# Patient Record
Sex: FEMALE | Race: WHITE | NOT HISPANIC OR LATINO | Employment: OTHER | URBAN - METROPOLITAN AREA
[De-identification: names, ages, dates, MRNs, and addresses within clinical notes are randomized per-mention and may not be internally consistent; named-entity substitution may affect disease eponyms.]

---

## 2017-03-27 ENCOUNTER — OFFICE VISIT (OUTPATIENT)
Dept: FAMILY MEDICINE CLINIC | Facility: CLINIC | Age: 57
End: 2017-03-27

## 2017-03-27 VITALS
SYSTOLIC BLOOD PRESSURE: 130 MMHG | OXYGEN SATURATION: 97 % | DIASTOLIC BLOOD PRESSURE: 70 MMHG | WEIGHT: 293 LBS | BODY MASS INDEX: 45.99 KG/M2 | HEART RATE: 78 BPM | HEIGHT: 67 IN | RESPIRATION RATE: 20 BRPM | TEMPERATURE: 98 F

## 2017-03-27 DIAGNOSIS — I10 ESSENTIAL HYPERTENSION: ICD-10-CM

## 2017-03-27 DIAGNOSIS — E03.9 ACQUIRED HYPOTHYROIDISM: ICD-10-CM

## 2017-03-27 DIAGNOSIS — Z79.4 CONTROLLED TYPE 2 DIABETES MELLITUS WITHOUT COMPLICATION, WITH LONG-TERM CURRENT USE OF INSULIN (HCC): ICD-10-CM

## 2017-03-27 DIAGNOSIS — F32.A DEPRESSION, UNSPECIFIED DEPRESSION TYPE: ICD-10-CM

## 2017-03-27 DIAGNOSIS — M15.3 POST-TRAUMATIC OSTEOARTHRITIS OF MULTIPLE JOINTS: ICD-10-CM

## 2017-03-27 DIAGNOSIS — E11.9 CONTROLLED TYPE 2 DIABETES MELLITUS WITHOUT COMPLICATION, WITH LONG-TERM CURRENT USE OF INSULIN (HCC): ICD-10-CM

## 2017-03-27 DIAGNOSIS — E78.2 MIXED HYPERLIPIDEMIA: ICD-10-CM

## 2017-03-27 PROCEDURE — 99214 OFFICE O/P EST MOD 30 MIN: CPT | Performed by: NURSE PRACTITIONER

## 2017-03-27 RX ORDER — ATORVASTATIN CALCIUM 40 MG/1
40 TABLET, FILM COATED ORAL DAILY
Qty: 30 TABLET | Refills: 5 | Status: SHIPPED | OUTPATIENT
Start: 2017-03-27 | End: 2017-06-19 | Stop reason: SDUPTHER

## 2017-03-27 RX ORDER — INSULIN DETEMIR 100 [IU]/ML
INJECTION, SOLUTION SUBCUTANEOUS
COMMUNITY
Start: 2017-02-18 | End: 2017-03-27 | Stop reason: SDUPTHER

## 2017-03-27 RX ORDER — DULOXETIN HYDROCHLORIDE 60 MG/1
60 CAPSULE, DELAYED RELEASE ORAL DAILY
Qty: 30 CAPSULE | Refills: 5 | Status: SHIPPED | OUTPATIENT
Start: 2017-03-27 | End: 2017-06-19 | Stop reason: SDUPTHER

## 2017-03-27 RX ORDER — REPAGLINIDE 2 MG/1
2 TABLET ORAL
Qty: 90 TABLET | Refills: 5 | Status: SHIPPED | OUTPATIENT
Start: 2017-03-27 | End: 2017-06-19 | Stop reason: SDUPTHER

## 2017-03-27 RX ORDER — INSULIN DETEMIR 100 [IU]/ML
60 INJECTION, SOLUTION SUBCUTANEOUS 2 TIMES DAILY
Qty: 12 PEN | Refills: 5 | Status: SHIPPED | OUTPATIENT
Start: 2017-03-27 | End: 2017-10-16 | Stop reason: SDUPTHER

## 2017-03-27 RX ORDER — POTASSIUM CITRATE 15 MEQ/1
1 TABLET, EXTENDED RELEASE ORAL DAILY
Qty: 30 TABLET | Refills: 5 | Status: SHIPPED | OUTPATIENT
Start: 2017-03-27 | End: 2017-03-28

## 2017-03-27 RX ORDER — NABUMETONE 750 MG/1
750 TABLET, FILM COATED ORAL 2 TIMES DAILY
Qty: 60 TABLET | Refills: 5 | Status: SHIPPED | OUTPATIENT
Start: 2017-03-27 | End: 2017-05-19

## 2017-03-27 RX ORDER — VALSARTAN 320 MG/1
320 TABLET ORAL DAILY
Qty: 30 TABLET | Refills: 5 | Status: SHIPPED | OUTPATIENT
Start: 2017-03-27 | End: 2017-05-19

## 2017-03-27 RX ORDER — LEVOTHYROXINE SODIUM 0.05 MG/1
50 TABLET ORAL DAILY
Qty: 30 TABLET | Refills: 5 | Status: SHIPPED | OUTPATIENT
Start: 2017-03-27 | End: 2017-06-19 | Stop reason: SDUPTHER

## 2017-03-27 NOTE — PROGRESS NOTES
Subjective   Juana Hall is a 56 y.o. female.     History of Present Illness   Chief Complaint:   Chief Complaint   Patient presents with   • Hypertension     MED REFILL   • Hyperlipidemia   • Depression   • Anxiety       Juana Hall 56 y.o. female who presents today for Medical Management of the below listed issues and medication refills.  She was previously seeing Dr. Gan and is in need of new PCP.  she has a history of   Patient Active Problem List   Diagnosis   • Anxiety   • Depression   • Diabetes type 2, controlled   • Hyperlipidemia   • Heart murmur   • Hypertension   • Post-traumatic osteoarthritis of multiple joints   • Psoriasis   • Radiculopathy   • Acquired hypothyroidism   .  Since the last visit, she has overall felt well.  she has been compliant with   Current Outpatient Prescriptions:   •  atorvastatin (LIPITOR) 40 MG tablet, Take 1 tablet by mouth Daily., Disp: 30 tablet, Rfl: 5  •  Dulaglutide 1.5 MG/0.5ML solution pen-injector, Inject 1.5 mg under the skin 1 (One) Time Per Week., Disp: 4 pen, Rfl: 5  •  DULoxetine (CYMBALTA) 60 MG capsule, Take 1 capsule by mouth Daily., Disp: 30 capsule, Rfl: 5  •  glucose blood test strip, 1 each by Other route 3 (Three) Times a Day. Use as instructed, Disp: 100 each, Rfl: 5  •  LEVEMIR FLEXTOUCH 100 UNIT/ML injection, Inject 60 Units under the skin 2 (Two) Times a Day., Disp: 12 pen, Rfl: 5  •  levothyroxine (SYNTHROID, LEVOTHROID) 50 MCG tablet, Take 1 tablet by mouth Daily., Disp: 30 tablet, Rfl: 5  •  nabumetone (RELAFEN) 750 MG tablet, Take 1 tablet by mouth 2 (Two) Times a Day., Disp: 60 tablet, Rfl: 5  •  Potassium Citrate ER 15 MEQ (1620 MG) tablet controlled-release, Take 1 tablet by mouth Daily., Disp: 30 tablet, Rfl: 5  •  repaglinide (PRANDIN) 2 MG tablet, Take 1 tablet by mouth 3 (Three) Times a Day Before Meals., Disp: 90 tablet, Rfl: 5  •  valsartan (DIOVAN) 320 MG tablet, Take 1 tablet by mouth Daily., Disp: 30 tablet, Rfl: 5.  she  "denies medication side effects.    All of the chronic condition(s) listed above are stable w/o issues.  Patient previously saw endo for management of diabetes but wanted PCP to manage as she was stable and specialist copay was expensive.   Patient was also started on oral potassium per urologist to help with kidney stones. Last eye exam was a few months ago and was normal per patient.  She sees podiatry regularly to have her feet checked.     /70  Pulse 78  Temp 98 °F (36.7 °C)  Resp 20  Ht 67\" (170.2 cm)  Wt (!) 392 lb (178 kg)  LMP  (LMP Unknown)  SpO2 97%  BMI 61.4 kg/m2    No results found for this or any previous visit.    The following portions of the patient's history were reviewed and updated as appropriate: allergies, current medications, past family history, past medical history, past social history, past surgical history and problem list.    Review of Systems   Constitutional: Positive for fatigue.   Cardiovascular: Negative for chest pain.   Endocrine: Positive for polyuria. Negative for polydipsia and polyphagia.   Skin: Negative for pallor.   Neurological: Positive for weakness. Negative for dizziness, tremors, seizures, speech difficulty and headaches.   Psychiatric/Behavioral: Negative for confusion. The patient is not nervous/anxious.        Objective   Physical Exam   Constitutional: She is oriented to person, place, and time. She appears well-developed and well-nourished.   Cardiovascular: Normal rate and regular rhythm.    Pulmonary/Chest: Effort normal and breath sounds normal.   Neurological: She is alert and oriented to person, place, and time.   Psychiatric: She has a normal mood and affect. Judgment normal.   Vitals reviewed.      Assessment/Plan   Juana was seen today for hypertension, hyperlipidemia, depression and anxiety.    Diagnoses and all orders for this visit:    Essential hypertension  -     valsartan (DIOVAN) 320 MG tablet; Take 1 tablet by mouth Daily.  -     " Comprehensive metabolic panel  -     Lipid panel  -     CBC and Differential  -     TSH  -     Hemoglobin A1c  -     MicroAlbumin, Urine, Random    Post-traumatic osteoarthritis of multiple joints  -     nabumetone (RELAFEN) 750 MG tablet; Take 1 tablet by mouth 2 (Two) Times a Day.    Acquired hypothyroidism  -     levothyroxine (SYNTHROID, LEVOTHROID) 50 MCG tablet; Take 1 tablet by mouth Daily.  -     Comprehensive metabolic panel  -     Lipid panel  -     CBC and Differential  -     TSH  -     Hemoglobin A1c  -     MicroAlbumin, Urine, Random    Depression, unspecified depression type  -     DULoxetine (CYMBALTA) 60 MG capsule; Take 1 capsule by mouth Daily.    Mixed hyperlipidemia  -     atorvastatin (LIPITOR) 40 MG tablet; Take 1 tablet by mouth Daily.  -     Comprehensive metabolic panel  -     Lipid panel  -     CBC and Differential  -     TSH  -     Hemoglobin A1c  -     MicroAlbumin, Urine, Random    Controlled type 2 diabetes mellitus without complication, with long-term current use of insulin  -     LEVEMIR FLEXTOUCH 100 UNIT/ML injection; Inject 60 Units under the skin 2 (Two) Times a Day.  -     Dulaglutide 1.5 MG/0.5ML solution pen-injector; Inject 1.5 mg under the skin 1 (One) Time Per Week.  -     glucose blood test strip; 1 each by Other route 3 (Three) Times a Day. Use as instructed  -     repaglinide (PRANDIN) 2 MG tablet; Take 1 tablet by mouth 3 (Three) Times a Day Before Meals.  -     Comprehensive metabolic panel  -     Lipid panel  -     CBC and Differential  -     TSH  -     Hemoglobin A1c  -     MicroAlbumin, Urine, Random    Other orders  -     Potassium Citrate ER 15 MEQ (1620 MG) tablet controlled-release; Take 1 tablet by mouth Daily.        Patient understands that she will be referred back to endo if diabetes not well controlled based on lab results.

## 2017-03-28 ENCOUNTER — TELEPHONE (OUTPATIENT)
Dept: FAMILY MEDICINE CLINIC | Facility: CLINIC | Age: 57
End: 2017-03-28

## 2017-03-28 DIAGNOSIS — R79.9 ABNORMAL BLOOD CHEMISTRY: Primary | ICD-10-CM

## 2017-03-28 DIAGNOSIS — N28.9 ABNORMAL KIDNEY FUNCTION: Primary | ICD-10-CM

## 2017-03-28 LAB
ALBUMIN SERPL-MCNC: 3.9 G/DL (ref 3.5–5.2)
ALBUMIN/GLOB SERPL: 1.2 G/DL
ALP SERPL-CCNC: 82 U/L (ref 39–117)
ALT SERPL-CCNC: 21 U/L (ref 1–33)
AST SERPL-CCNC: 18 U/L (ref 1–32)
BASOPHILS # BLD AUTO: 0.03 10*3/MM3 (ref 0–0.2)
BASOPHILS NFR BLD AUTO: 0.4 % (ref 0–1.5)
BILIRUB SERPL-MCNC: 0.4 MG/DL (ref 0.1–1.2)
BUN SERPL-MCNC: 34 MG/DL (ref 6–20)
BUN/CREAT SERPL: 15.9 (ref 7–25)
CALCIUM SERPL-MCNC: 9 MG/DL (ref 8.6–10.5)
CHLORIDE SERPL-SCNC: 106 MMOL/L (ref 98–107)
CHOLEST SERPL-MCNC: 139 MG/DL (ref 0–200)
CO2 SERPL-SCNC: 24.7 MMOL/L (ref 22–29)
CREAT SERPL-MCNC: 2.14 MG/DL (ref 0.57–1)
EOSINOPHIL # BLD AUTO: 0.3 10*3/MM3 (ref 0–0.7)
EOSINOPHIL NFR BLD AUTO: 3.6 % (ref 0.3–6.2)
ERYTHROCYTE [DISTWIDTH] IN BLOOD BY AUTOMATED COUNT: 15.3 % (ref 11.7–13)
GLOBULIN SER CALC-MCNC: 3.3 GM/DL
GLUCOSE SERPL-MCNC: 80 MG/DL (ref 65–99)
HBA1C MFR BLD: 6.4 % (ref 4.8–5.6)
HCT VFR BLD AUTO: 36.5 % (ref 35.6–45.5)
HDLC SERPL-MCNC: 41 MG/DL (ref 40–60)
HGB BLD-MCNC: 11 G/DL (ref 11.9–15.5)
IMM GRANULOCYTES # BLD: 0.03 10*3/MM3 (ref 0–0.03)
IMM GRANULOCYTES NFR BLD: 0.4 % (ref 0–0.5)
LDLC SERPL CALC-MCNC: 77 MG/DL (ref 0–100)
LYMPHOCYTES # BLD AUTO: 1.41 10*3/MM3 (ref 0.9–4.8)
LYMPHOCYTES NFR BLD AUTO: 16.7 % (ref 19.6–45.3)
MCH RBC QN AUTO: 28.5 PG (ref 26.9–32)
MCHC RBC AUTO-ENTMCNC: 30.1 G/DL (ref 32.4–36.3)
MCV RBC AUTO: 94.6 FL (ref 80.5–98.2)
MICROALBUMIN UR-MCNC: 160.3 UG/ML
MONOCYTES # BLD AUTO: 0.58 10*3/MM3 (ref 0.2–1.2)
MONOCYTES NFR BLD AUTO: 6.9 % (ref 5–12)
NEUTROPHILS # BLD AUTO: 6.07 10*3/MM3 (ref 1.9–8.1)
NEUTROPHILS NFR BLD AUTO: 72 % (ref 42.7–76)
PLATELET # BLD AUTO: 269 10*3/MM3 (ref 140–500)
POTASSIUM SERPL-SCNC: 6.4 MMOL/L (ref 3.5–5.2)
PROT SERPL-MCNC: 7.2 G/DL (ref 6–8.5)
RBC # BLD AUTO: 3.86 10*6/MM3 (ref 3.9–5.2)
SODIUM SERPL-SCNC: 144 MMOL/L (ref 136–145)
TRIGL SERPL-MCNC: 106 MG/DL (ref 0–150)
TSH SERPL DL<=0.005 MIU/L-ACNC: 4.39 MIU/ML (ref 0.27–4.2)
VLDLC SERPL CALC-MCNC: 21.2 MG/DL (ref 5–40)
WBC # BLD AUTO: 8.42 10*3/MM3 (ref 4.5–10.7)

## 2017-03-28 NOTE — TELEPHONE ENCOUNTER
Reported from gauri Gupta. They report patient has a potassium of 6.4. Reported to Kiana. N.o received to d/c potassium and repeat bmp in 1 week.

## 2017-04-02 ENCOUNTER — RESULTS ENCOUNTER (OUTPATIENT)
Dept: FAMILY MEDICINE CLINIC | Facility: CLINIC | Age: 57
End: 2017-04-02

## 2017-04-02 DIAGNOSIS — R79.9 ABNORMAL BLOOD CHEMISTRY: ICD-10-CM

## 2017-04-05 LAB
BUN SERPL-MCNC: 29 MG/DL (ref 6–20)
BUN/CREAT SERPL: 14.2 (ref 7–25)
CALCIUM SERPL-MCNC: 9.1 MG/DL (ref 8.6–10.5)
CHLORIDE SERPL-SCNC: 106 MMOL/L (ref 98–107)
CO2 SERPL-SCNC: 25.1 MMOL/L (ref 22–29)
CREAT SERPL-MCNC: 2.04 MG/DL (ref 0.57–1)
GLUCOSE SERPL-MCNC: 229 MG/DL (ref 65–99)
POTASSIUM SERPL-SCNC: 6 MMOL/L (ref 3.5–5.2)
SODIUM SERPL-SCNC: 145 MMOL/L (ref 136–145)

## 2017-04-06 DIAGNOSIS — E87.5 SERUM POTASSIUM ELEVATED: Primary | ICD-10-CM

## 2017-04-11 ENCOUNTER — OFFICE VISIT (OUTPATIENT)
Dept: FAMILY MEDICINE CLINIC | Facility: CLINIC | Age: 57
End: 2017-04-11

## 2017-04-11 ENCOUNTER — RESULTS ENCOUNTER (OUTPATIENT)
Dept: FAMILY MEDICINE CLINIC | Facility: CLINIC | Age: 57
End: 2017-04-11

## 2017-04-11 VITALS
HEIGHT: 67 IN | BODY MASS INDEX: 45.99 KG/M2 | SYSTOLIC BLOOD PRESSURE: 132 MMHG | RESPIRATION RATE: 20 BRPM | WEIGHT: 293 LBS | OXYGEN SATURATION: 97 % | HEART RATE: 86 BPM | DIASTOLIC BLOOD PRESSURE: 78 MMHG | TEMPERATURE: 99 F

## 2017-04-11 DIAGNOSIS — J40 BRONCHITIS: Primary | ICD-10-CM

## 2017-04-11 DIAGNOSIS — E87.5 SERUM POTASSIUM ELEVATED: ICD-10-CM

## 2017-04-11 PROCEDURE — 99213 OFFICE O/P EST LOW 20 MIN: CPT | Performed by: NURSE PRACTITIONER

## 2017-04-11 RX ORDER — PREDNISONE 20 MG/1
20 TABLET ORAL 2 TIMES DAILY
Qty: 10 TABLET | Refills: 0 | Status: SHIPPED | OUTPATIENT
Start: 2017-04-11 | End: 2017-05-19

## 2017-04-11 RX ORDER — CEFUROXIME AXETIL 500 MG/1
500 TABLET ORAL 2 TIMES DAILY
Qty: 14 TABLET | Refills: 0 | Status: SHIPPED | OUTPATIENT
Start: 2017-04-11 | End: 2017-04-18

## 2017-04-11 RX ORDER — BENZONATATE 100 MG/1
200 CAPSULE ORAL 3 TIMES DAILY PRN
Qty: 30 CAPSULE | Refills: 0 | Status: SHIPPED | OUTPATIENT
Start: 2017-04-11 | End: 2017-05-19

## 2017-04-11 NOTE — PROGRESS NOTES
Subjective   Juana Hall is a 56 y.o. female.     History of Present Illness   Juana Hall 56 y.o. female who presents for evaluation of acute bronchitis. Symptoms include dry cough and exertional SOA.  Onset of symptoms was 1 week ago, gradually worsening since that time. Patient denies fever.   Evaluation to date: none Treatment to date:  OTC cough suppressant.     The following portions of the patient's history were reviewed and updated as appropriate: allergies, current medications, past family history, past medical history, past social history, past surgical history and problem list.    Review of Systems   Constitutional: Negative for chills and fever.   HENT: Positive for postnasal drip, rhinorrhea and sore throat. Negative for ear pain.    Respiratory: Positive for cough. Negative for shortness of breath and wheezing.    Cardiovascular: Negative for chest pain.   Musculoskeletal: Negative for myalgias.   Skin: Negative for rash.   Neurological: Negative for headaches.       Objective   Physical Exam   Constitutional: She is oriented to person, place, and time. She appears well-developed and well-nourished.   Cardiovascular: Normal rate and regular rhythm.    Pulmonary/Chest: Effort normal and breath sounds normal.   Neurological: She is alert and oriented to person, place, and time.   Psychiatric: She has a normal mood and affect. Judgment normal.   Vitals reviewed.      Assessment/Plan   Juana was seen today for cough.    Diagnoses and all orders for this visit:    Bronchitis  -     cefuroxime (CEFTIN) 500 MG tablet; Take 1 tablet by mouth 2 (Two) Times a Day for 7 days.  -     predniSONE (DELTASONE) 20 MG tablet; Take 1 tablet by mouth 2 (Two) Times a Day.  -     benzonatate (TESSALON PERLES) 100 MG capsule; Take 2 capsules by mouth 3 (Three) Times a Day As Needed for Cough.

## 2017-04-12 ENCOUNTER — TELEPHONE (OUTPATIENT)
Dept: FAMILY MEDICINE CLINIC | Facility: CLINIC | Age: 57
End: 2017-04-12

## 2017-04-12 LAB
BUN SERPL-MCNC: 41 MG/DL (ref 6–20)
BUN/CREAT SERPL: 24.7 (ref 7–25)
CALCIUM SERPL-MCNC: 9.1 MG/DL (ref 8.6–10.5)
CHLORIDE SERPL-SCNC: 105 MMOL/L (ref 98–107)
CO2 SERPL-SCNC: 20.4 MMOL/L (ref 22–29)
CREAT SERPL-MCNC: 1.66 MG/DL (ref 0.57–1)
GLUCOSE SERPL-MCNC: 102 MG/DL (ref 65–99)
POTASSIUM SERPL-SCNC: 5.6 MMOL/L (ref 3.5–5.2)
SODIUM SERPL-SCNC: 142 MMOL/L (ref 136–145)

## 2017-04-12 NOTE — TELEPHONE ENCOUNTER
----- Message from BETY Washington sent at 4/12/2017 12:07 PM EDT -----  Potassium level and kidney function continuing to improve

## 2017-04-17 ENCOUNTER — TRANSCRIBE ORDERS (OUTPATIENT)
Dept: ADMINISTRATIVE | Facility: HOSPITAL | Age: 57
End: 2017-04-17

## 2017-04-17 DIAGNOSIS — N18.30 CHRONIC KIDNEY DISEASE, STAGE III (MODERATE) (HCC): Primary | ICD-10-CM

## 2017-04-24 ENCOUNTER — APPOINTMENT (OUTPATIENT)
Dept: ULTRASOUND IMAGING | Facility: HOSPITAL | Age: 57
End: 2017-04-24
Attending: INTERNAL MEDICINE

## 2017-05-05 ENCOUNTER — HOSPITAL ENCOUNTER (OUTPATIENT)
Dept: ULTRASOUND IMAGING | Facility: HOSPITAL | Age: 57
Discharge: HOME | End: 2017-05-05
Attending: INTERNAL MEDICINE | Admitting: INTERNAL MEDICINE

## 2017-05-05 DIAGNOSIS — N18.30 CHRONIC KIDNEY DISEASE, STAGE III (MODERATE) (HCC): ICD-10-CM

## 2017-05-05 PROCEDURE — 76775 US EXAM ABDO BACK WALL LIM: CPT

## 2017-05-19 ENCOUNTER — APPOINTMENT (OUTPATIENT)
Dept: PREADMISSION TESTING | Facility: HOSPITAL | Age: 57
End: 2017-05-19

## 2017-05-19 VITALS
DIASTOLIC BLOOD PRESSURE: 52 MMHG | OXYGEN SATURATION: 93 % | WEIGHT: 293 LBS | HEIGHT: 68 IN | RESPIRATION RATE: 18 BRPM | BODY MASS INDEX: 44.41 KG/M2 | TEMPERATURE: 99.1 F | SYSTOLIC BLOOD PRESSURE: 123 MMHG | HEART RATE: 83 BPM

## 2017-05-19 LAB
ANION GAP SERPL CALCULATED.3IONS-SCNC: 14.5 MMOL/L
BASOPHILS # BLD AUTO: 0.03 10*3/MM3 (ref 0–0.2)
BASOPHILS NFR BLD AUTO: 0.3 % (ref 0–1.5)
BUN BLD-MCNC: 27 MG/DL (ref 6–20)
BUN/CREAT SERPL: 13.9 (ref 7–25)
CALCIUM SPEC-SCNC: 8.4 MG/DL (ref 8.6–10.5)
CHLORIDE SERPL-SCNC: 101 MMOL/L (ref 98–107)
CO2 SERPL-SCNC: 25.5 MMOL/L (ref 22–29)
CREAT BLD-MCNC: 1.94 MG/DL (ref 0.57–1)
DEPRECATED RDW RBC AUTO: 45.7 FL (ref 37–54)
EOSINOPHIL # BLD AUTO: 0.28 10*3/MM3 (ref 0–0.7)
EOSINOPHIL NFR BLD AUTO: 2.5 % (ref 0.3–6.2)
ERYTHROCYTE [DISTWIDTH] IN BLOOD BY AUTOMATED COUNT: 14.5 % (ref 11.7–13)
GFR SERPL CREATININE-BSD FRML MDRD: 27 ML/MIN/1.73
GLUCOSE BLD-MCNC: 162 MG/DL (ref 65–99)
HCT VFR BLD AUTO: 34 % (ref 35.6–45.5)
HGB BLD-MCNC: 10.9 G/DL (ref 11.9–15.5)
IMM GRANULOCYTES # BLD: 0.08 10*3/MM3 (ref 0–0.03)
IMM GRANULOCYTES NFR BLD: 0.7 % (ref 0–0.5)
LYMPHOCYTES # BLD AUTO: 2.16 10*3/MM3 (ref 0.9–4.8)
LYMPHOCYTES NFR BLD AUTO: 19 % (ref 19.6–45.3)
MCH RBC QN AUTO: 27.9 PG (ref 26.9–32)
MCHC RBC AUTO-ENTMCNC: 32.1 G/DL (ref 32.4–36.3)
MCV RBC AUTO: 87.2 FL (ref 80.5–98.2)
MONOCYTES # BLD AUTO: 0.7 10*3/MM3 (ref 0.2–1.2)
MONOCYTES NFR BLD AUTO: 6.2 % (ref 5–12)
NEUTROPHILS # BLD AUTO: 8.11 10*3/MM3 (ref 1.9–8.1)
NEUTROPHILS NFR BLD AUTO: 71.3 % (ref 42.7–76)
PLATELET # BLD AUTO: 298 10*3/MM3 (ref 140–500)
PMV BLD AUTO: 9.8 FL (ref 6–12)
POTASSIUM BLD-SCNC: 4.4 MMOL/L (ref 3.5–5.2)
RBC # BLD AUTO: 3.9 10*6/MM3 (ref 3.9–5.2)
SODIUM BLD-SCNC: 141 MMOL/L (ref 136–145)
WBC NRBC COR # BLD: 11.36 10*3/MM3 (ref 4.5–10.7)

## 2017-05-19 PROCEDURE — 93010 ELECTROCARDIOGRAM REPORT: CPT | Performed by: INTERNAL MEDICINE

## 2017-05-19 RX ORDER — AMLODIPINE BESYLATE 10 MG/1
10 TABLET ORAL EVERY MORNING
COMMUNITY
End: 2021-04-01

## 2017-05-22 ENCOUNTER — ANESTHESIA EVENT (OUTPATIENT)
Dept: PERIOP | Facility: HOSPITAL | Age: 57
End: 2017-05-22

## 2017-05-22 ENCOUNTER — HOSPITAL ENCOUNTER (OUTPATIENT)
Facility: HOSPITAL | Age: 57
Setting detail: HOSPITAL OUTPATIENT SURGERY
Discharge: HOME | End: 2017-05-22
Attending: OBSTETRICS & GYNECOLOGY | Admitting: OBSTETRICS & GYNECOLOGY

## 2017-05-22 ENCOUNTER — ANESTHESIA (OUTPATIENT)
Dept: PERIOP | Facility: HOSPITAL | Age: 57
End: 2017-05-22

## 2017-05-22 VITALS
RESPIRATION RATE: 16 BRPM | SYSTOLIC BLOOD PRESSURE: 130 MMHG | TEMPERATURE: 97.6 F | HEART RATE: 65 BPM | OXYGEN SATURATION: 92 % | DIASTOLIC BLOOD PRESSURE: 61 MMHG

## 2017-05-22 DIAGNOSIS — R93.89 THICKENED ENDOMETRIUM: ICD-10-CM

## 2017-05-22 DIAGNOSIS — N95.0 POST-MENOPAUSAL BLEEDING: ICD-10-CM

## 2017-05-22 LAB — GLUCOSE BLDC GLUCOMTR-MCNC: 145 MG/DL (ref 70–130)

## 2017-05-22 PROCEDURE — 25010000002 PROPOFOL 10 MG/ML EMULSION: Performed by: ANESTHESIOLOGY

## 2017-05-22 PROCEDURE — 25010000002 DEXAMETHASONE PER 1 MG: Performed by: ANESTHESIOLOGY

## 2017-05-22 PROCEDURE — 82962 GLUCOSE BLOOD TEST: CPT

## 2017-05-22 PROCEDURE — 25010000002 FENTANYL CITRATE (PF) 100 MCG/2ML SOLUTION: Performed by: ANESTHESIOLOGY

## 2017-05-22 PROCEDURE — 25010000002 ONDANSETRON PER 1 MG: Performed by: ANESTHESIOLOGY

## 2017-05-22 PROCEDURE — 88305 TISSUE EXAM BY PATHOLOGIST: CPT | Performed by: OBSTETRICS & GYNECOLOGY

## 2017-05-22 PROCEDURE — 25010000002 ROPIVACAINE PER 1 MG: Performed by: OBSTETRICS & GYNECOLOGY

## 2017-05-22 PROCEDURE — 25010000002 NEOSTIGMINE 10 MG/10ML SOLUTION: Performed by: ANESTHESIOLOGY

## 2017-05-22 PROCEDURE — 25010000002 MIDAZOLAM PER 1 MG: Performed by: ANESTHESIOLOGY

## 2017-05-22 RX ORDER — SODIUM CHLORIDE, SODIUM LACTATE, POTASSIUM CHLORIDE, CALCIUM CHLORIDE 600; 310; 30; 20 MG/100ML; MG/100ML; MG/100ML; MG/100ML
9 INJECTION, SOLUTION INTRAVENOUS CONTINUOUS
Status: DISCONTINUED | OUTPATIENT
Start: 2017-05-22 | End: 2017-05-22 | Stop reason: HOSPADM

## 2017-05-22 RX ORDER — PROMETHAZINE HYDROCHLORIDE 25 MG/1
25 TABLET ORAL ONCE AS NEEDED
Status: DISCONTINUED | OUTPATIENT
Start: 2017-05-22 | End: 2017-05-22 | Stop reason: HOSPADM

## 2017-05-22 RX ORDER — FENTANYL CITRATE 50 UG/ML
INJECTION, SOLUTION INTRAMUSCULAR; INTRAVENOUS AS NEEDED
Status: DISCONTINUED | OUTPATIENT
Start: 2017-05-22 | End: 2017-05-22 | Stop reason: SURG

## 2017-05-22 RX ORDER — ROPIVACAINE HYDROCHLORIDE 5 MG/ML
INJECTION, SOLUTION EPIDURAL; INFILTRATION; PERINEURAL AS NEEDED
Status: DISCONTINUED | OUTPATIENT
Start: 2017-05-22 | End: 2017-05-22 | Stop reason: HOSPADM

## 2017-05-22 RX ORDER — FAMOTIDINE 10 MG/ML
20 INJECTION, SOLUTION INTRAVENOUS ONCE
Status: COMPLETED | OUTPATIENT
Start: 2017-05-22 | End: 2017-05-22

## 2017-05-22 RX ORDER — CEFAZOLIN SODIUM 2 G/100ML
INJECTION, SOLUTION INTRAVENOUS
Status: DISCONTINUED
Start: 2017-05-22 | End: 2017-05-22 | Stop reason: WASHOUT

## 2017-05-22 RX ORDER — ONDANSETRON 2 MG/ML
4 INJECTION INTRAMUSCULAR; INTRAVENOUS ONCE AS NEEDED
Status: DISCONTINUED | OUTPATIENT
Start: 2017-05-22 | End: 2017-05-22 | Stop reason: HOSPADM

## 2017-05-22 RX ORDER — HYDROCODONE BITARTRATE AND ACETAMINOPHEN 5; 325 MG/1; MG/1
2 TABLET ORAL EVERY 6 HOURS PRN
Status: COMPLETED | OUTPATIENT
Start: 2017-05-22 | End: 2017-05-22

## 2017-05-22 RX ORDER — MORPHINE SULFATE 2 MG/ML
2 INJECTION, SOLUTION INTRAMUSCULAR; INTRAVENOUS
Status: DISCONTINUED | OUTPATIENT
Start: 2017-05-22 | End: 2017-05-22 | Stop reason: HOSPADM

## 2017-05-22 RX ORDER — FENTANYL CITRATE 50 UG/ML
25 INJECTION, SOLUTION INTRAMUSCULAR; INTRAVENOUS
Status: DISCONTINUED | OUTPATIENT
Start: 2017-05-22 | End: 2017-05-22 | Stop reason: HOSPADM

## 2017-05-22 RX ORDER — PROMETHAZINE HYDROCHLORIDE 25 MG/1
12.5 TABLET ORAL ONCE AS NEEDED
Status: DISCONTINUED | OUTPATIENT
Start: 2017-05-22 | End: 2017-05-22 | Stop reason: HOSPADM

## 2017-05-22 RX ORDER — DIPHENHYDRAMINE HYDROCHLORIDE 50 MG/ML
6.25 INJECTION INTRAMUSCULAR; INTRAVENOUS
Status: DISCONTINUED | OUTPATIENT
Start: 2017-05-22 | End: 2017-05-22 | Stop reason: HOSPADM

## 2017-05-22 RX ORDER — PROPOFOL 10 MG/ML
VIAL (ML) INTRAVENOUS AS NEEDED
Status: DISCONTINUED | OUTPATIENT
Start: 2017-05-22 | End: 2017-05-22 | Stop reason: SURG

## 2017-05-22 RX ORDER — MIDAZOLAM HYDROCHLORIDE 1 MG/ML
1 INJECTION INTRAMUSCULAR; INTRAVENOUS
Status: DISCONTINUED | OUTPATIENT
Start: 2017-05-22 | End: 2017-05-22 | Stop reason: HOSPADM

## 2017-05-22 RX ORDER — HYDRALAZINE HYDROCHLORIDE 20 MG/ML
5 INJECTION INTRAMUSCULAR; INTRAVENOUS
Status: DISCONTINUED | OUTPATIENT
Start: 2017-05-22 | End: 2017-05-22 | Stop reason: HOSPADM

## 2017-05-22 RX ORDER — GLYCOPYRROLATE 0.2 MG/ML
INJECTION INTRAMUSCULAR; INTRAVENOUS AS NEEDED
Status: DISCONTINUED | OUTPATIENT
Start: 2017-05-22 | End: 2017-05-22 | Stop reason: SURG

## 2017-05-22 RX ORDER — LABETALOL HYDROCHLORIDE 5 MG/ML
5 INJECTION, SOLUTION INTRAVENOUS
Status: DISCONTINUED | OUTPATIENT
Start: 2017-05-22 | End: 2017-05-22 | Stop reason: HOSPADM

## 2017-05-22 RX ORDER — DEXAMETHASONE SODIUM PHOSPHATE 10 MG/ML
INJECTION INTRAMUSCULAR; INTRAVENOUS AS NEEDED
Status: DISCONTINUED | OUTPATIENT
Start: 2017-05-22 | End: 2017-05-22 | Stop reason: SURG

## 2017-05-22 RX ORDER — NEOSTIGMINE METHYLSULFATE 1 MG/ML
INJECTION, SOLUTION INTRAVENOUS AS NEEDED
Status: DISCONTINUED | OUTPATIENT
Start: 2017-05-22 | End: 2017-05-22 | Stop reason: SURG

## 2017-05-22 RX ORDER — SODIUM CHLORIDE 9 MG/ML
INJECTION, SOLUTION INTRAVENOUS AS NEEDED
Status: DISCONTINUED | OUTPATIENT
Start: 2017-05-22 | End: 2017-05-22 | Stop reason: HOSPADM

## 2017-05-22 RX ORDER — MIDAZOLAM HYDROCHLORIDE 1 MG/ML
2 INJECTION INTRAMUSCULAR; INTRAVENOUS
Status: DISCONTINUED | OUTPATIENT
Start: 2017-05-22 | End: 2017-05-22 | Stop reason: HOSPADM

## 2017-05-22 RX ORDER — SODIUM CHLORIDE 0.9 % (FLUSH) 0.9 %
1-10 SYRINGE (ML) INJECTION AS NEEDED
Status: DISCONTINUED | OUTPATIENT
Start: 2017-05-22 | End: 2017-05-22 | Stop reason: HOSPADM

## 2017-05-22 RX ORDER — MORPHINE SULFATE 2 MG/ML
4 INJECTION, SOLUTION INTRAMUSCULAR; INTRAVENOUS
Status: DISCONTINUED | OUTPATIENT
Start: 2017-05-22 | End: 2017-05-22 | Stop reason: HOSPADM

## 2017-05-22 RX ORDER — HYDROCODONE BITARTRATE AND ACETAMINOPHEN 5; 325 MG/1; MG/1
1-2 TABLET ORAL EVERY 4 HOURS PRN
Qty: 12 TABLET | Refills: 0 | OUTPATIENT
Start: 2017-05-22 | End: 2018-04-27

## 2017-05-22 RX ORDER — PROMETHAZINE HYDROCHLORIDE 25 MG/ML
6.25 INJECTION, SOLUTION INTRAMUSCULAR; INTRAVENOUS ONCE AS NEEDED
Status: DISCONTINUED | OUTPATIENT
Start: 2017-05-22 | End: 2017-05-22 | Stop reason: HOSPADM

## 2017-05-22 RX ORDER — OXYCODONE HYDROCHLORIDE AND ACETAMINOPHEN 5; 325 MG/1; MG/1
1 TABLET ORAL ONCE AS NEEDED
Status: DISCONTINUED | OUTPATIENT
Start: 2017-05-22 | End: 2017-05-22 | Stop reason: HOSPADM

## 2017-05-22 RX ORDER — FLUMAZENIL 0.1 MG/ML
0.2 INJECTION INTRAVENOUS AS NEEDED
Status: DISCONTINUED | OUTPATIENT
Start: 2017-05-22 | End: 2017-05-22 | Stop reason: HOSPADM

## 2017-05-22 RX ORDER — PROMETHAZINE HYDROCHLORIDE 25 MG/ML
12.5 INJECTION, SOLUTION INTRAMUSCULAR; INTRAVENOUS ONCE AS NEEDED
Status: DISCONTINUED | OUTPATIENT
Start: 2017-05-22 | End: 2017-05-22 | Stop reason: HOSPADM

## 2017-05-22 RX ORDER — ONDANSETRON 2 MG/ML
INJECTION INTRAMUSCULAR; INTRAVENOUS AS NEEDED
Status: DISCONTINUED | OUTPATIENT
Start: 2017-05-22 | End: 2017-05-22 | Stop reason: SURG

## 2017-05-22 RX ORDER — ROCURONIUM BROMIDE 10 MG/ML
INJECTION, SOLUTION INTRAVENOUS AS NEEDED
Status: DISCONTINUED | OUTPATIENT
Start: 2017-05-22 | End: 2017-05-22 | Stop reason: SURG

## 2017-05-22 RX ORDER — FENTANYL CITRATE 50 UG/ML
50 INJECTION, SOLUTION INTRAMUSCULAR; INTRAVENOUS
Status: DISCONTINUED | OUTPATIENT
Start: 2017-05-22 | End: 2017-05-22 | Stop reason: HOSPADM

## 2017-05-22 RX ORDER — NALOXONE HCL 0.4 MG/ML
0.4 VIAL (ML) INJECTION AS NEEDED
Status: DISCONTINUED | OUTPATIENT
Start: 2017-05-22 | End: 2017-05-22 | Stop reason: HOSPADM

## 2017-05-22 RX ORDER — PROMETHAZINE HYDROCHLORIDE 25 MG/1
25 SUPPOSITORY RECTAL ONCE AS NEEDED
Status: DISCONTINUED | OUTPATIENT
Start: 2017-05-22 | End: 2017-05-22 | Stop reason: HOSPADM

## 2017-05-22 RX ADMIN — FENTANYL CITRATE 50 MCG: 50 INJECTION INTRAMUSCULAR; INTRAVENOUS at 15:20

## 2017-05-22 RX ADMIN — PROPOFOL 180 MG: 10 INJECTION, EMULSION INTRAVENOUS at 15:10

## 2017-05-22 RX ADMIN — ROCURONIUM BROMIDE 30 MG: 10 INJECTION INTRAVENOUS at 15:10

## 2017-05-22 RX ADMIN — DEXAMETHASONE SODIUM PHOSPHATE 4 MG: 10 INJECTION INTRAMUSCULAR; INTRAVENOUS at 15:11

## 2017-05-22 RX ADMIN — FAMOTIDINE 20 MG: 10 INJECTION, SOLUTION INTRAVENOUS at 13:40

## 2017-05-22 RX ADMIN — NEOSTIGMINE METHYLSULFATE 3 MG: 1 INJECTION INTRAVENOUS at 15:22

## 2017-05-22 RX ADMIN — HYDROCODONE BITARTRATE AND ACETAMINOPHEN 1 TABLET: 5; 325 TABLET ORAL at 16:05

## 2017-05-22 RX ADMIN — MIDAZOLAM 2 MG: 1 INJECTION INTRAMUSCULAR; INTRAVENOUS at 13:40

## 2017-05-22 RX ADMIN — ONDANSETRON 4 MG: 2 INJECTION INTRAMUSCULAR; INTRAVENOUS at 15:22

## 2017-05-22 RX ADMIN — GLYCOPYRROLATE 0.6 MG: 0.2 INJECTION INTRAMUSCULAR; INTRAVENOUS at 15:22

## 2017-05-22 RX ADMIN — SODIUM CHLORIDE, POTASSIUM CHLORIDE, SODIUM LACTATE AND CALCIUM CHLORIDE 9 ML/HR: 600; 310; 30; 20 INJECTION, SOLUTION INTRAVENOUS at 13:39

## 2017-05-22 RX ADMIN — FENTANYL CITRATE 100 MCG: 50 INJECTION INTRAMUSCULAR; INTRAVENOUS at 15:10

## 2017-05-24 LAB
CYTO UR: NORMAL
LAB AP CASE REPORT: NORMAL
Lab: NORMAL
PATH REPORT.FINAL DX SPEC: NORMAL
PATH REPORT.GROSS SPEC: NORMAL

## 2017-06-01 NOTE — OP NOTE
Date of Procedure:  05/22/2017     PREOPERATIVE DIAGNOSES:   1. Postmenopausal bleeding.   2. Thickened endometrial lining.     POSTOPERATIVE DIAGNOSES:  1. Postmenopausal bleeding.   2. Thickened endometrial lining.   3. Atrophic-appearing endometrium.     PROCEDURE: D and C with hysteroscopy    SURGEON: Cherie Oliveros MD     ANESTHESIA: General.     INDICATIONS FOR PROCEDURE: The patient is a 56-year-old postmenopausal patient who had some postmenopausal bleeding. Ultrasound did show a thickened lining that was more than 4 mm and the decision was made to do a dilatation and curettage hysteroscopy.     DESCRIPTION OF PROCEDURE: Under adequate anesthesia the patient was placed in dorsal lithotomy position and prepped and draped in usual sterile fashion. Examination revealed a normal-size uterus. No masses could be palpated. The weighted speculum was placed in the vagina. The anterior lip of the cervix was grasped with a single-tooth tenaculum. The cervix was progressively dilated. The hysteroscope was then inserted and she had a very small cavity with atrophic-appearing endometrium. The sharp curette was then inserted and all areas of the uterine cavity were curetted showing a scant amount of tissue. Hysteroscopy at the end of the procedure again showed no remaining tissue and no lesions were seen. The instruments were removed. Naropin was placed for a paracervical block, 5 mL, at approximately 4 and 8-o’clock positions of the cervix. The patient tolerated the procedure well. Estimated blood loss was minimal.       LOGAN Lopez:ab  D:   05/30/2017 15:32:04  T:   05/30/2017 17:28:06  Job ID:   [id number]  Document ID:   [id number]  cc:

## 2017-06-19 DIAGNOSIS — Z79.4 CONTROLLED TYPE 2 DIABETES MELLITUS WITHOUT COMPLICATION, WITH LONG-TERM CURRENT USE OF INSULIN (HCC): ICD-10-CM

## 2017-06-19 DIAGNOSIS — F32.A DEPRESSION, UNSPECIFIED DEPRESSION TYPE: ICD-10-CM

## 2017-06-19 DIAGNOSIS — E03.9 ACQUIRED HYPOTHYROIDISM: ICD-10-CM

## 2017-06-19 DIAGNOSIS — E11.9 CONTROLLED TYPE 2 DIABETES MELLITUS WITHOUT COMPLICATION, WITH LONG-TERM CURRENT USE OF INSULIN (HCC): ICD-10-CM

## 2017-06-19 DIAGNOSIS — E78.2 MIXED HYPERLIPIDEMIA: ICD-10-CM

## 2017-06-19 RX ORDER — REPAGLINIDE 2 MG/1
2 TABLET ORAL
Qty: 270 TABLET | Refills: 1 | Status: SHIPPED | OUTPATIENT
Start: 2017-06-19 | End: 2017-10-16 | Stop reason: SDUPTHER

## 2017-06-19 RX ORDER — LEVOTHYROXINE SODIUM 0.05 MG/1
50 TABLET ORAL DAILY
Qty: 90 TABLET | Refills: 1 | Status: SHIPPED | OUTPATIENT
Start: 2017-06-19 | End: 2017-10-16 | Stop reason: SDUPTHER

## 2017-06-19 RX ORDER — DULOXETIN HYDROCHLORIDE 60 MG/1
60 CAPSULE, DELAYED RELEASE ORAL DAILY
Qty: 90 CAPSULE | Refills: 1 | Status: SHIPPED | OUTPATIENT
Start: 2017-06-19 | End: 2017-10-16 | Stop reason: SDUPTHER

## 2017-06-19 RX ORDER — ATORVASTATIN CALCIUM 40 MG/1
40 TABLET, FILM COATED ORAL DAILY
Qty: 90 TABLET | Refills: 1 | Status: SHIPPED | OUTPATIENT
Start: 2017-06-19 | End: 2017-10-16 | Stop reason: SDUPTHER

## 2017-10-16 ENCOUNTER — OFFICE VISIT (OUTPATIENT)
Dept: FAMILY MEDICINE CLINIC | Facility: CLINIC | Age: 57
End: 2017-10-16

## 2017-10-16 VITALS
WEIGHT: 293 LBS | SYSTOLIC BLOOD PRESSURE: 136 MMHG | RESPIRATION RATE: 18 BRPM | TEMPERATURE: 98.2 F | OXYGEN SATURATION: 98 % | HEART RATE: 74 BPM | DIASTOLIC BLOOD PRESSURE: 60 MMHG | BODY MASS INDEX: 44.41 KG/M2 | HEIGHT: 68 IN

## 2017-10-16 DIAGNOSIS — Z79.4 CONTROLLED TYPE 2 DIABETES MELLITUS WITHOUT COMPLICATION, WITH LONG-TERM CURRENT USE OF INSULIN (HCC): ICD-10-CM

## 2017-10-16 DIAGNOSIS — E11.9 CONTROLLED TYPE 2 DIABETES MELLITUS WITHOUT COMPLICATION, WITH LONG-TERM CURRENT USE OF INSULIN (HCC): ICD-10-CM

## 2017-10-16 DIAGNOSIS — F32.A DEPRESSION, UNSPECIFIED DEPRESSION TYPE: ICD-10-CM

## 2017-10-16 DIAGNOSIS — E78.2 MIXED HYPERLIPIDEMIA: ICD-10-CM

## 2017-10-16 DIAGNOSIS — R01.1 SYSTOLIC MURMUR: ICD-10-CM

## 2017-10-16 DIAGNOSIS — E03.9 ACQUIRED HYPOTHYROIDISM: ICD-10-CM

## 2017-10-16 DIAGNOSIS — E11.9 TYPE 2 DIABETES MELLITUS WITHOUT COMPLICATION, UNSPECIFIED LONG TERM INSULIN USE STATUS: Primary | ICD-10-CM

## 2017-10-16 PROCEDURE — 99214 OFFICE O/P EST MOD 30 MIN: CPT | Performed by: NURSE PRACTITIONER

## 2017-10-16 PROCEDURE — 90471 IMMUNIZATION ADMIN: CPT | Performed by: NURSE PRACTITIONER

## 2017-10-16 PROCEDURE — 90686 IIV4 VACC NO PRSV 0.5 ML IM: CPT | Performed by: NURSE PRACTITIONER

## 2017-10-16 RX ORDER — INSULIN DETEMIR 100 [IU]/ML
60 INJECTION, SOLUTION SUBCUTANEOUS 2 TIMES DAILY
Qty: 12 PEN | Refills: 5 | Status: SHIPPED | OUTPATIENT
Start: 2017-10-16 | End: 2018-01-20 | Stop reason: SDUPTHER

## 2017-10-16 RX ORDER — DULOXETIN HYDROCHLORIDE 60 MG/1
60 CAPSULE, DELAYED RELEASE ORAL DAILY
Qty: 90 CAPSULE | Refills: 1 | Status: SHIPPED | OUTPATIENT
Start: 2017-10-16 | End: 2018-05-18 | Stop reason: SDUPTHER

## 2017-10-16 RX ORDER — REPAGLINIDE 2 MG/1
2 TABLET ORAL
Qty: 270 TABLET | Refills: 1 | Status: SHIPPED | OUTPATIENT
Start: 2017-10-16 | End: 2018-03-19 | Stop reason: SDUPTHER

## 2017-10-16 RX ORDER — ATORVASTATIN CALCIUM 40 MG/1
40 TABLET, FILM COATED ORAL DAILY
Qty: 90 TABLET | Refills: 1 | Status: SHIPPED | OUTPATIENT
Start: 2017-10-16 | End: 2018-03-19 | Stop reason: SDUPTHER

## 2017-10-16 RX ORDER — LEVOTHYROXINE SODIUM 0.05 MG/1
50 TABLET ORAL DAILY
Qty: 90 TABLET | Refills: 1 | Status: SHIPPED | OUTPATIENT
Start: 2017-10-16 | End: 2018-03-19 | Stop reason: SDUPTHER

## 2017-10-16 NOTE — PROGRESS NOTES
Subjective   Juana Hall is a 57 y.o. female.     History of Present Illness   Chief Complaint:   Chief Complaint   Patient presents with   • Diabetes     med refill   • Hyperlipidemia   • Hypothyroidism       Juana Hall 57 y.o. female who presents today for Medical Management of the below listed issues and medication refills.  she has a problem list of   Patient Active Problem List   Diagnosis   • Anxiety   • Depression   • Diabetes type 2, controlled   • Hyperlipidemia   • Heart murmur   • Hypertension   • Post-traumatic osteoarthritis of multiple joints   • Psoriasis   • Radiculopathy   • Acquired hypothyroidism   .  Since the last visit, she has overall felt well.  she has been compliant with   Current Outpatient Prescriptions:   •  atorvastatin (LIPITOR) 40 MG tablet, Take 1 tablet by mouth Daily., Disp: 90 tablet, Rfl: 1  •  Dulaglutide 1.5 MG/0.5ML solution pen-injector, Inject 1.5 mg under the skin 1 (One) Time Per Week. MONDAYS, Disp: 12 pen, Rfl: 1  •  DULoxetine (CYMBALTA) 60 MG capsule, Take 1 capsule by mouth Daily., Disp: 90 capsule, Rfl: 1  •  glucose blood test strip, 1 each by Other route 3 (Three) Times a Day. Use as instructed, Disp: 100 each, Rfl: 5  •  LEVEMIR FLEXTOUCH 100 UNIT/ML injection, Inject 60 Units under the skin 2 (Two) Times a Day., Disp: 12 pen, Rfl: 5  •  levothyroxine (SYNTHROID, LEVOTHROID) 50 MCG tablet, Take 1 tablet by mouth Daily., Disp: 90 tablet, Rfl: 1  •  repaglinide (PRANDIN) 2 MG tablet, Take 1 tablet by mouth 3 (Three) Times a Day Before Meals., Disp: 270 tablet, Rfl: 1  •  amLODIPine (NORVASC) 10 MG tablet, Take 10 mg by mouth Every Morning., Disp: , Rfl:   •  HYDROcodone-acetaminophen (NORCO) 5-325 MG per tablet, Take 1-2 tablets by mouth Every 4 (Four) Hours As Needed (Pain)., Disp: 12 tablet, Rfl: 0.  she denies medication side effects.    All of the chronic condition(s) listed above are stable w/o issues.    /60  Pulse 74  Temp 98.2 °F (36.8 °C)   "Resp 18  Ht 68\" (172.7 cm)  Wt (!) 398 lb (181 kg)  LMP  (LMP Unknown)  SpO2 98%  BMI 60.52 kg/m2    Results for orders placed or performed during the hospital encounter of 05/22/17   POC Glucose Fingerstick   Result Value Ref Range    Glucose 145 (H) 70 - 130 mg/dL   Tissue Exam   Result Value Ref Range    Case Report       Surgical Pathology Report                         Case: [id number]                                  Authorizing Provider:  Cherie Oliveros MD          Collected:           05/22/2017 03:20 PM          Ordering Location:     [facility]  Received:            05/22/2017 10:10 PM                                 [facility] OR                                                                       Pathologist:           Onofre Ndiaye MD                                                       Specimen:    Uterus, ENDOMETRIAL CURRETTINGS                                                            Final Diagnosis       \"ENDOMETRIAL CURETTINGS\":   PREDOMINANTLY BLOOD WITH FEW TINY FRAGMENTS OF BENIGN INACTIVE ENDOMETRIUM AND     FRAGMENTS OF BENIGN ENDOCERVIX.    THM/darrone     CPT CODES:  1: 40987      Gross Description       1. Received in formalin labeled \"uterus endometrial curettings\" is a 1 x 1 x 0.5 cm aggregate of hemorrhagic tissue fragments.  Specimens are submitted all in block 1A.  CC/USO/CMK/pkm       Microscopic Description       Performed, incorporated in diagnosis.       Embedded Images       Patient has appt with nephrologist Dr. Montes in December. Will get labs prior to appt.    The following portions of the patient's history were reviewed and updated as appropriate: allergies, current medications, past family history, past medical history, past social history, past surgical history and problem list.    Review of Systems    Objective   Physical Exam   Constitutional: She is oriented to person, place, and time. She appears well-developed and well-nourished.   Cardiovascular: " Normal rate and regular rhythm.    Murmur heard.   Systolic murmur is present   Pulmonary/Chest: Effort normal.   Neurological: She is alert and oriented to person, place, and time.   Psychiatric: She has a normal mood and affect. Judgment normal.   Nursing note and vitals reviewed.      Assessment/Plan   Juana was seen today for diabetes, hyperlipidemia and hypothyroidism.    Diagnoses and all orders for this visit:    Type 2 diabetes mellitus without complication, unspecified long term insulin use status  -     Comprehensive metabolic panel  -     Lipid panel  -     CBC and Differential  -     TSH  -     Hemoglobin A1c    Mixed hyperlipidemia  -     atorvastatin (LIPITOR) 40 MG tablet; Take 1 tablet by mouth Daily.    Controlled type 2 diabetes mellitus without complication, with long-term current use of insulin  -     Dulaglutide 1.5 MG/0.5ML solution pen-injector; Inject 1.5 mg under the skin 1 (One) Time Per Week. MONDAYS  -     glucose blood test strip; 1 each by Other route 3 (Three) Times a Day. Use as instructed  -     LEVEMIR FLEXTOUCH 100 UNIT/ML injection; Inject 60 Units under the skin 2 (Two) Times a Day.  -     repaglinide (PRANDIN) 2 MG tablet; Take 1 tablet by mouth 3 (Three) Times a Day Before Meals.    Depression, unspecified depression type  -     DULoxetine (CYMBALTA) 60 MG capsule; Take 1 capsule by mouth Daily.    Acquired hypothyroidism  -     levothyroxine (SYNTHROID, LEVOTHROID) 50 MCG tablet; Take 1 tablet by mouth Daily.    Systolic murmur  -     Adult Transthoracic Echo Complete W/ Cont if Necessary Per Protocol    Other orders  -     Flu Vaccine Quad PF 3YR+ (FLUARIX/FLUZONE [id number])

## 2017-10-23 ENCOUNTER — HOSPITAL ENCOUNTER (OUTPATIENT)
Dept: CARDIOLOGY | Facility: HOSPITAL | Age: 57
Discharge: HOME | End: 2017-10-23
Admitting: NURSE PRACTITIONER

## 2017-10-23 VITALS
HEART RATE: 83 BPM | SYSTOLIC BLOOD PRESSURE: 130 MMHG | HEIGHT: 68 IN | DIASTOLIC BLOOD PRESSURE: 80 MMHG | BODY MASS INDEX: 44.41 KG/M2 | WEIGHT: 293 LBS | OXYGEN SATURATION: 94 %

## 2017-10-23 DIAGNOSIS — R93.1 ABNORMAL ECHOCARDIOGRAM: Primary | ICD-10-CM

## 2017-10-23 LAB
AORTIC DIMENSIONLESS INDEX: 0.6 (DI)
ASCENDING AORTA: 3.2 CM
BH CV ECHO MEAS - ACS: 1.9 CM
BH CV ECHO MEAS - AO MAX PG (FULL): 12.7 MMHG
BH CV ECHO MEAS - AO MAX PG: 18.7 MMHG
BH CV ECHO MEAS - AO MEAN PG (FULL): 6.7 MMHG
BH CV ECHO MEAS - AO MEAN PG: 10.9 MMHG
BH CV ECHO MEAS - AO ROOT AREA (BSA CORRECTED): 1.2
BH CV ECHO MEAS - AO ROOT AREA: 8.1 CM^2
BH CV ECHO MEAS - AO ROOT DIAM: 3.2 CM
BH CV ECHO MEAS - AO V2 MAX: 216.1 CM/SEC
BH CV ECHO MEAS - AO V2 MEAN: 156.9 CM/SEC
BH CV ECHO MEAS - AO V2 VTI: 45.3 CM
BH CV ECHO MEAS - AVA(I,A): 1.9 CM^2
BH CV ECHO MEAS - AVA(I,D): 1.9 CM^2
BH CV ECHO MEAS - AVA(V,A): 1.8 CM^2
BH CV ECHO MEAS - AVA(V,D): 1.8 CM^2
BH CV ECHO MEAS - BSA(HAYCOCK): 3.1 M^2
BH CV ECHO MEAS - BSA: 2.7 M^2
BH CV ECHO MEAS - BZI_BMI: 60.5 KILOGRAMS/M^2
BH CV ECHO MEAS - BZI_METRIC_HEIGHT: 172.7 CM
BH CV ECHO MEAS - BZI_METRIC_WEIGHT: 180.5 KG
BH CV ECHO MEAS - CONTRAST EF (2CH): 52 ML/M^2
BH CV ECHO MEAS - CONTRAST EF 4CH: 46.5 ML/M^2
BH CV ECHO MEAS - EDV(MOD-SP2): 125 ML
BH CV ECHO MEAS - EDV(MOD-SP4): 144 ML
BH CV ECHO MEAS - EDV(TEICH): 118 ML
BH CV ECHO MEAS - EF(CUBED): 52.1 %
BH CV ECHO MEAS - EF(MOD-SP2): 52 %
BH CV ECHO MEAS - EF(MOD-SP4): 49 %
BH CV ECHO MEAS - EF(TEICH): 43.8 %
BH CV ECHO MEAS - ESV(MOD-SP2): 60 ML
BH CV ECHO MEAS - ESV(MOD-SP4): 77 ML
BH CV ECHO MEAS - ESV(TEICH): 66.3 ML
BH CV ECHO MEAS - FS: 21.7 %
BH CV ECHO MEAS - IVS/LVPW: 1
BH CV ECHO MEAS - IVSD: 1.5 CM
BH CV ECHO MEAS - LAT PEAK E' VEL: 12 CM/SEC
BH CV ECHO MEAS - LV DIASTOLIC VOL/BSA (35-75): 52.6 ML/M^2
BH CV ECHO MEAS - LV MASS(C)D: 314.7 GRAMS
BH CV ECHO MEAS - LV MASS(C)DI: 114.9 GRAMS/M^2
BH CV ECHO MEAS - LV MAX PG: 6 MMHG
BH CV ECHO MEAS - LV MEAN PG: 4.2 MMHG
BH CV ECHO MEAS - LV SYSTOLIC VOL/BSA (12-30): 28.1 ML/M^2
BH CV ECHO MEAS - LV V1 MAX: 122.2 CM/SEC
BH CV ECHO MEAS - LV V1 MEAN: 99.4 CM/SEC
BH CV ECHO MEAS - LV V1 VTI: 27.3 CM
BH CV ECHO MEAS - LVIDD: 5 CM
BH CV ECHO MEAS - LVIDS: 3.9 CM
BH CV ECHO MEAS - LVLD AP2: 8.3 CM
BH CV ECHO MEAS - LVLD AP4: 8.4 CM
BH CV ECHO MEAS - LVLS AP2: 7 CM
BH CV ECHO MEAS - LVLS AP4: 7.2 CM
BH CV ECHO MEAS - LVOT AREA (M): 3.1 CM^2
BH CV ECHO MEAS - LVOT AREA: 3.1 CM^2
BH CV ECHO MEAS - LVOT DIAM: 2 CM
BH CV ECHO MEAS - LVPWD: 1.5 CM
BH CV ECHO MEAS - MED PEAK E' VEL: 9 CM/SEC
BH CV ECHO MEAS - MV A DUR: 0.1 SEC
BH CV ECHO MEAS - MV A MAX VEL: 108.7 CM/SEC
BH CV ECHO MEAS - MV DEC SLOPE: 436.1 CM/SEC^2
BH CV ECHO MEAS - MV DEC TIME: 0.2 SEC
BH CV ECHO MEAS - MV E MAX VEL: 87.3 CM/SEC
BH CV ECHO MEAS - MV E/A: 0.8
BH CV ECHO MEAS - MV MAX PG: 5.6 MMHG
BH CV ECHO MEAS - MV MEAN PG: 2.8 MMHG
BH CV ECHO MEAS - MV P1/2T MAX VEL: 100.9 CM/SEC
BH CV ECHO MEAS - MV P1/2T: 67.8 MSEC
BH CV ECHO MEAS - MV V2 MAX: 118.4 CM/SEC
BH CV ECHO MEAS - MV V2 MEAN: 78.1 CM/SEC
BH CV ECHO MEAS - MV V2 VTI: 39.6 CM
BH CV ECHO MEAS - MVA P1/2T LCG: 2.2 CM^2
BH CV ECHO MEAS - MVA(P1/2T): 3.2 CM^2
BH CV ECHO MEAS - MVA(VTI): 2.1 CM^2
BH CV ECHO MEAS - PA ACC TIME: 0.11 SEC
BH CV ECHO MEAS - PA MAX PG (FULL): 4.8 MMHG
BH CV ECHO MEAS - PA MAX PG: 6.8 MMHG
BH CV ECHO MEAS - PA PR(ACCEL): 29.9 MMHG
BH CV ECHO MEAS - PA V2 MAX: 130.3 CM/SEC
BH CV ECHO MEAS - PVA(V,A): 2 CM^2
BH CV ECHO MEAS - PVA(V,D): 2 CM^2
BH CV ECHO MEAS - QP/QS: 0.62
BH CV ECHO MEAS - RAP SYSTOLE: 3 MMHG
BH CV ECHO MEAS - RV MAX PG: 2 MMHG
BH CV ECHO MEAS - RV MEAN PG: 1.1 MMHG
BH CV ECHO MEAS - RV V1 MAX: 71.3 CM/SEC
BH CV ECHO MEAS - RV V1 MEAN: 49.8 CM/SEC
BH CV ECHO MEAS - RV V1 VTI: 14.4 CM
BH CV ECHO MEAS - RVOT AREA: 3.6 CM^2
BH CV ECHO MEAS - RVOT DIAM: 2.1 CM
BH CV ECHO MEAS - SI(AO): 133.7 ML/M^2
BH CV ECHO MEAS - SI(CUBED): 23.7 ML/M^2
BH CV ECHO MEAS - SI(LVOT): 30.9 ML/M^2
BH CV ECHO MEAS - SI(MOD-SP2): 23.7 ML/M^2
BH CV ECHO MEAS - SI(MOD-SP4): 24.5 ML/M^2
BH CV ECHO MEAS - SI(TEICH): 18.9 ML/M^2
BH CV ECHO MEAS - SUP REN AO DIAM: 2 CM
BH CV ECHO MEAS - SV(AO): 366.1 ML
BH CV ECHO MEAS - SV(CUBED): 64.9 ML
BH CV ECHO MEAS - SV(LVOT): 84.5 ML
BH CV ECHO MEAS - SV(MOD-SP2): 65 ML
BH CV ECHO MEAS - SV(MOD-SP4): 67 ML
BH CV ECHO MEAS - SV(RVOT): 52.1 ML
BH CV ECHO MEAS - SV(TEICH): 51.7 ML
BH CV ECHO MEAS - TAPSE (>1.6): 2.5 CM2
BH CV XLRA - RV BASE: 3.2 CM
BH CV XLRA - TDI S': 20 CM/SEC
E/E' RATIO: 8
LEFT ATRIUM VOLUME INDEX: 20 ML/M2
SINUS: 3.1 CM
STJ: 2.7 CM
Z-SCORE OF LEFT VENTRICULAR DIMENSION IN END SYSTOLE: 9

## 2017-10-23 PROCEDURE — 25010000002 PERFLUTREN (DEFINITY) 8.476 MG IN SODIUM CHLORIDE 0.9 % 10 ML INJECTION: Performed by: NURSE PRACTITIONER

## 2017-10-23 PROCEDURE — 93306 TTE W/DOPPLER COMPLETE: CPT

## 2017-10-23 PROCEDURE — 93306 TTE W/DOPPLER COMPLETE: CPT | Performed by: INTERNAL MEDICINE

## 2017-10-23 RX ADMIN — PERFLUTREN 1.5 ML: 6.52 INJECTION, SUSPENSION INTRAVENOUS at 07:48

## 2017-11-18 LAB
ALBUMIN SERPL-MCNC: 3.9 G/DL (ref 3.5–5.5)
ALBUMIN/GLOB SERPL: 1.1 {RATIO} (ref 1.2–2.2)
ALP SERPL-CCNC: 91 IU/L (ref 39–117)
ALT SERPL-CCNC: 13 IU/L (ref 0–32)
AST SERPL-CCNC: 12 IU/L (ref 0–40)
BASOPHILS # BLD AUTO: 0 X10E3/UL (ref 0–0.2)
BASOPHILS NFR BLD AUTO: 0 %
BILIRUB SERPL-MCNC: 0.6 MG/DL (ref 0–1.2)
BUN SERPL-MCNC: 28 MG/DL (ref 6–24)
BUN/CREAT SERPL: 16 (ref 9–23)
CALCIUM SERPL-MCNC: 8.4 MG/DL (ref 8.7–10.2)
CHLORIDE SERPL-SCNC: 100 MMOL/L (ref 96–106)
CHOLEST SERPL-MCNC: 149 MG/DL (ref 100–199)
CO2 SERPL-SCNC: 24 MMOL/L (ref 18–29)
CREAT SERPL-MCNC: 1.75 MG/DL (ref 0.57–1)
EOSINOPHIL # BLD AUTO: 0.4 X10E3/UL (ref 0–0.4)
EOSINOPHIL NFR BLD AUTO: 4 %
ERYTHROCYTE [DISTWIDTH] IN BLOOD BY AUTOMATED COUNT: 15.6 % (ref 12.3–15.4)
GFR SERPLBLD CREATININE-BSD FMLA CKD-EPI: 31 ML/MIN/1.73
GFR SERPLBLD CREATININE-BSD FMLA CKD-EPI: 36 ML/MIN/1.73
GLOBULIN SER CALC-MCNC: 3.4 G/DL (ref 1.5–4.5)
GLUCOSE SERPL-MCNC: 165 MG/DL (ref 65–99)
HBA1C MFR BLD: 6.7 % (ref 4.8–5.6)
HCT VFR BLD AUTO: 36.1 % (ref 34–46.6)
HDLC SERPL-MCNC: 46 MG/DL
HGB BLD-MCNC: 11.7 G/DL (ref 11.1–15.9)
IMM GRANULOCYTES # BLD: 0 X10E3/UL (ref 0–0.1)
IMM GRANULOCYTES NFR BLD: 0 %
LDLC SERPL CALC-MCNC: 78 MG/DL (ref 0–99)
LYMPHOCYTES # BLD AUTO: 1.4 X10E3/UL (ref 0.7–3.1)
LYMPHOCYTES NFR BLD AUTO: 14 %
MCH RBC QN AUTO: 27.3 PG (ref 26.6–33)
MCHC RBC AUTO-ENTMCNC: 32.4 G/DL (ref 31.5–35.7)
MCV RBC AUTO: 84 FL (ref 79–97)
MONOCYTES # BLD AUTO: 0.6 X10E3/UL (ref 0.1–0.9)
MONOCYTES NFR BLD AUTO: 5 %
NEUTROPHILS # BLD AUTO: 7.7 X10E3/UL (ref 1.4–7)
NEUTROPHILS NFR BLD AUTO: 77 %
PLATELET # BLD AUTO: 309 X10E3/UL (ref 150–379)
POTASSIUM SERPL-SCNC: 4.6 MMOL/L (ref 3.5–5.2)
PROT SERPL-MCNC: 7.3 G/DL (ref 6–8.5)
RBC # BLD AUTO: 4.29 X10E6/UL (ref 3.77–5.28)
SODIUM SERPL-SCNC: 141 MMOL/L (ref 134–144)
TRIGL SERPL-MCNC: 126 MG/DL (ref 0–149)
TSH SERPL DL<=0.005 MIU/L-ACNC: 3.32 UIU/ML (ref 0.45–4.5)
VLDLC SERPL CALC-MCNC: 25 MG/DL (ref 5–40)
WBC # BLD AUTO: 10.1 X10E3/UL (ref 3.4–10.8)

## 2017-12-05 ENCOUNTER — OFFICE VISIT (OUTPATIENT)
Dept: CARDIOLOGY | Facility: CLINIC | Age: 57
End: 2017-12-05

## 2017-12-05 VITALS
OXYGEN SATURATION: 98 % | HEART RATE: 78 BPM | WEIGHT: 293 LBS | SYSTOLIC BLOOD PRESSURE: 118 MMHG | DIASTOLIC BLOOD PRESSURE: 78 MMHG | HEIGHT: 55 IN | BODY MASS INDEX: 67.81 KG/M2

## 2017-12-05 DIAGNOSIS — Q23.1 CONGENITAL BICUSPID AORTIC VALVE: Primary | ICD-10-CM

## 2017-12-05 DIAGNOSIS — Z82.49 FAMILY HISTORY OF THORACIC AORTIC ANEURYSM: ICD-10-CM

## 2017-12-05 PROCEDURE — 99204 OFFICE O/P NEW MOD 45 MIN: CPT | Performed by: INTERNAL MEDICINE

## 2017-12-05 RX ORDER — ASPIRIN 81 MG/1
81 TABLET ORAL DAILY
COMMUNITY

## 2017-12-05 RX ORDER — AMOXICILLIN 500 MG/1
2000 CAPSULE ORAL SEE ADMIN INSTRUCTIONS
Qty: 4 CAPSULE | Refills: 6 | Status: SHIPPED | OUTPATIENT
Start: 2017-12-05 | End: 2018-08-28

## 2017-12-10 NOTE — PROGRESS NOTES
Date of Office Visit: 2017  Encounter Provider: Pasha Harkins MD  Place of Service: [facility] CARDIOLOGY  Patient Name: Juana Hall  :1960    Chief complaint: Heart murmur, bicuspid aortic valve    History of Present Illness:    I had the pleasure of seeing the patient in cardiology office on 2017.  She is a   very pleasant 57 year-old white female with a past medical history significant for   prior breast cancer, diabetes, chronic kidney disease, and hypertension who   presents for evaluation.  The patient states that she has had a murmur for quite   some time.  She recently was noted to have a murmur on examination, and   underwent an echocardiogram on 10/23/2017.  This showed a likely bicuspid   aortic valve which was heavily calcified.  She did have mild aortic stenosis with a   peak gradient of 19 mmHg, and a mean gradient of 11 mmHg.  Her ejection fraction   was low normal at 50-55%.  On direct questioning, she has had mild shortness of   breath with exertion for quite some time, although this has not changed significantly.    She has not had any chest pain.  Her father did have a ruptured thoracic aortic   aneurysm.  She also has a significant family history of coronary artery disease with   her mother dying from an MI at 72, and her father dying from an MI at 74.  Of note,   she does have advanced chronic kidney disease with a creatinine of 1.75 and GFR   of 31 on 10/16/2017.    Past Medical History:   Diagnosis Date   • Anxiety and depression    • Bicuspid aortic valve    • Breast cancer     RIGHT, HAD NEELA. MASTECTOMY, CHEMO AND RADIATION   • CKD (chronic kidney disease)    • Diabetes mellitus, type 2    • Foot fracture, right     METATARSAL 3-5, WEARING POST OP SHOE   • Frequent UTI    • Heart murmur    • Hyperlipidemia    • Hypertension    • Hypothyroidism    • Kidney stones    • Lymphedema of leg    • MRSA infection     WITH BREAST  IMPLANT SURGERY   • Osteoarthrosis    • Pap smear, as part of routine gynecological examination     normal   • Post-menopausal bleeding    • Radiculopathy    • Sepsis     FROM KIDNEY STONE   • Thickened endometrium        Past Surgical History:   Procedure Laterality Date   • BREAST RECONSTRUCTION      WITH IMPLANTS, AND REVISION, NOW REMOVED   •  SECTION  1987   • D&C HYSTEROSCOPY N/A 2017    Procedure: DILATATION AND CURETTAGE, HYSTEROSCOPY ;  Surgeon: Cherie Oliveros MD;  Location: [facility] OR [facility] [place];  Service:    • MASTECTOMY Bilateral 2003   • MASTECTOMY Bilateral        Current Outpatient Prescriptions on File Prior to Visit   Medication Sig Dispense Refill   • amLODIPine (NORVASC) 10 MG tablet Take 10 mg by mouth Every Morning.     • atorvastatin (LIPITOR) 40 MG tablet Take 1 tablet by mouth Daily. 90 tablet 1   • Dulaglutide 1.5 MG/0.5ML solution pen-injector Inject 1.5 mg under the skin 1 (One) Time Per Week.  12 pen 1   • DULoxetine (CYMBALTA) 60 MG capsule Take 1 capsule by mouth Daily. 90 capsule 1   • glucose blood test strip 1 each by Other route 3 (Three) Times a Day. Use as instructed 100 each 5   • HYDROcodone-acetaminophen (NORCO) 5-325 MG per tablet Take 1-2 tablets by mouth Every 4 (Four) Hours As Needed (Pain). 12 tablet 0   • LEVEMIR FLEXTOUCH 100 UNIT/ML injection Inject 60 Units under the skin 2 (Two) Times a Day. 12 pen 5   • levothyroxine (SYNTHROID, LEVOTHROID) 50 MCG tablet Take 1 tablet by mouth Daily. 90 tablet 1   • repaglinide (PRANDIN) 2 MG tablet Take 1 tablet by mouth 3 (Three) Times a Day Before Meals. 270 tablet 1     No current facility-administered medications on file prior to visit.      Allergies as of 2017   • (No Known Allergies)     Social History     Social History   • Marital status:      Spouse name: N/A   • Number of children: N/A   • Years of education: N/A     Occupational History   • Not on file.     Social  "History Main Topics   • Smoking status: Never Smoker   • Smokeless tobacco: Never Used   • Alcohol use No   • Drug use: No   • Sexual activity: Defer     Other Topics Concern   • Not on file     Social History Narrative     Family History   Problem Relation Age of Onset   • Coronary artery disease Mother      Mother  from MI at 72   • Diabetes Mother    • Breast cancer Mother    • Aortic aneurysm Father      Father with ruptured thoracic aortic aneurysm   • Coronary artery disease Father      Father  from MI at 74   • Coronary artery disease Maternal Grandmother      MGM deceeased from MI at age 76       Review of Systems   Cardiovascular: Positive for dyspnea on exertion.   Respiratory: Positive for cough.    Musculoskeletal: Positive for joint pain.   Genitourinary: Positive for frequency.   Neurological: Positive for excessive daytime sleepiness and numbness.   All other systems reviewed and are negative.    Objective:     Vitals:    17 1153   BP: 118/78   Pulse: 78   SpO2: 98%   Weight: (!) 180 kg (397 lb)   Height: 68 cm (26.77\")     Body mass index is 389.43 kg/(m^2).    Physical Exam   Constitutional: She is oriented to person, place, and time. She appears well-developed.   Obese   HENT:   Head: Normocephalic and atraumatic.   Eyes: Conjunctivae are normal.   Neck: Neck supple.   Cardiovascular: Normal rate and regular rhythm.  Exam reveals no gallop and no friction rub.    Murmur heard.   Systolic murmur is present with a grade of 3/6  at the upper right sternal border, upper left sternal border  Pulmonary/Chest: Effort normal and breath sounds normal.   Abdominal: Soft. There is no tenderness.   Musculoskeletal: She exhibits no edema.   Neurological: She is alert and oriented to person, place, and time.   Skin: Skin is warm.   Psychiatric: She has a normal mood and affect. Her behavior is normal.     Lab Review:   Procedures    Cardiac Procedures:  1.  Echocardiogram on 10/23/2017: The " ejection fraction was low normal at 50-55%.    There was mild LVH.  There was grade 1 diastolic dysfunction.  The left atrium was   mildly dilated.  A bicuspid aortic valve could not be excluded.  The aortic valve leaflets   were heavily calcified.  There was mild aortic stenosis with a peak gradient of 19   mmHg, and a mean gradient of 11 mmHg.    Assessment:       Diagnosis Plan   1. Congenital bicuspid aortic valve  CT chest wo contrast   2. Family history of thoracic aortic aneurysm  CT chest wo contrast     Plan:       After reviewing the patient's echocardiogram, I strongly suspect that this is a bicuspid   aortic valve.  Based on her young age of 57, this would be the most likely diagnosis.    She does have mild aortic stenosis, although no significant aortic insufficiency.    Interestingly, her father had a ruptured thoracic aortic aneurysm, and aneurysms are   associated with bicuspid valves.  I am going to check a noncontrast CT scan of her   chest to evaluate her aorta in better detail and to assess for any potential aneurysm.    I will not use contrast for this given her pain is chronic kidney disease with a recent   GFR of 31 and creatinine of 1.75.  I would treat her as if she has a bicuspid aortic   valve until proven otherwise.  I have recommended antibiotics prior to any dental   appointments given that this is a congenital abnormality.  I did prescribe amoxicillin   as needed for this.  She will need serial echocardiography in the future, although   she is asymptomatic with regards to this for now.  Further plans will be made   pending the results of the CT scan of the chest.  Otherwise, I will see her back in 6   months unless other issues arise.

## 2017-12-13 ENCOUNTER — HOSPITAL ENCOUNTER (OUTPATIENT)
Dept: CT IMAGING | Facility: HOSPITAL | Age: 57
Discharge: HOME | End: 2017-12-13
Attending: INTERNAL MEDICINE | Admitting: INTERNAL MEDICINE

## 2017-12-13 DIAGNOSIS — Q23.1 CONGENITAL BICUSPID AORTIC VALVE: ICD-10-CM

## 2017-12-13 DIAGNOSIS — Z82.49 FAMILY HISTORY OF THORACIC AORTIC ANEURYSM: ICD-10-CM

## 2017-12-13 PROCEDURE — 71250 CT THORAX DX C-: CPT

## 2018-01-20 DIAGNOSIS — E11.9 CONTROLLED TYPE 2 DIABETES MELLITUS WITHOUT COMPLICATION, WITH LONG-TERM CURRENT USE OF INSULIN (HCC): ICD-10-CM

## 2018-01-20 DIAGNOSIS — Z79.4 CONTROLLED TYPE 2 DIABETES MELLITUS WITHOUT COMPLICATION, WITH LONG-TERM CURRENT USE OF INSULIN (HCC): ICD-10-CM

## 2018-01-22 RX ORDER — INSULIN DETEMIR 100 [IU]/ML
INJECTION, SOLUTION SUBCUTANEOUS
Qty: 9 ML | Refills: 0 | Status: SHIPPED | OUTPATIENT
Start: 2018-01-22 | End: 2018-02-11 | Stop reason: SDUPTHER

## 2018-02-11 DIAGNOSIS — Z79.4 CONTROLLED TYPE 2 DIABETES MELLITUS WITHOUT COMPLICATION, WITH LONG-TERM CURRENT USE OF INSULIN (HCC): ICD-10-CM

## 2018-02-11 DIAGNOSIS — E11.9 CONTROLLED TYPE 2 DIABETES MELLITUS WITHOUT COMPLICATION, WITH LONG-TERM CURRENT USE OF INSULIN (HCC): ICD-10-CM

## 2018-02-12 ENCOUNTER — TELEPHONE (OUTPATIENT)
Dept: FAMILY MEDICINE CLINIC | Facility: CLINIC | Age: 58
End: 2018-02-12

## 2018-02-12 DIAGNOSIS — Z79.4 CONTROLLED TYPE 2 DIABETES MELLITUS WITHOUT COMPLICATION, WITH LONG-TERM CURRENT USE OF INSULIN (HCC): ICD-10-CM

## 2018-02-12 DIAGNOSIS — E11.9 CONTROLLED TYPE 2 DIABETES MELLITUS WITHOUT COMPLICATION, WITH LONG-TERM CURRENT USE OF INSULIN (HCC): ICD-10-CM

## 2018-02-12 RX ORDER — INSULIN DETEMIR 100 [IU]/ML
60 INJECTION, SOLUTION SUBCUTANEOUS 2 TIMES DAILY
Qty: 10 PEN | Refills: 0 | Status: SHIPPED | OUTPATIENT
Start: 2018-02-12 | End: 2018-03-19 | Stop reason: SDUPTHER

## 2018-02-12 RX ORDER — INSULIN DETEMIR 100 [IU]/ML
INJECTION, SOLUTION SUBCUTANEOUS
Qty: 9 PEN | Refills: 0 | Status: SHIPPED | OUTPATIENT
Start: 2018-02-12 | End: 2018-02-12 | Stop reason: SDUPTHER

## 2018-02-21 ENCOUNTER — TELEPHONE (OUTPATIENT)
Dept: CARDIOLOGY | Facility: CLINIC | Age: 58
End: 2018-02-21

## 2018-02-21 RX ORDER — CARVEDILOL 3.12 MG/1
3.12 TABLET ORAL 2 TIMES DAILY
Qty: 60 TABLET | Refills: 11 | Status: SHIPPED | OUTPATIENT
Start: 2018-02-21 | End: 2019-02-10 | Stop reason: SDUPTHER

## 2018-03-07 ENCOUNTER — CLINICAL SUPPORT (OUTPATIENT)
Dept: CARDIOLOGY | Facility: CLINIC | Age: 58
End: 2018-03-07

## 2018-03-07 NOTE — PROGRESS NOTES
Pt came in today with her home blood pressure monitor to get her blood pressure checked. She was started on coreg 3.125mg bid on 2/21/18. Medications were reconciled.  Her vitals are as follows:    Manual Cuff  BP  142/80 R  HR  62    Home Wrist BP Cuff  BP  131/78 R  HR  75    Per Dr. Harkins, he will review and call the patient.

## 2018-03-16 DIAGNOSIS — E11.9 CONTROLLED TYPE 2 DIABETES MELLITUS WITHOUT COMPLICATION, WITH LONG-TERM CURRENT USE OF INSULIN (HCC): ICD-10-CM

## 2018-03-16 DIAGNOSIS — Z79.4 CONTROLLED TYPE 2 DIABETES MELLITUS WITHOUT COMPLICATION, WITH LONG-TERM CURRENT USE OF INSULIN (HCC): ICD-10-CM

## 2018-03-16 RX ORDER — DULAGLUTIDE 1.5 MG/.5ML
INJECTION, SOLUTION SUBCUTANEOUS
Refills: 0 | Status: CANCELLED | OUTPATIENT
Start: 2018-03-16

## 2018-03-16 RX ORDER — INSULIN DETEMIR 100 [IU]/ML
INJECTION, SOLUTION SUBCUTANEOUS
Refills: 0 | Status: CANCELLED | OUTPATIENT
Start: 2018-03-16

## 2018-03-19 ENCOUNTER — OFFICE VISIT (OUTPATIENT)
Dept: FAMILY MEDICINE CLINIC | Facility: CLINIC | Age: 58
End: 2018-03-19

## 2018-03-19 VITALS
OXYGEN SATURATION: 96 % | HEART RATE: 77 BPM | BODY MASS INDEX: 44.41 KG/M2 | TEMPERATURE: 97.8 F | RESPIRATION RATE: 18 BRPM | DIASTOLIC BLOOD PRESSURE: 70 MMHG | HEIGHT: 68 IN | WEIGHT: 293 LBS | SYSTOLIC BLOOD PRESSURE: 132 MMHG

## 2018-03-19 DIAGNOSIS — E78.2 MIXED HYPERLIPIDEMIA: ICD-10-CM

## 2018-03-19 DIAGNOSIS — E11.9 CONTROLLED TYPE 2 DIABETES MELLITUS WITHOUT COMPLICATION, WITH LONG-TERM CURRENT USE OF INSULIN (HCC): ICD-10-CM

## 2018-03-19 DIAGNOSIS — E03.9 ACQUIRED HYPOTHYROIDISM: ICD-10-CM

## 2018-03-19 DIAGNOSIS — M15.3 POST-TRAUMATIC OSTEOARTHRITIS OF MULTIPLE JOINTS: Primary | ICD-10-CM

## 2018-03-19 DIAGNOSIS — Z79.4 CONTROLLED TYPE 2 DIABETES MELLITUS WITHOUT COMPLICATION, WITH LONG-TERM CURRENT USE OF INSULIN (HCC): ICD-10-CM

## 2018-03-19 PROCEDURE — 99214 OFFICE O/P EST MOD 30 MIN: CPT | Performed by: NURSE PRACTITIONER

## 2018-03-19 RX ORDER — ATORVASTATIN CALCIUM 40 MG/1
40 TABLET, FILM COATED ORAL DAILY
Qty: 90 TABLET | Refills: 1 | Status: SHIPPED | OUTPATIENT
Start: 2018-03-19 | End: 2018-08-28 | Stop reason: SDUPTHER

## 2018-03-19 RX ORDER — LEVOTHYROXINE SODIUM 0.05 MG/1
50 TABLET ORAL DAILY
Qty: 90 TABLET | Refills: 1 | Status: SHIPPED | OUTPATIENT
Start: 2018-03-19 | End: 2018-08-28 | Stop reason: SDUPTHER

## 2018-03-19 RX ORDER — INSULIN DETEMIR 100 [IU]/ML
60 INJECTION, SOLUTION SUBCUTANEOUS 2 TIMES DAILY
Qty: 10 PEN | Refills: 1 | Status: SHIPPED | OUTPATIENT
Start: 2018-03-19 | End: 2018-05-09 | Stop reason: SDUPTHER

## 2018-03-19 RX ORDER — REPAGLINIDE 2 MG/1
2 TABLET ORAL
Qty: 270 TABLET | Refills: 1 | Status: SHIPPED | OUTPATIENT
Start: 2018-03-19 | End: 2018-08-28 | Stop reason: SDUPTHER

## 2018-03-19 NOTE — PROGRESS NOTES
Subjective   Juana Hall is a 57 y.o. female.     History of Present Illness   Juana Hall 57 y.o. female who presents today for routine follow up check and medication refills.  she has a history of   Patient Active Problem List   Diagnosis   • Anxiety   • Depression   • Diabetes type 2, controlled   • Hyperlipidemia   • Heart murmur   • Hypertension   • Post-traumatic osteoarthritis of multiple joints   • Psoriasis   • Radiculopathy   • Acquired hypothyroidism   .  Since the last visit, she has overall felt well.  She has hypertension, hyperlipidemia, DM2 and anxiety which is well controlled on medications. She is due for labs.   she has been compliant with current medications have reviewed them.  The patient denies medication side effects.    Results for orders placed or performed during the hospital encounter of 01/09/18   POCT Influenza A/B   Result Value Ref Range    Rapid Influenza A Ag NEGATIVE     Rapid Influenza B Ag POSTIVE     Internal Control Passed Passed    Lot Number [id number]     Expiration Date 4,015,619      She has seen cardiology and is taking amlodipine and carvedilol for HTN and management of bicuspid aortic valve.  Has f/u appt in June. Sees nephrologist in June also.   The following portions of the patient's history were reviewed and updated as appropriate: allergies, current medications, past family history, past medical history, past social history, past surgical history and problem list.    Review of Systems   Constitutional: Negative for unexpected weight change.   Respiratory: Negative for shortness of breath.    Cardiovascular: Positive for leg swelling. Negative for chest pain and palpitations.   Psychiatric/Behavioral: Negative for behavioral problems.       Objective   Physical Exam   Constitutional: She is oriented to person, place, and time. She appears well-developed and well-nourished.   Cardiovascular: Normal rate and regular rhythm.    Murmur heard.   Systolic murmur is  present   Pulmonary/Chest: Effort normal and breath sounds normal.   Neurological: She is alert and oriented to person, place, and time.   Psychiatric: She has a normal mood and affect. Judgment normal.   Vitals reviewed.      Assessment/Plan   Juana was seen today for hyperlipidemia, diabetes, hypothyroidism, anxiety and depression.    Diagnoses and all orders for this visit:    Post-traumatic osteoarthritis of multiple joints  -     Ambulatory Referral to Pain Management    Mixed hyperlipidemia  -     atorvastatin (LIPITOR) 40 MG tablet; Take 1 tablet by mouth Daily.  -     Comprehensive metabolic panel  -     Lipid panel  -     CBC and Differential  -     TSH  -     Hemoglobin A1c  -     MicroAlbumin, Urine, Random - Urine, Clean Catch    Controlled type 2 diabetes mellitus without complication, with long-term current use of insulin  -     Dulaglutide 1.5 MG/0.5ML solution pen-injector; Inject 1.5 mg under the skin 1 (One) Time Per Week. MONDAYS  -     glucose blood test strip; 1 each by Other route 3 (Three) Times a Day. Use as instructed  -     LEVEMIR FLEXTOUCH 100 UNIT/ML injection; Inject 60 Units under the skin 2 (Two) Times a Day.  -     repaglinide (PRANDIN) 2 MG tablet; Take 1 tablet by mouth 3 (Three) Times a Day Before Meals.  -     Comprehensive metabolic panel  -     Lipid panel  -     CBC and Differential  -     TSH  -     Hemoglobin A1c  -     MicroAlbumin, Urine, Random - Urine, Clean Catch    Acquired hypothyroidism  -     levothyroxine (SYNTHROID, LEVOTHROID) 50 MCG tablet; Take 1 tablet by mouth Daily.  -     Comprehensive metabolic panel  -     Lipid panel  -     CBC and Differential  -     TSH  -     Hemoglobin A1c  -     MicroAlbumin, Urine, Random - Urine, Clean Catch          Patient would like to see pain management for chronic lower extremity pain from osteoarthritis and lymphedema. She has seen lymphedema clinic which she states did not help.  She does not want referral back as it was  expensive.

## 2018-03-20 LAB
ALBUMIN SERPL-MCNC: 4.3 G/DL (ref 3.5–5.2)
ALBUMIN/GLOB SERPL: 1.4 G/DL
ALP SERPL-CCNC: 88 U/L (ref 39–117)
ALT SERPL-CCNC: 18 U/L (ref 1–33)
AST SERPL-CCNC: 14 U/L (ref 1–32)
BASOPHILS # BLD AUTO: 0.04 10*3/MM3 (ref 0–0.2)
BASOPHILS NFR BLD AUTO: 0.4 % (ref 0–1.5)
BILIRUB SERPL-MCNC: 0.6 MG/DL (ref 0.1–1.2)
BUN SERPL-MCNC: 33 MG/DL (ref 6–20)
BUN/CREAT SERPL: 21 (ref 7–25)
CALCIUM SERPL-MCNC: 9.7 MG/DL (ref 8.6–10.5)
CHLORIDE SERPL-SCNC: 102 MMOL/L (ref 98–107)
CHOLEST SERPL-MCNC: 149 MG/DL (ref 0–200)
CO2 SERPL-SCNC: 29.2 MMOL/L (ref 22–29)
CREAT SERPL-MCNC: 1.57 MG/DL (ref 0.57–1)
EOSINOPHIL # BLD AUTO: 0.36 10*3/MM3 (ref 0–0.7)
EOSINOPHIL NFR BLD AUTO: 4 % (ref 0.3–6.2)
ERYTHROCYTE [DISTWIDTH] IN BLOOD BY AUTOMATED COUNT: 15.4 % (ref 11.7–13)
GFR SERPLBLD CREATININE-BSD FMLA CKD-EPI: 34 ML/MIN/1.73
GFR SERPLBLD CREATININE-BSD FMLA CKD-EPI: 41 ML/MIN/1.73
GLOBULIN SER CALC-MCNC: 3.1 GM/DL
GLUCOSE SERPL-MCNC: 139 MG/DL (ref 65–99)
HBA1C MFR BLD: 6.1 % (ref 4.8–5.6)
HCT VFR BLD AUTO: 38.9 % (ref 35.6–45.5)
HDLC SERPL-MCNC: 46 MG/DL (ref 40–60)
HGB BLD-MCNC: 11.5 G/DL (ref 11.9–15.5)
IMM GRANULOCYTES # BLD: 0.02 10*3/MM3 (ref 0–0.03)
IMM GRANULOCYTES NFR BLD: 0.2 % (ref 0–0.5)
LDLC SERPL CALC-MCNC: 83 MG/DL (ref 0–100)
LYMPHOCYTES # BLD AUTO: 1.59 10*3/MM3 (ref 0.9–4.8)
LYMPHOCYTES NFR BLD AUTO: 17.6 % (ref 19.6–45.3)
MCH RBC QN AUTO: 27.5 PG (ref 26.9–32)
MCHC RBC AUTO-ENTMCNC: 29.6 G/DL (ref 32.4–36.3)
MCV RBC AUTO: 93.1 FL (ref 80.5–98.2)
MICROALBUMIN UR-MCNC: 217.3 UG/ML
MONOCYTES # BLD AUTO: 0.52 10*3/MM3 (ref 0.2–1.2)
MONOCYTES NFR BLD AUTO: 5.8 % (ref 5–12)
NEUTROPHILS # BLD AUTO: 6.49 10*3/MM3 (ref 1.9–8.1)
NEUTROPHILS NFR BLD AUTO: 72 % (ref 42.7–76)
PLATELET # BLD AUTO: 274 10*3/MM3 (ref 140–500)
POTASSIUM SERPL-SCNC: 5.6 MMOL/L (ref 3.5–5.2)
PROT SERPL-MCNC: 7.4 G/DL (ref 6–8.5)
RBC # BLD AUTO: 4.18 10*6/MM3 (ref 3.9–5.2)
SODIUM SERPL-SCNC: 144 MMOL/L (ref 136–145)
TRIGL SERPL-MCNC: 100 MG/DL (ref 0–150)
TSH SERPL DL<=0.005 MIU/L-ACNC: 3.2 MIU/ML (ref 0.27–4.2)
VLDLC SERPL CALC-MCNC: 20 MG/DL (ref 5–40)
WBC # BLD AUTO: 9.02 10*3/MM3 (ref 4.5–10.7)

## 2018-04-05 ENCOUNTER — TELEPHONE (OUTPATIENT)
Dept: FAMILY MEDICINE CLINIC | Facility: CLINIC | Age: 58
End: 2018-04-05

## 2018-04-05 DIAGNOSIS — M15.3 POST-TRAUMATIC OSTEOARTHRITIS OF MULTIPLE JOINTS: Primary | ICD-10-CM

## 2018-04-06 ENCOUNTER — PATIENT MESSAGE (OUTPATIENT)
Dept: CARDIOLOGY | Facility: CLINIC | Age: 58
End: 2018-04-06

## 2018-04-06 NOTE — TELEPHONE ENCOUNTER
From: Juana Hall  To: Pasha Harkins MD  Sent: 4/6/2018 9:07 AM EDT  Subject: Visit Follow-Up Question    Dr harkins asked me to send my bp readings for this week     3/30 120/78  3/31. 119/76  4/1 120/79  4/2. 122/80  4/3. 146/90  4/5. 125/81  4/6. 131/79    Have a great day

## 2018-04-16 ENCOUNTER — TRANSCRIBE ORDERS (OUTPATIENT)
Dept: PHYSICAL THERAPY | Facility: HOSPITAL | Age: 58
End: 2018-04-16

## 2018-04-16 DIAGNOSIS — M79.605 PAIN IN BOTH LOWER EXTREMITIES: Primary | ICD-10-CM

## 2018-04-16 DIAGNOSIS — M79.604 PAIN IN BOTH LOWER EXTREMITIES: Primary | ICD-10-CM

## 2018-04-26 ENCOUNTER — HOSPITAL ENCOUNTER (OUTPATIENT)
Dept: PHYSICAL THERAPY | Facility: HOSPITAL | Age: 58
Setting detail: THERAPIES SERIES
Discharge: HOME | End: 2018-04-26

## 2018-04-26 DIAGNOSIS — M79.604 RIGHT LEG PAIN: Primary | ICD-10-CM

## 2018-04-26 DIAGNOSIS — R26.2 DIFFICULTY WALKING: ICD-10-CM

## 2018-04-26 DIAGNOSIS — M79.605 LEFT LEG PAIN: ICD-10-CM

## 2018-04-26 PROCEDURE — 97162 PT EVAL MOD COMPLEX 30 MIN: CPT | Performed by: PHYSICAL THERAPIST

## 2018-04-26 NOTE — THERAPY EVALUATION
Outpatient Physical Therapy Ortho Initial Evaluation  [facility]     Patient Name: Juana Hall  : 1960  MRN: [id number]  Today's Date: 2018      Visit Date: 2018    Patient Active Problem List   Diagnosis   • Anxiety   • Depression   • Diabetes type 2, controlled   • Hyperlipidemia   • Heart murmur   • Hypertension   • Post-traumatic osteoarthritis of multiple joints   • Psoriasis   • Radiculopathy   • Acquired hypothyroidism        Past Medical History:   Diagnosis Date   • Anemia    • Anxiety    • Anxiety and depression    • Arthritis    • Bicuspid aortic valve    • Breast cancer     RIGHT, HAD NEELA. MASTECTOMY, CHEMO AND RADIATION   • Cancer    • CKD (chronic kidney disease)    • Depression    • Diabetes mellitus    • Diabetes mellitus, type 2    • Foot fracture, right     METATARSAL 3-5, WEARING POST OP SHOE   • Frequent UTI    • Heart murmur    • Hyperlipidemia    • Hypertension    • Hyperthyroidism    • Hypothyroidism    • Kidney stone    • Kidney stones    • Lymphedema of leg    • MRSA infection 2005    WITH BREAST IMPLANT SURGERY   • Neuromuscular disorder    • Obesity    • Osteoarthrosis    • Pap smear, as part of routine gynecological examination 2013    normal   • Post-menopausal bleeding    • Radiculopathy    • Sepsis     FROM KIDNEY STONE   • Thickened endometrium    • Tremor         Past Surgical History:   Procedure Laterality Date   • BREAST RECONSTRUCTION      WITH IMPLANTS, AND REVISION, NOW REMOVED   • BREAST SURGERY     •  SECTION  1987   •  SECTION  1987   • D&C HYSTEROSCOPY N/A 2017    Procedure: DILATATION AND CURETTAGE, HYSTEROSCOPY ;  Surgeon: Cherie Oliveros MD;  Location: [facility] [place] [facility] [place];  Service:    • LYMPH NODE BIOPSY     • MASTECTOMY Bilateral 2003   • MASTECTOMY Bilateral        Visit Dx:     ICD-10-CM ICD-9-CM   1. Right leg pain M79.604 729.5   2. Left leg pain M79.605 729.5   3. Difficulty walking  R26.2 719.7             Patient History     Row Name 04/26/18 1135             History    Chief Complaint Difficulty Walking;Pain  -JS      Type of Pain Lower Extremity / Leg  -JS      Date Current Problem(s) Began 04/26/17  -JS      Brief Description of Current Complaint Pt reports lymphedema L LE occurred after complications of sepsis 8 years ago.  Reports as a result of L LE lymphedema, pt has adopted compensatory way of walking resulting in R LE pain as well.  Pt feels that symptoms have worsened in past year.  Pt has tried lymphedema treatments including wrapping L LE in past, though has not found benefit.  Pt started with pain management clinic last week & pain management physician recommended increasing activity, therefore now referred to PT for aquatic therapy, though pt concerned about doing exercise in this environment due to decreased mobility.  Chief complaints are bilateral lower leg pain & difficulty walking.  -JS      Previous treatment for THIS PROBLEM Pain Management;Rehabilitation;Medication   Started pain management last week  -JS      Patient/Caregiver Goals Relieve pain;Improve mobility  -JS      Hand Dominance right-handed  -JS      Occupation/sports/leisure activities Retired. Playing cards, reading, crocheting  -JS         Pain     Pain Location Leg  -JS      Pain at Present 4  -JS      Pain at Best 2  -JS      Pain at Worst 4  -JS      What Performance Factors Make the Current Problem(s) WORSE? Prolonged sitting & standing  -JS      What Performance Factors Make the Current Problem(s) BETTER? Massage  -JS      Tolerance Time- Standing 5 min  -JS      Tolerance Time- Walking 3 min  -JS      Is your sleep disturbed? Yes  -JS      Difficulties with ADL's? Performs housework in seated position (ie uses chair to unload ), walking & standing limited  -JS         Fall Risk Assessment    Any falls in the past year: Yes  -JS      Number of falls reported in the last 12 months 1  -JS       Factors that contributed to the fall: Slippery surface   slipped when  attempted to help pt stand  -JS         Daily Activities    Primary Language English  -JS      Are you able to read Yes  -JS      Are you able to write Yes  -JS      How does patient learn best? Reading  -JS      Pt Participated in POC and Goals Yes  -JS         Safety    Are you being hurt, hit, or frightened by anyone at home or in your life? No  -JS      Are you being neglected by a caregiver No  -JS        User Key  (r) = Recorded By, (t) = Taken By, (c) = Cosigned By    Initials Name Provider Type    WINSOME Daugherty PT Physical Therapist                PT Ortho     Row Name 04/26/18 5701       Subjective Comments    Subjective Comments Pt reports starting with pain management clinic 1 week ago to address bilateral LE pain.  Reports one of MD's goals is to improve activity, therefore referral to aquatic therapy.  Pt reports lymphedema in L LE & when it worsens it causes difficulty walking resulting in R LE pain.  Concerned about getting in/out of pool  -JS       Subjective Pain    Able to rate subjective pain? yes  -JS    Pre-Treatment Pain Level 4  -JS    Post-Treatment Pain Level 4  -JS       MMT (Manual Muscle Testing)    Additional Documentation General Assessment (Manual Muscle Testing) (Group)  -JS       General Assessment (Manual Muscle Testing)    General Manual Muscle Testing (MMT) Assessment lower extremity strength deficits identified  -JS       Lower Extremity (Manual Muscle Testing)    Lower Extremity: Manual Muscle Testing (MMT) left hip strength deficit;right hip strength deficit;left knee strength deficit;right knee strength deficit;left ankle strength deficit;right ankle strength deficit  -JS       Left Hip (Manual Muscle Testing)    Left Hip Manual Muscle Testing (MMT) flexion;abduction  -JS    MMT: Flexion, Left Hip flexion  -JS    MMT, Gross Movement: Left Hip Flexion (4/5) good  -JS    MMT: ABduction, Left Hip  abduction  -JS    MMT, Gross Movement: Left Hip ABduction (3/5) fair  -JS    MMT, Left Hip: Manual Muscle Testing (MMT) fair core stabilization during hip movements  -JS       Right Hip (Manual Muscle Testing)    Right Hip Manual Muscle Testing (MMT) flexion;abduction  -JS    MMT: Flexion, Right Hip flexion  -JS    MMT, Gross Movement: Right Hip Flexion (4/5) good  -JS    MMT: ABduction, Right Hip abduction  -JS    MMT, Gross Movement: Right Hip ABduction (3/5) fair  -JS    MMT, Right Hip: Manual Muscle Testing (MMT) fair core stabilization during hip movements  -JS       Left Knee (Manual Muscle Testing)    Left Knee Manual Muscle Testing (MMT) extension;flexion  -JS    MMT: Extension, Left Knee extension  -JS    MMT, Gross Movement: Left Knee Extension (4/5) good  -JS    MMT: Flexion, Left Knee flexion  -JS    MMT, Gross Movement: Left Knee Flexion (4+/5) good plus  -JS       Right Knee (Manual Muscle Testing)    Right Knee Manual Muscle Testing (MMT) extension;flexion  -JS    MMT: Extension, Right Knee extension  -JS    MMT, Gross Movement: Right Knee Extension (4+/5) good plus  -JS    MMT: Flexion, Right Knee flexion  -JS    MMT, Gross Movement: Right Knee Flexion (4/5) good  -JS       Left Ankle/Foot (Manual Muscle Testing)    Left Ankle Manual Muscle Testing (MMT) plantarflexion;dorsiflexion  -JS    MMT: Plantarflexion, Left Ankle plantarflexion  -JS    MMT, Gross Movement: Left Ankle Plantarflexion (5/5) normal  -JS    MMT: Dorsiflexion Left Ankle Muscles dorsiflexion  -JS    MMT, Gross Movement: Left Ankle Dorsiflexion (5/5) normal  -JS       Right Ankle/Foot (Manual Muscle Testing)    Right Ankle Manual Muscle Testing (MMT) plantarflexion;dorsiflexion  -JS    MMT: Plantarflexion, Right Ankle plantarflexion  -JS    MMT, Gross Movement: Right Ankle Plantarflexion (5/5) normal  -JS    MMT: Dorsiflexion, Right Ankle Muscles dorsiflexion  -JS    MMT, Gross Movement: Right Ankle Dorsiflexion (5/5) normal  -JS        Sensation    Sensation WNL? WNL  -JS    Light Touch No apparent deficits  -JS       Flexibility    Flexibility Tested? Lower Extremity  -JS       Lower Extremity Flexibility    Hamstrings Bilateral:;Mildly limited  -JS    Hip Flexors Bilateral:;Moderately limited   assessed in sidelying  -JS    Quadriceps Right:;Bilateral:;Moderately limited   assessed in sidelying  -JS       Balance Skills Training    SLS Unable to lift foot to perform SLS balance  -JS       Gait/Stairs Assessment/Training    [place] Level (Gait) independent  -JS    Assistive Device (Gait) cane, straight  -JS    Bilateral Gait Deviations lateral trunk flexion;weight shift ability decreased  -JS    [place] Level (Stairs) independent  -JS    Assistive Device (Stairs) cane, straight  -JS    Handrail Location (Stairs) left side (ascending)  -JS    Number of Steps (Stairs) 4  -JS    Ascending Technique (Stairs) step-to-step  -JS    Descending Technique (Stairs) step-to-step  -JS    Comment (Gait/Stairs) Pt uses cane or rolling walker in community, [place] with distances.    -JS      User Key  (r) = Recorded By, (t) = Taken By, (c) = Cosigned By    Initials Name Provider Type    WINSOME Daugherty PT Physical Therapist                      Therapy Education  Education Details: Initial education in benefits of aquatic exercises to improve mobility, symptom management in the warm water & improve tolerance to exercise.   Given: Symptoms/condition management  Program: New  How Provided: Verbal  Provided to: Patient  Level of Understanding: Verbalized           PT OP Goals     Row Name 04/26/18 1135          PT Short Term Goals    STG Date to Achieve 05/25/18  -     STG 1 Pt will tolerate 45 min aquatic therapy session without increased LE pain  -     STG 1 Progress New  -     STG 2 Pt will report improvement in LE pain from 9/10 to 6/10 or less following aquatic therapy session.  -     STG 2 Progress New  -        Long Term Goals    LTG Date  to Achieve 06/08/18  -JS     LTG 1 Patient will be demonstrate independence with an advanced aquatic program and community resources to facilitate self-management of symptoms  -JS     LTG 1 Progress New  -JS     LTG 2 Patient will increase core and lower extremity strength and balance by improvement in the 5 Times Sit to Stand Test from 22 seconds to 15 seconds or less.  -JS     LTG 2 Progress New  -JS     LTG 3 Patient will improve functional strength & mobility as indicated by improved score on TUG test from 18 seconds to 13 seconds or less with use of single tip cane as needed.  -JS     LTG 3 Progress New  -JS     LTG 4 Pt will report improvement in perceived disability as indicated by improved score on LEFS from 41/80 to 50/80 or better.  -JS     LTG 4 Progress New  -JS     LTG 5 Pt will improve strength & endurance to allow ambulation x 10 min in community/at home (compared to reported tolerance of 3 min ambulation at eval) for improved mobility & tolerance to exercise & functional activities.  -JS     LTG 5 Progress New  -JS        Time Calculation    PT Goal Re-Cert Due Date 07/26/18  -       User Key  (r) = Recorded By, (t) = Taken By, (c) = Cosigned By    Initials Name Provider Type    WINSOME Daugherty PT Physical Therapist                PT Assessment/Plan     Row Name 04/26/18 4246          PT Assessment    Functional Limitations Impaired gait;Decreased safety during functional activities;Impaired locomotion;Limitations in community activities;Limitations in functional capacity and performance  -     Impairments Balance;Locomotion;Pain;Gait;Impaired flexibility;Muscle strength  -     Assessment Comments Pt is a 58 y/o female with c/o bilateral LE pain x past several years.  Pt with history of breast CA s/p chemo treatments, with resultant L LE lymphedema that worsened with complications of sepsis 8 years ago.  Pt reports difficulty walking when lymphedema increases which has resulted in increased R  lower leg pain as well.  Pt seen last week in pain management with recommendations of increasing activity & subsequent referral to aquatic therapy.  Pt presents with decreased core strength & stabilization, decreased functional LE strength & balance as indicated by 5x sit to stand & TUG tests & decreased flexibility which contribute to abnormal gait pattern.  Pt demonstrates decreased endurance during treatment session, with SOA noted following ambulation, stair climbing & sit to stand testing.  Pt expresses concern of getting in/out of pool, though demonstrates ability to climb 4 stairs independently with use of rail & cane, therefore willing to try aquatic therapy.  Pt will benefit from skilled PT in the aquatic environment to address impairments & progress towards goals, including pt's personal goal of improving mobility.  -JS     Please refer to paper survey for additional self-reported information Yes  -JS     Rehab Potential Good  -JS     Patient/caregiver participated in establishment of treatment plan and goals Yes  -JS     Patient would benefit from skilled therapy intervention Yes  -JS        PT Plan    PT Frequency 2x/week  -JS     Predicted Duration of Therapy Intervention (OT Eval) 6 weeks  -JS     Planned CPT's? PT EVAL AQUA: 69327;PT RE-EVAL: 45668;PT AQUATIC THERAPY EA 15 MIN: 21609;PT THER PROC EA 15 MIN: 17674  -JS     Physical Therapy Interventions (Optional Details) aquatics exercise;home exercise program;patient/family education  -JS     PT Plan Comments Pt to call to schedule aquatic therapy appointments, with recommendations of aquatic therapy 2x/week x 6 weeks to address core strength/stabilization, LE strength, gait & balance, as well as to improve pt's endurance & tolerance to exercise.   -JS       User Key  (r) = Recorded By, (t) = Taken By, (c) = Cosigned By    Initials Name Provider Type    WINSOME Daugherty PT Physical Therapist                  Exercises     Row Name 04/26/18 8395              Subjective Comments    Subjective Comments Pt reports starting with pain management clinic 1 week ago to address bilateral LE pain.  Reports one of MD's goals is to improve activity, therefore referral to aquatic therapy.  Pt reports lymphedema in L LE & when it worsens it causes difficulty walking resulting in R LE pain.  Concerned about getting in/out of pool  -JS         Subjective Pain    Able to rate subjective pain? yes  -JS      Pre-Treatment Pain Level 4  -JS      Post-Treatment Pain Level 4  -JS        User Key  (r) = Recorded By, (t) = Taken By, (c) = Cosigned By    Initials Name Provider Type    WINSOME Daugherty PT Physical Therapist                        Outcome Measure Options: 5x Sit to Stand, Lower Extremity Functional Scale (LEFS), Timed Up and Go (TUG)  5 Times Sit to Stand  5 Times Sit to Stand (seconds): 22 seconds  5 Times Sit to Stand Comments: With use of bilateral UEs from mat treatment table in [place] [facility] to accomodate pt's size  Lower Extremity Functional Index  Any of your usual work, housework or school activities: No difficulty  Your usual hobbies, recreational or sporting activities: No difficulty  Getting into or out of the bath: A little bit of difficulty  Walking between rooms: No difficulty  Putting on your shoes or socks: No difficulty  Squatting: Quite a bit of difficulty  Lifting an object, like a bag of groceries from the floor: No difficulty  Performing light activities around your home: No difficulty  Performing heavy activities around your home: Quite a bit of difficulty  Getting into or out of a car: No difficulty  Walking 2 blocks: Quite a bit of difficulty  Walking a mile: Extreme difficulty or unable to perform activity  Going up or down 10 stairs (about 1 flight of stairs): Quite a bit of difficulty  Standing for 1 hour: Extreme difficulty or unable to perform activity  Sitting for 1 hour: No difficulty  Running on even ground: Extreme difficulty or unable to  perform activity  Running on uneven ground: Extreme difficulty or unable to perform activity  Making sharp turns while running fast: Extreme difficulty or unable to perform activity  Hopping: Extreme difficulty or unable to perform activity  Rolling over in bed: Moderate difficulty  Total: 41  Timed Up and Go (TUG)  TUG Test 1: 18 seconds  Timed Up and Go Comments: Use of single tip cane from chair surface      Time Calculation:   Start Time: 1135  Stop Time: 1220  Time Calculation (min): 45 min     Therapy Charges for Today     Code Description Service Date Service Provider Modifiers Qty    83780664638 HC PT EVAL MOD COMPLEXITY 3 4/26/2018 Bridget Daugherty, PT GP 1          PT G-Codes  Outcome Measure Options: 5x Sit to Stand, Lower Extremity Functional Scale (LEFS), Timed Up and Go (TUG)         Bridget Daugherty PT  4/26/2018

## 2018-05-09 DIAGNOSIS — E11.9 CONTROLLED TYPE 2 DIABETES MELLITUS WITHOUT COMPLICATION, WITH LONG-TERM CURRENT USE OF INSULIN (HCC): ICD-10-CM

## 2018-05-09 DIAGNOSIS — Z79.4 CONTROLLED TYPE 2 DIABETES MELLITUS WITHOUT COMPLICATION, WITH LONG-TERM CURRENT USE OF INSULIN (HCC): ICD-10-CM

## 2018-05-11 RX ORDER — INSULIN DETEMIR 100 [IU]/ML
60 INJECTION, SOLUTION SUBCUTANEOUS 2 TIMES DAILY
Qty: 3 ML | Refills: 1 | Status: SHIPPED | OUTPATIENT
Start: 2018-05-11 | End: 2018-06-21 | Stop reason: SDUPTHER

## 2018-05-18 DIAGNOSIS — F32.A DEPRESSION, UNSPECIFIED DEPRESSION TYPE: ICD-10-CM

## 2018-05-18 RX ORDER — DULOXETIN HYDROCHLORIDE 60 MG/1
CAPSULE, DELAYED RELEASE ORAL
Qty: 90 CAPSULE | Refills: 0 | Status: SHIPPED | OUTPATIENT
Start: 2018-05-18 | End: 2018-08-07 | Stop reason: SDUPTHER

## 2018-06-12 ENCOUNTER — OFFICE VISIT (OUTPATIENT)
Dept: CARDIOLOGY | Facility: CLINIC | Age: 58
End: 2018-06-12

## 2018-06-12 VITALS
DIASTOLIC BLOOD PRESSURE: 86 MMHG | HEIGHT: 68 IN | HEART RATE: 70 BPM | WEIGHT: 293 LBS | BODY MASS INDEX: 44.41 KG/M2 | SYSTOLIC BLOOD PRESSURE: 110 MMHG

## 2018-06-12 DIAGNOSIS — Q23.1 BICUSPID AORTIC VALVE: Primary | ICD-10-CM

## 2018-06-12 DIAGNOSIS — R01.1 SYSTOLIC MURMUR: ICD-10-CM

## 2018-06-12 DIAGNOSIS — I35.0 NONRHEUMATIC AORTIC VALVE STENOSIS: ICD-10-CM

## 2018-06-12 DIAGNOSIS — I77.810 ASCENDING AORTA DILATATION (HCC): ICD-10-CM

## 2018-06-12 PROCEDURE — 99213 OFFICE O/P EST LOW 20 MIN: CPT | Performed by: INTERNAL MEDICINE

## 2018-06-12 RX ORDER — GABAPENTIN 300 MG/1
300 CAPSULE ORAL 3 TIMES DAILY
COMMUNITY
End: 2019-02-25 | Stop reason: SDUPTHER

## 2018-06-13 ENCOUNTER — DOCUMENTATION (OUTPATIENT)
Dept: PHYSICAL THERAPY | Facility: HOSPITAL | Age: 58
End: 2018-06-13

## 2018-06-13 DIAGNOSIS — R26.2 DIFFICULTY WALKING: ICD-10-CM

## 2018-06-13 DIAGNOSIS — M79.604 RIGHT LEG PAIN: Primary | ICD-10-CM

## 2018-06-13 DIAGNOSIS — M79.605 LEFT LEG PAIN: ICD-10-CM

## 2018-06-13 NOTE — THERAPY DISCHARGE NOTE
Outpatient Physical Therapy Discharge Summary         Patient Name: Juana Hall  : 1960  MRN: [id number]    Today's Date: 2018    Visit Dx:    ICD-10-CM ICD-9-CM   1. Right leg pain M79.604 729.5   2. Left leg pain M79.605 729.5   3. Difficulty walking R26.2 719.7             PT OP Goals     Row Name 18 1000          PT Short Term Goals    STG Date to Achieve 18  -     STG 1 Pt will tolerate 45 min aquatic therapy session without increased LE pain  -     STG 1 Progress Not Met  -     STG 2 Pt will report improvement in LE pain from 9/10 to 6/10 or less following aquatic therapy session.  -     STG 2 Progress Not Met  -        Long Term Goals    LTG Date to Achieve 18  -     LTG 1 Patient will be demonstrate independence with an advanced aquatic program and community resources to facilitate self-management of symptoms  -     LTG 1 Progress Not Met  -     LTG 2 Patient will increase core and lower extremity strength and balance by improvement in the 5 Times Sit to Stand Test from 22 seconds to 15 seconds or less.  -     LTG 2 Progress Not Met  -     LTG 3 Patient will improve functional strength & mobility as indicated by improved score on TUG test from 18 seconds to 13 seconds or less with use of single tip cane as needed.  -     LTG 3 Progress Not Met  -     LTG 4 Pt will report improvement in perceived disability as indicated by improved score on LEFS from 41/80 to 50/80 or better.  -     LTG 4 Progress Not Met  -     LTG 5 Pt will improve strength & endurance to allow ambulation x 10 min in community/at home (compared to reported tolerance of 3 min ambulation at [facility]) for improved mobility & tolerance to exercise & functional activities.  -     LTG 5 Progress Not Met  -       User Key  (r) = Recorded By, (t) = Taken By, (c) = Cosigned By    Initials Name Provider Type    MONICA Grover PT Physical Therapist          OP PT Discharge  Summary  Date of Discharge: 06/13/18  Reason for Discharge: other (comment) (Patient never scheduled any follow up appts. after her initial evaluation. Discharged after >30 days without treatment)  Outcomes Achieved: Unable to make functional progress toward goals at this time  Discharge Destination: Home without follow-up      Time Calculation:        Therapy Suggested Charges     Code   Minutes Charges    None                       Juana Grover, PT  6/13/2018

## 2018-06-15 DIAGNOSIS — Z79.4 CONTROLLED TYPE 2 DIABETES MELLITUS WITHOUT COMPLICATION, WITH LONG-TERM CURRENT USE OF INSULIN (HCC): ICD-10-CM

## 2018-06-15 DIAGNOSIS — E11.9 CONTROLLED TYPE 2 DIABETES MELLITUS WITHOUT COMPLICATION, WITH LONG-TERM CURRENT USE OF INSULIN (HCC): ICD-10-CM

## 2018-06-18 RX ORDER — INSULIN DETEMIR 100 [IU]/ML
INJECTION, SOLUTION SUBCUTANEOUS
Refills: 0 | OUTPATIENT
Start: 2018-06-18

## 2018-06-21 DIAGNOSIS — Z79.4 CONTROLLED TYPE 2 DIABETES MELLITUS WITHOUT COMPLICATION, WITH LONG-TERM CURRENT USE OF INSULIN (HCC): ICD-10-CM

## 2018-06-21 DIAGNOSIS — E11.9 CONTROLLED TYPE 2 DIABETES MELLITUS WITHOUT COMPLICATION, WITH LONG-TERM CURRENT USE OF INSULIN (HCC): ICD-10-CM

## 2018-06-22 RX ORDER — INSULIN DETEMIR 100 [IU]/ML
INJECTION, SOLUTION SUBCUTANEOUS
Qty: 3 ML | Refills: 2 | Status: SHIPPED | OUTPATIENT
Start: 2018-06-22 | End: 2018-08-28 | Stop reason: SDUPTHER

## 2018-06-25 NOTE — PROGRESS NOTES
Date of Office Visit: 2018  Encounter Provider: Pasha Harkins MD  Place of Service: [facility] CARDIOLOGY  Patient Name: Juana Hall  :1960    Chief complaint: Follow-up for bicuspid aortic valve, mild aortic stenosis, mildly   dilated ascending aorta.    History of Present Illness:    I again had the pleasure of seeing the patient in cardiology office on 2018.  She is   a very pleasant 57 year-old white female with a past medical history significant for a   bicuspid aortic valve, aortic stenosis, prior breast cancer, diabetes, and chronic kidney   disease who presents for follow-up.  I initially saw her on 2017.  The patient   underwent an echocardiogram on 10/23/2017 for a systolic murmur.  This showed a   likely bicuspid aortic valve which was heavily calcified.  She had mild aortic stenosis   with a peak gradient of 19 mmHg, and a mean gradient of 11 mmHg.  Her father also   had a ruptured thoracic aortic aneurysm.  Both of her parents had coronary artery   disease, with her mother having a fatal MI at age 72, and her father having a fatal MI at   age 74.  She did undergo a CT scan of the chest without contrast on 2017   (contrast was not used secondary to her advanced kidney disease).  This did not show   any evidence of aneurysm, although her ascending aorta was mildly dilated at 3.8 cm.    The patient presents today for follow-up.  She has been doing fairly well, and has no   significant complaints today.  She still has baseline shortness of breath, although this   has not increased since her last visit.  She denied any chest discomfort, syncope, or   presyncope.    Past Medical History:   Diagnosis Date   • Anemia    • Anxiety    • Anxiety and depression    • Arthritis    • Bicuspid aortic valve    • Breast cancer     RIGHT, HAD NEELA. MASTECTOMY, CHEMO AND RADIATION   • Cancer    • CKD (chronic kidney disease)    • Depression    •  Diabetes mellitus    • Diabetes mellitus, type 2    • Foot fracture, right     METATARSAL 3-5, WEARING POST OP SHOE   • Frequent UTI    • Heart murmur    • Hyperlipidemia    • Hypertension    • Hyperthyroidism    • Hypothyroidism    • Kidney stone    • Kidney stones    • Lymphedema of leg    • MRSA infection     WITH BREAST IMPLANT SURGERY   • Neuromuscular disorder    • Obesity    • Osteoarthrosis    • Pap smear, as part of routine gynecological examination     normal   • Post-menopausal bleeding    • Radiculopathy    • Sepsis     FROM KIDNEY STONE   • Thickened endometrium    • Tremor        Past Surgical History:   Procedure Laterality Date   • BREAST RECONSTRUCTION      WITH IMPLANTS, AND REVISION, NOW REMOVED   • BREAST SURGERY     •  SECTION  1987   •  SECTION  1987   • D&C HYSTEROSCOPY N/A 2017    Procedure: DILATATION AND CURETTAGE, HYSTEROSCOPY ;  Surgeon: Cherie Oliveros MD;  Location: [facility] [place] [facility] [place];  Service:    • LYMPH NODE BIOPSY     • MASTECTOMY Bilateral 2003   • MASTECTOMY Bilateral        Current Outpatient Prescriptions on File Prior to Visit   Medication Sig Dispense Refill   • amLODIPine (NORVASC) 10 MG tablet Take 10 mg by mouth Every Morning.     • amoxicillin (AMOXIL) 500 MG capsule Take 4 capsules (2,000 mg total) by mouth See Admin Instructions 4 capsules 1 hour prior to procedure 4 capsule 6   • aspirin 81 MG EC tablet Take 81 mg by mouth Daily     • atorvastatin (LIPITOR) 40 MG tablet Take 1 tablet by mouth Daily. 90 tablet 1   • carvedilol (COREG) 3.125 MG tablet Take 1 tablet by mouth 2 (Two) Times a Day. 60 tablet 11   • Dulaglutide 1.5 MG/0.5ML solution pen-injector Inject 1.5 mg under the skin 1 (One) Time Per Week.  pen 1   • DULoxetine (CYMBALTA) 60 MG capsule TAKE ONE CAPSULE BY MOUTH DAILY 90 capsule 0   • glucose blood test strip 1 each by Other route 3 (Three) Times a Day. Use as instructed 100 each 5   •  "levothyroxine (SYNTHROID, LEVOTHROID) 50 MCG tablet Take 1 tablet by mouth Daily. 90 tablet 1   • repaglinide (PRANDIN) 2 MG tablet Take 1 tablet by mouth 3 (Three) Times a Day Before Meals. 270 tablet 1     No current facility-administered medications on file prior to visit.      Allergies as of 2018   • (No Known Allergies)     Social History     Social History   • Marital status:      Spouse name: N/A   • Number of children: N/A   • Years of education: N/A     Occupational History   • Not on file.     Social History Main Topics   • Smoking status: Never Smoker   • Smokeless tobacco: Never Used   • Alcohol use No   • Drug use: No   • Sexual activity: Yes     Partners: Male     Birth control/ protection: Post-menopausal     Other Topics Concern   • Not on file     Social History Narrative   • No narrative on file     Family History   Problem Relation Age of Onset   • Coronary artery disease Mother         Mother  from MI at 72   • Diabetes Mother    • Breast cancer Mother    • Hyperlipidemia Mother    • Arthritis Mother    • Cancer Mother    • Aortic aneurysm Father         Father with ruptured thoracic aortic aneurysm   • Coronary artery disease Father         Father  from MI at 74   • Heart disease Father    • Hyperlipidemia Father    • Arthritis Father    • Coronary artery disease Maternal Grandmother         MGM deceeased from MI at age 76       Review of Systems   Constitution: Positive for malaise/fatigue.   Cardiovascular: Positive for dyspnea on exertion.   Genitourinary: Positive for frequency.   All other systems reviewed and are negative.     Objective:     Vitals:    18 1026   BP: 110/86   BP Location: Other (Comment)   Cuff Size: Adult   Pulse: 70   Weight: (!) 187 kg (413 lb 3.2 oz)   Height: 172.7 cm (68\")     Body mass index is 62.83 kg/m².    Physical Exam   Constitutional: She is oriented to person, place, and time. She appears well-developed and well-nourished. "   HENT:   Head: Normocephalic and atraumatic.   Eyes: Conjunctivae are normal.   Neck: Neck supple.   Cardiovascular: Normal rate and regular rhythm.  Exam reveals no gallop and no friction rub.    Murmur heard.   Systolic murmur is present with a grade of 3/6  at the upper right sternal border, upper left sternal border  Pulmonary/Chest: Effort normal and breath sounds normal.   Abdominal: Soft. There is no tenderness.   Musculoskeletal: She exhibits no edema.   Neurological: She is alert and oriented to person, place, and time.   Skin: Skin is warm.   Psychiatric: She has a normal mood and affect. Her behavior is normal.     Lab Review:   Procedures    Cardiac Procedures:  1. Echocardiogram on 10/23/2017: The ejection fraction was low normal at 50-55%. There   was mild LVH.  There was grade 1 diastolic dysfunction.  The left atrium was mildly dilated.    A bicuspid aortic valve could not be excluded.  The aortic valve leaflets were heavily   calcified.  There was mild aortic stenosis with a peak gradient of 19 mmHg, and a mean   gradient of 11 mmHg.  2. CT scan of the chest without contrast on 12/13/2017: The ascending aorta was mildly   dilated at 3.8 cm.  There was no aortic aneurysm.    Assessment:       Diagnosis Plan   1. Bicuspid aortic valve     2. Systolic murmur     3. Nonrheumatic aortic valve stenosis       Plan:       Again, the patient seems to be stable at this point.  She does have a very mildly dilated aorta   on her CT scan at 3.8 cm.  However, there was no evidence of aneurysm.  Her father did   have a ruptured thoracic aneurysm, and it will be important to follow this with serial CT scans   in the future.  She likely does not need another CT scan at least 2 years out from the initial   study.  I will likely recheck an echocardiogram next year to reevaluate the bicuspid aortic   valve as well as.  She did not have significant aortic insufficiency on the previous echo, and   the aortic stenosis  was mild.  She is not having any symptoms from aortic stenosis.  I did   advise her to take antibiotics prior to dental appointments and dental cleanings.  Her blood   pressure has been running better since making adjustments as an outpatient.  She is   currently on amlodipine at 10 mg per day, and carvedilol 3.125 mg twice a day.  She does   have advanced chronic kidney disease, and this must be taken into consideration for any   antihypertensive medications in the future.  She is taking aspirin and Lipitor for preventative   reasons given her diabetes, chronic kidney disease, and family history of coronary artery   disease.  For now, I will plan on seeing her back in the office within 6 months unless other   issues arise.

## 2018-08-06 DIAGNOSIS — E11.9 CONTROLLED TYPE 2 DIABETES MELLITUS WITHOUT COMPLICATION, WITH LONG-TERM CURRENT USE OF INSULIN (HCC): ICD-10-CM

## 2018-08-06 DIAGNOSIS — Z79.4 CONTROLLED TYPE 2 DIABETES MELLITUS WITHOUT COMPLICATION, WITH LONG-TERM CURRENT USE OF INSULIN (HCC): ICD-10-CM

## 2018-08-06 RX ORDER — DULAGLUTIDE 1.5 MG/.5ML
INJECTION, SOLUTION SUBCUTANEOUS
Refills: 0 | OUTPATIENT
Start: 2018-08-06

## 2018-08-07 DIAGNOSIS — F32.A DEPRESSION, UNSPECIFIED DEPRESSION TYPE: ICD-10-CM

## 2018-08-07 RX ORDER — DULOXETIN HYDROCHLORIDE 60 MG/1
CAPSULE, DELAYED RELEASE ORAL
Qty: 30 CAPSULE | Refills: 0 | Status: SHIPPED | OUTPATIENT
Start: 2018-08-07 | End: 2018-08-28 | Stop reason: SDUPTHER

## 2018-08-28 ENCOUNTER — OFFICE VISIT (OUTPATIENT)
Dept: FAMILY MEDICINE CLINIC | Facility: CLINIC | Age: 58
End: 2018-08-28

## 2018-08-28 VITALS
DIASTOLIC BLOOD PRESSURE: 73 MMHG | HEIGHT: 68 IN | BODY MASS INDEX: 44.41 KG/M2 | WEIGHT: 293 LBS | TEMPERATURE: 98.3 F | RESPIRATION RATE: 20 BRPM | SYSTOLIC BLOOD PRESSURE: 144 MMHG | OXYGEN SATURATION: 94 % | HEART RATE: 81 BPM

## 2018-08-28 DIAGNOSIS — Z79.4 CONTROLLED TYPE 2 DIABETES MELLITUS WITHOUT COMPLICATION, WITH LONG-TERM CURRENT USE OF INSULIN (HCC): ICD-10-CM

## 2018-08-28 DIAGNOSIS — E78.2 MIXED HYPERLIPIDEMIA: ICD-10-CM

## 2018-08-28 DIAGNOSIS — F32.A DEPRESSION, UNSPECIFIED DEPRESSION TYPE: ICD-10-CM

## 2018-08-28 DIAGNOSIS — E11.9 CONTROLLED TYPE 2 DIABETES MELLITUS WITHOUT COMPLICATION, WITH LONG-TERM CURRENT USE OF INSULIN (HCC): ICD-10-CM

## 2018-08-28 DIAGNOSIS — E03.9 ACQUIRED HYPOTHYROIDISM: ICD-10-CM

## 2018-08-28 PROCEDURE — 99214 OFFICE O/P EST MOD 30 MIN: CPT | Performed by: NURSE PRACTITIONER

## 2018-08-28 RX ORDER — ATORVASTATIN CALCIUM 40 MG/1
40 TABLET, FILM COATED ORAL DAILY
Qty: 90 TABLET | Refills: 1 | Status: SHIPPED | OUTPATIENT
Start: 2018-08-28 | End: 2019-02-25 | Stop reason: SDUPTHER

## 2018-08-28 RX ORDER — INSULIN DETEMIR 100 [IU]/ML
50 INJECTION, SOLUTION SUBCUTANEOUS 2 TIMES DAILY
Qty: 3 ML | Refills: 2 | Status: SHIPPED | OUTPATIENT
Start: 2018-08-28 | End: 2018-10-21 | Stop reason: SDUPTHER

## 2018-08-28 RX ORDER — LEVOTHYROXINE SODIUM 0.05 MG/1
50 TABLET ORAL DAILY
Qty: 90 TABLET | Refills: 1 | Status: SHIPPED | OUTPATIENT
Start: 2018-08-28 | End: 2019-02-25 | Stop reason: SDUPTHER

## 2018-08-28 RX ORDER — DULOXETIN HYDROCHLORIDE 60 MG/1
60 CAPSULE, DELAYED RELEASE ORAL DAILY
Qty: 90 CAPSULE | Refills: 1 | Status: SHIPPED | OUTPATIENT
Start: 2018-08-28 | End: 2019-02-25 | Stop reason: SDUPTHER

## 2018-08-28 RX ORDER — REPAGLINIDE 2 MG/1
2 TABLET ORAL
Qty: 270 TABLET | Refills: 1 | Status: SHIPPED | OUTPATIENT
Start: 2018-08-28 | End: 2019-02-25 | Stop reason: SDUPTHER

## 2018-08-28 NOTE — PROGRESS NOTES
Subjective   Juana Hall is a 57 y.o. female.     History of Present Illness   Juana Hall 57 y.o. female who presents today for routine follow up check and medication refills.  she has a history of   Patient Active Problem List   Diagnosis   • Anxiety   • Depression   • Diabetes type 2, controlled (CMS/HCC)   • Hyperlipidemia   • Heart murmur   • Hypertension   • Post-traumatic osteoarthritis of multiple joints   • Psoriasis   • Radiculopathy   • Acquired hypothyroidism   .  Since the last visit, she has overall felt well.  She has Hypertenision and is well controlled on medication and hyperlipidemia and DM2.  She will needs labs to assess control.  she has been compliant with current medications have reviewed them.  The patient denies medication side effects.    Results for orders placed or performed in visit on 03/19/18   Comprehensive metabolic panel   Result Value Ref Range    Glucose 139 (H) 65 - 99 mg/dL    BUN 33 (H) 6 - 20 mg/dL    Creatinine 1.57 (H) 0.57 - 1.00 mg/dL    eGFR Non African Am 34 (L) >60 mL/min/1.73    eGFR  Am 41 (L) >60 mL/min/1.73    BUN/Creatinine Ratio 21.0 7.0 - 25.0    Sodium 144 136 - 145 mmol/L    Potassium 5.6 (H) 3.5 - 5.2 mmol/L    Chloride 102 98 - 107 mmol/L    Total CO2 29.2 (H) 22.0 - 29.0 mmol/L    Calcium 9.7 8.6 - 10.5 mg/dL    Total Protein 7.4 6.0 - 8.5 g/dL    Albumin 4.30 3.50 - 5.20 g/dL    Globulin 3.1 gm/dL    A/G Ratio 1.4 g/dL    Total Bilirubin 0.6 0.1 - 1.2 mg/dL    Alkaline Phosphatase 88 39 - 117 U/L    AST (SGOT) 14 1 - 32 U/L    ALT (SGPT) 18 1 - 33 U/L   Lipid panel   Result Value Ref Range    Total Cholesterol 149 0 - 200 mg/dL    Triglycerides 100 0 - 150 mg/dL    HDL Cholesterol 46 40 - 60 mg/dL    VLDL Cholesterol 20 5 - 40 mg/dL    LDL Cholesterol  83 0 - 100 mg/dL   TSH   Result Value Ref Range    TSH 3.200 0.270 - 4.200 mIU/mL   Hemoglobin A1c   Result Value Ref Range    Hemoglobin A1C 6.10 (H) 4.80 - 5.60 %   MicroAlbumin, Urine,  Random - Urine, Clean Catch   Result Value Ref Range    Microalbumin, Urine 217.3 Not Estab. ug/mL   CBC and Differential   Result Value Ref Range    WBC 9.02 4.50 - 10.70 10*3/mm3    RBC 4.18 3.90 - 5.20 10*6/mm3    Hemoglobin 11.5 (L) 11.9 - 15.5 g/dL    Hematocrit 38.9 35.6 - 45.5 %    MCV 93.1 80.5 - 98.2 fL    MCH 27.5 26.9 - 32.0 pg    MCHC 29.6 (L) 32.4 - 36.3 g/dL    RDW 15.4 (H) 11.7 - 13.0 %    Platelets 274 140 - 500 10*3/mm3    Neutrophil Rel % 72.0 42.7 - 76.0 %    Lymphocyte Rel % 17.6 (L) 19.6 - 45.3 %    Monocyte Rel % 5.8 5.0 - 12.0 %    Eosinophil Rel % 4.0 0.3 - 6.2 %    Basophil Rel % 0.4 0.0 - 1.5 %    Neutrophils Absolute 6.49 1.90 - 8.10 10*3/mm3    Lymphocytes Absolute 1.59 0.90 - 4.80 10*3/mm3    Monocytes Absolute 0.52 0.20 - 1.20 10*3/mm3    Eosinophils Absolute 0.36 0.00 - 0.70 10*3/mm3    Basophils Absolute 0.04 0.00 - 0.20 10*3/mm3    Immature Granulocyte Rel % 0.2 0.0 - 0.5 %    Immature Grans Absolute 0.02 0.00 - 0.03 10*3/mm3     Has decreased levemir to 50 units BID as she is not eating carbs.  She will only do once daily on some days if her glucose has been running low.  Reports it was 104 before lunch today and has been in the 140s in the morning.  She was having a few low readings in the mornings from 49-60s so held the second dose of Levemir.   The following portions of the patient's history were reviewed and updated as appropriate: allergies, current medications, past family history, past medical history, past social history, past surgical history and problem list.    Review of Systems   Constitutional: Negative for unexpected weight change.   Respiratory: Negative for shortness of breath.    Cardiovascular: Negative for chest pain and palpitations.   Endocrine: Negative for cold intolerance, heat intolerance, polydipsia, polyphagia and polyuria.   Psychiatric/Behavioral: Negative for behavioral problems.       Objective   Physical Exam   Constitutional: She is oriented to  person, place, and time. She appears well-developed and well-nourished.   Neck: Carotid bruit is not present.   Cardiovascular: Normal rate and regular rhythm.    Pulmonary/Chest: Effort normal and breath sounds normal.   Neurological: She is alert and oriented to person, place, and time.   Psychiatric: She has a normal mood and affect. Judgment normal.   Nursing note and vitals reviewed.      Assessment/Plan   Juana was seen today for hyperlipidemia, hypertension and diabetes.    Diagnoses and all orders for this visit:    Mixed hyperlipidemia  -     atorvastatin (LIPITOR) 40 MG tablet; Take 1 tablet by mouth Daily.  -     Comprehensive metabolic panel  -     Lipid panel  -     CBC and Differential  -     TSH  -     Hemoglobin A1c    Controlled type 2 diabetes mellitus without complication, with long-term current use of insulin (CMS/[facility])  -     Dulaglutide 1.5 MG/0.5ML solution pen-injector; Inject 1.5 mg under the skin into the appropriate area as directed 1 (One) Time Per Week. MONDAYS  -     LEVEMIR FLEXTOUCH 100 UNIT/ML injection; Inject 50 Units under the skin into the appropriate area as directed 2 (Two) Times a Day.  -     repaglinide (PRANDIN) 2 MG tablet; Take 1 tablet by mouth 3 (Three) Times a Day Before Meals.  -     Comprehensive metabolic panel  -     Lipid panel  -     CBC and Differential  -     TSH  -     Hemoglobin A1c    Depression, unspecified depression type  -     DULoxetine (CYMBALTA) 60 MG capsule; Take 1 capsule by mouth Daily.    Acquired hypothyroidism  -     levothyroxine (SYNTHROID, LEVOTHROID) 50 MCG tablet; Take 1 tablet by mouth Daily.  -     Comprehensive metabolic panel  -     Lipid panel  -     CBC and Differential  -     TSH  -     Hemoglobin A1c    Other orders  -     Cancel: glucose blood test strip; 1 each by Other route 3 (Three) Times a Day. Use as instructed        Patient is seeing Dr. Aragon with pain management.  She is taking tramadol and gabapentin.

## 2018-10-19 LAB
ALBUMIN SERPL-MCNC: 3.8 G/DL (ref 3.5–5.2)
ALBUMIN/GLOB SERPL: 1 G/DL
ALP SERPL-CCNC: 93 U/L (ref 39–117)
ALT SERPL-CCNC: 9 U/L (ref 1–33)
AST SERPL-CCNC: 9 U/L (ref 1–32)
BASOPHILS # BLD AUTO: 0.02 10*3/MM3 (ref 0–0.2)
BASOPHILS NFR BLD AUTO: 0.3 % (ref 0–1.5)
BILIRUB SERPL-MCNC: 0.7 MG/DL (ref 0.1–1.2)
BUN SERPL-MCNC: 32 MG/DL (ref 8–23)
BUN/CREAT SERPL: 17.7 (ref 7–25)
CALCIUM SERPL-MCNC: 9.1 MG/DL (ref 8.6–10.5)
CHLORIDE SERPL-SCNC: 103 MMOL/L (ref 98–107)
CHOLEST SERPL-MCNC: 118 MG/DL (ref 0–200)
CO2 SERPL-SCNC: 28.3 MMOL/L (ref 22–29)
CREAT SERPL-MCNC: 1.81 MG/DL (ref 0.57–1)
EOSINOPHIL # BLD AUTO: 0.33 10*3/MM3 (ref 0–0.7)
EOSINOPHIL NFR BLD AUTO: 4.2 % (ref 0.3–6.2)
ERYTHROCYTE [DISTWIDTH] IN BLOOD BY AUTOMATED COUNT: 15.5 % (ref 11.7–13)
GLOBULIN SER CALC-MCNC: 3.7 GM/DL
GLUCOSE SERPL-MCNC: 101 MG/DL (ref 65–99)
HBA1C MFR BLD: 6.18 % (ref 4.8–5.6)
HCT VFR BLD AUTO: 35.7 % (ref 35.6–45.5)
HDLC SERPL-MCNC: 38 MG/DL (ref 40–60)
HGB BLD-MCNC: 10.7 G/DL (ref 11.9–15.5)
IMM GRANULOCYTES # BLD: 0.03 10*3/MM3 (ref 0–0.03)
IMM GRANULOCYTES NFR BLD: 0.4 % (ref 0–0.5)
LDLC SERPL CALC-MCNC: 57 MG/DL (ref 0–100)
LYMPHOCYTES # BLD AUTO: 1.56 10*3/MM3 (ref 0.9–4.8)
LYMPHOCYTES NFR BLD AUTO: 19.7 % (ref 19.6–45.3)
MCH RBC QN AUTO: 26.8 PG (ref 26.9–32)
MCHC RBC AUTO-ENTMCNC: 30 G/DL (ref 32.4–36.3)
MCV RBC AUTO: 89.5 FL (ref 80.5–98.2)
MONOCYTES # BLD AUTO: 0.78 10*3/MM3 (ref 0.2–1.2)
MONOCYTES NFR BLD AUTO: 9.9 % (ref 5–12)
NEUTROPHILS # BLD AUTO: 5.22 10*3/MM3 (ref 1.9–8.1)
NEUTROPHILS NFR BLD AUTO: 65.9 % (ref 42.7–76)
PLATELET # BLD AUTO: 300 10*3/MM3 (ref 140–500)
POTASSIUM SERPL-SCNC: 5 MMOL/L (ref 3.5–5.2)
PROT SERPL-MCNC: 7.5 G/DL (ref 6–8.5)
RBC # BLD AUTO: 3.99 10*6/MM3 (ref 3.9–5.2)
SODIUM SERPL-SCNC: 144 MMOL/L (ref 136–145)
TRIGL SERPL-MCNC: 117 MG/DL (ref 0–150)
TSH SERPL DL<=0.005 MIU/L-ACNC: 3.52 MIU/ML (ref 0.27–4.2)
VLDLC SERPL CALC-MCNC: 23.4 MG/DL (ref 5–40)
WBC # BLD AUTO: 7.91 10*3/MM3 (ref 4.5–10.7)

## 2018-10-21 DIAGNOSIS — Z79.4 CONTROLLED TYPE 2 DIABETES MELLITUS WITHOUT COMPLICATION, WITH LONG-TERM CURRENT USE OF INSULIN (HCC): ICD-10-CM

## 2018-10-21 DIAGNOSIS — E11.9 CONTROLLED TYPE 2 DIABETES MELLITUS WITHOUT COMPLICATION, WITH LONG-TERM CURRENT USE OF INSULIN (HCC): ICD-10-CM

## 2018-10-22 RX ORDER — INSULIN DETEMIR 100 [IU]/ML
INJECTION, SOLUTION SUBCUTANEOUS
Qty: 1 PEN | Refills: 1 | Status: SHIPPED | OUTPATIENT
Start: 2018-10-22 | End: 2019-02-25 | Stop reason: SDUPTHER

## 2018-12-12 ENCOUNTER — OFFICE VISIT (OUTPATIENT)
Dept: CARDIOLOGY | Facility: CLINIC | Age: 58
End: 2018-12-12

## 2018-12-12 VITALS
HEART RATE: 74 BPM | HEIGHT: 68 IN | DIASTOLIC BLOOD PRESSURE: 74 MMHG | SYSTOLIC BLOOD PRESSURE: 130 MMHG | OXYGEN SATURATION: 96 % | BODY MASS INDEX: 44.41 KG/M2 | WEIGHT: 293 LBS

## 2018-12-12 DIAGNOSIS — I35.0 NONRHEUMATIC AORTIC VALVE STENOSIS: ICD-10-CM

## 2018-12-12 DIAGNOSIS — Q23.1 BICUSPID AORTIC VALVE: Primary | ICD-10-CM

## 2018-12-12 DIAGNOSIS — I77.810 ASCENDING AORTA DILATATION (HCC): ICD-10-CM

## 2018-12-12 PROCEDURE — 99213 OFFICE O/P EST LOW 20 MIN: CPT | Performed by: INTERNAL MEDICINE

## 2018-12-12 RX ORDER — AMOXICILLIN 500 MG/1
CAPSULE ORAL
Qty: 4 CAPSULE | Refills: 2 | Status: SHIPPED | OUTPATIENT
Start: 2018-12-12 | End: 2019-05-31 | Stop reason: SDUPTHER

## 2019-01-05 NOTE — PROGRESS NOTES
Date of Office Visit: 2018  Encounter Provider: Pasha Harkins MD  Place of Service: [facility] CARDIOLOGY  Patient Name: Juana Hall  :1960    Chief complaint: Follow-up for bicuspid aortic valve, mild aortic stenosis, mildly   dilated ascending aorta.    History of Present Illness:    I again had the pleasure of seeing the patient in cardiology office on 2018.  She is   a very pleasant 58 year-old white female with a past medical history significant for a   bicuspid aortic valve, aortic stenosis, prior breast cancer, diabetes, and chronic kidney   disease who presents for follow-up.  I initially saw her on 2017.  The patient   underwent an echocardiogram on 10/23/2017 for a systolic murmur.  This showed a   likely bicuspid aortic valve which was heavily calcified.  She had mild aortic stenosis   with a peak gradient of 19 mmHg, and a mean gradient of 11 mmHg.  Her father also   had a ruptured thoracic aortic aneurysm.  Both of her parents had coronary artery   disease, with her mother having a fatal MI at age 72, and her father having a fatal MI at   age 74.  She did undergo a CT scan of the chest without contrast on 2017   (contrast was not used secondary to her advanced kidney disease).  This did not show   any evidence of aneurysm, although her ascending aorta was mildly dilated at 3.8 cm.     The patient presents today for follow-up.  She has been using her CPAP machine   faithfully recently, and states that this has improved her overall feeling.  She still has   lower extremity edema, although it has not changed since last time.  Her renal function   is still abnormal, but is stable.    Past Medical History:   Diagnosis Date   • Anemia    • Anxiety and depression    • Bicuspid aortic valve    • Breast cancer (CMS/HCC)     RIGHT, HAD NEELA. MASTECTOMY, CHEMO AND RADIATION   • CKD (chronic kidney disease)    • Depression    • Diabetes  mellitus (CMS/HCC)    • Foot fracture, right     METATARSAL 3-5, WEARING POST OP SHOE   • Frequent UTI    • Heart murmur    • Hyperlipidemia    • Hypertension    • Hyperthyroidism    • Hypothyroidism    • Kidney stones    • Lymphedema of leg    • MRSA infection     WITH BREAST IMPLANT SURGERY   • Neuromuscular disorder (CMS/HCC)    • Obesity    • ANDREW on CPAP    • Osteoarthrosis    • Pap smear, as part of routine gynecological examination     normal   • Post-menopausal bleeding    • Radiculopathy    • Sepsis (CMS/HCC)     FROM KIDNEY STONE   • Thickened endometrium    • Tremor        Past Surgical History:   Procedure Laterality Date   • BREAST RECONSTRUCTION      WITH IMPLANTS, AND REVISION, NOW REMOVED   • BREAST SURGERY     •  SECTION  1987   •  SECTION  1987   • D&C HYSTEROSCOPY N/A 2017    Procedure: DILATATION AND CURETTAGE, HYSTEROSCOPY ;  Surgeon: Cherie Oliveros MD;  Location: [facility] OR [facility] [place];  Service:    • LYMPH NODE BIOPSY     • MASTECTOMY Bilateral 2003   • MASTECTOMY Bilateral        Current Outpatient Medications on File Prior to Visit   Medication Sig Dispense Refill   • amLODIPine (NORVASC) 10 MG tablet Take 10 mg by mouth Every Morning.     • aspirin 81 MG EC tablet Take 81 mg by mouth Daily     • atorvastatin (LIPITOR) 40 MG tablet Take 1 tablet by mouth Daily. 90 tablet 1   • carvedilol (COREG) 3.125 MG tablet Take 1 tablet by mouth 2 (Two) Times a Day. 60 tablet 11   • Dulaglutide 1.5 MG/0.5ML solution pen-injector Inject 1.5 mg under the skin into the appropriate area as directed 1 (One) Time Per Week.  pen 1   • DULoxetine (CYMBALTA) 60 MG capsule Take 1 capsule by mouth Daily. 90 capsule 1   • gabapentin (NEURONTIN) 300 MG capsule Take 300 mg by mouth 3 (Three) Times a Day.     • glucose blood test strip 1 each by Other route 3 (Three) Times a Day. Use as instructed 100 each 5   • LEVEMIR FLEXTOUCH 100 UNIT/ML injection INJECT 50  UNITS UNDER THE SKIN TWO TIMES A DAY 1 pen 1   • levothyroxine (SYNTHROID, LEVOTHROID) 50 MCG tablet Take 1 tablet by mouth Daily. 90 tablet 1   • repaglinide (PRANDIN) 2 MG tablet Take 1 tablet by mouth 3 (Three) Times a Day Before Meals. 270 tablet 1     No current facility-administered medications on file prior to visit.      Allergies as of 2018   • (No Known Allergies)     Social History     Socioeconomic History   • Marital status:      Spouse name: Not on file   • Number of children: Not on file   • Years of education: Not on file   • Highest education level: Not on file   Social Needs   • Financial resource strain: Not on file   • Food insecurity - worry: Not on file   • Food insecurity - inability: Not on file   • Transportation needs - medical: Not on file   • Transportation needs - non-medical: Not on file   Occupational History   • Not on file   Tobacco Use   • Smoking status: Never Smoker   • Smokeless tobacco: Never Used   Substance and Sexual Activity   • Alcohol use: No   • Drug use: No   • Sexual activity: Yes     Partners: Male     Birth control/protection: Post-menopausal   Other Topics Concern   • Not on file   Social History Narrative   • Not on file     Family History   Problem Relation Age of Onset   • Coronary artery disease Mother         Mother  from MI at 72   • Diabetes Mother    • Breast cancer Mother    • Hyperlipidemia Mother    • Arthritis Mother    • Cancer Mother    • Aortic aneurysm Father         Father with ruptured thoracic aortic aneurysm   • Coronary artery disease Father         Father  from MI at 74   • Heart disease Father    • Hyperlipidemia Father    • Arthritis Father    • Coronary artery disease Maternal Grandmother         MGM deceeased from MI at age 76       Review of Systems   Constitution: Positive for malaise/fatigue.   Cardiovascular: Positive for leg swelling.   Neurological: Positive for light-headedness.   All other systems  "reviewed and are negative.     Objective:     Vitals:    12/12/18 1119   BP: 130/74   Pulse: 74   SpO2: 96%   Weight: (!) 180 kg (397 lb 6.4 oz)   Height: 172.7 cm (67.99\")     Body mass index is 60.44 kg/m².    Physical Exam   Constitutional: She is oriented to person, place, and time. She appears well-developed.   Morbidly obese    HENT:   Head: Normocephalic and atraumatic.   Eyes: Conjunctivae are normal.   Neck: Neck supple.   Cardiovascular: Normal rate and regular rhythm. Exam reveals no gallop and no friction rub.   Murmur heard.   Harsh systolic murmur is present with a grade of 3/6 at the upper right sternal border and upper left sternal border.  Pulmonary/Chest: Effort normal and breath sounds normal.   Abdominal: Soft. There is no tenderness.   Musculoskeletal:   1+ edema of the lower extremities bilaterally   Neurological: She is alert and oriented to person, place, and time.   Skin: Skin is warm.   Psychiatric: She has a normal mood and affect. Her behavior is normal.     Lab Review:   Procedures    Cardiac Procedures:  1. Echocardiogram on 10/23/2017: The ejection fraction was low normal at 50-55%. There   was mild LVH.  There was grade 1 diastolic dysfunction.  The left atrium was mildly dilated.    A bicuspid aortic valve could not be excluded.  The aortic valve leaflets were heavily   calcified.  There was mild aortic stenosis with a peak gradient of 19 mmHg, and a mean   gradient of 11 mmHg.  2. CT scan of the chest without contrast on 12/13/2017: The ascending aorta was mildly   dilated at 3.8 cm.  There was no aortic aneurysm.    Assessment:       Diagnosis Plan   1. Bicuspid aortic valve     2. Nonrheumatic aortic valve stenosis     3. Ascending aorta dilatation (CMS/HCC)       Plan:       The patient seems to be asymptomatic at this point for the most part.  Her weight is a major   issue, and weight loss would help most of her issues in general.  However, this is difficult as   she does have " multiple other comorbidities.  She will need another CT scan next year to   reevaluate the ascending aorta dilatation.  This was only mild and December 2017.  She will   also need another echocardiogram within the next 6 months to a year to reevaluate the   bicuspid aortic valve and aortic stenosis.  She does take antibiotics prior to dental   appointments and dental cleanings.  Her blood pressure has been stable on the amlodipine   and carvedilol.  She has advanced chronic kidney disease, and this must be taken into   consideration for any antihypertensive medications in the future.  She is also taking aspirin   and Lipitor for preventative reasons given her diabetes, chronic kidney disease, and family   history of coronary artery disease.  I will see her back in the office in 6 months.  Again, she   will need an echocardiogram in 6 months to a year, and a CT scan of the chest in one year.

## 2019-01-13 DIAGNOSIS — Z79.4 CONTROLLED TYPE 2 DIABETES MELLITUS WITHOUT COMPLICATION, WITH LONG-TERM CURRENT USE OF INSULIN (HCC): ICD-10-CM

## 2019-01-13 DIAGNOSIS — E11.9 CONTROLLED TYPE 2 DIABETES MELLITUS WITHOUT COMPLICATION, WITH LONG-TERM CURRENT USE OF INSULIN (HCC): ICD-10-CM

## 2019-01-14 RX ORDER — INSULIN DETEMIR 100 [IU]/ML
INJECTION, SOLUTION SUBCUTANEOUS
Qty: 3 PEN | Refills: 0 | Status: SHIPPED | OUTPATIENT
Start: 2019-01-14 | End: 2019-08-27 | Stop reason: SDUPTHER

## 2019-02-01 DIAGNOSIS — Z79.4 CONTROLLED TYPE 2 DIABETES MELLITUS WITHOUT COMPLICATION, WITH LONG-TERM CURRENT USE OF INSULIN (HCC): ICD-10-CM

## 2019-02-01 DIAGNOSIS — E11.9 CONTROLLED TYPE 2 DIABETES MELLITUS WITHOUT COMPLICATION, WITH LONG-TERM CURRENT USE OF INSULIN (HCC): ICD-10-CM

## 2019-02-11 RX ORDER — CARVEDILOL 3.12 MG/1
TABLET ORAL
Qty: 60 TABLET | Refills: 3 | Status: SHIPPED | OUTPATIENT
Start: 2019-02-11 | End: 2019-05-31 | Stop reason: SDUPTHER

## 2019-02-21 DIAGNOSIS — F32.A DEPRESSION, UNSPECIFIED DEPRESSION TYPE: ICD-10-CM

## 2019-02-22 RX ORDER — DULOXETIN HYDROCHLORIDE 60 MG/1
CAPSULE, DELAYED RELEASE ORAL
Qty: 90 CAPSULE | Refills: 0 | OUTPATIENT
Start: 2019-02-22

## 2019-02-25 ENCOUNTER — OFFICE VISIT (OUTPATIENT)
Dept: FAMILY MEDICINE CLINIC | Facility: CLINIC | Age: 59
End: 2019-02-25

## 2019-02-25 VITALS
HEIGHT: 68 IN | SYSTOLIC BLOOD PRESSURE: 110 MMHG | WEIGHT: 293 LBS | OXYGEN SATURATION: 99 % | DIASTOLIC BLOOD PRESSURE: 60 MMHG | HEART RATE: 74 BPM | RESPIRATION RATE: 20 BRPM | BODY MASS INDEX: 44.41 KG/M2 | TEMPERATURE: 98.4 F

## 2019-02-25 DIAGNOSIS — E78.2 MIXED HYPERLIPIDEMIA: Primary | ICD-10-CM

## 2019-02-25 DIAGNOSIS — E11.22 CONTROLLED TYPE 2 DIABETES MELLITUS WITH CHRONIC KIDNEY DISEASE, WITH LONG-TERM CURRENT USE OF INSULIN, UNSPECIFIED CKD STAGE (HCC): ICD-10-CM

## 2019-02-25 DIAGNOSIS — G62.89 OTHER POLYNEUROPATHY: ICD-10-CM

## 2019-02-25 DIAGNOSIS — E11.9 CONTROLLED TYPE 2 DIABETES MELLITUS WITHOUT COMPLICATION, WITH LONG-TERM CURRENT USE OF INSULIN (HCC): ICD-10-CM

## 2019-02-25 DIAGNOSIS — F32.A DEPRESSION, UNSPECIFIED DEPRESSION TYPE: ICD-10-CM

## 2019-02-25 DIAGNOSIS — E03.9 ACQUIRED HYPOTHYROIDISM: ICD-10-CM

## 2019-02-25 DIAGNOSIS — Z79.4 CONTROLLED TYPE 2 DIABETES MELLITUS WITH CHRONIC KIDNEY DISEASE, WITH LONG-TERM CURRENT USE OF INSULIN, UNSPECIFIED CKD STAGE (HCC): ICD-10-CM

## 2019-02-25 DIAGNOSIS — Z79.4 CONTROLLED TYPE 2 DIABETES MELLITUS WITHOUT COMPLICATION, WITH LONG-TERM CURRENT USE OF INSULIN (HCC): ICD-10-CM

## 2019-02-25 PROBLEM — G62.9 PERIPHERAL NEUROPATHY: Status: ACTIVE | Noted: 2019-02-25

## 2019-02-25 PROCEDURE — 99214 OFFICE O/P EST MOD 30 MIN: CPT | Performed by: NURSE PRACTITIONER

## 2019-02-25 RX ORDER — GABAPENTIN 300 MG/1
300 CAPSULE ORAL 4 TIMES DAILY
Qty: 360 CAPSULE | Refills: 1 | Status: SHIPPED | OUTPATIENT
Start: 2019-02-25 | End: 2019-08-27 | Stop reason: SDUPTHER

## 2019-02-25 RX ORDER — DULOXETIN HYDROCHLORIDE 60 MG/1
60 CAPSULE, DELAYED RELEASE ORAL DAILY
Qty: 90 CAPSULE | Refills: 1 | Status: SHIPPED | OUTPATIENT
Start: 2019-02-25 | End: 2019-08-27 | Stop reason: SDUPTHER

## 2019-02-25 RX ORDER — ATORVASTATIN CALCIUM 40 MG/1
40 TABLET, FILM COATED ORAL DAILY
Qty: 90 TABLET | Refills: 1 | Status: SHIPPED | OUTPATIENT
Start: 2019-02-25 | End: 2019-07-18 | Stop reason: SDUPTHER

## 2019-02-25 RX ORDER — LEVOTHYROXINE SODIUM 0.05 MG/1
50 TABLET ORAL DAILY
Qty: 90 TABLET | Refills: 1 | Status: SHIPPED | OUTPATIENT
Start: 2019-02-25 | End: 2019-08-27 | Stop reason: SDUPTHER

## 2019-02-25 RX ORDER — INSULIN DETEMIR 100 [IU]/ML
50 INJECTION, SOLUTION SUBCUTANEOUS 2 TIMES DAILY
Qty: 15 PEN | Refills: 1 | Status: SHIPPED | OUTPATIENT
Start: 2019-02-25 | End: 2019-07-22 | Stop reason: SDUPTHER

## 2019-02-25 RX ORDER — REPAGLINIDE 2 MG/1
2 TABLET ORAL
Qty: 270 TABLET | Refills: 1 | Status: SHIPPED | OUTPATIENT
Start: 2019-02-25 | End: 2019-08-27 | Stop reason: SDUPTHER

## 2019-02-25 NOTE — PROGRESS NOTES
Subjective   Juana Hall is a 58 y.o. female.     History of Present Illness   Juana Hall 58 y.o. female who presents today for routine follow up check and medication refills.  she has a history of   Patient Active Problem List   Diagnosis   • Anxiety   • Depression   • Diabetes type 2, controlled (CMS/HCC)   • Hyperlipidemia   • Heart murmur   • Hypertension   • Post-traumatic osteoarthritis of multiple joints   • Psoriasis   • Radiculopathy   • Acquired hypothyroidism   • Peripheral neuropathy   .  Since the last visit, she has overall felt well.  She has Hypertenision and is well controlled on medication, DMII and is well controlled on medication, Hyperlipidemia and is well controlled on medication, Hypothyroidism and is well controlled on Rx.  Labs are in desired treatment range and peripheral neuropathy that is well controlled on gabapentin.  she has been compliant with current medications have reviewed them.  The patient denies medication side effects.    Results for orders placed or performed in visit on 08/28/18   Comprehensive metabolic panel   Result Value Ref Range    Glucose 101 (H) 65 - 99 mg/dL    BUN 32 (H) 8 - 23 mg/dL    Creatinine 1.81 (H) 0.57 - 1.00 mg/dL    eGFR Non African Am 29 (L) >60 mL/min/1.73    eGFR African Am 35 (L) >60 mL/min/1.73    BUN/Creatinine Ratio 17.7 7.0 - 25.0    Sodium 144 136 - 145 mmol/L    Potassium 5.0 3.5 - 5.2 mmol/L    Chloride 103 98 - 107 mmol/L    Total CO2 28.3 22.0 - 29.0 mmol/L    Calcium 9.1 8.6 - 10.5 mg/dL    Total Protein 7.5 6.0 - 8.5 g/dL    Albumin 3.80 3.50 - 5.20 g/dL    Globulin 3.7 gm/dL    A/G Ratio 1.0 g/dL    Total Bilirubin 0.7 0.1 - 1.2 mg/dL    Alkaline Phosphatase 93 39 - 117 U/L    AST (SGOT) 9 1 - 32 U/L    ALT (SGPT) 9 1 - 33 U/L   Lipid panel   Result Value Ref Range    Total Cholesterol 118 0 - 200 mg/dL    Triglycerides 117 0 - 150 mg/dL    HDL Cholesterol 38 (L) 40 - 60 mg/dL    VLDL Cholesterol 23.4 5 - 40 mg/dL    LDL  Cholesterol  57 0 - 100 mg/dL   TSH   Result Value Ref Range    TSH 3.520 0.270 - 4.200 mIU/mL   Hemoglobin A1c   Result Value Ref Range    Hemoglobin A1C 6.18 (H) 4.80 - 5.60 %   CBC and Differential   Result Value Ref Range    WBC 7.91 4.50 - 10.70 10*3/mm3    RBC 3.99 3.90 - 5.20 10*6/mm3    Hemoglobin 10.7 (L) 11.9 - 15.5 g/dL    Hematocrit 35.7 35.6 - 45.5 %    MCV 89.5 80.5 - 98.2 fL    MCH 26.8 (L) 26.9 - 32.0 pg    MCHC 30.0 (L) 32.4 - 36.3 g/dL    RDW 15.5 (H) 11.7 - 13.0 %    Platelets 300 140 - 500 10*3/mm3    Neutrophil Rel % 65.9 42.7 - 76.0 %    Lymphocyte Rel % 19.7 19.6 - 45.3 %    Monocyte Rel % 9.9 5.0 - 12.0 %    Eosinophil Rel % 4.2 0.3 - 6.2 %    Basophil Rel % 0.3 0.0 - 1.5 %    Neutrophils Absolute 5.22 1.90 - 8.10 10*3/mm3    Lymphocytes Absolute 1.56 0.90 - 4.80 10*3/mm3    Monocytes Absolute 0.78 0.20 - 1.20 10*3/mm3    Eosinophils Absolute 0.33 0.00 - 0.70 10*3/mm3    Basophils Absolute 0.02 0.00 - 0.20 10*3/mm3    Immature Granulocyte Rel % 0.4 0.0 - 0.5 %    Immature Grans Absolute 0.03 0.00 - 0.03 10*3/mm3     Patient is UTD with nephrology.  She is due for f/u in June.     Reports fasting glucose at home has been around 160.     Patient is no longer seeing Dr. Aragon with pain management as she is well controlled on just gabapentin.  She was referred to Sleep Medicine and is currently on CPAP. She reports feeling much improved with this.   The following portions of the patient's history were reviewed and updated as appropriate: allergies, current medications, past family history, past medical history, past social history, past surgical history and problem list.    Review of Systems   Constitutional: Negative for unexpected weight change.   Eyes: Negative for visual disturbance.   Respiratory: Negative for shortness of breath.    Cardiovascular: Negative for chest pain and palpitations.   Endocrine: Negative for cold intolerance, heat intolerance, polydipsia, polyphagia and polyuria.    Neurological: Negative for dizziness and headaches.   Psychiatric/Behavioral: Negative for behavioral problems.       Objective   Physical Exam   Constitutional: She is oriented to person, place, and time. She appears well-developed and well-nourished.   Neck: Carotid bruit is not present.   Cardiovascular: Normal rate and regular rhythm.   Pulmonary/Chest: Effort normal and breath sounds normal.   Neurological: She is alert and oriented to person, place, and time.   Psychiatric: She has a normal mood and affect. Judgment normal.   Nursing note and vitals reviewed.      Assessment/Plan   Juana was seen today for med refill, hyperlipidemia, hypertension and diabetes.    Diagnoses and all orders for this visit:    Mixed hyperlipidemia  -     Comprehensive metabolic panel  -     Lipid panel  -     CBC and Differential  -     TSH  -     Hemoglobin A1c  -     MicroAlbumin, Urine, Random - Urine, Clean Catch  -     T4, free  -     atorvastatin (LIPITOR) 40 MG tablet; Take 1 tablet by mouth Daily.    Controlled type 2 diabetes mellitus with chronic kidney disease, with long-term current use of insulin, unspecified CKD stage (CMS/[facility])  -     Comprehensive metabolic panel  -     Lipid panel  -     CBC and Differential  -     TSH  -     Hemoglobin A1c  -     MicroAlbumin, Urine, Random - Urine, Clean Catch  -     T4, free    Acquired hypothyroidism  -     Comprehensive metabolic panel  -     Lipid panel  -     CBC and Differential  -     TSH  -     Hemoglobin A1c  -     MicroAlbumin, Urine, Random - Urine, Clean Catch  -     T4, free  -     levothyroxine (SYNTHROID, LEVOTHROID) 50 MCG tablet; Take 1 tablet by mouth Daily.    Controlled type 2 diabetes mellitus without complication, with long-term current use of insulin (CMS/[facility])  -     repaglinide (PRANDIN) 2 MG tablet; Take 1 tablet by mouth 3 (Three) Times a Day Before Meals.  -     LEVEMIR FLEXTOUCH 100 UNIT/ML injection; Inject 50 Units under the skin into the  appropriate area as directed 2 (Two) Times a Day.  -     glucose blood test strip; 1 each by Other route 3 (Three) Times a Day. Use as instructed  -     Dulaglutide 1.5 MG/0.5ML solution pen-injector; Inject 1.5 mg under the skin into the appropriate area as directed 1 (One) Time Per Week. MONDAYS    Depression, unspecified depression type  -     DULoxetine (CYMBALTA) 60 MG capsule; Take 1 capsule by mouth Daily.    Other polyneuropathy  -     gabapentin (NEURONTIN) 300 MG capsule; Take 1 capsule by mouth 4 (Four) Times a Day.

## 2019-03-17 LAB
ALBUMIN SERPL-MCNC: 3.8 G/DL (ref 3.6–4.8)
ALBUMIN/GLOB SERPL: 1.2 {RATIO} (ref 1.2–2.2)
ALP SERPL-CCNC: 85 IU/L (ref 39–117)
ALT SERPL-CCNC: 12 IU/L (ref 0–32)
AST SERPL-CCNC: 10 IU/L (ref 0–40)
BASOPHILS # BLD AUTO: 0 X10E3/UL (ref 0–0.2)
BASOPHILS NFR BLD AUTO: 0 %
BILIRUB SERPL-MCNC: 0.6 MG/DL (ref 0–1.2)
BUN SERPL-MCNC: 29 MG/DL (ref 8–27)
BUN/CREAT SERPL: 16 (ref 12–28)
CALCIUM SERPL-MCNC: 9.4 MG/DL (ref 8.7–10.3)
CHLORIDE SERPL-SCNC: 105 MMOL/L (ref 96–106)
CHOLEST SERPL-MCNC: 123 MG/DL (ref 100–199)
CO2 SERPL-SCNC: 23 MMOL/L (ref 20–29)
CREAT SERPL-MCNC: 1.8 MG/DL (ref 0.57–1)
EOSINOPHIL # BLD AUTO: 0.3 X10E3/UL (ref 0–0.4)
EOSINOPHIL NFR BLD AUTO: 4 %
ERYTHROCYTE [DISTWIDTH] IN BLOOD BY AUTOMATED COUNT: 15.1 % (ref 12.3–15.4)
GLOBULIN SER CALC-MCNC: 3.2 G/DL (ref 1.5–4.5)
GLUCOSE SERPL-MCNC: 157 MG/DL (ref 65–99)
HBA1C MFR BLD: 5.9 % (ref 4.8–5.6)
HCT VFR BLD AUTO: 34.1 % (ref 34–46.6)
HDLC SERPL-MCNC: 34 MG/DL
HGB BLD-MCNC: 10.9 G/DL (ref 11.1–15.9)
IMM GRANULOCYTES # BLD AUTO: 0 X10E3/UL (ref 0–0.1)
IMM GRANULOCYTES NFR BLD AUTO: 0 %
LDLC SERPL CALC-MCNC: 61 MG/DL (ref 0–99)
LYMPHOCYTES # BLD AUTO: 1.5 X10E3/UL (ref 0.7–3.1)
LYMPHOCYTES NFR BLD AUTO: 20 %
MCH RBC QN AUTO: 28.7 PG (ref 26.6–33)
MCHC RBC AUTO-ENTMCNC: 32 G/DL (ref 31.5–35.7)
MCV RBC AUTO: 90 FL (ref 79–97)
MICROALBUMIN UR-MCNC: 284.2 UG/ML
MONOCYTES # BLD AUTO: 0.5 X10E3/UL (ref 0.1–0.9)
MONOCYTES NFR BLD AUTO: 6 %
NEUTROPHILS # BLD AUTO: 5.3 X10E3/UL (ref 1.4–7)
NEUTROPHILS NFR BLD AUTO: 70 %
PLATELET # BLD AUTO: 288 X10E3/UL (ref 150–379)
POTASSIUM SERPL-SCNC: 4.9 MMOL/L (ref 3.5–5.2)
PROT SERPL-MCNC: 7 G/DL (ref 6–8.5)
RBC # BLD AUTO: 3.8 X10E6/UL (ref 3.77–5.28)
SODIUM SERPL-SCNC: 142 MMOL/L (ref 134–144)
T4 FREE SERPL-MCNC: 1.12 NG/DL (ref 0.82–1.77)
TRIGL SERPL-MCNC: 142 MG/DL (ref 0–149)
TSH SERPL DL<=0.005 MIU/L-ACNC: 3.31 UIU/ML (ref 0.45–4.5)
VLDLC SERPL CALC-MCNC: 28 MG/DL (ref 5–40)
WBC # BLD AUTO: 7.6 X10E3/UL (ref 3.4–10.8)

## 2019-03-21 DIAGNOSIS — D64.9 ANEMIA, UNSPECIFIED TYPE: Primary | ICD-10-CM

## 2019-05-31 RX ORDER — AMOXICILLIN 500 MG/1
CAPSULE ORAL
Qty: 4 CAPSULE | Refills: 1 | Status: SHIPPED | OUTPATIENT
Start: 2019-05-31 | End: 2019-08-27

## 2019-05-31 RX ORDER — CARVEDILOL 3.12 MG/1
TABLET ORAL
Qty: 60 TABLET | Refills: 2 | Status: SHIPPED | OUTPATIENT
Start: 2019-05-31 | End: 2019-09-03 | Stop reason: SDUPTHER

## 2019-06-27 ENCOUNTER — OFFICE VISIT (OUTPATIENT)
Dept: CARDIOLOGY | Facility: CLINIC | Age: 59
End: 2019-06-27

## 2019-06-27 VITALS
DIASTOLIC BLOOD PRESSURE: 74 MMHG | BODY MASS INDEX: 44.41 KG/M2 | HEIGHT: 68 IN | WEIGHT: 293 LBS | OXYGEN SATURATION: 98 % | HEART RATE: 61 BPM | SYSTOLIC BLOOD PRESSURE: 126 MMHG

## 2019-06-27 DIAGNOSIS — Q23.1 CONGENITAL BICUSPID AORTIC VALVE: Primary | ICD-10-CM

## 2019-06-27 DIAGNOSIS — I35.0 NONRHEUMATIC AORTIC VALVE STENOSIS: ICD-10-CM

## 2019-06-27 DIAGNOSIS — I77.810 ASCENDING AORTA DILATATION (HCC): ICD-10-CM

## 2019-06-27 PROCEDURE — 93000 ELECTROCARDIOGRAM COMPLETE: CPT | Performed by: INTERNAL MEDICINE

## 2019-06-27 PROCEDURE — 99214 OFFICE O/P EST MOD 30 MIN: CPT | Performed by: INTERNAL MEDICINE

## 2019-06-27 RX ORDER — ERGOCALCIFEROL 1.25 MG/1
50000 CAPSULE ORAL
COMMUNITY
Start: 2019-06-05 | End: 2022-12-08 | Stop reason: SDDI

## 2019-06-29 NOTE — PROGRESS NOTES
Date of Office Visit: 2019  Encounter Provider: Pasha Harkins MD  Place of Service: [facility] CARDIOLOGY  Patient Name: Juana Hall  :1960    Chief complaint: Follow-up for bicuspid aortic valve, mild aortic stenosis, mildly   dilated ascending aorta.    History of Present Illness:    I again had the pleasure of seeing the patient in cardiology office on 2019.  She is   a very pleasant 58 year-old white female with a past medical history significant for a   bicuspid aortic valve, aortic stenosis, prior breast cancer, diabetes, and chronic kidney   disease who presents for follow-up.  I initially saw her on 2017.  The patient   underwent an echocardiogram on 10/23/2017 for a systolic murmur.  This showed a   likely bicuspid aortic valve which was heavily calcified.  She had mild aortic stenosis   with a peak gradient of 19 mmHg, and a mean gradient of 11 mmHg.  Her father also   had a ruptured thoracic aortic aneurysm.  Both of her parents had coronary artery   disease, with her mother having a fatal MI at age 72, and her father having a fatal MI at   age 74.  She did undergo a CT scan of the chest without contrast on 2017   (contrast was not used secondary to her advanced kidney disease).  This did not show   any evidence of aneurysm, although her ascending aorta was mildly dilated at 3.8 cm.     The patient presents today for follow-up.  She is actually doing well.  She has had no   chest pain or shortness of breath.  She really has no significant complaints today other   than mild intermittent lower extremity edema.  She did tell me that her recent GFR was  approximately 33.    Past Medical History:   Diagnosis Date   • Anemia    • Anxiety and depression    • Bicuspid aortic valve    • Breast cancer (CMS/HCC)     RIGHT, HAD NEELA. MASTECTOMY, CHEMO AND RADIATION   • CKD (chronic kidney disease)    • Depression    • Diabetes mellitus (CMS/HCC)     • Foot fracture, right     METATARSAL 3-5, WEARING POST OP SHOE   • Frequent UTI    • Heart murmur    • Hyperlipidemia    • Hypertension    • Hyperthyroidism    • Hypothyroidism    • Kidney stones    • Lymphedema of leg    • MRSA infection     WITH BREAST IMPLANT SURGERY   • Neuromuscular disorder (CMS/HCC)    • Obesity    • ANDREW on CPAP    • Osteoarthrosis    • Pap smear, as part of routine gynecological examination     normal   • Post-menopausal bleeding    • Radiculopathy    • Sepsis (CMS/HCC)     FROM KIDNEY STONE   • Thickened endometrium    • Tremor        Past Surgical History:   Procedure Laterality Date   • BREAST RECONSTRUCTION      WITH IMPLANTS, AND REVISION, NOW REMOVED   • BREAST SURGERY     •  SECTION  1987   •  SECTION  1987   • D&C HYSTEROSCOPY N/A 2017    Procedure: DILATATION AND CURETTAGE, HYSTEROSCOPY ;  Surgeon: Cherie Oliveros MD;  Location: [facility] OR [facility];  Service:    • LYMPH NODE BIOPSY     • MASTECTOMY Bilateral 2003   • MASTECTOMY Bilateral        Current Outpatient Medications on File Prior to Visit   Medication Sig Dispense Refill   • amLODIPine (NORVASC) 10 MG tablet Take 10 mg by mouth Every Morning.     • aspirin 81 MG EC tablet Take 81 mg by mouth Daily     • atorvastatin (LIPITOR) 40 MG tablet Take 1 tablet by mouth Daily. 90 tablet 1   • Calcium Carbonate-Vitamin D 500-50 MG-UNIT capsule Take  by mouth Daily.     • carvedilol (COREG) 3.125 MG tablet TAKE ONE TABLET BY MOUTH TWICE A DAY 60 tablet 2   • Dulaglutide 1.5 MG/0.5ML solution pen-injector Inject 1.5 mg under the skin into the appropriate area as directed 1 (One) Time Per Week.  pen 1   • DULoxetine (CYMBALTA) 60 MG capsule Take 1 capsule by mouth Daily. 90 capsule 1   • gabapentin (NEURONTIN) 300 MG capsule Take 1 capsule by mouth 4 (Four) Times a Day. 360 capsule 1   • glucose blood test strip 1 each by Other route 3 (Three) Times a Day. Use as  instructed 100 each 5   • LEVEMIR FLEXTOUCH 100 UNIT/ML injection INJECT 50 UNITS UNDER THE SKIN TWO TIMES A DAY 3 pen 0   • LEVEMIR FLEXTOUCH 100 UNIT/ML injection Inject 50 Units under the skin into the appropriate area as directed 2 (Two) Times a Day. 15 pen 1   • levothyroxine (SYNTHROID, LEVOTHROID) 50 MCG tablet Take 1 tablet by mouth Daily. 90 tablet 1   • repaglinide (PRANDIN) 2 MG tablet Take 1 tablet by mouth 3 (Three) Times a Day Before Meals. 270 tablet 1   • vitamin D (ERGOCALCIFEROL) 39448 units capsule capsule      • amoxicillin (AMOXIL) 500 MG capsule TAKE 4 CAPSULES BY MOUTH 1 HOUR PRIOR TO DENTAL APPOINTMENT 4 capsule 1     No current facility-administered medications on file prior to visit.      Allergies as of 2019   • (No Known Allergies)     Social History     Socioeconomic History   • Marital status:      Spouse name: Not on file   • Number of children: Not on file   • Years of education: Not on file   • Highest education level: Not on file   Tobacco Use   • Smoking status: Never Smoker   • Smokeless tobacco: Never Used   Substance and Sexual Activity   • Alcohol use: No   • Drug use: No   • Sexual activity: Yes     Partners: Male     Birth control/protection: Post-menopausal     Family History   Problem Relation Age of Onset   • Coronary artery disease Mother         Mother  from MI at 72   • Diabetes Mother    • Breast cancer Mother    • Hyperlipidemia Mother    • Arthritis Mother    • Cancer Mother    • Aortic aneurysm Father         Father with ruptured thoracic aortic aneurysm   • Coronary artery disease Father         Father  from MI at 74   • Heart disease Father    • Hyperlipidemia Father    • Arthritis Father    • Coronary artery disease Maternal Grandmother         MGM deceeased from MI at age 76       Review of Systems   All other systems reviewed and are negative.     Objective:     Vitals:    19 1118   BP: 126/74   BP Location: Right arm  "  Patient Position: Sitting   Cuff Size: Adult   Pulse: 61   SpO2: 98%   Weight: (!) 171 kg (376 lb 6.4 oz)   Height: 172.7 cm (68\")     Body mass index is 57.23 kg/m².    Physical Exam   Constitutional: She is oriented to person, place, and time. She appears well-developed.   Obese   HENT:   Head: Normocephalic and atraumatic.   Eyes: Conjunctivae are normal.   Neck: Neck supple.   Cardiovascular: Normal rate and regular rhythm. Exam reveals no gallop and no friction rub.   Murmur heard.   Systolic murmur is present with a grade of 3/6 at the upper right sternal border and upper left sternal border.  Pulmonary/Chest: Effort normal and breath sounds normal.   Abdominal: Soft. There is no tenderness.   Musculoskeletal:   Trace to 1+ edema of the lower extremities bilaterally    Neurological: She is alert and oriented to person, place, and time.   Skin: Skin is warm.   Psychiatric: She has a normal mood and affect. Her behavior is normal.     Lab Review:     ECG 12 Lead  Date/Time: 6/27/2019 11:16 AM  Performed by: Pasha Harkins MD  Authorized by: Pasha Harkins MD   Comparison: compared with previous ECG from 5/19/2017  Comparison to previous ECG: Compared to the prior EKG, first-degree AV block is now present  Rhythm: sinus rhythm  Rate: normal  BPM: 63  Conduction: 1st degree AV block  Other findings: poor R wave progression    Clinical impression: abnormal EKG          Cardiac Procedures:  1. Echocardiogram on 10/23/2017: The ejection fraction was low normal at 50-55%. There   was mild LVH.  There was grade 1 diastolic dysfunction.  The left atrium was mildly dilated.    A bicuspid aortic valve could not be excluded.  The aortic valve leaflets were heavily   calcified.  There was mild aortic stenosis with a peak gradient of 19 mmHg, and a mean   gradient of 11 mmHg.  2. CT scan of the chest without contrast on 12/13/2017: The ascending aorta was mildly   dilated at 3.8 cm.  There was no aortic " aneurysm.       Assessment:       Diagnosis Plan   1. Congenital bicuspid aortic valve  CT Chest Without Contrast    Adult Transthoracic Echo Complete W/ Cont if Necessary Per Protocol   2. Nonrheumatic aortic valve stenosis  Adult Transthoracic Echo Complete W/ Cont if Necessary Per Protocol   3. Ascending aorta dilatation (CMS/HCC)  CT Chest Without Contrast     Plan:       She seems to be from stable other than intermittent lower extremity edema.  She has had no chest pain or shortness of breath.  Her murmur does sound slightly more prominent today.  She is due for an echocardiogram at this point to reevaluate the bicuspid aortic valve and aortic stenosis.  She also needs a reassessment of her thoracic aorta at this point, especially given the association of bicuspid aortic valves with aortic aneurysms (plus her dilated ascending aorta in the past).  She has advanced kidney disease, and cannot take contrast dye.  Therefore, I am ordering the CT scan without contrast.  This will have to make do for now as I do not want to jeopardize her kidneys.  Her recent GFR was 33.      She does take 10 mg of amlodipine every day, and this very likely contributes to the edema.  However, her blood pressure has been controlled with this, and I am not going to change it.  She will continue on the amlodipine plus the carvedilol.  She does take antibiotics prior to dental appointments.  I will have her follow-up in 1 year, and further plans will be made pending the results the above testing.

## 2019-07-12 ENCOUNTER — HOSPITAL ENCOUNTER (OUTPATIENT)
Dept: CT IMAGING | Facility: HOSPITAL | Age: 59
Discharge: HOME | End: 2019-07-12

## 2019-07-12 ENCOUNTER — HOSPITAL ENCOUNTER (OUTPATIENT)
Dept: CARDIOLOGY | Facility: HOSPITAL | Age: 59
Discharge: HOME | End: 2019-07-12
Admitting: INTERNAL MEDICINE

## 2019-07-12 VITALS
HEART RATE: 81 BPM | WEIGHT: 293 LBS | BODY MASS INDEX: 44.41 KG/M2 | DIASTOLIC BLOOD PRESSURE: 70 MMHG | HEIGHT: 68 IN | SYSTOLIC BLOOD PRESSURE: 130 MMHG | OXYGEN SATURATION: 100 %

## 2019-07-12 DIAGNOSIS — I35.0 NONRHEUMATIC AORTIC VALVE STENOSIS: ICD-10-CM

## 2019-07-12 DIAGNOSIS — Q23.1 CONGENITAL BICUSPID AORTIC VALVE: ICD-10-CM

## 2019-07-12 DIAGNOSIS — I77.810 ASCENDING AORTA DILATATION (HCC): ICD-10-CM

## 2019-07-12 PROCEDURE — 93306 TTE W/DOPPLER COMPLETE: CPT | Performed by: INTERNAL MEDICINE

## 2019-07-12 PROCEDURE — 93306 TTE W/DOPPLER COMPLETE: CPT

## 2019-07-12 PROCEDURE — 71250 CT THORAX DX C-: CPT

## 2019-07-12 PROCEDURE — 25010000002 PERFLUTREN (DEFINITY) 8.476 MG IN SODIUM CHLORIDE 0.9 % 10 ML INJECTION: Performed by: INTERNAL MEDICINE

## 2019-07-12 RX ADMIN — PERFLUTREN 1.5 ML: 6.52 INJECTION, SUSPENSION INTRAVENOUS at 10:33

## 2019-07-15 DIAGNOSIS — C76.2 ABDOMINAL CARCINOMATOSIS (HCC): ICD-10-CM

## 2019-07-15 DIAGNOSIS — R93.89 ABNORMAL CT OF THE CHEST: Primary | ICD-10-CM

## 2019-07-15 LAB
AORTIC ARCH: 2.99 CM
AORTIC ROOT ANNULUS: 1.9 CM
ASCENDING AORTA: 3.2 CM
BH CV ECHO MEAS - ACS: 1.7 CM
BH CV ECHO MEAS - AO MAX PG (FULL): 22.8 MMHG
BH CV ECHO MEAS - AO MAX PG: 28 MMHG
BH CV ECHO MEAS - AO MEAN PG (FULL): 13.7 MMHG
BH CV ECHO MEAS - AO MEAN PG: 17 MMHG
BH CV ECHO MEAS - AO V2 MAX: 262.2 CM/SEC
BH CV ECHO MEAS - AO V2 MEAN: 194.8 CM/SEC
BH CV ECHO MEAS - AO V2 VTI: 72 CM
BH CV ECHO MEAS - ASC AORTA: 3.2 CM
BH CV ECHO MEAS - AVA(I,A): 1.1 CM^2
BH CV ECHO MEAS - AVA(I,D): 1.1 CM^2
BH CV ECHO MEAS - AVA(V,A): 1.1 CM^2
BH CV ECHO MEAS - AVA(V,D): 1.1 CM^2
BH CV ECHO MEAS - BSA(HAYCOCK): 3 M^2
BH CV ECHO MEAS - BSA: 2.7 M^2
BH CV ECHO MEAS - BZI_BMI: 57.2 KILOGRAMS/M^2
BH CV ECHO MEAS - BZI_METRIC_HEIGHT: 172.7 CM
BH CV ECHO MEAS - BZI_METRIC_WEIGHT: 170.6 KG
BH CV ECHO MEAS - EDV(MOD-SP2): 213 ML
BH CV ECHO MEAS - EDV(MOD-SP4): 193 ML
BH CV ECHO MEAS - EDV(TEICH): 197.5 ML
BH CV ECHO MEAS - EF(CUBED): 72.7 %
BH CV ECHO MEAS - EF(MOD-BP): 48 %
BH CV ECHO MEAS - EF(MOD-SP2): 42.7 %
BH CV ECHO MEAS - EF(MOD-SP4): 50.3 %
BH CV ECHO MEAS - EF(TEICH): 63.4 %
BH CV ECHO MEAS - ESV(MOD-SP2): 122 ML
BH CV ECHO MEAS - ESV(MOD-SP4): 96 ML
BH CV ECHO MEAS - ESV(TEICH): 72.2 ML
BH CV ECHO MEAS - FS: 35.1 %
BH CV ECHO MEAS - IVS/LVPW: 1
BH CV ECHO MEAS - IVSD: 1.2 CM
BH CV ECHO MEAS - LAT PEAK E' VEL: 5 CM/SEC
BH CV ECHO MEAS - LV DIASTOLIC VOL/BSA (35-75): 72.2 ML/M^2
BH CV ECHO MEAS - LV MASS(C)D: 350.6 GRAMS
BH CV ECHO MEAS - LV MASS(C)DI: 131.2 GRAMS/M^2
BH CV ECHO MEAS - LV MAX PG: 4.7 MMHG
BH CV ECHO MEAS - LV MEAN PG: 3.2 MMHG
BH CV ECHO MEAS - LV SYSTOLIC VOL/BSA (12-30): 35.9 ML/M^2
BH CV ECHO MEAS - LV V1 MAX: 108.5 CM/SEC
BH CV ECHO MEAS - LV V1 MEAN: 86 CM/SEC
BH CV ECHO MEAS - LV V1 VTI: 27.9 CM
BH CV ECHO MEAS - LVIDD: 6.2 CM
BH CV ECHO MEAS - LVIDS: 4.1 CM
BH CV ECHO MEAS - LVLD AP2: 8.7 CM
BH CV ECHO MEAS - LVLD AP4: 9.5 CM
BH CV ECHO MEAS - LVLS AP2: 8.3 CM
BH CV ECHO MEAS - LVLS AP4: 7.7 CM
BH CV ECHO MEAS - LVOT AREA (M): 2.8 CM^2
BH CV ECHO MEAS - LVOT AREA: 2.8 CM^2
BH CV ECHO MEAS - LVOT DIAM: 1.9 CM
BH CV ECHO MEAS - LVPWD: 1.2 CM
BH CV ECHO MEAS - MED PEAK E' VEL: 5 CM/SEC
BH CV ECHO MEAS - MV A DUR: 0.11 SEC
BH CV ECHO MEAS - MV A MAX VEL: 114.3 CM/SEC
BH CV ECHO MEAS - MV DEC SLOPE: 634.2 CM/SEC^2
BH CV ECHO MEAS - MV DEC TIME: 0.19 SEC
BH CV ECHO MEAS - MV E MAX VEL: 131 CM/SEC
BH CV ECHO MEAS - MV E/A: 1.1
BH CV ECHO MEAS - MV MAX PG: 7.8 MMHG
BH CV ECHO MEAS - MV MEAN PG: 3.9 MMHG
BH CV ECHO MEAS - MV P1/2T MAX VEL: 141.2 CM/SEC
BH CV ECHO MEAS - MV P1/2T: 65.2 MSEC
BH CV ECHO MEAS - MV V2 MAX: 139.8 CM/SEC
BH CV ECHO MEAS - MV V2 MEAN: 94.4 CM/SEC
BH CV ECHO MEAS - MV V2 VTI: 51.2 CM
BH CV ECHO MEAS - MVA P1/2T LCG: 1.6 CM^2
BH CV ECHO MEAS - MVA(P1/2T): 3.4 CM^2
BH CV ECHO MEAS - MVA(VTI): 1.5 CM^2
BH CV ECHO MEAS - PA ACC TIME: 0.08 SEC
BH CV ECHO MEAS - PA MAX PG (FULL): 5.3 MMHG
BH CV ECHO MEAS - PA MAX PG: 8.6 MMHG
BH CV ECHO MEAS - PA PR(ACCEL): 42.6 MMHG
BH CV ECHO MEAS - PA V2 MAX: 146.6 CM/SEC
BH CV ECHO MEAS - PULM A REVS DUR: 0.09 SEC
BH CV ECHO MEAS - PULM A REVS VEL: 25.7 CM/SEC
BH CV ECHO MEAS - PULM DIAS VEL: 47.1 CM/SEC
BH CV ECHO MEAS - PULM S/D: 1.2
BH CV ECHO MEAS - PULM SYS VEL: 55.8 CM/SEC
BH CV ECHO MEAS - PVA(V,A): 2.8 CM^2
BH CV ECHO MEAS - PVA(V,D): 2.8 CM^2
BH CV ECHO MEAS - QP/QS: 1.4
BH CV ECHO MEAS - RAP SYSTOLE: 3 MMHG
BH CV ECHO MEAS - RV MAX PG: 3.3 MMHG
BH CV ECHO MEAS - RV MEAN PG: 2.2 MMHG
BH CV ECHO MEAS - RV V1 MAX: 90.3 CM/SEC
BH CV ECHO MEAS - RV V1 MEAN: 70.1 CM/SEC
BH CV ECHO MEAS - RV V1 VTI: 23.7 CM
BH CV ECHO MEAS - RVOT AREA: 4.6 CM^2
BH CV ECHO MEAS - RVOT DIAM: 2.4 CM
BH CV ECHO MEAS - RVSP: 20 MMHG
BH CV ECHO MEAS - SI(CUBED): 66.4 ML/M^2
BH CV ECHO MEAS - SI(LVOT): 28.7 ML/M^2
BH CV ECHO MEAS - SI(MOD-SP2): 34 ML/M^2
BH CV ECHO MEAS - SI(MOD-SP4): 36.3 ML/M^2
BH CV ECHO MEAS - SI(TEICH): 46.9 ML/M^2
BH CV ECHO MEAS - SUP REN AO DIAM: 2 CM
BH CV ECHO MEAS - SV(CUBED): 177.4 ML
BH CV ECHO MEAS - SV(LVOT): 76.6 ML
BH CV ECHO MEAS - SV(MOD-SP2): 91 ML
BH CV ECHO MEAS - SV(MOD-SP4): 97 ML
BH CV ECHO MEAS - SV(RVOT): 109 ML
BH CV ECHO MEAS - SV(TEICH): 125.3 ML
BH CV ECHO MEAS - TAPSE (>1.6): 2.9 CM2
BH CV ECHO MEAS - TR MAX VEL: 208.1 CM/SEC
BH CV ECHO MEASUREMENTS AVERAGE E/E' RATIO: 26.2
BH CV XLRA - RV BASE: 3.5 CM
BH CV XLRA - TDI S': 15 CM/SEC
LEFT ATRIUM VOLUME INDEX: 34 ML/M2
MAXIMAL PREDICTED HEART RATE: 160 BPM
SINUS: 2.3 CM
STJ: 2.8 CM
STRESS TARGET HR: 136 BPM

## 2019-07-15 NOTE — PROGRESS NOTES
I called and gave the patient the results.  The aortic valve is slightly worse with the aortic stenosis, although not severe.  She did have grade 2 diastolic dysfunction noted, although she has not had a change in her symptoms.

## 2019-07-15 NOTE — PROGRESS NOTES
I called and spoke to the patient on the phone.  Her ascending aorta is very mildly enlarged.  However, she is very concerned about the findings of potential abdominal carcinomatosis with the fluid in her abdomen.  I explained to her that this is not a definitive diagnosis, but does need further work-up.  With her history of breast cancer, she was very anxious.  I have ordered a stat CT scan of the abdomen and pelvis at this point to assess this further.  I will call her soon as his results are available.    GILLIAN

## 2019-07-16 ENCOUNTER — HOSPITAL ENCOUNTER (OUTPATIENT)
Dept: CT IMAGING | Facility: HOSPITAL | Age: 59
Discharge: HOME | End: 2019-07-16
Admitting: INTERNAL MEDICINE

## 2019-07-16 DIAGNOSIS — R93.89 ABNORMAL CT OF THE CHEST: ICD-10-CM

## 2019-07-16 DIAGNOSIS — C76.2 ABDOMINAL CARCINOMATOSIS (HCC): ICD-10-CM

## 2019-07-16 PROCEDURE — 74176 CT ABD & PELVIS W/O CONTRAST: CPT

## 2019-07-17 DIAGNOSIS — C80.0 CARCINOMATOSIS (HCC): Primary | ICD-10-CM

## 2019-07-17 NOTE — PROGRESS NOTES
Called and spoke to the patient on the phone about the results.  I advised her that while not definitive, this needs further evaluation.  I spoke with Dr. Irvin from the [facility].  He is graciously agreed to take over the work-up of this, and he is going to arrange an appointment soon.  I called the patient back and left a message notifying her of this.    Krish Harkins

## 2019-07-18 DIAGNOSIS — E78.2 MIXED HYPERLIPIDEMIA: ICD-10-CM

## 2019-07-18 RX ORDER — ATORVASTATIN CALCIUM 40 MG/1
40 TABLET, FILM COATED ORAL DAILY
Qty: 90 TABLET | Refills: 0 | Status: SHIPPED | OUTPATIENT
Start: 2019-07-18 | End: 2019-08-27 | Stop reason: SDUPTHER

## 2019-07-22 ENCOUNTER — CONSULT (OUTPATIENT)
Dept: ONCOLOGY | Facility: CLINIC | Age: 59
End: 2019-07-22

## 2019-07-22 ENCOUNTER — LAB (OUTPATIENT)
Dept: OTHER | Facility: HOSPITAL | Age: 59
End: 2019-07-22

## 2019-07-22 VITALS
BODY MASS INDEX: 44.41 KG/M2 | OXYGEN SATURATION: 98 % | WEIGHT: 293 LBS | RESPIRATION RATE: 18 BRPM | DIASTOLIC BLOOD PRESSURE: 65 MMHG | SYSTOLIC BLOOD PRESSURE: 132 MMHG | HEIGHT: 68 IN | TEMPERATURE: 97.9 F | HEART RATE: 68 BPM

## 2019-07-22 DIAGNOSIS — K66.8 OMENTAL MASS: ICD-10-CM

## 2019-07-22 DIAGNOSIS — D64.9 NORMOCYTIC ANEMIA: Primary | ICD-10-CM

## 2019-07-22 DIAGNOSIS — R89.9 ABNORMAL LABORATORY TEST: Primary | ICD-10-CM

## 2019-07-22 DIAGNOSIS — Z85.3 HISTORY OF RIGHT BREAST CANCER: ICD-10-CM

## 2019-07-22 LAB
ALBUMIN SERPL-MCNC: 3.6 G/DL (ref 3.5–5.2)
ALBUMIN/GLOB SERPL: 0.9 G/DL
ALP SERPL-CCNC: 89 U/L (ref 39–117)
ALT SERPL W P-5'-P-CCNC: 20 U/L (ref 1–33)
ANION GAP SERPL CALCULATED.3IONS-SCNC: 13.9 MMOL/L (ref 5–15)
AST SERPL-CCNC: 15 U/L (ref 1–32)
BASOPHILS # BLD AUTO: 0.03 10*3/MM3 (ref 0–0.2)
BASOPHILS NFR BLD AUTO: 0.5 % (ref 0–1.5)
BILIRUB SERPL-MCNC: 0.5 MG/DL (ref 0.2–1.2)
BUN BLD-MCNC: 37 MG/DL (ref 8–23)
BUN/CREAT SERPL: 18.5 (ref 7–25)
CALCIUM SPEC-SCNC: 8.9 MG/DL (ref 8.6–10.5)
CANCER AG15-3 SERPL-ACNC: 32.6 U/ML
CHLORIDE SERPL-SCNC: 102 MMOL/L (ref 98–107)
CO2 SERPL-SCNC: 24.1 MMOL/L (ref 22–29)
CREAT BLD-MCNC: 2 MG/DL (ref 0.57–1)
DEPRECATED RDW RBC AUTO: 45.1 FL (ref 37–54)
EOSINOPHIL # BLD AUTO: 0.29 10*3/MM3 (ref 0–0.4)
EOSINOPHIL NFR BLD AUTO: 4.9 % (ref 0.3–6.2)
ERYTHROCYTE [DISTWIDTH] IN BLOOD BY AUTOMATED COUNT: 14.1 % (ref 12.3–15.4)
FERRITIN SERPL-MCNC: 85.2 NG/ML (ref 13–150)
GFR SERPL CREATININE-BSD FRML MDRD: 25 ML/MIN/1.73
GLOBULIN UR ELPH-MCNC: 4.1 GM/DL
GLUCOSE BLD-MCNC: 148 MG/DL (ref 65–99)
HCT VFR BLD AUTO: 31.6 % (ref 34–46.6)
HGB BLD-MCNC: 9.9 G/DL (ref 12–15.9)
IMM GRANULOCYTES # BLD AUTO: 0.03 10*3/MM3 (ref 0–0.05)
IMM GRANULOCYTES NFR BLD AUTO: 0.5 % (ref 0–0.5)
IRON 24H UR-MRATE: 30 MCG/DL (ref 37–145)
IRON SATN MFR SERPL: 10 % (ref 20–50)
LDH SERPL-CCNC: 192 U/L (ref 135–214)
LYMPHOCYTES # BLD AUTO: 1.69 10*3/MM3 (ref 0.7–3.1)
LYMPHOCYTES NFR BLD AUTO: 28.5 % (ref 19.6–45.3)
MCH RBC QN AUTO: 27.6 PG (ref 26.6–33)
MCHC RBC AUTO-ENTMCNC: 31.3 G/DL (ref 31.5–35.7)
MCV RBC AUTO: 88 FL (ref 79–97)
MONOCYTES # BLD AUTO: 0.7 10*3/MM3 (ref 0.1–0.9)
MONOCYTES NFR BLD AUTO: 11.8 % (ref 5–12)
NEUTROPHILS # BLD AUTO: 3.18 10*3/MM3 (ref 1.7–7)
NEUTROPHILS NFR BLD AUTO: 53.8 % (ref 42.7–76)
NRBC BLD AUTO-RTO: 0 /100 WBC (ref 0–0.2)
PLATELET # BLD AUTO: 219 10*3/MM3 (ref 140–450)
PMV BLD AUTO: 10.9 FL (ref 6–12)
POTASSIUM BLD-SCNC: 4.8 MMOL/L (ref 3.5–5.2)
PROT SERPL-MCNC: 7.7 G/DL (ref 6–8.5)
RBC # BLD AUTO: 3.59 10*6/MM3 (ref 3.77–5.28)
SODIUM BLD-SCNC: 140 MMOL/L (ref 136–145)
TIBC SERPL-MCNC: 311 MCG/DL (ref 298–536)
TRANSFERRIN SERPL-MCNC: 209 MG/DL (ref 200–360)
VIT B12 BLD-MCNC: 370 PG/ML (ref 211–946)
WBC NRBC COR # BLD: 5.92 10*3/MM3 (ref 3.4–10.8)

## 2019-07-22 PROCEDURE — 82728 ASSAY OF FERRITIN: CPT | Performed by: INTERNAL MEDICINE

## 2019-07-22 PROCEDURE — 99245 OFF/OP CONSLTJ NEW/EST HI 55: CPT | Performed by: INTERNAL MEDICINE

## 2019-07-22 PROCEDURE — 83540 ASSAY OF IRON: CPT | Performed by: INTERNAL MEDICINE

## 2019-07-22 PROCEDURE — 86300 IMMUNOASSAY TUMOR CA 15-3: CPT | Performed by: INTERNAL MEDICINE

## 2019-07-22 PROCEDURE — 82607 VITAMIN B-12: CPT | Performed by: INTERNAL MEDICINE

## 2019-07-22 PROCEDURE — 83615 LACTATE (LD) (LDH) ENZYME: CPT | Performed by: INTERNAL MEDICINE

## 2019-07-22 PROCEDURE — 85025 COMPLETE CBC W/AUTO DIFF WBC: CPT | Performed by: INTERNAL MEDICINE

## 2019-07-22 PROCEDURE — 84466 ASSAY OF TRANSFERRIN: CPT | Performed by: INTERNAL MEDICINE

## 2019-07-22 PROCEDURE — 82668 ASSAY OF ERYTHROPOIETIN: CPT | Performed by: INTERNAL MEDICINE

## 2019-07-22 PROCEDURE — 36415 COLL VENOUS BLD VENIPUNCTURE: CPT

## 2019-07-22 PROCEDURE — 80053 COMPREHEN METABOLIC PANEL: CPT | Performed by: INTERNAL MEDICINE

## 2019-07-22 NOTE — PROGRESS NOTES
.     REASON FOR CONSULTATION: Possible carcinomatosis seen on recent CT scans with history of lobular breast cancer.  Provide an opinion on any further workup or treatment                             REQUESTING PHYSICIAN: Pasha Harkins MD    RECORDS OBTAINED:  Records of the patients history including those obtained from the referring provider were reviewed and summarized in detail.    HISTORY OF PRESENT ILLNESS:  The patient is a 58 y.o. year old female who is here for an initial visit with the above-mentioned history.  She has a prior history of breast cancer on the right as follows:  History of stage IIIC right breast cancer ([id number]):  · Patient treated previously in the [facility]  · Diagnosis via excisional biopsy 8/23/2003 with lobular carcinoma, 4.5 cm, positive margins.  ER/ND positive, HER-2/tj negative.  · Staging evaluation in September 2003 with negative bone scan and negative CT scans.  Ejection fraction 65%.  · Received neoadjuvant chemotherapy (? GET regimen) which apparently included Taxotere and possibly epirubicin.  Received 6 cycles.  · 1/22/2004 bilateral mastectomy with right axillary dissection.  Per available records, 10-12 cm residual invasive lobular carcinoma in the breast with 14/16 lymph nodes positive with extranodal extension.  Immediate reconstruction.  · Received adjuvant chemotherapy with carboplatin and navelbine x4 cycles  · Initiated adjuvant tamoxifen 6/22/2004  · Adjuvant radiation therapy initiated but interrupted due to chest wall cellulitis requiring removal of implants in August 2004  · Implant reconstruction again attempted with MRSA infection, implant removed 12/30/2005 with no further attempts made and reconstruction.  · Completed 5 years tamoxifen in June 2009 and subsequently transitioned to Femara.  Received Femara until June 2014.  · Patient experienced postmenopausal vaginal bleeding in 2013, negative endometrial biopsy and gynecologic  evaluation.  · Patient last seen in [facility] 6/18/2014  · CT chest performed to evaluate bicuspid aortic valve and dilated asending aorta 7/12/2019.  CT showed no change in aorta however showed new free intraperitoneal fluid in the upper abdomen with mesenteric edema, concerning for carcinomatosis.  · CT abdomen and pelvis (without IV contrast) on 7/16/2019 with mesenteric thickening, small volume of complex fluid in the mesentery which was suspicious and possibly representative of carcinomatosis, small amount of perihepatic fluid, small bowel loops tethered to omentum without obstruction, omental thickening, shotty retroperitoneal and pelvic lymph nodes (non-pathologically enlarged).     Due to the findings on CT scan, the patient was referred to our office for additional evaluation.  She is asymptomatic currently, denies any abdominal pain or bloating, denies any nausea or vomiting.  She reports a normal appetite.  She has lost 35 pounds in the past 3-1/2 months however this has been intentional weight loss on weight watchers.  She reports normal bowel function.  She does have some minor dyspnea on exertion related to her obesity however this is stable.  It does appear that she also has some degree of chronic anemia with a hemoglobin in the 10-11 range and normal MCV.  She also has chronic kidney disease stage III with a baseline creatinine in the 1.5-1.8 range more recently.  She has underlying diabetes with most recent hemoglobin A1c 5.9 on 3/16/2019.  She does report some chronic neuropathy symptoms in her left upper and lower extremities that occurred during her hospitalization for MRSA sepsis at the time of her implant removal.  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  Patient also has underlying hypothyroidism continuing on Synthroid 50 mcg daily as well as a history of depression continuing on Cymbalta 60 mg daily.      Past Medical History:   Diagnosis Date   • Anemia     • Anxiety and depression    • Bicuspid aortic valve    • Breast cancer (CMS/HCC)     RIGHT, HAD NEELA. MASTECTOMY, CHEMO AND RADIATION   • CKD (chronic kidney disease)    • Depression    • Diabetes mellitus (CMS/HCC)    • Foot fracture, right     METATARSAL 3-5, WEARING POST OP SHOE   • Frequent UTI    • Heart murmur    • Hyperlipidemia    • Hypertension    • Hyperthyroidism    • Hypothyroidism    • Kidney stones    • Lymphedema of leg    • MRSA infection     WITH BREAST IMPLANT SURGERY   • Neuromuscular disorder (CMS/HCC)    • Obesity    • ANDREW on CPAP    • Osteoarthrosis    • Pap smear, as part of routine gynecological examination     normal   • Post-menopausal bleeding    • Radiculopathy    • Sepsis (CMS/HCC)     FROM KIDNEY STONE   • Thickened endometrium    • Tremor      Past Surgical History:   Procedure Laterality Date   • BREAST RECONSTRUCTION      WITH IMPLANTS, AND REVISION, NOW REMOVED   • BREAST SURGERY     •  SECTION  1987   •  SECTION  1987   • D&C HYSTEROSCOPY N/A 2017    Procedure: DILATATION AND CURETTAGE, HYSTEROSCOPY ;  Surgeon: Cherie Oliveros MD;  Location: [facility] OR [facility] [place];  Service:    • LYMPH NODE BIOPSY     • MASTECTOMY Bilateral 2003   • MASTECTOMY Bilateral          HEMATOLOGIC/ONCOLOGIC HISTORY:  (History from previous dates can be found in the separate document.)    MEDICATIONS    Current Outpatient Medications:   •  amLODIPine (NORVASC) 10 MG tablet, Take 10 mg by mouth Every Morning., Disp: , Rfl:   •  aspirin 81 MG EC tablet, Take 81 mg by mouth Daily, Disp: , Rfl:   •  atorvastatin (LIPITOR) 40 MG tablet, Take 1 tablet by mouth Daily., Disp: 90 tablet, Rfl: 0  •  Calcium Carbonate-Vitamin D 500-50 MG-UNIT capsule, Take  by mouth Daily., Disp: , Rfl:   •  carvedilol (COREG) 3.125 MG tablet, TAKE ONE TABLET BY MOUTH TWICE A DAY, Disp: 60 tablet, Rfl: 2  •  Dulaglutide 1.5 MG/0.5ML solution pen-injector, Inject 1.5 mg  under the skin into the appropriate area as directed 1 (One) Time Per Week. , Disp: 12 pen, Rfl: 1  •  DULoxetine (CYMBALTA) 60 MG capsule, Take 1 capsule by mouth Daily., Disp: 90 capsule, Rfl: 1  •  gabapentin (NEURONTIN) 300 MG capsule, Take 1 capsule by mouth 4 (Four) Times a Day., Disp: 360 capsule, Rfl: 1  •  glucose blood test strip, 1 each by Other route 3 (Three) Times a Day. Use as instructed, Disp: 100 each, Rfl: 5  •  LEVEMIR FLEXTOUCH 100 UNIT/ML injection, INJECT 50 UNITS UNDER THE SKIN TWO TIMES A DAY, Disp: 3 pen, Rfl: 0  •  levothyroxine (SYNTHROID, LEVOTHROID) 50 MCG tablet, Take 1 tablet by mouth Daily., Disp: 90 tablet, Rfl: 1  •  repaglinide (PRANDIN) 2 MG tablet, Take 1 tablet by mouth 3 (Three) Times a Day Before Meals., Disp: 270 tablet, Rfl: 1  •  vitamin D (ERGOCALCIFEROL) 32048 units capsule capsule, , Disp: , Rfl:   •  amoxicillin (AMOXIL) 500 MG capsule, TAKE 4 CAPSULES BY MOUTH 1 HOUR PRIOR TO DENTAL APPOINTMENT, Disp: 4 capsule, Rfl: 1    ALLERGIES:   No Known Allergies    SOCIAL HISTORY:       Social History     Socioeconomic History   • Marital status:      Spouse name: Not on file   • Number of children: Not on file   • Years of education: Not on file   • Highest education level: Not on file   Tobacco Use   • Smoking status: Never Smoker   • Smokeless tobacco: Never Used   Substance and Sexual Activity   • Alcohol use: No   • Drug use: No   • Sexual activity: Yes     Partners: Male     Birth control/protection: Post-menopausal         FAMILY HISTORY:  Family History   Problem Relation Age of Onset   • Coronary artery disease Mother         Mother  from MI at 72   • Diabetes Mother    • Breast cancer Mother    • Hyperlipidemia Mother    • Arthritis Mother    • Cancer Mother    • Aortic aneurysm Father         Father with ruptured thoracic aortic aneurysm   • Coronary artery disease Father         Father  from MI at 74   • Heart disease Father    •  "Hyperlipidemia Father    • Arthritis Father    • Coronary artery disease Maternal Grandmother         STEPHANI deceeased from MI at age 76       REVIEW OF SYSTEMS:  A comprehensive review of systems was performed and was negative except as mentioned above in the HPI.         Vitals:    07/22/19 1547   BP: 132/65   Pulse: 68   Resp: 18   Temp: 97.9 °F (36.6 °C)   TempSrc: Oral   SpO2: 98%   Weight: (!) 168 kg (370 lb 11.2 oz)   Height: 172.7 cm (67.99\")   PainSc: 0-No pain     Current Status 7/22/2019   ECOG score 0       Physical Exam   Constitutional: She is oriented to person, place, and time. She appears well-developed and well-nourished.   HENT:   Mouth/Throat: Oropharynx is clear and moist.   Eyes: Conjunctivae are normal.   Neck: No thyromegaly present.   Cardiovascular: Normal rate and regular rhythm. Exam reveals no gallop and no friction rub.   No murmur heard.  Pulmonary/Chest: Breath sounds normal. No respiratory distress.   Status post bilateral mastectomies.  Radiation telangiectasias noted on the right.  Postoperative and posttreatment change without evidence of mass or abnormality.   Abdominal: Soft. Bowel sounds are normal. She exhibits no distension. There is no tenderness.   Musculoskeletal: She exhibits edema.   1+ bilateral lower extremity edema.  There are some stasis changes noted on the left   Lymphadenopathy:        Head (right side): No submandibular adenopathy present.     She has no cervical adenopathy.     She has no axillary adenopathy.        Right: No inguinal and no supraclavicular adenopathy present.        Left: No inguinal and no supraclavicular adenopathy present.   Neurological: She is alert and oriented to person, place, and time. She displays normal reflexes. No cranial nerve deficit. She exhibits normal muscle tone.   Skin: Skin is warm and dry. No rash noted.   Psychiatric: She has a normal mood and affect. Her behavior is normal.       RECENT LABS:        WBC   Date Value Ref " Range Status   07/22/2019 5.92 3.40 - 10.80 10*3/mm3 Final   03/16/2019 7.6 3.4 - 10.8 x10E3/uL Final     Hemoglobin   Date Value Ref Range Status   07/22/2019 9.9 (L) 12.0 - 15.9 g/dL Final     Platelets   Date Value Ref Range Status   07/22/2019 219 140 - 450 10*3/mm3 Final     I did review multiple studies as outlined in the history present illness above in the assessment below.  I did personally review CT images today from 7/16/2019.      Assessment/Plan      1. History of stage IIIC right breast cancer ([id number]):  · Patient treated previously in the [facility]  · Diagnosis via excisional biopsy 8/23/2003 with lobular carcinoma, 4.5 cm, positive margins.  ER/HI positive, HER-2/tj negative.  · Staging evaluation in September 2003 with negative bone scan and negative CT scans.  Ejection fraction 65%.  · Received neoadjuvant chemotherapy (? GET regimen) which apparently included Taxotere and possibly epirubicin.  Received 6 cycles.  · 1/22/2004 bilateral mastectomy with right axillary dissection.  Per available records, 10-12 cm residual invasive lobular carcinoma in the breast with 14/16 lymph nodes positive with extranodal extension.  Immediate reconstruction.  · Received adjuvant chemotherapy with carboplatin and navelbine x4 cycles  · Initiated adjuvant tamoxifen 6/22/2004  · Adjuvant radiation therapy initiated but interrupted due to chest wall cellulitis requiring removal of implants in August 2004  · Implant reconstruction again attempted with MRSA infection, implant removed 12/30/2005 with no further attempts made and reconstruction.  · Completed 5 years tamoxifen in June 2009 and subsequently transitioned to Femara.  Received Femara until June 2014.  · Patient experienced postmenopausal vaginal bleeding in 2013, negative endometrial biopsy and gynecologic evaluation.  · Patient last seen in [facility] 6/18/2014  · CT chest performed to evaluate bicuspid aortic valve and dilated asending aorta  7/12/2019.  CT showed no change in aorta however showed new free intraperitoneal fluid in the upper abdomen with mesenteric edema, concerning for carcinomatosis.  · CT abdomen and pelvis (without IV contrast) on 7/16/2019 with mesenteric thickening, small volume of complex fluid in the mesentery which was suspicious and possibly representative of carcinomatosis, small amount of perihepatic fluid, small bowel loops tethered to omentum without obstruction, omental thickening, shotty retroperitoneal and pelvic lymph nodes (non-pathologically enlarged).  · The patient is here today for initial consultation regarding her recent CT findings.  She is transferring care from the [facility] where she was seen previously.  The changes on the CT are subtle although very concerning for possible carcinomatosis.  We discussed that lobular breast cancer certainly can have a tendency to recur late and along serosal surfaces.  We discussed evaluating the situation further with PET scan although this may not be revealing given the potential indolent nature of her disease.  We are going to also schedule a CT-guided omental biopsy as there was notation of omental thickening on the CT scan.  If we are able to obtain tissue, we will request ER/GA and HER-2/tj testing.  We will obtain a CA 15-3 with her labs today.  We did discuss the possible diagnosis of metastatic breast cancer and the potential need to pursue palliative treatment.  We will discuss that further when she returns if indeed we prove recurrent disease on biopsy.  Initial treatment would likely involve aromatase inhibitor with Ibrance.  I will see her back in 2 weeks to review the above results.  2. Normocytic anemia in the setting of CKD3:  · The patient appears to have a chronic anemia with hemoglobin in the 10-11 range with normal MCV.  · Patient has underlying CKD3 with baseline creatinine recently in the 1.5-1.8 range.  · Labs today with hemoglobin 9.9, MCV 88  · We  will send off additional evaluation with iron panel, ferritin, B12, folate.  3. CKD3:  · As above, baseline creatinine recently and 1.5-1.8 range  · Creatinine pending today  4. CHF with mild to moderate aortic stenosis:  · Recent cardiology evaluation with echocardiogram 7/12/2019 showing ejection fraction 48% with moderate dilation of the LV cavity, global hypokinesis, grade 2 diastolic dysfunction, mild to moderate aortic stenosis, trivial pericardial effusion.  · CT chest 7/12/2019 with no change in the aorta compared to prior study 12/13/2017.  5. Left upper and lower extremity peripheral neuropathy:  · Patient reports that this was not present from previous chemotherapy but occurred after hospitalization that required intubation and critical care stay.  Apparently felt to represent critical care neuropathy.  Improved over time.  · May have future implications regarding treatment for breast cancer.    Plan:  1. Additional labs today with CMP, LDH, CA 15-3, iron panel, ferritin, B12, folate, erythropoietin level  2. PET scan this week  3. Following PET scan we will schedule patient for CT-guided omental biopsy.  Specimen to be sent for ER, MT, and HER-2/tj.  4. MD visit in 2 weeks with CBC, CMP to review the above results.  If recurrent breast cancer identified, we will discuss treatment options at that time.

## 2019-07-24 LAB — ETHNIC BACKGROUND STATED: 20.4 MIU/ML (ref 2.6–18.5)

## 2019-07-26 ENCOUNTER — HOSPITAL ENCOUNTER (OUTPATIENT)
Dept: PET IMAGING | Facility: HOSPITAL | Age: 59
Discharge: HOME | End: 2019-07-26
Admitting: INTERNAL MEDICINE

## 2019-07-26 ENCOUNTER — HOSPITAL ENCOUNTER (OUTPATIENT)
Dept: PET IMAGING | Facility: HOSPITAL | Age: 59
Discharge: HOME | End: 2019-07-26

## 2019-07-26 DIAGNOSIS — D64.9 NORMOCYTIC ANEMIA: ICD-10-CM

## 2019-07-26 DIAGNOSIS — Z85.3 HISTORY OF RIGHT BREAST CANCER: ICD-10-CM

## 2019-07-26 DIAGNOSIS — K66.8 OMENTAL MASS: ICD-10-CM

## 2019-07-26 LAB — GLUCOSE BLDC GLUCOMTR-MCNC: 136 MG/DL (ref 70–130)

## 2019-07-26 PROCEDURE — 82962 GLUCOSE BLOOD TEST: CPT

## 2019-07-26 PROCEDURE — 78815 PET IMAGE W/CT SKULL-THIGH: CPT

## 2019-07-26 PROCEDURE — 0 FLUDEOXYGLUCOSE F18 SOLUTION: Performed by: INTERNAL MEDICINE

## 2019-07-26 PROCEDURE — A9552 F18 FDG: HCPCS | Performed by: INTERNAL MEDICINE

## 2019-07-26 RX ADMIN — FLUDEOXYGLUCOSE F18 1 DOSE: 300 INJECTION INTRAVENOUS at 09:22

## 2019-07-30 ENCOUNTER — HOSPITAL ENCOUNTER (OUTPATIENT)
Dept: CT IMAGING | Facility: HOSPITAL | Age: 59
Discharge: HOME | End: 2019-07-30
Admitting: RADIOLOGY

## 2019-07-30 VITALS
WEIGHT: 293 LBS | HEART RATE: 65 BPM | DIASTOLIC BLOOD PRESSURE: 65 MMHG | HEIGHT: 67 IN | OXYGEN SATURATION: 94 % | SYSTOLIC BLOOD PRESSURE: 151 MMHG | BODY MASS INDEX: 45.99 KG/M2 | TEMPERATURE: 97 F | RESPIRATION RATE: 16 BRPM

## 2019-07-30 DIAGNOSIS — K66.8 OMENTAL MASS: Primary | ICD-10-CM

## 2019-07-30 DIAGNOSIS — Z85.3 HISTORY OF RIGHT BREAST CANCER: ICD-10-CM

## 2019-07-30 LAB
INR PPP: 1 (ref 0.8–1.2)
PROTHROMBIN TIME: 12.3 SECONDS (ref 12.8–15.2)

## 2019-07-30 PROCEDURE — 88305 TISSUE EXAM BY PATHOLOGIST: CPT | Performed by: INTERNAL MEDICINE

## 2019-07-30 PROCEDURE — 25010000003 LIDOCAINE 1 % SOLUTION: Performed by: RADIOLOGY

## 2019-07-30 PROCEDURE — 77012 CT SCAN FOR NEEDLE BIOPSY: CPT

## 2019-07-30 PROCEDURE — 85610 PROTHROMBIN TIME: CPT

## 2019-07-30 PROCEDURE — 88360 TUMOR IMMUNOHISTOCHEM/MANUAL: CPT | Performed by: INTERNAL MEDICINE

## 2019-07-30 RX ORDER — SODIUM CHLORIDE 0.9 % (FLUSH) 0.9 %
1-10 SYRINGE (ML) INJECTION AS NEEDED
Status: DISCONTINUED | OUTPATIENT
Start: 2019-07-30 | End: 2019-07-31 | Stop reason: HOSPADM

## 2019-07-30 RX ORDER — SODIUM CHLORIDE 0.9 % (FLUSH) 0.9 %
3 SYRINGE (ML) INJECTION EVERY 12 HOURS SCHEDULED
Status: DISCONTINUED | OUTPATIENT
Start: 2019-07-30 | End: 2019-07-31 | Stop reason: HOSPADM

## 2019-07-30 RX ORDER — LIDOCAINE HYDROCHLORIDE 10 MG/ML
20 INJECTION, SOLUTION INFILTRATION; PERINEURAL ONCE
Status: COMPLETED | OUTPATIENT
Start: 2019-07-30 | End: 2019-07-30

## 2019-07-30 RX ADMIN — LIDOCAINE HYDROCHLORIDE 20 ML: 10 INJECTION, SOLUTION INFILTRATION; PERINEURAL at 13:06

## 2019-07-30 RX ADMIN — Medication 3 ML: at 11:55

## 2019-07-31 ENCOUNTER — TELEPHONE (OUTPATIENT)
Dept: INTERVENTIONAL RADIOLOGY/VASCULAR | Facility: HOSPITAL | Age: 59
End: 2019-07-31

## 2019-07-31 ENCOUNTER — TELEPHONE (OUTPATIENT)
Dept: ONCOLOGY | Facility: HOSPITAL | Age: 59
End: 2019-07-31

## 2019-07-31 NOTE — TELEPHONE ENCOUNTER
Dr. Irvin requested I call this patient to discuss possible bowel perforation symptoms.  Called pt.  States she is doing great.  No symptoms at all.

## 2019-08-07 DIAGNOSIS — G62.89 OTHER POLYNEUROPATHY: ICD-10-CM

## 2019-08-07 RX ORDER — GABAPENTIN 300 MG/1
CAPSULE ORAL
Qty: 360 CAPSULE | Refills: 0 | OUTPATIENT
Start: 2019-08-07

## 2019-08-12 ENCOUNTER — OFFICE VISIT (OUTPATIENT)
Dept: ONCOLOGY | Facility: CLINIC | Age: 59
End: 2019-08-12

## 2019-08-12 ENCOUNTER — LAB (OUTPATIENT)
Dept: LAB | Facility: HOSPITAL | Age: 59
End: 2019-08-12

## 2019-08-12 VITALS
WEIGHT: 293 LBS | DIASTOLIC BLOOD PRESSURE: 70 MMHG | HEART RATE: 100 BPM | HEIGHT: 67 IN | TEMPERATURE: 98.3 F | SYSTOLIC BLOOD PRESSURE: 166 MMHG | RESPIRATION RATE: 16 BRPM | OXYGEN SATURATION: 97 % | BODY MASS INDEX: 45.99 KG/M2

## 2019-08-12 DIAGNOSIS — D64.9 NORMOCYTIC ANEMIA: ICD-10-CM

## 2019-08-12 DIAGNOSIS — C50.919 METASTATIC BREAST CANCER: Primary | ICD-10-CM

## 2019-08-12 DIAGNOSIS — Z85.3 HISTORY OF RIGHT BREAST CANCER: ICD-10-CM

## 2019-08-12 DIAGNOSIS — K66.8 OMENTAL MASS: ICD-10-CM

## 2019-08-12 LAB
ALBUMIN SERPL-MCNC: 3.6 G/DL (ref 3.5–5.2)
ALBUMIN/GLOB SERPL: 0.9 G/DL (ref 1.1–2.4)
ALP SERPL-CCNC: 90 U/L (ref 38–116)
ALT SERPL W P-5'-P-CCNC: 34 U/L (ref 0–33)
ANION GAP SERPL CALCULATED.3IONS-SCNC: 11.7 MMOL/L (ref 5–15)
AST SERPL-CCNC: 28 U/L (ref 0–32)
BASOPHILS # BLD AUTO: 0.04 10*3/MM3 (ref 0–0.2)
BASOPHILS NFR BLD AUTO: 0.5 % (ref 0–1.5)
BILIRUB SERPL-MCNC: 0.6 MG/DL (ref 0.2–1.2)
BUN BLD-MCNC: 32 MG/DL (ref 6–20)
BUN/CREAT SERPL: 17.8 (ref 7.3–30)
CALCIUM SPEC-SCNC: 8.7 MG/DL (ref 8.5–10.2)
CHLORIDE SERPL-SCNC: 103 MMOL/L (ref 98–107)
CO2 SERPL-SCNC: 26.3 MMOL/L (ref 22–29)
CREAT BLD-MCNC: 1.8 MG/DL (ref 0.6–1.1)
DEPRECATED RDW RBC AUTO: 48.6 FL (ref 37–54)
EOSINOPHIL # BLD AUTO: 0.46 10*3/MM3 (ref 0–0.4)
EOSINOPHIL NFR BLD AUTO: 6.1 % (ref 0.3–6.2)
ERYTHROCYTE [DISTWIDTH] IN BLOOD BY AUTOMATED COUNT: 14.3 % (ref 12.3–15.4)
FOLATE SERPL-MCNC: 3.84 NG/ML (ref 4.78–24.2)
GFR SERPL CREATININE-BSD FRML MDRD: 29 ML/MIN/1.73
GLOBULIN UR ELPH-MCNC: 3.9 GM/DL (ref 1.8–3.5)
GLUCOSE BLD-MCNC: 163 MG/DL (ref 74–124)
HCT VFR BLD AUTO: 34.9 % (ref 34–46.6)
HGB BLD-MCNC: 10.2 G/DL (ref 12–15.9)
IMM GRANULOCYTES # BLD AUTO: 0.07 10*3/MM3 (ref 0–0.05)
IMM GRANULOCYTES NFR BLD AUTO: 0.9 % (ref 0–0.5)
LYMPHOCYTES # BLD AUTO: 1.7 10*3/MM3 (ref 0.7–3.1)
LYMPHOCYTES NFR BLD AUTO: 22.4 % (ref 19.6–45.3)
MCH RBC QN AUTO: 27.5 PG (ref 26.6–33)
MCHC RBC AUTO-ENTMCNC: 29.2 G/DL (ref 31.5–35.7)
MCV RBC AUTO: 94.1 FL (ref 79–97)
MONOCYTES # BLD AUTO: 0.62 10*3/MM3 (ref 0.1–0.9)
MONOCYTES NFR BLD AUTO: 8.2 % (ref 5–12)
NEUTROPHILS # BLD AUTO: 4.69 10*3/MM3 (ref 1.7–7)
NEUTROPHILS NFR BLD AUTO: 61.9 % (ref 42.7–76)
NRBC BLD AUTO-RTO: 0 /100 WBC (ref 0–0.2)
PLATELET # BLD AUTO: 269 10*3/MM3 (ref 140–450)
PMV BLD AUTO: 9.5 FL (ref 6–12)
POTASSIUM BLD-SCNC: 5.3 MMOL/L (ref 3.5–4.7)
PROT SERPL-MCNC: 7.5 G/DL (ref 6.3–8)
RBC # BLD AUTO: 3.71 10*6/MM3 (ref 3.77–5.28)
SODIUM BLD-SCNC: 141 MMOL/L (ref 134–145)
WBC NRBC COR # BLD: 7.58 10*3/MM3 (ref 3.4–10.8)

## 2019-08-12 PROCEDURE — 99215 OFFICE O/P EST HI 40 MIN: CPT | Performed by: INTERNAL MEDICINE

## 2019-08-12 PROCEDURE — 36415 COLL VENOUS BLD VENIPUNCTURE: CPT

## 2019-08-12 PROCEDURE — 80053 COMPREHEN METABOLIC PANEL: CPT

## 2019-08-12 PROCEDURE — 85025 COMPLETE CBC W/AUTO DIFF WBC: CPT

## 2019-08-12 PROCEDURE — 82746 ASSAY OF FOLIC ACID SERUM: CPT | Performed by: INTERNAL MEDICINE

## 2019-08-12 RX ORDER — EXEMESTANE 25 MG/1
25 TABLET ORAL DAILY
Qty: 30 TABLET | Refills: 4 | Status: SHIPPED | OUTPATIENT
Start: 2019-08-12 | End: 2020-01-13

## 2019-08-12 RX ORDER — AMLODIPINE BESYLATE 5 MG/1
TABLET ORAL
COMMUNITY
Start: 2019-08-08 | End: 2019-08-27 | Stop reason: ALTCHOICE

## 2019-08-13 ENCOUNTER — TELEPHONE (OUTPATIENT)
Dept: ONCOLOGY | Facility: CLINIC | Age: 59
End: 2019-08-13

## 2019-08-13 ENCOUNTER — DOCUMENTATION (OUTPATIENT)
Dept: ONCOLOGY | Facility: CLINIC | Age: 59
End: 2019-08-13

## 2019-08-13 ENCOUNTER — TELEPHONE (OUTPATIENT)
Dept: ONCOLOGY | Facility: HOSPITAL | Age: 59
End: 2019-08-13

## 2019-08-13 ENCOUNTER — APPOINTMENT (OUTPATIENT)
Dept: LAB | Facility: HOSPITAL | Age: 59
End: 2019-08-13

## 2019-08-13 ENCOUNTER — OFFICE VISIT (OUTPATIENT)
Dept: ONCOLOGY | Facility: CLINIC | Age: 59
End: 2019-08-13

## 2019-08-13 DIAGNOSIS — C50.919 METASTATIC BREAST CANCER: Primary | ICD-10-CM

## 2019-08-13 PROCEDURE — G0463 HOSPITAL OUTPT CLINIC VISIT: HCPCS | Performed by: NURSE PRACTITIONER

## 2019-08-13 PROCEDURE — 99215 OFFICE O/P EST HI 40 MIN: CPT | Performed by: NURSE PRACTITIONER

## 2019-08-13 RX ORDER — ONDANSETRON HYDROCHLORIDE 8 MG/1
8 TABLET, FILM COATED ORAL EVERY 8 HOURS PRN
Qty: 30 TABLET | Refills: 2 | Status: SHIPPED | OUTPATIENT
Start: 2019-08-13 | End: 2020-05-15 | Stop reason: SDUPTHER

## 2019-08-13 RX ORDER — FOLIC ACID 1 MG/1
1 TABLET ORAL DAILY
Qty: 30 TABLET | Refills: 3 | Status: SHIPPED | OUTPATIENT
Start: 2019-08-13 | End: 2019-12-08 | Stop reason: SDUPTHER

## 2019-08-13 NOTE — TELEPHONE ENCOUNTER
I advised the pt that her folate was a little low and Dr. Irvin wants her to take 1mg of oral folic acid daily, the prescription has already been called into her pharmacy. The [facility] on [address]. Pt v/u.

## 2019-08-13 NOTE — TELEPHONE ENCOUNTER
----- Message from Leodan Irvin Jr., MD sent at 8/12/2019 11:52 PM EDT -----  Please contact patient and asked to begin oral folic acid 1 mg daily and please send in prescription as well. Thanks!  ----- Message -----  From: Lab, Background User  Sent: 8/12/2019   5:08 PM  To: Leodan Irvin Jr., MD

## 2019-08-13 NOTE — PROGRESS NOTES
Subjective .     REASONS FOR FOLLOWUP:    1. Metastatic lobular breast cancer (ER/SD positive, HER-2/tj negative):  · History of stage IIIC right breast cancer ([id number])  · Patient treated previously in the [facility]  · Diagnosis via excisional biopsy 8/23/2003 with lobular carcinoma, 4.5 cm, positive margins.  ER/SD positive, HER-2/tj negative.  · Staging evaluation in September 2003 with negative bone scan and negative CT scans.  Ejection fraction 65%.  · Received neoadjuvant chemotherapy (? GET regimen) which apparently included Taxotere and possibly epirubicin.  Received 6 cycles.  · 1/22/2004 bilateral mastectomy with right axillary dissection.  Per available records, 10-12 cm residual invasive lobular carcinoma in the breast with 14/16 lymph nodes positive with extranodal extension.  Immediate reconstruction.  · Received adjuvant chemotherapy with carboplatin and navelbine x4 cycles  · Initiated adjuvant tamoxifen 6/22/2004  · Adjuvant radiation therapy initiated but interrupted due to chest wall cellulitis requiring removal of implants in August 2004  · Implant reconstruction again attempted with MRSA infection, implant removed 12/30/2005 with no further attempts made and reconstruction.  · Completed 5 years tamoxifen in June 2009 and subsequently transitioned to Femara.  Received Femara until June 2014.  · Patient experienced postmenopausal vaginal bleeding in 2013, negative endometrial biopsy and gynecologic evaluation.  · Patient last seen in [facility] 6/18/2014  · CT chest performed to evaluate bicuspid aortic valve and dilated asending aorta 7/12/2019.  CT showed no change in aorta however showed new free intraperitoneal fluid in the upper abdomen with mesenteric edema, concerning for carcinomatosis.  · CT abdomen and pelvis (without IV contrast) on 7/16/2019 with mesenteric thickening, small volume of complex fluid in the mesentery which was suspicious and possibly representative of  carcinomatosis, small amount of perihepatic fluid, small bowel loops tethered to omentum without obstruction, omental thickening, shotty retroperitoneal and pelvic lymph nodes (non-pathologically enlarged).  · PET scan 7/26/2019 with hypermetabolic omental activity, hypermetabolic small retroperitoneal lymph nodes, hypermetabolic activity throughout uterus with increase in size.  · CT-guided omental biopsy 7/30/2019 with metastatic lobular carcinoma of breast primary, ER positive (greater than 95%), MS positive (90%), HER-2/tj negative (1+ IHC)  · Baseline CA 15-3 on 7/22/19 was 32.6  · Initiation of Aromasin 25 mg daily 8/12/2019.  Plans to begin Ibrance at 100 mg 21/28 days on 8/26/2019.  2. Anemia:  · Normocytic anemia, hemoglobin in 10-11 range  · Anemia evaluation 7/22/2019 with iron 30, ferritin 85.2, iron saturation 10%, TIBC 311, B12 370, erythropoietin level 20.4  · Anemia felt in large part to be related to CKD3 as well as to underlying malignancy  · Evidence of folate deficiency 8/12/2019 with level 3.84, initiated folic acid 1 mg daily  · If hemoglobin consistently below 10 consider use of Procrit      HISTORY OF PRESENT ILLNESS:  The patient is a 58 y.o. year old female who is here for follow-up with the above-mentioned history.    History of Present Illness   patient returns today in follow-up from her initial consultation with multiple laboratory studies, PET scan, and CT-guided omental biopsy to review.  In the interval she has had no significant complaints.  She tolerated the biopsy without difficulty.  She denies any abdominal pain.  She reports normal bowel function.  She reports a stable appetite, denies any weight loss.  She has some minimal fatigue.  She has chronic peripheral neuropathy symptoms involving her lower extremities related to diabetes and some chronic left upper extremity neuropathy related to previous critical care illness.    Past Medical History:   Diagnosis Date   • Anemia     • Anxiety and depression    • Bicuspid aortic valve    • Breast cancer (CMS/HCC)     RIGHT, HAD NEELA. MASTECTOMY, CHEMO AND RADIATION   • CKD (chronic kidney disease)    • Depression    • Diabetes mellitus (CMS/HCC)    • Foot fracture, right     METATARSAL 3-5, WEARING POST OP SHOE   • Frequent UTI    • Heart murmur    • Hyperlipidemia    • Hypertension    • Hyperthyroidism    • Hypothyroidism    • Kidney stones    • Lymphedema of leg    • MRSA infection     WITH BREAST IMPLANT SURGERY   • Neuromuscular disorder (CMS/HCC)    • Obesity    • ANDREW on CPAP    • Osteoarthrosis    • Pap smear, as part of routine gynecological examination     normal   • Post-menopausal bleeding    • Radiculopathy    • Sepsis (CMS/HCC)     FROM KIDNEY STONE   • Thickened endometrium    • Tremor      Past Surgical History:   Procedure Laterality Date   • BREAST RECONSTRUCTION      WITH IMPLANTS, AND REVISION, NOW REMOVED   • BREAST SURGERY     •  SECTION  1987   •  SECTION  1987   • D&C HYSTEROSCOPY N/A 2017    Procedure: DILATATION AND CURETTAGE, HYSTEROSCOPY ;  Surgeon: Cherie Oliveros MD;  Location: [facility] OR [facility] [place];  Service:    • LYMPH NODE BIOPSY     • MASTECTOMY Bilateral 2003   • MASTECTOMY Bilateral          ONCOLOGIC HISTORY:  (History from previous dates can be found in the separate document.)    Current Outpatient Medications on File Prior to Visit   Medication Sig Dispense Refill   • amLODIPine (NORVASC) 10 MG tablet Take 10 mg by mouth Every Morning.     • amLODIPine (NORVASC) 5 MG tablet      • amoxicillin (AMOXIL) 500 MG capsule TAKE 4 CAPSULES BY MOUTH 1 HOUR PRIOR TO DENTAL APPOINTMENT 4 capsule 1   • aspirin 81 MG EC tablet Take 81 mg by mouth Daily     • atorvastatin (LIPITOR) 40 MG tablet Take 1 tablet by mouth Daily. 90 tablet 0   • carvedilol (COREG) 3.125 MG tablet TAKE ONE TABLET BY MOUTH TWICE A DAY 60 tablet 2   • Dulaglutide 1.5 MG/0.5ML solution  pen-injector Inject 1.5 mg under the skin into the appropriate area as directed 1 (One) Time Per Week.  12 pen 1   • DULoxetine (CYMBALTA) 60 MG capsule Take 1 capsule by mouth Daily. 90 capsule 1   • gabapentin (NEURONTIN) 300 MG capsule Take 1 capsule by mouth 4 (Four) Times a Day. 360 capsule 1   • glucose blood test strip 1 each by Other route 3 (Three) Times a Day. Use as instructed 100 each 5   • LEVEMIR FLEXTOUCH 100 UNIT/ML injection INJECT 50 UNITS UNDER THE SKIN TWO TIMES A DAY 3 pen 0   • levothyroxine (SYNTHROID, LEVOTHROID) 50 MCG tablet Take 1 tablet by mouth Daily. 90 tablet 1   • repaglinide (PRANDIN) 2 MG tablet Take 1 tablet by mouth 3 (Three) Times a Day Before Meals. 270 tablet 1   • vitamin D (ERGOCALCIFEROL) 18038 units capsule capsule        No current facility-administered medications on file prior to visit.        ALLERGIES:   No Known Allergies    Social History     Socioeconomic History   • Marital status:      Spouse name: Not on file   • Number of children: Not on file   • Years of education: Not on file   • Highest education level: Not on file   Tobacco Use   • Smoking status: Never Smoker   • Smokeless tobacco: Never Used   Substance and Sexual Activity   • Alcohol use: No   • Drug use: No   • Sexual activity: Yes     Partners: Male     Birth control/protection: Post-menopausal     Family History   Problem Relation Age of Onset   • Coronary artery disease Mother         Mother  from MI at 72   • Diabetes Mother    • Breast cancer Mother    • Hyperlipidemia Mother    • Arthritis Mother    • Cancer Mother    • Aortic aneurysm Father         Father with ruptured thoracic aortic aneurysm   • Coronary artery disease Father         Father  from MI at 74   • Heart disease Father    • Hyperlipidemia Father    • Arthritis Father    • Coronary artery disease Maternal Grandmother         MGM deceeased from MI at age 76              Review of Systems   Constitutional:  Positive for fatigue. Negative for activity change, appetite change, fever and unexpected weight change.   HENT: Negative for congestion, mouth sores, nosebleeds, sore throat and voice change.    Respiratory: Negative for cough, shortness of breath and wheezing.    Cardiovascular: Negative for chest pain, palpitations and leg swelling.   Gastrointestinal: Negative for abdominal distention, abdominal pain, blood in stool, constipation, diarrhea, nausea and vomiting.   Endocrine: Negative for cold intolerance and heat intolerance.   Genitourinary: Negative for difficulty urinating, dysuria, frequency and hematuria.   Musculoskeletal: Negative for arthralgias, back pain, joint swelling and myalgias.   Skin: Negative for rash.   Neurological: Positive for numbness. Negative for dizziness, syncope, weakness, light-headedness and headaches.   Hematological: Negative for adenopathy. Does not bruise/bleed easily.   Psychiatric/Behavioral: Negative for confusion and sleep disturbance. The patient is not nervous/anxious.          Objective      Vitals:    08/12/19 0851   BP: 166/70   Pulse: 100   Resp: 16   Temp: 98.3 °F (36.8 °C)   SpO2: 97%      Current Status 8/12/2019   ECOG score 0   Pain 0/10    Physical Exam   Constitutional: She is oriented to person, place, and time. She appears well-developed and well-nourished.   HENT:   Mouth/Throat: Oropharynx is clear and moist.   Eyes: Conjunctivae are normal.   Neck: No thyromegaly present.   Cardiovascular: Normal rate and regular rhythm. Exam reveals no gallop and no friction rub.   No murmur heard.  Pulmonary/Chest: Breath sounds normal. No respiratory distress.   Abdominal: Soft. Bowel sounds are normal. She exhibits no distension. There is no tenderness.   Musculoskeletal: She exhibits edema.   1+ chronic bilateral lower extremity edema with stasis changes noted   Lymphadenopathy:        Head (right side): No submandibular adenopathy present.     She has no cervical  adenopathy.     She has no axillary adenopathy.        Right: No inguinal and no supraclavicular adenopathy present.        Left: No inguinal and no supraclavicular adenopathy present.   Neurological: She is alert and oriented to person, place, and time. She displays normal reflexes. No cranial nerve deficit. She exhibits normal muscle tone.   Skin: Skin is warm and dry. No rash noted.   Psychiatric: She has a normal mood and affect. Her behavior is normal.       RECENT LABS:  Hematology WBC   Date Value Ref Range Status   08/12/2019 7.58 3.40 - 10.80 10*3/mm3 Final   03/16/2019 7.6 3.4 - 10.8 x10E3/uL Final     RBC   Date Value Ref Range Status   08/12/2019 3.71 (L) 3.77 - 5.28 10*6/mm3 Final   03/16/2019 3.80 3.77 - 5.28 x10E6/uL Final     Hemoglobin   Date Value Ref Range Status   08/12/2019 10.2 (L) 12.0 - 15.9 g/dL Final     Hematocrit   Date Value Ref Range Status   08/12/2019 34.9 34.0 - 46.6 % Final     Platelets   Date Value Ref Range Status   08/12/2019 269 140 - 450 10*3/mm3 Final        Lab Results   Component Value Date    GLUCOSE 163 (H) 08/12/2019    BUN 32 (H) 08/12/2019    CREATININE 1.80 (H) 08/12/2019    EGFRIFNONA 29 (L) 08/12/2019    EGFRIFAFRI 35 (L) 03/16/2019    BCR 17.8 08/12/2019    K 5.3 (H) 08/12/2019    CO2 26.3 08/12/2019    CALCIUM 8.7 08/12/2019    PROTENTOTREF 7.0 03/16/2019    ALBUMIN 3.60 08/12/2019    LABIL2 1.2 03/16/2019    AST 28 08/12/2019    ALT 34 (H) 08/12/2019     Multiple studies reviewed including laboratory studies, PET scan (I personally reviewed PET scan images) and pathology results from CT-guided omental biopsy as outlined below.    Assessment/Plan    1. Metastatic lobular breast cancer (ER/MO positive, HER-2/tj negative):  · History of stage IIIC right breast cancer ([id number])  · Patient treated previously in the [facility]  · Diagnosis via excisional biopsy 8/23/2003 with lobular carcinoma, 4.5 cm, positive margins.  ER/MO positive, HER-2/tj  negative.  · Staging evaluation in September 2003 with negative bone scan and negative CT scans.  Ejection fraction 65%.  · Received neoadjuvant chemotherapy (? GET regimen) which apparently included Taxotere and possibly epirubicin.  Received 6 cycles.  · 1/22/2004 bilateral mastectomy with right axillary dissection.  Per available records, 10-12 cm residual invasive lobular carcinoma in the breast with 14/16 lymph nodes positive with extranodal extension.  Immediate reconstruction.  · Received adjuvant chemotherapy with carboplatin and navelbine x4 cycles  · Initiated adjuvant tamoxifen 6/22/2004  · Adjuvant radiation therapy initiated but interrupted due to chest wall cellulitis requiring removal of implants in August 2004  · Implant reconstruction again attempted with MRSA infection, implant removed 12/30/2005 with no further attempts made and reconstruction.  · Completed 5 years tamoxifen in June 2009 and subsequently transitioned to Femara.  Received Femara until June 2014.  · Patient experienced postmenopausal vaginal bleeding in 2013, negative endometrial biopsy and gynecologic evaluation.  · Patient last seen in [facility] 6/18/2014  · CT chest performed to evaluate bicuspid aortic valve and dilated asending aorta 7/12/2019.  CT showed no change in aorta however showed new free intraperitoneal fluid in the upper abdomen with mesenteric edema, concerning for carcinomatosis.  · CT abdomen and pelvis (without IV contrast) on 7/16/2019 with mesenteric thickening, small volume of complex fluid in the mesentery which was suspicious and possibly representative of carcinomatosis, small amount of perihepatic fluid, small bowel loops tethered to omentum without obstruction, omental thickening, shotty retroperitoneal and pelvic lymph nodes (non-pathologically enlarged).  · PET scan 7/26/2019 with hypermetabolic omental activity, hypermetabolic small retroperitoneal lymph nodes, hypermetabolic activity throughout  uterus with increase in size.  · CT-guided omental biopsy 7/30/2019 with metastatic lobular carcinoma of breast primary, ER positive (greater than 95%), VA positive (90%), HER-2/tj negative (1+ IHC)  · Baseline CA 15-3 on 7/22/19 was 32.6  · Initiation of Aromasin 25 mg daily 8/12/2019.  Plans to begin Ibrance at 100 mg 21/28 days on 8/26/2019.  · I had a lengthy conversation with the patient and her  today.  We reviewed results from her PET scan indicating evidence of metastatic disease involving the omentum as well as retroperitoneal lymph nodes.  We reviewed results from her CT-guided omental biopsy on 7/30/2019 which unfortunately confirms evidence of metastatic lobular breast cancer.  Her disease remains ER positive (greater than 95%), VA +2090%), and HER-2/tj negative (1+ IHC) as it was before.  Fortunately she is relatively asymptomatic from her disease at this point.  We discussed the palliative nature of further treatment.  The patient and her  expressed understanding.  We discussed the intent to use endocrine therapy initially.  She did have some difficulty with arthralgias on Femara.  We will attempt treatment with Aromasin 25 mg daily.  The patient is preparing to go on a cruise this weekend and will be gone for a week.  Therefore we will hold on initiation of Ibrance.  I do have concerns regarding her ability to tolerate full dose Ibrance and will start at 100 mg per 21/28 days when she returns on 8/26/2019.  She will undergo education this week for Ibrance.  We will go ahead and schedule CBC in 3 weeks with RN review and I will see her in 4 weeks when she is midway through her first cycle.  In 5 weeks she will have a CBC and RN review and I will see her again in 6 weeks when she will be due to begin cycle 2 and we will recheck CA 15-3 again at that time.  We discussed repeat CT scan at the end of cycle 2.  2. Normocytic anemia in the setting of CKD3:  · The patient appears to have a  chronic anemia with hemoglobin in the 10-11 range with normal MCV.  · Patient has underlying CKD3 with baseline creatinine recently in the 1.5-1.8 range.  · Anemia evaluation 7/22/2019 with iron 30, ferritin 85.2, iron saturation 10%, TIBC 311, B12 370, erythropoietin level 20.4  · Anemia felt in large part to be related to CKD3 as well as to underlying malignancy  · Evidence of folate deficiency 8/12/2019 with level 3.84, initiated folic acid 1 mg daily  · If hemoglobin consistently below 10 consider use of Procrit  3. CKD3:  · As above, baseline creatinine recently in 1.5-1.8 range  4. CHF with mild to moderate aortic stenosis:  · Recent cardiology evaluation with echocardiogram 7/12/2019 showing ejection fraction 48% with moderate dilation of the LV cavity, global hypokinesis, grade 2 diastolic dysfunction, mild to moderate aortic stenosis, trivial pericardial effusion.  · CT chest 7/12/2019 with no change in the aorta compared to prior study 12/13/2017.  5. Left upper and bilateral lower extremity peripheral neuropathy:  · Patient reports that left upper extremity neuropathy was not present from previous chemotherapy but occurred after hospitalization that required intubation and critical care stay.  Apparently felt to represent critical care neuropathy.  Improved over time.  · Bilateral lower extremity neuropathy felt to be related to diabetes  · May have future implications regarding treatment for breast cancer.  6. Uterine/endometrial activity on PET scan:  · Patient experienced postmenopausal vaginal bleeding in 2013, negative endometrial biopsy and gynecologic evaluation.  · With current findings on PET scan with enlarging uterus and some hypermetabolic activity, will refer back to gynecology at [facility] for potential further evaluation.     Plan:  1. Begin Aromasin 25 mg daily.  Prescription sent today.  2. Begin oral folic acid 1 mg daily  3. Refer back to [facility] for evaluation of PET scan  findings with enlarging uterus and hypermetabolic endometrial activity  4. Education this week for Ibrance  5. In 2 weeks nurse practitioner visit with CBC, CMP, CA 15-3.  Patient will begin Ibrance at that time at dose of 100 mg for 21/28 days.  6. In 3 weeks CBC and RN review  7. In 4 weeks MD visit with CBC, CMP  8. In 5 weeks CBC and RN review  9. In 6 weeks MD visit with CBC, CMP, CA 15-3.  Patient will be ready to begin cycle 2 Aromasin and Ibrance.  We will plan repeat CT scan at the end of that cycle.

## 2019-08-13 NOTE — PROGRESS NOTES
Era quezada with copay card faxed to [facility] FREITAS [phone]  Phone: [phone]

## 2019-08-13 NOTE — PROGRESS NOTES
Staff message rec from Dr Irvin about pt starting Ibrance. See below.    Leodan Irvin Jr., MD sent to [facility]             To clarify patient's Ibrance dose, she will receive 100 mg daily for 21/28 days.  We will plan to begin in 2 weeks when she returns on 8/26/2019. Thanks!      PA submitted to [facility] through covermymeds.    Request approved. Office will be faxed approval letter.

## 2019-08-13 NOTE — TELEPHONE ENCOUNTER
----- Message from Kymberly Morales RN sent at 8/13/2019  8:05 AM EDT -----  Regarding: Dr. Irvin  Per Dr. Irvin:    Please contact patient and asked to begin oral folic acid 1 mg daily. Script already sent in

## 2019-08-13 NOTE — PROGRESS NOTES
Subjective     No primary care provider on file.    PATIENT NAME:  Juana Hall  YOB: 1960  PATIENTS AGE:  58 y.o.  PATIENTS SEX:  female  DATE OF SERVICE:  08/13/2019  PROVIDER:  BETY Sellers      __________ORAL CHEMOTHERAPY PATIENT EDUCATION__________    PATIENT EDUCATION:  Today I met with the patient to discuss ORAL chemotherapy/biotherapy recommended for treatment of her disease.  Also discussed were medication administration, adherence, and proper handling/disposal.  The patient received the Oral Chemotherapy/Biotherapy Plan Summary including diagnosis and specific treatment plan.    SIDE EFFECTS:  Common side effects were discussed with the patient.   Discussion included hair loss/discoloration, anemia/fatigue, infection/chills/fever, appetite, bleeding risk/precautions, constipation, diarrhea, mouth sores, taste alteration, loss of appetite,nausea/vomiting, peripheral neuropathy, skin/nail changes, rash, muscle aches/weakness, photosensitivity, weight gain/loss, hearing loss, dizziness,   heart damage, liver damage, lung damage, kidney damage, DVT/PE risk, fluid retention, pleural/pericardial effusion, somnolence, electrolyte/LFT imbalance.    Discussion also included side effects specific to drugs in the treatment plan, Ibrance specifically.      A total of 45  minutes were spent with the patient, with 100% of time spent in education and counseling.      BETY Sellers

## 2019-08-16 ENCOUNTER — DOCUMENTATION (OUTPATIENT)
Dept: ONCOLOGY | Facility: CLINIC | Age: 59
End: 2019-08-16

## 2019-08-26 ENCOUNTER — OFFICE VISIT (OUTPATIENT)
Dept: ONCOLOGY | Facility: CLINIC | Age: 59
End: 2019-08-26

## 2019-08-26 ENCOUNTER — LAB (OUTPATIENT)
Dept: LAB | Facility: HOSPITAL | Age: 59
End: 2019-08-26

## 2019-08-26 VITALS
SYSTOLIC BLOOD PRESSURE: 149 MMHG | WEIGHT: 293 LBS | DIASTOLIC BLOOD PRESSURE: 65 MMHG | TEMPERATURE: 98.5 F | RESPIRATION RATE: 16 BRPM | HEART RATE: 91 BPM | OXYGEN SATURATION: 95 % | BODY MASS INDEX: 44.41 KG/M2 | HEIGHT: 68 IN

## 2019-08-26 DIAGNOSIS — C50.919 METASTATIC BREAST CANCER: ICD-10-CM

## 2019-08-26 DIAGNOSIS — C50.919 METASTATIC BREAST CANCER: Primary | ICD-10-CM

## 2019-08-26 LAB
ALBUMIN SERPL-MCNC: 3.7 G/DL (ref 3.5–5.2)
ALBUMIN/GLOB SERPL: 0.9 G/DL (ref 1.1–2.4)
ALP SERPL-CCNC: 104 U/L (ref 38–116)
ALT SERPL W P-5'-P-CCNC: 20 U/L (ref 0–33)
ANION GAP SERPL CALCULATED.3IONS-SCNC: 13.9 MMOL/L (ref 5–15)
AST SERPL-CCNC: 16 U/L (ref 0–32)
BASOPHILS # BLD AUTO: 0.04 10*3/MM3 (ref 0–0.2)
BASOPHILS NFR BLD AUTO: 0.5 % (ref 0–1.5)
BILIRUB SERPL-MCNC: 0.7 MG/DL (ref 0.2–1.2)
BUN BLD-MCNC: 36 MG/DL (ref 6–20)
BUN/CREAT SERPL: 18.8 (ref 7.3–30)
CALCIUM SPEC-SCNC: 8.6 MG/DL (ref 8.5–10.2)
CANCER AG15-3 SERPL-ACNC: 31.2 U/ML
CHLORIDE SERPL-SCNC: 102 MMOL/L (ref 98–107)
CO2 SERPL-SCNC: 24.1 MMOL/L (ref 22–29)
CREAT BLD-MCNC: 1.91 MG/DL (ref 0.6–1.1)
DEPRECATED RDW RBC AUTO: 48.8 FL (ref 37–54)
EOSINOPHIL # BLD AUTO: 0.4 10*3/MM3 (ref 0–0.4)
EOSINOPHIL NFR BLD AUTO: 5.4 % (ref 0.3–6.2)
ERYTHROCYTE [DISTWIDTH] IN BLOOD BY AUTOMATED COUNT: 14.6 % (ref 12.3–15.4)
GFR SERPL CREATININE-BSD FRML MDRD: 27 ML/MIN/1.73
GLOBULIN UR ELPH-MCNC: 4 GM/DL (ref 1.8–3.5)
GLUCOSE BLD-MCNC: 253 MG/DL (ref 74–124)
HCT VFR BLD AUTO: 32.1 % (ref 34–46.6)
HGB BLD-MCNC: 9.9 G/DL (ref 12–15.9)
IMM GRANULOCYTES # BLD AUTO: 0.05 10*3/MM3 (ref 0–0.05)
IMM GRANULOCYTES NFR BLD AUTO: 0.7 % (ref 0–0.5)
LYMPHOCYTES # BLD AUTO: 1.46 10*3/MM3 (ref 0.7–3.1)
LYMPHOCYTES NFR BLD AUTO: 19.9 % (ref 19.6–45.3)
MCH RBC QN AUTO: 28.5 PG (ref 26.6–33)
MCHC RBC AUTO-ENTMCNC: 30.8 G/DL (ref 31.5–35.7)
MCV RBC AUTO: 92.5 FL (ref 79–97)
MONOCYTES # BLD AUTO: 0.72 10*3/MM3 (ref 0.1–0.9)
MONOCYTES NFR BLD AUTO: 9.8 % (ref 5–12)
NEUTROPHILS # BLD AUTO: 4.67 10*3/MM3 (ref 1.7–7)
NEUTROPHILS NFR BLD AUTO: 63.7 % (ref 42.7–76)
NRBC BLD AUTO-RTO: 0 /100 WBC (ref 0–0.2)
PLATELET # BLD AUTO: 232 10*3/MM3 (ref 140–450)
PMV BLD AUTO: 9.3 FL (ref 6–12)
POTASSIUM BLD-SCNC: 4.7 MMOL/L (ref 3.5–4.7)
PROT SERPL-MCNC: 7.7 G/DL (ref 6.3–8)
RBC # BLD AUTO: 3.47 10*6/MM3 (ref 3.77–5.28)
SODIUM BLD-SCNC: 140 MMOL/L (ref 134–145)
WBC NRBC COR # BLD: 7.34 10*3/MM3 (ref 3.4–10.8)

## 2019-08-26 PROCEDURE — 99213 OFFICE O/P EST LOW 20 MIN: CPT | Performed by: NURSE PRACTITIONER

## 2019-08-26 PROCEDURE — G0463 HOSPITAL OUTPT CLINIC VISIT: HCPCS | Performed by: NURSE PRACTITIONER

## 2019-08-26 PROCEDURE — 85025 COMPLETE CBC W/AUTO DIFF WBC: CPT | Performed by: NURSE PRACTITIONER

## 2019-08-26 PROCEDURE — 36415 COLL VENOUS BLD VENIPUNCTURE: CPT | Performed by: NURSE PRACTITIONER

## 2019-08-26 PROCEDURE — 80053 COMPREHEN METABOLIC PANEL: CPT | Performed by: NURSE PRACTITIONER

## 2019-08-26 PROCEDURE — 86300 IMMUNOASSAY TUMOR CA 15-3: CPT | Performed by: INTERNAL MEDICINE

## 2019-08-26 NOTE — PROGRESS NOTES
Subjective .     REASONS FOR FOLLOWUP:    1. Metastatic lobular breast cancer (ER/ND positive, HER-2/tj negative):  · History of stage IIIC right breast cancer ([id number])  · Patient treated previously in the [facility] system  · Diagnosis via excisional biopsy 8/23/2003 with lobular carcinoma, 4.5 cm, positive margins.  ER/ND positive, HER-2/tj negative.  · Staging evaluation in September 2003 with negative bone scan and negative CT scans.  Ejection fraction 65%.  · Received neoadjuvant chemotherapy (? GET regimen) which apparently included Taxotere and possibly epirubicin.  Received 6 cycles.  · 1/22/2004 bilateral mastectomy with right axillary dissection.  Per available records, 10-12 cm residual invasive lobular carcinoma in the breast with 14/16 lymph nodes positive with extranodal extension.  Immediate reconstruction.  · Received adjuvant chemotherapy with carboplatin and navelbine x4 cycles  · Initiated adjuvant tamoxifen 6/22/2004  · Adjuvant radiation therapy initiated but interrupted due to chest wall cellulitis requiring removal of implants in August 2004  · Implant reconstruction again attempted with MRSA infection, implant removed 12/30/2005 with no further attempts made and reconstruction.  · Completed 5 years tamoxifen in June 2009 and subsequently transitioned to Femara.  Received Femara until June 2014.  · Patient experienced postmenopausal vaginal bleeding in 2013, negative endometrial biopsy and gynecologic evaluation.  · Patient last seen in [facility] 6/18/2014  · CT chest performed to evaluate bicuspid aortic valve and dilated asending aorta 7/12/2019.  CT showed no change in aorta however showed new free intraperitoneal fluid in the upper abdomen with mesenteric edema, concerning for carcinomatosis.  · CT abdomen and pelvis (without IV contrast) on 7/16/2019 with mesenteric thickening, small volume of complex fluid in the mesentery which was suspicious and possibly representative of  carcinomatosis, small amount of perihepatic fluid, small bowel loops tethered to omentum without obstruction, omental thickening, shotty retroperitoneal and pelvic lymph nodes (non-pathologically enlarged).  · PET scan 7/26/2019 with hypermetabolic omental activity, hypermetabolic small retroperitoneal lymph nodes, hypermetabolic activity throughout uterus with increase in size.  · CT-guided omental biopsy 7/30/2019 with metastatic lobular carcinoma of breast primary, ER positive (greater than 95%), VT positive (90%), HER-2/tj negative (1+ IHC)  · Baseline CA 15-3 on 7/22/19 was 32.6  · Initiation of Aromasin 25 mg daily 8/12/2019.  Plans to begin Ibrance at 100 mg 21/28 days on 8/26/2019.  2. Anemia:  · Normocytic anemia, hemoglobin in 10-11 range  · Anemia evaluation 7/22/2019 with iron 30, ferritin 85.2, iron saturation 10%, TIBC 311, B12 370, erythropoietin level 20.4  · Anemia felt in large part to be related to CKD3 as well as to underlying malignancy  · Evidence of folate deficiency 8/12/2019 with level 3.84, initiated folic acid 1 mg daily  · If hemoglobin consistently below 10 consider use of Procrit      HISTORY OF PRESENT ILLNESS:  The patient is a 58 y.o. year old female who is here for follow-up with the above-mentioned history.    History of Present Illness   patient returns today to begin Ibrance.  She did have education on the medication.  She has already started Aromasin.  She reports night sweats but no increased arthralgias.    [facility] did [facility] to try to schedule appointment however was told she could be seen October 23.  We will have our office call to try to get her in sooner.    Patient denies any new concerns.  No new pain, fevers, nausea, or increased shortness of breath.    Past Medical History:   Diagnosis Date   • Anemia    • Anxiety and depression    • Bicuspid aortic valve    • Breast cancer (CMS/HCC) 2004    RIGHT, HAD NEELA. MASTECTOMY, CHEMO AND RADIATION   • CKD (chronic  kidney disease)    • Depression    • Diabetes mellitus (CMS/HCC)    • Foot fracture, right     METATARSAL 3-5, WEARING POST OP SHOE   • Frequent UTI    • Heart murmur    • Hyperlipidemia    • Hypertension    • Hyperthyroidism    • Hypothyroidism    • Kidney stones    • Lymphedema of leg    • MRSA infection     WITH BREAST IMPLANT SURGERY   • Neuromuscular disorder (CMS/HCC)    • Obesity    • ANDREW on CPAP    • Osteoarthrosis    • Pap smear, as part of routine gynecological examination     normal   • Post-menopausal bleeding    • Radiculopathy    • Sepsis (CMS/HCC)     FROM KIDNEY STONE   • Thickened endometrium    • Tremor      Past Surgical History:   Procedure Laterality Date   • BREAST RECONSTRUCTION      WITH IMPLANTS, AND REVISION, NOW REMOVED   • BREAST SURGERY     •  SECTION  1987   •  SECTION  1987   • D&C HYSTEROSCOPY N/A 2017    Procedure: DILATATION AND CURETTAGE, HYSTEROSCOPY ;  Surgeon: Cherie Oliveros MD;  Location: [facility] OR [facility] [place];  Service:    • LYMPH NODE BIOPSY     • MASTECTOMY Bilateral 2003   • MASTECTOMY Bilateral          ONCOLOGIC HISTORY:  (History from previous dates can be found in the separate document.)    Current Outpatient Medications on File Prior to Visit   Medication Sig Dispense Refill   • amLODIPine (NORVASC) 10 MG tablet Take 10 mg by mouth Every Morning.     • amLODIPine (NORVASC) 5 MG tablet      • amoxicillin (AMOXIL) 500 MG capsule TAKE 4 CAPSULES BY MOUTH 1 HOUR PRIOR TO DENTAL APPOINTMENT 4 capsule 1   • aspirin 81 MG EC tablet Take 81 mg by mouth Daily     • atorvastatin (LIPITOR) 40 MG tablet Take 1 tablet by mouth Daily. 90 tablet 0   • carvedilol (COREG) 3.125 MG tablet TAKE ONE TABLET BY MOUTH TWICE A DAY 60 tablet 2   • Dulaglutide 1.5 MG/0.5ML solution pen-injector Inject 1.5 mg under the skin into the appropriate area as directed 1 (One) Time Per Week.  12 pen 1   • DULoxetine (CYMBALTA) 60 MG  capsule Take 1 capsule by mouth Daily. 90 capsule 1   • exemestane (AROMASIN) 25 MG chemo tablet Take 1 tablet by mouth Daily. 30 tablet 4   • folic acid (FOLVITE) 1 MG tablet Take 1 tablet by mouth Daily. 30 tablet 3   • gabapentin (NEURONTIN) 300 MG capsule Take 1 capsule by mouth 4 (Four) Times a Day. 360 capsule 1   • glucose blood test strip 1 each by Other route 3 (Three) Times a Day. Use as instructed 100 each 5   • LEVEMIR FLEXTOUCH 100 UNIT/ML injection INJECT 50 UNITS UNDER THE SKIN TWO TIMES A DAY 3 pen 0   • levothyroxine (SYNTHROID, LEVOTHROID) 50 MCG tablet Take 1 tablet by mouth Daily. 90 tablet 1   • ondansetron (ZOFRAN) 8 MG tablet Take 1 tablet by mouth Every 8 (Eight) Hours As Needed for Nausea or Vomiting. 30 tablet 2   • Palbociclib (IBRANCE) 100 MG capsule capsule Take 1 capsule by mouth Daily. For 21 days on then 7 days off. 21 capsule 6   • repaglinide (PRANDIN) 2 MG tablet Take 1 tablet by mouth 3 (Three) Times a Day Before Meals. 270 tablet 1   • vitamin D (ERGOCALCIFEROL) 26576 units capsule capsule        No current facility-administered medications on file prior to visit.        ALLERGIES:   No Known Allergies    Social History     Socioeconomic History   • Marital status:      Spouse name: Not on file   • Number of children: Not on file   • Years of education: Not on file   • Highest education level: Not on file   Tobacco Use   • Smoking status: Never Smoker   • Smokeless tobacco: Never Used   Substance and Sexual Activity   • Alcohol use: No   • Drug use: No   • Sexual activity: Yes     Partners: Male     Birth control/protection: Post-menopausal     Family History   Problem Relation Age of Onset   • Coronary artery disease Mother         Mother  from MI at 72   • Diabetes Mother    • Breast cancer Mother    • Hyperlipidemia Mother    • Arthritis Mother    • Cancer Mother    • Aortic aneurysm Father         Father with ruptured thoracic aortic aneurysm   • Coronary  artery disease Father         Father  from MI at 74   • Heart disease Father    • Hyperlipidemia Father    • Arthritis Father    • Coronary artery disease Maternal Grandmother         MGM deceeased from MI at age 76              Review of Systems   Constitutional: Positive for fatigue. Negative for activity change, appetite change, fever and unexpected weight change.   HENT: Negative for congestion, mouth sores, nosebleeds, sore throat and voice change.    Respiratory: Negative for cough, shortness of breath and wheezing.    Cardiovascular: Negative for chest pain, palpitations and leg swelling.   Gastrointestinal: Negative for abdominal distention, abdominal pain, blood in stool, constipation, diarrhea, nausea and vomiting.   Endocrine: Negative for cold intolerance and heat intolerance.   Genitourinary: Negative for difficulty urinating, dysuria, frequency and hematuria.   Musculoskeletal: Negative for arthralgias, back pain, joint swelling and myalgias.   Skin: Negative for rash.   Neurological: Positive for numbness. Negative for dizziness, syncope, weakness, light-headedness and headaches.   Hematological: Negative for adenopathy. Does not bruise/bleed easily.   Psychiatric/Behavioral: Negative for confusion and sleep disturbance. The patient is not nervous/anxious.          Objective      Vitals:    19 0839   BP: 149/65   Pulse: 91   Resp: 16   Temp: 98.5 °F (36.9 °C)   SpO2: 95%      Current Status 2019   ECOG score 1     Pain Score    19 0839   PainSc: 0-No pain         Physical Exam   Constitutional: She is oriented to person, place, and time. She appears well-developed and well-nourished.   HENT:   Mouth/Throat: Oropharynx is clear and moist.   Eyes: Conjunctivae are normal.   Neck: No thyromegaly present.   Cardiovascular: Normal rate and regular rhythm. Exam reveals no gallop and no friction rub.   No murmur heard.  Pulmonary/Chest: Breath sounds normal. No respiratory distress.    Abdominal: Soft. Bowel sounds are normal. She exhibits no distension. There is no tenderness.   Musculoskeletal: She exhibits edema.   1+ chronic bilateral lower extremity edema with stasis changes noted   Lymphadenopathy:     She has no cervical adenopathy.        Right: No supraclavicular adenopathy present.        Left: No supraclavicular adenopathy present.   Neurological: She is alert and oriented to person, place, and time. She displays normal reflexes. No cranial nerve deficit. She exhibits normal muscle tone.   Skin: Skin is warm and dry. No rash noted.   Psychiatric: She has a normal mood and affect. Her behavior is normal.       RECENT LABS:  Hematology WBC   Date Value Ref Range Status   08/26/2019 7.34 3.40 - 10.80 10*3/mm3 Final   03/16/2019 7.6 3.4 - 10.8 x10E3/uL Final     RBC   Date Value Ref Range Status   08/26/2019 3.47 (L) 3.77 - 5.28 10*6/mm3 Final   03/16/2019 3.80 3.77 - 5.28 x10E6/uL Final     Hemoglobin   Date Value Ref Range Status   08/26/2019 9.9 (L) 12.0 - 15.9 g/dL Final     Hematocrit   Date Value Ref Range Status   08/26/2019 32.1 (L) 34.0 - 46.6 % Final     Platelets   Date Value Ref Range Status   08/26/2019 232 140 - 450 10*3/mm3 Final        Lab Results   Component Value Date    GLUCOSE 163 (H) 08/12/2019    BUN 32 (H) 08/12/2019    CREATININE 1.80 (H) 08/12/2019    EGFRIFNONA 29 (L) 08/12/2019    EGFRIFAFRI 35 (L) 03/16/2019    BCR 17.8 08/12/2019    K 5.3 (H) 08/12/2019    CO2 26.3 08/12/2019    CALCIUM 8.7 08/12/2019    PROTENTOTREF 7.0 03/16/2019    ALBUMIN 3.60 08/12/2019    LABIL2 1.2 03/16/2019    AST 28 08/12/2019    ALT 34 (H) 08/12/2019       Assessment/Plan    1. Metastatic lobular breast cancer (ER/ND positive, HER-2/tj negative):  · History of stage IIIC right breast cancer ([id number])  · Patient treated previously in the [facility] system  · Diagnosis via excisional biopsy 8/23/2003 with lobular carcinoma, 4.5 cm, positive margins.  ER/ND positive, HER-2/tj  negative.  · Staging evaluation in September 2003 with negative bone scan and negative CT scans.  Ejection fraction 65%.  · Received neoadjuvant chemotherapy (? GET regimen) which apparently included Taxotere and possibly epirubicin.  Received 6 cycles.  · 1/22/2004 bilateral mastectomy with right axillary dissection.  Per available records, 10-12 cm residual invasive lobular carcinoma in the breast with 14/16 lymph nodes positive with extranodal extension.  Immediate reconstruction.  · Received adjuvant chemotherapy with carboplatin and navelbine x4 cycles  · Initiated adjuvant tamoxifen 6/22/2004  · Adjuvant radiation therapy initiated but interrupted due to chest wall cellulitis requiring removal of implants in August 2004  · Implant reconstruction again attempted with MRSA infection, implant removed 12/30/2005 with no further attempts made and reconstruction.  · Completed 5 years tamoxifen in June 2009 and subsequently transitioned to Femara.  Received Femara until June 2014.  · Patient experienced postmenopausal vaginal bleeding in 2013, negative endometrial biopsy and gynecologic evaluation.  · Patient last seen in [facility] 6/18/2014  · CT chest performed to evaluate bicuspid aortic valve and dilated asending aorta 7/12/2019.  CT showed no change in aorta however showed new free intraperitoneal fluid in the upper abdomen with mesenteric edema, concerning for carcinomatosis.  · CT abdomen and pelvis (without IV contrast) on 7/16/2019 with mesenteric thickening, small volume of complex fluid in the mesentery which was suspicious and possibly representative of carcinomatosis, small amount of perihepatic fluid, small bowel loops tethered to omentum without obstruction, omental thickening, shotty retroperitoneal and pelvic lymph nodes (non-pathologically enlarged).  · PET scan 7/26/2019 with hypermetabolic omental activity, hypermetabolic small retroperitoneal lymph nodes, hypermetabolic activity throughout  uterus with increase in size.  · CT-guided omental biopsy 7/30/2019 with metastatic lobular carcinoma of breast primary, ER positive (greater than 95%), OR positive (90%), HER-2/tj negative (1+ IHC)  · Baseline CA 15-3 on 7/22/19 was 32.6  · Initiation of Aromasin 25 mg daily 8/12/2019.  Plans to begin Ibrance at 100 mg 21/28 days on 8/26/2019.  · On 8/12/19, Dr. Irvin reviewed results from her CT-guided omental biopsy on 7/30/2019 which unfortunately confirms evidence of metastatic lobular breast cancer.  Her disease remains ER positive (greater than 95%), OR +2090%), and HER-2/tj negative (1+ IHC) as it was before.  Fortunately she is relatively asymptomatic from her disease at this point.  We discussed the palliative nature of further treatment.  We discussed the intent to use endocrine therapy initially.  She did have some difficulty with arthralgias on Femara.  We will attempt treatment with Aromasin 25 mg daily.   · Returns today to initiate Ibrance at a reduced dose of 100 mg per 21/28 days.  She is been taking the Aromasin 25 mg daily with no increased arthralgias but does report some hot flashes at night.  Otherwise she feels well today and is ready to proceed with Ibrance.  · She will return in 1 week with CBC with RN review and Dr. Irvin will see her in 2 weeks when she is midway through her first cycle.  In 3 weeks she will have a CBC and RN review and Dr. Irvin will see her again in 4 weeks when she will be due to begin cycle 2 and we will recheck CA 15-3 again at that time.  We will repeat CT scan at the end of cycle 2.  2. Normocytic anemia in the setting of CKD3:  · The patient appears to have a chronic anemia with hemoglobin in the 10-11 range with normal MCV.  · Patient has underlying CKD3 with baseline creatinine recently in the 1.5-1.8 range.  · Anemia evaluation 7/22/2019 with iron 30, ferritin 85.2, iron saturation 10%, TIBC 311, B12 370, erythropoietin level 20.4  · Anemia felt in large part  to be related to CKD3 as well as to underlying malignancy  · Evidence of folate deficiency 8/12/2019 with level 3.84, initiated folic acid 1 mg daily  · If hemoglobin consistently below 10 consider use of Procrit  3. CKD3:  · As above, baseline creatinine recently in 1.5-1.8 range  4. CHF with mild to moderate aortic stenosis:  · Recent cardiology evaluation with echocardiogram 7/12/2019 showing ejection fraction 48% with moderate dilation of the LV cavity, global hypokinesis, grade 2 diastolic dysfunction, mild to moderate aortic stenosis, trivial pericardial effusion.  · CT chest 7/12/2019 with no change in the aorta compared to prior study 12/13/2017.  5. Left upper and bilateral lower extremity peripheral neuropathy:  · Patient reports that left upper extremity neuropathy was not present from previous chemotherapy but occurred after hospitalization that required intubation and critical care stay.  Apparently felt to represent critical care neuropathy.  Improved over time.  · Bilateral lower extremity neuropathy felt to be related to diabetes  · May have future implications regarding treatment for breast cancer.  6. Uterine/endometrial activity on PET scan:  · Patient experienced postmenopausal vaginal bleeding in 2013, negative endometrial biopsy and gynecologic evaluation.  · With current findings on PET scan with enlarging uterus and some hypermetabolic activity, will refer back to gynecology at [facility] for potential further evaluation.     Plan:  1. Continue Aromasin 25 mg daily.    2. Begin Ibrance 100 mg daily 21 out of 28 days.  3. Continue oral folic acid 1 mg daily  4. Refer back to [facility] for evaluation of PET scan findings with enlarging uterus and hypermetabolic endometrial activity  5. In 1 weeks CBC and RN review  6. In 2 weeks MD visit with CBC, CMP  7. In 3 weeks CBC and RN review  8. In 4 weeks MD visit with CBC, CMP, CA 15-3.  Patient will be ready to begin cycle 2 Aromasin and  Ibrance.  We will plan repeat CT scan at the end of that cycle.

## 2019-08-27 ENCOUNTER — OFFICE VISIT (OUTPATIENT)
Dept: FAMILY MEDICINE CLINIC | Facility: CLINIC | Age: 59
End: 2019-08-27

## 2019-08-27 VITALS
BODY MASS INDEX: 44.41 KG/M2 | HEIGHT: 68 IN | SYSTOLIC BLOOD PRESSURE: 130 MMHG | OXYGEN SATURATION: 98 % | TEMPERATURE: 98.4 F | WEIGHT: 293 LBS | DIASTOLIC BLOOD PRESSURE: 60 MMHG | RESPIRATION RATE: 16 BRPM | HEART RATE: 69 BPM

## 2019-08-27 DIAGNOSIS — E03.9 ACQUIRED HYPOTHYROIDISM: ICD-10-CM

## 2019-08-27 DIAGNOSIS — G62.89 OTHER POLYNEUROPATHY: ICD-10-CM

## 2019-08-27 DIAGNOSIS — E11.9 CONTROLLED TYPE 2 DIABETES MELLITUS WITHOUT COMPLICATION, WITH LONG-TERM CURRENT USE OF INSULIN (HCC): ICD-10-CM

## 2019-08-27 DIAGNOSIS — E78.2 MIXED HYPERLIPIDEMIA: ICD-10-CM

## 2019-08-27 DIAGNOSIS — Z79.4 CONTROLLED TYPE 2 DIABETES MELLITUS WITHOUT COMPLICATION, WITH LONG-TERM CURRENT USE OF INSULIN (HCC): ICD-10-CM

## 2019-08-27 DIAGNOSIS — F32.A DEPRESSION, UNSPECIFIED DEPRESSION TYPE: ICD-10-CM

## 2019-08-27 PROCEDURE — 99214 OFFICE O/P EST MOD 30 MIN: CPT | Performed by: NURSE PRACTITIONER

## 2019-08-27 RX ORDER — LEVOTHYROXINE SODIUM 0.05 MG/1
50 TABLET ORAL DAILY
Qty: 90 TABLET | Refills: 1 | Status: SHIPPED | OUTPATIENT
Start: 2019-08-27 | End: 2020-03-05 | Stop reason: SDUPTHER

## 2019-08-27 RX ORDER — INSULIN DETEMIR 100 [IU]/ML
50 INJECTION, SOLUTION SUBCUTANEOUS 2 TIMES DAILY
Qty: 3 PEN | Refills: 5 | Status: SHIPPED | OUTPATIENT
Start: 2019-08-27 | End: 2020-03-05 | Stop reason: SDUPTHER

## 2019-08-27 RX ORDER — GABAPENTIN 300 MG/1
300 CAPSULE ORAL 4 TIMES DAILY
Qty: 360 CAPSULE | Refills: 1 | Status: SHIPPED | OUTPATIENT
Start: 2019-08-27 | End: 2020-03-05 | Stop reason: SDUPTHER

## 2019-08-27 RX ORDER — DULOXETIN HYDROCHLORIDE 60 MG/1
60 CAPSULE, DELAYED RELEASE ORAL DAILY
Qty: 90 CAPSULE | Refills: 1 | Status: SHIPPED | OUTPATIENT
Start: 2019-08-27 | End: 2019-09-03 | Stop reason: SDUPTHER

## 2019-08-27 RX ORDER — ATORVASTATIN CALCIUM 40 MG/1
40 TABLET, FILM COATED ORAL DAILY
Qty: 90 TABLET | Refills: 1 | Status: SHIPPED | OUTPATIENT
Start: 2019-08-27 | End: 2020-03-05 | Stop reason: SDUPTHER

## 2019-08-27 RX ORDER — REPAGLINIDE 2 MG/1
2 TABLET ORAL
Qty: 270 TABLET | Refills: 1 | Status: SHIPPED | OUTPATIENT
Start: 2019-08-27 | End: 2020-03-05 | Stop reason: SDUPTHER

## 2019-08-27 NOTE — PROGRESS NOTES
Subjective   Juana Hall is a 58 y.o. female.     History of Present Illness    Since the last visit, she has overall felt well even considering recent diagnosis of metastatic cancer .  She has Essential Hypertension and well controlled on current medication, Hyperlipidemia with goals met with current Rx and peripheral neuropathy which is well controlle don gabapentin. Has anxiety which is controlled on Cymbalta .  she has been compliant with current medications have reviewed them.  The patient denies medication side effects.  Will refill medications.    Results for orders placed or performed in visit on 08/26/19   Comprehensive Metabolic Panel   Result Value Ref Range    Glucose 253 (H) 74 - 124 mg/dL    BUN 36 (H) 6 - 20 mg/dL    Creatinine 1.91 (C) 0.60 - 1.10 mg/dL    Sodium 140 134 - 145 mmol/L    Potassium 4.7 3.5 - 4.7 mmol/L    Chloride 102 98 - 107 mmol/L    CO2 24.1 22.0 - 29.0 mmol/L    Calcium 8.6 8.5 - 10.2 mg/dL    Total Protein 7.7 6.3 - 8.0 g/dL    Albumin 3.70 3.50 - 5.20 g/dL    ALT (SGPT) 20 0 - 33 U/L    AST (SGOT) 16 0 - 32 U/L    Alkaline Phosphatase 104 38 - 116 U/L    Total Bilirubin 0.7 0.2 - 1.2 mg/dL    eGFR Non African Amer 27 (L) >60 mL/min/1.73    Globulin 4.0 (H) 1.8 - 3.5 gm/dL    A/G Ratio 0.9 (L) 1.1 - 2.4 g/dL    BUN/Creatinine Ratio 18.8 7.3 - 30.0    Anion Gap 13.9 5.0 - 15.0 mmol/L   Cancer Antigen 15-3   Result Value Ref Range    CA 15-3 31.2 (H) <=25.0 U/mL   CBC Auto Differential   Result Value Ref Range    WBC 7.34 3.40 - 10.80 10*3/mm3    RBC 3.47 (L) 3.77 - 5.28 10*6/mm3    Hemoglobin 9.9 (L) 12.0 - 15.9 g/dL    Hematocrit 32.1 (L) 34.0 - 46.6 %    MCV 92.5 79.0 - 97.0 fL    MCH 28.5 26.6 - 33.0 pg    MCHC 30.8 (L) 31.5 - 35.7 g/dL    RDW 14.6 12.3 - 15.4 %    RDW-SD 48.8 37.0 - 54.0 fl    MPV 9.3 6.0 - 12.0 fL    Platelets 232 140 - 450 10*3/mm3    Neutrophil % 63.7 42.7 - 76.0 %    Lymphocyte % 19.9 19.6 - 45.3 %    Monocyte % 9.8 5.0 - 12.0 %    Eosinophil % 5.4 0.3  - 6.2 %    Basophil % 0.5 0.0 - 1.5 %    Immature Grans % 0.7 (H) 0.0 - 0.5 %    Neutrophils, Absolute 4.67 1.70 - 7.00 10*3/mm3    Lymphocytes, Absolute 1.46 0.70 - 3.10 10*3/mm3    Monocytes, Absolute 0.72 0.10 - 0.90 10*3/mm3    Eosinophils, Absolute 0.40 0.00 - 0.40 10*3/mm3    Basophils, Absolute 0.04 0.00 - 0.20 10*3/mm3    Immature Grans, Absolute 0.05 0.00 - 0.05 10*3/mm3    nRBC 0.0 0.0 - 0.2 /100 WBC       Patient had CT scan per cardiology in July which incidentally noted intraperitoneal and mesenteric edema. She was referred back to hematology and had confirmation of metastatic breast cancer.  She has started chemotherapy and will f/u every 1-2 weeks at oncology office. She reports feeling better than she has in a long time and was very surprised by the diagnosis.  She reports she feels like she is still coming to terms with the diagnosis but has positive outlook and feels that she is being well taken care of.   The following portions of the patient's history were reviewed and updated as appropriate: allergies, current medications, past family history, past medical history, past social history, past surgical history and problem list.    Review of Systems   Constitutional: Negative for fatigue and unexpected weight change.   Respiratory: Negative for shortness of breath.    Cardiovascular: Negative for chest pain and palpitations.   Endocrine: Negative for cold intolerance, heat intolerance, polydipsia, polyphagia and polyuria.   Psychiatric/Behavioral: Negative for behavioral problems.       Objective   Physical Exam   Constitutional: She is oriented to person, place, and time. She appears well-developed and well-nourished.   Cardiovascular: Normal rate and regular rhythm.   Pulmonary/Chest: Effort normal and breath sounds normal.   Neurological: She is alert and oriented to person, place, and time.   Psychiatric: She has a normal mood and affect. Judgment normal.   Nursing note and vitals  reviewed.      Assessment/Plan   Juana was seen today for med refill, gabapentin, diabetes and hypothyroidism.    Diagnoses and all orders for this visit:    Mixed hyperlipidemia  -     atorvastatin (LIPITOR) 40 MG tablet; Take 1 tablet by mouth Daily.  -     Lipid panel    Depression, unspecified depression type  -     DULoxetine (CYMBALTA) 60 MG capsule; Take 1 capsule by mouth Daily.    Controlled type 2 diabetes mellitus without complication, with long-term current use of insulin (CMS/[facility])  -     Dulaglutide 1.5 MG/0.5ML solution pen-injector; Inject 1.5 mg under the skin into the appropriate area as directed 1 (One) Time Per Week. MONDAYS  -     LEVEMIR FLEXTOUCH 100 UNIT/ML injection; Inject 50 Units under the skin into the appropriate area as directed 2 (Two) Times a Day.  -     repaglinide (PRANDIN) 2 MG tablet; Take 1 tablet by mouth 3 (Three) Times a Day Before Meals.  -     Lipid panel  -     Hemoglobin A1c    Other polyneuropathy  -     gabapentin (NEURONTIN) 300 MG capsule; Take 1 capsule by mouth 4 (Four) Times a Day.    Acquired hypothyroidism  -     levothyroxine (SYNTHROID, LEVOTHROID) 50 MCG tablet; Take 1 tablet by mouth Daily.          NEELAM reviewed.    Due for labs. Will attempt to add A1c and LP onto specimen collected  Yesterday.

## 2019-08-28 LAB
CHOLEST SERPL-MCNC: 155 MG/DL (ref 100–199)
HBA1C MFR BLD: 7 % (ref 4.8–5.6)
HDLC SERPL-MCNC: 43 MG/DL
LDLC SERPL CALC-MCNC: 87 MG/DL (ref 0–99)
TRIGL SERPL-MCNC: 127 MG/DL (ref 0–149)
VLDLC SERPL-MCNC: 25 MG/DL (ref 5–40)

## 2019-09-03 ENCOUNTER — LAB (OUTPATIENT)
Dept: LAB | Facility: HOSPITAL | Age: 59
End: 2019-09-03

## 2019-09-03 ENCOUNTER — CLINICAL SUPPORT (OUTPATIENT)
Dept: ONCOLOGY | Facility: HOSPITAL | Age: 59
End: 2019-09-03

## 2019-09-03 VITALS
OXYGEN SATURATION: 94 % | HEART RATE: 72 BPM | TEMPERATURE: 98.5 F | DIASTOLIC BLOOD PRESSURE: 67 MMHG | SYSTOLIC BLOOD PRESSURE: 161 MMHG

## 2019-09-03 DIAGNOSIS — F32.A DEPRESSION, UNSPECIFIED DEPRESSION TYPE: ICD-10-CM

## 2019-09-03 DIAGNOSIS — C50.919 METASTATIC BREAST CANCER: ICD-10-CM

## 2019-09-03 LAB
BASOPHILS # BLD AUTO: 0.04 10*3/MM3 (ref 0–0.2)
BASOPHILS NFR BLD AUTO: 0.7 % (ref 0–1.5)
DEPRECATED RDW RBC AUTO: 46.5 FL (ref 37–54)
EOSINOPHIL # BLD AUTO: 0.31 10*3/MM3 (ref 0–0.4)
EOSINOPHIL NFR BLD AUTO: 5.4 % (ref 0.3–6.2)
ERYTHROCYTE [DISTWIDTH] IN BLOOD BY AUTOMATED COUNT: 14.4 % (ref 12.3–15.4)
HCT VFR BLD AUTO: 33.7 % (ref 34–46.6)
HGB BLD-MCNC: 10.4 G/DL (ref 12–15.9)
IMM GRANULOCYTES # BLD AUTO: 0.07 10*3/MM3 (ref 0–0.05)
IMM GRANULOCYTES NFR BLD AUTO: 1.2 % (ref 0–0.5)
LYMPHOCYTES # BLD AUTO: 1.16 10*3/MM3 (ref 0.7–3.1)
LYMPHOCYTES NFR BLD AUTO: 20.4 % (ref 19.6–45.3)
MCH RBC QN AUTO: 28 PG (ref 26.6–33)
MCHC RBC AUTO-ENTMCNC: 30.9 G/DL (ref 31.5–35.7)
MCV RBC AUTO: 90.8 FL (ref 79–97)
MONOCYTES # BLD AUTO: 0.17 10*3/MM3 (ref 0.1–0.9)
MONOCYTES NFR BLD AUTO: 3 % (ref 5–12)
NEUTROPHILS # BLD AUTO: 3.95 10*3/MM3 (ref 1.7–7)
NEUTROPHILS NFR BLD AUTO: 69.3 % (ref 42.7–76)
NRBC BLD AUTO-RTO: 0 /100 WBC (ref 0–0.2)
PLATELET # BLD AUTO: 322 10*3/MM3 (ref 140–450)
PMV BLD AUTO: 9.2 FL (ref 6–12)
RBC # BLD AUTO: 3.71 10*6/MM3 (ref 3.77–5.28)
WBC NRBC COR # BLD: 5.7 10*3/MM3 (ref 3.4–10.8)

## 2019-09-03 PROCEDURE — 85025 COMPLETE CBC W/AUTO DIFF WBC: CPT | Performed by: INTERNAL MEDICINE

## 2019-09-03 PROCEDURE — 36415 COLL VENOUS BLD VENIPUNCTURE: CPT | Performed by: INTERNAL MEDICINE

## 2019-09-03 RX ORDER — DULOXETIN HYDROCHLORIDE 30 MG/1
90 CAPSULE, DELAYED RELEASE ORAL DAILY
Qty: 270 CAPSULE | Refills: 1 | Status: SHIPPED | OUTPATIENT
Start: 2019-09-03 | End: 2020-02-19 | Stop reason: DRUGHIGH

## 2019-09-03 NOTE — PROGRESS NOTES
Pt is here for lab with RN review.  CBC reviewed with pt, counts are stable for this pt at this time. Pt C/O a little nausea. She has Zofran , but it hasn't been bad enough to take it. . BP elevated today. Pt has not taken her Amlodipine today. She will take it when she gets home and eats. She has been on Ibrance for 1 week. She takes it 21/28 days. She continues Aromasin 25 mg daily.  Copy of labs given to pt and f/u appt reviewed. Pt is instructed to call the office with any concerns or new symptoms prior to next visit. Pt vu.   Lab Results   Component Value Date    WBC 5.70 09/03/2019    HGB 10.4 (L) 09/03/2019    HCT 33.7 (L) 09/03/2019    MCV 90.8 09/03/2019     09/03/2019

## 2019-09-04 RX ORDER — CARVEDILOL 3.12 MG/1
TABLET ORAL
Qty: 60 TABLET | Refills: 1 | Status: SHIPPED | OUTPATIENT
Start: 2019-09-04 | End: 2019-09-16

## 2019-09-10 ENCOUNTER — LAB (OUTPATIENT)
Dept: LAB | Facility: HOSPITAL | Age: 59
End: 2019-09-10

## 2019-09-10 ENCOUNTER — OFFICE VISIT (OUTPATIENT)
Dept: ONCOLOGY | Facility: CLINIC | Age: 59
End: 2019-09-10

## 2019-09-10 ENCOUNTER — INFUSION (OUTPATIENT)
Dept: ONCOLOGY | Facility: HOSPITAL | Age: 59
End: 2019-09-10

## 2019-09-10 ENCOUNTER — TELEPHONE (OUTPATIENT)
Dept: ONCOLOGY | Facility: HOSPITAL | Age: 59
End: 2019-09-10

## 2019-09-10 VITALS
TEMPERATURE: 99 F | OXYGEN SATURATION: 94 % | HEIGHT: 68 IN | WEIGHT: 293 LBS | DIASTOLIC BLOOD PRESSURE: 70 MMHG | RESPIRATION RATE: 16 BRPM | HEART RATE: 83 BPM | SYSTOLIC BLOOD PRESSURE: 149 MMHG | BODY MASS INDEX: 44.41 KG/M2

## 2019-09-10 DIAGNOSIS — R79.89 ELEVATED SERUM CREATININE: Primary | ICD-10-CM

## 2019-09-10 DIAGNOSIS — R79.89 ELEVATED SERUM CREATININE: ICD-10-CM

## 2019-09-10 DIAGNOSIS — C50.919 METASTATIC BREAST CANCER: ICD-10-CM

## 2019-09-10 DIAGNOSIS — C50.919 METASTATIC BREAST CANCER: Primary | ICD-10-CM

## 2019-09-10 LAB
ALBUMIN SERPL-MCNC: 4 G/DL (ref 3.5–5.2)
ALBUMIN/GLOB SERPL: 1.1 G/DL (ref 1.1–2.4)
ALP SERPL-CCNC: 81 U/L (ref 38–116)
ALT SERPL W P-5'-P-CCNC: 6 U/L (ref 0–33)
ANION GAP SERPL CALCULATED.3IONS-SCNC: 14.2 MMOL/L (ref 5–15)
AST SERPL-CCNC: 9 U/L (ref 0–32)
BASOPHILS # BLD AUTO: 0.03 10*3/MM3 (ref 0–0.2)
BASOPHILS NFR BLD AUTO: 0.9 % (ref 0–1.5)
BILIRUB SERPL-MCNC: 0.6 MG/DL (ref 0.2–1.2)
BUN BLD-MCNC: 40 MG/DL (ref 6–20)
BUN/CREAT SERPL: 16.4 (ref 7.3–30)
CALCIUM SPEC-SCNC: 9 MG/DL (ref 8.5–10.2)
CHLORIDE SERPL-SCNC: 105 MMOL/L (ref 98–107)
CO2 SERPL-SCNC: 24.8 MMOL/L (ref 22–29)
CREAT BLD-MCNC: 2.44 MG/DL (ref 0.6–1.1)
DEPRECATED RDW RBC AUTO: 51.2 FL (ref 37–54)
EOSINOPHIL # BLD AUTO: 0.17 10*3/MM3 (ref 0–0.4)
EOSINOPHIL NFR BLD AUTO: 5 % (ref 0.3–6.2)
ERYTHROCYTE [DISTWIDTH] IN BLOOD BY AUTOMATED COUNT: 15.5 % (ref 12.3–15.4)
GFR SERPL CREATININE-BSD FRML MDRD: 20 ML/MIN/1.73
GLOBULIN UR ELPH-MCNC: 3.7 GM/DL (ref 1.8–3.5)
GLUCOSE BLD-MCNC: 154 MG/DL (ref 74–124)
HCT VFR BLD AUTO: 31.9 % (ref 34–46.6)
HGB BLD-MCNC: 9.6 G/DL (ref 12–15.9)
IMM GRANULOCYTES # BLD AUTO: 0.02 10*3/MM3 (ref 0–0.05)
IMM GRANULOCYTES NFR BLD AUTO: 0.6 % (ref 0–0.5)
LYMPHOCYTES # BLD AUTO: 1 10*3/MM3 (ref 0.7–3.1)
LYMPHOCYTES NFR BLD AUTO: 29.5 % (ref 19.6–45.3)
MCH RBC QN AUTO: 28.9 PG (ref 26.6–33)
MCHC RBC AUTO-ENTMCNC: 30.1 G/DL (ref 31.5–35.7)
MCV RBC AUTO: 96.1 FL (ref 79–97)
MONOCYTES # BLD AUTO: 0.2 10*3/MM3 (ref 0.1–0.9)
MONOCYTES NFR BLD AUTO: 5.9 % (ref 5–12)
NEUTROPHILS # BLD AUTO: 1.97 10*3/MM3 (ref 1.7–7)
NEUTROPHILS NFR BLD AUTO: 58.1 % (ref 42.7–76)
NRBC BLD AUTO-RTO: 0 /100 WBC (ref 0–0.2)
PLATELET # BLD AUTO: 231 10*3/MM3 (ref 140–450)
PMV BLD AUTO: 8.7 FL (ref 6–12)
POTASSIUM BLD-SCNC: 5 MMOL/L (ref 3.5–4.7)
PROT SERPL-MCNC: 7.7 G/DL (ref 6.3–8)
RBC # BLD AUTO: 3.32 10*6/MM3 (ref 3.77–5.28)
SODIUM BLD-SCNC: 144 MMOL/L (ref 134–145)
WBC NRBC COR # BLD: 3.39 10*3/MM3 (ref 3.4–10.8)

## 2019-09-10 PROCEDURE — 96360 HYDRATION IV INFUSION INIT: CPT | Performed by: INTERNAL MEDICINE

## 2019-09-10 PROCEDURE — 85025 COMPLETE CBC W/AUTO DIFF WBC: CPT | Performed by: INTERNAL MEDICINE

## 2019-09-10 PROCEDURE — 80053 COMPREHEN METABOLIC PANEL: CPT | Performed by: INTERNAL MEDICINE

## 2019-09-10 PROCEDURE — 99214 OFFICE O/P EST MOD 30 MIN: CPT | Performed by: INTERNAL MEDICINE

## 2019-09-10 PROCEDURE — 36415 COLL VENOUS BLD VENIPUNCTURE: CPT | Performed by: INTERNAL MEDICINE

## 2019-09-10 RX ORDER — SODIUM CHLORIDE 9 MG/ML
1000 INJECTION, SOLUTION INTRAVENOUS ONCE
Status: CANCELLED | OUTPATIENT
Start: 2019-09-10

## 2019-09-10 RX ADMIN — SODIUM CHLORIDE 1000 ML: 900 INJECTION, SOLUTION INTRAVENOUS at 13:53

## 2019-09-10 NOTE — PROGRESS NOTES
Last Weight:      Message   Received: Today   Message Contents   Leodan Irvin Jr., MD  P Mg[facility] Onc [facility] Clinical Pool   Cc: Tamara Rosario, RN             Please contact patient about elevated creatinine above baseline today. Would like for her to return today for 1L NS if possible and recheck labs on Thursday with cbc/cmp and rn review. Also need to add cmp to labs next week on 9/17 with rn review.      NS order and lab orders placed per Dr Irvin  Message to appt desk to arrange

## 2019-09-10 NOTE — TELEPHONE ENCOUNTER
Notified patient. Matthew is placing order and message sent to scheduling. She will be back in this afternoon.     ----- Message from Leodan Irvin Jr., MD sent at 9/10/2019 10:40 AM EDT -----  Please contact patient about elevated creatinine above baseline today. Would like for her to return today for 1L NS if possible and recheck labs on Thursday with cbc/cmp and rn review. Also need to add cmp to labs next week on 9/17 with rn review.  ----- Message -----  From: Lab, Background User  Sent: 9/10/2019   8:51 AM  To: Leodan Irvin Jr., MD

## 2019-09-10 NOTE — PROGRESS NOTES
Subjective .     REASONS FOR FOLLOWUP:    1. Metastatic lobular breast cancer (ER/SC positive, HER-2/tj negative):  · History of stage IIIC right breast cancer ([id number])  · Patient treated previously in the [facility] system  · Diagnosis via excisional biopsy 8/23/2003 with lobular carcinoma, 4.5 cm, positive margins.  ER/SC positive, HER-2/tj negative.  · Staging evaluation in September 2003 with negative bone scan and negative CT scans.  Ejection fraction 65%.  · Received neoadjuvant chemotherapy (? GET regimen) which apparently included Taxotere and possibly epirubicin.  Received 6 cycles.  · 1/22/2004 bilateral mastectomy with right axillary dissection.  Per available records, 10-12 cm residual invasive lobular carcinoma in the breast with 14/16 lymph nodes positive with extranodal extension.  Immediate reconstruction.  · Received adjuvant chemotherapy with carboplatin and navelbine x4 cycles  · Initiated adjuvant tamoxifen 6/22/2004  · Adjuvant radiation therapy initiated but interrupted due to chest wall cellulitis requiring removal of implants in August 2004  · Implant reconstruction again attempted with MRSA infection, implant removed 12/30/2005 with no further attempts made and reconstruction.  · Completed 5 years tamoxifen in June 2009 and subsequently transitioned to Femara.  Received Femara until June 2014.  · Patient experienced postmenopausal vaginal bleeding in 2013, negative endometrial biopsy and gynecologic evaluation.  · Patient last seen [facility]/18/[facility]  · CT chest performed to evaluate bicuspid aortic valve and dilated asending aorta 7/12/2019.  CT showed no change in aorta however showed new free intraperitoneal fluid in the upper abdomen with mesenteric edema, concerning for carcinomatosis.  · CT abdomen and pelvis (without IV contrast) on 7/16/2019 with mesenteric thickening, small volume of complex fluid in the mesentery which was suspicious and possibly representative of  carcinomatosis, small amount of perihepatic fluid, small bowel loops tethered to omentum without obstruction, omental thickening, shotty retroperitoneal and pelvic lymph nodes (non-pathologically enlarged).  · PET scan 7/26/2019 with hypermetabolic omental activity, hypermetabolic small retroperitoneal lymph nodes, hypermetabolic activity throughout uterus with increase in size.  · CT-guided omental biopsy 7/30/2019 with metastatic lobular carcinoma of breast primary, ER positive (greater than 95%), IL positive (90%), HER-2/tj negative (1+ IHC)  · Baseline CA 15-3 on 7/22/19 was 32.6  · Initiation of Aromasin 25 mg daily 8/12/2019.  Initiated Ibrance on 8/26/2019 at 100 mg 21/28 days on 8/26/2019.  Plan repeat scans at the end of 2 cycles Ibrance  2. Anemia:  · Normocytic anemia, hemoglobin in 10-11 range  · Anemia evaluation 7/22/2019 with iron 30, ferritin 85.2, iron saturation 10%, TIBC 311, B12 370, erythropoietin level 20.4  · Anemia felt in large part to be related to CKD3 as well as to underlying malignancy  · Evidence of folate deficiency 8/12/2019 with level 3.84, initiated folic acid 1 mg daily  · If hemoglobin consistently below 10 consider use of Procrit      HISTORY OF PRESENT ILLNESS:  The patient is a 58 y.o. year old female who is here for follow-up with the above-mentioned history.    History of Present Illness   patient returns today in follow-up continuing on Aromasin 25 mg daily and Ibrance 100 mg daily for 21/28 days there was initiated on 8/26/2019.  Therefore she has 1 week remaining on her current Ibrance cycle.  She has tolerated treatment reasonably well.  She has had some ongoing fatigue.  She reports some mild nausea but has been reluctant to take antiemetics for unclear reasons.  She has not experienced any emesis.  Her nausea occurs mainly after eating dinner.  She did see Dr. Oliveros and is scheduled for a D&C on 9/19/2019.    Past Medical History:   Diagnosis Date   • Anemia    •  Anxiety and depression    • Bicuspid aortic valve    • Breast cancer (CMS/HCC)     RIGHT, HAD NEELA. MASTECTOMY, CHEMO AND RADIATION   • CKD (chronic kidney disease)    • Depression    • Diabetes mellitus (CMS/HCC)    • Foot fracture, right     METATARSAL 3-5, WEARING POST OP SHOE   • Frequent UTI    • Heart murmur    • Hyperlipidemia    • Hypertension    • Hyperthyroidism    • Hypothyroidism    • Kidney stones    • Lymphedema of leg    • MRSA infection     WITH BREAST IMPLANT SURGERY   • Neuromuscular disorder (CMS/HCC)    • Obesity    • ANDREW on CPAP    • Osteoarthrosis    • Pap smear, as part of routine gynecological examination     normal   • Post-menopausal bleeding    • Radiculopathy    • Sepsis (CMS/HCC)     FROM KIDNEY STONE   • Thickened endometrium    • Tremor      Past Surgical History:   Procedure Laterality Date   • BREAST RECONSTRUCTION      WITH IMPLANTS, AND REVISION, NOW REMOVED   • BREAST SURGERY     •  SECTION  1987   •  SECTION  1987   • D&C HYSTEROSCOPY N/A 2017    Procedure: DILATATION AND CURETTAGE, HYSTEROSCOPY ;  Surgeon: Cherie Oliveros MD;  [place] [facility] [place] [facility] [place]  Service:    • LYMPH NODE BIOPSY     • MASTECTOMY Bilateral 2003   • MASTECTOMY Bilateral          ONCOLOGIC HISTORY:  (History from previous dates can be found in the separate document.)    Current Outpatient Medications on File Prior to Visit   Medication Sig Dispense Refill   • amLODIPine (NORVASC) 10 MG tablet Take 10 mg by mouth Every Morning.     • aspirin 81 MG EC tablet Take 81 mg by mouth Daily     • atorvastatin (LIPITOR) 40 MG tablet Take 1 tablet by mouth Daily. 90 tablet 1   • carvedilol (COREG) 3.125 MG tablet TAKE ONE TABLET BY MOUTH TWICE A DAY 60 tablet 1   • Dulaglutide 1.5 MG/0.5ML solution pen-injector Inject 1.5 mg under the skin into the appropriate area as directed 1 (One) Time Per Week.  12 pen 1   • DULoxetine (CYMBALTA) 30 MG  capsule Take 3 capsules by mouth Daily. 270 capsule 1   • exemestane (AROMASIN) 25 MG chemo tablet Take 1 tablet by mouth Daily. 30 tablet 4   • folic acid (FOLVITE) 1 MG tablet Take 1 tablet by mouth Daily. 30 tablet 3   • gabapentin (NEURONTIN) 300 MG capsule Take 1 capsule by mouth 4 (Four) Times a Day. 360 capsule 1   • glucose blood test strip 1 each by Other route 3 (Three) Times a Day. Use as instructed 100 each 5   • LEVEMIR FLEXTOUCH 100 UNIT/ML injection Inject 50 Units under the skin into the appropriate area as directed 2 (Two) Times a Day. 3 pen 5   • levothyroxine (SYNTHROID, LEVOTHROID) 50 MCG tablet Take 1 tablet by mouth Daily. 90 tablet 1   • ondansetron (ZOFRAN) 8 MG tablet Take 1 tablet by mouth Every 8 (Eight) Hours As Needed for Nausea or Vomiting. 30 tablet 2   • Palbociclib (IBRANCE) 100 MG capsule capsule Take 1 capsule by mouth Daily. For 21 days on then 7 days off. 21 capsule 6   • repaglinide (PRANDIN) 2 MG tablet Take 1 tablet by mouth 3 (Three) Times a Day Before Meals. 270 tablet 1   • vitamin D (ERGOCALCIFEROL) 37673 units capsule capsule        No current facility-administered medications on file prior to visit.        ALLERGIES:   No Known Allergies    Social History     Socioeconomic History   • Marital status:      Spouse name: Not on file   • Number of children: Not on file   • Years of education: Not on file   • Highest education level: Not on file   Tobacco Use   • Smoking status: Never Smoker   • Smokeless tobacco: Never Used   Substance and Sexual Activity   • Alcohol use: No   • Drug use: No   • Sexual activity: Yes     Partners: Male     Birth control/protection: Post-menopausal     Family History   Problem Relation Age of Onset   • Coronary artery disease Mother         Mother  from MI at 72   • Diabetes Mother    • Breast cancer Mother    • Hyperlipidemia Mother    • Arthritis Mother    • Cancer Mother    • Aortic aneurysm Father         Father with  ruptured thoracic aortic aneurysm   • Coronary artery disease Father         Father  from MI at 74   • Heart disease Father    • Hyperlipidemia Father    • Arthritis Father    • Coronary artery disease Maternal Grandmother         MGM deceeased from MI at age 76              Review of Systems   Constitutional: Positive for fatigue. Negative for activity change, appetite change, fever and unexpected weight change.   HENT: Negative for congestion, mouth sores, nosebleeds, sore throat and voice change.    Respiratory: Negative for cough, shortness of breath and wheezing.    Cardiovascular: Negative for chest pain, palpitations and leg swelling.   Gastrointestinal: Positive for nausea. Negative for abdominal distention, abdominal pain, blood in stool, constipation, diarrhea and vomiting.   Endocrine: Negative for cold intolerance and heat intolerance.   Genitourinary: Negative for difficulty urinating, dysuria, frequency and hematuria.   Musculoskeletal: Negative for arthralgias, back pain, joint swelling and myalgias.   Skin: Negative for rash.   Neurological: Positive for numbness. Negative for dizziness, syncope, weakness, light-headedness and headaches.   Hematological: Negative for adenopathy. Does not bruise/bleed easily.   Psychiatric/Behavioral: Negative for confusion and sleep disturbance. The patient is not nervous/anxious.          Objective      Vitals:    09/10/19 0920   BP: 149/70   Pulse: 83   Resp: 16   Temp: 99 °F (37.2 °C)   SpO2: 94%      Current Status 9/10/2019   ECOG score 0   Pain 0/10    Physical Exam   Constitutional: She is oriented to person, place, and time. She appears well-developed and well-nourished.   HENT:   Mouth/Throat: Oropharynx is clear and moist.   Eyes: Conjunctivae are normal.   Neck: No thyromegaly present.   Cardiovascular: Normal rate and regular rhythm. Exam reveals no gallop and no friction rub.   Murmur heard.  Pulmonary/Chest: Breath sounds normal. No respiratory  distress.   Abdominal: Soft. Bowel sounds are normal. She exhibits no distension. There is no tenderness.   Musculoskeletal: She exhibits edema.   1+ chronic bilateral lower extremity edema with stasis changes noted   Lymphadenopathy:        Head (right side): No submandibular adenopathy present.     She has no cervical adenopathy.     She has no axillary adenopathy.        Right: No inguinal and no supraclavicular adenopathy present.        Left: No inguinal and no supraclavicular adenopathy present.   Neurological: She is alert and oriented to person, place, and time. She displays normal reflexes. No cranial nerve deficit. She exhibits normal muscle tone.   Skin: Skin is warm and dry. No rash noted.   Psychiatric: She has a normal mood and affect. Her behavior is normal.       RECENT LABS:  Hematology WBC   Date Value Ref Range Status   09/10/2019 3.39 (L) 3.40 - 10.80 10*3/mm3 Final   03/16/2019 7.6 3.4 - 10.8 x10E3/uL Final     RBC   Date Value Ref Range Status   09/10/2019 3.32 (L) 3.77 - 5.28 10*6/mm3 Final   03/16/2019 3.80 3.77 - 5.28 x10E6/uL Final     Hemoglobin   Date Value Ref Range Status   09/10/2019 9.6 (L) 12.0 - 15.9 g/dL Final     Hematocrit   Date Value Ref Range Status   09/10/2019 31.9 (L) 34.0 - 46.6 % Final     Platelets   Date Value Ref Range Status   09/10/2019 231 140 - 450 10*3/mm3 Final        Lab Results   Component Value Date    GLUCOSE 154 (H) 09/10/2019    BUN 40 (H) 09/10/2019    CREATININE 2.44 (C) 09/10/2019    EGFRIFNONA 20 (L) 09/10/2019    EGFRIFAFRI 35 (L) 03/16/2019    BCR 16.4 09/10/2019    K 5.0 (H) 09/10/2019    CO2 24.8 09/10/2019    CALCIUM 9.0 09/10/2019    PROTENTOTREF 7.0 03/16/2019    ALBUMIN 4.00 09/10/2019    LABIL2 1.2 03/16/2019    AST 9 09/10/2019    ALT 6 09/10/2019         Assessment/Plan    1. Metastatic lobular breast cancer (ER/MA positive, HER-2/tj negative):  · History of stage IIIC right breast cancer ([id number])  · Patient treated previously in the  [facility] system  · Diagnosis via excisional biopsy 8/23/2003 with lobular carcinoma, 4.5 cm, positive margins.  ER/WA positive, HER-2/tj negative.  · Staging evaluation in September 2003 with negative bone scan and negative CT scans.  Ejection fraction 65%.  · Received neoadjuvant chemotherapy (? GET regimen) which apparently included Taxotere and possibly epirubicin.  Received 6 cycles.  · 1/22/2004 bilateral mastectomy with right axillary dissection.  Per available records, 10-12 cm residual invasive lobular carcinoma in the breast with 14/16 lymph nodes positive with extranodal extension.  Immediate reconstruction.  · Received adjuvant chemotherapy with carboplatin and navelbine x4 cycles  · Initiated adjuvant tamoxifen 6/22/2004  · Adjuvant radiation therapy initiated but interrupted due to chest wall cellulitis requiring removal of implants in August 2004  · Implant reconstruction again attempted with MRSA infection, implant removed 12/30/2005 with no further attempts made and reconstruction.  · Completed 5 years tamoxifen in June 2009 and subsequently transitioned to Femara.  Received Femara until June 2014.  · Patient experienced postmenopausal vaginal bleeding in 2013, negative endometrial biopsy and gynecologic evaluation.  · Patient last seen in [facility] system 6/18/2014  · CT chest performed to evaluate bicuspid aortic valve and dilated asending aorta 7/12/2019.  CT showed no change in aorta however showed new free intraperitoneal fluid in the upper abdomen with mesenteric edema, concerning for carcinomatosis.  · CT abdomen and pelvis (without IV contrast) on 7/16/2019 with mesenteric thickening, small volume of complex fluid in the mesentery which was suspicious and possibly representative of carcinomatosis, small amount of perihepatic fluid, small bowel loops tethered to omentum without obstruction, omental thickening, shotty retroperitoneal and pelvic lymph nodes (non-pathologically enlarged).  · PET  scan 7/26/2019 with hypermetabolic omental activity, hypermetabolic small retroperitoneal lymph nodes, hypermetabolic activity throughout uterus with increase in size.  · CT-guided omental biopsy 7/30/2019 with metastatic lobular carcinoma of breast primary, ER positive (greater than 95%), MS positive (90%), HER-2/tj negative (1+ IHC)  · Baseline CA 15-3 on 7/22/19 was 32.6  · Initiation of Aromasin 25 mg daily 8/12/2019.  Initiated Ibrance at 100 mg 21/28 days on 8/26/2019.  · The patient returns today in follow-up tolerating treatment extremely well.  She has had some mild fatigue and some mild nausea.  Apart from that she has done well.  Nausea will be discussed below and I have asked her to use Zofran as needed.  She has not been using this for unclear reasons.  On her labs today, her creatinine was increased above her baseline at 2.44, BUN 40.  We are asked to repeat to return to the office and administered 1 L normal saline today.  She will return in 2 days and we will recheck CBC and CMP with RN review.  She will return in 1 week for CBC, CMP and RN review and I will see her back in 2 weeks for MD visit, CBC, CMP, CA 15-3.  At the end of the second month of treatment we will plan to repeat her scans.  2. Normocytic anemia in the setting of CKD3:  · The patient appears to have a chronic anemia with hemoglobin in the 10-11 range with normal MCV.  · Patient has underlying CKD3 with baseline creatinine recently in the 1.5-1.8 range.  · Anemia evaluation 7/22/2019 with iron 30, ferritin 85.2, iron saturation 10%, TIBC 311, B12 370, erythropoietin level 20.4  · Anemia felt in large part to be related to CKD3 as well as to underlying malignancy  · Evidence of folate deficiency 8/12/2019 with level 3.84, initiated folic acid 1 mg daily  · If hemoglobin consistently below 10 consider use of Procrit.   · Hemoglobin today 9.6, will not pursue Procrit just yet.  3. CKD3:  · As above, baseline creatinine recently in  1.5-1.8 range  · Today, RYAN/CKD3 with creatinine up to 2.44, BUN of 40.  Hopefully this is related to volume issues and we are administering 1 L normal saline.  She will return in 2 days and we will recheck creatinine.  She will return in 1 week as well for labs and RN review.  4. CHF with mild to moderate aortic stenosis:  · Recent cardiology evaluation with echocardiogram 7/12/2019 showing ejection fraction 48% with moderate dilation of the LV cavity, global hypokinesis, grade 2 diastolic dysfunction, mild to moderate aortic stenosis, trivial pericardial effusion.  · CT chest 7/12/2019 with no change in the aorta compared to prior study 12/13/2017.  5. Left upper and bilateral lower extremity peripheral neuropathy:  · Patient reports that left upper extremity neuropathy was not present from previous chemotherapy but occurred after hospitalization that required intubation and critical care stay.  Apparently felt to represent critical care neuropathy.  Improved over time.  · Bilateral lower extremity neuropathy felt to be related to diabetes  · May have future implications regarding treatment for breast cancer.  6. Uterine/endometrial activity on PET scan:  · Patient experienced postmenopausal vaginal bleeding in 2013, negative endometrial biopsy and gynecologic evaluation.  · With current findings on PET scan with enlarging uterus and some hypermetabolic activity, referred back to Dr. Oliveros and gynecology.  The patient will be proceeding with D&C on 9/19/2019.  We will check her counts on 9/17/2019 just prior to the procedure..     Plan:  1. Continue Aromasin 25 mg daily.    2. Continue Ibrance 100 mg daily for 21/28 days (initiated 8/26/2019).  3. Continue oral folic acid 1 mg daily  4. Administer 1 L normal saline today  5. In 2 days CBC, CMP and RN review with potential additional IV fluids  6. In 1 week CBC, CMP and RN review  7. In 2 weeks MD visit with CBC, CMP, CA 15-3.  Patient will be ready to begin cycle  2 Aromasin and Ibrance.  We will plan repeat CT scan at the end of that cycle.

## 2019-09-12 ENCOUNTER — LAB (OUTPATIENT)
Dept: LAB | Facility: HOSPITAL | Age: 59
End: 2019-09-12

## 2019-09-12 ENCOUNTER — INFUSION (OUTPATIENT)
Dept: ONCOLOGY | Facility: HOSPITAL | Age: 59
End: 2019-09-12

## 2019-09-12 ENCOUNTER — CLINICAL SUPPORT (OUTPATIENT)
Dept: ONCOLOGY | Facility: HOSPITAL | Age: 59
End: 2019-09-12

## 2019-09-12 VITALS
OXYGEN SATURATION: 98 % | TEMPERATURE: 97.9 F | BODY MASS INDEX: 44.41 KG/M2 | RESPIRATION RATE: 16 BRPM | SYSTOLIC BLOOD PRESSURE: 167 MMHG | DIASTOLIC BLOOD PRESSURE: 70 MMHG | WEIGHT: 293 LBS | HEART RATE: 72 BPM | HEIGHT: 68 IN

## 2019-09-12 DIAGNOSIS — R79.89 ELEVATED SERUM CREATININE: ICD-10-CM

## 2019-09-12 DIAGNOSIS — C50.919 METASTATIC BREAST CANCER: ICD-10-CM

## 2019-09-12 DIAGNOSIS — R79.89 ELEVATED SERUM CREATININE: Primary | ICD-10-CM

## 2019-09-12 LAB
ALBUMIN SERPL-MCNC: 3.9 G/DL (ref 3.5–5.2)
ALBUMIN/GLOB SERPL: 1 G/DL (ref 1.1–2.4)
ALP SERPL-CCNC: 77 U/L (ref 38–116)
ALT SERPL W P-5'-P-CCNC: 5 U/L (ref 0–33)
ANION GAP SERPL CALCULATED.3IONS-SCNC: 12.6 MMOL/L (ref 5–15)
AST SERPL-CCNC: 9 U/L (ref 0–32)
BASOPHILS # BLD AUTO: 0.03 10*3/MM3 (ref 0–0.2)
BASOPHILS NFR BLD AUTO: 1 % (ref 0–1.5)
BILIRUB SERPL-MCNC: 0.6 MG/DL (ref 0.2–1.2)
BUN BLD-MCNC: 33 MG/DL (ref 6–20)
BUN/CREAT SERPL: 14.7 (ref 7.3–30)
CALCIUM SPEC-SCNC: 8.8 MG/DL (ref 8.5–10.2)
CHLORIDE SERPL-SCNC: 105 MMOL/L (ref 98–107)
CO2 SERPL-SCNC: 25.4 MMOL/L (ref 22–29)
CREAT BLD-MCNC: 2.25 MG/DL (ref 0.6–1.1)
DEPRECATED RDW RBC AUTO: 51 FL (ref 37–54)
EOSINOPHIL # BLD AUTO: 0.11 10*3/MM3 (ref 0–0.4)
EOSINOPHIL NFR BLD AUTO: 3.6 % (ref 0.3–6.2)
ERYTHROCYTE [DISTWIDTH] IN BLOOD BY AUTOMATED COUNT: 15.8 % (ref 12.3–15.4)
GFR SERPL CREATININE-BSD FRML MDRD: 22 ML/MIN/1.73
GLOBULIN UR ELPH-MCNC: 3.9 GM/DL (ref 1.8–3.5)
GLUCOSE BLD-MCNC: 117 MG/DL (ref 74–124)
HCT VFR BLD AUTO: 31.7 % (ref 34–46.6)
HGB BLD-MCNC: 9.7 G/DL (ref 12–15.9)
IMM GRANULOCYTES # BLD AUTO: 0.02 10*3/MM3 (ref 0–0.05)
IMM GRANULOCYTES NFR BLD AUTO: 0.6 % (ref 0–0.5)
LYMPHOCYTES # BLD AUTO: 0.91 10*3/MM3 (ref 0.7–3.1)
LYMPHOCYTES NFR BLD AUTO: 29.4 % (ref 19.6–45.3)
MCH RBC QN AUTO: 28.7 PG (ref 26.6–33)
MCHC RBC AUTO-ENTMCNC: 30.6 G/DL (ref 31.5–35.7)
MCV RBC AUTO: 93.8 FL (ref 79–97)
MONOCYTES # BLD AUTO: 0.14 10*3/MM3 (ref 0.1–0.9)
MONOCYTES NFR BLD AUTO: 4.5 % (ref 5–12)
NEUTROPHILS # BLD AUTO: 1.88 10*3/MM3 (ref 1.7–7)
NEUTROPHILS NFR BLD AUTO: 60.9 % (ref 42.7–76)
NRBC BLD AUTO-RTO: 0 /100 WBC (ref 0–0.2)
PLATELET # BLD AUTO: 179 10*3/MM3 (ref 140–450)
PMV BLD AUTO: 8.8 FL (ref 6–12)
POTASSIUM BLD-SCNC: 5 MMOL/L (ref 3.5–4.7)
PROT SERPL-MCNC: 7.8 G/DL (ref 6.3–8)
RBC # BLD AUTO: 3.38 10*6/MM3 (ref 3.77–5.28)
SODIUM BLD-SCNC: 143 MMOL/L (ref 134–145)
WBC NRBC COR # BLD: 3.09 10*3/MM3 (ref 3.4–10.8)

## 2019-09-12 PROCEDURE — 36415 COLL VENOUS BLD VENIPUNCTURE: CPT | Performed by: INTERNAL MEDICINE

## 2019-09-12 PROCEDURE — 85025 COMPLETE CBC W/AUTO DIFF WBC: CPT | Performed by: INTERNAL MEDICINE

## 2019-09-12 PROCEDURE — 96360 HYDRATION IV INFUSION INIT: CPT | Performed by: INTERNAL MEDICINE

## 2019-09-12 PROCEDURE — 80053 COMPREHEN METABOLIC PANEL: CPT | Performed by: INTERNAL MEDICINE

## 2019-09-12 RX ORDER — SODIUM CHLORIDE 9 MG/ML
1000 INJECTION, SOLUTION INTRAVENOUS ONCE
Status: CANCELLED | OUTPATIENT
Start: 2019-09-12

## 2019-09-12 RX ADMIN — SODIUM CHLORIDE 1000 ML: 900 INJECTION, SOLUTION INTRAVENOUS at 11:35

## 2019-09-12 NOTE — PROGRESS NOTES
Creat down to 2.25 this week.  Per Dr. Irvin, pt will need 1 L of NS.  Pt notified.  Has F/u appt next week to recheck levels.  Charge RN notified adding on for fluids.

## 2019-09-16 ENCOUNTER — APPOINTMENT (OUTPATIENT)
Dept: PREADMISSION TESTING | Facility: HOSPITAL | Age: 59
End: 2019-09-16

## 2019-09-16 VITALS
TEMPERATURE: 98.2 F | OXYGEN SATURATION: 94 % | HEART RATE: 75 BPM | HEIGHT: 67 IN | BODY MASS INDEX: 45.99 KG/M2 | DIASTOLIC BLOOD PRESSURE: 49 MMHG | WEIGHT: 293 LBS | RESPIRATION RATE: 18 BRPM | SYSTOLIC BLOOD PRESSURE: 136 MMHG

## 2019-09-16 RX ORDER — CARVEDILOL 3.12 MG/1
3.12 TABLET ORAL 2 TIMES DAILY WITH MEALS
COMMUNITY
End: 2020-03-13

## 2019-09-17 ENCOUNTER — LAB (OUTPATIENT)
Dept: LAB | Facility: HOSPITAL | Age: 59
End: 2019-09-17

## 2019-09-17 ENCOUNTER — CLINICAL SUPPORT (OUTPATIENT)
Dept: ONCOLOGY | Facility: HOSPITAL | Age: 59
End: 2019-09-17

## 2019-09-17 VITALS — TEMPERATURE: 97.5 F

## 2019-09-17 DIAGNOSIS — R79.89 ELEVATED SERUM CREATININE: Primary | ICD-10-CM

## 2019-09-17 DIAGNOSIS — R79.89 ELEVATED SERUM CREATININE: ICD-10-CM

## 2019-09-17 DIAGNOSIS — C50.919 METASTATIC BREAST CANCER: ICD-10-CM

## 2019-09-17 LAB
ALBUMIN SERPL-MCNC: 3.7 G/DL (ref 3.5–5.2)
ALBUMIN/GLOB SERPL: 1 G/DL (ref 1.1–2.4)
ALP SERPL-CCNC: 77 U/L (ref 38–116)
ALT SERPL W P-5'-P-CCNC: 6 U/L (ref 0–33)
ANION GAP SERPL CALCULATED.3IONS-SCNC: 11.7 MMOL/L (ref 5–15)
AST SERPL-CCNC: 7 U/L (ref 0–32)
BASOPHILS # BLD MANUAL: 0.02 10*3/MM3 (ref 0–0.2)
BASOPHILS NFR BLD AUTO: 1 % (ref 0–1.5)
BILIRUB SERPL-MCNC: 0.6 MG/DL (ref 0.2–1.2)
BUN BLD-MCNC: 34 MG/DL (ref 6–20)
BUN/CREAT SERPL: 14.5 (ref 7.3–30)
CALCIUM SPEC-SCNC: 8.6 MG/DL (ref 8.5–10.2)
CANCER AG15-3 SERPL-ACNC: 26.3 U/ML
CHLORIDE SERPL-SCNC: 104 MMOL/L (ref 98–107)
CO2 SERPL-SCNC: 25.3 MMOL/L (ref 22–29)
CREAT BLD-MCNC: 2.34 MG/DL (ref 0.6–1.1)
DEPRECATED RDW RBC AUTO: 55.2 FL (ref 37–54)
EOSINOPHIL # BLD MANUAL: 0.05 10*3/MM3 (ref 0–0.4)
EOSINOPHIL NFR BLD MANUAL: 2 % (ref 0.3–6.2)
ERYTHROCYTE [DISTWIDTH] IN BLOOD BY AUTOMATED COUNT: 16.6 % (ref 12.3–15.4)
GFR SERPL CREATININE-BSD FRML MDRD: 21 ML/MIN/1.73
GLOBULIN UR ELPH-MCNC: 3.8 GM/DL (ref 1.8–3.5)
GLUCOSE BLD-MCNC: 197 MG/DL (ref 74–124)
HCT VFR BLD AUTO: 30.6 % (ref 34–46.6)
HGB BLD-MCNC: 9.5 G/DL (ref 12–15.9)
LYMPHOCYTES # BLD MANUAL: 1.1 10*3/MM3 (ref 0.7–3.1)
LYMPHOCYTES NFR BLD MANUAL: 1 % (ref 5–12)
LYMPHOCYTES NFR BLD MANUAL: 44 % (ref 19.6–45.3)
MCH RBC QN AUTO: 29.2 PG (ref 26.6–33)
MCHC RBC AUTO-ENTMCNC: 31 G/DL (ref 31.5–35.7)
MCV RBC AUTO: 94.2 FL (ref 79–97)
MONOCYTES # BLD AUTO: 0.02 10*3/MM3 (ref 0.1–0.9)
NEUTROPHILS # BLD AUTO: 1.25 10*3/MM3 (ref 1.7–7)
NEUTROPHILS NFR BLD MANUAL: 50 % (ref 42.7–76)
PLAT MORPH BLD: NORMAL
PLATELET # BLD AUTO: 140 10*3/MM3 (ref 140–450)
PMV BLD AUTO: 9.3 FL (ref 6–12)
POTASSIUM BLD-SCNC: 5.4 MMOL/L (ref 3.5–4.7)
PROT SERPL-MCNC: 7.5 G/DL (ref 6.3–8)
RBC # BLD AUTO: 3.25 10*6/MM3 (ref 3.77–5.28)
RBC MORPH BLD: NORMAL
SODIUM BLD-SCNC: 141 MMOL/L (ref 134–145)
VARIANT LYMPHS NFR BLD MANUAL: 2 % (ref 0–5)
WBC MORPH BLD: NORMAL
WBC NRBC COR # BLD: 2.49 10*3/MM3 (ref 3.4–10.8)

## 2019-09-17 PROCEDURE — 85007 BL SMEAR W/DIFF WBC COUNT: CPT | Performed by: INTERNAL MEDICINE

## 2019-09-17 PROCEDURE — 36415 COLL VENOUS BLD VENIPUNCTURE: CPT | Performed by: INTERNAL MEDICINE

## 2019-09-17 PROCEDURE — 86300 IMMUNOASSAY TUMOR CA 15-3: CPT | Performed by: INTERNAL MEDICINE

## 2019-09-17 PROCEDURE — 80053 COMPREHEN METABOLIC PANEL: CPT | Performed by: INTERNAL MEDICINE

## 2019-09-17 PROCEDURE — 85025 COMPLETE CBC W/AUTO DIFF WBC: CPT | Performed by: INTERNAL MEDICINE

## 2019-09-17 NOTE — PROGRESS NOTES
CBC and CMP results reviewed with pt. Creat 2.34. ANC down today to 1.25. Pt voices no concerns. Denies any recent nausea, vomiting, or diarrhea. States she is eating and drinking well. Pt also denies any fever, chills, or other sign of infection. Pt states she is due for a D&C with biopsy on 9/19/19. Reviewed with Dr. Irvin. Per Dr. Irvin, informed pt she is ok to proceed with D&C and biopsy on 9/19/19. Renal ultrasound ordered and pt scheduled to f/u with nephrologist. No fluids today per Dr. Irvin. Pt v/u to all instructions. Pt sent to appt desk to schedule US. Pt will return as scheduled to see Dr. Irvin next week.

## 2019-09-19 ENCOUNTER — ANESTHESIA (OUTPATIENT)
Dept: PERIOP | Facility: HOSPITAL | Age: 59
End: 2019-09-19

## 2019-09-19 ENCOUNTER — ANESTHESIA EVENT (OUTPATIENT)
Dept: PERIOP | Facility: HOSPITAL | Age: 59
End: 2019-09-19

## 2019-09-19 ENCOUNTER — HOSPITAL ENCOUNTER (OUTPATIENT)
Facility: HOSPITAL | Age: 59
Setting detail: HOSPITAL OUTPATIENT SURGERY
Discharge: HOME | End: 2019-09-19
Attending: OBSTETRICS & GYNECOLOGY | Admitting: OBSTETRICS & GYNECOLOGY

## 2019-09-19 VITALS
DIASTOLIC BLOOD PRESSURE: 98 MMHG | BODY MASS INDEX: 58.66 KG/M2 | OXYGEN SATURATION: 94 % | SYSTOLIC BLOOD PRESSURE: 118 MMHG | HEART RATE: 66 BPM | WEIGHT: 293 LBS | RESPIRATION RATE: 16 BRPM | TEMPERATURE: 98.1 F

## 2019-09-19 DIAGNOSIS — N80.9 ENDOMETRIOSIS: ICD-10-CM

## 2019-09-19 LAB
GLUCOSE BLDC GLUCOMTR-MCNC: 117 MG/DL (ref 70–130)
GLUCOSE BLDC GLUCOMTR-MCNC: 136 MG/DL (ref 70–130)

## 2019-09-19 PROCEDURE — 25010000002 SUCCINYLCHOLINE PER 20 MG: Performed by: NURSE ANESTHETIST, CERTIFIED REGISTERED

## 2019-09-19 PROCEDURE — 88305 TISSUE EXAM BY PATHOLOGIST: CPT | Performed by: OBSTETRICS & GYNECOLOGY

## 2019-09-19 PROCEDURE — 82962 GLUCOSE BLOOD TEST: CPT

## 2019-09-19 PROCEDURE — 25010000002 FENTANYL CITRATE (PF) 100 MCG/2ML SOLUTION: Performed by: NURSE ANESTHETIST, CERTIFIED REGISTERED

## 2019-09-19 PROCEDURE — 25010000002 ROPIVACAINE PER 1 MG: Performed by: OBSTETRICS & GYNECOLOGY

## 2019-09-19 PROCEDURE — 25010000002 PROPOFOL 10 MG/ML EMULSION: Performed by: NURSE ANESTHETIST, CERTIFIED REGISTERED

## 2019-09-19 PROCEDURE — 25010000002 ONDANSETRON PER 1 MG: Performed by: NURSE ANESTHETIST, CERTIFIED REGISTERED

## 2019-09-19 PROCEDURE — 25010000002 MIDAZOLAM PER 1 MG: Performed by: ANESTHESIOLOGY

## 2019-09-19 RX ORDER — EPHEDRINE SULFATE 50 MG/ML
5 INJECTION, SOLUTION INTRAVENOUS ONCE AS NEEDED
Status: DISCONTINUED | OUTPATIENT
Start: 2019-09-19 | End: 2019-09-19 | Stop reason: HOSPADM

## 2019-09-19 RX ORDER — HYDROMORPHONE HYDROCHLORIDE 1 MG/ML
0.5 INJECTION, SOLUTION INTRAMUSCULAR; INTRAVENOUS; SUBCUTANEOUS
Status: DISCONTINUED | OUTPATIENT
Start: 2019-09-19 | End: 2019-09-19 | Stop reason: HOSPADM

## 2019-09-19 RX ORDER — HYDROCODONE BITARTRATE AND ACETAMINOPHEN 7.5; 325 MG/1; MG/1
1 TABLET ORAL ONCE AS NEEDED
Status: DISCONTINUED | OUTPATIENT
Start: 2019-09-19 | End: 2019-09-19 | Stop reason: HOSPADM

## 2019-09-19 RX ORDER — FENTANYL CITRATE 50 UG/ML
50 INJECTION, SOLUTION INTRAMUSCULAR; INTRAVENOUS
Status: DISCONTINUED | OUTPATIENT
Start: 2019-09-19 | End: 2019-09-19 | Stop reason: HOSPADM

## 2019-09-19 RX ORDER — ACETAMINOPHEN 325 MG/1
650 TABLET ORAL ONCE AS NEEDED
Status: DISCONTINUED | OUTPATIENT
Start: 2019-09-19 | End: 2019-09-19 | Stop reason: HOSPADM

## 2019-09-19 RX ORDER — MIDAZOLAM HYDROCHLORIDE 1 MG/ML
2 INJECTION INTRAMUSCULAR; INTRAVENOUS
Status: DISCONTINUED | OUTPATIENT
Start: 2019-09-19 | End: 2019-09-19 | Stop reason: HOSPADM

## 2019-09-19 RX ORDER — MIDAZOLAM HYDROCHLORIDE 1 MG/ML
1 INJECTION INTRAMUSCULAR; INTRAVENOUS
Status: DISCONTINUED | OUTPATIENT
Start: 2019-09-19 | End: 2019-09-19 | Stop reason: HOSPADM

## 2019-09-19 RX ORDER — SUCCINYLCHOLINE CHLORIDE 20 MG/ML
INJECTION INTRAMUSCULAR; INTRAVENOUS AS NEEDED
Status: DISCONTINUED | OUTPATIENT
Start: 2019-09-19 | End: 2019-09-19 | Stop reason: SURG

## 2019-09-19 RX ORDER — PROPOFOL 10 MG/ML
VIAL (ML) INTRAVENOUS AS NEEDED
Status: DISCONTINUED | OUTPATIENT
Start: 2019-09-19 | End: 2019-09-19 | Stop reason: SURG

## 2019-09-19 RX ORDER — SODIUM CHLORIDE, SODIUM LACTATE, POTASSIUM CHLORIDE, CALCIUM CHLORIDE 600; 310; 30; 20 MG/100ML; MG/100ML; MG/100ML; MG/100ML
9 INJECTION, SOLUTION INTRAVENOUS CONTINUOUS
Status: DISCONTINUED | OUTPATIENT
Start: 2019-09-19 | End: 2019-09-19 | Stop reason: HOSPADM

## 2019-09-19 RX ORDER — ONDANSETRON 2 MG/ML
INJECTION INTRAMUSCULAR; INTRAVENOUS AS NEEDED
Status: DISCONTINUED | OUTPATIENT
Start: 2019-09-19 | End: 2019-09-19 | Stop reason: SURG

## 2019-09-19 RX ORDER — SODIUM CHLORIDE 0.9 % (FLUSH) 0.9 %
3-10 SYRINGE (ML) INJECTION AS NEEDED
Status: DISCONTINUED | OUTPATIENT
Start: 2019-09-19 | End: 2019-09-19 | Stop reason: HOSPADM

## 2019-09-19 RX ORDER — PROMETHAZINE HYDROCHLORIDE 25 MG/ML
12.5 INJECTION, SOLUTION INTRAMUSCULAR; INTRAVENOUS ONCE AS NEEDED
Status: DISCONTINUED | OUTPATIENT
Start: 2019-09-19 | End: 2019-09-19 | Stop reason: HOSPADM

## 2019-09-19 RX ORDER — FENTANYL CITRATE 50 UG/ML
INJECTION, SOLUTION INTRAMUSCULAR; INTRAVENOUS AS NEEDED
Status: DISCONTINUED | OUTPATIENT
Start: 2019-09-19 | End: 2019-09-19 | Stop reason: SURG

## 2019-09-19 RX ORDER — MAGNESIUM HYDROXIDE 1200 MG/15ML
LIQUID ORAL AS NEEDED
Status: DISCONTINUED | OUTPATIENT
Start: 2019-09-19 | End: 2019-09-19 | Stop reason: HOSPADM

## 2019-09-19 RX ORDER — DIPHENHYDRAMINE HYDROCHLORIDE 50 MG/ML
12.5 INJECTION INTRAMUSCULAR; INTRAVENOUS
Status: DISCONTINUED | OUTPATIENT
Start: 2019-09-19 | End: 2019-09-19 | Stop reason: HOSPADM

## 2019-09-19 RX ORDER — HYDROCODONE BITARTRATE AND ACETAMINOPHEN 5; 325 MG/1; MG/1
1 TABLET ORAL ONCE AS NEEDED
Status: DISCONTINUED | OUTPATIENT
Start: 2019-09-19 | End: 2019-09-19 | Stop reason: HOSPADM

## 2019-09-19 RX ORDER — ACETAMINOPHEN 650 MG/1
650 SUPPOSITORY RECTAL ONCE AS NEEDED
Status: DISCONTINUED | OUTPATIENT
Start: 2019-09-19 | End: 2019-09-19 | Stop reason: HOSPADM

## 2019-09-19 RX ORDER — NALOXONE HCL 0.4 MG/ML
0.2 VIAL (ML) INJECTION AS NEEDED
Status: DISCONTINUED | OUTPATIENT
Start: 2019-09-19 | End: 2019-09-19 | Stop reason: HOSPADM

## 2019-09-19 RX ORDER — LIDOCAINE HYDROCHLORIDE 10 MG/ML
0.5 INJECTION, SOLUTION EPIDURAL; INFILTRATION; INTRACAUDAL; PERINEURAL ONCE AS NEEDED
Status: DISCONTINUED | OUTPATIENT
Start: 2019-09-19 | End: 2019-09-19 | Stop reason: HOSPADM

## 2019-09-19 RX ORDER — PROMETHAZINE HYDROCHLORIDE 25 MG/1
25 SUPPOSITORY RECTAL ONCE AS NEEDED
Status: DISCONTINUED | OUTPATIENT
Start: 2019-09-19 | End: 2019-09-19 | Stop reason: HOSPADM

## 2019-09-19 RX ORDER — FAMOTIDINE 10 MG/ML
20 INJECTION, SOLUTION INTRAVENOUS ONCE
Status: COMPLETED | OUTPATIENT
Start: 2019-09-19 | End: 2019-09-19

## 2019-09-19 RX ORDER — HYDRALAZINE HYDROCHLORIDE 20 MG/ML
5 INJECTION INTRAMUSCULAR; INTRAVENOUS
Status: DISCONTINUED | OUTPATIENT
Start: 2019-09-19 | End: 2019-09-19 | Stop reason: HOSPADM

## 2019-09-19 RX ORDER — SODIUM CHLORIDE 0.9 % (FLUSH) 0.9 %
3 SYRINGE (ML) INJECTION EVERY 12 HOURS SCHEDULED
Status: DISCONTINUED | OUTPATIENT
Start: 2019-09-19 | End: 2019-09-19 | Stop reason: HOSPADM

## 2019-09-19 RX ORDER — ROPIVACAINE HYDROCHLORIDE 5 MG/ML
INJECTION, SOLUTION EPIDURAL; INFILTRATION; PERINEURAL AS NEEDED
Status: DISCONTINUED | OUTPATIENT
Start: 2019-09-19 | End: 2019-09-19 | Stop reason: HOSPADM

## 2019-09-19 RX ORDER — PROMETHAZINE HYDROCHLORIDE 25 MG/1
25 TABLET ORAL ONCE AS NEEDED
Status: DISCONTINUED | OUTPATIENT
Start: 2019-09-19 | End: 2019-09-19 | Stop reason: HOSPADM

## 2019-09-19 RX ORDER — SCOLOPAMINE TRANSDERMAL SYSTEM 1 MG/1
1 PATCH, EXTENDED RELEASE TRANSDERMAL
Status: DISCONTINUED | OUTPATIENT
Start: 2019-09-19 | End: 2019-09-19 | Stop reason: HOSPADM

## 2019-09-19 RX ORDER — DIPHENHYDRAMINE HCL 25 MG
25 CAPSULE ORAL
Status: DISCONTINUED | OUTPATIENT
Start: 2019-09-19 | End: 2019-09-19 | Stop reason: HOSPADM

## 2019-09-19 RX ORDER — OXYCODONE AND ACETAMINOPHEN 7.5; 325 MG/1; MG/1
1 TABLET ORAL ONCE AS NEEDED
Status: DISCONTINUED | OUTPATIENT
Start: 2019-09-19 | End: 2019-09-19 | Stop reason: HOSPADM

## 2019-09-19 RX ORDER — LIDOCAINE HYDROCHLORIDE 20 MG/ML
INJECTION, SOLUTION INFILTRATION; PERINEURAL AS NEEDED
Status: DISCONTINUED | OUTPATIENT
Start: 2019-09-19 | End: 2019-09-19 | Stop reason: SURG

## 2019-09-19 RX ORDER — PROMETHAZINE HYDROCHLORIDE 25 MG/ML
6.25 INJECTION, SOLUTION INTRAMUSCULAR; INTRAVENOUS
Status: DISCONTINUED | OUTPATIENT
Start: 2019-09-19 | End: 2019-09-19 | Stop reason: HOSPADM

## 2019-09-19 RX ORDER — ONDANSETRON 2 MG/ML
4 INJECTION INTRAMUSCULAR; INTRAVENOUS ONCE AS NEEDED
Status: DISCONTINUED | OUTPATIENT
Start: 2019-09-19 | End: 2019-09-19 | Stop reason: HOSPADM

## 2019-09-19 RX ORDER — FLUMAZENIL 0.1 MG/ML
0.2 INJECTION INTRAVENOUS AS NEEDED
Status: DISCONTINUED | OUTPATIENT
Start: 2019-09-19 | End: 2019-09-19 | Stop reason: HOSPADM

## 2019-09-19 RX ORDER — LABETALOL HYDROCHLORIDE 5 MG/ML
5 INJECTION, SOLUTION INTRAVENOUS
Status: DISCONTINUED | OUTPATIENT
Start: 2019-09-19 | End: 2019-09-19 | Stop reason: HOSPADM

## 2019-09-19 RX ADMIN — MIDAZOLAM 1 MG: 1 INJECTION INTRAMUSCULAR; INTRAVENOUS at 12:25

## 2019-09-19 RX ADMIN — ONDANSETRON 4 MG: 2 INJECTION INTRAMUSCULAR; INTRAVENOUS at 13:33

## 2019-09-19 RX ADMIN — FENTANYL CITRATE 100 MCG: 50 INJECTION INTRAMUSCULAR; INTRAVENOUS at 13:21

## 2019-09-19 RX ADMIN — PROPOFOL 200 MG: 10 INJECTION, EMULSION INTRAVENOUS at 13:21

## 2019-09-19 RX ADMIN — FAMOTIDINE 20 MG: 10 INJECTION, SOLUTION INTRAVENOUS at 12:19

## 2019-09-19 RX ADMIN — LIDOCAINE HYDROCHLORIDE 60 MG: 20 INJECTION, SOLUTION INFILTRATION; PERINEURAL at 13:21

## 2019-09-19 RX ADMIN — SCOPALAMINE 1 PATCH: 1 PATCH, EXTENDED RELEASE TRANSDERMAL at 12:19

## 2019-09-19 RX ADMIN — FENTANYL CITRATE 50 MCG: 50 INJECTION INTRAMUSCULAR; INTRAVENOUS at 14:02

## 2019-09-19 RX ADMIN — SUCCINYLCHOLINE CHLORIDE 160 MG: 20 INJECTION, SOLUTION INTRAMUSCULAR; INTRAVENOUS at 13:21

## 2019-09-19 RX ADMIN — SODIUM CHLORIDE, POTASSIUM CHLORIDE, SODIUM LACTATE AND CALCIUM CHLORIDE 9 ML/HR: 600; 310; 30; 20 INJECTION, SOLUTION INTRAVENOUS at 12:18

## 2019-09-19 NOTE — OP NOTE
PREOPERATIVE DIAGNOSIS: Thickened endometrium.    POSTOPERATIVE DIAGNOSIS: Thickened endometrium.    PROCEDURES PERFORMED:  1.  Dilatation and curettage.  2.  Hysteroscopy.    SURGEON: Cherie Oliveros MD    ANESTHESIA: General.    DESCRIPTION OF PROCEDURE: Under adequate anesthesia, the patient was placed in the dorsal lithotomy position, prepped and draped in usual sterile fashion. Examination revealed no masses. The speculum was placed. The anterior lip of the cervix was grasped with a single-tooth tenaculum. The cervix was progressively dilated. The hysteroscope was then inserted and all areas of the uterine cavity could be seen with no evidence of lesions or polyps. The sharp curette was then used to curette all areas of the uterine cavity providing a scant amount of tissue. This tissue will be sent to Pathology. At the end of the procedure, approximately 10 mL of Naropin was placed, 5 mL of Naropin at the 4 o'clock position of the cervix and 5 mL at the 8 o'clock position of the cervix. The patient tolerated the procedure well. Estimated blood loss was minimal. She will be brought to recovery room in stable condition.

## 2019-09-19 NOTE — ANESTHESIA PREPROCEDURE EVALUATION
Anesthesia Evaluation     Patient summary reviewed and Nursing notes reviewed                Airway   Mallampati: III  Large neck circumference and Possible difficult intubation  Dental      Pulmonary    (+) sleep apnea on CPAP,   Cardiovascular     ECG reviewed  Rhythm: regular  Rate: normal    (+) hypertension, valvular problems/murmurs murmur, AS and MR, hyperlipidemia,       Neuro/Psych  (+) tremors, weakness, psychiatric history Anxiety and Depression,     GI/Hepatic/Renal/Endo    (+) morbid obesity,  renal disease CRI, diabetes mellitus type 2, hypothyroidism, hyperthyroidism    Musculoskeletal     Abdominal    Substance History - negative use     OB/GYN negative ob/gyn ROS         Other   (+) arthritis   history of cancer                    Anesthesia Plan    ASA 4     general     intravenous induction   Anesthetic plan, all risks, benefits, and alternatives have been provided, discussed and informed consent has been obtained with: patient.

## 2019-09-19 NOTE — ANESTHESIA POSTPROCEDURE EVALUATION
Patient: Juana Hall    Procedure Summary     Date:  09/19/19 Room / Location:   [facility]    Anesthesia Start:  1315 Anesthesia Stop:  1422    Procedure:  DILATATION AND CURETTAGE HYSTEROSCOPY/POLYPECTOMY (N/A Uterus) Diagnosis:      Surgeon:  Cherie Oliveros MD Provider:  Jerson, Sherif Tatum MD    Anesthesia Type:  general ASA Status:  4          Anesthesia Type: general  Last vitals  BP   118/98 (09/19/19 1448)   Temp   36.7 °C (98.1 °F) (09/19/19 1441)   Pulse   66 (09/19/19 1448)   Resp   16 (09/19/19 1448)     SpO2   94 % (09/19/19 1448)     Post Anesthesia Care and Evaluation    Patient location during evaluation: bedside  Patient participation: complete - patient participated  Level of consciousness: awake and alert  Pain management: adequate  Airway patency: patent  Anesthetic complications: No anesthetic complications    Cardiovascular status: acceptable  Respiratory status: acceptable  Hydration status: acceptable    Comments: /98 (BP Location: Right arm, Patient Position: Lying)   Pulse 66   Temp 36.7 °C (98.1 °F) (Oral)   Resp 16   Wt (!) 170 kg (374 lb 9 oz)   LMP  (LMP Unknown)   SpO2 94%   BMI 58.66 kg/m²

## 2019-09-19 NOTE — ANESTHESIA PROCEDURE NOTES
Airway  Urgency: elective    Date/Time: 9/19/2019 1:25 PM  Airway not difficult    General Information and Staff    Patient location during procedure: OR  Anesthesiologist: Sherif Arthur MD  CRNA: Kathrine Palafox CRNA    Indications and Patient Condition  Indications for airway management: airway protection    Preoxygenated: yes  Mask difficulty assessment: 1 - vent by mask    Final Airway Details  Final airway type: endotracheal airway      Successful airway: ETT  Cuffed: yes   Successful intubation technique: direct laryngoscopy  Facilitating devices/methods: intubating stylet  Endotracheal tube insertion site: oral  Blade: Parisi  Blade size: 2  ETT size (mm): 7.0  Cormack-Lehane Classification: grade I - full view of glottis  Placement verified by: chest auscultation and capnometry   Measured from: lips  Number of attempts at approach: 1  Assessment: lips, teeth, and gum same as pre-op and atraumatic intubation    Additional Comments  Atraumatic, MOP to cuff, BSBE, no change to dentition, secured with tape

## 2019-09-20 ENCOUNTER — HOSPITAL ENCOUNTER (OUTPATIENT)
Dept: ULTRASOUND IMAGING | Facility: HOSPITAL | Age: 59
Discharge: HOME | End: 2019-09-20
Admitting: INTERNAL MEDICINE

## 2019-09-20 DIAGNOSIS — R79.89 ELEVATED SERUM CREATININE: ICD-10-CM

## 2019-09-20 PROCEDURE — 76775 US EXAM ABDO BACK WALL LIM: CPT

## 2019-09-21 LAB
CYTO UR: NORMAL
LAB AP CASE REPORT: NORMAL
PATH REPORT.FINAL DX SPEC: NORMAL
PATH REPORT.GROSS SPEC: NORMAL

## 2019-09-24 ENCOUNTER — APPOINTMENT (OUTPATIENT)
Dept: LAB | Facility: HOSPITAL | Age: 59
End: 2019-09-24

## 2019-09-24 ENCOUNTER — OFFICE VISIT (OUTPATIENT)
Dept: ONCOLOGY | Facility: CLINIC | Age: 59
End: 2019-09-24

## 2019-09-24 ENCOUNTER — APPOINTMENT (OUTPATIENT)
Dept: ONCOLOGY | Facility: CLINIC | Age: 59
End: 2019-09-24

## 2019-09-24 ENCOUNTER — LAB (OUTPATIENT)
Dept: LAB | Facility: HOSPITAL | Age: 59
End: 2019-09-24

## 2019-09-24 VITALS
WEIGHT: 293 LBS | BODY MASS INDEX: 44.41 KG/M2 | HEIGHT: 68 IN | HEART RATE: 82 BPM | DIASTOLIC BLOOD PRESSURE: 76 MMHG | OXYGEN SATURATION: 92 % | TEMPERATURE: 98.5 F | RESPIRATION RATE: 16 BRPM | SYSTOLIC BLOOD PRESSURE: 128 MMHG

## 2019-09-24 DIAGNOSIS — C50.919 METASTATIC BREAST CANCER: Primary | ICD-10-CM

## 2019-09-24 DIAGNOSIS — C50.919 METASTATIC BREAST CANCER: ICD-10-CM

## 2019-09-24 LAB
ALBUMIN SERPL-MCNC: 4 G/DL (ref 3.5–5.2)
ALBUMIN/GLOB SERPL: 1.1 G/DL (ref 1.1–2.4)
ALP SERPL-CCNC: 78 U/L (ref 38–116)
ALT SERPL W P-5'-P-CCNC: 7 U/L (ref 0–33)
ANION GAP SERPL CALCULATED.3IONS-SCNC: 9.9 MMOL/L (ref 5–15)
AST SERPL-CCNC: 11 U/L (ref 0–32)
BASOPHILS # BLD AUTO: 0.05 10*3/MM3 (ref 0–0.2)
BASOPHILS NFR BLD AUTO: 1.3 % (ref 0–1.5)
BILIRUB SERPL-MCNC: 0.6 MG/DL (ref 0.2–1.2)
BUN BLD-MCNC: 29 MG/DL (ref 6–20)
BUN/CREAT SERPL: 14.4 (ref 7.3–30)
CALCIUM SPEC-SCNC: 9.1 MG/DL (ref 8.5–10.2)
CHLORIDE SERPL-SCNC: 103 MMOL/L (ref 98–107)
CO2 SERPL-SCNC: 28.1 MMOL/L (ref 22–29)
CREAT BLD-MCNC: 2.02 MG/DL (ref 0.6–1.1)
DEPRECATED RDW RBC AUTO: 60.1 FL (ref 37–54)
EOSINOPHIL # BLD AUTO: 0.09 10*3/MM3 (ref 0–0.4)
EOSINOPHIL NFR BLD AUTO: 2.3 % (ref 0.3–6.2)
ERYTHROCYTE [DISTWIDTH] IN BLOOD BY AUTOMATED COUNT: 17.4 % (ref 12.3–15.4)
GFR SERPL CREATININE-BSD FRML MDRD: 25 ML/MIN/1.73
GLOBULIN UR ELPH-MCNC: 3.7 GM/DL (ref 1.8–3.5)
GLUCOSE BLD-MCNC: 113 MG/DL (ref 74–124)
HCT VFR BLD AUTO: 32 % (ref 34–46.6)
HGB BLD-MCNC: 9.9 G/DL (ref 12–15.9)
IMM GRANULOCYTES # BLD AUTO: 0.02 10*3/MM3 (ref 0–0.05)
IMM GRANULOCYTES NFR BLD AUTO: 0.5 % (ref 0–0.5)
LYMPHOCYTES # BLD AUTO: 1.32 10*3/MM3 (ref 0.7–3.1)
LYMPHOCYTES NFR BLD AUTO: 33.8 % (ref 19.6–45.3)
MCH RBC QN AUTO: 29.4 PG (ref 26.6–33)
MCHC RBC AUTO-ENTMCNC: 30.9 G/DL (ref 31.5–35.7)
MCV RBC AUTO: 95 FL (ref 79–97)
MONOCYTES # BLD AUTO: 0.5 10*3/MM3 (ref 0.1–0.9)
MONOCYTES NFR BLD AUTO: 12.8 % (ref 5–12)
NEUTROPHILS # BLD AUTO: 1.93 10*3/MM3 (ref 1.7–7)
NEUTROPHILS NFR BLD AUTO: 49.3 % (ref 42.7–76)
NRBC BLD AUTO-RTO: 0 /100 WBC (ref 0–0.2)
PLATELET # BLD AUTO: 203 10*3/MM3 (ref 140–450)
PMV BLD AUTO: 8.7 FL (ref 6–12)
POTASSIUM BLD-SCNC: 5.4 MMOL/L (ref 3.5–4.7)
PROT SERPL-MCNC: 7.7 G/DL (ref 6.3–8)
RBC # BLD AUTO: 3.37 10*6/MM3 (ref 3.77–5.28)
SODIUM BLD-SCNC: 141 MMOL/L (ref 134–145)
WBC NRBC COR # BLD: 3.91 10*3/MM3 (ref 3.4–10.8)

## 2019-09-24 PROCEDURE — 80053 COMPREHEN METABOLIC PANEL: CPT | Performed by: INTERNAL MEDICINE

## 2019-09-24 PROCEDURE — 36415 COLL VENOUS BLD VENIPUNCTURE: CPT | Performed by: INTERNAL MEDICINE

## 2019-09-24 PROCEDURE — G0463 HOSPITAL OUTPT CLINIC VISIT: HCPCS | Performed by: INTERNAL MEDICINE

## 2019-09-24 PROCEDURE — 99215 OFFICE O/P EST HI 40 MIN: CPT | Performed by: INTERNAL MEDICINE

## 2019-09-24 PROCEDURE — 85025 COMPLETE CBC W/AUTO DIFF WBC: CPT | Performed by: INTERNAL MEDICINE

## 2019-09-25 ENCOUNTER — TELEPHONE (OUTPATIENT)
Dept: ONCOLOGY | Facility: HOSPITAL | Age: 59
End: 2019-09-25

## 2019-09-25 NOTE — PROGRESS NOTES
Subjective .     REASONS FOR FOLLOWUP:    1. Metastatic lobular breast cancer (ER/LA positive, HER-2/tj negative):  · History of stage IIIC right breast cancer ([id number])  · Patient treated previously in the [facility] system  · Diagnosis via excisional biopsy 8/23/2003 with lobular carcinoma, 4.5 cm, positive margins.  ER/LA positive, HER-2/tj negative.  · Staging evaluation in September 2003 with negative bone scan and negative CT scans.  Ejection fraction 65%.  · Received neoadjuvant chemotherapy (? GET regimen) which apparently included Taxotere and possibly epirubicin.  Received 6 cycles.  · 1/22/2004 bilateral mastectomy with right axillary dissection.  Per available records, 10-12 cm residual invasive lobular carcinoma in the breast with 14/16 lymph nodes positive with extranodal extension.  Immediate reconstruction.  · Received adjuvant chemotherapy with carboplatin and navelbine x4 cycles  · Initiated adjuvant tamoxifen 6/22/2004  · Adjuvant radiation therapy initiated but interrupted due to chest wall cellulitis requiring removal of implants in August 2004  · Implant reconstruction again attempted with MRSA infection, implant removed 12/30/2005 with no further attempts made and reconstruction.  · Completed 5 years tamoxifen in June 2009 and subsequently transitioned to Femara.  Received Femara until June 2014.  · Patient experienced postmenopausal vaginal bleeding in 2013, negative endometrial biopsy and gynecologic evaluation.  · Patient last seen in [facility] 6/18/2014  · CT chest performed to evaluate bicuspid aortic valve and dilated asending aorta 7/12/2019.  CT showed no change in aorta however showed new free intraperitoneal fluid in the upper abdomen with mesenteric edema, concerning for carcinomatosis.  · CT abdomen and pelvis (without IV contrast) on 7/16/2019 with mesenteric thickening, small volume of complex fluid in the mesentery which was suspicious and possibly representative of  carcinomatosis, small amount of perihepatic fluid, small bowel loops tethered to omentum without obstruction, omental thickening, shotty retroperitoneal and pelvic lymph nodes (non-pathologically enlarged).  · PET scan 7/26/2019 with hypermetabolic omental activity, hypermetabolic small retroperitoneal lymph nodes, hypermetabolic activity throughout uterus with increase in size.  · CT-guided omental biopsy 7/30/2019 with metastatic lobular carcinoma of breast primary, ER positive (greater than 95%), RI positive (90%), HER-2/tj negative (1+ IHC)  · Baseline CA 15-3 on 7/22/19 was 32.6  · Initiation of Aromasin 25 mg daily 8/12/2019.  Initiated Ibrance on 8/26/2019 at 100 mg 21/28 days on 8/26/2019.  Plan repeat scans at the end of 2 cycles Ibrance  2. Anemia:  · Normocytic anemia, hemoglobin in 10-11 range  · Anemia evaluation 7/22/2019 with iron 30, ferritin 85.2, iron saturation 10%, TIBC 311, B12 370, erythropoietin level 20.4  · Anemia felt in large part to be related to CKD3 as well as to underlying malignancy  · Evidence of folate deficiency 8/12/2019 with level 3.84, initiated folic acid 1 mg daily  · If hemoglobin consistently below 10 consider use of Procrit      HISTORY OF PRESENT ILLNESS:  The patient is a 58 y.o. year old female who is here for follow-up with the above-mentioned history.    History of Present Illness   patient returns today in follow-up continuing on Aromasin 25 mg daily and Ibrance 100 mg daily for 21/28 days.  She initiated cycle 2 Ibrance yesterday, 9/23/2019.  In the interval, she has continued with frequent lab monitoring in regards to her creatinine which had increased above her previous baseline (1.6-2.0) up to 2.44 on 9/10/2019.  We did administer some IV fluids at that time.  She underwent renal ultrasound on 9/20/2019 which fortunately showed no evidence of obstructive uropathy.  She has made a conscious effort to improve her oral fluid intake.  Over time her creatinine has  gradually improved, currently down to 2.02.  She has tolerated Ibrance extremely well.  She notes some mild nausea however has not required any Zofran and has not experienced any emesis.  She notes that she has to eat small more frequent meals.  She has not experienced any significant abdominal bloating or pain.  She notes normal bowel function.  The patient did undergo D&C and hysteroscopy with Dr. Oliveros on 2019 with no significant findings intraoperatively and negative pathologic results.    Past Medical History:   Diagnosis Date   • Anemia    • Anxiety and depression    • Bicuspid aortic valve    • Breast cancer (CMS/HCC)     RIGHT, HAD NEELA. MASTECTOMY, CHEMO AND RADIATION   • CKD (chronic kidney disease)    • Diabetes mellitus (CMS/HCC)    • Frequent UTI    • Heart murmur    • History of kidney stones    • History of MRSA infection     POST BREAST SURGERY-TREATED [facility]   • History of sepsis     KIDNEY STONE   • Hyperlipidemia    • Hypertension    • Hyperthyroidism    • Hypothyroidism    • Lymphedema of leg    • Metastasis from breast cancer (CMS/HCC)     STOMACH-OMENTUM   • Neuromuscular disorder (CMS/HCC)    • Obesity    • ANDREW on CPAP    • Osteoarthrosis    • Radiculopathy    • Thickened endometrium    • Tremor      Past Surgical History:   Procedure Laterality Date   • BREAST RECONSTRUCTION      WITH IMPLANTS, AND REVISION, NOW REMOVED   • BREAST SURGERY     •  SECTION  1987   •  SECTION  1987   • D&C HYSTEROSCOPY N/A 2017    Procedure: DILATATION AND CURETTAGE, HYSTEROSCOPY ;  Surgeon: Cherie Oliveros MD;  [place] [facility] [place] [facility] [place]:    • D&C HYSTEROSCOPY N/A 2019    Procedure: DILATATION AND CURETTAGE HYSTEROSCOPY/POLYPECTOMY;  Surgeon: Cherie Oliveros MD;  [place] [facility] [place] [facility] [place]  Service: [place]necology   • LYMPH NODE BIOPSY     • MASTECTOMY Bilateral 2003   • MASTECTOMY Bilateral          ONCOLOGIC HISTORY:  (History from  previous dates can be found in the separate document.)    Current Outpatient Medications on File Prior to Visit   Medication Sig Dispense Refill   • amLODIPine (NORVASC) 10 MG tablet Take 10 mg by mouth Every Morning.     • aspirin 81 MG EC tablet Take 81 mg by mouth Daily. HELD FOR SURGERY     • atorvastatin (LIPITOR) 40 MG tablet Take 1 tablet by mouth Daily. 90 tablet 1   • carvedilol (COREG) 3.125 MG tablet Take 3.125 mg by mouth 2 (Two) Times a Day With Meals.     • Dulaglutide 1.5 MG/0.5ML solution pen-injector Inject 1.5 mg under the skin into the appropriate area as directed 1 (One) Time Per Week. MONDAYS 12 pen 1   • DULoxetine (CYMBALTA) 30 MG capsule Take 3 capsules by mouth Daily. 270 capsule 1   • exemestane (AROMASIN) 25 MG chemo tablet Take 1 tablet by mouth Daily. 30 tablet 4   • folic acid (FOLVITE) 1 MG tablet Take 1 tablet by mouth Daily. 30 tablet 3   • gabapentin (NEURONTIN) 300 MG capsule Take 1 capsule by mouth 4 (Four) Times a Day. 360 capsule 1   • LEVEMIR FLEXTOUCH 100 UNIT/ML injection Inject 50 Units under the skin into the appropriate area as directed 2 (Two) Times a Day. 3 pen 5   • levothyroxine (SYNTHROID, LEVOTHROID) 50 MCG tablet Take 1 tablet by mouth Daily. 90 tablet 1   • ondansetron (ZOFRAN) 8 MG tablet Take 1 tablet by mouth Every 8 (Eight) Hours As Needed for Nausea or Vomiting. 30 tablet 2   • Palbociclib (IBRANCE) 100 MG capsule capsule Take 1 capsule by mouth Daily. For 21 days on then 7 days off. 21 capsule 6   • repaglinide (PRANDIN) 2 MG tablet Take 1 tablet by mouth 3 (Three) Times a Day Before Meals. 270 tablet 1   • vitamin D (ERGOCALCIFEROL) 38495 units capsule capsule 50,000 Units Every 7 (Seven) Days.     • [DISCONTINUED] glucose blood test strip 1 each by Other route 3 (Three) Times a Day. Use as instructed 100 each 5     No current facility-administered medications on file prior to visit.        ALLERGIES:   No Known Allergies    Social History     Socioeconomic  History   • Marital status:      Spouse name: Not on file   • Number of children: Not on file   • Years of education: Not on file   • Highest education level: Not on file   Tobacco Use   • Smoking status: Never Smoker   • Smokeless tobacco: Never Used   Substance and Sexual Activity   • Alcohol use: No   • Drug use: No   • Sexual activity: Yes     Partners: Male     Birth control/protection: Post-menopausal     Family History   Problem Relation Age of Onset   • Coronary artery disease Mother         Mother  from MI at 72   • Diabetes Mother    • Breast cancer Mother    • Hyperlipidemia Mother    • Arthritis Mother    • Cancer Mother    • Aortic aneurysm Father         Father with ruptured thoracic aortic aneurysm   • Coronary artery disease Father         Father  from MI at 74   • Heart disease Father    • Hyperlipidemia Father    • Arthritis Father    • Coronary artery disease Maternal Grandmother         MGM deceeased from MI at age 76   • Malig Hyperthermia Neg Hx               Review of Systems   Constitutional: Positive for fatigue. Negative for activity change, appetite change, fever and unexpected weight change.   HENT: Negative for congestion, mouth sores, nosebleeds, sore throat and voice change.    Respiratory: Negative for cough, shortness of breath and wheezing.    Cardiovascular: Negative for chest pain, palpitations and leg swelling.   Gastrointestinal: Positive for nausea. Negative for abdominal distention, abdominal pain, blood in stool, constipation, diarrhea and vomiting.   Endocrine: Negative for cold intolerance and heat intolerance.   Genitourinary: Negative for difficulty urinating, dysuria, frequency and hematuria.   Musculoskeletal: Negative for arthralgias, back pain, joint swelling and myalgias.   Skin: Negative for rash.   Neurological: Positive for numbness. Negative for dizziness, syncope, weakness, light-headedness and headaches.   Hematological: Negative for  adenopathy. Does not bruise/bleed easily.   Psychiatric/Behavioral: Negative for confusion and sleep disturbance. The patient is not nervous/anxious.          Objective      Vitals:    09/24/19 1314   BP: 128/76   Pulse: 82   Resp: 16   Temp: 98.5 °F (36.9 °C)   SpO2: 92%      Current Status 9/24/2019   ECOG score 1   Pain 0/10    Physical Exam   Constitutional: She is oriented to person, place, and time. She appears well-developed and well-nourished.   HENT:   Mouth/Throat: Oropharynx is clear and moist.   Eyes: Conjunctivae are normal.   Neck: No thyromegaly present.   Cardiovascular: Normal rate and regular rhythm. Exam reveals no gallop and no friction rub.   Murmur heard.  Pulmonary/Chest: Breath sounds normal. No respiratory distress.   Abdominal: Soft. Bowel sounds are normal. She exhibits no distension. There is no tenderness.   Musculoskeletal: She exhibits edema.   1+ chronic bilateral lower extremity edema with stasis changes noted   Lymphadenopathy:        Head (right side): No submandibular adenopathy present.     She has no cervical adenopathy.     She has no axillary adenopathy.        Right: No inguinal and no supraclavicular adenopathy present.        Left: No inguinal and no supraclavicular adenopathy present.   Neurological: She is alert and oriented to person, place, and time. She displays normal reflexes. No cranial nerve deficit. She exhibits normal muscle tone.   Skin: Skin is warm and dry. No rash noted.   Psychiatric: She has a normal mood and affect. Her behavior is normal.   Patient examined today, unchanged from above    RECENT LABS:  Hematology WBC   Date Value Ref Range Status   09/24/2019 3.91 3.40 - 10.80 10*3/mm3 Final   03/16/2019 7.6 3.4 - 10.8 x10E3/uL Final     RBC   Date Value Ref Range Status   09/24/2019 3.37 (L) 3.77 - 5.28 10*6/mm3 Final   03/16/2019 3.80 3.77 - 5.28 x10E6/uL Final     Hemoglobin   Date Value Ref Range Status   09/24/2019 9.9 (L) 12.0 - 15.9 g/dL Final      Hematocrit   Date Value Ref Range Status   09/24/2019 32.0 (L) 34.0 - 46.6 % Final     Platelets   Date Value Ref Range Status   09/24/2019 203 140 - 450 10*3/mm3 Final        Lab Results   Component Value Date    GLUCOSE 113 09/24/2019    BUN 29 (H) 09/24/2019    CREATININE 2.02 (C) 09/24/2019    EGFRIFNONA 25 (L) 09/24/2019    EGFRIFAFRI 35 (L) 03/16/2019    BCR 14.4 09/24/2019    K 5.4 (H) 09/24/2019    CO2 28.1 09/24/2019    CALCIUM 9.1 09/24/2019    PROTENTOTREF 7.0 03/16/2019    ALBUMIN 4.00 09/24/2019    LABIL2 1.2 03/16/2019    AST 11 09/24/2019    ALT 7 09/24/2019     CA 15-3 on 9/17/2019 was 26.3    Renal ultrasound 9/20/2019:  IMPRESSION:  There is evidence for moderate bilateral renal cortical  atrophy with sonographic features of medical renal disease. Right renal  cysts are noted.    Reviewed procedure note and pathology results from D&C/hysteroscopy with Dr. Oliveros on 9/19/2019    Assessment/Plan    1. Metastatic lobular breast cancer (ER/IA positive, HER-2/tj negative):  · History of stage IIIC right breast cancer ([id number])  · Patient treated previously in the [facility]  · Diagnosis via excisional biopsy 8/23/2003 with lobular carcinoma, 4.5 cm, positive margins.  ER/IA positive, HER-2/tj negative.  · Staging evaluation in September 2003 with negative bone scan and negative CT scans.  Ejection fraction 65%.  · Received neoadjuvant chemotherapy (? GET regimen) which apparently included Taxotere and possibly epirubicin.  Received 6 cycles.  · 1/22/2004 bilateral mastectomy with right axillary dissection.  Per available records, 10-12 cm residual invasive lobular carcinoma in the breast with 14/16 lymph nodes positive with extranodal extension.  Immediate reconstruction.  · Received adjuvant chemotherapy with carboplatin and navelbine x4 cycles  · Initiated adjuvant tamoxifen 6/22/2004  · Adjuvant radiation therapy initiated but interrupted due to chest wall cellulitis requiring removal of  implants in August 2004  · Implant reconstruction again attempted with MRSA infection, implant removed 12/30/2005 with no further attempts made and reconstruction.  · Completed 5 years tamoxifen in June 2009 and subsequently transitioned to Femara.  Received Femara until June 2014.  · Patient experienced postmenopausal vaginal bleeding in 2013, negative endometrial biopsy and gynecologic evaluation.  · Patient last seen in [facility] 6/18/2014  · CT chest performed to evaluate bicuspid aortic valve and dilated asending aorta 7/12/2019.  CT showed no change in aorta however showed new free intraperitoneal fluid in the upper abdomen with mesenteric edema, concerning for carcinomatosis.  · CT abdomen and pelvis (without IV contrast) on 7/16/2019 with mesenteric thickening, small volume of complex fluid in the mesentery which was suspicious and possibly representative of carcinomatosis, small amount of perihepatic fluid, small bowel loops tethered to omentum without obstruction, omental thickening, shotty retroperitoneal and pelvic lymph nodes (non-pathologically enlarged).  · PET scan 7/26/2019 with hypermetabolic omental activity, hypermetabolic small retroperitoneal lymph nodes, hypermetabolic activity throughout uterus with increase in size.  · CT-guided omental biopsy 7/30/2019 with metastatic lobular carcinoma of breast primary, ER positive (greater than 95%), TN positive (90%), HER-2/tj negative (1+ IHC)  · Baseline CA 15-3 on 7/22/19 was 32.6  · Initiation of Aromasin 25 mg daily 8/12/2019.  Initiated Ibrance at 100 mg 21/28 days on 8/26/2019.  · Improvement in CA 15-3 on 9/17/19-26.3  · Patient returns today in follow-up having initiated cycle 2 Ibrance on 9/23/2019.  She is tolerating this reasonably well with mild nausea.  Her counts remained acceptable throughout cycle 1.  We will continue on same dose with cycle 2.  We will monitor her counts in 2 weeks and again in 3 weeks with RN review.  In 3 weeks  she will undergo repeat CT scans and I will see her in 4 weeks for follow-up when she is due to begin cycle 3.  2. Normocytic anemia in the setting of CKD3:  · The patient appears to have a chronic anemia with hemoglobin in the 10-11 range with normal MCV.  · Patient has underlying CKD3 with baseline creatinine recently in the 1.5-1.8 range.  · Anemia evaluation 7/22/2019 with iron 30, ferritin 85.2, iron saturation 10%, TIBC 311, B12 370, erythropoietin level 20.4  · Anemia felt in large part to be related to CKD3 as well as to underlying malignancy  · Evidence of folate deficiency 8/12/2019 with level 3.84, initiated folic acid 1 mg daily  · If hemoglobin consistently below 10 consider use of Procrit.   · Hemoglobin today slightly improved at 9.9  3. CKD3 with RYAN:  · Baseline creatinine recently in 1.6-2.0 range  · On 9/10/2019, RYAN/CKD3 with creatinine up to 2.44, BUN of 40.  Received 1 L normal saline.  Patient with increase in oral fluid intake.  · Renal ultrasound 9/20/2019 with no evidence of obstructive uropathy.  · Today, patient returns with creatinine that has returned to her prior baseline 2.02, BUN 29.  She was encouraged to continue increased oral fluid intake at home.  Recheck in 3 weeks and again in 4 weeks.  4. CHF with mild to moderate aortic stenosis:  · Recent cardiology evaluation with echocardiogram 7/12/2019 showing ejection fraction 48% with moderate dilation of the LV cavity, global hypokinesis, grade 2 diastolic dysfunction, mild to moderate aortic stenosis, trivial pericardial effusion.  · CT chest 7/12/2019 with no change in the aorta compared to prior study 12/13/2017.  5. Left upper and bilateral lower extremity peripheral neuropathy:  · Patient reports that left upper extremity neuropathy was not present from previous chemotherapy but occurred after hospitalization that required intubation and critical care stay.  Apparently felt to represent critical care neuropathy.  Improved over  time.  · Bilateral lower extremity neuropathy felt to be related to diabetes  · May have future implications regarding treatment for breast cancer.  6. Uterine/endometrial activity on PET scan:  · Patient experienced postmenopausal vaginal bleeding in 2013, negative endometrial biopsy and gynecologic evaluation.  · With findings on PET scan with enlarging uterus and some hypermetabolic activity, referred back to Dr. Oliveros and gynecology.    · 9/19/2019 D&C with hysteroscopy by Dr. Oliveros, no significant intraoperative findings, pathologic results with benign findings, no evidence of malignancy.  7. Nausea:  · Mild, has not required Zofran.  No emesis.  8. Myelosuppression secondary to Ibrance:  · With cycle 1, jessica WBC 2.49 with ANC 1.25 and platelet count 140,000.  · Currently, counts acceptable to continue on with cycle 2  · Continue same dose with cycle 2, recheck counts in 2 and 3 weeks     Plan:  1. Continue Aromasin 25 mg daily.    2. Continue Ibrance 100 mg daily for 21/28 days (initiated cycle 2 on 9/23/2019).  3. Continue oral folic acid 1 mg daily  4. Continue with increased oral fluid intake  5. In 2 weeks CBC with RN review  6. In 3 weeks CBC, CMP, CA 15-3 and RN review  7. In 3 weeks CT chest abdomen pelvis (without IV contrast)  8. In 4 weeks MD visit with CBC, CMP and pending scan results continue Aromasin/Ibrance with cycle 3.    Patient continues on high risk medication requiring intensive monitoring.

## 2019-09-25 NOTE — TELEPHONE ENCOUNTER
Called pt and instructed her to adjust diet to avoid potassium rich foods due to elevated potassium. Pt v/u.     ----- Message from Leodan Irvin Jr., MD sent at 9/25/2019  4:40 PM EDT -----  Please notify patient of elevated potassium from yesterday and need to make dietary adjustments to avoid potassium containing foods.  ----- Message -----  From: Lab, Background User  Sent: 9/24/2019   1:01 PM  To: Leodan Irvin Jr., MD

## 2019-10-08 ENCOUNTER — CLINICAL SUPPORT (OUTPATIENT)
Dept: ONCOLOGY | Facility: HOSPITAL | Age: 59
End: 2019-10-08

## 2019-10-08 ENCOUNTER — LAB (OUTPATIENT)
Dept: LAB | Facility: HOSPITAL | Age: 59
End: 2019-10-08

## 2019-10-08 DIAGNOSIS — C50.919 METASTATIC BREAST CANCER: ICD-10-CM

## 2019-10-08 LAB
BASOPHILS # BLD AUTO: 0.05 10*3/MM3 (ref 0–0.2)
BASOPHILS NFR BLD AUTO: 1.2 % (ref 0–1.5)
DEPRECATED RDW RBC AUTO: 56.2 FL (ref 37–54)
EOSINOPHIL # BLD AUTO: 0.14 10*3/MM3 (ref 0–0.4)
EOSINOPHIL NFR BLD AUTO: 3.4 % (ref 0.3–6.2)
ERYTHROCYTE [DISTWIDTH] IN BLOOD BY AUTOMATED COUNT: 17 % (ref 12.3–15.4)
HCT VFR BLD AUTO: 31.1 % (ref 34–46.6)
HGB BLD-MCNC: 10 G/DL (ref 12–15.9)
IMM GRANULOCYTES # BLD AUTO: 0.03 10*3/MM3 (ref 0–0.05)
IMM GRANULOCYTES NFR BLD AUTO: 0.7 % (ref 0–0.5)
LYMPHOCYTES # BLD AUTO: 1.2 10*3/MM3 (ref 0.7–3.1)
LYMPHOCYTES NFR BLD AUTO: 29.5 % (ref 19.6–45.3)
MCH RBC QN AUTO: 29.9 PG (ref 26.6–33)
MCHC RBC AUTO-ENTMCNC: 32.2 G/DL (ref 31.5–35.7)
MCV RBC AUTO: 92.8 FL (ref 79–97)
MONOCYTES # BLD AUTO: 0.3 10*3/MM3 (ref 0.1–0.9)
MONOCYTES NFR BLD AUTO: 7.4 % (ref 5–12)
NEUTROPHILS # BLD AUTO: 2.35 10*3/MM3 (ref 1.7–7)
NEUTROPHILS NFR BLD AUTO: 57.8 % (ref 42.7–76)
NRBC BLD AUTO-RTO: 0 /100 WBC (ref 0–0.2)
PLATELET # BLD AUTO: 271 10*3/MM3 (ref 140–450)
PMV BLD AUTO: 9 FL (ref 6–12)
RBC # BLD AUTO: 3.35 10*6/MM3 (ref 3.77–5.28)
WBC NRBC COR # BLD: 4.07 10*3/MM3 (ref 3.4–10.8)

## 2019-10-08 PROCEDURE — 36415 COLL VENOUS BLD VENIPUNCTURE: CPT

## 2019-10-08 PROCEDURE — 85025 COMPLETE CBC W/AUTO DIFF WBC: CPT

## 2019-10-15 ENCOUNTER — INFUSION (OUTPATIENT)
Dept: ONCOLOGY | Facility: HOSPITAL | Age: 59
End: 2019-10-15

## 2019-10-15 ENCOUNTER — LAB (OUTPATIENT)
Dept: LAB | Facility: HOSPITAL | Age: 59
End: 2019-10-15

## 2019-10-15 VITALS
TEMPERATURE: 98.7 F | DIASTOLIC BLOOD PRESSURE: 80 MMHG | BODY MASS INDEX: 56.88 KG/M2 | SYSTOLIC BLOOD PRESSURE: 180 MMHG | HEART RATE: 74 BPM | WEIGHT: 293 LBS | OXYGEN SATURATION: 96 %

## 2019-10-15 DIAGNOSIS — C50.919 METASTATIC BREAST CANCER: ICD-10-CM

## 2019-10-15 DIAGNOSIS — R79.89 ELEVATED SERUM CREATININE: ICD-10-CM

## 2019-10-15 DIAGNOSIS — C50.919 METASTATIC BREAST CANCER: Primary | ICD-10-CM

## 2019-10-15 LAB
ALBUMIN SERPL-MCNC: 3.7 G/DL (ref 3.5–5.2)
ALBUMIN/GLOB SERPL: 0.9 G/DL (ref 1.1–2.4)
ALP SERPL-CCNC: 73 U/L (ref 38–116)
ALT SERPL W P-5'-P-CCNC: 9 U/L (ref 0–33)
ANION GAP SERPL CALCULATED.3IONS-SCNC: 13.1 MMOL/L (ref 5–15)
AST SERPL-CCNC: 13 U/L (ref 0–32)
BASOPHILS # BLD AUTO: 0.03 10*3/MM3 (ref 0–0.2)
BASOPHILS NFR BLD AUTO: 1.3 % (ref 0–1.5)
BILIRUB SERPL-MCNC: 0.4 MG/DL (ref 0.2–1.2)
BUN BLD-MCNC: 43 MG/DL (ref 6–20)
BUN/CREAT SERPL: 15.4 (ref 7.3–30)
CALCIUM SPEC-SCNC: 8.8 MG/DL (ref 8.5–10.2)
CANCER AG15-3 SERPL-ACNC: 25.9 U/ML
CHLORIDE SERPL-SCNC: 106 MMOL/L (ref 98–107)
CO2 SERPL-SCNC: 24.9 MMOL/L (ref 22–29)
CREAT BLD-MCNC: 2.79 MG/DL (ref 0.6–1.1)
DEPRECATED RDW RBC AUTO: 61.1 FL (ref 37–54)
EOSINOPHIL # BLD AUTO: 0.1 10*3/MM3 (ref 0–0.4)
EOSINOPHIL NFR BLD AUTO: 4.3 % (ref 0.3–6.2)
ERYTHROCYTE [DISTWIDTH] IN BLOOD BY AUTOMATED COUNT: 17.5 % (ref 12.3–15.4)
GFR SERPL CREATININE-BSD FRML MDRD: 17 ML/MIN/1.73
GLOBULIN UR ELPH-MCNC: 4 GM/DL (ref 1.8–3.5)
GLUCOSE BLD-MCNC: 184 MG/DL (ref 74–124)
HCT VFR BLD AUTO: 29.3 % (ref 34–46.6)
HGB BLD-MCNC: 9.4 G/DL (ref 12–15.9)
IMM GRANULOCYTES # BLD AUTO: 0.01 10*3/MM3 (ref 0–0.05)
IMM GRANULOCYTES NFR BLD AUTO: 0.4 % (ref 0–0.5)
LYMPHOCYTES # BLD AUTO: 0.95 10*3/MM3 (ref 0.7–3.1)
LYMPHOCYTES NFR BLD AUTO: 40.8 % (ref 19.6–45.3)
MCH RBC QN AUTO: 31 PG (ref 26.6–33)
MCHC RBC AUTO-ENTMCNC: 32.1 G/DL (ref 31.5–35.7)
MCV RBC AUTO: 96.7 FL (ref 79–97)
MONOCYTES # BLD AUTO: 0.18 10*3/MM3 (ref 0.1–0.9)
MONOCYTES NFR BLD AUTO: 7.7 % (ref 5–12)
NEUTROPHILS # BLD AUTO: 1.06 10*3/MM3 (ref 1.7–7)
NEUTROPHILS NFR BLD AUTO: 45.5 % (ref 42.7–76)
NRBC BLD AUTO-RTO: 0 /100 WBC (ref 0–0.2)
PLATELET # BLD AUTO: 170 10*3/MM3 (ref 140–450)
PMV BLD AUTO: 9 FL (ref 6–12)
POTASSIUM BLD-SCNC: 5.2 MMOL/L (ref 3.5–4.7)
PROT SERPL-MCNC: 7.7 G/DL (ref 6.3–8)
RBC # BLD AUTO: 3.03 10*6/MM3 (ref 3.77–5.28)
SODIUM BLD-SCNC: 144 MMOL/L (ref 134–145)
WBC NRBC COR # BLD: 2.33 10*3/MM3 (ref 3.4–10.8)

## 2019-10-15 PROCEDURE — 86300 IMMUNOASSAY TUMOR CA 15-3: CPT | Performed by: INTERNAL MEDICINE

## 2019-10-15 PROCEDURE — 80053 COMPREHEN METABOLIC PANEL: CPT

## 2019-10-15 PROCEDURE — 96360 HYDRATION IV INFUSION INIT: CPT | Performed by: INTERNAL MEDICINE

## 2019-10-15 PROCEDURE — 36415 COLL VENOUS BLD VENIPUNCTURE: CPT

## 2019-10-15 PROCEDURE — 85025 COMPLETE CBC W/AUTO DIFF WBC: CPT

## 2019-10-15 RX ADMIN — SODIUM CHLORIDE 1000 ML: 900 INJECTION, SOLUTION INTRAVENOUS at 08:46

## 2019-10-15 NOTE — PROGRESS NOTES
CBC and CMP reviewed with Dr Irvin. Creat 2.79. To get 1 liter of NS today and return on Friday for BMP and RN review. Patient encouraged to increase oral fluid intake. Verbalized understanding.

## 2019-10-16 ENCOUNTER — HOSPITAL ENCOUNTER (OUTPATIENT)
Dept: PET IMAGING | Facility: HOSPITAL | Age: 59
Discharge: HOME | End: 2019-10-16
Admitting: INTERNAL MEDICINE

## 2019-10-16 DIAGNOSIS — C50.919 METASTATIC BREAST CANCER: ICD-10-CM

## 2019-10-16 PROCEDURE — 74176 CT ABD & PELVIS W/O CONTRAST: CPT

## 2019-10-16 PROCEDURE — 71250 CT THORAX DX C-: CPT

## 2019-10-16 PROCEDURE — 0 DIATRIZOATE MEGLUMINE & SODIUM PER 1 ML: Performed by: INTERNAL MEDICINE

## 2019-10-16 RX ADMIN — DIATRIZOATE MEGLUMINE AND DIATRIZOATE SODIUM 30 ML: 660; 100 LIQUID ORAL; RECTAL at 09:46

## 2019-10-18 ENCOUNTER — LAB (OUTPATIENT)
Dept: LAB | Facility: HOSPITAL | Age: 59
End: 2019-10-18

## 2019-10-18 ENCOUNTER — CLINICAL SUPPORT (OUTPATIENT)
Dept: ONCOLOGY | Facility: HOSPITAL | Age: 59
End: 2019-10-18

## 2019-10-18 VITALS
OXYGEN SATURATION: 96 % | TEMPERATURE: 98.7 F | DIASTOLIC BLOOD PRESSURE: 74 MMHG | SYSTOLIC BLOOD PRESSURE: 121 MMHG | HEART RATE: 66 BPM

## 2019-10-18 DIAGNOSIS — C50.919 METASTATIC BREAST CANCER: ICD-10-CM

## 2019-10-18 LAB
ANION GAP SERPL CALCULATED.3IONS-SCNC: 11.3 MMOL/L (ref 5–15)
BUN BLD-MCNC: 31 MG/DL (ref 6–20)
BUN/CREAT SERPL: 15.5 (ref 7.3–30)
CALCIUM SPEC-SCNC: 9.1 MG/DL (ref 8.5–10.2)
CHLORIDE SERPL-SCNC: 104 MMOL/L (ref 98–107)
CO2 SERPL-SCNC: 26.7 MMOL/L (ref 22–29)
CREAT BLD-MCNC: 2 MG/DL (ref 0.6–1.1)
GFR SERPL CREATININE-BSD FRML MDRD: 25 ML/MIN/1.73
GLUCOSE BLD-MCNC: 190 MG/DL (ref 74–124)
POTASSIUM BLD-SCNC: 5.8 MMOL/L (ref 3.5–4.7)
SODIUM BLD-SCNC: 142 MMOL/L (ref 134–145)

## 2019-10-18 PROCEDURE — 80048 BASIC METABOLIC PNL TOTAL CA: CPT

## 2019-10-18 RX ORDER — SODIUM POLYSTYRENE SULFONATE 15 G/60ML
25 SUSPENSION ORAL; RECTAL ONCE
Status: COMPLETED | OUTPATIENT
Start: 2019-10-18 | End: 2019-10-18

## 2019-10-18 RX ADMIN — SODIUM POLYSTYRENE SULFONATE 25 G: 15 SUSPENSION ORAL; RECTAL at 11:49

## 2019-10-18 NOTE — PROGRESS NOTES
Pt is here for lab with RN review.  CBC and BMP reviewed with Dr. Irvin. Per Dr. Irvin pt to to take 25 g  Kayexalate . This was given in the office. Educational sheet given to pt on Kayexalate. Pt will call with any questions or concerns,   Copy of labs and AVS. VSS.  given to pt and f/u appt reviewed. Pt is instructed to call the office with any concerns or new symptoms prior to next visit. Pt vu.   Lab Results   Component Value Date    GLUCOSE 190 (H) 10/18/2019    CALCIUM 9.1 10/18/2019     10/18/2019    K 5.8 (C) 10/18/2019    CO2 26.7 10/18/2019     10/18/2019    BUN 31 (H) 10/18/2019    CREATININE 2.00 (C) 10/18/2019    EGFRIFAFRI 35 (L) 03/16/2019    EGFRIFNONA 25 (L) 10/18/2019    BCR 15.5 10/18/2019    ANIONGAP 11.3 10/18/2019

## 2019-10-23 ENCOUNTER — LAB (OUTPATIENT)
Dept: LAB | Facility: HOSPITAL | Age: 59
End: 2019-10-23

## 2019-10-23 ENCOUNTER — OFFICE VISIT (OUTPATIENT)
Dept: ONCOLOGY | Facility: CLINIC | Age: 59
End: 2019-10-23

## 2019-10-23 VITALS
HEIGHT: 68 IN | SYSTOLIC BLOOD PRESSURE: 128 MMHG | BODY MASS INDEX: 44.41 KG/M2 | TEMPERATURE: 98.2 F | DIASTOLIC BLOOD PRESSURE: 52 MMHG | WEIGHT: 293 LBS | OXYGEN SATURATION: 94 % | RESPIRATION RATE: 16 BRPM | HEART RATE: 87 BPM

## 2019-10-23 DIAGNOSIS — C50.919 METASTATIC BREAST CANCER: Primary | ICD-10-CM

## 2019-10-23 DIAGNOSIS — C50.919 METASTATIC BREAST CANCER: ICD-10-CM

## 2019-10-23 LAB
ALBUMIN SERPL-MCNC: 3.9 G/DL (ref 3.5–5.2)
ALBUMIN/GLOB SERPL: 1 G/DL (ref 1.1–2.4)
ALP SERPL-CCNC: 75 U/L (ref 38–116)
ALT SERPL W P-5'-P-CCNC: 10 U/L (ref 0–33)
ANION GAP SERPL CALCULATED.3IONS-SCNC: 11.9 MMOL/L (ref 5–15)
AST SERPL-CCNC: 11 U/L (ref 0–32)
BASOPHILS # BLD AUTO: 0.07 10*3/MM3 (ref 0–0.2)
BASOPHILS NFR BLD AUTO: 1.6 % (ref 0–1.5)
BILIRUB SERPL-MCNC: 0.5 MG/DL (ref 0.2–1.2)
BUN BLD-MCNC: 31 MG/DL (ref 6–20)
BUN/CREAT SERPL: 14.1 (ref 7.3–30)
CALCIUM SPEC-SCNC: 9.1 MG/DL (ref 8.5–10.2)
CHLORIDE SERPL-SCNC: 104 MMOL/L (ref 98–107)
CO2 SERPL-SCNC: 26.1 MMOL/L (ref 22–29)
CREAT BLD-MCNC: 2.2 MG/DL (ref 0.6–1.1)
DEPRECATED RDW RBC AUTO: 61 FL (ref 37–54)
EOSINOPHIL # BLD AUTO: 0.14 10*3/MM3 (ref 0–0.4)
EOSINOPHIL NFR BLD AUTO: 3.2 % (ref 0.3–6.2)
ERYTHROCYTE [DISTWIDTH] IN BLOOD BY AUTOMATED COUNT: 17.3 % (ref 12.3–15.4)
GFR SERPL CREATININE-BSD FRML MDRD: 23 ML/MIN/1.73
GLOBULIN UR ELPH-MCNC: 3.8 GM/DL (ref 1.8–3.5)
GLUCOSE BLD-MCNC: 188 MG/DL (ref 74–124)
HCT VFR BLD AUTO: 31.3 % (ref 34–46.6)
HGB BLD-MCNC: 10 G/DL (ref 12–15.9)
IMM GRANULOCYTES # BLD AUTO: 0.04 10*3/MM3 (ref 0–0.05)
IMM GRANULOCYTES NFR BLD AUTO: 0.9 % (ref 0–0.5)
LYMPHOCYTES # BLD AUTO: 1.4 10*3/MM3 (ref 0.7–3.1)
LYMPHOCYTES NFR BLD AUTO: 32.3 % (ref 19.6–45.3)
MCH RBC QN AUTO: 30.9 PG (ref 26.6–33)
MCHC RBC AUTO-ENTMCNC: 31.9 G/DL (ref 31.5–35.7)
MCV RBC AUTO: 96.6 FL (ref 79–97)
MONOCYTES # BLD AUTO: 0.57 10*3/MM3 (ref 0.1–0.9)
MONOCYTES NFR BLD AUTO: 13.1 % (ref 5–12)
NEUTROPHILS # BLD AUTO: 2.12 10*3/MM3 (ref 1.7–7)
NEUTROPHILS NFR BLD AUTO: 48.9 % (ref 42.7–76)
NRBC BLD AUTO-RTO: 0 /100 WBC (ref 0–0.2)
PLATELET # BLD AUTO: 230 10*3/MM3 (ref 140–450)
PMV BLD AUTO: 8.7 FL (ref 6–12)
POTASSIUM BLD-SCNC: 5.1 MMOL/L (ref 3.5–4.7)
PROT SERPL-MCNC: 7.7 G/DL (ref 6.3–8)
RBC # BLD AUTO: 3.24 10*6/MM3 (ref 3.77–5.28)
SODIUM BLD-SCNC: 142 MMOL/L (ref 134–145)
WBC NRBC COR # BLD: 4.34 10*3/MM3 (ref 3.4–10.8)

## 2019-10-23 PROCEDURE — 99215 OFFICE O/P EST HI 40 MIN: CPT | Performed by: INTERNAL MEDICINE

## 2019-10-23 PROCEDURE — G0463 HOSPITAL OUTPT CLINIC VISIT: HCPCS | Performed by: INTERNAL MEDICINE

## 2019-10-23 PROCEDURE — 36415 COLL VENOUS BLD VENIPUNCTURE: CPT

## 2019-10-23 PROCEDURE — 80053 COMPREHEN METABOLIC PANEL: CPT

## 2019-10-23 PROCEDURE — 85025 COMPLETE CBC W/AUTO DIFF WBC: CPT

## 2019-10-23 NOTE — PROGRESS NOTES
Subjective .     REASONS FOR FOLLOWUP:    1. Metastatic lobular breast cancer (ER/KS positive, HER-2/tj negative):  · History of stage IIIC right breast cancer ([id number])  · Patient treated previously in the [facility] system  · Diagnosis via excisional biopsy 8/23/2003 with lobular carcinoma, 4.5 cm, positive margins.  ER/KS positive, HER-2/tj negative.  · Staging evaluation in September 2003 with negative bone scan and negative CT scans.  Ejection fraction 65%.  · Received neoadjuvant chemotherapy (? GET regimen) which apparently included Taxotere and possibly epirubicin.  Received 6 cycles.  · 1/22/2004 bilateral mastectomy with right axillary dissection.  Per available records, 10-12 cm residual invasive lobular carcinoma in the breast with 14/16 lymph nodes positive with extranodal extension.  Immediate reconstruction.  · Received adjuvant chemotherapy with carboplatin and navelbine x4 cycles  · Initiated adjuvant tamoxifen 6/22/2004  · Adjuvant radiation therapy initiated but interrupted due to chest wall cellulitis requiring removal of implants in August 2004  · Implant reconstruction again attempted with MRSA infection, implant removed 12/30/2005 with no further attempts made and reconstruction.  · Completed 5 years tamoxifen in June 2009 and subsequently transitioned to Femara.  Received Femara until June 2014.  · Patient experienced postmenopausal vaginal bleeding in 2013, negative endometrial biopsy and gynecologic evaluation.  · Patient last seen in [facility] 6/18/2014  · CT chest performed to evaluate bicuspid aortic valve and dilated asending aorta 7/12/2019.  CT showed no change in aorta however showed new free intraperitoneal fluid in the upper abdomen with mesenteric edema, concerning for carcinomatosis.  · CT abdomen and pelvis (without IV contrast) on 7/16/2019 with mesenteric thickening, small volume of complex fluid in the mesentery which was suspicious and possibly representative of  carcinomatosis, small amount of perihepatic fluid, small bowel loops tethered to omentum without obstruction, omental thickening, shotty retroperitoneal and pelvic lymph nodes (non-pathologically enlarged).  · PET scan 7/26/2019 with hypermetabolic omental activity, hypermetabolic small retroperitoneal lymph nodes, hypermetabolic activity throughout uterus with increase in size.  · CT-guided omental biopsy 7/30/2019 with metastatic lobular carcinoma of breast primary, ER positive (greater than 95%), AL positive (90%), HER-2/tj negative (1+ IHC)  · Baseline CA 15-3 on 7/22/19 was 32.6  · Initiation of Aromasin 25 mg daily 8/12/2019.  Initiated Ibrance on 8/26/2019 at 100 mg 21/28 days on 8/26/2019.    · Response on CT scans 10/16/2019 following 2 cycles of Ibrance/Aromasin.  2. Anemia:  · Normocytic anemia, hemoglobin in 10-11 range  · Anemia evaluation 7/22/2019 with iron 30, ferritin 85.2, iron saturation 10%, TIBC 311, B12 370, erythropoietin level 20.4  · Anemia felt in large part to be related to CKD3 as well as to underlying malignancy  · Evidence of folate deficiency 8/12/2019 with level 3.84, initiated folic acid 1 mg daily  · If hemoglobin consistently below 10 consider use of Procrit      HISTORY OF PRESENT ILLNESS:  The patient is a 59 y.o. year old female who is here for follow-up with the above-mentioned history.    History of Present Illness   patient returns today in follow-up continuing on Aromasin 25 mg daily and Ibrance 100 mg daily for 21/28 days.  She initiated cycle 3 Ibrance on 10/21/2019.  She is here today with repeat scans to review after 2 cycles of therapy to assess response.  The patient did have some difficulty tolerating treatment.  Last weekend she developed fatigue and some nausea with little p.o. intake at the end of her 3 weeks of Ibrance.  She did not experience any vomiting.  Symptoms did resolve early the next week however her labs on 10/15/2019 again showed evidence of RYAN/CKD  with creatinine up to 2.79, BUN 43.  We did administer IV fluid support and on 10/18/2019, BUN was down to 31 with creatinine 2.0 and she was back to normal oral intake.  She has experienced no other issues.  She has otherwise been attempting to increase her oral fluid intake.  She denies any significant abdominal pain or distention.  Bowel function is normal.    Past Medical History:   Diagnosis Date   • Anemia    • Anxiety and depression    • Bicuspid aortic valve    • Breast cancer (CMS/HCC)     RIGHT, HAD NEELA. MASTECTOMY, CHEMO AND RADIATION   • CKD (chronic kidney disease)    • Diabetes mellitus (CMS/HCC)    • Frequent UTI    • Heart murmur    • History of kidney stones    • History of MRSA infection     POST BREAST SURGERY-TREATED [facility]   • History of sepsis     KIDNEY STONE   • Hyperlipidemia    • Hypertension    • Hyperthyroidism    • Hypothyroidism    • Lymphedema of leg    • Metastasis from breast cancer (CMS/HCC)     STOMACH-OMENTUM   • Neuromuscular disorder (CMS/HCC)    • Obesity    • ANDREW on CPAP    • Osteoarthrosis    • Radiculopathy    • Thickened endometrium    • Tremor      Past Surgical History:   Procedure Laterality Date   • BREAST RECONSTRUCTION      WITH IMPLANTS, AND REVISION, NOW REMOVED   • BREAST SURGERY     •  SECTION  1987   •  SECTION  1987   • D&C HYSTEROSCOPY N/A 2017    Procedure: DILATATION AND CURETTAGE, HYSTEROSCOPY ;  Surgeon: Cherie Oliveros MD;  Location: [facility] OR [facility] [place];  Service:    • D&C HYSTEROSCOPY N/A 2019    Procedure: DILATATION AND CURETTAGE HYSTEROSCOPY/POLYPECTOMY;  Surgeon: Cherie Oliveros MD;  Location: [facility] OR [facility] [place];  Service: Gynecology   • LYMPH NODE BIOPSY     • MASTECTOMY Bilateral 2003   • MASTECTOMY Bilateral          ONCOLOGIC HISTORY:  (History from previous dates can be found in the separate document.)    Current Outpatient Medications on File Prior to Visit   Medication Sig Dispense  Refill   • amLODIPine (NORVASC) 10 MG tablet Take 10 mg by mouth Every Morning.     • aspirin 81 MG EC tablet Take 81 mg by mouth Daily. HELD FOR SURGERY     • atorvastatin (LIPITOR) 40 MG tablet Take 1 tablet by mouth Daily. 90 tablet 1   • carvedilol (COREG) 3.125 MG tablet Take 3.125 mg by mouth 2 (Two) Times a Day With Meals.     • Dulaglutide 1.5 MG/0.5ML solution pen-injector Inject 1.5 mg under the skin into the appropriate area as directed 1 (One) Time Per Week. MONDAYS 12 pen 1   • DULoxetine (CYMBALTA) 30 MG capsule Take 3 capsules by mouth Daily. 270 capsule 1   • exemestane (AROMASIN) 25 MG chemo tablet Take 1 tablet by mouth Daily. 30 tablet 4   • folic acid (FOLVITE) 1 MG tablet Take 1 tablet by mouth Daily. 30 tablet 3   • gabapentin (NEURONTIN) 300 MG capsule Take 1 capsule by mouth 4 (Four) Times a Day. 360 capsule 1   • LEVEMIR FLEXTOUCH 100 UNIT/ML injection Inject 50 Units under the skin into the appropriate area as directed 2 (Two) Times a Day. 3 pen 5   • levothyroxine (SYNTHROID, LEVOTHROID) 50 MCG tablet Take 1 tablet by mouth Daily. 90 tablet 1   • ondansetron (ZOFRAN) 8 MG tablet Take 1 tablet by mouth Every 8 (Eight) Hours As Needed for Nausea or Vomiting. 30 tablet 2   • Palbociclib (IBRANCE) 100 MG capsule capsule Take 1 capsule by mouth Daily. For 21 days on then 7 days off. 21 capsule 6   • repaglinide (PRANDIN) 2 MG tablet Take 1 tablet by mouth 3 (Three) Times a Day Before Meals. 270 tablet 1   • vitamin D (ERGOCALCIFEROL) 79325 units capsule capsule 50,000 Units Every 7 (Seven) Days.       No current facility-administered medications on file prior to visit.        ALLERGIES:   No Known Allergies    Social History     Socioeconomic History   • Marital status:      Spouse name: Not on file   • Number of children: Not on file   • Years of education: Not on file   • Highest education level: Not on file   Tobacco Use   • Smoking status: Never Smoker   • Smokeless tobacco: Never  Used   Substance and Sexual Activity   • Alcohol use: No   • Drug use: No   • Sexual activity: Yes     Partners: Male     Birth control/protection: Post-menopausal     Family History   Problem Relation Age of Onset   • Coronary artery disease Mother         Mother  from MI at 72   • Diabetes Mother    • Breast cancer Mother    • Hyperlipidemia Mother    • Arthritis Mother    • Cancer Mother    • Aortic aneurysm Father         Father with ruptured thoracic aortic aneurysm   • Coronary artery disease Father         Father  from MI at 74   • Heart disease Father    • Hyperlipidemia Father    • Arthritis Father    • Coronary artery disease Maternal Grandmother         MGM deceeased from MI at age 76   • Malig Hyperthermia Neg Hx               Review of Systems   Constitutional: Positive for fatigue. Negative for activity change, appetite change, fever and unexpected weight change.   HENT: Negative for congestion, mouth sores, nosebleeds, sore throat and voice change.    Respiratory: Negative for cough, shortness of breath and wheezing.    Cardiovascular: Negative for chest pain, palpitations and leg swelling.   Gastrointestinal: Positive for nausea. Negative for abdominal distention, abdominal pain, blood in stool, constipation, diarrhea and vomiting.   Endocrine: Negative for cold intolerance and heat intolerance.   Genitourinary: Negative for difficulty urinating, dysuria, frequency and hematuria.   Musculoskeletal: Negative for arthralgias, back pain, joint swelling and myalgias.   Skin: Negative for rash.   Neurological: Positive for numbness. Negative for dizziness, syncope, weakness, light-headedness and headaches.   Hematological: Negative for adenopathy. Does not bruise/bleed easily.   Psychiatric/Behavioral: Negative for confusion and sleep disturbance. The patient is not nervous/anxious.          Objective      Vitals:    10/23/19 0823   BP: 128/52   Pulse: 87   Resp: 16   Temp: 98.2 °F (36.8  °C)   SpO2: 94%      Current Status 10/23/2019   ECOG score 0   Pain 0/10    Physical Exam   Constitutional: She is oriented to person, place, and time. She appears well-developed and well-nourished.   HENT:   Mouth/Throat: Oropharynx is clear and moist.   Eyes: Conjunctivae are normal.   Neck: No thyromegaly present.   Cardiovascular: Normal rate and regular rhythm. Exam reveals no gallop and no friction rub.   Murmur heard.  Pulmonary/Chest: Breath sounds normal. No respiratory distress.   Abdominal: Soft. Bowel sounds are normal. She exhibits no distension. There is no tenderness.   Musculoskeletal: She exhibits edema.   Trace to 1+ chronic bilateral lower extremity edema with stasis changes noted   Lymphadenopathy:        Head (right side): No submandibular adenopathy present.     She has no cervical adenopathy.     She has no axillary adenopathy.        Right: No inguinal and no supraclavicular adenopathy present.        Left: No inguinal and no supraclavicular adenopathy present.   Neurological: She is alert and oriented to person, place, and time. She displays normal reflexes. No cranial nerve deficit. She exhibits normal muscle tone.   Skin: Skin is warm and dry. No rash noted.   Psychiatric: She has a normal mood and affect. Her behavior is normal.       RECENT LABS:  Hematology WBC   Date Value Ref Range Status   10/23/2019 4.34 3.40 - 10.80 10*3/mm3 Final   03/16/2019 7.6 3.4 - 10.8 x10E3/uL Final     RBC   Date Value Ref Range Status   10/23/2019 3.24 (L) 3.77 - 5.28 10*6/mm3 Final   03/16/2019 3.80 3.77 - 5.28 x10E6/uL Final     Hemoglobin   Date Value Ref Range Status   10/23/2019 10.0 (L) 12.0 - 15.9 g/dL Final     Hematocrit   Date Value Ref Range Status   10/23/2019 31.3 (L) 34.0 - 46.6 % Final     Platelets   Date Value Ref Range Status   10/23/2019 230 140 - 450 10*3/mm3 Final        Lab Results   Component Value Date    GLUCOSE 188 (H) 10/23/2019    BUN 31 (H) 10/23/2019    CREATININE 2.20 (C)  10/23/2019    EGFRIFNONA 23 (L) 10/23/2019    EGFRIFAFRI 35 (L) 03/16/2019    BCR 14.1 10/23/2019    K 5.1 (H) 10/23/2019    CO2 26.1 10/23/2019    CALCIUM 9.1 10/23/2019    PROTENTOTREF 7.0 03/16/2019    ALBUMIN 3.90 10/23/2019    LABIL2 1.2 03/16/2019    AST 11 10/23/2019    ALT 10 10/23/2019     CA 15-3 on 10/15/2019 was 25.9    CT chest abdomen pelvis 10/16/2019: I did personally review CT images.  IMPRESSION:  1. Significant interval improvement with near complete resolution of the  small complex ascites within the abdomen and pelvis and there is  significantly less thickening of the mesentery and omentum. There is no  new metastatic disease.  2. There is no evidence for metastatic disease within the chest.      Assessment/Plan    1. Metastatic lobular breast cancer (ER/RI positive, HER-2/tj negative):  · History of stage IIIC right breast cancer ([id number])  · Patient treated previously in the [facility] system  · Diagnosis via excisional biopsy 8/23/2003 with lobular carcinoma, 4.5 cm, positive margins.  ER/RI positive, HER-2/tj negative.  · Staging evaluation in September 2003 with negative bone scan and negative CT scans.  Ejection fraction 65%.  · Received neoadjuvant chemotherapy (? GET regimen) which apparently included Taxotere and possibly epirubicin.  Received 6 cycles.  · 1/22/2004 bilateral mastectomy with right axillary dissection.  Per available records, 10-12 cm residual invasive lobular carcinoma in the breast with 14/16 lymph nodes positive with extranodal extension.  Immediate reconstruction.  · Received adjuvant chemotherapy with carboplatin and navelbine x4 cycles  · Initiated adjuvant tamoxifen 6/22/2004  · Adjuvant radiation therapy initiated but interrupted due to chest wall cellulitis requiring removal of implants in August 2004  · Implant reconstruction again attempted with MRSA infection, implant removed 12/30/2005 with no further attempts made and reconstruction.  · Completed 5 years  tamoxifen in June 2009 and subsequently transitioned to Femara.  Received Femara until June 2014.  · Patient experienced postmenopausal vaginal bleeding in 2013, negative endometrial biopsy and gynecologic evaluation.  · Patient last seen in [facility] 6/18/2014  · CT chest performed to evaluate bicuspid aortic valve and dilated asending aorta 7/12/2019.  CT showed no change in aorta however showed new free intraperitoneal fluid in the upper abdomen with mesenteric edema, concerning for carcinomatosis.  · CT abdomen and pelvis (without IV contrast) on 7/16/2019 with mesenteric thickening, small volume of complex fluid in the mesentery which was suspicious and possibly representative of carcinomatosis, small amount of perihepatic fluid, small bowel loops tethered to omentum without obstruction, omental thickening, shotty retroperitoneal and pelvic lymph nodes (non-pathologically enlarged).  · PET scan 7/26/2019 with hypermetabolic omental activity, hypermetabolic small retroperitoneal lymph nodes, hypermetabolic activity throughout uterus with increase in size.  · CT-guided omental biopsy 7/30/2019 with metastatic lobular carcinoma of breast primary, ER positive (greater than 95%), TX positive (90%), HER-2/tj negative (1+ IHC)  · Baseline CA 15-3 on 7/22/19 was 32.6  · Initiation of Aromasin 25 mg daily 8/12/2019.  Initiated Ibrance at 100 mg 21/28 days on 8/26/2019.  · Improvement in CA 15-3 on 9/17/19-26.3  · The patient returns today in follow-up with scans to review after 2 cycles of Aromasin and Ibrance.  Her CA 15-3 continues to decline, most recently checked on 10/15/2019 at 25.9.  CT scan chest abdomen pelvis 10/16/2019 showed improvement in the mesenteric and omental thickening and near resolution of complex ascites.  There is no other evidence of disease on the current scan.  The patient is having some recurrent issue with intermittent diminished fluid intake on treatment resulting in RYAN/CKD.  She does  respond to IV fluid administration when this occurs.  She last received fluids last week and her creatinine returned to baseline at 2.0 on 10/18/2019 having increased to 2.79 on 10/15/2019.  Today, creatinine is 2.0 with BUN of 31.  We will attempt to have her come in more frequently and to in 3 weeks for possible IV fluid support during this third cycle.  She did initiate the cycle of Ibrance on 10/21/2019.  If we are unable to avoid episodes of dehydration we may consider altering her Ibrance dosing to 100 mg daily 7 days on followed by 7 days off.  For now we will try to adhere to the current schedule of 100 mg for 21/28 days.  We will plan repeat scans at the end of cycle 4.  We will recheck CA 15-3 when we repeat scans.  2. Normocytic anemia in the setting of CKD3:  · The patient appears to have a chronic anemia with hemoglobin in the 10-11 range with normal MCV.  · Patient has underlying CKD3 with baseline creatinine recently in the 1.5-1.8 range.  · Anemia evaluation 7/22/2019 with iron 30, ferritin 85.2, iron saturation 10%, TIBC 311, B12 370, erythropoietin level 20.4  · Anemia felt in large part to be related to CKD3 as well as to underlying malignancy  · Evidence of folate deficiency 8/12/2019 with level 3.84, initiated folic acid 1 mg daily  · If hemoglobin consistently below 10 consider use of Procrit.   · Hemoglobin today stable at 10.0  3. CKD3 with RYAN:  · Baseline creatinine recently in 1.6-2.0 range  · On 9/10/2019, RYAN/CKD3 with creatinine up to 2.44, BUN of 40.  Received 1 L normal saline.  Patient with increase in oral fluid intake.  · Renal ultrasound 9/20/2019 with no evidence of obstructive uropathy.  · Diminished oral intake due to nausea from Ibrance, creatinine 2.79 with BUN 43 on 10/15/2019.  Received 1 L normal saline.  Repeat labs on 10/18/2019 with BUN 31, creatinine 2.0.  · Today, BUN 31, creatinine 2.2.  We will have her return in 2 weeks and again in 3 weeks for CBC with stat CMP  and possible IV fluid support.  4. CHF with mild to moderate aortic stenosis:  · Recent cardiology evaluation with echocardiogram 7/12/2019 showing ejection fraction 48% with moderate dilation of the LV cavity, global hypokinesis, grade 2 diastolic dysfunction, mild to moderate aortic stenosis, trivial pericardial effusion.  · CT chest 7/12/2019 with no change in the aorta compared to prior study 12/13/2017.  5. Left upper and bilateral lower extremity peripheral neuropathy:  · Patient reports that left upper extremity neuropathy was not present from previous chemotherapy but occurred after hospitalization that required intubation and critical care stay.  Apparently felt to represent critical care neuropathy.  Improved over time.  · Bilateral lower extremity neuropathy felt to be related to diabetes  · May have future implications regarding treatment for breast cancer.  6. Uterine/endometrial activity on PET scan:  · Patient experienced postmenopausal vaginal bleeding in 2013, negative endometrial biopsy and gynecologic evaluation.  · With findings on PET scan with enlarging uterus and some hypermetabolic activity, referred back to Dr. Oliveros and gynecology.    · 9/19/2019 D&C with hysteroscopy by Dr. Oliveros, no significant intraoperative findings, pathologic results with benign findings, no evidence of malignancy.  7. Nausea:  · More significant at the end of cycle 2 Ibrance, no emesis, relieved with Zofran..  8. Myelosuppression secondary to Ibrance:  · With cycle 1, jessica WBC 2.49 with ANC 1.25 and platelet count 140,000.  · With cycle 2, jessica WBC 2.33 with ANC 1.06, maintained normal platelet count  · Today, WBC 4.34 ANC 2.12 with platelet count 230,000.     Plan:  1. Continue Aromasin 25 mg daily.    2. Continue Ibrance 100 mg daily for 21/28 days (initiated cycle 3 on 10/21/2019).  3. Continue oral folic acid 1 mg daily  4. Continue with increased oral fluid intake  5. In 2 weeks CBC, CMP with RN review and  possible IV fluid support  6. In 3 weeks CBC, CMP, with RN review and possible IV fluid support  7. In 4 weeks MD visit with CBC, CMP and begin cycle 4 Aromasin/Ibrance.  Plan repeat scans at the end of cycle 4 as well as repeat CA 15-3.    Patient continues on high risk medication requiring intensive monitoring.

## 2019-10-31 RX ORDER — CARVEDILOL 3.12 MG/1
TABLET ORAL
Qty: 60 TABLET | Refills: 3 | Status: SHIPPED | OUTPATIENT
Start: 2019-10-31 | End: 2020-02-19 | Stop reason: SDUPTHER

## 2019-11-06 ENCOUNTER — INFUSION (OUTPATIENT)
Dept: ONCOLOGY | Facility: HOSPITAL | Age: 59
End: 2019-11-06

## 2019-11-06 VITALS
OXYGEN SATURATION: 92 % | SYSTOLIC BLOOD PRESSURE: 145 MMHG | TEMPERATURE: 98 F | DIASTOLIC BLOOD PRESSURE: 65 MMHG | HEART RATE: 80 BPM | WEIGHT: 293 LBS | BODY MASS INDEX: 57.34 KG/M2

## 2019-11-06 DIAGNOSIS — C50.919 METASTATIC BREAST CANCER: ICD-10-CM

## 2019-11-06 DIAGNOSIS — R79.89 ELEVATED SERUM CREATININE: Primary | ICD-10-CM

## 2019-11-06 LAB
ALBUMIN SERPL-MCNC: 4 G/DL (ref 3.5–5.2)
ALBUMIN/GLOB SERPL: 1 G/DL (ref 1.1–2.4)
ALP SERPL-CCNC: 77 U/L (ref 38–116)
ALT SERPL W P-5'-P-CCNC: 8 U/L (ref 0–33)
ANION GAP SERPL CALCULATED.3IONS-SCNC: 13.2 MMOL/L (ref 5–15)
AST SERPL-CCNC: 10 U/L (ref 0–32)
BILIRUB SERPL-MCNC: 0.7 MG/DL (ref 0.2–1.2)
BUN BLD-MCNC: 35 MG/DL (ref 6–20)
BUN/CREAT SERPL: 14.9 (ref 7.3–30)
CALCIUM SPEC-SCNC: 9.2 MG/DL (ref 8.5–10.2)
CHLORIDE SERPL-SCNC: 102 MMOL/L (ref 98–107)
CO2 SERPL-SCNC: 26.8 MMOL/L (ref 22–29)
CREAT BLD-MCNC: 2.35 MG/DL (ref 0.6–1.1)
DEPRECATED RDW RBC AUTO: 58.8 FL (ref 37–54)
EOSINOPHIL # BLD MANUAL: 0.11 10*3/MM3 (ref 0–0.4)
EOSINOPHIL NFR BLD MANUAL: 3 % (ref 0.3–6.2)
ERYTHROCYTE [DISTWIDTH] IN BLOOD BY AUTOMATED COUNT: 16.5 % (ref 12.3–15.4)
GFR SERPL CREATININE-BSD FRML MDRD: 21 ML/MIN/1.73
GLOBULIN UR ELPH-MCNC: 3.9 GM/DL (ref 1.8–3.5)
GLUCOSE BLD-MCNC: 163 MG/DL (ref 74–124)
HCT VFR BLD AUTO: 31.2 % (ref 34–46.6)
HGB BLD-MCNC: 9.9 G/DL (ref 12–15.9)
LYMPHOCYTES # BLD MANUAL: 1.01 10*3/MM3 (ref 0.7–3.1)
LYMPHOCYTES NFR BLD MANUAL: 10 % (ref 5–12)
LYMPHOCYTES NFR BLD MANUAL: 28 % (ref 19.6–45.3)
MCH RBC QN AUTO: 31.5 PG (ref 26.6–33)
MCHC RBC AUTO-ENTMCNC: 31.7 G/DL (ref 31.5–35.7)
MCV RBC AUTO: 99.4 FL (ref 79–97)
MONOCYTES # BLD AUTO: 0.36 10*3/MM3 (ref 0.1–0.9)
NEUTROPHILS # BLD AUTO: 2.13 10*3/MM3 (ref 1.7–7)
NEUTROPHILS NFR BLD MANUAL: 54 % (ref 42.7–76)
NEUTS BAND NFR BLD MANUAL: 5 % (ref 0–5)
PLAT MORPH BLD: NORMAL
PLATELET # BLD AUTO: 241 10*3/MM3 (ref 140–450)
PMV BLD AUTO: 8.9 FL (ref 6–12)
POTASSIUM BLD-SCNC: 5 MMOL/L (ref 3.5–4.7)
PROT SERPL-MCNC: 7.9 G/DL (ref 6.3–8)
RBC # BLD AUTO: 3.14 10*6/MM3 (ref 3.77–5.28)
RBC MORPH BLD: NORMAL
SODIUM BLD-SCNC: 142 MMOL/L (ref 134–145)
WBC MORPH BLD: NORMAL
WBC NRBC COR # BLD: 3.61 10*3/MM3 (ref 3.4–10.8)

## 2019-11-06 PROCEDURE — 80053 COMPREHEN METABOLIC PANEL: CPT

## 2019-11-06 PROCEDURE — 85025 COMPLETE CBC W/AUTO DIFF WBC: CPT

## 2019-11-06 PROCEDURE — 96360 HYDRATION IV INFUSION INIT: CPT | Performed by: INTERNAL MEDICINE

## 2019-11-06 PROCEDURE — 96361 HYDRATE IV INFUSION ADD-ON: CPT | Performed by: INTERNAL MEDICINE

## 2019-11-06 PROCEDURE — 85007 BL SMEAR W/DIFF WBC COUNT: CPT

## 2019-11-06 RX ORDER — SODIUM CHLORIDE 9 MG/ML
1000 INJECTION, SOLUTION INTRAVENOUS ONCE
Status: CANCELLED | OUTPATIENT
Start: 2019-11-06

## 2019-11-06 RX ADMIN — SODIUM CHLORIDE 1000 ML: 900 INJECTION, SOLUTION INTRAVENOUS at 14:23

## 2019-11-06 NOTE — PROGRESS NOTES
Pt labs resulted with increase in Creatinine.  Consulted Dr Irvin, pt receiving 1L of fluids and encouraged to drink more even when nauseated.  Pt did not wish to change nausea medication at this time.

## 2019-11-11 NOTE — TELEPHONE ENCOUNTER
Called and spoke with her.  Added Coreg 3.125 mg bid to the Amlodipine.  She has advanced kidney disease, which limits her choices.  I will schedule her for a 2 week BP check in the office, and she will bring her machine to compare.  Thanks    GILLIAN   n/a

## 2019-11-13 ENCOUNTER — APPOINTMENT (OUTPATIENT)
Dept: ONCOLOGY | Facility: HOSPITAL | Age: 59
End: 2019-11-13

## 2019-11-13 ENCOUNTER — INFUSION (OUTPATIENT)
Dept: ONCOLOGY | Facility: HOSPITAL | Age: 59
End: 2019-11-13

## 2019-11-13 DIAGNOSIS — C50.919 METASTATIC BREAST CANCER: ICD-10-CM

## 2019-11-13 LAB
ALBUMIN SERPL-MCNC: 3.9 G/DL (ref 3.5–5.2)
ALBUMIN/GLOB SERPL: 1 G/DL (ref 1.1–2.4)
ALP SERPL-CCNC: 76 U/L (ref 38–116)
ALT SERPL W P-5'-P-CCNC: 8 U/L (ref 0–33)
ANION GAP SERPL CALCULATED.3IONS-SCNC: 12 MMOL/L (ref 5–15)
AST SERPL-CCNC: 12 U/L (ref 0–32)
BASOPHILS # BLD AUTO: 0.09 10*3/MM3 (ref 0–0.2)
BASOPHILS NFR BLD AUTO: 2.8 % (ref 0–1.5)
BILIRUB SERPL-MCNC: 0.8 MG/DL (ref 0.2–1.2)
BUN BLD-MCNC: 31 MG/DL (ref 6–20)
BUN/CREAT SERPL: 14 (ref 7.3–30)
CALCIUM SPEC-SCNC: 8.9 MG/DL (ref 8.5–10.2)
CHLORIDE SERPL-SCNC: 100 MMOL/L (ref 98–107)
CO2 SERPL-SCNC: 27 MMOL/L (ref 22–29)
CREAT BLD-MCNC: 2.21 MG/DL (ref 0.6–1.1)
DEPRECATED RDW RBC AUTO: 61.1 FL (ref 37–54)
EOSINOPHIL # BLD AUTO: 0.12 10*3/MM3 (ref 0–0.4)
EOSINOPHIL NFR BLD AUTO: 3.8 % (ref 0.3–6.2)
ERYTHROCYTE [DISTWIDTH] IN BLOOD BY AUTOMATED COUNT: 17 % (ref 12.3–15.4)
GFR SERPL CREATININE-BSD FRML MDRD: 23 ML/MIN/1.73
GLOBULIN UR ELPH-MCNC: 3.9 GM/DL (ref 1.8–3.5)
GLUCOSE BLD-MCNC: 244 MG/DL (ref 74–124)
HCT VFR BLD AUTO: 30.8 % (ref 34–46.6)
HGB BLD-MCNC: 9.9 G/DL (ref 12–15.9)
IMM GRANULOCYTES # BLD AUTO: 0.02 10*3/MM3 (ref 0–0.05)
IMM GRANULOCYTES NFR BLD AUTO: 0.6 % (ref 0–0.5)
LYMPHOCYTES # BLD AUTO: 0.91 10*3/MM3 (ref 0.7–3.1)
LYMPHOCYTES NFR BLD AUTO: 28.8 % (ref 19.6–45.3)
MCH RBC QN AUTO: 32 PG (ref 26.6–33)
MCHC RBC AUTO-ENTMCNC: 32.1 G/DL (ref 31.5–35.7)
MCV RBC AUTO: 99.7 FL (ref 79–97)
MONOCYTES # BLD AUTO: 0.28 10*3/MM3 (ref 0.1–0.9)
MONOCYTES NFR BLD AUTO: 8.9 % (ref 5–12)
NEUTROPHILS # BLD AUTO: 1.74 10*3/MM3 (ref 1.7–7)
NEUTROPHILS NFR BLD AUTO: 55.1 % (ref 42.7–76)
NRBC BLD AUTO-RTO: 0 /100 WBC (ref 0–0.2)
PLATELET # BLD AUTO: 155 10*3/MM3 (ref 140–450)
PMV BLD AUTO: 9.3 FL (ref 6–12)
POTASSIUM BLD-SCNC: 5.3 MMOL/L (ref 3.5–4.7)
PROT SERPL-MCNC: 7.8 G/DL (ref 6.3–8)
RBC # BLD AUTO: 3.09 10*6/MM3 (ref 3.77–5.28)
SODIUM BLD-SCNC: 139 MMOL/L (ref 134–145)
WBC NRBC COR # BLD: 3.16 10*3/MM3 (ref 3.4–10.8)

## 2019-11-13 PROCEDURE — 80053 COMPREHEN METABOLIC PANEL: CPT

## 2019-11-13 PROCEDURE — 85025 COMPLETE CBC W/AUTO DIFF WBC: CPT

## 2019-11-20 ENCOUNTER — INFUSION (OUTPATIENT)
Dept: LAB | Facility: HOSPITAL | Age: 59
End: 2019-11-20

## 2019-11-20 ENCOUNTER — OFFICE VISIT (OUTPATIENT)
Dept: ONCOLOGY | Facility: CLINIC | Age: 59
End: 2019-11-20

## 2019-11-20 VITALS
BODY MASS INDEX: 44.41 KG/M2 | WEIGHT: 293 LBS | HEART RATE: 80 BPM | RESPIRATION RATE: 16 BRPM | TEMPERATURE: 98.6 F | DIASTOLIC BLOOD PRESSURE: 58 MMHG | HEIGHT: 68 IN | SYSTOLIC BLOOD PRESSURE: 132 MMHG | OXYGEN SATURATION: 96 %

## 2019-11-20 DIAGNOSIS — C50.919 METASTATIC BREAST CANCER: Primary | ICD-10-CM

## 2019-11-20 DIAGNOSIS — C50.919 METASTATIC BREAST CANCER: ICD-10-CM

## 2019-11-20 LAB
25(OH)D3 SERPL-MCNC: 27.1 NG/ML (ref 30–100)
ALBUMIN SERPL-MCNC: 4 G/DL (ref 3.5–5.2)
ALBUMIN/GLOB SERPL: 1.1 G/DL (ref 1.1–2.4)
ALP SERPL-CCNC: 76 U/L (ref 38–116)
ALT SERPL W P-5'-P-CCNC: 8 U/L (ref 0–33)
ANION GAP SERPL CALCULATED.3IONS-SCNC: 12.8 MMOL/L (ref 5–15)
AST SERPL-CCNC: 12 U/L (ref 0–32)
BASOPHILS # BLD AUTO: 0.08 10*3/MM3 (ref 0–0.2)
BASOPHILS NFR BLD AUTO: 1.8 % (ref 0–1.5)
BILIRUB SERPL-MCNC: 0.6 MG/DL (ref 0.2–1.2)
BUN BLD-MCNC: 36 MG/DL (ref 6–20)
BUN/CREAT SERPL: 17.2 (ref 7.3–30)
CALCIUM SPEC-SCNC: 8.9 MG/DL (ref 8.5–10.2)
CHLORIDE SERPL-SCNC: 104 MMOL/L (ref 98–107)
CO2 SERPL-SCNC: 24.2 MMOL/L (ref 22–29)
CREAT BLD-MCNC: 2.09 MG/DL (ref 0.6–1.1)
DEPRECATED RDW RBC AUTO: 62.2 FL (ref 37–54)
EOSINOPHIL # BLD AUTO: 0.11 10*3/MM3 (ref 0–0.4)
EOSINOPHIL NFR BLD AUTO: 2.5 % (ref 0.3–6.2)
ERYTHROCYTE [DISTWIDTH] IN BLOOD BY AUTOMATED COUNT: 16.6 % (ref 12.3–15.4)
GFR SERPL CREATININE-BSD FRML MDRD: 24 ML/MIN/1.73
GLOBULIN UR ELPH-MCNC: 3.7 GM/DL (ref 1.8–3.5)
GLUCOSE BLD-MCNC: 261 MG/DL (ref 74–124)
HCT VFR BLD AUTO: 31.9 % (ref 34–46.6)
HGB BLD-MCNC: 10.3 G/DL (ref 12–15.9)
IMM GRANULOCYTES # BLD AUTO: 0.04 10*3/MM3 (ref 0–0.05)
IMM GRANULOCYTES NFR BLD AUTO: 0.9 % (ref 0–0.5)
LYMPHOCYTES # BLD AUTO: 1.29 10*3/MM3 (ref 0.7–3.1)
LYMPHOCYTES NFR BLD AUTO: 29.7 % (ref 19.6–45.3)
MCH RBC QN AUTO: 32.5 PG (ref 26.6–33)
MCHC RBC AUTO-ENTMCNC: 32.3 G/DL (ref 31.5–35.7)
MCV RBC AUTO: 100.6 FL (ref 79–97)
MONOCYTES # BLD AUTO: 0.56 10*3/MM3 (ref 0.1–0.9)
MONOCYTES NFR BLD AUTO: 12.9 % (ref 5–12)
NEUTROPHILS # BLD AUTO: 2.26 10*3/MM3 (ref 1.7–7)
NEUTROPHILS NFR BLD AUTO: 52.2 % (ref 42.7–76)
NRBC BLD AUTO-RTO: 0 /100 WBC (ref 0–0.2)
PLATELET # BLD AUTO: 198 10*3/MM3 (ref 140–450)
PMV BLD AUTO: 9.3 FL (ref 6–12)
POTASSIUM BLD-SCNC: 4.8 MMOL/L (ref 3.5–4.7)
PROT SERPL-MCNC: 7.7 G/DL (ref 6.3–8)
PTH-INTACT SERPL-MCNC: 60.4 PG/ML (ref 15–65)
RBC # BLD AUTO: 3.17 10*6/MM3 (ref 3.77–5.28)
SODIUM BLD-SCNC: 141 MMOL/L (ref 134–145)
WBC NRBC COR # BLD: 4.34 10*3/MM3 (ref 3.4–10.8)

## 2019-11-20 PROCEDURE — 85025 COMPLETE CBC W/AUTO DIFF WBC: CPT

## 2019-11-20 PROCEDURE — 99214 OFFICE O/P EST MOD 30 MIN: CPT | Performed by: INTERNAL MEDICINE

## 2019-11-20 PROCEDURE — 36415 COLL VENOUS BLD VENIPUNCTURE: CPT

## 2019-11-20 PROCEDURE — 80053 COMPREHEN METABOLIC PANEL: CPT

## 2019-11-20 PROCEDURE — 83970 ASSAY OF PARATHORMONE: CPT | Performed by: INTERNAL MEDICINE

## 2019-11-20 PROCEDURE — 82306 VITAMIN D 25 HYDROXY: CPT | Performed by: INTERNAL MEDICINE

## 2019-11-20 NOTE — PROGRESS NOTES
Subjective .     REASONS FOR FOLLOWUP:    1. Metastatic lobular breast cancer (ER/HI positive, HER-2/tj negative):  · History of stage IIIC right breast cancer ([id number])  · Patient treated previously in the [facility] system  · Diagnosis via excisional biopsy 8/23/2003 with lobular carcinoma, 4.5 cm, positive margins.  ER/HI positive, HER-2/tj negative.  · Staging evaluation in September 2003 with negative bone scan and negative CT scans.  Ejection fraction 65%.  · Received neoadjuvant chemotherapy (? GET regimen) which apparently included Taxotere and possibly epirubicin.  Received 6 cycles.  · 1/22/2004 bilateral mastectomy with right axillary dissection.  Per available records, 10-12 cm residual invasive lobular carcinoma in the breast with 14/16 lymph nodes positive with extranodal extension.  Immediate reconstruction.  · Received adjuvant chemotherapy with carboplatin and navelbine x4 cycles  · Initiated adjuvant tamoxifen 6/22/2004  · Adjuvant radiation therapy initiated but interrupted due to chest wall cellulitis requiring removal of implants in August 2004  · Implant reconstruction again attempted with MRSA infection, implant removed 12/30/2005 with no further attempts made and reconstruction.  · Completed 5 years tamoxifen in June 2009 and subsequently transitioned to Femara.  Received Femara until June 2014.  · Patient experienced postmenopausal vaginal bleeding in 2013, negative endometrial biopsy and gynecologic evaluation.  · Patient last seen in [facility] 6/18/2014  · CT chest performed to evaluate bicuspid aortic valve and dilated asending aorta 7/12/2019.  CT showed no change in aorta however showed new free intraperitoneal fluid in the upper abdomen with mesenteric edema, concerning for carcinomatosis.  · CT abdomen and pelvis (without IV contrast) on 7/16/2019 with mesenteric thickening, small volume of complex fluid in the mesentery which was suspicious and possibly representative of  carcinomatosis, small amount of perihepatic fluid, small bowel loops tethered to omentum without obstruction, omental thickening, shotty retroperitoneal and pelvic lymph nodes (non-pathologically enlarged).  · PET scan 7/26/2019 with hypermetabolic omental activity, hypermetabolic small retroperitoneal lymph nodes, hypermetabolic activity throughout uterus with increase in size.  · CT-guided omental biopsy 7/30/2019 with metastatic lobular carcinoma of breast primary, ER positive (greater than 95%), NC positive (90%), HER-2/tj negative (1+ IHC)  · Baseline CA 15-3 on 7/22/19 was 32.6  · Initiation of Aromasin 25 mg daily 8/12/2019.  Initiated Ibrance on 8/26/2019 at 100 mg 21/28 days on 8/26/2019.    · Response on CT scans 10/16/2019 following 2 cycles of Ibrance/Aromasin.  2. Anemia:  · Normocytic anemia, hemoglobin in 10-11 range  · Anemia evaluation 7/22/2019 with iron 30, ferritin 85.2, iron saturation 10%, TIBC 311, B12 370, erythropoietin level 20.4  · Anemia felt in large part to be related to CKD3 as well as to underlying malignancy  · Evidence of folate deficiency 8/12/2019 with level 3.84, initiated folic acid 1 mg daily  · If hemoglobin consistently below 10 consider use of Procrit      HISTORY OF PRESENT ILLNESS:  The patient is a 59 y.o. year old female who is here for follow-up with the above-mentioned history.    History of Present Illness   patient returns today in follow-up continuing on Aromasin 25 mg daily and Ibrance 100 mg daily for 21/28 days.  She initiated cycle 4 Ibrance on 11/18/2019.  The patient continues to tolerate treatment extremely well.  During cycle 3, she did have one episode of increase in creatinine to 2.35 on 11/6/2019 and received 1 L normal saline.  She has been trying to keep up with adequate fluid intake.  She notes some mild nausea however Zofran usually relieves this.  She has had no emesis.  She denies any diarrhea.  She notes only minimal fatigue.  She has no other  significant complaints today.  She denies any abdominal pain.    Past Medical History:   Diagnosis Date   • Anemia    • Anxiety and depression    • Bicuspid aortic valve    • Breast cancer (CMS/HCC)     RIGHT, HAD NEELA. MASTECTOMY, CHEMO AND RADIATION   • CKD (chronic kidney disease)    • Diabetes mellitus (CMS/HCC)    • Frequent UTI    • Heart murmur    • History of kidney stones    • History of MRSA infection     POST BREAST SURGERY-TREATED [facility]   • History of sepsis     KIDNEY STONE   • Hyperlipidemia    • Hypertension    • Hyperthyroidism    • Hypothyroidism    • Lymphedema of leg    • Metastasis from breast cancer (CMS/HCC)     STOMACH-OMENTUM   • Neuromuscular disorder (CMS/HCC)    • Obesity    • ANDREW on CPAP    • Osteoarthrosis    • Radiculopathy    • Thickened endometrium    • Tremor      Past Surgical History:   Procedure Laterality Date   • BREAST RECONSTRUCTION      WITH IMPLANTS, AND REVISION, NOW REMOVED   • BREAST SURGERY     •  SECTION  1987   •  SECTION  1987   • D&C HYSTEROSCOPY N/A 2017    Procedure: DILATATION AND CURETTAGE, HYSTEROSCOPY ;  Surgeon: Cherie Oliveros MD;  [place] [facility] [place] [facility] [place]:    • D&C HYSTEROSCOPY N/A 2019    Procedure: DILATATION AND CURETTAGE HYSTEROSCOPY/POLYPECTOMY;  Surgeon: Cherie Oliveros MD;  [place] [facility] [place] [facility] [place]  Service: Gynecology   • LYMPH NODE BIOPSY     • MASTECTOMY Bilateral 2003   • MASTECTOMY Bilateral          ONCOLOGIC HISTORY:  (History from previous dates can be found in the separate document.)    Current Outpatient Medications on File Prior to Visit   Medication Sig Dispense Refill   • amLODIPine (NORVASC) 10 MG tablet Take 10 mg by mouth Every Morning.     • aspirin 81 MG EC tablet Take 81 mg by mouth Daily. HELD FOR SURGERY     • atorvastatin (LIPITOR) 40 MG tablet Take 1 tablet by mouth Daily. 90 tablet 1   • carvedilol (COREG) 3.125 MG tablet Take 3.125 mg by mouth 2  (Two) Times a Day With Meals.     • carvedilol (COREG) 3.125 MG tablet TAKE ONE TABLET BY MOUTH TWICE A DAY 60 tablet 3   • Dulaglutide 1.5 MG/0.5ML solution pen-injector Inject 1.5 mg under the skin into the appropriate area as directed 1 (One) Time Per Week. MONDAYS 12 pen 1   • DULoxetine (CYMBALTA) 30 MG capsule Take 3 capsules by mouth Daily. 270 capsule 1   • exemestane (AROMASIN) 25 MG chemo tablet Take 1 tablet by mouth Daily. 30 tablet 4   • folic acid (FOLVITE) 1 MG tablet Take 1 tablet by mouth Daily. 30 tablet 3   • gabapentin (NEURONTIN) 300 MG capsule Take 1 capsule by mouth 4 (Four) Times a Day. 360 capsule 1   • LEVEMIR FLEXTOUCH 100 UNIT/ML injection Inject 50 Units under the skin into the appropriate area as directed 2 (Two) Times a Day. 3 pen 5   • levothyroxine (SYNTHROID, LEVOTHROID) 50 MCG tablet Take 1 tablet by mouth Daily. 90 tablet 1   • ondansetron (ZOFRAN) 8 MG tablet Take 1 tablet by mouth Every 8 (Eight) Hours As Needed for Nausea or Vomiting. 30 tablet 2   • Palbociclib (IBRANCE) 100 MG capsule capsule Take 1 capsule by mouth Daily. For 21 days on then 7 days off. 21 capsule 6   • repaglinide (PRANDIN) 2 MG tablet Take 1 tablet by mouth 3 (Three) Times a Day Before Meals. 270 tablet 1   • vitamin D (ERGOCALCIFEROL) 21181 units capsule capsule 50,000 Units Every 7 (Seven) Days.       No current facility-administered medications on file prior to visit.        ALLERGIES:   No Known Allergies    Social History     Socioeconomic History   • Marital status:      Spouse name: Not on file   • Number of children: Not on file   • Years of education: Not on file   • Highest education level: Not on file   Tobacco Use   • Smoking status: Never Smoker   • Smokeless tobacco: Never Used   Substance and Sexual Activity   • Alcohol use: No   • Drug use: No   • Sexual activity: Yes     Partners: Male     Birth control/protection: Post-menopausal     Family History   Problem Relation Age of Onset    • Coronary artery disease Mother         Mother  from MI at 72   • Diabetes Mother    • Breast cancer Mother    • Hyperlipidemia Mother    • Arthritis Mother    • Cancer Mother    • Aortic aneurysm Father         Father with ruptured thoracic aortic aneurysm   • Coronary artery disease Father         Father  from MI at 74   • Heart disease Father    • Hyperlipidemia Father    • Arthritis Father    • Coronary artery disease Maternal Grandmother         MGM deceeased from MI at age 76   • Malig Hyperthermia Neg Hx               Review of Systems   Constitutional: Positive for fatigue. Negative for activity change, appetite change, fever and unexpected weight change.   HENT: Negative for congestion, mouth sores, nosebleeds, sore throat and voice change.    Respiratory: Negative for cough, shortness of breath and wheezing.    Cardiovascular: Negative for chest pain, palpitations and leg swelling.   Gastrointestinal: Positive for nausea. Negative for abdominal distention, abdominal pain, blood in stool, constipation, diarrhea and vomiting.   Endocrine: Negative for cold intolerance and heat intolerance.   Genitourinary: Negative for difficulty urinating, dysuria, frequency and hematuria.   Musculoskeletal: Negative for arthralgias, back pain, joint swelling and myalgias.   Skin: Negative for rash.   Neurological: Positive for numbness. Negative for dizziness, syncope, weakness, light-headedness and headaches.   Hematological: Negative for adenopathy. Does not bruise/bleed easily.   Psychiatric/Behavioral: Negative for confusion and sleep disturbance. The patient is not nervous/anxious.          Objective      Vitals:    19 0915   BP: 132/58   Pulse: 80   Resp: 16   Temp: 98.6 °F (37 °C)   SpO2: 96%      Current Status 2019   ECOG score 0   Pain 0/10    Physical Exam   Constitutional: She is oriented to person, place, and time. She appears well-developed and well-nourished.   HENT:    Mouth/Throat: Oropharynx is clear and moist.   Eyes: Conjunctivae are normal.   Neck: No thyromegaly present.   Cardiovascular: Normal rate and regular rhythm. Exam reveals no gallop and no friction rub.   Murmur heard.  Pulmonary/Chest: Breath sounds normal. No respiratory distress.   Abdominal: Soft. Bowel sounds are normal. She exhibits no distension. There is no tenderness.   Musculoskeletal: She exhibits edema.   Trace to 1+ chronic bilateral lower extremity edema with stasis changes noted   Lymphadenopathy:        Head (right side): No submandibular adenopathy present.     She has no cervical adenopathy.     She has no axillary adenopathy.        Right: No inguinal and no supraclavicular adenopathy present.        Left: No inguinal and no supraclavicular adenopathy present.   Neurological: She is alert and oriented to person, place, and time. She displays normal reflexes. No cranial nerve deficit. She exhibits normal muscle tone.   Skin: Skin is warm and dry. No rash noted.   Psychiatric: She has a normal mood and affect. Her behavior is normal.   Patient was examined today, unchanged from above.    RECENT LABS:  Hematology WBC   Date Value Ref Range Status   11/20/2019 4.34 3.40 - 10.80 10*3/mm3 Final   03/16/2019 7.6 3.4 - 10.8 x10E3/uL Final     RBC   Date Value Ref Range Status   11/20/2019 3.17 (L) 3.77 - 5.28 10*6/mm3 Final   03/16/2019 3.80 3.77 - 5.28 x10E6/uL Final     Hemoglobin   Date Value Ref Range Status   11/20/2019 10.3 (L) 12.0 - 15.9 g/dL Final     Hematocrit   Date Value Ref Range Status   11/20/2019 31.9 (L) 34.0 - 46.6 % Final     Platelets   Date Value Ref Range Status   11/20/2019 198 140 - 450 10*3/mm3 Final        Lab Results   Component Value Date    GLUCOSE 261 (H) 11/20/2019    BUN 36 (H) 11/20/2019    CREATININE 2.09 (C) 11/20/2019    EGFRIFNONA 24 (L) 11/20/2019    EGFRIFAFRI 35 (L) 03/16/2019    BCR 17.2 11/20/2019    K 4.8 (H) 11/20/2019    CO2 24.2 11/20/2019    CALCIUM 8.9  11/20/2019    PROTENTOTREF 7.0 03/16/2019    ALBUMIN 4.00 11/20/2019    LABIL2 1.2 03/16/2019    AST 12 11/20/2019    ALT 8 11/20/2019           Assessment/Plan    1. Metastatic lobular breast cancer (ER/MO positive, HER-2/tj negative):  · History of stage IIIC right breast cancer ([id number])  · Patient treated previously in the [facility]  · Diagnosis via excisional biopsy 8/23/2003 with lobular carcinoma, 4.5 cm, positive margins.  ER/MO positive, HER-2/tj negative.  · Staging evaluation in September 2003 with negative bone scan and negative CT scans.  Ejection fraction 65%.  · Received neoadjuvant chemotherapy (? GET regimen) which apparently included Taxotere and possibly epirubicin.  Received 6 cycles.  · 1/22/2004 bilateral mastectomy with right axillary dissection.  Per available records, 10-12 cm residual invasive lobular carcinoma in the breast with 14/16 lymph nodes positive with extranodal extension.  Immediate reconstruction.  · Received adjuvant chemotherapy with carboplatin and navelbine x4 cycles  · Initiated adjuvant tamoxifen 6/22/2004  · Adjuvant radiation therapy initiated but interrupted due to chest wall cellulitis requiring removal of implants in August 2004  · Implant reconstruction again attempted with MRSA infection, implant removed 12/30/2005 with no further attempts made and reconstruction.  · Completed 5 years tamoxifen in June 2009 and subsequently transitioned to Femara.  Received Femara until June 2014.  · Patient experienced postmenopausal vaginal bleeding in 2013, negative endometrial biopsy and gynecologic evaluation.  · Patient last seen in [facility] 6/18/2014  · CT chest performed to evaluate bicuspid aortic valve and dilated asending aorta 7/12/2019.  CT showed no change in aorta however showed new free intraperitoneal fluid in the upper abdomen with mesenteric edema, concerning for carcinomatosis.  · CT abdomen and pelvis (without IV contrast) on 7/16/2019 with  mesenteric thickening, small volume of complex fluid in the mesentery which was suspicious and possibly representative of carcinomatosis, small amount of perihepatic fluid, small bowel loops tethered to omentum without obstruction, omental thickening, shotty retroperitoneal and pelvic lymph nodes (non-pathologically enlarged).  · PET scan 7/26/2019 with hypermetabolic omental activity, hypermetabolic small retroperitoneal lymph nodes, hypermetabolic activity throughout uterus with increase in size.  · CT-guided omental biopsy 7/30/2019 with metastatic lobular carcinoma of breast primary, ER positive (greater than 95%), GA positive (90%), HER-2/tj negative (1+ IHC)  · Baseline CA 15-3 on 7/22/19 was 32.6  · Initiation of Aromasin 25 mg daily 8/12/2019.  Initiated Ibrance at 100 mg 21/28 days on 8/26/2019.  · Improvement in CA 15-3 on 9/17/19-26.3  · Following 2 cycles of Aromasin and Ibrance, CA 15-3 declined to 25.9 on 10/15/2019.  CT scan chest abdomen pelvis 10/16/2019 showed improvement in the mesenteric and omental thickening and near resolution of complex ascites.  Treatment continued.  · Patient returns today having initiated cycle 4 Ibrance (100 mg daily for 21/28 days) on 11/18/2019 and continuing on Aromasin 25 mg daily.  She is tolerating this well.  She has only mild nausea.  She has been keeping up fairly well with her fluid intake.  We discussed decreasing her frequency of laboratory monitoring with this cycle.  We will plan repeat CT in 3 weeks and I will see her in 4 weeks with CA 15-3 repeated at that time.  Assuming she is showing evidence of continued response we may elect to defer subsequent CT to a 3-month interval.  2. Normocytic anemia in the setting of CKD3:  · The patient appears to have a chronic anemia with hemoglobin in the 10-11 range with normal MCV.  · Patient has underlying CKD3 with baseline creatinine recently in the 1.5-1.8 range.  · Anemia evaluation 7/22/2019 with iron 30,  ferritin 85.2, iron saturation 10%, TIBC 311, B12 370, erythropoietin level 20.4  · Anemia felt in large part to be related to CKD3 as well as to underlying malignancy  · Evidence of folate deficiency 8/12/2019 with level 3.84, initiated folic acid 1 mg daily  · If hemoglobin consistently below 10 consider use of Procrit.   · Hemoglobin today stable at 10.3  3. CKD3 with RYAN:  · Baseline creatinine recently in 1.6-2.0 range  · On 9/10/2019, RYAN/CKD3 with creatinine up to 2.44, BUN of 40.  Received 1 L normal saline.  Patient with increase in oral fluid intake.  · Renal ultrasound 9/20/2019 with no evidence of obstructive uropathy.  · Diminished oral intake due to nausea from Ibrance, creatinine 2.79 with BUN 43 on 10/15/2019.  Received 1 L normal saline.  Repeat labs on 10/18/2019 with BUN 31, creatinine 2.0.  · During cycle 3 Ibrance, patient developed increase in creatinine on 11/6/2019 to 2.35 and received 1 L normal saline.  · Patient is maintaining adequate hydration orally at this point.  Creatinine today is at baseline 2.09, BUN 36.  We will decrease frequency of laboratory monitoring, checking labs in 2 weeks and again when she returns in 4 weeks.  4. CHF with mild to moderate aortic stenosis:  · Recent cardiology evaluation with echocardiogram 7/12/2019 showing ejection fraction 48% with moderate dilation of the LV cavity, global hypokinesis, grade 2 diastolic dysfunction, mild to moderate aortic stenosis, trivial pericardial effusion.  · CT chest 7/12/2019 with no change in the aorta compared to prior study 12/13/2017.  5. Left upper and bilateral lower extremity peripheral neuropathy:  · Patient reports that left upper extremity neuropathy was not present from previous chemotherapy but occurred after hospitalization that required intubation and critical care stay.  Apparently felt to represent critical care neuropathy.  Improved over time.  · Bilateral lower extremity neuropathy felt to be related to  diabetes  · May have future implications regarding treatment for breast cancer.  6. Uterine/endometrial activity on PET scan:  · Patient experienced postmenopausal vaginal bleeding in 2013, negative endometrial biopsy and gynecologic evaluation.  · With findings on PET scan with enlarging uterus and some hypermetabolic activity, referred back to Dr. Oliveros and gynecology.    · 9/19/2019 D&C with hysteroscopy by Dr. Oliveros, no significant intraoperative findings, pathologic results with benign findings, no evidence of malignancy.  7. Nausea:  · Relieved with Zofran..  8. Myelosuppression secondary to Ibrance:  · With cycle 1, jessica WBC 2.49 with ANC 1.25 and platelet count 140,000.  · With cycle 2, jessica WBC 2.33 with ANC 1.06, maintained normal platelet count  · Today, WBC 4.34, ANC 2.26 with platelet count 198,000     Plan:  1. Continue Aromasin 25 mg daily.    2. Continue Ibrance 100 mg daily for 21/28 days (initiated cycle 4 on 11/18/2019).  3. Continue oral folic acid 1 mg daily  4. Continue with increased oral fluid intake  5. In 2 weeks CBC, CMP with RN review and possible IV fluid support  6. In 3 weeks CT chest abdomen and pelvis  7. In 4 weeks MD visit with CBC, CMP, CA 15-3 and begin cycle 5 Aromasin/Ibrance pending scan results.    Patient continues on high risk medication requiring intensive monitoring.

## 2019-12-04 ENCOUNTER — LAB (OUTPATIENT)
Dept: LAB | Facility: HOSPITAL | Age: 59
End: 2019-12-04

## 2019-12-04 ENCOUNTER — INFUSION (OUTPATIENT)
Dept: ONCOLOGY | Facility: HOSPITAL | Age: 59
End: 2019-12-04

## 2019-12-04 VITALS
SYSTOLIC BLOOD PRESSURE: 169 MMHG | OXYGEN SATURATION: 95 % | BODY MASS INDEX: 56.27 KG/M2 | HEART RATE: 95 BPM | DIASTOLIC BLOOD PRESSURE: 68 MMHG | TEMPERATURE: 98 F | WEIGHT: 293 LBS

## 2019-12-04 DIAGNOSIS — C50.919 METASTATIC BREAST CANCER: ICD-10-CM

## 2019-12-04 DIAGNOSIS — E03.9 ACQUIRED HYPOTHYROIDISM: ICD-10-CM

## 2019-12-04 LAB
ALBUMIN SERPL-MCNC: 3.8 G/DL (ref 3.5–5.2)
ALBUMIN/GLOB SERPL: 1.1 G/DL (ref 1.1–2.4)
ALP SERPL-CCNC: 72 U/L (ref 38–116)
ALT SERPL W P-5'-P-CCNC: 7 U/L (ref 0–33)
ANION GAP SERPL CALCULATED.3IONS-SCNC: 13.2 MMOL/L (ref 5–15)
AST SERPL-CCNC: 10 U/L (ref 0–32)
BASOPHILS # BLD AUTO: 0.04 10*3/MM3 (ref 0–0.2)
BASOPHILS NFR BLD AUTO: 1.3 % (ref 0–1.5)
BILIRUB SERPL-MCNC: 0.7 MG/DL (ref 0.2–1.2)
BUN BLD-MCNC: 46 MG/DL (ref 6–20)
BUN/CREAT SERPL: 19.8 (ref 7.3–30)
CALCIUM SPEC-SCNC: 8.8 MG/DL (ref 8.5–10.2)
CHLORIDE SERPL-SCNC: 110 MMOL/L (ref 98–107)
CO2 SERPL-SCNC: 20.8 MMOL/L (ref 22–29)
CREAT BLD-MCNC: 2.32 MG/DL (ref 0.6–1.1)
DEPRECATED RDW RBC AUTO: 56.5 FL (ref 37–54)
EOSINOPHIL # BLD AUTO: 0.13 10*3/MM3 (ref 0–0.4)
EOSINOPHIL NFR BLD AUTO: 4.1 % (ref 0.3–6.2)
ERYTHROCYTE [DISTWIDTH] IN BLOOD BY AUTOMATED COUNT: 15.3 % (ref 12.3–15.4)
GFR SERPL CREATININE-BSD FRML MDRD: 21 ML/MIN/1.73
GLOBULIN UR ELPH-MCNC: 3.6 GM/DL (ref 1.8–3.5)
GLUCOSE BLD-MCNC: 192 MG/DL (ref 74–124)
HCT VFR BLD AUTO: 31 % (ref 34–46.6)
HGB BLD-MCNC: 9.9 G/DL (ref 12–15.9)
IMM GRANULOCYTES # BLD AUTO: 0.03 10*3/MM3 (ref 0–0.05)
IMM GRANULOCYTES NFR BLD AUTO: 0.9 % (ref 0–0.5)
LYMPHOCYTES # BLD AUTO: 0.94 10*3/MM3 (ref 0.7–3.1)
LYMPHOCYTES NFR BLD AUTO: 29.7 % (ref 19.6–45.3)
MCH RBC QN AUTO: 32.1 PG (ref 26.6–33)
MCHC RBC AUTO-ENTMCNC: 31.9 G/DL (ref 31.5–35.7)
MCV RBC AUTO: 100.6 FL (ref 79–97)
MONOCYTES # BLD AUTO: 0.22 10*3/MM3 (ref 0.1–0.9)
MONOCYTES NFR BLD AUTO: 6.9 % (ref 5–12)
NEUTROPHILS # BLD AUTO: 1.81 10*3/MM3 (ref 1.7–7)
NEUTROPHILS NFR BLD AUTO: 57.1 % (ref 42.7–76)
NRBC BLD AUTO-RTO: 0 /100 WBC (ref 0–0.2)
PLATELET # BLD AUTO: 213 10*3/MM3 (ref 140–450)
PMV BLD AUTO: 9.8 FL (ref 6–12)
POTASSIUM BLD-SCNC: 5.1 MMOL/L (ref 3.5–4.7)
PROT SERPL-MCNC: 7.4 G/DL (ref 6.3–8)
RBC # BLD AUTO: 3.08 10*6/MM3 (ref 3.77–5.28)
SODIUM BLD-SCNC: 144 MMOL/L (ref 134–145)
WBC NRBC COR # BLD: 3.17 10*3/MM3 (ref 3.4–10.8)

## 2019-12-04 PROCEDURE — 96360 HYDRATION IV INFUSION INIT: CPT | Performed by: NURSE PRACTITIONER

## 2019-12-04 PROCEDURE — 80053 COMPREHEN METABOLIC PANEL: CPT

## 2019-12-04 PROCEDURE — 36415 COLL VENOUS BLD VENIPUNCTURE: CPT

## 2019-12-04 PROCEDURE — 85025 COMPLETE CBC W/AUTO DIFF WBC: CPT

## 2019-12-04 RX ORDER — SODIUM CHLORIDE 9 MG/ML
1000 INJECTION, SOLUTION INTRAVENOUS ONCE
Status: COMPLETED | OUTPATIENT
Start: 2019-12-04 | End: 2019-12-04

## 2019-12-04 RX ADMIN — SODIUM CHLORIDE 1000 ML: 900 INJECTION, SOLUTION INTRAVENOUS at 14:35

## 2019-12-04 NOTE — PROGRESS NOTES
Patient here for possible IV fluids and states she has been drinking well and eating little due to having an upper respiratory infection and is on antibiotics.     Discussed CMP results with BETY Fowler. Per Sofi-give patient 1 liter NS today.   Lab Results   Component Value Date    GLUCOSE 192 (H) 12/04/2019    BUN 46 (H) 12/04/2019    CREATININE 2.32 (C) 12/04/2019    EGFRIFNONA 21 (L) 12/04/2019    EGFRIFAFRI 35 (L) 03/16/2019    BCR 19.8 12/04/2019    K 5.1 (H) 12/04/2019    CO2 20.8 (L) 12/04/2019    CALCIUM 8.8 12/04/2019    PROTENTOTREF 7.0 03/16/2019    ALBUMIN 3.80 12/04/2019    LABIL2 1.2 03/16/2019    AST 10 12/04/2019    ALT 7 12/04/2019

## 2019-12-09 RX ORDER — FOLIC ACID 1 MG/1
TABLET ORAL
Qty: 30 TABLET | Refills: 3 | Status: SHIPPED | OUTPATIENT
Start: 2019-12-09 | End: 2020-03-30

## 2019-12-11 ENCOUNTER — HOSPITAL ENCOUNTER (OUTPATIENT)
Dept: PET IMAGING | Facility: HOSPITAL | Age: 59
Discharge: HOME | End: 2019-12-11
Admitting: INTERNAL MEDICINE

## 2019-12-11 DIAGNOSIS — C50.919 METASTATIC BREAST CANCER: ICD-10-CM

## 2019-12-11 PROCEDURE — 71250 CT THORAX DX C-: CPT

## 2019-12-11 PROCEDURE — 0 DIATRIZOATE MEGLUMINE & SODIUM PER 1 ML: Performed by: INTERNAL MEDICINE

## 2019-12-11 PROCEDURE — 74176 CT ABD & PELVIS W/O CONTRAST: CPT

## 2019-12-11 RX ADMIN — DIATRIZOATE MEGLUMINE AND DIATRIZOATE SODIUM 30 ML: 660; 100 LIQUID ORAL; RECTAL at 12:45

## 2019-12-18 ENCOUNTER — OFFICE VISIT (OUTPATIENT)
Dept: ONCOLOGY | Facility: CLINIC | Age: 59
End: 2019-12-18

## 2019-12-18 ENCOUNTER — LAB (OUTPATIENT)
Dept: LAB | Facility: HOSPITAL | Age: 59
End: 2019-12-18

## 2019-12-18 VITALS
OXYGEN SATURATION: 92 % | WEIGHT: 293 LBS | RESPIRATION RATE: 16 BRPM | BODY MASS INDEX: 44.41 KG/M2 | DIASTOLIC BLOOD PRESSURE: 70 MMHG | HEART RATE: 88 BPM | HEIGHT: 68 IN | TEMPERATURE: 98.2 F | SYSTOLIC BLOOD PRESSURE: 137 MMHG

## 2019-12-18 DIAGNOSIS — C50.919 METASTATIC BREAST CANCER: Primary | ICD-10-CM

## 2019-12-18 DIAGNOSIS — D64.9 NORMOCYTIC ANEMIA: ICD-10-CM

## 2019-12-18 DIAGNOSIS — C50.919 METASTATIC BREAST CANCER: ICD-10-CM

## 2019-12-18 LAB
ALBUMIN SERPL-MCNC: 3.7 G/DL (ref 3.5–5.2)
ALBUMIN/GLOB SERPL: 1.1 G/DL (ref 1.1–2.4)
ALP SERPL-CCNC: 67 U/L (ref 38–116)
ALT SERPL W P-5'-P-CCNC: 9 U/L (ref 0–33)
ANION GAP SERPL CALCULATED.3IONS-SCNC: 11.4 MMOL/L (ref 5–15)
AST SERPL-CCNC: 12 U/L (ref 0–32)
BASOPHILS # BLD AUTO: 0.04 10*3/MM3 (ref 0–0.2)
BASOPHILS NFR BLD AUTO: 1.1 % (ref 0–1.5)
BILIRUB SERPL-MCNC: 0.6 MG/DL (ref 0.2–1.2)
BUN BLD-MCNC: 30 MG/DL (ref 6–20)
BUN/CREAT SERPL: 15.1 (ref 7.3–30)
CALCIUM SPEC-SCNC: 8.6 MG/DL (ref 8.5–10.2)
CANCER AG15-3 SERPL-ACNC: 23.9 U/ML
CHLORIDE SERPL-SCNC: 105 MMOL/L (ref 98–107)
CO2 SERPL-SCNC: 24.6 MMOL/L (ref 22–29)
CREAT BLD-MCNC: 1.99 MG/DL (ref 0.6–1.1)
DEPRECATED RDW RBC AUTO: 57.4 FL (ref 37–54)
EOSINOPHIL # BLD AUTO: 0.1 10*3/MM3 (ref 0–0.4)
EOSINOPHIL NFR BLD AUTO: 2.8 % (ref 0.3–6.2)
ERYTHROCYTE [DISTWIDTH] IN BLOOD BY AUTOMATED COUNT: 15.1 % (ref 12.3–15.4)
FERRITIN SERPL-MCNC: 82.2 NG/ML (ref 13–150)
GFR SERPL CREATININE-BSD FRML MDRD: 25 ML/MIN/1.73
GLOBULIN UR ELPH-MCNC: 3.5 GM/DL (ref 1.8–3.5)
GLUCOSE BLD-MCNC: 206 MG/DL (ref 74–124)
HCT VFR BLD AUTO: 30.1 % (ref 34–46.6)
HGB BLD-MCNC: 9.4 G/DL (ref 12–15.9)
IMM GRANULOCYTES # BLD AUTO: 0.05 10*3/MM3 (ref 0–0.05)
IMM GRANULOCYTES NFR BLD AUTO: 1.4 % (ref 0–0.5)
IRON 24H UR-MRATE: 73 MCG/DL (ref 37–145)
IRON SATN MFR SERPL: 25 % (ref 14–48)
LYMPHOCYTES # BLD AUTO: 1.04 10*3/MM3 (ref 0.7–3.1)
LYMPHOCYTES NFR BLD AUTO: 28.8 % (ref 19.6–45.3)
MCH RBC QN AUTO: 32.2 PG (ref 26.6–33)
MCHC RBC AUTO-ENTMCNC: 31.2 G/DL (ref 31.5–35.7)
MCV RBC AUTO: 103.1 FL (ref 79–97)
MONOCYTES # BLD AUTO: 0.44 10*3/MM3 (ref 0.1–0.9)
MONOCYTES NFR BLD AUTO: 12.2 % (ref 5–12)
NEUTROPHILS # BLD AUTO: 1.94 10*3/MM3 (ref 1.7–7)
NEUTROPHILS NFR BLD AUTO: 53.7 % (ref 42.7–76)
NRBC BLD AUTO-RTO: 0 /100 WBC (ref 0–0.2)
PLATELET # BLD AUTO: 165 10*3/MM3 (ref 140–450)
PMV BLD AUTO: 9.3 FL (ref 6–12)
POTASSIUM BLD-SCNC: 5.3 MMOL/L (ref 3.5–4.7)
PROT SERPL-MCNC: 7.2 G/DL (ref 6.3–8)
RBC # BLD AUTO: 2.92 10*6/MM3 (ref 3.77–5.28)
SODIUM BLD-SCNC: 141 MMOL/L (ref 134–145)
TIBC SERPL-MCNC: 290 MCG/DL (ref 249–505)
TRANSFERRIN SERPL-MCNC: 207 MG/DL (ref 200–360)
WBC NRBC COR # BLD: 3.61 10*3/MM3 (ref 3.4–10.8)

## 2019-12-18 PROCEDURE — 80053 COMPREHEN METABOLIC PANEL: CPT

## 2019-12-18 PROCEDURE — 82728 ASSAY OF FERRITIN: CPT | Performed by: INTERNAL MEDICINE

## 2019-12-18 PROCEDURE — 83540 ASSAY OF IRON: CPT | Performed by: INTERNAL MEDICINE

## 2019-12-18 PROCEDURE — 86300 IMMUNOASSAY TUMOR CA 15-3: CPT | Performed by: INTERNAL MEDICINE

## 2019-12-18 PROCEDURE — 99215 OFFICE O/P EST HI 40 MIN: CPT | Performed by: INTERNAL MEDICINE

## 2019-12-18 PROCEDURE — 84466 ASSAY OF TRANSFERRIN: CPT | Performed by: INTERNAL MEDICINE

## 2019-12-18 PROCEDURE — 85025 COMPLETE CBC W/AUTO DIFF WBC: CPT

## 2019-12-18 PROCEDURE — 36415 COLL VENOUS BLD VENIPUNCTURE: CPT

## 2019-12-18 NOTE — PROGRESS NOTES
Subjective .     REASONS FOR FOLLOWUP:    1. Metastatic lobular breast cancer (ER/CO positive, HER-2/tj negative):  · History of stage IIIC right breast cancer ([id number])  · Patient treated previously in the [facility]  · Diagnosis via excisional biopsy 8/23/2003 with lobular carcinoma, 4.5 cm, positive margins.  ER/CO positive, HER-2/tj negative.  · Staging evaluation in September 2003 with negative bone scan and negative CT scans.  Ejection fraction 65%.  · Received neoadjuvant chemotherapy (? GET regimen) which apparently included Taxotere and possibly epirubicin.  Received 6 cycles.  · 1/22/2004 bilateral mastectomy with right axillary dissection.  Per available records, 10-12 cm residual invasive lobular carcinoma in the breast with 14/16 lymph nodes positive with extranodal extension.  Immediate reconstruction.  · Received adjuvant chemotherapy with carboplatin and navelbine x4 cycles  · Initiated adjuvant tamoxifen 6/22/2004  · Adjuvant radiation therapy initiated but interrupted due to chest wall cellulitis requiring removal of implants in August 2004  · Implant reconstruction again attempted with MRSA infection, implant removed 12/30/2005 with no further attempts made and reconstruction.  · Completed 5 years tamoxifen in June 2009 and subsequently transitioned to Femara.  Received Femara until June 2014.  · Patient experienced postmenopausal vaginal bleeding in 2013, negative endometrial biopsy and gynecologic evaluation.  · Patient last seen in [facility] 6/18/2014  · CT chest performed to evaluate bicuspid aortic valve and dilated asending aorta 7/12/2019.  CT showed no change in aorta however showed new free intraperitoneal fluid in the upper abdomen with mesenteric edema, concerning for carcinomatosis.  · CT abdomen and pelvis (without IV contrast) on 7/16/2019 with mesenteric thickening, small volume of complex fluid in the mesentery which was suspicious and possibly representative of  carcinomatosis, small amount of perihepatic fluid, small bowel loops tethered to omentum without obstruction, omental thickening, shotty retroperitoneal and pelvic lymph nodes (non-pathologically enlarged).  · PET scan 7/26/2019 with hypermetabolic omental activity, hypermetabolic small retroperitoneal lymph nodes, hypermetabolic activity throughout uterus with increase in size.  · CT-guided omental biopsy 7/30/2019 with metastatic lobular carcinoma of breast primary, ER positive (greater than 95%), IA positive (90%), HER-2/tj negative (1+ IHC)  · Baseline CA 15-3 on 7/22/19 was 32.6  · Initiation of Aromasin 25 mg daily 8/12/2019.  Initiated Ibrance on 8/26/2019 at 100 mg 21/28 days on 8/26/2019.    · Response on CT scans 10/16/2019 following 2 cycles of Ibrance/Aromasin.  · Stable findings on subsequent CT chest abdomen pelvis 12/11/2019.  Further improvement in CA 15-3-23.9 on 12/18/2019.  Ibrance/Aromasin continued.  Plan 3-month interval CT scans.  2. Anemia:  · Normocytic anemia, hemoglobin in 10-11 range  · Anemia evaluation 7/22/2019 with iron 30, ferritin 85.2, iron saturation 10%, TIBC 311, B12 370, erythropoietin level 20.4  · Anemia felt in large part to be related to CKD3 as well as to underlying malignancy  · Evidence of folate deficiency 8/12/2019 with level 3.84, initiated folic acid 1 mg daily  · If hemoglobin consistently below 10 consider use of Procrit      HISTORY OF PRESENT ILLNESS:  The patient is a 59 y.o. year old female who is here for follow-up with the above-mentioned history.    History of Present Illness   patient returns today in follow-up continuing on Aromasin 25 mg daily and Ibrance 100 mg daily for 21/28 days.  The patient has laboratory studies and CT scans to review today.  She reports that her tolerance of treatment has improved over time.  Nausea has been minimal.  She notes that she has changed her eating habits to include small more frequent meals.  This has helped.  She has  increased her oral hydration significantly.  She reports only minimal fatigue.  She has no other complaints today.  She denies any abdominal pain.    Past Medical History:   Diagnosis Date   • Anemia    • Anxiety and depression    • Bicuspid aortic valve    • Breast cancer (CMS/HCC)     RIGHT, HAD NEELA. MASTECTOMY, CHEMO AND RADIATION   • CKD (chronic kidney disease)    • Diabetes mellitus (CMS/HCC)    • Frequent UTI    • Heart murmur    • History of kidney stones    • History of MRSA infection     POST BREAST SURGERY-TREATED [facility]   • History of sepsis     KIDNEY STONE   • Hyperlipidemia    • Hypertension    • Hyperthyroidism    • Hypothyroidism    • Lymphedema of leg    • Metastasis from breast cancer (CMS/HCC)     STOMACH-OMENTUM   • Neuromuscular disorder (CMS/HCC)    • Obesity    • ANDREW on CPAP    • Osteoarthrosis    • Radiculopathy    • Thickened endometrium    • Tremor      Past Surgical History:   Procedure Laterality Date   • BREAST RECONSTRUCTION      WITH IMPLANTS, AND REVISION, NOW REMOVED   • BREAST SURGERY     •  SECTION  1987   •  SECTION  1987   • D&C HYSTEROSCOPY N/A 2017    Procedure: DILATATION AND CURETTAGE, HYSTEROSCOPY ;  Surgeon: Cherie Oliveros MD;  Location: [facility] OR [facility] [place];  Service:    • D&C HYSTEROSCOPY N/A 2019    Procedure: DILATATION AND CURETTAGE HYSTEROSCOPY/POLYPECTOMY;  Surgeon: Cherie Oliveros MD;  Location: [facility] OR [facility] [place];  Service: Gynecology   • LYMPH NODE BIOPSY     • MASTECTOMY Bilateral 2003   • MASTECTOMY Bilateral          ONCOLOGIC HISTORY:  (History from previous dates can be found in the separate document.)    Current Outpatient Medications on File Prior to Visit   Medication Sig Dispense Refill   • amLODIPine (NORVASC) 10 MG tablet Take 10 mg by mouth Every Morning.     • aspirin 81 MG EC tablet Take 81 mg by mouth Daily. HELD FOR SURGERY     • atorvastatin (LIPITOR) 40 MG tablet Take 1 tablet by mouth  Daily. 90 tablet 1   • carvedilol (COREG) 3.125 MG tablet Take 3.125 mg by mouth 2 (Two) Times a Day With Meals.     • carvedilol (COREG) 3.125 MG tablet TAKE ONE TABLET BY MOUTH TWICE A DAY 60 tablet 3   • Dulaglutide 1.5 MG/0.5ML solution pen-injector Inject 1.5 mg under the skin into the appropriate area as directed 1 (One) Time Per Week. MONDAYS 12 pen 1   • DULoxetine (CYMBALTA) 30 MG capsule Take 3 capsules by mouth Daily. 270 capsule 1   • exemestane (AROMASIN) 25 MG chemo tablet Take 1 tablet by mouth Daily. 30 tablet 4   • folic acid (FOLVITE) 1 MG tablet TAKE ONE TABLET BY MOUTH DAILY 30 tablet 3   • gabapentin (NEURONTIN) 300 MG capsule Take 1 capsule by mouth 4 (Four) Times a Day. 360 capsule 1   • LEVEMIR FLEXTOUCH 100 UNIT/ML injection Inject 50 Units under the skin into the appropriate area as directed 2 (Two) Times a Day. 3 pen 5   • levothyroxine (SYNTHROID, LEVOTHROID) 50 MCG tablet Take 1 tablet by mouth Daily. 90 tablet 1   • ondansetron (ZOFRAN) 8 MG tablet Take 1 tablet by mouth Every 8 (Eight) Hours As Needed for Nausea or Vomiting. 30 tablet 2   • Palbociclib (IBRANCE) 100 MG capsule capsule Take 1 capsule by mouth Daily. For 21 days on then 7 days off. 21 capsule 6   • repaglinide (PRANDIN) 2 MG tablet Take 1 tablet by mouth 3 (Three) Times a Day Before Meals. 270 tablet 1   • vitamin D (ERGOCALCIFEROL) 29275 units capsule capsule 50,000 Units Every 7 (Seven) Days.       No current facility-administered medications on file prior to visit.        ALLERGIES:   No Known Allergies    Social History     Socioeconomic History   • Marital status:      Spouse name: Not on file   • Number of children: Not on file   • Years of education: Not on file   • Highest education level: Not on file   Tobacco Use   • Smoking status: Never Smoker   • Smokeless tobacco: Never Used   Substance and Sexual Activity   • Alcohol use: No   • Drug use: No   • Sexual activity: Yes     Partners: Male     Birth  control/protection: Post-menopausal     Family History   Problem Relation Age of Onset   • Coronary artery disease Mother         Mother  from MI at 72   • Diabetes Mother    • Breast cancer Mother    • Hyperlipidemia Mother    • Arthritis Mother    • Cancer Mother    • Aortic aneurysm Father         Father with ruptured thoracic aortic aneurysm   • Coronary artery disease Father         Father  from MI at 74   • Heart disease Father    • Hyperlipidemia Father    • Arthritis Father    • Coronary artery disease Maternal Grandmother         MGM deceeased from MI at age 76   • Malig Hyperthermia Neg Hx               Review of Systems   Constitutional: Positive for fatigue. Negative for activity change, appetite change, fever and unexpected weight change.   HENT: Negative for congestion, mouth sores, nosebleeds, sore throat and voice change.    Respiratory: Negative for cough, shortness of breath and wheezing.    Cardiovascular: Negative for chest pain, palpitations and leg swelling.   Gastrointestinal: Positive for nausea. Negative for abdominal distention, abdominal pain, blood in stool, constipation, diarrhea and vomiting.   Endocrine: Negative for cold intolerance and heat intolerance.   Genitourinary: Negative for difficulty urinating, dysuria, frequency and hematuria.   Musculoskeletal: Negative for arthralgias, back pain, joint swelling and myalgias.   Skin: Negative for rash.   Neurological: Positive for numbness. Negative for dizziness, syncope, weakness, light-headedness and headaches.   Hematological: Negative for adenopathy. Does not bruise/bleed easily.   Psychiatric/Behavioral: Negative for confusion and sleep disturbance. The patient is not nervous/anxious.          Objective      Vitals:    19 1012   BP: 137/70   Pulse: 88   Resp: 16   Temp: 98.2 °F (36.8 °C)   SpO2: 92%      Current Status 2019   ECOG score 1   Pain 0/10    Physical Exam   Constitutional: She is oriented to  person, place, and time. She appears well-developed and well-nourished.   HENT:   Mouth/Throat: Oropharynx is clear and moist.   Eyes: Conjunctivae are normal.   Neck: No thyromegaly present.   Cardiovascular: Normal rate and regular rhythm. Exam reveals no gallop and no friction rub.   Murmur heard.  Pulmonary/Chest: Breath sounds normal. No respiratory distress.   Abdominal: Soft. Bowel sounds are normal. She exhibits no distension. There is no tenderness.   Musculoskeletal: She exhibits edema.   Trace to 1+ chronic bilateral lower extremity edema with stasis changes noted   Lymphadenopathy:        Head (right side): No submandibular adenopathy present.     She has no cervical adenopathy.     She has no axillary adenopathy.        Right: No inguinal and no supraclavicular adenopathy present.        Left: No inguinal and no supraclavicular adenopathy present.   Neurological: She is alert and oriented to person, place, and time. She displays normal reflexes. No cranial nerve deficit. She exhibits normal muscle tone.   Skin: Skin is warm and dry. No rash noted.   Psychiatric: She has a normal mood and affect. Her behavior is normal.   The patient was examined today, unchanged from above.    RECENT LABS:  Hematology WBC   Date Value Ref Range Status   12/18/2019 3.61 3.40 - 10.80 10*3/mm3 Final   03/16/2019 7.6 3.4 - 10.8 x10E3/uL Final     RBC   Date Value Ref Range Status   12/18/2019 2.92 (L) 3.77 - 5.28 10*6/mm3 Final   03/16/2019 3.80 3.77 - 5.28 x10E6/uL Final     Hemoglobin   Date Value Ref Range Status   12/18/2019 9.4 (L) 12.0 - 15.9 g/dL Final     Hematocrit   Date Value Ref Range Status   12/18/2019 30.1 (L) 34.0 - 46.6 % Final     Platelets   Date Value Ref Range Status   12/18/2019 165 140 - 450 10*3/mm3 Final        Lab Results   Component Value Date    GLUCOSE 206 (H) 12/18/2019    BUN 30 (H) 12/18/2019    CREATININE 1.99 (C) 12/18/2019    EGFRIFNONA 25 (L) 12/18/2019    EGFRIFAFRI 35 (L) 03/16/2019     BCR 15.1 12/18/2019    K 5.3 (H) 12/18/2019    CO2 24.6 12/18/2019    CALCIUM 8.6 12/18/2019    PROTENTOTREF 7.0 03/16/2019    ALBUMIN 3.70 12/18/2019    LABIL2 1.2 03/16/2019    AST 12 12/18/2019    ALT 9 12/18/2019     I did review CT chest abdomen and pelvis 12/11/2019 as noted below.  I did personally review CT images.    CA 15-3 today 23.9.      Assessment/Plan    1. Metastatic lobular breast cancer (ER/ND positive, HER-2/tj negative):  · History of stage IIIC right breast cancer ([id number])  · Patient treated previously in the [facility]  · Diagnosis via excisional biopsy 8/23/2003 with lobular carcinoma, 4.5 cm, positive margins.  ER/ND positive, HER-2/tj negative.  · Staging evaluation in September 2003 with negative bone scan and negative CT scans.  Ejection fraction 65%.  · Received neoadjuvant chemotherapy (? GET regimen) which apparently included Taxotere and possibly epirubicin.  Received 6 cycles.  · 1/22/2004 bilateral mastectomy with right axillary dissection.  Per available records, 10-12 cm residual invasive lobular carcinoma in the breast with 14/16 lymph nodes positive with extranodal extension.  Immediate reconstruction.  · Received adjuvant chemotherapy with carboplatin and navelbine x4 cycles  · Initiated adjuvant tamoxifen 6/22/2004  · Adjuvant radiation therapy initiated but interrupted due to chest wall cellulitis requiring removal of implants in August 2004  · Implant reconstruction again attempted with MRSA infection, implant removed 12/30/2005 with no further attempts made and reconstruction.  · Completed 5 years tamoxifen in June 2009 and subsequently transitioned to Femara.  Received Femara until June 2014.  · Patient experienced postmenopausal vaginal bleeding in 2013, negative endometrial biopsy and gynecologic evaluation.  · Patient last seen in [facility] 6/18/2014  · CT chest performed to evaluate bicuspid aortic valve and dilated asending aorta 7/12/2019.  CT showed no  change in aorta however showed new free intraperitoneal fluid in the upper abdomen with mesenteric edema, concerning for carcinomatosis.  · CT abdomen and pelvis (without IV contrast) on 7/16/2019 with mesenteric thickening, small volume of complex fluid in the mesentery which was suspicious and possibly representative of carcinomatosis, small amount of perihepatic fluid, small bowel loops tethered to omentum without obstruction, omental thickening, shotty retroperitoneal and pelvic lymph nodes (non-pathologically enlarged).  · PET scan 7/26/2019 with hypermetabolic omental activity, hypermetabolic small retroperitoneal lymph nodes, hypermetabolic activity throughout uterus with increase in size.  · CT-guided omental biopsy 7/30/2019 with metastatic lobular carcinoma of breast primary, ER positive (greater than 95%), CT positive (90%), HER-2/tj negative (1+ IHC)  · Baseline CA 15-3 on 7/22/19 was 32.6  · Initiation of Aromasin 25 mg daily 8/12/2019.  Initiated Ibrance at 100 mg 21/28 days on 8/26/2019.  · Improvement in CA 15-3 on 9/17/19-26.3  · Following 2 cycles of Aromasin and Ibrance, CA 15-3 declined to 25.9 on 10/15/2019.  CT scan chest abdomen pelvis 10/16/2019 showed improvement in the mesenteric and omental thickening and near resolution of complex ascites.  Treatment continued.  · Stable findings on subsequent CT chest abdomen pelvis 12/11/2019.  Further improvement in CA 15-3-23.9 on 12/18/2019.  Ibrance/Aromasin continued.  Plan 3-month interval CT scans.  · Patient returns today having initiated cycle 5 Ibrance (100 mg daily for 21/28 days) and continuing on Aromasin 25 mg daily.  She returns today with CT scans to review from 12/11/2019 showing stable findings of mesenteric stranding, no recurrent ascites, no new sites of disease.  CA 15-3 has improved further at 23.9 today.  She is tolerating treatment with minimal fatigue and nausea.  We will proceed on with treatment as planned.  She will return  in 2 weeks for CBC, CMP and RN review and I will see her in 4 weeks for follow-up.  We will anticipate repeat CT scan and CA 15-3 at a 3-month interval.  She will notify us in the meantime if she develops any new symptoms.  2. Normocytic anemia in the setting of CKD3:  · The patient appears to have a chronic anemia with hemoglobin in the 10-11 range with normal MCV.  · Patient has underlying CKD3 with baseline creatinine recently in the 1.5-1.8 range.  · Anemia evaluation 7/22/2019 with iron 30, ferritin 85.2, iron saturation 10%, TIBC 311, B12 370, erythropoietin level 20.4  · Anemia felt in large part to be related to CKD3 as well as to underlying malignancy  · Evidence of folate deficiency 8/12/2019 with level 3.84, initiated folic acid 1 mg daily  · If hemoglobin consistently below 10 consider use of Procrit.   · Hemoglobin today has declined to 9.4.  We did obtain additional labs today with iron 73, ferritin 82.2, iron saturation 25%, TIBC 290.  We did discuss the potential use of Procrit if her hemoglobin declines into the low 9/upper 8 range.  3. CKD3 with RYAN:  · Baseline creatinine recently in 1.6-2.0 range  · On 9/10/2019, RYAN/CKD3 with creatinine up to 2.44, BUN of 40.  Received 1 L normal saline.  Patient with increase in oral fluid intake.  · Renal ultrasound 9/20/2019 with no evidence of obstructive uropathy.  · Diminished oral intake due to nausea from Ibrance, creatinine 2.79 with BUN 43 on 10/15/2019.  Received 1 L normal saline.  Repeat labs on 10/18/2019 with BUN 31, creatinine 2.0.  · During cycle 3 Ibrance, patient developed increase in creatinine on 11/6/2019 to 2.35 and received 1 L normal saline.  · Patient is trying to maintain adequate oral hydration.  Today, creatinine is improved at 1.99 with BUN of 30.  She is continuing 3-month follow-up with nephrology.  4. CHF with mild to moderate aortic stenosis:  · Recent cardiology evaluation with echocardiogram 7/12/2019 showing ejection fraction  48% with moderate dilation of the LV cavity, global hypokinesis, grade 2 diastolic dysfunction, mild to moderate aortic stenosis, trivial pericardial effusion.  · CT chest 7/12/2019 with no change in the aorta compared to prior study 12/13/2017.  5. Left upper and bilateral lower extremity peripheral neuropathy:  · Patient reports that left upper extremity neuropathy was not present from previous chemotherapy but occurred after hospitalization that required intubation and critical care stay.  Apparently felt to represent critical care neuropathy.  Improved over time.  · Bilateral lower extremity neuropathy felt to be related to diabetes  · May have future implications regarding treatment for breast cancer.  6. Uterine/endometrial activity on PET scan:  · Patient experienced postmenopausal vaginal bleeding in 2013, negative endometrial biopsy and gynecologic evaluation.  · With findings on PET scan with enlarging uterus and some hypermetabolic activity, referred back to Dr. Oliveros and gynecology.    · 9/19/2019 D&C with hysteroscopy by Dr. Oliveros, no significant intraoperative findings, pathologic results with benign findings, no evidence of malignancy.  7. Nausea:  · Relieved with Zofran..  8. Myelosuppression secondary to Ibrance:  · With cycle 1, jessica WBC 2.49 with ANC 1.25 and platelet count 140,000.  · With cycle 2, jessica WBC 2.33 with ANC 1.06, maintained normal platelet count  · Today, WBC 3.61, ANC 1.94, platelet count 165,000.     Plan:  1. Continue Aromasin 25 mg daily.    2. Continue Ibrance 100 mg daily for 21/28 days, currently in the midst of cycle 5.  3. Continue oral folic acid 1 mg daily  4. Continue with increased oral fluid intake  5. In 2 weeks CBC, CMP with RN review   6. In 4 weeks MD visit with CBC, CMP and begin cycle 6 Aromasin/Ibrance.    Patient continues on high risk medication requiring intensive monitoring.

## 2020-01-02 ENCOUNTER — LAB (OUTPATIENT)
Dept: LAB | Facility: HOSPITAL | Age: 60
End: 2020-01-02

## 2020-01-02 ENCOUNTER — CLINICAL SUPPORT (OUTPATIENT)
Dept: ONCOLOGY | Facility: HOSPITAL | Age: 60
End: 2020-01-02

## 2020-01-02 DIAGNOSIS — D64.9 NORMOCYTIC ANEMIA: ICD-10-CM

## 2020-01-02 DIAGNOSIS — C50.919 METASTATIC BREAST CANCER: ICD-10-CM

## 2020-01-02 LAB
ALBUMIN SERPL-MCNC: 3.8 G/DL (ref 3.5–5.2)
ALBUMIN/GLOB SERPL: 1.1 G/DL (ref 1.1–2.4)
ALP SERPL-CCNC: 73 U/L (ref 38–116)
ALT SERPL W P-5'-P-CCNC: 9 U/L (ref 0–33)
ANION GAP SERPL CALCULATED.3IONS-SCNC: 12.4 MMOL/L (ref 5–15)
AST SERPL-CCNC: 10 U/L (ref 0–32)
BASOPHILS # BLD AUTO: 0.06 10*3/MM3 (ref 0–0.2)
BASOPHILS NFR BLD AUTO: 1.5 % (ref 0–1.5)
BILIRUB SERPL-MCNC: 0.7 MG/DL (ref 0.2–1.2)
BUN BLD-MCNC: 34 MG/DL (ref 6–20)
BUN/CREAT SERPL: 15 (ref 7.3–30)
CALCIUM SPEC-SCNC: 8.5 MG/DL (ref 8.5–10.2)
CHLORIDE SERPL-SCNC: 103 MMOL/L (ref 98–107)
CO2 SERPL-SCNC: 26.6 MMOL/L (ref 22–29)
CREAT BLD-MCNC: 2.26 MG/DL (ref 0.6–1.1)
DEPRECATED RDW RBC AUTO: 56.8 FL (ref 37–54)
EOSINOPHIL # BLD AUTO: 0.09 10*3/MM3 (ref 0–0.4)
EOSINOPHIL NFR BLD AUTO: 2.3 % (ref 0.3–6.2)
ERYTHROCYTE [DISTWIDTH] IN BLOOD BY AUTOMATED COUNT: 15 % (ref 12.3–15.4)
GFR SERPL CREATININE-BSD FRML MDRD: 22 ML/MIN/1.73
GLOBULIN UR ELPH-MCNC: 3.6 GM/DL (ref 1.8–3.5)
GLUCOSE BLD-MCNC: 189 MG/DL (ref 74–124)
HCT VFR BLD AUTO: 29.3 % (ref 34–46.6)
HGB BLD-MCNC: 9.4 G/DL (ref 12–15.9)
IMM GRANULOCYTES # BLD AUTO: 0.03 10*3/MM3 (ref 0–0.05)
IMM GRANULOCYTES NFR BLD AUTO: 0.8 % (ref 0–0.5)
LYMPHOCYTES # BLD AUTO: 0.95 10*3/MM3 (ref 0.7–3.1)
LYMPHOCYTES NFR BLD AUTO: 24.3 % (ref 19.6–45.3)
MCH RBC QN AUTO: 33.5 PG (ref 26.6–33)
MCHC RBC AUTO-ENTMCNC: 32.1 G/DL (ref 31.5–35.7)
MCV RBC AUTO: 104.3 FL (ref 79–97)
MONOCYTES # BLD AUTO: 0.25 10*3/MM3 (ref 0.1–0.9)
MONOCYTES NFR BLD AUTO: 6.4 % (ref 5–12)
NEUTROPHILS # BLD AUTO: 2.53 10*3/MM3 (ref 1.7–7)
NEUTROPHILS NFR BLD AUTO: 64.7 % (ref 42.7–76)
NRBC BLD AUTO-RTO: 0 /100 WBC (ref 0–0.2)
PLATELET # BLD AUTO: 234 10*3/MM3 (ref 140–450)
PMV BLD AUTO: 9.1 FL (ref 6–12)
POTASSIUM BLD-SCNC: 5.4 MMOL/L (ref 3.5–4.7)
PROT SERPL-MCNC: 7.4 G/DL (ref 6.3–8)
RBC # BLD AUTO: 2.81 10*6/MM3 (ref 3.77–5.28)
SODIUM BLD-SCNC: 142 MMOL/L (ref 134–145)
WBC NRBC COR # BLD: 3.91 10*3/MM3 (ref 3.4–10.8)

## 2020-01-02 PROCEDURE — 85025 COMPLETE CBC W/AUTO DIFF WBC: CPT

## 2020-01-02 PROCEDURE — 80053 COMPREHEN METABOLIC PANEL: CPT

## 2020-01-02 PROCEDURE — 36415 COLL VENOUS BLD VENIPUNCTURE: CPT

## 2020-01-02 NOTE — PROGRESS NOTES
CBC reviewed with pt. Counts stable. Pt voices no complaints. She is on her last week of Ibrance. CMP still pending.  Pt may need IVF.  Advised pt we will wait for results and once reviewed with Dr. Irvin, we would let her know if they are needed. She v/u.      CMP reviewed with Dr. Irvin. Per Dr. Irvin, no need for IVF today. No new orders in regards to potassium either.  Informed pt and she v/u.     Lab Results   Component Value Date    WBC 3.91 01/02/2020    HGB 9.4 (L) 01/02/2020    HCT 29.3 (L) 01/02/2020    .3 (H) 01/02/2020     01/02/2020

## 2020-01-13 RX ORDER — EXEMESTANE 25 MG/1
TABLET ORAL
Qty: 30 TABLET | Refills: 3 | Status: SHIPPED | OUTPATIENT
Start: 2020-01-13 | End: 2020-04-10

## 2020-01-20 ENCOUNTER — LAB (OUTPATIENT)
Dept: OTHER | Facility: HOSPITAL | Age: 60
End: 2020-01-20

## 2020-01-20 ENCOUNTER — OFFICE VISIT (OUTPATIENT)
Dept: ONCOLOGY | Facility: CLINIC | Age: 60
End: 2020-01-20

## 2020-01-20 VITALS
HEIGHT: 68 IN | RESPIRATION RATE: 16 BRPM | SYSTOLIC BLOOD PRESSURE: 170 MMHG | OXYGEN SATURATION: 97 % | DIASTOLIC BLOOD PRESSURE: 74 MMHG | TEMPERATURE: 98.2 F | HEART RATE: 69 BPM | WEIGHT: 293 LBS | BODY MASS INDEX: 44.41 KG/M2

## 2020-01-20 DIAGNOSIS — C50.919 METASTATIC BREAST CANCER: Primary | ICD-10-CM

## 2020-01-20 DIAGNOSIS — D64.9 NORMOCYTIC ANEMIA: ICD-10-CM

## 2020-01-20 DIAGNOSIS — C50.919 METASTATIC BREAST CANCER: ICD-10-CM

## 2020-01-20 LAB
ALBUMIN SERPL-MCNC: 3.9 G/DL (ref 3.5–5.2)
ALBUMIN/GLOB SERPL: 0.9 G/DL
ALP SERPL-CCNC: 74 U/L (ref 39–117)
ALT SERPL W P-5'-P-CCNC: 11 U/L (ref 1–33)
ANION GAP SERPL CALCULATED.3IONS-SCNC: 13.4 MMOL/L (ref 5–15)
AST SERPL-CCNC: 14 U/L (ref 1–32)
BASOPHILS # BLD AUTO: 0.11 10*3/MM3 (ref 0–0.2)
BASOPHILS NFR BLD AUTO: 3 % (ref 0–1.5)
BILIRUB SERPL-MCNC: 0.8 MG/DL (ref 0.1–1.2)
BUN BLD-MCNC: 43 MG/DL (ref 8–23)
BUN/CREAT SERPL: 18.1 (ref 7–25)
CALCIUM SPEC-SCNC: 9.2 MG/DL (ref 8.6–10.5)
CHLORIDE SERPL-SCNC: 104 MMOL/L (ref 98–107)
CO2 SERPL-SCNC: 23.6 MMOL/L (ref 22–29)
CREAT BLD-MCNC: 2.38 MG/DL (ref 0.57–1)
DEPRECATED RDW RBC AUTO: 54.2 FL (ref 37–54)
EOSINOPHIL # BLD AUTO: 0.11 10*3/MM3 (ref 0–0.4)
EOSINOPHIL NFR BLD AUTO: 3 % (ref 0.3–6.2)
ERYTHROCYTE [DISTWIDTH] IN BLOOD BY AUTOMATED COUNT: 14.7 % (ref 12.3–15.4)
GFR SERPL CREATININE-BSD FRML MDRD: 21 ML/MIN/1.73
GLOBULIN UR ELPH-MCNC: 4.2 GM/DL
GLUCOSE BLD-MCNC: 160 MG/DL (ref 65–99)
HCT VFR BLD AUTO: 29.3 % (ref 34–46.6)
HGB BLD-MCNC: 9.7 G/DL (ref 12–15.9)
IMM GRANULOCYTES # BLD AUTO: 0.01 10*3/MM3 (ref 0–0.05)
IMM GRANULOCYTES NFR BLD AUTO: 0.3 % (ref 0–0.5)
LYMPHOCYTES # BLD AUTO: 1.15 10*3/MM3 (ref 0.7–3.1)
LYMPHOCYTES NFR BLD AUTO: 31.1 % (ref 19.6–45.3)
MCH RBC QN AUTO: 33.1 PG (ref 26.6–33)
MCHC RBC AUTO-ENTMCNC: 33.1 G/DL (ref 31.5–35.7)
MCV RBC AUTO: 100 FL (ref 79–97)
MONOCYTES # BLD AUTO: 0.25 10*3/MM3 (ref 0.1–0.9)
MONOCYTES NFR BLD AUTO: 6.8 % (ref 5–12)
NEUTROPHILS # BLD AUTO: 2.07 10*3/MM3 (ref 1.7–7)
NEUTROPHILS NFR BLD AUTO: 55.8 % (ref 42.7–76)
NRBC BLD AUTO-RTO: 0 /100 WBC (ref 0–0.2)
PLATELET # BLD AUTO: 285 10*3/MM3 (ref 140–450)
PMV BLD AUTO: 9.8 FL (ref 6–12)
POTASSIUM BLD-SCNC: 5 MMOL/L (ref 3.5–5.2)
PROT SERPL-MCNC: 8.1 G/DL (ref 6–8.5)
RBC # BLD AUTO: 2.93 10*6/MM3 (ref 3.77–5.28)
SODIUM BLD-SCNC: 141 MMOL/L (ref 136–145)
WBC NRBC COR # BLD: 3.7 10*3/MM3 (ref 3.4–10.8)

## 2020-01-20 PROCEDURE — 36415 COLL VENOUS BLD VENIPUNCTURE: CPT

## 2020-01-20 PROCEDURE — 99214 OFFICE O/P EST MOD 30 MIN: CPT | Performed by: INTERNAL MEDICINE

## 2020-01-20 PROCEDURE — 80053 COMPREHEN METABOLIC PANEL: CPT | Performed by: INTERNAL MEDICINE

## 2020-01-20 PROCEDURE — 85025 COMPLETE CBC W/AUTO DIFF WBC: CPT | Performed by: INTERNAL MEDICINE

## 2020-01-20 NOTE — PROGRESS NOTES
Subjective .     REASONS FOR FOLLOWUP:    1. Metastatic lobular breast cancer (ER/AZ positive, HER-2/tj negative):  · History of stage IIIC right breast cancer ([id number])  · Patient treated previously in the [facility] system  · Diagnosis via excisional biopsy 8/23/2003 with lobular carcinoma, 4.5 cm, positive margins.  ER/AZ positive, HER-2/tj negative.  · Staging evaluation in September 2003 with negative bone scan and negative CT scans.  Ejection fraction 65%.  · Received neoadjuvant chemotherapy (? GET regimen) which apparently included Taxotere and possibly epirubicin.  Received 6 cycles.  · 1/22/2004 bilateral mastectomy with right axillary dissection.  Per available records, 10-12 cm residual invasive lobular carcinoma in the breast with 14/16 lymph nodes positive with extranodal extension.  Immediate reconstruction.  · Received adjuvant chemotherapy with carboplatin and navelbine x4 cycles  · Initiated adjuvant tamoxifen 6/22/2004  · Adjuvant radiation therapy initiated but interrupted due to chest wall cellulitis requiring removal of implants in August 2004  · Implant reconstruction again attempted with MRSA infection, implant removed 12/30/2005 with no further attempts made and reconstruction.  · Completed 5 years tamoxifen in June 2009 and subsequently transitioned to Femara.  Received Femara until June 2014.  · Patient experienced postmenopausal vaginal bleeding in 2013, negative endometrial biopsy and gynecologic evaluation.  · Patient last seen in [facility] 6/18/2014  · CT chest performed to evaluate bicuspid aortic valve and dilated asending aorta 7/12/2019.  CT showed no change in aorta however showed new free intraperitoneal fluid in the upper abdomen with mesenteric edema, concerning for carcinomatosis.  · CT abdomen and pelvis (without IV contrast) on 7/16/2019 with mesenteric thickening, small volume of complex fluid in the mesentery which was suspicious and possibly representative of  carcinomatosis, small amount of perihepatic fluid, small bowel loops tethered to omentum without obstruction, omental thickening, shotty retroperitoneal and pelvic lymph nodes (non-pathologically enlarged).  · PET scan 7/26/2019 with hypermetabolic omental activity, hypermetabolic small retroperitoneal lymph nodes, hypermetabolic activity throughout uterus with increase in size.  · CT-guided omental biopsy 7/30/2019 with metastatic lobular carcinoma of breast primary, ER positive (greater than 95%), FL positive (90%), HER-2/tj negative (1+ IHC)  · Baseline CA 15-3 on 7/22/19 was 32.6  · Initiation of Aromasin 25 mg daily 8/12/2019.  Initiated Ibrance on 8/26/2019 at 100 mg 21/28 days on 8/26/2019.    · Response on CT scans 10/16/2019 following 2 cycles of Ibrance/Aromasin.  · Stable findings on subsequent CT chest abdomen pelvis 12/11/2019.  Further improvement in CA 15-3-23.9 on 12/18/2019.  Ibrance/Aromasin continued.  Plan 3-month interval CT scans.  2. Anemia:  · Normocytic anemia, hemoglobin in 10-11 range  · Anemia evaluation 7/22/2019 with iron 30, ferritin 85.2, iron saturation 10%, TIBC 311, B12 370, erythropoietin level 20.4  · Anemia felt in large part to be related to CKD3 as well as to underlying malignancy  · Evidence of folate deficiency 8/12/2019 with level 3.84, initiated folic acid 1 mg daily  · If hemoglobin consistently below 10 consider use of Procrit      HISTORY OF PRESENT ILLNESS:  The patient is a 59 y.o. year old female who is here for follow-up with the above-mentioned history.    History of Present Illness   patient returns today in follow-up continuing on Aromasin 25 mg daily and Ibrance 100 mg daily for 21/28 days.  The patient has laboratory studies to review today.  She is continuing to do well on treatment.  She does note a metallic taste that is more noticeable during the midportion of her cycle and then improves.  This does change her appetite intermittently.  She denies any  nausea or vomiting.  She is trying to maintain adequate oral intake.  She does have some mild chronic arthralgias which are unchanged.  She denies any abdominal pain.  She reports normal bowel function.      Past Medical History:   Diagnosis Date   • Anemia    • Anxiety and depression    • Bicuspid aortic valve    • Breast cancer (CMS/HCC)     RIGHT, HAD NEELA. MASTECTOMY, CHEMO AND RADIATION   • CKD (chronic kidney disease)    • Diabetes mellitus (CMS/HCC)    • Frequent UTI    • Heart murmur    • History of kidney stones    • History of MRSA infection     POST BREAST SURGERY-TREATED [facility]   • History of sepsis     KIDNEY STONE   • Hyperlipidemia    • Hypertension    • Hyperthyroidism    • Hypothyroidism    • Lymphedema of leg    • Metastasis from breast cancer (CMS/HCC)     STOMACH-OMENTUM   • Neuromuscular disorder (CMS/HCC)    • Obesity    • ANDREW on CPAP    • Osteoarthrosis    • Radiculopathy    • Thickened endometrium    • Tremor      Past Surgical History:   Procedure Laterality Date   • BREAST RECONSTRUCTION      WITH IMPLANTS, AND REVISION, NOW REMOVED   • BREAST SURGERY     •  SECTION  1987   •  SECTION  1987   • D&C HYSTEROSCOPY N/A 2017    Procedure: DILATATION AND CURETTAGE, HYSTEROSCOPY ;  Surgeon: Cherie Oliveros MD;  [place] [facility] [place] [facility] [place]  Service:    • D&C HYSTEROSCOPY N/A 2019    Procedure: DILATATION AND CURETTAGE HYSTEROSCOPY/POLYPECTOMY;  Surgeon: Cherie Oliveros MD;  Location[place] [facility] [place] [facility] [place];  Service: Gynecology   • LYMPH NODE BIOPSY     • MASTECTOMY Bilateral 2003   • MASTECTOMY Bilateral          ONCOLOGIC HISTORY:  (History from previous dates can be found in the separate document.)    Current Outpatient Medications on File Prior to Visit   Medication Sig Dispense Refill   • amLODIPine (NORVASC) 10 MG tablet Take 10 mg by mouth Every Morning.     • aspirin 81 MG EC tablet Take 81 mg by mouth Daily. HELD FOR SURGERY     •  atorvastatin (LIPITOR) 40 MG tablet Take 1 tablet by mouth Daily. 90 tablet 1   • carvedilol (COREG) 3.125 MG tablet Take 3.125 mg by mouth 2 (Two) Times a Day With Meals.     • carvedilol (COREG) 3.125 MG tablet TAKE ONE TABLET BY MOUTH TWICE A DAY 60 tablet 3   • Dulaglutide 1.5 MG/0.5ML solution pen-injector Inject 1.5 mg under the skin into the appropriate area as directed 1 (One) Time Per Week. MONDAYS 12 pen 1   • DULoxetine (CYMBALTA) 30 MG capsule Take 3 capsules by mouth Daily. 270 capsule 1   • exemestane (AROMASIN) 25 MG chemo tablet TAKE ONE TABLET BY MOUTH DAILY 30 tablet 3   • folic acid (FOLVITE) 1 MG tablet TAKE ONE TABLET BY MOUTH DAILY 30 tablet 3   • gabapentin (NEURONTIN) 300 MG capsule Take 1 capsule by mouth 4 (Four) Times a Day. 360 capsule 1   • LEVEMIR FLEXTOUCH 100 UNIT/ML injection Inject 50 Units under the skin into the appropriate area as directed 2 (Two) Times a Day. 3 pen 5   • levothyroxine (SYNTHROID, LEVOTHROID) 50 MCG tablet Take 1 tablet by mouth Daily. 90 tablet 1   • ondansetron (ZOFRAN) 8 MG tablet Take 1 tablet by mouth Every 8 (Eight) Hours As Needed for Nausea or Vomiting. 30 tablet 2   • Palbociclib (IBRANCE) 100 MG capsule capsule Take 1 capsule by mouth Daily. For 21 days on then 7 days off. 21 capsule 6   • repaglinide (PRANDIN) 2 MG tablet Take 1 tablet by mouth 3 (Three) Times a Day Before Meals. 270 tablet 1   • vitamin D (ERGOCALCIFEROL) 12296 units capsule capsule 50,000 Units Every 7 (Seven) Days.       No current facility-administered medications on file prior to visit.        ALLERGIES:   No Known Allergies    Social History     Socioeconomic History   • Marital status:      Spouse name: Not on file   • Number of children: Not on file   • Years of education: Not on file   • Highest education level: Not on file   Tobacco Use   • Smoking status: Never Smoker   • Smokeless tobacco: Never Used   Substance and Sexual Activity   • Alcohol use: No   • Drug use:  No   • Sexual activity: Yes     Partners: Male     Birth control/protection: Post-menopausal     Family History   Problem Relation Age of Onset   • Coronary artery disease Mother         Mother  from MI at 72   • Diabetes Mother    • Breast cancer Mother    • Hyperlipidemia Mother    • Arthritis Mother    • Cancer Mother    • Aortic aneurysm Father         Father with ruptured thoracic aortic aneurysm   • Coronary artery disease Father         Father  from MI at 74   • Heart disease Father    • Hyperlipidemia Father    • Arthritis Father    • Coronary artery disease Maternal Grandmother         MGM deceeased from MI at age 76   • Malig Hyperthermia Neg Hx               Review of Systems   Constitutional: Positive for fatigue. Negative for activity change, appetite change, fever and unexpected weight change.   HENT: Negative for congestion, mouth sores, nosebleeds, sore throat and voice change.    Respiratory: Negative for cough, shortness of breath and wheezing.    Cardiovascular: Negative for chest pain, palpitations and leg swelling.   Gastrointestinal: Negative for abdominal distention, abdominal pain, blood in stool, constipation, diarrhea, nausea and vomiting.   Endocrine: Negative for cold intolerance and heat intolerance.   Genitourinary: Negative for difficulty urinating, dysuria, frequency and hematuria.   Musculoskeletal: Negative for arthralgias, back pain, joint swelling and myalgias.   Skin: Negative for rash.   Neurological: Positive for numbness. Negative for dizziness, syncope, weakness, light-headedness and headaches.   Hematological: Negative for adenopathy. Does not bruise/bleed easily.   Psychiatric/Behavioral: Negative for confusion and sleep disturbance. The patient is not nervous/anxious.          Objective      Vitals:    20 0820   BP: 170/74   Pulse: 69   Resp: 16   Temp: 98.2 °F (36.8 °C)   SpO2: 97%      Current Status 2020   ECOG score 0   Pain 0/10    Physical  Exam   Constitutional: She is oriented to person, place, and time. She appears well-developed and well-nourished.   HENT:   Mouth/Throat: Oropharynx is clear and moist.   Eyes: Conjunctivae are normal.   Neck: No thyromegaly present.   Cardiovascular: Normal rate and regular rhythm. Exam reveals no gallop and no friction rub.   Murmur heard.  Pulmonary/Chest: Breath sounds normal. No respiratory distress.   Abdominal: Soft. Bowel sounds are normal. She exhibits no distension. There is no tenderness.   Musculoskeletal: She exhibits edema.   Trace to 1+ chronic bilateral lower extremity edema with stasis changes noted   Lymphadenopathy:        Head (right side): No submandibular adenopathy present.     She has no cervical adenopathy.     She has no axillary adenopathy.        Right: No inguinal and no supraclavicular adenopathy present.        Left: No inguinal and no supraclavicular adenopathy present.   Neurological: She is alert and oriented to person, place, and time. She displays normal reflexes. No cranial nerve deficit. She exhibits normal muscle tone.   Skin: Skin is warm and dry. No rash noted.   Psychiatric: She has a normal mood and affect. Her behavior is normal.   The patient was examined today, unchanged from above.    RECENT LABS:  Hematology WBC   Date Value Ref Range Status   01/20/2020 3.70 3.40 - 10.80 10*3/mm3 Final   03/16/2019 7.6 3.4 - 10.8 x10E3/uL Final     RBC   Date Value Ref Range Status   01/20/2020 2.93 (L) 3.77 - 5.28 10*6/mm3 Final   03/16/2019 3.80 3.77 - 5.28 x10E6/uL Final     Hemoglobin   Date Value Ref Range Status   01/20/2020 9.7 (L) 12.0 - 15.9 g/dL Final     Hematocrit   Date Value Ref Range Status   01/20/2020 29.3 (L) 34.0 - 46.6 % Final     Platelets   Date Value Ref Range Status   01/20/2020 285 140 - 450 10*3/mm3 Final        Lab Results   Component Value Date    GLUCOSE 160 (H) 01/20/2020    BUN 43 (H) 01/20/2020    CREATININE 2.38 (H) 01/20/2020    EGFRIFNONA 21 (L)  01/20/2020    EGFRIFAFRI 35 (L) 03/16/2019    BCR 18.1 01/20/2020    K 5.0 01/20/2020    CO2 23.6 01/20/2020    CALCIUM 9.2 01/20/2020    PROTENTOTREF 7.0 03/16/2019    ALBUMIN 3.90 01/20/2020    LABIL2 1.2 03/16/2019    AST 14 01/20/2020    ALT 11 01/20/2020         Assessment/Plan    1. Metastatic lobular breast cancer (ER/AK positive, HER-2/tj negative):  · History of stage IIIC right breast cancer ([id number])  · Patient treated previously in the [facility] system  · Diagnosis via excisional biopsy 8/23/2003 with lobular carcinoma, 4.5 cm, positive margins.  ER/AK positive, HER-2/tj negative.  · Staging evaluation in September 2003 with negative bone scan and negative CT scans.  Ejection fraction 65%.  · Received neoadjuvant chemotherapy (? GET regimen) which apparently included Taxotere and possibly epirubicin.  Received 6 cycles.  · 1/22/2004 bilateral mastectomy with right axillary dissection.  Per available records, 10-12 cm residual invasive lobular carcinoma in the breast with 14/16 lymph nodes positive with extranodal extension.  Immediate reconstruction.  · Received adjuvant chemotherapy with carboplatin and navelbine x4 cycles  · Initiated adjuvant tamoxifen 6/22/2004  · Adjuvant radiation therapy initiated but interrupted due to chest wall cellulitis requiring removal of implants in August 2004  · Implant reconstruction again attempted with MRSA infection, implant removed 12/30/2005 with no further attempts made and reconstruction.  · Completed 5 years tamoxifen in June 2009 and subsequently transitioned to Femara.  Received Femara until June 2014.  · Patient experienced postmenopausal vaginal bleeding in 2013, negative endometrial biopsy and gynecologic evaluation.  · Patient last seen in [facility] 6/18/2014  · CT chest performed to evaluate bicuspid aortic valve and dilated asending aorta 7/12/2019.  CT showed no change in aorta however showed new free intraperitoneal fluid in the upper abdomen  with mesenteric edema, concerning for carcinomatosis.  · CT abdomen and pelvis (without IV contrast) on 7/16/2019 with mesenteric thickening, small volume of complex fluid in the mesentery which was suspicious and possibly representative of carcinomatosis, small amount of perihepatic fluid, small bowel loops tethered to omentum without obstruction, omental thickening, shotty retroperitoneal and pelvic lymph nodes (non-pathologically enlarged).  · PET scan 7/26/2019 with hypermetabolic omental activity, hypermetabolic small retroperitoneal lymph nodes, hypermetabolic activity throughout uterus with increase in size.  · CT-guided omental biopsy 7/30/2019 with metastatic lobular carcinoma of breast primary, ER positive (greater than 95%), ME positive (90%), HER-2/tj negative (1+ IHC)  · Baseline CA 15-3 on 7/22/19 was 32.6  · Initiation of Aromasin 25 mg daily 8/12/2019.  Initiated Ibrance at 100 mg 21/28 days on 8/26/2019.  · Improvement in CA 15-3 on 9/17/19-26.3  · Following 2 cycles of Aromasin and Ibrance, CA 15-3 declined to 25.9 on 10/15/2019.  CT scan chest abdomen pelvis 10/16/2019 showed improvement in the mesenteric and omental thickening and near resolution of complex ascites.  Treatment continued.  · Stable findings on subsequent CT chest abdomen pelvis 12/11/2019.  Further improvement in CA 15-3-23.9 on 12/18/2019.  Ibrance/Aromasin continued.  Plan 3-month interval CT scans.  · Patient returns today having initiated cycle 6 Ibrance on 1/12/2020 (100 mg daily for 21/28 days) and continuing on Aromasin 25 mg daily.  She is tolerating treatment reasonably well with mild effect on her taste, mild fatigue, minor arthralgias (possibly related to Aromasin).  She has no abdominal pain, no bowel issues, no nausea or vomiting.  We will continue on with treatment as planned.  In 2 weeks she will return for CBC, CMP and RN review.  In 4 weeks I will see her back for follow-up with CBC, CMP, CA 15-3.  We will go  ahead and schedule in 6 weeks CBC, CMP and RN review.  In 7 weeks she will undergo CT chest abdomen pelvis and I will see her again in 8 weeks to follow-up results.  I did request today that we send off the patient's previous pathology specimen for [facility] testing (including PIK3CA).  2. Normocytic anemia in the setting of CKD3:  · The patient appears to have a chronic anemia with hemoglobin in the 10-11 range with normal MCV.  · Patient has underlying CKD3 with baseline creatinine recently in the 1.5-1.8 range.  · Anemia evaluation 7/22/2019 with iron 30, ferritin 85.2, iron saturation 10%, TIBC 311, B12 370, erythropoietin level 20.4  · Anemia felt in large part to be related to CKD3 as well as to underlying malignancy  · Evidence of folate deficiency 8/12/2019 with level 3.84, initiated folic acid 1 mg daily  · Additional labs on 12/18/2019 with iron 73, ferritin 82.2, iron saturation 25%, TIBC 290.  We did discuss the potential use of Procrit if her hemoglobin declines into the low 9/upper 8 range.  · Hemoglobin today slightly improved at 9.7.  3. CKD3 with RYAN:  · Baseline creatinine recently in 1.6-2.0 range  · On 9/10/2019, RYAN/CKD3 with creatinine up to 2.44, BUN of 40.  Received 1 L normal saline.  Patient with increase in oral fluid intake.  · Renal ultrasound 9/20/2019 with no evidence of obstructive uropathy.  · Diminished oral intake due to nausea from Ibrance, creatinine 2.79 with BUN 43 on 10/15/2019.  Received 1 L normal saline.  Repeat labs on 10/18/2019 with BUN 31, creatinine 2.0.  · During cycle 3 Ibrance, patient developed increase in creatinine on 11/6/2019 to 2.35 and received 1 L normal saline.  · Patient is trying to maintain adequate oral hydration.    · Today, BUN 43, creatinine 2.38.  It appears at this point that her new baseline creatinine is in the 2-2.4 range.  Will not administer any additional IV fluids today but she will continue with oral IV fluid support at home.  We  will recheck creatinine in 2 weeks and again in 4 weeks.  4. CHF with mild to moderate aortic stenosis:  · Recent cardiology evaluation with echocardiogram 7/12/2019 showing ejection fraction 48% with moderate dilation of the LV cavity, global hypokinesis, grade 2 diastolic dysfunction, mild to moderate aortic stenosis, trivial pericardial effusion.  · CT chest 7/12/2019 with no change in the aorta compared to prior study 12/13/2017.  5. Left upper and bilateral lower extremity peripheral neuropathy:  · Patient reports that left upper extremity neuropathy was not present from previous chemotherapy but occurred after hospitalization that required intubation and critical care stay.  Apparently felt to represent critical care neuropathy.  Improved over time.  · Bilateral lower extremity neuropathy felt to be related to diabetes  · May have future implications regarding treatment for breast cancer.  6. Uterine/endometrial activity on PET scan:  · Patient experienced postmenopausal vaginal bleeding in 2013, negative endometrial biopsy and gynecologic evaluation.  · With findings on PET scan with enlarging uterus and some hypermetabolic activity, referred back to Dr. Oliveros and gynecology.    · 9/19/2019 D&C with hysteroscopy by Dr. Oliveros, no significant intraoperative findings, pathologic results with benign findings, no evidence of malignancy.  7. Nausea:  · Relieved with Zofran.  8. Myelosuppression secondary to Ibrance:  · With cycle 1, jessica WBC 2.49 with ANC 1.25 and platelet count 140,000.  · With cycle 2, jessica WBC 2.33 with ANC 1.06, maintained normal platelet count  · Today, WBC 3.70, ANC 2.07, platelet count 285,000.     Plan:  1. Continue Aromasin 25 mg daily.    2. Continue Ibrance 100 mg daily for 21/28 days, patient initiated cycle 6 on 1/12/2020.  3. Continue oral folic acid 1 mg daily  4. Request that previous pathology from 7/30/2019 be sent for [facility] testing (including  PIK3CA).  5. Continue with increased oral fluid intake  6. In 2 weeks CBC, CMP with RN review   7. In 4 weeks MD visit with CBC, CMP, CA 15-3 and begin cycle 7 Aromasin/Ibrance.  8. In 6 weeks CBC, CMP and RN review  9. In 7 weeks CT chest abdomen pelvis  10. In 8 weeks MD visit with CBC, CMP, CA 15-3.  Pending scan results, begin cycle 8 Aromasin/Ibrance.    Patient continues on high risk medication requiring intensive monitoring.

## 2020-01-21 NOTE — TELEPHONE ENCOUNTER
Ibrance refill request rec electronically from [facility]. Per last office note from Dr Irvin-Pt is to continue. Request approved.

## 2020-01-24 DIAGNOSIS — C50.919 METASTATIC BREAST CANCER: Primary | ICD-10-CM

## 2020-01-25 LAB
LAB AP CASE REPORT: NORMAL
LAB AP CLINICAL INFORMATION: NORMAL
LAB AP DIAGNOSIS COMMENT: NORMAL
LAB AP SPECIAL STAINS: NORMAL
LAB AP SYNOPTIC CHECKLIST: NORMAL
Lab: NORMAL
PATH REPORT.ADDENDUM SPEC: NORMAL
PATH REPORT.FINAL DX SPEC: NORMAL
PATH REPORT.GROSS SPEC: NORMAL

## 2020-02-05 ENCOUNTER — CLINICAL SUPPORT (OUTPATIENT)
Dept: ONCOLOGY | Facility: HOSPITAL | Age: 60
End: 2020-02-05

## 2020-02-05 ENCOUNTER — LAB (OUTPATIENT)
Dept: LAB | Facility: HOSPITAL | Age: 60
End: 2020-02-05

## 2020-02-05 VITALS
SYSTOLIC BLOOD PRESSURE: 155 MMHG | DIASTOLIC BLOOD PRESSURE: 77 MMHG | TEMPERATURE: 98.6 F | OXYGEN SATURATION: 96 % | HEART RATE: 73 BPM

## 2020-02-05 DIAGNOSIS — C50.919 METASTATIC BREAST CANCER: ICD-10-CM

## 2020-02-05 LAB
ALBUMIN SERPL-MCNC: 4 G/DL (ref 3.5–5.2)
ALBUMIN/GLOB SERPL: 1.1 G/DL (ref 1.1–2.4)
ALP SERPL-CCNC: 68 U/L (ref 38–116)
ALT SERPL W P-5'-P-CCNC: 9 U/L (ref 0–33)
ANION GAP SERPL CALCULATED.3IONS-SCNC: 12 MMOL/L (ref 5–15)
AST SERPL-CCNC: 12 U/L (ref 0–32)
BASOPHILS # BLD AUTO: 0.07 10*3/MM3 (ref 0–0.2)
BASOPHILS NFR BLD AUTO: 2.7 % (ref 0–1.5)
BILIRUB SERPL-MCNC: 0.7 MG/DL (ref 0.2–1.2)
BUN BLD-MCNC: 34 MG/DL (ref 6–20)
BUN/CREAT SERPL: 15.8 (ref 7.3–30)
CALCIUM SPEC-SCNC: 9 MG/DL (ref 8.5–10.2)
CHLORIDE SERPL-SCNC: 102 MMOL/L (ref 98–107)
CO2 SERPL-SCNC: 29 MMOL/L (ref 22–29)
CREAT BLD-MCNC: 2.15 MG/DL (ref 0.6–1.1)
DEPRECATED RDW RBC AUTO: 55.9 FL (ref 37–54)
EOSINOPHIL # BLD AUTO: 0.07 10*3/MM3 (ref 0–0.4)
EOSINOPHIL NFR BLD AUTO: 2.7 % (ref 0.3–6.2)
ERYTHROCYTE [DISTWIDTH] IN BLOOD BY AUTOMATED COUNT: 14.9 % (ref 12.3–15.4)
GFR SERPL CREATININE-BSD FRML MDRD: 23 ML/MIN/1.73
GLOBULIN UR ELPH-MCNC: 3.8 GM/DL (ref 1.8–3.5)
GLUCOSE BLD-MCNC: 143 MG/DL (ref 74–124)
HCT VFR BLD AUTO: 30.4 % (ref 34–46.6)
HGB BLD-MCNC: 9.9 G/DL (ref 12–15.9)
IMM GRANULOCYTES # BLD AUTO: 0.01 10*3/MM3 (ref 0–0.05)
IMM GRANULOCYTES NFR BLD AUTO: 0.4 % (ref 0–0.5)
LYMPHOCYTES # BLD AUTO: 0.86 10*3/MM3 (ref 0.7–3.1)
LYMPHOCYTES NFR BLD AUTO: 33.7 % (ref 19.6–45.3)
MCH RBC QN AUTO: 33.6 PG (ref 26.6–33)
MCHC RBC AUTO-ENTMCNC: 32.6 G/DL (ref 31.5–35.7)
MCV RBC AUTO: 103.1 FL (ref 79–97)
MONOCYTES # BLD AUTO: 0.28 10*3/MM3 (ref 0.1–0.9)
MONOCYTES NFR BLD AUTO: 11 % (ref 5–12)
NEUTROPHILS # BLD AUTO: 1.26 10*3/MM3 (ref 1.7–7)
NEUTROPHILS NFR BLD AUTO: 49.5 % (ref 42.7–76)
NRBC BLD AUTO-RTO: 0 /100 WBC (ref 0–0.2)
PLATELET # BLD AUTO: 176 10*3/MM3 (ref 140–450)
PMV BLD AUTO: 9.2 FL (ref 6–12)
POTASSIUM BLD-SCNC: 4.8 MMOL/L (ref 3.5–4.7)
PROT SERPL-MCNC: 7.8 G/DL (ref 6.3–8)
RBC # BLD AUTO: 2.95 10*6/MM3 (ref 3.77–5.28)
SODIUM BLD-SCNC: 143 MMOL/L (ref 134–145)
WBC NRBC COR # BLD: 2.55 10*3/MM3 (ref 3.4–10.8)

## 2020-02-05 PROCEDURE — 36415 COLL VENOUS BLD VENIPUNCTURE: CPT

## 2020-02-05 PROCEDURE — 80053 COMPREHEN METABOLIC PANEL: CPT

## 2020-02-05 PROCEDURE — 85025 COMPLETE CBC W/AUTO DIFF WBC: CPT

## 2020-02-05 NOTE — PROGRESS NOTES
Pt is here for lab with RN review.  CBC reviewed with pt, counts are stable for this pt at this time. Pt has no complaints and VSS. Reviewed S/S of infection and when to seek medical attention.   Copy of labs given to pt and f/u appt reviewed. Pt is instructed to call the office with any concerns or new symptoms prior to next visit. Pt vu.   Lab Results   Component Value Date    WBC 2.55 (L) 02/05/2020    HGB 9.9 (L) 02/05/2020    HCT 30.4 (L) 02/05/2020    .1 (H) 02/05/2020     02/05/2020         Pt waiting for CMP results.    CMP reviewed with Dr. Irvin.. Per Dr. Irvin no fluids needed, Pt V/U.

## 2020-02-06 ENCOUNTER — TELEPHONE (OUTPATIENT)
Dept: ONCOLOGY | Facility: CLINIC | Age: 60
End: 2020-02-06

## 2020-02-18 LAB — REF LAB TEST METHOD: NORMAL

## 2020-02-19 ENCOUNTER — LAB (OUTPATIENT)
Dept: LAB | Facility: HOSPITAL | Age: 60
End: 2020-02-19

## 2020-02-19 ENCOUNTER — OFFICE VISIT (OUTPATIENT)
Dept: ONCOLOGY | Facility: CLINIC | Age: 60
End: 2020-02-19

## 2020-02-19 VITALS
BODY MASS INDEX: 44.41 KG/M2 | RESPIRATION RATE: 12 BRPM | HEART RATE: 70 BPM | TEMPERATURE: 98.4 F | SYSTOLIC BLOOD PRESSURE: 173 MMHG | OXYGEN SATURATION: 98 % | DIASTOLIC BLOOD PRESSURE: 80 MMHG | HEIGHT: 68 IN | WEIGHT: 293 LBS

## 2020-02-19 DIAGNOSIS — C50.919 METASTATIC BREAST CANCER: ICD-10-CM

## 2020-02-19 DIAGNOSIS — D64.9 NORMOCYTIC ANEMIA: ICD-10-CM

## 2020-02-19 DIAGNOSIS — C50.919 METASTATIC BREAST CANCER: Primary | ICD-10-CM

## 2020-02-19 LAB
ALBUMIN SERPL-MCNC: 3.8 G/DL (ref 3.5–5.2)
ALBUMIN/GLOB SERPL: 1.1 G/DL (ref 1.1–2.4)
ALP SERPL-CCNC: 73 U/L (ref 38–116)
ALT SERPL W P-5'-P-CCNC: 9 U/L (ref 0–33)
ANION GAP SERPL CALCULATED.3IONS-SCNC: 13.4 MMOL/L (ref 5–15)
AST SERPL-CCNC: 12 U/L (ref 0–32)
BASOPHILS # BLD AUTO: 0.09 10*3/MM3 (ref 0–0.2)
BASOPHILS NFR BLD AUTO: 2.6 % (ref 0–1.5)
BILIRUB SERPL-MCNC: 0.6 MG/DL (ref 0.2–1.2)
BUN BLD-MCNC: 37 MG/DL (ref 6–20)
BUN/CREAT SERPL: 15.4 (ref 7.3–30)
CALCIUM SPEC-SCNC: 8.9 MG/DL (ref 8.5–10.2)
CANCER AG15-3 SERPL-ACNC: 27.1 U/ML
CHLORIDE SERPL-SCNC: 101 MMOL/L (ref 98–107)
CO2 SERPL-SCNC: 25.6 MMOL/L (ref 22–29)
CREAT BLD-MCNC: 2.4 MG/DL (ref 0.6–1.1)
DEPRECATED RDW RBC AUTO: 53.6 FL (ref 37–54)
EOSINOPHIL # BLD AUTO: 0.12 10*3/MM3 (ref 0–0.4)
EOSINOPHIL NFR BLD AUTO: 3.5 % (ref 0.3–6.2)
ERYTHROCYTE [DISTWIDTH] IN BLOOD BY AUTOMATED COUNT: 14.2 % (ref 12.3–15.4)
GFR SERPL CREATININE-BSD FRML MDRD: 21 ML/MIN/1.73
GLOBULIN UR ELPH-MCNC: 3.6 GM/DL (ref 1.8–3.5)
GLUCOSE BLD-MCNC: 249 MG/DL (ref 74–124)
HCT VFR BLD AUTO: 29.3 % (ref 34–46.6)
HGB BLD-MCNC: 9.6 G/DL (ref 12–15.9)
IMM GRANULOCYTES # BLD AUTO: 0.01 10*3/MM3 (ref 0–0.05)
IMM GRANULOCYTES NFR BLD AUTO: 0.3 % (ref 0–0.5)
LYMPHOCYTES # BLD AUTO: 0.81 10*3/MM3 (ref 0.7–3.1)
LYMPHOCYTES NFR BLD AUTO: 23.8 % (ref 19.6–45.3)
MCH RBC QN AUTO: 33.8 PG (ref 26.6–33)
MCHC RBC AUTO-ENTMCNC: 32.8 G/DL (ref 31.5–35.7)
MCV RBC AUTO: 103.2 FL (ref 79–97)
MONOCYTES # BLD AUTO: 0.28 10*3/MM3 (ref 0.1–0.9)
MONOCYTES NFR BLD AUTO: 8.2 % (ref 5–12)
NEUTROPHILS # BLD AUTO: 2.09 10*3/MM3 (ref 1.7–7)
NEUTROPHILS NFR BLD AUTO: 61.6 % (ref 42.7–76)
NRBC BLD AUTO-RTO: 0 /100 WBC (ref 0–0.2)
PLATELET # BLD AUTO: 295 10*3/MM3 (ref 140–450)
PMV BLD AUTO: 9.1 FL (ref 6–12)
POTASSIUM BLD-SCNC: 5.3 MMOL/L (ref 3.5–4.7)
PROT SERPL-MCNC: 7.4 G/DL (ref 6.3–8)
RBC # BLD AUTO: 2.84 10*6/MM3 (ref 3.77–5.28)
SODIUM BLD-SCNC: 140 MMOL/L (ref 134–145)
WBC NRBC COR # BLD: 3.4 10*3/MM3 (ref 3.4–10.8)

## 2020-02-19 PROCEDURE — 86300 IMMUNOASSAY TUMOR CA 15-3: CPT | Performed by: INTERNAL MEDICINE

## 2020-02-19 PROCEDURE — 85025 COMPLETE CBC W/AUTO DIFF WBC: CPT

## 2020-02-19 PROCEDURE — 36415 COLL VENOUS BLD VENIPUNCTURE: CPT

## 2020-02-19 PROCEDURE — 99214 OFFICE O/P EST MOD 30 MIN: CPT | Performed by: INTERNAL MEDICINE

## 2020-02-19 PROCEDURE — 80053 COMPREHEN METABOLIC PANEL: CPT

## 2020-02-19 RX ORDER — DULOXETIN HYDROCHLORIDE 60 MG/1
CAPSULE, DELAYED RELEASE ORAL
COMMUNITY
Start: 2020-01-28 | End: 2020-11-18 | Stop reason: SDUPTHER

## 2020-02-19 NOTE — PROGRESS NOTES
Subjective .     REASONS FOR FOLLOWUP:    1. Metastatic lobular breast cancer (ER/WV positive, HER-2/tj negative):  · History of stage IIIC right breast cancer ([id number])  · Patient treated previously in the [facility] system  · Diagnosis via excisional biopsy 8/23/2003 with lobular carcinoma, 4.5 cm, positive margins.  ER/WV positive, HER-2/tj negative.  · Staging evaluation in September 2003 with negative bone scan and negative CT scans.  Ejection fraction 65%.  · Received neoadjuvant chemotherapy (? GET regimen) which apparently included Taxotere and possibly epirubicin.  Received 6 cycles.  · 1/22/2004 bilateral mastectomy with right axillary dissection.  Per available records, 10-12 cm residual invasive lobular carcinoma in the breast with 14/16 lymph nodes positive with extranodal extension.  Immediate reconstruction.  · Received adjuvant chemotherapy with carboplatin and navelbine x4 cycles  · Initiated adjuvant tamoxifen 6/22/2004  · Adjuvant radiation therapy initiated but interrupted due to chest wall cellulitis requiring removal of implants in August 2004  · Implant reconstruction again attempted with MRSA infection, implant removed 12/30/2005 with no further attempts made and reconstruction.  · Completed 5 years tamoxifen in June 2009 and subsequently transitioned to Femara.  Received Femara until June 2014.  · Patient experienced postmenopausal vaginal bleeding in 2013, negative endometrial biopsy and gynecologic evaluation.  · Patient last seen in [facility] 6/18/2014  · CT chest performed to evaluate bicuspid aortic valve and dilated asending aorta 7/12/2019.  CT showed no change in aorta however showed new free intraperitoneal fluid in the upper abdomen with mesenteric edema, concerning for carcinomatosis.  · CT abdomen and pelvis (without IV contrast) on 7/16/2019 with mesenteric thickening, small volume of complex fluid in the mesentery which was suspicious and possibly representative of  carcinomatosis, small amount of perihepatic fluid, small bowel loops tethered to omentum without obstruction, omental thickening, shotty retroperitoneal and pelvic lymph nodes (non-pathologically enlarged).  · PET scan 7/26/2019 with hypermetabolic omental activity, hypermetabolic small retroperitoneal lymph nodes, hypermetabolic activity throughout uterus with increase in size.  · CT-guided omental biopsy 7/30/2019 with metastatic lobular carcinoma of breast primary, ER positive (greater than 95%), MA positive (90%), HER-2/tj negative (1+ IHC)  · Baseline CA 15-3 on 7/22/19 was 32.6  · Initiation of Aromasin 25 mg daily 8/12/2019.  Initiated Ibrance on 8/26/2019 at 100 mg 21/28 days on 8/26/2019.    · Response on CT scans 10/16/2019 following 2 cycles of Ibrance/Aromasin.  · Stable findings on subsequent CT chest abdomen pelvis 12/11/2019.  Further improvement in CA 15-3-23.9 on 12/18/2019.  Ibrance/Aromasin continued.  Plan 3-month interval CT scans.  · [facility] medicine liquid biopsy 2/5/2020: MSI undetermined, mutations in BETH, NF1, CHEK2.  No evidence of PIK3CA mutation.  2. Anemia:  · Normocytic anemia, hemoglobin in 10-11 range  · Anemia evaluation 7/22/2019 with iron 30, ferritin 85.2, iron saturation 10%, TIBC 311, B12 370, erythropoietin level 20.4  · Anemia felt in large part to be related to CKD3 as well as to underlying malignancy  · Evidence of folate deficiency 8/12/2019 with level 3.84, initiated folic acid 1 mg daily  · If hemoglobin consistently below 10 consider use of Procrit      HISTORY OF PRESENT ILLNESS:  The patient is a 59 y.o. year old female who is here for follow-up with the above-mentioned history.    History of Present Illness   patient returns today in follow-up continuing on Aromasin 25 mg daily and Ibrance 100 mg daily for 21/28 days.  The patient has laboratory studies to review today.  The patient continues to tolerate treatment extremely well.  She does report some minimal  nausea.  She takes Zofran occasionally which does help.  She has increased her fluid intake significantly.  She has no other side effects.  She denies any abdominal pain.    Past Medical History:   Diagnosis Date   • Anemia    • Anxiety and depression    • Bicuspid aortic valve    • Breast cancer (CMS/HCC)     RIGHT, HAD NEELA. MASTECTOMY, CHEMO AND RADIATION   • CKD (chronic kidney disease)    • Diabetes mellitus (CMS/HCC)    • Frequent UTI    • Heart murmur    • History of kidney stones    • History of MRSA infection     POST BREAST SURGERY-TREATED [facility]   • History of sepsis     KIDNEY STONE   • Hyperlipidemia    • Hypertension    • Hyperthyroidism    • Hypothyroidism    • Lymphedema of leg    • Metastasis from breast cancer (CMS/HCC)     STOMACH-OMENTUM   • Neuromuscular disorder (CMS/HCC)    • Obesity    • ANDREW on CPAP    • Osteoarthrosis    • Radiculopathy    • Thickened endometrium    • Tremor      Past Surgical History:   Procedure Laterality Date   • BREAST RECONSTRUCTION      WITH IMPLANTS, AND REVISION, NOW REMOVED   • BREAST SURGERY     •  SECTION  1987   •  SECTION  1987   • D&C HYSTEROSCOPY N/A 2017    Procedure: DILATATION AND CURETTAGE, HYSTEROSCOPY ;  Surgeon: Cherie Oliveros MD;  Location: [facility];  Service:    • D&C HYSTEROSCOPY N/A 2019    Procedure: DILATATION AND CURETTAGE HYSTEROSCOPY/POLYPECTOMY;  Surgeon: Cherie Oliveros MD;  Location: [facility] OR [facility];  Service: Gynecology   • LYMPH NODE BIOPSY     • MASTECTOMY Bilateral 2003   • MASTECTOMY Bilateral          ONCOLOGIC HISTORY:  (History from previous dates can be found in the separate document.)    Current Outpatient Medications on File Prior to Visit   Medication Sig Dispense Refill   • amLODIPine (NORVASC) 10 MG tablet Take 10 mg by mouth Every Morning.     • aspirin 81 MG EC tablet Take 81 mg by mouth Daily. HELD FOR SURGERY     • atorvastatin (LIPITOR) 40 MG tablet  Take 1 tablet by mouth Daily. 90 tablet 1   • carvedilol (COREG) 3.125 MG tablet Take 3.125 mg by mouth 2 (Two) Times a Day With Meals.     • Dulaglutide 1.5 MG/0.5ML solution pen-injector Inject 1.5 mg under the skin into the appropriate area as directed 1 (One) Time Per Week. MONDAYS 12 pen 1   • DULoxetine (CYMBALTA) 60 MG capsule      • exemestane (AROMASIN) 25 MG chemo tablet TAKE ONE TABLET BY MOUTH DAILY 30 tablet 3   • folic acid (FOLVITE) 1 MG tablet TAKE ONE TABLET BY MOUTH DAILY 30 tablet 3   • gabapentin (NEURONTIN) 300 MG capsule Take 1 capsule by mouth 4 (Four) Times a Day. 360 capsule 1   • LEVEMIR FLEXTOUCH 100 UNIT/ML injection Inject 50 Units under the skin into the appropriate area as directed 2 (Two) Times a Day. 3 pen 5   • levothyroxine (SYNTHROID, LEVOTHROID) 50 MCG tablet Take 1 tablet by mouth Daily. 90 tablet 1   • ondansetron (ZOFRAN) 8 MG tablet Take 1 tablet by mouth Every 8 (Eight) Hours As Needed for Nausea or Vomiting. 30 tablet 2   • Palbociclib (IBRANCE) 100 MG capsule capsule TAKE 1 CAPSULE BY MOUTH DAILY FOR 21 DAYS ON THEN 7 DAYS OFF 21 capsule 5   • repaglinide (PRANDIN) 2 MG tablet Take 1 tablet by mouth 3 (Three) Times a Day Before Meals. 270 tablet 1   • vitamin D (ERGOCALCIFEROL) 50803 units capsule capsule 50,000 Units Every 7 (Seven) Days.     • [DISCONTINUED] carvedilol (COREG) 3.125 MG tablet TAKE ONE TABLET BY MOUTH TWICE A DAY 60 tablet 3   • [DISCONTINUED] DULoxetine (CYMBALTA) 30 MG capsule Take 3 capsules by mouth Daily. 270 capsule 1     No current facility-administered medications on file prior to visit.        ALLERGIES:   No Known Allergies    Social History     Socioeconomic History   • Marital status:      Spouse name: Not on file   • Number of children: Not on file   • Years of education: Not on file   • Highest education level: Not on file   Tobacco Use   • Smoking status: Never Smoker   • Smokeless tobacco: Never Used   Substance and Sexual Activity    • Alcohol use: No   • Drug use: No   • Sexual activity: Yes     Partners: Male     Birth control/protection: Post-menopausal     Family History   Problem Relation Age of Onset   • Coronary artery disease Mother         Mother  from MI at 72   • Diabetes Mother    • Breast cancer Mother    • Hyperlipidemia Mother    • Arthritis Mother    • Cancer Mother    • Aortic aneurysm Father         Father with ruptured thoracic aortic aneurysm   • Coronary artery disease Father         Father  from MI at 74   • Heart disease Father    • Hyperlipidemia Father    • Arthritis Father    • Coronary artery disease Maternal Grandmother         MGM deceeased from MI at age 76   • Malig Hyperthermia Neg Hx               Review of Systems   Constitutional: Positive for fatigue. Negative for activity change, appetite change, fever and unexpected weight change.   HENT: Negative for congestion, mouth sores, nosebleeds, sore throat and voice change.    Respiratory: Negative for cough, shortness of breath and wheezing.    Cardiovascular: Negative for chest pain, palpitations and leg swelling.   Gastrointestinal: Positive for nausea. Negative for abdominal distention, abdominal pain, blood in stool, constipation, diarrhea and vomiting.   Endocrine: Negative for cold intolerance and heat intolerance.   Genitourinary: Negative for difficulty urinating, dysuria, frequency and hematuria.   Musculoskeletal: Negative for arthralgias, back pain, joint swelling and myalgias.   Skin: Negative for rash.   Neurological: Positive for numbness. Negative for dizziness, syncope, weakness, light-headedness and headaches.   Hematological: Negative for adenopathy. Does not bruise/bleed easily.   Psychiatric/Behavioral: Negative for confusion and sleep disturbance. The patient is not nervous/anxious.          Objective      Vitals:    20 1104   BP: 173/80   Pulse: 70   Resp: 12   Temp: 98.4 °F (36.9 °C)   SpO2: 98%      Current Status  2/19/2020   ECOG score 1   Pain 0/10    Physical Exam   Constitutional: She is oriented to person, place, and time. She appears well-developed and well-nourished.   HENT:   Mouth/Throat: Oropharynx is clear and moist.   Eyes: Conjunctivae are normal.   Neck: No thyromegaly present.   Cardiovascular: Normal rate and regular rhythm. Exam reveals no gallop and no friction rub.   Murmur heard.  Pulmonary/Chest: Breath sounds normal. No respiratory distress.   Abdominal: Soft. Bowel sounds are normal. She exhibits no distension. There is no tenderness.   Musculoskeletal: She exhibits edema.   Trace to 1+ chronic bilateral lower extremity edema with stasis changes noted   Lymphadenopathy:        Head (right side): No submandibular adenopathy present.     She has no cervical adenopathy.     She has no axillary adenopathy.        Right: No inguinal and no supraclavicular adenopathy present.        Left: No inguinal and no supraclavicular adenopathy present.   Neurological: She is alert and oriented to person, place, and time. She displays normal reflexes. No cranial nerve deficit. She exhibits normal muscle tone.   Skin: Skin is warm and dry. No rash noted.   Psychiatric: She has a normal mood and affect. Her behavior is normal.   The patient was examined today, unchanged from above.    RECENT LABS:  Hematology WBC   Date Value Ref Range Status   02/19/2020 3.40 3.40 - 10.80 10*3/mm3 Final   03/16/2019 7.6 3.4 - 10.8 x10E3/uL Final     RBC   Date Value Ref Range Status   02/19/2020 2.84 (L) 3.77 - 5.28 10*6/mm3 Final   03/16/2019 3.80 3.77 - 5.28 x10E6/uL Final     Hemoglobin   Date Value Ref Range Status   02/19/2020 9.6 (L) 12.0 - 15.9 g/dL Final     Hematocrit   Date Value Ref Range Status   02/19/2020 29.3 (L) 34.0 - 46.6 % Final     Platelets   Date Value Ref Range Status   02/19/2020 295 140 - 450 10*3/mm3 Final        Lab Results   Component Value Date    GLUCOSE 249 (H) 02/19/2020    BUN 37 (H) 02/19/2020     CREATININE 2.40 (C) 02/19/2020    EGFRIFNONA 21 (L) 02/19/2020    EGFRIFAFRI 35 (L) 03/16/2019    BCR 15.4 02/19/2020    K 5.3 (H) 02/19/2020    CO2 25.6 02/19/2020    CALCIUM 8.9 02/19/2020    PROTENTOTREF 7.0 03/16/2019    ALBUMIN 3.80 02/19/2020    LABIL2 1.2 03/16/2019    AST 12 02/19/2020    ALT 9 02/19/2020         Assessment/Plan    1. Metastatic lobular breast cancer (ER/MO positive, HER-2/tj negative):  · History of stage IIIC right breast cancer ([id number])  · Patient treated previously in the [facility]  · Diagnosis via excisional biopsy 8/23/2003 with lobular carcinoma, 4.5 cm, positive margins.  ER/MO positive, HER-2/tj negative.  · Staging evaluation in September 2003 with negative bone scan and negative CT scans.  Ejection fraction 65%.  · Received neoadjuvant chemotherapy (? GET regimen) which apparently included Taxotere and possibly epirubicin.  Received 6 cycles.  · 1/22/2004 bilateral mastectomy with right axillary dissection.  Per available records, 10-12 cm residual invasive lobular carcinoma in the breast with 14/16 lymph nodes positive with extranodal extension.  Immediate reconstruction.  · Received adjuvant chemotherapy with carboplatin and navelbine x4 cycles  · Initiated adjuvant tamoxifen 6/22/2004  · Adjuvant radiation therapy initiated but interrupted due to chest wall cellulitis requiring removal of implants in August 2004  · Implant reconstruction again attempted with MRSA infection, implant removed 12/30/2005 with no further attempts made and reconstruction.  · Completed 5 years tamoxifen in June 2009 and subsequently transitioned to Femara.  Received Femara until June 2014.  · Patient experienced postmenopausal vaginal bleeding in 2013, negative endometrial biopsy and gynecologic evaluation.  · Patient last seen in [facility] 6/18/2014  · CT chest performed to evaluate bicuspid aortic valve and dilated asending aorta 7/12/2019.  CT showed no change in aorta however showed  new free intraperitoneal fluid in the upper abdomen with mesenteric edema, concerning for carcinomatosis.  · CT abdomen and pelvis (without IV contrast) on 7/16/2019 with mesenteric thickening, small volume of complex fluid in the mesentery which was suspicious and possibly representative of carcinomatosis, small amount of perihepatic fluid, small bowel loops tethered to omentum without obstruction, omental thickening, shotty retroperitoneal and pelvic lymph nodes (non-pathologically enlarged).  · PET scan 7/26/2019 with hypermetabolic omental activity, hypermetabolic small retroperitoneal lymph nodes, hypermetabolic activity throughout uterus with increase in size.  · CT-guided omental biopsy 7/30/2019 with metastatic lobular carcinoma of breast primary, ER positive (greater than 95%), VT positive (90%), HER-2/tj negative (1+ IHC)  · Baseline CA 15-3 on 7/22/19 was 32.6  · Initiation of Aromasin 25 mg daily 8/12/2019.  Initiated Ibrance at 100 mg 21/28 days on 8/26/2019.  · Improvement in CA 15-3 on 9/17/19-26.3  · Following 2 cycles of Aromasin and Ibrance, CA 15-3 declined to 25.9 on 10/15/2019.  CT scan chest abdomen pelvis 10/16/2019 showed improvement in the mesenteric and omental thickening and near resolution of complex ascites.  Treatment continued.  · Stable findings on subsequent CT chest abdomen pelvis 12/11/2019.  Further improvement in CA 15-3-23.9 on 12/18/2019.  Ibrance/Aromasin continued.  Plan 3-month interval CT scans.  · Foundation medicine liquid biopsy 2/5/2020: MSI undetermined, mutations in BETH, NF1, CHEK2.  No evidence of PIK3CA mutation.  · Patient returns today having initiated cycle 7 Ibrance on 2/9/2020 (100 mg daily for 21/28 days) and continuing on Aromasin 25 mg daily.  Patient has only some minimal nausea relieved with Zofran taken occasionally.  She is doing quite well on treatment.  She is already scheduled to undergo CT scans in  weeks and I will see her back in 4 weeks for  follow-up to review results.  2. Normocytic anemia in the setting of CKD3:  · The patient appears to have a chronic anemia with hemoglobin in the 10-11 range with normal MCV.  · Patient has underlying CKD3 with baseline creatinine recently in the 1.5-1.8 range.  · Anemia evaluation 7/22/2019 with iron 30, ferritin 85.2, iron saturation 10%, TIBC 311, B12 370, erythropoietin level 20.4  · Anemia felt in large part to be related to CKD3 as well as to underlying malignancy  · Evidence of folate deficiency 8/12/2019 with level 3.84, initiated folic acid 1 mg daily  · Additional labs on 12/18/2019 with iron 73, ferritin 82.2, iron saturation 25%, TIBC 290.  We did discuss the potential use of Procrit if her hemoglobin declines into the low 9/upper 8 range.  · Hemoglobin today stable at 9.6.  3. CKD3:  · Baseline creatinine recently in 1.6-2.0 range  · On 9/10/2019, RYAN/CKD3 with creatinine up to 2.44, BUN of 40.  Received 1 L normal saline.  Patient with increase in oral fluid intake.  · Renal ultrasound 9/20/2019 with no evidence of obstructive uropathy.  · Diminished oral intake due to nausea from Ibrance, creatinine 2.79 with BUN 43 on 10/15/2019.  Received 1 L normal saline.  Repeat labs on 10/18/2019 with BUN 31, creatinine 2.0.  · During cycle 3 Ibrance, patient developed increase in creatinine on 11/6/2019 to 2.35 and received 1 L normal saline.  · Patient is trying to maintain adequate oral hydration.    · It appears at this point that her new baseline creatinine is in the 2-2.4 range.    · Today, patient returns reporting adequate fluid intake orally.  Her creatinine today is in her baseline range at 2.4.  Potassium is slightly elevated again at 5.3 and we have discussed dietary issues.  She is not taking any supplemental potassium.  4. CHF with mild to moderate aortic stenosis:  · Recent cardiology evaluation with echocardiogram 7/12/2019 showing ejection fraction 48% with moderate dilation of the LV cavity,  global hypokinesis, grade 2 diastolic dysfunction, mild to moderate aortic stenosis, trivial pericardial effusion.  · CT chest 7/12/2019 with no change in the aorta compared to prior study 12/13/2017.  5. Left upper and bilateral lower extremity peripheral neuropathy:  · Patient reports that left upper extremity neuropathy was not present from previous chemotherapy but occurred after hospitalization that required intubation and critical care stay.  Apparently felt to represent critical care neuropathy.  Improved over time.  · Bilateral lower extremity neuropathy felt to be related to diabetes  · May have future implications regarding treatment for breast cancer.  6. Uterine/endometrial activity on PET scan:  · Patient experienced postmenopausal vaginal bleeding in 2013, negative endometrial biopsy and gynecologic evaluation.  · With findings on PET scan with enlarging uterus and some hypermetabolic activity, referred back to Dr. Oliveros and gynecology.    · 9/19/2019 D&C with hysteroscopy by Dr. Oliveros, no significant intraoperative findings, pathologic results with benign findings, no evidence of malignancy.  7. Nausea:  · Relieved with Zofran.  8. Myelosuppression secondary to Ibrance:  · With cycle 1, jessica WBC 2.49 with ANC 1.25 and platelet count 140,000.  · With cycle 2, jessica WBC 2.33 with ANC 1.06, maintained normal platelet count  · Today, WBC 3.4, ANC 2.09, platelet count 295,000.     Plan:  1. Continue Aromasin 25 mg daily.    2. Continue Ibrance 100 mg daily for 21/28 days, patient initiated cycle 6 on 2/9/2020.  3. Continue oral folic acid 1 mg daily  4. In 2 weeks CBC, CMP and RN review  5. In 3 weeks CT chest abdomen pelvis  6. In 4 weeks MD visit with CBC, CMP, CA 15-3.  Pending scan results, begin cycle 8 Aromasin/Ibrance.    Patient continues on high risk medication requiring intensive monitoring.

## 2020-03-04 ENCOUNTER — LAB (OUTPATIENT)
Dept: LAB | Facility: HOSPITAL | Age: 60
End: 2020-03-04

## 2020-03-04 ENCOUNTER — CLINICAL SUPPORT (OUTPATIENT)
Dept: ONCOLOGY | Facility: HOSPITAL | Age: 60
End: 2020-03-04

## 2020-03-04 DIAGNOSIS — C50.919 METASTATIC BREAST CANCER: ICD-10-CM

## 2020-03-04 LAB
ALBUMIN SERPL-MCNC: 4 G/DL (ref 3.5–5.2)
ALBUMIN/GLOB SERPL: 1 G/DL (ref 1.1–2.4)
ALP SERPL-CCNC: 75 U/L (ref 38–116)
ALT SERPL W P-5'-P-CCNC: 7 U/L (ref 0–33)
ANION GAP SERPL CALCULATED.3IONS-SCNC: 12 MMOL/L (ref 5–15)
AST SERPL-CCNC: 12 U/L (ref 0–32)
BASOPHILS # BLD AUTO: 0.07 10*3/MM3 (ref 0–0.2)
BASOPHILS NFR BLD AUTO: 2.3 % (ref 0–1.5)
BILIRUB SERPL-MCNC: 0.8 MG/DL (ref 0.2–1.2)
BUN BLD-MCNC: 36 MG/DL (ref 6–20)
BUN/CREAT SERPL: 15.3 (ref 7.3–30)
CALCIUM SPEC-SCNC: 9.2 MG/DL (ref 8.5–10.2)
CHLORIDE SERPL-SCNC: 104 MMOL/L (ref 98–107)
CO2 SERPL-SCNC: 27 MMOL/L (ref 22–29)
CREAT BLD-MCNC: 2.35 MG/DL (ref 0.6–1.1)
DEPRECATED RDW RBC AUTO: 53.9 FL (ref 37–54)
EOSINOPHIL # BLD AUTO: 0.1 10*3/MM3 (ref 0–0.4)
EOSINOPHIL NFR BLD AUTO: 3.3 % (ref 0.3–6.2)
ERYTHROCYTE [DISTWIDTH] IN BLOOD BY AUTOMATED COUNT: 14.8 % (ref 12.3–15.4)
GFR SERPL CREATININE-BSD FRML MDRD: 21 ML/MIN/1.73
GLOBULIN UR ELPH-MCNC: 3.9 GM/DL (ref 1.8–3.5)
GLUCOSE BLD-MCNC: 95 MG/DL (ref 74–124)
HCT VFR BLD AUTO: 30.5 % (ref 34–46.6)
HGB BLD-MCNC: 10 G/DL (ref 12–15.9)
IMM GRANULOCYTES # BLD AUTO: 0.01 10*3/MM3 (ref 0–0.05)
IMM GRANULOCYTES NFR BLD AUTO: 0.3 % (ref 0–0.5)
LYMPHOCYTES # BLD AUTO: 0.66 10*3/MM3 (ref 0.7–3.1)
LYMPHOCYTES NFR BLD AUTO: 22.1 % (ref 19.6–45.3)
MCH RBC QN AUTO: 33.6 PG (ref 26.6–33)
MCHC RBC AUTO-ENTMCNC: 32.8 G/DL (ref 31.5–35.7)
MCV RBC AUTO: 102.3 FL (ref 79–97)
MONOCYTES # BLD AUTO: 0.28 10*3/MM3 (ref 0.1–0.9)
MONOCYTES NFR BLD AUTO: 9.4 % (ref 5–12)
NEUTROPHILS # BLD AUTO: 1.87 10*3/MM3 (ref 1.7–7)
NEUTROPHILS NFR BLD AUTO: 62.6 % (ref 42.7–76)
NRBC BLD AUTO-RTO: 0 /100 WBC (ref 0–0.2)
PLATELET # BLD AUTO: 145 10*3/MM3 (ref 140–450)
PMV BLD AUTO: 8.6 FL (ref 6–12)
POTASSIUM BLD-SCNC: 5 MMOL/L (ref 3.5–4.7)
PROT SERPL-MCNC: 7.9 G/DL (ref 6.3–8)
RBC # BLD AUTO: 2.98 10*6/MM3 (ref 3.77–5.28)
SODIUM BLD-SCNC: 143 MMOL/L (ref 134–145)
WBC NRBC COR # BLD: 2.99 10*3/MM3 (ref 3.4–10.8)

## 2020-03-04 PROCEDURE — 36415 COLL VENOUS BLD VENIPUNCTURE: CPT

## 2020-03-04 PROCEDURE — 80053 COMPREHEN METABOLIC PANEL: CPT

## 2020-03-04 PROCEDURE — G0463 HOSPITAL OUTPT CLINIC VISIT: HCPCS

## 2020-03-04 PROCEDURE — 85025 COMPLETE CBC W/AUTO DIFF WBC: CPT

## 2020-03-04 NOTE — PROGRESS NOTES
CBC reviewed with pt. Pt is currently on her off week of Ibrance. She is due to restart on Sunday. She complains of neuropathy in her left arm that has worsened over the last 2 weeks. Pt currently takes gabapentin 300mg 4 tablets a day. D/W Dr. Irvin. Per Dr. Irvin, pt can increase her gabapentin to 600mg TID to see if this helps. Informed pt and she v/u. Since this is prescribed by her PCP, she will notify them of the increase.        Lab Results   Component Value Date    WBC 2.99 (L) 03/04/2020    HGB 10.0 (L) 03/04/2020    HCT 30.5 (L) 03/04/2020    .3 (H) 03/04/2020     03/04/2020

## 2020-03-05 ENCOUNTER — OFFICE VISIT (OUTPATIENT)
Dept: FAMILY MEDICINE CLINIC | Facility: CLINIC | Age: 60
End: 2020-03-05

## 2020-03-05 VITALS
BODY MASS INDEX: 44.41 KG/M2 | HEART RATE: 71 BPM | DIASTOLIC BLOOD PRESSURE: 78 MMHG | OXYGEN SATURATION: 97 % | SYSTOLIC BLOOD PRESSURE: 120 MMHG | RESPIRATION RATE: 16 BRPM | HEIGHT: 68 IN | WEIGHT: 293 LBS

## 2020-03-05 DIAGNOSIS — G62.89 OTHER POLYNEUROPATHY: ICD-10-CM

## 2020-03-05 DIAGNOSIS — E11.9 CONTROLLED TYPE 2 DIABETES MELLITUS WITHOUT COMPLICATION, WITH LONG-TERM CURRENT USE OF INSULIN (HCC): ICD-10-CM

## 2020-03-05 DIAGNOSIS — E11.22 CONTROLLED TYPE 2 DIABETES MELLITUS WITH CHRONIC KIDNEY DISEASE, WITH LONG-TERM CURRENT USE OF INSULIN, UNSPECIFIED CKD STAGE (HCC): ICD-10-CM

## 2020-03-05 DIAGNOSIS — E03.9 ACQUIRED HYPOTHYROIDISM: ICD-10-CM

## 2020-03-05 DIAGNOSIS — Z79.4 CONTROLLED TYPE 2 DIABETES MELLITUS WITHOUT COMPLICATION, WITH LONG-TERM CURRENT USE OF INSULIN (HCC): ICD-10-CM

## 2020-03-05 DIAGNOSIS — Z79.4 CONTROLLED TYPE 2 DIABETES MELLITUS WITH CHRONIC KIDNEY DISEASE, WITH LONG-TERM CURRENT USE OF INSULIN, UNSPECIFIED CKD STAGE (HCC): ICD-10-CM

## 2020-03-05 DIAGNOSIS — I10 ESSENTIAL HYPERTENSION: ICD-10-CM

## 2020-03-05 DIAGNOSIS — C50.919 METASTATIC BREAST CANCER: Primary | ICD-10-CM

## 2020-03-05 DIAGNOSIS — E78.2 MIXED HYPERLIPIDEMIA: ICD-10-CM

## 2020-03-05 PROCEDURE — 99214 OFFICE O/P EST MOD 30 MIN: CPT | Performed by: NURSE PRACTITIONER

## 2020-03-05 RX ORDER — ATORVASTATIN CALCIUM 40 MG/1
40 TABLET, FILM COATED ORAL DAILY
Qty: 90 TABLET | Refills: 1 | Status: SHIPPED | OUTPATIENT
Start: 2020-03-05 | End: 2020-09-18 | Stop reason: SDUPTHER

## 2020-03-05 RX ORDER — LEVOTHYROXINE SODIUM 0.05 MG/1
50 TABLET ORAL DAILY
Qty: 90 TABLET | Refills: 1 | Status: SHIPPED | OUTPATIENT
Start: 2020-03-05 | End: 2020-09-18 | Stop reason: SDUPTHER

## 2020-03-05 RX ORDER — INSULIN DETEMIR 100 [IU]/ML
50 INJECTION, SOLUTION SUBCUTANEOUS 2 TIMES DAILY
Qty: 3 PEN | Refills: 5 | Status: SHIPPED | OUTPATIENT
Start: 2020-03-05 | End: 2020-09-18 | Stop reason: SDUPTHER

## 2020-03-05 RX ORDER — GABAPENTIN 300 MG/1
600 CAPSULE ORAL 3 TIMES DAILY
Qty: 540 CAPSULE | Refills: 1 | Status: SHIPPED | OUTPATIENT
Start: 2020-03-05 | End: 2020-09-18 | Stop reason: SDUPTHER

## 2020-03-05 RX ORDER — REPAGLINIDE 2 MG/1
2 TABLET ORAL
Qty: 270 TABLET | Refills: 1 | Status: SHIPPED | OUTPATIENT
Start: 2020-03-05 | End: 2020-09-18 | Stop reason: SDUPTHER

## 2020-03-05 NOTE — PROGRESS NOTES
"Subjective   Juana Hall is a 59 y.o. female.     History of Present Illness    Since the last visit, she has overall felt well.  She has Essential Hypertension and well controlled on current medication and Hyperlipidemia with goals met with current Rx.  she has been compliant with current medications have reviewed them.  The patient denies medication side effects.  Will refill medications. /78   Pulse 71   Resp 16   Ht 172.7 cm (67.99\")   Wt (!) 176 kg (389 lb)   LMP  (LMP Unknown)   SpO2 97%   BMI 59.16 kg/m²     Results for orders placed or performed in visit on 03/04/20   Comprehensive Metabolic Panel   Result Value Ref Range    Glucose 95 74 - 124 mg/dL    BUN 36 (H) 6 - 20 mg/dL    Creatinine 2.35 (C) 0.60 - 1.10 mg/dL    Sodium 143 134 - 145 mmol/L    Potassium 5.0 (H) 3.5 - 4.7 mmol/L    Chloride 104 98 - 107 mmol/L    CO2 27.0 22.0 - 29.0 mmol/L    Calcium 9.2 8.5 - 10.2 mg/dL    Total Protein 7.9 6.3 - 8.0 g/dL    Albumin 4.00 3.50 - 5.20 g/dL    ALT (SGPT) 7 0 - 33 U/L    AST (SGOT) 12 0 - 32 U/L    Alkaline Phosphatase 75 38 - 116 U/L    Total Bilirubin 0.8 0.2 - 1.2 mg/dL    eGFR Non African Amer 21 (L) >60 mL/min/1.73    Globulin 3.9 (H) 1.8 - 3.5 gm/dL    A/G Ratio 1.0 (L) 1.1 - 2.4 g/dL    BUN/Creatinine Ratio 15.3 7.3 - 30.0    Anion Gap 12.0 5.0 - 15.0 mmol/L   CBC Auto Differential   Result Value Ref Range    WBC 2.99 (L) 3.40 - 10.80 10*3/mm3    RBC 2.98 (L) 3.77 - 5.28 10*6/mm3    Hemoglobin 10.0 (L) 12.0 - 15.9 g/dL    Hematocrit 30.5 (L) 34.0 - 46.6 %    .3 (H) 79.0 - 97.0 fL    MCH 33.6 (H) 26.6 - 33.0 pg    MCHC 32.8 31.5 - 35.7 g/dL    RDW 14.8 12.3 - 15.4 %    RDW-SD 53.9 37.0 - 54.0 fl    MPV 8.6 6.0 - 12.0 fL    Platelets 145 140 - 450 10*3/mm3    Neutrophil % 62.6 42.7 - 76.0 %    Lymphocyte % 22.1 19.6 - 45.3 %    Monocyte % 9.4 5.0 - 12.0 %    Eosinophil % 3.3 0.3 - 6.2 %    Basophil % 2.3 (H) 0.0 - 1.5 %    Immature Grans % 0.3 0.0 - 0.5 %    Neutrophils, " Absolute 1.87 1.70 - 7.00 10*3/mm3    Lymphocytes, Absolute 0.66 (L) 0.70 - 3.10 10*3/mm3    Monocytes, Absolute 0.28 0.10 - 0.90 10*3/mm3    Eosinophils, Absolute 0.10 0.00 - 0.40 10*3/mm3    Basophils, Absolute 0.07 0.00 - 0.20 10*3/mm3    Immature Grans, Absolute 0.01 0.00 - 0.05 10*3/mm3    nRBC 0.0 0.0 - 0.2 /100 WBC       Sees nephrologist Dr. Antonio for CKD.   She is currently under the care of her oncologist Dr. Irvin for metastatic breast cancer. She is on her 6th cycle of Ibrance. She saw Dr. Irvin on 2/19/20.  She was told she could increase gabapentin to 600 mg TID but this was not refilled for her.   The following portions of the patient's history were reviewed and updated as appropriate: allergies, current medications, past family history, past medical history, past social history, past surgical history and problem list.    Review of Systems   Constitutional: Negative for unexpected weight change.   Respiratory: Negative for shortness of breath.    Cardiovascular: Negative for chest pain and palpitations.   Endocrine: Negative for cold intolerance, heat intolerance, polydipsia, polyphagia and polyuria.   Psychiatric/Behavioral: Negative for behavioral problems.       Objective   Physical Exam   Constitutional: She is oriented to person, place, and time. She appears well-developed and well-nourished.   Neck: Carotid bruit is not present.   Cardiovascular: Normal rate and regular rhythm.   Pulmonary/Chest: Effort normal and breath sounds normal.   Neurological: She is alert and oriented to person, place, and time.   Psychiatric: She has a normal mood and affect. Judgment normal.   Nursing note and vitals reviewed.      Assessment/Plan   Juana was seen today for hyperlipidemia and hypertension.    Diagnoses and all orders for this visit:    Metastatic breast cancer (CMS/HCC)    Controlled type 2 diabetes mellitus with chronic kidney disease, with long-term current use of insulin, unspecified CKD stage  (CMS/[facility])  -     Lipid panel  -     Hemoglobin A1c    Mixed hyperlipidemia  -     atorvastatin (LIPITOR) 40 MG tablet; Take 1 tablet by mouth Daily.  -     Lipid panel    Essential hypertension    Other polyneuropathy  -     gabapentin (NEURONTIN) 300 MG capsule; Take 2 capsules by mouth 3 (Three) Times a Day.    Controlled type 2 diabetes mellitus without complication, with long-term current use of insulin (CMS/[facility])  -     Dulaglutide 1.5 MG/0.5ML solution pen-injector; Inject 1.5 mg under the skin into the appropriate area as directed 1 (One) Time Per Week. MONDAYS  -     LEVEMIR FLEXTOUCH 100 UNIT/ML injection; Inject 50 Units under the skin into the appropriate area as directed 2 (Two) Times a Day.  -     repaglinide (PRANDIN) 2 MG tablet; Take 1 tablet by mouth 3 (Three) Times a Day Before Meals.    Acquired hypothyroidism  -     levothyroxine (SYNTHROID, LEVOTHROID) 50 MCG tablet; Take 1 tablet by mouth Daily.  -     TSH  -     T4, free    Will have labs added to blood drawn yesterday per [facility].

## 2020-03-11 ENCOUNTER — HOSPITAL ENCOUNTER (OUTPATIENT)
Dept: PET IMAGING | Facility: HOSPITAL | Age: 60
Discharge: HOME | End: 2020-03-11

## 2020-03-13 ENCOUNTER — HOSPITAL ENCOUNTER (OUTPATIENT)
Dept: PET IMAGING | Facility: HOSPITAL | Age: 60
Discharge: HOME | End: 2020-03-13
Admitting: INTERNAL MEDICINE

## 2020-03-13 DIAGNOSIS — C50.919 METASTATIC BREAST CANCER: ICD-10-CM

## 2020-03-13 PROCEDURE — 74176 CT ABD & PELVIS W/O CONTRAST: CPT

## 2020-03-13 PROCEDURE — 0 DIATRIZOATE MEGLUMINE & SODIUM PER 1 ML: Performed by: INTERNAL MEDICINE

## 2020-03-13 PROCEDURE — 71250 CT THORAX DX C-: CPT

## 2020-03-13 RX ORDER — CARVEDILOL 3.12 MG/1
TABLET ORAL
Qty: 60 TABLET | Refills: 2 | Status: SHIPPED | OUTPATIENT
Start: 2020-03-13 | End: 2020-05-29

## 2020-03-13 RX ADMIN — DIATRIZOATE MEGLUMINE AND DIATRIZOATE SODIUM 30 ML: 660; 100 LIQUID ORAL; RECTAL at 13:10

## 2020-03-18 ENCOUNTER — OFFICE VISIT (OUTPATIENT)
Dept: ONCOLOGY | Facility: CLINIC | Age: 60
End: 2020-03-18

## 2020-03-18 ENCOUNTER — DOCUMENTATION (OUTPATIENT)
Dept: ONCOLOGY | Facility: CLINIC | Age: 60
End: 2020-03-18

## 2020-03-18 ENCOUNTER — LAB (OUTPATIENT)
Dept: LAB | Facility: HOSPITAL | Age: 60
End: 2020-03-18

## 2020-03-18 VITALS
HEART RATE: 72 BPM | TEMPERATURE: 98.6 F | RESPIRATION RATE: 16 BRPM | WEIGHT: 293 LBS | SYSTOLIC BLOOD PRESSURE: 157 MMHG | HEIGHT: 68 IN | OXYGEN SATURATION: 95 % | DIASTOLIC BLOOD PRESSURE: 61 MMHG | BODY MASS INDEX: 44.41 KG/M2

## 2020-03-18 DIAGNOSIS — C50.919 METASTATIC BREAST CANCER: ICD-10-CM

## 2020-03-18 DIAGNOSIS — C50.919 METASTATIC BREAST CANCER: Primary | ICD-10-CM

## 2020-03-18 LAB
ALBUMIN SERPL-MCNC: 4 G/DL (ref 3.5–5.2)
ALBUMIN/GLOB SERPL: 1 G/DL (ref 1.1–2.4)
ALP SERPL-CCNC: 68 U/L (ref 38–116)
ALT SERPL W P-5'-P-CCNC: 7 U/L (ref 0–33)
ANION GAP SERPL CALCULATED.3IONS-SCNC: 13.7 MMOL/L (ref 5–15)
AST SERPL-CCNC: 12 U/L (ref 0–32)
BASOPHILS # BLD AUTO: 0.1 10*3/MM3 (ref 0–0.2)
BASOPHILS NFR BLD AUTO: 2.5 % (ref 0–1.5)
BILIRUB SERPL-MCNC: 0.7 MG/DL (ref 0.2–1.2)
BUN BLD-MCNC: 36 MG/DL (ref 6–20)
BUN/CREAT SERPL: 14 (ref 7.3–30)
CALCIUM SPEC-SCNC: 9.2 MG/DL (ref 8.5–10.2)
CANCER AG15-3 SERPL-ACNC: 29.1 U/ML
CHLORIDE SERPL-SCNC: 102 MMOL/L (ref 98–107)
CO2 SERPL-SCNC: 26.3 MMOL/L (ref 22–29)
CREAT BLD-MCNC: 2.57 MG/DL (ref 0.6–1.1)
DEPRECATED RDW RBC AUTO: 53.3 FL (ref 37–54)
EOSINOPHIL # BLD AUTO: 0.15 10*3/MM3 (ref 0–0.4)
EOSINOPHIL NFR BLD AUTO: 3.8 % (ref 0.3–6.2)
ERYTHROCYTE [DISTWIDTH] IN BLOOD BY AUTOMATED COUNT: 14.2 % (ref 12.3–15.4)
GFR SERPL CREATININE-BSD FRML MDRD: 19 ML/MIN/1.73
GLOBULIN UR ELPH-MCNC: 4 GM/DL (ref 1.8–3.5)
GLUCOSE BLD-MCNC: 137 MG/DL (ref 74–124)
HCT VFR BLD AUTO: 30.1 % (ref 34–46.6)
HGB BLD-MCNC: 9.7 G/DL (ref 12–15.9)
IMM GRANULOCYTES # BLD AUTO: 0.04 10*3/MM3 (ref 0–0.05)
IMM GRANULOCYTES NFR BLD AUTO: 1 % (ref 0–0.5)
LYMPHOCYTES # BLD AUTO: 0.88 10*3/MM3 (ref 0.7–3.1)
LYMPHOCYTES NFR BLD AUTO: 22.2 % (ref 19.6–45.3)
MCH RBC QN AUTO: 33.3 PG (ref 26.6–33)
MCHC RBC AUTO-ENTMCNC: 32.2 G/DL (ref 31.5–35.7)
MCV RBC AUTO: 103.4 FL (ref 79–97)
MONOCYTES # BLD AUTO: 0.24 10*3/MM3 (ref 0.1–0.9)
MONOCYTES NFR BLD AUTO: 6.1 % (ref 5–12)
NEUTROPHILS # BLD AUTO: 2.55 10*3/MM3 (ref 1.7–7)
NEUTROPHILS NFR BLD AUTO: 64.4 % (ref 42.7–76)
NRBC BLD AUTO-RTO: 0 /100 WBC (ref 0–0.2)
PLATELET # BLD AUTO: 410 10*3/MM3 (ref 140–450)
PMV BLD AUTO: 9 FL (ref 6–12)
POTASSIUM BLD-SCNC: 5.6 MMOL/L (ref 3.5–4.7)
PROT SERPL-MCNC: 8 G/DL (ref 6.3–8)
RBC # BLD AUTO: 2.91 10*6/MM3 (ref 3.77–5.28)
SODIUM BLD-SCNC: 142 MMOL/L (ref 134–145)
WBC NRBC COR # BLD: 3.96 10*3/MM3 (ref 3.4–10.8)

## 2020-03-18 PROCEDURE — 86300 IMMUNOASSAY TUMOR CA 15-3: CPT | Performed by: INTERNAL MEDICINE

## 2020-03-18 PROCEDURE — 99215 OFFICE O/P EST HI 40 MIN: CPT | Performed by: INTERNAL MEDICINE

## 2020-03-18 PROCEDURE — 36415 COLL VENOUS BLD VENIPUNCTURE: CPT

## 2020-03-18 PROCEDURE — 80053 COMPREHEN METABOLIC PANEL: CPT

## 2020-03-18 PROCEDURE — 85025 COMPLETE CBC W/AUTO DIFF WBC: CPT

## 2020-03-18 NOTE — PROGRESS NOTES
Subjective .     REASONS FOR FOLLOWUP:    1. Metastatic lobular breast cancer (ER/DE positive, HER-2/tj negative):  · History of stage IIIC right breast cancer ([id number])  · Patient treated previously in the [facility] system  · Diagnosis via excisional biopsy 8/23/2003 with lobular carcinoma, 4.5 cm, positive margins.  ER/DE positive, HER-2/tj negative.  · Staging evaluation in September 2003 with negative bone scan and negative CT scans.  Ejection fraction 65%.  · Received neoadjuvant chemotherapy (? GET regimen) which apparently included Taxotere and possibly epirubicin.  Received 6 cycles.  · 1/22/2004 bilateral mastectomy with right axillary dissection.  Per available records, 10-12 cm residual invasive lobular carcinoma in the breast with 14/16 lymph nodes positive with extranodal extension.  Immediate reconstruction.  · Received adjuvant chemotherapy with carboplatin and navelbine x4 cycles  · Initiated adjuvant tamoxifen 6/22/2004  · Adjuvant radiation therapy initiated but interrupted due to chest wall cellulitis requiring removal of implants in August 2004  · Implant reconstruction again attempted with MRSA infection, implant removed 12/30/2005 with no further attempts made and reconstruction.  · Completed 5 years tamoxifen in June 2009 and subsequently transitioned to Femara.  Received Femara until June 2014.  · Patient experienced postmenopausal vaginal bleeding in 2013, negative endometrial biopsy and gynecologic evaluation.  · Patient last seen in [facility] 6/18/2014  · CT chest performed to evaluate bicuspid aortic valve and dilated asending aorta 7/12/2019.  CT showed no change in aorta however showed new free intraperitoneal fluid in the upper abdomen with mesenteric edema, concerning for carcinomatosis.  · CT abdomen and pelvis (without IV contrast) on 7/16/2019 with mesenteric thickening, small volume of complex fluid in the mesentery which was suspicious and possibly representative of  carcinomatosis, small amount of perihepatic fluid, small bowel loops tethered to omentum without obstruction, omental thickening, shotty retroperitoneal and pelvic lymph nodes (non-pathologically enlarged).  · PET scan 7/26/2019 with hypermetabolic omental activity, hypermetabolic small retroperitoneal lymph nodes, hypermetabolic activity throughout uterus with increase in size.  · CT-guided omental biopsy 7/30/2019 with metastatic lobular carcinoma of breast primary, ER positive (greater than 95%), [place] positive (90%), HER-2/tj negative (1+ IHC)  · Baseline CA 15-3 on 7/22/19 was 32.6  · Initiation of Aromasin 25 mg daily 8/12/2019.  Initiated Ibrance on 8/26/2019 at 100 mg 21/28 days on 8/26/2019.    · Response on CT scans 10/16/2019 following 2 cycles of Ibrance/Aromasin.  · Stable findings on subsequent CT chest abdomen pelvis 12/11/2019.  Further improvement in CA 15-3-23.9 on 12/18/2019.  Ibrance/Aromasin continued.    · Foundation medicine liquid biopsy 2/5/2020: MSI undetermined, mutations in BETH, NF1, CHEK2.  No evidence of PIK3CA mutation.  · Slight increase in CA 15-three 227.1 on 2/19/2020 and 29.1 on 3/18/2020.  CT however on 3/13/2020 with no new findings.  2. Anemia:  · Normocytic anemia, hemoglobin in 10-11 range  · Anemia evaluation 7/22/2019 with iron 30, ferritin 85.2, iron saturation 10%, TIBC 311, B12 370, erythropoietin level 20.4  · Anemia felt in large part to be related to CKD3 as well as to underlying malignancy  · Evidence of folate deficiency 8/12/2019 with level 3.84, initiated folic acid 1 mg daily  · If hemoglobin consistently below 10 consider use of Procrit      HISTORY OF PRESENT ILLNESS:  The patient is a 59 y.o. year old female who is here for follow-up with the above-mentioned history.    History of Present Illness   patient returns today in follow-up continuing on Aromasin 25 mg daily and Ibrance 100 mg daily for 21/28 days.  The patient has laboratory studies and CT scans to  review today.  In the interval, she reports that she has passed at least a few kidney stones.  This occurred a few weeks ago with associated significant pain and hematuria.  Symptoms were brief and she did not seek medical attention.  She has had multiple prior episodes of nephrolithiasis.  The patient had increased her gabapentin dose as well to 600 mg 3 times daily and developed blurred vision and lightheadedness.  She cut back on her dose and then again increased to 600 mg 3 times daily and is tolerating the dose currently and notes that the neuropathy symptoms in her left upper extremity have diminished.  She tries to keep up with adequate fluid intake.  She notes that she will be seeing her nephrologist today.  She notes a normal appetite and denies any abdominal pain currently.  The patient has some minimal intermittent nausea.    Past Medical History:   Diagnosis Date   • Anemia    • Anxiety and depression    • Bicuspid aortic valve    • Breast cancer (CMS/HCC)     RIGHT, HAD NEELA. MASTECTOMY, CHEMO AND RADIATION   • CKD (chronic kidney disease)    • Diabetes mellitus (CMS/HCC)    • Frequent UTI    • Heart murmur    • History of kidney stones    • History of MRSA infection     POST BREAST SURGERY-TREATED [facility]   • History of sepsis     KIDNEY STONE   • Hyperlipidemia    • Hypertension    • Hyperthyroidism    • Hypothyroidism    • Lymphedema of leg    • Metastasis from breast cancer (CMS/HCC)     STOMACH-OMENTUM   • Neuromuscular disorder (CMS/HCC)    • Obesity    • ANDREW on CPAP    • Osteoarthrosis    • Radiculopathy    • Thickened endometrium    • Tremor      Past Surgical History:   Procedure Laterality Date   • BREAST RECONSTRUCTION      WITH IMPLANTS, AND REVISION, NOW REMOVED   • BREAST SURGERY     •  SECTION  1987   •  SECTION  1987   • D&C HYSTEROSCOPY N/A 2017    Procedure: DILATATION AND CURETTAGE, HYSTEROSCOPY ;  Surgeon: Cherie Oliveros MD;   [place] [facility] [place] [facility] [place]  Service:    • D&C HYSTEROSCOPY N/A 9/19/2019    Procedure: DILATATION AND CURETTAGE HYSTEROSCOPY/POLYPECTOMY;  Surgeon: Cherie Oliveros MD;  [place] [facility] [place] [facility] [place]  Service: Gynecology   • LYMPH NODE BIOPSY     • MASTECTOMY Bilateral 01/2003   • MASTECTOMY Bilateral 2004         ONCOLOGIC HISTORY:  (History from previous dates can be found in the separate document.)    Current Outpatient Medications on File Prior to Visit   Medication Sig Dispense Refill   • amLODIPine (NORVASC) 10 MG tablet Take 10 mg by mouth Every Morning.     • aspirin 81 MG EC tablet Take 81 mg by mouth Daily. HELD FOR SURGERY     • atorvastatin (LIPITOR) 40 MG tablet Take 1 tablet by mouth Daily. 90 tablet 1   • carvedilol (COREG) 3.125 MG tablet TAKE ONE TABLET BY MOUTH TWICE A DAY 60 tablet 2   • Dulaglutide 1.5 MG/0.5ML solution pen-injector Inject 1.5 mg under the skin into the appropriate area as directed 1 (One) Time Per Week. MONDAYS 12 pen 1   • DULoxetine (CYMBALTA) 60 MG capsule      • exemestane (AROMASIN) 25 MG chemo tablet TAKE ONE TABLET BY MOUTH DAILY 30 tablet 3   • folic acid (FOLVITE) 1 MG tablet TAKE ONE TABLET BY MOUTH DAILY 30 tablet 3   • gabapentin (NEURONTIN) 300 MG capsule Take 2 capsules by mouth 3 (Three) Times a Day. 540 capsule 1   • LEVEMIR FLEXTOUCH 100 UNIT/ML injection Inject 50 Units under the skin into the appropriate area as directed 2 (Two) Times a Day. 3 pen 5   • levothyroxine (SYNTHROID, LEVOTHROID) 50 MCG tablet Take 1 tablet by mouth Daily. 90 tablet 1   • ondansetron (ZOFRAN) 8 MG tablet Take 1 tablet by mouth Every 8 (Eight) Hours As Needed for Nausea or Vomiting. 30 tablet 2   • Palbociclib (IBRANCE) 100 MG capsule capsule TAKE 1 CAPSULE BY MOUTH DAILY FOR 21 DAYS ON THEN 7 DAYS OFF 21 capsule 5   • repaglinide (PRANDIN) 2 MG tablet Take 1 tablet by mouth 3 (Three) Times a Day Before Meals. 270 tablet 1   • vitamin D (ERGOCALCIFEROL) 48289 units capsule capsule 50,000  Units Every 7 (Seven) Days.       No current facility-administered medications on file prior to visit.        ALLERGIES:   No Known Allergies    Social History     Socioeconomic History   • Marital status:      Spouse name: Not on file   • Number of children: Not on file   • Years of education: Not on file   • Highest education level: Not on file   Tobacco Use   • Smoking status: Never Smoker   • Smokeless tobacco: Never Used   Substance and Sexual Activity   • Alcohol use: No   • Drug use: No   • Sexual activity: Yes     Partners: Male     Birth control/protection: Post-menopausal     Family History   Problem Relation Age of Onset   • Coronary artery disease Mother         Mother  from MI at 72   • Diabetes Mother    • Breast cancer Mother    • Hyperlipidemia Mother    • Arthritis Mother    • Cancer Mother    • Aortic aneurysm Father         Father with ruptured thoracic aortic aneurysm   • Coronary artery disease Father         Father  from MI at 74   • Heart disease Father    • Hyperlipidemia Father    • Arthritis Father    • Coronary artery disease Maternal Grandmother         MGM deceeased from MI at age 76   • Malig Hyperthermia Neg Hx               Review of Systems   Constitutional: Positive for fatigue. Negative for activity change, appetite change, fever and unexpected weight change.   HENT: Negative for congestion, mouth sores, nosebleeds, sore throat and voice change.    Respiratory: Negative for cough, shortness of breath and wheezing.    Cardiovascular: Negative for chest pain, palpitations and leg swelling.   Gastrointestinal: Positive for nausea. Negative for abdominal distention, abdominal pain, blood in stool, constipation, diarrhea and vomiting.   Endocrine: Negative for cold intolerance and heat intolerance.   Genitourinary: Negative for difficulty urinating, dysuria, frequency and hematuria.   Musculoskeletal: Negative for arthralgias, back pain, joint swelling and  myalgias.   Skin: Negative for rash.   Neurological: Positive for numbness. Negative for dizziness, syncope, weakness, light-headedness and headaches.   Hematological: Negative for adenopathy. Does not bruise/bleed easily.   Psychiatric/Behavioral: Negative for confusion and sleep disturbance. The patient is not nervous/anxious.          Objective      Vitals:    03/18/20 1118   BP: 157/61   Pulse: 72   Resp: 16   Temp: 98.6 °F (37 °C)   SpO2: 95%      Current Status 3/18/2020   ECOG score 0   Pain 0/10    Physical Exam   Constitutional: She is oriented to person, place, and time. She appears well-developed and well-nourished.   HENT:   Mouth/Throat: Oropharynx is clear and moist.   Eyes: Conjunctivae are normal.   Neck: No thyromegaly present.   Cardiovascular: Normal rate and regular rhythm. Exam reveals no gallop and no friction rub.   Murmur heard.  Pulmonary/Chest: Breath sounds normal. No respiratory distress.   Abdominal: Soft. Bowel sounds are normal. She exhibits no distension. There is no tenderness.   Musculoskeletal: She exhibits edema.   Trace to 1+ chronic bilateral lower extremity edema with stasis changes noted   Lymphadenopathy:        Head (right side): No submandibular adenopathy present.     She has no cervical adenopathy.     She has no axillary adenopathy.        Right: No inguinal and no supraclavicular adenopathy present.        Left: No inguinal and no supraclavicular adenopathy present.   Neurological: She is alert and oriented to person, place, and time. She displays normal reflexes. No cranial nerve deficit. She exhibits normal muscle tone.   Skin: Skin is warm and dry. No rash noted.   Psychiatric: She has a normal mood and affect. Her behavior is normal.   The patient was examined today, unchanged from above.    RECENT LABS:  Hematology WBC   Date Value Ref Range Status   03/18/2020 3.96 3.40 - 10.80 10*3/mm3 Final   03/16/2019 7.6 3.4 - 10.8 x10E3/uL Final     RBC   Date Value Ref  Range Status   03/18/2020 2.91 (L) 3.77 - 5.28 10*6/mm3 Final   03/16/2019 3.80 3.77 - 5.28 x10E6/uL Final     Hemoglobin   Date Value Ref Range Status   03/18/2020 9.7 (L) 12.0 - 15.9 g/dL Final     Hematocrit   Date Value Ref Range Status   03/18/2020 30.1 (L) 34.0 - 46.6 % Final     Platelets   Date Value Ref Range Status   03/18/2020 410 140 - 450 10*3/mm3 Final        Lab Results   Component Value Date    GLUCOSE 137 (H) 03/18/2020    BUN 36 (H) 03/18/2020    CREATININE 2.57 (C) 03/18/2020    EGFRIFNONA 19 (L) 03/18/2020    EGFRIFAFRI 35 (L) 03/16/2019    BCR 14.0 03/18/2020    K 5.6 (C) 03/18/2020    CO2 26.3 03/18/2020    CALCIUM 9.2 03/18/2020    PROTENTOTREF 7.0 03/16/2019    ALBUMIN 4.00 03/18/2020    LABIL2 1.2 03/16/2019    AST 12 03/18/2020    ALT 7 03/18/2020     CT scans 3/13/2020 were reviewed today as outlined below.  I did personally review CT images.    Assessment/Plan    1. Metastatic lobular breast cancer (ER/VT positive, HER-2/tj negative):  · History of stage IIIC right breast cancer ([id number])  · Patient treated previously in the [facility]  · Diagnosis via excisional biopsy 8/23/2003 with lobular carcinoma, 4.5 cm, positive margins.  ER/VT positive, HER-2/tj negative.  · Staging evaluation in September 2003 with negative bone scan and negative CT scans.  Ejection fraction 65%.  · Received neoadjuvant chemotherapy (? GET regimen) which apparently included Taxotere and possibly epirubicin.  Received 6 cycles.  · 1/22/2004 bilateral mastectomy with right axillary dissection.  Per available records, 10-12 cm residual invasive lobular carcinoma in the breast with 14/16 lymph nodes positive with extranodal extension.  Immediate reconstruction.  · Received adjuvant chemotherapy with carboplatin and navelbine x4 cycles  · Initiated adjuvant tamoxifen 6/22/2004  · Adjuvant radiation therapy initiated but interrupted due to chest wall cellulitis requiring removal of implants in August  2004  · Implant reconstruction again attempted with MRSA infection, implant removed 12/30/2005 with no further attempts made and reconstruction.  · Completed 5 years tamoxifen in June 2009 and subsequently transitioned to Femara.  Received Femara until June 2014.  · Patient experienced postmenopausal vaginal bleeding in 2013, negative endometrial biopsy and gynecologic evaluation.  · Patient last seen in [facility] 6/18/2014  · CT chest performed to evaluate bicuspid aortic valve and dilated asending aorta 7/12/2019.  CT showed no change in aorta however showed new free intraperitoneal fluid in the upper abdomen with mesenteric edema, concerning for carcinomatosis.  · CT abdomen and pelvis (without IV contrast) on 7/16/2019 with mesenteric thickening, small volume of complex fluid in the mesentery which was suspicious and possibly representative of carcinomatosis, small amount of perihepatic fluid, small bowel loops tethered to omentum without obstruction, omental thickening, shotty retroperitoneal and pelvic lymph nodes (non-pathologically enlarged).  · PET scan 7/26/2019 with hypermetabolic omental activity, hypermetabolic small retroperitoneal lymph nodes, hypermetabolic activity throughout uterus with increase in size.  · CT-guided omental biopsy 7/30/2019 with metastatic lobular carcinoma of breast primary, ER positive (greater than 95%), RI positive (90%), HER-2/tj negative (1+ IHC)  · Baseline CA 15-3 on 7/22/19 was 32.6  · Initiation of Aromasin 25 mg daily 8/12/2019.  Initiated Ibrance at 100 mg 21/28 days on 8/26/2019.  · Improvement in CA 15-3 on 9/17/19-26.3  · Following 2 cycles of Aromasin and Ibrance, CA 15-3 declined to 25.9 on 10/15/2019.  CT scan chest abdomen pelvis 10/16/2019 showed improvement in the mesenteric and omental thickening and near resolution of complex ascites.  Treatment continued.  · Stable findings on subsequent CT chest abdomen pelvis 12/11/2019.  Further improvement in CA  15-3-23.9 on 12/18/2019.  Ibrance/Aromasin continued.   · Foundation medicine liquid biopsy 2/5/2020: MSI undetermined, mutations in BETH, NF1, CHEK2.  No evidence of PIK3CA mutation.  · Patient returns today having initiated cycle 7 Ibrance on 2/9/2020 (100 mg daily for 21/28 days) and continuing on Aromasin 25 mg daily.  Patient has only some minimal nausea relieved with Zofran taken occasionally.  Overall she is doing very well clinically and has no symptoms related to her disease.  She does note experiencing an episode of nephrolithiasis a few weeks ago with severe pain followed by hematuria and visible stones that were passed in her urine.  Her symptoms and hematuria resolved promptly and she did not seek medical attention.  She also notes some recent symptoms related to increasing dose of gabapentin with blurred vision and dizziness.  She cut back on her dose and then increased again and was able to tolerate the current dose.  She has experienced some improvement in the neuropathy symptoms in her left arm.  The patient has experienced a gradual increase in her CA 15-3 up to 27.1 on 2/19/2020 and 29.1 on 3/18/2020.  This is of unclear significance.  We reviewed her CT from 3/13/2020 which shows stable findings, no significant residual carcinomatosis is evident.  In fact some of the small retroperitoneal and iliac lymph nodes may have decreased compared to prior imaging from July 2019.  There again was a comment regarding the appearance of her bladder with wall thickening and perivesicular fat stranding questioning whether she has cystitis.  The patient notes some urinary frequency but has no significant urinary issues apart from her recent episode of nephrolithiasis as outlined above.  The bladder appearance is stable.  We did discuss potential referral to urology with cystoscopy however I am not sure that that is necessary at this point and she does not wish to pursue this.  There was also a comment regarding  diffuse gastric wall thickening suggesting gastritis.  She is asymptomatic.  We did discuss that this could represent changes from carcinomatosis however the patient is asymptomatic in the previous areas of disease involvement remain improved.  I have recommended that she continue on her current regime.  She agrees.  Given the stability of her counts recently we will try to avoid her coming into the office in 2 weeks for labs and I will plan to see her in 4 weeks for follow-up.  We will go ahead and schedule a repeat scan in 7 weeks and I will see her again in 8 weeks.  At that point we will also repeat her CA 15-3.  She was instructed to notify us in the interval with any new symptoms.  2. Normocytic anemia in the setting of CKD3:  · The patient appears to have a chronic anemia with hemoglobin in the 10-11 range with normal MCV.  · Patient has underlying CKD3 with baseline creatinine recently in the 1.5-1.8 range.  · Anemia evaluation 7/22/2019 with iron 30, ferritin 85.2, iron saturation 10%, TIBC 311, B12 370, erythropoietin level 20.4  · Anemia felt in large part to be related to CKD3 as well as to underlying malignancy  · Evidence of folate deficiency 8/12/2019 with level 3.84, initiated folic acid 1 mg daily  · Additional labs on 12/18/2019 with iron 73, ferritin 82.2, iron saturation 25%, TIBC 290.  We did discuss the potential use of Procrit if her hemoglobin declines into the low 9/upper 8 range.  · Hemoglobin today stable at 9.7.  3. CKD3:  · Baseline creatinine recently in 1.6-2.0 range  · On 9/10/2019, RYAN/CKD3 with creatinine up to 2.44, BUN of 40.  Received 1 L normal saline.  Patient with increase in oral fluid intake.  · Renal ultrasound 9/20/2019 with no evidence of obstructive uropathy.  · Diminished oral intake due to nausea from Ibrance, creatinine 2.79 with BUN 43 on 10/15/2019.  Received 1 L normal saline.  Repeat labs on 10/18/2019 with BUN 31, creatinine 2.0.  · During cycle 3 Ibrance,  patient developed increase in creatinine on 11/6/2019 to 2.35 and received 1 L normal saline.  · Patient is trying to maintain adequate oral hydration.    · It appears at this point that her new baseline creatinine is in the 2-2.4 range.    · Today, the patient has labs showing a slight increase in creatinine above her recent baseline at 2.57 with a BUN of 36.  She again reports adequate fluid intake orally.  She also has an elevated potassium again at 5.6, continues to deny any significant dietary intake.  She will be seeing her nephrologist later today and we will await their recommendations.  4. CHF with mild to moderate aortic stenosis:  · Recent cardiology evaluation with echocardiogram 7/12/2019 showing ejection fraction 48% with moderate dilation of the LV cavity, global hypokinesis, grade 2 diastolic dysfunction, mild to moderate aortic stenosis, trivial pericardial effusion.  · CT chest 7/12/2019 with no change in the aorta compared to prior study 12/13/2017.  ·   5. Left upper and bilateral lower extremity peripheral neuropathy:  · Patient reports that left upper extremity neuropathy was not present from previous chemotherapy but occurred after hospitalization that required intubation and critical care stay.  Apparently felt to represent critical care neuropathy.  Improved over time.  · Bilateral lower extremity neuropathy felt to be related to diabetes  · May have future implications regarding treatment for breast cancer.  · The patient is currently continuing on gabapentin 600 mg 3 times daily.  6. Uterine/endometrial activity on PET scan:  · Patient experienced postmenopausal vaginal bleeding in 2013, negative endometrial biopsy and gynecologic evaluation.  · With findings on PET scan with enlarging uterus and some hypermetabolic activity, referred back to Dr. Oliveros and gynecology.    · 9/19/2019 D&C with hysteroscopy by Dr. Oliveros, no significant intraoperative findings, pathologic results with benign  findings, no evidence of malignancy.  7. Nausea:  · Mild, relieved with Zofran.  8. Myelosuppression secondary to Ibrance:  · With cycle 1, jessica WBC 2.49 with ANC 1.25 and platelet count 140,000.  · With cycle 2, jessica WBC 2.33 with ANC 1.06, maintained normal platelet count  · Today, WBC 3.96 with ANC 2.55 and platelet count 410,000, acceptable to proceed on with treatment as planned.     Plan:  1. Continue Aromasin 25 mg daily.    2. Continue Ibrance 100 mg daily for 21/28 days, patient initiated current cycle on 3/8/2020.  3. Follow-up as scheduled today with nephrology  4. Continue oral folic acid 1 mg daily  5. In 4 weeks MD visit with CBC, CMP  6. In 7 weeks CT chest abdomen pelvis  7. In 8 weeks MD visit with CBC, CMP, CA 15-3.     Patient continues on high risk medication requiring intensive monitoring.

## 2020-03-30 RX ORDER — FOLIC ACID 1 MG/1
TABLET ORAL
Qty: 90 TABLET | Refills: 0 | Status: SHIPPED | OUTPATIENT
Start: 2020-03-30 | End: 2020-06-15

## 2020-04-10 RX ORDER — EXEMESTANE 25 MG/1
TABLET ORAL
Qty: 30 TABLET | Refills: 3 | Status: SHIPPED | OUTPATIENT
Start: 2020-04-10 | End: 2020-09-08

## 2020-04-14 ENCOUNTER — LAB (OUTPATIENT)
Dept: LAB | Facility: HOSPITAL | Age: 60
End: 2020-04-14

## 2020-04-14 ENCOUNTER — TELEMEDICINE (OUTPATIENT)
Dept: ONCOLOGY | Facility: CLINIC | Age: 60
End: 2020-04-14

## 2020-04-14 VITALS
HEIGHT: 68 IN | SYSTOLIC BLOOD PRESSURE: 153 MMHG | HEART RATE: 85 BPM | RESPIRATION RATE: 16 BRPM | DIASTOLIC BLOOD PRESSURE: 80 MMHG | BODY MASS INDEX: 44.41 KG/M2 | OXYGEN SATURATION: 91 % | TEMPERATURE: 98.1 F | WEIGHT: 293 LBS

## 2020-04-14 DIAGNOSIS — C50.919 METASTATIC BREAST CANCER: Primary | ICD-10-CM

## 2020-04-14 DIAGNOSIS — C50.919 METASTATIC BREAST CANCER: ICD-10-CM

## 2020-04-14 LAB
ALBUMIN SERPL-MCNC: 3.9 G/DL (ref 3.5–5.2)
ALBUMIN/GLOB SERPL: 1 G/DL (ref 1.1–2.4)
ALP SERPL-CCNC: 67 U/L (ref 38–116)
ALT SERPL W P-5'-P-CCNC: 9 U/L (ref 0–33)
ANION GAP SERPL CALCULATED.3IONS-SCNC: 13.5 MMOL/L (ref 5–15)
AST SERPL-CCNC: 12 U/L (ref 0–32)
BASOPHILS # BLD AUTO: 0.1 10*3/MM3 (ref 0–0.2)
BASOPHILS NFR BLD AUTO: 2.3 % (ref 0–1.5)
BILIRUB SERPL-MCNC: 0.7 MG/DL (ref 0.2–1.2)
BUN BLD-MCNC: 30 MG/DL (ref 6–20)
BUN/CREAT SERPL: 12.4 (ref 7.3–30)
CALCIUM SPEC-SCNC: 9.1 MG/DL (ref 8.5–10.2)
CHLORIDE SERPL-SCNC: 101 MMOL/L (ref 98–107)
CO2 SERPL-SCNC: 26.5 MMOL/L (ref 22–29)
CREAT BLD-MCNC: 2.41 MG/DL (ref 0.6–1.1)
DEPRECATED RDW RBC AUTO: 54.4 FL (ref 37–54)
EOSINOPHIL # BLD AUTO: 0.15 10*3/MM3 (ref 0–0.4)
EOSINOPHIL NFR BLD AUTO: 3.5 % (ref 0.3–6.2)
ERYTHROCYTE [DISTWIDTH] IN BLOOD BY AUTOMATED COUNT: 14.5 % (ref 12.3–15.4)
GFR SERPL CREATININE-BSD FRML MDRD: 20 ML/MIN/1.73
GLOBULIN UR ELPH-MCNC: 3.9 GM/DL (ref 1.8–3.5)
GLUCOSE BLD-MCNC: 167 MG/DL (ref 74–124)
HCT VFR BLD AUTO: 30.4 % (ref 34–46.6)
HGB BLD-MCNC: 9.9 G/DL (ref 12–15.9)
IMM GRANULOCYTES # BLD AUTO: 0.04 10*3/MM3 (ref 0–0.05)
IMM GRANULOCYTES NFR BLD AUTO: 0.9 % (ref 0–0.5)
LYMPHOCYTES # BLD AUTO: 0.88 10*3/MM3 (ref 0.7–3.1)
LYMPHOCYTES NFR BLD AUTO: 20.3 % (ref 19.6–45.3)
MCH RBC QN AUTO: 33.8 PG (ref 26.6–33)
MCHC RBC AUTO-ENTMCNC: 32.6 G/DL (ref 31.5–35.7)
MCV RBC AUTO: 103.8 FL (ref 79–97)
MONOCYTES # BLD AUTO: 0.2 10*3/MM3 (ref 0.1–0.9)
MONOCYTES NFR BLD AUTO: 4.6 % (ref 5–12)
NEUTROPHILS # BLD AUTO: 2.96 10*3/MM3 (ref 1.7–7)
NEUTROPHILS NFR BLD AUTO: 68.4 % (ref 42.7–76)
NRBC BLD AUTO-RTO: 0 /100 WBC (ref 0–0.2)
PLATELET # BLD AUTO: 252 10*3/MM3 (ref 140–450)
PMV BLD AUTO: 8.6 FL (ref 6–12)
POTASSIUM BLD-SCNC: 4.8 MMOL/L (ref 3.5–4.7)
PROT SERPL-MCNC: 7.8 G/DL (ref 6.3–8)
RBC # BLD AUTO: 2.93 10*6/MM3 (ref 3.77–5.28)
SODIUM BLD-SCNC: 141 MMOL/L (ref 134–145)
WBC NRBC COR # BLD: 4.33 10*3/MM3 (ref 3.4–10.8)

## 2020-04-14 PROCEDURE — 80053 COMPREHEN METABOLIC PANEL: CPT

## 2020-04-14 PROCEDURE — 85025 COMPLETE CBC W/AUTO DIFF WBC: CPT

## 2020-04-14 PROCEDURE — 36415 COLL VENOUS BLD VENIPUNCTURE: CPT

## 2020-04-14 PROCEDURE — 99214 OFFICE O/P EST MOD 30 MIN: CPT | Performed by: INTERNAL MEDICINE

## 2020-04-14 NOTE — PROGRESS NOTES
Subjective .     REASONS FOR FOLLOWUP:    1. Metastatic lobular breast cancer (ER/UT positive, HER-2/tj negative):  · History of stage IIIC right breast cancer ([id number])  · Patient treated previously in the [facility]  · Diagnosis via excisional biopsy 8/23/2003 with lobular carcinoma, 4.5 cm, positive margins.  ER/UT positive, HER-2/tj negative.  · Staging evaluation in September 2003 with negative bone scan and negative CT scans.  Ejection fraction 65%.  · Received neoadjuvant chemotherapy (? GET regimen) which apparently included Taxotere and possibly epirubicin.  Received 6 cycles.  · 1/22/2004 bilateral mastectomy with right axillary dissection.  Per available records, 10-12 cm residual invasive lobular carcinoma in the breast with 14/16 lymph nodes positive with extranodal extension.  Immediate reconstruction.  · Received adjuvant chemotherapy with carboplatin and navelbine x4 cycles  · Initiated adjuvant tamoxifen 6/22/2004  · Adjuvant radiation therapy initiated but interrupted due to chest wall cellulitis requiring removal of implants in August 2004  · Implant reconstruction again attempted with MRSA infection, implant removed 12/30/2005 with no further attempts made and reconstruction.  · Completed 5 years tamoxifen in June 2009 and subsequently transitioned to Femara.  Received Femara until June 2014.  · Patient experienced postmenopausal vaginal bleeding in 2013, negative endometrial biopsy and gynecologic evaluation.  · Patient last seen in [facility] 6/18/2014  · CT chest performed to evaluate bicuspid aortic valve and dilated asending aorta 7/12/2019.  CT showed no change in aorta however showed new free intraperitoneal fluid in the upper abdomen with mesenteric edema, concerning for carcinomatosis.  · CT abdomen and pelvis (without IV contrast) on 7/16/2019 with mesenteric thickening, small volume of complex fluid in the mesentery which was suspicious and possibly representative of  carcinomatosis, small amount of perihepatic fluid, small bowel loops tethered to omentum without obstruction, omental thickening, shotty retroperitoneal and pelvic lymph nodes (non-pathologically enlarged).  · PET scan 7/26/2019 with hypermetabolic omental activity, hypermetabolic small retroperitoneal lymph nodes, hypermetabolic activity throughout uterus with increase in size.  · CT-guided omental biopsy 7/30/2019 with metastatic lobular carcinoma of breast primary, ER positive (greater than 95%), AR positive (90%), HER-2/tj negative (1+ IHC)  · Baseline CA 15-3 on 7/22/19 was 32.6  · Initiation of Aromasin 25 mg daily 8/12/2019.  Initiated Ibrance on 8/26/2019 at 100 mg 21/28 days on 8/26/2019.    · Response on CT scans 10/16/2019 following 2 cycles of Ibrance/Aromasin.  · Stable findings on subsequent CT chest abdomen pelvis 12/11/2019.  Further improvement in CA 15-3-23.9 on 12/18/2019.  Ibrance/Aromasin continued.    · Foundation medicine liquid biopsy 2/5/2020: MSI undetermined, mutations in BETH, NF1, CHEK2.  No evidence of PIK3CA mutation.  · Slight increase in CA 15-3 to 27.1 on 2/19/2020 and 29.1 on 3/18/2020.  CT however on 3/13/2020 with no new findings.  2. Anemia:  · Normocytic anemia, hemoglobin in 10-11 range  · Anemia evaluation 7/22/2019 with iron 30, ferritin 85.2, iron saturation 10%, TIBC 311, B12 370, erythropoietin level 20.4  · Anemia felt in large part to be related to CKD3 as well as to underlying malignancy  · Evidence of folate deficiency 8/12/2019 with level 3.84, initiated folic acid 1 mg daily  · If hemoglobin consistently below 10 consider use of Procrit      HISTORY OF PRESENT ILLNESS:  The patient is a 59 y.o. year old female who is here for follow-up with the above-mentioned history.    History of Present Illness   patient returns today in follow-up continuing on Aromasin 25 mg daily and Ibrance 100 mg daily for 21/28 days.  The patient has laboratory studies to review today.  The  patient is being seen today intended for a video visit however was transitioned to a telephone visit due to failure of the technology.  The patient did consent to video visit today.  Patient reports that she has been doing quite well since her last visit.  She has no new complaints.  She is tolerating treatment without significant side effects.  She reports a normal appetite.  She reports normal bowel function.  She denies any abdominal pain.  She has been trying to maintain adequate fluid intake.  She does have some mild fatigue which is a chronic issue.  She has some mild intermittent nausea which is unchanged.  She did see her nephrologist since her last visit with no changes to her medical regimen.    Past Medical History:   Diagnosis Date   • Anemia    • Anxiety and depression    • Bicuspid aortic valve    • Breast cancer (CMS/HCC)     RIGHT, HAD NEELA. MASTECTOMY, CHEMO AND RADIATION   • CKD (chronic kidney disease)    • Diabetes mellitus (CMS/HCC)    • Frequent UTI    • Heart murmur    • History of kidney stones    • History of MRSA infection     POST BREAST SURGERY-TREATED [facility]   • History of sepsis     KIDNEY STONE   • Hyperlipidemia    • Hypertension    • Hyperthyroidism    • Hypothyroidism    • Lymphedema of leg    • Metastasis from breast cancer (CMS/HCC)     STOMACH-OMENTUM   • Neuromuscular disorder (CMS/HCC)    • Obesity    • ANDREW on CPAP    • Osteoarthrosis    • Radiculopathy    • Thickened endometrium    • Tremor      Past Surgical History:   Procedure Laterality Date   • BREAST RECONSTRUCTION      WITH IMPLANTS, AND REVISION, NOW REMOVED   • BREAST SURGERY     •  SECTION  1987   •  SECTION  1987   • D&C HYSTEROSCOPY N/A 2017    Procedure: DILATATION AND CURETTAGE, HYSTEROSCOPY ;  Surgeon: Cherie Oliveros MD;  [place] [facility] [place] [facility] [place]  Service:    • D&C HYSTEROSCOPY N/A 2019    Procedure: DILATATION AND CURETTAGE  HYSTEROSCOPY/POLYPECTOMY;  Surgeon: Cherie Oliveros MD;  [place] [facility] [place] [facility] [place];  Service: Gynecology   • LYMPH NODE BIOPSY     • MASTECTOMY Bilateral 01/2003   • MASTECTOMY Bilateral 2004         ONCOLOGIC HISTORY:  (History from previous dates can be found in the separate document.)    Current Outpatient Medications on File Prior to Visit   Medication Sig Dispense Refill   • amLODIPine (NORVASC) 10 MG tablet Take 10 mg by mouth Every Morning.     • aspirin 81 MG EC tablet Take 81 mg by mouth Daily. HELD FOR SURGERY     • atorvastatin (LIPITOR) 40 MG tablet Take 1 tablet by mouth Daily. 90 tablet 1   • carvedilol (COREG) 3.125 MG tablet TAKE ONE TABLET BY MOUTH TWICE A DAY 60 tablet 2   • Dulaglutide 1.5 MG/0.5ML solution pen-injector Inject 1.5 mg under the skin into the appropriate area as directed 1 (One) Time Per Week. MONDAYS 12 pen 1   • DULoxetine (CYMBALTA) 60 MG capsule      • exemestane (AROMASIN) 25 MG chemo tablet TAKE ONE TABLET BY MOUTH DAILY 30 tablet 3   • folic acid (FOLVITE) 1 MG tablet TAKE ONE TABLET BY MOUTH DAILY 90 tablet 0   • gabapentin (NEURONTIN) 300 MG capsule Take 2 capsules by mouth 3 (Three) Times a Day. 540 capsule 1   • LEVEMIR FLEXTOUCH 100 UNIT/ML injection Inject 50 Units under the skin into the appropriate area as directed 2 (Two) Times a Day. 3 pen 5   • levothyroxine (SYNTHROID, LEVOTHROID) 50 MCG tablet Take 1 tablet by mouth Daily. 90 tablet 1   • ondansetron (ZOFRAN) 8 MG tablet Take 1 tablet by mouth Every 8 (Eight) Hours As Needed for Nausea or Vomiting. 30 tablet 2   • Palbociclib (IBRANCE) 100 MG capsule capsule TAKE 1 CAPSULE BY MOUTH DAILY FOR 21 DAYS ON THEN 7 DAYS OFF 21 capsule 5   • repaglinide (PRANDIN) 2 MG tablet Take 1 tablet by mouth 3 (Three) Times a Day Before Meals. 270 tablet 1   • vitamin D (ERGOCALCIFEROL) 62771 units capsule capsule 50,000 Units Every 7 (Seven) Days.       No current facility-administered medications on file prior to visit.         ALLERGIES:   No Known Allergies    Social History     Socioeconomic History   • Marital status:      Spouse name: Not on file   • Number of children: Not on file   • Years of education: Not on file   • Highest education level: Not on file   Tobacco Use   • Smoking status: Never Smoker   • Smokeless tobacco: Never Used   Substance and Sexual Activity   • Alcohol use: No   • Drug use: No   • Sexual activity: Yes     Partners: Male     Birth control/protection: Post-menopausal     Family History   Problem Relation Age of Onset   • Coronary artery disease Mother         Mother  from MI at 72   • Diabetes Mother    • Breast cancer Mother    • Hyperlipidemia Mother    • Arthritis Mother    • Cancer Mother    • Aortic aneurysm Father         Father with ruptured thoracic aortic aneurysm   • Coronary artery disease Father         Father  from MI at 74   • Heart disease Father    • Hyperlipidemia Father    • Arthritis Father    • Coronary artery disease Maternal Grandmother         MGM deceeased from MI at age 76   • Malig Hyperthermia Neg Hx               Review of Systems   Constitutional: Positive for fatigue. Negative for activity change, appetite change, fever and unexpected weight change.   HENT: Negative for congestion, mouth sores, nosebleeds, sore throat and voice change.    Respiratory: Negative for cough, shortness of breath and wheezing.    Cardiovascular: Negative for chest pain, palpitations and leg swelling.   Gastrointestinal: Positive for nausea. Negative for abdominal distention, abdominal pain, blood in stool, constipation, diarrhea and vomiting.   Endocrine: Negative for cold intolerance and heat intolerance.   Genitourinary: Negative for difficulty urinating, dysuria, frequency and hematuria.   Musculoskeletal: Negative for arthralgias, back pain, joint swelling and myalgias.   Skin: Negative for rash.   Neurological: Positive for numbness. Negative for dizziness, syncope,  weakness, light-headedness and headaches.   Hematological: Negative for adenopathy. Does not bruise/bleed easily.   Psychiatric/Behavioral: Negative for confusion and sleep disturbance. The patient is not nervous/anxious.          Objective      Vitals:    04/14/20 1139   BP: 153/80   Pulse: 85   Resp: 16   Temp: 98.1 °F (36.7 °C)   SpO2: 91%      Current Status 3/18/2020   ECOG score 0   Pain 0/10    Physical Exam   Constitutional: She is oriented to person, place, and time.   Neurological: She is alert and oriented to person, place, and time.   Psychiatric: She has a normal mood and affect. Her behavior is normal. Judgment and thought content normal.   The patient was seen as a video/telephone visit today and therefore no physical exam was performed.    RECENT LABS:  Hematology WBC   Date Value Ref Range Status   04/14/2020 4.33 3.40 - 10.80 10*3/mm3 Final   03/16/2019 7.6 3.4 - 10.8 x10E3/uL Final     RBC   Date Value Ref Range Status   04/14/2020 2.93 (L) 3.77 - 5.28 10*6/mm3 Final   03/16/2019 3.80 3.77 - 5.28 x10E6/uL Final     Hemoglobin   Date Value Ref Range Status   04/14/2020 9.9 (L) 12.0 - 15.9 g/dL Final     Hematocrit   Date Value Ref Range Status   04/14/2020 30.4 (L) 34.0 - 46.6 % Final     Platelets   Date Value Ref Range Status   04/14/2020 252 140 - 450 10*3/mm3 Final        Lab Results   Component Value Date    GLUCOSE 167 (H) 04/14/2020    BUN 30 (H) 04/14/2020    CREATININE 2.41 (C) 04/14/2020    EGFRIFNONA 20 (L) 04/14/2020    EGFRIFAFRI 35 (L) 03/16/2019    BCR 12.4 04/14/2020    K 4.8 (H) 04/14/2020    CO2 26.5 04/14/2020    CALCIUM 9.1 04/14/2020    PROTENTOTREF 7.0 03/16/2019    ALBUMIN 3.90 04/14/2020    LABIL2 1.2 03/16/2019    AST 12 04/14/2020    ALT 9 04/14/2020       Assessment/Plan    1. Metastatic lobular breast cancer (ER/IL positive, HER-2/tj negative):  · History of stage IIIC right breast cancer ([id number])  · Patient treated previously in the [facility]  · Diagnosis via  excisional biopsy 8/23/2003 with lobular carcinoma, 4.5 cm, positive margins.  ER/GA positive, HER-2/tj negative.  · Staging evaluation in September 2003 with negative bone scan and negative CT scans.  Ejection fraction 65%.  · Received neoadjuvant chemotherapy (? GET regimen) which apparently included Taxotere and possibly epirubicin.  Received 6 cycles.  · 1/22/2004 bilateral mastectomy with right axillary dissection.  Per available records, 10-12 cm residual invasive lobular carcinoma in the breast with 14/16 lymph nodes positive with extranodal extension.  Immediate reconstruction.  · Received adjuvant chemotherapy with carboplatin and navelbine x4 cycles  · Initiated adjuvant tamoxifen 6/22/2004  · Adjuvant radiation therapy initiated but interrupted due to chest wall cellulitis requiring removal of implants in August 2004  · Implant reconstruction again attempted with MRSA infection, implant removed 12/30/2005 with no further attempts made and reconstruction.  · Completed 5 years tamoxifen in June 2009 and subsequently transitioned to Femara.  Received Femara until June 2014.  · Patient experienced postmenopausal vaginal bleeding in 2013, negative endometrial biopsy and gynecologic evaluation.  · Patient last seen in [facility] 6/18/2014  · CT chest performed to evaluate bicuspid aortic valve and dilated asending aorta 7/12/2019.  CT showed no change in aorta however showed new free intraperitoneal fluid in the upper abdomen with mesenteric edema, concerning for carcinomatosis.  · CT abdomen and pelvis (without IV contrast) on 7/16/2019 with mesenteric thickening, small volume of complex fluid in the mesentery which was suspicious and possibly representative of carcinomatosis, small amount of perihepatic fluid, small bowel loops tethered to omentum without obstruction, omental thickening, shotty retroperitoneal and pelvic lymph nodes (non-pathologically enlarged).  · PET scan 7/26/2019 with hypermetabolic  omental activity, hypermetabolic small retroperitoneal lymph nodes, hypermetabolic activity throughout uterus with increase in size.  · CT-guided omental biopsy 7/30/2019 with metastatic lobular carcinoma of breast primary, ER positive (greater than 95%), OH positive (90%), HER-2/tj negative (1+ IHC)  · Baseline CA 15-3 on 7/22/19 was 32.6  · Initiation of Aromasin 25 mg daily 8/12/2019.  Initiated Ibrance at 100 mg 21/28 days on 8/26/2019.  · Improvement in CA 15-3 on 9/17/19-26.3  · Following 2 cycles of Aromasin and Ibrance, CA 15-3 declined to 25.9 on 10/15/2019.  CT scan chest abdomen pelvis 10/16/2019 showed improvement in the mesenteric and omental thickening and near resolution of complex ascites.  Treatment continued.  · Stable findings on subsequent CT chest abdomen pelvis 12/11/2019.  Further improvement in CA 15-3-23.9 on 12/18/2019.  Ibrance/Aromasin continued.   · Foundation medicine liquid biopsy 2/5/2020: MSI undetermined, mutations in BETH, NF1, CHEK2.  No evidence of PIK3CA mutation.  · Slight increase in CA 15-3 to 27.1 on 2/19/2020 and 29.1 on 3/18/2020.  CT however on 3/13/2020 with no new findings.  · The patient returns today in follow-up continuing on Aromasin 25 mg daily along with Ibrance 100 mg daily for 21/28 days with current cycle initiated on 4/5/2020.  The patient is continuing to tolerate treatment extremely well.  She has some mild fatigue and minimal nausea.  She has no clinical evidence of progression and no new side effects currently.  We will continue on with treatment as planned.  Her counts are acceptable today to proceed and we will plan to forego any repeat labs over the next 4 weeks to reduce risk of exposure to COVID19.  She will be due for CT scans in 3 weeks as scheduled and I will see her in 4 weeks for follow-up at which time we will also repeat her CA 15-3.  2. Normocytic anemia in the setting of CKD3:  · The patient appears to have a chronic anemia with hemoglobin in  the 10-11 range with normal MCV.  · Patient has underlying CKD3 with baseline creatinine recently in the 1.5-1.8 range.  · Anemia evaluation 7/22/2019 with iron 30, ferritin 85.2, iron saturation 10%, TIBC 311, B12 370, erythropoietin level 20.4  · Anemia felt in large part to be related to CKD3 as well as to underlying malignancy  · Evidence of folate deficiency 8/12/2019 with level 3.84, initiated folic acid 1 mg daily  · Additional labs on 12/18/2019 with iron 73, ferritin 82.2, iron saturation 25%, TIBC 290.  We did discuss the potential use of Procrit if her hemoglobin declines into the low 9/upper 8 range.  · Hemoglobin today stable at 9.9.  3. CKD3:  · Baseline creatinine recently in 1.6-2.0 range  · On 9/10/2019, RYAN/CKD3 with creatinine up to 2.44, BUN of 40.  Received 1 L normal saline.  Patient with increase in oral fluid intake.  · Renal ultrasound 9/20/2019 with no evidence of obstructive uropathy.  · Diminished oral intake due to nausea from Ibrance, creatinine 2.79 with BUN 43 on 10/15/2019.  Received 1 L normal saline.  Repeat labs on 10/18/2019 with BUN 31, creatinine 2.0.  · During cycle 3 Ibrance, patient developed increase in creatinine on 11/6/2019 to 2.35 and received 1 L normal saline.  · Patient is trying to maintain adequate oral hydration.    · It appears at this point that her new baseline creatinine is in the 2-2.4 range.    · Today, creatinine slightly improved at 2.41 with BUN 30.  Patient does continue with routine follow-up with nephrology.  4. CHF with mild to moderate aortic stenosis:  · Recent cardiology evaluation with echocardiogram 7/12/2019 showing ejection fraction 48% with moderate dilation of the LV cavity, global hypokinesis, grade 2 diastolic dysfunction, mild to moderate aortic stenosis, trivial pericardial effusion.  · CT chest 7/12/2019 with no change in the aorta compared to prior study 12/13/2017.  5. Left upper and bilateral lower extremity peripheral  neuropathy:  · Patient reports that left upper extremity neuropathy was not present from previous chemotherapy but occurred after hospitalization that required intubation and critical care stay.  Apparently felt to represent critical care neuropathy.  Improved over time.  · Bilateral lower extremity neuropathy felt to be related to diabetes  · May have future implications regarding treatment for breast cancer.  · The patient is currently continuing on gabapentin 600 mg 3 times daily.  6. Uterine/endometrial activity on PET scan:  · Patient experienced postmenopausal vaginal bleeding in 2013, negative endometrial biopsy and gynecologic evaluation.  · With findings on PET scan with enlarging uterus and some hypermetabolic activity, referred back to Dr. Oliveros and gynecology.    · 9/19/2019 D&C with hysteroscopy by Dr. Oliveros, no significant intraoperative findings, pathologic results with benign findings, no evidence of malignancy.  7. Nausea:  · Mild, relieved with Zofran.  8. Myelosuppression secondary to Ibrance:  · With cycle 1, jessica WBC 2.49 with ANC 1.25 and platelet count 140,000.  · With cycle 2, jessica WBC 2.33 with ANC 1.06, maintained normal platelet count  · Today, WBC 4.33 with ANC 2.96 and platelet count 252,000.     Plan:  1. Continue Aromasin 25 mg daily.    2. Continue Ibrance 100 mg daily for 21/28 days, patient initiated current cycle on 4/5/2020.  3. Continue oral folic acid 1 mg daily  4. In 3 weeks CT chest abdomen pelvis  5. In 4 weeks MD visit with CBC, CMP, CA 15-3.     Patient continues on high risk medication requiring intensive monitoring.    The patient did consent to video visit today.  Attempted video visit however transition to telephone visit due to failure of technology.

## 2020-04-29 ENCOUNTER — DOCUMENTATION (OUTPATIENT)
Dept: ONCOLOGY | Facility: CLINIC | Age: 60
End: 2020-04-29

## 2020-05-11 ENCOUNTER — HOSPITAL ENCOUNTER (OUTPATIENT)
Dept: PET IMAGING | Facility: HOSPITAL | Age: 60
Discharge: HOME | End: 2020-05-11
Admitting: INTERNAL MEDICINE

## 2020-05-11 DIAGNOSIS — C50.919 METASTATIC BREAST CANCER: ICD-10-CM

## 2020-05-11 PROCEDURE — 71250 CT THORAX DX C-: CPT

## 2020-05-11 PROCEDURE — 0 DIATRIZOATE MEGLUMINE & SODIUM PER 1 ML: Performed by: INTERNAL MEDICINE

## 2020-05-11 PROCEDURE — 74176 CT ABD & PELVIS W/O CONTRAST: CPT

## 2020-05-11 RX ADMIN — DIATRIZOATE MEGLUMINE AND DIATRIZOATE SODIUM 30 ML: 660; 100 LIQUID ORAL; RECTAL at 08:08

## 2020-05-15 ENCOUNTER — TELEPHONE (OUTPATIENT)
Dept: ONCOLOGY | Facility: HOSPITAL | Age: 60
End: 2020-05-15

## 2020-05-15 ENCOUNTER — TELEPHONE (OUTPATIENT)
Dept: GENERAL RADIOLOGY | Facility: HOSPITAL | Age: 60
End: 2020-05-15

## 2020-05-15 ENCOUNTER — OFFICE VISIT (OUTPATIENT)
Dept: ONCOLOGY | Facility: CLINIC | Age: 60
End: 2020-05-15

## 2020-05-15 ENCOUNTER — LAB (OUTPATIENT)
Dept: LAB | Facility: HOSPITAL | Age: 60
End: 2020-05-15

## 2020-05-15 VITALS
OXYGEN SATURATION: 95 % | RESPIRATION RATE: 20 BRPM | TEMPERATURE: 98.5 F | WEIGHT: 293 LBS | DIASTOLIC BLOOD PRESSURE: 67 MMHG | BODY MASS INDEX: 44.41 KG/M2 | HEART RATE: 81 BPM | SYSTOLIC BLOOD PRESSURE: 130 MMHG | HEIGHT: 68 IN

## 2020-05-15 DIAGNOSIS — C50.919 METASTATIC BREAST CANCER: Primary | ICD-10-CM

## 2020-05-15 DIAGNOSIS — C50.919 METASTATIC BREAST CANCER: ICD-10-CM

## 2020-05-15 LAB
ALBUMIN SERPL-MCNC: 4.1 G/DL (ref 3.5–5.2)
ALBUMIN/GLOB SERPL: 1.1 G/DL (ref 1.1–2.4)
ALP SERPL-CCNC: 60 U/L (ref 38–116)
ALT SERPL W P-5'-P-CCNC: 10 U/L (ref 0–33)
ANION GAP SERPL CALCULATED.3IONS-SCNC: 11.4 MMOL/L (ref 5–15)
AST SERPL-CCNC: 13 U/L (ref 0–32)
BASOPHILS # BLD AUTO: 0.06 10*3/MM3 (ref 0–0.2)
BASOPHILS NFR BLD AUTO: 2.1 % (ref 0–1.5)
BILIRUB SERPL-MCNC: 0.6 MG/DL (ref 0.2–1.2)
BUN BLD-MCNC: 39 MG/DL (ref 6–20)
BUN/CREAT SERPL: 14.6 (ref 7.3–30)
CALCIUM SPEC-SCNC: 9.2 MG/DL (ref 8.5–10.2)
CANCER AG15-3 SERPL-ACNC: 26.5 U/ML
CHLORIDE SERPL-SCNC: 106 MMOL/L (ref 98–107)
CO2 SERPL-SCNC: 26.6 MMOL/L (ref 22–29)
CREAT BLD-MCNC: 2.68 MG/DL (ref 0.6–1.1)
DEPRECATED RDW RBC AUTO: 54.2 FL (ref 37–54)
EOSINOPHIL # BLD AUTO: 0.14 10*3/MM3 (ref 0–0.4)
EOSINOPHIL NFR BLD AUTO: 4.9 % (ref 0.3–6.2)
ERYTHROCYTE [DISTWIDTH] IN BLOOD BY AUTOMATED COUNT: 14.3 % (ref 12.3–15.4)
GFR SERPL CREATININE-BSD FRML MDRD: 18 ML/MIN/1.73
GLOBULIN UR ELPH-MCNC: 3.8 GM/DL (ref 1.8–3.5)
GLUCOSE BLD-MCNC: 101 MG/DL (ref 74–124)
HCT VFR BLD AUTO: 30.1 % (ref 34–46.6)
HGB BLD-MCNC: 9.7 G/DL (ref 12–15.9)
IMM GRANULOCYTES # BLD AUTO: 0.01 10*3/MM3 (ref 0–0.05)
IMM GRANULOCYTES NFR BLD AUTO: 0.3 % (ref 0–0.5)
LYMPHOCYTES # BLD AUTO: 0.76 10*3/MM3 (ref 0.7–3.1)
LYMPHOCYTES NFR BLD AUTO: 26.4 % (ref 19.6–45.3)
MCH RBC QN AUTO: 33.8 PG (ref 26.6–33)
MCHC RBC AUTO-ENTMCNC: 32.2 G/DL (ref 31.5–35.7)
MCV RBC AUTO: 104.9 FL (ref 79–97)
MONOCYTES # BLD AUTO: 0.18 10*3/MM3 (ref 0.1–0.9)
MONOCYTES NFR BLD AUTO: 6.3 % (ref 5–12)
NEUTROPHILS # BLD AUTO: 1.73 10*3/MM3 (ref 1.7–7)
NEUTROPHILS NFR BLD AUTO: 60 % (ref 42.7–76)
NRBC BLD AUTO-RTO: 0 /100 WBC (ref 0–0.2)
PLATELET # BLD AUTO: 261 10*3/MM3 (ref 140–450)
PMV BLD AUTO: 8.8 FL (ref 6–12)
POTASSIUM BLD-SCNC: 5.3 MMOL/L (ref 3.5–4.7)
PROT SERPL-MCNC: 7.9 G/DL (ref 6.3–8)
RBC # BLD AUTO: 2.87 10*6/MM3 (ref 3.77–5.28)
SODIUM BLD-SCNC: 144 MMOL/L (ref 134–145)
WBC NRBC COR # BLD: 2.88 10*3/MM3 (ref 3.4–10.8)

## 2020-05-15 PROCEDURE — 85025 COMPLETE CBC W/AUTO DIFF WBC: CPT

## 2020-05-15 PROCEDURE — 36415 COLL VENOUS BLD VENIPUNCTURE: CPT

## 2020-05-15 PROCEDURE — 80053 COMPREHEN METABOLIC PANEL: CPT

## 2020-05-15 PROCEDURE — 86300 IMMUNOASSAY TUMOR CA 15-3: CPT | Performed by: INTERNAL MEDICINE

## 2020-05-15 PROCEDURE — 99215 OFFICE O/P EST HI 40 MIN: CPT | Performed by: INTERNAL MEDICINE

## 2020-05-15 RX ORDER — ONDANSETRON HYDROCHLORIDE 8 MG/1
8 TABLET, FILM COATED ORAL EVERY 8 HOURS PRN
Qty: 30 TABLET | Refills: 2 | Status: SHIPPED | OUTPATIENT
Start: 2020-05-15 | End: 2022-12-08 | Stop reason: SDUPTHER

## 2020-05-15 NOTE — PROGRESS NOTES
Subjective .     REASONS FOR FOLLOWUP:    1. Metastatic lobular breast cancer (ER/MA positive, HER-2/tj negative):  · History of stage IIIC right breast cancer ([id number])  · Patient treated previously in the [facility]  · Diagnosis via excisional biopsy 8/23/2003 with lobular carcinoma, 4.5 cm, positive margins.  ER/MA positive, HER-2/tj negative.  · Staging evaluation in September 2003 with negative bone scan and negative CT scans.  Ejection fraction 65%.  · Received neoadjuvant chemotherapy (? GET regimen) which apparently included Taxotere and possibly epirubicin.  Received 6 cycles.  · 1/22/2004 bilateral mastectomy with right axillary dissection.  Per available records, 10-12 cm residual invasive lobular carcinoma in the breast with 14/16 lymph nodes positive with extranodal extension.  Immediate reconstruction.  · Received adjuvant chemotherapy with carboplatin and navelbine x4 cycles  · Initiated adjuvant tamoxifen 6/22/2004  · Adjuvant radiation therapy initiated but interrupted due to chest wall cellulitis requiring removal of implants in August 2004  · Implant reconstruction again attempted with MRSA infection, implant removed 12/30/2005 with no further attempts made and reconstruction.  · Completed 5 years tamoxifen in June 2009 and subsequently transitioned to Femara.  Received Femara until June 2014.  · Patient experienced postmenopausal vaginal bleeding in 2013, negative endometrial biopsy and gynecologic evaluation.  · Patient last seen in [facility] 6/18/2014  · CT chest performed to evaluate bicuspid aortic valve and dilated asending aorta 7/12/2019.  CT showed no change in aorta however showed new free intraperitoneal fluid in the upper abdomen with mesenteric edema, concerning for carcinomatosis.  · CT abdomen and pelvis (without IV contrast) on 7/16/2019 with mesenteric thickening, small volume of complex fluid in the mesentery which was suspicious and possibly representative of  carcinomatosis, small amount of perihepatic fluid, small bowel loops tethered to omentum without obstruction, omental thickening, shotty retroperitoneal and pelvic lymph nodes (non-pathologically enlarged).  · PET scan 7/26/2019 with hypermetabolic omental activity, hypermetabolic small retroperitoneal lymph nodes, hypermetabolic activity throughout uterus with increase in size.  · CT-guided omental biopsy 7/30/2019 with metastatic lobular carcinoma of breast primary, ER positive (greater than 95%), TX positive (90%), HER-2/tj negative (1+ IHC)  · Baseline CA 15-3 on 7/22/19 was 32.6  · Initiation of Aromasin 25 mg daily 8/12/2019.  Initiated Ibrance on 8/26/2019 at 100 mg 21/28 days on 8/26/2019.    · Response on CT scans 10/16/2019 following 2 cycles of Ibrance/Aromasin.  · Stable findings on subsequent CT chest abdomen pelvis 12/11/2019.  Further improvement in CA 15-3-23.9 on 12/18/2019.  Ibrance/Aromasin continued.    · Foundation medicine liquid biopsy 2/5/2020: MSI undetermined, mutations in BETH, NF1, CHEK2.  No evidence of PIK3CA mutation.  · Slight increase in CA 15-3 to 27.1 on 2/19/2020 and 29.1 on 3/18/2020.  CT however on 3/13/2020 with no new findings.  2. Anemia:  · Normocytic anemia, hemoglobin in 10-11 range  · Anemia evaluation 7/22/2019 with iron 30, ferritin 85.2, iron saturation 10%, TIBC 311, B12 370, erythropoietin level 20.4  · Anemia felt in large part to be related to CKD3 as well as to underlying malignancy  · Evidence of folate deficiency 8/12/2019 with level 3.84, initiated folic acid 1 mg daily  · If hemoglobin consistently below 10 consider use of Procrit      HISTORY OF PRESENT ILLNESS:  The patient is a 59 y.o. year old female who is here for follow-up with the above-mentioned history.    History of Present Illness   patient returns today in follow-up continuing on Aromasin 25 mg daily and Ibrance 100 mg daily for 21/28 days.  The patient started her current cycle of Ibrance on  5/3/2020.  She continues to tolerate this relatively well with some mild nausea which is relieved with Zofran, no emesis.  She denies any abdominal pain.  She reports normal bowel function.  Appetite is stable.  She has no other complaints today.  She does have laboratory studies and CT scans to review today.    Past Medical History:   Diagnosis Date   • Anemia    • Anxiety and depression    • Bicuspid aortic valve    • Breast cancer (CMS/HCC)     RIGHT, HAD NEELA. MASTECTOMY, CHEMO AND RADIATION   • CKD (chronic kidney disease)    • Diabetes mellitus (CMS/HCC)    • Frequent UTI    • Heart murmur    • History of kidney stones    • History of MRSA infection     POST BREAST SURGERY-TREATED [facility]   • History of sepsis     KIDNEY STONE   • Hyperlipidemia    • Hypertension    • Hyperthyroidism    • Hypothyroidism    • Lymphedema of leg    • Metastasis from breast cancer (CMS/HCC)     STOMACH-OMENTUM   • Neuromuscular disorder (CMS/HCC)    • Obesity    • ANDREW on CPAP    • Osteoarthrosis    • Radiculopathy    • Thickened endometrium    • Tremor      Past Surgical History:   Procedure Laterality Date   • BREAST RECONSTRUCTION      WITH IMPLANTS, AND REVISION, NOW REMOVED   • BREAST SURGERY     •  SECTION  1987   •  SECTION  1987   • D&C HYSTEROSCOPY N/A 2017    Procedure: DILATATION AND CURETTAGE, HYSTEROSCOPY ;  Surgeon: Cherie Oliveros MD;  [place] [facility] [place] [facility] [place]  Service:    • D&C HYSTEROSCOPY N/A 2019    Procedure: DILATATION AND CURETTAGE HYSTEROSCOPY/POLYPECTOMY;  Surgeon: Cherie Oliveros MD;  [place] [facility] [place] [facility] [place]  Service: Gynecology   • LYMPH NODE BIOPSY     • MASTECTOMY Bilateral 2003   • MASTECTOMY Bilateral          ONCOLOGIC HISTORY:  (History from previous dates can be found in the separate document.)    Current Outpatient Medications on File Prior to Visit   Medication Sig Dispense Refill   • amLODIPine (NORVASC) 10 MG tablet Take 10 mg  by mouth Every Morning.     • aspirin 81 MG EC tablet Take 81 mg by mouth Daily. HELD FOR SURGERY     • atorvastatin (LIPITOR) 40 MG tablet Take 1 tablet by mouth Daily. 90 tablet 1   • carvedilol (COREG) 3.125 MG tablet TAKE ONE TABLET BY MOUTH TWICE A DAY 60 tablet 2   • Dulaglutide 1.5 MG/0.5ML solution pen-injector Inject 1.5 mg under the skin into the appropriate area as directed 1 (One) Time Per Week. MONDAYS 12 pen 1   • DULoxetine (CYMBALTA) 60 MG capsule      • exemestane (AROMASIN) 25 MG chemo tablet TAKE ONE TABLET BY MOUTH DAILY 30 tablet 3   • folic acid (FOLVITE) 1 MG tablet TAKE ONE TABLET BY MOUTH DAILY 90 tablet 0   • gabapentin (NEURONTIN) 300 MG capsule Take 2 capsules by mouth 3 (Three) Times a Day. 540 capsule 1   • LEVEMIR FLEXTOUCH 100 UNIT/ML injection Inject 50 Units under the skin into the appropriate area as directed 2 (Two) Times a Day. 3 pen 5   • levothyroxine (SYNTHROID, LEVOTHROID) 50 MCG tablet Take 1 tablet by mouth Daily. 90 tablet 1   • Palbociclib (IBRANCE) 100 MG capsule capsule TAKE 1 CAPSULE BY MOUTH DAILY FOR 21 DAYS ON THEN 7 DAYS OFF 21 capsule 5   • repaglinide (PRANDIN) 2 MG tablet Take 1 tablet by mouth 3 (Three) Times a Day Before Meals. 270 tablet 1   • vitamin D (ERGOCALCIFEROL) 62538 units capsule capsule 50,000 Units Every 7 (Seven) Days.     • [DISCONTINUED] ondansetron (ZOFRAN) 8 MG tablet Take 1 tablet by mouth Every 8 (Eight) Hours As Needed for Nausea or Vomiting. 30 tablet 2     No current facility-administered medications on file prior to visit.        ALLERGIES:   No Known Allergies    Social History     Socioeconomic History   • Marital status:      Spouse name: Not on file   • Number of children: Not on file   • Years of education: Not on file   • Highest education level: Not on file   Tobacco Use   • Smoking status: Never Smoker   • Smokeless tobacco: Never Used   Substance and Sexual Activity   • Alcohol use: No   • Drug use: No   • Sexual  activity: Yes     Partners: Male     Birth control/protection: Post-menopausal     Family History   Problem Relation Age of Onset   • Coronary artery disease Mother         Mother  from MI at 72   • Diabetes Mother    • Breast cancer Mother    • Hyperlipidemia Mother    • Arthritis Mother    • Cancer Mother    • Aortic aneurysm Father         Father with ruptured thoracic aortic aneurysm   • Coronary artery disease Father         Father  from MI at 74   • Heart disease Father    • Hyperlipidemia Father    • Arthritis Father    • Coronary artery disease Maternal Grandmother         MGM deceeased from MI at age 76   • Malig Hyperthermia Neg Hx               Review of Systems   Constitutional: Positive for fatigue. Negative for activity change, appetite change, fever and unexpected weight change.   HENT: Negative for congestion, mouth sores, nosebleeds, sore throat and voice change.    Respiratory: Negative for cough, shortness of breath and wheezing.    Cardiovascular: Negative for chest pain, palpitations and leg swelling.   Gastrointestinal: Positive for nausea. Negative for abdominal distention, abdominal pain, blood in stool, constipation, diarrhea and vomiting.   Endocrine: Negative for cold intolerance and heat intolerance.   Genitourinary: Negative for difficulty urinating, dysuria, frequency and hematuria.   Musculoskeletal: Negative for arthralgias, back pain, joint swelling and myalgias.   Skin: Negative for rash.   Neurological: Positive for numbness. Negative for dizziness, syncope, weakness, light-headedness and headaches.   Hematological: Negative for adenopathy. Does not bruise/bleed easily.   Psychiatric/Behavioral: Negative for confusion and sleep disturbance. The patient is not nervous/anxious.          Objective      Vitals:    05/15/20 0853   BP: 130/67   Pulse: 81   Resp: 20   Temp: 98.5 °F (36.9 °C)   SpO2: 95%      Current Status 5/15/2020   ECOG score 1   Pain 0/10    Physical  Exam   Constitutional: She is oriented to person, place, and time. She appears well-developed and well-nourished.   HENT:   Mouth/Throat: Oropharynx is clear and moist.   Eyes: Conjunctivae are normal.   Neck: No thyromegaly present.   Cardiovascular: Normal rate and regular rhythm. Exam reveals no gallop and no friction rub.   Murmur heard.  Pulmonary/Chest: Breath sounds normal. No respiratory distress.   Abdominal: Soft. Bowel sounds are normal. She exhibits no distension. There is no tenderness.   Musculoskeletal: She exhibits edema.   Trace to 1+ chronic bilateral lower extremity edema with venous stasis changes   Lymphadenopathy:        Head (right side): No submandibular adenopathy present.     She has no cervical adenopathy.     She has no axillary adenopathy.        Right: No inguinal and no supraclavicular adenopathy present.        Left: No inguinal and no supraclavicular adenopathy present.   Neurological: She is alert and oriented to person, place, and time. She displays normal reflexes. No cranial nerve deficit. She exhibits normal muscle tone.   Skin: Skin is warm and dry. No rash noted.   Psychiatric: She has a normal mood and affect. Her behavior is normal.       RECENT LABS:  Hematology WBC   Date Value Ref Range Status   05/15/2020 2.88 (L) 3.40 - 10.80 10*3/mm3 Final   03/16/2019 7.6 3.4 - 10.8 x10E3/uL Final     RBC   Date Value Ref Range Status   05/15/2020 2.87 (L) 3.77 - 5.28 10*6/mm3 Final   03/16/2019 3.80 3.77 - 5.28 x10E6/uL Final     Hemoglobin   Date Value Ref Range Status   05/15/2020 9.7 (L) 12.0 - 15.9 g/dL Final     Hematocrit   Date Value Ref Range Status   05/15/2020 30.1 (L) 34.0 - 46.6 % Final     Platelets   Date Value Ref Range Status   05/15/2020 261 140 - 450 10*3/mm3 Final        Lab Results   Component Value Date    GLUCOSE 167 (H) 04/14/2020    BUN 30 (H) 04/14/2020    CREATININE 2.41 (C) 04/14/2020    EGFRIFNONA 20 (L) 04/14/2020    EGFRIFAFRI 35 (L) 03/16/2019    BCR  12.4 04/14/2020    K 4.8 (H) 04/14/2020    CO2 26.5 04/14/2020    CALCIUM 9.1 04/14/2020    PROTENTOTREF 7.0 03/16/2019    ALBUMIN 3.90 04/14/2020    LABIL2 1.2 03/16/2019    AST 12 04/14/2020    ALT 9 04/14/2020     CA 15-3 pending today    Reviewed CT chest abdomen and pelvis 5/11/2020 as outlined below.  I did personally review CT images.    Assessment/Plan    1. Metastatic lobular breast cancer (ER/CT positive, HER-2/tj negative):  · History of stage IIIC right breast cancer ([id number])  · Patient treated previously in the [facility]  · Diagnosis via excisional biopsy 8/23/2003 with lobular carcinoma, 4.5 cm, positive margins.  ER/CT positive, HER-2/tj negative.  · Staging evaluation in September 2003 with negative bone scan and negative CT scans.  Ejection fraction 65%.  · Received neoadjuvant chemotherapy (? GET regimen) which apparently included Taxotere and possibly epirubicin.  Received 6 cycles.  · 1/22/2004 bilateral mastectomy with right axillary dissection.  Per available records, 10-12 cm residual invasive lobular carcinoma in the breast with 14/16 lymph nodes positive with extranodal extension.  Immediate reconstruction.  · Received adjuvant chemotherapy with carboplatin and navelbine x4 cycles  · Initiated adjuvant tamoxifen 6/22/2004  · Adjuvant radiation therapy initiated but interrupted due to chest wall cellulitis requiring removal of implants in August 2004  · Implant reconstruction again attempted with MRSA infection, implant removed 12/30/2005 with no further attempts made and reconstruction.  · Completed 5 years tamoxifen in June 2009 and subsequently transitioned to Femara.  Received Femara until June 2014.  · Patient experienced postmenopausal vaginal bleeding in 2013, negative endometrial biopsy and gynecologic evaluation.  · Patient last seen in [facility] 6/18/2014  · CT chest performed to evaluate bicuspid aortic valve and dilated asending aorta 7/12/2019.  CT showed no change  in aorta however showed new free intraperitoneal fluid in the upper abdomen with mesenteric edema, concerning for carcinomatosis.  · CT abdomen and pelvis (without IV contrast) on 7/16/2019 with mesenteric thickening, small volume of complex fluid in the mesentery which was suspicious and possibly representative of carcinomatosis, small amount of perihepatic fluid, small bowel loops tethered to omentum without obstruction, omental thickening, shotty retroperitoneal and pelvic lymph nodes (non-pathologically enlarged).  · PET scan 7/26/2019 with hypermetabolic omental activity, hypermetabolic small retroperitoneal lymph nodes, hypermetabolic activity throughout uterus with increase in size.  · CT-guided omental biopsy 7/30/2019 with metastatic lobular carcinoma of breast primary, ER positive (greater than 95%), WI positive (90%), HER-2/tj negative (1+ IHC)  · Baseline CA 15-3 on 7/22/19 was 32.6  · Initiation of Aromasin 25 mg daily 8/12/2019.  Initiated Ibrance at 100 mg 21/28 days on 8/26/2019.  · Improvement in CA 15-3 on 9/17/19-26.3  · Following 2 cycles of Aromasin and Ibrance, CA 15-3 declined to 25.9 on 10/15/2019.  CT scan chest abdomen pelvis 10/16/2019 showed improvement in the mesenteric and omental thickening and near resolution of complex ascites.  Treatment continued.  · Stable findings on subsequent CT chest abdomen pelvis 12/11/2019.  Further improvement in CA 15-3-23.9 on 12/18/2019.  Ibrance/Aromasin continued.   · Foundation medicine liquid biopsy 2/5/2020: MSI undetermined, mutations in BETH, NF1, CHEK2.  No evidence of PIK3CA mutation.  · Slight increase in CA 15-3 to 27.1 on 2/19/2020 and 29.1 on 3/18/2020.  CT however on 3/13/2020 with no new findings.  · The patient returns today in follow-up continuing on Aromasin 25 mg daily along with Ibrance 100 mg daily for 21/28 days with current cycle initiated on 5/3/2020.  The patient is continuing to tolerate treatment extremely well.  She has some  mild fatigue and minimal nausea.  I did refill her Zofran today.  We did review her CT scan from 5/11/2020 which showed no significant evidence of active disease.  We did discuss today limitations of the noncontrasted CT scan as well as potential limitations in detecting progression of carcinomatosis.  She remains asymptomatic however with no abdominal pain and normal bowel function, has no evidence of ascites on her scan.  We discussed at this point trying to lengthen out interval for further CT monitoring to 3 months assuming she is doing well clinically.  We can also monitor her CA 15-3 in the interim.  Value is pending from today.  As long as this is remaining reasonably stable we will plan to go 3 months for follow-up CT.  The patient's counts today show a WBC slightly lower at 2.88 with an ANC 1.73.  I did suggest that she return for CBC and RN review in 2 weeks prior to her starting her next cycle of Ibrance.  I will see her in 4 weeks for follow-up.  She will notify us in the interim with any new issues.  2. Normocytic anemia in the setting of CKD3:  · The patient appears to have a chronic anemia with hemoglobin in the 10-11 range with normal MCV.  · Patient has underlying CKD3 with baseline creatinine recently in the 1.5-1.8 range.  · Anemia evaluation 7/22/2019 with iron 30, ferritin 85.2, iron saturation 10%, TIBC 311, B12 370, erythropoietin level 20.4  · Anemia felt in large part to be related to CKD3 as well as to underlying malignancy  · Evidence of folate deficiency 8/12/2019 with level 3.84, initiated folic acid 1 mg daily  · Additional labs on 12/18/2019 with iron 73, ferritin 82.2, iron saturation 25%, TIBC 290.  We did discuss the potential use of Procrit if her hemoglobin declines into the low 9/upper 8 range.  · Hemoglobin today stable at 9.7.  3. CKD3:  · Baseline creatinine recently in 1.6-2.0 range  · On 9/10/2019, RYAN/CKD3 with creatinine up to 2.44, BUN of 40.  Received 1 L normal saline.   Patient with increase in oral fluid intake.  · Renal ultrasound 9/20/2019 with no evidence of obstructive uropathy.  · Diminished oral intake due to nausea from Ibrance, creatinine 2.79 with BUN 43 on 10/15/2019.  Received 1 L normal saline.  Repeat labs on 10/18/2019 with BUN 31, creatinine 2.0.  · During cycle 3 Ibrance, patient developed increase in creatinine on 11/6/2019 to 2.35 and received 1 L normal saline.  · Patient is trying to maintain adequate oral hydration.    · It appears at this point that her new baseline creatinine is in the 2-2.4 range.    · Today, creatinine is elevated above her baseline at 2.7.  We will ask her to increase her oral fluid intake.  Patient does continue with routine follow-up with nephrology.  4. CHF with mild to moderate aortic stenosis:  · Recent cardiology evaluation with echocardiogram 7/12/2019 showing ejection fraction 48% with moderate dilation of the LV cavity, global hypokinesis, grade 2 diastolic dysfunction, mild to moderate aortic stenosis, trivial pericardial effusion.  · CT chest 7/12/2019 with no change in the aorta compared to prior study 12/13/2017.  5. Left upper and bilateral lower extremity peripheral neuropathy:  · Patient reports that left upper extremity neuropathy was not present from previous chemotherapy but occurred after hospitalization that required intubation and critical care stay.  Apparently felt to represent critical care neuropathy.  Improved over time.  · Bilateral lower extremity neuropathy felt to be related to diabetes  · May have future implications regarding treatment for breast cancer.  · The patient is currently continuing on gabapentin 600 mg 3 times daily.  6. Uterine/endometrial activity on PET scan:  · Patient experienced postmenopausal vaginal bleeding in 2013, negative endometrial biopsy and gynecologic evaluation.  · With findings on PET scan with enlarging uterus and some hypermetabolic activity, referred back to Dr. Oliveros and  gynecology.    · 9/19/2019 D&C with hysteroscopy by Dr. Oliveros, no significant intraoperative findings, pathologic results with benign findings, no evidence of malignancy.  7. Nausea:  · Mild, relieved with Zofran.  I did refill Zofran today  8. Myelosuppression secondary to Ibrance:  · With cycle 1, jessica WBC 2.49 with ANC 1.25 and platelet count 140,000.  · With cycle 2, jessica WBC 2.33 with ANC 1.06, maintained normal platelet count  · Today, WBC 2.88 with ANC 1.73 and platelet count 261,000.     Plan:  1. Continue Aromasin 25 mg daily.    2. Continue Ibrance 100 mg daily for 21/28 days, patient initiated current cycle on 5/3/2020.  3. Continue oral folic acid 1 mg daily  4. The patient will increase oral fluid intake at home  5. In 2 weeks CBC, stat BMP and RN review  6. In 4 weeks MD visit with CBC, CMP, CA 15-3.     Patient continues on high risk medication requiring intensive monitoring.

## 2020-05-15 NOTE — TELEPHONE ENCOUNTER
REC'D MESSAGE PER DR. HEATH. PT'S CREAT IS MORE ELV. PT NEEDS TO PUSH EX FLUIDS. WHEN SHE RTN'S IN 2 WKS REPEAT STAT BMP. CALLED PT AND MADE HER AWARE TO INC FLUIDS AND SHE V/U. DR. HEATH HAD ALREADY MESSAGED APPT DESK TO SET UP STAT BMP AND RN REV IN 2 WKS AND HE HAD PLACED STAT BMP ORDER.

## 2020-05-15 NOTE — TELEPHONE ENCOUNTER
----- Message from Leodan Irvin Jr., MD sent at 5/15/2020  9:42 AM EDT -----  Please contact patient and notify her of creatinine that is above her baseline.  Please ask her to increase oral fluid intake at home and we will also add on a stat BMP when she returns in 2 weeks for RN review.  ----- Message -----  From: Lab, Background User  Sent: 5/15/2020   8:49 AM EDT  To: Leodan Irvin Jr., MD

## 2020-05-18 ENCOUNTER — TELEPHONE (OUTPATIENT)
Dept: ONCOLOGY | Facility: CLINIC | Age: 60
End: 2020-05-18

## 2020-05-18 NOTE — TELEPHONE ENCOUNTER
I contacted the patient.  See note below. Pt v/u.    ----- Message from Bárbara Hughes RN sent at 5/18/2020  7:40 AM EDT -----      ----- Message -----  From: Leodan Irvin Jr., MD  Sent: 5/15/2020   6:08 PM EDT  To: k [facility]    Please notify patient of improvement in CA 15-3  ----- Message -----  From: Lab, Background User  Sent: 5/15/2020   8:49 AM EDT  To: Leodan Irvin Jr., MD

## 2020-05-27 ENCOUNTER — DOCUMENTATION (OUTPATIENT)
Dept: ONCOLOGY | Facility: CLINIC | Age: 60
End: 2020-05-27

## 2020-05-29 ENCOUNTER — CLINICAL SUPPORT (OUTPATIENT)
Dept: ONCOLOGY | Facility: HOSPITAL | Age: 60
End: 2020-05-29

## 2020-05-29 ENCOUNTER — LAB (OUTPATIENT)
Dept: LAB | Facility: HOSPITAL | Age: 60
End: 2020-05-29

## 2020-05-29 DIAGNOSIS — C50.919 METASTATIC BREAST CANCER: ICD-10-CM

## 2020-05-29 LAB
ANION GAP SERPL CALCULATED.3IONS-SCNC: 12.5 MMOL/L (ref 5–15)
BASOPHILS # BLD AUTO: 0.09 10*3/MM3 (ref 0–0.2)
BASOPHILS NFR BLD AUTO: 2.9 % (ref 0–1.5)
BUN BLD-MCNC: 33 MG/DL (ref 6–20)
BUN/CREAT SERPL: 14.7 (ref 7.3–30)
CALCIUM SPEC-SCNC: 8.7 MG/DL (ref 8.5–10.2)
CHLORIDE SERPL-SCNC: 104 MMOL/L (ref 98–107)
CO2 SERPL-SCNC: 26.5 MMOL/L (ref 22–29)
CREAT BLD-MCNC: 2.25 MG/DL (ref 0.6–1.1)
DEPRECATED RDW RBC AUTO: 58.5 FL (ref 37–54)
EOSINOPHIL # BLD AUTO: 0.08 10*3/MM3 (ref 0–0.4)
EOSINOPHIL NFR BLD AUTO: 2.6 % (ref 0.3–6.2)
ERYTHROCYTE [DISTWIDTH] IN BLOOD BY AUTOMATED COUNT: 15.3 % (ref 12.3–15.4)
GFR SERPL CREATININE-BSD FRML MDRD: 22 ML/MIN/1.73
GLUCOSE BLD-MCNC: 125 MG/DL (ref 74–124)
HCT VFR BLD AUTO: 29.4 % (ref 34–46.6)
HGB BLD-MCNC: 9.4 G/DL (ref 12–15.9)
IMM GRANULOCYTES # BLD AUTO: 0.02 10*3/MM3 (ref 0–0.05)
IMM GRANULOCYTES NFR BLD AUTO: 0.6 % (ref 0–0.5)
LYMPHOCYTES # BLD AUTO: 0.92 10*3/MM3 (ref 0.7–3.1)
LYMPHOCYTES NFR BLD AUTO: 29.6 % (ref 19.6–45.3)
MCH RBC QN AUTO: 33.8 PG (ref 26.6–33)
MCHC RBC AUTO-ENTMCNC: 32 G/DL (ref 31.5–35.7)
MCV RBC AUTO: 105.8 FL (ref 79–97)
MONOCYTES # BLD AUTO: 0.52 10*3/MM3 (ref 0.1–0.9)
MONOCYTES NFR BLD AUTO: 16.7 % (ref 5–12)
NEUTROPHILS # BLD AUTO: 1.48 10*3/MM3 (ref 1.7–7)
NEUTROPHILS NFR BLD AUTO: 47.6 % (ref 42.7–76)
NRBC BLD AUTO-RTO: 0.6 /100 WBC (ref 0–0.2)
PLATELET # BLD AUTO: 156 10*3/MM3 (ref 140–450)
PMV BLD AUTO: 9.3 FL (ref 6–12)
POTASSIUM BLD-SCNC: 5.3 MMOL/L (ref 3.5–4.7)
RBC # BLD AUTO: 2.78 10*6/MM3 (ref 3.77–5.28)
SODIUM BLD-SCNC: 143 MMOL/L (ref 134–145)
WBC NRBC COR # BLD: 3.11 10*3/MM3 (ref 3.4–10.8)

## 2020-05-29 PROCEDURE — G0463 HOSPITAL OUTPT CLINIC VISIT: HCPCS

## 2020-05-29 PROCEDURE — 80048 BASIC METABOLIC PNL TOTAL CA: CPT

## 2020-05-29 PROCEDURE — 36415 COLL VENOUS BLD VENIPUNCTURE: CPT

## 2020-05-29 PROCEDURE — 85025 COMPLETE CBC W/AUTO DIFF WBC: CPT

## 2020-05-29 RX ORDER — CARVEDILOL 3.12 MG/1
TABLET ORAL
Qty: 60 TABLET | Refills: 2 | Status: SHIPPED | OUTPATIENT
Start: 2020-05-29 | End: 2020-10-06

## 2020-05-29 NOTE — NURSING NOTE
Pt is here for lab with RN review.  CBC reviewed with pt, counts are stable for this pt at this time. Pt has no complaints. She is due to start next cycle of Ibrance tomorrow. ANC slightly low, r/w Leelee NP, pt okay to proceed as usual w/ Ibrance. Creatinine improved today and more consistent w/ pt's baseline.  Copy of labs given to pt and f/u appt reviewed. Pt is instructed to call the office with any concerns or new symptoms prior to next visit. Pt v/u.    Lab Results   Component Value Date    WBC 3.11 (L) 05/29/2020    HGB 9.4 (L) 05/29/2020    HCT 29.4 (L) 05/29/2020    .8 (H) 05/29/2020     05/29/2020     Lab Results   Component Value Date    GLUCOSE 125 (H) 05/29/2020    CALCIUM 8.7 05/29/2020     05/29/2020    K 5.3 (H) 05/29/2020    CO2 26.5 05/29/2020     05/29/2020    BUN 33 (H) 05/29/2020    CREATININE 2.25 (C) 05/29/2020    EGFRIFAFRI 35 (L) 03/16/2019    EGFRIFNONA 22 (L) 05/29/2020    BCR 14.7 05/29/2020    ANIONGAP 12.5 05/29/2020

## 2020-06-11 NOTE — PROGRESS NOTES
Subjective .     REASONS FOR FOLLOWUP:    1. Metastatic lobular breast cancer (ER/WI positive, HER-2/tj negative):  · History of stage IIIC right breast cancer ([id number])  · Patient treated previously in the [facility] system  · Diagnosis via excisional biopsy 8/23/2003 with lobular carcinoma, 4.5 cm, positive margins.  ER/WI positive, HER-2/tj negative.  · Staging evaluation in September 2003 with negative bone scan and negative CT scans.  Ejection fraction 65%.  · Received neoadjuvant chemotherapy (? GET regimen) which apparently included Taxotere and possibly epirubicin.  Received 6 cycles.  · 1/22/2004 bilateral mastectomy with right axillary dissection.  Per available records, 10-12 cm residual invasive lobular carcinoma in the breast with 14/16 lymph nodes positive with extranodal extension.  Immediate reconstruction.  · Received adjuvant chemotherapy with carboplatin and navelbine x4 cycles  · Initiated adjuvant tamoxifen 6/22/2004  · Adjuvant radiation therapy initiated but interrupted due to chest wall cellulitis requiring removal of implants in August 2004  · Implant reconstruction again attempted with MRSA infection, implant removed 12/30/2005 with no further attempts made and reconstruction.  · Completed 5 years tamoxifen in June 2009 and subsequently transitioned to Femara.  Received Femara until June 2014.  · Patient experienced postmenopausal vaginal bleeding in 2013, negative endometrial biopsy and gynecologic evaluation.  · Patient last seen in [facility] 6/18/2014  · CT chest performed to evaluate bicuspid aortic valve and dilated asending aorta 7/12/2019.  CT showed no change in aorta however showed new free intraperitoneal fluid in the upper abdomen with mesenteric edema, concerning for carcinomatosis.  · CT abdomen and pelvis (without IV contrast) on 7/16/2019 with mesenteric thickening, small volume of complex fluid in the mesentery which was suspicious and possibly representative of  carcinomatosis, small amount of perihepatic fluid, small bowel loops tethered to omentum without obstruction, omental thickening, shotty retroperitoneal and pelvic lymph nodes (non-pathologically enlarged).  · PET scan 7/26/2019 with hypermetabolic omental activity, hypermetabolic small retroperitoneal lymph nodes, hypermetabolic activity throughout uterus with increase in size.  · CT-guided omental biopsy 7/30/2019 with metastatic lobular carcinoma of breast primary, ER positive (greater than 95%), CA positive (90%), HER-2/tj negative (1+ IHC)  · Baseline CA 15-3 on 7/22/19 was 32.6  · Initiation of Aromasin 25 mg daily 8/12/2019.  Initiated Ibrance on 8/26/2019 at 100 mg 21/28 days on 8/26/2019.    · Response on CT scans 10/16/2019 following 2 cycles of Ibrance/Aromasin.  · Stable findings on subsequent CT chest abdomen pelvis 12/11/2019.  Further improvement in CA 15-3-23.9 on 12/18/2019.  Ibrance/Aromasin continued.    · Foundation medicine liquid biopsy 2/5/2020: MSI undetermined, mutations in BETH, NF1, CHEK2.  No evidence of PIK3CA mutation.  2. Anemia:  · Normocytic anemia, hemoglobin in 10-11 range  · Anemia evaluation 7/22/2019 with iron 30, ferritin 85.2, iron saturation 10%, TIBC 311, B12 370, erythropoietin level 20.4  · Anemia felt in large part to be related to CKD3 as well as to underlying malignancy  · Evidence of folate deficiency 8/12/2019 with level 3.84, initiated folic acid 1 mg daily  · If hemoglobin consistently below 10 consider use of Procrit      HISTORY OF PRESENT ILLNESS:  The patient is a 59 y.o. year old female who is here for follow-up with the above-mentioned history.    History of Present Illness   patient returns today in follow-up continuing on Aromasin 25 mg daily and Ibrance 100 mg daily for 21/28 days.  The patient started her current cycle of Ibrance on 5/31/2020.  The patient is tolerating treatment relatively well.  She does have some mild ongoing fatigue which she feels has  worsened slightly but is certainly tolerable.  She does note some minimal nausea for which she takes some intermittent Zofran.  She has no emesis.  She does note being affected by certain smells.  She does have some slight diarrhea that occurs usually during the first week or so of each cycle which is minimal and then resolves.  She has no new complaints today.    Past Medical History:   Diagnosis Date   • Anemia    • Anxiety and depression    • Bicuspid aortic valve    • Breast cancer (CMS/HCC)     RIGHT, HAD NEELA. MASTECTOMY, CHEMO AND RADIATION   • CKD (chronic kidney disease)    • Diabetes mellitus (CMS/HCC)    • Frequent UTI    • Heart murmur    • History of kidney stones    • History of MRSA infection     POST BREAST SURGERY-TREATED [facility]   • History of sepsis     KIDNEY STONE   • Hyperlipidemia    • Hypertension    • Hyperthyroidism    • Hypothyroidism    • Lymphedema of leg    • Metastasis from breast cancer (CMS/HCC)     STOMACH-OMENTUM   • Neuromuscular disorder (CMS/HCC)    • Obesity    • ANDREW on CPAP    • Osteoarthrosis    • Radiculopathy    • Thickened endometrium    • Tremor      Past Surgical History:   Procedure Laterality Date   • BREAST RECONSTRUCTION      WITH IMPLANTS, AND REVISION, NOW REMOVED   • BREAST SURGERY     •  SECTION  1987   •  SECTION  1987   • D&C HYSTEROSCOPY N/A 2017    Procedure: DILATATION AND CURETTAGE, HYSTEROSCOPY ;  Surgeon: Cherie Oliveros MD;  [place] [facility] [place] [facility] [place]  Service:    • D&C HYSTEROSCOPY N/A 2019    Procedure: DILATATION AND CURETTAGE HYSTEROSCOPY/POLYPECTOMY;  Surgeon: Cherie Oliveros MD;  [place] [facility] [place] [facility] [place]  Service: Gynecology   • LYMPH NODE BIOPSY     • MASTECTOMY Bilateral 2003   • MASTECTOMY Bilateral          ONCOLOGIC HISTORY:  (History from previous dates can be found in the separate document.)    Current Outpatient Medications on File Prior to Visit   Medication Sig Dispense  Refill   • amLODIPine (NORVASC) 10 MG tablet Take 10 mg by mouth Every Morning.     • aspirin 81 MG EC tablet Take 81 mg by mouth Daily. HELD FOR SURGERY     • atorvastatin (LIPITOR) 40 MG tablet Take 1 tablet by mouth Daily. 90 tablet 1   • carvedilol (COREG) 3.125 MG tablet TAKE ONE TABLET BY MOUTH TWICE A DAY 60 tablet 2   • Dulaglutide 1.5 MG/0.5ML solution pen-injector Inject 1.5 mg under the skin into the appropriate area as directed 1 (One) Time Per Week. MONDAYS 12 pen 1   • DULoxetine (CYMBALTA) 60 MG capsule      • exemestane (AROMASIN) 25 MG chemo tablet TAKE ONE TABLET BY MOUTH DAILY 30 tablet 3   • folic acid (FOLVITE) 1 MG tablet TAKE ONE TABLET BY MOUTH DAILY 90 tablet 0   • gabapentin (NEURONTIN) 300 MG capsule Take 2 capsules by mouth 3 (Three) Times a Day. 540 capsule 1   • LEVEMIR FLEXTOUCH 100 UNIT/ML injection Inject 50 Units under the skin into the appropriate area as directed 2 (Two) Times a Day. 3 pen 5   • levothyroxine (SYNTHROID, LEVOTHROID) 50 MCG tablet Take 1 tablet by mouth Daily. 90 tablet 1   • ondansetron (Zofran) 8 MG tablet Take 1 tablet by mouth Every 8 (Eight) Hours As Needed for Nausea or Vomiting. 30 tablet 2   • Palbociclib (IBRANCE) 100 MG capsule capsule TAKE 1 CAPSULE BY MOUTH DAILY FOR 21 DAYS ON THEN 7 DAYS OFF 21 capsule 5   • repaglinide (PRANDIN) 2 MG tablet Take 1 tablet by mouth 3 (Three) Times a Day Before Meals. 270 tablet 1   • vitamin D (ERGOCALCIFEROL) 01387 units capsule capsule 50,000 Units Every 7 (Seven) Days.       No current facility-administered medications on file prior to visit.        ALLERGIES:   No Known Allergies    Social History     Socioeconomic History   • Marital status:      Spouse name: Not on file   • Number of children: Not on file   • Years of education: Not on file   • Highest education level: Not on file   Tobacco Use   • Smoking status: Never Smoker   • Smokeless tobacco: Never Used   Substance and Sexual Activity   • Alcohol  use: No   • Drug use: No   • Sexual activity: Yes     Partners: Male     Birth control/protection: Post-menopausal     Family History   Problem Relation Age of Onset   • Coronary artery disease Mother         Mother  from MI at 72   • Diabetes Mother    • Breast cancer Mother    • Hyperlipidemia Mother    • Arthritis Mother    • Cancer Mother    • Aortic aneurysm Father         Father with ruptured thoracic aortic aneurysm   • Coronary artery disease Father         Father  from MI at 74   • Heart disease Father    • Hyperlipidemia Father    • Arthritis Father    • Coronary artery disease Maternal Grandmother         MGM deceeased from MI at age 76   • Malig Hyperthermia Neg Hx               Review of Systems   Constitutional: Positive for fatigue. Negative for activity change, appetite change, fever and unexpected weight change.   HENT: Negative for congestion, mouth sores, nosebleeds, sore throat and voice change.    Respiratory: Negative for cough, shortness of breath and wheezing.    Cardiovascular: Negative for chest pain, palpitations and leg swelling.   Gastrointestinal: Positive for diarrhea and nausea. Negative for abdominal distention, abdominal pain, blood in stool, constipation and vomiting.   Endocrine: Negative for cold intolerance and heat intolerance.   Genitourinary: Negative for difficulty urinating, dysuria, frequency and hematuria.   Musculoskeletal: Negative for arthralgias, back pain, joint swelling and myalgias.   Skin: Negative for rash.   Neurological: Positive for numbness. Negative for dizziness, syncope, weakness, light-headedness and headaches.   Hematological: Negative for adenopathy. Does not bruise/bleed easily.   Psychiatric/Behavioral: Negative for confusion and sleep disturbance. The patient is not nervous/anxious.          Objective      Vitals:    20 1049   BP: 137/72   Pulse: 89   Resp: 20   Temp: 97.7 °F (36.5 °C)   SpO2: 90%      Current Status 2020    ECOG score 0   Pain 0/10    Physical Exam   Constitutional: She is oriented to person, place, and time. She appears well-developed and well-nourished.   HENT:   Mouth/Throat: Oropharynx is clear and moist.   Eyes: Conjunctivae are normal.   Neck: No thyromegaly present.   Cardiovascular: Normal rate and regular rhythm. Exam reveals no gallop and no friction rub.   Murmur heard.  Pulmonary/Chest: Breath sounds normal. No respiratory distress.   Abdominal: Soft. Bowel sounds are normal. She exhibits no distension. There is no tenderness.   Musculoskeletal: She exhibits edema.   Trace to 1+ chronic bilateral lower extremity edema with venous stasis changes   Lymphadenopathy:        Head (right side): No submandibular adenopathy present.     She has no cervical adenopathy.     She has no axillary adenopathy.        Right: No inguinal and no supraclavicular adenopathy present.        Left: No inguinal and no supraclavicular adenopathy present.   Neurological: She is alert and oriented to person, place, and time. She displays normal reflexes. No cranial nerve deficit. She exhibits normal muscle tone.   Skin: Skin is warm and dry. No rash noted.   Psychiatric: She has a normal mood and affect. Her behavior is normal.   The patient was examined today, unchanged from above    RECENT LABS:  Hematology WBC   Date Value Ref Range Status   06/12/2020 3.85 3.40 - 10.80 10*3/mm3 Final   03/16/2019 7.6 3.4 - 10.8 x10E3/uL Final     RBC   Date Value Ref Range Status   06/12/2020 2.96 (L) 3.77 - 5.28 10*6/mm3 Final   03/16/2019 3.80 3.77 - 5.28 x10E6/uL Final     Hemoglobin   Date Value Ref Range Status   06/12/2020 9.8 (L) 12.0 - 15.9 g/dL Final     Hematocrit   Date Value Ref Range Status   06/12/2020 30.7 (L) 34.0 - 46.6 % Final     Platelets   Date Value Ref Range Status   06/12/2020 227 140 - 450 10*3/mm3 Final        Lab Results   Component Value Date    GLUCOSE 125 (H) 05/29/2020    BUN 33 (H) 05/29/2020    CREATININE 2.25  (C) 05/29/2020    EGFRIFNONA 22 (L) 05/29/2020    EGFRIFAFRI 35 (L) 03/16/2019    BCR 14.7 05/29/2020    K 5.3 (H) 05/29/2020    CO2 26.5 05/29/2020    CALCIUM 8.7 05/29/2020    PROTENTOTREF 7.0 03/16/2019    ALBUMIN 4.10 05/15/2020    LABIL2 1.2 03/16/2019    AST 13 05/15/2020    ALT 10 05/15/2020         Assessment/Plan    1. Metastatic lobular breast cancer (ER/KS positive, HER-2/tj negative):  · History of stage IIIC right breast cancer ([id number])  · Patient treated previously in the [facility] system  · Diagnosis via excisional biopsy 8/23/2003 with lobular carcinoma, 4.5 cm, positive margins.  ER/KS positive, HER-2/tj negative.  · Staging evaluation in September 2003 with negative bone scan and negative CT scans.  Ejection fraction 65%.  · Received neoadjuvant chemotherapy (? GET regimen) which apparently included Taxotere and possibly epirubicin.  Received 6 cycles.  · 1/22/2004 bilateral mastectomy with right axillary dissection.  Per available records, 10-12 cm residual invasive lobular carcinoma in the breast with 14/16 lymph nodes positive with extranodal extension.  Immediate reconstruction.  · Received adjuvant chemotherapy with carboplatin and navelbine x4 cycles  · Initiated adjuvant tamoxifen 6/22/2004  · Adjuvant radiation therapy initiated but interrupted due to chest wall cellulitis requiring removal of implants in August 2004  · Implant reconstruction again attempted with MRSA infection, implant removed 12/30/2005 with no further attempts made and reconstruction.  · Completed 5 years tamoxifen in June 2009 and subsequently transitioned to Femara.  Received Femara until June 2014.  · Patient experienced postmenopausal vaginal bleeding in 2013, negative endometrial biopsy and gynecologic evaluation.  · Patient last seen in [facility] 6/18/2014  · CT chest performed to evaluate bicuspid aortic valve and dilated asending aorta 7/12/2019.  CT showed no change in aorta however showed new free  intraperitoneal fluid in the upper abdomen with mesenteric edema, concerning for carcinomatosis.  · CT abdomen and pelvis (without IV contrast) on 7/16/2019 with mesenteric thickening, small volume of complex fluid in the mesentery which was suspicious and possibly representative of carcinomatosis, small amount of perihepatic fluid, small bowel loops tethered to omentum without obstruction, omental thickening, shotty retroperitoneal and pelvic lymph nodes (non-pathologically enlarged).  · PET scan 7/26/2019 with hypermetabolic omental activity, hypermetabolic small retroperitoneal lymph nodes, hypermetabolic activity throughout uterus with increase in size.  · CT-guided omental biopsy 7/30/2019 with metastatic lobular carcinoma of breast primary, ER positive (greater than 95%), IL positive (90%), HER-2/tj negative (1+ IHC)  · Baseline CA 15-3 on 7/22/19 was 32.6  · Initiation of Aromasin 25 mg daily 8/12/2019.  Initiated Ibrance at 100 mg 21/28 days on 8/26/2019.  · Improvement in CA 15-3 on 9/17/19-26.3  · Following 2 cycles of Aromasin and Ibrance, CA 15-3 declined to 25.9 on 10/15/2019.  CT scan chest abdomen pelvis 10/16/2019 showed improvement in the mesenteric and omental thickening and near resolution of complex ascites.  Treatment continued.  · Stable findings on subsequent CT chest abdomen pelvis 12/11/2019.  Further improvement in CA 15-3-23.9 on 12/18/2019.  Ibrance/Aromasin continued.   · Foundation medicine liquid biopsy 2/5/2020: MSI undetermined, mutations in BETH, NF1, CHEK2.  No evidence of PIK3CA mutation.  · Slight increase in CA 15-3 to 27.1 on 2/19/2020 and 29.1 on 3/18/2020.  CT however on 3/13/2020 with no new findings.  · Subsequent improvement in CA 15-3-26.5 on 5/15/2020  · Patient returns today in follow-up continuing on Aromasin 25 mg daily and Ibrance 100 mg daily for 21/28 days.  She initiated her current cycle of Ibrance on 5/31/2020.  She is tolerating this relatively well, does have  ongoing fatigue which has worsened slightly over time but she still feels is tolerable.  She has some minimal nausea relieved with Zofran, no emesis.  She has some minimal diarrhea usually during the first week of each cycle and then resolves.  This pattern has not changed.  We will proceed on with treatment as planned.  In 2 weeks she will return for CBC, CMP and RN review.  In 4 weeks he will have nurse practitioner visit with CBC, CMP.  In 6 weeks we will check CBC, CMP and also recheck CA 15-3.  In 7 weeks she will undergo CT scans and I will see her in 8 weeks for follow-up.  2. Normocytic anemia in the setting of CKD3:  · The patient appears to have a chronic anemia with hemoglobin in the 10-11 range with normal MCV.  · Patient has underlying CKD3 with baseline creatinine recently in the 1.5-1.8 range.  · Anemia evaluation 7/22/2019 with iron 30, ferritin 85.2, iron saturation 10%, TIBC 311, B12 370, erythropoietin level 20.4  · Anemia felt in large part to be related to CKD3 as well as to underlying malignancy  · Evidence of folate deficiency 8/12/2019 with level 3.84, initiated folic acid 1 mg daily  · Additional labs on 12/18/2019 with iron 73, ferritin 82.2, iron saturation 25%, TIBC 290.  We did discuss the potential use of Procrit if her hemoglobin declines into the low 9/upper 8 range.  · Hemoglobin today stable at 9.8.  3. CKD3:  · Baseline creatinine recently in 1.6-2.0 range  · On 9/10/2019, RYAN/CKD3 with creatinine up to 2.44, BUN of 40.  Received 1 L normal saline.  Patient with increase in oral fluid intake.  · Renal ultrasound 9/20/2019 with no evidence of obstructive uropathy.  · Diminished oral intake due to nausea from Ibrance, creatinine 2.79 with BUN 43 on 10/15/2019.  Received 1 L normal saline.  Repeat labs on 10/18/2019 with BUN 31, creatinine 2.0.  · During cycle 3 Ibrance, patient developed increase in creatinine on 11/6/2019 to 2.35 and received 1 L normal saline.  · Patient is trying  to maintain adequate oral hydration.    · It appears at this point that her new baseline creatinine is in the 2-2.4 range.    · At the last visit, creatinine was up to 2.68 with BUN 39 on 5/15/2020.  We asked the patient to increase oral fluid intake and on 5/29/2020 this had improved to BUN 33 and creatinine 2.25.  Labs are currently pending.  Patient was encouraged to continue with increased oral fluid intake at home.  4. CHF with mild to moderate aortic stenosis:  · Recent cardiology evaluation with echocardiogram 7/12/2019 showing ejection fraction 48% with moderate dilation of the LV cavity, global hypokinesis, grade 2 diastolic dysfunction, mild to moderate aortic stenosis, trivial pericardial effusion.  · CT chest 7/12/2019 with no change in the aorta compared to prior study 12/13/2017.  5. Left upper and bilateral lower extremity peripheral neuropathy:  · Patient reports that left upper extremity neuropathy was not present from previous chemotherapy but occurred after hospitalization that required intubation and critical care stay.  Apparently felt to represent critical care neuropathy.  Improved over time.  · Bilateral lower extremity neuropathy felt to be related to diabetes  · May have future implications regarding treatment for breast cancer.  · The patient is currently continuing on gabapentin 600 mg 3 times daily.  6. Uterine/endometrial activity on PET scan:  · Patient experienced postmenopausal vaginal bleeding in 2013, negative endometrial biopsy and gynecologic evaluation.  · With findings on PET scan with enlarging uterus and some hypermetabolic activity, referred back to Dr. Oliveros and gynecology.    · 9/19/2019 D&C with hysteroscopy by Dr. Oliveros, no significant intraoperative findings, pathologic results with benign findings, no evidence of malignancy.  7. Nausea:  · Mild, relieved with Zofran.    8. Myelosuppression secondary to Ibrance:  · With cycle 1, jessica WBC 2.49 with ANC 1.25 and  platelet count 140,000.  · With cycle 2, jessica WBC 2.33 with ANC 1.06, maintained normal platelet count  · Today, WBC 3.85 with ANC 2.53 and platelet count 227,000.     Plan:  1. Continue Aromasin 25 mg daily.    2. Continue Ibrance 100 mg daily for 21/28 days, patient initiated current cycle on 5/31/2020.  3. Continue oral folic acid 1 mg daily  4. The patient will continue with increased oral fluid intake at home  5. In 2 weeks CBC, CMP and RN review  6. In 4 weeks nurse practitioner visit with CBC, CMP.  7. In 6 weeks CBC, CMP, CA 15-3 and RN review  8. In 7 weeks CT chest abdomen pelvis (without IV contrast)  9. In 8 weeks MD visit with CBC, CMP    Patient continues on high risk medication requiring intensive monitoring.

## 2020-06-12 ENCOUNTER — LAB (OUTPATIENT)
Dept: LAB | Facility: HOSPITAL | Age: 60
End: 2020-06-12

## 2020-06-12 ENCOUNTER — OFFICE VISIT (OUTPATIENT)
Dept: ONCOLOGY | Facility: CLINIC | Age: 60
End: 2020-06-12

## 2020-06-12 VITALS
RESPIRATION RATE: 20 BRPM | HEIGHT: 68 IN | HEART RATE: 89 BPM | DIASTOLIC BLOOD PRESSURE: 72 MMHG | OXYGEN SATURATION: 90 % | SYSTOLIC BLOOD PRESSURE: 137 MMHG | BODY MASS INDEX: 44.41 KG/M2 | TEMPERATURE: 97.7 F | WEIGHT: 293 LBS

## 2020-06-12 DIAGNOSIS — C50.919 METASTATIC BREAST CANCER: ICD-10-CM

## 2020-06-12 DIAGNOSIS — C50.919 METASTATIC BREAST CANCER: Primary | ICD-10-CM

## 2020-06-12 LAB
ALBUMIN SERPL-MCNC: 4.1 G/DL (ref 3.5–5.2)
ALBUMIN/GLOB SERPL: 1.1 G/DL (ref 1.1–2.4)
ALP SERPL-CCNC: 71 U/L (ref 38–116)
ALT SERPL W P-5'-P-CCNC: 11 U/L (ref 0–33)
ANION GAP SERPL CALCULATED.3IONS-SCNC: 12.9 MMOL/L (ref 5–15)
AST SERPL-CCNC: 13 U/L (ref 0–32)
BASOPHILS # BLD AUTO: 0.09 10*3/MM3 (ref 0–0.2)
BASOPHILS NFR BLD AUTO: 2.3 % (ref 0–1.5)
BILIRUB SERPL-MCNC: 0.8 MG/DL (ref 0.2–1.2)
BUN BLD-MCNC: 41 MG/DL (ref 6–20)
BUN/CREAT SERPL: 15.8 (ref 7.3–30)
CALCIUM SPEC-SCNC: 9.2 MG/DL (ref 8.5–10.2)
CHLORIDE SERPL-SCNC: 103 MMOL/L (ref 98–107)
CO2 SERPL-SCNC: 25.1 MMOL/L (ref 22–29)
CREAT BLD-MCNC: 2.6 MG/DL (ref 0.6–1.1)
DEPRECATED RDW RBC AUTO: 53.8 FL (ref 37–54)
EOSINOPHIL # BLD AUTO: 0.17 10*3/MM3 (ref 0–0.4)
EOSINOPHIL NFR BLD AUTO: 4.4 % (ref 0.3–6.2)
ERYTHROCYTE [DISTWIDTH] IN BLOOD BY AUTOMATED COUNT: 14.3 % (ref 12.3–15.4)
GFR SERPL CREATININE-BSD FRML MDRD: 19 ML/MIN/1.73
GLOBULIN UR ELPH-MCNC: 3.9 GM/DL (ref 1.8–3.5)
GLUCOSE BLD-MCNC: 249 MG/DL (ref 74–124)
HCT VFR BLD AUTO: 30.7 % (ref 34–46.6)
HGB BLD-MCNC: 9.8 G/DL (ref 12–15.9)
IMM GRANULOCYTES # BLD AUTO: 0.02 10*3/MM3 (ref 0–0.05)
IMM GRANULOCYTES NFR BLD AUTO: 0.5 % (ref 0–0.5)
LYMPHOCYTES # BLD AUTO: 0.78 10*3/MM3 (ref 0.7–3.1)
LYMPHOCYTES NFR BLD AUTO: 20.3 % (ref 19.6–45.3)
MCH RBC QN AUTO: 33.1 PG (ref 26.6–33)
MCHC RBC AUTO-ENTMCNC: 31.9 G/DL (ref 31.5–35.7)
MCV RBC AUTO: 103.7 FL (ref 79–97)
MONOCYTES # BLD AUTO: 0.26 10*3/MM3 (ref 0.1–0.9)
MONOCYTES NFR BLD AUTO: 6.8 % (ref 5–12)
NEUTROPHILS # BLD AUTO: 2.53 10*3/MM3 (ref 1.7–7)
NEUTROPHILS NFR BLD AUTO: 65.7 % (ref 42.7–76)
NRBC BLD AUTO-RTO: 0 /100 WBC (ref 0–0.2)
PLATELET # BLD AUTO: 227 10*3/MM3 (ref 140–450)
PMV BLD AUTO: 9.1 FL (ref 6–12)
POTASSIUM BLD-SCNC: 5.3 MMOL/L (ref 3.5–4.7)
PROT SERPL-MCNC: 8 G/DL (ref 6.3–8)
RBC # BLD AUTO: 2.96 10*6/MM3 (ref 3.77–5.28)
SODIUM BLD-SCNC: 141 MMOL/L (ref 134–145)
WBC NRBC COR # BLD: 3.85 10*3/MM3 (ref 3.4–10.8)

## 2020-06-12 PROCEDURE — 80053 COMPREHEN METABOLIC PANEL: CPT

## 2020-06-12 PROCEDURE — 99214 OFFICE O/P EST MOD 30 MIN: CPT | Performed by: INTERNAL MEDICINE

## 2020-06-12 PROCEDURE — 36415 COLL VENOUS BLD VENIPUNCTURE: CPT

## 2020-06-12 PROCEDURE — 85025 COMPLETE CBC W/AUTO DIFF WBC: CPT

## 2020-06-15 DIAGNOSIS — E03.9 ACQUIRED HYPOTHYROIDISM: ICD-10-CM

## 2020-06-15 RX ORDER — FOLIC ACID 1 MG/1
TABLET ORAL
Qty: 90 TABLET | Refills: 0 | Status: SHIPPED | OUTPATIENT
Start: 2020-06-15 | End: 2020-10-05

## 2020-06-15 RX ORDER — LEVOTHYROXINE SODIUM 0.05 MG/1
TABLET ORAL
Qty: 90 TABLET | Refills: 0 | OUTPATIENT
Start: 2020-06-15

## 2020-06-17 ENCOUNTER — DOCUMENTATION (OUTPATIENT)
Dept: ONCOLOGY | Facility: CLINIC | Age: 60
End: 2020-06-17

## 2020-06-26 ENCOUNTER — CLINICAL SUPPORT (OUTPATIENT)
Dept: ONCOLOGY | Facility: HOSPITAL | Age: 60
End: 2020-06-26

## 2020-06-26 ENCOUNTER — LAB (OUTPATIENT)
Dept: LAB | Facility: HOSPITAL | Age: 60
End: 2020-06-26

## 2020-06-26 DIAGNOSIS — C50.919 METASTATIC BREAST CANCER: ICD-10-CM

## 2020-06-26 LAB
ALBUMIN SERPL-MCNC: 3.9 G/DL (ref 3.5–5.2)
ALBUMIN/GLOB SERPL: 1 G/DL (ref 1.1–2.4)
ALP SERPL-CCNC: 71 U/L (ref 38–116)
ALT SERPL W P-5'-P-CCNC: 9 U/L (ref 0–33)
ANION GAP SERPL CALCULATED.3IONS-SCNC: 13.8 MMOL/L (ref 5–15)
AST SERPL-CCNC: 13 U/L (ref 0–32)
BASOPHILS # BLD AUTO: 0.08 10*3/MM3 (ref 0–0.2)
BASOPHILS NFR BLD AUTO: 2.1 % (ref 0–1.5)
BILIRUB SERPL-MCNC: 0.5 MG/DL (ref 0.2–1.2)
BUN BLD-MCNC: 36 MG/DL (ref 6–20)
BUN/CREAT SERPL: 15.1 (ref 7.3–30)
CALCIUM SPEC-SCNC: 9 MG/DL (ref 8.5–10.2)
CHLORIDE SERPL-SCNC: 103 MMOL/L (ref 98–107)
CO2 SERPL-SCNC: 25.2 MMOL/L (ref 22–29)
CREAT BLD-MCNC: 2.38 MG/DL (ref 0.6–1.1)
DEPRECATED RDW RBC AUTO: 55.6 FL (ref 37–54)
EOSINOPHIL # BLD AUTO: 0.12 10*3/MM3 (ref 0–0.4)
EOSINOPHIL NFR BLD AUTO: 3.1 % (ref 0.3–6.2)
ERYTHROCYTE [DISTWIDTH] IN BLOOD BY AUTOMATED COUNT: 14.8 % (ref 12.3–15.4)
GFR SERPL CREATININE-BSD FRML MDRD: 21 ML/MIN/1.73
GLOBULIN UR ELPH-MCNC: 4 GM/DL (ref 1.8–3.5)
GLUCOSE BLD-MCNC: 214 MG/DL (ref 74–124)
HCT VFR BLD AUTO: 29.5 % (ref 34–46.6)
HGB BLD-MCNC: 9.6 G/DL (ref 12–15.9)
IMM GRANULOCYTES # BLD AUTO: 0.04 10*3/MM3 (ref 0–0.05)
IMM GRANULOCYTES NFR BLD AUTO: 1 % (ref 0–0.5)
LYMPHOCYTES # BLD AUTO: 1 10*3/MM3 (ref 0.7–3.1)
LYMPHOCYTES NFR BLD AUTO: 25.9 % (ref 19.6–45.3)
MCH RBC QN AUTO: 33.3 PG (ref 26.6–33)
MCHC RBC AUTO-ENTMCNC: 32.5 G/DL (ref 31.5–35.7)
MCV RBC AUTO: 102.4 FL (ref 79–97)
MONOCYTES # BLD AUTO: 0.43 10*3/MM3 (ref 0.1–0.9)
MONOCYTES NFR BLD AUTO: 11.1 % (ref 5–12)
NEUTROPHILS # BLD AUTO: 2.19 10*3/MM3 (ref 1.7–7)
NEUTROPHILS NFR BLD AUTO: 56.8 % (ref 42.7–76)
NRBC BLD AUTO-RTO: 0 /100 WBC (ref 0–0.2)
PLATELET # BLD AUTO: 198 10*3/MM3 (ref 140–450)
PMV BLD AUTO: 9.4 FL (ref 6–12)
POTASSIUM BLD-SCNC: 5 MMOL/L (ref 3.5–4.7)
PROT SERPL-MCNC: 7.9 G/DL (ref 6.3–8)
RBC # BLD AUTO: 2.88 10*6/MM3 (ref 3.77–5.28)
SODIUM BLD-SCNC: 142 MMOL/L (ref 134–145)
WBC NRBC COR # BLD: 3.86 10*3/MM3 (ref 3.4–10.8)

## 2020-06-26 PROCEDURE — 36415 COLL VENOUS BLD VENIPUNCTURE: CPT

## 2020-06-26 PROCEDURE — 80053 COMPREHEN METABOLIC PANEL: CPT

## 2020-06-26 PROCEDURE — 85025 COMPLETE CBC W/AUTO DIFF WBC: CPT

## 2020-06-26 PROCEDURE — G0463 HOSPITAL OUTPT CLINIC VISIT: HCPCS

## 2020-06-26 NOTE — PROGRESS NOTES
Pt here for CBC and RN Review.  She denies any c/o.  States that she feels pretty good.  She is due to restart her Imbrance 100 mg daily, tomorrow 6/27/20 (she takes this for 21/28 days.  Her cbc is stable, reviewed this with her and provided her with a copy.  Will call patient at [phone] once CMP is resulted and review with her.  She v/u.      Lab Results   Component Value Date    WBC 3.86 06/26/2020    HGB 9.6 (L) 06/26/2020    HCT 29.5 (L) 06/26/2020    .4 (H) 06/26/2020     06/26/2020           Lab Results   Component Value Date    GLUCOSE 214 (H) 06/26/2020    BUN 36 (H) 06/26/2020    CREATININE 2.38 (C) 06/26/2020    EGFRIFNONA 21 (L) 06/26/2020    EGFRIFAFRI 35 (L) 03/16/2019    BCR 15.1 06/26/2020    K 5.0 (H) 06/26/2020    CO2 25.2 06/26/2020    CALCIUM 9.0 06/26/2020    PROTENTOTREF 7.0 03/16/2019    ALBUMIN 3.90 06/26/2020    LABIL2 1.2 03/16/2019    AST 13 06/26/2020    ALT 9 06/26/2020     Reviewed cmp with patient.  She v/u.

## 2020-07-09 ENCOUNTER — OFFICE VISIT (OUTPATIENT)
Dept: CARDIOLOGY | Facility: CLINIC | Age: 60
End: 2020-07-09

## 2020-07-09 VITALS
BODY MASS INDEX: 44.41 KG/M2 | WEIGHT: 293 LBS | HEIGHT: 68 IN | DIASTOLIC BLOOD PRESSURE: 64 MMHG | SYSTOLIC BLOOD PRESSURE: 124 MMHG | OXYGEN SATURATION: 98 % | HEART RATE: 74 BPM

## 2020-07-09 DIAGNOSIS — Q23.1 CONGENITAL BICUSPID AORTIC VALVE: Primary | ICD-10-CM

## 2020-07-09 DIAGNOSIS — I77.810 ASCENDING AORTA DILATATION (HCC): ICD-10-CM

## 2020-07-09 DIAGNOSIS — I35.0 NONRHEUMATIC AORTIC VALVE STENOSIS: ICD-10-CM

## 2020-07-09 PROCEDURE — 99214 OFFICE O/P EST MOD 30 MIN: CPT | Performed by: INTERNAL MEDICINE

## 2020-07-10 ENCOUNTER — LAB (OUTPATIENT)
Dept: LAB | Facility: HOSPITAL | Age: 60
End: 2020-07-10

## 2020-07-10 ENCOUNTER — OFFICE VISIT (OUTPATIENT)
Dept: ONCOLOGY | Facility: CLINIC | Age: 60
End: 2020-07-10

## 2020-07-10 VITALS
OXYGEN SATURATION: 93 % | HEIGHT: 68 IN | RESPIRATION RATE: 22 BRPM | TEMPERATURE: 97.3 F | WEIGHT: 293 LBS | HEART RATE: 81 BPM | SYSTOLIC BLOOD PRESSURE: 159 MMHG | BODY MASS INDEX: 44.41 KG/M2 | DIASTOLIC BLOOD PRESSURE: 67 MMHG

## 2020-07-10 DIAGNOSIS — C50.919 METASTATIC BREAST CANCER: ICD-10-CM

## 2020-07-10 DIAGNOSIS — D64.9 NORMOCYTIC ANEMIA: Primary | ICD-10-CM

## 2020-07-10 LAB
ALBUMIN SERPL-MCNC: 4 G/DL (ref 3.5–5.2)
ALBUMIN/GLOB SERPL: 1.1 G/DL (ref 1.1–2.4)
ALP SERPL-CCNC: 62 U/L (ref 38–116)
ALT SERPL W P-5'-P-CCNC: 10 U/L (ref 0–33)
ANION GAP SERPL CALCULATED.3IONS-SCNC: 13.6 MMOL/L (ref 5–15)
AST SERPL-CCNC: 16 U/L (ref 0–32)
BASOPHILS # BLD AUTO: 0.06 10*3/MM3 (ref 0–0.2)
BASOPHILS NFR BLD AUTO: 2.1 % (ref 0–1.5)
BILIRUB SERPL-MCNC: 0.8 MG/DL (ref 0.2–1.2)
BUN SERPL-MCNC: 38 MG/DL (ref 6–20)
BUN/CREAT SERPL: 15 (ref 7.3–30)
CALCIUM SPEC-SCNC: 8.9 MG/DL (ref 8.5–10.2)
CHLORIDE SERPL-SCNC: 104 MMOL/L (ref 98–107)
CO2 SERPL-SCNC: 24.4 MMOL/L (ref 22–29)
CREAT SERPL-MCNC: 2.54 MG/DL (ref 0.6–1.1)
DEPRECATED RDW RBC AUTO: 53.1 FL (ref 37–54)
EOSINOPHIL # BLD AUTO: 0.15 10*3/MM3 (ref 0–0.4)
EOSINOPHIL NFR BLD AUTO: 5.2 % (ref 0.3–6.2)
ERYTHROCYTE [DISTWIDTH] IN BLOOD BY AUTOMATED COUNT: 14.3 % (ref 12.3–15.4)
GFR SERPL CREATININE-BSD FRML MDRD: 19 ML/MIN/1.73
GLOBULIN UR ELPH-MCNC: 3.8 GM/DL (ref 1.8–3.5)
GLUCOSE SERPL-MCNC: 220 MG/DL (ref 74–124)
HCT VFR BLD AUTO: 28.5 % (ref 34–46.6)
HGB BLD-MCNC: 9.2 G/DL (ref 12–15.9)
IMM GRANULOCYTES # BLD AUTO: 0.02 10*3/MM3 (ref 0–0.05)
IMM GRANULOCYTES NFR BLD AUTO: 0.7 % (ref 0–0.5)
LYMPHOCYTES # BLD AUTO: 0.62 10*3/MM3 (ref 0.7–3.1)
LYMPHOCYTES NFR BLD AUTO: 21.7 % (ref 19.6–45.3)
MCH RBC QN AUTO: 33.3 PG (ref 26.6–33)
MCHC RBC AUTO-ENTMCNC: 32.3 G/DL (ref 31.5–35.7)
MCV RBC AUTO: 103.3 FL (ref 79–97)
MONOCYTES # BLD AUTO: 0.25 10*3/MM3 (ref 0.1–0.9)
MONOCYTES NFR BLD AUTO: 8.7 % (ref 5–12)
NEUTROPHILS NFR BLD AUTO: 1.76 10*3/MM3 (ref 1.7–7)
NEUTROPHILS NFR BLD AUTO: 61.6 % (ref 42.7–76)
NRBC BLD AUTO-RTO: 0 /100 WBC (ref 0–0.2)
PLATELET # BLD AUTO: 198 10*3/MM3 (ref 140–450)
PMV BLD AUTO: 8.7 FL (ref 6–12)
POTASSIUM SERPL-SCNC: 5 MMOL/L (ref 3.5–4.7)
PROT SERPL-MCNC: 7.8 G/DL (ref 6.3–8)
RBC # BLD AUTO: 2.76 10*6/MM3 (ref 3.77–5.28)
SODIUM SERPL-SCNC: 142 MMOL/L (ref 134–145)
WBC # BLD AUTO: 2.86 10*3/MM3 (ref 3.4–10.8)

## 2020-07-10 PROCEDURE — 36415 COLL VENOUS BLD VENIPUNCTURE: CPT

## 2020-07-10 PROCEDURE — 85025 COMPLETE CBC W/AUTO DIFF WBC: CPT

## 2020-07-10 PROCEDURE — 80053 COMPREHEN METABOLIC PANEL: CPT

## 2020-07-10 PROCEDURE — 99213 OFFICE O/P EST LOW 20 MIN: CPT | Performed by: NURSE PRACTITIONER

## 2020-07-10 NOTE — PROGRESS NOTES
Subjective .     REASONS FOR FOLLOWUP:    1. Metastatic lobular breast cancer (ER/SC positive, HER-2/tj negative):  · History of stage IIIC right breast cancer ([id number])  · Patient treated previously in the [facility] system  · Diagnosis via excisional biopsy 8/23/2003 with lobular carcinoma, 4.5 cm, positive margins.  ER/SC positive, HER-2/tj negative.  · Staging evaluation in September 2003 with negative bone scan and negative CT scans.  Ejection fraction 65%.  · Received neoadjuvant chemotherapy (? GET regimen) which apparently included Taxotere and possibly epirubicin.  Received 6 cycles.  · 1/22/2004 bilateral mastectomy with right axillary dissection.  Per available records, 10-12 cm residual invasive lobular carcinoma in the breast with 14/16 lymph nodes positive with extranodal extension.  Immediate reconstruction.  · Received adjuvant chemotherapy with carboplatin and navelbine x4 cycles  · Initiated adjuvant tamoxifen 6/22/2004  · Adjuvant radiation therapy initiated but interrupted due to chest wall cellulitis requiring removal of implants in August 2004  · Implant reconstruction again attempted with MRSA infection, implant removed 12/30/2005 with no further attempts made and reconstruction.  · Completed 5 years tamoxifen in June 2009 and subsequently transitioned to Femara.  Received Femara until June 2014.  · Patient experienced postmenopausal vaginal bleeding in 2013, negative endometrial biopsy and gynecologic evaluation.  · Patient last seen in [facility] 6/18/2014  · CT chest performed to evaluate bicuspid aortic valve and dilated asending aorta 7/12/2019.  CT showed no change in aorta however showed new free intraperitoneal fluid in the upper abdomen with mesenteric edema, concerning for carcinomatosis.  · CT abdomen and pelvis (without IV contrast) on 7/16/2019 with mesenteric thickening, small volume of complex fluid in the mesentery which was suspicious and possibly representative of  carcinomatosis, small amount of perihepatic fluid, small bowel loops tethered to omentum without obstruction, omental thickening, shotty retroperitoneal and pelvic lymph nodes (non-pathologically enlarged).  · PET scan 7/26/2019 with hypermetabolic omental activity, hypermetabolic small retroperitoneal lymph nodes, hypermetabolic activity throughout uterus with increase in size.  · CT-guided omental biopsy 7/30/2019 with metastatic lobular carcinoma of breast primary, ER positive (greater than 95%), RI positive (90%), HER-2/tj negative (1+ IHC)  · Baseline CA 15-3 on 7/22/19 was 32.6  · Initiation of Aromasin 25 mg daily 8/12/2019.  Initiated Ibrance on 8/26/2019 at 100 mg 21/28 days on 8/26/2019.    · Response on CT scans 10/16/2019 following 2 cycles of Ibrance/Aromasin.  · Stable findings on subsequent CT chest abdomen pelvis 12/11/2019.  Further improvement in CA 15-3-23.9 on 12/18/2019.  Ibrance/Aromasin continued.    · Foundation medicine liquid biopsy 2/5/2020: MSI undetermined, mutations in BETH, NF1, CHEK2.  No evidence of PIK3CA mutation.  2. Anemia:  · Normocytic anemia, hemoglobin in 10-11 range  · Anemia evaluation 7/22/2019 with iron 30, ferritin 85.2, iron saturation 10%, TIBC 311, B12 370, erythropoietin level 20.4  · Anemia felt in large part to be related to CKD3 as well as to underlying malignancy  · Evidence of folate deficiency 8/12/2019 with level 3.84, initiated folic acid 1 mg daily  · If hemoglobin consistently below 10 consider use of Procrit      HISTORY OF PRESENT ILLNESS:  The patient is a 59 y.o. year old female who is here for follow-up with the above-mentioned history.    History of Present Illness   patient returns today in follow-up continuing on Aromasin 25 mg daily and Ibrance 100 mg daily for 21/28 days.  She reports stable fatigue.  She denies rash, fevers, trouble with her bowels, or worsening shortness of breath.  She does have occasional nauseousness that quickly  resolves.    Past Medical History:   Diagnosis Date   • Anemia    • Anxiety and depression    • Bicuspid aortic valve    • Breast cancer (CMS/HCC)     RIGHT, HAD NEELA. MASTECTOMY, CHEMO AND RADIATION   • CKD (chronic kidney disease)    • Diabetes mellitus (CMS/HCC)    • Frequent UTI    • Heart murmur    • History of kidney stones    • History of MRSA infection     POST BREAST SURGERY-TREATED BHL   • History of sepsis     KIDNEY STONE   • Hyperlipidemia    • Hypertension    • Hyperthyroidism    • Hypothyroidism    • Lymphedema of leg    • Metastasis from breast cancer (CMS/HCC)     STOMACH-OMENTUM   • Neuromuscular disorder (CMS/HCC)    • Obesity    • ANDREW on CPAP    • Osteoarthrosis    • Radiculopathy    • Thickened endometrium    • Tremor      Past Surgical History:   Procedure Laterality Date   • BREAST RECONSTRUCTION      WITH IMPLANTS, AND REVISION, NOW REMOVED   • BREAST SURGERY     •  SECTION  1987   •  SECTION  1987   • D&C HYSTEROSCOPY N/A 2017    Procedure: DILATATION AND CURETTAGE, HYSTEROSCOPY ;  Surgeon: Cherie Oliveros MD;  Location: [facility] OR [facility] [place];  Service:    • D&C HYSTEROSCOPY N/A 2019    Procedure: DILATATION AND CURETTAGE HYSTEROSCOPY/POLYPECTOMY;  Surgeon: Chreie Oliveros MD;  Location: [facility] OR [facility] [place];  Service: Gynecology   • LYMPH NODE BIOPSY     • MASTECTOMY Bilateral 2003   • MASTECTOMY Bilateral          ONCOLOGIC HISTORY:  (History from previous dates can be found in the separate document.)    Current Outpatient Medications on File Prior to Visit   Medication Sig Dispense Refill   • amLODIPine (NORVASC) 10 MG tablet Take 10 mg by mouth Every Morning.     • aspirin 81 MG EC tablet Take 81 mg by mouth Daily. HELD FOR SURGERY     • atorvastatin (LIPITOR) 40 MG tablet Take 1 tablet by mouth Daily. 90 tablet 1   • carvedilol (COREG) 3.125 MG tablet TAKE ONE TABLET BY MOUTH TWICE A DAY 60 tablet 2   • Dulaglutide 1.5 MG/0.5ML  solution pen-injector Inject 1.5 mg under the skin into the appropriate area as directed 1 (One) Time Per Week.  12 pen 1   • DULoxetine (CYMBALTA) 60 MG capsule      • exemestane (AROMASIN) 25 MG chemo tablet TAKE ONE TABLET BY MOUTH DAILY 30 tablet 3   • folic acid (FOLVITE) 1 MG tablet TAKE ONE TABLET BY MOUTH DAILY 90 tablet 0   • gabapentin (NEURONTIN) 300 MG capsule Take 2 capsules by mouth 3 (Three) Times a Day. 540 capsule 1   • LEVEMIR FLEXTOUCH 100 UNIT/ML injection Inject 50 Units under the skin into the appropriate area as directed 2 (Two) Times a Day. 3 pen 5   • levothyroxine (SYNTHROID, LEVOTHROID) 50 MCG tablet Take 1 tablet by mouth Daily. 90 tablet 1   • ondansetron (Zofran) 8 MG tablet Take 1 tablet by mouth Every 8 (Eight) Hours As Needed for Nausea or Vomiting. 30 tablet 2   • Palbociclib (IBRANCE) 100 MG capsule capsule TAKE 1 CAPSULE BY MOUTH DAILY FOR 21 DAYS ON THEN 7 DAYS OFF 21 capsule 5   • repaglinide (PRANDIN) 2 MG tablet Take 1 tablet by mouth 3 (Three) Times a Day Before Meals. 270 tablet 1   • vitamin D (ERGOCALCIFEROL) 88357 units capsule capsule 50,000 Units Every 7 (Seven) Days.       No current facility-administered medications on file prior to visit.        ALLERGIES:   No Known Allergies    Social History     Socioeconomic History   • Marital status:      Spouse name: Not on file   • Number of children: Not on file   • Years of education: Not on file   • Highest education level: Not on file   Tobacco Use   • Smoking status: Never Smoker   • Smokeless tobacco: Never Used   Substance and Sexual Activity   • Alcohol use: No   • Drug use: No   • Sexual activity: Yes     Partners: Male     Birth control/protection: Post-menopausal     Family History   Problem Relation Age of Onset   • Coronary artery disease Mother         Mother  from MI at 72   • Diabetes Mother    • Breast cancer Mother    • Hyperlipidemia Mother    • Arthritis Mother    • Cancer Mother    •  Aortic aneurysm Father         Father with ruptured thoracic aortic aneurysm   • Coronary artery disease Father         Father  from MI at 74   • Heart disease Father    • Hyperlipidemia Father    • Arthritis Father    • Coronary artery disease Maternal Grandmother         MGROCK deceeased from MI at age 76   • Malig Hyperthermia Neg Hx               Review of Systems   Constitutional: Positive for fatigue. Negative for activity change, appetite change, fever and unexpected weight change.   HENT: Negative for congestion, mouth sores, nosebleeds, sore throat and voice change.    Respiratory: Negative for cough, shortness of breath and wheezing.    Cardiovascular: Negative for chest pain, palpitations and leg swelling.   Gastrointestinal: Positive for nausea. Negative for abdominal distention, abdominal pain, blood in stool, constipation, diarrhea and vomiting.   Endocrine: Negative for cold intolerance and heat intolerance.   Genitourinary: Negative for difficulty urinating, dysuria, frequency and hematuria.   Musculoskeletal: Negative for arthralgias, back pain, joint swelling and myalgias.   Skin: Negative for rash.   Neurological: Positive for numbness. Negative for dizziness, syncope, weakness, light-headedness and headaches.   Hematological: Negative for adenopathy. Does not bruise/bleed easily.   Psychiatric/Behavioral: Negative for confusion and sleep disturbance. The patient is not nervous/anxious.          Objective      Vitals:    07/10/20 1340   BP: 159/67   Pulse: 81   Resp: 22   Temp: 97.3 °F (36.3 °C)   SpO2: 93%      Current Status 7/10/2020   ECOG score 1     Pain Score    07/10/20 1340   PainSc: 0-No pain         Physical Exam   Constitutional: She is oriented to person, place, and time. She appears well-developed and well-nourished.   HENT:   Mouth/Throat: Oropharynx is clear and moist.   Eyes: Conjunctivae are normal.   Neck: No thyromegaly present.   Cardiovascular: Normal rate and regular  rhythm. Exam reveals no gallop and no friction rub.   Murmur heard.  Pulmonary/Chest: Breath sounds normal. No respiratory distress.   Abdominal: Soft. Bowel sounds are normal. She exhibits no distension. There is no tenderness.   Musculoskeletal: She exhibits edema.   1+ chronic bilateral lower extremity edema with venous stasis changes   Lymphadenopathy:        Head (right side): No submandibular adenopathy present.     She has no cervical adenopathy.     She has no axillary adenopathy.        Right: No inguinal and no supraclavicular adenopathy present.        Left: No inguinal and no supraclavicular adenopathy present.   Neurological: She is alert and oriented to person, place, and time. She displays normal reflexes. No cranial nerve deficit. She exhibits normal muscle tone.   Skin: Skin is warm and dry. No rash noted.   Psychiatric: She has a normal mood and affect. Her behavior is normal.   The patient was examined today 7/10/2020, unchanged from above    RECENT LABS:  Hematology WBC   Date Value Ref Range Status   07/10/2020 2.86 (L) 3.40 - 10.80 10*3/mm3 Final   03/16/2019 7.6 3.4 - 10.8 x10E3/uL Final     RBC   Date Value Ref Range Status   07/10/2020 2.76 (L) 3.77 - 5.28 10*6/mm3 Final   03/16/2019 3.80 3.77 - 5.28 x10E6/uL Final     Hemoglobin   Date Value Ref Range Status   07/10/2020 9.2 (L) 12.0 - 15.9 g/dL Final     Hematocrit   Date Value Ref Range Status   07/10/2020 28.5 (L) 34.0 - 46.6 % Final     Platelets   Date Value Ref Range Status   07/10/2020 198 140 - 450 10*3/mm3 Final        Lab Results   Component Value Date    GLUCOSE 214 (H) 06/26/2020    BUN 36 (H) 06/26/2020    CREATININE 2.38 (C) 06/26/2020    EGFRIFNONA 21 (L) 06/26/2020    EGFRIFAFRI 35 (L) 03/16/2019    BCR 15.1 06/26/2020    K 5.0 (H) 06/26/2020    CO2 25.2 06/26/2020    CALCIUM 9.0 06/26/2020    PROTENTOTREF 7.0 03/16/2019    ALBUMIN 3.90 06/26/2020    LABIL2 1.2 03/16/2019    AST 13 06/26/2020    ALT 9 06/26/2020          Assessment/Plan    1. Metastatic lobular breast cancer (ER/TX positive, HER-2/tj negative):  · History of stage IIIC right breast cancer ([id number])  · Patient treated previously in the [facility] system  · Diagnosis via excisional biopsy 8/23/2003 with lobular carcinoma, 4.5 cm, positive margins.  ER/TX positive, HER-2/tj negative.  · Staging evaluation in September 2003 with negative bone scan and negative CT scans.  Ejection fraction 65%.  · Received neoadjuvant chemotherapy (? GET regimen) which apparently included Taxotere and possibly epirubicin.  Received 6 cycles.  · 1/22/2004 bilateral mastectomy with right axillary dissection.  Per available records, 10-12 cm residual invasive lobular carcinoma in the breast with 14/16 lymph nodes positive with extranodal extension.  Immediate reconstruction.  · Received adjuvant chemotherapy with carboplatin and navelbine x4 cycles  · Initiated adjuvant tamoxifen 6/22/2004  · Adjuvant radiation therapy initiated but interrupted due to chest wall cellulitis requiring removal of implants in August 2004  · Implant reconstruction again attempted with MRSA infection, implant removed 12/30/2005 with no further attempts made and reconstruction.  · Completed 5 years tamoxifen in June 2009 and subsequently transitioned to Femara.  Received Femara until June 2014.  · Patient experienced postmenopausal vaginal bleeding in 2013, negative endometrial biopsy and gynecologic evaluation.  · Patient last seen in [facility] 6/18/2014  · CT chest performed to evaluate bicuspid aortic valve and dilated asending aorta 7/12/2019.  CT showed no change in aorta however showed new free intraperitoneal fluid in the upper abdomen with mesenteric edema, concerning for carcinomatosis.  · CT abdomen and pelvis (without IV contrast) on 7/16/2019 with mesenteric thickening, small volume of complex fluid in the mesentery which was suspicious and possibly representative of carcinomatosis, small  amount of perihepatic fluid, small bowel loops tethered to omentum without obstruction, omental thickening, shotty retroperitoneal and pelvic lymph nodes (non-pathologically enlarged).  · PET scan 7/26/2019 with hypermetabolic omental activity, hypermetabolic small retroperitoneal lymph nodes, hypermetabolic activity throughout uterus with increase in size.  · CT-guided omental biopsy 7/30/2019 with metastatic lobular carcinoma of breast primary, ER positive (greater than 95%), VT positive (90%), HER-2/tj negative (1+ IHC)  · Baseline CA 15-3 on 7/22/19 was 32.6  · Initiation of Aromasin 25 mg daily 8/12/2019.  Initiated Ibrance at 100 mg 21/28 days on 8/26/2019.  · Improvement in CA 15-3 on 9/17/19-26.3  · Following 2 cycles of Aromasin and Ibrance, CA 15-3 declined to 25.9 on 10/15/2019.  CT scan chest abdomen pelvis 10/16/2019 showed improvement in the mesenteric and omental thickening and near resolution of complex ascites.  Treatment continued.  · Stable findings on subsequent CT chest abdomen pelvis 12/11/2019.  Further improvement in CA 15-3-23.9 on 12/18/2019.  Ibrance/Aromasin continued.   · Foundation medicine liquid biopsy 2/5/2020: MSI undetermined, mutations in BETH, NF1, CHEK2.  No evidence of PIK3CA mutation.  · Slight increase in CA 15-3 to 27.1 on 2/19/2020 and 29.1 on 3/18/2020.  CT however on 3/13/2020 with no new findings.  · Subsequent improvement in CA 15-3-26.5 on 5/15/2020  · Patient returns today in follow-up continuing on Aromasin 25 mg daily and Ibrance 100 mg daily for 21/28 days.  She continues with stable fatigue and occasional nauseousness that is improved with antiemetic.  She denies any new symptoms, recent fevers, or increased shortness of breath.  In 2 weeks we will check CBC, CMP and also recheck CA 15-3.  In 3 weeks she will undergo CT scans and Dr. Irvin will see her in 4 weeks for follow-up.  2. Normocytic anemia in the setting of CKD3:  · The patient appears to have a chronic  anemia with hemoglobin in the 10-11 range with normal MCV.  · Patient has underlying CKD3 with baseline creatinine recently in the 1.5-1.8 range.  · Anemia evaluation 7/22/2019 with iron 30, ferritin 85.2, iron saturation 10%, TIBC 311, B12 370, erythropoietin level 20.4  · Anemia felt in large part to be related to CKD3 as well as to underlying malignancy  · Evidence of folate deficiency 8/12/2019 with level 3.84, initiated folic acid 1 mg daily  · Additional labs on 12/18/2019 with iron 73, ferritin 82.2, iron saturation 25%, TIBC 290.  We did discuss the potential use of Procrit if her hemoglobin declines into the low 9/upper 8 range.  · Hemoglobin today slightly declined to 9.2.  We will add ferritin and iron studies next labs.  3. CKD3:  · Baseline creatinine recently in 1.6-2.0 range  · On 9/10/2019, RYAN/CKD3 with creatinine up to 2.44, BUN of 40.  Received 1 L normal saline.  Patient with increase in oral fluid intake.  · Renal ultrasound 9/20/2019 with no evidence of obstructive uropathy.  · Diminished oral intake due to nausea from Ibrance, creatinine 2.79 with BUN 43 on 10/15/2019.  Received 1 L normal saline.  Repeat labs on 10/18/2019 with BUN 31, creatinine 2.0.  · During cycle 3 Ibrance, patient developed increase in creatinine on 11/6/2019 to 2.35 and received 1 L normal saline.  · Patient is trying to maintain adequate oral hydration.    · It appears at this point that her new baseline creatinine is in the 2-2.4 range.    · Creatinine slightly increased from last visit at 2.54 today.  This however is not her high and recently of 2.68 on 5/15/2020.  Oral fluids encouraged.  4. CHF with mild to moderate aortic stenosis:  · Recent cardiology evaluation with echocardiogram 7/12/2019 showing ejection fraction 48% with moderate dilation of the LV cavity, global hypokinesis, grade 2 diastolic dysfunction, mild to moderate aortic stenosis, trivial pericardial effusion.  · CT chest 7/12/2019 with no change in  the aorta compared to prior study 12/13/2017.  5. Left upper and bilateral lower extremity peripheral neuropathy:  · Patient reports that left upper extremity neuropathy was not present from previous chemotherapy but occurred after hospitalization that required intubation and critical care stay.  Apparently felt to represent critical care neuropathy.  Improved over time.  · Bilateral lower extremity neuropathy felt to be related to diabetes  · May have future implications regarding treatment for breast cancer.  · The patient is currently continuing on gabapentin 600 mg 3 times daily.  6. Uterine/endometrial activity on PET scan:  · Patient experienced postmenopausal vaginal bleeding in 2013, negative endometrial biopsy and gynecologic evaluation.  · With findings on PET scan with enlarging uterus and some hypermetabolic activity, referred back to Dr. Oliveros and gynecology.    · 9/19/2019 D&C with hysteroscopy by Dr. Oliveros, no significant intraoperative findings, pathologic results with benign findings, no evidence of malignancy.  7. Nausea:  · Mild, relieved with Zofran.    8. Myelosuppression secondary to Ibrance:  · With cycle 1, jessica WBC 2.49 with ANC 1.25 and platelet count 140,000.  · With cycle 2, jessica WBC 2.33 with ANC 1.06, maintained normal platelet count  · 6/12/2020, WBC 3.85 with ANC 2.53 and platelet count 227,000.  · Labs today show WBC 2.86 with ANC 1.76 and platelet count 198,000     Plan:  1. Continue Aromasin 25 mg daily.    2. Continue Ibrance 100 mg daily for 21/28 days.  3. Continue oral folic acid 1 mg daily  4. The patient will continue with increased oral fluid intake at home  5. In 2 weeks CBC, CMP, CA 15-3, ferritin, iron panel and RN review  6. In 3 weeks CT chest abdomen pelvis (without IV contrast)  7. In 4 weeks MD visit with CBC, CMP    Patient continues on high risk medication requiring intensive monitoring.

## 2020-07-14 NOTE — TELEPHONE ENCOUNTER
Ibrance refill request rec electronically from [facility]. Per last office note-pt is to continue 100 mg daily for 21 days on then 7 days off. Her last delivery was on 7/8/2020. Request approved.

## 2020-07-18 NOTE — PROGRESS NOTES
Date of Office Visit:  2020  Encounter Provider: Pasha Harkins MD  Place of Service: [facility] CARDIOLOGY  Patient Name: Juana Hall  :1960    Chief complaint: Follow-up for bicuspid aortic valve, mild aortic stenosis, mildly   dilated ascending aorta.    History of Present Illness:    I again had the pleasure of seeing the patient in cardiology office on 2020.  She is   a very pleasant 59 year-old white female with a past medical history significant for a   bicuspid aortic valve, aortic stenosis, prior breast cancer, diabetes, and chronic kidney   disease who presents for follow-up.  I initially saw her on 2017.  The patient   underwent an echocardiogram on 10/23/2017 for a systolic murmur.  This showed a   likely bicuspid aortic valve which was heavily calcified.  She had mild aortic stenosis   with a peak gradient of 19 mmHg, and a mean gradient of 11 mmHg.  Her father also   had a ruptured thoracic aortic aneurysm.  Both of her parents had coronary artery   disease, with her mother having a fatal MI at age 72, and her father having a fatal MI at   age 74.  She did undergo a CT scan of the chest without contrast on 2017   (contrast was not used secondary to her advanced kidney disease).  This did not show   any evidence of aneurysm, although her ascending aorta was mildly dilated at 3.8 cm.     The patient presents today for follow-up.  Unfortunately, since her last visit, she has   been diagnosed with metastatic breast cancer and carcinomatosis related to this.  She   is seeing Dr. Irvin and [facility] for this.  The prognosis is evidently not good per the   patient.  She has completed some chemotherapy with Ibrance thus far.  She has gained   weight as she states that she was depressed after her diagnosis, and she was eating   whatever she wanted.  She denied any significant shortness of breath out of the ordinary.    She has had no chest  pain.    Past Medical History:   Diagnosis Date   • Anemia    • Anxiety and depression    • Bicuspid aortic valve    • Breast cancer (CMS/HCC)     RIGHT, HAD NEELA. MASTECTOMY, CHEMO AND RADIATION   • CKD (chronic kidney disease)    • Diabetes mellitus (CMS/HCC)    • Frequent UTI    • Heart murmur    • History of kidney stones    • History of MRSA infection     POST BREAST SURGERY-TREATED [facility]   • History of sepsis     KIDNEY STONE   • Hyperlipidemia    • Hypertension    • Hyperthyroidism    • Hypothyroidism    • Lymphedema of leg    • Metastasis from breast cancer (CMS/HCC)     STOMACH-OMENTUM   • Neuromuscular disorder (CMS/HCC)    • Obesity    • ANDREW on CPAP    • Osteoarthrosis    • Radiculopathy    • Thickened endometrium    • Tremor        Past Surgical History:   Procedure Laterality Date   • BREAST RECONSTRUCTION      WITH IMPLANTS, AND REVISION, NOW REMOVED   • BREAST SURGERY     •  SECTION  1987   •  SECTION  1987   • D&C HYSTEROSCOPY N/A 2017    Procedure: DILATATION AND CURETTAGE, HYSTEROSCOPY ;  Surgeon: Cherie Oliveros MD;  Location: [facility] OR [facility] [place];  Service:    • D&C HYSTEROSCOPY N/A 2019    Procedure: DILATATION AND CURETTAGE HYSTEROSCOPY/POLYPECTOMY;  Surgeon: Cherie Oliveros MD;  Location: [facility] OR [facility] [place];  Service: Gynecology   • LYMPH NODE BIOPSY     • MASTECTOMY Bilateral 2003   • MASTECTOMY Bilateral        Current Outpatient Medications on File Prior to Visit   Medication Sig Dispense Refill   • amLODIPine (NORVASC) 10 MG tablet Take 10 mg by mouth Every Morning.     • aspirin 81 MG EC tablet Take 81 mg by mouth Daily. HELD FOR SURGERY     • atorvastatin (LIPITOR) 40 MG tablet Take 1 tablet by mouth Daily. 90 tablet 1   • carvedilol (COREG) 3.125 MG tablet TAKE ONE TABLET BY MOUTH TWICE A DAY 60 tablet 2   • Dulaglutide 1.5 MG/0.5ML solution pen-injector Inject 1.5 mg under the skin into the appropriate area as directed 1 (One)  Time Per Week.  12 pen 1   • DULoxetine (CYMBALTA) 60 MG capsule      • exemestane (AROMASIN) 25 MG chemo tablet TAKE ONE TABLET BY MOUTH DAILY 30 tablet 3   • folic acid (FOLVITE) 1 MG tablet TAKE ONE TABLET BY MOUTH DAILY 90 tablet 0   • gabapentin (NEURONTIN) 300 MG capsule Take 2 capsules by mouth 3 (Three) Times a Day. 540 capsule 1   • LEVEMIR FLEXTOUCH 100 UNIT/ML injection Inject 50 Units under the skin into the appropriate area as directed 2 (Two) Times a Day. 3 pen 5   • levothyroxine (SYNTHROID, LEVOTHROID) 50 MCG tablet Take 1 tablet by mouth Daily. 90 tablet 1   • ondansetron (Zofran) 8 MG tablet Take 1 tablet by mouth Every 8 (Eight) Hours As Needed for Nausea or Vomiting. 30 tablet 2   • repaglinide (PRANDIN) 2 MG tablet Take 1 tablet by mouth 3 (Three) Times a Day Before Meals. 270 tablet 1   • vitamin D (ERGOCALCIFEROL) 46866 units capsule capsule 50,000 Units Every 7 (Seven) Days.       No current facility-administered medications on file prior to visit.      Allergies as of 2020   • (No Known Allergies)     Social History     Socioeconomic History   • Marital status:      Spouse name: Not on file   • Number of children: Not on file   • Years of education: Not on file   • Highest education level: Not on file   Tobacco Use   • Smoking status: Never Smoker   • Smokeless tobacco: Never Used   Substance and Sexual Activity   • Alcohol use: No   • Drug use: No   • Sexual activity: Yes     Partners: Male     Birth control/protection: Post-menopausal     Family History   Problem Relation Age of Onset   • Coronary artery disease Mother         Mother  from MI at 72   • Diabetes Mother    • Breast cancer Mother    • Hyperlipidemia Mother    • Arthritis Mother    • Cancer Mother    • Aortic aneurysm Father         Father with ruptured thoracic aortic aneurysm   • Coronary artery disease Father         Father  from MI at 74   • Heart disease Father    • Hyperlipidemia  "Father    • Arthritis Father    • Coronary artery disease Maternal Grandmother         STEPHANI deceeased from MI at age 76   • Malig Hyperthermia Neg Hx        Review of Systems   Constitution: Positive for malaise/fatigue and weight gain.   Cardiovascular: Positive for leg swelling.   All other systems reviewed and are negative.     Objective:     Vitals:    07/09/20 1351   BP: 124/64   Pulse: 74   SpO2: 98%   Weight: (!) 188 kg (414 lb)   Height: 172.7 cm (67.99\")     Body mass index is 62.96 kg/m².    Physical Exam   Constitutional: She is oriented to person, place, and time. She appears well-developed.   Obese   HENT:   Head: Normocephalic and atraumatic.   Eyes: Conjunctivae are normal.   Neck: Neck supple.   Cardiovascular: Normal rate and regular rhythm. Exam reveals no gallop and no friction rub.   Murmur heard.   Systolic murmur is present with a grade of 3/6 at the upper right sternal border and upper left sternal border.  Pulmonary/Chest: Effort normal and breath sounds normal.   Abdominal: Soft. There is no tenderness.   Musculoskeletal:   Trace to 1+ edema of the lower extremities bilaterally    Neurological: She is alert and oriented to person, place, and time.   Skin: Skin is warm.   Psychiatric: She has a normal mood and affect. Her behavior is normal.     Lab Review:   Procedures  Cardiac Procedures:  1. Echocardiogram on 10/23/2017: The ejection fraction was low normal at 50-55%. There   was mild LVH.  There was grade 1 diastolic dysfunction.  The left atrium was mildly dilated.    A bicuspid aortic valve could not be excluded.  The aortic valve leaflets were heavily   calcified.  There was mild aortic stenosis with a peak gradient of 19 mmHg, and a mean   gradient of 11 mmHg.  2. CT scan of the chest without contrast on 12/13/2017: The ascending aorta was mildly   dilated at 3.8 cm.  There was no aortic aneurysm.  3.  Echocardiogram on 7/12/2019: The left ventricle was mildly to moderately enlarged.  "   There was borderline global hypokinesis.  The calculated ejection fraction was 48%.    There was grade 2 diastolic dysfunction.  The right ventricle was normal.  The aortic   valve was bicuspid and heavily calcified.  There was mild to moderate aortic stenosis   with a peak pressure of 28 mmHg and a mean pressure of 17 mmHg.  There were   several calcified chordae.  There was mild mitral regurgitation.       Assessment:       Diagnosis Plan   1. Congenital bicuspid aortic valve     2. Nonrheumatic aortic valve stenosis     3. Ascending aorta dilatation (CMS/HCC)       Plan:       I had a long discussion with the patient today.  Unfortunately, she has been diagnosed with metastatic breast cancer, including with carcinomatosis.  She has a bicuspid aortic valve, and bicuspid aortic valves are not currently approved for TAVR.  She would not be a candidate for any type of open heart surgery unless the prognosis for her breast cancer was good.  Per the patient, her prognosis is not very good currently.  She is seen Dr. Irvin and [facility], and has completed some chemotherapy thus far.      Her murmur is still mid peaking, and I feel this is still moderate or less aortic stenosis.  This does not sound severe to me.  I do not feel that serial echocardiography is going to be useful until we can see how she responds to chemotherapy.  This would also apply to the mildly dilated ascending aorta.  She does have advanced kidney disease, and if the dilated aorta needs to be evaluated in the future, contrast dye should not be utilized unless absolutely needed.  She is going to continue on the amlodipine and carvedilol.  Her blood pressure is actually excellent today.  I will see her back in 6 months and reevaluate her murmur at that time.  She will call with any major cardiac issues prior to this.    I spent 30 minutes in the care of the patient.  Greater than 50% of this time was spent face-to-face counseling with the patient  regarding her aortic stenosis, bicuspid aortic valve, and treatment plans in the setting of recently diagnosed metastatic breast cancer and carcinomatosis.

## 2020-07-24 ENCOUNTER — LAB (OUTPATIENT)
Dept: LAB | Facility: HOSPITAL | Age: 60
End: 2020-07-24

## 2020-07-24 DIAGNOSIS — C50.919 METASTATIC BREAST CANCER: ICD-10-CM

## 2020-07-24 DIAGNOSIS — D64.9 NORMOCYTIC ANEMIA: ICD-10-CM

## 2020-07-24 LAB
ALBUMIN SERPL-MCNC: 3.7 G/DL (ref 3.5–5.2)
ALBUMIN/GLOB SERPL: 1.1 G/DL (ref 1.1–2.4)
ALP SERPL-CCNC: 59 U/L (ref 38–116)
ALT SERPL W P-5'-P-CCNC: 8 U/L (ref 0–33)
ANION GAP SERPL CALCULATED.3IONS-SCNC: 11.1 MMOL/L (ref 5–15)
AST SERPL-CCNC: 12 U/L (ref 0–32)
BASOPHILS # BLD AUTO: 0.06 10*3/MM3 (ref 0–0.2)
BASOPHILS NFR BLD AUTO: 1.9 % (ref 0–1.5)
BILIRUB SERPL-MCNC: 0.6 MG/DL (ref 0.2–1.2)
BUN SERPL-MCNC: 31 MG/DL (ref 6–20)
BUN/CREAT SERPL: 12.2 (ref 7.3–30)
CALCIUM SPEC-SCNC: 8.5 MG/DL (ref 8.5–10.2)
CANCER AG15-3 SERPL-ACNC: 23.1 U/ML
CHLORIDE SERPL-SCNC: 103 MMOL/L (ref 98–107)
CO2 SERPL-SCNC: 27.9 MMOL/L (ref 22–29)
CREAT SERPL-MCNC: 2.55 MG/DL (ref 0.6–1.1)
DEPRECATED RDW RBC AUTO: 57.4 FL (ref 37–54)
EOSINOPHIL # BLD AUTO: 0.09 10*3/MM3 (ref 0–0.4)
EOSINOPHIL NFR BLD AUTO: 2.9 % (ref 0.3–6.2)
ERYTHROCYTE [DISTWIDTH] IN BLOOD BY AUTOMATED COUNT: 15.1 % (ref 12.3–15.4)
FERRITIN SERPL-MCNC: 66.7 NG/ML (ref 13–150)
GFR SERPL CREATININE-BSD FRML MDRD: 19 ML/MIN/1.73
GLOBULIN UR ELPH-MCNC: 3.5 GM/DL (ref 1.8–3.5)
GLUCOSE SERPL-MCNC: 211 MG/DL (ref 74–124)
HCT VFR BLD AUTO: 27 % (ref 34–46.6)
HGB BLD-MCNC: 8.6 G/DL (ref 12–15.9)
IMM GRANULOCYTES # BLD AUTO: 0.02 10*3/MM3 (ref 0–0.05)
IMM GRANULOCYTES NFR BLD AUTO: 0.6 % (ref 0–0.5)
IRON 24H UR-MRATE: 54 MCG/DL (ref 37–145)
IRON SATN MFR SERPL: 16 % (ref 14–48)
LYMPHOCYTES # BLD AUTO: 0.76 10*3/MM3 (ref 0.7–3.1)
LYMPHOCYTES NFR BLD AUTO: 24.4 % (ref 19.6–45.3)
MCH RBC QN AUTO: 33.3 PG (ref 26.6–33)
MCHC RBC AUTO-ENTMCNC: 31.9 G/DL (ref 31.5–35.7)
MCV RBC AUTO: 104.7 FL (ref 79–97)
MONOCYTES # BLD AUTO: 0.47 10*3/MM3 (ref 0.1–0.9)
MONOCYTES NFR BLD AUTO: 15.1 % (ref 5–12)
NEUTROPHILS NFR BLD AUTO: 1.71 10*3/MM3 (ref 1.7–7)
NEUTROPHILS NFR BLD AUTO: 55.1 % (ref 42.7–76)
NRBC BLD AUTO-RTO: 0 /100 WBC (ref 0–0.2)
PLATELET # BLD AUTO: 172 10*3/MM3 (ref 140–450)
PMV BLD AUTO: 8.8 FL (ref 6–12)
POTASSIUM SERPL-SCNC: 5.1 MMOL/L (ref 3.5–4.7)
PROT SERPL-MCNC: 7.2 G/DL (ref 6.3–8)
RBC # BLD AUTO: 2.58 10*6/MM3 (ref 3.77–5.28)
SODIUM SERPL-SCNC: 142 MMOL/L (ref 134–145)
TIBC SERPL-MCNC: 328 MCG/DL (ref 249–505)
TRANSFERRIN SERPL-MCNC: 234 MG/DL (ref 200–360)
WBC # BLD AUTO: 3.11 10*3/MM3 (ref 3.4–10.8)

## 2020-07-24 PROCEDURE — 83540 ASSAY OF IRON: CPT

## 2020-07-24 PROCEDURE — 80053 COMPREHEN METABOLIC PANEL: CPT

## 2020-07-24 PROCEDURE — 86300 IMMUNOASSAY TUMOR CA 15-3: CPT | Performed by: INTERNAL MEDICINE

## 2020-07-24 PROCEDURE — 84466 ASSAY OF TRANSFERRIN: CPT

## 2020-07-24 PROCEDURE — 36415 COLL VENOUS BLD VENIPUNCTURE: CPT

## 2020-07-24 PROCEDURE — 82728 ASSAY OF FERRITIN: CPT

## 2020-07-24 PROCEDURE — 85025 COMPLETE CBC W/AUTO DIFF WBC: CPT

## 2020-07-28 ENCOUNTER — TELEPHONE (OUTPATIENT)
Dept: ONCOLOGY | Facility: HOSPITAL | Age: 60
End: 2020-07-28

## 2020-07-28 NOTE — TELEPHONE ENCOUNTER
----- Message from Juana Hall sent at 7/27/2020  6:39 PM EDT -----  Regarding: Non-Urgent Medical Question  Contact: [phone]  I had bloodwork done on. Friday, and got the results today. I am not a doctor but I feel like they are pretty good.  But for the last few days I have been having shivering and a slight headache. No. Temperature, just uncontrollable shivering. My home number is [phone]. Please  Advise what is going on

## 2020-07-31 ENCOUNTER — HOSPITAL ENCOUNTER (OUTPATIENT)
Dept: PET IMAGING | Facility: HOSPITAL | Age: 60
Discharge: HOME | End: 2020-07-31
Admitting: INTERNAL MEDICINE

## 2020-07-31 DIAGNOSIS — C50.919 METASTATIC BREAST CANCER: ICD-10-CM

## 2020-07-31 PROCEDURE — 0 DIATRIZOATE MEGLUMINE & SODIUM PER 1 ML: Performed by: INTERNAL MEDICINE

## 2020-07-31 PROCEDURE — 71250 CT THORAX DX C-: CPT

## 2020-07-31 PROCEDURE — 74176 CT ABD & PELVIS W/O CONTRAST: CPT

## 2020-07-31 RX ADMIN — DIATRIZOATE MEGLUMINE AND DIATRIZOATE SODIUM 30 ML: 660; 100 LIQUID ORAL; RECTAL at 07:55

## 2020-08-07 ENCOUNTER — INFUSION (OUTPATIENT)
Dept: ONCOLOGY | Facility: HOSPITAL | Age: 60
End: 2020-08-07

## 2020-08-07 ENCOUNTER — LAB (OUTPATIENT)
Dept: LAB | Facility: HOSPITAL | Age: 60
End: 2020-08-07

## 2020-08-07 ENCOUNTER — OFFICE VISIT (OUTPATIENT)
Dept: ONCOLOGY | Facility: CLINIC | Age: 60
End: 2020-08-07

## 2020-08-07 VITALS
SYSTOLIC BLOOD PRESSURE: 190 MMHG | HEART RATE: 77 BPM | OXYGEN SATURATION: 95 % | TEMPERATURE: 97.3 F | WEIGHT: 293 LBS | HEIGHT: 68 IN | RESPIRATION RATE: 22 BRPM | DIASTOLIC BLOOD PRESSURE: 80 MMHG | BODY MASS INDEX: 44.41 KG/M2

## 2020-08-07 DIAGNOSIS — C50.919 METASTATIC BREAST CANCER: ICD-10-CM

## 2020-08-07 DIAGNOSIS — D63.1 ANEMIA, CHRONIC RENAL FAILURE, STAGE 3 (MODERATE) (HCC): ICD-10-CM

## 2020-08-07 DIAGNOSIS — D50.8 OTHER IRON DEFICIENCY ANEMIA: ICD-10-CM

## 2020-08-07 DIAGNOSIS — C50.919 METASTATIC BREAST CANCER: Primary | ICD-10-CM

## 2020-08-07 DIAGNOSIS — D50.9 IRON DEFICIENCY ANEMIA, UNSPECIFIED IRON DEFICIENCY ANEMIA TYPE: Primary | ICD-10-CM

## 2020-08-07 DIAGNOSIS — N18.30 ANEMIA, CHRONIC RENAL FAILURE, STAGE 3 (MODERATE) (HCC): ICD-10-CM

## 2020-08-07 LAB
ALBUMIN SERPL-MCNC: 3.6 G/DL (ref 3.5–5.2)
ALBUMIN/GLOB SERPL: 0.9 G/DL (ref 1.1–2.4)
ALP SERPL-CCNC: 58 U/L (ref 38–116)
ALT SERPL W P-5'-P-CCNC: 13 U/L (ref 0–33)
ANION GAP SERPL CALCULATED.3IONS-SCNC: 12.4 MMOL/L (ref 5–15)
AST SERPL-CCNC: 13 U/L (ref 0–32)
BASOPHILS # BLD AUTO: 0.05 10*3/MM3 (ref 0–0.2)
BASOPHILS NFR BLD AUTO: 1.6 % (ref 0–1.5)
BILIRUB SERPL-MCNC: 0.3 MG/DL (ref 0.2–1.2)
BUN SERPL-MCNC: 39 MG/DL (ref 6–20)
BUN/CREAT SERPL: 14.3 (ref 7.3–30)
CALCIUM SPEC-SCNC: 8.8 MG/DL (ref 8.5–10.2)
CHLORIDE SERPL-SCNC: 107 MMOL/L (ref 98–107)
CO2 SERPL-SCNC: 23.6 MMOL/L (ref 22–29)
CREAT SERPL-MCNC: 2.72 MG/DL (ref 0.6–1.1)
DEPRECATED RDW RBC AUTO: 58 FL (ref 37–54)
EOSINOPHIL # BLD AUTO: 0.09 10*3/MM3 (ref 0–0.4)
EOSINOPHIL NFR BLD AUTO: 2.9 % (ref 0.3–6.2)
ERYTHROCYTE [DISTWIDTH] IN BLOOD BY AUTOMATED COUNT: 14.9 % (ref 12.3–15.4)
GFR SERPL CREATININE-BSD FRML MDRD: 18 ML/MIN/1.73
GLOBULIN UR ELPH-MCNC: 3.9 GM/DL (ref 1.8–3.5)
GLUCOSE SERPL-MCNC: 271 MG/DL (ref 74–124)
HCT VFR BLD AUTO: 27.5 % (ref 34–46.6)
HGB BLD-MCNC: 8.4 G/DL (ref 12–15.9)
IMM GRANULOCYTES # BLD AUTO: 0.03 10*3/MM3 (ref 0–0.05)
IMM GRANULOCYTES NFR BLD AUTO: 1 % (ref 0–0.5)
LYMPHOCYTES # BLD AUTO: 0.66 10*3/MM3 (ref 0.7–3.1)
LYMPHOCYTES NFR BLD AUTO: 21.5 % (ref 19.6–45.3)
MCH RBC QN AUTO: 32.6 PG (ref 26.6–33)
MCHC RBC AUTO-ENTMCNC: 30.5 G/DL (ref 31.5–35.7)
MCV RBC AUTO: 106.6 FL (ref 79–97)
MONOCYTES # BLD AUTO: 0.15 10*3/MM3 (ref 0.1–0.9)
MONOCYTES NFR BLD AUTO: 4.9 % (ref 5–12)
NEUTROPHILS NFR BLD AUTO: 2.09 10*3/MM3 (ref 1.7–7)
NEUTROPHILS NFR BLD AUTO: 68.1 % (ref 42.7–76)
NRBC BLD AUTO-RTO: 0 /100 WBC (ref 0–0.2)
PLATELET # BLD AUTO: 252 10*3/MM3 (ref 140–450)
PMV BLD AUTO: 9.2 FL (ref 6–12)
POTASSIUM SERPL-SCNC: 5.3 MMOL/L (ref 3.5–4.7)
PROT SERPL-MCNC: 7.5 G/DL (ref 6.3–8)
RBC # BLD AUTO: 2.58 10*6/MM3 (ref 3.77–5.28)
SODIUM SERPL-SCNC: 143 MMOL/L (ref 134–145)
WBC # BLD AUTO: 3.07 10*3/MM3 (ref 3.4–10.8)

## 2020-08-07 PROCEDURE — 80053 COMPREHEN METABOLIC PANEL: CPT

## 2020-08-07 PROCEDURE — 85025 COMPLETE CBC W/AUTO DIFF WBC: CPT

## 2020-08-07 PROCEDURE — 96374 THER/PROPH/DIAG INJ IV PUSH: CPT

## 2020-08-07 PROCEDURE — 36415 COLL VENOUS BLD VENIPUNCTURE: CPT

## 2020-08-07 PROCEDURE — 25010000002 FERRIC CARBOXYMALTOSE 750 MG/15ML SOLUTION 15 ML VIAL: Performed by: INTERNAL MEDICINE

## 2020-08-07 PROCEDURE — 99215 OFFICE O/P EST HI 40 MIN: CPT | Performed by: INTERNAL MEDICINE

## 2020-08-07 PROCEDURE — 63710000001 PROCHLORPERAZINE MALEATE PER 5 MG: Performed by: INTERNAL MEDICINE

## 2020-08-07 RX ORDER — PROCHLORPERAZINE MALEATE 10 MG
10 TABLET ORAL ONCE
Status: CANCELLED
Start: 2020-08-07

## 2020-08-07 RX ORDER — SODIUM CHLORIDE 9 MG/ML
250 INJECTION, SOLUTION INTRAVENOUS ONCE
Status: COMPLETED | OUTPATIENT
Start: 2020-08-07 | End: 2020-08-07

## 2020-08-07 RX ORDER — SODIUM CHLORIDE 9 MG/ML
250 INJECTION, SOLUTION INTRAVENOUS ONCE
Status: CANCELLED | OUTPATIENT
Start: 2020-08-14

## 2020-08-07 RX ORDER — SODIUM CHLORIDE 9 MG/ML
250 INJECTION, SOLUTION INTRAVENOUS ONCE
Status: CANCELLED | OUTPATIENT
Start: 2020-08-07

## 2020-08-07 RX ORDER — PROCHLORPERAZINE MALEATE 5 MG/1
10 TABLET ORAL ONCE
Status: COMPLETED | OUTPATIENT
Start: 2020-08-07 | End: 2020-08-07

## 2020-08-07 RX ORDER — PROCHLORPERAZINE MALEATE 10 MG
10 TABLET ORAL ONCE
Status: CANCELLED
Start: 2020-08-14

## 2020-08-07 RX ADMIN — PROCHLORPERAZINE MALEATE 10 MG: 5 TABLET ORAL at 15:35

## 2020-08-07 RX ADMIN — SODIUM CHLORIDE 750 MG: 900 INJECTION, SOLUTION INTRAVENOUS at 15:42

## 2020-08-07 RX ADMIN — SODIUM CHLORIDE 250 ML: 9 INJECTION, SOLUTION INTRAVENOUS at 15:42

## 2020-08-10 DIAGNOSIS — E03.9 ACQUIRED HYPOTHYROIDISM: ICD-10-CM

## 2020-08-10 RX ORDER — LEVOTHYROXINE SODIUM 0.05 MG/1
TABLET ORAL
Qty: 90 TABLET | Refills: 0 | OUTPATIENT
Start: 2020-08-10

## 2020-08-14 ENCOUNTER — INFUSION (OUTPATIENT)
Dept: ONCOLOGY | Facility: HOSPITAL | Age: 60
End: 2020-08-14

## 2020-08-14 DIAGNOSIS — D50.9 IRON DEFICIENCY ANEMIA, UNSPECIFIED IRON DEFICIENCY ANEMIA TYPE: Primary | ICD-10-CM

## 2020-08-14 PROCEDURE — G0463 HOSPITAL OUTPT CLINIC VISIT: HCPCS

## 2020-08-14 RX ORDER — SODIUM CHLORIDE 9 MG/ML
250 INJECTION, SOLUTION INTRAVENOUS ONCE
Status: DISCONTINUED | OUTPATIENT
Start: 2020-08-14 | End: 2020-08-14 | Stop reason: HOSPADM

## 2020-08-14 RX ORDER — PROCHLORPERAZINE MALEATE 5 MG/1
10 TABLET ORAL ONCE
Status: DISCONTINUED | OUTPATIENT
Start: 2020-08-14 | End: 2020-08-14 | Stop reason: HOSPADM

## 2020-08-21 ENCOUNTER — TELEPHONE (OUTPATIENT)
Dept: ONCOLOGY | Facility: HOSPITAL | Age: 60
End: 2020-08-21

## 2020-08-21 ENCOUNTER — LAB (OUTPATIENT)
Dept: LAB | Facility: HOSPITAL | Age: 60
End: 2020-08-21

## 2020-08-21 ENCOUNTER — INFUSION (OUTPATIENT)
Dept: ONCOLOGY | Facility: HOSPITAL | Age: 60
End: 2020-08-21

## 2020-08-21 VITALS
RESPIRATION RATE: 18 BRPM | SYSTOLIC BLOOD PRESSURE: 154 MMHG | HEART RATE: 87 BPM | DIASTOLIC BLOOD PRESSURE: 74 MMHG | WEIGHT: 293 LBS | BODY MASS INDEX: 63.9 KG/M2 | TEMPERATURE: 97.8 F | OXYGEN SATURATION: 92 %

## 2020-08-21 DIAGNOSIS — D63.1 ANEMIA, CHRONIC RENAL FAILURE, STAGE 3 (MODERATE) (HCC): ICD-10-CM

## 2020-08-21 DIAGNOSIS — N18.30 ANEMIA, CHRONIC RENAL FAILURE, STAGE 3 (MODERATE) (HCC): ICD-10-CM

## 2020-08-21 DIAGNOSIS — D50.8 OTHER IRON DEFICIENCY ANEMIA: ICD-10-CM

## 2020-08-21 DIAGNOSIS — C50.919 METASTATIC BREAST CANCER: ICD-10-CM

## 2020-08-21 LAB
ALBUMIN SERPL-MCNC: 3.7 G/DL (ref 3.5–5.2)
ALBUMIN/GLOB SERPL: 1 G/DL (ref 1.1–2.4)
ALP SERPL-CCNC: 68 U/L (ref 38–116)
ALT SERPL W P-5'-P-CCNC: 7 U/L (ref 0–33)
ANION GAP SERPL CALCULATED.3IONS-SCNC: 11.5 MMOL/L (ref 5–15)
AST SERPL-CCNC: 11 U/L (ref 0–32)
BASOPHILS # BLD AUTO: 0.06 10*3/MM3 (ref 0–0.2)
BASOPHILS NFR BLD AUTO: 1.8 % (ref 0–1.5)
BILIRUB SERPL-MCNC: 0.5 MG/DL (ref 0.2–1.2)
BUN SERPL-MCNC: 28 MG/DL (ref 6–20)
BUN/CREAT SERPL: 13.2 (ref 7.3–30)
CALCIUM SPEC-SCNC: 9 MG/DL (ref 8.5–10.2)
CHLORIDE SERPL-SCNC: 103 MMOL/L (ref 98–107)
CO2 SERPL-SCNC: 24.5 MMOL/L (ref 22–29)
CREAT SERPL-MCNC: 2.12 MG/DL (ref 0.6–1.1)
DEPRECATED RDW RBC AUTO: 62.7 FL (ref 37–54)
EOSINOPHIL # BLD AUTO: 0.05 10*3/MM3 (ref 0–0.4)
EOSINOPHIL NFR BLD AUTO: 1.5 % (ref 0.3–6.2)
ERYTHROCYTE [DISTWIDTH] IN BLOOD BY AUTOMATED COUNT: 16.8 % (ref 12.3–15.4)
FERRITIN SERPL-MCNC: 353.9 NG/ML (ref 13–150)
GFR SERPL CREATININE-BSD FRML MDRD: 24 ML/MIN/1.73
GLOBULIN UR ELPH-MCNC: 3.8 GM/DL (ref 1.8–3.5)
GLUCOSE SERPL-MCNC: 198 MG/DL (ref 74–124)
HCT VFR BLD AUTO: 28 % (ref 34–46.6)
HGB BLD-MCNC: 8.7 G/DL (ref 12–15.9)
IMM GRANULOCYTES # BLD AUTO: 0.16 10*3/MM3 (ref 0–0.05)
IMM GRANULOCYTES NFR BLD AUTO: 4.7 % (ref 0–0.5)
IRON 24H UR-MRATE: 57 MCG/DL (ref 37–145)
IRON SATN MFR SERPL: 19 % (ref 14–48)
LYMPHOCYTES # BLD AUTO: 0.89 10*3/MM3 (ref 0.7–3.1)
LYMPHOCYTES NFR BLD AUTO: 26 % (ref 19.6–45.3)
MCH RBC QN AUTO: 32.5 PG (ref 26.6–33)
MCHC RBC AUTO-ENTMCNC: 31.1 G/DL (ref 31.5–35.7)
MCV RBC AUTO: 104.5 FL (ref 79–97)
MONOCYTES # BLD AUTO: 0.56 10*3/MM3 (ref 0.1–0.9)
MONOCYTES NFR BLD AUTO: 16.4 % (ref 5–12)
NEUTROPHILS NFR BLD AUTO: 1.7 10*3/MM3 (ref 1.7–7)
NEUTROPHILS NFR BLD AUTO: 49.6 % (ref 42.7–76)
NRBC BLD AUTO-RTO: 0.9 /100 WBC (ref 0–0.2)
PLATELET # BLD AUTO: 185 10*3/MM3 (ref 140–450)
PMV BLD AUTO: 9.6 FL (ref 6–12)
POTASSIUM SERPL-SCNC: 5.2 MMOL/L (ref 3.5–4.7)
PROT SERPL-MCNC: 7.5 G/DL (ref 6.3–8)
RBC # BLD AUTO: 2.68 10*6/MM3 (ref 3.77–5.28)
SODIUM SERPL-SCNC: 139 MMOL/L (ref 134–145)
TIBC SERPL-MCNC: 297 MCG/DL (ref 249–505)
TRANSFERRIN SERPL-MCNC: 212 MG/DL (ref 200–360)
WBC # BLD AUTO: 3.42 10*3/MM3 (ref 3.4–10.8)

## 2020-08-21 PROCEDURE — 82728 ASSAY OF FERRITIN: CPT

## 2020-08-21 PROCEDURE — 83540 ASSAY OF IRON: CPT

## 2020-08-21 PROCEDURE — 84466 ASSAY OF TRANSFERRIN: CPT

## 2020-08-21 PROCEDURE — 85025 COMPLETE CBC W/AUTO DIFF WBC: CPT

## 2020-08-21 PROCEDURE — 36415 COLL VENOUS BLD VENIPUNCTURE: CPT

## 2020-08-21 PROCEDURE — 80053 COMPREHEN METABOLIC PANEL: CPT

## 2020-08-21 NOTE — NURSING NOTE
Pt had ferritin and iron profile redrawn today. Does not qualify for iron at this time. Per , start procrit day he sees her.

## 2020-08-21 NOTE — TELEPHONE ENCOUNTER
----- Message from Leodan Irvin Jr., MD sent at 8/21/2020  3:25 PM EDT -----  We can just do the procrit when I see her  ----- Message -----  From: Bárbara Hughes RN  Sent: 8/21/2020   3:20 PM EDT  To: Leodan Irvin Jr., MD    Patient missed her second dose of injectafer due to having a fever and being sent for covid testing. Ferritin and iron profile was redrawn today and she does not qualify for any more iron now. She should qualify for procrit though I believe if that's what you were planning on starting. It takes a few days for insurance approval. She is due back on 9/1 to see you, do you want her to come back next week for procrit?

## 2020-08-24 ENCOUNTER — MEDICATION THERAPY MANAGEMENT (OUTPATIENT)
Dept: PHARMACY | Facility: HOSPITAL | Age: 60
End: 2020-08-24

## 2020-08-24 NOTE — PROGRESS NOTES
[facility] Lab Review- Ibrance + Aromasin      8/21/2020   WBC 3.40 - 10.80 10*3/mm3 3.42   Neutrophils Absolute 1.70 - 7.00 10*3/mm3 1.70   Hemoglobin 12.0 - 15.9 g/dL 8.7 (A)   Hematocrit 34.0 - 46.6 % 28.0 (A)   Platelets 140 - 450 10*3/mm3 185   Creatinine 0.60 - 1.10 mg/dL 2.12 (A)   eGFR Non African Am >60 mL/min/1.73 24 (A)   BUN 6 - 20 mg/dL 28 (A)   Sodium 134 - 145 mmol/L 139   Potassium 3.5 - 4.7 mmol/L 5.2 (A)   Glucose 74 - 124 mg/dL 198 (A)   Calcium 8.5 - 10.2 mg/dL 9.0   Albumin 3.50 - 5.20 g/dL 3.70   Total Protein 6.3 - 8.0 g/dL 7.5   AST (SGOT) 0 - 32 U/L 11   ALT (SGPT) 0 - 33 U/L 7   Alkaline Phosphatase 38 - 116 U/L 68   Total Bilirubin 0.2 - 1.2 mg/dL 0.5   Iron 37 - 145 mcg/dL 57   TIBC 249 - 505 mcg/dL 297   Ferritin 13.00 - 150.00 ng/mL 353.90 (A)   Iron Saturation 14 - 48 % 19     Pharmacy will continue to follow. Per RN note, plans for Procrit at next visit with MD.     Thanks,  Myah Argueta, PharmD

## 2020-09-02 NOTE — PROGRESS NOTES
Subjective .     REASONS FOR FOLLOWUP:    1. Metastatic lobular breast cancer (ER/DE positive, HER-2/tj negative):  · History of stage IIIC right breast cancer ([id number])  · Patient treated previously in the [facility] system  · Diagnosis via excisional biopsy 8/23/2003 with lobular carcinoma, 4.5 cm, positive margins.  ER/DE positive, HER-2/tj negative.  · Staging evaluation in September 2003 with negative bone scan and negative CT scans.  Ejection fraction 65%.  · Received neoadjuvant chemotherapy (? GET regimen) which apparently included Taxotere and possibly epirubicin.  Received 6 cycles.  · 1/22/2004 bilateral mastectomy with right axillary dissection.  Per available records, 10-12 cm residual invasive lobular carcinoma in the breast with 14/16 lymph nodes positive with extranodal extension.  Immediate reconstruction.  · Received adjuvant chemotherapy with carboplatin and navelbine x4 cycles  · Initiated adjuvant tamoxifen 6/22/2004  · Adjuvant radiation therapy initiated but interrupted due to chest wall cellulitis requiring removal of implants in August 2004  · Implant reconstruction again attempted with MRSA infection, implant removed 12/30/2005 with no further attempts made and reconstruction.  · Completed 5 years tamoxifen in June 2009 and subsequently transitioned to Femara.  Received Femara until June 2014.  · Patient experienced postmenopausal vaginal bleeding in 2013, negative endometrial biopsy and gynecologic evaluation.  · Patient last seen in [facility] 6/18/2014  · CT chest performed to evaluate bicuspid aortic valve and dilated asending aorta 7/12/2019.  CT showed no change in aorta however showed new free intraperitoneal fluid in the upper abdomen with mesenteric edema, concerning for carcinomatosis.  · CT abdomen and pelvis (without IV contrast) on 7/16/2019 with mesenteric thickening, small volume of complex fluid in the mesentery which was suspicious and possibly representative of  carcinomatosis, small amount of perihepatic fluid, small bowel loops tethered to omentum without obstruction, omental thickening, shotty retroperitoneal and pelvic lymph nodes (non-pathologically enlarged).  · PET scan 7/26/2019 with hypermetabolic omental activity, hypermetabolic small retroperitoneal lymph nodes, hypermetabolic activity throughout uterus with increase in size.  · CT-guided omental biopsy 7/30/2019 with metastatic lobular carcinoma of breast primary, ER positive (greater than 95%), NM positive (90%), HER-2/tj negative (1+ IHC)  · Baseline CA 15-3 on 7/22/19 was 32.6  · Initiation of Aromasin 25 mg daily 8/12/2019.  Initiated Ibrance on 8/26/2019 at 100 mg 21/28 days on 8/26/2019.    · Response on CT scans 10/16/2019 following 2 cycles of Ibrance/Aromasin.  · Stable findings on subsequent CT chest abdomen pelvis 12/11/2019.  Further improvement in CA 15-3-23.9 on 12/18/2019.  Ibrance/Aromasin continued.    · Foundation medicine liquid biopsy 2/5/2020: MSI undetermined, mutations in BETH, NF1, CHEK2.  No evidence of PIK3CA mutation.  2. Anemia:  · Normocytic anemia, hemoglobin in 10-11 range  · Anemia evaluation 7/22/2019 with iron 30, ferritin 85.2, iron saturation 10%, TIBC 311, B12 370, erythropoietin level 20.4  · Anemia felt in large part to be related to CKD3 as well as to underlying malignancy  · Evidence of folate deficiency 8/12/2019 with level 3.84, initiated folic acid 1 mg daily  · If hemoglobin consistently below 10 consider use of Procrit  · Decline in hemoglobin into the 8 range, iron studies 7/20/2020 with iron 54, ferritin 66.7, iron saturation 16%, TIBC 328.  On 8/7/2020 proceeded with Injectafer 750 mg IV x1 dose.  Second dose not administered due to onset of fever, subsequent iron studies precluded further administration of IV iron.  · Initiated Procrit 20,000 units every 4 weeks on 9/4/2020.      HISTORY OF PRESENT ILLNESS:  The patient is a 59 y.o. year old female who is here  for follow-up with the above-mentioned history.    History of Present Illness   patient returns today in follow-up continuing on Aromasin 25 mg daily and Ibrance 100 mg daily for 21/28 days.  She reports that her last dose of Ibrance for this cycle will be tomorrow.  She will then have 1 week off treatment.  She notes no significant side effects from the most recent cycle of treatment.  The patient did receive 1 dose of Injectafer 750 mg IV on 8/8/2020 however when she came in for the second dose she had a fever of 102.3.  She did not receive treatment and underwent COVID-19 testing which was fortunately negative on 8/15/2020.  Patient reports that she had short duration symptoms of fatigue, chills, cough, shortness of breath.  By the time she went to urgent care for the COVID-19 testing on 8/15/2020 the symptoms had resolved spontaneously and she has had no further issues.  She did not receive the second dose of Injectafer and had labs drawn on 8/21/2020 showing iron 57, ferritin 353, iron saturation 19% which precluded further administration of Injectafer.  Patient reports that she has not experienced any abdominal pain.  She has not experienced any recent nausea and appetite has been normal.  Bowel function has been normal.  She has been maintaining adequate fluid intake.  She denies any evidence of GI blood loss.    Past Medical History:   Diagnosis Date   • Anemia    • Anxiety and depression    • Bicuspid aortic valve    • Breast cancer (CMS/HCC) 2004    RIGHT, HAD NEELA. MASTECTOMY, CHEMO AND RADIATION   • CKD (chronic kidney disease)    • Diabetes mellitus (CMS/HCC)    • Frequent UTI    • Heart murmur    • History of kidney stones    • History of MRSA infection 2005    POST BREAST SURGERY-TREATED [facility]   • History of sepsis 2010    KIDNEY STONE   • Hyperlipidemia    • Hypertension    • Hyperthyroidism    • Hypothyroidism    • Lymphedema of leg    • Metastasis from breast cancer (CMS/HCC)     STOMACH-OMENTUM   •  Neuromuscular disorder (CMS/HCC)    • Obesity    • ANDREW on CPAP    • Osteoarthrosis    • Radiculopathy    • Thickened endometrium    • Tremor      Past Surgical History:   Procedure Laterality Date   • BREAST RECONSTRUCTION      WITH IMPLANTS, AND REVISION, NOW REMOVED   • BREAST SURGERY     •  SECTION  1987   •  SECTION  1987   • D&C HYSTEROSCOPY N/A 2017    Procedure: DILATATION AND CURETTAGE, HYSTEROSCOPY ;  Surgeon: Cherie Oliveros MD;  Location: [facility] OR [facility] [place];  Service:    • D&C HYSTEROSCOPY N/A 2019    Procedure: DILATATION AND CURETTAGE HYSTEROSCOPY/POLYPECTOMY;  Surgeon: Cherie Oliveros MD;  Location: [facility] OR [facility] [place];  Service: Gynecology   • LYMPH NODE BIOPSY     • MASTECTOMY Bilateral 2003   • MASTECTOMY Bilateral          ONCOLOGIC HISTORY:  (History from previous dates can be found in the separate document.)    Current Outpatient Medications on File Prior to Visit   Medication Sig Dispense Refill   • amLODIPine (NORVASC) 10 MG tablet Take 10 mg by mouth Every Morning.     • aspirin 81 MG EC tablet Take 81 mg by mouth Daily. HELD FOR SURGERY     • atorvastatin (LIPITOR) 40 MG tablet Take 1 tablet by mouth Daily. 90 tablet 1   • carvedilol (COREG) 3.125 MG tablet TAKE ONE TABLET BY MOUTH TWICE A DAY 60 tablet 2   • Dulaglutide 1.5 MG/0.5ML solution pen-injector Inject 1.5 mg under the skin into the appropriate area as directed 1 (One) Time Per Week.  pen 1   • DULoxetine (CYMBALTA) 60 MG capsule      • exemestane (AROMASIN) 25 MG chemo tablet TAKE ONE TABLET BY MOUTH DAILY 30 tablet 3   • folic acid (FOLVITE) 1 MG tablet TAKE ONE TABLET BY MOUTH DAILY 90 tablet 0   • gabapentin (NEURONTIN) 300 MG capsule Take 2 capsules by mouth 3 (Three) Times a Day. 540 capsule 1   • LEVEMIR FLEXTOUCH 100 UNIT/ML injection Inject 50 Units under the skin into the appropriate area as directed 2 (Two) Times a Day. 3 pen 5   • levothyroxine (SYNTHROID,  LEVOTHROID) 50 MCG tablet Take 1 tablet by mouth Daily. 90 tablet 1   • ondansetron (Zofran) 8 MG tablet Take 1 tablet by mouth Every 8 (Eight) Hours As Needed for Nausea or Vomiting. 30 tablet 2   • Palbociclib (Ibrance) 100 MG capsule capsule TAKE 1 CAPSULE BY MOUTH DAILY FOR 21 DAYS ON THEN 7 DAYS OFF 21 capsule 5   • repaglinide (PRANDIN) 2 MG tablet Take 1 tablet by mouth 3 (Three) Times a Day Before Meals. 270 tablet 1   • vitamin D (ERGOCALCIFEROL) 83309 units capsule capsule 50,000 Units Every 7 (Seven) Days.       No current facility-administered medications on file prior to visit.        ALLERGIES:   No Known Allergies    Social History     Socioeconomic History   • Marital status:      Spouse name: Not on file   • Number of children: Not on file   • Years of education: Not on file   • Highest education level: Not on file   Tobacco Use   • Smoking status: Never Smoker   • Smokeless tobacco: Never Used   Substance and Sexual Activity   • Alcohol use: No   • Drug use: No   • Sexual activity: Yes     Partners: Male     Birth control/protection: Post-menopausal     Family History   Problem Relation Age of Onset   • Coronary artery disease Mother         Mother  from MI at 72   • Diabetes Mother    • Breast cancer Mother    • Hyperlipidemia Mother    • Arthritis Mother    • Cancer Mother    • Aortic aneurysm Father         Father with ruptured thoracic aortic aneurysm   • Coronary artery disease Father         Father  from MI at 74   • Heart disease Father    • Hyperlipidemia Father    • Arthritis Father    • Coronary artery disease Maternal Grandmother         MGM deceeased from MI at age 76   • Malig Hyperthermia Neg Hx               Review of Systems   Constitutional: Positive for fatigue. Negative for activity change, appetite change, fever and unexpected weight change.   HENT: Negative for congestion, mouth sores, nosebleeds, sore throat and voice change.    Respiratory: Positive for  shortness of breath. Negative for cough and wheezing.    Cardiovascular: Negative for chest pain, palpitations and leg swelling.   Gastrointestinal: Negative for abdominal distention, abdominal pain, blood in stool, constipation, diarrhea, nausea and vomiting.   Endocrine: Negative for cold intolerance and heat intolerance.   Genitourinary: Negative for difficulty urinating, dysuria, frequency and hematuria.   Musculoskeletal: Negative for arthralgias, back pain, joint swelling and myalgias.   Skin: Negative for rash.   Neurological: Positive for numbness. Negative for dizziness, syncope, weakness, light-headedness and headaches.   Hematological: Negative for adenopathy. Does not bruise/bleed easily.   Psychiatric/Behavioral: Negative for confusion and sleep disturbance. The patient is not nervous/anxious.          Objective      Vitals:    09/04/20 1438   BP: 123/59   Pulse: 80   Resp: 18   Temp: 98.2 °F (36.8 °C)   SpO2: (!) 89%      Current Status 9/4/2020   ECOG score 0   Pain 0/10    Physical Exam   Constitutional: She is oriented to person, place, and time. She appears well-developed and well-nourished.   Patient does ambulate with the use of a rolling walker.   HENT:   Mouth/Throat: Oropharynx is clear and moist.   Eyes: Conjunctivae are normal.   Neck: No thyromegaly present.   Cardiovascular: Normal rate and regular rhythm. Exam reveals no gallop and no friction rub.   Murmur heard.  Pulmonary/Chest: Breath sounds normal. No respiratory distress.   Abdominal: Soft. Bowel sounds are normal. She exhibits no distension. There is no tenderness.   Musculoskeletal: She exhibits edema.   Trace to 1+ chronic bilateral lower extremity edema with venous stasis changes   Lymphadenopathy:        Head (right side): No submandibular adenopathy present.     She has no cervical adenopathy.     She has no axillary adenopathy.        Right: No inguinal and no supraclavicular adenopathy present.        Left: No inguinal and  no supraclavicular adenopathy present.   Neurological: She is alert and oriented to person, place, and time. She displays normal reflexes. No cranial nerve deficit. She exhibits normal muscle tone.   Skin: Skin is warm and dry. No rash noted.   Psychiatric: She has a normal mood and affect. Her behavior is normal.   The patient was examined today, unchanged from above.    RECENT LABS:  Hematology WBC   Date Value Ref Range Status   09/04/2020 2.24 (L) 3.40 - 10.80 10*3/mm3 Final   03/16/2019 7.6 3.4 - 10.8 x10E3/uL Final     RBC   Date Value Ref Range Status   09/04/2020 2.79 (L) 3.77 - 5.28 10*6/mm3 Final   03/16/2019 3.80 3.77 - 5.28 x10E6/uL Final     Hemoglobin   Date Value Ref Range Status   09/04/2020 9.1 (L) 12.0 - 15.9 g/dL Final     Hematocrit   Date Value Ref Range Status   09/04/2020 29.0 (L) 34.0 - 46.6 % Final     Platelets   Date Value Ref Range Status   09/04/2020 164 140 - 450 10*3/mm3 Final        Lab Results   Component Value Date    GLUCOSE 244 (H) 09/04/2020    BUN 30 (H) 09/04/2020    CREATININE 2.50 (C) 09/04/2020    EGFRIFNONA 20 (L) 09/04/2020    EGFRIFAFRI 35 (L) 03/16/2019    BCR 12.0 09/04/2020    K 5.0 (H) 09/04/2020    CO2 25.4 09/04/2020    CALCIUM 9.0 09/04/2020    PROTENTOTREF 7.0 03/16/2019    ALBUMIN 3.90 09/04/2020    LABIL2 1.2 03/16/2019    AST 12 09/04/2020    ALT 6 09/04/2020     Additional labs reviewed as outlined below    Assessment/Plan    1. Metastatic lobular breast cancer (ER/ME positive, HER-2/tj negative):  · History of stage IIIC right breast cancer ([id number])  · Patient treated previously in the [facility]  · Diagnosis via excisional biopsy 8/23/2003 with lobular carcinoma, 4.5 cm, positive margins.  ER/ME positive, HER-2/tj negative.  · Staging evaluation in September 2003 with negative bone scan and negative CT scans.  Ejection fraction 65%.  · Received neoadjuvant chemotherapy (? GET regimen) which apparently included Taxotere and possibly epirubicin.   Received 6 cycles.  · 1/22/2004 bilateral mastectomy with right axillary dissection.  Per available records, 10-12 cm residual invasive lobular carcinoma in the breast with 14/16 lymph nodes positive with extranodal extension.  Immediate reconstruction.  · Received adjuvant chemotherapy with carboplatin and navelbine x4 cycles  · Initiated adjuvant tamoxifen 6/22/2004  · Adjuvant radiation therapy initiated but interrupted due to chest wall cellulitis requiring removal of implants in August 2004  · Implant reconstruction again attempted with MRSA infection, implant removed 12/30/2005 with no further attempts made and reconstruction.  · Completed 5 years tamoxifen in June 2009 and subsequently transitioned to Femara.  Received Femara until June 2014.  · Patient experienced postmenopausal vaginal bleeding in 2013, negative endometrial biopsy and gynecologic evaluation.  · Patient last seen in [facility] 6/18/2014  · CT chest performed to evaluate bicuspid aortic valve and dilated asending aorta 7/12/2019.  CT showed no change in aorta however showed new free intraperitoneal fluid in the upper abdomen with mesenteric edema, concerning for carcinomatosis.  · CT abdomen and pelvis (without IV contrast) on 7/16/2019 with mesenteric thickening, small volume of complex fluid in the mesentery which was suspicious and possibly representative of carcinomatosis, small amount of perihepatic fluid, small bowel loops tethered to omentum without obstruction, omental thickening, shotty retroperitoneal and pelvic lymph nodes (non-pathologically enlarged).  · PET scan 7/26/2019 with hypermetabolic omental activity, hypermetabolic small retroperitoneal lymph nodes, hypermetabolic activity throughout uterus with increase in size.  · CT-guided omental biopsy 7/30/2019 with metastatic lobular carcinoma of breast primary, ER positive (greater than 95%), ND positive (90%), HER-2/tj negative (1+ IHC)  · Baseline CA 15-3 on 7/22/19 was  32.6  · Initiation of Aromasin 25 mg daily 8/12/2019.  Initiated Ibrance at 100 mg 21/28 days on 8/26/2019.  · Improvement in CA 15-3 on 9/17/19-26.3  · Following 2 cycles of Aromasin and Ibrance, CA 15-3 declined to 25.9 on 10/15/2019.  CT scan chest abdomen pelvis 10/16/2019 showed improvement in the mesenteric and omental thickening and near resolution of complex ascites.  Treatment continued.  · Stable findings on subsequent CT chest abdomen pelvis 12/11/2019.  Further improvement in CA 15-3-23.9 on 12/18/2019.  Ibrance/Aromasin continued.   · Foundation medicine liquid biopsy 2/5/2020: MSI undetermined, mutations in BETH, NF1, CHEK2.  No evidence of PIK3CA mutation.  · Slight increase in CA 15-3 to 27.1 on 2/19/2020 and 29.1 on 3/18/2020.  CT however on 3/13/2020 with no new findings.  · Subsequent improvement in CA 15-3-26.5 on 5/15/2020  · CT 7/31/2020 showed evidence of stable disease.  CA 15-3 declined further to 23.1 on 7/24/2020.  · Patient returns today in follow-up continuing on Aromasin 25 mg daily and Ibrance 100 mg daily for 21/28 days (due to receive her last dose of Ibrance for this cycle tomorrow).  She is tolerating treatment extremely well.  Over the past 4 weeks she reports no significant side effects.  She will continue on with treatment as planned.  We discussed interval for follow-up scans and I believe at this point we could lengthen out to 3-month interval (2 months from now).  We will repeat CA 15-3 when she returns in 4 weeks for nurse practitioner visit.  In 7 weeks she will undergo CT and I will see her in 8 weeks again for follow-up.  2. Normocytic anemia in the setting of CKD3:  · The patient appears to have a chronic anemia with hemoglobin in the 10-11 range with normal MCV.  · Patient has underlying CKD3 with baseline creatinine recently in the 1.5-1.8 range.  · Anemia evaluation 7/22/2019 with iron 30, ferritin 85.2, iron saturation 10%, TIBC 311, B12 370, erythropoietin level  20.4  · Anemia felt in large part to be related to CKD3 as well as to underlying malignancy  · Evidence of folate deficiency 8/12/2019 with level 3.84, initiated folic acid 1 mg daily  · Additional labs on 12/18/2019 with iron 73, ferritin 82.2, iron saturation 25%, TIBC 290.  We did discuss the potential use of Procrit if her hemoglobin declines into the low 9/upper 8 range.  · Decline in hemoglobin into the 8 range, iron studies 7/20/2020 with iron 54, ferritin 66.7, iron saturation 16%, TIBC 328.  On 8/7/2020 proceeded with Injectafer 750 mg IV x1 dose.  Second dose not administered due to onset of fever, subsequent iron studies precluded further administration of IV iron.  · Initiated Procrit 20,000 units every 4 weeks on 9/4/2020.  · Patient returns today in follow-up having received only 1 dose of Injectafer on 8/8/2020, 750 mg IV.  She had a fever when she returned for her second dose, fortunately was COVID-19 negative and had no persistence of any infectious symptoms.  Nevertheless labs on 8/21/2020 showed iron 57, ferritin 353.9, iron saturation 19%, TIBC 297 and she did not qualify for further administration of IV iron.  Today, hemoglobin has improved minimally up to 9.1.  We did discuss proceeding on with Procrit today at 20,000 units and we will dose her every 4 weeks for now.  We are trying to maintain a hemoglobin in the 9-10 range.  Patient does agree to proceed today.  3. CKD3:  · Baseline creatinine recently in 1.6-2.0 range  · On 9/10/2019, RYAN/CKD3 with creatinine up to 2.44, BUN of 40.  Received 1 L normal saline.  Patient with increase in oral fluid intake.  · Renal ultrasound 9/20/2019 with no evidence of obstructive uropathy.  · Diminished oral intake due to nausea from Ibrance, creatinine 2.79 with BUN 43 on 10/15/2019.  Received 1 L normal saline.  Repeat labs on 10/18/2019 with BUN 31, creatinine 2.0.  · During cycle 3 Ibrance, patient developed increase in creatinine on 11/6/2019 to 2.35  and received 1 L normal saline.  · Patient is trying to maintain adequate oral hydration.    · It appears at this point that her new baseline creatinine is in the 2-2.4 range.    · Creatinine today 2.5  4. CHF with mild to moderate aortic stenosis:  · Recent cardiology evaluation with echocardiogram 7/12/2019 showing ejection fraction 48% with moderate dilation of the LV cavity, global hypokinesis, grade 2 diastolic dysfunction, mild to moderate aortic stenosis, trivial pericardial effusion.  · CT chest 7/12/2019 with no change in the aorta compared to prior study 12/13/2017.  5. Left upper and bilateral lower extremity peripheral neuropathy:  · Patient reports that left upper extremity neuropathy was not present from previous chemotherapy but occurred after hospitalization that required intubation and critical care stay.  Apparently felt to represent critical care neuropathy.  Improved over time.  · Bilateral lower extremity neuropathy felt to be related to diabetes  · May have future implications regarding treatment for breast cancer.  · The patient is currently continuing on gabapentin 600 mg 3 times daily.  6. Uterine/endometrial activity on PET scan:  · Patient experienced postmenopausal vaginal bleeding in 2013, negative endometrial biopsy and gynecologic evaluation.  · With findings on PET scan with enlarging uterus and some hypermetabolic activity, referred back to Dr. Oliveros and gynecology.    · 9/19/2019 D&C with hysteroscopy by Dr. Oliveros, no significant intraoperative findings, pathologic results with benign findings, no evidence of malignancy.  7. Nausea:  · Mild, relieved with Zofran.    8. Myelosuppression secondary to Ibrance:  · With cycle 1, jessica WBC 2.49 with ANC 1.25 and platelet count 140,000.  · With cycle 2, jessica WBC 2.33 with ANC 1.06, maintained normal platelet count  · Today, WBC 2.24 with ANC 1.27, platelet count 164,000.  Patient is currently at the end of her 3-week course of  Ibrance.     Plan:  1. Continue Aromasin 25 mg daily.    2. Continue Ibrance 100 mg daily for 21/28 days (due for her last dose Ibrance this cycle tomorrow)  3. Continue oral folic acid 1 mg daily  4. Proceed with Procrit 20,000 units today intending to be dosed every 4 weeks  5. Nurse practitioner visit in 4 weeks with CBC, CMP, CA 15-3.  Patient will be due for Procrit.  6. In 7 weeks CT chest abdomen pelvis  7. MD visit in 8 weeks with CBC, CMP, iron panel, ferritin and will be due for Procrit.    Patient continues on high risk medication requiring intensive monitoring.

## 2020-09-04 ENCOUNTER — OFFICE VISIT (OUTPATIENT)
Dept: ONCOLOGY | Facility: CLINIC | Age: 60
End: 2020-09-04

## 2020-09-04 ENCOUNTER — INFUSION (OUTPATIENT)
Dept: ONCOLOGY | Facility: HOSPITAL | Age: 60
End: 2020-09-04

## 2020-09-04 ENCOUNTER — LAB (OUTPATIENT)
Dept: LAB | Facility: HOSPITAL | Age: 60
End: 2020-09-04

## 2020-09-04 VITALS
HEART RATE: 80 BPM | DIASTOLIC BLOOD PRESSURE: 59 MMHG | OXYGEN SATURATION: 89 % | SYSTOLIC BLOOD PRESSURE: 123 MMHG | BODY MASS INDEX: 44.41 KG/M2 | RESPIRATION RATE: 18 BRPM | TEMPERATURE: 98.2 F | HEIGHT: 68 IN | WEIGHT: 293 LBS

## 2020-09-04 DIAGNOSIS — D64.9 NORMOCYTIC ANEMIA: ICD-10-CM

## 2020-09-04 DIAGNOSIS — D63.1 ANEMIA, CHRONIC RENAL FAILURE, STAGE 3 (MODERATE) (HCC): ICD-10-CM

## 2020-09-04 DIAGNOSIS — C50.919 METASTATIC BREAST CANCER: ICD-10-CM

## 2020-09-04 DIAGNOSIS — D50.8 OTHER IRON DEFICIENCY ANEMIA: ICD-10-CM

## 2020-09-04 DIAGNOSIS — D50.8 OTHER IRON DEFICIENCY ANEMIA: Primary | ICD-10-CM

## 2020-09-04 DIAGNOSIS — D63.1 ANEMIA IN STAGE 3 CHRONIC KIDNEY DISEASE (HCC): ICD-10-CM

## 2020-09-04 DIAGNOSIS — C50.919 METASTATIC BREAST CANCER: Primary | ICD-10-CM

## 2020-09-04 DIAGNOSIS — N18.30 ANEMIA IN STAGE 3 CHRONIC KIDNEY DISEASE (HCC): ICD-10-CM

## 2020-09-04 DIAGNOSIS — N18.30 ANEMIA, CHRONIC RENAL FAILURE, STAGE 3 (MODERATE) (HCC): ICD-10-CM

## 2020-09-04 LAB
ALBUMIN SERPL-MCNC: 3.9 G/DL (ref 3.5–5.2)
ALBUMIN/GLOB SERPL: 1.1 G/DL (ref 1.1–2.4)
ALP SERPL-CCNC: 72 U/L (ref 38–116)
ALT SERPL W P-5'-P-CCNC: 6 U/L (ref 0–33)
ANION GAP SERPL CALCULATED.3IONS-SCNC: 9.6 MMOL/L (ref 5–15)
AST SERPL-CCNC: 12 U/L (ref 0–32)
BASOPHILS # BLD AUTO: 0.06 10*3/MM3 (ref 0–0.2)
BASOPHILS NFR BLD AUTO: 2.7 % (ref 0–1.5)
BILIRUB SERPL-MCNC: 0.7 MG/DL (ref 0.2–1.2)
BUN SERPL-MCNC: 30 MG/DL (ref 6–20)
BUN/CREAT SERPL: 12 (ref 7.3–30)
CALCIUM SPEC-SCNC: 9 MG/DL (ref 8.5–10.2)
CHLORIDE SERPL-SCNC: 105 MMOL/L (ref 98–107)
CO2 SERPL-SCNC: 25.4 MMOL/L (ref 22–29)
CREAT SERPL-MCNC: 2.5 MG/DL (ref 0.6–1.1)
DEPRECATED RDW RBC AUTO: 63.2 FL (ref 37–54)
EOSINOPHIL # BLD AUTO: 0.07 10*3/MM3 (ref 0–0.4)
EOSINOPHIL NFR BLD AUTO: 3.1 % (ref 0.3–6.2)
ERYTHROCYTE [DISTWIDTH] IN BLOOD BY AUTOMATED COUNT: 16.5 % (ref 12.3–15.4)
GFR SERPL CREATININE-BSD FRML MDRD: 20 ML/MIN/1.73
GLOBULIN UR ELPH-MCNC: 3.6 GM/DL (ref 1.8–3.5)
GLUCOSE SERPL-MCNC: 244 MG/DL (ref 74–124)
HCT VFR BLD AUTO: 29 % (ref 34–46.6)
HGB BLD-MCNC: 9.1 G/DL (ref 12–15.9)
IMM GRANULOCYTES # BLD AUTO: 0.01 10*3/MM3 (ref 0–0.05)
IMM GRANULOCYTES NFR BLD AUTO: 0.4 % (ref 0–0.5)
LYMPHOCYTES # BLD AUTO: 0.66 10*3/MM3 (ref 0.7–3.1)
LYMPHOCYTES NFR BLD AUTO: 29.5 % (ref 19.6–45.3)
MCH RBC QN AUTO: 32.6 PG (ref 26.6–33)
MCHC RBC AUTO-ENTMCNC: 31.4 G/DL (ref 31.5–35.7)
MCV RBC AUTO: 103.9 FL (ref 79–97)
MONOCYTES # BLD AUTO: 0.17 10*3/MM3 (ref 0.1–0.9)
MONOCYTES NFR BLD AUTO: 7.6 % (ref 5–12)
NEUTROPHILS NFR BLD AUTO: 1.27 10*3/MM3 (ref 1.7–7)
NEUTROPHILS NFR BLD AUTO: 56.7 % (ref 42.7–76)
NRBC BLD AUTO-RTO: 0 /100 WBC (ref 0–0.2)
PLATELET # BLD AUTO: 164 10*3/MM3 (ref 140–450)
PMV BLD AUTO: 8.9 FL (ref 6–12)
POTASSIUM SERPL-SCNC: 5 MMOL/L (ref 3.5–4.7)
PROT SERPL-MCNC: 7.5 G/DL (ref 6.3–8)
RBC # BLD AUTO: 2.79 10*6/MM3 (ref 3.77–5.28)
SODIUM SERPL-SCNC: 140 MMOL/L (ref 134–145)
WBC # BLD AUTO: 2.24 10*3/MM3 (ref 3.4–10.8)

## 2020-09-04 PROCEDURE — 85025 COMPLETE CBC W/AUTO DIFF WBC: CPT

## 2020-09-04 PROCEDURE — 25010000002 EPOETIN ALFA PER 1000 UNITS: Performed by: INTERNAL MEDICINE

## 2020-09-04 PROCEDURE — 96372 THER/PROPH/DIAG INJ SC/IM: CPT

## 2020-09-04 PROCEDURE — 80053 COMPREHEN METABOLIC PANEL: CPT

## 2020-09-04 PROCEDURE — 99214 OFFICE O/P EST MOD 30 MIN: CPT | Performed by: INTERNAL MEDICINE

## 2020-09-04 PROCEDURE — 36415 COLL VENOUS BLD VENIPUNCTURE: CPT

## 2020-09-04 RX ADMIN — ERYTHROPOIETIN 20000 UNITS: 20000 INJECTION, SOLUTION INTRAVENOUS; SUBCUTANEOUS at 15:21

## 2020-09-07 DIAGNOSIS — Z79.4 CONTROLLED TYPE 2 DIABETES MELLITUS WITHOUT COMPLICATION, WITH LONG-TERM CURRENT USE OF INSULIN (HCC): ICD-10-CM

## 2020-09-07 DIAGNOSIS — G62.89 OTHER POLYNEUROPATHY: ICD-10-CM

## 2020-09-07 DIAGNOSIS — E11.9 CONTROLLED TYPE 2 DIABETES MELLITUS WITHOUT COMPLICATION, WITH LONG-TERM CURRENT USE OF INSULIN (HCC): ICD-10-CM

## 2020-09-08 ENCOUNTER — MEDICATION THERAPY MANAGEMENT (OUTPATIENT)
Dept: PHARMACY | Facility: HOSPITAL | Age: 60
End: 2020-09-08

## 2020-09-08 RX ORDER — GABAPENTIN 300 MG/1
CAPSULE ORAL
Qty: 540 CAPSULE | Refills: 0 | OUTPATIENT
Start: 2020-09-08

## 2020-09-08 RX ORDER — EXEMESTANE 25 MG/1
TABLET ORAL
Qty: 90 TABLET | Refills: 1 | Status: SHIPPED | OUTPATIENT
Start: 2020-09-08 | End: 2021-03-15

## 2020-09-08 RX ORDER — DULAGLUTIDE 1.5 MG/.5ML
INJECTION, SOLUTION SUBCUTANEOUS
Refills: 0 | OUTPATIENT
Start: 2020-09-08

## 2020-09-08 NOTE — PROGRESS NOTES
MTM telephone follow up re adherence and side effects- Ibrance + aromasin    Juana is doing well today. She has no new issues or side effects with Ibrance. Medication administration and adherence seem appropriate; she reports no missed doses this past month. She denies any recent medication changes beside the addition of Procrit. Juana has no additional questions or concerns for me today. Pharmacy will continue to follow.       9/4/2020   WBC 3.40 - 10.80 10*3/mm3 2.24 (A)   Neutrophils Absolute 1.70 - 7.00 10*3/mm3 1.27 (A)   Hemoglobin 12.0 - 15.9 g/dL 9.1 (A)   Hematocrit 34.0 - 46.6 % 29.0 (A)   Platelets 140 - 450 10*3/mm3 164   Creatinine 0.60 - 1.10 mg/dL 2.50 (A)   eGFR Non African Am >60 mL/min/1.73 20 (A)   BUN 6 - 20 mg/dL 30 (A)   Sodium 134 - 145 mmol/L 140   Potassium 3.5 - 4.7 mmol/L 5.0 (A)   Glucose 74 - 124 mg/dL 244 (A)   Calcium 8.5 - 10.2 mg/dL 9.0   Albumin 3.50 - 5.20 g/dL 3.90   Total Protein 6.3 - 8.0 g/dL 7.5   AST (SGOT) 0 - 32 U/L 12   ALT (SGPT) 0 - 33 U/L 6   Alkaline Phosphatase 38 - 116 U/L 72   Total Bilirubin 0.2 - 1.2 mg/dL 0.7     Thanks,   Myah Argueta, PharmD

## 2020-09-09 DIAGNOSIS — G62.89 OTHER POLYNEUROPATHY: ICD-10-CM

## 2020-09-09 RX ORDER — AMOXICILLIN 500 MG/1
CAPSULE ORAL
Qty: 4 CAPSULE | Refills: 0 | Status: SHIPPED | OUTPATIENT
Start: 2020-09-09 | End: 2021-01-11 | Stop reason: SDUPTHER

## 2020-09-10 RX ORDER — GABAPENTIN 300 MG/1
CAPSULE ORAL
Qty: 540 CAPSULE | Refills: 0 | OUTPATIENT
Start: 2020-09-10

## 2020-09-18 ENCOUNTER — OFFICE VISIT (OUTPATIENT)
Dept: FAMILY MEDICINE CLINIC | Facility: CLINIC | Age: 60
End: 2020-09-18

## 2020-09-18 VITALS
TEMPERATURE: 97.3 F | SYSTOLIC BLOOD PRESSURE: 150 MMHG | HEIGHT: 68 IN | RESPIRATION RATE: 20 BRPM | DIASTOLIC BLOOD PRESSURE: 90 MMHG | BODY MASS INDEX: 44.41 KG/M2 | WEIGHT: 293 LBS | OXYGEN SATURATION: 98 % | HEART RATE: 75 BPM

## 2020-09-18 DIAGNOSIS — E11.9 CONTROLLED TYPE 2 DIABETES MELLITUS WITHOUT COMPLICATION, WITH LONG-TERM CURRENT USE OF INSULIN (HCC): ICD-10-CM

## 2020-09-18 DIAGNOSIS — E11.43 TYPE 2 DIABETES MELLITUS WITH DIABETIC AUTONOMIC NEUROPATHY, WITHOUT LONG-TERM CURRENT USE OF INSULIN (HCC): ICD-10-CM

## 2020-09-18 DIAGNOSIS — E03.9 ACQUIRED HYPOTHYROIDISM: Primary | ICD-10-CM

## 2020-09-18 DIAGNOSIS — E78.2 MIXED HYPERLIPIDEMIA: ICD-10-CM

## 2020-09-18 DIAGNOSIS — Z79.4 CONTROLLED TYPE 2 DIABETES MELLITUS WITHOUT COMPLICATION, WITH LONG-TERM CURRENT USE OF INSULIN (HCC): ICD-10-CM

## 2020-09-18 DIAGNOSIS — G62.89 OTHER POLYNEUROPATHY: ICD-10-CM

## 2020-09-18 PROCEDURE — 90471 IMMUNIZATION ADMIN: CPT | Performed by: NURSE PRACTITIONER

## 2020-09-18 PROCEDURE — 90732 PPSV23 VACC 2 YRS+ SUBQ/IM: CPT | Performed by: NURSE PRACTITIONER

## 2020-09-18 PROCEDURE — 90472 IMMUNIZATION ADMIN EACH ADD: CPT | Performed by: NURSE PRACTITIONER

## 2020-09-18 PROCEDURE — 90686 IIV4 VACC NO PRSV 0.5 ML IM: CPT | Performed by: NURSE PRACTITIONER

## 2020-09-18 PROCEDURE — 99214 OFFICE O/P EST MOD 30 MIN: CPT | Performed by: NURSE PRACTITIONER

## 2020-09-18 RX ORDER — ATORVASTATIN CALCIUM 40 MG/1
40 TABLET, FILM COATED ORAL DAILY
Qty: 90 TABLET | Refills: 1 | Status: SHIPPED | OUTPATIENT
Start: 2020-09-18 | End: 2021-04-15 | Stop reason: SDUPTHER

## 2020-09-18 RX ORDER — GABAPENTIN 300 MG/1
600 CAPSULE ORAL 3 TIMES DAILY
Qty: 540 CAPSULE | Refills: 1 | Status: SHIPPED | OUTPATIENT
Start: 2020-09-18 | End: 2020-10-02

## 2020-09-18 RX ORDER — REPAGLINIDE 2 MG/1
2 TABLET ORAL
Qty: 270 TABLET | Refills: 1 | Status: SHIPPED | OUTPATIENT
Start: 2020-09-18 | End: 2021-07-07

## 2020-09-18 RX ORDER — INSULIN DETEMIR 100 [IU]/ML
50 INJECTION, SOLUTION SUBCUTANEOUS 2 TIMES DAILY
Qty: 3 PEN | Refills: 5 | Status: SHIPPED | OUTPATIENT
Start: 2020-09-18 | End: 2021-04-15 | Stop reason: SDUPTHER

## 2020-09-18 RX ORDER — LEVOTHYROXINE SODIUM 0.05 MG/1
50 TABLET ORAL DAILY
Qty: 90 TABLET | Refills: 1 | Status: SHIPPED | OUTPATIENT
Start: 2020-09-18 | End: 2021-03-01

## 2020-09-18 NOTE — PROGRESS NOTES
"Subjective   Juana Hall is a 59 y.o. female.     History of Present Illness    Since the last visit, she has overall felt well.  She has Essential Hypertension and well controlled on current medication, DMII well controlled on medication and will continue regimen, Hyperlipidemia with goals met with current Rx and Hypothyroidism well controlled on current medication and will continue Rx.  she has been compliant with current medications have reviewed them.  The patient denies medication side effects.  Will refill medications. /90   Pulse 75   Temp 97.3 °F (36.3 °C)   Resp 20   Ht 172.7 cm (67.99\")   Wt (!) 186 kg (409 lb)   LMP  (LMP Unknown)   SpO2 98%   BMI 62.21 kg/m²     Results for orders placed or performed in visit on 09/04/20   Comprehensive Metabolic Panel    Specimen: Blood   Result Value Ref Range    Glucose 244 (H) 74 - 124 mg/dL    BUN 30 (H) 6 - 20 mg/dL    Creatinine 2.50 (C) 0.60 - 1.10 mg/dL    Sodium 140 134 - 145 mmol/L    Potassium 5.0 (H) 3.5 - 4.7 mmol/L    Chloride 105 98 - 107 mmol/L    CO2 25.4 22.0 - 29.0 mmol/L    Calcium 9.0 8.5 - 10.2 mg/dL    Total Protein 7.5 6.3 - 8.0 g/dL    Albumin 3.90 3.50 - 5.20 g/dL    ALT (SGPT) 6 0 - 33 U/L    AST (SGOT) 12 0 - 32 U/L    Alkaline Phosphatase 72 38 - 116 U/L    Total Bilirubin 0.7 0.2 - 1.2 mg/dL    eGFR Non African Amer 20 (L) >60 mL/min/1.73    Globulin 3.6 (H) 1.8 - 3.5 gm/dL    A/G Ratio 1.1 1.1 - 2.4 g/dL    BUN/Creatinine Ratio 12.0 7.3 - 30.0    Anion Gap 9.6 5.0 - 15.0 mmol/L   CBC Auto Differential    Specimen: Blood   Result Value Ref Range    WBC 2.24 (L) 3.40 - 10.80 10*3/mm3    RBC 2.79 (L) 3.77 - 5.28 10*6/mm3    Hemoglobin 9.1 (L) 12.0 - 15.9 g/dL    Hematocrit 29.0 (L) 34.0 - 46.6 %    .9 (H) 79.0 - 97.0 fL    MCH 32.6 26.6 - 33.0 pg    MCHC 31.4 (L) 31.5 - 35.7 g/dL    RDW 16.5 (H) 12.3 - 15.4 %    RDW-SD 63.2 (H) 37.0 - 54.0 fl    MPV 8.9 6.0 - 12.0 fL    Platelets 164 140 - 450 10*3/mm3    Neutrophil % " 56.7 42.7 - 76.0 %    Lymphocyte % 29.5 19.6 - 45.3 %    Monocyte % 7.6 5.0 - 12.0 %    Eosinophil % 3.1 0.3 - 6.2 %    Basophil % 2.7 (H) 0.0 - 1.5 %    Immature Grans % 0.4 0.0 - 0.5 %    Neutrophils, Absolute 1.27 (L) 1.70 - 7.00 10*3/mm3    Lymphocytes, Absolute 0.66 (L) 0.70 - 3.10 10*3/mm3    Monocytes, Absolute 0.17 0.10 - 0.90 10*3/mm3    Eosinophils, Absolute 0.07 0.00 - 0.40 10*3/mm3    Basophils, Absolute 0.06 0.00 - 0.20 10*3/mm3    Immature Grans, Absolute 0.01 0.00 - 0.05 10*3/mm3    nRBC 0.0 0.0 - 0.2 /100 WBC     BP elevated today but has been running normal for patient.    She is UTD with Dr. Irvin for f/u of her metastatic breast cancer.  She has next f/u in 2 weeks.    She reports feeling fatigued and thinks the gabapentin is causing this.  She does not feel it has helped her neuropathy and would like to wean down.      She reports feeling well emotionally and denies any issues with depression or suicidal thoughts.        The following portions of the patient's history were reviewed and updated as appropriate: allergies, current medications, past family history, past medical history, past social history, past surgical history and problem list.    Review of Systems   Constitutional: Negative for unexpected weight change.   Respiratory: Negative for shortness of breath.    Cardiovascular: Negative for chest pain and palpitations.   Psychiatric/Behavioral: Negative for behavioral problems.       Objective   Physical Exam  Vitals signs and nursing note reviewed.   Constitutional:       Appearance: She is well-developed.   Cardiovascular:      Rate and Rhythm: Normal rate and regular rhythm.   Pulmonary:      Effort: Pulmonary effort is normal.      Breath sounds: Normal breath sounds.   Neurological:      Mental Status: She is alert and oriented to person, place, and time.   Psychiatric:         Mood and Affect: Mood normal.         Behavior: Behavior normal.         Thought Content: Thought content  normal.         Judgment: Judgment normal.         Assessment/Plan   Problems Addressed this Visit        Cardiovascular and Mediastinum    Hyperlipidemia    Relevant Medications    atorvastatin (LIPITOR) 40 MG tablet       Endocrine    Diabetes type 2, controlled (CMS/[facility]ly [facility])    Relevant Medications    repaglinide (PRANDIN) 2 MG tablet    Levemir FlexTouch 100 UNIT/ML injection    Dulaglutide 1.5 MG/0.5ML solution pen-injector    Acquired hypothyroidism - Primary    Relevant Medications    levothyroxine (SYNTHROID, LEVOTHROID) 50 MCG tablet    Other Relevant Orders    TSH    T4, free    Hemoglobin A1c    Lipid panel    MicroAlbumin, Urine, Random - Urine, Clean Catch       Nervous and Auditory    Peripheral neuropathy    Relevant Medications    gabapentin (NEURONTIN) 300 MG capsule      Other Visit Diagnoses     Type 2 diabetes mellitus with diabetic autonomic neuropathy, without long-term current use of insulin (CMS/[facility])        Relevant Medications    repaglinide (PRANDIN) 2 MG tablet    Levemir FlexTouch 100 UNIT/ML injection    Dulaglutide 1.5 MG/0.5ML solution pen-injector    Other Relevant Orders    TSH    T4, free    Hemoglobin A1c    Lipid panel    MicroAlbumin, Urine, Random - Urine, Clean Catch        Decrease gabapentin to 300 mg TID.        Patient denies ever having suicidal thoughts and is not sure why that was in her chart.

## 2020-09-25 LAB
CHOLEST SERPL-MCNC: 119 MG/DL (ref 100–199)
HBA1C MFR BLD: 5.9 % (ref 4.8–5.6)
HDLC SERPL-MCNC: 34 MG/DL
LDLC SERPL CALC-MCNC: 62 MG/DL (ref 0–99)
MICROALBUMIN UR-MCNC: 227.8 UG/ML
T4 FREE SERPL-MCNC: 1.33 NG/DL (ref 0.82–1.77)
TRIGL SERPL-MCNC: 127 MG/DL (ref 0–149)
TSH SERPL DL<=0.005 MIU/L-ACNC: 2.86 UIU/ML (ref 0.45–4.5)
VLDLC SERPL CALC-MCNC: 23 MG/DL (ref 5–40)

## 2020-10-02 ENCOUNTER — INFUSION (OUTPATIENT)
Dept: ONCOLOGY | Facility: HOSPITAL | Age: 60
End: 2020-10-02

## 2020-10-02 ENCOUNTER — OFFICE VISIT (OUTPATIENT)
Dept: ONCOLOGY | Facility: CLINIC | Age: 60
End: 2020-10-02

## 2020-10-02 ENCOUNTER — LAB (OUTPATIENT)
Dept: LAB | Facility: HOSPITAL | Age: 60
End: 2020-10-02

## 2020-10-02 VITALS
BODY MASS INDEX: 44.41 KG/M2 | RESPIRATION RATE: 18 BRPM | OXYGEN SATURATION: 94 % | WEIGHT: 293 LBS | SYSTOLIC BLOOD PRESSURE: 117 MMHG | HEIGHT: 68 IN | TEMPERATURE: 97.8 F | HEART RATE: 75 BPM | DIASTOLIC BLOOD PRESSURE: 60 MMHG

## 2020-10-02 DIAGNOSIS — N18.30 ANEMIA IN STAGE 3 CHRONIC KIDNEY DISEASE (HCC): ICD-10-CM

## 2020-10-02 DIAGNOSIS — D64.9 NORMOCYTIC ANEMIA: ICD-10-CM

## 2020-10-02 DIAGNOSIS — D50.8 OTHER IRON DEFICIENCY ANEMIA: Primary | ICD-10-CM

## 2020-10-02 DIAGNOSIS — C50.919 METASTATIC BREAST CANCER: ICD-10-CM

## 2020-10-02 DIAGNOSIS — C50.919 METASTATIC BREAST CANCER: Primary | ICD-10-CM

## 2020-10-02 DIAGNOSIS — D63.1 ANEMIA IN STAGE 3 CHRONIC KIDNEY DISEASE (HCC): ICD-10-CM

## 2020-10-02 DIAGNOSIS — D50.8 OTHER IRON DEFICIENCY ANEMIA: ICD-10-CM

## 2020-10-02 LAB
ALBUMIN SERPL-MCNC: 4.1 G/DL (ref 3.5–5.2)
ALBUMIN/GLOB SERPL: 1.1 G/DL (ref 1.1–2.4)
ALP SERPL-CCNC: 64 U/L (ref 38–116)
ALT SERPL W P-5'-P-CCNC: 8 U/L (ref 0–33)
ANION GAP SERPL CALCULATED.3IONS-SCNC: 12 MMOL/L (ref 5–15)
AST SERPL-CCNC: 12 U/L (ref 0–32)
BASOPHILS # BLD AUTO: 0.11 10*3/MM3 (ref 0–0.2)
BASOPHILS NFR BLD AUTO: 4.1 % (ref 0–1.5)
BILIRUB SERPL-MCNC: 0.8 MG/DL (ref 0.2–1.2)
BUN SERPL-MCNC: 35 MG/DL (ref 6–20)
BUN/CREAT SERPL: 13.8 (ref 7.3–30)
CALCIUM SPEC-SCNC: 9.3 MG/DL (ref 8.5–10.2)
CANCER AG15-3 SERPL-ACNC: 29.5 U/ML
CHLORIDE SERPL-SCNC: 106 MMOL/L (ref 98–107)
CO2 SERPL-SCNC: 26 MMOL/L (ref 22–29)
CREAT SERPL-MCNC: 2.54 MG/DL (ref 0.6–1.1)
DEPRECATED RDW RBC AUTO: 60.4 FL (ref 37–54)
EOSINOPHIL # BLD AUTO: 0.1 10*3/MM3 (ref 0–0.4)
EOSINOPHIL NFR BLD AUTO: 3.7 % (ref 0.3–6.2)
ERYTHROCYTE [DISTWIDTH] IN BLOOD BY AUTOMATED COUNT: 16.1 % (ref 12.3–15.4)
GFR SERPL CREATININE-BSD FRML MDRD: 19 ML/MIN/1.73
GLOBULIN UR ELPH-MCNC: 3.7 GM/DL (ref 1.8–3.5)
GLUCOSE SERPL-MCNC: 165 MG/DL (ref 74–124)
HCT VFR BLD AUTO: 30.5 % (ref 34–46.6)
HGB BLD-MCNC: 9.8 G/DL (ref 12–15.9)
IMM GRANULOCYTES # BLD AUTO: 0.01 10*3/MM3 (ref 0–0.05)
IMM GRANULOCYTES NFR BLD AUTO: 0.4 % (ref 0–0.5)
LYMPHOCYTES # BLD AUTO: 0.76 10*3/MM3 (ref 0.7–3.1)
LYMPHOCYTES NFR BLD AUTO: 28 % (ref 19.6–45.3)
MCH RBC QN AUTO: 33 PG (ref 26.6–33)
MCHC RBC AUTO-ENTMCNC: 32.1 G/DL (ref 31.5–35.7)
MCV RBC AUTO: 102.7 FL (ref 79–97)
MONOCYTES # BLD AUTO: 0.18 10*3/MM3 (ref 0.1–0.9)
MONOCYTES NFR BLD AUTO: 6.6 % (ref 5–12)
NEUTROPHILS NFR BLD AUTO: 1.55 10*3/MM3 (ref 1.7–7)
NEUTROPHILS NFR BLD AUTO: 57.2 % (ref 42.7–76)
NRBC BLD AUTO-RTO: 0 /100 WBC (ref 0–0.2)
PLATELET # BLD AUTO: 209 10*3/MM3 (ref 140–450)
PMV BLD AUTO: 9.1 FL (ref 6–12)
POTASSIUM SERPL-SCNC: 5.2 MMOL/L (ref 3.5–4.7)
PROT SERPL-MCNC: 7.8 G/DL (ref 6.3–8)
RBC # BLD AUTO: 2.97 10*6/MM3 (ref 3.77–5.28)
SODIUM SERPL-SCNC: 144 MMOL/L (ref 134–145)
WBC # BLD AUTO: 2.71 10*3/MM3 (ref 3.4–10.8)

## 2020-10-02 PROCEDURE — 25010000002 EPOETIN ALFA PER 1000 UNITS: Performed by: INTERNAL MEDICINE

## 2020-10-02 PROCEDURE — 80053 COMPREHEN METABOLIC PANEL: CPT

## 2020-10-02 PROCEDURE — 36415 COLL VENOUS BLD VENIPUNCTURE: CPT

## 2020-10-02 PROCEDURE — 99214 OFFICE O/P EST MOD 30 MIN: CPT | Performed by: NURSE PRACTITIONER

## 2020-10-02 PROCEDURE — 85025 COMPLETE CBC W/AUTO DIFF WBC: CPT

## 2020-10-02 PROCEDURE — 86300 IMMUNOASSAY TUMOR CA 15-3: CPT | Performed by: INTERNAL MEDICINE

## 2020-10-02 PROCEDURE — 96372 THER/PROPH/DIAG INJ SC/IM: CPT

## 2020-10-02 RX ADMIN — ERYTHROPOIETIN 20000 UNITS: 20000 INJECTION, SOLUTION INTRAVENOUS; SUBCUTANEOUS at 10:11

## 2020-10-05 ENCOUNTER — MEDICATION THERAPY MANAGEMENT (OUTPATIENT)
Dept: PHARMACY | Facility: HOSPITAL | Age: 60
End: 2020-10-05

## 2020-10-05 RX ORDER — FOLIC ACID 1 MG/1
TABLET ORAL
Qty: 90 TABLET | Refills: 0 | Status: SHIPPED | OUTPATIENT
Start: 2020-10-05 | End: 2021-01-05

## 2020-10-05 NOTE — PROGRESS NOTES
[facility] Lab Review- Ibrance + aromasin      10/2/2020   WBC 3.40 - 10.80 10*3/mm3 2.71 (A)   Neutrophils Absolute 1.70 - 7.00 10*3/mm3 1.55 (A)   Hemoglobin 12.0 - 15.9 g/dL 9.8 (A)   Hematocrit 34.0 - 46.6 % 30.5 (A)   Platelets 140 - 450 10*3/mm3 209   Creatinine 0.60 - 1.10 mg/dL 2.54 (A)   eGFR Non African Am >60 mL/min/1.73 19 (A)   BUN 6 - 20 mg/dL 35 (A)   Sodium 134 - 145 mmol/L 144   Potassium 3.5 - 4.7 mmol/L 5.2 (A)   Glucose 74 - 124 mg/dL 165 (A)   Calcium 8.5 - 10.2 mg/dL 9.3   Albumin 3.50 - 5.20 g/dL 4.10   Total Protein 6.3 - 8.0 g/dL 7.8   AST (SGOT) 0 - 32 U/L 12   ALT (SGPT) 0 - 33 U/L 8   Alkaline Phosphatase 38 - 116 U/L 64   Total Bilirubin 0.2 - 1.2 mg/dL 0.8   CA 15-3 <=25.0 U/mL 29.5 (A)     APRN dictation noted. Pharmacy will continue to follow.     Thanks,   Myah Argueta, PharmD

## 2020-10-06 RX ORDER — CARVEDILOL 3.12 MG/1
TABLET ORAL
Qty: 60 TABLET | Refills: 6 | Status: SHIPPED | OUTPATIENT
Start: 2020-10-06 | End: 2021-01-11 | Stop reason: SDUPTHER

## 2020-10-19 ENCOUNTER — HOSPITAL ENCOUNTER (OUTPATIENT)
Dept: PET IMAGING | Facility: HOSPITAL | Age: 60
Discharge: HOME | End: 2020-10-19
Admitting: INTERNAL MEDICINE

## 2020-10-19 DIAGNOSIS — D63.1 ANEMIA IN STAGE 3 CHRONIC KIDNEY DISEASE (HCC): ICD-10-CM

## 2020-10-19 DIAGNOSIS — C50.919 METASTATIC BREAST CANCER: ICD-10-CM

## 2020-10-19 DIAGNOSIS — N18.30 ANEMIA IN STAGE 3 CHRONIC KIDNEY DISEASE (HCC): ICD-10-CM

## 2020-10-19 PROCEDURE — 71250 CT THORAX DX C-: CPT

## 2020-10-19 PROCEDURE — 0 DIATRIZOATE MEGLUMINE & SODIUM PER 1 ML: Performed by: INTERNAL MEDICINE

## 2020-10-19 PROCEDURE — 74176 CT ABD & PELVIS W/O CONTRAST: CPT

## 2020-10-19 RX ADMIN — DIATRIZOATE MEGLUMINE AND DIATRIZOATE SODIUM 30 ML: 660; 100 LIQUID ORAL; RECTAL at 08:15

## 2020-10-21 NOTE — PROGRESS NOTES
Subjective .     REASONS FOR FOLLOWUP:    1. Metastatic lobular breast cancer (ER/MN positive, HER-2/tj negative):  · History of stage IIIC right breast cancer ([id number])  · Patient treated previously in the [facility]  · Diagnosis via excisional biopsy 8/23/2003 with lobular carcinoma, 4.5 cm, positive margins.  ER/MN positive, HER-2/tj negative.  · Staging evaluation in September 2003 with negative bone scan and negative CT scans.  Ejection fraction 65%.  · Received neoadjuvant chemotherapy (? GET regimen) which apparently included Taxotere and possibly epirubicin.  Received 6 cycles.  · 1/22/2004 bilateral mastectomy with right axillary dissection.  Per available records, 10-12 cm residual invasive lobular carcinoma in the breast with 14/16 lymph nodes positive with extranodal extension.  Immediate reconstruction.  · Received adjuvant chemotherapy with carboplatin and navelbine x4 cycles  · Initiated adjuvant tamoxifen 6/22/2004  · Adjuvant radiation therapy initiated but interrupted due to chest wall cellulitis requiring removal of implants in August 2004  · Implant reconstruction again attempted with MRSA infection, implant removed 12/30/2005 with no further attempts made and reconstruction.  · Completed 5 years tamoxifen in June 2009 and subsequently transitioned to Femara.  Received Femara until June 2014.  · Patient experienced postmenopausal vaginal bleeding in 2013, negative endometrial biopsy and gynecologic evaluation.  · Patient last seen in [facility] 6/18/2014  · CT chest performed to evaluate bicuspid aortic valve and dilated asending aorta 7/12/2019.  CT showed no change in aorta however showed new free intraperitoneal fluid in the upper abdomen with mesenteric edema, concerning for carcinomatosis.  · CT abdomen and pelvis (without IV contrast) on 7/16/2019 with mesenteric thickening, small volume of complex fluid in the mesentery which was suspicious and possibly representative of  carcinomatosis, small amount of perihepatic fluid, small bowel loops tethered to omentum without obstruction, omental thickening, shotty retroperitoneal and pelvic lymph nodes (non-pathologically enlarged).  · PET scan 7/26/2019 with hypermetabolic omental activity, hypermetabolic small retroperitoneal lymph nodes, hypermetabolic activity throughout uterus with increase in size.  · CT-guided omental biopsy 7/30/2019 with metastatic lobular carcinoma of breast primary, ER positive (greater than 95%), WA positive (90%), HER-2/tj negative (1+ IHC)  · Baseline CA 15-3 on 7/22/19 was 32.6  · Initiation of Aromasin 25 mg daily 8/12/2019.  Initiated Ibrance on 8/26/2019 at 100 mg 21/28 days on 8/26/2019.    · Response on CT scans 10/16/2019 following 2 cycles of Ibrance/Aromasin.  · Stable findings on subsequent CT chest abdomen pelvis 12/11/2019.  Further improvement in CA 15-3-23.9 on 12/18/2019.  Ibrance/Aromasin continued.    · Foundation medicine liquid biopsy 2/5/2020: MSI undetermined, mutations in BETH, NF1, CHEK2.  No evidence of PIK3CA mutation.  2. Anemia:  · Normocytic anemia, hemoglobin in 10-11 range  · Anemia evaluation 7/22/2019 with iron 30, ferritin 85.2, iron saturation 10%, TIBC 311, B12 370, erythropoietin level 20.4  · Anemia felt in large part to be related to CKD3 as well as to underlying malignancy  · Evidence of folate deficiency 8/12/2019 with level 3.84, initiated folic acid 1 mg daily  · If hemoglobin consistently below 10 consider use of Procrit  · Decline in hemoglobin into the 8 range, iron studies 7/20/2020 with iron 54, ferritin 66.7, iron saturation 16%, TIBC 328.  On 8/7/2020 proceeded with Injectafer 750 mg IV x1 dose.  Second dose not administered due to onset of fever, subsequent iron studies precluded further administration of IV iron.  · Initiated Procrit 20,000 units every 4 weeks on 9/4/2020.      HISTORY OF PRESENT ILLNESS:  The patient is a 60 y.o. year old female who is here  for follow-up with the above-mentioned history.    History of Present Illness   patient returns today in follow-up continuing on Aromasin 25 mg daily and Ibrance 100 mg daily for 21/28 days with CT scans and laboratory studies to review.  The patient is also continuing on every 4-week dosing of Procrit 20,000 units due to anemia secondary to CKD3.  The patient today notes that she is tolerating treatment extremely well.  She has minimal nausea requiring Zofran a few times per month.  She has some mild chronic dyspnea on exertion which is unchanged.  She does have some mild fatigue.  She has no new complaints today.    Past Medical History:   Diagnosis Date   • Anemia    • Anxiety and depression    • Bicuspid aortic valve    • Breast cancer (CMS/HCC)     RIGHT, HAD NEELA. MASTECTOMY, CHEMO AND RADIATION   • CKD (chronic kidney disease)    • Diabetes mellitus (CMS/HCC)    • Frequent UTI    • Heart murmur    • History of kidney stones    • History of MRSA infection     POST BREAST SURGERY-TREATED BHL   • History of sepsis     KIDNEY STONE   • Hyperlipidemia    • Hypertension    • Hyperthyroidism    • Hypothyroidism    • Lymphedema of leg    • Metastasis from breast cancer (CMS/HCC)     STOMACH-OMENTUM   • Neuromuscular disorder (CMS/HCC)    • Obesity    • ANDREW on CPAP    • Osteoarthrosis    • Radiculopathy    • Thickened endometrium    • Tremor      Past Surgical History:   Procedure Laterality Date   • BREAST RECONSTRUCTION      WITH IMPLANTS, AND REVISION, NOW REMOVED   • BREAST SURGERY     •  SECTION  1987   •  SECTION  1987   • D&C HYSTEROSCOPY N/A 2017    Procedure: DILATATION AND CURETTAGE, HYSTEROSCOPY ;  Surgeon: Cherie Oliveros MD;  Location: [facility] OR [facility] [place];  Service:    • D&C HYSTEROSCOPY N/A 2019    Procedure: DILATATION AND CURETTAGE HYSTEROSCOPY/POLYPECTOMY;  Surgeon: Cherie Oliveros MD;  Location: [facility] OR [facility] [place];  Service: Gynecology   • LYMPH  NODE BIOPSY     • MASTECTOMY Bilateral 01/2003   • MASTECTOMY Bilateral 2004         ONCOLOGIC HISTORY:  (History from previous dates can be found in the separate document.)    Current Outpatient Medications on File Prior to Visit   Medication Sig Dispense Refill   • amLODIPine (NORVASC) 10 MG tablet Take 10 mg by mouth Every Morning.     • amoxicillin (AMOXIL) 500 MG capsule TAKE 4 CAPSULES BY MOUTH 1 HOUR PRIOR TO DENTAL APPOINTMENT 4 capsule 0   • aspirin 81 MG EC tablet Take 81 mg by mouth Daily. HELD FOR SURGERY     • atorvastatin (LIPITOR) 40 MG tablet Take 1 tablet by mouth Daily. 90 tablet 1   • carvedilol (COREG) 3.125 MG tablet TAKE ONE TABLET BY MOUTH TWICE A DAY 60 tablet 6   • Dulaglutide 1.5 MG/0.5ML solution pen-injector Inject 1.5 mg under the skin into the appropriate area as directed 1 (One) Time Per Week. MONDAYS 12 pen 1   • DULoxetine (CYMBALTA) 60 MG capsule      • exemestane (AROMASIN) 25 MG chemo tablet TAKE ONE TABLET BY MOUTH DAILY 90 tablet 1   • folic acid (FOLVITE) 1 MG tablet TAKE ONE TABLET BY MOUTH DAILY 90 tablet 0   • Levemir FlexTouch 100 UNIT/ML injection Inject 50 Units under the skin into the appropriate area as directed 2 (Two) Times a Day. 3 pen 5   • levothyroxine (SYNTHROID, LEVOTHROID) 50 MCG tablet Take 1 tablet by mouth Daily. 90 tablet 1   • ondansetron (Zofran) 8 MG tablet Take 1 tablet by mouth Every 8 (Eight) Hours As Needed for Nausea or Vomiting. 30 tablet 2   • Palbociclib (Ibrance) 100 MG capsule capsule TAKE 1 CAPSULE BY MOUTH DAILY FOR 21 DAYS ON THEN 7 DAYS OFF 21 capsule 5   • repaglinide (PRANDIN) 2 MG tablet Take 1 tablet by mouth 3 (Three) Times a Day Before Meals. 270 tablet 1   • vitamin D (ERGOCALCIFEROL) 12304 units capsule capsule 50,000 Units Every 7 (Seven) Days.       No current facility-administered medications on file prior to visit.        ALLERGIES:   No Known Allergies    Social History     Socioeconomic History   • Marital status:       Spouse name: Not on file   • Number of children: Not on file   • Years of education: Not on file   • Highest education level: Not on file   Tobacco Use   • Smoking status: Never Smoker   • Smokeless tobacco: Never Used   Substance and Sexual Activity   • Alcohol use: No   • Drug use: No   • Sexual activity: Yes     Partners: Male     Birth control/protection: Post-menopausal     Family History   Problem Relation Age of Onset   • Coronary artery disease Mother         Mother  from MI at 72   • Diabetes Mother    • Breast cancer Mother    • Hyperlipidemia Mother    • Arthritis Mother    • Cancer Mother    • Aortic aneurysm Father         Father with ruptured thoracic aortic aneurysm   • Coronary artery disease Father         Father  from MI at 74   • Heart disease Father    • Hyperlipidemia Father    • Arthritis Father    • Coronary artery disease Maternal Grandmother         MGM deceeased from MI at age 76   • Malig Hyperthermia Neg Hx               Review of Systems   Constitutional: Positive for fatigue. Negative for activity change, appetite change, fever and unexpected weight change.   HENT: Negative for congestion, mouth sores, nosebleeds, sore throat and voice change.    Respiratory: Positive for shortness of breath. Negative for cough and wheezing.    Cardiovascular: Negative for chest pain, palpitations and leg swelling.   Gastrointestinal: Negative for abdominal distention, abdominal pain, blood in stool, constipation, diarrhea, nausea and vomiting.   Endocrine: Negative for cold intolerance and heat intolerance.   Genitourinary: Negative for difficulty urinating, dysuria, frequency and hematuria.   Musculoskeletal: Negative for arthralgias, back pain, joint swelling and myalgias.   Skin: Negative for rash.   Neurological: Positive for numbness. Negative for dizziness, syncope, weakness, light-headedness and headaches.   Hematological: Negative for adenopathy. Does not bruise/bleed easily.    Psychiatric/Behavioral: Negative for confusion and sleep disturbance. The patient is not nervous/anxious.          Objective      Vitals:    10/30/20 0847   BP: 161/78   Pulse: 72   Resp: 18   Temp: 97.1 °F (36.2 °C)   SpO2: 98%      Current Status 10/30/2020   ECOG score 2   Pain 0/10    Physical Exam   Constitutional: She is oriented to person, place, and time. She appears well-developed.   Patient does ambulate with the use of a rolling walker.   Eyes: Conjunctivae are normal.   Neck: No thyromegaly present.   Cardiovascular: Normal rate and regular rhythm. Exam reveals no gallop and no friction rub.   Murmur heard.  Pulmonary/Chest: Breath sounds normal. No respiratory distress.   Abdominal: Soft. Bowel sounds are normal. She exhibits no distension. There is no abdominal tenderness.   Musculoskeletal:      Comments: Trace to 1+ chronic bilateral lower extremity edema with venous stasis changes   Lymphadenopathy:        Head (right side): No submandibular adenopathy present.     She has no cervical adenopathy. No inguinal adenopathy noted on the right or left side.        Right: No supraclavicular adenopathy present.        Left: No supraclavicular adenopathy present.   Neurological: She is alert and oriented to person, place, and time. She displays normal reflexes. No cranial nerve deficit. She exhibits normal muscle tone.   Skin: Skin is warm and dry. No rash noted.   Psychiatric: Her behavior is normal.   The patient was examined today, unchanged from above    RECENT LABS:  Hematology WBC   Date Value Ref Range Status   10/30/2020 3.14 (L) 3.40 - 10.80 10*3/mm3 Final   03/16/2019 7.6 3.4 - 10.8 x10E3/uL Final     RBC   Date Value Ref Range Status   10/30/2020 2.80 (L) 3.77 - 5.28 10*6/mm3 Final   03/16/2019 3.80 3.77 - 5.28 x10E6/uL Final     Hemoglobin   Date Value Ref Range Status   10/30/2020 9.4 (L) 12.0 - 15.9 g/dL Final     Hematocrit   Date Value Ref Range Status   10/30/2020 29.7 (L) 34.0 - 46.6 %  Final     Platelets   Date Value Ref Range Status   10/30/2020 224 140 - 450 10*3/mm3 Final        Lab Results   Component Value Date    GLUCOSE 187 (H) 10/30/2020    BUN 31 (H) 10/30/2020    CREATININE 2.75 (C) 10/30/2020    EGFRIFNONA 17 (L) 10/30/2020    EGFRIFAFRI 35 (L) 03/16/2019    BCR 11.3 10/30/2020    K 5.4 (H) 10/30/2020    CO2 27.4 10/30/2020    CALCIUM 9.0 10/30/2020    PROTENTOTREF 7.0 03/16/2019    ALBUMIN 3.70 10/30/2020    LABIL2 1.2 03/16/2019    AST 10 10/30/2020    ALT 8 10/30/2020     Additional labs reviewed as outlined below.  I did review patient's CT scan from 10/19/2020 as outlined below.  I did personally review CT images.    Assessment/Plan    1. Metastatic lobular breast cancer (ER/ME positive, HER-2/tj negative):  · History of stage IIIC right breast cancer ([id number])  · Patient treated previously in the [facility] system  · Diagnosis via excisional biopsy 8/23/2003 with lobular carcinoma, 4.5 cm, positive margins.  ER/ME positive, HER-2/tj negative.  · Staging evaluation in September 2003 with negative bone scan and negative CT scans.  Ejection fraction 65%.  · Received neoadjuvant chemotherapy (? GET regimen) which apparently included Taxotere and possibly epirubicin.  Received 6 cycles.  · 1/22/2004 bilateral mastectomy with right axillary dissection.  Per available records, 10-12 cm residual invasive lobular carcinoma in the breast with 14/16 lymph nodes positive with extranodal extension.  Immediate reconstruction.  · Received adjuvant chemotherapy with carboplatin and navelbine x4 cycles  · Initiated adjuvant tamoxifen 6/22/2004  · Adjuvant radiation therapy initiated but interrupted due to chest wall cellulitis requiring removal of implants in August 2004  · Implant reconstruction again attempted with MRSA infection, implant removed 12/30/2005 with no further attempts made and reconstruction.  · Completed 5 years tamoxifen in June 2009 and subsequently transitioned to Femara.   Received Femara until June 2014.  · Patient experienced postmenopausal vaginal bleeding in 2013, negative endometrial biopsy and gynecologic evaluation.  · Patient last seen in [facility] 6/18/2014  · CT chest performed to evaluate bicuspid aortic valve and dilated asending aorta 7/12/2019.  CT showed no change in aorta however showed new free intraperitoneal fluid in the upper abdomen with mesenteric edema, concerning for carcinomatosis.  · CT abdomen and pelvis (without IV contrast) on 7/16/2019 with mesenteric thickening, small volume of complex fluid in the mesentery which was suspicious and possibly representative of carcinomatosis, small amount of perihepatic fluid, small bowel loops tethered to omentum without obstruction, omental thickening, shotty retroperitoneal and pelvic lymph nodes (non-pathologically enlarged).  · PET scan 7/26/2019 with hypermetabolic omental activity, hypermetabolic small retroperitoneal lymph nodes, hypermetabolic activity throughout uterus with increase in size.  · CT-guided omental biopsy 7/30/2019 with metastatic lobular carcinoma of breast primary, ER positive (greater than 95%), MI positive (90%), HER-2/tj negative (1+ IHC)  · Baseline CA 15-3 on 7/22/19 was 32.6  · Initiation of Aromasin 25 mg daily 8/12/2019.  Initiated Ibrance at 100 mg 21/28 days on 8/26/2019.  · Improvement in CA 15-3 on 9/17/19-26.3  · Following 2 cycles of Aromasin and Ibrance, CA 15-3 declined to 25.9 on 10/15/2019.  CT scan chest abdomen pelvis 10/16/2019 showed improvement in the mesenteric and omental thickening and near resolution of complex ascites.  Treatment continued.  · Stable findings on subsequent CT chest abdomen pelvis 12/11/2019.  Further improvement in CA 15-3-23.9 on 12/18/2019.  Ibrance/Aromasin continued.   · Foundation medicine liquid biopsy 2/5/2020: MSI undetermined, mutations in BETH, NF1, CHEK2.  No evidence of PIK3CA mutation.  · Slight increase in CA 15-3 to 27.1 on 2/19/2020  and 29.1 on 3/18/2020.  CT however on 3/13/2020 with no new findings.  · Subsequent improvement in CA 15-3-26.5 on 5/15/2020  · CT 7/31/2020 showed evidence of stable disease.  CA 15-3 declined further to 23.1 on 7/24/2020.  · CT 10/19/2020 with stable disease.  Fluctuations in CA 15-3 of unclear significance.  · Patient returns today in follow-up continuing on Aromasin 25 mg daily, Ibrance 100 mg daily for 21/28 days (currently due to complete current cycle of Ibrance on 11/3/2020).  The patient is tolerating treatment reasonably well with only some mild nausea and mild fatigue.  We reviewed her CT scans from 10/19/2020 showing stable findings.  We did discuss her recent fluctuations in CA 15-3, increased to 29.5 on 10/2/2019.  Given the minimal overall elevation in her level, these mild fluctuations are of unclear significance.  We will proceed on with treatment as planned.  Patient will return for nurse practitioner visit and 1 month.  In 2 months she will have MD visit and we will recheck CA 15-3.  She will have MD visit again in 3 months and just prior to this we will perform CT scans.  2. Normocytic anemia in the setting of CKD3:  · The patient appears to have a chronic anemia with hemoglobin in the 10-11 range with normal MCV.  · Patient has underlying CKD3 with baseline creatinine recently in the 1.5-1.8 range.  · Anemia evaluation 7/22/2019 with iron 30, ferritin 85.2, iron saturation 10%, TIBC 311, B12 370, erythropoietin level 20.4  · Anemia felt in large part to be related to CKD3 as well as to underlying malignancy  · Evidence of folate deficiency 8/12/2019 with level 3.84, initiated folic acid 1 mg daily  · Additional labs on 12/18/2019 with iron 73, ferritin 82.2, iron saturation 25%, TIBC 290.  We did discuss the potential use of Procrit if her hemoglobin declines into the low 9/upper 8 range.  · Decline in hemoglobin into the 8 range, iron studies 7/20/2020 with iron 54, ferritin 66.7, iron  saturation 16%, TIBC 328.  On 8/7/2020 proceeded with Injectafer 750 mg IV x1 dose.  Second dose not administered due to onset of fever, subsequent iron studies precluded further administration of IV iron.  · Initiated Procrit 20,000 units every 4 weeks on 9/4/2020.  · Patient returns today continuing on Procrit every 4 weeks 20,000 units for hemoglobin less than 10.  Current hemoglobin is 9.4 and we will administer Procrit today.  We did recheck iron studies today with iron 69, ferritin 201.6, iron saturation 22%, TIBC 318.  Recheck iron studies in 3 months.  3. CKD3:  · Baseline creatinine recently in 1.6-2.0 range  · On 9/10/2019, RYAN/CKD3 with creatinine up to 2.44, BUN of 40.  Received 1 L normal saline.  Patient with increase in oral fluid intake.  · Renal ultrasound 9/20/2019 with no evidence of obstructive uropathy.  · Diminished oral intake due to nausea from Ibrance, creatinine 2.79 with BUN 43 on 10/15/2019.  Received 1 L normal saline.  Repeat labs on 10/18/2019 with BUN 31, creatinine 2.0.  · During cycle 3 Ibrance, patient developed increase in creatinine on 11/6/2019 to 2.35 and received 1 L normal saline.  · Patient is trying to maintain adequate oral hydration.    · Gradual escalation of creatinine into the 2-2 0.4 range and subsequently into the 2.5-2.8 range  · Creatinine today 2.75.  Asked patient to maintain adequate oral fluid intake routinely.  4. CHF with mild to moderate aortic stenosis:  · Recent cardiology evaluation with echocardiogram 7/12/2019 showing ejection fraction 48% with moderate dilation of the LV cavity, global hypokinesis, grade 2 diastolic dysfunction, mild to moderate aortic stenosis, trivial pericardial effusion.  · CT chest 7/12/2019 with no change in the aorta compared to prior study 12/13/2017.  · No comment on current CT chest 10/19/2020 regarding aorta.  5. Left upper and bilateral lower extremity peripheral neuropathy:  · Patient reports that left upper extremity  neuropathy was not present from previous chemotherapy but occurred after hospitalization that required intubation and critical care stay.  Apparently felt to represent critical care neuropathy.  Improved over time.  · Bilateral lower extremity neuropathy felt to be related to diabetes  · May have future implications regarding treatment for breast cancer.  6. Uterine/endometrial activity on PET scan:  · Patient experienced postmenopausal vaginal bleeding in 2013, negative endometrial biopsy and gynecologic evaluation.  · With findings on PET scan with enlarging uterus and some hypermetabolic activity, referred back to Dr. Oliveros and gynecology.    · 9/19/2019 D&C with hysteroscopy by Dr. Oliveros, no significant intraoperative findings, pathologic results with benign findings, no evidence of malignancy.  7. Nausea:  · Mild, relieved with Zofran.    8. Myelosuppression secondary to Ibrance:  · With cycle 1, jessica WBC 2.49 with ANC 1.25 and platelet count 140,000.  · With cycle 2, jessica WBC 2.33 with ANC 1.06, maintained normal platelet count  · Today, WBC 3.14 with ANC 2.02.  9. Hyperkalemia  · Patient with borderline elevated potassium in the past  · Today, potassium 5.4.  Patient denies any potassium supplementation.  She has been eating potassium containing foods more frequently such as potatoes and I have asked her to alter her diet.     Plan:  1. Continue Aromasin 25 mg daily.    2. Continue Ibrance 100 mg daily for 21/28 days (patient will complete current dosing of Ibrance on 11/3/2020)  3. Continue oral folic acid 1 mg daily  4. Procrit 20,000 units today  5. Nurse practitioner visit in 4 weeks with CBC, CMP and Procrit.  6. In 8 weeks MD visit with CBC, CMP, CA 15-3 and Procrit  7. In 11 weeks CT chest abdomen pelvis  8. MD visit in 12 weeks with CBC, CMP, iron panel, ferritin and will be due for Procrit.    Patient continues on high risk medication requiring intensive monitoring.

## 2020-10-30 ENCOUNTER — TELEPHONE (OUTPATIENT)
Dept: ONCOLOGY | Facility: CLINIC | Age: 60
End: 2020-10-30

## 2020-10-30 ENCOUNTER — LAB (OUTPATIENT)
Dept: LAB | Facility: HOSPITAL | Age: 60
End: 2020-10-30

## 2020-10-30 ENCOUNTER — INFUSION (OUTPATIENT)
Dept: ONCOLOGY | Facility: HOSPITAL | Age: 60
End: 2020-10-30

## 2020-10-30 ENCOUNTER — TELEPHONE (OUTPATIENT)
Dept: ONCOLOGY | Facility: HOSPITAL | Age: 60
End: 2020-10-30

## 2020-10-30 ENCOUNTER — OFFICE VISIT (OUTPATIENT)
Dept: ONCOLOGY | Facility: CLINIC | Age: 60
End: 2020-10-30

## 2020-10-30 VITALS
BODY MASS INDEX: 61.49 KG/M2 | TEMPERATURE: 97.1 F | HEART RATE: 72 BPM | OXYGEN SATURATION: 98 % | WEIGHT: 293 LBS | DIASTOLIC BLOOD PRESSURE: 78 MMHG | RESPIRATION RATE: 18 BRPM | SYSTOLIC BLOOD PRESSURE: 161 MMHG

## 2020-10-30 DIAGNOSIS — N18.30 ANEMIA IN STAGE 3 CHRONIC KIDNEY DISEASE, UNSPECIFIED WHETHER STAGE 3A OR 3B CKD (HCC): ICD-10-CM

## 2020-10-30 DIAGNOSIS — C50.919 METASTATIC BREAST CANCER: ICD-10-CM

## 2020-10-30 DIAGNOSIS — D63.1 ANEMIA IN STAGE 3 CHRONIC KIDNEY DISEASE (HCC): ICD-10-CM

## 2020-10-30 DIAGNOSIS — F32.A DEPRESSION, UNSPECIFIED DEPRESSION TYPE: ICD-10-CM

## 2020-10-30 DIAGNOSIS — C50.919 METASTATIC BREAST CANCER: Primary | ICD-10-CM

## 2020-10-30 DIAGNOSIS — D50.8 OTHER IRON DEFICIENCY ANEMIA: Primary | ICD-10-CM

## 2020-10-30 DIAGNOSIS — N18.30 ANEMIA IN STAGE 3 CHRONIC KIDNEY DISEASE (HCC): ICD-10-CM

## 2020-10-30 DIAGNOSIS — D64.9 NORMOCYTIC ANEMIA: ICD-10-CM

## 2020-10-30 DIAGNOSIS — D63.1 ANEMIA IN STAGE 3 CHRONIC KIDNEY DISEASE, UNSPECIFIED WHETHER STAGE 3A OR 3B CKD (HCC): ICD-10-CM

## 2020-10-30 LAB
ALBUMIN SERPL-MCNC: 3.7 G/DL (ref 3.5–5.2)
ALBUMIN/GLOB SERPL: 1 G/DL (ref 1.1–2.4)
ALP SERPL-CCNC: 72 U/L (ref 38–116)
ALT SERPL W P-5'-P-CCNC: 8 U/L (ref 0–33)
ANION GAP SERPL CALCULATED.3IONS-SCNC: 9.6 MMOL/L (ref 5–15)
AST SERPL-CCNC: 10 U/L (ref 0–32)
BASOPHILS # BLD AUTO: 0.07 10*3/MM3 (ref 0–0.2)
BASOPHILS NFR BLD AUTO: 2.2 % (ref 0–1.5)
BILIRUB SERPL-MCNC: 0.5 MG/DL (ref 0.2–1.2)
BUN SERPL-MCNC: 31 MG/DL (ref 6–20)
BUN/CREAT SERPL: 11.3 (ref 7.3–30)
CALCIUM SPEC-SCNC: 9 MG/DL (ref 8.5–10.2)
CHLORIDE SERPL-SCNC: 103 MMOL/L (ref 98–107)
CO2 SERPL-SCNC: 27.4 MMOL/L (ref 22–29)
CREAT SERPL-MCNC: 2.75 MG/DL (ref 0.6–1.1)
DEPRECATED RDW RBC AUTO: 58.4 FL (ref 37–54)
EOSINOPHIL # BLD AUTO: 0.1 10*3/MM3 (ref 0–0.4)
EOSINOPHIL NFR BLD AUTO: 3.2 % (ref 0.3–6.2)
ERYTHROCYTE [DISTWIDTH] IN BLOOD BY AUTOMATED COUNT: 15.3 % (ref 12.3–15.4)
FERRITIN SERPL-MCNC: 201.6 NG/ML (ref 13–150)
GFR SERPL CREATININE-BSD FRML MDRD: 17 ML/MIN/1.73
GLOBULIN UR ELPH-MCNC: 3.8 GM/DL (ref 1.8–3.5)
GLUCOSE SERPL-MCNC: 187 MG/DL (ref 74–124)
HCT VFR BLD AUTO: 29.7 % (ref 34–46.6)
HGB BLD-MCNC: 9.4 G/DL (ref 12–15.9)
IMM GRANULOCYTES # BLD AUTO: 0.01 10*3/MM3 (ref 0–0.05)
IMM GRANULOCYTES NFR BLD AUTO: 0.3 % (ref 0–0.5)
IRON 24H UR-MRATE: 69 MCG/DL (ref 37–145)
IRON SATN MFR SERPL: 22 % (ref 14–48)
LYMPHOCYTES # BLD AUTO: 0.73 10*3/MM3 (ref 0.7–3.1)
LYMPHOCYTES NFR BLD AUTO: 23.2 % (ref 19.6–45.3)
MCH RBC QN AUTO: 33.6 PG (ref 26.6–33)
MCHC RBC AUTO-ENTMCNC: 31.6 G/DL (ref 31.5–35.7)
MCV RBC AUTO: 106.1 FL (ref 79–97)
MONOCYTES # BLD AUTO: 0.21 10*3/MM3 (ref 0.1–0.9)
MONOCYTES NFR BLD AUTO: 6.7 % (ref 5–12)
NEUTROPHILS NFR BLD AUTO: 2.02 10*3/MM3 (ref 1.7–7)
NEUTROPHILS NFR BLD AUTO: 64.4 % (ref 42.7–76)
NRBC BLD AUTO-RTO: 0 /100 WBC (ref 0–0.2)
PLATELET # BLD AUTO: 224 10*3/MM3 (ref 140–450)
PMV BLD AUTO: 8.9 FL (ref 6–12)
POTASSIUM SERPL-SCNC: 5.4 MMOL/L (ref 3.5–4.7)
PROT SERPL-MCNC: 7.5 G/DL (ref 6.3–8)
RBC # BLD AUTO: 2.8 10*6/MM3 (ref 3.77–5.28)
SODIUM SERPL-SCNC: 140 MMOL/L (ref 134–145)
TIBC SERPL-MCNC: 318 MCG/DL (ref 249–505)
TRANSFERRIN SERPL-MCNC: 227 MG/DL (ref 200–360)
WBC # BLD AUTO: 3.14 10*3/MM3 (ref 3.4–10.8)

## 2020-10-30 PROCEDURE — 85025 COMPLETE CBC W/AUTO DIFF WBC: CPT

## 2020-10-30 PROCEDURE — 84466 ASSAY OF TRANSFERRIN: CPT

## 2020-10-30 PROCEDURE — 99215 OFFICE O/P EST HI 40 MIN: CPT | Performed by: INTERNAL MEDICINE

## 2020-10-30 PROCEDURE — 83540 ASSAY OF IRON: CPT

## 2020-10-30 PROCEDURE — 25010000002 EPOETIN ALFA PER 1000 UNITS: Performed by: INTERNAL MEDICINE

## 2020-10-30 PROCEDURE — 82728 ASSAY OF FERRITIN: CPT

## 2020-10-30 PROCEDURE — 36415 COLL VENOUS BLD VENIPUNCTURE: CPT

## 2020-10-30 PROCEDURE — 80053 COMPREHEN METABOLIC PANEL: CPT

## 2020-10-30 PROCEDURE — 96372 THER/PROPH/DIAG INJ SC/IM: CPT

## 2020-10-30 RX ADMIN — ERYTHROPOIETIN 20000 UNITS: 20000 INJECTION, SOLUTION INTRAVENOUS; SUBCUTANEOUS at 09:45

## 2020-10-30 NOTE — TELEPHONE ENCOUNTER
----- Message from Leodan Irvin Jr., MD sent at 10/30/2020  9:46 AM EDT -----  Please make sure labs are forwarded to patient's nephrologist Dr Montes  ----- Message -----  From: Lab, Background User  Sent: 10/30/2020   8:46 AM EDT  To: Leodan Irvin Jr., MD

## 2020-10-30 NOTE — TELEPHONE ENCOUNTER
Juana's asking to either reschedule her lab and follow up 02/03 to after the CT 02/05 so that Dr. Irvin has CT results for that appt, or reschedule the CT to before the lab and follow up appts.     Phone# [phone]

## 2020-11-02 ENCOUNTER — TELEPHONE (OUTPATIENT)
Dept: ONCOLOGY | Facility: CLINIC | Age: 60
End: 2020-11-02

## 2020-11-02 ENCOUNTER — MEDICATION THERAPY MANAGEMENT (OUTPATIENT)
Dept: PHARMACY | Facility: HOSPITAL | Age: 60
End: 2020-11-02

## 2020-11-02 RX ORDER — DULOXETIN HYDROCHLORIDE 30 MG/1
CAPSULE, DELAYED RELEASE ORAL
Qty: 270 CAPSULE | Refills: 0 | OUTPATIENT
Start: 2020-11-02

## 2020-11-02 NOTE — TELEPHONE ENCOUNTER
Pt needs to reschedule her 11/6 lab and RN review appts.    She is available any day or time next week or the following week.    You do not need to give her a call. She will go to [facility] to verify appt.    Best call back # [phone]

## 2020-11-02 NOTE — PROGRESS NOTES
[facility] Lab Review- Ibrance + Aromasin      10/30/2020   WBC 3.40 - 10.80 10*3/mm3 3.14 (A)   Neutrophils Absolute 1.70 - 7.00 10*3/mm3 2.02   Hemoglobin 12.0 - 15.9 g/dL 9.4 (A)   Hematocrit 34.0 - 46.6 % 29.7 (A)   Platelets 140 - 450 10*3/mm3 224   Creatinine 0.60 - 1.10 mg/dL 2.75 (A)   eGFR Non African Am >60 mL/min/1.73 17 (A)   BUN 6 - 20 mg/dL 31 (A)   Sodium 134 - 145 mmol/L 140   Potassium 3.5 - 4.7 mmol/L 5.4 (A)   Glucose 74 - 124 mg/dL 187 (A)   Calcium 8.5 - 10.2 mg/dL 9.0   Albumin 3.50 - 5.20 g/dL 3.70   Total Protein 6.3 - 8.0 g/dL 7.5   AST (SGOT) 0 - 32 U/L 10   ALT (SGPT) 0 - 33 U/L 8   Alkaline Phosphatase 38 - 116 U/L 72   Total Bilirubin 0.2 - 1.2 mg/dL 0.5   Iron 37 - 145 mcg/dL 69   TIBC 249 - 505 mcg/dL 318   Ferritin 13.00 - 150.00 ng/mL 201.60 (A)   Iron Saturation 14 - 48 % 22     MD dictation noted. Pharmacy will continue to follow.     Thanks,   Myah Argueta, PharmD

## 2020-11-06 ENCOUNTER — APPOINTMENT (OUTPATIENT)
Dept: ONCOLOGY | Facility: HOSPITAL | Age: 60
End: 2020-11-06

## 2020-11-06 ENCOUNTER — APPOINTMENT (OUTPATIENT)
Dept: LAB | Facility: HOSPITAL | Age: 60
End: 2020-11-06

## 2020-11-13 ENCOUNTER — LAB (OUTPATIENT)
Dept: LAB | Facility: HOSPITAL | Age: 60
End: 2020-11-13

## 2020-11-13 ENCOUNTER — CLINICAL SUPPORT (OUTPATIENT)
Dept: ONCOLOGY | Facility: HOSPITAL | Age: 60
End: 2020-11-13

## 2020-11-13 DIAGNOSIS — F32.A DEPRESSION, UNSPECIFIED DEPRESSION TYPE: ICD-10-CM

## 2020-11-13 DIAGNOSIS — N18.30 ANEMIA IN STAGE 3 CHRONIC KIDNEY DISEASE, UNSPECIFIED WHETHER STAGE 3A OR 3B CKD (HCC): ICD-10-CM

## 2020-11-13 DIAGNOSIS — D63.1 ANEMIA IN STAGE 3 CHRONIC KIDNEY DISEASE, UNSPECIFIED WHETHER STAGE 3A OR 3B CKD (HCC): ICD-10-CM

## 2020-11-13 LAB
ANION GAP SERPL CALCULATED.3IONS-SCNC: 12.3 MMOL/L (ref 5–15)
BASOPHILS # BLD AUTO: 0.07 10*3/MM3 (ref 0–0.2)
BASOPHILS NFR BLD AUTO: 1.6 % (ref 0–1.5)
BUN SERPL-MCNC: 32 MG/DL (ref 6–20)
BUN/CREAT SERPL: 12 (ref 7.3–30)
CALCIUM SPEC-SCNC: 9 MG/DL (ref 8.5–10.2)
CHLORIDE SERPL-SCNC: 103 MMOL/L (ref 98–107)
CO2 SERPL-SCNC: 24.7 MMOL/L (ref 22–29)
CREAT SERPL-MCNC: 2.66 MG/DL (ref 0.6–1.1)
DEPRECATED RDW RBC AUTO: 61.8 FL (ref 37–54)
EOSINOPHIL # BLD AUTO: 0.08 10*3/MM3 (ref 0–0.4)
EOSINOPHIL NFR BLD AUTO: 1.8 % (ref 0.3–6.2)
ERYTHROCYTE [DISTWIDTH] IN BLOOD BY AUTOMATED COUNT: 15.9 % (ref 12.3–15.4)
GFR SERPL CREATININE-BSD FRML MDRD: 18 ML/MIN/1.73
GLUCOSE SERPL-MCNC: 238 MG/DL (ref 74–124)
HCT VFR BLD AUTO: 30.3 % (ref 34–46.6)
HGB BLD-MCNC: 9.7 G/DL (ref 12–15.9)
IMM GRANULOCYTES # BLD AUTO: 0.03 10*3/MM3 (ref 0–0.05)
IMM GRANULOCYTES NFR BLD AUTO: 0.7 % (ref 0–0.5)
LYMPHOCYTES # BLD AUTO: 0.7 10*3/MM3 (ref 0.7–3.1)
LYMPHOCYTES NFR BLD AUTO: 15.6 % (ref 19.6–45.3)
MCH RBC QN AUTO: 33.8 PG (ref 26.6–33)
MCHC RBC AUTO-ENTMCNC: 32 G/DL (ref 31.5–35.7)
MCV RBC AUTO: 105.6 FL (ref 79–97)
MONOCYTES # BLD AUTO: 0.51 10*3/MM3 (ref 0.1–0.9)
MONOCYTES NFR BLD AUTO: 11.4 % (ref 5–12)
NEUTROPHILS NFR BLD AUTO: 3.1 10*3/MM3 (ref 1.7–7)
NEUTROPHILS NFR BLD AUTO: 68.9 % (ref 42.7–76)
NRBC BLD AUTO-RTO: 0 /100 WBC (ref 0–0.2)
PLATELET # BLD AUTO: 200 10*3/MM3 (ref 140–450)
PMV BLD AUTO: 9.2 FL (ref 6–12)
POTASSIUM SERPL-SCNC: 4.9 MMOL/L (ref 3.5–4.7)
RBC # BLD AUTO: 2.87 10*6/MM3 (ref 3.77–5.28)
SODIUM SERPL-SCNC: 140 MMOL/L (ref 134–145)
WBC # BLD AUTO: 4.49 10*3/MM3 (ref 3.4–10.8)

## 2020-11-13 PROCEDURE — 85025 COMPLETE CBC W/AUTO DIFF WBC: CPT

## 2020-11-13 PROCEDURE — G0463 HOSPITAL OUTPT CLINIC VISIT: HCPCS

## 2020-11-13 PROCEDURE — 36415 COLL VENOUS BLD VENIPUNCTURE: CPT

## 2020-11-13 PROCEDURE — 80048 BASIC METABOLIC PNL TOTAL CA: CPT

## 2020-11-13 RX ORDER — DULOXETIN HYDROCHLORIDE 30 MG/1
CAPSULE, DELAYED RELEASE ORAL
Qty: 270 CAPSULE | Refills: 0 | OUTPATIENT
Start: 2020-11-13

## 2020-11-13 NOTE — PROGRESS NOTES
Pt here for RN view. Counts stable. Pt reports taking Aromasin 25 mg daily and Ibrance 100 mg daily for 21/28 days with no issues. Pt started Ibrance cycle on 11/10 and has no complaints. Pt denied copy of labs. Will call pt once BMP results. Pt v/u.    Lab Results   Component Value Date    WBC 4.49 11/13/2020    HGB 9.7 (L) 11/13/2020    HCT 30.3 (L) 11/13/2020    .6 (H) 11/13/2020     11/13/2020     Called pt with CMP results. Kidney function test are still elevated. Potassium has decreased since last visit. Reviewed with pt to continue decreasing intake of high potassium foods. Pt v/u.    Lab Results   Component Value Date    GLUCOSE 238 (H) 11/13/2020    CALCIUM 9.0 11/13/2020     11/13/2020    K 4.9 (H) 11/13/2020    CO2 24.7 11/13/2020     11/13/2020    BUN 32 (H) 11/13/2020    CREATININE 2.66 (C) 11/13/2020    EGFRIFAFRI 35 (L) 03/16/2019    EGFRIFNONA 18 (L) 11/13/2020    BCR 12.0 11/13/2020    ANIONGAP 12.3 11/13/2020

## 2020-11-18 ENCOUNTER — TELEPHONE (OUTPATIENT)
Dept: FAMILY MEDICINE CLINIC | Facility: CLINIC | Age: 60
End: 2020-11-18

## 2020-11-18 RX ORDER — DULOXETIN HYDROCHLORIDE 60 MG/1
60 CAPSULE, DELAYED RELEASE ORAL DAILY
Qty: 90 CAPSULE | Refills: 1 | Status: SHIPPED | OUTPATIENT
Start: 2020-11-18 | End: 2021-04-15 | Stop reason: SDUPTHER

## 2020-11-18 NOTE — TELEPHONE ENCOUNTER
----- Message from Marleny Soliz sent at 11/18/2020  8:33 AM EST -----  Regarding: FW: Prescription Question  Contact: [phone]    ----- Message -----  From: Juana Hall  Sent: 11/17/2020   5:00 PM EST  To: [facility] 2 Clinical Pool  Subject: RE: Prescription Question                        No, I have enough for 6 more days, I take 1 60 mg a day, we increased that last spring and I was taking 90 mg per day. But then we backed off to 60.   I did not have this prescription filled in September, I honestly don’t know what to do.  Please advise

## 2020-12-02 ENCOUNTER — OFFICE VISIT (OUTPATIENT)
Dept: ONCOLOGY | Facility: CLINIC | Age: 60
End: 2020-12-02

## 2020-12-02 ENCOUNTER — INFUSION (OUTPATIENT)
Dept: ONCOLOGY | Facility: HOSPITAL | Age: 60
End: 2020-12-02

## 2020-12-02 ENCOUNTER — LAB (OUTPATIENT)
Dept: LAB | Facility: HOSPITAL | Age: 60
End: 2020-12-02

## 2020-12-02 VITALS
HEART RATE: 93 BPM | TEMPERATURE: 98.7 F | RESPIRATION RATE: 18 BRPM | BODY MASS INDEX: 44.41 KG/M2 | WEIGHT: 293 LBS | OXYGEN SATURATION: 93 % | HEIGHT: 68 IN | SYSTOLIC BLOOD PRESSURE: 164 MMHG | DIASTOLIC BLOOD PRESSURE: 74 MMHG

## 2020-12-02 DIAGNOSIS — N18.30 ANEMIA IN STAGE 3 CHRONIC KIDNEY DISEASE, UNSPECIFIED WHETHER STAGE 3A OR 3B CKD (HCC): Primary | ICD-10-CM

## 2020-12-02 DIAGNOSIS — D50.8 OTHER IRON DEFICIENCY ANEMIA: ICD-10-CM

## 2020-12-02 DIAGNOSIS — N18.30 ANEMIA IN STAGE 3 CHRONIC KIDNEY DISEASE, UNSPECIFIED WHETHER STAGE 3A OR 3B CKD (HCC): ICD-10-CM

## 2020-12-02 DIAGNOSIS — D64.9 NORMOCYTIC ANEMIA: ICD-10-CM

## 2020-12-02 DIAGNOSIS — D63.1 ANEMIA IN STAGE 3 CHRONIC KIDNEY DISEASE, UNSPECIFIED WHETHER STAGE 3A OR 3B CKD (HCC): ICD-10-CM

## 2020-12-02 DIAGNOSIS — C50.919 METASTATIC BREAST CANCER: Primary | ICD-10-CM

## 2020-12-02 DIAGNOSIS — D63.1 ANEMIA IN STAGE 3 CHRONIC KIDNEY DISEASE, UNSPECIFIED WHETHER STAGE 3A OR 3B CKD (HCC): Primary | ICD-10-CM

## 2020-12-02 DIAGNOSIS — D50.8 OTHER IRON DEFICIENCY ANEMIA: Primary | ICD-10-CM

## 2020-12-02 LAB
ALBUMIN SERPL-MCNC: 3.8 G/DL (ref 3.5–5.2)
ALBUMIN/GLOB SERPL: 1.1 G/DL (ref 1.1–2.4)
ALP SERPL-CCNC: 73 U/L (ref 38–116)
ALT SERPL W P-5'-P-CCNC: <5 U/L (ref 0–33)
ANION GAP SERPL CALCULATED.3IONS-SCNC: 13.3 MMOL/L (ref 5–15)
AST SERPL-CCNC: 8 U/L (ref 0–32)
BASOPHILS # BLD AUTO: 0.07 10*3/MM3 (ref 0–0.2)
BASOPHILS NFR BLD AUTO: 2.4 % (ref 0–1.5)
BILIRUB SERPL-MCNC: 0.6 MG/DL (ref 0.2–1.2)
BUN SERPL-MCNC: 30 MG/DL (ref 6–20)
BUN/CREAT SERPL: 12.8 (ref 7.3–30)
CALCIUM SPEC-SCNC: 9.1 MG/DL (ref 8.5–10.2)
CHLORIDE SERPL-SCNC: 104 MMOL/L (ref 98–107)
CO2 SERPL-SCNC: 23.7 MMOL/L (ref 22–29)
CREAT SERPL-MCNC: 2.35 MG/DL (ref 0.6–1.1)
DEPRECATED RDW RBC AUTO: 55.9 FL (ref 37–54)
EOSINOPHIL # BLD AUTO: 0.11 10*3/MM3 (ref 0–0.4)
EOSINOPHIL NFR BLD AUTO: 3.8 % (ref 0.3–6.2)
ERYTHROCYTE [DISTWIDTH] IN BLOOD BY AUTOMATED COUNT: 15.4 % (ref 12.3–15.4)
FERRITIN SERPL-MCNC: 213.8 NG/ML (ref 13–150)
GFR SERPL CREATININE-BSD FRML MDRD: 21 ML/MIN/1.73
GLOBULIN UR ELPH-MCNC: 3.5 GM/DL (ref 1.8–3.5)
GLUCOSE SERPL-MCNC: 153 MG/DL (ref 74–124)
HCT VFR BLD AUTO: 27.5 % (ref 34–46.6)
HGB BLD-MCNC: 9 G/DL (ref 12–15.9)
IMM GRANULOCYTES # BLD AUTO: 0.01 10*3/MM3 (ref 0–0.05)
IMM GRANULOCYTES NFR BLD AUTO: 0.3 % (ref 0–0.5)
IRON 24H UR-MRATE: 83 MCG/DL (ref 37–145)
IRON SATN MFR SERPL: 28 % (ref 14–48)
LYMPHOCYTES # BLD AUTO: 0.71 10*3/MM3 (ref 0.7–3.1)
LYMPHOCYTES NFR BLD AUTO: 24.3 % (ref 19.6–45.3)
MCH RBC QN AUTO: 33.5 PG (ref 26.6–33)
MCHC RBC AUTO-ENTMCNC: 32.7 G/DL (ref 31.5–35.7)
MCV RBC AUTO: 102.2 FL (ref 79–97)
MONOCYTES # BLD AUTO: 0.27 10*3/MM3 (ref 0.1–0.9)
MONOCYTES NFR BLD AUTO: 9.2 % (ref 5–12)
NEUTROPHILS NFR BLD AUTO: 1.75 10*3/MM3 (ref 1.7–7)
NEUTROPHILS NFR BLD AUTO: 60 % (ref 42.7–76)
NRBC BLD AUTO-RTO: 0 /100 WBC (ref 0–0.2)
PLATELET # BLD AUTO: 245 10*3/MM3 (ref 140–450)
PMV BLD AUTO: 9.1 FL (ref 6–12)
POTASSIUM SERPL-SCNC: 4.9 MMOL/L (ref 3.5–4.7)
PROT SERPL-MCNC: 7.3 G/DL (ref 6.3–8)
RBC # BLD AUTO: 2.69 10*6/MM3 (ref 3.77–5.28)
SODIUM SERPL-SCNC: 141 MMOL/L (ref 134–145)
TIBC SERPL-MCNC: 301 MCG/DL (ref 249–505)
TRANSFERRIN SERPL-MCNC: 215 MG/DL (ref 200–360)
WBC # BLD AUTO: 2.92 10*3/MM3 (ref 3.4–10.8)

## 2020-12-02 PROCEDURE — 83540 ASSAY OF IRON: CPT

## 2020-12-02 PROCEDURE — 85025 COMPLETE CBC W/AUTO DIFF WBC: CPT

## 2020-12-02 PROCEDURE — 80053 COMPREHEN METABOLIC PANEL: CPT

## 2020-12-02 PROCEDURE — 96372 THER/PROPH/DIAG INJ SC/IM: CPT

## 2020-12-02 PROCEDURE — 36415 COLL VENOUS BLD VENIPUNCTURE: CPT

## 2020-12-02 PROCEDURE — 25010000002 EPOETIN ALFA PER 1000 UNITS: Performed by: INTERNAL MEDICINE

## 2020-12-02 PROCEDURE — 99213 OFFICE O/P EST LOW 20 MIN: CPT | Performed by: NURSE PRACTITIONER

## 2020-12-02 PROCEDURE — 84466 ASSAY OF TRANSFERRIN: CPT

## 2020-12-02 PROCEDURE — 82728 ASSAY OF FERRITIN: CPT

## 2020-12-02 RX ADMIN — ERYTHROPOIETIN 20000 UNITS: 20000 INJECTION, SOLUTION INTRAVENOUS; SUBCUTANEOUS at 09:01

## 2020-12-03 ENCOUNTER — MEDICATION THERAPY MANAGEMENT (OUTPATIENT)
Dept: PHARMACY | Facility: HOSPITAL | Age: 60
End: 2020-12-03

## 2020-12-03 NOTE — PROGRESS NOTES
[facility] Lab Review- Ibrance      12/2/2020   WBC 3.40 - 10.80 10*3/mm3 2.92 (A)   Neutrophils Absolute 1.70 - 7.00 10*3/mm3 1.75   Hemoglobin 12.0 - 15.9 g/dL 9.0 (A)   Hematocrit 34.0 - 46.6 % 27.5 (A)   Platelets 140 - 450 10*3/mm3 245   Creatinine 0.60 - 1.10 mg/dL 2.35 (A)   eGFR Non African Am >60 mL/min/1.73 21 (A)   BUN 6 - 20 mg/dL 30 (A)   Sodium 134 - 145 mmol/L 141   Potassium 3.5 - 4.7 mmol/L 4.9 (A)   Glucose 74 - 124 mg/dL 153 (A)   Calcium 8.5 - 10.2 mg/dL 9.1   Albumin 3.50 - 5.20 g/dL 3.80   Total Protein 6.3 - 8.0 g/dL 7.3   AST (SGOT) 0 - 32 U/L 8   ALT (SGPT) 0 - 33 U/L <5   Alkaline Phosphatase 38 - 116 U/L 73   Total Bilirubin 0.2 - 1.2 mg/dL 0.6   Iron 37 - 145 mcg/dL 83   TIBC 249 - 505 mcg/dL 301   Ferritin 13.00 - 150.00 ng/mL 213.80 (A)   Iron Saturation 14 - 48 % 28     APRN dictation noted. Pharmacy will continue to follow.     Thanks,   Myah Argueta, PharmD

## 2020-12-18 ENCOUNTER — MEDICATION THERAPY MANAGEMENT (OUTPATIENT)
Dept: PHARMACY | Facility: HOSPITAL | Age: 60
End: 2020-12-18

## 2020-12-18 NOTE — PROGRESS NOTES
[facility] telephone follow up- Era Arias is doing well today. She has no new issues or side effects with her Ibrance. Medication administration and adherence seem appropriate; she reports no missed doses this past month. Juana denies any recent medication or supplement changes. Pharmacy will continue to follow.     Thanks,   Myah Argueta, PharmD

## 2021-01-05 RX ORDER — FOLIC ACID 1 MG/1
TABLET ORAL
Qty: 90 TABLET | Refills: 1 | Status: SHIPPED | OUTPATIENT
Start: 2021-01-05 | End: 2021-07-19

## 2021-01-06 ENCOUNTER — OFFICE VISIT (OUTPATIENT)
Dept: ONCOLOGY | Facility: CLINIC | Age: 61
End: 2021-01-06

## 2021-01-06 ENCOUNTER — LAB (OUTPATIENT)
Dept: LAB | Facility: HOSPITAL | Age: 61
End: 2021-01-06

## 2021-01-06 ENCOUNTER — TELEPHONE (OUTPATIENT)
Dept: ONCOLOGY | Facility: CLINIC | Age: 61
End: 2021-01-06

## 2021-01-06 ENCOUNTER — HOSPITAL ENCOUNTER (OUTPATIENT)
Dept: INFUSION THERAPY | Facility: HOSPITAL | Age: 61
Discharge: HOME | End: 2021-01-06

## 2021-01-06 ENCOUNTER — APPOINTMENT (OUTPATIENT)
Dept: ONCOLOGY | Facility: HOSPITAL | Age: 61
End: 2021-01-06

## 2021-01-06 VITALS
OXYGEN SATURATION: 95 % | TEMPERATURE: 97.3 F | HEART RATE: 78 BPM | SYSTOLIC BLOOD PRESSURE: 141 MMHG | WEIGHT: 293 LBS | BODY MASS INDEX: 44.41 KG/M2 | DIASTOLIC BLOOD PRESSURE: 65 MMHG | RESPIRATION RATE: 16 BRPM | HEIGHT: 68 IN

## 2021-01-06 DIAGNOSIS — D64.9 NORMOCYTIC ANEMIA: Primary | ICD-10-CM

## 2021-01-06 DIAGNOSIS — D64.9 NORMOCYTIC ANEMIA: ICD-10-CM

## 2021-01-06 DIAGNOSIS — C50.919 METASTATIC BREAST CANCER: ICD-10-CM

## 2021-01-06 DIAGNOSIS — D63.1 ANEMIA IN STAGE 3 CHRONIC KIDNEY DISEASE, UNSPECIFIED WHETHER STAGE 3A OR 3B CKD (HCC): ICD-10-CM

## 2021-01-06 DIAGNOSIS — N18.30 ANEMIA IN STAGE 3 CHRONIC KIDNEY DISEASE, UNSPECIFIED WHETHER STAGE 3A OR 3B CKD (HCC): ICD-10-CM

## 2021-01-06 LAB
ABO GROUP BLD: NORMAL
ALBUMIN SERPL-MCNC: 3.1 G/DL (ref 3.5–5.2)
ALBUMIN/GLOB SERPL: 0.8 G/DL (ref 1.1–2.4)
ALP SERPL-CCNC: 74 U/L (ref 38–116)
ALT SERPL W P-5'-P-CCNC: 9 U/L (ref 0–33)
ANION GAP SERPL CALCULATED.3IONS-SCNC: 12 MMOL/L (ref 5–15)
AST SERPL-CCNC: 13 U/L (ref 0–32)
BASOPHILS # BLD AUTO: 0.05 10*3/MM3 (ref 0–0.2)
BASOPHILS # BLD AUTO: 0.07 10*3/MM3 (ref 0–0.2)
BASOPHILS NFR BLD AUTO: 1.5 % (ref 0–1.5)
BASOPHILS NFR BLD AUTO: 2 % (ref 0–1.5)
BILIRUB SERPL-MCNC: 0.4 MG/DL (ref 0.2–1.2)
BLD GP AB SCN SERPL QL: NEGATIVE
BUN SERPL-MCNC: 36 MG/DL (ref 6–20)
BUN/CREAT SERPL: 11.5 (ref 7.3–30)
CALCIUM SPEC-SCNC: 9 MG/DL (ref 8.5–10.2)
CANCER AG15-3 SERPL-ACNC: 23 U/ML
CHLORIDE SERPL-SCNC: 107 MMOL/L (ref 98–107)
CO2 SERPL-SCNC: 23 MMOL/L (ref 22–29)
CREAT SERPL-MCNC: 3.14 MG/DL (ref 0.6–1.1)
DEPRECATED RDW RBC AUTO: 62.6 FL (ref 37–54)
DEPRECATED RDW RBC AUTO: 63.3 FL (ref 37–54)
EOSINOPHIL # BLD AUTO: 0.05 10*3/MM3 (ref 0–0.4)
EOSINOPHIL # BLD AUTO: 0.05 10*3/MM3 (ref 0–0.4)
EOSINOPHIL NFR BLD AUTO: 1.4 % (ref 0.3–6.2)
EOSINOPHIL NFR BLD AUTO: 1.5 % (ref 0.3–6.2)
ERYTHROCYTE [DISTWIDTH] IN BLOOD BY AUTOMATED COUNT: 16.4 % (ref 12.3–15.4)
ERYTHROCYTE [DISTWIDTH] IN BLOOD BY AUTOMATED COUNT: 16.5 % (ref 12.3–15.4)
FERRITIN SERPL-MCNC: 430.3 NG/ML (ref 13–150)
FOLATE SERPL-MCNC: >20 NG/ML (ref 4.78–24.2)
GFR SERPL CREATININE-BSD FRML MDRD: 15 ML/MIN/1.73
GLOBULIN UR ELPH-MCNC: 4.1 GM/DL (ref 1.8–3.5)
GLUCOSE SERPL-MCNC: 93 MG/DL (ref 74–124)
HAPTOGLOB SERPL-MCNC: 525 MG/DL (ref 30–200)
HCT VFR BLD AUTO: 22.7 % (ref 34–46.6)
HCT VFR BLD AUTO: 23 % (ref 34–46.6)
HGB BLD-MCNC: 7.1 G/DL (ref 12–15.9)
HGB BLD-MCNC: 7.2 G/DL (ref 12–15.9)
IMM GRANULOCYTES # BLD AUTO: 0.06 10*3/MM3 (ref 0–0.05)
IMM GRANULOCYTES # BLD AUTO: 0.09 10*3/MM3 (ref 0–0.05)
IMM GRANULOCYTES NFR BLD AUTO: 1.7 % (ref 0–0.5)
IMM GRANULOCYTES NFR BLD AUTO: 2.7 % (ref 0–0.5)
IRON 24H UR-MRATE: 47 MCG/DL (ref 37–145)
IRON SATN MFR SERPL: 20 % (ref 14–48)
LDH SERPL-CCNC: 212 U/L (ref 99–259)
LYMPHOCYTES # BLD AUTO: 0.69 10*3/MM3 (ref 0.7–3.1)
LYMPHOCYTES # BLD AUTO: 0.82 10*3/MM3 (ref 0.7–3.1)
LYMPHOCYTES NFR BLD AUTO: 19.9 % (ref 19.6–45.3)
LYMPHOCYTES NFR BLD AUTO: 24.4 % (ref 19.6–45.3)
MCH RBC QN AUTO: 32.7 PG (ref 26.6–33)
MCH RBC QN AUTO: 32.9 PG (ref 26.6–33)
MCHC RBC AUTO-ENTMCNC: 31.3 G/DL (ref 31.5–35.7)
MCHC RBC AUTO-ENTMCNC: 31.3 G/DL (ref 31.5–35.7)
MCV RBC AUTO: 104.5 FL (ref 79–97)
MCV RBC AUTO: 105.1 FL (ref 79–97)
MONOCYTES # BLD AUTO: 0.47 10*3/MM3 (ref 0.1–0.9)
MONOCYTES # BLD AUTO: 0.52 10*3/MM3 (ref 0.1–0.9)
MONOCYTES NFR BLD AUTO: 14 % (ref 5–12)
MONOCYTES NFR BLD AUTO: 15 % (ref 5–12)
NEUTROPHILS NFR BLD AUTO: 1.88 10*3/MM3 (ref 1.7–7)
NEUTROPHILS NFR BLD AUTO: 2.08 10*3/MM3 (ref 1.7–7)
NEUTROPHILS NFR BLD AUTO: 55.9 % (ref 42.7–76)
NEUTROPHILS NFR BLD AUTO: 60 % (ref 42.7–76)
NRBC BLD AUTO-RTO: 0.6 /100 WBC (ref 0–0.2)
NRBC BLD AUTO-RTO: 0.6 /100 WBC (ref 0–0.2)
PLATELET # BLD AUTO: 304 10*3/MM3 (ref 140–450)
PLATELET # BLD AUTO: 306 10*3/MM3 (ref 140–450)
PMV BLD AUTO: 9 FL (ref 6–12)
PMV BLD AUTO: 9.2 FL (ref 6–12)
POTASSIUM SERPL-SCNC: 5.1 MMOL/L (ref 3.5–4.7)
PROT SERPL-MCNC: 7.2 G/DL (ref 6.3–8)
RBC # BLD AUTO: 2.16 10*6/MM3 (ref 3.77–5.28)
RBC # BLD AUTO: 2.2 10*6/MM3 (ref 3.77–5.28)
RH BLD: POSITIVE
SODIUM SERPL-SCNC: 142 MMOL/L (ref 134–145)
T&S EXPIRATION DATE: NORMAL
TIBC SERPL-MCNC: 235 MCG/DL (ref 249–505)
TRANSFERRIN SERPL-MCNC: 168 MG/DL (ref 200–360)
VIT B12 BLD-MCNC: 324 PG/ML (ref 211–946)
WBC # BLD AUTO: 3.36 10*3/MM3 (ref 3.4–10.8)
WBC # BLD AUTO: 3.47 10*3/MM3 (ref 3.4–10.8)

## 2021-01-06 PROCEDURE — 86920 COMPATIBILITY TEST SPIN: CPT

## 2021-01-06 PROCEDURE — 82728 ASSAY OF FERRITIN: CPT | Performed by: INTERNAL MEDICINE

## 2021-01-06 PROCEDURE — 82607 VITAMIN B-12: CPT | Performed by: INTERNAL MEDICINE

## 2021-01-06 PROCEDURE — 85025 COMPLETE CBC W/AUTO DIFF WBC: CPT

## 2021-01-06 PROCEDURE — 99215 OFFICE O/P EST HI 40 MIN: CPT | Performed by: INTERNAL MEDICINE

## 2021-01-06 PROCEDURE — 83615 LACTATE (LD) (LDH) ENZYME: CPT | Performed by: INTERNAL MEDICINE

## 2021-01-06 PROCEDURE — 83010 ASSAY OF HAPTOGLOBIN QUANT: CPT | Performed by: INTERNAL MEDICINE

## 2021-01-06 PROCEDURE — 85025 COMPLETE CBC W/AUTO DIFF WBC: CPT | Performed by: INTERNAL MEDICINE

## 2021-01-06 PROCEDURE — 84466 ASSAY OF TRANSFERRIN: CPT | Performed by: INTERNAL MEDICINE

## 2021-01-06 PROCEDURE — 86300 IMMUNOASSAY TUMOR CA 15-3: CPT | Performed by: INTERNAL MEDICINE

## 2021-01-06 PROCEDURE — 80053 COMPREHEN METABOLIC PANEL: CPT

## 2021-01-06 PROCEDURE — 86900 BLOOD TYPING SEROLOGIC ABO: CPT | Performed by: INTERNAL MEDICINE

## 2021-01-06 PROCEDURE — 36415 COLL VENOUS BLD VENIPUNCTURE: CPT

## 2021-01-06 PROCEDURE — 82746 ASSAY OF FOLIC ACID SERUM: CPT | Performed by: INTERNAL MEDICINE

## 2021-01-06 PROCEDURE — 86850 RBC ANTIBODY SCREEN: CPT | Performed by: INTERNAL MEDICINE

## 2021-01-06 PROCEDURE — 83540 ASSAY OF IRON: CPT | Performed by: INTERNAL MEDICINE

## 2021-01-06 PROCEDURE — 86901 BLOOD TYPING SEROLOGIC RH(D): CPT | Performed by: INTERNAL MEDICINE

## 2021-01-06 RX ORDER — ACETAMINOPHEN 325 MG/1
650 TABLET ORAL ONCE
Status: CANCELLED | OUTPATIENT
Start: 2021-01-06 | End: 2021-01-06

## 2021-01-06 RX ORDER — DIPHENHYDRAMINE HCL 25 MG
25 CAPSULE ORAL ONCE
Status: CANCELLED | OUTPATIENT
Start: 2021-01-06 | End: 2021-01-06

## 2021-01-06 RX ORDER — SODIUM CHLORIDE 9 MG/ML
250 INJECTION, SOLUTION INTRAVENOUS AS NEEDED
Status: CANCELLED | OUTPATIENT
Start: 2021-01-06

## 2021-01-06 RX ORDER — FUROSEMIDE 10 MG/ML
20 INJECTION INTRAMUSCULAR; INTRAVENOUS ONCE
Status: CANCELLED | OUTPATIENT
Start: 2021-01-06 | End: 2021-01-06

## 2021-01-06 NOTE — PROGRESS NOTES
"Chief Complaint  Metastatic lobular breast cancer (ER/SD positive, HER-2/tj negative), anemia secondary to CKD3, CHF, peripheral neuropathy, chemotherapy related nausea chemotherapy-induced myelosuppression    Subjective        History of Present Illness  The patient returns today in follow-up with laboratory studies to review, continuing on Aromasin 25 mg daily and Ibrance 100 mg daily for 21/28 days as well as weekly Procrit.  The patient started a new cycle of Ibrance on 1/4/2020.  Patient reports that she has had difficulties over the past few weeks with feeling very cold, significantly fatigued, and having mild dyspnea on exertion.  She denies any evidence of blood in her stool.  She has some mild chronic nausea related to her Ibrance.  She does have some episodes of emesis in the mornings related to sinus drainage.  She has chronic mild anorexia which is unchanged.      Objective   Vital Signs:   /65   Pulse 78   Temp 97.3 °F (36.3 °C) (Temporal)   Resp 16   Ht 172.7 cm (67.99\")   Wt (!) 174 kg (384 lb 1.6 oz)   SpO2 95%   BMI 58.42 kg/m²     Physical Exam  Constitutional:       Appearance: She is well-developed. She is obese.   Eyes:      Conjunctiva/sclera: Conjunctivae normal.   Neck:      Thyroid: No thyromegaly.   Cardiovascular:      Rate and Rhythm: Normal rate and regular rhythm.      Heart sounds: Murmur present. No friction rub. No gallop.    Pulmonary:      Effort: No respiratory distress.      Breath sounds: Normal breath sounds.   Abdominal:      General: Bowel sounds are normal. There is no distension.      Palpations: Abdomen is soft.      Tenderness: There is no abdominal tenderness.   Musculoskeletal:         General: Swelling present.      Comments: Trace to 1+ bilateral lower extremity edema   Lymphadenopathy:      Head:      Right side of head: No submandibular adenopathy.      Cervical: No cervical adenopathy.      Upper Body:      Right upper body: No supraclavicular " adenopathy.      Left upper body: No supraclavicular adenopathy.   Skin:     General: Skin is warm and dry.      Findings: No bruising or rash.   Neurological:      Mental Status: She is alert and oriented to person, place, and time.      Cranial Nerves: No cranial nerve deficit.      Motor: No abnormal muscle tone.      Deep Tendon Reflexes: Reflexes normal.   Psychiatric:         Behavior: Behavior normal.        Result Review : Reviewed CBC, CMP, B12, haptoglobin, LDH, iron panel, ferritin, CA 15-3 from today.        Assessment and Plan  1. Metastatic lobular breast cancer (ER/MA positive, HER-2/tj negative):  · History of stage IIIC right breast cancer ([id number])  · Patient treated previously in the [facility] system  · Diagnosis via excisional biopsy 8/23/2003 with lobular carcinoma, 4.5 cm, positive margins.  ER/MA positive, HER-2/tj negative.  · Staging evaluation in September 2003 with negative bone scan and negative CT scans.  Ejection fraction 65%.  · Received neoadjuvant chemotherapy (? GET regimen) which apparently included Taxotere and possibly epirubicin.  Received 6 cycles.  · 1/22/2004 bilateral mastectomy with right axillary dissection.  Per available records, 10-12 cm residual invasive lobular carcinoma in the breast with 14/16 lymph nodes positive with extranodal extension.  Immediate reconstruction.  · Received adjuvant chemotherapy with carboplatin and navelbine x4 cycles  · Initiated adjuvant tamoxifen 6/22/2004  · Adjuvant radiation therapy initiated but interrupted due to chest wall cellulitis requiring removal of implants in August 2004  · Implant reconstruction again attempted with MRSA infection, implant removed 12/30/2005 with no further attempts made and reconstruction.  · Completed 5 years tamoxifen in June 2009 and subsequently transitioned to Femara.  Received Femara until June 2014.  · Patient experienced postmenopausal vaginal bleeding in 2013, negative endometrial biopsy and  gynecologic evaluation.  · Patient last seen in [facility] 6/18/2014  · CT chest performed to evaluate bicuspid aortic valve and dilated asending aorta 7/12/2019.  CT showed no change in aorta however showed new free intraperitoneal fluid in the upper abdomen with mesenteric edema, concerning for carcinomatosis.  · CT abdomen and pelvis (without IV contrast) on 7/16/2019 with mesenteric thickening, small volume of complex fluid in the mesentery which was suspicious and possibly representative of carcinomatosis, small amount of perihepatic fluid, small bowel loops tethered to omentum without obstruction, omental thickening, shotty retroperitoneal and pelvic lymph nodes (non-pathologically enlarged).  · PET scan 7/26/2019 with hypermetabolic omental activity, hypermetabolic small retroperitoneal lymph nodes, hypermetabolic activity throughout uterus with increase in size.  · CT-guided omental biopsy 7/30/2019 with metastatic lobular carcinoma of breast primary, ER positive (greater than 95%), CA positive (90%), HER-2/tj negative (1+ IHC)  · Baseline CA 15-3 on 7/22/19 was 32.6  · Initiation of Aromasin 25 mg daily 8/12/2019.  Initiated Ibrance at 100 mg 21/28 days on 8/26/2019.  · Improvement in CA 15-3 on 9/17/19-26.3  · Following 2 cycles of Aromasin and Ibrance, CA 15-3 declined to 25.9 on 10/15/2019.  CT scan chest abdomen pelvis 10/16/2019 showed improvement in the mesenteric and omental thickening and near resolution of complex ascites.  Treatment continued.  · Stable findings on subsequent CT chest abdomen pelvis 12/11/2019.  Further improvement in CA 15-3-23.9 on 12/18/2019.  Ibrance/Aromasin continued.   · Foundation medicine liquid biopsy 2/5/2020: MSI undetermined, mutations in BETH, NF1, CHEK2.  No evidence of PIK3CA mutation.  · Slight increase in CA 15-3 to 27.1 on 2/19/2020 and 29.1 on 3/18/2020.  CT however on 3/13/2020 with no new findings.  · Subsequent improvement in CA 15-3-26.5 on  5/15/2020  · CT 7/31/2020 showed evidence of stable disease.  CA 15-3 declined further to 23.1 on 7/24/2020.  · CT 10/19/2020 with stable disease.  Fluctuations in CA 15-3 of unclear significance.  · Patient returns today in follow-up continuing on Aromasin 25 mg daily, Ibrance 100 mg daily for 21/28 days (started current cycle Ibrance on 1/4/2020).    The patient continues to tolerate treatment very well.  She has however developed over the past few weeks increasing fatigue, dyspnea on exertion, feeling cold.  This appears to be related to her acute worsening of anemia with a hemoglobin of 7.1 today.  Unclear whether this is related to myelosuppression from Ibrance and we will discuss her anemia further below.  The patient did have a CA 15-3 drawn today at 23 which is decreased from previous value of 29.5 on 10/2/2020.  No clinical evidence of recurrent disease.  Patient is scheduled for CT chest abdomen pelvis in 3 weeks prior to her return visit here in 4 weeks.  Continue on with treatment as planned  2. Normocytic anemia in the setting of CKD3:  · The patient appears to have a chronic anemia with hemoglobin in the 10-11 range with normal MCV.  · Patient has underlying CKD3 with baseline creatinine recently in the 1.5-1.8 range.  · Anemia evaluation 7/22/2019 with iron 30, ferritin 85.2, iron saturation 10%, TIBC 311, B12 370, erythropoietin level 20.4  · Anemia felt in large part to be related to CKD3 as well as to underlying malignancy  · Evidence of folate deficiency 8/12/2019 with level 3.84, initiated folic acid 1 mg daily  · Additional labs on 12/18/2019 with iron 73, ferritin 82.2, iron saturation 25%, TIBC 290.  We did discuss the potential use of Procrit if her hemoglobin declines into the low 9/upper 8 range.  · Decline in hemoglobin into the 8 range, iron studies 7/20/2020 with iron 54, ferritin 66.7, iron saturation 16%, TIBC 328.  On 8/7/2020 proceeded with Injectafer 750 mg IV x1 dose.  Second dose  not administered due to onset of fever, subsequent iron studies precluded further administration of IV iron.  · Initiated Procrit 20,000 units every 4 weeks on 9/4/2020.  · Patient returns today continuing on Procrit every 4 weeks 20,000 units for hemoglobin less than 10.  The patient's last dose of Procrit was on 12/2/2020 at 20,000 units for hemoglobin of 9.0.  As noted above, hemoglobin today has declined significantly to 7.1.  This was repeated and verified at 7.2.  There has been no clinical evidence of GI blood loss.  Multiple additional labs performed today with iron 47, ferritin 430, iron saturation 20%, TIBC 235, folate greater than 20, B12 324, haptoglobin 525, .  Renal function is somewhat worse today with creatinine up to 3.14.  Unclear whether this is related to the worsening anemia.  The patient does have a low normal B12 level and we will start oral B12 1000 mcg daily although this is not the etiology of her significant decline in hemoglobin.  I did asked the patient to collect stool cards for occult blood x3.  We will plan transfusion 2 units PRBC tomorrow with 20 mg IV Lasix between units (patient has diagnosis of CHF).  She will return on 1/8/2020 for CBC, stat CMP and RN review and will be seen in 1 week with nurse practitioner visit with the same labs.  We will potentially resume Procrit pending her hemoglobin at that time.  3. CKD3:  · Baseline creatinine recently in 1.6-2.0 range  · On 9/10/2019, RYAN/CKD3 with creatinine up to 2.44, BUN of 40.  Received 1 L normal saline.  Patient with increase in oral fluid intake.  · Renal ultrasound 9/20/2019 with no evidence of obstructive uropathy.  · Diminished oral intake due to nausea from Ibrance, creatinine 2.79 with BUN 43 on 10/15/2019.  Received 1 L normal saline.  Repeat labs on 10/18/2019 with BUN 31, creatinine 2.0.  · During cycle 3 Ibrance, patient developed increase in creatinine on 11/6/2019 to 2.35 and received 1 L normal  saline.  · Patient is trying to maintain adequate oral hydration.    · Gradual escalation of creatinine into the 2-2 0.4 range and subsequently into the 2.5-2.8 range  · Creatinine today is above her previous baseline at 3.14 with BUN at 36.  We will recheck creatinine on 1/8/2020 and again in 1 week.  4. CHF with mild to moderate aortic stenosis:  · Recent cardiology evaluation with echocardiogram 7/12/2019 showing ejection fraction 48% with moderate dilation of the LV cavity, global hypokinesis, grade 2 diastolic dysfunction, mild to moderate aortic stenosis, trivial pericardial effusion.  · CT chest 7/12/2019 with no change in the aorta compared to prior study 12/13/2017.  · No comment on current CT chest 10/19/2020 regarding aorta.  · With transfusion tomorrow, will plan to administer 20 mg IV Lasix between units  5. Left upper and bilateral lower extremity peripheral neuropathy:  · Patient reports that left upper extremity neuropathy was not present from previous chemotherapy but occurred after hospitalization that required intubation and critical care stay.  Apparently felt to represent critical care neuropathy.  Improved over time.  · Bilateral lower extremity neuropathy felt to be related to diabetes  · May have future implications regarding treatment for breast cancer.  6. Uterine/endometrial activity on PET scan:  · Patient experienced postmenopausal vaginal bleeding in 2013, negative endometrial biopsy and gynecologic evaluation.  · With findings on PET scan with enlarging uterus and some hypermetabolic activity, referred back to Dr. Oliveros and gynecology.    · 9/19/2019 D&C with hysteroscopy by Dr. Oliveros, no significant intraoperative findings, pathologic results with benign findings, no evidence of malignancy.  7. Nausea:  · Mild, relieved with Zofran.    8. Myelosuppression secondary to Ibrance:  · With cycle 1, jessica WBC 2.49 with ANC 1.25 and platelet count 140,000.  · With cycle 2, jessica WBC 2.33  with ANC 1.06, maintained normal platelet count  · Today, WBC  3.36 with ANC 1.88  9. Hyperkalemia  · Patient with borderline elevated potassium in the past  · Today, potassium 5.1.  We will recheck potassium on 1/8/2020 and again in 1 week.     Plan:  1. Continue Aromasin 25 mg daily.    2. Continue Ibrance 100 mg daily for 21/28 days (current cycle Ibrance initiated on 1/4/2020)  3. Continue oral folic acid 1 mg daily  4. Transfuse 2 unit PRBC tomorrow with 20 mg IV Lasix between units  5. The patient will collect and return stool cards for occult blood x3  6. On 1/8/2020, CBC with stat CMP and RN review  7. In 1 week nurse practitioner visit with CBC, stat CMP and possible Procrit  8. In 3 weeks CT chest abdomen pelvis as previously scheduled  9. In 4 weeks MD visit with CBC, CMP as previously scheduled.     Patient continues on high risk medication requiring intensive monitoring.

## 2021-01-06 NOTE — TELEPHONE ENCOUNTER
Caller: AZAEL ALEXANDER    Relationship to patient: SELF    Best call back number: [phone]    PT WANTS TO MOVE THE TIME OF HER APPT ON FRI 01/08 FROM 2:30PM TO EARLIER IN THE DAY   Left message for patient to return call.

## 2021-01-07 ENCOUNTER — HOSPITAL ENCOUNTER (OUTPATIENT)
Dept: INFUSION THERAPY | Facility: HOSPITAL | Age: 61
Setting detail: INFUSION SERIES
Discharge: HOME | End: 2021-01-07

## 2021-01-07 ENCOUNTER — TELEPHONE (OUTPATIENT)
Dept: ONCOLOGY | Facility: CLINIC | Age: 61
End: 2021-01-07

## 2021-01-07 VITALS
TEMPERATURE: 98 F | DIASTOLIC BLOOD PRESSURE: 79 MMHG | SYSTOLIC BLOOD PRESSURE: 140 MMHG | OXYGEN SATURATION: 97 % | HEART RATE: 65 BPM | RESPIRATION RATE: 16 BRPM

## 2021-01-07 DIAGNOSIS — D64.9 NORMOCYTIC ANEMIA: ICD-10-CM

## 2021-01-07 PROCEDURE — 96374 THER/PROPH/DIAG INJ IV PUSH: CPT

## 2021-01-07 PROCEDURE — 36430 TRANSFUSION BLD/BLD COMPNT: CPT

## 2021-01-07 PROCEDURE — 63710000001 DIPHENHYDRAMINE PER 50 MG: Performed by: INTERNAL MEDICINE

## 2021-01-07 PROCEDURE — 86900 BLOOD TYPING SEROLOGIC ABO: CPT

## 2021-01-07 PROCEDURE — P9016 RBC LEUKOCYTES REDUCED: HCPCS

## 2021-01-07 PROCEDURE — 25010000002 FUROSEMIDE PER 20 MG: Performed by: INTERNAL MEDICINE

## 2021-01-07 RX ORDER — FUROSEMIDE 10 MG/ML
20 INJECTION INTRAMUSCULAR; INTRAVENOUS ONCE
Status: COMPLETED | OUTPATIENT
Start: 2021-01-07 | End: 2021-01-07

## 2021-01-07 RX ORDER — ACETAMINOPHEN 325 MG/1
650 TABLET ORAL ONCE
Status: COMPLETED | OUTPATIENT
Start: 2021-01-07 | End: 2021-01-07

## 2021-01-07 RX ORDER — SODIUM CHLORIDE 9 MG/ML
250 INJECTION, SOLUTION INTRAVENOUS AS NEEDED
Status: DISCONTINUED | OUTPATIENT
Start: 2021-01-07 | End: 2021-01-09 | Stop reason: HOSPADM

## 2021-01-07 RX ORDER — DIPHENHYDRAMINE HCL 25 MG
25 CAPSULE ORAL ONCE
Status: COMPLETED | OUTPATIENT
Start: 2021-01-07 | End: 2021-01-07

## 2021-01-07 RX ADMIN — DIPHENHYDRAMINE HYDROCHLORIDE 25 MG: 25 CAPSULE ORAL at 09:32

## 2021-01-07 RX ADMIN — FUROSEMIDE 20 MG: 10 INJECTION, SOLUTION INTRAMUSCULAR; INTRAVENOUS at 13:00

## 2021-01-07 RX ADMIN — ACETAMINOPHEN 650 MG: 325 TABLET, FILM COATED ORAL at 09:32

## 2021-01-07 NOTE — TELEPHONE ENCOUNTER
Called pt. Informed her to start B12. She V/U.   ----- Message from Leodan Irvin Jr., MD sent at 1/7/2021  7:54 AM EST -----  Can you ask the patient to begin B12 1000mcg daily OTC? Thanks!  ----- Message -----  From: Lab, Background User  Sent: 1/6/2021   9:44 AM EST  To: Leodan Irvin Jr., MD

## 2021-01-07 NOTE — PROGRESS NOTES
Patient given hot lunch tray. Stand by assist to BR. Patient tolerated transfusions without s/s of reaction. Patient discharged via w/c per staff to main entrance to meet spouse at vehicle with AVS.

## 2021-01-07 NOTE — PATIENT INSTRUCTIONS
"https://www.[facility].org/donate-blood/blood-donation-process/what-happens-to-donated-blood/blood-transfusions/types-of-blood-transfusions.html\"> https://www.hematology.org/education/patients/blood-basics/blood-safety-and-matching\"> https://www.nhlbi.nih.gov/health-topics/blood-transfusion\">   Blood Transfusion, Adult  A blood transfusion is a procedure in which you receive blood or a type of blood cell (blood component) through an IV. You may need a blood transfusion when your blood level is low. This may result from a bleeding disorder, illness, injury, or surgery. The blood may come from a donor. You may also be able to donate blood for yourself (autologous blood donation) before a planned surgery.  The blood given in a transfusion is made up of different blood components. You may receive:  · Red blood cells. These carry oxygen to the cells in the body.  · Platelets. These help your blood to clot.  · Plasma. This is the liquid part of your blood. It carries proteins and other substances throughout the body.  · White blood cells. These help you fight infections.  If you have hemophilia or another clotting disorder, you may also receive other types of blood products.  Tell a health care provider about:  · Any blood disorders you have.  · Any previous reactions you have had during a blood transfusion.  · Any allergies you have.  · All medicines you are taking, including vitamins, herbs, eye drops, creams, and over-the-counter medicines.  · Any surgeries you have had.  · Any medical conditions you have, including any recent fever or cold symptoms.  · Whether you are pregnant or may be pregnant.  What are the risks?  Generally, this is a safe procedure. However, problems may occur.  · The most common problems include:  ? A mild allergic reaction, such as red, swollen areas of skin (hives) and itching.  ? Fever or chills. This may be the body's response to new blood cells received. This may occur during or up to 4 " hours after the transfusion.  · More serious problems may include:  ? Transfusion-associated circulatory overload (TACO), or too much fluid in the lungs. This may cause breathing problems.  ? A serious allergic reaction, such as difficulty breathing or swelling around the face and lips.  ? Transfusion-related acute lung injury (TRALI), which causes breathing difficulty and low oxygen in the blood. This can occur within hours of the transfusion or several days later.  ? Iron overload. This can happen after receiving many blood transfusions over a period of time.  ? Infection or virus being transmitted. This is rare because donated blood is carefully tested before it is given.  ? Hemolytic transfusion reaction. This is rare. It happens when your body's defense system (immune system)tries to attack the new blood cells. Symptoms may include fever, chills, nausea, low blood pressure, and low back or chest pain.  ? Transfusion-associated suptd-nrwvsy-bmza disease (TAGVHD). This is rare. It happens when donated cells attack your body's healthy tissues.  What happens before the procedure?  Medicines  Ask your health care provider about:  · Changing or stopping your regular medicines. This is especially important if you are taking diabetes medicines or blood thinners.  · Taking medicines such as aspirin and ibuprofen. These medicines can thin your blood. Do not take these medicines unless your health care provider tells you to take them.  · Taking over-the-counter medicines, vitamins, herbs, and supplements.  General instructions  · Follow instructions from your health care provider about eating and drinking restrictions.  · You will have a blood test to determine your blood type. This is necessary to know what kind of blood your body will accept and to match it to the donor blood.  · If you are going to have a planned surgery, you may be able to do an autologous blood donation. This may be done in case you need to have a  transfusion.  · You will have your temperature, blood pressure, and pulse monitored before the transfusion.  · If you have had an allergic reaction to a transfusion in the past, you may be given medicine to help prevent a reaction. This medicine may be given to you by mouth (orally) or through an IV.  · Set aside time for the blood transfusion. This procedure generally takes 1-4 hours to complete.  What happens during the procedure?    · An IV will be inserted into one of your veins.  · The bag of donated blood will be attached to your IV. The blood will then enter through your vein.  · Your temperature, blood pressure, and pulse will be monitored regularly during the transfusion. This monitoring is done to detect early signs of a transfusion reaction.  · Tell your nurse right away if you have any of these symptoms during the transfusion:  ? Shortness of breath or trouble breathing.  ? Chest or back pain.  ? Fever or chills.  ? Hives or itching.  · If you have any signs or symptoms of a reaction, your transfusion will be stopped and you may be given medicine.  · When the transfusion is complete, your IV will be removed.  · Pressure may be applied to the IV site for a few minutes.  · A bandage (dressing)will be applied.  The procedure may vary among health care providers and hospitals.  What happens after the procedure?  · Your temperature, blood pressure, pulse, breathing rate, and blood oxygen level will be monitored until you leave the hospital or clinic.  · Your blood may be tested to see how you are responding to the transfusion.  · You may be warmed with fluids or blankets to maintain a normal body temperature.  · If you receive your blood transfusion in an outpatient setting, you will be told whom to contact to report any reactions.  Where to find more information  For more information on blood transfusions, visit the [place]: [facility]org  Summary  · A blood transfusion is a procedure in which you  receive blood or a type of blood cell (blood component) through an IV.  · The blood you receive may come from a donor or be donated by yourself (autologous blood donation) before a planned surgery.  · The blood given in a transfusion is made up of different blood components. You may receive red blood cells, platelets, plasma, or white blood cells depending on the condition treated.  · Your temperature, blood pressure, and pulse will be monitored before, during, and after the transfusion.  · After the transfusion, your blood may be tested to see how your body has responded.  This information is not intended to replace advice given to you by your health care provider. Make sure you discuss any questions you have with your health care provider.  Document Revised: 06/11/2020 Document Reviewed: 06/11/2020  Elsevier Patient Education © 2020 [employer]

## 2021-01-08 ENCOUNTER — APPOINTMENT (OUTPATIENT)
Dept: ONCOLOGY | Facility: HOSPITAL | Age: 61
End: 2021-01-08

## 2021-01-08 ENCOUNTER — APPOINTMENT (OUTPATIENT)
Dept: LAB | Facility: HOSPITAL | Age: 61
End: 2021-01-08

## 2021-01-08 ENCOUNTER — LAB (OUTPATIENT)
Dept: LAB | Facility: HOSPITAL | Age: 61
End: 2021-01-08

## 2021-01-08 ENCOUNTER — INFUSION (OUTPATIENT)
Dept: ONCOLOGY | Facility: HOSPITAL | Age: 61
End: 2021-01-08

## 2021-01-08 DIAGNOSIS — D64.9 NORMOCYTIC ANEMIA: ICD-10-CM

## 2021-01-08 DIAGNOSIS — C50.919 METASTATIC BREAST CANCER: ICD-10-CM

## 2021-01-08 LAB
ALBUMIN SERPL-MCNC: 3.4 G/DL (ref 3.5–5.2)
ALBUMIN/GLOB SERPL: 0.7 G/DL (ref 1.1–2.4)
ALP SERPL-CCNC: 84 U/L (ref 38–116)
ALT SERPL W P-5'-P-CCNC: 9 U/L (ref 0–33)
ANION GAP SERPL CALCULATED.3IONS-SCNC: 14.4 MMOL/L (ref 5–15)
AST SERPL-CCNC: 13 U/L (ref 0–32)
BASOPHILS # BLD AUTO: 0.12 10*3/MM3 (ref 0–0.2)
BASOPHILS NFR BLD AUTO: 1.9 % (ref 0–1.5)
BH BB BLOOD EXPIRATION DATE: NORMAL
BH BB BLOOD EXPIRATION DATE: NORMAL
BH BB BLOOD TYPE BARCODE: 5100
BH BB BLOOD TYPE BARCODE: 5100
BH BB DISPENSE STATUS: NORMAL
BH BB DISPENSE STATUS: NORMAL
BH BB PRODUCT CODE: NORMAL
BH BB PRODUCT CODE: NORMAL
BH BB UNIT NUMBER: NORMAL
BH BB UNIT NUMBER: NORMAL
BILIRUB SERPL-MCNC: 0.7 MG/DL (ref 0.2–1.2)
BUN SERPL-MCNC: 31 MG/DL (ref 6–20)
BUN/CREAT SERPL: 10.2 (ref 7.3–30)
CALCIUM SPEC-SCNC: 9.2 MG/DL (ref 8.5–10.2)
CHLORIDE SERPL-SCNC: 103 MMOL/L (ref 98–107)
CO2 SERPL-SCNC: 23.6 MMOL/L (ref 22–29)
CREAT SERPL-MCNC: 3.04 MG/DL (ref 0.6–1.1)
CROSSMATCH INTERPRETATION: NORMAL
CROSSMATCH INTERPRETATION: NORMAL
DEPRECATED RDW RBC AUTO: 66.3 FL (ref 37–54)
EOSINOPHIL # BLD AUTO: 0.07 10*3/MM3 (ref 0–0.4)
EOSINOPHIL NFR BLD AUTO: 1.1 % (ref 0.3–6.2)
ERYTHROCYTE [DISTWIDTH] IN BLOOD BY AUTOMATED COUNT: 17.8 % (ref 12.3–15.4)
GFR SERPL CREATININE-BSD FRML MDRD: 16 ML/MIN/1.73
GLOBULIN UR ELPH-MCNC: 4.6 GM/DL (ref 1.8–3.5)
GLUCOSE SERPL-MCNC: 191 MG/DL (ref 74–124)
HCT VFR BLD AUTO: 29.6 % (ref 34–46.6)
HGB BLD-MCNC: 9.4 G/DL (ref 12–15.9)
IMM GRANULOCYTES # BLD AUTO: 0.07 10*3/MM3 (ref 0–0.05)
IMM GRANULOCYTES NFR BLD AUTO: 1.1 % (ref 0–0.5)
LYMPHOCYTES # BLD AUTO: 0.91 10*3/MM3 (ref 0.7–3.1)
LYMPHOCYTES NFR BLD AUTO: 14.5 % (ref 19.6–45.3)
MCH RBC QN AUTO: 32.3 PG (ref 26.6–33)
MCHC RBC AUTO-ENTMCNC: 31.8 G/DL (ref 31.5–35.7)
MCV RBC AUTO: 101.7 FL (ref 79–97)
MONOCYTES # BLD AUTO: 0.52 10*3/MM3 (ref 0.1–0.9)
MONOCYTES NFR BLD AUTO: 8.3 % (ref 5–12)
NEUTROPHILS NFR BLD AUTO: 4.59 10*3/MM3 (ref 1.7–7)
NEUTROPHILS NFR BLD AUTO: 73.1 % (ref 42.7–76)
NRBC BLD AUTO-RTO: 0.5 /100 WBC (ref 0–0.2)
PLATELET # BLD AUTO: 284 10*3/MM3 (ref 140–450)
PMV BLD AUTO: 9.1 FL (ref 6–12)
POTASSIUM SERPL-SCNC: 5.1 MMOL/L (ref 3.5–4.7)
PROT SERPL-MCNC: 8 G/DL (ref 6.3–8)
RBC # BLD AUTO: 2.91 10*6/MM3 (ref 3.77–5.28)
SODIUM SERPL-SCNC: 141 MMOL/L (ref 134–145)
UNIT  ABO: NORMAL
UNIT  ABO: NORMAL
UNIT  RH: NORMAL
UNIT  RH: NORMAL
WBC # BLD AUTO: 6.28 10*3/MM3 (ref 3.4–10.8)

## 2021-01-08 PROCEDURE — 36415 COLL VENOUS BLD VENIPUNCTURE: CPT

## 2021-01-08 PROCEDURE — 85025 COMPLETE CBC W/AUTO DIFF WBC: CPT

## 2021-01-08 PROCEDURE — 80053 COMPREHEN METABOLIC PANEL: CPT

## 2021-01-08 PROCEDURE — G0463 HOSPITAL OUTPT CLINIC VISIT: HCPCS

## 2021-01-08 NOTE — NURSING NOTE
Lab Results   Component Value Date    WBC 6.28 01/08/2021    HGB 9.4 (L) 01/08/2021    HCT 29.6 (L) 01/08/2021    .7 (H) 01/08/2021     01/08/2021     Lab Results   Component Value Date    GLUCOSE 191 (H) 01/08/2021    BUN 31 (H) 01/08/2021    CREATININE 3.04 (C) 01/08/2021    EGFRIFNONA 16 (L) 01/08/2021    EGFRIFAFRI 35 (L) 03/16/2019    BCR 10.2 01/08/2021    K 5.1 (H) 01/08/2021    CO2 23.6 01/08/2021    CALCIUM 9.2 01/08/2021    PROTENTOTREF 7.0 03/16/2019    ALBUMIN 3.40 (L) 01/08/2021    LABIL2 1.2 03/16/2019    AST 13 01/08/2021    ALT 9 01/08/2021     Reviewed CMP with  due to elevated creat and potassium. Does not want to give additional fluids today due to hx of CHF and getting blood product yesterday. Yolanda in lab to fax over CMP results to patients nephrologist Annabelle Hernandez MD. Went over all of this with pt who voiced no questions or concerns. Will return 1/12 for labs and NP visit.

## 2021-01-11 ENCOUNTER — OFFICE VISIT (OUTPATIENT)
Dept: CARDIOLOGY | Facility: CLINIC | Age: 61
End: 2021-01-11

## 2021-01-11 VITALS
WEIGHT: 293 LBS | SYSTOLIC BLOOD PRESSURE: 116 MMHG | HEART RATE: 71 BPM | HEIGHT: 67 IN | DIASTOLIC BLOOD PRESSURE: 68 MMHG | BODY MASS INDEX: 45.99 KG/M2 | OXYGEN SATURATION: 99 %

## 2021-01-11 DIAGNOSIS — Q23.1 CONGENITAL BICUSPID AORTIC VALVE: Primary | ICD-10-CM

## 2021-01-11 DIAGNOSIS — C80.0 CARCINOMATOSIS (HCC): ICD-10-CM

## 2021-01-11 DIAGNOSIS — I35.0 NONRHEUMATIC AORTIC VALVE STENOSIS: ICD-10-CM

## 2021-01-11 DIAGNOSIS — I77.810 ASCENDING AORTA DILATATION (HCC): ICD-10-CM

## 2021-01-11 PROCEDURE — 99214 OFFICE O/P EST MOD 30 MIN: CPT | Performed by: NURSE PRACTITIONER

## 2021-01-11 RX ORDER — AMOXICILLIN 500 MG/1
CAPSULE ORAL
Qty: 4 CAPSULE | Refills: 1 | Status: SHIPPED | OUTPATIENT
Start: 2021-01-11 | End: 2021-05-04 | Stop reason: HOSPADM

## 2021-01-11 RX ORDER — LANOLIN ALCOHOL/MO/W.PET/CERES
1000 CREAM (GRAM) TOPICAL DAILY
COMMUNITY

## 2021-01-11 RX ORDER — CARVEDILOL 3.12 MG/1
3.12 TABLET ORAL 2 TIMES DAILY
Qty: 180 TABLET | Refills: 3 | Status: SHIPPED | OUTPATIENT
Start: 2021-01-11 | End: 2021-07-19 | Stop reason: SDUPTHER

## 2021-01-11 NOTE — TELEPHONE ENCOUNTER
Ibrance refill request rec electronically from [facility]. Per last office note-pt is to continue 100 mg daily 21 out of 28 days.    I have routed the rx to Dr Irvin for signature. Once signed it will be escribed to [facility]

## 2021-01-11 NOTE — PROGRESS NOTES
"    CARDIOLOGY  BETY Jones  Primary Cardiologist: Krish Harkins MD      ENCOUNTER DATE:  01/11/2021    Juana Hall / 60 y.o. / female        CHIEF COMPLAINT / REASON FOR OFFICE VISIT     Follow-up (congenital bicuspid aortic valve / 6 month f/u)      HISTORY OF PRESENT ILLNESS       HPI  Juana Hall is a 60 y.o. female who presents today for semiannual follow-up.     Pt has a  history significant for bicuspid aortic valve, mild aortic valve stenosis, mildly dilated ascending aorta, metastatic breast cancer.    Patient reports today for routine evaluation.  States that she has done well over the past 6 months.  Patient is being treated by oncology for metastatic breast cancer.  Reports a prognosis is still poor.  Patient reports that she has been tolerating her chemotherapy well.  She did require a blood transfusion last week secondary to low hemoglobin level.  She was fatigued as a result but reports that this is since improved.  From cardiovascular standpoint patient is stable and denies any episodes of chest discomfort, palpitations, lightheadedness, lower extremity edema, increased fatigue.  Blood pressure has been controlled at home.      The following portions of the patient's history were reviewed and updated as appropriate: allergies, current medications, past family history, past medical history, past social history, past surgical history and problem list.      VITAL SIGNS     Visit Vitals  /68 (BP Location: Left arm, Patient Position: Sitting, Cuff Size: Large Adult)   Pulse 71   Ht 170.2 cm (67\")   Wt (!) 176 kg (388 lb)   LMP  (LMP Unknown)   SpO2 99%   BMI 60.77 kg/m²         Wt Readings from Last 3 Encounters:   01/11/21 (!) 176 kg (388 lb)   01/06/21 (!) 174 kg (384 lb 1.6 oz)   12/02/20 (!) 178 kg (392 lb 1.6 oz)     Body mass index is 60.77 kg/m².      REVIEW OF SYSTEMS   Review of Systems   Constitution: Negative for malaise/fatigue.   Cardiovascular: Positive for " dyspnea on exertion. Negative for chest pain and leg swelling.   Respiratory: Positive for shortness of breath.    Neurological: Negative for light-headedness.   All other systems reviewed and are negative.          PHYSICAL EXAMINATION     Vitals signs and nursing note reviewed.   Constitutional:       Appearance: Normal appearance. Well-developed. Morbidly obese.   Eyes:      Conjunctiva/sclera: Conjunctivae normal.   Neck:      Vascular: No carotid bruit.   Pulmonary:      Breath sounds: Normal breath sounds.   Cardiovascular:      Normal rate. Regular rhythm. Normal S1 with normal intensity. Normal S2 with normal intensity.      Murmurs: There is a grade 3/6 blowing systolic murmur at the URSB and ULSB.      No gallop. No click. No rub.   Musculoskeletal: Normal range of motion.   Skin:     General: Skin is warm and dry.   Neurological:      Mental Status: Alert and oriented to person, place, and time.      GCS: GCS eye subscore is 4. GCS verbal subscore is 5. GCS motor subscore is 6.   Psychiatric:         Speech: Speech normal.         Behavior: Behavior normal.         Thought Content: Thought content normal.         Judgment: Judgment normal.           REVIEWED DATA     Procedures    Cardiac Procedures:  1. Echocardiogram on 10/23/2017: The ejection fraction was low normal at 50-55%. There was mild LVH.  There was grade 1 diastolic dysfunction.  The left atrium was mildly dilated.  A bicuspid aortic valve could not be excluded.  The aortic valve leaflets were heavily calcified.  There was mild aortic stenosis with a peak gradient of 19 mmHg, and a mean gradient of 11 mmHg.  2. CT scan of the chest without contrast on 12/13/2017: The ascending aorta was mildly dilated at 3.8 cm.  There was no aortic aneurysm.  3. Echocardiogram on 7/12/2019: The left ventricle was mildly to moderately enlarged.  There was borderline global hypokinesis.  The calculated ejection fraction was 48%.  There was grade 2 diastolic  dysfunction.  The right ventricle was normal.  The aortic valve was bicuspid and heavily calcified.  There was mild to moderate aortic stenosis with a peak pressure of 28 mmHg and a mean pressure of 17 mmHg.  There were several calcified chordae.  There was mild mitral regurgitation.      ASSESSMENT & PLAN      Diagnosis Plan   1. Congenital bicuspid aortic valve  Adult Transthoracic Echo Complete W/ Cont if Necessary Per Protocol   2. Nonrheumatic aortic valve stenosis  Adult Transthoracic Echo Complete W/ Cont if Necessary Per Protocol   3. Ascending aorta dilatation (CMS/HCC)  Adult Transthoracic Echo Complete W/ Cont if Necessary Per Protocol   4. Carcinomatosis (CMS/HCC)           SUMMARY/DISCUSSION  1. Congenital bicuspid aortic valve with aortic valve stenosis.  Has not been assessed since July 2019.  Patient due for routine surveillance.  She is currently asymptomatic from aortic valve stenosis and denies any increased shortness of breath from baseline, near syncope, chest discomfort.  Patient will get repeat echocardiogram in 6 months at her routine appointment.  2. Ascending aorta dilation.-July 2019.  Will reassess on echocardiogram.  3. Metastatic breast cancer with carcinomatosis.  Following oncology.  Tolerating chemotherapy.  Prognosis poor.  4. Refills for medications provided.  Patient will get routine echocardiogram with follow-up with Dr. Harkins in 6 months.        MEDICATIONS         Discharge Medications          Accurate as of January 11, 2021 11:55 AM. If you have any questions, ask your nurse or doctor.            New Medications      Instructions Start Date   Palbociclib 100 MG tablet tablet  Replaces: Palbociclib 100 MG capsule capsule  Started by: Leodan Irvin MD   TAKE 1 TABLET BY MOUTH DAILY FOR 21 DAYS ON THEN 7 DAYS OFF         Continue These Medications      Instructions Start Date   amLODIPine 10 MG tablet  Commonly known as: NORVASC   10 mg, Oral, Every Morning       amoxicillin 500 MG capsule  Commonly known as: AMOXIL   Take 4 capsules by mouth 1 hour prior to dental appointment      aspirin 81 MG EC tablet   81 mg, Oral, Daily, HELD FOR SURGERY      atorvastatin 40 MG tablet  Commonly known as: LIPITOR   40 mg, Oral, Daily      carvedilol 3.125 MG tablet  Commonly known as: COREG   3.125 mg, Oral, 2 Times Daily      Dulaglutide 1.5 MG/0.5ML solution pen-injector   1.5 mg, Subcutaneous, Weekly, MONDAYS      DULoxetine 60 MG capsule  Commonly known as: CYMBALTA   60 mg, Oral, Daily      exemestane 25 MG chemo tablet  Commonly known as: AROMASIN   TAKE ONE TABLET BY MOUTH DAILY      folic acid 1 MG tablet  Commonly known as: FOLVITE   TAKE ONE TABLET BY MOUTH DAILY      Levemir FlexTouch 100 UNIT/ML injection  Generic drug: insulin detemir   50 Units, Subcutaneous, 2 Times Daily      levothyroxine 50 MCG tablet  Commonly known as: SYNTHROID, LEVOTHROID   50 mcg, Oral, Daily      ondansetron 8 MG tablet  Commonly known as: Zofran   8 mg, Oral, Every 8 Hours PRN      repaglinide 2 MG tablet  Commonly known as: PRANDIN   2 mg, Oral, 3 Times Daily Before Meals      vitamin B-12 1000 MCG tablet  Commonly known as: CYANOCOBALAMIN   1,000 mcg, Oral, Daily      vitamin D 1.25 MG (14962 UT) capsule capsule  Commonly known as: ERGOCALCIFEROL   50,000 Units, Every 7 Days         Stop These Medications    Palbociclib 100 MG capsule capsule  Commonly known as: IBRANCE  Replaced by: Palbociclib 100 MG tablet tablet  Stopped by: Leodan Irvin MD                **Dragon Disclaimer:   Much of this encounter note is an electronic transcription/translation of spoken language to printed text. The electronic translation of spoken language may permit erroneous, or at times, nonsensical words or phrases to be inadvertently transcribed. Although I have reviewed the note for such errors, some may still exist.

## 2021-01-12 ENCOUNTER — TELEPHONE (OUTPATIENT)
Dept: ONCOLOGY | Facility: CLINIC | Age: 61
End: 2021-01-12

## 2021-01-12 ENCOUNTER — LAB (OUTPATIENT)
Dept: LAB | Facility: HOSPITAL | Age: 61
End: 2021-01-12

## 2021-01-12 ENCOUNTER — OFFICE VISIT (OUTPATIENT)
Dept: ONCOLOGY | Facility: CLINIC | Age: 61
End: 2021-01-12

## 2021-01-12 VITALS
HEIGHT: 67 IN | HEART RATE: 84 BPM | BODY MASS INDEX: 45.99 KG/M2 | DIASTOLIC BLOOD PRESSURE: 75 MMHG | SYSTOLIC BLOOD PRESSURE: 149 MMHG | TEMPERATURE: 98.2 F | WEIGHT: 293 LBS | OXYGEN SATURATION: 93 %

## 2021-01-12 DIAGNOSIS — R79.89 ELEVATED SERUM CREATININE: ICD-10-CM

## 2021-01-12 DIAGNOSIS — N18.30 ANEMIA IN STAGE 3 CHRONIC KIDNEY DISEASE, UNSPECIFIED WHETHER STAGE 3A OR 3B CKD (HCC): ICD-10-CM

## 2021-01-12 DIAGNOSIS — E87.5 HYPERKALEMIA: ICD-10-CM

## 2021-01-12 DIAGNOSIS — D63.1 ANEMIA IN STAGE 3 CHRONIC KIDNEY DISEASE, UNSPECIFIED WHETHER STAGE 3A OR 3B CKD (HCC): ICD-10-CM

## 2021-01-12 DIAGNOSIS — C50.919 METASTATIC BREAST CANCER: Primary | ICD-10-CM

## 2021-01-12 DIAGNOSIS — C50.919 METASTATIC BREAST CANCER: ICD-10-CM

## 2021-01-12 DIAGNOSIS — D64.9 NORMOCYTIC ANEMIA: ICD-10-CM

## 2021-01-12 LAB
ALBUMIN SERPL-MCNC: 3.3 G/DL (ref 3.5–5.2)
ALBUMIN/GLOB SERPL: 0.8 G/DL (ref 1.1–2.4)
ALP SERPL-CCNC: 78 U/L (ref 38–116)
ALT SERPL W P-5'-P-CCNC: 7 U/L (ref 0–33)
ANION GAP SERPL CALCULATED.3IONS-SCNC: 12.5 MMOL/L (ref 5–15)
AST SERPL-CCNC: 10 U/L (ref 0–32)
BASOPHILS # BLD AUTO: 0.1 10*3/MM3 (ref 0–0.2)
BASOPHILS NFR BLD AUTO: 2.7 % (ref 0–1.5)
BILIRUB SERPL-MCNC: 0.7 MG/DL (ref 0.2–1.2)
BUN SERPL-MCNC: 33 MG/DL (ref 6–20)
BUN/CREAT SERPL: 10.5 (ref 7.3–30)
CALCIUM SPEC-SCNC: 9.1 MG/DL (ref 8.5–10.2)
CHLORIDE SERPL-SCNC: 105 MMOL/L (ref 98–107)
CO2 SERPL-SCNC: 23.5 MMOL/L (ref 22–29)
COLLECT DATE SP2 STL: NORMAL
COLLECT DATE SP3 STL: NORMAL
COLLECT DATE STL: NORMAL
CREAT SERPL-MCNC: 3.13 MG/DL (ref 0.6–1.1)
DEPRECATED RDW RBC AUTO: 62.6 FL (ref 37–54)
EOSINOPHIL # BLD AUTO: 0.07 10*3/MM3 (ref 0–0.4)
EOSINOPHIL NFR BLD AUTO: 1.9 % (ref 0.3–6.2)
ERYTHROCYTE [DISTWIDTH] IN BLOOD BY AUTOMATED COUNT: 16.6 % (ref 12.3–15.4)
GFR SERPL CREATININE-BSD FRML MDRD: 15 ML/MIN/1.73
GLOBULIN UR ELPH-MCNC: 4.3 GM/DL (ref 1.8–3.5)
GLUCOSE SERPL-MCNC: 212 MG/DL (ref 74–124)
HCT VFR BLD AUTO: 28.9 % (ref 34–46.6)
HEMOCCULT STL QL: NEGATIVE
HGB BLD-MCNC: 9 G/DL (ref 12–15.9)
IMM GRANULOCYTES # BLD AUTO: 0.02 10*3/MM3 (ref 0–0.05)
IMM GRANULOCYTES NFR BLD AUTO: 0.5 % (ref 0–0.5)
LYMPHOCYTES # BLD AUTO: 0.6 10*3/MM3 (ref 0.7–3.1)
LYMPHOCYTES NFR BLD AUTO: 16 % (ref 19.6–45.3)
Lab: 12
MCH RBC QN AUTO: 32 PG (ref 26.6–33)
MCHC RBC AUTO-ENTMCNC: 31.1 G/DL (ref 31.5–35.7)
MCV RBC AUTO: 102.8 FL (ref 79–97)
MONOCYTES # BLD AUTO: 0.16 10*3/MM3 (ref 0.1–0.9)
MONOCYTES NFR BLD AUTO: 4.3 % (ref 5–12)
NEUTROPHILS NFR BLD AUTO: 2.8 10*3/MM3 (ref 1.7–7)
NEUTROPHILS NFR BLD AUTO: 74.6 % (ref 42.7–76)
NRBC BLD AUTO-RTO: 0 /100 WBC (ref 0–0.2)
PLATELET # BLD AUTO: 273 10*3/MM3 (ref 140–450)
PMV BLD AUTO: 8.7 FL (ref 6–12)
POTASSIUM SERPL-SCNC: 5.3 MMOL/L (ref 3.5–4.7)
PROT SERPL-MCNC: 7.6 G/DL (ref 6.3–8)
RBC # BLD AUTO: 2.81 10*6/MM3 (ref 3.77–5.28)
SODIUM SERPL-SCNC: 141 MMOL/L (ref 134–145)
WBC # BLD AUTO: 3.75 10*3/MM3 (ref 3.4–10.8)

## 2021-01-12 PROCEDURE — 85025 COMPLETE CBC W/AUTO DIFF WBC: CPT

## 2021-01-12 PROCEDURE — 99214 OFFICE O/P EST MOD 30 MIN: CPT | Performed by: NURSE PRACTITIONER

## 2021-01-12 PROCEDURE — 36415 COLL VENOUS BLD VENIPUNCTURE: CPT

## 2021-01-12 PROCEDURE — 80053 COMPREHEN METABOLIC PANEL: CPT

## 2021-01-12 PROCEDURE — 82272 OCCULT BLD FECES 1-3 TESTS: CPT | Performed by: INTERNAL MEDICINE

## 2021-01-12 NOTE — PROGRESS NOTES
"Chief Complaint  Metastatic lobular breast cancer (ER/[place] positive, HER-2/tj negative), anemia secondary to CKD3, CHF, peripheral neuropathy, chemotherapy related nausea chemotherapy-induced myelosuppression    Subjective        History of Present Illness  The patient returns today in follow-up with laboratory studies to review, continuing on Aromasin 25 mg daily and Ibrance 100 mg daily for 21/28 days.  The patient started a new cycle of Ibrance on 1/4/2020.  She did have a 2 unit PRBC transfusion last week with improvement in her hemoglobin to 9.4 on 1/8/2021.  She did complete stool cards per our request to mail them this weekend, they have not yet resulted.  She reports daily nausea following her Ibrance.  This does not always seem to improve with her antiemetics.  We discussed premedicating prior to her Ibrance with her ondansetron to see if this helps her situation.  She reports drinking approximately 40 ounces of water daily.  She has been trying to watch potassium rich foods.  She denies any current bowel issues, new pain, or increased shortness of breath.  She states that her energy level and shortness of breath actually improved following her transfusion.      Objective   Vital Signs:   /75   Pulse 84   Temp 98.2 °F (36.8 °C)   Ht 170.2 cm (67.01\")   Wt (!) 175 kg (386 lb 12.8 oz)   SpO2 93%   BMI 60.57 kg/m²     Physical Exam  Constitutional:       Appearance: She is well-developed. She is obese.   Eyes:      Conjunctiva/sclera: Conjunctivae normal.   Neck:      Thyroid: No thyromegaly.   Cardiovascular:      Rate and Rhythm: Normal rate and regular rhythm.      Heart sounds: Murmur present. No friction rub. No gallop.    Pulmonary:      Effort: No respiratory distress.      Breath sounds: Normal breath sounds.   Abdominal:      General: Bowel sounds are normal. There is no distension.      Palpations: Abdomen is soft.      Tenderness: There is no abdominal tenderness.   Musculoskeletal:       "   General: Swelling present.      Comments: Trace to 1+ bilateral lower extremity edema   Lymphadenopathy:      Head:      Right side of head: No submandibular adenopathy.      Cervical: No cervical adenopathy.      Upper Body:      Right upper body: No supraclavicular adenopathy.      Left upper body: No supraclavicular adenopathy.   Skin:     General: Skin is warm and dry.      Findings: No bruising or rash.   Neurological:      Mental Status: She is alert and oriented to person, place, and time.      Cranial Nerves: No cranial nerve deficit.      Motor: No abnormal muscle tone.      Deep Tendon Reflexes: Reflexes normal.   Psychiatric:         Behavior: Behavior normal.        Result Review : Reviewed CBC, CMP, B12, haptoglobin, LDH, iron panel, ferritin, CA 15-3 from today.        Assessment and Plan  1. Metastatic lobular breast cancer (ER/NE positive, HER-2/tj negative):  · History of stage IIIC right breast cancer ([id number])  · Patient treated previously in the [facility]  · Diagnosis via excisional biopsy 8/23/2003 with lobular carcinoma, 4.5 cm, positive margins.  ER/NE positive, HER-2/tj negative.  · Staging evaluation in September 2003 with negative bone scan and negative CT scans.  Ejection fraction 65%.  · Received neoadjuvant chemotherapy (? GET regimen) which apparently included Taxotere and possibly epirubicin.  Received 6 cycles.  · 1/22/2004 bilateral mastectomy with right axillary dissection.  Per available records, 10-12 cm residual invasive lobular carcinoma in the breast with 14/16 lymph nodes positive with extranodal extension.  Immediate reconstruction.  · Received adjuvant chemotherapy with carboplatin and navelbine x4 cycles  · Initiated adjuvant tamoxifen 6/22/2004  · Adjuvant radiation therapy initiated but interrupted due to chest wall cellulitis requiring removal of implants in August 2004  · Implant reconstruction again attempted with MRSA infection, implant removed 12/30/2005  with no further attempts made and reconstruction.  · Completed 5 years tamoxifen in June 2009 and subsequently transitioned to Femara.  Received Femara until June 2014.  · Patient experienced postmenopausal vaginal bleeding in 2013, negative endometrial biopsy and gynecologic evaluation.  · Patient last seen in [place] 6/18/2014  · CT chest performed to evaluate bicuspid aortic valve and dilated asending aorta 7/12/2019.  CT showed no change in aorta however showed new free intraperitoneal fluid in the upper abdomen with mesenteric edema, concerning for carcinomatosis.  · CT abdomen and pelvis (without IV contrast) on 7/16/2019 with mesenteric thickening, small volume of complex fluid in the mesentery which was suspicious and possibly representative of carcinomatosis, small amount of perihepatic fluid, small bowel loops tethered to omentum without obstruction, omental thickening, shotty retroperitoneal and pelvic lymph nodes (non-pathologically enlarged).  · PET scan 7/26/2019 with hypermetabolic omental activity, hypermetabolic small retroperitoneal lymph nodes, hypermetabolic activity throughout uterus with increase in size.  · CT-guided omental biopsy 7/30/2019 with metastatic lobular carcinoma of breast primary, ER positive (greater than 95%), CA positive (90%), HER-2/tj negative (1+ IHC)  · Baseline CA 15-3 on 7/22/19 was 32.6  · Initiation of Aromasin 25 mg daily 8/12/2019.  Initiated Ibrance at 100 mg 21/28 days on 8/26/2019.  · Improvement in CA 15-3 on 9/17/19-26.3  · Following 2 cycles of Aromasin and Ibrance, CA 15-3 declined to 25.9 on 10/15/2019.  CT scan chest abdomen pelvis 10/16/2019 showed improvement in the mesenteric and omental thickening and near resolution of complex ascites.  Treatment continued.  · Stable findings on subsequent CT chest abdomen pelvis 12/11/2019.  Further improvement in CA 15-3-23.9 on 12/18/2019.  Ibrance/Aromasin continued.   · Foundation medicine liquid biopsy  2/5/2020: MSI undetermined, mutations in BETH, NF1, CHEK2.  No evidence of PIK3CA mutation.  · Slight increase in CA 15-3 to 27.1 on 2/19/2020 and 29.1 on 3/18/2020.  CT however on 3/13/2020 with no new findings.  · Subsequent improvement in CA 15-3-26.5 on 5/15/2020  · CT 7/31/2020 showed evidence of stable disease.  CA 15-3 declined further to 23.1 on 7/24/2020.  · CT 10/19/2020 with stable disease.  Fluctuations in CA 15-3 of unclear significance.  · Seen 1/8/2021 continuing on Aromasin 25 mg daily, Ibrance 100 mg daily for 21/28 days (started current cycle Ibrance on 1/4/2021).    The patient continues to tolerate treatment very well.  She has however developed over the past few weeks increasing fatigue, dyspnea on exertion, feeling cold.  This appears to be related to her acute worsening of anemia with a hemoglobin of 7.1 today.  Unclear whether this is related to myelosuppression from Ibrance and we will discuss her anemia further below.  CA 15-3 at 23 which is decreased from previous value of 29.5 on 10/2/2020.  No clinical evidence of recurrent disease.    · Patient is seen on 1/12/2021 continuing on Ibrance, starting week 2 today.  She continues her Aromasin 25 mg daily.  She is having some nausea prior to the Ibrance this is discussed below.  She has follow-up CTs of the chest, abdomen, pelvis in 2 weeks and review by Dr. Irvin in 3 weeks.  2. Normocytic anemia in the setting of CKD3:  · The patient appears to have a chronic anemia with hemoglobin in the 10-11 range with normal MCV.  · Patient has underlying CKD3 with baseline creatinine recently in the 1.5-1.8 range.  · Anemia evaluation 7/22/2019 with iron 30, ferritin 85.2, iron saturation 10%, TIBC 311, B12 370, erythropoietin level 20.4  · Anemia felt in large part to be related to CKD3 as well as to underlying malignancy  · Evidence of folate deficiency 8/12/2019 with level 3.84, initiated folic acid 1 mg daily  · Additional labs on 12/18/2019 with  iron 73, ferritin 82.2, iron saturation 25%, TIBC 290.  We did discuss the potential use of Procrit if her hemoglobin declines into the low 9/upper 8 range.  · Decline in hemoglobin into the 8 range, iron studies 7/20/2020 with iron 54, ferritin 66.7, iron saturation 16%, TIBC 328.  On 8/7/2020 proceeded with Injectafer 750 mg IV x1 dose.  Second dose not administered due to onset of fever, subsequent iron studies precluded further administration of IV iron.  · Initiated Procrit 20,000 units every 4 weeks on 9/4/2020.  · Patient returns today continuing on Procrit every 4 weeks 20,000 units for hemoglobin less than 10.  The patient's last dose of Procrit was on 12/2/2020 at 20,000 units for hemoglobin of 9.0.  As noted above, hemoglobin today has declined significantly to 7.1.  This was repeated and verified at 7.2.  There has been no clinical evidence of GI blood loss.  Multiple additional labs performed today with iron 47, ferritin 430, iron saturation 20%, TIBC 235, folate greater than 20, B12 324, haptoglobin 525, .  Renal function is somewhat worse today with creatinine up to 3.14.  Unclear whether this is related to the worsening anemia.  The patient does have a low normal B12 level and we will start oral B12 1000 mcg daily although this is not the etiology of her significant decline in hemoglobin.  I did asked the patient to collect stool cards for occult blood x3.  We will plan transfusion 2 units PRBC tomorrow with 20 mg IV Lasix between units (patient has diagnosis of CHF).    · 1/6/2021 B12 324.  OTC 1000 mcg B12 initiated.  · Patient returns on 1/12/2021 with hemoglobin at 9 today.  3. CKD3:  · Baseline creatinine recently in 1.6-2.0 range  · On 9/10/2019, RYAN/CKD3 with creatinine up to 2.44, BUN of 40.  Received 1 L normal saline.  Patient with increase in oral fluid intake.  · Renal ultrasound 9/20/2019 with no evidence of obstructive uropathy.  · Diminished oral intake due to nausea from  Ibrance, creatinine 2.79 with BUN 43 on 10/15/2019.  Received 1 L normal saline.  Repeat labs on 10/18/2019 with BUN 31, creatinine 2.0.  · During cycle 3 Ibrance, patient developed increase in creatinine on 11/6/2019 to 2.35 and received 1 L normal saline.  · Patient is trying to maintain adequate oral hydration.    · Gradual escalation of creatinine into the 2-2 0.4 range and subsequently into the 2.5-2.8 range  · Creatinine today is above her previous baseline at 3.14 with BUN at 36.    · Creatinine remains above baseline at 3.13 today, 1/12/2021 and 3.04 last week.  4. CHF with mild to moderate aortic stenosis:  · Recent cardiology evaluation with echocardiogram 7/12/2019 showing ejection fraction 48% with moderate dilation of the LV cavity, global hypokinesis, grade 2 diastolic dysfunction, mild to moderate aortic stenosis, trivial pericardial effusion.  · CT chest 7/12/2019 with no change in the aorta compared to prior study 12/13/2017.  · No comment on current CT chest 10/19/2020 regarding aorta.  · With transfusion tomorrow, will plan to administer 20 mg IV Lasix between units  · The last office visit, the patient did follow-up with cardiology stating she is currently asymptomatic from her aortic valve stenosis.  Into the office today for initiation of her weekly Taxol.  She was seen just a few days status post cycle #4 of Adriamycin and Cytoxan, neutropenic at the time though she did receive Neulasta.  She was also having vertigo that improved with IV fluids.  She had reported discomfort on her right foot, particularly the ball of her foot.  We had referred her to podiatry at the time and covered her with Levaquin due to the neutropenia and visible redness on her foot.  Echocardiogram scheduled for July and follow-up with Dr. Harkins at that time.  5. Left upper and bilateral lower extremity peripheral neuropathy:  · Patient reports that left upper extremity neuropathy was not present from previous  chemotherapy but occurred after hospitalization that required intubation and critical care stay.  Apparently felt to represent critical care neuropathy.  Improved over time.  · Bilateral lower extremity neuropathy felt to be related to diabetes  · May have future implications regarding treatment for breast cancer.  6. Uterine/endometrial activity on PET scan:  · Patient experienced postmenopausal vaginal bleeding in 2013, negative endometrial biopsy and gynecologic evaluation.  · With findings on PET scan with enlarging uterus and some hypermetabolic activity, referred back to Dr. Oliveros and gynecology.    · 9/19/2019 D&C with hysteroscopy by Dr. Oliveros, no significant intraoperative findings, pathologic results with benign findings, no evidence of malignancy.  7. Nausea:  · Patient reports vomiting most daily following her Ibrance.  I have asked her to premedicate with Zofran.  8. Myelosuppression secondary to Ibrance:  · With cycle 1, jessica WBC 2.49 with ANC 1.25 and platelet count 140,000.  · With cycle 2, jessica WBC 2.33 with ANC 1.06, maintained normal platelet count  · Today, WBC  3.75 with ANC 2.8  9. Hyperkalemia  · Patient with borderline elevated potassium in the past  · Today, potassium remains elevated today at 5.3.  Patient will increase her fluid intake and     Plan:  1. Continue Aromasin 25 mg daily.    2. Continue Ibrance 100 mg daily for 21/28 days (current cycle Ibrance initiated on 1/4/2020)  3. Continue oral folic acid 1 mg daily  4. Continue 1000 g B12 daily.  5. She will return tomorrow for Procrit.  We will thereafter do weekly Procrit.  6. Awaiting stool cards for occult blood x3, patient mailed over the weekend.  7. In 1 week BMP CBC with Procrit  8. In 2 weeks CBC and Procrit  9. In 2.5 weeks CT chest abdomen pelvis as previously scheduled  10. In 3 weeks MD visit with CBC, CMP and Procrit as previously scheduled.     Patient continues on high risk medication requiring intensive  monitoring.    ADDENDUM: We will add a renal ultrasound due to the patient's further elevated creatinine, now over 3 as discussed with Dr. Irvin today.  Message left with Dr. Ledesma's nurse regarding further elevated creatinine.  Labs sent to their office today as well.

## 2021-01-13 ENCOUNTER — INFUSION (OUTPATIENT)
Dept: ONCOLOGY | Facility: HOSPITAL | Age: 61
End: 2021-01-13

## 2021-01-13 DIAGNOSIS — D50.8 OTHER IRON DEFICIENCY ANEMIA: Primary | ICD-10-CM

## 2021-01-13 DIAGNOSIS — D64.9 NORMOCYTIC ANEMIA: ICD-10-CM

## 2021-01-13 PROCEDURE — 96372 THER/PROPH/DIAG INJ SC/IM: CPT

## 2021-01-13 PROCEDURE — 25010000002 EPOETIN ALFA PER 1000 UNITS: Performed by: INTERNAL MEDICINE

## 2021-01-13 RX ADMIN — ERYTHROPOIETIN 25000 UNITS: 20000 INJECTION, SOLUTION INTRAVENOUS; SUBCUTANEOUS at 10:05

## 2021-01-14 ENCOUNTER — TELEPHONE (OUTPATIENT)
Dept: ONCOLOGY | Facility: CLINIC | Age: 61
End: 2021-01-14

## 2021-01-14 NOTE — TELEPHONE ENCOUNTER
Called Dr. Antonio's office. They took message to give to Pedro. Dr. Antonio's MA/RN and she is supposed to call back.   ----- Message from BETY Anderson sent at 1/14/2021  1:01 PM EST -----  Regarding: nephrology  Please call Dr. Antonio's office, nephrology, and let them know that the patient's creatinine has increased to greater than 3 since the first of the year.  Her baseline had been mid 2 range.  We have set her up for a renal ultrasound to be done early next week.  I had tried to reach Dr. Antonio earlier in the week and unfortunately when the call was returned I was in the patient room and they did not get me out.  I tried to call them back and had to leave a voicemail.  Thank so much for your help.

## 2021-01-14 NOTE — TELEPHONE ENCOUNTER
Spoke with Dr. Annabelle Antonio and informed her of elevated creat also that we ordered renal US. She V/U and stated she would let her  know and they would get patient in to be seen. Informed BETY Poe of this.

## 2021-01-14 NOTE — TELEPHONE ENCOUNTER
Called Pedro back. She sent message to Dr. Antonio about increase in creat and order for renal US. Also, left our number if Dr. Antonio would like to speak with BETY Poe or myself.

## 2021-01-18 ENCOUNTER — HOSPITAL ENCOUNTER (OUTPATIENT)
Dept: ULTRASOUND IMAGING | Facility: HOSPITAL | Age: 61
Discharge: HOME | End: 2021-01-18
Admitting: NURSE PRACTITIONER

## 2021-01-18 DIAGNOSIS — N18.30 ANEMIA IN STAGE 3 CHRONIC KIDNEY DISEASE, UNSPECIFIED WHETHER STAGE 3A OR 3B CKD (HCC): ICD-10-CM

## 2021-01-18 DIAGNOSIS — C50.919 METASTATIC BREAST CANCER: ICD-10-CM

## 2021-01-18 DIAGNOSIS — D63.1 ANEMIA IN STAGE 3 CHRONIC KIDNEY DISEASE, UNSPECIFIED WHETHER STAGE 3A OR 3B CKD (HCC): ICD-10-CM

## 2021-01-18 DIAGNOSIS — R79.89 ELEVATED SERUM CREATININE: ICD-10-CM

## 2021-01-18 PROCEDURE — 76775 US EXAM ABDO BACK WALL LIM: CPT

## 2021-01-20 ENCOUNTER — LAB (OUTPATIENT)
Dept: LAB | Facility: HOSPITAL | Age: 61
End: 2021-01-20

## 2021-01-20 ENCOUNTER — INFUSION (OUTPATIENT)
Dept: ONCOLOGY | Facility: HOSPITAL | Age: 61
End: 2021-01-20

## 2021-01-20 DIAGNOSIS — E87.5 HYPERKALEMIA: ICD-10-CM

## 2021-01-20 DIAGNOSIS — C50.919 METASTATIC BREAST CANCER: ICD-10-CM

## 2021-01-20 DIAGNOSIS — D64.9 NORMOCYTIC ANEMIA: ICD-10-CM

## 2021-01-20 DIAGNOSIS — D50.8 OTHER IRON DEFICIENCY ANEMIA: Primary | ICD-10-CM

## 2021-01-20 LAB
ANION GAP SERPL CALCULATED.3IONS-SCNC: 12.4 MMOL/L (ref 5–15)
BASOPHILS # BLD AUTO: 0.04 10*3/MM3 (ref 0–0.2)
BASOPHILS NFR BLD AUTO: 1.3 % (ref 0–1.5)
BUN SERPL-MCNC: 27 MG/DL (ref 6–20)
BUN/CREAT SERPL: 10 (ref 7.3–30)
CALCIUM SPEC-SCNC: 9 MG/DL (ref 8.5–10.2)
CHLORIDE SERPL-SCNC: 103 MMOL/L (ref 98–107)
CO2 SERPL-SCNC: 22.6 MMOL/L (ref 22–29)
CREAT SERPL-MCNC: 2.69 MG/DL (ref 0.6–1.1)
DEPRECATED RDW RBC AUTO: 64.1 FL (ref 37–54)
EOSINOPHIL # BLD AUTO: 0.06 10*3/MM3 (ref 0–0.4)
EOSINOPHIL NFR BLD AUTO: 1.9 % (ref 0.3–6.2)
ERYTHROCYTE [DISTWIDTH] IN BLOOD BY AUTOMATED COUNT: 18 % (ref 12.3–15.4)
GFR SERPL CREATININE-BSD FRML MDRD: 18 ML/MIN/1.73
GLUCOSE SERPL-MCNC: 153 MG/DL (ref 74–124)
HCT VFR BLD AUTO: 27 % (ref 34–46.6)
HGB BLD-MCNC: 8.7 G/DL (ref 12–15.9)
IMM GRANULOCYTES # BLD AUTO: 0.01 10*3/MM3 (ref 0–0.05)
IMM GRANULOCYTES NFR BLD AUTO: 0.3 % (ref 0–0.5)
LYMPHOCYTES # BLD AUTO: 0.74 10*3/MM3 (ref 0.7–3.1)
LYMPHOCYTES NFR BLD AUTO: 23.6 % (ref 19.6–45.3)
MCH RBC QN AUTO: 33.3 PG (ref 26.6–33)
MCHC RBC AUTO-ENTMCNC: 32.2 G/DL (ref 31.5–35.7)
MCV RBC AUTO: 103.4 FL (ref 79–97)
MONOCYTES # BLD AUTO: 0.18 10*3/MM3 (ref 0.1–0.9)
MONOCYTES NFR BLD AUTO: 5.8 % (ref 5–12)
NEUTROPHILS NFR BLD AUTO: 2.1 10*3/MM3 (ref 1.7–7)
NEUTROPHILS NFR BLD AUTO: 67.1 % (ref 42.7–76)
NRBC BLD AUTO-RTO: 0 /100 WBC (ref 0–0.2)
PLATELET # BLD AUTO: 224 10*3/MM3 (ref 140–450)
PMV BLD AUTO: 8.9 FL (ref 6–12)
POTASSIUM SERPL-SCNC: 4.5 MMOL/L (ref 3.5–4.7)
RBC # BLD AUTO: 2.61 10*6/MM3 (ref 3.77–5.28)
SODIUM SERPL-SCNC: 138 MMOL/L (ref 134–145)
WBC # BLD AUTO: 3.13 10*3/MM3 (ref 3.4–10.8)

## 2021-01-20 PROCEDURE — 96372 THER/PROPH/DIAG INJ SC/IM: CPT

## 2021-01-20 PROCEDURE — 85025 COMPLETE CBC W/AUTO DIFF WBC: CPT

## 2021-01-20 PROCEDURE — 36415 COLL VENOUS BLD VENIPUNCTURE: CPT

## 2021-01-20 PROCEDURE — 80048 BASIC METABOLIC PNL TOTAL CA: CPT

## 2021-01-20 PROCEDURE — 25010000002 EPOETIN ALFA PER 1000 UNITS: Performed by: INTERNAL MEDICINE

## 2021-01-20 RX ADMIN — ERYTHROPOIETIN 25000 UNITS: 20000 INJECTION, SOLUTION INTRAVENOUS; SUBCUTANEOUS at 13:54

## 2021-01-20 NOTE — PROGRESS NOTES
Pt is here for lab with RN review.  CBC reviewed with pt, counts are stable for this pt at this time. Pt has no complaints.  Copy of labs given to pt and f/u appt reviewed. Pt is instructed to call the office with any concerns or new symptoms prior to next visit. Pt vu    Lab Results   Component Value Date    WBC 3.13 (L) 01/20/2021    HGB 8.7 (L) 01/20/2021    HCT 27.0 (L) 01/20/2021    .4 (H) 01/20/2021     01/20/2021

## 2021-01-27 ENCOUNTER — LAB (OUTPATIENT)
Dept: LAB | Facility: HOSPITAL | Age: 61
End: 2021-01-27

## 2021-01-27 ENCOUNTER — INFUSION (OUTPATIENT)
Dept: ONCOLOGY | Facility: HOSPITAL | Age: 61
End: 2021-01-27

## 2021-01-27 DIAGNOSIS — D64.9 NORMOCYTIC ANEMIA: ICD-10-CM

## 2021-01-27 DIAGNOSIS — D50.8 OTHER IRON DEFICIENCY ANEMIA: Primary | ICD-10-CM

## 2021-01-27 DIAGNOSIS — D50.8 OTHER IRON DEFICIENCY ANEMIA: ICD-10-CM

## 2021-01-27 LAB
BASOPHILS # BLD AUTO: 0.04 10*3/MM3 (ref 0–0.2)
BASOPHILS NFR BLD AUTO: 1.7 % (ref 0–1.5)
DEPRECATED RDW RBC AUTO: 66.1 FL (ref 37–54)
EOSINOPHIL # BLD AUTO: 0.05 10*3/MM3 (ref 0–0.4)
EOSINOPHIL NFR BLD AUTO: 2.2 % (ref 0.3–6.2)
ERYTHROCYTE [DISTWIDTH] IN BLOOD BY AUTOMATED COUNT: 18.5 % (ref 12.3–15.4)
HCT VFR BLD AUTO: 26.8 % (ref 34–46.6)
HGB BLD-MCNC: 9 G/DL (ref 12–15.9)
IMM GRANULOCYTES # BLD AUTO: 0.01 10*3/MM3 (ref 0–0.05)
IMM GRANULOCYTES NFR BLD AUTO: 0.4 % (ref 0–0.5)
LYMPHOCYTES # BLD AUTO: 0.64 10*3/MM3 (ref 0.7–3.1)
LYMPHOCYTES NFR BLD AUTO: 27.6 % (ref 19.6–45.3)
MCH RBC QN AUTO: 33.2 PG (ref 26.6–33)
MCHC RBC AUTO-ENTMCNC: 33.6 G/DL (ref 31.5–35.7)
MCV RBC AUTO: 98.9 FL (ref 79–97)
MONOCYTES # BLD AUTO: 0.28 10*3/MM3 (ref 0.1–0.9)
MONOCYTES NFR BLD AUTO: 12.1 % (ref 5–12)
NEUTROPHILS NFR BLD AUTO: 1.3 10*3/MM3 (ref 1.7–7)
NEUTROPHILS NFR BLD AUTO: 56 % (ref 42.7–76)
NRBC BLD AUTO-RTO: 0 /100 WBC (ref 0–0.2)
PLATELET # BLD AUTO: 169 10*3/MM3 (ref 140–450)
PMV BLD AUTO: 9.9 FL (ref 6–12)
RBC # BLD AUTO: 2.71 10*6/MM3 (ref 3.77–5.28)
WBC # BLD AUTO: 2.32 10*3/MM3 (ref 3.4–10.8)

## 2021-01-27 PROCEDURE — 96372 THER/PROPH/DIAG INJ SC/IM: CPT

## 2021-01-27 PROCEDURE — 85025 COMPLETE CBC W/AUTO DIFF WBC: CPT

## 2021-01-27 PROCEDURE — 36415 COLL VENOUS BLD VENIPUNCTURE: CPT

## 2021-01-27 PROCEDURE — 25010000002 EPOETIN ALFA PER 1000 UNITS: Performed by: INTERNAL MEDICINE

## 2021-01-27 RX ADMIN — ERYTHROPOIETIN 25000 UNITS: 20000 INJECTION, SOLUTION INTRAVENOUS; SUBCUTANEOUS at 10:21

## 2021-01-29 ENCOUNTER — HOSPITAL ENCOUNTER (OUTPATIENT)
Dept: PET IMAGING | Facility: HOSPITAL | Age: 61
Discharge: HOME | End: 2021-01-29
Admitting: INTERNAL MEDICINE

## 2021-01-29 DIAGNOSIS — C50.919 METASTATIC BREAST CANCER: ICD-10-CM

## 2021-01-29 PROCEDURE — 74176 CT ABD & PELVIS W/O CONTRAST: CPT

## 2021-01-29 PROCEDURE — 71250 CT THORAX DX C-: CPT

## 2021-01-29 PROCEDURE — 0 DIATRIZOATE MEGLUMINE & SODIUM PER 1 ML: Performed by: INTERNAL MEDICINE

## 2021-01-29 RX ADMIN — DIATRIZOATE MEGLUMINE AND DIATRIZOATE SODIUM 30 ML: 660; 100 LIQUID ORAL; RECTAL at 07:42

## 2021-02-02 NOTE — PROGRESS NOTES
"Chief Complaint  Metastatic lobular breast cancer (ER/WV positive, HER-2/tj negative), anemia secondary to CKD3, CHF, peripheral neuropathy, chemotherapy related nausea chemotherapy-induced myelosuppression    Subjective        History of Present Illness  The patient returns today in follow-up with laboratory studies to review, continuing on Aromasin 25 mg daily and Ibrance 100 mg daily for 21/28 days as well as weekly Procrit.  The patient started a new cycle of Ibrance on 2/1/2021.  The patient has had recent exacerbation of her anemia with hemoglobin that had declined down to 7.1 on 1/6/2021 and required transfusion 2 unit PRBC.  Patient has been continuing on Procrit every week since then with dose escalation up to 25,000 units.  We have been maintaining hemoglobin in the 8-9 range since then.  Patient notes significant improvement in fatigue after transfusion.  Her renal function had worsened somewhat as well with creatinine that increased into the low 3 range.  Patient was encouraged to increase her oral intake and since she has done so, creatinine has returned back into the mid 2 range.  She is due for follow-up with nephrology soon.  Patient has no new complaints today.  She has some mild chronic nausea related to Ibrance and has been using Zofran as premedication with improved tolerance.  She denies any clinical evidence of GI blood loss and stool is negative for occult blood x3 on 1/12/2021.  She does have some mild chronic fatigue, mild chronic dyspnea on exertion.  She does have CT scans to review today.      Objective   Vital Signs:   /76   Pulse 77   Temp 97.3 °F (36.3 °C) (Temporal)   Resp 18   Ht 170.2 cm (67.01\")   Wt (!) 176 kg (387 lb 3.2 oz)   SpO2 96%   BMI 60.63 kg/m²     Physical Exam  Constitutional:       Appearance: She is well-developed. She is obese.   Eyes:      Conjunctiva/sclera: Conjunctivae normal.   Neck:      Thyroid: No thyromegaly.   Cardiovascular:      Rate and " Rhythm: Normal rate and regular rhythm.      Heart sounds: Murmur present. No friction rub. No gallop.    Pulmonary:      Effort: No respiratory distress.      Breath sounds: Normal breath sounds.   Abdominal:      General: Bowel sounds are normal. There is no distension.      Palpations: Abdomen is soft.      Tenderness: There is no abdominal tenderness.   Musculoskeletal:         General: Swelling present.      Comments: Trace to 1+ bilateral lower extremity edema   Lymphadenopathy:      Head:      Right side of head: No submandibular adenopathy.      Cervical: No cervical adenopathy.      Upper Body:      Right upper body: No supraclavicular adenopathy.      Left upper body: No supraclavicular adenopathy.   Skin:     General: Skin is warm and dry.      Findings: No bruising or rash.   Neurological:      Mental Status: She is alert and oriented to person, place, and time.      Cranial Nerves: No cranial nerve deficit.      Motor: No abnormal muscle tone.      Deep Tendon Reflexes: Reflexes normal.   Psychiatric:         Behavior: Behavior normal.     Patient was examined today, unchanged from above      Result Review : Reviewed CBC, CMP, iron panel, ferritin from today.  Reviewed stool for occult blood x3.  Reviewed CT chest abdomen pelvis 1/29/2021.  I did personally review CT scans with interpretation as outlined below.       Assessment and Plan  1. Metastatic lobular breast cancer (ER/PA positive, HER-2/tj negative):  · History of stage IIIC right breast cancer ([id number])  · Patient treated previously in the [facility]  · Diagnosis via excisional biopsy 8/23/2003 with lobular carcinoma, 4.5 cm, positive margins.  ER/PA positive, HER-2/tj negative.  · Staging evaluation in September 2003 with negative bone scan and negative CT scans.  Ejection fraction 65%.  · Received neoadjuvant chemotherapy (? GET regimen) which apparently included Taxotere and possibly epirubicin.  Received 6 cycles.  · 1/22/2004  bilateral mastectomy with right axillary dissection.  Per available records, 10-12 cm residual invasive lobular carcinoma in the breast with 14/16 lymph nodes positive with extranodal extension.  Immediate reconstruction.  · Received adjuvant chemotherapy with carboplatin and navelbine x4 cycles  · Initiated adjuvant tamoxifen 6/22/2004  · Adjuvant radiation therapy initiated but interrupted due to chest wall cellulitis requiring removal of implants in August 2004  · Implant reconstruction again attempted with MRSA infection, implant removed 12/30/2005 with no further attempts made and reconstruction.  · Completed 5 years tamoxifen in June 2009 and subsequently transitioned to Femara.  Received Femara until June 2014.  · Patient experienced postmenopausal vaginal bleeding in 2013, negative endometrial biopsy and gynecologic evaluation.  · Patient last seen in [facility] 6/18/2014  · CT chest performed to evaluate bicuspid aortic valve and dilated asending aorta 7/12/2019.  CT showed no change in aorta however showed new free intraperitoneal fluid in the upper abdomen with mesenteric edema, concerning for carcinomatosis.  · CT abdomen and pelvis (without IV contrast) on 7/16/2019 with mesenteric thickening, small volume of complex fluid in the mesentery which was suspicious and possibly representative of carcinomatosis, small amount of perihepatic fluid, small bowel loops tethered to omentum without obstruction, omental thickening, shotty retroperitoneal and pelvic lymph nodes (non-pathologically enlarged).  · PET scan 7/26/2019 with hypermetabolic omental activity, hypermetabolic small retroperitoneal lymph nodes, hypermetabolic activity throughout uterus with increase in size.  · CT-guided omental biopsy 7/30/2019 with metastatic lobular carcinoma of breast primary, ER positive (greater than 95%), RI positive (90%), HER-2/tj negative (1+ IHC)  · Baseline CA 15-3 on 7/22/19 was 32.6  · Initiation of Aromasin 25  mg daily 8/12/2019.  Initiated Ibrance at 100 mg 21/28 days on 8/26/2019.  · Improvement in CA 15-3 on 9/17/19-26.3  · Following 2 cycles of Aromasin and Ibrance, CA 15-3 declined to 25.9 on 10/15/2019.  CT scan chest abdomen pelvis 10/16/2019 showed improvement in the mesenteric and omental thickening and near resolution of complex ascites.  Treatment continued.  · Stable findings on subsequent CT chest abdomen pelvis 12/11/2019.  Further improvement in CA 15-3-23.9 on 12/18/2019.  Ibrance/Aromasin continued.   · [facility] medicine liquid biopsy 2/5/2020: MSI undetermined, mutations in BETH, NF1, CHEK2.  No evidence of PIK3CA mutation.  · Slight increase in CA 15-3 to 27.1 on 2/19/2020 and 29.1 on 3/18/2020.  CT however on 3/13/2020 with no new findings.  · Subsequent improvement in CA 15-3-26.5 on 5/15/2020  · CT 7/31/2020 showed evidence of stable disease.  CA 15-3 declined further to 23.1 on 7/24/2020.  · CT 10/19/2020 with stable disease.  Fluctuations in CA 15-3 of unclear significance.  · CA 15-3 on 1/6/2021 decreased to 23.  CT chest abdomen pelvis 1/29/2021 with stable findings.  · Patient returns today in follow-up continuing on Aromasin 25 mg daily, Ibrance 100 mg daily for 21/28 days (started current cycle Ibrance on 2/1/2021).  Patient is tolerating treatment reasonably well.  She has some mild nausea relieved with Zofran taken prior to Ibrance daily.  She has some mild chronic fatigue.  We did review her CT scans from 1/29/2021 which fortunately show stable findings.  We will therefore continue on with treatment as planned.  I will plan to see her back in 1 month and we will consider repeating scans at the end of 3 months.  2. Normocytic anemia in the setting of CKD3:  · The patient appears to have a chronic anemia with hemoglobin in the 10-11 range with normal MCV.  · Patient has underlying CKD3 with baseline creatinine recently in the 1.5-1.8 range.  · Anemia evaluation 7/22/2019 with iron 30,  ferritin 85.2, iron saturation 10%, TIBC 311, B12 370, erythropoietin level 20.4  · Anemia felt in large part to be related to CKD3 as well as to underlying malignancy  · Evidence of folate deficiency 8/12/2019 with level 3.84, initiated folic acid 1 mg daily  · Additional labs on 12/18/2019 with iron 73, ferritin 82.2, iron saturation 25%, TIBC 290.  We did discuss the potential use of Procrit if her hemoglobin declines into the low 9/upper 8 range.  · Decline in hemoglobin into the 8 range, iron studies 7/20/2020 with iron 54, ferritin 66.7, iron saturation 16%, TIBC 328.  On 8/7/2020 proceeded with Injectafer 750 mg IV x1 dose.  Second dose not administered due to onset of fever, subsequent iron studies precluded further administration of IV iron.  · Initiated Procrit 20,000 units every 4 weeks on 9/4/2020.  · On 1/6/2021, hemoglobin declined significantly to 7.1.  This was repeated and verified at 7.2.  No evidence of GI blood loss, stool cards negative for occult blood x3 on 1/12/2021.  Additional labs on 1/6/2021 with iron 47, ferritin 430, iron saturation 20%, TIBC 235, folate greater than 20, B12 324, haptoglobin 525, .  · Transfused 2 unit PRBC on 1/7/2021  · Change in Procrit dosing to weekly  · Due to low normal B12 level initiated oral B12 1000 mcg daily.  · Hemoglobin today is 8.4, patient received Procrit 25,000 units and we will continue dosing her on a weekly basis for now.  I will see her back in 4 weeks for follow-up.  Repeat iron studies today are adequate.  3. CKD3:  · Baseline creatinine recently in 1.6-2.0 range  · On 9/10/2019, RYAN/CKD3 with creatinine up to 2.44, BUN of 40.  Received 1 L normal saline.  Patient with increase in oral fluid intake.  · Renal ultrasound 9/20/2019 with no evidence of obstructive uropathy.  · Diminished oral intake due to nausea from Ibrance, creatinine 2.79 with BUN 43 on 10/15/2019.  Received 1 L normal saline.  Repeat labs on 10/18/2019 with BUN 31,  creatinine 2.0.  · During cycle 3 Ibrance, patient developed increase in creatinine on 11/6/2019 to 2.35 and received 1 L normal saline.  · Patient is trying to maintain adequate oral hydration.    · Gradual escalation of creatinine into the 2-2.4 range and subsequently into the 2.5-2.8 range  · With worsening anemia, creatinine increased into the low 3 range in early January 2021.  Patient increased oral hydration with improvement back into the mid 2 range.  · Today, creatinine is 2.4.  Patient notes continuing with aggressive oral hydration.  She is due for follow-up with nephrology soon.  4. CHF with mild to moderate aortic stenosis:  · Recent cardiology evaluation with echocardiogram 7/12/2019 showing ejection fraction 48% with moderate dilation of the LV cavity, global hypokinesis, grade 2 diastolic dysfunction, mild to moderate aortic stenosis, trivial pericardial effusion.  · CT chest 7/12/2019 with no change in the aorta compared to prior study 12/13/2017.  5. Left upper and bilateral lower extremity peripheral neuropathy:  · Patient reports that left upper extremity neuropathy was not present from previous chemotherapy but occurred after hospitalization that required intubation and critical care stay.  Apparently felt to represent critical care neuropathy.  Improved over time.  · Bilateral lower extremity neuropathy felt to be related to diabetes  · May have future implications regarding treatment for breast cancer.  6. Uterine/endometrial activity on PET scan:  · Patient experienced postmenopausal vaginal bleeding in 2013, negative endometrial biopsy and gynecologic evaluation.  · With findings on PET scan with enlarging uterus and some hypermetabolic activity, referred back to Dr. Oliveros and gynecology.    · 9/19/2019 D&C with hysteroscopy by Dr. Oliveros, no significant intraoperative findings, pathologic results with benign findings, no evidence of malignancy.  7. Nausea:  · Mild, relieved with Zofran.     8. Myelosuppression secondary to Ibrance:  · With cycle 1, jessica WBC 2.49 with ANC 1.25 and platelet count 140,000.  · With cycle 2, jessica WBC 2.33 with ANC 1.06, maintained normal platelet count  · Today, WBC  4.21 with ANC 2.56  9. Hyperkalemia  · Patient with borderline elevated potassium in the past  · Today, potassium 5.0.       Plan:  1. Continue Aromasin 25 mg daily.    2. Continue Ibrance 100 mg daily for 21/28 days (current cycle Ibrance initiated on 2/1/2021)  3. Continue oral folic acid 1 mg daily  4. Continue oral B12 1000 mcg daily  5. Transfuse 2 unit PRBC tomorrow with 20 mg IV Lasix between units  6. Procrit 25,000 units today  7. Return for weekly CBC and Procrit  8. In 4 weeks MD visit with CBC, CMP.  We anticipate repeat CT scans at 3-month interval from current study.     Patient continues on high risk medication requiring intensive monitoring.

## 2021-02-03 ENCOUNTER — LAB (OUTPATIENT)
Dept: LAB | Facility: HOSPITAL | Age: 61
End: 2021-02-03

## 2021-02-03 ENCOUNTER — INFUSION (OUTPATIENT)
Dept: ONCOLOGY | Facility: HOSPITAL | Age: 61
End: 2021-02-03

## 2021-02-03 ENCOUNTER — TELEPHONE (OUTPATIENT)
Dept: ONCOLOGY | Facility: CLINIC | Age: 61
End: 2021-02-03

## 2021-02-03 ENCOUNTER — OFFICE VISIT (OUTPATIENT)
Dept: ONCOLOGY | Facility: CLINIC | Age: 61
End: 2021-02-03

## 2021-02-03 VITALS
OXYGEN SATURATION: 96 % | HEART RATE: 77 BPM | TEMPERATURE: 97.3 F | BODY MASS INDEX: 45.99 KG/M2 | WEIGHT: 293 LBS | RESPIRATION RATE: 18 BRPM | DIASTOLIC BLOOD PRESSURE: 76 MMHG | HEIGHT: 67 IN | SYSTOLIC BLOOD PRESSURE: 173 MMHG

## 2021-02-03 DIAGNOSIS — D50.8 OTHER IRON DEFICIENCY ANEMIA: Primary | ICD-10-CM

## 2021-02-03 DIAGNOSIS — C50.919 METASTATIC BREAST CANCER: ICD-10-CM

## 2021-02-03 DIAGNOSIS — D50.8 OTHER IRON DEFICIENCY ANEMIA: ICD-10-CM

## 2021-02-03 DIAGNOSIS — D64.9 NORMOCYTIC ANEMIA: ICD-10-CM

## 2021-02-03 DIAGNOSIS — C50.919 METASTATIC BREAST CANCER: Primary | ICD-10-CM

## 2021-02-03 LAB
ALBUMIN SERPL-MCNC: 3.4 G/DL (ref 3.5–5.2)
ALBUMIN/GLOB SERPL: 0.9 G/DL (ref 1.1–2.4)
ALP SERPL-CCNC: 87 U/L (ref 38–116)
ALT SERPL W P-5'-P-CCNC: 8 U/L (ref 0–33)
ANION GAP SERPL CALCULATED.3IONS-SCNC: 13.1 MMOL/L (ref 5–15)
AST SERPL-CCNC: 13 U/L (ref 0–32)
BASOPHILS # BLD AUTO: 0.08 10*3/MM3 (ref 0–0.2)
BASOPHILS NFR BLD AUTO: 1.9 % (ref 0–1.5)
BILIRUB SERPL-MCNC: 0.6 MG/DL (ref 0.2–1.2)
BUN SERPL-MCNC: 25 MG/DL (ref 6–20)
BUN/CREAT SERPL: 10.4 (ref 7.3–30)
CALCIUM SPEC-SCNC: 9 MG/DL (ref 8.5–10.2)
CHLORIDE SERPL-SCNC: 103 MMOL/L (ref 98–107)
CO2 SERPL-SCNC: 20.9 MMOL/L (ref 22–29)
CREAT SERPL-MCNC: 2.4 MG/DL (ref 0.6–1.1)
DEPRECATED RDW RBC AUTO: 69.2 FL (ref 37–54)
EOSINOPHIL # BLD AUTO: 0.06 10*3/MM3 (ref 0–0.4)
EOSINOPHIL NFR BLD AUTO: 1.4 % (ref 0.3–6.2)
ERYTHROCYTE [DISTWIDTH] IN BLOOD BY AUTOMATED COUNT: 18.5 % (ref 12.3–15.4)
FERRITIN SERPL-MCNC: 386.8 NG/ML (ref 13–150)
GFR SERPL CREATININE-BSD FRML MDRD: 20 ML/MIN/1.73
GLOBULIN UR ELPH-MCNC: 4 GM/DL (ref 1.8–3.5)
GLUCOSE SERPL-MCNC: 152 MG/DL (ref 74–124)
HCT VFR BLD AUTO: 26.2 % (ref 34–46.6)
HGB BLD-MCNC: 8.4 G/DL (ref 12–15.9)
IMM GRANULOCYTES # BLD AUTO: 0.07 10*3/MM3 (ref 0–0.05)
IMM GRANULOCYTES NFR BLD AUTO: 1.7 % (ref 0–0.5)
IRON 24H UR-MRATE: 61 MCG/DL (ref 37–145)
IRON SATN MFR SERPL: 24 % (ref 14–48)
LYMPHOCYTES # BLD AUTO: 0.9 10*3/MM3 (ref 0.7–3.1)
LYMPHOCYTES NFR BLD AUTO: 21.4 % (ref 19.6–45.3)
MCH RBC QN AUTO: 32.9 PG (ref 26.6–33)
MCHC RBC AUTO-ENTMCNC: 32.1 G/DL (ref 31.5–35.7)
MCV RBC AUTO: 102.7 FL (ref 79–97)
MONOCYTES # BLD AUTO: 0.54 10*3/MM3 (ref 0.1–0.9)
MONOCYTES NFR BLD AUTO: 12.8 % (ref 5–12)
NEUTROPHILS NFR BLD AUTO: 2.56 10*3/MM3 (ref 1.7–7)
NEUTROPHILS NFR BLD AUTO: 60.8 % (ref 42.7–76)
NRBC BLD AUTO-RTO: 0 /100 WBC (ref 0–0.2)
PLATELET # BLD AUTO: 216 10*3/MM3 (ref 140–450)
PMV BLD AUTO: 9.1 FL (ref 6–12)
POTASSIUM SERPL-SCNC: 5 MMOL/L (ref 3.5–4.7)
PROT SERPL-MCNC: 7.4 G/DL (ref 6.3–8)
RBC # BLD AUTO: 2.55 10*6/MM3 (ref 3.77–5.28)
SODIUM SERPL-SCNC: 137 MMOL/L (ref 134–145)
TIBC SERPL-MCNC: 256 MCG/DL (ref 249–505)
TRANSFERRIN SERPL-MCNC: 183 MG/DL (ref 200–360)
WBC # BLD AUTO: 4.21 10*3/MM3 (ref 3.4–10.8)

## 2021-02-03 PROCEDURE — 83540 ASSAY OF IRON: CPT

## 2021-02-03 PROCEDURE — 84466 ASSAY OF TRANSFERRIN: CPT

## 2021-02-03 PROCEDURE — 25010000002 EPOETIN ALFA PER 1000 UNITS: Performed by: INTERNAL MEDICINE

## 2021-02-03 PROCEDURE — 80053 COMPREHEN METABOLIC PANEL: CPT

## 2021-02-03 PROCEDURE — 99215 OFFICE O/P EST HI 40 MIN: CPT | Performed by: INTERNAL MEDICINE

## 2021-02-03 PROCEDURE — 82728 ASSAY OF FERRITIN: CPT

## 2021-02-03 PROCEDURE — 85025 COMPLETE CBC W/AUTO DIFF WBC: CPT

## 2021-02-03 PROCEDURE — 96372 THER/PROPH/DIAG INJ SC/IM: CPT

## 2021-02-03 PROCEDURE — 36415 COLL VENOUS BLD VENIPUNCTURE: CPT

## 2021-02-03 RX ADMIN — ERYTHROPOIETIN 25000 UNITS: 20000 INJECTION, SOLUTION INTRAVENOUS; SUBCUTANEOUS at 15:07

## 2021-02-03 NOTE — TELEPHONE ENCOUNTER
Caller: PT    Relationship to patient: PT    Best call back number: [phone]    PT TO RESCHED 2/17 APPTS FOR 2/18 LATE MORNING/ EARLY AFTERNOON IF POSSIBLE.    PLEASE RETURN CALL    THANK YOU

## 2021-02-05 ENCOUNTER — MEDICATION THERAPY MANAGEMENT (OUTPATIENT)
Dept: PHARMACY | Facility: HOSPITAL | Age: 61
End: 2021-02-05

## 2021-02-05 NOTE — PROGRESS NOTES
[facility] Lab Review- Ibrance      2/3/2021   WBC 3.40 - 10.80 10*3/mm3 4.21   Neutrophils Absolute 1.70 - 7.00 10*3/mm3 2.56   Hemoglobin 12.0 - 15.9 g/dL 8.4 (A)   Hematocrit 34.0 - 46.6 % 26.2 (A)   Platelets 140 - 450 10*3/mm3 216   Creatinine 0.60 - 1.10 mg/dL 2.40 (A)   eGFR Non African Am >60 mL/min/1.73 20 (A)   BUN 6 - 20 mg/dL 25 (A)   Sodium 134 - 145 mmol/L 137   Potassium 3.5 - 4.7 mmol/L 5.0 (A)   Glucose 74 - 124 mg/dL 152 (A)   Calcium 8.5 - 10.2 mg/dL 9.0   Albumin 3.50 - 5.20 g/dL 3.40 (A)   Total Protein 6.3 - 8.0 g/dL 7.4   AST (SGOT) 0 - 32 U/L 13   ALT (SGPT) 0 - 33 U/L 8   Alkaline Phosphatase 38 - 116 U/L 87   Total Bilirubin 0.2 - 1.2 mg/dL 0.6   Iron 37 - 145 mcg/dL 61   TIBC 249 - 505 mcg/dL 256   Ferritin 13.00 - 150.00 ng/mL 386.80 (A)   Iron Saturation 14 - 48 % 24     MD dictation noted. Pharmacy will continue to follow.     Thanks,   Myah Argueta, PharmD

## 2021-02-10 ENCOUNTER — INFUSION (OUTPATIENT)
Dept: ONCOLOGY | Facility: HOSPITAL | Age: 61
End: 2021-02-10

## 2021-02-10 ENCOUNTER — LAB (OUTPATIENT)
Dept: LAB | Facility: HOSPITAL | Age: 61
End: 2021-02-10

## 2021-02-10 DIAGNOSIS — D50.8 OTHER IRON DEFICIENCY ANEMIA: Primary | ICD-10-CM

## 2021-02-10 DIAGNOSIS — D64.9 NORMOCYTIC ANEMIA: ICD-10-CM

## 2021-02-10 DIAGNOSIS — C50.919 METASTATIC BREAST CANCER: ICD-10-CM

## 2021-02-10 LAB
BASOPHILS # BLD AUTO: 0.05 10*3/MM3 (ref 0–0.2)
BASOPHILS NFR BLD AUTO: 1.2 % (ref 0–1.5)
DEPRECATED RDW RBC AUTO: 65 FL (ref 37–54)
EOSINOPHIL # BLD AUTO: 0.06 10*3/MM3 (ref 0–0.4)
EOSINOPHIL NFR BLD AUTO: 1.4 % (ref 0.3–6.2)
ERYTHROCYTE [DISTWIDTH] IN BLOOD BY AUTOMATED COUNT: 17.6 % (ref 12.3–15.4)
HCT VFR BLD AUTO: 26.2 % (ref 34–46.6)
HGB BLD-MCNC: 8.8 G/DL (ref 12–15.9)
IMM GRANULOCYTES # BLD AUTO: 0.03 10*3/MM3 (ref 0–0.05)
IMM GRANULOCYTES NFR BLD AUTO: 0.7 % (ref 0–0.5)
LYMPHOCYTES # BLD AUTO: 0.78 10*3/MM3 (ref 0.7–3.1)
LYMPHOCYTES NFR BLD AUTO: 18.8 % (ref 19.6–45.3)
MCH RBC QN AUTO: 33.8 PG (ref 26.6–33)
MCHC RBC AUTO-ENTMCNC: 33.6 G/DL (ref 31.5–35.7)
MCV RBC AUTO: 100.8 FL (ref 79–97)
MONOCYTES # BLD AUTO: 0.32 10*3/MM3 (ref 0.1–0.9)
MONOCYTES NFR BLD AUTO: 7.7 % (ref 5–12)
NEUTROPHILS NFR BLD AUTO: 2.9 10*3/MM3 (ref 1.7–7)
NEUTROPHILS NFR BLD AUTO: 70.2 % (ref 42.7–76)
NRBC BLD AUTO-RTO: 0.5 /100 WBC (ref 0–0.2)
PLATELET # BLD AUTO: 261 10*3/MM3 (ref 140–450)
PMV BLD AUTO: 9.7 FL (ref 6–12)
RBC # BLD AUTO: 2.6 10*6/MM3 (ref 3.77–5.28)
WBC # BLD AUTO: 4.14 10*3/MM3 (ref 3.4–10.8)

## 2021-02-10 PROCEDURE — 96372 THER/PROPH/DIAG INJ SC/IM: CPT

## 2021-02-10 PROCEDURE — 25010000002 EPOETIN ALFA PER 1000 UNITS: Performed by: INTERNAL MEDICINE

## 2021-02-10 PROCEDURE — 85025 COMPLETE CBC W/AUTO DIFF WBC: CPT

## 2021-02-10 PROCEDURE — 36415 COLL VENOUS BLD VENIPUNCTURE: CPT

## 2021-02-10 RX ADMIN — ERYTHROPOIETIN 31000 UNITS: 20000 INJECTION, SOLUTION INTRAVENOUS; SUBCUTANEOUS at 10:47

## 2021-02-17 ENCOUNTER — APPOINTMENT (OUTPATIENT)
Dept: LAB | Facility: HOSPITAL | Age: 61
End: 2021-02-17

## 2021-02-17 ENCOUNTER — APPOINTMENT (OUTPATIENT)
Dept: ONCOLOGY | Facility: HOSPITAL | Age: 61
End: 2021-02-17

## 2021-02-18 ENCOUNTER — LAB (OUTPATIENT)
Dept: LAB | Facility: HOSPITAL | Age: 61
End: 2021-02-18

## 2021-02-18 ENCOUNTER — INFUSION (OUTPATIENT)
Dept: ONCOLOGY | Facility: HOSPITAL | Age: 61
End: 2021-02-18

## 2021-02-18 DIAGNOSIS — D64.9 NORMOCYTIC ANEMIA: ICD-10-CM

## 2021-02-18 DIAGNOSIS — D50.8 OTHER IRON DEFICIENCY ANEMIA: Primary | ICD-10-CM

## 2021-02-18 DIAGNOSIS — D50.8 OTHER IRON DEFICIENCY ANEMIA: ICD-10-CM

## 2021-02-18 LAB
BASOPHILS # BLD AUTO: 0.06 10*3/MM3 (ref 0–0.2)
BASOPHILS NFR BLD AUTO: 1.3 % (ref 0–1.5)
DEPRECATED RDW RBC AUTO: 63.6 FL (ref 37–54)
EOSINOPHIL # BLD AUTO: 0.08 10*3/MM3 (ref 0–0.4)
EOSINOPHIL NFR BLD AUTO: 1.7 % (ref 0.3–6.2)
ERYTHROCYTE [DISTWIDTH] IN BLOOD BY AUTOMATED COUNT: 17.3 % (ref 12.3–15.4)
HCT VFR BLD AUTO: 24.8 % (ref 34–46.6)
HGB BLD-MCNC: 8.1 G/DL (ref 12–15.9)
IMM GRANULOCYTES # BLD AUTO: 0.04 10*3/MM3 (ref 0–0.05)
IMM GRANULOCYTES NFR BLD AUTO: 0.9 % (ref 0–0.5)
LYMPHOCYTES # BLD AUTO: 0.67 10*3/MM3 (ref 0.7–3.1)
LYMPHOCYTES NFR BLD AUTO: 14.6 % (ref 19.6–45.3)
MCH RBC QN AUTO: 33.2 PG (ref 26.6–33)
MCHC RBC AUTO-ENTMCNC: 32.7 G/DL (ref 31.5–35.7)
MCV RBC AUTO: 101.6 FL (ref 79–97)
MONOCYTES # BLD AUTO: 0.32 10*3/MM3 (ref 0.1–0.9)
MONOCYTES NFR BLD AUTO: 7 % (ref 5–12)
NEUTROPHILS NFR BLD AUTO: 3.43 10*3/MM3 (ref 1.7–7)
NEUTROPHILS NFR BLD AUTO: 74.5 % (ref 42.7–76)
NRBC BLD AUTO-RTO: 0 /100 WBC (ref 0–0.2)
PLATELET # BLD AUTO: 254 10*3/MM3 (ref 140–450)
PMV BLD AUTO: 9.9 FL (ref 6–12)
RBC # BLD AUTO: 2.44 10*6/MM3 (ref 3.77–5.28)
WBC # BLD AUTO: 4.6 10*3/MM3 (ref 3.4–10.8)

## 2021-02-18 PROCEDURE — 25010000002 EPOETIN ALFA PER 1000 UNITS: Performed by: INTERNAL MEDICINE

## 2021-02-18 PROCEDURE — 85025 COMPLETE CBC W/AUTO DIFF WBC: CPT

## 2021-02-18 PROCEDURE — 36415 COLL VENOUS BLD VENIPUNCTURE: CPT

## 2021-02-18 PROCEDURE — 96372 THER/PROPH/DIAG INJ SC/IM: CPT

## 2021-02-18 RX ADMIN — ERYTHROPOIETIN 31000 UNITS: 20000 INJECTION, SOLUTION INTRAVENOUS; SUBCUTANEOUS at 11:33

## 2021-02-24 ENCOUNTER — INFUSION (OUTPATIENT)
Dept: ONCOLOGY | Facility: HOSPITAL | Age: 61
End: 2021-02-24

## 2021-02-24 ENCOUNTER — LAB (OUTPATIENT)
Dept: LAB | Facility: HOSPITAL | Age: 61
End: 2021-02-24

## 2021-02-24 DIAGNOSIS — N18.30 ANEMIA IN STAGE 3 CHRONIC KIDNEY DISEASE, UNSPECIFIED WHETHER STAGE 3A OR 3B CKD (HCC): ICD-10-CM

## 2021-02-24 DIAGNOSIS — C50.919 METASTATIC BREAST CANCER: ICD-10-CM

## 2021-02-24 DIAGNOSIS — D50.8 OTHER IRON DEFICIENCY ANEMIA: Primary | ICD-10-CM

## 2021-02-24 DIAGNOSIS — D63.1 ANEMIA IN STAGE 3 CHRONIC KIDNEY DISEASE, UNSPECIFIED WHETHER STAGE 3A OR 3B CKD (HCC): ICD-10-CM

## 2021-02-24 DIAGNOSIS — N18.32 STAGE 3B CHRONIC KIDNEY DISEASE (HCC): ICD-10-CM

## 2021-02-24 DIAGNOSIS — D64.9 NORMOCYTIC ANEMIA: ICD-10-CM

## 2021-02-24 LAB
BASOPHILS # BLD AUTO: 0.06 10*3/MM3 (ref 0–0.2)
BASOPHILS NFR BLD AUTO: 1.6 % (ref 0–1.5)
DEPRECATED RDW RBC AUTO: 64.6 FL (ref 37–54)
EOSINOPHIL # BLD AUTO: 0.07 10*3/MM3 (ref 0–0.4)
EOSINOPHIL NFR BLD AUTO: 1.8 % (ref 0.3–6.2)
ERYTHROCYTE [DISTWIDTH] IN BLOOD BY AUTOMATED COUNT: 17.5 % (ref 12.3–15.4)
HCT VFR BLD AUTO: 25.7 % (ref 34–46.6)
HGB BLD-MCNC: 8.4 G/DL (ref 12–15.9)
IMM GRANULOCYTES # BLD AUTO: 0.04 10*3/MM3 (ref 0–0.05)
IMM GRANULOCYTES NFR BLD AUTO: 1 % (ref 0–0.5)
LYMPHOCYTES # BLD AUTO: 0.67 10*3/MM3 (ref 0.7–3.1)
LYMPHOCYTES NFR BLD AUTO: 17.5 % (ref 19.6–45.3)
MCH RBC QN AUTO: 33.3 PG (ref 26.6–33)
MCHC RBC AUTO-ENTMCNC: 32.7 G/DL (ref 31.5–35.7)
MCV RBC AUTO: 102 FL (ref 79–97)
MONOCYTES # BLD AUTO: 0.34 10*3/MM3 (ref 0.1–0.9)
MONOCYTES NFR BLD AUTO: 8.9 % (ref 5–12)
NEUTROPHILS NFR BLD AUTO: 2.64 10*3/MM3 (ref 1.7–7)
NEUTROPHILS NFR BLD AUTO: 69.2 % (ref 42.7–76)
NRBC BLD AUTO-RTO: 0 /100 WBC (ref 0–0.2)
PLATELET # BLD AUTO: 251 10*3/MM3 (ref 140–450)
PMV BLD AUTO: 9.6 FL (ref 6–12)
RBC # BLD AUTO: 2.52 10*6/MM3 (ref 3.77–5.28)
WBC # BLD AUTO: 3.82 10*3/MM3 (ref 3.4–10.8)

## 2021-02-24 PROCEDURE — 25010000002 EPOETIN ALFA PER 1000 UNITS: Performed by: INTERNAL MEDICINE

## 2021-02-24 PROCEDURE — 85025 COMPLETE CBC W/AUTO DIFF WBC: CPT

## 2021-02-24 PROCEDURE — 96372 THER/PROPH/DIAG INJ SC/IM: CPT

## 2021-02-24 PROCEDURE — 36415 COLL VENOUS BLD VENIPUNCTURE: CPT

## 2021-02-24 RX ADMIN — ERYTHROPOIETIN 31000 UNITS: 20000 INJECTION, SOLUTION INTRAVENOUS; SUBCUTANEOUS at 10:16

## 2021-03-01 DIAGNOSIS — E03.9 ACQUIRED HYPOTHYROIDISM: ICD-10-CM

## 2021-03-02 RX ORDER — LEVOTHYROXINE SODIUM 0.05 MG/1
TABLET ORAL
Qty: 30 TABLET | Refills: 0 | Status: SHIPPED | OUTPATIENT
Start: 2021-03-02 | End: 2021-04-15 | Stop reason: SDUPTHER

## 2021-03-02 NOTE — PROGRESS NOTES
Chief Complaint  Metastatic lobular breast cancer (ER/NH positive, HER-2/tj negative), anemia secondary to CKD3, CHF, peripheral neuropathy, chemotherapy related nausea chemotherapy-induced myelosuppression    Subjective        History of Present Illness  The patient returns today in follow-up with laboratory studies to review, continuing on Aromasin 25 mg daily and Ibrance 100 mg daily for 21/28 days as well as weekly Procrit.  The patient started a new cycle of Ibrance on 3/1/2021.       Objective   Vital Signs:   There were no vitals taken for this visit.    Physical Exam  Constitutional:       Appearance: She is well-developed. She is obese.   Eyes:      Conjunctiva/sclera: Conjunctivae normal.   Neck:      Thyroid: No thyromegaly.   Cardiovascular:      Rate and Rhythm: Normal rate and regular rhythm.      Heart sounds: Murmur present. No friction rub. No gallop.    Pulmonary:      Effort: No respiratory distress.      Breath sounds: Normal breath sounds.   Abdominal:      General: Bowel sounds are normal. There is no distension.      Palpations: Abdomen is soft.      Tenderness: There is no abdominal tenderness.   Musculoskeletal:         General: Swelling present.      Comments: Trace to 1+ bilateral lower extremity edema   Lymphadenopathy:      Head:      Right side of head: No submandibular adenopathy.      Cervical: No cervical adenopathy.      Upper Body:      Right upper body: No supraclavicular adenopathy.      Left upper body: No supraclavicular adenopathy.   Skin:     General: Skin is warm and dry.      Findings: No bruising or rash.   Neurological:      Mental Status: She is alert and oriented to person, place, and time.      Cranial Nerves: No cranial nerve deficit.      Motor: No abnormal muscle tone.      Deep Tendon Reflexes: Reflexes normal.   Psychiatric:         Behavior: Behavior normal.     Patient was examined today, unchanged from above      Result Review : Reviewed CBC, CMP, iron panel,  ferritin from today.  Reviewed stool for occult blood x3.  Reviewed CT chest abdomen pelvis 1/29/2021.  I did personally review CT scans with interpretation as outlined below.       Assessment and Plan  1. Metastatic lobular breast cancer (ER/SD positive, HER-2/tj negative):  · History of stage IIIC right breast cancer ([id number])  · Patient treated previously in the [facility]  · Diagnosis via excisional biopsy 8/23/2003 with lobular carcinoma, 4.5 cm, positive margins.  ER/SD positive, HER-2/tj negative.  · Staging evaluation in September 2003 with negative bone scan and negative CT scans.  Ejection fraction 65%.  · Received neoadjuvant chemotherapy (? GET regimen) which apparently included Taxotere and possibly epirubicin.  Received 6 cycles.  · 1/22/2004 bilateral mastectomy with right axillary dissection.  Per available records, 10-12 cm residual invasive lobular carcinoma in the breast with 14/16 lymph nodes positive with extranodal extension.  Immediate reconstruction.  · Received adjuvant chemotherapy with carboplatin and navelbine x4 cycles  · Initiated adjuvant tamoxifen 6/22/2004  · Adjuvant radiation therapy initiated but interrupted due to chest wall cellulitis requiring removal of implants in August 2004  · Implant reconstruction again attempted with MRSA infection, implant removed 12/30/2005 with no further attempts made and reconstruction.  · Completed 5 years tamoxifen in June 2009 and subsequently transitioned to Femara.  Received Femara until June 2014.  · Patient experienced postmenopausal vaginal bleeding in 2013, negative endometrial biopsy and gynecologic evaluation.  · Patient last seen in [facility] 6/18/2014  · CT chest performed to evaluate bicuspid aortic valve and dilated asending aorta 7/12/2019.  CT showed no change in aorta however showed new free intraperitoneal fluid in the upper abdomen with mesenteric edema, concerning for carcinomatosis.  · CT abdomen and pelvis (without  IV contrast) on 7/16/2019 with mesenteric thickening, small volume of complex fluid in the mesentery which was suspicious and possibly representative of carcinomatosis, small amount of perihepatic fluid, small bowel loops tethered to omentum without obstruction, omental thickening, shotty retroperitoneal and pelvic lymph nodes (non-pathologically enlarged).  · PET scan 7/26/2019 with hypermetabolic omental activity, hypermetabolic small retroperitoneal lymph nodes, hypermetabolic activity throughout uterus with increase in size.  · CT-guided omental biopsy 7/30/2019 with metastatic lobular carcinoma of breast primary, ER positive (greater than 95%), AZ positive (90%), HER-2/tj negative (1+ IHC)  · Baseline CA 15-3 on 7/22/19 was 32.6  · Initiation of Aromasin 25 mg daily 8/12/2019.  Initiated Ibrance at 100 mg 21/28 days on 8/26/2019.  · Improvement in CA 15-3 on 9/17/19-26.3  · Following 2 cycles of Aromasin and Ibrance, CA 15-3 declined to 25.9 on 10/15/2019.  CT scan chest abdomen pelvis 10/16/2019 showed improvement in the mesenteric and omental thickening and near resolution of complex ascites.  Treatment continued.  · Stable findings on subsequent CT chest abdomen pelvis 12/11/2019.  Further improvement in CA 15-3-23.9 on 12/18/2019.  Ibrance/Aromasin continued.   · [facility] medicine liquid biopsy 2/5/2020: MSI undetermined, mutations in BETH, NF1, CHEK2.  No evidence of PIK3CA mutation.  · Slight increase in CA 15-3 to 27.1 on 2/19/2020 and 29.1 on 3/18/2020.  CT however on 3/13/2020 with no new findings.  · Subsequent improvement in CA 15-3-26.5 on 5/15/2020  · CT 7/31/2020 showed evidence of stable disease.  CA 15-3 declined further to 23.1 on 7/24/2020.  · CT 10/19/2020 with stable disease.  Fluctuations in CA 15-3 of unclear significance.  · CA 15-3 on 1/6/2021 decreased to 23.  CT chest abdomen pelvis 1/29/2021 with stable findings.  · Patient returns today in follow-up continuing on Aromasin 25 mg  daily, Ibrance 100 mg daily for 21/28 days (started current cycle Ibrance on 2/1/2021).  Patient is tolerating treatment reasonably well.  She has some mild nausea relieved with Zofran taken prior to Ibrance daily.  She has some mild chronic fatigue.  We did review her CT scans from 1/29/2021 which fortunately show stable findings.  We will therefore continue on with treatment as planned.  I will plan to see her back in 1 month and we will consider repeating scans at the end of 3 months.  2. Normocytic anemia in the setting of CKD3:  · The patient appears to have a chronic anemia with hemoglobin in the 10-11 range with normal MCV.  · Patient has underlying CKD3 with baseline creatinine recently in the 1.5-1.8 range.  · Anemia evaluation 7/22/2019 with iron 30, ferritin 85.2, iron saturation 10%, TIBC 311, B12 370, erythropoietin level 20.4  · Anemia felt in large part to be related to CKD3 as well as to underlying malignancy  · Evidence of folate deficiency 8/12/2019 with level 3.84, initiated folic acid 1 mg daily  · Additional labs on 12/18/2019 with iron 73, ferritin 82.2, iron saturation 25%, TIBC 290.  We did discuss the potential use of Procrit if her hemoglobin declines into the low 9/upper 8 range.  · Decline in hemoglobin into the 8 range, iron studies 7/20/2020 with iron 54, ferritin 66.7, iron saturation 16%, TIBC 328.  On 8/7/2020 proceeded with Injectafer 750 mg IV x1 dose.  Second dose not administered due to onset of fever, subsequent iron studies precluded further administration of IV iron.  · Initiated Procrit 20,000 units every 4 weeks on 9/4/2020.  · On 1/6/2021, hemoglobin declined significantly to 7.1.  This was repeated and verified at 7.2.  No evidence of GI blood loss, stool cards negative for occult blood x3 on 1/12/2021.  Additional labs on 1/6/2021 with iron 47, ferritin 430, iron saturation 20%, TIBC 235, folate greater than 20, B12 324, haptoglobin 525, .  · Transfused 2 unit PRBC  on 1/7/2021  · Change in Procrit dosing to weekly  · Due to low normal B12 level initiated oral B12 1000 mcg daily.  · Hemoglobin today is 8.4, patient received Procrit 25,000 units and we will continue dosing her on a weekly basis for now.  I will see her back in 4 weeks for follow-up.  Repeat iron studies today are adequate.  3. CKD3:  · Baseline creatinine recently in 1.6-2.0 range  · On 9/10/2019, RYAN/CKD3 with creatinine up to 2.44, BUN of 40.  Received 1 L normal saline.  Patient with increase in oral fluid intake.  · Renal ultrasound 9/20/2019 with no evidence of obstructive uropathy.  · Diminished oral intake due to nausea from Ibrance, creatinine 2.79 with BUN 43 on 10/15/2019.  Received 1 L normal saline.  Repeat labs on 10/18/2019 with BUN 31, creatinine 2.0.  · During cycle 3 Ibrance, patient developed increase in creatinine on 11/6/2019 to 2.35 and received 1 L normal saline.  · Patient is trying to maintain adequate oral hydration.    · Gradual escalation of creatinine into the 2-2.4 range and subsequently into the 2.5-2.8 range  · With worsening anemia, creatinine increased into the low 3 range in early January 2021.  Patient increased oral hydration with improvement back into the mid 2 range.  · Today, creatinine is 2.4.  Patient notes continuing with aggressive oral hydration.  She is due for follow-up with nephrology soon.  4. CHF with mild to moderate aortic stenosis:  · Recent cardiology evaluation with echocardiogram 7/12/2019 showing ejection fraction 48% with moderate dilation of the LV cavity, global hypokinesis, grade 2 diastolic dysfunction, mild to moderate aortic stenosis, trivial pericardial effusion.  · CT chest 7/12/2019 with no change in the aorta compared to prior study 12/13/2017.  5. Left upper and bilateral lower extremity peripheral neuropathy:  · Patient reports that left upper extremity neuropathy was not present from previous chemotherapy but occurred after hospitalization  that required intubation and critical care stay.  Apparently felt to represent critical care neuropathy.  Improved over time.  · Bilateral lower extremity neuropathy felt to be related to diabetes  · May have future implications regarding treatment for breast cancer.  6. Uterine/endometrial activity on PET scan:  · Patient experienced postmenopausal vaginal bleeding in 2013, negative endometrial biopsy and gynecologic evaluation.  · With findings on PET scan with enlarging uterus and some hypermetabolic activity, referred back to Dr. Oliveros and gynecology.    · 9/19/2019 D&C with hysteroscopy by Dr. Oliveros, no significant intraoperative findings, pathologic results with benign findings, no evidence of malignancy.  7. Nausea:  · Mild, relieved with Zofran.    8. Myelosuppression secondary to Ibrance:  · With cycle 1, jessica WBC 2.49 with ANC 1.25 and platelet count 140,000.  · With cycle 2, jessica WBC 2.33 with ANC 1.06, maintained normal platelet count  · Today, WBC  4.21 with ANC 2.56  9. Hyperkalemia  · Patient with borderline elevated potassium in the past  · Today, potassium 5.0.       Plan:  1. Continue Aromasin 25 mg daily.    2. Continue Ibrance 100 mg daily for 21/28 days (current cycle Ibrance initiated on 2/1/2021)  3. Continue oral folic acid 1 mg daily  4. Continue oral B12 1000 mcg daily  5. Transfuse 2 unit PRBC tomorrow with 20 mg IV Lasix between units  6. Procrit 25,000 units today  7. Return for weekly CBC and Procrit  8. In 4 weeks MD visit with CBC, CMP.  We anticipate repeat CT scans at 3-month interval from current study.     Patient continues on high risk medication requiring intensive monitoring.

## 2021-03-03 ENCOUNTER — OFFICE VISIT (OUTPATIENT)
Dept: ONCOLOGY | Facility: CLINIC | Age: 61
End: 2021-03-03

## 2021-03-03 ENCOUNTER — LAB (OUTPATIENT)
Dept: LAB | Facility: HOSPITAL | Age: 61
End: 2021-03-03

## 2021-03-03 ENCOUNTER — INFUSION (OUTPATIENT)
Dept: ONCOLOGY | Facility: HOSPITAL | Age: 61
End: 2021-03-03

## 2021-03-03 VITALS
OXYGEN SATURATION: 88 % | SYSTOLIC BLOOD PRESSURE: 151 MMHG | BODY MASS INDEX: 45.99 KG/M2 | HEART RATE: 99 BPM | DIASTOLIC BLOOD PRESSURE: 73 MMHG | WEIGHT: 293 LBS | HEIGHT: 67 IN | TEMPERATURE: 98.7 F | RESPIRATION RATE: 18 BRPM

## 2021-03-03 DIAGNOSIS — D64.9 NORMOCYTIC ANEMIA: ICD-10-CM

## 2021-03-03 DIAGNOSIS — D50.8 OTHER IRON DEFICIENCY ANEMIA: Primary | ICD-10-CM

## 2021-03-03 DIAGNOSIS — N18.30 ANEMIA IN STAGE 3 CHRONIC KIDNEY DISEASE, UNSPECIFIED WHETHER STAGE 3A OR 3B CKD (HCC): ICD-10-CM

## 2021-03-03 DIAGNOSIS — C50.919 METASTATIC BREAST CANCER: Primary | ICD-10-CM

## 2021-03-03 DIAGNOSIS — D63.1 ANEMIA IN STAGE 3 CHRONIC KIDNEY DISEASE, UNSPECIFIED WHETHER STAGE 3A OR 3B CKD (HCC): ICD-10-CM

## 2021-03-03 DIAGNOSIS — C50.919 METASTATIC BREAST CANCER: ICD-10-CM

## 2021-03-03 LAB
ALBUMIN SERPL-MCNC: 3.3 G/DL (ref 3.5–5.2)
ALBUMIN/GLOB SERPL: 0.8 G/DL (ref 1.1–2.4)
ALP SERPL-CCNC: 85 U/L (ref 38–116)
ALT SERPL W P-5'-P-CCNC: <5 U/L (ref 0–33)
ANION GAP SERPL CALCULATED.3IONS-SCNC: 14.3 MMOL/L (ref 5–15)
AST SERPL-CCNC: 10 U/L (ref 0–32)
BASOPHILS # BLD AUTO: 0.09 10*3/MM3 (ref 0–0.2)
BASOPHILS NFR BLD AUTO: 1.5 % (ref 0–1.5)
BILIRUB SERPL-MCNC: 0.6 MG/DL (ref 0.2–1.2)
BUN SERPL-MCNC: 35 MG/DL (ref 6–20)
BUN/CREAT SERPL: 11.9 (ref 7.3–30)
CALCIUM SPEC-SCNC: 8.7 MG/DL (ref 8.5–10.2)
CHLORIDE SERPL-SCNC: 102 MMOL/L (ref 98–107)
CO2 SERPL-SCNC: 18.7 MMOL/L (ref 22–29)
CREAT SERPL-MCNC: 2.94 MG/DL (ref 0.6–1.1)
DEPRECATED RDW RBC AUTO: 65 FL (ref 37–54)
EOSINOPHIL # BLD AUTO: 0.05 10*3/MM3 (ref 0–0.4)
EOSINOPHIL NFR BLD AUTO: 0.8 % (ref 0.3–6.2)
ERYTHROCYTE [DISTWIDTH] IN BLOOD BY AUTOMATED COUNT: 17.2 % (ref 12.3–15.4)
GFR SERPL CREATININE-BSD FRML MDRD: 16 ML/MIN/1.73
GLOBULIN UR ELPH-MCNC: 4.3 GM/DL (ref 1.8–3.5)
GLUCOSE SERPL-MCNC: 274 MG/DL (ref 74–124)
HCT VFR BLD AUTO: 24.9 % (ref 34–46.6)
HGB BLD-MCNC: 7.9 G/DL (ref 12–15.9)
IMM GRANULOCYTES # BLD AUTO: 0.05 10*3/MM3 (ref 0–0.05)
IMM GRANULOCYTES NFR BLD AUTO: 0.8 % (ref 0–0.5)
LYMPHOCYTES # BLD AUTO: 0.74 10*3/MM3 (ref 0.7–3.1)
LYMPHOCYTES NFR BLD AUTO: 12.1 % (ref 19.6–45.3)
MCH RBC QN AUTO: 33.1 PG (ref 26.6–33)
MCHC RBC AUTO-ENTMCNC: 31.7 G/DL (ref 31.5–35.7)
MCV RBC AUTO: 104.2 FL (ref 79–97)
MONOCYTES # BLD AUTO: 0.75 10*3/MM3 (ref 0.1–0.9)
MONOCYTES NFR BLD AUTO: 12.3 % (ref 5–12)
NEUTROPHILS NFR BLD AUTO: 4.42 10*3/MM3 (ref 1.7–7)
NEUTROPHILS NFR BLD AUTO: 72.5 % (ref 42.7–76)
NRBC BLD AUTO-RTO: 0.3 /100 WBC (ref 0–0.2)
PLATELET # BLD AUTO: 271 10*3/MM3 (ref 140–450)
PMV BLD AUTO: 9.6 FL (ref 6–12)
POTASSIUM SERPL-SCNC: 4.7 MMOL/L (ref 3.5–4.7)
PROT SERPL-MCNC: 7.6 G/DL (ref 6.3–8)
RBC # BLD AUTO: 2.39 10*6/MM3 (ref 3.77–5.28)
SODIUM SERPL-SCNC: 135 MMOL/L (ref 134–145)
WBC # BLD AUTO: 6.1 10*3/MM3 (ref 3.4–10.8)

## 2021-03-03 PROCEDURE — 96372 THER/PROPH/DIAG INJ SC/IM: CPT

## 2021-03-03 PROCEDURE — 85025 COMPLETE CBC W/AUTO DIFF WBC: CPT

## 2021-03-03 PROCEDURE — 99215 OFFICE O/P EST HI 40 MIN: CPT | Performed by: INTERNAL MEDICINE

## 2021-03-03 PROCEDURE — 80053 COMPREHEN METABOLIC PANEL: CPT

## 2021-03-03 PROCEDURE — 25010000002 EPOETIN ALFA PER 1000 UNITS: Performed by: INTERNAL MEDICINE

## 2021-03-03 PROCEDURE — 36415 COLL VENOUS BLD VENIPUNCTURE: CPT | Performed by: NURSE PRACTITIONER

## 2021-03-03 RX ADMIN — ERYTHROPOIETIN 40000 UNITS: 40000 INJECTION, SOLUTION INTRAVENOUS; SUBCUTANEOUS at 17:13

## 2021-03-04 ENCOUNTER — MEDICATION THERAPY MANAGEMENT (OUTPATIENT)
Dept: PHARMACY | Facility: HOSPITAL | Age: 61
End: 2021-03-04

## 2021-03-04 ENCOUNTER — E-VISIT (OUTPATIENT)
Dept: FAMILY MEDICINE CLINIC | Facility: CLINIC | Age: 61
End: 2021-03-04

## 2021-03-04 LAB
CHOLEST SERPL-MCNC: 91 MG/DL (ref 100–199)
HBA1C MFR BLD: 5.9 % (ref 4.8–5.6)
HDLC SERPL-MCNC: 25 MG/DL
LDLC SERPL CALC-MCNC: 37 MG/DL (ref 0–99)
T4 FREE SERPL-MCNC: 1.25 NG/DL (ref 0.82–1.77)
TRIGL SERPL-MCNC: 175 MG/DL (ref 0–149)
TSH SERPL DL<=0.005 MIU/L-ACNC: 1.34 UIU/ML (ref 0.45–4.5)
VLDLC SERPL CALC-MCNC: 29 MG/DL (ref 5–40)

## 2021-03-04 PROCEDURE — 99421 OL DIG E/M SVC 5-10 MIN: CPT | Performed by: NURSE PRACTITIONER

## 2021-03-04 NOTE — PROGRESS NOTES
"Chief Complaint  Metastatic lobular breast cancer (ER/IN positive, HER-2/tj negative), anemia secondary to CKD3, CHF, peripheral neuropathy, chemotherapy related nausea chemotherapy-induced myelosuppression    Subjective        History of Present Illness  The patient returns today in follow-up with laboratory studies to review, continuing on Aromasin 25 mg daily and Ibrance 100 mg daily for 21/28 days as well as weekly Procrit.  The patient started a new cycle of Ibrance on 3/1/2021.  The patient reports that she has been fairly stable since her last visit although does note that her fatigue has been somewhat worse over the past week.  Her appetite remains diminished.  She was in to see her nephrologist recently and she is due for follow-up there at a short interval in 6 weeks.  We have been continuing on weekly Procrit and maintaining her hemoglobin in the 8-9 range.  She is tolerating her Ibrance well.  She has no other side effects.  Of note, the patient did express that she was experiencing significant distress today to the medical assistant.  I did explore this with her and she reports that she has been experiencing continued grief after the death of her son in the fall.  She wishes to continue trying to avoid thinking about this or talking about it.  She did become tearful in the office today.  She is very resistant to pursuing any follow-up with psychiatry.  I did offer her services from the supportive oncology clinic and she reports that she will pursue this if she wishes in the future.      Objective   Vital Signs:   /73   Pulse 99   Temp 98.7 °F (37.1 °C) (Temporal)   Resp 18   Ht 170.2 cm (67.01\")   Wt (!) 172 kg (379 lb 14.4 oz)   SpO2 (!) 88%   BMI 59.49 kg/m²     Physical Exam  Constitutional:       Appearance: She is well-developed. She is obese.   Eyes:      Conjunctiva/sclera: Conjunctivae normal.   Neck:      Thyroid: No thyromegaly.   Cardiovascular:      Rate and Rhythm: Normal rate " and regular rhythm.      Heart sounds: Murmur present. No friction rub. No gallop.    Pulmonary:      Effort: No respiratory distress.      Breath sounds: Normal breath sounds.   Abdominal:      General: Bowel sounds are normal. There is no distension.      Palpations: Abdomen is soft.      Tenderness: There is no abdominal tenderness.   Musculoskeletal:         General: Swelling present.      Comments: Trace to 1+ bilateral lower extremity edema   Lymphadenopathy:      Head:      Right side of head: No submandibular adenopathy.      Cervical: No cervical adenopathy.      Upper Body:      Right upper body: No supraclavicular adenopathy.      Left upper body: No supraclavicular adenopathy.   Skin:     General: Skin is warm and dry.      Findings: No bruising or rash.   Neurological:      Mental Status: She is alert and oriented to person, place, and time.      Cranial Nerves: No cranial nerve deficit.      Motor: No abnormal muscle tone.      Deep Tendon Reflexes: Reflexes normal.   Psychiatric:         Behavior: Behavior normal.     Patient was examined today, unchanged from above      Result Review : Reviewed CBC, CMP from today       Assessment and Plan  1. Metastatic lobular breast cancer (ER/MD positive, HER-2/tj negative):  · History of stage IIIC right breast cancer ([id number])  · Patient treated previously in the [facility]  · Diagnosis via excisional biopsy 8/23/2003 with lobular carcinoma, 4.5 cm, positive margins.  ER/MD positive, HER-2/tj negative.  · Staging evaluation in September 2003 with negative bone scan and negative CT scans.  Ejection fraction 65%.  · Received neoadjuvant chemotherapy (? GET regimen) which apparently included Taxotere and possibly epirubicin.  Received 6 cycles.  · 1/22/2004 bilateral mastectomy with right axillary dissection.  Per available records, 10-12 cm residual invasive lobular carcinoma in the breast with 14/16 lymph nodes positive with extranodal extension.   Immediate reconstruction.  · Received adjuvant chemotherapy with carboplatin and navelbine x4 cycles  · Initiated adjuvant tamoxifen 6/22/2004  · Adjuvant radiation therapy initiated but interrupted due to chest wall cellulitis requiring removal of implants in August 2004  · Implant reconstruction again attempted with MRSA infection, implant removed 12/30/2005 with no further attempts made and reconstruction.  · Completed 5 years tamoxifen in June 2009 and subsequently transitioned to Femara.  Received Femara until June 2014.  · Patient experienced postmenopausal vaginal bleeding in 2013, negative endometrial biopsy and gynecologic evaluation.  · Patient last seen in [facility] 6/18/2014  · CT chest performed to evaluate bicuspid aortic valve and dilated asending aorta 7/12/2019.  CT showed no change in aorta however showed new free intraperitoneal fluid in the upper abdomen with mesenteric edema, concerning for carcinomatosis.  · CT abdomen and pelvis (without IV contrast) on 7/16/2019 with mesenteric thickening, small volume of complex fluid in the mesentery which was suspicious and possibly representative of carcinomatosis, small amount of perihepatic fluid, small bowel loops tethered to omentum without obstruction, omental thickening, shotty retroperitoneal and pelvic lymph nodes (non-pathologically enlarged).  · PET scan 7/26/2019 with hypermetabolic omental activity, hypermetabolic small retroperitoneal lymph nodes, hypermetabolic activity throughout uterus with increase in size.  · CT-guided omental biopsy 7/30/2019 with metastatic lobular carcinoma of breast primary, ER positive (greater than 95%), PA positive (90%), HER-2/tj negative (1+ IHC)  · Baseline CA 15-3 on 7/22/19 was 32.6  · Initiation of Aromasin 25 mg daily 8/12/2019.  Initiated Ibrance at 100 mg 21/28 days on 8/26/2019.  · Improvement in CA 15-3 on 9/17/19-26.3  · Following 2 cycles of Aromasin and Ibrance, CA 15-3 declined to 25.9 on  10/15/2019.  CT scan chest abdomen pelvis 10/16/2019 showed improvement in the mesenteric and omental thickening and near resolution of complex ascites.  Treatment continued.  · Stable findings on subsequent CT chest abdomen pelvis 12/11/2019.  Further improvement in CA 15-3-23.9 on 12/18/2019.  Ibrance/Aromasin continued.   · [facility] medicine liquid biopsy 2/5/2020: MSI undetermined, mutations in BETH, NF1, CHEK2.  No evidence of PIK3CA mutation.  · Slight increase in CA 15-3 to 27.1 on 2/19/2020 and 29.1 on 3/18/2020.  CT however on 3/13/2020 with no new findings.  · Subsequent improvement in CA 15-3-26.5 on 5/15/2020  · CT 7/31/2020 showed evidence of stable disease.  CA 15-3 declined further to 23.1 on 7/24/2020.  · CT 10/19/2020 with stable disease.  Fluctuations in CA 15-3 of unclear significance.  · CA 15-3 on 1/6/2021 decreased to 23.  CT chest abdomen pelvis 1/29/2021 with stable findings.  · Patient returns today in follow-up continuing on Aromasin 25 mg daily, Ibrance 100 mg daily for 21/28 days (started current cycle Ibrance on 3/1/2021).  Patient is tolerating treatment reasonably well.  She has some minimal nausea relieved with Zofran taken prior to Ibrance daily.  She has some mild chronic fatigue.  Her counts are acceptable to continue on treatment today.  It is unclear the extent to which Ibrance may be contributing to her anemia.  We will continue monitoring counts weekly with Procrit dosing and see see below).  In 4 weeks she will have nurse practitioner visit and we will check CA 15-3.  In 7 weeks she will undergo CT chest abdomen pelvis and I will see her back in 8 weeks for follow-up.  2. Anemia secondary to CKD3:  · The patient appears to have a chronic anemia with hemoglobin in the 10-11 range with normal MCV.  · Patient has underlying CKD3 with baseline creatinine recently in the 1.5-1.8 range.  · Anemia evaluation 7/22/2019 with iron 30, ferritin 85.2, iron saturation 10%, TIBC 311, B12  370, erythropoietin level 20.4  · Anemia felt in large part to be related to CKD3 as well as to underlying malignancy  · Evidence of folate deficiency 8/12/2019 with level 3.84, initiated folic acid 1 mg daily  · Additional labs on 12/18/2019 with iron 73, ferritin 82.2, iron saturation 25%, TIBC 290.  We did discuss the potential use of Procrit if her hemoglobin declines into the low 9/upper 8 range.  · Decline in hemoglobin into the 8 range, iron studies 7/20/2020 with iron 54, ferritin 66.7, iron saturation 16%, TIBC 328.  On 8/7/2020 proceeded with Injectafer 750 mg IV x1 dose.  Second dose not administered due to onset of fever, subsequent iron studies precluded further administration of IV iron.  · Initiated Procrit 20,000 units every 4 weeks on 9/4/2020.  · On 1/6/2021, hemoglobin declined significantly to 7.1.  This was repeated and verified at 7.2.  No evidence of GI blood loss, stool cards negative for occult blood x3 on 1/12/2021.  Additional labs on 1/6/2021 with iron 47, ferritin 430, iron saturation 20%, TIBC 235, folate greater than 20, B12 324, haptoglobin 525, .  · Transfused 2 unit PRBC on 1/7/2021  · Change in Procrit dosing to weekly  · Due to low normal B12 level initiated oral B12 1000 mcg daily.  · Hemoglobin today has declined slightly to 7.9 and we will increase Procrit dose up to 40,000 units and continue weekly dosing.  It is unclear to what extent Ibrance is contributing to the patient's anemia however she is maintaining adequate WBC and platelet count on treatment.  We will continue to try to maximize Procrit dosing.  We will recheck iron panel and ferritin in 4 weeks.  3. CKD3:  · Baseline creatinine recently in 1.6-2.0 range  · On 9/10/2019, RYAN/CKD3 with creatinine up to 2.44, BUN of 40.  Received 1 L normal saline.  Patient with increase in oral fluid intake.  · Renal ultrasound 9/20/2019 with no evidence of obstructive uropathy.  · Diminished oral intake due to nausea from  Ibrance, creatinine 2.79 with BUN 43 on 10/15/2019.  Received 1 L normal saline.  Repeat labs on 10/18/2019 with BUN 31, creatinine 2.0.  · During cycle 3 Ibrance, patient developed increase in creatinine on 11/6/2019 to 2.35 and received 1 L normal saline.  · Patient is trying to maintain adequate oral hydration.    · Gradual escalation of creatinine into the 2-2.4 range and subsequently into the 2.5-2.8 range  · With worsening anemia, creatinine increased into the low 3 range in early January 2021.  Patient increased oral hydration with improvement back into the mid 2 range.  · Today, creatinine is up to 2.94.  Patient continues close follow-up with nephrology, due back in 6 weeks.  Patient encouraged to continue with oral hydration.  4. CHF with mild to moderate aortic stenosis:  · Recent cardiology evaluation with echocardiogram 7/12/2019 showing ejection fraction 48% with moderate dilation of the LV cavity, global hypokinesis, grade 2 diastolic dysfunction, mild to moderate aortic stenosis, trivial pericardial effusion.  · CT chest 7/12/2019 with no change in the aorta compared to prior study 12/13/2017.  5. Left upper and bilateral lower extremity peripheral neuropathy:  · Patient reports that left upper extremity neuropathy was not present from previous chemotherapy but occurred after hospitalization that required intubation and critical care stay.  Apparently felt to represent critical care neuropathy.  Improved over time.  · Bilateral lower extremity neuropathy felt to be related to diabetes  · May have future implications regarding treatment for breast cancer.  6. Uterine/endometrial activity on PET scan:  · Patient experienced postmenopausal vaginal bleeding in 2013, negative endometrial biopsy and gynecologic evaluation.  · With findings on PET scan with enlarging uterus and some hypermetabolic activity, referred back to Dr. Oliveros and gynecology.    · 9/19/2019 D&C with hysteroscopy by Dr. Oliveros, no  significant intraoperative findings, pathologic results with benign findings, no evidence of malignancy.  7. Nausea:  · Mild, relieved with Zofran.    8. Myelosuppression secondary to Ibrance:  · With cycle 1, jessica WBC 2.49 with ANC 1.25 and platelet count 140,000.  · With cycle 2, jessica WBC 2.33 with ANC 1.06, maintained normal platelet count  · Today, WBC  6.1 with ANC 4.42  9. Hyperkalemia  · Patient with borderline elevated potassium in the past  · Today, potassium 4.7  10. Depression  · Patient indicated increased describes door to medical assistant today and we discussed issues of her depression.  She is reluctant to discuss the situation, wishes to avoid the situation and addressing her feelings.  I have offered her referral to the supportive oncology clinic and she does not wish to pursue that at the present time but will consider the option in the future.  · Patient screened positive for depression based on a PHQ-9 score of 18 on 3/3/2021. Follow-up recommendations include: Referral to Mental Health specialist.  ·      Plan:  1. Continue Aromasin 25 mg daily.    2. Continue Ibrance 100 mg daily for 21/28 days (current cycle Ibrance initiated on 3/1/2021)  3. Continue oral folic acid 1 mg daily  4. Continue oral B12 1000 mcg daily  5. As above, offered patient referral to the supportive oncology clinic.  She does not wish to pursue that at the present time but will notify us if she changes her mind in the future.  6. Proceed with weekly Procrit, dose increased to 40,000 units today  7. Return in 1, 2, 3 weeks for CBC and Procrit  8. In 4 weeks nurse practitioner visit with CBC, CMP, iron panel, ferritin, CA 15-3.    9. In 5, 6, 7 weeks CBC and Procrit  10. In 7 weeks CT chest abdomen pelvis  11. In 8 weeks MD visit with CBC, CMP     Patient continues on high risk medication requiring intensive monitoring.    I did spend 45 minutes with the patient today in record review, face-to-face consultation and,  completion of documentation, coordination of care.

## 2021-03-04 NOTE — PROGRESS NOTES
"Chief Complaint  Metastatic lobular breast cancer (ER/AZ positive, HER-2/tj negative), anemia secondary to CKD3, CHF, peripheral neuropathy, chemotherapy related nausea chemotherapy-induced myelosuppression    Subjective        History of Present Illness  The patient returns today in follow-up with laboratory studies to review, continuing on Aromasin 25 mg daily and Ibrance 100 mg daily for 21/28 days as well as weekly Procrit.  The patient started a new cycle of Ibrance on 3/1/2021.  The patient reports that she has been fairly stable since her last visit although does note that her fatigue has been somewhat worse over the past week.  Her appetite remains diminished.  She was in to see her nephrologist recently and she is due for follow-up there at a short interval in 6 weeks.  We have been continuing on weekly Procrit and maintaining her hemoglobin in the 8-9 range.  She is tolerating her Ibrance well.  She has no other side effects.  Of note, the patient did express that she was experiencing significant distress today to the medical assistant.  I did explore this with her and she reports that she has been experiencing continued grief after the death of her son in the fall.  She wishes to continue trying to avoid thinking about this or talking about it.  She did become tearful in the office today.  She is very resistant to pursuing any follow-up with psychiatry.  I did offer her services from the supportive oncology clinic and she reports that she will pursue this if she wishes in the future.      Objective   Vital Signs:   /73   Pulse 99   Temp 98.7 °F (37.1 °C) (Temporal)   Resp 18   Ht 170.2 cm (67.01\")   Wt (!) 172 kg (379 lb 14.4 oz)   SpO2 (!) 88%   BMI 59.49 kg/m²     Physical Exam  Constitutional:       Appearance: She is well-developed. She is obese.   Eyes:      Conjunctiva/sclera: Conjunctivae normal.   Neck:      Thyroid: No thyromegaly.   Cardiovascular:      Rate and Rhythm: Normal rate " and regular rhythm.      Heart sounds: Murmur present. No friction rub. No gallop.    Pulmonary:      Effort: No respiratory distress.      Breath sounds: Normal breath sounds.   Abdominal:      General: Bowel sounds are normal. There is no distension.      Palpations: Abdomen is soft.      Tenderness: There is no abdominal tenderness.   Musculoskeletal:         General: Swelling present.      Comments: Trace to 1+ bilateral lower extremity edema   Lymphadenopathy:      Head:      Right side of head: No submandibular adenopathy.      Cervical: No cervical adenopathy.      Upper Body:      Right upper body: No supraclavicular adenopathy.      Left upper body: No supraclavicular adenopathy.   Skin:     General: Skin is warm and dry.      Findings: No bruising or rash.   Neurological:      Mental Status: She is alert and oriented to person, place, and time.      Cranial Nerves: No cranial nerve deficit.      Motor: No abnormal muscle tone.      Deep Tendon Reflexes: Reflexes normal.   Psychiatric:         Behavior: Behavior normal.     Patient was examined today, unchanged from above      Result Review : Reviewed CBC, CMP from today       Assessment and Plan  1. Metastatic lobular breast cancer (ER/NM positive, HER-2/tj negative):  · History of stage IIIC right breast cancer ([id number])  · Patient treated previously in the [facility]  · Diagnosis via excisional biopsy 8/23/2003 with lobular carcinoma, 4.5 cm, positive margins.  ER/NM positive, HER-2/tj negative.  · Staging evaluation in September 2003 with negative bone scan and negative CT scans.  Ejection fraction 65%.  · Received neoadjuvant chemotherapy (? GET regimen) which apparently included Taxotere and possibly epirubicin.  Received 6 cycles.  · 1/22/2004 bilateral mastectomy with right axillary dissection.  Per available records, 10-12 cm residual invasive lobular carcinoma in the breast with 14/16 lymph nodes positive with extranodal extension.   Immediate reconstruction.  · Received adjuvant chemotherapy with carboplatin and navelbine x4 cycles  · Initiated adjuvant tamoxifen 6/22/2004  · Adjuvant radiation therapy initiated but interrupted due to chest wall cellulitis requiring removal of implants in August 2004  · Implant reconstruction again attempted with MRSA infection, implant removed 12/30/2005 with no further attempts made and reconstruction.  · Completed 5 years tamoxifen in June 2009 and subsequently transitioned to Femara.  Received Femara until June 2014.  · Patient experienced postmenopausal vaginal bleeding in 2013, negative endometrial biopsy and gynecologic evaluation.  · Patient last seen in [facility] 6/18/2014  · CT chest performed to evaluate bicuspid aortic valve and dilated asending aorta 7/12/2019.  CT showed no change in aorta however showed new free intraperitoneal fluid in the upper abdomen with mesenteric edema, concerning for carcinomatosis.  · CT abdomen and pelvis (without IV contrast) on 7/16/2019 with mesenteric thickening, small volume of complex fluid in the mesentery which was suspicious and possibly representative of carcinomatosis, small amount of perihepatic fluid, small bowel loops tethered to omentum without obstruction, omental thickening, shotty retroperitoneal and pelvic lymph nodes (non-pathologically enlarged).  · PET scan 7/26/2019 with hypermetabolic omental activity, hypermetabolic small retroperitoneal lymph nodes, hypermetabolic activity throughout uterus with increase in size.  · CT-guided omental biopsy 7/30/2019 with metastatic lobular carcinoma of breast primary, ER positive (greater than 95%), UT positive (90%), HER-2/tj negative (1+ IHC)  · Baseline CA 15-3 on 7/22/19 was 32.6  · Initiation of Aromasin 25 mg daily 8/12/2019.  Initiated Ibrance at 100 mg 21/28 days on 8/26/2019.  · Improvement in CA 15-3 on 9/17/19-26.3  · Following 2 cycles of Aromasin and Ibrance, CA 15-3 declined to 25.9 on  10/15/2019.  CT scan chest abdomen pelvis 10/16/2019 showed improvement in the mesenteric and omental thickening and near resolution of complex ascites.  Treatment continued.  · Stable findings on subsequent CT chest abdomen pelvis 12/11/2019.  Further improvement in CA 15-3-23.9 on 12/18/2019.  Ibrance/Aromasin continued.   · [facility] liquid biopsy 2/5/2020: MSI undetermined, mutations in BETH, NF1, CHEK2.  No evidence of PIK3CA mutation.  · Slight increase in CA 15-3 to 27.1 on 2/19/2020 and 29.1 on 3/18/2020.  CT however on 3/13/2020 with no new findings.  · Subsequent improvement in CA 15-3-26.5 on 5/15/2020  · CT 7/31/2020 showed evidence of stable disease.  CA 15-3 declined further to 23.1 on 7/24/2020.  · CT 10/19/2020 with stable disease.  Fluctuations in CA 15-3 of unclear significance.  · CA 15-3 on 1/6/2021 decreased to 23.  CT chest abdomen pelvis 1/29/2021 with stable findings.  · Patient returns today in follow-up continuing on Aromasin 25 mg daily, Ibrance 100 mg daily for 21/28 days (started current cycle Ibrance on 3/1/2021).  Patient is tolerating treatment reasonably well.  She has some minimal nausea relieved with Zofran taken prior to Ibrance daily.  She has some mild chronic fatigue.  Her counts are acceptable to continue on treatment today.  It is unclear the extent to which Ibrance may be contributing to her anemia.  We will continue monitoring counts weekly with Procrit dosing and see see below).  In 4 weeks she will have nurse practitioner visit and we will check CA 15-3.  In 7 weeks she will undergo CT chest abdomen pelvis and I will see her back in 8 weeks for follow-up.  2. Anemia secondary to CKD3:  · The patient appears to have a chronic anemia with hemoglobin in the 10-11 range with normal MCV.  · Patient has underlying CKD3 with baseline creatinine recently in the 1.5-1.8 range.  · Anemia evaluation 7/22/2019 with iron 30, ferritin 85.2, iron saturation 10%, TIBC 311, B12  370, erythropoietin level 20.4  · Anemia felt in large part to be related to CKD3 as well as to underlying malignancy  · Evidence of folate deficiency 8/12/2019 with level 3.84, initiated folic acid 1 mg daily  · Additional labs on 12/18/2019 with iron 73, ferritin 82.2, iron saturation 25%, TIBC 290.  We did discuss the potential use of Procrit if her hemoglobin declines into the low 9/upper 8 range.  · Decline in hemoglobin into the 8 range, iron studies 7/20/2020 with iron 54, ferritin 66.7, iron saturation 16%, TIBC 328.  On 8/7/2020 proceeded with Injectafer 750 mg IV x1 dose.  Second dose not administered due to onset of fever, subsequent iron studies precluded further administration of IV iron.  · Initiated Procrit 20,000 units every 4 weeks on 9/4/2020.  · On 1/6/2021, hemoglobin declined significantly to 7.1.  This was repeated and verified at 7.2.  No evidence of GI blood loss, stool cards negative for occult blood x3 on 1/12/2021.  Additional labs on 1/6/2021 with iron 47, ferritin 430, iron saturation 20%, TIBC 235, folate greater than 20, B12 324, haptoglobin 525, .  · Transfused 2 unit PRBC on 1/7/2021  · Change in Procrit dosing to weekly  · Due to low normal B12 level initiated oral B12 1000 mcg daily.  · Hemoglobin today has declined slightly to 7.9 and we will increase Procrit dose up to 40,000 units and continue weekly dosing.  It is unclear to what extent Ibrance is contributing to the patient's anemia however she is maintaining adequate WBC and platelet count on treatment.  We will continue to try to maximize Procrit dosing.  We will recheck iron panel and ferritin in 4 weeks.  3. CKD3:  · Baseline creatinine recently in 1.6-2.0 range  · On 9/10/2019, RYAN/CKD3 with creatinine up to 2.44, BUN of 40.  Received 1 L normal saline.  Patient with increase in oral fluid intake.  · Renal ultrasound 9/20/2019 with no evidence of obstructive uropathy.  · Diminished oral intake due to nausea from  Ibrance, creatinine 2.79 with BUN 43 on 10/15/2019.  Received 1 L normal saline.  Repeat labs on 10/18/2019 with BUN 31, creatinine 2.0.  · During cycle 3 Ibrance, patient developed increase in creatinine on 11/6/2019 to 2.35 and received 1 L normal saline.  · Patient is trying to maintain adequate oral hydration.    · Gradual escalation of creatinine into the 2-2.4 range and subsequently into the 2.5-2.8 range  · With worsening anemia, creatinine increased into the low 3 range in early January 2021.  Patient increased oral hydration with improvement back into the mid 2 range.  · Today, creatinine is up to 2.94.  Patient continues close follow-up with nephrology, due back in 6 weeks.  Patient encouraged to continue with oral hydration.  4. CHF with mild to moderate aortic stenosis:  · Recent cardiology evaluation with echocardiogram 7/12/2019 showing ejection fraction 48% with moderate dilation of the LV cavity, global hypokinesis, grade 2 diastolic dysfunction, mild to moderate aortic stenosis, trivial pericardial effusion.  · CT chest 7/12/2019 with no change in the aorta compared to prior study 12/13/2017.  5. Left upper and bilateral lower extremity peripheral neuropathy:  · Patient reports that left upper extremity neuropathy was not present from previous chemotherapy but occurred after hospitalization that required intubation and critical care stay.  Apparently felt to represent critical care neuropathy.  Improved over time.  · Bilateral lower extremity neuropathy felt to be related to diabetes  · May have future implications regarding treatment for breast cancer.  6. Uterine/endometrial activity on PET scan:  · Patient experienced postmenopausal vaginal bleeding in 2013, negative endometrial biopsy and gynecologic evaluation.  · With findings on PET scan with enlarging uterus and some hypermetabolic activity, referred back to Dr. Oliveros and gynecology.    · 9/19/2019 D&C with hysteroscopy by Dr. Oliveros, no  significant intraoperative findings, pathologic results with benign findings, no evidence of malignancy.  7. Nausea:  · Mild, relieved with Zofran.    8. Myelosuppression secondary to Ibrance:  · With cycle 1, jessica WBC 2.49 with ANC 1.25 and platelet count 140,000.  · With cycle 2, jessica WBC 2.33 with ANC 1.06, maintained normal platelet count  · Today, WBC  6.1 with ANC 4.42  9. Hyperkalemia  · Patient with borderline elevated potassium in the past  · Today, potassium 4.7  10. Depression  · Patient indicated increased describes door to medical assistant today and we discussed issues of her depression.  She is reluctant to discuss the situation, wishes to avoid the situation and addressing her feelings.  I have offered her referral to the supportive oncology clinic and she does not wish to pursue that at the present time but will consider the option in the future.  · Patient screened positive for depression based on a PHQ-9 score of 18 on 3/3/2021. Follow-up recommendations include: Referral to Mental Health specialist.  ·      Plan:  1. Continue Aromasin 25 mg daily.    2. Continue Ibrance 100 mg daily for 21/28 days (current cycle Ibrance initiated on 3/1/2021)  3. Continue oral folic acid 1 mg daily  4. Continue oral B12 1000 mcg daily  5. As above, offered patient referral to the supportive oncology clinic.  She does not wish to pursue that at the present time but will notify us if she changes her mind in the future.  6. Proceed with weekly Procrit, dose increased to 40,000 units today  7. Return in 1, 2, 3 weeks for CBC and Procrit  8. In 4 weeks nurse practitioner visit with CBC, CMP, iron panel, ferritin, CA 15-3.    9. In 5, 6, 7 weeks CBC and Procrit  10. In 7 weeks CT chest abdomen pelvis  11. In 8 weeks MD visit with CBC, CMP     Patient continues on high risk medication requiring intensive monitoring.    I did spend 45 minutes with the patient today in record review, face-to-face consultation and,  completion of documentation, coordination of care.

## 2021-03-04 NOTE — PROGRESS NOTES
[facility] Lab Review- [facility] ([place])      3/3/2021   WBC 3.40 - 10.80 10*3/mm3 6.10   Neutrophils Absolute 1.70 - 7.00 10*3/mm3 4.42   Hemoglobin 12.0 - 15.9 g/dL 7.9 (A)   Hematocrit 34.0 - 46.6 % 24.9 (A)   Platelets 140 - 450 10*3/mm3 271   Creatinine 0.60 - 1.10 mg/dL 2.94 (A)   eGFR Non African Am >60 mL/min/1.73 16 (A)   BUN 6 - 20 mg/dL 35 (A)   Sodium 134 - 145 mmol/L 135   Potassium 3.5 - 4.7 mmol/L 4.7   Glucose 74 - 124 mg/dL 274 (A)   Calcium 8.5 - 10.2 mg/dL 8.7   Albumin 3.50 - 5.20 g/dL 3.30 (A)   Total Protein 6.3 - 8.0 g/dL 7.6   AST (SGOT) 0 - 32 U/L 10   ALT (SGPT) 0 - 33 U/L <5   Alkaline Phosphatase 38 - 116 U/L 85   Total Bilirubin 0.2 - 1.2 mg/dL 0.6   TSH 0.450 - 4.500 uIU/mL 1.340   Free T4 0.82 - 1.77 ng/dL 1.25     MD dictation noted. Pharmacy will continue to follow.     Thanks,   Myah Argueta, PharmD

## 2021-03-05 RX ORDER — PREDNISONE 20 MG/1
20 TABLET ORAL 2 TIMES DAILY
Qty: 14 TABLET | Refills: 0 | Status: SHIPPED | OUTPATIENT
Start: 2021-03-05 | End: 2021-04-01

## 2021-03-05 NOTE — PROGRESS NOTES
Will treat with prednisone as symptoms started within 7 days, cough is non-productive and no shortness of breath.  F/u if worsening or no improvement.     Total time spent on encounter 8 minutes

## 2021-03-10 PROBLEM — N18.32 STAGE 3B CHRONIC KIDNEY DISEASE (HCC): Status: ACTIVE | Noted: 2021-03-10

## 2021-03-11 ENCOUNTER — INFUSION (OUTPATIENT)
Dept: ONCOLOGY | Facility: HOSPITAL | Age: 61
End: 2021-03-11

## 2021-03-11 ENCOUNTER — LAB (OUTPATIENT)
Dept: LAB | Facility: HOSPITAL | Age: 61
End: 2021-03-11

## 2021-03-11 DIAGNOSIS — D63.1 ANEMIA IN STAGE 3 CHRONIC KIDNEY DISEASE, UNSPECIFIED WHETHER STAGE 3A OR 3B CKD (HCC): ICD-10-CM

## 2021-03-11 DIAGNOSIS — N18.30 ANEMIA IN STAGE 3 CHRONIC KIDNEY DISEASE, UNSPECIFIED WHETHER STAGE 3A OR 3B CKD (HCC): ICD-10-CM

## 2021-03-11 DIAGNOSIS — N18.32 STAGE 3B CHRONIC KIDNEY DISEASE (HCC): Primary | ICD-10-CM

## 2021-03-11 DIAGNOSIS — C50.919 METASTATIC BREAST CANCER: ICD-10-CM

## 2021-03-11 DIAGNOSIS — N18.32 STAGE 3B CHRONIC KIDNEY DISEASE (HCC): ICD-10-CM

## 2021-03-11 LAB
BASOPHILS # BLD AUTO: 0.08 10*3/MM3 (ref 0–0.2)
BASOPHILS NFR BLD AUTO: 2.1 % (ref 0–1.5)
DEPRECATED RDW RBC AUTO: 58.5 FL (ref 37–54)
EOSINOPHIL # BLD AUTO: 0.12 10*3/MM3 (ref 0–0.4)
EOSINOPHIL NFR BLD AUTO: 3.2 % (ref 0.3–6.2)
ERYTHROCYTE [DISTWIDTH] IN BLOOD BY AUTOMATED COUNT: 16 % (ref 12.3–15.4)
HCT VFR BLD AUTO: 25 % (ref 34–46.6)
HGB BLD-MCNC: 8.1 G/DL (ref 12–15.9)
IMM GRANULOCYTES # BLD AUTO: 0.03 10*3/MM3 (ref 0–0.05)
IMM GRANULOCYTES NFR BLD AUTO: 0.8 % (ref 0–0.5)
LYMPHOCYTES # BLD AUTO: 0.8 10*3/MM3 (ref 0.7–3.1)
LYMPHOCYTES NFR BLD AUTO: 21.2 % (ref 19.6–45.3)
MCH RBC QN AUTO: 32.4 PG (ref 26.6–33)
MCHC RBC AUTO-ENTMCNC: 32.4 G/DL (ref 31.5–35.7)
MCV RBC AUTO: 100 FL (ref 79–97)
MONOCYTES # BLD AUTO: 0.26 10*3/MM3 (ref 0.1–0.9)
MONOCYTES NFR BLD AUTO: 6.9 % (ref 5–12)
NEUTROPHILS NFR BLD AUTO: 2.48 10*3/MM3 (ref 1.7–7)
NEUTROPHILS NFR BLD AUTO: 65.8 % (ref 42.7–76)
NRBC BLD AUTO-RTO: 0 /100 WBC (ref 0–0.2)
PLATELET # BLD AUTO: 348 10*3/MM3 (ref 140–450)
PMV BLD AUTO: 9.4 FL (ref 6–12)
RBC # BLD AUTO: 2.5 10*6/MM3 (ref 3.77–5.28)
WBC # BLD AUTO: 3.77 10*3/MM3 (ref 3.4–10.8)

## 2021-03-11 PROCEDURE — 25010000002 EPOETIN ALFA PER 1000 UNITS: Performed by: INTERNAL MEDICINE

## 2021-03-11 PROCEDURE — 85025 COMPLETE CBC W/AUTO DIFF WBC: CPT

## 2021-03-11 PROCEDURE — 36415 COLL VENOUS BLD VENIPUNCTURE: CPT

## 2021-03-11 PROCEDURE — 96372 THER/PROPH/DIAG INJ SC/IM: CPT

## 2021-03-11 RX ADMIN — ERYTHROPOIETIN 40000 UNITS: 40000 INJECTION, SOLUTION INTRAVENOUS; SUBCUTANEOUS at 09:29

## 2021-03-15 RX ORDER — EXEMESTANE 25 MG/1
TABLET ORAL
Qty: 90 TABLET | Refills: 1 | Status: SHIPPED | OUTPATIENT
Start: 2021-03-15 | End: 2021-09-10

## 2021-03-18 ENCOUNTER — MEDICATION THERAPY MANAGEMENT (OUTPATIENT)
Dept: PHARMACY | Facility: HOSPITAL | Age: 61
End: 2021-03-18

## 2021-03-18 ENCOUNTER — INFUSION (OUTPATIENT)
Dept: ONCOLOGY | Facility: HOSPITAL | Age: 61
End: 2021-03-18

## 2021-03-18 ENCOUNTER — LAB (OUTPATIENT)
Dept: LAB | Facility: HOSPITAL | Age: 61
End: 2021-03-18

## 2021-03-18 DIAGNOSIS — N18.30 ANEMIA IN STAGE 3 CHRONIC KIDNEY DISEASE, UNSPECIFIED WHETHER STAGE 3A OR 3B CKD (HCC): ICD-10-CM

## 2021-03-18 DIAGNOSIS — D63.1 ANEMIA IN STAGE 3 CHRONIC KIDNEY DISEASE, UNSPECIFIED WHETHER STAGE 3A OR 3B CKD (HCC): ICD-10-CM

## 2021-03-18 DIAGNOSIS — C50.919 METASTATIC BREAST CANCER: ICD-10-CM

## 2021-03-18 DIAGNOSIS — N18.32 STAGE 3B CHRONIC KIDNEY DISEASE (HCC): Primary | ICD-10-CM

## 2021-03-18 LAB
BASOPHILS # BLD AUTO: 0.03 10*3/MM3 (ref 0–0.2)
BASOPHILS NFR BLD AUTO: 0.8 % (ref 0–1.5)
DEPRECATED RDW RBC AUTO: 58.1 FL (ref 37–54)
EOSINOPHIL # BLD AUTO: 0.04 10*3/MM3 (ref 0–0.4)
EOSINOPHIL NFR BLD AUTO: 1.1 % (ref 0.3–6.2)
ERYTHROCYTE [DISTWIDTH] IN BLOOD BY AUTOMATED COUNT: 16.3 % (ref 12.3–15.4)
HCT VFR BLD AUTO: 23.8 % (ref 34–46.6)
HGB BLD-MCNC: 7.9 G/DL (ref 12–15.9)
IMM GRANULOCYTES # BLD AUTO: 0.04 10*3/MM3 (ref 0–0.05)
IMM GRANULOCYTES NFR BLD AUTO: 1.1 % (ref 0–0.5)
LYMPHOCYTES # BLD AUTO: 0.57 10*3/MM3 (ref 0.7–3.1)
LYMPHOCYTES NFR BLD AUTO: 15.3 % (ref 19.6–45.3)
MCH RBC QN AUTO: 33.3 PG (ref 26.6–33)
MCHC RBC AUTO-ENTMCNC: 33.2 G/DL (ref 31.5–35.7)
MCV RBC AUTO: 100.4 FL (ref 79–97)
MONOCYTES # BLD AUTO: 0.21 10*3/MM3 (ref 0.1–0.9)
MONOCYTES NFR BLD AUTO: 5.6 % (ref 5–12)
NEUTROPHILS NFR BLD AUTO: 2.83 10*3/MM3 (ref 1.7–7)
NEUTROPHILS NFR BLD AUTO: 76.1 % (ref 42.7–76)
NRBC BLD AUTO-RTO: 0 /100 WBC (ref 0–0.2)
PLATELET # BLD AUTO: 247 10*3/MM3 (ref 140–450)
PMV BLD AUTO: 9.9 FL (ref 6–12)
RBC # BLD AUTO: 2.37 10*6/MM3 (ref 3.77–5.28)
WBC # BLD AUTO: 3.72 10*3/MM3 (ref 3.4–10.8)

## 2021-03-18 PROCEDURE — 36415 COLL VENOUS BLD VENIPUNCTURE: CPT

## 2021-03-18 PROCEDURE — 96372 THER/PROPH/DIAG INJ SC/IM: CPT

## 2021-03-18 PROCEDURE — 85025 COMPLETE CBC W/AUTO DIFF WBC: CPT

## 2021-03-18 PROCEDURE — 25010000002 EPOETIN ALFA PER 1000 UNITS: Performed by: INTERNAL MEDICINE

## 2021-03-18 RX ADMIN — ERYTHROPOIETIN 40000 UNITS: 40000 INJECTION, SOLUTION INTRAVENOUS; SUBCUTANEOUS at 09:35

## 2021-03-18 NOTE — PROGRESS NOTES
[facility] telephone follow up- Ibrance    Ms. Hall is doing well today. She continues on Ibrance and exemestane with no new issues or side effects. Medication administration and adherence seem appropriate; she reports no missed doses of these medications. Ms. Hall states that her PCP gave her a prescription for prednisone recently; however, she only took one dose as she was concerned about blood sugar elevations. Ms. Hall denies any other medication or supplement changes. She has no additional questions or concerns for me today.     Thanks,   Myah Argueta, ReinaD

## 2021-03-19 ENCOUNTER — BULK ORDERING (OUTPATIENT)
Dept: CASE MANAGEMENT | Facility: OTHER | Age: 61
End: 2021-03-19

## 2021-03-19 DIAGNOSIS — Z23 IMMUNIZATION DUE: ICD-10-CM

## 2021-03-25 ENCOUNTER — INFUSION (OUTPATIENT)
Dept: ONCOLOGY | Facility: HOSPITAL | Age: 61
End: 2021-03-25

## 2021-03-25 ENCOUNTER — LAB (OUTPATIENT)
Dept: LAB | Facility: HOSPITAL | Age: 61
End: 2021-03-25

## 2021-03-25 DIAGNOSIS — N18.30 ANEMIA IN STAGE 3 CHRONIC KIDNEY DISEASE, UNSPECIFIED WHETHER STAGE 3A OR 3B CKD (HCC): ICD-10-CM

## 2021-03-25 DIAGNOSIS — N18.32 STAGE 3B CHRONIC KIDNEY DISEASE (HCC): Primary | ICD-10-CM

## 2021-03-25 DIAGNOSIS — D63.1 ANEMIA IN STAGE 3 CHRONIC KIDNEY DISEASE, UNSPECIFIED WHETHER STAGE 3A OR 3B CKD (HCC): ICD-10-CM

## 2021-03-25 DIAGNOSIS — C50.919 METASTATIC BREAST CANCER: ICD-10-CM

## 2021-03-25 LAB
ABO GROUP BLD: NORMAL
BASOPHILS # BLD AUTO: 0.04 10*3/MM3 (ref 0–0.2)
BASOPHILS NFR BLD AUTO: 1.6 % (ref 0–1.5)
BLD GP AB SCN SERPL QL: NEGATIVE
DEPRECATED RDW RBC AUTO: 62.7 FL (ref 37–54)
EOSINOPHIL # BLD AUTO: 0.06 10*3/MM3 (ref 0–0.4)
EOSINOPHIL NFR BLD AUTO: 2.4 % (ref 0.3–6.2)
ERYTHROCYTE [DISTWIDTH] IN BLOOD BY AUTOMATED COUNT: 17.3 % (ref 12.3–15.4)
HCT VFR BLD AUTO: 23.2 % (ref 34–46.6)
HGB BLD-MCNC: 7.4 G/DL (ref 12–15.9)
IMM GRANULOCYTES # BLD AUTO: 0.02 10*3/MM3 (ref 0–0.05)
IMM GRANULOCYTES NFR BLD AUTO: 0.8 % (ref 0–0.5)
LYMPHOCYTES # BLD AUTO: 0.62 10*3/MM3 (ref 0.7–3.1)
LYMPHOCYTES NFR BLD AUTO: 25 % (ref 19.6–45.3)
MCH RBC QN AUTO: 32.6 PG (ref 26.6–33)
MCHC RBC AUTO-ENTMCNC: 31.9 G/DL (ref 31.5–35.7)
MCV RBC AUTO: 102.2 FL (ref 79–97)
MONOCYTES # BLD AUTO: 0.28 10*3/MM3 (ref 0.1–0.9)
MONOCYTES NFR BLD AUTO: 11.3 % (ref 5–12)
NEUTROPHILS NFR BLD AUTO: 1.46 10*3/MM3 (ref 1.7–7)
NEUTROPHILS NFR BLD AUTO: 58.9 % (ref 42.7–76)
NRBC BLD AUTO-RTO: 0 /100 WBC (ref 0–0.2)
PLATELET # BLD AUTO: 190 10*3/MM3 (ref 140–450)
PMV BLD AUTO: 10.4 FL (ref 6–12)
RBC # BLD AUTO: 2.27 10*6/MM3 (ref 3.77–5.28)
RH BLD: POSITIVE
T&S EXPIRATION DATE: NORMAL
WBC # BLD AUTO: 2.48 10*3/MM3 (ref 3.4–10.8)

## 2021-03-25 PROCEDURE — 86923 COMPATIBILITY TEST ELECTRIC: CPT

## 2021-03-25 PROCEDURE — 25010000002 EPOETIN ALFA PER 1000 UNITS: Performed by: INTERNAL MEDICINE

## 2021-03-25 PROCEDURE — 86900 BLOOD TYPING SEROLOGIC ABO: CPT

## 2021-03-25 PROCEDURE — 86850 RBC ANTIBODY SCREEN: CPT

## 2021-03-25 PROCEDURE — 85025 COMPLETE CBC W/AUTO DIFF WBC: CPT

## 2021-03-25 PROCEDURE — 96372 THER/PROPH/DIAG INJ SC/IM: CPT

## 2021-03-25 PROCEDURE — 36415 COLL VENOUS BLD VENIPUNCTURE: CPT

## 2021-03-25 PROCEDURE — 86901 BLOOD TYPING SEROLOGIC RH(D): CPT

## 2021-03-25 RX ORDER — FUROSEMIDE 10 MG/ML
20 INJECTION INTRAMUSCULAR; INTRAVENOUS ONCE
Status: CANCELLED | OUTPATIENT
Start: 2021-03-25 | End: 2021-03-25

## 2021-03-25 RX ORDER — SODIUM CHLORIDE 9 MG/ML
250 INJECTION, SOLUTION INTRAVENOUS AS NEEDED
Status: CANCELLED | OUTPATIENT
Start: 2021-03-25

## 2021-03-25 RX ORDER — ACETAMINOPHEN 325 MG/1
325 TABLET ORAL ONCE
Status: CANCELLED | OUTPATIENT
Start: 2021-03-25 | End: 2021-03-25

## 2021-03-25 RX ORDER — DIPHENHYDRAMINE HCL 25 MG
25 CAPSULE ORAL ONCE
Status: CANCELLED | OUTPATIENT
Start: 2021-03-25 | End: 2021-03-25

## 2021-03-25 RX ADMIN — ERYTHROPOIETIN 40000 UNITS: 40000 INJECTION, SOLUTION INTRAVENOUS; SUBCUTANEOUS at 09:47

## 2021-03-25 NOTE — PROGRESS NOTES
Reviewed CBC with Dr Irvin and patient to receive 1uprbcs.  Orders placed to transfuse per Sofi Parisi RN.

## 2021-03-27 ENCOUNTER — INFUSION (OUTPATIENT)
Dept: ONCOLOGY | Facility: HOSPITAL | Age: 61
End: 2021-03-27

## 2021-03-27 VITALS
SYSTOLIC BLOOD PRESSURE: 123 MMHG | OXYGEN SATURATION: 100 % | RESPIRATION RATE: 18 BRPM | TEMPERATURE: 97.6 F | HEART RATE: 71 BPM | DIASTOLIC BLOOD PRESSURE: 70 MMHG

## 2021-03-27 DIAGNOSIS — N18.32 STAGE 3B CHRONIC KIDNEY DISEASE (HCC): ICD-10-CM

## 2021-03-27 DIAGNOSIS — N18.30 ANEMIA IN STAGE 3 CHRONIC KIDNEY DISEASE, UNSPECIFIED WHETHER STAGE 3A OR 3B CKD (HCC): ICD-10-CM

## 2021-03-27 DIAGNOSIS — D63.1 ANEMIA IN STAGE 3 CHRONIC KIDNEY DISEASE, UNSPECIFIED WHETHER STAGE 3A OR 3B CKD (HCC): ICD-10-CM

## 2021-03-27 PROCEDURE — 96374 THER/PROPH/DIAG INJ IV PUSH: CPT

## 2021-03-27 PROCEDURE — 63710000001 DIPHENHYDRAMINE PER 50 MG: Performed by: INTERNAL MEDICINE

## 2021-03-27 PROCEDURE — 25010000002 FUROSEMIDE PER 20 MG: Performed by: INTERNAL MEDICINE

## 2021-03-27 PROCEDURE — 86900 BLOOD TYPING SEROLOGIC ABO: CPT

## 2021-03-27 PROCEDURE — P9016 RBC LEUKOCYTES REDUCED: HCPCS

## 2021-03-27 PROCEDURE — 36430 TRANSFUSION BLD/BLD COMPNT: CPT

## 2021-03-27 RX ORDER — ACETAMINOPHEN 325 MG/1
325 TABLET ORAL ONCE
Status: COMPLETED | OUTPATIENT
Start: 2021-03-27 | End: 2021-03-27

## 2021-03-27 RX ORDER — SODIUM CHLORIDE 9 MG/ML
250 INJECTION, SOLUTION INTRAVENOUS AS NEEDED
Status: DISCONTINUED | OUTPATIENT
Start: 2021-03-27 | End: 2021-03-27 | Stop reason: HOSPADM

## 2021-03-27 RX ORDER — DIPHENHYDRAMINE HCL 25 MG
25 CAPSULE ORAL ONCE
Status: COMPLETED | OUTPATIENT
Start: 2021-03-27 | End: 2021-03-27

## 2021-03-27 RX ORDER — FUROSEMIDE 10 MG/ML
20 INJECTION INTRAMUSCULAR; INTRAVENOUS ONCE
Status: COMPLETED | OUTPATIENT
Start: 2021-03-27 | End: 2021-03-27

## 2021-03-27 RX ADMIN — ACETAMINOPHEN 325 MG: 325 TABLET, FILM COATED ORAL at 08:22

## 2021-03-27 RX ADMIN — FUROSEMIDE 20 MG: 10 INJECTION, SOLUTION INTRAMUSCULAR; INTRAVENOUS at 11:13

## 2021-03-27 RX ADMIN — DIPHENHYDRAMINE HYDROCHLORIDE 25 MG: 25 CAPSULE ORAL at 08:22

## 2021-03-28 LAB
BH BB BLOOD EXPIRATION DATE: NORMAL
BH BB BLOOD TYPE BARCODE: 6200
BH BB DISPENSE STATUS: NORMAL
BH BB PRODUCT CODE: NORMAL
BH BB UNIT NUMBER: NORMAL
CROSSMATCH INTERPRETATION: NORMAL
UNIT  ABO: NORMAL
UNIT  RH: NORMAL

## 2021-04-01 ENCOUNTER — HOSPITAL ENCOUNTER (OUTPATIENT)
Dept: INFUSION THERAPY | Facility: HOSPITAL | Age: 61
Setting detail: INFUSION SERIES
Discharge: HOME | End: 2021-04-01

## 2021-04-01 ENCOUNTER — LAB (OUTPATIENT)
Dept: LAB | Facility: HOSPITAL | Age: 61
End: 2021-04-01

## 2021-04-01 ENCOUNTER — OFFICE VISIT (OUTPATIENT)
Dept: ONCOLOGY | Facility: CLINIC | Age: 61
End: 2021-04-01

## 2021-04-01 ENCOUNTER — INFUSION (OUTPATIENT)
Dept: ONCOLOGY | Facility: HOSPITAL | Age: 61
End: 2021-04-01

## 2021-04-01 VITALS
DIASTOLIC BLOOD PRESSURE: 66 MMHG | HEART RATE: 67 BPM | SYSTOLIC BLOOD PRESSURE: 131 MMHG | RESPIRATION RATE: 20 BRPM | OXYGEN SATURATION: 100 % | TEMPERATURE: 97.5 F

## 2021-04-01 VITALS
RESPIRATION RATE: 16 BRPM | HEART RATE: 84 BPM | TEMPERATURE: 97.1 F | OXYGEN SATURATION: 96 % | SYSTOLIC BLOOD PRESSURE: 111 MMHG | HEIGHT: 67 IN | BODY MASS INDEX: 45.99 KG/M2 | DIASTOLIC BLOOD PRESSURE: 63 MMHG | WEIGHT: 293 LBS

## 2021-04-01 DIAGNOSIS — N18.30 ANEMIA IN STAGE 3 CHRONIC KIDNEY DISEASE, UNSPECIFIED WHETHER STAGE 3A OR 3B CKD (HCC): ICD-10-CM

## 2021-04-01 DIAGNOSIS — D63.1 ANEMIA IN STAGE 3 CHRONIC KIDNEY DISEASE, UNSPECIFIED WHETHER STAGE 3A OR 3B CKD (HCC): ICD-10-CM

## 2021-04-01 DIAGNOSIS — C50.919 METASTATIC BREAST CANCER: ICD-10-CM

## 2021-04-01 DIAGNOSIS — N18.32 STAGE 3B CHRONIC KIDNEY DISEASE (HCC): ICD-10-CM

## 2021-04-01 DIAGNOSIS — N18.32 STAGE 3B CHRONIC KIDNEY DISEASE (HCC): Primary | ICD-10-CM

## 2021-04-01 DIAGNOSIS — C50.919 METASTATIC BREAST CANCER: Primary | ICD-10-CM

## 2021-04-01 LAB
ABO GROUP BLD: NORMAL
ALBUMIN SERPL-MCNC: 3 G/DL (ref 3.5–5.2)
ALBUMIN/GLOB SERPL: 0.7 G/DL (ref 1.1–2.4)
ALP SERPL-CCNC: 89 U/L (ref 38–116)
ALT SERPL W P-5'-P-CCNC: 14 U/L (ref 0–33)
ANION GAP SERPL CALCULATED.3IONS-SCNC: 12.9 MMOL/L (ref 5–15)
AST SERPL-CCNC: 13 U/L (ref 0–32)
BASOPHILS # BLD AUTO: 0.07 10*3/MM3 (ref 0–0.2)
BASOPHILS NFR BLD AUTO: 1.8 % (ref 0–1.5)
BILIRUB SERPL-MCNC: 0.5 MG/DL (ref 0.2–1.2)
BLD GP AB SCN SERPL QL: NEGATIVE
BUN SERPL-MCNC: 37 MG/DL (ref 6–20)
BUN/CREAT SERPL: 11 (ref 7.3–30)
CALCIUM SPEC-SCNC: 8.6 MG/DL (ref 8.5–10.2)
CANCER AG15-3 SERPL-ACNC: 28.4 U/ML
CHLORIDE SERPL-SCNC: 105 MMOL/L (ref 98–107)
CO2 SERPL-SCNC: 21.1 MMOL/L (ref 22–29)
CREAT SERPL-MCNC: 3.35 MG/DL (ref 0.6–1.1)
DEPRECATED RDW RBC AUTO: 66.9 FL (ref 37–54)
EOSINOPHIL # BLD AUTO: 0.04 10*3/MM3 (ref 0–0.4)
EOSINOPHIL NFR BLD AUTO: 1 % (ref 0.3–6.2)
ERYTHROCYTE [DISTWIDTH] IN BLOOD BY AUTOMATED COUNT: 17.4 % (ref 12.3–15.4)
FERRITIN SERPL-MCNC: 445.7 NG/ML (ref 13–150)
GFR SERPL CREATININE-BSD FRML MDRD: 14 ML/MIN/1.73
GFR SERPL CREATININE-BSD FRML MDRD: ABNORMAL ML/MIN/{1.73_M2}
GLOBULIN UR ELPH-MCNC: 4.3 GM/DL (ref 1.8–3.5)
GLUCOSE SERPL-MCNC: 171 MG/DL (ref 74–124)
HCT VFR BLD AUTO: 25 % (ref 34–46.6)
HGB BLD-MCNC: 7.7 G/DL (ref 12–15.9)
IMM GRANULOCYTES # BLD AUTO: 0.06 10*3/MM3 (ref 0–0.05)
IMM GRANULOCYTES NFR BLD AUTO: 1.6 % (ref 0–0.5)
IRON 24H UR-MRATE: 34 MCG/DL (ref 37–145)
IRON SATN MFR SERPL: 17 % (ref 14–48)
LYMPHOCYTES # BLD AUTO: 0.62 10*3/MM3 (ref 0.7–3.1)
LYMPHOCYTES NFR BLD AUTO: 16.2 % (ref 19.6–45.3)
MCH RBC QN AUTO: 32 PG (ref 26.6–33)
MCHC RBC AUTO-ENTMCNC: 30.8 G/DL (ref 31.5–35.7)
MCV RBC AUTO: 103.7 FL (ref 79–97)
MONOCYTES # BLD AUTO: 0.45 10*3/MM3 (ref 0.1–0.9)
MONOCYTES NFR BLD AUTO: 11.8 % (ref 5–12)
NEUTROPHILS NFR BLD AUTO: 2.58 10*3/MM3 (ref 1.7–7)
NEUTROPHILS NFR BLD AUTO: 67.6 % (ref 42.7–76)
NRBC BLD AUTO-RTO: 0.5 /100 WBC (ref 0–0.2)
PLATELET # BLD AUTO: 273 10*3/MM3 (ref 140–450)
PMV BLD AUTO: 9.4 FL (ref 6–12)
POTASSIUM SERPL-SCNC: 5 MMOL/L (ref 3.5–4.7)
PROT SERPL-MCNC: 7.3 G/DL (ref 6.3–8)
RBC # BLD AUTO: 2.41 10*6/MM3 (ref 3.77–5.28)
RH BLD: POSITIVE
SODIUM SERPL-SCNC: 139 MMOL/L (ref 134–145)
T&S EXPIRATION DATE: NORMAL
TIBC SERPL-MCNC: 203 MCG/DL (ref 249–505)
TRANSFERRIN SERPL-MCNC: 145 MG/DL (ref 200–360)
WBC # BLD AUTO: 3.82 10*3/MM3 (ref 3.4–10.8)

## 2021-04-01 PROCEDURE — 96372 THER/PROPH/DIAG INJ SC/IM: CPT

## 2021-04-01 PROCEDURE — 80053 COMPREHEN METABOLIC PANEL: CPT

## 2021-04-01 PROCEDURE — 85025 COMPLETE CBC W/AUTO DIFF WBC: CPT

## 2021-04-01 PROCEDURE — 83540 ASSAY OF IRON: CPT

## 2021-04-01 PROCEDURE — 36415 COLL VENOUS BLD VENIPUNCTURE: CPT

## 2021-04-01 PROCEDURE — 82728 ASSAY OF FERRITIN: CPT

## 2021-04-01 PROCEDURE — 86900 BLOOD TYPING SEROLOGIC ABO: CPT

## 2021-04-01 PROCEDURE — 99214 OFFICE O/P EST MOD 30 MIN: CPT | Performed by: NURSE PRACTITIONER

## 2021-04-01 PROCEDURE — 84466 ASSAY OF TRANSFERRIN: CPT

## 2021-04-01 PROCEDURE — 86300 IMMUNOASSAY TUMOR CA 15-3: CPT | Performed by: INTERNAL MEDICINE

## 2021-04-01 PROCEDURE — 25010000002 EPOETIN ALFA PER 1000 UNITS: Performed by: INTERNAL MEDICINE

## 2021-04-01 PROCEDURE — P9016 RBC LEUKOCYTES REDUCED: HCPCS

## 2021-04-01 PROCEDURE — 86901 BLOOD TYPING SEROLOGIC RH(D): CPT | Performed by: NURSE PRACTITIONER

## 2021-04-01 PROCEDURE — 86923 COMPATIBILITY TEST ELECTRIC: CPT

## 2021-04-01 PROCEDURE — 63710000001 DIPHENHYDRAMINE PER 50 MG: Performed by: NURSE PRACTITIONER

## 2021-04-01 PROCEDURE — 86850 RBC ANTIBODY SCREEN: CPT | Performed by: NURSE PRACTITIONER

## 2021-04-01 PROCEDURE — 36430 TRANSFUSION BLD/BLD COMPNT: CPT

## 2021-04-01 PROCEDURE — 86900 BLOOD TYPING SEROLOGIC ABO: CPT | Performed by: NURSE PRACTITIONER

## 2021-04-01 RX ORDER — ACETAMINOPHEN 325 MG/1
650 TABLET ORAL ONCE
Status: COMPLETED | OUTPATIENT
Start: 2021-04-01 | End: 2021-04-01

## 2021-04-01 RX ORDER — SODIUM CHLORIDE 9 MG/ML
250 INJECTION, SOLUTION INTRAVENOUS AS NEEDED
Status: CANCELLED | OUTPATIENT
Start: 2021-04-01

## 2021-04-01 RX ORDER — DIPHENHYDRAMINE HCL 25 MG
25 CAPSULE ORAL ONCE
Status: COMPLETED | OUTPATIENT
Start: 2021-04-01 | End: 2021-04-01

## 2021-04-01 RX ORDER — AMLODIPINE BESYLATE 5 MG/1
1 TABLET ORAL DAILY
COMMUNITY
Start: 2021-03-27 | End: 2021-07-19 | Stop reason: SDUPTHER

## 2021-04-01 RX ORDER — ACETAMINOPHEN 325 MG/1
650 TABLET ORAL ONCE
Status: CANCELLED | OUTPATIENT
Start: 2021-04-01 | End: 2021-04-01

## 2021-04-01 RX ORDER — DIPHENHYDRAMINE HCL 25 MG
25 CAPSULE ORAL ONCE
Status: CANCELLED | OUTPATIENT
Start: 2021-04-01 | End: 2021-04-01

## 2021-04-01 RX ORDER — SODIUM CHLORIDE 9 MG/ML
250 INJECTION, SOLUTION INTRAVENOUS AS NEEDED
Status: DISCONTINUED | OUTPATIENT
Start: 2021-04-01 | End: 2021-04-03 | Stop reason: HOSPADM

## 2021-04-01 RX ADMIN — ACETAMINOPHEN 650 MG: 325 TABLET, FILM COATED ORAL at 12:22

## 2021-04-01 RX ADMIN — DIPHENHYDRAMINE HYDROCHLORIDE 25 MG: 25 CAPSULE ORAL at 12:22

## 2021-04-01 RX ADMIN — ERYTHROPOIETIN 50000 UNITS: 40000 INJECTION, SOLUTION INTRAVENOUS; SUBCUTANEOUS at 11:04

## 2021-04-01 NOTE — PROGRESS NOTES
"Chief Complaint  Metastatic lobular breast cancer (ER/ME positive, HER-2/tj negative), anemia secondary to CKD3, CHF, peripheral neuropathy, chemotherapy related nausea chemotherapy-induced myelosuppression    Subjective        History of Present Illness  The patient returns today in follow-up with laboratory studies to review, continuing on Aromasin 25 mg daily and Ibrance 100 mg daily for 21/28 days as well as weekly Procrit.  The patient started a new cycle of Ibrance on 3/28/2021.  She reports she tolerates her Ibrance well, without nausea, vomiting, diarrhea.  She denies any new pain.  She tolerates the Aromasin well.  Unfortunately, she does report she has become progressively more symptomatic of her anemia.  She received transfusion on Saturday, 3/27/2021 which she tolerated well and noted some improvement in her energy.  However, this morning when changing position she became exceptionally lightheaded and had a near syncopal episode.  Her symptoms improved with lying down and changing positions more slowly.  She does have shortness of breath which is chronic and unchanged from her baseline.    Objective   Vital Signs:   /63   Pulse 84   Temp 97.1 °F (36.2 °C) (Skin)   Resp 16   Ht 170.2 cm (67.01\")   Wt (!) 171 kg (376 lb 6.4 oz)   SpO2 96%   BMI 58.94 kg/m²         Physical Exam  Constitutional:       Appearance: She is well-developed. She is obese.   Eyes:      Conjunctiva/sclera: Conjunctivae normal.   Neck:      Thyroid: No thyromegaly.   Cardiovascular:      Rate and Rhythm: Normal rate and regular rhythm.      Heart sounds: Murmur present. No friction rub. No gallop.    Pulmonary:      Effort: No respiratory distress.      Breath sounds: Normal breath sounds.   Abdominal:      General: Bowel sounds are normal. There is no distension.      Palpations: Abdomen is soft.      Tenderness: There is no abdominal tenderness.   Musculoskeletal:         General: Swelling present.      Comments: " Trace to 1+ bilateral lower extremity edema   Skin:     General: Skin is warm and dry.      Findings: No bruising or rash.   Neurological:      Mental Status: She is alert and oriented to person, place, and time.      Cranial Nerves: No cranial nerve deficit.      Motor: No abnormal muscle tone.      Deep Tendon Reflexes: Reflexes normal.   Psychiatric:         Behavior: Behavior normal.     Patient was examined today, unchanged from above      Result Review : Reviewed CBC, CMP, ferritin, iron profile from today, 4/1/2021       Assessment and Plan  1. Metastatic lobular breast cancer (ER/CO positive, HER-2/tj negative):  · History of stage IIIC right breast cancer ([id number])  · Patient treated previously in the [facility]  · Diagnosis via excisional biopsy 8/23/2003 with lobular carcinoma, 4.5 cm, positive margins.  ER/CO positive, HER-2/tj negative.  · Staging evaluation in September 2003 with negative bone scan and negative CT scans.  Ejection fraction 65%.  · Received neoadjuvant chemotherapy (? GET regimen) which apparently included Taxotere and possibly epirubicin.  Received 6 cycles.  · 1/22/2004 bilateral mastectomy with right axillary dissection.  Per available records, 10-12 cm residual invasive lobular carcinoma in the breast with 14/16 lymph nodes positive with extranodal extension.  Immediate reconstruction.  · Received adjuvant chemotherapy with carboplatin and navelbine x4 cycles  · Initiated adjuvant tamoxifen 6/22/2004  · Adjuvant radiation therapy initiated but interrupted due to chest wall cellulitis requiring removal of implants in August 2004  · Implant reconstruction again attempted with MRSA infection, implant removed 12/30/2005 with no further attempts made and reconstruction.  · Completed 5 years tamoxifen in June 2009 and subsequently transitioned to Femara.  Received Femara until June 2014.  · Patient experienced postmenopausal vaginal bleeding in 2013, negative endometrial biopsy  and gynecologic evaluation.  · Patient last seen in [facility] 6/18/2014  · CT chest performed to evaluate bicuspid aortic valve and dilated asending aorta 7/12/2019.  CT showed no change in aorta however showed new free intraperitoneal fluid in the upper abdomen with mesenteric edema, concerning for carcinomatosis.  · CT abdomen and pelvis (without IV contrast) on 7/16/2019 with mesenteric thickening, small volume of complex fluid in the mesentery which was suspicious and possibly representative of carcinomatosis, small amount of perihepatic fluid, small bowel loops tethered to omentum without obstruction, omental thickening, shotty retroperitoneal and pelvic lymph nodes (non-pathologically enlarged).  · PET scan 7/26/2019 with hypermetabolic omental activity, hypermetabolic small retroperitoneal lymph nodes, hypermetabolic activity throughout uterus with increase in size.  · CT-guided omental biopsy 7/30/2019 with metastatic lobular carcinoma of breast primary, ER positive (greater than 95%), SD positive (90%), HER-2/tj negative (1+ IHC)  · Baseline CA 15-3 on 7/22/19 was 32.6  · Initiation of Aromasin 25 mg daily 8/12/2019.  Initiated Ibrance at 100 mg 21/28 days on 8/26/2019.  · Improvement in CA 15-3 on 9/17/19-26.3  · Following 2 cycles of Aromasin and Ibrance, CA 15-3 declined to 25.9 on 10/15/2019.  CT scan chest abdomen pelvis 10/16/2019 showed improvement in the mesenteric and omental thickening and near resolution of complex ascites.  Treatment continued.  · Stable findings on subsequent CT chest abdomen pelvis 12/11/2019.  Further improvement in CA 15-3-23.9 on 12/18/2019.  Ibrance/Aromasin continued.   · [facility] medicine liquid biopsy 2/5/2020: MSI undetermined, mutations in BETH, NF1, CHEK2.  No evidence of PIK3CA mutation.  · Slight increase in CA 15-3 to 27.1 on 2/19/2020 and 29.1 on 3/18/2020.  CT however on 3/13/2020 with no new findings.  · Subsequent improvement in CA 15-3-26.5 on  5/15/2020  · CT 7/31/2020 showed evidence of stable disease.  CA 15-3 declined further to 23.1 on 7/24/2020.  · CT 10/19/2020 with stable disease.  Fluctuations in CA 15-3 of unclear significance.  · CA 15-3 on 1/6/2021 decreased to 23.  CT chest abdomen pelvis 1/29/2021 with stable findings.  · Patient returns today in follow-up continuing on Aromasin 25 mg daily, Ibrance 100 mg daily for 21/28 days (started current cycle Ibrance on 3/29/2021).  Patient is tolerating treatment reasonably well.   It is unclear the extent to which Ibrance may be contributing to her anemia.  We will continue monitoring counts weekly with Procrit dosing and see see below).  CA 15-3 is pending today.  In 3 weeks she will undergo CT chest abdomen pelvis and Dr. Irvin will see her back in 4 weeks for follow-up.  2. Anemia secondary to CKD3:  · The patient appears to have a chronic anemia with hemoglobin in the 10-11 range with normal MCV.  · Patient has underlying CKD3 with baseline creatinine recently in the 1.5-1.8 range.  · Anemia evaluation 7/22/2019 with iron 30, ferritin 85.2, iron saturation 10%, TIBC 311, B12 370, erythropoietin level 20.4  · Anemia felt in large part to be related to CKD3 as well as to underlying malignancy  · Evidence of folate deficiency 8/12/2019 with level 3.84, initiated folic acid 1 mg daily  · Additional labs on 12/18/2019 with iron 73, ferritin 82.2, iron saturation 25%, TIBC 290.  We did discuss the potential use of Procrit if her hemoglobin declines into the low 9/upper 8 range.  · Decline in hemoglobin into the 8 range, iron studies 7/20/2020 with iron 54, ferritin 66.7, iron saturation 16%, TIBC 328.  On 8/7/2020 proceeded with Injectafer 750 mg IV x1 dose.  Second dose not administered due to onset of fever, subsequent iron studies precluded further administration of IV iron.  · Initiated Procrit 20,000 units every 4 weeks on 9/4/2020.  · On 1/6/2021, hemoglobin declined significantly to 7.1.  This  was repeated and verified at 7.2.  No evidence of GI blood loss, stool cards negative for occult blood x3 on 1/12/2021.  Additional labs on 1/6/2021 with iron 47, ferritin 430, iron saturation 20%, TIBC 235, folate greater than 20, B12 324, haptoglobin 525, .  · Transfused 2 unit PRBC on 1/7/2021  · Change in Procrit dosing to weekly  · Due to low normal B12 level initiated oral B12 1000 mcg daily.  · Hemoglobin declined to 7.4 3/25/2021, transfuse 1 unit packed red blood cells  · Today, 4/1/2021, hemoglobin of 7.7.  Unfortunately, the patient is symptomatic with lightheadedness and near syncopal episode this morning.  We will therefore transfuse 1 unit of packed red blood cells.  She will continue to receive 40,000 units of Procrit weekly.  Labs today without iron deficiency, ferritin 445, iron saturation 17%.  · With elevated creatinine, I am hesitant to proceed with Lasix today, will therefore transfuse 1 unit over 3 hours.  3. CKD3:  · Baseline creatinine recently in 1.6-2.0 range  · On 9/10/2019, RYAN/CKD3 with creatinine up to 2.44, BUN of 40.  Received 1 L normal saline.  Patient with increase in oral fluid intake.  · Renal ultrasound 9/20/2019 with no evidence of obstructive uropathy.  · Diminished oral intake due to nausea from Ibrance, creatinine 2.79 with BUN 43 on 10/15/2019.  Received 1 L normal saline.  Repeat labs on 10/18/2019 with BUN 31, creatinine 2.0.  · During cycle 3 Ibrance, patient developed increase in creatinine on 11/6/2019 to 2.35 and received 1 L normal saline.  · Patient is trying to maintain adequate oral hydration.    · Gradual escalation of creatinine into the 2-2.4 range and subsequently into the 2.5-2.8 range  · With worsening anemia, creatinine increased into the low 3 range in early January 2021.  Patient increased oral hydration with improvement back into the mid 2 range.  · Worsening of creatinine today at 3.35, BUN 37.  The patient reports drinking 75 ounces of liquid  daily.  Labs will be sent to nephrology, Dr. Annabelle Ledesma  · Follow-up with nephrology as currently scheduled in 3 weeks  · I reviewed Ibrance dosing today, no dose adjustment is required for worsening creatinine  4. CHF with mild to moderate aortic stenosis:  · Recent cardiology evaluation with echocardiogram 7/12/2019 showing ejection fraction 48% with moderate dilation of the LV cavity, global hypokinesis, grade 2 diastolic dysfunction, mild to moderate aortic stenosis, trivial pericardial effusion.  · CT chest 7/12/2019 with no change in the aorta compared to prior study 12/13/2017.  5. Left upper and bilateral lower extremity peripheral neuropathy:  · Patient reports that left upper extremity neuropathy was not present from previous chemotherapy but occurred after hospitalization that required intubation and critical care stay.  Apparently felt to represent critical care neuropathy.  Improved over time.  · Bilateral lower extremity neuropathy felt to be related to diabetes  · May have future implications regarding treatment for breast cancer.  6. Uterine/endometrial activity on PET scan:  · Patient experienced postmenopausal vaginal bleeding in 2013, negative endometrial biopsy and gynecologic evaluation.  · With findings on PET scan with enlarging uterus and some hypermetabolic activity, referred back to Dr. Oliveros and gynecology.    · 9/19/2019 D&C with hysteroscopy by Dr. Oliveros, no significant intraoperative findings, pathologic results with benign findings, no evidence of malignancy.  7. Nausea:  · Mild, relieved with Zofran.    8. Myelosuppression secondary to Ibrance:  · With cycle 1, jessica WBC 2.49 with ANC 1.25 and platelet count 140,000.  · With cycle 2, jessica WBC 2.33 with ANC 1.06, maintained normal platelet count  · Today, WBC  3.82, ANC 2.58  9. Hyperkalemia  · Patient with borderline elevated potassium in the past  · Today, potassium 5.0, likely secondary to elevated  creatinine  10. Depression  · Patient indicated increased describes door to medical assistant today and we discussed issues of her depression.  She is reluctant to discuss the situation, wishes to avoid the situation and addressing her feelings.  Dr. Irvin has offered her referral to the supportive oncology clinic and she does not wish to pursue that at the present time but will consider the option in the future.  · Patient screened positive for depression based on a PHQ-9 score of 18 on 3/3/2021. Follow-up recommendations include: Referral to Mental Health specialist.     Plan:  1. Continue Aromasin 25 mg daily.    2. Continue Ibrance 100 mg daily for 21/28 days (current cycle Ibrance initiated on 3/1/2021)  3. Continue oral folic acid 1 mg daily  4. Continue oral B12 1000 mcg daily  5. Proceed with weekly Procrit, dose increased to 40,000 units today  6. Proceed with transfusion of 1 unit packed red blood cells, the patient had a light headed episode this morning with position changes  7. Return in 1, 2, 3 weeks for CBC and Procrit  8. In 3 weeks CT chest abdomen pelvis  9. In 4 weeks MD visit with CBC, CMP  10. Labs will be sent to Dr. Ledesma, follow-up as currently scheduled in 3 weeks with nephrology.  The patient is maintaining adequate hydration drinking 75 ounces daily.     Patient continues on high risk medication requiring intensive monitoring.    Annabelle Atwood, APRN  04/01/2021

## 2021-04-01 NOTE — PROGRESS NOTES
Patient tolerated transfusion without difficulty.  VSS.  Outpatient lunch box served and ate well.  Warm blanket and recliner with feet elevated for comfort.  Lights out and patient napped at intervals throughout transfusion.  Patient called her  and he is to pick her up in the front entrance.  Patient refused AVS and was discharged via her personal motorized wheel chair after appointment completed.

## 2021-04-08 ENCOUNTER — LAB (OUTPATIENT)
Dept: LAB | Facility: HOSPITAL | Age: 61
End: 2021-04-08

## 2021-04-08 ENCOUNTER — INFUSION (OUTPATIENT)
Dept: ONCOLOGY | Facility: HOSPITAL | Age: 61
End: 2021-04-08

## 2021-04-08 DIAGNOSIS — N18.30 ANEMIA IN STAGE 3 CHRONIC KIDNEY DISEASE, UNSPECIFIED WHETHER STAGE 3A OR 3B CKD (HCC): ICD-10-CM

## 2021-04-08 DIAGNOSIS — N18.32 STAGE 3B CHRONIC KIDNEY DISEASE (HCC): Primary | ICD-10-CM

## 2021-04-08 DIAGNOSIS — D63.1 ANEMIA IN STAGE 3 CHRONIC KIDNEY DISEASE, UNSPECIFIED WHETHER STAGE 3A OR 3B CKD (HCC): ICD-10-CM

## 2021-04-08 DIAGNOSIS — C50.919 METASTATIC BREAST CANCER: ICD-10-CM

## 2021-04-08 LAB
BASOPHILS # BLD AUTO: 0.09 10*3/MM3 (ref 0–0.2)
BASOPHILS NFR BLD AUTO: 1.6 % (ref 0–1.5)
DEPRECATED RDW RBC AUTO: 60.2 FL (ref 37–54)
EOSINOPHIL # BLD AUTO: 0.08 10*3/MM3 (ref 0–0.4)
EOSINOPHIL NFR BLD AUTO: 1.5 % (ref 0.3–6.2)
ERYTHROCYTE [DISTWIDTH] IN BLOOD BY AUTOMATED COUNT: 17 % (ref 12.3–15.4)
HCT VFR BLD AUTO: 26.7 % (ref 34–46.6)
HGB BLD-MCNC: 8.7 G/DL (ref 12–15.9)
IMM GRANULOCYTES # BLD AUTO: 0.04 10*3/MM3 (ref 0–0.05)
IMM GRANULOCYTES NFR BLD AUTO: 0.7 % (ref 0–0.5)
LYMPHOCYTES # BLD AUTO: 0.73 10*3/MM3 (ref 0.7–3.1)
LYMPHOCYTES NFR BLD AUTO: 13.3 % (ref 19.6–45.3)
MCH RBC QN AUTO: 32.5 PG (ref 26.6–33)
MCHC RBC AUTO-ENTMCNC: 32.6 G/DL (ref 31.5–35.7)
MCV RBC AUTO: 99.6 FL (ref 79–97)
MONOCYTES # BLD AUTO: 0.34 10*3/MM3 (ref 0.1–0.9)
MONOCYTES NFR BLD AUTO: 6.2 % (ref 5–12)
NEUTROPHILS NFR BLD AUTO: 4.21 10*3/MM3 (ref 1.7–7)
NEUTROPHILS NFR BLD AUTO: 76.7 % (ref 42.7–76)
NRBC BLD AUTO-RTO: 0 /100 WBC (ref 0–0.2)
PLATELET # BLD AUTO: 324 10*3/MM3 (ref 140–450)
PMV BLD AUTO: 9.5 FL (ref 6–12)
RBC # BLD AUTO: 2.68 10*6/MM3 (ref 3.77–5.28)
WBC # BLD AUTO: 5.49 10*3/MM3 (ref 3.4–10.8)

## 2021-04-08 PROCEDURE — 96372 THER/PROPH/DIAG INJ SC/IM: CPT

## 2021-04-08 PROCEDURE — 25010000002 EPOETIN ALFA PER 1000 UNITS: Performed by: INTERNAL MEDICINE

## 2021-04-08 PROCEDURE — 36415 COLL VENOUS BLD VENIPUNCTURE: CPT

## 2021-04-08 PROCEDURE — 85025 COMPLETE CBC W/AUTO DIFF WBC: CPT

## 2021-04-08 RX ADMIN — ERYTHROPOIETIN 50000 UNITS: 40000 INJECTION, SOLUTION INTRAVENOUS; SUBCUTANEOUS at 10:03

## 2021-04-15 ENCOUNTER — INFUSION (OUTPATIENT)
Dept: ONCOLOGY | Facility: HOSPITAL | Age: 61
End: 2021-04-15

## 2021-04-15 ENCOUNTER — LAB (OUTPATIENT)
Dept: LAB | Facility: HOSPITAL | Age: 61
End: 2021-04-15

## 2021-04-15 ENCOUNTER — OFFICE VISIT (OUTPATIENT)
Dept: FAMILY MEDICINE CLINIC | Facility: CLINIC | Age: 61
End: 2021-04-15

## 2021-04-15 VITALS
RESPIRATION RATE: 16 BRPM | TEMPERATURE: 97.5 F | OXYGEN SATURATION: 96 % | HEART RATE: 75 BPM | HEIGHT: 67 IN | SYSTOLIC BLOOD PRESSURE: 110 MMHG | DIASTOLIC BLOOD PRESSURE: 60 MMHG | WEIGHT: 293 LBS | BODY MASS INDEX: 45.99 KG/M2

## 2021-04-15 DIAGNOSIS — N18.30 ANEMIA IN STAGE 3 CHRONIC KIDNEY DISEASE, UNSPECIFIED WHETHER STAGE 3A OR 3B CKD (HCC): ICD-10-CM

## 2021-04-15 DIAGNOSIS — E78.2 MIXED HYPERLIPIDEMIA: ICD-10-CM

## 2021-04-15 DIAGNOSIS — E11.9 CONTROLLED TYPE 2 DIABETES MELLITUS WITHOUT COMPLICATION, WITH LONG-TERM CURRENT USE OF INSULIN (HCC): ICD-10-CM

## 2021-04-15 DIAGNOSIS — F32.A ANXIETY AND DEPRESSION: Primary | ICD-10-CM

## 2021-04-15 DIAGNOSIS — F41.9 ANXIETY AND DEPRESSION: Primary | ICD-10-CM

## 2021-04-15 DIAGNOSIS — C50.919 METASTATIC BREAST CANCER: ICD-10-CM

## 2021-04-15 DIAGNOSIS — D63.1 ANEMIA IN STAGE 3 CHRONIC KIDNEY DISEASE, UNSPECIFIED WHETHER STAGE 3A OR 3B CKD (HCC): ICD-10-CM

## 2021-04-15 DIAGNOSIS — Z79.4 CONTROLLED TYPE 2 DIABETES MELLITUS WITHOUT COMPLICATION, WITH LONG-TERM CURRENT USE OF INSULIN (HCC): ICD-10-CM

## 2021-04-15 DIAGNOSIS — E03.9 ACQUIRED HYPOTHYROIDISM: ICD-10-CM

## 2021-04-15 DIAGNOSIS — N18.32 STAGE 3B CHRONIC KIDNEY DISEASE (HCC): Primary | ICD-10-CM

## 2021-04-15 LAB
BASOPHILS # BLD AUTO: 0.07 10*3/MM3 (ref 0–0.2)
BASOPHILS NFR BLD AUTO: 2.2 % (ref 0–1.5)
DEPRECATED RDW RBC AUTO: 62.5 FL (ref 37–54)
EOSINOPHIL # BLD AUTO: 0.07 10*3/MM3 (ref 0–0.4)
EOSINOPHIL NFR BLD AUTO: 2.2 % (ref 0.3–6.2)
ERYTHROCYTE [DISTWIDTH] IN BLOOD BY AUTOMATED COUNT: 17.9 % (ref 12.3–15.4)
HCT VFR BLD AUTO: 27.7 % (ref 34–46.6)
HGB BLD-MCNC: 8.8 G/DL (ref 12–15.9)
IMM GRANULOCYTES # BLD AUTO: 0.04 10*3/MM3 (ref 0–0.05)
IMM GRANULOCYTES NFR BLD AUTO: 1.3 % (ref 0–0.5)
LYMPHOCYTES # BLD AUTO: 0.6 10*3/MM3 (ref 0.7–3.1)
LYMPHOCYTES NFR BLD AUTO: 19.2 % (ref 19.6–45.3)
MCH RBC QN AUTO: 32.2 PG (ref 26.6–33)
MCHC RBC AUTO-ENTMCNC: 31.8 G/DL (ref 31.5–35.7)
MCV RBC AUTO: 101.5 FL (ref 79–97)
MONOCYTES # BLD AUTO: 0.22 10*3/MM3 (ref 0.1–0.9)
MONOCYTES NFR BLD AUTO: 7 % (ref 5–12)
NEUTROPHILS NFR BLD AUTO: 2.13 10*3/MM3 (ref 1.7–7)
NEUTROPHILS NFR BLD AUTO: 68.1 % (ref 42.7–76)
NRBC BLD AUTO-RTO: 0.6 /100 WBC (ref 0–0.2)
PLATELET # BLD AUTO: 256 10*3/MM3 (ref 140–450)
PMV BLD AUTO: 10 FL (ref 6–12)
RBC # BLD AUTO: 2.73 10*6/MM3 (ref 3.77–5.28)
WBC # BLD AUTO: 3.13 10*3/MM3 (ref 3.4–10.8)

## 2021-04-15 PROCEDURE — 99214 OFFICE O/P EST MOD 30 MIN: CPT | Performed by: NURSE PRACTITIONER

## 2021-04-15 PROCEDURE — 36415 COLL VENOUS BLD VENIPUNCTURE: CPT

## 2021-04-15 PROCEDURE — 25010000002 EPOETIN ALFA PER 1000 UNITS: Performed by: INTERNAL MEDICINE

## 2021-04-15 PROCEDURE — 96372 THER/PROPH/DIAG INJ SC/IM: CPT

## 2021-04-15 PROCEDURE — 85025 COMPLETE CBC W/AUTO DIFF WBC: CPT

## 2021-04-15 RX ORDER — DULOXETIN HYDROCHLORIDE 20 MG/1
CAPSULE, DELAYED RELEASE ORAL
Qty: 30 CAPSULE | Refills: 0 | Status: SHIPPED | OUTPATIENT
Start: 2021-04-15 | End: 2021-05-11

## 2021-04-15 RX ORDER — ATORVASTATIN CALCIUM 40 MG/1
40 TABLET, FILM COATED ORAL DAILY
Qty: 90 TABLET | Refills: 1 | Status: SHIPPED | OUTPATIENT
Start: 2021-04-15 | End: 2021-08-03

## 2021-04-15 RX ORDER — PEN NEEDLE, DIABETIC 32GX 5/32"
1 NEEDLE, DISPOSABLE MISCELLANEOUS DAILY
Qty: 100 EACH | Refills: 3 | Status: SHIPPED | OUTPATIENT
Start: 2021-04-15 | End: 2021-08-03

## 2021-04-15 RX ORDER — FLUOXETINE 20 MG/1
20 TABLET, FILM COATED ORAL DAILY
Qty: 30 TABLET | Refills: 2 | Status: SHIPPED | OUTPATIENT
Start: 2021-04-15 | End: 2021-05-11

## 2021-04-15 RX ORDER — REPAGLINIDE 2 MG/1
2 TABLET ORAL
Qty: 270 TABLET | Refills: 1 | Status: CANCELLED | OUTPATIENT
Start: 2021-04-15

## 2021-04-15 RX ORDER — LEVOTHYROXINE SODIUM 0.05 MG/1
50 TABLET ORAL DAILY
Qty: 90 TABLET | Refills: 1 | Status: SHIPPED | OUTPATIENT
Start: 2021-04-15 | End: 2021-08-03

## 2021-04-15 RX ORDER — INSULIN DETEMIR 100 [IU]/ML
40 INJECTION, SOLUTION SUBCUTANEOUS 2 TIMES DAILY
Qty: 3 PEN | Refills: 5 | Status: SHIPPED | OUTPATIENT
Start: 2021-04-15 | End: 2021-08-03

## 2021-04-15 RX ADMIN — ERYTHROPOIETIN 50000 UNITS: 40000 INJECTION, SOLUTION INTRAVENOUS; SUBCUTANEOUS at 10:25

## 2021-04-15 NOTE — PROGRESS NOTES
"Subjective   Juana Hall is a 60 y.o. female.     History of Present Illness    Since the last visit, she has overall felt anxious.  She has Essential Hypertension and well controlled on current medication, DMII well controlled on medication and will continue regimen, Hyperlipidemia with goals met with current Rx and Hypothyroidism well controlled on current medication and will continue Rx.  she has been compliant with current medications have reviewed them.  The patient denies medication side effects.  Will refill medications. /60   Pulse 75   Temp 97.5 °F (36.4 °C)   Resp 16   Ht 170.2 cm (67.01\")   Wt (!) 167 kg (369 lb)   LMP  (LMP Unknown)   SpO2 96%   BMI 57.78 kg/m²     Results for orders placed or performed in visit on 04/15/21   CBC Auto Differential    Specimen: Blood   Result Value Ref Range    WBC 3.13 (L) 3.40 - 10.80 10*3/mm3    RBC 2.73 (L) 3.77 - 5.28 10*6/mm3    Hemoglobin 8.8 (L) 12.0 - 15.9 g/dL    Hematocrit 27.7 (L) 34.0 - 46.6 %    .5 (H) 79.0 - 97.0 fL    MCH 32.2 26.6 - 33.0 pg    MCHC 31.8 31.5 - 35.7 g/dL    RDW 17.9 (H) 12.3 - 15.4 %    RDW-SD 62.5 (H) 37.0 - 54.0 fl    MPV 10.0 6.0 - 12.0 fL    Platelets 256 140 - 450 10*3/mm3    Neutrophil % 68.1 42.7 - 76.0 %    Lymphocyte % 19.2 (L) 19.6 - 45.3 %    Monocyte % 7.0 5.0 - 12.0 %    Eosinophil % 2.2 0.3 - 6.2 %    Basophil % 2.2 (H) 0.0 - 1.5 %    Immature Grans % 1.3 (H) 0.0 - 0.5 %    Neutrophils, Absolute 2.13 1.70 - 7.00 10*3/mm3    Lymphocytes, Absolute 0.60 (L) 0.70 - 3.10 10*3/mm3    Monocytes, Absolute 0.22 0.10 - 0.90 10*3/mm3    Eosinophils, Absolute 0.07 0.00 - 0.40 10*3/mm3    Basophils, Absolute 0.07 0.00 - 0.20 10*3/mm3    Immature Grans, Absolute 0.04 0.00 - 0.05 10*3/mm3    nRBC 0.6 (H) 0.0 - 0.2 /100 WBC     She is taking duloxetine for anxiety but does not feel this has been helping. Her son Paras  in November.  She has been having a hard time coping with this as she is unable to keep herself " busy due to her chronic health conditions. She has been very fatigued and has no energy or desire to do anything.      The following portions of the patient's history were reviewed and updated as appropriate: allergies, current medications, past family history, past medical history, past social history, past surgical history and problem list.    Review of Systems   Constitutional: Positive for fatigue. Negative for unexpected weight change.   Respiratory: Negative for shortness of breath.    Cardiovascular: Negative for chest pain and palpitations.   Endocrine: Negative for cold intolerance, heat intolerance, polydipsia, polyphagia and polyuria.   Psychiatric/Behavioral: Positive for dysphoric mood and sleep disturbance. Negative for behavioral problems, self-injury and suicidal ideas.       Objective   Physical Exam  Vitals and nursing note reviewed.   Constitutional:       Appearance: Normal appearance. She is well-developed.   Neck:      Thyroid: No thyromegaly.      Vascular: No carotid bruit.   Cardiovascular:      Rate and Rhythm: Normal rate and regular rhythm.   Pulmonary:      Effort: Pulmonary effort is normal.      Breath sounds: Normal breath sounds.   Neurological:      Mental Status: She is alert and oriented to person, place, and time.   Psychiatric:         Mood and Affect: Mood normal.         Behavior: Behavior normal.         Thought Content: Thought content normal.         Judgment: Judgment normal.         Assessment/Plan   Diagnoses and all orders for this visit:    1. Anxiety and depression (Primary)  -     DULoxetine (CYMBALTA) 20 MG capsule; Take 2 caps daily x 1 week along with 1/2 tablet of fluoxetine, then take 1 cap daily for 1 week along with 1 tablet fluoxetine, then stop  Dispense: 30 capsule; Refill: 0  -     FLUoxetine (PROzac) 20 MG tablet; Take 1 tablet by mouth Daily for 90 days.  Dispense: 30 tablet; Refill: 2    2. Controlled type 2 diabetes mellitus without complication, with  long-term current use of insulin (CMS/[facility])  -     Levemir FlexTouch 100 UNIT/ML injection; Inject 40 Units under the skin into the appropriate area as directed 2 (Two) Times a Day.  Dispense: 3 pen; Refill: 5  -     Dulaglutide 1.5 MG/0.5ML solution pen-injector; Inject 1.5 mg under the skin into the appropriate area as directed 1 (One) Time Per Week. MONDAYS  Dispense: 12 pen; Refill: 1  -     Hemoglobin A1c; Future  -     Insulin Pen Needle (BD Pen Needle Gini U/F) 32G X 4 MM misc; 1 each Daily.  Dispense: 100 each; Refill: 3    3. Acquired hypothyroidism  -     levothyroxine (SYNTHROID, LEVOTHROID) 50 MCG tablet; Take 1 tablet by mouth Daily.  Dispense: 90 tablet; Refill: 1    4. Mixed hyperlipidemia  -     atorvastatin (LIPITOR) 40 MG tablet; Take 1 tablet by mouth Daily.  Dispense: 90 tablet; Refill: 1    Other orders  -     Cancel: repaglinide (PRANDIN) 2 MG tablet; Take 1 tablet by mouth 3 (Three) Times a Day Before Meals.  Dispense: 270 tablet; Refill: 1        Patient has creatinine of 3.35.  She is seeing nephrology for this. Due to her impaired kidney function, will take her off her Prandin and her duloxetine and add fluoxetine.      Taper instructions for weaning off duloxetine and starting fluoxetine were given to patient.        Will f/u in 4 weeks.   Will repeat A1c in 3 months.

## 2021-04-22 ENCOUNTER — INFUSION (OUTPATIENT)
Dept: ONCOLOGY | Facility: HOSPITAL | Age: 61
End: 2021-04-22

## 2021-04-22 ENCOUNTER — LAB (OUTPATIENT)
Dept: LAB | Facility: HOSPITAL | Age: 61
End: 2021-04-22

## 2021-04-22 ENCOUNTER — HOSPITAL ENCOUNTER (OUTPATIENT)
Dept: PET IMAGING | Facility: HOSPITAL | Age: 61
Discharge: HOME | End: 2021-04-22
Admitting: INTERNAL MEDICINE

## 2021-04-22 DIAGNOSIS — D63.1 ANEMIA IN STAGE 3 CHRONIC KIDNEY DISEASE, UNSPECIFIED WHETHER STAGE 3A OR 3B CKD (HCC): ICD-10-CM

## 2021-04-22 DIAGNOSIS — N18.32 STAGE 3B CHRONIC KIDNEY DISEASE (HCC): Primary | ICD-10-CM

## 2021-04-22 DIAGNOSIS — C50.919 METASTATIC BREAST CANCER: ICD-10-CM

## 2021-04-22 DIAGNOSIS — N18.30 ANEMIA IN STAGE 3 CHRONIC KIDNEY DISEASE, UNSPECIFIED WHETHER STAGE 3A OR 3B CKD (HCC): ICD-10-CM

## 2021-04-22 LAB
BASOPHILS # BLD AUTO: 0.05 10*3/MM3 (ref 0–0.2)
BASOPHILS NFR BLD AUTO: 1.1 % (ref 0–1.5)
DEPRECATED RDW RBC AUTO: 67.4 FL (ref 37–54)
EOSINOPHIL # BLD AUTO: 0.04 10*3/MM3 (ref 0–0.4)
EOSINOPHIL NFR BLD AUTO: 0.9 % (ref 0.3–6.2)
ERYTHROCYTE [DISTWIDTH] IN BLOOD BY AUTOMATED COUNT: 18.6 % (ref 12.3–15.4)
HCT VFR BLD AUTO: 26.5 % (ref 34–46.6)
HGB BLD-MCNC: 8.6 G/DL (ref 12–15.9)
IMM GRANULOCYTES # BLD AUTO: 0.04 10*3/MM3 (ref 0–0.05)
IMM GRANULOCYTES NFR BLD AUTO: 0.9 % (ref 0–0.5)
LYMPHOCYTES # BLD AUTO: 0.7 10*3/MM3 (ref 0.7–3.1)
LYMPHOCYTES NFR BLD AUTO: 15.1 % (ref 19.6–45.3)
MCH RBC QN AUTO: 32.8 PG (ref 26.6–33)
MCHC RBC AUTO-ENTMCNC: 32.5 G/DL (ref 31.5–35.7)
MCV RBC AUTO: 101.1 FL (ref 79–97)
MONOCYTES # BLD AUTO: 0.55 10*3/MM3 (ref 0.1–0.9)
MONOCYTES NFR BLD AUTO: 11.9 % (ref 5–12)
NEUTROPHILS NFR BLD AUTO: 3.26 10*3/MM3 (ref 1.7–7)
NEUTROPHILS NFR BLD AUTO: 70.1 % (ref 42.7–76)
NRBC BLD AUTO-RTO: 0 /100 WBC (ref 0–0.2)
PLATELET # BLD AUTO: 201 10*3/MM3 (ref 140–450)
PMV BLD AUTO: 11.1 FL (ref 6–12)
RBC # BLD AUTO: 2.62 10*6/MM3 (ref 3.77–5.28)
WBC # BLD AUTO: 4.64 10*3/MM3 (ref 3.4–10.8)

## 2021-04-22 PROCEDURE — 96372 THER/PROPH/DIAG INJ SC/IM: CPT

## 2021-04-22 PROCEDURE — 25010000002 EPOETIN ALFA PER 1000 UNITS: Performed by: INTERNAL MEDICINE

## 2021-04-22 PROCEDURE — 85025 COMPLETE CBC W/AUTO DIFF WBC: CPT

## 2021-04-22 PROCEDURE — 0 DIATRIZOATE MEGLUMINE & SODIUM PER 1 ML: Performed by: INTERNAL MEDICINE

## 2021-04-22 PROCEDURE — 36415 COLL VENOUS BLD VENIPUNCTURE: CPT

## 2021-04-22 PROCEDURE — 71250 CT THORAX DX C-: CPT

## 2021-04-22 PROCEDURE — 74176 CT ABD & PELVIS W/O CONTRAST: CPT

## 2021-04-22 RX ADMIN — ERYTHROPOIETIN 50000 UNITS: 40000 INJECTION, SOLUTION INTRAVENOUS; SUBCUTANEOUS at 14:31

## 2021-04-22 RX ADMIN — DIATRIZOATE MEGLUMINE AND DIATRIZOATE SODIUM 30 ML: 660; 100 LIQUID ORAL; RECTAL at 13:00

## 2021-04-27 ENCOUNTER — TELEPHONE (OUTPATIENT)
Dept: FAMILY MEDICINE CLINIC | Facility: CLINIC | Age: 61
End: 2021-04-27

## 2021-04-27 ENCOUNTER — OFFICE VISIT (OUTPATIENT)
Dept: FAMILY MEDICINE CLINIC | Facility: CLINIC | Age: 61
End: 2021-04-27

## 2021-04-27 VITALS
HEIGHT: 67 IN | SYSTOLIC BLOOD PRESSURE: 110 MMHG | TEMPERATURE: 97.5 F | DIASTOLIC BLOOD PRESSURE: 60 MMHG | OXYGEN SATURATION: 97 % | WEIGHT: 293 LBS | RESPIRATION RATE: 16 BRPM | BODY MASS INDEX: 45.99 KG/M2 | HEART RATE: 68 BPM

## 2021-04-27 DIAGNOSIS — N39.0 ACUTE UTI: Primary | ICD-10-CM

## 2021-04-27 LAB
BILIRUB BLD-MCNC: ABNORMAL MG/DL
CLARITY, POC: ABNORMAL
COLOR UR: ABNORMAL
GLUCOSE UR STRIP-MCNC: NEGATIVE MG/DL
KETONES UR QL: NEGATIVE
LEUKOCYTE EST, POC: ABNORMAL
NITRITE UR-MCNC: POSITIVE MG/ML
PH UR: 8.5 [PH] (ref 5–8)
PROT UR STRIP-MCNC: ABNORMAL MG/DL
RBC # UR STRIP: ABNORMAL /UL
SP GR UR: 1.01 (ref 1–1.03)
UROBILINOGEN UR QL: NORMAL

## 2021-04-27 PROCEDURE — 99213 OFFICE O/P EST LOW 20 MIN: CPT | Performed by: NURSE PRACTITIONER

## 2021-04-27 PROCEDURE — 81003 URINALYSIS AUTO W/O SCOPE: CPT | Performed by: NURSE PRACTITIONER

## 2021-04-27 RX ORDER — DOXYCYCLINE 100 MG/1
100 TABLET ORAL 2 TIMES DAILY
Qty: 14 TABLET | Refills: 0 | Status: SHIPPED | OUTPATIENT
Start: 2021-04-27 | End: 2021-05-04 | Stop reason: HOSPADM

## 2021-04-27 NOTE — PROGRESS NOTES
Subjective   Juana Hall is a 60 y.o. female.     History of Present Illness   Juana Hall 60 y.o.female complains of urinary symptoms. she complains of dysuria, urgency and foul smelling urine. She has had symptoms for a few days. The symptoms are marked.  Patient denies fever and gross blood in urine.  Patient has tried  nothing for relief of symptoms.  Patient does not have a history of recurrent UTI. Patient does not have a history of pyelonephritis.           The following portions of the patient's history were reviewed and updated as appropriate: allergies, current medications, past family history, past medical history, past social history, past surgical history and problem list.    Review of Systems   Constitutional: Negative for chills, fever and unexpected weight change.   Respiratory: Negative for shortness of breath.    Cardiovascular: Negative for chest pain and palpitations.   Genitourinary: Positive for difficulty urinating, dysuria, frequency, pelvic pain and urgency.   Musculoskeletal: Positive for back pain.   Psychiatric/Behavioral: Negative for behavioral problems.       Objective   Physical Exam  Vitals reviewed.   Constitutional:       Appearance: She is well-developed.   Cardiovascular:      Rate and Rhythm: Normal rate.   Pulmonary:      Effort: Pulmonary effort is normal.   Abdominal:      Tenderness: There is no right CVA tenderness or left CVA tenderness.   Neurological:      Mental Status: She is alert and oriented to person, place, and time.   Psychiatric:         Mood and Affect: Mood normal.         Behavior: Behavior normal.         Thought Content: Thought content normal.         Judgment: Judgment normal.         Assessment/Plan   Diagnoses and all orders for this visit:    1. Acute UTI (Primary)  -     Urine Culture - Urine, Urine, Clean Catch  -     doxycycline (ADOXA) 100 MG tablet; Take 1 tablet by mouth 2 (Two) Times a Day.  Dispense: 14 tablet; Refill: 0  -     POCT  urinalysis dipstick, automated      Repeat UA at f/u appt in 2 weeks.

## 2021-04-27 NOTE — TELEPHONE ENCOUNTER
Caller: Juana Hall    Relationship: Self    Best call back number: [phone] (M)    What are your current symptoms: ODOR IN URINE, BURNING SENSATION WHEN URINATING    How long have you been experiencing symptoms:04/25/21     Have you had these symptoms before:    [] Yes  [x] No    Have you been treated for these symptoms before:   [] Yes  [x] No    If a prescription is needed, what is your preferred pharmacy and phone number: [facility] [address] [place] [address] [address] [facility] [phone] [facility] [phone] FX     Additional notes: PATIENT ALSO STATES WAS TAKEN OFF OF MEDICATION repaglinide (PRANDIN) 2 MG tablet PATIENT STATES BLOOD SUGAR HAS BEEN HIGH AROUND 200

## 2021-04-27 NOTE — TELEPHONE ENCOUNTER
Pt coming in for urinary symptoms today.  Pt also states her blood sugar has been running high.  Please advise if you need her to do anything before you see her.  Thanks

## 2021-04-28 NOTE — PROGRESS NOTES
"Chief Complaint  Metastatic lobular breast cancer (ER/[place] positive, HER-2/tj negative), anemia secondary to CKD3, CHF, peripheral neuropathy, chemotherapy related nausea chemotherapy-induced myelosuppression    Subjective        History of Present Illness  The patient returns today in follow-up with laboratory studies and CT scans to review, continuing on Aromasin 25 mg daily and Ibrance 100 mg daily for 21/28 days as well as weekly Procrit.  The patient started a new cycle of Ibrance on 4/26/2021.  The patient has required escalating dosing of Procrit weekly, currently up to 50,000 units.  She had a decline in hemoglobin down to 7.7 on 4/1/2021 and was symptomatic from her anemia and received 1 unit PRBC transfusion.  She notes that she had been doing reasonably well however on 4/25/2021 developed increasing fatigue as well as dysuria, urinary frequency.  She denies any fevers.  She was seen by her PCP on 4/27/2021 and had urinalysis that was suspicious for UTI and was started on empiric doxycycline.  Urine culture ultimately returned negative for any significant growth.    Today, the patient reports that her urinary symptoms have improved slightly since she has been on the antibiotic.  She notes that she has continued with significant fatigue.  She has been weak and fell earlier this week with no specific injury but has had a generalized \"achiness\" since then.  She has had trouble with mobility and is in a motorized scooter today.  She has had no change in her mild chronic dyspnea on exertion.  She denies any cough.  Overall she feels very fatigued.  She does note slightly diminished oral intake.      Objective   Vital Signs:   BP 90/57   Pulse 64   Temp 97.1 °F (36.2 °C) (Temporal)   Resp 18   Ht 170.2 cm (67.01\")   SpO2 99%   BMI 57.78 kg/m²     Physical Exam  Constitutional:       Appearance: She is well-developed. She is obese.      Comments: Patient appears pale, weak, is in a motorized scooter today. "   Eyes:      Conjunctiva/sclera: Conjunctivae normal.   Neck:      Thyroid: No thyromegaly.   Cardiovascular:      Rate and Rhythm: Normal rate and regular rhythm.      Heart sounds: Murmur heard.   No friction rub. No gallop.       Comments: 2/6 murmur  Pulmonary:      Effort: No respiratory distress.      Breath sounds: Normal breath sounds.   Abdominal:      General: Bowel sounds are normal. There is no distension.      Palpations: Abdomen is soft.      Tenderness: There is no abdominal tenderness.   Musculoskeletal:         General: Swelling present.      Comments: Trace to 1+ bilateral lower extremity edema   Lymphadenopathy:      Head:      Right side of head: No submandibular adenopathy.      Cervical: No cervical adenopathy.      Upper Body:      Right upper body: No supraclavicular adenopathy.      Left upper body: No supraclavicular adenopathy.   Skin:     General: Skin is warm and dry.      Findings: No bruising or rash.   Neurological:      Mental Status: She is alert and oriented to person, place, and time.      Cranial Nerves: No cranial nerve deficit.      Motor: No abnormal muscle tone.      Deep Tendon Reflexes: Reflexes normal.   Psychiatric:         Behavior: Behavior normal.        Result Review : Reviewed CBC, CMP from today.  Reviewed urinalysis and urine culture from 4/27/2021.  Reviewed CT scans 4/22/2021.  I did personally review CT images with interpretation as outlined below in the assessment.       Assessment and Plan    1. Metastatic lobular breast cancer (ER/DE positive, HER-2/tj negative):  · History of stage IIIC right breast cancer ([id number])  · Patient treated previously in the [facility] system  · Diagnosis via excisional biopsy 8/23/2003 with lobular carcinoma, 4.5 cm, positive margins.  ER/DE positive, HER-2/tj negative.  · Staging evaluation in September 2003 with negative bone scan and negative CT scans.  Ejection fraction 65%.  · Received neoadjuvant chemotherapy (? GET  regimen) which apparently included Taxotere and possibly epirubicin.  Received 6 cycles.  · 1/22/2004 bilateral mastectomy with right axillary dissection.  Per available records, 10-12 cm residual invasive lobular carcinoma in the breast with 14/16 lymph nodes positive with extranodal extension.  Immediate reconstruction.  · Received adjuvant chemotherapy with carboplatin and navelbine x4 cycles  · Initiated adjuvant tamoxifen 6/22/2004  · Adjuvant radiation therapy initiated but interrupted due to chest wall cellulitis requiring removal of implants in August 2004  · Implant reconstruction again attempted with MRSA infection, implant removed 12/30/2005 with no further attempts made and reconstruction.  · Completed 5 years tamoxifen in June 2009 and subsequently transitioned to Femara.  Received Femara until June 2014.  · Patient experienced postmenopausal vaginal bleeding in 2013, negative endometrial biopsy and gynecologic evaluation.  · Patient last seen in [facility] 6/18/2014  · CT chest performed to evaluate bicuspid aortic valve and dilated asending aorta 7/12/2019.  CT showed no change in aorta however showed new free intraperitoneal fluid in the upper abdomen with mesenteric edema, concerning for carcinomatosis.  · CT abdomen and pelvis (without IV contrast) on 7/16/2019 with mesenteric thickening, small volume of complex fluid in the mesentery which was suspicious and possibly representative of carcinomatosis, small amount of perihepatic fluid, small bowel loops tethered to omentum without obstruction, omental thickening, shotty retroperitoneal and pelvic lymph nodes (non-pathologically enlarged).  · PET scan 7/26/2019 with hypermetabolic omental activity, hypermetabolic small retroperitoneal lymph nodes, hypermetabolic activity throughout uterus with increase in size.  · CT-guided omental biopsy 7/30/2019 with metastatic lobular carcinoma of breast primary, ER positive (greater than 95%), [place] positive  (90%), HER-2/tj negative (1+ IHC)  · Baseline CA 15-3 on 7/22/19 was 32.6  · Initiation of Aromasin 25 mg daily 8/12/2019.  Initiated Ibrance at 100 mg 21/28 days on 8/26/2019.  · Improvement in CA 15-3 on 9/17/19-26.3  · Following 2 cycles of Aromasin and Ibrance, CA 15-3 declined to 25.9 on 10/15/2019.  CT scan chest abdomen pelvis 10/16/2019 showed improvement in the mesenteric and omental thickening and near resolution of complex ascites.  Treatment continued.  · Stable findings on subsequent CT chest abdomen pelvis 12/11/2019.  Further improvement in CA 15-3-23.9 on 12/18/2019.  Ibrance/Aromasin continued.   · Foundation medicine liquid biopsy 2/5/2020: MSI undetermined, mutations in BETH, NF1, CHEK2.  No evidence of PIK3CA mutation.  · Slight increase in CA 15-3 to 27.1 on 2/19/2020 and 29.1 on 3/18/2020.  CT however on 3/13/2020 with no new findings.  · Subsequent improvement in CA 15-3-26.5 on 5/15/2020  · CT 7/31/2020 showed evidence of stable disease.  CA 15-3 declined further to 23.1 on 7/24/2020.  · CT 10/19/2020 with stable disease.  Fluctuations in CA 15-3 of unclear significance.  · CA 15-3 on 1/6/2021 decreased to 23.  CT chest abdomen pelvis 1/29/2021 with stable findings.  · Patient returns today in follow-up continuing on Aromasin 25 mg daily, Ibrance 100 mg daily for 21/28 days (started current cycle Ibrance on 4/26/2021).  Patient has laboratory studies and CT scans to review today, scans being performed at 3-month interval.  The patient is currently being treated for UTI (see below) and has been experiencing increased fatigue that worsened fairly acutely on 4/25/2021.  We reviewed the patient's scan findings in regards to her breast cancer.  Fortunately, the scan shows no significant evidence of progressive disease.  The patient is more anemic related to her renal dysfunction today and has developed RYAN with creatinine acutely increased from 3.35 on 4/1/2021 up to 6.98 today with BUN 78.  I did  contact Dr. Melgoza of [facility] who has kindly agreed to admit the patient today.  In regards to her breast cancer, we would continue her Aromasin for now however we will place her Ibrance on hold until her acute issues have resolved during the hospitalization.  She did just recently begin her current cycle of Ibrance on 4/26/2021 (100 mg daily for 21/28 days).  I would anticipate a 3-month interval follow-up CT as she continues on her treatment following discharge from the hospital and we will need to make those arrangements depending on timeframe for discharge.  2. Anemia secondary to CKD3/4:  · The patient appears to have a chronic anemia with hemoglobin in the 10-11 range with normal MCV.  · Patient has underlying CKD3 with baseline creatinine recently in the 1.5-1.8 range.  · Anemia evaluation 7/22/2019 with iron 30, ferritin 85.2, iron saturation 10%, TIBC 311, B12 370, erythropoietin level 20.4  · Anemia felt in large part to be related to CKD3 as well as to underlying malignancy  · Evidence of folate deficiency 8/12/2019 with level 3.84, initiated folic acid 1 mg daily  · Additional labs on 12/18/2019 with iron 73, ferritin 82.2, iron saturation 25%, TIBC 290.  We did discuss the potential use of Procrit if her hemoglobin declines into the low 9/upper 8 range.  · Decline in hemoglobin into the 8 range, iron studies 7/20/2020 with iron 54, ferritin 66.7, iron saturation 16%, TIBC 328.  On 8/7/2020 proceeded with Injectafer 750 mg IV x1 dose.  Second dose not administered due to onset of fever, subsequent iron studies precluded further administration of IV iron.  · Initiated Procrit 20,000 units every 4 weeks on 9/4/2020.  · On 1/6/2021, hemoglobin declined significantly to 7.1.  This was repeated and verified at 7.2.  No evidence of GI blood loss, stool cards negative for occult blood x3 on 1/12/2021.  Additional labs on 1/6/2021 with iron 47, ferritin 430, iron saturation 20%, TIBC 235, folate greater than 20,  B12 324, haptoglobin 525, .  · Transfused 2 unit PRBC on 1/7/2021  · Change in Procrit dosing to weekly  · Due to low normal B12 level initiated oral B12 1000 mcg daily.  · Patient with symptomatic decline in hemoglobin to 7.7 on 4/1/2021, received 1 unit PRBC transfusion  · Patient returns today having required ongoing escalation of her Procrit dose, currently receiving 50,000 units weekly.  She has experienced dramatic rise in her creatinine up to 6.98 today with decline in her hemoglobin to 7.9.  We originally had discussed outpatient transfusion before her creatinine was available and before we knew that she would require hospital admission today.  She did receive Procrit today with an increase up to 60,000 units.  We will need to monitor her hemoglobin and consider transfusion during hospitalization.  We will need to arrange ongoing outpatient Procrit when she is discharged.  3. RYAN/CKD3/4:  · Baseline creatinine recently in 1.6-2.0 range  · On 9/10/2019, RYAN/CKD3 with creatinine up to 2.44, BUN of 40.  Received 1 L normal saline.  Patient with increase in oral fluid intake.  · Renal ultrasound 9/20/2019 with no evidence of obstructive uropathy.  · Diminished oral intake due to nausea from Ibrance, creatinine 2.79 with BUN 43 on 10/15/2019.  Received 1 L normal saline.  Repeat labs on 10/18/2019 with BUN 31, creatinine 2.0.  · During cycle 3 Ibrance, patient developed increase in creatinine on 11/6/2019 to 2.35 and received 1 L normal saline.  · Patient is trying to maintain adequate oral hydration.    · Gradual escalation of creatinine into the 2-2.4 range and subsequently into the 2.5-2.8 range  · With worsening anemia, creatinine increased into the low 3 range in early January 2021.  Patient increased oral hydration with improvement back into the mid 2 range.  · The patient returns today in follow-up with recent development of presumed UTI with reduction in her oral intake, increase in fatigue and  evidence of RYAN with significant increase in creatinine from 3.35 on 4/1/2021 up to 6.98 today with BUN of 78.  Fortunately, her potassium is normal at 4.3.  She has evidence of acidosis, CO2 15.2.  She is not volume overloaded currently and in fact may be somewhat volume depleted with a component of prerenal azotemia.  As above I did contact Dr. Melgoza who has kindly accepted her for admission at the hospital.  We will need to consult her nephrologist Dr. Antonio.  We did initiate IV fluids in the office today prior to the patient going to the hospital.  4. CHF with mild to moderate aortic stenosis:  · Recent cardiology evaluation with echocardiogram 7/12/2019 showing ejection fraction 48% with moderate dilation of the LV cavity, global hypokinesis, grade 2 diastolic dysfunction, mild to moderate aortic stenosis, trivial pericardial effusion.  · CT chest 7/12/2019 with no change in the aorta compared to prior study 12/13/2017.  5. Left upper and bilateral lower extremity peripheral neuropathy:  · Patient reports that left upper extremity neuropathy was not present from previous chemotherapy but occurred after hospitalization that required intubation and critical care stay.  Apparently felt to represent critical care neuropathy.  Improved over time.  · Bilateral lower extremity neuropathy felt to be related to diabetes  · May have future implications regarding treatment for breast cancer.  6. Uterine/endometrial activity on PET scan:  · Patient experienced postmenopausal vaginal bleeding in 2013, negative endometrial biopsy and gynecologic evaluation.  · With findings on PET scan with enlarging uterus and some hypermetabolic activity, referred back to Dr. Oliveros and gynecology.    · 9/19/2019 D&C with hysteroscopy by Dr. Oliveros, no significant intraoperative findings, pathologic results with benign findings, no evidence of malignancy.  7. Nausea:  · Mild, relieved with Zofran.    8. Myelosuppression secondary to  Ibrance:  · With cycle 1, jessica WBC 2.49 with ANC 1.25 and platelet count 140,000.  · With cycle 2, jessica WBC 2.33 with ANC 1.06, maintained normal platelet count  · Today, WBC   9.0  9. Hyperkalemia  · Patient with borderline elevated potassium in the past  · Today, potassium 4.3  10. Depression  · When seen on 3/4/2021, patient indicated increasing symptoms of depression.  She did not wish to discuss this further at the time.  She indicated that she was coping adequately.  She was offered referral to the supportive oncology clinic however declined this.  11. UTI  · Patient developed onset of fatigue, dysuria, urinary frequency around 4/25/2021  · Patient was seen by PCP on 4/27/2021 with urinalysis suggestive of UTI was started on doxycycline empirically.  Urine culture ultimately with no significant growth.  · Patient today does note improvement in her urinary symptoms since being on doxycycline.         Plan:  1. The patient did receive Procrit 60,000 units in the CBC office today prior to hospital admission  2. Patient is being admitted by Dr. Melgoza of [facility] today due to RYAN/CKD3/4  3. Would recommend to continue Aromasin 25 mg daily during admission however for now we will hold Ibrance until acute issues have improved  4. The patient will also need to continue oral folic acid 1 mg daily, B12 1000 mcg daily.  5. We will need to monitor hemoglobin during admission and consider need for possible transfusion support if hemoglobin declines further  6. We will need to schedule further outpatient follow-up once patient is ready for hospital discharge in terms of resuming weekly Procrit and ongoing management of metastatic breast cancer with Aromasin/Ibrance.     Patient continues on high risk medication requiring intensive monitoring.    I did discuss the patient with Dr. Melgoza on the telephone today.

## 2021-04-29 ENCOUNTER — INFUSION (OUTPATIENT)
Dept: ONCOLOGY | Facility: HOSPITAL | Age: 61
End: 2021-04-29

## 2021-04-29 ENCOUNTER — OFFICE VISIT (OUTPATIENT)
Dept: ONCOLOGY | Facility: CLINIC | Age: 61
End: 2021-04-29

## 2021-04-29 ENCOUNTER — HOSPITAL ENCOUNTER (INPATIENT)
Facility: HOSPITAL | Age: 61
LOS: 5 days | Discharge: HOME | End: 2021-05-04
Attending: HOSPITALIST | Admitting: HOSPITALIST

## 2021-04-29 ENCOUNTER — APPOINTMENT (OUTPATIENT)
Dept: ULTRASOUND IMAGING | Facility: HOSPITAL | Age: 61
End: 2021-04-29

## 2021-04-29 ENCOUNTER — HOSPITAL ENCOUNTER (INPATIENT)
Facility: HOSPITAL | Age: 61
End: 2021-04-29
Attending: HOSPITALIST | Admitting: HOSPITALIST

## 2021-04-29 ENCOUNTER — LAB (OUTPATIENT)
Dept: LAB | Facility: HOSPITAL | Age: 61
End: 2021-04-29

## 2021-04-29 VITALS
SYSTOLIC BLOOD PRESSURE: 90 MMHG | TEMPERATURE: 97.1 F | HEIGHT: 67 IN | DIASTOLIC BLOOD PRESSURE: 57 MMHG | HEART RATE: 64 BPM | RESPIRATION RATE: 18 BRPM | OXYGEN SATURATION: 99 % | BODY MASS INDEX: 57.78 KG/M2

## 2021-04-29 DIAGNOSIS — D63.1 ANEMIA IN STAGE 3 CHRONIC KIDNEY DISEASE, UNSPECIFIED WHETHER STAGE 3A OR 3B CKD (HCC): ICD-10-CM

## 2021-04-29 DIAGNOSIS — N18.30 ANEMIA IN STAGE 3 CHRONIC KIDNEY DISEASE, UNSPECIFIED WHETHER STAGE 3A OR 3B CKD (HCC): Primary | ICD-10-CM

## 2021-04-29 DIAGNOSIS — C50.919 METASTATIC BREAST CANCER: ICD-10-CM

## 2021-04-29 DIAGNOSIS — D63.1 ANEMIA IN STAGE 3 CHRONIC KIDNEY DISEASE, UNSPECIFIED WHETHER STAGE 3A OR 3B CKD (HCC): Primary | ICD-10-CM

## 2021-04-29 DIAGNOSIS — N18.30 ANEMIA IN STAGE 3 CHRONIC KIDNEY DISEASE, UNSPECIFIED WHETHER STAGE 3A OR 3B CKD (HCC): ICD-10-CM

## 2021-04-29 DIAGNOSIS — N18.32 STAGE 3B CHRONIC KIDNEY DISEASE (HCC): Primary | ICD-10-CM

## 2021-04-29 PROBLEM — I50.32 DIASTOLIC CHF, CHRONIC: Status: ACTIVE | Noted: 2021-04-29

## 2021-04-29 PROBLEM — E87.20 METABOLIC ACIDOSIS: Status: ACTIVE | Noted: 2021-04-29

## 2021-04-29 PROBLEM — D63.8 ANEMIA, CHRONIC DISEASE: Status: ACTIVE | Noted: 2021-04-29

## 2021-04-29 PROBLEM — N17.9 AKI (ACUTE KIDNEY INJURY): Status: ACTIVE | Noted: 2021-04-29

## 2021-04-29 LAB
ABO GROUP BLD: NORMAL
ALBUMIN SERPL-MCNC: 2.5 G/DL (ref 3.5–5.2)
ALBUMIN SERPL-MCNC: 2.6 G/DL (ref 3.5–5.2)
ALBUMIN/GLOB SERPL: 0.6 G/DL
ALBUMIN/GLOB SERPL: 0.6 G/DL (ref 1.1–2.4)
ALP SERPL-CCNC: 152 U/L (ref 39–117)
ALP SERPL-CCNC: 175 U/L (ref 38–116)
ALT SERPL W P-5'-P-CCNC: 15 U/L (ref 0–33)
ALT SERPL W P-5'-P-CCNC: 18 U/L (ref 1–33)
ANION GAP SERPL CALCULATED.3IONS-SCNC: 12.3 MMOL/L (ref 5–15)
ANION GAP SERPL CALCULATED.3IONS-SCNC: 16.8 MMOL/L (ref 5–15)
ANISOCYTOSIS BLD QL: ABNORMAL
AST SERPL-CCNC: 23 U/L (ref 1–32)
AST SERPL-CCNC: 24 U/L (ref 0–32)
BACTERIA UR CULT: NORMAL
BACTERIA UR CULT: NORMAL
BASOPHILS # BLD AUTO: 0.1 10*3/MM3 (ref 0–0.2)
BASOPHILS NFR BLD AUTO: 1.1 % (ref 0–1.5)
BILIRUB SERPL-MCNC: 0.4 MG/DL (ref 0–1.2)
BILIRUB SERPL-MCNC: 0.5 MG/DL (ref 0.2–1.2)
BLD GP AB SCN SERPL QL: NEGATIVE
BUN SERPL-MCNC: 76 MG/DL (ref 8–23)
BUN SERPL-MCNC: 78 MG/DL (ref 6–20)
BUN/CREAT SERPL: 11.1 (ref 7–25)
BUN/CREAT SERPL: 11.2 (ref 7.3–30)
BURR CELLS BLD QL SMEAR: ABNORMAL
CALCIUM SPEC-SCNC: 8 MG/DL (ref 8.6–10.5)
CALCIUM SPEC-SCNC: 8.4 MG/DL (ref 8.5–10.2)
CHLORIDE SERPL-SCNC: 100 MMOL/L (ref 98–107)
CHLORIDE SERPL-SCNC: 104 MMOL/L (ref 98–107)
CO2 SERPL-SCNC: 15.2 MMOL/L (ref 22–29)
CO2 SERPL-SCNC: 15.7 MMOL/L (ref 22–29)
CREAT SERPL-MCNC: 6.83 MG/DL (ref 0.57–1)
CREAT SERPL-MCNC: 6.98 MG/DL (ref 0.6–1.1)
CREAT UR-MCNC: 95.2 MG/DL
DEPRECATED RDW RBC AUTO: 61.9 FL (ref 37–54)
DEPRECATED RDW RBC AUTO: 69.1 FL (ref 37–54)
EOSINOPHIL # BLD AUTO: 0.08 10*3/MM3 (ref 0–0.4)
EOSINOPHIL NFR BLD AUTO: 0.9 % (ref 0.3–6.2)
EOSINOPHIL SPEC QL MICRO: 0 % EOS/100 CELLS (ref 0–0)
ERYTHROCYTE [DISTWIDTH] IN BLOOD BY AUTOMATED COUNT: 17.8 % (ref 12.3–15.4)
ERYTHROCYTE [DISTWIDTH] IN BLOOD BY AUTOMATED COUNT: 18.6 % (ref 12.3–15.4)
GFR SERPL CREATININE-BSD FRML MDRD: 6 ML/MIN/1.73
GFR SERPL CREATININE-BSD FRML MDRD: 6 ML/MIN/1.73
GFR SERPL CREATININE-BSD FRML MDRD: ABNORMAL ML/MIN/{1.73_M2}
GFR SERPL CREATININE-BSD FRML MDRD: ABNORMAL ML/MIN/{1.73_M2}
GLOBULIN UR ELPH-MCNC: 4.1 GM/DL
GLOBULIN UR ELPH-MCNC: 4.5 GM/DL (ref 1.8–3.5)
GLUCOSE BLDC GLUCOMTR-MCNC: 151 MG/DL (ref 70–130)
GLUCOSE BLDC GLUCOMTR-MCNC: 213 MG/DL (ref 70–130)
GLUCOSE SERPL-MCNC: 136 MG/DL (ref 74–124)
GLUCOSE SERPL-MCNC: 149 MG/DL (ref 65–99)
HBA1C MFR BLD: 6.43 % (ref 4.8–5.6)
HCT VFR BLD AUTO: 22.7 % (ref 34–46.6)
HCT VFR BLD AUTO: 25.4 % (ref 34–46.6)
HGB BLD-MCNC: 7.3 G/DL (ref 12–15.9)
HGB BLD-MCNC: 7.9 G/DL (ref 12–15.9)
IMM GRANULOCYTES # BLD AUTO: 0.11 10*3/MM3 (ref 0–0.05)
IMM GRANULOCYTES NFR BLD AUTO: 1.2 % (ref 0–0.5)
LYMPHOCYTES # BLD AUTO: 0.58 10*3/MM3 (ref 0.7–3.1)
LYMPHOCYTES # BLD MANUAL: 0.7 10*3/MM3 (ref 0.7–3.1)
LYMPHOCYTES NFR BLD AUTO: 6.4 % (ref 19.6–45.3)
LYMPHOCYTES NFR BLD MANUAL: 3 % (ref 5–12)
LYMPHOCYTES NFR BLD MANUAL: 8 % (ref 19.6–45.3)
MAGNESIUM SERPL-MCNC: 1.4 MG/DL (ref 1.6–2.4)
MCH RBC QN AUTO: 31.3 PG (ref 26.6–33)
MCH RBC QN AUTO: 31.6 PG (ref 26.6–33)
MCHC RBC AUTO-ENTMCNC: 31.1 G/DL (ref 31.5–35.7)
MCHC RBC AUTO-ENTMCNC: 32.2 G/DL (ref 31.5–35.7)
MCV RBC AUTO: 101.6 FL (ref 79–97)
MCV RBC AUTO: 97.4 FL (ref 79–97)
MONOCYTES # BLD AUTO: 0.26 10*3/MM3 (ref 0.1–0.9)
MONOCYTES # BLD AUTO: 0.44 10*3/MM3 (ref 0.1–0.9)
MONOCYTES NFR BLD AUTO: 4.9 % (ref 5–12)
NEUTROPHILS # BLD AUTO: 7.76 10*3/MM3 (ref 1.7–7)
NEUTROPHILS NFR BLD AUTO: 7.69 10*3/MM3 (ref 1.7–7)
NEUTROPHILS NFR BLD AUTO: 85.5 % (ref 42.7–76)
NEUTROPHILS NFR BLD MANUAL: 89 % (ref 42.7–76)
NRBC BLD AUTO-RTO: 0.2 /100 WBC (ref 0–0.2)
OSMOLALITY UR: 313 MOSM/KG
OVALOCYTES BLD QL SMEAR: ABNORMAL
PHOSPHATE SERPL-MCNC: 5.2 MG/DL (ref 2.5–4.5)
PLAT MORPH BLD: NORMAL
PLATELET # BLD AUTO: 237 10*3/MM3 (ref 140–450)
PLATELET # BLD AUTO: 318 10*3/MM3 (ref 140–450)
PMV BLD AUTO: 10.2 FL (ref 6–12)
PMV BLD AUTO: 10.5 FL (ref 6–12)
POIKILOCYTOSIS BLD QL SMEAR: ABNORMAL
POLYCHROMASIA BLD QL SMEAR: ABNORMAL
POTASSIUM SERPL-SCNC: 4.3 MMOL/L (ref 3.5–4.7)
POTASSIUM SERPL-SCNC: 4.4 MMOL/L (ref 3.5–5.2)
PROT SERPL-MCNC: 6.7 G/DL (ref 6–8.5)
PROT SERPL-MCNC: 7 G/DL (ref 6.3–8)
RBC # BLD AUTO: 2.33 10*6/MM3 (ref 3.77–5.28)
RBC # BLD AUTO: 2.5 10*6/MM3 (ref 3.77–5.28)
RH BLD: POSITIVE
SARS-COV-2 RNA RESP QL NAA+PROBE: NOT DETECTED
SODIUM SERPL-SCNC: 132 MMOL/L (ref 134–145)
SODIUM SERPL-SCNC: 132 MMOL/L (ref 136–145)
SODIUM UR-SCNC: 72 MMOL/L
T&S EXPIRATION DATE: NORMAL
T4 FREE SERPL-MCNC: 1.26 NG/DL (ref 0.93–1.7)
TSH SERPL DL<=0.05 MIU/L-ACNC: 5.07 UIU/ML (ref 0.27–4.2)
URATE SERPL-MCNC: 12.3 MG/DL (ref 2.4–5.7)
UUN 24H UR-MCNC: 234 MG/DL
WBC # BLD AUTO: 8.72 10*3/MM3 (ref 3.4–10.8)
WBC # BLD AUTO: 9 10*3/MM3 (ref 3.4–10.8)
WBC MORPH BLD: NORMAL

## 2021-04-29 PROCEDURE — 83735 ASSAY OF MAGNESIUM: CPT | Performed by: HOSPITALIST

## 2021-04-29 PROCEDURE — 87205 SMEAR GRAM STAIN: CPT | Performed by: HOSPITALIST

## 2021-04-29 PROCEDURE — P9016 RBC LEUKOCYTES REDUCED: HCPCS

## 2021-04-29 PROCEDURE — 80053 COMPREHEN METABOLIC PANEL: CPT | Performed by: HOSPITALIST

## 2021-04-29 PROCEDURE — U0003 INFECTIOUS AGENT DETECTION BY NUCLEIC ACID (DNA OR RNA); SEVERE ACUTE RESPIRATORY SYNDROME CORONAVIRUS 2 (SARS-COV-2) (CORONAVIRUS DISEASE [COVID-19]), AMPLIFIED PROBE TECHNIQUE, MAKING USE OF HIGH THROUGHPUT TECHNOLOGIES AS DESCRIBED BY CMS-2020-01-R: HCPCS | Performed by: HOSPITALIST

## 2021-04-29 PROCEDURE — 76775 US EXAM ABDO BACK WALL LIM: CPT

## 2021-04-29 PROCEDURE — 25010000002 EPOETIN ALFA PER 1000 UNITS: Performed by: INTERNAL MEDICINE

## 2021-04-29 PROCEDURE — 82962 GLUCOSE BLOOD TEST: CPT

## 2021-04-29 PROCEDURE — 86900 BLOOD TYPING SEROLOGIC ABO: CPT | Performed by: INTERNAL MEDICINE

## 2021-04-29 PROCEDURE — 36415 COLL VENOUS BLD VENIPUNCTURE: CPT

## 2021-04-29 PROCEDURE — 84439 ASSAY OF FREE THYROXINE: CPT | Performed by: HOSPITALIST

## 2021-04-29 PROCEDURE — 80053 COMPREHEN METABOLIC PANEL: CPT

## 2021-04-29 PROCEDURE — 84540 ASSAY OF URINE/UREA-N: CPT | Performed by: HOSPITALIST

## 2021-04-29 PROCEDURE — 86923 COMPATIBILITY TEST ELECTRIC: CPT

## 2021-04-29 PROCEDURE — 96360 HYDRATION IV INFUSION INIT: CPT

## 2021-04-29 PROCEDURE — 85025 COMPLETE CBC W/AUTO DIFF WBC: CPT | Performed by: HOSPITALIST

## 2021-04-29 PROCEDURE — 85025 COMPLETE CBC W/AUTO DIFF WBC: CPT

## 2021-04-29 PROCEDURE — 86901 BLOOD TYPING SEROLOGIC RH(D): CPT | Performed by: INTERNAL MEDICINE

## 2021-04-29 PROCEDURE — 86900 BLOOD TYPING SEROLOGIC ABO: CPT

## 2021-04-29 PROCEDURE — 99215 OFFICE O/P EST HI 40 MIN: CPT | Performed by: INTERNAL MEDICINE

## 2021-04-29 PROCEDURE — 36430 TRANSFUSION BLD/BLD COMPNT: CPT

## 2021-04-29 PROCEDURE — 84300 ASSAY OF URINE SODIUM: CPT | Performed by: HOSPITALIST

## 2021-04-29 PROCEDURE — 63710000001 INSULIN GLARGINE PER 5 UNITS: Performed by: HOSPITALIST

## 2021-04-29 PROCEDURE — 83935 ASSAY OF URINE OSMOLALITY: CPT | Performed by: HOSPITALIST

## 2021-04-29 PROCEDURE — 83036 HEMOGLOBIN GLYCOSYLATED A1C: CPT | Performed by: HOSPITALIST

## 2021-04-29 PROCEDURE — 84100 ASSAY OF PHOSPHORUS: CPT | Performed by: HOSPITALIST

## 2021-04-29 PROCEDURE — 85007 BL SMEAR W/DIFF WBC COUNT: CPT | Performed by: HOSPITALIST

## 2021-04-29 PROCEDURE — 86850 RBC ANTIBODY SCREEN: CPT | Performed by: INTERNAL MEDICINE

## 2021-04-29 PROCEDURE — 84550 ASSAY OF BLOOD/URIC ACID: CPT | Performed by: HOSPITALIST

## 2021-04-29 PROCEDURE — 84443 ASSAY THYROID STIM HORMONE: CPT | Performed by: HOSPITALIST

## 2021-04-29 PROCEDURE — 82570 ASSAY OF URINE CREATININE: CPT | Performed by: HOSPITALIST

## 2021-04-29 PROCEDURE — 25010000002 MAGNESIUM SULFATE IN D5W 1G/100ML (PREMIX) 1-5 GM/100ML-% SOLUTION: Performed by: HOSPITALIST

## 2021-04-29 PROCEDURE — 96372 THER/PROPH/DIAG INJ SC/IM: CPT

## 2021-04-29 RX ORDER — FUROSEMIDE 10 MG/ML
20 INJECTION INTRAMUSCULAR; INTRAVENOUS ONCE
Status: CANCELLED | OUTPATIENT
Start: 2021-04-29 | End: 2021-04-29

## 2021-04-29 RX ORDER — ACETAMINOPHEN 325 MG/1
650 TABLET ORAL EVERY 4 HOURS PRN
Status: DISCONTINUED | OUTPATIENT
Start: 2021-04-29 | End: 2021-05-04 | Stop reason: HOSPADM

## 2021-04-29 RX ORDER — SODIUM CHLORIDE 0.9 % (FLUSH) 0.9 %
10 SYRINGE (ML) INJECTION EVERY 12 HOURS SCHEDULED
Status: DISCONTINUED | OUTPATIENT
Start: 2021-04-29 | End: 2021-05-04 | Stop reason: HOSPADM

## 2021-04-29 RX ORDER — ACETAMINOPHEN 160 MG/5ML
650 SOLUTION ORAL EVERY 4 HOURS PRN
Status: DISCONTINUED | OUTPATIENT
Start: 2021-04-29 | End: 2021-05-04 | Stop reason: HOSPADM

## 2021-04-29 RX ORDER — ACETAMINOPHEN 325 MG/1
325 TABLET ORAL ONCE
Status: CANCELLED | OUTPATIENT
Start: 2021-04-29 | End: 2021-04-29

## 2021-04-29 RX ORDER — MAGNESIUM SULFATE 1 G/100ML
1 INJECTION INTRAVENOUS ONCE
Status: COMPLETED | OUTPATIENT
Start: 2021-04-29 | End: 2021-04-29

## 2021-04-29 RX ORDER — NITROGLYCERIN 0.4 MG/1
0.4 TABLET SUBLINGUAL
Status: DISCONTINUED | OUTPATIENT
Start: 2021-04-29 | End: 2021-05-04 | Stop reason: HOSPADM

## 2021-04-29 RX ORDER — NICOTINE POLACRILEX 4 MG
15 LOZENGE BUCCAL
Status: DISCONTINUED | OUTPATIENT
Start: 2021-04-29 | End: 2021-05-04 | Stop reason: HOSPADM

## 2021-04-29 RX ORDER — ACETAMINOPHEN 650 MG/1
650 SUPPOSITORY RECTAL EVERY 4 HOURS PRN
Status: DISCONTINUED | OUTPATIENT
Start: 2021-04-29 | End: 2021-05-04 | Stop reason: HOSPADM

## 2021-04-29 RX ORDER — ASPIRIN 81 MG/1
81 TABLET ORAL DAILY
Status: DISCONTINUED | OUTPATIENT
Start: 2021-04-29 | End: 2021-05-04 | Stop reason: HOSPADM

## 2021-04-29 RX ORDER — SODIUM CHLORIDE 9 MG/ML
250 INJECTION, SOLUTION INTRAVENOUS AS NEEDED
Status: CANCELLED | OUTPATIENT
Start: 2021-04-29

## 2021-04-29 RX ORDER — INSULIN GLARGINE 100 [IU]/ML
20 INJECTION, SOLUTION SUBCUTANEOUS EVERY 12 HOURS SCHEDULED
Status: DISCONTINUED | OUTPATIENT
Start: 2021-04-29 | End: 2021-04-30

## 2021-04-29 RX ORDER — ONDANSETRON 4 MG/1
4 TABLET, FILM COATED ORAL EVERY 6 HOURS PRN
Status: DISCONTINUED | OUTPATIENT
Start: 2021-04-29 | End: 2021-05-04 | Stop reason: HOSPADM

## 2021-04-29 RX ORDER — SODIUM CHLORIDE 0.9 % (FLUSH) 0.9 %
10 SYRINGE (ML) INJECTION AS NEEDED
Status: DISCONTINUED | OUTPATIENT
Start: 2021-04-29 | End: 2021-05-04 | Stop reason: HOSPADM

## 2021-04-29 RX ORDER — ONDANSETRON 2 MG/ML
4 INJECTION INTRAMUSCULAR; INTRAVENOUS EVERY 6 HOURS PRN
Status: DISCONTINUED | OUTPATIENT
Start: 2021-04-29 | End: 2021-05-04 | Stop reason: HOSPADM

## 2021-04-29 RX ORDER — DEXTROSE MONOHYDRATE 25 G/50ML
25 INJECTION, SOLUTION INTRAVENOUS
Status: DISCONTINUED | OUTPATIENT
Start: 2021-04-29 | End: 2021-05-04 | Stop reason: HOSPADM

## 2021-04-29 RX ORDER — CARVEDILOL 3.12 MG/1
3.12 TABLET ORAL 2 TIMES DAILY
Status: DISCONTINUED | OUTPATIENT
Start: 2021-04-29 | End: 2021-05-04 | Stop reason: HOSPADM

## 2021-04-29 RX ORDER — SODIUM CHLORIDE 9 MG/ML
100 INJECTION, SOLUTION INTRAVENOUS CONTINUOUS
Status: DISCONTINUED | OUTPATIENT
Start: 2021-04-29 | End: 2021-04-30

## 2021-04-29 RX ORDER — ATORVASTATIN CALCIUM 20 MG/1
40 TABLET, FILM COATED ORAL DAILY
Status: DISCONTINUED | OUTPATIENT
Start: 2021-04-29 | End: 2021-05-04 | Stop reason: HOSPADM

## 2021-04-29 RX ORDER — LEVOTHYROXINE SODIUM 0.05 MG/1
50 TABLET ORAL DAILY
Status: DISCONTINUED | OUTPATIENT
Start: 2021-04-29 | End: 2021-05-04 | Stop reason: HOSPADM

## 2021-04-29 RX ORDER — INSULIN LISPRO 100 [IU]/ML
0-9 INJECTION, SOLUTION INTRAVENOUS; SUBCUTANEOUS
Status: DISCONTINUED | OUTPATIENT
Start: 2021-04-29 | End: 2021-05-04 | Stop reason: HOSPADM

## 2021-04-29 RX ORDER — INSULIN GLARGINE 100 [IU]/ML
20 INJECTION, SOLUTION SUBCUTANEOUS EVERY 12 HOURS SCHEDULED
Status: DISCONTINUED | OUTPATIENT
Start: 2021-04-29 | End: 2021-04-29

## 2021-04-29 RX ADMIN — ATORVASTATIN CALCIUM 40 MG: 20 TABLET, FILM COATED ORAL at 18:03

## 2021-04-29 RX ADMIN — SODIUM CHLORIDE 1000 ML: 9 INJECTION, SOLUTION INTRAVENOUS at 11:47

## 2021-04-29 RX ADMIN — INSULIN GLARGINE 20 UNITS: 100 INJECTION, SOLUTION SUBCUTANEOUS at 21:15

## 2021-04-29 RX ADMIN — SODIUM CHLORIDE, PRESERVATIVE FREE 10 ML: 5 INJECTION INTRAVENOUS at 21:23

## 2021-04-29 RX ADMIN — ASPIRIN 81 MG: 81 TABLET, COATED ORAL at 18:03

## 2021-04-29 RX ADMIN — MAGNESIUM SULFATE HEPTAHYDRATE 1 G: 1 INJECTION, SOLUTION INTRAVENOUS at 18:55

## 2021-04-29 RX ADMIN — ERYTHROPOIETIN 60000 UNITS: 40000 INJECTION, SOLUTION INTRAVENOUS; SUBCUTANEOUS at 11:47

## 2021-04-29 RX ADMIN — SODIUM CHLORIDE 100 ML/HR: 9 INJECTION, SOLUTION INTRAVENOUS at 17:43

## 2021-04-30 ENCOUNTER — HOSPITAL ENCOUNTER (OUTPATIENT)
Dept: INFUSION THERAPY | Facility: HOSPITAL | Age: 61
Setting detail: INFUSION SERIES
Discharge: HOME | End: 2021-04-30

## 2021-04-30 ENCOUNTER — APPOINTMENT (OUTPATIENT)
Dept: GENERAL RADIOLOGY | Facility: HOSPITAL | Age: 61
End: 2021-04-30

## 2021-04-30 DIAGNOSIS — N18.30 ANEMIA IN STAGE 3 CHRONIC KIDNEY DISEASE, UNSPECIFIED WHETHER STAGE 3A OR 3B CKD (HCC): ICD-10-CM

## 2021-04-30 DIAGNOSIS — D63.1 ANEMIA IN STAGE 3 CHRONIC KIDNEY DISEASE, UNSPECIFIED WHETHER STAGE 3A OR 3B CKD (HCC): ICD-10-CM

## 2021-04-30 PROBLEM — E43 SEVERE MALNUTRITION: Status: ACTIVE | Noted: 2021-04-30

## 2021-04-30 LAB
ANION GAP SERPL CALCULATED.3IONS-SCNC: 14.4 MMOL/L (ref 5–15)
BACTERIA UR QL AUTO: ABNORMAL /HPF
BILIRUB UR QL STRIP: NEGATIVE
BUN SERPL-MCNC: 73 MG/DL (ref 8–23)
BUN/CREAT SERPL: 11.3 (ref 7–25)
CALCIUM SPEC-SCNC: 7.9 MG/DL (ref 8.6–10.5)
CHLORIDE SERPL-SCNC: 103 MMOL/L (ref 98–107)
CLARITY UR: ABNORMAL
CO2 SERPL-SCNC: 12.6 MMOL/L (ref 22–29)
COLOR UR: ABNORMAL
CREAT SERPL-MCNC: 6.48 MG/DL (ref 0.57–1)
DEPRECATED RDW RBC AUTO: 59.3 FL (ref 37–54)
ERYTHROCYTE [DISTWIDTH] IN BLOOD BY AUTOMATED COUNT: 17.3 % (ref 12.3–15.4)
GFR SERPL CREATININE-BSD FRML MDRD: 7 ML/MIN/1.73
GFR SERPL CREATININE-BSD FRML MDRD: ABNORMAL ML/MIN/{1.73_M2}
GLUCOSE BLDC GLUCOMTR-MCNC: 212 MG/DL (ref 70–130)
GLUCOSE BLDC GLUCOMTR-MCNC: 214 MG/DL (ref 70–130)
GLUCOSE BLDC GLUCOMTR-MCNC: 226 MG/DL (ref 70–130)
GLUCOSE BLDC GLUCOMTR-MCNC: 294 MG/DL (ref 70–130)
GLUCOSE SERPL-MCNC: 197 MG/DL (ref 65–99)
GLUCOSE UR STRIP-MCNC: NEGATIVE MG/DL
HCT VFR BLD AUTO: 24.7 % (ref 34–46.6)
HGB BLD-MCNC: 7.9 G/DL (ref 12–15.9)
HGB UR QL STRIP.AUTO: ABNORMAL
HYALINE CASTS UR QL AUTO: ABNORMAL /LPF
KETONES UR QL STRIP: NEGATIVE
LEUKOCYTE ESTERASE UR QL STRIP.AUTO: ABNORMAL
MAGNESIUM SERPL-MCNC: 1.5 MG/DL (ref 1.6–2.4)
MAGNESIUM SERPL-MCNC: 1.5 MG/DL (ref 1.6–2.4)
MCH RBC QN AUTO: 30.9 PG (ref 26.6–33)
MCHC RBC AUTO-ENTMCNC: 32 G/DL (ref 31.5–35.7)
MCV RBC AUTO: 96.5 FL (ref 79–97)
NITRITE UR QL STRIP: NEGATIVE
PH UR STRIP.AUTO: 7.5 [PH] (ref 5–8)
PHOSPHATE SERPL-MCNC: 5 MG/DL (ref 2.5–4.5)
PLATELET # BLD AUTO: 273 10*3/MM3 (ref 140–450)
PMV BLD AUTO: 10.5 FL (ref 6–12)
POTASSIUM SERPL-SCNC: 4.1 MMOL/L (ref 3.5–5.2)
PROT UR QL STRIP: ABNORMAL
RBC # BLD AUTO: 2.56 10*6/MM3 (ref 3.77–5.28)
RBC # UR: ABNORMAL /HPF
REF LAB TEST METHOD: ABNORMAL
SODIUM SERPL-SCNC: 130 MMOL/L (ref 136–145)
SP GR UR STRIP: 1.02 (ref 1–1.03)
SQUAMOUS #/AREA URNS HPF: ABNORMAL /HPF
UROBILINOGEN UR QL STRIP: ABNORMAL
WBC # BLD AUTO: 7.85 10*3/MM3 (ref 3.4–10.8)
WBC UR QL AUTO: ABNORMAL /HPF

## 2021-04-30 PROCEDURE — 83735 ASSAY OF MAGNESIUM: CPT | Performed by: INTERNAL MEDICINE

## 2021-04-30 PROCEDURE — 81001 URINALYSIS AUTO W/SCOPE: CPT | Performed by: HOSPITALIST

## 2021-04-30 PROCEDURE — P9016 RBC LEUKOCYTES REDUCED: HCPCS

## 2021-04-30 PROCEDURE — 25010000002 CEFTRIAXONE PER 250 MG: Performed by: HOSPITALIST

## 2021-04-30 PROCEDURE — 25010000002 MAGNESIUM SULFATE 2 GM/50ML SOLUTION: Performed by: INTERNAL MEDICINE

## 2021-04-30 PROCEDURE — 85027 COMPLETE CBC AUTOMATED: CPT | Performed by: HOSPITALIST

## 2021-04-30 PROCEDURE — 36430 TRANSFUSION BLD/BLD COMPNT: CPT

## 2021-04-30 PROCEDURE — 87077 CULTURE AEROBIC IDENTIFY: CPT | Performed by: HOSPITALIST

## 2021-04-30 PROCEDURE — 84100 ASSAY OF PHOSPHORUS: CPT | Performed by: HOSPITALIST

## 2021-04-30 PROCEDURE — 87186 SC STD MICRODIL/AGAR DIL: CPT | Performed by: HOSPITALIST

## 2021-04-30 PROCEDURE — 80048 BASIC METABOLIC PNL TOTAL CA: CPT | Performed by: HOSPITALIST

## 2021-04-30 PROCEDURE — 86900 BLOOD TYPING SEROLOGIC ABO: CPT

## 2021-04-30 PROCEDURE — 25010000002 ONDANSETRON PER 1 MG: Performed by: HOSPITALIST

## 2021-04-30 PROCEDURE — 63710000001 INSULIN LISPRO (HUMAN) PER 5 UNITS: Performed by: HOSPITALIST

## 2021-04-30 PROCEDURE — 83735 ASSAY OF MAGNESIUM: CPT | Performed by: HOSPITALIST

## 2021-04-30 PROCEDURE — 63710000001 INSULIN GLARGINE PER 5 UNITS: Performed by: HOSPITALIST

## 2021-04-30 PROCEDURE — 82962 GLUCOSE BLOOD TEST: CPT

## 2021-04-30 PROCEDURE — 87086 URINE CULTURE/COLONY COUNT: CPT | Performed by: HOSPITALIST

## 2021-04-30 PROCEDURE — 71045 X-RAY EXAM CHEST 1 VIEW: CPT

## 2021-04-30 RX ORDER — INSULIN GLARGINE 100 [IU]/ML
25 INJECTION, SOLUTION SUBCUTANEOUS EVERY 12 HOURS SCHEDULED
Status: DISCONTINUED | OUTPATIENT
Start: 2021-04-30 | End: 2021-05-04 | Stop reason: HOSPADM

## 2021-04-30 RX ORDER — FUROSEMIDE 10 MG/ML
40 INJECTION INTRAMUSCULAR; INTRAVENOUS ONCE
Status: DISCONTINUED | OUTPATIENT
Start: 2021-04-30 | End: 2021-05-04 | Stop reason: HOSPADM

## 2021-04-30 RX ORDER — SODIUM BICARBONATE 650 MG/1
650 TABLET ORAL 4 TIMES DAILY
Status: DISCONTINUED | OUTPATIENT
Start: 2021-04-30 | End: 2021-05-04 | Stop reason: HOSPADM

## 2021-04-30 RX ORDER — CEFTRIAXONE SODIUM 1 G/50ML
1 INJECTION, SOLUTION INTRAVENOUS EVERY 24 HOURS
Status: COMPLETED | OUTPATIENT
Start: 2021-04-30 | End: 2021-05-02

## 2021-04-30 RX ORDER — SODIUM BICARBONATE IN D5W 150/1000ML
150 PLASTIC BAG, INJECTION (ML) INTRAVENOUS CONTINUOUS
Status: DISCONTINUED | OUTPATIENT
Start: 2021-04-30 | End: 2021-04-30

## 2021-04-30 RX ORDER — EXEMESTANE 25 MG/1
25 TABLET ORAL DAILY
Status: DISCONTINUED | OUTPATIENT
Start: 2021-04-30 | End: 2021-05-04 | Stop reason: HOSPADM

## 2021-04-30 RX ORDER — MAGNESIUM SULFATE HEPTAHYDRATE 40 MG/ML
2 INJECTION, SOLUTION INTRAVENOUS ONCE
Status: COMPLETED | OUTPATIENT
Start: 2021-04-30 | End: 2021-04-30

## 2021-04-30 RX ADMIN — CARVEDILOL 3.12 MG: 3.12 TABLET, FILM COATED ORAL at 08:50

## 2021-04-30 RX ADMIN — SODIUM BICARBONATE 650 MG: 650 TABLET ORAL at 21:15

## 2021-04-30 RX ADMIN — CEFTRIAXONE SODIUM 1 G: 1 INJECTION, SOLUTION INTRAVENOUS at 12:40

## 2021-04-30 RX ADMIN — INSULIN GLARGINE 20 UNITS: 100 INJECTION, SOLUTION SUBCUTANEOUS at 08:51

## 2021-04-30 RX ADMIN — ATORVASTATIN CALCIUM 40 MG: 20 TABLET, FILM COATED ORAL at 08:50

## 2021-04-30 RX ADMIN — Medication 150 MEQ: at 15:04

## 2021-04-30 RX ADMIN — ASPIRIN 81 MG: 81 TABLET, COATED ORAL at 08:50

## 2021-04-30 RX ADMIN — SODIUM BICARBONATE 650 MG: 650 TABLET ORAL at 18:31

## 2021-04-30 RX ADMIN — INSULIN LISPRO 4 UNITS: 100 INJECTION, SOLUTION INTRAVENOUS; SUBCUTANEOUS at 08:50

## 2021-04-30 RX ADMIN — ONDANSETRON 4 MG: 2 INJECTION INTRAMUSCULAR; INTRAVENOUS at 15:08

## 2021-04-30 RX ADMIN — INSULIN LISPRO 6 UNITS: 100 INJECTION, SOLUTION INTRAVENOUS; SUBCUTANEOUS at 12:39

## 2021-04-30 RX ADMIN — SODIUM CHLORIDE, PRESERVATIVE FREE 10 ML: 5 INJECTION INTRAVENOUS at 21:15

## 2021-04-30 RX ADMIN — SODIUM CHLORIDE 100 ML/HR: 9 INJECTION, SOLUTION INTRAVENOUS at 07:25

## 2021-04-30 RX ADMIN — SODIUM CHLORIDE, PRESERVATIVE FREE 10 ML: 5 INJECTION INTRAVENOUS at 10:46

## 2021-04-30 RX ADMIN — LEVOTHYROXINE SODIUM 50 MCG: 0.05 TABLET ORAL at 08:50

## 2021-04-30 RX ADMIN — MAGNESIUM SULFATE HEPTAHYDRATE 2 G: 2 INJECTION, SOLUTION INTRAVENOUS at 12:43

## 2021-04-30 RX ADMIN — ACETAMINOPHEN ORAL SOLUTION 650 MG: 325 SOLUTION ORAL at 17:39

## 2021-05-01 ENCOUNTER — ANESTHESIA (OUTPATIENT)
Dept: PERIOP | Facility: HOSPITAL | Age: 61
End: 2021-05-01

## 2021-05-01 ENCOUNTER — APPOINTMENT (OUTPATIENT)
Dept: GENERAL RADIOLOGY | Facility: HOSPITAL | Age: 61
End: 2021-05-01

## 2021-05-01 ENCOUNTER — ANESTHESIA EVENT (OUTPATIENT)
Dept: PERIOP | Facility: HOSPITAL | Age: 61
End: 2021-05-01

## 2021-05-01 LAB
ALBUMIN SERPL-MCNC: 2 G/DL (ref 3.5–5.2)
ANION GAP SERPL CALCULATED.3IONS-SCNC: 18.1 MMOL/L (ref 5–15)
BH BB BLOOD EXPIRATION DATE: NORMAL
BH BB BLOOD EXPIRATION DATE: NORMAL
BH BB BLOOD TYPE BARCODE: 6200
BH BB BLOOD TYPE BARCODE: 6200
BH BB DISPENSE STATUS: NORMAL
BH BB DISPENSE STATUS: NORMAL
BH BB PRODUCT CODE: NORMAL
BH BB PRODUCT CODE: NORMAL
BH BB UNIT NUMBER: NORMAL
BH BB UNIT NUMBER: NORMAL
BUN SERPL-MCNC: 78 MG/DL (ref 8–23)
BUN/CREAT SERPL: 11.2 (ref 7–25)
CALCIUM SPEC-SCNC: 7.7 MG/DL (ref 8.6–10.5)
CHLORIDE SERPL-SCNC: 102 MMOL/L (ref 98–107)
CO2 SERPL-SCNC: 10.9 MMOL/L (ref 22–29)
CREAT SERPL-MCNC: 6.98 MG/DL (ref 0.57–1)
CROSSMATCH INTERPRETATION: NORMAL
CROSSMATCH INTERPRETATION: NORMAL
DEPRECATED RDW RBC AUTO: 57.5 FL (ref 37–54)
ERYTHROCYTE [DISTWIDTH] IN BLOOD BY AUTOMATED COUNT: 16.7 % (ref 12.3–15.4)
GFR SERPL CREATININE-BSD FRML MDRD: 6 ML/MIN/1.73
GFR SERPL CREATININE-BSD FRML MDRD: ABNORMAL ML/MIN/{1.73_M2}
GLUCOSE BLDC GLUCOMTR-MCNC: 178 MG/DL (ref 70–130)
GLUCOSE BLDC GLUCOMTR-MCNC: 191 MG/DL (ref 70–130)
GLUCOSE BLDC GLUCOMTR-MCNC: 215 MG/DL (ref 70–130)
GLUCOSE BLDC GLUCOMTR-MCNC: 249 MG/DL (ref 70–130)
GLUCOSE BLDC GLUCOMTR-MCNC: 281 MG/DL (ref 70–130)
GLUCOSE SERPL-MCNC: 239 MG/DL (ref 65–99)
HBV SURFACE AG SERPL QL IA: NORMAL
HCT VFR BLD AUTO: 27.1 % (ref 34–46.6)
HGB BLD-MCNC: 8.8 G/DL (ref 12–15.9)
MAGNESIUM SERPL-MCNC: 1.7 MG/DL (ref 1.6–2.4)
MCH RBC QN AUTO: 31.4 PG (ref 26.6–33)
MCHC RBC AUTO-ENTMCNC: 32.5 G/DL (ref 31.5–35.7)
MCV RBC AUTO: 96.8 FL (ref 79–97)
PHOSPHATE SERPL-MCNC: 5.7 MG/DL (ref 2.5–4.5)
PLATELET # BLD AUTO: 228 10*3/MM3 (ref 140–450)
PMV BLD AUTO: 10.6 FL (ref 6–12)
POTASSIUM SERPL-SCNC: 4.5 MMOL/L (ref 3.5–5.2)
RBC # BLD AUTO: 2.8 10*6/MM3 (ref 3.77–5.28)
SODIUM SERPL-SCNC: 131 MMOL/L (ref 136–145)
UNIT  ABO: NORMAL
UNIT  ABO: NORMAL
UNIT  RH: NORMAL
UNIT  RH: NORMAL
URATE SERPL-MCNC: 12.3 MG/DL (ref 2.4–5.7)
WBC # BLD AUTO: 9.07 10*3/MM3 (ref 3.4–10.8)

## 2021-05-01 PROCEDURE — 25010000002 ONDANSETRON PER 1 MG: Performed by: HOSPITALIST

## 2021-05-01 PROCEDURE — 76000 FLUOROSCOPY <1 HR PHYS/QHP: CPT

## 2021-05-01 PROCEDURE — 80069 RENAL FUNCTION PANEL: CPT | Performed by: INTERNAL MEDICINE

## 2021-05-01 PROCEDURE — 25010000002 MIDAZOLAM PER 1 MG: Performed by: ANESTHESIOLOGY

## 2021-05-01 PROCEDURE — 25010000003 CEFAZOLIN IN DEXTROSE 2-4 GM/100ML-% SOLUTION: Performed by: SURGERY

## 2021-05-01 PROCEDURE — 74018 RADEX ABDOMEN 1 VIEW: CPT

## 2021-05-01 PROCEDURE — C1750 CATH, HEMODIALYSIS,LONG-TERM: HCPCS | Performed by: SURGERY

## 2021-05-01 PROCEDURE — 0JH63XZ INSERTION OF TUNNELED VASCULAR ACCESS DEVICE INTO CHEST SUBCUTANEOUS TISSUE AND FASCIA, PERCUTANEOUS APPROACH: ICD-10-PCS | Performed by: SURGERY

## 2021-05-01 PROCEDURE — 25010000002 HEPARIN (PORCINE) PER 1000 UNITS: Performed by: SURGERY

## 2021-05-01 PROCEDURE — B548ZZA ULTRASONOGRAPHY OF SUPERIOR VENA CAVA, GUIDANCE: ICD-10-PCS | Performed by: SURGERY

## 2021-05-01 PROCEDURE — 85027 COMPLETE CBC AUTOMATED: CPT | Performed by: HOSPITALIST

## 2021-05-01 PROCEDURE — 02HV33Z INSERTION OF INFUSION DEVICE INTO SUPERIOR VENA CAVA, PERCUTANEOUS APPROACH: ICD-10-PCS | Performed by: SURGERY

## 2021-05-01 PROCEDURE — 82962 GLUCOSE BLOOD TEST: CPT

## 2021-05-01 PROCEDURE — 63710000001 INSULIN LISPRO (HUMAN) PER 5 UNITS: Performed by: HOSPITALIST

## 2021-05-01 PROCEDURE — 83735 ASSAY OF MAGNESIUM: CPT | Performed by: INTERNAL MEDICINE

## 2021-05-01 PROCEDURE — 5A1D70Z PERFORMANCE OF URINARY FILTRATION, INTERMITTENT, LESS THAN 6 HOURS PER DAY: ICD-10-PCS | Performed by: SURGERY

## 2021-05-01 PROCEDURE — 25010000002 PROPOFOL 10 MG/ML EMULSION: Performed by: ANESTHESIOLOGY

## 2021-05-01 PROCEDURE — 25010000002 CEFTRIAXONE PER 250 MG: Performed by: HOSPITALIST

## 2021-05-01 PROCEDURE — 25010000003 LIDOCAINE 1 % SOLUTION 20 ML VIAL: Performed by: SURGERY

## 2021-05-01 PROCEDURE — 87340 HEPATITIS B SURFACE AG IA: CPT | Performed by: INTERNAL MEDICINE

## 2021-05-01 PROCEDURE — 63710000001 INSULIN GLARGINE PER 5 UNITS: Performed by: HOSPITALIST

## 2021-05-01 PROCEDURE — C1894 INTRO/SHEATH, NON-LASER: HCPCS | Performed by: SURGERY

## 2021-05-01 PROCEDURE — 84550 ASSAY OF BLOOD/URIC ACID: CPT | Performed by: HOSPITALIST

## 2021-05-01 RX ORDER — PROPOFOL 10 MG/ML
VIAL (ML) INTRAVENOUS CONTINUOUS PRN
Status: DISCONTINUED | OUTPATIENT
Start: 2021-05-01 | End: 2021-05-01 | Stop reason: SURG

## 2021-05-01 RX ORDER — FAMOTIDINE 10 MG/ML
20 INJECTION, SOLUTION INTRAVENOUS ONCE
Status: COMPLETED | OUTPATIENT
Start: 2021-05-01 | End: 2021-05-01

## 2021-05-01 RX ORDER — PROCHLORPERAZINE 25 MG
25 SUPPOSITORY, RECTAL RECTAL EVERY 12 HOURS PRN
Status: DISCONTINUED | OUTPATIENT
Start: 2021-05-01 | End: 2021-05-04 | Stop reason: HOSPADM

## 2021-05-01 RX ORDER — MIDAZOLAM HYDROCHLORIDE 1 MG/ML
2 INJECTION INTRAMUSCULAR; INTRAVENOUS
Status: COMPLETED | OUTPATIENT
Start: 2021-05-01 | End: 2021-05-01

## 2021-05-01 RX ORDER — LABETALOL HYDROCHLORIDE 5 MG/ML
5 INJECTION, SOLUTION INTRAVENOUS
Status: DISCONTINUED | OUTPATIENT
Start: 2021-05-01 | End: 2021-05-01 | Stop reason: HOSPADM

## 2021-05-01 RX ORDER — LIDOCAINE HYDROCHLORIDE 10 MG/ML
0.5 INJECTION, SOLUTION EPIDURAL; INFILTRATION; INTRACAUDAL; PERINEURAL ONCE AS NEEDED
Status: DISCONTINUED | OUTPATIENT
Start: 2021-05-01 | End: 2021-05-01 | Stop reason: HOSPADM

## 2021-05-01 RX ORDER — PROCHLORPERAZINE MALEATE 5 MG/1
5 TABLET ORAL EVERY 6 HOURS PRN
Status: DISCONTINUED | OUTPATIENT
Start: 2021-05-01 | End: 2021-05-04 | Stop reason: HOSPADM

## 2021-05-01 RX ORDER — MIDAZOLAM HYDROCHLORIDE 1 MG/ML
INJECTION INTRAMUSCULAR; INTRAVENOUS AS NEEDED
Status: DISCONTINUED | OUTPATIENT
Start: 2021-05-01 | End: 2021-05-01 | Stop reason: SURG

## 2021-05-01 RX ORDER — FLUMAZENIL 0.1 MG/ML
0.2 INJECTION INTRAVENOUS AS NEEDED
Status: DISCONTINUED | OUTPATIENT
Start: 2021-05-01 | End: 2021-05-01 | Stop reason: HOSPADM

## 2021-05-01 RX ORDER — CEFAZOLIN SODIUM 2 G/100ML
2 INJECTION, SOLUTION INTRAVENOUS ONCE
Status: COMPLETED | OUTPATIENT
Start: 2021-05-01 | End: 2021-05-01

## 2021-05-01 RX ORDER — HYDROCODONE BITARTRATE AND ACETAMINOPHEN 7.5; 325 MG/1; MG/1
1 TABLET ORAL ONCE AS NEEDED
Status: DISCONTINUED | OUTPATIENT
Start: 2021-05-01 | End: 2021-05-01 | Stop reason: HOSPADM

## 2021-05-01 RX ORDER — PROCHLORPERAZINE EDISYLATE 5 MG/ML
5 INJECTION INTRAMUSCULAR; INTRAVENOUS EVERY 6 HOURS PRN
Status: DISCONTINUED | OUTPATIENT
Start: 2021-05-01 | End: 2021-05-04 | Stop reason: HOSPADM

## 2021-05-01 RX ORDER — MIDAZOLAM HYDROCHLORIDE 1 MG/ML
1 INJECTION INTRAMUSCULAR; INTRAVENOUS
Status: COMPLETED | OUTPATIENT
Start: 2021-05-01 | End: 2021-05-01

## 2021-05-01 RX ORDER — PROMETHAZINE HYDROCHLORIDE 25 MG/1
25 TABLET ORAL ONCE AS NEEDED
Status: DISCONTINUED | OUTPATIENT
Start: 2021-05-01 | End: 2021-05-01 | Stop reason: HOSPADM

## 2021-05-01 RX ORDER — HYDROCODONE BITARTRATE AND ACETAMINOPHEN 5; 325 MG/1; MG/1
1 TABLET ORAL EVERY 6 HOURS PRN
Status: DISCONTINUED | OUTPATIENT
Start: 2021-05-01 | End: 2021-05-04 | Stop reason: HOSPADM

## 2021-05-01 RX ORDER — KETAMINE HYDROCHLORIDE 10 MG/ML
INJECTION INTRAMUSCULAR; INTRAVENOUS AS NEEDED
Status: DISCONTINUED | OUTPATIENT
Start: 2021-05-01 | End: 2021-05-01 | Stop reason: SURG

## 2021-05-01 RX ORDER — ONDANSETRON 2 MG/ML
4 INJECTION INTRAMUSCULAR; INTRAVENOUS ONCE AS NEEDED
Status: DISCONTINUED | OUTPATIENT
Start: 2021-05-01 | End: 2021-05-01 | Stop reason: HOSPADM

## 2021-05-01 RX ORDER — PROPOFOL 10 MG/ML
VIAL (ML) INTRAVENOUS AS NEEDED
Status: DISCONTINUED | OUTPATIENT
Start: 2021-05-01 | End: 2021-05-01 | Stop reason: SURG

## 2021-05-01 RX ORDER — EPHEDRINE SULFATE 50 MG/ML
5 INJECTION, SOLUTION INTRAVENOUS ONCE AS NEEDED
Status: DISCONTINUED | OUTPATIENT
Start: 2021-05-01 | End: 2021-05-01 | Stop reason: HOSPADM

## 2021-05-01 RX ORDER — DIPHENHYDRAMINE HYDROCHLORIDE 50 MG/ML
12.5 INJECTION INTRAMUSCULAR; INTRAVENOUS
Status: DISCONTINUED | OUTPATIENT
Start: 2021-05-01 | End: 2021-05-01 | Stop reason: HOSPADM

## 2021-05-01 RX ORDER — HYDRALAZINE HYDROCHLORIDE 20 MG/ML
5 INJECTION INTRAMUSCULAR; INTRAVENOUS
Status: DISCONTINUED | OUTPATIENT
Start: 2021-05-01 | End: 2021-05-01 | Stop reason: HOSPADM

## 2021-05-01 RX ORDER — PROMETHAZINE HYDROCHLORIDE 25 MG/1
25 SUPPOSITORY RECTAL ONCE AS NEEDED
Status: DISCONTINUED | OUTPATIENT
Start: 2021-05-01 | End: 2021-05-01 | Stop reason: HOSPADM

## 2021-05-01 RX ORDER — FENTANYL CITRATE 50 UG/ML
50 INJECTION, SOLUTION INTRAMUSCULAR; INTRAVENOUS
Status: DISCONTINUED | OUTPATIENT
Start: 2021-05-01 | End: 2021-05-01 | Stop reason: HOSPADM

## 2021-05-01 RX ORDER — SODIUM CHLORIDE 0.9 % (FLUSH) 0.9 %
3 SYRINGE (ML) INJECTION EVERY 12 HOURS SCHEDULED
Status: DISCONTINUED | OUTPATIENT
Start: 2021-05-01 | End: 2021-05-01 | Stop reason: HOSPADM

## 2021-05-01 RX ORDER — HEPARIN SODIUM 1000 [USP'U]/ML
INJECTION, SOLUTION INTRAVENOUS; SUBCUTANEOUS AS NEEDED
Status: DISCONTINUED | OUTPATIENT
Start: 2021-05-01 | End: 2021-05-01 | Stop reason: HOSPADM

## 2021-05-01 RX ORDER — NALOXONE HCL 0.4 MG/ML
0.2 VIAL (ML) INJECTION AS NEEDED
Status: DISCONTINUED | OUTPATIENT
Start: 2021-05-01 | End: 2021-05-01 | Stop reason: HOSPADM

## 2021-05-01 RX ORDER — SODIUM CHLORIDE 0.9 % (FLUSH) 0.9 %
3-10 SYRINGE (ML) INJECTION AS NEEDED
Status: DISCONTINUED | OUTPATIENT
Start: 2021-05-01 | End: 2021-05-01 | Stop reason: HOSPADM

## 2021-05-01 RX ORDER — DIPHENHYDRAMINE HCL 25 MG
25 CAPSULE ORAL
Status: DISCONTINUED | OUTPATIENT
Start: 2021-05-01 | End: 2021-05-01 | Stop reason: HOSPADM

## 2021-05-01 RX ORDER — OXYCODONE AND ACETAMINOPHEN 7.5; 325 MG/1; MG/1
1 TABLET ORAL ONCE AS NEEDED
Status: DISCONTINUED | OUTPATIENT
Start: 2021-05-01 | End: 2021-05-01 | Stop reason: HOSPADM

## 2021-05-01 RX ORDER — SODIUM CHLORIDE 9 MG/ML
9 INJECTION, SOLUTION INTRAVENOUS CONTINUOUS
Status: DISCONTINUED | OUTPATIENT
Start: 2021-05-01 | End: 2021-05-01

## 2021-05-01 RX ORDER — HYDROMORPHONE HYDROCHLORIDE 1 MG/ML
0.5 INJECTION, SOLUTION INTRAMUSCULAR; INTRAVENOUS; SUBCUTANEOUS
Status: DISCONTINUED | OUTPATIENT
Start: 2021-05-01 | End: 2021-05-01 | Stop reason: HOSPADM

## 2021-05-01 RX ORDER — SODIUM CHLORIDE, SODIUM LACTATE, POTASSIUM CHLORIDE, CALCIUM CHLORIDE 600; 310; 30; 20 MG/100ML; MG/100ML; MG/100ML; MG/100ML
9 INJECTION, SOLUTION INTRAVENOUS CONTINUOUS
Status: DISCONTINUED | OUTPATIENT
Start: 2021-05-01 | End: 2021-05-01

## 2021-05-01 RX ADMIN — SODIUM CHLORIDE 9 ML: 9 INJECTION, SOLUTION INTRAVENOUS at 15:48

## 2021-05-01 RX ADMIN — PROPOFOL 50 MG: 10 INJECTION, EMULSION INTRAVENOUS at 19:18

## 2021-05-01 RX ADMIN — SODIUM BICARBONATE 650 MG: 650 TABLET ORAL at 08:24

## 2021-05-01 RX ADMIN — ONDANSETRON 4 MG: 2 INJECTION INTRAMUSCULAR; INTRAVENOUS at 03:58

## 2021-05-01 RX ADMIN — LEVOTHYROXINE SODIUM 50 MCG: 0.05 TABLET ORAL at 08:24

## 2021-05-01 RX ADMIN — INSULIN GLARGINE 25 UNITS: 100 INJECTION, SOLUTION SUBCUTANEOUS at 08:27

## 2021-05-01 RX ADMIN — MIDAZOLAM 2 MG: 1 INJECTION INTRAMUSCULAR; INTRAVENOUS at 19:12

## 2021-05-01 RX ADMIN — CEFTRIAXONE SODIUM 1 G: 1 INJECTION, SOLUTION INTRAVENOUS at 11:38

## 2021-05-01 RX ADMIN — ATORVASTATIN CALCIUM 40 MG: 20 TABLET, FILM COATED ORAL at 08:24

## 2021-05-01 RX ADMIN — CEFAZOLIN SODIUM 2 G: 2 INJECTION, SOLUTION INTRAVENOUS at 18:56

## 2021-05-01 RX ADMIN — INSULIN LISPRO 6 UNITS: 100 INJECTION, SOLUTION INTRAVENOUS; SUBCUTANEOUS at 11:38

## 2021-05-01 RX ADMIN — FAMOTIDINE 20 MG: 10 INJECTION INTRAVENOUS at 15:48

## 2021-05-01 RX ADMIN — KETAMINE HYDROCHLORIDE 10 MG: 10 INJECTION INTRAMUSCULAR; INTRAVENOUS at 19:26

## 2021-05-01 RX ADMIN — CARVEDILOL 3.12 MG: 3.12 TABLET, FILM COATED ORAL at 08:24

## 2021-05-01 RX ADMIN — KETAMINE HYDROCHLORIDE 10 MG: 10 INJECTION INTRAMUSCULAR; INTRAVENOUS at 19:16

## 2021-05-01 RX ADMIN — EXEMESTANE 25 MG: 25 TABLET ORAL at 11:45

## 2021-05-01 RX ADMIN — KETAMINE HYDROCHLORIDE 10 MG: 10 INJECTION INTRAMUSCULAR; INTRAVENOUS at 19:14

## 2021-05-01 RX ADMIN — KETAMINE HYDROCHLORIDE 10 MG: 10 INJECTION INTRAMUSCULAR; INTRAVENOUS at 19:22

## 2021-05-01 RX ADMIN — ASPIRIN 81 MG: 81 TABLET, COATED ORAL at 08:24

## 2021-05-01 RX ADMIN — MIDAZOLAM 1 MG: 1 INJECTION INTRAMUSCULAR; INTRAVENOUS at 18:09

## 2021-05-01 RX ADMIN — PROPOFOL 100 MCG/KG/MIN: 10 INJECTION, EMULSION INTRAVENOUS at 19:18

## 2021-05-01 RX ADMIN — MIDAZOLAM 1 MG: 1 INJECTION INTRAMUSCULAR; INTRAVENOUS at 18:32

## 2021-05-01 RX ADMIN — CEFAZOLIN SODIUM 3 G: 2 INJECTION, SOLUTION INTRAVENOUS at 19:07

## 2021-05-01 RX ADMIN — KETAMINE HYDROCHLORIDE 10 MG: 10 INJECTION INTRAMUSCULAR; INTRAVENOUS at 19:32

## 2021-05-01 RX ADMIN — SODIUM CHLORIDE, PRESERVATIVE FREE 10 ML: 5 INJECTION INTRAVENOUS at 08:25

## 2021-05-01 RX ADMIN — ONDANSETRON 4 MG: 2 INJECTION INTRAMUSCULAR; INTRAVENOUS at 11:38

## 2021-05-01 RX ADMIN — INSULIN LISPRO 4 UNITS: 100 INJECTION, SOLUTION INTRAVENOUS; SUBCUTANEOUS at 08:24

## 2021-05-01 NOTE — ANESTHESIA PREPROCEDURE EVALUATION
Anesthesia Evaluation     Patient summary reviewed and Nursing notes reviewed   no history of anesthetic complications:  NPO Solid Status: > 6 hours             Airway   Mallampati: III  TM distance: >3 FB  Neck ROM: full  Large neck circumference and Possible difficult intubation  Dental - normal exam     Pulmonary - normal exam   (+) sleep apnea on CPAP,   (-) not a smoker  Cardiovascular - normal exam    (+) hypertension, valvular problems/murmurs (BiCuspid AV), CHF ,   (-) angina      Neuro/Psych  (+) psychiatric history Anxiety,     GI/Hepatic/Renal/Endo    (+) morbid obesity (BMI 59),  renal disease ARF, diabetes mellitus poorly controlled,     Musculoskeletal     Abdominal    Substance History      OB/GYN          Other   blood dyscrasia (Hb 8.8 S/P 2 units PRBC's ) anemia,   history of cancer (Breast ) active                    Anesthesia Plan    ASA 4     MAC       Anesthetic plan, all risks, benefits, and alternatives have been provided, discussed and informed consent has been obtained with: patient.

## 2021-05-02 LAB
ALBUMIN SERPL-MCNC: 2.4 G/DL (ref 3.5–5.2)
ANION GAP SERPL CALCULATED.3IONS-SCNC: 14.3 MMOL/L (ref 5–15)
BUN SERPL-MCNC: 41 MG/DL (ref 8–23)
BUN/CREAT SERPL: 9.8 (ref 7–25)
CALCIUM SPEC-SCNC: 7.9 MG/DL (ref 8.6–10.5)
CHLORIDE SERPL-SCNC: 101 MMOL/L (ref 98–107)
CO2 SERPL-SCNC: 18.7 MMOL/L (ref 22–29)
CREAT SERPL-MCNC: 4.17 MG/DL (ref 0.57–1)
GFR SERPL CREATININE-BSD FRML MDRD: 11 ML/MIN/1.73
GFR SERPL CREATININE-BSD FRML MDRD: ABNORMAL ML/MIN/{1.73_M2}
GLUCOSE BLDC GLUCOMTR-MCNC: 105 MG/DL (ref 70–130)
GLUCOSE BLDC GLUCOMTR-MCNC: 177 MG/DL (ref 70–130)
GLUCOSE BLDC GLUCOMTR-MCNC: 212 MG/DL (ref 70–130)
GLUCOSE BLDC GLUCOMTR-MCNC: 213 MG/DL (ref 70–130)
GLUCOSE SERPL-MCNC: 104 MG/DL (ref 65–99)
MAGNESIUM SERPL-MCNC: 1.6 MG/DL (ref 1.6–2.4)
PHOSPHATE SERPL-MCNC: 3.4 MG/DL (ref 2.5–4.5)
POTASSIUM SERPL-SCNC: 3.7 MMOL/L (ref 3.5–5.2)
SODIUM SERPL-SCNC: 134 MMOL/L (ref 136–145)

## 2021-05-02 PROCEDURE — 82962 GLUCOSE BLOOD TEST: CPT

## 2021-05-02 PROCEDURE — 25010000002 CEFTRIAXONE PER 250 MG: Performed by: HOSPITALIST

## 2021-05-02 PROCEDURE — 63710000001 INSULIN GLARGINE PER 5 UNITS: Performed by: SURGERY

## 2021-05-02 PROCEDURE — 83735 ASSAY OF MAGNESIUM: CPT | Performed by: SURGERY

## 2021-05-02 PROCEDURE — 80069 RENAL FUNCTION PANEL: CPT | Performed by: SURGERY

## 2021-05-02 PROCEDURE — 63710000001 INSULIN LISPRO (HUMAN) PER 5 UNITS: Performed by: SURGERY

## 2021-05-02 RX ADMIN — INSULIN LISPRO 4 UNITS: 100 INJECTION, SOLUTION INTRAVENOUS; SUBCUTANEOUS at 17:12

## 2021-05-02 RX ADMIN — SODIUM BICARBONATE 650 MG: 650 TABLET ORAL at 09:17

## 2021-05-02 RX ADMIN — SODIUM BICARBONATE 650 MG: 650 TABLET ORAL at 17:12

## 2021-05-02 RX ADMIN — SODIUM BICARBONATE 650 MG: 650 TABLET ORAL at 11:45

## 2021-05-02 RX ADMIN — SODIUM BICARBONATE 650 MG: 650 TABLET ORAL at 21:17

## 2021-05-02 RX ADMIN — CARVEDILOL 3.12 MG: 3.12 TABLET, FILM COATED ORAL at 09:17

## 2021-05-02 RX ADMIN — LEVOTHYROXINE SODIUM 50 MCG: 0.05 TABLET ORAL at 09:17

## 2021-05-02 RX ADMIN — ASPIRIN 81 MG: 81 TABLET, COATED ORAL at 09:17

## 2021-05-02 RX ADMIN — INSULIN GLARGINE 25 UNITS: 100 INJECTION, SOLUTION SUBCUTANEOUS at 21:17

## 2021-05-02 RX ADMIN — INSULIN GLARGINE 25 UNITS: 100 INJECTION, SOLUTION SUBCUTANEOUS at 09:20

## 2021-05-02 RX ADMIN — SODIUM CHLORIDE, PRESERVATIVE FREE 10 ML: 5 INJECTION INTRAVENOUS at 09:17

## 2021-05-02 RX ADMIN — SODIUM CHLORIDE, PRESERVATIVE FREE 10 ML: 5 INJECTION INTRAVENOUS at 21:47

## 2021-05-02 RX ADMIN — ATORVASTATIN CALCIUM 40 MG: 20 TABLET, FILM COATED ORAL at 09:17

## 2021-05-02 RX ADMIN — EXEMESTANE 25 MG: 25 TABLET ORAL at 09:17

## 2021-05-02 RX ADMIN — CEFTRIAXONE SODIUM 1 G: 1 INJECTION, SOLUTION INTRAVENOUS at 11:45

## 2021-05-02 RX ADMIN — INSULIN LISPRO 2 UNITS: 100 INJECTION, SOLUTION INTRAVENOUS; SUBCUTANEOUS at 11:45

## 2021-05-02 NOTE — ANESTHESIA POSTPROCEDURE EVALUATION
"Patient: Juana Hall    Procedure Summary     Date: 05/01/21 Room / Location: [facility] OR  / [facility] MAIN OR    Anesthesia Start: 1907 Anesthesia Stop: 1954    Procedure: HEMODIALYSIS CATHETER INSERTION (Right ) Diagnosis:     Surgeons: Clint Hernández MD Provider: Eliud Belle MD    Anesthesia Type: MAC ASA Status: 4          Anesthesia Type: MAC    Vitals  Vitals Value Taken Time   /59 05/01/21 2000   Temp 36.6 °C (97.8 °F) 05/01/21 1950   Pulse 60 05/01/21 2009   Resp 18 05/01/21 2000   SpO2 98 % 05/01/21 2009   Vitals shown include unvalidated device data.        Post Anesthesia Care and Evaluation    Patient location during evaluation: bedside  Patient participation: complete - patient participated  Level of consciousness: sleepy but conscious  Pain management: adequate  Airway patency: patent  Anesthetic complications: No anesthetic complications    Cardiovascular status: acceptable  Respiratory status: acceptable  Hydration status: acceptable    Comments: /59   Pulse 60   Temp 36.6 °C (97.8 °F) (Oral)   Resp 18   Ht 170.2 cm (67.01\")   Wt (!) 171 kg (377 lb 13.9 oz)   LMP  (LMP Unknown)   SpO2 100%   BMI 59.17 kg/m²     Patient is stable postoperatively and has adequately recovered from anesthesia as described above unless otherwise noted      "

## 2021-05-03 ENCOUNTER — TELEPHONE (OUTPATIENT)
Dept: ONCOLOGY | Facility: CLINIC | Age: 61
End: 2021-05-03

## 2021-05-03 LAB
ALBUMIN SERPL-MCNC: 2.1 G/DL (ref 3.5–5.2)
ANION GAP SERPL CALCULATED.3IONS-SCNC: 13.9 MMOL/L (ref 5–15)
BACTERIA SPEC AEROBE CULT: ABNORMAL
BACTERIA SPEC AEROBE CULT: ABNORMAL
BASOPHILS # BLD MANUAL: 0.1 10*3/MM3 (ref 0–0.2)
BASOPHILS NFR BLD AUTO: 2.1 % (ref 0–1.5)
BUN SERPL-MCNC: 50 MG/DL (ref 8–23)
BUN/CREAT SERPL: 8.8 (ref 7–25)
BURR CELLS BLD QL SMEAR: ABNORMAL
CALCIUM SPEC-SCNC: 7.7 MG/DL (ref 8.6–10.5)
CHLORIDE SERPL-SCNC: 100 MMOL/L (ref 98–107)
CO2 SERPL-SCNC: 21.1 MMOL/L (ref 22–29)
CREAT SERPL-MCNC: 5.69 MG/DL (ref 0.57–1)
DEPRECATED RDW RBC AUTO: 58.9 FL (ref 37–54)
EOSINOPHIL # BLD MANUAL: 0.1 10*3/MM3 (ref 0–0.4)
EOSINOPHIL NFR BLD MANUAL: 2.1 % (ref 0.3–6.2)
ERYTHROCYTE [DISTWIDTH] IN BLOOD BY AUTOMATED COUNT: 17.2 % (ref 12.3–15.4)
GFR SERPL CREATININE-BSD FRML MDRD: 8 ML/MIN/1.73
GFR SERPL CREATININE-BSD FRML MDRD: ABNORMAL ML/MIN/{1.73_M2}
GIANT PLATELETS: ABNORMAL
GLUCOSE BLDC GLUCOMTR-MCNC: 120 MG/DL (ref 70–130)
GLUCOSE BLDC GLUCOMTR-MCNC: 17 MG/DL (ref 70–130)
GLUCOSE BLDC GLUCOMTR-MCNC: 177 MG/DL (ref 70–130)
GLUCOSE BLDC GLUCOMTR-MCNC: 189 MG/DL (ref 70–130)
GLUCOSE BLDC GLUCOMTR-MCNC: 259 MG/DL (ref 70–130)
GLUCOSE BLDC GLUCOMTR-MCNC: 99 MG/DL (ref 70–130)
GLUCOSE SERPL-MCNC: 95 MG/DL (ref 65–99)
HCT VFR BLD AUTO: 24.5 % (ref 34–46.6)
HGB BLD-MCNC: 7.9 G/DL (ref 12–15.9)
LYMPHOCYTES # BLD MANUAL: 0.41 10*3/MM3 (ref 0.7–3.1)
LYMPHOCYTES NFR BLD MANUAL: 3.1 % (ref 5–12)
LYMPHOCYTES NFR BLD MANUAL: 8.3 % (ref 19.6–45.3)
MAGNESIUM SERPL-MCNC: 1.7 MG/DL (ref 1.6–2.4)
MCH RBC QN AUTO: 30.6 PG (ref 26.6–33)
MCHC RBC AUTO-ENTMCNC: 32.2 G/DL (ref 31.5–35.7)
MCV RBC AUTO: 95 FL (ref 79–97)
MONOCYTES # BLD AUTO: 0.15 10*3/MM3 (ref 0.1–0.9)
NEUTROPHILS # BLD AUTO: 4.14 10*3/MM3 (ref 1.7–7)
NEUTROPHILS NFR BLD MANUAL: 84.4 % (ref 42.7–76)
PHOSPHATE SERPL-MCNC: 4.9 MG/DL (ref 2.5–4.5)
PLATELET # BLD AUTO: 205 10*3/MM3 (ref 140–450)
PMV BLD AUTO: 10.6 FL (ref 6–12)
POIKILOCYTOSIS BLD QL SMEAR: ABNORMAL
POLYCHROMASIA BLD QL SMEAR: ABNORMAL
POTASSIUM SERPL-SCNC: 4 MMOL/L (ref 3.5–5.2)
RBC # BLD AUTO: 2.58 10*6/MM3 (ref 3.77–5.28)
SODIUM SERPL-SCNC: 135 MMOL/L (ref 136–145)
WBC # BLD AUTO: 4.9 10*3/MM3 (ref 3.4–10.8)
WBC MORPH BLD: NORMAL

## 2021-05-03 PROCEDURE — 63710000001 INSULIN LISPRO (HUMAN) PER 5 UNITS: Performed by: SURGERY

## 2021-05-03 PROCEDURE — 80069 RENAL FUNCTION PANEL: CPT | Performed by: SURGERY

## 2021-05-03 PROCEDURE — 82962 GLUCOSE BLOOD TEST: CPT

## 2021-05-03 PROCEDURE — 85007 BL SMEAR W/DIFF WBC COUNT: CPT | Performed by: INTERNAL MEDICINE

## 2021-05-03 PROCEDURE — 63710000001 INSULIN GLARGINE PER 5 UNITS: Performed by: SURGERY

## 2021-05-03 PROCEDURE — 85025 COMPLETE CBC W/AUTO DIFF WBC: CPT | Performed by: INTERNAL MEDICINE

## 2021-05-03 PROCEDURE — 83735 ASSAY OF MAGNESIUM: CPT | Performed by: SURGERY

## 2021-05-03 RX ORDER — CEPHALEXIN 500 MG/1
500 CAPSULE ORAL EVERY 12 HOURS SCHEDULED
Status: DISCONTINUED | OUTPATIENT
Start: 2021-05-03 | End: 2021-05-04 | Stop reason: HOSPADM

## 2021-05-03 RX ADMIN — SODIUM CHLORIDE, PRESERVATIVE FREE 10 ML: 5 INJECTION INTRAVENOUS at 22:22

## 2021-05-03 RX ADMIN — ASPIRIN 81 MG: 81 TABLET, COATED ORAL at 08:37

## 2021-05-03 RX ADMIN — SODIUM CHLORIDE, PRESERVATIVE FREE 10 ML: 5 INJECTION INTRAVENOUS at 09:47

## 2021-05-03 RX ADMIN — SODIUM BICARBONATE 650 MG: 650 TABLET ORAL at 21:12

## 2021-05-03 RX ADMIN — INSULIN GLARGINE 25 UNITS: 100 INJECTION, SOLUTION SUBCUTANEOUS at 08:37

## 2021-05-03 RX ADMIN — CARVEDILOL 3.12 MG: 3.12 TABLET, FILM COATED ORAL at 08:37

## 2021-05-03 RX ADMIN — EXEMESTANE 25 MG: 25 TABLET ORAL at 08:37

## 2021-05-03 RX ADMIN — SODIUM BICARBONATE 650 MG: 650 TABLET ORAL at 11:41

## 2021-05-03 RX ADMIN — CEPHALEXIN 500 MG: 500 CAPSULE ORAL at 21:12

## 2021-05-03 RX ADMIN — ATORVASTATIN CALCIUM 40 MG: 20 TABLET, FILM COATED ORAL at 08:37

## 2021-05-03 RX ADMIN — INSULIN LISPRO 2 UNITS: 100 INJECTION, SOLUTION INTRAVENOUS; SUBCUTANEOUS at 11:41

## 2021-05-03 RX ADMIN — SODIUM BICARBONATE 650 MG: 650 TABLET ORAL at 08:37

## 2021-05-03 RX ADMIN — SODIUM BICARBONATE 650 MG: 650 TABLET ORAL at 18:10

## 2021-05-03 RX ADMIN — LEVOTHYROXINE SODIUM 50 MCG: 0.05 TABLET ORAL at 08:37

## 2021-05-03 RX ADMIN — INSULIN LISPRO 2 UNITS: 100 INJECTION, SOLUTION INTRAVENOUS; SUBCUTANEOUS at 18:10

## 2021-05-03 RX ADMIN — INSULIN GLARGINE 25 UNITS: 100 INJECTION, SOLUTION SUBCUTANEOUS at 21:12

## 2021-05-03 RX ADMIN — CEPHALEXIN 500 MG: 500 CAPSULE ORAL at 15:43

## 2021-05-03 NOTE — TELEPHONE ENCOUNTER
Caller: AZAEL ALEXANDER    Relationship to patient: SELF    Best call back number: [phone]    Chief complaint: PT IS CALLING TO RESCHEDULE HER APPT. STATES SHE IS STARTING DIALYSIS THE SAME DAY AND NEEDS TO MOVE THE TIME OF HER APPT TO LATER IN THE DAY    Type of visit: LAB, FOLLOW UP, INJECTION    Requested date: 05/06/21 LATER IN THE DAY OR 05/07/21 IF THERE ARE NO LATER APPTS ON 05/06/21    If rescheduling, when is the original appointment: 05/06/21

## 2021-05-04 ENCOUNTER — READMISSION MANAGEMENT (OUTPATIENT)
Dept: CALL CENTER | Facility: HOSPITAL | Age: 61
End: 2021-05-04

## 2021-05-04 VITALS
DIASTOLIC BLOOD PRESSURE: 67 MMHG | TEMPERATURE: 97.3 F | RESPIRATION RATE: 18 BRPM | HEART RATE: 68 BPM | HEIGHT: 67 IN | WEIGHT: 293 LBS | SYSTOLIC BLOOD PRESSURE: 142 MMHG | BODY MASS INDEX: 45.99 KG/M2 | OXYGEN SATURATION: 99 %

## 2021-05-04 LAB
ALBUMIN SERPL-MCNC: 2.4 G/DL (ref 3.5–5.2)
ANION GAP SERPL CALCULATED.3IONS-SCNC: 14.7 MMOL/L (ref 5–15)
BASOPHILS # BLD MANUAL: 0.08 10*3/MM3 (ref 0–0.2)
BASOPHILS NFR BLD AUTO: 2 % (ref 0–1.5)
BUN SERPL-MCNC: 53 MG/DL (ref 8–23)
BUN/CREAT SERPL: 8.8 (ref 7–25)
CALCIUM SPEC-SCNC: 7.9 MG/DL (ref 8.6–10.5)
CHLORIDE SERPL-SCNC: 100 MMOL/L (ref 98–107)
CO2 SERPL-SCNC: 19.3 MMOL/L (ref 22–29)
CREAT SERPL-MCNC: 6.01 MG/DL (ref 0.57–1)
DEPRECATED RDW RBC AUTO: 58.9 FL (ref 37–54)
ERYTHROCYTE [DISTWIDTH] IN BLOOD BY AUTOMATED COUNT: 16.7 % (ref 12.3–15.4)
GFR SERPL CREATININE-BSD FRML MDRD: 7 ML/MIN/1.73
GFR SERPL CREATININE-BSD FRML MDRD: ABNORMAL ML/MIN/{1.73_M2}
GLUCOSE BLDC GLUCOMTR-MCNC: 102 MG/DL (ref 70–130)
GLUCOSE BLDC GLUCOMTR-MCNC: 210 MG/DL (ref 70–130)
GLUCOSE SERPL-MCNC: 96 MG/DL (ref 65–99)
HCT VFR BLD AUTO: 27 % (ref 34–46.6)
HGB BLD-MCNC: 8.7 G/DL (ref 12–15.9)
LYMPHOCYTES # BLD MANUAL: 0.34 10*3/MM3 (ref 0.7–3.1)
LYMPHOCYTES NFR BLD MANUAL: 3.1 % (ref 5–12)
LYMPHOCYTES NFR BLD MANUAL: 8.2 % (ref 19.6–45.3)
MAGNESIUM SERPL-MCNC: 1.8 MG/DL (ref 1.6–2.4)
MCH RBC QN AUTO: 31 PG (ref 26.6–33)
MCHC RBC AUTO-ENTMCNC: 32.2 G/DL (ref 31.5–35.7)
MCV RBC AUTO: 96.1 FL (ref 79–97)
MONOCYTES # BLD AUTO: 0.13 10*3/MM3 (ref 0.1–0.9)
NEUTROPHILS # BLD AUTO: 3.55 10*3/MM3 (ref 1.7–7)
NEUTROPHILS NFR BLD MANUAL: 86.7 % (ref 42.7–76)
PHOSPHATE SERPL-MCNC: 5.1 MG/DL (ref 2.5–4.5)
PLAT MORPH BLD: NORMAL
PLATELET # BLD AUTO: 217 10*3/MM3 (ref 140–450)
PMV BLD AUTO: 10.6 FL (ref 6–12)
POTASSIUM SERPL-SCNC: 4.2 MMOL/L (ref 3.5–5.2)
RBC # BLD AUTO: 2.81 10*6/MM3 (ref 3.77–5.28)
RBC MORPH BLD: NORMAL
SODIUM SERPL-SCNC: 134 MMOL/L (ref 136–145)
WBC # BLD AUTO: 4.1 10*3/MM3 (ref 3.4–10.8)
WBC MORPH BLD: NORMAL

## 2021-05-04 PROCEDURE — 80069 RENAL FUNCTION PANEL: CPT | Performed by: SURGERY

## 2021-05-04 PROCEDURE — 25010000002 HEPARIN (PORCINE) PER 1000 UNITS: Performed by: INTERNAL MEDICINE

## 2021-05-04 PROCEDURE — 85025 COMPLETE CBC W/AUTO DIFF WBC: CPT | Performed by: INTERNAL MEDICINE

## 2021-05-04 PROCEDURE — 82962 GLUCOSE BLOOD TEST: CPT

## 2021-05-04 PROCEDURE — 85007 BL SMEAR W/DIFF WBC COUNT: CPT | Performed by: INTERNAL MEDICINE

## 2021-05-04 PROCEDURE — 83735 ASSAY OF MAGNESIUM: CPT | Performed by: SURGERY

## 2021-05-04 RX ORDER — HEPARIN SODIUM 1000 [USP'U]/ML
3800 INJECTION, SOLUTION INTRAVENOUS; SUBCUTANEOUS ONCE
Status: COMPLETED | OUTPATIENT
Start: 2021-05-04 | End: 2021-05-04

## 2021-05-04 RX ORDER — ALBUMIN (HUMAN) 12.5 G/50ML
12.5 SOLUTION INTRAVENOUS AS NEEDED
Status: DISCONTINUED | OUTPATIENT
Start: 2021-05-04 | End: 2021-05-04 | Stop reason: HOSPADM

## 2021-05-04 RX ORDER — CEPHALEXIN 500 MG/1
500 CAPSULE ORAL EVERY 12 HOURS SCHEDULED
Qty: 12 CAPSULE | Refills: 0 | Status: SHIPPED | OUTPATIENT
Start: 2021-05-04 | End: 2021-05-10

## 2021-05-04 RX ADMIN — ATORVASTATIN CALCIUM 40 MG: 20 TABLET, FILM COATED ORAL at 17:43

## 2021-05-04 RX ADMIN — EXEMESTANE 25 MG: 25 TABLET ORAL at 17:43

## 2021-05-04 RX ADMIN — HEPARIN SODIUM 3800 UNITS: 1000 INJECTION INTRAVENOUS; SUBCUTANEOUS at 12:59

## 2021-05-04 RX ADMIN — ASPIRIN 81 MG: 81 TABLET, COATED ORAL at 17:43

## 2021-05-04 NOTE — OUTREACH NOTE
Prep Survey      Responses   [facility] patient discharged from?  [place]   Is LACE score < 7 ?  No   Emergency Room discharge w/ pulse ox?  No   Eligibility  [facility]   Date of Admission  04/29/21   Date of Discharge  05/04/21   Discharge Disposition  Home or Self Care   Discharge diagnosis  HEMODIALYSIS CATHETER INSERTION,  severe malnutrition   Does the patient have one of the following disease processes/diagnoses(primary or secondary)?  Other   Does the patient have Home health ordered?  No   Is there a DME ordered?  No   Prep survey completed?  Yes          Delaney Adrian RN

## 2021-05-05 ENCOUNTER — TRANSITIONAL CARE MANAGEMENT TELEPHONE ENCOUNTER (OUTPATIENT)
Dept: CALL CENTER | Facility: HOSPITAL | Age: 61
End: 2021-05-05

## 2021-05-05 NOTE — OUTREACH NOTE
Call Center TCM Note      Responses   [facility] patient discharged from?  [place]   Does the patient have one of the following disease processes/diagnoses(primary or secondary)?  Other   TCM attempt successful?  Yes   Discharge diagnosis  HEMODIALYSIS CATHETER INSERTION,  severe malnutrition   Meds reviewed with patient/caregiver?  Yes   Is the patient having any side effects they believe may be caused by any medication additions or changes?  No   Does the patient have all medications ordered at discharge?  Yes   Is the patient taking all medications as directed (includes completed medication regime)?  Yes   Does the patient have a primary care provider?   Yes   Does the patient have an appointment with their PCP within 7 days of discharge?  Greater than 7 days   Comments regarding PCP  TCM FWP with PCP Kiana ALBERT is 05/13/2021   What is preventing the patient from scheduling follow up appointments within 7 days of discharge?  Earlier appointment not available   Has the patient kept scheduled appointments due by today?  N/A   Has home health visited the patient within 72 hours of discharge?  N/A   Psychosocial issues?  No   Did the patient receive a copy of their discharge instructions?  Yes   Nursing interventions  Reviewed instructions with patient   What is the patient's perception of their health status since discharge?  Improving   Is the patient/caregiver able to teach back signs and symptoms related to disease process for when to call PCP?  Yes   Is the patient/caregiver able to teach back signs and symptoms related to disease process for when to call 911?  Yes   Is the patient/caregiver able to teach back the hierarchy of who to call/visit for symptoms/problems? PCP, Specialist, Home health nurse, Urgent Care, ED, 911  Yes   If the patient is a current smoker, are they able to teach back resources for cessation?  Not a smoker   TCM call completed?  Yes   Wrap up additional comments  Pt  sounds well and states she is feeling good. Med in place. No questions. She is working on getting all needed appts scheduled. TCM FWP with PCP Kiana ALBERT is 05/13/2021          Mary Villeda MA    5/5/2021, 15:04 EDT

## 2021-05-06 ENCOUNTER — LAB (OUTPATIENT)
Dept: LAB | Facility: HOSPITAL | Age: 61
End: 2021-05-06

## 2021-05-06 ENCOUNTER — APPOINTMENT (OUTPATIENT)
Dept: ONCOLOGY | Facility: HOSPITAL | Age: 61
End: 2021-05-06

## 2021-05-06 ENCOUNTER — INFUSION (OUTPATIENT)
Dept: ONCOLOGY | Facility: HOSPITAL | Age: 61
End: 2021-05-06

## 2021-05-06 ENCOUNTER — OFFICE VISIT (OUTPATIENT)
Dept: ONCOLOGY | Facility: CLINIC | Age: 61
End: 2021-05-06

## 2021-05-06 ENCOUNTER — APPOINTMENT (OUTPATIENT)
Dept: LAB | Facility: HOSPITAL | Age: 61
End: 2021-05-06

## 2021-05-06 VITALS
TEMPERATURE: 96.9 F | DIASTOLIC BLOOD PRESSURE: 78 MMHG | OXYGEN SATURATION: 96 % | HEIGHT: 67 IN | SYSTOLIC BLOOD PRESSURE: 125 MMHG | RESPIRATION RATE: 16 BRPM | HEART RATE: 76 BPM | BODY MASS INDEX: 58.03 KG/M2

## 2021-05-06 DIAGNOSIS — N18.30 ANEMIA IN STAGE 3 CHRONIC KIDNEY DISEASE, UNSPECIFIED WHETHER STAGE 3A OR 3B CKD (HCC): ICD-10-CM

## 2021-05-06 DIAGNOSIS — C50.919 METASTATIC BREAST CANCER: Primary | ICD-10-CM

## 2021-05-06 DIAGNOSIS — D63.8 ANEMIA, CHRONIC DISEASE: Primary | ICD-10-CM

## 2021-05-06 DIAGNOSIS — D63.1 ANEMIA IN STAGE 3 CHRONIC KIDNEY DISEASE, UNSPECIFIED WHETHER STAGE 3A OR 3B CKD (HCC): ICD-10-CM

## 2021-05-06 DIAGNOSIS — D64.9 NORMOCYTIC ANEMIA: Primary | ICD-10-CM

## 2021-05-06 DIAGNOSIS — N18.32 STAGE 3B CHRONIC KIDNEY DISEASE (HCC): ICD-10-CM

## 2021-05-06 DIAGNOSIS — D63.8 ANEMIA, CHRONIC DISEASE: ICD-10-CM

## 2021-05-06 LAB
ALBUMIN SERPL-MCNC: 2.5 G/DL (ref 3.5–5.2)
ALBUMIN/GLOB SERPL: 0.5 G/DL (ref 1.1–2.4)
ALP SERPL-CCNC: 180 U/L (ref 38–116)
ALT SERPL W P-5'-P-CCNC: 9 U/L (ref 0–33)
ANION GAP SERPL CALCULATED.3IONS-SCNC: 13.5 MMOL/L (ref 5–15)
AST SERPL-CCNC: 41 U/L (ref 0–32)
BASOPHILS # BLD AUTO: 0.07 10*3/MM3 (ref 0–0.2)
BASOPHILS NFR BLD AUTO: 1.3 % (ref 0–1.5)
BILIRUB SERPL-MCNC: 0.6 MG/DL (ref 0.2–1.2)
BUN SERPL-MCNC: 13 MG/DL (ref 6–20)
BUN/CREAT SERPL: 4.4 (ref 7.3–30)
CALCIUM SPEC-SCNC: 8.3 MG/DL (ref 8.5–10.2)
CHLORIDE SERPL-SCNC: 99 MMOL/L (ref 98–107)
CO2 SERPL-SCNC: 23.5 MMOL/L (ref 22–29)
CREAT SERPL-MCNC: 2.97 MG/DL (ref 0.6–1.1)
DEPRECATED RDW RBC AUTO: 65 FL (ref 37–54)
EOSINOPHIL # BLD AUTO: 0.07 10*3/MM3 (ref 0–0.4)
EOSINOPHIL NFR BLD AUTO: 1.3 % (ref 0.3–6.2)
ERYTHROCYTE [DISTWIDTH] IN BLOOD BY AUTOMATED COUNT: 17.6 % (ref 12.3–15.4)
GFR SERPL CREATININE-BSD FRML MDRD: 16 ML/MIN/1.73
GLOBULIN UR ELPH-MCNC: 4.6 GM/DL (ref 1.8–3.5)
GLUCOSE SERPL-MCNC: 246 MG/DL (ref 74–124)
HCT VFR BLD AUTO: 30.6 % (ref 34–46.6)
HGB BLD-MCNC: 9.7 G/DL (ref 12–15.9)
IMM GRANULOCYTES # BLD AUTO: 0.05 10*3/MM3 (ref 0–0.05)
IMM GRANULOCYTES NFR BLD AUTO: 0.9 % (ref 0–0.5)
LYMPHOCYTES # BLD AUTO: 0.69 10*3/MM3 (ref 0.7–3.1)
LYMPHOCYTES NFR BLD AUTO: 12.8 % (ref 19.6–45.3)
MCH RBC QN AUTO: 31.9 PG (ref 26.6–33)
MCHC RBC AUTO-ENTMCNC: 31.7 G/DL (ref 31.5–35.7)
MCV RBC AUTO: 100.7 FL (ref 79–97)
MONOCYTES # BLD AUTO: 0.37 10*3/MM3 (ref 0.1–0.9)
MONOCYTES NFR BLD AUTO: 6.8 % (ref 5–12)
NEUTROPHILS NFR BLD AUTO: 4.16 10*3/MM3 (ref 1.7–7)
NEUTROPHILS NFR BLD AUTO: 76.9 % (ref 42.7–76)
NRBC BLD AUTO-RTO: 0 /100 WBC (ref 0–0.2)
PLATELET # BLD AUTO: 200 10*3/MM3 (ref 140–450)
PMV BLD AUTO: 10.1 FL (ref 6–12)
POTASSIUM SERPL-SCNC: 4 MMOL/L (ref 3.5–4.7)
PROT SERPL-MCNC: 7.1 G/DL (ref 6.3–8)
RBC # BLD AUTO: 3.04 10*6/MM3 (ref 3.77–5.28)
SODIUM SERPL-SCNC: 136 MMOL/L (ref 134–145)
WBC # BLD AUTO: 5.41 10*3/MM3 (ref 3.4–10.8)

## 2021-05-06 PROCEDURE — 85025 COMPLETE CBC W/AUTO DIFF WBC: CPT

## 2021-05-06 PROCEDURE — 99214 OFFICE O/P EST MOD 30 MIN: CPT | Performed by: NURSE PRACTITIONER

## 2021-05-06 PROCEDURE — 80053 COMPREHEN METABOLIC PANEL: CPT

## 2021-05-06 PROCEDURE — 25010000002 EPOETIN ALFA PER 1000 UNITS: Performed by: INTERNAL MEDICINE

## 2021-05-06 PROCEDURE — 96372 THER/PROPH/DIAG INJ SC/IM: CPT

## 2021-05-06 PROCEDURE — 36415 COLL VENOUS BLD VENIPUNCTURE: CPT

## 2021-05-06 RX ADMIN — ERYTHROPOIETIN 60000 UNITS: 40000 INJECTION, SOLUTION INTRAVENOUS; SUBCUTANEOUS at 12:57

## 2021-05-06 NOTE — PROGRESS NOTES
Chief Complaint  Metastatic lobular breast cancer (ER/AL positive, HER-2/tj negative), anemia secondary to CKD3, CHF, peripheral neuropathy, chemotherapy related nausea chemotherapy-induced myelosuppression    Subjective        History of Present Illness  The patient is seen today in follow-up after recent hospitalization 4/29 to 5/4/2021.  Patient was actually seen in our office on 4/29/2021 receiving Procrit that day for hemoglobin of 7.9.  However creatinine returned elevated at 7 with evidence of acute on chronic kidney disease requiring admission.  She received 2 units PRBCs.  Patient did require placement of tunneled catheter and initiation of dialysis.    It was our recommendation that the patient continue on Aromasin while hospitalized however we did hold Ibrance.    She is therefore seen back today, being discharged home yesterday.  She states she did restart her Ibrance yesterday.  Therefore her last cycle of Ibrance was actually 2 weeks on, 1 week off.  She is asking how she should take Ibrance going forward with this cycle.  We discussed it probably makes the most sense considering her stable blood work today to call yesterday the beginning of her next cycle and consider her last cycle is shortened period.    Patient just came today from dialysis and states she is tolerating this fine.  Repeat creatinine pending today but most recent creatinine prior to discharge was still elevated at 6.    Patient currently has an indwelling catheter after being found to have a staghorn renal stone in the left kidney.  This may require surgery.  She will follow up with urology 5/17/2021 with repeat imaging at that time to determine further plan of care.  Patient does also continue on a course of Keflex, previously taking a different antibiotic for UTI symptoms.  She has 5 days of Keflex left.    She denies other concerns this time.    Objective   Vital Signs:   /78   Pulse 76   Temp 96.9 °F (36.1 °C) (Skin)    "Resp 16   Ht 170.2 cm (67.01\")   SpO2 96%   BMI 58.03 kg/m²     Physical Exam  Constitutional:       Appearance: She is well-developed. She is obese.      Comments: Obese female, alert and oriented, in no acute distress. In motorized wheelchair.   Eyes:      Conjunctiva/sclera: Conjunctivae normal.   Neck:      Thyroid: No thyromegaly.   Cardiovascular:      Rate and Rhythm: Normal rate and regular rhythm.      Heart sounds: Murmur heard.   Systolic murmur is present with a grade of 3/6.   No friction rub. No gallop.    Pulmonary:      Effort: No respiratory distress.      Breath sounds: Normal breath sounds.   Abdominal:      General: Bowel sounds are normal. There is no distension.      Palpations: Abdomen is soft.      Tenderness: There is no abdominal tenderness.   Musculoskeletal:         General: Swelling present.      Comments: Trace to 1+ bilateral lower extremity edema   Lymphadenopathy:      Head:      Right side of head: No submandibular adenopathy.      Cervical: No cervical adenopathy.      Upper Body:      Right upper body: No supraclavicular adenopathy.      Left upper body: No supraclavicular adenopathy.   Skin:     General: Skin is warm and dry.      Findings: No bruising or rash.   Neurological:      Mental Status: She is alert and oriented to person, place, and time.      Cranial Nerves: No cranial nerve deficit.      Motor: No abnormal muscle tone.      Deep Tendon Reflexes: Reflexes normal.   Psychiatric:         Behavior: Behavior normal.        Result Review :   Results from last 7 days   Lab Units 05/06/21  1145 05/04/21  0639 05/03/21  0619 04/29/21  1510   WBC 10*3/mm3 5.41 4.10 4.90 8.72   NEUTROS ABS 10*3/mm3 4.16 3.55 4.14 7.76*   LYMPHS ABS 10*3/mm3  --  0.34* 0.41* 0.70   HEMOGLOBIN g/dL 9.7* 8.7* 7.9* 7.3*   HEMATOCRIT % 30.6* 27.0* 24.5* 22.7*   PLATELETS 10*3/mm3 200 217 205 237     Results from last 7 days   Lab Units 05/04/21  0639 05/03/21  0619 05/02/21  0410 04/30/21  0434 " 04/29/21  1510   SODIUM mmol/L 134* 135* 134*  --  132*   POTASSIUM mmol/L 4.2 4.0 3.7  --  4.4   CHLORIDE mmol/L 100 100 101  --  104   CO2 mmol/L 19.3* 21.1* 18.7*  --  15.7*   BUN mg/dL 53* 50* 41*  --  76*   CREATININE mg/dL 6.01* 5.69* 4.17*  --  6.83*   CALCIUM mg/dL 7.9* 7.7* 7.9*  --  8.0*   ALBUMIN g/dL 2.40* 2.10* 2.40*   < > 2.60*   BILIRUBIN mg/dL  --   --   --   --  0.4   ALK PHOS U/L  --   --   --   --  152*   ALT (SGPT) U/L  --   --   --   --  18   AST (SGOT) U/L  --   --   --   --  23   GLUCOSE mg/dL 96 95 104*  --  149*   MAGNESIUM mg/dL 1.8 1.7 1.6  --  1.4*    < > = values in this interval not displayed.                  Assessment and Plan    1. Metastatic lobular breast cancer (ER/TN positive, HER-2/tj negative):  · History of stage IIIC right breast cancer ([id number])  · Patient treated previously in the [facility]  · Diagnosis via excisional biopsy 8/23/2003 with lobular carcinoma, 4.5 cm, positive margins.  ER/TN positive, HER-2/tj negative.  · Staging evaluation in September 2003 with negative bone scan and negative CT scans.  Ejection fraction 65%.  · Received neoadjuvant chemotherapy (? GET regimen) which apparently included Taxotere and possibly epirubicin.  Received 6 cycles.  · 1/22/2004 bilateral mastectomy with right axillary dissection.  Per available records, 10-12 cm residual invasive lobular carcinoma in the breast with 14/16 lymph nodes positive with extranodal extension.  Immediate reconstruction.  · Received adjuvant chemotherapy with carboplatin and navelbine x4 cycles  · Initiated adjuvant tamoxifen 6/22/2004  · Adjuvant radiation therapy initiated but interrupted due to chest wall cellulitis requiring removal of implants in August 2004  · Implant reconstruction again attempted with MRSA infection, implant removed 12/30/2005 with no further attempts made and reconstruction.  · Completed 5 years tamoxifen in June 2009 and subsequently transitioned to Femara.  Received  Femara until June 2014.  · Patient experienced postmenopausal vaginal bleeding in 2013, negative endometrial biopsy and gynecologic evaluation.  · Patient last seen in [facility] 6/18/2014  · CT chest performed to evaluate bicuspid aortic valve and dilated asending aorta 7/12/2019.  CT showed no change in aorta however showed new free intraperitoneal fluid in the upper abdomen with mesenteric edema, concerning for carcinomatosis.  · CT abdomen and pelvis (without IV contrast) on 7/16/2019 with mesenteric thickening, small volume of complex fluid in the mesentery which was suspicious and possibly representative of carcinomatosis, small amount of perihepatic fluid, small bowel loops tethered to omentum without obstruction, omental thickening, shotty retroperitoneal and pelvic lymph nodes (non-pathologically enlarged).  · PET scan 7/26/2019 with hypermetabolic omental activity, hypermetabolic small retroperitoneal lymph nodes, hypermetabolic activity throughout uterus with increase in size.  · CT-guided omental biopsy 7/30/2019 with metastatic lobular carcinoma of breast primary, ER positive (greater than 95%), TX positive (90%), HER-2/tj negative (1+ IHC)  · Baseline CA 15-3 on 7/22/19 was 32.6  · Initiation of Aromasin 25 mg daily 8/12/2019.  Initiated Ibrance at 100 mg 21/28 days on 8/26/2019.  · Improvement in CA 15-3 on 9/17/19-26.3  · Following 2 cycles of Aromasin and Ibrance, CA 15-3 declined to 25.9 on 10/15/2019.  CT scan chest abdomen pelvis 10/16/2019 showed improvement in the mesenteric and omental thickening and near resolution of complex ascites.  Treatment continued.  · Stable findings on subsequent CT chest abdomen pelvis 12/11/2019.  Further improvement in CA 15-3-23.9 on 12/18/2019.  Ibrance/Aromasin continued.   · [facility] liquid biopsy 2/5/2020: MSI undetermined, mutations in BETH, NF1, CHEK2.  No evidence of PIK3CA mutation.  · Slight increase in CA 15-3 to 27.1 on 2/19/2020 and 29.1  on 3/18/2020.  CT however on 3/13/2020 with no new findings.  · Subsequent improvement in CA 15-3-26.5 on 5/15/2020  · CT 7/31/2020 showed evidence of stable disease.  CA 15-3 declined further to 23.1 on 7/24/2020.  · CT 10/19/2020 with stable disease.  Fluctuations in CA 15-3 of unclear significance.  · CA 15-3 on 1/6/2021 decreased to 23.  CT chest abdomen pelvis 1/29/2021 with stable findings.  · Patient admitted to the hospital 4/29/2021 due to acute on chronic kidney disease with creatinine up to 7.  Patient did continue Aromasin however Ibrance was held.  · 5/6/2021: Patient is reviewed back today after discharge from the hospital yesterday.  She reports restarting Ibrance yesterday, 5/5/2021.  Most recent cycle of Ibrance was a shortened cycle of 2 weeks on, 1 week off.  We discussed making 5/5/2021 the start of her next round of therapy and she will therefore take Ibrance for 21 days on, 7 days off and continue Aromasin.  Tolerating well.  2. Anemia secondary to CKD3/4:  · The patient appears to have a chronic anemia with hemoglobin in the 10-11 range with normal MCV.  · Patient has underlying CKD3 with baseline creatinine recently in the 1.5-1.8 range.  · Anemia evaluation 7/22/2019 with iron 30, ferritin 85.2, iron saturation 10%, TIBC 311, B12 370, erythropoietin level 20.4  · Anemia felt in large part to be related to CKD3 as well as to underlying malignancy  · Evidence of folate deficiency 8/12/2019 with level 3.84, initiated folic acid 1 mg daily  · Additional labs on 12/18/2019 with iron 73, ferritin 82.2, iron saturation 25%, TIBC 290.  We did discuss the potential use of Procrit if her hemoglobin declines into the low 9/upper 8 range.  · Decline in hemoglobin into the 8 range, iron studies 7/20/2020 with iron 54, ferritin 66.7, iron saturation 16%, TIBC 328.  On 8/7/2020 proceeded with Injectafer 750 mg IV x1 dose.  Second dose not administered due to onset of fever, subsequent iron studies  precluded further administration of IV iron.  · Initiated Procrit 20,000 units every 4 weeks on 9/4/2020.  · On 1/6/2021, hemoglobin declined significantly to 7.1.  This was repeated and verified at 7.2.  No evidence of GI blood loss, stool cards negative for occult blood x3 on 1/12/2021.  Additional labs on 1/6/2021 with iron 47, ferritin 430, iron saturation 20%, TIBC 235, folate greater than 20, B12 324, haptoglobin 525, .  · Transfused 2 unit PRBC on 1/7/2021  · Change in Procrit dosing to weekly  · Due to low normal B12 level initiated oral B12 1000 mcg daily.  · Patient with symptomatic decline in hemoglobin to 7.7 on 4/1/2021, received 1 unit PRBC transfusion  · Patient with hemoglobin declined to 7.9 along with RYAN/CKD requiring hospitalization 4/29/2021.  Prior to hospitalization we did give her Procrit 60,000 units in the office.  Repeat hemoglobin upon admission 7.3.  Patient receiving 2 units PRBCs.  · Patient returns today, 5/6/2021, hemoglobin up to 9.7.  She will receive Procrit per protocol.  Hope to see some improvement in her hemoglobin in connection with improving renal function on dialysis.  3. RYAN/CKD3/4:  · Baseline creatinine recently in 1.6-2.0 range  · On 9/10/2019, RYAN/CKD3 with creatinine up to 2.44, BUN of 40.  Received 1 L normal saline.  Patient with increase in oral fluid intake.  · Renal ultrasound 9/20/2019 with no evidence of obstructive uropathy.  · Diminished oral intake due to nausea from Ibrance, creatinine 2.79 with BUN 43 on 10/15/2019.  Received 1 L normal saline.  Repeat labs on 10/18/2019 with BUN 31, creatinine 2.0.  · During cycle 3 Ibrance, patient developed increase in creatinine on 11/6/2019 to 2.35 and received 1 L normal saline.  · Patient is trying to maintain adequate oral hydration.    · Gradual escalation of creatinine into the 2-2.4 range and subsequently into the 2.5-2.8 range  · With worsening anemia, creatinine increased into the low 3 range in early  January 2021.  Patient increased oral hydration with improvement back into the mid 2 range.  · Patient admitted for 2921 with acute worsening of renal disease with creatinine up to 7, BUN 78.  Patient did require placement of tunneled catheter and initiation of dialysis.  She is now continuing in the outpatient setting dialysis Tuesday, Thursday, Saturday.  Tolerating well.  4. CHF with mild to moderate aortic stenosis:  · Recent cardiology evaluation with echocardiogram 7/12/2019 showing ejection fraction 48% with moderate dilation of the LV cavity, global hypokinesis, grade 2 diastolic dysfunction, mild to moderate aortic stenosis, trivial pericardial effusion.  · CT chest 7/12/2019 with no change in the aorta compared to prior study 12/13/2017.  5. Left upper and bilateral lower extremity peripheral neuropathy:  · Patient reports that left upper extremity neuropathy was not present from previous chemotherapy but occurred after hospitalization that required intubation and critical care stay.  Apparently felt to represent critical care neuropathy.  Improved over time.  · Bilateral lower extremity neuropathy felt to be related to diabetes  · May have future implications regarding treatment for breast cancer.  6. Uterine/endometrial activity on PET scan:  · Patient experienced postmenopausal vaginal bleeding in 2013, negative endometrial biopsy and gynecologic evaluation.  · With findings on PET scan with enlarging uterus and some hypermetabolic activity, referred back to Dr. Oliveros and gynecology.    · 9/19/2019 D&C with hysteroscopy by Dr. Oliverso, no significant intraoperative findings, pathologic results with benign findings, no evidence of malignancy.  7. Nausea:  · Mild, relieved with Zofran.    8. Myelosuppression secondary to Ibrance:  · With cycle 1, jessica WBC 2.49 with ANC 1.25 and platelet count 140,000.  · With cycle 2, jessica WBC 2.33 with ANC 1.06, maintained normal platelet count  · Today, WBC 5.4.   Patient beginning new round of Ibrance yesterday, 5/5/2021.  9. Hyperkalemia  · Patient with borderline elevated potassium in the past  · 5/4/2021: Potassium 4.2.  10. Depression  · When seen on 3/4/2021, patient indicated increasing symptoms of depression.  She did not wish to discuss this further at the time.  She indicated that she was coping adequately.  She was offered referral to the supportive oncology clinic however declined this.  11. UTI  · Patient developed onset of fatigue, dysuria, urinary frequency around 4/25/2021  · Patient was seen by PCP on 4/27/2021 with urinalysis suggestive of UTI was started on doxycycline empirically.  Urine culture ultimately with no significant growth.  · Renal ultrasound 4/29/2021 while hospitalized showing bilateral renal calculi with right renal calculi creating some dilation.  Patient passing blood clots in the urine.  Medeiros catheter placed.  Patient started on Keflex which she continues in the outpatient setting now.  She will follow up with urology 5/17/2021 with repeat imaging at that time to determine plan of care regarding treatment of renal calculi.         Plan:  1. Continue Aromasin plus Ibrance.  Patient initiated new cycle of Ibrance as outlined above 5/5/2021.  Plan to continue on a 21 out of 28-day cycle.  2. Proceed with Procrit today per protocol.  3. Continue dialysis Tuesday, Thursday, Saturday under direction of nephrology.  4. Continue Keflex as prescribed per urology.  5. Patient will follow up with urology 5/17/2021.  6. Continue weekly CBC with Procrit per protocol.  7. Patient will be seen again formally in 3 weeks by nurse practitioner and in 6 weeks by Dr. Irvin.    I spent 30 minutes caring for Juana Hall on this date of service. This time includes time spent by me in the following activities: preparing for the visit including review of extensive hospital records, reviewing tests, obtaining and/or reviewing a separately obtained history,  performing a medically appropriate examination and/or evaluation , counseling the patient, ordering medications, documenting information in the medical record, independently interpreting results and communicating that information with the patient and care coordination.

## 2021-05-10 ENCOUNTER — TELEPHONE (OUTPATIENT)
Dept: FAMILY MEDICINE CLINIC | Facility: CLINIC | Age: 61
End: 2021-05-10

## 2021-05-10 NOTE — TELEPHONE ENCOUNTER
Caller: Juana Hall    Relationship to patient: Self    Best call back number:[phone]    Patient is needing: PATIENT DOES NOT LIKE THE PROZAC THAT JAZZ HEBERT SWITCHED HER TO. PATIENT STATES SHE FEELS ANXIOUS AND DIZZY. PLEASE CALL PATIENT TO DISCUSS OPTIONS.

## 2021-05-10 NOTE — TELEPHONE ENCOUNTER
Some of her symptoms may be more from weaning off the duloxetine than the  Prozac. Can change her to lexapro 10 mg daily if she does not want to wait until her appt next week

## 2021-05-11 RX ORDER — ESCITALOPRAM OXALATE 10 MG/1
10 TABLET ORAL DAILY
Qty: 30 TABLET | Refills: 0 | Status: SHIPPED | OUTPATIENT
Start: 2021-05-11 | End: 2021-07-08

## 2021-05-12 ENCOUNTER — READMISSION MANAGEMENT (OUTPATIENT)
Dept: CALL CENTER | Facility: HOSPITAL | Age: 61
End: 2021-05-12

## 2021-05-12 NOTE — OUTREACH NOTE
Medical Week 2 Survey      Responses   [facility] patient discharged from?  [place]   Does the patient have one of the following disease processes/diagnoses(primary or secondary)?  Other   Week 2 attempt successful?  Yes   Call start time  1120   Discharge diagnosis  HEMODIALYSIS CATHETER INSERTION,  severe malnutrition   Call end time  1123   Meds reviewed with patient/caregiver?  Yes   Is the patient taking all medications as directed (includes completed medication regime)?  Yes   Has the patient kept scheduled appointments due by today?  Yes   Comments  F/U appts are scheduled  Dialysis 3x week   What is the patient's perception of their health status since discharge?  Improving   Week 2 Call Completed?  Yes          Camilla Parker RN

## 2021-05-13 ENCOUNTER — APPOINTMENT (OUTPATIENT)
Dept: LAB | Facility: HOSPITAL | Age: 61
End: 2021-05-13

## 2021-05-13 ENCOUNTER — APPOINTMENT (OUTPATIENT)
Dept: ONCOLOGY | Facility: HOSPITAL | Age: 61
End: 2021-05-13

## 2021-05-13 ENCOUNTER — TELEPHONE (OUTPATIENT)
Dept: ONCOLOGY | Facility: CLINIC | Age: 61
End: 2021-05-13

## 2021-05-13 NOTE — TELEPHONE ENCOUNTER
Caller: AZAEL ALEXANDER    Relationship to patient: SELF    Best call back number: [phone]    Chief complaint: PT IS CALLING TO CANCEL HER APPTS ON 05/20/21 AND 06/03/21. PT ALSO WANTS TO RESCHEDULE HER APPTS ON 05/27/21 AND 06/10/21 FOR LATER IN THE DAY    Type of visit: LABS, FOLLOW UPS, AND INJECTIONS    Requested date: SAME DATES LATER IN THE DAY    If rescheduling, when is the original appointment: 05/27/21 AND 06/10/21

## 2021-05-20 ENCOUNTER — APPOINTMENT (OUTPATIENT)
Dept: LAB | Facility: HOSPITAL | Age: 61
End: 2021-05-20

## 2021-05-20 ENCOUNTER — APPOINTMENT (OUTPATIENT)
Dept: ONCOLOGY | Facility: HOSPITAL | Age: 61
End: 2021-05-20

## 2021-05-23 ENCOUNTER — READMISSION MANAGEMENT (OUTPATIENT)
Dept: CALL CENTER | Facility: HOSPITAL | Age: 61
End: 2021-05-23

## 2021-05-23 NOTE — OUTREACH NOTE
Medical Week 3 Survey      Responses   [facility] patient discharged from?  [place]   Does the patient have one of the following disease processes/diagnoses(primary or secondary)?  Other   Week 3 attempt successful?  Yes   Call start time  1613   Call end time  1616   Discharge diagnosis  HEMODIALYSIS CATHETER INSERTION,  severe malnutrition   Person spoke with today (if not patient) and relationship  Rk,    Meds reviewed with patient/caregiver?  Yes   Is the patient having any side effects they believe may be caused by any medication additions or changes?  No   Does the patient have all medications ordered at discharge?  Yes   Is the patient taking all medications as directed (includes completed medication regime)?  Yes   Does the patient have a primary care provider?   Yes   Has the patient kept scheduled appointments due by today?  Yes   Has home health visited the patient within 72 hours of discharge?  N/A   Psychosocial issues?  No   Did the patient receive a copy of their discharge instructions?  Yes   Nursing interventions  Reviewed instructions with patient   What is the patient's perception of their health status since discharge?  Improving   Is the patient/caregiver able to teach back signs and symptoms related to disease process for when to call PCP?  Yes   Is the patient/caregiver able to teach back signs and symptoms related to disease process for when to call 911?  Yes   Is the patient/caregiver able to teach back the hierarchy of who to call/visit for symptoms/problems? PCP, Specialist, Home health nurse, Urgent Care, ED, 911  Yes   Additional teach back comments   states pt having returning symptoms of UTI, including hematuria, plans to see MD on 5/24/21, has been drinking fluids min 50 oz/day and ordered by MD   Week 3 Call Completed?  Yes          Fariha Steen RN

## 2021-05-27 ENCOUNTER — APPOINTMENT (OUTPATIENT)
Dept: ONCOLOGY | Facility: HOSPITAL | Age: 61
End: 2021-05-27

## 2021-05-27 ENCOUNTER — OFFICE VISIT (OUTPATIENT)
Dept: ONCOLOGY | Facility: CLINIC | Age: 61
End: 2021-05-27

## 2021-05-27 ENCOUNTER — LAB (OUTPATIENT)
Dept: LAB | Facility: HOSPITAL | Age: 61
End: 2021-05-27

## 2021-05-27 VITALS
TEMPERATURE: 98.6 F | OXYGEN SATURATION: 94 % | HEIGHT: 67 IN | BODY MASS INDEX: 58.03 KG/M2 | RESPIRATION RATE: 16 BRPM | DIASTOLIC BLOOD PRESSURE: 60 MMHG | SYSTOLIC BLOOD PRESSURE: 114 MMHG | HEART RATE: 64 BPM

## 2021-05-27 DIAGNOSIS — N18.30 ANEMIA IN STAGE 3 CHRONIC KIDNEY DISEASE, UNSPECIFIED WHETHER STAGE 3A OR 3B CKD (HCC): ICD-10-CM

## 2021-05-27 DIAGNOSIS — Z79.899 HIGH RISK MEDICATION USE: ICD-10-CM

## 2021-05-27 DIAGNOSIS — C50.919 METASTATIC BREAST CANCER: ICD-10-CM

## 2021-05-27 DIAGNOSIS — N18.32 STAGE 3B CHRONIC KIDNEY DISEASE (HCC): Primary | ICD-10-CM

## 2021-05-27 DIAGNOSIS — D63.1 ANEMIA IN STAGE 3 CHRONIC KIDNEY DISEASE, UNSPECIFIED WHETHER STAGE 3A OR 3B CKD (HCC): ICD-10-CM

## 2021-05-27 LAB
ABO GROUP BLD: NORMAL
ALBUMIN SERPL-MCNC: 2.5 G/DL (ref 3.5–5.2)
ALBUMIN/GLOB SERPL: 0.6 G/DL (ref 1.1–2.4)
ALP SERPL-CCNC: 128 U/L (ref 38–116)
ALT SERPL W P-5'-P-CCNC: 17 U/L (ref 0–33)
ANION GAP SERPL CALCULATED.3IONS-SCNC: 10.7 MMOL/L (ref 5–15)
AST SERPL-CCNC: 16 U/L (ref 0–32)
BASOPHILS # BLD AUTO: 0.06 10*3/MM3 (ref 0–0.2)
BASOPHILS NFR BLD AUTO: 0.9 % (ref 0–1.5)
BILIRUB SERPL-MCNC: 0.5 MG/DL (ref 0.2–1.2)
BLD GP AB SCN SERPL QL: NEGATIVE
BUN SERPL-MCNC: 13 MG/DL (ref 6–20)
BUN/CREAT SERPL: 4.2 (ref 7.3–30)
CALCIUM SPEC-SCNC: 7.9 MG/DL (ref 8.5–10.2)
CANCER AG15-3 SERPL-ACNC: 27.8 U/ML
CHLORIDE SERPL-SCNC: 98 MMOL/L (ref 98–107)
CO2 SERPL-SCNC: 24.3 MMOL/L (ref 22–29)
CREAT SERPL-MCNC: 3.13 MG/DL (ref 0.6–1.1)
DEPRECATED RDW RBC AUTO: 71.3 FL (ref 37–54)
EOSINOPHIL # BLD AUTO: 0.06 10*3/MM3 (ref 0–0.4)
EOSINOPHIL NFR BLD AUTO: 0.9 % (ref 0.3–6.2)
ERYTHROCYTE [DISTWIDTH] IN BLOOD BY AUTOMATED COUNT: 19.2 % (ref 12.3–15.4)
GFR SERPL CREATININE-BSD FRML MDRD: 15 ML/MIN/1.73
GLOBULIN UR ELPH-MCNC: 4.4 GM/DL (ref 1.8–3.5)
GLUCOSE SERPL-MCNC: 158 MG/DL (ref 74–124)
HCT VFR BLD AUTO: 22.8 % (ref 34–46.6)
HGB BLD-MCNC: 7.2 G/DL (ref 12–15.9)
IMM GRANULOCYTES # BLD AUTO: 0.08 10*3/MM3 (ref 0–0.05)
IMM GRANULOCYTES NFR BLD AUTO: 1.1 % (ref 0–0.5)
LYMPHOCYTES # BLD AUTO: 0.59 10*3/MM3 (ref 0.7–3.1)
LYMPHOCYTES NFR BLD AUTO: 8.4 % (ref 19.6–45.3)
MCH RBC QN AUTO: 32.3 PG (ref 26.6–33)
MCHC RBC AUTO-ENTMCNC: 31.6 G/DL (ref 31.5–35.7)
MCV RBC AUTO: 102.2 FL (ref 79–97)
MONOCYTES # BLD AUTO: 0.4 10*3/MM3 (ref 0.1–0.9)
MONOCYTES NFR BLD AUTO: 5.7 % (ref 5–12)
NEUTROPHILS NFR BLD AUTO: 5.86 10*3/MM3 (ref 1.7–7)
NEUTROPHILS NFR BLD AUTO: 83 % (ref 42.7–76)
NRBC BLD AUTO-RTO: 0 /100 WBC (ref 0–0.2)
PLATELET # BLD AUTO: 264 10*3/MM3 (ref 140–450)
PMV BLD AUTO: 10.4 FL (ref 6–12)
POTASSIUM SERPL-SCNC: 3.3 MMOL/L (ref 3.5–4.7)
PROT SERPL-MCNC: 6.9 G/DL (ref 6.3–8)
RBC # BLD AUTO: 2.23 10*6/MM3 (ref 3.77–5.28)
RH BLD: POSITIVE
SODIUM SERPL-SCNC: 133 MMOL/L (ref 134–145)
T&S EXPIRATION DATE: NORMAL
WBC # BLD AUTO: 7.05 10*3/MM3 (ref 3.4–10.8)

## 2021-05-27 PROCEDURE — 99214 OFFICE O/P EST MOD 30 MIN: CPT | Performed by: NURSE PRACTITIONER

## 2021-05-27 PROCEDURE — 86900 BLOOD TYPING SEROLOGIC ABO: CPT | Performed by: NURSE PRACTITIONER

## 2021-05-27 PROCEDURE — 36415 COLL VENOUS BLD VENIPUNCTURE: CPT

## 2021-05-27 PROCEDURE — 86923 COMPATIBILITY TEST ELECTRIC: CPT

## 2021-05-27 PROCEDURE — 86901 BLOOD TYPING SEROLOGIC RH(D): CPT | Performed by: NURSE PRACTITIONER

## 2021-05-27 PROCEDURE — 85025 COMPLETE CBC W/AUTO DIFF WBC: CPT

## 2021-05-27 PROCEDURE — 80053 COMPREHEN METABOLIC PANEL: CPT

## 2021-05-27 PROCEDURE — 86850 RBC ANTIBODY SCREEN: CPT | Performed by: NURSE PRACTITIONER

## 2021-05-27 PROCEDURE — 86300 IMMUNOASSAY TUMOR CA 15-3: CPT | Performed by: INTERNAL MEDICINE

## 2021-05-27 RX ORDER — ACETAMINOPHEN 325 MG/1
650 TABLET ORAL ONCE
Status: CANCELLED | OUTPATIENT
Start: 2021-05-28 | End: 2021-05-27

## 2021-05-27 RX ORDER — SODIUM CHLORIDE 9 MG/ML
250 INJECTION, SOLUTION INTRAVENOUS AS NEEDED
Status: CANCELLED | OUTPATIENT
Start: 2021-05-28

## 2021-05-27 RX ORDER — DIPHENHYDRAMINE HCL 25 MG
25 CAPSULE ORAL ONCE
Status: CANCELLED | OUTPATIENT
Start: 2021-05-28 | End: 2021-05-27

## 2021-05-27 NOTE — PROGRESS NOTES
"Chief Complaint  Metastatic lobular breast cancer (ER/MN positive, HER-2/tj negative), anemia secondary to CKD3, CHF, peripheral neuropathy, chemotherapy related nausea chemotherapy-induced myelosuppression    Subjective        History of Present Illness  The patient returns today in 3-week follow-up. Patient was recently hospitalized 4/29 to 5/4/2021. Just prior to admission patient was seen in our office that day receiving Procrit for hemoglobin at that time of 7.9 but her creatinine was found to be elevated at 7, with acute on chronic kidney disease required admission.  She received 2 units PRBCs.  Patient did require placement of tunneled catheter and initiation of dialysis.    It was our recommendation that the patient continue on Aromasin while hospitalized however we did hold Ibrance.    I saw her back in follow-up 5/6/2021 at which time she resumed Ibrance. We also continued Procrit at a dose of 60,000 units.    Patient has continued with dialysis and tells me today that dialysis actually took over giving her Epogen and that is why she has not been back to our office for additional Procrit. Unfortunately her hemoglobin is 7.2 today. She is very pale and feeling quite fatigued. She does not want to be admitted.    On a positive note her creatinine has improved with dialysis, currently 3.1, BUN 13.    She denies other concerns at this time.    Objective   Vital Signs:   /60   Pulse 64   Temp 98.6 °F (37 °C) (Skin)   Resp 16   Ht 170.2 cm (67.01\")   SpO2 94%   BMI 58.03 kg/m²     Physical Exam  Constitutional:       Appearance: She is well-developed. She is obese.      Comments: Obese female, alert and oriented, in no acute distress. In motorized wheelchair.   Eyes:      Conjunctiva/sclera: Conjunctivae normal.   Neck:      Thyroid: No thyromegaly.   Cardiovascular:      Rate and Rhythm: Normal rate and regular rhythm.      Heart sounds: Murmur heard.   Systolic murmur is present with a grade of " 3/6.   No friction rub. No gallop.    Pulmonary:      Effort: No respiratory distress.      Breath sounds: Normal breath sounds.   Abdominal:      General: Bowel sounds are normal. There is no distension.      Palpations: Abdomen is soft.      Tenderness: There is no abdominal tenderness.   Musculoskeletal:         General: Swelling present.      Comments: Trace to 1+ bilateral lower extremity edema   Lymphadenopathy:      Head:      Right side of head: No submandibular adenopathy.      Cervical: No cervical adenopathy.      Upper Body:      Right upper body: No supraclavicular adenopathy.      Left upper body: No supraclavicular adenopathy.   Skin:     General: Skin is warm and dry.      Findings: No bruising or rash.   Neurological:      Mental Status: She is alert and oriented to person, place, and time.      Cranial Nerves: No cranial nerve deficit.      Motor: No abnormal muscle tone.      Deep Tendon Reflexes: Reflexes normal.   Psychiatric:         Behavior: Behavior normal.        Result Review :   Results from last 7 days   Lab Units 05/27/21  1524   WBC 10*3/mm3 7.05   NEUTROS ABS 10*3/mm3 5.86   HEMOGLOBIN g/dL 7.2*   HEMATOCRIT % 22.8*   PLATELETS 10*3/mm3 264     Results from last 7 days   Lab Units 05/27/21  1524   SODIUM mmol/L 133*   POTASSIUM mmol/L 3.3*   CHLORIDE mmol/L 98   CO2 mmol/L 24.3   BUN mg/dL 13   CREATININE mg/dL 3.13*   CALCIUM mg/dL 7.9*   ALBUMIN g/dL 2.50*   BILIRUBIN mg/dL 0.5   ALK PHOS U/L 128*   ALT (SGPT) U/L 17   AST (SGOT) U/L 16   GLUCOSE mg/dL 158*                  Assessment and Plan    1. Metastatic lobular breast cancer (ER/[place] positive, HER-2/tj negative):  · History of stage IIIC right breast cancer ([id number])  · Patient treated previously in the [facility]  · Diagnosis via excisional biopsy 8/23/2003 with lobular carcinoma, 4.5 cm, positive margins.  ER/WI positive, HER-2/tj negative.  · Staging evaluation in September 2003 with negative bone scan and negative  CT scans.  Ejection fraction 65%.  · Received neoadjuvant chemotherapy (? GET regimen) which apparently included Taxotere and possibly epirubicin.  Received 6 cycles.  · 1/22/2004 bilateral mastectomy with right axillary dissection.  Per available records, 10-12 cm residual invasive lobular carcinoma in the breast with 14/16 lymph nodes positive with extranodal extension.  Immediate reconstruction.  · Received adjuvant chemotherapy with carboplatin and navelbine x4 cycles  · Initiated adjuvant tamoxifen 6/22/2004  · Adjuvant radiation therapy initiated but interrupted due to chest wall cellulitis requiring removal of implants in August 2004  · Implant reconstruction again attempted with MRSA infection, implant removed 12/30/2005 with no further attempts made and reconstruction.  · Completed 5 years tamoxifen in June 2009 and subsequently transitioned to Femara.  Received Femara until June 2014.  · Patient experienced postmenopausal vaginal bleeding in 2013, negative endometrial biopsy and gynecologic evaluation.  · Patient last seen in [facility] 6/18/2014  · CT chest performed to evaluate bicuspid aortic valve and dilated asending aorta 7/12/2019.  CT showed no change in aorta however showed new free intraperitoneal fluid in the upper abdomen with mesenteric edema, concerning for carcinomatosis.  · CT abdomen and pelvis (without IV contrast) on 7/16/2019 with mesenteric thickening, small volume of complex fluid in the mesentery which was suspicious and possibly representative of carcinomatosis, small amount of perihepatic fluid, small bowel loops tethered to omentum without obstruction, omental thickening, shotty retroperitoneal and pelvic lymph nodes (non-pathologically enlarged).  · PET scan 7/26/2019 with hypermetabolic omental activity, hypermetabolic small retroperitoneal lymph nodes, hypermetabolic activity throughout uterus with increase in size.  · CT-guided omental biopsy 7/30/2019 with metastatic  lobular carcinoma of breast primary, ER positive (greater than 95%), NE positive (90%), HER-2/tj negative (1+ IHC)  · Baseline CA 15-3 on 7/22/19 was 32.6  · Initiation of Aromasin 25 mg daily 8/12/2019.  Initiated Ibrance at 100 mg 21/28 days on 8/26/2019.  · Improvement in CA 15-3 on 9/17/19-26.3  · Following 2 cycles of Aromasin and Ibrance, CA 15-3 declined to 25.9 on 10/15/2019.  CT scan chest abdomen pelvis 10/16/2019 showed improvement in the mesenteric and omental thickening and near resolution of complex ascites.  Treatment continued.  · Stable findings on subsequent CT chest abdomen pelvis 12/11/2019.  Further improvement in CA 15-3-23.9 on 12/18/2019.  Ibrance/Aromasin continued.   · [facility] medicine liquid biopsy 2/5/2020: MSI undetermined, mutations in BETH, NF1, CHEK2.  No evidence of PIK3CA mutation.  · Slight increase in CA 15-3 to 27.1 on 2/19/2020 and 29.1 on 3/18/2020.  CT however on 3/13/2020 with no new findings.  · Subsequent improvement in CA 15-3-26.5 on 5/15/2020  · CT 7/31/2020 showed evidence of stable disease.  CA 15-3 declined further to 23.1 on 7/24/2020.  · CT 10/19/2020 with stable disease.  Fluctuations in CA 15-3 of unclear significance.  · CA 15-3 on 1/6/2021 decreased to 23.  CT chest abdomen pelvis 1/29/2021 with stable findings.  · Patient admitted to the hospital 4/29/2021 due to acute on chronic kidney disease with creatinine up to 7.  Patient did continue Aromasin however Ibrance was held.  · 5/6/2021: Patient restarting Ibrance 5/5/2021.  Most recent cycle of Ibrance was a shortened cycle of 2 weeks on, 1 week off. Continuing Aromasin.  Tolerating well.  · Patient currently on her off week of Ibrance.  2. Anemia secondary to CKD3/4:  · The patient appears to have a chronic anemia with hemoglobin in the 10-11 range with normal MCV.  · Patient has underlying CKD3 with baseline creatinine recently in the 1.5-1.8 range.  · Anemia evaluation 7/22/2019 with iron 30, ferritin  85.2, iron saturation 10%, TIBC 311, B12 370, erythropoietin level 20.4  · Anemia felt in large part to be related to CKD3 as well as to underlying malignancy  · Evidence of folate deficiency 8/12/2019 with level 3.84, initiated folic acid 1 mg daily  · Additional labs on 12/18/2019 with iron 73, ferritin 82.2, iron saturation 25%, TIBC 290.  We did discuss the potential use of Procrit if her hemoglobin declines into the low 9/upper 8 range.  · Decline in hemoglobin into the 8 range, iron studies 7/20/2020 with iron 54, ferritin 66.7, iron saturation 16%, TIBC 328.  On 8/7/2020 proceeded with Injectafer 750 mg IV x1 dose.  Second dose not administered due to onset of fever, subsequent iron studies precluded further administration of IV iron.  · Initiated Procrit 20,000 units every 4 weeks on 9/4/2020.  · On 1/6/2021, hemoglobin declined significantly to 7.1.  This was repeated and verified at 7.2.  No evidence of GI blood loss, stool cards negative for occult blood x3 on 1/12/2021.  Additional labs on 1/6/2021 with iron 47, ferritin 430, iron saturation 20%, TIBC 235, folate greater than 20, B12 324, haptoglobin 525, .  · Transfused 2 unit PRBC on 1/7/2021  · Change in Procrit dosing to weekly  · Due to low normal B12 level initiated oral B12 1000 mcg daily.  · Patient with symptomatic decline in hemoglobin to 7.7 on 4/1/2021, received 1 unit PRBC transfusion  · Patient with hemoglobin declined to 7.9 along with RYAN/CKD requiring hospitalization 4/29/2021.  Prior to hospitalization we did give her Procrit 60,000 units in the office.  Repeat hemoglobin upon admission 7.3.  Patient receiving 2 units PRBCs.  · 5/6/2021: hemoglobin up to 9.7. Patient receiving Procrit 60,000 units.   · 5/27/2021: Patient reviewed back today, hemoglobin 7.2. She reports that she has been given Epogen through dialysis at [facility]. We will attempt to get these records. She has had no further Procrit in our office since  5/6/2021. She will require transfusion now for symptomatic anemia.   3. RYAN/CKD3/4:  · Baseline creatinine recently in 1.6-2.0 range  · On 9/10/2019, RYAN/CKD3 with creatinine up to 2.44, BUN of 40.  Received 1 L normal saline.  Patient with increase in oral fluid intake.  · Renal ultrasound 9/20/2019 with no evidence of obstructive uropathy.  · Diminished oral intake due to nausea from Ibrance, creatinine 2.79 with BUN 43 on 10/15/2019.  Received 1 L normal saline.  Repeat labs on 10/18/2019 with BUN 31, creatinine 2.0.  · During cycle 3 Ibrance, patient developed increase in creatinine on 11/6/2019 to 2.35 and received 1 L normal saline.  · Patient is trying to maintain adequate oral hydration.    · Gradual escalation of creatinine into the 2-2.4 range and subsequently into the 2.5-2.8 range  · With worsening anemia, creatinine increased into the low 3 range in early January 2021.  Patient increased oral hydration with improvement back into the mid 2 range.  · Patient admitted 4/29/2021 with acute worsening of renal disease with creatinine up to 7, BUN 78.  Patient did require placement of tunneled catheter and initiation of dialysis.  She is now continuing in the outpatient setting dialysis Tuesday, Thursday, Saturday.  Tolerating well.  · Creatinine overall improved at 3.1, BUN 13.  4. CHF with mild to moderate aortic stenosis:  · Recent cardiology evaluation with echocardiogram 7/12/2019 showing ejection fraction 48% with moderate dilation of the LV cavity, global hypokinesis, grade 2 diastolic dysfunction, mild to moderate aortic stenosis, trivial pericardial effusion.  · CT chest 7/12/2019 with no change in the aorta compared to prior study 12/13/2017.  5. Left upper and bilateral lower extremity peripheral neuropathy:  · Patient reports that left upper extremity neuropathy was not present from previous chemotherapy but occurred after hospitalization that required intubation and critical care stay.  Apparently  felt to represent critical care neuropathy.  Improved over time.  · Bilateral lower extremity neuropathy felt to be related to diabetes  · May have future implications regarding treatment for breast cancer.  6. Uterine/endometrial activity on PET scan:  · Patient experienced postmenopausal vaginal bleeding in 2013, negative endometrial biopsy and gynecologic evaluation.  · With findings on PET scan with enlarging uterus and some hypermetabolic activity, referred back to Dr. Oliveros and gynecology.    · 9/19/2019 D&C with hysteroscopy by Dr. Oliveros, no significant intraoperative findings, pathologic results with benign findings, no evidence of malignancy.  7. Nausea:  · Mild, relieved with Zofran.    · Not a concern today.  8. Myelosuppression secondary to Ibrance:  · With cycle 1, jessica WBC 2.49 with ANC 1.25 and platelet count 140,000.  · With cycle 2, jessica WBC 2.33 with ANC 1.06, maintained normal platelet count  · Today, WBC 7.05. Patient currently on her off week of Ibrance. Due to begin her next round 6/2/2021.   9. Hyperkalemia  · Patient with borderline elevated potassium in the past  · 5/27/2021: Potassium actually slightly low at 3.3. Monitor.  10. Depression  · When seen on 3/4/2021, patient indicated increasing symptoms of depression.  She did not wish to discuss this further at the time.  She indicated that she was coping adequately.  She was offered referral to the supportive oncology clinic however declined this.  11. Recent history of UTI  · Patient developed onset of fatigue, dysuria, urinary frequency around 4/25/2021  · Patient was seen by PCP on 4/27/2021 with urinalysis suggestive of UTI was started on doxycycline empirically.  Urine culture ultimately with no significant growth.  · Renal ultrasound 4/29/2021 while hospitalized showing bilateral renal calculi with right renal calculi creating some dilation.  Patient passing blood clots in the urine.  Medeiros catheter placed.  Patient started on  Keflex which she continues in the outpatient setting now. Patient following up with urology 5/17/2021 with repeat imaging to determine plan of care regarding treatment of renal calculi.       Plan:  1. Type and cross to be drawn today in preparation for 2 units PRBCs to be given tomorrow, 5/28/2021.  2. Patient reports that she is receiving Epogen through dialysis now and therefore we will not give Procrit. We will attempt to get records to confirm this however.  3. Continue Aromasin plus Ibrance. Patient will be due to begin next cycle of Ibrance 6/2/2021.   4. Continue dialysis Tuesday, Thursday, Saturday under direction of nephrology.  5. Patient will be seen again formally in 3 weeks by Dr. Irvin.     This patient is on drug therapy requiring intensive monitoring for toxicity.  We did more than just assess side effects of treatment today. We discussed worsening anemia in the setting of CKD and the need for transfusion. We are also attempting to get records from her dialysis clinic regarding the use of Epogen.    ADDENDUM:  Per staff at [facility], they are giving patient Epo 8000 units 3 times weekly with dialysis, initiated 5/13/2021.

## 2021-05-28 ENCOUNTER — TELEPHONE (OUTPATIENT)
Dept: ONCOLOGY | Facility: CLINIC | Age: 61
End: 2021-05-28

## 2021-05-28 ENCOUNTER — HOSPITAL ENCOUNTER (OUTPATIENT)
Dept: INFUSION THERAPY | Facility: HOSPITAL | Age: 61
Setting detail: INFUSION SERIES
Discharge: HOME | End: 2021-05-28

## 2021-05-28 VITALS
HEART RATE: 54 BPM | RESPIRATION RATE: 20 BRPM | OXYGEN SATURATION: 99 % | SYSTOLIC BLOOD PRESSURE: 116 MMHG | TEMPERATURE: 97.1 F | DIASTOLIC BLOOD PRESSURE: 49 MMHG

## 2021-05-28 DIAGNOSIS — C50.919 METASTATIC BREAST CANCER: ICD-10-CM

## 2021-05-28 DIAGNOSIS — N18.32 STAGE 3B CHRONIC KIDNEY DISEASE (HCC): ICD-10-CM

## 2021-05-28 DIAGNOSIS — D63.1 ANEMIA IN STAGE 3 CHRONIC KIDNEY DISEASE, UNSPECIFIED WHETHER STAGE 3A OR 3B CKD (HCC): ICD-10-CM

## 2021-05-28 DIAGNOSIS — N18.30 ANEMIA IN STAGE 3 CHRONIC KIDNEY DISEASE, UNSPECIFIED WHETHER STAGE 3A OR 3B CKD (HCC): ICD-10-CM

## 2021-05-28 PROCEDURE — 86900 BLOOD TYPING SEROLOGIC ABO: CPT

## 2021-05-28 PROCEDURE — 63710000001 DIPHENHYDRAMINE PER 50 MG: Performed by: NURSE PRACTITIONER

## 2021-05-28 PROCEDURE — P9016 RBC LEUKOCYTES REDUCED: HCPCS

## 2021-05-28 PROCEDURE — 36430 TRANSFUSION BLD/BLD COMPNT: CPT

## 2021-05-28 RX ORDER — SODIUM CHLORIDE 9 MG/ML
250 INJECTION, SOLUTION INTRAVENOUS AS NEEDED
Status: DISCONTINUED | OUTPATIENT
Start: 2021-05-28 | End: 2021-05-30 | Stop reason: HOSPADM

## 2021-05-28 RX ORDER — ACETAMINOPHEN 325 MG/1
650 TABLET ORAL ONCE
Status: COMPLETED | OUTPATIENT
Start: 2021-05-28 | End: 2021-05-28

## 2021-05-28 RX ORDER — DIPHENHYDRAMINE HCL 25 MG
25 CAPSULE ORAL ONCE
Status: COMPLETED | OUTPATIENT
Start: 2021-05-28 | End: 2021-05-28

## 2021-05-28 RX ADMIN — ACETAMINOPHEN 650 MG: 325 TABLET, FILM COATED ORAL at 08:22

## 2021-05-28 RX ADMIN — DIPHENHYDRAMINE HYDROCHLORIDE 25 MG: 25 CAPSULE ORAL at 08:23

## 2021-05-28 NOTE — TELEPHONE ENCOUNTER
Called [facility] ([address] [phone]) They confirmed pt stated receiving Procrit 8000 units 3 times weekly. Message sent back to inform BETY Farah of this.   ----- Message from BETY Kerr sent at 5/27/2021  4:01 PM EDT -----  Regarding: Epogen through [facility]?  Patient thinks she is now getting Epogen through [facility] dialysis services.  Could you look into this place just for documentation purposes?  Thank you.

## 2021-05-29 LAB
BH BB BLOOD EXPIRATION DATE: NORMAL
BH BB BLOOD EXPIRATION DATE: NORMAL
BH BB BLOOD TYPE BARCODE: 6200
BH BB BLOOD TYPE BARCODE: 6200
BH BB DISPENSE STATUS: NORMAL
BH BB DISPENSE STATUS: NORMAL
BH BB PRODUCT CODE: NORMAL
BH BB PRODUCT CODE: NORMAL
BH BB UNIT NUMBER: NORMAL
BH BB UNIT NUMBER: NORMAL
CROSSMATCH INTERPRETATION: NORMAL
CROSSMATCH INTERPRETATION: NORMAL
UNIT  ABO: NORMAL
UNIT  ABO: NORMAL
UNIT  RH: NORMAL
UNIT  RH: NORMAL

## 2021-06-09 ENCOUNTER — OFFICE VISIT (OUTPATIENT)
Dept: ONCOLOGY | Facility: CLINIC | Age: 61
End: 2021-06-09

## 2021-06-09 ENCOUNTER — LAB (OUTPATIENT)
Dept: LAB | Facility: HOSPITAL | Age: 61
End: 2021-06-09

## 2021-06-09 VITALS
OXYGEN SATURATION: 95 % | DIASTOLIC BLOOD PRESSURE: 61 MMHG | HEIGHT: 67 IN | BODY MASS INDEX: 45.99 KG/M2 | HEART RATE: 78 BPM | SYSTOLIC BLOOD PRESSURE: 139 MMHG | WEIGHT: 293 LBS | RESPIRATION RATE: 18 BRPM | TEMPERATURE: 97.3 F

## 2021-06-09 DIAGNOSIS — C50.919 METASTATIC BREAST CANCER: Primary | ICD-10-CM

## 2021-06-09 DIAGNOSIS — D63.1 ANEMIA IN STAGE 3 CHRONIC KIDNEY DISEASE, UNSPECIFIED WHETHER STAGE 3A OR 3B CKD (HCC): Primary | ICD-10-CM

## 2021-06-09 DIAGNOSIS — N18.30 ANEMIA IN STAGE 3 CHRONIC KIDNEY DISEASE, UNSPECIFIED WHETHER STAGE 3A OR 3B CKD (HCC): Primary | ICD-10-CM

## 2021-06-09 LAB
ALBUMIN SERPL-MCNC: 2.9 G/DL (ref 3.5–5.2)
ALBUMIN/GLOB SERPL: 0.7 G/DL (ref 1.1–2.4)
ALP SERPL-CCNC: 80 U/L (ref 38–116)
ALT SERPL W P-5'-P-CCNC: 5 U/L (ref 0–33)
ANION GAP SERPL CALCULATED.3IONS-SCNC: 10.7 MMOL/L (ref 5–15)
AST SERPL-CCNC: 11 U/L (ref 0–32)
BASOPHILS # BLD AUTO: 0.09 10*3/MM3 (ref 0–0.2)
BASOPHILS NFR BLD AUTO: 2.7 % (ref 0–1.5)
BILIRUB SERPL-MCNC: 0.4 MG/DL (ref 0.2–1.2)
BUN SERPL-MCNC: 24 MG/DL (ref 6–20)
BUN/CREAT SERPL: 5.5 (ref 7.3–30)
CALCIUM SPEC-SCNC: 8.3 MG/DL (ref 8.5–10.2)
CHLORIDE SERPL-SCNC: 98 MMOL/L (ref 98–107)
CO2 SERPL-SCNC: 23.3 MMOL/L (ref 22–29)
CREAT SERPL-MCNC: 4.34 MG/DL (ref 0.6–1.1)
DEPRECATED RDW RBC AUTO: 77.2 FL (ref 37–54)
EOSINOPHIL # BLD AUTO: 0.07 10*3/MM3 (ref 0–0.4)
EOSINOPHIL NFR BLD AUTO: 2.1 % (ref 0.3–6.2)
ERYTHROCYTE [DISTWIDTH] IN BLOOD BY AUTOMATED COUNT: 20.3 % (ref 12.3–15.4)
GFR SERPL CREATININE-BSD FRML MDRD: 10 ML/MIN/1.73
GFR SERPL CREATININE-BSD FRML MDRD: ABNORMAL ML/MIN/{1.73_M2}
GLOBULIN UR ELPH-MCNC: 4.2 GM/DL (ref 1.8–3.5)
GLUCOSE SERPL-MCNC: 191 MG/DL (ref 74–124)
HCT VFR BLD AUTO: 26.4 % (ref 34–46.6)
HGB BLD-MCNC: 7.9 G/DL (ref 12–15.9)
IMM GRANULOCYTES # BLD AUTO: 0.03 10*3/MM3 (ref 0–0.05)
IMM GRANULOCYTES NFR BLD AUTO: 0.9 % (ref 0–0.5)
LYMPHOCYTES # BLD AUTO: 0.7 10*3/MM3 (ref 0.7–3.1)
LYMPHOCYTES NFR BLD AUTO: 21.2 % (ref 19.6–45.3)
MCH RBC QN AUTO: 31.9 PG (ref 26.6–33)
MCHC RBC AUTO-ENTMCNC: 29.9 G/DL (ref 31.5–35.7)
MCV RBC AUTO: 106.5 FL (ref 79–97)
MONOCYTES # BLD AUTO: 0.16 10*3/MM3 (ref 0.1–0.9)
MONOCYTES NFR BLD AUTO: 4.8 % (ref 5–12)
NEUTROPHILS NFR BLD AUTO: 2.25 10*3/MM3 (ref 1.7–7)
NEUTROPHILS NFR BLD AUTO: 68.3 % (ref 42.7–76)
NRBC BLD AUTO-RTO: 0 /100 WBC (ref 0–0.2)
PLATELET # BLD AUTO: 356 10*3/MM3 (ref 140–450)
PMV BLD AUTO: 8.8 FL (ref 6–12)
POTASSIUM SERPL-SCNC: 4.9 MMOL/L (ref 3.5–4.7)
PROT SERPL-MCNC: 7.1 G/DL (ref 6.3–8)
RBC # BLD AUTO: 2.48 10*6/MM3 (ref 3.77–5.28)
SODIUM SERPL-SCNC: 132 MMOL/L (ref 134–145)
WBC # BLD AUTO: 3.3 10*3/MM3 (ref 3.4–10.8)

## 2021-06-09 PROCEDURE — 99214 OFFICE O/P EST MOD 30 MIN: CPT | Performed by: INTERNAL MEDICINE

## 2021-06-09 PROCEDURE — 85025 COMPLETE CBC W/AUTO DIFF WBC: CPT

## 2021-06-09 PROCEDURE — 80053 COMPREHEN METABOLIC PANEL: CPT

## 2021-06-09 PROCEDURE — 36415 COLL VENOUS BLD VENIPUNCTURE: CPT

## 2021-06-09 NOTE — PROGRESS NOTES
"Chief Complaint  Metastatic lobular breast cancer (ER/[place] positive, HER-2/tj negative), anemia secondary to CKD3, CHF, peripheral neuropathy, chemotherapy related nausea chemotherapy-induced myelosuppression    Subjective        History of Present Illness  The patient returns today in follow-up with laboratory studies to review, continuing on Aromasin 25 mg daily and Ibrance 100 mg daily for 21/28 days.      She was hospitalized with ARF/CKD 4/29-5/4/2021. She had E. Coli and Klebsiella UTI as well. She received 2 unit PRBC transfusion. Hemodialysis was initiated during the hospitalization.     The patient started a new cycle of Ibrance on 5/24/2021.  She did have a hemoglobin down to 7.2 on 5/27/2021 and received 2 unit PRBC transfusion.  The patient has been continuing on hemodialysis 3 days per week on Tuesday, Thursday, and Saturday. Nephrology is now managing her anemia and she is receiving epogen with each dialysis. She reports that she feels better now than she has in some time with improvement in appetite, nausea. She does have some ongoing fatigue. She required replacement of her dialysis catheter last week.       Objective   Vital Signs:   /61   Pulse 78   Temp 97.3 °F (36.3 °C) (Temporal)   Resp 18   Ht 170.2 cm (67.01\")   Wt (!) 171 kg (375 lb 14.4 oz)   SpO2 95%   BMI 58.86 kg/m²     Physical Exam  Constitutional:       Appearance: She is well-developed. She is obese.      Comments: Patient is ambulating with the use of a walker today   Eyes:      Conjunctiva/sclera: Conjunctivae normal.   Neck:      Thyroid: No thyromegaly.   Cardiovascular:      Rate and Rhythm: Normal rate and regular rhythm.      Heart sounds: Murmur heard.   No friction rub. No gallop.       Comments: 2/6 murmur  Pulmonary:      Effort: No respiratory distress.      Breath sounds: Normal breath sounds.   Abdominal:      General: Bowel sounds are normal. There is no distension.      Palpations: Abdomen is soft.      " Tenderness: There is no abdominal tenderness.   Musculoskeletal:         General: Swelling present.      Comments: Trace to 1+ bilateral lower extremity edema, improved from prior exam   Lymphadenopathy:      Head:      Right side of head: No submandibular adenopathy.      Cervical: No cervical adenopathy.      Upper Body:      Right upper body: No supraclavicular adenopathy.      Left upper body: No supraclavicular adenopathy.   Skin:     General: Skin is warm and dry.      Findings: No bruising or rash.   Neurological:      Mental Status: She is alert and oriented to person, place, and time.      Cranial Nerves: No cranial nerve deficit.      Motor: No abnormal muscle tone.      Deep Tendon Reflexes: Reflexes normal.   Psychiatric:         Behavior: Behavior normal.        Result Review : Reviewed CBC, CMP from today.  Reviewed hospital records including laboratory results, progress notes, discharge summary.       Assessment and Plan    1. Metastatic lobular breast cancer (ER/[place] positive, HER-2/tj negative):  · History of stage IIIC right breast cancer ([id number])  · Patient treated previously in the [facility] system  · Diagnosis via excisional biopsy 8/23/2003 with lobular carcinoma, 4.5 cm, positive margins.  ER/[place] positive, HER-2/tj negative.  · Staging evaluation in September 2003 with negative bone scan and negative CT scans.  Ejection fraction 65%.  · Received neoadjuvant chemotherapy (? GET regimen) which apparently included Taxotere and possibly epirubicin.  Received 6 cycles.  · 1/22/2004 bilateral mastectomy with right axillary dissection.  Per available records, 10-12 cm residual invasive lobular carcinoma in the breast with 14/16 lymph nodes positive with extranodal extension.  Immediate reconstruction.  · Received adjuvant chemotherapy with carboplatin and navelbine x4 cycles  · Initiated adjuvant tamoxifen 6/22/2004  · Adjuvant radiation therapy initiated but interrupted due to chest wall cellulitis  requiring removal of implants in August 2004  · Implant reconstruction again attempted with MRSA infection, implant removed 12/30/2005 with no further attempts made and reconstruction.  · Completed 5 years tamoxifen in June 2009 and subsequently transitioned to Femara.  Received Femara until June 2014.  · Patient experienced postmenopausal vaginal bleeding in 2013, negative endometrial biopsy and gynecologic evaluation.  · Patient last seen in [facility] 6/18/2014  · CT chest performed to evaluate bicuspid aortic valve and dilated asending aorta 7/12/2019.  CT showed no change in aorta however showed new free intraperitoneal fluid in the upper abdomen with mesenteric edema, concerning for carcinomatosis.  · CT abdomen and pelvis (without IV contrast) on 7/16/2019 with mesenteric thickening, small volume of complex fluid in the mesentery which was suspicious and possibly representative of carcinomatosis, small amount of perihepatic fluid, small bowel loops tethered to omentum without obstruction, omental thickening, shotty retroperitoneal and pelvic lymph nodes (non-pathologically enlarged).  · PET scan 7/26/2019 with hypermetabolic omental activity, hypermetabolic small retroperitoneal lymph nodes, hypermetabolic activity throughout uterus with increase in size.  · CT-guided omental biopsy 7/30/2019 with metastatic lobular carcinoma of breast primary, ER positive (greater than 95%), AK positive (90%), HER-2/tj negative (1+ IHC)  · Baseline CA 15-3 on 7/22/19 was 32.6  · Initiation of Aromasin 25 mg daily 8/12/2019.  Initiated Ibrance at 100 mg 21/28 days on 8/26/2019.  · Improvement in CA 15-3 on 9/17/19-26.3  · Following 2 cycles of Aromasin and Ibrance, CA 15-3 declined to 25.9 on 10/15/2019.  CT scan chest abdomen pelvis 10/16/2019 showed improvement in the mesenteric and omental thickening and near resolution of complex ascites.  Treatment continued.  · Stable findings on subsequent CT chest abdomen pelvis  12/11/2019.  Further improvement in CA 15-3-23.9 on 12/18/2019.  Ibrance/Aromasin continued.   · Foundation medicine liquid biopsy 2/5/2020: MSI undetermined, mutations in BETH, NF1, CHEK2.  No evidence of PIK3CA mutation.  · Slight increase in CA 15-3 to 27.1 on 2/19/2020 and 29.1 on 3/18/2020.  CT however on 3/13/2020 with no new findings.  · Subsequent improvement in CA 15-3-26.5 on 5/15/2020  · CT 7/31/2020 showed evidence of stable disease.  CA 15-3 declined further to 23.1 on 7/24/2020.  · CT 10/19/2020 with stable disease.  Fluctuations in CA 15-3 of unclear significance.  · CA 15-3 on 1/6/2021 decreased to 23.  CT chest abdomen pelvis 1/29/2021 with stable findings.  · CA 15-3 on 4/1/2021 was 28.4  · CT 4/22/2021 with no evidence of progressive malignancy, notation of new focal linear subpleural opacification right upper lobe felt to be infectious and/or inflammatory.  Enlargement of 2 staghorn calculi left kidney.  Persistent left perinephric stranding.    · Hospitalization 4/29-5/4/2021 with RYAN/CKD, UTI, anemia.  Required 2 unit PRBC transfusion and initiated hemodialysis Tuesday Thursday Saturday.  Brief interruption in Ibrance during hospitalization.  · CA 15-3 on 5/27/2021 was 27.8.  · Patient returns today in follow-up continuing on Aromasin 25 mg daily, Ibrance 100 mg daily for 21/28 days (started current cycle Ibrance on 5/24/2021).    The patient is continuing currently on hemodialysis Tuesday Thursday Saturday at this point.  She clinically has improved on dialysis and reports that she is tolerating Ibrance better with reduction in nausea and improvement in appetite and oral intake.  She will continue her current course of Ibrance.  Patient notes that she may require procedure for removal of left staghorn renal calculus in the interval with urology.  She will return in 2 weeks for nurse practitioner visit with CBC, CMP.  She will be due to start the next cycle of Ibrance shortly thereafter on  6/28/2021.  In 4 weeks she will return for labs and RN review with CBC, CMP, CA 15-3.  In 5 weeks she will undergo repeat CT chest abdomen pelvis and I will see her in 6 weeks to review scan results with CBC, CMP and she will be due to begin next cycle of Ibrance shortly thereafter on 7/26/2021.  2. Anemia secondary to CKD3/4:  · The patient appears to have a chronic anemia with hemoglobin in the 10-11 range with normal MCV.  · Patient has underlying CKD3 with baseline creatinine recently in the 1.5-1.8 range.  · Anemia evaluation 7/22/2019 with iron 30, ferritin 85.2, iron saturation 10%, TIBC 311, B12 370, erythropoietin level 20.4  · Anemia felt in large part to be related to CKD3 as well as to underlying malignancy  · Evidence of folate deficiency 8/12/2019 with level 3.84, initiated folic acid 1 mg daily  · Additional labs on 12/18/2019 with iron 73, ferritin 82.2, iron saturation 25%, TIBC 290.  We did discuss the potential use of Procrit if her hemoglobin declines into the low 9/upper 8 range.  · Decline in hemoglobin into the 8 range, iron studies 7/20/2020 with iron 54, ferritin 66.7, iron saturation 16%, TIBC 328.  On 8/7/2020 proceeded with Injectafer 750 mg IV x1 dose.  Second dose not administered due to onset of fever, subsequent iron studies precluded further administration of IV iron.  · Initiated Procrit 20,000 units every 4 weeks on 9/4/2020.  · On 1/6/2021, hemoglobin declined significantly to 7.1.  This was repeated and verified at 7.2.  No evidence of GI blood loss, stool cards negative for occult blood x3 on 1/12/2021.  Additional labs on 1/6/2021 with iron 47, ferritin 430, iron saturation 20%, TIBC 235, folate greater than 20, B12 324, haptoglobin 525, .  · Transfused 2 unit PRBC on 1/7/2021  · Change in Procrit dosing to weekly  · Due to low normal B12 level initiated oral B12 1000 mcg daily.  · Patient with symptomatic decline in hemoglobin to 7.7 on 4/1/2021, received 1 unit PRBC  transfusion  · Hospitalization 4/29-5/4/2021 with RYAN/CKD, UTI, anemia.  Required 2 unit PRBC transfusion and initiated hemodialysis Tuesday Thursday Saturday.  · Transfused 2 units PRBC for hemoglobin 7.2 on 5/27/2021  · Patient returns today in follow-up with hemoglobin 7.9.  At this point she is continuing on hemodialysis Tuesday Thursday Saturday and her anemia is being managed by nephrology, receiving Epogen with each dialysis treatment.  Patient is asymptomatic, will not pursue transfusion for current hemoglobin 7.9.  3. RYAN/CKD3/4:  · Baseline creatinine recently in 1.6-2.0 range  · On 9/10/2019, RYAN/CKD3 with creatinine up to 2.44, BUN of 40.  Received 1 L normal saline.  Patient with increase in oral fluid intake.  · Renal ultrasound 9/20/2019 with no evidence of obstructive uropathy.  · Diminished oral intake due to nausea from Ibrance, creatinine 2.79 with BUN 43 on 10/15/2019.  Received 1 L normal saline.  Repeat labs on 10/18/2019 with BUN 31, creatinine 2.0.  · During cycle 3 Ibrance, patient developed increase in creatinine on 11/6/2019 to 2.35 and received 1 L normal saline.  · Patient is trying to maintain adequate oral hydration.    · Gradual escalation of creatinine into the 2-2.4 range and subsequently into the 2.5-2.8 range  · Creatinine increased into the low 3 range in early January 2021.  Patient increased oral hydration with improvement back into the mid 2 range.  · Hospitalization 4/29-5/4/2021 with RYAN/CKD, UTI, anemia.  Required 2 unit PRBC transfusion and initiated hemodialysis Tuesday Thursday Saturday.   · Patient is currently continuing on hemodialysis Tuesday Thursday Saturday.  4. CHF with mild to moderate aortic stenosis:  · Recent cardiology evaluation with echocardiogram 7/12/2019 showing ejection fraction 48% with moderate dilation of the LV cavity, global hypokinesis, grade 2 diastolic dysfunction, mild to moderate aortic stenosis, trivial pericardial effusion.  · CT chest  7/12/2019 with no change in the aorta compared to prior study 12/13/2017.  5. Left upper and bilateral lower extremity peripheral neuropathy:  · Patient reports that left upper extremity neuropathy was not present from previous chemotherapy but occurred after hospitalization that required intubation and critical care stay.  Apparently felt to represent critical care neuropathy.  Improved over time.  · Bilateral lower extremity neuropathy felt to be related to diabetes  · May have future implications regarding treatment for breast cancer.  6. Uterine/endometrial activity on PET scan:  · Patient experienced postmenopausal vaginal bleeding in 2013, negative endometrial biopsy and gynecologic evaluation.  · With findings on PET scan with enlarging uterus and some hypermetabolic activity, referred back to Dr. Oliveros and gynecology.    · 9/19/2019 D&C with hysteroscopy by Dr. Oliveros, no significant intraoperative findings, pathologic results with benign findings, no evidence of malignancy.  7. Nausea:  · Mild, relieved with Zofran.   · Patient reports improvement in nausea since initiating hemodialysis  8. Myelosuppression secondary to Ibrance:  · With cycle 1, jessica WBC 2.49 with ANC 1.25 and platelet count 140,000.  · With cycle 2, jessica WBC 2.33 with ANC 1.06, maintained normal platelet count  · Today, WBC 3.3 with ANC 2.25  9. Depression  · When seen on 3/4/2021, patient indicated increasing symptoms of depression.  She did not wish to discuss this further at the time.  She indicated that she was coping adequately.  She was offered referral to the supportive oncology clinic however declined this.  10. Recurrent UTI with enlarging staghorn calculi in the left kidney  · CT 4/22/2021 showed interval enlargement of 2 staghorn calculi in the left kidney with persistent left perinephric stranding  · Hospitalization 4/29-5/4/2021 with RYAN/CKD, UTI, anemia.  Required 2 unit PRBC transfusion and initiated hemodialysis Tuesday  Thursday Saturday.    · Patient was seen by urology during hospitalization and is felt to require procedure to remove staghorn calculus which will need to be performed percutaneously in the near future.    Plan:  1. Continue Ibrance 100 mg daily for 21/28 days (started current cycle Ibrance on 5/24/2021).  2. Continue Aromasin 25 mg daily   3. The patient continues on hemodialysis Tuesday Thursday Saturday  4. Nephrology is currently managing the patient's anemia, patient receiving Epogen with dialysis  5. The patient will also need to continue oral folic acid 1 mg daily, B12 1000 mcg daily.  6. The patient will likely require upcoming procedure with urology for percutaneous retrieval of left renal staghorn calculi  7. In 2 weeks nurse practitioner visit with CBC, CMP.  Patient will be due to begin next cycle of Ibrance on 6/28/2021.  8. In 4 weeks CBC, CMP, CA 15-3 and RN review  9. In 5 weeks CT chest abdomen pelvis  10. In 6 weeks MD visit with CBC CMP.  Patient will be due to begin next cycle of Ibrance on 7/26/2021.     Patient continues on high risk medication requiring intensive monitoring.

## 2021-06-10 ENCOUNTER — TRANSCRIBE ORDERS (OUTPATIENT)
Dept: ADMINISTRATIVE | Facility: HOSPITAL | Age: 61
End: 2021-06-10

## 2021-06-10 DIAGNOSIS — N20.0 RENAL STONES: Primary | ICD-10-CM

## 2021-06-23 ENCOUNTER — OFFICE VISIT (OUTPATIENT)
Dept: ONCOLOGY | Facility: CLINIC | Age: 61
End: 2021-06-23

## 2021-06-23 ENCOUNTER — DOCUMENTATION (OUTPATIENT)
Dept: PHARMACY | Facility: HOSPITAL | Age: 61
End: 2021-06-23

## 2021-06-23 ENCOUNTER — LAB (OUTPATIENT)
Dept: LAB | Facility: HOSPITAL | Age: 61
End: 2021-06-23

## 2021-06-23 VITALS
WEIGHT: 293 LBS | HEART RATE: 78 BPM | BODY MASS INDEX: 45.99 KG/M2 | SYSTOLIC BLOOD PRESSURE: 163 MMHG | HEIGHT: 67 IN | TEMPERATURE: 98.6 F | DIASTOLIC BLOOD PRESSURE: 74 MMHG | OXYGEN SATURATION: 94 % | RESPIRATION RATE: 17 BRPM

## 2021-06-23 DIAGNOSIS — D63.1 ANEMIA IN STAGE 3 CHRONIC KIDNEY DISEASE, UNSPECIFIED WHETHER STAGE 3A OR 3B CKD (HCC): ICD-10-CM

## 2021-06-23 DIAGNOSIS — Z79.899 HIGH RISK MEDICATION USE: ICD-10-CM

## 2021-06-23 DIAGNOSIS — C50.919 METASTATIC BREAST CANCER: Primary | ICD-10-CM

## 2021-06-23 DIAGNOSIS — E87.5 HYPERKALEMIA: ICD-10-CM

## 2021-06-23 DIAGNOSIS — N18.32 STAGE 3B CHRONIC KIDNEY DISEASE (HCC): ICD-10-CM

## 2021-06-23 DIAGNOSIS — N18.30 ANEMIA IN STAGE 3 CHRONIC KIDNEY DISEASE, UNSPECIFIED WHETHER STAGE 3A OR 3B CKD (HCC): ICD-10-CM

## 2021-06-23 DIAGNOSIS — C50.919 METASTATIC BREAST CANCER: ICD-10-CM

## 2021-06-23 LAB
ALBUMIN SERPL-MCNC: 3.4 G/DL (ref 3.5–5.2)
ALBUMIN/GLOB SERPL: 0.8 G/DL (ref 1.1–2.4)
ALP SERPL-CCNC: 74 U/L (ref 38–116)
ALT SERPL W P-5'-P-CCNC: 7 U/L (ref 0–33)
ANION GAP SERPL CALCULATED.3IONS-SCNC: 8.5 MMOL/L (ref 5–15)
AST SERPL-CCNC: 12 U/L (ref 0–32)
BASOPHILS # BLD AUTO: 0.06 10*3/MM3 (ref 0–0.2)
BASOPHILS NFR BLD AUTO: 3 % (ref 0–1.5)
BILIRUB SERPL-MCNC: 0.7 MG/DL (ref 0.2–1.2)
BUN SERPL-MCNC: 17 MG/DL (ref 6–20)
BUN/CREAT SERPL: 5.5 (ref 7.3–30)
CALCIUM SPEC-SCNC: 9.1 MG/DL (ref 8.5–10.2)
CHLORIDE SERPL-SCNC: 104 MMOL/L (ref 98–107)
CO2 SERPL-SCNC: 25.5 MMOL/L (ref 22–29)
CREAT SERPL-MCNC: 3.11 MG/DL (ref 0.6–1.1)
DEPRECATED RDW RBC AUTO: 90 FL (ref 37–54)
EOSINOPHIL # BLD AUTO: 0.04 10*3/MM3 (ref 0–0.4)
EOSINOPHIL NFR BLD AUTO: 2 % (ref 0.3–6.2)
ERYTHROCYTE [DISTWIDTH] IN BLOOD BY AUTOMATED COUNT: 22.3 % (ref 12.3–15.4)
GFR SERPL CREATININE-BSD FRML MDRD: 15 ML/MIN/1.73
GLOBULIN UR ELPH-MCNC: 4.2 GM/DL (ref 1.8–3.5)
GLUCOSE SERPL-MCNC: 138 MG/DL (ref 74–124)
HCT VFR BLD AUTO: 25.8 % (ref 34–46.6)
HGB BLD-MCNC: 7.9 G/DL (ref 12–15.9)
IMM GRANULOCYTES # BLD AUTO: 0.02 10*3/MM3 (ref 0–0.05)
IMM GRANULOCYTES NFR BLD AUTO: 1 % (ref 0–0.5)
LYMPHOCYTES # BLD AUTO: 0.77 10*3/MM3 (ref 0.7–3.1)
LYMPHOCYTES NFR BLD AUTO: 39.1 % (ref 19.6–45.3)
MCH RBC QN AUTO: 33.9 PG (ref 26.6–33)
MCHC RBC AUTO-ENTMCNC: 30.6 G/DL (ref 31.5–35.7)
MCV RBC AUTO: 110.7 FL (ref 79–97)
MONOCYTES # BLD AUTO: 0.28 10*3/MM3 (ref 0.1–0.9)
MONOCYTES NFR BLD AUTO: 14.2 % (ref 5–12)
NEUTROPHILS NFR BLD AUTO: 0.8 10*3/MM3 (ref 1.7–7)
NEUTROPHILS NFR BLD AUTO: 40.7 % (ref 42.7–76)
NRBC BLD AUTO-RTO: 1 /100 WBC (ref 0–0.2)
PLATELET # BLD AUTO: 164 10*3/MM3 (ref 140–450)
PMV BLD AUTO: 8.9 FL (ref 6–12)
POTASSIUM SERPL-SCNC: 5.1 MMOL/L (ref 3.5–4.7)
PROT SERPL-MCNC: 7.6 G/DL (ref 6.3–8)
RBC # BLD AUTO: 2.33 10*6/MM3 (ref 3.77–5.28)
SODIUM SERPL-SCNC: 138 MMOL/L (ref 134–145)
WBC # BLD AUTO: 1.97 10*3/MM3 (ref 3.4–10.8)

## 2021-06-23 PROCEDURE — 80053 COMPREHEN METABOLIC PANEL: CPT

## 2021-06-23 PROCEDURE — 36415 COLL VENOUS BLD VENIPUNCTURE: CPT

## 2021-06-23 PROCEDURE — 85025 COMPLETE CBC W/AUTO DIFF WBC: CPT

## 2021-06-23 PROCEDURE — 99214 OFFICE O/P EST MOD 30 MIN: CPT | Performed by: NURSE PRACTITIONER

## 2021-06-23 RX ORDER — LEVOFLOXACIN 500 MG/1
TABLET, FILM COATED ORAL
COMMUNITY
Start: 2021-06-08 | End: 2021-07-07

## 2021-06-23 RX ORDER — NITROFURANTOIN 25; 75 MG/1; MG/1
CAPSULE ORAL
COMMUNITY
Start: 2021-06-08 | End: 2021-07-07

## 2021-06-23 NOTE — PROGRESS NOTES
Ibrance PA approved through [facility] from 6/22/2021 to 6/22/2022.

## 2021-06-23 NOTE — PROGRESS NOTES
"Chief Complaint  Metastatic lobular breast cancer (ER/W[place] positive, HER-2/tj negative), anemia secondary to CKD3, CHF, peripheral neuropathy, chemotherapy related nausea chemotherapy-induced myelosuppression    Subjective        History of Present Illness  The patient is a 60 y.o. female with the above mentioned history here today for follow up.  She continues on Aromasin 25 mg daily and Ibrance 100 mg daily 21/28 days.  She is currently off Ibrance, and due to start her next Monday 6/28/2021.  She is continuing to tolerate treatment well.  She is eating and drinking adequately.  Her bowels are moving regularly.  She denies nausea or vomiting.  She continues on hemodialysis 3 days/week on Tuesday, Thursday, and Saturday.  Nephrology continues to manage her anemia with Epogen with each dialysis.  She denies dizziness, lightheadedness, or increased fatigue.    The patient does report that over the last 2 weeks she developed a UTI and was treated with Keflex, for which she finished antibiotic therapy yesterday 6/22/2021.  She currently denies any urinary signs or symptoms.    Objective   Vital Signs:   /74   Pulse 78   Temp 98.6 °F (37 °C) (Temporal)   Resp 17   Ht 170.2 cm (67.01\")   Wt (!) 169 kg (371 lb 12.8 oz)   SpO2 94%   BMI 58.22 kg/m²     Physical Exam  Constitutional:       Appearance: She is well-developed. She is obese.      Comments: Patient is ambulating with the use of a walker today   Eyes:      Conjunctiva/sclera: Conjunctivae normal.   Neck:      Thyroid: No thyromegaly.   Cardiovascular:      Rate and Rhythm: Normal rate and regular rhythm.      Heart sounds: Murmur heard.   No friction rub. No gallop.       Comments: 2/6 murmur  Pulmonary:      Effort: No respiratory distress.      Breath sounds: Normal breath sounds.   Abdominal:      General: Bowel sounds are normal. There is no distension.      Palpations: Abdomen is soft.      Tenderness: There is no abdominal tenderness. "   Musculoskeletal:         General: Swelling present.      Comments: Trace to 1+ bilateral lower extremity edema, improved from prior exam   Lymphadenopathy:      Head:      Right side of head: No submandibular adenopathy.      Cervical: No cervical adenopathy.      Upper Body:      Right upper body: No supraclavicular adenopathy.      Left upper body: No supraclavicular adenopathy.   Skin:     General: Skin is warm and dry.      Findings: No bruising or rash.   Neurological:      Mental Status: She is alert and oriented to person, place, and time.      Cranial Nerves: No cranial nerve deficit.      Motor: No abnormal muscle tone.      Deep Tendon Reflexes: Reflexes normal.   Psychiatric:         Behavior: Behavior normal.        Result Review : Reviewed CBC, CMP from today.       Assessment and Plan    1. Metastatic lobular breast cancer (ER/[place] positive, HER-2/tj negative):  · History of stage IIIC right breast cancer ([id number])  · Patient treated previously in the [facility]  · Diagnosis via excisional biopsy 8/23/2003 with lobular carcinoma, 4.5 cm, positive margins.  ER/NV positive, HER-2/tj negative.  · Staging evaluation in September 2003 with negative bone scan and negative CT scans.  Ejection fraction 65%.  · Received neoadjuvant chemotherapy (? GET regimen) which apparently included Taxotere and possibly epirubicin.  Received 6 cycles.  · 1/22/2004 bilateral mastectomy with right axillary dissection.  Per available records, 10-12 cm residual invasive lobular carcinoma in the breast with 14/16 lymph nodes positive with extranodal extension.  Immediate reconstruction.  · Received adjuvant chemotherapy with carboplatin and navelbine x4 cycles  · Initiated adjuvant tamoxifen 6/22/2004  · Adjuvant radiation therapy initiated but interrupted due to chest wall cellulitis requiring removal of implants in August 2004  · Implant reconstruction again attempted with MRSA infection, implant removed 12/30/2005  with no further attempts made and reconstruction.  · Completed 5 years tamoxifen in June 2009 and subsequently transitioned to Femara.  Received Femara until June 2014.  · Patient experienced postmenopausal vaginal bleeding in 2013, negative endometrial biopsy and gynecologic evaluation.  · Patient last seen in [facility] 6/18/2014  · CT chest performed to evaluate bicuspid aortic valve and dilated asending aorta 7/12/2019.  CT showed no change in aorta however showed new free intraperitoneal fluid in the upper abdomen with mesenteric edema, concerning for carcinomatosis.  · CT abdomen and pelvis (without IV contrast) on 7/16/2019 with mesenteric thickening, small volume of complex fluid in the mesentery which was suspicious and possibly representative of carcinomatosis, small amount of perihepatic fluid, small bowel loops tethered to omentum without obstruction, omental thickening, shotty retroperitoneal and pelvic lymph nodes (non-pathologically enlarged).  · PET scan 7/26/2019 with hypermetabolic omental activity, hypermetabolic small retroperitoneal lymph nodes, hypermetabolic activity throughout uterus with increase in size.  · CT-guided omental biopsy 7/30/2019 with metastatic lobular carcinoma of breast primary, ER positive (greater than 95%), CA positive (90%), HER-2/tj negative (1+ IHC)  · Baseline CA 15-3 on 7/22/19 was 32.6  · Initiation of Aromasin 25 mg daily 8/12/2019.  Initiated Ibrance at 100 mg 21/28 days on 8/26/2019.  · Improvement in CA 15-3 on 9/17/19-26.3  · Following 2 cycles of Aromasin and Ibrance, CA 15-3 declined to 25.9 on 10/15/2019.  CT scan chest abdomen pelvis 10/16/2019 showed improvement in the mesenteric and omental thickening and near resolution of complex ascites.  Treatment continued.  · Stable findings on subsequent CT chest abdomen pelvis 12/11/2019.  Further improvement in CA 15-3-23.9 on 12/18/2019.  Ibrance/Aromasin continued.   · [facility] medicine liquid biopsy  2/5/2020: MSI undetermined, mutations in BETH, NF1, CHEK2.  No evidence of PIK3CA mutation.  · Slight increase in CA 15-3 to 27.1 on 2/19/2020 and 29.1 on 3/18/2020.  CT however on 3/13/2020 with no new findings.  · Subsequent improvement in CA 15-3-26.5 on 5/15/2020  · CT 7/31/2020 showed evidence of stable disease.  CA 15-3 declined further to 23.1 on 7/24/2020.  · CT 10/19/2020 with stable disease.  Fluctuations in CA 15-3 of unclear significance.  · CA 15-3 on 1/6/2021 decreased to 23.  CT chest abdomen pelvis 1/29/2021 with stable findings.  · CA 15-3 on 4/1/2021 was 28.4  · CT 4/22/2021 with no evidence of progressive malignancy, notation of new focal linear subpleural opacification right upper lobe felt to be infectious and/or inflammatory.  Enlargement of 2 staghorn calculi left kidney.  Persistent left perinephric stranding.    · Hospitalization 4/29-5/4/2021 with RYAN/CKD, UTI, anemia.  Required 2 unit PRBC transfusion and initiated hemodialysis Tuesday Thursday Saturday.  Brief interruption in Ibrance during hospitalization.  · CA 15-3 on 5/27/2021 was 27.8.  · Patient returns today in follow-up continuing on Aromasin 25 mg daily, Ibrance 100 mg daily for 21/28 days (started current cycle Ibrance on 5/24/2021).    The patient is continuing currently on hemodialysis Tuesday Thursday Saturday at this point.  She clinically has improved on dialysis and reports that she is tolerating Ibrance better with reduction in nausea and improvement in appetite and oral intake.  She will continue her current course of Ibrance.  Patient notes that she may require procedure for removal of left staghorn renal calculus in the interval with urology.   · 6/23/2021, the patient returns today for follow-up continuing on Aromasin 25 mg daily along with Ibrance 100 mg daily for 21 out of 28 days.  She is currently off Ibrance and is due to start her next cycle on Monday, 6/28/2021.  She reports tolerating her treatment very well.   She is no longer experiencing nausea and continues to notice improvement to her appetite and oral intake.  Her ANC today is 0.8, she does report recent infection with UTI for which she was treated with Keflex and finished antibiotic therapy yesterday.  She will return on Monday for CBC with RN review prior to initiating her next cycle of Ibrance.  If ANC is greater than 1.0 patient can proceed with next cycle of Ibrance.  2. Anemia secondary to CKD3/4:  · The patient appears to have a chronic anemia with hemoglobin in the 10-11 range with normal MCV.  · Patient has underlying CKD3 with baseline creatinine recently in the 1.5-1.8 range.  · Anemia evaluation 7/22/2019 with iron 30, ferritin 85.2, iron saturation 10%, TIBC 311, B12 370, erythropoietin level 20.4  · Anemia felt in large part to be related to CKD3 as well as to underlying malignancy  · Evidence of folate deficiency 8/12/2019 with level 3.84, initiated folic acid 1 mg daily  · Additional labs on 12/18/2019 with iron 73, ferritin 82.2, iron saturation 25%, TIBC 290.  We did discuss the potential use of Procrit if her hemoglobin declines into the low 9/upper 8 range.  · Decline in hemoglobin into the 8 range, iron studies 7/20/2020 with iron 54, ferritin 66.7, iron saturation 16%, TIBC 328.  On 8/7/2020 proceeded with Injectafer 750 mg IV x1 dose.  Second dose not administered due to onset of fever, subsequent iron studies precluded further administration of IV iron.  · Initiated Procrit 20,000 units every 4 weeks on 9/4/2020.  · On 1/6/2021, hemoglobin declined significantly to 7.1.  This was repeated and verified at 7.2.  No evidence of GI blood loss, stool cards negative for occult blood x3 on 1/12/2021.  Additional labs on 1/6/2021 with iron 47, ferritin 430, iron saturation 20%, TIBC 235, folate greater than 20, B12 324, haptoglobin 525, .  · Transfused 2 unit PRBC on 1/7/2021  · Change in Procrit dosing to weekly  · Due to low normal B12 level  initiated oral B12 1000 mcg daily.  · Patient with symptomatic decline in hemoglobin to 7.7 on 4/1/2021, received 1 unit PRBC transfusion  · Hospitalization 4/29-5/4/2021 with RYAN/CKD, UTI, anemia.  Required 2 unit PRBC transfusion and initiated hemodialysis Tuesday Thursday Saturday.  · Transfused 2 units PRBC for hemoglobin 7.2 on 5/27/2021  · 6/23/2021, hemoglobin today 7.9.  Patient continues on hemodialysis Tuesday, Thursday, and Saturday.  Her anemia is being managed by nephrology, receiving Epogen with each dialysis treatment.  Today she is asymptomatic so we will not transfuse for hemoglobin 7.9 today.  3. RYAN/CKD3/4:  · Baseline creatinine recently in 1.6-2.0 range  · On 9/10/2019, RYAN/CKD3 with creatinine up to 2.44, BUN of 40.  Received 1 L normal saline.  Patient with increase in oral fluid intake.  · Renal ultrasound 9/20/2019 with no evidence of obstructive uropathy.  · Diminished oral intake due to nausea from Ibrance, creatinine 2.79 with BUN 43 on 10/15/2019.  Received 1 L normal saline.  Repeat labs on 10/18/2019 with BUN 31, creatinine 2.0.  · During cycle 3 Ibrance, patient developed increase in creatinine on 11/6/2019 to 2.35 and received 1 L normal saline.  · Patient is trying to maintain adequate oral hydration.    · Gradual escalation of creatinine into the 2-2.4 range and subsequently into the 2.5-2.8 range  · Creatinine increased into the low 3 range in early January 2021.  Patient increased oral hydration with improvement back into the mid 2 range.  · Hospitalization 4/29-5/4/2021 with RYAN/CKD, UTI, anemia.  Required 2 unit PRBC transfusion and initiated hemodialysis Tuesday Thursday Saturday.   · Creatinine today 3.11.  The patient continues on hemodialysis Tuesday, Thursday, and Saturday.  4. CHF with mild to moderate aortic stenosis:  · Recent cardiology evaluation with echocardiogram 7/12/2019 showing ejection fraction 48% with moderate dilation of the LV cavity, global hypokinesis,  grade 2 diastolic dysfunction, mild to moderate aortic stenosis, trivial pericardial effusion.  · CT chest 7/12/2019 with no change in the aorta compared to prior study 12/13/2017.  5. Left upper and bilateral lower extremity peripheral neuropathy:  · Patient reports that left upper extremity neuropathy was not present from previous chemotherapy but occurred after hospitalization that required intubation and critical care stay.  Apparently felt to represent critical care neuropathy.  Improved over time.  · Bilateral lower extremity neuropathy felt to be related to diabetes  · May have future implications regarding treatment for breast cancer.  6. Uterine/endometrial activity on PET scan:  · Patient experienced postmenopausal vaginal bleeding in 2013, negative endometrial biopsy and gynecologic evaluation.  · With findings on PET scan with enlarging uterus and some hypermetabolic activity, referred back to Dr. Oliveros and gynecology.    · 9/19/2019 D&C with hysteroscopy by Dr. Oliveros, no significant intraoperative findings, pathologic results with benign findings, no evidence of malignancy.  7. Nausea:  · Mild, relieved with Zofran.   · Patient reports improvement in nausea since initiating hemodialysis  8. Myelosuppression secondary to Ibrance:  · With cycle 1, jessica WBC 2.49 with ANC 1.25 and platelet count 140,000.  · With cycle 2, jessica WBC 2.33 with ANC 1.06, maintained normal platelet count  · Today, WBC 1.97 with ANC 0.8.  Patient will return Monday for CBC prior to initiating next cycle of Ibrance.  If ANC is greater than 1.0 the patient may initiate her next cycle of Ibrance.  9. Depression  · When seen on 3/4/2021, patient indicated increasing symptoms of depression.  She did not wish to discuss this further at the time.  She indicated that she was coping adequately.  She was offered referral to the supportive oncology clinic however declined this.  10. Recurrent UTI with enlarging staghorn calculi in the  left kidney  · CT 4/22/2021 showed interval enlargement of 2 staghorn calculi in the left kidney with persistent left perinephric stranding  · Hospitalization 4/29-5/4/2021 with RYAN/CKD, UTI, anemia.  Required 2 unit PRBC transfusion and initiated hemodialysis Tuesday Thursday Saturday.    · Patient was seen by urology during hospitalization and is felt to require procedure to remove staghorn calculus which will need to be performed percutaneously in the near future.  · 6/23/2021, patient reports she recently received antibiotic treatment for recent UTI, having her third UTI in a couple of weeks.  She was treated with Keflex and finished treatment yesterday.  Her urinary symptoms have resolved for now.  She will have further follow up with urology.    Plan:  1. Return Monday for CBC with RN review.  If ANC greater than 1.0 may start next cycle of Ibrance.  If ANC less than 1.0 RN will discuss with APRN and we will consider delaying start of next cycle of Ibrance by 1 week.  2. Continue Ibrance 100 mg daily for 21/28 days (currently on her off week, due to start next cycle 6/28/2021).  3. Continue Aromasin 25 mg daily   4. The patient continues on hemodialysis Tuesday Thursday Saturday  5. Nephrology is currently managing the patient's anemia, patient receiving Epogen with dialysis  6. Continue oral folic acid 1 mg daily, B12 1000 mcg daily.  7. The patient will likely require upcoming procedure with urology for percutaneous retrieval of left renal staghorn calculi.  She is scheduled for CT scan 7/2/2021, this will help determine intervention by urology.  8. In 1 week CT chest abdomen pelvis  9. In 2 weeks CBC, CMP, CA 15-3 and RN review  10. In 4 weeks MD visit with CBC, CMP.  Patient will be due to begin next cycle of Ibrance on 7/26/2021.     Patient continues on high risk medication requiring intensive monitoring.    BEYT Young  06/23/2021

## 2021-06-28 ENCOUNTER — CLINICAL SUPPORT (OUTPATIENT)
Dept: ONCOLOGY | Facility: HOSPITAL | Age: 61
End: 2021-06-28

## 2021-06-28 ENCOUNTER — LAB (OUTPATIENT)
Dept: LAB | Facility: HOSPITAL | Age: 61
End: 2021-06-28

## 2021-06-28 DIAGNOSIS — N18.30 ANEMIA IN STAGE 3 CHRONIC KIDNEY DISEASE, UNSPECIFIED WHETHER STAGE 3A OR 3B CKD (HCC): Primary | ICD-10-CM

## 2021-06-28 DIAGNOSIS — D50.8 OTHER IRON DEFICIENCY ANEMIA: Primary | ICD-10-CM

## 2021-06-28 DIAGNOSIS — D63.1 ANEMIA IN STAGE 3 CHRONIC KIDNEY DISEASE, UNSPECIFIED WHETHER STAGE 3A OR 3B CKD (HCC): Primary | ICD-10-CM

## 2021-06-28 LAB
ABO GROUP BLD: NORMAL
BASOPHILS # BLD AUTO: 0.11 10*3/MM3 (ref 0–0.2)
BASOPHILS NFR BLD AUTO: 2.6 % (ref 0–1.5)
BLD GP AB SCN SERPL QL: NEGATIVE
DEPRECATED RDW RBC AUTO: 80.8 FL (ref 37–54)
EOSINOPHIL # BLD AUTO: 0.04 10*3/MM3 (ref 0–0.4)
EOSINOPHIL NFR BLD AUTO: 0.9 % (ref 0.3–6.2)
ERYTHROCYTE [DISTWIDTH] IN BLOOD BY AUTOMATED COUNT: 20.5 % (ref 12.3–15.4)
HCT VFR BLD AUTO: 22.9 % (ref 34–46.6)
HGB BLD-MCNC: 7.3 G/DL (ref 12–15.9)
IMM GRANULOCYTES # BLD AUTO: 0.04 10*3/MM3 (ref 0–0.05)
IMM GRANULOCYTES NFR BLD AUTO: 0.9 % (ref 0–0.5)
LYMPHOCYTES # BLD AUTO: 0.77 10*3/MM3 (ref 0.7–3.1)
LYMPHOCYTES NFR BLD AUTO: 17.9 % (ref 19.6–45.3)
MCH RBC QN AUTO: 34.1 PG (ref 26.6–33)
MCHC RBC AUTO-ENTMCNC: 31.9 G/DL (ref 31.5–35.7)
MCV RBC AUTO: 107 FL (ref 79–97)
MONOCYTES # BLD AUTO: 0.75 10*3/MM3 (ref 0.1–0.9)
MONOCYTES NFR BLD AUTO: 17.5 % (ref 5–12)
NEUTROPHILS NFR BLD AUTO: 2.58 10*3/MM3 (ref 1.7–7)
NEUTROPHILS NFR BLD AUTO: 60.2 % (ref 42.7–76)
NRBC BLD AUTO-RTO: 0.5 /100 WBC (ref 0–0.2)
PLATELET # BLD AUTO: 215 10*3/MM3 (ref 140–450)
PMV BLD AUTO: 10.2 FL (ref 6–12)
RBC # BLD AUTO: 2.14 10*6/MM3 (ref 3.77–5.28)
RH BLD: POSITIVE
T&S EXPIRATION DATE: NORMAL
WBC # BLD AUTO: 4.29 10*3/MM3 (ref 3.4–10.8)

## 2021-06-28 PROCEDURE — 85025 COMPLETE CBC W/AUTO DIFF WBC: CPT

## 2021-06-28 PROCEDURE — 86923 COMPATIBILITY TEST ELECTRIC: CPT

## 2021-06-28 PROCEDURE — 86850 RBC ANTIBODY SCREEN: CPT | Performed by: INTERNAL MEDICINE

## 2021-06-28 PROCEDURE — G0463 HOSPITAL OUTPT CLINIC VISIT: HCPCS

## 2021-06-28 PROCEDURE — 86901 BLOOD TYPING SEROLOGIC RH(D): CPT | Performed by: INTERNAL MEDICINE

## 2021-06-28 PROCEDURE — 86900 BLOOD TYPING SEROLOGIC ABO: CPT | Performed by: INTERNAL MEDICINE

## 2021-06-28 PROCEDURE — 36415 COLL VENOUS BLD VENIPUNCTURE: CPT

## 2021-06-28 RX ORDER — SODIUM CHLORIDE 9 MG/ML
250 INJECTION, SOLUTION INTRAVENOUS AS NEEDED
Status: CANCELLED | OUTPATIENT
Start: 2021-06-30

## 2021-06-28 RX ORDER — DIPHENHYDRAMINE HCL 25 MG
25 CAPSULE ORAL ONCE
Status: CANCELLED | OUTPATIENT
Start: 2021-06-30 | End: 2021-06-28

## 2021-06-28 RX ORDER — ACETAMINOPHEN 325 MG/1
650 TABLET ORAL ONCE
Status: CANCELLED | OUTPATIENT
Start: 2021-06-30 | End: 2021-06-28

## 2021-06-28 NOTE — NURSING NOTE
Lab Results   Component Value Date    WBC 4.29 06/28/2021    HGB 7.3 (C) 06/28/2021    HCT 22.9 (L) 06/28/2021    .0 (H) 06/28/2021     06/28/2021     ANC 2.58    Pt is here for lab with RN review.  CBC reviewed with pt.She reports having no energy and feeling extremely tired. Reviewed with BETY Young. Per Maritza, she is to resume Ibrance as scheduled. She will also need RBC transfusion. Patient escorted to lab for T&C to be drawn. Instructed to leave fenwall bracelet on. Patient V/U. Message sent to scheduling. Copy of labs given to pt and f/u appt reviewed. Pt is instructed to call the office with any concerns or new symptoms prior to next visit. Pete horner

## 2021-06-30 ENCOUNTER — HOSPITAL ENCOUNTER (OUTPATIENT)
Dept: INFUSION THERAPY | Facility: HOSPITAL | Age: 61
Discharge: HOME | End: 2021-06-30
Admitting: INTERNAL MEDICINE

## 2021-06-30 VITALS
SYSTOLIC BLOOD PRESSURE: 146 MMHG | DIASTOLIC BLOOD PRESSURE: 74 MMHG | RESPIRATION RATE: 20 BRPM | HEART RATE: 66 BPM | OXYGEN SATURATION: 100 % | TEMPERATURE: 96.9 F

## 2021-06-30 DIAGNOSIS — D50.8 OTHER IRON DEFICIENCY ANEMIA: ICD-10-CM

## 2021-06-30 PROCEDURE — 36430 TRANSFUSION BLD/BLD COMPNT: CPT

## 2021-06-30 PROCEDURE — 86900 BLOOD TYPING SEROLOGIC ABO: CPT

## 2021-06-30 PROCEDURE — P9016 RBC LEUKOCYTES REDUCED: HCPCS

## 2021-06-30 PROCEDURE — 63710000001 DIPHENHYDRAMINE PER 50 MG: Performed by: INTERNAL MEDICINE

## 2021-06-30 RX ORDER — DIPHENHYDRAMINE HCL 25 MG
25 CAPSULE ORAL ONCE
Status: COMPLETED | OUTPATIENT
Start: 2021-06-30 | End: 2021-06-30

## 2021-06-30 RX ORDER — SODIUM CHLORIDE 9 MG/ML
250 INJECTION, SOLUTION INTRAVENOUS AS NEEDED
Status: DISCONTINUED | OUTPATIENT
Start: 2021-06-30 | End: 2021-07-02 | Stop reason: HOSPADM

## 2021-06-30 RX ORDER — ACETAMINOPHEN 325 MG/1
650 TABLET ORAL ONCE
Status: COMPLETED | OUTPATIENT
Start: 2021-06-30 | End: 2021-06-30

## 2021-06-30 RX ADMIN — DIPHENHYDRAMINE HYDROCHLORIDE 25 MG: 25 CAPSULE ORAL at 07:34

## 2021-06-30 RX ADMIN — ACETAMINOPHEN 650 MG: 325 TABLET, FILM COATED ORAL at 07:34

## 2021-07-01 ENCOUNTER — MEDICATION THERAPY MANAGEMENT (OUTPATIENT)
Dept: PHARMACY | Facility: HOSPITAL | Age: 61
End: 2021-07-01

## 2021-07-01 NOTE — PROGRESS NOTES
MTM telephone encounter re:adherence and side effects (Ibrance)     Called patient, as she was supposed to restart Ibrance after last visit. Patient did not answer, left message.     Rosanna Serna, ReinaD  7/1/2021  13:42 EDT

## 2021-07-02 ENCOUNTER — HOSPITAL ENCOUNTER (OUTPATIENT)
Dept: CT IMAGING | Facility: HOSPITAL | Age: 61
Discharge: HOME | End: 2021-07-02
Admitting: INTERNAL MEDICINE

## 2021-07-02 DIAGNOSIS — C50.919 METASTATIC BREAST CANCER: ICD-10-CM

## 2021-07-02 DIAGNOSIS — N20.0 RENAL STONES: ICD-10-CM

## 2021-07-02 PROCEDURE — 74176 CT ABD & PELVIS W/O CONTRAST: CPT

## 2021-07-02 PROCEDURE — 71250 CT THORAX DX C-: CPT

## 2021-07-06 ENCOUNTER — TRANSCRIBE ORDERS (OUTPATIENT)
Dept: ADMINISTRATIVE | Facility: HOSPITAL | Age: 61
End: 2021-07-06

## 2021-07-06 DIAGNOSIS — N18.30 ANEMIA DUE TO STAGE 3 CHRONIC KIDNEY DISEASE TREATED WITH ERYTHROPOIETIN (HCC): ICD-10-CM

## 2021-07-06 DIAGNOSIS — N18.30 STAGE 3 CHRONIC RENAL IMPAIRMENT ASSOCIATED WITH TYPE 2 DIABETES MELLITUS (HCC): ICD-10-CM

## 2021-07-06 DIAGNOSIS — D63.1 ANEMIA DUE TO STAGE 3 CHRONIC KIDNEY DISEASE TREATED WITH ERYTHROPOIETIN (HCC): ICD-10-CM

## 2021-07-06 DIAGNOSIS — D50.9 IRON DEFICIENCY ANEMIA, UNSPECIFIED IRON DEFICIENCY ANEMIA TYPE: Primary | ICD-10-CM

## 2021-07-06 DIAGNOSIS — E11.22 STAGE 3 CHRONIC RENAL IMPAIRMENT ASSOCIATED WITH TYPE 2 DIABETES MELLITUS (HCC): ICD-10-CM

## 2021-07-07 ENCOUNTER — CLINICAL SUPPORT (OUTPATIENT)
Dept: ONCOLOGY | Facility: HOSPITAL | Age: 61
End: 2021-07-07

## 2021-07-07 ENCOUNTER — SPECIALTY PHARMACY (OUTPATIENT)
Dept: ONCOLOGY | Facility: HOSPITAL | Age: 61
End: 2021-07-07

## 2021-07-07 ENCOUNTER — LAB (OUTPATIENT)
Dept: LAB | Facility: HOSPITAL | Age: 61
End: 2021-07-07

## 2021-07-07 DIAGNOSIS — Z79.4 CONTROLLED TYPE 2 DIABETES MELLITUS WITHOUT COMPLICATION, WITH LONG-TERM CURRENT USE OF INSULIN (HCC): ICD-10-CM

## 2021-07-07 DIAGNOSIS — C50.919 METASTATIC BREAST CANCER: ICD-10-CM

## 2021-07-07 DIAGNOSIS — E11.9 CONTROLLED TYPE 2 DIABETES MELLITUS WITHOUT COMPLICATION, WITH LONG-TERM CURRENT USE OF INSULIN (HCC): ICD-10-CM

## 2021-07-07 LAB
ALBUMIN SERPL-MCNC: 3 G/DL (ref 3.5–5.2)
ALBUMIN/GLOB SERPL: 0.7 G/DL (ref 1.1–2.4)
ALP SERPL-CCNC: 86 U/L (ref 38–116)
ALT SERPL W P-5'-P-CCNC: 13 U/L (ref 0–33)
ANION GAP SERPL CALCULATED.3IONS-SCNC: 11.1 MMOL/L (ref 5–15)
AST SERPL-CCNC: 15 U/L (ref 0–32)
BASOPHILS # BLD AUTO: 0.07 10*3/MM3 (ref 0–0.2)
BASOPHILS NFR BLD AUTO: 1.3 % (ref 0–1.5)
BILIRUB SERPL-MCNC: 0.5 MG/DL (ref 0.2–1.2)
BUN SERPL-MCNC: 21 MG/DL (ref 6–20)
BUN/CREAT SERPL: 6.1 (ref 7.3–30)
CALCIUM SPEC-SCNC: 8.6 MG/DL (ref 8.5–10.2)
CANCER AG15-3 SERPL-ACNC: 25.3 U/ML
CHLORIDE SERPL-SCNC: 103 MMOL/L (ref 98–107)
CO2 SERPL-SCNC: 23.9 MMOL/L (ref 22–29)
CREAT SERPL-MCNC: 3.42 MG/DL (ref 0.6–1.1)
DEPRECATED RDW RBC AUTO: 72.7 FL (ref 37–54)
EOSINOPHIL # BLD AUTO: 0.1 10*3/MM3 (ref 0–0.4)
EOSINOPHIL NFR BLD AUTO: 1.9 % (ref 0.3–6.2)
ERYTHROCYTE [DISTWIDTH] IN BLOOD BY AUTOMATED COUNT: 19.2 % (ref 12.3–15.4)
GFR SERPL CREATININE-BSD FRML MDRD: 14 ML/MIN/1.73
GFR SERPL CREATININE-BSD FRML MDRD: ABNORMAL ML/MIN/{1.73_M2}
GLOBULIN UR ELPH-MCNC: 4.3 GM/DL (ref 1.8–3.5)
GLUCOSE SERPL-MCNC: 155 MG/DL (ref 74–124)
HCT VFR BLD AUTO: 26 % (ref 34–46.6)
HGB BLD-MCNC: 8.1 G/DL (ref 12–15.9)
IMM GRANULOCYTES # BLD AUTO: 0.06 10*3/MM3 (ref 0–0.05)
IMM GRANULOCYTES NFR BLD AUTO: 1.1 % (ref 0–0.5)
LYMPHOCYTES # BLD AUTO: 0.91 10*3/MM3 (ref 0.7–3.1)
LYMPHOCYTES NFR BLD AUTO: 16.9 % (ref 19.6–45.3)
MCH RBC QN AUTO: 32.7 PG (ref 26.6–33)
MCHC RBC AUTO-ENTMCNC: 31.2 G/DL (ref 31.5–35.7)
MCV RBC AUTO: 104.8 FL (ref 79–97)
MONOCYTES # BLD AUTO: 0.22 10*3/MM3 (ref 0.1–0.9)
MONOCYTES NFR BLD AUTO: 4.1 % (ref 5–12)
NEUTROPHILS NFR BLD AUTO: 4.03 10*3/MM3 (ref 1.7–7)
NEUTROPHILS NFR BLD AUTO: 74.7 % (ref 42.7–76)
NRBC BLD AUTO-RTO: 0 /100 WBC (ref 0–0.2)
PLATELET # BLD AUTO: 338 10*3/MM3 (ref 140–450)
PMV BLD AUTO: 8.9 FL (ref 6–12)
POTASSIUM SERPL-SCNC: 4.4 MMOL/L (ref 3.5–4.7)
PROT SERPL-MCNC: 7.3 G/DL (ref 6.3–8)
RBC # BLD AUTO: 2.48 10*6/MM3 (ref 3.77–5.28)
SODIUM SERPL-SCNC: 138 MMOL/L (ref 134–145)
WBC # BLD AUTO: 5.39 10*3/MM3 (ref 3.4–10.8)

## 2021-07-07 PROCEDURE — 85025 COMPLETE CBC W/AUTO DIFF WBC: CPT

## 2021-07-07 PROCEDURE — 80053 COMPREHEN METABOLIC PANEL: CPT

## 2021-07-07 PROCEDURE — 36415 COLL VENOUS BLD VENIPUNCTURE: CPT

## 2021-07-07 PROCEDURE — G0463 HOSPITAL OUTPT CLINIC VISIT: HCPCS

## 2021-07-07 PROCEDURE — 86300 IMMUNOASSAY TUMOR CA 15-3: CPT | Performed by: INTERNAL MEDICINE

## 2021-07-07 RX ORDER — ACETAMINOPHEN 500 MG
500 TABLET ORAL AS NEEDED
COMMUNITY

## 2021-07-07 RX ORDER — LORATADINE 10 MG/1
10 TABLET ORAL AS NEEDED
COMMUNITY
End: 2021-11-26

## 2021-07-07 NOTE — NURSING NOTE
Lab Results   Component Value Date    WBC 5.39 07/07/2021    HGB 8.1 (L) 07/07/2021    HCT 26.0 (L) 07/07/2021    .8 (H) 07/07/2021     07/07/2021     Pt is here for lab with RN review.  CBC reviewed with pt, counts are stable for this pt at this time. Pt has no complaints.  Copy of labs given to pt and f/u appt reviewed. Pt is instructed to call the office with any concerns or new symptoms prior to next visit. Pt vu

## 2021-07-07 NOTE — PROGRESS NOTES
MTM Encounter-Re: Adherence and side effects (Ibrance)    Today's encounter was conducted in person, face-to-face.     Specialty Pharmacy Assessment    Palbociclib (Ibrance)  Dose: 100 mg  Frequency: Once a day for 21 days, followed by 7 days off  Indication: Breast cancer  Follow-up: Yes  Side effect assessment: No/Minimal effects  Compliance: Patient reports taking capsules whole once daily for 21 days followed by 7 days off, Patient reports taking around the same time every day  Additional notes: Juana reports 100% adherence to Ibrance 100 mg po daily, on for 21 days, then off x 7 days. She is currently in her 3rd week on. Patient denies side effects.  Patient denies starting any new medications. Assessed medication list for interactions, no significant drug interactions noted. Patient has had no issues obtaining medication from pharmacy.          Juana had no questions or concerns for the MTM office.     Rosanna Serna, PharmD  7/7/2021  14:20 EDT

## 2021-07-08 RX ORDER — ESCITALOPRAM OXALATE 10 MG/1
TABLET ORAL
Qty: 30 TABLET | Refills: 0 | Status: SHIPPED | OUTPATIENT
Start: 2021-07-08 | End: 2021-08-03

## 2021-07-19 ENCOUNTER — OFFICE VISIT (OUTPATIENT)
Dept: CARDIOLOGY | Facility: CLINIC | Age: 61
End: 2021-07-19

## 2021-07-19 ENCOUNTER — HOSPITAL ENCOUNTER (OUTPATIENT)
Dept: CARDIOLOGY | Facility: HOSPITAL | Age: 61
Discharge: HOME | End: 2021-07-19
Admitting: NURSE PRACTITIONER

## 2021-07-19 VITALS
HEIGHT: 67 IN | WEIGHT: 293 LBS | BODY MASS INDEX: 45.99 KG/M2 | HEART RATE: 81 BPM | DIASTOLIC BLOOD PRESSURE: 80 MMHG | SYSTOLIC BLOOD PRESSURE: 132 MMHG | OXYGEN SATURATION: 93 %

## 2021-07-19 VITALS
BODY MASS INDEX: 45.99 KG/M2 | DIASTOLIC BLOOD PRESSURE: 76 MMHG | OXYGEN SATURATION: 100 % | HEART RATE: 66 BPM | SYSTOLIC BLOOD PRESSURE: 140 MMHG | WEIGHT: 293 LBS | HEIGHT: 67 IN

## 2021-07-19 DIAGNOSIS — I77.810 ASCENDING AORTA DILATATION (HCC): ICD-10-CM

## 2021-07-19 DIAGNOSIS — C80.0 CARCINOMATOSIS (HCC): ICD-10-CM

## 2021-07-19 DIAGNOSIS — I35.0 NONRHEUMATIC AORTIC VALVE STENOSIS: ICD-10-CM

## 2021-07-19 DIAGNOSIS — Q23.1 CONGENITAL BICUSPID AORTIC VALVE: ICD-10-CM

## 2021-07-19 DIAGNOSIS — Q23.1 CONGENITAL BICUSPID AORTIC VALVE: Primary | ICD-10-CM

## 2021-07-19 PROCEDURE — 93306 TTE W/DOPPLER COMPLETE: CPT | Performed by: INTERNAL MEDICINE

## 2021-07-19 PROCEDURE — 99214 OFFICE O/P EST MOD 30 MIN: CPT | Performed by: NURSE PRACTITIONER

## 2021-07-19 PROCEDURE — 93306 TTE W/DOPPLER COMPLETE: CPT

## 2021-07-19 PROCEDURE — 25010000002 PERFLUTREN (DEFINITY) 8.476 MG IN SODIUM CHLORIDE (PF) 0.9 % 10 ML INJECTION: Performed by: NURSE PRACTITIONER

## 2021-07-19 PROCEDURE — 93356 MYOCRD STRAIN IMG SPCKL TRCK: CPT | Performed by: INTERNAL MEDICINE

## 2021-07-19 PROCEDURE — 93356 MYOCRD STRAIN IMG SPCKL TRCK: CPT

## 2021-07-19 RX ORDER — CARVEDILOL 3.12 MG/1
3.12 TABLET ORAL 2 TIMES DAILY
Qty: 180 TABLET | Refills: 3 | Status: SHIPPED | OUTPATIENT
Start: 2021-07-19 | End: 2022-09-22

## 2021-07-19 RX ORDER — AMLODIPINE BESYLATE 5 MG/1
5 TABLET ORAL DAILY
Qty: 90 TABLET | Refills: 3 | Status: SHIPPED | OUTPATIENT
Start: 2021-07-19 | End: 2022-08-29 | Stop reason: SDUPTHER

## 2021-07-19 RX ORDER — FOLIC ACID 1 MG/1
TABLET ORAL
Qty: 90 TABLET | Refills: 1 | Status: SHIPPED | OUTPATIENT
Start: 2021-07-19 | End: 2022-01-25

## 2021-07-19 RX ADMIN — PERFLUTREN 1.5 ML: 6.52 INJECTION, SUSPENSION INTRAVENOUS at 09:15

## 2021-07-19 NOTE — PROGRESS NOTES
"    CARDIOLOGY        Patient Name: Juana Hall  :1960  Age: 60 y.o.  Primary Cardiologist: Krish Harkins MD  Encounter Provider:  BETY Jones    Date of Service: 21          CHIEF COMPLAINT / REASON FOR OFFICE VISIT     Follow-up (6 month f/u / echo also)      HISTORY OF PRESENT ILLNESS       HPI  Juana Hall is a 60 y.o. female who presents today for semiannual evaluation.     Pt has a  history significant for bicuspid aortic valve, mild aortic valve stenosis, mildly dilated ascending aorta, metastatic breast cancer.    Patient reports that she is done very well over the past 6 months.  She is still on oral chemotherapy in the form of Ibrance and exemestance.  She states that she is asymptomatic from a cardiovascular standpoint.  She does have baseline shortness of breath and dyspnea with exertion secondary to her obesity but states that the symptoms are not any worse than they normally are for her.  She denies any episodes of chest discomfort, heart palpitations, lightheadedness, lower extremity edema, worsening fatigue.    The following portions of the patient's history were reviewed and updated as appropriate: allergies, current medications, past family history, past medical history, past social history, past surgical history and problem list.      VITAL SIGNS     Visit Vitals  /76 (BP Location: Left arm, Patient Position: Sitting, Cuff Size: Adult) Comment (BP Location): forearm   Pulse 66   Ht 170.2 cm (67\")   Wt (!) 174 kg (384 lb)   LMP  (LMP Unknown)   SpO2 100%   BMI 60.14 kg/m²         Wt Readings from Last 3 Encounters:   21 (!) 174 kg (384 lb)   21 (!) 168 kg (371 lb)   21 (!) 169 kg (371 lb 12.8 oz)     Body mass index is 60.14 kg/m².      REVIEW OF SYSTEMS   Review of Systems   Constitutional: Negative for chills, fever, weight gain and weight loss.   Cardiovascular: Positive for dyspnea on exertion. Negative for leg swelling.   Respiratory: " Positive for shortness of breath. Negative for cough, snoring and wheezing.    Hematologic/Lymphatic: Negative for bleeding problem. Does not bruise/bleed easily.   Skin: Negative for color change.   Musculoskeletal: Negative for falls, joint pain and myalgias.   Gastrointestinal: Negative for melena.   Genitourinary: Negative for hematuria.   Neurological: Negative for excessive daytime sleepiness.   Psychiatric/Behavioral: Negative for depression. The patient is not nervous/anxious.            PHYSICAL EXAMINATION     Vitals and nursing note reviewed.   Constitutional:       Appearance: Normal appearance. Well-developed.   Eyes:      Conjunctiva/sclera: Conjunctivae normal.   Neck:      Comments: Unable to assess for carotid bruit secondary to harsh radiating murmur.  Pulmonary:      Breath sounds: Normal breath sounds.   Cardiovascular:      Normal rate. Regular rhythm. Normal S1 with normal intensity. Normal S2 with normal intensity.      Murmurs: There is a grade 4/6 harsh midsystolic murmur at the URSB and ULSB, radiating to the neck.      No gallop. No click. No rub.   Musculoskeletal: Normal range of motion. Skin:     General: Skin is warm and dry.   Neurological:      Mental Status: Alert and oriented to person, place, and time.      GCS: GCS eye subscore is 4. GCS verbal subscore is 5. GCS motor subscore is 6.   Psychiatric:         Speech: Speech normal.         Behavior: Behavior normal.         Thought Content: Thought content normal.         Judgment: Judgment normal.           REVIEWED DATA     Procedures    Cardiac Procedures:  1. Echocardiogram on 10/23/2017: The ejection fraction was low normal at 50-55%. There was mild LVH.  There was grade 1 diastolic dysfunction.  The left atrium was mildly dilated.  A bicuspid aortic valve could not be excluded.  The aortic valve leaflets were heavily calcified.  There was mild aortic stenosis with a peak gradient of 19 mmHg, and a mean gradient of 11  mmHg.  2. CT scan of the chest without contrast on 12/13/2017: The ascending aorta was mildly dilated at 3.8 cm.  There was no aortic aneurysm.  3. Echocardiogram on 7/12/2019: The left ventricle was mildly to moderately enlarged.  There was borderline global hypokinesis.  The calculated ejection fraction was 48%.  There was grade 2 diastolic dysfunction.  The right ventricle was normal.  The aortic valve was bicuspid and heavily calcified.  There was mild to moderate aortic stenosis with a peak pressure of 28 mmHg and a mean pressure of 17 mmHg.  There were several calcified chordae.  There was mild mitral regurgitation.    Lipid Panel    Lipid Panel 9/24/20 3/3/21   Total Cholesterol 119 91 (A)   Triglycerides 127 175 (A)   HDL Cholesterol 34 (A) 25 (A)   VLDL Cholesterol 23 29   LDL Cholesterol  62 37   (A) Abnormal value                ASSESSMENT & PLAN      Diagnosis Plan   1. Congenital bicuspid aortic valve     2. Nonrheumatic aortic valve stenosis     3. Ascending aorta dilatation (CMS/HCC)     4. Carcinomatosis (CMS/HCC)           SUMMARY/DISCUSSION  1. Congenital bicuspid aortic valve with aortic valve stenosis.  Patient had echocardiogram prior to appointment today.  Full interpretation not yet available however both peak and mean pressure gradients have elevated since last assessment in 2019.  Patient states that she is asymptomatic and denies any chest discomfort, lightheadedness.  She does have baseline shortness of breath as well as dyspnea with exertion that has not changed for her.  We will call patient when full echocardiogram results are available.  For now she will require yearly surveillance echocardiograms.  2. Ascending aorta dilation.  Echocardiogram pending.  3. Carcinomatosis.  Currently on oral chemotherapy with Ibrance and exemesatance.  4. Follow-up with Dr. Harkins in 6 months.  Sooner for any problems or complications.        MEDICATIONS         Discharge Medications           Accurate as of July 19, 2021  9:48 AM. If you have any questions, ask your nurse or doctor.            Continue These Medications      Instructions Start Date   acetaminophen 500 MG tablet  Commonly known as: TYLENOL   500 mg, Oral, As Needed      amLODIPine 5 MG tablet  Commonly known as: NORVASC   1 tablet, Oral, Daily      aspirin 81 MG EC tablet   81 mg, Oral, Daily, HELD FOR SURGERY      atorvastatin 40 MG tablet  Commonly known as: LIPITOR   40 mg, Oral, Daily      BD Pen Needle Gini U/F 32G X 4 MM misc  Generic drug: Insulin Pen Needle   1 each, Does not apply, Daily      carvedilol 3.125 MG tablet  Commonly known as: COREG   3.125 mg, Oral, 2 Times Daily      Dulaglutide 1.5 MG/0.5ML solution pen-injector   1.5 mg, Subcutaneous, Weekly, MONDAYS      escitalopram 10 MG tablet  Commonly known as: LEXAPRO   TAKE ONE TABLET BY MOUTH DAILY      exemestane 25 MG chemo tablet  Commonly known as: AROMASIN   TAKE ONE TABLET BY MOUTH DAILY      folic acid 1 MG tablet  Commonly known as: FOLVITE   TAKE ONE TABLET BY MOUTH DAILY      glucose blood test strip   1 each, Other, 2 times daily, One touch ultra 2 test strips Check blood sugar 2 times daily       Levemir FlexTouch 100 UNIT/ML injection  Generic drug: insulin detemir   40 Units, Subcutaneous, 2 Times Daily      levothyroxine 50 MCG tablet  Commonly known as: SYNTHROID, LEVOTHROID   50 mcg, Oral, Daily      loratadine 10 MG tablet  Commonly known as: CLARITIN   10 mg, Oral, As Needed      ondansetron 8 MG tablet  Commonly known as: Zofran   8 mg, Oral, Every 8 Hours PRN      Palbociclib 100 MG tablet tablet   TAKE 1 TABLET BY MOUTH DAILY FOR 21 DAYS ON THEN 7 DAYS OFF      vitamin B-12 1000 MCG tablet  Commonly known as: CYANOCOBALAMIN   1,000 mcg, Oral, Daily      vitamin D 1.25 MG (70183 UT) capsule capsule  Commonly known as: ERGOCALCIFEROL   50,000 Units, Every 7 Days                 **Dragon Disclaimer:   Much of this encounter note is an electronic  transcription/translation of spoken language to printed text. The electronic translation of spoken language may permit erroneous, or at times, nonsensical words or phrases to be inadvertently transcribed. Although I have reviewed the note for such errors, some may still exist.

## 2021-07-20 LAB
AORTIC ARCH: 2.4 CM
AORTIC DIMENSIONLESS INDEX: 0.4 (DI)
ASCENDING AORTA: 3 CM
BH CV ECHO MEAS - ACS: 1 CM
BH CV ECHO MEAS - AO MAX PG (FULL): 44.2 MMHG
BH CV ECHO MEAS - AO MAX PG: 50.1 MMHG
BH CV ECHO MEAS - AO MEAN PG (FULL): 29 MMHG
BH CV ECHO MEAS - AO MEAN PG: 32 MMHG
BH CV ECHO MEAS - AO ROOT AREA (BSA CORRECTED): 1.2
BH CV ECHO MEAS - AO ROOT AREA: 8 CM^2
BH CV ECHO MEAS - AO ROOT DIAM: 3.2 CM
BH CV ECHO MEAS - AO V2 MAX: 354 CM/SEC
BH CV ECHO MEAS - AO V2 MEAN: 273 CM/SEC
BH CV ECHO MEAS - AO V2 VTI: 84.8 CM
BH CV ECHO MEAS - ASC AORTA: 3 CM
BH CV ECHO MEAS - AVA(I,A): 1.1 CM^2
BH CV ECHO MEAS - AVA(I,D): 1.1 CM^2
BH CV ECHO MEAS - AVA(V,A): 0.98 CM^2
BH CV ECHO MEAS - AVA(V,D): 0.98 CM^2
BH CV ECHO MEAS - BSA(HAYCOCK): 2.9 M^2
BH CV ECHO MEAS - BSA: 2.6 M^2
BH CV ECHO MEAS - BZI_BMI: 58.1 KILOGRAMS/M^2
BH CV ECHO MEAS - BZI_METRIC_HEIGHT: 170.2 CM
BH CV ECHO MEAS - BZI_METRIC_WEIGHT: 168.3 KG
BH CV ECHO MEAS - EDV(MOD-SP2): 243 ML
BH CV ECHO MEAS - EDV(MOD-SP4): 195 ML
BH CV ECHO MEAS - EDV(TEICH): 141.3 ML
BH CV ECHO MEAS - EF(CUBED): 70.4 %
BH CV ECHO MEAS - EF(MOD-BP): 55.8 %
BH CV ECHO MEAS - EF(MOD-SP2): 56.4 %
BH CV ECHO MEAS - EF(MOD-SP4): 53.8 %
BH CV ECHO MEAS - EF(TEICH): 61.5 %
BH CV ECHO MEAS - ESV(MOD-SP2): 106 ML
BH CV ECHO MEAS - ESV(MOD-SP4): 90 ML
BH CV ECHO MEAS - ESV(TEICH): 54.4 ML
BH CV ECHO MEAS - FS: 33.3 %
BH CV ECHO MEAS - IVS/LVPW: 1
BH CV ECHO MEAS - IVSD: 1 CM
BH CV ECHO MEAS - LAT PEAK E' VEL: 9.6 CM/SEC
BH CV ECHO MEAS - LV DIASTOLIC VOL/BSA (35-75): 74.2 ML/M^2
BH CV ECHO MEAS - LV MASS(C)D: 206.7 GRAMS
BH CV ECHO MEAS - LV MASS(C)DI: 78.6 GRAMS/M^2
BH CV ECHO MEAS - LV MAX PG: 6 MMHG
BH CV ECHO MEAS - LV MEAN PG: 3 MMHG
BH CV ECHO MEAS - LV SYSTOLIC VOL/BSA (12-30): 34.2 ML/M^2
BH CV ECHO MEAS - LV V1 MAX: 122 CM/SEC
BH CV ECHO MEAS - LV V1 MEAN: 88.3 CM/SEC
BH CV ECHO MEAS - LV V1 VTI: 32.7 CM
BH CV ECHO MEAS - LVIDD: 5.4 CM
BH CV ECHO MEAS - LVIDS: 3.6 CM
BH CV ECHO MEAS - LVLD AP2: 9.2 CM
BH CV ECHO MEAS - LVLD AP4: 9.5 CM
BH CV ECHO MEAS - LVLS AP2: 8.2 CM
BH CV ECHO MEAS - LVLS AP4: 8.3 CM
BH CV ECHO MEAS - LVOT AREA (M): 2.8 CM^2
BH CV ECHO MEAS - LVOT AREA: 2.8 CM^2
BH CV ECHO MEAS - LVOT DIAM: 1.9 CM
BH CV ECHO MEAS - LVPWD: 1 CM
BH CV ECHO MEAS - MED PEAK E' VEL: 6.7 CM/SEC
BH CV ECHO MEAS - MR MAX PG: 71.6 MMHG
BH CV ECHO MEAS - MR MAX VEL: 423 CM/SEC
BH CV ECHO MEAS - MV A DUR: 0.16 SEC
BH CV ECHO MEAS - MV A MAX VEL: 119 CM/SEC
BH CV ECHO MEAS - MV DEC SLOPE: 528 CM/SEC^2
BH CV ECHO MEAS - MV DEC TIME: 0.28 SEC
BH CV ECHO MEAS - MV E MAX VEL: 157 CM/SEC
BH CV ECHO MEAS - MV E/A: 1.3
BH CV ECHO MEAS - MV MAX PG: 8.1 MMHG
BH CV ECHO MEAS - MV MEAN PG: 4 MMHG
BH CV ECHO MEAS - MV P1/2T MAX VEL: 156 CM/SEC
BH CV ECHO MEAS - MV P1/2T: 86.5 MSEC
BH CV ECHO MEAS - MV V2 MAX: 142 CM/SEC
BH CV ECHO MEAS - MV V2 MEAN: 91.3 CM/SEC
BH CV ECHO MEAS - MV V2 VTI: 51.1 CM
BH CV ECHO MEAS - MVA P1/2T LCG: 1.4 CM^2
BH CV ECHO MEAS - MVA(P1/2T): 2.5 CM^2
BH CV ECHO MEAS - MVA(VTI): 1.8 CM^2
BH CV ECHO MEAS - PA ACC TIME: 0.19 SEC
BH CV ECHO MEAS - PA MAX PG (FULL): 3 MMHG
BH CV ECHO MEAS - PA MAX PG: 4.5 MMHG
BH CV ECHO MEAS - PA PR(ACCEL): -5.2 MMHG
BH CV ECHO MEAS - PA V2 MAX: 106 CM/SEC
BH CV ECHO MEAS - PULM A REVS DUR: 0.11 SEC
BH CV ECHO MEAS - PULM A REVS VEL: 45 CM/SEC
BH CV ECHO MEAS - PULM DIAS VEL: 42.4 CM/SEC
BH CV ECHO MEAS - PULM S/D: 1.3
BH CV ECHO MEAS - PULM SYS VEL: 53.1 CM/SEC
BH CV ECHO MEAS - PVA(V,A): 2 CM^2
BH CV ECHO MEAS - PVA(V,D): 2 CM^2
BH CV ECHO MEAS - QP/QS: 0.55
BH CV ECHO MEAS - RAP SYSTOLE: 15 MMHG
BH CV ECHO MEAS - RV MAX PG: 1.5 MMHG
BH CV ECHO MEAS - RV MEAN PG: 1 MMHG
BH CV ECHO MEAS - RV V1 MAX: 61.4 CM/SEC
BH CV ECHO MEAS - RV V1 MEAN: 48.2 CM/SEC
BH CV ECHO MEAS - RV V1 VTI: 14.8 CM
BH CV ECHO MEAS - RVOT AREA: 3.5 CM^2
BH CV ECHO MEAS - RVOT DIAM: 2.1 CM
BH CV ECHO MEAS - RVSP: 58 MMHG
BH CV ECHO MEAS - SI(AO): 259.4 ML/M^2
BH CV ECHO MEAS - SI(CUBED): 42.1 ML/M^2
BH CV ECHO MEAS - SI(LVOT): 35.3 ML/M^2
BH CV ECHO MEAS - SI(MOD-SP2): 52.1 ML/M^2
BH CV ECHO MEAS - SI(MOD-SP4): 39.9 ML/M^2
BH CV ECHO MEAS - SI(TEICH): 33 ML/M^2
BH CV ECHO MEAS - SUP REN AO DIAM: 2.3 CM
BH CV ECHO MEAS - SV(AO): 682 ML
BH CV ECHO MEAS - SV(CUBED): 110.8 ML
BH CV ECHO MEAS - SV(LVOT): 92.7 ML
BH CV ECHO MEAS - SV(MOD-SP2): 137 ML
BH CV ECHO MEAS - SV(MOD-SP4): 105 ML
BH CV ECHO MEAS - SV(RVOT): 51.3 ML
BH CV ECHO MEAS - SV(TEICH): 86.9 ML
BH CV ECHO MEAS - TAPSE (>1.6): 3 CM
BH CV ECHO MEAS - TR MAX VEL: 327 CM/SEC
BH CV ECHO MEAS - TV MAX PG: 25.2 MMHG
BH CV ECHO MEAS - TV V2 MAX: 251 CM/SEC
BH CV ECHO MEASUREMENTS AVERAGE E/E' RATIO: 19.26
BH CV XLRA - RV BASE: 3.2 CM
BH CV XLRA - RV LENGTH: 9.4 CM
BH CV XLRA - RV MID: 2.9 CM
BH CV XLRA - TDI S': 15 CM/SEC
LEFT ATRIUM VOLUME INDEX: 35 ML/M2
LV EF 2D ECHO EST: 56 %
MAXIMAL PREDICTED HEART RATE: 158 BPM
SINUS: 2.7 CM
STJ: 2.7 CM
STRESS TARGET HR: 134 BPM

## 2021-07-20 NOTE — PROGRESS NOTES
"Chief Complaint  Metastatic lobular breast cancer (ER/TX positive, HER-2/tj negative), anemia secondary to CKD3, CHF, peripheral neuropathy, chemotherapy related nausea chemotherapy-induced myelosuppression    Subjective        History of Present Illness  The patient returns today in follow-up with laboratory studies to review, continuing on Aromasin 25 mg daily and Ibrance 100 mg daily for 21/28 days.  The patient is currently on her off week of Ibrance due to begin treatment again on 7/26/2021.  The patient is continuing as well on oral B12 1000 mcg daily and oral folic acid 1 mg daily.  She is continuing on hemodialysis under the direction of Dr. Tai Antonio on Tuesdays Thursdays and Saturdays.  She notes that she has ongoing fatigue particularly after dialysis.  Her volume status is being maintained in a acceptable range.  She ultimately did not require procedure in relation to her left-sided staghorn calculus as it appears that she has passed one of the stones.  Urology is going to follow her in terms of the need possibly for lithotripsy regarding the remaining stone.  She reports over the past few days developing more significant general fatigue and dyspnea on exertion similar to symptoms she had when she was more significantly anemic in the past.  She has been receiving Neupogen with each dialysis.  She has not noted any evidence of GI blood loss.  She has no other specific complaints today.         Objective   Vital Signs:   /58   Pulse 67   Temp 97.6 °F (36.4 °C) (Temporal)   Resp 17   Ht 170.2 cm (67.01\")   Wt (!) 174 kg (384 lb)   SpO2 92%   BMI 60.13 kg/m²     Physical Exam  Constitutional:       Appearance: She is well-developed. She is obese.      Comments: Patient is in a motorized scooter today   Eyes:      Conjunctiva/sclera: Conjunctivae normal.   Neck:      Thyroid: No thyromegaly.   Cardiovascular:      Rate and Rhythm: Normal rate and regular rhythm.      Heart sounds: Murmur " heard.   No friction rub. No gallop.       Comments: 2/6 murmur  Pulmonary:      Effort: No respiratory distress.      Breath sounds: Normal breath sounds.      Comments: Dialysis access in place in the right subclavian position  Abdominal:      General: Bowel sounds are normal. There is no distension.      Palpations: Abdomen is soft.      Tenderness: There is no abdominal tenderness.   Musculoskeletal:         General: Swelling present.      Comments: Trace bilateral lower extremity edema, improved from prior exam   Lymphadenopathy:      Head:      Right side of head: No submandibular adenopathy.      Cervical: No cervical adenopathy.      Upper Body:      Right upper body: No supraclavicular adenopathy.      Left upper body: No supraclavicular adenopathy.   Skin:     General: Skin is warm and dry.      Findings: No bruising or rash.   Neurological:      Mental Status: She is alert and oriented to person, place, and time.      Cranial Nerves: No cranial nerve deficit.      Motor: No abnormal muscle tone.      Deep Tendon Reflexes: Reflexes normal.   Psychiatric:         Behavior: Behavior normal.        Result Review : Reviewed CBC, CMP from today.  Reviewed CT chest abdomen pelvis 7/2/2021.  I did personally review CT images with interpretation as noted in the assessment below.  Reviewed echocardiogram result 7/19/2021.  Reviewed CA 15-3 from 7/7/2021.       Assessment and Plan    1. Metastatic lobular breast cancer (ER/IL positive, HER-2/tj negative):  · History of stage IIIC right breast cancer ([id number])  · Patient treated previously in the [facility] system  · Diagnosis via excisional biopsy 8/23/2003 with lobular carcinoma, 4.5 cm, positive margins.  ER/IL positive, HER-2/tj negative.  · Staging evaluation in September 2003 with negative bone scan and negative CT scans.  Ejection fraction 65%.  · Received neoadjuvant chemotherapy (? GET regimen) which apparently included Taxotere and possibly epirubicin.   Received 6 cycles.  · 1/22/2004 bilateral mastectomy with right axillary dissection.  Per available records, 10-12 cm residual invasive lobular carcinoma in the breast with 14/16 lymph nodes positive with extranodal extension.  Immediate reconstruction.  · Received adjuvant chemotherapy with carboplatin and navelbine x4 cycles  · Initiated adjuvant tamoxifen 6/22/2004  · Adjuvant radiation therapy initiated but interrupted due to chest wall cellulitis requiring removal of implants in August 2004  · Implant reconstruction again attempted with MRSA infection, implant removed 12/30/2005 with no further attempts made and reconstruction.  · Completed 5 years tamoxifen in June 2009 and subsequently transitioned to Femara.  Received Femara until June 2014.  · Patient experienced postmenopausal vaginal bleeding in 2013, negative endometrial biopsy and gynecologic evaluation.  · Patient last seen in [facility] 6/18/2014  · CT chest performed to evaluate bicuspid aortic valve and dilated asending aorta 7/12/2019.  CT showed no change in aorta however showed new free intraperitoneal fluid in the upper abdomen with mesenteric edema, concerning for carcinomatosis.  · CT abdomen and pelvis (without IV contrast) on 7/16/2019 with mesenteric thickening, small volume of complex fluid in the mesentery which was suspicious and possibly representative of carcinomatosis, small amount of perihepatic fluid, small bowel loops tethered to omentum without obstruction, omental thickening, shotty retroperitoneal and pelvic lymph nodes (non-pathologically enlarged).  · PET scan 7/26/2019 with hypermetabolic omental activity, hypermetabolic small retroperitoneal lymph nodes, hypermetabolic activity throughout uterus with increase in size.  · CT-guided omental biopsy 7/30/2019 with metastatic lobular carcinoma of breast primary, ER positive (greater than 95%), [place] positive (90%), HER-2/tj negative (1+ IHC)  · Baseline CA 15-3 on 7/22/19 was  32.6  · Initiation of Aromasin 25 mg daily 8/12/2019.  Initiated Ibrance at 100 mg 21/28 days on 8/26/2019.  · Improvement in CA 15-3 on 9/17/19-26.3  · Following 2 cycles of Aromasin and Ibrance, CA 15-3 declined to 25.9 on 10/15/2019.  CT scan chest abdomen pelvis 10/16/2019 showed improvement in the mesenteric and omental thickening and near resolution of complex ascites.  Treatment continued.  · Stable findings on subsequent CT chest abdomen pelvis 12/11/2019.  Further improvement in CA 15-3-23.9 on 12/18/2019.  Ibrance/Aromasin continued.   · [facility] medicine liquid biopsy 2/5/2020: MSI undetermined, mutations in BETH, NF1, CHEK2.  No evidence of PIK3CA mutation.  · Slight increase in CA 15-3 to 27.1 on 2/19/2020 and 29.1 on 3/18/2020.  CT however on 3/13/2020 with no new findings.  · Subsequent improvement in CA 15-3-26.5 on 5/15/2020  · CT 7/31/2020 showed evidence of stable disease.  CA 15-3 declined further to 23.1 on 7/24/2020.  · CT 10/19/2020 with stable disease.  Fluctuations in CA 15-3 of unclear significance.  · CA 15-3 on 1/6/2021 decreased to 23.  CT chest abdomen pelvis 1/29/2021 with stable findings.  · CA 15-3 on 4/1/2021 was 28.4  · CT 4/22/2021 with no evidence of progressive malignancy, notation of new focal linear subpleural opacification right upper lobe felt to be infectious and/or inflammatory.  Enlargement of 2 staghorn calculi left kidney.  Persistent left perinephric stranding.    · Hospitalization 4/29-5/4/2021 with RYAN/CKD, UTI, anemia.  Required 2 unit PRBC transfusion and initiated hemodialysis Tuesday Thursday Saturday.  Brief interruption in Ibrance during hospitalization.  · CA 15-3 on 5/27/2021 was 27.8.  · CT chest abdomen pelvis 7/2/2021 showed no evidence of progression or new metastatic disease.  There was only one remaining left renal stone which was smaller than previous exam.  Stranding surrounding both kidneys left greater than right with left hydronephrosis and  hydroureter.  · CA 15-3 7/7/2021 was 25.3  · Patient returns today in follow-up continuing on Aromasin 25 mg daily, Ibrance 100 mg daily for 21/28 days (due to begin next cycle Ibrance on 7/26/2021).  The patient is tolerating Ibrance and Aromasin without any significant side effects.  I do however feel that her significant anemia is exacerbated by Ibrance.  Anemia is primarily related to her underlying renal failure.  Her WBC and platelet count remain normal however.  We reviewed her CT scan from 7/2/2021 which shows stable findings in relation to her underlying malignancy.  I therefore recommended that we continue on with treatment as planned and consider a repeat CT at approximately a 3-month interval from the current study.  Patient reports that urology may require a follow-up CT in regards to her nephrolithiasis and we will try to coordinate this with them.  We will need to continue monitoring her counts with CBC/CMP in 2 weeks with RN review.  In 4 weeks he will have nurse practitioner visit when she is preparing to begin her next cycle of Ibrance.  In 6 weeks she will again have CBC/CMP and RN review and I will see her in 8 weeks when she is again preparing to begin the next cycle of Ibrance.  We will discuss timing of follow-up scans at that time.  We will recheck CA 15-3 in 6 weeks prior to her return visit in 8 weeks.  As will be discussed below, patient will require transfusion support tomorrow with hemoglobin down to 6.5 today.  2. Anemia secondary to ESRD and possibly exacerbated by Ibrance:  · The patient appears to have a chronic anemia with hemoglobin in the 10-11 range with normal MCV.  · Patient has underlying CKD3 with baseline creatinine recently in the 1.5-1.8 range.  · Anemia evaluation 7/22/2019 with iron 30, ferritin 85.2, iron saturation 10%, TIBC 311, B12 370, erythropoietin level 20.4  · Anemia felt in large part to be related to CKD3 as well as to underlying malignancy  · Evidence of  folate deficiency 8/12/2019 with level 3.84, initiated folic acid 1 mg daily  · Additional labs on 12/18/2019 with iron 73, ferritin 82.2, iron saturation 25%, TIBC 290.  We did discuss the potential use of Procrit if her hemoglobin declines into the low 9/upper 8 range.  · Decline in hemoglobin into the 8 range, iron studies 7/20/2020 with iron 54, ferritin 66.7, iron saturation 16%, TIBC 328.  On 8/7/2020 proceeded with Injectafer 750 mg IV x1 dose.  Second dose not administered due to onset of fever, subsequent iron studies precluded further administration of IV iron.  · Initiated Procrit 20,000 units every 4 weeks on 9/4/2020.  · On 1/6/2021, hemoglobin declined significantly to 7.1.  This was repeated and verified at 7.2.  No evidence of GI blood loss, stool cards negative for occult blood x3 on 1/12/2021.  Additional labs on 1/6/2021 with iron 47, ferritin 430, iron saturation 20%, TIBC 235, folate greater than 20, B12 324, haptoglobin 525, .  · Transfused 2 unit PRBC on 1/7/2021  · Change in Procrit dosing to weekly  · Due to low normal B12 level initiated oral B12 1000 mcg daily.  · Patient with symptomatic decline in hemoglobin to 7.7 on 4/1/2021, received 1 unit PRBC transfusion  · Hospitalization 4/29-5/4/2021 with RYAN/CKD, UTI, anemia.  Required 2 unit PRBC transfusion and initiated hemodialysis Tuesday Thursday Saturday.  · Transfused 2 units PRBC for hemoglobin 7.2 on 5/27/2021  · Following initiation of hemodialysis, management of BEAU transitioned to nephrology, receiving Epogen with dialysis.  · Today, hemoglobin is significantly lower at 6.5.  The patient is on her off week of Ibrance and I suspect that the low hemoglobin is related to cumulative myelosuppression from treatment from the cycle of Ibrance in addition to her underlying anemia secondary to ESRD.  There has been no clinical evidence of GI blood loss and previous stool for occult blood was negative x3 (1/12/2021).  We did perform  additional anemia evaluation today with iron 17, ferritin 646, iron saturation 9%, TIBC 193, folate greater than 20, B12 690.  Therefore there are no indications for deficiencies to explain her anemia.  I did contact Dr. Antonio in nephrology.  Patient is due for dialysis in the morning and afterwards she will undergo 2 unit PRBC transfusion at [facility].  He will verify that she is on maximum dose Epogen with dialysis.  Patient is symptomatic today from her anemia reporting an increase in fatigue and dyspnea on exertion over the past few days similar to symptoms she has experienced with severe anemia in the past.  3. ESRD on HD:  · Baseline creatinine recently in 1.6-2.0 range  · On 9/10/2019, RYAN/CKD3 with creatinine up to 2.44, BUN of 40.  Received 1 L normal saline.  Patient with increase in oral fluid intake.  · Renal ultrasound 9/20/2019 with no evidence of obstructive uropathy.  · Diminished oral intake due to nausea from Ibrance, creatinine 2.79 with BUN 43 on 10/15/2019.  Received 1 L normal saline.  Repeat labs on 10/18/2019 with BUN 31, creatinine 2.0.  · During cycle 3 Ibrance, patient developed increase in creatinine on 11/6/2019 to 2.35 and received 1 L normal saline.  · Patient is trying to maintain adequate oral hydration.    · Gradual escalation of creatinine into the 2-2.4 range and subsequently into the 2.5-2.8 range  · Creatinine increased into the low 3 range in early January 2021.  Patient increased oral hydration with improvement back into the mid 2 range.  · Hospitalization 4/29-5/4/2021 with RYAN/CKD, UTI, anemia.  Required 2 unit PRBC transfusion and initiated hemodialysis Tuesday Thursday Saturday.   · Patient is currently continuing on hemodialysis Tuesday Thursday Saturday.  4. CHF with mild to moderate aortic stenosis:  · Recent cardiology evaluation with echocardiogram 7/12/2019 showing ejection fraction 48% with moderate dilation of the LV cavity, global hypokinesis, grade 2 diastolic  dysfunction, mild to moderate aortic stenosis, trivial pericardial effusion.  · CT chest 7/12/2019 with no change in the aorta compared to prior study 12/13/2017.  · Echocardiogram 7/19/2021 with ejection fraction 56%, left atrial volume moderately increased, grade 2 diastolic dysfunction, severe calcification aortic valve, moderate aortic stenosis, severe mitral calcification, mild mitral stenosis, marked elevation in RVSP from tricuspid regurgitation.  · Patient notes that she discussed echo results with cardiology and valvular status was felt to be stable in regards to aortic stenosis.  5. Left upper and bilateral lower extremity peripheral neuropathy:  · Patient reports that left upper extremity neuropathy was not present from previous chemotherapy but occurred after hospitalization that required intubation and critical care stay.  Apparently felt to represent critical care neuropathy.  Improved over time.  · Bilateral lower extremity neuropathy felt to be related to diabetes  · May have future implications regarding treatment for breast cancer.  6. Uterine/endometrial activity on PET scan:  · Patient experienced postmenopausal vaginal bleeding in 2013, negative endometrial biopsy and gynecologic evaluation.  · With findings on PET scan with enlarging uterus and some hypermetabolic activity, referred back to Dr. Oliveros and gynecology.    · 9/19/2019 D&C with hysteroscopy by Dr. Oliveros, no significant intraoperative findings, pathologic results with benign findings, no evidence of malignancy.  7. Nausea:  · Mild, relieved with Zofran.   · Patient reports improvement in nausea since initiating hemodialysis  8. Myelosuppression secondary to Ibrance:  · With cycle 1, jessica WBC 2.49 with ANC 1.25 and platelet count 140,000.  · With cycle 2, jessica WBC 2.33 with ANC 1.06, maintained normal platelet count  · Today, WBC 4.4 with ANC 3.15  9. Depression  · When seen on 3/4/2021, patient indicated increasing symptoms of  depression.  She did not wish to discuss this further at the time.  She indicated that she was coping adequately.  She was offered referral to the supportive oncology clinic however declined this.  10. Recurrent UTI with enlarging staghorn calculi in the left kidney  · CT 4/22/2021 showed interval enlargement of 2 staghorn calculi in the left kidney with persistent left perinephric stranding  · Hospitalization 4/29-5/4/2021 with RYAN/CKD, UTI, anemia.  Required 2 unit PRBC transfusion and initiated hemodialysis Tuesday Thursday Saturday.    · Discussion from urology regarding potential need for surgical procedure to address staghorn calculus left kidney  · CT scan 7/2/2021 with only 1 remaining left renal stone identified which had decreased in size.  Patient appears to have passed the other stone.  · Patient reports that urology does not plan a procedure at this point however may wish to pursue follow-up CT in the future.  We will try to coordinate scans with CT needed for monitoring of her breast cancer status.    Plan:  1. Continue Ibrance 100 mg daily for 21/28 days (patient is due to start next cycle of Ibrance on 7/26/2021).  2. Continue Aromasin 25 mg daily   3. The patient continues on hemodialysis Tuesday Thursday Saturday  4. Nephrology is currently managing the patient's anemia, patient receiving Epogen with dialysis.  Discussed with Dr. Antonio today and he will ensure patient is receiving maximum dose Epogen  5. The patient will need transfusion to unit PRBC tomorrow and we will schedule this to follow her dialysis appointment in the morning.  Dr. Antonio is aware.  6. Additional labs pending today with iron panel, ferritin, B12, folate  7. Continue oral folic acid 1 mg daily, B12 1000 mcg daily.  8. In 2 weeks CBC, CMP and RN review  9. In 4 weeks nurse practitioner visit with CBC, CMP.  Patient will be due to begin next cycle of Ibrance on 8/23/2021.  10. In 6 weeks CBC, CMP, CA 15-3 and RN  review  11. In 8 weeks MD visit with CBC, CMP.  Patient will be due to begin next cycle Ibrance on 9/20/2021.     Patient continues on high risk medication requiring intensive monitoring.    I did spend 40 minutes in total care of the patient today, time spent in review of records, face-to-face consultation, coordination of care, discussion of patient on telephone with additional provider, placement of orders, completion of documentation.

## 2021-07-21 ENCOUNTER — OFFICE VISIT (OUTPATIENT)
Dept: ONCOLOGY | Facility: CLINIC | Age: 61
End: 2021-07-21

## 2021-07-21 ENCOUNTER — LAB (OUTPATIENT)
Dept: LAB | Facility: HOSPITAL | Age: 61
End: 2021-07-21

## 2021-07-21 VITALS
SYSTOLIC BLOOD PRESSURE: 106 MMHG | DIASTOLIC BLOOD PRESSURE: 58 MMHG | OXYGEN SATURATION: 92 % | BODY MASS INDEX: 45.99 KG/M2 | HEIGHT: 67 IN | WEIGHT: 293 LBS | RESPIRATION RATE: 17 BRPM | TEMPERATURE: 97.6 F | HEART RATE: 67 BPM

## 2021-07-21 DIAGNOSIS — C50.919 METASTATIC BREAST CANCER: ICD-10-CM

## 2021-07-21 DIAGNOSIS — D50.8 OTHER IRON DEFICIENCY ANEMIA: Primary | ICD-10-CM

## 2021-07-21 LAB
ABO GROUP BLD: NORMAL
ALBUMIN SERPL-MCNC: 2.8 G/DL (ref 3.5–5.2)
ALBUMIN/GLOB SERPL: 0.7 G/DL (ref 1.1–2.4)
ALP SERPL-CCNC: 73 U/L (ref 38–116)
ALT SERPL W P-5'-P-CCNC: 6 U/L (ref 0–33)
ANION GAP SERPL CALCULATED.3IONS-SCNC: 11.6 MMOL/L (ref 5–15)
AST SERPL-CCNC: 10 U/L (ref 0–32)
BASOPHILS # BLD AUTO: 0.03 10*3/MM3 (ref 0–0.2)
BASOPHILS NFR BLD AUTO: 0.7 % (ref 0–1.5)
BILIRUB SERPL-MCNC: 1.1 MG/DL (ref 0.2–1.2)
BLD GP AB SCN SERPL QL: NEGATIVE
BUN SERPL-MCNC: 26 MG/DL (ref 6–20)
BUN/CREAT SERPL: 7.4 (ref 7.3–30)
CALCIUM SPEC-SCNC: 8.1 MG/DL (ref 8.5–10.2)
CHLORIDE SERPL-SCNC: 98 MMOL/L (ref 98–107)
CO2 SERPL-SCNC: 23.4 MMOL/L (ref 22–29)
CREAT SERPL-MCNC: 3.49 MG/DL (ref 0.6–1.1)
DEPRECATED RDW RBC AUTO: 83.8 FL (ref 37–54)
EOSINOPHIL # BLD AUTO: 0.05 10*3/MM3 (ref 0–0.4)
EOSINOPHIL NFR BLD AUTO: 1.2 % (ref 0.3–6.2)
ERYTHROCYTE [DISTWIDTH] IN BLOOD BY AUTOMATED COUNT: 21.4 % (ref 12.3–15.4)
FERRITIN SERPL-MCNC: 646.5 NG/ML (ref 13–150)
FOLATE SERPL-MCNC: >20 NG/ML (ref 4.78–24.2)
GFR SERPL CREATININE-BSD FRML MDRD: 13 ML/MIN/1.73
GFR SERPL CREATININE-BSD FRML MDRD: ABNORMAL ML/MIN/{1.73_M2}
GLOBULIN UR ELPH-MCNC: 4 GM/DL (ref 1.8–3.5)
GLUCOSE SERPL-MCNC: 209 MG/DL (ref 74–124)
HCT VFR BLD AUTO: 20.8 % (ref 34–46.6)
HGB BLD-MCNC: 6.5 G/DL (ref 12–15.9)
IMM GRANULOCYTES # BLD AUTO: 0.05 10*3/MM3 (ref 0–0.05)
IMM GRANULOCYTES NFR BLD AUTO: 1.2 % (ref 0–0.5)
IRON 24H UR-MRATE: 17 MCG/DL (ref 37–145)
IRON SATN MFR SERPL: 9 % (ref 14–48)
LYMPHOCYTES # BLD AUTO: 0.56 10*3/MM3 (ref 0.7–3.1)
LYMPHOCYTES NFR BLD AUTO: 13.9 % (ref 19.6–45.3)
MCH RBC QN AUTO: 33.9 PG (ref 26.6–33)
MCHC RBC AUTO-ENTMCNC: 31.3 G/DL (ref 31.5–35.7)
MCV RBC AUTO: 108.3 FL (ref 79–97)
MONOCYTES # BLD AUTO: 0.2 10*3/MM3 (ref 0.1–0.9)
MONOCYTES NFR BLD AUTO: 5 % (ref 5–12)
NEUTROPHILS NFR BLD AUTO: 3.15 10*3/MM3 (ref 1.7–7)
NEUTROPHILS NFR BLD AUTO: 78 % (ref 42.7–76)
NRBC BLD AUTO-RTO: 0 /100 WBC (ref 0–0.2)
PLATELET # BLD AUTO: 147 10*3/MM3 (ref 140–450)
PMV BLD AUTO: 9.7 FL (ref 6–12)
POTASSIUM SERPL-SCNC: 4.4 MMOL/L (ref 3.5–4.7)
PROT SERPL-MCNC: 6.8 G/DL (ref 6.3–8)
RBC # BLD AUTO: 1.92 10*6/MM3 (ref 3.77–5.28)
RH BLD: POSITIVE
SODIUM SERPL-SCNC: 133 MMOL/L (ref 134–145)
T&S EXPIRATION DATE: NORMAL
TIBC SERPL-MCNC: 193 MCG/DL (ref 249–505)
TRANSFERRIN SERPL-MCNC: 138 MG/DL (ref 200–360)
VIT B12 BLD-MCNC: 690 PG/ML (ref 211–946)
WBC # BLD AUTO: 4.04 10*3/MM3 (ref 3.4–10.8)

## 2021-07-21 PROCEDURE — 86923 COMPATIBILITY TEST ELECTRIC: CPT

## 2021-07-21 PROCEDURE — 80053 COMPREHEN METABOLIC PANEL: CPT

## 2021-07-21 PROCEDURE — 85025 COMPLETE CBC W/AUTO DIFF WBC: CPT

## 2021-07-21 PROCEDURE — 82728 ASSAY OF FERRITIN: CPT | Performed by: INTERNAL MEDICINE

## 2021-07-21 PROCEDURE — 84466 ASSAY OF TRANSFERRIN: CPT | Performed by: INTERNAL MEDICINE

## 2021-07-21 PROCEDURE — 86850 RBC ANTIBODY SCREEN: CPT | Performed by: INTERNAL MEDICINE

## 2021-07-21 PROCEDURE — 82746 ASSAY OF FOLIC ACID SERUM: CPT | Performed by: INTERNAL MEDICINE

## 2021-07-21 PROCEDURE — 99215 OFFICE O/P EST HI 40 MIN: CPT | Performed by: INTERNAL MEDICINE

## 2021-07-21 PROCEDURE — 86901 BLOOD TYPING SEROLOGIC RH(D): CPT | Performed by: INTERNAL MEDICINE

## 2021-07-21 PROCEDURE — 82607 VITAMIN B-12: CPT | Performed by: INTERNAL MEDICINE

## 2021-07-21 PROCEDURE — 83540 ASSAY OF IRON: CPT | Performed by: INTERNAL MEDICINE

## 2021-07-21 PROCEDURE — 86900 BLOOD TYPING SEROLOGIC ABO: CPT | Performed by: INTERNAL MEDICINE

## 2021-07-21 PROCEDURE — 36415 COLL VENOUS BLD VENIPUNCTURE: CPT

## 2021-07-21 RX ORDER — ACETAMINOPHEN 325 MG/1
325 TABLET ORAL ONCE
Status: CANCELLED | OUTPATIENT
Start: 2021-07-22 | End: 2021-07-21

## 2021-07-21 RX ORDER — SODIUM CHLORIDE 9 MG/ML
250 INJECTION, SOLUTION INTRAVENOUS AS NEEDED
Status: CANCELLED | OUTPATIENT
Start: 2021-07-22

## 2021-07-21 NOTE — PROGRESS NOTES
Preliminary results were reviewed with patient in office.  Left voicemail with patient confirming that those results had not changed from final results.  Encouraged to call for questions.

## 2021-07-22 ENCOUNTER — HOSPITAL ENCOUNTER (OUTPATIENT)
Dept: INFUSION THERAPY | Facility: HOSPITAL | Age: 61
Setting detail: INFUSION SERIES
Discharge: HOME | End: 2021-07-22

## 2021-07-22 VITALS
RESPIRATION RATE: 18 BRPM | OXYGEN SATURATION: 97 % | SYSTOLIC BLOOD PRESSURE: 132 MMHG | HEART RATE: 61 BPM | TEMPERATURE: 97.8 F | DIASTOLIC BLOOD PRESSURE: 62 MMHG

## 2021-07-22 DIAGNOSIS — D50.8 OTHER IRON DEFICIENCY ANEMIA: ICD-10-CM

## 2021-07-22 PROCEDURE — 63710000001 DIPHENHYDRAMINE PER 50 MG: Performed by: INTERNAL MEDICINE

## 2021-07-22 PROCEDURE — 86900 BLOOD TYPING SEROLOGIC ABO: CPT

## 2021-07-22 PROCEDURE — 36430 TRANSFUSION BLD/BLD COMPNT: CPT

## 2021-07-22 PROCEDURE — P9016 RBC LEUKOCYTES REDUCED: HCPCS

## 2021-07-22 RX ORDER — ACETAMINOPHEN 325 MG/1
325 TABLET ORAL ONCE
Status: COMPLETED | OUTPATIENT
Start: 2021-07-22 | End: 2021-07-22

## 2021-07-22 RX ORDER — SODIUM CHLORIDE 9 MG/ML
250 INJECTION, SOLUTION INTRAVENOUS AS NEEDED
Status: DISCONTINUED | OUTPATIENT
Start: 2021-07-22 | End: 2021-07-24 | Stop reason: HOSPADM

## 2021-07-22 RX ORDER — DIPHENHYDRAMINE HCL 25 MG
25 CAPSULE ORAL ONCE
Status: COMPLETED | OUTPATIENT
Start: 2021-07-22 | End: 2021-07-22

## 2021-07-22 RX ADMIN — DIPHENHYDRAMINE HYDROCHLORIDE 25 MG: 25 CAPSULE ORAL at 10:31

## 2021-07-22 RX ADMIN — ACETAMINOPHEN 325 MG: 325 TABLET, FILM COATED ORAL at 10:31

## 2021-07-22 NOTE — PROGRESS NOTES
Pt tolerated blood transfusion without any issues.  Up to bathroom as needed per self.  Pt able to walk short distances without motorized wheelchair.  AVS given & pt DC per motorized wheelchair after completion of visit.

## 2021-07-22 NOTE — PATIENT INSTRUCTIONS
Blood Transfusion, Adult, Care After  This sheet gives you information about how to care for yourself after your procedure. Your doctor may also give you more specific instructions. If you have problems or questions, contact your doctor.  What can I expect after the procedure?  After the procedure, it is common to have:  · Bruising and soreness at the IV site.  · A fever or chills on the day of the procedure. This may be your body's response to the new blood cells received.  · A headache.  Follow these instructions at home:  Insertion site care         · Follow instructions from your doctor about how to take care of your insertion site. This is where an IV tube was put into your vein. Make sure you:  ? Wash your hands with soap and water before and after you change your bandage (dressing). If you cannot use soap and water, use hand .  ? Change your bandage as told by your doctor.  · Check your insertion site every day for signs of infection. Check for:  ? Redness, swelling, or pain.  ? Bleeding from the site.  ? Warmth.  ? Pus or a bad smell.  General instructions  · Take over-the-counter and prescription medicines only as told by your doctor.  · Rest as told by your doctor.  · Go back to your normal activities as told by your doctor.  · Keep all follow-up visits as told by your doctor. This is important.  Contact a doctor if:  · You have itching or red, swollen areas of skin (hives).  · You feel worried or nervous (anxious).  · You feel weak after doing your normal activities.  · You have redness, swelling, warmth, or pain around the insertion site.  · You have blood coming from the insertion site, and the blood does not stop with pressure.  · You have pus or a bad smell coming from the insertion site.  Get help right away if:  · You have signs of a serious reaction. This may be coming from an allergy or the body's defense system (immune system). Signs include:  ? Trouble breathing or shortness of  "breath.  ? Swelling of the face or feeling warm (flushed).  ? Fever or chills.  ? Head, chest, or back pain.  ? Dark pee (urine) or blood in the pee.  ? Widespread rash.  ? Fast heartbeat.  ? Feeling dizzy or light-headed.  You may receive your blood transfusion in an outpatient setting. If so, you will be told whom to contact to report any reactions.  These symptoms may be an emergency. Do not wait to see if the symptoms will go away. Get medical help right away. Call your local emergency services (911 in the [place] Do not drive yourself to the hospital.  Summary  · Bruising and soreness at the IV site are common.  · Check your insertion site every day for signs of infection.  · Rest as told by your doctor. Go back to your normal activities as told by your doctor.  · Get help right away if you have signs of a serious reaction.  This information is not intended to replace advice given to you by your health care provider. Make sure you discuss any questions you have with your health care provider.  Document Revised: 06/11/2020 Document Reviewed: 06/11/2020  [employer] Patient Education © 2021 [employer]    https://www.[facility].org/donate-blood/blood-donation-process/what-happens-to-donated-blood/blood-transfusions/types-of-blood-transfusions.html\"> https://www.hematology.org/education/patients/blood-basics/blood-safety-and-matching\"> https://www.nhlbi.nih.gov/health-topics/blood-transfusion\">   Blood Transfusion, Adult  A blood transfusion is a procedure in which you receive blood or a type of blood cell (blood component) through an IV. You may need a blood transfusion when your blood level is low. This may result from a bleeding disorder, illness, injury, or surgery. The blood may come from a donor. You may also be able to donate blood for yourself (autologous blood donation) before a planned surgery.  The blood given in a transfusion is made up of different blood components. You may receive:  · Red blood " cells. These carry oxygen to the cells in the body.  · Platelets. These help your blood to clot.  · Plasma. This is the liquid part of your blood. It carries proteins and other substances throughout the body.  · White blood cells. These help you fight infections.  If you have hemophilia or another clotting disorder, you may also receive other types of blood products.  Tell a health care provider about:  · Any blood disorders you have.  · Any previous reactions you have had during a blood transfusion.  · Any allergies you have.  · All medicines you are taking, including vitamins, herbs, eye drops, creams, and over-the-counter medicines.  · Any surgeries you have had.  · Any medical conditions you have, including any recent fever or cold symptoms.  · Whether you are pregnant or may be pregnant.  What are the risks?  Generally, this is a safe procedure. However, problems may occur.  · The most common problems include:  ? A mild allergic reaction, such as red, swollen areas of skin (hives) and itching.  ? Fever or chills. This may be the body's response to new blood cells received. This may occur during or up to 4 hours after the transfusion.  · More serious problems may include:  ? Transfusion-associated circulatory overload (TACO), or too much fluid in the lungs. This may cause breathing problems.  ? A serious allergic reaction, such as difficulty breathing or swelling around the face and lips.  ? Transfusion-related acute lung injury (TRALI), which causes breathing difficulty and low oxygen in the blood. This can occur within hours of the transfusion or several days later.  ? Iron overload. This can happen after receiving many blood transfusions over a period of time.  ? Infection or virus being transmitted. This is rare because donated blood is carefully tested before it is given.  ? Hemolytic transfusion reaction. This is rare. It happens when your body's defense system (immune system)tries to attack the new blood  cells. Symptoms may include fever, chills, nausea, low blood pressure, and low back or chest pain.  ? Transfusion-associated wvhjb-wtijch-lnxp disease (TAGVHD). This is rare. It happens when donated cells attack your body's healthy tissues.  What happens before the procedure?  Medicines  Ask your health care provider about:  · Changing or stopping your regular medicines. This is especially important if you are taking diabetes medicines or blood thinners.  · Taking medicines such as aspirin and ibuprofen. These medicines can thin your blood. Do not take these medicines unless your health care provider tells you to take them.  · Taking over-the-counter medicines, vitamins, herbs, and supplements.  General instructions  · Follow instructions from your health care provider about eating and drinking restrictions.  · You will have a blood test to determine your blood type. This is necessary to know what kind of blood your body will accept and to match it to the donor blood.  · If you are going to have a planned surgery, you may be able to do an autologous blood donation. This may be done in case you need to have a transfusion.  · You will have your temperature, blood pressure, and pulse monitored before the transfusion.  · If you have had an allergic reaction to a transfusion in the past, you may be given medicine to help prevent a reaction. This medicine may be given to you by mouth (orally) or through an IV.  · Set aside time for the blood transfusion. This procedure generally takes 1-4 hours to complete.  What happens during the procedure?    · An IV will be inserted into one of your veins.  · The bag of donated blood will be attached to your IV. The blood will then enter through your vein.  · Your temperature, blood pressure, and pulse will be monitored regularly during the transfusion. This monitoring is done to detect early signs of a transfusion reaction.  · Tell your nurse right away if you have any of these  symptoms during the transfusion:  ? Shortness of breath or trouble breathing.  ? Chest or back pain.  ? Fever or chills.  ? Hives or itching.  · If you have any signs or symptoms of a reaction, your transfusion will be stopped and you may be given medicine.  · When the transfusion is complete, your IV will be removed.  · Pressure may be applied to the IV site for a few minutes.  · A bandage (dressing)will be applied.  The procedure may vary among health care providers and hospitals.  What happens after the procedure?  · Your temperature, blood pressure, pulse, breathing rate, and blood oxygen level will be monitored until you leave the hospital or clinic.  · Your blood may be tested to see how you are responding to the transfusion.  · You may be warmed with fluids or blankets to maintain a normal body temperature.  · If you receive your blood transfusion in an outpatient setting, you will be told whom to contact to report any reactions.  Where to find more information  For more information on blood transfusions, visit the [employer]: [facility]org  Summary  · A blood transfusion is a procedure in which you receive blood or a type of blood cell (blood component) through an IV.  · The blood you receive may come from a donor or be donated by yourself (autologous blood donation) before a planned surgery.  · The blood given in a transfusion is made up of different blood components. You may receive red blood cells, platelets, plasma, or white blood cells depending on the condition treated.  · Your temperature, blood pressure, and pulse will be monitored before, during, and after the transfusion.  · After the transfusion, your blood may be tested to see how your body has responded.  This information is not intended to replace advice given to you by your health care provider. Make sure you discuss any questions you have with your health care provider.  Document Revised: 10/22/2020 Document Reviewed:  06/11/2020  [employer] Patient Education © 2021 [employer]

## 2021-07-23 ENCOUNTER — APPOINTMENT (OUTPATIENT)
Dept: INFUSION THERAPY | Facility: HOSPITAL | Age: 61
End: 2021-07-23

## 2021-07-27 ENCOUNTER — TRANSCRIBE ORDERS (OUTPATIENT)
Dept: ADMINISTRATIVE | Facility: HOSPITAL | Age: 61
End: 2021-07-27

## 2021-07-27 DIAGNOSIS — N20.0 KIDNEY STONES: Primary | ICD-10-CM

## 2021-08-03 ENCOUNTER — TELEMEDICINE (OUTPATIENT)
Dept: FAMILY MEDICINE CLINIC | Facility: CLINIC | Age: 61
End: 2021-08-03

## 2021-08-03 DIAGNOSIS — E03.9 ACQUIRED HYPOTHYROIDISM: ICD-10-CM

## 2021-08-03 DIAGNOSIS — F41.9 ANXIETY AND DEPRESSION: Primary | ICD-10-CM

## 2021-08-03 DIAGNOSIS — E78.2 MIXED HYPERLIPIDEMIA: ICD-10-CM

## 2021-08-03 DIAGNOSIS — F32.A ANXIETY AND DEPRESSION: Primary | ICD-10-CM

## 2021-08-03 DIAGNOSIS — Z79.4 CONTROLLED TYPE 2 DIABETES MELLITUS WITHOUT COMPLICATION, WITH LONG-TERM CURRENT USE OF INSULIN (HCC): ICD-10-CM

## 2021-08-03 DIAGNOSIS — E11.9 CONTROLLED TYPE 2 DIABETES MELLITUS WITHOUT COMPLICATION, WITH LONG-TERM CURRENT USE OF INSULIN (HCC): ICD-10-CM

## 2021-08-03 PROCEDURE — 99213 OFFICE O/P EST LOW 20 MIN: CPT | Performed by: NURSE PRACTITIONER

## 2021-08-03 RX ORDER — PEN NEEDLE, DIABETIC 32GX 5/32"
1 NEEDLE, DISPOSABLE MISCELLANEOUS DAILY
Qty: 100 EACH | Refills: 3 | Status: SHIPPED | OUTPATIENT
Start: 2021-08-03 | End: 2021-12-13

## 2021-08-03 RX ORDER — ATORVASTATIN CALCIUM 40 MG/1
40 TABLET, FILM COATED ORAL DAILY
Qty: 90 TABLET | Refills: 1 | Status: SHIPPED | OUTPATIENT
Start: 2021-08-03 | End: 2022-02-15 | Stop reason: SDUPTHER

## 2021-08-03 RX ORDER — INSULIN DETEMIR 100 [IU]/ML
40 INJECTION, SOLUTION SUBCUTANEOUS 2 TIMES DAILY
Qty: 3 PEN | Refills: 5 | Status: SHIPPED | OUTPATIENT
Start: 2021-08-03 | End: 2022-02-15 | Stop reason: SDUPTHER

## 2021-08-03 RX ORDER — LEVOTHYROXINE SODIUM 0.05 MG/1
50 TABLET ORAL DAILY
Qty: 90 TABLET | Refills: 1 | Status: SHIPPED | OUTPATIENT
Start: 2021-08-03 | End: 2022-02-14

## 2021-08-03 NOTE — PROGRESS NOTES
Subjective   Juana Hall is a 60 y.o. female.   You have chosen to receive care through a telehealth visit.  Do you consent to use a video/audio connection for your medical care today? Yes  History of Present Illness    Since the last visit, she has overall felt anxious.  She has Essential Hypertension and well controlled on current medication, DMII well controlled on medication and will continue regimen, Hyperlipidemia with goals met with current Rx and Hypothyroidism well controlled on current medication and will continue Rx.  she has been compliant with current medications have reviewed them.  The patient denies medication side effects.  Will refill medications. LMP  (LMP Unknown)     Results for orders placed or performed during the hospital encounter of 07/22/21   Type and screen    Specimen: Blood   Result Value Ref Range    ABO Type A     RH type Positive     Antibody Screen Negative     T&S Expiration Date 7/24/2021 11:59:59 PM    Prepare RBC, 2 Units   Result Value Ref Range    Product Code J9257I34     Unit Number [id number]     UNIT  ABO A     UNIT  RH POS     Crossmatch Interpretation Compatible     Dispense Status PT     Blood Expiration Date 202108212359     Blood Type Barcode 6200     Product Code Y4410Y26     Unit Number [id number]     UNIT  ABO A     UNIT  RH POS     Crossmatch Interpretation Compatible     Dispense Status PT     Blood Expiration Date 202108212359     Blood Type Barcode 6200        She is UTD with her nephrologist Dr. Antonio and is having dialysis regularly 3 days per week with [facility].  Her last GFR was 13 and creatinine was 3.49.    She is UTD with Dr. Irvin for anemia of chronic disease and for metastatic breast cancer. She had 2 units PRBC on 7/22.      She stopped Lexapro as her blood glucose increased over 500 and she was unsure if it was from medication.   The following portions of the patient's history were reviewed and updated as appropriate: allergies, current  medications, past family history, past medical history, past social history, past surgical history and problem list.    Review of Systems   Constitutional: Positive for fatigue. Negative for unexpected weight change.   Respiratory: Negative for shortness of breath.    Cardiovascular: Negative for chest pain and palpitations.   Psychiatric/Behavioral: Positive for dysphoric mood. Negative for behavioral problems, self-injury and suicidal ideas. The patient is nervous/anxious.        Objective   Physical Exam  Vitals reviewed.   Constitutional:       Appearance: She is well-developed.   Pulmonary:      Effort: Pulmonary effort is normal.   Neurological:      Mental Status: She is alert and oriented to person, place, and time.   Psychiatric:         Mood and Affect: Mood normal.         Behavior: Behavior normal.         Thought Content: Thought content normal.         Judgment: Judgment normal.         Assessment/Plan   Diagnoses and all orders for this visit:    1. Anxiety and depression (Primary)  -     sertraline (Zoloft) 50 MG tablet; Take 1 tablet by mouth Daily.  Dispense: 30 tablet; Refill: 1    2. Acquired hypothyroidism  -     levothyroxine (SYNTHROID, LEVOTHROID) 50 MCG tablet; Take 1 tablet by mouth Daily.  Dispense: 90 tablet; Refill: 1    3. Controlled type 2 diabetes mellitus without complication, with long-term current use of insulin (CMS/[facility])  -     Levemir FlexTouch 100 UNIT/ML injection; Inject 40 Units under the skin into the appropriate area as directed 2 (Two) Times a Day.  Dispense: 3 pen; Refill: 5  -     Insulin Pen Needle (BD Pen Needle Gini U/F) 32G X 4 MM misc; 1 each Daily.  Dispense: 100 each; Refill: 3  -     glucose blood test strip; 1 each by Other route 2 (two) times a day. One touch ultra 2 test strips  Check blood sugar 2 times daily  Dispense: 100 each; Refill: 1  -     Dulaglutide 1.5 MG/0.5ML solution pen-injector; Inject 1.5 mg under the skin into the appropriate area as directed  1 (One) Time Per Week. MONDAYS  Dispense: 12 pen; Refill: 1    4. Mixed hyperlipidemia  -     atorvastatin (LIPITOR) 40 MG tablet; Take 1 tablet by mouth Daily.  Dispense: 90 tablet; Refill: 1      Started sertraline for anxiety.  F/u in 4 weeks for recheck or sooner if needed.          This visit has been rescheduled as a phone/video visit to comply with patient safety concerns in accordance with CDC recommendations. Total time of encounter was 13 minutes.

## 2021-08-04 ENCOUNTER — LAB (OUTPATIENT)
Dept: LAB | Facility: HOSPITAL | Age: 61
End: 2021-08-04

## 2021-08-04 ENCOUNTER — CLINICAL SUPPORT (OUTPATIENT)
Dept: ONCOLOGY | Facility: HOSPITAL | Age: 61
End: 2021-08-04

## 2021-08-04 DIAGNOSIS — N18.30 ANEMIA DUE TO STAGE 3 CHRONIC KIDNEY DISEASE TREATED WITH ERYTHROPOIETIN (HCC): ICD-10-CM

## 2021-08-04 DIAGNOSIS — C50.919 METASTATIC BREAST CANCER: ICD-10-CM

## 2021-08-04 DIAGNOSIS — D63.1 ANEMIA DUE TO STAGE 3 CHRONIC KIDNEY DISEASE TREATED WITH ERYTHROPOIETIN (HCC): ICD-10-CM

## 2021-08-04 DIAGNOSIS — D50.9 IRON DEFICIENCY ANEMIA, UNSPECIFIED IRON DEFICIENCY ANEMIA TYPE: ICD-10-CM

## 2021-08-04 DIAGNOSIS — E11.22 STAGE 3 CHRONIC RENAL IMPAIRMENT ASSOCIATED WITH TYPE 2 DIABETES MELLITUS (HCC): ICD-10-CM

## 2021-08-04 DIAGNOSIS — N18.30 STAGE 3 CHRONIC RENAL IMPAIRMENT ASSOCIATED WITH TYPE 2 DIABETES MELLITUS (HCC): ICD-10-CM

## 2021-08-04 LAB
ALBUMIN SERPL-MCNC: 3.1 G/DL (ref 3.5–5.2)
ALBUMIN/GLOB SERPL: 0.8 G/DL (ref 1.1–2.4)
ALP SERPL-CCNC: 91 U/L (ref 38–116)
ALT SERPL W P-5'-P-CCNC: 10 U/L (ref 0–33)
ANION GAP SERPL CALCULATED.3IONS-SCNC: 12.6 MMOL/L (ref 5–15)
AST SERPL-CCNC: 10 U/L (ref 0–32)
BASOPHILS # BLD AUTO: 0.08 10*3/MM3 (ref 0–0.2)
BASOPHILS NFR BLD AUTO: 2.3 % (ref 0–1.5)
BILIRUB SERPL-MCNC: 0.4 MG/DL (ref 0.2–1.2)
BUN SERPL-MCNC: 23 MG/DL (ref 6–20)
BUN/CREAT SERPL: 6.2 (ref 7.3–30)
CALCIUM SPEC-SCNC: 8.7 MG/DL (ref 8.5–10.2)
CHLORIDE SERPL-SCNC: 98 MMOL/L (ref 98–107)
CO2 SERPL-SCNC: 23.4 MMOL/L (ref 22–29)
CREAT SERPL-MCNC: 3.72 MG/DL (ref 0.6–1.1)
DEPRECATED RDW RBC AUTO: 71.5 FL (ref 37–54)
EOSINOPHIL # BLD AUTO: 0.09 10*3/MM3 (ref 0–0.4)
EOSINOPHIL NFR BLD AUTO: 2.6 % (ref 0.3–6.2)
ERYTHROCYTE [DISTWIDTH] IN BLOOD BY AUTOMATED COUNT: 19.5 % (ref 12.3–15.4)
GFR SERPL CREATININE-BSD FRML MDRD: 12 ML/MIN/1.73
GFR SERPL CREATININE-BSD FRML MDRD: ABNORMAL ML/MIN/{1.73_M2}
GLOBULIN UR ELPH-MCNC: 4.1 GM/DL (ref 1.8–3.5)
GLUCOSE SERPL-MCNC: 277 MG/DL (ref 74–124)
HCT VFR BLD AUTO: 26.2 % (ref 34–46.6)
HGB BLD-MCNC: 8.2 G/DL (ref 12–15.9)
IMM GRANULOCYTES # BLD AUTO: 0.03 10*3/MM3 (ref 0–0.05)
IMM GRANULOCYTES NFR BLD AUTO: 0.9 % (ref 0–0.5)
LYMPHOCYTES # BLD AUTO: 0.62 10*3/MM3 (ref 0.7–3.1)
LYMPHOCYTES NFR BLD AUTO: 17.9 % (ref 19.6–45.3)
MCH RBC QN AUTO: 32.4 PG (ref 26.6–33)
MCHC RBC AUTO-ENTMCNC: 31.3 G/DL (ref 31.5–35.7)
MCV RBC AUTO: 103.6 FL (ref 79–97)
MONOCYTES # BLD AUTO: 0.15 10*3/MM3 (ref 0.1–0.9)
MONOCYTES NFR BLD AUTO: 4.3 % (ref 5–12)
NEUTROPHILS NFR BLD AUTO: 2.5 10*3/MM3 (ref 1.7–7)
NEUTROPHILS NFR BLD AUTO: 72 % (ref 42.7–76)
NRBC BLD AUTO-RTO: 0 /100 WBC (ref 0–0.2)
PLATELET # BLD AUTO: 377 10*3/MM3 (ref 140–450)
PMV BLD AUTO: 8.7 FL (ref 6–12)
POTASSIUM SERPL-SCNC: 4.1 MMOL/L (ref 3.5–4.7)
PROT SERPL-MCNC: 7.2 G/DL (ref 6.3–8)
RBC # BLD AUTO: 2.53 10*6/MM3 (ref 3.77–5.28)
SODIUM SERPL-SCNC: 134 MMOL/L (ref 134–145)
WBC # BLD AUTO: 3.47 10*3/MM3 (ref 3.4–10.8)

## 2021-08-04 PROCEDURE — 80053 COMPREHEN METABOLIC PANEL: CPT

## 2021-08-04 PROCEDURE — G0463 HOSPITAL OUTPT CLINIC VISIT: HCPCS

## 2021-08-04 PROCEDURE — 85025 COMPLETE CBC W/AUTO DIFF WBC: CPT

## 2021-08-04 PROCEDURE — 36415 COLL VENOUS BLD VENIPUNCTURE: CPT

## 2021-08-04 NOTE — NURSING NOTE
Lab Results   Component Value Date    WBC 3.47 08/04/2021    HGB 8.2 (L) 08/04/2021    HCT 26.2 (L) 08/04/2021    .6 (H) 08/04/2021     08/04/2021     Pt is here for lab with RN review.  CBC reviewed with pt, counts are stable for this pt at this time. Pt has no complaints except for shortness of air which is normal for her. She is currently on her Ibrance with 1 week remaining on this cycle. She states that her Procrit dose has been increased by her nephrologist.   Copy of CBC results given to pt and f/u appt reviewed. [facility]. Pt is instructed to call the office with any concerns or new symptoms prior to next visit. Pete horner

## 2021-08-18 ENCOUNTER — TELEPHONE (OUTPATIENT)
Dept: GENERAL RADIOLOGY | Facility: HOSPITAL | Age: 61
End: 2021-08-18

## 2021-08-18 ENCOUNTER — OFFICE VISIT (OUTPATIENT)
Dept: ONCOLOGY | Facility: CLINIC | Age: 61
End: 2021-08-18

## 2021-08-18 ENCOUNTER — LAB (OUTPATIENT)
Dept: LAB | Facility: HOSPITAL | Age: 61
End: 2021-08-18

## 2021-08-18 VITALS
BODY MASS INDEX: 60.12 KG/M2 | SYSTOLIC BLOOD PRESSURE: 127 MMHG | OXYGEN SATURATION: 96 % | DIASTOLIC BLOOD PRESSURE: 65 MMHG | HEART RATE: 62 BPM | RESPIRATION RATE: 18 BRPM | HEIGHT: 67 IN

## 2021-08-18 DIAGNOSIS — D63.1 ANEMIA DUE TO CHRONIC KIDNEY DISEASE, ON CHRONIC DIALYSIS (HCC): ICD-10-CM

## 2021-08-18 DIAGNOSIS — Z99.2 ANEMIA DUE TO CHRONIC KIDNEY DISEASE, ON CHRONIC DIALYSIS (HCC): ICD-10-CM

## 2021-08-18 DIAGNOSIS — N18.30 ANEMIA IN STAGE 3 CHRONIC KIDNEY DISEASE, UNSPECIFIED WHETHER STAGE 3A OR 3B CKD (HCC): Primary | ICD-10-CM

## 2021-08-18 DIAGNOSIS — D63.1 ANEMIA IN STAGE 3 CHRONIC KIDNEY DISEASE, UNSPECIFIED WHETHER STAGE 3A OR 3B CKD (HCC): Primary | ICD-10-CM

## 2021-08-18 DIAGNOSIS — Z79.899 HIGH RISK MEDICATION USE: ICD-10-CM

## 2021-08-18 DIAGNOSIS — C50.919 METASTATIC BREAST CANCER: ICD-10-CM

## 2021-08-18 DIAGNOSIS — N18.6 ANEMIA DUE TO CHRONIC KIDNEY DISEASE, ON CHRONIC DIALYSIS (HCC): ICD-10-CM

## 2021-08-18 LAB
ABO GROUP BLD: NORMAL
ALBUMIN SERPL-MCNC: 3.1 G/DL (ref 3.5–5.2)
ALBUMIN/GLOB SERPL: 0.8 G/DL (ref 1.1–2.4)
ALP SERPL-CCNC: 84 U/L (ref 38–116)
ALT SERPL W P-5'-P-CCNC: 10 U/L (ref 0–33)
ANION GAP SERPL CALCULATED.3IONS-SCNC: 11.6 MMOL/L (ref 5–15)
AST SERPL-CCNC: 17 U/L (ref 0–32)
BASOPHILS # BLD AUTO: 0.05 10*3/MM3 (ref 0–0.2)
BASOPHILS NFR BLD AUTO: 2.4 % (ref 0–1.5)
BILIRUB SERPL-MCNC: 0.6 MG/DL (ref 0.2–1.2)
BLD GP AB SCN SERPL QL: NEGATIVE
BUN SERPL-MCNC: 23 MG/DL (ref 6–20)
BUN/CREAT SERPL: 6.5 (ref 7.3–30)
CALCIUM SPEC-SCNC: 8.5 MG/DL (ref 8.5–10.2)
CHLORIDE SERPL-SCNC: 97 MMOL/L (ref 98–107)
CO2 SERPL-SCNC: 25.4 MMOL/L (ref 22–29)
CREAT SERPL-MCNC: 3.52 MG/DL (ref 0.6–1.1)
DEPRECATED RDW RBC AUTO: 81.6 FL (ref 37–54)
EOSINOPHIL # BLD AUTO: 0.04 10*3/MM3 (ref 0–0.4)
EOSINOPHIL NFR BLD AUTO: 1.9 % (ref 0.3–6.2)
ERYTHROCYTE [DISTWIDTH] IN BLOOD BY AUTOMATED COUNT: 21 % (ref 12.3–15.4)
GFR SERPL CREATININE-BSD FRML MDRD: 13 ML/MIN/1.73
GFR SERPL CREATININE-BSD FRML MDRD: ABNORMAL ML/MIN/{1.73_M2}
GLOBULIN UR ELPH-MCNC: 4 GM/DL (ref 1.8–3.5)
GLUCOSE SERPL-MCNC: 158 MG/DL (ref 74–124)
HCT VFR BLD AUTO: 23.3 % (ref 34–46.6)
HGB BLD-MCNC: 7.4 G/DL (ref 12–15.9)
IMM GRANULOCYTES # BLD AUTO: 0.01 10*3/MM3 (ref 0–0.05)
IMM GRANULOCYTES NFR BLD AUTO: 0.5 % (ref 0–0.5)
LYMPHOCYTES # BLD AUTO: 0.57 10*3/MM3 (ref 0.7–3.1)
LYMPHOCYTES NFR BLD AUTO: 26.9 % (ref 19.6–45.3)
MCH RBC QN AUTO: 33.6 PG (ref 26.6–33)
MCHC RBC AUTO-ENTMCNC: 31.8 G/DL (ref 31.5–35.7)
MCV RBC AUTO: 105.9 FL (ref 79–97)
MONOCYTES # BLD AUTO: 0.28 10*3/MM3 (ref 0.1–0.9)
MONOCYTES NFR BLD AUTO: 13.2 % (ref 5–12)
NEUTROPHILS NFR BLD AUTO: 1.17 10*3/MM3 (ref 1.7–7)
NEUTROPHILS NFR BLD AUTO: 55.1 % (ref 42.7–76)
NRBC BLD AUTO-RTO: 0 /100 WBC (ref 0–0.2)
PLATELET # BLD AUTO: 174 10*3/MM3 (ref 140–450)
PMV BLD AUTO: 9 FL (ref 6–12)
POTASSIUM SERPL-SCNC: 4 MMOL/L (ref 3.5–4.7)
PROT SERPL-MCNC: 7.1 G/DL (ref 6.3–8)
RBC # BLD AUTO: 2.2 10*6/MM3 (ref 3.77–5.28)
RH BLD: POSITIVE
SODIUM SERPL-SCNC: 134 MMOL/L (ref 134–145)
T&S EXPIRATION DATE: NORMAL
WBC # BLD AUTO: 2.12 10*3/MM3 (ref 3.4–10.8)

## 2021-08-18 PROCEDURE — 85025 COMPLETE CBC W/AUTO DIFF WBC: CPT

## 2021-08-18 PROCEDURE — 86900 BLOOD TYPING SEROLOGIC ABO: CPT | Performed by: NURSE PRACTITIONER

## 2021-08-18 PROCEDURE — 99214 OFFICE O/P EST MOD 30 MIN: CPT | Performed by: NURSE PRACTITIONER

## 2021-08-18 PROCEDURE — 36415 COLL VENOUS BLD VENIPUNCTURE: CPT

## 2021-08-18 PROCEDURE — 80053 COMPREHEN METABOLIC PANEL: CPT

## 2021-08-18 PROCEDURE — 86850 RBC ANTIBODY SCREEN: CPT | Performed by: NURSE PRACTITIONER

## 2021-08-18 PROCEDURE — 86901 BLOOD TYPING SEROLOGIC RH(D): CPT | Performed by: NURSE PRACTITIONER

## 2021-08-18 PROCEDURE — 86923 COMPATIBILITY TEST ELECTRIC: CPT

## 2021-08-18 RX ORDER — DIPHENHYDRAMINE HCL 25 MG
25 CAPSULE ORAL ONCE
Status: CANCELLED | OUTPATIENT
Start: 2021-08-21 | End: 2021-08-18

## 2021-08-18 RX ORDER — ACETAMINOPHEN 325 MG/1
325 TABLET ORAL ONCE
Status: CANCELLED | OUTPATIENT
Start: 2021-08-21 | End: 2021-08-18

## 2021-08-18 NOTE — PROGRESS NOTES
"Chief Complaint  Metastatic lobular breast cancer (ER/IA positive, HER-2/tj negative), anemia secondary to CKD3, CHF, peripheral neuropathy, chemotherapy related nausea chemotherapy-induced myelosuppression    Subjective        History of Present Illness  The patient returns today in follow-up with laboratory studies to review, continuing on Aromasin 25 mg daily and Ibrance 100 mg daily for 21/28 days.  Patient is currently on her off week of Ibrance and will be due to restart on 8/23/2021.    When patient was seen last month she was more short of breath and hemoglobin notably much lower at 6.5.  Dr. Irvin spoke with patient's nephrologist, Dr. Tai Ledesma who stated he would check to make sure the patient was receiving maximum dose of Epogen with dialysis.  Patient did receive transfusion on 7/22/2021 following dialysis.  Repeat hemoglobin 2 weeks later increased to 8.2.    As she is reviewed back today hemoglobin has dropped to 7.4.  She is symptomatic again with worsening fatigue.  We previously discussed with her concerns that perhaps Ibrance is contributing to her worsening anemia.  Notably her ANC is also lower today at 1.1, WBC 2.12.  She is currently on her off week of Ibrance, due to begin treatment again 8/23/2021.    Patient also continues on dialysis on Tuesdays, Thursdays, and Saturdays.  She does experience worsening fatigue after dialysis.    She denies other concerns at this time.    Objective   Vital Signs:   /65   Pulse 62   Resp 18   Ht 170.2 cm (67.01\")   SpO2 96%   BMI 60.12 kg/m²     Physical Exam  Constitutional:       Appearance: She is well-developed. She is obese.      Comments: Patient is in a motorized scooter today   Eyes:      Conjunctiva/sclera: Conjunctivae normal.   Neck:      Thyroid: No thyromegaly.   Cardiovascular:      Rate and Rhythm: Normal rate and regular rhythm.      Heart sounds: Murmur heard.   No friction rub. No gallop.       Comments: 2/6 murmur  Pulmonary: "      Effort: No respiratory distress.      Breath sounds: Normal breath sounds.      Comments: Dialysis access in place in the right subclavian position  Abdominal:      General: Bowel sounds are normal. There is no distension.      Palpations: Abdomen is soft.      Tenderness: There is no abdominal tenderness.   Musculoskeletal:         General: Swelling present.      Comments: Trace bilateral lower extremity edema, improved from prior exam   Lymphadenopathy:      Head:      Right side of head: No submandibular adenopathy.      Cervical: No cervical adenopathy.      Upper Body:      Right upper body: No supraclavicular adenopathy.      Left upper body: No supraclavicular adenopathy.   Skin:     General: Skin is warm and dry.      Findings: No bruising or rash.   Neurological:      Mental Status: She is alert and oriented to person, place, and time.      Cranial Nerves: No cranial nerve deficit.      Motor: No abnormal muscle tone.      Deep Tendon Reflexes: Reflexes normal.   Psychiatric:         Behavior: Behavior normal.        I have reexamined the patient and the results are consistent with the previously documented exam. Ansley Flores, APRN     Result Review :   Results from last 7 days   Lab Units 08/18/21  1213   WBC 10*3/mm3 2.12*   NEUTROS ABS 10*3/mm3 1.17*   HEMOGLOBIN g/dL 7.4*   HEMATOCRIT % 23.3*   PLATELETS 10*3/mm3 174     Results from last 7 days   Lab Units 08/18/21  1213   SODIUM mmol/L 134   POTASSIUM mmol/L 4.0   CHLORIDE mmol/L 97*   CO2 mmol/L 25.4   BUN mg/dL 23*   CREATININE mg/dL 3.52*   CALCIUM mg/dL 8.5   ALBUMIN g/dL 3.10*   BILIRUBIN mg/dL 0.6   ALK PHOS U/L 84   ALT (SGPT) U/L 10   AST (SGOT) U/L 17   GLUCOSE mg/dL 158*              Assessment and Plan    1. Metastatic lobular breast cancer (ER/UT positive, HER-2/tj negative):  · History of stage IIIC right breast cancer ([id number])  · Patient treated previously in the [place]  · Diagnosis via excisional biopsy  8/23/2003 with lobular carcinoma, 4.5 cm, positive margins.  ER/NV positive, HER-2/tj negative.  · Staging evaluation in September 2003 with negative bone scan and negative CT scans.  Ejection fraction 65%.  · Received neoadjuvant chemotherapy (? GET regimen) which apparently included Taxotere and possibly epirubicin.  Received 6 cycles.  · 1/22/2004 bilateral mastectomy with right axillary dissection.  Per available records, 10-12 cm residual invasive lobular carcinoma in the breast with 14/16 lymph nodes positive with extranodal extension.  Immediate reconstruction.  · Received adjuvant chemotherapy with carboplatin and navelbine x4 cycles  · Initiated adjuvant tamoxifen 6/22/2004  · Adjuvant radiation therapy initiated but interrupted due to chest wall cellulitis requiring removal of implants in August 2004  · Implant reconstruction again attempted with MRSA infection, implant removed 12/30/2005 with no further attempts made and reconstruction.  · Completed 5 years tamoxifen in June 2009 and subsequently transitioned to Femara.  Received Femara until June 2014.  · Patient experienced postmenopausal vaginal bleeding in 2013, negative endometrial biopsy and gynecologic evaluation.  · Patient last seen in [facility] 6/18/2014  · CT chest performed to evaluate bicuspid aortic valve and dilated asending aorta 7/12/2019.  CT showed no change in aorta however showed new free intraperitoneal fluid in the upper abdomen with mesenteric edema, concerning for carcinomatosis.  · CT abdomen and pelvis (without IV contrast) on 7/16/2019 with mesenteric thickening, small volume of complex fluid in the mesentery which was suspicious and possibly representative of carcinomatosis, small amount of perihepatic fluid, small bowel loops tethered to omentum without obstruction, omental thickening, shotty retroperitoneal and pelvic lymph nodes (non-pathologically enlarged).  · PET scan 7/26/2019 with hypermetabolic omental activity,  hypermetabolic small retroperitoneal lymph nodes, hypermetabolic activity throughout uterus with increase in size.  · CT-guided omental biopsy 7/30/2019 with metastatic lobular carcinoma of breast primary, ER positive (greater than 95%), MT positive (90%), HER-2/tj negative (1+ IHC)  · Baseline CA 15-3 on 7/22/19 was 32.6  · Initiation of Aromasin 25 mg daily 8/12/2019.  Initiated Ibrance at 100 mg 21/28 days on 8/26/2019.  · Improvement in CA 15-3 on 9/17/19-26.3  · Following 2 cycles of Aromasin and Ibrance, CA 15-3 declined to 25.9 on 10/15/2019.  CT scan chest abdomen pelvis 10/16/2019 showed improvement in the mesenteric and omental thickening and near resolution of complex ascites.  Treatment continued.  · Stable findings on subsequent CT chest abdomen pelvis 12/11/2019.  Further improvement in CA 15-3-23.9 on 12/18/2019.  Ibrance/Aromasin continued.   · [facility] medicine liquid biopsy 2/5/2020: MSI undetermined, mutations in BETH, NF1, CHEK2.  No evidence of PIK3CA mutation.  · Slight increase in CA 15-3 to 27.1 on 2/19/2020 and 29.1 on 3/18/2020.  CT however on 3/13/2020 with no new findings.  · Subsequent improvement in CA 15-3-26.5 on 5/15/2020  · CT 7/31/2020 showed evidence of stable disease.  CA 15-3 declined further to 23.1 on 7/24/2020.  · CT 10/19/2020 with stable disease.  Fluctuations in CA 15-3 of unclear significance.  · CA 15-3 on 1/6/2021 decreased to 23.  CT chest abdomen pelvis 1/29/2021 with stable findings.  · CA 15-3 on 4/1/2021 was 28.4  · CT 4/22/2021 with no evidence of progressive malignancy, notation of new focal linear subpleural opacification right upper lobe felt to be infectious and/or inflammatory.  Enlargement of 2 staghorn calculi left kidney.  Persistent left perinephric stranding.    · Hospitalization 4/29-5/4/2021 with RYAN/CKD, UTI, anemia.  Required 2 unit PRBC transfusion and initiated hemodialysis Tuesday Thursday Saturday.  Brief interruption in Ibrance during  hospitalization.  · CA 15-3 on 5/27/2021 was 27.8.  · CT chest abdomen pelvis 7/2/2021 showed no evidence of progression or new metastatic disease.  There was only one remaining left renal stone which was smaller than previous exam.  Stranding surrounding both kidneys left greater than right with left hydronephrosis and hydroureter.  · CA 15-3 7/7/2021 was 25.3  · Patient returns today in follow-up continuing on Aromasin 25 mg daily, Ibrance 100 mg daily for 21/28 days (due to begin next cycle Ibrance on 8/23/2021).  The patient is tolerating Ibrance and Aromasin without any significant side effects.  She has had worsening anemia as of late, felt to be exacerbated by Ibrance.  She did require blood transfusion 7/22/2021 for hemoglobin of 6.5.  We are monitoring her counts every 2 weeks due to worsening anemia.  We do plan to recheck CA 15-3 in 2 more weeks for ongoing monitoring.  We are tentatively planning repeat imaging at 3 month interval.  2. Anemia secondary to ESRD and possibly exacerbated by Ibrance:  · The patient appears to have a chronic anemia with hemoglobin in the 10-11 range with normal MCV.  · Patient has underlying CKD3 with baseline creatinine recently in the 1.5-1.8 range.  · Anemia evaluation 7/22/2019 with iron 30, ferritin 85.2, iron saturation 10%, TIBC 311, B12 370, erythropoietin level 20.4  · Anemia felt in large part to be related to CKD3 as well as to underlying malignancy  · Evidence of folate deficiency 8/12/2019 with level 3.84, initiated folic acid 1 mg daily  · Additional labs on 12/18/2019 with iron 73, ferritin 82.2, iron saturation 25%, TIBC 290.  We did discuss the potential use of Procrit if her hemoglobin declines into the low 9/upper 8 range.  · Decline in hemoglobin into the 8 range, iron studies 7/20/2020 with iron 54, ferritin 66.7, iron saturation 16%, TIBC 328.  On 8/7/2020 proceeded with Injectafer 750 mg IV x1 dose.  Second dose not administered due to onset of fever,  subsequent iron studies precluded further administration of IV iron.  · Initiated Procrit 20,000 units every 4 weeks on 9/4/2020.  · On 1/6/2021, hemoglobin declined significantly to 7.1.  This was repeated and verified at 7.2.  No evidence of GI blood loss, stool cards negative for occult blood x3 on 1/12/2021.  Additional labs on 1/6/2021 with iron 47, ferritin 430, iron saturation 20%, TIBC 235, folate greater than 20, B12 324, haptoglobin 525, .  · Transfused 2 unit PRBC on 1/7/2021  · Change in Procrit dosing to weekly  · Due to low normal B12 level initiated oral B12 1000 mcg daily.  · Patient with symptomatic decline in hemoglobin to 7.7 on 4/1/2021, received 1 unit PRBC transfusion  · Hospitalization 4/29-5/4/2021 with RYAN/CKD, UTI, anemia.  Required 2 unit PRBC transfusion and initiated hemodialysis Tuesday Thursday Saturday.  · Transfused 2 units PRBC for hemoglobin 7.2 on 5/27/2021  · Following initiation of hemodialysis, management of BEAU transitioned to nephrology, receiving Epogen with dialysis.  · 7/21/2021 hemoglobin significantly lower at 6.5.  It is felt that Ibrance is likely exacerbating her anemia.  No clinical evidence of GI blood loss and previous stool for occult blood was negative x3 (1/12/2021).  We did check additional anemia labs with iron 17, ferritin 646, iron saturation 9%, TIBC 193, folate greater than 20, B12 690.  Therefore no indications for deficiencies to explain her anemia.  Dr. Irvin did contact patient's nephrologist, Dr. Antonio.  He was going to ensure that the patient was receiving maximum dose of Epogen with dialysis.  Patient did receive transfusion 2 units PRBCs on 7/22/2021.   · Today patient's hemoglobin is back down to 7.4.  She is feeling more fatigued.  We will once again pursue transfusion though this will be able to take place until this Saturday, 8/21/2021.  I will make Dr. Antonio once again aware and also discussed with Dr. Irvin any potential changes to  current Ibrance dosing.  3. ESRD on HD:  · Baseline creatinine recently in 1.6-2.0 range  · On 9/10/2019, RYAN/CKD3 with creatinine up to 2.44, BUN of 40.  Received 1 L normal saline.  Patient with increase in oral fluid intake.  · Renal ultrasound 9/20/2019 with no evidence of obstructive uropathy.  · Diminished oral intake due to nausea from Ibrance, creatinine 2.79 with BUN 43 on 10/15/2019.  Received 1 L normal saline.  Repeat labs on 10/18/2019 with BUN 31, creatinine 2.0.  · During cycle 3 Ibrance, patient developed increase in creatinine on 11/6/2019 to 2.35 and received 1 L normal saline.  · Patient is trying to maintain adequate oral hydration.    · Gradual escalation of creatinine into the 2-2.4 range and subsequently into the 2.5-2.8 range  · Creatinine increased into the low 3 range in early January 2021.  Patient increased oral hydration with improvement back into the mid 2 range.  · Hospitalization 4/29-5/4/2021 with RYAN/CKD, UTI, anemia.  Required 2 unit PRBC transfusion and initiated hemodialysis Tuesday Thursday Saturday.   · Patient is currently continuing on hemodialysis Tuesday Thursday Saturday.  4. CHF with mild to moderate aortic stenosis:  · Recent cardiology evaluation with echocardiogram 7/12/2019 showing ejection fraction 48% with moderate dilation of the LV cavity, global hypokinesis, grade 2 diastolic dysfunction, mild to moderate aortic stenosis, trivial pericardial effusion.  · CT chest 7/12/2019 with no change in the aorta compared to prior study 12/13/2017.  · Echocardiogram 7/19/2021 with ejection fraction 56%, left atrial volume moderately increased, grade 2 diastolic dysfunction, severe calcification aortic valve, moderate aortic stenosis, severe mitral calcification, mild mitral stenosis, marked elevation in RVSP from tricuspid regurgitation.  · Patient notes that she discussed echo results with cardiology and valvular status was felt to be stable in regards to aortic  stenosis.  5. Left upper and bilateral lower extremity peripheral neuropathy:  · Patient reports that left upper extremity neuropathy was not present from previous chemotherapy but occurred after hospitalization that required intubation and critical care stay.  Apparently felt to represent critical care neuropathy.  Improved over time.  · Bilateral lower extremity neuropathy felt to be related to diabetes  · May have future implications regarding treatment for breast cancer.  6. Uterine/endometrial activity on PET scan:  · Patient experienced postmenopausal vaginal bleeding in 2013, negative endometrial biopsy and gynecologic evaluation.  · With findings on PET scan with enlarging uterus and some hypermetabolic activity, referred back to Dr. Oliveros and gynecology.    · 9/19/2019 D&C with hysteroscopy by Dr. Oliveros, no significant intraoperative findings, pathologic results with benign findings, no evidence of malignancy.  7. Nausea:  · Mild, relieved with Zofran.   · Patient reports improvement in nausea since initiating hemodialysis  8. Myelosuppression secondary to Ibrance:  · With cycle 1, jessica WBC 2.49 with ANC 1.25 and platelet count 140,000.  · With cycle 2, jessica WBC 2.33 with ANC 1.06, maintained normal platelet count  · Today, WBC 2.1 with ANC 1.1 (currently in off week of Ibrance)  9. Depression  · When seen on 3/4/2021, patient indicated increasing symptoms of depression.  She did not wish to discuss this further at the time.  She indicated that she was coping adequately.  She was offered referral to the supportive oncology clinic however declined this.  10. Recurrent UTI with enlarging staghorn calculi in the left kidney  · CT 4/22/2021 showed interval enlargement of 2 staghorn calculi in the left kidney with persistent left perinephric stranding  · Hospitalization 4/29-5/4/2021 with RYAN/CKD, UTI, anemia.  Required 2 unit PRBC transfusion and initiated hemodialysis Tuesday Thursday Saturday.     · Discussion from urology regarding potential need for surgical procedure to address staghorn calculus left kidney  · CT scan 7/2/2021 with only 1 remaining left renal stone identified which had decreased in size.  Patient appears to have passed the other stone.  · Patient reports that urology does not plan a procedure at this point however may wish to pursue follow-up CT in the future.  We will try to coordinate scans with CT needed for monitoring of her breast cancer status.    Plan:  1. Proceed with 2 unit PRBC transfusion to take place this Saturday, 8/21/2021, following dialysis.  2. I will make Dr. Antonio aware of repeat transfusion plans.  3. I will discuss patient's case with Dr. Irvin once available, specifically any need for alteration of Ibrance dosing considering myelosuppression per above.  4. Continue Aromasin 25 mg daily   5. The patient continues on hemodialysis Tuesday Thursday Saturday  6. Nephrology is currently managing the patient's anemia, patient receiving Epogen with dialysis. We previously discussed case with Dr. Antonio to ensure patient is receiving maximum dose Epogen  7. Continue oral folic acid 1 mg daily, B12 1000 mcg daily.  8. Patient will be due to begin next cycle of Ibrance on 8/23/2021 - hold until I have discussed with Dr. Irvin.  9. In 2 weeks CBC, CMP, CA 15-3 and RN review  10. In 4 weeks MD visit with CBC, CMP.  Patient will be due to begin next cycle Ibrance on 9/20/2021.     Patient continues on high risk medication requiring intensive monitoring.    ADDENDUM: Per discussion with Dr. Irvin, bring patient back on Monday, 8/23/2021 for repeat CBC and NP review before patient restarts Ibrance.  Patient notified of plan.  Depending on her counts, may need to discuss Ibrance dose adjustment/delay with Dr. Irvin.

## 2021-08-18 NOTE — TELEPHONE ENCOUNTER
----- Message from BETY Kerr sent at 8/18/2021  5:02 PM EDT -----  Regarding: appt  Per Dr. Irvin, please make this patient an appointment for CBC and NP follow-up on Monday, 8/23/2021.  Preferably needs to be in the morning.  Thank you.

## 2021-08-21 ENCOUNTER — INFUSION (OUTPATIENT)
Dept: ONCOLOGY | Facility: HOSPITAL | Age: 61
End: 2021-08-21

## 2021-08-21 VITALS
OXYGEN SATURATION: 98 % | HEART RATE: 57 BPM | DIASTOLIC BLOOD PRESSURE: 59 MMHG | RESPIRATION RATE: 18 BRPM | SYSTOLIC BLOOD PRESSURE: 131 MMHG | TEMPERATURE: 98.4 F

## 2021-08-21 DIAGNOSIS — D63.1 ANEMIA DUE TO CHRONIC KIDNEY DISEASE, ON CHRONIC DIALYSIS (HCC): ICD-10-CM

## 2021-08-21 DIAGNOSIS — N18.6 ANEMIA DUE TO CHRONIC KIDNEY DISEASE, ON CHRONIC DIALYSIS (HCC): ICD-10-CM

## 2021-08-21 DIAGNOSIS — Z99.2 ANEMIA DUE TO CHRONIC KIDNEY DISEASE, ON CHRONIC DIALYSIS (HCC): ICD-10-CM

## 2021-08-21 PROCEDURE — 86900 BLOOD TYPING SEROLOGIC ABO: CPT

## 2021-08-21 PROCEDURE — P9016 RBC LEUKOCYTES REDUCED: HCPCS

## 2021-08-21 PROCEDURE — 36430 TRANSFUSION BLD/BLD COMPNT: CPT

## 2021-08-21 PROCEDURE — 63710000001 DIPHENHYDRAMINE PER 50 MG: Performed by: NURSE PRACTITIONER

## 2021-08-21 RX ORDER — ACETAMINOPHEN 325 MG/1
325 TABLET ORAL ONCE
Status: COMPLETED | OUTPATIENT
Start: 2021-08-21 | End: 2021-08-21

## 2021-08-21 RX ORDER — DIPHENHYDRAMINE HCL 25 MG
25 CAPSULE ORAL ONCE
Status: COMPLETED | OUTPATIENT
Start: 2021-08-21 | End: 2021-08-21

## 2021-08-21 RX ADMIN — ACETAMINOPHEN 325 MG: 325 TABLET, FILM COATED ORAL at 11:36

## 2021-08-21 RX ADMIN — DIPHENHYDRAMINE HYDROCHLORIDE 25 MG: 25 CAPSULE ORAL at 11:37

## 2021-08-23 ENCOUNTER — OFFICE VISIT (OUTPATIENT)
Dept: ONCOLOGY | Facility: CLINIC | Age: 61
End: 2021-08-23

## 2021-08-23 ENCOUNTER — LAB (OUTPATIENT)
Dept: LAB | Facility: HOSPITAL | Age: 61
End: 2021-08-23

## 2021-08-23 VITALS
DIASTOLIC BLOOD PRESSURE: 71 MMHG | TEMPERATURE: 97.3 F | OXYGEN SATURATION: 97 % | RESPIRATION RATE: 16 BRPM | HEIGHT: 67 IN | SYSTOLIC BLOOD PRESSURE: 138 MMHG | HEART RATE: 59 BPM | BODY MASS INDEX: 60.13 KG/M2

## 2021-08-23 DIAGNOSIS — C50.919 METASTATIC BREAST CANCER: ICD-10-CM

## 2021-08-23 DIAGNOSIS — Z99.2 ANEMIA DUE TO CHRONIC KIDNEY DISEASE, ON CHRONIC DIALYSIS (HCC): Primary | ICD-10-CM

## 2021-08-23 DIAGNOSIS — D63.1 ANEMIA DUE TO CHRONIC KIDNEY DISEASE, ON CHRONIC DIALYSIS (HCC): ICD-10-CM

## 2021-08-23 DIAGNOSIS — Z79.899 HIGH RISK MEDICATION USE: ICD-10-CM

## 2021-08-23 DIAGNOSIS — N18.6 ANEMIA DUE TO CHRONIC KIDNEY DISEASE, ON CHRONIC DIALYSIS (HCC): Primary | ICD-10-CM

## 2021-08-23 DIAGNOSIS — Z99.2 ANEMIA DUE TO CHRONIC KIDNEY DISEASE, ON CHRONIC DIALYSIS (HCC): ICD-10-CM

## 2021-08-23 DIAGNOSIS — N18.6 ANEMIA DUE TO CHRONIC KIDNEY DISEASE, ON CHRONIC DIALYSIS (HCC): ICD-10-CM

## 2021-08-23 DIAGNOSIS — D63.1 ANEMIA DUE TO CHRONIC KIDNEY DISEASE, ON CHRONIC DIALYSIS (HCC): Primary | ICD-10-CM

## 2021-08-23 LAB
BASOPHILS # BLD AUTO: 0.06 10*3/MM3 (ref 0–0.2)
BASOPHILS NFR BLD AUTO: 1.8 % (ref 0–1.5)
DEPRECATED RDW RBC AUTO: 75.7 FL (ref 37–54)
EOSINOPHIL # BLD AUTO: 0.05 10*3/MM3 (ref 0–0.4)
EOSINOPHIL NFR BLD AUTO: 1.5 % (ref 0.3–6.2)
ERYTHROCYTE [DISTWIDTH] IN BLOOD BY AUTOMATED COUNT: 19.9 % (ref 12.3–15.4)
HCT VFR BLD AUTO: 28.3 % (ref 34–46.6)
HGB BLD-MCNC: 9.2 G/DL (ref 12–15.9)
IMM GRANULOCYTES # BLD AUTO: 0.06 10*3/MM3 (ref 0–0.05)
IMM GRANULOCYTES NFR BLD AUTO: 1.8 % (ref 0–0.5)
LYMPHOCYTES # BLD AUTO: 0.74 10*3/MM3 (ref 0.7–3.1)
LYMPHOCYTES NFR BLD AUTO: 22.6 % (ref 19.6–45.3)
MCH RBC QN AUTO: 33.5 PG (ref 26.6–33)
MCHC RBC AUTO-ENTMCNC: 32.5 G/DL (ref 31.5–35.7)
MCV RBC AUTO: 102.9 FL (ref 79–97)
MONOCYTES # BLD AUTO: 0.46 10*3/MM3 (ref 0.1–0.9)
MONOCYTES NFR BLD AUTO: 14 % (ref 5–12)
NEUTROPHILS NFR BLD AUTO: 1.91 10*3/MM3 (ref 1.7–7)
NEUTROPHILS NFR BLD AUTO: 58.3 % (ref 42.7–76)
NRBC BLD AUTO-RTO: 0 /100 WBC (ref 0–0.2)
PLATELET # BLD AUTO: 179 10*3/MM3 (ref 140–450)
PMV BLD AUTO: 9.4 FL (ref 6–12)
RBC # BLD AUTO: 2.75 10*6/MM3 (ref 3.77–5.28)
WBC # BLD AUTO: 3.28 10*3/MM3 (ref 3.4–10.8)

## 2021-08-23 PROCEDURE — 85025 COMPLETE CBC W/AUTO DIFF WBC: CPT

## 2021-08-23 PROCEDURE — 99214 OFFICE O/P EST MOD 30 MIN: CPT | Performed by: NURSE PRACTITIONER

## 2021-08-23 RX ORDER — PREDNISOLONE ACETATE 10 MG/ML
SUSPENSION/ DROPS OPHTHALMIC 2 TIMES DAILY
COMMUNITY
Start: 2021-08-18 | End: 2021-11-02

## 2021-08-23 NOTE — PROGRESS NOTES
"Chief Complaint  Metastatic lobular breast cancer (ER/MA positive, HER-2/tj negative), anemia secondary to CKD3, CHF, peripheral neuropathy, chemotherapy related nausea chemotherapy-induced myelosuppression    Subjective        History of Present Illness  The patient returns today in follow-up with laboratory studies to review, continuing on Aromasin 25 mg daily and Ibrance 100 mg daily for 21/28 days.  She is due to restart her Ibrance today.    When patient was seen last month she was more short of breath and hemoglobin notably much lower at 6.5.  Dr. Irvin spoke with patient's nephrologist, Dr. Tai Ledesma who stated he would check to make sure the patient was receiving maximum dose of Epogen with dialysis.  Patient did receive transfusion on 7/22/2021 following dialysis.  Repeat hemoglobin 2 weeks later increased to 8.2.    When seen on 8/18/21 hemoglobin dropped to 7.4.  She was transfused on 8/21/21.    Patient also continues on dialysis on Tuesdays, Thursdays, and Saturdays.  She does experience worsening fatigue after dialysis.    She denies any new concerns today.    Objective   Vital Signs:   /71   Pulse 59   Temp 97.3 °F (36.3 °C) (Temporal)   Resp 16   Ht 170.2 cm (67.01\")   SpO2 97%   BMI 60.13 kg/m²     Physical Exam  Constitutional:       Appearance: She is well-developed. She is obese.      Comments: Patient is in a motorized scooter today   Eyes:      Conjunctiva/sclera: Conjunctivae normal.   Neck:      Thyroid: No thyromegaly.   Cardiovascular:      Rate and Rhythm: Normal rate and regular rhythm.      Heart sounds: Murmur heard.   No friction rub. No gallop.       Comments: 2/6 murmur  Pulmonary:      Effort: No respiratory distress.      Breath sounds: Normal breath sounds.      Comments: Dialysis access in place in the right subclavian position  Abdominal:      General: Bowel sounds are normal. There is no distension.      Palpations: Abdomen is soft.      Tenderness: There is no " abdominal tenderness.   Musculoskeletal:         General: Swelling present.      Comments: Trace bilateral lower extremity edema   Lymphadenopathy:      Head:      Right side of head: No submandibular adenopathy.      Cervical: No cervical adenopathy.      Upper Body:      Right upper body: No supraclavicular adenopathy.      Left upper body: No supraclavicular adenopathy.   Skin:     General: Skin is warm and dry.      Findings: No bruising or rash.   Neurological:      Mental Status: She is alert and oriented to person, place, and time.   Psychiatric:         Behavior: Behavior normal.        I have reexamined the patient and the results are consistent with the previously documented exam. Deepika Monzon, APRN     Result Review :   Results from last 7 days   Lab Units 08/23/21  0833 08/18/21  1213   WBC 10*3/mm3 3.28* 2.12*   NEUTROS ABS 10*3/mm3 1.91 1.17*   HEMOGLOBIN g/dL 9.2* 7.4*   HEMATOCRIT % 28.3* 23.3*   PLATELETS 10*3/mm3 179 174     Results from last 7 days   Lab Units 08/18/21  1213   SODIUM mmol/L 134   POTASSIUM mmol/L 4.0   CHLORIDE mmol/L 97*   CO2 mmol/L 25.4   BUN mg/dL 23*   CREATININE mg/dL 3.52*   CALCIUM mg/dL 8.5   ALBUMIN g/dL 3.10*   BILIRUBIN mg/dL 0.6   ALK PHOS U/L 84   ALT (SGPT) U/L 10   AST (SGOT) U/L 17   GLUCOSE mg/dL 158*              Assessment and Plan    1. Metastatic lobular breast cancer (ER/OH positive, HER-2/tj negative):  · History of stage IIIC right breast cancer ([id number])  · [facility]  · Diagnosis via excisional biopsy 8/23/2003 with lobular carcinoma, 4.5 cm, positive margins.  ER/OH positive, HER-2/tj negative.  · Staging evaluation in September 2003 with negative bone scan and negative CT scans.  Ejection fraction 65%.  · Received neoadjuvant chemotherapy (? GET regimen) which apparently included Taxotere and possibly epirubicin.  Received 6 cycles.  · 1/22/2004 bilateral mastectomy with right axillary dissection.  Per  available records, 10-12 cm residual invasive lobular carcinoma in the breast with 14/16 lymph nodes positive with extranodal extension.  Immediate reconstruction.  · Received adjuvant chemotherapy with carboplatin and navelbine x4 cycles  · Initiated adjuvant tamoxifen 6/22/2004  · Adjuvant radiation therapy initiated but interrupted due to chest wall cellulitis requiring removal of implants in August 2004  · Implant reconstruction again attempted with MRSA infection, implant removed 12/30/2005 with no further attempts made and reconstruction.  · Completed 5 years tamoxifen in June 2009 and subsequently transitioned to Femara.  Received Femara until June 2014.  · Patient experienced postmenopausal vaginal bleeding in 2013, negative endometrial biopsy and gynecologic evaluation.  · Patient last seen in [facility] 6/18/2014  · CT chest performed to evaluate bicuspid aortic valve and dilated asending aorta 7/12/2019.  CT showed no change in aorta however showed new free intraperitoneal fluid in the upper abdomen with mesenteric edema, concerning for carcinomatosis.  · CT abdomen and pelvis (without IV contrast) on 7/16/2019 with mesenteric thickening, small volume of complex fluid in the mesentery which was suspicious and possibly representative of carcinomatosis, small amount of perihepatic fluid, small bowel loops tethered to omentum without obstruction, omental thickening, shotty retroperitoneal and pelvic lymph nodes (non-pathologically enlarged).  · PET scan 7/26/2019 with hypermetabolic omental activity, hypermetabolic small retroperitoneal lymph nodes, hypermetabolic activity throughout uterus with increase in size.  · CT-guided omental biopsy 7/30/2019 with metastatic lobular carcinoma of breast primary, ER positive (greater than 95%), NH positive (90%), HER-2/tj negative (1+ IHC)  · Baseline CA 15-3 on 7/22/19 was 32.6  · Initiation of Aromasin 25 mg daily 8/12/2019.  Initiated Ibrance at 100 mg 21/28  days on 8/26/2019.  · Improvement in CA 15-3 on 9/17/19-26.3  · Following 2 cycles of Aromasin and Ibrance, CA 15-3 declined to 25.9 on 10/15/2019.  CT scan chest abdomen pelvis 10/16/2019 showed improvement in the mesenteric and omental thickening and near resolution of complex ascites.  Treatment continued.  · Stable findings on subsequent CT chest abdomen pelvis 12/11/2019.  Further improvement in CA 15-3-23.9 on 12/18/2019.  Ibrance/Aromasin continued.   · [facility] liquid biopsy 2/5/2020: MSI undetermined, mutations in BETH, NF1, CHEK2.  No evidence of PIK3CA mutation.  · Slight increase in CA 15-3 to 27.1 on 2/19/2020 and 29.1 on 3/18/2020.  CT however on 3/13/2020 with no new findings.  · Subsequent improvement in CA 15-3-26.5 on 5/15/2020  · CT 7/31/2020 showed evidence of stable disease.  CA 15-3 declined further to 23.1 on 7/24/2020.  · CT 10/19/2020 with stable disease.  Fluctuations in CA 15-3 of unclear significance.  · CA 15-3 on 1/6/2021 decreased to 23.  CT chest abdomen pelvis 1/29/2021 with stable findings.  · CA 15-3 on 4/1/2021 was 28.4  · CT 4/22/2021 with no evidence of progressive malignancy, notation of new focal linear subpleural opacification right upper lobe felt to be infectious and/or inflammatory.  Enlargement of 2 staghorn calculi left kidney.  Persistent left perinephric stranding.    · Hospitalization 4/29-5/4/2021 with RYAN/CKD, UTI, anemia.  Required 2 unit PRBC transfusion and initiated hemodialysis Tuesday Thursday Saturday.  Brief interruption in Ibrance during hospitalization.  · CA 15-3 on 5/27/2021 was 27.8.  · CT chest abdomen pelvis 7/2/2021 showed no evidence of progression or new metastatic disease.  There was only one remaining left renal stone which was smaller than previous exam.  Stranding surrounding both kidneys left greater than right with left hydronephrosis and hydroureter.  · CA 15-3 7/7/2021 was 25.3  · Patient returns today in follow-up continuing on  Aromasin 25 mg daily, Ibrance 100 mg daily for 21/28 days (due to begin next cycle Ibrance on 8/23/2021).  She has had worsening anemia as of late, felt to be exacerbated by Ibrance.  She did require blood transfusion 7/22/2021 for hemoglobin of 6.5.  Patient received another transfusion on 8/21/2021 secondary to a hemoglobin of 7.4.  · 8/23/21 patient returns for lab follow-up after needing transfusion over the weekend due to hemoglobin of 7.4.  She is due to restart her Ibrance today and labs are discussed with Dr. Irvin.  We will change the dosing of her Ibrance to 7 days on, 7 days off with maintaining the 100 mg dosing.  Patient returns on 9/1/2021 for labs with nurse review and on 9/14/2021 for review by Dr. Irvin.  She has hemoglobin checked at dialysis on the off weeks.  We will repeat check a CA 15-3 on 9/1/2021.  We anticipate imaging following 3 months of treatment.  2. Anemia secondary to ESRD and possibly exacerbated by Ibrance:  · The patient appears to have a chronic anemia with hemoglobin in the 10-11 range with normal MCV.  · Patient has underlying CKD3 with baseline creatinine recently in the 1.5-1.8 range.  · Anemia evaluation 7/22/2019 with iron 30, ferritin 85.2, iron saturation 10%, TIBC 311, B12 370, erythropoietin level 20.4  · Anemia felt in large part to be related to CKD3 as well as to underlying malignancy  · Evidence of folate deficiency 8/12/2019 with level 3.84, initiated folic acid 1 mg daily  · Additional labs on 12/18/2019 with iron 73, ferritin 82.2, iron saturation 25%, TIBC 290.  We did discuss the potential use of Procrit if her hemoglobin declines into the low 9/upper 8 range.  · Decline in hemoglobin into the 8 range, iron studies 7/20/2020 with iron 54, ferritin 66.7, iron saturation 16%, TIBC 328.  On 8/7/2020 proceeded with Injectafer 750 mg IV x1 dose.  Second dose not administered due to onset of fever, subsequent iron studies precluded further administration of IV  iron.  · Initiated Procrit 20,000 units every 4 weeks on 9/4/2020.  · On 1/6/2021, hemoglobin declined significantly to 7.1.  This was repeated and verified at 7.2.  No evidence of GI blood loss, stool cards negative for occult blood x3 on 1/12/2021.  Additional labs on 1/6/2021 with iron 47, ferritin 430, iron saturation 20%, TIBC 235, folate greater than 20, B12 324, haptoglobin 525, .  · Transfused 2 unit PRBC on 1/7/2021  · Change in Procrit dosing to weekly  · Due to low normal B12 level initiated oral B12 1000 mcg daily.  · Patient with symptomatic decline in hemoglobin to 7.7 on 4/1/2021, received 1 unit PRBC transfusion  · Hospitalization 4/29-5/4/2021 with RYAN/CKD, UTI, anemia.  Required 2 unit PRBC transfusion and initiated hemodialysis Tuesday Thursday Saturday.  · Transfused 2 units PRBC for hemoglobin 7.2 on 5/27/2021  · Following initiation of hemodialysis, management of BEAU transitioned to nephrology, receiving Epogen with dialysis.  · 7/21/2021 hemoglobin significantly lower at 6.5.  It is felt that Ibrance is likely exacerbating her anemia.  No clinical evidence of GI blood loss and previous stool for occult blood was negative x3 (1/12/2021).  We did check additional anemia labs with iron 17, ferritin 646, iron saturation 9%, TIBC 193, folate greater than 20, B12 690.  Therefore no indications for deficiencies to explain her anemia.  Dr. Irvin did contact patient's nephrologist, Dr. Antonio.  He was going to ensure that the patient was receiving maximum dose of Epogen with dialysis.  Patient did receive transfusion 2 units PRBCs on 7/22/2021.   · 8/18/21 patient's hemoglobin is back down to 7.4.  Proceeded with transfusion Saturday, 8/21/2021.    · Hemoglobin has improved to 9.2 today, that is post transfusion on Saturday.  3. ESRD on HD:  · Baseline creatinine recently in 1.6-2.0 range  · On 9/10/2019, RYAN/CKD3 with creatinine up to 2.44, BUN of 40.  Received 1 L normal saline.  Patient with  increase in oral fluid intake.  · Renal ultrasound 9/20/2019 with no evidence of obstructive uropathy.  · Diminished oral intake due to nausea from Ibrance, creatinine 2.79 with BUN 43 on 10/15/2019.  Received 1 L normal saline.  Repeat labs on 10/18/2019 with BUN 31, creatinine 2.0.  · During cycle 3 Ibrance, patient developed increase in creatinine on 11/6/2019 to 2.35 and received 1 L normal saline.  · Patient is trying to maintain adequate oral hydration.    · Gradual escalation of creatinine into the 2-2.4 range and subsequently into the 2.5-2.8 range  · Creatinine increased into the low 3 range in early January 2021.  Patient increased oral hydration with improvement back into the mid 2 range.  · Hospitalization 4/29-5/4/2021 with RYAN/CKD, UTI, anemia.  Required 2 unit PRBC transfusion and initiated hemodialysis Tuesday Thursday Saturday.   · Patient is currently continuing on hemodialysis Tuesday Thursday Saturday.  4. CHF with mild to moderate aortic stenosis:  · Recent cardiology evaluation with echocardiogram 7/12/2019 showing ejection fraction 48% with moderate dilation of the LV cavity, global hypokinesis, grade 2 diastolic dysfunction, mild to moderate aortic stenosis, trivial pericardial effusion.  · CT chest 7/12/2019 with no change in the aorta compared to prior study 12/13/2017.  · Echocardiogram 7/19/2021 with ejection fraction 56%, left atrial volume moderately increased, grade 2 diastolic dysfunction, severe calcification aortic valve, moderate aortic stenosis, severe mitral calcification, mild mitral stenosis, marked elevation in RVSP from tricuspid regurgitation.  · Patient notes that she discussed echo results with cardiology and valvular status was felt to be stable in regards to aortic stenosis.  5. Left upper and bilateral lower extremity peripheral neuropathy:  · Patient reports that left upper extremity neuropathy was not present from previous chemotherapy but occurred after  hospitalization that required intubation and critical care stay.  Apparently felt to represent critical care neuropathy.  Improved over time.  · Bilateral lower extremity neuropathy felt to be related to diabetes  · May have future implications regarding treatment for breast cancer.  6. Uterine/endometrial activity on PET scan:  · Patient experienced postmenopausal vaginal bleeding in 2013, negative endometrial biopsy and gynecologic evaluation.  · With findings on PET scan with enlarging uterus and some hypermetabolic activity, referred back to Dr. Oliveros and gynecology.    · 9/19/2019 D&C with hysteroscopy by Dr. Oliveros, no significant intraoperative findings, pathologic results with benign findings, no evidence of malignancy.  7. Nausea:  · Mild, relieved with Zofran.   · Patient reports improvement in nausea since initiating hemodialysis  8. Myelosuppression secondary to Ibrance:  · With cycle 1, jessica WBC 2.49 with ANC 1.25 and platelet count 140,000.  · With cycle 2, jessica WBC 2.33 with ANC 1.06, maintained normal platelet count  · Labs today with WBC 3.28, hemoglobin 9.2 (post transfusion over the weekend), platelet 179,000  9. Depression  · When seen on 3/4/2021, patient indicated increasing symptoms of depression.  She did not wish to discuss this further at the time.  She indicated that she was coping adequately.  She was offered referral to the supportive oncology clinic however declined this.  10. Recurrent UTI with enlarging staghorn calculi in the left kidney  · CT 4/22/2021 showed interval enlargement of 2 staghorn calculi in the left kidney with persistent left perinephric stranding  · Hospitalization 4/29-5/4/2021 with RYAN/CKD, UTI, anemia.  Required 2 unit PRBC transfusion and initiated hemodialysis Tuesday Thursday Saturday.    · Discussion from urology regarding potential need for surgical procedure to address staghorn calculus left kidney  · CT scan 7/2/2021 with only 1 remaining left renal  stone identified which had decreased in size.  Patient appears to have passed the other stone.  · Patient reports that urology does not plan a procedure at this point however may wish to pursue follow-up CT in the future.  We will try to coordinate scans with CT needed for monitoring of her breast cancer status.    Plan:  1. Resume Ibrance 100mg daily and a reduced schedule of 7 days on and 7 days off due to recent worsening anemia.  2. Continue Aromasin 25 mg daily   3. The patient continues on hemodialysis Tuesday Thursday Saturday  4. Nephrology is currently managing the patient's anemia, patient receiving Epogen with dialysis. We previously discussed case with Dr. Antonio to ensure patient is receiving maximum dose Epogen  5. Continue oral folic acid 1 mg daily, B12 1000 mcg daily.  6. 9/1/2021 CBC, CMP, CA 15-3 and nurse review.  7. 9/14/2021 labs and review by Dr. Irvin.     Patient continues on high risk medication requiring intensive monitoring.

## 2021-08-24 ENCOUNTER — TELEPHONE (OUTPATIENT)
Dept: PHARMACY | Facility: HOSPITAL | Age: 61
End: 2021-08-24

## 2021-08-24 NOTE — TELEPHONE ENCOUNTER
"Spoke with patient to ensure she has plenty of Ibrance on hand to start 100 mg po daily 7 on/7off. Pt states she \"has a ton\". She verbalizes correct schedule of 7 on/7 off. I advised her I would send new RX for MD amin to her pharmacy, just so the next time she gets it filled it has the correct directions, but that I would request for them to just put it on hold. Patient expressed understanding and had no additional questions at this time.    Rosanna Serna, PharmD  8/24/2021  13:29 EDT    "

## 2021-09-01 ENCOUNTER — LAB (OUTPATIENT)
Dept: LAB | Facility: HOSPITAL | Age: 61
End: 2021-09-01

## 2021-09-01 ENCOUNTER — CLINICAL SUPPORT (OUTPATIENT)
Dept: ONCOLOGY | Facility: HOSPITAL | Age: 61
End: 2021-09-01

## 2021-09-01 DIAGNOSIS — C50.919 METASTATIC BREAST CANCER: ICD-10-CM

## 2021-09-01 LAB
ALBUMIN SERPL-MCNC: 3.3 G/DL (ref 3.5–5.2)
ALBUMIN/GLOB SERPL: 0.9 G/DL (ref 1.1–2.4)
ALP SERPL-CCNC: 82 U/L (ref 38–116)
ALT SERPL W P-5'-P-CCNC: 7 U/L (ref 0–33)
ANION GAP SERPL CALCULATED.3IONS-SCNC: 9.6 MMOL/L (ref 5–15)
AST SERPL-CCNC: 11 U/L (ref 0–32)
BASOPHILS # BLD AUTO: 0.07 10*3/MM3 (ref 0–0.2)
BASOPHILS NFR BLD AUTO: 1.9 % (ref 0–1.5)
BILIRUB SERPL-MCNC: 0.7 MG/DL (ref 0.2–1.2)
BUN SERPL-MCNC: 19 MG/DL (ref 6–20)
BUN/CREAT SERPL: 6.4 (ref 7.3–30)
CALCIUM SPEC-SCNC: 8.5 MG/DL (ref 8.5–10.2)
CANCER AG15-3 SERPL-ACNC: 25.5 U/ML
CHLORIDE SERPL-SCNC: 101 MMOL/L (ref 98–107)
CO2 SERPL-SCNC: 26.4 MMOL/L (ref 22–29)
CREAT SERPL-MCNC: 2.97 MG/DL (ref 0.6–1.1)
DEPRECATED RDW RBC AUTO: 70.5 FL (ref 37–54)
EOSINOPHIL # BLD AUTO: 0.07 10*3/MM3 (ref 0–0.4)
EOSINOPHIL NFR BLD AUTO: 1.9 % (ref 0.3–6.2)
ERYTHROCYTE [DISTWIDTH] IN BLOOD BY AUTOMATED COUNT: 18.2 % (ref 12.3–15.4)
GFR SERPL CREATININE-BSD FRML MDRD: 16 ML/MIN/1.73
GLOBULIN UR ELPH-MCNC: 3.8 GM/DL (ref 1.8–3.5)
GLUCOSE SERPL-MCNC: 177 MG/DL (ref 74–124)
HCT VFR BLD AUTO: 30.1 % (ref 34–46.6)
HGB BLD-MCNC: 9.3 G/DL (ref 12–15.9)
IMM GRANULOCYTES # BLD AUTO: 0.04 10*3/MM3 (ref 0–0.05)
IMM GRANULOCYTES NFR BLD AUTO: 1.1 % (ref 0–0.5)
LYMPHOCYTES # BLD AUTO: 0.8 10*3/MM3 (ref 0.7–3.1)
LYMPHOCYTES NFR BLD AUTO: 21.8 % (ref 19.6–45.3)
MCH RBC QN AUTO: 33 PG (ref 26.6–33)
MCHC RBC AUTO-ENTMCNC: 30.9 G/DL (ref 31.5–35.7)
MCV RBC AUTO: 106.7 FL (ref 79–97)
MONOCYTES # BLD AUTO: 0.22 10*3/MM3 (ref 0.1–0.9)
MONOCYTES NFR BLD AUTO: 6 % (ref 5–12)
NEUTROPHILS NFR BLD AUTO: 2.47 10*3/MM3 (ref 1.7–7)
NEUTROPHILS NFR BLD AUTO: 67.3 % (ref 42.7–76)
NRBC BLD AUTO-RTO: 0 /100 WBC (ref 0–0.2)
PLATELET # BLD AUTO: 248 10*3/MM3 (ref 140–450)
PMV BLD AUTO: 8.6 FL (ref 6–12)
POTASSIUM SERPL-SCNC: 4.3 MMOL/L (ref 3.5–4.7)
PROT SERPL-MCNC: 7.1 G/DL (ref 6.3–8)
RBC # BLD AUTO: 2.82 10*6/MM3 (ref 3.77–5.28)
SODIUM SERPL-SCNC: 137 MMOL/L (ref 134–145)
WBC # BLD AUTO: 3.67 10*3/MM3 (ref 3.4–10.8)

## 2021-09-01 PROCEDURE — 85025 COMPLETE CBC W/AUTO DIFF WBC: CPT

## 2021-09-01 PROCEDURE — 36415 COLL VENOUS BLD VENIPUNCTURE: CPT

## 2021-09-01 PROCEDURE — G0463 HOSPITAL OUTPT CLINIC VISIT: HCPCS

## 2021-09-01 PROCEDURE — 80053 COMPREHEN METABOLIC PANEL: CPT

## 2021-09-01 PROCEDURE — 86300 IMMUNOASSAY TUMOR CA 15-3: CPT | Performed by: INTERNAL MEDICINE

## 2021-09-01 NOTE — NURSING NOTE
"Pt presents for RN review. CBC is stable for this patient at this time. She just started her week off from [facility] after changing her cycle schedule to 7 days on/7 days off and reports feeling \"better than she has in a long time\". Pt has no concerns at this time. Pt will watch [facility] for CMP results. Copy of labs given and pt knows to call the office w/ questions or concerns prior to her next visit. Pt v/u of all.    Lab Results   Component Value Date    WBC 3.67 09/01/2021    HGB 9.3 (L) 09/01/2021    HCT 30.1 (L) 09/01/2021    .7 (H) 09/01/2021     09/01/2021     "

## 2021-09-10 RX ORDER — EXEMESTANE 25 MG/1
TABLET ORAL
Qty: 90 TABLET | Refills: 1 | Status: SHIPPED | OUTPATIENT
Start: 2021-09-10 | End: 2021-11-12

## 2021-09-13 NOTE — PROGRESS NOTES
Chief Complaint  Metastatic lobular breast cancer (ER/[place] positive, HER-2/tj negative), anemia secondary to CKD3, CHF, peripheral neuropathy, chemotherapy related nausea chemotherapy-induced myelosuppression    Subjective        History of Present Illness  Patient returns today in follow-up continuing on Ibrance and Aromasin.  We did change her Ibrance dosing recently on 8/23/2021 from 100 mg daily for 21/28 days to 100 mg daily for 7 days on followed by 7 days off due to ongoing difficulty with severe anemia requiring intermittent transfusion support.  She reports that since she has been on the alter dose of Ibrance she feels better than she has in many months.  Her hemoglobin has been stable.  She does continue on dialysis every Tuesday Thursday Saturday and is receiving maximum dose Neupogen.  She also has been receiving intermittent IV iron.  She remains in a motorized scooter today as she does usually with limited mobility chronically related to her multiple medical issues and her obesity.  She has no new complaints today.  She is currently on her off week of Ibrance and is due to resume treatment on 9/20/2021.  She has no other side effects from treatment.  She is tolerating Aromasin well also at 25 mg daily.         Objective   Vital Signs:   /64   Pulse 73   Temp 98 °F (36.7 °C)   Resp 16   Wt (!) 161 kg (354 lb 15.1 oz)   SpO2 97%   BMI 55.58 kg/m²     Physical Exam  Constitutional:       Appearance: She is well-developed. She is obese.      Comments: Patient is in a motorized scooter today   Eyes:      Conjunctiva/sclera: Conjunctivae normal.   Neck:      Thyroid: No thyromegaly.   Cardiovascular:      Rate and Rhythm: Normal rate and regular rhythm.      Heart sounds: Murmur heard.   No friction rub. No gallop.       Comments: 2/6 murmur  Pulmonary:      Effort: No respiratory distress.      Breath sounds: Normal breath sounds.      Comments: Dialysis access in place in the right subclavian  position  Abdominal:      General: Bowel sounds are normal. There is no distension.      Palpations: Abdomen is soft.      Tenderness: There is no abdominal tenderness.   Musculoskeletal:         General: Swelling present.      Comments: Trace bilateral lower extremity edema, improved from prior exam   Lymphadenopathy:      Head:      Right side of head: No submandibular adenopathy.      Cervical: No cervical adenopathy.      Upper Body:      Right upper body: No supraclavicular adenopathy.      Left upper body: No supraclavicular adenopathy.   Skin:     General: Skin is warm and dry.      Findings: No bruising or rash.   Neurological:      Mental Status: She is alert and oriented to person, place, and time.      Cranial Nerves: No cranial nerve deficit.      Motor: No abnormal muscle tone.      Deep Tendon Reflexes: Reflexes normal.   Psychiatric:         Behavior: Behavior normal.        Patient was examined today, unchanged from above    Result Review : Reviewed CBC, CMP from today.  Reviewed CA 15-3 from 9/1/2021.       Assessment and Plan    1. Metastatic lobular breast cancer (ER/[place] positive, HER-2/tj negative):  · History of stage IIIC right breast cancer ([id number])  · Patient treated previously in the [facility]  · Diagnosis via excisional biopsy 8/23/2003 with lobular carcinoma, 4.5 cm, positive margins.  ER/[place] positive, HER-2/tj negative.  · Staging evaluation in September 2003 with negative bone scan and negative CT scans.  Ejection fraction 65%.  · Received neoadjuvant chemotherapy (? GET regimen) which apparently included Taxotere and possibly epirubicin.  Received 6 cycles.  · 1/22/2004 bilateral mastectomy with right axillary dissection.  Per available records, 10-12 cm residual invasive lobular carcinoma in the breast with 14/16 lymph nodes positive with extranodal extension.  Immediate reconstruction.  · Received adjuvant chemotherapy with carboplatin and navelbine x4 cycles  · Initiated  adjuvant tamoxifen 6/22/2004  · Adjuvant radiation therapy initiated but interrupted due to chest wall cellulitis requiring removal of implants in August 2004  · Implant reconstruction again attempted with MRSA infection, implant removed 12/30/2005 with no further attempts made and reconstruction.  · Completed 5 years tamoxifen in June 2009 and subsequently transitioned to Femara.  Received Femara until June 2014.  · Patient experienced postmenopausal vaginal bleeding in 2013, negative endometrial biopsy and gynecologic evaluation.  · Patient last seen in [facility] 6/18/2014  · CT chest performed to evaluate bicuspid aortic valve and dilated asending aorta 7/12/2019.  CT showed no change in aorta however showed new free intraperitoneal fluid in the upper abdomen with mesenteric edema, concerning for carcinomatosis.  · CT abdomen and pelvis (without IV contrast) on 7/16/2019 with mesenteric thickening, small volume of complex fluid in the mesentery which was suspicious and possibly representative of carcinomatosis, small amount of perihepatic fluid, small bowel loops tethered to omentum without obstruction, omental thickening, shotty retroperitoneal and pelvic lymph nodes (non-pathologically enlarged).  · PET scan 7/26/2019 with hypermetabolic omental activity, hypermetabolic small retroperitoneal lymph nodes, hypermetabolic activity throughout uterus with increase in size.  · CT-guided omental biopsy 7/30/2019 with metastatic lobular carcinoma of breast primary, ER positive (greater than 95%), AK positive (90%), HER-2/tj negative (1+ IHC)  · Baseline CA 15-3 on 7/22/19 was 32.6  · Initiation of Aromasin 25 mg daily 8/12/2019.  Initiated Ibrance at 100 mg 21/28 days on 8/26/2019.  · Improvement in CA 15-3 on 9/17/19-26.3  · Following 2 cycles of Aromasin and Ibrance, CA 15-3 declined to 25.9 on 10/15/2019.  CT scan chest abdomen pelvis 10/16/2019 showed improvement in the mesenteric and omental thickening and  near resolution of complex ascites.  Treatment continued.  · Stable findings on subsequent CT chest abdomen pelvis 12/11/2019.  Further improvement in CA 15-3-23.9 on 12/18/2019.  Ibrance/Aromasin continued.   · [facility] medicine liquid biopsy 2/5/2020: MSI undetermined, mutations in BETH, NF1, CHEK2.  No evidence of PIK3CA mutation.  · Slight increase in CA 15-3 to 27.1 on 2/19/2020 and 29.1 on 3/18/2020.  CT however on 3/13/2020 with no new findings.  · Subsequent improvement in CA 15-3-26.5 on 5/15/2020  · CT 7/31/2020 showed evidence of stable disease.  CA 15-3 declined further to 23.1 on 7/24/2020.  · CT 10/19/2020 with stable disease.  Fluctuations in CA 15-3 of unclear significance.  · CA 15-3 on 1/6/2021 decreased to 23.  CT chest abdomen pelvis 1/29/2021 with stable findings.  · CA 15-3 on 4/1/2021 was 28.4  · CT 4/22/2021 with no evidence of progressive malignancy, notation of new focal linear subpleural opacification right upper lobe felt to be infectious and/or inflammatory.  Enlargement of 2 staghorn calculi left kidney.  Persistent left perinephric stranding.    · Hospitalization 4/29-5/4/2021 with RYAN/CKD, UTI, anemia.  Required 2 unit PRBC transfusion and initiated hemodialysis Tuesday Thursday Saturday.  Brief interruption in Ibrance during hospitalization.  · CA 15-3 on 5/27/2021 was 27.8.  · CT chest abdomen pelvis 7/2/2021 showed no evidence of progression or new metastatic disease.  There was only one remaining left renal stone which was smaller than previous exam.  Stranding surrounding both kidneys left greater than right with left hydronephrosis and hydroureter.  · CA 15-3 7/7/2021 was 25.3  · Patient experienced severe ongoing anemia felt to be in part related to Ibrance requiring transfusion support intermittently.  Ibrance dose decreased on 8/23/2020 1 to 100 mg daily for 7 days on followed by 7 days off.  · Patient returns today in follow-up continuing on Ibrance at a reduced dose of 100  mg 7 days on followed by 7 days off in addition to Aromasin 25 mg daily.  The patient's Ibrance dosing was altered on 8/23/2021 to the current schedule due to her severe difficulties with anemia despite maximum Procrit and IV iron dosing on dialysis.  She was requiring ongoing intermittent transfusion support with hemoglobin down to 7.4 on 8/18/2021.  After alteration in her Ibrance dose, her hemoglobin today has remained stable at 9.3, white count is adequate at 2.82 with ANC 1.89.  Patient also reports that she feels much better in terms of chronic fatigue that she has in quite some time, presumably related to the change in her Ibrance dose as well.  We did review her CA 15-3 from 9/1/2021 remaining stable at 25.5.  We discussed timeframe for follow-up scans.  She will continue on her current dose and schedule of Ibrance in addition to Aromasin.  In 2 weeks she will have CBC, CMP and RN review.  In 4 weeks CBC, CMP, CA 15-3 and RN review and in 5 weeks undergo CT chest abdomen pelvis.  I will see her back in 6 weeks to review results.  Patient will be ready to resume a new week of Ibrance on 9/20/2021.  2. Anemia secondary to ESRD, exacerbated by Ibrance:  · The patient appears to have a chronic anemia with hemoglobin in the 10-11 range with normal MCV.  · Patient has underlying CKD3 with baseline creatinine recently in the 1.5-1.8 range.  · Anemia evaluation 7/22/2019 with iron 30, ferritin 85.2, iron saturation 10%, TIBC 311, B12 370, erythropoietin level 20.4  · Anemia felt in large part to be related to CKD3 as well as to underlying malignancy  · Evidence of folate deficiency 8/12/2019 with level 3.84, initiated folic acid 1 mg daily  · Additional labs on 12/18/2019 with iron 73, ferritin 82.2, iron saturation 25%, TIBC 290.  We did discuss the potential use of Procrit if her hemoglobin declines into the low 9/upper 8 range.  · Decline in hemoglobin into the 8 range, iron studies 7/20/2020 with iron 54,  ferritin 66.7, iron saturation 16%, TIBC 328.  On 8/7/2020 proceeded with Injectafer 750 mg IV x1 dose.  Second dose not administered due to onset of fever, subsequent iron studies precluded further administration of IV iron.  · Initiated Procrit 20,000 units every 4 weeks on 9/4/2020.  · On 1/6/2021, hemoglobin declined significantly to 7.1.  This was repeated and verified at 7.2.  No evidence of GI blood loss, stool cards negative for occult blood x3 on 1/12/2021.  Additional labs on 1/6/2021 with iron 47, ferritin 430, iron saturation 20%, TIBC 235, folate greater than 20, B12 324, haptoglobin 525, .  · Transfused 2 unit PRBC on 1/7/2021  · Change in Procrit dosing to weekly  · Due to low normal B12 level initiated oral B12 1000 mcg daily.  · Patient with symptomatic decline in hemoglobin to 7.7 on 4/1/2021, received 1 unit PRBC transfusion  · Hospitalization 4/29-5/4/2021 with RYNA/CKD, UTI, anemia.  Required 2 unit PRBC transfusion and initiated hemodialysis Tuesday Thursday Saturday.  · Transfused 2 units PRBC for hemoglobin 7.2 on 5/27/2021  · Following initiation of hemodialysis, management of BEAU transitioned to nephrology, receiving Epogen with dialysis.  · On 7/21/2021, decline in hemoglobin to 6.5 requiring additional transfusion support.  On 7/21/2021, additional anemia evaluation with iron 17, ferritin 646, iron saturation 9%, TIBC 193, folate greater than 20, B12 690.  Contacted Dr. Antonio in nephrology and confirmed that she was receiving maximum dose Epogen with dialysis.   · Patient received additional transfusion with hemoglobin down to 7.4 on 8/18/2021.  · Ongoing transfusion support prompted alteration in Ibrance dosing on 8/23/2021 as outlined above.  · After alteration in Ibrance dosing, hemoglobin has remained remarkably stable, currently 9.3.  3. ESRD on HD:  · Baseline creatinine recently in 1.6-2.0 range  · On 9/10/2019, RYAN/CKD3 with creatinine up to 2.44, BUN of 40.  Received 1 L  normal saline.  Patient with increase in oral fluid intake.  · Renal ultrasound 9/20/2019 with no evidence of obstructive uropathy.  · Diminished oral intake due to nausea from Ibrance, creatinine 2.79 with BUN 43 on 10/15/2019.  Received 1 L normal saline.  Repeat labs on 10/18/2019 with BUN 31, creatinine 2.0.  · During cycle 3 Ibrance, patient developed increase in creatinine on 11/6/2019 to 2.35 and received 1 L normal saline.  · Patient is trying to maintain adequate oral hydration.    · Gradual escalation of creatinine into the 2-2.4 range and subsequently into the 2.5-2.8 range  · Creatinine increased into the low 3 range in early January 2021.  Patient increased oral hydration with improvement back into the mid 2 range.  · Hospitalization 4/29-5/4/2021 with RAYN/CKD, UTI, anemia.  Required 2 unit PRBC transfusion and initiated hemodialysis Tuesday Thursday Saturday.   · Patient is currently continuing on hemodialysis Tuesday Thursday Saturday.  4. CHF with mild to moderate aortic stenosis:  · Recent cardiology evaluation with echocardiogram 7/12/2019 showing ejection fraction 48% with moderate dilation of the LV cavity, global hypokinesis, grade 2 diastolic dysfunction, mild to moderate aortic stenosis, trivial pericardial effusion.  · CT chest 7/12/2019 with no change in the aorta compared to prior study 12/13/2017.  · Echocardiogram 7/19/2021 with ejection fraction 56%, left atrial volume moderately increased, grade 2 diastolic dysfunction, severe calcification aortic valve, moderate aortic stenosis, severe mitral calcification, mild mitral stenosis, marked elevation in RVSP from tricuspid regurgitation.  · Patient notes that she discussed echo results with cardiology and valvular status was felt to be stable in regards to aortic stenosis.  5. Left upper and bilateral lower extremity peripheral neuropathy:  · Patient reports that left upper extremity neuropathy was not present from previous chemotherapy but  occurred after hospitalization that required intubation and critical care stay.  Apparently felt to represent critical care neuropathy.  Improved over time.  · Bilateral lower extremity neuropathy felt to be related to diabetes  · May have future implications regarding treatment for breast cancer.  6. Uterine/endometrial activity on PET scan:  · Patient experienced postmenopausal vaginal bleeding in 2013, negative endometrial biopsy and gynecologic evaluation.  · With findings on PET scan with enlarging uterus and some hypermetabolic activity, referred back to Dr. Oliveros and gynecology.    · 9/19/2019 D&C with hysteroscopy by Dr. Oliveros, no significant intraoperative findings, pathologic results with benign findings, no evidence of malignancy.  7. Nausea:  · Mild, relieved with Zofran.   · Patient reports improvement in nausea since initiating hemodialysis  8. Myelosuppression secondary to Ibrance:  · With cycle 1, jessica WBC 2.49 with ANC 1.25 and platelet count 140,000.  · With cycle 2, jessica WBC 2.33 with ANC 1.06, maintained normal platelet count  · Today, WBC 2.82 with ANC 1.89  9. Depression  · When seen on 3/4/2021, patient indicated increasing symptoms of depression.  She did not wish to discuss this further at the time.  She indicated that she was coping adequately.  She was offered referral to the supportive oncology clinic however declined this.  10. Recurrent UTI with enlarging staghorn calculi in the left kidney  · CT 4/22/2021 showed interval enlargement of 2 staghorn calculi in the left kidney with persistent left perinephric stranding  · Hospitalization 4/29-5/4/2021 with RYAN/CKD, UTI, anemia.  Required 2 unit PRBC transfusion and initiated hemodialysis Tuesday Thursday Saturday.    · Discussion from urology regarding potential need for surgical procedure to address staghorn calculus left kidney  · CT scan 7/2/2021 with only 1 remaining left renal stone identified which had decreased in size.  Patient  appears to have passed the other stone.  · Patient reports that urology does not plan a procedure at this point however may wish to pursue follow-up CT in the future.  We will try to coordinate scans with CT needed for monitoring of her breast cancer status.    Plan:  1. Continue Ibrance 100 mg daily for 7 days on followed by 7 days off (due to start next week of Ibrance on 9/20/2021).  2. Continue Aromasin 25 mg daily   3. The patient continues on hemodialysis Tuesday Thursday Saturday  4. Nephrology is currently managing the patient's anemia, patient receiving Epogen with dialysis as well as intermittent IV iron.  5. Continue oral folic acid 1 mg daily, B12 1000 mcg daily.  6. In 2 weeks CBC, CMP and RN review  7. In 4 weeks CBC, CMP, CA 15-3 and RN review  8. In 5 weeks CT chest abdomen and pelvis without IV contrast  9. In 6 weeks MD visit with CBC, CMP     Patient continues on high risk medication requiring intensive monitoring.

## 2021-09-14 ENCOUNTER — OFFICE VISIT (OUTPATIENT)
Dept: ONCOLOGY | Facility: CLINIC | Age: 61
End: 2021-09-14

## 2021-09-14 ENCOUNTER — LAB (OUTPATIENT)
Dept: LAB | Facility: HOSPITAL | Age: 61
End: 2021-09-14

## 2021-09-14 VITALS
WEIGHT: 293 LBS | HEART RATE: 73 BPM | TEMPERATURE: 98 F | SYSTOLIC BLOOD PRESSURE: 160 MMHG | BODY MASS INDEX: 55.58 KG/M2 | OXYGEN SATURATION: 97 % | RESPIRATION RATE: 16 BRPM | DIASTOLIC BLOOD PRESSURE: 64 MMHG

## 2021-09-14 DIAGNOSIS — C50.919 METASTATIC BREAST CANCER: ICD-10-CM

## 2021-09-14 DIAGNOSIS — C50.919 METASTATIC BREAST CANCER: Primary | ICD-10-CM

## 2021-09-14 LAB
ALBUMIN SERPL-MCNC: 3.3 G/DL (ref 3.5–5.2)
ALBUMIN/GLOB SERPL: 0.8 G/DL (ref 1.1–2.4)
ALP SERPL-CCNC: 95 U/L (ref 38–116)
ALT SERPL W P-5'-P-CCNC: 9 U/L (ref 0–33)
ANION GAP SERPL CALCULATED.3IONS-SCNC: 8.8 MMOL/L (ref 5–15)
AST SERPL-CCNC: 15 U/L (ref 0–32)
BASOPHILS # BLD AUTO: 0.04 10*3/MM3 (ref 0–0.2)
BASOPHILS NFR BLD AUTO: 1.4 % (ref 0–1.5)
BILIRUB SERPL-MCNC: 0.8 MG/DL (ref 0.2–1.2)
BUN SERPL-MCNC: 12 MG/DL (ref 6–20)
BUN/CREAT SERPL: 5.9 (ref 7.3–30)
CALCIUM SPEC-SCNC: 8.2 MG/DL (ref 8.5–10.2)
CHLORIDE SERPL-SCNC: 102 MMOL/L (ref 98–107)
CO2 SERPL-SCNC: 27.2 MMOL/L (ref 22–29)
CREAT SERPL-MCNC: 2.03 MG/DL (ref 0.6–1.1)
DEPRECATED RDW RBC AUTO: 66.3 FL (ref 37–54)
EOSINOPHIL # BLD AUTO: 0.08 10*3/MM3 (ref 0–0.4)
EOSINOPHIL NFR BLD AUTO: 2.8 % (ref 0.3–6.2)
ERYTHROCYTE [DISTWIDTH] IN BLOOD BY AUTOMATED COUNT: 16.9 % (ref 12.3–15.4)
GFR SERPL CREATININE-BSD FRML MDRD: 25 ML/MIN/1.73
GLOBULIN UR ELPH-MCNC: 4 GM/DL (ref 1.8–3.5)
GLUCOSE SERPL-MCNC: 277 MG/DL (ref 74–124)
HCT VFR BLD AUTO: 29.7 % (ref 34–46.6)
HGB BLD-MCNC: 9.3 G/DL (ref 12–15.9)
IMM GRANULOCYTES # BLD AUTO: 0.03 10*3/MM3 (ref 0–0.05)
IMM GRANULOCYTES NFR BLD AUTO: 1.1 % (ref 0–0.5)
LYMPHOCYTES # BLD AUTO: 0.55 10*3/MM3 (ref 0.7–3.1)
LYMPHOCYTES NFR BLD AUTO: 19.5 % (ref 19.6–45.3)
MCH RBC QN AUTO: 33.7 PG (ref 26.6–33)
MCHC RBC AUTO-ENTMCNC: 31.3 G/DL (ref 31.5–35.7)
MCV RBC AUTO: 107.6 FL (ref 79–97)
MONOCYTES # BLD AUTO: 0.23 10*3/MM3 (ref 0.1–0.9)
MONOCYTES NFR BLD AUTO: 8.2 % (ref 5–12)
NEUTROPHILS NFR BLD AUTO: 1.89 10*3/MM3 (ref 1.7–7)
NEUTROPHILS NFR BLD AUTO: 67 % (ref 42.7–76)
NRBC BLD AUTO-RTO: 0 /100 WBC (ref 0–0.2)
PLATELET # BLD AUTO: 244 10*3/MM3 (ref 140–450)
PMV BLD AUTO: 9 FL (ref 6–12)
POTASSIUM SERPL-SCNC: 3.7 MMOL/L (ref 3.5–4.7)
PROT SERPL-MCNC: 7.3 G/DL (ref 6.3–8)
RBC # BLD AUTO: 2.76 10*6/MM3 (ref 3.77–5.28)
SODIUM SERPL-SCNC: 138 MMOL/L (ref 134–145)
WBC # BLD AUTO: 2.82 10*3/MM3 (ref 3.4–10.8)

## 2021-09-14 PROCEDURE — 99214 OFFICE O/P EST MOD 30 MIN: CPT | Performed by: INTERNAL MEDICINE

## 2021-09-14 PROCEDURE — 85025 COMPLETE CBC W/AUTO DIFF WBC: CPT

## 2021-09-14 PROCEDURE — 80053 COMPREHEN METABOLIC PANEL: CPT

## 2021-09-14 PROCEDURE — 36415 COLL VENOUS BLD VENIPUNCTURE: CPT

## 2021-09-29 ENCOUNTER — LAB (OUTPATIENT)
Dept: LAB | Facility: HOSPITAL | Age: 61
End: 2021-09-29

## 2021-09-29 ENCOUNTER — CLINICAL SUPPORT (OUTPATIENT)
Dept: ONCOLOGY | Facility: HOSPITAL | Age: 61
End: 2021-09-29

## 2021-09-29 DIAGNOSIS — C50.919 METASTATIC BREAST CANCER: ICD-10-CM

## 2021-09-29 LAB
ALBUMIN SERPL-MCNC: 3.3 G/DL (ref 3.5–5.2)
ALBUMIN/GLOB SERPL: 0.8 G/DL (ref 1.1–2.4)
ALP SERPL-CCNC: 92 U/L (ref 38–116)
ALT SERPL W P-5'-P-CCNC: 7 U/L (ref 0–33)
ANION GAP SERPL CALCULATED.3IONS-SCNC: 11.8 MMOL/L (ref 5–15)
AST SERPL-CCNC: 12 U/L (ref 0–32)
BASOPHILS # BLD AUTO: 0.07 10*3/MM3 (ref 0–0.2)
BASOPHILS NFR BLD AUTO: 2.3 % (ref 0–1.5)
BILIRUB SERPL-MCNC: 0.5 MG/DL (ref 0.2–1.2)
BUN SERPL-MCNC: 19 MG/DL (ref 6–20)
BUN/CREAT SERPL: 5.7 (ref 7.3–30)
CALCIUM SPEC-SCNC: 8.6 MG/DL (ref 8.5–10.2)
CHLORIDE SERPL-SCNC: 100 MMOL/L (ref 98–107)
CO2 SERPL-SCNC: 25.2 MMOL/L (ref 22–29)
CREAT SERPL-MCNC: 3.31 MG/DL (ref 0.6–1.1)
DEPRECATED RDW RBC AUTO: 63.1 FL (ref 37–54)
EOSINOPHIL # BLD AUTO: 0.1 10*3/MM3 (ref 0–0.4)
EOSINOPHIL NFR BLD AUTO: 3.2 % (ref 0.3–6.2)
ERYTHROCYTE [DISTWIDTH] IN BLOOD BY AUTOMATED COUNT: 15.9 % (ref 12.3–15.4)
GFR SERPL CREATININE-BSD FRML MDRD: 14 ML/MIN/1.73
GFR SERPL CREATININE-BSD FRML MDRD: ABNORMAL ML/MIN/{1.73_M2}
GLOBULIN UR ELPH-MCNC: 4.4 GM/DL (ref 1.8–3.5)
GLUCOSE SERPL-MCNC: 242 MG/DL (ref 74–124)
HCT VFR BLD AUTO: 28.7 % (ref 34–46.6)
HGB BLD-MCNC: 9 G/DL (ref 12–15.9)
IMM GRANULOCYTES # BLD AUTO: 0.03 10*3/MM3 (ref 0–0.05)
IMM GRANULOCYTES NFR BLD AUTO: 1 % (ref 0–0.5)
LYMPHOCYTES # BLD AUTO: 0.71 10*3/MM3 (ref 0.7–3.1)
LYMPHOCYTES NFR BLD AUTO: 22.9 % (ref 19.6–45.3)
MCH RBC QN AUTO: 34 PG (ref 26.6–33)
MCHC RBC AUTO-ENTMCNC: 31.4 G/DL (ref 31.5–35.7)
MCV RBC AUTO: 108.3 FL (ref 79–97)
MONOCYTES # BLD AUTO: 0.19 10*3/MM3 (ref 0.1–0.9)
MONOCYTES NFR BLD AUTO: 6.1 % (ref 5–12)
NEUTROPHILS NFR BLD AUTO: 2 10*3/MM3 (ref 1.7–7)
NEUTROPHILS NFR BLD AUTO: 64.5 % (ref 42.7–76)
NRBC BLD AUTO-RTO: 0 /100 WBC (ref 0–0.2)
PLATELET # BLD AUTO: 273 10*3/MM3 (ref 140–450)
PMV BLD AUTO: 9.2 FL (ref 6–12)
POTASSIUM SERPL-SCNC: 4 MMOL/L (ref 3.5–4.7)
PROT SERPL-MCNC: 7.7 G/DL (ref 6.3–8)
RBC # BLD AUTO: 2.65 10*6/MM3 (ref 3.77–5.28)
SODIUM SERPL-SCNC: 137 MMOL/L (ref 134–145)
WBC # BLD AUTO: 3.1 10*3/MM3 (ref 3.4–10.8)

## 2021-09-29 PROCEDURE — 80053 COMPREHEN METABOLIC PANEL: CPT

## 2021-09-29 PROCEDURE — 85025 COMPLETE CBC W/AUTO DIFF WBC: CPT

## 2021-09-29 PROCEDURE — G0463 HOSPITAL OUTPT CLINIC VISIT: HCPCS

## 2021-09-29 PROCEDURE — 36415 COLL VENOUS BLD VENIPUNCTURE: CPT

## 2021-09-29 NOTE — NURSING NOTE
Creat 3.31    Called patient to notify here that creat was up from 2.03 on previous visit on 9/14/21. She states that she hadn't been drinking as much water. Encouraged increased fluid intake. Verbalized understanding. CBC and CMP results routed to Dr. Lujan Daily.

## 2021-09-29 NOTE — NURSING NOTE
Lab Results   Component Value Date    WBC 3.10 (L) 09/29/2021    HGB 9.0 (L) 09/29/2021    HCT 28.7 (L) 09/29/2021    .3 (H) 09/29/2021     09/29/2021     Pt is here for lab with RN review.  CBC reviewed with pt, counts are stable for this pt at this time. Pt has no complaints, and reports that she is feeling great. She states that the new dose of Ibrance has made a huge difference in how she feels. This her week off, and she will resume on 10/4/21. She continues to receive Epogen at dialysis. Her dialysis has been reduced to twice a week. Copy of CBC results given to pt and f/u appt reviewed. She will look at [facility] for CMP results. Pt is instructed to call the office with any concerns or new symptoms prior to next visit. Pete horner

## 2021-10-05 DIAGNOSIS — F32.A ANXIETY AND DEPRESSION: ICD-10-CM

## 2021-10-05 DIAGNOSIS — F41.9 ANXIETY AND DEPRESSION: ICD-10-CM

## 2021-10-13 ENCOUNTER — TELEPHONE (OUTPATIENT)
Dept: ONCOLOGY | Facility: CLINIC | Age: 61
End: 2021-10-13

## 2021-10-13 ENCOUNTER — CLINICAL SUPPORT (OUTPATIENT)
Dept: ONCOLOGY | Facility: HOSPITAL | Age: 61
End: 2021-10-13

## 2021-10-13 ENCOUNTER — LAB (OUTPATIENT)
Dept: LAB | Facility: HOSPITAL | Age: 61
End: 2021-10-13

## 2021-10-13 DIAGNOSIS — C50.919 METASTATIC BREAST CANCER: ICD-10-CM

## 2021-10-13 DIAGNOSIS — C50.919 METASTATIC BREAST CANCER: Primary | ICD-10-CM

## 2021-10-13 DIAGNOSIS — Z99.2 ANEMIA DUE TO CHRONIC KIDNEY DISEASE, ON CHRONIC DIALYSIS (HCC): ICD-10-CM

## 2021-10-13 DIAGNOSIS — N18.6 ANEMIA DUE TO CHRONIC KIDNEY DISEASE, ON CHRONIC DIALYSIS (HCC): ICD-10-CM

## 2021-10-13 DIAGNOSIS — D63.1 ANEMIA DUE TO CHRONIC KIDNEY DISEASE, ON CHRONIC DIALYSIS (HCC): ICD-10-CM

## 2021-10-13 LAB
ALBUMIN SERPL-MCNC: 3.5 G/DL (ref 3.5–5.2)
ALBUMIN/GLOB SERPL: 0.9 G/DL (ref 1.1–2.4)
ALP SERPL-CCNC: 90 U/L (ref 38–116)
ALT SERPL W P-5'-P-CCNC: 6 U/L (ref 0–33)
ANION GAP SERPL CALCULATED.3IONS-SCNC: 11.9 MMOL/L (ref 5–15)
AST SERPL-CCNC: 11 U/L (ref 0–32)
BASOPHILS # BLD AUTO: 0.07 10*3/MM3 (ref 0–0.2)
BASOPHILS NFR BLD AUTO: 1.7 % (ref 0–1.5)
BILIRUB SERPL-MCNC: 0.6 MG/DL (ref 0.2–1.2)
BUN SERPL-MCNC: 35 MG/DL (ref 6–20)
BUN/CREAT SERPL: 8.3 (ref 7.3–30)
CALCIUM SPEC-SCNC: 8.7 MG/DL (ref 8.5–10.2)
CANCER AG15-3 SERPL-ACNC: 25.6 U/ML
CHLORIDE SERPL-SCNC: 97 MMOL/L (ref 98–107)
CO2 SERPL-SCNC: 20.1 MMOL/L (ref 22–29)
CREAT SERPL-MCNC: 4.23 MG/DL (ref 0.6–1.1)
DEPRECATED RDW RBC AUTO: 61.1 FL (ref 37–54)
EOSINOPHIL # BLD AUTO: 0.06 10*3/MM3 (ref 0–0.4)
EOSINOPHIL NFR BLD AUTO: 1.4 % (ref 0.3–6.2)
ERYTHROCYTE [DISTWIDTH] IN BLOOD BY AUTOMATED COUNT: 15.9 % (ref 12.3–15.4)
GFR SERPL CREATININE-BSD FRML MDRD: 11 ML/MIN/1.73
GFR SERPL CREATININE-BSD FRML MDRD: ABNORMAL ML/MIN/{1.73_M2}
GLOBULIN UR ELPH-MCNC: 4.1 GM/DL (ref 1.8–3.5)
GLUCOSE SERPL-MCNC: 134 MG/DL (ref 74–124)
HCT VFR BLD AUTO: 22.5 % (ref 34–46.6)
HGB BLD-MCNC: 7.4 G/DL (ref 12–15.9)
IMM GRANULOCYTES # BLD AUTO: 0.03 10*3/MM3 (ref 0–0.05)
IMM GRANULOCYTES NFR BLD AUTO: 0.7 % (ref 0–0.5)
LYMPHOCYTES # BLD AUTO: 0.81 10*3/MM3 (ref 0.7–3.1)
LYMPHOCYTES NFR BLD AUTO: 19.5 % (ref 19.6–45.3)
MCH RBC QN AUTO: 34.9 PG (ref 26.6–33)
MCHC RBC AUTO-ENTMCNC: 32.9 G/DL (ref 31.5–35.7)
MCV RBC AUTO: 106.1 FL (ref 79–97)
MONOCYTES # BLD AUTO: 0.24 10*3/MM3 (ref 0.1–0.9)
MONOCYTES NFR BLD AUTO: 5.8 % (ref 5–12)
NEUTROPHILS NFR BLD AUTO: 2.95 10*3/MM3 (ref 1.7–7)
NEUTROPHILS NFR BLD AUTO: 70.9 % (ref 42.7–76)
NRBC BLD AUTO-RTO: 0 /100 WBC (ref 0–0.2)
PLATELET # BLD AUTO: 250 10*3/MM3 (ref 140–450)
PMV BLD AUTO: 8.6 FL (ref 6–12)
POTASSIUM SERPL-SCNC: 5 MMOL/L (ref 3.5–4.7)
PROT SERPL-MCNC: 7.6 G/DL (ref 6.3–8)
RBC # BLD AUTO: 2.12 10*6/MM3 (ref 3.77–5.28)
SODIUM SERPL-SCNC: 129 MMOL/L (ref 134–145)
WBC # BLD AUTO: 4.16 10*3/MM3 (ref 3.4–10.8)

## 2021-10-13 PROCEDURE — 80053 COMPREHEN METABOLIC PANEL: CPT

## 2021-10-13 PROCEDURE — 36415 COLL VENOUS BLD VENIPUNCTURE: CPT

## 2021-10-13 PROCEDURE — 85025 COMPLETE CBC W/AUTO DIFF WBC: CPT

## 2021-10-13 PROCEDURE — G0463 HOSPITAL OUTPT CLINIC VISIT: HCPCS

## 2021-10-13 PROCEDURE — 86300 IMMUNOASSAY TUMOR CA 15-3: CPT | Performed by: INTERNAL MEDICINE

## 2021-10-13 RX ORDER — ACETAMINOPHEN 325 MG/1
650 TABLET ORAL ONCE
Status: CANCELLED | OUTPATIENT
Start: 2021-10-17 | End: 2021-10-13

## 2021-10-13 RX ORDER — SODIUM CHLORIDE 9 MG/ML
250 INJECTION, SOLUTION INTRAVENOUS AS NEEDED
Status: CANCELLED | OUTPATIENT
Start: 2021-10-17

## 2021-10-13 RX ORDER — DIPHENHYDRAMINE HCL 25 MG
25 CAPSULE ORAL ONCE
Status: CANCELLED | OUTPATIENT
Start: 2021-10-17 | End: 2021-10-13

## 2021-10-13 NOTE — TELEPHONE ENCOUNTER
Caller: Azael Hall    Relationship: Self    Best call back number: [phone]    Who are you requesting to speak with (clinical staff, provider,  specific staff member): LEORA IN LAB REVIEW    What was the call regarding: AZAEL WAS RETURNING A CALL TO LEORA TO GO OVER HER LAB RESULTS.    Do you require a callback: YES

## 2021-10-13 NOTE — NURSING NOTE
Pt presents for CBC w/ RN review. Hgb 7.4 today and pt reports increased fatigue and SOA. R/w Maritza NP, order received for 2 units of blood. Pt to lab for T&C, message sent to scheduling for transfusion appt.    Lab Results   Component Value Date    WBC 4.16 10/13/2021    HGB 7.4 (C) 10/13/2021    HCT 22.5 (L) 10/13/2021    .1 (H) 10/13/2021     10/13/2021     CMP resulted, creatine further elevated today. K+ 5.0 and sodium 129. Called patient to review. She recently dropped from 3 days a week to 2 days a week of dialysis and asked if this might impact the creatinine. Advised pt this nurse wasn't sure and that she should contact her nephrologist's office in case they want to make any changes to treatment or start sodium tablets. Pt v/u. CMP routed to Dr. Tai Antonio.    Lab Results   Component Value Date    GLUCOSE 134 (H) 10/13/2021    BUN 35 (H) 10/13/2021    CREATININE 4.23 (C) 10/13/2021    EGFRIFNONA 11 (L) 10/13/2021    EGFRIFAFRI  10/13/2021      Comment:      <15 Indicative of kidney failure.    BCR 8.3 10/13/2021    K 5.0 (H) 10/13/2021    CO2 20.1 (L) 10/13/2021    CALCIUM 8.7 10/13/2021    PROTENTOTREF 7.0 03/16/2019    ALBUMIN 3.50 10/13/2021    LABIL2 1.2 03/16/2019    AST 11 10/13/2021    ALT 6 10/13/2021

## 2021-10-26 ENCOUNTER — HOSPITAL ENCOUNTER (OUTPATIENT)
Dept: PET IMAGING | Facility: HOSPITAL | Age: 61
Discharge: HOME | End: 2021-10-26
Admitting: INTERNAL MEDICINE

## 2021-10-26 DIAGNOSIS — C50.919 METASTATIC BREAST CANCER: ICD-10-CM

## 2021-10-26 PROCEDURE — 71250 CT THORAX DX C-: CPT

## 2021-10-26 PROCEDURE — 0 DIATRIZOATE MEGLUMINE & SODIUM PER 1 ML: Performed by: INTERNAL MEDICINE

## 2021-10-26 PROCEDURE — 74176 CT ABD & PELVIS W/O CONTRAST: CPT

## 2021-10-26 RX ADMIN — DIATRIZOATE MEGLUMINE AND DIATRIZOATE SODIUM 30 ML: 660; 100 LIQUID ORAL; RECTAL at 13:03

## 2021-10-26 NOTE — PROGRESS NOTES
"Chief Complaint  Metastatic lobular breast cancer (ER/IL positive, HER-2/tj negative), anemia secondary to CKD3, CHF, peripheral neuropathy, chemotherapy related nausea chemotherapy-induced myelosuppression    Subjective        History of Present Illness  Patient returns today in follow-up continuing on Ibrance and Aromasin.  She is currently taking Ibrance on a schedule of 100 mg daily for 7 days on followed by 7 days off due to ongoing difficulty with severe anemia requiring intermittent transfusion support.  She did experience improvement in her fatigue and and her anemia after change in dosing.  More recently however her anemia has again worsened with decline in hemoglobin on 10/13/2021 down to 7.4 requiring 2 unit PRBC transfusion.  She reports in the interval that she has experienced worsening fatigue overall.  She returns today with laboratory studies and CT scans to review.  She notes that her dialysis has been reduced to 2 days/week on Tuesdays and Saturdays.  She is scheduled for AV fistula placement by vascular surgery on 11/3/2021.  She is scheduled for further follow-up with urology on 11/8/2021.  She has not had any other significant complaints apart from fatigue recently.  She denies any abdominal or back pain.  She does produce a significant amount of urine but denies any dysuria or hematuria.  She notes normal bowel function and denies any evidence of blood in her stool.  She has not experienced any fevers or night sweats.  She reports that she is continuing to receive maximum dose of Epogen with dialysis and did receive IV iron at least 1 day last week with dialysis as well.  Patient does remain in a motorized scooter today..       Objective   Vital Signs:   /66   Pulse 66   Temp 98.8 °F (37.1 °C) (Oral)   Resp 18   Ht 170.2 cm (67.01\")   Wt (!) 165 kg (363 lb 8 oz) Comment: pt not able to stand and stated WT  BMI 56.92 kg/m²     Physical Exam  Constitutional:       Appearance: She is " well-developed. She is obese.      Comments: Patient is in a motorized scooter today   Eyes:      Conjunctiva/sclera: Conjunctivae normal.   Neck:      Thyroid: No thyromegaly.   Cardiovascular:      Rate and Rhythm: Normal rate and regular rhythm.      Heart sounds: Murmur heard.   No friction rub. No gallop.       Comments: 2/6 murmur  Pulmonary:      Effort: No respiratory distress.      Breath sounds: Normal breath sounds.      Comments: Dialysis access in place in the right subclavian position  Chest:   Breasts:      Right: No supraclavicular adenopathy.      Left: No supraclavicular adenopathy.       Abdominal:      General: Bowel sounds are normal. There is no distension.      Palpations: Abdomen is soft.      Tenderness: There is no abdominal tenderness.   Musculoskeletal:         General: Swelling present.      Comments: Trace bilateral lower extremity edema   Lymphadenopathy:      Head:      Right side of head: No submandibular adenopathy.      Cervical: No cervical adenopathy.      Upper Body:      Right upper body: No supraclavicular adenopathy.      Left upper body: No supraclavicular adenopathy.   Skin:     General: Skin is warm and dry.      Findings: No bruising or rash.   Neurological:      Mental Status: She is alert and oriented to person, place, and time.      Cranial Nerves: No cranial nerve deficit.      Motor: No abnormal muscle tone.      Deep Tendon Reflexes: Reflexes normal.   Psychiatric:         Behavior: Behavior normal.        Patient was examined today, unchanged from above.    Result Review : Reviewed CBC, CMP from today.  Reviewed CA 15-3 from 10/13/2021.  Reviewed CT chest abdomen pelvis from 10/26/2021.       Assessment and Plan    1. Metastatic lobular breast cancer (ER/KS positive, HER-2/tj negative):  · History of stage IIIC right breast cancer ([id number])  · Patient treated previously in the [facility] system  · Diagnosis via excisional biopsy 8/23/2003 with lobular carcinoma,  4.5 cm, positive margins.  ER/DC positive, HER-2/jt negative.  · Staging evaluation in September 2003 with negative bone scan and negative CT scans.  Ejection fraction 65%.  · Received neoadjuvant chemotherapy (? GET regimen) which apparently included Taxotere and possibly epirubicin.  Received 6 cycles.  · 1/22/2004 bilateral mastectomy with right axillary dissection.  Per available records, 10-12 cm residual invasive lobular carcinoma in the breast with 14/16 lymph nodes positive with extranodal extension.  Immediate reconstruction.  · Received adjuvant chemotherapy with carboplatin and navelbine x4 cycles  · Initiated adjuvant tamoxifen 6/22/2004  · Adjuvant radiation therapy initiated but interrupted due to chest wall cellulitis requiring removal of implants in August 2004  · Implant reconstruction again attempted with MRSA infection, implant removed 12/30/2005 with no further attempts made and reconstruction.  · Completed 5 years tamoxifen in June 2009 and subsequently transitioned to Femara.  Received Femara until June 2014.  · Patient experienced postmenopausal vaginal bleeding in 2013, negative endometrial biopsy and gynecologic evaluation.  · Patient last seen in [facility] 6/18/2014  · CT chest performed to evaluate bicuspid aortic valve and dilated asending aorta 7/12/2019.  CT showed no change in aorta however showed new free intraperitoneal fluid in the upper abdomen with mesenteric edema, concerning for carcinomatosis.  · CT abdomen and pelvis (without IV contrast) on 7/16/2019 with mesenteric thickening, small volume of complex fluid in the mesentery which was suspicious and possibly representative of carcinomatosis, small amount of perihepatic fluid, small bowel loops tethered to omentum without obstruction, omental thickening, shotty retroperitoneal and pelvic lymph nodes (non-pathologically enlarged).  · PET scan 7/26/2019 with hypermetabolic omental activity, hypermetabolic small  retroperitoneal lymph nodes, hypermetabolic activity throughout uterus with increase in size.  · CT-guided omental biopsy 7/30/2019 with metastatic lobular carcinoma of breast primary, ER positive (greater than 95%), MD positive (90%), HER-2/tj negative (1+ IHC)  · Baseline CA 15-3 on 7/22/19 was 32.6  · Initiation of Aromasin 25 mg daily 8/12/2019.  Initiated Ibrance at 100 mg 21/28 days on 8/26/2019.  · Improvement in CA 15-3 on 9/17/19-26.3  · Following 2 cycles of Aromasin and Ibrance, CA 15-3 declined to 25.9 on 10/15/2019.  CT scan chest abdomen pelvis 10/16/2019 showed improvement in the mesenteric and omental thickening and near resolution of complex ascites.  Treatment continued.  · Stable findings on subsequent CT chest abdomen pelvis 12/11/2019.  Further improvement in CA 15-3-23.9 on 12/18/2019.  Ibrance/Aromasin continued.   · [facility] liquid biopsy 2/5/2020: MSI undetermined, mutations in BETH, NF1, CHEK2.  No evidence of PIK3CA mutation.  · Slight increase in CA 15-3 to 27.1 on 2/19/2020 and 29.1 on 3/18/2020.  CT however on 3/13/2020 with no new findings.  · Subsequent improvement in CA 15-3-26.5 on 5/15/2020  · CT 7/31/2020 showed evidence of stable disease.  CA 15-3 declined further to 23.1 on 7/24/2020.  · CT 10/19/2020 with stable disease.  Fluctuations in CA 15-3 of unclear significance.  · CA 15-3 on 1/6/2021 decreased to 23.  CT chest abdomen pelvis 1/29/2021 with stable findings.  · CA 15-3 on 4/1/2021 was 28.4  · CT 4/22/2021 with no evidence of progressive malignancy, notation of new focal linear subpleural opacification right upper lobe felt to be infectious and/or inflammatory.  Enlargement of 2 staghorn calculi left kidney.  Persistent left perinephric stranding.    · Hospitalization 4/29-5/4/2021 with RYAN/CKD, UTI, anemia.  Required 2 unit PRBC transfusion and initiated hemodialysis Tuesday Thursday Saturday.  Brief interruption in Ibrance during hospitalization.  · CA 15-3  on 5/27/2021 was 27.8.  · CT chest abdomen pelvis 7/2/2021 showed no evidence of progression or new metastatic disease.  There was only one remaining left renal stone which was smaller than previous exam.  Stranding surrounding both kidneys left greater than right with left hydronephrosis and hydroureter.  · CA 15-3 7/7/2021 was 25.3  · Patient experienced severe ongoing anemia felt to be in part related to Ibrance requiring transfusion support intermittently.  Ibrance dose decreased on 8/23/2020 1 to 100 mg daily for 7 days on followed by 7 days off.  · Stable CA 15-3 on 9/1/2021 at 25.5 and again 10/13/2021 at 25.6.  · CT chest abdomen pelvis 10/26/2021 with increase in left hydronephrosis with increase in left perinephric and periureteral stranding.  Thought to be possibly related to recent passage of left ureteral stone, partial obstruction from debris, or thrombus in the left ureter, or pyelonephritis.  Decrease in stone in the left kidney lower pole (7 mm).  Stable small retroperitoneal lymph and left periaortic lymph nodes.  · Patient returns today in follow-up continuing on Ibrance at 100 mg daily for 7 days on followed by 7 days off (currently on week off due to be resume on 11/1/2021) along with Aromasin 25 mg daily.  She appears to be tolerating this relatively well.  She has experienced again significant worsening of her anemia requiring transfusion support (see below), unclear whether this could be related still to Ibrance.  She continues with a stable CA 15-3 at 25.6 on 10/13/2021.  We reviewed her CT scan as noted above from 10/26/2021.  There has been an increase in left hydronephrosis with increase in left perinephric and periureteral stranding.  There does not appear to be any clinical evidence of recent stone passage, patient denies any dysuria or hematuria, no flank pain or abdominal pain.  There is no clinical evidence of pyelonephritis.  I do have concern that the findings could be related to  her underlying malignancy.  We did discuss obtaining PET scan to further evaluate the area.  Patient is in agreement.  We have PET scan performed in 1 week and I will see her back in 2 weeks to review results.  She will also be seeing urology on 11/8/2021 by her report.  2. Anemia secondary to ESRD, exacerbated by Ibrance:  · The patient appears to have a chronic anemia with hemoglobin in the 10-11 range with normal MCV.  · Patient has underlying CKD3 with baseline creatinine recently in the 1.5-1.8 range.  · Anemia evaluation 7/22/2019 with iron 30, ferritin 85.2, iron saturation 10%, TIBC 311, B12 370, erythropoietin level 20.4  · Anemia felt in large part to be related to CKD3 as well as to underlying malignancy  · Evidence of folate deficiency 8/12/2019 with level 3.84, initiated folic acid 1 mg daily  · Additional labs on 12/18/2019 with iron 73, ferritin 82.2, iron saturation 25%, TIBC 290.  We did discuss the potential use of Procrit if her hemoglobin declines into the low 9/upper 8 range.  · Decline in hemoglobin into the 8 range, iron studies 7/20/2020 with iron 54, ferritin 66.7, iron saturation 16%, TIBC 328.  On 8/7/2020 proceeded with Injectafer 750 mg IV x1 dose.  Second dose not administered due to onset of fever, subsequent iron studies precluded further administration of IV iron.  · Initiated Procrit 20,000 units every 4 weeks on 9/4/2020.  · On 1/6/2021, hemoglobin declined significantly to 7.1.  This was repeated and verified at 7.2.  No evidence of GI blood loss, stool cards negative for occult blood x3 on 1/12/2021.  Additional labs on 1/6/2021 with iron 47, ferritin 430, iron saturation 20%, TIBC 235, folate greater than 20, B12 324, haptoglobin 525, .  · Transfused 2 unit PRBC on 1/7/2021  · Change in Procrit dosing to weekly  · Due to low normal B12 level initiated oral B12 1000 mcg daily.  · Patient with symptomatic decline in hemoglobin to 7.7 on 4/1/2021, received 1 unit PRBC  transfusion  · Hospitalization 4/29-5/4/2021 with RYAN/CKD, UTI, anemia.  Required 2 unit PRBC transfusion and initiated hemodialysis Tuesday Thursday Saturday.  · Transfused 2 units PRBC for hemoglobin 7.2 on 5/27/2021  · Following initiation of hemodialysis, management of BEAU transitioned to nephrology, receiving Epogen with dialysis.  · On 7/21/2021, decline in hemoglobin to 6.5 requiring additional transfusion support.  On 7/21/2021, additional anemia evaluation with iron 17, ferritin 646, iron saturation 9%, TIBC 193, folate greater than 20, B12 690.  Contacted Dr. Antonio in nephrology and confirmed that she was receiving maximum dose Epogen with dialysis.   · Patient received additional transfusion with hemoglobin down to 7.4 on 8/18/2021.  · Ongoing transfusion support prompted alteration in Ibrance dosing on 8/23/2021 as outlined above.  · After alteration in Ibrance dosing, hemoglobin improved and remained stable in the 9-10 range.  · Subsequent worsening of anemia with hemoglobin down to 7.4 on 10/13/2021 requiring transfusion 2 unit PRBC  · Patient returns today with hemoglobin of 7.0 despite transfusion tonight PRBC after value of 7.42 weeks ago.  She has had no clinical evidence of GI blood loss.  We will check stool cards for occult blood x3.  We will go ahead and recheck anemia evaluation with iron panel, ferritin, B12, folate.  Patient reports that she has been receiving intermittent IV iron with dialysis, last received a week ago.  Unclear whether this represents anemia secondary to chronic disease as well although she does not appear to have any current evidence of infection or any other significant issues beyond her malignancy.  Unclear of Ibrance is still playing a role.  She has decreased her dialysis frequency to twice per week and therefore is receiving less erythropoietin support and this may be a contributing factor as well.  We did notify nephrology of her upcoming transfusion in regards to  timing for dialysis.  3. ESRD on HD:  · Baseline creatinine recently in 1.6-2.0 range  · On 9/10/2019, RYAN/CKD3 with creatinine up to 2.44, BUN of 40.  Received 1 L normal saline.  Patient with increase in oral fluid intake.  · Renal ultrasound 9/20/2019 with no evidence of obstructive uropathy.  · Diminished oral intake due to nausea from Ibrance, creatinine 2.79 with BUN 43 on 10/15/2019.  Received 1 L normal saline.  Repeat labs on 10/18/2019 with BUN 31, creatinine 2.0.  · During cycle 3 Ibrance, patient developed increase in creatinine on 11/6/2019 to 2.35 and received 1 L normal saline.  · Patient is trying to maintain adequate oral hydration.    · Gradual escalation of creatinine into the 2-2.4 range and subsequently into the 2.5-2.8 range  · Creatinine increased into the low 3 range in early January 2021.  Patient increased oral hydration with improvement back into the mid 2 range.  · Hospitalization 4/29-5/4/2021 with RYAN/CKD, UTI, anemia.  Required 2 unit PRBC transfusion and initiated hemodialysis Tuesday Thursday Saturday.   · Patient reports recent decrease in frequency of dialysis to twice per week on Tuesdays and Saturdays.  She continues to produce a significant amount of urine.  There are plans for placement of AV fistula by vascular surgery on 11/3/2021.  4. CHF with mild to moderate aortic stenosis:  · Recent cardiology evaluation with echocardiogram 7/12/2019 showing ejection fraction 48% with moderate dilation of the LV cavity, global hypokinesis, grade 2 diastolic dysfunction, mild to moderate aortic stenosis, trivial pericardial effusion.  · CT chest 7/12/2019 with no change in the aorta compared to prior study 12/13/2017.  · Echocardiogram 7/19/2021 with ejection fraction 56%, left atrial volume moderately increased, grade 2 diastolic dysfunction, severe calcification aortic valve, moderate aortic stenosis, severe mitral calcification, mild mitral stenosis, marked elevation in RVSP from  tricuspid regurgitation.  · Patient notes that she discussed echo results with cardiology and valvular status was felt to be stable in regards to aortic stenosis.  5. Left upper and bilateral lower extremity peripheral neuropathy:  · Patient reports that left upper extremity neuropathy was not present from previous chemotherapy but occurred after hospitalization that required intubation and critical care stay.  Apparently felt to represent critical care neuropathy.  Improved over time.  · Bilateral lower extremity neuropathy felt to be related to diabetes  · May have future implications regarding treatment for breast cancer.  6. Uterine/endometrial activity on PET scan:  · Patient experienced postmenopausal vaginal bleeding in 2013, negative endometrial biopsy and gynecologic evaluation.  · With findings on PET scan with enlarging uterus and some hypermetabolic activity, referred back to Dr. Oliveros and gynecology.    · 9/19/2019 D&C with hysteroscopy by Dr. Oliveros, no significant intraoperative findings, pathologic results with benign findings, no evidence of malignancy.  7. Nausea:  · Mild, relieved with Zofran.   · Patient experienced improvement in nausea after initiating hemodialysis  8. Myelosuppression secondary to Ibrance:  · With cycle 1, jessica WBC 2.49 with ANC 1.25 and platelet count 140,000.  · With cycle 2, jessica WBC 2.33 with ANC 1.06, maintained normal platelet count  · Today, WBC 6.21 with ANC 5.02, platelet count 302,000  9. Depression  · When seen on 3/4/2021, patient indicated increasing symptoms of depression.  She did not wish to discuss this further at the time.  She indicated that she was coping adequately.  She was offered referral to the supportive oncology clinic however declined this.  10. Recurrent UTI with enlarging staghorn calculi in the left kidney  · CT 4/22/2021 showed interval enlargement of 2 staghorn calculi in the left kidney with persistent left perinephric  stranding  · Hospitalization 4/29-5/4/2021 with RYAN/CKD, UTI, anemia.  Required 2 unit PRBC transfusion and initiated hemodialysis Tuesday Thursday Saturday.    · Discussion from urology regarding potential need for surgical procedure to address staghorn calculus left kidney  · CT scan 7/2/2021 with only 1 remaining left renal stone identified which had decreased in size.  Patient appears to have passed the other stone.  · As above, CT 10/26/2021 with more prominent left hydronephrosis and increase in left perinephric and periureteral stranding.  Felt to possibly be related to passage of recent stone versus partial obstruction secondary to debris or thrombus in the left ureter versus pyelonephritis.  Patient however with no clinical evidence of recent stone passage nor pyelonephritis.  Concern for malignancy as cause of left hydronephrosis, evaluating further with PET scan as outlined above.  Patient scheduled to see urology on 11/8/2021 as well.    Plan:  1. Continue Ibrance 100 mg daily for 7 days on followed by 7 days off (due to start next week of Ibrance on 11/1/2021).  2. Continue Aromasin 25 mg daily   3. Schedule transfusion 2 unit PRBC in 1 to 2 days.  We R notify nephrology of the transfusion in regards to timeframe until next dialysis/volume status.  4. Additional labs today with iron panel, ferritin, B12, folate, CRP  5. The patient will collect and return stool cards for occult blood x3.  6. The patient continues on hemodialysis Tuesday and Saturday (2 days/week currently)  7. Nephrology is currently managing the patient's anemia, patient receiving Epogen with dialysis as well as intermittent IV iron.  8. Continue oral folic acid 1 mg daily, B12 1000 mcg daily.  9. PET scan in 1 week  10. Patient is scheduled for upcoming AV fistula placement by vascular surgery on 11/3/2021  11. Patient has follow-up scheduled with urology on 11/8/2021 by her report  12. In 1 week CBC with RN review  13. In 2 weeks MD  visit with CBC, CMP     Patient continues on high risk medication requiring intensive monitoring.

## 2021-10-27 ENCOUNTER — LAB (OUTPATIENT)
Dept: LAB | Facility: HOSPITAL | Age: 61
End: 2021-10-27

## 2021-10-27 ENCOUNTER — TELEPHONE (OUTPATIENT)
Dept: ONCOLOGY | Facility: CLINIC | Age: 61
End: 2021-10-27

## 2021-10-27 ENCOUNTER — OFFICE VISIT (OUTPATIENT)
Dept: ONCOLOGY | Facility: CLINIC | Age: 61
End: 2021-10-27

## 2021-10-27 VITALS
HEIGHT: 67 IN | DIASTOLIC BLOOD PRESSURE: 66 MMHG | TEMPERATURE: 98.8 F | HEART RATE: 66 BPM | WEIGHT: 293 LBS | BODY MASS INDEX: 45.99 KG/M2 | RESPIRATION RATE: 18 BRPM | SYSTOLIC BLOOD PRESSURE: 108 MMHG

## 2021-10-27 DIAGNOSIS — N18.6 ANEMIA DUE TO CHRONIC KIDNEY DISEASE, ON CHRONIC DIALYSIS (HCC): ICD-10-CM

## 2021-10-27 DIAGNOSIS — C50.919 METASTATIC BREAST CANCER: Primary | ICD-10-CM

## 2021-10-27 DIAGNOSIS — Z99.2 ANEMIA DUE TO CHRONIC KIDNEY DISEASE, ON CHRONIC DIALYSIS (HCC): ICD-10-CM

## 2021-10-27 DIAGNOSIS — D63.8 ANEMIA, CHRONIC DISEASE: ICD-10-CM

## 2021-10-27 DIAGNOSIS — N18.32 STAGE 3B CHRONIC KIDNEY DISEASE (HCC): Primary | ICD-10-CM

## 2021-10-27 DIAGNOSIS — C50.919 METASTATIC BREAST CANCER: ICD-10-CM

## 2021-10-27 DIAGNOSIS — D63.1 ANEMIA DUE TO CHRONIC KIDNEY DISEASE, ON CHRONIC DIALYSIS (HCC): ICD-10-CM

## 2021-10-27 DIAGNOSIS — N18.30 ANEMIA IN STAGE 3 CHRONIC KIDNEY DISEASE, UNSPECIFIED WHETHER STAGE 3A OR 3B CKD (HCC): ICD-10-CM

## 2021-10-27 DIAGNOSIS — D63.1 ANEMIA IN STAGE 3 CHRONIC KIDNEY DISEASE, UNSPECIFIED WHETHER STAGE 3A OR 3B CKD (HCC): ICD-10-CM

## 2021-10-27 LAB
ABO GROUP BLD: NORMAL
ALBUMIN SERPL-MCNC: 3.2 G/DL (ref 3.5–5.2)
ALBUMIN/GLOB SERPL: 0.8 G/DL (ref 1.1–2.4)
ALP SERPL-CCNC: 80 U/L (ref 38–116)
ALT SERPL W P-5'-P-CCNC: 7 U/L (ref 0–33)
ANION GAP SERPL CALCULATED.3IONS-SCNC: 11.7 MMOL/L (ref 5–15)
AST SERPL-CCNC: 8 U/L (ref 0–32)
BASOPHILS # BLD AUTO: 0.07 10*3/MM3 (ref 0–0.2)
BASOPHILS NFR BLD AUTO: 1.1 % (ref 0–1.5)
BILIRUB SERPL-MCNC: 0.8 MG/DL (ref 0.2–1.2)
BLD GP AB SCN SERPL QL: NEGATIVE
BUN SERPL-MCNC: 30 MG/DL (ref 6–20)
BUN/CREAT SERPL: 7.5 (ref 7.3–30)
CALCIUM SPEC-SCNC: 8.3 MG/DL (ref 8.5–10.2)
CHLORIDE SERPL-SCNC: 97 MMOL/L (ref 98–107)
CO2 SERPL-SCNC: 24.3 MMOL/L (ref 22–29)
CREAT SERPL-MCNC: 3.99 MG/DL (ref 0.6–1.1)
CRP SERPL-MCNC: 18.06 MG/DL (ref 0–0.5)
DEPRECATED RDW RBC AUTO: 63.9 FL (ref 37–54)
EOSINOPHIL # BLD AUTO: 0.05 10*3/MM3 (ref 0–0.4)
EOSINOPHIL NFR BLD AUTO: 0.8 % (ref 0.3–6.2)
ERYTHROCYTE [DISTWIDTH] IN BLOOD BY AUTOMATED COUNT: 16.6 % (ref 12.3–15.4)
FERRITIN SERPL-MCNC: 797.4 NG/ML (ref 13–150)
FOLATE SERPL-MCNC: >20 NG/ML (ref 4.78–24.2)
GFR SERPL CREATININE-BSD FRML MDRD: 11 ML/MIN/1.73
GFR SERPL CREATININE-BSD FRML MDRD: ABNORMAL ML/MIN/{1.73_M2}
GLOBULIN UR ELPH-MCNC: 4 GM/DL (ref 1.8–3.5)
GLUCOSE SERPL-MCNC: 273 MG/DL (ref 74–124)
HCT VFR BLD AUTO: 22.2 % (ref 34–46.6)
HGB BLD-MCNC: 7 G/DL (ref 12–15.9)
IMM GRANULOCYTES # BLD AUTO: 0.07 10*3/MM3 (ref 0–0.05)
IMM GRANULOCYTES NFR BLD AUTO: 1.1 % (ref 0–0.5)
IRON 24H UR-MRATE: 14 MCG/DL (ref 37–145)
IRON SATN MFR SERPL: 7 % (ref 14–48)
LYMPHOCYTES # BLD AUTO: 0.74 10*3/MM3 (ref 0.7–3.1)
LYMPHOCYTES NFR BLD AUTO: 11.9 % (ref 19.6–45.3)
MCH RBC QN AUTO: 33.2 PG (ref 26.6–33)
MCHC RBC AUTO-ENTMCNC: 31.5 G/DL (ref 31.5–35.7)
MCV RBC AUTO: 105.2 FL (ref 79–97)
MONOCYTES # BLD AUTO: 0.26 10*3/MM3 (ref 0.1–0.9)
MONOCYTES NFR BLD AUTO: 4.2 % (ref 5–12)
NEUTROPHILS NFR BLD AUTO: 5.02 10*3/MM3 (ref 1.7–7)
NEUTROPHILS NFR BLD AUTO: 80.9 % (ref 42.7–76)
NRBC BLD AUTO-RTO: 0 /100 WBC (ref 0–0.2)
PLATELET # BLD AUTO: 302 10*3/MM3 (ref 140–450)
PMV BLD AUTO: 9.1 FL (ref 6–12)
POTASSIUM SERPL-SCNC: 3.8 MMOL/L (ref 3.5–4.7)
PROT SERPL-MCNC: 7.2 G/DL (ref 6.3–8)
RBC # BLD AUTO: 2.11 10*6/MM3 (ref 3.77–5.28)
RH BLD: POSITIVE
SODIUM SERPL-SCNC: 133 MMOL/L (ref 134–145)
T&S EXPIRATION DATE: NORMAL
TIBC SERPL-MCNC: 213 MCG/DL (ref 249–505)
TRANSFERRIN SERPL-MCNC: 152 MG/DL (ref 200–360)
VIT B12 BLD-MCNC: 1452 PG/ML (ref 211–946)
WBC # BLD AUTO: 6.21 10*3/MM3 (ref 3.4–10.8)

## 2021-10-27 PROCEDURE — 82728 ASSAY OF FERRITIN: CPT | Performed by: INTERNAL MEDICINE

## 2021-10-27 PROCEDURE — 99215 OFFICE O/P EST HI 40 MIN: CPT | Performed by: INTERNAL MEDICINE

## 2021-10-27 PROCEDURE — 80053 COMPREHEN METABOLIC PANEL: CPT

## 2021-10-27 PROCEDURE — 83540 ASSAY OF IRON: CPT | Performed by: INTERNAL MEDICINE

## 2021-10-27 PROCEDURE — 84466 ASSAY OF TRANSFERRIN: CPT | Performed by: INTERNAL MEDICINE

## 2021-10-27 PROCEDURE — 86140 C-REACTIVE PROTEIN: CPT | Performed by: INTERNAL MEDICINE

## 2021-10-27 PROCEDURE — 86923 COMPATIBILITY TEST ELECTRIC: CPT

## 2021-10-27 PROCEDURE — 85025 COMPLETE CBC W/AUTO DIFF WBC: CPT

## 2021-10-27 PROCEDURE — 82746 ASSAY OF FOLIC ACID SERUM: CPT | Performed by: INTERNAL MEDICINE

## 2021-10-27 PROCEDURE — 82607 VITAMIN B-12: CPT | Performed by: INTERNAL MEDICINE

## 2021-10-27 PROCEDURE — 86901 BLOOD TYPING SEROLOGIC RH(D): CPT | Performed by: INTERNAL MEDICINE

## 2021-10-27 PROCEDURE — 86900 BLOOD TYPING SEROLOGIC ABO: CPT | Performed by: INTERNAL MEDICINE

## 2021-10-27 PROCEDURE — 36415 COLL VENOUS BLD VENIPUNCTURE: CPT

## 2021-10-27 PROCEDURE — 86850 RBC ANTIBODY SCREEN: CPT | Performed by: INTERNAL MEDICINE

## 2021-10-27 RX ORDER — ACETAMINOPHEN 325 MG/1
650 TABLET ORAL ONCE
Status: CANCELLED | OUTPATIENT
Start: 2021-10-29 | End: 2021-10-27

## 2021-10-27 RX ORDER — DIPHENHYDRAMINE HCL 25 MG
25 CAPSULE ORAL ONCE
Status: CANCELLED | OUTPATIENT
Start: 2021-10-29 | End: 2021-10-27

## 2021-10-27 RX ORDER — SODIUM CHLORIDE 9 MG/ML
250 INJECTION, SOLUTION INTRAVENOUS AS NEEDED
Status: CANCELLED | OUTPATIENT
Start: 2021-10-29

## 2021-10-27 NOTE — TELEPHONE ENCOUNTER
Phoned Dr. Tai Antonio with [facility] regarding patient's need for blood transfusion. Per Dr. Irvin, patient to receive 2 units PRBC. Confirmed with Dr. Antonio that he is agreeable with volume patient is to receive. Patient to get transfusion on Friday, 10/29/21, and will get dialysis as scheduled on Saturday, 10/30/21.

## 2021-10-29 ENCOUNTER — HOSPITAL ENCOUNTER (OUTPATIENT)
Dept: INFUSION THERAPY | Facility: HOSPITAL | Age: 61
Setting detail: INFUSION SERIES
Discharge: HOME | End: 2021-10-29

## 2021-10-29 VITALS
RESPIRATION RATE: 16 BRPM | TEMPERATURE: 97.3 F | OXYGEN SATURATION: 95 % | DIASTOLIC BLOOD PRESSURE: 55 MMHG | SYSTOLIC BLOOD PRESSURE: 118 MMHG | HEART RATE: 63 BPM

## 2021-10-29 DIAGNOSIS — Z99.2 ANEMIA DUE TO CHRONIC KIDNEY DISEASE, ON CHRONIC DIALYSIS (HCC): ICD-10-CM

## 2021-10-29 DIAGNOSIS — N18.6 ANEMIA DUE TO CHRONIC KIDNEY DISEASE, ON CHRONIC DIALYSIS (HCC): ICD-10-CM

## 2021-10-29 DIAGNOSIS — D63.1 ANEMIA DUE TO CHRONIC KIDNEY DISEASE, ON CHRONIC DIALYSIS (HCC): ICD-10-CM

## 2021-10-29 PROCEDURE — 86900 BLOOD TYPING SEROLOGIC ABO: CPT

## 2021-10-29 PROCEDURE — P9016 RBC LEUKOCYTES REDUCED: HCPCS

## 2021-10-29 PROCEDURE — 63710000001 DIPHENHYDRAMINE PER 50 MG: Performed by: INTERNAL MEDICINE

## 2021-10-29 PROCEDURE — 36430 TRANSFUSION BLD/BLD COMPNT: CPT

## 2021-10-29 RX ORDER — SODIUM CHLORIDE 9 MG/ML
250 INJECTION, SOLUTION INTRAVENOUS AS NEEDED
Status: DISCONTINUED | OUTPATIENT
Start: 2021-10-29 | End: 2021-10-31 | Stop reason: HOSPADM

## 2021-10-29 RX ORDER — DIPHENHYDRAMINE HCL 25 MG
25 CAPSULE ORAL ONCE
Status: COMPLETED | OUTPATIENT
Start: 2021-10-29 | End: 2021-10-29

## 2021-10-29 RX ORDER — ACETAMINOPHEN 325 MG/1
650 TABLET ORAL ONCE
Status: COMPLETED | OUTPATIENT
Start: 2021-10-29 | End: 2021-10-29

## 2021-10-29 RX ADMIN — DIPHENHYDRAMINE HYDROCHLORIDE 25 MG: 25 CAPSULE ORAL at 08:23

## 2021-10-29 RX ADMIN — ACETAMINOPHEN 650 MG: 325 TABLET, FILM COATED ORAL at 08:23

## 2021-11-01 DIAGNOSIS — F32.A ANXIETY AND DEPRESSION: ICD-10-CM

## 2021-11-01 DIAGNOSIS — F41.9 ANXIETY AND DEPRESSION: ICD-10-CM

## 2021-11-02 ENCOUNTER — PRE-ADMISSION TESTING (OUTPATIENT)
Dept: PREADMISSION TESTING | Facility: HOSPITAL | Age: 61
End: 2021-11-02

## 2021-11-02 ENCOUNTER — TELEPHONE (OUTPATIENT)
Dept: ONCOLOGY | Facility: CLINIC | Age: 61
End: 2021-11-02

## 2021-11-02 VITALS
SYSTOLIC BLOOD PRESSURE: 127 MMHG | DIASTOLIC BLOOD PRESSURE: 79 MMHG | OXYGEN SATURATION: 96 % | WEIGHT: 293 LBS | RESPIRATION RATE: 16 BRPM | BODY MASS INDEX: 45.99 KG/M2 | HEART RATE: 96 BPM | HEIGHT: 67 IN

## 2021-11-02 LAB
QT INTERVAL: 435 MS
SARS-COV-2 RNA PNL SPEC NAA+PROBE: NOT DETECTED

## 2021-11-02 PROCEDURE — 93005 ELECTROCARDIOGRAM TRACING: CPT

## 2021-11-02 PROCEDURE — 87635 SARS-COV-2 COVID-19 AMP PRB: CPT

## 2021-11-02 PROCEDURE — C9803 HOPD COVID-19 SPEC COLLECT: HCPCS

## 2021-11-02 PROCEDURE — 93010 ELECTROCARDIOGRAM REPORT: CPT | Performed by: INTERNAL MEDICINE

## 2021-11-02 NOTE — DISCHARGE INSTRUCTIONS
Take the following medications the morning of surgery with a small sip of water: NONE    ARRIVAL TIME:730AM         General Instructions:  • Do not eat or drink anything after midnight the night before surgery.  • Patients who avoid smoking, chewing tobacco and alcohol for 4 weeks prior to surgery have a reduced risk of post-operative complications.  Quit smoking as many days before surgery as you can.  • Do not smoke, use chewing tobacco or drink alcohol the day of surgery.   • If applicable bring your C-PAP/ BI-PAP machine.  • Bring any papers given to you in the doctor’s office.  • Wear clean comfortable clothes.  • Do not wear contact lenses, false eyelashes or make-up.  Bring a case for your glasses.   • Bring crutches or walker if applicable.  • Remove all piercings.  Leave jewelry and any other valuables at home.  • Hair extensions with metal clips must be removed prior to surgery.  • The Pre-Admission Testing nurse will instruct you to bring medications if unable to obtain an accurate list in Pre-Admission Testing.        Preventing a Surgical Site Infection:  • For 2 to 3 days before surgery, avoid shaving with a razor because the razor can irritate skin and make it easier to develop an infection.    • Any areas of open skin can increase the risk of a post-operative wound infection by allowing bacteria to enter and travel throughout the body.  Notify your surgeon if you have any skin wounds / rashes even if it is not near the expected surgical site.  The area will need assessed to determine if surgery should be delayed until it is healed.  • The night prior to surgery shower using a fresh bar of anti-bacterial soap (such as Dial) and clean washcloth.  Sleep in a clean bed with clean clothing.  Do not allow pets to sleep with you.  • Shower on the morning of surgery using a fresh bar of anti-bacterial soap (such as Dial) and clean washcloth.  Dry with a clean towel and dress in clean clothing.  • Ask your  surgeon if you will be receiving antibiotics prior to surgery.  • Make sure you, your family, and all healthcare providers clean their hands with soap and water or an alcohol based hand  before caring for you or your wound.    Day of surgery:  Your arrival time is approximately two hours before your scheduled surgery time.  Upon arrival, a Pre-op nurse and Anesthesiologist will review your health history, obtain vital signs, and answer questions you may have.  The only belongings needed at this time will be your home medications and if applicable your C-PAP/BI-PAP machine.  A Pre-op nurse will start an IV and you may receive medication in preparation for surgery, including something to help you relax.      Please be aware that surgery does come with discomfort.  We want to make every effort to control your discomfort so please discuss any uncontrolled symptoms with your nurse.   Your doctor will most likely have prescribed pain medications.      If you are going home after surgery you will receive individualized written care instructions before being discharged.  A responsible adult must drive you to and from the hospital on the day of your surgery and stay with you for 24 hours.  Discharge prescriptions can be filled by the hospital pharmacy during regular pharmacy hours.  If you are having surgery late in the day/evening your prescription may be e-prescribed to your pharmacy.  Please verify your pharmacy hours or chose a 24 hour pharmacy to avoid not having access to your prescription because your pharmacy has closed for the day.    If you are staying overnight following surgery, you will be transported to your hospital room following the recovery period.  [facility] has all private rooms.    If you have any questions please call Pre-Admission Testing at [phone].  Deductibles and co-payments are collected on the day of service. Please be prepared to pay the required co-pay, deductible  or deposit on the day of service as defined by your plan.    Patient Education for Self-Quarantine Process    • Following your COVID testing, we strongly recommend that you wear a mask when you are with other people and practice social distancing.   • Limit your activities to only required outings.  • Wash your hands with soap and water frequently for at least 20 seconds.   • Avoid touching your eyes, nose and mouth with unwashed hands.  • Do not share anything - utensils, drinking glasses, food from the same bowl.   • Sanitize household surfaces daily. Include all high touch areas (door handles, light switches, phones, countertops, etc.)    Call your surgeon immediately if you experience any of the following symptoms:  • Sore Throat  • Shortness of Breath or difficulty breathing  • Cough  • Chills  • Body soreness or muscle pain  • Headache  • Fever  • New loss of taste or smell  • Do not arrive for your surgery ill.  Your procedure will need to be rescheduled to another time.  You will need to call your physician before the day of surgery to avoid any unnecessary exposure to hospital staff as well as other patients.      CHLORHEXIDINE CLOTH INSTRUCTIONS  The morning of surgery follow these instructions using the Chlorhexidine cloths you've been given.  These steps reduce bacteria on the body.  Do not use the cloths near your eyes, ears mouth, genitalia or on open wounds.  Throw the cloths away after use but do not try to flush them down a toilet.      • Open and remove one cloth at a time from the package.    • Leave the cloth unfolded and begin the bathing.  • Massage the skin with the cloths using gentle pressure to remove bacteria.  Do not scrub harshly.   • Follow the steps below with one 2% CHG cloth per area (6 total cloths).  • One cloth for neck, shoulders and chest.  • One cloth for both arms, hands, fingers and underarms (do underarms last).  • One cloth for the abdomen followed by groin.  • One cloth  for right leg and foot including between the toes.  • One cloth for left leg and foot including between the toes.  • The last cloth is to be used for the back of the neck, back and buttocks.    Allow the CHG to air dry 3 minutes on the skin which will give it time to work and decrease the chance of irritation.  The skin may feel sticky until it is dry.  Do not rinse with water or any other liquid or you will lose the beneficial effects of the CHG.  If mild skin irritation occurs, do rinse the skin to remove the CHG.  Report this to the nurse at time of admission.  Do not apply lotions, creams, ointments, deodorants or perfumes after using the clothes. Dress in clean clothes before coming to the hospital.

## 2021-11-03 ENCOUNTER — ANESTHESIA (OUTPATIENT)
Dept: PERIOP | Facility: HOSPITAL | Age: 61
End: 2021-11-03

## 2021-11-03 ENCOUNTER — HOSPITAL ENCOUNTER (OUTPATIENT)
Facility: HOSPITAL | Age: 61
Setting detail: HOSPITAL OUTPATIENT SURGERY
Discharge: HOME | End: 2021-11-03
Attending: SURGERY | Admitting: SURGERY

## 2021-11-03 ENCOUNTER — APPOINTMENT (OUTPATIENT)
Dept: ONCOLOGY | Facility: HOSPITAL | Age: 61
End: 2021-11-03

## 2021-11-03 ENCOUNTER — ANESTHESIA EVENT (OUTPATIENT)
Dept: PERIOP | Facility: HOSPITAL | Age: 61
End: 2021-11-03

## 2021-11-03 ENCOUNTER — APPOINTMENT (OUTPATIENT)
Dept: LAB | Facility: HOSPITAL | Age: 61
End: 2021-11-03

## 2021-11-03 VITALS
TEMPERATURE: 97.6 F | DIASTOLIC BLOOD PRESSURE: 74 MMHG | OXYGEN SATURATION: 93 % | SYSTOLIC BLOOD PRESSURE: 144 MMHG | HEART RATE: 72 BPM | RESPIRATION RATE: 18 BRPM

## 2021-11-03 DIAGNOSIS — Z99.2 ESRD (END STAGE RENAL DISEASE) ON DIALYSIS (HCC): Primary | ICD-10-CM

## 2021-11-03 DIAGNOSIS — D50.9 IRON DEFICIENCY ANEMIA, UNSPECIFIED IRON DEFICIENCY ANEMIA TYPE: ICD-10-CM

## 2021-11-03 DIAGNOSIS — N18.30 STAGE 3 CHRONIC RENAL IMPAIRMENT ASSOCIATED WITH TYPE 2 DIABETES MELLITUS (HCC): ICD-10-CM

## 2021-11-03 DIAGNOSIS — E11.22 STAGE 3 CHRONIC RENAL IMPAIRMENT ASSOCIATED WITH TYPE 2 DIABETES MELLITUS (HCC): ICD-10-CM

## 2021-11-03 DIAGNOSIS — N18.30 ANEMIA DUE TO STAGE 3 CHRONIC KIDNEY DISEASE TREATED WITH ERYTHROPOIETIN (HCC): ICD-10-CM

## 2021-11-03 DIAGNOSIS — N18.6 ESRD (END STAGE RENAL DISEASE) ON DIALYSIS (HCC): Primary | ICD-10-CM

## 2021-11-03 DIAGNOSIS — D63.1 ANEMIA DUE TO STAGE 3 CHRONIC KIDNEY DISEASE TREATED WITH ERYTHROPOIETIN (HCC): ICD-10-CM

## 2021-11-03 LAB
ABO GROUP BLD: NORMAL
ALBUMIN SERPL-MCNC: 3.5 G/DL (ref 3.5–5.2)
ALBUMIN/GLOB SERPL: 0.8 G/DL
ALP SERPL-CCNC: 96 U/L (ref 39–117)
ALT SERPL W P-5'-P-CCNC: 13 U/L (ref 1–33)
ANION GAP SERPL CALCULATED.3IONS-SCNC: 10.2 MMOL/L (ref 5–15)
AST SERPL-CCNC: 14 U/L (ref 1–32)
BILIRUB SERPL-MCNC: 0.6 MG/DL (ref 0–1.2)
BLD GP AB SCN SERPL QL: NEGATIVE
BUN SERPL-MCNC: 17 MG/DL (ref 8–23)
BUN/CREAT SERPL: 5.2 (ref 7–25)
CALCIUM SPEC-SCNC: 8.4 MG/DL (ref 8.6–10.5)
CHLORIDE SERPL-SCNC: 100 MMOL/L (ref 98–107)
CO2 SERPL-SCNC: 26.8 MMOL/L (ref 22–29)
CREAT SERPL-MCNC: 3.27 MG/DL (ref 0.57–1)
DEPRECATED RDW RBC AUTO: 58.9 FL (ref 37–54)
ERYTHROCYTE [DISTWIDTH] IN BLOOD BY AUTOMATED COUNT: 16.4 % (ref 12.3–15.4)
GFR SERPL CREATININE-BSD FRML MDRD: 14 ML/MIN/1.73
GFR SERPL CREATININE-BSD FRML MDRD: ABNORMAL ML/MIN/{1.73_M2}
GLOBULIN UR ELPH-MCNC: 4.2 GM/DL
GLUCOSE BLDC GLUCOMTR-MCNC: 120 MG/DL (ref 70–130)
GLUCOSE BLDC GLUCOMTR-MCNC: 122 MG/DL (ref 70–130)
GLUCOSE SERPL-MCNC: 116 MG/DL (ref 65–99)
HCT VFR BLD AUTO: 26.9 % (ref 34–46.6)
HGB BLD-MCNC: 8.5 G/DL (ref 12–15.9)
INR PPP: 1.14 (ref 0.9–1.1)
MCH RBC QN AUTO: 31.3 PG (ref 26.6–33)
MCHC RBC AUTO-ENTMCNC: 31.6 G/DL (ref 31.5–35.7)
MCV RBC AUTO: 98.9 FL (ref 79–97)
PLATELET # BLD AUTO: 228 10*3/MM3 (ref 140–450)
PMV BLD AUTO: 9.4 FL (ref 6–12)
POTASSIUM SERPL-SCNC: 3.6 MMOL/L (ref 3.5–5.2)
PROT SERPL-MCNC: 7.7 G/DL (ref 6–8.5)
PROTHROMBIN TIME: 14.4 SECONDS (ref 11.7–14.2)
RBC # BLD AUTO: 2.72 10*6/MM3 (ref 3.77–5.28)
RH BLD: POSITIVE
SODIUM SERPL-SCNC: 137 MMOL/L (ref 136–145)
T&S EXPIRATION DATE: NORMAL
WBC # BLD AUTO: 3.61 10*3/MM3 (ref 3.4–10.8)

## 2021-11-03 PROCEDURE — 85027 COMPLETE CBC AUTOMATED: CPT | Performed by: SURGERY

## 2021-11-03 PROCEDURE — 86900 BLOOD TYPING SEROLOGIC ABO: CPT | Performed by: INTERNAL MEDICINE

## 2021-11-03 PROCEDURE — 25010000002 MIDAZOLAM PER 1 MG: Performed by: ANESTHESIOLOGY

## 2021-11-03 PROCEDURE — 25010000002 FENTANYL CITRATE (PF) 50 MCG/ML SOLUTION: Performed by: ANESTHESIOLOGY

## 2021-11-03 PROCEDURE — 25010000002 PROPOFOL 10 MG/ML EMULSION: Performed by: NURSE ANESTHETIST, CERTIFIED REGISTERED

## 2021-11-03 PROCEDURE — 80053 COMPREHEN METABOLIC PANEL: CPT | Performed by: SURGERY

## 2021-11-03 PROCEDURE — 25010000002 PROTAMINE SULFATE PER 10 MG: Performed by: NURSE ANESTHETIST, CERTIFIED REGISTERED

## 2021-11-03 PROCEDURE — 25010000002 HEPARIN (PORCINE) PER 1000 UNITS: Performed by: SURGERY

## 2021-11-03 PROCEDURE — C1889 IMPLANT/INSERT DEVICE, NOC: HCPCS | Performed by: SURGERY

## 2021-11-03 PROCEDURE — 25010000002 CEFAZOLIN PER 500 MG: Performed by: SURGERY

## 2021-11-03 PROCEDURE — 76942 ECHO GUIDE FOR BIOPSY: CPT | Performed by: SURGERY

## 2021-11-03 PROCEDURE — 85610 PROTHROMBIN TIME: CPT | Performed by: SURGERY

## 2021-11-03 PROCEDURE — 25010000002 MIDAZOLAM PER 1 MG: Performed by: NURSE ANESTHETIST, CERTIFIED REGISTERED

## 2021-11-03 PROCEDURE — 86901 BLOOD TYPING SEROLOGIC RH(D): CPT | Performed by: INTERNAL MEDICINE

## 2021-11-03 PROCEDURE — 25010000002 HEPARIN (PORCINE) PER 1000 UNITS: Performed by: NURSE ANESTHETIST, CERTIFIED REGISTERED

## 2021-11-03 PROCEDURE — 82962 GLUCOSE BLOOD TEST: CPT

## 2021-11-03 PROCEDURE — 86850 RBC ANTIBODY SCREEN: CPT | Performed by: INTERNAL MEDICINE

## 2021-11-03 PROCEDURE — 0 MEPIVACAINE HCL (PF) 1.5 % SOLUTION: Performed by: ANESTHESIOLOGY

## 2021-11-03 PROCEDURE — 0 CEFAZOLIN PER 500 MG: Performed by: SURGERY

## 2021-11-03 DEVICE — LIGACLIP MCA MULTIPLE CLIP APPLIERS, 20 MEDIUM CLIPS
Type: IMPLANTABLE DEVICE | Site: ARM | Status: FUNCTIONAL
Brand: LIGACLIP

## 2021-11-03 DEVICE — LIGACLIP MCA MULTIPLE CLIP APPLIERS, 20 SMALL CLIPS
Type: IMPLANTABLE DEVICE | Site: ARM | Status: FUNCTIONAL
Brand: LIGACLIP

## 2021-11-03 RX ORDER — FENTANYL CITRATE 50 UG/ML
INJECTION, SOLUTION INTRAMUSCULAR; INTRAVENOUS
Status: COMPLETED | OUTPATIENT
Start: 2021-11-03 | End: 2021-11-03

## 2021-11-03 RX ORDER — NALOXONE HCL 0.4 MG/ML
0.2 VIAL (ML) INJECTION AS NEEDED
Status: DISCONTINUED | OUTPATIENT
Start: 2021-11-03 | End: 2021-11-03 | Stop reason: HOSPADM

## 2021-11-03 RX ORDER — DIPHENHYDRAMINE HYDROCHLORIDE 50 MG/ML
12.5 INJECTION INTRAMUSCULAR; INTRAVENOUS
Status: DISCONTINUED | OUTPATIENT
Start: 2021-11-03 | End: 2021-11-03 | Stop reason: HOSPADM

## 2021-11-03 RX ORDER — MIDAZOLAM HYDROCHLORIDE 1 MG/ML
1 INJECTION INTRAMUSCULAR; INTRAVENOUS
Status: DISCONTINUED | OUTPATIENT
Start: 2021-11-03 | End: 2021-11-03 | Stop reason: HOSPADM

## 2021-11-03 RX ORDER — HYDROMORPHONE HYDROCHLORIDE 1 MG/ML
0.25 INJECTION, SOLUTION INTRAMUSCULAR; INTRAVENOUS; SUBCUTANEOUS
Status: DISCONTINUED | OUTPATIENT
Start: 2021-11-03 | End: 2021-11-03 | Stop reason: HOSPADM

## 2021-11-03 RX ORDER — MIDAZOLAM HYDROCHLORIDE 1 MG/ML
INJECTION INTRAMUSCULAR; INTRAVENOUS
Status: COMPLETED | OUTPATIENT
Start: 2021-11-03 | End: 2021-11-03

## 2021-11-03 RX ORDER — HYDROCODONE BITARTRATE AND ACETAMINOPHEN 5; 325 MG/1; MG/1
1 TABLET ORAL ONCE AS NEEDED
Status: DISCONTINUED | OUTPATIENT
Start: 2021-11-03 | End: 2021-11-03 | Stop reason: HOSPADM

## 2021-11-03 RX ORDER — PROTAMINE SULFATE 10 MG/ML
INJECTION, SOLUTION INTRAVENOUS AS NEEDED
Status: DISCONTINUED | OUTPATIENT
Start: 2021-11-03 | End: 2021-11-03 | Stop reason: SURG

## 2021-11-03 RX ORDER — FENTANYL CITRATE 50 UG/ML
25 INJECTION, SOLUTION INTRAMUSCULAR; INTRAVENOUS
Status: DISCONTINUED | OUTPATIENT
Start: 2021-11-03 | End: 2021-11-03 | Stop reason: HOSPADM

## 2021-11-03 RX ORDER — FENTANYL CITRATE 50 UG/ML
50 INJECTION, SOLUTION INTRAMUSCULAR; INTRAVENOUS
Status: DISCONTINUED | OUTPATIENT
Start: 2021-11-03 | End: 2021-11-03 | Stop reason: HOSPADM

## 2021-11-03 RX ORDER — SODIUM CHLORIDE 0.9 % (FLUSH) 0.9 %
3-10 SYRINGE (ML) INJECTION AS NEEDED
Status: DISCONTINUED | OUTPATIENT
Start: 2021-11-03 | End: 2021-11-03 | Stop reason: HOSPADM

## 2021-11-03 RX ORDER — HEPARIN SODIUM 1000 [USP'U]/ML
INJECTION, SOLUTION INTRAVENOUS; SUBCUTANEOUS AS NEEDED
Status: DISCONTINUED | OUTPATIENT
Start: 2021-11-03 | End: 2021-11-03 | Stop reason: SURG

## 2021-11-03 RX ORDER — PROMETHAZINE HYDROCHLORIDE 12.5 MG/1
25 TABLET ORAL ONCE AS NEEDED
Status: DISCONTINUED | OUTPATIENT
Start: 2021-11-03 | End: 2021-11-03 | Stop reason: HOSPADM

## 2021-11-03 RX ORDER — HYDROCODONE BITARTRATE AND ACETAMINOPHEN 5; 325 MG/1; MG/1
2 TABLET ORAL EVERY 6 HOURS PRN
Qty: 10 TABLET | Refills: 0 | Status: SHIPPED | OUTPATIENT
Start: 2021-11-03 | End: 2021-11-26

## 2021-11-03 RX ORDER — SODIUM CHLORIDE 0.9 % (FLUSH) 0.9 %
3 SYRINGE (ML) INJECTION EVERY 12 HOURS SCHEDULED
Status: DISCONTINUED | OUTPATIENT
Start: 2021-11-03 | End: 2021-11-03 | Stop reason: HOSPADM

## 2021-11-03 RX ORDER — EPHEDRINE SULFATE 50 MG/ML
5 INJECTION, SOLUTION INTRAVENOUS ONCE AS NEEDED
Status: DISCONTINUED | OUTPATIENT
Start: 2021-11-03 | End: 2021-11-03 | Stop reason: HOSPADM

## 2021-11-03 RX ORDER — LABETALOL HYDROCHLORIDE 5 MG/ML
5 INJECTION, SOLUTION INTRAVENOUS
Status: DISCONTINUED | OUTPATIENT
Start: 2021-11-03 | End: 2021-11-03 | Stop reason: HOSPADM

## 2021-11-03 RX ORDER — MIDAZOLAM HYDROCHLORIDE 1 MG/ML
INJECTION INTRAMUSCULAR; INTRAVENOUS AS NEEDED
Status: DISCONTINUED | OUTPATIENT
Start: 2021-11-03 | End: 2021-11-03 | Stop reason: SURG

## 2021-11-03 RX ORDER — PROPOFOL 10 MG/ML
VIAL (ML) INTRAVENOUS CONTINUOUS PRN
Status: DISCONTINUED | OUTPATIENT
Start: 2021-11-03 | End: 2021-11-03 | Stop reason: SURG

## 2021-11-03 RX ORDER — ONDANSETRON 2 MG/ML
4 INJECTION INTRAMUSCULAR; INTRAVENOUS ONCE AS NEEDED
Status: DISCONTINUED | OUTPATIENT
Start: 2021-11-03 | End: 2021-11-03 | Stop reason: HOSPADM

## 2021-11-03 RX ORDER — FLUMAZENIL 0.1 MG/ML
0.2 INJECTION INTRAVENOUS AS NEEDED
Status: DISCONTINUED | OUTPATIENT
Start: 2021-11-03 | End: 2021-11-03 | Stop reason: HOSPADM

## 2021-11-03 RX ORDER — HYDRALAZINE HYDROCHLORIDE 20 MG/ML
5 INJECTION INTRAMUSCULAR; INTRAVENOUS
Status: DISCONTINUED | OUTPATIENT
Start: 2021-11-03 | End: 2021-11-03 | Stop reason: HOSPADM

## 2021-11-03 RX ORDER — BUPIVACAINE HYDROCHLORIDE 2.5 MG/ML
INJECTION, SOLUTION EPIDURAL; INFILTRATION; INTRACAUDAL
Status: COMPLETED | OUTPATIENT
Start: 2021-11-03 | End: 2021-11-03

## 2021-11-03 RX ORDER — CEFAZOLIN SODIUM IN 0.9 % NACL 3 G/100 ML
3 INTRAVENOUS SOLUTION, PIGGYBACK (ML) INTRAVENOUS ONCE
Status: COMPLETED | OUTPATIENT
Start: 2021-11-03 | End: 2021-11-03

## 2021-11-03 RX ORDER — LIDOCAINE HYDROCHLORIDE 10 MG/ML
0.5 INJECTION, SOLUTION EPIDURAL; INFILTRATION; INTRACAUDAL; PERINEURAL ONCE AS NEEDED
Status: DISCONTINUED | OUTPATIENT
Start: 2021-11-03 | End: 2021-11-03 | Stop reason: HOSPADM

## 2021-11-03 RX ORDER — SODIUM CHLORIDE, SODIUM LACTATE, POTASSIUM CHLORIDE, CALCIUM CHLORIDE 600; 310; 30; 20 MG/100ML; MG/100ML; MG/100ML; MG/100ML
9 INJECTION, SOLUTION INTRAVENOUS CONTINUOUS
Status: DISCONTINUED | OUTPATIENT
Start: 2021-11-03 | End: 2021-11-03

## 2021-11-03 RX ORDER — PROMETHAZINE HYDROCHLORIDE 25 MG/1
25 SUPPOSITORY RECTAL ONCE AS NEEDED
Status: DISCONTINUED | OUTPATIENT
Start: 2021-11-03 | End: 2021-11-03 | Stop reason: HOSPADM

## 2021-11-03 RX ORDER — GLYCOPYRROLATE 0.2 MG/ML
INJECTION INTRAMUSCULAR; INTRAVENOUS AS NEEDED
Status: DISCONTINUED | OUTPATIENT
Start: 2021-11-03 | End: 2021-11-03 | Stop reason: SURG

## 2021-11-03 RX ORDER — SODIUM CHLORIDE 9 MG/ML
9 INJECTION, SOLUTION INTRAVENOUS CONTINUOUS
Status: DISCONTINUED | OUTPATIENT
Start: 2021-11-03 | End: 2021-11-03 | Stop reason: HOSPADM

## 2021-11-03 RX ORDER — DIPHENHYDRAMINE HCL 25 MG
25 CAPSULE ORAL
Status: DISCONTINUED | OUTPATIENT
Start: 2021-11-03 | End: 2021-11-03 | Stop reason: HOSPADM

## 2021-11-03 RX ORDER — FAMOTIDINE 10 MG/ML
20 INJECTION, SOLUTION INTRAVENOUS ONCE
Status: COMPLETED | OUTPATIENT
Start: 2021-11-03 | End: 2021-11-03

## 2021-11-03 RX ORDER — KETAMINE HYDROCHLORIDE 10 MG/ML
INJECTION INTRAMUSCULAR; INTRAVENOUS AS NEEDED
Status: DISCONTINUED | OUTPATIENT
Start: 2021-11-03 | End: 2021-11-03 | Stop reason: SURG

## 2021-11-03 RX ADMIN — KETAMINE HYDROCHLORIDE 10 MG: 10 INJECTION INTRAMUSCULAR; INTRAVENOUS at 11:27

## 2021-11-03 RX ADMIN — FENTANYL CITRATE 50 MCG: 0.05 INJECTION, SOLUTION INTRAMUSCULAR; INTRAVENOUS at 10:10

## 2021-11-03 RX ADMIN — MIDAZOLAM 2 MG: 1 INJECTION INTRAMUSCULAR; INTRAVENOUS at 10:12

## 2021-11-03 RX ADMIN — GLYCOPYRROLATE 0.2 MG: 0.2 INJECTION INTRAMUSCULAR; INTRAVENOUS at 11:23

## 2021-11-03 RX ADMIN — CEFAZOLIN SODIUM 3 G: 10 INJECTION, POWDER, FOR SOLUTION INTRAVENOUS at 10:51

## 2021-11-03 RX ADMIN — MIDAZOLAM 2 MG: 1 INJECTION INTRAMUSCULAR; INTRAVENOUS at 11:01

## 2021-11-03 RX ADMIN — KETAMINE HYDROCHLORIDE 10 MG: 10 INJECTION INTRAMUSCULAR; INTRAVENOUS at 11:09

## 2021-11-03 RX ADMIN — SODIUM CHLORIDE 9 ML/HR: 9 INJECTION, SOLUTION INTRAVENOUS at 10:48

## 2021-11-03 RX ADMIN — HEPARIN SODIUM 5000 UNITS: 1000 INJECTION INTRAVENOUS; SUBCUTANEOUS at 11:53

## 2021-11-03 RX ADMIN — PROTAMINE SULFATE 30 MG: 10 INJECTION, SOLUTION INTRAVENOUS at 12:30

## 2021-11-03 RX ADMIN — BUPIVACAINE HYDROCHLORIDE 30 ML: 2.5 INJECTION, SOLUTION EPIDURAL; INFILTRATION; INTRACAUDAL; PERINEURAL at 10:15

## 2021-11-03 RX ADMIN — KETAMINE HYDROCHLORIDE 10 MG: 10 INJECTION INTRAMUSCULAR; INTRAVENOUS at 11:41

## 2021-11-03 RX ADMIN — MEPIVACAINE HYDROCHLORIDE 10 ML: 15 INJECTION, SOLUTION EPIDURAL; INFILTRATION at 10:15

## 2021-11-03 RX ADMIN — FAMOTIDINE 20 MG: 10 INJECTION INTRAVENOUS at 10:10

## 2021-11-03 RX ADMIN — PROPOFOL 100 MCG/KG/MIN: 10 INJECTION, EMULSION INTRAVENOUS at 11:09

## 2021-11-03 NOTE — ANESTHESIA PROCEDURE NOTES
Peripheral Block    Pre-sedation assessment completed: 11/3/2021 10:15 AM    Patient reassessed immediately prior to procedure    Patient location during procedure: holding area  Start time: 11/3/2021 10:15 AM  Stop time: 11/3/2021 10:33 AM  Reason for block: at surgeon's request and post-op pain management  Performed by  Anesthesiologist: Luis Arguelles MD  Preanesthetic Checklist  Completed: patient identified, IV checked, site marked, risks and benefits discussed, surgical consent, monitors and equipment checked, pre-op evaluation and timeout performed  Prep:  Sterile barriers:cap, gloves, gown, mask and sterile barriers  Prep: ChloraPrep  Patient monitoring: blood pressure monitoring, continuous pulse oximetry and EKG  Procedure    Sedation: yes  Performed under: local infiltration  Guidance:ultrasound guided    ULTRASOUND INTERPRETATION.  Using ultrasound guidance a 21 G gauge needle was placed in close proximity to the brachial plexus nerve, at which point, under ultrasound guidance anesthetic was injected in the area of the nerve and spread of the anesthesia was seen on ultrasound in close proximity thereto.  There were no abnormalities seen on ultrasound; a digital image was taken; and the patient tolerated the procedure with no complications. Images:still images obtained    Laterality:left  Block Type:interscalene  Injection Technique:single-shot  Needle Type:echogenic  Needle Gauge:21 G      Medications Used: bupivacaine PF (MARCAINE) 0.25 % injection, 30 mL  Mepivacaine HCl (PF) (CARBOCAINE) 1.5 % injection, 10 mL      Medications  Comment:Ultrasound Interpretation: Using ultrasound guidance, the needle was placed in close proximity to the target nerve and anesthetic was injected in the area of the target nerve and/or bundles, and spread of the anesthetic was seen on ultrasound in close proximity thereto.  There were no abnormalities seen on ultrasound; a digital / physical image was taken; and  the patient tolerated the procedure with no complications.   Block placed for postoperative pain control per surgeon request.    Post Assessment  Injection Assessment: negative aspiration for heme, no paresthesia on injection and incremental injection  Patient Tolerance:comfortable throughout block  Complications:no

## 2021-11-03 NOTE — DISCHARGE INSTRUCTIONS
[facility]  Jd Boyd, Donna Knapp Scherrer ,Vitor, Nicole  [address], [address]  [phone]    Post-Operative Instructions for AV Fistula / Graft   Diet: Regular Diet    Medications: Take your regularly scheduled medications on the day of your surgery, unless your doctor has directed you otherwise. You may be sent home with a prescription for pain medication, follow the directions as prescribed.    Activity Restrictions / Driving: Avoid lifting more than 15 pounds or other activities that stress or compress the access area. No driving for the remainder of the day after surgery. You may drive when you no longer are taking narcotic pain medications. If a nerve block was done to numb your arm for surgery, you will be placed in an arm sling.  This numbness and inability to move the arm can last for as little as 6 hours but as many as 18.  The sling should be used during this time but can be removed when sensation and movement of your arm is normal and does not need to be used after that. Use of the arm is encouraged after the surgery.    Incision Care: Some bruising is normal. If you have drainage from the incision please notify the office. Dressing should be removed in 48 hours. After dressing is removed, it is OK to shower. Do not submerge incision until cleared by your surgeon (bath or swimming).    Bathing and Showering: You may shower after you remove your dressing.    Follow-up Appointments: You will need to return to the office for a follow-up visit within 1-3 weeks after your surgery. Please make sure you have your appointment scheduled, call [phone].    The patient (you) should:  1. Avoid wearing tight constrictive clothing over that arm.  2. Avoid wearing jewelry that is tight, such as a watch on the access arm.  3. Avoid carrying heavy objects.  4. Avoid purse straps over the fistula.  5. Avoid sleeping on the arm or keeping it bent for extended periods of time.  6.  "Each day, using your opposite hand, feel over the fistula for the \"thrill\" or vibration that is normally present.    Fistula Information / Care:  ·  It is normal to have swelling in the surgical area. To help control this swelling, you should elevate your arm on a pillow.  ·  Wiggle your fingers and clinch your fist 10 times every hour, while awake, for the first 5-7 days. Also, bend and straighten at the elbow to regain normal range of motion. These exercises are designed to promote circulation in the fingers and aid in draining away the excess fluid accumulation in the immediate area.  · No blood pressures or needle sticks in the arm with your access.    Call the office for the followin. Fever greater than 101.0  2. Uncontrolled pain. This is on a scale of 1-10 (10 being the worst pain imaginable) your pain is a level 7 or above.  3. It is important that you notify our office if you are having numbness and significant pain in the extremity in which you have just had surgery!  4. Decreased or absent thrill.  5. Nausea, diarrhea, and/or vomiting that continue for 12-24 hours.  6. Signs of an infection: redness, increased swelling, drainage, fever and/or chills.  7. Chest pain or difficulty breathing.    The fistula or graft CAN NOT be used until the MD has given written approval. Generally, a graft will be ready to use in 2 weeks, and a fistula will be ready to use in 6-8 weeks.     If you have further questions after reading this handout, the office is open from 8:30am to 5:00pm Monday through Friday. Call [phone].        What to expect after a Nerve Block    Nerve blocks administered to block pain affect many types of nerves, including those nerves that control movement, pain, and normal sensation. Following a nerve block, you may notice some bruising at the site where the block was given. You may experience sensations such as: numbness of the affected area or limb, tingling, heaviness (that is the " limb feels heavy to you), weakness or inability to move the affected arm or leg, or a feeling as if your arm or leg has “fallen asleep.”     A nerve block can last from 2 to 36 hours depending on the medications used.  Usually the weakness wears off first followed by the tingling and heaviness. As the block wears off, you may begin to notice pain; however, this sequence of events may occur in any order. Typically, you will be able to move your limb before you will feel it. Once a nerve block begins to wear off, the effects are usually completely gone within 60 minutes.  If you experience continued side effects that you believe are block related for longer than 48 hours, please call your healthcare provider. Please see block-specific instructions below.    Instructions for any block involving the shoulder or arm  • If you have had any kind of shoulder/arm block, you will go home with your arm in a sling. Wear the sling until the block has completely worn off. You may be required to wear it for a longer period of time per your surgeon’s recommendations.  • If you have had a shoulder/arm block, it is a good idea to sleep on a recliner with pillows under your arm.    You may experience symptoms such as:  Shortness of breath  Hoarseness   Blurry vision  Unequal pupils  Drooping of your face on the same side as the block was performed    These are side effects associated with this kind of block and should go away within 12 hours.    Note: If you have severe or prolonged shortness of breath, please seek medical assistance as soon as possible.     Protection of a “blocked” arm or leg (limb)  • After a nerve block, you cannot feel pain, pressure, or extremes of temperature in the affected limb. And because of this, your blocked limb is at more risk for injury. For example, it is possible to burn your limb on an extremely hot surface without feeling it.     • When resting, it is important to reposition your limb periodically  to avoid prolonged pressure on it. This may require the use of pillows and padding.    • While sleeping, you should avoid rolling onto the affected limb or putting too much pressure on it.     • If you have a cast or tight dressing, check the color of your fingers or toes of the affected limb. Call your surgeon if they look discolored (that is, dusky, dark colored).    • Use caution in cold weather. Cover your limb appropriately to protect it from the cold.      Pain Management:    Your surgeon will give you a prescription for pain medication. Begin taking this before the nerve block wears off. Bear in mind that sometimes the block can wear off in the middle of the night.

## 2021-11-03 NOTE — ANESTHESIA PREPROCEDURE EVALUATION
Anesthesia Evaluation     Patient summary reviewed and Nursing notes reviewed   no history of anesthetic complications:  NPO Solid Status: > 8 hours  NPO Liquid Status: > 2 hours           Airway   Mallampati: III  TM distance: >3 FB  Neck ROM: full  Possible difficult intubation and Large neck circumference  Dental - normal exam     Pulmonary - normal exam   (+) sleep apnea on CPAP,   (-) COPD, asthma, not a smoker, lung cancer  Cardiovascular - normal exam  Exercise tolerance: poor (<4 METS)    ECG reviewed  Rhythm: regular  Rate: normal    (+) hypertension well controlled 2 medications or greater, valvular problems/murmurs murmur, CHF Diastolic >=55%, hyperlipidemia,   (-) past MI, CAD, dysrhythmias, angina, cardiac stents    ROS comment: TTE 07/2021:  Calculated left ventricular EF = 55.8% Estimated left ventricular EF = 56% Estimated left ventricular EF was in agreement with the calculated left ventricular EF. Left ventricular systolic function is normal. Normal global longitudinal LV strain (GLS) = -19.5%. Left ventricle strain data was reviewed by the physician and found to be accurate. Normal left ventricular wall thickness noted. The left ventricular cavity is borderline dilated. All left ventricular wall segments contract normally. Left ventricular diastolic function is consistent with (grade II w/high LAP) pseudonormalization.    Neuro/Psych  (+) tremors, numbness, psychiatric history Anxiety and Depression,     (-) seizures, TIA, CVA  GI/Hepatic/Renal/Endo    (+) morbid obesity,  renal disease stones, ESRD and dialysis, diabetes mellitus type 2 well controlled using insulin, thyroid problem hypothyroidism  (-) hiatal hernia, GERD, PUD, hepatitis, liver disease, GI bleed    Musculoskeletal     Abdominal   (+) obese,     Abdomen: soft.   Substance History - negative use     OB/GYN negative ob/gyn ROS         Other   arthritis,    history of cancer      Other Comment: Hx/o metastatic breast CA                 Anesthesia Plan    ASA 4     MAC and regional   (ISB or SCB w/ sedation    I have reviewed the patient's history with the patient and the chart, including all pertinent laboratory results and imaging. I have explained the risks of anesthesia including but not limited to dental damage, corneal abrasion, nerve injury, MI, stroke, and death. Questions asked and answered. Anesthetic plan discussed with patient and team as indicated. Patient expressed understanding of the above.  )  intravenous induction     Anesthetic plan, all risks, benefits, and alternatives have been provided, discussed and informed consent has been obtained with: patient.    Plan discussed with CRNA and attending.

## 2021-11-03 NOTE — OP NOTE
Operative Note  Location: [facility]     Pre-op Diagnosis: End Stage Renal Disease    Post-op Diagnosis: Same    Procedure(s):  LEFT  BRACHIOCEPALIC ARTERIOVENOUS FISTULA    Surgeon(s):  Dejuan Adler MD    Assistant: Karla Vides CST    Anesthesia: Monitored Anesthesia Care with Regional    Estimated Blood Loss: minimal     Staff:   Circulator: Ayse Garg RN; Aurora Ruiz RN  Scrub Person: Arielle Garcia  Assistant: Karla Vides    Complications: None    Specimen: None    Findings:   This was a difficult fistula due to the patient's BMI of nearly 60.  Under MAC, she obstructed and was coughing a lot.  General anesthesia may need to be considered for future surgeries if required.  Her artery is relatively small.  I used a deep branch for the anastomosis and had to ligate the basilic at its origin as well as the forearm cephalic in order to mobilize enough to get down to the artery.  Nice radial signal    Indications:  The patient is an 61 y.o. female referred for evaluation for AV fistula placement.  The patient has End Stage Renal Disease .  After evaluation in the office and ultrasound vein mapping of the upper extremities the patient was determined to be a candidate for brachiocephalic arteriovenous fistula.  The risks, benefits, and alternatives were discussed with the patient who agreed to proceed.  This includes but is not limited to nerve injury, vascular compromise, infection, and failure to mature.    Procedure:  The patient was taken to Operating Room and identified as Juana Hall and the procedure verified as left brachial cephalic AVF. A Time Out was held and the above information confirmed.    In the operating room with the patient sedated the arm was mapped under ultrasound again to help determine the best location for incision.  A decision was made to make the incision at the antecubital fossa.  Skin and subcutaneous tissues were anesthetized.  The skin and subcutaneous  was divided sharply.  The cephalic vein was identified and dissected out circumferentially for a significant segment.  It was marked.  The bicipital aponeurosis was exposed.  Local anesthesia was administered below this and then the aponeurosis was divided sharply.  The brachial artery was exposed circumferentially.  Enough of the cephalic vein was exposed so that it could be ligated distally with a silk ligature and then transposed over to the brachial artery.  The vein is spatulated in order to fit the arterial anastomosis.  It was marked to reduce the risk of twisting.  The brachial artery was opened up with 11 blade scalpel and small Davis scissors.  A running anastomosis is done with a 6-0 Prolene suture.  It was flushed and de-aired prior to completion.  There was a good thrill to completion of the case.  There was a good Doppler signal at the wrist as well.  The wound was copious irrigated with an antibiotic irrigation and closed in 2 layers with Vicryl sutures.  Dermabond glue was used for the skin.  Patient tolerated it well and no intraoperative complications were immediately apparent.        There are no hospital problems to display for this patient.     Dejuan Adler MD     Date: 11/3/2021  Time: 12:27 EDT

## 2021-11-03 NOTE — ANESTHESIA POSTPROCEDURE EVALUATION
Patient: Juana Hall    Procedure Summary     Date: 11/03/21 Room / Location: [facility] OR  / [facility] MAIN OR    Anesthesia Start: 1055 Anesthesia Stop: 1305    Procedure: LEFT  BRACHIOCEPALIC ARTERIOVENOUS FISTULA (Left ) Diagnosis:     Surgeons: Dejuan Adler MD Provider: Noe Cowart MD    Anesthesia Type: MAC, regional ASA Status: 4          Anesthesia Type: MAC, regional    Vitals  Vitals Value Taken Time   /74 11/03/21 1350   Temp 36.4 °C (97.6 °F) 11/03/21 1333   Pulse 72 11/03/21 1350   Resp 18 11/03/21 1350   SpO2 93 % 11/03/21 1350           Post Anesthesia Care and Evaluation    Patient location during evaluation: bedside  Patient participation: complete - patient participated  Level of consciousness: awake and alert  Pain management: adequate  Airway patency: patent  Anesthetic complications: No anesthetic complications    Cardiovascular status: acceptable  Respiratory status: acceptable  Hydration status: acceptable    Comments: /74   Pulse 72   Temp 36.4 °C (97.6 °F) (Oral)   Resp 18   LMP  (LMP Unknown)   SpO2 93%

## 2021-11-04 ENCOUNTER — HOSPITAL ENCOUNTER (OUTPATIENT)
Dept: PET IMAGING | Facility: HOSPITAL | Age: 61
Discharge: HOME | End: 2021-11-04
Admitting: INTERNAL MEDICINE

## 2021-11-04 ENCOUNTER — HOSPITAL ENCOUNTER (OUTPATIENT)
Dept: PET IMAGING | Facility: HOSPITAL | Age: 61
End: 2021-11-04

## 2021-11-04 DIAGNOSIS — D63.8 ANEMIA, CHRONIC DISEASE: ICD-10-CM

## 2021-11-04 DIAGNOSIS — C50.919 METASTATIC BREAST CANCER: ICD-10-CM

## 2021-11-04 LAB — GLUCOSE BLDC GLUCOMTR-MCNC: 242 MG/DL (ref 70–130)

## 2021-11-04 PROCEDURE — 82962 GLUCOSE BLOOD TEST: CPT

## 2021-11-08 ENCOUNTER — APPOINTMENT (OUTPATIENT)
Dept: CT IMAGING | Facility: HOSPITAL | Age: 61
End: 2021-11-08

## 2021-11-10 ENCOUNTER — HOSPITAL ENCOUNTER (OUTPATIENT)
Dept: PET IMAGING | Facility: HOSPITAL | Age: 61
Discharge: HOME | End: 2021-11-10

## 2021-11-10 LAB — GLUCOSE BLDC GLUCOMTR-MCNC: 93 MG/DL (ref 70–130)

## 2021-11-10 PROCEDURE — 0 FLUDEOXYGLUCOSE F18 SOLUTION: Performed by: INTERNAL MEDICINE

## 2021-11-10 PROCEDURE — A9552 F18 FDG: HCPCS | Performed by: INTERNAL MEDICINE

## 2021-11-10 PROCEDURE — 78815 PET IMAGE W/CT SKULL-THIGH: CPT

## 2021-11-10 PROCEDURE — 82962 GLUCOSE BLOOD TEST: CPT

## 2021-11-10 RX ADMIN — FLUDEOXYGLUCOSE F18 1 DOSE: 300 INJECTION INTRAVENOUS at 08:14

## 2021-11-11 NOTE — PROGRESS NOTES
"Chief Complaint  Metastatic lobular breast cancer (ER/[place] positive, HER-2/tj negative), anemia secondary to CKD3, CHF, peripheral neuropathy, chemotherapy related nausea chemotherapy-induced myelosuppression    Subjective        History of Present Illness  Patient returns today in follow-up with laboratory studies and PET scan to review continuing on Ibrance and Aromasin.  She is currently taking Ibrance on a schedule of 100 mg daily for 7 days on followed by 7 days off due to ongoing difficulty with severe anemia requiring intermittent transfusion support.  The patient continues on hemodialysis now down to 2 days/week on Tuesdays and Saturdays.  She had an AV fistula placed in her left upper extremity by vascular surgery on 11/3/2021.  She did have some bruising afterwards but no significant other issues.  She continues with significant fatigue related to dialysis and finds it difficult to do anything other than dialysis on those days.  She remains in a motorized scooter today, chronic difficulty with her mobility.  She has some chronic peripheral neuropathy involving her toes which is unchanged.  She denies any abdominal pain.  She is tearful in the office today regarding news of progressive breast cancer.       Objective   Vital Signs:   /80   Pulse 67   Temp 98.4 °F (36.9 °C) (Oral)   Resp 16   Ht 170.2 cm (67.01\")   Wt (!) 165 kg (364 lb) Comment: states  SpO2 96%   BMI 57.00 kg/m²     Physical Exam  Constitutional:       Appearance: She is well-developed. She is obese.      Comments: Patient is in a motorized scooter today   Eyes:      Conjunctiva/sclera: Conjunctivae normal.   Neck:      Thyroid: No thyromegaly.   Cardiovascular:      Rate and Rhythm: Normal rate and regular rhythm.      Heart sounds: Murmur heard.   No friction rub. No gallop.       Comments: 2/6 murmur  Pulmonary:      Effort: No respiratory distress.      Breath sounds: Normal breath sounds.      Comments: Dialysis access in " place in the right subclavian position  Chest:   Breasts:      Right: No supraclavicular adenopathy.      Left: No supraclavicular adenopathy.       Abdominal:      General: Bowel sounds are normal. There is no distension.      Palpations: Abdomen is soft.      Tenderness: There is no abdominal tenderness.   Musculoskeletal:         General: Swelling present.      Comments: Trace bilateral lower extremity edema   Lymphadenopathy:      Head:      Right side of head: No submandibular adenopathy.      Cervical: No cervical adenopathy.      Upper Body:      Right upper body: No supraclavicular adenopathy.      Left upper body: No supraclavicular adenopathy.   Skin:     General: Skin is warm and dry.      Findings: Bruising present. No rash.      Comments: There is significant bruising noted in the left forearm, healing left antecubital fossa surgical incision.   Neurological:      Mental Status: She is alert and oriented to person, place, and time.      Cranial Nerves: No cranial nerve deficit.      Motor: No abnormal muscle tone.      Deep Tendon Reflexes: Reflexes normal.   Psychiatric:         Behavior: Behavior normal.          Result Review : Reviewed CBC, CMP from today.  Reviewed PET scan from 11/10/2021.  I did personally review PET scan images with interpretation as outlined in the assessment below.  Discussed PET scan findings on the telephone today with Dr. Conde in radiology.       Assessment and Plan    1. Metastatic lobular breast cancer (ER/GA positive, HER-2/tj negative):  · History of stage IIIC right breast cancer ([id number])  · Patient treated previously in the [facility] system  · Diagnosis via excisional biopsy 8/23/2003 with lobular carcinoma, 4.5 cm, positive margins.  ER/GA positive, HER-2/tj negative.  · Staging evaluation in September 2003 with negative bone scan and negative CT scans.  Ejection fraction 65%.  · Received neoadjuvant chemotherapy (? GET regimen) which apparently included  Taxotere and possibly epirubicin.  Received 6 cycles.  · 1/22/2004 bilateral mastectomy with right axillary dissection.  Per available records, 10-12 cm residual invasive lobular carcinoma in the breast with 14/16 lymph nodes positive with extranodal extension.  Immediate reconstruction.  · Received adjuvant chemotherapy with carboplatin and navelbine x4 cycles  · Initiated adjuvant tamoxifen 6/22/2004  · Adjuvant radiation therapy initiated but interrupted due to chest wall cellulitis requiring removal of implants in August 2004  · Implant reconstruction again attempted with MRSA infection, implant removed 12/30/2005 with no further attempts made and reconstruction.  · Completed 5 years tamoxifen in June 2009 and subsequently transitioned to Femara.  Received Femara until June 2014.  · Patient experienced postmenopausal vaginal bleeding in 2013, negative endometrial biopsy and gynecologic evaluation.  · Patient last seen in [facility] 6/18/2014  · CT chest performed to evaluate bicuspid aortic valve and dilated asending aorta 7/12/2019.  CT showed no change in aorta however showed new free intraperitoneal fluid in the upper abdomen with mesenteric edema, concerning for carcinomatosis.  · CT abdomen and pelvis (without IV contrast) on 7/16/2019 with mesenteric thickening, small volume of complex fluid in the mesentery which was suspicious and possibly representative of carcinomatosis, small amount of perihepatic fluid, small bowel loops tethered to omentum without obstruction, omental thickening, shotty retroperitoneal and pelvic lymph nodes (non-pathologically enlarged).  · PET scan 7/26/2019 with hypermetabolic omental activity, hypermetabolic small retroperitoneal lymph nodes, hypermetabolic activity throughout uterus with increase in size.  · CT-guided omental biopsy 7/30/2019 with metastatic lobular carcinoma of breast primary, ER positive (greater than 95%), OK positive (90%), HER-2/tj negative (1+  IHC)  · Baseline CA 15-3 on 7/22/19 was 32.6  · Initiation of Aromasin 25 mg daily 8/12/2019.  Initiated Ibrance at 100 mg 21/28 days on 8/26/2019.  · Improvement in CA 15-3 on 9/17/19-26.3  · Following 2 cycles of Aromasin and Ibrance, CA 15-3 declined to 25.9 on 10/15/2019.  CT scan chest abdomen pelvis 10/16/2019 showed improvement in the mesenteric and omental thickening and near resolution of complex ascites.  Treatment continued.  · Stable findings on subsequent CT chest abdomen pelvis 12/11/2019.  Further improvement in CA 15-3-23.9 on 12/18/2019.  Ibrance/Aromasin continued.   · [facility] medicine liquid biopsy 2/5/2020: MSI undetermined, mutations in BETH, NF1, CHEK2.  No evidence of PIK3CA mutation.  · Slight increase in CA 15-3 to 27.1 on 2/19/2020 and 29.1 on 3/18/2020.  CT however on 3/13/2020 with no new findings.  · Subsequent improvement in CA 15-3-26.5 on 5/15/2020  · CT 7/31/2020 showed evidence of stable disease.  CA 15-3 declined further to 23.1 on 7/24/2020.  · CT 10/19/2020 with stable disease.  Fluctuations in CA 15-3 of unclear significance.  · CA 15-3 on 1/6/2021 decreased to 23.  CT chest abdomen pelvis 1/29/2021 with stable findings.  · CA 15-3 on 4/1/2021 was 28.4  · CT 4/22/2021 with no evidence of progressive malignancy, notation of new focal linear subpleural opacification right upper lobe felt to be infectious and/or inflammatory.  Enlargement of 2 staghorn calculi left kidney.  Persistent left perinephric stranding.    · Hospitalization 4/29-5/4/2021 with RYAN/CKD, UTI, anemia.  Required 2 unit PRBC transfusion and initiated hemodialysis Tuesday Thursday Saturday.  Brief interruption in Ibrance during hospitalization.  · CA 15-3 on 5/27/2021 was 27.8.  · CT chest abdomen pelvis 7/2/2021 showed no evidence of progression or new metastatic disease.  There was only one remaining left renal stone which was smaller than previous exam.  Stranding surrounding both kidneys left greater than  right with left hydronephrosis and hydroureter.  · CA 15-3 7/7/2021 was 25.3  · Patient experienced severe ongoing anemia felt to be in part related to Ibrance requiring transfusion support intermittently.  Ibrance dose decreased on 8/23/2020 1 to 100 mg daily for 7 days on followed by 7 days off.  · Stable CA 15-3 on 9/1/2021 at 25.5 and again 10/13/2021 at 25.6.  · CT chest abdomen pelvis 10/26/2021 with increase in left hydronephrosis with increase in left perinephric and periureteral stranding. Decrease in stone in the left kidney lower pole (7 mm).  Stable small retroperitoneal lymph and left periaortic lymph nodes.  · PET scan 11/10/2021 with multiple bilateral hypermetabolic nodular pulmonary lesions, asymmetric moderate to severe left hydronephrosis with ill-defined fat stranding and soft tissue thickening in the renal sinus and surrounding the left ureter, hypermetabolic left retroperitoneal lymph node with slight increase in size, ill-defined hypermetabolic thickening in the inferior omentum with increase in size, uptake and C7 (indeterminate, recommended MRI).  Short segments of uptake in the mid and distal esophagus possibly related to gastritis.  · PET scan findings felt to be consistent with progressive malignancy.  Patient declined repeat omental biopsy.   · Options for further treatment discussed on 11/12/2021.  In light of renal failure and dialysis, options are more limited.  Recommendation to continue Ibrance and discontinue Aromasin, begin Faslodex.  Discussed possible future use of single agent Taxol.  Previous liquid biopsy with BETH mutation, unclear whether germline and will obtain [facility] panel test.  If germline BETH mutation consider use of Lynparza for possible future treatment.  · Aromasin discontinued 11/12/2021  · On 11/19/2021 initiation of Faslodex with continuation of Ibrance (100 mg daily for 7 days on followed by 7 days off).  · Patient returns today in follow-up continuing on  Ibrance at 100 mg daily for 7 days on followed by 7 days off (currently on week off due to be resume on 11/1/2021) along with Aromasin 25 mg daily. In light of the findings on the patient's CT scan from 10/26/2021, with persistent and increasing left hydronephrosis with increase in left perinephric and periureteral stranding there was significant concern regarding progressive malignancy in this area. Therefore patient returns with PET scan to review today. PET scan as noted above on 11/10/2021 shows new hypermetabolic lung lesions, hypermetabolic activity in the area of left hydronephrosis with fat stranding and soft tissue thickening around the left renal sinus and left ureter as well as increase in hypermetabolic nodule in the omentum and in hypermetabolic lymph node in the left retroperitoneum. There was a question regarding uptake at C7 however upon discussion with Dr. Conde, he feels that this is indeterminate and that we should proceed with MRI to further evaluate C7. I discussed with the patient today whether to proceed with repeat biopsy of her omentum to see if there has been any change in the breast cancer present. It does however appear to be continuing to behave in an indolent fashion with no risks of behavior. Patient does not wish to proceed with a biopsy. We discussed further treatment options and we do have limitations in regards mainly to her renal failure on dialysis. We discussed the option for additional endocrine therapy by discontinuing Aromasin and switching to Faslodex with continuation of Ibrance at her current dose. We discussed the possibility of transitioning to IV chemotherapy with Taxol however I am not sure whether her hormone receptor positive disease will respond well to chemotherapy and there would be significant increased risk for side effects and complications with chemotherapy. This does remain a future treatment option. We also reviewed her previous liquid biopsy indicating  BETH mutation and is unclear whether this is a germline mutation. If so this may indicate some potential to treat her with olaparib. Therefore today we are going to send off [facility] panel test. Olaparib however would be a future treatment option and we would have to discuss dosing in the setting of renal failure. Patient agreed that for now we should attempt Faslodex and Ibrance. She will go ahead and stop Aromasin now and return next week to begin Faslodex and continue on her Ibrance at the current dose (100 mg daily 7 days on followed by 7 days off). She will have nurse practitioner visit next week when she begins Faslodex. I will see her in 3 weeks when she is due for cycle 1 day 15 Faslodex. In the meantime we will check MRI cervical spine to evaluate C7.  2. Anemia secondary to ESRD, exacerbated by Ibrance:  · The patient appears to have a chronic anemia with hemoglobin in the 10-11 range with normal MCV.  · Patient has underlying CKD3 with baseline creatinine recently in the 1.5-1.8 range.  · Anemia evaluation 7/22/2019 with iron 30, ferritin 85.2, iron saturation 10%, TIBC 311, B12 370, erythropoietin level 20.4  · Anemia felt in large part to be related to CKD3 as well as to underlying malignancy  · Evidence of folate deficiency 8/12/2019 with level 3.84, initiated folic acid 1 mg daily  · Additional labs on 12/18/2019 with iron 73, ferritin 82.2, iron saturation 25%, TIBC 290.  We did discuss the potential use of Procrit if her hemoglobin declines into the low 9/upper 8 range.  · Decline in hemoglobin into the 8 range, iron studies 7/20/2020 with iron 54, ferritin 66.7, iron saturation 16%, TIBC 328.  On 8/7/2020 proceeded with Injectafer 750 mg IV x1 dose.  Second dose not administered due to onset of fever, subsequent iron studies precluded further administration of IV iron.  · Initiated Procrit 20,000 units every 4 weeks on 9/4/2020.  · On 1/6/2021, hemoglobin declined significantly to 7.1.  This was  repeated and verified at 7.2.  No evidence of GI blood loss, stool cards negative for occult blood x3 on 1/12/2021.  Additional labs on 1/6/2021 with iron 47, ferritin 430, iron saturation 20%, TIBC 235, folate greater than 20, B12 324, haptoglobin 525, .  · Transfused 2 unit PRBC on 1/7/2021  · Change in Procrit dosing to weekly  · Due to low normal B12 level initiated oral B12 1000 mcg daily.  · Patient with symptomatic decline in hemoglobin to 7.7 on 4/1/2021, received 1 unit PRBC transfusion  · Hospitalization 4/29-5/4/2021 with RYAN/CKD, UTI, anemia.  Required 2 unit PRBC transfusion and initiated hemodialysis Tuesday Thursday Saturday.  · Transfused 2 units PRBC for hemoglobin 7.2 on 5/27/2021  · Following initiation of hemodialysis, management of BEAU transitioned to nephrology, receiving Epogen with dialysis.  · On 7/21/2021, decline in hemoglobin to 6.5 requiring additional transfusion support.  On 7/21/2021, additional anemia evaluation with iron 17, ferritin 646, iron saturation 9%, TIBC 193, folate greater than 20, B12 690.  Contacted Dr. Antonio in nephrology and confirmed that she was receiving maximum dose Epogen with dialysis.   · Patient received additional transfusion with hemoglobin down to 7.4 on 8/18/2021.  · Ongoing transfusion support prompted alteration in Ibrance dosing on 8/23/2021 as outlined above.  · After alteration in Ibrance dosing, hemoglobin improved and remained stable in the 9-10 range.  · Subsequent worsening of anemia with hemoglobin down to 7.4 on 10/13/2021 requiring transfusion 2 unit PRBC  · Additional labs 10/27/2021 with iron 14, ferritin seven or 97.4, iron saturation 7%, TIBC 213, folate greater than 20, CRP 18.06, B12 1452. Consistent with anemia secondary to chronic disease/malignancy and ESRD  · Stool negative for occult blood x3 on 11/2/2021  · Patient returns today with hemoglobin of 8.1. This has gradually declined since her transfusion. She continues on  maximum dose Epogen with dialysis per Dr. Montaño. She also is receiving intermittent IV iron, reports receiving iron last week. We will continue to monitor her count with transfusion support as needed.  3. ESRD on HD:  · Baseline creatinine recently in 1.6-2.0 range  · On 9/10/2019, RYAN/CKD3 with creatinine up to 2.44, BUN of 40.  Received 1 L normal saline.  Patient with increase in oral fluid intake.  · Renal ultrasound 9/20/2019 with no evidence of obstructive uropathy.  · Diminished oral intake due to nausea from Ibrance, creatinine 2.79 with BUN 43 on 10/15/2019.  Received 1 L normal saline.  Repeat labs on 10/18/2019 with BUN 31, creatinine 2.0.  · During cycle 3 Ibrance, patient developed increase in creatinine on 11/6/2019 to 2.35 and received 1 L normal saline.  · Patient is trying to maintain adequate oral hydration.    · Gradual escalation of creatinine into the 2-2.4 range and subsequently into the 2.5-2.8 range  · Creatinine increased into the low 3 range in early January 2021.  Patient increased oral hydration with improvement back into the mid 2 range.  · Hospitalization 4/29-5/4/2021 with RYAN/CKD, UTI, anemia.  Required 2 unit PRBC transfusion and initiated hemodialysis Tuesday Thursday Saturday.   · Patient reports recent decrease in frequency of dialysis to twice per week on Tuesdays and Saturdays.  She continues to produce a significant amount of urine.    · Status post left upper extremity AV fistula placement on 11/3/2021 by vascular surgery  4. CHF with mild to moderate aortic stenosis:  · Recent cardiology evaluation with echocardiogram 7/12/2019 showing ejection fraction 48% with moderate dilation of the LV cavity, global hypokinesis, grade 2 diastolic dysfunction, mild to moderate aortic stenosis, trivial pericardial effusion.  · CT chest 7/12/2019 with no change in the aorta compared to prior study 12/13/2017.  · Echocardiogram 7/19/2021 with ejection fraction 56%, left atrial volume  moderately increased, grade 2 diastolic dysfunction, severe calcification aortic valve, moderate aortic stenosis, severe mitral calcification, mild mitral stenosis, marked elevation in RVSP from tricuspid regurgitation.  · Patient notes that she discussed echo results with cardiology and valvular status was felt to be stable in regards to aortic stenosis.  5. Left upper and bilateral lower extremity peripheral neuropathy:  · Patient reports that left upper extremity neuropathy was not present from previous chemotherapy but occurred after hospitalization that required intubation and critical care stay.  Apparently felt to represent critical care neuropathy.  Improved over time.  · Bilateral lower extremity neuropathy felt to be related to diabetes  · May have future implications regarding treatment for breast cancer.  · Patient reports that peripheral neuropathy symptoms continue in the bilateral lower extremities, unchanged  6. Uterine/endometrial activity on PET scan:  · Patient experienced postmenopausal vaginal bleeding in 2013, negative endometrial biopsy and gynecologic evaluation.  · With findings on PET scan with enlarging uterus and some hypermetabolic activity, referred back to Dr. Oliveros and gynecology.    · 9/19/2019 D&C with hysteroscopy by Dr. Oliveros, no significant intraoperative findings, pathologic results with benign findings, no evidence of malignancy.  7. Nausea:  · Mild, relieved with Zofran.   · Patient experienced improvement in nausea after initiating hemodialysis  8. Myelosuppression secondary to Ibrance:  · With cycle 1, jessica WBC 2.49 with ANC 1.25 and platelet count 140,000.  · With cycle 2, jessica WBC 2.33 with ANC 1.06, maintained normal platelet count  · Today, WBC 4.09 with ANC 2.82, platelet count 213,000.  9. Depression  · When seen on 3/4/2021, patient indicated increasing symptoms of depression.  She did not wish to discuss this further at the time.  She indicated that she was  coping adequately.  She was offered referral to the supportive oncology clinic however declined this.  · Patient is tearful today regarding news of progression of her malignancy. I again recommended referral to supportive oncology and she is agreeable.  10. Recurrent UTI with enlarging staghorn calculi in the left kidney  · CT 4/22/2021 showed interval enlargement of 2 staghorn calculi in the left kidney with persistent left perinephric stranding  · Hospitalization 4/29-5/4/2021 with RYAN/CKD, UTI, anemia.  Required 2 unit PRBC transfusion and initiated hemodialysis Tuesday Thursday Saturday.    · Discussion from urology regarding potential need for surgical procedure to address staghorn calculus left kidney  · CT scan 7/2/2021 with only 1 remaining left renal stone identified which had decreased in size.  Patient appears to have passed the other stone.  · As above, CT 10/26/2021 with more prominent left hydronephrosis and increase in left perinephric and periureteral stranding.  Felt to possibly be related to passage of recent stone versus partial obstruction secondary to debris or thrombus in the left ureter versus pyelonephritis.  Patient however with no clinical evidence of recent stone passage nor pyelonephritis. Malignancy felt to be the cause of CT changes around the left kidney at this point. Patient is scheduled to see urology on 11/22/2021 for further follow-up.    Plan:  1. Continue Ibrance 100 mg daily for 7 days on followed by 7 days off.  2. Discontinue Aromasin  3. Patient will return to lab today to draw peripheral blood [facility] germline mutation testing (primarily evaluating for presence germline BETH mutation based on previous liquid biopsy results)  4. Schedule MRI cervical spine (without IV contrast) within the next week.  Prescription sent for Ativan 1 mg prior to MRI, may repeat x1 if needed.  5. The patient continues on hemodialysis Tuesday and Saturday (2 days/week currently)  6. Nephrology is  currently managing the patient's anemia, patient receiving Epogen with dialysis as well as intermittent IV iron.  7. Continue oral folic acid 1 mg daily, B12 1000 mcg daily.  8. Referral to supportive oncology clinic  9. Patient is scheduled for follow-up visit with urology on 11/22/2021  10. Return in 1 week for nurse practitioner visit with CBC, CMP and begin Faslodex 500 mg IM injection days 1, 15 with cycle 1 and subsequently day 1 every 28 days beginning with cycle 2.  11. MD visit in 3 weeks with CBC, CMP and patient will be due for cycle 1 day 15 Faslodex.     Patient continues on high risk medication requiring intensive monitoring.    I did spend 55 minutes with the patient today, time spent in review of records, face-to-face consultation, coordination of care, placement of orders, completion of documentation.

## 2021-11-12 ENCOUNTER — OFFICE VISIT (OUTPATIENT)
Dept: ONCOLOGY | Facility: CLINIC | Age: 61
End: 2021-11-12

## 2021-11-12 ENCOUNTER — LAB (OUTPATIENT)
Dept: LAB | Facility: HOSPITAL | Age: 61
End: 2021-11-12

## 2021-11-12 VITALS
HEART RATE: 67 BPM | TEMPERATURE: 98.4 F | WEIGHT: 293 LBS | BODY MASS INDEX: 45.99 KG/M2 | RESPIRATION RATE: 16 BRPM | SYSTOLIC BLOOD PRESSURE: 134 MMHG | OXYGEN SATURATION: 96 % | DIASTOLIC BLOOD PRESSURE: 80 MMHG | HEIGHT: 67 IN

## 2021-11-12 DIAGNOSIS — C50.919 METASTATIC BREAST CANCER: Primary | ICD-10-CM

## 2021-11-12 DIAGNOSIS — C50.919 METASTATIC BREAST CANCER: ICD-10-CM

## 2021-11-12 DIAGNOSIS — D63.8 ANEMIA, CHRONIC DISEASE: ICD-10-CM

## 2021-11-12 LAB
ALBUMIN SERPL-MCNC: 3.2 G/DL (ref 3.5–5.2)
ALBUMIN/GLOB SERPL: 0.8 G/DL (ref 1.1–2.4)
ALP SERPL-CCNC: 89 U/L (ref 38–116)
ALT SERPL W P-5'-P-CCNC: <5 U/L (ref 0–33)
ANION GAP SERPL CALCULATED.3IONS-SCNC: 11.4 MMOL/L (ref 5–15)
AST SERPL-CCNC: 8 U/L (ref 0–32)
BASOPHILS # BLD AUTO: 0.06 10*3/MM3 (ref 0–0.2)
BASOPHILS NFR BLD AUTO: 1.5 % (ref 0–1.5)
BILIRUB SERPL-MCNC: 0.5 MG/DL (ref 0.2–1.2)
BUN SERPL-MCNC: 32 MG/DL (ref 6–20)
BUN/CREAT SERPL: 6.9 (ref 7.3–30)
CALCIUM SPEC-SCNC: 8.7 MG/DL (ref 8.5–10.2)
CHLORIDE SERPL-SCNC: 105 MMOL/L (ref 98–107)
CO2 SERPL-SCNC: 22.6 MMOL/L (ref 22–29)
CREAT SERPL-MCNC: 4.64 MG/DL (ref 0.6–1.1)
DEPRECATED RDW RBC AUTO: 66.4 FL (ref 37–54)
EOSINOPHIL # BLD AUTO: 0.07 10*3/MM3 (ref 0–0.4)
EOSINOPHIL NFR BLD AUTO: 1.7 % (ref 0.3–6.2)
ERYTHROCYTE [DISTWIDTH] IN BLOOD BY AUTOMATED COUNT: 17.5 % (ref 12.3–15.4)
GFR SERPL CREATININE-BSD FRML MDRD: 10 ML/MIN/1.73
GFR SERPL CREATININE-BSD FRML MDRD: ABNORMAL ML/MIN/{1.73_M2}
GLOBULIN UR ELPH-MCNC: 4.1 GM/DL (ref 1.8–3.5)
GLUCOSE SERPL-MCNC: 170 MG/DL (ref 74–124)
HCT VFR BLD AUTO: 27 % (ref 34–46.6)
HGB BLD-MCNC: 8.1 G/DL (ref 12–15.9)
IMM GRANULOCYTES # BLD AUTO: 0.03 10*3/MM3 (ref 0–0.05)
IMM GRANULOCYTES NFR BLD AUTO: 0.7 % (ref 0–0.5)
LYMPHOCYTES # BLD AUTO: 0.85 10*3/MM3 (ref 0.7–3.1)
LYMPHOCYTES NFR BLD AUTO: 20.8 % (ref 19.6–45.3)
MCH RBC QN AUTO: 31.9 PG (ref 26.6–33)
MCHC RBC AUTO-ENTMCNC: 30 G/DL (ref 31.5–35.7)
MCV RBC AUTO: 106.3 FL (ref 79–97)
MONOCYTES # BLD AUTO: 0.26 10*3/MM3 (ref 0.1–0.9)
MONOCYTES NFR BLD AUTO: 6.4 % (ref 5–12)
NEUTROPHILS NFR BLD AUTO: 2.82 10*3/MM3 (ref 1.7–7)
NEUTROPHILS NFR BLD AUTO: 68.9 % (ref 42.7–76)
NRBC BLD AUTO-RTO: 0 /100 WBC (ref 0–0.2)
PLATELET # BLD AUTO: 213 10*3/MM3 (ref 140–450)
PMV BLD AUTO: 8.7 FL (ref 6–12)
POTASSIUM SERPL-SCNC: 4.4 MMOL/L (ref 3.5–4.7)
PROT SERPL-MCNC: 7.3 G/DL (ref 6.3–8)
RBC # BLD AUTO: 2.54 10*6/MM3 (ref 3.77–5.28)
SODIUM SERPL-SCNC: 139 MMOL/L (ref 134–145)
WBC # BLD AUTO: 4.09 10*3/MM3 (ref 3.4–10.8)

## 2021-11-12 PROCEDURE — 99215 OFFICE O/P EST HI 40 MIN: CPT | Performed by: INTERNAL MEDICINE

## 2021-11-12 PROCEDURE — 36415 COLL VENOUS BLD VENIPUNCTURE: CPT

## 2021-11-12 PROCEDURE — 80053 COMPREHEN METABOLIC PANEL: CPT

## 2021-11-12 PROCEDURE — 85025 COMPLETE CBC W/AUTO DIFF WBC: CPT

## 2021-11-12 RX ORDER — LORAZEPAM 1 MG/1
1 TABLET ORAL
Qty: 2 TABLET | Refills: 0 | Status: SHIPPED | OUTPATIENT
Start: 2021-11-12 | End: 2021-11-12

## 2021-11-15 ENCOUNTER — SPECIALTY PHARMACY (OUTPATIENT)
Dept: PHARMACY | Facility: HOSPITAL | Age: 61
End: 2021-11-15

## 2021-11-16 ENCOUNTER — HOSPITAL ENCOUNTER (OUTPATIENT)
Dept: MRI IMAGING | Facility: HOSPITAL | Age: 61
Discharge: HOME | End: 2021-11-16

## 2021-11-16 DIAGNOSIS — C50.919 METASTATIC BREAST CANCER: ICD-10-CM

## 2021-11-17 ENCOUNTER — TELEPHONE (OUTPATIENT)
Dept: ONCOLOGY | Facility: CLINIC | Age: 61
End: 2021-11-17

## 2021-11-17 RX ORDER — LORAZEPAM 1 MG/1
TABLET ORAL
Qty: 2 TABLET | Refills: 0 | Status: SHIPPED | OUTPATIENT
Start: 2021-11-17 | End: 2021-11-26

## 2021-11-17 NOTE — TELEPHONE ENCOUNTER
Called patient regarding her canceled MRI yesterday. Patient stated that her arms would not fit in the machine, and she was told the scan would need to be done on a larger machine. Scheduling was able to get patient on the schedule for the open MRI at [facility] tomorrow, November 18. Patient requested additional ativan to be taken prior to her scan. Will request rx for ativan from Dr. Irvin. Confirmed time and location of MRI with patient. Patient v/u.

## 2021-11-18 ENCOUNTER — OFFICE VISIT (OUTPATIENT)
Dept: PSYCHIATRY | Facility: HOSPITAL | Age: 61
End: 2021-11-18

## 2021-11-18 ENCOUNTER — HOSPITAL ENCOUNTER (OUTPATIENT)
Dept: MRI IMAGING | Facility: HOSPITAL | Age: 61
Discharge: HOME | End: 2021-11-18

## 2021-11-18 DIAGNOSIS — F32.A ANXIETY AND DEPRESSION: ICD-10-CM

## 2021-11-18 DIAGNOSIS — F43.21 GRIEF: ICD-10-CM

## 2021-11-18 DIAGNOSIS — G47.33 OSA (OBSTRUCTIVE SLEEP APNEA): ICD-10-CM

## 2021-11-18 DIAGNOSIS — F41.9 ANXIETY AND DEPRESSION: ICD-10-CM

## 2021-11-18 DIAGNOSIS — F33.1 MODERATE EPISODE OF RECURRENT MAJOR DEPRESSIVE DISORDER (HCC): Primary | ICD-10-CM

## 2021-11-18 PROCEDURE — 90792 PSYCH DIAG EVAL W/MED SRVCS: CPT | Performed by: NURSE PRACTITIONER

## 2021-11-18 PROCEDURE — 72141 MRI NECK SPINE W/O DYE: CPT

## 2021-11-18 RX ORDER — ARMODAFINIL 50 MG/1
50 TABLET ORAL DAILY
Qty: 30 TABLET | Refills: 0 | Status: SHIPPED | OUTPATIENT
Start: 2021-11-18 | End: 2021-12-18

## 2021-11-18 RX ORDER — SERTRALINE HYDROCHLORIDE 100 MG/1
100 TABLET, FILM COATED ORAL DAILY
Qty: 90 TABLET | Refills: 0 | Status: SHIPPED | OUTPATIENT
Start: 2021-11-18 | End: 2022-03-03 | Stop reason: SDUPTHER

## 2021-11-22 ENCOUNTER — INFUSION (OUTPATIENT)
Dept: ONCOLOGY | Facility: HOSPITAL | Age: 61
End: 2021-11-22

## 2021-11-22 ENCOUNTER — LAB (OUTPATIENT)
Dept: LAB | Facility: HOSPITAL | Age: 61
End: 2021-11-22

## 2021-11-22 ENCOUNTER — OFFICE VISIT (OUTPATIENT)
Dept: ONCOLOGY | Facility: CLINIC | Age: 61
End: 2021-11-22

## 2021-11-22 VITALS
DIASTOLIC BLOOD PRESSURE: 62 MMHG | HEIGHT: 67 IN | OXYGEN SATURATION: 94 % | SYSTOLIC BLOOD PRESSURE: 122 MMHG | BODY MASS INDEX: 57 KG/M2 | TEMPERATURE: 97.8 F | RESPIRATION RATE: 16 BRPM | HEART RATE: 75 BPM

## 2021-11-22 DIAGNOSIS — N18.6 ANEMIA DUE TO CHRONIC KIDNEY DISEASE, ON CHRONIC DIALYSIS (HCC): ICD-10-CM

## 2021-11-22 DIAGNOSIS — C50.919 METASTATIC BREAST CANCER: Primary | ICD-10-CM

## 2021-11-22 DIAGNOSIS — Z79.899 HIGH RISK MEDICATION USE: ICD-10-CM

## 2021-11-22 DIAGNOSIS — Z99.2 ANEMIA DUE TO CHRONIC KIDNEY DISEASE, ON CHRONIC DIALYSIS (HCC): ICD-10-CM

## 2021-11-22 DIAGNOSIS — C50.919 METASTATIC BREAST CANCER: ICD-10-CM

## 2021-11-22 DIAGNOSIS — E04.1 THYROID NODULE: ICD-10-CM

## 2021-11-22 DIAGNOSIS — D63.1 ANEMIA DUE TO CHRONIC KIDNEY DISEASE, ON CHRONIC DIALYSIS (HCC): ICD-10-CM

## 2021-11-22 LAB
ALBUMIN SERPL-MCNC: 3.3 G/DL (ref 3.5–5.2)
ALBUMIN/GLOB SERPL: 0.8 G/DL
ALP SERPL-CCNC: 110 U/L (ref 39–117)
ALT SERPL W P-5'-P-CCNC: 15 U/L (ref 1–33)
ANION GAP SERPL CALCULATED.3IONS-SCNC: 13.5 MMOL/L (ref 5–15)
AST SERPL-CCNC: 19 U/L (ref 1–32)
BASOPHILS # BLD AUTO: 0.07 10*3/MM3 (ref 0–0.2)
BASOPHILS NFR BLD AUTO: 2.8 % (ref 0–1.5)
BILIRUB SERPL-MCNC: 0.4 MG/DL (ref 0–1.2)
BUN SERPL-MCNC: 33 MG/DL (ref 8–23)
BUN/CREAT SERPL: 7.1 (ref 7–25)
CALCIUM SPEC-SCNC: 8.5 MG/DL (ref 8.6–10.5)
CHLORIDE SERPL-SCNC: 99 MMOL/L (ref 98–107)
CO2 SERPL-SCNC: 22.5 MMOL/L (ref 22–29)
CREAT SERPL-MCNC: 4.65 MG/DL (ref 0.57–1)
DEPRECATED RDW RBC AUTO: 62.3 FL (ref 37–54)
EOSINOPHIL # BLD AUTO: 0.09 10*3/MM3 (ref 0–0.4)
EOSINOPHIL NFR BLD AUTO: 3.6 % (ref 0.3–6.2)
ERYTHROCYTE [DISTWIDTH] IN BLOOD BY AUTOMATED COUNT: 16.7 % (ref 12.3–15.4)
GFR SERPL CREATININE-BSD FRML MDRD: 10 ML/MIN/1.73
GFR SERPL CREATININE-BSD FRML MDRD: ABNORMAL ML/MIN/{1.73_M2}
GLOBULIN UR ELPH-MCNC: 4.3 GM/DL
GLUCOSE SERPL-MCNC: 142 MG/DL (ref 65–99)
HCT VFR BLD AUTO: 24 % (ref 34–46.6)
HGB BLD-MCNC: 7.4 G/DL (ref 12–15.9)
IMM GRANULOCYTES # BLD AUTO: 0.02 10*3/MM3 (ref 0–0.05)
IMM GRANULOCYTES NFR BLD AUTO: 0.8 % (ref 0–0.5)
LYMPHOCYTES # BLD AUTO: 0.58 10*3/MM3 (ref 0.7–3.1)
LYMPHOCYTES NFR BLD AUTO: 23.5 % (ref 19.6–45.3)
MCH RBC QN AUTO: 31.5 PG (ref 26.6–33)
MCHC RBC AUTO-ENTMCNC: 30.8 G/DL (ref 31.5–35.7)
MCV RBC AUTO: 102.1 FL (ref 79–97)
MONOCYTES # BLD AUTO: 0.12 10*3/MM3 (ref 0.1–0.9)
MONOCYTES NFR BLD AUTO: 4.9 % (ref 5–12)
NEUTROPHILS NFR BLD AUTO: 1.59 10*3/MM3 (ref 1.7–7)
NEUTROPHILS NFR BLD AUTO: 64.4 % (ref 42.7–76)
NRBC BLD AUTO-RTO: 0 /100 WBC (ref 0–0.2)
PLATELET # BLD AUTO: 270 10*3/MM3 (ref 140–450)
PMV BLD AUTO: 9.4 FL (ref 6–12)
POTASSIUM SERPL-SCNC: 4.1 MMOL/L (ref 3.5–5.2)
PROT SERPL-MCNC: 7.6 G/DL (ref 6–8.5)
RBC # BLD AUTO: 2.35 10*6/MM3 (ref 3.77–5.28)
SODIUM SERPL-SCNC: 135 MMOL/L (ref 136–145)
WBC NRBC COR # BLD: 2.47 10*3/MM3 (ref 3.4–10.8)

## 2021-11-22 PROCEDURE — 85025 COMPLETE CBC W/AUTO DIFF WBC: CPT

## 2021-11-22 PROCEDURE — 36415 COLL VENOUS BLD VENIPUNCTURE: CPT

## 2021-11-22 PROCEDURE — 25010000002 FULVESTRANT PER 25 MG: Performed by: NURSE PRACTITIONER

## 2021-11-22 PROCEDURE — 99215 OFFICE O/P EST HI 40 MIN: CPT | Performed by: NURSE PRACTITIONER

## 2021-11-22 PROCEDURE — 96402 CHEMO HORMON ANTINEOPL SQ/IM: CPT

## 2021-11-22 PROCEDURE — 80053 COMPREHEN METABOLIC PANEL: CPT | Performed by: INTERNAL MEDICINE

## 2021-11-22 RX ORDER — SODIUM CHLORIDE 9 MG/ML
250 INJECTION, SOLUTION INTRAVENOUS AS NEEDED
Status: CANCELLED | OUTPATIENT
Start: 2021-11-26

## 2021-11-22 RX ORDER — DIPHENHYDRAMINE HCL 25 MG
25 CAPSULE ORAL ONCE
Status: CANCELLED | OUTPATIENT
Start: 2021-11-26 | End: 2021-11-22

## 2021-11-22 RX ADMIN — FULVESTRANT 500 MG: 250 INJECTION INTRAMUSCULAR at 11:42

## 2021-11-22 NOTE — PROGRESS NOTES
"Chief Complaint  Metastatic lobular breast cancer (ER/MS positive, HER-2/tj negative), anemia secondary to CKD3, CHF, peripheral neuropathy, chemotherapy related nausea chemotherapy-induced myelosuppression    Subjective        History of Present Illness  Patient returns today in follow-up with laboratory studies and MRI review and initiation of Faslodex.  She currently continues on Ibrance 100 mg 7 days on, 7 days off.  She is currently in her week off therapy.  When evaluated last by Dr. Irvin 10 days ago, she was noted to have progressive disease on PET scan.  She was therefore instructed to discontinue Aromasin and proceed with Faslodex.  Additionally, she underwent MRI of the neck for questionable cervical spine lesion which we will review today.  She continues to undergo dialysis now 2 days a week on Tuesday and Saturday.  She has an AV fistula in her left upper extremity placed by vascular surgery 11/3/2021.  She receives BEAU through nephrology.  She does continue to require intermittent transfusion support for symptomatic anemia.  She reports she is symptomatic today with shortness of breath and significant fatigue.  She is not surprised she is in need of transfusion.  She continues to struggle with mobility and neuropathy utilizing her motorized scooter.  She denies any new pain.  She denies shortness of breath or difficulty breathing.      Objective   Vital Signs:   /62   Pulse 75   Temp 97.8 °F (36.6 °C) (Temporal)   Resp 16   Ht 170.2 cm (67.01\")   SpO2 94%   BMI 57.00 kg/m²       Physical Exam  Constitutional:       Appearance: She is well-developed. She is obese.      Comments: Patient is in a motorized scooter today   Eyes:      Conjunctiva/sclera: Conjunctivae normal.   Neck:      Thyroid: No thyromegaly.   Cardiovascular:      Rate and Rhythm: Normal rate and regular rhythm.      Heart sounds: Murmur heard.   No friction rub. No gallop.       Comments: 2/6 murmur  Pulmonary:      " Effort: No respiratory distress.      Breath sounds: Normal breath sounds.      Comments: Dialysis access in place in the right subclavian position  Chest:   Breasts:      Right: No supraclavicular adenopathy.      Left: No supraclavicular adenopathy.       Abdominal:      General: Bowel sounds are normal. There is no distension.      Palpations: Abdomen is soft.      Tenderness: There is no abdominal tenderness.   Musculoskeletal:         General: Swelling present.      Comments: Trace bilateral lower extremity edema   Lymphadenopathy:      Head:      Right side of head: No submandibular adenopathy.      Cervical: No cervical adenopathy.      Upper Body:      Right upper body: No supraclavicular adenopathy.      Left upper body: No supraclavicular adenopathy.   Skin:     General: Skin is warm and dry.      Findings: Bruising present. No rash.      Comments: There is significant bruising noted in the left forearm, healing left antecubital fossa surgical incision.   Neurological:      Mental Status: She is alert and oriented to person, place, and time.      Cranial Nerves: No cranial nerve deficit.      Motor: No abnormal muscle tone.      Deep Tendon Reflexes: Reflexes normal.   Psychiatric:         Behavior: Behavior normal.     I have reexamined the patient and the results are consistent with the previously documented exam. Annabelle Atwood, APRN       Result Review :  Comprehensive Metabolic Panel (11/22/2021 10:09)  CBC & Differential (11/22/2021 10:09)    IMAGING:  MRI Cervical Spine Without Contrast (11/18/2021 20:36)       Assessment and Plan    1. Metastatic lobular breast cancer (ER/IL positive, HER-2/tj negative):  · History of stage IIIC right breast cancer ([id number])  · Patient treated previously in the [facility]  · Diagnosis via excisional biopsy 8/23/2003 with lobular carcinoma, 4.5 cm, positive margins.  ER/IL positive, HER-2/tj negative.  · Staging evaluation in September 2003 with  negative bone scan and negative CT scans.  Ejection fraction 65%.  · Received neoadjuvant chemotherapy (? GET regimen) which apparently included Taxotere and possibly epirubicin.  Received 6 cycles.  · 1/22/2004 bilateral mastectomy with right axillary dissection.  Per available records, 10-12 cm residual invasive lobular carcinoma in the breast with 14/16 lymph nodes positive with extranodal extension.  Immediate reconstruction.  · Received adjuvant chemotherapy with carboplatin and navelbine x4 cycles  · Initiated adjuvant tamoxifen 6/22/2004  · Adjuvant radiation therapy initiated but interrupted due to chest wall cellulitis requiring removal of implants in August 2004  · Implant reconstruction again attempted with MRSA infection, implant removed 12/30/2005 with no further attempts made and reconstruction.  · Completed 5 years tamoxifen in June 2009 and subsequently transitioned to Femara.  Received Femara until June 2014.  · Patient experienced postmenopausal vaginal bleeding in 2013, negative endometrial biopsy and gynecologic evaluation.  · Patient last seen in [facility] 6/18/2014  · CT chest performed to evaluate bicuspid aortic valve and dilated asending aorta 7/12/2019.  CT showed no change in aorta however showed new free intraperitoneal fluid in the upper abdomen with mesenteric edema, concerning for carcinomatosis.  · CT abdomen and pelvis (without IV contrast) on 7/16/2019 with mesenteric thickening, small volume of complex fluid in the mesentery which was suspicious and possibly representative of carcinomatosis, small amount of perihepatic fluid, small bowel loops tethered to omentum without obstruction, omental thickening, shotty retroperitoneal and pelvic lymph nodes (non-pathologically enlarged).  · PET scan 7/26/2019 with hypermetabolic omental activity, hypermetabolic small retroperitoneal lymph nodes, hypermetabolic activity throughout uterus with increase in size.  · CT-guided omental biopsy  7/30/2019 with metastatic lobular carcinoma of breast primary, ER positive (greater than 95%), HI positive (90%), HER-2/tj negative (1+ IHC)  · Baseline CA 15-3 on 7/22/19 was 32.6  · Initiation of Aromasin 25 mg daily 8/12/2019.  Initiated Ibrance at 100 mg 21/28 days on 8/26/2019.  · Improvement in CA 15-3 on 9/17/19-26.3  · Following 2 cycles of Aromasin and Ibrance, CA 15-3 declined to 25.9 on 10/15/2019.  CT scan chest abdomen pelvis 10/16/2019 showed improvement in the mesenteric and omental thickening and near resolution of complex ascites.  Treatment continued.  · Stable findings on subsequent CT chest abdomen pelvis 12/11/2019.  Further improvement in CA 15-3-23.9 on 12/18/2019.  Ibrance/Aromasin continued.   · [facility] liquid biopsy 2/5/2020: MSI undetermined, mutations in BETH, NF1, CHEK2.  No evidence of PIK3CA mutation.  · Slight increase in CA 15-3 to 27.1 on 2/19/2020 and 29.1 on 3/18/2020.  CT however on 3/13/2020 with no new findings.  · Subsequent improvement in CA 15-3-26.5 on 5/15/2020  · CT 7/31/2020 showed evidence of stable disease.  CA 15-3 declined further to 23.1 on 7/24/2020.  · CT 10/19/2020 with stable disease.  Fluctuations in CA 15-3 of unclear significance.  · CA 15-3 on 1/6/2021 decreased to 23.  CT chest abdomen pelvis 1/29/2021 with stable findings.  · CA 15-3 on 4/1/2021 was 28.4  · CT 4/22/2021 with no evidence of progressive malignancy, notation of new focal linear subpleural opacification right upper lobe felt to be infectious and/or inflammatory.  Enlargement of 2 staghorn calculi left kidney.  Persistent left perinephric stranding.    · Hospitalization 4/29-5/4/2021 with RYAN/CKD, UTI, anemia.  Required 2 unit PRBC transfusion and initiated hemodialysis Tuesday Thursday Saturday.  Brief interruption in Ibrance during hospitalization.  · CA 15-3 on 5/27/2021 was 27.8.  · CT chest abdomen pelvis 7/2/2021 showed no evidence of progression or new metastatic disease.   There was only one remaining left renal stone which was smaller than previous exam.  Stranding surrounding both kidneys left greater than right with left hydronephrosis and hydroureter.  · CA 15-3 7/7/2021 was 25.3  · Patient experienced severe ongoing anemia felt to be in part related to Ibrance requiring transfusion support intermittently.  Ibrance dose decreased on 8/23/2020 1 to 100 mg daily for 7 days on followed by 7 days off.  · Stable CA 15-3 on 9/1/2021 at 25.5 and again 10/13/2021 at 25.6.  · CT chest abdomen pelvis 10/26/2021 with increase in left hydronephrosis with increase in left perinephric and periureteral stranding. Decrease in stone in the left kidney lower pole (7 mm).  Stable small retroperitoneal lymph and left periaortic lymph nodes.  · PET scan 11/10/2021 with multiple bilateral hypermetabolic nodular pulmonary lesions, asymmetric moderate to severe left hydronephrosis with ill-defined fat stranding and soft tissue thickening in the renal sinus and surrounding the left ureter, hypermetabolic left retroperitoneal lymph node with slight increase in size, ill-defined hypermetabolic thickening in the inferior omentum with increase in size, uptake and C7 (indeterminate, recommended MRI).  Short segments of uptake in the mid and distal esophagus possibly related to gastritis.  · PET scan findings felt to be consistent with progressive malignancy.  Patient declined repeat omental biopsy.   · Options for further treatment discussed on 11/12/2021.  In light of renal failure and dialysis, options are more limited.  Recommendation to continue Ibrance and discontinue Aromasin, begin Faslodex.  Discussed possible future use of single agent Taxol.  Previous liquid biopsy with BETH mutation, unclear whether germline and will obtain [facility] panel test.  If germline BETH mutation consider use of Lynparza for possible future treatment.  · Aromasin discontinued 11/12/2021  · On 11/19/2021 initiation of Faslodex  with continuation of Ibrance (100 mg daily for 7 days on followed by 7 days off).  · PET scan as noted above on 11/10/2021 shows new hypermetabolic lung lesions, hypermetabolic activity in the area of left hydronephrosis with fat stranding and soft tissue thickening around the left renal sinus and left ureter as well as increase in hypermetabolic nodule in the omentum and in hypermetabolic lymph node in the left retroperitoneum. There was a question regarding uptake at C7 however upon discussion with Dr. Conde, he feels that this is indeterminate and that we should proceed with MRI to further evaluate C7. I discussed with the patient today whether to proceed with repeat biopsy of her omentum to see if there has been any change in the breast cancer present. It does however appear to be continuing to behave in an indolent fashion with no risks of behavior. Patient does not wish to proceed with a biopsy. We discussed further treatment options and we do have limitations in regards mainly to her renal failure on dialysis. We discussed the option for additional endocrine therapy by discontinuing Aromasin and switching to Faslodex with continuation of Ibrance at her current dose. We discussed the possibility of transitioning to IV chemotherapy with Taxol however I am not sure whether her hormone receptor positive disease will respond well to chemotherapy and there would be significant increased risk for side effects and complications with chemotherapy. This does remain a future treatment option.  Aromasin was discontinued with plans to transition to Faslodex  · Liquid biopsy indicating BETH mutation and is unclear whether this is a germline mutation. If so this may indicate some potential to treat her with olaparib.  [facility] panel was sent 11/12/2021.   · Returns the office today, 11/22/2021 to initiate Faslodex.  She continues on Ibrance 100 mg 7 days on, 7 days off.  She did undergo an MRI of the cervical spine which  confirmed a metastatic lesion at C7.  She is asymptomatic of this.  Additionally, a thyroid nodule on the right was noted, 1.8 x 1.4 x 1.1 cm.  Patient will require thyroid ultrasound.  She will return as scheduled 12/3/2021 for MD follow-up with Dr. Irvin.  However, she will not be able to receive day 15 Faslodex until Monday, 12/6/2021.  She is currently symptomatic of her hemoglobin of 7.4 and will require transfusion support of 2 units packed red blood cells  2. Anemia secondary to ESRD, exacerbated by Ibrance:  · The patient appears to have a chronic anemia with hemoglobin in the 10-11 range with normal MCV.  · Patient has underlying CKD3 with baseline creatinine recently in the 1.5-1.8 range.  · Anemia evaluation 7/22/2019 with iron 30, ferritin 85.2, iron saturation 10%, TIBC 311, B12 370, erythropoietin level 20.4  · Anemia felt in large part to be related to CKD3 as well as to underlying malignancy  · Evidence of folate deficiency 8/12/2019 with level 3.84, initiated folic acid 1 mg daily  · Additional labs on 12/18/2019 with iron 73, ferritin 82.2, iron saturation 25%, TIBC 290.  We did discuss the potential use of Procrit if her hemoglobin declines into the low 9/upper 8 range.  · Decline in hemoglobin into the 8 range, iron studies 7/20/2020 with iron 54, ferritin 66.7, iron saturation 16%, TIBC 328.  On 8/7/2020 proceeded with Injectafer 750 mg IV x1 dose.  Second dose not administered due to onset of fever, subsequent iron studies precluded further administration of IV iron.  · Initiated Procrit 20,000 units every 4 weeks on 9/4/2020.  · On 1/6/2021, hemoglobin declined significantly to 7.1.  This was repeated and verified at 7.2.  No evidence of GI blood loss, stool cards negative for occult blood x3 on 1/12/2021.  Additional labs on 1/6/2021 with iron 47, ferritin 430, iron saturation 20%, TIBC 235, folate greater than 20, B12 324, haptoglobin 525, .  · Transfused 2 unit PRBC on  1/7/2021  · Change in Procrit dosing to weekly  · Due to low normal B12 level initiated oral B12 1000 mcg daily.  · Patient with symptomatic decline in hemoglobin to 7.7 on 4/1/2021, received 1 unit PRBC transfusion  · Hospitalization 4/29-5/4/2021 with RYAN/CKD, UTI, anemia.  Required 2 unit PRBC transfusion and initiated hemodialysis Tuesday Thursday Saturday.  · Transfused 2 units PRBC for hemoglobin 7.2 on 5/27/2021  · Following initiation of hemodialysis, management of BEAU transitioned to nephrology, receiving Epogen with dialysis.  · On 7/21/2021, decline in hemoglobin to 6.5 requiring additional transfusion support.  On 7/21/2021, additional anemia evaluation with iron 17, ferritin 646, iron saturation 9%, TIBC 193, folate greater than 20, B12 690.  Contacted Dr. Antonio in nephrology and confirmed that she was receiving maximum dose Epogen with dialysis.   · Patient received additional transfusion with hemoglobin down to 7.4 on 8/18/2021.  · Ongoing transfusion support prompted alteration in Ibrance dosing on 8/23/2021 as outlined above.  · After alteration in Ibrance dosing, hemoglobin improved and remained stable in the 9-10 range.  · Subsequent worsening of anemia with hemoglobin down to 7.4 on 10/13/2021 requiring transfusion 2 unit PRBC  · Additional labs 10/27/2021 with iron 14, ferritin seven or 97.4, iron saturation 7%, TIBC 213, folate greater than 20, CRP 18.06, B12 1452. Consistent with anemia secondary to chronic disease/malignancy and ESRD  · Stool negative for occult blood x3 on 11/2/2021  · She continues on maximum dose Epogen with dialysis per Dr. Montaño.   · Worsening hemoglobin today at 7.4.  The patient is symptomatic and will therefore proceed with transfusion of 2 units packed red blood cells  3. ESRD on HD:  · Baseline creatinine recently in 1.6-2.0 range  · On 9/10/2019, RYAN/CKD3 with creatinine up to 2.44, BUN of 40.  Received 1 L normal saline.  Patient with increase in oral fluid  intake.  · Renal ultrasound 9/20/2019 with no evidence of obstructive uropathy.  · Diminished oral intake due to nausea from Ibrance, creatinine 2.79 with BUN 43 on 10/15/2019.  Received 1 L normal saline.  Repeat labs on 10/18/2019 with BUN 31, creatinine 2.0.  · During cycle 3 Ibrance, patient developed increase in creatinine on 11/6/2019 to 2.35 and received 1 L normal saline.  · Patient is trying to maintain adequate oral hydration.    · Gradual escalation of creatinine into the 2-2.4 range and subsequently into the 2.5-2.8 range  · Creatinine increased into the low 3 range in early January 2021.  Patient increased oral hydration with improvement back into the mid 2 range.  · Hospitalization 4/29-5/4/2021 with RYAN/CKD, UTI, anemia.  Required 2 unit PRBC transfusion and initiated hemodialysis Tuesday Thursday Saturday.   · Patient reports recent decrease in frequency of dialysis to twice per week on Tuesdays and Saturdays.  She continues to produce a significant amount of urine.    · Status post left upper extremity AV fistula placement on 11/3/2021 by vascular surgery  4. CHF with mild to moderate aortic stenosis:  · Recent cardiology evaluation with echocardiogram 7/12/2019 showing ejection fraction 48% with moderate dilation of the LV cavity, global hypokinesis, grade 2 diastolic dysfunction, mild to moderate aortic stenosis, trivial pericardial effusion.  · CT chest 7/12/2019 with no change in the aorta compared to prior study 12/13/2017.  · Echocardiogram 7/19/2021 with ejection fraction 56%, left atrial volume moderately increased, grade 2 diastolic dysfunction, severe calcification aortic valve, moderate aortic stenosis, severe mitral calcification, mild mitral stenosis, marked elevation in RVSP from tricuspid regurgitation.  · Patient notes that she discussed echo results with cardiology and valvular status was felt to be stable in regards to aortic stenosis.  5. Left upper and bilateral lower extremity  peripheral neuropathy:  · Patient reports that left upper extremity neuropathy was not present from previous chemotherapy but occurred after hospitalization that required intubation and critical care stay.  Apparently felt to represent critical care neuropathy.  Improved over time.  · Bilateral lower extremity neuropathy felt to be related to diabetes  · May have future implications regarding treatment for breast cancer.  · Patient reports that peripheral neuropathy symptoms continue in the bilateral lower extremities, unchanged  6. Uterine/endometrial activity on PET scan:  · Patient experienced postmenopausal vaginal bleeding in 2013, negative endometrial biopsy and gynecologic evaluation.  · With findings on PET scan with enlarging uterus and some hypermetabolic activity, referred back to Dr. Oliveros and gynecology.    · 9/19/2019 D&C with hysteroscopy by Dr. Oliveros, no significant intraoperative findings, pathologic results with benign findings, no evidence of malignancy.  7. Nausea:  · Mild, relieved with Zofran.   · Patient experienced improvement in nausea after initiating hemodialysis  8. Myelosuppression secondary to Ibrance:  · With cycle 1, jessica WBC 2.49 with ANC 1.25 and platelet count 140,000.  · With cycle 2, jessica WBC 2.33 with ANC 1.06, maintained normal platelet count  · Today, WBC 2.47 with ANC 1.59, platelet count 270,000.  9. Depression  · When seen on 3/4/2021, patient indicated increasing symptoms of depression.  She did not wish to discuss this further at the time.  She indicated that she was coping adequately.  She was offered referral to the supportive oncology clinic however declined this.  · Patient is tearful 11/12/2021 regarding news of progression of her malignancy.Dr. Irvin recommended referral to supportive oncology and she is agreeable.  10. Recurrent UTI with enlarging staghorn calculi in the left kidney  · CT 4/22/2021 showed interval enlargement of 2 staghorn calculi in the left  kidney with persistent left perinephric stranding  · Hospitalization 4/29-5/4/2021 with RYAN/CKD, UTI, anemia.  Required 2 unit PRBC transfusion and initiated hemodialysis Tuesday Thursday Saturday.    · Discussion from urology regarding potential need for surgical procedure to address staghorn calculus left kidney  · CT scan 7/2/2021 with only 1 remaining left renal stone identified which had decreased in size.  Patient appears to have passed the other stone.  · As above, CT 10/26/2021 with more prominent left hydronephrosis and increase in left perinephric and periureteral stranding.  Felt to possibly be related to passage of recent stone versus partial obstruction secondary to debris or thrombus in the left ureter versus pyelonephritis.  Patient however with no clinical evidence of recent stone passage nor pyelonephritis. Malignancy felt to be the cause of CT changes around the left kidney at this point. Patient is scheduled to see urology on 11/22/2021 for further follow-up.  11. Thyroid nodule  · T2 hyperintense nodule within the right lobe of the thyroid measuring 1.8 x 1.4 x 1.0 cm noted on MRI 11/18/2021  · We will proceed with ultrasound of the thyroid    Plan:  1. Continue Ibrance 100 mg daily for 7 days on followed by 7 days off.  Is currently in her week off therapy  2. Proceed today with day 1 Faslodex  3. Proceed with transfusion of 2 units packed red blood cells  4. [facility] germline mutation testing pending  5. MRI cervical spine (without IV contrast) reviewed with the patient today which does document a C7 metastatic lesion.  She is asymptomatic, therefore we will defer radiation at this time  6. We will proceed with ultrasound of the thyroid  7. The patient continues on hemodialysis Tuesday and Saturday (2 days/week currently)  8. Nephrology is currently managing the patient's anemia, patient receiving Epogen with dialysis as well as intermittent IV iron.  9. Continue oral folic acid 1 mg daily, B12  1000 mcg daily.  10. Follow-up with supportive oncology clinic as scheduled  11. Patient is scheduled for follow-up visit with urology on 11/22/2021  12. MD follow-up 12/3/2021 with CBC, CMP.  She cannot receive day 15 Faslodex at that time as it is too soon  13. Return Monday, 12/6/2021 for CBC and Faslodex injection  14. The patient wishes to maintain appointments at the [facility], therefore we will need to transition her to either Wednesday or Friday with Dr. Irvin.  When she is due for cycle 2-day 1 Faslodex, we could delay this to Wednesday, 12/22/2021 and then be continued every 28 days.  This will be further discussed at follow-up with Dr. Irvin     Patient continues on high risk medication requiring intensive monitoring.  His plan of care was discussed and reviewed with Dr. Irvin who is in agreement    I spent 50 minutes caring for Juana on this date of service. This time includes time spent by me in the following activities: preparing for the visit, reviewing tests, obtaining and/or reviewing a separately obtained history, performing a medically appropriate examination and/or evaluation, counseling and educating the patient/family/caregiver, ordering medications, tests, or procedures, referring and communicating with other health care professionals, documenting information in the medical record and care coordination.     Annabelle Atwood, APRN  11/22/2021

## 2021-11-23 ENCOUNTER — TELEPHONE (OUTPATIENT)
Dept: ONCOLOGY | Facility: CLINIC | Age: 61
End: 2021-11-23

## 2021-11-26 ENCOUNTER — HOSPITAL ENCOUNTER (OUTPATIENT)
Dept: ULTRASOUND IMAGING | Facility: HOSPITAL | Age: 61
Discharge: HOME | End: 2021-11-26
Admitting: NURSE PRACTITIONER

## 2021-11-26 ENCOUNTER — HOSPITAL ENCOUNTER (OUTPATIENT)
Dept: INFUSION THERAPY | Facility: HOSPITAL | Age: 61
Setting detail: INFUSION SERIES
Discharge: HOME | End: 2021-11-26

## 2021-11-26 VITALS
RESPIRATION RATE: 18 BRPM | SYSTOLIC BLOOD PRESSURE: 142 MMHG | DIASTOLIC BLOOD PRESSURE: 68 MMHG | HEART RATE: 65 BPM | TEMPERATURE: 97.3 F | OXYGEN SATURATION: 100 %

## 2021-11-26 DIAGNOSIS — D63.1 ANEMIA DUE TO CHRONIC KIDNEY DISEASE, ON CHRONIC DIALYSIS (HCC): ICD-10-CM

## 2021-11-26 DIAGNOSIS — N18.6 ANEMIA DUE TO CHRONIC KIDNEY DISEASE, ON CHRONIC DIALYSIS (HCC): ICD-10-CM

## 2021-11-26 DIAGNOSIS — Z79.899 HIGH RISK MEDICATION USE: ICD-10-CM

## 2021-11-26 DIAGNOSIS — C50.919 METASTATIC BREAST CANCER: ICD-10-CM

## 2021-11-26 DIAGNOSIS — E04.1 THYROID NODULE: ICD-10-CM

## 2021-11-26 DIAGNOSIS — Z99.2 ANEMIA DUE TO CHRONIC KIDNEY DISEASE, ON CHRONIC DIALYSIS (HCC): ICD-10-CM

## 2021-11-26 LAB
ABO GROUP BLD: NORMAL
BLD GP AB SCN SERPL QL: NEGATIVE
RH BLD: POSITIVE
T&S EXPIRATION DATE: NORMAL

## 2021-11-26 PROCEDURE — 76536 US EXAM OF HEAD AND NECK: CPT

## 2021-11-26 PROCEDURE — 86901 BLOOD TYPING SEROLOGIC RH(D): CPT | Performed by: NURSE PRACTITIONER

## 2021-11-26 PROCEDURE — 86923 COMPATIBILITY TEST ELECTRIC: CPT

## 2021-11-26 PROCEDURE — 86900 BLOOD TYPING SEROLOGIC ABO: CPT

## 2021-11-26 PROCEDURE — 86850 RBC ANTIBODY SCREEN: CPT | Performed by: NURSE PRACTITIONER

## 2021-11-26 PROCEDURE — 36430 TRANSFUSION BLD/BLD COMPNT: CPT

## 2021-11-26 PROCEDURE — 86900 BLOOD TYPING SEROLOGIC ABO: CPT | Performed by: NURSE PRACTITIONER

## 2021-11-26 PROCEDURE — 36415 COLL VENOUS BLD VENIPUNCTURE: CPT

## 2021-11-26 PROCEDURE — P9016 RBC LEUKOCYTES REDUCED: HCPCS

## 2021-11-26 PROCEDURE — 63710000001 DIPHENHYDRAMINE PER 50 MG: Performed by: NURSE PRACTITIONER

## 2021-11-26 RX ORDER — DIPHENHYDRAMINE HCL 25 MG
25 CAPSULE ORAL ONCE
Status: COMPLETED | OUTPATIENT
Start: 2021-11-26 | End: 2021-11-26

## 2021-11-26 RX ORDER — SODIUM CHLORIDE 9 MG/ML
250 INJECTION, SOLUTION INTRAVENOUS AS NEEDED
Status: DISCONTINUED | OUTPATIENT
Start: 2021-11-26 | End: 2021-11-28 | Stop reason: HOSPADM

## 2021-11-26 RX ADMIN — DIPHENHYDRAMINE HYDROCHLORIDE 25 MG: 25 CAPSULE ORAL at 08:38

## 2021-11-30 ENCOUNTER — APPOINTMENT (OUTPATIENT)
Dept: ULTRASOUND IMAGING | Facility: HOSPITAL | Age: 61
End: 2021-11-30

## 2021-12-02 LAB — REF LAB TEST RESULTS: NORMAL

## 2021-12-02 NOTE — PROGRESS NOTES
Chief Complaint  Metastatic lobular breast cancer (ER/IA positive, HER-2/tj negative), anemia secondary to CKD3, CHF, peripheral neuropathy, chemotherapy related nausea chemotherapy-induced myelosuppression    Subjective        History of Present Illness  The patient returns today in follow-up continuing currently on Faslodex and Ibrance.  She will be due for cycle 1 day 15 Faslodex on 12/6/2021.  She is currently in the midst of a week of Ibrance, taking this at 100 mg daily for 7 days on followed by 7 days off.  She will begin a week off Ibrance on 12/6/2021.  She is tolerating treatment very well with no significant side effects.  She also continues on hemodialysis only 2 days/week currently on Tuesdays and Saturdays.  There are potential plans to further wean her off of dialysis in the future.  She is receiving Epogen with dialysis as well.  She did notice significant improvement in her fatigue after recent PRBC transfusion.  She has undergone a number of studies since I saw her last with MRI cervical spine 11/18/2021 and thyroid ultrasound 11/26/2021 which we are reviewing today and also had [facility] germline panel test sent which we are reviewing as well.  She was seen by urology on 11/22/2021 and notes that there are plans for left ureteral stent placement on 12/15/2021.  She was also seen by supportive oncology, Zoloft dose was increased and prescription sent for armodafinil.  Apparently there is difficulty with insurance coverage however of the armodafinil and she is going to be contacting supportive oncology to discuss this today.  She remains today in her motorized scooter as she has in the past.  She has no new complaints.  She denies any abdominal pain or bloating.  She notes normal bowel function.  As above fatigue has improved after her recent transfusion.  She is accompanied today by her .       Objective   Vital Signs:   /45   Pulse 63   Temp 97.9 °F (36.6 °C) (Oral)   Resp 18   Ht  "170.2 cm (67.01\")   Wt (!) 161 kg (354 lb) Comment: states  SpO2 97%   BMI 55.43 kg/m²     Physical Exam  Constitutional:       Appearance: She is well-developed. She is obese.      Comments: Patient is in a motorized scooter today   Eyes:      Conjunctiva/sclera: Conjunctivae normal.   Neck:      Thyroid: No thyromegaly.   Cardiovascular:      Rate and Rhythm: Normal rate and regular rhythm.      Heart sounds: Murmur heard.   No friction rub. No gallop.       Comments: 2/6 murmur  Pulmonary:      Effort: No respiratory distress.      Breath sounds: Normal breath sounds.      Comments: Dialysis access in place in the right subclavian position  Chest:   Breasts:      Right: No supraclavicular adenopathy.      Left: No supraclavicular adenopathy.       Abdominal:      General: Bowel sounds are normal. There is no distension.      Palpations: Abdomen is soft.      Tenderness: There is no abdominal tenderness.   Musculoskeletal:         General: Swelling present.      Comments: Trace bilateral lower extremity edema   Lymphadenopathy:      Head:      Right side of head: No submandibular adenopathy.      Cervical: No cervical adenopathy.      Upper Body:      Right upper body: No supraclavicular adenopathy.      Left upper body: No supraclavicular adenopathy.   Skin:     General: Skin is warm and dry.      Findings: No bruising or rash.   Neurological:      Mental Status: She is alert and oriented to person, place, and time.      Cranial Nerves: No cranial nerve deficit.      Motor: No abnormal muscle tone.      Deep Tendon Reflexes: Reflexes normal.   Psychiatric:         Behavior: Behavior normal.        Patient was examined today, unchanged from above    Result Review : Reviewed CBC, CMP from today.  Reviewed MRI cervical spine 11/18/2021, thyroid ultrasound 11/26/2021, supportive oncology progress note, [facility] genetic panel test from 11/12/2021.       Assessment and Plan    1. Metastatic lobular breast cancer " (ER/WV positive, HER-2/tj negative):  · History of stage IIIC right breast cancer ([id number])  · Patient treated previously in the [facility] system  · Diagnosis via excisional biopsy 8/23/2003 with lobular carcinoma, 4.5 cm, positive margins.  ER/WV positive, HER-2/tj negative.  · Staging evaluation in September 2003 with negative bone scan and negative CT scans.  Ejection fraction 65%.  · Received neoadjuvant chemotherapy (? GET regimen) which apparently included Taxotere and possibly epirubicin.  Received 6 cycles.  · 1/22/2004 bilateral mastectomy with right axillary dissection.  Per available records, 10-12 cm residual invasive lobular carcinoma in the breast with 14/16 lymph nodes positive with extranodal extension.  Immediate reconstruction.  · Received adjuvant chemotherapy with carboplatin and navelbine x4 cycles  · Initiated adjuvant tamoxifen 6/22/2004  · Adjuvant radiation therapy initiated but interrupted due to chest wall cellulitis requiring removal of implants in August 2004  · Implant reconstruction again attempted with MRSA infection, implant removed 12/30/2005 with no further attempts made and reconstruction.  · Completed 5 years tamoxifen in June 2009 and subsequently transitioned to Femara.  Received Femara until June 2014.  · Patient experienced postmenopausal vaginal bleeding in 2013, negative endometrial biopsy and gynecologic evaluation.  · Patient last seen in [facility] 6/18/2014  · CT chest performed to evaluate bicuspid aortic valve and dilated asending aorta 7/12/2019.  CT showed no change in aorta however showed new free intraperitoneal fluid in the upper abdomen with mesenteric edema, concerning for carcinomatosis.  · CT abdomen and pelvis (without IV contrast) on 7/16/2019 with mesenteric thickening, small volume of complex fluid in the mesentery which was suspicious and possibly representative of carcinomatosis, small amount of perihepatic fluid, small bowel loops tethered to  omentum without obstruction, omental thickening, shotty retroperitoneal and pelvic lymph nodes (non-pathologically enlarged).  · PET scan 7/26/2019 with hypermetabolic omental activity, hypermetabolic small retroperitoneal lymph nodes, hypermetabolic activity throughout uterus with increase in size.  · CT-guided omental biopsy 7/30/2019 with metastatic lobular carcinoma of breast primary, ER positive (greater than 95%), SC positive (90%), HER-2/tj negative (1+ IHC)  · Baseline CA 15-3 on 7/22/19 was 32.6  · Initiation of Aromasin 25 mg daily 8/12/2019.  Initiated Ibrance at 100 mg 21/28 days on 8/26/2019.  · Improvement in CA 15-3 on 9/17/19-26.3  · Following 2 cycles of Aromasin and Ibrance, CA 15-3 declined to 25.9 on 10/15/2019.  CT scan chest abdomen pelvis 10/16/2019 showed improvement in the mesenteric and omental thickening and near resolution of complex ascites.  Treatment continued.  · Stable findings on subsequent CT chest abdomen pelvis 12/11/2019.  Further improvement in CA 15-3-23.9 on 12/18/2019.  Ibrance/Aromasin continued.   · [facility] liquid biopsy 2/5/2020: MSI undetermined, mutations in BETH, NF1, CHEK2.  No evidence of PIK3CA mutation.  · Subsequent [facility] germline testing 11/12/2021 with BETH VUS (c.2864C>A) and POLE VUS (c.631A>T)  · Slight increase in CA 15-3 to 27.1 on 2/19/2020 and 29.1 on 3/18/2020.  CT however on 3/13/2020 with no new findings.  · Subsequent improvement in CA 15-3-26.5 on 5/15/2020  · CT 7/31/2020 showed evidence of stable disease.  CA 15-3 declined further to 23.1 on 7/24/2020.  · CT 10/19/2020 with stable disease.  Fluctuations in CA 15-3 of unclear significance.  · CA 15-3 on 1/6/2021 decreased to 23.  CT chest abdomen pelvis 1/29/2021 with stable findings.  · CA 15-3 on 4/1/2021 was 28.4  · CT 4/22/2021 with no evidence of progressive malignancy, notation of new focal linear subpleural opacification right upper lobe felt to be infectious and/or inflammatory.   Enlargement of 2 staghorn calculi left kidney.  Persistent left perinephric stranding.    · Hospitalization 4/29-5/4/2021 with RYAN/CKD, UTI, anemia.  Required 2 unit PRBC transfusion and initiated hemodialysis Tuesday Thursday Saturday.  Brief interruption in Ibrance during hospitalization.  · CA 15-3 on 5/27/2021 was 27.8.  · CT chest abdomen pelvis 7/2/2021 showed no evidence of progression or new metastatic disease.  There was only one remaining left renal stone which was smaller than previous exam.  Stranding surrounding both kidneys left greater than right with left hydronephrosis and hydroureter.  · CA 15-3 7/7/2021 was 25.3  · Patient experienced severe ongoing anemia felt to be in part related to Ibrance requiring transfusion support intermittently.  Ibrance dose decreased on 8/23/2020 1 to 100 mg daily for 7 days on followed by 7 days off.  · Stable CA 15-3 on 9/1/2021 at 25.5 and again 10/13/2021 at 25.6.  · CT chest abdomen pelvis 10/26/2021 with increase in left hydronephrosis with increase in left perinephric and periureteral stranding. Decrease in stone in the left kidney lower pole (7 mm).  Stable small retroperitoneal lymph and left periaortic lymph nodes.  · PET scan 11/10/2021 with multiple bilateral hypermetabolic nodular pulmonary lesions, asymmetric moderate to severe left hydronephrosis with ill-defined fat stranding and soft tissue thickening in the renal sinus and surrounding the left ureter, hypermetabolic left retroperitoneal lymph node with slight increase in size, ill-defined hypermetabolic thickening in the inferior omentum with increase in size, uptake and C7 (indeterminate, recommended MRI).  Short segments of uptake in the mid and distal esophagus possibly related to gastritis.  · PET scan findings felt to be consistent with progressive malignancy.  Patient declined repeat omental biopsy.   · Options for further treatment discussed on 11/12/2021.  In light of renal failure and  dialysis, options are more limited.  Recommendation to continue Ibrance and discontinue Aromasin, begin Faslodex.  Discussed possible future use of single agent Taxol.    · Aromasin discontinued 11/12/2021  · On 11/22/2021 initiation of Faslodex with continuation of Ibrance (100 mg daily for 7 days on followed by 7 days off).  · MRI cervical spine 11/18/2021 showed the right C7 normality to likely represent metastatic disease.  With solitary bone lesion, did not recommend pursuit of bone modifying agent.  · The patient returns today in follow-up continuing on Faslodex and Ibrance.  She is due for cycle 1 day 15 Faslodex 500 mg IM on 12/6/2021.  She is currently receiving a week of Ibrance 100 mg daily and will begin a week off 12/6/2021 (Ibrance dosed at 100 mg daily 7 days on followed by 7 days off).  She is doing well on current treatment.  She continues with significant anemia that is multifactorial, hemoglobin today 7.5 and we are pursuing additional transfusion 1 unit PRBC (see below).  She has seen urology and Dr. Mckee will be placing left ureteral stent on 12/15/2021.  She was seen by supportive oncology and increased her Zoloft dose from 15 up to 100 mg daily.  There was plan to begin armodafinil 50 mg daily however there is question regarding insurance coverage.  Currently trying to work through this.  She notes ongoing fatigue although reports fatigue did improve after her last transfusion.  She continues on dialysis every Tuesday and Saturday and there has been discussion regarding further tapering of her dialysis schedule.  She does receive Epogen with dialysis.  We reviewed a number of studies today including MRI C-spine 11/18/2021 which evaluated an abnormality at C7 seen on PET scan.  It does appear that this area is likely metastasis.  She is asymptomatic however.  We discussed that with a solitary site of bone involvement, would not necessarily pursue a bone modifying agent, particularly in  relation to her ongoing dialysis.  There was an additional area of activity at C5-6 however this appeared to be related to degenerative change.  There was also a right thyroid cyst which would be discussed further below.  We did pursue thyroid ultrasound to evaluate further.  We also reviewed her [facility] panel test, obtained due to previous liquid biopsy showing BETH mutation.  This showed VUS in BETH and POLE.  We will proceed on with treatment as planned with Faslodex on 12/6/2021.  She does wish to continue treatment at present facility and therefore we will switch to Tuesdays in the future and she will have nurse practitioner visit on 12/21/2021 when she will receive cycle 2-day 1 Faslodex 500 mg IM at which time we will also recheck CA 15-3. I will see her back on 1/18/2021 when she is due for cycle 3-day 1 Faslodex and just prior to this the week of 1/10/2021 she will undergo follow-up PET scan to assess response.  Plan to see her back  2. Anemia secondary to ESRD, exacerbated by Ibrance:  · The patient appears to have a chronic anemia with hemoglobin in the 10-11 range with normal MCV.  · Patient has underlying CKD3 with baseline creatinine recently in the 1.5-1.8 range.  · Anemia evaluation 7/22/2019 with iron 30, ferritin 85.2, iron saturation 10%, TIBC 311, B12 370, erythropoietin level 20.4  · Anemia felt in large part to be related to CKD3 as well as to underlying malignancy  · Evidence of folate deficiency 8/12/2019 with level 3.84, initiated folic acid 1 mg daily  · Additional labs on 12/18/2019 with iron 73, ferritin 82.2, iron saturation 25%, TIBC 290.  We did discuss the potential use of Procrit if her hemoglobin declines into the low 9/upper 8 range.  · Decline in hemoglobin into the 8 range, iron studies 7/20/2020 with iron 54, ferritin 66.7, iron saturation 16%, TIBC 328.  On 8/7/2020 proceeded with Injectafer 750 mg IV x1 dose.  Second dose not administered due to onset of fever, subsequent iron  studies precluded further administration of IV iron.  · Initiated Procrit 20,000 units every 4 weeks on 9/4/2020.  · On 1/6/2021, hemoglobin declined significantly to 7.1.  This was repeated and verified at 7.2.  No evidence of GI blood loss, stool cards negative for occult blood x3 on 1/12/2021.  Additional labs on 1/6/2021 with iron 47, ferritin 430, iron saturation 20%, TIBC 235, folate greater than 20, B12 324, haptoglobin 525, .  · Transfused 2 unit PRBC on 1/7/2021  · Change in Procrit dosing to weekly  · Due to low normal B12 level initiated oral B12 1000 mcg daily.  · Patient with symptomatic decline in hemoglobin to 7.7 on 4/1/2021, received 1 unit PRBC transfusion  · Hospitalization 4/29-5/4/2021 with RYAN/CKD, UTI, anemia.  Required 2 unit PRBC transfusion and initiated hemodialysis Tuesday Thursday Saturday.  · Transfused 2 units PRBC for hemoglobin 7.2 on 5/27/2021  · Following initiation of hemodialysis, management of BEAU transitioned to nephrology, receiving Epogen with dialysis.  · On 7/21/2021, decline in hemoglobin to 6.5 requiring additional transfusion support.  On 7/21/2021, additional anemia evaluation with iron 17, ferritin 646, iron saturation 9%, TIBC 193, folate greater than 20, B12 690.  Contacted Dr. Antonio in nephrology and confirmed that she was receiving maximum dose Epogen with dialysis.   · Patient received additional transfusion with hemoglobin down to 7.4 on 8/18/2021.  · Ongoing transfusion support prompted alteration in Ibrance dosing on 8/23/2021 as outlined above.  · After alteration in Ibrance dosing, hemoglobin improved and remained stable in the 9-10 range.  · Subsequent worsening of anemia with hemoglobin down to 7.4 on 10/13/2021 requiring transfusion 2 unit PRBC  · Additional labs 10/27/2021 with iron 14, ferritin seven or 97.4, iron saturation 7%, TIBC 213, folate greater than 20, CRP 18.06, B12 1452. Consistent with anemia secondary to chronic disease/malignancy  and ESRD  · Stool negative for occult blood x3 on 11/2/2021  · Hemoglobin 7.4 on 11/22/2021, transfuse 1 unit PRBC  · Hemoglobin today stable at 7.5 following recent transfusion.  Patient notes improvement in fatigue after transfusion.  She does continue receiving Epogen and intermittent IV iron from nephrology with dialysis.  With decline in dialysis frequency to 2 days/week, Epogen dosing is also decreased to 2 days/week possibly accounting for decline in her hemoglobin recently.  We will proceed with 1 unit PRBC transfusion again.  We will continue monitoring CBC every 2 weeks with additional transfusion support as needed.  3. ESRD on HD:  · Baseline creatinine recently in 1.6-2.0 range  · On 9/10/2019, RYAN/CKD3 with creatinine up to 2.44, BUN of 40.  Received 1 L normal saline.  Patient with increase in oral fluid intake.  · Renal ultrasound 9/20/2019 with no evidence of obstructive uropathy.  · Diminished oral intake due to nausea from Ibrance, creatinine 2.79 with BUN 43 on 10/15/2019.  Received 1 L normal saline.  Repeat labs on 10/18/2019 with BUN 31, creatinine 2.0.  · During cycle 3 Ibrance, patient developed increase in creatinine on 11/6/2019 to 2.35 and received 1 L normal saline.  · Patient is trying to maintain adequate oral hydration.    · Gradual escalation of creatinine into the 2-2.4 range and subsequently into the 2.5-2.8 range  · Creatinine increased into the low 3 range in early January 2021.  Patient increased oral hydration with improvement back into the mid 2 range.  · Hospitalization 4/29-5/4/2021 with RYAN/CKD, UTI, anemia.  Required 2 unit PRBC transfusion and initiated hemodialysis Tuesday Thursday Saturday.   · Patient reports recent decrease in frequency of dialysis to twice per week on Tuesdays and Saturdays.  She continues to produce a significant amount of urine.    · Status post left upper extremity AV fistula placement on 11/3/2021 by vascular surgery  · Patient reports there has  been consideration of further tapering of her dialysis schedule.  4. CHF with mild to moderate aortic stenosis:  · Recent cardiology evaluation with echocardiogram 7/12/2019 showing ejection fraction 48% with moderate dilation of the LV cavity, global hypokinesis, grade 2 diastolic dysfunction, mild to moderate aortic stenosis, trivial pericardial effusion.  · CT chest 7/12/2019 with no change in the aorta compared to prior study 12/13/2017.  · Echocardiogram 7/19/2021 with ejection fraction 56%, left atrial volume moderately increased, grade 2 diastolic dysfunction, severe calcification aortic valve, moderate aortic stenosis, severe mitral calcification, mild mitral stenosis, marked elevation in RVSP from tricuspid regurgitation.  · Patient notes that she discussed echo results with cardiology and valvular status was felt to be stable in regards to aortic stenosis.  5. Left upper and bilateral lower extremity peripheral neuropathy:  · Patient reports that left upper extremity neuropathy was not present from previous chemotherapy but occurred after hospitalization that required intubation and critical care stay.  Apparently felt to represent critical care neuropathy.  Improved over time.  · Bilateral lower extremity neuropathy felt to be related to diabetes  · May have future implications regarding treatment for breast cancer.  · Patient reports that peripheral neuropathy symptoms continue in the bilateral lower extremities, unchanged  6. Uterine/endometrial activity on PET scan:  · Patient experienced postmenopausal vaginal bleeding in 2013, negative endometrial biopsy and gynecologic evaluation.  · With findings on PET scan with enlarging uterus and some hypermetabolic activity, referred back to Dr. Oliveros and gynecology.    · 9/19/2019 D&C with hysteroscopy by Dr. Oliveros, no significant intraoperative findings, pathologic results with benign findings, no evidence of malignancy.  7. Nausea:  · Mild, relieved with  Zofran.   · Patient experienced improvement in nausea after initiating hemodialysis  8. Myelosuppression secondary to Ibrance:  · With cycle 1, jessica WBC 2.49 with ANC 1.25 and platelet count 140,000.  · With cycle 2, jessica WBC 2.33 with ANC 1.06, maintained normal platelet count  · Today, WBC 3.57 with ANC 2.49, platelet count 235,000  9. Depression  · Patient with history of depression, worsened after the death of her son in November 2021  · With evidence of progressive malignancy in November 2021, patient agreeable to referral to supportive oncology  · Patient was seen in supportive oncology 11/18/2021, Zoloft increased from 50 up to 100 mg daily.  Prescribed armodafinil 50 mg daily however there are issues with insurance coverage  · Patient reports that her symptoms have improved.  10. Left perinephric stranding secondary to malignancy with hydronephrosis:  · CT 4/22/2021 showed interval enlargement of 2 staghorn calculi in the left kidney with persistent left perinephric stranding  · Hospitalization 4/29-5/4/2021 with RYAN/CKD, UTI, anemia.  Required 2 unit PRBC transfusion and initiated hemodialysis Tuesday Thursday Saturday.    · Discussion from urology regarding potential need for surgical procedure to address staghorn calculus left kidney  · CT scan 7/2/2021 with only 1 remaining left renal stone identified which had decreased in size.  Patient appears to have passed the other stone.  · As above, CT 10/26/2021 with more prominent left hydronephrosis and increase in left perinephric and periureteral stranding.  Felt to possibly be related to passage of recent stone versus partial obstruction secondary to debris or thrombus in the left ureter versus pyelonephritis.  Patient however with no clinical evidence of recent stone passage nor pyelonephritis. Malignancy felt to be the cause of CT changes around the left kidney at this point.   · Patient was seen by urology, plans for left ureteral stent placement on  12/15/2021.  11. Right thyroid nodules  · Patient underwent prior thyroid biopsy by her report with benign findings many years ago, records not available  · On MRI cervical spine 11/18/2021, finding of 1.8 cm right thyroid nodule  · Thyroid ultrasound 11/26/2021 with right-sided thyroid nodules, 1 nodule was hypoechoic, solid measuring 2 cm with biopsy recommended.  · We reviewed the scan results today.  In light of her metastatic breast cancer, renal failure the significance of the thyroid nodule is felt to be much less.  We discussed possibility of thyroid biopsy however patient is inclined to forego this, especially since she has had a biopsy performed in the past with benign findings by her report.  We will monitor lesion on follow-up PET scan.    Plan:  1. Continue Ibrance 100 mg daily for 7 days on followed by 7 days off (currently on treatment, will start off week 12/6/2021).  2. Arrange transfusion 1 unit PRBC  3. Return on Monday, 12/6/2021 for cycle 1 day 15 Faslodex 500 mg IM  4. The patient continues on hemodialysis Tuesday and Saturday (2 days/week currently)  5. Nephrology is currently managing the patient's anemia, patient receiving Epogen with dialysis as well as intermittent IV iron.  6. Continue oral folic acid 1 mg daily, B12 1000 mcg daily.  7. The patient continues follow-up in the supportive oncology clinic.  Insurance has not approved use of armodafinil, patient to discuss this with supportive oncology.  8. Patient will be undergoing left ureteral stent placement on 12/15/2021  9. On 12/21/2021 nurse practitioner visit with CBC, CMP, CA 15-3 and cycle 2-day 1 Faslodex 500 mg IM  10. On 1/4/2021 CBC with RN review  11. Week of 1/10/2021 PET scan  12. MD visit on 1/18/2021 with CBC, CMP and Faslodex pending PET scan results.    Patient continues on high risk medication requiring intensive monitoring.    I did spend 40 minutes with the patient today, time spent in review of records, face-to-face  consultation, coordination of care, placement of orders, completion of documentation.

## 2021-12-03 ENCOUNTER — OFFICE VISIT (OUTPATIENT)
Dept: ONCOLOGY | Facility: CLINIC | Age: 61
End: 2021-12-03

## 2021-12-03 ENCOUNTER — APPOINTMENT (OUTPATIENT)
Dept: ONCOLOGY | Facility: HOSPITAL | Age: 61
End: 2021-12-03

## 2021-12-03 ENCOUNTER — LAB (OUTPATIENT)
Dept: LAB | Facility: HOSPITAL | Age: 61
End: 2021-12-03

## 2021-12-03 ENCOUNTER — TELEPHONE (OUTPATIENT)
Dept: PSYCHIATRY | Facility: HOSPITAL | Age: 61
End: 2021-12-03

## 2021-12-03 VITALS
BODY MASS INDEX: 45.99 KG/M2 | SYSTOLIC BLOOD PRESSURE: 108 MMHG | DIASTOLIC BLOOD PRESSURE: 45 MMHG | RESPIRATION RATE: 18 BRPM | TEMPERATURE: 97.9 F | WEIGHT: 293 LBS | HEART RATE: 63 BPM | HEIGHT: 67 IN | OXYGEN SATURATION: 97 %

## 2021-12-03 DIAGNOSIS — N18.6 ANEMIA DUE TO CHRONIC KIDNEY DISEASE, ON CHRONIC DIALYSIS (HCC): Primary | ICD-10-CM

## 2021-12-03 DIAGNOSIS — C50.919 METASTATIC BREAST CANCER: Primary | ICD-10-CM

## 2021-12-03 DIAGNOSIS — D63.1 ANEMIA DUE TO CHRONIC KIDNEY DISEASE, ON CHRONIC DIALYSIS (HCC): Primary | ICD-10-CM

## 2021-12-03 DIAGNOSIS — N18.6 ANEMIA DUE TO CHRONIC KIDNEY DISEASE, ON CHRONIC DIALYSIS (HCC): ICD-10-CM

## 2021-12-03 DIAGNOSIS — C50.919 METASTATIC BREAST CANCER: ICD-10-CM

## 2021-12-03 DIAGNOSIS — Z99.2 ANEMIA DUE TO CHRONIC KIDNEY DISEASE, ON CHRONIC DIALYSIS (HCC): ICD-10-CM

## 2021-12-03 DIAGNOSIS — D63.1 ANEMIA DUE TO CHRONIC KIDNEY DISEASE, ON CHRONIC DIALYSIS (HCC): ICD-10-CM

## 2021-12-03 DIAGNOSIS — Z99.2 ANEMIA DUE TO CHRONIC KIDNEY DISEASE, ON CHRONIC DIALYSIS (HCC): Primary | ICD-10-CM

## 2021-12-03 LAB
ABO GROUP BLD: NORMAL
ALBUMIN SERPL-MCNC: 3 G/DL (ref 3.5–5.2)
ALBUMIN/GLOB SERPL: 0.7 G/DL (ref 1.1–2.4)
ALP SERPL-CCNC: 119 U/L (ref 38–116)
ALT SERPL W P-5'-P-CCNC: 12 U/L (ref 0–33)
ANION GAP SERPL CALCULATED.3IONS-SCNC: 10.5 MMOL/L (ref 5–15)
AST SERPL-CCNC: 14 U/L (ref 0–32)
BASOPHILS # BLD AUTO: 0.09 10*3/MM3 (ref 0–0.2)
BASOPHILS NFR BLD AUTO: 2.5 % (ref 0–1.5)
BILIRUB SERPL-MCNC: 0.6 MG/DL (ref 0.2–1.2)
BLD GP AB SCN SERPL QL: NEGATIVE
BUN SERPL-MCNC: 43 MG/DL (ref 6–20)
BUN/CREAT SERPL: 10.2 (ref 7.3–30)
CALCIUM SPEC-SCNC: 8.4 MG/DL (ref 8.5–10.2)
CHLORIDE SERPL-SCNC: 103 MMOL/L (ref 98–107)
CO2 SERPL-SCNC: 23.5 MMOL/L (ref 22–29)
CREAT SERPL-MCNC: 4.21 MG/DL (ref 0.6–1.1)
DEPRECATED RDW RBC AUTO: 66.4 FL (ref 37–54)
EOSINOPHIL # BLD AUTO: 0.08 10*3/MM3 (ref 0–0.4)
EOSINOPHIL NFR BLD AUTO: 2.2 % (ref 0.3–6.2)
ERYTHROCYTE [DISTWIDTH] IN BLOOD BY AUTOMATED COUNT: 17.7 % (ref 12.3–15.4)
GFR SERPL CREATININE-BSD FRML MDRD: 11 ML/MIN/1.73
GFR SERPL CREATININE-BSD FRML MDRD: ABNORMAL ML/MIN/{1.73_M2}
GLOBULIN UR ELPH-MCNC: 4.4 GM/DL (ref 1.8–3.5)
GLUCOSE SERPL-MCNC: 176 MG/DL (ref 74–124)
HCT VFR BLD AUTO: 25 % (ref 34–46.6)
HGB BLD-MCNC: 7.5 G/DL (ref 12–15.9)
IMM GRANULOCYTES # BLD AUTO: 0.03 10*3/MM3 (ref 0–0.05)
IMM GRANULOCYTES NFR BLD AUTO: 0.8 % (ref 0–0.5)
LYMPHOCYTES # BLD AUTO: 0.64 10*3/MM3 (ref 0.7–3.1)
LYMPHOCYTES NFR BLD AUTO: 17.9 % (ref 19.6–45.3)
MCH RBC QN AUTO: 30.5 PG (ref 26.6–33)
MCHC RBC AUTO-ENTMCNC: 30 G/DL (ref 31.5–35.7)
MCV RBC AUTO: 101.6 FL (ref 79–97)
MONOCYTES # BLD AUTO: 0.24 10*3/MM3 (ref 0.1–0.9)
MONOCYTES NFR BLD AUTO: 6.7 % (ref 5–12)
NEUTROPHILS NFR BLD AUTO: 2.49 10*3/MM3 (ref 1.7–7)
NEUTROPHILS NFR BLD AUTO: 69.9 % (ref 42.7–76)
NRBC BLD AUTO-RTO: 0 /100 WBC (ref 0–0.2)
PLATELET # BLD AUTO: 235 10*3/MM3 (ref 140–450)
PMV BLD AUTO: 9.2 FL (ref 6–12)
POTASSIUM SERPL-SCNC: 4.8 MMOL/L (ref 3.5–4.7)
PROT SERPL-MCNC: 7.4 G/DL (ref 6.3–8)
RBC # BLD AUTO: 2.46 10*6/MM3 (ref 3.77–5.28)
RH BLD: POSITIVE
SODIUM SERPL-SCNC: 137 MMOL/L (ref 134–145)
T&S EXPIRATION DATE: NORMAL
WBC NRBC COR # BLD: 3.57 10*3/MM3 (ref 3.4–10.8)

## 2021-12-03 PROCEDURE — 80053 COMPREHEN METABOLIC PANEL: CPT

## 2021-12-03 PROCEDURE — 36415 COLL VENOUS BLD VENIPUNCTURE: CPT

## 2021-12-03 PROCEDURE — 86850 RBC ANTIBODY SCREEN: CPT | Performed by: INTERNAL MEDICINE

## 2021-12-03 PROCEDURE — 86901 BLOOD TYPING SEROLOGIC RH(D): CPT | Performed by: INTERNAL MEDICINE

## 2021-12-03 PROCEDURE — 85025 COMPLETE CBC W/AUTO DIFF WBC: CPT

## 2021-12-03 PROCEDURE — 86923 COMPATIBILITY TEST ELECTRIC: CPT

## 2021-12-03 PROCEDURE — 86900 BLOOD TYPING SEROLOGIC ABO: CPT | Performed by: INTERNAL MEDICINE

## 2021-12-03 PROCEDURE — 99215 OFFICE O/P EST HI 40 MIN: CPT | Performed by: INTERNAL MEDICINE

## 2021-12-03 RX ORDER — ACETAMINOPHEN 325 MG/1
325 TABLET ORAL ONCE
Status: CANCELLED | OUTPATIENT
Start: 2021-12-05 | End: 2021-12-03

## 2021-12-03 RX ORDER — SODIUM CHLORIDE 9 MG/ML
250 INJECTION, SOLUTION INTRAVENOUS AS NEEDED
Status: CANCELLED | OUTPATIENT
Start: 2021-12-05

## 2021-12-03 NOTE — TELEPHONE ENCOUNTER
Supportive Oncology Services    Call placed to patient regarding challenges of obtaining armodafinil; PA has been approved.  Patient aware.

## 2021-12-05 ENCOUNTER — INFUSION (OUTPATIENT)
Dept: ONCOLOGY | Facility: HOSPITAL | Age: 61
End: 2021-12-05

## 2021-12-05 VITALS
RESPIRATION RATE: 16 BRPM | HEART RATE: 61 BPM | DIASTOLIC BLOOD PRESSURE: 59 MMHG | SYSTOLIC BLOOD PRESSURE: 122 MMHG | OXYGEN SATURATION: 100 % | TEMPERATURE: 97.6 F

## 2021-12-05 DIAGNOSIS — Z99.2 ANEMIA DUE TO CHRONIC KIDNEY DISEASE, ON CHRONIC DIALYSIS (HCC): ICD-10-CM

## 2021-12-05 DIAGNOSIS — D63.1 ANEMIA DUE TO CHRONIC KIDNEY DISEASE, ON CHRONIC DIALYSIS (HCC): ICD-10-CM

## 2021-12-05 DIAGNOSIS — N18.6 ANEMIA DUE TO CHRONIC KIDNEY DISEASE, ON CHRONIC DIALYSIS (HCC): ICD-10-CM

## 2021-12-05 PROCEDURE — 36430 TRANSFUSION BLD/BLD COMPNT: CPT

## 2021-12-05 PROCEDURE — 86900 BLOOD TYPING SEROLOGIC ABO: CPT

## 2021-12-05 PROCEDURE — P9016 RBC LEUKOCYTES REDUCED: HCPCS

## 2021-12-05 PROCEDURE — 63710000001 DIPHENHYDRAMINE PER 50 MG: Performed by: INTERNAL MEDICINE

## 2021-12-05 RX ORDER — ACETAMINOPHEN 325 MG/1
325 TABLET ORAL ONCE
Status: DISCONTINUED | OUTPATIENT
Start: 2021-12-05 | End: 2021-12-05 | Stop reason: HOSPADM

## 2021-12-05 RX ORDER — ACETAMINOPHEN 325 MG/1
650 TABLET ORAL ONCE
Status: COMPLETED | OUTPATIENT
Start: 2021-12-05 | End: 2021-12-05

## 2021-12-05 RX ORDER — DIPHENHYDRAMINE HCL 25 MG
25 CAPSULE ORAL ONCE
Status: COMPLETED | OUTPATIENT
Start: 2021-12-05 | End: 2021-12-05

## 2021-12-05 RX ORDER — SODIUM CHLORIDE 9 MG/ML
250 INJECTION, SOLUTION INTRAVENOUS AS NEEDED
Status: DISCONTINUED | OUTPATIENT
Start: 2021-12-05 | End: 2021-12-05 | Stop reason: HOSPADM

## 2021-12-05 RX ADMIN — DIPHENHYDRAMINE HYDROCHLORIDE 25 MG: 25 CAPSULE ORAL at 08:06

## 2021-12-05 RX ADMIN — ACETAMINOPHEN 650 MG: 325 TABLET, FILM COATED ORAL at 08:06

## 2021-12-06 ENCOUNTER — INFUSION (OUTPATIENT)
Dept: ONCOLOGY | Facility: HOSPITAL | Age: 61
End: 2021-12-06

## 2021-12-06 DIAGNOSIS — C50.919 METASTATIC BREAST CANCER: Primary | ICD-10-CM

## 2021-12-06 PROCEDURE — 25010000002 FULVESTRANT PER 25 MG: Performed by: INTERNAL MEDICINE

## 2021-12-06 PROCEDURE — 96402 CHEMO HORMON ANTINEOPL SQ/IM: CPT

## 2021-12-06 RX ADMIN — FULVESTRANT 500 MG: 250 INJECTION INTRAMUSCULAR at 08:24

## 2021-12-09 ENCOUNTER — TRANSCRIBE ORDERS (OUTPATIENT)
Dept: LAB | Facility: HOSPITAL | Age: 61
End: 2021-12-09

## 2021-12-09 DIAGNOSIS — Z01.818 OTHER SPECIFIED PRE-OPERATIVE EXAMINATION: Primary | ICD-10-CM

## 2021-12-13 ENCOUNTER — PRE-ADMISSION TESTING (OUTPATIENT)
Dept: PREADMISSION TESTING | Facility: HOSPITAL | Age: 61
End: 2021-12-13

## 2021-12-13 VITALS
WEIGHT: 293 LBS | RESPIRATION RATE: 24 BRPM | OXYGEN SATURATION: 97 % | DIASTOLIC BLOOD PRESSURE: 65 MMHG | BODY MASS INDEX: 45.99 KG/M2 | TEMPERATURE: 98 F | SYSTOLIC BLOOD PRESSURE: 131 MMHG | HEIGHT: 67 IN | HEART RATE: 66 BPM

## 2021-12-13 LAB
ANION GAP SERPL CALCULATED.3IONS-SCNC: 8 MMOL/L (ref 5–15)
BUN SERPL-MCNC: 27 MG/DL (ref 8–23)
BUN/CREAT SERPL: 8.1 (ref 7–25)
CALCIUM SPEC-SCNC: 8.4 MG/DL (ref 8.6–10.5)
CHLORIDE SERPL-SCNC: 103 MMOL/L (ref 98–107)
CO2 SERPL-SCNC: 24 MMOL/L (ref 22–29)
CREAT SERPL-MCNC: 3.34 MG/DL (ref 0.57–1)
DEPRECATED RDW RBC AUTO: 58.1 FL (ref 37–54)
ERYTHROCYTE [DISTWIDTH] IN BLOOD BY AUTOMATED COUNT: 16.8 % (ref 12.3–15.4)
GFR SERPL CREATININE-BSD FRML MDRD: 14 ML/MIN/1.73
GFR SERPL CREATININE-BSD FRML MDRD: ABNORMAL ML/MIN/{1.73_M2}
GLUCOSE SERPL-MCNC: 115 MG/DL (ref 65–99)
HCT VFR BLD AUTO: 25.8 % (ref 34–46.6)
HGB BLD-MCNC: 8.4 G/DL (ref 12–15.9)
MCH RBC QN AUTO: 31.2 PG (ref 26.6–33)
MCHC RBC AUTO-ENTMCNC: 32.6 G/DL (ref 31.5–35.7)
MCV RBC AUTO: 95.9 FL (ref 79–97)
PLATELET # BLD AUTO: 177 10*3/MM3 (ref 140–450)
PMV BLD AUTO: 9.8 FL (ref 6–12)
POTASSIUM SERPL-SCNC: 4.4 MMOL/L (ref 3.5–5.2)
RBC # BLD AUTO: 2.69 10*6/MM3 (ref 3.77–5.28)
SARS-COV-2 ORF1AB RESP QL NAA+PROBE: NOT DETECTED
SODIUM SERPL-SCNC: 135 MMOL/L (ref 136–145)
WBC NRBC COR # BLD: 2.97 10*3/MM3 (ref 3.4–10.8)

## 2021-12-13 PROCEDURE — U0004 COV-19 TEST NON-CDC HGH THRU: HCPCS | Performed by: NURSE PRACTITIONER

## 2021-12-13 PROCEDURE — C9803 HOPD COVID-19 SPEC COLLECT: HCPCS | Performed by: NURSE PRACTITIONER

## 2021-12-13 PROCEDURE — 85027 COMPLETE CBC AUTOMATED: CPT

## 2021-12-13 PROCEDURE — 80048 BASIC METABOLIC PNL TOTAL CA: CPT

## 2021-12-13 PROCEDURE — 36415 COLL VENOUS BLD VENIPUNCTURE: CPT

## 2021-12-13 NOTE — DISCHARGE INSTRUCTIONS
Take the following medications the morning of surgery with a small sip of water:  AMLODPINE, CARVEDILOL, LEVOTHYROXINE    ARRIVE TO MAIN OR DESK 12-16-21 AT 8:30AM      If you are on prescription narcotic pain medication to control your pain you may also take that medication the morning of surgery.    General Instructions:  • Do not eat or drink anything after midnight the night before surgery.  • Infants may have breast milk up to four hours before surgery.  • Infants drinking formula may drink formula up to six hours before surgery.   • Patients who avoid smoking, chewing tobacco and alcohol for 4 weeks prior to surgery have a reduced risk of post-operative complications.  Quit smoking as many days before surgery as you can.  • Do not smoke, use chewing tobacco or drink alcohol the day of surgery.   • If applicable bring your C-PAP/ BI-PAP machine.  • Bring any papers given to you in the doctor’s office.  • Wear clean comfortable clothes.  • Do not wear contact lenses, false eyelashes or make-up.  Bring a case for your glasses.   • Bring crutches or walker if applicable.  • Remove all piercings.  Leave jewelry and any other valuables at home.  • Hair extensions with metal clips must be removed prior to surgery.  • The Pre-Admission Testing nurse will instruct you to bring medications if unable to obtain an accurate list in Pre-Admission Testing.      Preventing a Surgical Site Infection:  • For 2 to 3 days before surgery, avoid shaving with a razor because the razor can irritate skin and make it easier to develop an infection.    • Any areas of open skin can increase the risk of a post-operative wound infection by allowing bacteria to enter and travel throughout the body.  Notify your surgeon if you have any skin wounds / rashes even if it is not near the expected surgical site.  The area will need assessed to determine if surgery should be delayed until it is healed.  • The night prior to surgery shower using a  fresh bar of anti-bacterial soap (such as Dial) and clean washcloth.  Sleep in a clean bed with clean clothing.  Do not allow pets to sleep with you.  • Shower on the morning of surgery using a fresh bar of anti-bacterial soap (such as Dial) and clean washcloth.  Dry with a clean towel and dress in clean clothing.  • Ask your surgeon if you will be receiving antibiotics prior to surgery.  • Make sure you, your family, and all healthcare providers clean their hands with soap and water or an alcohol based hand  before caring for you or your wound.    Day of surgery:  Your arrival time is approximately two hours before your scheduled surgery time.  Upon arrival, a Pre-op nurse and Anesthesiologist will review your health history, obtain vital signs, and answer questions you may have.  The only belongings needed at this time will be your home medications and if applicable your C-PAP/BI-PAP machine.  A Pre-op nurse will start an IV and you may receive medication in preparation for surgery, including something to help you relax.      Please be aware that surgery does come with discomfort.  We want to make every effort to control your discomfort so please discuss any uncontrolled symptoms with your nurse.   Your doctor will most likely have prescribed pain medications.      If you are going home after surgery you will receive individualized written care instructions before being discharged.  A responsible adult must drive you to and from the hospital on the day of your surgery and stay with you for 24 hours.  Discharge prescriptions can be filled by the hospital pharmacy during regular pharmacy hours.  If you are having surgery late in the day/evening your prescription may be e-prescribed to your pharmacy.  Please verify your pharmacy hours or chose a 24 hour pharmacy to avoid not having access to your prescription because your pharmacy has closed for the day.    If you are staying overnight following surgery, you  will be transported to your hospital room following the recovery period.  [facility] has all private rooms.    If you have any questions please call Pre-Admission Testing at [phone].  Deductibles and co-payments are collected on the day of service. Please be prepared to pay the required co-pay, deductible or deposit on the day of service as defined by your plan.    Patient Education for Self-Quarantine Process    • Following your COVID testing, we strongly recommend that you wear a mask when you are with other people and practice social distancing.   • Limit your activities to only required outings.  • Wash your hands with soap and water frequently for at least 20 seconds.   • Avoid touching your eyes, nose and mouth with unwashed hands.  • Do not share anything - utensils, drinking glasses, food from the same bowl.   • Sanitize household surfaces daily. Include all high touch areas (door handles, light switches, phones, countertops, etc.)    Call your surgeon immediately if you experience any of the following symptoms:  • Sore Throat  • Shortness of Breath or difficulty breathing  • Cough  • Chills  • Body soreness or muscle pain  • Headache  • Fever  • New loss of taste or smell  • Do not arrive for your surgery ill.  Your procedure will need to be rescheduled to another time.  You will need to call your physician before the day of surgery to avoid any unnecessary exposure to hospital staff as well as other patients.

## 2021-12-14 ENCOUNTER — APPOINTMENT (OUTPATIENT)
Dept: LAB | Facility: HOSPITAL | Age: 61
End: 2021-12-14

## 2021-12-16 ENCOUNTER — SPECIALTY PHARMACY (OUTPATIENT)
Dept: PHARMACY | Facility: HOSPITAL | Age: 61
End: 2021-12-16

## 2021-12-16 ENCOUNTER — ANESTHESIA EVENT (OUTPATIENT)
Dept: PERIOP | Facility: HOSPITAL | Age: 61
End: 2021-12-16

## 2021-12-16 ENCOUNTER — APPOINTMENT (OUTPATIENT)
Dept: GENERAL RADIOLOGY | Facility: HOSPITAL | Age: 61
End: 2021-12-16

## 2021-12-16 ENCOUNTER — ANESTHESIA (OUTPATIENT)
Dept: PERIOP | Facility: HOSPITAL | Age: 61
End: 2021-12-16

## 2021-12-16 ENCOUNTER — HOSPITAL ENCOUNTER (OUTPATIENT)
Facility: HOSPITAL | Age: 61
Setting detail: HOSPITAL OUTPATIENT SURGERY
Discharge: HOME | End: 2021-12-16
Attending: UROLOGY | Admitting: UROLOGY

## 2021-12-16 VITALS
TEMPERATURE: 98.2 F | BODY MASS INDEX: 45.99 KG/M2 | HEIGHT: 67 IN | OXYGEN SATURATION: 94 % | SYSTOLIC BLOOD PRESSURE: 130 MMHG | DIASTOLIC BLOOD PRESSURE: 62 MMHG | RESPIRATION RATE: 16 BRPM | HEART RATE: 61 BPM | WEIGHT: 293 LBS

## 2021-12-16 DIAGNOSIS — N13.30 HYDRONEPHROSIS, UNSPECIFIED HYDRONEPHROSIS TYPE: Primary | ICD-10-CM

## 2021-12-16 LAB
ANION GAP SERPL CALCULATED.3IONS-SCNC: 10.7 MMOL/L (ref 5–15)
BUN SERPL-MCNC: 28 MG/DL (ref 8–23)
BUN/CREAT SERPL: 7.2 (ref 7–25)
CALCIUM SPEC-SCNC: 8.5 MG/DL (ref 8.6–10.5)
CHLORIDE SERPL-SCNC: 100 MMOL/L (ref 98–107)
CO2 SERPL-SCNC: 24.3 MMOL/L (ref 22–29)
CREAT SERPL-MCNC: 3.87 MG/DL (ref 0.57–1)
GFR SERPL CREATININE-BSD FRML MDRD: 12 ML/MIN/1.73
GFR SERPL CREATININE-BSD FRML MDRD: ABNORMAL ML/MIN/{1.73_M2}
GLUCOSE BLDC GLUCOMTR-MCNC: 106 MG/DL (ref 70–130)
GLUCOSE BLDC GLUCOMTR-MCNC: 92 MG/DL (ref 70–130)
GLUCOSE SERPL-MCNC: 97 MG/DL (ref 65–99)
POTASSIUM SERPL-SCNC: 4.3 MMOL/L (ref 3.5–5.2)
SODIUM SERPL-SCNC: 135 MMOL/L (ref 136–145)

## 2021-12-16 PROCEDURE — 0 IOTHALAMATE 60 % SOLUTION: Performed by: UROLOGY

## 2021-12-16 PROCEDURE — 25010000002 MIDAZOLAM PER 1 MG: Performed by: ANESTHESIOLOGY

## 2021-12-16 PROCEDURE — C2617 STENT, NON-COR, TEM W/O DEL: HCPCS | Performed by: UROLOGY

## 2021-12-16 PROCEDURE — 25010000002 PROPOFOL 10 MG/ML EMULSION: Performed by: ANESTHESIOLOGY

## 2021-12-16 PROCEDURE — 0 CEFAZOLIN IN DEXTROSE 2-4 GM/100ML-% SOLUTION: Performed by: UROLOGY

## 2021-12-16 PROCEDURE — C1769 GUIDE WIRE: HCPCS | Performed by: UROLOGY

## 2021-12-16 PROCEDURE — 25010000002 SUCCINYLCHOLINE PER 20 MG: Performed by: ANESTHESIOLOGY

## 2021-12-16 PROCEDURE — 80048 BASIC METABOLIC PNL TOTAL CA: CPT | Performed by: UROLOGY

## 2021-12-16 PROCEDURE — 82962 GLUCOSE BLOOD TEST: CPT

## 2021-12-16 PROCEDURE — 74420 UROGRAPHY RTRGR +-KUB: CPT

## 2021-12-16 PROCEDURE — C1758 CATHETER, URETERAL: HCPCS | Performed by: UROLOGY

## 2021-12-16 DEVICE — URETERAL STENT
Type: IMPLANTABLE DEVICE | Site: URETER | Status: FUNCTIONAL
Brand: CONTOUR™

## 2021-12-16 RX ORDER — ROCURONIUM BROMIDE 10 MG/ML
INJECTION, SOLUTION INTRAVENOUS AS NEEDED
Status: DISCONTINUED | OUTPATIENT
Start: 2021-12-16 | End: 2021-12-16 | Stop reason: SURG

## 2021-12-16 RX ORDER — OXYCODONE HYDROCHLORIDE AND ACETAMINOPHEN 5; 325 MG/1; MG/1
1 TABLET ORAL EVERY 6 HOURS PRN
Qty: 12 TABLET | Refills: 0 | Status: SHIPPED | OUTPATIENT
Start: 2021-12-16 | End: 2022-01-05

## 2021-12-16 RX ORDER — SODIUM CHLORIDE, SODIUM LACTATE, POTASSIUM CHLORIDE, CALCIUM CHLORIDE 600; 310; 30; 20 MG/100ML; MG/100ML; MG/100ML; MG/100ML
9 INJECTION, SOLUTION INTRAVENOUS CONTINUOUS
Status: DISCONTINUED | OUTPATIENT
Start: 2021-12-16 | End: 2021-12-16 | Stop reason: HOSPADM

## 2021-12-16 RX ORDER — OXYCODONE AND ACETAMINOPHEN 7.5; 325 MG/1; MG/1
1 TABLET ORAL EVERY 4 HOURS PRN
Status: DISCONTINUED | OUTPATIENT
Start: 2021-12-16 | End: 2021-12-16 | Stop reason: HOSPADM

## 2021-12-16 RX ORDER — PROMETHAZINE HYDROCHLORIDE 25 MG/1
25 SUPPOSITORY RECTAL ONCE AS NEEDED
Status: DISCONTINUED | OUTPATIENT
Start: 2021-12-16 | End: 2021-12-16 | Stop reason: HOSPADM

## 2021-12-16 RX ORDER — DIPHENHYDRAMINE HCL 25 MG
25 CAPSULE ORAL
Status: DISCONTINUED | OUTPATIENT
Start: 2021-12-16 | End: 2021-12-16 | Stop reason: HOSPADM

## 2021-12-16 RX ORDER — HYDRALAZINE HYDROCHLORIDE 20 MG/ML
5 INJECTION INTRAMUSCULAR; INTRAVENOUS
Status: DISCONTINUED | OUTPATIENT
Start: 2021-12-16 | End: 2021-12-16 | Stop reason: HOSPADM

## 2021-12-16 RX ORDER — EPHEDRINE SULFATE 50 MG/ML
5 INJECTION, SOLUTION INTRAVENOUS ONCE AS NEEDED
Status: DISCONTINUED | OUTPATIENT
Start: 2021-12-16 | End: 2021-12-16 | Stop reason: HOSPADM

## 2021-12-16 RX ORDER — FENTANYL CITRATE 50 UG/ML
50 INJECTION, SOLUTION INTRAMUSCULAR; INTRAVENOUS
Status: DISCONTINUED | OUTPATIENT
Start: 2021-12-16 | End: 2021-12-16 | Stop reason: HOSPADM

## 2021-12-16 RX ORDER — MAGNESIUM HYDROXIDE 1200 MG/15ML
LIQUID ORAL AS NEEDED
Status: DISCONTINUED | OUTPATIENT
Start: 2021-12-16 | End: 2021-12-16 | Stop reason: HOSPADM

## 2021-12-16 RX ORDER — HYDROCODONE BITARTRATE AND ACETAMINOPHEN 7.5; 325 MG/1; MG/1
1 TABLET ORAL ONCE AS NEEDED
Status: DISCONTINUED | OUTPATIENT
Start: 2021-12-16 | End: 2021-12-16 | Stop reason: HOSPADM

## 2021-12-16 RX ORDER — DIPHENHYDRAMINE HYDROCHLORIDE 50 MG/ML
12.5 INJECTION INTRAMUSCULAR; INTRAVENOUS
Status: DISCONTINUED | OUTPATIENT
Start: 2021-12-16 | End: 2021-12-16 | Stop reason: HOSPADM

## 2021-12-16 RX ORDER — LIDOCAINE HYDROCHLORIDE 10 MG/ML
0.5 INJECTION, SOLUTION EPIDURAL; INFILTRATION; INTRACAUDAL; PERINEURAL ONCE AS NEEDED
Status: DISCONTINUED | OUTPATIENT
Start: 2021-12-16 | End: 2021-12-16 | Stop reason: HOSPADM

## 2021-12-16 RX ORDER — PROPOFOL 10 MG/ML
VIAL (ML) INTRAVENOUS AS NEEDED
Status: DISCONTINUED | OUTPATIENT
Start: 2021-12-16 | End: 2021-12-16 | Stop reason: SURG

## 2021-12-16 RX ORDER — PROMETHAZINE HYDROCHLORIDE 25 MG/1
25 TABLET ORAL ONCE AS NEEDED
Status: DISCONTINUED | OUTPATIENT
Start: 2021-12-16 | End: 2021-12-16 | Stop reason: HOSPADM

## 2021-12-16 RX ORDER — SODIUM CHLORIDE 0.9 % (FLUSH) 0.9 %
3 SYRINGE (ML) INJECTION EVERY 12 HOURS SCHEDULED
Status: DISCONTINUED | OUTPATIENT
Start: 2021-12-16 | End: 2021-12-16 | Stop reason: HOSPADM

## 2021-12-16 RX ORDER — CLINDAMYCIN PHOSPHATE 600 MG/50ML
600 INJECTION INTRAVENOUS ONCE
Status: COMPLETED | OUTPATIENT
Start: 2021-12-16 | End: 2021-12-16

## 2021-12-16 RX ORDER — MIDAZOLAM HYDROCHLORIDE 1 MG/ML
1 INJECTION INTRAMUSCULAR; INTRAVENOUS
Status: COMPLETED | OUTPATIENT
Start: 2021-12-16 | End: 2021-12-16

## 2021-12-16 RX ORDER — FLUMAZENIL 0.1 MG/ML
0.2 INJECTION INTRAVENOUS AS NEEDED
Status: DISCONTINUED | OUTPATIENT
Start: 2021-12-16 | End: 2021-12-16 | Stop reason: HOSPADM

## 2021-12-16 RX ORDER — SUCCINYLCHOLINE CHLORIDE 20 MG/ML
INJECTION INTRAMUSCULAR; INTRAVENOUS AS NEEDED
Status: DISCONTINUED | OUTPATIENT
Start: 2021-12-16 | End: 2021-12-16 | Stop reason: SURG

## 2021-12-16 RX ORDER — FAMOTIDINE 10 MG/ML
20 INJECTION, SOLUTION INTRAVENOUS ONCE
Status: COMPLETED | OUTPATIENT
Start: 2021-12-16 | End: 2021-12-16

## 2021-12-16 RX ORDER — HYDROMORPHONE HYDROCHLORIDE 1 MG/ML
0.5 INJECTION, SOLUTION INTRAMUSCULAR; INTRAVENOUS; SUBCUTANEOUS
Status: DISCONTINUED | OUTPATIENT
Start: 2021-12-16 | End: 2021-12-16 | Stop reason: HOSPADM

## 2021-12-16 RX ORDER — SODIUM CHLORIDE 9 MG/ML
9 INJECTION, SOLUTION INTRAVENOUS CONTINUOUS
Status: DISCONTINUED | OUTPATIENT
Start: 2021-12-16 | End: 2021-12-16 | Stop reason: HOSPADM

## 2021-12-16 RX ORDER — HYDROCODONE BITARTRATE AND ACETAMINOPHEN 5; 325 MG/1; MG/1
1-2 TABLET ORAL EVERY 4 HOURS PRN
Qty: 12 TABLET | Refills: 0 | Status: SHIPPED | OUTPATIENT
Start: 2021-12-16 | End: 2022-01-05

## 2021-12-16 RX ORDER — IBUPROFEN 600 MG/1
600 TABLET ORAL ONCE AS NEEDED
Status: DISCONTINUED | OUTPATIENT
Start: 2021-12-16 | End: 2021-12-16 | Stop reason: HOSPADM

## 2021-12-16 RX ORDER — LIDOCAINE HYDROCHLORIDE 20 MG/ML
INJECTION, SOLUTION INFILTRATION; PERINEURAL AS NEEDED
Status: DISCONTINUED | OUTPATIENT
Start: 2021-12-16 | End: 2021-12-16 | Stop reason: SURG

## 2021-12-16 RX ORDER — ONDANSETRON 2 MG/ML
4 INJECTION INTRAMUSCULAR; INTRAVENOUS ONCE AS NEEDED
Status: DISCONTINUED | OUTPATIENT
Start: 2021-12-16 | End: 2021-12-16 | Stop reason: HOSPADM

## 2021-12-16 RX ORDER — NALOXONE HCL 0.4 MG/ML
0.2 VIAL (ML) INJECTION AS NEEDED
Status: DISCONTINUED | OUTPATIENT
Start: 2021-12-16 | End: 2021-12-16 | Stop reason: HOSPADM

## 2021-12-16 RX ORDER — LABETALOL HYDROCHLORIDE 5 MG/ML
5 INJECTION, SOLUTION INTRAVENOUS
Status: DISCONTINUED | OUTPATIENT
Start: 2021-12-16 | End: 2021-12-16 | Stop reason: HOSPADM

## 2021-12-16 RX ORDER — CEFAZOLIN SODIUM 2 G/100ML
2 INJECTION, SOLUTION INTRAVENOUS ONCE
Status: COMPLETED | OUTPATIENT
Start: 2021-12-16 | End: 2021-12-16

## 2021-12-16 RX ORDER — SODIUM CHLORIDE 0.9 % (FLUSH) 0.9 %
3-10 SYRINGE (ML) INJECTION AS NEEDED
Status: DISCONTINUED | OUTPATIENT
Start: 2021-12-16 | End: 2021-12-16 | Stop reason: HOSPADM

## 2021-12-16 RX ORDER — CEPHALEXIN 500 MG/1
500 CAPSULE ORAL 2 TIMES DAILY
Qty: 10 CAPSULE | Refills: 0 | Status: SHIPPED | OUTPATIENT
Start: 2021-12-16 | End: 2021-12-21

## 2021-12-16 RX ADMIN — PROPOFOL 200 MG: 10 INJECTION, EMULSION INTRAVENOUS at 11:43

## 2021-12-16 RX ADMIN — SUCCINYLCHOLINE CHLORIDE 200 MG: 20 INJECTION, SOLUTION INTRAMUSCULAR; INTRAVENOUS; PARENTERAL at 11:44

## 2021-12-16 RX ADMIN — SODIUM CHLORIDE 9 ML/HR: 9 INJECTION, SOLUTION INTRAVENOUS at 09:43

## 2021-12-16 RX ADMIN — LIDOCAINE HYDROCHLORIDE 100 MG: 20 INJECTION, SOLUTION INFILTRATION; PERINEURAL at 12:03

## 2021-12-16 RX ADMIN — CEFAZOLIN SODIUM 2 G: 2 INJECTION, SOLUTION INTRAVENOUS at 10:42

## 2021-12-16 RX ADMIN — CLINDAMYCIN PHOSPHATE 600 MG: 600 INJECTION, SOLUTION INTRAVENOUS at 11:28

## 2021-12-16 RX ADMIN — FAMOTIDINE 20 MG: 10 INJECTION INTRAVENOUS at 09:58

## 2021-12-16 RX ADMIN — ROCURONIUM BROMIDE 5 MG: 50 INJECTION INTRAVENOUS at 11:42

## 2021-12-16 RX ADMIN — MIDAZOLAM 1 MG: 1 INJECTION INTRAMUSCULAR; INTRAVENOUS at 09:58

## 2021-12-16 RX ADMIN — MIDAZOLAM 1 MG: 1 INJECTION INTRAMUSCULAR; INTRAVENOUS at 10:47

## 2021-12-16 NOTE — ANESTHESIA PROCEDURE NOTES
Airway  Urgency: elective    Date/Time: 12/16/2021 11:45 AM  End Time:12/16/2021 11:45 AM  Difficult airway    General Information and Staff    Patient location during procedure: OR  Anesthesiologist: Luis Hart MD    Indications and Patient Condition  Indications for airway management: airway protection    Preoxygenated: yes  Mask difficulty assessment: 0 - not attempted    Final Airway Details  Final airway type: endotracheal airway      Successful airway: ETT  Cuffed: yes   Successful intubation technique: video laryngoscopy  Facilitating devices/methods: intubating stylet  Endotracheal tube insertion site: oral  Blade: CMAC  Blade size: D  ETT size (mm): 7.0  Cormack-Lehane Classification: grade IIa - partial view of glottis  Placement verified by: chest auscultation and capnometry   Number of attempts at approach: 1  Assessment: lips, teeth, and gum same as pre-op and atraumatic intubation

## 2021-12-16 NOTE — ANESTHESIA PREPROCEDURE EVALUATION
Anesthesia Evaluation     Patient summary reviewed and Nursing notes reviewed                Airway   Mallampati: II  TM distance: <3 FB  Neck ROM: full  Possible difficult intubation  Dental - normal exam     Pulmonary - normal exam   (+) sleep apnea on CPAP,   Cardiovascular     ECG reviewed  Rhythm: regular  Rate: normal    (+) hypertension 2 medications or greater, valvular problems/murmurs murmur, AS, MR, MS and TI, CHF , murmur, hyperlipidemia,     ROS comment: Bicuspid aortic valve/EF 56%, mod AS, mild MS, trace MR, mild TR by ECHO 7/21  PE comment: ALEJANDRO at [facility]    Neuro/Psych  (+) weakness, numbness, psychiatric history Anxiety and Depression,       ROS Comment: Peripheral neuropathy bilateral feet  GI/Hepatic/Renal/Endo    (+) obesity, morbid obesity,  renal disease stones, ESRD and dialysis, diabetes mellitus type 2, thyroid problem hypothyroidism    Musculoskeletal     Abdominal   (+) obese,    Substance History      OB/GYN          Other   arthritis,    history of cancer      Other Comment: Hx breast CA      Phys Exam Other: AV fistula left arm                Anesthesia Plan    ASA 4     general     intravenous induction     Anesthetic plan, all risks, benefits, and alternatives have been provided, discussed and informed consent has been obtained with: patient.    Plan discussed with CRNA.

## 2021-12-16 NOTE — OP NOTE
Operative Report    [facility]    Patient: Juana Hall  Age:      61 y.o.  :     1960  Sex:      female    Medical Record:  [id number]    Date of Operation/Procedure:  2021    Pre-op Diagnosis:   Left hydronephrosis    Post-Op Diagnosis Codes:   Same    Pre-operative Diagnosis Free Text:  * No pre-op diagnosis entered *     Name of Operation/Procedure:  Procedure(s) and Anesthesia Type:     * CYSTOSCOPY KEFT RETROGRADE PYELOGRAM  LEFT  URETERAL STENT PLACEMENT - General    Findings/Complications:  No complications.    Left retrograde pyelogram interpretation: mild/moderate left hydronephrosis. Normal caliber ureter. Indestinct mucosal edges, likely related to debris within the left renal pelvis. No discrete filling defects.    White discharge was seen from the left ureteral orifice.    Description of procedure: The patient was taken to the OR and placed under GA in lithotomy position.  Prepped and draped in sterile fashion.  The 21 Fr cystoscope was introduced and pancystoscopy was performed.  No tumors or stones were seen.  A Sensor guidewire was passed through the left ureteral orifice into the kidney without difficulty. A [place] catheter was inserted, and a retrograde pyelogram was performed with findings as above. A 6 Fr x 28 cm JJ stent was passed into the kidney into the proper anatomic position.    Estimated Blood Loss: minimal    Specimens:   Order Name Source Comment Collection Info Order Time   BASIC METABOLIC PANEL   Collected By: Florence De La Garza RN 2021  9:13 AM     Release to patient   Immediate            Fluids/Drains: none    Leodan Mckee Jr., MD  2021  12:07 EST

## 2021-12-16 NOTE — PERIOPERATIVE NURSING NOTE
Notified Dr. Mckee via cell phone via OR nurse to let him know that he did not escribe the norco.  OR nurse told him and he stated he would do it once he was done with his surgery.    Patient and  stated they wanted to leave and have Dr. Mckee escribe the prescription to their home pharmacy.

## 2021-12-16 NOTE — ANESTHESIA POSTPROCEDURE EVALUATION
Patient: Juana Hall    Procedure Summary     Date: 12/16/21 Room / Location: [facility] OR  / [facility] MAIN OR    Anesthesia Start: 1137 Anesthesia Stop: 1222    Procedure: CYSTOSCOPY KEFT RETROGRADE PYELOGRAM  LEFT  URETERAL STENT PLACEMENT (Left ) Diagnosis:     Surgeons: Leodan Mckee Jr., MD Provider: Luis Hart MD    Anesthesia Type: general ASA Status: 4          Anesthesia Type: general    Vitals  Vitals Value Taken Time   /50 12/16/21 1316   Temp 36.8 °C (98.2 °F) 12/16/21 1221   Pulse 60 12/16/21 1317   Resp 16 12/16/21 1315   SpO2 96 % 12/16/21 1317   Vitals shown include unvalidated device data.        Post Anesthesia Care and Evaluation    Patient location during evaluation: PACU  Patient participation: complete - patient participated  Level of consciousness: awake and alert  Pain management: adequate  Airway patency: patent  Anesthetic complications: No anesthetic complications    Cardiovascular status: acceptable  Respiratory status: acceptable  Hydration status: acceptable    Comments: --------------------            12/16/21               1355     --------------------   BP:       130/62     Pulse:      61       Resp:       16       Temp:                SpO2:      94%      --------------------

## 2021-12-16 NOTE — PROGRESS NOTES
"Chief Complaint  Metastatic lobular breast cancer (ER/MA positive, HER-2/tj negative), anemia secondary to CKD3, CHF, peripheral neuropathy, chemotherapy related nausea chemotherapy-induced myelosuppression    Subjective        History of Present Illness  The patient returns today for follow-up and evaluation prior to cycle 2-day 1 Faslodex.  She also continues on Ibrance 100 mg 7 days on 7 days off.  She is currently off Ibrance, due to start her next cycle back on Ibrance Monday.  Continues dialysis twice weekly, and receives Epogen through them.  She is tolerating treatment well.  She is eating and drinking adequately.  Her bowels are moving regularly.  She denies fever or chills.  She denies nausea or vomiting.  She denies signs or symptoms of bleeding.    Objective   Vital Signs:   /68 Comment: manual by NP  Pulse 65   Temp 97.1 °F (36.2 °C) (Temporal)   Resp 18   Ht 170.2 cm (67.01\")   Wt (!) 164 kg (361 lb) Comment: states  SpO2 98%   BMI 56.53 kg/m²       Physical Exam  Constitutional:       Appearance: She is well-developed. She is obese.      Comments: Patient is in a motorized scooter today   Eyes:      Conjunctiva/sclera: Conjunctivae normal.   Neck:      Thyroid: No thyromegaly.   Cardiovascular:      Rate and Rhythm: Normal rate and regular rhythm.      Heart sounds: Murmur heard.   No friction rub. No gallop.       Comments: 2/6 murmur  Pulmonary:      Effort: No respiratory distress.      Breath sounds: Normal breath sounds.      Comments: Dialysis access in place in the right subclavian position  Chest:   Breasts:      Right: No supraclavicular adenopathy.      Left: No supraclavicular adenopathy.       Abdominal:      General: Bowel sounds are normal. There is no distension.      Palpations: Abdomen is soft.      Tenderness: There is no abdominal tenderness.   Musculoskeletal:         General: Swelling present.      Comments: Trace bilateral lower extremity edema   Lymphadenopathy:    "   Head:      Right side of head: No submandibular adenopathy.      Cervical: No cervical adenopathy.      Upper Body:      Right upper body: No supraclavicular adenopathy.      Left upper body: No supraclavicular adenopathy.   Skin:     General: Skin is warm and dry.      Findings: No bruising or rash.   Neurological:      Mental Status: She is alert and oriented to person, place, and time.      Cranial Nerves: No cranial nerve deficit.      Motor: No abnormal muscle tone.      Deep Tendon Reflexes: Reflexes normal.   Psychiatric:         Behavior: Behavior normal.        Result Review : Reviewed CBC, CMP from today.       Assessment and Plan    1. Metastatic lobular breast cancer (ER/MO positive, HER-2/tj negative):  · History of stage IIIC right breast cancer ([id number])  · Patient treated previously in the [facility]  · Diagnosis via excisional biopsy 8/23/2003 with lobular carcinoma, 4.5 cm, positive margins.  ER/MO positive, HER-2/tj negative.  · Staging evaluation in September 2003 with negative bone scan and negative CT scans.  Ejection fraction 65%.  · Received neoadjuvant chemotherapy (? GET regimen) which apparently included Taxotere and possibly epirubicin.  Received 6 cycles.  · 1/22/2004 bilateral mastectomy with right axillary dissection.  Per available records, 10-12 cm residual invasive lobular carcinoma in the breast with 14/16 lymph nodes positive with extranodal extension.  Immediate reconstruction.  · Received adjuvant chemotherapy with carboplatin and navelbine x4 cycles  · Initiated adjuvant tamoxifen 6/22/2004  · Adjuvant radiation therapy initiated but interrupted due to chest wall cellulitis requiring removal of implants in August 2004  · Implant reconstruction again attempted with MRSA infection, implant removed 12/30/2005 with no further attempts made and reconstruction.  · Completed 5 years tamoxifen in June 2009 and subsequently transitioned to Femara.  Received Femara until June  2014.  · Patient experienced postmenopausal vaginal bleeding in 2013, negative endometrial biopsy and gynecologic evaluation.  · Patient last seen in [facility] 6/18/2014  · CT chest performed to evaluate bicuspid aortic valve and dilated asending aorta 7/12/2019.  CT showed no change in aorta however showed new free intraperitoneal fluid in the upper abdomen with mesenteric edema, concerning for carcinomatosis.  · CT abdomen and pelvis (without IV contrast) on 7/16/2019 with mesenteric thickening, small volume of complex fluid in the mesentery which was suspicious and possibly representative of carcinomatosis, small amount of perihepatic fluid, small bowel loops tethered to omentum without obstruction, omental thickening, shotty retroperitoneal and pelvic lymph nodes (non-pathologically enlarged).  · PET scan 7/26/2019 with hypermetabolic omental activity, hypermetabolic small retroperitoneal lymph nodes, hypermetabolic activity throughout uterus with increase in size.  · CT-guided omental biopsy 7/30/2019 with metastatic lobular carcinoma of breast primary, ER positive (greater than 95%), ME positive (90%), HER-2/tj negative (1+ IHC)  · Baseline CA 15-3 on 7/22/19 was 32.6  · Initiation of Aromasin 25 mg daily 8/12/2019.  Initiated Ibrance at 100 mg 21/28 days on 8/26/2019.  · Improvement in CA 15-3 on 9/17/19-26.3  · Following 2 cycles of Aromasin and Ibrance, CA 15-3 declined to 25.9 on 10/15/2019.  CT scan chest abdomen pelvis 10/16/2019 showed improvement in the mesenteric and omental thickening and near resolution of complex ascites.  Treatment continued.  · Stable findings on subsequent CT chest abdomen pelvis 12/11/2019.  Further improvement in CA 15-3-23.9 on 12/18/2019.  Ibrance/Aromasin continued.   · [facility] liquid biopsy 2/5/2020: MSI undetermined, mutations in BETH, NF1, CHEK2.  No evidence of PIK3CA mutation.  · Subsequent [facility] germline testing 11/12/2021 with BETH VUS (c.2864C>A)  and POLE VUS (c.631A>T)  · Slight increase in CA 15-3 to 27.1 on 2/19/2020 and 29.1 on 3/18/2020.  CT however on 3/13/2020 with no new findings.  · Subsequent improvement in CA 15-3-26.5 on 5/15/2020  · CT 7/31/2020 showed evidence of stable disease.  CA 15-3 declined further to 23.1 on 7/24/2020.  · CT 10/19/2020 with stable disease.  Fluctuations in CA 15-3 of unclear significance.  · CA 15-3 on 1/6/2021 decreased to 23.  CT chest abdomen pelvis 1/29/2021 with stable findings.  · CA 15-3 on 4/1/2021 was 28.4  · CT 4/22/2021 with no evidence of progressive malignancy, notation of new focal linear subpleural opacification right upper lobe felt to be infectious and/or inflammatory.  Enlargement of 2 staghorn calculi left kidney.  Persistent left perinephric stranding.    · Hospitalization 4/29-5/4/2021 with RYAN/CKD, UTI, anemia.  Required 2 unit PRBC transfusion and initiated hemodialysis Tuesday Thursday Saturday.  Brief interruption in Ibrance during hospitalization.  · CA 15-3 on 5/27/2021 was 27.8.  · CT chest abdomen pelvis 7/2/2021 showed no evidence of progression or new metastatic disease.  There was only one remaining left renal stone which was smaller than previous exam.  Stranding surrounding both kidneys left greater than right with left hydronephrosis and hydroureter.  · CA 15-3 7/7/2021 was 25.3  · Patient experienced severe ongoing anemia felt to be in part related to Ibrance requiring transfusion support intermittently.  Ibrance dose decreased on 8/23/2020 1 to 100 mg daily for 7 days on followed by 7 days off.  · Stable CA 15-3 on 9/1/2021 at 25.5 and again 10/13/2021 at 25.6.  · CT chest abdomen pelvis 10/26/2021 with increase in left hydronephrosis with increase in left perinephric and periureteral stranding. Decrease in stone in the left kidney lower pole (7 mm).  Stable small retroperitoneal lymph and left periaortic lymph nodes.  · PET scan 11/10/2021 with multiple bilateral hypermetabolic  nodular pulmonary lesions, asymmetric moderate to severe left hydronephrosis with ill-defined fat stranding and soft tissue thickening in the renal sinus and surrounding the left ureter, hypermetabolic left retroperitoneal lymph node with slight increase in size, ill-defined hypermetabolic thickening in the inferior omentum with increase in size, uptake and C7 (indeterminate, recommended MRI).  Short segments of uptake in the mid and distal esophagus possibly related to gastritis.  · PET scan findings felt to be consistent with progressive malignancy.  Patient declined repeat omental biopsy.   · Options for further treatment discussed on 11/12/2021.  In light of renal failure and dialysis, options are more limited.  Recommendation to continue Ibrance and discontinue Aromasin, begin Faslodex.  Discussed possible future use of single agent Taxol.    · Aromasin discontinued 11/12/2021  · On 11/22/2021 initiation of Faslodex with continuation of Ibrance (100 mg daily for 7 days on followed by 7 days off).  · MRI cervical spine 11/18/2021 showed the right C7 normality to likely represent metastatic disease.  With solitary bone lesion, did not recommend pursuit of bone modifying agent.  · The patient returns today in follow-up continuing on Faslodex and Ibrance.  She is due for cycle 1 day 15 Faslodex 500 mg IM on 12/6/2021.  She is currently receiving a week of Ibrance 100 mg daily and will begin a week off 12/6/2021 (Ibrance dosed at 100 mg daily 7 days on followed by 7 days off).  She is doing well on current treatment.  She continues with significant anemia that is multifactorial, hemoglobin today 7.5 and we are pursuing additional transfusion 1 unit PRBC (see below).  She was seen by supportive oncology and increased her Zoloft dose from 15 up to 100 mg daily.  There was plan to begin armodafinil 50 mg daily however there is question regarding insurance coverage.  Currently trying to work through this. We reviewed a  number of studies today including MRI C-spine 11/18/2021 which evaluated an abnormality at C7 seen on PET scan.  It does appear that this area is likely metastasis.  She is asymptomatic however.  We discussed that with a solitary site of bone involvement, would not necessarily pursue a bone modifying agent, particularly in relation to her ongoing dialysis.  There was an additional area of activity at C5-6 however this appeared to be related to degenerative change.  There was also a right thyroid cyst which would be discussed further below.  We did pursue thyroid ultrasound to evaluate further.  We also reviewed her [facility] panel test, obtained due to previous liquid biopsy showing BETH mutation.  This showed VUS in BETH and POLE.  We will proceed on with treatment as planned with Faslodex on 12/6/2021.    · 12/21/2021, proceed with cycle 2-day 1 Faslodex today.  Patient continues on Ibrance 100 mg 7 days on 7 days off.  Currently off Ibrance, due to restart on Monday.  She will return in 4 weeks for MD follow-up and cycle 3-day 1 Faslodex, 1 week prior PET scan will be performed to assess response.  2. Anemia secondary to ESRD, exacerbated by Ibrance:  · The patient appears to have a chronic anemia with hemoglobin in the 10-11 range with normal MCV.  · Patient has underlying CKD3 with baseline creatinine recently in the 1.5-1.8 range.  · Anemia evaluation 7/22/2019 with iron 30, ferritin 85.2, iron saturation 10%, TIBC 311, B12 370, erythropoietin level 20.4  · Anemia felt in large part to be related to CKD3 as well as to underlying malignancy  · Evidence of folate deficiency 8/12/2019 with level 3.84, initiated folic acid 1 mg daily  · Additional labs on 12/18/2019 with iron 73, ferritin 82.2, iron saturation 25%, TIBC 290.  We did discuss the potential use of Procrit if her hemoglobin declines into the low 9/upper 8 range.  · Decline in hemoglobin into the 8 range, iron studies 7/20/2020 with iron 54, ferritin 66.7,  iron saturation 16%, TIBC 328.  On 8/7/2020 proceeded with Injectafer 750 mg IV x1 dose.  Second dose not administered due to onset of fever, subsequent iron studies precluded further administration of IV iron.  · Initiated Procrit 20,000 units every 4 weeks on 9/4/2020.  · On 1/6/2021, hemoglobin declined significantly to 7.1.  This was repeated and verified at 7.2.  No evidence of GI blood loss, stool cards negative for occult blood x3 on 1/12/2021.  Additional labs on 1/6/2021 with iron 47, ferritin 430, iron saturation 20%, TIBC 235, folate greater than 20, B12 324, haptoglobin 525, .  · Transfused 2 unit PRBC on 1/7/2021  · Change in Procrit dosing to weekly  · Due to low normal B12 level initiated oral B12 1000 mcg daily.  · Patient with symptomatic decline in hemoglobin to 7.7 on 4/1/2021, received 1 unit PRBC transfusion  · Hospitalization 4/29-5/4/2021 with RYAN/CKD, UTI, anemia.  Required 2 unit PRBC transfusion and initiated hemodialysis Tuesday Thursday Saturday.  · Transfused 2 units PRBC for hemoglobin 7.2 on 5/27/2021  · Following initiation of hemodialysis, management of BEAU transitioned to nephrology, receiving Epogen with dialysis.  · On 7/21/2021, decline in hemoglobin to 6.5 requiring additional transfusion support.  On 7/21/2021, additional anemia evaluation with iron 17, ferritin 646, iron saturation 9%, TIBC 193, folate greater than 20, B12 690.  Contacted Dr. Antonio in nephrology and confirmed that she was receiving maximum dose Epogen with dialysis.   · Patient received additional transfusion with hemoglobin down to 7.4 on 8/18/2021.  · Ongoing transfusion support prompted alteration in Ibrance dosing on 8/23/2021 as outlined above.  · After alteration in Ibrance dosing, hemoglobin improved and remained stable in the 9-10 range.  · Subsequent worsening of anemia with hemoglobin down to 7.4 on 10/13/2021 requiring transfusion 2 unit PRBC  · Additional labs 10/27/2021 with iron 14,  ferritin seven or 97.4, iron saturation 7%, TIBC 213, folate greater than 20, CRP 18.06, B12 1452. Consistent with anemia secondary to chronic disease/malignancy and ESRD  · Stool negative for occult blood x3 on 11/2/2021  · Hemoglobin 7.4 on 11/22/2021, transfuse 1 unit PRBC  · Hemoglobin 12/3/2021 stable at 7.5 following recent transfusion.  Patient notes improvement in fatigue after transfusion.  She does continue receiving Epogen and intermittent IV iron from nephrology with dialysis.  With decline in dialysis frequency to 2 days/week, Epogen dosing is also decreased to 2 days/week possibly accounting for decline in her hemoglobin recently.  We will proceed with 1 unit PRBC transfusion again.   · 12/21/2021, hemoglobin today 8.3.  Continue monitoring with CBC every 2 weeks and transfusional support as needed.  3. ESRD on HD:  · Baseline creatinine recently in 1.6-2.0 range  · On 9/10/2019, RYAN/CKD3 with creatinine up to 2.44, BUN of 40.  Received 1 L normal saline.  Patient with increase in oral fluid intake.  · Renal ultrasound 9/20/2019 with no evidence of obstructive uropathy.  · Diminished oral intake due to nausea from Ibrance, creatinine 2.79 with BUN 43 on 10/15/2019.  Received 1 L normal saline.  Repeat labs on 10/18/2019 with BUN 31, creatinine 2.0.  · During cycle 3 Ibrance, patient developed increase in creatinine on 11/6/2019 to 2.35 and received 1 L normal saline.  · Patient is trying to maintain adequate oral hydration.    · Gradual escalation of creatinine into the 2-2.4 range and subsequently into the 2.5-2.8 range  · Creatinine increased into the low 3 range in early January 2021.  Patient increased oral hydration with improvement back into the mid 2 range.  · Hospitalization 4/29-5/4/2021 with RYAN/CKD, UTI, anemia.  Required 2 unit PRBC transfusion and initiated hemodialysis Tuesday Thursday Saturday.   · Patient reports recent decrease in frequency of dialysis to twice per week on Tuesdays and  Saturdays.  She continues to produce a significant amount of urine.    · Status post left upper extremity AV fistula placement on 11/3/2021 by vascular surgery  · Patient reports there has been consideration of further tapering of her dialysis schedule.  4. CHF with mild to moderate aortic stenosis:  · Recent cardiology evaluation with echocardiogram 7/12/2019 showing ejection fraction 48% with moderate dilation of the LV cavity, global hypokinesis, grade 2 diastolic dysfunction, mild to moderate aortic stenosis, trivial pericardial effusion.  · CT chest 7/12/2019 with no change in the aorta compared to prior study 12/13/2017.  · Echocardiogram 7/19/2021 with ejection fraction 56%, left atrial volume moderately increased, grade 2 diastolic dysfunction, severe calcification aortic valve, moderate aortic stenosis, severe mitral calcification, mild mitral stenosis, marked elevation in RVSP from tricuspid regurgitation.  · Patient notes that she discussed echo results with cardiology and valvular status was felt to be stable in regards to aortic stenosis.  5. Left upper and bilateral lower extremity peripheral neuropathy:  · Patient reports that left upper extremity neuropathy was not present from previous chemotherapy but occurred after hospitalization that required intubation and critical care stay.  Apparently felt to represent critical care neuropathy.  Improved over time.  · Bilateral lower extremity neuropathy felt to be related to diabetes  · May have future implications regarding treatment for breast cancer.  · Patient reports that peripheral neuropathy symptoms continue in the bilateral lower extremities, unchanged  6. Uterine/endometrial activity on PET scan:  · Patient experienced postmenopausal vaginal bleeding in 2013, negative endometrial biopsy and gynecologic evaluation.  · With findings on PET scan with enlarging uterus and some hypermetabolic activity, referred back to Dr. Oliveros and gynecology.    · 9/19/2019 D&C with hysteroscopy by Dr. Oliveros, no significant intraoperative findings, pathologic results with benign findings, no evidence of malignancy.  7. Nausea:  · Mild, relieved with Zofran.   · Patient experienced improvement in nausea after initiating hemodialysis  8. Myelosuppression secondary to Ibrance:  · With cycle 1, jessica WBC 2.49 with ANC 1.25 and platelet count 140,000.  · With cycle 2, jessica WBC 2.33 with ANC 1.06, maintained normal platelet count  · Today, WBC 2.37, ANC 1.77, hemoglobin 8.3, platelets 241,000.  9. Depression  · Patient with history of depression, worsened after the death of her son in November 2021  · With evidence of progressive malignancy in November 2021, patient agreeable to referral to supportive oncology  · Patient was seen in supportive oncology 11/18/2021, Zoloft increased from 50 up to 100 mg daily.  Prescribed armodafinil 50 mg daily however there are issues with insurance coverage  · Patient reports that her symptoms have improved.  10. Left perinephric stranding secondary to malignancy with hydronephrosis:  · CT 4/22/2021 showed interval enlargement of 2 staghorn calculi in the left kidney with persistent left perinephric stranding  · Hospitalization 4/29-5/4/2021 with RYAN/CKD, UTI, anemia.  Required 2 unit PRBC transfusion and initiated hemodialysis Tuesday Thursday Saturday.    · Discussion from urology regarding potential need for surgical procedure to address staghorn calculus left kidney  · CT scan 7/2/2021 with only 1 remaining left renal stone identified which had decreased in size.  Patient appears to have passed the other stone.  · As above, CT 10/26/2021 with more prominent left hydronephrosis and increase in left perinephric and periureteral stranding.  Felt to possibly be related to passage of recent stone versus partial obstruction secondary to debris or thrombus in the left ureter versus pyelonephritis.  Patient however with no clinical evidence of  recent stone passage nor pyelonephritis. Malignancy felt to be the cause of CT changes around the left kidney at this point.   · Patient was seen by urology, left ureteral stent placement on 12/15/2021.  11. Right thyroid nodules  · Patient underwent prior thyroid biopsy by her report with benign findings many years ago, records not available  · On MRI cervical spine 11/18/2021, finding of 1.8 cm right thyroid nodule  · Thyroid ultrasound 11/26/2021 with right-sided thyroid nodules, 1 nodule was hypoechoic, solid measuring 2 cm with biopsy recommended.  · Dr. Irvin reviewed scan results on 12/3/2021.  In light of her metastatic breast cancer, renal failure the significance of the thyroid nodule is felt to be much less.  We discussed possibility of thyroid biopsy however patient is inclined to forego this, especially since she has had a biopsy performed in the past with benign findings by her report.  We will monitor lesion on follow-up PET scan.    Plan:   1. Continue Ibrance 100 mg daily for 7 days on followed by 7 days off (currently off treatment, will start on 12/27/2021.).   2. Proceed with cycle 2-day 1 Faslodex today.  3. The patient continues on hemodialysis Tuesday and Saturday (2 days/week currently)  4. Nephrology is currently managing the patient's anemia, patient receiving Epogen with dialysis as well as intermittent IV iron.  5. Continue oral folic acid 1 mg daily, B12 1000 mcg daily.  6. The patient continues follow-up in the supportive oncology clinic.  Insurance has not approved use of armodafinil, patient to discuss this with supportive oncology.  7. On 1/4/2021 CBC with RN review  8. Week of 1/10/2021 PET scan  9. MD visit on 1/18/2021 with CBC, CMP and Faslodex pending PET scan results.    Patient continues on high risk medication requiring intensive monitoring.    Maritza Snider, APRN  12/21/2021

## 2021-12-16 NOTE — H&P
[facility]       Patient Identification:  NAME:  Juana Hall  Age:  61 y.o.   Sex:  female   :  1960   MRN:  [id number]       Chief complaint: hydronephrosis    History of present illness:    61 yof with hx peritoneal carcinomatosis, breast cancer s/p chemo/radiation and neela mastectomy in . Has worsening mod-severe left hydronephrosis. Presents today for left ureteral stent insertion.       Past medical history:  Past Medical History:   Diagnosis Date   • Anemia    • Anxiety and depression    • Bicuspid aortic valve    • Breast cancer (HCC)     RIGHT, HAD NEELA. MASTECTOMY, CHEMO AND RADIATION   • CHF (congestive heart failure) (HCC)    • CKD (chronic kidney disease)    • Diabetes mellitus (HCC)    • Dialysis patient (HCC)     TUES AND SATURDAY [facility]   • Elevated cholesterol    • Frequent UTI    • Heart murmur    • History of kidney stones    • History of MRSA infection     POST BREAST SURGERY-TREATED [facility]   • History of sepsis 2010    KIDNEY STONE   • History of transfusion    • Hyperlipidemia    • Hypertension    • Hyperthyroidism    • Hypothyroidism    • Kidney stone     CURRENT LEFT-DEC 2021   • Lymphedema of leg     LEFT   • Metastasis from breast cancer (HCC)     STOMACH-OMENTUM   • Neuromuscular disorder (HCC)    • Obesity    • ANDREW on CPAP     CPAP   • Osteoarthrosis    • Peripheral neuropathy     FEET BILAT   • Radiculopathy    • Thickened endometrium    • Weakness     BILAT LEGS-WITH ANY AMT OF TIME       Past surgical history:  Past Surgical History:   Procedure Laterality Date   • ARTERIOVENOUS FISTULA/SHUNT SURGERY Left 11/3/2021    Procedure: LEFT  BRACHIOCEPALIC ARTERIOVENOUS FISTULA;  Surgeon: Dejuan Adler MD;  Location: [facility];  Service: Vascular;  Laterality: Left;   • BREAST RECONSTRUCTION      WITH IMPLANTS, AND REVISION, NOW REMOVED   • BREAST SURGERY     • CATARACT EXTRACTION WITH INTRAOCULAR LENS IMPLANT Bilateral    •   SECTION  1987   •  SECTION  1987   • CYSTOSCOPY W/ URETERAL STENT PLACEMENT     • D & C HYSTEROSCOPY N/A 2017    Procedure: DILATATION AND CURETTAGE, HYSTEROSCOPY ;  Surgeon: Cherie Oliveros MD;  Location: [facility] OR [facility];  Service:    • D & C HYSTEROSCOPY N/A 2019    Procedure: DILATATION AND CURETTAGE HYSTEROSCOPY/POLYPECTOMY;  Surgeon: Cherie Oliveros MD;  Location: [facility] OR [facility];  Service: Gynecology   • INSERTION HEMODIALYSIS CATHETER Right 2021    Procedure: HEMODIALYSIS CATHETER INSERTION;  Surgeon: Clint Hernández MD;  Location: [facility] OR;  Service: Vascular;  Laterality: Right;   • LYMPH NODE BIOPSY     • MASTECTOMY Bilateral        Allergies:  Patient has no known allergies.    Home medications:  Medications Prior to Admission   Medication Sig Dispense Refill Last Dose   • acetaminophen (TYLENOL) 500 MG tablet Take 500 mg by mouth As Needed.   Past Week at Unknown time   • amLODIPine (NORVASC) 5 MG tablet Take 1 tablet by mouth Daily. 90 tablet 3 2021 at 0700   • Armodafinil 50 MG tablet Take 50 mg by mouth Daily for 30 days. 30 tablet 0 12/15/2021 at 0800   • aspirin 81 MG EC tablet Take 81 mg by mouth Daily. HELD FOR SURGERY   Past Month at Unknown time   • atorvastatin (LIPITOR) 40 MG tablet Take 1 tablet by mouth Daily. (Patient taking differently: Take 40 mg by mouth Every Night.) 90 tablet 1 12/15/2021 at 2200   • carvedilol (COREG) 3.125 MG tablet Take 1 tablet by mouth 2 (Two) Times a Day. 180 tablet 3 2021 at 0800   • folic acid (FOLVITE) 1 MG tablet TAKE ONE TABLET BY MOUTH DAILY (Patient taking differently: Take 1 mg by mouth Daily.) 90 tablet 1 12/15/2021 at 0800   • Levemir FlexTouch 100 UNIT/ML injection Inject 40 Units under the skin into the appropriate area as directed 2 (Two) Times a Day. (Patient taking differently: Inject 40 Units under the skin into the appropriate area as directed Daily.) 3 pen 5 12/15/2021 at 0800    • levothyroxine (SYNTHROID, LEVOTHROID) 50 MCG tablet Take 1 tablet by mouth Daily. 90 tablet 1 2021 at 0800   • ondansetron (Zofran) 8 MG tablet Take 1 tablet by mouth Every 8 (Eight) Hours As Needed for Nausea or Vomiting. 30 tablet 2 Past Month at Unknown time   • Palbociclib 100 MG tablet tablet Take 1 tablet by mouth Daily. Take for 7 days on, then 7 days off. 14 tablet 6 12/15/2021 at 0800   • sertraline (ZOLOFT) 100 MG tablet Take 1 tablet by mouth Daily. (Patient taking differently: Take 200 mg by mouth Every Night.) 90 tablet 0 12/15/2021 at 2200   • vitamin B-12 (CYANOCOBALAMIN) 1000 MCG tablet Take 1,000 mcg by mouth Daily.   Past Month at Unknown time   • vitamin D (ERGOCALCIFEROL) 94911 units capsule capsule Take 50,000 Units by mouth Every 7 (Seven) Days. WEDNESDAY   Past Month at Unknown time   • Dulaglutide 1.5 MG/0.5ML solution pen-injector Inject 1.5 mg under the skin into the appropriate area as directed 1 (One) Time Per Week.  pen 1 2021        Hospital medications:  ceFAZolin, 2 g, Intravenous, Once  sodium chloride, 3 mL, Intravenous, Q12H      lactated ringers, 9 mL/hr  sodium chloride, 9 mL/hr, Last Rate: 9 mL/hr (21 0943)      fentanyl  •  lidocaine PF 1%  •  midazolam  •  sodium chloride    Family history:  Family History   Problem Relation Age of Onset   • Coronary artery disease Mother         Mother  from MI at 72   • Diabetes Mother    • Breast cancer Mother    • Hyperlipidemia Mother    • Arthritis Mother    • Cancer Mother    • Aortic aneurysm Father         Father with ruptured thoracic aortic aneurysm   • Coronary artery disease Father         Father  from MI at 74   • Heart disease Father    • Hyperlipidemia Father    • Arthritis Father    • Coronary artery disease Maternal Grandmother         MGM deceeased from MI at age 76   • Malig Hyperthermia Neg Hx        Social history:  Social History     Tobacco Use   • Smoking status: Never  Smoker   • Smokeless tobacco: Never Used   Vaping Use   • Vaping Use: Never used   Substance Use Topics   • Alcohol use: No   • Drug use: No       Review of systems:      10 point ROS negative unless stated above    Objective:  TMax 24 hours:   Temp (24hrs), Av.9 °F (37.2 °C), Min:98.9 °F (37.2 °C), Max:98.9 °F (37.2 °C)      Vitals Ranges:   Temp:  [98.9 °F (37.2 °C)] 98.9 °F (37.2 °C)  Heart Rate:  [60-61] 60  Resp:  [18] 18  BP: (127)/(51) 127/51    Intake/Output Last 3 shifts:  No intake/output data recorded.     Physical Exam:    General Appearance:    Alert, cooperative, NAD   HEENT:    No trauma, pupils reactive, hearing intact   Back:     No CVA tenderness   Lungs:     Respirations unlabored, no wheezing    Heart:    RRR, intact peripheral pulses   Abdomen:     Soft, NDNT, no masses, no guarding   :    Pelvic not performed, bladder nondistended and nontender   Extremities:   No edema, no deformity   Lymphatic:   No neck or groin LAD   Skin:   No bleeding, bruising or rashes   Neuro/Psych:   Orientation intact, mood/affect pleasant, no focal findings       Results review:   I reviewed the patient's new clinical results.    Data review:  Lab Results (last 24 hours)     Procedure Component Value Units Date/Time    Basic Metabolic Panel [[id number]] Collected: 21    Specimen: Blood Updated: 21    POC Glucose Once [[id number]]  (Normal) Collected: 21    Specimen: Blood Updated: 21     Glucose 92 mg/dL      Comment: Meter: [id number] : [id number] Josh GAMING              Imaging:  Imaging Results (Last 24 Hours)     ** No results found for the last 24 hours. **             Assessment:       * No active hospital problems. *    Left hydronephrosis  Peritoneal carcinomatosis  Hx breast cancer  Hx nephrolithiasis  Hx of UTI    Plan:     OR today for cystoscopy, left retrograde pyelogram, ureteral stent insertion  R/b/a discussed including pain, infection, bleeding,  injury to surrounding structures. Patient wishes to proceed    Sidney Spicer  12/16/21  10:29 EST

## 2021-12-20 ENCOUNTER — OFFICE VISIT (OUTPATIENT)
Dept: PSYCHIATRY | Facility: HOSPITAL | Age: 61
End: 2021-12-20

## 2021-12-20 DIAGNOSIS — F41.9 ANXIETY AND DEPRESSION: Primary | ICD-10-CM

## 2021-12-20 DIAGNOSIS — G47.33 OSA (OBSTRUCTIVE SLEEP APNEA): ICD-10-CM

## 2021-12-20 DIAGNOSIS — F32.A ANXIETY AND DEPRESSION: Primary | ICD-10-CM

## 2021-12-20 DIAGNOSIS — F33.1 MODERATE EPISODE OF RECURRENT MAJOR DEPRESSIVE DISORDER (HCC): ICD-10-CM

## 2021-12-20 DIAGNOSIS — F43.21 GRIEF: ICD-10-CM

## 2021-12-20 PROCEDURE — 99214 OFFICE O/P EST MOD 30 MIN: CPT | Performed by: NURSE PRACTITIONER

## 2021-12-20 RX ORDER — TRAZODONE HYDROCHLORIDE 50 MG/1
50 TABLET ORAL NIGHTLY
Qty: 30 TABLET | Refills: 2 | Status: SHIPPED | OUTPATIENT
Start: 2021-12-20 | End: 2022-03-03

## 2021-12-20 RX ORDER — ARMODAFINIL 50 MG/1
50 TABLET ORAL DAILY
Qty: 30 TABLET | Refills: 1 | Status: SHIPPED | OUTPATIENT
Start: 2021-12-20 | End: 2022-01-05

## 2021-12-20 NOTE — PROGRESS NOTES
[facility]  In Person Session    Subjective  Patient ID: Juana Hall is a 61 y.o. female is seen face to face in the [facility].    CC: Persistent grief    HPI:Pt noted to be alert, oriented, and engaged in conversation. Full range of affect noted; mood reportedly improved, despite persistent sadness, grief   Interval history reviewed; pt had recent ureteral stent placed last Thursday, continuing to go to the restroom all the time.  Unfortunately, does not notice any benefit to procedure and anticipates repeat 24 hour urine next week. Continues hemodialysis twice weekly.  Pt reports increasing zoloft per our last discussion, currently taking 100 mg daily. Reports reduced sense of tearfulness. Feels she has been better able to participate and engage since our discussion. Has enrolled with [facility] and enjoys going to play cards twice weekly, anticipating Saranas party. Has begun to go back to grocery with  and increase time together. Continues to identify reduced since of interest and robyn in many activities, although does enjoy playing ards. Continuse to see grandson on weekly basis, most every Sunday.  Pt continues with challenges sleeping at night. Continues to utilize CPAP, take melatonin, utilizes sound machine and eye mask, and working to reduce clock watching, specifically on dialysis days. Has used ambien 5 mg q hs in the past, although never with significant benefit.  Feels improving sleep would be especially helpful on mood and quality of life.  Pt continues to grieve the loss of son; acknowledges wearing a Perry shirt today with attempt to honor holidays, although generally does not care. States  and son are managing holiday plans, and did put up a Greg tree for grandson this year. Has purchased presents but would rather skip the holidays.  Reviews difficulty going into kitchen anymore, as this was what she and her son  used to do together.  Continues to identify various impacts of complicated grief.    Review of Systems   Psychiatric/Behavioral: Positive for dysphoric mood and sleep disturbance. The patient is not nervous/anxious.      Medications Reviewed:  Zoloft 100 mg daily, increased at last visit  Armodafinil - difficulty obtaining, minimally beneficial    Diagnoses and all orders for this visit:    1. Anxiety and depression (Primary)    2. Moderate episode of recurrent major depressive disorder (HCC)    3. Grief    4. ANDREW (obstructive sleep apnea)    Other orders  -     traZODone (DESYREL) 50 MG tablet; Take 1 tablet by mouth Every Night.  Dispense: 30 tablet; Refill: 2    Plan of Care  Patient with some benefit to increase dose of Zoloft, personal dedication to increased level of behavioral activation alongside complicated grief.  Reviewed with patient limitations of current clinic and provided her with supportive information surrounding individual and grief therapy in the community as well as discussion surrounding books to read and resources available on the Internet.  At this time, patient is somewhat hesitant to group therapy, although is agreeable to continue to meet with me.  Patient is interested in reading a book to assist with grief process, and we have reviewed suggestions.  Regarding sleep, will trial trazodone 50 mg q hs. Continue zoloft 100 mg daily.  Reviewed use of armodafinil, and encouraged patient to omit this on dialysis days, and consider taking as needed for days of higher energy need to so as to compare with days she does not take.  Follow up arranged in clinic in 4 weeks.    I spent 36 minutes caring for Juana on this date of service. This time includes time spent by me in the following activities: preparing for the visit, obtaining and/or reviewing a separately obtained history, performing a medically appropriate examination and/or evaluation, counseling and educating the patient/family/caregiver,  ordering medications, tests, or procedures and documenting information in the medical record.

## 2021-12-21 ENCOUNTER — INFUSION (OUTPATIENT)
Dept: ONCOLOGY | Facility: HOSPITAL | Age: 61
End: 2021-12-21

## 2021-12-21 ENCOUNTER — OFFICE VISIT (OUTPATIENT)
Dept: ONCOLOGY | Facility: CLINIC | Age: 61
End: 2021-12-21

## 2021-12-21 ENCOUNTER — LAB (OUTPATIENT)
Dept: LAB | Facility: HOSPITAL | Age: 61
End: 2021-12-21

## 2021-12-21 VITALS
DIASTOLIC BLOOD PRESSURE: 68 MMHG | WEIGHT: 293 LBS | BODY MASS INDEX: 45.99 KG/M2 | OXYGEN SATURATION: 98 % | HEIGHT: 67 IN | SYSTOLIC BLOOD PRESSURE: 118 MMHG | TEMPERATURE: 97.1 F | HEART RATE: 65 BPM | RESPIRATION RATE: 18 BRPM

## 2021-12-21 DIAGNOSIS — N18.6 ANEMIA DUE TO CHRONIC KIDNEY DISEASE, ON CHRONIC DIALYSIS (HCC): ICD-10-CM

## 2021-12-21 DIAGNOSIS — C50.919 METASTATIC BREAST CANCER: Primary | ICD-10-CM

## 2021-12-21 DIAGNOSIS — Z99.2 ANEMIA DUE TO CHRONIC KIDNEY DISEASE, ON CHRONIC DIALYSIS (HCC): ICD-10-CM

## 2021-12-21 DIAGNOSIS — Z79.899 HIGH RISK MEDICATION USE: ICD-10-CM

## 2021-12-21 DIAGNOSIS — D63.1 ANEMIA DUE TO CHRONIC KIDNEY DISEASE, ON CHRONIC DIALYSIS (HCC): ICD-10-CM

## 2021-12-21 DIAGNOSIS — C50.919 METASTATIC BREAST CANCER: ICD-10-CM

## 2021-12-21 DIAGNOSIS — N18.32 STAGE 3B CHRONIC KIDNEY DISEASE (HCC): ICD-10-CM

## 2021-12-21 LAB
ALBUMIN SERPL-MCNC: 3.2 G/DL (ref 3.5–5.2)
ALBUMIN/GLOB SERPL: 0.7 G/DL (ref 1.1–2.4)
ALP SERPL-CCNC: 112 U/L (ref 38–116)
ALT SERPL W P-5'-P-CCNC: <5 U/L (ref 0–33)
ANION GAP SERPL CALCULATED.3IONS-SCNC: 10.4 MMOL/L (ref 5–15)
AST SERPL-CCNC: 10 U/L (ref 0–32)
BASOPHILS # BLD AUTO: 0.04 10*3/MM3 (ref 0–0.2)
BASOPHILS NFR BLD AUTO: 1.7 % (ref 0–1.5)
BILIRUB SERPL-MCNC: 0.5 MG/DL (ref 0.2–1.2)
BUN SERPL-MCNC: 17 MG/DL (ref 6–20)
BUN/CREAT SERPL: 5.6 (ref 7.3–30)
CALCIUM SPEC-SCNC: 8.5 MG/DL (ref 8.5–10.2)
CANCER AG15-3 SERPL-ACNC: 23.7 U/ML
CHLORIDE SERPL-SCNC: 100 MMOL/L (ref 98–107)
CO2 SERPL-SCNC: 25.6 MMOL/L (ref 22–29)
CREAT SERPL-MCNC: 3.01 MG/DL (ref 0.6–1.1)
DEPRECATED RDW RBC AUTO: 60.1 FL (ref 37–54)
EOSINOPHIL # BLD AUTO: 0.06 10*3/MM3 (ref 0–0.4)
EOSINOPHIL NFR BLD AUTO: 2.5 % (ref 0.3–6.2)
ERYTHROCYTE [DISTWIDTH] IN BLOOD BY AUTOMATED COUNT: 17.1 % (ref 12.3–15.4)
GFR SERPL CREATININE-BSD FRML MDRD: 16 ML/MIN/1.73
GLOBULIN UR ELPH-MCNC: 4.4 GM/DL (ref 1.8–3.5)
GLUCOSE SERPL-MCNC: 135 MG/DL (ref 74–124)
HCT VFR BLD AUTO: 26 % (ref 34–46.6)
HGB BLD-MCNC: 8.3 G/DL (ref 12–15.9)
IMM GRANULOCYTES # BLD AUTO: 0.02 10*3/MM3 (ref 0–0.05)
IMM GRANULOCYTES NFR BLD AUTO: 0.8 % (ref 0–0.5)
LYMPHOCYTES # BLD AUTO: 0.34 10*3/MM3 (ref 0.7–3.1)
LYMPHOCYTES NFR BLD AUTO: 14.3 % (ref 19.6–45.3)
MCH RBC QN AUTO: 31.1 PG (ref 26.6–33)
MCHC RBC AUTO-ENTMCNC: 31.9 G/DL (ref 31.5–35.7)
MCV RBC AUTO: 97.4 FL (ref 79–97)
MONOCYTES # BLD AUTO: 0.14 10*3/MM3 (ref 0.1–0.9)
MONOCYTES NFR BLD AUTO: 5.9 % (ref 5–12)
NEUTROPHILS NFR BLD AUTO: 1.77 10*3/MM3 (ref 1.7–7)
NEUTROPHILS NFR BLD AUTO: 74.8 % (ref 42.7–76)
NRBC BLD AUTO-RTO: 0 /100 WBC (ref 0–0.2)
PLATELET # BLD AUTO: 241 10*3/MM3 (ref 140–450)
PMV BLD AUTO: 8.9 FL (ref 6–12)
POTASSIUM SERPL-SCNC: 4 MMOL/L (ref 3.5–4.7)
PROT SERPL-MCNC: 7.6 G/DL (ref 6.3–8)
RBC # BLD AUTO: 2.67 10*6/MM3 (ref 3.77–5.28)
SODIUM SERPL-SCNC: 136 MMOL/L (ref 134–145)
WBC NRBC COR # BLD: 2.37 10*3/MM3 (ref 3.4–10.8)

## 2021-12-21 PROCEDURE — 36415 COLL VENOUS BLD VENIPUNCTURE: CPT

## 2021-12-21 PROCEDURE — 86300 IMMUNOASSAY TUMOR CA 15-3: CPT | Performed by: INTERNAL MEDICINE

## 2021-12-21 PROCEDURE — 85025 COMPLETE CBC W/AUTO DIFF WBC: CPT

## 2021-12-21 PROCEDURE — 80053 COMPREHEN METABOLIC PANEL: CPT

## 2021-12-21 PROCEDURE — 96402 CHEMO HORMON ANTINEOPL SQ/IM: CPT

## 2021-12-21 PROCEDURE — 25010000002 FULVESTRANT PER 25 MG: Performed by: NURSE PRACTITIONER

## 2021-12-21 PROCEDURE — 99214 OFFICE O/P EST MOD 30 MIN: CPT | Performed by: NURSE PRACTITIONER

## 2021-12-21 RX ADMIN — FULVESTRANT 500 MG: 250 INJECTION INTRAMUSCULAR at 10:12

## 2021-12-28 RX ORDER — AMOXICILLIN 500 MG/1
CAPSULE ORAL
Qty: 4 CAPSULE | Refills: 1 | Status: SHIPPED | OUTPATIENT
Start: 2021-12-28 | End: 2022-08-16

## 2022-01-01 ENCOUNTER — TELEPHONE (OUTPATIENT)
Dept: ONCOLOGY | Facility: CLINIC | Age: 62
End: 2022-01-01

## 2022-01-04 ENCOUNTER — CLINICAL SUPPORT (OUTPATIENT)
Dept: ONCOLOGY | Facility: HOSPITAL | Age: 62
End: 2022-01-04

## 2022-01-04 ENCOUNTER — LAB (OUTPATIENT)
Dept: LAB | Facility: HOSPITAL | Age: 62
End: 2022-01-04

## 2022-01-04 DIAGNOSIS — C50.919 METASTATIC BREAST CANCER: ICD-10-CM

## 2022-01-04 DIAGNOSIS — C50.919 METASTATIC BREAST CANCER: Primary | ICD-10-CM

## 2022-01-04 LAB
ABO GROUP BLD: NORMAL
BASOPHILS # BLD AUTO: 0.07 10*3/MM3 (ref 0–0.2)
BASOPHILS NFR BLD AUTO: 2.2 % (ref 0–1.5)
BLD GP AB SCN SERPL QL: NEGATIVE
DEPRECATED RDW RBC AUTO: 64 FL (ref 37–54)
EOSINOPHIL # BLD AUTO: 0.17 10*3/MM3 (ref 0–0.4)
EOSINOPHIL NFR BLD AUTO: 5.3 % (ref 0.3–6.2)
ERYTHROCYTE [DISTWIDTH] IN BLOOD BY AUTOMATED COUNT: 17.7 % (ref 12.3–15.4)
HCT VFR BLD AUTO: 23.4 % (ref 34–46.6)
HGB BLD-MCNC: 7.3 G/DL (ref 12–15.9)
IMM GRANULOCYTES # BLD AUTO: 0.02 10*3/MM3 (ref 0–0.05)
IMM GRANULOCYTES NFR BLD AUTO: 0.6 % (ref 0–0.5)
LYMPHOCYTES # BLD AUTO: 0.52 10*3/MM3 (ref 0.7–3.1)
LYMPHOCYTES NFR BLD AUTO: 16.2 % (ref 19.6–45.3)
MCH RBC QN AUTO: 31.2 PG (ref 26.6–33)
MCHC RBC AUTO-ENTMCNC: 31.2 G/DL (ref 31.5–35.7)
MCV RBC AUTO: 100 FL (ref 79–97)
MONOCYTES # BLD AUTO: 0.15 10*3/MM3 (ref 0.1–0.9)
MONOCYTES NFR BLD AUTO: 4.7 % (ref 5–12)
NEUTROPHILS NFR BLD AUTO: 2.28 10*3/MM3 (ref 1.7–7)
NEUTROPHILS NFR BLD AUTO: 71 % (ref 42.7–76)
NRBC BLD AUTO-RTO: 0 /100 WBC (ref 0–0.2)
PLATELET # BLD AUTO: 287 10*3/MM3 (ref 140–450)
PMV BLD AUTO: 8.7 FL (ref 6–12)
RBC # BLD AUTO: 2.34 10*6/MM3 (ref 3.77–5.28)
RH BLD: POSITIVE
T&S EXPIRATION DATE: NORMAL
WBC NRBC COR # BLD: 3.21 10*3/MM3 (ref 3.4–10.8)

## 2022-01-04 PROCEDURE — 86900 BLOOD TYPING SEROLOGIC ABO: CPT | Performed by: NURSE PRACTITIONER

## 2022-01-04 PROCEDURE — 86901 BLOOD TYPING SEROLOGIC RH(D): CPT | Performed by: NURSE PRACTITIONER

## 2022-01-04 PROCEDURE — 85025 COMPLETE CBC W/AUTO DIFF WBC: CPT

## 2022-01-04 PROCEDURE — 36415 COLL VENOUS BLD VENIPUNCTURE: CPT

## 2022-01-04 PROCEDURE — G0463 HOSPITAL OUTPT CLINIC VISIT: HCPCS

## 2022-01-04 PROCEDURE — 86923 COMPATIBILITY TEST ELECTRIC: CPT

## 2022-01-04 PROCEDURE — 86850 RBC ANTIBODY SCREEN: CPT | Performed by: NURSE PRACTITIONER

## 2022-01-04 RX ORDER — SODIUM CHLORIDE 9 MG/ML
250 INJECTION, SOLUTION INTRAVENOUS AS NEEDED
Status: CANCELLED | OUTPATIENT
Start: 2022-01-04

## 2022-01-04 RX ORDER — DIPHENHYDRAMINE HCL 25 MG
25 CAPSULE ORAL ONCE
Status: CANCELLED | OUTPATIENT
Start: 2022-01-04 | End: 2022-01-04

## 2022-01-04 RX ORDER — ACETAMINOPHEN 325 MG/1
650 TABLET ORAL ONCE
Status: CANCELLED | OUTPATIENT
Start: 2022-01-04 | End: 2022-01-04

## 2022-01-04 NOTE — NURSING NOTE
Pt here for CBC and RN review. Hgb dropped to 7.3 from 8.3 previously. Pt reports feeling very weak and SOA and knew her hgb was low. I consulted with Maritza Snider NP and she ordered for the patient to receive 2 units of blood. Pt was already type and screened in lab today. I entered orders into Epic and walked patient to scheduling. Pt reported tolerating Ibrance and this is currently her off week.    Lab Results   Component Value Date    WBC 3.21 (L) 01/04/2022    HGB 7.3 (C) 01/04/2022    HCT 23.4 (L) 01/04/2022    .0 (H) 01/04/2022     01/04/2022

## 2022-01-05 ENCOUNTER — HOSPITAL ENCOUNTER (OUTPATIENT)
Dept: INFUSION THERAPY | Facility: HOSPITAL | Age: 62
Discharge: HOME | End: 2022-01-05
Admitting: NURSE PRACTITIONER

## 2022-01-05 VITALS
SYSTOLIC BLOOD PRESSURE: 140 MMHG | HEART RATE: 71 BPM | OXYGEN SATURATION: 96 % | DIASTOLIC BLOOD PRESSURE: 74 MMHG | RESPIRATION RATE: 20 BRPM | TEMPERATURE: 97.3 F

## 2022-01-05 DIAGNOSIS — C50.919 METASTATIC BREAST CANCER: ICD-10-CM

## 2022-01-05 PROCEDURE — 86900 BLOOD TYPING SEROLOGIC ABO: CPT

## 2022-01-05 PROCEDURE — P9016 RBC LEUKOCYTES REDUCED: HCPCS

## 2022-01-05 PROCEDURE — 63710000001 DIPHENHYDRAMINE PER 50 MG: Performed by: NURSE PRACTITIONER

## 2022-01-05 PROCEDURE — 36430 TRANSFUSION BLD/BLD COMPNT: CPT

## 2022-01-05 RX ORDER — DIPHENHYDRAMINE HCL 25 MG
25 CAPSULE ORAL ONCE
Status: COMPLETED | OUTPATIENT
Start: 2022-01-05 | End: 2022-01-05

## 2022-01-05 RX ORDER — ACETAMINOPHEN 325 MG/1
650 TABLET ORAL ONCE
Status: COMPLETED | OUTPATIENT
Start: 2022-01-05 | End: 2022-01-05

## 2022-01-05 RX ORDER — SODIUM CHLORIDE 9 MG/ML
250 INJECTION, SOLUTION INTRAVENOUS AS NEEDED
Status: DISCONTINUED | OUTPATIENT
Start: 2022-01-05 | End: 2022-01-07 | Stop reason: HOSPADM

## 2022-01-05 RX ADMIN — DIPHENHYDRAMINE HYDROCHLORIDE 25 MG: 25 CAPSULE ORAL at 08:36

## 2022-01-05 RX ADMIN — ACETAMINOPHEN 650 MG: 325 TABLET, FILM COATED ORAL at 08:36

## 2022-01-05 NOTE — PROGRESS NOTES
Pt tolerated blood transfusion well and dc'ed per motorized wheelchair to main entrance for transport home per spouse.

## 2022-01-10 ENCOUNTER — HOSPITAL ENCOUNTER (OUTPATIENT)
Dept: PET IMAGING | Facility: HOSPITAL | Age: 62
End: 2022-01-10

## 2022-01-10 ENCOUNTER — TELEPHONE (OUTPATIENT)
Dept: ONCOLOGY | Facility: CLINIC | Age: 62
End: 2022-01-10

## 2022-01-10 ENCOUNTER — APPOINTMENT (OUTPATIENT)
Dept: PET IMAGING | Facility: HOSPITAL | Age: 62
End: 2022-01-10

## 2022-01-10 NOTE — TELEPHONE ENCOUNTER
Caller: Juana Hall    Relationship to patient: Self    Best call back number: [phone]    Chief complaint: NEEDS DIFFERENT APPT TIMES    Type of visit: LABS, FOLLOW UP, INJECTION    Requested date: SAME DATE LATER TIMES OF DIFFERENT DAY     If rescheduling, when is the original appointment: 1-18    Additional notes:PLEASE ADVISE

## 2022-01-11 ENCOUNTER — HOSPITAL ENCOUNTER (OUTPATIENT)
Dept: PET IMAGING | Facility: HOSPITAL | Age: 62
Discharge: HOME | End: 2022-01-11

## 2022-01-11 DIAGNOSIS — C50.919 METASTATIC BREAST CANCER: ICD-10-CM

## 2022-01-11 LAB
GLUCOSE BLDC GLUCOMTR-MCNC: 88 MG/DL (ref 70–130)
GLUCOSE BLDC GLUCOMTR-MCNC: 92 MG/DL (ref 70–130)

## 2022-01-11 PROCEDURE — 78815 PET IMAGE W/CT SKULL-THIGH: CPT

## 2022-01-11 PROCEDURE — A9552 F18 FDG: HCPCS | Performed by: INTERNAL MEDICINE

## 2022-01-11 PROCEDURE — 82962 GLUCOSE BLOOD TEST: CPT

## 2022-01-11 PROCEDURE — 0 FLUDEOXYGLUCOSE F18 SOLUTION: Performed by: INTERNAL MEDICINE

## 2022-01-11 RX ADMIN — FLUDEOXYGLUCOSE F18 1 DOSE: 300 INJECTION INTRAVENOUS at 12:28

## 2022-01-17 NOTE — PROGRESS NOTES
"Chief Complaint  Metastatic lobular breast cancer (ER/[place] positive, HER-2/tj negative), anemia secondary to CKD3, CHF, peripheral neuropathy, chemotherapy related nausea chemotherapy-induced myelosuppression    Subjective        History of Present Illness  The patient returns today in follow-up continuing currently on Faslodex and Ibrance.  She he is due today for cycle 3 day 1 Faslodex.  She is currently in the midst of a week of Ibrance, taking this at 100 mg daily for 7 days on followed by 7 days off.  She is currently on and off week of Ibrance, will resume Ibrance again on 1/24/2022.  She has laboratory studies and PET scan to review today.    In the interval since her last visit, she required transfusion support due to hemoglobin 7.5 on 12/3/2021 and again required 2 units PRBC with hemoglobin 7.3 on 1/4/2022.  She has been on a reduced dialysis schedule 2 times per week and has been receiving Epogen therefore only twice a week rather than 3 times a week as she was previously.  Currently she is on a trial off of dialysis since her last dialysis was performed on 1/11/2022.  She is continuing close follow-up with nephrology this week to determine whether she needs to resume dialysis at some point.  Therefore she has not received any Epogen since 1/11/2022.  She reports that she feels quite well off of dialysis.  She is tolerating Ibrance without any significant difficulties apart from intermittent fatigue that is often correlated with her anemia worsening.  She does feel better after recent transfusion.  She denies any nausea.  She has a chronically reduced appetite which is unchanged.  She notes normal bowel function.  She remains in a motorized scooter today as she has been in the past.  She denies any musculoskeletal pain, specifically no pain in the cervical spine.       Objective   Vital Signs:   /89   Pulse 94   Temp 97.5 °F (36.4 °C) (Temporal)   Resp 16   Ht 170.2 cm (67.01\")   Wt (!) 159 kg " (350 lb) Comment: states  SpO2 98%   BMI 54.80 kg/m²     Physical Exam  Constitutional:       Appearance: She is well-developed. She is obese.      Comments: Patient is in a motorized scooter today   Eyes:      Conjunctiva/sclera: Conjunctivae normal.   Neck:      Thyroid: No thyromegaly.   Cardiovascular:      Rate and Rhythm: Normal rate and regular rhythm.      Heart sounds: Murmur heard.   No friction rub. No gallop.       Comments: 2/6 murmur  Pulmonary:      Effort: No respiratory distress.      Breath sounds: Normal breath sounds.      Comments: Dialysis access in place in the right subclavian position  Chest:   Breasts:      Right: No supraclavicular adenopathy.      Left: No supraclavicular adenopathy.       Abdominal:      General: Bowel sounds are normal. There is no distension.      Palpations: Abdomen is soft.      Tenderness: There is no abdominal tenderness.   Musculoskeletal:         General: Swelling present.      Comments: Trace bilateral lower extremity edema   Lymphadenopathy:      Head:      Right side of head: No submandibular adenopathy.      Cervical: No cervical adenopathy.      Upper Body:      Right upper body: No supraclavicular adenopathy.      Left upper body: No supraclavicular adenopathy.   Skin:     General: Skin is warm and dry.      Findings: No bruising or rash.   Neurological:      Mental Status: She is alert and oriented to person, place, and time.      Cranial Nerves: No cranial nerve deficit.      Motor: No abnormal muscle tone.      Deep Tendon Reflexes: Reflexes normal.   Psychiatric:         Behavior: Behavior normal.        Patient was examined today, unchanged from above    Result Review : Reviewed CBC, CMP from today.  Reviewed PET scan 1/11/2022.  I did personally review PET scan images today with interpretation as outlined below.       Assessment and Plan    1. Metastatic lobular breast cancer (ER/AR positive, HER-2/tj negative):  · History of stage IIIC right  breast cancer ([id number])  · Patient treated previously in the [facility] system  · Diagnosis via excisional biopsy 8/23/2003 with lobular carcinoma, 4.5 cm, positive margins.  ER/PA positive, HER-2/tj negative.  · Staging evaluation in September 2003 with negative bone scan and negative CT scans.  Ejection fraction 65%.  · Received neoadjuvant chemotherapy (? GET regimen) which apparently included Taxotere and possibly epirubicin.  Received 6 cycles.  · 1/22/2004 bilateral mastectomy with right axillary dissection.  Per available records, 10-12 cm residual invasive lobular carcinoma in the breast with 14/16 lymph nodes positive with extranodal extension.  Immediate reconstruction.  · Received adjuvant chemotherapy with carboplatin and navelbine x4 cycles  · Initiated adjuvant tamoxifen 6/22/2004  · Adjuvant radiation therapy initiated but interrupted due to chest wall cellulitis requiring removal of implants in August 2004  · Implant reconstruction again attempted with MRSA infection, implant removed 12/30/2005 with no further attempts made and reconstruction.  · Completed 5 years tamoxifen in June 2009 and subsequently transitioned to Femara.  Received Femara until June 2014.  · Patient experienced postmenopausal vaginal bleeding in 2013, negative endometrial biopsy and gynecologic evaluation.  · Patient last seen in [facility] 6/18/2014  · CT chest performed to evaluate bicuspid aortic valve and dilated asending aorta 7/12/2019.  CT showed no change in aorta however showed new free intraperitoneal fluid in the upper abdomen with mesenteric edema, concerning for carcinomatosis.  · CT abdomen and pelvis (without IV contrast) on 7/16/2019 with mesenteric thickening, small volume of complex fluid in the mesentery which was suspicious and possibly representative of carcinomatosis, small amount of perihepatic fluid, small bowel loops tethered to omentum without obstruction, omental thickening, shotty retroperitoneal  and pelvic lymph nodes (non-pathologically enlarged).  · PET scan 7/26/2019 with hypermetabolic omental activity, hypermetabolic small retroperitoneal lymph nodes, hypermetabolic activity throughout uterus with increase in size.  · CT-guided omental biopsy 7/30/2019 with metastatic lobular carcinoma of breast primary, ER positive (greater than 95%), SC positive (90%), HER-2/tj negative (1+ IHC)  · Baseline CA 15-3 on 7/22/19 was 32.6  · Initiation of Aromasin 25 mg daily 8/12/2019.  Initiated Ibrance at 100 mg 21/28 days on 8/26/2019.  · Improvement in CA 15-3 on 9/17/19-26.3  · Following 2 cycles of Aromasin and Ibrance, CA 15-3 declined to 25.9 on 10/15/2019.  CT scan chest abdomen pelvis 10/16/2019 showed improvement in the mesenteric and omental thickening and near resolution of complex ascites.  Treatment continued.  · Stable findings on subsequent CT chest abdomen pelvis 12/11/2019.  Further improvement in CA 15-3-23.9 on 12/18/2019.  Ibrance/Aromasin continued.   · [facility] liquid biopsy 2/5/2020: MSI undetermined, mutations in BETH, NF1, CHEK2.  No evidence of PIK3CA mutation.  · Subsequent [facility] germline testing 11/12/2021 with BETH VUS (c.2864C>A) and POLE VUS (c.631A>T)  · Slight increase in CA 15-3 to 27.1 on 2/19/2020 and 29.1 on 3/18/2020.  CT however on 3/13/2020 with no new findings.  · Subsequent improvement in CA 15-3-26.5 on 5/15/2020  · CT 7/31/2020 showed evidence of stable disease.  CA 15-3 declined further to 23.1 on 7/24/2020.  · CT 10/19/2020 with stable disease.  Fluctuations in CA 15-3 of unclear significance.  · CA 15-3 on 1/6/2021 decreased to 23.  CT chest abdomen pelvis 1/29/2021 with stable findings.  · CA 15-3 on 4/1/2021 was 28.4  · CT 4/22/2021 with no evidence of progressive malignancy, notation of new focal linear subpleural opacification right upper lobe felt to be infectious and/or inflammatory.  Enlargement of 2 staghorn calculi left kidney.  Persistent left  perinephric stranding.    · Hospitalization 4/29-5/4/2021 with RYAN/CKD, UTI, anemia.  Required 2 unit PRBC transfusion and initiated hemodialysis Tuesday Thursday Saturday.  Brief interruption in Ibrance during hospitalization.  · CA 15-3 on 5/27/2021 was 27.8.  · CT chest abdomen pelvis 7/2/2021 showed no evidence of progression or new metastatic disease.  There was only one remaining left renal stone which was smaller than previous exam.  Stranding surrounding both kidneys left greater than right with left hydronephrosis and hydroureter.  · CA 15-3 7/7/2021 was 25.3  · Patient experienced severe ongoing anemia felt to be in part related to Ibrance requiring transfusion support intermittently.  Ibrance dose decreased on 8/23/2020 1 to 100 mg daily for 7 days on followed by 7 days off.  · Stable CA 15-3 on 9/1/2021 at 25.5 and again 10/13/2021 at 25.6.  · CT chest abdomen pelvis 10/26/2021 with increase in left hydronephrosis with increase in left perinephric and periureteral stranding. Decrease in stone in the left kidney lower pole (7 mm).  Stable small retroperitoneal lymph and left periaortic lymph nodes.  · PET scan 11/10/2021 with multiple bilateral hypermetabolic nodular pulmonary lesions, asymmetric moderate to severe left hydronephrosis with ill-defined fat stranding and soft tissue thickening in the renal sinus and surrounding the left ureter, hypermetabolic left retroperitoneal lymph node with slight increase in size, ill-defined hypermetabolic thickening in the inferior omentum with increase in size, uptake and C7 (indeterminate, recommended MRI).  Short segments of uptake in the mid and distal esophagus possibly related to gastritis.  · PET scan findings felt to be consistent with progressive malignancy.  Patient declined repeat omental biopsy.   · Options for further treatment discussed on 11/12/2021.  In light of renal failure and dialysis, options are more limited.  Recommendation to continue Ibrance  and discontinue Aromasin, begin Faslodex.  Discussed possible future use of single agent Taxol.    · Aromasin discontinued 11/12/2021  · On 11/22/2021 initiation of Faslodex with continuation of Ibrance (100 mg daily for 7 days on followed by 7 days off).  · MRI cervical spine 11/18/2021 showed the right C7 normality to likely represent metastatic disease.  With solitary bone lesion, did not recommend pursuit of bone modifying agent.  · Following 2 cycles Faslodex/Ibrance, PET scan on 1/11/2022 showed no change in the soft tissue superior to the dome of the bladder with hypermetabolic activity (likely related to carcinomatosis).  Significant decrease in injection of fat around the left renal pelvis and stable retroperitoneal hypermetabolic lymph nodes, decrease in size and activity in small bilateral pulmonary nodules.  Findings felt to represent evidence of response to treatment.  Plan repeat PET scan after 2-3 additional cycles Faslodex/Ibrance.  · The patient returns today in follow-up continuing on Faslodex and Ibrance.  She is due for cycle 3-day 1 Faslodex 500 mg IM today.  She continues on Ibrance 100 mg daily 7 days on followed by 7 days off and will be due to begin a new week of Ibrance on Monday, 1/24/2022.  The patient has PET scan to review following 2 cycles Faslodex/Ibrance with results as noted above that are felt to represent evidence of response.  She is tolerating treatment very well.  CA 15-3 is not very informative, was 25.6 on 10/13/2021 and 23.7 on 12/21/2021.  We will proceed on with treatment today as planned and I will see her back in 4 weeks when she is due to begin cycle 4 Faslodex/Ibrance.  We will discuss at that point whether to proceed with PET scan at the end of cycle 4 or possibly delayed to the end of cycle 5 depending on her clinical status.  2. Anemia secondary to ESRD, exacerbated by Ibrance:  · The patient appears to have a chronic anemia with hemoglobin in the 10-11 range with  normal MCV.  · Patient has underlying CKD3 with baseline creatinine recently in the 1.5-1.8 range.  · Anemia evaluation 7/22/2019 with iron 30, ferritin 85.2, iron saturation 10%, TIBC 311, B12 370, erythropoietin level 20.4  · Anemia felt in large part to be related to CKD3 as well as to underlying malignancy  · Evidence of folate deficiency 8/12/2019 with level 3.84, initiated folic acid 1 mg daily  · Additional labs on 12/18/2019 with iron 73, ferritin 82.2, iron saturation 25%, TIBC 290.  We did discuss the potential use of Procrit if her hemoglobin declines into the low 9/upper 8 range.  · Decline in hemoglobin into the 8 range, iron studies 7/20/2020 with iron 54, ferritin 66.7, iron saturation 16%, TIBC 328.  On 8/7/2020 proceeded with Injectafer 750 mg IV x1 dose.  Second dose not administered due to onset of fever, subsequent iron studies precluded further administration of IV iron.  · Initiated Procrit 20,000 units every 4 weeks on 9/4/2020.  · On 1/6/2021, hemoglobin declined significantly to 7.1.  This was repeated and verified at 7.2.  No evidence of GI blood loss, stool cards negative for occult blood x3 on 1/12/2021.  Additional labs on 1/6/2021 with iron 47, ferritin 430, iron saturation 20%, TIBC 235, folate greater than 20, B12 324, haptoglobin 525, .  · Transfused 2 unit PRBC on 1/7/2021  · Change in Procrit dosing to weekly  · Due to low normal B12 level initiated oral B12 1000 mcg daily.  · Patient with symptomatic decline in hemoglobin to 7.7 on 4/1/2021, received 1 unit PRBC transfusion  · Hospitalization 4/29-5/4/2021 with RYAN/CKD, UTI, anemia.  Required 2 unit PRBC transfusion and initiated hemodialysis Tuesday Thursday Saturday.  · Transfused 2 units PRBC for hemoglobin 7.2 on 5/27/2021  · Following initiation of hemodialysis, management of BAEU transitioned to nephrology, receiving Epogen with dialysis.  · On 7/21/2021, decline in hemoglobin to 6.5 requiring additional transfusion  support.  On 7/21/2021, additional anemia evaluation with iron 17, ferritin 646, iron saturation 9%, TIBC 193, folate greater than 20, B12 690.  Contacted Dr. Antonio in nephrology and confirmed that she was receiving maximum dose Epogen with dialysis.   · Patient received additional transfusion with hemoglobin down to 7.4 on 8/18/2021.  · Ongoing transfusion support prompted alteration in Ibrance dosing on 8/23/2021 as outlined above.  · After alteration in Ibrance dosing, hemoglobin improved and remained stable in the 9-10 range.  · Subsequent worsening of anemia with hemoglobin down to 7.4 on 10/13/2021 requiring transfusion 2 unit PRBC  · Additional labs 10/27/2021 with iron 14, ferritin seven or 97.4, iron saturation 7%, TIBC 213, folate greater than 20, CRP 18.06, B12 1452. Consistent with anemia secondary to chronic disease/malignancy and ESRD  · Stool negative for occult blood x3 on 11/2/2021  · Hemoglobin 7.4 on 11/22/2021, transfuse 1 unit PRBC  · Hemoglobin 7.5 on 12/3/2021, transfuse 1 unit PRBC  · Hemoglobin 7.3 on 1/4/2022, transfused 2 units PRBC  · Patient with reduced dialysis frequency to 2 days/week with concurrent decrease in Epogen dosing corresponding to increased transfusion requirement.  · Patient returns today noting that she is on a trial off of hemodialysis since last dialysis performed on 1/11/2022.  She therefore will not be receiving Epogen with dialysis.  I did contact Dr. Antonio and we are obtaining approval to resume Procrit 20,000 units weekly in our office and we will have patient return in 2 days to begin weekly Procrit.  We are checking iron panel and ferritin today.  If she resumes dialysis she will of course revert back to receiving Epogen with hemodialysis.  3. ESRD on HD:  · Baseline creatinine recently in 1.6-2.0 range  · On 9/10/2019, RYAN/CKD3 with creatinine up to 2.44, BUN of 40.  Received 1 L normal saline.  Patient with increase in oral fluid intake.  · Renal ultrasound  9/20/2019 with no evidence of obstructive uropathy.  · Diminished oral intake due to nausea from Ibrance, creatinine 2.79 with BUN 43 on 10/15/2019.  Received 1 L normal saline.  Repeat labs on 10/18/2019 with BUN 31, creatinine 2.0.  · During cycle 3 Ibrance, patient developed increase in creatinine on 11/6/2019 to 2.35 and received 1 L normal saline.  · Patient is trying to maintain adequate oral hydration.    · Gradual escalation of creatinine into the 2-2.4 range and subsequently into the 2.5-2.8 range  · Creatinine increased into the low 3 range in early January 2021.  Patient increased oral hydration with improvement back into the mid 2 range.  · Hospitalization 4/29-5/4/2021 with RYAN/CKD, UTI, anemia.  Required 2 unit PRBC transfusion and initiated hemodialysis Tuesday Thursday Saturday.   · Patient reports recent decrease in frequency of dialysis to twice per week on Tuesdays and Saturdays.  She continues to produce a significant amount of urine.    · Status post left upper extremity AV fistula placement on 11/3/2021 by vascular surgery  · Patient today notes that dialysis has been held since last dialysis on 1/11/2022 with close monitoring of her laboratory and clinical parameters.  4. CHF with mild to moderate aortic stenosis:  · Recent cardiology evaluation with echocardiogram 7/12/2019 showing ejection fraction 48% with moderate dilation of the LV cavity, global hypokinesis, grade 2 diastolic dysfunction, mild to moderate aortic stenosis, trivial pericardial effusion.  · CT chest 7/12/2019 with no change in the aorta compared to prior study 12/13/2017.  · Echocardiogram 7/19/2021 with ejection fraction 56%, left atrial volume moderately increased, grade 2 diastolic dysfunction, severe calcification aortic valve, moderate aortic stenosis, severe mitral calcification, mild mitral stenosis, marked elevation in RVSP from tricuspid regurgitation.  · Patient notes that she discussed echo results with  cardiology and valvular status was felt to be stable in regards to aortic stenosis.  5. Left upper and bilateral lower extremity peripheral neuropathy:  · Patient reports that left upper extremity neuropathy was not present from previous chemotherapy but occurred after hospitalization that required intubation and critical care stay.  Apparently felt to represent critical care neuropathy.  Improved over time.  · Bilateral lower extremity neuropathy felt to be related to diabetes  · May have future implications regarding treatment for breast cancer.  · Patient reports that peripheral neuropathy symptoms continue in the bilateral lower extremities, unchanged  6. Uterine/endometrial activity on PET scan:  · Patient experienced postmenopausal vaginal bleeding in 2013, negative endometrial biopsy and gynecologic evaluation.  · With findings on PET scan with enlarging uterus and some hypermetabolic activity, referred back to Dr. Oliveros and gynecology.    · 9/19/2019 D&C with hysteroscopy by Dr. Oliveros, no significant intraoperative findings, pathologic results with benign findings, no evidence of malignancy.  7. Nausea:  · Mild, relieved with Zofran.   · Patient experienced improvement in nausea after initiating hemodialysis  · No recent nausea  8. Myelosuppression secondary to Ibrance:  · With cycle 1, jessica WBC 2.49 with ANC 1.25 and platelet count 140,000.  · With cycle 2, jessica WBC 2.33 with ANC 1.06, maintained normal platelet count  · Today, WBC 3.39 with ANC 2.29, platelet count 321,000  9. Depression  · Patient with history of depression, worsened after the death of her son in November 2021  · With evidence of progressive malignancy in November 2021, patient agreeable to referral to supportive oncology  · Patient was seen in supportive oncology 11/18/2021, Zoloft increased from 50 up to 100 mg daily.  Prescribed armodafinil 50 mg daily however there or issues with insurance coverage  · Patient reports that her  symptoms have improved.  10. Left perinephric stranding secondary to malignancy with hydronephrosis:  · CT 4/22/2021 showed interval enlargement of 2 staghorn calculi in the left kidney with persistent left perinephric stranding  · Hospitalization 4/29-5/4/2021 with RYAN/CKD, UTI, anemia.  Required 2 unit PRBC transfusion and initiated hemodialysis Tuesday Thursday Saturday.    · Discussion from urology regarding potential need for surgical procedure to address staghorn calculus left kidney  · CT scan 7/2/2021 with only 1 remaining left renal stone identified which had decreased in size.  Patient appears to have passed the other stone.  · As above, CT 10/26/2021 with more prominent left hydronephrosis and increase in left perinephric and periureteral stranding.  Felt to possibly be related to passage of recent stone versus partial obstruction secondary to debris or thrombus in the left ureter versus pyelonephritis.  Patient however with no clinical evidence of recent stone passage nor pyelonephritis. Malignancy felt to be the cause of CT changes around the left kidney at this point.   · Left ureteral stent replacement 12/16/2021  · PET scan 1/11/2022 with decrease in fat injection near left renal pelvis, stent in satisfactory position.  Mild residual hydronephrosis on the left.  11. Right thyroid nodules  · Patient underwent prior thyroid biopsy by her report with benign findings many years ago, records not available  · On MRI cervical spine 11/18/2021, finding of 1.8 cm right thyroid nodule  · Thyroid ultrasound 11/26/2021 with right-sided thyroid nodules, 1 nodule was hypoechoic, solid measuring 2 cm with biopsy recommended.  · Thyroid ultrasound results reviewed with patient.  In light of her metastatic breast cancer, renal failure the significance of the thyroid nodule is felt to be much less.  We discussed possibility of thyroid biopsy however patient is inclined to forego this, especially since she has had a  biopsy performed in the past with benign findings by her report.    · No hypermetabolic activity identified on PET scan 1/11/2022 in the neck    Plan:  1. Patient will receive cycle 3-day 1 Faslodex 500 mg IM injection today  2. Continue Ibrance 100 mg daily for 7 days on followed by 7 days off (currently off Ibrance will resume week of Ibrance on 1/24/2022).  3. Additional labs today with iron panel/ferritin  4. Return on 1/20/2022 for CBC and resume Procrit 20,000 units weekly (patient off dialysis and will be receiving BEAU in our office for now)  5. On 1/27, 2/3, 2/10/2022 CBC, CMP and Procrit  6. Continue oral folic acid 1 mg daily, B12 1000 mcg daily.  7. Dr. Irvin visit on 2/17/2022 with CBC, CMP and cycle 4-day 1 Faslodex along with Procrit.    Patient continues on high risk medication requiring intensive monitoring.    I did spend 50 minutes with the patient today, time spent in review of records, face-to-face consultation, coordination of care, placement of orders, completion of documentation.    Addendum: Labs from today with BUN 65, creatinine 4.73, potassium 6.2.  Potassium was repeated on the sample and verified.  There was no hemolysis.  I contacted Dr. Antonio and arrangements are being made for the patient to proceed to her dialysis unit today to resume dialysis.  Also plans to resume Epogen with dialysis.  Therefore we will not have plans for patient to return to begin Procrit later this week.  We will check CBC in 2 weeks with RN review and again in 4 weeks when she returns for Faslodex.

## 2022-01-18 ENCOUNTER — TELEPHONE (OUTPATIENT)
Dept: ONCOLOGY | Facility: CLINIC | Age: 62
End: 2022-01-18

## 2022-01-18 ENCOUNTER — LAB (OUTPATIENT)
Dept: LAB | Facility: HOSPITAL | Age: 62
End: 2022-01-18

## 2022-01-18 ENCOUNTER — OFFICE VISIT (OUTPATIENT)
Dept: ONCOLOGY | Facility: CLINIC | Age: 62
End: 2022-01-18

## 2022-01-18 ENCOUNTER — INFUSION (OUTPATIENT)
Dept: ONCOLOGY | Facility: HOSPITAL | Age: 62
End: 2022-01-18

## 2022-01-18 VITALS
HEART RATE: 94 BPM | DIASTOLIC BLOOD PRESSURE: 89 MMHG | HEIGHT: 67 IN | RESPIRATION RATE: 16 BRPM | BODY MASS INDEX: 45.99 KG/M2 | SYSTOLIC BLOOD PRESSURE: 131 MMHG | WEIGHT: 293 LBS | TEMPERATURE: 97.5 F | OXYGEN SATURATION: 98 %

## 2022-01-18 DIAGNOSIS — D63.1 ANEMIA IN STAGE 3B CHRONIC KIDNEY DISEASE: Primary | ICD-10-CM

## 2022-01-18 DIAGNOSIS — N18.32 ANEMIA IN STAGE 3B CHRONIC KIDNEY DISEASE: Primary | ICD-10-CM

## 2022-01-18 DIAGNOSIS — C50.919 METASTATIC BREAST CANCER: ICD-10-CM

## 2022-01-18 DIAGNOSIS — C50.919 METASTATIC BREAST CANCER: Primary | ICD-10-CM

## 2022-01-18 LAB
ALBUMIN SERPL-MCNC: 3.3 G/DL (ref 3.5–5.2)
ALBUMIN/GLOB SERPL: 0.7 G/DL (ref 1.1–2.4)
ALP SERPL-CCNC: 129 U/L (ref 38–116)
ALT SERPL W P-5'-P-CCNC: 13 U/L (ref 0–33)
ANION GAP SERPL CALCULATED.3IONS-SCNC: 12.2 MMOL/L (ref 5–15)
AST SERPL-CCNC: 17 U/L (ref 0–32)
BASOPHILS # BLD AUTO: 0.07 10*3/MM3 (ref 0–0.2)
BASOPHILS NFR BLD AUTO: 2.1 % (ref 0–1.5)
BILIRUB SERPL-MCNC: 0.4 MG/DL (ref 0.2–1.2)
BUN SERPL-MCNC: 65 MG/DL (ref 6–20)
BUN/CREAT SERPL: 13.7 (ref 7.3–30)
CALCIUM SPEC-SCNC: 8.9 MG/DL (ref 8.5–10.2)
CHLORIDE SERPL-SCNC: 105 MMOL/L (ref 98–107)
CO2 SERPL-SCNC: 21.8 MMOL/L (ref 22–29)
CREAT SERPL-MCNC: 4.73 MG/DL (ref 0.6–1.1)
DEPRECATED RDW RBC AUTO: 69.3 FL (ref 37–54)
EOSINOPHIL # BLD AUTO: 0.12 10*3/MM3 (ref 0–0.4)
EOSINOPHIL NFR BLD AUTO: 3.5 % (ref 0.3–6.2)
ERYTHROCYTE [DISTWIDTH] IN BLOOD BY AUTOMATED COUNT: 18.6 % (ref 12.3–15.4)
FERRITIN SERPL-MCNC: 1131.5 NG/ML (ref 13–150)
GFR SERPL CREATININE-BSD FRML MDRD: 9 ML/MIN/1.73
GFR SERPL CREATININE-BSD FRML MDRD: ABNORMAL ML/MIN/{1.73_M2}
GLOBULIN UR ELPH-MCNC: 4.9 GM/DL (ref 1.8–3.5)
GLUCOSE SERPL-MCNC: 107 MG/DL (ref 74–124)
HCT VFR BLD AUTO: 26.6 % (ref 34–46.6)
HGB BLD-MCNC: 8.1 G/DL (ref 12–15.9)
IMM GRANULOCYTES # BLD AUTO: 0.03 10*3/MM3 (ref 0–0.05)
IMM GRANULOCYTES NFR BLD AUTO: 0.9 % (ref 0–0.5)
IRON 24H UR-MRATE: 95 MCG/DL (ref 37–145)
IRON SATN MFR SERPL: 40 % (ref 14–48)
LYMPHOCYTES # BLD AUTO: 0.7 10*3/MM3 (ref 0.7–3.1)
LYMPHOCYTES NFR BLD AUTO: 20.6 % (ref 19.6–45.3)
MCH RBC QN AUTO: 30.9 PG (ref 26.6–33)
MCHC RBC AUTO-ENTMCNC: 30.5 G/DL (ref 31.5–35.7)
MCV RBC AUTO: 101.5 FL (ref 79–97)
MONOCYTES # BLD AUTO: 0.18 10*3/MM3 (ref 0.1–0.9)
MONOCYTES NFR BLD AUTO: 5.3 % (ref 5–12)
NEUTROPHILS NFR BLD AUTO: 2.29 10*3/MM3 (ref 1.7–7)
NEUTROPHILS NFR BLD AUTO: 67.6 % (ref 42.7–76)
NRBC BLD AUTO-RTO: 0 /100 WBC (ref 0–0.2)
PLATELET # BLD AUTO: 321 10*3/MM3 (ref 140–450)
PMV BLD AUTO: 8.8 FL (ref 6–12)
POTASSIUM SERPL-SCNC: 6.2 MMOL/L (ref 3.5–4.7)
PROT SERPL-MCNC: 8.2 G/DL (ref 6.3–8)
RBC # BLD AUTO: 2.62 10*6/MM3 (ref 3.77–5.28)
SODIUM SERPL-SCNC: 139 MMOL/L (ref 134–145)
TIBC SERPL-MCNC: 238 MCG/DL (ref 249–505)
TRANSFERRIN SERPL-MCNC: 170 MG/DL (ref 200–360)
WBC NRBC COR # BLD: 3.39 10*3/MM3 (ref 3.4–10.8)

## 2022-01-18 PROCEDURE — 80053 COMPREHEN METABOLIC PANEL: CPT

## 2022-01-18 PROCEDURE — 25010000002 FULVESTRANT PER 25 MG: Performed by: INTERNAL MEDICINE

## 2022-01-18 PROCEDURE — 82728 ASSAY OF FERRITIN: CPT | Performed by: INTERNAL MEDICINE

## 2022-01-18 PROCEDURE — 84466 ASSAY OF TRANSFERRIN: CPT | Performed by: INTERNAL MEDICINE

## 2022-01-18 PROCEDURE — 36415 COLL VENOUS BLD VENIPUNCTURE: CPT

## 2022-01-18 PROCEDURE — 83540 ASSAY OF IRON: CPT | Performed by: INTERNAL MEDICINE

## 2022-01-18 PROCEDURE — 96402 CHEMO HORMON ANTINEOPL SQ/IM: CPT

## 2022-01-18 PROCEDURE — 85025 COMPLETE CBC W/AUTO DIFF WBC: CPT

## 2022-01-18 PROCEDURE — 99215 OFFICE O/P EST HI 40 MIN: CPT | Performed by: INTERNAL MEDICINE

## 2022-01-18 RX ADMIN — FULVESTRANT 500 MG: 250 INJECTION INTRAMUSCULAR at 10:35

## 2022-01-18 NOTE — TELEPHONE ENCOUNTER
Provider: DR HEATH  Caller: AZAEL ALEXANDER  Relationship to Patient: SELF  Phone Number:[phone]  Reason for Call: PT WANTS TO CANCEL 01/20 APPT

## 2022-01-18 NOTE — TELEPHONE ENCOUNTER
Phoned patient to let her know her potassium was critically high at 6.2 at today's office visit. Explained to patient that her potassium level needs immediate evaluation, as a level that high could result in emergent cardiac issues. Informed her that Dr. Irvin was contacting her nephrologist, Dr. Rk Ledesma. Per Dr. Irvin, Dr. Ledesma asks patient to report to her dialysis center as soon as possible to be dialyzed today. Dialysis center to contact patient. Advised patient that if she was unable to receive dialysis, that she would need to report to the emergency department for urgent evaluation. Patient v/u.

## 2022-01-19 ENCOUNTER — TELEPHONE (OUTPATIENT)
Dept: ONCOLOGY | Facility: CLINIC | Age: 62
End: 2022-01-19

## 2022-01-19 NOTE — TELEPHONE ENCOUNTER
CALLED PT ABOUT HER APPT DATE AND TIME AND SHE STATED THAT SHE HAS DIALYSIS ON MONDAYS, THURSDAYS AND SATURDAYS - AND THAT HER  IS HAVING A COLONOSCOPY ON 2/17/22 AS WELL, SHE COULD NOT MAKE THIS APPT ON THIS DAY SENT TO [facility] TO MAKE SURE THAT THIS WAS OK TO MOVE AS I COULD NOT TELL THAT THERE WOULD BE NO REASON WE COULD NOT MOVE.

## 2022-01-19 NOTE — TELEPHONE ENCOUNTER
Caller: Juana Hall    Relationship to patient: Self    Best call back number: [phone]    Type of visit: LAB, FOLLOW UP, AND INJECTION    Requested date: 02/16 OR 02/18    If rescheduling, when is the original appointment: 02/17    Additional notes: PLEASE CALL ONCE R/S.

## 2022-01-20 ENCOUNTER — APPOINTMENT (OUTPATIENT)
Dept: LAB | Facility: HOSPITAL | Age: 62
End: 2022-01-20

## 2022-01-20 ENCOUNTER — APPOINTMENT (OUTPATIENT)
Dept: ONCOLOGY | Facility: HOSPITAL | Age: 62
End: 2022-01-20

## 2022-01-25 RX ORDER — FOLIC ACID 1 MG/1
TABLET ORAL
Qty: 30 TABLET | Refills: 2 | Status: SHIPPED | OUTPATIENT
Start: 2022-01-25 | End: 2022-08-26

## 2022-01-27 ENCOUNTER — TELEPHONE (OUTPATIENT)
Dept: ONCOLOGY | Facility: CLINIC | Age: 62
End: 2022-01-27

## 2022-01-27 DIAGNOSIS — D63.1 ANEMIA IN STAGE 3B CHRONIC KIDNEY DISEASE: ICD-10-CM

## 2022-01-27 DIAGNOSIS — C50.919 METASTATIC BREAST CANCER: Primary | ICD-10-CM

## 2022-01-27 DIAGNOSIS — N18.32 ANEMIA IN STAGE 3B CHRONIC KIDNEY DISEASE: ICD-10-CM

## 2022-01-27 NOTE — TELEPHONE ENCOUNTER
Returned call to patient. Patient's  answered, stating patient was asleep.  reported that patient has been feeling her usual symptoms of anemia, including exhaustion, shortness of air, chills, and poor appetite. Advised  that we could have patient come in for a CBC with RN review tomorrow, and any determination of additional orders, such as a blood transfusion, would be based on patient's lab results.  v/u. Reviewed with Dr. Irvin. Order for CBC placed and message sent to scheduling.

## 2022-01-28 ENCOUNTER — CLINICAL SUPPORT (OUTPATIENT)
Dept: ONCOLOGY | Facility: HOSPITAL | Age: 62
End: 2022-01-28

## 2022-01-28 ENCOUNTER — HOSPITAL ENCOUNTER (INPATIENT)
Facility: HOSPITAL | Age: 62
LOS: 2 days | Discharge: HOME | End: 2022-01-30
Attending: EMERGENCY MEDICINE | Admitting: HOSPITALIST

## 2022-01-28 ENCOUNTER — APPOINTMENT (OUTPATIENT)
Dept: GENERAL RADIOLOGY | Facility: HOSPITAL | Age: 62
End: 2022-01-28

## 2022-01-28 ENCOUNTER — LAB (OUTPATIENT)
Dept: LAB | Facility: HOSPITAL | Age: 62
End: 2022-01-28

## 2022-01-28 DIAGNOSIS — D61.818 PANCYTOPENIA: ICD-10-CM

## 2022-01-28 DIAGNOSIS — R53.1 GENERALIZED WEAKNESS: Primary | ICD-10-CM

## 2022-01-28 DIAGNOSIS — D63.1 ANEMIA IN STAGE 3B CHRONIC KIDNEY DISEASE: ICD-10-CM

## 2022-01-28 DIAGNOSIS — U07.1 COVID-19 VIRUS INFECTION: ICD-10-CM

## 2022-01-28 DIAGNOSIS — C50.919 METASTATIC BREAST CANCER: ICD-10-CM

## 2022-01-28 DIAGNOSIS — N18.32 ANEMIA IN STAGE 3B CHRONIC KIDNEY DISEASE: ICD-10-CM

## 2022-01-28 PROBLEM — E66.01 OBESITY, MORBID, BMI 50 OR HIGHER (HCC): Status: ACTIVE | Noted: 2022-01-28

## 2022-01-28 PROBLEM — D61.810 PANCYTOPENIA DUE TO CHEMOTHERAPY: Status: ACTIVE | Noted: 2022-01-28

## 2022-01-28 PROBLEM — N18.6 ESRD (END STAGE RENAL DISEASE): Status: ACTIVE | Noted: 2022-01-28

## 2022-01-28 PROBLEM — E83.51 HYPOCALCEMIA: Status: ACTIVE | Noted: 2022-01-28

## 2022-01-28 LAB
ABO GROUP BLD: NORMAL
ALBUMIN SERPL-MCNC: 3 G/DL (ref 3.5–5.2)
ALBUMIN SERPL-MCNC: 3.3 G/DL (ref 3.5–5.2)
ALBUMIN/GLOB SERPL: 0.9 G/DL
ALP SERPL-CCNC: 95 U/L (ref 39–117)
ALP SERPL-CCNC: 99 U/L (ref 39–117)
ALT SERPL W P-5'-P-CCNC: 11 U/L (ref 1–33)
ALT SERPL W P-5'-P-CCNC: 11 U/L (ref 1–33)
ANION GAP SERPL CALCULATED.3IONS-SCNC: 13 MMOL/L (ref 5–15)
AST SERPL-CCNC: 11 U/L (ref 1–32)
AST SERPL-CCNC: 13 U/L (ref 1–32)
B PARAPERT DNA SPEC QL NAA+PROBE: NOT DETECTED
B PERT DNA SPEC QL NAA+PROBE: NOT DETECTED
BASOPHILS # BLD AUTO: 0.02 10*3/MM3 (ref 0–0.2)
BASOPHILS NFR BLD AUTO: 1 % (ref 0–1.5)
BILIRUB CONJ SERPL-MCNC: <0.2 MG/DL (ref 0–0.3)
BILIRUB INDIRECT SERPL-MCNC: ABNORMAL MG/DL
BILIRUB SERPL-MCNC: 0.3 MG/DL (ref 0–1.2)
BILIRUB SERPL-MCNC: 0.4 MG/DL (ref 0–1.2)
BLD GP AB SCN SERPL QL: NEGATIVE
BUN SERPL-MCNC: 53 MG/DL (ref 8–23)
BUN/CREAT SERPL: 13.3 (ref 7–25)
C PNEUM DNA NPH QL NAA+NON-PROBE: NOT DETECTED
CA-I BLD-MCNC: 4.6 MG/DL (ref 4.6–5.4)
CA-I SERPL ISE-MCNC: 1.14 MMOL/L (ref 1.15–1.35)
CALCIUM SPEC-SCNC: 7.9 MG/DL (ref 8.6–10.5)
CHLORIDE SERPL-SCNC: 101 MMOL/L (ref 98–107)
CO2 SERPL-SCNC: 20 MMOL/L (ref 22–29)
CREAT SERPL-MCNC: 4 MG/DL (ref 0.57–1)
CREAT SERPL-MCNC: 4.02 MG/DL (ref 0.57–1)
DEPRECATED RDW RBC AUTO: 67.7 FL (ref 37–54)
DEPRECATED RDW RBC AUTO: 72.9 FL (ref 37–54)
EOSINOPHIL # BLD AUTO: 0.04 10*3/MM3 (ref 0–0.4)
EOSINOPHIL # BLD MANUAL: 0.02 10*3/MM3 (ref 0–0.4)
EOSINOPHIL NFR BLD AUTO: 2.1 % (ref 0.3–6.2)
EOSINOPHIL NFR BLD MANUAL: 1 % (ref 0.3–6.2)
ERYTHROCYTE [DISTWIDTH] IN BLOOD BY AUTOMATED COUNT: 18.3 % (ref 12.3–15.4)
ERYTHROCYTE [DISTWIDTH] IN BLOOD BY AUTOMATED COUNT: 19.5 % (ref 12.3–15.4)
FLUAV SUBTYP SPEC NAA+PROBE: NOT DETECTED
FLUBV RNA ISLT QL NAA+PROBE: NOT DETECTED
GFR SERPL CREATININE-BSD FRML MDRD: 11 ML/MIN/1.73
GFR SERPL CREATININE-BSD FRML MDRD: 11 ML/MIN/1.73
GFR SERPL CREATININE-BSD FRML MDRD: ABNORMAL ML/MIN/{1.73_M2}
GFR SERPL CREATININE-BSD FRML MDRD: ABNORMAL ML/MIN/{1.73_M2}
GLOBULIN UR ELPH-MCNC: 3.8 GM/DL
GLUCOSE BLDC GLUCOMTR-MCNC: 164 MG/DL (ref 70–130)
GLUCOSE BLDC GLUCOMTR-MCNC: 227 MG/DL (ref 70–130)
GLUCOSE SERPL-MCNC: 202 MG/DL (ref 65–99)
HADV DNA SPEC NAA+PROBE: NOT DETECTED
HCOV 229E RNA SPEC QL NAA+PROBE: NOT DETECTED
HCOV HKU1 RNA SPEC QL NAA+PROBE: NOT DETECTED
HCOV NL63 RNA SPEC QL NAA+PROBE: NOT DETECTED
HCOV OC43 RNA SPEC QL NAA+PROBE: NOT DETECTED
HCT VFR BLD AUTO: 18.2 % (ref 34–46.6)
HCT VFR BLD AUTO: 19.8 % (ref 34–46.6)
HCT VFR BLD AUTO: 20.4 % (ref 34–46.6)
HGB BLD-MCNC: 5.9 G/DL (ref 12–15.9)
HGB BLD-MCNC: 6.1 G/DL (ref 12–15.9)
HGB BLD-MCNC: 6.3 G/DL (ref 12–15.9)
HMPV RNA NPH QL NAA+NON-PROBE: NOT DETECTED
HPIV1 RNA ISLT QL NAA+PROBE: NOT DETECTED
HPIV2 RNA SPEC QL NAA+PROBE: NOT DETECTED
HPIV3 RNA NPH QL NAA+PROBE: NOT DETECTED
HPIV4 P GENE NPH QL NAA+PROBE: NOT DETECTED
IMM GRANULOCYTES # BLD AUTO: 0.02 10*3/MM3 (ref 0–0.05)
IMM GRANULOCYTES NFR BLD AUTO: 1 % (ref 0–0.5)
INR PPP: 1.27 (ref 0.9–1.1)
LYMPHOCYTES # BLD AUTO: 0.46 10*3/MM3 (ref 0.7–3.1)
LYMPHOCYTES # BLD MANUAL: 0.38 10*3/MM3 (ref 0.7–3.1)
LYMPHOCYTES NFR BLD AUTO: 23.6 % (ref 19.6–45.3)
LYMPHOCYTES NFR BLD MANUAL: 13 % (ref 5–12)
M PNEUMO IGG SER IA-ACNC: NOT DETECTED
MCH RBC QN AUTO: 31 PG (ref 26.6–33)
MCH RBC QN AUTO: 31.8 PG (ref 26.6–33)
MCHC RBC AUTO-ENTMCNC: 30.8 G/DL (ref 31.5–35.7)
MCHC RBC AUTO-ENTMCNC: 30.9 G/DL (ref 31.5–35.7)
MCV RBC AUTO: 100.5 FL (ref 79–97)
MCV RBC AUTO: 103 FL (ref 79–97)
MONOCYTES # BLD AUTO: 0.27 10*3/MM3 (ref 0.1–0.9)
MONOCYTES # BLD: 0.26 10*3/MM3 (ref 0.1–0.9)
MONOCYTES NFR BLD AUTO: 13.8 % (ref 5–12)
NEUTROPHILS # BLD AUTO: 1.34 10*3/MM3 (ref 1.7–7)
NEUTROPHILS NFR BLD AUTO: 1.14 10*3/MM3 (ref 1.7–7)
NEUTROPHILS NFR BLD AUTO: 58.5 % (ref 42.7–76)
NEUTROPHILS NFR BLD MANUAL: 67 % (ref 42.7–76)
NRBC BLD AUTO-RTO: 0 /100 WBC (ref 0–0.2)
NT-PROBNP SERPL-MCNC: ABNORMAL PG/ML (ref 0–900)
PLAT MORPH BLD: NORMAL
PLATELET # BLD AUTO: 120 10*3/MM3 (ref 140–450)
PLATELET # BLD AUTO: 132 10*3/MM3 (ref 140–450)
PMV BLD AUTO: 10.2 FL (ref 6–12)
PMV BLD AUTO: 9.5 FL (ref 6–12)
POTASSIUM SERPL-SCNC: 4.3 MMOL/L (ref 3.5–5.2)
PROT SERPL-MCNC: 6.8 G/DL (ref 6–8.5)
PROT SERPL-MCNC: 7.1 G/DL (ref 6–8.5)
PROTHROMBIN TIME: 15.7 SECONDS (ref 11.7–14.2)
QT INTERVAL: 440 MS
RBC # BLD AUTO: 1.97 10*6/MM3 (ref 3.77–5.28)
RBC # BLD AUTO: 1.98 10*6/MM3 (ref 3.77–5.28)
RBC MORPH BLD: NORMAL
RH BLD: POSITIVE
RHINOVIRUS RNA SPEC NAA+PROBE: NOT DETECTED
RSV RNA NPH QL NAA+NON-PROBE: NOT DETECTED
SARS-COV-2 RNA NPH QL NAA+NON-PROBE: DETECTED
SODIUM SERPL-SCNC: 134 MMOL/L (ref 136–145)
T&S EXPIRATION DATE: NORMAL
VARIANT LYMPHS NFR BLD MANUAL: 19 % (ref 19.6–45.3)
WBC MORPH BLD: NORMAL
WBC NRBC COR # BLD: 1.95 10*3/MM3 (ref 3.4–10.8)
WBC NRBC COR # BLD: 2 10*3/MM3 (ref 3.4–10.8)

## 2022-01-28 PROCEDURE — 36415 COLL VENOUS BLD VENIPUNCTURE: CPT

## 2022-01-28 PROCEDURE — P9016 RBC LEUKOCYTES REDUCED: HCPCS

## 2022-01-28 PROCEDURE — 82248 BILIRUBIN DIRECT: CPT | Performed by: EMERGENCY MEDICINE

## 2022-01-28 PROCEDURE — G0463 HOSPITAL OUTPT CLINIC VISIT: HCPCS

## 2022-01-28 PROCEDURE — 86850 RBC ANTIBODY SCREEN: CPT | Performed by: EMERGENCY MEDICINE

## 2022-01-28 PROCEDURE — 25010000002 REMDESIVIR 100 MG/20ML SOLUTION 1 EACH VIAL: Performed by: NURSE PRACTITIONER

## 2022-01-28 PROCEDURE — 82330 ASSAY OF CALCIUM: CPT | Performed by: NURSE PRACTITIONER

## 2022-01-28 PROCEDURE — G0378 HOSPITAL OBSERVATION PER HR: HCPCS

## 2022-01-28 PROCEDURE — 0202U NFCT DS 22 TRGT SARS-COV-2: CPT | Performed by: EMERGENCY MEDICINE

## 2022-01-28 PROCEDURE — 63710000001 INSULIN LISPRO (HUMAN) PER 5 UNITS: Performed by: NURSE PRACTITIONER

## 2022-01-28 PROCEDURE — 85025 COMPLETE CBC W/AUTO DIFF WBC: CPT | Performed by: EMERGENCY MEDICINE

## 2022-01-28 PROCEDURE — 80053 COMPREHEN METABOLIC PANEL: CPT | Performed by: EMERGENCY MEDICINE

## 2022-01-28 PROCEDURE — 82962 GLUCOSE BLOOD TEST: CPT

## 2022-01-28 PROCEDURE — 82565 ASSAY OF CREATININE: CPT | Performed by: NURSE PRACTITIONER

## 2022-01-28 PROCEDURE — 86901 BLOOD TYPING SEROLOGIC RH(D): CPT | Performed by: EMERGENCY MEDICINE

## 2022-01-28 PROCEDURE — 94799 UNLISTED PULMONARY SVC/PX: CPT

## 2022-01-28 PROCEDURE — 99215 OFFICE O/P EST HI 40 MIN: CPT | Performed by: INTERNAL MEDICINE

## 2022-01-28 PROCEDURE — 93010 ELECTROCARDIOGRAM REPORT: CPT | Performed by: INTERNAL MEDICINE

## 2022-01-28 PROCEDURE — 85014 HEMATOCRIT: CPT | Performed by: NURSE PRACTITIONER

## 2022-01-28 PROCEDURE — 85018 HEMOGLOBIN: CPT | Performed by: NURSE PRACTITIONER

## 2022-01-28 PROCEDURE — 86900 BLOOD TYPING SEROLOGIC ABO: CPT

## 2022-01-28 PROCEDURE — 85007 BL SMEAR W/DIFF WBC COUNT: CPT | Performed by: EMERGENCY MEDICINE

## 2022-01-28 PROCEDURE — 93005 ELECTROCARDIOGRAM TRACING: CPT | Performed by: EMERGENCY MEDICINE

## 2022-01-28 PROCEDURE — 85610 PROTHROMBIN TIME: CPT | Performed by: NURSE PRACTITIONER

## 2022-01-28 PROCEDURE — 71045 X-RAY EXAM CHEST 1 VIEW: CPT

## 2022-01-28 PROCEDURE — 83880 ASSAY OF NATRIURETIC PEPTIDE: CPT | Performed by: EMERGENCY MEDICINE

## 2022-01-28 PROCEDURE — 86923 COMPATIBILITY TEST ELECTRIC: CPT

## 2022-01-28 PROCEDURE — 85025 COMPLETE CBC W/AUTO DIFF WBC: CPT

## 2022-01-28 PROCEDURE — 86900 BLOOD TYPING SEROLOGIC ABO: CPT | Performed by: EMERGENCY MEDICINE

## 2022-01-28 PROCEDURE — 99284 EMERGENCY DEPT VISIT MOD MDM: CPT

## 2022-01-28 PROCEDURE — 36430 TRANSFUSION BLD/BLD COMPNT: CPT

## 2022-01-28 RX ORDER — DEXTROSE MONOHYDRATE 25 G/50ML
25 INJECTION, SOLUTION INTRAVENOUS
Status: DISCONTINUED | OUTPATIENT
Start: 2022-01-28 | End: 2022-01-30 | Stop reason: HOSPADM

## 2022-01-28 RX ORDER — SODIUM CHLORIDE 0.9 % (FLUSH) 0.9 %
10 SYRINGE (ML) INJECTION AS NEEDED
Status: DISCONTINUED | OUTPATIENT
Start: 2022-01-28 | End: 2022-01-30 | Stop reason: HOSPADM

## 2022-01-28 RX ORDER — CHOLECALCIFEROL (VITAMIN D3) 125 MCG
1000 CAPSULE ORAL DAILY
Status: DISCONTINUED | OUTPATIENT
Start: 2022-01-28 | End: 2022-01-30 | Stop reason: HOSPADM

## 2022-01-28 RX ORDER — BISACODYL 10 MG
10 SUPPOSITORY, RECTAL RECTAL DAILY PRN
Status: DISCONTINUED | OUTPATIENT
Start: 2022-01-28 | End: 2022-01-28

## 2022-01-28 RX ORDER — AMOXICILLIN 250 MG
2 CAPSULE ORAL 2 TIMES DAILY PRN
Status: DISCONTINUED | OUTPATIENT
Start: 2022-01-28 | End: 2022-01-30 | Stop reason: HOSPADM

## 2022-01-28 RX ORDER — NICOTINE POLACRILEX 4 MG
15 LOZENGE BUCCAL
Status: DISCONTINUED | OUTPATIENT
Start: 2022-01-28 | End: 2022-01-30 | Stop reason: HOSPADM

## 2022-01-28 RX ORDER — ERGOCALCIFEROL 1.25 MG/1
50000 CAPSULE ORAL
Status: DISCONTINUED | OUTPATIENT
Start: 2022-02-02 | End: 2022-01-30 | Stop reason: HOSPADM

## 2022-01-28 RX ORDER — AMLODIPINE BESYLATE 5 MG/1
5 TABLET ORAL DAILY
Status: DISCONTINUED | OUTPATIENT
Start: 2022-01-28 | End: 2022-01-30 | Stop reason: HOSPADM

## 2022-01-28 RX ORDER — ACETAMINOPHEN 650 MG/1
650 SUPPOSITORY RECTAL EVERY 4 HOURS PRN
Status: DISCONTINUED | OUTPATIENT
Start: 2022-01-28 | End: 2022-01-30 | Stop reason: HOSPADM

## 2022-01-28 RX ORDER — CARVEDILOL 3.12 MG/1
3.12 TABLET ORAL 2 TIMES DAILY
Status: DISCONTINUED | OUTPATIENT
Start: 2022-01-28 | End: 2022-01-30 | Stop reason: HOSPADM

## 2022-01-28 RX ORDER — POLYETHYLENE GLYCOL 3350 17 G/17G
17 POWDER, FOR SOLUTION ORAL DAILY PRN
Status: DISCONTINUED | OUTPATIENT
Start: 2022-01-28 | End: 2022-01-28

## 2022-01-28 RX ORDER — TRAZODONE HYDROCHLORIDE 50 MG/1
50 TABLET ORAL NIGHTLY
Status: DISCONTINUED | OUTPATIENT
Start: 2022-01-28 | End: 2022-01-30 | Stop reason: HOSPADM

## 2022-01-28 RX ORDER — SODIUM CHLORIDE 0.9 % (FLUSH) 0.9 %
10 SYRINGE (ML) INJECTION EVERY 12 HOURS SCHEDULED
Status: DISCONTINUED | OUTPATIENT
Start: 2022-01-28 | End: 2022-01-30 | Stop reason: HOSPADM

## 2022-01-28 RX ORDER — ATORVASTATIN CALCIUM 20 MG/1
40 TABLET, FILM COATED ORAL NIGHTLY
Status: DISCONTINUED | OUTPATIENT
Start: 2022-01-28 | End: 2022-01-30 | Stop reason: HOSPADM

## 2022-01-28 RX ORDER — LEVOTHYROXINE SODIUM 0.05 MG/1
50 TABLET ORAL DAILY
Status: DISCONTINUED | OUTPATIENT
Start: 2022-01-28 | End: 2022-01-30 | Stop reason: HOSPADM

## 2022-01-28 RX ORDER — ACETAMINOPHEN 325 MG/1
650 TABLET ORAL EVERY 4 HOURS PRN
Status: DISCONTINUED | OUTPATIENT
Start: 2022-01-28 | End: 2022-01-30 | Stop reason: HOSPADM

## 2022-01-28 RX ORDER — BISACODYL 5 MG/1
5 TABLET, DELAYED RELEASE ORAL DAILY PRN
Status: DISCONTINUED | OUTPATIENT
Start: 2022-01-28 | End: 2022-01-30 | Stop reason: HOSPADM

## 2022-01-28 RX ORDER — POLYETHYLENE GLYCOL 3350 17 G/17G
17 POWDER, FOR SOLUTION ORAL DAILY PRN
Status: DISCONTINUED | OUTPATIENT
Start: 2022-01-28 | End: 2022-01-30 | Stop reason: HOSPADM

## 2022-01-28 RX ORDER — ACETAMINOPHEN 160 MG/5ML
650 SOLUTION ORAL EVERY 4 HOURS PRN
Status: DISCONTINUED | OUTPATIENT
Start: 2022-01-28 | End: 2022-01-30 | Stop reason: HOSPADM

## 2022-01-28 RX ORDER — NITROGLYCERIN 0.4 MG/1
0.4 TABLET SUBLINGUAL
Status: DISCONTINUED | OUTPATIENT
Start: 2022-01-28 | End: 2022-01-30 | Stop reason: HOSPADM

## 2022-01-28 RX ORDER — FOLIC ACID 1 MG/1
1000 TABLET ORAL DAILY
Status: DISCONTINUED | OUTPATIENT
Start: 2022-01-28 | End: 2022-01-30 | Stop reason: HOSPADM

## 2022-01-28 RX ORDER — ONDANSETRON 2 MG/ML
4 INJECTION INTRAMUSCULAR; INTRAVENOUS EVERY 6 HOURS PRN
Status: DISCONTINUED | OUTPATIENT
Start: 2022-01-28 | End: 2022-01-30 | Stop reason: HOSPADM

## 2022-01-28 RX ORDER — BISACODYL 10 MG
10 SUPPOSITORY, RECTAL RECTAL DAILY PRN
Status: DISCONTINUED | OUTPATIENT
Start: 2022-01-28 | End: 2022-01-30 | Stop reason: HOSPADM

## 2022-01-28 RX ORDER — SERTRALINE HYDROCHLORIDE 100 MG/1
200 TABLET, FILM COATED ORAL NIGHTLY
Status: DISCONTINUED | OUTPATIENT
Start: 2022-01-28 | End: 2022-01-30 | Stop reason: HOSPADM

## 2022-01-28 RX ORDER — INSULIN LISPRO 100 [IU]/ML
0-9 INJECTION, SOLUTION INTRAVENOUS; SUBCUTANEOUS
Status: DISCONTINUED | OUTPATIENT
Start: 2022-01-28 | End: 2022-01-30 | Stop reason: HOSPADM

## 2022-01-28 RX ORDER — AMOXICILLIN 250 MG
2 CAPSULE ORAL 2 TIMES DAILY
Status: DISCONTINUED | OUTPATIENT
Start: 2022-01-28 | End: 2022-01-28

## 2022-01-28 RX ORDER — BISACODYL 5 MG/1
5 TABLET, DELAYED RELEASE ORAL DAILY PRN
Status: DISCONTINUED | OUTPATIENT
Start: 2022-01-28 | End: 2022-01-28

## 2022-01-28 RX ORDER — ONDANSETRON 4 MG/1
4 TABLET, FILM COATED ORAL EVERY 6 HOURS PRN
Status: DISCONTINUED | OUTPATIENT
Start: 2022-01-28 | End: 2022-01-30 | Stop reason: HOSPADM

## 2022-01-28 RX ADMIN — INSULIN GLARGINE-YFGN 30 UNITS: 100 INJECTION, SOLUTION SUBCUTANEOUS at 17:05

## 2022-01-28 RX ADMIN — INSULIN LISPRO 4 UNITS: 100 INJECTION, SOLUTION INTRAVENOUS; SUBCUTANEOUS at 21:31

## 2022-01-28 RX ADMIN — TRAZODONE HYDROCHLORIDE 50 MG: 50 TABLET ORAL at 21:03

## 2022-01-28 RX ADMIN — CARVEDILOL 3.12 MG: 3.12 TABLET, FILM COATED ORAL at 21:02

## 2022-01-28 RX ADMIN — SERTRALINE 200 MG: 100 TABLET, FILM COATED ORAL at 21:02

## 2022-01-28 RX ADMIN — SODIUM CHLORIDE, PRESERVATIVE FREE 10 ML: 5 INJECTION INTRAVENOUS at 14:45

## 2022-01-28 RX ADMIN — LEVOTHYROXINE SODIUM 50 MCG: 0.05 TABLET ORAL at 15:36

## 2022-01-28 RX ADMIN — REMDESIVIR 200 MG: 100 INJECTION, POWDER, LYOPHILIZED, FOR SOLUTION INTRAVENOUS at 14:42

## 2022-01-28 RX ADMIN — AMLODIPINE BESYLATE 5 MG: 5 TABLET ORAL at 15:36

## 2022-01-28 RX ADMIN — ATORVASTATIN CALCIUM 40 MG: 20 TABLET, FILM COATED ORAL at 21:02

## 2022-01-28 RX ADMIN — Medication 1000 MCG: at 15:36

## 2022-01-28 RX ADMIN — INSULIN LISPRO 2 UNITS: 100 INJECTION, SOLUTION INTRAVENOUS; SUBCUTANEOUS at 16:09

## 2022-01-28 RX ADMIN — SODIUM CHLORIDE, PRESERVATIVE FREE 10 ML: 5 INJECTION INTRAVENOUS at 21:03

## 2022-01-28 RX ADMIN — FOLIC ACID 1000 MCG: 1 TABLET ORAL at 15:36

## 2022-01-28 RX ADMIN — ACETAMINOPHEN 650 MG: 325 TABLET ORAL at 21:02

## 2022-01-28 NOTE — NURSING NOTE
Pt here for CBC and RN review. CBC reviewed with pt. Hgb 6.3. Pt c/o fatigue, weakness, SOA, chills, vomiting, diarrhea. Asked pt if she has tested for covid and pt denied. Reviewed with Nps and charge RN came to conclusion that pt needs to be seen in ER for covid swab and blood transfusion. Told pt and she v/u and is going to ER at this time.         Lab Results   Component Value Date    WBC 1.95 (L) 01/28/2022    HGB 6.3 (C) 01/28/2022    HCT 20.4 (L) 01/28/2022    .0 (H) 01/28/2022     (L) 01/28/2022

## 2022-01-29 ENCOUNTER — APPOINTMENT (OUTPATIENT)
Dept: CARDIOLOGY | Facility: HOSPITAL | Age: 62
End: 2022-01-29

## 2022-01-29 LAB
25(OH)D3 SERPL-MCNC: 43 NG/ML (ref 30–100)
ADV 40+41 DNA STL QL NAA+NON-PROBE: NOT DETECTED
ALBUMIN SERPL-MCNC: 2.7 G/DL (ref 3.5–5.2)
ALP SERPL-CCNC: 88 U/L (ref 39–117)
ALT SERPL W P-5'-P-CCNC: 10 U/L (ref 1–33)
ANION GAP SERPL CALCULATED.3IONS-SCNC: 12.9 MMOL/L (ref 5–15)
AST SERPL-CCNC: 11 U/L (ref 1–32)
ASTRO TYP 1-8 RNA STL QL NAA+NON-PROBE: NOT DETECTED
BH CV VAS DIALYSIS ARTERIAL ANASTOMOSIS DIAMETER: 0.64 CM
BH CV VAS DIALYSIS ARTERIAL ANASTOMOSIS EDV: 399 CM/SEC
BH CV VAS DIALYSIS ARTERIAL ANASTOMOSIS PSV: 708 CM/SEC
BH CV VAS DIALYSIS CONDUIT DIST DEPTH: 1.28 CM
BH CV VAS DIALYSIS CONDUIT DIST DIAMETER: 0.59 CM
BH CV VAS DIALYSIS CONDUIT DIST EDV: 98 CM/SEC
BH CV VAS DIALYSIS CONDUIT DIST FLOW VOL: 1066 ML/MIN
BH CV VAS DIALYSIS CONDUIT DIST PSV: 147 CM/SEC
BH CV VAS DIALYSIS CONDUIT MID DEPTH: 1.3 CM
BH CV VAS DIALYSIS CONDUIT MID DIAMETER: 0.47 CM
BH CV VAS DIALYSIS CONDUIT MID EDV: 78 CM/SEC
BH CV VAS DIALYSIS CONDUIT MID FLOW VOL: 1618 ML/MIN
BH CV VAS DIALYSIS CONDUIT MID PSV: 141 CM/SEC
BH CV VAS DIALYSIS CONDUIT MID/DIST DEPTH: 1.3 CM
BH CV VAS DIALYSIS CONDUIT MID/DIST DIAMETER: 0.6 CM
BH CV VAS DIALYSIS CONDUIT MID/DIST EDV: 49 CM/SEC
BH CV VAS DIALYSIS CONDUIT MID/DIST FLOW VOL: 1410 ML/MIN
BH CV VAS DIALYSIS CONDUIT MID/DIST PSV: 106 CM/SEC
BH CV VAS DIALYSIS CONDUIT PROX DEPTH: 0.16 CM
BH CV VAS DIALYSIS CONDUIT PROX DIAMETER: 0.84 CM
BH CV VAS DIALYSIS CONDUIT PROX EDV: 300 CM/SEC
BH CV VAS DIALYSIS CONDUIT PROX PSV: 626 CM/SEC
BH CV VAS DIALYSIS CONDUIT PROX/MID DEPTH: 0.16 CM
BH CV VAS DIALYSIS CONDUIT PROX/MID DIAMETER: 0.86 CM
BH CV VAS DIALYSIS CONDUIT PROX/MID EDV: 49 CM/SEC
BH CV VAS DIALYSIS CONDUIT PROX/MID FLOW VOL: 1341 ML/MIN
BH CV VAS DIALYSIS CONDUIT PROX/MID PSV: 103 CM/SEC
BH CV VAS DIALYSIS PRE-INFLOW BRACHIAL DIAMETER: 0.58 CM
BH CV VAS DIALYSIS PRE-INFLOW BRACHIAL EDV: 72 CM/SEC
BH CV VAS DIALYSIS PRE-INFLOW BRACHIAL FLOW VOL: 761 ML/MIN
BH CV VAS DIALYSIS PRE-INFLOW BRACHIAL PSV: 168 CM/SEC
BH CV VAS DIALYSIS PRE-INFLOW RADIAL EDV: 9 CM/SEC
BH CV VAS DIALYSIS PRE-INFLOW RADIAL PSV: 54 CM/SEC
BH CV VAS DIALYSIS PRE-INFLOW SUBCLAV PSV: 182 CM/SEC
BH CV VAS DIALYSIS PRE-INFLOW SUBCLAVIAN DIAMETER: 0.72 CM
BH CV VAS DIALYSIS PRE-INFLOW SUBCLAVIAN EDV: 81 CM/SEC
BH CV VAS DIALYSIS VENOUS OUTFLOW CEPHALIC ARCH DIAMETE: 0.64 CM
BH CV VAS DIALYSIS VENOUS OUTFLOW CEPHALIC ARCH EDV: 114 CM/SEC
BH CV VAS DIALYSIS VENOUS OUTFLOW CEPHALIC ARCH PSV: 172 CM/SEC
BH CV VAS DIALYSIS VENOUS OUTFLOW SUBCLAVIAN DIAMETER: 1 CM
BH CV VAS DIALYSIS VENOUS OUTFLOW SUBCLAVIAN EDV: 97 CM/SEC
BH CV VAS DIALYSIS VENOUS OUTFLOW SUBCLAVIAN PSV: 215 CM/SEC
BILIRUB CONJ SERPL-MCNC: <0.2 MG/DL (ref 0–0.3)
BILIRUB INDIRECT SERPL-MCNC: ABNORMAL MG/DL
BILIRUB SERPL-MCNC: 0.3 MG/DL (ref 0–1.2)
BUN SERPL-MCNC: 57 MG/DL (ref 8–23)
BUN/CREAT SERPL: 15.1 (ref 7–25)
C CAYETANENSIS DNA STL QL NAA+NON-PROBE: NOT DETECTED
C COLI+JEJ+UPSA DNA STL QL NAA+NON-PROBE: NOT DETECTED
C DIFF TOX GENS STL QL NAA+PROBE: POSITIVE
CALCIUM SPEC-SCNC: 7.9 MG/DL (ref 8.6–10.5)
CHLORIDE SERPL-SCNC: 105 MMOL/L (ref 98–107)
CO2 SERPL-SCNC: 19.1 MMOL/L (ref 22–29)
CREAT SERPL-MCNC: 3.78 MG/DL (ref 0.57–1)
CRYPTOSP DNA STL QL NAA+NON-PROBE: NOT DETECTED
DEPRECATED RDW RBC AUTO: 59 FL (ref 37–54)
E HISTOLYT DNA STL QL NAA+NON-PROBE: NOT DETECTED
EAEC PAA PLAS AGGR+AATA ST NAA+NON-PRB: NOT DETECTED
EC STX1+STX2 GENES STL QL NAA+NON-PROBE: NOT DETECTED
EPEC EAE GENE STL QL NAA+NON-PROBE: NOT DETECTED
ERYTHROCYTE [DISTWIDTH] IN BLOOD BY AUTOMATED COUNT: 17.9 % (ref 12.3–15.4)
ETEC LTA+ST1A+ST1B TOX ST NAA+NON-PROBE: NOT DETECTED
G LAMBLIA DNA STL QL NAA+NON-PROBE: NOT DETECTED
GFR SERPL CREATININE-BSD FRML MDRD: 12 ML/MIN/1.73
GFR SERPL CREATININE-BSD FRML MDRD: ABNORMAL ML/MIN/{1.73_M2}
GLUCOSE BLDC GLUCOMTR-MCNC: 123 MG/DL (ref 70–130)
GLUCOSE BLDC GLUCOMTR-MCNC: 142 MG/DL (ref 70–130)
GLUCOSE BLDC GLUCOMTR-MCNC: 200 MG/DL (ref 70–130)
GLUCOSE BLDC GLUCOMTR-MCNC: 200 MG/DL (ref 70–130)
GLUCOSE SERPL-MCNC: 120 MG/DL (ref 65–99)
HBA1C MFR BLD: 6 % (ref 4.8–5.6)
HBV SURFACE AG SERPL QL IA: NORMAL
HCT VFR BLD AUTO: 20.1 % (ref 34–46.6)
HCT VFR BLD AUTO: 20.2 % (ref 34–46.6)
HEMOCCULT STL QL: POSITIVE
HGB BLD-MCNC: 6.6 G/DL (ref 12–15.9)
HGB BLD-MCNC: 6.8 G/DL (ref 12–15.9)
MAXIMAL PREDICTED HEART RATE: 157 BPM
MCH RBC QN AUTO: 31.1 PG (ref 26.6–33)
MCHC RBC AUTO-ENTMCNC: 33.7 G/DL (ref 31.5–35.7)
MCV RBC AUTO: 92.2 FL (ref 79–97)
NOROVIRUS GI+II RNA STL QL NAA+NON-PROBE: NOT DETECTED
P SHIGELLOIDES DNA STL QL NAA+NON-PROBE: NOT DETECTED
PHOSPHATE SERPL-MCNC: 5.6 MG/DL (ref 2.5–4.5)
PLATELET # BLD AUTO: 122 10*3/MM3 (ref 140–450)
PMV BLD AUTO: 9.9 FL (ref 6–12)
POTASSIUM SERPL-SCNC: 4.6 MMOL/L (ref 3.5–5.2)
PROT SERPL-MCNC: 6.4 G/DL (ref 6–8.5)
RBC # BLD AUTO: 2.19 10*6/MM3 (ref 3.77–5.28)
RVA RNA STL QL NAA+NON-PROBE: NOT DETECTED
S ENT+BONG DNA STL QL NAA+NON-PROBE: NOT DETECTED
SAPO I+II+IV+V RNA STL QL NAA+NON-PROBE: NOT DETECTED
SHIGELLA SP+EIEC IPAH ST NAA+NON-PROBE: NOT DETECTED
SODIUM SERPL-SCNC: 137 MMOL/L (ref 136–145)
STRESS TARGET HR: 133 BPM
V CHOL+PARA+VUL DNA STL QL NAA+NON-PROBE: NOT DETECTED
V CHOLERAE DNA STL QL NAA+NON-PROBE: NOT DETECTED
WBC NRBC COR # BLD: 1.9 10*3/MM3 (ref 3.4–10.8)
Y ENTEROCOL DNA STL QL NAA+NON-PROBE: NOT DETECTED

## 2022-01-29 PROCEDURE — 83036 HEMOGLOBIN GLYCOSYLATED A1C: CPT | Performed by: NURSE PRACTITIONER

## 2022-01-29 PROCEDURE — 94799 UNLISTED PULMONARY SVC/PX: CPT

## 2022-01-29 PROCEDURE — 0097U HC BIOFIRE FILMARRAY GI PANEL: CPT | Performed by: HOSPITALIST

## 2022-01-29 PROCEDURE — 25010000002 REMDESIVIR 100 MG/20ML SOLUTION 1 EACH VIAL: Performed by: NURSE PRACTITIONER

## 2022-01-29 PROCEDURE — XW033E5 INTRODUCTION OF REMDESIVIR ANTI-INFECTIVE INTO PERIPHERAL VEIN, PERCUTANEOUS APPROACH, NEW TECHNOLOGY GROUP 5: ICD-10-PCS | Performed by: HOSPITALIST

## 2022-01-29 PROCEDURE — 93990 DOPPLER FLOW TESTING: CPT

## 2022-01-29 PROCEDURE — 80048 BASIC METABOLIC PNL TOTAL CA: CPT | Performed by: NURSE PRACTITIONER

## 2022-01-29 PROCEDURE — 99232 SBSQ HOSP IP/OBS MODERATE 35: CPT | Performed by: INTERNAL MEDICINE

## 2022-01-29 PROCEDURE — 80076 HEPATIC FUNCTION PANEL: CPT | Performed by: NURSE PRACTITIONER

## 2022-01-29 PROCEDURE — 25010000002 EPOETIN ALFA-EPBX 10000 UNIT/ML SOLUTION: Performed by: INTERNAL MEDICINE

## 2022-01-29 PROCEDURE — 82272 OCCULT BLD FECES 1-3 TESTS: CPT | Performed by: NURSE PRACTITIONER

## 2022-01-29 PROCEDURE — 86900 BLOOD TYPING SEROLOGIC ABO: CPT

## 2022-01-29 PROCEDURE — P9016 RBC LEUKOCYTES REDUCED: HCPCS

## 2022-01-29 PROCEDURE — 87493 C DIFF AMPLIFIED PROBE: CPT | Performed by: HOSPITALIST

## 2022-01-29 PROCEDURE — 63710000001 INSULIN LISPRO (HUMAN) PER 5 UNITS: Performed by: NURSE PRACTITIONER

## 2022-01-29 PROCEDURE — 87340 HEPATITIS B SURFACE AG IA: CPT | Performed by: INTERNAL MEDICINE

## 2022-01-29 PROCEDURE — 82962 GLUCOSE BLOOD TEST: CPT

## 2022-01-29 PROCEDURE — 84100 ASSAY OF PHOSPHORUS: CPT | Performed by: HOSPITALIST

## 2022-01-29 PROCEDURE — 85027 COMPLETE CBC AUTOMATED: CPT | Performed by: NURSE PRACTITIONER

## 2022-01-29 PROCEDURE — 82306 VITAMIN D 25 HYDROXY: CPT | Performed by: HOSPITALIST

## 2022-01-29 PROCEDURE — 5A1D70Z PERFORMANCE OF URINARY FILTRATION, INTERMITTENT, LESS THAN 6 HOURS PER DAY: ICD-10-PCS | Performed by: INTERNAL MEDICINE

## 2022-01-29 RX ORDER — HEPARIN SODIUM 1000 [USP'U]/ML
2000 INJECTION, SOLUTION INTRAVENOUS; SUBCUTANEOUS ONCE
Status: COMPLETED | OUTPATIENT
Start: 2022-01-29 | End: 2022-01-30

## 2022-01-29 RX ORDER — VANCOMYCIN HYDROCHLORIDE 125 MG/1
125 CAPSULE ORAL EVERY 6 HOURS SCHEDULED
Status: DISCONTINUED | OUTPATIENT
Start: 2022-01-29 | End: 2022-01-30 | Stop reason: HOSPADM

## 2022-01-29 RX ORDER — HEPARIN SODIUM 1000 [USP'U]/ML
4000 INJECTION, SOLUTION INTRAVENOUS; SUBCUTANEOUS AS NEEDED
Status: DISCONTINUED | OUTPATIENT
Start: 2022-01-29 | End: 2022-01-30 | Stop reason: HOSPADM

## 2022-01-29 RX ADMIN — SODIUM CHLORIDE, PRESERVATIVE FREE 10 ML: 5 INJECTION INTRAVENOUS at 08:59

## 2022-01-29 RX ADMIN — FOLIC ACID 1000 MCG: 1 TABLET ORAL at 15:32

## 2022-01-29 RX ADMIN — EPOETIN ALFA-EPBX 20000 UNITS: 10000 INJECTION, SOLUTION INTRAVENOUS; SUBCUTANEOUS at 23:20

## 2022-01-29 RX ADMIN — Medication 1000 MCG: at 15:32

## 2022-01-29 RX ADMIN — INSULIN LISPRO 4 UNITS: 100 INJECTION, SOLUTION INTRAVENOUS; SUBCUTANEOUS at 12:28

## 2022-01-29 RX ADMIN — VANCOMYCIN HYDROCHLORIDE 125 MG: 125 CAPSULE ORAL at 15:30

## 2022-01-29 RX ADMIN — LEVOTHYROXINE SODIUM 50 MCG: 0.05 TABLET ORAL at 15:32

## 2022-01-29 RX ADMIN — REMDESIVIR 100 MG: 100 INJECTION, POWDER, LYOPHILIZED, FOR SOLUTION INTRAVENOUS at 15:29

## 2022-01-30 ENCOUNTER — READMISSION MANAGEMENT (OUTPATIENT)
Dept: CALL CENTER | Facility: HOSPITAL | Age: 62
End: 2022-01-30

## 2022-01-30 VITALS
RESPIRATION RATE: 18 BRPM | WEIGHT: 293 LBS | HEIGHT: 67 IN | BODY MASS INDEX: 45.99 KG/M2 | OXYGEN SATURATION: 96 % | TEMPERATURE: 98.3 F | SYSTOLIC BLOOD PRESSURE: 129 MMHG | HEART RATE: 71 BPM | DIASTOLIC BLOOD PRESSURE: 63 MMHG

## 2022-01-30 LAB
ALBUMIN SERPL-MCNC: 2.8 G/DL (ref 3.5–5.2)
ALP SERPL-CCNC: 89 U/L (ref 39–117)
ALT SERPL W P-5'-P-CCNC: 12 U/L (ref 1–33)
ANION GAP SERPL CALCULATED.3IONS-SCNC: 14 MMOL/L (ref 5–15)
AST SERPL-CCNC: 18 U/L (ref 1–32)
BH BB BLOOD EXPIRATION DATE: NORMAL
BH BB BLOOD EXPIRATION DATE: NORMAL
BH BB BLOOD TYPE BARCODE: 6200
BH BB BLOOD TYPE BARCODE: 6200
BH BB DISPENSE STATUS: NORMAL
BH BB DISPENSE STATUS: NORMAL
BH BB PRODUCT CODE: NORMAL
BH BB PRODUCT CODE: NORMAL
BH BB UNIT NUMBER: NORMAL
BH BB UNIT NUMBER: NORMAL
BILIRUB CONJ SERPL-MCNC: 0.2 MG/DL (ref 0–0.3)
BILIRUB INDIRECT SERPL-MCNC: 0.3 MG/DL
BILIRUB SERPL-MCNC: 0.5 MG/DL (ref 0–1.2)
BUN SERPL-MCNC: 23 MG/DL (ref 8–23)
BUN/CREAT SERPL: 10 (ref 7–25)
CALCIUM SPEC-SCNC: 7.8 MG/DL (ref 8.6–10.5)
CHLORIDE SERPL-SCNC: 99 MMOL/L (ref 98–107)
CO2 SERPL-SCNC: 22 MMOL/L (ref 22–29)
CREAT SERPL-MCNC: 2.3 MG/DL (ref 0.57–1)
CREAT SERPL-MCNC: 2.3 MG/DL (ref 0.57–1)
CROSSMATCH INTERPRETATION: NORMAL
CROSSMATCH INTERPRETATION: NORMAL
DEPRECATED RDW RBC AUTO: 61.6 FL (ref 37–54)
ERYTHROCYTE [DISTWIDTH] IN BLOOD BY AUTOMATED COUNT: 18.9 % (ref 12.3–15.4)
GFR SERPL CREATININE-BSD FRML MDRD: 21 ML/MIN/1.73
GFR SERPL CREATININE-BSD FRML MDRD: 21 ML/MIN/1.73
GLUCOSE BLDC GLUCOMTR-MCNC: 100 MG/DL (ref 70–130)
GLUCOSE BLDC GLUCOMTR-MCNC: 125 MG/DL (ref 70–130)
GLUCOSE BLDC GLUCOMTR-MCNC: 165 MG/DL (ref 70–130)
GLUCOSE BLDC GLUCOMTR-MCNC: 98 MG/DL (ref 70–130)
GLUCOSE SERPL-MCNC: 105 MG/DL (ref 65–99)
HCT VFR BLD AUTO: 25.5 % (ref 34–46.6)
HGB BLD-MCNC: 8.4 G/DL (ref 12–15.9)
MCH RBC QN AUTO: 29.6 PG (ref 26.6–33)
MCHC RBC AUTO-ENTMCNC: 32.9 G/DL (ref 31.5–35.7)
MCV RBC AUTO: 89.8 FL (ref 79–97)
PLATELET # BLD AUTO: 152 10*3/MM3 (ref 140–450)
PMV BLD AUTO: 9.7 FL (ref 6–12)
POTASSIUM SERPL-SCNC: 3.6 MMOL/L (ref 3.5–5.2)
PROT SERPL-MCNC: 6.8 G/DL (ref 6–8.5)
RBC # BLD AUTO: 2.84 10*6/MM3 (ref 3.77–5.28)
SODIUM SERPL-SCNC: 135 MMOL/L (ref 136–145)
UNIT  ABO: NORMAL
UNIT  ABO: NORMAL
UNIT  RH: NORMAL
UNIT  RH: NORMAL
WBC NRBC COR # BLD: 1.9 10*3/MM3 (ref 3.4–10.8)

## 2022-01-30 PROCEDURE — 85027 COMPLETE CBC AUTOMATED: CPT | Performed by: INTERNAL MEDICINE

## 2022-01-30 PROCEDURE — 94799 UNLISTED PULMONARY SVC/PX: CPT

## 2022-01-30 PROCEDURE — 82962 GLUCOSE BLOOD TEST: CPT

## 2022-01-30 PROCEDURE — 82565 ASSAY OF CREATININE: CPT | Performed by: NURSE PRACTITIONER

## 2022-01-30 PROCEDURE — 99232 SBSQ HOSP IP/OBS MODERATE 35: CPT | Performed by: INTERNAL MEDICINE

## 2022-01-30 PROCEDURE — 80048 BASIC METABOLIC PNL TOTAL CA: CPT | Performed by: INTERNAL MEDICINE

## 2022-01-30 PROCEDURE — 25010000002 HEPARIN (PORCINE) PER 1000 UNITS: Performed by: INTERNAL MEDICINE

## 2022-01-30 PROCEDURE — 25010000002 REMDESIVIR 100 MG/20ML SOLUTION 1 EACH VIAL: Performed by: NURSE PRACTITIONER

## 2022-01-30 PROCEDURE — 80076 HEPATIC FUNCTION PANEL: CPT | Performed by: NURSE PRACTITIONER

## 2022-01-30 PROCEDURE — 63710000001 INSULIN LISPRO (HUMAN) PER 5 UNITS: Performed by: NURSE PRACTITIONER

## 2022-01-30 RX ORDER — VANCOMYCIN HYDROCHLORIDE 125 MG/1
125 CAPSULE ORAL EVERY 6 HOURS SCHEDULED
Qty: 36 CAPSULE | Refills: 0 | Status: SHIPPED | OUTPATIENT
Start: 2022-01-30 | End: 2022-02-08

## 2022-01-30 RX ADMIN — REMDESIVIR 100 MG: 100 INJECTION, POWDER, LYOPHILIZED, FOR SOLUTION INTRAVENOUS at 14:48

## 2022-01-30 RX ADMIN — VANCOMYCIN HYDROCHLORIDE 125 MG: 125 CAPSULE ORAL at 14:49

## 2022-01-30 RX ADMIN — VANCOMYCIN HYDROCHLORIDE 125 MG: 125 CAPSULE ORAL at 01:40

## 2022-01-30 RX ADMIN — CARVEDILOL 3.12 MG: 3.12 TABLET, FILM COATED ORAL at 08:39

## 2022-01-30 RX ADMIN — SODIUM CHLORIDE, PRESERVATIVE FREE 10 ML: 5 INJECTION INTRAVENOUS at 01:47

## 2022-01-30 RX ADMIN — ATORVASTATIN CALCIUM 40 MG: 20 TABLET, FILM COATED ORAL at 01:40

## 2022-01-30 RX ADMIN — FOLIC ACID 1000 MCG: 1 TABLET ORAL at 08:39

## 2022-01-30 RX ADMIN — SERTRALINE 200 MG: 100 TABLET, FILM COATED ORAL at 01:39

## 2022-01-30 RX ADMIN — SODIUM CHLORIDE, PRESERVATIVE FREE 10 ML: 5 INJECTION INTRAVENOUS at 08:40

## 2022-01-30 RX ADMIN — VANCOMYCIN HYDROCHLORIDE 125 MG: 125 CAPSULE ORAL at 08:39

## 2022-01-30 RX ADMIN — LEVOTHYROXINE SODIUM 50 MCG: 0.05 TABLET ORAL at 08:39

## 2022-01-30 RX ADMIN — AMLODIPINE BESYLATE 5 MG: 5 TABLET ORAL at 08:39

## 2022-01-30 RX ADMIN — HEPARIN SODIUM 4000 UNITS: 1000 INJECTION INTRAVENOUS; SUBCUTANEOUS at 01:08

## 2022-01-30 RX ADMIN — AMLODIPINE BESYLATE 5 MG: 5 TABLET ORAL at 01:40

## 2022-01-30 RX ADMIN — CARVEDILOL 3.12 MG: 3.12 TABLET, FILM COATED ORAL at 01:39

## 2022-01-30 RX ADMIN — Medication 1000 MCG: at 08:39

## 2022-01-30 RX ADMIN — INSULIN LISPRO 2 UNITS: 100 INJECTION, SOLUTION INTRAVENOUS; SUBCUTANEOUS at 11:55

## 2022-01-30 RX ADMIN — HEPARIN SODIUM 2000 UNITS: 1000 INJECTION, SOLUTION INTRAVENOUS; SUBCUTANEOUS at 02:18

## 2022-01-30 NOTE — OUTREACH NOTE
Prep Survey      Responses   [facility] patient discharged from? [place]   Is LACE score < 7 ? No   Emergency Room discharge w/ pulse ox? No   Eligibility [facility]   Date of Admission 01/28/22   Date of Discharge 01/30/22   Discharge Disposition Home or Self Care   Discharge diagnosis Generalized weakness  COVID-19 virus detected    Does the patient have one of the following disease processes/diagnoses(primary or secondary)? COVID-19   Does the patient have Home health ordered? No   Is there a DME ordered? No   Prep survey completed? Yes          Delaney Adrian RN

## 2022-01-31 ENCOUNTER — TRANSITIONAL CARE MANAGEMENT TELEPHONE ENCOUNTER (OUTPATIENT)
Dept: CALL CENTER | Facility: HOSPITAL | Age: 62
End: 2022-01-31

## 2022-01-31 ENCOUNTER — NURSE TRIAGE (OUTPATIENT)
Dept: CALL CENTER | Facility: HOSPITAL | Age: 62
End: 2022-01-31

## 2022-01-31 DIAGNOSIS — D63.1 ANEMIA DUE TO CHRONIC KIDNEY DISEASE, ON CHRONIC DIALYSIS: ICD-10-CM

## 2022-01-31 DIAGNOSIS — Z99.2 ANEMIA DUE TO CHRONIC KIDNEY DISEASE, ON CHRONIC DIALYSIS: ICD-10-CM

## 2022-01-31 DIAGNOSIS — N18.6 ANEMIA DUE TO CHRONIC KIDNEY DISEASE, ON CHRONIC DIALYSIS: ICD-10-CM

## 2022-01-31 DIAGNOSIS — C50.919 METASTATIC BREAST CANCER: Primary | ICD-10-CM

## 2022-01-31 NOTE — OUTREACH NOTE
Call Center TCM Note      Responses   [facility] patient discharged from? [place]   Does the patient have one of the following disease processes/diagnoses(primary or secondary)? COVID-19   COVID-19 underlying condition? CHF   TCM attempt successful? Yes   Discharge diagnosis Generalized weakness  COVID-19 virus detected    Meds reviewed with patient/caregiver? Yes   Is the patient having any side effects they believe may be caused by any medication additions or changes? No   Does the patient have all medications ordered at discharge? Yes   Is the patient taking all medications as directed (includes completed medication regime)? Yes   Does the patient have a primary care provider?  Yes   Comments regarding PCP TCM FWP ith PCP Kiana Gramajo is 02/14/2022.   Does the patient have an appointment with their PCP or specialist within 7 days of discharge? Greater than 7 days   What is preventing the patient from scheduling follow up appointments within 7 days of discharge? --  [Pt needed a Monday or Friday due to multiple other appts.]   Has the patient kept scheduled appointments due by today? Yes   Has home health visited the patient within 72 hours of discharge? N/A   Psychosocial issues? No   Did the patient receive a copy of their discharge instructions? Yes   Did the patient receive a copy of COVID-19 specific instructions? Yes   Nursing interventions Reviewed instructions with patient   What is the patient's perception of their health status since discharge? Improving   Does the patient have any of the following symptoms? Shortness of breath,  Cough   Nursing Interventions Nurse provided patient education   Is the patient/caregiver able to teach back steps to recovery at home? Set small, achievable goals for return to baseline health,  Practice good oral hygiene,  Rest and rebuild strength, gradually increase activity,  Weigh daily,  Make a list of questions for provider's appointment,  Eat a well-balance  diet   If the patient is a current smoker, are they able to teach back resources for cessation? Not a smoker   Is the patient/caregiver able to teach back the hierarchy of who to call/visit for symptoms/problems? PCP, Specialist, Home health nurse, Urgent Care, ED, 911 Yes   Does the patient have scales? Yes   Is the patient weighing daily? Yes   Daily weight interventions Education provided on importance of daily weight   Is the patient able to teach back Heart Failure diet management? Yes   Is the patient able to teach back signs and symptoms of worsening condition? (i.e. weight gain, shortness of air, etc.) Yes   TCM call completed? Yes   Wrap up additional comments Pt states she is feeling better. New rx Vancomycin is ready for p/u today. No quesitons at this time. TCM FWP with PCP Kiana Gramajo has been sched for 02/14/2022. Pt needed Mon or Fri due to other appts.           Mary Villeda MA    1/31/2022, 13:07 EST

## 2022-01-31 NOTE — TELEPHONE ENCOUNTER
vancomycin (VANCOCIN) 125 MG capsule [[id number]]     Order Details  Dose: 125 mg Route: Oral Frequency: Every 6 Hours Scheduled   Dispense Quantity: 36 capsule Refills: 0    Indications of Use: Clostridioides Difficile Colitis         Sig: Take 1 capsule by mouth Every 6 (Six) Hours for 36 doses. Indications: Colon Inflammation due to Clostridium Bacteria Overgrowth         Start Date: 01/30/22 End Date: 02/08/22 after 36 doses   Written Date: 01/30/22 Expiration Date: 01/30/23     Providers    Ordering Provider and Authorizing Provider:    Jose Mcnair MD   [address] [address]   Phone:  [phone]   Fax:  [phone]   NPI:  [id number]      E-Prescribing Status      Outpatient Medication Detail    vancomycin (VANCOCIN) 125 MG capsule        Sig: Take 1 capsule by mouth Every 6 (Six) Hours for 36 doses. Indications: Colon Inflammation due to Clostridium Bacteria Overgrowth        Sent to pharmacy as: Vancomycin HCl 125 MG Oral Capsule (VANCOCIN)        Class: Normal        Route: Oral        E-Prescribing Status: Receipt confirmed by pharmacy (1/30/2022  4:19 PM CST)          I called the pharmacy and they are closed. Left voice message with this information.     Reason for Disposition  • [1] Prescription not at pharmacy AND [2] was prescribed by PCP recently (Exception: triager has access to EMR and prescription is recorded there. Go to Home Care and confirm for pharmacy.)    Additional Information  • Negative: [1] Intentional drug overdose AND [2] suicidal thoughts or ideas  • Negative: Drug overdose and triager unable to answer question  • Negative: Caller requesting information unrelated to medicine  • Negative: Caller requesting information about COVID-19 Vaccine  • Negative: Caller requesting information about Emergency Contraception  • Negative: Caller requesting information about Combined Birth Control Pills  • Negative: Caller requesting information about Progestin Birth  "Control Pills  • Negative: Caller requesting information about Post-Op pain or medicines  • Negative: Caller requesting a prescription antibiotic (such as Penicillin) for Strep throat and has a positive culture result  • Negative: Caller requesting a prescription anti-viral med (such as Tamiflu) and has influenza (flu)  symptoms  • Negative: Immunization reaction suspected  • Negative: Rash while taking a medicine or within 3 days of stopping it  • Negative: [1] Asthma and [2] having symptoms of asthma (cough, wheezing, etc.)  • Negative: [1] Symptom of illness (e.g., headache, abdominal pain, earache, vomiting) AND [2] more than mild  • Negative: Breastfeeding questions about mother's medicines and diet  • Negative: MORE THAN A DOUBLE DOSE of a prescription or over-the-counter (OTC) drug  • Negative: [1] DOUBLE DOSE (an extra dose or lesser amount) of prescription drug AND [2] any symptoms (e.g., dizziness, nausea, pain, sleepiness)  • Negative: [1] DOUBLE DOSE (an extra dose or lesser amount) of over-the-counter (OTC) drug AND [2] any symptoms (e.g., dizziness, nausea, pain, sleepiness)  • Negative: Took another person's prescription drug  • Negative: [1] DOUBLE DOSE (an extra dose or lesser amount) of prescription drug AND [2] NO symptoms (Exception: a double dose of antibiotics)  • Negative: Diabetes drug error or overdose (e.g., took wrong type of insulin or took extra dose)  • Negative: [1] Prescription refill request for ESSENTIAL medicine (i.e., likelihood of harm to patient if not taken) AND [2] triager unable to refill per department policy    Answer Assessment - Initial Assessment Questions  1. NAME of MEDICATION: \"What medicine are you calling about?\"      Vancomycin  2. QUESTION: \"What is your question?\" (e.g., medication refill, side effect)      Not received order at pharmacy  3. PRESCRIBING HCP: \"Who prescribed it?\" Reason: if prescribed by specialist, call should be referred to that group.      " "Xu  4. SYMPTOMS: \"Do you have any symptoms?\"      Cdiff  5. SEVERITY: If symptoms are present, ask \"Are they mild, moderate or severe?\"      NA  6. PREGNANCY:  \"Is there any chance that you are pregnant?\" \"When was your last menstrual period?\"      NA    Protocols used: MEDICATION QUESTION CALL-ADULT-      "

## 2022-02-01 ENCOUNTER — READMISSION MANAGEMENT (OUTPATIENT)
Dept: CALL CENTER | Facility: HOSPITAL | Age: 62
End: 2022-02-01

## 2022-02-01 NOTE — OUTREACH NOTE
COVID-19 Week 1 Survey      Responses   [facility] patient discharged from? [place]   Does the patient have one of the following disease processes/diagnoses(primary or secondary)? COVID-19   COVID-19 underlying condition? CHF   Call Number Call 2   Week 1 Call successful? Yes   Call start time 1355   Call end time 1356   Discharge diagnosis Generalized weakness  COVID-19 virus detected    Is patient permission given to speak with other caregiver? Yes   List who call center can speak with Rk spouse    Person spoke with today (if not patient) and relationship Rk spouse    Meds reviewed with patient/caregiver? Yes   Psychosocial issues? No   What is the patient's perception of their health status since discharge? Improving  [diarrhea has stopped and appetite has returned ]   Does the patient have any of the following symptoms? None   Pulse Ox monitoring None   Is the patient/caregiver able to teach back steps to recovery at home? Set small, achievable goals for return to baseline health,  Rest and rebuild strength, gradually increase activity   COVID-19 call completed? Yes          Camelia Castillo RN

## 2022-02-02 ENCOUNTER — READMISSION MANAGEMENT (OUTPATIENT)
Dept: CALL CENTER | Facility: HOSPITAL | Age: 62
End: 2022-02-02

## 2022-02-02 NOTE — OUTREACH NOTE
COVID-19 Week 1 Survey      Responses   [facility] patient discharged from? [place]   Does the patient have one of the following disease processes/diagnoses(primary or secondary)? COVID-19   COVID-19 underlying condition? CHF   Call Number Call 3   Week 1 Call successful? Yes   Call start time 1420   Call end time 1423   Discharge diagnosis Generalized weakness  COVID-19 virus detected    Meds reviewed with patient/caregiver? Yes   Is the patient having any side effects they believe may be caused by any medication additions or changes? No   Does the patient have all medications ordered at discharge? Yes   Is the patient taking all medications as directed (includes completed medication regime)? Yes   Does the patient have a primary care provider?  Yes   Comments regarding PCP PCP APPOINTMENT IS 2/14/22   Does the patient have an appointment with their PCP or specialist within 7 days of discharge? Greater than 7 days   What is preventing the patient from scheduling follow up appointments within 7 days of discharge? Earlier appointment not available   Nursing Interventions Verified appointment date/time/provider   Has the patient kept scheduled appointments due by today? N/A   Has home health visited the patient within 72 hours of discharge? N/A   Psychosocial issues? No   Did the patient receive a copy of their discharge instructions? Yes   Did the patient receive a copy of COVID-19 specific instructions? Yes   Nursing interventions Reviewed instructions with patient   What is the patient's perception of their health status since discharge? Improving   Does the patient have any of the following symptoms? None   Nursing Interventions Nurse provided patient education   Pulse Ox monitoring None   Is the patient/caregiver able to teach back steps to recovery at home? Set small, achievable goals for return to baseline health,  Rest and rebuild strength, gradually increase activity,  Practice good oral hygiene,   Eat a well-balance diet,  Make a list of questions for provider's appointment   If the patient is a current smoker, are they able to teach back resources for cessation? Not a smoker   Is the patient/caregiver able to teach back the hierarchy of who to call/visit for symptoms/problems? PCP, Specialist, Home health nurse, Urgent Care, ED, 911 Yes   Does the patient have scales? Yes   Is the patient weighing daily? Yes   Daily weight interventions Education provided on importance of daily weight   Is the patient able to teach back Heart Failure diet management? Yes   Is the patient able to teach back Heart Failure Zones? Yes   Is the patient able to teach back signs and symptoms of worsening condition? (i.e. weight gain, shortness of air, etc.) Yes   COVID-19 call completed? Yes          Evelyn Larsen LPN

## 2022-02-03 ENCOUNTER — APPOINTMENT (OUTPATIENT)
Dept: ONCOLOGY | Facility: HOSPITAL | Age: 62
End: 2022-02-03

## 2022-02-03 ENCOUNTER — APPOINTMENT (OUTPATIENT)
Dept: LAB | Facility: HOSPITAL | Age: 62
End: 2022-02-03

## 2022-02-08 ENCOUNTER — READMISSION MANAGEMENT (OUTPATIENT)
Dept: CALL CENTER | Facility: HOSPITAL | Age: 62
End: 2022-02-08

## 2022-02-08 NOTE — OUTREACH NOTE
COVID-19 Week 2 Survey      Responses   [facility] patient discharged from? [place]   Does the patient have one of the following disease processes/diagnoses(primary or secondary)? COVID-19   COVID-19 underlying condition? CHF   Call Number Call 1   COVID-19 Week 2: Call 1 attempt successful? Yes   Call start time 1034   Call end time 1035   Discharge diagnosis Generalized weakness  COVID-19 virus detected    Meds reviewed with patient/caregiver? Yes   Comments regarding PCP PCP APPOINTMENT IS 2/14/22   What is the patient's perception of their health status since discharge? Improving   Does the patient have any of the following symptoms? None   Pulse Ox monitoring Intermittent   Pulse Ox device source Patient   O2 Sat comments 95% RA   O2 Sat: education provided Sat levels   COVID-19 call completed? Yes          Camelia Castillo RN

## 2022-02-09 ENCOUNTER — OFFICE VISIT (OUTPATIENT)
Dept: ONCOLOGY | Facility: CLINIC | Age: 62
End: 2022-02-09

## 2022-02-09 ENCOUNTER — LAB (OUTPATIENT)
Dept: LAB | Facility: HOSPITAL | Age: 62
End: 2022-02-09

## 2022-02-09 VITALS
DIASTOLIC BLOOD PRESSURE: 65 MMHG | WEIGHT: 293 LBS | HEIGHT: 67 IN | OXYGEN SATURATION: 99 % | RESPIRATION RATE: 16 BRPM | SYSTOLIC BLOOD PRESSURE: 145 MMHG | TEMPERATURE: 97.7 F | HEART RATE: 65 BPM | BODY MASS INDEX: 45.99 KG/M2

## 2022-02-09 DIAGNOSIS — C50.919 METASTATIC BREAST CANCER: ICD-10-CM

## 2022-02-09 DIAGNOSIS — Z99.2 ANEMIA DUE TO CHRONIC KIDNEY DISEASE, ON CHRONIC DIALYSIS: ICD-10-CM

## 2022-02-09 DIAGNOSIS — N18.6 ANEMIA DUE TO CHRONIC KIDNEY DISEASE, ON CHRONIC DIALYSIS: ICD-10-CM

## 2022-02-09 DIAGNOSIS — D61.810 PANCYTOPENIA DUE TO CHEMOTHERAPY: ICD-10-CM

## 2022-02-09 DIAGNOSIS — N18.6 ESRD (END STAGE RENAL DISEASE): ICD-10-CM

## 2022-02-09 DIAGNOSIS — C50.919 METASTATIC BREAST CANCER: Primary | ICD-10-CM

## 2022-02-09 DIAGNOSIS — D63.8 ANEMIA, CHRONIC DISEASE: ICD-10-CM

## 2022-02-09 DIAGNOSIS — D63.1 ANEMIA DUE TO CHRONIC KIDNEY DISEASE, ON CHRONIC DIALYSIS: ICD-10-CM

## 2022-02-09 DIAGNOSIS — Z79.899 HIGH RISK MEDICATION USE: ICD-10-CM

## 2022-02-09 LAB
ALBUMIN SERPL-MCNC: 3.3 G/DL (ref 3.5–5.2)
ALBUMIN/GLOB SERPL: 0.7 G/DL (ref 1.1–2.4)
ALP SERPL-CCNC: 114 U/L (ref 38–116)
ALT SERPL W P-5'-P-CCNC: 29 U/L (ref 0–33)
ANION GAP SERPL CALCULATED.3IONS-SCNC: 12.7 MMOL/L (ref 5–15)
AST SERPL-CCNC: 32 U/L (ref 0–32)
BASOPHILS # BLD AUTO: 0.06 10*3/MM3 (ref 0–0.2)
BASOPHILS NFR BLD AUTO: 1.3 % (ref 0–1.5)
BILIRUB SERPL-MCNC: 0.5 MG/DL (ref 0.2–1.2)
BUN SERPL-MCNC: 21 MG/DL (ref 6–20)
BUN/CREAT SERPL: 6.6 (ref 7.3–30)
CALCIUM SPEC-SCNC: 8.3 MG/DL (ref 8.5–10.2)
CHLORIDE SERPL-SCNC: 100 MMOL/L (ref 98–107)
CO2 SERPL-SCNC: 23.3 MMOL/L (ref 22–29)
CREAT SERPL-MCNC: 3.17 MG/DL (ref 0.6–1.1)
DEPRECATED RDW RBC AUTO: 68.6 FL (ref 37–54)
EOSINOPHIL # BLD AUTO: 0.15 10*3/MM3 (ref 0–0.4)
EOSINOPHIL NFR BLD AUTO: 3.3 % (ref 0.3–6.2)
ERYTHROCYTE [DISTWIDTH] IN BLOOD BY AUTOMATED COUNT: 19.1 % (ref 12.3–15.4)
GFR SERPL CREATININE-BSD FRML MDRD: 15 ML/MIN/1.73
GLOBULIN UR ELPH-MCNC: 4.6 GM/DL (ref 1.8–3.5)
GLUCOSE SERPL-MCNC: 182 MG/DL (ref 74–124)
HCT VFR BLD AUTO: 29.8 % (ref 34–46.6)
HGB BLD-MCNC: 9 G/DL (ref 12–15.9)
IMM GRANULOCYTES # BLD AUTO: 0.05 10*3/MM3 (ref 0–0.05)
IMM GRANULOCYTES NFR BLD AUTO: 1.1 % (ref 0–0.5)
LYMPHOCYTES # BLD AUTO: 0.66 10*3/MM3 (ref 0.7–3.1)
LYMPHOCYTES NFR BLD AUTO: 14.7 % (ref 19.6–45.3)
MCH RBC QN AUTO: 29.7 PG (ref 26.6–33)
MCHC RBC AUTO-ENTMCNC: 30.2 G/DL (ref 31.5–35.7)
MCV RBC AUTO: 98.3 FL (ref 79–97)
MONOCYTES # BLD AUTO: 0.5 10*3/MM3 (ref 0.1–0.9)
MONOCYTES NFR BLD AUTO: 11.1 % (ref 5–12)
NEUTROPHILS NFR BLD AUTO: 3.07 10*3/MM3 (ref 1.7–7)
NEUTROPHILS NFR BLD AUTO: 68.5 % (ref 42.7–76)
NRBC BLD AUTO-RTO: 0 /100 WBC (ref 0–0.2)
PLATELET # BLD AUTO: 206 10*3/MM3 (ref 140–450)
PMV BLD AUTO: 9.1 FL (ref 6–12)
POTASSIUM SERPL-SCNC: 4 MMOL/L (ref 3.5–4.7)
PROT SERPL-MCNC: 7.9 G/DL (ref 6.3–8)
RBC # BLD AUTO: 3.03 10*6/MM3 (ref 3.77–5.28)
SODIUM SERPL-SCNC: 136 MMOL/L (ref 134–145)
WBC NRBC COR # BLD: 4.49 10*3/MM3 (ref 3.4–10.8)

## 2022-02-09 PROCEDURE — 85025 COMPLETE CBC W/AUTO DIFF WBC: CPT

## 2022-02-09 PROCEDURE — 36415 COLL VENOUS BLD VENIPUNCTURE: CPT

## 2022-02-09 PROCEDURE — 80053 COMPREHEN METABOLIC PANEL: CPT

## 2022-02-09 PROCEDURE — 99214 OFFICE O/P EST MOD 30 MIN: CPT | Performed by: NURSE PRACTITIONER

## 2022-02-09 NOTE — PROGRESS NOTES
Chief Complaint  Metastatic lobular breast cancer (ER/IA positive, HER-2/tj negative), anemia secondary to CKD3, CHF, peripheral neuropathy, chemotherapy related nausea chemotherapy-induced myelosuppression    Subjective      History of Present Illness  The patient returns today in follow-up after recent hospitalization.    She was last seen in our office 1/18/2022 receiving cycle 3-day 1 Faslodex and also thereafter beginning new week of Ibrance 1/24/2022.  On the day of her visit she was found to have worsening hyperkalemia with potassium of 6.2, BUN 65 and creatinine 4.7.  She was immediately sent to the dialysis center to resume dialysis that day.  She was placed on a twice weekly schedule receiving dialysis on Tuesdays and Saturdays since then.  Epogen was also resumed with dialysis at 20,000 units.    Patient unfortunately developed sinus congestion and cough with worsening fatigue.  She also developed nausea and diarrhea.  She was brought in for labs in our office 1/28/2022 were WBC count was 1.95, hemoglobin 6.3, platelets 132,000.  She was sent to the ED.  Chest x-ray showed infiltrates in the left base.  Hemoglobin was then reported down to 5.9, receiving 1 unit PRBCs.  Patient also found to be Covid positive.  She was started on remdesivir.  Ibrance was held during hospitalization and even at discharge 1/30/2022 due to ongoing neutropenia.  She did ultimately receive 2 units of PRBCs while hospitalized, with discharge hemoglobin up to 8.4.    She is now reviewed back in the office for close follow-up.  Hemoglobin has slightly further improved up to 9.0.  She is feeling very well today.  Denies any residual Covid symptoms.  With being off Ibrance for several weeks now her WBC count is normal at 4.4, ANC 3.07.  We discussed therefore resuming Ibrance and previous dosing fashion of 1 week on, 1 week off.  Patient otherwise will be due for monthly Faslodex in 1 week.    She denies other new concerns at this  "time.    Objective   Vital Signs:   /65   Pulse 65   Temp 97.7 °F (36.5 °C) (Temporal)   Resp 16   Ht 170.2 cm (67.01\")   Wt (!) 156 kg (343 lb 14.7 oz)   SpO2 99%   BMI 53.85 kg/m²     Physical Exam  Constitutional:       Appearance: She is well-developed. She is obese.      Comments: Patient is in a motorized scooter today   Eyes:      Conjunctiva/sclera: Conjunctivae normal.   Neck:      Thyroid: No thyromegaly.   Cardiovascular:      Rate and Rhythm: Normal rate and regular rhythm.      Heart sounds: Murmur heard.   No friction rub. No gallop.       Comments: 2/6 murmur  Pulmonary:      Effort: No respiratory distress.      Breath sounds: Normal breath sounds.      Comments: Dialysis access in place in the right subclavian position  Chest:   Breasts:      Right: No supraclavicular adenopathy.      Left: No supraclavicular adenopathy.       Abdominal:      General: Bowel sounds are normal. There is no distension.      Palpations: Abdomen is soft.      Tenderness: There is no abdominal tenderness.   Musculoskeletal:         General: Swelling present.      Comments: Trace bilateral lower extremity edema   Lymphadenopathy:      Head:      Right side of head: No submandibular adenopathy.      Cervical: No cervical adenopathy.      Upper Body:      Right upper body: No supraclavicular adenopathy.      Left upper body: No supraclavicular adenopathy.   Skin:     General: Skin is warm and dry.      Findings: No bruising or rash.   Neurological:      Mental Status: She is alert and oriented to person, place, and time.      Cranial Nerves: No cranial nerve deficit.      Motor: No abnormal muscle tone.      Deep Tendon Reflexes: Reflexes normal.   Psychiatric:         Behavior: Behavior normal.        I have reexamined the patient and the results are consistent with the previously documented exam. Ansley Flores, APRN       Result Review :   Results from last 7 days   Lab Units 02/09/22  1323   WBC " 10*3/mm3 4.49   NEUTROS ABS 10*3/mm3 3.07   HEMOGLOBIN g/dL 9.0*   HEMATOCRIT % 29.8*   PLATELETS 10*3/mm3 206                    Assessment and Plan    1. Metastatic lobular breast cancer (ER/IA positive, HER-2/tj negative):  · History of stage IIIC right breast cancer ([id number])  · Patient treated previously in the [facility] system  · Diagnosis via excisional biopsy 8/23/2003 with lobular carcinoma, 4.5 cm, positive margins.  ER/IA positive, HER-2/tj negative.  · Staging evaluation in September 2003 with negative bone scan and negative CT scans.  Ejection fraction 65%.  · Received neoadjuvant chemotherapy (? GET regimen) which apparently included Taxotere and possibly epirubicin.  Received 6 cycles.  · 1/22/2004 bilateral mastectomy with right axillary dissection.  Per available records, 10-12 cm residual invasive lobular carcinoma in the breast with 14/16 lymph nodes positive with extranodal extension.  Immediate reconstruction.  · Received adjuvant chemotherapy with carboplatin and navelbine x4 cycles  · Initiated adjuvant tamoxifen 6/22/2004  · Adjuvant radiation therapy initiated but interrupted due to chest wall cellulitis requiring removal of implants in August 2004  · Implant reconstruction again attempted with MRSA infection, implant removed 12/30/2005 with no further attempts made and reconstruction.  · Completed 5 years tamoxifen in June 2009 and subsequently transitioned to Femara.  Received Femara until June 2014.  · Patient experienced postmenopausal vaginal bleeding in 2013, negative endometrial biopsy and gynecologic evaluation.  · Patient last seen in [facility] 6/18/2014  · CT chest performed to evaluate bicuspid aortic valve and dilated asending aorta 7/12/2019.  CT showed no change in aorta however showed new free intraperitoneal fluid in the upper abdomen with mesenteric edema, concerning for carcinomatosis.  · CT abdomen and pelvis (without IV contrast) on 7/16/2019 with mesenteric  thickening, small volume of complex fluid in the mesentery which was suspicious and possibly representative of carcinomatosis, small amount of perihepatic fluid, small bowel loops tethered to omentum without obstruction, omental thickening, shotty retroperitoneal and pelvic lymph nodes (non-pathologically enlarged).  · PET scan 7/26/2019 with hypermetabolic omental activity, hypermetabolic small retroperitoneal lymph nodes, hypermetabolic activity throughout uterus with increase in size.  · CT-guided omental biopsy 7/30/2019 with metastatic lobular carcinoma of breast primary, ER positive (greater than 95%), AZ positive (90%), HER-2/tj negative (1+ IHC)  · Baseline CA 15-3 on 7/22/19 was 32.6  · Initiation of Aromasin 25 mg daily 8/12/2019.  Initiated Ibrance at 100 mg 21/28 days on 8/26/2019.  · Improvement in CA 15-3 on 9/17/19-26.3  · Following 2 cycles of Aromasin and Ibrance, CA 15-3 declined to 25.9 on 10/15/2019.  CT scan chest abdomen pelvis 10/16/2019 showed improvement in the mesenteric and omental thickening and near resolution of complex ascites.  Treatment continued.  · Stable findings on subsequent CT chest abdomen pelvis 12/11/2019.  Further improvement in CA 15-3-23.9 on 12/18/2019.  Ibrance/Aromasin continued.   · [facility] liquid biopsy 2/5/2020: MSI undetermined, mutations in BETH, NF1, CHEK2.  No evidence of PIK3CA mutation.  · Subsequent [facility] germline testing 11/12/2021 with BETH VUS (c.2864C>A) and POLE VUS (c.631A>T)  · Slight increase in CA 15-3 to 27.1 on 2/19/2020 and 29.1 on 3/18/2020.  CT however on 3/13/2020 with no new findings.  · Subsequent improvement in CA 15-3-26.5 on 5/15/2020  · CT 7/31/2020 showed evidence of stable disease.  CA 15-3 declined further to 23.1 on 7/24/2020.  · CT 10/19/2020 with stable disease.  Fluctuations in CA 15-3 of unclear significance.  · CA 15-3 on 1/6/2021 decreased to 23.  CT chest abdomen pelvis 1/29/2021 with stable findings.  · CA 15-3 on  4/1/2021 was 28.4  · CT 4/22/2021 with no evidence of progressive malignancy, notation of new focal linear subpleural opacification right upper lobe felt to be infectious and/or inflammatory.  Enlargement of 2 staghorn calculi left kidney.  Persistent left perinephric stranding.    · Hospitalization 4/29-5/4/2021 with RYAN/CKD, UTI, anemia.  Required 2 unit PRBC transfusion and initiated hemodialysis Tuesday Thursday Saturday.  Brief interruption in Ibrance during hospitalization.  · CA 15-3 on 5/27/2021 was 27.8.  · CT chest abdomen pelvis 7/2/2021 showed no evidence of progression or new metastatic disease.  There was only one remaining left renal stone which was smaller than previous exam.  Stranding surrounding both kidneys left greater than right with left hydronephrosis and hydroureter.  · CA 15-3 7/7/2021 was 25.3  · Patient experienced severe ongoing anemia felt to be in part related to Ibrance requiring transfusion support intermittently.  Ibrance dose decreased on 8/23/2020 1 to 100 mg daily for 7 days on followed by 7 days off.  · Stable CA 15-3 on 9/1/2021 at 25.5 and again 10/13/2021 at 25.6.  · CT chest abdomen pelvis 10/26/2021 with increase in left hydronephrosis with increase in left perinephric and periureteral stranding. Decrease in stone in the left kidney lower pole (7 mm).  Stable small retroperitoneal lymph and left periaortic lymph nodes.  · PET scan 11/10/2021 with multiple bilateral hypermetabolic nodular pulmonary lesions, asymmetric moderate to severe left hydronephrosis with ill-defined fat stranding and soft tissue thickening in the renal sinus and surrounding the left ureter, hypermetabolic left retroperitoneal lymph node with slight increase in size, ill-defined hypermetabolic thickening in the inferior omentum with increase in size, uptake and C7 (indeterminate, recommended MRI).  Short segments of uptake in the mid and distal esophagus possibly related to gastritis.  · PET scan  findings felt to be consistent with progressive malignancy.  Patient declined repeat omental biopsy.   · Options for further treatment discussed on 11/12/2021.  In light of renal failure and dialysis, options are more limited.  Recommendation to continue Ibrance and discontinue Aromasin, begin Faslodex.  Discussed possible future use of single agent Taxol.    · Aromasin discontinued 11/12/2021  · On 11/22/2021 initiation of Faslodex with continuation of Ibrance (100 mg daily for 7 days on followed by 7 days off).  · MRI cervical spine 11/18/2021 showed the right C7 normality to likely represent metastatic disease.  With solitary bone lesion, did not recommend pursuit of bone modifying agent.  · Following 2 cycles Faslodex/Ibrance, PET scan on 1/11/2022 showed no change in the soft tissue superior to the dome of the bladder with hypermetabolic activity (likely related to carcinomatosis).  Significant decrease in injection of fat around the left renal pelvis and stable retroperitoneal hypermetabolic lymph nodes, decrease in size and activity in small bilateral pulmonary nodules.  Findings felt to represent evidence of response to treatment.  Plan repeat PET scan after 2-3 additional cycles Faslodex/Ibrance.  · 1/18/2022 patient seen for cycle 3-day 1 Faslodex 500 mg IM.  Patient also continuing on Ibrance 100 mg daily for 7 days on, 7 days off.  Patient found to have acute abnormal chemistries with BUN up to 65, creatinine 4.5, potassium 6.2.  We spoke with Dr. Antonio, nephrology with the patient transferred to dialysis for emergent dialyzing.  Patient placed on twice weekly schedule thereafter.    · Patient hospitalized 1/27 through 1/30/2022 with active Covid infection and neutropenia.  Ibrance held.  Patient also significantly anemic with hemoglobin as low as 5.9.  She did receive 2 units of PRBCs and is now receiving Epogen with dialysis.  · 2/9/2022 patient reviewed back today in hospital follow-up.  Having been  off Ibrance now for roughly 2 weeks her WBC is normal at 4.4, ANC 3.0.  We discussed therefore starting back on Ibrance at previous dosing of 100 mg for 7 days on, 7 days off.  Hemoglobin is also improved up to 9.0.  She will be due back in 1 week for follow-up with Dr. Irvin and to continue monthly Faslodex.  2. Anemia secondary to ESRD, exacerbated by Ibrance:  · The patient appears to have a chronic anemia with hemoglobin in the 10-11 range with normal MCV.  · Patient has underlying CKD3 with baseline creatinine recently in the 1.5-1.8 range.  · Anemia evaluation 7/22/2019 with iron 30, ferritin 85.2, iron saturation 10%, TIBC 311, B12 370, erythropoietin level 20.4  · Anemia felt in large part to be related to CKD3 as well as to underlying malignancy  · Evidence of folate deficiency 8/12/2019 with level 3.84, initiated folic acid 1 mg daily  · Additional labs on 12/18/2019 with iron 73, ferritin 82.2, iron saturation 25%, TIBC 290.  We did discuss the potential use of Procrit if her hemoglobin declines into the low 9/upper 8 range.  · Decline in hemoglobin into the 8 range, iron studies 7/20/2020 with iron 54, ferritin 66.7, iron saturation 16%, TIBC 328.  On 8/7/2020 proceeded with Injectafer 750 mg IV x1 dose.  Second dose not administered due to onset of fever, subsequent iron studies precluded further administration of IV iron.  · Initiated Procrit 20,000 units every 4 weeks on 9/4/2020.  · On 1/6/2021, hemoglobin declined significantly to 7.1.  This was repeated and verified at 7.2.  No evidence of GI blood loss, stool cards negative for occult blood x3 on 1/12/2021.  Additional labs on 1/6/2021 with iron 47, ferritin 430, iron saturation 20%, TIBC 235, folate greater than 20, B12 324, haptoglobin 525, .  · Transfused 2 unit PRBC on 1/7/2021  · Change in Procrit dosing to weekly  · Due to low normal B12 level initiated oral B12 1000 mcg daily.  · Patient with symptomatic decline in hemoglobin to 7.7  on 4/1/2021, received 1 unit PRBC transfusion  · Hospitalization 4/29-5/4/2021 with RYAN/CKD, UTI, anemia.  Required 2 unit PRBC transfusion and initiated hemodialysis Tuesday Thursday Saturday.  · Transfused 2 units PRBC for hemoglobin 7.2 on 5/27/2021  · Following initiation of hemodialysis, management of BEAU transitioned to nephrology, receiving Epogen with dialysis.  · On 7/21/2021, decline in hemoglobin to 6.5 requiring additional transfusion support.  On 7/21/2021, additional anemia evaluation with iron 17, ferritin 646, iron saturation 9%, TIBC 193, folate greater than 20, B12 690.  Contacted Dr. Antonio in nephrology and confirmed that she was receiving maximum dose Epogen with dialysis.   · Patient received additional transfusion with hemoglobin down to 7.4 on 8/18/2021.  · Ongoing transfusion support prompted alteration in Ibrance dosing on 8/23/2021 as outlined above.  · After alteration in Ibrance dosing, hemoglobin improved and remained stable in the 9-10 range.  · Subsequent worsening of anemia with hemoglobin down to 7.4 on 10/13/2021 requiring transfusion 2 unit PRBC  · Additional labs 10/27/2021 with iron 14, ferritin seven or 97.4, iron saturation 7%, TIBC 213, folate greater than 20, CRP 18.06, B12 1452. Consistent with anemia secondary to chronic disease/malignancy and ESRD  · Stool negative for occult blood x3 on 11/2/2021  · Hemoglobin 7.4 on 11/22/2021, transfuse 1 unit PRBC  · Hemoglobin 7.5 on 12/3/2021, transfuse 1 unit PRBC  · Hemoglobin 7.3 on 1/4/2022, transfused 2 units PRBC  · Patient with reduced dialysis frequency to 2 days/week with concurrent decrease in Epogen dosing corresponding to increased transfusion requirement.  · 1/27-1/30/2022 hospitalized with Covid infection, hemoglobin down to 5.9.  Patient receiving 2 units PRBCs.  Now receiving Epogen with dialysis.  · 2/9/2022 hemoglobin improved up to 9.0 in the outpatient setting today.  3. ESRD on HD:  · Baseline creatinine  recently in 1.6-2.0 range  · On 9/10/2019, RYAN/CKD3 with creatinine up to 2.44, BUN of 40.  Received 1 L normal saline.  Patient with increase in oral fluid intake.  · Renal ultrasound 9/20/2019 with no evidence of obstructive uropathy.  · Diminished oral intake due to nausea from Ibrance, creatinine 2.79 with BUN 43 on 10/15/2019.  Received 1 L normal saline.  Repeat labs on 10/18/2019 with BUN 31, creatinine 2.0.  · During cycle 3 Ibrance, patient developed increase in creatinine on 11/6/2019 to 2.35 and received 1 L normal saline.  · Patient is trying to maintain adequate oral hydration.    · Gradual escalation of creatinine into the 2-2.4 range and subsequently into the 2.5-2.8 range  · Creatinine increased into the low 3 range in early January 2021.  Patient increased oral hydration with improvement back into the mid 2 range.  · Hospitalization 4/29-5/4/2021 with RYAN/CKD, UTI, anemia.  Required 2 unit PRBC transfusion and initiated hemodialysis Tuesday Thursday Saturday.   · Patient reports recent decrease in frequency of dialysis to twice per week on Tuesdays and Saturdays.  She continues to produce a significant amount of urine.    · Status post left upper extremity AV fistula placement on 11/3/2021 by vascular surgery  · 1/18/2022 patient reviewed, reporting that she had been off dialysis since 1/11/2022 with close monitoring of her lab work.  However results of this today showing BUN up to 65, creatinine 4.7, potassium 6.2.  We spoke with Dr. Ledesma, nephrology, and the patient was emergently sent for dialysis and is now continuing biweekly on Tuesday and Saturdays.  She continues Epogen weekly with dialysis.    4. CHF with mild to moderate aortic stenosis:  · Recent cardiology evaluation with echocardiogram 7/12/2019 showing ejection fraction 48% with moderate dilation of the LV cavity, global hypokinesis, grade 2 diastolic dysfunction, mild to moderate aortic stenosis, trivial pericardial effusion.  · CT  chest 7/12/2019 with no change in the aorta compared to prior study 12/13/2017.  · Echocardiogram 7/19/2021 with ejection fraction 56%, left atrial volume moderately increased, grade 2 diastolic dysfunction, severe calcification aortic valve, moderate aortic stenosis, severe mitral calcification, mild mitral stenosis, marked elevation in RVSP from tricuspid regurgitation.  · Patient notes that she discussed echo results with cardiology and valvular status was felt to be stable in regards to aortic stenosis.  5. Left upper and bilateral lower extremity peripheral neuropathy:  · Patient reports that left upper extremity neuropathy was not present from previous chemotherapy but occurred after hospitalization that required intubation and critical care stay.  Apparently felt to represent critical care neuropathy.  Improved over time.  · Bilateral lower extremity neuropathy felt to be related to diabetes  · May have future implications regarding treatment for breast cancer.  · Patient reports that peripheral neuropathy symptoms continue in the bilateral lower extremities, unchanged  6. Uterine/endometrial activity on PET scan:  · Patient experienced postmenopausal vaginal bleeding in 2013, negative endometrial biopsy and gynecologic evaluation.  · With findings on PET scan with enlarging uterus and some hypermetabolic activity, referred back to Dr. Oliveros and gynecology.    · 9/19/2019 D&C with hysteroscopy by Dr. Oliveros, no significant intraoperative findings, pathologic results with benign findings, no evidence of malignancy.  7. Nausea:  · Mild, relieved with Zofran.   · Patient experienced improvement in nausea after initiating hemodialysis  · No recent nausea  8. Myelosuppression secondary to Ibrance:  · With cycle 1, jessica WBC 2.49 with ANC 1.25 and platelet count 140,000.  · With cycle 2, jessica WBC 2.33 with ANC 1.06, maintained normal platelet count  · 1/28/2022 WBC dropping to 1.9, ANC 1.1, hemoglobin 6.1,  platelets 120,000.  Patient seen in the ED, Covid positive and hospitalized.  Ibrance held.  · 2/9/2022 patient reviewed back today with counts that have improved off Ibrance, post transfusion in the hospital, and post Covid infection, WBC up to 4.4, platelets 206, hemoglobin 9.0.  Will resume Ibrance today as outlined above.  9. Depression  · Patient with history of depression, worsened after the death of her son in November 2021  · With evidence of progressive malignancy in November 2021, patient agreeable to referral to supportive oncology  · Patient was seen in supportive oncology 11/18/2021, Zoloft increased from 50 up to 100 mg daily.  Prescribed armodafinil 50 mg daily however there or issues with insurance coverage  · Patient reports that her symptoms have improved.  10. Left perinephric stranding secondary to malignancy with hydronephrosis:  · CT 4/22/2021 showed interval enlargement of 2 staghorn calculi in the left kidney with persistent left perinephric stranding  · Hospitalization 4/29-5/4/2021 with RYAN/CKD, UTI, anemia.  Required 2 unit PRBC transfusion and initiated hemodialysis Tuesday Thursday Saturday.    · Discussion from urology regarding potential need for surgical procedure to address staghorn calculus left kidney  · CT scan 7/2/2021 with only 1 remaining left renal stone identified which had decreased in size.  Patient appears to have passed the other stone.  · As above, CT 10/26/2021 with more prominent left hydronephrosis and increase in left perinephric and periureteral stranding.  Felt to possibly be related to passage of recent stone versus partial obstruction secondary to debris or thrombus in the left ureter versus pyelonephritis.  Patient however with no clinical evidence of recent stone passage nor pyelonephritis. Malignancy felt to be the cause of CT changes around the left kidney at this point.   · Left ureteral stent replacement 12/16/2021  · PET scan 1/11/2022 with decrease in fat  injection near left renal pelvis, stent in satisfactory position.  Mild residual hydronephrosis on the left.  11. Right thyroid nodules  · Patient underwent prior thyroid biopsy by her report with benign findings many years ago, records not available  · On MRI cervical spine 11/18/2021, finding of 1.8 cm right thyroid nodule  · Thyroid ultrasound 11/26/2021 with right-sided thyroid nodules, 1 nodule was hypoechoic, solid measuring 2 cm with biopsy recommended.  · Thyroid ultrasound results reviewed with patient.  In light of her metastatic breast cancer, renal failure the significance of the thyroid nodule is felt to be much less.  We discussed possibility of thyroid biopsy however patient is inclined to forego this, especially since she has had a biopsy performed in the past with benign findings by her report.    · No hypermetabolic activity identified on PET scan 1/11/2022 in the neck    Plan:  1. Patient will resume Ibrance today and continue at previous dosing of 100 mg daily for 7 days on followed by 7 days off.  2. She will continue twice weekly dialysis under the direction of Dr. Antonio, nephrology.  3. Patient will continue weekly Epogen per protocol given with dialysis.  4. Patient will return in 1 week for follow-up with Dr. Irvin and cycle 4-day 1 Faslodex.    This patient is on high risk drug therapy requiring intensive monitoring for toxicity.

## 2022-02-14 DIAGNOSIS — E03.9 ACQUIRED HYPOTHYROIDISM: ICD-10-CM

## 2022-02-14 RX ORDER — LEVOTHYROXINE SODIUM 0.05 MG/1
TABLET ORAL
Qty: 90 TABLET | Refills: 1 | Status: SHIPPED | OUTPATIENT
Start: 2022-02-14 | End: 2022-02-15 | Stop reason: SDUPTHER

## 2022-02-15 ENCOUNTER — OFFICE VISIT (OUTPATIENT)
Dept: FAMILY MEDICINE CLINIC | Facility: CLINIC | Age: 62
End: 2022-02-15

## 2022-02-15 ENCOUNTER — READMISSION MANAGEMENT (OUTPATIENT)
Dept: CALL CENTER | Facility: HOSPITAL | Age: 62
End: 2022-02-15

## 2022-02-15 VITALS
TEMPERATURE: 97.5 F | HEIGHT: 67 IN | SYSTOLIC BLOOD PRESSURE: 104 MMHG | BODY MASS INDEX: 45.99 KG/M2 | RESPIRATION RATE: 16 BRPM | WEIGHT: 293 LBS | OXYGEN SATURATION: 99 % | DIASTOLIC BLOOD PRESSURE: 58 MMHG | HEART RATE: 61 BPM

## 2022-02-15 DIAGNOSIS — E03.9 ACQUIRED HYPOTHYROIDISM: ICD-10-CM

## 2022-02-15 DIAGNOSIS — Z79.4 CONTROLLED TYPE 2 DIABETES MELLITUS WITHOUT COMPLICATION, WITH LONG-TERM CURRENT USE OF INSULIN: ICD-10-CM

## 2022-02-15 DIAGNOSIS — R06.02 SHORTNESS OF BREATH: Primary | ICD-10-CM

## 2022-02-15 DIAGNOSIS — E78.2 MIXED HYPERLIPIDEMIA: ICD-10-CM

## 2022-02-15 DIAGNOSIS — U07.1 COVID-19 VIRUS DETECTED: ICD-10-CM

## 2022-02-15 DIAGNOSIS — D50.8 OTHER IRON DEFICIENCY ANEMIA: ICD-10-CM

## 2022-02-15 DIAGNOSIS — E11.9 CONTROLLED TYPE 2 DIABETES MELLITUS WITHOUT COMPLICATION, WITH LONG-TERM CURRENT USE OF INSULIN: ICD-10-CM

## 2022-02-15 DIAGNOSIS — Z09 HOSPITAL DISCHARGE FOLLOW-UP: ICD-10-CM

## 2022-02-15 DIAGNOSIS — C50.919 METASTATIC BREAST CANCER: ICD-10-CM

## 2022-02-15 PROCEDURE — 99495 TRANSJ CARE MGMT MOD F2F 14D: CPT | Performed by: NURSE PRACTITIONER

## 2022-02-15 RX ORDER — INSULIN DETEMIR 100 [IU]/ML
40 INJECTION, SOLUTION SUBCUTANEOUS DAILY
Qty: 13 PEN | Refills: 1 | Status: SHIPPED | OUTPATIENT
Start: 2022-02-15 | End: 2022-12-01

## 2022-02-15 RX ORDER — ATORVASTATIN CALCIUM 40 MG/1
40 TABLET, FILM COATED ORAL NIGHTLY
Qty: 90 TABLET | Refills: 1 | Status: SHIPPED | OUTPATIENT
Start: 2022-02-15 | End: 2022-11-23

## 2022-02-15 RX ORDER — LEVOTHYROXINE SODIUM 0.05 MG/1
50 TABLET ORAL DAILY
Qty: 90 TABLET | Refills: 1 | Status: SHIPPED | OUTPATIENT
Start: 2022-02-15 | End: 2023-03-21 | Stop reason: SDUPTHER

## 2022-02-15 NOTE — PROGRESS NOTES
"Chief Complaint  Metastatic lobular breast cancer (ER/NV positive, HER-2/tj negative), anemia secondary to CKD3, CHF, peripheral neuropathy, chemotherapy related nausea chemotherapy-induced myelosuppression    Subjective        History of Present Illness  The patient returns today in follow-up continuing currently on Faslodex and Ibrance.  She he is due today for cycle 4 day 1 Faslodex.  She is receiving Ibrance at a dose of 100 mg daily for 7 days on followed by 7 days off.  She did briefly hold Ibrance during hospitalization 1/28-1/30 and thereafter until she return here on 2/9/2022 due to COVID-19 infection and C. difficile colitis and associated exacerbation of leukopenia/neutropenia and anemia.  She did receive transfusion during her hospitalization.  Her counts had recovered as of 2/9/2022 and she resumed Ibrance at that time, today completing a week of treatment.  She has tolerated treatment very well.  She has fully recovered from her previous infections, noting only a very minimal residual cough after COVID-19 infection and now with normal bowel function after treatment of her C. difficile infection.  She has continued on dialysis 2 days/week on Mondays and Fridays.  Her Epogen dosing has been maximized to 20,000 units on her days of dialysis.  Overall she reports that she is doing very well at this time.  She has no new complaints.  Fatigue is significantly improved.       Objective   Vital Signs:   /70   Pulse 62   Temp 97.3 °F (36.3 °C) (Temporal)   Ht 170.2 cm (67.01\")   Wt (!) 158 kg (348 lb)   SpO2 98%   BMI 54.49 kg/m²     Physical Exam  Constitutional:       Appearance: She is well-developed. She is obese.      Comments: Patient is in a motorized scooter today   Eyes:      Conjunctiva/sclera: Conjunctivae normal.   Neck:      Thyroid: No thyromegaly.   Cardiovascular:      Rate and Rhythm: Normal rate and regular rhythm.      Heart sounds: Murmur heard.   No friction rub. No gallop.     "   Comments: 2/6 murmur  Pulmonary:      Effort: No respiratory distress.      Breath sounds: Normal breath sounds.      Comments: Dialysis access in place in the right subclavian position  Chest:   Breasts:      Right: No supraclavicular adenopathy.      Left: No supraclavicular adenopathy.       Abdominal:      General: Bowel sounds are normal. There is no distension.      Palpations: Abdomen is soft.      Tenderness: There is no abdominal tenderness.   Musculoskeletal:         General: Swelling present.      Comments: 1+ bilateral lower extremity edema with venous stasis changes noted.   Lymphadenopathy:      Head:      Right side of head: No submandibular adenopathy.      Cervical: No cervical adenopathy.      Upper Body:      Right upper body: No supraclavicular adenopathy.      Left upper body: No supraclavicular adenopathy.   Skin:     General: Skin is warm and dry.      Findings: No bruising or rash.   Neurological:      Mental Status: She is alert and oriented to person, place, and time.      Cranial Nerves: No cranial nerve deficit.      Motor: No abnormal muscle tone.      Deep Tendon Reflexes: Reflexes normal.   Psychiatric:         Behavior: Behavior normal.            Result Review : Reviewed CBC, CMP from today.       Assessment and Plan    *Metastatic lobular breast cancer (ER/MI positive, HER-2/tj negative):  · Previous stage IIIC right breast cancer in 2003, received neoadjuvant chemotherapy (unknown regimen) with subsequent bilateral mastectomies 1/22/2004 with right axillary dissection.  Residual 10-12 cm invasive lobular carcinoma on the right breast with 14/16+ nodes with extranodal extension.  Received adjuvant carboplatin and Navelbine chemotherapy x4 cycles  · Attempted adjuvant radiation to the chest wall however interrupted due to cellulitis that required removal of implants in August 2004  · Received tamoxifen from 6/22/2004 until June 2009.  Transitioned to Femara and received until June  2014  · Identification of CT findings concerning for carcinomatosis on CT scans and June 2019.  · PET scan confirmed hypermetabolic activity in the omentum as well as small retroperitoneal lymph nodes.  · CT-guided omental biopsy 7/30/2019 with metastatic lobular carcinoma of breast primary, ER positive (greater than 95%), MD positive (90%), HER-2/tj negative (1+ IHC)  · [facility] liquid biopsy 2/5/2020: MSI undetermined, mutations in BETH, NF1, CHEK2.  No evidence of PIK3CA mutation.  · Subsequent [facility] germline testing 11/12/2021 with BETH VUS (c.2864C>A) and POLE VUS (c.631A>T)  · Initiation of Aromasin and Ibrance in August 2019  · Difficulty with myelosuppression related to Ibrance requiring dose alterations, eventually changed to 100 mg for 7 days on followed by 7 days off.  · CT chest abdomen pelvis 10/26/2021 with increase in left hydronephrosis with increase in left perinephric and periureteral stranding. Decrease in stone in the left kidney lower pole (7 mm).  Stable small retroperitoneal lymph and left periaortic lymph nodes.  · PET scan 11/10/2021 with multiple bilateral hypermetabolic nodular pulmonary lesions, asymmetric moderate to severe left hydronephrosis with ill-defined fat stranding and soft tissue thickening in the renal sinus and surrounding the left ureter, hypermetabolic left retroperitoneal lymph node with slight increase in size, ill-defined hypermetabolic thickening in the inferior omentum with increase in size, uptake and C7 (indeterminate, recommended MRI).  Short segments of uptake in the mid and distal esophagus possibly related to gastritis.  · PET scan findings felt to be consistent with progressive malignancy.  Patient declined repeat omental biopsy.   · Options for further treatment discussed on 11/12/2021.  In light of renal failure and dialysis, options are more limited.  Recommendation to continue Ibrance and discontinue Aromasin, begin Faslodex.  Discussed possible  future use of single agent Taxol.    · Aromasin discontinued 11/12/2021  · On 11/22/2021 initiation of Faslodex with continuation of Ibrance (100 mg daily for 7 days on followed by 7 days off).  · MRI cervical spine 11/18/2021 showed the right C7 normality to likely represent metastatic disease.  With solitary bone lesion, did not recommend pursuit of bone modifying agent.  · Following 2 cycles Faslodex/Ibrance, PET scan on 1/11/2022 showed no change in the soft tissue superior to the dome of the bladder with hypermetabolic activity (likely related to carcinomatosis).  Significant decrease in injection of fat around the left renal pelvis and stable retroperitoneal hypermetabolic lymph nodes, decrease in size and activity in small bilateral pulmonary nodules.  Findings felt to represent evidence of response to treatment.    · Ibrance held briefly beginning 1/28/2022 due to hospitalization with COVID-19 infection and C. difficile colitis.  Patient developed leukopenia/neutropenia.  Ibrance resumed at previous dosing (100 mg daily 7 days on followed by 7 days off) on 2/9/22.  · The patient returns today in follow-up continuing on Faslodex and Ibrance.  She is due for cycle 4-day 1 Faslodex 500 mg IM today.  She continues on Ibrance 100 mg daily 7 days on followed by 7 days off which was recently resumed on 2/9/2022 after briefly holding treatment since 1/28/2022 due to hospitalization with COVID-19 infection, C. difficile colitis with associated leukopenia/neutropenia.  Her counts recovered as of 2/9/2022 when she resumed Ibrance.  Today, WBC remains normal at 4.7 with ANC 3.54.  She is completing her week of Ibrance currently.  She is tolerating this relatively well.  We discussed timeframe for follow-up PET scan.  She has fully recovered from her recent COVID-19 infection and C. difficile colitis.  Overall she is doing relatively well.  Therefore we will attempt to delay repeat PET scan until the end of cycle 5.  She  will return here in 2 weeks for CBC and RN review and I will see her in 4 weeks when she is due for cycle 5-day 1 Faslodex.  We will order PET scan at that visit.    *Anemia secondary to ESRD, exacerbated by Ibrance:  · Anemia secondary to ESRD, malignancy with exacerbation by use of Ibrance  · Previous evidence of folate deficiency 8/12/2019 with level 3.84, initiated folic acid 1 mg daily  · Previous evidence of iron deficiency, received Injectafer on 8/7/2020 750 mg IV x1 dose.  Second dose not administered due to onset of fever, subsequent iron studies precluded further administration of IV iron.  · Previous low normal B12 level initiated oral B12 1000 mcg daily.  · Patient had previously received Procrit and required intermittent transfusion support  · Following initiation of hemodialysis, management of BEAU transitioned to nephrology, receiving Epogen with dialysis.  · Ongoing transfusion support prompted alteration in Ibrance dosing on 8/23/2021.  · After alteration in Ibrance dosing, hemoglobin improved and remained stable in the 9-10 range.  · Improved but ongoing intermittent need for transfusion support despite Ibrance dose alteration  · Stool negative for occult blood x3 on 11/2/2021  · Patient with reduced dialysis frequency to 2 days/week with concurrent decrease in Epogen dosing corresponding again to increased transfusion requirement.  · Epogen dose increased per nephrology to 20,000 units twice weekly with dialysis on Mondays and Fridays  · Patient returns today with hemoglobin of 7.9.  As above she is continuing on Epogen 20,000 units every Monday and Friday with dialysis.  Consider transfusion support for hemoglobin less than 7 or symptomatic hemoglobin in the low 7 range.  Patient will notify us if she needs repeat CBC prior to return visit in 2 weeks based on symptoms.  We did discuss that if there is a trial off dialysis again in the future we will need to administer Procrit on a weekly basis  either with nephrology or through our office.    *ESRD on HD:  · Patient with previous CKD3/4  · Hospitalization 4/29-5/4/2021 with RYAN/CKD, UTI, anemia.  Required initiation of hemodialysis Tuesday Thursday Saturday.   · Decrease in frequency of dialysis to twice per week.  She continues to produce a significant amount of urine.    · Status post left upper extremity AV fistula placement on 11/3/2021 by vascular surgery  · Attempt to hold further dialysis on 1/11/2022 with close monitoring of her laboratory and clinical parameters.  Dialysis quickly resumed due to development of hyperkalemia.  · Patient continues on dialysis 2 days/week on Mondays and Fridays.  Per patient report potential future consideration to hold dialysis again in the future.  Patient is followed closely by Dr. Antonio.    *CHF with mild to moderate aortic stenosis:  · Echocardiogram 7/12/2019 showing ejection fraction 48% with moderate dilation of the LV cavity, global hypokinesis, grade 2 diastolic dysfunction, mild to moderate aortic stenosis, trivial pericardial effusion.  · Echocardiogram 7/19/2021 with ejection fraction 56%, left atrial volume moderately increased, grade 2 diastolic dysfunction, severe calcification aortic valve, moderate aortic stenosis, severe mitral calcification, mild mitral stenosis, marked elevation in RVSP from tricuspid regurgitation.  · Patient notes that she discussed echo results with cardiology and valvular status was felt to be stable in regards to aortic stenosis.    *Left upper and bilateral lower extremity peripheral neuropathy:  · Patient reports that left upper extremity neuropathy was not present from previous chemotherapy but occurred after hospitalization that required intubation and critical care stay.  Apparently felt to represent critical care neuropathy.  Improved over time.  · Bilateral lower extremity neuropathy felt to be related to diabetes  · May have future implications regarding treatment  for breast cancer.  · Patient reports that peripheral neuropathy symptoms continue in the bilateral lower extremities, unchanged    *Uterine/endometrial activity on PET scan:  · Patient experienced postmenopausal vaginal bleeding in 2013, negative endometrial biopsy and gynecologic evaluation.  · With findings on PET scan with enlarging uterus and some hypermetabolic activity, referred back to Dr. Oliveros in gynecology.    · 9/19/2019 D&C with hysteroscopy by Dr. Oliveros, no significant intraoperative findings, pathologic results with benign findings, no evidence of malignancy.    *Nausea:  · Mild, relieved with Zofran.   · Patient experienced improvement in nausea after initiating hemodialysis  · No recent nausea    *Myelosuppression secondary to Ibrance:  · WBC during hospitalization 1/28-1/30/2022 declined into the 1.9-2.0 range due to concurrent COVID-19 infection.  Ibrance was held briefly.  · Ibrance resumed on 2/9/2022 with WBC up to 4.49, ANC 3.07  · Today, WBC 4.7 with ANC 3.54.    *Depression  · Patient with history of depression, worsened after the death of her son in November 2021  · With evidence of progressive malignancy in November 2021, patient agreeable to referral to supportive oncology  · Patient was seen in supportive oncology 11/18/2021, Zoloft increased from 50 up to 100 mg daily.  Prescribed armodafinil 50 mg daily however there or issues with insurance coverage  · Patient reports that her symptoms have improved.    *Left perinephric stranding secondary to malignancy with hydronephrosis:  · CT 4/22/2021 showed interval enlargement of 2 staghorn calculi in the left kidney with persistent left perinephric stranding  · Hospitalization 4/29-5/4/2021 with RYAN/CKD, UTI, anemia.  Required 2 unit PRBC transfusion and initiated hemodialysis Tuesday Thursday Saturday.    · Discussion from urology regarding potential need for surgical procedure to address staghorn calculus left kidney  · CT scan 7/2/2021  with only 1 remaining left renal stone identified which had decreased in size.  Patient appears to have passed the other stone.  · As above, CT 10/26/2021 with more prominent left hydronephrosis and increase in left perinephric and periureteral stranding.  Felt to possibly be related to passage of recent stone versus partial obstruction secondary to debris or thrombus in the left ureter versus pyelonephritis.  Patient however with no clinical evidence of recent stone passage nor pyelonephritis. Malignancy felt to be the cause of CT changes around the left kidney at this point.   · Left ureteral stent replacement 12/16/2021  · PET scan 1/11/2022 with decrease in fat injection near left renal pelvis, stent in satisfactory position.  Mild residual hydronephrosis on the left.    *Right thyroid nodules  · Patient underwent prior thyroid biopsy by her report with benign findings many years ago, records not available  · On MRI cervical spine 11/18/2021, finding of 1.8 cm right thyroid nodule  · Thyroid ultrasound 11/26/2021 with right-sided thyroid nodules, 1 nodule was hypoechoic, solid measuring 2 cm with biopsy recommended.  · Thyroid ultrasound results reviewed with patient.  In light of her metastatic breast cancer, renal failure the significance of the thyroid nodule is felt to be much less.  We discussed possibility of thyroid biopsy however patient is inclined to forego this, especially since she has had a biopsy performed in the past with benign findings by her report.    · No hypermetabolic activity identified on PET scan 1/11/2022 in the neck    *Hospitalization 1/28-1/30/2022 due to COVID-19 infection and C. difficile colitis  · Patient fully vaccinated with 3 doses Moderna COVID-19 vaccine  · Patient developed symptoms 1/26/2022 with abrupt onset sinus congestion, mild cough, subsequently developed nausea and diarrhea on 1/27/2022.  Significant fatigue.  · Patient tested positive in emergency department on  1/28/2022 and was admitted  · Patient maintained O2 saturation in mid 90% range on room air  · Patient received remdesivir  · Patient was also found to be positive for C. difficile colitis, received course of oral vancomycin.  · Today, patient returns reporting that symptoms from her COVID-19 infection and C. difficile colitis have largely resolved.  She reports minimal residual cough and congestion, no further diarrhea.    Plan:  1. Proceed with cycle 4 -day 1 Faslodex 500 mg IM injection today  2. Continue Ibrance 100 mg daily for 7 days on followed by 7 days off (currently completing a week of Ibrance that was initiated 2/9/2022).  3. Continue oral folic acid 1 mg daily, B12 1000 mcg daily.  4. The patient is continuing on hemodialysis every Monday and Friday and is receiving Epogen 20,000 units with each dialysis per nephrology.  5. Patient is aware that if she has an upcoming break from hemodialysis she would need to continue on BEAU at least weekly either with nephrology or through our office with Procrit.  6. In 2 weeks CBC and RN review  7. MD visit in 4 weeks with CBC, CMP.  Patient will be due for cycle 5 Faslodex.  We will plan repeat PET scan at the end of cycle 5.    Patient continues on high risk medication requiring intensive monitoring.

## 2022-02-15 NOTE — OUTREACH NOTE
COVID-19 Week 3 Survey      Responses   [facility] patient discharged from? [facility]   Does the patient have one of the following disease processes/diagnoses(primary or secondary)? COVID-19   COVID-19 underlying condition? CHF   Call Number Call 1   COVID-19 Week 3: Call 1 attempt successful? Yes   Call start time [phone]   Call end time [phone]   Discharge diagnosis Generalized weakness  COVID-19 virus detected    Has the patient kept scheduled appointments due by today? Yes   What is the patient's perception of their health status since discharge? Returned to baseline/stable   Does the patient have any of the following symptoms? None   COVID-19 call completed? Yes   Revoked No further contact(revokes)-requires comment   Is the patient interested in additional calls from an ambulatory ?  NOTE:  applies to high risk patients requiring additional follow-up. No   Graduated/Revoked comments pt reports she is fine.          Kaley Lopez RN

## 2022-02-15 NOTE — PROGRESS NOTES
Subjective   Juana Hall is a 61 y.o. female.     History of Present Illness   Juana Hall 61 y.o. female presents today for Emergency Room follow up.  she was treated [facility] for shortness of breath secondary to COVID and anemia.  I reviewed all of the labs and diagnostic testing.    Current outpatient and discharge medications have been reconciled for the patient.  Reviewed by: BETY Washington    she does have a follow up appointment with a specialist:     Hospital note as follows:  Ms. Hall is a 61 y.o. never smoker with a history of congestive heart failure, CKD, diabetes mellitus, ESRD on hemodialysis Tuesday and Saturday, hypertension, hyperlipidemia, hypothyroidism, metastatic breast cancer, morbid obesity who presents to [facility] ER chief complaint of abnormal lab, shortness of breath, and admitted for generalized weakness most likely secondary to COVID-19 viral infection and anemia.     Lives with spouse.  Denies recent exposure to illness.  Reports last COVID-19 test on 12/29/2021.  Started feeling ill on Wednesday on 1/26/2022 after eating at [employer] on 1/23.  HD only on Tuesday & Saturday with last treatment on Tuesday per Dr. Tai Antonio.       Routine follow-up with oncologist today and advised ER evaluation for serum hemoglobin 6.1.        Severe anemia: Symptomatic.  Secondary to end-stage renal disease and chemotherapy. Did receive PRBC transfusion and hemoglobin has improved from 6.1-8.4.  There is no evidence of any active GI bleeding.  Hematology also did evaluate and recommended that she be discharged home and follow-up as an outpatient basis.  Leukopenia: Secondary to chemotherapy.  Oncology input appreciated.  Monitor closely.  Culture if any recurrence of fever.   COVID-19 positive: Triple vaccinated but multiple risk factors.  No infiltrate and good room air O2 sats.  Received short course of remdesivir. Not requiring any a oxygen.  Diarrhea-C. difficile  positive:   Has been initiated on oral vancomycin and will complete a course as an outpatient basis.  Patient is now afebrile and diarrhea has been improving.  Tolerating p.o. diet reasonably well.  End-stage renal disease-hemodialysis dependent: Nephrology input appreciated.  Diabetes type 2:   Resume home medications upon discharge.  Metastatic breast cancer: Oncology input appreciated.  Chronic diastolic CHF: Clinically euvolemic.  Super morbid obesity:  Complicates all aspects of care.      She reports feeling improved since hospital discharge.   She was given oral vancomycin for C. Diff and reports diarrhea has resolved.  She has not had diarrhea for a week.  She has followed up with hematology on 2/6/22 and had hgb checked which was 9.  She has next lab set tomorrow. She states shortness of breath has resolved. She has f/u with cardiology in 2 weeks and surgery with urology to replace renal stent on 3/10.  She is UTD with Dr. Antonio.      The following portions of the patient's history were reviewed and updated as appropriate: allergies, current medications, past family history, past medical history, past social history, past surgical history and problem list.    Review of Systems   Constitutional: Positive for fatigue. Negative for unexpected weight change.   Respiratory: Negative for cough and shortness of breath.    Cardiovascular: Negative for chest pain and palpitations.   Endocrine: Negative for cold intolerance, heat intolerance, polydipsia, polyphagia and polyuria.   Psychiatric/Behavioral: Negative for behavioral problems.       Objective   Physical Exam  Vitals and nursing note reviewed.   Constitutional:       Appearance: She is well-developed.   Cardiovascular:      Rate and Rhythm: Normal rate and regular rhythm.      Heart sounds: Murmur heard.    Systolic murmur is present.      Pulmonary:      Effort: Pulmonary effort is normal.      Breath sounds: Normal breath sounds.   Neurological:       Mental Status: She is alert and oriented to person, place, and time.   Psychiatric:         Mood and Affect: Mood normal.         Behavior: Behavior normal.         Thought Content: Thought content normal.         Judgment: Judgment normal.         Assessment/Plan   Diagnoses and all orders for this visit:    1. Shortness of breath (Primary)  Comments:  resolved    2. Metastatic breast cancer (HCC)    3. Other iron deficiency anemia    4. COVID-19 virus detected    5. Hospital discharge follow-up    6. Acquired hypothyroidism  -     TSH  -     T4, free  -     levothyroxine (SYNTHROID, LEVOTHROID) 50 MCG tablet; Take 1 tablet by mouth Daily.  Dispense: 90 tablet; Refill: 1    7. Mixed hyperlipidemia  -     Lipid panel  -     atorvastatin (LIPITOR) 40 MG tablet; Take 1 tablet by mouth Every Night.  Dispense: 90 tablet; Refill: 1    8. Controlled type 2 diabetes mellitus without complication, with long-term current use of insulin ([facility])  -     Levemir FlexTouch 100 UNIT/ML injection; Inject 40 Units under the skin into the appropriate area as directed Daily.  Dispense: 13 pen; Refill: 1              Current outpatient and discharge medications have been reconciled for the patient.  Reviewed by: BETY Washington  Hospital records reviewed with pt confirming HPI.

## 2022-02-16 ENCOUNTER — LAB (OUTPATIENT)
Dept: LAB | Facility: HOSPITAL | Age: 62
End: 2022-02-16

## 2022-02-16 ENCOUNTER — OFFICE VISIT (OUTPATIENT)
Dept: ONCOLOGY | Facility: CLINIC | Age: 62
End: 2022-02-16

## 2022-02-16 ENCOUNTER — APPOINTMENT (OUTPATIENT)
Dept: LAB | Facility: HOSPITAL | Age: 62
End: 2022-02-16

## 2022-02-16 ENCOUNTER — INFUSION (OUTPATIENT)
Dept: ONCOLOGY | Facility: HOSPITAL | Age: 62
End: 2022-02-16

## 2022-02-16 VITALS
SYSTOLIC BLOOD PRESSURE: 114 MMHG | OXYGEN SATURATION: 98 % | HEIGHT: 67 IN | WEIGHT: 293 LBS | HEART RATE: 62 BPM | BODY MASS INDEX: 45.99 KG/M2 | DIASTOLIC BLOOD PRESSURE: 70 MMHG | TEMPERATURE: 97.3 F

## 2022-02-16 DIAGNOSIS — D63.1 ANEMIA IN STAGE 3B CHRONIC KIDNEY DISEASE: ICD-10-CM

## 2022-02-16 DIAGNOSIS — C50.919 METASTATIC BREAST CANCER: ICD-10-CM

## 2022-02-16 DIAGNOSIS — D63.8 ANEMIA, CHRONIC DISEASE: ICD-10-CM

## 2022-02-16 DIAGNOSIS — C50.919 METASTATIC BREAST CANCER: Primary | ICD-10-CM

## 2022-02-16 DIAGNOSIS — N18.32 ANEMIA IN STAGE 3B CHRONIC KIDNEY DISEASE: ICD-10-CM

## 2022-02-16 LAB
ALBUMIN SERPL-MCNC: 3.3 G/DL (ref 3.5–5.2)
ALBUMIN/GLOB SERPL: 0.8 G/DL (ref 1.1–2.4)
ALP SERPL-CCNC: 110 U/L (ref 38–116)
ALT SERPL W P-5'-P-CCNC: 14 U/L (ref 0–33)
ANION GAP SERPL CALCULATED.3IONS-SCNC: 12.3 MMOL/L (ref 5–15)
AST SERPL-CCNC: 12 U/L (ref 0–32)
BASOPHILS # BLD AUTO: 0.07 10*3/MM3 (ref 0–0.2)
BASOPHILS NFR BLD AUTO: 1.5 % (ref 0–1.5)
BILIRUB SERPL-MCNC: 0.5 MG/DL (ref 0.2–1.2)
BUN SERPL-MCNC: 30 MG/DL (ref 6–20)
BUN/CREAT SERPL: 7.6 (ref 7.3–30)
CALCIUM SPEC-SCNC: 8.5 MG/DL (ref 8.5–10.2)
CHLORIDE SERPL-SCNC: 100 MMOL/L (ref 98–107)
CO2 SERPL-SCNC: 23.7 MMOL/L (ref 22–29)
CREAT SERPL-MCNC: 3.95 MG/DL (ref 0.6–1.1)
DEPRECATED RDW RBC AUTO: 68.5 FL (ref 37–54)
EOSINOPHIL # BLD AUTO: 0.19 10*3/MM3 (ref 0–0.4)
EOSINOPHIL NFR BLD AUTO: 4 % (ref 0.3–6.2)
ERYTHROCYTE [DISTWIDTH] IN BLOOD BY AUTOMATED COUNT: 19.6 % (ref 12.3–15.4)
GFR SERPL CREATININE-BSD FRML MDRD: 11 ML/MIN/1.73
GLOBULIN UR ELPH-MCNC: 4.4 GM/DL (ref 1.8–3.5)
GLUCOSE SERPL-MCNC: 250 MG/DL (ref 74–124)
HCT VFR BLD AUTO: 25.8 % (ref 34–46.6)
HGB BLD-MCNC: 7.9 G/DL (ref 12–15.9)
IMM GRANULOCYTES # BLD AUTO: 0.03 10*3/MM3 (ref 0–0.05)
IMM GRANULOCYTES NFR BLD AUTO: 0.6 % (ref 0–0.5)
LYMPHOCYTES # BLD AUTO: 0.73 10*3/MM3 (ref 0.7–3.1)
LYMPHOCYTES NFR BLD AUTO: 15.5 % (ref 19.6–45.3)
MCH RBC QN AUTO: 30.6 PG (ref 26.6–33)
MCHC RBC AUTO-ENTMCNC: 30.6 G/DL (ref 31.5–35.7)
MCV RBC AUTO: 100 FL (ref 79–97)
MONOCYTES # BLD AUTO: 0.14 10*3/MM3 (ref 0.1–0.9)
MONOCYTES NFR BLD AUTO: 3 % (ref 5–12)
NEUTROPHILS NFR BLD AUTO: 3.54 10*3/MM3 (ref 1.7–7)
NEUTROPHILS NFR BLD AUTO: 75.4 % (ref 42.7–76)
NRBC BLD AUTO-RTO: 0 /100 WBC (ref 0–0.2)
PLATELET # BLD AUTO: 277 10*3/MM3 (ref 140–450)
PMV BLD AUTO: 8.9 FL (ref 6–12)
POTASSIUM SERPL-SCNC: 4.6 MMOL/L (ref 3.5–4.7)
PROT SERPL-MCNC: 7.7 G/DL (ref 6.3–8)
RBC # BLD AUTO: 2.58 10*6/MM3 (ref 3.77–5.28)
SODIUM SERPL-SCNC: 136 MMOL/L (ref 134–145)
WBC NRBC COR # BLD: 4.7 10*3/MM3 (ref 3.4–10.8)

## 2022-02-16 PROCEDURE — 85025 COMPLETE CBC W/AUTO DIFF WBC: CPT

## 2022-02-16 PROCEDURE — 80053 COMPREHEN METABOLIC PANEL: CPT

## 2022-02-16 PROCEDURE — 36415 COLL VENOUS BLD VENIPUNCTURE: CPT

## 2022-02-16 PROCEDURE — 25010000002 FULVESTRANT PER 25 MG: Performed by: INTERNAL MEDICINE

## 2022-02-16 PROCEDURE — 99214 OFFICE O/P EST MOD 30 MIN: CPT | Performed by: INTERNAL MEDICINE

## 2022-02-16 PROCEDURE — 96402 CHEMO HORMON ANTINEOPL SQ/IM: CPT

## 2022-02-16 RX ADMIN — FULVESTRANT 500 MG: 250 INJECTION INTRAMUSCULAR at 14:32

## 2022-02-17 LAB
CHOLEST SERPL-MCNC: 122 MG/DL (ref 100–199)
HDLC SERPL-MCNC: 37 MG/DL
LDLC SERPL CALC-MCNC: 54 MG/DL (ref 0–99)
T4 FREE SERPL-MCNC: 1 NG/DL (ref 0.82–1.77)
TRIGL SERPL-MCNC: 185 MG/DL (ref 0–149)
TSH SERPL DL<=0.005 MIU/L-ACNC: 2.65 UIU/ML (ref 0.45–4.5)
VLDLC SERPL CALC-MCNC: 31 MG/DL (ref 5–40)

## 2022-02-24 ENCOUNTER — OFFICE VISIT (OUTPATIENT)
Dept: CARDIOLOGY | Facility: CLINIC | Age: 62
End: 2022-02-24

## 2022-02-24 VITALS
WEIGHT: 293 LBS | SYSTOLIC BLOOD PRESSURE: 120 MMHG | BODY MASS INDEX: 45.99 KG/M2 | OXYGEN SATURATION: 95 % | RESPIRATION RATE: 16 BRPM | HEART RATE: 64 BPM | HEIGHT: 67 IN | DIASTOLIC BLOOD PRESSURE: 82 MMHG

## 2022-02-24 DIAGNOSIS — I35.0 NONRHEUMATIC AORTIC VALVE STENOSIS: ICD-10-CM

## 2022-02-24 DIAGNOSIS — I77.810 ASCENDING AORTA DILATATION: ICD-10-CM

## 2022-02-24 DIAGNOSIS — Q23.1 BICUSPID AORTIC VALVE: Primary | ICD-10-CM

## 2022-02-24 PROCEDURE — 99213 OFFICE O/P EST LOW 20 MIN: CPT | Performed by: INTERNAL MEDICINE

## 2022-02-28 DIAGNOSIS — F41.9 ANXIETY AND DEPRESSION: ICD-10-CM

## 2022-02-28 DIAGNOSIS — F32.A ANXIETY AND DEPRESSION: ICD-10-CM

## 2022-02-28 RX ORDER — SERTRALINE HYDROCHLORIDE 100 MG/1
TABLET, FILM COATED ORAL
Qty: 90 TABLET | Refills: 0 | OUTPATIENT
Start: 2022-02-28

## 2022-02-28 NOTE — TELEPHONE ENCOUNTER
Supportive Oncology [facility]    Refill request received for patient Zoloft.  Last 2 visits canceled.  Given report of medication on med rec, unsure what she is currently taking.  Left message for patient regarding denied request, ability to refill with understanding of use and rescheduling of appointment in clinic.

## 2022-03-02 ENCOUNTER — LAB (OUTPATIENT)
Dept: LAB | Facility: HOSPITAL | Age: 62
End: 2022-03-02

## 2022-03-02 ENCOUNTER — CLINICAL SUPPORT (OUTPATIENT)
Dept: ONCOLOGY | Facility: HOSPITAL | Age: 62
End: 2022-03-02

## 2022-03-02 DIAGNOSIS — C50.919 METASTATIC BREAST CANCER: ICD-10-CM

## 2022-03-02 DIAGNOSIS — C50.919 METASTATIC BREAST CANCER: Primary | ICD-10-CM

## 2022-03-02 LAB
ABO GROUP BLD: NORMAL
BASOPHILS # BLD AUTO: 0.06 10*3/MM3 (ref 0–0.2)
BASOPHILS NFR BLD AUTO: 2 % (ref 0–1.5)
BLD GP AB SCN SERPL QL: NEGATIVE
DEPRECATED RDW RBC AUTO: 75.7 FL (ref 37–54)
EOSINOPHIL # BLD AUTO: 0.08 10*3/MM3 (ref 0–0.4)
EOSINOPHIL NFR BLD AUTO: 2.7 % (ref 0.3–6.2)
ERYTHROCYTE [DISTWIDTH] IN BLOOD BY AUTOMATED COUNT: 20.2 % (ref 12.3–15.4)
HCT VFR BLD AUTO: 22.5 % (ref 34–46.6)
HGB BLD-MCNC: 6.9 G/DL (ref 12–15.9)
IMM GRANULOCYTES # BLD AUTO: 0.02 10*3/MM3 (ref 0–0.05)
IMM GRANULOCYTES NFR BLD AUTO: 0.7 % (ref 0–0.5)
LYMPHOCYTES # BLD AUTO: 0.59 10*3/MM3 (ref 0.7–3.1)
LYMPHOCYTES NFR BLD AUTO: 19.9 % (ref 19.6–45.3)
MCH RBC QN AUTO: 31.7 PG (ref 26.6–33)
MCHC RBC AUTO-ENTMCNC: 30.7 G/DL (ref 31.5–35.7)
MCV RBC AUTO: 103.2 FL (ref 79–97)
MONOCYTES # BLD AUTO: 0.17 10*3/MM3 (ref 0.1–0.9)
MONOCYTES NFR BLD AUTO: 5.7 % (ref 5–12)
NEUTROPHILS NFR BLD AUTO: 2.05 10*3/MM3 (ref 1.7–7)
NEUTROPHILS NFR BLD AUTO: 69 % (ref 42.7–76)
NRBC BLD AUTO-RTO: 0 /100 WBC (ref 0–0.2)
PLATELET # BLD AUTO: 212 10*3/MM3 (ref 140–450)
PMV BLD AUTO: 8.8 FL (ref 6–12)
RBC # BLD AUTO: 2.18 10*6/MM3 (ref 3.77–5.28)
RH BLD: POSITIVE
T&S EXPIRATION DATE: NORMAL
WBC NRBC COR # BLD: 2.97 10*3/MM3 (ref 3.4–10.8)

## 2022-03-02 PROCEDURE — 86900 BLOOD TYPING SEROLOGIC ABO: CPT | Performed by: INTERNAL MEDICINE

## 2022-03-02 PROCEDURE — 86923 COMPATIBILITY TEST ELECTRIC: CPT

## 2022-03-02 PROCEDURE — G0463 HOSPITAL OUTPT CLINIC VISIT: HCPCS

## 2022-03-02 PROCEDURE — 86901 BLOOD TYPING SEROLOGIC RH(D): CPT | Performed by: INTERNAL MEDICINE

## 2022-03-02 PROCEDURE — 85025 COMPLETE CBC W/AUTO DIFF WBC: CPT

## 2022-03-02 PROCEDURE — 36415 COLL VENOUS BLD VENIPUNCTURE: CPT

## 2022-03-02 PROCEDURE — 86850 RBC ANTIBODY SCREEN: CPT | Performed by: INTERNAL MEDICINE

## 2022-03-02 RX ORDER — SODIUM CHLORIDE 9 MG/ML
250 INJECTION, SOLUTION INTRAVENOUS AS NEEDED
Status: CANCELLED | OUTPATIENT
Start: 2022-03-02

## 2022-03-02 RX ORDER — SODIUM CHLORIDE 9 MG/ML
250 INJECTION, SOLUTION INTRAVENOUS AS NEEDED
Status: DISCONTINUED | OUTPATIENT
Start: 2022-03-02 | End: 2022-03-05 | Stop reason: HOSPADM

## 2022-03-02 NOTE — PROGRESS NOTES
Pt here for RN review. Pt c/o SOA and some fatigue. Pt completed 7 days of Ibrance and started her week off today. Hgb and WBC has declined since last visit. Pt denies active bleeding and fever. The pt is getting weekly Epogen through nephrology office. D/w Dr. Irvin. V/o for pt to receive 1 unit of blood tomorrow and have pt return in one week for CBC with RN review. Sent scheduling a message. Pt v/u and given copy of labs. Directed pt to lab for armband.    Lab Results   Component Value Date    WBC 2.97 (L) 03/02/2022    HGB 6.9 (C) 03/02/2022    HCT 22.5 (L) 03/02/2022    .2 (H) 03/02/2022     03/02/2022

## 2022-03-03 ENCOUNTER — OFFICE VISIT (OUTPATIENT)
Dept: PSYCHIATRY | Facility: HOSPITAL | Age: 62
End: 2022-03-03

## 2022-03-03 ENCOUNTER — HOSPITAL ENCOUNTER (OUTPATIENT)
Dept: INFUSION THERAPY | Facility: HOSPITAL | Age: 62
Setting detail: INFUSION SERIES
Discharge: HOME | End: 2022-03-03

## 2022-03-03 VITALS
HEART RATE: 59 BPM | OXYGEN SATURATION: 100 % | TEMPERATURE: 97 F | SYSTOLIC BLOOD PRESSURE: 121 MMHG | DIASTOLIC BLOOD PRESSURE: 57 MMHG | RESPIRATION RATE: 16 BRPM

## 2022-03-03 DIAGNOSIS — F41.9 ANXIETY AND DEPRESSION: ICD-10-CM

## 2022-03-03 DIAGNOSIS — F32.A ANXIETY AND DEPRESSION: ICD-10-CM

## 2022-03-03 DIAGNOSIS — C50.919 METASTATIC BREAST CANCER: ICD-10-CM

## 2022-03-03 PROCEDURE — P9016 RBC LEUKOCYTES REDUCED: HCPCS

## 2022-03-03 PROCEDURE — 99214 OFFICE O/P EST MOD 30 MIN: CPT | Performed by: NURSE PRACTITIONER

## 2022-03-03 PROCEDURE — 36430 TRANSFUSION BLD/BLD COMPNT: CPT

## 2022-03-03 PROCEDURE — 86900 BLOOD TYPING SEROLOGIC ABO: CPT

## 2022-03-03 RX ORDER — SERTRALINE HYDROCHLORIDE 100 MG/1
150 TABLET, FILM COATED ORAL DAILY
Qty: 135 TABLET | Refills: 0 | Status: SHIPPED | OUTPATIENT
Start: 2022-03-03 | End: 2022-06-01

## 2022-03-03 RX ORDER — QUETIAPINE FUMARATE 25 MG/1
25 TABLET, FILM COATED ORAL NIGHTLY
Qty: 30 TABLET | Refills: 1 | Status: SHIPPED | OUTPATIENT
Start: 2022-03-03 | End: 2022-04-25 | Stop reason: SDUPTHER

## 2022-03-03 NOTE — PROGRESS NOTES
[facility]  In Person Session    Subjective  Patient ID: Juana Hall is a 61 y.o. female is seen face to face in the [facility].    CC: Demoralization    HPI: Pt reviews continued depression, grief, and demoralization despite some perception of improvement since increasing zoloft to 100 mg daily. Interval history reviewed with recent COVID diagnosis with inpatient admission on January 28, remdesivir, and c dif. Reviews not feeling well with increased SOA, although was surprised with COVID dx. Unsure of complete recovery due to persistent co morbidities inclusive of need for frequent blood transfusions, regular dialysis. Sleep remains disrupted, unassisted by trazodone 100 mg q hs. Reviews stimulus control at night with dedication to sleep routine and schedule. Finds it takes her an hour to fall asleep. Continues to redirect self from historical trauma of son's death, although has been more effective at this. Continues with persistent grief, anniversary reaction with his recent birthday. Pt has been reading 365 days of grief through scripture which has been helpful. Goals of care reviewed; pt reports importance of being present and helpful to family as long as possible as giving her motivation to continue current treatment, and finds this is her primary goal of care. States she will do whatever it takes to do this.    Objective   Mental Status Exam  Appearance:  clean and casually dressed, appropriate  Attitude toward clinician:  cooperative and agreeable   Speech:    Rate:  regular rate and rhythm   Volume:  normal  Motor:  no abnormal movements present  Mood:  Grieving, demoralization  Affect:  euthymic and mood congruent  Thought Processes:  linear, logical, and goal directed  Thought Content:  normal  Suicidal Thoughts:  absent  Homicidal Thoughts:  absent  Perceptual Disturbance: no perceptual disturbance  Attention and Concentration:  good  Insight and  Judgement:  good  Memory:  memory appears to be intact    Review of Systems   Constitutional: Positive for activity change and fatigue.   Psychiatric/Behavioral: Positive for dysphoric mood and sleep disturbance. The patient is nervous/anxious.      Medications Reviewed:  Zoloft 100 mg q day    Diagnoses and all orders for this visit:    1. Anxiety and depression  -     sertraline (ZOLOFT) 100 MG tablet; Take 1.5 tablets by mouth Daily.  Dispense: 135 tablet; Refill: 0    Other orders  -     QUEtiapine (SEROquel) 25 MG tablet; Take 1 tablet by mouth Every Night.  Dispense: 30 tablet; Refill: 1    Plan of Care  Patient with continued impact of grief, intrusive upon residual symptoms of depression and anxiety.  Will further increase zoloft to 150 mg daily. Add seroquel 25 mg q hs to assist with sleep.  Continued counseling provided.  Follow-up arranged in clinic in 4 weeks.    I spent 31 minutes caring for Juana on this date of service. This time includes time spent by me in the following activities: preparing for the visit, obtaining and/or reviewing a separately obtained history, performing a medically appropriate examination and/or evaluation, counseling and educating the patient/family/caregiver, ordering medications, tests, or procedures and documenting information in the medical record.

## 2022-03-03 NOTE — PATIENT INSTRUCTIONS
"https://www.[facility].org/donate-blood/blood-donation-process/what-happens-to-donated-blood/blood-transfusions/types-of-blood-transfusions.html\"> https://www.hematology.org/education/patients/blood-basics/blood-safety-and-matching\"> https://www.nhlbi.nih.gov/health-topics/blood-transfusion\">   Blood Transfusion, Adult  A blood transfusion is a procedure in which you receive blood or a type of blood cell (blood component) through an IV. You may need a blood transfusion when your blood level is low. This may result from a bleeding disorder, illness, injury, or surgery. The blood may come from a donor. You may also be able to donate blood for yourself (autologous blood donation) before a planned surgery.  The blood given in a transfusion is made up of different blood components. You may receive:  · Red blood cells. These carry oxygen to the cells in the body.  · Platelets. These help your blood to clot.  · Plasma. This is the liquid part of your blood. It carries proteins and other substances throughout the body.  · White blood cells. These help you fight infections.  If you have hemophilia or another clotting disorder, you may also receive other types of blood products.  Tell a health care provider about:  · Any blood disorders you have.  · Any previous reactions you have had during a blood transfusion.  · Any allergies you have.  · All medicines you are taking, including vitamins, herbs, eye drops, creams, and over-the-counter medicines.  · Any surgeries you have had.  · Any medical conditions you have, including any recent fever or cold symptoms.  · Whether you are pregnant or may be pregnant.  What are the risks?  Generally, this is a safe procedure. However, problems may occur.  · The most common problems include:  ? A mild allergic reaction, such as red, swollen areas of skin (hives) and itching.  ? Fever or chills. This may be the body's response to new blood cells received. This may occur during or up to 4 " hours after the transfusion.  · More serious problems may include:  ? Transfusion-associated circulatory overload (TACO), or too much fluid in the lungs. This may cause breathing problems.  ? A serious allergic reaction, such as difficulty breathing or swelling around the face and lips.  ? Transfusion-related acute lung injury (TRALI), which causes breathing difficulty and low oxygen in the blood. This can occur within hours of the transfusion or several days later.  ? Iron overload. This can happen after receiving many blood transfusions over a period of time.  ? Infection or virus being transmitted. This is rare because donated blood is carefully tested before it is given.  ? Hemolytic transfusion reaction. This is rare. It happens when your body's defense system (immune system)tries to attack the new blood cells. Symptoms may include fever, chills, nausea, low blood pressure, and low back or chest pain.  ? Transfusion-associated mdlfi-iqhykx-wmsh disease (TAGVHD). This is rare. It happens when donated cells attack your body's healthy tissues.  What happens before the procedure?  Medicines  Ask your health care provider about:  · Changing or stopping your regular medicines. This is especially important if you are taking diabetes medicines or blood thinners.  · Taking medicines such as aspirin and ibuprofen. These medicines can thin your blood. Do not take these medicines unless your health care provider tells you to take them.  · Taking over-the-counter medicines, vitamins, herbs, and supplements.  General instructions  · Follow instructions from your health care provider about eating and drinking restrictions.  · You will have a blood test to determine your blood type. This is necessary to know what kind of blood your body will accept and to match it to the donor blood.  · If you are going to have a planned surgery, you may be able to do an autologous blood donation. This may be done in case you need to have a  transfusion.  · You will have your temperature, blood pressure, and pulse monitored before the transfusion.  · If you have had an allergic reaction to a transfusion in the past, you may be given medicine to help prevent a reaction. This medicine may be given to you by mouth (orally) or through an IV.  · Set aside time for the blood transfusion. This procedure generally takes 1-4 hours to complete.  What happens during the procedure?    · An IV will be inserted into one of your veins.  · The bag of donated blood will be attached to your IV. The blood will then enter through your vein.  · Your temperature, blood pressure, and pulse will be monitored regularly during the transfusion. This monitoring is done to detect early signs of a transfusion reaction.  · Tell your nurse right away if you have any of these symptoms during the transfusion:  ? Shortness of breath or trouble breathing.  ? Chest or back pain.  ? Fever or chills.  ? Hives or itching.  · If you have any signs or symptoms of a reaction, your transfusion will be stopped and you may be given medicine.  · When the transfusion is complete, your IV will be removed.  · Pressure may be applied to the IV site for a few minutes.  · A bandage (dressing)will be applied.  The procedure may vary among health care providers and hospitals.  What happens after the procedure?  · Your temperature, blood pressure, pulse, breathing rate, and blood oxygen level will be monitored until you leave the hospital or clinic.  · Your blood may be tested to see how you are responding to the transfusion.  · You may be warmed with fluids or blankets to maintain a normal body temperature.  · If you receive your blood transfusion in an outpatient setting, you will be told whom to contact to report any reactions.  Where to find more information  For more information on blood transfusions, visit the [employer] [place]: [facility].org  Summary  · A blood transfusion is a procedure in which you  receive blood or a type of blood cell (blood component) through an IV.  · The blood you receive may come from a donor or be donated by yourself (autologous blood donation) before a planned surgery.  · The blood given in a transfusion is made up of different blood components. You may receive red blood cells, platelets, plasma, or white blood cells depending on the condition treated.  · Your temperature, blood pressure, and pulse will be monitored before, during, and after the transfusion.  · After the transfusion, your blood may be tested to see how your body has responded.  This information is not intended to replace advice given to you by your health care provider. Make sure you discuss any questions you have with your health care provider.  Document Revised: 10/22/2020 Document Reviewed: 06/11/2020  Elsevier Patient Education © 2021 [employer]

## 2022-03-06 NOTE — PROGRESS NOTES
Date of Office Visit:  2022  Encounter Provider: Pasha Harkins MD  Place of Service: [facility] CARDIOLOGY  Patient Name: Juana Hall  :1960    Chief complaint: Follow-up for bicuspid aortic valve, aortic stenosis, calcific   mitral stenosis, and dilated ascending aorta.    History of Present Illness:    I again had the pleasure of seeing the patient in cardiology office on 2022.  She is   a very pleasant 61 year-old white female with a past medical history significant for a   bicuspid aortic valve, aortic stenosis, prior breast cancer, diabetes, and chronic kidney   disease who presents for follow-up.  I initially saw her on 2017.  The patient   underwent an echocardiogram on 10/23/2017 for a systolic murmur.  This showed a   likely bicuspid aortic valve which was heavily calcified.  She had mild aortic stenosis   with a peak gradient of 19 mmHg, and a mean gradient of 11 mmHg.  Her father also   had a ruptured thoracic aortic aneurysm.  Both of her parents had coronary artery   disease, with her mother having a fatal MI at age 72, and her father having a fatal MI at   age 74.  She did undergo a CT scan of the chest without contrast on 2017   (contrast was not used secondary to her advanced kidney disease).  This did not show   any evidence of aneurysm, although her ascending aorta was mildly dilated at 3.8 cm.    The patient was diagnosed with recurrent metastatic breast carcinoma and   carcinomatosis in .  She is followed by Dr. Irvin in the [facility].  She was   treated with chemotherapy, although Adriamycin and Herceptin were not used in her   regimen.  She later developed end-stage renal disease and went on hemodialysis in   2021.  She did have progression of her aortic stenosis with an echocardiogram   on 2021 showing a peak gradient of 50 mmHg, a mean gradient of 32 mmHg,   and a calculated aortic valve area of 1.1 cm².  She  also was noted to have severe   mitral annular calcification with mild mitral stenosis at that time as well.     The patient presents today for follow-up.  She is still being treated for the breast   cancer with Ibrance and Faslodex.  She has not had any significant chest pain or   shortness of breath.  She denied any new exertional symptoms since her last visit.    She is still getting dialysis.      Past Medical History:   Diagnosis Date   • Anemia    • Anxiety and depression    • Bicuspid aortic valve    • Breast cancer (HCC)     RIGHT, HAD NEELA. MASTECTOMY, CHEMO AND RADIATION   • CHF (congestive heart failure) (HCC)    • CKD (chronic kidney disease)    • Diabetes mellitus (HCC)    • Dialysis patient (HCC)     T[facility] SATURDAY [facility]   • Elevated cholesterol    • Frequent UTI    • Heart murmur    • History of kidney stones    • History of MRSA infection     POST BREAST SURGERY-TREATED [facility]   • History of sepsis     KIDNEY STONE   • History of transfusion    • Hyperlipidemia    • Hypertension    • Hyperthyroidism    • Hypothyroidism    • Kidney stone     CURRENT LEFT-DEC 2021   • Lymphedema of leg     LEFT   • Metastasis from breast cancer (HCC)     STOMACH-OMENTUM   • Neuromuscular disorder (HCC)    • Obesity    • ANDREW on CPAP     CPAP   • Osteoarthrosis    • Peripheral neuropathy     FEET BILAT   • Radiculopathy    • Thickened endometrium    • Weakness     BILAT LEGS-WITH ANY AMT OF TIME       Past Surgical History:   Procedure Laterality Date   • ARTERIOVENOUS FISTULA/SHUNT SURGERY Left 11/3/2021    Procedure: LEFT  BRACHIOCEPALIC ARTERIOVENOUS FISTULA;  Surgeon: Dejuan Adler MD;  Location[facility];  Service: Vascular;  Laterality: Left;   • BREAST RECONSTRUCTION      WITH IMPLANTS, AND REVISION, NOW REMOVED   • BREAST SURGERY     • CATARACT EXTRACTION WITH INTRAOCULAR LENS IMPLANT Bilateral    •  SECTION  1987   •  SECTION  1987   •  CYSTOSCOPY W/ URETERAL STENT PLACEMENT  2010   • CYSTOSCOPY W/ URETERAL STENT PLACEMENT Left 12/16/2021    Procedure: CYSTOSCOPY KEFT RETROGRADE PYELOGRAM  LEFT  URETERAL STENT PLACEMENT;  Surgeon: Leodan Mckee Jr., MD;  Location: [facility] MAIN OR;  Service: Urology;  Laterality: Left;   • D & C HYSTEROSCOPY N/A 5/22/2017    Procedure: DILATATION AND CURETTAGE, HYSTEROSCOPY ;  Surgeon: Cherie Oliveros MD;  Location:  [facility];  Service:    • D & C HYSTEROSCOPY N/A 9/19/2019    Procedure: DILATATION AND CURETTAGE HYSTEROSCOPY/POLYPECTOMY;  Surgeon: Cherie Oliveros MD;  Location:  [facility];  Service: Gynecology   • INSERTION HEMODIALYSIS CATHETER Right 5/1/2021    Procedure: HEMODIALYSIS CATHETER INSERTION;  Surgeon: Clint Hernández MD;  Location: [facility] MAIN OR;  Service: Vascular;  Laterality: Right;   • LYMPH NODE BIOPSY     • MASTECTOMY Bilateral 2004       Current Outpatient Medications on File Prior to Visit   Medication Sig Dispense Refill   • acetaminophen (TYLENOL) 500 MG tablet Take 500 mg by mouth As Needed.     • amLODIPine (NORVASC) 5 MG tablet Take 1 tablet by mouth Daily. 90 tablet 3   • aspirin 81 MG EC tablet Take 81 mg by mouth Daily. HELD FOR SURGERY     • atorvastatin (LIPITOR) 40 MG tablet Take 1 tablet by mouth Every Night. 90 tablet 1   • carvedilol (COREG) 3.125 MG tablet Take 1 tablet by mouth 2 (Two) Times a Day. 180 tablet 3   • folic acid (FOLVITE) 1 MG tablet TAKE ONE TABLET BY MOUTH DAILY 30 tablet 2   • Levemir FlexTouch 100 UNIT/ML injection Inject 40 Units under the skin into the appropriate area as directed Daily. 13 pen 1   • levothyroxine (SYNTHROID, LEVOTHROID) 50 MCG tablet Take 1 tablet by mouth Daily. 90 tablet 1   • ondansetron (Zofran) 8 MG tablet Take 1 tablet by mouth Every 8 (Eight) Hours As Needed for Nausea or Vomiting. 30 tablet 2   • Palbociclib 100 MG tablet tablet Take 1 tablet by mouth Daily. Take for 7 days on, then 7 days off. 14 tablet 6   • vitamin  "B-12 (CYANOCOBALAMIN) 1000 MCG tablet Take 1,000 mcg by mouth Daily.     • vitamin D (ERGOCALCIFEROL) 56173 units capsule capsule Take 50,000 Units by mouth Every 7 (Seven) Days. WEDNESDAY     • amoxicillin (AMOXIL) 500 MG capsule TAKE 4 CAPSULES BY MOUTH 1 HOUR PRIOR TO DENTAL APPOINTMENT 4 capsule 1     No current facility-administered medications on file prior to visit.     Allergies as of 2022   • (No Known Allergies)     Social History     Socioeconomic History   • Marital status:    Tobacco Use   • Smoking status: Never Smoker   • Smokeless tobacco: Never Used   Vaping Use   • Vaping Use: Never used   Substance and Sexual Activity   • Alcohol use: No   • Drug use: No   • Sexual activity: Yes     Partners: Male     Birth control/protection: Post-menopausal     Family History   Problem Relation Age of Onset   • Coronary artery disease Mother         Mother  from MI at 72   • Diabetes Mother    • Breast cancer Mother    • Hyperlipidemia Mother    • Arthritis Mother    • Cancer Mother    • Aortic aneurysm Father         Father with ruptured thoracic aortic aneurysm   • Coronary artery disease Father         Father  from MI at 74   • Heart disease Father    • Hyperlipidemia Father    • Arthritis Father    • Coronary artery disease Maternal Grandmother         MGM deceeased from MI at age 76   • Malig Hyperthermia Neg Hx        Review of Systems   Constitutional: Positive for malaise/fatigue.   Cardiovascular: Positive for leg swelling.   All other systems reviewed and are negative.     Objective:     Vitals:    22 1421   BP: 120/82   BP Location: Left arm   Patient Position: Sitting   Cuff Size: Large Adult   Pulse: 64   Resp: 16   SpO2: 95%   Weight: (!) 158 kg (348 lb)   Height: 170.2 cm (67.01\")     Body mass index is 54.49 kg/m².    Physical Exam  Constitutional:       Appearance: She is well-developed.      Comments: Obese   HENT:      Head: Normocephalic and atraumatic. "   Eyes:      Conjunctiva/sclera: Conjunctivae normal.   Cardiovascular:      Rate and Rhythm: Normal rate and regular rhythm.      Heart sounds: Murmur heard.    Systolic murmur is present with a grade of 3/6 at the upper right sternal border and upper left sternal border.    No friction rub. No gallop.   Pulmonary:      Effort: Pulmonary effort is normal.      Breath sounds: Normal breath sounds.   Abdominal:      Palpations: Abdomen is soft.      Tenderness: There is no abdominal tenderness.   Musculoskeletal:      Cervical back: Neck supple.      Comments: Trace edema of the lower extremities bilaterally    Skin:     General: Skin is warm.   Neurological:      Mental Status: She is alert and oriented to person, place, and time.   Psychiatric:         Behavior: Behavior normal.       Lab Review:   Procedures  Cardiac Procedures:  1. Echocardiogram on 10/23/2017: The ejection fraction was low normal at 50-55%. There   was mild LVH.  There was grade 1 diastolic dysfunction.  The left atrium was mildly dilated.    A bicuspid aortic valve could not be excluded.  The aortic valve leaflets were heavily   calcified.  There was mild aortic stenosis with a peak gradient of 19 mmHg, and a mean   gradient of 11 mmHg.  2. CT scan of the chest without contrast on 12/13/2017: The ascending aorta was mildly   dilated at 3.8 cm.  There was no aortic aneurysm.  3.  Echocardiogram on 7/12/2019: The left ventricle was mildly to moderately enlarged.    There was borderline global hypokinesis.  The calculated ejection fraction was 48%.    There was grade 2 diastolic dysfunction.  The right ventricle was normal.  The aortic   valve was bicuspid and heavily calcified.  There was mild to moderate aortic stenosis   with a peak pressure of 28 mmHg and a mean pressure of 17 mmHg.  There were   several calcified chordae.  There was mild mitral regurgitation.  4.  Echocardiogram on 7/19/2021: The ejection fraction was 56%.  There was grade 2    diastolic dysfunction.  The left atrium was moderately enlarged.  The aortic valve was   bicuspid with moderate aortic stenosis.  The peak gradient was 50 mmHg, and the   mean 32 mmHg.  There was severe mitral annular calcification with mild mitral stenosis.    There was mild tricuspid regurgitation.  The calculated RVSP was 58 mmHg.       Assessment:       Diagnosis Plan   1. Bicuspid aortic valve  Adult Transthoracic Echo Complete w/ Color, Spectral and Contrast if Necessary Per Protocol   2. Nonrheumatic aortic valve stenosis  Adult Transthoracic Echo Complete w/ Color, Spectral and Contrast if Necessary Per Protocol   3. Ascending aorta dilatation (HCC)       Plan:       She is completely asymptomatic from a cardiac standpoint.  Again, she has multiple noncardiac issues, including metastatic breast cancer with carcinomatosis, as well as end-stage renal disease on hemodialysis.  Her echocardiogram from July 2021 did show significant progression of the aortic stenosis with a peak gradient of 50 and a mean gradient of 32.  This is not uncommon in bicuspid aortic valves, and this needs to be monitored more closely.  She will need an echocardiogram once a year at a minimum.  Given her multiple other medical issues, if the mitral stenosis was not a major problem, and she did not have severe coronary artery disease, a TAVR would be a potential option at this point if needed in the future.  She will continue to see Dr. Irvin, and she is on Ibrance and Faslodex currently.  She will continue on the amlodipine and carvedilol.  Her blood pressure has been reasonably well controlled.    I will have her follow-up with BETY Tavera, in 4 months.  I have ordered a repeat echocardiogram for that same day.

## 2022-03-08 ENCOUNTER — PRE-ADMISSION TESTING (OUTPATIENT)
Dept: PREADMISSION TESTING | Facility: HOSPITAL | Age: 62
End: 2022-03-08

## 2022-03-08 ENCOUNTER — LAB (OUTPATIENT)
Dept: LAB | Facility: HOSPITAL | Age: 62
End: 2022-03-08

## 2022-03-08 ENCOUNTER — INFUSION (OUTPATIENT)
Dept: ONCOLOGY | Facility: HOSPITAL | Age: 62
End: 2022-03-08

## 2022-03-08 VITALS
HEART RATE: 67 BPM | TEMPERATURE: 98.4 F | WEIGHT: 293 LBS | DIASTOLIC BLOOD PRESSURE: 63 MMHG | RESPIRATION RATE: 18 BRPM | HEIGHT: 67 IN | BODY MASS INDEX: 45.99 KG/M2 | SYSTOLIC BLOOD PRESSURE: 132 MMHG | OXYGEN SATURATION: 94 %

## 2022-03-08 DIAGNOSIS — Z01.818 OTHER SPECIFIED PRE-OPERATIVE EXAMINATION: ICD-10-CM

## 2022-03-08 DIAGNOSIS — C50.919 METASTATIC BREAST CANCER: Primary | ICD-10-CM

## 2022-03-08 LAB
BASOPHILS # BLD AUTO: 0.07 10*3/MM3 (ref 0–0.2)
BASOPHILS NFR BLD AUTO: 1.9 % (ref 0–1.5)
DEPRECATED RDW RBC AUTO: 75.5 FL (ref 37–54)
EOSINOPHIL # BLD AUTO: 0.1 10*3/MM3 (ref 0–0.4)
EOSINOPHIL NFR BLD AUTO: 2.8 % (ref 0.3–6.2)
ERYTHROCYTE [DISTWIDTH] IN BLOOD BY AUTOMATED COUNT: 20.8 % (ref 12.3–15.4)
HCT VFR BLD AUTO: 25.4 % (ref 34–46.6)
HGB BLD-MCNC: 8.1 G/DL (ref 12–15.9)
IMM GRANULOCYTES # BLD AUTO: 0.02 10*3/MM3 (ref 0–0.05)
IMM GRANULOCYTES NFR BLD AUTO: 0.6 % (ref 0–0.5)
LYMPHOCYTES # BLD AUTO: 0.79 10*3/MM3 (ref 0.7–3.1)
LYMPHOCYTES NFR BLD AUTO: 21.9 % (ref 19.6–45.3)
MCH RBC QN AUTO: 32.3 PG (ref 26.6–33)
MCHC RBC AUTO-ENTMCNC: 31.9 G/DL (ref 31.5–35.7)
MCV RBC AUTO: 101.2 FL (ref 79–97)
MONOCYTES # BLD AUTO: 0.45 10*3/MM3 (ref 0.1–0.9)
MONOCYTES NFR BLD AUTO: 12.5 % (ref 5–12)
NEUTROPHILS NFR BLD AUTO: 2.18 10*3/MM3 (ref 1.7–7)
NEUTROPHILS NFR BLD AUTO: 60.3 % (ref 42.7–76)
NRBC BLD AUTO-RTO: 0 /100 WBC (ref 0–0.2)
PLATELET # BLD AUTO: 173 10*3/MM3 (ref 140–450)
PMV BLD AUTO: 9.6 FL (ref 6–12)
RBC # BLD AUTO: 2.51 10*6/MM3 (ref 3.77–5.28)
SARS-COV-2 ORF1AB RESP QL NAA+PROBE: NOT DETECTED
WBC NRBC COR # BLD: 3.61 10*3/MM3 (ref 3.4–10.8)

## 2022-03-08 PROCEDURE — G0463 HOSPITAL OUTPT CLINIC VISIT: HCPCS

## 2022-03-08 PROCEDURE — C9803 HOPD COVID-19 SPEC COLLECT: HCPCS

## 2022-03-08 PROCEDURE — U0004 COV-19 TEST NON-CDC HGH THRU: HCPCS

## 2022-03-08 PROCEDURE — 85025 COMPLETE CBC W/AUTO DIFF WBC: CPT

## 2022-03-08 PROCEDURE — 36415 COLL VENOUS BLD VENIPUNCTURE: CPT

## 2022-03-08 NOTE — NURSING NOTE
Pt here for CBC and RN review. Pt having cystoscopy with stent placement on 3/10/2022; Pt Hgb 6.9 on 3/2/2022. Pt received 1 unit of blood on 3/4/2022. Hgb increased to 8.1 today. RN reviewed labs with pt and gave printed copy of labs to pt. No intervention needed at this time. Pt v/u. Discharged home.

## 2022-03-08 NOTE — DISCHARGE INSTRUCTIONS
Take the following medications the morning of surgery with a small sip of water:    LEVOTHYROXINE  CARVEDILOL  AMLODIPINE    ARRIVE AT 0800.      If you are on prescription narcotic pain medication to control your pain you may also take that medication the morning of surgery.    General Instructions:  Do not eat or drink anything after midnight the night before surgery.  Infants may have breast milk up to four hours before surgery.  Infants drinking formula may drink formula up to six hours before surgery.   Patients who avoid smoking, chewing tobacco and alcohol for 4 weeks prior to surgery have a reduced risk of post-operative complications.  Quit smoking as many days before surgery as you can.  Do not smoke, use chewing tobacco or drink alcohol the day of surgery.   If applicable bring your C-PAP/ BI-PAP machine.  Bring any papers given to you in the doctor’s office.  Wear clean comfortable clothes.  Do not wear contact lenses, false eyelashes or make-up.  Bring a case for your glasses.   Bring crutches or walker if applicable.  Remove all piercings.  Leave jewelry and any other valuables at home.  Hair extensions with metal clips must be removed prior to surgery.  The Pre-Admission Testing nurse will instruct you to bring medications if unable to obtain an accurate list in Pre-Admission Testing.        If you were given a blood bank ID arm band remember to bring it with you the day of surgery.    Preventing a Surgical Site Infection:  For 2 to 3 days before surgery, avoid shaving with a razor because the razor can irritate skin and make it easier to develop an infection.    Any areas of open skin can increase the risk of a post-operative wound infection by allowing bacteria to enter and travel throughout the body.  Notify your surgeon if you have any skin wounds / rashes even if it is not near the expected surgical site.  The area will need assessed to determine if surgery should be delayed until it is  healed.  The night prior to surgery shower using a fresh bar of anti-bacterial soap (such as Dial) and clean washcloth.  Sleep in a clean bed with clean clothing.  Do not allow pets to sleep with you.  Shower on the morning of surgery using a fresh bar of anti-bacterial soap (such as Dial) and clean washcloth.  Dry with a clean towel and dress in clean clothing.  Ask your surgeon if you will be receiving antibiotics prior to surgery.  Make sure you, your family, and all healthcare providers clean their hands with soap and water or an alcohol based hand  before caring for you or your wound.    Day of surgery:  Your arrival time is approximately two hours before your scheduled surgery time.  Upon arrival, a Pre-op nurse and Anesthesiologist will review your health history, obtain vital signs, and answer questions you may have.  The only belongings needed at this time will be your home medications and if applicable your C-PAP/BI-PAP machine.  A Pre-op nurse will start an IV and you may receive medication in preparation for surgery, including something to help you relax.      Please be aware that surgery does come with discomfort.  We want to make every effort to control your discomfort so please discuss any uncontrolled symptoms with your nurse.   Your doctor will most likely have prescribed pain medications.      If you are going home after surgery you will receive individualized written care instructions before being discharged.  A responsible adult must drive you to and from the hospital on the day of your surgery and stay with you for 24 hours.  Discharge prescriptions can be filled by the hospital pharmacy during regular pharmacy hours.  If you are having surgery late in the day/evening your prescription may be e-prescribed to your pharmacy.  Please verify your pharmacy hours or chose a 24 hour pharmacy to avoid not having access to your prescription because your pharmacy has closed for the day.    If you  are staying overnight following surgery, you will be transported to your hospital room following the recovery period.  [facility] has all private rooms.    If you have any questions please call Pre-Admission Testing at [phone].  Deductibles and co-payments are collected on the day of service. Please be prepared to pay the required co-pay, deductible or deposit on the day of service as defined by your plan.    Patient Education for Self-Quarantine Process    Following your COVID testing, we strongly recommend that you wear a mask when you are with other people and practice social distancing.   Limit your activities to only required outings.  Wash your hands with soap and water frequently for at least 20 seconds.   Avoid touching your eyes, nose and mouth with unwashed hands.  Do not share anything - utensils, drinking glasses, food from the same bowl.   Sanitize household surfaces daily. Include all high touch areas (door handles, light switches, phones, countertops, etc.)    Call your surgeon immediately if you experience any of the following symptoms:  Sore Throat  Shortness of Breath or difficulty breathing  Cough  Chills  Body soreness or muscle pain  Headache  Fever  New loss of taste or smell  Do not arrive for your surgery ill.  Your procedure will need to be rescheduled to another time.  You will need to call your physician before the day of surgery to avoid any unnecessary exposure to hospital staff as well as other patients.

## 2022-03-09 ENCOUNTER — SPECIALTY PHARMACY (OUTPATIENT)
Dept: PHARMACY | Facility: HOSPITAL | Age: 62
End: 2022-03-09

## 2022-03-10 ENCOUNTER — APPOINTMENT (OUTPATIENT)
Dept: GENERAL RADIOLOGY | Facility: HOSPITAL | Age: 62
End: 2022-03-10

## 2022-03-10 ENCOUNTER — ANESTHESIA EVENT (OUTPATIENT)
Dept: PERIOP | Facility: HOSPITAL | Age: 62
End: 2022-03-10

## 2022-03-10 ENCOUNTER — HOSPITAL ENCOUNTER (OUTPATIENT)
Facility: HOSPITAL | Age: 62
Setting detail: HOSPITAL OUTPATIENT SURGERY
Discharge: HOME | End: 2022-03-10
Attending: UROLOGY | Admitting: UROLOGY

## 2022-03-10 ENCOUNTER — ANESTHESIA (OUTPATIENT)
Dept: PERIOP | Facility: HOSPITAL | Age: 62
End: 2022-03-10

## 2022-03-10 VITALS
RESPIRATION RATE: 16 BRPM | WEIGHT: 293 LBS | TEMPERATURE: 98.1 F | HEIGHT: 67 IN | HEART RATE: 66 BPM | OXYGEN SATURATION: 96 % | SYSTOLIC BLOOD PRESSURE: 143 MMHG | BODY MASS INDEX: 45.99 KG/M2 | DIASTOLIC BLOOD PRESSURE: 67 MMHG

## 2022-03-10 DIAGNOSIS — N13.30 HYDRONEPHROSIS, UNSPECIFIED HYDRONEPHROSIS TYPE: Primary | ICD-10-CM

## 2022-03-10 LAB
ANION GAP SERPL CALCULATED.3IONS-SCNC: 15.2 MMOL/L (ref 5–15)
BUN SERPL-MCNC: 51 MG/DL (ref 8–23)
BUN/CREAT SERPL: 12 (ref 7–25)
CALCIUM SPEC-SCNC: 8.7 MG/DL (ref 8.6–10.5)
CHLORIDE SERPL-SCNC: 105 MMOL/L (ref 98–107)
CO2 SERPL-SCNC: 20.8 MMOL/L (ref 22–29)
CREAT SERPL-MCNC: 4.25 MG/DL (ref 0.57–1)
EGFRCR SERPLBLD CKD-EPI 2021: 11.2 ML/MIN/1.73
GLUCOSE BLDC GLUCOMTR-MCNC: 147 MG/DL (ref 70–130)
GLUCOSE SERPL-MCNC: 156 MG/DL (ref 65–99)
POTASSIUM SERPL-SCNC: 4.7 MMOL/L (ref 3.5–5.2)
SODIUM SERPL-SCNC: 141 MMOL/L (ref 136–145)

## 2022-03-10 PROCEDURE — 25010000002 CEFAZOLIN 1-4 GM/50ML-% SOLUTION: Performed by: NURSE ANESTHETIST, CERTIFIED REGISTERED

## 2022-03-10 PROCEDURE — C1769 GUIDE WIRE: HCPCS | Performed by: UROLOGY

## 2022-03-10 PROCEDURE — 25010000002 PROPOFOL 10 MG/ML EMULSION: Performed by: NURSE ANESTHETIST, CERTIFIED REGISTERED

## 2022-03-10 PROCEDURE — 80048 BASIC METABOLIC PNL TOTAL CA: CPT | Performed by: UROLOGY

## 2022-03-10 PROCEDURE — 82962 GLUCOSE BLOOD TEST: CPT

## 2022-03-10 PROCEDURE — 74420 UROGRAPHY RTRGR +-KUB: CPT

## 2022-03-10 PROCEDURE — 25010000002 DEXAMETHASONE PER 1 MG: Performed by: NURSE ANESTHETIST, CERTIFIED REGISTERED

## 2022-03-10 PROCEDURE — C1758 CATHETER, URETERAL: HCPCS | Performed by: UROLOGY

## 2022-03-10 PROCEDURE — 25010000002 SUCCINYLCHOLINE PER 20 MG: Performed by: NURSE ANESTHETIST, CERTIFIED REGISTERED

## 2022-03-10 PROCEDURE — 25010000002 CEFAZOLIN IN DEXTROSE 2-4 GM/100ML-% SOLUTION: Performed by: UROLOGY

## 2022-03-10 PROCEDURE — 0 IOTHALAMATE 60 % SOLUTION: Performed by: UROLOGY

## 2022-03-10 PROCEDURE — 25010000002 MIDAZOLAM PER 1 MG: Performed by: ANESTHESIOLOGY

## 2022-03-10 PROCEDURE — C2617 STENT, NON-COR, TEM W/O DEL: HCPCS | Performed by: UROLOGY

## 2022-03-10 PROCEDURE — 25010000002 FENTANYL CITRATE (PF) 50 MCG/ML SOLUTION: Performed by: NURSE ANESTHETIST, CERTIFIED REGISTERED

## 2022-03-10 PROCEDURE — 25010000002 ONDANSETRON PER 1 MG: Performed by: NURSE ANESTHETIST, CERTIFIED REGISTERED

## 2022-03-10 DEVICE — IMPLANTABLE DEVICE: Type: IMPLANTABLE DEVICE | Site: URETER | Status: FUNCTIONAL

## 2022-03-10 RX ORDER — SULFAMETHOXAZOLE AND TRIMETHOPRIM 800; 160 MG/1; MG/1
0.5 TABLET ORAL 2 TIMES DAILY
Qty: 5 TABLET | Refills: 0 | Status: SHIPPED | OUTPATIENT
Start: 2022-03-10 | End: 2022-03-15

## 2022-03-10 RX ORDER — HYDROCODONE BITARTRATE AND ACETAMINOPHEN 7.5; 325 MG/1; MG/1
1 TABLET ORAL ONCE AS NEEDED
Status: DISCONTINUED | OUTPATIENT
Start: 2022-03-10 | End: 2022-03-10 | Stop reason: HOSPADM

## 2022-03-10 RX ORDER — HYDROMORPHONE HYDROCHLORIDE 1 MG/ML
0.5 INJECTION, SOLUTION INTRAMUSCULAR; INTRAVENOUS; SUBCUTANEOUS
Status: DISCONTINUED | OUTPATIENT
Start: 2022-03-10 | End: 2022-03-10 | Stop reason: HOSPADM

## 2022-03-10 RX ORDER — SODIUM CHLORIDE 9 MG/ML
9 INJECTION, SOLUTION INTRAVENOUS CONTINUOUS PRN
Status: DISCONTINUED | OUTPATIENT
Start: 2022-03-10 | End: 2022-03-10 | Stop reason: HOSPADM

## 2022-03-10 RX ORDER — NALOXONE HCL 0.4 MG/ML
0.2 VIAL (ML) INJECTION AS NEEDED
Status: DISCONTINUED | OUTPATIENT
Start: 2022-03-10 | End: 2022-03-10 | Stop reason: HOSPADM

## 2022-03-10 RX ORDER — SUCCINYLCHOLINE CHLORIDE 20 MG/ML
INJECTION INTRAMUSCULAR; INTRAVENOUS AS NEEDED
Status: DISCONTINUED | OUTPATIENT
Start: 2022-03-10 | End: 2022-03-10 | Stop reason: SURG

## 2022-03-10 RX ORDER — DIPHENHYDRAMINE HYDROCHLORIDE 50 MG/ML
12.5 INJECTION INTRAMUSCULAR; INTRAVENOUS
Status: DISCONTINUED | OUTPATIENT
Start: 2022-03-10 | End: 2022-03-10 | Stop reason: HOSPADM

## 2022-03-10 RX ORDER — PROPOFOL 10 MG/ML
VIAL (ML) INTRAVENOUS AS NEEDED
Status: DISCONTINUED | OUTPATIENT
Start: 2022-03-10 | End: 2022-03-10 | Stop reason: SURG

## 2022-03-10 RX ORDER — MAGNESIUM HYDROXIDE 1200 MG/15ML
LIQUID ORAL AS NEEDED
Status: DISCONTINUED | OUTPATIENT
Start: 2022-03-10 | End: 2022-03-10 | Stop reason: HOSPADM

## 2022-03-10 RX ORDER — PROMETHAZINE HYDROCHLORIDE 25 MG/1
25 TABLET ORAL ONCE AS NEEDED
Status: DISCONTINUED | OUTPATIENT
Start: 2022-03-10 | End: 2022-03-10 | Stop reason: HOSPADM

## 2022-03-10 RX ORDER — IBUPROFEN 600 MG/1
600 TABLET ORAL ONCE AS NEEDED
Status: DISCONTINUED | OUTPATIENT
Start: 2022-03-10 | End: 2022-03-10 | Stop reason: HOSPADM

## 2022-03-10 RX ORDER — EPHEDRINE SULFATE 50 MG/ML
5 INJECTION, SOLUTION INTRAVENOUS ONCE AS NEEDED
Status: DISCONTINUED | OUTPATIENT
Start: 2022-03-10 | End: 2022-03-10 | Stop reason: HOSPADM

## 2022-03-10 RX ORDER — ONDANSETRON 2 MG/ML
INJECTION INTRAMUSCULAR; INTRAVENOUS AS NEEDED
Status: DISCONTINUED | OUTPATIENT
Start: 2022-03-10 | End: 2022-03-10 | Stop reason: SURG

## 2022-03-10 RX ORDER — SODIUM CHLORIDE 0.9 % (FLUSH) 0.9 %
3-10 SYRINGE (ML) INJECTION AS NEEDED
Status: DISCONTINUED | OUTPATIENT
Start: 2022-03-10 | End: 2022-03-10 | Stop reason: HOSPADM

## 2022-03-10 RX ORDER — LIDOCAINE HYDROCHLORIDE 10 MG/ML
0.5 INJECTION, SOLUTION EPIDURAL; INFILTRATION; INTRACAUDAL; PERINEURAL ONCE AS NEEDED
Status: DISCONTINUED | OUTPATIENT
Start: 2022-03-10 | End: 2022-03-10 | Stop reason: HOSPADM

## 2022-03-10 RX ORDER — PROMETHAZINE HYDROCHLORIDE 25 MG/1
25 SUPPOSITORY RECTAL ONCE AS NEEDED
Status: DISCONTINUED | OUTPATIENT
Start: 2022-03-10 | End: 2022-03-10 | Stop reason: HOSPADM

## 2022-03-10 RX ORDER — LABETALOL HYDROCHLORIDE 5 MG/ML
5 INJECTION, SOLUTION INTRAVENOUS
Status: DISCONTINUED | OUTPATIENT
Start: 2022-03-10 | End: 2022-03-10 | Stop reason: HOSPADM

## 2022-03-10 RX ORDER — SODIUM CHLORIDE 0.9 % (FLUSH) 0.9 %
3 SYRINGE (ML) INJECTION EVERY 12 HOURS SCHEDULED
Status: DISCONTINUED | OUTPATIENT
Start: 2022-03-10 | End: 2022-03-10 | Stop reason: HOSPADM

## 2022-03-10 RX ORDER — FENTANYL CITRATE 50 UG/ML
50 INJECTION, SOLUTION INTRAMUSCULAR; INTRAVENOUS
Status: DISCONTINUED | OUTPATIENT
Start: 2022-03-10 | End: 2022-03-10 | Stop reason: HOSPADM

## 2022-03-10 RX ORDER — FLUMAZENIL 0.1 MG/ML
0.2 INJECTION INTRAVENOUS AS NEEDED
Status: DISCONTINUED | OUTPATIENT
Start: 2022-03-10 | End: 2022-03-10 | Stop reason: HOSPADM

## 2022-03-10 RX ORDER — HYDROCODONE BITARTRATE AND ACETAMINOPHEN 5; 325 MG/1; MG/1
1-2 TABLET ORAL EVERY 4 HOURS PRN
Qty: 12 TABLET | Refills: 0 | Status: SHIPPED | OUTPATIENT
Start: 2022-03-10 | End: 2022-05-11

## 2022-03-10 RX ORDER — IPRATROPIUM BROMIDE AND ALBUTEROL SULFATE 2.5; .5 MG/3ML; MG/3ML
3 SOLUTION RESPIRATORY (INHALATION) ONCE AS NEEDED
Status: DISCONTINUED | OUTPATIENT
Start: 2022-03-10 | End: 2022-03-10 | Stop reason: HOSPADM

## 2022-03-10 RX ORDER — HYDRALAZINE HYDROCHLORIDE 20 MG/ML
5 INJECTION INTRAMUSCULAR; INTRAVENOUS
Status: DISCONTINUED | OUTPATIENT
Start: 2022-03-10 | End: 2022-03-10 | Stop reason: HOSPADM

## 2022-03-10 RX ORDER — LIDOCAINE HYDROCHLORIDE 20 MG/ML
INJECTION, SOLUTION INFILTRATION; PERINEURAL AS NEEDED
Status: DISCONTINUED | OUTPATIENT
Start: 2022-03-10 | End: 2022-03-10 | Stop reason: SURG

## 2022-03-10 RX ORDER — DIPHENHYDRAMINE HCL 25 MG
25 CAPSULE ORAL
Status: DISCONTINUED | OUTPATIENT
Start: 2022-03-10 | End: 2022-03-10 | Stop reason: HOSPADM

## 2022-03-10 RX ORDER — CEFAZOLIN SODIUM 1 G/50ML
INJECTION, SOLUTION INTRAVENOUS AS NEEDED
Status: DISCONTINUED | OUTPATIENT
Start: 2022-03-10 | End: 2022-03-10 | Stop reason: SURG

## 2022-03-10 RX ORDER — FAMOTIDINE 10 MG/ML
20 INJECTION, SOLUTION INTRAVENOUS ONCE
Status: COMPLETED | OUTPATIENT
Start: 2022-03-10 | End: 2022-03-10

## 2022-03-10 RX ORDER — MIDAZOLAM HYDROCHLORIDE 1 MG/ML
1 INJECTION INTRAMUSCULAR; INTRAVENOUS
Status: DISCONTINUED | OUTPATIENT
Start: 2022-03-10 | End: 2022-03-10 | Stop reason: HOSPADM

## 2022-03-10 RX ORDER — ROCURONIUM BROMIDE 10 MG/ML
INJECTION, SOLUTION INTRAVENOUS AS NEEDED
Status: DISCONTINUED | OUTPATIENT
Start: 2022-03-10 | End: 2022-03-10 | Stop reason: SURG

## 2022-03-10 RX ORDER — CEFAZOLIN SODIUM 2 G/100ML
2 INJECTION, SOLUTION INTRAVENOUS ONCE
Status: COMPLETED | OUTPATIENT
Start: 2022-03-10 | End: 2022-03-10

## 2022-03-10 RX ORDER — FENTANYL CITRATE 50 UG/ML
INJECTION, SOLUTION INTRAMUSCULAR; INTRAVENOUS AS NEEDED
Status: DISCONTINUED | OUTPATIENT
Start: 2022-03-10 | End: 2022-03-10 | Stop reason: SURG

## 2022-03-10 RX ORDER — ONDANSETRON 2 MG/ML
4 INJECTION INTRAMUSCULAR; INTRAVENOUS ONCE AS NEEDED
Status: DISCONTINUED | OUTPATIENT
Start: 2022-03-10 | End: 2022-03-10 | Stop reason: HOSPADM

## 2022-03-10 RX ORDER — DEXAMETHASONE SODIUM PHOSPHATE 10 MG/ML
INJECTION INTRAMUSCULAR; INTRAVENOUS AS NEEDED
Status: DISCONTINUED | OUTPATIENT
Start: 2022-03-10 | End: 2022-03-10 | Stop reason: SURG

## 2022-03-10 RX ORDER — OXYCODONE AND ACETAMINOPHEN 7.5; 325 MG/1; MG/1
1 TABLET ORAL EVERY 4 HOURS PRN
Status: DISCONTINUED | OUTPATIENT
Start: 2022-03-10 | End: 2022-03-10 | Stop reason: HOSPADM

## 2022-03-10 RX ADMIN — FAMOTIDINE 20 MG: 10 INJECTION INTRAVENOUS at 09:25

## 2022-03-10 RX ADMIN — MIDAZOLAM 1 MG: 1 INJECTION INTRAMUSCULAR; INTRAVENOUS at 09:25

## 2022-03-10 RX ADMIN — CEFAZOLIN SODIUM 1 G: 1 INJECTION, SOLUTION INTRAVENOUS at 11:37

## 2022-03-10 RX ADMIN — SUCCINYLCHOLINE CHLORIDE 200 MG: 20 INJECTION, SOLUTION INTRAMUSCULAR; INTRAVENOUS; PARENTERAL at 11:32

## 2022-03-10 RX ADMIN — CEFAZOLIN SODIUM 2 G: 2 INJECTION, SOLUTION INTRAVENOUS at 11:18

## 2022-03-10 RX ADMIN — ONDANSETRON 4 MG: 2 INJECTION INTRAMUSCULAR; INTRAVENOUS at 11:55

## 2022-03-10 RX ADMIN — PROPOFOL 250 MG: 10 INJECTION, EMULSION INTRAVENOUS at 11:32

## 2022-03-10 RX ADMIN — DEXAMETHASONE SODIUM PHOSPHATE 8 MG: 10 INJECTION INTRAMUSCULAR; INTRAVENOUS at 11:37

## 2022-03-10 RX ADMIN — SODIUM CHLORIDE 9 ML/HR: 9 INJECTION, SOLUTION INTRAVENOUS at 09:21

## 2022-03-10 RX ADMIN — LIDOCAINE HYDROCHLORIDE 100 MG: 20 INJECTION, SOLUTION INFILTRATION; PERINEURAL at 11:32

## 2022-03-10 RX ADMIN — SUGAMMADEX 600 MG: 100 INJECTION, SOLUTION INTRAVENOUS at 12:03

## 2022-03-10 RX ADMIN — FENTANYL CITRATE 100 MCG: 0.05 INJECTION, SOLUTION INTRAMUSCULAR; INTRAVENOUS at 11:31

## 2022-03-10 RX ADMIN — ROCURONIUM BROMIDE 20 MG: 50 INJECTION INTRAVENOUS at 11:46

## 2022-03-10 NOTE — ANESTHESIA PREPROCEDURE EVALUATION
Anesthesia Evaluation     Patient summary reviewed and Nursing notes reviewed   NPO Solid Status: > 8 hours  NPO Liquid Status: > 2 hours           Airway   Mallampati: II  TM distance: <3 FB  Neck ROM: full  Possible difficult intubation  Dental - normal exam     Pulmonary - normal exam   (+) sleep apnea on CPAP,   Cardiovascular     ECG reviewed  Rhythm: regular  Rate: normal    (+) hypertension 2 medications or greater, valvular problems/murmurs murmur, AS, MR, MS and TI, CHF , murmur, hyperlipidemia,     ROS comment: Bicuspid aortic valve/EF 56%, mod AS, mild MS, trace MR, mild TR by ECHO 7/21  PE comment: ALEJANDRO at [facility]    Neuro/Psych  (+) weakness, numbness, psychiatric history Anxiety and Depression,      ROS Comment: Peripheral neuropathy bilateral feet  GI/Hepatic/Renal/Endo    (+) obesity, morbid obesity,  renal disease stones, ESRD and dialysis, diabetes mellitus type 2, thyroid problem hypothyroidism    Musculoskeletal     Abdominal   (+) obese,    Substance History      OB/GYN          Other   arthritis, blood dyscrasia anemia,   history of cancer      Other Comment: Hx breast CA  Hgb 8.1      Phys Exam Other: AV fistula left arm                  Anesthesia Plan    ASA 4     general   (CMAC used last intubation)  intravenous induction     Anesthetic plan, all risks, benefits, and alternatives have been provided, discussed and informed consent has been obtained with: patient.

## 2022-03-10 NOTE — ANESTHESIA PROCEDURE NOTES
Airway  Urgency: elective    Date/Time: 3/10/2022 11:35 AM  Difficult airway    General Information and Staff    Patient location during procedure: OR  Anesthesiologist: Leodan Samuel MD  CRNA: Fernanda Mendieta CRNA    Indications and Patient Condition  Indications for airway management: airway protection    Preoxygenated: yes  MILS maintained throughout  Mask difficulty assessment: 2 - vent by mask + OA or adjuvant +/- NMBA    Final Airway Details  Final airway type: endotracheal airway      Successful airway: ETT  Cuffed: yes   Successful intubation technique: video laryngoscopy  Facilitating devices/methods: intubating stylet and cricoid pressure  Endotracheal tube insertion site: oral  Blade: CMAC  Blade size: D  ETT size (mm): 7.0  Cormack-Lehane Classification: grade I - full view of glottis  Placement verified by: chest auscultation and capnometry   Cuff volume (mL): 7  Measured from: teeth  ETT/EBT  to teeth (cm): 20  Number of attempts at approach: 1  Assessment: lips, teeth, and gum same as pre-op and atraumatic intubation    Additional Comments  [facility] utilized first attempt R/T history of difficult AW.

## 2022-03-10 NOTE — H&P
FIRST UROLOGY CONSULT      Patient Identification:  NAME:  Juana Hall  Age:  61 y.o.   Sex:  female   :  1960   MRN:  [id number]       Chief complaint: Left hydronephrosis    History of present illness:  This is a 61 year old woman with metastatic breast cancer who presents for cystoscopy and left ureteral stent exchange. We discussed all risks benefits and alternatives. She wishes to proceed.      Past medical history:  Past Medical History:   Diagnosis Date   • Anemia    • Anxiety and depression    • Bicuspid aortic valve    • Breast cancer (HCC)     RIGHT, HAD NEELA. MASTECTOMY, CHEMO AND RADIATION   • CHF (congestive heart failure) (HCC)    • CKD (chronic kidney disease)    • Diabetes mellitus (HCC)    • Dialysis patient (HCC)     TUES AND SATURDAY [facility]   • Elevated cholesterol    • Frequent UTI    • Heart murmur    • History of kidney stones    • History of MRSA infection     POST BREAST SURGERY-TREATED [facility]   • History of sepsis     KIDNEY STONE   • History of transfusion    • Hyperlipidemia    • Hypertension    • Hyperthyroidism    • Hypothyroidism    • Kidney stone     CURRENT LEFT-DEC 2021   • Lymphedema of leg     LEFT   • Metastasis from breast cancer (HCC)     STOMACH-OMENTUM   • Neuromuscular disorder (HCC)    • Obesity    • ANDREW on CPAP     CPAP   • Osteoarthrosis    • Peripheral neuropathy     FEET BILAT   • Radiculopathy    • Thickened endometrium    • Weakness     BILAT LEGS-WITH ANY AMT OF TIME       Past surgical history:  Past Surgical History:   Procedure Laterality Date   • ARTERIOVENOUS FISTULA/SHUNT SURGERY Left 11/3/2021    Procedure: LEFT  BRACHIOCEPALIC ARTERIOVENOUS FISTULA;  Surgeon: Dejuan Adler MD;  Location: [facility];  Service: Vascular;  Laterality: Left;   • BREAST RECONSTRUCTION      WITH IMPLANTS, AND REVISION, NOW REMOVED   • BREAST SURGERY     • CATARACT EXTRACTION WITH INTRAOCULAR LENS IMPLANT Bilateral    •   SECTION  1987   •  SECTION  1987   • CYSTOSCOPY W/ URETERAL STENT PLACEMENT     • CYSTOSCOPY W/ URETERAL STENT PLACEMENT Left 2021    Procedure: CYSTOSCOPY KEFT RETROGRADE PYELOGRAM  LEFT  URETERAL STENT PLACEMENT;  Surgeon: Leodan Mckee Jr., MD;  Location: [facility] MAIN OR;  Service: Urology;  Laterality: Left;   • D & C HYSTEROSCOPY N/A 2017    Procedure: DILATATION AND CURETTAGE, HYSTEROSCOPY ;  Surgeon: Cherie Oliveros MD;  Location:  [facility];  Service:    • D & C HYSTEROSCOPY N/A 2019    Procedure: DILATATION AND CURETTAGE HYSTEROSCOPY/POLYPECTOMY;  Surgeon: Cherie Oliveros MD;  [facility];  Service: Gynecology   • INSERTION HEMODIALYSIS CATHETER Right 2021    Procedure: HEMODIALYSIS CATHETER INSERTION;  Surgeon: Clint Hernández MD;  Location: [facility] MAIN OR;  Service: Vascular;  Laterality: Right;   • LYMPH NODE BIOPSY     • MASTECTOMY Bilateral        Allergies:  Patient has no known allergies.    Home medications:  Medications Prior to Admission   Medication Sig Dispense Refill Last Dose   • acetaminophen (TYLENOL) 500 MG tablet Take 500 mg by mouth As Needed.   Past Week at Unknown time   • amLODIPine (NORVASC) 5 MG tablet Take 1 tablet by mouth Daily. 90 tablet 3 3/9/2022 at 0830   • aspirin 81 MG EC tablet Take 81 mg by mouth Daily. HELD FOR SURGERY   Past Week at Unknown time   • atorvastatin (LIPITOR) 40 MG tablet Take 1 tablet by mouth Every Night. 90 tablet 1 3/9/2022 at 1900   • carvedilol (COREG) 3.125 MG tablet Take 1 tablet by mouth 2 (Two) Times a Day. 180 tablet 3 3/9/2022 at 1900   • folic acid (FOLVITE) 1 MG tablet TAKE ONE TABLET BY MOUTH DAILY (Patient taking differently: Take 1 mg by mouth Daily.) 30 tablet 2 3/9/2022 at 0830   • Levemir FlexTouch 100 UNIT/ML injection Inject 40 Units under the skin into the appropriate area as directed Daily. 13 pen 1 3/9/2022 at 0830   • levothyroxine (SYNTHROID,  LEVOTHROID) 50 MCG tablet Take 1 tablet by mouth Daily. 90 tablet 1 3/9/2022 at 0830   • Palbociclib 100 MG tablet tablet Take 1 tablet by mouth Daily. Take for 7 days on, then 7 days off. (Patient taking differently: Take 100 mg by mouth Take As Directed. Take for 7 days on, then 7 days off.) 14 tablet 6 3/9/2022 at 0830   • QUEtiapine (SEROquel) 25 MG tablet Take 1 tablet by mouth Every Night. 30 tablet 1 3/10/2022 at 0000   • sertraline (ZOLOFT) 100 MG tablet Take 1.5 tablets by mouth Daily. (Patient taking differently: Take 150 mg by mouth Every Night.) 135 tablet 0 3/9/2022 at 1930   • vitamin B-12 (CYANOCOBALAMIN) 1000 MCG tablet Take 1,000 mcg by mouth Daily.   3/9/2022 at 0830   • vitamin D (ERGOCALCIFEROL) 36519 units capsule capsule Take 50,000 Units by mouth Every 7 (Seven) Days. WEDNESDAY   3/9/2022 at 0830   • amoxicillin (AMOXIL) 500 MG capsule TAKE 4 CAPSULES BY MOUTH 1 HOUR PRIOR TO DENTAL APPOINTMENT (Patient taking differently: Take 2,000 mg by mouth Take As Directed. Take 4 capsules by mouth 1 hour prior to dental appointment) 4 capsule 1 More than a month at Unknown time   • ondansetron (Zofran) 8 MG tablet Take 1 tablet by mouth Every 8 (Eight) Hours As Needed for Nausea or Vomiting. 30 tablet 2         Hospital medications:  ceFAZolin, 2 g, Intravenous, Once  sodium chloride, 3 mL, Intravenous, Q12H      sodium chloride, 9 mL/hr, Last Rate: 9 mL/hr (03/10/22 0921)      fentanyl  •  lidocaine PF 1%  •  midazolam  •  sodium chloride  •  sodium chloride    Family history:  Family History   Problem Relation Age of Onset   • Coronary artery disease Mother         Mother  from MI at 72   • Diabetes Mother    • Breast cancer Mother    • Hyperlipidemia Mother    • Arthritis Mother    • Cancer Mother    • Aortic aneurysm Father         Father with ruptured thoracic aortic aneurysm   • Coronary artery disease Father         Father  from MI at 74   • Heart disease Father    •  Hyperlipidemia Father    • Arthritis Father    • Coronary artery disease Maternal Grandmother         MGM deceeased from MI at age 76   • Malig Hyperthermia Neg Hx        Social history:  Social History     Tobacco Use   • Smoking status: Never Smoker   • Smokeless tobacco: Never Used   Vaping Use   • Vaping Use: Never used   Substance Use Topics   • Alcohol use: No   • Drug use: No       Review of systems:      Positive for:  As per HPI  Negative for:  As per HPI    Objective:  TMax 24 hours:   Temp (24hrs), Av.2 °F (36.8 °C), Min:98.2 °F (36.8 °C), Max:98.2 °F (36.8 °C)      Vitals Ranges:   Temp:  [98.2 °F (36.8 °C)] 98.2 °F (36.8 °C)  Heart Rate:  [64-66] 64  Resp:  [18] 18  BP: (141)/(56) 141/56    Intake/Output Last 3 shifts:  No intake/output data recorded.     Physical Exam:    General Appearance:    Alert, cooperative, NAD                                       Neuro/Psych:   Orientation intact, mood/affect pleasant, no focal findings       Results review:   I reviewed the patient's new clinical results.    Data review:  Lab Results (last 24 hours)     Procedure Component Value Units Date/Time    Basic Metabolic Panel [[id number]]  (Abnormal) Collected: 03/10/22 0905    Specimen: Blood Updated: 03/10/22 0937     Glucose 156 mg/dL      BUN 51 mg/dL      Creatinine 4.25 mg/dL      Sodium 141 mmol/L      Potassium 4.7 mmol/L      Chloride 105 mmol/L      CO2 20.8 mmol/L      Calcium 8.7 mg/dL      BUN/Creatinine Ratio 12.0     Anion Gap 15.2 mmol/L      eGFR 11.2 mL/min/1.73      Comment: <15 Indicative of kidney failure       Narrative:      GFR Normal >60  Chronic Kidney Disease <60  Kidney Failure <15      POC Glucose Once [[id number]]  (Abnormal) Collected: 03/10/22 0835    Specimen: Blood Updated: 03/10/22 0836     Glucose 147 mg/dL      Comment: Meter: [id number] : [id number] [place] GAMING              Imaging:  Imaging Results (Last 24 Hours)     ** No results found for the last 24 hours.  **             Assessment:       * No active hospital problems. *    Left hydronephrosis    Plan:     Cystoscopy, left ureteral stent exchange    Leoadn Mckee Jr., MD  03/10/22  11:14 EST

## 2022-03-10 NOTE — OP NOTE
Operative Report    [facility]    Patient: Juana Hall  Age:      61 y.o.  :     1960  Sex:      female    Medical Record:  [id number]    Date of Operation/Procedure:  3/10/2022    Pre-op Diagnosis:   Left hydronephrosis    Metastatic breast cancer    Post-Op Diagnosis Codes:   Same    Pre-operative Diagnosis Free Text:  * No pre-op diagnosis entered *     Name of Operation/Procedure:  Procedure(s) and Anesthesia Type:     * CYSTOSCOPY AND LEFT URETERAL STENT EXCHANGE, RETROGRADE PYELOGRAM - General    Findings/Complications:  No complications.    Left retrograde pyelogram: No hydronephrosis. No filling defects or extravasation. Narrowing of the left mid ureter.    Description of procedure: The patient was taken to the OR and placed under GA in lithotomy position.  Prepped and draped in sterile fashion.  The 21 Fr cystoscope was introduced and pancystoscopy was performed.  No tumors or stones were seen.  A Sensor guidewire was passed through the left ureteral orifice into the kidney without difficulty. A Denison catheter was inserted, and a retrograde pyelogram was performed with findings as above. A 6 Fr x 28 cm black beauty JJ stent was passed into the kidney into the proper anatomic position.    Estimated Blood Loss: minimal    Specimens: * No orders in the log *    Fluids/Drains: ureteral stent    Leodan Mckee Jr., MD  3/10/2022  12:59 EST

## 2022-03-10 NOTE — ANESTHESIA POSTPROCEDURE EVALUATION
"Patient: uJana Hall    Procedure Summary     Date: 03/10/22 Room / Location: [facility] 01 / [facility]    Anesthesia Start: 1123 Anesthesia Stop: 1222    Procedure: CYSTOSCOPY AND LEFT URETERAL STENT EXCHANGE, RETROGRADE PYELOGRAM (Left ) Diagnosis:     Surgeons: Leodan Mckee Jr., MD Provider: Leodan Samuel MD    Anesthesia Type: general ASA Status: 4          Anesthesia Type: general    Vitals  Vitals Value Taken Time   /54 03/10/22 1231   Temp 36.7 °C (98.1 °F) 03/10/22 1220   Pulse 64 03/10/22 1236   Resp 16 03/10/22 1220   SpO2 96 % 03/10/22 1236   Vitals shown include unvalidated device data.        Post Anesthesia Care and Evaluation    Patient location during evaluation: bedside  Patient participation: complete - patient participated  Level of consciousness: awake and alert  Pain management: adequate  Airway patency: patent  Anesthetic complications: No anesthetic complications    Cardiovascular status: acceptable  Respiratory status: acceptable  Hydration status: acceptable    Comments: /74 (BP Location: Right arm, Patient Position: Lying)   Pulse 104   Temp 36.7 °C (98.1 °F) (Oral)   Resp 16   Ht 170.2 cm (67\")   Wt (!) 158 kg (349 lb 3.3 oz)   LMP  (LMP Unknown)   SpO2 98%   BMI 54.69 kg/m²       "

## 2022-03-15 NOTE — PROGRESS NOTES
"Chief Complaint  Metastatic lobular breast cancer (ER/CT positive, HER-2/tj negative), anemia secondary to CKD3, CHF, peripheral neuropathy, chemotherapy related nausea chemotherapy-induced myelosuppression    Subjective        History of Present Illness  The patient returns today in follow-up continuing currently on Faslodex and Ibrance.  She he is due today for cycle 5 day 1 Faslodex.  She is receiving Ibrance at a dose of 100 mg daily for 7 days on followed by 7 days off.  Since her last visit here, the patient did require transfusion support for hemoglobin down to 6.9 on 3/2/2022.  Hemoglobin subsequently improved to 8.1 on 3/8/2022.  She also underwent replacement of left ureteral stent on 3/10/2022 with Dr. Mckee.  She does note some pain for a few days after the stent was placed, did not experience any hematuria.  She continues on hemodialysis every Monday and Friday.  She is receiving Procrit 20,000 units at each dialysis appointment and is receiving IV iron every Monday.  She reports tolerating Ibrance very well.  She is completing her week of Ibrance today.  She is not having any significant side effects.  She denies any nausea or vomiting.  She reports a normal appetite.  She reports that she feels better than she has in quite some time.  She denies any abdominal pain.  She does note that dialysis access was discussed with Dr. Adler.  He believes there is approximately 20% chance of her fistula functioning.  They are going to try her fistula later this week.  If it does not function they are going to return to using her tunneled catheter which remains in place.       Objective   Vital Signs:   /54   Pulse 62   Temp 98.6 °F (37 °C) (Temporal)   Resp 18   Ht 170.2 cm (67.01\")   Wt (!) 156 kg (345 lb)   SpO2 99%   BMI 54.02 kg/m²     Physical Exam  Constitutional:       Appearance: She is well-developed. She is obese.      Comments: Patient is in a motorized scooter today   Eyes:      " Conjunctiva/sclera: Conjunctivae normal.   Neck:      Thyroid: No thyromegaly.   Cardiovascular:      Rate and Rhythm: Normal rate and regular rhythm.      Heart sounds: Murmur heard.     No friction rub. No gallop.      Comments: 2/6 murmur  Pulmonary:      Effort: No respiratory distress.      Breath sounds: Normal breath sounds.      Comments: Dialysis access in place in the right subclavian position  Chest:   Breasts:      Right: No supraclavicular adenopathy.      Left: No supraclavicular adenopathy.       Abdominal:      General: Bowel sounds are normal. There is no distension.      Palpations: Abdomen is soft.      Tenderness: There is no abdominal tenderness.   Musculoskeletal:         General: Swelling present.      Comments: 1+ bilateral lower extremity edema with venous stasis changes noted.   Lymphadenopathy:      Head:      Right side of head: No submandibular adenopathy.      Cervical: No cervical adenopathy.      Upper Body:      Right upper body: No supraclavicular adenopathy.      Left upper body: No supraclavicular adenopathy.   Skin:     General: Skin is warm and dry.      Findings: No bruising or rash.   Neurological:      Mental Status: She is alert and oriented to person, place, and time.      Cranial Nerves: No cranial nerve deficit.      Motor: No abnormal muscle tone.      Deep Tendon Reflexes: Reflexes normal.   Psychiatric:         Behavior: Behavior normal.            Result Review : Reviewed CBC, CMP from today.  Reviewed operative note from stent placement.       Assessment and Plan    *Metastatic lobular breast cancer (ER/IN positive, HER-2/tj negative):  · Previous stage IIIC right breast cancer in 2003, received neoadjuvant chemotherapy (unknown regimen) with subsequent bilateral mastectomies 1/22/2004 with right axillary dissection.  Residual 10-12 cm invasive lobular carcinoma on the right breast with 14/16+ nodes with extranodal extension.  Received adjuvant carboplatin and  Navelbine chemotherapy x4 cycles  · Attempted adjuvant radiation to the chest wall however interrupted due to cellulitis that required removal of implants in August 2004  · Received tamoxifen from 6/22/2004 until June 2009.  Transitioned to Femara and received until June 2014  · Identification of CT findings concerning for carcinomatosis on CT scans and June 2019.  · PET scan confirmed hypermetabolic activity in the omentum as well as small retroperitoneal lymph nodes.  · CT-guided omental biopsy 7/30/2019 with metastatic lobular carcinoma of breast primary, ER positive (greater than 95%), DC positive (90%), HER-2/tj negative (1+ IHC)  · [facility] liquid biopsy 2/5/2020: MSI undetermined, mutations in BETH, NF1, CHEK2.  No evidence of PIK3CA mutation.  · Subsequent [facility] germline testing 11/12/2021 with BETH VUS (c.2864C>A) and POLE VUS (c.631A>T)  · Initiation of Aromasin and Ibrance in August 2019  · Difficulty with myelosuppression related to Ibrance requiring dose alterations, eventually changed to 100 mg for 7 days on followed by 7 days off.  · CT chest abdomen pelvis 10/26/2021 with increase in left hydronephrosis with increase in left perinephric and periureteral stranding. Decrease in stone in the left kidney lower pole (7 mm).  Stable small retroperitoneal lymph and left periaortic lymph nodes.  · PET scan 11/10/2021 with multiple bilateral hypermetabolic nodular pulmonary lesions, asymmetric moderate to severe left hydronephrosis with ill-defined fat stranding and soft tissue thickening in the renal sinus and surrounding the left ureter, hypermetabolic left retroperitoneal lymph node with slight increase in size, ill-defined hypermetabolic thickening in the inferior omentum with increase in size, uptake and C7 (indeterminate, recommended MRI).  Short segments of uptake in the mid and distal esophagus possibly related to gastritis.  · PET scan findings felt to be consistent with progressive  malignancy.  Patient declined repeat omental biopsy.   · Options for further treatment discussed on 11/12/2021.  In light of renal failure and dialysis, options are more limited.  Recommendation to continue Ibrance and discontinue Aromasin, begin Faslodex.  Discussed possible future use of single agent Taxol.    · Aromasin discontinued 11/12/2021  · On 11/22/2021 initiation of Faslodex with continuation of Ibrance (100 mg daily for 7 days on followed by 7 days off).  · MRI cervical spine 11/18/2021 showed the right C7 normality to likely represent metastatic disease.  With solitary bone lesion, did not recommend pursuit of bone modifying agent.  · Following 2 cycles Faslodex/Ibrance, PET scan on 1/11/2022 showed no change in the soft tissue superior to the dome of the bladder with hypermetabolic activity (likely related to carcinomatosis).  Significant decrease in injection of fat around the left renal pelvis and stable retroperitoneal hypermetabolic lymph nodes, decrease in size and activity in small bilateral pulmonary nodules.  Findings felt to represent evidence of response to treatment.    · Ibrance held briefly beginning 1/28/2022 due to hospitalization with COVID-19 infection and C. difficile colitis.  Patient developed leukopenia/neutropenia.  Ibrance resumed at previous dosing (100 mg daily 7 days on followed by 7 days off) on 2/9/22.  · The patient returns today in follow-up continuing on Faslodex and Ibrance.  She is due for cycle 5-day 1 Faslodex 500 mg IM today.  She continues on Ibrance 100 mg daily 7 days on followed by 7 days off.  She has continued to tolerate treatment relatively well.  She did require additional transfusions for due to hemoglobin down to 6.9 on 3/2/2022.  Hemoglobin did improve by 3/8/2022 up to 8.1.  She did undergo replacement of her left ureteral stent on 3/10/2022 with Dr. Mckee.  She reports a few days of mild pain after stent placement which resolved.  No hematuria.  She is  tolerating treatment extremely well with no significant side effects.  She is completing a week of Ibrance today.  She has leukopenia but no neutropenia with ANC 1.64.  Hemoglobin today is 8 and she does not require transfusion support.  We will proceed on with treatment as planned with Faslodex today.  She will continue her current course of Ibrance.  She will have CBC and RN review in 2 weeks.  In 3 weeks to undergo PET scan (3-month interval) and I will see her in 4 weeks for follow-up when she is due for cycle 6 Faslodex.    *Anemia secondary to ESRD, exacerbated by Ibrance:  · Anemia secondary to ESRD, malignancy with exacerbation by use of Ibrance  · Previous evidence of folate deficiency 8/12/2019 with level 3.84, initiated folic acid 1 mg daily  · Previous evidence of iron deficiency, received Injectafer on 8/7/2020 750 mg IV x1 dose.  Second dose not administered due to onset of fever, subsequent iron studies precluded further administration of IV iron.  · Previous low normal B12 level initiated oral B12 1000 mcg daily.  · Patient had previously received Procrit and required intermittent transfusion support  · Following initiation of hemodialysis, management of BEAU transitioned to nephrology, receiving Epogen with dialysis.  · Ongoing transfusion support prompted alteration in Ibrance dosing on 8/23/2021.  · After alteration in Ibrance dosing, hemoglobin improved and remained stable in the 9-10 range.  · Improved but ongoing intermittent need for transfusion support despite Ibrance dose alteration  · Stool negative for occult blood x3 on 11/2/2021  · Patient with reduced dialysis frequency to 2 days/week with concurrent decrease in Epogen dosing corresponding again to increased transfusion requirement.  · Epogen dose increased per nephrology to 20,000 units twice weekly with dialysis on Mondays and Fridays  · Patient returns today with hemoglobin of 8.0.  As above she is continuing on Epogen 20,000 units  every Monday and Friday with dialysis.  She also reports receiving IV iron every Monday.  She did require transfusion 1 unit PRBC due to hemoglobin of 6.9 on 3/2/2022.  H we will plan to continue transfusion support for hemoglobin less than 7.0.  We will recheck CBC in 2 weeks and again in 4 weeks when I see her back for follow-up.      *ESRD on HD:  · Patient with previous CKD3/4  · Hospitalization 4/29-5/4/2021 with RYAN/CKD, UTI, anemia.  Required initiation of hemodialysis Tuesday Thursday Saturday.   · Decrease in frequency of dialysis to twice per week.  She continues to produce a significant amount of urine.    · Status post left upper extremity AV fistula placement on 11/3/2021 by vascular surgery  · Attempt to hold further dialysis on 1/11/2022 with close monitoring of her laboratory and clinical parameters.  Dialysis quickly resumed due to development of hyperkalemia.  · Patient continues on dialysis 2 days/week on Mondays and Fridays.  Per patient report potential future consideration to hold dialysis again in the future.  Patient is followed closely by Dr. Antonio.  Patient did recently discuss her  dialysis access with Dr. Adler and I discussed with him as well.  There is an approximately 20% chance her fistula may function and they were going to attempt using it  Friday this week.  If it does not function Dr. Adler does not plan at this time to pursue any further surgical intervention for access placement but would return to using her tunneled catheter.    *CHF with mild to moderate aortic stenosis:  · Echocardiogram 7/12/2019 showing ejection fraction 48% with moderate dilation of the LV cavity, global hypokinesis, grade 2 diastolic dysfunction, mild to moderate aortic stenosis, trivial pericardial effusion.  · Echocardiogram 7/19/2021 with ejection fraction 56%, left atrial volume moderately increased, grade 2 diastolic dysfunction, severe calcification aortic valve, moderate aortic stenosis, severe  mitral calcification, mild mitral stenosis, marked elevation in RVSP from tricuspid regurgitation.  · Patient notes that she discussed echo results with cardiology and valvular status was felt to be stable in regards to aortic stenosis.    *Left upper and bilateral lower extremity peripheral neuropathy:  · Patient reports that left upper extremity neuropathy was not present from previous chemotherapy but occurred after hospitalization that required intubation and critical care stay.  Apparently felt to represent critical care neuropathy.  Improved over time.  · Bilateral lower extremity neuropathy felt to be related to diabetes  · May have future implications regarding treatment for breast cancer.  · Patient reports that peripheral neuropathy symptoms continue in the bilateral lower extremities, unchanged    *Uterine/endometrial activity on PET scan:  · Patient experienced postmenopausal vaginal bleeding in 2013, negative endometrial biopsy and gynecologic evaluation.  · With findings on PET scan with enlarging uterus and some hypermetabolic activity, referred back to Dr. Oliveros in gynecology.    · 9/19/2019 D&C with hysteroscopy by Dr. Oliveros, no significant intraoperative findings, pathologic results with benign findings, no evidence of malignancy.    *Nausea:  · Mild, relieved with Zofran.   · Patient experienced improvement in nausea after initiating hemodialysis  · No recent nausea    *Myelosuppression secondary to Ibrance:  · WBC during hospitalization 1/28-1/30/2022 declined into the 1.9-2.0 range due to concurrent COVID-19 infection.  Ibrance was held briefly.  · Ibrance resumed on 2/9/2022 with WBC up to 4.49, ANC 3.07  · Today, WBC 2.77, ANC 1.64.    *Depression  · Patient with history of depression, worsened after the death of her son in November 2021  · With evidence of progressive malignancy in November 2021, patient agreeable to referral to supportive oncology  · Patient was seen in supportive oncology  11/18/2021, Zoloft increased from 50 up to 100 mg daily.  Prescribed armodafinil 50 mg daily however there or issues with insurance coverage  · Patient reports that her symptoms have improved.    *Left perinephric stranding secondary to malignancy with hydronephrosis:  · CT 4/22/2021 showed interval enlargement of 2 staghorn calculi in the left kidney with persistent left perinephric stranding  · Hospitalization 4/29-5/4/2021 with RYAN/CKD, UTI, anemia.  Required 2 unit PRBC transfusion and initiated hemodialysis Tuesday Thursday Saturday.    · Discussion from urology regarding potential need for surgical procedure to address staghorn calculus left kidney  · CT scan 7/2/2021 with only 1 remaining left renal stone identified which had decreased in size.  Patient appears to have passed the other stone.  · As above, CT 10/26/2021 with more prominent left hydronephrosis and increase in left perinephric and periureteral stranding.  Felt to possibly be related to passage of recent stone versus partial obstruction secondary to debris or thrombus in the left ureter versus pyelonephritis.  Patient however with no clinical evidence of recent stone passage nor pyelonephritis. Malignancy felt to be the cause of CT changes around the left kidney at this point.   · Left ureteral stent replacement 12/16/2021  · PET scan 1/11/2022 with decrease in fat injection near left renal pelvis, stent in satisfactory position.  Mild residual hydronephrosis on the left.  · Ureteral stent replaced on 3/10/2022    *Right thyroid nodules  · Patient underwent prior thyroid biopsy by her report with benign findings many years ago, records not available  · On MRI cervical spine 11/18/2021, finding of 1.8 cm right thyroid nodule  · Thyroid ultrasound 11/26/2021 with right-sided thyroid nodules, 1 nodule was hypoechoic, solid measuring 2 cm with biopsy recommended.  · Thyroid ultrasound results reviewed with patient.  In light of her metastatic breast  cancer, renal failure the significance of the thyroid nodule is felt to be much less.  We discussed possibility of thyroid biopsy however patient is inclined to forego this, especially since she has had a biopsy performed in the past with benign findings by her report.    · No hypermetabolic activity identified on PET scan 1/11/2022 in the neck    *Hospitalization 1/28-1/30/2022 due to COVID-19 infection and C. difficile colitis  · Patient fully vaccinated with 3 doses Moderna COVID-19 vaccine  · Patient developed symptoms 1/26/2022 with abrupt onset sinus congestion, mild cough, subsequently developed nausea and diarrhea on 1/27/2022.  Significant fatigue.  · Patient tested positive in emergency department on 1/28/2022 and was admitted  · Patient maintained O2 saturation in mid 90% range on room air  · Patient received remdesivir  · Patient was also found to be positive for C. difficile colitis, received course of oral vancomycin.  · Today, patient returns reporting that symptoms from her COVID-19 infection and C. difficile colitis have largely resolved.  She reports minimal residual cough and congestion, no further diarrhea.    Plan:  1. Proceed with cycle 5 -day 1 Faslodex 500 mg IM injection today  2. Continue Ibrance 100 mg daily for 7 days on followed by 7 days off (currently completing a week of Ibrance that was initiated ).  3. Continue oral folic acid 1 mg daily, B12 1000 mcg daily.  4. The patient is continuing on hemodialysis every Monday and Friday and is receiving Epogen 20,000 units with each dialysis per nephrology.  Patient is also receiving IV iron every Monday with dialysis.  5. Patient is aware that if she has an upcoming break from hemodialysis she would need to continue on BEAU at least weekly either with nephrology or through our office with Procrit.  6. In 2 weeks CBC and RN review  7. In 3 weeks PET scan  8. MD visit in 4 weeks with CBC, CMP.  Patient will be due for cycle 6 Faslodex pending  scan results    Patient continues on high risk medication requiring intensive monitoring.

## 2022-03-16 ENCOUNTER — SPECIALTY PHARMACY (OUTPATIENT)
Dept: ONCOLOGY | Facility: HOSPITAL | Age: 62
End: 2022-03-16

## 2022-03-16 ENCOUNTER — LAB (OUTPATIENT)
Dept: LAB | Facility: HOSPITAL | Age: 62
End: 2022-03-16

## 2022-03-16 ENCOUNTER — INFUSION (OUTPATIENT)
Dept: ONCOLOGY | Facility: HOSPITAL | Age: 62
End: 2022-03-16

## 2022-03-16 ENCOUNTER — OFFICE VISIT (OUTPATIENT)
Dept: ONCOLOGY | Facility: CLINIC | Age: 62
End: 2022-03-16

## 2022-03-16 VITALS
BODY MASS INDEX: 45.99 KG/M2 | HEART RATE: 62 BPM | OXYGEN SATURATION: 99 % | SYSTOLIC BLOOD PRESSURE: 126 MMHG | RESPIRATION RATE: 18 BRPM | TEMPERATURE: 98.6 F | DIASTOLIC BLOOD PRESSURE: 54 MMHG | WEIGHT: 293 LBS | HEIGHT: 67 IN

## 2022-03-16 DIAGNOSIS — C50.919 METASTATIC BREAST CANCER: ICD-10-CM

## 2022-03-16 DIAGNOSIS — C50.919 METASTATIC BREAST CANCER: Primary | ICD-10-CM

## 2022-03-16 LAB
ALBUMIN SERPL-MCNC: 3.4 G/DL (ref 3.5–5.2)
ALBUMIN/GLOB SERPL: 0.9 G/DL (ref 1.1–2.4)
ALP SERPL-CCNC: 76 U/L (ref 38–116)
ALT SERPL W P-5'-P-CCNC: 6 U/L (ref 0–33)
ANION GAP SERPL CALCULATED.3IONS-SCNC: 10.5 MMOL/L (ref 5–15)
AST SERPL-CCNC: 13 U/L (ref 0–32)
BASOPHILS # BLD AUTO: 0.06 10*3/MM3 (ref 0–0.2)
BASOPHILS NFR BLD AUTO: 2.2 % (ref 0–1.5)
BILIRUB SERPL-MCNC: 0.3 MG/DL (ref 0.2–1.2)
BUN SERPL-MCNC: 42 MG/DL (ref 6–20)
BUN/CREAT SERPL: 9.1 (ref 7.3–30)
CALCIUM SPEC-SCNC: 8.6 MG/DL (ref 8.5–10.2)
CHLORIDE SERPL-SCNC: 103 MMOL/L (ref 98–107)
CO2 SERPL-SCNC: 24.5 MMOL/L (ref 22–29)
CREAT SERPL-MCNC: 4.63 MG/DL (ref 0.6–1.1)
DEPRECATED RDW RBC AUTO: 74.5 FL (ref 37–54)
EGFRCR SERPLBLD CKD-EPI 2021: 10.1 ML/MIN/1.73
EOSINOPHIL # BLD AUTO: 0.1 10*3/MM3 (ref 0–0.4)
EOSINOPHIL NFR BLD AUTO: 3.6 % (ref 0.3–6.2)
ERYTHROCYTE [DISTWIDTH] IN BLOOD BY AUTOMATED COUNT: 20.1 % (ref 12.3–15.4)
GLOBULIN UR ELPH-MCNC: 3.8 GM/DL (ref 1.8–3.5)
GLUCOSE SERPL-MCNC: 138 MG/DL (ref 74–124)
HCT VFR BLD AUTO: 26.2 % (ref 34–46.6)
HGB BLD-MCNC: 8 G/DL (ref 12–15.9)
IMM GRANULOCYTES # BLD AUTO: 0.02 10*3/MM3 (ref 0–0.05)
IMM GRANULOCYTES NFR BLD AUTO: 0.7 % (ref 0–0.5)
LYMPHOCYTES # BLD AUTO: 0.7 10*3/MM3 (ref 0.7–3.1)
LYMPHOCYTES NFR BLD AUTO: 25.3 % (ref 19.6–45.3)
MCH RBC QN AUTO: 32 PG (ref 26.6–33)
MCHC RBC AUTO-ENTMCNC: 30.5 G/DL (ref 31.5–35.7)
MCV RBC AUTO: 104.8 FL (ref 79–97)
MONOCYTES # BLD AUTO: 0.25 10*3/MM3 (ref 0.1–0.9)
MONOCYTES NFR BLD AUTO: 9 % (ref 5–12)
NEUTROPHILS NFR BLD AUTO: 1.64 10*3/MM3 (ref 1.7–7)
NEUTROPHILS NFR BLD AUTO: 59.2 % (ref 42.7–76)
NRBC BLD AUTO-RTO: 0 /100 WBC (ref 0–0.2)
PLATELET # BLD AUTO: 258 10*3/MM3 (ref 140–450)
PMV BLD AUTO: 8.5 FL (ref 6–12)
POTASSIUM SERPL-SCNC: 4.6 MMOL/L (ref 3.5–4.7)
PROT SERPL-MCNC: 7.2 G/DL (ref 6.3–8)
RBC # BLD AUTO: 2.5 10*6/MM3 (ref 3.77–5.28)
SODIUM SERPL-SCNC: 138 MMOL/L (ref 134–145)
WBC NRBC COR # BLD: 2.77 10*3/MM3 (ref 3.4–10.8)

## 2022-03-16 PROCEDURE — 85025 COMPLETE CBC W/AUTO DIFF WBC: CPT

## 2022-03-16 PROCEDURE — 96402 CHEMO HORMON ANTINEOPL SQ/IM: CPT

## 2022-03-16 PROCEDURE — 99214 OFFICE O/P EST MOD 30 MIN: CPT | Performed by: INTERNAL MEDICINE

## 2022-03-16 PROCEDURE — 80053 COMPREHEN METABOLIC PANEL: CPT

## 2022-03-16 PROCEDURE — 25010000002 FULVESTRANT PER 25 MG: Performed by: INTERNAL MEDICINE

## 2022-03-16 PROCEDURE — 36415 COLL VENOUS BLD VENIPUNCTURE: CPT

## 2022-03-16 RX ADMIN — FULVESTRANT 500 MG: 250 INJECTION INTRAMUSCULAR at 08:30

## 2022-03-16 NOTE — PROGRESS NOTES
MTM Encounter-Re: Adherence and side effects (Ibrance + Faslodex)    Today's encounter was conducted in person, face-to-face.     Medication:  Ibrance 100 mg by mouth daily for 7 days on, 7 days off + Faslodex   - Reason for outreach: Routine medication check-in .  - Administration: Patient is taking every day at the same time .  - Missed doses: Patient reports missing 0 doses in the last 30 days.  - Self-administration: Patient demonstrates ability to self-administer medication. No barriers to adherence identified.   - Diagnosis/Indication: breast cancer. Progress toward achieving therapeutic goals reviewed.   - Patient denies side effects.    - Medication availability/affordability: Patient has had no issues obtaining medication from pharmacy.   - Questions/concerns about medications: n/a       Completed medication reconciliation today to assess for drug interactions.   Reviewed allergies, medical history, labs, quality of life, and medication history with patient.   Patient denies starting or stopping any medications.  Assessed medication list for interactions, no significant drug interactions noted.   Advised pt to call the clinic if any new medications are started so we can assess for drug-drug interactions.     All questions addressed. Patient had no additional concerns for MTM office.     Rosanna Serna, Pharmacist  3/16/2022  09:32 EDT

## 2022-03-30 ENCOUNTER — TELEPHONE (OUTPATIENT)
Dept: ONCOLOGY | Facility: CLINIC | Age: 62
End: 2022-03-30

## 2022-03-30 DIAGNOSIS — D63.1 ANEMIA DUE TO CHRONIC KIDNEY DISEASE, ON CHRONIC DIALYSIS: Primary | ICD-10-CM

## 2022-03-30 DIAGNOSIS — N18.6 ANEMIA DUE TO CHRONIC KIDNEY DISEASE, ON CHRONIC DIALYSIS: Primary | ICD-10-CM

## 2022-03-30 DIAGNOSIS — Z99.2 ANEMIA DUE TO CHRONIC KIDNEY DISEASE, ON CHRONIC DIALYSIS: Primary | ICD-10-CM

## 2022-03-30 RX ORDER — FUROSEMIDE 10 MG/ML
40 INJECTION INTRAMUSCULAR; INTRAVENOUS ONCE
Status: CANCELLED | OUTPATIENT
Start: 2022-03-30

## 2022-03-30 RX ORDER — SODIUM CHLORIDE 9 MG/ML
250 INJECTION, SOLUTION INTRAVENOUS AS NEEDED
Status: CANCELLED | OUTPATIENT
Start: 2022-03-30

## 2022-03-30 NOTE — TELEPHONE ENCOUNTER
Phoned patient regarding low Hgb of 6.6 from 3/28/22. Informed patient that Dr. Irvin would like patient to get blood transfusion. Patient scheduled for CBC/RN review in our office tomorrow. Discussed moving lab/RN review to earlier in the day to facilitate scheduling of blood transfusion. Patient v/u. Orders entered and scheduling notified.    Also phoned Dr. Tai Antonio, patient's nephrologist, per Dr. Irvin's recommendation. Dr. Antonio would like patient to receive 40 mg IV Lasix after each unit of transfused blood. Orders placed for Lasix per Dr. Antonio.

## 2022-03-31 ENCOUNTER — TELEPHONE (OUTPATIENT)
Dept: PSYCHIATRY | Facility: HOSPITAL | Age: 62
End: 2022-03-31

## 2022-03-31 ENCOUNTER — APPOINTMENT (OUTPATIENT)
Dept: ONCOLOGY | Facility: HOSPITAL | Age: 62
End: 2022-03-31

## 2022-03-31 ENCOUNTER — HOSPITAL ENCOUNTER (OUTPATIENT)
Dept: INFUSION THERAPY | Facility: HOSPITAL | Age: 62
Setting detail: INFUSION SERIES
Discharge: HOME | End: 2022-03-31

## 2022-03-31 ENCOUNTER — TELEPHONE (OUTPATIENT)
Dept: ONCOLOGY | Facility: CLINIC | Age: 62
End: 2022-03-31

## 2022-03-31 ENCOUNTER — APPOINTMENT (OUTPATIENT)
Dept: LAB | Facility: HOSPITAL | Age: 62
End: 2022-03-31

## 2022-03-31 VITALS
SYSTOLIC BLOOD PRESSURE: 137 MMHG | DIASTOLIC BLOOD PRESSURE: 59 MMHG | HEART RATE: 61 BPM | OXYGEN SATURATION: 99 % | RESPIRATION RATE: 20 BRPM | TEMPERATURE: 98 F

## 2022-03-31 DIAGNOSIS — D63.1 ANEMIA DUE TO CHRONIC KIDNEY DISEASE, ON CHRONIC DIALYSIS: ICD-10-CM

## 2022-03-31 DIAGNOSIS — C50.919 METASTATIC BREAST CANCER: ICD-10-CM

## 2022-03-31 DIAGNOSIS — N18.6 ANEMIA DUE TO CHRONIC KIDNEY DISEASE, ON CHRONIC DIALYSIS: ICD-10-CM

## 2022-03-31 DIAGNOSIS — Z99.2 ANEMIA DUE TO CHRONIC KIDNEY DISEASE, ON CHRONIC DIALYSIS: ICD-10-CM

## 2022-03-31 LAB
ABO GROUP BLD: NORMAL
BASOPHILS # BLD MANUAL: 0.06 10*3/MM3 (ref 0–0.2)
BASOPHILS NFR BLD MANUAL: 2.2 % (ref 0–1.5)
BLD GP AB SCN SERPL QL: NEGATIVE
DACRYOCYTES BLD QL SMEAR: ABNORMAL
DEPRECATED RDW RBC AUTO: 64.7 FL (ref 37–54)
EOSINOPHIL # BLD MANUAL: 0.09 10*3/MM3 (ref 0–0.4)
EOSINOPHIL NFR BLD MANUAL: 3.3 % (ref 0.3–6.2)
ERYTHROCYTE [DISTWIDTH] IN BLOOD BY AUTOMATED COUNT: 17.8 % (ref 12.3–15.4)
HCT VFR BLD AUTO: 19.9 % (ref 34–46.6)
HGB BLD-MCNC: 6.6 G/DL (ref 12–15.9)
HYPOCHROMIA BLD QL: ABNORMAL
LYMPHOCYTES # BLD MANUAL: 0.23 10*3/MM3 (ref 0.7–3.1)
LYMPHOCYTES NFR BLD MANUAL: 5.5 % (ref 5–12)
MCH RBC QN AUTO: 33.2 PG (ref 26.6–33)
MCHC RBC AUTO-ENTMCNC: 33.2 G/DL (ref 31.5–35.7)
MCV RBC AUTO: 100 FL (ref 79–97)
MONOCYTES # BLD: 0.15 10*3/MM3 (ref 0.1–0.9)
NEUTROPHILS # BLD AUTO: 2.13 10*3/MM3 (ref 1.7–7)
NEUTROPHILS NFR BLD MANUAL: 80.2 % (ref 42.7–76)
NRBC SPEC MANUAL: 2.2 /100 WBC (ref 0–0.2)
PLAT MORPH BLD: NORMAL
PLATELET # BLD AUTO: 198 10*3/MM3 (ref 140–450)
PMV BLD AUTO: 9.7 FL (ref 6–12)
RBC # BLD AUTO: 1.99 10*6/MM3 (ref 3.77–5.28)
RH BLD: POSITIVE
T&S EXPIRATION DATE: NORMAL
VARIANT LYMPHS NFR BLD MANUAL: 8.8 % (ref 19.6–45.3)
WBC MORPH BLD: NORMAL
WBC NRBC COR # BLD: 2.65 10*3/MM3 (ref 3.4–10.8)

## 2022-03-31 PROCEDURE — 85025 COMPLETE CBC W/AUTO DIFF WBC: CPT | Performed by: INTERNAL MEDICINE

## 2022-03-31 PROCEDURE — 86850 RBC ANTIBODY SCREEN: CPT | Performed by: INTERNAL MEDICINE

## 2022-03-31 PROCEDURE — 86901 BLOOD TYPING SEROLOGIC RH(D): CPT | Performed by: INTERNAL MEDICINE

## 2022-03-31 PROCEDURE — 36415 COLL VENOUS BLD VENIPUNCTURE: CPT

## 2022-03-31 PROCEDURE — 96376 TX/PRO/DX INJ SAME DRUG ADON: CPT

## 2022-03-31 PROCEDURE — 96374 THER/PROPH/DIAG INJ IV PUSH: CPT

## 2022-03-31 PROCEDURE — 36430 TRANSFUSION BLD/BLD COMPNT: CPT

## 2022-03-31 PROCEDURE — 86900 BLOOD TYPING SEROLOGIC ABO: CPT

## 2022-03-31 PROCEDURE — P9016 RBC LEUKOCYTES REDUCED: HCPCS

## 2022-03-31 PROCEDURE — 25010000002 FUROSEMIDE PER 20 MG: Performed by: INTERNAL MEDICINE

## 2022-03-31 PROCEDURE — 86900 BLOOD TYPING SEROLOGIC ABO: CPT | Performed by: INTERNAL MEDICINE

## 2022-03-31 PROCEDURE — 86923 COMPATIBILITY TEST ELECTRIC: CPT

## 2022-03-31 PROCEDURE — 85007 BL SMEAR W/DIFF WBC COUNT: CPT | Performed by: INTERNAL MEDICINE

## 2022-03-31 RX ORDER — SODIUM CHLORIDE 9 MG/ML
250 INJECTION, SOLUTION INTRAVENOUS AS NEEDED
Status: DISCONTINUED | OUTPATIENT
Start: 2022-03-31 | End: 2022-04-02 | Stop reason: HOSPADM

## 2022-03-31 RX ORDER — FUROSEMIDE 10 MG/ML
40 INJECTION INTRAMUSCULAR; INTRAVENOUS ONCE
Status: COMPLETED | OUTPATIENT
Start: 2022-03-31 | End: 2022-03-31

## 2022-03-31 RX ADMIN — FUROSEMIDE 40 MG: 10 INJECTION, SOLUTION INTRAVENOUS at 12:05

## 2022-03-31 RX ADMIN — FUROSEMIDE 40 MG: 10 INJECTION, SOLUTION INTRAVENOUS at 15:01

## 2022-03-31 NOTE — TELEPHONE ENCOUNTER
[facility]    Supportive call placed to patient regarding need to reschedule apt in clinic; LM with number to clinic and request for return call.

## 2022-03-31 NOTE — TELEPHONE ENCOUNTER
----- Message from Vonnie Patrick RN sent at 3/30/2022  4:14 PM EDT -----  Patient is scheduled for CBC/RN review tomorrow afternoon. However her labs from nephrology on Monday indicated severe anemia, and Dr. Irvin wants to get her set up for a blood transfusion in [facility] tomorrow. Could someone move her labs/RN review up to tomorrow morning and then schedule her for 2 units in [facility] to follow? Orders placed. On the lab appointment line, please indicate we need to draw a type & screen in addition to CBC.    Thanks!

## 2022-04-14 ENCOUNTER — HOSPITAL ENCOUNTER (OUTPATIENT)
Dept: PET IMAGING | Facility: HOSPITAL | Age: 62
Discharge: HOME | End: 2022-04-14

## 2022-04-14 ENCOUNTER — APPOINTMENT (OUTPATIENT)
Dept: PET IMAGING | Facility: HOSPITAL | Age: 62
End: 2022-04-14

## 2022-04-14 ENCOUNTER — OFFICE VISIT (OUTPATIENT)
Dept: PSYCHIATRY | Facility: HOSPITAL | Age: 62
End: 2022-04-14

## 2022-04-14 DIAGNOSIS — R53.83 FATIGUE, UNSPECIFIED TYPE: ICD-10-CM

## 2022-04-14 DIAGNOSIS — G47.33 OSA (OBSTRUCTIVE SLEEP APNEA): ICD-10-CM

## 2022-04-14 DIAGNOSIS — F32.A ANXIETY AND DEPRESSION: Primary | ICD-10-CM

## 2022-04-14 DIAGNOSIS — F41.9 ANXIETY AND DEPRESSION: Primary | ICD-10-CM

## 2022-04-14 PROCEDURE — 99214 OFFICE O/P EST MOD 30 MIN: CPT | Performed by: NURSE PRACTITIONER

## 2022-04-14 RX ORDER — ARMODAFINIL 250 MG/1
250 TABLET ORAL DAILY
Qty: 30 TABLET | Refills: 0 | Status: SHIPPED | OUTPATIENT
Start: 2022-04-14 | End: 2022-06-01

## 2022-04-14 NOTE — PROGRESS NOTES
[facility]  In Person Session    Subjective  Patient ID: Juana Hall is a 61 y.o. female is seen face to face in the [facility].    CC: Complicated grief    HPI: Pt reports significant improvement in sleep with addition of low dose atypical, reporting improved continuity up to 5-6 hours. Does report sleep as being somewhat delayed, and feels tired in am. Perceives mood and anxiety sx as being stable to improved, although continues to endorse limited desire to engage, limited robyn while engaging in various activities. Has not yet returned to crafting, atlhough recognizes this as a goal. Energy remains low; historically partially responsive to armodafinil, although discontinued with limited benefit at 100 mg daily.    Reports continues grief surrounding loss of son, although identifies healing over time and feels better able to participate in life. Identifies gratitude and robyn in his remembrance. Physically, pt reports feeling well. Continues on dialysis two times weekly. Appreciaes adjusted time in terms of the tolerability of this. Continues to see this as burdensome and mentally tortuous, although her choice.   Appreciates trust in medical team. Does report some cramping associated with this, contributing to vomiting, responsive to phernergan. Anticipated PET scan today, delayed until next week due to insurance.    Objective   Mental Status Exam  Appearance:  clean and casually dressed, appropriate  Attitude toward clinician:  cooperative and agreeable   Speech:    Rate:  regular rate and rhythm   Volume:  normal  Motor:  no abnormal movements present  Mood:  Improving  Affect:  euthymic and mood congruent  Thought Processes:  linear, logical, and goal directed  Thought Content:  normal  Suicidal Thoughts:  absent  Homicidal Thoughts:  absent  Perceptual Disturbance: no perceptual disturbance  Attention and Concentration:  good  Insight and Judgement:   good  Memory:  memory appears to be intact    Review of Systems   Constitutional: Positive for fatigue.   Psychiatric/Behavioral: Positive for dysphoric mood and sleep disturbance.     Medications Reviewed:  seroquel 25 mg q hs  zoloft 150 mg daily    Diagnoses and all orders for this visit:    1. Anxiety and depression (Primary)    2. Fatigue, unspecified type  -     Armodafinil 250 MG tablet; Take 1 tablet by mouth Daily for 30 days.  Dispense: 30 tablet; Refill: 0    3. ANDREW (obstructive sleep apnea)  -     Armodafinil 250 MG tablet; Take 1 tablet by mouth Daily for 30 days.  Dispense: 30 tablet; Refill: 0    Plan of Care  Continue zoloft.  Move seroquel earlier in the evening to assist with delayed onset, am sedation.  Increase amodafinil to 125-250 mg daily.  Supported pt in scheduled activities, current level of activation.  Follow up in six weeks.    I spent 35 minutes caring for Juana on this date of service. This time includes time spent by me in the following activities: preparing for the visit, obtaining and/or reviewing a separately obtained history, performing a medically appropriate examination and/or evaluation, counseling and educating the patient/family/caregiver, ordering medications, tests, or procedures and documenting information in the medical record.

## 2022-04-19 ENCOUNTER — HOSPITAL ENCOUNTER (OUTPATIENT)
Dept: PET IMAGING | Facility: HOSPITAL | Age: 62
Discharge: HOME | End: 2022-04-19

## 2022-04-19 DIAGNOSIS — C50.919 METASTATIC BREAST CANCER: ICD-10-CM

## 2022-04-19 LAB — GLUCOSE BLDC GLUCOMTR-MCNC: 94 MG/DL (ref 70–130)

## 2022-04-19 PROCEDURE — 0 FLUDEOXYGLUCOSE F18 SOLUTION: Performed by: INTERNAL MEDICINE

## 2022-04-19 PROCEDURE — 78815 PET IMAGE W/CT SKULL-THIGH: CPT

## 2022-04-19 PROCEDURE — 82962 GLUCOSE BLOOD TEST: CPT

## 2022-04-19 PROCEDURE — A9552 F18 FDG: HCPCS | Performed by: INTERNAL MEDICINE

## 2022-04-19 RX ADMIN — FLUDEOXYGLUCOSE F18 1 DOSE: 300 INJECTION INTRAVENOUS at 09:57

## 2022-04-20 NOTE — PROGRESS NOTES
"Chief Complaint  Metastatic lobular breast cancer (ER/AR positive, HER-2/tj negative), anemia secondary to CKD3, CHF, peripheral neuropathy, chemotherapy related nausea chemotherapy-induced myelosuppression    Subjective        History of Present Illness  The patient returns today in follow-up continuing currently on Faslodex and Ibrance.  She he is due today for cycle 6 day 1 Faslodex with laboratory studies and PET scan to review today.  She is receiving Ibrance at a dose of 100 mg daily for 7 days on followed by 7 days off.  She just started a week of Ibrance on 4/20/2022.  Patient continues on hemodialysis on Mondays and Fridays and does receive Procrit 20,000 units each day.  She has significant limitation in her mobility, does typically use a motorized scooter which she is in again today.  She continues to tolerate treatment well.  She does have anemia which is felt to be exacerbated by Ibrance but ultimately related to her ESRD.  She required transfusion support 2 unit PRBC on 3/31/2022 with hemoglobin of 6.6.  She has had no evidence of GI blood loss.  She is mildly symptomatic from her anemia with fatigue.  Surprisingly she is not very fatigued today with hemoglobin of 6.4.  She is very tired of all of her medical appointments and does struggle with depression regarding these issues.  She does have some underlying chronic peripheral neuropathy related to previous chemotherapy involving her hands and feet however there is no effect on her dexterity nor her balance.       Objective   Vital Signs:   /63   Pulse 66   Temp 97.3 °F (36.3 °C) (Temporal)   Resp 18   Ht 170.2 cm (67.01\")   Wt (!) 158 kg (349 lb) Comment: states unable to stand for wt  SpO2 94%   BMI 54.65 kg/m²     Physical Exam  Constitutional:       Appearance: She is well-developed. She is obese.      Comments: Patient is in a motorized scooter today   Eyes:      Conjunctiva/sclera: Conjunctivae normal.   Neck:      Thyroid: No " thyromegaly.   Cardiovascular:      Rate and Rhythm: Normal rate and regular rhythm.      Heart sounds: Murmur heard.     No friction rub. No gallop.      Comments: 2/6 murmur  Pulmonary:      Effort: No respiratory distress.      Breath sounds: Normal breath sounds.      Comments: Dialysis access in place in the right subclavian position  Chest:   Breasts:      Right: No supraclavicular adenopathy.      Left: No supraclavicular adenopathy.       Abdominal:      General: Bowel sounds are normal. There is no distension.      Palpations: Abdomen is soft.      Tenderness: There is no abdominal tenderness.   Musculoskeletal:         General: Swelling present.      Comments: 1+ bilateral lower extremity edema with venous stasis changes noted.   Lymphadenopathy:      Head:      Right side of head: No submandibular adenopathy.      Cervical: No cervical adenopathy.      Upper Body:      Right upper body: No supraclavicular adenopathy.      Left upper body: No supraclavicular adenopathy.   Skin:     General: Skin is warm and dry.      Findings: No bruising or rash.   Neurological:      Mental Status: She is alert and oriented to person, place, and time.      Cranial Nerves: No cranial nerve deficit.      Motor: No abnormal muscle tone.      Deep Tendon Reflexes: Reflexes normal.   Psychiatric:         Behavior: Behavior normal.        Patient was examined today, unchanged from above    Result Review : Reviewed CBC, CMP from today.  Reviewed PET scan from 4/19/2022.  I did personally review PET scan images with interpretation as outlined in the assessment below.  I reviewed PET scan images in the exam room with the patient and her  and discussed the PET scan findings on the telephone with Dr. Conde in radiology.       Assessment and Plan    *Metastatic lobular breast cancer (ER/[place] positive, HER-2/tj negative):  · Previous stage IIIC right breast cancer in 2003, received neoadjuvant chemotherapy (unknown regimen)  with subsequent bilateral mastectomies 1/22/2004 with right axillary dissection.  Residual 10-12 cm invasive lobular carcinoma on the right breast with 14/16+ nodes with extranodal extension.  Received adjuvant carboplatin and Navelbine chemotherapy x4 cycles  · Attempted adjuvant radiation to the chest wall however interrupted due to cellulitis that required removal of implants in August 2004  · Received tamoxifen from 6/22/2004 until June 2009.  Transitioned to Femara and received until June 2014  · Identification of CT findings concerning for carcinomatosis on CT scans and June 2019.  · PET scan confirmed hypermetabolic activity in the omentum as well as small retroperitoneal lymph nodes.  · CT-guided omental biopsy 7/30/2019 with metastatic lobular carcinoma of breast primary, ER positive (greater than 95%), VT positive (90%), HER-2/tj negative (1+ IHC)  · [facility] liquid biopsy 2/5/2020: MSI undetermined, mutations in BETH, NF1, CHEK2.  No evidence of PIK3CA mutation.  · Subsequent [facility] germline testing 11/12/2021 with BETH VUS (c.2864C>A) and POLE VUS (c.631A>T)  · Initiation of Aromasin and Ibrance in August 2019  · Difficulty with myelosuppression related to Ibrance requiring dose alterations, eventually changed to 100 mg for 7 days on followed by 7 days off.  · CT chest abdomen pelvis 10/26/2021 with increase in left hydronephrosis with increase in left perinephric and periureteral stranding. Decrease in stone in the left kidney lower pole (7 mm).  Stable small retroperitoneal lymph and left periaortic lymph nodes.  · PET scan 11/10/2021 with multiple bilateral hypermetabolic nodular pulmonary lesions, asymmetric moderate to severe left hydronephrosis with ill-defined fat stranding and soft tissue thickening in the renal sinus and surrounding the left ureter, hypermetabolic left retroperitoneal lymph node with slight increase in size, ill-defined hypermetabolic thickening in the inferior  omentum with increase in size, uptake and C7 (indeterminate, recommended MRI).  Short segments of uptake in the mid and distal esophagus possibly related to gastritis.  · PET scan findings felt to be consistent with progressive malignancy.  Patient declined repeat omental biopsy.   · Options for further treatment discussed on 11/12/2021.  In light of renal failure and dialysis, options are more limited.  Recommendation to continue Ibrance and discontinue Aromasin, begin Faslodex.  Discussed possible future use of single agent Taxol.    · Aromasin discontinued 11/12/2021  · On 11/22/2021 initiation of Faslodex with continuation of Ibrance (100 mg daily for 7 days on followed by 7 days off).  · MRI cervical spine 11/18/2021 showed the right C7 normality to likely represent metastatic disease.  With solitary bone lesion, did not recommend pursuit of bone modifying agent.  · Following 2 cycles Faslodex/Ibrance, PET scan on 1/11/2022 showed no change in the soft tissue superior to the dome of the bladder with hypermetabolic activity (likely related to carcinomatosis).  Significant decrease in injection of fat around the left renal pelvis and stable retroperitoneal hypermetabolic lymph nodes, decrease in size and activity in small bilateral pulmonary nodules.  Findings felt to represent evidence of response to treatment.    · Ibrance held briefly beginning 1/28/2022 due to hospitalization with COVID-19 infection and C. difficile colitis.  Patient developed leukopenia/neutropenia.  Ibrance resumed at previous dosing (100 mg daily 7 days on followed by 7 days off) on 2/9/22.  · Following 5 cycles Faslodex/Ibrance PET scan 4/19/2022 with evidence of mixed response.  Due to hypermetabolic lesion spinous process L2 (SUV 5.1) and slight increase in activity left clavicular metastasis (SUV increased 4.6-8.1).  Decrease in uptake involving omental thickening.  Stable soft tissue thickening around the left renal pelvis and ureter.   Discussion again regarding potential pursuit of IV chemotherapy with Taxol versus continuation of Ibrance and Faslodex with radiation to L2 spinous process (solitary new site of bony metastasis).  Patient opted to defer initiation of systemic chemotherapy and continue Ibrance/Faslodex with consideration of radiation to L2 lesion.  · The patient returns today in follow-up continuing on Faslodex and Ibrance.  She is due for cycle 6-day 1 Faslodex 500 mg IM today.  She continues on Ibrance 100 mg daily 7 days on followed by 7 days off.  She does tolerate treatment well with no significant side effects.  She does however have severe anemia which is exacerbated by Ibrance (see below) with hemoglobin today down to 6.4.  She will require transfusion support which we will arrange on 4/23/2022 (tomorrow's dialysis today).  We had a lengthy discussion today regarding her PET scan findings and further management of her disease.  I reviewed the PET scan findings at length today in the computer and discussed the findings with Dr. Conde in radiology on the telephone today.  She has truly experienced a mixed response with the disease around the left kidney and ureter remaining stable, some slight decrease in activity in the omental involvement however there is at least 1 new site of bony metastasis in the spinous process of L2.  The significance of the increase in activity only in the left clavicle lesion is unclear.  She remains asymptomatic from her bony disease.  We discussed to transition to single agent IV Taxol (with requirement for Mediport placement) versus continuation of Faslodex and Ibrance and consideration of radiation to the solitary site of bony progression at L2.  I believe that the patient will require transition to Taxol at some point in the future however she would prefer to delay this is much as possible given the potential for increased side effects.  This does seem reasonable.  We will therefore proceed on  with cycle 6 Faslodex today.  She initiated her week of Ibrance yesterday.  We will refer her to radiation oncology to discuss potential radiation to the L2 metastasis (currently asymptomatic from this however).  I did suggest that we obtain a baseline bone scan and also MRI of the lumbar spine.  We will check repeat CA 15-3 today.  Consider PET scan at 2 to 3-month interval.  We did discuss as well potential use of bone modifying agent with progression of bony metastatic disease.  Given her underlying renal disease she would only be a candidate for Xgeva and I did contact Dr. Antonio today who did not feel that there would be a contraindication to using Xgeva in light of her ongoing dialysis.  We will potentially begin monthly Xgeva when she returns here in 4 weeks.    *Anemia secondary to ESRD, exacerbated by Ibrance:  · Anemia secondary to ESRD, malignancy with exacerbation by use of Ibrance  · Previous evidence of folate deficiency 8/12/2019 with level 3.84, initiated folic acid 1 mg daily  · Previous evidence of iron deficiency, received Injectafer on 8/7/2020 750 mg IV x1 dose.  Second dose not administered due to onset of fever, subsequent iron studies precluded further administration of IV iron.  · Previous low normal B12 level initiated oral B12 1000 mcg daily.  · Patient had previously received Procrit and required intermittent transfusion support  · Following initiation of hemodialysis, management of BEAU transitioned to nephrology, receiving Epogen with dialysis.  · Ongoing transfusion support prompted alteration in Ibrance dosing on 8/23/2021.  · After alteration in Ibrance dosing, hemoglobin improved and remained stable in the 9-10 range.  · Improved but ongoing intermittent need for transfusion support despite Ibrance dose alteration  · Stool negative for occult blood x3 on 11/2/2021  · Patient with reduced dialysis frequency to 2 days/week with concurrent decrease in Epogen dosing corresponding again  to increased transfusion requirement.  · Epogen dose increased per nephrology to 20,000 units twice weekly with dialysis on Mondays and Fridays  · Ibrance again exacerbating anemia with ongoing intermittent transfusion requirements  · Hemoglobin on 3/31/2022 6.6 with 2 unit PRBC transfusion performed on 4/1/2022  · Hemoglobin today 6.4.  Tomorrow is her dialysis day and we will arrange for 2 unit PRBC transfusion on 4/23/2022.  In 2 weeks recheck CBC with nurse practitioner visit.  I will see her in 4 weeks for follow-up with again repeat CBC.    *ESRD on HD:  · Patient with previous CKD3/4  · Hospitalization 4/29-5/4/2021 with RYAN/CKD, UTI, anemia.  Required initiation of hemodialysis Tuesday Thursday Saturday.   · Decrease in frequency of dialysis to twice per week.  She continues to produce a significant amount of urine.    · Status post left upper extremity AV fistula placement on 11/3/2021 by vascular surgery  · Attempt to hold further dialysis on 1/11/2022 with close monitoring of her laboratory and clinical parameters.  Dialysis quickly resumed due to development of hyperkalemia.  · Patient continues on dialysis 2 days/week on Mondays and Fridays.  Per patient report potential future consideration to hold dialysis again in the future.  Patient is followed closely by Dr. Antonio.      *CHF with mild to moderate aortic stenosis:  · Echocardiogram 7/12/2019 showing ejection fraction 48% with moderate dilation of the LV cavity, global hypokinesis, grade 2 diastolic dysfunction, mild to moderate aortic stenosis, trivial pericardial effusion.  · Echocardiogram 7/19/2021 with ejection fraction 56%, left atrial volume moderately increased, grade 2 diastolic dysfunction, severe calcification aortic valve, moderate aortic stenosis, severe mitral calcification, mild mitral stenosis, marked elevation in RVSP from tricuspid regurgitation.  · Patient notes that she discussed echo results with cardiology and valvular status  was felt to be stable in regards to aortic stenosis.    *Left upper and bilateral lower extremity peripheral neuropathy:  · Patient reports that left upper extremity neuropathy was not present from previous chemotherapy but occurred after hospitalization that required intubation and critical care stay.  Apparently felt to represent critical care neuropathy.  Improved over time.  · Bilateral lower extremity neuropathy felt to be related to diabetes  · May have future implications regarding treatment for breast cancer.  · Patient reports that peripheral neuropathy symptoms continue in the bilateral lower extremities, unchanged.  This will be a consideration with potential future use of Taxol    *Uterine/endometrial activity on PET scan:  · Patient experienced postmenopausal vaginal bleeding in 2013, negative endometrial biopsy and gynecologic evaluation.  · With findings on PET scan with enlarging uterus and some hypermetabolic activity, referred back to Dr. Oliveros in gynecology.    · 9/19/2019 D&C with hysteroscopy by Dr. Oliveros, no significant intraoperative findings, pathologic results with benign findings, no evidence of malignancy.    *Nausea:  · Mild, relieved with Zofran.   · Patient experienced improvement in nausea after initiating hemodialysis  · No recent nausea    *Myelosuppression secondary to Ibrance:  · WBC during hospitalization 1/28-1/30/2022 declined into the 1.9-2.0 range due to concurrent COVID-19 infection.  Ibrance was held briefly.  · Ibrance resumed on 2/9/2022 with WBC up to 4.49, ANC 3.07  · Today, WBC 3.73, ANC 2.18.    *Depression  · Patient with history of depression, worsened after the death of her son in November 2021  · With evidence of progressive malignancy in November 2021, patient agreeable to referral to supportive oncology  · Patient was seen in supportive oncology 11/18/2021, Zoloft increased from 50 up to 100 mg daily.  Prescribed armodafinil 50 mg daily however there or issues  with insurance coverage  · Patient does continue with difficulties regarding depression    *Left perinephric stranding secondary to malignancy with hydronephrosis:  · CT 4/22/2021 showed interval enlargement of 2 staghorn calculi in the left kidney with persistent left perinephric stranding  · Hospitalization 4/29-5/4/2021 with RYAN/CKD, UTI, anemia.  Required 2 unit PRBC transfusion and initiated hemodialysis Tuesday Thursday Saturday.    · Discussion from urology regarding potential need for surgical procedure to address staghorn calculus left kidney  · CT scan 7/2/2021 with only 1 remaining left renal stone identified which had decreased in size.  Patient appears to have passed the other stone.  · As above, CT 10/26/2021 with more prominent left hydronephrosis and increase in left perinephric and periureteral stranding.  Felt to possibly be related to passage of recent stone versus partial obstruction secondary to debris or thrombus in the left ureter versus pyelonephritis.  Patient however with no clinical evidence of recent stone passage nor pyelonephritis. Malignancy felt to be the cause of CT changes around the left kidney at this point.   · Left ureteral stent replacement 12/16/2021  · PET scan 1/11/2022 with decrease in fat injection near left renal pelvis, stent in satisfactory position.  Mild residual hydronephrosis on the left.  · Ureteral stent replaced on 3/10/2022    *Right thyroid nodules  · Patient underwent prior thyroid biopsy by her report with benign findings many years ago, records not available  · On MRI cervical spine 11/18/2021, finding of 1.8 cm right thyroid nodule  · Thyroid ultrasound 11/26/2021 with right-sided thyroid nodules, 1 nodule was hypoechoic, solid measuring 2 cm with biopsy recommended.  · Thyroid ultrasound results reviewed with patient.  In light of her metastatic breast cancer, renal failure the significance of the thyroid nodule is felt to be much less.  We discussed  possibility of thyroid biopsy however patient is inclined to forego this, especially since she has had a biopsy performed in the past with benign findings by her report.    · No hypermetabolic activity identified on PET scan 1/11/2022 in the neck    *Hospitalization 1/28-1/30/2022 due to COVID-19 infection and C. difficile colitis  · Patient fully vaccinated with 3 doses Moderna COVID-19 vaccine  · Patient developed symptoms 1/26/2022 with abrupt onset sinus congestion, mild cough, subsequently developed nausea and diarrhea on 1/27/2022.  Significant fatigue.  · Patient tested positive in emergency department on 1/28/2022 and was admitted  · Patient maintained O2 saturation in mid 90% range on room air  · Patient received remdesivir  · Patient was also found to be positive for C. difficile colitis, received course of oral vancomycin.  · Today, patient returns reporting that symptoms from her COVID-19 infection and C. difficile colitis have largely resolved.  She reports minimal residual cough and congestion, no further diarrhea.    Plan:  1. Proceed with cycle 6 -day 1 Faslodex 500 mg IM injection today  2. Continue Ibrance 100 mg daily for 7 days on followed by 7 days off (patient initiated a week of Ibrance on 4/20/2022).  3. Transfuse 2 unit PRBC within the next 1 to 2 days (patient will have dialysis tomorrow which will preclude transfusion most likely)  4. Continue oral folic acid 1 mg daily, B12 1000 mcg daily.  5. The patient is continuing on hemodialysis every Monday and Friday and is receiving Epogen 20,000 units with each dialysis per nephrology.  Patient is also receiving IV iron every Monday with dialysis.  6. Patient is aware that if she has an attempted break from hemodialysis she would need to continue on BEAU at least weekly either with nephrology or through our office with Procrit.  7. I will inquire with Dr. Antonio whether the patient may be considered for use of Xgeva in light of her ESRD on  HD  8. We will obtain a baseline bone scan and MRI lumbar spine  9. Referral to radiation oncology to consider radiation to site of progressive/new bony metastasis at L2 spinous process (see above discussion)  10. Add CA 15-3 to today's labs  11. In 2 weeks nurse practitioner visit with CBC, CMP  12. In 4 weeks MD visit with CBC, CMP.  Patient will be due for cycle 7-day 1 Faslodex.  We will discuss follow-up PET scan at a 2-month interval.  If there is further evidence of progression we will need to consider moving to palliative single agent IV Taxol    Patient continues on high risk medication requiring intensive monitoring.    I did spend 55 minutes in total time caring for the patient today, time spent in review of records, face-to-face consultation, coordination of care, placement of orders, completion of documentation.    Addendum: Dr. Antonio felt that administration of Xgeva would be acceptable in the setting of her ongoing dialysis.  We will schedule her to begin Xgeva when she returns in 4 weeks and is due again for Faslodex.

## 2022-04-21 ENCOUNTER — LAB (OUTPATIENT)
Dept: LAB | Facility: HOSPITAL | Age: 62
End: 2022-04-21

## 2022-04-21 ENCOUNTER — OFFICE VISIT (OUTPATIENT)
Dept: ONCOLOGY | Facility: CLINIC | Age: 62
End: 2022-04-21

## 2022-04-21 ENCOUNTER — DOCUMENTATION (OUTPATIENT)
Dept: ONCOLOGY | Facility: CLINIC | Age: 62
End: 2022-04-21

## 2022-04-21 ENCOUNTER — INFUSION (OUTPATIENT)
Dept: ONCOLOGY | Facility: HOSPITAL | Age: 62
End: 2022-04-21

## 2022-04-21 VITALS
OXYGEN SATURATION: 94 % | WEIGHT: 293 LBS | DIASTOLIC BLOOD PRESSURE: 63 MMHG | SYSTOLIC BLOOD PRESSURE: 103 MMHG | HEIGHT: 67 IN | RESPIRATION RATE: 18 BRPM | HEART RATE: 66 BPM | BODY MASS INDEX: 45.99 KG/M2 | TEMPERATURE: 97.3 F

## 2022-04-21 DIAGNOSIS — C50.919 METASTATIC BREAST CANCER: ICD-10-CM

## 2022-04-21 DIAGNOSIS — Z99.2 ANEMIA DUE TO CHRONIC KIDNEY DISEASE, ON CHRONIC DIALYSIS: ICD-10-CM

## 2022-04-21 DIAGNOSIS — N18.6 ANEMIA DUE TO CHRONIC KIDNEY DISEASE, ON CHRONIC DIALYSIS: ICD-10-CM

## 2022-04-21 DIAGNOSIS — C50.919 METASTATIC BREAST CANCER: Primary | ICD-10-CM

## 2022-04-21 DIAGNOSIS — D63.1 ANEMIA DUE TO CHRONIC KIDNEY DISEASE, ON CHRONIC DIALYSIS: ICD-10-CM

## 2022-04-21 LAB
ABO GROUP BLD: NORMAL
ALBUMIN SERPL-MCNC: 3.5 G/DL (ref 3.5–5.2)
ALBUMIN/GLOB SERPL: 1.1 G/DL (ref 1.1–2.4)
ALP SERPL-CCNC: 71 U/L (ref 38–116)
ALT SERPL W P-5'-P-CCNC: 13 U/L (ref 0–33)
ANION GAP SERPL CALCULATED.3IONS-SCNC: 10.9 MMOL/L (ref 5–15)
AST SERPL-CCNC: 15 U/L (ref 0–32)
BASOPHILS # BLD AUTO: 0.07 10*3/MM3 (ref 0–0.2)
BASOPHILS NFR BLD AUTO: 1.9 % (ref 0–1.5)
BILIRUB SERPL-MCNC: 0.3 MG/DL (ref 0.2–1.2)
BLD GP AB SCN SERPL QL: NEGATIVE
BUN SERPL-MCNC: 52 MG/DL (ref 6–20)
BUN/CREAT SERPL: 11 (ref 7.3–30)
CALCIUM SPEC-SCNC: 8.7 MG/DL (ref 8.5–10.2)
CHLORIDE SERPL-SCNC: 101 MMOL/L (ref 98–107)
CO2 SERPL-SCNC: 24.1 MMOL/L (ref 22–29)
CREAT SERPL-MCNC: 4.73 MG/DL (ref 0.6–1.1)
DEPRECATED RDW RBC AUTO: 74.4 FL (ref 37–54)
EGFRCR SERPLBLD CKD-EPI 2021: 9.8 ML/MIN/1.73
EOSINOPHIL # BLD AUTO: 0.17 10*3/MM3 (ref 0–0.4)
EOSINOPHIL NFR BLD AUTO: 4.6 % (ref 0.3–6.2)
ERYTHROCYTE [DISTWIDTH] IN BLOOD BY AUTOMATED COUNT: 19.8 % (ref 12.3–15.4)
GLOBULIN UR ELPH-MCNC: 3.3 GM/DL (ref 1.8–3.5)
GLUCOSE SERPL-MCNC: 90 MG/DL (ref 74–124)
HCT VFR BLD AUTO: 20.3 % (ref 34–46.6)
HGB BLD-MCNC: 6.4 G/DL (ref 12–15.9)
IMM GRANULOCYTES # BLD AUTO: 0.03 10*3/MM3 (ref 0–0.05)
IMM GRANULOCYTES NFR BLD AUTO: 0.8 % (ref 0–0.5)
LYMPHOCYTES # BLD AUTO: 0.89 10*3/MM3 (ref 0.7–3.1)
LYMPHOCYTES NFR BLD AUTO: 23.9 % (ref 19.6–45.3)
MCH RBC QN AUTO: 33 PG (ref 26.6–33)
MCHC RBC AUTO-ENTMCNC: 31.5 G/DL (ref 31.5–35.7)
MCV RBC AUTO: 104.6 FL (ref 79–97)
MONOCYTES # BLD AUTO: 0.39 10*3/MM3 (ref 0.1–0.9)
MONOCYTES NFR BLD AUTO: 10.5 % (ref 5–12)
NEUTROPHILS NFR BLD AUTO: 2.18 10*3/MM3 (ref 1.7–7)
NEUTROPHILS NFR BLD AUTO: 58.3 % (ref 42.7–76)
NRBC BLD AUTO-RTO: 0.5 /100 WBC (ref 0–0.2)
PLATELET # BLD AUTO: 254 10*3/MM3 (ref 140–450)
PMV BLD AUTO: 8.3 FL (ref 6–12)
POTASSIUM SERPL-SCNC: 4.1 MMOL/L (ref 3.5–4.7)
PROT SERPL-MCNC: 6.8 G/DL (ref 6.3–8)
RBC # BLD AUTO: 1.94 10*6/MM3 (ref 3.77–5.28)
RH BLD: POSITIVE
SODIUM SERPL-SCNC: 136 MMOL/L (ref 134–145)
T&S EXPIRATION DATE: NORMAL
WBC NRBC COR # BLD: 3.73 10*3/MM3 (ref 3.4–10.8)

## 2022-04-21 PROCEDURE — 86901 BLOOD TYPING SEROLOGIC RH(D): CPT

## 2022-04-21 PROCEDURE — 86900 BLOOD TYPING SEROLOGIC ABO: CPT

## 2022-04-21 PROCEDURE — 36415 COLL VENOUS BLD VENIPUNCTURE: CPT | Performed by: INTERNAL MEDICINE

## 2022-04-21 PROCEDURE — 86850 RBC ANTIBODY SCREEN: CPT

## 2022-04-21 PROCEDURE — 86923 COMPATIBILITY TEST ELECTRIC: CPT

## 2022-04-21 PROCEDURE — 96402 CHEMO HORMON ANTINEOPL SQ/IM: CPT

## 2022-04-21 PROCEDURE — 86300 IMMUNOASSAY TUMOR CA 15-3: CPT | Performed by: INTERNAL MEDICINE

## 2022-04-21 PROCEDURE — 99215 OFFICE O/P EST HI 40 MIN: CPT | Performed by: INTERNAL MEDICINE

## 2022-04-21 PROCEDURE — 85025 COMPLETE CBC W/AUTO DIFF WBC: CPT

## 2022-04-21 PROCEDURE — 25010000002 FULVESTRANT PER 25 MG: Performed by: INTERNAL MEDICINE

## 2022-04-21 PROCEDURE — 80053 COMPREHEN METABOLIC PANEL: CPT

## 2022-04-21 RX ORDER — SODIUM CHLORIDE 9 MG/ML
250 INJECTION, SOLUTION INTRAVENOUS AS NEEDED
Status: CANCELLED | OUTPATIENT
Start: 2022-04-21

## 2022-04-21 RX ORDER — FUROSEMIDE 10 MG/ML
40 INJECTION INTRAMUSCULAR; INTRAVENOUS ONCE
Status: CANCELLED | OUTPATIENT
Start: 2022-04-21

## 2022-04-21 RX ORDER — FUROSEMIDE 10 MG/ML
40 INJECTION INTRAMUSCULAR; INTRAVENOUS ONCE
Status: CANCELLED | OUTPATIENT
Start: 2022-04-21 | End: 2022-04-21

## 2022-04-21 RX ADMIN — FULVESTRANT 500 MG: 250 INJECTION INTRAMUSCULAR at 09:46

## 2022-04-21 NOTE — PROGRESS NOTES
Phoned patient's nephrologist, Dr. Tai Antonio regarding patient's upcoming blood transfusion. Per Dr. Antonio, patient should receive 40 mg IV Lasix following each unit of transfused blood, and that this can be made a standing order each time patient undergoes a blood transfusion. R/v, and orders entered.

## 2022-04-22 ENCOUNTER — APPOINTMENT (OUTPATIENT)
Dept: PET IMAGING | Facility: HOSPITAL | Age: 62
End: 2022-04-22

## 2022-04-22 LAB — CANCER AG15-3 SERPL-ACNC: 25.5 U/ML

## 2022-04-23 ENCOUNTER — INFUSION (OUTPATIENT)
Dept: ONCOLOGY | Facility: HOSPITAL | Age: 62
End: 2022-04-23

## 2022-04-23 VITALS
SYSTOLIC BLOOD PRESSURE: 110 MMHG | DIASTOLIC BLOOD PRESSURE: 56 MMHG | OXYGEN SATURATION: 97 % | HEART RATE: 63 BPM | TEMPERATURE: 97.4 F | RESPIRATION RATE: 18 BRPM

## 2022-04-23 DIAGNOSIS — C50.919 METASTATIC BREAST CANCER: ICD-10-CM

## 2022-04-23 DIAGNOSIS — D63.1 ANEMIA DUE TO CHRONIC KIDNEY DISEASE, ON CHRONIC DIALYSIS: ICD-10-CM

## 2022-04-23 DIAGNOSIS — N18.6 ANEMIA DUE TO CHRONIC KIDNEY DISEASE, ON CHRONIC DIALYSIS: ICD-10-CM

## 2022-04-23 DIAGNOSIS — Z99.2 ANEMIA DUE TO CHRONIC KIDNEY DISEASE, ON CHRONIC DIALYSIS: ICD-10-CM

## 2022-04-23 PROCEDURE — P9016 RBC LEUKOCYTES REDUCED: HCPCS

## 2022-04-23 PROCEDURE — 25010000002 FUROSEMIDE PER 20 MG: Performed by: INTERNAL MEDICINE

## 2022-04-23 PROCEDURE — 86900 BLOOD TYPING SEROLOGIC ABO: CPT

## 2022-04-23 PROCEDURE — 36430 TRANSFUSION BLD/BLD COMPNT: CPT

## 2022-04-23 PROCEDURE — 96374 THER/PROPH/DIAG INJ IV PUSH: CPT

## 2022-04-23 RX ORDER — SODIUM CHLORIDE 9 MG/ML
250 INJECTION, SOLUTION INTRAVENOUS AS NEEDED
Status: DISCONTINUED | OUTPATIENT
Start: 2022-04-23 | End: 2022-04-23 | Stop reason: HOSPADM

## 2022-04-23 RX ORDER — FUROSEMIDE 10 MG/ML
40 INJECTION INTRAMUSCULAR; INTRAVENOUS ONCE
Status: COMPLETED | OUTPATIENT
Start: 2022-04-23 | End: 2022-04-23

## 2022-04-23 RX ORDER — FUROSEMIDE 10 MG/ML
40 INJECTION INTRAMUSCULAR; INTRAVENOUS ONCE
Status: DISCONTINUED | OUTPATIENT
Start: 2022-04-23 | End: 2022-04-23 | Stop reason: HOSPADM

## 2022-04-23 RX ADMIN — FUROSEMIDE 40 MG: 10 INJECTION, SOLUTION INTRAMUSCULAR; INTRAVENOUS at 11:41

## 2022-04-25 ENCOUNTER — TELEPHONE (OUTPATIENT)
Dept: PSYCHIATRY | Facility: HOSPITAL | Age: 62
End: 2022-04-25

## 2022-04-25 DIAGNOSIS — R53.83 FATIGUE, UNSPECIFIED TYPE: ICD-10-CM

## 2022-04-25 DIAGNOSIS — G47.33 OSA (OBSTRUCTIVE SLEEP APNEA): ICD-10-CM

## 2022-04-25 RX ORDER — QUETIAPINE FUMARATE 50 MG/1
50 TABLET, FILM COATED ORAL NIGHTLY
Qty: 90 TABLET | Refills: 0 | Status: SHIPPED | OUTPATIENT
Start: 2022-04-25 | End: 2022-06-30 | Stop reason: SDUPTHER

## 2022-04-25 NOTE — TELEPHONE ENCOUNTER
[facility]    Call placed to patient regarding recent staff message; reiterated instruction to increase current dose of armodafinil to 100 mg daily, new sig 1/2 tab (225 mg) q am, prior authorization being processed.  Will increase Seroquel to 50 mg tabs, directions to take 1 tab PO nightly.  Patient voices understanding.  Agrees to call out for questions or concerns.

## 2022-05-03 ENCOUNTER — HOSPITAL ENCOUNTER (OUTPATIENT)
Dept: NUCLEAR MEDICINE | Facility: HOSPITAL | Age: 62
Discharge: HOME | End: 2022-05-03

## 2022-05-03 ENCOUNTER — HOSPITAL ENCOUNTER (OUTPATIENT)
Dept: MRI IMAGING | Facility: HOSPITAL | Age: 62
Discharge: HOME | End: 2022-05-03
Admitting: INTERNAL MEDICINE

## 2022-05-03 DIAGNOSIS — C50.919 METASTATIC BREAST CANCER: ICD-10-CM

## 2022-05-03 PROCEDURE — 0 GADOBUTROL 1 MMOL/ML SOLUTION: Performed by: INTERNAL MEDICINE

## 2022-05-03 PROCEDURE — 78306 BONE IMAGING WHOLE BODY: CPT

## 2022-05-03 PROCEDURE — A9503 TC99M MEDRONATE: HCPCS | Performed by: INTERNAL MEDICINE

## 2022-05-03 PROCEDURE — 0 TECHNETIUM MEDRONATE KIT: Performed by: INTERNAL MEDICINE

## 2022-05-03 PROCEDURE — 72158 MRI LUMBAR SPINE W/O & W/DYE: CPT

## 2022-05-03 PROCEDURE — A9585 GADOBUTROL INJECTION: HCPCS | Performed by: INTERNAL MEDICINE

## 2022-05-03 RX ORDER — TC 99M MEDRONATE 20 MG/10ML
22.3 INJECTION, POWDER, LYOPHILIZED, FOR SOLUTION INTRAVENOUS
Status: COMPLETED | OUTPATIENT
Start: 2022-05-03 | End: 2022-05-03

## 2022-05-03 RX ADMIN — Medication 22.3 MILLICURIE: at 10:40

## 2022-05-03 RX ADMIN — GADOBUTROL 10 ML: 604.72 INJECTION INTRAVENOUS at 11:23

## 2022-05-05 ENCOUNTER — LAB (OUTPATIENT)
Dept: LAB | Facility: HOSPITAL | Age: 62
End: 2022-05-05

## 2022-05-05 ENCOUNTER — OFFICE VISIT (OUTPATIENT)
Dept: ONCOLOGY | Facility: CLINIC | Age: 62
End: 2022-05-05

## 2022-05-05 VITALS
HEIGHT: 67 IN | OXYGEN SATURATION: 96 % | RESPIRATION RATE: 16 BRPM | SYSTOLIC BLOOD PRESSURE: 101 MMHG | TEMPERATURE: 97.6 F | HEART RATE: 68 BPM | DIASTOLIC BLOOD PRESSURE: 62 MMHG | BODY MASS INDEX: 54.65 KG/M2

## 2022-05-05 DIAGNOSIS — N18.6 ESRD (END STAGE RENAL DISEASE): ICD-10-CM

## 2022-05-05 DIAGNOSIS — D61.810 PANCYTOPENIA DUE TO CHEMOTHERAPY: ICD-10-CM

## 2022-05-05 DIAGNOSIS — Z99.2 ANEMIA DUE TO CHRONIC KIDNEY DISEASE, ON CHRONIC DIALYSIS: ICD-10-CM

## 2022-05-05 DIAGNOSIS — Z79.899 HIGH RISK MEDICATION USE: ICD-10-CM

## 2022-05-05 DIAGNOSIS — D63.1 ANEMIA DUE TO CHRONIC KIDNEY DISEASE, ON CHRONIC DIALYSIS: ICD-10-CM

## 2022-05-05 DIAGNOSIS — D63.1 ANEMIA IN STAGE 3B CHRONIC KIDNEY DISEASE: ICD-10-CM

## 2022-05-05 DIAGNOSIS — N18.6 ANEMIA DUE TO CHRONIC KIDNEY DISEASE, ON CHRONIC DIALYSIS: ICD-10-CM

## 2022-05-05 DIAGNOSIS — C50.919 METASTATIC BREAST CANCER: Primary | ICD-10-CM

## 2022-05-05 DIAGNOSIS — N18.32 ANEMIA IN STAGE 3B CHRONIC KIDNEY DISEASE: ICD-10-CM

## 2022-05-05 LAB
ABO GROUP BLD: NORMAL
ALBUMIN SERPL-MCNC: 3.4 G/DL (ref 3.5–5.2)
ALBUMIN/GLOB SERPL: 1.1 G/DL
ALP SERPL-CCNC: 71 U/L (ref 39–117)
ALT SERPL W P-5'-P-CCNC: 9 U/L (ref 1–33)
ANION GAP SERPL CALCULATED.3IONS-SCNC: 13.2 MMOL/L (ref 5–15)
AST SERPL-CCNC: 13 U/L (ref 1–32)
BASOPHILS # BLD AUTO: 0.07 10*3/MM3 (ref 0–0.2)
BASOPHILS NFR BLD AUTO: 2.2 % (ref 0–1.5)
BILIRUB SERPL-MCNC: 0.3 MG/DL (ref 0–1.2)
BLD GP AB SCN SERPL QL: NEGATIVE
BUN SERPL-MCNC: 52 MG/DL (ref 8–23)
BUN/CREAT SERPL: 11.1 (ref 7–25)
CALCIUM SPEC-SCNC: 8.5 MG/DL (ref 8.6–10.5)
CHLORIDE SERPL-SCNC: 106 MMOL/L (ref 98–107)
CO2 SERPL-SCNC: 22.8 MMOL/L (ref 22–29)
CREAT SERPL-MCNC: 4.69 MG/DL (ref 0.57–1)
DEPRECATED RDW RBC AUTO: 76.8 FL (ref 37–54)
EGFRCR SERPLBLD CKD-EPI 2021: 9.9 ML/MIN/1.73
EOSINOPHIL # BLD AUTO: 0.14 10*3/MM3 (ref 0–0.4)
EOSINOPHIL NFR BLD AUTO: 4.4 % (ref 0.3–6.2)
ERYTHROCYTE [DISTWIDTH] IN BLOOD BY AUTOMATED COUNT: 19.6 % (ref 12.3–15.4)
GLOBULIN UR ELPH-MCNC: 3 GM/DL
GLUCOSE SERPL-MCNC: 142 MG/DL (ref 65–99)
HCT VFR BLD AUTO: 22.6 % (ref 34–46.6)
HGB BLD-MCNC: 6.8 G/DL (ref 12–15.9)
IMM GRANULOCYTES # BLD AUTO: 0.04 10*3/MM3 (ref 0–0.05)
IMM GRANULOCYTES NFR BLD AUTO: 1.3 % (ref 0–0.5)
LYMPHOCYTES # BLD AUTO: 0.63 10*3/MM3 (ref 0.7–3.1)
LYMPHOCYTES NFR BLD AUTO: 19.7 % (ref 19.6–45.3)
MCH RBC QN AUTO: 33.2 PG (ref 26.6–33)
MCHC RBC AUTO-ENTMCNC: 30.1 G/DL (ref 31.5–35.7)
MCV RBC AUTO: 110.2 FL (ref 79–97)
MONOCYTES # BLD AUTO: 0.37 10*3/MM3 (ref 0.1–0.9)
MONOCYTES NFR BLD AUTO: 11.6 % (ref 5–12)
NEUTROPHILS NFR BLD AUTO: 1.95 10*3/MM3 (ref 1.7–7)
NEUTROPHILS NFR BLD AUTO: 60.8 % (ref 42.7–76)
NRBC BLD AUTO-RTO: 0 /100 WBC (ref 0–0.2)
PLATELET # BLD AUTO: 218 10*3/MM3 (ref 140–450)
PMV BLD AUTO: 8.4 FL (ref 6–12)
POTASSIUM SERPL-SCNC: 5.2 MMOL/L (ref 3.5–5.2)
PROT SERPL-MCNC: 6.4 G/DL (ref 6–8.5)
RBC # BLD AUTO: 2.05 10*6/MM3 (ref 3.77–5.28)
RH BLD: POSITIVE
SODIUM SERPL-SCNC: 142 MMOL/L (ref 136–145)
T&S EXPIRATION DATE: NORMAL
WBC NRBC COR # BLD: 3.2 10*3/MM3 (ref 3.4–10.8)

## 2022-05-05 PROCEDURE — 80053 COMPREHEN METABOLIC PANEL: CPT | Performed by: INTERNAL MEDICINE

## 2022-05-05 PROCEDURE — 86923 COMPATIBILITY TEST ELECTRIC: CPT

## 2022-05-05 PROCEDURE — 86900 BLOOD TYPING SEROLOGIC ABO: CPT

## 2022-05-05 PROCEDURE — 36415 COLL VENOUS BLD VENIPUNCTURE: CPT

## 2022-05-05 PROCEDURE — 99214 OFFICE O/P EST MOD 30 MIN: CPT | Performed by: NURSE PRACTITIONER

## 2022-05-05 PROCEDURE — 86850 RBC ANTIBODY SCREEN: CPT

## 2022-05-05 PROCEDURE — 85025 COMPLETE CBC W/AUTO DIFF WBC: CPT

## 2022-05-05 PROCEDURE — 86901 BLOOD TYPING SEROLOGIC RH(D): CPT

## 2022-05-05 RX ORDER — FUROSEMIDE 10 MG/ML
40 INJECTION INTRAMUSCULAR; INTRAVENOUS ONCE
Status: CANCELLED | OUTPATIENT
Start: 2022-05-05 | End: 2022-05-05

## 2022-05-05 RX ORDER — SODIUM CHLORIDE 9 MG/ML
250 INJECTION, SOLUTION INTRAVENOUS AS NEEDED
Status: CANCELLED | OUTPATIENT
Start: 2022-05-05

## 2022-05-05 NOTE — PROGRESS NOTES
"Chief Complaint  Metastatic lobular breast cancer (ER/WV positive, HER-2/tj negative), anemia secondary to CKD3, CHF, peripheral neuropathy, chemotherapy related nausea chemotherapy-induced myelosuppression    Subjective        History of Present Illness  Returns the office today week follow-up and lab review.  She received Faslodex 2 weeks ago.  She does continue on Ibrance 7 days on, 7 days off.  She is currently in her week of therapy.  She reports she is overall feeling very well.  She has not yet been seen by radiation oncology but will be seen next week with plans for radiation to her L2 lesion.  She reports very infrequent back stiffness though denies pain.  She reports her energy is overall good.  She is feeling better since receiving transfusion 2 weeks ago.  She denies signs or symptoms of bleeding.  She does receive erythropoietin through dialysis which is scheduled for Monday, Wednesday, Friday.  She is eating and drinking adequately.  She denies chest pain or shortness of breath.       Objective   Vital Signs:   /62   Pulse 68   Temp 97.6 °F (36.4 °C) (Temporal)   Resp 16   Ht 170.2 cm (67.01\")   SpO2 96%   BMI 54.65 kg/m²       Physical Exam  Constitutional:       Appearance: She is well-developed. She is obese.      Comments: Patient is in a motorized scooter today   Eyes:      Conjunctiva/sclera: Conjunctivae normal.   Neck:      Thyroid: No thyromegaly.   Cardiovascular:      Rate and Rhythm: Normal rate and regular rhythm.      Heart sounds: Murmur heard.     No friction rub. No gallop.      Comments: 2/6 murmur  Pulmonary:      Effort: No respiratory distress.      Breath sounds: Normal breath sounds.      Comments: Dialysis access in place in the right subclavian position  Abdominal:      General: Bowel sounds are normal. There is no distension.      Palpations: Abdomen is soft.      Tenderness: There is no abdominal tenderness.   Musculoskeletal:         General: Swelling present. "      Comments: 1+ bilateral lower extremity edema with venous stasis changes noted.   Skin:     General: Skin is warm and dry.      Findings: No bruising or rash.   Neurological:      Mental Status: She is alert and oriented to person, place, and time.      Cranial Nerves: No cranial nerve deficit.      Motor: No abnormal muscle tone.      Deep Tendon Reflexes: Reflexes normal.   Psychiatric:         Behavior: Behavior normal.        I have reexamined the patient and the results are consistent with the previously documented exam. BETY Rivera      Result Review :  Results from last 7 days   Lab Units 05/05/22  1013   WBC 10*3/mm3 3.20*   NEUTROS ABS 10*3/mm3 1.95   HEMOGLOBIN g/dL 6.8*   HEMATOCRIT % 22.6*   PLATELETS 10*3/mm3 218     Results from last 7 days   Lab Units 05/05/22  1013   SODIUM mmol/L 142   POTASSIUM mmol/L 5.2   CHLORIDE mmol/L 106   CO2 mmol/L 22.8   BUN mg/dL 52*   CREATININE mg/dL 4.69*   CALCIUM mg/dL 8.5*   ALBUMIN g/dL 3.40*   BILIRUBIN mg/dL 0.3   ALK PHOS U/L 71   ALT (SGPT) U/L 9   AST (SGOT) U/L 13   GLUCOSE mg/dL 142*             IMAGING:  MRI Lumbar Spine With & Without Contrast (05/03/2022 11:42)  NM Bone Scan Whole Body (05/03/2022 13:59)  Results were discussed with the patient today, 5/5/2022    Assessment and Plan    *Metastatic lobular breast cancer (ER/PA positive, HER-2/tj negative):  · Previous stage IIIC right breast cancer in 2003, received neoadjuvant chemotherapy (unknown regimen) with subsequent bilateral mastectomies 1/22/2004 with right axillary dissection.  Residual 10-12 cm invasive lobular carcinoma on the right breast with 14/16+ nodes with extranodal extension.  Received adjuvant carboplatin and Navelbine chemotherapy x4 cycles  · Attempted adjuvant radiation to the chest wall however interrupted due to cellulitis that required removal of implants in August 2004  · Received tamoxifen from 6/22/2004 until June 2009.  Transitioned to Femara and  received until June 2014  · Identification of CT findings concerning for carcinomatosis on CT scans and June 2019.  · PET scan confirmed hypermetabolic activity in the omentum as well as small retroperitoneal lymph nodes.  · CT-guided omental biopsy 7/30/2019 with metastatic lobular carcinoma of breast primary, ER positive (greater than 95%), SC positive (90%), HER-2/tj negative (1+ IHC)  · [facility] liquid biopsy 2/5/2020: MSI undetermined, mutations in BETH, NF1, CHEK2.  No evidence of PIK3CA mutation.  · Subsequent [facility] germline testing 11/12/2021 with BETH VUS (c.2864C>A) and POLE VUS (c.631A>T)  · Initiation of Aromasin and Ibrance in August 2019  · Difficulty with myelosuppression related to Ibrance requiring dose alterations, eventually changed to 100 mg for 7 days on followed by 7 days off.  · CT chest abdomen pelvis 10/26/2021 with increase in left hydronephrosis with increase in left perinephric and periureteral stranding. Decrease in stone in the left kidney lower pole (7 mm).  Stable small retroperitoneal lymph and left periaortic lymph nodes.  · PET scan 11/10/2021 with multiple bilateral hypermetabolic nodular pulmonary lesions, asymmetric moderate to severe left hydronephrosis with ill-defined fat stranding and soft tissue thickening in the renal sinus and surrounding the left ureter, hypermetabolic left retroperitoneal lymph node with slight increase in size, ill-defined hypermetabolic thickening in the inferior omentum with increase in size, uptake and C7 (indeterminate, recommended MRI).  Short segments of uptake in the mid and distal esophagus possibly related to gastritis.  · PET scan findings felt to be consistent with progressive malignancy.  Patient declined repeat omental biopsy.   · Options for further treatment discussed on 11/12/2021.  In light of renal failure and dialysis, options are more limited.  Recommendation to continue Ibrance and discontinue Aromasin, begin Faslodex.   Discussed possible future use of single agent Taxol.    · Aromasin discontinued 11/12/2021  · On 11/22/2021 initiation of Faslodex with continuation of Ibrance (100 mg daily for 7 days on followed by 7 days off).  · MRI cervical spine 11/18/2021 showed the right C7 normality to likely represent metastatic disease.  With solitary bone lesion, did not recommend pursuit of bone modifying agent.  · Following 2 cycles Faslodex/Ibrance, PET scan on 1/11/2022 showed no change in the soft tissue superior to the dome of the bladder with hypermetabolic activity (likely related to carcinomatosis).  Significant decrease in injection of fat around the left renal pelvis and stable retroperitoneal hypermetabolic lymph nodes, decrease in size and activity in small bilateral pulmonary nodules.  Findings felt to represent evidence of response to treatment.    · Ibrance held briefly beginning 1/28/2022 due to hospitalization with COVID-19 infection and C. difficile colitis.  Patient developed leukopenia/neutropenia.  Ibrance resumed at previous dosing (100 mg daily 7 days on followed by 7 days off) on 2/9/22.  · Following 5 cycles Faslodex/Ibrance PET scan 4/19/2022 with evidence of mixed response.  Due to hypermetabolic lesion spinous process L2 (SUV 5.1) and slight increase in activity left clavicular metastasis (SUV increased 4.6-8.1).  Decrease in uptake involving omental thickening.  Stable soft tissue thickening around the left renal pelvis and ureter.  Discussion again regarding potential pursuit of IV chemotherapy with Taxol versus continuation of Ibrance and Faslodex with radiation to L2 spinous process (solitary new site of bony metastasis).  Patient opted to defer initiation of systemic chemotherapy and continue Ibrance/Faslodex with consideration of radiation to L2 lesion.  · Reevaluation 5/5/2022, continuing on Ibrance 100 mg daily 7 days on, 7 days off.  She has not yet been seen by radiation oncology.  She did undergo  a bone scan which was negative for metastatic disease thankfully.  Additionally, she underwent MRI of the spine with area of enhancement at L2 S2.  Patient is scheduled to be seen by radiation oncology 5/11/2022.  She is thankfully asymptomatic of these areas.  She is noted to be anemic today with hemoglobin of 6.8 we will therefore proceed with transfusion of 2 units packed red blood cells.  She will return in 2 weeks for CBC, CMP, magnesium, phosphorus, MD follow-up with Dr. Irvin.  She will be due for Faslodex at that time.  Additionally, she will initiate Xgeva after conversation with Dr. Ledesma and Dr. Irvin.  Consider PET scan at 2 to 3-month interval.     *Anemia secondary to ESRD, exacerbated by Ibrance:  · Anemia secondary to ESRD, malignancy with exacerbation by use of Ibrance  · Previous evidence of folate deficiency 8/12/2019 with level 3.84, initiated folic acid 1 mg daily  · Previous evidence of iron deficiency, received Injectafer on 8/7/2020 750 mg IV x1 dose.  Second dose not administered due to onset of fever, subsequent iron studies precluded further administration of IV iron.  · Previous low normal B12 level initiated oral B12 1000 mcg daily.  · Patient had previously received Procrit and required intermittent transfusion support  · Following initiation of hemodialysis, management of BEAU transitioned to nephrology, receiving Epogen with dialysis.  · Ongoing transfusion support prompted alteration in Ibrance dosing on 8/23/2021.  · After alteration in Ibrance dosing, hemoglobin improved and remained stable in the 9-10 range.  · Improved but ongoing intermittent need for transfusion support despite Ibrance dose alteration  · Stool negative for occult blood x3 on 11/2/2021  · Patient with reduced dialysis frequency to 2 days/week with concurrent decrease in Epogen dosing corresponding again to increased transfusion requirement.  · Epogen dose increased per nephrology to 20,000 units twice weekly with  dialysis on Mondays and Fridays  · Ibrance again exacerbating anemia with ongoing intermittent transfusion requirements  · Hemoglobin on 3/31/2022 6.6 with 2 unit PRBC transfusion performed on 4/1/2022  · Hemoglobin remains declined today at 6.8 though this is slightly improved compared to 2 weeks ago.  We will again proceed with transfusion of 2 units packed red blood cells.  Under the direction of nephrology, will proceed with 40 mg Lasix following each unit.  The patient is remarkably asymptomatic of her anemia    *ESRD on HD:  · Patient with previous CKD3/4  · Hospitalization 4/29-5/4/2021 with RYAN/CKD, UTI, anemia.  Required initiation of hemodialysis Tuesday Thursday Saturday.   · Decrease in frequency of dialysis to twice per week.  She continues to produce a significant amount of urine.    · Status post left upper extremity AV fistula placement on 11/3/2021 by vascular surgery  · Attempt to hold further dialysis on 1/11/2022 with close monitoring of her laboratory and clinical parameters.  Dialysis quickly resumed due to development of hyperkalemia.  · Patient continues on dialysis 2 days/week on Mondays and Fridays.  Per patient report potential future consideration to hold dialysis again in the future.  Patient is followed closely by Dr. Antonio.      *CHF with mild to moderate aortic stenosis:  · Echocardiogram 7/12/2019 showing ejection fraction 48% with moderate dilation of the LV cavity, global hypokinesis, grade 2 diastolic dysfunction, mild to moderate aortic stenosis, trivial pericardial effusion.  · Echocardiogram 7/19/2021 with ejection fraction 56%, left atrial volume moderately increased, grade 2 diastolic dysfunction, severe calcification aortic valve, moderate aortic stenosis, severe mitral calcification, mild mitral stenosis, marked elevation in RVSP from tricuspid regurgitation.  · Patient notes that she discussed echo results with cardiology and valvular status was felt to be stable in  regards to aortic stenosis.    *Left upper and bilateral lower extremity peripheral neuropathy:  · Patient reports that left upper extremity neuropathy was not present from previous chemotherapy but occurred after hospitalization that required intubation and critical care stay.  Apparently felt to represent critical care neuropathy.  Improved over time.  · Bilateral lower extremity neuropathy felt to be related to diabetes  · May have future implications regarding treatment for breast cancer.  · Patient reports that peripheral neuropathy symptoms continue in the bilateral lower extremities, unchanged.  This will be a consideration with potential future use of Taxol    *Uterine/endometrial activity on PET scan:  · Patient experienced postmenopausal vaginal bleeding in 2013, negative endometrial biopsy and gynecologic evaluation.  · With findings on PET scan with enlarging uterus and some hypermetabolic activity, referred back to Dr. Oliveros in gynecology.    · 9/19/2019 D&C with hysteroscopy by Dr. Oliveros, no significant intraoperative findings, pathologic results with benign findings, no evidence of malignancy.    *Nausea:  · Mild, relieved with Zofran.   · Patient experienced improvement in nausea after initiating hemodialysis  · No recent nausea    *Myelosuppression secondary to Ibrance:  · WBC during hospitalization 1/28-1/30/2022 declined into the 1.9-2.0 range due to concurrent COVID-19 infection.  Ibrance was held briefly.  · Ibrance resumed on 2/9/2022 with WBC up to 4.49, ANC 3.07  · Today, WBC 3.20, ANC 1.95 she is currently on her week of taking Ibrance    *Depression  · Patient with history of depression, worsened after the death of her son in November 2021  · With evidence of progressive malignancy in November 2021, patient agreeable to referral to supportive oncology  · Patient was seen in supportive oncology 11/18/2021, Zoloft increased from 50 up to 100 mg daily.  Prescribed armodafinil 50 mg daily  however there or issues with insurance coverage  · Patient does continue with difficulties regarding depression    *Left perinephric stranding secondary to malignancy with hydronephrosis:  · CT 4/22/2021 showed interval enlargement of 2 staghorn calculi in the left kidney with persistent left perinephric stranding  · Hospitalization 4/29-5/4/2021 with RYAN/CKD, UTI, anemia.  Required 2 unit PRBC transfusion and initiated hemodialysis Tuesday Thursday Saturday.    · Discussion from urology regarding potential need for surgical procedure to address staghorn calculus left kidney  · CT scan 7/2/2021 with only 1 remaining left renal stone identified which had decreased in size.  Patient appears to have passed the other stone.  · As above, CT 10/26/2021 with more prominent left hydronephrosis and increase in left perinephric and periureteral stranding.  Felt to possibly be related to passage of recent stone versus partial obstruction secondary to debris or thrombus in the left ureter versus pyelonephritis.  Patient however with no clinical evidence of recent stone passage nor pyelonephritis. Malignancy felt to be the cause of CT changes around the left kidney at this point.   · Left ureteral stent replacement 12/16/2021  · PET scan 1/11/2022 with decrease in fat injection near left renal pelvis, stent in satisfactory position.  Mild residual hydronephrosis on the left.  · Ureteral stent replaced on 3/10/2022    *Right thyroid nodules  · Patient underwent prior thyroid biopsy by her report with benign findings many years ago, records not available  · On MRI cervical spine 11/18/2021, finding of 1.8 cm right thyroid nodule  · Thyroid ultrasound 11/26/2021 with right-sided thyroid nodules, 1 nodule was hypoechoic, solid measuring 2 cm with biopsy recommended.  · Thyroid ultrasound results reviewed with patient.  In light of her metastatic breast cancer, renal failure the significance of the thyroid nodule is felt to be much  less.  We discussed possibility of thyroid biopsy however patient is inclined to forego this, especially since she has had a biopsy performed in the past with benign findings by her report.    · No hypermetabolic activity identified on PET scan 1/11/2022 in the neck    *Hospitalization 1/28-1/30/2022 due to COVID-19 infection and C. difficile colitis  · Patient fully vaccinated with 3 doses Moderna COVID-19 vaccine  · Patient developed symptoms 1/26/2022 with abrupt onset sinus congestion, mild cough, subsequently developed nausea and diarrhea on 1/27/2022.  Significant fatigue.  · Patient tested positive in emergency department on 1/28/2022 and was admitted  · Patient maintained O2 saturation in mid 90% range on room air  · Patient received remdesivir  · Patient was also found to be positive for C. difficile colitis, received course of oral vancomycin.  · Today, patient returns reporting that symptoms from her COVID-19 infection and C. difficile colitis have largely resolved.  She reports minimal residual cough and congestion, no further diarrhea.    Plan:  1. Continue Ibrance 100 mg daily for 7 days on followed by 7 days off (patient initiated a week of Ibrance on 5/42022).  2. Transfuse 2 unit PRBC Sunday, 5/8/2022.  The patient received 40 mg Lasix following each unit of blood as instructed by nephrology, Dr. Antonio  3. Continue oral folic acid 1 mg daily, B12 1000 mcg daily.  4. The patient is continuing on hemodialysis every Monday and Friday and is receiving Epogen 20,000 units with each dialysis per nephrology.  Patient is also receiving IV iron every Monday with dialysis.  5. Patient is aware that if she has an attempted break from hemodialysis she would need to continue on BEAU at least weekly either with nephrology or through our office with Procrit.  6. Bone scan and MRI of the spine reviewed with the patient today  7. Follow-up with radiation oncology with plans for radiation to L2, the patient is being  seen in consult 5/11/2022  8. Dr. Irvin did speak with Dr. Antonio regarding the use of Xgeva, this was felt to be acceptable and will be initiated at follow-up in 2 weeks  9. In 2 weeks MD visit with CBC, CMP.  Patient will be due for cycle 7-day 1 Faslodex and initiation of Xgeva.  We will discuss follow-up PET scan at a 2-month interval.  If there is further evidence of progression we will need to consider moving to palliative single agent IV Taxol    Patient continues on high risk medication requiring intensive monitoring.    Annabelle Atwood, APRN  05/05/2022

## 2022-05-08 ENCOUNTER — INFUSION (OUTPATIENT)
Dept: ONCOLOGY | Facility: HOSPITAL | Age: 62
End: 2022-05-08

## 2022-05-08 VITALS
OXYGEN SATURATION: 98 % | DIASTOLIC BLOOD PRESSURE: 50 MMHG | RESPIRATION RATE: 18 BRPM | HEART RATE: 58 BPM | TEMPERATURE: 98.1 F | SYSTOLIC BLOOD PRESSURE: 123 MMHG

## 2022-05-08 DIAGNOSIS — Z99.2 ANEMIA DUE TO CHRONIC KIDNEY DISEASE, ON CHRONIC DIALYSIS: ICD-10-CM

## 2022-05-08 DIAGNOSIS — N18.6 ANEMIA DUE TO CHRONIC KIDNEY DISEASE, ON CHRONIC DIALYSIS: ICD-10-CM

## 2022-05-08 DIAGNOSIS — D63.1 ANEMIA DUE TO CHRONIC KIDNEY DISEASE, ON CHRONIC DIALYSIS: ICD-10-CM

## 2022-05-08 PROCEDURE — 86900 BLOOD TYPING SEROLOGIC ABO: CPT

## 2022-05-08 PROCEDURE — 96374 THER/PROPH/DIAG INJ IV PUSH: CPT

## 2022-05-08 PROCEDURE — 36430 TRANSFUSION BLD/BLD COMPNT: CPT

## 2022-05-08 PROCEDURE — 25010000002 FUROSEMIDE PER 20 MG: Performed by: NURSE PRACTITIONER

## 2022-05-08 PROCEDURE — P9016 RBC LEUKOCYTES REDUCED: HCPCS

## 2022-05-08 RX ORDER — FUROSEMIDE 10 MG/ML
40 INJECTION INTRAMUSCULAR; INTRAVENOUS ONCE
Status: DISCONTINUED | OUTPATIENT
Start: 2022-05-08 | End: 2022-05-08

## 2022-05-08 RX ORDER — FUROSEMIDE 10 MG/ML
40 INJECTION INTRAMUSCULAR; INTRAVENOUS ONCE
Status: COMPLETED | OUTPATIENT
Start: 2022-05-08 | End: 2022-05-08

## 2022-05-08 RX ORDER — SODIUM CHLORIDE 9 MG/ML
250 INJECTION, SOLUTION INTRAVENOUS AS NEEDED
Status: DISCONTINUED | OUTPATIENT
Start: 2022-05-08 | End: 2022-05-08 | Stop reason: HOSPADM

## 2022-05-08 RX ADMIN — FUROSEMIDE 40 MG: 10 INJECTION, SOLUTION INTRAMUSCULAR; INTRAVENOUS at 12:09

## 2022-05-11 ENCOUNTER — APPOINTMENT (OUTPATIENT)
Dept: RADIATION ONCOLOGY | Facility: HOSPITAL | Age: 62
End: 2022-05-11

## 2022-05-11 ENCOUNTER — CONSULT (OUTPATIENT)
Dept: RADIATION ONCOLOGY | Facility: HOSPITAL | Age: 62
End: 2022-05-11

## 2022-05-11 ENCOUNTER — PATIENT MESSAGE (OUTPATIENT)
Dept: ONCOLOGY | Facility: CLINIC | Age: 62
End: 2022-05-11

## 2022-05-11 VITALS
HEART RATE: 67 BPM | SYSTOLIC BLOOD PRESSURE: 134 MMHG | BODY MASS INDEX: 55.9 KG/M2 | OXYGEN SATURATION: 95 % | DIASTOLIC BLOOD PRESSURE: 63 MMHG | RESPIRATION RATE: 18 BRPM | WEIGHT: 293 LBS | TEMPERATURE: 98.3 F

## 2022-05-11 DIAGNOSIS — C50.919 METASTATIC BREAST CANCER: Primary | ICD-10-CM

## 2022-05-11 PROCEDURE — G0463 HOSPITAL OUTPT CLINIC VISIT: HCPCS

## 2022-05-11 PROCEDURE — 99204 OFFICE O/P NEW MOD 45 MIN: CPT | Performed by: RADIOLOGY

## 2022-05-11 RX ORDER — FOLIC ACID 1 MG/1
1 TABLET ORAL DAILY
Qty: 30 TABLET | Refills: 3 | Status: ON HOLD | OUTPATIENT
Start: 2022-05-11 | End: 2022-08-23

## 2022-05-11 NOTE — PROGRESS NOTES
Subjective     Leodan Irvin Jr., MD    Cancer Staging  No matching staging information was found for the patient.    CC: stage IV breast cancer with spine metastases                                Dear Leodan Irvin Jr., MD    I had the pleasure of seeing Juana Hall  today in the [facility]    The patient is a 61 y.o. female with metastatic breast cancer.  She was first diagnosed with stage III lobular breast cancer in 2003 treated with neoadjuvant chemo, bilateral mastectomy.  She attempted radiation to the right chest wall and regional nodes but had to stop early due to toxicity.      She developed metastatic carcinomatosis in July 2019.  Foundation liquid biopsy showed mutation in BETH, NF1 and CHEK2.   [facility] germline testing showed VUS in BETH and POLE.   PET scan July 2019 showed increased uptake in omentum, retroperitoneal nodes, thickening of endometrium but no bony metastatic disease. She started on ibrance and aromasin in August 2019.     She had a PET scan on 11/10/21 which showed multiple bilateral hypermetabolic pulmonary nodules, ill defined hypermetabolic thickening of inferior omentum, new uptake in C7 suv of 5.2 and focal uptake in distal left clavicle in area adjacent to degenerative changed favored to be reactive.  She had a mri of the cervical spine on 11/18/21 which showed right C7 abnormality, likely representing metastatic disease. PET scan on 1/11/22 showed no change in soft tissue superior to dome of bladder with hypermetabolic activity likely carcinomatosis, decrease in size and activity of bilateral pulmonary nodules, increase uptake in C7 slightly from 5.1 to 7.1ng/ml and no evidence of bony disease elsewhere.       She is on dialysis every M,W,F for ESRD.      She had a pet scan on 4/21/22 which showed multiple fdg avid lesions including a mixed lytic and blastic lesion in posterior spinous process of L2 with max suv of 5.1 increased from 3.7, mixed lesion in C7 with max suv  of 7.8 same as previous suv, left clavicle suv of 8 increased from 4.6 on 1/11/22, mutiple hypermetabolic pelvic nodes, and decreased uptake in bandlike area of omental thickening.         She had a bone scan on 5/3/22 which showed no evidence of osseous metastases.  She had a mri of the lumbar spine on 5/3/22 which showed abnormal enhancement involving the spinous process of L2 and S2 vertebral body consistent with metastatic lesions. The lesion at S2 was also felt to possibly represent a small hemangioma.     She is referred today for evaluation for radiation to the L2 spinous process.  She reports occasional discomfort in her low back.  She is now on Ibrance and Faslodex.  She receives ibrance 7 days on and 7 days off.  Last dose May 2.                     Review of Systems   Constitutional: Negative.    HENT: Negative.    Respiratory: Negative.    Cardiovascular: Negative.    Genitourinary: Positive for enuresis, frequency and urgency.   Musculoskeletal: Positive for back pain.   Neurological: Positive for numbness.   Hematological: Negative.    Psychiatric/Behavioral: Positive for decreased concentration, dysphoric mood and sleep disturbance. The patient is nervous/anxious.          Past Medical History:   Diagnosis Date   • Anemia    • Anxiety and depression    • Bicuspid aortic valve    • Breast cancer (HCC) 2004    RIGHT, HAD NEELA. MASTECTOMY, CHEMO AND RADIATION   • CHF (congestive heart failure) (HCC)    • CKD (chronic kidney disease)    • Diabetes mellitus (HCC)    • Dialysis patient (HCC)     TUES AND SATURDAY REBEKA GUIDO   • Elevated cholesterol    • Frequent UTI    • Heart murmur    • History of kidney stones    • History of MRSA infection 2005    POST BREAST SURGERY-TREATED [facility]   • History of sepsis 2010    KIDNEY STONE   • History of transfusion    • Hyperlipidemia    • Hypertension    • Hyperthyroidism    • Hypothyroidism    • Kidney stone     CURRENT LEFT-DEC 2021   • Lymphedema of leg     LEFT   •  Metastasis from breast cancer (HCC)     STOMACH-OMENTUM   • Neuromuscular disorder (HCC)    • Obesity    • ANDREW on CPAP     CPAP   • Osteoarthrosis    • Peripheral neuropathy     FEET BILAT   • Radiculopathy    • Thickened endometrium    • Weakness     BILAT LEGS-WITH ANY AMT OF TIME         Past Surgical History:   Procedure Laterality Date   • ARTERIOVENOUS FISTULA/SHUNT SURGERY Left 11/3/2021    Procedure: LEFT  BRACHIOCEPALIC ARTERIOVENOUS FISTULA;  Surgeon: Dejuan Adler MD;  Location: [facility] MAIN OR;  Service: Vascular;  Laterality: Left;   • BREAST RECONSTRUCTION      WITH IMPLANTS, AND REVISION, NOW REMOVED   • BREAST SURGERY     • CATARACT EXTRACTION WITH INTRAOCULAR LENS IMPLANT Bilateral    •  SECTION  1987   •  SECTION  1987   • CYSTOSCOPY W/ URETERAL STENT PLACEMENT     • CYSTOSCOPY W/ URETERAL STENT PLACEMENT Left 2021    Procedure: CYSTOSCOPY KEFT RETROGRADE PYELOGRAM  LEFT  URETERAL STENT PLACEMENT;  Surgeon: Leodan Mckee Jr., MD;  Location: [facility] OR;  Service: Urology;  Laterality: Left;   • CYSTOSCOPY W/ URETERAL STENT PLACEMENT Left 3/10/2022    Procedure: CYSTOSCOPY AND LEFT URETERAL STENT EXCHANGE, RETROGRADE PYELOGRAM;  Surgeon: Leodan Mckee Jr., MD;  Location: [facility] OR;  Service: Urology;  Laterality: Left;   • D & C HYSTEROSCOPY N/A 2017    Procedure: DILATATION AND CURETTAGE, HYSTEROSCOPY ;  Surgeon: Cherie Oliveros MD;  [place] [facility] [place] [facility]  Service:    • D & C HYSTEROSCOPY N/A 2019    Procedure: DILATATION AND CURETTAGE HYSTEROSCOPY/POLYPECTOMY;  Surgeon: Cherie Oliveros MD;  [place] [facility] [place] [facility]  Service: Gynecology   • INSERTION HEMODIALYSIS CATHETER Right 2021    Procedure: HEMODIALYSIS CATHETER INSERTION;  Surgeon: Clint Hernández MD;  Location: [facility] MAIN OR;  Service: Vascular;  Laterality: Right;   • LYMPH NODE BIOPSY     • MASTECTOMY Bilateral          Social  History     Socioeconomic History   • Marital status:    Tobacco Use   • Smoking status: Never Smoker   • Smokeless tobacco: Never Used   Vaping Use   • Vaping Use: Never used   Substance and Sexual Activity   • Alcohol use: No   • Drug use: No   • Sexual activity: Yes     Partners: Male     Birth control/protection: Post-menopausal         Family History   Problem Relation Age of Onset   • Coronary artery disease Mother         Mother  from MI at 72   • Diabetes Mother    • Breast cancer Mother    • Hyperlipidemia Mother    • Arthritis Mother    • Cancer Mother    • Aortic aneurysm Father         Father with ruptured thoracic aortic aneurysm   • Coronary artery disease Father         Father  from MI at 74   • Heart disease Father    • Hyperlipidemia Father    • Arthritis Father    • Coronary artery disease Maternal Grandmother         MGM deceeased from MI at age 76   • Malig Hyperthermia Neg Hx           Objective    Physical Exam  Constitutional:       Appearance: She is obese.   HENT:      Head: Atraumatic.   Pulmonary:      Effort: Pulmonary effort is normal.   Musculoskeletal:      Comments: Decreased rom of back and lower extremities   Neurological:      General: No focal deficit present.   Psychiatric:         Mood and Affect: Mood normal.         Behavior: Behavior normal.           Current Outpatient Medications on File Prior to Visit   Medication Sig Dispense Refill   • acetaminophen (TYLENOL) 500 MG tablet Take 500 mg by mouth As Needed.     • amLODIPine (NORVASC) 5 MG tablet Take 1 tablet by mouth Daily. 90 tablet 3   • amoxicillin (AMOXIL) 500 MG capsule TAKE 4 CAPSULES BY MOUTH 1 HOUR PRIOR TO DENTAL APPOINTMENT (Patient taking differently: Take 2,000 mg by mouth Take As Directed. Take 4 capsules by mouth 1 hour prior to dental appointment) 4 capsule 1   • Armodafinil 250 MG tablet Take 1 tablet by mouth Daily for 30 days. 30 tablet 0   • aspirin 81 MG EC tablet Take 81 mg by  mouth Daily. HELD FOR SURGERY     • atorvastatin (LIPITOR) 40 MG tablet Take 1 tablet by mouth Every Night. 90 tablet 1   • carvedilol (COREG) 3.125 MG tablet Take 1 tablet by mouth 2 (Two) Times a Day. 180 tablet 3   • folic acid (FOLVITE) 1 MG tablet TAKE ONE TABLET BY MOUTH DAILY (Patient taking differently: Take 1 mg by mouth Daily.) 30 tablet 2   • Levemir FlexTouch 100 UNIT/ML injection Inject 40 Units under the skin into the appropriate area as directed Daily. 13 pen 1   • levothyroxine (SYNTHROID, LEVOTHROID) 50 MCG tablet Take 1 tablet by mouth Daily. 90 tablet 1   • ondansetron (Zofran) 8 MG tablet Take 1 tablet by mouth Every 8 (Eight) Hours As Needed for Nausea or Vomiting. 30 tablet 2   • Palbociclib 100 MG tablet tablet Take 1 tablet by mouth Daily. Take for 7 days on, then 7 days off. (Patient taking differently: Take 100 mg by mouth Take As Directed. Take for 7 days on, then 7 days off.) 14 tablet 6   • QUEtiapine (SEROquel) 50 MG tablet Take 1 tablet by mouth Every Night. 90 tablet 0   • sertraline (ZOLOFT) 100 MG tablet Take 1.5 tablets by mouth Daily. (Patient taking differently: Take 150 mg by mouth Every Night.) 135 tablet 0   • vitamin B-12 (CYANOCOBALAMIN) 1000 MCG tablet Take 1,000 mcg by mouth Daily.     • vitamin D (ERGOCALCIFEROL) 89966 units capsule capsule Take 50,000 Units by mouth Every 7 (Seven) Days. WEDNESDAY     • [DISCONTINUED] HYDROcodone-acetaminophen (NORCO) 5-325 MG per tablet Take 1-2 tablets by mouth Every 4 (Four) Hours As Needed for Moderate Pain  (Pain). 12 tablet 0     No current facility-administered medications on file prior to visit.       ALLERGIES:  No Known Allergies    /63   Pulse 67   Temp 98.3 °F (36.8 °C)   Resp 18   Wt (!) 162 kg (357 lb)   LMP  (LMP Unknown)   SpO2 95%   BMI 55.90 kg/m²      Current Status 4/21/2022   ECOG score 1         Assessment & Plan     61 year old female with stage IV breast cancer first diagnosed in 2003 with omental  metastases in 2019.  Her disease has been fairly well controlled on ibrance and faslodex for the past year with decerase in omental disease, lymphadenopathy, but progressive disease in L2 spinous process and possible the left clavicle and S2.  The lesion at C7 appears stable with similar suv on the last 2 PET scans.       I discussed with her the role for palliative radiation therapy to L2, in either 5 or 10 fractions.  I discussed possible acute side effects of fatigue, skin erythema, abdominal cramping or diarrhea.  I discussed possible late effects of small bowel obstruction or fracture.      She voiced understanding and wishes to proceed.  We will proceed with simulation today and begin her treatments in the next 1-2 weeks.  I will discuss her case at our peer review conference to determine if she may be a candidate for SBRT.       I personally spent greater than 30 minutes today assessing, managing, discussing and documenting my visit with the patient. That time includes review of records, imaging and pathology reports, obtaining my own history, performing a medically appropriate evaluation, counseling and educating the patient, discussing goals, logistics, alternatives and risks of my recommendations, surveillance options and potential outcomes. It also includes the time documenting the clinical information in the EMR and communicating my recommendations to the other involved physicians.                 Thank you very much for allowing me to participate in the care of this very pleasant patient.    Sincerely,      Lou Sotomayor MD

## 2022-05-11 NOTE — TELEPHONE ENCOUNTER
From: Juana Hall  To: Leodan Irvin MD  Sent: 5/11/2022 11:29 AM EDT  Subject: Folic acid     I am trying to get a refill on my folic acid. [facility] said they reached out to you several times with no response. Please contact them so I can get a refill

## 2022-05-16 ENCOUNTER — OFFICE VISIT (OUTPATIENT)
Dept: RADIATION ONCOLOGY | Facility: HOSPITAL | Age: 62
End: 2022-05-16

## 2022-05-16 VITALS
TEMPERATURE: 97.9 F | RESPIRATION RATE: 18 BRPM | HEART RATE: 65 BPM | OXYGEN SATURATION: 98 % | DIASTOLIC BLOOD PRESSURE: 55 MMHG | SYSTOLIC BLOOD PRESSURE: 94 MMHG

## 2022-05-16 DIAGNOSIS — C50.919 METASTATIC BREAST CANCER: Primary | ICD-10-CM

## 2022-05-16 PROCEDURE — 99214 OFFICE O/P EST MOD 30 MIN: CPT | Performed by: RADIOLOGY

## 2022-05-16 PROCEDURE — 77334 RADIATION TREATMENT AID(S): CPT

## 2022-05-16 PROCEDURE — 77263 THER RADIOLOGY TX PLNG CPLX: CPT | Performed by: RADIOLOGY

## 2022-05-16 PROCEDURE — 77399 UNLISTED PX MED RADJ PHYSICS: CPT

## 2022-05-16 NOTE — PROGRESS NOTES
Subjective     No ref. provider found    Cancer Staging  No matching staging information was found for the patient.   No chief complaint on file.   CC: stage IV breast cancer with spine metastases                                Dear Leodan Irvin Jr., MD     I had the pleasure of seeing Juana Hall  today in the Radiation Center    The patient is a 61 y.o. female with metastatic breast cancer.  She was first diagnosed with stage III lobular breast cancer in 2003 treated with neoadjuvant chemo, bilateral mastectomy and radiation.    She developed metastatic carcinomatosis in July 2019.  Foundation liquid biopys showed mutation in BETH, NF1 and CHEK2.   [facility] germline testing showed VUS in BETH and POLE.   PET scan July 2019 showed increased uptake in omentum, retroperitoneal nodes, thickening of endometrium but no bony metastatic disease. She started on ibrance and aromasin in August 2019.    She had a PET scan on 11/10/21 which showed multiple bilateral hypermetabolic pulmonary nodules, ill defined hypermetabolic thickening of inferior omentum, new uptake in C7 suv of 5.2 and focal uptake in distal left clavicle in area adjacent to degenerative changed favored to be reactive.  She had a mri of the cervical spine on 11/18/21 which showed right C7 abnormality, likely representing metastatic disease. PET scan on 1/11/22 showed no change in soft tissue superior to dome of bladder with hypermetabolic activity likely carcinomatosis, decrease in size and activity of bilateral pulmonary nodules, increase uptake in C7 slightly from 5.1 to 7.1ng/ml and no evidence of bony disease elsewhere.       She is now on Ibrance and Faslodex.  She receives ibrance 7 days on and 7 days off.  Last dose May 2.      She is on dialysis every M,W,F for ESRD.     She had a pet scan on 4/21/22 which showed multiple fdg avid lesions including a mixed lytic and blastic lesion in posterior spinous process of L2 with max suv of 5.1 increased  "from 3.7, mixed lesion in C7 with max suv of 7.8 same as previous suv, left clavicle suv of 8 increased from 4.6 on 1/11/22, mutiple hypermetabolic pelvic nodes, and decreased uptake in bandlike area of omental thickening.       She had a bone scan on 5/3/22 which showed no evidence of osseous metastases.  She had a mri of the lumbar spine on 5/3/22 which showed abnormal enhancement involving the spinous process of L2 and S2 vertebral body consistent with metastatic lesions. The lesion at S2 was also felt to possibly represent a small hemangioma.    She is referred today for evaluation for radiation to the L2 spinous process.  She reports occasional discomfort in her low back.    Interval hx 5/16/22:  She returns today for re-evluation and treatment planning.  She reports increased discomfort in her low back at times.  She denies any new numbness or weakness of her legs.  She denies any pain in her left clavicle.  She does report some \"tightness\" of her neck, left greater than right at times.         Review of Systems   Constitutional: Positive for activity change.   HENT: Negative.    Genitourinary: Positive for enuresis and frequency.   Musculoskeletal: Positive for arthralgias, back pain, gait problem and neck stiffness.   Neurological: Positive for numbness.   Psychiatric/Behavioral: Positive for dysphoric mood and sleep disturbance. The patient is nervous/anxious.          Past Medical History:   Diagnosis Date   • Anemia    • Anxiety and depression    • Bicuspid aortic valve    • Breast cancer (HCC) 2004    RIGHT, HAD NEELA. MASTECTOMY, CHEMO AND RADIATION   • CHF (congestive heart failure) (HCC)    • CKD (chronic kidney disease)    • Diabetes mellitus (HCC)    • Dialysis patient (HCC)     TUES AND SATURDAY REBEKA GUIDO   • Elevated cholesterol    • Frequent UTI    • Heart murmur    • History of kidney stones    • History of MRSA infection 2005    POST BREAST SURGERY-TREATED [facility]   • History of sepsis 2010    " KIDNEY STONE   • History of transfusion    • Hyperlipidemia    • Hypertension    • Hyperthyroidism    • Hypothyroidism    • Kidney stone     CURRENT LEFT-DEC 2021   • Lymphedema of leg     LEFT   • Metastasis from breast cancer (HCC)     STOMACH-OMENTUM   • Neuromuscular disorder (HCC)    • Obesity    • ANDREW on CPAP     CPAP   • Osteoarthrosis    • Peripheral neuropathy     FEET BILAT   • Radiculopathy    • Thickened endometrium    • Weakness     BILAT LEGS-WITH ANY AMT OF TIME         Past Surgical History:   Procedure Laterality Date   • ARTERIOVENOUS FISTULA/SHUNT SURGERY Left 11/3/2021    Procedure: LEFT  BRACHIOCEPALIC ARTERIOVENOUS FISTULA;  Surgeon: Dejuan Adler MD;  Location: [facility];  Service: Vascular;  Laterality: Left;   • BREAST RECONSTRUCTION      WITH IMPLANTS, AND REVISION, NOW REMOVED   • BREAST SURGERY     • CATARACT EXTRACTION WITH INTRAOCULAR LENS IMPLANT Bilateral    •  SECTION  1987   •  SECTION  1987   • CYSTOSCOPY W/ URETERAL STENT PLACEMENT     • CYSTOSCOPY W/ URETERAL STENT PLACEMENT Left 2021    Procedure: CYSTOSCOPY KEFT RETROGRADE PYELOGRAM  LEFT  URETERAL STENT PLACEMENT;  Surgeon: Leodan Mckee Jr., MD;  Location: [facility] OR;  Service: Urology;  Laterality: Left;   • CYSTOSCOPY W/ URETERAL STENT PLACEMENT Left 3/10/2022    Procedure: CYSTOSCOPY AND LEFT URETERAL STENT EXCHANGE, RETROGRADE PYELOGRAM;  Surgeon: Leodan Mckee Jr., MD;  Location: [facility] OR;  Service: Urology;  Laterality: Left;   • D & C HYSTEROSCOPY N/A 2017    Procedure: DILATATION AND CURETTAGE, HYSTEROSCOPY ;  Surgeon: Cherie Oliveros MD;  Location: [facility];  Service:    • D & C HYSTEROSCOPY N/A 2019    Procedure: DILATATION AND CURETTAGE HYSTEROSCOPY/POLYPECTOMY;  Surgeon: Cherie Oliveros MD;  Location: [facility];  Service: Gynecology   • INSERTION HEMODIALYSIS CATHETER Right 2021    Procedure:  HEMODIALYSIS CATHETER INSERTION;  Surgeon: Clint Hernández MD;  Location: [facility];  Service: Vascular;  Laterality: Right;   • LYMPH NODE BIOPSY     • MASTECTOMY Bilateral          Social History     Socioeconomic History   • Marital status:    Tobacco Use   • Smoking status: Never Smoker   • Smokeless tobacco: Never Used   Vaping Use   • Vaping Use: Never used   Substance and Sexual Activity   • Alcohol use: No   • Drug use: No   • Sexual activity: Yes     Partners: Male     Birth control/protection: Post-menopausal         Family History   Problem Relation Age of Onset   • Coronary artery disease Mother         Mother  from MI at 72   • Diabetes Mother    • Breast cancer Mother    • Hyperlipidemia Mother    • Arthritis Mother    • Cancer Mother    • Aortic aneurysm Father         Father with ruptured thoracic aortic aneurysm   • Coronary artery disease Father         Father  from MI at 74   • Heart disease Father    • Hyperlipidemia Father    • Arthritis Father    • Coronary artery disease Maternal Grandmother         MGM deceeased from MI at age 76   • Malig Hyperthermia Neg Hx           Objective    Physical Exam  Constitutional:       Appearance: She is obese.   HENT:      Head: Atraumatic.   Pulmonary:      Effort: Pulmonary effort is normal.   Neurological:      General: No focal deficit present.      Mental Status: She is oriented to person, place, and time.      Cranial Nerves: No cranial nerve deficit.      Gait: Gait abnormal.   Psychiatric:         Mood and Affect: Mood normal.         Behavior: Behavior normal.           Current Outpatient Medications on File Prior to Visit   Medication Sig Dispense Refill   • acetaminophen (TYLENOL) 500 MG tablet Take 500 mg by mouth As Needed.     • amLODIPine (NORVASC) 5 MG tablet Take 1 tablet by mouth Daily. 90 tablet 3   • amoxicillin (AMOXIL) 500 MG capsule TAKE 4 CAPSULES BY MOUTH 1 HOUR PRIOR TO DENTAL APPOINTMENT (Patient  taking differently: Take 2,000 mg by mouth Take As Directed. Take 4 capsules by mouth 1 hour prior to dental appointment) 4 capsule 1   • aspirin 81 MG EC tablet Take 81 mg by mouth Daily. HELD FOR SURGERY     • atorvastatin (LIPITOR) 40 MG tablet Take 1 tablet by mouth Every Night. 90 tablet 1   • carvedilol (COREG) 3.125 MG tablet Take 1 tablet by mouth 2 (Two) Times a Day. 180 tablet 3   • folic acid (FOLVITE) 1 MG tablet TAKE ONE TABLET BY MOUTH DAILY (Patient taking differently: Take 1 mg by mouth Daily.) 30 tablet 2   • folic acid (FOLVITE) 1 MG tablet Take 1 tablet by mouth Daily. 30 tablet 3   • Levemir FlexTouch 100 UNIT/ML injection Inject 40 Units under the skin into the appropriate area as directed Daily. 13 pen 1   • levothyroxine (SYNTHROID, LEVOTHROID) 50 MCG tablet Take 1 tablet by mouth Daily. 90 tablet 1   • ondansetron (Zofran) 8 MG tablet Take 1 tablet by mouth Every 8 (Eight) Hours As Needed for Nausea or Vomiting. 30 tablet 2   • Palbociclib 100 MG tablet tablet Take 1 tablet by mouth Daily. Take for 7 days on, then 7 days off. (Patient taking differently: Take 100 mg by mouth Take As Directed. Take for 7 days on, then 7 days off.) 14 tablet 6   • QUEtiapine (SEROquel) 50 MG tablet Take 1 tablet by mouth Every Night. 90 tablet 0   • sertraline (ZOLOFT) 100 MG tablet Take 1.5 tablets by mouth Daily. (Patient taking differently: Take 150 mg by mouth Every Night.) 135 tablet 0   • vitamin B-12 (CYANOCOBALAMIN) 1000 MCG tablet Take 1,000 mcg by mouth Daily.     • vitamin D (ERGOCALCIFEROL) 39879 units capsule capsule Take 50,000 Units by mouth Every 7 (Seven) Days. WEDNESDAY       No current facility-administered medications on file prior to visit.       ALLERGIES:  No Known Allergies    LMP  (LMP Unknown)      Current Status 4/21/2022   ECOG score 1         Assessment & Plan     61 year old female with stage IV breast cancer first diagnosed in 2003 with omental metastases in 2019.  He disease has  been fairly well controlled on ibrance and faslodex for the past year with decerase in omental disease, lymphadenopathy, but progressive disease in L2 spinous process and possible the left clavicle and S2.  The lesion at C7 appears stable with similar suv on the last 2 PET scans.      I discussed with her the role for palliative radiation therapy to L2, in either 5 or 10 fractions.  I discussed possible acute side effects of fatigue, skin erythema, abdominal cramping or diarrhea.  I discussed possible late effects of small bowel obstruction or fracture.     She voiced understanding and wishes to proceed.  We will proceed with simulation today and begin her treatments in the next 1-2 weeks.  I will discuss her case at our peer review conference to determine if she may be a candidate for SBRT.      I personally spent greater than 30 minutes today assessing, managing, discussing and documenting my visit with the patient. That time includes review of records, imaging and pathology reports, obtaining my own history, performing a medically appropriate evaluation, counseling and educating the patient, discussing goals, logistics, alternatives and risks of my recommendations, surveillance options and potential outcomes. It also includes the time documenting the clinical information in the EMR and communicating my recommendations to the other involved physicians.                Thank you very much for allowing me to participate in the care of this very pleasant patient.    Sincerely,      Lou Sotomayor MD

## 2022-05-18 NOTE — PROGRESS NOTES
Chief Complaint  Metastatic lobular breast cancer (ER/AL positive, HER-2/tj negative), anemia secondary to CKD3, CHF, peripheral neuropathy, chemotherapy related nausea chemotherapy-induced myelosuppression    Subjective        History of Present Illness  The patient returns today in follow-up continuing currently on Faslodex and Ibrance.  She he is due today for cycle 7 day 1 Faslodex and is receiving Ibrance at a dose of 100 mg daily for 7 days on followed by 7 days off (just began a week on Ibrance 5/18/2022).  She is due today to also begin monthly Xgeva after receiving clearance from nephrology.  Patient continues on hemodialysis on Mondays and Fridays and does receive Procrit 20,000 units each day.  She has significant limitation in her mobility, does typically use a motorized scooter which she is in again today.  She has ongoing issues with anemia that is related to her ESRD but also related to myelosuppression from her chemotherapy and she has required intermittent transfusion support.  Most recently, she had a hemoglobin down to 6.4 on 4/21/2022 requiring transfusion and again down to 6.8 on 5/5/2022 requiring transfusion.  At the last visit, we reviewed PET scan from 4/19/2022 showing a mixed response with increase in activity and a left clavicular metastasis, new hypermetabolic lesion spinous process L2 however decrease in uptake involving the omental disease and stable disease around the left renal pelvis and ureter.  There was discussion regarding potential transition to IV chemotherapy with Taxol versus continuation of the current regimen and radiation to at least the L2 new bony metastasis.  Patient opted to continue Ibrance/Faslodex and pursue radiation and defer IV chemotherapy.  She did undergo MRI L-spine 5/3/2022 which confirmed metastatic lesion spinous process L2.  There is a questionable abnormality at S2 in regards to hemangioma versus metastasis.  Bone scan on 5/3/2022 was actually  "negative.  Patient was seen by radiation oncology and is awaiting word regarding recommendations for stereotactic treatment versus short course (5-10 fraction) palliative radiation.  Today she reports that she has had some mild pain in her low back.  She does not require any pain medication.  She notes that her fatigue is somewhat improved currently.  She has no other current complaints.       Objective   Vital Signs:   BP 95/44   Pulse 72   Temp 97.8 °F (36.6 °C) (Temporal)   Resp 16   Ht 170.2 cm (67.01\")   Wt (!) 160 kg (352 lb) Comment: [place]  SpO2 97%   BMI 55.12 kg/m²     Physical Exam  Constitutional:       Appearance: She is well-developed. She is obese.      Comments: Patient is in a motorized scooter today   Eyes:      Conjunctiva/sclera: Conjunctivae normal.   Neck:      Thyroid: No thyromegaly.   Cardiovascular:      Rate and Rhythm: Normal rate and regular rhythm.      Heart sounds: Murmur heard.     No friction rub. No gallop.      Comments: 2/6 murmur  Pulmonary:      Effort: No respiratory distress.      Breath sounds: Normal breath sounds.      Comments: Dialysis access in place in the right subclavian position  Chest:   Breasts:      Right: No supraclavicular adenopathy.      Left: No supraclavicular adenopathy.       Abdominal:      General: Bowel sounds are normal. There is no distension.      Palpations: Abdomen is soft.      Tenderness: There is no abdominal tenderness.   Musculoskeletal:         General: Swelling present.      Comments: 1+ bilateral lower extremity edema with venous stasis changes noted.   Lymphadenopathy:      Head:      Right side of head: No submandibular adenopathy.      Cervical: No cervical adenopathy.      Upper Body:      Right upper body: No supraclavicular adenopathy.      Left upper body: No supraclavicular adenopathy.   Skin:     General: Skin is warm and dry.      Findings: No bruising or rash.   Neurological:      Mental Status: She is alert and oriented " to person, place, and time.      Cranial Nerves: No cranial nerve deficit.      Motor: No abnormal muscle tone.      Deep Tendon Reflexes: Reflexes normal.   Psychiatric:         Behavior: Behavior normal.        Patient was examined today, unchanged from above    Result Review : Reviewed CBC, CMP, magnesium, phosphorus from today.  Reviewed MRI lumbar spine 5/3/2022 and bone scan 5/3/2022.  Reviewed radiation oncology records.       Assessment and Plan    *Metastatic lobular breast cancer (ER/NE positive, HER-2/tj negative):  · Previous stage IIIC right breast cancer in 2003, received neoadjuvant chemotherapy (unknown regimen) with subsequent bilateral mastectomies 1/22/2004 with right axillary dissection.  Residual 10-12 cm invasive lobular carcinoma on the right breast with 14/16+ nodes with extranodal extension.  Received adjuvant carboplatin and Navelbine chemotherapy x4 cycles  · Attempted adjuvant radiation to the chest wall however interrupted due to cellulitis that required removal of implants in August 2004  · Received tamoxifen from 6/22/2004 until June 2009.  Transitioned to Femara and received until June 2014  · Identification of CT findings concerning for carcinomatosis on CT scans and June 2019.  · PET scan confirmed hypermetabolic activity in the omentum as well as small retroperitoneal lymph nodes.  · CT-guided omental biopsy 7/30/2019 with metastatic lobular carcinoma of breast primary, ER positive (greater than 95%), NE positive (90%), HER-2/tj negative (1+ IHC)  · [facility] liquid biopsy 2/5/2020: MSI undetermined, mutations in BETH, NF1, CHEK2.  No evidence of PIK3CA mutation.  · Subsequent [facility] germline testing 11/12/2021 with BETH VUS (c.2864C>A) and POLE VUS (c.631A>T)  · Initiation of Aromasin and Ibrance in August 2019  · Difficulty with myelosuppression related to Ibrance requiring dose alterations, eventually changed to 100 mg for 7 days on followed by 7 days off.  · CT chest  abdomen pelvis 10/26/2021 with increase in left hydronephrosis with increase in left perinephric and periureteral stranding. Decrease in stone in the left kidney lower pole (7 mm).  Stable small retroperitoneal lymph and left periaortic lymph nodes.  · PET scan 11/10/2021 with multiple bilateral hypermetabolic nodular pulmonary lesions, asymmetric moderate to severe left hydronephrosis with ill-defined fat stranding and soft tissue thickening in the renal sinus and surrounding the left ureter, hypermetabolic left retroperitoneal lymph node with slight increase in size, ill-defined hypermetabolic thickening in the inferior omentum with increase in size, uptake and C7 (indeterminate, recommended MRI).  Short segments of uptake in the mid and distal esophagus possibly related to gastritis.  · PET scan findings felt to be consistent with progressive malignancy.  Patient declined repeat omental biopsy.   · Options for further treatment discussed on 11/12/2021.  In light of renal failure and dialysis, options are more limited.  Recommendation to continue Ibrance and discontinue Aromasin, begin Faslodex.  Discussed possible future use of single agent Taxol.    · Aromasin discontinued 11/12/2021  · On 11/22/2021 initiation of Faslodex with continuation of Ibrance (100 mg daily for 7 days on followed by 7 days off).  · MRI cervical spine 11/18/2021 showed the right C7 normality to likely represent metastatic disease.  With solitary bone lesion, did not recommend pursuit of bone modifying agent.  · Following 2 cycles Faslodex/Ibrance, PET scan on 1/11/2022 showed no change in the soft tissue superior to the dome of the bladder with hypermetabolic activity (likely related to carcinomatosis).  Significant decrease in injection of fat around the left renal pelvis and stable retroperitoneal hypermetabolic lymph nodes, decrease in size and activity in small bilateral pulmonary nodules.  Findings felt to represent evidence of  response to treatment.    · Ibrance held briefly beginning 1/28/2022 due to hospitalization with COVID-19 infection and C. difficile colitis.  Patient developed leukopenia/neutropenia.  Ibrance resumed at previous dosing (100 mg daily 7 days on followed by 7 days off) on 2/9/22.  · Following 5 cycles Faslodex/Ibrance PET scan 4/19/2022 with evidence of mixed response.  New hypermetabolic lesion spinous process L2 (SUV 5.1) and slight increase in activity left clavicular metastasis (SUV increased 4.6-8.1).  C7 hypermetabolic mixed lytic and blastic bone lesion was unchanged.  Decrease in uptake involving omental thickening.  Stable soft tissue thickening around the left renal pelvis and ureter.  Discussion again regarding potential pursuit of IV chemotherapy with Taxol versus continuation of Ibrance and Faslodex with radiation to sites of bony disease at L2, left clavicle, C7.  Patient opted to defer initiation of systemic chemotherapy and continue Ibrance/Faslodex with consideration of radiation.  · Initiated monthly Xgeva on 5/19/2022 (cleared with nephrology)  · Plans to initiate radiation to L2, left clavicle, C7 sites of bony metastatic disease beginning 5/23/2022.  · Ibrance will be held during radiation therapy beginning 5/22/2022.  · The patient returns today in follow-up continuing on Faslodex and Ibrance.  She is due for cycle 7-day 1 Faslodex 500 mg IM today.  She continues on Ibrance 100 mg daily 7 days on followed by 7 days off (started week of Ibrance on 5/18/2022).  She is scheduled to start monthly Xgeva today.  I did discuss this with Dr. Antonio in nephrology at the last visit and he felt this was an acceptable treatment on dialysis.  We did discuss potential side effects of Xgeva today including electrolyte disturbances and osteonecrosis of the jaw.  The patient did see Dr. Sotomayor in radiation oncology in the interval.  I did contact her today and she had plans to begin treatment of the L2 lesion  on 5/23/2022 for 10 fractions.  She plans to obtain MRI cervical spine and add treatment to the C7 lesion and left clavicle as well.  I did asked the patient to begin holding Ibrance on 5/22/2022 and we will discuss timeframe to resume Ibrance pending completion date of radiation therapy.  She will proceed today as planned with Faslodex and Xgeva.  She is having some low back pain and I believe is symptomatic from the L2 metastasis at this point.  She does not require any pain medication by her report.  She will return here in 2 weeks for nurse practitioner visit and CBC, CMP.  I will see her in 4 weeks when she is again due for Faslodex (cycle 8) and Xgeva.    *Anemia secondary to ESRD, exacerbated by Ibrance:  · Anemia secondary to ESRD, malignancy with exacerbation by use of Ibrance  · Previous evidence of folate deficiency 8/12/2019 with level 3.84, initiated folic acid 1 mg daily  · Previous evidence of iron deficiency, received Injectafer on 8/7/2020 750 mg IV x1 dose.  Second dose not administered due to onset of fever, subsequent iron studies precluded further administration of IV iron.  · Previous low normal B12 level initiated oral B12 1000 mcg daily.  · Patient had previously received Procrit and required intermittent transfusion support  · Following initiation of hemodialysis, management of BEAU transitioned to nephrology, receiving Epogen with dialysis.  · Ongoing transfusion support prompted alteration in Ibrance dosing on 8/23/2021.  · After alteration in Ibrance dosing, hemoglobin improved and remained stable in the 9-10 range.  · Improved but ongoing intermittent need for transfusion support despite Ibrance dose alteration  · Stool negative for occult blood x3 on 11/2/2021  · Patient with reduced dialysis frequency to 2 days/week with concurrent decrease in Epogen dosing corresponding again to increased transfusion requirement.  · Epogen dose increased per nephrology to 20,000 units twice weekly with  dialysis on Mondays and Fridays  · Ibrance again exacerbating anemia with ongoing intermittent transfusion requirements  · Additional transfusions required with hemoglobin on 3/31/2022 6.6, hemoglobin 4/21/2022 of 6.4, hemoglobin on 5/5/2022 of 6.8.  · Patient returns today with hemoglobin that is improved considerably up to 9.0.  We will recheck in 2 weeks and again in 4 weeks with additional transfusion support for hemoglobin less than 7.  Patient confirms that she is continuing on Epogen 20,000 units Mondays and Fridays with dialysis as well as IV iron on Mondays.    *ESRD on HD:  · Patient with previous CKD3/4  · Hospitalization 4/29-5/4/2021 with RYAN/CKD, UTI, anemia.  Required initiation of hemodialysis Tuesday Thursday Saturday.   · Decrease in frequency of dialysis to twice per week.  She continues to produce a significant amount of urine.    · Status post left upper extremity AV fistula placement on 11/3/2021 by vascular surgery  · Attempt to hold further dialysis on 1/11/2022 with close monitoring of her laboratory and clinical parameters.  Dialysis quickly resumed due to development of hyperkalemia.  · Patient continues on dialysis 2 days/week on Mondays and Fridays.  Per patient report potential future consideration to hold dialysis again in the future.  Patient is followed closely by Dr. Antonio.      *CHF with mild to moderate aortic stenosis:  · Echocardiogram 7/12/2019 showing ejection fraction 48% with moderate dilation of the LV cavity, global hypokinesis, grade 2 diastolic dysfunction, mild to moderate aortic stenosis, trivial pericardial effusion.  · Echocardiogram 7/19/2021 with ejection fraction 56%, left atrial volume moderately increased, grade 2 diastolic dysfunction, severe calcification aortic valve, moderate aortic stenosis, severe mitral calcification, mild mitral stenosis, marked elevation in RVSP from tricuspid regurgitation.  · Patient notes that she discussed echo results with  cardiology and valvular status was felt to be stable in regards to aortic stenosis.    *Left upper and bilateral lower extremity peripheral neuropathy:  · Patient reports that left upper extremity neuropathy was not present from previous chemotherapy but occurred after hospitalization that required intubation and critical care stay.  Apparently felt to represent critical care neuropathy.  Improved over time.  · Bilateral lower extremity neuropathy felt to be related to diabetes  · May have future implications regarding treatment for breast cancer.  · Patient reports that peripheral neuropathy symptoms continue in the bilateral lower extremities, unchanged.  This will be a consideration with potential future use of Taxol    *Uterine/endometrial activity on PET scan:  · Patient experienced postmenopausal vaginal bleeding in 2013, negative endometrial biopsy and gynecologic evaluation.  · With findings on PET scan with enlarging uterus and some hypermetabolic activity, referred back to Dr. Oliveros in gynecology.    · 9/19/2019 D&C with hysteroscopy by Dr. Oliveros, no significant intraoperative findings, pathologic results with benign findings, no evidence of malignancy.    *Nausea:  · Mild, relieved with Zofran.   · Patient experienced improvement in nausea after initiating hemodialysis  · No recent nausea    *Myelosuppression secondary to Ibrance:  · WBC during hospitalization 1/28-1/30/2022 declined into the 1.9-2.0 range due to concurrent COVID-19 infection.  Ibrance was held briefly.  · Ibrance resumed on 2/9/2022 with WBC up to 4.49, ANC 3.07  · Today, WBC 3.42, ANC 2.08    *Depression  · Patient with history of depression, worsened after the death of her son in November 2021  · With evidence of progressive malignancy in November 2021, patient agreeable to referral to supportive oncology  · Patient was seen in supportive oncology 11/18/2021, Zoloft increased from 50 up to 100 mg daily.  Prescribed armodafinil 50 mg  daily however there or issues with insurance coverage  · Patient does continue with difficulties regarding depression that wax and wane    *Left perinephric stranding secondary to malignancy with hydronephrosis:  · CT 4/22/2021 showed interval enlargement of 2 staghorn calculi in the left kidney with persistent left perinephric stranding  · Hospitalization 4/29-5/4/2021 with RYAN/CKD, UTI, anemia.  Required 2 unit PRBC transfusion and initiated hemodialysis Tuesday Thursday Saturday.    · Discussion from urology regarding potential need for surgical procedure to address staghorn calculus left kidney  · CT scan 7/2/2021 with only 1 remaining left renal stone identified which had decreased in size.  Patient appears to have passed the other stone.  · As above, CT 10/26/2021 with more prominent left hydronephrosis and increase in left perinephric and periureteral stranding.  Felt to possibly be related to passage of recent stone versus partial obstruction secondary to debris or thrombus in the left ureter versus pyelonephritis.  Patient however with no clinical evidence of recent stone passage nor pyelonephritis. Malignancy felt to be the cause of CT changes around the left kidney at this point.   · Left ureteral stent replacement 12/16/2021  · PET scan 1/11/2022 with decrease in fat injection near left renal pelvis, stent in satisfactory position.  Mild residual hydronephrosis on the left.  · Ureteral stent replaced on 3/10/2022  · PET scan 4/19/2022 with no hydronephrosis, left ureteral stent in place    *Right thyroid nodules  · Patient underwent prior thyroid biopsy by her report with benign findings many years ago, records not available  · On MRI cervical spine 11/18/2021, finding of 1.8 cm right thyroid nodule  · Thyroid ultrasound 11/26/2021 with right-sided thyroid nodules, 1 nodule was hypoechoic, solid measuring 2 cm with biopsy recommended.  · Thyroid ultrasound results reviewed with patient.  In light of her  metastatic breast cancer, renal failure the significance of the thyroid nodule is felt to be much less.  We discussed possibility of thyroid biopsy however patient is inclined to forego this, especially since she has had a biopsy performed in the past with benign findings by her report.    · No hypermetabolic activity identified on PET scan 1/11/2022 in the neck    *Hospitalization 1/28-1/30/2022 due to COVID-19 infection and C. difficile colitis  · Patient fully vaccinated with 3 doses Moderna COVID-19 vaccine  · Patient developed symptoms 1/26/2022 with abrupt onset sinus congestion, mild cough, subsequently developed nausea and diarrhea on 1/27/2022.  Significant fatigue.  · Patient tested positive in emergency department on 1/28/2022 and was admitted  · Patient maintained O2 saturation in mid 90% range on room air  · Patient received remdesivir  · Patient was also found to be positive for C. difficile colitis, received course of oral vancomycin.  · Symptoms from both COVID-19 and C. difficile colitis ultimately resolved.    Plan:  1. Proceed with cycle 7 -day 1 Faslodex 500 mg IM injection today  2. Continue Ibrance 100 mg daily for 7 days on followed by 7 days off (patient initiated current week of Ibrance on 5/18/2022).  3. Begin monthly Xgeva today  4. No current need for transfusion  5. Continue oral folic acid 1 mg daily, B12 1000 mcg daily.  6. The patient is continuing on hemodialysis every Monday and Friday and is receiving Epogen 20,000 units with each dialysis per nephrology.  Patient is also receiving IV iron every Monday with dialysis.  7. Patient is aware that if she has an attempted break from hemodialysis she would need to continue on BEAU at least weekly either with nephrology or through our office with Procrit.  8. Plans per radiation oncology to begin treatment to L2 metastasis on 5/23/2020 2x10 fractions.  Note plans also to undergo MRI cervical spine and add in treatment to C7 and possibly  left clavicle.  9. Patient is aware to hold Ibrance beginning 5/22/2022 prior to radiation therapy and we will discuss timeframe to resume Ibrance following completion of radiation.  10. In 2 weeks nurse practitioner visit with CBC, CMP  11. In 4 weeks MD visit with CBC, CMP, magnesium, phosphorus, CA 15-3.  Patient will be due for cycle 8-day 1 Faslodex and monthly Xgeva as well as continuation of Ibrance.  We will review timeframe for completion of radiation and discuss timing of follow-up scans.    Patient continues on high risk medication requiring intensive monitoring.    I did spend 40 minutes in total time caring for patient today, time spent in review of records, face-to-face consultation, coordination of care, placement of orders, completion of documentation.

## 2022-05-19 ENCOUNTER — OFFICE VISIT (OUTPATIENT)
Dept: ONCOLOGY | Facility: CLINIC | Age: 62
End: 2022-05-19

## 2022-05-19 ENCOUNTER — INFUSION (OUTPATIENT)
Dept: ONCOLOGY | Facility: HOSPITAL | Age: 62
End: 2022-05-19

## 2022-05-19 ENCOUNTER — LAB (OUTPATIENT)
Dept: LAB | Facility: HOSPITAL | Age: 62
End: 2022-05-19

## 2022-05-19 VITALS
BODY MASS INDEX: 45.99 KG/M2 | WEIGHT: 293 LBS | HEART RATE: 72 BPM | RESPIRATION RATE: 16 BRPM | DIASTOLIC BLOOD PRESSURE: 44 MMHG | OXYGEN SATURATION: 97 % | SYSTOLIC BLOOD PRESSURE: 95 MMHG | TEMPERATURE: 97.8 F | HEIGHT: 67 IN

## 2022-05-19 DIAGNOSIS — C50.919 METASTATIC BREAST CANCER: ICD-10-CM

## 2022-05-19 DIAGNOSIS — C50.919 METASTATIC BREAST CANCER: Primary | ICD-10-CM

## 2022-05-19 LAB
ALBUMIN SERPL-MCNC: 3.8 G/DL (ref 3.5–5.2)
ALBUMIN/GLOB SERPL: 1.1 G/DL (ref 1.1–2.4)
ALP SERPL-CCNC: 92 U/L (ref 38–116)
ALT SERPL W P-5'-P-CCNC: 18 U/L (ref 0–33)
ANION GAP SERPL CALCULATED.3IONS-SCNC: 7.6 MMOL/L (ref 5–15)
AST SERPL-CCNC: 27 U/L (ref 0–32)
BASOPHILS # BLD AUTO: 0.09 10*3/MM3 (ref 0–0.2)
BASOPHILS NFR BLD AUTO: 2.6 % (ref 0–1.5)
BILIRUB SERPL-MCNC: 0.3 MG/DL (ref 0.2–1.2)
BUN SERPL-MCNC: 44 MG/DL (ref 6–20)
BUN/CREAT SERPL: 9.3 (ref 7.3–30)
CALCIUM SPEC-SCNC: 9 MG/DL (ref 8.5–10.2)
CHLORIDE SERPL-SCNC: 105 MMOL/L (ref 98–107)
CO2 SERPL-SCNC: 26.4 MMOL/L (ref 22–29)
CREAT SERPL-MCNC: 4.73 MG/DL (ref 0.6–1.1)
DEPRECATED RDW RBC AUTO: 74.1 FL (ref 37–54)
EGFRCR SERPLBLD CKD-EPI 2021: 9.8 ML/MIN/1.73
EOSINOPHIL # BLD AUTO: 0.19 10*3/MM3 (ref 0–0.4)
EOSINOPHIL NFR BLD AUTO: 5.6 % (ref 0.3–6.2)
ERYTHROCYTE [DISTWIDTH] IN BLOOD BY AUTOMATED COUNT: 18.9 % (ref 12.3–15.4)
GLOBULIN UR ELPH-MCNC: 3.4 GM/DL (ref 1.8–3.5)
GLUCOSE SERPL-MCNC: 125 MG/DL (ref 74–124)
HCT VFR BLD AUTO: 29.6 % (ref 34–46.6)
HGB BLD-MCNC: 9 G/DL (ref 12–15.9)
IMM GRANULOCYTES # BLD AUTO: 0.05 10*3/MM3 (ref 0–0.05)
IMM GRANULOCYTES NFR BLD AUTO: 1.5 % (ref 0–0.5)
LYMPHOCYTES # BLD AUTO: 0.63 10*3/MM3 (ref 0.7–3.1)
LYMPHOCYTES NFR BLD AUTO: 18.4 % (ref 19.6–45.3)
MAGNESIUM SERPL-MCNC: 1.9 MG/DL (ref 1.8–2.5)
MCH RBC QN AUTO: 32.1 PG (ref 26.6–33)
MCHC RBC AUTO-ENTMCNC: 30.4 G/DL (ref 31.5–35.7)
MCV RBC AUTO: 105.7 FL (ref 79–97)
MONOCYTES # BLD AUTO: 0.38 10*3/MM3 (ref 0.1–0.9)
MONOCYTES NFR BLD AUTO: 11.1 % (ref 5–12)
NEUTROPHILS NFR BLD AUTO: 2.08 10*3/MM3 (ref 1.7–7)
NEUTROPHILS NFR BLD AUTO: 60.8 % (ref 42.7–76)
NRBC BLD AUTO-RTO: 0 /100 WBC (ref 0–0.2)
PHOSPHATE SERPL-MCNC: 4.5 MG/DL (ref 2.5–4.5)
PLATELET # BLD AUTO: 156 10*3/MM3 (ref 140–450)
PMV BLD AUTO: 8.5 FL (ref 6–12)
POTASSIUM SERPL-SCNC: 5.3 MMOL/L (ref 3.5–4.7)
PROT SERPL-MCNC: 7.2 G/DL (ref 6.3–8)
RBC # BLD AUTO: 2.8 10*6/MM3 (ref 3.77–5.28)
SODIUM SERPL-SCNC: 139 MMOL/L (ref 134–145)
WBC NRBC COR # BLD: 3.42 10*3/MM3 (ref 3.4–10.8)

## 2022-05-19 PROCEDURE — 84100 ASSAY OF PHOSPHORUS: CPT

## 2022-05-19 PROCEDURE — 99215 OFFICE O/P EST HI 40 MIN: CPT | Performed by: INTERNAL MEDICINE

## 2022-05-19 PROCEDURE — 25010000002 DENOSUMAB 120 MG/1.7ML SOLUTION: Performed by: INTERNAL MEDICINE

## 2022-05-19 PROCEDURE — 96372 THER/PROPH/DIAG INJ SC/IM: CPT

## 2022-05-19 PROCEDURE — 85025 COMPLETE CBC W/AUTO DIFF WBC: CPT

## 2022-05-19 PROCEDURE — 36415 COLL VENOUS BLD VENIPUNCTURE: CPT

## 2022-05-19 PROCEDURE — 96402 CHEMO HORMON ANTINEOPL SQ/IM: CPT

## 2022-05-19 PROCEDURE — 25010000002 FULVESTRANT PER 25 MG: Performed by: INTERNAL MEDICINE

## 2022-05-19 PROCEDURE — 83735 ASSAY OF MAGNESIUM: CPT

## 2022-05-19 PROCEDURE — 80053 COMPREHEN METABOLIC PANEL: CPT

## 2022-05-19 RX ADMIN — DENOSUMAB 120 MG: 120 INJECTION SUBCUTANEOUS at 13:22

## 2022-05-19 RX ADMIN — FULVESTRANT 500 MG: 250 INJECTION INTRAMUSCULAR at 13:22

## 2022-05-20 DIAGNOSIS — C50.919 METASTATIC BREAST CANCER: Primary | ICD-10-CM

## 2022-05-23 ENCOUNTER — HOSPITAL ENCOUNTER (OUTPATIENT)
Dept: MRI IMAGING | Facility: HOSPITAL | Age: 62
Discharge: HOME | End: 2022-05-23
Admitting: RADIOLOGY

## 2022-05-23 DIAGNOSIS — C50.919 METASTATIC BREAST CANCER: ICD-10-CM

## 2022-05-23 LAB — CREAT BLDA-MCNC: 5.4 MG/DL (ref 0.6–1.3)

## 2022-05-23 PROCEDURE — 77301 RADIOTHERAPY DOSE PLAN IMRT: CPT | Performed by: RADIOLOGY

## 2022-05-23 PROCEDURE — 77338 DESIGN MLC DEVICE FOR IMRT: CPT | Performed by: RADIOLOGY

## 2022-05-23 PROCEDURE — 82565 ASSAY OF CREATININE: CPT

## 2022-05-23 PROCEDURE — 0 GADOBUTROL 1 MMOL/ML SOLUTION: Performed by: RADIOLOGY

## 2022-05-23 PROCEDURE — 72156 MRI NECK SPINE W/O & W/DYE: CPT

## 2022-05-23 PROCEDURE — A9585 GADOBUTROL INJECTION: HCPCS | Performed by: RADIOLOGY

## 2022-05-23 RX ADMIN — GADOBUTROL 10 ML: 604.72 INJECTION INTRAVENOUS at 12:21

## 2022-05-24 ENCOUNTER — TELEPHONE (OUTPATIENT)
Dept: ONCOLOGY | Facility: CLINIC | Age: 62
End: 2022-05-24

## 2022-05-24 ENCOUNTER — DOCUMENTATION (OUTPATIENT)
Dept: PHARMACY | Facility: HOSPITAL | Age: 62
End: 2022-05-24

## 2022-05-24 LAB
RAD ONC ARIA COURSE ID: NORMAL
RAD ONC ARIA COURSE INTENT: NORMAL
RAD ONC ARIA COURSE LAST TREATMENT DATE: NORMAL
RAD ONC ARIA COURSE START DATE: NORMAL
RAD ONC ARIA COURSE TREATMENT ELAPSED DAYS: 0
RAD ONC ARIA FIRST TREATMENT DATE: NORMAL
RAD ONC ARIA PLAN FRACTIONS TREATED TO DATE: 1
RAD ONC ARIA PLAN ID: NORMAL
RAD ONC ARIA PLAN PRESCRIBED DOSE PER FRACTION: 3 GY
RAD ONC ARIA PLAN PRIMARY REFERENCE POINT: NORMAL
RAD ONC ARIA PLAN TOTAL FRACTIONS PRESCRIBED: 10
RAD ONC ARIA PLAN TOTAL PRESCRIBED DOSE: 3000 CGY
RAD ONC ARIA REFERENCE POINT DOSAGE GIVEN TO DATE: 3 GY
RAD ONC ARIA REFERENCE POINT DOSAGE GIVEN TO DATE: 3.06 GY
RAD ONC ARIA REFERENCE POINT ID: NORMAL
RAD ONC ARIA REFERENCE POINT ID: NORMAL
RAD ONC ARIA REFERENCE POINT SESSION DOSAGE GIVEN: 3 GY
RAD ONC ARIA REFERENCE POINT SESSION DOSAGE GIVEN: 3.06 GY

## 2022-05-24 PROCEDURE — 77386: CPT | Performed by: RADIOLOGY

## 2022-05-24 PROCEDURE — 77386 CHG INTENSITY MODULATED RADIATION TX DLVR COMPLEX: CPT | Performed by: RADIOLOGY

## 2022-05-24 PROCEDURE — 77427 RADIATION TX MANAGEMENT X5: CPT | Performed by: RADIOLOGY

## 2022-05-24 PROCEDURE — 77300 RADIATION THERAPY DOSE PLAN: CPT | Performed by: RADIOLOGY

## 2022-05-24 PROCEDURE — 77014 CHG CT GUIDANCE RADIATION THERAPY FLDS PLACEMENT: CPT | Performed by: RADIOLOGY

## 2022-05-24 NOTE — PROGRESS NOTES
I received a fax for a PA on Ibrance from [facility]. I filled out the form, had Dr Irvin sign it, and faxed it in. Waiting on response.

## 2022-05-24 NOTE — TELEPHONE ENCOUNTER
Caller: JESSIKA    Relationship: [facility]    Best call back number: [phone]    What was the call regarding: FACILITY CALLED TO DISCUSS THE PRIOR AUTH FOR PATIENTS IBRANCE MEDICATION     PA # [id number]    Do you require a callback: YES

## 2022-05-24 NOTE — PROGRESS NOTES
I called [facility] back and spoke with Christine BENZ. I had missed answering a question on the PA form. I gave her the answer and she submitted it to the clinical pharmacists. Waiting on response.

## 2022-05-25 LAB
RAD ONC ARIA COURSE ID: NORMAL
RAD ONC ARIA COURSE INTENT: NORMAL
RAD ONC ARIA COURSE LAST TREATMENT DATE: NORMAL
RAD ONC ARIA COURSE START DATE: NORMAL
RAD ONC ARIA COURSE TREATMENT ELAPSED DAYS: 1
RAD ONC ARIA FIRST TREATMENT DATE: NORMAL
RAD ONC ARIA PLAN FRACTIONS TREATED TO DATE: 2
RAD ONC ARIA PLAN ID: NORMAL
RAD ONC ARIA PLAN PRESCRIBED DOSE PER FRACTION: 3 GY
RAD ONC ARIA PLAN PRIMARY REFERENCE POINT: NORMAL
RAD ONC ARIA PLAN TOTAL FRACTIONS PRESCRIBED: 10
RAD ONC ARIA PLAN TOTAL PRESCRIBED DOSE: 3000 CGY
RAD ONC ARIA REFERENCE POINT DOSAGE GIVEN TO DATE: 6 GY
RAD ONC ARIA REFERENCE POINT DOSAGE GIVEN TO DATE: 6.12 GY
RAD ONC ARIA REFERENCE POINT ID: NORMAL
RAD ONC ARIA REFERENCE POINT ID: NORMAL
RAD ONC ARIA REFERENCE POINT SESSION DOSAGE GIVEN: 3 GY
RAD ONC ARIA REFERENCE POINT SESSION DOSAGE GIVEN: 3.06 GY

## 2022-05-25 PROCEDURE — 77014 CHG CT GUIDANCE RADIATION THERAPY FLDS PLACEMENT: CPT | Performed by: RADIOLOGY

## 2022-05-25 PROCEDURE — 77386: CPT | Performed by: RADIOLOGY

## 2022-05-25 PROCEDURE — 77386 CHG INTENSITY MODULATED RADIATION TX DLVR COMPLEX: CPT | Performed by: RADIOLOGY

## 2022-05-26 LAB
RAD ONC ARIA COURSE ID: NORMAL
RAD ONC ARIA COURSE INTENT: NORMAL
RAD ONC ARIA COURSE LAST TREATMENT DATE: NORMAL
RAD ONC ARIA COURSE START DATE: NORMAL
RAD ONC ARIA COURSE TREATMENT ELAPSED DAYS: 2
RAD ONC ARIA FIRST TREATMENT DATE: NORMAL
RAD ONC ARIA PLAN FRACTIONS TREATED TO DATE: 3
RAD ONC ARIA PLAN ID: NORMAL
RAD ONC ARIA PLAN PRESCRIBED DOSE PER FRACTION: 3 GY
RAD ONC ARIA PLAN PRIMARY REFERENCE POINT: NORMAL
RAD ONC ARIA PLAN TOTAL FRACTIONS PRESCRIBED: 10
RAD ONC ARIA PLAN TOTAL PRESCRIBED DOSE: 3000 CGY
RAD ONC ARIA REFERENCE POINT DOSAGE GIVEN TO DATE: 9 GY
RAD ONC ARIA REFERENCE POINT DOSAGE GIVEN TO DATE: 9.18 GY
RAD ONC ARIA REFERENCE POINT ID: NORMAL
RAD ONC ARIA REFERENCE POINT ID: NORMAL
RAD ONC ARIA REFERENCE POINT SESSION DOSAGE GIVEN: 3 GY
RAD ONC ARIA REFERENCE POINT SESSION DOSAGE GIVEN: 3.06 GY

## 2022-05-26 PROCEDURE — 77386: CPT | Performed by: RADIOLOGY

## 2022-05-26 PROCEDURE — 77386 CHG INTENSITY MODULATED RADIATION TX DLVR COMPLEX: CPT | Performed by: RADIOLOGY

## 2022-05-26 PROCEDURE — 77014 CHG CT GUIDANCE RADIATION THERAPY FLDS PLACEMENT: CPT | Performed by: RADIOLOGY

## 2022-05-27 DIAGNOSIS — R53.83 FATIGUE, UNSPECIFIED TYPE: ICD-10-CM

## 2022-05-27 DIAGNOSIS — F41.9 ANXIETY AND DEPRESSION: ICD-10-CM

## 2022-05-27 DIAGNOSIS — G47.33 OSA (OBSTRUCTIVE SLEEP APNEA): ICD-10-CM

## 2022-05-27 DIAGNOSIS — F32.A ANXIETY AND DEPRESSION: ICD-10-CM

## 2022-05-27 LAB
RAD ONC ARIA COURSE ID: NORMAL
RAD ONC ARIA COURSE INTENT: NORMAL
RAD ONC ARIA COURSE LAST TREATMENT DATE: NORMAL
RAD ONC ARIA COURSE START DATE: NORMAL
RAD ONC ARIA COURSE TREATMENT ELAPSED DAYS: 3
RAD ONC ARIA FIRST TREATMENT DATE: NORMAL
RAD ONC ARIA PLAN FRACTIONS TREATED TO DATE: 4
RAD ONC ARIA PLAN ID: NORMAL
RAD ONC ARIA PLAN PRESCRIBED DOSE PER FRACTION: 3 GY
RAD ONC ARIA PLAN PRIMARY REFERENCE POINT: NORMAL
RAD ONC ARIA PLAN TOTAL FRACTIONS PRESCRIBED: 10
RAD ONC ARIA PLAN TOTAL PRESCRIBED DOSE: 3000 CGY
RAD ONC ARIA REFERENCE POINT DOSAGE GIVEN TO DATE: 12 GY
RAD ONC ARIA REFERENCE POINT DOSAGE GIVEN TO DATE: 12.24 GY
RAD ONC ARIA REFERENCE POINT ID: NORMAL
RAD ONC ARIA REFERENCE POINT ID: NORMAL
RAD ONC ARIA REFERENCE POINT SESSION DOSAGE GIVEN: 3 GY
RAD ONC ARIA REFERENCE POINT SESSION DOSAGE GIVEN: 3.06 GY

## 2022-05-27 PROCEDURE — 77386 CHG INTENSITY MODULATED RADIATION TX DLVR COMPLEX: CPT | Performed by: RADIOLOGY

## 2022-05-27 PROCEDURE — 77386: CPT | Performed by: RADIOLOGY

## 2022-05-27 PROCEDURE — 77014 CHG CT GUIDANCE RADIATION THERAPY FLDS PLACEMENT: CPT | Performed by: RADIOLOGY

## 2022-05-31 ENCOUNTER — RADIATION ONCOLOGY WEEKLY ASSESSMENT (OUTPATIENT)
Dept: RADIATION ONCOLOGY | Facility: HOSPITAL | Age: 62
End: 2022-05-31
Payer: COMMERCIAL

## 2022-05-31 VITALS
SYSTOLIC BLOOD PRESSURE: 141 MMHG | BODY MASS INDEX: 56.61 KG/M2 | WEIGHT: 293 LBS | HEART RATE: 77 BPM | DIASTOLIC BLOOD PRESSURE: 82 MMHG | RESPIRATION RATE: 20 BRPM | OXYGEN SATURATION: 96 %

## 2022-05-31 DIAGNOSIS — C50.919 PRIMARY MALIGNANT NEOPLASM OF BREAST WITH METASTASIS: Primary | ICD-10-CM

## 2022-05-31 LAB
RAD ONC ARIA COURSE ID: NORMAL
RAD ONC ARIA COURSE INTENT: NORMAL
RAD ONC ARIA COURSE LAST TREATMENT DATE: NORMAL
RAD ONC ARIA COURSE START DATE: NORMAL
RAD ONC ARIA COURSE TREATMENT ELAPSED DAYS: 7
RAD ONC ARIA FIRST TREATMENT DATE: NORMAL
RAD ONC ARIA PLAN FRACTIONS TREATED TO DATE: 5
RAD ONC ARIA PLAN ID: NORMAL
RAD ONC ARIA PLAN PRESCRIBED DOSE PER FRACTION: 3 GY
RAD ONC ARIA PLAN PRIMARY REFERENCE POINT: NORMAL
RAD ONC ARIA PLAN TOTAL FRACTIONS PRESCRIBED: 10
RAD ONC ARIA PLAN TOTAL PRESCRIBED DOSE: 3000 CGY
RAD ONC ARIA REFERENCE POINT DOSAGE GIVEN TO DATE: 15 GY
RAD ONC ARIA REFERENCE POINT DOSAGE GIVEN TO DATE: 15.3 GY
RAD ONC ARIA REFERENCE POINT ID: NORMAL
RAD ONC ARIA REFERENCE POINT ID: NORMAL
RAD ONC ARIA REFERENCE POINT SESSION DOSAGE GIVEN: 3 GY
RAD ONC ARIA REFERENCE POINT SESSION DOSAGE GIVEN: 3.06 GY

## 2022-05-31 PROCEDURE — 77336 RADIATION PHYSICS CONSULT: CPT | Performed by: RADIOLOGY

## 2022-05-31 PROCEDURE — 77386: CPT | Performed by: RADIOLOGY

## 2022-05-31 PROCEDURE — 77386 CHG INTENSITY MODULATED RADIATION TX DLVR COMPLEX: CPT | Performed by: RADIOLOGY

## 2022-05-31 PROCEDURE — 77014 CHG CT GUIDANCE RADIATION THERAPY FLDS PLACEMENT: CPT | Performed by: RADIOLOGY

## 2022-06-01 ENCOUNTER — APPOINTMENT (OUTPATIENT)
Dept: RADIATION ONCOLOGY | Facility: HOSPITAL | Age: 62
End: 2022-06-01

## 2022-06-01 LAB
RAD ONC ARIA COURSE ID: NORMAL
RAD ONC ARIA COURSE INTENT: NORMAL
RAD ONC ARIA COURSE LAST TREATMENT DATE: NORMAL
RAD ONC ARIA COURSE START DATE: NORMAL
RAD ONC ARIA COURSE TREATMENT ELAPSED DAYS: 8
RAD ONC ARIA FIRST TREATMENT DATE: NORMAL
RAD ONC ARIA PLAN FRACTIONS TREATED TO DATE: 6
RAD ONC ARIA PLAN ID: NORMAL
RAD ONC ARIA PLAN PRESCRIBED DOSE PER FRACTION: 3 GY
RAD ONC ARIA PLAN PRIMARY REFERENCE POINT: NORMAL
RAD ONC ARIA PLAN TOTAL FRACTIONS PRESCRIBED: 10
RAD ONC ARIA PLAN TOTAL PRESCRIBED DOSE: 3000 CGY
RAD ONC ARIA REFERENCE POINT DOSAGE GIVEN TO DATE: 18 GY
RAD ONC ARIA REFERENCE POINT DOSAGE GIVEN TO DATE: 18.36 GY
RAD ONC ARIA REFERENCE POINT ID: NORMAL
RAD ONC ARIA REFERENCE POINT ID: NORMAL
RAD ONC ARIA REFERENCE POINT SESSION DOSAGE GIVEN: 3 GY
RAD ONC ARIA REFERENCE POINT SESSION DOSAGE GIVEN: 3.06 GY

## 2022-06-01 PROCEDURE — 77386: CPT | Performed by: RADIOLOGY

## 2022-06-01 PROCEDURE — 77427 RADIATION TX MANAGEMENT X5: CPT | Performed by: RADIOLOGY

## 2022-06-01 PROCEDURE — 77014 CHG CT GUIDANCE RADIATION THERAPY FLDS PLACEMENT: CPT | Performed by: RADIOLOGY

## 2022-06-01 PROCEDURE — 77386 CHG INTENSITY MODULATED RADIATION TX DLVR COMPLEX: CPT | Performed by: RADIOLOGY

## 2022-06-01 RX ORDER — SERTRALINE HYDROCHLORIDE 100 MG/1
TABLET, FILM COATED ORAL
Qty: 135 TABLET | Refills: 0 | Status: SHIPPED | OUTPATIENT
Start: 2022-06-01 | End: 2022-08-04 | Stop reason: SDUPTHER

## 2022-06-01 RX ORDER — ARMODAFINIL 250 MG/1
TABLET ORAL
Qty: 30 TABLET | Refills: 0 | Status: SHIPPED | OUTPATIENT
Start: 2022-06-01 | End: 2022-06-30 | Stop reason: SDUPTHER

## 2022-06-01 NOTE — PROGRESS NOTES
"Chief Complaint  Metastatic lobular breast cancer (ER/MA positive, HER-2/tj negative), anemia secondary to CKD3, CHF, peripheral neuropathy, chemotherapy related nausea chemotherapy-induced myelosuppression    Subjective        History of Present Illness  The patient returns today in short-term 2-week follow-up.  Most recent cycle of Ibrance (cycle 7) was initiated 518 beginning 5/22/2022 to hold therapy in preparation for radiation beginning 5/23/2022 to L2 metastasis per radiation oncology with plans to give 2 x 10 fractions.  She is on day 7 as of today.  She continues with low back pain not really changed since initiating radiation though manageable.     Patient continues on hemodialysis on Mondays and Fridays and does receive Procrit 20,000 units each day.  She has significant limitation in her mobility, using motorized scooter. She has ongoing issues with anemia that is related to her ESRD but also related to myelosuppression from her chemotherapy and she has required intermittent transfusion support.  Most recently, she had a hemoglobin down to 6.4 on 4/21/2022 requiring transfusion and again down to 6.8 on 5/5/2022 requiring transfusion.  Hemoglobin is more reasonable to 8.4.    She denies new concerns today.     Objective   Vital Signs:   /69   Pulse 66   Temp 97.1 °F (36.2 °C) (Temporal)   Resp 16   Ht 170.2 cm (67.01\")   Wt (!) 163 kg (359 lb 5.6 oz) Comment: states  SpO2 98%   BMI 56.27 kg/m²     Physical Exam  Constitutional:       Appearance: She is well-developed. She is obese.      Comments: Patient is in a motorized scooter today   Eyes:      Conjunctiva/sclera: Conjunctivae normal.   Neck:      Thyroid: No thyromegaly.   Cardiovascular:      Rate and Rhythm: Normal rate and regular rhythm.      Heart sounds: Murmur heard.     No friction rub. No gallop.      Comments: 2/6 murmur  Pulmonary:      Effort: No respiratory distress.      Breath sounds: Normal breath sounds.      Comments: " Dialysis access in place in the right subclavian position  Abdominal:      General: Bowel sounds are normal. There is no distension.      Palpations: Abdomen is soft.      Tenderness: There is no abdominal tenderness.   Musculoskeletal:         General: Swelling present.      Comments: 1+ bilateral lower extremity edema with venous stasis changes noted.   Skin:     General: Skin is warm and dry.      Findings: No bruising or rash.   Neurological:      Mental Status: She is alert and oriented to person, place, and time.      Cranial Nerves: No cranial nerve deficit.      Motor: No abnormal muscle tone.      Deep Tendon Reflexes: Reflexes normal.   Psychiatric:         Behavior: Behavior normal.        Patient was examined today, unchanged from above    Result Review :   Results from last 7 days   Lab Units 06/02/22  0929   WBC 10*3/mm3 3.22*   NEUTROS ABS 10*3/mm3 2.05   HEMOGLOBIN g/dL 8.4*   HEMATOCRIT % 28.4*   PLATELETS 10*3/mm3 167     Results from last 7 days   Lab Units 06/02/22  0929   SODIUM mmol/L 140   POTASSIUM mmol/L 4.4   CHLORIDE mmol/L 103   CO2 mmol/L 25.7   BUN mg/dL 42*   CREATININE mg/dL 4.33*   CALCIUM mg/dL 7.1*   ALBUMIN g/dL 3.60   BILIRUBIN mg/dL 0.3   ALK PHOS U/L 87   ALT (SGPT) U/L 21   AST (SGOT) U/L 24   GLUCOSE mg/dL 114                Assessment and Plan    *Metastatic lobular breast cancer (ER/NE positive, HER-2/tj negative):  · Previous stage IIIC right breast cancer in 2003, received neoadjuvant chemotherapy (unknown regimen) with subsequent bilateral mastectomies 1/22/2004 with right axillary dissection.  Residual 10-12 cm invasive lobular carcinoma on the right breast with 14/16+ nodes with extranodal extension.  Received adjuvant carboplatin and Navelbine chemotherapy x4 cycles  · Attempted adjuvant radiation to the chest wall however interrupted due to cellulitis that required removal of implants in August 2004  · Received tamoxifen from 6/22/2004 until June 2009.  Transitioned  to Femara and received until June 2014  · Identification of CT findings concerning for carcinomatosis on CT scans and June 2019.  · PET scan confirmed hypermetabolic activity in the omentum as well as small retroperitoneal lymph nodes.  · CT-guided omental biopsy 7/30/2019 with metastatic lobular carcinoma of breast primary, ER positive (greater than 95%), NM positive (90%), HER-2/tj negative (1+ IHC)  · [facility] liquid biopsy 2/5/2020: MSI undetermined, mutations in BETH, NF1, CHEK2.  No evidence of PIK3CA mutation.  · Subsequent [facility] germline testing 11/12/2021 with BETH VUS (c.2864C>A) and POLE VUS (c.631A>T)  · Initiation of Aromasin and Ibrance in August 2019  · Difficulty with myelosuppression related to Ibrance requiring dose alterations, eventually changed to 100 mg for 7 days on followed by 7 days off.  · CT chest abdomen pelvis 10/26/2021 with increase in left hydronephrosis with increase in left perinephric and periureteral stranding. Decrease in stone in the left kidney lower pole (7 mm).  Stable small retroperitoneal lymph and left periaortic lymph nodes.  · PET scan 11/10/2021 with multiple bilateral hypermetabolic nodular pulmonary lesions, asymmetric moderate to severe left hydronephrosis with ill-defined fat stranding and soft tissue thickening in the renal sinus and surrounding the left ureter, hypermetabolic left retroperitoneal lymph node with slight increase in size, ill-defined hypermetabolic thickening in the inferior omentum with increase in size, uptake and C7 (indeterminate, recommended MRI).  Short segments of uptake in the mid and distal esophagus possibly related to gastritis.  · PET scan findings felt to be consistent with progressive malignancy.  Patient declined repeat omental biopsy.   · Options for further treatment discussed on 11/12/2021.  In light of renal failure and dialysis, options are more limited.  Recommendation to continue Ibrance and discontinue Aromasin,  begin Faslodex.  Discussed possible future use of single agent Taxol.    · Aromasin discontinued 11/12/2021  · On 11/22/2021 initiation of Faslodex with continuation of Ibrance (100 mg daily for 7 days on followed by 7 days off).  · MRI cervical spine 11/18/2021 showed the right C7 normality to likely represent metastatic disease.  With solitary bone lesion, did not recommend pursuit of bone modifying agent.  · Following 2 cycles Faslodex/Ibrance, PET scan on 1/11/2022 showed no change in the soft tissue superior to the dome of the bladder with hypermetabolic activity (likely related to carcinomatosis).  Significant decrease in injection of fat around the left renal pelvis and stable retroperitoneal hypermetabolic lymph nodes, decrease in size and activity in small bilateral pulmonary nodules.  Findings felt to represent evidence of response to treatment.    · Ibrance held briefly beginning 1/28/2022 due to hospitalization with COVID-19 infection and C. difficile colitis.  Patient developed leukopenia/neutropenia.  Ibrance resumed at previous dosing (100 mg daily 7 days on followed by 7 days off) on 2/9/22.  · Following 5 cycles Faslodex/Ibrance PET scan 4/19/2022 with evidence of mixed response.  New hypermetabolic lesion spinous process L2 (SUV 5.1) and slight increase in activity left clavicular metastasis (SUV increased 4.6-8.1).  C7 hypermetabolic mixed lytic and blastic bone lesion was unchanged.  Decrease in uptake involving omental thickening.  Stable soft tissue thickening around the left renal pelvis and ureter.  Discussion again regarding potential pursuit of IV chemotherapy with Taxol versus continuation of Ibrance and Faslodex with radiation to sites of bony disease at L2, left clavicle, C7.  Patient opted to defer initiation of systemic chemotherapy and continue Ibrance/Faslodex with consideration of radiation.  · Initiated monthly Xgeva on 5/19/2022 (cleared with nephrology)  · Patient initiating  radiation to L2, left clavicle, C7 sites of bony metastatic disease 5/23/2022 (Ibrance held during radiation therapy beginning 5/22/2022).  · Per review today patient is currently on day 7 of 10 days of radiation scheduled.  She will complete radiation as of 6/7/2022.  She will see Dr. Irvin back in 2 weeks when she is due again for Faslodex (cycle 8) and Xgeva.  Pending counts, plan to resume Ibrance at that time.    *Anemia secondary to ESRD, exacerbated by Ibrance:  · Anemia secondary to ESRD, malignancy with exacerbation by use of Ibrance  · Previous evidence of folate deficiency 8/12/2019 with level 3.84, initiated folic acid 1 mg daily  · Previous evidence of iron deficiency, received Injectafer on 8/7/2020 750 mg IV x1 dose.  Second dose not administered due to onset of fever, subsequent iron studies precluded further administration of IV iron.  · Previous low normal B12 level initiated oral B12 1000 mcg daily.  · Patient had previously received Procrit and required intermittent transfusion support  · Following initiation of hemodialysis, management of BEAU transitioned to nephrology, receiving Epogen with dialysis.  · Ongoing transfusion support prompted alteration in Ibrance dosing on 8/23/2021.  · After alteration in Ibrance dosing, hemoglobin improved and remained stable in the 9-10 range.  · Improved but ongoing intermittent need for transfusion support despite Ibrance dose alteration  · Stool negative for occult blood x3 on 11/2/2021  · Patient with reduced dialysis frequency to 2 days/week with concurrent decrease in Epogen dosing corresponding again to increased transfusion requirement.  · Epogen dose increased per nephrology to 20,000 units twice weekly with dialysis on Mondays and Fridays  · Ibrance again exacerbating anemia with ongoing intermittent transfusion requirements  · Additional transfusions required with hemoglobin on 3/31/2022 6.6, hemoglobin 4/21/2022 of 6.4, hemoglobin on 5/5/2022 of  6.8.  · Patient returns today with hemoglobin that is 8.4.  We continue to monitor closely and transfuse if less than 7.  Patient confirms that she is continuing on Epogen 20,000 units Mondays and Fridays with dialysis as well as IV iron on Mondays.    *ESRD on HD:  · Patient with previous CKD3/4  · Hospitalization 4/29-5/4/2021 with RYAN/CKD, UTI, anemia.  Required initiation of hemodialysis Tuesday Thursday Saturday.   · Decrease in frequency of dialysis to twice per week.  She continues to produce a significant amount of urine.    · Status post left upper extremity AV fistula placement on 11/3/2021 by vascular surgery  · Attempt to hold further dialysis on 1/11/2022 with close monitoring of her laboratory and clinical parameters.  Dialysis quickly resumed due to development of hyperkalemia.  · Patient continues on dialysis 2 days/week on Mondays and Fridays.  Per patient report potential future consideration to hold dialysis again in the future.  Patient is followed closely by Dr. Antonio.      *CHF with mild to moderate aortic stenosis:  · Echocardiogram 7/12/2019 showing ejection fraction 48% with moderate dilation of the LV cavity, global hypokinesis, grade 2 diastolic dysfunction, mild to moderate aortic stenosis, trivial pericardial effusion.  · Echocardiogram 7/19/2021 with ejection fraction 56%, left atrial volume moderately increased, grade 2 diastolic dysfunction, severe calcification aortic valve, moderate aortic stenosis, severe mitral calcification, mild mitral stenosis, marked elevation in RVSP from tricuspid regurgitation.  · Patient notes that she discussed echo results with cardiology and valvular status was felt to be stable in regards to aortic stenosis.    *Left upper and bilateral lower extremity peripheral neuropathy:  · Patient reports that left upper extremity neuropathy was not present from previous chemotherapy but occurred after hospitalization that required intubation and critical care  stay.  Apparently felt to represent critical care neuropathy.  Improved over time.  · Bilateral lower extremity neuropathy felt to be related to diabetes  · May have future implications regarding treatment for breast cancer.  · Patient reports that peripheral neuropathy symptoms continue in the bilateral lower extremities, unchanged.  This will be a consideration with potential future use of Taxol    *Uterine/endometrial activity on PET scan:  · Patient experienced postmenopausal vaginal bleeding in 2013, negative endometrial biopsy and gynecologic evaluation.  · With findings on PET scan with enlarging uterus and some hypermetabolic activity, referred back to Dr. Oliveros in gynecology.    · 9/19/2019 D&C with hysteroscopy by Dr. Oliveros, no significant intraoperative findings, pathologic results with benign findings, no evidence of malignancy.    *Nausea:  · Mild, relieved with Zofran.   · Patient experienced improvement in nausea after initiating hemodialysis  · No recent nausea    *Myelosuppression secondary to Ibrance:  · WBC during hospitalization 1/28-1/30/2022 declined into the 1.9-2.0 range due to concurrent COVID-19 infection.  Ibrance was held briefly.  · Ibrance resumed on 2/9/2022 with WBC up to 4.49, ANC 3.07  · Today, WBC 3.2, ANC 2.05    *Depression  · Patient with history of depression, worsened after the death of her son in November 2021  · With evidence of progressive malignancy in November 2021, patient agreeable to referral to supportive oncology  · Patient was seen in supportive oncology 11/18/2021, Zoloft increased from 50 up to 100 mg daily.  Prescribed armodafinil 50 mg daily however there or issues with insurance coverage  · Patient does continue with difficulties regarding depression that wax and wane    *Left perinephric stranding secondary to malignancy with hydronephrosis:  · CT 4/22/2021 showed interval enlargement of 2 staghorn calculi in the left kidney with persistent left perinephric  stranding  · Hospitalization 4/29-5/4/2021 with RYAN/CKD, UTI, anemia.  Required 2 unit PRBC transfusion and initiated hemodialysis Tuesday Thursday Saturday.    · Discussion from urology regarding potential need for surgical procedure to address staghorn calculus left kidney  · CT scan 7/2/2021 with only 1 remaining left renal stone identified which had decreased in size.  Patient appears to have passed the other stone.  · As above, CT 10/26/2021 with more prominent left hydronephrosis and increase in left perinephric and periureteral stranding.  Felt to possibly be related to passage of recent stone versus partial obstruction secondary to debris or thrombus in the left ureter versus pyelonephritis.  Patient however with no clinical evidence of recent stone passage nor pyelonephritis. Malignancy felt to be the cause of CT changes around the left kidney at this point.   · Left ureteral stent replacement 12/16/2021  · PET scan 1/11/2022 with decrease in fat injection near left renal pelvis, stent in satisfactory position.  Mild residual hydronephrosis on the left.  · Ureteral stent replaced on 3/10/2022  · PET scan 4/19/2022 with no hydronephrosis, left ureteral stent in place    *Right thyroid nodules  · Patient underwent prior thyroid biopsy by her report with benign findings many years ago, records not available  · On MRI cervical spine 11/18/2021, finding of 1.8 cm right thyroid nodule  · Thyroid ultrasound 11/26/2021 with right-sided thyroid nodules, 1 nodule was hypoechoic, solid measuring 2 cm with biopsy recommended.  · Thyroid ultrasound results reviewed with patient.  In light of her metastatic breast cancer, renal failure the significance of the thyroid nodule is felt to be much less.  We discussed possibility of thyroid biopsy however patient is inclined to forego this, especially since she has had a biopsy performed in the past with benign findings by her report.    · No hypermetabolic activity identified  on PET scan 1/11/2022 in the neck    *Hospitalization 1/28-1/30/2022 due to COVID-19 infection and C. difficile colitis  · Patient fully vaccinated with 3 doses Moderna COVID-19 vaccine  · Patient developed symptoms 1/26/2022 with abrupt onset sinus congestion, mild cough, subsequently developed nausea and diarrhea on 1/27/2022.  Significant fatigue.  · Patient tested positive in emergency department on 1/28/2022 and was admitted  · Patient maintained O2 saturation in mid 90% range on room air  · Patient received remdesivir  · Patient was also found to be positive for C. difficile colitis, received course of oral vancomycin.  · Symptoms from both COVID-19 and C. difficile colitis ultimately resolved.    Plan:  1. Continue with radiation therapy under the direction of Dr. Sotomayor. Note plans also to undergo MRI cervical spine and add in treatment to C7 and possibly left clavicle.  Patient on schedule to complete radiation as of 6/7/2022.  2. Continue to hold Ibrance while undergoing radiation.  3. No current need for transfusion  4. Continue oral folic acid 1 mg daily, B12 1000 mcg daily.  5. The patient is continuing on hemodialysis every Monday and Friday and is receiving Epogen 20,000 units with each dialysis per nephrology.  Patient is also receiving IV iron every Monday with dialysis.  6. Patient is aware that if she has an attempted break from hemodialysis she would need to continue on BEAU at least weekly either with nephrology or through our office with Procrit.  7. In 2 weeks MD visit with CBC, CMP, magnesium, phosphorus, CA 15-3.  Patient will be due for cycle 8-day 1 Faslodex and monthly Xgeva as well as continuation of Ibrance.  Tentatively plan to restart Ibrance at that visit pending count review.

## 2022-06-02 ENCOUNTER — OFFICE VISIT (OUTPATIENT)
Dept: ONCOLOGY | Facility: CLINIC | Age: 62
End: 2022-06-02

## 2022-06-02 ENCOUNTER — LAB (OUTPATIENT)
Dept: LAB | Facility: HOSPITAL | Age: 62
End: 2022-06-02

## 2022-06-02 ENCOUNTER — OFFICE VISIT (OUTPATIENT)
Dept: PSYCHIATRY | Facility: HOSPITAL | Age: 62
End: 2022-06-02

## 2022-06-02 VITALS
HEIGHT: 67 IN | WEIGHT: 293 LBS | DIASTOLIC BLOOD PRESSURE: 69 MMHG | TEMPERATURE: 97.1 F | SYSTOLIC BLOOD PRESSURE: 112 MMHG | RESPIRATION RATE: 16 BRPM | OXYGEN SATURATION: 98 % | BODY MASS INDEX: 45.99 KG/M2 | HEART RATE: 66 BPM

## 2022-06-02 DIAGNOSIS — G47.33 OSA (OBSTRUCTIVE SLEEP APNEA): ICD-10-CM

## 2022-06-02 DIAGNOSIS — R53.83 FATIGUE, UNSPECIFIED TYPE: Primary | ICD-10-CM

## 2022-06-02 DIAGNOSIS — D63.1 ANEMIA IN STAGE 3B CHRONIC KIDNEY DISEASE: ICD-10-CM

## 2022-06-02 DIAGNOSIS — N18.32 ANEMIA IN STAGE 3B CHRONIC KIDNEY DISEASE: ICD-10-CM

## 2022-06-02 DIAGNOSIS — C50.919 METASTATIC BREAST CANCER: Primary | ICD-10-CM

## 2022-06-02 DIAGNOSIS — D61.810 PANCYTOPENIA DUE TO CHEMOTHERAPY: ICD-10-CM

## 2022-06-02 DIAGNOSIS — C50.919 METASTATIC BREAST CANCER: ICD-10-CM

## 2022-06-02 DIAGNOSIS — D63.8 ANEMIA, CHRONIC DISEASE: ICD-10-CM

## 2022-06-02 DIAGNOSIS — F41.1 GENERALIZED ANXIETY DISORDER: ICD-10-CM

## 2022-06-02 DIAGNOSIS — F33.41 RECURRENT MAJOR DEPRESSIVE DISORDER, IN PARTIAL REMISSION: ICD-10-CM

## 2022-06-02 LAB
ALBUMIN SERPL-MCNC: 3.6 G/DL (ref 3.5–5.2)
ALBUMIN/GLOB SERPL: 1 G/DL (ref 1.1–2.4)
ALP SERPL-CCNC: 87 U/L (ref 38–116)
ALT SERPL W P-5'-P-CCNC: 21 U/L (ref 0–33)
ANION GAP SERPL CALCULATED.3IONS-SCNC: 11.3 MMOL/L (ref 5–15)
AST SERPL-CCNC: 24 U/L (ref 0–32)
BASOPHILS # BLD AUTO: 0.06 10*3/MM3 (ref 0–0.2)
BASOPHILS NFR BLD AUTO: 1.9 % (ref 0–1.5)
BILIRUB SERPL-MCNC: 0.3 MG/DL (ref 0.2–1.2)
BUN SERPL-MCNC: 42 MG/DL (ref 6–20)
BUN/CREAT SERPL: 9.7 (ref 7.3–30)
CALCIUM SPEC-SCNC: 7.1 MG/DL (ref 8.5–10.2)
CHLORIDE SERPL-SCNC: 103 MMOL/L (ref 98–107)
CO2 SERPL-SCNC: 25.7 MMOL/L (ref 22–29)
CREAT SERPL-MCNC: 4.33 MG/DL (ref 0.6–1.1)
DEPRECATED RDW RBC AUTO: 66.9 FL (ref 37–54)
EGFRCR SERPLBLD CKD-EPI 2021: 10.9 ML/MIN/1.73
EOSINOPHIL # BLD AUTO: 0.21 10*3/MM3 (ref 0–0.4)
EOSINOPHIL NFR BLD AUTO: 6.5 % (ref 0.3–6.2)
ERYTHROCYTE [DISTWIDTH] IN BLOOD BY AUTOMATED COUNT: 17.2 % (ref 12.3–15.4)
GLOBULIN UR ELPH-MCNC: 3.5 GM/DL (ref 1.8–3.5)
GLUCOSE SERPL-MCNC: 114 MG/DL (ref 74–124)
HCT VFR BLD AUTO: 28.4 % (ref 34–46.6)
HGB BLD-MCNC: 8.4 G/DL (ref 12–15.9)
IMM GRANULOCYTES # BLD AUTO: 0.09 10*3/MM3 (ref 0–0.05)
IMM GRANULOCYTES NFR BLD AUTO: 2.8 % (ref 0–0.5)
LYMPHOCYTES # BLD AUTO: 0.44 10*3/MM3 (ref 0.7–3.1)
LYMPHOCYTES NFR BLD AUTO: 13.7 % (ref 19.6–45.3)
MCH RBC QN AUTO: 31.3 PG (ref 26.6–33)
MCHC RBC AUTO-ENTMCNC: 29.6 G/DL (ref 31.5–35.7)
MCV RBC AUTO: 106 FL (ref 79–97)
MONOCYTES # BLD AUTO: 0.37 10*3/MM3 (ref 0.1–0.9)
MONOCYTES NFR BLD AUTO: 11.5 % (ref 5–12)
NEUTROPHILS NFR BLD AUTO: 2.05 10*3/MM3 (ref 1.7–7)
NEUTROPHILS NFR BLD AUTO: 63.6 % (ref 42.7–76)
NRBC BLD AUTO-RTO: 0 /100 WBC (ref 0–0.2)
PLATELET # BLD AUTO: 167 10*3/MM3 (ref 140–450)
PMV BLD AUTO: 8.3 FL (ref 6–12)
POTASSIUM SERPL-SCNC: 4.4 MMOL/L (ref 3.5–4.7)
PROT SERPL-MCNC: 7.1 G/DL (ref 6.3–8)
RAD ONC ARIA COURSE ID: NORMAL
RAD ONC ARIA COURSE INTENT: NORMAL
RAD ONC ARIA COURSE LAST TREATMENT DATE: NORMAL
RAD ONC ARIA COURSE START DATE: NORMAL
RAD ONC ARIA COURSE TREATMENT ELAPSED DAYS: 9
RAD ONC ARIA FIRST TREATMENT DATE: NORMAL
RAD ONC ARIA PLAN FRACTIONS TREATED TO DATE: 7
RAD ONC ARIA PLAN ID: NORMAL
RAD ONC ARIA PLAN PRESCRIBED DOSE PER FRACTION: 3 GY
RAD ONC ARIA PLAN PRIMARY REFERENCE POINT: NORMAL
RAD ONC ARIA PLAN TOTAL FRACTIONS PRESCRIBED: 10
RAD ONC ARIA PLAN TOTAL PRESCRIBED DOSE: 3000 CGY
RAD ONC ARIA REFERENCE POINT DOSAGE GIVEN TO DATE: 21 GY
RAD ONC ARIA REFERENCE POINT DOSAGE GIVEN TO DATE: 21.42 GY
RAD ONC ARIA REFERENCE POINT ID: NORMAL
RAD ONC ARIA REFERENCE POINT ID: NORMAL
RAD ONC ARIA REFERENCE POINT SESSION DOSAGE GIVEN: 3 GY
RAD ONC ARIA REFERENCE POINT SESSION DOSAGE GIVEN: 3.06 GY
RBC # BLD AUTO: 2.68 10*6/MM3 (ref 3.77–5.28)
SODIUM SERPL-SCNC: 140 MMOL/L (ref 134–145)
WBC NRBC COR # BLD: 3.22 10*3/MM3 (ref 3.4–10.8)

## 2022-06-02 PROCEDURE — 85025 COMPLETE CBC W/AUTO DIFF WBC: CPT

## 2022-06-02 PROCEDURE — 36415 COLL VENOUS BLD VENIPUNCTURE: CPT

## 2022-06-02 PROCEDURE — 77386 CHG INTENSITY MODULATED RADIATION TX DLVR COMPLEX: CPT | Performed by: RADIOLOGY

## 2022-06-02 PROCEDURE — 77386: CPT | Performed by: RADIOLOGY

## 2022-06-02 PROCEDURE — 77014 CHG CT GUIDANCE RADIATION THERAPY FLDS PLACEMENT: CPT | Performed by: RADIOLOGY

## 2022-06-02 PROCEDURE — 99214 OFFICE O/P EST MOD 30 MIN: CPT | Performed by: NURSE PRACTITIONER

## 2022-06-02 PROCEDURE — 80053 COMPREHEN METABOLIC PANEL: CPT

## 2022-06-02 PROCEDURE — 99213 OFFICE O/P EST LOW 20 MIN: CPT | Performed by: NURSE PRACTITIONER

## 2022-06-02 NOTE — PROGRESS NOTES
[facility]  In Person Session    Subjective  Patient ID: Juana Hall is a 61 y.o. female is seen face to face in the [facility].    CC: Demoralization    HPI:   Pt acknolwedges continued demoralization regarding persistent dialysis, progressive disease, and continued dedication to treatment. Has completeed 7/10 days of radiation. Denies pain, although endorses frustration with feelings of helplessness surrounding progression of physical decline despite dedication to recommended interventions. Continues to allow herself space to do as much as she can, with personal permission to discontinue tx when needed. Currently confirms interest in pursing whatever treatment is available. Endorses desire to on vacation, noting barrier of frequent dialysis. Continues to questions what the end game is, not understanding when, what, or how this will be. Pt continues to report enjoyment of playing cards; has apreciated connection to loved ones, including visit with sister. Continues to acknowledge grief regarding loss of son, while also allowing self to live believing he has achieved healing. Pt continues to report improved sleep, appreciating rock music noting this is able to distract other thoughts. Has found benefit to gabapentin, taking these earlier and going to bed a little later. Energy has been somewhat responsive to higher dose of armodafinil, finding this assists in behavioral activation, continued engagement.     Objective   Mental Status Exam  Appearance:  clean and casually dressed, appropriate  Attitude toward clinician:  cooperative and agreeable   Speech:    Rate:  regular rate and rhythm   Volume:  normal  Motor:  no abnormal movements present  Mood:  Demoralized, stable  Affect:  euthymic and mood congruent  Thought Processes:  linear, logical, and goal directed  Thought Content:  normal  Suicidal Thoughts:  absent  Homicidal Thoughts:  absent  Perceptual  Disturbance: no perceptual disturbance  Attention and Concentration:  good  Insight and Judgement:  good  Memory:  memory appears to be intact    Review of Systems   Constitutional: Positive for activity change and fatigue.   Psychiatric/Behavioral: Positive for sleep disturbance. Negative for dysphoric mood. The patient is not nervous/anxious.      Medications Reviewed:  Zoloft 150 mg daily  Seroquel 50 mg q hs  Armodafinil 250 mg daily    Diagnoses and all orders for this visit:    1. Fatigue, unspecified type (Primary)    2. ANDREW (obstructive sleep apnea)    3. Recurrent major depressive disorder, in partial remission (HCC)    4. Generalized anxiety disorder    Plan of Care  Continue medications as written, inclusive of zoloft, seroquel, and armodafinil.  Continued counseling provided surrounding grief and loss, demoralization, and QOL goals/ medical decision making.  Discussed clinic limitations, potential transition to group.  Patient is agreeable.  Will discuss further next visit; follow-up arranged in clinic one-on-one in 4 weeks.    I spent 35 minutes caring for Juana on this date of service. This time includes time spent by me in the following activities: preparing for the visit, obtaining and/or reviewing a separately obtained history, performing a medically appropriate examination and/or evaluation, counseling and educating the patient/family/caregiver, ordering medications, tests, or procedures and documenting information in the medical record.

## 2022-06-03 LAB
RAD ONC ARIA COURSE ID: NORMAL
RAD ONC ARIA COURSE INTENT: NORMAL
RAD ONC ARIA COURSE LAST TREATMENT DATE: NORMAL
RAD ONC ARIA COURSE START DATE: NORMAL
RAD ONC ARIA COURSE TREATMENT ELAPSED DAYS: 10
RAD ONC ARIA FIRST TREATMENT DATE: NORMAL
RAD ONC ARIA PLAN FRACTIONS TREATED TO DATE: 8
RAD ONC ARIA PLAN ID: NORMAL
RAD ONC ARIA PLAN PRESCRIBED DOSE PER FRACTION: 3 GY
RAD ONC ARIA PLAN PRIMARY REFERENCE POINT: NORMAL
RAD ONC ARIA PLAN TOTAL FRACTIONS PRESCRIBED: 10
RAD ONC ARIA PLAN TOTAL PRESCRIBED DOSE: 3000 CGY
RAD ONC ARIA REFERENCE POINT DOSAGE GIVEN TO DATE: 24 GY
RAD ONC ARIA REFERENCE POINT DOSAGE GIVEN TO DATE: 24.48 GY
RAD ONC ARIA REFERENCE POINT ID: NORMAL
RAD ONC ARIA REFERENCE POINT ID: NORMAL
RAD ONC ARIA REFERENCE POINT SESSION DOSAGE GIVEN: 3 GY
RAD ONC ARIA REFERENCE POINT SESSION DOSAGE GIVEN: 3.06 GY

## 2022-06-03 PROCEDURE — 77386 CHG INTENSITY MODULATED RADIATION TX DLVR COMPLEX: CPT | Performed by: RADIOLOGY

## 2022-06-03 PROCEDURE — 77386: CPT | Performed by: RADIOLOGY

## 2022-06-03 PROCEDURE — 77014 CHG CT GUIDANCE RADIATION THERAPY FLDS PLACEMENT: CPT | Performed by: RADIOLOGY

## 2022-06-06 LAB
RAD ONC ARIA COURSE ID: NORMAL
RAD ONC ARIA COURSE INTENT: NORMAL
RAD ONC ARIA COURSE LAST TREATMENT DATE: NORMAL
RAD ONC ARIA COURSE START DATE: NORMAL
RAD ONC ARIA COURSE TREATMENT ELAPSED DAYS: 13
RAD ONC ARIA FIRST TREATMENT DATE: NORMAL
RAD ONC ARIA PLAN FRACTIONS TREATED TO DATE: 9
RAD ONC ARIA PLAN ID: NORMAL
RAD ONC ARIA PLAN PRESCRIBED DOSE PER FRACTION: 3 GY
RAD ONC ARIA PLAN PRIMARY REFERENCE POINT: NORMAL
RAD ONC ARIA PLAN TOTAL FRACTIONS PRESCRIBED: 10
RAD ONC ARIA PLAN TOTAL PRESCRIBED DOSE: 3000 CGY
RAD ONC ARIA REFERENCE POINT DOSAGE GIVEN TO DATE: 27 GY
RAD ONC ARIA REFERENCE POINT DOSAGE GIVEN TO DATE: 27.54 GY
RAD ONC ARIA REFERENCE POINT ID: NORMAL
RAD ONC ARIA REFERENCE POINT ID: NORMAL
RAD ONC ARIA REFERENCE POINT SESSION DOSAGE GIVEN: 3 GY
RAD ONC ARIA REFERENCE POINT SESSION DOSAGE GIVEN: 3.06 GY

## 2022-06-06 PROCEDURE — 77386 CHG INTENSITY MODULATED RADIATION TX DLVR COMPLEX: CPT | Performed by: RADIOLOGY

## 2022-06-06 PROCEDURE — 77386: CPT | Performed by: RADIOLOGY

## 2022-06-06 PROCEDURE — 77014 CHG CT GUIDANCE RADIATION THERAPY FLDS PLACEMENT: CPT | Performed by: RADIOLOGY

## 2022-06-06 NOTE — TELEPHONE ENCOUNTER
Refill requested from pharmacy. currently on hold while pt undergoes radiation . Will send when appropriate/due.

## 2022-06-07 ENCOUNTER — RADIATION ONCOLOGY WEEKLY ASSESSMENT (OUTPATIENT)
Dept: RADIATION ONCOLOGY | Facility: HOSPITAL | Age: 62
End: 2022-06-07

## 2022-06-07 ENCOUNTER — DOCUMENTATION (OUTPATIENT)
Dept: RADIATION ONCOLOGY | Facility: HOSPITAL | Age: 62
End: 2022-06-07

## 2022-06-07 VITALS
SYSTOLIC BLOOD PRESSURE: 114 MMHG | RESPIRATION RATE: 16 BRPM | OXYGEN SATURATION: 99 % | HEART RATE: 95 BPM | WEIGHT: 293 LBS | BODY MASS INDEX: 52.82 KG/M2 | DIASTOLIC BLOOD PRESSURE: 68 MMHG

## 2022-06-07 DIAGNOSIS — C50.919 METASTATIC BREAST CANCER: Primary | ICD-10-CM

## 2022-06-07 LAB
RAD ONC ARIA COURSE ID: NORMAL
RAD ONC ARIA COURSE INTENT: NORMAL
RAD ONC ARIA COURSE LAST TREATMENT DATE: NORMAL
RAD ONC ARIA COURSE START DATE: NORMAL
RAD ONC ARIA COURSE TREATMENT ELAPSED DAYS: 14
RAD ONC ARIA FIRST TREATMENT DATE: NORMAL
RAD ONC ARIA PLAN FRACTIONS TREATED TO DATE: 10
RAD ONC ARIA PLAN ID: NORMAL
RAD ONC ARIA PLAN PRESCRIBED DOSE PER FRACTION: 3 GY
RAD ONC ARIA PLAN PRIMARY REFERENCE POINT: NORMAL
RAD ONC ARIA PLAN TOTAL FRACTIONS PRESCRIBED: 10
RAD ONC ARIA PLAN TOTAL PRESCRIBED DOSE: 3000 CGY
RAD ONC ARIA REFERENCE POINT DOSAGE GIVEN TO DATE: 30 GY
RAD ONC ARIA REFERENCE POINT DOSAGE GIVEN TO DATE: 30.6 GY
RAD ONC ARIA REFERENCE POINT ID: NORMAL
RAD ONC ARIA REFERENCE POINT ID: NORMAL
RAD ONC ARIA REFERENCE POINT SESSION DOSAGE GIVEN: 3 GY
RAD ONC ARIA REFERENCE POINT SESSION DOSAGE GIVEN: 3.06 GY

## 2022-06-07 PROCEDURE — 77386: CPT | Performed by: RADIOLOGY

## 2022-06-07 PROCEDURE — 77386 CHG INTENSITY MODULATED RADIATION TX DLVR COMPLEX: CPT | Performed by: RADIOLOGY

## 2022-06-07 PROCEDURE — 77014 CHG CT GUIDANCE RADIATION THERAPY FLDS PLACEMENT: CPT | Performed by: RADIOLOGY

## 2022-06-08 ENCOUNTER — SPECIALTY PHARMACY (OUTPATIENT)
Dept: PHARMACY | Facility: HOSPITAL | Age: 62
End: 2022-06-08

## 2022-06-08 NOTE — PROGRESS NOTES
Refill requested from pharmacy. Per last chart note, patient was on hold of ibrance due to radiation, but likely will continue Ibrance 100 mg daily for 7 days on followed by 7 days off thereafter. Will route to MD for cosignature.

## 2022-06-14 PROCEDURE — 77336 RADIATION PHYSICS CONSULT: CPT | Performed by: RADIOLOGY

## 2022-06-15 NOTE — PROGRESS NOTES
"Chief Complaint  Metastatic lobular breast cancer (ER/MD positive, HER-2/tj negative), anemia secondary to CKD3, CHF, peripheral neuropathy, chemotherapy related nausea chemotherapy-induced myelosuppression    Subjective        History of Present Illness  The patient returns today in follow-up continuing currently on Faslodex and Ibrance as well as monthly Xgeva.  She he is due today for cycle 8 day 1 Faslodex and is receiving Ibrance at a dose of 100 mg daily for 7 days on followed by 7 days off (although Ibrance has been on hold since patient had been receiving radiation therapy to the lumbar spine).  She is due today for Xgeva as well, second monthly dose.    In the interim, patient did complete her course of radiation to the lumbar spine on 6/7/2022.  She tolerated this very well, had some mild fatigue.  She is not experiencing any pain in her back.  She overall feels fairly well currently.  Since she has been off of Ibrance, her anemia has improved, hemoglobin stable today at 8.4.  She does continue on hemodialysis every Monday and Friday with Epogen dosing 20,000 units.  She notes that her tunneled dialysis catheter has been removed and she is maintaining dialysis through her left upper extremity AV fistula which is now functioning normally.  Patient today reports normal appetite.  She has no new musculoskeletal pain.  No new complaints.       Objective   Vital Signs:   BP 95/56   Pulse 68   Temp 97.1 °F (36.2 °C) (Temporal)   Resp 18   Ht 170.2 cm (67.01\")   Wt (!) 153 kg (337 lb 4.9 oz) Comment: states  SpO2 97%   BMI 52.82 kg/m²     Physical Exam  Constitutional:       Appearance: She is well-developed. She is obese.      Comments: Patient is in a motorized scooter today   Eyes:      Conjunctiva/sclera: Conjunctivae normal.   Neck:      Thyroid: No thyromegaly.   Cardiovascular:      Rate and Rhythm: Normal rate and regular rhythm.      Heart sounds: Murmur heard.     No friction rub. No gallop.     "  Comments: 2/6 murmur  Pulmonary:      Effort: No respiratory distress.      Breath sounds: Normal breath sounds.      Comments: Dialysis access in place in the right subclavian position  Chest:   Breasts:      Right: No supraclavicular adenopathy.      Left: No supraclavicular adenopathy.       Abdominal:      General: Bowel sounds are normal. There is no distension.      Palpations: Abdomen is soft.      Tenderness: There is no abdominal tenderness.   Musculoskeletal:         General: Swelling present.      Comments: 1+ bilateral lower extremity edema with venous stasis changes noted.   Lymphadenopathy:      Head:      Right side of head: No submandibular adenopathy.      Cervical: No cervical adenopathy.      Upper Body:      Right upper body: No supraclavicular adenopathy.      Left upper body: No supraclavicular adenopathy.   Skin:     General: Skin is warm and dry.      Findings: No bruising or rash.   Neurological:      Mental Status: She is alert and oriented to person, place, and time.      Cranial Nerves: No cranial nerve deficit.      Motor: No abnormal muscle tone.      Deep Tendon Reflexes: Reflexes normal.   Psychiatric:         Behavior: Behavior normal.        Patient was examined today, unchanged from above    Result Review : Reviewed CBC, CMP, magnesium, phosphorus from today.  Reviewed MRI cervical spine 5/23/2022.       Assessment and Plan    *Metastatic lobular breast cancer (ER/WV positive, HER-2/tj negative):  · Previous stage IIIC right breast cancer in 2003, received neoadjuvant chemotherapy (unknown regimen) with subsequent bilateral mastectomies 1/22/2004 with right axillary dissection.  Residual 10-12 cm invasive lobular carcinoma on the right breast with 14/16+ nodes with extranodal extension.  Received adjuvant carboplatin and Navelbine chemotherapy x4 cycles  · Attempted adjuvant radiation to the chest wall however interrupted due to cellulitis that required removal of implants in  August 2004  · Received tamoxifen from 6/22/2004 until June 2009.  Transitioned to Femara and received until June 2014  · Identification of CT findings concerning for carcinomatosis on CT scans and June 2019.  · PET scan confirmed hypermetabolic activity in the omentum as well as small retroperitoneal lymph nodes.  · CT-guided omental biopsy 7/30/2019 with metastatic lobular carcinoma of breast primary, ER positive (greater than 95%), WI positive (90%), HER-2/tj negative (1+ IHC)  · [facility] liquid biopsy 2/5/2020: MSI undetermined, mutations in BETH, NF1, CHEK2.  No evidence of PIK3CA mutation.  · Subsequent [facility] germline testing 11/12/2021 with BETH VUS (c.2864C>A) and POLE VUS (c.631A>T)  · Initiation of Aromasin and Ibrance in August 2019  · Difficulty with myelosuppression related to Ibrance requiring dose alterations, eventually changed to 100 mg for 7 days on followed by 7 days off.  · CT chest abdomen pelvis 10/26/2021 with increase in left hydronephrosis with increase in left perinephric and periureteral stranding. Decrease in stone in the left kidney lower pole (7 mm).  Stable small retroperitoneal lymph and left periaortic lymph nodes.  · PET scan 11/10/2021 with multiple bilateral hypermetabolic nodular pulmonary lesions, asymmetric moderate to severe left hydronephrosis with ill-defined fat stranding and soft tissue thickening in the renal sinus and surrounding the left ureter, hypermetabolic left retroperitoneal lymph node with slight increase in size, ill-defined hypermetabolic thickening in the inferior omentum with increase in size, uptake and C7 (indeterminate, recommended MRI).  Short segments of uptake in the mid and distal esophagus possibly related to gastritis.  · PET scan findings felt to be consistent with progressive malignancy.  Patient declined repeat omental biopsy.   · Options for further treatment discussed on 11/12/2021.  In light of renal failure and dialysis, options  are more limited.  Recommendation to continue Ibrance and discontinue Aromasin, begin Faslodex.  Discussed possible future use of single agent Taxol.    · Aromasin discontinued 11/12/2021  · On 11/22/2021 initiation of Faslodex with continuation of Ibrance (100 mg daily for 7 days on followed by 7 days off).  · MRI cervical spine 11/18/2021 showed the right C7 normality to likely represent metastatic disease.  With solitary bone lesion, did not recommend pursuit of bone modifying agent.  · Following 2 cycles Faslodex/Ibrance, PET scan on 1/11/2022 showed no change in the soft tissue superior to the dome of the bladder with hypermetabolic activity (likely related to carcinomatosis).  Significant decrease in injection of fat around the left renal pelvis and stable retroperitoneal hypermetabolic lymph nodes, decrease in size and activity in small bilateral pulmonary nodules.  Findings felt to represent evidence of response to treatment.    · Ibrance held briefly beginning 1/28/2022 due to hospitalization with COVID-19 infection and C. difficile colitis.  Patient developed leukopenia/neutropenia.  Ibrance resumed at previous dosing (100 mg daily 7 days on followed by 7 days off) on 2/9/22.  · Following 5 cycles Faslodex/Ibrance PET scan 4/19/2022 with evidence of mixed response.  New hypermetabolic lesion spinous process L2 (SUV 5.1) and slight increase in activity left clavicular metastasis (SUV increased 4.6-8.1).  C7 hypermetabolic mixed lytic and blastic bone lesion was unchanged.  Decrease in uptake involving omental thickening.  Stable soft tissue thickening around the left renal pelvis and ureter.  Discussion again regarding potential pursuit of IV chemotherapy with Taxol versus continuation of Ibrance and Faslodex with radiation to sites of bony disease at L2, left clavicle, C7.  Patient opted to defer initiation of systemic chemotherapy and continue Ibrance/Faslodex with consideration of radiation.  · Initiated  monthly Xgeva on 5/19/2022 (cleared with nephrology)  · Palliative radiation received to lumbar spine regarding L2 metastasis 5/23- 6/7/2022  · Ibrance held during radiation therapy beginning 5/22/2022.  Ibrance resumed 6/16/2022.  · Second dose Xgeva held on 6/16/2022 due to severe hypocalcemia, calcium 6.4.  Nephrology notified and adjustment made in dialysate.  · The patient returns today in follow-up continuing on Faslodex and Ibrance.  She is due for cycle 8-day 1 Faslodex 500 mg IM today.  She continues on Ibrance 100 mg daily 7 days on followed by 7 days off however has been holding Ibrance due to recent radiation to the lumbar spine since 5/22/2022.  She completed radiation therapy to the lumbar spine on 6/7/2022.  Therefore we discussed resuming Ibrance today at her previous dose and schedule.  Clinically she is doing quite well today.  Since she has been off of Ibrance, her hemoglobin has improved and is stable at 8.4 today, no recent transfusions required.  We will assume that once we resume Ibrance she will have further exacerbation of her anemia.  She is well aware of this.  She did begin monthly Xgeva however today her calcium has declined to 6.4.  We will hold Xgeva today and I did notify Dr. Antonio who will make an adjustment in her dialysate.  We will possibly resume Xgeva when she returns in 4 weeks.  CA 15-3 was checked today and has declined slightly down to 23.6.  We did discuss pursuit of follow-up scans in 5 weeks and I will see her in 6 weeks to review results.    *Anemia secondary to ESRD, exacerbated by Ibrance:  · Anemia secondary to ESRD, malignancy with exacerbation by use of Ibrance  · Previous evidence of folate deficiency 8/12/2019 with level 3.84, initiated folic acid 1 mg daily  · Previous evidence of iron deficiency, received Injectafer on 8/7/2020 750 mg IV x1 dose.  Second dose not administered due to onset of fever, subsequent iron studies precluded further administration of IV  iron.  · Previous low normal B12 level initiated oral B12 1000 mcg daily.  · Patient had previously received Procrit and required intermittent transfusion support  · Following initiation of hemodialysis, management of BEAU transitioned to nephrology, receiving Epogen with dialysis.  · Ongoing transfusion support prompted alteration in Ibrance dosing on 8/23/2021.  · After alteration in Ibrance dosing, hemoglobin improved and remained stable in the 9-10 range.  · Improved but ongoing intermittent need for transfusion support despite Ibrance dose alteration  · Stool negative for occult blood x3 on 11/2/2021  · Patient with reduced dialysis frequency to 2 days/week with concurrent decrease in Epogen dosing corresponding again to increased transfusion requirement.  · Epogen dose increased per nephrology to 20,000 units twice weekly with dialysis on Mondays and Fridays  · Ibrance again exacerbating anemia with ongoing intermittent transfusion requirements  · Additional transfusions required with hemoglobin on 3/31/2022 6.6, hemoglobin 4/21/2022 of 6.4, hemoglobin on 5/5/2022 of 6.8.  · Patient today with hemoglobin stable at 8.4.  Recent stability of hemoglobin is related to holding Ibrance.  She is resuming Ibrance today and anticipate that this will exacerbate her anemia in the near future likely requiring additional transfusion support.  She is continuing on Epogen 20,000 units Mondays and Fridays with dialysis as well as IV iron on Mondays.    *ESRD on HD:  · Patient with previous CKD3/4  · Hospitalization 4/29-5/4/2021 with RYAN/CKD, UTI, anemia.  Required initiation of hemodialysis Tuesday Thursday Saturday.   · Decrease in frequency of dialysis to twice per week.  She continues to produce a significant amount of urine.   · Status post left upper extremity AV fistula placement on 11/3/2021 by vascular surgery  · Attempt to hold further dialysis on 1/11/2022 with close monitoring of her laboratory and clinical  parameters.  Dialysis quickly resumed due to development of hyperkalemia.  · Patient continues on dialysis 2 days/week on Mondays and Fridays.  Per patient report potential future consideration to hold dialysis again in the future.  Patient is followed closely by Dr. Antonio.      *CHF with mild to moderate aortic stenosis:  · Echocardiogram 7/12/2019 showing ejection fraction 48% with moderate dilation of the LV cavity, global hypokinesis, grade 2 diastolic dysfunction, mild to moderate aortic stenosis, trivial pericardial effusion.  · Echocardiogram 7/19/2021 with ejection fraction 56%, left atrial volume moderately increased, grade 2 diastolic dysfunction, severe calcification aortic valve, moderate aortic stenosis, severe mitral calcification, mild mitral stenosis, marked elevation in RVSP from tricuspid regurgitation.  · Patient notes that she discussed echo results with cardiology and valvular status was felt to be stable in regards to aortic stenosis.    *Left upper and bilateral lower extremity peripheral neuropathy:  · Patient reports that left upper extremity neuropathy was not present from previous chemotherapy but occurred after hospitalization that required intubation and critical care stay.  Apparently felt to represent critical care neuropathy.  Improved over time.  · Bilateral lower extremity neuropathy felt to be related to diabetes  · May have future implications regarding treatment for breast cancer.  · Patient reports that peripheral neuropathy symptoms continue in the bilateral lower extremities, unchanged.  This will be a consideration with potential future use of Taxol    *Uterine/endometrial activity on PET scan:  · Patient experienced postmenopausal vaginal bleeding in 2013, negative endometrial biopsy and gynecologic evaluation.  · With findings on PET scan with enlarging uterus and some hypermetabolic activity, referred back to Dr. Oliveros in gynecology.    · 9/19/2019 D&C with hysteroscopy  by Dr. Oliveros, no significant intraoperative findings, pathologic results with benign findings, no evidence of malignancy.    *Nausea:  · Mild, relieved with Zofran.   · Patient experienced improvement in nausea after initiating hemodialysis  · No recent nausea    *Myelosuppression secondary to Ibrance:  · WBC during hospitalization 1/28-1/30/2022 declined into the 1.9-2.0 range due to concurrent COVID-19 infection.  Ibrance was held briefly.  · Ibrance resumed on 2/9/2022 with WBC up to 4.49, ANC 3.07  · Today, WBC 4.89, ANC 3.54    *Depression  · Patient with history of depression, worsened after the death of her son in November 2021  · With evidence of progressive malignancy in November 2021, patient agreeable to referral to supportive oncology  · Patient was seen in supportive oncology 11/18/2021, Zoloft increased from 50 up to 100 mg daily.  Prescribed armodafinil 50 mg daily however there or issues with insurance coverage  · Patient does continue with difficulties regarding depression that wax and wane    *Left perinephric stranding secondary to malignancy with hydronephrosis:  · CT 4/22/2021 showed interval enlargement of 2 staghorn calculi in the left kidney with persistent left perinephric stranding  · Hospitalization 4/29-5/4/2021 with RYAN/CKD, UTI, anemia.  Required 2 unit PRBC transfusion and initiated hemodialysis Tuesday Thursday Saturday.    · Discussion from urology regarding potential need for surgical procedure to address staghorn calculus left kidney  · CT scan 7/2/2021 with only 1 remaining left renal stone identified which had decreased in size.  Patient appears to have passed the other stone.  · As above, CT 10/26/2021 with more prominent left hydronephrosis and increase in left perinephric and periureteral stranding.  Felt to possibly be related to passage of recent stone versus partial obstruction secondary to debris or thrombus in the left ureter versus pyelonephritis.  Patient however with  no clinical evidence of recent stone passage nor pyelonephritis. Malignancy felt to be the cause of CT changes around the left kidney at this point.   · Left ureteral stent replacement 12/16/2021  · PET scan 1/11/2022 with decrease in fat injection near left renal pelvis, stent in satisfactory position.  Mild residual hydronephrosis on the left.  · Ureteral stent replaced on 3/10/2022  · PET scan 4/19/2022 with no hydronephrosis, left ureteral stent in place    *Right thyroid nodules  · Patient underwent prior thyroid biopsy by her report with benign findings many years ago, records not available  · On MRI cervical spine 11/18/2021, finding of 1.8 cm right thyroid nodule  · Thyroid ultrasound 11/26/2021 with right-sided thyroid nodules, 1 nodule was hypoechoic, solid measuring 2 cm with biopsy recommended.  · Thyroid ultrasound results reviewed with patient.  In light of her metastatic breast cancer, renal failure the significance of the thyroid nodule is felt to be much less.  We discussed possibility of thyroid biopsy however patient is inclined to forego this, especially since she has had a biopsy performed in the past with benign findings by her report.    · No hypermetabolic activity identified on PET scan 1/11/2022 in the neck    *Hospitalization 1/28-1/30/2022 due to COVID-19 infection and C. difficile colitis  · Patient fully vaccinated with 3 doses Moderna COVID-19 vaccine  · Patient developed symptoms 1/26/2022 with abrupt onset sinus congestion, mild cough, subsequently developed nausea and diarrhea on 1/27/2022.  Significant fatigue.  · Patient tested positive in emergency department on 1/28/2022 and was admitted  · Patient maintained O2 saturation in mid 90% range on room air  · Patient received remdesivir  · Patient was also found to be positive for C. difficile colitis, received course of oral vancomycin.  · Symptoms from both COVID-19 and C. difficile colitis ultimately resolved.    Plan:  1. Proceed  with cycle 8 -day 1 Faslodex 500 mg IM injection today  2. Resume Ibrance Ibrance 100 mg daily for 7 days on followed by 7 days off (patient recently held Ibrance during course of lumbar spine radiation.).  3. Hold Xgeva today due to severe hypocalcemia.  Nephrology making adjustment in her dialysate  4. No current need for transfusion  5. Continue oral folic acid 1 mg daily, B12 1000 mcg daily.  6. The patient is continuing on hemodialysis every Monday and Friday and is receiving Epogen 20,000 units with each dialysis per nephrology.  Patient is also receiving IV iron every Monday with dialysis.  7. Patient is aware that if she has an attempted break from hemodialysis she would need to continue on BEAU at least weekly either with nephrology or through our office with Procrit.  8. In 2 weeks CBC and RN review  9. In 4 weeks nurse practitioner visit with CBC, CMP, magnesium, phosphorus and cycle 9 Faslodex.  Consider resuming Xgeva pending calcium level.  10. In 5 weeks PET scan  11. In 6 weeks MD visit with CBC, CMP    Patient continues on high risk medication requiring intensive monitoring.    I did spend 40 minutes in total time caring for patient today, time spent in review of records, face-to-face consultation, coordination of care, placement of orders, completion of documentation.

## 2022-06-16 ENCOUNTER — LAB (OUTPATIENT)
Dept: LAB | Facility: HOSPITAL | Age: 62
End: 2022-06-16

## 2022-06-16 ENCOUNTER — INFUSION (OUTPATIENT)
Dept: ONCOLOGY | Facility: HOSPITAL | Age: 62
End: 2022-06-16

## 2022-06-16 ENCOUNTER — OFFICE VISIT (OUTPATIENT)
Dept: ONCOLOGY | Facility: CLINIC | Age: 62
End: 2022-06-16

## 2022-06-16 VITALS
BODY MASS INDEX: 45.99 KG/M2 | HEIGHT: 67 IN | SYSTOLIC BLOOD PRESSURE: 95 MMHG | OXYGEN SATURATION: 97 % | HEART RATE: 68 BPM | DIASTOLIC BLOOD PRESSURE: 56 MMHG | TEMPERATURE: 97.1 F | RESPIRATION RATE: 18 BRPM | WEIGHT: 293 LBS

## 2022-06-16 DIAGNOSIS — C50.919 METASTATIC BREAST CANCER: ICD-10-CM

## 2022-06-16 DIAGNOSIS — C50.919 METASTATIC BREAST CANCER: Primary | ICD-10-CM

## 2022-06-16 LAB
ALBUMIN SERPL-MCNC: 3.7 G/DL (ref 3.5–5.2)
ALBUMIN/GLOB SERPL: 1 G/DL (ref 1.1–2.4)
ALP SERPL-CCNC: 85 U/L (ref 38–116)
ALT SERPL W P-5'-P-CCNC: 18 U/L (ref 0–33)
ANION GAP SERPL CALCULATED.3IONS-SCNC: 15.9 MMOL/L (ref 5–15)
AST SERPL-CCNC: 23 U/L (ref 0–32)
BASOPHILS # BLD AUTO: 0.05 10*3/MM3 (ref 0–0.2)
BASOPHILS NFR BLD AUTO: 1 % (ref 0–1.5)
BILIRUB SERPL-MCNC: 0.3 MG/DL (ref 0.2–1.2)
BUN SERPL-MCNC: 51 MG/DL (ref 6–20)
BUN/CREAT SERPL: 10.8 (ref 7.3–30)
CALCIUM SPEC-SCNC: 6.4 MG/DL (ref 8.5–10.2)
CANCER AG15-3 SERPL-ACNC: 23.6 U/ML
CHLORIDE SERPL-SCNC: 100 MMOL/L (ref 98–107)
CO2 SERPL-SCNC: 21.1 MMOL/L (ref 22–29)
CREAT SERPL-MCNC: 4.74 MG/DL (ref 0.6–1.1)
DEPRECATED RDW RBC AUTO: 64 FL (ref 37–54)
EGFRCR SERPLBLD CKD-EPI 2021: 9.8 ML/MIN/1.73
EOSINOPHIL # BLD AUTO: 0.3 10*3/MM3 (ref 0–0.4)
EOSINOPHIL NFR BLD AUTO: 6.1 % (ref 0.3–6.2)
ERYTHROCYTE [DISTWIDTH] IN BLOOD BY AUTOMATED COUNT: 16.4 % (ref 12.3–15.4)
GLOBULIN UR ELPH-MCNC: 3.6 GM/DL (ref 1.8–3.5)
GLUCOSE SERPL-MCNC: 195 MG/DL (ref 74–124)
HCT VFR BLD AUTO: 27.6 % (ref 34–46.6)
HGB BLD-MCNC: 8.4 G/DL (ref 12–15.9)
IMM GRANULOCYTES # BLD AUTO: 0.05 10*3/MM3 (ref 0–0.05)
IMM GRANULOCYTES NFR BLD AUTO: 1 % (ref 0–0.5)
LYMPHOCYTES # BLD AUTO: 0.45 10*3/MM3 (ref 0.7–3.1)
LYMPHOCYTES NFR BLD AUTO: 9.2 % (ref 19.6–45.3)
MAGNESIUM SERPL-MCNC: 1.8 MG/DL (ref 1.8–2.5)
MCH RBC QN AUTO: 32.4 PG (ref 26.6–33)
MCHC RBC AUTO-ENTMCNC: 30.4 G/DL (ref 31.5–35.7)
MCV RBC AUTO: 106.6 FL (ref 79–97)
MONOCYTES # BLD AUTO: 0.5 10*3/MM3 (ref 0.1–0.9)
MONOCYTES NFR BLD AUTO: 10.2 % (ref 5–12)
NEUTROPHILS NFR BLD AUTO: 3.54 10*3/MM3 (ref 1.7–7)
NEUTROPHILS NFR BLD AUTO: 72.5 % (ref 42.7–76)
NRBC BLD AUTO-RTO: 0 /100 WBC (ref 0–0.2)
PHOSPHATE SERPL-MCNC: 4.6 MG/DL (ref 2.5–4.5)
PLATELET # BLD AUTO: 172 10*3/MM3 (ref 140–450)
PMV BLD AUTO: 8.6 FL (ref 6–12)
POTASSIUM SERPL-SCNC: 4.8 MMOL/L (ref 3.5–4.7)
PROT SERPL-MCNC: 7.3 G/DL (ref 6.3–8)
RBC # BLD AUTO: 2.59 10*6/MM3 (ref 3.77–5.28)
SODIUM SERPL-SCNC: 137 MMOL/L (ref 134–145)
WBC NRBC COR # BLD: 4.89 10*3/MM3 (ref 3.4–10.8)

## 2022-06-16 PROCEDURE — 85025 COMPLETE CBC W/AUTO DIFF WBC: CPT

## 2022-06-16 PROCEDURE — 36415 COLL VENOUS BLD VENIPUNCTURE: CPT

## 2022-06-16 PROCEDURE — 96402 CHEMO HORMON ANTINEOPL SQ/IM: CPT

## 2022-06-16 PROCEDURE — 99215 OFFICE O/P EST HI 40 MIN: CPT | Performed by: INTERNAL MEDICINE

## 2022-06-16 PROCEDURE — 86300 IMMUNOASSAY TUMOR CA 15-3: CPT | Performed by: INTERNAL MEDICINE

## 2022-06-16 PROCEDURE — 80053 COMPREHEN METABOLIC PANEL: CPT

## 2022-06-16 PROCEDURE — 84100 ASSAY OF PHOSPHORUS: CPT

## 2022-06-16 PROCEDURE — 83735 ASSAY OF MAGNESIUM: CPT

## 2022-06-16 PROCEDURE — 25010000002 FULVESTRANT PER 25 MG: Performed by: INTERNAL MEDICINE

## 2022-06-16 RX ADMIN — FULVESTRANT 500 MG: 50 INJECTION, SOLUTION INTRAMUSCULAR at 11:06

## 2022-06-24 LAB
RAD ONC ARIA COURSE END DATE: NORMAL
RAD ONC ARIA COURSE ID: NORMAL
RAD ONC ARIA COURSE INTENT: NORMAL
RAD ONC ARIA COURSE LAST TREATMENT DATE: NORMAL
RAD ONC ARIA COURSE START DATE: NORMAL
RAD ONC ARIA COURSE TREATMENT ELAPSED DAYS: 14
RAD ONC ARIA FIRST TREATMENT DATE: NORMAL
RAD ONC ARIA PLAN FRACTIONS TREATED TO DATE: 10
RAD ONC ARIA PLAN ID: NORMAL
RAD ONC ARIA PLAN NAME: NORMAL
RAD ONC ARIA PLAN PRESCRIBED DOSE PER FRACTION: 3 GY
RAD ONC ARIA PLAN PRIMARY REFERENCE POINT: NORMAL
RAD ONC ARIA PLAN TOTAL FRACTIONS PRESCRIBED: 10
RAD ONC ARIA PLAN TOTAL PRESCRIBED DOSE: 3000 CGY
RAD ONC ARIA REFERENCE POINT DOSAGE GIVEN TO DATE: 30 GY
RAD ONC ARIA REFERENCE POINT DOSAGE GIVEN TO DATE: 30.6 GY
RAD ONC ARIA REFERENCE POINT ID: NORMAL
RAD ONC ARIA REFERENCE POINT ID: NORMAL

## 2022-06-30 ENCOUNTER — CLINICAL SUPPORT (OUTPATIENT)
Dept: ONCOLOGY | Facility: HOSPITAL | Age: 62
End: 2022-06-30

## 2022-06-30 ENCOUNTER — OFFICE VISIT (OUTPATIENT)
Dept: PSYCHIATRY | Facility: HOSPITAL | Age: 62
End: 2022-06-30

## 2022-06-30 ENCOUNTER — LAB (OUTPATIENT)
Dept: LAB | Facility: HOSPITAL | Age: 62
End: 2022-06-30

## 2022-06-30 DIAGNOSIS — F43.21 GRIEF: ICD-10-CM

## 2022-06-30 DIAGNOSIS — R53.83 FATIGUE, UNSPECIFIED TYPE: ICD-10-CM

## 2022-06-30 DIAGNOSIS — F33.41 RECURRENT MAJOR DEPRESSIVE DISORDER, IN PARTIAL REMISSION: Primary | ICD-10-CM

## 2022-06-30 DIAGNOSIS — F41.1 GENERALIZED ANXIETY DISORDER: ICD-10-CM

## 2022-06-30 DIAGNOSIS — G47.33 OSA (OBSTRUCTIVE SLEEP APNEA): ICD-10-CM

## 2022-06-30 DIAGNOSIS — C50.919 METASTATIC BREAST CANCER: ICD-10-CM

## 2022-06-30 LAB
BASOPHILS # BLD AUTO: 0.04 10*3/MM3 (ref 0–0.2)
BASOPHILS NFR BLD AUTO: 1.3 % (ref 0–1.5)
DEPRECATED RDW RBC AUTO: 64.4 FL (ref 37–54)
EOSINOPHIL # BLD AUTO: 0.24 10*3/MM3 (ref 0–0.4)
EOSINOPHIL NFR BLD AUTO: 7.7 % (ref 0.3–6.2)
ERYTHROCYTE [DISTWIDTH] IN BLOOD BY AUTOMATED COUNT: 16.8 % (ref 12.3–15.4)
HCT VFR BLD AUTO: 26.3 % (ref 34–46.6)
HGB BLD-MCNC: 8.1 G/DL (ref 12–15.9)
IMM GRANULOCYTES # BLD AUTO: 0.02 10*3/MM3 (ref 0–0.05)
IMM GRANULOCYTES NFR BLD AUTO: 0.6 % (ref 0–0.5)
LYMPHOCYTES # BLD AUTO: 0.41 10*3/MM3 (ref 0.7–3.1)
LYMPHOCYTES NFR BLD AUTO: 13.1 % (ref 19.6–45.3)
MCH RBC QN AUTO: 32.4 PG (ref 26.6–33)
MCHC RBC AUTO-ENTMCNC: 30.8 G/DL (ref 31.5–35.7)
MCV RBC AUTO: 105.2 FL (ref 79–97)
MONOCYTES # BLD AUTO: 0.33 10*3/MM3 (ref 0.1–0.9)
MONOCYTES NFR BLD AUTO: 10.6 % (ref 5–12)
NEUTROPHILS NFR BLD AUTO: 2.08 10*3/MM3 (ref 1.7–7)
NEUTROPHILS NFR BLD AUTO: 66.7 % (ref 42.7–76)
NRBC BLD AUTO-RTO: 0 /100 WBC (ref 0–0.2)
PLATELET # BLD AUTO: 124 10*3/MM3 (ref 140–450)
PMV BLD AUTO: 8.5 FL (ref 6–12)
RBC # BLD AUTO: 2.5 10*6/MM3 (ref 3.77–5.28)
WBC NRBC COR # BLD: 3.12 10*3/MM3 (ref 3.4–10.8)

## 2022-06-30 PROCEDURE — 99214 OFFICE O/P EST MOD 30 MIN: CPT | Performed by: NURSE PRACTITIONER

## 2022-06-30 PROCEDURE — G0463 HOSPITAL OUTPT CLINIC VISIT: HCPCS

## 2022-06-30 PROCEDURE — 85025 COMPLETE CBC W/AUTO DIFF WBC: CPT

## 2022-06-30 PROCEDURE — 36415 COLL VENOUS BLD VENIPUNCTURE: CPT

## 2022-06-30 RX ORDER — QUETIAPINE FUMARATE 100 MG/1
100 TABLET, FILM COATED ORAL NIGHTLY
Qty: 90 TABLET | Refills: 0 | Status: SHIPPED | OUTPATIENT
Start: 2022-06-30 | End: 2022-08-31 | Stop reason: SDUPTHER

## 2022-06-30 RX ORDER — ARMODAFINIL 250 MG/1
250 TABLET ORAL DAILY
Qty: 30 TABLET | Refills: 2 | Status: SHIPPED | OUTPATIENT
Start: 2022-06-30 | End: 2022-08-04 | Stop reason: SDUPTHER

## 2022-06-30 NOTE — PROGRESS NOTES
Pt present for visit. Voices no new concerns. Confirms restarting Ibrance and was off last week. Plts dropped compared to previous labs. Pt denies issues with bleeding. Offered copy of labs and schedule. Pt v/u.    Lab Results   Component Value Date    WBC 3.12 (L) 06/30/2022    HGB 8.1 (L) 06/30/2022    HCT 26.3 (L) 06/30/2022    .2 (H) 06/30/2022     (L) 06/30/2022

## 2022-07-13 ENCOUNTER — HOSPITAL ENCOUNTER (OUTPATIENT)
Dept: CARDIOLOGY | Facility: HOSPITAL | Age: 62
Discharge: HOME | End: 2022-07-13
Admitting: INTERNAL MEDICINE

## 2022-07-13 VITALS
HEIGHT: 67 IN | WEIGHT: 293 LBS | SYSTOLIC BLOOD PRESSURE: 117 MMHG | HEART RATE: 60 BPM | DIASTOLIC BLOOD PRESSURE: 40 MMHG | OXYGEN SATURATION: 100 % | BODY MASS INDEX: 45.99 KG/M2

## 2022-07-13 DIAGNOSIS — Q23.1 BICUSPID AORTIC VALVE: ICD-10-CM

## 2022-07-13 DIAGNOSIS — I35.0 NONRHEUMATIC AORTIC VALVE STENOSIS: ICD-10-CM

## 2022-07-13 LAB
AORTIC ARCH: 2.8 CM
AORTIC DIMENSIONLESS INDEX: 0.3 (DI)
ASCENDING AORTA: 2.7 CM
BH CV ECHO MEAS - ACS: 1.31 CM
BH CV ECHO MEAS - AO MAX PG: 80.8 MMHG
BH CV ECHO MEAS - AO MEAN PG: 45.9 MMHG
BH CV ECHO MEAS - AO ROOT DIAM: 3.1 CM
BH CV ECHO MEAS - AO V2 MAX: 449.3 CM/SEC
BH CV ECHO MEAS - AO V2 VTI: 118.3 CM
BH CV ECHO MEAS - AVA(I,D): 0.84 CM2
BH CV ECHO MEAS - EDV(CUBED): 152.2 ML
BH CV ECHO MEAS - EDV(MOD-SP2): 177 ML
BH CV ECHO MEAS - EDV(MOD-SP4): 240 ML
BH CV ECHO MEAS - EF(MOD-BP): 59 %
BH CV ECHO MEAS - EF(MOD-SP2): 57.6 %
BH CV ECHO MEAS - EF(MOD-SP4): 59.2 %
BH CV ECHO MEAS - ESV(CUBED): 45.1 ML
BH CV ECHO MEAS - ESV(MOD-SP2): 75 ML
BH CV ECHO MEAS - ESV(MOD-SP4): 98 ML
BH CV ECHO MEAS - FS: 33.3 %
BH CV ECHO MEAS - IVS/LVPW: 0.86 CM
BH CV ECHO MEAS - IVSD: 0.86 CM
BH CV ECHO MEAS - LAT PEAK E' VEL: 6.7 CM/SEC
BH CV ECHO MEAS - LV DIASTOLIC VOL/BSA (35-75): 95.1 CM2
BH CV ECHO MEAS - LV MASS(C)D: 185.2 GRAMS
BH CV ECHO MEAS - LV MAX PG: 6.1 MMHG
BH CV ECHO MEAS - LV MEAN PG: 3.3 MMHG
BH CV ECHO MEAS - LV SYSTOLIC VOL/BSA (12-30): 38.8 CM2
BH CV ECHO MEAS - LV V1 MAX: 123 CM/SEC
BH CV ECHO MEAS - LV V1 VTI: 32.4 CM
BH CV ECHO MEAS - LVIDD: 5.3 CM
BH CV ECHO MEAS - LVIDS: 3.6 CM
BH CV ECHO MEAS - LVOT AREA: 3.1 CM2
BH CV ECHO MEAS - LVOT DIAM: 1.98 CM
BH CV ECHO MEAS - LVPWD: 1 CM
BH CV ECHO MEAS - MED PEAK E' VEL: 7.8 CM/SEC
BH CV ECHO MEAS - MV A DUR: 0.15 SEC
BH CV ECHO MEAS - MV A MAX VEL: 145.7 CM/SEC
BH CV ECHO MEAS - MV DEC SLOPE: 670.7 CM/SEC2
BH CV ECHO MEAS - MV DEC TIME: 0.17 MSEC
BH CV ECHO MEAS - MV E MAX VEL: 164 CM/SEC
BH CV ECHO MEAS - MV E/A: 1.13
BH CV ECHO MEAS - MV MAX PG: 16.2 MMHG
BH CV ECHO MEAS - MV MEAN PG: 6.2 MMHG
BH CV ECHO MEAS - MV P1/2T: 84.3 MSEC
BH CV ECHO MEAS - MV V2 VTI: 61.9 CM
BH CV ECHO MEAS - MVA(P1/2T): 2.6 CM2
BH CV ECHO MEAS - MVA(VTI): 1.6 CM2
BH CV ECHO MEAS - PA ACC TIME: 0.15 SEC
BH CV ECHO MEAS - PA PR(ACCEL): 13.6 MMHG
BH CV ECHO MEAS - PA V2 MAX: 121.6 CM/SEC
BH CV ECHO MEAS - PULM A REVS DUR: 0.11 SEC
BH CV ECHO MEAS - PULM A REVS VEL: 16.2 CM/SEC
BH CV ECHO MEAS - PULM DIAS VEL: 44.8 CM/SEC
BH CV ECHO MEAS - PULM S/D: 1.49
BH CV ECHO MEAS - PULM SYS VEL: 66.8 CM/SEC
BH CV ECHO MEAS - RAP SYSTOLE: 3 MMHG
BH CV ECHO MEAS - RVSP: 38 MMHG
BH CV ECHO MEAS - SI(MOD-SP2): 40.4 ML/M2
BH CV ECHO MEAS - SI(MOD-SP4): 56.3 ML/M2
BH CV ECHO MEAS - SUP REN AO DIAM: 2.2 CM
BH CV ECHO MEAS - SV(LVOT): 99.4 ML
BH CV ECHO MEAS - SV(MOD-SP2): 102 ML
BH CV ECHO MEAS - SV(MOD-SP4): 142 ML
BH CV ECHO MEAS - TR MAX PG: 35.8 MMHG
BH CV ECHO MEAS - TR MAX VEL: 299.1 CM/SEC
BH CV ECHO MEASUREMENTS AVERAGE E/E' RATIO: 22.62
BH CV XLRA - RV BASE: 3.9 CM
BH CV XLRA - RV LENGTH: 7.5 CM
BH CV XLRA - RV MID: 3.4 CM
BH CV XLRA - TDI S': 18.5 CM/SEC
LEFT ATRIUM VOLUME INDEX: 40.1 ML/M2
MAXIMAL PREDICTED HEART RATE: 157 BPM
SINUS: 2.7 CM
STJ: 2.5 CM
STRESS TARGET HR: 133 BPM

## 2022-07-13 PROCEDURE — 25010000002 PERFLUTREN (DEFINITY) 8.476 MG IN SODIUM CHLORIDE (PF) 0.9 % 10 ML INJECTION: Performed by: INTERNAL MEDICINE

## 2022-07-13 PROCEDURE — 93306 TTE W/DOPPLER COMPLETE: CPT

## 2022-07-13 PROCEDURE — 93306 TTE W/DOPPLER COMPLETE: CPT | Performed by: INTERNAL MEDICINE

## 2022-07-13 RX ADMIN — PERFLUTREN 2 ML: 6.52 INJECTION, SUSPENSION INTRAVENOUS at 13:16

## 2022-07-14 ENCOUNTER — OFFICE VISIT (OUTPATIENT)
Dept: ONCOLOGY | Facility: CLINIC | Age: 62
End: 2022-07-14

## 2022-07-14 ENCOUNTER — OFFICE VISIT (OUTPATIENT)
Dept: CARDIOLOGY | Facility: CLINIC | Age: 62
End: 2022-07-14

## 2022-07-14 ENCOUNTER — INFUSION (OUTPATIENT)
Dept: ONCOLOGY | Facility: HOSPITAL | Age: 62
End: 2022-07-14

## 2022-07-14 ENCOUNTER — LAB (OUTPATIENT)
Dept: LAB | Facility: HOSPITAL | Age: 62
End: 2022-07-14

## 2022-07-14 VITALS
RESPIRATION RATE: 16 BRPM | WEIGHT: 293 LBS | OXYGEN SATURATION: 99 % | DIASTOLIC BLOOD PRESSURE: 54 MMHG | HEART RATE: 74 BPM | TEMPERATURE: 98.9 F | HEIGHT: 67 IN | BODY MASS INDEX: 45.99 KG/M2 | SYSTOLIC BLOOD PRESSURE: 97 MMHG

## 2022-07-14 VITALS
OXYGEN SATURATION: 99 % | SYSTOLIC BLOOD PRESSURE: 104 MMHG | DIASTOLIC BLOOD PRESSURE: 56 MMHG | HEIGHT: 67 IN | WEIGHT: 293 LBS | BODY MASS INDEX: 45.99 KG/M2 | HEART RATE: 62 BPM

## 2022-07-14 DIAGNOSIS — C50.919 METASTATIC BREAST CANCER: ICD-10-CM

## 2022-07-14 DIAGNOSIS — N18.9 ANEMIA DUE TO CHRONIC KIDNEY DISEASE, UNSPECIFIED CKD STAGE: ICD-10-CM

## 2022-07-14 DIAGNOSIS — E66.01 OBESITY, MORBID, BMI 50 OR HIGHER: ICD-10-CM

## 2022-07-14 DIAGNOSIS — Q23.1 BICUSPID AORTIC VALVE: Primary | ICD-10-CM

## 2022-07-14 DIAGNOSIS — I35.0 AORTIC STENOSIS, SEVERE: ICD-10-CM

## 2022-07-14 DIAGNOSIS — E83.51 HYPOCALCEMIA: ICD-10-CM

## 2022-07-14 DIAGNOSIS — C50.919 METASTATIC BREAST CANCER: Primary | ICD-10-CM

## 2022-07-14 DIAGNOSIS — D63.1 ANEMIA DUE TO CHRONIC KIDNEY DISEASE, UNSPECIFIED CKD STAGE: ICD-10-CM

## 2022-07-14 DIAGNOSIS — I50.32 DIASTOLIC CHF, CHRONIC: ICD-10-CM

## 2022-07-14 DIAGNOSIS — N18.6 ESRD (END STAGE RENAL DISEASE): ICD-10-CM

## 2022-07-14 DIAGNOSIS — D63.8 ANEMIA, CHRONIC DISEASE: ICD-10-CM

## 2022-07-14 LAB
ABO GROUP BLD: NORMAL
ALBUMIN SERPL-MCNC: 3.6 G/DL (ref 3.5–5.2)
ALBUMIN/GLOB SERPL: 1.1 G/DL (ref 1.1–2.4)
ALP SERPL-CCNC: 79 U/L (ref 38–116)
ALT SERPL W P-5'-P-CCNC: 8 U/L (ref 0–33)
ANION GAP SERPL CALCULATED.3IONS-SCNC: 13.9 MMOL/L (ref 5–15)
ANTI-E: NORMAL
AST SERPL-CCNC: 13 U/L (ref 0–32)
BASOPHILS # BLD AUTO: 0.03 10*3/MM3 (ref 0–0.2)
BASOPHILS NFR BLD AUTO: 1.1 % (ref 0–1.5)
BILIRUB SERPL-MCNC: 0.4 MG/DL (ref 0.2–1.2)
BLD GP AB SCN SERPL QL: POSITIVE
BUN SERPL-MCNC: 49 MG/DL (ref 6–20)
BUN/CREAT SERPL: 10.8 (ref 7.3–30)
CALCIUM SPEC-SCNC: 7.7 MG/DL (ref 8.5–10.2)
CHLORIDE SERPL-SCNC: 91 MMOL/L (ref 98–107)
CO2 SERPL-SCNC: 23.1 MMOL/L (ref 22–29)
CREAT SERPL-MCNC: 4.53 MG/DL (ref 0.6–1.1)
DEPRECATED RDW RBC AUTO: 65.3 FL (ref 37–54)
E AG RBC QL: NEGATIVE
EGFRCR SERPLBLD CKD-EPI 2021: 10.4 ML/MIN/1.73
EOSINOPHIL # BLD AUTO: 0.11 10*3/MM3 (ref 0–0.4)
EOSINOPHIL NFR BLD AUTO: 4 % (ref 0.3–6.2)
ERYTHROCYTE [DISTWIDTH] IN BLOOD BY AUTOMATED COUNT: 17.7 % (ref 12.3–15.4)
GLOBULIN UR ELPH-MCNC: 3.4 GM/DL (ref 1.8–3.5)
GLUCOSE SERPL-MCNC: 160 MG/DL (ref 74–124)
HCT VFR BLD AUTO: 19.8 % (ref 34–46.6)
HGB BLD-MCNC: 6.4 G/DL (ref 12–15.9)
IMM GRANULOCYTES # BLD AUTO: 0.02 10*3/MM3 (ref 0–0.05)
IMM GRANULOCYTES NFR BLD AUTO: 0.7 % (ref 0–0.5)
LITTLE C: POSITIVE
LYMPHOCYTES # BLD AUTO: 0.44 10*3/MM3 (ref 0.7–3.1)
LYMPHOCYTES NFR BLD AUTO: 15.8 % (ref 19.6–45.3)
MAGNESIUM SERPL-MCNC: 1.7 MG/DL (ref 1.8–2.5)
MCH RBC QN AUTO: 33.3 PG (ref 26.6–33)
MCHC RBC AUTO-ENTMCNC: 32.3 G/DL (ref 31.5–35.7)
MCV RBC AUTO: 103.1 FL (ref 79–97)
MONOCYTES # BLD AUTO: 0.34 10*3/MM3 (ref 0.1–0.9)
MONOCYTES NFR BLD AUTO: 12.2 % (ref 5–12)
NEUTROPHILS NFR BLD AUTO: 1.84 10*3/MM3 (ref 1.7–7)
NEUTROPHILS NFR BLD AUTO: 66.2 % (ref 42.7–76)
NONSPECIFIC GEL REACTION: NORMAL
NRBC BLD AUTO-RTO: 0 /100 WBC (ref 0–0.2)
PHOSPHATE SERPL-MCNC: 3.9 MG/DL (ref 2.5–4.5)
PLATELET # BLD AUTO: 218 10*3/MM3 (ref 140–450)
PMV BLD AUTO: 8.6 FL (ref 6–12)
POTASSIUM SERPL-SCNC: 4.2 MMOL/L (ref 3.5–4.7)
PROT SERPL-MCNC: 7 G/DL (ref 6.3–8)
RBC # BLD AUTO: 1.92 10*6/MM3 (ref 3.77–5.28)
RH BLD: POSITIVE
SODIUM SERPL-SCNC: 128 MMOL/L (ref 134–145)
T&S EXPIRATION DATE: NORMAL
WBC NRBC COR # BLD: 2.78 10*3/MM3 (ref 3.4–10.8)

## 2022-07-14 PROCEDURE — 86922 COMPATIBILITY TEST ANTIGLOB: CPT

## 2022-07-14 PROCEDURE — 99214 OFFICE O/P EST MOD 30 MIN: CPT | Performed by: NURSE PRACTITIONER

## 2022-07-14 PROCEDURE — 86902 BLOOD TYPE ANTIGEN DONOR EA: CPT

## 2022-07-14 PROCEDURE — 83735 ASSAY OF MAGNESIUM: CPT

## 2022-07-14 PROCEDURE — 86900 BLOOD TYPING SEROLOGIC ABO: CPT

## 2022-07-14 PROCEDURE — 86850 RBC ANTIBODY SCREEN: CPT

## 2022-07-14 PROCEDURE — 80053 COMPREHEN METABOLIC PANEL: CPT

## 2022-07-14 PROCEDURE — 86905 BLOOD TYPING RBC ANTIGENS: CPT

## 2022-07-14 PROCEDURE — 86901 BLOOD TYPING SEROLOGIC RH(D): CPT

## 2022-07-14 PROCEDURE — 86920 COMPATIBILITY TEST SPIN: CPT

## 2022-07-14 PROCEDURE — 84100 ASSAY OF PHOSPHORUS: CPT

## 2022-07-14 PROCEDURE — 85025 COMPLETE CBC W/AUTO DIFF WBC: CPT

## 2022-07-14 PROCEDURE — 25010000002 FULVESTRANT PER 25 MG: Performed by: NURSE PRACTITIONER

## 2022-07-14 PROCEDURE — 96402 CHEMO HORMON ANTINEOPL SQ/IM: CPT

## 2022-07-14 PROCEDURE — 86870 RBC ANTIBODY IDENTIFICATION: CPT

## 2022-07-14 PROCEDURE — 36415 COLL VENOUS BLD VENIPUNCTURE: CPT

## 2022-07-14 RX ORDER — DIPHENHYDRAMINE HCL 25 MG
25 CAPSULE ORAL ONCE
Status: CANCELLED | OUTPATIENT
Start: 2022-08-20 | End: 2022-07-14

## 2022-07-14 RX ORDER — SODIUM CHLORIDE 9 MG/ML
250 INJECTION, SOLUTION INTRAVENOUS AS NEEDED
Status: CANCELLED | OUTPATIENT
Start: 2022-08-20

## 2022-07-14 RX ORDER — ACETAMINOPHEN 325 MG/1
650 TABLET ORAL ONCE
Status: DISCONTINUED | OUTPATIENT
Start: 2022-07-14 | End: 2022-07-16 | Stop reason: HOSPADM

## 2022-07-14 RX ORDER — ACETAMINOPHEN 325 MG/1
650 TABLET ORAL ONCE
Status: CANCELLED | OUTPATIENT
Start: 2022-07-14 | End: 2022-07-14

## 2022-07-14 RX ADMIN — FULVESTRANT 500 MG: 250 INJECTION INTRAMUSCULAR at 10:35

## 2022-07-14 NOTE — PROGRESS NOTES
Reviewed Calcium of 7.7 with Ana PA and hold Xgeva today.  Patient given copy of labs to take to dialysis tomorrow with sodium level of 128.

## 2022-07-14 NOTE — PROGRESS NOTES
"Chief Complaint  Metastatic lobular breast cancer (ER/IL positive, HER-2/tj negative), anemia secondary to CKD3, CHF, peripheral neuropathy, chemotherapy related nausea chemotherapy-induced myelosuppression    Subjective        History of Present Illness   Patient is a 61-year-old female with the above-mentioned history here today for lab review and evaluation due for monthly Faslodex and possible Xgeva.  She also continues on Ibrance 100 mg daily for 7 days on, followed by 7 days off.  She just started a cycle of Ibrance today.  The past few days, she has felt more short of breath, tired, and achy.  She feels this way typically when she is anemic.  The patient does continue on hemodialysis on Mondays and Fridays.  She reports her appetite is good.  She denies any complaints of pain.  She reports her bowels are moving okay.       Objective   Vital Signs:   BP 97/54 Comment: right forearm  Pulse 74   Temp 98.9 °F (37.2 °C) (Temporal)   Resp 16   Ht 170.2 cm (67\")   Wt (!) 163 kg (359 lb 5.6 oz) Comment: per patient  SpO2 99%   BMI 56.28 kg/m²     Physical Exam  Vitals reviewed.   Constitutional:       General: She is not in acute distress.     Appearance: Normal appearance. She is well-developed.      Comments: Patient in a motorized scooter   HENT:      Head: Normocephalic and atraumatic.   Eyes:      Pupils: Pupils are equal, round, and reactive to light.   Cardiovascular:      Rate and Rhythm: Normal rate and regular rhythm.      Heart sounds: Murmur heard.    Systolic murmur is present with a grade of 2/6.  Pulmonary:      Effort: Pulmonary effort is normal. No respiratory distress.      Breath sounds: Normal breath sounds. No wheezing, rhonchi or rales.   Abdominal:      General: Bowel sounds are normal. There is no distension.      Palpations: Abdomen is soft.   Musculoskeletal:         General: Swelling (With chronic venous stasis changes) present. Normal range of motion.      Cervical back: Normal " range of motion.   Skin:     General: Skin is warm and dry.      Coloration: Skin is pale.      Findings: No rash.   Neurological:      Mental Status: She is alert and oriented to person, place, and time.        Patient was examined today, unchanged from above    Result Review :   CBC and Differential (07/14/2022 09:18)  Comprehensive metabolic panel (07/14/2022 09:18)  Magnesium (07/14/2022 09:18)  Phosphorus (07/14/2022 09:18)       Assessment and Plan    *Metastatic lobular breast cancer (ER/WA positive, HER-2/tj negative):  · Previous stage IIIC right breast cancer in 2003, received neoadjuvant chemotherapy (unknown regimen) with subsequent bilateral mastectomies 1/22/2004 with right axillary dissection.  Residual 10-12 cm invasive lobular carcinoma on the right breast with 14/16+ nodes with extranodal extension.  Received adjuvant carboplatin and Navelbine chemotherapy x4 cycles  · Attempted adjuvant radiation to the chest wall however interrupted due to cellulitis that required removal of implants in August 2004  · Received tamoxifen from 6/22/2004 until June 2009.  Transitioned to Femara and received until June 2014  · Identification of CT findings concerning for carcinomatosis on CT scans and June 2019.  · PET scan confirmed hypermetabolic activity in the omentum as well as small retroperitoneal lymph nodes.  · CT-guided omental biopsy 7/30/2019 with metastatic lobular carcinoma of breast primary, ER positive (greater than 95%), WA positive (90%), HER-2/tj negative (1+ IHC)  · [facility] liquid biopsy 2/5/2020: MSI undetermined, mutations in BETH, NF1, CHEK2.  No evidence of PIK3CA mutation.  · Subsequent [facility] germline testing 11/12/2021 with BETH VUS (c.2864C>A) and POLE VUS (c.631A>T)  · Initiation of Aromasin and Ibrance in August 2019  · Difficulty with myelosuppression related to Ibrance requiring dose alterations, eventually changed to 100 mg for 7 days on followed by 7 days off.  · CT  chest abdomen pelvis 10/26/2021 with increase in left hydronephrosis with increase in left perinephric and periureteral stranding. Decrease in stone in the left kidney lower pole (7 mm).  Stable small retroperitoneal lymph and left periaortic lymph nodes.  · PET scan 11/10/2021 with multiple bilateral hypermetabolic nodular pulmonary lesions, asymmetric moderate to severe left hydronephrosis with ill-defined fat stranding and soft tissue thickening in the renal sinus and surrounding the left ureter, hypermetabolic left retroperitoneal lymph node with slight increase in size, ill-defined hypermetabolic thickening in the inferior omentum with increase in size, uptake and C7 (indeterminate, recommended MRI).  Short segments of uptake in the mid and distal esophagus possibly related to gastritis.  · PET scan findings felt to be consistent with progressive malignancy.  Patient declined repeat omental biopsy.   · Options for further treatment discussed on 11/12/2021.  In light of renal failure and dialysis, options are more limited.  Recommendation to continue Ibrance and discontinue Aromasin, begin Faslodex.  Discussed possible future use of single agent Taxol.    · Aromasin discontinued 11/12/2021  · On 11/22/2021 initiation of Faslodex with continuation of Ibrance (100 mg daily for 7 days on followed by 7 days off).  · MRI cervical spine 11/18/2021 showed the right C7 normality to likely represent metastatic disease.  With solitary bone lesion, did not recommend pursuit of bone modifying agent.  · Following 2 cycles Faslodex/Ibrance, PET scan on 1/11/2022 showed no change in the soft tissue superior to the dome of the bladder with hypermetabolic activity (likely related to carcinomatosis).  Significant decrease in injection of fat around the left renal pelvis and stable retroperitoneal hypermetabolic lymph nodes, decrease in size and activity in small bilateral pulmonary nodules.  Findings felt to represent evidence of  response to treatment.    · Ibrance held briefly beginning 1/28/2022 due to hospitalization with COVID-19 infection and C. difficile colitis.  Patient developed leukopenia/neutropenia.  Ibrance resumed at previous dosing (100 mg daily 7 days on followed by 7 days off) on 2/9/22.  · Following 5 cycles Faslodex/Ibrance PET scan 4/19/2022 with evidence of mixed response.  New hypermetabolic lesion spinous process L2 (SUV 5.1) and slight increase in activity left clavicular metastasis (SUV increased 4.6-8.1).  C7 hypermetabolic mixed lytic and blastic bone lesion was unchanged.  Decrease in uptake involving omental thickening.  Stable soft tissue thickening around the left renal pelvis and ureter.  Discussion again regarding potential pursuit of IV chemotherapy with Taxol versus continuation of Ibrance and Faslodex with radiation to sites of bony disease at L2, left clavicle, C7.  Patient opted to defer initiation of systemic chemotherapy and continue Ibrance/Faslodex with consideration of radiation.  · Initiated monthly Xgeva on 5/19/2022 (cleared with nephrology)  · Palliative radiation received to lumbar spine regarding L2 metastasis 5/23- 6/7/2022  · Ibrance held during radiation therapy beginning 5/22/2022.  Ibrance resumed 6/16/2022.  · Second dose Xgeva held on 6/16/2022 due to severe hypocalcemia, calcium 6.4.  Nephrology notified and adjustment made in dialysate.  · Patient returns today 7/14/2022 for her monthly Faslodex injection.  She continues on Ibrance 100 mg daily for 7 days on, followed by 7 days off.  She just started another cycle of Ibrance today.  Her hemoglobin has declined today to 6.4 and we will schedule her for a transfusion.  Also, her calcium level remains low at 7.7.  We will again hold Xgeva today.  Sodium levels also declined to 128.  Patient will be given a copy of her labs to take with her to dialysis tomorrow.    · She is currently scheduled for follow-up scans next week.      *Anemia  secondary to ESRD, exacerbated by Ibrance:  · Anemia secondary to ESRD, malignancy with exacerbation by use of Ibrance  · Previous evidence of folate deficiency 8/12/2019 with level 3.84, initiated folic acid 1 mg daily  · Previous evidence of iron deficiency, received Injectafer on 8/7/2020 750 mg IV x1 dose.  Second dose not administered due to onset of fever, subsequent iron studies precluded further administration of IV iron.  · Previous low normal B12 level initiated oral B12 1000 mcg daily.  · Patient had previously received Procrit and required intermittent transfusion support  · Following initiation of hemodialysis, management of BEAU transitioned to nephrology, receiving Epogen with dialysis.  · Ongoing transfusion support prompted alteration in Ibrance dosing on 8/23/2021.  · After alteration in Ibrance dosing, hemoglobin improved and remained stable in the 9-10 range.  · Improved but ongoing intermittent need for transfusion support despite Ibrance dose alteration  · Stool negative for occult blood x3 on 11/2/2021  · Patient with reduced dialysis frequency to 2 days/week with concurrent decrease in Epogen dosing corresponding again to increased transfusion requirement.  · Epogen dose increased per nephrology to 20,000 units twice weekly with dialysis on Mondays and Fridays  · Ibrance again exacerbating anemia with ongoing intermittent transfusion requirements  · Additional transfusions required with hemoglobin on 3/31/2022 6.6, hemoglobin 4/21/2022 of 6.4, hemoglobin on 5/5/2022 of 6.8.  · 6/16/2022 stable at 8.4.  Recent stability of hemoglobin is related to holding Ibrance.  She is resuming Ibrance today and anticipate that this will exacerbate her anemia in the near future likely requiring additional transfusion support.  She is continuing on Epogen 20,000 units Mondays and Fridays with dialysis as well as IV iron on Mondays.  · 7/14/2022 hemoglobin is declined to 6.4.  We will schedule the patient for  transfusion of 2 units packed red blood cells.    *ESRD on HD:  · Patient with previous CKD3/4  · Hospitalization 4/29-5/4/2021 with RYAN/CKD, UTI, anemia.  Required initiation of hemodialysis Tuesday Thursday Saturday.   · Decrease in frequency of dialysis to twice per week.  She continues to produce a significant amount of urine.   · Status post left upper extremity AV fistula placement on 11/3/2021 by vascular surgery  · Attempt to hold further dialysis on 1/11/2022 with close monitoring of her laboratory and clinical parameters.  Dialysis quickly resumed due to development of hyperkalemia.  · Patient continues on dialysis 2 days/week on Mondays and Fridays.  Per patient report potential future consideration to hold dialysis again in the future.  Patient is followed closely by Dr. Antonio.      *CHF with mild to moderate aortic stenosis:  · Echocardiogram 7/12/2019 showing ejection fraction 48% with moderate dilation of the LV cavity, global hypokinesis, grade 2 diastolic dysfunction, mild to moderate aortic stenosis, trivial pericardial effusion.  · Echocardiogram 7/19/2021 with ejection fraction 56%, left atrial volume moderately increased, grade 2 diastolic dysfunction, severe calcification aortic valve, moderate aortic stenosis, severe mitral calcification, mild mitral stenosis, marked elevation in RVSP from tricuspid regurgitation.  · Patient notes that she discussed echo results with cardiology and valvular status was felt to be stable in regards to aortic stenosis.    *Left upper and bilateral lower extremity peripheral neuropathy:  · Patient reports that left upper extremity neuropathy was not present from previous chemotherapy but occurred after hospitalization that required intubation and critical care stay.  Apparently felt to represent critical care neuropathy.  Improved over time.  · Bilateral lower extremity neuropathy felt to be related to diabetes  · May have future implications regarding treatment  for breast cancer.  · Patient reports that peripheral neuropathy symptoms continue in the bilateral lower extremities, unchanged.  This will be a consideration with potential future use of Taxol    *Uterine/endometrial activity on PET scan:  · Patient experienced postmenopausal vaginal bleeding in 2013, negative endometrial biopsy and gynecologic evaluation.  · With findings on PET scan with enlarging uterus and some hypermetabolic activity, referred back to Dr. Oliveros in gynecology.    · 9/19/2019 D&C with hysteroscopy by Dr. Oliveros, no significant intraoperative findings, pathologic results with benign findings, no evidence of malignancy.    *Nausea:  · Mild, relieved with Zofran.   · Patient experienced improvement in nausea after initiating hemodialysis  · No recent nausea    *Myelosuppression secondary to Ibrance:  · WBC during hospitalization 1/28-1/30/2022 declined into the 1.9-2.0 range due to concurrent COVID-19 infection.  Ibrance was held briefly.  · Ibrance resumed on 2/9/2022 with WBC up to 4.49, ANC 3.07  · Today, WBC 4.89, ANC 3.54    *Depression  · Patient with history of depression, worsened after the death of her son in November 2021  · With evidence of progressive malignancy in November 2021, patient agreeable to referral to supportive oncology  · Patient was seen in supportive oncology 11/18/2021, Zoloft increased from 50 up to 100 mg daily.  Prescribed armodafinil 50 mg daily however there or issues with insurance coverage  · Patient does continue with difficulties regarding depression that wax and wane    *Left perinephric stranding secondary to malignancy with hydronephrosis:  · CT 4/22/2021 showed interval enlargement of 2 staghorn calculi in the left kidney with persistent left perinephric stranding  · Hospitalization 4/29-5/4/2021 with RYAN/CKD, UTI, anemia.  Required 2 unit PRBC transfusion and initiated hemodialysis Tuesday Thursday Saturday.    · Discussion from urology regarding  potential need for surgical procedure to address staghorn calculus left kidney  · CT scan 7/2/2021 with only 1 remaining left renal stone identified which had decreased in size.  Patient appears to have passed the other stone.  · As above, CT 10/26/2021 with more prominent left hydronephrosis and increase in left perinephric and periureteral stranding.  Felt to possibly be related to passage of recent stone versus partial obstruction secondary to debris or thrombus in the left ureter versus pyelonephritis.  Patient however with no clinical evidence of recent stone passage nor pyelonephritis. Malignancy felt to be the cause of CT changes around the left kidney at this point.   · Left ureteral stent replacement 12/16/2021  · PET scan 1/11/2022 with decrease in fat injection near left renal pelvis, stent in satisfactory position.  Mild residual hydronephrosis on the left.  · Ureteral stent replaced on 3/10/2022  · PET scan 4/19/2022 with no hydronephrosis, left ureteral stent in place    *Right thyroid nodules  · Patient underwent prior thyroid biopsy by her report with benign findings many years ago, records not available  · On MRI cervical spine 11/18/2021, finding of 1.8 cm right thyroid nodule  · Thyroid ultrasound 11/26/2021 with right-sided thyroid nodules, 1 nodule was hypoechoic, solid measuring 2 cm with biopsy recommended.  · Thyroid ultrasound results reviewed with patient.  In light of her metastatic breast cancer, renal failure the significance of the thyroid nodule is felt to be much less.  We discussed possibility of thyroid biopsy however patient is inclined to forego this, especially since she has had a biopsy performed in the past with benign findings by her report.    · No hypermetabolic activity identified on PET scan 1/11/2022 in the neck    *Hospitalization 1/28-1/30/2022 due to COVID-19 infection and C. difficile colitis  · Patient fully vaccinated with 3 doses Moderna COVID-19 vaccine  · Patient  developed symptoms 1/26/2022 with abrupt onset sinus congestion, mild cough, subsequently developed nausea and diarrhea on 1/27/2022.  Significant fatigue.  · Patient tested positive in emergency department on 1/28/2022 and was admitted  · Patient maintained O2 saturation in mid 90% range on room air  · Patient received remdesivir  · Patient was also found to be positive for C. difficile colitis, received course of oral vancomycin.  · Symptoms from both COVID-19 and C. difficile colitis ultimately resolved.    Plan:  1. Proceed with cycle 9-day 1 Faslodex 500 mg IM injection today.  2. Schedule patient for transfusion of 2 units of packed red blood cells.  3. Continue Ibrance 100 mg daily for 7 days on, followed by 7 days off.  4. Hold Xgeva today due to continued hypocalcemia.  5. Continue oral folic acid 1 mg daily, B12 1000 mcg daily.  6. Patient receives hemodialysis every Monday and  Friday, and gets Epogen 20,000 units with each dialysis.  Patient also receives IV iron every Monday with dialysis.    7. Patient scheduled for  PET scan next week.  8. Return in 2 weeks for follow-up with Dr. Irvin with CBC, CMP, and to review PET scan results and further discuss plan of care.    9. Call/return sooner should she develop any new concerns or problems.    Patient continues on high risk medication requiring close monitor for toxicity.

## 2022-07-14 NOTE — PROGRESS NOTES
"    CARDIOLOGY        Patient Name: Juana Hall  :1960  Age: 61 y.o.  Primary Cardiologist: Krish Harkins MD  Encounter Provider:  BETY Jones    Date of Service: 21          CHIEF COMPLAINT / REASON FOR OFFICE VISIT     Congenital bicuspid aortic valve (4 month f/u)      HISTORY OF PRESENT ILLNESS       HPI  Juana Hall is a 61 y.o. female who presents today for 3 month re-evaluation.      Pt has a  history significant for bicuspid aortic valve, mild aortic valve stenosis, mildly dilated ascending aorta, metastatic breast cancer.    Patient reports that from cardiovascular standpoint she has done fairly well over the past 3 months.  She does report that she is receiving blood transfusions as she has a low hemoglobin level of 6.3.  She also participates in hemodialysis every Monday and Friday, nephrologist Dr. Tai Ledesma.  She does report shortness of breath and fatigue that she attributes to her low hemoglobin level.  Denies any chest discomfort, dyspnea with exertion, palpitations, lightheadedness.  Patient uses a motorized wheelchair.  Patient has echocardiogram in clinic.    The following portions of the patient's history were reviewed and updated as appropriate: allergies, current medications, past family history, past medical history, past social history, past surgical history and problem list.      VITAL SIGNS     Visit Vitals  /56 (BP Location: Right arm, Patient Position: Sitting, Cuff Size: Adult) Comment (BP Location): forearm   Pulse 62   Ht 170.2 cm (67\")   Wt (!) 163 kg (359 lb)   LMP  (LMP Unknown)   SpO2 99%   BMI 56.23 kg/m²         Wt Readings from Last 3 Encounters:   22 (!) 163 kg (359 lb)   22 (!) 163 kg (359 lb 5.6 oz)   22 (!) 153 kg (337 lb)     Body mass index is 56.23 kg/m².      REVIEW OF SYSTEMS   Review of Systems   Constitutional: Positive for malaise/fatigue. Negative for chills, fever, weight gain and weight loss. "   Cardiovascular: Negative for dyspnea on exertion and leg swelling.   Respiratory: Positive for shortness of breath. Negative for cough, snoring and wheezing.    Hematologic/Lymphatic: Negative for bleeding problem. Does not bruise/bleed easily.   Skin: Negative for color change.   Musculoskeletal: Negative for falls, joint pain and myalgias.   Gastrointestinal: Negative for melena.   Genitourinary: Negative for hematuria.   Neurological: Negative for excessive daytime sleepiness.   Psychiatric/Behavioral: Negative for depression. The patient is not nervous/anxious.            PHYSICAL EXAMINATION     Vitals and nursing note reviewed.   Constitutional:       Appearance: Normal appearance. Well-developed.   Eyes:      Conjunctiva/sclera: Conjunctivae normal.   Neck:      Comments: Unable to assess for carotid bruit secondary to harsh radiating murmur.  Pulmonary:      Breath sounds: Normal breath sounds.   Cardiovascular:      Normal rate. Regular rhythm. Normal S1 with normal intensity. Normal S2 with normal intensity.      Murmurs: There is a grade 4/6 harsh midsystolic murmur at the URSB and ULSB, radiating to the neck.      No gallop. No click. No rub.   Musculoskeletal: Normal range of motion. Skin:     General: Skin is warm and dry.   Neurological:      Mental Status: Alert and oriented to person, place, and time.      GCS: GCS eye subscore is 4. GCS verbal subscore is 5. GCS motor subscore is 6.   Psychiatric:         Speech: Speech normal.         Behavior: Behavior normal.         Thought Content: Thought content normal.         Judgment: Judgment normal.           REVIEWED DATA     Procedures    Cardiac Procedures:  1. Echocardiogram on 10/23/2017: The ejection fraction was low normal at 50-55%. There was mild LVH.  There was grade 1 diastolic dysfunction.  The left atrium was mildly dilated.  A bicuspid aortic valve could not be excluded.  The aortic valve leaflets were heavily calcified.  There was mild  aortic stenosis with a peak gradient of 19 mmHg, and a mean gradient of 11 mmHg.  2. CT scan of the chest without contrast on 12/13/2017: The ascending aorta was mildly dilated at 3.8 cm.  There was no aortic aneurysm.  3. Echocardiogram on 7/12/2019: The left ventricle was mildly to moderately enlarged.  There was borderline global hypokinesis.  The calculated ejection fraction was 48%.  There was grade 2 diastolic dysfunction.  The right ventricle was normal.  The aortic valve was bicuspid and heavily calcified.  There was mild to moderate aortic stenosis with a peak pressure of 28 mmHg and a mean pressure of 17 mmHg.  There were several calcified chordae.  There was mild mitral regurgitation.  4. Echocardiogram 7/19/2021.  LVEF 55%.  Grade 2 diastolic dysfunction.  Severe calcification of aortic valve with no aortic valve regurgitation.  Moderate aortic valve stenosis with maximum pressure gradient 50.1 mmHg, mean pressure gradient 32 mmHg.  Severe MAC with trace regurgitation and mild stenosis.  Calculated RVSP 58 mmHg.  5. Echocardiogram 7/13/2022.  LVEF 56-60%.  Grade 2 diastolic dysfunction.  Normal RV cavity size and systolic function.  Severe aortic valve stenosis.  Maximum pressure gradient 80.8 mmHg, mean pressure gradient 45 mmHg.  Severe MAC of mitral valve.  Moderate mitral valve stenosis.  Peak gradient 16.2 mmHg.  Calculated RVSP 38 mmHg.    Lipid Panel    Lipid Panel 2/16/22   Total Cholesterol 122   Triglycerides 185 (A)   HDL Cholesterol 37 (A)   VLDL Cholesterol 31   LDL Cholesterol  54   (A) Abnormal value                ASSESSMENT & PLAN     Diagnoses and all orders for this visit:    1. Bicuspid aortic valve (Primary)  2. Aortic stenosis, severe  · Gradients across the bicuspid aortic valve are now in the severe stage.  Peak gradient 80.8 mmHg, mean gradient 45.9 mmHg.  · Patient with generalized fatigue and dyspnea.  This could be secondary to hemoglobin level of 6.3.  · Referral to  structural valve team.    3. Diastolic CHF, chronic ([facility])  · Appears euvolemic on exam.  Reports generalized fatigue and dyspnea, could be secondary to anemia.  · Continue carvedilol 3.125 mg twice per day  · Volume status controlled with dialysis    4. Obesity, morbid, BMI 50 or higher ([facility])  · Patient utilizes motorized wheelchair    5. ESRD (end stage renal disease) ([facility])  · Hemodialysis every Monday and Friday  · Nephrologist Dr. Tai Ledesma    6. Anemia due to chronic kidney disease, unspecified CKD stage  · Hemoglobin 6.3  · Plan for PRBC transfusion tomorrow    Return for Structural valve team.        MEDICATIONS         Discharge Medications          Accurate as of July 14, 2022 11:55 AM. If you have any questions, ask your nurse or doctor.            Changes to Medications      Instructions Start Date   amoxicillin 500 MG capsule  Commonly known as: AMOXIL  What changed: See the new instructions.   TAKE 4 CAPSULES BY MOUTH 1 HOUR PRIOR TO DENTAL APPOINTMENT         Continue These Medications      Instructions Start Date   acetaminophen 500 MG tablet  Commonly known as: TYLENOL   500 mg, Oral, As Needed      amLODIPine 5 MG tablet  Commonly known as: NORVASC   5 mg, Oral, Daily      Armodafinil 250 MG tablet   250 mg, Oral, Daily      aspirin 81 MG EC tablet   81 mg, Oral, Daily, HELD FOR SURGERY      atorvastatin 40 MG tablet  Commonly known as: LIPITOR   40 mg, Oral, Nightly      carvedilol 3.125 MG tablet  Commonly known as: COREG   3.125 mg, Oral, 2 Times Daily      folic acid 1 MG tablet  Commonly known as: FOLVITE   TAKE ONE TABLET BY MOUTH DAILY      folic acid 1 MG tablet  Commonly known as: FOLVITE   1 mg, Oral, Daily      Levemir FlexTouch 100 UNIT/ML injection  Generic drug: insulin detemir   40 Units, Subcutaneous, Daily      levothyroxine 50 MCG tablet  Commonly known as: SYNTHROID, LEVOTHROID   50 mcg, Oral, Daily      ondansetron 8 MG tablet  Commonly known as: Zofran   8 mg, Oral, Every 8  Hours PRN      Palbociclib 100 MG tablet tablet   100 mg, Oral, Daily, Take for 7 days on, then 7 days off.      QUEtiapine 100 MG tablet  Commonly known as: SEROquel   100 mg, Oral, Nightly      sertraline 100 MG tablet  Commonly known as: ZOLOFT   TAKE 1 AND 1/2 TABLET BY MOUTH DAILY      vitamin B-12 1000 MCG tablet  Commonly known as: CYANOCOBALAMIN   1,000 mcg, Oral, Daily      vitamin D 1.25 MG (05926 UT) capsule capsule  Commonly known as: ERGOCALCIFEROL   50,000 Units, Oral, Every 7 Days, WEDNESDAY                 **Dragon Disclaimer:   Much of this encounter note is an electronic transcription/translation of spoken language to printed text. The electronic translation of spoken language may permit erroneous, or at times, nonsensical words or phrases to be inadvertently transcribed. Although I have reviewed the note for such errors, some may still exist.

## 2022-07-15 ENCOUNTER — TELEPHONE (OUTPATIENT)
Dept: CARDIOLOGY | Facility: HOSPITAL | Age: 62
End: 2022-07-15

## 2022-07-15 NOTE — TELEPHONE ENCOUNTER
I called and left a message asking Mrs Hall to call me so that I can introduce the structural heart program and the evaluation process

## 2022-07-15 NOTE — TELEPHONE ENCOUNTER
Mrs Hall returned my call and I introduced the structural heart team I explained the evaluation process and let her know Dr Rivers's office will call her in the next day or two and set up an office visit with him We will then proceed after his recommendation I let her know we will help her navigate this process I provided our contact information and she will call with any further questions

## 2022-07-16 ENCOUNTER — INFUSION (OUTPATIENT)
Dept: ONCOLOGY | Facility: HOSPITAL | Age: 62
End: 2022-07-16

## 2022-07-16 VITALS
RESPIRATION RATE: 18 BRPM | SYSTOLIC BLOOD PRESSURE: 111 MMHG | DIASTOLIC BLOOD PRESSURE: 61 MMHG | OXYGEN SATURATION: 98 % | HEART RATE: 65 BPM | TEMPERATURE: 97.1 F

## 2022-07-16 DIAGNOSIS — D63.8 ANEMIA, CHRONIC DISEASE: ICD-10-CM

## 2022-07-16 PROCEDURE — 86900 BLOOD TYPING SEROLOGIC ABO: CPT

## 2022-07-16 PROCEDURE — 36430 TRANSFUSION BLD/BLD COMPNT: CPT

## 2022-07-16 PROCEDURE — P9016 RBC LEUKOCYTES REDUCED: HCPCS

## 2022-07-16 PROCEDURE — 63710000001 DIPHENHYDRAMINE PER 50 MG: Performed by: INTERNAL MEDICINE

## 2022-07-16 RX ORDER — DIPHENHYDRAMINE HYDROCHLORIDE 50 MG/ML
25 INJECTION INTRAMUSCULAR; INTRAVENOUS ONCE
Status: COMPLETED | OUTPATIENT
Start: 2022-07-16 | End: 2022-07-16

## 2022-07-16 RX ORDER — ACETAMINOPHEN 160 MG/5ML
650 SOLUTION ORAL ONCE
Status: COMPLETED | OUTPATIENT
Start: 2022-07-16 | End: 2022-07-16

## 2022-07-16 RX ORDER — ACETAMINOPHEN 650 MG/1
650 SUPPOSITORY RECTAL ONCE
Status: COMPLETED | OUTPATIENT
Start: 2022-07-16 | End: 2022-07-16

## 2022-07-16 RX ORDER — DIPHENHYDRAMINE HCL 25 MG
25 CAPSULE ORAL ONCE
Status: COMPLETED | OUTPATIENT
Start: 2022-07-16 | End: 2022-07-16

## 2022-07-16 RX ORDER — ACETAMINOPHEN 325 MG/1
650 TABLET ORAL ONCE
Status: COMPLETED | OUTPATIENT
Start: 2022-07-16 | End: 2022-07-16

## 2022-07-16 RX ADMIN — DIPHENHYDRAMINE HYDROCHLORIDE 25 MG: 25 CAPSULE ORAL at 09:24

## 2022-07-16 RX ADMIN — ACETAMINOPHEN 325MG 650 MG: 325 TABLET ORAL at 09:24

## 2022-07-21 ENCOUNTER — HOSPITAL ENCOUNTER (OUTPATIENT)
Dept: PET IMAGING | Facility: HOSPITAL | Age: 62
Discharge: HOME | End: 2022-07-21

## 2022-07-21 DIAGNOSIS — C50.919 METASTATIC BREAST CANCER: ICD-10-CM

## 2022-07-21 LAB
GLUCOSE BLDC GLUCOMTR-MCNC: 79 MG/DL (ref 70–130)
GLUCOSE BLDC GLUCOMTR-MCNC: 83 MG/DL (ref 70–130)
GLUCOSE BLDC GLUCOMTR-MCNC: 99 MG/DL (ref 70–130)

## 2022-07-21 PROCEDURE — A9552 F18 FDG: HCPCS | Performed by: INTERNAL MEDICINE

## 2022-07-21 PROCEDURE — 78815 PET IMAGE W/CT SKULL-THIGH: CPT

## 2022-07-21 PROCEDURE — 82962 GLUCOSE BLOOD TEST: CPT

## 2022-07-21 PROCEDURE — 0 FLUDEOXYGLUCOSE F18 SOLUTION: Performed by: INTERNAL MEDICINE

## 2022-07-21 RX ADMIN — FLUDEOXYGLUCOSE F18 1 DOSE: 300 INJECTION INTRAVENOUS at 08:24

## 2022-07-27 NOTE — PROGRESS NOTES
"Chief Complaint  Metastatic lobular breast cancer (ER/FL positive, HER-2/tj negative), anemia secondary to CKD3, CHF, peripheral neuropathy, chemotherapy related nausea chemotherapy-induced myelosuppression    Subjective        History of Present Illness  The patient returns today in follow-up continuing currently on Faslodex and Ibrance as well as monthly Xgeva.  She last received cycle 9 Faslodex on 7/14/2022.  She is currently receiving Ibrance at a dose of 100 mg daily for 7 days on followed by 7 days off (due to start new week of Ibrance today).  She has had only 1 dose Xgeva on 5/19/2022, subsequent dosing held due to significant hypocalcemia.    Patient returns today with labs and PET scan to review.  She had labs 2 weeks ago with hemoglobin down to 6.4 and received 2 unit PRBC transfusion.  She also had continued hypocalcemia with calcium of 7.7 and hyponatremia with sodium of 128.  Xgeva was held.  She did receive Faslodex.  Patient has been following up with cardiology in regards to her severe aortic stenosis.  She underwent echocardiogram on 7/13/2022 with ejection fraction 56-60%, grade 2 diastolic dysfunction, severe aortic stenosis.  She is awaiting subsequent visit in August to discuss recommendations for intervention in regards to her aortic valve whether that be with open heart surgery or catheter-based approach.  Overall she has had ongoing fatigue and chronic anorexia however symptoms are unchanged.  She continues in a motorized scooter with limited mobility.  She notes development of bright red blood per rectum that occurred for 2 days last week and then resolved.  She did not seek any medical attention at that time.  She notes that overall her bowels have been moving normally.  She denies any musculoskeletal pain.       Objective   Vital Signs:   /66   Pulse 67   Temp 97.1 °F (36.2 °C) (Temporal)   Resp 16   Ht 170.2 cm (67.01\")   Wt (!) 160 kg (352 lb 11.8 oz) Comment: states wt " from 7/25/2022  SpO2 99%   BMI 55.23 kg/m²     Physical Exam  Constitutional:       Appearance: She is well-developed. She is obese.      Comments: Patient is in a motorized scooter today   Eyes:      Conjunctiva/sclera: Conjunctivae normal.   Neck:      Thyroid: No thyromegaly.   Cardiovascular:      Rate and Rhythm: Normal rate and regular rhythm.      Heart sounds: Murmur heard.     No friction rub. No gallop.      Comments: 2/6 murmur  Pulmonary:      Effort: No respiratory distress.      Breath sounds: Normal breath sounds.      Comments: Dialysis access in place in the right subclavian position  Chest:   Breasts:      Right: No supraclavicular adenopathy.      Left: No supraclavicular adenopathy.       Abdominal:      General: Bowel sounds are normal. There is no distension.      Palpations: Abdomen is soft.      Tenderness: There is no abdominal tenderness.   Musculoskeletal:         General: Swelling present.      Comments: 1+ bilateral lower extremity edema with venous stasis changes noted.   Lymphadenopathy:      Head:      Right side of head: No submandibular adenopathy.      Cervical: No cervical adenopathy.      Upper Body:      Right upper body: No supraclavicular adenopathy.      Left upper body: No supraclavicular adenopathy.   Skin:     General: Skin is warm and dry.      Findings: No bruising or rash.   Neurological:      Mental Status: She is alert and oriented to person, place, and time.      Cranial Nerves: No cranial nerve deficit.      Motor: No abnormal muscle tone.      Deep Tendon Reflexes: Reflexes normal.   Psychiatric:         Behavior: Behavior normal.        Patient was examined today, unchanged from above    Result Review : Reviewed CBC, CMP from today.  Reviewed PET scan 7/21/2022.  Reviewed cardiology records including echocardiogram 7/13/2022.       Assessment and Plan    *Metastatic lobular breast cancer (ER/NE positive, HER-2/tj negative):  · Previous stage IIIC right breast  cancer in 2003, received neoadjuvant chemotherapy (unknown regimen) with subsequent bilateral mastectomies 1/22/2004 with right axillary dissection.  Residual 10-12 cm invasive lobular carcinoma on the right breast with 14/16+ nodes with extranodal extension.  Received adjuvant carboplatin and Navelbine chemotherapy x4 cycles  · Attempted adjuvant radiation to the chest wall however interrupted due to cellulitis that required removal of implants in August 2004  · Received tamoxifen from 6/22/2004 until June 2009.  Transitioned to Femara and received until June 2014  · Identification of CT findings concerning for carcinomatosis on CT scans and June 2019.  · PET scan confirmed hypermetabolic activity in the omentum as well as small retroperitoneal lymph nodes.  · CT-guided omental biopsy 7/30/2019 with metastatic lobular carcinoma of breast primary, ER positive (greater than 95%), GA positive (90%), HER-2/tj negative (1+ IHC)  · [facility] liquid biopsy 2/5/2020: MSI undetermined, mutations in BETH, NF1, CHEK2.  No evidence of PIK3CA mutation.  · Subsequent [facility] germline testing 11/12/2021 with BETH VUS (c.2864C>A) and POLE VUS (c.631A>T)  · Initiation of Aromasin and Ibrance in August 2019  · Difficulty with myelosuppression related to Ibrance requiring dose alterations, eventually changed to 100 mg for 7 days on followed by 7 days off.  · CT chest abdomen pelvis 10/26/2021 with increase in left hydronephrosis with increase in left perinephric and periureteral stranding. Decrease in stone in the left kidney lower pole (7 mm).  Stable small retroperitoneal lymph and left periaortic lymph nodes.  · PET scan 11/10/2021 with multiple bilateral hypermetabolic nodular pulmonary lesions, asymmetric moderate to severe left hydronephrosis with ill-defined fat stranding and soft tissue thickening in the renal sinus and surrounding the left ureter, hypermetabolic left retroperitoneal lymph node with slight increase  in size, ill-defined hypermetabolic thickening in the inferior omentum with increase in size, uptake and C7 (indeterminate, recommended MRI).  Short segments of uptake in the mid and distal esophagus possibly related to gastritis.  · PET scan findings felt to be consistent with progressive malignancy.  Patient declined repeat omental biopsy.   · Options for further treatment discussed on 11/12/2021.  In light of renal failure and dialysis, options are more limited.  Recommendation to continue Ibrance and discontinue Aromasin, begin Faslodex.  Discussed possible future use of single agent Taxol.    · Aromasin discontinued 11/12/2021  · On 11/22/2021 initiation of Faslodex with continuation of Ibrance (100 mg daily for 7 days on followed by 7 days off).  · MRI cervical spine 11/18/2021 showed the right C7 normality to likely represent metastatic disease.  With solitary bone lesion, did not recommend pursuit of bone modifying agent.  · Following 2 cycles Faslodex/Ibrance, PET scan on 1/11/2022 showed no change in the soft tissue superior to the dome of the bladder with hypermetabolic activity (likely related to carcinomatosis).  Significant decrease in injection of fat around the left renal pelvis and stable retroperitoneal hypermetabolic lymph nodes, decrease in size and activity in small bilateral pulmonary nodules.  Findings felt to represent evidence of response to treatment.    · Ibrance held briefly beginning 1/28/2022 due to hospitalization with COVID-19 infection and C. difficile colitis.  Patient developed leukopenia/neutropenia.  Ibrance resumed at previous dosing (100 mg daily 7 days on followed by 7 days off) on 2/9/22.  · Following 5 cycles Faslodex/Ibrance PET scan 4/19/2022 with evidence of mixed response.  New hypermetabolic lesion spinous process L2 (SUV 5.1) and slight increase in activity left clavicular metastasis (SUV increased 4.6-8.1).  C7 hypermetabolic mixed lytic and blastic bone lesion was  unchanged.  Decrease in uptake involving omental thickening.  Stable soft tissue thickening around the left renal pelvis and ureter.  Discussion again regarding potential pursuit of IV chemotherapy with Taxol versus continuation of Ibrance and Faslodex with radiation to sites of bony disease at L2, left clavicle, C7.  Patient opted to defer initiation of systemic chemotherapy and continue Ibrance/Faslodex with consideration of radiation.  · Initiated monthly Xgeva on 5/19/2022 (cleared with nephrology).  Xgeva subsequently held due to significant hypocalcemia.  Decision made to forego further Xgeva with persistent hypocalcemia.  · Palliative radiation received to lumbar spine regarding L2 metastasis 5/23- 6/7/2022  · Ibrance held during radiation therapy beginning 5/22/2022.  Ibrance resumed 6/16/2022.  · PET scan 7/21/2022 with stable hypermetabolic abdominal pelvic lymph nodes and subcarinal lymph node, stable bone lesions at C7, left clavicle.  Stable soft tissue thickening around the left renal pelvis with left hydronephrosis.  Uptake in adrenal glands felt to be likely reactive (no change in size).  No activity noted at L2 (previously radiated).  New hypermetabolic lesion right anterior sixth rib.  · With evidence of further slight progression in bone on PET scan (new right sixth rib lesion), on 7/28/2022 discontinued Ibrance and plan to transition to abemaciclib with continuation of Faslodex.  Plan to begin abemaciclib at reduced dose 50 mg twice daily on 8/4/2022.  Consider subsequent dose escalation if tolerated.  · Patient returns today in follow-up continuing on Ibrance 100 mg daily 7 days on followed by 7 days off (due to start a new week of Ibrance today) as well as monthly Faslodex (last administered with cycle 9 on 7/14/2022).  Patient had also initiated monthly Xgeva on 5/19/2022 however subsequent dosing held due to hypocalcemia.  Patient today has PET scan to review from 7/21/2022.  As noted above,  the scan showed fairly stable findings, no evidence of activity at L2 which had been treated previously with radiation therapy.  There was however evidence of new bony metastasis in the anterior portion of the right sixth rib.  Patient is asymptomatic from her bony metastatic disease.  She continues with slow progression.  We have discussed in the past option to transition to IV palliative chemotherapy with weekly Taxol however patient wishes to avoid this if at all possible.  We discussed potential strategy to continue Faslodex and switch from Ibrance to abemaciclib.  This is both related to progression on Ibrance as well as significant myelosuppression from the drug in terms of her anemia.  There is some evidence to suggest potential benefit from switching CDK 4/6 inhibitors.  Patient agrees and we will plan to pursue chemo education and begin treatment next week when patient returns with abemaciclib at a reduced dose initially of 50 mg twice daily.  We will consider in the future possible increase in dose pending her tolerance.  She will have nurse practitioner visit in 1 week when she begins abemaciclib.  In 2 weeks when she would be due for Faslodex, she will be out of town on a trip.  Therefore she will return in 2 weeks when she is due for Faslodex for NP visit.  I will see her in 3 weeks for follow-up.  We did discuss discontinuation of Xgeva due to persistent hypocalcemia ever since she received the first dose on 5/19/2022.    *Anemia secondary to ESRD, exacerbated by Ibrance:  · Anemia secondary to ESRD, malignancy with exacerbation by use of Ibrance  · Previous evidence of folate deficiency 8/12/2019 with level 3.84, initiated folic acid 1 mg daily  · Previous evidence of iron deficiency, received Injectafer on 8/7/2020 750 mg IV x1 dose.  Second dose not administered due to onset of fever, subsequent iron studies precluded further administration of IV iron.  · Previous low normal B12 level initiated oral  B12 1000 mcg daily.  · Patient had previously received Procrit and required intermittent transfusion support  · Following initiation of hemodialysis, management of BEAU transitioned to nephrology, receiving Epogen with dialysis.  · Ongoing transfusion support prompted alteration in Ibrance dosing on 8/23/2021.  · After alteration in Ibrance dosing, hemoglobin improved and remained stable in the 9-10 range.  · Improved but ongoing intermittent need for transfusion support despite Ibrance dose alteration  · Stool negative for occult blood x3 on 11/2/2021  · Patient with reduced dialysis frequency to 2 days/week with concurrent decrease in Epogen dosing corresponding again to increased transfusion requirement.  · Epogen dose increased per nephrology to 20,000 units twice weekly with dialysis on Mondays and Fridays  · Ibrance again exacerbating anemia with ongoing intermittent transfusion requirements  · Additional transfusions required with hemoglobin on 3/31/2022 6.6, hemoglobin 4/21/2022 of 6.4, hemoglobin on 5/5/2022 of 6.8, hemoglobin 6.4 on 7/14/2022, hemoglobin 6.4 on 7/28/2022.  · Patient today with hemoglobin that remains low at 6.4 despite receiving transfusion support due to hemoglobin of 6.4 on 7/14/2022. She is continuing on Epogen 20,000 units Mondays and Fridays with dialysis as well as IV iron on Mondays.  The patient however today notes that she did have bright red blood per rectum for 2 days recently which subsequently resolved.  We will check anemia evaluation with iron panel, ferritin, B12, folate.  Given her aortic stenosis we will also check for hemolysis with haptoglobin and LDH although her total bilirubin and AST are normal.  I have asked her to collect and return stool cards to check for occult blood x3.  She has never undergone endoscopy and I will refer her to GI to consider this possibility although she may be higher risk for procedure due to her aortic stenosis, renal failure.  We are  holding up Ibrance which is a contributing factor to her anemia.  Unclear whether future treatment with abemaciclib will provide less myelosuppression.    *ESRD on HD:  · Patient with previous CKD3/4  · Hospitalization 4/29-5/4/2021 with RYAN/CKD, UTI, anemia.  Required initiation of hemodialysis Tuesday Thursday Saturday.   · Decrease in frequency of dialysis to twice per week.  She continues to produce a significant amount of urine.   · Status post left upper extremity AV fistula placement on 11/3/2021 by vascular surgery  · Attempt to hold further dialysis on 1/11/2022 with close monitoring of her laboratory and clinical parameters.  Dialysis quickly resumed due to development of hyperkalemia.  · Patient continues on dialysis 2 days/week on Mondays and Fridays.  Per patient report potential future consideration to hold dialysis again in the future.  Patient is followed closely by Dr. Antonio.      *CHF with severe aortic stenosis:  · Echocardiogram 7/12/2019 showing ejection fraction 48% with moderate dilation of the LV cavity, global hypokinesis, grade 2 diastolic dysfunction, mild to moderate aortic stenosis, trivial pericardial effusion.  · Echocardiogram 7/19/2021 with ejection fraction 56%, left atrial volume moderately increased, grade 2 diastolic dysfunction, severe calcification aortic valve, moderate aortic stenosis, severe mitral calcification, mild mitral stenosis, marked elevation in RVSP from tricuspid regurgitation.  · Echocardiogram on 7/13/2022 with ejection fraction 56-60%, grade 2 diastolic dysfunction, severe aortic stenosis.    · She is awaiting subsequent visit in August to discuss recommendations for intervention in regards to her aortic valve whether that be with open heart surgery or catheter-based approach.  Open heart surgery would likely be high risk given her metastatic malignancy, renal failure, obesity.    *Left upper and bilateral lower extremity peripheral neuropathy:  · Patient  reports that left upper extremity neuropathy was not present from previous chemotherapy but occurred after hospitalization that required intubation and critical care stay.  Apparently felt to represent critical care neuropathy.  Improved over time.  · Bilateral lower extremity neuropathy felt to be related to diabetes  · May have future implications regarding treatment for breast cancer.  · Patient reports that peripheral neuropathy symptoms continue in the bilateral lower extremities, unchanged.  This will be a consideration with potential future use of Taxol    *Uterine/endometrial activity on PET scan:  · Patient experienced postmenopausal vaginal bleeding in 2013, negative endometrial biopsy and gynecologic evaluation.  · With findings on PET scan with enlarging uterus and some hypermetabolic activity, referred back to Dr. Oliveros in gynecology.    · 9/19/2019 D&C with hysteroscopy by Dr. Oliveros, no significant intraoperative findings, pathologic results with benign findings, no evidence of malignancy.    *Nausea:  · Mild, relieved with Zofran.   · Patient experienced improvement in nausea after initiating hemodialysis  · No recent nausea    *Myelosuppression secondary to Ibrance:  · WBC during hospitalization 1/28-1/30/2022 declined into the 1.9-2.0 range due to concurrent COVID-19 infection.  Ibrance was held briefly.  · Ibrance resumed on 2/9/2022 with WBC up to 4.49, ANC 3.07  · Today, WBC 3.14, ANC 2.09    *Depression  · Patient with history of depression, worsened after the death of her son in November 2021  · With evidence of progressive malignancy in November 2021, patient agreeable to referral to supportive oncology  · Patient was seen in supportive oncology 11/18/2021, Zoloft increased from 50 up to 100 mg daily.  Prescribed armodafinil 50 mg daily however there or issues with insurance coverage  · Patient does continue with difficulties regarding depression that wax and wane    *Left perinephric  stranding secondary to malignancy with hydronephrosis:  · CT 4/22/2021 showed interval enlargement of 2 staghorn calculi in the left kidney with persistent left perinephric stranding  · Hospitalization 4/29-5/4/2021 with RYAN/CKD, UTI, anemia.  Required 2 unit PRBC transfusion and initiated hemodialysis Tuesday Thursday Saturday.    · Discussion from urology regarding potential need for surgical procedure to address staghorn calculus left kidney  · CT scan 7/2/2021 with only 1 remaining left renal stone identified which had decreased in size.  Patient appears to have passed the other stone.  · As above, CT 10/26/2021 with more prominent left hydronephrosis and increase in left perinephric and periureteral stranding.  Felt to possibly be related to passage of recent stone versus partial obstruction secondary to debris or thrombus in the left ureter versus pyelonephritis.  Patient however with no clinical evidence of recent stone passage nor pyelonephritis. Malignancy felt to be the cause of CT changes around the left kidney at this point.   · Left ureteral stent replacement 12/16/2021  · PET scan 1/11/2022 with decrease in fat injection near left renal pelvis, stent in satisfactory position.  Mild residual hydronephrosis on the left.  · Ureteral stent replaced on 3/10/2022  · PET scan 4/19/2022 with no hydronephrosis, left ureteral stent in place  · PET scan 7/21/2022 with persistent left hydronephrosis, ureteral stent in place    *Right thyroid nodules  · Patient underwent prior thyroid biopsy by her report with benign findings many years ago, records not available  · On MRI cervical spine 11/18/2021, finding of 1.8 cm right thyroid nodule  · Thyroid ultrasound 11/26/2021 with right-sided thyroid nodules, 1 nodule was hypoechoic, solid measuring 2 cm with biopsy recommended.  · Thyroid ultrasound results reviewed with patient.  In light of her metastatic breast cancer, renal failure the significance of the thyroid  nodule is felt to be much less.  We discussed possibility of thyroid biopsy however patient is inclined to forego this, especially since she has had a biopsy performed in the past with benign findings by her report.    · No hypermetabolic activity identified on PET scan 1/11/2022 in the neck    *Hospitalization 1/28-1/30/2022 due to COVID-19 infection and C. difficile colitis  · Patient fully vaccinated with 3 doses Moderna COVID-19 vaccine  · Patient developed symptoms 1/26/2022 with abrupt onset sinus congestion, mild cough, subsequently developed nausea and diarrhea on 1/27/2022.  Significant fatigue.  · Patient tested positive in emergency department on 1/28/2022 and was admitted  · Patient maintained O2 saturation in mid 90% range on room air  · Patient received remdesivir  · Patient was also found to be positive for C. difficile colitis, received course of oral vancomycin.  · Symptoms from both COVID-19 and C. difficile colitis ultimately resolved.    *Hypocalcemia  · Patient developed significant hypocalcemia after receiving Xgeva on 5/19/2022  · Calcium management per nephrology  · No further Xgeva planned due to persistent significant hypocalcemia, calcium 7.9 today.    Plan:  1. Patient is continuing on Faslodex 500 mg IM injection every 28 days, received cycle 9 on 7/14/2022.  2. Discontinue Ibrance due to progressive disease on recent PET scan and ongoing myelosuppression with severe anemia  3. We will not plan to resume Xgeva due to continued difficulty with hypocalcemia following initial dosing.    4. Education for abemaciclib with pharmacy staff  5. Transfuse 2 unit PRBC  6. We will add labs today with iron panel, ferritin, B12, folate, LDH, haptoglobin  7. Patient will collect and return stool cards for occult blood x3  8. Referral to GI to consider pursuit of EGD and colonoscopy  9. Continue oral folic acid 1 mg daily, B12 1000 mcg daily.  10. The patient is continuing on hemodialysis every Monday  and Friday and is receiving Epogen 20,000 units with each dialysis per nephrology.  Patient is also receiving IV iron every Monday with dialysis.  11. Patient is following up on 8/18/2022 with cardiology to discuss options for management of her aortic stenosis.  12. In 1 week NP visit with CBC, CMP.  We will plan to begin abemaciclib at that time at reduced dose of 50 mg twice daily initially.  13. Patient will be on vacation in 2 weeks and will not be able to return for Faslodex  14. In 3 weeks NP visit with CBC, CMP and Faslodex.  She will be continuing on abemaciclib at 50 mg twice daily  15. MD visit in 4 weeks with CBC CMP.  We will assess tolerance of abemaciclib and consider whether dose increase could be performed.    Patient continues on high risk medication requiring intensive monitoring.    I did spend 70 minutes in total time caring for patient today, time spent in review of records, face-to-face consultation, coordination of care, placement of orders, completion of documentation.

## 2022-07-28 ENCOUNTER — OFFICE VISIT (OUTPATIENT)
Dept: ONCOLOGY | Facility: CLINIC | Age: 62
End: 2022-07-28

## 2022-07-28 ENCOUNTER — LAB (OUTPATIENT)
Dept: LAB | Facility: HOSPITAL | Age: 62
End: 2022-07-28

## 2022-07-28 VITALS
HEART RATE: 67 BPM | DIASTOLIC BLOOD PRESSURE: 66 MMHG | RESPIRATION RATE: 16 BRPM | TEMPERATURE: 97.1 F | BODY MASS INDEX: 45.99 KG/M2 | WEIGHT: 293 LBS | SYSTOLIC BLOOD PRESSURE: 107 MMHG | HEIGHT: 67 IN | OXYGEN SATURATION: 99 %

## 2022-07-28 DIAGNOSIS — D63.1 ANEMIA DUE TO CHRONIC KIDNEY DISEASE, ON CHRONIC DIALYSIS: Primary | ICD-10-CM

## 2022-07-28 DIAGNOSIS — C50.919 METASTATIC BREAST CANCER: ICD-10-CM

## 2022-07-28 DIAGNOSIS — Z99.2 ANEMIA DUE TO CHRONIC KIDNEY DISEASE, ON CHRONIC DIALYSIS: Primary | ICD-10-CM

## 2022-07-28 DIAGNOSIS — N18.6 ANEMIA DUE TO CHRONIC KIDNEY DISEASE, ON CHRONIC DIALYSIS: Primary | ICD-10-CM

## 2022-07-28 LAB
ABO GROUP BLD: NORMAL
ALBUMIN SERPL-MCNC: 3.2 G/DL (ref 3.5–5.2)
ALBUMIN/GLOB SERPL: 0.9 G/DL (ref 1.1–2.4)
ALP SERPL-CCNC: 71 U/L (ref 38–116)
ALT SERPL W P-5'-P-CCNC: 9 U/L (ref 0–33)
ANION GAP SERPL CALCULATED.3IONS-SCNC: 11.2 MMOL/L (ref 5–15)
ANTI-E: NORMAL
AST SERPL-CCNC: 13 U/L (ref 0–32)
BASOPHILS # BLD AUTO: 0.05 10*3/MM3 (ref 0–0.2)
BASOPHILS NFR BLD AUTO: 1.6 % (ref 0–1.5)
BILIRUB SERPL-MCNC: 0.3 MG/DL (ref 0.2–1.2)
BLD GP AB SCN SERPL QL: POSITIVE
BUN SERPL-MCNC: 54 MG/DL (ref 6–20)
BUN/CREAT SERPL: 11.3 (ref 7.3–30)
CALCIUM SPEC-SCNC: 7.9 MG/DL (ref 8.5–10.2)
CHLORIDE SERPL-SCNC: 104 MMOL/L (ref 98–107)
CO2 SERPL-SCNC: 22.8 MMOL/L (ref 22–29)
CREAT SERPL-MCNC: 4.77 MG/DL (ref 0.6–1.1)
DEPRECATED RDW RBC AUTO: 69.6 FL (ref 37–54)
EGFRCR SERPLBLD CKD-EPI 2021: 9.7 ML/MIN/1.73
EOSINOPHIL # BLD AUTO: 0.13 10*3/MM3 (ref 0–0.4)
EOSINOPHIL NFR BLD AUTO: 4.1 % (ref 0.3–6.2)
ERYTHROCYTE [DISTWIDTH] IN BLOOD BY AUTOMATED COUNT: 17.9 % (ref 12.3–15.4)
FERRITIN SERPL-MCNC: 412.4 NG/ML (ref 13–150)
FOLATE SERPL-MCNC: 19.9 NG/ML (ref 4.78–24.2)
GLOBULIN UR ELPH-MCNC: 3.7 GM/DL (ref 1.8–3.5)
GLUCOSE SERPL-MCNC: 106 MG/DL (ref 74–124)
HAPTOGLOB SERPL-MCNC: 300 MG/DL (ref 30–200)
HCT VFR BLD AUTO: 21.3 % (ref 34–46.6)
HGB BLD-MCNC: 6.4 G/DL (ref 12–15.9)
IMM GRANULOCYTES # BLD AUTO: 0.03 10*3/MM3 (ref 0–0.05)
IMM GRANULOCYTES NFR BLD AUTO: 1 % (ref 0–0.5)
IRON 24H UR-MRATE: 32 MCG/DL (ref 37–145)
IRON SATN MFR SERPL: 15 % (ref 14–48)
LDH SERPL-CCNC: 199 U/L (ref 99–259)
LYMPHOCYTES # BLD AUTO: 0.42 10*3/MM3 (ref 0.7–3.1)
LYMPHOCYTES NFR BLD AUTO: 13.4 % (ref 19.6–45.3)
MCH RBC QN AUTO: 31.8 PG (ref 26.6–33)
MCHC RBC AUTO-ENTMCNC: 30 G/DL (ref 31.5–35.7)
MCV RBC AUTO: 106 FL (ref 79–97)
MONOCYTES # BLD AUTO: 0.42 10*3/MM3 (ref 0.1–0.9)
MONOCYTES NFR BLD AUTO: 13.4 % (ref 5–12)
NEUTROPHILS NFR BLD AUTO: 2.09 10*3/MM3 (ref 1.7–7)
NEUTROPHILS NFR BLD AUTO: 66.5 % (ref 42.7–76)
NONSPECIFIC GEL REACTION: NORMAL
NRBC BLD AUTO-RTO: 0 /100 WBC (ref 0–0.2)
PLATELET # BLD AUTO: 206 10*3/MM3 (ref 140–450)
PMV BLD AUTO: 8.6 FL (ref 6–12)
POTASSIUM SERPL-SCNC: 5.1 MMOL/L (ref 3.5–4.7)
PROT SERPL-MCNC: 6.9 G/DL (ref 6.3–8)
RBC # BLD AUTO: 2.01 10*6/MM3 (ref 3.77–5.28)
RH BLD: POSITIVE
SODIUM SERPL-SCNC: 138 MMOL/L (ref 134–145)
T&S EXPIRATION DATE: NORMAL
TIBC SERPL-MCNC: 210 MCG/DL (ref 249–505)
TRANSFERRIN SERPL-MCNC: 150 MG/DL (ref 200–360)
VIT B12 BLD-MCNC: 925 PG/ML (ref 211–946)
WBC NRBC COR # BLD: 3.14 10*3/MM3 (ref 3.4–10.8)

## 2022-07-28 PROCEDURE — 83010 ASSAY OF HAPTOGLOBIN QUANT: CPT | Performed by: INTERNAL MEDICINE

## 2022-07-28 PROCEDURE — 83540 ASSAY OF IRON: CPT | Performed by: INTERNAL MEDICINE

## 2022-07-28 PROCEDURE — 86902 BLOOD TYPE ANTIGEN DONOR EA: CPT

## 2022-07-28 PROCEDURE — 84466 ASSAY OF TRANSFERRIN: CPT | Performed by: INTERNAL MEDICINE

## 2022-07-28 PROCEDURE — 80053 COMPREHEN METABOLIC PANEL: CPT

## 2022-07-28 PROCEDURE — 86920 COMPATIBILITY TEST SPIN: CPT

## 2022-07-28 PROCEDURE — 82746 ASSAY OF FOLIC ACID SERUM: CPT | Performed by: INTERNAL MEDICINE

## 2022-07-28 PROCEDURE — 36415 COLL VENOUS BLD VENIPUNCTURE: CPT

## 2022-07-28 PROCEDURE — 82607 VITAMIN B-12: CPT | Performed by: INTERNAL MEDICINE

## 2022-07-28 PROCEDURE — 86850 RBC ANTIBODY SCREEN: CPT | Performed by: INTERNAL MEDICINE

## 2022-07-28 PROCEDURE — 86900 BLOOD TYPING SEROLOGIC ABO: CPT | Performed by: INTERNAL MEDICINE

## 2022-07-28 PROCEDURE — 99417 PROLNG OP E/M EACH 15 MIN: CPT | Performed by: INTERNAL MEDICINE

## 2022-07-28 PROCEDURE — 82728 ASSAY OF FERRITIN: CPT | Performed by: INTERNAL MEDICINE

## 2022-07-28 PROCEDURE — 83615 LACTATE (LD) (LDH) ENZYME: CPT | Performed by: INTERNAL MEDICINE

## 2022-07-28 PROCEDURE — 86870 RBC ANTIBODY IDENTIFICATION: CPT | Performed by: INTERNAL MEDICINE

## 2022-07-28 PROCEDURE — 85025 COMPLETE CBC W/AUTO DIFF WBC: CPT

## 2022-07-28 PROCEDURE — 86901 BLOOD TYPING SEROLOGIC RH(D): CPT | Performed by: INTERNAL MEDICINE

## 2022-07-28 PROCEDURE — 99215 OFFICE O/P EST HI 40 MIN: CPT | Performed by: INTERNAL MEDICINE

## 2022-07-28 PROCEDURE — 86922 COMPATIBILITY TEST ANTIGLOB: CPT

## 2022-07-28 RX ORDER — FUROSEMIDE 10 MG/ML
40 INJECTION INTRAMUSCULAR; INTRAVENOUS ONCE
Status: CANCELLED | OUTPATIENT
Start: 2022-07-28 | End: 2022-07-28

## 2022-07-28 RX ORDER — SODIUM CHLORIDE 9 MG/ML
250 INJECTION, SOLUTION INTRAVENOUS AS NEEDED
Status: CANCELLED | OUTPATIENT
Start: 2022-07-30

## 2022-07-29 ENCOUNTER — SPECIALTY PHARMACY (OUTPATIENT)
Dept: PHARMACY | Facility: HOSPITAL | Age: 62
End: 2022-07-29

## 2022-07-29 ENCOUNTER — HOSPITAL ENCOUNTER (OUTPATIENT)
Dept: INFUSION THERAPY | Facility: HOSPITAL | Age: 62
Discharge: HOME | End: 2022-07-29

## 2022-07-29 DIAGNOSIS — Z99.2 ANEMIA DUE TO CHRONIC KIDNEY DISEASE, ON CHRONIC DIALYSIS: ICD-10-CM

## 2022-07-29 DIAGNOSIS — D63.1 ANEMIA DUE TO CHRONIC KIDNEY DISEASE, ON CHRONIC DIALYSIS: ICD-10-CM

## 2022-07-29 DIAGNOSIS — C50.919 METASTATIC BREAST CANCER: Primary | ICD-10-CM

## 2022-07-29 DIAGNOSIS — N18.6 ANEMIA DUE TO CHRONIC KIDNEY DISEASE, ON CHRONIC DIALYSIS: ICD-10-CM

## 2022-07-30 ENCOUNTER — INFUSION (OUTPATIENT)
Dept: ONCOLOGY | Facility: HOSPITAL | Age: 62
End: 2022-07-30

## 2022-07-30 VITALS
TEMPERATURE: 98.1 F | DIASTOLIC BLOOD PRESSURE: 69 MMHG | SYSTOLIC BLOOD PRESSURE: 112 MMHG | HEART RATE: 61 BPM | OXYGEN SATURATION: 98 % | RESPIRATION RATE: 18 BRPM

## 2022-07-30 DIAGNOSIS — N18.6 ANEMIA DUE TO CHRONIC KIDNEY DISEASE, ON CHRONIC DIALYSIS: ICD-10-CM

## 2022-07-30 DIAGNOSIS — D63.1 ANEMIA DUE TO CHRONIC KIDNEY DISEASE, ON CHRONIC DIALYSIS: ICD-10-CM

## 2022-07-30 DIAGNOSIS — Z99.2 ANEMIA DUE TO CHRONIC KIDNEY DISEASE, ON CHRONIC DIALYSIS: ICD-10-CM

## 2022-07-30 PROCEDURE — 36430 TRANSFUSION BLD/BLD COMPNT: CPT

## 2022-07-30 PROCEDURE — 86900 BLOOD TYPING SEROLOGIC ABO: CPT

## 2022-07-30 PROCEDURE — P9016 RBC LEUKOCYTES REDUCED: HCPCS

## 2022-07-30 RX ORDER — FUROSEMIDE 10 MG/ML
40 INJECTION INTRAMUSCULAR; INTRAVENOUS ONCE
Status: DISCONTINUED | OUTPATIENT
Start: 2022-07-30 | End: 2022-07-30 | Stop reason: HOSPADM

## 2022-07-30 RX ORDER — SODIUM CHLORIDE 9 MG/ML
250 INJECTION, SOLUTION INTRAVENOUS AS NEEDED
Status: DISCONTINUED | OUTPATIENT
Start: 2022-07-30 | End: 2022-07-30 | Stop reason: HOSPADM

## 2022-08-01 ENCOUNTER — APPOINTMENT (OUTPATIENT)
Dept: LAB | Facility: HOSPITAL | Age: 62
End: 2022-08-01

## 2022-08-01 ENCOUNTER — SPECIALTY PHARMACY (OUTPATIENT)
Dept: ONCOLOGY | Facility: HOSPITAL | Age: 62
End: 2022-08-01

## 2022-08-01 ENCOUNTER — OFFICE VISIT (OUTPATIENT)
Dept: ONCOLOGY | Facility: CLINIC | Age: 62
End: 2022-08-01

## 2022-08-01 ENCOUNTER — SPECIALTY PHARMACY (OUTPATIENT)
Dept: PHARMACY | Facility: HOSPITAL | Age: 62
End: 2022-08-01

## 2022-08-01 VITALS
HEIGHT: 67 IN | WEIGHT: 293 LBS | RESPIRATION RATE: 16 BRPM | DIASTOLIC BLOOD PRESSURE: 75 MMHG | TEMPERATURE: 97.1 F | SYSTOLIC BLOOD PRESSURE: 155 MMHG | OXYGEN SATURATION: 96 % | HEART RATE: 65 BPM | BODY MASS INDEX: 45.99 KG/M2

## 2022-08-01 DIAGNOSIS — C50.919 METASTATIC BREAST CANCER: Primary | ICD-10-CM

## 2022-08-01 LAB
COLLECT DATE SP2 STL: ABNORMAL
COLLECT DATE SP3 STL: ABNORMAL
COLLECT DATE STL: ABNORMAL
GASTROCULT GAST QL: POSITIVE
HEMOCCULT SP2 STL QL: POSITIVE
HEMOCCULT SP3 STL QL: POSITIVE
Lab: 12

## 2022-08-01 PROCEDURE — 99213 OFFICE O/P EST LOW 20 MIN: CPT | Performed by: NURSE PRACTITIONER

## 2022-08-01 PROCEDURE — 82272 OCCULT BLD FECES 1-3 TESTS: CPT | Performed by: INTERNAL MEDICINE

## 2022-08-01 NOTE — PROGRESS NOTES
TREATMENT  PREPARATION    Juana Hall  [id number]  1960    Chief Complaint: Treatment preparation and needs assessment    History of present illness:  Juana Hall is a 61 y.o. year old female who is here today for treatment preparation and needs assessment.  The patient has been diagnosed with   Encounter Diagnosis   Name Primary?   • Metastatic breast cancer (HCC) Yes    and is scheduled to begin treatment with:     Oncology History:    Oncology/Hematology History   Metastatic breast cancer (HCC)   8/12/2019 Initial Diagnosis    Metastatic breast cancer (CMS/HCC)     6/17/2020 - 6/17/2020 Chemotherapy    OP BREAST Exemestane / Palbociclib     11/22/2021 - 7/14/2022 Chemotherapy    OP BREAST Fulvestrant / Palbociclib     5/19/2022 -  Chemotherapy    OP SUPPORTIVE Denosumab (Xgeva) Q28D     8/18/2022 -  Chemotherapy    OP BREAST Abemaciclib / Fulvestrant         The current medication list and allergy list were reviewed and reconciled.     Past Medical History, Past Surgical History, Social History, Family History have been reviewed and are without significant changes except as mentioned.    Physical Exam:    Vitals:    08/01/22 0915   BP: 155/75   Pulse: 65   Resp: 16   Temp: 97.1 °F (36.2 °C)   SpO2: 96%     Vitals:    08/01/22 0915   PainSc: 0-No pain        ECOG score: 1             Physical Exam  HENT:      Head: Normocephalic and atraumatic.   Eyes:      Extraocular Movements: Extraocular movements intact.      Conjunctiva/sclera: Conjunctivae normal.   Pulmonary:      Effort: Pulmonary effort is normal. No respiratory distress.   Musculoskeletal:      Comments: Limites, seated on scooter   Neurological:      General: No focal deficit present.      Mental Status: She is alert and oriented to person, place, and time.   Psychiatric:         Mood and Affect: Mood normal.         Behavior: Behavior normal.           NEEDS ASSESSMENTS    Genetics  The patient's new diagnosis and family history have been  reviewed for genetic counseling needs. A genetic referral is not recommended.     Psychosocial and Barriers to care  The patient has completed a PHQ-9 Depression Screening and the Distress Thermometer (DT) today.  PHQ-9 results show PHQ-2 Total Score: 0 PHQ-9 Total Score: PHQ-9 Total Score: 0     The patient scored their distress today as Distress Level: 0-No distress on a scale of 0-10 with 0 being no distress and 10 being extreme distress. Problems marked by the patient as being an issue for them within the last week include   .      Results were reviewed along with psychosocial resources offered by our cancer center.  Our Supportive Oncology team will be flagged for a score of 4 or above, and a same day call will be made for a score of 9 or 10.  A mental health referral is offered at that time. Patients who score less than 4 have been educated on our support services and can be referred to our  upon request.  The patient will not be referred to our .       Nutrition  The patient has completed the malnutrition screening today. They scored Malnutrition Screening Tool  Have you recently lost weight without trying?  If yes, how much weight have you lost?: 0--> No  Have you been eating poorly because of a decreased appetite?: 0--> No  MST score: 0   with a score of 0-1 meaning not at risk in a score of 2 or greater meaning at risk.  Patients with a score of 3 or higher will be referred to our oncology dietitian for support. Patients beginning at risk treatment regimens or who have dietary concerns will also be referred to our oncology dietitian. The patient will not be referred.    Functional Assessment  Persons who are age 70 or greater will be screened for qualification of a comprehensive geriatric assessment by our survivorship nurse practitioner.  Older adults with cancer face unique challenges. These may include an increased risk of drug reactions, financial burdens, and caregiver  "stress. The patient scored   . Patients scoring 14 or lower will referred for an older adult functional assessment with the survivorship advanced practice registered nurse to ensure all needed support is provided as patients plan for their treatments. NOT APPLICABLE    Intravenous Access Assessment  The patient and I discussed planned intravenous chemo/biotherapy as well as other IV treatments that are often needed throughout the course of treatment. These may include, but are not limited to blood transfusions, antibiotics, and IV hydration. Discussed that depending on selected treatment and vein assessment, patient may require venous access device (VAD) which could include but not limited to a Mediport or PICC line. Risks and benefits of VADs reviewed. The patient will be treated with an oral medication.    Reproductive/Sexual Activity   People should avoid becoming pregnant and should not get a partner pregnant while undergoing chemo/biotherapy.  People of childbearing age should use effective contraception during active therapy. The best recommendation for all people is to use a barrier method for a minimum of 1 week after the last infusion of chemo/biotherapy to prevent your partner being exposed to byproducts from treatment medications in bodily fluids. Effective contraception should be discussed with your oncology team to make sure it is safe to take based on your diagnosis. Possible options include oral contraceptives, barrier methods. Chemo/biotherapy can change your ability to reproduce children in the future.  There are options for fertility preservation. NOT APPLICABLE    Advanced Care Planning  Advance Care Planning   The patient and I discussed advanced care planning, \"Conversations that Matter\".   This service is offered for development of advance directives with a certified ACP facilitator.  The patient does have an up-to-date advanced directive. This document is not on file with our office. The " patient is not interested in an appointment with one of our facilitators to create or update their advanced directives.     Smoking cessation  Tobacco Use: Low Risk    • Smoking Tobacco Use: Never Smoker   • Smokeless Tobacco Use: Never Used       Patient and I discussed their tobacco use history. Referral will not be made for smoking cessation.      Palliative Care  When appropriate, the patient and I discussed the availability palliative care services and when appropriate Hospice care. Palliative care is not the same as Hospice care which was explained to the patient.  The patient is not interested in additional information from our  on these services.     Survivorship   When appropriate, we discussed that we will refer the patient to survivorship clinic to discuss next steps following completion of planned treatment.  Reviewed this visit will include assessment of your physical, psychological, functional, and spiritual needs as a survivor and the need at attend this visit when scheduled.    TREATMENT EDUCATION    Today I met with the patient to discuss the chemo/biotherapy regimen recommended for treatment of Metastatic breast cancer (HCC)  .  The patient was given explanation of treatment premed side effects including office policy that prohibits patients to drive if sedating medications are administered, MD explanation given regarding benefits, side effects, toxicities and goals of treatment.  The patient received a Chemotherapy/Biotherapy Plan Summary including diagnosis and explanation of specific treatment plan.    Discussion also included side effects specific to drugs in the treatment plan by our MTM pharmacist, specifically:    Treatment Plans     Name Type Hold Status Plan Dates Plan Provider       Active    OP SUPPORTIVE Epoeitin Dread / Epoetin dread-epbx ONCOLOGY SUPPORTIVE CARE 1 On Manual Hold  9/4/2020 - Present Leodan Irvin Jr., MD    OP SUPPORTIVE Denosumab (Xgeva) Q28D ONCOLOGY  SUPPORTIVE CARE 2 Not on Hold  5/19/2022 - Present Leodan Irvin Jr., MD    OP BREAST Abemaciclib / Fulvestrant ONCOLOGY TREATMENT Not on Hold  7/31/2022 - Present Leodan Irvin Jr., MD                   Questions answered.      Assessment and Plan:    Diagnoses and all orders for this visit:    1. Metastatic breast cancer (HCC) (Primary)      No orders of the defined types were placed in this encounter.        1. The patient and I have reviewed their diagnosis and scheduled treatment plan. Needs assessment was completed where applicable including genetics, psychosocial needs, barriers to care, VAD evaluation, advanced care planning, survivorship, and palliative care services where indicated. Referrals have been ordered as appropriate based upon evaluation today and patient desires.   2. Chemo/biotherapy teaching was completed today and consent obtained. See separate documentation for further details.  3. Adequate time was given to answer questions.  Patient made aware of their care team members and contact information if they have questions or problems throughout the treatment course.  4. Discussion held and written information provided describing frequency of office visits and ongoing monitoring throughout the treatment plan.     5. Reviewed with patient any prescribed medication sent to pharmacy.  Education provided regarding proper storage, safe handling, and proper disposal of unused medication.  6. Proper handling of body fluids and waste discussed and written information provided.  7. If appropriate, patient had pretreatment labs drawn today.    Learning assessment completed at initial patient encounter. See separate flowsheet. Chemo/biotherapy education comprehension assessed at today's visit.    I spent 20 minutes caring for Juana on this date of service. This time includes time spent by me in the following activities: preparing for the visit, performing a medically appropriate examination and/or evaluation  and counseling and educating the patient/family/caregiver.     Deepika Monzon, BETY   08/01/22

## 2022-08-01 NOTE — PROGRESS NOTES
[facility] Hematology/Oncology Treatment Plan Summary    [facility]ana Hall  [id number]  MD: Dr. Irvin    Diagnosis: Breast cancer Stage: IV    Goal of chemotherapy: palliative and disease control    Treatment Medication(s) / Frequency and Dosing    1. Verzenio (abemaciclib) 50 mg po bid   2. Continue Faslodex 500 mg IM monthly    Number of cycles: until disease progression or unacceptable toxicity    Starting on: Receipt (8/2/2022)     Follow-up Testing to be determined after TBD cycles by MD.     Items for home use: Imodium AD (for diarrhea)    Rx written for: [] Nausea    [] Pre-Chemo   ondansetron 8 mg by mouth every 8 hours as needed for nausea  Patient has zofran on hand    Completing Pharmacist: Myah Argueta RPH             Date/time: 08/01/2022 09:23 EDT    Please note: You will be seen by a provider frequently with your treatment plan. This plan may change depending on many factors, if so, this will be discussed with you by your physician.  Last update 12/2020.       Counseling Provided:  - Side effects reviewed with patient including: decreased WBC/increased risk for infection , decreased platelets/increased risk for bleed, decreased hemoglobin, fatigue, nausea/vomiting, diarrhea, changes in kidney function, changes in liver function and blood clots and ILD. Additional side effects covered in written handout.   - Reviewed proper administration: Discussed if the patient is handling their own medications, then they need to wash their hands properly after touching the medication. If a caregiver is assisting with handling medication, need to wear disposal gloves and wash hands properly afterwards. Patient was counseled to take Verzenio with or without food, at the same time each day. Additional precautions: avoid grapefruit and grapefruit juice  - Provided information on prior storage of medication: Store medication safe away from children and pets, away from light, and at room temperature.  If you use a pill box, use a separate pill box from other medication.   - Provided information on safe handling of soiled linen and proper flush technique and reviewed proper disposal of medication.   - Reviewed what to do in the event of a missed dose: Do not take an extra dose or two doses at one time. Simply take your next dose at the regularly scheduled time.  - Reviewed expected goals/outcomes, contraindications and safety precautions, including when to seek medical care.  - Provided information on pregnancy considerations - women should not become pregnant and men should not get a partner pregnant while taking; men and women of childbearing age and potential should use effective contraception during and after therapy.    Provided patient with:   Education sheets about the medication, 24-hour clinic phone number and my contact information and instructions to call should additional questions arise.     Completed medication reconciliation today to assess for drug interactions.   Reviewed concomitant medications, allergies, labs, comorbidities/medical history, and immunization history.   Drug-drug interactions noted: no significant drug interactions noted.   Advised pt to call the clinic if any new medications are started so we can assess for drug-drug interactions     Wrap-up:  Discussed aforementioned material with patient in person, face-to-face, in clinic.   Chemo consents/CCA were signed at today's visit.   Medication availability: Patient will receive Verzenio from [facility] on 8/2/2022  Patient expressed understanding.   Patient demonstrates ability to self-administer medication. No barriers to adherence identified.  All questions and concerns addressed.     Myah Argueta RPH  8/1/2022  09:23 EDT

## 2022-08-01 NOTE — PROGRESS NOTES
[facility].

## 2022-08-02 ENCOUNTER — SPECIALTY PHARMACY (OUTPATIENT)
Dept: PHARMACY | Facility: HOSPITAL | Age: 62
End: 2022-08-02

## 2022-08-02 ENCOUNTER — TELEPHONE (OUTPATIENT)
Dept: ONCOLOGY | Facility: CLINIC | Age: 62
End: 2022-08-02

## 2022-08-02 NOTE — TELEPHONE ENCOUNTER
Phoned patient to inform her that her stool cards were positive for occult blood. Patient v/u. Patient has follow up appointment with labs and NP visit this week to check hemoglobin. Patient stated she will be out of town next week and unable to come in for labs, however she does have an appointment scheduled with GI on 8/16. Encouraged patient to call if she has any questions or concerns in the meantime.

## 2022-08-03 DIAGNOSIS — C50.919 METASTATIC BREAST CANCER: Primary | ICD-10-CM

## 2022-08-04 ENCOUNTER — OFFICE VISIT (OUTPATIENT)
Dept: ONCOLOGY | Facility: CLINIC | Age: 62
End: 2022-08-04

## 2022-08-04 ENCOUNTER — OFFICE VISIT (OUTPATIENT)
Dept: PSYCHIATRY | Facility: HOSPITAL | Age: 62
End: 2022-08-04

## 2022-08-04 ENCOUNTER — LAB (OUTPATIENT)
Dept: LAB | Facility: HOSPITAL | Age: 62
End: 2022-08-04

## 2022-08-04 VITALS
RESPIRATION RATE: 16 BRPM | TEMPERATURE: 98.4 F | HEIGHT: 67 IN | BODY MASS INDEX: 45.99 KG/M2 | OXYGEN SATURATION: 100 % | DIASTOLIC BLOOD PRESSURE: 65 MMHG | HEART RATE: 73 BPM | WEIGHT: 293 LBS | SYSTOLIC BLOOD PRESSURE: 106 MMHG

## 2022-08-04 DIAGNOSIS — F41.9 ANXIETY AND DEPRESSION: ICD-10-CM

## 2022-08-04 DIAGNOSIS — R53.83 FATIGUE, UNSPECIFIED TYPE: ICD-10-CM

## 2022-08-04 DIAGNOSIS — C50.919 METASTATIC BREAST CANCER: Primary | ICD-10-CM

## 2022-08-04 DIAGNOSIS — N18.32 ANEMIA IN STAGE 3B CHRONIC KIDNEY DISEASE: ICD-10-CM

## 2022-08-04 DIAGNOSIS — C50.919 METASTATIC BREAST CANCER: ICD-10-CM

## 2022-08-04 DIAGNOSIS — F32.A ANXIETY AND DEPRESSION: ICD-10-CM

## 2022-08-04 DIAGNOSIS — G47.33 OSA (OBSTRUCTIVE SLEEP APNEA): ICD-10-CM

## 2022-08-04 DIAGNOSIS — D63.1 ANEMIA IN STAGE 3B CHRONIC KIDNEY DISEASE: ICD-10-CM

## 2022-08-04 DIAGNOSIS — Z79.899 HIGH RISK MEDICATION USE: ICD-10-CM

## 2022-08-04 LAB
ALBUMIN SERPL-MCNC: 3.3 G/DL (ref 3.5–5.2)
ALBUMIN/GLOB SERPL: 0.9 G/DL (ref 1.1–2.4)
ALP SERPL-CCNC: 92 U/L (ref 38–116)
ALT SERPL W P-5'-P-CCNC: 25 U/L (ref 0–33)
ANION GAP SERPL CALCULATED.3IONS-SCNC: 13.3 MMOL/L (ref 5–15)
AST SERPL-CCNC: 34 U/L (ref 0–32)
BASOPHILS # BLD AUTO: 0.04 10*3/MM3 (ref 0–0.2)
BASOPHILS NFR BLD AUTO: 0.8 % (ref 0–1.5)
BILIRUB SERPL-MCNC: 0.3 MG/DL (ref 0.2–1.2)
BUN SERPL-MCNC: 48 MG/DL (ref 6–20)
BUN/CREAT SERPL: 10.9 (ref 7.3–30)
CALCIUM SPEC-SCNC: 7.5 MG/DL (ref 8.5–10.2)
CHLORIDE SERPL-SCNC: 105 MMOL/L (ref 98–107)
CO2 SERPL-SCNC: 22.7 MMOL/L (ref 22–29)
CREAT SERPL-MCNC: 4.42 MG/DL (ref 0.6–1.1)
DEPRECATED RDW RBC AUTO: 63.3 FL (ref 37–54)
EGFRCR SERPLBLD CKD-EPI 2021: 10.7 ML/MIN/1.73
EOSINOPHIL # BLD AUTO: 0.24 10*3/MM3 (ref 0–0.4)
EOSINOPHIL NFR BLD AUTO: 4.9 % (ref 0.3–6.2)
ERYTHROCYTE [DISTWIDTH] IN BLOOD BY AUTOMATED COUNT: 16.8 % (ref 12.3–15.4)
GLOBULIN UR ELPH-MCNC: 3.8 GM/DL (ref 1.8–3.5)
GLUCOSE SERPL-MCNC: 113 MG/DL (ref 74–124)
HCT VFR BLD AUTO: 27.7 % (ref 34–46.6)
HGB BLD-MCNC: 8.1 G/DL (ref 12–15.9)
IMM GRANULOCYTES # BLD AUTO: 0.1 10*3/MM3 (ref 0–0.05)
IMM GRANULOCYTES NFR BLD AUTO: 2 % (ref 0–0.5)
LYMPHOCYTES # BLD AUTO: 0.52 10*3/MM3 (ref 0.7–3.1)
LYMPHOCYTES NFR BLD AUTO: 10.6 % (ref 19.6–45.3)
MCH RBC QN AUTO: 30.7 PG (ref 26.6–33)
MCHC RBC AUTO-ENTMCNC: 29.2 G/DL (ref 31.5–35.7)
MCV RBC AUTO: 104.9 FL (ref 79–97)
MONOCYTES # BLD AUTO: 0.46 10*3/MM3 (ref 0.1–0.9)
MONOCYTES NFR BLD AUTO: 9.3 % (ref 5–12)
NEUTROPHILS NFR BLD AUTO: 3.56 10*3/MM3 (ref 1.7–7)
NEUTROPHILS NFR BLD AUTO: 72.4 % (ref 42.7–76)
NRBC BLD AUTO-RTO: 0 /100 WBC (ref 0–0.2)
PLATELET # BLD AUTO: 269 10*3/MM3 (ref 140–450)
PMV BLD AUTO: 8.7 FL (ref 6–12)
POTASSIUM SERPL-SCNC: 5.4 MMOL/L (ref 3.5–4.7)
PROT SERPL-MCNC: 7.1 G/DL (ref 6.3–8)
RBC # BLD AUTO: 2.64 10*6/MM3 (ref 3.77–5.28)
SODIUM SERPL-SCNC: 141 MMOL/L (ref 134–145)
WBC NRBC COR # BLD: 4.92 10*3/MM3 (ref 3.4–10.8)

## 2022-08-04 PROCEDURE — 99214 OFFICE O/P EST MOD 30 MIN: CPT | Performed by: NURSE PRACTITIONER

## 2022-08-04 PROCEDURE — 80053 COMPREHEN METABOLIC PANEL: CPT

## 2022-08-04 PROCEDURE — 90853 GROUP PSYCHOTHERAPY: CPT | Performed by: NURSE PRACTITIONER

## 2022-08-04 PROCEDURE — 36415 COLL VENOUS BLD VENIPUNCTURE: CPT

## 2022-08-04 PROCEDURE — 85025 COMPLETE CBC W/AUTO DIFF WBC: CPT

## 2022-08-04 PROCEDURE — 99212 OFFICE O/P EST SF 10 MIN: CPT | Performed by: NURSE PRACTITIONER

## 2022-08-04 RX ORDER — ARMODAFINIL 250 MG/1
250 TABLET ORAL DAILY
Qty: 30 TABLET | Refills: 2 | Status: SHIPPED | OUTPATIENT
Start: 2022-08-04 | End: 2022-08-31 | Stop reason: SDUPTHER

## 2022-08-04 RX ORDER — SERTRALINE HYDROCHLORIDE 100 MG/1
150 TABLET, FILM COATED ORAL DAILY
Qty: 135 TABLET | Refills: 0 | Status: SHIPPED | OUTPATIENT
Start: 2022-08-04 | End: 2022-08-31 | Stop reason: SDUPTHER

## 2022-08-04 RX ORDER — GABAPENTIN 300 MG/1
300 CAPSULE ORAL NIGHTLY
Qty: 30 CAPSULE | Refills: 1 | Status: SHIPPED | OUTPATIENT
Start: 2022-08-04 | End: 2022-08-31 | Stop reason: SDUPTHER

## 2022-08-04 NOTE — PROGRESS NOTES
Subjective  Patient ID: Juana Hall is a 61 y.o.. female seen in regularly scheduled group session.  Group participants have consented to group conducted via video through Zoom.  Telehealth provided in patient's home.  Location of provider: [facility]    Total Group Time, Face to Face via Zoom: 50 minutes  Total Group Participants: 6, plus facilitation per BETY Daly    Group Therapy  Group topics explored including development of new normal, short and long term effects of diagnosis and treatment, anticipitory anxiety, pain management, physical deconditioning, concern for loved ones, expectation of return to previous normal, physical limitations, anxiety surrounding adjusted treatment plan and new victories, sharing of good news. Members are welcoming of each other; many new participants get to known each other and identify common ground.     Counseling provided regarding reintroduction to activity through graded tasks, recognition and allowance of need for rest, importance of continued follow up , benefits of peer support including therapeutic factors of group, balancing avoidance with approach , discussion of need for restorative sleep , early and late treatment effects with potential strategies for managing persistent symptoms, benefits of exercise and strategies for managing barriers to engagement, behavioral activation, medication education, anticipatory anxiety, fears of recurrence, exploration of quality of life goals, effective communication strategies and allowance of self care. Considered impact of caregiver distress, grief and loss, management of physical sx, discussion of QOL issues with medical team, regular follow up, and normalization of feelings.    Discussed current programming available in Cancer Center and community.    Patient response to group: Pt shared appropriately and participated openly in group setting.    Medications Management  BETY Daly present  for medication discussion and management.  Pt continues in survivorship of recurrent, metastatic breast cancer.    Constitutional Exam: Pt is alert, oriented, and engaged in conversation; affect and mood euthymic.  CC: anxiety, depression  HPI: Pt continues to identify challenges associated with treatment for metastatic breast cancer alongside acute grief with recent loss of son. Appreciates benefit to armodafinil, despite residual symptomatology. Sleep remains disrupted, despite use of Seroquel, continuing to identify negative images of son in last days/ at  home. Meds reviewed; pt no longer taking gabapentin, historically beneficial to sleep. Pt does continue on dialysis. Pt acknowledges PTSD sx, some ambivalence/ hopelessness regarding ability to work toward resolution of this distress. Is currently looking forward to upcoming trip with , despite adjustment due to family members covid dx. Attempting to approach one day at a time.  Psychological ROS: positive for - anxiety, depression, sleep disturbances and fatigue  Current medication regimen, inclusive of Seroquel (not helping), armodafinil 250 in am, and zoloft 150 mg daily reviewed and renewed as appropriate. Instruction provided to wean and dc seroquel. Will readd gabapentin 300 mg q hs; considered excretion through kidneys and will monitor low dose. Follow up arranged in group in one month.    Diagnoses and all orders for this visit:    1. Anxiety and depression  -     gabapentin (Neurontin) 300 MG capsule; Take 1 capsule by mouth Every Night.  Dispense: 30 capsule; Refill: 1  -     sertraline (ZOLOFT) 100 MG tablet; Take 1.5 tablets by mouth Daily.  Dispense: 135 tablet; Refill: 0    2. Fatigue, unspecified type  -     Armodafinil 250 MG tablet; Take 1 tablet by mouth Daily.  Dispense: 30 tablet; Refill: 2    3. ANDREW (obstructive sleep apnea)  -     Armodafinil 250 MG tablet; Take 1 tablet by mouth Daily.  Dispense: 30 tablet; Refill:  2

## 2022-08-04 NOTE — PROGRESS NOTES
"Chief Complaint  Metastatic lobular breast cancer (ER/ID positive, HER-2/tj negative), anemia secondary to CKD3, CHF, peripheral neuropathy, chemotherapy related nausea chemotherapy-induced myelosuppression    Subjective        History of Present Illness  The patient returns today in follow-up.  She was seen last week by Dr. Irvin for PET scan review.  She had evidence of new bony metastasis in the anterior portion of the right sixth rib.  With continued slow progression of disease we discussed alternative options for treatment.  Patient wants to avoid IV chemotherapy if at all possible.  We therefore discussed maintaining Faslodex while switching from Ibrance to Verzenio.  Patient underwent formal education with our pharmacy team and did begin this medication just yesterday.    Last week when seen her hemoglobin had also dropped significantly to 6.4.  She was sent for PRBC transfusion and hemoglobin has improved 8.1 today.  We did asked her to collect stools for Hemoccult which did return positive.  We have referred her to GI and she will see Dr. Kinsey later this month.  She is feeling fine today with no new concerns.      We are pushing her next Faslodex treatment out 1 week so she can go on a trip with her family to [place].    She denies other concerns today.    Objective   Vital Signs:   /65 Comment: taken on forearm  Pulse 73   Temp 98.4 °F (36.9 °C) (Temporal)   Resp 16   Ht 170 cm (66.93\")   Wt (!) 167 kg (368 lb 2.7 oz)   SpO2 100%   BMI 57.79 kg/m²     Physical Exam  Constitutional:       Appearance: She is well-developed. She is obese.      Comments: Patient is in a motorized scooter today   Eyes:      Conjunctiva/sclera: Conjunctivae normal.   Neck:      Thyroid: No thyromegaly.   Cardiovascular:      Rate and Rhythm: Normal rate and regular rhythm.      Heart sounds: Murmur heard.     No friction rub. No gallop.      Comments: 2/6 murmur  Pulmonary:      Effort: No respiratory distress. "      Breath sounds: Normal breath sounds.      Comments: Dialysis access in place in the right subclavian position  Chest:   Breasts:      Right: No supraclavicular adenopathy.      Left: No supraclavicular adenopathy.       Abdominal:      General: Bowel sounds are normal. There is no distension.      Palpations: Abdomen is soft.      Tenderness: There is no abdominal tenderness.   Musculoskeletal:         General: Swelling present.      Comments: 1+ bilateral lower extremity edema with venous stasis changes noted.   Lymphadenopathy:      Head:      Right side of head: No submandibular adenopathy.      Cervical: No cervical adenopathy.      Upper Body:      Right upper body: No supraclavicular adenopathy.      Left upper body: No supraclavicular adenopathy.   Skin:     General: Skin is warm and dry.      Findings: No bruising or rash.   Neurological:      Mental Status: She is alert and oriented to person, place, and time.      Cranial Nerves: No cranial nerve deficit.      Motor: No abnormal muscle tone.      Deep Tendon Reflexes: Reflexes normal.   Psychiatric:         Behavior: Behavior normal.        I have reexamined the patient and the results are consistent with the previously documented exam. Ansley Flores, APRN     Result Review :   Results from last 7 days   Lab Units 08/04/22  0939   WBC 10*3/mm3 4.92   NEUTROS ABS 10*3/mm3 3.56   HEMOGLOBIN g/dL 8.1*   HEMATOCRIT % 27.7*   PLATELETS 10*3/mm3 269                    Assessment and Plan    *Metastatic lobular breast cancer (ER/IL positive, HER-2/tj negative):  · Previous stage IIIC right breast cancer in 2003, received neoadjuvant chemotherapy (unknown regimen) with subsequent bilateral mastectomies 1/22/2004 with right axillary dissection.  Residual 10-12 cm invasive lobular carcinoma on the right breast with 14/16+ nodes with extranodal extension.  Received adjuvant carboplatin and Navelbine chemotherapy x4 cycles  · Attempted adjuvant radiation  to the chest wall however interrupted due to cellulitis that required removal of implants in August 2004  · Received tamoxifen from 6/22/2004 until June 2009.  Transitioned to Femara and received until June 2014  · Identification of CT findings concerning for carcinomatosis on CT scans and June 2019.  · PET scan confirmed hypermetabolic activity in the omentum as well as small retroperitoneal lymph nodes.  · CT-guided omental biopsy 7/30/2019 with metastatic lobular carcinoma of breast primary, ER positive (greater than 95%), W[place] positive (90%), HER-2/tj negative (1+ IHC)  · [facility] liquid biopsy 2/5/2020: MSI undetermined, mutations in BETH, NF1, CHEK2.  No evidence of PIK3CA mutation.  · Subsequent [facility] germline testing 11/12/2021 with BETH VUS (c.2864C>A) and POLE VUS (c.631A>T)  · Initiation of Aromasin and Ibrance in August 2019  · Difficulty with myelosuppression related to Ibrance requiring dose alterations, eventually changed to 100 mg for 7 days on followed by 7 days off.  · CT chest abdomen pelvis 10/26/2021 with increase in left hydronephrosis with increase in left perinephric and periureteral stranding. Decrease in stone in the left kidney lower pole (7 mm).  Stable small retroperitoneal lymph and left periaortic lymph nodes.  · PET scan 11/10/2021 with multiple bilateral hypermetabolic nodular pulmonary lesions, asymmetric moderate to severe left hydronephrosis with ill-defined fat stranding and soft tissue thickening in the renal sinus and surrounding the left ureter, hypermetabolic left retroperitoneal lymph node with slight increase in size, ill-defined hypermetabolic thickening in the inferior omentum with increase in size, uptake and C7 (indeterminate, recommended MRI).  Short segments of uptake in the mid and distal esophagus possibly related to gastritis.  · PET scan findings felt to be consistent with progressive malignancy.  Patient declined repeat omental biopsy.   · Options for  further treatment discussed on 11/12/2021.  In light of renal failure and dialysis, options are more limited.  Recommendation to continue Ibrance and discontinue Aromasin, begin Faslodex.  Discussed possible future use of single agent Taxol.    · Aromasin discontinued 11/12/2021  · On 11/22/2021 initiation of Faslodex with continuation of Ibrance (100 mg daily for 7 days on followed by 7 days off).  · MRI cervical spine 11/18/2021 showed the right C7 normality to likely represent metastatic disease.  With solitary bone lesion, did not recommend pursuit of bone modifying agent.  · Following 2 cycles Faslodex/Ibrance, PET scan on 1/11/2022 showed no change in the soft tissue superior to the dome of the bladder with hypermetabolic activity (likely related to carcinomatosis).  Significant decrease in injection of fat around the left renal pelvis and stable retroperitoneal hypermetabolic lymph nodes, decrease in size and activity in small bilateral pulmonary nodules.  Findings felt to represent evidence of response to treatment.    · Ibrance held briefly beginning 1/28/2022 due to hospitalization with COVID-19 infection and C. difficile colitis.  Patient developed leukopenia/neutropenia.  Ibrance resumed at previous dosing (100 mg daily 7 days on followed by 7 days off) on 2/9/22.  · Following 5 cycles Faslodex/Ibrance PET scan 4/19/2022 with evidence of mixed response.  New hypermetabolic lesion spinous process L2 (SUV 5.1) and slight increase in activity left clavicular metastasis (SUV increased 4.6-8.1).  C7 hypermetabolic mixed lytic and blastic bone lesion was unchanged.  Decrease in uptake involving omental thickening.  Stable soft tissue thickening around the left renal pelvis and ureter.  Discussion again regarding potential pursuit of IV chemotherapy with Taxol versus continuation of Ibrance and Faslodex with radiation to sites of bony disease at L2, left clavicle, C7.  Patient opted to defer initiation of  systemic chemotherapy and continue Ibrance/Faslodex with consideration of radiation.  · Initiated monthly Xgeva on 5/19/2022 (cleared with nephrology).  Xgeva subsequently held due to significant hypocalcemia.  Decision made to forego further Xgeva with persistent hypocalcemia.  · Palliative radiation received to lumbar spine regarding L2 metastasis 5/23- 6/7/2022  · Ibrance held during radiation therapy beginning 5/22/2022.  Ibrance resumed 6/16/2022.  · PET scan 7/21/2022 with stable hypermetabolic abdominal pelvic lymph nodes and subcarinal lymph node, stable bone lesions at C7, left clavicle.  Stable soft tissue thickening around the left renal pelvis with left hydronephrosis.  Uptake in adrenal glands felt to be likely reactive (no change in size).  No activity noted at L2 (previously radiated).  New hypermetabolic lesion right anterior sixth rib.  · With evidence of further slight progression in bone on PET scan (new right sixth rib lesion), on 7/28/2022 discontinued Ibrance and plan to transition to abemaciclib with continuation of Faslodex.  Plan to begin abemaciclib at reduced dose 50 mg twice daily on 8/4/2022.  Consider subsequent dose escalation if tolerated.  · 8/4/2022 patient is reviewed back today actually having started Verzenio 50 mg twice daily as of yesterday.  Hemoglobin has improved up to 8.1 after transfusion last week.  We reviewed again side effects for which to monitor with Verzenio.  She denies other concerns at this time and will return as scheduled.    *Anemia secondary to ESRD, exacerbated by Ibrance:  · Anemia secondary to ESRD, malignancy with exacerbation by use of Ibrance  · Previous evidence of folate deficiency 8/12/2019 with level 3.84, initiated folic acid 1 mg daily  · Previous evidence of iron deficiency, received Injectafer on 8/7/2020 750 mg IV x1 dose.  Second dose not administered due to onset of fever, subsequent iron studies precluded further administration of IV  iron.  · Previous low normal B12 level initiated oral B12 1000 mcg daily.  · Patient had previously received Procrit and required intermittent transfusion support  · Following initiation of hemodialysis, management of BEAU transitioned to nephrology, receiving Epogen with dialysis.  · Ongoing transfusion support prompted alteration in Ibrance dosing on 8/23/2021.  · After alteration in Ibrance dosing, hemoglobin improved and remained stable in the 9-10 range.  · Improved but ongoing intermittent need for transfusion support despite Ibrance dose alteration  · Stool negative for occult blood x3 on 11/2/2021  · Patient with reduced dialysis frequency to 2 days/week with concurrent decrease in Epogen dosing corresponding again to increased transfusion requirement.  · Epogen dose increased per nephrology to 20,000 units twice weekly with dialysis on Mondays and Fridays  · Ibrance again exacerbating anemia with ongoing intermittent transfusion requirements  · Additional transfusions required with hemoglobin on 3/31/2022 6.6, hemoglobin 4/21/2022 of 6.4, hemoglobin on 5/5/2022 of 6.8, hemoglobin 6.4 on 7/14/2022, hemoglobin 6.4 on 7/28/2022.  · Hemoglobin improved to 8.1 today.  We did have her collect stool cards which returned positive for blood.  She has been referred to GI and is scheduled to see them in 2 weeks.  She is continuing on Epogen 20,000 units Mondays and Fridays with dialysis as well as IV iron on Mondays.      *ESRD on HD:  · Patient with previous CKD3/4  · Hospitalization 4/29-5/4/2021 with RYAN/CKD, UTI, anemia.  Required initiation of hemodialysis Tuesday Thursday Saturday.   · Decrease in frequency of dialysis to twice per week.  She continues to produce a significant amount of urine.   · Status post left upper extremity AV fistula placement on 11/3/2021 by vascular surgery  · Attempt to hold further dialysis on 1/11/2022 with close monitoring of her laboratory and clinical parameters.  Dialysis quickly  resumed due to development of hyperkalemia.  · Patient continues on dialysis 2 days/week on Mondays and Fridays.  Per patient report potential future consideration to hold dialysis again in the future.  Patient is followed closely by Dr. Antonio.      *CHF with severe aortic stenosis:  · Echocardiogram 7/12/2019 showing ejection fraction 48% with moderate dilation of the LV cavity, global hypokinesis, grade 2 diastolic dysfunction, mild to moderate aortic stenosis, trivial pericardial effusion.  · Echocardiogram 7/19/2021 with ejection fraction 56%, left atrial volume moderately increased, grade 2 diastolic dysfunction, severe calcification aortic valve, moderate aortic stenosis, severe mitral calcification, mild mitral stenosis, marked elevation in RVSP from tricuspid regurgitation.  · Echocardiogram on 7/13/2022 with ejection fraction 56-60%, grade 2 diastolic dysfunction, severe aortic stenosis.    · She is awaiting subsequent visit in August to discuss recommendations for intervention in regards to her aortic valve whether that be with open heart surgery or catheter-based approach.  Open heart surgery would likely be high risk given her metastatic malignancy, renal failure, obesity.    *Left upper and bilateral lower extremity peripheral neuropathy:  · Patient reports that left upper extremity neuropathy was not present from previous chemotherapy but occurred after hospitalization that required intubation and critical care stay.  Apparently felt to represent critical care neuropathy.  Improved over time.  · Bilateral lower extremity neuropathy felt to be related to diabetes  · May have future implications regarding treatment for breast cancer.  · Patient reports that peripheral neuropathy symptoms continue in the bilateral lower extremities, unchanged.  This will be a consideration with potential future use of Taxol    *Uterine/endometrial activity on PET scan:  · Patient experienced postmenopausal vaginal  bleeding in 2013, negative endometrial biopsy and gynecologic evaluation.  · With findings on PET scan with enlarging uterus and some hypermetabolic activity, referred back to Dr. Oliveros in gynecology.    · 9/19/2019 D&C with hysteroscopy by Dr. Oliveros, no significant intraoperative findings, pathologic results with benign findings, no evidence of malignancy.    *Nausea:  · Mild, relieved with Zofran.   · Patient experienced improvement in nausea after initiating hemodialysis  · No recent nausea    *Myelosuppression secondary to Ibrance:  · WBC during hospitalization 1/28-1/30/2022 declined into the 1.9-2.0 range due to concurrent COVID-19 infection.  Ibrance was held briefly.  · Ibrance resumed on 2/9/2022 with WBC up to 4.49, ANC 3.07  · Ibrance discontinued in July 2022.  Patient transitioned to Verzenio.  Continue to monitor counts.  · Today, WBC 4.9, ANC 3.5    *Depression  · Patient with history of depression, worsened after the death of her son in November 2021  · With evidence of progressive malignancy in November 2021, patient agreeable to referral to supportive oncology  · Patient was seen in supportive oncology 11/18/2021, Zoloft increased from 50 up to 100 mg daily.  Prescribed armodafinil 50 mg daily however there or issues with insurance coverage  · Patient does continue with difficulties regarding depression that wax and wane    *Left perinephric stranding secondary to malignancy with hydronephrosis:  · CT 4/22/2021 showed interval enlargement of 2 staghorn calculi in the left kidney with persistent left perinephric stranding  · Hospitalization 4/29-5/4/2021 with RYAN/CKD, UTI, anemia.  Required 2 unit PRBC transfusion and initiated hemodialysis Tuesday Thursday Saturday.    · Discussion from urology regarding potential need for surgical procedure to address staghorn calculus left kidney  · CT scan 7/2/2021 with only 1 remaining left renal stone identified which had decreased in size.  Patient appears  to have passed the other stone.  · As above, CT 10/26/2021 with more prominent left hydronephrosis and increase in left perinephric and periureteral stranding.  Felt to possibly be related to passage of recent stone versus partial obstruction secondary to debris or thrombus in the left ureter versus pyelonephritis.  Patient however with no clinical evidence of recent stone passage nor pyelonephritis. Malignancy felt to be the cause of CT changes around the left kidney at this point.   · Left ureteral stent replacement 12/16/2021  · PET scan 1/11/2022 with decrease in fat injection near left renal pelvis, stent in satisfactory position.  Mild residual hydronephrosis on the left.  · Ureteral stent replaced on 3/10/2022  · PET scan 4/19/2022 with no hydronephrosis, left ureteral stent in place  · PET scan 7/21/2022 with persistent left hydronephrosis, ureteral stent in place    *Right thyroid nodules  · Patient underwent prior thyroid biopsy by her report with benign findings many years ago, records not available  · On MRI cervical spine 11/18/2021, finding of 1.8 cm right thyroid nodule  · Thyroid ultrasound 11/26/2021 with right-sided thyroid nodules, 1 nodule was hypoechoic, solid measuring 2 cm with biopsy recommended.  · Thyroid ultrasound results reviewed with patient.  In light of her metastatic breast cancer, renal failure the significance of the thyroid nodule is felt to be much less.  We discussed possibility of thyroid biopsy however patient is inclined to forego this, especially since she has had a biopsy performed in the past with benign findings by her report.    · No hypermetabolic activity identified on PET scan 1/11/2022 in the neck    *Hospitalization 1/28-1/30/2022 due to COVID-19 infection and C. difficile colitis  · Patient fully vaccinated with 3 doses Moderna COVID-19 vaccine  · Patient developed symptoms 1/26/2022 with abrupt onset sinus congestion, mild cough, subsequently developed nausea and  diarrhea on 1/27/2022.  Significant fatigue.  · Patient tested positive in emergency department on 1/28/2022 and was admitted  · Patient maintained O2 saturation in mid 90% range on room air  · Patient received remdesivir  · Patient was also found to be positive for C. difficile colitis, received course of oral vancomycin.  · Symptoms from both COVID-19 and C. difficile colitis ultimately resolved.    *Hypocalcemia  · Patient developed significant hypocalcemia after receiving Xgeva on 5/19/2022  · Calcium management per nephrology  · No further Xgeva planned due to persistent significant hypocalcemia, calcium 7.9 today.    Plan:  1. Patient will continue Verzenio 50 mg twice daily, initiated just yesterday.  2. She is scheduled now to see GI for Hemoccult positive stools and ongoing issues with anemia per above, scheduled 8/16/2022 (after she returns from her vacation).  3. Continue oral folic acid 1 mg daily, B12 1000 mcg daily.  4. The patient is continuing on hemodialysis every Monday and Friday and is receiving Epogen 20,000 units with each dialysis per nephrology.  Patient is also receiving IV iron every Monday with dialysis.  5. Patient is following up on 8/18/2022 with cardiology to discuss options for management of her aortic stenosis.  6. Patient will be on vacation next week and will not be able to return for Faslodex  7. In 2 weeks NP visit with CBC, CMP and Faslodex.  She will be continuing on abemaciclib at 50 mg twice daily  8. MD visit in 3 weeks with CBC, CMP.  We will assess tolerance of abemaciclib and consider whether dose increase could be performed.    Patient continues on high risk medication requiring intensive monitoring.

## 2022-08-09 ENCOUNTER — SPECIALTY PHARMACY (OUTPATIENT)
Dept: PHARMACY | Facility: HOSPITAL | Age: 62
End: 2022-08-09

## 2022-08-09 NOTE — PROGRESS NOTES
MTM telephone encounter re:adherence and side effects (Verzenio)     Juana reports starting Verzenio on 8/3/22. Thus far, patient denies side effects. . Thus far, no missed doses and 1 medication change; started gabapentin. Advised pt to call office if any changes. Patient expressed understanding and had no additional questions at this time.       Wiley España, Ryley, BCOP  8/9/2022 15:47 EDT

## 2022-08-16 ENCOUNTER — OFFICE VISIT (OUTPATIENT)
Dept: GASTROENTEROLOGY | Facility: CLINIC | Age: 62
End: 2022-08-16

## 2022-08-16 VITALS — BODY MASS INDEX: 45.99 KG/M2 | WEIGHT: 293 LBS | TEMPERATURE: 97.5 F | HEIGHT: 67 IN

## 2022-08-16 DIAGNOSIS — K62.5 RECTAL BLEEDING: ICD-10-CM

## 2022-08-16 DIAGNOSIS — C50.919 METASTATIC BREAST CANCER: Chronic | ICD-10-CM

## 2022-08-16 DIAGNOSIS — E66.01 MORBID OBESITY WITH BMI OF 50.0-59.9, ADULT: Chronic | ICD-10-CM

## 2022-08-16 DIAGNOSIS — I35.0 NONRHEUMATIC AORTIC VALVE STENOSIS: Chronic | ICD-10-CM

## 2022-08-16 DIAGNOSIS — N18.6 ESRD (END STAGE RENAL DISEASE): ICD-10-CM

## 2022-08-16 DIAGNOSIS — D64.9 CHRONIC ANEMIA: Primary | Chronic | ICD-10-CM

## 2022-08-16 PROCEDURE — 99204 OFFICE O/P NEW MOD 45 MIN: CPT | Performed by: INTERNAL MEDICINE

## 2022-08-16 NOTE — PROGRESS NOTES
Chief Complaint   Patient presents with   • Anemia       Subjective     HPI    Juana Hall is a 61 y.o. female with a past medical history noted below who presents for anemia.  History is notable for metastatic breast cancer, severe aortic stenosis, ESRD on HD, and super-morbid obesity.      Her anemia has been attributed to anemia of chronic disease/chronic kidney disease with significant elevated ferritin and macrocytic indices.  It is also thought that her Ibrance chemotherapy was impacting her anemia as well.  She has been treated with Epogen.  She has required blood transfusions in the past when her hemoglobin is gone down into the 6 level, usually about 1 time per month.  She has not had any overt bleeding in the past and has had a number of heme-negative stool testing.    1 month ago however, she had an episode of about 3 days where she had bright red blood with bowel movements.  It was about 3 episodes in a row.  Was associated with a hard stool.  It has since resolved.  She is back to her baseline of brown stools.  Denies any nausea, vomiting, abdominal pain.  No melenic stools.  No diarrhea.    She has never had a colonoscopy.  She has not had any abdominal surgeries other than a .  She does not drink alcohol.  She does not smoke cigarettes.  She has no family history of any GI malignancy to her knowledge.    She is now on a new chemotherapy for the past 2 weeks that is supposed to not cause as significant anemia.  She is also going to see Dr. Rivers in 2 days regarding treating her aortic valve stenosis.    She did have cystoscopy with ureteral stent placement in March of this year.  This was done in the OR.  No significant adverse effects from that procedure and anesthesia.    Today's visit was in the office.  Both the patient and I were wearing face masks and proper hand hygiene was performed before and after the physical exam.           Current Outpatient Medications:   •  Abemaciclib 50  MG tablet, Take 1 tablet by mouth 2 (Two) Times a Day., Disp: 60 tablet, Rfl: 2  •  acetaminophen (TYLENOL) 500 MG tablet, Take 500 mg by mouth As Needed., Disp: , Rfl:   •  amLODIPine (NORVASC) 5 MG tablet, Take 1 tablet by mouth Daily., Disp: 90 tablet, Rfl: 3  •  Armodafinil 250 MG tablet, Take 1 tablet by mouth Daily., Disp: 30 tablet, Rfl: 2  •  aspirin 81 MG EC tablet, Take 81 mg by mouth Daily. HELD FOR SURGERY, Disp: , Rfl:   •  atorvastatin (LIPITOR) 40 MG tablet, Take 1 tablet by mouth Every Night., Disp: 90 tablet, Rfl: 1  •  carvedilol (COREG) 3.125 MG tablet, Take 1 tablet by mouth 2 (Two) Times a Day., Disp: 180 tablet, Rfl: 3  •  folic acid (FOLVITE) 1 MG tablet, TAKE ONE TABLET BY MOUTH DAILY (Patient taking differently: Take 1 mg by mouth Daily.), Disp: 30 tablet, Rfl: 2  •  folic acid (FOLVITE) 1 MG tablet, Take 1 tablet by mouth Daily., Disp: 30 tablet, Rfl: 3  •  gabapentin (Neurontin) 300 MG capsule, Take 1 capsule by mouth Every Night., Disp: 30 capsule, Rfl: 1  •  Levemir FlexTouch 100 UNIT/ML injection, Inject 40 Units under the skin into the appropriate area as directed Daily., Disp: 13 pen, Rfl: 1  •  levothyroxine (SYNTHROID, LEVOTHROID) 50 MCG tablet, Take 1 tablet by mouth Daily., Disp: 90 tablet, Rfl: 1  •  ondansetron (Zofran) 8 MG tablet, Take 1 tablet by mouth Every 8 (Eight) Hours As Needed for Nausea or Vomiting., Disp: 30 tablet, Rfl: 2  •  QUEtiapine (SEROquel) 100 MG tablet, Take 1 tablet by mouth Every Night., Disp: 90 tablet, Rfl: 0  •  sertraline (ZOLOFT) 100 MG tablet, Take 1.5 tablets by mouth Daily., Disp: 135 tablet, Rfl: 0  •  vitamin B-12 (CYANOCOBALAMIN) 1000 MCG tablet, Take 1,000 mcg by mouth Daily., Disp: , Rfl:   •  vitamin D (ERGOCALCIFEROL) 82954 units capsule capsule, Take 50,000 Units by mouth Every 7 (Seven) Days. WEDNESDAY, Disp: , Rfl:       Objective     Vitals:    08/16/22 1517   Temp: 97.5 °F (36.4 °C)         08/16/22 1517   Weight: (!) 163 kg (360 lb)      Body mass index is 56.38 kg/m².    Physical Exam  Constitutional:       General: She is not in acute distress.     Appearance: She is obese.   Pulmonary:      Effort: Pulmonary effort is normal.   Musculoskeletal:      Comments: Uses scooter for ambulation   Neurological:      Mental Status: She is alert and oriented to person, place, and time.   Psychiatric:         Mood and Affect: Mood normal.         Behavior: Behavior normal.         Thought Content: Thought content normal.         Judgment: Judgment normal.             WBC   Date Value Ref Range Status   08/04/2022 4.92 3.40 - 10.80 10*3/mm3 Final   03/16/2019 7.6 3.4 - 10.8 x10E3/uL Final     RBC   Date Value Ref Range Status   08/04/2022 2.64 (L) 3.77 - 5.28 10*6/mm3 Final   03/16/2019 3.80 3.77 - 5.28 x10E6/uL Final     Hemoglobin   Date Value Ref Range Status   08/04/2022 8.1 (L) 12.0 - 15.9 g/dL Final     Hematocrit   Date Value Ref Range Status   08/04/2022 27.7 (L) 34.0 - 46.6 % Final     MCV   Date Value Ref Range Status   08/04/2022 104.9 (H) 79.0 - 97.0 fL Final     MCH   Date Value Ref Range Status   08/04/2022 30.7 26.6 - 33.0 pg Final     MCHC   Date Value Ref Range Status   08/04/2022 29.2 (L) 31.5 - 35.7 g/dL Final     RDW   Date Value Ref Range Status   08/04/2022 16.8 (H) 12.3 - 15.4 % Final     RDW-SD   Date Value Ref Range Status   08/04/2022 63.3 (H) 37.0 - 54.0 fl Final     MPV   Date Value Ref Range Status   08/04/2022 8.7 6.0 - 12.0 fL Final     Platelets   Date Value Ref Range Status   08/04/2022 269 140 - 450 10*3/mm3 Final     Neutrophil %   Date Value Ref Range Status   08/04/2022 72.4 42.7 - 76.0 % Final     Lymphocyte %   Date Value Ref Range Status   08/04/2022 10.6 (L) 19.6 - 45.3 % Final     Monocyte %   Date Value Ref Range Status   08/04/2022 9.3 5.0 - 12.0 % Final     Eosinophil %   Date Value Ref Range Status   08/04/2022 4.9 0.3 - 6.2 % Final     Basophil %   Date Value Ref Range Status   08/04/2022 0.8 0.0 - 1.5 %  Final     Immature Grans %   Date Value Ref Range Status   08/04/2022 2.0 (H) 0.0 - 0.5 % Final     Neutrophils, Absolute   Date Value Ref Range Status   08/04/2022 3.56 1.70 - 7.00 10*3/mm3 Final     Lymphocytes, Absolute   Date Value Ref Range Status   08/04/2022 0.52 (L) 0.70 - 3.10 10*3/mm3 Final     Monocytes, Absolute   Date Value Ref Range Status   08/04/2022 0.46 0.10 - 0.90 10*3/mm3 Final     Eosinophils, Absolute   Date Value Ref Range Status   08/04/2022 0.24 0.00 - 0.40 10*3/mm3 Final     Basophils, Absolute   Date Value Ref Range Status   08/04/2022 0.04 0.00 - 0.20 10*3/mm3 Final     Immature Grans, Absolute   Date Value Ref Range Status   08/04/2022 0.10 (H) 0.00 - 0.05 10*3/mm3 Final     nRBC   Date Value Ref Range Status   08/04/2022 0.0 0.0 - 0.2 /100 WBC Final       Lab Results   Component Value Date    GLUCOSE 113 08/04/2022    BUN 48 (H) 08/04/2022    CREATININE 4.42 (C) 08/04/2022    EGFRIFNONA 11 (L) 02/16/2022    EGFRIFAFRI  01/29/2022      Comment:      <15 Indicative of kidney failure.    BCR 10.9 08/04/2022    CO2 22.7 08/04/2022    CALCIUM 7.5 (L) 08/04/2022    PROTENTOTREF 7.0 03/16/2019    ALBUMIN 3.30 (L) 08/04/2022    LABIL2 1.2 03/16/2019    AST 34 (H) 08/04/2022    ALT 25 08/04/2022       PET scan    IMPRESSION:  1.  Evaluation is significantly suboptimal for the above stated reasons.  Please note that PET imaging of the head and neck is essentially  nondiagnostic due to the degree of misregistration artifact.  2.  Scattered hypermetabolic osseous lesions, as before, and likely  representing metastatic disease. New index hypermetabolic lesion within  the right sixth rib. A few additional index lesions are grossly  unchanged mildly decreased in there degree of FDG uptake as detailed  above.  3.   Previously seen hypermetabolic abdominopelvic adenopathy has  decreased in FDG uptake and remains grossly unchanged in size over  multiple prior CTs. A subcarinal node is now mildly  hypermetabolic but  also grossly unchanged in size. While size stability is reassuring,  continued close attention on follow-up is recommended to exclude  metastatic disease.  4.  New relatively symmetric hypermetabolism is present within the  bilateral adrenal glands which have a grossly unchanged appearance on  noncontrast CT over multiple prior CTs, favored to be reactive; however  continued attention on follow-up is recommended.  5.  Extensive soft tissue thickening surrounding the left renal pelvis,  as before, with nephroureteral stent in place and mild apparent  hydronephrosis.  6.  Other findings as above.     This report was finalized on 7/22/2022 7:26 AM by Dr. Ward Conde M.D.         I personally reviewed data as detailed below:     The labs listed above.    The radiology studies listed above.    Office notes from: 7/28/22 heme onc note    Assessment and Plan         Diagnoses and all orders for this visit:    1. Chronic anemia (Primary)    2. Rectal bleeding    3. Nonrheumatic aortic valve stenosis    4. Metastatic breast cancer (HCC)    5. ESRD (end stage renal disease) (HCC)    6. Morbid obesity with BMI of 50.0-59.9, adult (HCC)    Plan  This is a complicated case.  This is a very sick woman with significant comorbidities who has had a longstanding chronic anemia requiring transfusions for a number of years.  She is now sent here for the anemia and with an episode of rectal bleeding that occurred about a month ago and has since resolved.  She has never had overt bleeding before.    I discussed with her and her  today that if she is wanting to have everything done and both cardiology and Dr. Irvin are in agreement, could consider pursuing bidirectional endoscopy for further evaluation of her anemia and rectal bleeding.  This would have to be done in the OR with general anesthesia given her comorbidities and does carry significant risk.    The alternative is to continue what she has been  doing for the past number of years which is continue to monitor her hemoglobin and getting transfusions as needed.    She is going to follow-up with cardiology and Dr. Irvin later this week and discuss with them.  Both she and her  are wanting to get an idea of the plan for her aortic valve treatment before making a decision about pursuing endoscopy.      I have discussed the above plan with the patient.  They verbalize understanding and are in agreement with the plan.  They have been advised to contact the office for any questions, concerns, or changes related to their health.    Dictated utilizing Dragon dictation

## 2022-08-18 ENCOUNTER — LAB (OUTPATIENT)
Dept: LAB | Facility: HOSPITAL | Age: 62
End: 2022-08-18

## 2022-08-18 ENCOUNTER — INFUSION (OUTPATIENT)
Dept: ONCOLOGY | Facility: HOSPITAL | Age: 62
End: 2022-08-18

## 2022-08-18 ENCOUNTER — DOCUMENTATION (OUTPATIENT)
Dept: ONCOLOGY | Facility: CLINIC | Age: 62
End: 2022-08-18

## 2022-08-18 ENCOUNTER — TRANSCRIBE ORDERS (OUTPATIENT)
Dept: CARDIOLOGY | Facility: CLINIC | Age: 62
End: 2022-08-18

## 2022-08-18 ENCOUNTER — OFFICE VISIT (OUTPATIENT)
Dept: ONCOLOGY | Facility: CLINIC | Age: 62
End: 2022-08-18

## 2022-08-18 ENCOUNTER — OFFICE VISIT (OUTPATIENT)
Dept: CARDIOLOGY | Facility: CLINIC | Age: 62
End: 2022-08-18

## 2022-08-18 VITALS
RESPIRATION RATE: 18 BRPM | DIASTOLIC BLOOD PRESSURE: 71 MMHG | TEMPERATURE: 97.5 F | OXYGEN SATURATION: 95 % | HEART RATE: 81 BPM | SYSTOLIC BLOOD PRESSURE: 125 MMHG

## 2022-08-18 VITALS
DIASTOLIC BLOOD PRESSURE: 71 MMHG | HEART RATE: 72 BPM | HEIGHT: 67 IN | SYSTOLIC BLOOD PRESSURE: 125 MMHG | WEIGHT: 293 LBS | BODY MASS INDEX: 45.99 KG/M2

## 2022-08-18 DIAGNOSIS — C50.919 METASTATIC BREAST CANCER: ICD-10-CM

## 2022-08-18 DIAGNOSIS — Z01.818 OTHER SPECIFIED PRE-OPERATIVE EXAMINATION: Primary | ICD-10-CM

## 2022-08-18 DIAGNOSIS — N18.6 ESRD (END STAGE RENAL DISEASE): ICD-10-CM

## 2022-08-18 DIAGNOSIS — N18.6 ANEMIA DUE TO CHRONIC KIDNEY DISEASE, ON CHRONIC DIALYSIS: Primary | ICD-10-CM

## 2022-08-18 DIAGNOSIS — Z79.899 HIGH RISK MEDICATION USE: ICD-10-CM

## 2022-08-18 DIAGNOSIS — E66.01 OBESITY, MORBID, BMI 50 OR HIGHER: ICD-10-CM

## 2022-08-18 DIAGNOSIS — I35.0 AORTIC STENOSIS, SEVERE: ICD-10-CM

## 2022-08-18 DIAGNOSIS — C50.919 METASTATIC BREAST CANCER: Primary | ICD-10-CM

## 2022-08-18 DIAGNOSIS — I50.32 DIASTOLIC CHF, CHRONIC: ICD-10-CM

## 2022-08-18 DIAGNOSIS — Q23.1 BICUSPID AORTIC VALVE: Primary | ICD-10-CM

## 2022-08-18 DIAGNOSIS — Z99.2 ANEMIA DUE TO CHRONIC KIDNEY DISEASE, ON CHRONIC DIALYSIS: Primary | ICD-10-CM

## 2022-08-18 DIAGNOSIS — D63.1 ANEMIA DUE TO CHRONIC KIDNEY DISEASE, ON CHRONIC DIALYSIS: Primary | ICD-10-CM

## 2022-08-18 PROBLEM — Q23.81 BICUSPID AORTIC VALVE: Status: ACTIVE | Noted: 2022-08-18

## 2022-08-18 LAB
ABO GROUP BLD: NORMAL
ALBUMIN SERPL-MCNC: 3.3 G/DL (ref 3.5–5.2)
ALBUMIN/GLOB SERPL: 0.8 G/DL (ref 1.1–2.4)
ALP SERPL-CCNC: 92 U/L (ref 38–116)
ALT SERPL W P-5'-P-CCNC: 9 U/L (ref 0–33)
ANION GAP SERPL CALCULATED.3IONS-SCNC: 15.8 MMOL/L (ref 5–15)
ANTI-E: NORMAL
AST SERPL-CCNC: 8 U/L (ref 0–32)
BASOPHILS # BLD AUTO: 0.04 10*3/MM3 (ref 0–0.2)
BASOPHILS NFR BLD AUTO: 0.8 % (ref 0–1.5)
BILIRUB SERPL-MCNC: 0.2 MG/DL (ref 0.2–1.2)
BLD GP AB SCN SERPL QL: POSITIVE
BUN SERPL-MCNC: 71 MG/DL (ref 6–20)
BUN/CREAT SERPL: 9.7 (ref 7.3–30)
CALCIUM SPEC-SCNC: 8.7 MG/DL (ref 8.5–10.2)
CHLORIDE SERPL-SCNC: 101 MMOL/L (ref 98–107)
CO2 SERPL-SCNC: 20.2 MMOL/L (ref 22–29)
CREAT SERPL-MCNC: 7.35 MG/DL (ref 0.6–1.1)
DEPRECATED RDW RBC AUTO: 62 FL (ref 37–54)
EGFRCR SERPLBLD CKD-EPI 2021: 5.8 ML/MIN/1.73
EOSINOPHIL # BLD AUTO: 0.56 10*3/MM3 (ref 0–0.4)
EOSINOPHIL NFR BLD AUTO: 11.7 % (ref 0.3–6.2)
ERYTHROCYTE [DISTWIDTH] IN BLOOD BY AUTOMATED COUNT: 16.6 % (ref 12.3–15.4)
GLOBULIN UR ELPH-MCNC: 3.9 GM/DL (ref 1.8–3.5)
GLUCOSE SERPL-MCNC: 161 MG/DL (ref 74–124)
HCT VFR BLD AUTO: 22.7 % (ref 34–46.6)
HGB BLD-MCNC: 6.8 G/DL (ref 12–15.9)
IMM GRANULOCYTES # BLD AUTO: 0.06 10*3/MM3 (ref 0–0.05)
IMM GRANULOCYTES NFR BLD AUTO: 1.3 % (ref 0–0.5)
LYMPHOCYTES # BLD AUTO: 0.62 10*3/MM3 (ref 0.7–3.1)
LYMPHOCYTES NFR BLD AUTO: 13 % (ref 19.6–45.3)
MCH RBC QN AUTO: 30.9 PG (ref 26.6–33)
MCHC RBC AUTO-ENTMCNC: 30 G/DL (ref 31.5–35.7)
MCV RBC AUTO: 103.2 FL (ref 79–97)
MONOCYTES # BLD AUTO: 0.34 10*3/MM3 (ref 0.1–0.9)
MONOCYTES NFR BLD AUTO: 7.1 % (ref 5–12)
NEUTROPHILS NFR BLD AUTO: 3.15 10*3/MM3 (ref 1.7–7)
NEUTROPHILS NFR BLD AUTO: 66.1 % (ref 42.7–76)
NRBC BLD AUTO-RTO: 0 /100 WBC (ref 0–0.2)
PLATELET # BLD AUTO: 352 10*3/MM3 (ref 140–450)
PMV BLD AUTO: 8.6 FL (ref 6–12)
POTASSIUM SERPL-SCNC: 5.1 MMOL/L (ref 3.5–4.7)
PROT SERPL-MCNC: 7.2 G/DL (ref 6.3–8)
RBC # BLD AUTO: 2.2 10*6/MM3 (ref 3.77–5.28)
RH BLD: POSITIVE
SODIUM SERPL-SCNC: 137 MMOL/L (ref 134–145)
T&S EXPIRATION DATE: NORMAL
WBC NRBC COR # BLD: 4.77 10*3/MM3 (ref 3.4–10.8)

## 2022-08-18 PROCEDURE — 80053 COMPREHEN METABOLIC PANEL: CPT

## 2022-08-18 PROCEDURE — 86901 BLOOD TYPING SEROLOGIC RH(D): CPT

## 2022-08-18 PROCEDURE — 99214 OFFICE O/P EST MOD 30 MIN: CPT | Performed by: INTERNAL MEDICINE

## 2022-08-18 PROCEDURE — 86922 COMPATIBILITY TEST ANTIGLOB: CPT

## 2022-08-18 PROCEDURE — 93000 ELECTROCARDIOGRAM COMPLETE: CPT | Performed by: INTERNAL MEDICINE

## 2022-08-18 PROCEDURE — 86900 BLOOD TYPING SEROLOGIC ABO: CPT

## 2022-08-18 PROCEDURE — 25010000002 FULVESTRANT PER 25 MG: Performed by: NURSE PRACTITIONER

## 2022-08-18 PROCEDURE — 96402 CHEMO HORMON ANTINEOPL SQ/IM: CPT

## 2022-08-18 PROCEDURE — 86870 RBC ANTIBODY IDENTIFICATION: CPT

## 2022-08-18 PROCEDURE — 85025 COMPLETE CBC W/AUTO DIFF WBC: CPT

## 2022-08-18 PROCEDURE — 86850 RBC ANTIBODY SCREEN: CPT

## 2022-08-18 PROCEDURE — 86920 COMPATIBILITY TEST SPIN: CPT

## 2022-08-18 PROCEDURE — 99214 OFFICE O/P EST MOD 30 MIN: CPT | Performed by: NURSE PRACTITIONER

## 2022-08-18 PROCEDURE — 36415 COLL VENOUS BLD VENIPUNCTURE: CPT

## 2022-08-18 RX ORDER — SODIUM CHLORIDE 9 MG/ML
250 INJECTION, SOLUTION INTRAVENOUS AS NEEDED
Status: CANCELLED | OUTPATIENT
Start: 2022-08-20

## 2022-08-18 RX ORDER — FUROSEMIDE 10 MG/ML
40 INJECTION INTRAMUSCULAR; INTRAVENOUS ONCE
Status: CANCELLED | OUTPATIENT
Start: 2022-08-20 | End: 2022-08-18

## 2022-08-18 RX ORDER — FUROSEMIDE 10 MG/ML
40 INJECTION INTRAMUSCULAR; INTRAVENOUS ONCE
Status: DISCONTINUED | OUTPATIENT
Start: 2022-08-18 | End: 2022-08-21 | Stop reason: HOSPADM

## 2022-08-18 RX ORDER — FUROSEMIDE 10 MG/ML
40 INJECTION INTRAMUSCULAR; INTRAVENOUS ONCE
Status: CANCELLED | OUTPATIENT
Start: 2022-08-18

## 2022-08-18 RX ADMIN — FULVESTRANT 500 MG: 250 INJECTION INTRAMUSCULAR at 09:50

## 2022-08-18 NOTE — PROGRESS NOTES
Juana Hall  1960  Date of Office Visit: 08/18/22  Encounter Provider: Luis Rivers MD  Place of Service: [facility] CARDIOLOGY      CHIEF COMPLAINT:  Bicuspid aortic valve  Aortic valve stenosis  Breast cancer: Metastatic  Obesity  End-stage renal disease on hemodialysis  Chronic heart failure with preserved ejection fraction  Chronic normocytic anemia      HISTORY OF PRESENT ILLNESS:   61-year-old female with a medical history of metastatic breast cancer, end-stage renal disease on hemodialysis, normocytic anemia, obesity, immobility, chronic normocytic anemia, and bicuspid aortic valve with severe stenosis who presents to me for evaluation of the severe aortic valve stenosis.  She states that she does not do much walking but when she does she has dyspnea on exertion.  She denies any chest pain or syncope.  She does not appear to be dyspneic at rest.  She wears a CPAP at night and does not feel like she has orthopnea symptoms while wearing that.  Her blood pressure and heart rate are well controlled.  She continues to follow with hematology and oncology team and has intermittent blood transfusions.  She did undergo a transthoracic echocardiogram in July 2022 with severe degenerative aortic valve stenosis and a mean gradient of 45 mmHg across her bicuspid aortic valve.    Review of Systems   Constitutional: Negative for fever and malaise/fatigue.   HENT: Negative for nosebleeds and sore throat.    Eyes: Negative for blurred vision and double vision.   Cardiovascular: Negative for chest pain, claudication, palpitations and syncope.   Respiratory: Negative for cough, shortness of breath and snoring.    Endocrine: Negative for cold intolerance, heat intolerance and polydipsia.   Skin: Negative for itching, poor wound healing and rash.   Musculoskeletal: Negative for joint pain, joint swelling, muscle weakness and myalgias.   Gastrointestinal: Negative for abdominal  pain, melena, nausea and vomiting.   Neurological: Negative for light-headedness, loss of balance, seizures, vertigo and weakness.   Psychiatric/Behavioral: Negative for altered mental status and depression.          Past Medical History:   Diagnosis Date   • Anemia    • Anxiety and depression    • Bicuspid aortic valve    • Breast cancer (HCC) 2004    RIGHT, HAD NEELA. MASTECTOMY, CHEMO AND RADIATION   • CHF (congestive heart failure) (HCC)    • CKD (chronic kidney disease)    • Diabetes mellitus (HCC)    • Dialysis patient (HCC)     TUES AND SATURDAY DAVITA HAY   • Elevated cholesterol    • Frequent UTI    • Heart murmur    • History of kidney stones    • History of MRSA infection 2005    POST BREAST SURGERY-TREATED [facility]   • History of sepsis 2010    KIDNEY STONE   • History of transfusion    • Hyperlipidemia    • Hypertension    • Hyperthyroidism    • Hypothyroidism    • Kidney stone     CURRENT LEFT-DEC 2021   • Lymphedema of leg     LEFT   • Metastasis from breast cancer (HCC)     STOMACH-OMENTUM   • Neuromuscular disorder (HCC)    • Obesity    • ANDREW on CPAP     CPAP   • Osteoarthrosis    • Peripheral neuropathy     FEET BILAT   • Radiculopathy    • Rectal bleeding 8/16/2022   • Thickened endometrium    • Weakness     BILAT LEGS-WITH ANY AMT OF TIME       The following portions of the patient's history were reviewed and updated as appropriate: Social history , Family history and Surgical history     Current Outpatient Medications on File Prior to Visit   Medication Sig Dispense Refill   • Abemaciclib 50 MG tablet Take 1 tablet by mouth 2 (Two) Times a Day. 60 tablet 2   • acetaminophen (TYLENOL) 500 MG tablet Take 500 mg by mouth As Needed.     • amLODIPine (NORVASC) 5 MG tablet Take 1 tablet by mouth Daily. 90 tablet 3   • Armodafinil 250 MG tablet Take 1 tablet by mouth Daily. 30 tablet 2   • aspirin 81 MG EC tablet Take 81 mg by mouth Daily. HELD FOR SURGERY     • atorvastatin (LIPITOR) 40 MG tablet Take 1  "tablet by mouth Every Night. 90 tablet 1   • carvedilol (COREG) 3.125 MG tablet Take 1 tablet by mouth 2 (Two) Times a Day. 180 tablet 3   • folic acid (FOLVITE) 1 MG tablet TAKE ONE TABLET BY MOUTH DAILY (Patient taking differently: Take 1 mg by mouth Daily.) 30 tablet 2   • folic acid (FOLVITE) 1 MG tablet Take 1 tablet by mouth Daily. 30 tablet 3   • gabapentin (Neurontin) 300 MG capsule Take 1 capsule by mouth Every Night. 30 capsule 1   • Levemir FlexTouch 100 UNIT/ML injection Inject 40 Units under the skin into the appropriate area as directed Daily. 13 pen 1   • levothyroxine (SYNTHROID, LEVOTHROID) 50 MCG tablet Take 1 tablet by mouth Daily. 90 tablet 1   • ondansetron (Zofran) 8 MG tablet Take 1 tablet by mouth Every 8 (Eight) Hours As Needed for Nausea or Vomiting. 30 tablet 2   • QUEtiapine (SEROquel) 100 MG tablet Take 1 tablet by mouth Every Night. 90 tablet 0   • sertraline (ZOLOFT) 100 MG tablet Take 1.5 tablets by mouth Daily. 135 tablet 0   • vitamin B-12 (CYANOCOBALAMIN) 1000 MCG tablet Take 1,000 mcg by mouth Daily.     • vitamin D (ERGOCALCIFEROL) 32842 units capsule capsule Take 50,000 Units by mouth Every 7 (Seven) Days. WEDNESDAY       No current facility-administered medications on file prior to visit.       No Known Allergies    Vitals:    08/18/22 1306   Weight: (!) 163 kg (360 lb)   Height: 170.2 cm (67\")     Body mass index is 56.38 kg/m².   Constitutional:       Appearance: Well-developed.      Comments: Obese.  In wheelchair.   Eyes:      General: No scleral icterus.     Conjunctiva/sclera: Conjunctivae normal.   HENT:      Head: Normocephalic and atraumatic.   Neck:      Thyroid: No thyromegaly.      Vascular: Normal carotid pulses. No carotid bruit, hepatojugular reflux or JVD.      Trachea: No tracheal deviation.   Pulmonary:      Effort: No respiratory distress.      Breath sounds: Normal breath sounds. No decreased breath sounds. No wheezing. No rhonchi. No rales.   Chest:      " Chest wall: Not tender to palpatation.   Cardiovascular:      Normal rate. Regular rhythm.      No gallop.   Pulses:     Carotid: 2+ bilaterally.     Radial: 2+ bilaterally.     Femoral: 2+ bilaterally.     Dorsalis pedis: 2+ bilaterally.     Posterior tibial: 2+ bilaterally.  Edema:     Peripheral edema absent.   Abdominal:      General: Bowel sounds are normal. There is no distension.      Palpations: Abdomen is soft.      Tenderness: There is no abdominal tenderness.   Musculoskeletal:         General: No deformity.      Cervical back: Normal range of motion and neck supple. Skin:     Findings: No erythema or rash.   Neurological:      Mental Status: Alert and oriented to person, place, and time.      Sensory: No sensory deficit.   Psychiatric:         Behavior: Behavior normal.            Lab Results   Component Value Date    WBC 4.77 08/18/2022    HGB 6.8 (C) 08/18/2022    HCT 22.7 (L) 08/18/2022    .2 (H) 08/18/2022     08/18/2022       Lab Results   Component Value Date    GLUCOSE 161 (H) 08/18/2022    BUN 71 (H) 08/18/2022    CREATININE 7.35 (C) 08/18/2022    EGFRIFNONA 11 (L) 02/16/2022    EGFRIFAFRI  01/29/2022      Comment:      <15 Indicative of kidney failure.    BCR 9.7 08/18/2022    K 5.1 (H) 08/18/2022    CO2 20.2 (L) 08/18/2022    CALCIUM 8.7 08/18/2022    PROTENTOTREF 7.0 03/16/2019    ALBUMIN 3.30 (L) 08/18/2022    LABIL2 1.2 03/16/2019    AST 8 08/18/2022    ALT 9 08/18/2022       Lab Results   Component Value Date    GLUCOSE 161 (H) 08/18/2022    CALCIUM 8.7 08/18/2022     08/18/2022    K 5.1 (H) 08/18/2022    CO2 20.2 (L) 08/18/2022     08/18/2022    BUN 71 (H) 08/18/2022    CREATININE 7.35 (C) 08/18/2022    EGFRIFAFRI  01/29/2022      Comment:      <15 Indicative of kidney failure.    EGFRIFNONA 11 (L) 02/16/2022    BCR 9.7 08/18/2022    ANIONGAP 15.8 (H) 08/18/2022       Lab Results   Component Value Date    CHLPL 122 02/16/2022    TRIG 185 (H) 02/16/2022    HDL 37  (L) 02/16/2022    LDL 54 02/16/2022         ECG 12 Lead    Date/Time: 8/18/2022 1:55 PM  Performed by: Luis Rivers MD  Authorized by: Luis Rivers MD   Comparison: compared with previous ECG from 1/28/2022  Similar to previous ECG  Rhythm: sinus rhythm  Rate: normal  Conduction: 1st degree AV block  QRS axis: normal    Clinical impression: non-specific ECG  Comments: Nonspec. t abnormalities lateral leads             Results for orders placed during the hospital encounter of 07/13/22    Adult Transthoracic Echo Complete w/ Color, Spectral and Contrast if Necessary Per Protocol    Interpretation Summary  · Left ventricular ejection fraction appears to be 56 - 60%. Left ventricular systolic function is normal.  · Left ventricular wall thickness is consistent with mild concentric hypertrophy  · Left ventricular diastolic function is consistent with (grade II w/high LAP) pseudonormalization.  · Normal right ventricular cavity size and systolic function noted.  · The left atrial cavity is mildly dilated.  · Severe aortic valve stenosis is present.  · Aortic valve area is 0.84 cm2. Peak velocity of the flow distal to the aortic valve is 449.3 cm/s. Aortic valve maximum pressure gradient is 80.8 mmHg. Aortic valve mean pressure gradient is 45.9 mmHg  · There is severe mitral annular calcification the mitral valve leaflets are thickened and appear to be restricted in motion  · Moderate mitral valve stenosis is present. The mitral valve peak gradient is 16.2 mmHg. The mitral valve mean gradient is 6.2 mmHg.  · Mild tricuspid valve regurgitation is present.  · Calculated right ventricular systolic pressure from tricuspid regurgitation is 38 mmHg.  · There is no evidence of pericardial effusion      DISCUSSION/SUMMARY  61-year-old female with a medical history of morbid obesity, end-stage renal disease on hemodialysis, immobility, metastatic breast cancer, who presents to me for evaluation of severe  bicuspid aortic valve stenosis.  She does have dyspnea on exertion, however it is tough to differentiate the cause of her dyspnea from her immobility and deconditioning along with obesity or her aortic valve stenosis.  That being said she clearly has severe aortic valve stenosis and I do think that assuming that her outlook from a metastatic breast cancer standpoint is acceptable she should move forward with valve replaced    Her morbid obesity is a limitation here and I think may significantly complicate transcatheter aortic valve replacement as far as access and hemostasis.  She does need a heart catheterization and I will prep her wrist and groin at that time and try to assess the feasibility of femoral     1.  Bicuspid aortic valve stenosis: Severe  - Echocardiogram has been reviewed.  He has severe aortic valve stenosis in the setting of normal ejection fraction  - She does have dyspnea in the setting of her severe aortic valve stenosis and morbid obesity  -I think it is very unlikely she would be a surgical candidate but I will refer her to the cardiac surgery team and set her up for a heart catheterization.    2.  End-stage renal disease on hemodialysis: Continue to defer to nephrology    3.  Chronic heart failure with preserved ejection fraction: The report association class II symptoms.  Volume management with hemodialysis

## 2022-08-18 NOTE — PROGRESS NOTES
Chief Complaint  Metastatic lobular breast cancer (ER/MD positive, HER-2/tj negative), anemia secondary to CKD3, CHF, peripheral neuropathy, chemotherapy related nausea chemotherapy-induced myelosuppression    Subjective        History of Present Illness  The patient returns today in follow-up due for Faslodex today and continuing on Verzenio.  She was on vacation last week in [place].  She states that she did have a fall 1 day in a restaurant where she was trying to get out of her scooter and could tell that her legs were quite weak and she fell.  She previously had low calcium and was taking Tums daily and states she has been doing so this past week and feels that her legs are stronger now.  She does feel like her hemoglobin has dropped and is in need of a transfusion.  She denies any known bleeding or dark stools.  She did see Dr. Casey 2 days ago.  They discussed the need for EGD and colonoscopy would be based on how aggressive she would like to be.  The plan was to first see the plan with Dr. Coyle  and she does see him later today.  She also states she will start doing home dialysis will begin training for this soon.    Objective   Vital Signs:   There were no vitals taken for this visit.    Physical Exam  Constitutional:       Appearance: She is well-developed. She is obese.      Comments: Patient is in a motorized scooter today   Eyes:      Extraocular Movements: Extraocular movements intact.      Conjunctiva/sclera: Conjunctivae normal.   Neck:      Thyroid: No thyromegaly.   Cardiovascular:      Rate and Rhythm: Normal rate and regular rhythm.      Heart sounds: Murmur heard.     No friction rub. No gallop.      Comments: 2/6 murmur  Pulmonary:      Effort: No respiratory distress.      Breath sounds: Normal breath sounds.      Comments: Dialysis access in place in the right subclavian position  Abdominal:      General: Bowel sounds are normal. There is no distension.      Palpations: Abdomen is soft.       Tenderness: There is no abdominal tenderness.   Musculoskeletal:         General: Swelling present.      Comments: 1+ bilateral lower extremity edema with venous stasis changes noted. Seated in scooter today.   Skin:     General: Skin is warm and dry.      Findings: No bruising or rash.   Neurological:      Mental Status: She is alert and oriented to person, place, and time.   Psychiatric:         Behavior: Behavior normal.        I have reexamined the patient and the results are consistent with the previously documented exam. BETY Anderson     Result Review : CBC and CMP today                     Assessment and Plan    *Metastatic lobular breast cancer (ER/SD positive, HER-2/tj negative):  · Previous stage IIIC right breast cancer in 2003, received neoadjuvant chemotherapy (unknown regimen) with subsequent bilateral mastectomies 1/22/2004 with right axillary dissection.  Residual 10-12 cm invasive lobular carcinoma on the right breast with 14/16+ nodes with extranodal extension.  Received adjuvant carboplatin and Navelbine chemotherapy x4 cycles  · Attempted adjuvant radiation to the chest wall however interrupted due to cellulitis that required removal of implants in August 2004  · Received tamoxifen from 6/22/2004 until June 2009.  Transitioned to Femara and received until June 2014  · Identification of CT findings concerning for carcinomatosis on CT scans and June 2019.  · PET scan confirmed hypermetabolic activity in the omentum as well as small retroperitoneal lymph nodes.  · CT-guided omental biopsy 7/30/2019 with metastatic lobular carcinoma of breast primary, ER positive (greater than 95%), SD positive (90%), HER-2/tj negative (1+ IHC)  · [facility] liquid biopsy 2/5/2020: MSI undetermined, mutations in BETH, NF1, CHEK2.  No evidence of PIK3CA mutation.  · Subsequent [facility] germline testing 11/12/2021 with BETH VUS (c.2864C>A) and POLE VUS (c.631A>T)  · Initiation of Aromasin and  Ibrance in August 2019  · Difficulty with myelosuppression related to Ibrance requiring dose alterations, eventually changed to 100 mg for 7 days on followed by 7 days off.  · CT chest abdomen pelvis 10/26/2021 with increase in left hydronephrosis with increase in left perinephric and periureteral stranding. Decrease in stone in the left kidney lower pole (7 mm).  Stable small retroperitoneal lymph and left periaortic lymph nodes.  · PET scan 11/10/2021 with multiple bilateral hypermetabolic nodular pulmonary lesions, asymmetric moderate to severe left hydronephrosis with ill-defined fat stranding and soft tissue thickening in the renal sinus and surrounding the left ureter, hypermetabolic left retroperitoneal lymph node with slight increase in size, ill-defined hypermetabolic thickening in the inferior omentum with increase in size, uptake and C7 (indeterminate, recommended MRI).  Short segments of uptake in the mid and distal esophagus possibly related to gastritis.  · PET scan findings felt to be consistent with progressive malignancy.  Patient declined repeat omental biopsy.   · Options for further treatment discussed on 11/12/2021.  In light of renal failure and dialysis, options are more limited.  Recommendation to continue Ibrance and discontinue Aromasin, begin Faslodex.  Discussed possible future use of single agent Taxol.    · Aromasin discontinued 11/12/2021  · On 11/22/2021 initiation of Faslodex with continuation of Ibrance (100 mg daily for 7 days on followed by 7 days off).  · MRI cervical spine 11/18/2021 showed the right C7 normality to likely represent metastatic disease.  With solitary bone lesion, did not recommend pursuit of bone modifying agent.  · Following 2 cycles Faslodex/Ibrance, PET scan on 1/11/2022 showed no change in the soft tissue superior to the dome of the bladder with hypermetabolic activity (likely related to carcinomatosis).  Significant decrease in injection of fat around the  left renal pelvis and stable retroperitoneal hypermetabolic lymph nodes, decrease in size and activity in small bilateral pulmonary nodules.  Findings felt to represent evidence of response to treatment.    · Ibrance held briefly beginning 1/28/2022 due to hospitalization with COVID-19 infection and C. difficile colitis.  Patient developed leukopenia/neutropenia.  Ibrance resumed at previous dosing (100 mg daily 7 days on followed by 7 days off) on 2/9/22.  · Following 5 cycles Faslodex/Ibrance PET scan 4/19/2022 with evidence of mixed response.  New hypermetabolic lesion spinous process L2 (SUV 5.1) and slight increase in activity left clavicular metastasis (SUV increased 4.6-8.1).  C7 hypermetabolic mixed lytic and blastic bone lesion was unchanged.  Decrease in uptake involving omental thickening.  Stable soft tissue thickening around the left renal pelvis and ureter.  Discussion again regarding potential pursuit of IV chemotherapy with Taxol versus continuation of Ibrance and Faslodex with radiation to sites of bony disease at L2, left clavicle, C7.  Patient opted to defer initiation of systemic chemotherapy and continue Ibrance/Faslodex with consideration of radiation.  · Initiated monthly Xgeva on 5/19/2022 (cleared with nephrology).  Xgeva subsequently held due to significant hypocalcemia.  Decision made to forego further Xgeva with persistent hypocalcemia.  · Palliative radiation received to lumbar spine regarding L2 metastasis 5/23- 6/7/2022  · Ibrance held during radiation therapy beginning 5/22/2022.  Ibrance resumed 6/16/2022.  · PET scan 7/21/2022 with stable hypermetabolic abdominal pelvic lymph nodes and subcarinal lymph node, stable bone lesions at C7, left clavicle.  Stable soft tissue thickening around the left renal pelvis with left hydronephrosis.  Uptake in adrenal glands felt to be likely reactive (no change in size).  No activity noted at L2 (previously radiated).  New hypermetabolic lesion  right anterior sixth rib.  · With evidence of further slight progression in bone on PET scan (new right sixth rib lesion), on 7/28/2022 discontinued Ibrance and plan to transition to abemaciclib with continuation of Faslodex.  Plan to begin abemaciclib at reduced dose 50 mg twice daily on 8/4/2022.  Consider subsequent dose escalation if tolerated.  · 8/4/2022 patient is reviewed back today actually having started Verzenio 50 mg twice daily as of yesterday.  Hemoglobin has improved up to 8.1 after transfusion last week.  We reviewed again side effects for which to monitor with Verzenio.  She denies other concerns at this time and will return as scheduled.  · 8/18/2022 patient returns today for follow-up continuing on Verzenio 50 mg twice daily and due for Faslodex today.  She did have a delay in the dosing of Faslodex secondary to vacation.  She has had some increased weakness and had a fall while on vacation though went down to her knees and did not injure herself she reports.  She does feel that she needs a transfusion and her hemoglobin is down today to 6.8.  She does see cardiology later today.  Otherwise she denies any new concerns.    *Anemia secondary to ESRD, exacerbated by Ibrance:  · Anemia secondary to ESRD, malignancy with exacerbation by use of Ibrance  · Previous evidence of folate deficiency 8/12/2019 with level 3.84, initiated folic acid 1 mg daily  · Previous evidence of iron deficiency, received Injectafer on 8/7/2020 750 mg IV x1 dose.  Second dose not administered due to onset of fever, subsequent iron studies precluded further administration of IV iron.  · Previous low normal B12 level initiated oral B12 1000 mcg daily.  · Patient had previously received Procrit and required intermittent transfusion support  · Following initiation of hemodialysis, management of BEAU transitioned to nephrology, receiving Epogen with dialysis.  · Ongoing transfusion support prompted alteration in Ibrance dosing on  8/23/2021.  · After alteration in Ibrance dosing, hemoglobin improved and remained stable in the 9-10 range.  · Improved but ongoing intermittent need for transfusion support despite Ibrance dose alteration  · Stool negative for occult blood x3 on 11/2/2021  · Patient with reduced dialysis frequency to 2 days/week with concurrent decrease in Epogen dosing corresponding again to increased transfusion requirement.  · Epogen dose increased per nephrology to 20,000 units twice weekly with dialysis on Mondays and Fridays  · Ibrance again exacerbating anemia with ongoing intermittent transfusion requirements  · Additional transfusions required with hemoglobin on 3/31/2022 6.6, hemoglobin 4/21/2022 of 6.4, hemoglobin on 5/5/2022 of 6.8, hemoglobin 6.4 on 7/14/2022, hemoglobin 6.4 on 7/28/2022.  · Hemoglobin improved to 8.1 today.  We did have her collect stool cards which returned positive for blood.  She has been referred to GI and is scheduled to see them in 2 weeks.  She is continuing on Epogen 20,000 units Mondays and Fridays with dialysis as well as IV iron on Mondays.    · 8/18/2022 hemoglobin today 6.8 we will proceed with a 2 unit PRBC transfusion.  She denies any known bleeding.    *ESRD on HD:  · Patient with previous CKD3/4  · Hospitalization 4/29-5/4/2021 with RYAN/CKD, UTI, anemia.  Required initiation of hemodialysis Tuesday Thursday Saturday.   · Decrease in frequency of dialysis to twice per week.  She continues to produce a significant amount of urine.   · Status post left upper extremity AV fistula placement on 11/3/2021 by vascular surgery  · Attempt to hold further dialysis on 1/11/2022 with close monitoring of her laboratory and clinical parameters.  Dialysis quickly resumed due to development of hyperkalemia.  · Patient continues on dialysis 2 days/week on Mondays and Fridays.  Per patient report potential future consideration to hold dialysis again in the future.  Patient is followed closely by   [facility].    · Creatinine from today resulted after the patient left the office at 7.35 which is higher than her mid 4 range typically.  Mirella Salazar RN to contact nephrology with the lab results.  Copy of labs sent to nephrology and dialysis center.  Voicemail left for patient notifying her of the elevated lab.  Potassium today 5.1.    *CHF with severe aortic stenosis:  · Echocardiogram 7/12/2019 showing ejection fraction 48% with moderate dilation of the LV cavity, global hypokinesis, grade 2 diastolic dysfunction, mild to moderate aortic stenosis, trivial pericardial effusion.  · Echocardiogram 7/19/2021 with ejection fraction 56%, left atrial volume moderately increased, grade 2 diastolic dysfunction, severe calcification aortic valve, moderate aortic stenosis, severe mitral calcification, mild mitral stenosis, marked elevation in RVSP from tricuspid regurgitation.  · Echocardiogram on 7/13/2022 with ejection fraction 56-60%, grade 2 diastolic dysfunction, severe aortic stenosis.    · She will see Dr. Coyle this afternoon to discuss recommendations for intervention in regards to her aortic valve whether that be with open heart surgery or catheter-based approach.  Open heart surgery would likely be high risk given her metastatic malignancy, renal failure, obesity.    *Left upper and bilateral lower extremity peripheral neuropathy:  · Patient reports that left upper extremity neuropathy was not present from previous chemotherapy but occurred after hospitalization that required intubation and critical care stay.  Apparently felt to represent critical care neuropathy.  Improved over time.  · Bilateral lower extremity neuropathy felt to be related to diabetes  · May have future implications regarding treatment for breast cancer.  · Patient reports that peripheral neuropathy symptoms continue in the bilateral lower extremities, unchanged.  This will be a consideration with potential future use of  Taxol    *Uterine/endometrial activity on PET scan:  · Patient experienced postmenopausal vaginal bleeding in 2013, negative endometrial biopsy and gynecologic evaluation.  · With findings on PET scan with enlarging uterus and some hypermetabolic activity, referred back to Dr. Oliveros in gynecology.    · 9/19/2019 D&C with hysteroscopy by Dr. Oliveros, no significant intraoperative findings, pathologic results with benign findings, no evidence of malignancy.    *Nausea:  · Mild, relieved with Zofran.   · Patient experienced improvement in nausea after initiating hemodialysis  · No recent nausea    *Myelosuppression secondary to Ibrance:  · WBC during hospitalization 1/28-1/30/2022 declined into the 1.9-2.0 range due to concurrent COVID-19 infection.  Ibrance was held briefly.  · Ibrance resumed on 2/9/2022 with WBC up to 4.49, ANC 3.07  · Ibrance discontinued in July 2022.  Patient transitioned to Verzenio.  Continue to monitor counts.  · 8/18/2022 WBC 4.77 and ANC 3.15    *Depression  · Patient with history of depression, worsened after the death of her son in November 2021  · With evidence of progressive malignancy in November 2021, patient agreeable to referral to supportive oncology  · Patient was seen in supportive oncology 11/18/2021, Zoloft increased from 50 up to 100 mg daily.  Prescribed armodafinil 50 mg daily however there or issues with insurance coverage  · Patient does continue with difficulties regarding depression that wax and wane    *Left perinephric stranding secondary to malignancy with hydronephrosis:  · CT 4/22/2021 showed interval enlargement of 2 staghorn calculi in the left kidney with persistent left perinephric stranding  · Hospitalization 4/29-5/4/2021 with RYAN/CKD, UTI, anemia.  Required 2 unit PRBC transfusion and initiated hemodialysis Tuesday Thursday Saturday.    · Discussion from urology regarding potential need for surgical procedure to address staghorn calculus left kidney  · CT scan  7/2/2021 with only 1 remaining left renal stone identified which had decreased in size.  Patient appears to have passed the other stone.  · As above, CT 10/26/2021 with more prominent left hydronephrosis and increase in left perinephric and periureteral stranding.  Felt to possibly be related to passage of recent stone versus partial obstruction secondary to debris or thrombus in the left ureter versus pyelonephritis.  Patient however with no clinical evidence of recent stone passage nor pyelonephritis. Malignancy felt to be the cause of CT changes around the left kidney at this point.   · Left ureteral stent replacement 12/16/2021  · PET scan 1/11/2022 with decrease in fat injection near left renal pelvis, stent in satisfactory position.  Mild residual hydronephrosis on the left.  · Ureteral stent replaced on 3/10/2022  · PET scan 4/19/2022 with no hydronephrosis, left ureteral stent in place  · PET scan 7/21/2022 with persistent left hydronephrosis, ureteral stent in place    *Right thyroid nodules  · Patient underwent prior thyroid biopsy by her report with benign findings many years ago, records not available  · On MRI cervical spine 11/18/2021, finding of 1.8 cm right thyroid nodule  · Thyroid ultrasound 11/26/2021 with right-sided thyroid nodules, 1 nodule was hypoechoic, solid measuring 2 cm with biopsy recommended.  · Thyroid ultrasound results reviewed with patient.  In light of her metastatic breast cancer, renal failure the significance of the thyroid nodule is felt to be much less.  We discussed possibility of thyroid biopsy however patient is inclined to forego this, especially since she has had a biopsy performed in the past with benign findings by her report.    · No hypermetabolic activity identified on PET scan 1/11/2022 in the neck    *Hospitalization 1/28-1/30/2022 due to COVID-19 infection and C. difficile colitis  · Patient fully vaccinated with 3 doses Moderna COVID-19 vaccine  · Patient  developed symptoms 1/26/2022 with abrupt onset sinus congestion, mild cough, subsequently developed nausea and diarrhea on 1/27/2022.  Significant fatigue.  · Patient tested positive in emergency department on 1/28/2022 and was admitted  · Patient maintained O2 saturation in mid 90% range on room air  · Patient received remdesivir  · Patient was also found to be positive for C. difficile colitis, received course of oral vancomycin.  · Symptoms from both COVID-19 and C. difficile colitis ultimately resolved.    *Hypocalcemia  · Patient developed significant hypocalcemia after receiving Xgeva on 5/19/2022  · Calcium management per nephrology  · No further Xgeva planned due to persistent significant hypocalcemia, calcium 8.7 today.      Plan:  1. Faslodex today   2. continue Verzenio 50 mg twice daily  3. Nephrology contacted regarding elevated creatinine, voicemail left for patient regarding lab level.  Copy of labs sent to nephrology and dialysis.  4. Continue oral folic acid 1 mg daily, B12 1000 mcg daily.  5. The patient is continuing on hemodialysis every Monday and Friday and is receiving Epogen 20,000 units with each dialysis per nephrology.  Patient is also receiving IV iron every Monday with dialysis.  6. Patient is following up later today with cardiology to discuss options for management of her aortic stenosis.  7. MD visit in 1 week with CBC, CMP.  We will assess tolerance of abemaciclib and consider whether dose increase could be performed.    Patient continues on high risk medication requiring intensive monitoring.         signed out to kris

## 2022-08-18 NOTE — H&P (VIEW-ONLY)
Juana Hall  1960  Date of Office Visit: 08/18/22  Encounter Provider: Luis Rivers MD  Place of Service: [facility] CARDIOLOGY      CHIEF COMPLAINT:  Bicuspid aortic valve  Aortic valve stenosis  Breast cancer: Metastatic  Obesity  End-stage renal disease on hemodialysis  Chronic heart failure with preserved ejection fraction  Chronic normocytic anemia      HISTORY OF PRESENT ILLNESS:   61-year-old female with a medical history of metastatic breast cancer, end-stage renal disease on hemodialysis, normocytic anemia, obesity, immobility, chronic normocytic anemia, and bicuspid aortic valve with severe stenosis who presents to me for evaluation of the severe aortic valve stenosis.  She states that she does not do much walking but when she does she has dyspnea on exertion.  She denies any chest pain or syncope.  She does not appear to be dyspneic at rest.  She wears a CPAP at night and does not feel like she has orthopnea symptoms while wearing that.  Her blood pressure and heart rate are well controlled.  She continues to follow with hematology and oncology team and has intermittent blood transfusions.  She did undergo a transthoracic echocardiogram in July 2022 with severe degenerative aortic valve stenosis and a mean gradient of 45 mmHg across her bicuspid aortic valve.    Review of Systems   Constitutional: Negative for fever and malaise/fatigue.   HENT: Negative for nosebleeds and sore throat.    Eyes: Negative for blurred vision and double vision.   Cardiovascular: Negative for chest pain, claudication, palpitations and syncope.   Respiratory: Negative for cough, shortness of breath and snoring.    Endocrine: Negative for cold intolerance, heat intolerance and polydipsia.   Skin: Negative for itching, poor wound healing and rash.   Musculoskeletal: Negative for joint pain, joint swelling, muscle weakness and myalgias.   Gastrointestinal: Negative for abdominal  pain, melena, nausea and vomiting.   Neurological: Negative for light-headedness, loss of balance, seizures, vertigo and weakness.   Psychiatric/Behavioral: Negative for altered mental status and depression.          Past Medical History:   Diagnosis Date   • Anemia    • Anxiety and depression    • Bicuspid aortic valve    • Breast cancer (HCC) 2004    RIGHT, HAD NEELA. MASTECTOMY, CHEMO AND RADIATION   • CHF (congestive heart failure) (HCC)    • CKD (chronic kidney disease)    • Diabetes mellitus (HCC)    • Dialysis patient (HCC)     TUES AND SATURDAY DAVITA HAY   • Elevated cholesterol    • Frequent UTI    • Heart murmur    • History of kidney stones    • History of MRSA infection 2005    POST BREAST SURGERY-TREATED [facility]   • History of sepsis 2010    KIDNEY STONE   • History of transfusion    • Hyperlipidemia    • Hypertension    • Hyperthyroidism    • Hypothyroidism    • Kidney stone     CURRENT LEFT-DEC 2021   • Lymphedema of leg     LEFT   • Metastasis from breast cancer (HCC)     STOMACH-OMENTUM   • Neuromuscular disorder (HCC)    • Obesity    • ANDREW on CPAP     CPAP   • Osteoarthrosis    • Peripheral neuropathy     FEET BILAT   • Radiculopathy    • Rectal bleeding 8/16/2022   • Thickened endometrium    • Weakness     BILAT LEGS-WITH ANY AMT OF TIME       The following portions of the patient's history were reviewed and updated as appropriate: Social history , Family history and Surgical history     Current Outpatient Medications on File Prior to Visit   Medication Sig Dispense Refill   • Abemaciclib 50 MG tablet Take 1 tablet by mouth 2 (Two) Times a Day. 60 tablet 2   • acetaminophen (TYLENOL) 500 MG tablet Take 500 mg by mouth As Needed.     • amLODIPine (NORVASC) 5 MG tablet Take 1 tablet by mouth Daily. 90 tablet 3   • Armodafinil 250 MG tablet Take 1 tablet by mouth Daily. 30 tablet 2   • aspirin 81 MG EC tablet Take 81 mg by mouth Daily. HELD FOR SURGERY     • atorvastatin (LIPITOR) 40 MG tablet Take 1  "tablet by mouth Every Night. 90 tablet 1   • carvedilol (COREG) 3.125 MG tablet Take 1 tablet by mouth 2 (Two) Times a Day. 180 tablet 3   • folic acid (FOLVITE) 1 MG tablet TAKE ONE TABLET BY MOUTH DAILY (Patient taking differently: Take 1 mg by mouth Daily.) 30 tablet 2   • folic acid (FOLVITE) 1 MG tablet Take 1 tablet by mouth Daily. 30 tablet 3   • gabapentin (Neurontin) 300 MG capsule Take 1 capsule by mouth Every Night. 30 capsule 1   • Levemir FlexTouch 100 UNIT/ML injection Inject 40 Units under the skin into the appropriate area as directed Daily. 13 pen 1   • levothyroxine (SYNTHROID, LEVOTHROID) 50 MCG tablet Take 1 tablet by mouth Daily. 90 tablet 1   • ondansetron (Zofran) 8 MG tablet Take 1 tablet by mouth Every 8 (Eight) Hours As Needed for Nausea or Vomiting. 30 tablet 2   • QUEtiapine (SEROquel) 100 MG tablet Take 1 tablet by mouth Every Night. 90 tablet 0   • sertraline (ZOLOFT) 100 MG tablet Take 1.5 tablets by mouth Daily. 135 tablet 0   • vitamin B-12 (CYANOCOBALAMIN) 1000 MCG tablet Take 1,000 mcg by mouth Daily.     • vitamin D (ERGOCALCIFEROL) 18734 units capsule capsule Take 50,000 Units by mouth Every 7 (Seven) Days. WEDNESDAY       No current facility-administered medications on file prior to visit.       No Known Allergies    Vitals:    08/18/22 1306   Weight: (!) 163 kg (360 lb)   Height: 170.2 cm (67\")     Body mass index is 56.38 kg/m².   Constitutional:       Appearance: Well-developed.      Comments: Obese.  In wheelchair.   Eyes:      General: No scleral icterus.     Conjunctiva/sclera: Conjunctivae normal.   HENT:      Head: Normocephalic and atraumatic.   Neck:      Thyroid: No thyromegaly.      Vascular: Normal carotid pulses. No carotid bruit, hepatojugular reflux or JVD.      Trachea: No tracheal deviation.   Pulmonary:      Effort: No respiratory distress.      Breath sounds: Normal breath sounds. No decreased breath sounds. No wheezing. No rhonchi. No rales.   Chest:      " Chest wall: Not tender to palpatation.   Cardiovascular:      Normal rate. Regular rhythm.      No gallop.   Pulses:     Carotid: 2+ bilaterally.     Radial: 2+ bilaterally.     Femoral: 2+ bilaterally.     Dorsalis pedis: 2+ bilaterally.     Posterior tibial: 2+ bilaterally.  Edema:     Peripheral edema absent.   Abdominal:      General: Bowel sounds are normal. There is no distension.      Palpations: Abdomen is soft.      Tenderness: There is no abdominal tenderness.   Musculoskeletal:         General: No deformity.      Cervical back: Normal range of motion and neck supple. Skin:     Findings: No erythema or rash.   Neurological:      Mental Status: Alert and oriented to person, place, and time.      Sensory: No sensory deficit.   Psychiatric:         Behavior: Behavior normal.            Lab Results   Component Value Date    WBC 4.77 08/18/2022    HGB 6.8 (C) 08/18/2022    HCT 22.7 (L) 08/18/2022    .2 (H) 08/18/2022     08/18/2022       Lab Results   Component Value Date    GLUCOSE 161 (H) 08/18/2022    BUN 71 (H) 08/18/2022    CREATININE 7.35 (C) 08/18/2022    EGFRIFNONA 11 (L) 02/16/2022    EGFRIFAFRI  01/29/2022      Comment:      <15 Indicative of kidney failure.    BCR 9.7 08/18/2022    K 5.1 (H) 08/18/2022    CO2 20.2 (L) 08/18/2022    CALCIUM 8.7 08/18/2022    PROTENTOTREF 7.0 03/16/2019    ALBUMIN 3.30 (L) 08/18/2022    LABIL2 1.2 03/16/2019    AST 8 08/18/2022    ALT 9 08/18/2022       Lab Results   Component Value Date    GLUCOSE 161 (H) 08/18/2022    CALCIUM 8.7 08/18/2022     08/18/2022    K 5.1 (H) 08/18/2022    CO2 20.2 (L) 08/18/2022     08/18/2022    BUN 71 (H) 08/18/2022    CREATININE 7.35 (C) 08/18/2022    EGFRIFAFRI  01/29/2022      Comment:      <15 Indicative of kidney failure.    EGFRIFNONA 11 (L) 02/16/2022    BCR 9.7 08/18/2022    ANIONGAP 15.8 (H) 08/18/2022       Lab Results   Component Value Date    CHLPL 122 02/16/2022    TRIG 185 (H) 02/16/2022    HDL 37  (L) 02/16/2022    LDL 54 02/16/2022         ECG 12 Lead    Date/Time: 8/18/2022 1:55 PM  Performed by: uLis Rivers MD  Authorized by: Luis Rivers MD   Comparison: compared with previous ECG from 1/28/2022  Similar to previous ECG  Rhythm: sinus rhythm  Rate: normal  Conduction: 1st degree AV block  QRS axis: normal    Clinical impression: non-specific ECG  Comments: Nonspec. t abnormalities lateral leads             Results for orders placed during the hospital encounter of 07/13/22    Adult Transthoracic Echo Complete w/ Color, Spectral and Contrast if Necessary Per Protocol    Interpretation Summary  · Left ventricular ejection fraction appears to be 56 - 60%. Left ventricular systolic function is normal.  · Left ventricular wall thickness is consistent with mild concentric hypertrophy  · Left ventricular diastolic function is consistent with (grade II w/high LAP) pseudonormalization.  · Normal right ventricular cavity size and systolic function noted.  · The left atrial cavity is mildly dilated.  · Severe aortic valve stenosis is present.  · Aortic valve area is 0.84 cm2. Peak velocity of the flow distal to the aortic valve is 449.3 cm/s. Aortic valve maximum pressure gradient is 80.8 mmHg. Aortic valve mean pressure gradient is 45.9 mmHg  · There is severe mitral annular calcification the mitral valve leaflets are thickened and appear to be restricted in motion  · Moderate mitral valve stenosis is present. The mitral valve peak gradient is 16.2 mmHg. The mitral valve mean gradient is 6.2 mmHg.  · Mild tricuspid valve regurgitation is present.  · Calculated right ventricular systolic pressure from tricuspid regurgitation is 38 mmHg.  · There is no evidence of pericardial effusion      DISCUSSION/SUMMARY  61-year-old female with a medical history of morbid obesity, end-stage renal disease on hemodialysis, immobility, metastatic breast cancer, who presents to me for evaluation of severe  bicuspid aortic valve stenosis.  She does have dyspnea on exertion, however it is tough to differentiate the cause of her dyspnea from her immobility and deconditioning along with obesity or her aortic valve stenosis.  That being said she clearly has severe aortic valve stenosis and I do think that assuming that her outlook from a metastatic breast cancer standpoint is acceptable she should move forward with valve replaced    Her morbid obesity is a limitation here and I think may significantly complicate transcatheter aortic valve replacement as far as access and hemostasis.  She does need a heart catheterization and I will prep her wrist and groin at that time and try to assess the feasibility of femoral     1.  Bicuspid aortic valve stenosis: Severe  - Echocardiogram has been reviewed.  He has severe aortic valve stenosis in the setting of normal ejection fraction  - She does have dyspnea in the setting of her severe aortic valve stenosis and morbid obesity  -I think it is very unlikely she would be a surgical candidate but I will refer her to the cardiac surgery team and set her up for a heart catheterization.    2.  End-stage renal disease on hemodialysis: Continue to defer to nephrology    3.  Chronic heart failure with preserved ejection fraction: The report association class II symptoms.  Volume management with hemodialysis

## 2022-08-18 NOTE — PROGRESS NOTES
Was asked to contact patient's nephrologist Dr. Tai Antonio to notify him of patient's critical creatinine level at today's office visit. Called Dr. Antonio's office, and was told that I would need to contact patient's dialysis center ([facility] in [place]) with results since she is on dialysis, and that they would put a page out to Dr. Annabelle Antonio. Was given a toll-free phone number to contact dialysis center, however the number rang to a [facility] call center. Called [facility]'s toll-free number listed on their website and was able to get the direct phone number for [facility]. Phone rang multiple times before going to voice mail. Left message indicating the need to relay critical lab values to on call doctor, and asked for a call back. Made several more unsuccessful attempts to call dialysis center. Ultimately decided to route CMP results to Dr. Tai Atnonio's in basket and also faxed results to [facility].

## 2022-08-20 ENCOUNTER — LAB (OUTPATIENT)
Dept: LAB | Facility: HOSPITAL | Age: 62
End: 2022-08-20

## 2022-08-20 DIAGNOSIS — Z01.818 OTHER SPECIFIED PRE-OPERATIVE EXAMINATION: ICD-10-CM

## 2022-08-20 LAB — SARS-COV-2 ORF1AB RESP QL NAA+PROBE: NOT DETECTED

## 2022-08-20 PROCEDURE — U0004 COV-19 TEST NON-CDC HGH THRU: HCPCS

## 2022-08-20 PROCEDURE — C9803 HOPD COVID-19 SPEC COLLECT: HCPCS

## 2022-08-21 ENCOUNTER — INFUSION (OUTPATIENT)
Dept: ONCOLOGY | Facility: HOSPITAL | Age: 62
End: 2022-08-21

## 2022-08-21 VITALS
RESPIRATION RATE: 18 BRPM | HEART RATE: 64 BPM | DIASTOLIC BLOOD PRESSURE: 68 MMHG | OXYGEN SATURATION: 97 % | SYSTOLIC BLOOD PRESSURE: 136 MMHG | TEMPERATURE: 97.4 F

## 2022-08-21 DIAGNOSIS — N18.6 ANEMIA DUE TO CHRONIC KIDNEY DISEASE, ON CHRONIC DIALYSIS: ICD-10-CM

## 2022-08-21 DIAGNOSIS — D63.1 ANEMIA DUE TO CHRONIC KIDNEY DISEASE, ON CHRONIC DIALYSIS: ICD-10-CM

## 2022-08-21 DIAGNOSIS — Z99.2 ANEMIA DUE TO CHRONIC KIDNEY DISEASE, ON CHRONIC DIALYSIS: ICD-10-CM

## 2022-08-21 PROCEDURE — P9016 RBC LEUKOCYTES REDUCED: HCPCS

## 2022-08-21 PROCEDURE — 86900 BLOOD TYPING SEROLOGIC ABO: CPT

## 2022-08-21 PROCEDURE — 36430 TRANSFUSION BLD/BLD COMPNT: CPT

## 2022-08-21 RX ORDER — SODIUM CHLORIDE 9 MG/ML
250 INJECTION, SOLUTION INTRAVENOUS AS NEEDED
Status: DISCONTINUED | OUTPATIENT
Start: 2022-08-21 | End: 2022-08-21 | Stop reason: HOSPADM

## 2022-08-21 RX ORDER — FUROSEMIDE 10 MG/ML
40 INJECTION INTRAMUSCULAR; INTRAVENOUS ONCE
Status: DISCONTINUED | OUTPATIENT
Start: 2022-08-21 | End: 2022-08-21 | Stop reason: HOSPADM

## 2022-08-22 NOTE — PERIOPERATIVE NURSING NOTE
SPOKE W/ TATE NP ALERTING HER OF PT HBG 6.8, PT HAS HX ANEMIA, SPOKE W/ PT AND SHE RECEIVED 2 UNITS OF PRBC YESTERDAY - TATE STATES TO HAVE PT COME IN TODAY AND GET REPEAT HGB DRAWN, IF HGB 8.0 OR GREATER, LIKELY OK TO PROCEED WITH PROCEDURE.  TATE IS COMMUNICATING W/ DR DANIELS.  PT AWARE AND STATES SHE WILL BE ABLE TO COME AND GET LABS RECHECKED TODAY.

## 2022-08-22 NOTE — PERIOPERATIVE NURSING NOTE
TATE CALLED BACK STATING DR DANIELS SAID HE WAS NOT GOING TO CANCEL PROCEDURE REGARDLESS OF HBG LEVEL, PT CALLED BACK AND INSTRUCTED TO JUST COME IN AT 0630 AND WE WILL DRAW HER REPEAT HGB.  PT STATES UNDERSTANDING.

## 2022-08-23 ENCOUNTER — HOSPITAL ENCOUNTER (OUTPATIENT)
Facility: HOSPITAL | Age: 62
Setting detail: HOSPITAL OUTPATIENT SURGERY
Discharge: HOME | End: 2022-08-23
Attending: INTERNAL MEDICINE | Admitting: INTERNAL MEDICINE

## 2022-08-23 ENCOUNTER — DOCUMENTATION (OUTPATIENT)
Dept: CARDIOLOGY | Facility: HOSPITAL | Age: 62
End: 2022-08-23

## 2022-08-23 VITALS
HEART RATE: 68 BPM | OXYGEN SATURATION: 96 % | RESPIRATION RATE: 18 BRPM | BODY MASS INDEX: 45.99 KG/M2 | TEMPERATURE: 99.8 F | SYSTOLIC BLOOD PRESSURE: 152 MMHG | WEIGHT: 293 LBS | HEIGHT: 67 IN | DIASTOLIC BLOOD PRESSURE: 72 MMHG

## 2022-08-23 DIAGNOSIS — Q23.1 BICUSPID AORTIC VALVE: ICD-10-CM

## 2022-08-23 DIAGNOSIS — I35.0 AORTIC STENOSIS, SEVERE: Primary | ICD-10-CM

## 2022-08-23 DIAGNOSIS — I35.0 AORTIC STENOSIS, SEVERE: ICD-10-CM

## 2022-08-23 LAB
BASOPHILS # BLD AUTO: 0.04 10*3/MM3 (ref 0–0.2)
BASOPHILS NFR BLD AUTO: 0.8 % (ref 0–1.5)
DEPRECATED RDW RBC AUTO: 57.7 FL (ref 37–54)
EOSINOPHIL # BLD AUTO: 0.47 10*3/MM3 (ref 0–0.4)
EOSINOPHIL NFR BLD AUTO: 9.5 % (ref 0.3–6.2)
ERYTHROCYTE [DISTWIDTH] IN BLOOD BY AUTOMATED COUNT: 16.7 % (ref 12.3–15.4)
GLUCOSE BLDC GLUCOMTR-MCNC: 174 MG/DL (ref 70–130)
HCT VFR BLD AUTO: 25.7 % (ref 34–46.6)
HCT VFR BLDA CALC: 25 % (ref 38–51)
HGB BLD-MCNC: 8.2 G/DL (ref 12–15.9)
HGB BLDA-MCNC: 8.5 G/DL (ref 12–17)
IMM GRANULOCYTES # BLD AUTO: 0.05 10*3/MM3 (ref 0–0.05)
IMM GRANULOCYTES NFR BLD AUTO: 1 % (ref 0–0.5)
LYMPHOCYTES # BLD AUTO: 0.65 10*3/MM3 (ref 0.7–3.1)
LYMPHOCYTES NFR BLD AUTO: 13.1 % (ref 19.6–45.3)
MCH RBC QN AUTO: 30.4 PG (ref 26.6–33)
MCHC RBC AUTO-ENTMCNC: 31.9 G/DL (ref 31.5–35.7)
MCV RBC AUTO: 95.2 FL (ref 79–97)
MONOCYTES # BLD AUTO: 0.37 10*3/MM3 (ref 0.1–0.9)
MONOCYTES NFR BLD AUTO: 7.5 % (ref 5–12)
NEUTROPHILS NFR BLD AUTO: 3.38 10*3/MM3 (ref 1.7–7)
NEUTROPHILS NFR BLD AUTO: 68.1 % (ref 42.7–76)
NRBC BLD AUTO-RTO: 0.2 /100 WBC (ref 0–0.2)
PLATELET # BLD AUTO: 266 10*3/MM3 (ref 140–450)
PMV BLD AUTO: 8.5 FL (ref 6–12)
RBC # BLD AUTO: 2.7 10*6/MM3 (ref 3.77–5.28)
SAO2 % BLDA: 71 % (ref 95–98)
WBC NRBC COR # BLD: 4.96 10*3/MM3 (ref 3.4–10.8)

## 2022-08-23 PROCEDURE — C1894 INTRO/SHEATH, NON-LASER: HCPCS | Performed by: INTERNAL MEDICINE

## 2022-08-23 PROCEDURE — 85025 COMPLETE CBC W/AUTO DIFF WBC: CPT | Performed by: INTERNAL MEDICINE

## 2022-08-23 PROCEDURE — 25010000002 FENTANYL CITRATE (PF) 50 MCG/ML SOLUTION: Performed by: INTERNAL MEDICINE

## 2022-08-23 PROCEDURE — 99204 OFFICE O/P NEW MOD 45 MIN: CPT | Performed by: THORACIC SURGERY (CARDIOTHORACIC VASCULAR SURGERY)

## 2022-08-23 PROCEDURE — 0 IOPAMIDOL PER 1 ML: Performed by: INTERNAL MEDICINE

## 2022-08-23 PROCEDURE — 25010000002 MIDAZOLAM PER 1 MG: Performed by: INTERNAL MEDICINE

## 2022-08-23 PROCEDURE — 82962 GLUCOSE BLOOD TEST: CPT

## 2022-08-23 PROCEDURE — C1769 GUIDE WIRE: HCPCS | Performed by: INTERNAL MEDICINE

## 2022-08-23 PROCEDURE — 82810 BLOOD GASES O2 SAT ONLY: CPT

## 2022-08-23 PROCEDURE — 25010000002 HEPARIN (PORCINE) PER 1000 UNITS: Performed by: INTERNAL MEDICINE

## 2022-08-23 PROCEDURE — 93456 R HRT CORONARY ARTERY ANGIO: CPT | Performed by: INTERNAL MEDICINE

## 2022-08-23 PROCEDURE — 85014 HEMATOCRIT: CPT

## 2022-08-23 PROCEDURE — 85018 HEMOGLOBIN: CPT

## 2022-08-23 RX ORDER — SODIUM CHLORIDE 0.9 % (FLUSH) 0.9 %
10 SYRINGE (ML) INJECTION EVERY 12 HOURS SCHEDULED
Status: DISCONTINUED | OUTPATIENT
Start: 2022-08-23 | End: 2022-08-23 | Stop reason: HOSPADM

## 2022-08-23 RX ORDER — ONDANSETRON 4 MG/1
4 TABLET, FILM COATED ORAL EVERY 6 HOURS PRN
Status: DISCONTINUED | OUTPATIENT
Start: 2022-08-23 | End: 2022-08-23 | Stop reason: HOSPADM

## 2022-08-23 RX ORDER — FENTANYL CITRATE 50 UG/ML
INJECTION, SOLUTION INTRAMUSCULAR; INTRAVENOUS AS NEEDED
Status: DISCONTINUED | OUTPATIENT
Start: 2022-08-23 | End: 2022-08-23 | Stop reason: HOSPADM

## 2022-08-23 RX ORDER — SODIUM CHLORIDE 0.9 % (FLUSH) 0.9 %
10 SYRINGE (ML) INJECTION AS NEEDED
Status: DISCONTINUED | OUTPATIENT
Start: 2022-08-23 | End: 2022-08-23 | Stop reason: HOSPADM

## 2022-08-23 RX ORDER — ACETAMINOPHEN 325 MG/1
650 TABLET ORAL EVERY 4 HOURS PRN
Status: DISCONTINUED | OUTPATIENT
Start: 2022-08-23 | End: 2022-08-23 | Stop reason: HOSPADM

## 2022-08-23 RX ORDER — HYDROCODONE BITARTRATE AND ACETAMINOPHEN 5; 325 MG/1; MG/1
1 TABLET ORAL EVERY 4 HOURS PRN
Status: DISCONTINUED | OUTPATIENT
Start: 2022-08-23 | End: 2022-08-23 | Stop reason: HOSPADM

## 2022-08-23 RX ORDER — LIDOCAINE HYDROCHLORIDE 20 MG/ML
INJECTION, SOLUTION INFILTRATION; PERINEURAL AS NEEDED
Status: DISCONTINUED | OUTPATIENT
Start: 2022-08-23 | End: 2022-08-23 | Stop reason: HOSPADM

## 2022-08-23 RX ORDER — ONDANSETRON 2 MG/ML
4 INJECTION INTRAMUSCULAR; INTRAVENOUS EVERY 6 HOURS PRN
Status: DISCONTINUED | OUTPATIENT
Start: 2022-08-23 | End: 2022-08-23 | Stop reason: HOSPADM

## 2022-08-23 RX ORDER — LIDOCAINE HYDROCHLORIDE 10 MG/ML
0.1 INJECTION, SOLUTION EPIDURAL; INFILTRATION; INTRACAUDAL; PERINEURAL ONCE AS NEEDED
Status: DISCONTINUED | OUTPATIENT
Start: 2022-08-23 | End: 2022-08-23 | Stop reason: HOSPADM

## 2022-08-23 RX ORDER — MIDAZOLAM HYDROCHLORIDE 1 MG/ML
INJECTION INTRAMUSCULAR; INTRAVENOUS AS NEEDED
Status: DISCONTINUED | OUTPATIENT
Start: 2022-08-23 | End: 2022-08-23 | Stop reason: HOSPADM

## 2022-08-23 RX ORDER — SODIUM CHLORIDE 9 MG/ML
75 INJECTION, SOLUTION INTRAVENOUS CONTINUOUS
Status: DISCONTINUED | OUTPATIENT
Start: 2022-08-23 | End: 2022-08-23 | Stop reason: HOSPADM

## 2022-08-23 RX ADMIN — SODIUM CHLORIDE 75 ML/HR: 9 INJECTION, SOLUTION INTRAVENOUS at 07:18

## 2022-08-23 NOTE — CONSULTS
Patient Care Team:  Kiana Gramajo APRN as PCP - General (Family Medicine)  Pasha Harkins MD as Referring Physician (Cardiology)  Leodan Irvin Jr., MD as Consulting Physician (Hematology and Oncology)  Tai Antonio MD as Consulting Physician (Nephrology)    Chief complaint: Severe aortic valve stenosis     Subjective     History of Present Illness    Patient is a 61 year old female with a PMH of ESRD on HD, metastatic breast cancer, obesity, immobility, CHF, HTN, DM II, ANDREW with CPAP at night, history of MRSA infection, chronic normocytic anemia (follwed by hematology/oncology -- occassional transfusions), and bicuspid aortic valve with severe aortic stenosis. Patient presented to outpatient Cardiology on 8/18/22 for evaluation of severe aortic valve stenosis, which was found on TTE performed on 7/13/22 (mean gradient of 45 mmHg). Patient reported dyspnea with little exertion. Patient denied chest pain, dyspnea at rest, or syncope. On 8/23/22, cardiac cath was performed by Dr. Rivers and patient was found to have normal coronary arteries. Cardiac surgery was consulted for evaluation of patient and recommendation regarding intervention for severe aortic valve stenosis.       Review of Systems   Constitutional: Positive for activity change and fatigue. Negative for appetite change, chills, diaphoresis, fever and unexpected weight change.   HENT: Negative for congestion, dental problem, hearing loss, mouth sores, nosebleeds, postnasal drip, rhinorrhea, sneezing, sore throat, tinnitus, trouble swallowing and voice change.    Eyes: Negative for photophobia, pain, discharge, redness, itching and visual disturbance.   Respiratory: Positive for apnea and shortness of breath. Negative for cough, choking, chest tightness, wheezing and stridor.         Nocturnal CPAP.   Cardiovascular: Positive for leg swelling. Negative for chest pain and palpitations.   Gastrointestinal: Negative for abdominal  distention, abdominal pain, constipation, diarrhea, nausea and vomiting.   Endocrine: Negative for cold intolerance, heat intolerance and polyuria.   Genitourinary: Negative for difficulty urinating, dysuria, flank pain, frequency, hematuria and urgency.   Musculoskeletal: Positive for arthralgias, back pain, gait problem, joint swelling, myalgias and neck stiffness. Negative for neck pain.   Skin: Negative for color change, pallor, rash and wound.   Allergic/Immunologic: Positive for immunocompromised state. Negative for environmental allergies and food allergies.   Neurological: Negative for dizziness, tremors, seizures, syncope, facial asymmetry, speech difficulty, weakness, light-headedness, numbness and headaches.   Hematological: Negative for adenopathy. Does not bruise/bleed easily.   Psychiatric/Behavioral: Positive for sleep disturbance. Negative for agitation, behavioral problems, confusion, decreased concentration, dysphoric mood, hallucinations, self-injury and suicidal ideas. The patient is nervous/anxious. The patient is not hyperactive.    All other systems reviewed and are negative.       Past Medical History:   Diagnosis Date   • Anemia    • Anxiety and depression    • Bicuspid aortic valve    • Breast cancer (HCC) 2004    RIGHT, HAD NEELA. MASTECTOMY, CHEMO AND RADIATION   • CHF (congestive heart failure) (HCC)    • CKD (chronic kidney disease)    • Diabetes mellitus (HCC)    • Dialysis patient (HCC)     TUES AND SATURDAY REBEKA GUIDO   • Elevated cholesterol    • Frequent UTI    • Heart murmur    • History of kidney stones    • History of MRSA infection 2005    POST BREAST SURGERY-TREATED [facility]   • History of sepsis 2010    KIDNEY STONE   • History of transfusion    • Hyperlipidemia    • Hypertension    • Hyperthyroidism    • Hypothyroidism    • Kidney stone     CURRENT LEFT-DEC 2021   • Lymphedema of leg     LEFT   • Metastasis from breast cancer (HCC)     STOMACH-OMENTUM   • Neuromuscular disorder  ([facility])    • Obesity    • ANDREW on CPAP     CPAP   • Osteoarthrosis    • Peripheral neuropathy     FEET BILAT   • Radiculopathy    • Rectal bleeding 2022   • Thickened endometrium    • Weakness     BILAT LEGS-WITH ANY AMT OF TIME     Past Surgical History:   Procedure Laterality Date   • ARTERIOVENOUS FISTULA/SHUNT SURGERY Left 2021    Procedure: LEFT  BRACHIOCEPALIC ARTERIOVENOUS FISTULA;  Surgeon: Dejuan Adler MD;  Location: [facility] OR;  Service: Vascular;  Laterality: Left;   • BREAST RECONSTRUCTION      WITH IMPLANTS, AND REVISION, NOW REMOVED   • BREAST SURGERY     • CATARACT EXTRACTION WITH INTRAOCULAR LENS IMPLANT Bilateral    •  SECTION  1987   •  SECTION  1987   • CYSTOSCOPY W/ URETERAL STENT PLACEMENT     • CYSTOSCOPY W/ URETERAL STENT PLACEMENT Left 2021    Procedure: CYSTOSCOPY KEFT RETROGRADE PYELOGRAM  LEFT  URETERAL STENT PLACEMENT;  Surgeon: Leodan Mckee Jr., MD;  Location: [facility] OR;  Service: Urology;  Laterality: Left;   • CYSTOSCOPY W/ URETERAL STENT PLACEMENT Left 03/10/2022    Procedure: CYSTOSCOPY AND LEFT URETERAL STENT EXCHANGE, RETROGRADE PYELOGRAM;  Surgeon: Leodan Mckee Jr., MD;  Location: [facility] OR;  Service: Urology;  Laterality: Left;   • D & C HYSTEROSCOPY N/A 2017    Procedure: DILATATION AND CURETTAGE, HYSTEROSCOPY ;  Surgeon: Cherie Oliveros MD;  [place] [facility] [place] [facility]  Service:    • D & C HYSTEROSCOPY N/A 2019    Procedure: DILATATION AND CURETTAGE HYSTEROSCOPY/POLYPECTOMY;  Surgeon: Cherie Oliveros MD;  [place] [facility] [place] [facility]  Service: Gynecology   • INSERTION AND REMOVAL HEMODIALYSIS CATHETER Right 2021   • INSERTION HEMODIALYSIS CATHETER Right 2021    Procedure: HEMODIALYSIS CATHETER INSERTION;  Surgeon: Clint Hernández MD;  Location: [facility] MAIN OR;  Service: Vascular;  Laterality: Right;   • LYMPH NODE BIOPSY     • MASTECTOMY Bilateral       Family History   Problem Relation Age of Onset   • Heart attack Mother 72   • Coronary artery disease Mother         Mother  from MI at 72   • Diabetes Mother    • Breast cancer Mother    • Hyperlipidemia Mother    • Arthritis Mother    • Cancer Mother    • Heart attack Father 74   • Aortic aneurysm Father         Father with ruptured thoracic aortic aneurysm   • Coronary artery disease Father         Father  from MI at 74   • Heart disease Father    • Hyperlipidemia Father    • Arthritis Father    • Heart attack Maternal Grandmother 76   • Coronary artery disease Maternal Grandmother         MGM deceeased from MI at age 76   • Malig Hyperthermia Neg Hx      Social History     Tobacco Use   • Smoking status: Never Smoker   • Smokeless tobacco: Never Used   Vaping Use   • Vaping Use: Never used   Substance Use Topics   • Alcohol use: No   • Drug use: No     Medications Prior to Admission   Medication Sig Dispense Refill Last Dose   • Abemaciclib 50 MG tablet Take 1 tablet by mouth 2 (Two) Times a Day. 60 tablet 2 2022 at Unknown time   • acetaminophen (TYLENOL) 500 MG tablet Take 500 mg by mouth As Needed.   Past Week at Unknown time   • amLODIPine (NORVASC) 5 MG tablet Take 1 tablet by mouth Daily. 90 tablet 3 2022 at Unknown time   • Armodafinil 250 MG tablet Take 1 tablet by mouth Daily. 30 tablet 2 2022 at Unknown time   • aspirin 81 MG EC tablet Take 81 mg by mouth Daily.   2022 at Unknown time   • atorvastatin (LIPITOR) 40 MG tablet Take 1 tablet by mouth Every Night. 90 tablet 1 2022 at Unknown time   • carvedilol (COREG) 3.125 MG tablet Take 1 tablet by mouth 2 (Two) Times a Day. 180 tablet 3 2022 at Unknown time   • folic acid (FOLVITE) 1 MG tablet TAKE ONE TABLET BY MOUTH DAILY (Patient taking differently: Take 1 mg by mouth Daily.) 30 tablet 2 2022 at Unknown time   • gabapentin (Neurontin) 300 MG capsule Take 1 capsule by mouth Every Night. 30 capsule  "1 8/22/2022 at Unknown time   • Levemir FlexTouch 100 UNIT/ML injection Inject 40 Units under the skin into the appropriate area as directed Daily. (Patient taking differently: Inject 50 Units under the skin into the appropriate area as directed Daily.) 13 pen 1 8/22/2022 at Unknown time   • levothyroxine (SYNTHROID, LEVOTHROID) 50 MCG tablet Take 1 tablet by mouth Daily. 90 tablet 1 8/22/2022 at Unknown time   • NON FORMULARY Take 5 mg by mouth 2 (Two) Times a Day. Versinio   8/22/2022 at Unknown time   • QUEtiapine (SEROquel) 100 MG tablet Take 1 tablet by mouth Every Night. 90 tablet 0 8/22/2022 at Unknown time   • sertraline (ZOLOFT) 100 MG tablet Take 1.5 tablets by mouth Daily. 135 tablet 0 8/22/2022 at Unknown time   • vitamin B-12 (CYANOCOBALAMIN) 1000 MCG tablet Take 1,000 mcg by mouth Daily.   8/22/2022 at Unknown time   • NON FORMULARY Inject 1 dose under the skin into the appropriate area as directed Every 30 (Thirty) Days. facidex   8/18/2022   • ondansetron (Zofran) 8 MG tablet Take 1 tablet by mouth Every 8 (Eight) Hours As Needed for Nausea or Vomiting. 30 tablet 2 More than a month at Unknown time   • vitamin D (ERGOCALCIFEROL) 25838 units capsule capsule Take 50,000 Units by mouth Every 7 (Seven) Days. WEDNESDAY 8/17/2022     sodium chloride, 10 mL, Intravenous, Q12H      Allergies:  Patient has no known allergies.    Objective      Vital Signs  Temp:  [99.8 °F (37.7 °C)] 99.8 °F (37.7 °C)  Heart Rate:  [60-65] 61  Resp:  [15-20] 20  BP: (134-144)/(59-74) 139/68    Flowsheet Rows    Flowsheet Row First Filed Value   Admission Height 170.2 cm (67\") Documented at 08/23/2022 0713   Admission Weight 163 kg (360 lb) Documented at 08/23/2022 0713        170.2 cm (67\")    Physical Exam  Vitals reviewed.   Constitutional:       General: She is awake. She is not in acute distress.     Appearance: She is morbidly obese. She is not ill-appearing, toxic-appearing or diaphoretic.      Interventions: She is " not intubated.  HENT:      Head: Normocephalic and atraumatic. No right periorbital erythema or left periorbital erythema.      Jaw: There is normal jaw occlusion.      Nose: Nose normal. No nasal deformity, congestion or rhinorrhea.      Right Nostril: No foreign body or epistaxis.      Left Nostril: No foreign body or epistaxis.      Right Turbinates: Not enlarged.      Left Turbinates: Not enlarged.      Mouth/Throat:      Lips: Pink.      Mouth: Mucous membranes are dry.      Dentition: Normal dentition.      Tongue: No lesions.      Palate: No mass.      Pharynx: Oropharynx is clear. No oropharyngeal exudate or posterior oropharyngeal erythema.      Tonsils: No tonsillar exudate.   Eyes:      General: Lids are normal. No allergic shiner, visual field deficit or scleral icterus.        Right eye: No foreign body, discharge or hordeolum.         Left eye: No foreign body, discharge or hordeolum.      Extraocular Movements: Extraocular movements intact.      Right eye: Normal extraocular motion and no nystagmus.      Left eye: Normal extraocular motion and no nystagmus.      Conjunctiva/sclera: Conjunctivae normal.      Right eye: Right conjunctiva is not injected. No chemosis, exudate or hemorrhage.     Left eye: Left conjunctiva is not injected. No chemosis, exudate or hemorrhage.     Pupils: Pupils are equal, round, and reactive to light.   Neck:      Thyroid: No thyroid mass, thyromegaly or thyroid tenderness.      Vascular: Normal carotid pulses. No carotid bruit or hepatojugular reflux.      Trachea: Trachea and phonation normal. No tracheal tenderness, tracheostomy, abnormal tracheal secretions or tracheal deviation.      Meningeal: Brudzinski's sign and Kernig's sign absent.   Cardiovascular:      Rate and Rhythm: Normal rate and regular rhythm.  No extrasystoles are present.     Chest Wall: PMI is not displaced. No thrill.      Pulses:           Radial pulses are 2+ on the right side and 2+ on the left  side.        Femoral pulses are 2+ on the right side and 2+ on the left side.       Dorsalis pedis pulses are 2+ on the right side and 2+ on the left side.      Heart sounds: Murmur heard.    Systolic murmur is present with a grade of 4/6.   No diastolic murmur is present.    No friction rub. No gallop.   Pulmonary:      Effort: Pulmonary effort is normal. No tachypnea, bradypnea, accessory muscle usage, prolonged expiration, respiratory distress or retractions. She is not intubated.      Breath sounds: No stridor. Examination of the right-lower field reveals rales. Examination of the left-lower field reveals rales. Rales present. No wheezing or rhonchi.   Chest:      Chest wall: No mass, lacerations, deformity, swelling, tenderness, crepitus or edema. There is no dullness to percussion.   Breasts:      Right: No supraclavicular adenopathy.      Left: No supraclavicular adenopathy.        Comments: No sternal abnormality.  Right breast incision well-healed.  Abdominal:      General: Abdomen is protuberant. Bowel sounds are normal. There is no distension or abdominal bruit. There are no signs of injury.      Palpations: Abdomen is soft. There is no fluid wave.      Tenderness: There is no abdominal tenderness. There is no right CVA tenderness or left CVA tenderness. Negative signs include Abel's sign and McBurney's sign.      Comments: Morbidly obese.   Musculoskeletal:         General: Swelling present. No tenderness, deformity or signs of injury.      Cervical back: No edema, erythema, signs of trauma, rigidity, torticollis or tenderness. No pain with movement, spinous process tenderness or muscular tenderness. Decreased range of motion.      Right lower leg: 3+ Edema present.      Left lower leg: 3+ Edema present.   Lymphadenopathy:      Cervical: No cervical adenopathy.      Right cervical: No superficial cervical adenopathy.     Left cervical: No superficial cervical adenopathy.      Upper Body:      Right  upper body: No supraclavicular adenopathy.      Left upper body: No supraclavicular adenopathy.   Skin:     General: Skin is warm and dry.      Capillary Refill: Capillary refill takes 2 to 3 seconds.      Coloration: Skin is not jaundiced or pale.      Findings: Erythema present. No bruising, lesion or rash.      Comments: Bilateral lower extremity erythema.   Neurological:      General: No focal deficit present.      Mental Status: She is alert and oriented to person, place, and time. Mental status is at baseline.      GCS: GCS eye subscore is 4. GCS verbal subscore is 5. GCS motor subscore is 6.      Cranial Nerves: Cranial nerves are intact. No cranial nerve deficit.      Sensory: Sensation is intact. No sensory deficit.      Motor: Motor function is intact. No weakness.      Coordination: Coordination is intact. Coordination normal.      Gait: Gait abnormal.      Deep Tendon Reflexes: Reflexes normal.   Psychiatric:         Attention and Perception: Attention and perception normal.         Mood and Affect: Mood and affect normal.         Speech: Speech normal.         Behavior: Behavior normal. Behavior is cooperative.         Thought Content: Thought content normal. Thought content is not paranoid or delusional. Thought content does not include homicidal or suicidal ideation. Thought content does not include homicidal or suicidal plan.         Cognition and Memory: Cognition and memory normal.         Judgment: Judgment normal.         Results Review:   Lab Results (last 24 hours)     Procedure Component Value Units Date/Time    POC Panel 9, ISTAT [[id number]] Collected: 08/23/22 0919    Specimen: Blood Updated: 08/23/22 0924    POC Panel 9, ISTAT [[id number]]  (Abnormal) Collected: 08/23/22 0911    Specimen: Blood Updated: 08/23/22 0924     Hemoglobin 8.5 g/dL      Hematocrit 25 %      O2 Saturation, Arterial 71 %      Comment: Serial Number: [id number]:  [id number]       CBC & Differential [[id number]]   (Abnormal) Collected: 08/23/22 0718    Specimen: Blood Updated: 08/23/22 0726    Narrative:      The following orders were created for panel order CBC & Differential.  Procedure                               Abnormality         Status                     ---------                               -----------         ------                     CBC Auto Differential[[id number]]        Abnormal            Final result                 Please view results for these tests on the individual orders.    CBC Auto Differential [[id number]]  (Abnormal) Collected: 08/23/22 0718    Specimen: Blood Updated: 08/23/22 0726     WBC 4.96 10*3/mm3      RBC 2.70 10*6/mm3      Hemoglobin 8.2 g/dL      Hematocrit 25.7 %      MCV 95.2 fL      MCH 30.4 pg      MCHC 31.9 g/dL      RDW 16.7 %      RDW-SD 57.7 fl      MPV 8.5 fL      Platelets 266 10*3/mm3      Neutrophil % 68.1 %      Lymphocyte % 13.1 %      Monocyte % 7.5 %      Eosinophil % 9.5 %      Basophil % 0.8 %      Immature Grans % 1.0 %      Neutrophils, Absolute 3.38 10*3/mm3      Lymphocytes, Absolute 0.65 10*3/mm3      Monocytes, Absolute 0.37 10*3/mm3      Eosinophils, Absolute 0.47 10*3/mm3      Basophils, Absolute 0.04 10*3/mm3      Immature Grans, Absolute 0.05 10*3/mm3      nRBC 0.2 /100 WBC     POC Glucose Once [[id number]]  (Abnormal) Collected: 08/23/22 0714    Specimen: Blood Updated: 08/23/22 0716     Glucose 174 mg/dL      Comment: Meter: [id number] : [id number] Pablo Hurtado RN                 Assessment & Plan       Nonrheumatic aortic valve stenosis    Bicuspid aortic valve      Assessment & Plan    - Severe aortic valve stenosis -- bicuspid aortic valve   - ESRD on HD  - Metastatic breast cancer -- followed by oncology/hematology  - Obesity  - Immobility  - CHF  - HTN  - DM II  - ANDREW with CPAP at night  - History of MRSA infection  - Chronic normocytic anemia (occassional transfusions)  - Hypothyroidism     Saw patient after cardiac catheterization. Obtained  history and performed PE. Dr Pagan to review studies and give recommendation regarding intervention for severe aortic valve stenosis.     Thank you for allowing me to participate in the care of this patient.     Gris Cortez PA-C  08/23/22  10:48 EDT     Patient seen and examined.  All above findings confirmed.  Left heart cath and echocardiogram images reviewed.    Is a very pleasant 61-year-old  woman with severe aortic valve stenosis and chronic diastolic congestive heart failure, [place] Heart Association grade 3.  She also has several severe comorbidities, the most concerning of which is metastatic breast malignancy (stage IV).  Ideally, her aortic valve should be addressed directly.    An echocardiogram on 7/13/2022 showed an aortic valve area of 0.84 cm², a V-max of 4.49 m/s, and mean gradient of 45.9 mmHg.  Images also showed significant mitral annular calcification, moderate mitral valve stenosis with a mean gradient of 6.2 mmHg, mild tricuspid valve regurgitation, and estimated right ventricular systolic blood pressure of 38 mmHg, and a left ventricular ejection fraction over 50%.    Left heart catheterization earlier today (8/23/2022) shows a right coronary dominant system and no significant coronary artery disease.    Thank you for this kind consultation.    Addendum: I spoke with Dr. Irvin, her oncologist on 8/25/2022.  Although she has carried this diagnosis since at least 2019, she has not fully responded to treatment and in fact was being considered for chemotherapy.  Even in the best of circumstances if she agrees to chemotherapy Dr. Irvin can still say with full confidence that she would survive 24 months.  Usually we reserve TAVR for somebody with at least a 24-month expected survival.  I did discuss this briefly with Dr. Rivers; we will plan to discuss this further with the entire structural heart team.  Plan for TAVR protocol CT angiogram to see if she is a transfemoral TAVR  candidate.

## 2022-08-23 NOTE — NURSING NOTE
After speaking with Dr Rivers I met briefly with  Isabel and introduced her to the structural heart program I went over the evaluation process. Dr Pagan also met with patient this morning. I let her know I would call her in the next day or two to further discuss this process I answered her questions and provided our contact information

## 2022-08-23 NOTE — DISCHARGE INSTRUCTIONS
[facility]  [address] [place] [address]    Coronary Angiogram (Radial/Ulnar Approach) After Care    Refer to this sheet in the next few weeks. These instructions provide you with information on caring for yourself after your procedure. Your caregiver may also give you more specific instructions. Your treatment has been planned according to current medical practices, but problems sometimes occur. Call your caregiver if you have any problems or questions after your procedure.    Home Care Instructions:  You may shower the day after the procedure. Remove the bandage (dressing) and gently wash the site with plain soap and water. Gently pat the site dry. You may apply a band aid daily for 2 days if desired.    Do not apply powder or lotion to the site.  Do not submerge the affected site in water for 3 to 5 days or until the site is completely healed.   Do not lift, push or pull anything over 5 pounds for 5 days after your procedure or as directed by your physician.  As a reference, a gallon of milk weighs 8 pounds.   Inspect the site at least twice daily. You may notice some bruising at the site and it may be tender for 1 to 2 weeks.     Increase your fluid intake for the next 2 days.    Keep arm elevated for 24 hours. For the remainder of the day, keep your arm in “Pledge of Allegiance” position when up and about.     You may drive 24 hours after the procedure unless otherwise instructed by your caregiver.  Do not operate machinery or power tools for 24 hours.  A responsible adult should be with you for the first 24 hours after you arrive home. Do not make any important legal decisions or sign legal papers for 24 hours.  Do not drink alcohol for 24 hours.    Metformin or any medications containing Metformin should not be taken for 48 hours after your procedure.      Call Your Doctor if:   You have unusual pain at the radial/ulnar (wrist) site.  You have redness, warmth, swelling, or pain at the  radial/ulnar (wrist) site.  You have drainage (other than a small amount of blood on the dressing).  `You have chills or a fever > 101.  Your arm becomes pale or dark, cool, tingly, or numb.  You develop chest pain, shortness of breath, feel faint or pass out.    You have heavy bleeding from the site, hold pressure on the site for 20 minutes.  If the bleeding stops, apply a fresh bandage and call your cardiologist.  However, if you        continue to have bleeding, call 911 and continue to apply pressure to the site.   You have any symptoms of a stroke.  Remember BE FAST  B-balance. Sudden trouble walking or loss of balance.  E-eyes.  Sudden changes in how you see or a sudden onset of a very bad headache.   F-face. Sudden weakness or loss of feeling of the face or facial droop on one side.   A-arms Sudden weakness or numbness in one arm.  One arm drifts down if they are both held out in front of you. This happens suddenly and usually on one side of the body.   S-speech.  Sudden trouble speaking, slurred speech or trouble understanding what are saying.   T-time  Time to call emergency services.  Write down the symptoms and the time they started.              [facility]  [address] [place] [address]      Coronary Angiogram (Femoral Approach) After Care     Refer to this sheet in the next few weeks. These instructions provide you with information on caring for yourself after your procedure. Your health care provider may also give you more specific instructions. Your treatment has been planned according to current medical practices, but problems sometimes occur. Call your health care provider if you have any problems or questions after your procedure.      What to Expect After the Procedure:  After your procedure, it is typical to have the following sensations:  Minor discomfort or tenderness and a small bump at the catheter insertion site. The bump should usually decrease in size and tenderness  within 1 to 2 weeks.  Any bruising will usually fade within 2 to 4 weeks.    Home Care Instructions:  Do not apply powder or lotion to the site.  Do not take baths, swim, or use a hot tub until your health care provider approves and the site is completely healed.  Do not bend, squat, or lift anything over 20 lb (9 kg) or as directed by your health care provider. However, we recommend lifting nothing heavier than a gallon of milk.    You may shower 24 hours after the procedure. Remove the bandage (dressing) and gently wash the site with plain soap and water. Gently pat the site dry. You may apply a band aid daily for 2 days if desired.    Inspect the site at least twice daily.  Increase your fluid intake for the next 2 days.    Limit your activity for the first 48 hours. .    Avoid strenuous activity for 1 week or as advised by your physician.    Follow instructions about when you can drive or return to work as directed by your physician.    Hold direct pressure over the site when you cough, sneeze, laugh or change positions.  Do this for the next 2 days.    Do not operate machinery or power tools for 24 hours.  A responsible adult should be with you for the first 24 hours after you arrive home. Do not make any important legal decisions or sign legal papers for 24 hours.  Do not drink alcohol for 24 hours.  Metformin or any medications containing Metformin should not be taken for 48 hours after your procedure.      Call Your Doctor If:  You have drainage (other than a small amount of blood on the dressing).  You have chills or a fever > 101.  You have redness, warmth, swelling(larger than a walnut), or pain at the insertion site  You develop chest pain or shortness of breath, feel faint, or pass out.  You develop pain, discoloration, coldness, numbness, tingling, or severe bruising in the leg that held the catheter.  You develop bleeding from any other place, such as the bowels.  You have heavy bleeding from the  site, hold pressure on the site for 20 minutes.  If the bleeding stops, apply a fresh bandage and call your cardiologist.  However, if you continue to have bleeding, call 911.  You have any symptoms of a stroke.  Remember BE FAST  B-balance. Sudden trouble walking or loss of balance.  E-eyes.  Sudden changes in how you see or a sudden onset of a very bad headache.   F-face. Sudden weakness or loss of feeling of the face or facial droop on one side.   A-arms Sudden weakness or numbness in one arm.  One arm drifts down if they are both held out in front of you. This happens suddenly and usually on one side of the body.  S-speech.  Sudden trouble speaking, slurred speech or trouble understanding what people are saying.   T-time  Time to call emergency services.  Write down the symptoms and the time they started.        Make Sure You:  Understand these instructions.  Will watch your condition.  Will get help right away if you are not doing well or get worse.

## 2022-08-24 ENCOUNTER — TELEPHONE (OUTPATIENT)
Dept: ONCOLOGY | Facility: CLINIC | Age: 62
End: 2022-08-24

## 2022-08-24 ENCOUNTER — DOCUMENTATION (OUTPATIENT)
Dept: CARDIOLOGY | Facility: HOSPITAL | Age: 62
End: 2022-08-24

## 2022-08-24 NOTE — NURSING NOTE
I have spoken with Mrs Hall about the structural heart program and the evaluation process Dr Pagan recommends a TAVR CTA at this point this has been ordered and the patient is agreeable to having this completed in the next week or so. I have provided our contact information and she will call with any further questions

## 2022-08-24 NOTE — PROGRESS NOTES
Chief Complaint  Metastatic lobular breast cancer (ER/TX positive, HER-2/tj negative), anemia secondary to CKD3, CHF, peripheral neuropathy, chemotherapy related nausea chemotherapy-induced myelosuppression    Subjective        History of Present Illness  Patient returns today in follow-up with multiple issues to review.  She has been continuing on monthly Faslodex, last received on 8/18/2022 (1 week delay since she was on vacation).  Due to disease progression and significant myelosuppression from Ibrance, we discontinued treatment on 7/28/2022 and patient begin treatment with valacyclovir on 8/4/2022 at reduced dose initially of 50 mg twice daily.  The patient fortunately has been tolerating this very well, has had no side effects at all from the medication.    The patient has also experienced continued difficulty with anemia.  Hemoglobin declined again down to 6.8 on 8/18/2022 and required transfusion.  She had experienced some visible blood in her stool and stool cards for occult blood were positive x3 on 8/1/2022 indicating that there was was likely some component of anemia secondary to GI blood loss in addition to anemia related to her ESRD and myelosuppression from Ibrance.  She was referred to GI and saw Dr. Kinsey on 8/16/2022.  She discussed the potential risks of endoscopic evaluation given the patient's significant comorbidities and for now did not recommend pursuit of endoscopic evaluation.  The patient has seen no further blood in her stool and hemoglobin today is up to 9.0.    In regards to her ESRD, she has been continuing on hemodialysis every Monday and Friday and is receiving Epogen 20,000 units each time.  She is preparing however to transition to use of a home hemodialysis device which will involve treatment 5 days/week for 2 hours at home.  With this she is going to transition to use of Mircera every 4 weeks administered by nephrology.  She received her first dose Mircera on 8/22/2022.  "    Finally, she was evaluated recently by cardiology for her severe aortic stenosis.  Echocardiogram on 7/13/2022 showed ejection fraction 56-60%, grade 2 diastolic dysfunction, severe aortic stenosis.  She underwent cardiac catheterization on 8/23/2022 which showed normal coronary arteries, mild to moderate pulmonary hypertension.  She was evaluated by cardiothoracic surgery Dr. Pagan and there was discussion regarding potential candidacy for TAVR although there was concern given her underlying comorbidities including her metastatic breast cancer.  I did discuss patient's prognosis with him today on the telephone.  There is consideration of possible pursuit of balloon valvuloplasty initially to assess her symptomatic response and then consider pursuit of TAVR in the future.    Patient today remains in a motorized scooter.  She does report developing some generalized weakness when they were out of town on vacation in a restaurant and fell to her knees.  She had an injury to her right great toe however does not wish to pursue any evaluation of that.  She feels that her generalized weakness may have been related to her low calcium and she had not been taking her Tums.  She has not experienced any further episodes.  Bowel function has been relatively normal recently.  Fatigue is stable.    She does follow-up in the supportive oncology clinic, was last seen on 8/4/2022, continuing on gabapentin 300 mg nightly and armodafinil 250 mg daily as well as Zoloft 150 mg daily.    Objective   Vital Signs:   /82   Pulse 76   Temp 97.8 °F (36.6 °C) (Temporal)   Resp 16   Ht 170.2 cm (67.01\")   Wt (!) 171 kg (378 lb)   SpO2 94%   BMI 59.19 kg/m²     Physical Exam  Constitutional:       Appearance: She is well-developed. She is obese.      Comments: Patient is in a motorized scooter today   Eyes:      Conjunctiva/sclera: Conjunctivae normal.   Neck:      Thyroid: No thyromegaly.   Cardiovascular:      Rate and Rhythm: " Normal rate and regular rhythm.      Heart sounds: Murmur heard.     No friction rub. No gallop.      Comments: 2/6 murmur  Pulmonary:      Effort: No respiratory distress.      Breath sounds: Normal breath sounds.      Comments: Dialysis access in place in the right subclavian position  Chest:   Breasts:      Right: No supraclavicular adenopathy.      Left: No supraclavicular adenopathy.       Abdominal:      General: Bowel sounds are normal. There is no distension.      Palpations: Abdomen is soft.      Tenderness: There is no abdominal tenderness.   Musculoskeletal:         General: Swelling present.      Comments: 1+ bilateral lower extremity edema with venous stasis changes noted.   Lymphadenopathy:      Head:      Right side of head: No submandibular adenopathy.      Cervical: No cervical adenopathy.      Upper Body:      Right upper body: No supraclavicular adenopathy.      Left upper body: No supraclavicular adenopathy.   Skin:     General: Skin is warm and dry.      Findings: No bruising or rash.   Neurological:      Mental Status: She is alert and oriented to person, place, and time.      Cranial Nerves: No cranial nerve deficit.      Motor: No abnormal muscle tone.      Deep Tendon Reflexes: Reflexes normal.   Psychiatric:         Behavior: Behavior normal.        Patient was examined today, unchanged from above    Result Review : Reviewed CBC, CMP from today.  Reviewed records from cardiothoracic surgery.  Reviewed left heart catheterization report 8/23/2022.  Reviewed supportive oncology notes.       Assessment and Plan    *Metastatic lobular breast cancer (ER/VA positive, HER-2/tj negative):  · Previous stage IIIC right breast cancer in 2003, received neoadjuvant chemotherapy (unknown regimen) with subsequent bilateral mastectomies 1/22/2004 with right axillary dissection.  Residual 10-12 cm invasive lobular carcinoma on the right breast with 14/16+ nodes with extranodal extension.  Received adjuvant  carboplatin and Navelbine chemotherapy x4 cycles  · Attempted adjuvant radiation to the chest wall however interrupted due to cellulitis that required removal of implants in August 2004  · Received tamoxifen from 6/22/2004 until June 2009.  Transitioned to Femara and received until June 2014  · Identification of CT findings concerning for carcinomatosis on CT scans and June 2019.  · PET scan confirmed hypermetabolic activity in the omentum as well as small retroperitoneal lymph nodes.  · CT-guided omental biopsy 7/30/2019 with metastatic lobular carcinoma of breast primary, ER positive (greater than 95%), GA positive (90%), HER-2/tj negative (1+ IHC)  · [facility] liquid biopsy 2/5/2020: MSI undetermined, mutations in BETH, NF1, CHEK2.  No evidence of PIK3CA mutation.  · Subsequent [facility] germline testing 11/12/2021 with BETH VUS (c.2864C>A) and POLE VUS (c.631A>T)  · Initiation of Aromasin and Ibrance in August 2019  · Difficulty with myelosuppression related to Ibrance requiring dose alterations, eventually changed to 100 mg for 7 days on followed by 7 days off.  · CT chest abdomen pelvis 10/26/2021 with increase in left hydronephrosis with increase in left perinephric and periureteral stranding. Decrease in stone in the left kidney lower pole (7 mm).  Stable small retroperitoneal lymph and left periaortic lymph nodes.  · PET scan 11/10/2021 with multiple bilateral hypermetabolic nodular pulmonary lesions, asymmetric moderate to severe left hydronephrosis with ill-defined fat stranding and soft tissue thickening in the renal sinus and surrounding the left ureter, hypermetabolic left retroperitoneal lymph node with slight increase in size, ill-defined hypermetabolic thickening in the inferior omentum with increase in size, uptake and C7 (indeterminate, recommended MRI).  Short segments of uptake in the mid and distal esophagus possibly related to gastritis.  · PET scan findings felt to be consistent with  progressive malignancy.  Patient declined repeat omental biopsy.   · Options for further treatment discussed on 11/12/2021.  In light of renal failure and dialysis, options are more limited.  Recommendation to continue Ibrance and discontinue Aromasin, begin Faslodex.  Discussed possible future use of single agent Taxol.    · Aromasin discontinued 11/12/2021  · On 11/22/2021 initiation of Faslodex with continuation of Ibrance (100 mg daily for 7 days on followed by 7 days off).  · MRI cervical spine 11/18/2021 showed the right C7 normality to likely represent metastatic disease.  With solitary bone lesion, did not recommend pursuit of bone modifying agent.  · Following 2 cycles Faslodex/Ibrance, PET scan on 1/11/2022 showed no change in the soft tissue superior to the dome of the bladder with hypermetabolic activity (likely related to carcinomatosis).  Significant decrease in injection of fat around the left renal pelvis and stable retroperitoneal hypermetabolic lymph nodes, decrease in size and activity in small bilateral pulmonary nodules.  Findings felt to represent evidence of response to treatment.    · Ibrance held briefly beginning 1/28/2022 due to hospitalization with COVID-19 infection and C. difficile colitis.  Patient developed leukopenia/neutropenia.  Ibrance resumed at previous dosing (100 mg daily 7 days on followed by 7 days off) on 2/9/22.  · Following 5 cycles Faslodex/Ibrance PET scan 4/19/2022 with evidence of mixed response.  New hypermetabolic lesion spinous process L2 (SUV 5.1) and slight increase in activity left clavicular metastasis (SUV increased 4.6-8.1).  C7 hypermetabolic mixed lytic and blastic bone lesion was unchanged.  Decrease in uptake involving omental thickening.  Stable soft tissue thickening around the left renal pelvis and ureter.  Discussion again regarding potential pursuit of IV chemotherapy with Taxol versus continuation of Ibrance and Faslodex with radiation to sites of  bony disease at L2, left clavicle, C7.  Patient opted to defer initiation of systemic chemotherapy and continue Ibrance/Faslodex with consideration of radiation.  · Initiated monthly Xgeva on 5/19/2022 (cleared with nephrology).  Xgeva subsequently held due to significant hypocalcemia.  Decision made to forego further Xgeva with persistent hypocalcemia.  · Palliative radiation received to lumbar spine regarding L2 metastasis 5/23- 6/7/2022  · Ibrance held during radiation therapy beginning 5/22/2022.  Ibrance resumed 6/16/2022.  · PET scan 7/21/2022 with stable hypermetabolic abdominal pelvic lymph nodes and subcarinal lymph node, stable bone lesions at C7, left clavicle.  Stable soft tissue thickening around the left renal pelvis with left hydronephrosis.  Uptake in adrenal glands felt to be likely reactive (no change in size).  No activity noted at L2 (previously radiated).  New hypermetabolic lesion right anterior sixth rib.  · With evidence of further slight progression in bone on PET scan (new right sixth rib lesion), on 7/28/2022 discontinued Ibrance and plan to transition to abemaciclib with continuation of Faslodex.    · On 8/4/2022 initiated abemaciclib at reduced dose of 50 mg twice daily.  · On 8/25/2022, increased abemaciclib dose to 100 mg twice daily  · Patient returns today in follow-up having received cycle 10 Faslodex on 8/18/2022 and currently continuing on abemaciclib at 50 mg twice daily that was initiated 8/4/2022 after discontinuation of previous Ibrance.  She has tolerated abemaciclib without any side effects.  Therefore we discussed increasing her dose up to 100 mg twice daily.  We will continue monitoring her counts on a weekly basis in relation to her anemia (see below) which is multifactorial related to ESRD, myelosuppression from CDK 4/6 inhibitor, underlying malignancy, and possible ongoing GI blood loss (heme positive stool).  In 3 weeks she will have nurse practitioner visit and will  be due for cycle 11 Faslodex.  If she continues to tolerate abemaciclib without side effects, consider increasing dose up to 150 mg twice daily. In 6 weeks she will undergo PET scan and I will see her back in 7 weeks when she is due for cycle 12 Faslodex to review results.    *Anemia secondary to ESRD, underlying malignancy/chronic disease, myelosuppression from CDK 4/6 inhibitor, GI blood loss:  · Anemia secondary to ESRD, malignancy with exacerbation by use of Ibrance  · Previous evidence of folate deficiency 8/12/2019 with level 3.84, initiated folic acid 1 mg daily  · Previous evidence of iron deficiency, received Injectafer on 8/7/2020 750 mg IV x1 dose.  Second dose not administered due to onset of fever, subsequent iron studies precluded further administration of IV iron.  · Previous low normal B12 level initiated oral B12 1000 mcg daily.  · Patient had previously received Procrit and required intermittent transfusion support  · Following initiation of hemodialysis, management of BEAU transitioned to nephrology, receiving Epogen with dialysis.  · Ongoing transfusion support prompted alteration in Ibrance dosing on 8/23/2021.  · After alteration in Ibrance dosing, hemoglobin improved and remained stable in the 9-10 range.  · Improved but ongoing intermittent need for transfusion support despite Ibrance dose alteration  · Stool negative for occult blood x3 on 11/2/2021  · Patient with reduced dialysis frequency to 2 days/week with concurrent decrease in Epogen dosing corresponding again to increased transfusion requirement.  · Epogen dose increased per nephrology to 20,000 units twice weekly with dialysis on Mondays and Fridays  · Ibrance again exacerbating anemia with ongoing intermittent transfusion requirements.  Ibrance subsequently discontinued on 7/28/2022 and patient initiated abemaciclib on 8/4/2022 which may be less myelosuppressive.  · Additional transfusions required with hemoglobin on 3/31/2022 6.6,  hemoglobin 4/21/2022 of 6.4, hemoglobin on 5/5/2022 of 6.8, hemoglobin 6.4 on 7/14/2022, hemoglobin 6.4 on 7/28/2022, hemoglobin 6.8 on 8/18/2022.  · Patient did develop transient visible blood in stool in July 2022  · Anemia evaluation 7/28/2022 with iron 32, ferritin 412, iron saturation 15%, TIBC 210, folate 19.9, B12 925, haptoglobin 300, .  · Stool positive for occult blood x3 on 8/1/2022  · Patient was seen by GI on 8/16/2022, felt to be high risk for endoscopic evaluation and elected to forego EGD/colonoscopy for now  · Patient transitioned from hemodialysis in clinic to home hemodialysis 5 days/week x 2 hours beginning 8/29/2022.  With transition to home dialysis, nephrology transition the patient from Epogen to Mircera administered every 4 weeks in their office, initiated 8/22/2022.  · Patient returns today in follow-up with hemoglobin that is improved significantly up to 9.0, higher than it has been in quite some time.  She has multifactorial anemia related to ESRD, underlying malignancy/chronic disease, myelosuppressive effects of CDK 4/6 inhibitor, GI blood loss.  She did undergo recent transfusion support following hemoglobin of 6.8 on 8/18/2022.  She did have stool occult blood positive x3 on 8/1/2022 however is not had any further visible blood in her stool.  She did see GI on 8/16/2022 however it was felt she would be very high risk for endoscopic evaluation and it was elected to defer EGD and colonoscopy at this time.  We will need to revisit the issue if she continues with significant transfusion requirements.  As above she has also transition from Epogen dosing on Mondays and Fridays with dialysis to Mircera dosing every 4 weeks with nephrology as she transitions to a home dialysis device.  I did suggest that we continue monitoring her hemoglobin on a weekly basis with additional transfusion support in the outpatient setting for hemoglobin less than 7.  Patient was in agreement.    *ESRD on  HD:  · Patient with previous CKD3/4  · Hospitalization 4/29-5/4/2021 with RYAN/CKD, UTI, anemia.  Required initiation of hemodialysis Tuesday Thursday Saturday.   · Decrease in frequency of dialysis to twice per week.  She continues to produce a significant amount of urine.   · Status post left upper extremity AV fistula placement on 11/3/2021 by vascular surgery  · Attempt to hold further dialysis on 1/11/2022 with close monitoring of her laboratory and clinical parameters.  Dialysis quickly resumed due to development of hyperkalemia.  · Patient was undergoing dialysis 2 days/week on Mondays and Fridays.    · On 8/22/2022 patient transitioning to use of home dialysis device 5 days/week x 2 hours.    *CHF with severe aortic stenosis:  · Echocardiogram 7/12/2019 showing ejection fraction 48% with moderate dilation of the LV cavity, global hypokinesis, grade 2 diastolic dysfunction, mild to moderate aortic stenosis, trivial pericardial effusion.  · Echocardiogram 7/19/2021 with ejection fraction 56%, left atrial volume moderately increased, grade 2 diastolic dysfunction, severe calcification aortic valve, moderate aortic stenosis, severe mitral calcification, mild mitral stenosis, marked elevation in RVSP from tricuspid regurgitation.  · Echocardiogram on 7/13/2022 with ejection fraction 56-60%, grade 2 diastolic dysfunction, severe aortic stenosis.    · Cardiac catheterization 8/23/2022 showed normal coronary arteries, mild to moderate pulmonary hypertension.    · She was evaluated by cardiothoracic surgery Dr. Pagan and there was discussion regarding potential candidacy for TAVR although there was concern given her underlying comorbidities including her metastatic breast cancer.  I did discuss patient's prognosis with him today on the telephone.  She does have opportunities for additional treatment of her malignancy with intravenous chemotherapy and is willing to pursue this in the future when needed.  It is unclear  as to her expected survival however given her multiple severe comorbidities with metastatic breast cancer, ESRD on dialysis, severe aortic stenosis, severe anemia.  There is consideration of possible pursuit of balloon valvuloplasty initially to assess her symptomatic response and then consider pursuit of TAVR in the future.  We will await further recommendations after she is discussed in their multidisciplinary conference next week.    *Left upper and bilateral lower extremity peripheral neuropathy:  · Patient reports that left upper extremity neuropathy was not present from previous chemotherapy but occurred after hospitalization that required intubation and critical care stay.  Apparently felt to represent critical care neuropathy.  Improved over time.  · Bilateral lower extremity neuropathy felt to be related to diabetes  · May have future implications regarding treatment for breast cancer.  · Patient reports that peripheral neuropathy symptoms continue in the bilateral lower extremities, unchanged.  This will be a consideration with potential future use of Taxol    *Uterine/endometrial activity on PET scan:  · Patient experienced postmenopausal vaginal bleeding in 2013, negative endometrial biopsy and gynecologic evaluation.  · With findings on PET scan with enlarging uterus and some hypermetabolic activity, referred back to Dr. Oliveros in gynecology.    · 9/19/2019 D&C with hysteroscopy by Dr. Oliveros, no significant intraoperative findings, pathologic results with benign findings, no evidence of malignancy.    *Nausea:  · Mild, relieved with Zofran.   · Patient experienced improvement in nausea after initiating hemodialysis  · No recent nausea    *Myelosuppression secondary to CDK 4/6 inhibitor:  · Patient was able to maintain adequate WBC after dosing alteration on Ibrance to treatment at 100 mg daily for 7 days on followed by 7 days off.  · Subsequent transition from Ibrance to abemaciclib  · Today, WBC 4.77,  ANC 3.15    *Depression  · Patient with history of depression, worsened after the death of her son in November 2021  · With evidence of progressive malignancy in November 2021, patient agreeable to referral to supportive oncology  · Patient currently receiving gabapentin 300 mg nightly, Zoloft 150 mg daily, armodafinil 250 mg daily  · Patient does continue with difficulties regarding depression that wax and wane.  Currently symptoms under fair control.  Patient last seen by supportive oncology on 8/4/2022.    *Left perinephric stranding secondary to malignancy with hydronephrosis:  · CT 4/22/2021 showed interval enlargement of 2 staghorn calculi in the left kidney with persistent left perinephric stranding  · Hospitalization 4/29-5/4/2021 with RYAN/CKD, UTI, anemia.  Required 2 unit PRBC transfusion and initiated hemodialysis Tuesday Thursday Saturday.    · Discussion from urology regarding potential need for surgical procedure to address staghorn calculus left kidney  · CT scan 7/2/2021 with only 1 remaining left renal stone identified which had decreased in size.  Patient appears to have passed the other stone.  · As above, CT 10/26/2021 with more prominent left hydronephrosis and increase in left perinephric and periureteral stranding.  Felt to possibly be related to passage of recent stone versus partial obstruction secondary to debris or thrombus in the left ureter versus pyelonephritis.  Patient however with no clinical evidence of recent stone passage nor pyelonephritis. Malignancy felt to be the cause of CT changes around the left kidney at this point.   · Left ureteral stent replacement 12/16/2021  · PET scan 1/11/2022 with decrease in fat injection near left renal pelvis, stent in satisfactory position.  Mild residual hydronephrosis on the left.  · Ureteral stent replaced on 3/10/2022  · PET scan 4/19/2022 with no hydronephrosis, left ureteral stent in place  · PET scan 7/21/2022 with persistent left  hydronephrosis, ureteral stent in place    *Right thyroid nodules  · Patient underwent prior thyroid biopsy by her report with benign findings many years ago, records not available  · On MRI cervical spine 11/18/2021, finding of 1.8 cm right thyroid nodule  · Thyroid ultrasound 11/26/2021 with right-sided thyroid nodules, 1 nodule was hypoechoic, solid measuring 2 cm with biopsy recommended.  · Thyroid ultrasound results reviewed with patient.  In light of her metastatic breast cancer, renal failure the significance of the thyroid nodule is felt to be much less.  We discussed possibility of thyroid biopsy however patient is inclined to forego this, especially since she has had a biopsy performed in the past with benign findings by her report.    · No hypermetabolic activity identified on PET scan 1/11/2022 in the neck    *Hospitalization 1/28-1/30/2022 due to COVID-19 infection and C. difficile colitis  · Patient fully vaccinated with 3 doses Moderna COVID-19 vaccine  · Patient developed symptoms 1/26/2022 with abrupt onset sinus congestion, mild cough, subsequently developed nausea and diarrhea on 1/27/2022.  Significant fatigue.  · Patient tested positive in emergency department on 1/28/2022 and was admitted  · Patient maintained O2 saturation in mid 90% range on room air  · Patient received remdesivir  · Patient was also found to be positive for C. difficile colitis, received course of oral vancomycin.  · Symptoms from both COVID-19 and C. difficile colitis ultimately resolved.    *Hypocalcemia  · Patient developed significant hypocalcemia after receiving Xgeva on 5/19/2022  · Calcium management per nephrology  · No further Xgeva planned due to persistent significant hypocalcemia  · Calcium today 8.7.    Plan:  1. Patient is continuing on Faslodex 500 mg IM injection every 28 days, received cycle 10 on 8/18/2022.  2. Increase abemaciclib dose from 50 mg twice daily up to 100 mg twice daily  3. Continue oral folic  acid 1 mg daily, B12 1000 mcg daily.  4. The patient is transitioning from hemodialysis on Mondays and Fridays to home dialysis device 5 days/week x 2 hours beginning 8/29/2022.  5. Patient has transitioned from Epogen dosing with dialysis to Mircera dosing every 4 weeks per nephrology.  First Mircera dose administered 8/22/2022.  6. Patient is continuing follow-up with cardiology and cardiothoracic surgery regarding management of severe aortic stenosis.  Await recommendations from multidisciplinary conference next week regarding possible TAVR versus initial balloon valvuloplasty.  7. Continue to monitor hemoglobin closely regarding ongoing transfusion needs for hemoglobin less than 7.0.  8. In 1, 2 weeks CBC and RN review  9. In 3 weeks NP visit with CBC, CMP and Faslodex cycle 11  10. In 4, 5, 6 weeks CBC and RN review  11. In 6 weeks PET scan  12. In 7 weeks MD visit with CBC, CMP and cycle 12 Faslodex.    Patient continues on high risk medication requiring intensive monitoring.    I did spend 45 minutes in total time caring for patient today, time spent in review of records, face-to-face consultation, coordination of care, placement of orders, completion of documentation.

## 2022-08-25 ENCOUNTER — OFFICE VISIT (OUTPATIENT)
Dept: ONCOLOGY | Facility: CLINIC | Age: 62
End: 2022-08-25

## 2022-08-25 ENCOUNTER — LAB (OUTPATIENT)
Dept: LAB | Facility: HOSPITAL | Age: 62
End: 2022-08-25

## 2022-08-25 VITALS
SYSTOLIC BLOOD PRESSURE: 141 MMHG | OXYGEN SATURATION: 94 % | DIASTOLIC BLOOD PRESSURE: 82 MMHG | HEIGHT: 67 IN | BODY MASS INDEX: 45.99 KG/M2 | RESPIRATION RATE: 16 BRPM | HEART RATE: 76 BPM | TEMPERATURE: 97.8 F | WEIGHT: 293 LBS

## 2022-08-25 DIAGNOSIS — C50.919 METASTATIC BREAST CANCER: Primary | ICD-10-CM

## 2022-08-25 DIAGNOSIS — C50.919 METASTATIC BREAST CANCER: ICD-10-CM

## 2022-08-25 LAB
ALBUMIN SERPL-MCNC: 3.5 G/DL (ref 3.5–5.2)
ALBUMIN/GLOB SERPL: 0.9 G/DL (ref 1.1–2.4)
ALP SERPL-CCNC: 87 U/L (ref 38–116)
ALT SERPL W P-5'-P-CCNC: 7 U/L (ref 0–33)
ANION GAP SERPL CALCULATED.3IONS-SCNC: 14 MMOL/L (ref 5–15)
AST SERPL-CCNC: 9 U/L (ref 0–32)
BASOPHILS # BLD AUTO: 0.07 10*3/MM3 (ref 0–0.2)
BASOPHILS NFR BLD AUTO: 1.2 % (ref 0–1.5)
BILIRUB SERPL-MCNC: 0.3 MG/DL (ref 0.2–1.2)
BUN SERPL-MCNC: 47 MG/DL (ref 6–20)
BUN/CREAT SERPL: 7.9 (ref 7.3–30)
CALCIUM SPEC-SCNC: 8.6 MG/DL (ref 8.5–10.2)
CHLORIDE SERPL-SCNC: 106 MMOL/L (ref 98–107)
CO2 SERPL-SCNC: 22 MMOL/L (ref 22–29)
CREAT SERPL-MCNC: 5.95 MG/DL (ref 0.6–1.1)
DEPRECATED RDW RBC AUTO: 67.8 FL (ref 37–54)
EGFRCR SERPLBLD CKD-EPI 2021: 7.5 ML/MIN/1.73
EOSINOPHIL # BLD AUTO: 0.49 10*3/MM3 (ref 0–0.4)
EOSINOPHIL NFR BLD AUTO: 8.2 % (ref 0.3–6.2)
ERYTHROCYTE [DISTWIDTH] IN BLOOD BY AUTOMATED COUNT: 17.9 % (ref 12.3–15.4)
GLOBULIN UR ELPH-MCNC: 4 GM/DL (ref 1.8–3.5)
GLUCOSE SERPL-MCNC: 172 MG/DL (ref 74–124)
HCT VFR BLD AUTO: 31.5 % (ref 34–46.6)
HGB BLD-MCNC: 9 G/DL (ref 12–15.9)
IMM GRANULOCYTES # BLD AUTO: 0.07 10*3/MM3 (ref 0–0.05)
IMM GRANULOCYTES NFR BLD AUTO: 1.2 % (ref 0–0.5)
LYMPHOCYTES # BLD AUTO: 0.71 10*3/MM3 (ref 0.7–3.1)
LYMPHOCYTES NFR BLD AUTO: 11.9 % (ref 19.6–45.3)
MCH RBC QN AUTO: 29.7 PG (ref 26.6–33)
MCHC RBC AUTO-ENTMCNC: 28.6 G/DL (ref 31.5–35.7)
MCV RBC AUTO: 104 FL (ref 79–97)
MONOCYTES # BLD AUTO: 0.41 10*3/MM3 (ref 0.1–0.9)
MONOCYTES NFR BLD AUTO: 6.9 % (ref 5–12)
NEUTROPHILS NFR BLD AUTO: 4.21 10*3/MM3 (ref 1.7–7)
NEUTROPHILS NFR BLD AUTO: 70.6 % (ref 42.7–76)
NRBC BLD AUTO-RTO: 0.5 /100 WBC (ref 0–0.2)
PLATELET # BLD AUTO: 241 10*3/MM3 (ref 140–450)
PMV BLD AUTO: 8.5 FL (ref 6–12)
POTASSIUM SERPL-SCNC: 5.2 MMOL/L (ref 3.5–4.7)
PROT SERPL-MCNC: 7.5 G/DL (ref 6.3–8)
RBC # BLD AUTO: 3.03 10*6/MM3 (ref 3.77–5.28)
SODIUM SERPL-SCNC: 142 MMOL/L (ref 134–145)
WBC NRBC COR # BLD: 5.96 10*3/MM3 (ref 3.4–10.8)

## 2022-08-25 PROCEDURE — 85025 COMPLETE CBC W/AUTO DIFF WBC: CPT

## 2022-08-25 PROCEDURE — 80053 COMPREHEN METABOLIC PANEL: CPT

## 2022-08-25 PROCEDURE — 99215 OFFICE O/P EST HI 40 MIN: CPT | Performed by: INTERNAL MEDICINE

## 2022-08-25 PROCEDURE — 36415 COLL VENOUS BLD VENIPUNCTURE: CPT

## 2022-08-26 ENCOUNTER — SPECIALTY PHARMACY (OUTPATIENT)
Dept: PHARMACY | Facility: HOSPITAL | Age: 62
End: 2022-08-26

## 2022-08-26 DIAGNOSIS — C50.919 METASTATIC BREAST CANCER: ICD-10-CM

## 2022-08-26 RX ORDER — FOLIC ACID 1 MG/1
TABLET ORAL
Qty: 30 TABLET | Refills: 2 | Status: SHIPPED | OUTPATIENT
Start: 2022-08-26 | End: 2022-11-21

## 2022-08-29 ENCOUNTER — SPECIALTY PHARMACY (OUTPATIENT)
Dept: PHARMACY | Facility: HOSPITAL | Age: 62
End: 2022-08-29

## 2022-08-29 RX ORDER — AMLODIPINE BESYLATE 5 MG/1
5 TABLET ORAL DAILY
Qty: 90 TABLET | Refills: 3 | Status: SHIPPED | OUTPATIENT
Start: 2022-08-29 | End: 2022-11-10

## 2022-08-29 NOTE — PROGRESS NOTES
I called [facility] to determine whether the Verzenio was delivered to pt. The script that was called in on 8/26/22 was delivered to pt on 8/27/22 per Sneha.

## 2022-08-29 NOTE — TELEPHONE ENCOUNTER
08/29/22   Pt called she is out of her Amlodipine and she states [facility] has called 5 times.   I spoke with opt let her know we have not received a call, fax or electronic request.   I let her know we are having lots of problems with local pharmies getting pt meds filled and telling pt's they have not heard from our office.   She will keep me update if she has any issues getting her medication.   Pan PETTIT

## 2022-08-30 ENCOUNTER — APPOINTMENT (OUTPATIENT)
Dept: CT IMAGING | Facility: HOSPITAL | Age: 62
End: 2022-08-30

## 2022-08-31 ENCOUNTER — OFFICE VISIT (OUTPATIENT)
Dept: PSYCHIATRY | Facility: HOSPITAL | Age: 62
End: 2022-08-31

## 2022-08-31 DIAGNOSIS — F32.A ANXIETY AND DEPRESSION: ICD-10-CM

## 2022-08-31 DIAGNOSIS — F43.21 GRIEF: Primary | ICD-10-CM

## 2022-08-31 DIAGNOSIS — R53.83 FATIGUE, UNSPECIFIED TYPE: ICD-10-CM

## 2022-08-31 DIAGNOSIS — F41.9 ANXIETY AND DEPRESSION: ICD-10-CM

## 2022-08-31 DIAGNOSIS — G47.33 OSA (OBSTRUCTIVE SLEEP APNEA): ICD-10-CM

## 2022-08-31 DIAGNOSIS — F41.1 GENERALIZED ANXIETY DISORDER: ICD-10-CM

## 2022-08-31 DIAGNOSIS — F33.41 RECURRENT MAJOR DEPRESSIVE DISORDER, IN PARTIAL REMISSION: ICD-10-CM

## 2022-08-31 PROCEDURE — 99214 OFFICE O/P EST MOD 30 MIN: CPT | Performed by: NURSE PRACTITIONER

## 2022-08-31 RX ORDER — ARMODAFINIL 250 MG/1
250 TABLET ORAL DAILY
Qty: 30 TABLET | Refills: 2 | Status: SHIPPED | OUTPATIENT
Start: 2022-08-31 | End: 2022-11-07 | Stop reason: SDUPTHER

## 2022-08-31 RX ORDER — GABAPENTIN 300 MG/1
300 CAPSULE ORAL NIGHTLY
Qty: 60 CAPSULE | Refills: 2 | Status: SHIPPED | OUTPATIENT
Start: 2022-08-31 | End: 2022-11-07 | Stop reason: SDUPTHER

## 2022-08-31 RX ORDER — SERTRALINE HYDROCHLORIDE 100 MG/1
150 TABLET, FILM COATED ORAL DAILY
Qty: 135 TABLET | Refills: 0 | Status: SHIPPED | OUTPATIENT
Start: 2022-08-31 | End: 2022-11-07 | Stop reason: SDUPTHER

## 2022-08-31 RX ORDER — QUETIAPINE FUMARATE 100 MG/1
100 TABLET, FILM COATED ORAL NIGHTLY
Qty: 90 TABLET | Refills: 0 | Status: SHIPPED | OUTPATIENT
Start: 2022-08-31 | End: 2022-09-26 | Stop reason: ALTCHOICE

## 2022-08-31 NOTE — PROGRESS NOTES
Subjective  Patient ID: Juana Hall is a 61 y.o.. female seen in regularly scheduled group session.  Group participants have consented to group conducted via video through Zoom.  Telehealth provided in patient's home.  Location of provider: [facility]    Total Group Time, Face to Face via Zoom: 55 minutes  Total Group Participants: 7, plus facilitation per Dr. Mary Vazquez and BETY Daly    Group Therapy  Group topics explored including development of new normal, interpersonal sensitivity, short and long term effects of diagnosis and treatment, relationship dynamics, anticipitory anxiety, difficulty connecting with others, pain management, physical deconditioning, concerns regarding cost of treatment, expectation of return to previous normal, provider burden, physical limitations, anxiety surrounding adjusted treatment plan, anxiety surrounding completion of treatment or reduced frequency of follow up visits and new victories, sharing of good news. New group for many members; share hopes for universality, learning helpful strategies, and challenges managing current physical and behavioral sx.    Counseling provided regarding reintroduction to activity through graded tasks, recognition and allowance of need for rest, importance of continued follow up , benefits of peer support including therapeutic factors of group, balancing avoidance with approach , discussion of need for restorative sleep , early and late treatment effects with potential strategies for managing persistent symptoms, behavioral activation, medication education, anticipatory anxiety, fears of recurrence, risks and expectations alongside prescribing of controlled substances, specifically regarding risk management and safe use, as well as expectations of prescribing, refills, storage, and diversion. , exploration of quality of life goals, palliative care discussion, effective communication strategies, positive and  negative coping strategies, existential distress and allowance of self care. Considered benefits of group, challenges managing medical complexity, importance of space to meet personal needs.    Patient response to group: Pt is engaged in group discussion and participates fully    Medications Management  Dr. Mary Vazquez and BETY Daly present for medication discussion and management.  Pt continues in survivorship of metastatic breast cancer.    Constitutional Exam: Pt is alert, oriented, and engaged in conversation; affect and mood demoralized, although stable.  CC: depression, anxiety, grief  HPI: Pt is seen in shared medical appointment for continued mgmt of anxiety and depression amidst significant medical complexity. Pt continues to identify significant complexity, upcoming cardiac procedure, and recent transition to in home dialysis which she receives regularly. Reports improved sleep with addition of gabapentin, despite persistent traumatic ruminations regarding death of son.   Psychological ROS: positive for - anxiety and depression  Current medication regimen, inclusive of armodafinil 250 mg daily, zoloft 150 mg daily, gabapentin 300 mg q hs, and seroquel 100 mg q hs reviewed and renewed as appropriate. Increase gabapentin to 300 mg q dinner and 300 mg q hs to assist with continued traumatic ruminations.     Diagnoses and all orders for this visit:    1. Grief (Primary)    2. Anxiety and depression  -     sertraline (ZOLOFT) 100 MG tablet; Take 1.5 tablets by mouth Daily.  Dispense: 135 tablet; Refill: 0  -     gabapentin (Neurontin) 300 MG capsule; Take 1 capsule by mouth Every Night.  Dispense: 60 capsule; Refill: 2    3. Fatigue, unspecified type  -     Armodafinil 250 MG tablet; Take 1 tablet by mouth Daily.  Dispense: 30 tablet; Refill: 2    4. ANDREW (obstructive sleep apnea)  -     Armodafinil 250 MG tablet; Take 1 tablet by mouth Daily.  Dispense: 30 tablet; Refill: 2    5. Generalized  anxiety disorder    6. Recurrent major depressive disorder, in partial remission (HCC)    Other orders  -     QUEtiapine (SEROquel) 100 MG tablet; Take 1 tablet by mouth Every Night.  Dispense: 90 tablet; Refill: 0

## 2022-09-01 ENCOUNTER — LAB (OUTPATIENT)
Dept: LAB | Facility: HOSPITAL | Age: 62
End: 2022-09-01

## 2022-09-01 ENCOUNTER — CLINICAL SUPPORT (OUTPATIENT)
Dept: ONCOLOGY | Facility: HOSPITAL | Age: 62
End: 2022-09-01

## 2022-09-01 DIAGNOSIS — C50.919 METASTATIC BREAST CANCER: ICD-10-CM

## 2022-09-01 LAB
BASOPHILS # BLD AUTO: 0.03 10*3/MM3 (ref 0–0.2)
BASOPHILS NFR BLD AUTO: 0.6 % (ref 0–1.5)
DEPRECATED RDW RBC AUTO: 63.1 FL (ref 37–54)
EOSINOPHIL # BLD AUTO: 0.38 10*3/MM3 (ref 0–0.4)
EOSINOPHIL NFR BLD AUTO: 8.2 % (ref 0.3–6.2)
ERYTHROCYTE [DISTWIDTH] IN BLOOD BY AUTOMATED COUNT: 17.4 % (ref 12.3–15.4)
HCT VFR BLD AUTO: 31.1 % (ref 34–46.6)
HGB BLD-MCNC: 9.6 G/DL (ref 12–15.9)
IMM GRANULOCYTES # BLD AUTO: 0.06 10*3/MM3 (ref 0–0.05)
IMM GRANULOCYTES NFR BLD AUTO: 1.3 % (ref 0–0.5)
LYMPHOCYTES # BLD AUTO: 0.82 10*3/MM3 (ref 0.7–3.1)
LYMPHOCYTES NFR BLD AUTO: 17.6 % (ref 19.6–45.3)
MCH RBC QN AUTO: 30.3 PG (ref 26.6–33)
MCHC RBC AUTO-ENTMCNC: 30.9 G/DL (ref 31.5–35.7)
MCV RBC AUTO: 98.1 FL (ref 79–97)
MONOCYTES # BLD AUTO: 0.46 10*3/MM3 (ref 0.1–0.9)
MONOCYTES NFR BLD AUTO: 9.9 % (ref 5–12)
NEUTROPHILS NFR BLD AUTO: 2.9 10*3/MM3 (ref 1.7–7)
NEUTROPHILS NFR BLD AUTO: 62.4 % (ref 42.7–76)
NRBC BLD AUTO-RTO: 0 /100 WBC (ref 0–0.2)
PLATELET # BLD AUTO: 169 10*3/MM3 (ref 140–450)
PMV BLD AUTO: 9.1 FL (ref 6–12)
RBC # BLD AUTO: 3.17 10*6/MM3 (ref 3.77–5.28)
WBC NRBC COR # BLD: 4.65 10*3/MM3 (ref 3.4–10.8)

## 2022-09-01 PROCEDURE — G0463 HOSPITAL OUTPT CLINIC VISIT: HCPCS

## 2022-09-01 PROCEDURE — 85025 COMPLETE CBC W/AUTO DIFF WBC: CPT

## 2022-09-01 PROCEDURE — 36415 COLL VENOUS BLD VENIPUNCTURE: CPT

## 2022-09-01 NOTE — PROGRESS NOTES
Pt present for visit. Voices no new concerns. Confirms taking Verzenio 100 mg twice daily with no issues. Pt does not require transfusion support today. Offered copy of labs and reviewed schedule. Pt v/u.    Lab Results   Component Value Date    WBC 4.65 09/01/2022    HGB 9.6 (L) 09/01/2022    HCT 31.1 (L) 09/01/2022    MCV 98.1 (H) 09/01/2022     09/01/2022

## 2022-09-02 ENCOUNTER — SPECIALTY PHARMACY (OUTPATIENT)
Dept: PHARMACY | Facility: HOSPITAL | Age: 62
End: 2022-09-02

## 2022-09-02 NOTE — PROGRESS NOTES
MTM Encounter-Re: Adherence and side effects (Verzenio)    Today's encounter was conducted via telephone.    Medication:  Verzenio 100mg by mouth twice daily  - Reason for outreach: Routine medication check-in .  - Administration: Patient is taking every day at the same time .  - Missed doses: Patient reports missing 0 doses in the last 30 days.  - Self-administration: Patient demonstrates ability to self-administer medication. No barriers to adherence identified.   - Diagnosis/Indication: Breast cancer. Progress toward achieving therapeutic goals reviewed.   - Patient denies side effects.    - Medication availability/affordability: Patient has had no issues obtaining medication from pharmacy.   - Questions/concerns about medications: None       Completed medication reconciliation today to assess for drug interactions.   Reviewed allergies, medical history, labs, quality of life, and medication history with patient.   Patient denies starting or stopping any medications.  Assessed medication list for interactions, no significant drug interactions noted.   Advised pt to call the clinic if any new medications are started so we can assess for drug-drug interactions.     All questions addressed. Patient had no additional concerns for MTM office.     Kymberly Mclaughlin, Pharmacy Intern  9/2/2022  11:54 EDT

## 2022-09-07 ENCOUNTER — LAB (OUTPATIENT)
Dept: LAB | Facility: HOSPITAL | Age: 62
End: 2022-09-07

## 2022-09-07 ENCOUNTER — CLINICAL SUPPORT (OUTPATIENT)
Dept: ONCOLOGY | Facility: HOSPITAL | Age: 62
End: 2022-09-07

## 2022-09-07 DIAGNOSIS — C50.919 METASTATIC BREAST CANCER: ICD-10-CM

## 2022-09-07 LAB
BASOPHILS # BLD AUTO: 0.06 10*3/MM3 (ref 0–0.2)
BASOPHILS NFR BLD AUTO: 1.5 % (ref 0–1.5)
DEPRECATED RDW RBC AUTO: 57.9 FL (ref 37–54)
EOSINOPHIL # BLD AUTO: 0.4 10*3/MM3 (ref 0–0.4)
EOSINOPHIL NFR BLD AUTO: 9.8 % (ref 0.3–6.2)
ERYTHROCYTE [DISTWIDTH] IN BLOOD BY AUTOMATED COUNT: 16.3 % (ref 12.3–15.4)
HCT VFR BLD AUTO: 30.6 % (ref 34–46.6)
HGB BLD-MCNC: 9.6 G/DL (ref 12–15.9)
IMM GRANULOCYTES # BLD AUTO: 0.04 10*3/MM3 (ref 0–0.05)
IMM GRANULOCYTES NFR BLD AUTO: 1 % (ref 0–0.5)
LYMPHOCYTES # BLD AUTO: 0.86 10*3/MM3 (ref 0.7–3.1)
LYMPHOCYTES NFR BLD AUTO: 21.1 % (ref 19.6–45.3)
MCH RBC QN AUTO: 30.1 PG (ref 26.6–33)
MCHC RBC AUTO-ENTMCNC: 31.4 G/DL (ref 31.5–35.7)
MCV RBC AUTO: 95.9 FL (ref 79–97)
MONOCYTES # BLD AUTO: 0.39 10*3/MM3 (ref 0.1–0.9)
MONOCYTES NFR BLD AUTO: 9.6 % (ref 5–12)
NEUTROPHILS NFR BLD AUTO: 2.32 10*3/MM3 (ref 1.7–7)
NEUTROPHILS NFR BLD AUTO: 57 % (ref 42.7–76)
NRBC BLD AUTO-RTO: 0 /100 WBC (ref 0–0.2)
PLATELET # BLD AUTO: 155 10*3/MM3 (ref 140–450)
PMV BLD AUTO: 9 FL (ref 6–12)
RBC # BLD AUTO: 3.19 10*6/MM3 (ref 3.77–5.28)
WBC NRBC COR # BLD: 4.07 10*3/MM3 (ref 3.4–10.8)

## 2022-09-07 PROCEDURE — 85025 COMPLETE CBC W/AUTO DIFF WBC: CPT

## 2022-09-07 PROCEDURE — G0463 HOSPITAL OUTPT CLINIC VISIT: HCPCS

## 2022-09-07 PROCEDURE — 36415 COLL VENOUS BLD VENIPUNCTURE: CPT

## 2022-09-07 NOTE — PROGRESS NOTES
Pt present for visit. Voices no new concerns. Confirms taking Verzenio 100 mg twice daily with no issues. Pt does not require transfusion support. No significant changes in counts. Offered copy of labs and discussed schedule. Pt v/u.    Lab Results   Component Value Date    WBC 4.07 09/07/2022    HGB 9.6 (L) 09/07/2022    HCT 30.6 (L) 09/07/2022    MCV 95.9 09/07/2022     09/07/2022

## 2022-09-08 ENCOUNTER — APPOINTMENT (OUTPATIENT)
Dept: ONCOLOGY | Facility: HOSPITAL | Age: 62
End: 2022-09-08

## 2022-09-08 ENCOUNTER — APPOINTMENT (OUTPATIENT)
Dept: LAB | Facility: HOSPITAL | Age: 62
End: 2022-09-08

## 2022-09-08 ENCOUNTER — HOSPITAL ENCOUNTER (OUTPATIENT)
Dept: CT IMAGING | Facility: HOSPITAL | Age: 62
Discharge: HOME | End: 2022-09-08
Admitting: INTERNAL MEDICINE

## 2022-09-08 VITALS — HEART RATE: 66 BPM

## 2022-09-08 DIAGNOSIS — I35.0 AORTIC STENOSIS, SEVERE: ICD-10-CM

## 2022-09-08 LAB — CREAT BLDA-MCNC: 4.5 MG/DL (ref 0.6–1.3)

## 2022-09-08 PROCEDURE — 74174 CTA ABD&PLVS W/CONTRAST: CPT

## 2022-09-08 PROCEDURE — 71275 CT ANGIOGRAPHY CHEST: CPT

## 2022-09-08 PROCEDURE — 0 IOPAMIDOL PER 1 ML: Performed by: INTERNAL MEDICINE

## 2022-09-08 PROCEDURE — 82565 ASSAY OF CREATININE: CPT

## 2022-09-08 RX ADMIN — IOPAMIDOL 95 ML: 755 INJECTION, SOLUTION INTRAVENOUS at 09:33

## 2022-09-15 ENCOUNTER — LAB (OUTPATIENT)
Dept: LAB | Facility: HOSPITAL | Age: 62
End: 2022-09-15

## 2022-09-15 ENCOUNTER — INFUSION (OUTPATIENT)
Dept: ONCOLOGY | Facility: HOSPITAL | Age: 62
End: 2022-09-15

## 2022-09-15 ENCOUNTER — OFFICE VISIT (OUTPATIENT)
Dept: ONCOLOGY | Facility: CLINIC | Age: 62
End: 2022-09-15

## 2022-09-15 VITALS
DIASTOLIC BLOOD PRESSURE: 52 MMHG | HEIGHT: 67 IN | SYSTOLIC BLOOD PRESSURE: 91 MMHG | BODY MASS INDEX: 45.99 KG/M2 | RESPIRATION RATE: 18 BRPM | WEIGHT: 293 LBS | OXYGEN SATURATION: 92 % | HEART RATE: 90 BPM | TEMPERATURE: 97.7 F

## 2022-09-15 DIAGNOSIS — Z79.899 HIGH RISK MEDICATION USE: ICD-10-CM

## 2022-09-15 DIAGNOSIS — C50.919 METASTATIC BREAST CANCER: Primary | ICD-10-CM

## 2022-09-15 DIAGNOSIS — D63.1 ANEMIA DUE TO CHRONIC KIDNEY DISEASE, ON CHRONIC DIALYSIS: ICD-10-CM

## 2022-09-15 DIAGNOSIS — Z99.2 ANEMIA DUE TO CHRONIC KIDNEY DISEASE, ON CHRONIC DIALYSIS: ICD-10-CM

## 2022-09-15 DIAGNOSIS — D63.8 ANEMIA, CHRONIC DISEASE: ICD-10-CM

## 2022-09-15 DIAGNOSIS — N18.6 ESRD (END STAGE RENAL DISEASE): ICD-10-CM

## 2022-09-15 DIAGNOSIS — C50.919 METASTATIC BREAST CANCER: ICD-10-CM

## 2022-09-15 DIAGNOSIS — N18.6 ANEMIA DUE TO CHRONIC KIDNEY DISEASE, ON CHRONIC DIALYSIS: ICD-10-CM

## 2022-09-15 LAB
ALBUMIN SERPL-MCNC: 3.4 G/DL (ref 3.5–5.2)
ALBUMIN/GLOB SERPL: 0.8 G/DL (ref 1.1–2.4)
ALP SERPL-CCNC: 73 U/L (ref 38–116)
ALT SERPL W P-5'-P-CCNC: <5 U/L (ref 0–33)
ANION GAP SERPL CALCULATED.3IONS-SCNC: 11.7 MMOL/L (ref 5–15)
AST SERPL-CCNC: 10 U/L (ref 0–32)
BASOPHILS # BLD AUTO: 0.06 10*3/MM3 (ref 0–0.2)
BASOPHILS NFR BLD AUTO: 1.4 % (ref 0–1.5)
BILIRUB SERPL-MCNC: 0.4 MG/DL (ref 0.2–1.2)
BUN SERPL-MCNC: 16 MG/DL (ref 6–20)
BUN/CREAT SERPL: 4.4 (ref 7.3–30)
CALCIUM SPEC-SCNC: 9.9 MG/DL (ref 8.5–10.2)
CHLORIDE SERPL-SCNC: 98 MMOL/L (ref 98–107)
CO2 SERPL-SCNC: 28.3 MMOL/L (ref 22–29)
CREAT SERPL-MCNC: 3.64 MG/DL (ref 0.6–1.1)
DEPRECATED RDW RBC AUTO: 57.5 FL (ref 37–54)
EGFRCR SERPLBLD CKD-EPI 2021: 13.5 ML/MIN/1.73
EOSINOPHIL # BLD AUTO: 0.34 10*3/MM3 (ref 0–0.4)
EOSINOPHIL NFR BLD AUTO: 7.8 % (ref 0.3–6.2)
ERYTHROCYTE [DISTWIDTH] IN BLOOD BY AUTOMATED COUNT: 16.1 % (ref 12.3–15.4)
GLOBULIN UR ELPH-MCNC: 4.1 GM/DL (ref 1.8–3.5)
GLUCOSE SERPL-MCNC: 193 MG/DL (ref 74–124)
HCT VFR BLD AUTO: 28.9 % (ref 34–46.6)
HGB BLD-MCNC: 8.8 G/DL (ref 12–15.9)
IMM GRANULOCYTES # BLD AUTO: 0.03 10*3/MM3 (ref 0–0.05)
IMM GRANULOCYTES NFR BLD AUTO: 0.7 % (ref 0–0.5)
LYMPHOCYTES # BLD AUTO: 0.72 10*3/MM3 (ref 0.7–3.1)
LYMPHOCYTES NFR BLD AUTO: 16.6 % (ref 19.6–45.3)
MCH RBC QN AUTO: 29.7 PG (ref 26.6–33)
MCHC RBC AUTO-ENTMCNC: 30.4 G/DL (ref 31.5–35.7)
MCV RBC AUTO: 97.6 FL (ref 79–97)
MONOCYTES # BLD AUTO: 0.3 10*3/MM3 (ref 0.1–0.9)
MONOCYTES NFR BLD AUTO: 6.9 % (ref 5–12)
NEUTROPHILS NFR BLD AUTO: 2.9 10*3/MM3 (ref 1.7–7)
NEUTROPHILS NFR BLD AUTO: 66.6 % (ref 42.7–76)
NRBC BLD AUTO-RTO: 0 /100 WBC (ref 0–0.2)
PLATELET # BLD AUTO: 170 10*3/MM3 (ref 140–450)
PMV BLD AUTO: 8.6 FL (ref 6–12)
POTASSIUM SERPL-SCNC: 3.4 MMOL/L (ref 3.5–4.7)
PROT SERPL-MCNC: 7.5 G/DL (ref 6.3–8)
RBC # BLD AUTO: 2.96 10*6/MM3 (ref 3.77–5.28)
SODIUM SERPL-SCNC: 138 MMOL/L (ref 134–145)
WBC NRBC COR # BLD: 4.35 10*3/MM3 (ref 3.4–10.8)

## 2022-09-15 PROCEDURE — 99214 OFFICE O/P EST MOD 30 MIN: CPT | Performed by: NURSE PRACTITIONER

## 2022-09-15 PROCEDURE — 25010000002 FULVESTRANT PER 25 MG: Performed by: NURSE PRACTITIONER

## 2022-09-15 PROCEDURE — 36415 COLL VENOUS BLD VENIPUNCTURE: CPT

## 2022-09-15 PROCEDURE — 96402 CHEMO HORMON ANTINEOPL SQ/IM: CPT

## 2022-09-15 PROCEDURE — 85025 COMPLETE CBC W/AUTO DIFF WBC: CPT

## 2022-09-15 PROCEDURE — 80053 COMPREHEN METABOLIC PANEL: CPT

## 2022-09-15 RX ADMIN — FULVESTRANT 500 MG: 250 INJECTION INTRAMUSCULAR at 15:59

## 2022-09-16 NOTE — PROGRESS NOTES
"Chief Complaint  Metastatic lobular breast cancer (ER/CA positive, HER-2/tj negative), anemia secondary to CKD3, CHF, peripheral neuropathy, chemotherapy related nausea chemotherapy-induced myelosuppression    Subjective        History of Present Illness   The patient is a 61 y.o. female with the above in history, who returns to the office today for 3-week follow-up in anticipation of her Faslodex injection.  She currently continues on Verzenio 100 mg twice daily.  She reports overall fair tolerance to the increased dose.  Since increasing to 100 mg twice a day, she reports worsening diarrhea.  She is utilizing Imodium 2-3 times daily which is beneficial.  She otherwise is feeling well.  She will begin home dialysis next week.  She has 1 additional day of training tomorrow, 9/16/2022.  She continues to receive her Mircera through nephrology.  She denies chest pain or worsening shortness of breath.  She denies signs or symptoms of bleeding.  She reports her stool is normal in appearance.  She has fatigue though feels this is at her baseline.    Objective   Vital Signs:   BP 91/52   Pulse 90   Temp 97.7 °F (36.5 °C) (Temporal)   Resp 18   Ht 170.2 cm (67\") Comment: pt stated HT  Wt (!) 165 kg (363 lb) Comment: pt stated WT  SpO2 92%   BMI 56.85 kg/m²       Physical Exam  Constitutional:       Appearance: She is well-developed. She is obese.      Comments: Patient is in a motorized scooter today   Eyes:      Conjunctiva/sclera: Conjunctivae normal.   Neck:      Thyroid: No thyromegaly.   Cardiovascular:      Rate and Rhythm: Normal rate and regular rhythm.      Heart sounds: Murmur heard.     No friction rub. No gallop.      Comments: 2/6 murmur  Pulmonary:      Effort: No respiratory distress.      Breath sounds: Normal breath sounds.      Comments: Dialysis access in place in the right subclavian position  Abdominal:      General: Bowel sounds are normal.      Palpations: Abdomen is soft.   Musculoskeletal:   "       General: Swelling present.      Comments: 1+ bilateral lower extremity edema with venous stasis changes noted.   Skin:     General: Skin is warm and dry.      Findings: No bruising or rash.   Neurological:      Mental Status: She is alert and oriented to person, place, and time.      Cranial Nerves: No cranial nerve deficit.      Motor: No abnormal muscle tone.      Deep Tendon Reflexes: Reflexes normal.   Psychiatric:         Behavior: Behavior normal.        Patient was examined today, unchanged from above    Result Review :   Results from last 7 days   Lab Units 09/15/22  1508   WBC 10*3/mm3 4.35   NEUTROS ABS 10*3/mm3 2.90   HEMOGLOBIN g/dL 8.8*   HEMATOCRIT % 28.9*   PLATELETS 10*3/mm3 170     Results from last 7 days   Lab Units 09/15/22  1508   SODIUM mmol/L 138   POTASSIUM mmol/L 3.4*   CHLORIDE mmol/L 98   CO2 mmol/L 28.3   BUN mg/dL 16   CREATININE mg/dL 3.64*   CALCIUM mg/dL 9.9   ALBUMIN g/dL 3.40*   BILIRUBIN mg/dL 0.4   ALK PHOS U/L 73   ALT (SGPT) U/L <5   AST (SGOT) U/L 10   GLUCOSE mg/dL 193*                Assessment and Plan    *Metastatic lobular breast cancer (ER/NC positive, HER-2/tj negative):  · Previous stage IIIC right breast cancer in 2003, received neoadjuvant chemotherapy (unknown regimen) with subsequent bilateral mastectomies 1/22/2004 with right axillary dissection.  Residual 10-12 cm invasive lobular carcinoma on the right breast with 14/16+ nodes with extranodal extension.  Received adjuvant carboplatin and Navelbine chemotherapy x4 cycles  · Attempted adjuvant radiation to the chest wall however interrupted due to cellulitis that required removal of implants in August 2004  · Received tamoxifen from 6/22/2004 until June 2009.  Transitioned to Femara and received until June 2014  · Identification of CT findings concerning for carcinomatosis on CT scans and June 2019.  · PET scan confirmed hypermetabolic activity in the omentum as well as small retroperitoneal lymph  nodes.  · CT-guided omental biopsy 7/30/2019 with metastatic lobular carcinoma of breast primary, ER positive (greater than 95%), MN positive (90%), HER-2/tj negative (1+ IHC)  · [facility] liquid biopsy 2/5/2020: MSI undetermined, mutations in BETH, NF1, CHEK2.  No evidence of PIK3CA mutation.  · Subsequent [facility] germline testing 11/12/2021 with BETH VUS (c.2864C>A) and POLE VUS (c.631A>T)  · Initiation of Aromasin and Ibrance in August 2019  · Difficulty with myelosuppression related to Ibrance requiring dose alterations, eventually changed to 100 mg for 7 days on followed by 7 days off.  · CT chest abdomen pelvis 10/26/2021 with increase in left hydronephrosis with increase in left perinephric and periureteral stranding. Decrease in stone in the left kidney lower pole (7 mm).  Stable small retroperitoneal lymph and left periaortic lymph nodes.  · PET scan 11/10/2021 with multiple bilateral hypermetabolic nodular pulmonary lesions, asymmetric moderate to severe left hydronephrosis with ill-defined fat stranding and soft tissue thickening in the renal sinus and surrounding the left ureter, hypermetabolic left retroperitoneal lymph node with slight increase in size, ill-defined hypermetabolic thickening in the inferior omentum with increase in size, uptake and C7 (indeterminate, recommended MRI).  Short segments of uptake in the mid and distal esophagus possibly related to gastritis.  · PET scan findings felt to be consistent with progressive malignancy.  Patient declined repeat omental biopsy.   · Options for further treatment discussed on 11/12/2021.  In light of renal failure and dialysis, options are more limited.  Recommendation to continue Ibrance and discontinue Aromasin, begin Faslodex.  Discussed possible future use of single agent Taxol.    · Aromasin discontinued 11/12/2021  · On 11/22/2021 initiation of Faslodex with continuation of Ibrance (100 mg daily for 7 days on followed by 7 days  off).  · MRI cervical spine 11/18/2021 showed the right C7 normality to likely represent metastatic disease.  With solitary bone lesion, did not recommend pursuit of bone modifying agent.  · Following 2 cycles Faslodex/Ibrance, PET scan on 1/11/2022 showed no change in the soft tissue superior to the dome of the bladder with hypermetabolic activity (likely related to carcinomatosis).  Significant decrease in injection of fat around the left renal pelvis and stable retroperitoneal hypermetabolic lymph nodes, decrease in size and activity in small bilateral pulmonary nodules.  Findings felt to represent evidence of response to treatment.    · Ibrance held briefly beginning 1/28/2022 due to hospitalization with COVID-19 infection and C. difficile colitis.  Patient developed leukopenia/neutropenia.  Ibrance resumed at previous dosing (100 mg daily 7 days on followed by 7 days off) on 2/9/22.  · Following 5 cycles Faslodex/Ibrance PET scan 4/19/2022 with evidence of mixed response.  New hypermetabolic lesion spinous process L2 (SUV 5.1) and slight increase in activity left clavicular metastasis (SUV increased 4.6-8.1).  C7 hypermetabolic mixed lytic and blastic bone lesion was unchanged.  Decrease in uptake involving omental thickening.  Stable soft tissue thickening around the left renal pelvis and ureter.  Discussion again regarding potential pursuit of IV chemotherapy with Taxol versus continuation of Ibrance and Faslodex with radiation to sites of bony disease at L2, left clavicle, C7.  Patient opted to defer initiation of systemic chemotherapy and continue Ibrance/Faslodex with consideration of radiation.  · Initiated monthly Xgeva on 5/19/2022 (cleared with nephrology).  Xgeva subsequently held due to significant hypocalcemia.  Decision made to forego further Xgeva with persistent hypocalcemia.  · Palliative radiation received to lumbar spine regarding L2 metastasis 5/23- 6/7/2022  · Ibrance held during radiation  therapy beginning 5/22/2022.  Ibrance resumed 6/16/2022.  · PET scan 7/21/2022 with stable hypermetabolic abdominal pelvic lymph nodes and subcarinal lymph node, stable bone lesions at C7, left clavicle.  Stable soft tissue thickening around the left renal pelvis with left hydronephrosis.  Uptake in adrenal glands felt to be likely reactive (no change in size).  No activity noted at L2 (previously radiated).  New hypermetabolic lesion right anterior sixth rib.  · With evidence of further slight progression in bone on PET scan (new right sixth rib lesion), on 7/28/2022 discontinued Ibrance and plan to transition to abemaciclib with continuation of Faslodex.    · On 8/4/2022 initiated abemaciclib at reduced dose of 50 mg twice daily.  · On 8/25/2022, increased abemaciclib dose to 100 mg twice daily  · She returns the office today, 9/15/2022 in anticipation of cycle 11 Faslodex.  She currently continues on Verzenio 100 mg twice daily.  This was originally initiated 8/4/2022 after discontinuation of previous Ibrance.  This was initiated a dose reduction of 50 mg twice daily and escalated to 100 mg twice daily.  Her counts remain stable today including her hemoglobin with previous anemia.  We will space out the frequency of her lab visits, she will return in 2 weeks for CBC with RN review.  PET scan in 3 weeks.  MD follow-up in 4 weeks to review PET scan and proceed with cycle 12 Faslodex.  We did discuss potential increase of Verzenio to 150 mg twice daily, though the patient is reluctant given her current diarrhea and concerns for further toxicity with dose escalation.    *Anemia secondary to ESRD, underlying malignancy/chronic disease, myelosuppression from CDK 4/6 inhibitor, GI blood loss:  · Anemia secondary to ESRD, malignancy with exacerbation by use of Ibrance  · Previous evidence of folate deficiency 8/12/2019 with level 3.84, initiated folic acid 1 mg daily  · Previous evidence of iron deficiency, received  Injectafer on 8/7/2020 750 mg IV x1 dose.  Second dose not administered due to onset of fever, subsequent iron studies precluded further administration of IV iron.  · Previous low normal B12 level initiated oral B12 1000 mcg daily.  · Patient had previously received Procrit and required intermittent transfusion support  · Following initiation of hemodialysis, management of BEAU transitioned to nephrology, receiving Epogen with dialysis.  · Ongoing transfusion support prompted alteration in Ibrance dosing on 8/23/2021.  · After alteration in Ibrance dosing, hemoglobin improved and remained stable in the 9-10 range.  · Improved but ongoing intermittent need for transfusion support despite Ibrance dose alteration  · Stool negative for occult blood x3 on 11/2/2021  · Patient with reduced dialysis frequency to 2 days/week with concurrent decrease in Epogen dosing corresponding again to increased transfusion requirement.  · Epogen dose increased per nephrology to 20,000 units twice weekly with dialysis on Mondays and Fridays  · Ibrance again exacerbating anemia with ongoing intermittent transfusion requirements.  Ibrance subsequently discontinued on 7/28/2022 and patient initiated abemaciclib on 8/4/2022 which may be less myelosuppressive.  · Additional transfusions required with hemoglobin on 3/31/2022 6.6, hemoglobin 4/21/2022 of 6.4, hemoglobin on 5/5/2022 of 6.8, hemoglobin 6.4 on 7/14/2022, hemoglobin 6.4 on 7/28/2022, hemoglobin 6.8 on 8/18/2022.  · Patient did develop transient visible blood in stool in July 2022  · Anemia evaluation 7/28/2022 with iron 32, ferritin 412, iron saturation 15%, TIBC 210, folate 19.9, B12 925, haptoglobin 300, .  · Stool positive for occult blood x3 on 8/1/2022  · Patient was seen by GI on 8/16/2022, felt to be high risk for endoscopic evaluation and elected to forego EGD/colonoscopy for now  · Patient transitioned from hemodialysis in clinic to home hemodialysis 5 days/week x 2  hours beginning 8/29/2022.  With transition to home dialysis, nephrology transition the patient from Epogen to Mircera administered every 4 weeks in their office, initiated 8/22/2022.  · She has multifactorial anemia related to ESRD, underlying malignancy/chronic disease, myelosuppressive effects of CDK 4/6 inhibitor, GI blood loss.  She did undergo recent transfusion support following hemoglobin of 6.8 on 8/18/2022.  She did have stool occult blood positive x3 on 8/1/2022 however is not had any further visible blood in her stool.  She did see GI on 8/16/2022 however it was felt she would be very high risk for endoscopic evaluation and it was elected to defer EGD and colonoscopy at this time.    · She has also transition from Epogen dosing on Mondays and Fridays with dialysis to Mircera dosing every 4 weeks with nephrology as she transitions to a home dialysis device.   · Hemoglobin remained stable today at 8.8.  We will therefore space out her follow-up to every other week rather than weekly.    *ESRD on HD:  · Patient with previous CKD3/4  · Hospitalization 4/29-5/4/2021 with RYAN/CKD, UTI, anemia.  Required initiation of hemodialysis Tuesday Thursday Saturday.   · Decrease in frequency of dialysis to twice per week.  She continues to produce a significant amount of urine.   · Status post left upper extremity AV fistula placement on 11/3/2021 by vascular surgery  · Attempt to hold further dialysis on 1/11/2022 with close monitoring of her laboratory and clinical parameters.  Dialysis quickly resumed due to development of hyperkalemia.  · Patient was undergoing dialysis 2 days/week on Mondays and Fridays.    · On 9/19/2022 patient transitioning to use of home dialysis device 5 days/week x 2 hours.  She has her last day of training tomorrow, 9/16/2022    *CHF with severe aortic stenosis:  · Echocardiogram 7/12/2019 showing ejection fraction 48% with moderate dilation of the LV cavity, global hypokinesis, grade 2  diastolic dysfunction, mild to moderate aortic stenosis, trivial pericardial effusion.  · Echocardiogram 7/19/2021 with ejection fraction 56%, left atrial volume moderately increased, grade 2 diastolic dysfunction, severe calcification aortic valve, moderate aortic stenosis, severe mitral calcification, mild mitral stenosis, marked elevation in RVSP from tricuspid regurgitation.  · Echocardiogram on 7/13/2022 with ejection fraction 56-60%, grade 2 diastolic dysfunction, severe aortic stenosis.    · Cardiac catheterization 8/23/2022 showed normal coronary arteries, mild to moderate pulmonary hypertension.    · She was evaluated by cardiothoracic surgery Dr. Pagan and there was discussion regarding potential candidacy for TAVR although there was concern given her underlying comorbidities including her metastatic breast cancer.  I did discuss patient's prognosis with him today on the telephone.  She does have opportunities for additional treatment of her malignancy with intravenous chemotherapy and is willing to pursue this in the future when needed.  It is unclear as to her expected survival however given her multiple severe comorbidities with metastatic breast cancer, ESRD on dialysis, severe aortic stenosis, severe anemia.  There is consideration of possible pursuit of balloon valvuloplasty initially to assess her symptomatic response and then consider pursuit of TAVR in the future.  We will await further recommendations after she is discussed in their multidisciplinary conference next week.    *Left upper and bilateral lower extremity peripheral neuropathy:  · Patient reports that left upper extremity neuropathy was not present from previous chemotherapy but occurred after hospitalization that required intubation and critical care stay.  Apparently felt to represent critical care neuropathy.  Improved over time.  · Bilateral lower extremity neuropathy felt to be related to diabetes  · May have future implications  regarding treatment for breast cancer.  · Patient reports that peripheral neuropathy symptoms continue in the bilateral lower extremities, unchanged.  This will be a consideration with potential future use of Taxol    *Uterine/endometrial activity on PET scan:  · Patient experienced postmenopausal vaginal bleeding in 2013, negative endometrial biopsy and gynecologic evaluation.  · With findings on PET scan with enlarging uterus and some hypermetabolic activity, referred back to Dr. Oliveros in gynecology.    · 9/19/2019 D&C with hysteroscopy by Dr. Oliveros, no significant intraoperative findings, pathologic results with benign findings, no evidence of malignancy.    *Nausea:  · Mild, relieved with Zofran.   · Patient experienced improvement in nausea after initiating hemodialysis  · No recent nausea    *Myelosuppression secondary to CDK 4/6 inhibitor:  · Patient was able to maintain adequate WBC after dosing alteration on Ibrance to treatment at 100 mg daily for 7 days on followed by 7 days off.  · Subsequent transition from Ibrance to abemaciclib  · Today, WBC 4.35, ANC 2.90    *Depression  · Patient with history of depression, worsened after the death of her son in November 2021  · With evidence of progressive malignancy in November 2021, patient agreeable to referral to supportive oncology  · Patient currently receiving gabapentin 300 mg nightly, Zoloft 150 mg daily, armodafinil 250 mg daily  · Patient does continue with difficulties regarding depression that wax and wane.  Currently symptoms under fair control.  Patient last seen by supportive oncology on 8/4/2022.    *Left perinephric stranding secondary to malignancy with hydronephrosis:  · CT 4/22/2021 showed interval enlargement of 2 staghorn calculi in the left kidney with persistent left perinephric stranding  · Hospitalization 4/29-5/4/2021 with RYAN/CKD, UTI, anemia.  Required 2 unit PRBC transfusion and initiated hemodialysis Tuesday Thursday Saturday.     · Discussion from urology regarding potential need for surgical procedure to address staghorn calculus left kidney  · CT scan 7/2/2021 with only 1 remaining left renal stone identified which had decreased in size.  Patient appears to have passed the other stone.  · As above, CT 10/26/2021 with more prominent left hydronephrosis and increase in left perinephric and periureteral stranding.  Felt to possibly be related to passage of recent stone versus partial obstruction secondary to debris or thrombus in the left ureter versus pyelonephritis.  Patient however with no clinical evidence of recent stone passage nor pyelonephritis. Malignancy felt to be the cause of CT changes around the left kidney at this point.   · Left ureteral stent replacement 12/16/2021  · PET scan 1/11/2022 with decrease in fat injection near left renal pelvis, stent in satisfactory position.  Mild residual hydronephrosis on the left.  · Ureteral stent replaced on 3/10/2022  · PET scan 4/19/2022 with no hydronephrosis, left ureteral stent in place  · PET scan 7/21/2022 with persistent left hydronephrosis, ureteral stent in place    *Right thyroid nodules  · Patient underwent prior thyroid biopsy by her report with benign findings many years ago, records not available  · On MRI cervical spine 11/18/2021, finding of 1.8 cm right thyroid nodule  · Thyroid ultrasound 11/26/2021 with right-sided thyroid nodules, 1 nodule was hypoechoic, solid measuring 2 cm with biopsy recommended.  · Thyroid ultrasound results reviewed with patient.  In light of her metastatic breast cancer, renal failure the significance of the thyroid nodule is felt to be much less.  We discussed possibility of thyroid biopsy however patient is inclined to forego this, especially since she has had a biopsy performed in the past with benign findings by her report.    · No hypermetabolic activity identified on PET scan 1/11/2022 in the neck    *Hospitalization 1/28-1/30/2022 due to  COVID-19 infection and C. difficile colitis  · Patient fully vaccinated with 3 doses Moderna COVID-19 vaccine  · Patient developed symptoms 1/26/2022 with abrupt onset sinus congestion, mild cough, subsequently developed nausea and diarrhea on 1/27/2022.  Significant fatigue.  · Patient tested positive in emergency department on 1/28/2022 and was admitted  · Patient maintained O2 saturation in mid 90% range on room air  · Patient received remdesivir  · Patient was also found to be positive for C. difficile colitis, received course of oral vancomycin.  · Symptoms from both COVID-19 and C. difficile colitis ultimately resolved.    *Hypocalcemia  · Patient developed significant hypocalcemia after receiving Xgeva on 5/19/2022  · Calcium management per nephrology  · No further Xgeva planned due to persistent significant hypocalcemia  · Calcium today 9.9    Plan:  1. Patient is continuing on Faslodex 500 mg IM injection every 28 days, proceed today with cycle 11.  2. Continue abemaciclib 100 mg twice daily.  We did discuss today potential increase to 150 mg twice daily though the patient declines due to diarrhea.  3. Continue Imodium as needed.  This is controlling her symptoms currently.  4. Continue oral folic acid 1 mg daily, B12 1000 mcg daily.  5. The patient is transitioning from hemodialysis on Mondays and Fridays to home dialysis device 5 days/week x 2 hours beginning 9/19/2022  6. Patient has transitioned from Epogen dosing with dialysis to Mircera dosing every 4 weeks per nephrology.  First Mircera dose administered 8/22/2022.  7. Patient is continuing follow-up with cardiology and cardiothoracic surgery regarding management of severe aortic stenosis.  Await recommendations from multidisciplinary conference next week regarding possible TAVR versus initial balloon valvuloplasty.  8. Continue to monitor hemoglobin closely regarding ongoing transfusion needs for hemoglobin less than 7.0.  9. In  2 weeks CBC and RN  review  10. In 3 weeks PET scan  11. In 4 weeks MD visit with CBC, CMP and cycle 12 Faslodex.    Patient continues on high risk medication requiring intensive monitoring.    Annabelle Atwood, APRN  09/15/2022

## 2022-09-22 ENCOUNTER — APPOINTMENT (OUTPATIENT)
Dept: LAB | Facility: HOSPITAL | Age: 62
End: 2022-09-22

## 2022-09-22 ENCOUNTER — APPOINTMENT (OUTPATIENT)
Dept: ONCOLOGY | Facility: HOSPITAL | Age: 62
End: 2022-09-22

## 2022-09-22 RX ORDER — CARVEDILOL 3.12 MG/1
TABLET ORAL
Qty: 180 TABLET | Refills: 1 | Status: SHIPPED | OUTPATIENT
Start: 2022-09-22 | End: 2023-01-06 | Stop reason: HOSPADM

## 2022-09-26 ENCOUNTER — OFFICE VISIT (OUTPATIENT)
Dept: PSYCHIATRY | Facility: HOSPITAL | Age: 62
End: 2022-09-26

## 2022-09-26 DIAGNOSIS — R53.83 FATIGUE, UNSPECIFIED TYPE: ICD-10-CM

## 2022-09-26 DIAGNOSIS — F41.1 GENERALIZED ANXIETY DISORDER: ICD-10-CM

## 2022-09-26 DIAGNOSIS — F41.9 ANXIETY AND DEPRESSION: Primary | ICD-10-CM

## 2022-09-26 DIAGNOSIS — F32.A ANXIETY AND DEPRESSION: Primary | ICD-10-CM

## 2022-09-26 DIAGNOSIS — F33.41 RECURRENT MAJOR DEPRESSIVE DISORDER, IN PARTIAL REMISSION: ICD-10-CM

## 2022-09-26 PROCEDURE — 99212 OFFICE O/P EST SF 10 MIN: CPT | Performed by: PSYCHIATRY & NEUROLOGY

## 2022-09-26 PROCEDURE — 90853 GROUP PSYCHOTHERAPY: CPT | Performed by: PSYCHIATRY & NEUROLOGY

## 2022-09-26 NOTE — PROGRESS NOTES
Subjective  Patient ID: Juana Hall is a 61 y.o.. female seen in regularly scheduled group session.  Group participants have consented to group conducted via video through Zoom.  Telehealth provided in patient's home.  Location of provider: [facility]    Total Group Time, Face to Face via Zoom: 75 minutes  Total Group Participants: 6, plus facilitation per Dr. Mary Vazquez    Group Therapy  Group topics explored including development of new normal, short and long term effects of diagnosis and treatment, anticipitory anxiety, pain management, physical deconditioning, expectation of return to previous normal, anxiety surrounding adjusted treatment plan and anxiety surrounding completion of treatment or reduced frequency of follow up visits.    Counseling provided regarding reintroduction to activity through graded tasks, recognition and allowance of need for rest, importance of continued follow up , early and late treatment effects with potential strategies for managing persistent symptoms, behavioral activation, medication education, anticipatory anxiety, fears of recurrence and effective communication strategies.    Discussed current programming available in [facility] and community.    Patient response to group: Pt very positive and interactive, avoided turning camera on until finished home dialysis Pt exhibted wit and sense of humor    Medications Management  Dr. Mary Vazquez present for medication discussion and management.  Pt continues in survivorship of metastatic breast cancer and ESRD  Has metastatic disease to bone has undergone radiation, no significant pain..    Constitutional Exam: Pt is alert, oriented, and engaged in conversation; affect and mood ...  CC: managing anxiety and prognosis  HPI:  Pt with complex chronic and serious medical illness  Psychological ROS: positive for - anxiety, concentration difficulties and sleep disturbances  Current medication regimen,  inclusive of armodafanil, zoloft, gabapentin pt had been uncertain about seroquel and not taking discussed indications and given metabolic risk factors have taken of medication list.     Diagnoses and all orders for this visit:    1. Anxiety and depression (Primary)    2. Fatigue, unspecified type    3. Generalized anxiety disorder    4. Recurrent major depressive disorder, in partial remission (HCC)      Discontinue seroquel, continue other meds as ordered RTC 6 weeks.

## 2022-09-29 ENCOUNTER — CLINICAL SUPPORT (OUTPATIENT)
Dept: ONCOLOGY | Facility: HOSPITAL | Age: 62
End: 2022-09-29

## 2022-09-29 ENCOUNTER — LAB (OUTPATIENT)
Dept: LAB | Facility: HOSPITAL | Age: 62
End: 2022-09-29

## 2022-09-29 DIAGNOSIS — C50.919 METASTATIC BREAST CANCER: ICD-10-CM

## 2022-09-29 LAB
BASOPHILS # BLD AUTO: 0.05 10*3/MM3 (ref 0–0.2)
BASOPHILS NFR BLD AUTO: 1.3 % (ref 0–1.5)
DEPRECATED RDW RBC AUTO: 62.4 FL (ref 37–54)
EOSINOPHIL # BLD AUTO: 0.42 10*3/MM3 (ref 0–0.4)
EOSINOPHIL NFR BLD AUTO: 10.7 % (ref 0.3–6.2)
ERYTHROCYTE [DISTWIDTH] IN BLOOD BY AUTOMATED COUNT: 17.9 % (ref 12.3–15.4)
HCT VFR BLD AUTO: 28.6 % (ref 34–46.6)
HGB BLD-MCNC: 8.9 G/DL (ref 12–15.9)
IMM GRANULOCYTES # BLD AUTO: 0.03 10*3/MM3 (ref 0–0.05)
IMM GRANULOCYTES NFR BLD AUTO: 0.8 % (ref 0–0.5)
LYMPHOCYTES # BLD AUTO: 0.96 10*3/MM3 (ref 0.7–3.1)
LYMPHOCYTES NFR BLD AUTO: 24.4 % (ref 19.6–45.3)
MCH RBC QN AUTO: 30 PG (ref 26.6–33)
MCHC RBC AUTO-ENTMCNC: 31.1 G/DL (ref 31.5–35.7)
MCV RBC AUTO: 96.3 FL (ref 79–97)
MONOCYTES # BLD AUTO: 0.35 10*3/MM3 (ref 0.1–0.9)
MONOCYTES NFR BLD AUTO: 8.9 % (ref 5–12)
NEUTROPHILS NFR BLD AUTO: 2.13 10*3/MM3 (ref 1.7–7)
NEUTROPHILS NFR BLD AUTO: 53.9 % (ref 42.7–76)
NRBC BLD AUTO-RTO: 0 /100 WBC (ref 0–0.2)
PLATELET # BLD AUTO: 146 10*3/MM3 (ref 140–450)
PMV BLD AUTO: 9.1 FL (ref 6–12)
RBC # BLD AUTO: 2.97 10*6/MM3 (ref 3.77–5.28)
WBC NRBC COR # BLD: 3.94 10*3/MM3 (ref 3.4–10.8)

## 2022-09-29 PROCEDURE — 85025 COMPLETE CBC W/AUTO DIFF WBC: CPT

## 2022-09-29 PROCEDURE — 36415 COLL VENOUS BLD VENIPUNCTURE: CPT

## 2022-09-29 PROCEDURE — G0463 HOSPITAL OUTPT CLINIC VISIT: HCPCS

## 2022-09-29 NOTE — NURSING NOTE
Pt here for CBC and RN review. CBC reviewed with pt. Counts are stable for this pt at this time. Pt confirms taking verzenio, and states her only issue is diarrhea, but takes imodium which helps with side effects. Pt wondering if she can cancel her appt next week for lab and RN review since her counts have been stable the past few weeks. Told pt I would r/w Dr. Irvin and will call pt if he is agreeable. Pt v/u. Copy of labs given to pt and pt v/u to call with any new concerns.       Lab Results   Component Value Date    WBC 3.94 09/29/2022    HGB 8.9 (L) 09/29/2022    HCT 28.6 (L) 09/29/2022    MCV 96.3 09/29/2022     09/29/2022   '

## 2022-10-06 ENCOUNTER — APPOINTMENT (OUTPATIENT)
Dept: ONCOLOGY | Facility: HOSPITAL | Age: 62
End: 2022-10-06

## 2022-10-06 ENCOUNTER — HOSPITAL ENCOUNTER (OUTPATIENT)
Dept: PET IMAGING | Facility: HOSPITAL | Age: 62
Discharge: HOME | End: 2022-10-06

## 2022-10-06 ENCOUNTER — HOSPITAL ENCOUNTER (OUTPATIENT)
Dept: PET IMAGING | Facility: HOSPITAL | Age: 62
Discharge: HOME | End: 2022-10-06
Admitting: INTERNAL MEDICINE

## 2022-10-06 ENCOUNTER — APPOINTMENT (OUTPATIENT)
Dept: LAB | Facility: HOSPITAL | Age: 62
End: 2022-10-06

## 2022-10-06 DIAGNOSIS — C50.919 METASTATIC BREAST CANCER: ICD-10-CM

## 2022-10-06 LAB — GLUCOSE BLDC GLUCOMTR-MCNC: 106 MG/DL (ref 70–130)

## 2022-10-06 PROCEDURE — 78815 PET IMAGE W/CT SKULL-THIGH: CPT

## 2022-10-06 PROCEDURE — A9552 F18 FDG: HCPCS | Performed by: INTERNAL MEDICINE

## 2022-10-06 PROCEDURE — 82962 GLUCOSE BLOOD TEST: CPT

## 2022-10-06 PROCEDURE — 0 FLUDEOXYGLUCOSE F18 SOLUTION: Performed by: INTERNAL MEDICINE

## 2022-10-06 RX ADMIN — FLUDEOXYGLUCOSE F18 1 DOSE: 300 INJECTION INTRAVENOUS at 08:26

## 2022-10-12 NOTE — PROGRESS NOTES
"Chief Complaint  Metastatic lobular breast cancer (ER/CT positive, HER-2/tj negative), anemia secondary to CKD3, CHF, peripheral neuropathy, chemotherapy related nausea chemotherapy-induced myelosuppression    Subjective        History of Present Illness  The patient returns today in follow-up continuing on monthly Faslodex due for cycle 12 today and continuing on abemaciclib 100 mg twice daily.  Patient has laboratory studies and PET scan to review today.  She had experienced some difficulty with diarrhea on the 100 mg twice daily dose and was reluctant previously to increase further up to 150 mg twice daily dosing.  Fortunately, since her last visit, the patient reports that her diarrhea has subsided and she has not required any Imodium in the past 2 to 3 days.  She is now willing to increase her dose if needed.  She is continuing now on home dialysis 5 days/week.  Her  is able to manage this reasonably well.  She is continuing on monthly Mircera with nephrology and is due on 10/18 for her next dose.  She notes some ongoing chronic fatigue but no other significant issues recently.  She has not had any further discussions with cardiology regarding her aortic stenosis and is scheduled to be seen on 10/25/2022.      Objective   Vital Signs:   /61   Pulse 65   Temp 97.3 °F (36.3 °C) (Temporal)   Resp 18   Ht 170.2 cm (67.01\")   Wt (!) 163 kg (359 lb 5.6 oz)   SpO2 96%   BMI 56.27 kg/m²     Physical Exam  Constitutional:       Appearance: She is well-developed. She is obese.      Comments: Patient is in a motorized scooter today   Eyes:      Conjunctiva/sclera: Conjunctivae normal.   Neck:      Thyroid: No thyromegaly.   Cardiovascular:      Rate and Rhythm: Normal rate and regular rhythm.      Heart sounds: Murmur heard.     No friction rub. No gallop.      Comments: 2/6 murmur  Pulmonary:      Effort: No respiratory distress.      Breath sounds: Normal breath sounds.   Abdominal:      General: " Bowel sounds are normal. There is no distension.      Palpations: Abdomen is soft.      Tenderness: There is no abdominal tenderness.   Musculoskeletal:         General: Swelling present.      Comments: 1+ bilateral lower extremity edema with venous stasis changes noted.  Left upper extremity fistula   Lymphadenopathy:      Head:      Right side of head: No submandibular adenopathy.      Cervical: No cervical adenopathy.      Upper Body:      Right upper body: No supraclavicular adenopathy.      Left upper body: No supraclavicular adenopathy.   Skin:     General: Skin is warm and dry.      Findings: No bruising or rash.   Neurological:      Mental Status: She is alert and oriented to person, place, and time.      Cranial Nerves: No cranial nerve deficit.      Motor: No abnormal muscle tone.      Deep Tendon Reflexes: Reflexes normal.   Psychiatric:         Behavior: Behavior normal.          Result Review : Reviewed CBC, CMP from today.  Reviewed PET scan from 10/6/2022       Assessment and Plan    *Metastatic lobular breast cancer (ER/NV positive, HER-2/tj negative):  · Previous stage IIIC right breast cancer in 2003, received neoadjuvant chemotherapy (unknown regimen) with subsequent bilateral mastectomies 1/22/2004 with right axillary dissection.  Residual 10-12 cm invasive lobular carcinoma on the right breast with 14/16+ nodes with extranodal extension.  Received adjuvant carboplatin and Navelbine chemotherapy x4 cycles  · Attempted adjuvant radiation to the chest wall however interrupted due to cellulitis that required removal of implants in August 2004  · Received tamoxifen from 6/22/2004 until June 2009.  Transitioned to Femara and received until June 2014  · Identification of CT findings concerning for carcinomatosis on CT scans and June 2019.  · PET scan confirmed hypermetabolic activity in the omentum as well as small retroperitoneal lymph nodes.  · CT-guided omental biopsy 7/30/2019 with metastatic  lobular carcinoma of breast primary, ER positive (greater than 95%), SC positive (90%), HER-2/tj negative (1+ IHC)  · [facility] liquid biopsy 2/5/2020: MSI undetermined, mutations in BETH, NF1, CHEK2.  No evidence of PIK3CA mutation.  · Subsequent [facility] germline testing 11/12/2021 with BETH VUS (c.2864C>A) and POLE VUS (c.631A>T)  · Initiation of Aromasin and Ibrance in August 2019  · Difficulty with myelosuppression related to Ibrance requiring dose alterations, eventually changed to 100 mg for 7 days on followed by 7 days off.  · CT chest abdomen pelvis 10/26/2021 with increase in left hydronephrosis with increase in left perinephric and periureteral stranding. Decrease in stone in the left kidney lower pole (7 mm).  Stable small retroperitoneal lymph and left periaortic lymph nodes.  · PET scan 11/10/2021 with multiple bilateral hypermetabolic nodular pulmonary lesions, asymmetric moderate to severe left hydronephrosis with ill-defined fat stranding and soft tissue thickening in the renal sinus and surrounding the left ureter, hypermetabolic left retroperitoneal lymph node with slight increase in size, ill-defined hypermetabolic thickening in the inferior omentum with increase in size, uptake and C7 (indeterminate, recommended MRI).  Short segments of uptake in the mid and distal esophagus possibly related to gastritis.  · PET scan findings felt to be consistent with progressive malignancy.  Patient declined repeat omental biopsy.   · Options for further treatment discussed on 11/12/2021.  In light of renal failure and dialysis, options are more limited.  Recommendation to continue Ibrance and discontinue Aromasin, begin Faslodex.  Discussed possible future use of single agent Taxol.    · Aromasin discontinued 11/12/2021  · On 11/22/2021 initiation of Faslodex with continuation of Ibrance (100 mg daily for 7 days on followed by 7 days off).  · MRI cervical spine 11/18/2021 showed the right C7 normality  to likely represent metastatic disease.  With solitary bone lesion, did not recommend pursuit of bone modifying agent.  · Following 2 cycles Faslodex/Ibrance, PET scan on 1/11/2022 showed no change in the soft tissue superior to the dome of the bladder with hypermetabolic activity (likely related to carcinomatosis).  Significant decrease in injection of fat around the left renal pelvis and stable retroperitoneal hypermetabolic lymph nodes, decrease in size and activity in small bilateral pulmonary nodules.  Findings felt to represent evidence of response to treatment.    · Ibrance held briefly beginning 1/28/2022 due to hospitalization with COVID-19 infection and C. difficile colitis.  Patient developed leukopenia/neutropenia.  Ibrance resumed at previous dosing (100 mg daily 7 days on followed by 7 days off) on 2/9/22.  · Following 5 cycles Faslodex/Ibrance PET scan 4/19/2022 with evidence of mixed response.  New hypermetabolic lesion spinous process L2 (SUV 5.1) and slight increase in activity left clavicular metastasis (SUV increased 4.6-8.1).  C7 hypermetabolic mixed lytic and blastic bone lesion was unchanged.  Decrease in uptake involving omental thickening.  Stable soft tissue thickening around the left renal pelvis and ureter.  Discussion again regarding potential pursuit of IV chemotherapy with Taxol versus continuation of Ibrance and Faslodex with radiation to sites of bony disease at L2, left clavicle, C7.  Patient opted to defer initiation of systemic chemotherapy and continue Ibrance/Faslodex with consideration of radiation.  · Initiated monthly Xgeva on 5/19/2022 (cleared with nephrology).  Xgeva subsequently held due to significant hypocalcemia.  Decision made to forego further Xgeva with persistent hypocalcemia.  · Palliative radiation received to lumbar spine regarding L2 metastasis 5/23- 6/7/2022  · Ibrance held during radiation therapy beginning 5/22/2022.  Ibrance resumed 6/16/2022.  · PET scan  7/21/2022 with stable hypermetabolic abdominal pelvic lymph nodes and subcarinal lymph node, stable bone lesions at C7, left clavicle.  Stable soft tissue thickening around the left renal pelvis with left hydronephrosis.  Uptake in adrenal glands felt to be likely reactive (no change in size).  No activity noted at L2 (previously radiated).  New hypermetabolic lesion right anterior sixth rib.  · With evidence of further slight progression in bone on PET scan (new right sixth rib lesion), on 7/28/2022 discontinued Ibrance and plan to transition to abemaciclib with continuation of Faslodex.    · On 8/4/2022 initiated abemaciclib at reduced dose of 50 mg twice daily.  · On 8/25/2022, increased abemaciclib dose to 100 mg twice daily  · PET scan 10/6/2022 with stable findings with exception of left acetabular activity report noted new activity however on prior PET scan question of focal activity present previously.  No corresponding CT abnormality and significant increase in SUV at sacral lesion.  Scan otherwise stable.  Decision to continue current treatment  · Abemaciclib dose increased on 10/13/2022 up to 150 mg twice daily  · Patient returns today in follow-up due for cycle 12 Faslodex 500 mg IM every 4 weeks and continuing on abemaciclib 100 mg twice daily that was initiated 8//22 at 50 mg daily with dose increase on 8/25/2020 2 to 100 mg twice daily.  We did discuss potentially increasing up to 150 mg twice daily however at the last visit patient was experiencing diarrhea attributed to the medication and we held on dose increase at that time.  Fortunately, patient's diarrhea has subsided and she has not required any Imodium in the past 2 to 3 days.  She is overall tolerating treatment well otherwise with no significant additional side effects.  We reviewed her PET scan at length today and I discussed the findings with Dr. Alicea in radiology on the telephone today.  The scan is overall relatively stable with the  exception of an insignificant increase in SUV in the sacrum from 5.5 up to 7.7.  There was notation of a new lesion in the anterior left acetabulum.  In reviewing the last PET scan however there was a focal area of hypermetabolic activity in this area present at that time.  It is difficult to tell whether this is related to background speckled activity on that study versus a true lesion.  There is no corresponding CT abnormality in this area.  Therefore indeterminate whether the lesion is actually new, has increased slightly in size.  Patient remains asymptomatic from all of her bony lesions with no significant pain.  The patient is in favor of continuing her current regimen and I agree.  She is tolerating this well and the alternative would be to transition to IV chemotherapy with weekly Taxol which would certainly be much more difficult and would produce significantly more compromise on her quality of life.  Her diarrhea did subside and she is willing to try increasing her abemaciclib dose up to 150 mg twice daily.  We will monitor for recurrent diarrhea.  We discussed at a 3-month interval repeat PET scan.  She will return in 4 weeks for NP visit and cycle 13 Faslodex.  I will see her in 8 weeks when she is due for cycle 14 Faslodex.  In 11 weeks we will schedule PET scan and I will see her again in 12 weeks when she is due for cycle 14 Faslodex pending scan results.    *Anemia secondary to ESRD, underlying malignancy/chronic disease, myelosuppression from CDK 4/6 inhibitor, GI blood loss:  · Anemia secondary to ESRD, malignancy with exacerbation by use of Ibrance  · Previous evidence of folate deficiency 8/12/2019 with level 3.84, initiated folic acid 1 mg daily  · Previous evidence of iron deficiency, received Injectafer on 8/7/2020 750 mg IV x1 dose.  Second dose not administered due to onset of fever, subsequent iron studies precluded further administration of IV iron.  · Previous low normal B12 level  initiated oral B12 1000 mcg daily.  · Patient had previously received Procrit and required intermittent transfusion support  · Following initiation of hemodialysis, management of BEAU transitioned to nephrology, receiving Epogen with dialysis.  · Ongoing transfusion support prompted alteration in Ibrance dosing on 8/23/2021.  · After alteration in Ibrance dosing, hemoglobin improved and remained stable in the 9-10 range.  · Improved but ongoing intermittent need for transfusion support despite Ibrance dose alteration  · Stool negative for occult blood x3 on 11/2/2021  · Patient with reduced dialysis frequency to 2 days/week with concurrent decrease in Epogen dosing corresponding again to increased transfusion requirement.  · Epogen dose increased per nephrology to 20,000 units twice weekly with dialysis on Mondays and Fridays  · Ibrance again exacerbating anemia with ongoing intermittent transfusion requirements.  Ibrance subsequently discontinued on 7/28/2022 and patient initiated abemaciclib on 8/4/2022 which may be less myelosuppressive.  · Additional transfusions required with hemoglobin on 3/31/2022 6.6, hemoglobin 4/21/2022 of 6.4, hemoglobin on 5/5/2022 of 6.8, hemoglobin 6.4 on 7/14/2022, hemoglobin 6.4 on 7/28/2022, hemoglobin 6.8 on 8/18/2022.  · Patient did develop transient visible blood in stool in July 2022  · Anemia evaluation 7/28/2022 with iron 32, ferritin 412, iron saturation 15%, TIBC 210, folate 19.9, B12 925, haptoglobin 300, .  · Stool positive for occult blood x3 on 8/1/2022  · Patient was seen by GI on 8/16/2022, felt to be high risk for endoscopic evaluation and elected to forego EGD/colonoscopy for now  · Patient transitioned from hemodialysis in clinic to home hemodialysis 5 days/week x 2 hours beginning 8/29/2022.  With transition to home dialysis, nephrology transition the patient from Epogen to Mircera administered every 4 weeks in their office, initiated 8/22/2022.  · Patient  returns today in follow-up with hemoglobin that has declined slightly down to 7.7.  Hemoglobin more recently has been much improved in the 8- 9 range and this is attributed to the reduced myelosuppressive effects of abemaciclib in addition to the ongoing effects of Mircera.  She is due for Mircera on 10/18 and likely the effects are wearing off at this point accounting for her decline in hemoglobin today.  She has not seen any recent blood in her stool.  We do not plan for transfusion until hemoglobin is less than 7.  She would like to maintain every 2-week monitoring of her hemoglobin and this seems reasonable.    *ESRD on HD:  · Patient with previous CKD3/4  · Hospitalization 4/29-5/4/2021 with RYAN/CKD, UTI, anemia.  Required initiation of hemodialysis Tuesday Thursday Saturday.   · Decrease in frequency of dialysis to twice per week.  She continues to produce a significant amount of urine.   · Status post left upper extremity AV fistula placement on 11/3/2021 by vascular surgery  · Attempt to hold further dialysis on 1/11/2022 with close monitoring of her laboratory and clinical parameters.  Dialysis quickly resumed due to development of hyperkalemia.  · Patient was undergoing dialysis 2 days/week on Mondays and Fridays.    · On 8/22/2022 patient transitioned to use of home dialysis device 5 days/week x 2 hours.    *CHF with severe aortic stenosis:  · Echocardiogram 7/12/2019 showing ejection fraction 48% with moderate dilation of the LV cavity, global hypokinesis, grade 2 diastolic dysfunction, mild to moderate aortic stenosis, trivial pericardial effusion.  · Echocardiogram 7/19/2021 with ejection fraction 56%, left atrial volume moderately increased, grade 2 diastolic dysfunction, severe calcification aortic valve, moderate aortic stenosis, severe mitral calcification, mild mitral stenosis, marked elevation in RVSP from tricuspid regurgitation.  · Echocardiogram on 7/13/2022 with ejection fraction 56-60%,  grade 2 diastolic dysfunction, severe aortic stenosis.    · Cardiac catheterization 8/23/2022 showed normal coronary arteries, mild to moderate pulmonary hypertension.    · She was evaluated by cardiothoracic surgery Dr. Pagan and there was discussion regarding potential candidacy for TAVR although there was concern given her underlying comorbidities including her metastatic breast cancer.  I discussed patient's prognosis with Dr. Pagan.  She does have opportunities for additional treatment of her malignancy with intravenous chemotherapy and is willing to pursue this in the future when needed.  It is unclear as to her expected survival however given her multiple severe comorbidities with metastatic breast cancer, ESRD on dialysis, severe aortic stenosis, severe anemia.  There is consideration of possible pursuit of balloon valvuloplasty initially to assess her symptomatic response and then consider pursuit of TAVR in the future.   · Patient is awaiting further decision from cardiology as to recommendations for further management and is due back in follow-up there on 10/25/2022.    *Left upper and bilateral lower extremity peripheral neuropathy:  · Patient reports that left upper extremity neuropathy was not present from previous chemotherapy but occurred after hospitalization that required intubation and critical care stay.  Apparently felt to represent critical care neuropathy.  Improved over time.  · Bilateral lower extremity neuropathy felt to be related to diabetes  · May have future implications regarding treatment for breast cancer.  · Patient reports that peripheral neuropathy symptoms continue in the bilateral lower extremities, unchanged.  This will be a consideration with potential future use of Taxol    *Uterine/endometrial activity on PET scan:  · Patient experienced postmenopausal vaginal bleeding in 2013, negative endometrial biopsy and gynecologic evaluation.  · With findings on PET scan with enlarging  uterus and some hypermetabolic activity, referred back to Dr. Oliveros in gynecology.    · 9/19/2019 D&C with hysteroscopy by Dr. Oliveros, no significant intraoperative findings, pathologic results with benign findings, no evidence of malignancy.    *Nausea:  · Mild, relieved with Zofran.   · Patient experienced improvement in nausea after initiating hemodialysis  · No recent nausea    *Myelosuppression secondary to CDK 4/6 inhibitor:  · Patient was able to maintain adequate WBC after dosing alteration on Ibrance to treatment at 100 mg daily for 7 days on followed by 7 days off.  · Subsequent transition from Ibrance to abemaciclib  · Today, WBC 4.07    *Depression  · Patient with history of depression, worsened after the death of her son in November 2021  · With evidence of progressive malignancy in November 2021, patient agreeable to referral to supportive oncology  · Patient currently receiving gabapentin 300 mg nightly, Zoloft 150 mg daily, armodafinil 250 mg daily  · Patient does continue with difficulties regarding depression that wax and wane.  Currently symptoms under fair control.  Patient last seen by supportive oncology on 9/26/2022.    *Left perinephric stranding secondary to malignancy with hydronephrosis:  · CT 4/22/2021 showed interval enlargement of 2 staghorn calculi in the left kidney with persistent left perinephric stranding  · Hospitalization 4/29-5/4/2021 with RYAN/CKD, UTI, anemia.  Required 2 unit PRBC transfusion and initiated hemodialysis Tuesday Thursday Saturday.    · Discussion from urology regarding potential need for surgical procedure to address staghorn calculus left kidney  · CT scan 7/2/2021 with only 1 remaining left renal stone identified which had decreased in size.  Patient appears to have passed the other stone.  · As above, CT 10/26/2021 with more prominent left hydronephrosis and increase in left perinephric and periureteral stranding.  Felt to possibly be related to passage of  recent stone versus partial obstruction secondary to debris or thrombus in the left ureter versus pyelonephritis.  Patient however with no clinical evidence of recent stone passage nor pyelonephritis. Malignancy felt to be the cause of CT changes around the left kidney at this point.   · Left ureteral stent replacement 12/16/2021  · PET scan 1/11/2022 with decrease in fat injection near left renal pelvis, stent in satisfactory position.  Mild residual hydronephrosis on the left.  · Ureteral stent replaced on 3/10/2022  · PET scan 4/19/2022 with no hydronephrosis, left ureteral stent in place  · PET scan 7/21/2022 with persistent left hydronephrosis, ureteral stent in place  · PET scan 10/6/2022 with left nephroureteral stent in place, overall stable findings    *Right thyroid nodules  · Patient underwent prior thyroid biopsy by her report with benign findings many years ago, records not available  · On MRI cervical spine 11/18/2021, finding of 1.8 cm right thyroid nodule  · Thyroid ultrasound 11/26/2021 with right-sided thyroid nodules, 1 nodule was hypoechoic, solid measuring 2 cm with biopsy recommended.  · Thyroid ultrasound results reviewed with patient.  In light of her metastatic breast cancer, renal failure the significance of the thyroid nodule is felt to be much less.  We discussed possibility of thyroid biopsy however patient is inclined to forego this, especially since she has had a biopsy performed in the past with benign findings by her report.    · No hypermetabolic activity identified on PET scan 1/11/2022 in the neck    *Hospitalization 1/28-1/30/2022 due to COVID-19 infection and C. difficile colitis  · Patient fully vaccinated with 3 doses Moderna COVID-19 vaccine  · Patient developed symptoms 1/26/2022 with abrupt onset sinus congestion, mild cough, subsequently developed nausea and diarrhea on 1/27/2022.  Significant fatigue.  · Patient tested positive in emergency department on 1/28/2022 and was  admitted  · Patient maintained O2 saturation in mid 90% range on room air  · Patient received remdesivir  · Patient was also found to be positive for C. difficile colitis, received course of oral vancomycin.  · Symptoms from both COVID-19 and C. difficile colitis ultimately resolved.    *Hypocalcemia  · Patient developed significant hypocalcemia after receiving Xgeva on 5/19/2022  · Calcium management per nephrology  · No further Xgeva planned due to persistent significant hypocalcemia  · Calcium today 9.9.    Plan:  1. Patient will proceed with Faslodex 500 mg IM injection today and continue every 28 days (today is cycle 12)  2. Increase abemaciclib dose from 100 mg twice daily up to 150 mg twice daily (patient will not receive the 150 mg dose until next week)  3. Continue oral folic acid 1 mg daily, B12 1000 mcg daily.  4. The patient is continuing with home dialysis device 5 days/week x 2 hours.  5. Patient is receiving Mircera under the direction of nephrology, dosing every 4 weeks (due next on 10/18/2022).  6. Patient is continuing follow-up with cardiology and cardiothoracic surgery regarding management of severe aortic stenosis.  Await recommendations regarding possible TAVR versus initial balloon valvuloplasty.  Patient scheduled to be seen by cardiology on 10/25/2022  7. Continue to monitor hemoglobin closely regarding ongoing transfusion needs for hemoglobin less than 7.0.  8. In 2 weeks CBC and RN review  9. In 4 weeks nurse practitioner visit with CBC, CMP and Faslodex  10. In 6 weeks CBC and RN review  11. In 8 weeks MD visit with CBC, CMP and Faslodex  12. In 10 weeks CBC and RN review  13. In 11 weeks PET scan  14. In 12 weeks MD visit with CBC, CMP and Faslodex pending PET scan results.    Patient continues on high risk medication requiring intensive monitoring.    I did spend 50 minutes in total time caring for patient today, time spent in review of records, face-to-face consultation, coordination  of care, placement of orders, completion of documentation.

## 2022-10-13 ENCOUNTER — SPECIALTY PHARMACY (OUTPATIENT)
Dept: PHARMACY | Facility: HOSPITAL | Age: 62
End: 2022-10-13

## 2022-10-13 ENCOUNTER — OFFICE VISIT (OUTPATIENT)
Dept: ONCOLOGY | Facility: CLINIC | Age: 62
End: 2022-10-13

## 2022-10-13 ENCOUNTER — LAB (OUTPATIENT)
Dept: LAB | Facility: HOSPITAL | Age: 62
End: 2022-10-13

## 2022-10-13 ENCOUNTER — INFUSION (OUTPATIENT)
Dept: ONCOLOGY | Facility: HOSPITAL | Age: 62
End: 2022-10-13

## 2022-10-13 VITALS
SYSTOLIC BLOOD PRESSURE: 104 MMHG | TEMPERATURE: 97.3 F | DIASTOLIC BLOOD PRESSURE: 61 MMHG | RESPIRATION RATE: 18 BRPM | HEART RATE: 65 BPM | BODY MASS INDEX: 45.99 KG/M2 | HEIGHT: 67 IN | OXYGEN SATURATION: 96 % | WEIGHT: 293 LBS

## 2022-10-13 DIAGNOSIS — C50.919 METASTATIC BREAST CANCER: Primary | ICD-10-CM

## 2022-10-13 DIAGNOSIS — C50.919 METASTATIC BREAST CANCER: ICD-10-CM

## 2022-10-13 LAB
ALBUMIN SERPL-MCNC: 3.3 G/DL (ref 3.5–5.2)
ALBUMIN/GLOB SERPL: 0.8 G/DL (ref 1.1–2.4)
ALP SERPL-CCNC: 71 U/L (ref 38–116)
ALT SERPL W P-5'-P-CCNC: <5 U/L (ref 0–33)
ANION GAP SERPL CALCULATED.3IONS-SCNC: 11.6 MMOL/L (ref 5–15)
AST SERPL-CCNC: 11 U/L (ref 0–32)
BASOPHILS # BLD AUTO: 0.07 10*3/MM3 (ref 0–0.2)
BASOPHILS NFR BLD AUTO: 1.7 % (ref 0–1.5)
BILIRUB SERPL-MCNC: 0.4 MG/DL (ref 0.2–1.2)
BUN SERPL-MCNC: 22 MG/DL (ref 6–20)
BUN/CREAT SERPL: 4.3 (ref 7.3–30)
CALCIUM SPEC-SCNC: 9.9 MG/DL (ref 8.5–10.2)
CHLORIDE SERPL-SCNC: 99 MMOL/L (ref 98–107)
CO2 SERPL-SCNC: 28.4 MMOL/L (ref 22–29)
CREAT SERPL-MCNC: 5.08 MG/DL (ref 0.6–1.1)
DEPRECATED RDW RBC AUTO: 60.7 FL (ref 37–54)
EGFRCR SERPLBLD CKD-EPI 2021: 9 ML/MIN/1.73
EOSINOPHIL # BLD AUTO: 0.24 10*3/MM3 (ref 0–0.4)
EOSINOPHIL NFR BLD AUTO: 5.9 % (ref 0.3–6.2)
ERYTHROCYTE [DISTWIDTH] IN BLOOD BY AUTOMATED COUNT: 17.3 % (ref 12.3–15.4)
GLOBULIN UR ELPH-MCNC: 4.3 GM/DL (ref 1.8–3.5)
GLUCOSE SERPL-MCNC: 175 MG/DL (ref 74–124)
HCT VFR BLD AUTO: 24.6 % (ref 34–46.6)
HGB BLD-MCNC: 7.7 G/DL (ref 12–15.9)
IMM GRANULOCYTES # BLD AUTO: 0.02 10*3/MM3 (ref 0–0.05)
IMM GRANULOCYTES NFR BLD AUTO: 0.5 % (ref 0–0.5)
LYMPHOCYTES # BLD AUTO: 0.68 10*3/MM3 (ref 0.7–3.1)
LYMPHOCYTES NFR BLD AUTO: 16.7 % (ref 19.6–45.3)
MCH RBC QN AUTO: 30.3 PG (ref 26.6–33)
MCHC RBC AUTO-ENTMCNC: 31.3 G/DL (ref 31.5–35.7)
MCV RBC AUTO: 96.9 FL (ref 79–97)
MONOCYTES # BLD AUTO: 0.27 10*3/MM3 (ref 0.1–0.9)
MONOCYTES NFR BLD AUTO: 6.6 % (ref 5–12)
NEUTROPHILS NFR BLD AUTO: 2.79 10*3/MM3 (ref 1.7–7)
NEUTROPHILS NFR BLD AUTO: 68.6 % (ref 42.7–76)
NRBC BLD AUTO-RTO: 0 /100 WBC (ref 0–0.2)
PLATELET # BLD AUTO: 247 10*3/MM3 (ref 140–450)
PMV BLD AUTO: 8.9 FL (ref 6–12)
POTASSIUM SERPL-SCNC: 3.5 MMOL/L (ref 3.5–4.7)
PROT SERPL-MCNC: 7.6 G/DL (ref 6.3–8)
RBC # BLD AUTO: 2.54 10*6/MM3 (ref 3.77–5.28)
SODIUM SERPL-SCNC: 139 MMOL/L (ref 134–145)
WBC NRBC COR # BLD: 4.07 10*3/MM3 (ref 3.4–10.8)

## 2022-10-13 PROCEDURE — 85025 COMPLETE CBC W/AUTO DIFF WBC: CPT

## 2022-10-13 PROCEDURE — 80053 COMPREHEN METABOLIC PANEL: CPT

## 2022-10-13 PROCEDURE — 25010000002 FULVESTRANT PER 25 MG: Performed by: INTERNAL MEDICINE

## 2022-10-13 PROCEDURE — 96402 CHEMO HORMON ANTINEOPL SQ/IM: CPT

## 2022-10-13 PROCEDURE — 99215 OFFICE O/P EST HI 40 MIN: CPT | Performed by: INTERNAL MEDICINE

## 2022-10-13 PROCEDURE — 36415 COLL VENOUS BLD VENIPUNCTURE: CPT

## 2022-10-13 RX ADMIN — FULVESTRANT 500 MG: 250 INJECTION INTRAMUSCULAR at 12:19

## 2022-10-13 NOTE — PROGRESS NOTES
MTM Chart Review    MD dictation noted:   1. Increase abemaciclib dose from 100 mg twice daily up to 150 mg twice daily     New RX sent to pharmacy to reflect updated sig, with MD to cosign. Pharmacy will continue to follow.       Darlene España RPH  10/13/2022  13:00 EDT

## 2022-10-13 NOTE — PROGRESS NOTES
I called [facility] and spoke with Apolonia. I cancelled the 100 mg Verzenio since the pt was changed to 150 mg.

## 2022-10-14 ENCOUNTER — TELEPHONE (OUTPATIENT)
Dept: ONCOLOGY | Facility: CLINIC | Age: 62
End: 2022-10-14

## 2022-10-14 NOTE — TELEPHONE ENCOUNTER
----- Message from Mela Cannon sent at 10/14/2022  8:43 AM EDT -----    ----- Message -----  From: Roxie Gamino RegSched Rep  Sent: 10/14/2022   8:25 AM EDT  To: Mela Cannon    In 10 weeks CBC and RN review. In 11 weeks PET scan. In 12 weeks 2 unit Dr. Irvin visit CBC, CMP and Faslodex.     Mela,    I don't see the rest set up? If you can look please, thank you, Malena

## 2022-10-25 ENCOUNTER — OFFICE VISIT (OUTPATIENT)
Dept: CARDIOLOGY | Facility: CLINIC | Age: 62
End: 2022-10-25

## 2022-10-25 VITALS
HEIGHT: 67 IN | BODY MASS INDEX: 45.99 KG/M2 | DIASTOLIC BLOOD PRESSURE: 73 MMHG | HEART RATE: 61 BPM | SYSTOLIC BLOOD PRESSURE: 142 MMHG | OXYGEN SATURATION: 92 % | WEIGHT: 293 LBS

## 2022-10-25 DIAGNOSIS — U07.1 COVID-19 VIRUS DETECTED: ICD-10-CM

## 2022-10-25 DIAGNOSIS — I50.32 DIASTOLIC CHF, CHRONIC: ICD-10-CM

## 2022-10-25 DIAGNOSIS — E78.2 MIXED HYPERLIPIDEMIA: ICD-10-CM

## 2022-10-25 DIAGNOSIS — Z79.4 CONTROLLED TYPE 2 DIABETES MELLITUS WITH CHRONIC KIDNEY DISEASE, WITH LONG-TERM CURRENT USE OF INSULIN, UNSPECIFIED CKD STAGE: ICD-10-CM

## 2022-10-25 DIAGNOSIS — I10 PRIMARY HYPERTENSION: ICD-10-CM

## 2022-10-25 DIAGNOSIS — E11.22 CONTROLLED TYPE 2 DIABETES MELLITUS WITH CHRONIC KIDNEY DISEASE, WITH LONG-TERM CURRENT USE OF INSULIN, UNSPECIFIED CKD STAGE: ICD-10-CM

## 2022-10-25 DIAGNOSIS — I35.0 NONRHEUMATIC AORTIC VALVE STENOSIS: ICD-10-CM

## 2022-10-25 DIAGNOSIS — E03.9 ACQUIRED HYPOTHYROIDISM: ICD-10-CM

## 2022-10-25 DIAGNOSIS — Q23.1 BICUSPID AORTIC VALVE: Primary | ICD-10-CM

## 2022-10-25 DIAGNOSIS — E66.01 MORBID OBESITY WITH BMI OF 50.0-59.9, ADULT: ICD-10-CM

## 2022-10-25 PROCEDURE — 99214 OFFICE O/P EST MOD 30 MIN: CPT | Performed by: NURSE PRACTITIONER

## 2022-10-25 PROCEDURE — 93000 ELECTROCARDIOGRAM COMPLETE: CPT | Performed by: NURSE PRACTITIONER

## 2022-10-25 NOTE — PROGRESS NOTES
Date of Office Visit: 10/25/2022  Encounter Provider: BETY Clark  Place of Service: [facility] CARDIOLOGY  Patient Name: Juana Hall  :1960    Chief Complaint   Patient presents with   • Follow-up   • Hyperlipidemia   :     HPI: Juana Hall is a 62 y.o. female who is a patient of Dr. Rivers and is new to me today.  She has a history of metastatic breast cancer, end-stage renal disease on hemodialysis, normocytic anemia, obesity, immobility, chronic normocytic anemia, bicuspid aortic valve with severe stenosis.  She presented for evaluation of her severe aortic valve stenosis on .  She also has sleep apnea and wears a CPAP at night.  Her primary cardiologist was Dr. Harkins.  She was set up for right and left heart cath due to her morbid obesity.  She had no significant coronary disease and mild pulmonary hypertension.  She was seen by cardiothoracic surgery.  A discussion was had with her oncologist who is treating her for metastatic lobular breast cancer.  And that her life expectancy could be less than 24 months and we opted to treat her conservatively because of her prognosis.  She comes in for follow-up today.    Overall she is not feeling bad.  She been having some issues with nausea and her chemo medication but that is improved.  She denies chest pain, shortness of breath, dizziness, lightheadedness or near syncope.  Her edema is stable.  She is on dialysis at home 5 days a week which is hemodialysis.  She did see her oncologist recently.  She is unsure what the plan is for her aortic valve.  Apparently Dr. Irvin has recently discussed with Dr. Rivers a possible valvuloplasty.  Previous testing and notes have been reviewed by me.   Past Medical History:   Diagnosis Date   • Anemia    • Anxiety and depression    • Bicuspid aortic valve    • Breast cancer (HCC)     RIGHT, HAD NEELA. MASTECTOMY, CHEMO AND RADIATION   • CHF (congestive heart  failure) ([facility])    • CKD (chronic kidney disease)    • Diabetes mellitus (HCC)    • Dialysis patient ([facility])     TUES AND SATURDAY [facility]   • Elevated cholesterol    • Frequent UTI    • Heart murmur    • History of kidney stones    • History of MRSA infection     POST BREAST SURGERY-TREATED [facility]   • History of sepsis     KIDNEY STONE   • History of transfusion    • Hyperlipidemia    • Hypertension    • Hyperthyroidism    • Hypothyroidism    • Kidney stone     CURRENT LEFT-DEC 2021   • Lymphedema of leg     LEFT   • Metastasis from breast cancer (HCC)     STOMACH-OMENTUM   • Neuromuscular disorder (HCC)    • Obesity    • ANDREW on CPAP     CPAP   • Osteoarthrosis    • Peripheral neuropathy     FEET BILAT   • Radiculopathy    • Rectal bleeding 2022   • Thickened endometrium    • Weakness     BILAT LEGS-WITH ANY AMT OF TIME       Past Surgical History:   Procedure Laterality Date   • ARTERIOVENOUS FISTULA/SHUNT SURGERY Left 2021    Procedure: LEFT  BRACHIOCEPALIC ARTERIOVENOUS FISTULA;  Surgeon: Dejuan Adler MD;  Location: [facility] OR;  Service: Vascular;  Laterality: Left;   • BREAST RECONSTRUCTION      WITH IMPLANTS, AND REVISION, NOW REMOVED   • BREAST SURGERY     • CARDIAC CATHETERIZATION N/A 2022    Procedure: CORONARY ANGIOGRAPHY;  Surgeon: Luis Rivers MD;  Location: [facility] CATH INVASIVE LOCATION;  Service: Cardiology;  Laterality: N/A;   • CARDIAC CATHETERIZATION N/A 2022    Procedure: Right Heart Cath;  Surgeon: Luis Rivers MD;  Location: [facility] CATH INVASIVE LOCATION;  Service: Cardiology;  Laterality: N/A;   • CATARACT EXTRACTION WITH INTRAOCULAR LENS IMPLANT Bilateral    •  SECTION  1987   •  SECTION  1987   • CYSTOSCOPY W/ URETERAL STENT PLACEMENT     • CYSTOSCOPY W/ URETERAL STENT PLACEMENT Left 2021    Procedure: CYSTOSCOPY KEFT RETROGRADE PYELOGRAM  LEFT  URETERAL STENT PLACEMENT;   Surgeon: Leodan Mckee Jr., MD;  Location: [facility] MAIN OR;  Service: Urology;  Laterality: Left;   • CYSTOSCOPY W/ URETERAL STENT PLACEMENT Left 03/10/2022    Procedure: CYSTOSCOPY AND LEFT URETERAL STENT EXCHANGE, RETROGRADE PYELOGRAM;  Surgeon: Leodan Mckee Jr., MD;  Location: [facility] MAIN OR;  Service: Urology;  Laterality: Left;   • D & C HYSTEROSCOPY N/A 2017    Procedure: DILATATION AND CURETTAGE, HYSTEROSCOPY ;  Surgeon: Cherie Oliveros MD;  Location:  [facility];  Service:    • D & C HYSTEROSCOPY N/A 2019    Procedure: DILATATION AND CURETTAGE HYSTEROSCOPY/POLYPECTOMY;  Surgeon: Cherie Oliveros MD;  [facility];  Service: Gynecology   • INSERTION AND REMOVAL HEMODIALYSIS CATHETER Right 2021   • INSERTION HEMODIALYSIS CATHETER Right 2021    Procedure: HEMODIALYSIS CATHETER INSERTION;  Surgeon: Clint Hernández MD;  Location: [facility] MAIN OR;  Service: Vascular;  Laterality: Right;   • LYMPH NODE BIOPSY     • MASTECTOMY Bilateral        Social History     Socioeconomic History   • Marital status:      Spouse name: Rk   Tobacco Use   • Smoking status: Never   • Smokeless tobacco: Never   Vaping Use   • Vaping Use: Never used   Substance and Sexual Activity   • Alcohol use: No   • Drug use: No   • Sexual activity: Yes     Partners: Male     Birth control/protection: Post-menopausal       Family History   Problem Relation Age of Onset   • Heart attack Mother 72   • Coronary artery disease Mother         Mother  from MI at 72   • Diabetes Mother    • Breast cancer Mother    • Hyperlipidemia Mother    • Arthritis Mother    • Cancer Mother    • Heart attack Father 74   • Aortic aneurysm Father         Father with ruptured thoracic aortic aneurysm   • Coronary artery disease Father         Father  from MI at 74   • Heart disease Father    • Hyperlipidemia Father    • Arthritis Father    • Heart attack Maternal Grandmother 76   •  Coronary artery disease Maternal Grandmother         STEPHANI deceeased from MI at age 76   • Malig Hyperthermia Neg Hx        Review of Systems   Constitutional: Negative for diaphoresis and malaise/fatigue.   Cardiovascular: Negative for chest pain, claudication, dyspnea on exertion, irregular heartbeat, leg swelling, near-syncope, orthopnea, palpitations, paroxysmal nocturnal dyspnea and syncope.   Respiratory: Negative for cough, shortness of breath and sleep disturbances due to breathing.    Musculoskeletal: Negative for falls.   Neurological: Negative for dizziness and weakness.   Psychiatric/Behavioral: Negative for altered mental status and substance abuse.       No Known Allergies      Current Outpatient Medications:   •  abemaciclib (VERZENIO) 150 mg tablet chemo tablet, Take 1 tablet by mouth 2 (Two) Times a Day., Disp: 60 tablet, Rfl: 5  •  acetaminophen (TYLENOL) 500 MG tablet, Take 500 mg by mouth As Needed., Disp: , Rfl:   •  Armodafinil 250 MG tablet, Take 1 tablet by mouth Daily., Disp: 30 tablet, Rfl: 2  •  aspirin 81 MG EC tablet, Take 81 mg by mouth Daily., Disp: , Rfl:   •  atorvastatin (LIPITOR) 40 MG tablet, Take 1 tablet by mouth Every Night., Disp: 90 tablet, Rfl: 1  •  carvedilol (COREG) 3.125 MG tablet, TAKE ONE TABLET BY MOUTH TWICE A DAY, Disp: 180 tablet, Rfl: 1  •  folic acid (FOLVITE) 1 MG tablet, TAKE ONE TABLET BY MOUTH DAILY, Disp: 30 tablet, Rfl: 2  •  gabapentin (Neurontin) 300 MG capsule, Take 1 capsule by mouth Every Night., Disp: 60 capsule, Rfl: 2  •  Levemir FlexTouch 100 UNIT/ML injection, Inject 40 Units under the skin into the appropriate area as directed Daily. (Patient taking differently: Inject 50 Units under the skin into the appropriate area as directed Daily.), Disp: 13 pen, Rfl: 1  •  levothyroxine (SYNTHROID, LEVOTHROID) 50 MCG tablet, Take 1 tablet by mouth Daily., Disp: 90 tablet, Rfl: 1  •  mupirocin (BACTROBAN) 2 % ointment, , Disp: , Rfl:   •  NON FORMULARY, Take  "5 mg by mouth 2 (Two) Times a Day. [place], Disp: , Rfl:   •  NON FORMULARY, Inject 1 dose under the skin into the appropriate area as directed Every 30 (Thirty) Days. facidex, Disp: , Rfl:   •  ondansetron (Zofran) 8 MG tablet, Take 1 tablet by mouth Every 8 (Eight) Hours As Needed for Nausea or Vomiting., Disp: 30 tablet, Rfl: 2  •  sertraline (ZOLOFT) 100 MG tablet, Take 1.5 tablets by mouth Daily., Disp: 135 tablet, Rfl: 0  •  vitamin B-12 (CYANOCOBALAMIN) 1000 MCG tablet, Take 1,000 mcg by mouth Daily., Disp: , Rfl:   •  vitamin D (ERGOCALCIFEROL) 48022 units capsule capsule, Take 50,000 Units by mouth Every 7 (Seven) Days. WEDNESDAY, Disp: , Rfl:   •  amLODIPine (NORVASC) 5 MG tablet, Take 1 tablet by mouth Daily., Disp: 90 tablet, Rfl: 3      Objective:     Vitals:    10/25/22 1301   BP: 142/73   Pulse: 61   SpO2: 92%   Weight: (!) 165 kg (363 lb)   Height: 170.2 cm (67\")     Body mass index is 56.85 kg/m².    PHYSICAL EXAM:    Constitutional:       General: Not in acute distress.     Appearance: Normal appearance. Well-developed.   Eyes:      Pupils: Pupils are equal, round, and reactive to light.   HENT:      Head: Normocephalic.   Neck:      Vascular: Carotid bruit present. No JVD.   Pulmonary:      Effort: Pulmonary effort is normal. No tachypnea.      Breath sounds: Normal breath sounds. No wheezing. No rales.   Cardiovascular:      Normal rate. Regular rhythm.      Murmurs: There is a systolic murmur.      No gallop.   Pulses:     Intact distal pulses.   Edema:     Peripheral edema absent.   Abdominal:      General: Bowel sounds are normal.      Palpations: Abdomen is soft.      Tenderness: There is no abdominal tenderness.   Musculoskeletal: Normal range of motion.      Cervical back: Normal range of motion and neck supple. No edema. Skin:     General: Skin is warm and dry.   Neurological:      Mental Status: Alert and oriented to person, place, and time.           ECG 12 Lead    Date/Time: 10/25/2022 " 1:45 PM  Performed by: Cherie Hernández APRN  Authorized by: Cherie Hernández APRN   Comparison: compared with previous ECG from 8/18/2022  Similar to previous ECG  Rhythm: sinus rhythm  Ectopy: atrial premature contractions  Rate: normal  QRS axis: normal    Clinical impression: non-specific ECG              Assessment:       Diagnosis Plan   1. Bicuspid aortic valve        2. Diastolic CHF, chronic ([facility])        3. Mixed hyperlipidemia        4. Primary hypertension        5. Nonrheumatic aortic valve stenosis        6. Acquired hypothyroidism        7. Morbid obesity with BMI of 50.0-59.9, adult ([facility])        8. Controlled type 2 diabetes mellitus with chronic kidney disease, with long-term current use of insulin, unspecified CKD stage ([facility])        9. COVID-19 virus detected          No orders of the defined types were placed in this encounter.         Plan:       Hemodynamically at this time she is stable.  She denies any chest pain pressure or tightness.  She appears pretty asymptomatic at this time.  She is tolerating dialysis at home.  She recently had her amlodipine stopped for low blood pressures.  I am going to have Dr. Rivers call and discussed with her TAVR plans and further recommendations and she can follow-up in 3 months.         Your medication list          Accurate as of October 25, 2022  1:20 PM. If you have any questions, ask your nurse or doctor.            CHANGE how you take these medications      Instructions Last Dose Given Next Dose Due   Levemir FlexTouch 100 UNIT/ML injection  Generic drug: insulin detemir  What changed: how much to take      Inject 40 Units under the skin into the appropriate area as directed Daily.          CONTINUE taking these medications      Instructions Last Dose Given Next Dose Due   abemaciclib 150 mg tablet chemo tablet  Commonly known as: VERZENIO      Take 1 tablet by mouth 2 (Two) Times a Day.       acetaminophen 500 MG tablet  Commonly known as: TYLENOL       Take 500 mg by mouth As Needed.       amLODIPine 5 MG tablet  Commonly known as: NORVASC      Take 1 tablet by mouth Daily.       Armodafinil 250 MG tablet      Take 1 tablet by mouth Daily.       aspirin 81 MG EC tablet      Take 81 mg by mouth Daily.       atorvastatin 40 MG tablet  Commonly known as: LIPITOR      Take 1 tablet by mouth Every Night.       carvedilol 3.125 MG tablet  Commonly known as: COREG      TAKE ONE TABLET BY MOUTH TWICE A DAY       folic acid 1 MG tablet  Commonly known as: FOLVITE      TAKE ONE TABLET BY MOUTH DAILY       gabapentin 300 MG capsule  Commonly known as: Neurontin      Take 1 capsule by mouth Every Night.       levothyroxine 50 MCG tablet  Commonly known as: SYNTHROID, LEVOTHROID      Take 1 tablet by mouth Daily.       mupirocin 2 % ointment  Commonly known as: BACTROBAN           NON FORMULARY      Take 5 mg by mouth 2 (Two) Times a Day. Versinio       NON FORMULARY      Inject 1 dose under the skin into the appropriate area as directed Every 30 (Thirty) Days. facidex       ondansetron 8 MG tablet  Commonly known as: Zofran      Take 1 tablet by mouth Every 8 (Eight) Hours As Needed for Nausea or Vomiting.       sertraline 100 MG tablet  Commonly known as: ZOLOFT      Take 1.5 tablets by mouth Daily.       vitamin B-12 1000 MCG tablet  Commonly known as: CYANOCOBALAMIN      Take 1,000 mcg by mouth Daily.       vitamin D 1.25 MG (73400 UT) capsule capsule  Commonly known as: ERGOCALCIFEROL      Take 50,000 Units by mouth Every 7 (Seven) Days. WEDNESDAY                As always, it has been a pleasure to participate in your patient's care.      Sincerely,     Cherie ALBERT

## 2022-10-27 ENCOUNTER — CLINICAL SUPPORT (OUTPATIENT)
Dept: ONCOLOGY | Facility: HOSPITAL | Age: 62
End: 2022-10-27

## 2022-10-27 ENCOUNTER — LAB (OUTPATIENT)
Dept: LAB | Facility: HOSPITAL | Age: 62
End: 2022-10-27

## 2022-10-27 DIAGNOSIS — C50.919 METASTATIC BREAST CANCER: ICD-10-CM

## 2022-10-27 DIAGNOSIS — C50.919 METASTATIC BREAST CANCER: Primary | ICD-10-CM

## 2022-10-27 LAB
ABO GROUP BLD: NORMAL
ANTI-E: NORMAL
BASOPHILS # BLD AUTO: 0.06 10*3/MM3 (ref 0–0.2)
BASOPHILS NFR BLD AUTO: 1.8 % (ref 0–1.5)
BLD GP AB SCN SERPL QL: POSITIVE
DEPRECATED RDW RBC AUTO: 68.9 FL (ref 37–54)
EOSINOPHIL # BLD AUTO: 0.26 10*3/MM3 (ref 0–0.4)
EOSINOPHIL NFR BLD AUTO: 7.7 % (ref 0.3–6.2)
ERYTHROCYTE [DISTWIDTH] IN BLOOD BY AUTOMATED COUNT: 19.9 % (ref 12.3–15.4)
HCT VFR BLD AUTO: 21.3 % (ref 34–46.6)
HGB BLD-MCNC: 6.6 G/DL (ref 12–15.9)
IMM GRANULOCYTES # BLD AUTO: 0.02 10*3/MM3 (ref 0–0.05)
IMM GRANULOCYTES NFR BLD AUTO: 0.6 % (ref 0–0.5)
LYMPHOCYTES # BLD AUTO: 0.6 10*3/MM3 (ref 0.7–3.1)
LYMPHOCYTES NFR BLD AUTO: 17.8 % (ref 19.6–45.3)
MCH RBC QN AUTO: 30.4 PG (ref 26.6–33)
MCHC RBC AUTO-ENTMCNC: 31 G/DL (ref 31.5–35.7)
MCV RBC AUTO: 98.2 FL (ref 79–97)
MONOCYTES # BLD AUTO: 0.29 10*3/MM3 (ref 0.1–0.9)
MONOCYTES NFR BLD AUTO: 8.6 % (ref 5–12)
NEUTROPHILS NFR BLD AUTO: 2.15 10*3/MM3 (ref 1.7–7)
NEUTROPHILS NFR BLD AUTO: 63.5 % (ref 42.7–76)
NRBC BLD AUTO-RTO: 0 /100 WBC (ref 0–0.2)
PLATELET # BLD AUTO: 215 10*3/MM3 (ref 140–450)
PMV BLD AUTO: 8.8 FL (ref 6–12)
RBC # BLD AUTO: 2.17 10*6/MM3 (ref 3.77–5.28)
RH BLD: POSITIVE
T&S EXPIRATION DATE: NORMAL
WBC NRBC COR # BLD: 3.38 10*3/MM3 (ref 3.4–10.8)

## 2022-10-27 PROCEDURE — 86922 COMPATIBILITY TEST ANTIGLOB: CPT

## 2022-10-27 PROCEDURE — 85025 COMPLETE CBC W/AUTO DIFF WBC: CPT

## 2022-10-27 PROCEDURE — 86920 COMPATIBILITY TEST SPIN: CPT

## 2022-10-27 PROCEDURE — 86902 BLOOD TYPE ANTIGEN DONOR EA: CPT

## 2022-10-27 PROCEDURE — 86870 RBC ANTIBODY IDENTIFICATION: CPT

## 2022-10-27 PROCEDURE — 36415 COLL VENOUS BLD VENIPUNCTURE: CPT

## 2022-10-27 PROCEDURE — 86900 BLOOD TYPING SEROLOGIC ABO: CPT

## 2022-10-27 PROCEDURE — 86901 BLOOD TYPING SEROLOGIC RH(D): CPT

## 2022-10-27 PROCEDURE — 86850 RBC ANTIBODY SCREEN: CPT

## 2022-10-27 RX ORDER — ACETAMINOPHEN 325 MG/1
650 TABLET ORAL ONCE
Status: CANCELLED | OUTPATIENT
Start: 2022-10-29 | End: 2022-10-27

## 2022-10-27 RX ORDER — DIPHENHYDRAMINE HCL 25 MG
25 CAPSULE ORAL ONCE
Status: CANCELLED | OUTPATIENT
Start: 2022-10-29 | End: 2022-10-27

## 2022-10-27 RX ORDER — SODIUM CHLORIDE 9 MG/ML
250 INJECTION, SOLUTION INTRAVENOUS AS NEEDED
Status: CANCELLED | OUTPATIENT
Start: 2022-10-29

## 2022-10-27 NOTE — NURSING NOTE
Pt presents for CBC w/ RN review. With exception to Hgb, counts are stable. Hgb is 6.6 today. Pt reports feeling well and is surprised her Hgb is as low as that. She reports she is tolerating the dosage increase to her Verzenio well. Per Sofi PA, pt to receive 2 units PRBCs. Orders placed and pt taken to the lab for BB armband. She will stop by scheduling prior to departure. Pt v/u    Lab Results   Component Value Date    WBC 3.38 (L) 10/27/2022    HGB 6.6 (C) 10/27/2022    HCT 21.3 (L) 10/27/2022    MCV 98.2 (H) 10/27/2022     10/27/2022

## 2022-10-29 ENCOUNTER — INFUSION (OUTPATIENT)
Dept: ONCOLOGY | Facility: HOSPITAL | Age: 62
End: 2022-10-29

## 2022-10-29 VITALS
HEART RATE: 54 BPM | TEMPERATURE: 97.2 F | RESPIRATION RATE: 18 BRPM | OXYGEN SATURATION: 98 % | SYSTOLIC BLOOD PRESSURE: 116 MMHG | DIASTOLIC BLOOD PRESSURE: 56 MMHG

## 2022-10-29 DIAGNOSIS — C50.919 METASTATIC BREAST CANCER: ICD-10-CM

## 2022-10-29 PROCEDURE — 86900 BLOOD TYPING SEROLOGIC ABO: CPT

## 2022-10-29 PROCEDURE — 36430 TRANSFUSION BLD/BLD COMPNT: CPT

## 2022-10-29 PROCEDURE — 63710000001 DIPHENHYDRAMINE PER 50 MG: Performed by: INTERNAL MEDICINE

## 2022-10-29 PROCEDURE — P9016 RBC LEUKOCYTES REDUCED: HCPCS

## 2022-10-29 RX ORDER — DIPHENHYDRAMINE HCL 25 MG
25 CAPSULE ORAL ONCE
Status: COMPLETED | OUTPATIENT
Start: 2022-10-29 | End: 2022-10-29

## 2022-10-29 RX ORDER — DIPHENHYDRAMINE HYDROCHLORIDE 50 MG/ML
25 INJECTION INTRAMUSCULAR; INTRAVENOUS ONCE
Status: COMPLETED | OUTPATIENT
Start: 2022-10-29 | End: 2022-10-29

## 2022-10-29 RX ORDER — ACETAMINOPHEN 325 MG/1
650 TABLET ORAL ONCE
Status: COMPLETED | OUTPATIENT
Start: 2022-10-29 | End: 2022-10-29

## 2022-10-29 RX ORDER — ACETAMINOPHEN 160 MG/5ML
650 SOLUTION ORAL ONCE
Status: COMPLETED | OUTPATIENT
Start: 2022-10-29 | End: 2022-10-29

## 2022-10-29 RX ORDER — ACETAMINOPHEN 650 MG/1
650 SUPPOSITORY RECTAL ONCE
Status: COMPLETED | OUTPATIENT
Start: 2022-10-29 | End: 2022-10-29

## 2022-10-29 RX ADMIN — DIPHENHYDRAMINE HYDROCHLORIDE 25 MG: 25 CAPSULE ORAL at 09:37

## 2022-10-29 RX ADMIN — ACETAMINOPHEN 650 MG: 325 TABLET ORAL at 09:37

## 2022-11-07 ENCOUNTER — OFFICE VISIT (OUTPATIENT)
Dept: PSYCHIATRY | Facility: HOSPITAL | Age: 62
End: 2022-11-07

## 2022-11-07 DIAGNOSIS — R53.83 FATIGUE, UNSPECIFIED TYPE: ICD-10-CM

## 2022-11-07 DIAGNOSIS — F41.9 ANXIETY AND DEPRESSION: ICD-10-CM

## 2022-11-07 DIAGNOSIS — G47.33 OSA (OBSTRUCTIVE SLEEP APNEA): ICD-10-CM

## 2022-11-07 DIAGNOSIS — F32.A ANXIETY AND DEPRESSION: ICD-10-CM

## 2022-11-07 DIAGNOSIS — F41.1 GENERALIZED ANXIETY DISORDER: Primary | ICD-10-CM

## 2022-11-07 PROCEDURE — 90853 GROUP PSYCHOTHERAPY: CPT | Performed by: PSYCHIATRY & NEUROLOGY

## 2022-11-07 PROCEDURE — 99212 OFFICE O/P EST SF 10 MIN: CPT | Performed by: PSYCHIATRY & NEUROLOGY

## 2022-11-07 RX ORDER — SERTRALINE HYDROCHLORIDE 100 MG/1
150 TABLET, FILM COATED ORAL DAILY
Qty: 135 TABLET | Refills: 0 | Status: SHIPPED | OUTPATIENT
Start: 2022-11-07

## 2022-11-07 RX ORDER — ARMODAFINIL 250 MG/1
250 TABLET ORAL DAILY
Qty: 30 TABLET | Refills: 2 | Status: SHIPPED | OUTPATIENT
Start: 2022-11-07 | End: 2023-03-16

## 2022-11-07 RX ORDER — GABAPENTIN 300 MG/1
300 CAPSULE ORAL NIGHTLY
Qty: 60 CAPSULE | Refills: 2 | Status: SHIPPED | OUTPATIENT
Start: 2022-11-07 | End: 2023-11-07

## 2022-11-07 NOTE — PROGRESS NOTES
Subjective  Patient ID: Juana Hall is a 62 y.o.. female seen in regularly scheduled group session.  Group participants have consented to group conducted via video through Zoom.  Telehealth provided in patient's home.  Location of provider: [facility]    Total Group Time, Face to Face via Zoom: 75 minutes  Total Group Participants: 6, plus facilitation per Dr. Mary Vazquez    Group Therapy  Group topics explored including development of new normal, short and long term effects of diagnosis and treatment, pain management, physical deconditioning, concern for loved ones, expectation of return to previous normal, physical limitations and anxiety surrounding adjusted treatment plan.    Counseling provided regarding reintroduction to activity through graded tasks, recognition and allowance of need for rest, benefits of peer support including therapeutic factors of group, discussion of need for restorative sleep , consideration of sleep apnea, early and late treatment effects with potential strategies for managing persistent symptoms, benefits of exercise and strategies for managing barriers to engagement, medication education, anticipatory anxiety, fears of recurrence, exploration of quality of life goals and effective communication strategies.    Discussed current programming available in [facility] and community.    Patient response to group: Pt an active and engaged participant    Medications Management  Dr. Mary Vazquez present for medication discussion and management.  Pt continues in survivorship of metastatic breast cancer and ERD, undergoing home dialysis    Constitutional Exam: Pt is alert, oriented, and engaged in conversation; affect and mood at baseline, trys to keep from getting demoralized, expressing gratitude for husbands support and care..  CC: fatigue  HPI:  Pt with end stage renal disease, overall coping by dealing with hear and now, at new normal baseline  Psychological  ROS: positive for - anxiety, concentration difficulties, memory difficulties and sleep disturbances, compliant with CPAP and daytime functioning and performance enhanced with armodafanil  Current medication regimen, inclusive of sertraline, gabapentin, and armodafinil reviewed and renewed as appropriate.     Diagnoses and all orders for this visit:    1. Generalized anxiety disorder (Primary)    2. Anxiety and depression  -     sertraline (ZOLOFT) 100 MG tablet; Take 1.5 tablets by mouth Daily.  Dispense: 135 tablet; Refill: 0  -     gabapentin (Neurontin) 300 MG capsule; Take 1 capsule by mouth Every Night.  Dispense: 60 capsule; Refill: 2    3. Fatigue, unspecified type  -     Armodafinil 250 MG tablet; Take 1 tablet by mouth Daily.  Dispense: 30 tablet; Refill: 2    4. ANDREW (obstructive sleep apnea)  -     Armodafinil 250 MG tablet; Take 1 tablet by mouth Daily.  Dispense: 30 tablet; Refill: 2

## 2022-11-08 LAB
HCT VFR BLDA CALC: 23 % (ref 38–51)
HGB BLDA-MCNC: 7.8 G/DL (ref 12–17)
SAO2 % BLDA: 98 % (ref 95–98)

## 2022-11-10 ENCOUNTER — INFUSION (OUTPATIENT)
Dept: ONCOLOGY | Facility: HOSPITAL | Age: 62
End: 2022-11-10

## 2022-11-10 ENCOUNTER — LAB (OUTPATIENT)
Dept: LAB | Facility: HOSPITAL | Age: 62
End: 2022-11-10

## 2022-11-10 ENCOUNTER — OFFICE VISIT (OUTPATIENT)
Dept: ONCOLOGY | Facility: CLINIC | Age: 62
End: 2022-11-10

## 2022-11-10 VITALS
TEMPERATURE: 97.1 F | HEART RATE: 58 BPM | BODY MASS INDEX: 45.99 KG/M2 | RESPIRATION RATE: 18 BRPM | WEIGHT: 293 LBS | DIASTOLIC BLOOD PRESSURE: 47 MMHG | OXYGEN SATURATION: 96 % | HEIGHT: 67 IN | SYSTOLIC BLOOD PRESSURE: 95 MMHG

## 2022-11-10 DIAGNOSIS — D63.1 ANEMIA IN STAGE 3 CHRONIC KIDNEY DISEASE, UNSPECIFIED WHETHER STAGE 3A OR 3B CKD: ICD-10-CM

## 2022-11-10 DIAGNOSIS — Z79.899 HIGH RISK MEDICATION USE: ICD-10-CM

## 2022-11-10 DIAGNOSIS — C50.919 METASTATIC BREAST CANCER: Primary | ICD-10-CM

## 2022-11-10 DIAGNOSIS — N18.30 ANEMIA IN STAGE 3 CHRONIC KIDNEY DISEASE, UNSPECIFIED WHETHER STAGE 3A OR 3B CKD: ICD-10-CM

## 2022-11-10 DIAGNOSIS — C50.919 METASTATIC BREAST CANCER: ICD-10-CM

## 2022-11-10 LAB
ALBUMIN SERPL-MCNC: 3.3 G/DL (ref 3.5–5.2)
ALBUMIN/GLOB SERPL: 0.7 G/DL (ref 1.1–2.4)
ALP SERPL-CCNC: 70 U/L (ref 38–116)
ALT SERPL W P-5'-P-CCNC: 7 U/L (ref 0–33)
ANION GAP SERPL CALCULATED.3IONS-SCNC: 10.4 MMOL/L (ref 5–15)
AST SERPL-CCNC: 14 U/L (ref 0–32)
BASOPHILS # BLD AUTO: 0.07 10*3/MM3 (ref 0–0.2)
BASOPHILS NFR BLD AUTO: 2.2 % (ref 0–1.5)
BILIRUB SERPL-MCNC: 0.5 MG/DL (ref 0.2–1.2)
BUN SERPL-MCNC: 21 MG/DL (ref 6–20)
BUN/CREAT SERPL: 4.9 (ref 7.3–30)
CALCIUM SPEC-SCNC: 9.4 MG/DL (ref 8.5–10.2)
CHLORIDE SERPL-SCNC: 96 MMOL/L (ref 98–107)
CO2 SERPL-SCNC: 28.6 MMOL/L (ref 22–29)
CREAT SERPL-MCNC: 4.32 MG/DL (ref 0.6–1.1)
DEPRECATED RDW RBC AUTO: 63.6 FL (ref 37–54)
EGFRCR SERPLBLD CKD-EPI 2021: 10.9 ML/MIN/1.73
EOSINOPHIL # BLD AUTO: 0.17 10*3/MM3 (ref 0–0.4)
EOSINOPHIL NFR BLD AUTO: 5.2 % (ref 0.3–6.2)
ERYTHROCYTE [DISTWIDTH] IN BLOOD BY AUTOMATED COUNT: 17.9 % (ref 12.3–15.4)
GLOBULIN UR ELPH-MCNC: 4.6 GM/DL (ref 1.8–3.5)
GLUCOSE SERPL-MCNC: 137 MG/DL (ref 74–124)
HCT VFR BLD AUTO: 26 % (ref 34–46.6)
HGB BLD-MCNC: 8.1 G/DL (ref 12–15.9)
IMM GRANULOCYTES # BLD AUTO: 0.02 10*3/MM3 (ref 0–0.05)
IMM GRANULOCYTES NFR BLD AUTO: 0.6 % (ref 0–0.5)
LYMPHOCYTES # BLD AUTO: 0.57 10*3/MM3 (ref 0.7–3.1)
LYMPHOCYTES NFR BLD AUTO: 17.6 % (ref 19.6–45.3)
MCH RBC QN AUTO: 30.7 PG (ref 26.6–33)
MCHC RBC AUTO-ENTMCNC: 31.2 G/DL (ref 31.5–35.7)
MCV RBC AUTO: 98.5 FL (ref 79–97)
MONOCYTES # BLD AUTO: 0.23 10*3/MM3 (ref 0.1–0.9)
MONOCYTES NFR BLD AUTO: 7.1 % (ref 5–12)
NEUTROPHILS NFR BLD AUTO: 2.18 10*3/MM3 (ref 1.7–7)
NEUTROPHILS NFR BLD AUTO: 67.3 % (ref 42.7–76)
NRBC BLD AUTO-RTO: 0 /100 WBC (ref 0–0.2)
PLATELET # BLD AUTO: 223 10*3/MM3 (ref 140–450)
PMV BLD AUTO: 8.6 FL (ref 6–12)
POTASSIUM SERPL-SCNC: 3.2 MMOL/L (ref 3.5–4.7)
PROT SERPL-MCNC: 7.9 G/DL (ref 6.3–8)
RBC # BLD AUTO: 2.64 10*6/MM3 (ref 3.77–5.28)
SODIUM SERPL-SCNC: 135 MMOL/L (ref 134–145)
WBC NRBC COR # BLD: 3.24 10*3/MM3 (ref 3.4–10.8)

## 2022-11-10 PROCEDURE — 80053 COMPREHEN METABOLIC PANEL: CPT

## 2022-11-10 PROCEDURE — 96402 CHEMO HORMON ANTINEOPL SQ/IM: CPT

## 2022-11-10 PROCEDURE — 36415 COLL VENOUS BLD VENIPUNCTURE: CPT

## 2022-11-10 PROCEDURE — 25010000002 FULVESTRANT PER 25 MG: Performed by: NURSE PRACTITIONER

## 2022-11-10 PROCEDURE — 99214 OFFICE O/P EST MOD 30 MIN: CPT | Performed by: NURSE PRACTITIONER

## 2022-11-10 PROCEDURE — 85025 COMPLETE CBC W/AUTO DIFF WBC: CPT

## 2022-11-10 RX ORDER — LAMOTRIGINE 25 MG/1
500 TABLET ORAL ONCE
Status: CANCELLED
Start: 2022-11-10 | End: 2022-11-10

## 2022-11-10 RX ORDER — LAMOTRIGINE 25 MG/1
500 TABLET ORAL ONCE
Status: COMPLETED | OUTPATIENT
Start: 2022-11-10 | End: 2022-11-10

## 2022-11-10 RX ADMIN — FULVESTRANT 500 MG: 250 INJECTION INTRAMUSCULAR at 12:25

## 2022-11-10 NOTE — PROGRESS NOTES
"Chief Complaint  Metastatic lobular breast cancer (ER/DE positive, HER-2/tj negative), anemia secondary to CKD3, CHF, peripheral neuropathy, chemotherapy related nausea chemotherapy-induced myelosuppression    Subjective        History of Present Illness  The patient returns today in follow-up continuing on monthly Faslodex due for cycle 13 today and continuing on abemaciclib 150 mg twice daily.  Patient reports she has still had occasional diarrhea on the abemaciclib but is not needing Imodium daily.  She reports not noticing a change in her bowels after increasing the dose.  She still has diminished appetite but has not noticed a change since increasing the dose of the abemaciclib and nor has any increased nausea.  She was seen by cardiology on 10/25/2022 for further discussions regarding her aortic stenosis.  She is quite puzzled by the visit however as she was told that her life expectancy precludes any procedures to repair her aortic stenosis.  She will follow-up with cardiology in January for further discussion once again.        Objective   Vital Signs:   BP 95/47   Pulse 58   Temp 97.1 °F (36.2 °C) (Temporal)   Resp 18   Ht 170.2 cm (67\") Comment: pt stated HT  Wt (!) 164 kg (361 lb 8 oz) Comment: pt stated WT unable to stand  SpO2 96%   BMI 56.62 kg/m²     Physical Exam  Constitutional:       Appearance: She is well-developed. She is obese.      Comments: Patient is in a motorized scooter today   Eyes:      Conjunctiva/sclera: Conjunctivae normal.   Cardiovascular:      Rate and Rhythm: Normal rate and regular rhythm.      Heart sounds: Murmur heard.     No friction rub. No gallop.      Comments: 2/6 murmur  Pulmonary:      Effort: No respiratory distress.      Breath sounds: Normal breath sounds.   Abdominal:      General: Bowel sounds are normal. There is no distension.      Palpations: Abdomen is soft.      Tenderness: There is no abdominal tenderness.   Musculoskeletal:         General: Swelling " present.      Comments: 1+ bilateral lower extremity edema with venous stasis changes noted.  Left upper extremity fistula   Lymphadenopathy:      Head:      Right side of head: No submandibular adenopathy.      Cervical: No cervical adenopathy.      Upper Body:      Right upper body: No supraclavicular adenopathy.      Left upper body: No supraclavicular adenopathy.   Skin:     General: Skin is warm and dry.      Findings: No bruising or rash.   Neurological:      Mental Status: She is alert and oriented to person, place, and time.      Cranial Nerves: No cranial nerve deficit.   Psychiatric:         Behavior: Behavior normal.          Result Review : Reviewed CBC, CMP from today       Assessment and Plan    *Metastatic lobular breast cancer (ER/GA positive, HER-2/tj negative):  · Previous stage IIIC right breast cancer in 2003, received neoadjuvant chemotherapy (unknown regimen) with subsequent bilateral mastectomies 1/22/2004 with right axillary dissection.  Residual 10-12 cm invasive lobular carcinoma on the right breast with 14/16+ nodes with extranodal extension.  Received adjuvant carboplatin and Navelbine chemotherapy x4 cycles  · Attempted adjuvant radiation to the chest wall however interrupted due to cellulitis that required removal of implants in August 2004  · Received tamoxifen from 6/22/2004 until June 2009.  Transitioned to Femara and received until June 2014  · Identification of CT findings concerning for carcinomatosis on CT scans and June 2019.  · PET scan confirmed hypermetabolic activity in the omentum as well as small retroperitoneal lymph nodes.  · CT-guided omental biopsy 7/30/2019 with metastatic lobular carcinoma of breast primary, ER positive (greater than 95%), GA positive (90%), HER-2/tj negative (1+ IHC)  · [facility] liquid biopsy 2/5/2020: MSI undetermined, mutations in BETH, NF1, CHEK2.  No evidence of PIK3CA mutation.  · Subsequent [facility] germline testing 11/12/2021 with  BETH VUS (c.2864C>A) and POLE VUS (c.631A>T)  · Initiation of Aromasin and Ibrance in August 2019  · Difficulty with myelosuppression related to Ibrance requiring dose alterations, eventually changed to 100 mg for 7 days on followed by 7 days off.  · CT chest abdomen pelvis 10/26/2021 with increase in left hydronephrosis with increase in left perinephric and periureteral stranding. Decrease in stone in the left kidney lower pole (7 mm).  Stable small retroperitoneal lymph and left periaortic lymph nodes.  · PET scan 11/10/2021 with multiple bilateral hypermetabolic nodular pulmonary lesions, asymmetric moderate to severe left hydronephrosis with ill-defined fat stranding and soft tissue thickening in the renal sinus and surrounding the left ureter, hypermetabolic left retroperitoneal lymph node with slight increase in size, ill-defined hypermetabolic thickening in the inferior omentum with increase in size, uptake and C7 (indeterminate, recommended MRI).  Short segments of uptake in the mid and distal esophagus possibly related to gastritis.  · PET scan findings felt to be consistent with progressive malignancy.  Patient declined repeat omental biopsy.   · Options for further treatment discussed on 11/12/2021.  In light of renal failure and dialysis, options are more limited.  Recommendation to continue Ibrance and discontinue Aromasin, begin Faslodex.  Discussed possible future use of single agent Taxol.    · Aromasin discontinued 11/12/2021  · On 11/22/2021 initiation of Faslodex with continuation of Ibrance (100 mg daily for 7 days on followed by 7 days off).  · MRI cervical spine 11/18/2021 showed the right C7 normality to likely represent metastatic disease.  With solitary bone lesion, did not recommend pursuit of bone modifying agent.  · Following 2 cycles Faslodex/Ibrance, PET scan on 1/11/2022 showed no change in the soft tissue superior to the dome of the bladder with hypermetabolic activity (likely related  to carcinomatosis).  Significant decrease in injection of fat around the left renal pelvis and stable retroperitoneal hypermetabolic lymph nodes, decrease in size and activity in small bilateral pulmonary nodules.  Findings felt to represent evidence of response to treatment.    · Ibrance held briefly beginning 1/28/2022 due to hospitalization with COVID-19 infection and C. difficile colitis.  Patient developed leukopenia/neutropenia.  Ibrance resumed at previous dosing (100 mg daily 7 days on followed by 7 days off) on 2/9/22.  · Following 5 cycles Faslodex/Ibrance PET scan 4/19/2022 with evidence of mixed response.  New hypermetabolic lesion spinous process L2 (SUV 5.1) and slight increase in activity left clavicular metastasis (SUV increased 4.6-8.1).  C7 hypermetabolic mixed lytic and blastic bone lesion was unchanged.  Decrease in uptake involving omental thickening.  Stable soft tissue thickening around the left renal pelvis and ureter.  Discussion again regarding potential pursuit of IV chemotherapy with Taxol versus continuation of Ibrance and Faslodex with radiation to sites of bony disease at L2, left clavicle, C7.  Patient opted to defer initiation of systemic chemotherapy and continue Ibrance/Faslodex with consideration of radiation.  · Initiated monthly Xgeva on 5/19/2022 (cleared with nephrology).  Xgeva subsequently held due to significant hypocalcemia.  Decision made to forego further Xgeva with persistent hypocalcemia.  · Palliative radiation received to lumbar spine regarding L2 metastasis 5/23- 6/7/2022  · Ibrance held during radiation therapy beginning 5/22/2022.  Ibrance resumed 6/16/2022.  · PET scan 7/21/2022 with stable hypermetabolic abdominal pelvic lymph nodes and subcarinal lymph node, stable bone lesions at C7, left clavicle.  Stable soft tissue thickening around the left renal pelvis with left hydronephrosis.  Uptake in adrenal glands felt to be likely reactive (no change in size).  No  activity noted at L2 (previously radiated).  New hypermetabolic lesion right anterior sixth rib.  · With evidence of further slight progression in bone on PET scan (new right sixth rib lesion), on 7/28/2022 discontinued Ibrance and plan to transition to abemaciclib with continuation of Faslodex.    · On 8/4/2022 initiated abemaciclib at reduced dose of 50 mg twice daily.  · On 8/25/2022, increased abemaciclib dose to 100 mg twice daily  · PET scan 10/6/2022 with stable findings with exception of left acetabular activity report noted new activity however on prior PET scan question of focal activity present previously.  No corresponding CT abnormality and significant increase in SUV at sacral lesion.  Scan otherwise stable.  Decision to continue current treatment  · Abemaciclib dose increased on 10/13/2022 up to 150 mg twice daily  · Patient returns today in follow-up due for cycle 13 Faslodex 500 mg IM every 4 weeks and continuing on abemaciclib 150 mg twice daily that was initiated 8//22 at 50 mg daily with dose increase on 8/25/2020 2 to 100 mg twice daily, and further increase to 150 mg twice daily on 10/13/2022.  Patient is tolerating the dose increase well with no overt changes in her stool patterns.  She denies increased fatigue or nausea.  We will plan a 3-month interval repeat PET scan from her previous.  She will return in 4 weeks for NP visit and cycle14 Faslodex.  In 9 weeks she will undergo PET scan and and will see Dr. Irvin in 8 weeks when she is due for cycle 15 Faslodex pending scan results.    *Anemia secondary to ESRD, underlying malignancy/chronic disease, myelosuppression from CDK 4/6 inhibitor, GI blood loss:  · Anemia secondary to ESRD, malignancy with exacerbation by use of Ibrance  · Previous evidence of folate deficiency 8/12/2019 with level 3.84, initiated folic acid 1 mg daily  · Previous evidence of iron deficiency, received Injectafer on 8/7/2020 750 mg IV x1 dose.  Second dose not  administered due to onset of fever, subsequent iron studies precluded further administration of IV iron.  · Previous low normal B12 level initiated oral B12 1000 mcg daily.  · Patient had previously received Procrit and required intermittent transfusion support  · Following initiation of hemodialysis, management of BEAU transitioned to nephrology, receiving Epogen with dialysis.  · Ongoing transfusion support prompted alteration in Ibrance dosing on 8/23/2021.  · After alteration in Ibrance dosing, hemoglobin improved and remained stable in the 9-10 range.  · Improved but ongoing intermittent need for transfusion support despite Ibrance dose alteration  · Stool negative for occult blood x3 on 11/2/2021  · Patient with reduced dialysis frequency to 2 days/week with concurrent decrease in Epogen dosing corresponding again to increased transfusion requirement.  · Epogen dose increased per nephrology to 20,000 units twice weekly with dialysis on Mondays and Fridays  · Ibrance again exacerbating anemia with ongoing intermittent transfusion requirements.  Ibrance subsequently discontinued on 7/28/2022 and patient initiated abemaciclib on 8/4/2022 which may be less myelosuppressive.  · Additional transfusions required with hemoglobin on 3/31/2022 6.6, hemoglobin 4/21/2022 of 6.4, hemoglobin on 5/5/2022 of 6.8, hemoglobin 6.4 on 7/14/2022, hemoglobin 6.4 on 7/28/2022, hemoglobin 6.8 on 8/18/2022.  · Patient did develop transient visible blood in stool in July 2022  · Anemia evaluation 7/28/2022 with iron 32, ferritin 412, iron saturation 15%, TIBC 210, folate 19.9, B12 925, haptoglobin 300, .  · Stool positive for occult blood x3 on 8/1/2022  · Patient was seen by GI on 8/16/2022, felt to be high risk for endoscopic evaluation and elected to forego EGD/colonoscopy for now  · Patient transitioned from hemodialysis in clinic to home hemodialysis 5 days/week x 2 hours beginning 8/29/2022.  With transition to home  dialysis, nephrology transition the patient from Epogen to Mircera administered every 4 weeks in their office, initiated 8/22/2022.  · Patient currently continues Mircera every 4 weeks.  She did need a blood transfusion after hemoglobin on 10/27/2022 was 6.6.  · Hemoglobin today 8.1    *ESRD on HD:  · Patient with previous CKD3/4  · Hospitalization 4/29-5/4/2021 with RYAN/CKD, UTI, anemia.  Required initiation of hemodialysis Tuesday Thursday Saturday.   · Decrease in frequency of dialysis to twice per week.  She continues to produce a significant amount of urine.   · Status post left upper extremity AV fistula placement on 11/3/2021 by vascular surgery  · Attempt to hold further dialysis on 1/11/2022 with close monitoring of her laboratory and clinical parameters.  Dialysis quickly resumed due to development of hyperkalemia.  · Patient was undergoing dialysis 2 days/week on Mondays and Fridays.    · On 8/22/2022 patient transitioned to use of home dialysis device 5 days/week x 2 hours.    *CHF with severe aortic stenosis:  · Echocardiogram 7/12/2019 showing ejection fraction 48% with moderate dilation of the LV cavity, global hypokinesis, grade 2 diastolic dysfunction, mild to moderate aortic stenosis, trivial pericardial effusion.  · Echocardiogram 7/19/2021 with ejection fraction 56%, left atrial volume moderately increased, grade 2 diastolic dysfunction, severe calcification aortic valve, moderate aortic stenosis, severe mitral calcification, mild mitral stenosis, marked elevation in RVSP from tricuspid regurgitation.  · Echocardiogram on 7/13/2022 with ejection fraction 56-60%, grade 2 diastolic dysfunction, severe aortic stenosis.    · Cardiac catheterization 8/23/2022 showed normal coronary arteries, mild to moderate pulmonary hypertension.    · She was evaluated by cardiothoracic surgery Dr. Pagan and there was discussion regarding potential candidacy for TAVR although there was concern given her underlying  comorbidities including her metastatic breast cancer.  I discussed patient's prognosis with Dr. Pagan.  She does have opportunities for additional treatment of her malignancy with intravenous chemotherapy and is willing to pursue this in the future when needed.  It is unclear as to her expected survival however given her multiple severe comorbidities with metastatic breast cancer, ESRD on dialysis, severe aortic stenosis, severe anemia.  There is consideration of possible pursuit of balloon valvuloplasty initially to assess her symptomatic response and then consider pursuit of TAVR in the future.   · Patient is awaiting further decision from cardiology as to recommendations for further management.  She was seen on 10/25/2022 but reports she was told that her life expectancy is not long enough for procedure.  She will follow-up in January 2023 and discussed once again.    *Left upper and bilateral lower extremity peripheral neuropathy:  · Patient reports that left upper extremity neuropathy was not present from previous chemotherapy but occurred after hospitalization that required intubation and critical care stay.  Apparently felt to represent critical care neuropathy.  Improved over time.  · Bilateral lower extremity neuropathy felt to be related to diabetes  · May have future implications regarding treatment for breast cancer.  · Patient reports that peripheral neuropathy symptoms continue in the bilateral lower extremities, unchanged.  This will be a consideration with potential future use of Taxol    *Uterine/endometrial activity on PET scan:  · Patient experienced postmenopausal vaginal bleeding in 2013, negative endometrial biopsy and gynecologic evaluation.  · With findings on PET scan with enlarging uterus and some hypermetabolic activity, referred back to Dr. Oliveros in gynecology.    · 9/19/2019 D&C with hysteroscopy by Dr. Oliveros, no significant intraoperative findings, pathologic results with benign  findings, no evidence of malignancy.    *Nausea:  · Mild, relieved with Zofran.   · Patient experienced improvement in nausea after initiating hemodialysis  · No recent nausea    *Myelosuppression secondary to CDK 4/6 inhibitor:  · Patient was able to maintain adequate WBC after dosing alteration on Ibrance to treatment at 100 mg daily for 7 days on followed by 7 days off.  · Subsequent transition from Ibrance to abemaciclib  · Today, WBC 3.24    *Depression  · Patient with history of depression, worsened after the death of her son in November 2021  · With evidence of progressive malignancy in November 2021, patient agreeable to referral to supportive oncology  · Patient currently receiving gabapentin 300 mg nightly, Zoloft 150 mg daily, armodafinil 250 mg daily  · Patient does continue with difficulties regarding depression that wax and wane.  Currently symptoms under fair control.  Patient last seen by supportive oncology on 9/26/2022.    *Left perinephric stranding secondary to malignancy with hydronephrosis:  · CT 4/22/2021 showed interval enlargement of 2 staghorn calculi in the left kidney with persistent left perinephric stranding  · Hospitalization 4/29-5/4/2021 with RYAN/CKD, UTI, anemia.  Required 2 unit PRBC transfusion and initiated hemodialysis Tuesday Thursday Saturday.    · Discussion from urology regarding potential need for surgical procedure to address staghorn calculus left kidney  · CT scan 7/2/2021 with only 1 remaining left renal stone identified which had decreased in size.  Patient appears to have passed the other stone.  · As above, CT 10/26/2021 with more prominent left hydronephrosis and increase in left perinephric and periureteral stranding.  Felt to possibly be related to passage of recent stone versus partial obstruction secondary to debris or thrombus in the left ureter versus pyelonephritis.  Patient however with no clinical evidence of recent stone passage nor pyelonephritis.  Malignancy felt to be the cause of CT changes around the left kidney at this point.   · Left ureteral stent replacement 12/16/2021  · PET scan 1/11/2022 with decrease in fat injection near left renal pelvis, stent in satisfactory position.  Mild residual hydronephrosis on the left.  · Ureteral stent replaced on 3/10/2022  · PET scan 4/19/2022 with no hydronephrosis, left ureteral stent in place  · PET scan 7/21/2022 with persistent left hydronephrosis, ureteral stent in place  · PET scan 10/6/2022 with left nephroureteral stent in place, overall stable findings    *Right thyroid nodules  · Patient underwent prior thyroid biopsy by her report with benign findings many years ago, records not available  · On MRI cervical spine 11/18/2021, finding of 1.8 cm right thyroid nodule  · Thyroid ultrasound 11/26/2021 with right-sided thyroid nodules, 1 nodule was hypoechoic, solid measuring 2 cm with biopsy recommended.  · Thyroid ultrasound results reviewed with patient.  In light of her metastatic breast cancer, renal failure the significance of the thyroid nodule is felt to be much less.  We discussed possibility of thyroid biopsy however patient is inclined to forego this, especially since she has had a biopsy performed in the past with benign findings by her report.    · No hypermetabolic activity identified on PET scan 1/11/2022 in the neck    *Hospitalization 1/28-1/30/2022 due to COVID-19 infection and C. difficile colitis  · Patient fully vaccinated with 3 doses Moderna COVID-19 vaccine  · Patient developed symptoms 1/26/2022 with abrupt onset sinus congestion, mild cough, subsequently developed nausea and diarrhea on 1/27/2022.  Significant fatigue.  · Patient tested positive in emergency department on 1/28/2022 and was admitted  · Patient maintained O2 saturation in mid 90% range on room air  · Patient received remdesivir  · Patient was also found to be positive for C. difficile colitis, received course of oral  vancomycin.  · Symptoms from both COVID-19 and C. difficile colitis ultimately resolved.    *Hypocalcemia  · Patient developed significant hypocalcemia after receiving Xgeva on 5/19/2022  · Calcium management per nephrology  · No further Xgeva planned due to persistent significant hypocalcemia  · Calcium today 9.4.    Plan:  1. Patient will proceed with Faslodex 500 mg IM injection today and continue every 28 days (today is cycle 13)  2. Continue abemaciclib dose 150 mg twice daily (patient will not receive the 150 mg dose until next week)  3. Continue oral folic acid 1 mg daily, B12 1000 mcg daily.  4. The patient is continuing with home dialysis device 5 days/week x 2 hours.  5. Patient is receiving Mircera under the direction of nephrology, dosing every 4 weeks (due next on 10/18/2022).  6. Patient is continuing follow-up with cardiology and cardiothoracic surgery regarding management of severe aortic stenosis.  Await recommendations regarding possible TAVR versus initial balloon valvuloplasty.  Patient scheduled to be seen by cardiology in January 2023.  7. Continue to monitor hemoglobin closely regarding ongoing transfusion needs for hemoglobin less than 7.0.  8. In 2 weeks CBC and RN review  9. In 4 weeks nurse practitioner visit with CBC, CMP and Faslodex  10. In 6 weeks CBC and RN review  11. In 7 weeks PET scan  12. In 8 weeks MD visit with CBC, CMP and Faslodex pending PET scan results.    Patient continues on high risk medication requiring intensive monitoring.

## 2022-11-15 ENCOUNTER — DOCUMENTATION (OUTPATIENT)
Dept: CARDIOLOGY | Facility: CLINIC | Age: 62
End: 2022-11-15

## 2022-11-15 NOTE — PROGRESS NOTES
11/15/22         The patient and I have reviewed the shared decision making a tool for oral anticoagulation in atrial fibrillation.  After discussion, the patient has decided to pursue left atrial appendage occlusion device implantation.  He/she has been deemed to be a suitable candidate for short-term anticoagulation therapy, but unable to take long-term anticoagulation secondary to ***

## 2022-11-19 DIAGNOSIS — E78.2 MIXED HYPERLIPIDEMIA: ICD-10-CM

## 2022-11-21 RX ORDER — FOLIC ACID 1 MG/1
TABLET ORAL
Qty: 30 TABLET | Refills: 2 | Status: SHIPPED | OUTPATIENT
Start: 2022-11-21 | End: 2023-03-03

## 2022-11-21 NOTE — TELEPHONE ENCOUNTER
Rx Refill Note  Requested Prescriptions     Pending Prescriptions Disp Refills   • atorvastatin (LIPITOR) 40 MG tablet [Pharmacy Med Name: ATORVASTATIN 40 MG TABLET] 90 tablet 1     Sig: TAKE ONE TABLET BY MOUTH ONCE NIGHTLY      Last office visit with prescribing clinician: 2/15/2022      Next office visit with prescribing clinician: Visit date not found

## 2022-11-21 NOTE — TELEPHONE ENCOUNTER
Caller: Juana Hall    Relationship to patient: Self    Best call back number: [phone]    Chief complaint: R/S    Type of visit: RN REVIEW, LAB    Requested date: AFTER THANKSGIVING     If rescheduling, when is the original appointment: 11-23    Additional notes:PLEASE ADVISE

## 2022-11-23 ENCOUNTER — APPOINTMENT (OUTPATIENT)
Dept: LAB | Facility: HOSPITAL | Age: 62
End: 2022-11-23

## 2022-11-23 ENCOUNTER — APPOINTMENT (OUTPATIENT)
Dept: ONCOLOGY | Facility: HOSPITAL | Age: 62
End: 2022-11-23

## 2022-11-23 RX ORDER — ATORVASTATIN CALCIUM 40 MG/1
TABLET, FILM COATED ORAL
Qty: 30 TABLET | Refills: 0 | Status: SHIPPED | OUTPATIENT
Start: 2022-11-23 | End: 2022-12-26

## 2022-11-28 ENCOUNTER — LAB (OUTPATIENT)
Dept: LAB | Facility: HOSPITAL | Age: 62
End: 2022-11-28

## 2022-11-28 ENCOUNTER — CLINICAL SUPPORT (OUTPATIENT)
Dept: ONCOLOGY | Facility: HOSPITAL | Age: 62
End: 2022-11-28

## 2022-11-28 DIAGNOSIS — C50.919 METASTATIC BREAST CANCER: ICD-10-CM

## 2022-11-28 LAB
BASOPHILS # BLD AUTO: 0.05 10*3/MM3 (ref 0–0.2)
BASOPHILS NFR BLD AUTO: 1.8 % (ref 0–1.5)
DEPRECATED RDW RBC AUTO: 76.9 FL (ref 37–54)
EOSINOPHIL # BLD AUTO: 0.36 10*3/MM3 (ref 0–0.4)
EOSINOPHIL NFR BLD AUTO: 12.9 % (ref 0.3–6.2)
ERYTHROCYTE [DISTWIDTH] IN BLOOD BY AUTOMATED COUNT: 20.5 % (ref 12.3–15.4)
HCT VFR BLD AUTO: 24.9 % (ref 34–46.6)
HGB BLD-MCNC: 7.8 G/DL (ref 12–15.9)
IMM GRANULOCYTES # BLD AUTO: 0.02 10*3/MM3 (ref 0–0.05)
IMM GRANULOCYTES NFR BLD AUTO: 0.7 % (ref 0–0.5)
LYMPHOCYTES # BLD AUTO: 0.66 10*3/MM3 (ref 0.7–3.1)
LYMPHOCYTES NFR BLD AUTO: 23.7 % (ref 19.6–45.3)
MCH RBC QN AUTO: 32.6 PG (ref 26.6–33)
MCHC RBC AUTO-ENTMCNC: 31.3 G/DL (ref 31.5–35.7)
MCV RBC AUTO: 104.2 FL (ref 79–97)
MONOCYTES # BLD AUTO: 0.17 10*3/MM3 (ref 0.1–0.9)
MONOCYTES NFR BLD AUTO: 6.1 % (ref 5–12)
NEUTROPHILS NFR BLD AUTO: 1.53 10*3/MM3 (ref 1.7–7)
NEUTROPHILS NFR BLD AUTO: 54.8 % (ref 42.7–76)
NRBC BLD AUTO-RTO: 0 /100 WBC (ref 0–0.2)
PLATELET # BLD AUTO: 143 10*3/MM3 (ref 140–450)
PMV BLD AUTO: 8.9 FL (ref 6–12)
RBC # BLD AUTO: 2.39 10*6/MM3 (ref 3.77–5.28)
WBC NRBC COR # BLD: 2.79 10*3/MM3 (ref 3.4–10.8)

## 2022-11-28 PROCEDURE — G0463 HOSPITAL OUTPT CLINIC VISIT: HCPCS

## 2022-11-28 PROCEDURE — 85025 COMPLETE CBC W/AUTO DIFF WBC: CPT

## 2022-11-28 PROCEDURE — 36415 COLL VENOUS BLD VENIPUNCTURE: CPT

## 2022-12-01 DIAGNOSIS — E11.9 CONTROLLED TYPE 2 DIABETES MELLITUS WITHOUT COMPLICATION, WITH LONG-TERM CURRENT USE OF INSULIN: ICD-10-CM

## 2022-12-01 DIAGNOSIS — Z79.4 CONTROLLED TYPE 2 DIABETES MELLITUS WITHOUT COMPLICATION, WITH LONG-TERM CURRENT USE OF INSULIN: ICD-10-CM

## 2022-12-01 RX ORDER — INSULIN DETEMIR 100 [IU]/ML
50 INJECTION, SOLUTION SUBCUTANEOUS DAILY
Qty: 30 ML | Refills: 0 | Status: SHIPPED | OUTPATIENT
Start: 2022-12-01 | End: 2023-03-21 | Stop reason: SDUPTHER

## 2022-12-01 NOTE — TELEPHONE ENCOUNTER
Rx Refill Note  Requested Prescriptions     Pending Prescriptions Disp Refills   • Levemir FlexTouch 100 UNIT/ML injection [Pharmacy Med Name: LEVEMIR FLEXTOUCH 100 UNIT/ML] 30 mL      Sig: INJECT 40 UNITS UNDER THE SKIN INTO THE APPROPRIATE AREA DAILY AS DIRECTED      Last office visit with prescribing clinician: 2/15/2022   Next office visit with prescribing clinician: Visit date not found     DUE FOR AN APPOINTMENT

## 2022-12-08 ENCOUNTER — OFFICE VISIT (OUTPATIENT)
Dept: ONCOLOGY | Facility: CLINIC | Age: 62
End: 2022-12-08

## 2022-12-08 ENCOUNTER — INFUSION (OUTPATIENT)
Dept: ONCOLOGY | Facility: HOSPITAL | Age: 62
End: 2022-12-08

## 2022-12-08 ENCOUNTER — LAB (OUTPATIENT)
Dept: LAB | Facility: HOSPITAL | Age: 62
End: 2022-12-08

## 2022-12-08 VITALS
HEART RATE: 56 BPM | RESPIRATION RATE: 18 BRPM | DIASTOLIC BLOOD PRESSURE: 39 MMHG | OXYGEN SATURATION: 95 % | TEMPERATURE: 96.9 F | BODY MASS INDEX: 45.99 KG/M2 | SYSTOLIC BLOOD PRESSURE: 70 MMHG | HEIGHT: 67 IN | WEIGHT: 293 LBS

## 2022-12-08 DIAGNOSIS — D63.1 ANEMIA IN STAGE 3 CHRONIC KIDNEY DISEASE, UNSPECIFIED WHETHER STAGE 3A OR 3B CKD: ICD-10-CM

## 2022-12-08 DIAGNOSIS — C50.919 METASTATIC BREAST CANCER: ICD-10-CM

## 2022-12-08 DIAGNOSIS — C50.919 METASTATIC BREAST CANCER: Primary | ICD-10-CM

## 2022-12-08 DIAGNOSIS — Z79.899 HIGH RISK MEDICATION USE: ICD-10-CM

## 2022-12-08 DIAGNOSIS — N18.30 ANEMIA IN STAGE 3 CHRONIC KIDNEY DISEASE, UNSPECIFIED WHETHER STAGE 3A OR 3B CKD: ICD-10-CM

## 2022-12-08 LAB
ABO GROUP BLD: NORMAL
ALBUMIN SERPL-MCNC: 3 G/DL (ref 3.5–5.2)
ALBUMIN/GLOB SERPL: 0.7 G/DL (ref 1.1–2.4)
ALP SERPL-CCNC: 65 U/L (ref 38–116)
ALT SERPL W P-5'-P-CCNC: 9 U/L (ref 0–33)
ANION GAP SERPL CALCULATED.3IONS-SCNC: 11 MMOL/L (ref 5–15)
ANTI-E: NORMAL
AST SERPL-CCNC: 16 U/L (ref 0–32)
BASOPHILS # BLD AUTO: 0.06 10*3/MM3 (ref 0–0.2)
BASOPHILS NFR BLD AUTO: 2 % (ref 0–1.5)
BILIRUB SERPL-MCNC: 0.4 MG/DL (ref 0.2–1.2)
BLD GP AB SCN SERPL QL: POSITIVE
BUN SERPL-MCNC: 27 MG/DL (ref 6–20)
BUN/CREAT SERPL: 5.8 (ref 7.3–30)
CALCIUM SPEC-SCNC: 9.2 MG/DL (ref 8.5–10.2)
CHLORIDE SERPL-SCNC: 98 MMOL/L (ref 98–107)
CO2 SERPL-SCNC: 28 MMOL/L (ref 22–29)
CREAT SERPL-MCNC: 4.69 MG/DL (ref 0.6–1.1)
DEPRECATED RDW RBC AUTO: 68.6 FL (ref 37–54)
EGFRCR SERPLBLD CKD-EPI 2021: 9.9 ML/MIN/1.73
EOSINOPHIL # BLD AUTO: 0.21 10*3/MM3 (ref 0–0.4)
EOSINOPHIL NFR BLD AUTO: 7.1 % (ref 0.3–6.2)
ERYTHROCYTE [DISTWIDTH] IN BLOOD BY AUTOMATED COUNT: 18.6 % (ref 12.3–15.4)
GLOBULIN UR ELPH-MCNC: 4.6 GM/DL (ref 1.8–3.5)
GLUCOSE SERPL-MCNC: 112 MG/DL (ref 74–124)
HCT VFR BLD AUTO: 22.4 % (ref 34–46.6)
HGB BLD-MCNC: 7.1 G/DL (ref 12–15.9)
IMM GRANULOCYTES # BLD AUTO: 0.01 10*3/MM3 (ref 0–0.05)
IMM GRANULOCYTES NFR BLD AUTO: 0.3 % (ref 0–0.5)
LYMPHOCYTES # BLD AUTO: 0.57 10*3/MM3 (ref 0.7–3.1)
LYMPHOCYTES NFR BLD AUTO: 19.2 % (ref 19.6–45.3)
MCH RBC QN AUTO: 32 PG (ref 26.6–33)
MCHC RBC AUTO-ENTMCNC: 31.7 G/DL (ref 31.5–35.7)
MCV RBC AUTO: 100.9 FL (ref 79–97)
MONOCYTES # BLD AUTO: 0.24 10*3/MM3 (ref 0.1–0.9)
MONOCYTES NFR BLD AUTO: 8.1 % (ref 5–12)
NEUTROPHILS NFR BLD AUTO: 1.88 10*3/MM3 (ref 1.7–7)
NEUTROPHILS NFR BLD AUTO: 63.3 % (ref 42.7–76)
NRBC BLD AUTO-RTO: 0 /100 WBC (ref 0–0.2)
PLATELET # BLD AUTO: 146 10*3/MM3 (ref 140–450)
PMV BLD AUTO: 9.4 FL (ref 6–12)
POTASSIUM SERPL-SCNC: 3.4 MMOL/L (ref 3.5–4.7)
PROT SERPL-MCNC: 7.6 G/DL (ref 6.3–8)
RBC # BLD AUTO: 2.22 10*6/MM3 (ref 3.77–5.28)
RH BLD: POSITIVE
SODIUM SERPL-SCNC: 137 MMOL/L (ref 134–145)
T&S EXPIRATION DATE: NORMAL
WBC NRBC COR # BLD: 2.97 10*3/MM3 (ref 3.4–10.8)

## 2022-12-08 PROCEDURE — 86900 BLOOD TYPING SEROLOGIC ABO: CPT | Performed by: NURSE PRACTITIONER

## 2022-12-08 PROCEDURE — 25010000002 FULVESTRANT PER 25 MG: Performed by: NURSE PRACTITIONER

## 2022-12-08 PROCEDURE — 85025 COMPLETE CBC W/AUTO DIFF WBC: CPT

## 2022-12-08 PROCEDURE — 86870 RBC ANTIBODY IDENTIFICATION: CPT | Performed by: NURSE PRACTITIONER

## 2022-12-08 PROCEDURE — 86920 COMPATIBILITY TEST SPIN: CPT

## 2022-12-08 PROCEDURE — 96402 CHEMO HORMON ANTINEOPL SQ/IM: CPT

## 2022-12-08 PROCEDURE — 86850 RBC ANTIBODY SCREEN: CPT | Performed by: NURSE PRACTITIONER

## 2022-12-08 PROCEDURE — 80053 COMPREHEN METABOLIC PANEL: CPT

## 2022-12-08 PROCEDURE — 99214 OFFICE O/P EST MOD 30 MIN: CPT | Performed by: NURSE PRACTITIONER

## 2022-12-08 PROCEDURE — 86901 BLOOD TYPING SEROLOGIC RH(D): CPT | Performed by: NURSE PRACTITIONER

## 2022-12-08 PROCEDURE — 86902 BLOOD TYPE ANTIGEN DONOR EA: CPT

## 2022-12-08 PROCEDURE — 86922 COMPATIBILITY TEST ANTIGLOB: CPT

## 2022-12-08 PROCEDURE — 36415 COLL VENOUS BLD VENIPUNCTURE: CPT

## 2022-12-08 RX ORDER — SODIUM CHLORIDE 9 MG/ML
250 INJECTION, SOLUTION INTRAVENOUS AS NEEDED
Status: CANCELLED | OUTPATIENT
Start: 2022-12-09

## 2022-12-08 RX ORDER — LAMOTRIGINE 25 MG/1
500 TABLET ORAL ONCE
Status: CANCELLED
Start: 2022-12-08 | End: 2022-12-08

## 2022-12-08 RX ORDER — ACETAMINOPHEN 325 MG/1
650 TABLET ORAL ONCE
Status: CANCELLED | OUTPATIENT
Start: 2022-12-09 | End: 2022-12-08

## 2022-12-08 RX ORDER — DIPHENHYDRAMINE HCL 25 MG
25 CAPSULE ORAL ONCE
Status: CANCELLED | OUTPATIENT
Start: 2022-12-09 | End: 2022-12-08

## 2022-12-08 RX ORDER — LAMOTRIGINE 25 MG/1
500 TABLET ORAL ONCE
Status: COMPLETED | OUTPATIENT
Start: 2022-12-08 | End: 2022-12-08

## 2022-12-08 RX ORDER — ONDANSETRON HYDROCHLORIDE 8 MG/1
8 TABLET, FILM COATED ORAL EVERY 8 HOURS PRN
Qty: 30 TABLET | Refills: 2 | Status: SHIPPED | OUTPATIENT
Start: 2022-12-08

## 2022-12-08 RX ADMIN — FULVESTRANT 500 MG: 250 INJECTION INTRAMUSCULAR at 10:18

## 2022-12-09 ENCOUNTER — HOSPITAL ENCOUNTER (OUTPATIENT)
Dept: INFUSION THERAPY | Facility: HOSPITAL | Age: 62
Setting detail: INFUSION SERIES
Discharge: HOME | End: 2022-12-09

## 2022-12-09 VITALS
TEMPERATURE: 96.8 F | RESPIRATION RATE: 22 BRPM | DIASTOLIC BLOOD PRESSURE: 69 MMHG | SYSTOLIC BLOOD PRESSURE: 128 MMHG | OXYGEN SATURATION: 99 % | HEART RATE: 57 BPM

## 2022-12-09 DIAGNOSIS — Z79.899 HIGH RISK MEDICATION USE: ICD-10-CM

## 2022-12-09 DIAGNOSIS — C50.919 METASTATIC BREAST CANCER: ICD-10-CM

## 2022-12-09 DIAGNOSIS — D63.1 ANEMIA IN STAGE 3 CHRONIC KIDNEY DISEASE, UNSPECIFIED WHETHER STAGE 3A OR 3B CKD: ICD-10-CM

## 2022-12-09 DIAGNOSIS — N18.30 ANEMIA IN STAGE 3 CHRONIC KIDNEY DISEASE, UNSPECIFIED WHETHER STAGE 3A OR 3B CKD: ICD-10-CM

## 2022-12-09 PROCEDURE — 63710000001 DIPHENHYDRAMINE PER 50 MG: Performed by: NURSE PRACTITIONER

## 2022-12-09 PROCEDURE — 36430 TRANSFUSION BLD/BLD COMPNT: CPT

## 2022-12-09 PROCEDURE — P9016 RBC LEUKOCYTES REDUCED: HCPCS

## 2022-12-09 PROCEDURE — 86900 BLOOD TYPING SEROLOGIC ABO: CPT

## 2022-12-09 RX ORDER — DIPHENHYDRAMINE HCL 25 MG
25 CAPSULE ORAL ONCE
Status: COMPLETED | OUTPATIENT
Start: 2022-12-09 | End: 2022-12-09

## 2022-12-09 RX ORDER — ACETAMINOPHEN 325 MG/1
650 TABLET ORAL ONCE
Status: COMPLETED | OUTPATIENT
Start: 2022-12-09 | End: 2022-12-09

## 2022-12-09 RX ORDER — SODIUM CHLORIDE 9 MG/ML
250 INJECTION, SOLUTION INTRAVENOUS AS NEEDED
Status: DISCONTINUED | OUTPATIENT
Start: 2022-12-09 | End: 2022-12-11 | Stop reason: HOSPADM

## 2022-12-09 RX ADMIN — DIPHENHYDRAMINE HYDROCHLORIDE 25 MG: 25 CAPSULE ORAL at 08:17

## 2022-12-09 RX ADMIN — ACETAMINOPHEN 650 MG: 325 TABLET, FILM COATED ORAL at 08:17

## 2022-12-09 NOTE — PROGRESS NOTES
Pt tolerated blood transfusion with no S/S of reaction or patient complaints.  DC per personal motorized wheelchair after completion of visit.

## 2022-12-22 ENCOUNTER — CLINICAL SUPPORT (OUTPATIENT)
Dept: ONCOLOGY | Facility: HOSPITAL | Age: 62
End: 2022-12-22

## 2022-12-22 ENCOUNTER — LAB (OUTPATIENT)
Dept: LAB | Facility: HOSPITAL | Age: 62
End: 2022-12-22

## 2022-12-22 DIAGNOSIS — C50.919 METASTATIC BREAST CANCER: ICD-10-CM

## 2022-12-22 LAB
BASOPHILS # BLD AUTO: 0.07 10*3/MM3 (ref 0–0.2)
BASOPHILS NFR BLD AUTO: 1.8 % (ref 0–1.5)
DEPRECATED RDW RBC AUTO: 59.4 FL (ref 37–54)
EOSINOPHIL # BLD AUTO: 0.19 10*3/MM3 (ref 0–0.4)
EOSINOPHIL NFR BLD AUTO: 5 % (ref 0.3–6.2)
ERYTHROCYTE [DISTWIDTH] IN BLOOD BY AUTOMATED COUNT: 16.9 % (ref 12.3–15.4)
HCT VFR BLD AUTO: 26.5 % (ref 34–46.6)
HGB BLD-MCNC: 8.9 G/DL (ref 12–15.9)
IMM GRANULOCYTES # BLD AUTO: 0.05 10*3/MM3 (ref 0–0.05)
IMM GRANULOCYTES NFR BLD AUTO: 1.3 % (ref 0–0.5)
LYMPHOCYTES # BLD AUTO: 0.88 10*3/MM3 (ref 0.7–3.1)
LYMPHOCYTES NFR BLD AUTO: 23 % (ref 19.6–45.3)
MCH RBC QN AUTO: 32.7 PG (ref 26.6–33)
MCHC RBC AUTO-ENTMCNC: 33.6 G/DL (ref 31.5–35.7)
MCV RBC AUTO: 97.4 FL (ref 79–97)
MONOCYTES # BLD AUTO: 0.3 10*3/MM3 (ref 0.1–0.9)
MONOCYTES NFR BLD AUTO: 7.8 % (ref 5–12)
NEUTROPHILS NFR BLD AUTO: 2.34 10*3/MM3 (ref 1.7–7)
NEUTROPHILS NFR BLD AUTO: 61.1 % (ref 42.7–76)
NRBC BLD AUTO-RTO: 0 /100 WBC (ref 0–0.2)
PLATELET # BLD AUTO: 136 10*3/MM3 (ref 140–450)
PMV BLD AUTO: 10.1 FL (ref 6–12)
RBC # BLD AUTO: 2.72 10*6/MM3 (ref 3.77–5.28)
WBC NRBC COR # BLD: 3.83 10*3/MM3 (ref 3.4–10.8)

## 2022-12-22 PROCEDURE — 85025 COMPLETE CBC W/AUTO DIFF WBC: CPT

## 2022-12-22 PROCEDURE — 36415 COLL VENOUS BLD VENIPUNCTURE: CPT

## 2022-12-22 PROCEDURE — G0463 HOSPITAL OUTPT CLINIC VISIT: HCPCS

## 2022-12-22 NOTE — NURSING NOTE
Patient is here for lab review with RN.  CBC reviewed, counts are stable for this patient at this time. Patient has no complaints. Copy of labs given to patient and follow up appointment reviewed. Patient is instructed to call the office with any concerns or new symptoms prior to next visit. Patient verbalized understanding and discharged in stable condition.      Lab Results   Component Value Date    WBC 3.83 12/22/2022    HGB 8.9 (L) 12/22/2022    HCT 26.5 (L) 12/22/2022    MCV 97.4 (H) 12/22/2022     (L) 12/22/2022

## 2022-12-24 DIAGNOSIS — E78.2 MIXED HYPERLIPIDEMIA: ICD-10-CM

## 2022-12-26 RX ORDER — ATORVASTATIN CALCIUM 40 MG/1
TABLET, FILM COATED ORAL
Qty: 30 TABLET | Refills: 0 | Status: SHIPPED | OUTPATIENT
Start: 2022-12-26 | End: 2023-03-21 | Stop reason: SDUPTHER

## 2022-12-29 ENCOUNTER — HOSPITAL ENCOUNTER (OUTPATIENT)
Dept: PET IMAGING | Facility: HOSPITAL | Age: 62
Discharge: HOME | End: 2022-12-29

## 2022-12-29 DIAGNOSIS — C50.919 METASTATIC BREAST CANCER: ICD-10-CM

## 2022-12-29 LAB — GLUCOSE BLDC GLUCOMTR-MCNC: 140 MG/DL (ref 70–130)

## 2022-12-29 PROCEDURE — 78815 PET IMAGE W/CT SKULL-THIGH: CPT

## 2022-12-29 PROCEDURE — A9552 F18 FDG: HCPCS | Performed by: INTERNAL MEDICINE

## 2022-12-29 PROCEDURE — 0 FLUDEOXYGLUCOSE F18 SOLUTION: Performed by: INTERNAL MEDICINE

## 2022-12-29 PROCEDURE — 82962 GLUCOSE BLOOD TEST: CPT

## 2022-12-29 RX ADMIN — FLUDEOXYGLUCOSE F18 1 DOSE: 300 INJECTION INTRAVENOUS at 09:43

## 2023-01-01 ENCOUNTER — SPECIALTY PHARMACY (OUTPATIENT)
Dept: PHARMACY | Facility: HOSPITAL | Age: 63
End: 2023-01-01
Payer: MEDICARE

## 2023-01-01 ENCOUNTER — DOCUMENTATION (OUTPATIENT)
Dept: PHARMACY | Facility: HOSPITAL | Age: 63
End: 2023-01-01
Payer: MEDICARE

## 2023-01-01 ENCOUNTER — TELEPHONE (OUTPATIENT)
Dept: ONCOLOGY | Facility: CLINIC | Age: 63
End: 2023-01-01

## 2023-01-01 ENCOUNTER — TELEPHONE (OUTPATIENT)
Dept: FAMILY MEDICINE CLINIC | Facility: CLINIC | Age: 63
End: 2023-01-01

## 2023-01-01 ENCOUNTER — HOSPITAL ENCOUNTER (INPATIENT)
Facility: HOSPITAL | Age: 63
LOS: 4 days | DRG: 314 | End: 2023-11-06
Attending: EMERGENCY MEDICINE | Admitting: INTERNAL MEDICINE
Payer: MEDICARE

## 2023-01-01 ENCOUNTER — APPOINTMENT (OUTPATIENT)
Dept: GENERAL RADIOLOGY | Facility: HOSPITAL | Age: 63
DRG: 314 | End: 2023-01-01
Payer: MEDICARE

## 2023-01-01 VITALS
HEIGHT: 67 IN | OXYGEN SATURATION: 94 % | DIASTOLIC BLOOD PRESSURE: 76 MMHG | BODY MASS INDEX: 45.99 KG/M2 | SYSTOLIC BLOOD PRESSURE: 92 MMHG | WEIGHT: 293 LBS | TEMPERATURE: 98.8 F

## 2023-01-01 DIAGNOSIS — N18.6 ESRD (END STAGE RENAL DISEASE): ICD-10-CM

## 2023-01-01 DIAGNOSIS — I95.9 HYPOTENSION, UNSPECIFIED HYPOTENSION TYPE: Primary | ICD-10-CM

## 2023-01-01 LAB
ABO GROUP BLD: NORMAL
ALBUMIN SERPL-MCNC: 2.1 G/DL (ref 3.5–5.2)
ALBUMIN/GLOB SERPL: 0.5 G/DL
ALP SERPL-CCNC: 122 U/L (ref 39–117)
ALT SERPL W P-5'-P-CCNC: 17 U/L (ref 1–33)
ANION GAP SERPL CALCULATED.3IONS-SCNC: 12 MMOL/L (ref 5–15)
ANION GAP SERPL CALCULATED.3IONS-SCNC: 12.3 MMOL/L (ref 5–15)
ANION GAP SERPL CALCULATED.3IONS-SCNC: 13 MMOL/L (ref 5–15)
ANION GAP SERPL CALCULATED.3IONS-SCNC: 15 MMOL/L (ref 5–15)
AST SERPL-CCNC: 24 U/L (ref 1–32)
BASOPHILS # BLD AUTO: 0.03 10*3/MM3 (ref 0–0.2)
BASOPHILS NFR BLD AUTO: 0.5 % (ref 0–1.5)
BH BB BLOOD EXPIRATION DATE: NORMAL
BH BB BLOOD TYPE BARCODE: 6200
BH BB DISPENSE STATUS: NORMAL
BH BB PRODUCT CODE: NORMAL
BH BB UNIT NUMBER: NORMAL
BILIRUB SERPL-MCNC: 0.6 MG/DL (ref 0–1.2)
BLD GP AB SCN SERPL QL: NEGATIVE
BUN SERPL-MCNC: 23 MG/DL (ref 8–23)
BUN SERPL-MCNC: 33 MG/DL (ref 8–23)
BUN SERPL-MCNC: 35 MG/DL (ref 8–23)
BUN SERPL-MCNC: 38 MG/DL (ref 8–23)
BUN/CREAT SERPL: 4.6 (ref 7–25)
BUN/CREAT SERPL: 5.1 (ref 7–25)
BUN/CREAT SERPL: 5.2 (ref 7–25)
BUN/CREAT SERPL: 5.4 (ref 7–25)
CALCIUM SPEC-SCNC: 7.8 MG/DL (ref 8.6–10.5)
CALCIUM SPEC-SCNC: 7.8 MG/DL (ref 8.6–10.5)
CALCIUM SPEC-SCNC: 7.9 MG/DL (ref 8.6–10.5)
CALCIUM SPEC-SCNC: 8.1 MG/DL (ref 8.6–10.5)
CHLORIDE SERPL-SCNC: 95 MMOL/L (ref 98–107)
CHLORIDE SERPL-SCNC: 95 MMOL/L (ref 98–107)
CHLORIDE SERPL-SCNC: 96 MMOL/L (ref 98–107)
CHLORIDE SERPL-SCNC: 99 MMOL/L (ref 98–107)
CO2 SERPL-SCNC: 22 MMOL/L (ref 22–29)
CO2 SERPL-SCNC: 23 MMOL/L (ref 22–29)
CO2 SERPL-SCNC: 25 MMOL/L (ref 22–29)
CO2 SERPL-SCNC: 25.7 MMOL/L (ref 22–29)
CORTIS SERPL-MCNC: 21.1 MCG/DL
CREAT SERPL-MCNC: 4.99 MG/DL (ref 0.57–1)
CREAT SERPL-MCNC: 6.52 MG/DL (ref 0.57–1)
CREAT SERPL-MCNC: 6.78 MG/DL (ref 0.57–1)
CREAT SERPL-MCNC: 7.05 MG/DL (ref 0.57–1)
CROSSMATCH INTERPRETATION: NORMAL
DEPRECATED RDW RBC AUTO: 55.8 FL (ref 37–54)
DEPRECATED RDW RBC AUTO: 57.6 FL (ref 37–54)
DEPRECATED RDW RBC AUTO: 57.6 FL (ref 37–54)
DEPRECATED RDW RBC AUTO: 59.4 FL (ref 37–54)
EGFRCR SERPLBLD CKD-EPI 2021: 6 ML/MIN/1.73
EGFRCR SERPLBLD CKD-EPI 2021: 6.3 ML/MIN/1.73
EGFRCR SERPLBLD CKD-EPI 2021: 6.6 ML/MIN/1.73
EGFRCR SERPLBLD CKD-EPI 2021: 9.1 ML/MIN/1.73
EOSINOPHIL # BLD AUTO: 0.07 10*3/MM3 (ref 0–0.4)
EOSINOPHIL NFR BLD AUTO: 1.1 % (ref 0.3–6.2)
ERYTHROCYTE [DISTWIDTH] IN BLOOD BY AUTOMATED COUNT: 16 % (ref 12.3–15.4)
ERYTHROCYTE [DISTWIDTH] IN BLOOD BY AUTOMATED COUNT: 16.4 % (ref 12.3–15.4)
ERYTHROCYTE [DISTWIDTH] IN BLOOD BY AUTOMATED COUNT: 17 % (ref 12.3–15.4)
ERYTHROCYTE [DISTWIDTH] IN BLOOD BY AUTOMATED COUNT: 17 % (ref 12.3–15.4)
GEN 5 2HR TROPONIN T REFLEX: 70 NG/L
GLOBULIN UR ELPH-MCNC: 4 GM/DL
GLUCOSE BLDC GLUCOMTR-MCNC: 109 MG/DL (ref 70–130)
GLUCOSE BLDC GLUCOMTR-MCNC: 115 MG/DL (ref 70–130)
GLUCOSE BLDC GLUCOMTR-MCNC: 118 MG/DL (ref 70–130)
GLUCOSE BLDC GLUCOMTR-MCNC: 52 MG/DL (ref 70–130)
GLUCOSE BLDC GLUCOMTR-MCNC: 53 MG/DL (ref 70–130)
GLUCOSE BLDC GLUCOMTR-MCNC: 80 MG/DL (ref 70–130)
GLUCOSE BLDC GLUCOMTR-MCNC: 84 MG/DL (ref 70–130)
GLUCOSE BLDC GLUCOMTR-MCNC: 84 MG/DL (ref 70–130)
GLUCOSE BLDC GLUCOMTR-MCNC: 87 MG/DL (ref 70–130)
GLUCOSE BLDC GLUCOMTR-MCNC: 96 MG/DL (ref 70–130)
GLUCOSE BLDC GLUCOMTR-MCNC: 97 MG/DL (ref 70–130)
GLUCOSE SERPL-MCNC: 112 MG/DL (ref 65–99)
GLUCOSE SERPL-MCNC: 122 MG/DL (ref 65–99)
GLUCOSE SERPL-MCNC: 156 MG/DL (ref 65–99)
GLUCOSE SERPL-MCNC: 88 MG/DL (ref 65–99)
HCT VFR BLD AUTO: 24.2 % (ref 34–46.6)
HCT VFR BLD AUTO: 26.4 % (ref 34–46.6)
HCT VFR BLD AUTO: 26.9 % (ref 34–46.6)
HCT VFR BLD AUTO: 28.1 % (ref 34–46.6)
HGB BLD-MCNC: 7.7 G/DL (ref 12–15.9)
HGB BLD-MCNC: 8.3 G/DL (ref 12–15.9)
HGB BLD-MCNC: 8.5 G/DL (ref 12–15.9)
HGB BLD-MCNC: 9.2 G/DL (ref 12–15.9)
IMM GRANULOCYTES # BLD AUTO: 0.11 10*3/MM3 (ref 0–0.05)
IMM GRANULOCYTES NFR BLD AUTO: 1.7 % (ref 0–0.5)
LYMPHOCYTES # BLD AUTO: 0.66 10*3/MM3 (ref 0.7–3.1)
LYMPHOCYTES # BLD MANUAL: 0.45 10*3/MM3 (ref 0.7–3.1)
LYMPHOCYTES NFR BLD AUTO: 9.9 % (ref 19.6–45.3)
LYMPHOCYTES NFR BLD MANUAL: 6 % (ref 5–12)
MCH RBC QN AUTO: 30.4 PG (ref 26.6–33)
MCH RBC QN AUTO: 30.6 PG (ref 26.6–33)
MCH RBC QN AUTO: 30.7 PG (ref 26.6–33)
MCH RBC QN AUTO: 30.7 PG (ref 26.6–33)
MCHC RBC AUTO-ENTMCNC: 31.4 G/DL (ref 31.5–35.7)
MCHC RBC AUTO-ENTMCNC: 31.6 G/DL (ref 31.5–35.7)
MCHC RBC AUTO-ENTMCNC: 31.8 G/DL (ref 31.5–35.7)
MCHC RBC AUTO-ENTMCNC: 32.7 G/DL (ref 31.5–35.7)
MCV RBC AUTO: 93.7 FL (ref 79–97)
MCV RBC AUTO: 96.4 FL (ref 79–97)
MCV RBC AUTO: 96.7 FL (ref 79–97)
MCV RBC AUTO: 96.8 FL (ref 79–97)
MONOCYTES # BLD AUTO: 0.3 10*3/MM3 (ref 0.1–0.9)
MONOCYTES # BLD: 0.45 10*3/MM3 (ref 0.1–0.9)
MONOCYTES NFR BLD AUTO: 4.5 % (ref 5–12)
NEUTROPHILS # BLD AUTO: 6.67 10*3/MM3 (ref 1.7–7)
NEUTROPHILS NFR BLD AUTO: 5.49 10*3/MM3 (ref 1.7–7)
NEUTROPHILS NFR BLD AUTO: 82.3 % (ref 42.7–76)
NEUTROPHILS NFR BLD MANUAL: 88 % (ref 42.7–76)
NRBC BLD AUTO-RTO: 0.3 /100 WBC (ref 0–0.2)
NRBC BLD AUTO-RTO: 0.8 /100 WBC (ref 0–0.2)
PLAT MORPH BLD: NORMAL
PLATELET # BLD AUTO: 161 10*3/MM3 (ref 140–450)
PLATELET # BLD AUTO: 174 10*3/MM3 (ref 140–450)
PLATELET # BLD AUTO: 217 10*3/MM3 (ref 140–450)
PLATELET # BLD AUTO: 235 10*3/MM3 (ref 140–450)
PMV BLD AUTO: 10.4 FL (ref 6–12)
PMV BLD AUTO: 10.5 FL (ref 6–12)
PMV BLD AUTO: 10.7 FL (ref 6–12)
PMV BLD AUTO: 11 FL (ref 6–12)
POIKILOCYTOSIS BLD QL SMEAR: ABNORMAL
POTASSIUM SERPL-SCNC: 4.8 MMOL/L (ref 3.5–5.2)
POTASSIUM SERPL-SCNC: 4.9 MMOL/L (ref 3.5–5.2)
POTASSIUM SERPL-SCNC: 5.4 MMOL/L (ref 3.5–5.2)
POTASSIUM SERPL-SCNC: 5.8 MMOL/L (ref 3.5–5.2)
PROT SERPL-MCNC: 6.1 G/DL (ref 6–8.5)
QT INTERVAL: 328 MS
QTC INTERVAL: 432 MS
RBC # BLD AUTO: 2.51 10*6/MM3 (ref 3.77–5.28)
RBC # BLD AUTO: 2.73 10*6/MM3 (ref 3.77–5.28)
RBC # BLD AUTO: 2.78 10*6/MM3 (ref 3.77–5.28)
RBC # BLD AUTO: 3 10*6/MM3 (ref 3.77–5.28)
RH BLD: POSITIVE
SODIUM SERPL-SCNC: 132 MMOL/L (ref 136–145)
SODIUM SERPL-SCNC: 132 MMOL/L (ref 136–145)
SODIUM SERPL-SCNC: 133 MMOL/L (ref 136–145)
SODIUM SERPL-SCNC: 136 MMOL/L (ref 136–145)
T&S EXPIRATION DATE: NORMAL
T4 FREE SERPL-MCNC: 0.78 NG/DL (ref 0.93–1.7)
TROPONIN T DELTA: 8 NG/L
TROPONIN T SERPL HS-MCNC: 62 NG/L
TSH SERPL DL<=0.05 MIU/L-ACNC: 6.08 UIU/ML (ref 0.27–4.2)
UNIT  ABO: NORMAL
UNIT  RH: NORMAL
VARIANT LYMPHS NFR BLD MANUAL: 6 % (ref 19.6–45.3)
WBC MORPH BLD: NORMAL
WBC NRBC COR # BLD: 6.66 10*3/MM3 (ref 3.4–10.8)
WBC NRBC COR # BLD: 6.81 10*3/MM3 (ref 3.4–10.8)
WBC NRBC COR # BLD: 6.89 10*3/MM3 (ref 3.4–10.8)
WBC NRBC COR # BLD: 7.58 10*3/MM3 (ref 3.4–10.8)

## 2023-01-01 PROCEDURE — 25810000003 SODIUM CHLORIDE 0.9 % SOLUTION: Performed by: INTERNAL MEDICINE

## 2023-01-01 PROCEDURE — 85027 COMPLETE CBC AUTOMATED: CPT | Performed by: INTERNAL MEDICINE

## 2023-01-01 PROCEDURE — 80053 COMPREHEN METABOLIC PANEL: CPT | Performed by: EMERGENCY MEDICINE

## 2023-01-01 PROCEDURE — 82533 TOTAL CORTISOL: CPT | Performed by: INTERNAL MEDICINE

## 2023-01-01 PROCEDURE — 25810000003 SODIUM CHLORIDE 0.9 % SOLUTION 250 ML FLEX CONT

## 2023-01-01 PROCEDURE — 25010000002 ONDANSETRON PER 1 MG: Performed by: INTERNAL MEDICINE

## 2023-01-01 PROCEDURE — 82948 REAGENT STRIP/BLOOD GLUCOSE: CPT

## 2023-01-01 PROCEDURE — P9016 RBC LEUKOCYTES REDUCED: HCPCS

## 2023-01-01 PROCEDURE — 63710000001 DIPHENHYDRAMINE PER 50 MG

## 2023-01-01 PROCEDURE — 86901 BLOOD TYPING SEROLOGIC RH(D): CPT | Performed by: INTERNAL MEDICINE

## 2023-01-01 PROCEDURE — 86900 BLOOD TYPING SEROLOGIC ABO: CPT

## 2023-01-01 PROCEDURE — 80048 BASIC METABOLIC PNL TOTAL CA: CPT

## 2023-01-01 PROCEDURE — 85025 COMPLETE CBC W/AUTO DIFF WBC: CPT | Performed by: EMERGENCY MEDICINE

## 2023-01-01 PROCEDURE — 99231 SBSQ HOSP IP/OBS SF/LOW 25: CPT | Performed by: INTERNAL MEDICINE

## 2023-01-01 PROCEDURE — 71045 X-RAY EXAM CHEST 1 VIEW: CPT

## 2023-01-01 PROCEDURE — 85025 COMPLETE CBC W/AUTO DIFF WBC: CPT

## 2023-01-01 PROCEDURE — 25010000002 ONDANSETRON PER 1 MG: Performed by: EMERGENCY MEDICINE

## 2023-01-01 PROCEDURE — 99285 EMERGENCY DEPT VISIT HI MDM: CPT

## 2023-01-01 PROCEDURE — 86902 BLOOD TYPE ANTIGEN DONOR EA: CPT

## 2023-01-01 PROCEDURE — 99233 SBSQ HOSP IP/OBS HIGH 50: CPT | Performed by: INTERNAL MEDICINE

## 2023-01-01 PROCEDURE — 5A1D70Z PERFORMANCE OF URINARY FILTRATION, INTERMITTENT, LESS THAN 6 HOURS PER DAY: ICD-10-PCS | Performed by: INTERNAL MEDICINE

## 2023-01-01 PROCEDURE — 99222 1ST HOSP IP/OBS MODERATE 55: CPT | Performed by: STUDENT IN AN ORGANIZED HEALTH CARE EDUCATION/TRAINING PROGRAM

## 2023-01-01 PROCEDURE — 36415 COLL VENOUS BLD VENIPUNCTURE: CPT | Performed by: INTERNAL MEDICINE

## 2023-01-01 PROCEDURE — 25810000003 SODIUM CHLORIDE 0.9 % SOLUTION: Performed by: EMERGENCY MEDICINE

## 2023-01-01 PROCEDURE — 99232 SBSQ HOSP IP/OBS MODERATE 35: CPT | Performed by: INTERNAL MEDICINE

## 2023-01-01 PROCEDURE — 86900 BLOOD TYPING SEROLOGIC ABO: CPT | Performed by: INTERNAL MEDICINE

## 2023-01-01 PROCEDURE — 5A1D70Z PERFORMANCE OF URINARY FILTRATION, INTERMITTENT, LESS THAN 6 HOURS PER DAY: ICD-10-PCS | Performed by: HOSPITALIST

## 2023-01-01 PROCEDURE — 84484 ASSAY OF TROPONIN QUANT: CPT | Performed by: EMERGENCY MEDICINE

## 2023-01-01 PROCEDURE — 80048 BASIC METABOLIC PNL TOTAL CA: CPT | Performed by: NURSE PRACTITIONER

## 2023-01-01 PROCEDURE — 25010000002 PHENYLEPHRINE 10 MG/ML SOLUTION: Performed by: INTERNAL MEDICINE

## 2023-01-01 PROCEDURE — 99223 1ST HOSP IP/OBS HIGH 75: CPT | Performed by: INTERNAL MEDICINE

## 2023-01-01 PROCEDURE — 85007 BL SMEAR W/DIFF WBC COUNT: CPT

## 2023-01-01 PROCEDURE — 93005 ELECTROCARDIOGRAM TRACING: CPT | Performed by: EMERGENCY MEDICINE

## 2023-01-01 PROCEDURE — 93010 ELECTROCARDIOGRAM REPORT: CPT | Performed by: INTERNAL MEDICINE

## 2023-01-01 PROCEDURE — 25010000002 PHENYLEPHRINE 10 MG/ML SOLUTION 5 ML VIAL

## 2023-01-01 PROCEDURE — 86920 COMPATIBILITY TEST SPIN: CPT

## 2023-01-01 PROCEDURE — 84443 ASSAY THYROID STIM HORMONE: CPT | Performed by: EMERGENCY MEDICINE

## 2023-01-01 PROCEDURE — 03HB33Z INSERTION OF INFUSION DEVICE INTO RIGHT RADIAL ARTERY, PERCUTANEOUS APPROACH: ICD-10-PCS | Performed by: HOSPITALIST

## 2023-01-01 PROCEDURE — 86922 COMPATIBILITY TEST ANTIGLOB: CPT

## 2023-01-01 PROCEDURE — 84439 ASSAY OF FREE THYROXINE: CPT | Performed by: EMERGENCY MEDICINE

## 2023-01-01 PROCEDURE — 80048 BASIC METABOLIC PNL TOTAL CA: CPT | Performed by: INTERNAL MEDICINE

## 2023-01-01 PROCEDURE — 25010000002 MORPHINE PER 10 MG: Performed by: INTERNAL MEDICINE

## 2023-01-01 PROCEDURE — 36430 TRANSFUSION BLD/BLD COMPNT: CPT

## 2023-01-01 PROCEDURE — 85027 COMPLETE CBC AUTOMATED: CPT | Performed by: NURSE PRACTITIONER

## 2023-01-01 PROCEDURE — 25010000002 LORAZEPAM PER 2 MG: Performed by: INTERNAL MEDICINE

## 2023-01-01 PROCEDURE — 86850 RBC ANTIBODY SCREEN: CPT | Performed by: INTERNAL MEDICINE

## 2023-01-01 PROCEDURE — 25010000002 HYDROMORPHONE PER 4 MG: Performed by: EMERGENCY MEDICINE

## 2023-01-01 RX ORDER — HALOPERIDOL 2 MG/1
2 TABLET ORAL EVERY 4 HOURS PRN
Status: DISCONTINUED | OUTPATIENT
Start: 2023-01-01 | End: 2023-11-07 | Stop reason: HOSPADM

## 2023-01-01 RX ORDER — DIPHENHYDRAMINE HCL 25 MG
25 CAPSULE ORAL ONCE
Status: COMPLETED | OUTPATIENT
Start: 2023-01-01 | End: 2023-01-01

## 2023-01-01 RX ORDER — LEVOTHYROXINE SODIUM 0.05 MG/1
50 TABLET ORAL
Status: DISCONTINUED | OUTPATIENT
Start: 2023-01-01 | End: 2023-01-01

## 2023-01-01 RX ORDER — DIPHENOXYLATE HYDROCHLORIDE AND ATROPINE SULFATE 2.5; .025 MG/1; MG/1
1 TABLET ORAL DAILY
Status: DISCONTINUED | OUTPATIENT
Start: 2023-01-01 | End: 2023-01-01

## 2023-01-01 RX ORDER — ACETAMINOPHEN 160 MG/5ML
650 SOLUTION ORAL EVERY 4 HOURS PRN
Status: DISCONTINUED | OUTPATIENT
Start: 2023-01-01 | End: 2023-11-07 | Stop reason: HOSPADM

## 2023-01-01 RX ORDER — ASPIRIN 81 MG/1
81 TABLET ORAL DAILY
Status: DISCONTINUED | OUTPATIENT
Start: 2023-01-01 | End: 2023-01-01

## 2023-01-01 RX ORDER — LORAZEPAM 2 MG/ML
1 INJECTION INTRAMUSCULAR
Status: DISCONTINUED | OUTPATIENT
Start: 2023-01-01 | End: 2023-11-07 | Stop reason: HOSPADM

## 2023-01-01 RX ORDER — MIDODRINE HYDROCHLORIDE 5 MG/1
10 TABLET ORAL
Status: DISCONTINUED | OUTPATIENT
Start: 2023-01-01 | End: 2023-01-01

## 2023-01-01 RX ORDER — POLYETHYLENE GLYCOL 3350 17 G/17G
17 POWDER, FOR SOLUTION ORAL DAILY PRN
Status: DISCONTINUED | OUTPATIENT
Start: 2023-01-01 | End: 2023-11-07 | Stop reason: HOSPADM

## 2023-01-01 RX ORDER — GLYCOPYRROLATE 0.2 MG/ML
0.4 INJECTION INTRAMUSCULAR; INTRAVENOUS
Status: DISCONTINUED | OUTPATIENT
Start: 2023-01-01 | End: 2023-11-07 | Stop reason: HOSPADM

## 2023-01-01 RX ORDER — SCOLOPAMINE TRANSDERMAL SYSTEM 1 MG/1
1 PATCH, EXTENDED RELEASE TRANSDERMAL
Status: DISCONTINUED | OUTPATIENT
Start: 2023-01-01 | End: 2023-11-07 | Stop reason: HOSPADM

## 2023-01-01 RX ORDER — AMOXICILLIN 250 MG
2 CAPSULE ORAL 2 TIMES DAILY
Status: DISCONTINUED | OUTPATIENT
Start: 2023-01-01 | End: 2023-11-07 | Stop reason: HOSPADM

## 2023-01-01 RX ORDER — ACETAMINOPHEN 325 MG/1
650 TABLET ORAL EVERY 4 HOURS PRN
Status: DISCONTINUED | OUTPATIENT
Start: 2023-01-01 | End: 2023-01-01

## 2023-01-01 RX ORDER — LEVOTHYROXINE SODIUM 0.15 MG/1
150 TABLET ORAL
Status: DISCONTINUED | OUTPATIENT
Start: 2023-01-01 | End: 2023-01-01

## 2023-01-01 RX ORDER — LORAZEPAM 2 MG/ML
0.5 INJECTION INTRAMUSCULAR
Status: DISCONTINUED | OUTPATIENT
Start: 2023-01-01 | End: 2023-11-07 | Stop reason: HOSPADM

## 2023-01-01 RX ORDER — GLYCOPYRROLATE 0.2 MG/ML
0.2 INJECTION INTRAMUSCULAR; INTRAVENOUS
Status: DISCONTINUED | OUTPATIENT
Start: 2023-01-01 | End: 2023-11-07 | Stop reason: HOSPADM

## 2023-01-01 RX ORDER — HYDROMORPHONE HYDROCHLORIDE 1 MG/ML
0.5 INJECTION, SOLUTION INTRAMUSCULAR; INTRAVENOUS; SUBCUTANEOUS ONCE
Status: COMPLETED | OUTPATIENT
Start: 2023-01-01 | End: 2023-01-01

## 2023-01-01 RX ORDER — HALOPERIDOL 5 MG/ML
2 INJECTION INTRAMUSCULAR EVERY 4 HOURS PRN
Status: DISCONTINUED | OUTPATIENT
Start: 2023-01-01 | End: 2023-11-07 | Stop reason: HOSPADM

## 2023-01-01 RX ORDER — ATORVASTATIN CALCIUM 20 MG/1
40 TABLET, FILM COATED ORAL NIGHTLY
Status: DISCONTINUED | OUTPATIENT
Start: 2023-01-01 | End: 2023-01-01

## 2023-01-01 RX ORDER — DIPHENHYDRAMINE HYDROCHLORIDE AND LIDOCAINE HYDROCHLORIDE AND ALUMINUM HYDROXIDE AND MAGNESIUM HYDRO
5 KIT EVERY 4 HOURS PRN
Status: DISCONTINUED | OUTPATIENT
Start: 2023-01-01 | End: 2023-11-07 | Stop reason: HOSPADM

## 2023-01-01 RX ORDER — PANTOPRAZOLE SODIUM 40 MG/1
40 TABLET, DELAYED RELEASE ORAL EVERY MORNING
Status: DISCONTINUED | OUTPATIENT
Start: 2023-01-01 | End: 2023-11-07 | Stop reason: HOSPADM

## 2023-01-01 RX ORDER — MORPHINE SULFATE 20 MG/ML
10 SOLUTION ORAL
Status: DISCONTINUED | OUTPATIENT
Start: 2023-01-01 | End: 2023-11-07 | Stop reason: HOSPADM

## 2023-01-01 RX ORDER — NICOTINE POLACRILEX 4 MG
15 LOZENGE BUCCAL
Status: DISCONTINUED | OUTPATIENT
Start: 2023-01-01 | End: 2023-01-01

## 2023-01-01 RX ORDER — DEXTROSE MONOHYDRATE 25 G/50ML
25 INJECTION, SOLUTION INTRAVENOUS
Status: DISCONTINUED | OUTPATIENT
Start: 2023-01-01 | End: 2023-01-01

## 2023-01-01 RX ORDER — HYDROMORPHONE HYDROCHLORIDE 1 MG/ML
0.5 INJECTION, SOLUTION INTRAMUSCULAR; INTRAVENOUS; SUBCUTANEOUS
Status: DISCONTINUED | OUTPATIENT
Start: 2023-01-01 | End: 2023-11-07 | Stop reason: HOSPADM

## 2023-01-01 RX ORDER — HALOPERIDOL 1 MG/1
1 TABLET ORAL EVERY 4 HOURS PRN
Status: DISCONTINUED | OUTPATIENT
Start: 2023-01-01 | End: 2023-11-07 | Stop reason: HOSPADM

## 2023-01-01 RX ORDER — CHOLECALCIFEROL (VITAMIN D3) 125 MCG
1000 CAPSULE ORAL DAILY
Status: DISCONTINUED | OUTPATIENT
Start: 2023-01-01 | End: 2023-01-01

## 2023-01-01 RX ORDER — ACETAMINOPHEN 325 MG/1
650 TABLET ORAL EVERY 4 HOURS PRN
Status: DISCONTINUED | OUTPATIENT
Start: 2023-01-01 | End: 2023-11-07 | Stop reason: HOSPADM

## 2023-01-01 RX ORDER — ONDANSETRON 2 MG/ML
4 INJECTION INTRAMUSCULAR; INTRAVENOUS EVERY 6 HOURS PRN
Status: DISCONTINUED | OUTPATIENT
Start: 2023-01-01 | End: 2023-11-07 | Stop reason: HOSPADM

## 2023-01-01 RX ORDER — PHENYLEPHRINE HCL IN 0.9% NACL 0.5 MG/5ML
.5-3 SYRINGE (ML) INTRAVENOUS
Status: DISCONTINUED | OUTPATIENT
Start: 2023-01-01 | End: 2023-01-01

## 2023-01-01 RX ORDER — KETOROLAC TROMETHAMINE 30 MG/ML
15 INJECTION, SOLUTION INTRAMUSCULAR; INTRAVENOUS EVERY 6 HOURS PRN
Status: DISCONTINUED | OUTPATIENT
Start: 2023-01-01 | End: 2023-11-07 | Stop reason: HOSPADM

## 2023-01-01 RX ORDER — SODIUM CHLORIDE 0.9 % (FLUSH) 0.9 %
10 SYRINGE (ML) INJECTION AS NEEDED
Status: DISCONTINUED | OUTPATIENT
Start: 2023-01-01 | End: 2023-11-07 | Stop reason: HOSPADM

## 2023-01-01 RX ORDER — ECHINACEA PURPUREA EXTRACT 125 MG
2 TABLET ORAL AS NEEDED
Status: DISCONTINUED | OUTPATIENT
Start: 2023-01-01 | End: 2023-11-07 | Stop reason: HOSPADM

## 2023-01-01 RX ORDER — PROCHLORPERAZINE MALEATE 10 MG
5 TABLET ORAL EVERY 6 HOURS PRN
Status: DISCONTINUED | OUTPATIENT
Start: 2023-01-01 | End: 2023-11-07 | Stop reason: HOSPADM

## 2023-01-01 RX ORDER — FOLIC ACID 1 MG/1
1 TABLET ORAL DAILY
Status: DISCONTINUED | OUTPATIENT
Start: 2023-01-01 | End: 2023-01-01

## 2023-01-01 RX ORDER — LORAZEPAM 2 MG/ML
2 CONCENTRATE ORAL
Status: DISCONTINUED | OUTPATIENT
Start: 2023-01-01 | End: 2023-11-07 | Stop reason: HOSPADM

## 2023-01-01 RX ORDER — HALOPERIDOL 2 MG/ML
1 SOLUTION ORAL EVERY 4 HOURS PRN
Status: DISCONTINUED | OUTPATIENT
Start: 2023-01-01 | End: 2023-11-07 | Stop reason: HOSPADM

## 2023-01-01 RX ORDER — UREA 10 %
3 LOTION (ML) TOPICAL NIGHTLY PRN
Status: DISCONTINUED | OUTPATIENT
Start: 2023-01-01 | End: 2023-11-07 | Stop reason: HOSPADM

## 2023-01-01 RX ORDER — LORAZEPAM 2 MG/ML
1 INJECTION INTRAMUSCULAR ONCE
Status: DISCONTINUED | OUTPATIENT
Start: 2023-01-01 | End: 2023-01-01

## 2023-01-01 RX ORDER — LORAZEPAM 2 MG/ML
0.5 CONCENTRATE ORAL
Status: DISCONTINUED | OUTPATIENT
Start: 2023-01-01 | End: 2023-11-07 | Stop reason: HOSPADM

## 2023-01-01 RX ORDER — MORPHINE SULFATE 2 MG/ML
2 INJECTION, SOLUTION INTRAMUSCULAR; INTRAVENOUS
Status: DISCONTINUED | OUTPATIENT
Start: 2023-01-01 | End: 2023-11-07 | Stop reason: HOSPADM

## 2023-01-01 RX ORDER — HALOPERIDOL 2 MG/ML
2 SOLUTION ORAL EVERY 4 HOURS PRN
Status: DISCONTINUED | OUTPATIENT
Start: 2023-01-01 | End: 2023-11-07 | Stop reason: HOSPADM

## 2023-01-01 RX ORDER — LORAZEPAM 2 MG/ML
2 INJECTION INTRAMUSCULAR
Status: DISCONTINUED | OUTPATIENT
Start: 2023-01-01 | End: 2023-11-07 | Stop reason: HOSPADM

## 2023-01-01 RX ORDER — IBUPROFEN 600 MG/1
1 TABLET ORAL
Status: DISCONTINUED | OUTPATIENT
Start: 2023-01-01 | End: 2023-01-01

## 2023-01-01 RX ORDER — PROMETHAZINE HYDROCHLORIDE 12.5 MG/1
12.5 SUPPOSITORY RECTAL EVERY 4 HOURS PRN
Status: DISCONTINUED | OUTPATIENT
Start: 2023-01-01 | End: 2023-11-07 | Stop reason: HOSPADM

## 2023-01-01 RX ORDER — MORPHINE SULFATE 2 MG/ML
4 INJECTION, SOLUTION INTRAMUSCULAR; INTRAVENOUS
Status: DISCONTINUED | OUTPATIENT
Start: 2023-01-01 | End: 2023-11-07 | Stop reason: HOSPADM

## 2023-01-01 RX ORDER — HALOPERIDOL 5 MG/ML
1 INJECTION INTRAMUSCULAR EVERY 4 HOURS PRN
Status: DISCONTINUED | OUTPATIENT
Start: 2023-01-01 | End: 2023-11-07 | Stop reason: HOSPADM

## 2023-01-01 RX ORDER — PROCHLORPERAZINE 25 MG
25 SUPPOSITORY, RECTAL RECTAL EVERY 12 HOURS PRN
Status: DISCONTINUED | OUTPATIENT
Start: 2023-01-01 | End: 2023-11-07 | Stop reason: HOSPADM

## 2023-01-01 RX ORDER — GABAPENTIN 300 MG/1
300 CAPSULE ORAL NIGHTLY
Status: DISCONTINUED | OUTPATIENT
Start: 2023-01-01 | End: 2023-11-07 | Stop reason: HOSPADM

## 2023-01-01 RX ORDER — HYDROMORPHONE HYDROCHLORIDE 2 MG/ML
1.5 INJECTION, SOLUTION INTRAMUSCULAR; INTRAVENOUS; SUBCUTANEOUS
Status: DISCONTINUED | OUTPATIENT
Start: 2023-01-01 | End: 2023-11-07 | Stop reason: HOSPADM

## 2023-01-01 RX ORDER — ONDANSETRON 4 MG/1
4 TABLET, FILM COATED ORAL EVERY 6 HOURS PRN
Status: DISCONTINUED | OUTPATIENT
Start: 2023-01-01 | End: 2023-11-07 | Stop reason: HOSPADM

## 2023-01-01 RX ORDER — DIPHENHYDRAMINE HYDROCHLORIDE 50 MG/ML
25 INJECTION INTRAMUSCULAR; INTRAVENOUS EVERY 6 HOURS PRN
Status: DISCONTINUED | OUTPATIENT
Start: 2023-01-01 | End: 2023-11-07 | Stop reason: HOSPADM

## 2023-01-01 RX ORDER — ACETAMINOPHEN 650 MG/1
650 SUPPOSITORY RECTAL EVERY 4 HOURS PRN
Status: DISCONTINUED | OUTPATIENT
Start: 2023-01-01 | End: 2023-11-07 | Stop reason: HOSPADM

## 2023-01-01 RX ORDER — MORPHINE SULFATE 10 MG/ML
6 INJECTION INTRAMUSCULAR; INTRAVENOUS; SUBCUTANEOUS
Status: DISCONTINUED | OUTPATIENT
Start: 2023-01-01 | End: 2023-11-07 | Stop reason: HOSPADM

## 2023-01-01 RX ORDER — ONDANSETRON 2 MG/ML
4 INJECTION INTRAMUSCULAR; INTRAVENOUS ONCE
Status: COMPLETED | OUTPATIENT
Start: 2023-01-01 | End: 2023-01-01

## 2023-01-01 RX ORDER — PROMETHAZINE HYDROCHLORIDE 25 MG/1
12.5 TABLET ORAL EVERY 4 HOURS PRN
Status: DISCONTINUED | OUTPATIENT
Start: 2023-01-01 | End: 2023-11-07 | Stop reason: HOSPADM

## 2023-01-01 RX ORDER — HYDROCODONE BITARTRATE AND ACETAMINOPHEN 5; 325 MG/1; MG/1
1 TABLET ORAL EVERY 4 HOURS PRN
Status: DISCONTINUED | OUTPATIENT
Start: 2023-01-01 | End: 2023-01-01

## 2023-01-01 RX ORDER — MORPHINE SULFATE 20 MG/ML
5 SOLUTION ORAL
Status: DISCONTINUED | OUTPATIENT
Start: 2023-01-01 | End: 2023-11-07 | Stop reason: HOSPADM

## 2023-01-01 RX ORDER — LORAZEPAM 2 MG/ML
1 CONCENTRATE ORAL
Status: DISCONTINUED | OUTPATIENT
Start: 2023-01-01 | End: 2023-11-07 | Stop reason: HOSPADM

## 2023-01-01 RX ORDER — PROCHLORPERAZINE EDISYLATE 5 MG/ML
5 INJECTION INTRAMUSCULAR; INTRAVENOUS EVERY 6 HOURS PRN
Status: DISCONTINUED | OUTPATIENT
Start: 2023-01-01 | End: 2023-11-07 | Stop reason: HOSPADM

## 2023-01-01 RX ORDER — DIPHENHYDRAMINE HCL 25 MG
25 CAPSULE ORAL EVERY 6 HOURS PRN
Status: DISCONTINUED | OUTPATIENT
Start: 2023-01-01 | End: 2023-11-07 | Stop reason: HOSPADM

## 2023-01-01 RX ORDER — MIDODRINE HYDROCHLORIDE 5 MG/1
10 TABLET ORAL ONCE
Status: COMPLETED | OUTPATIENT
Start: 2023-01-01 | End: 2023-01-01

## 2023-01-01 RX ORDER — BISACODYL 10 MG
10 SUPPOSITORY, RECTAL RECTAL DAILY PRN
Status: DISCONTINUED | OUTPATIENT
Start: 2023-01-01 | End: 2023-11-07 | Stop reason: HOSPADM

## 2023-01-01 RX ORDER — MORPHINE SULFATE 20 MG/ML
20 SOLUTION ORAL
Status: DISCONTINUED | OUTPATIENT
Start: 2023-01-01 | End: 2023-11-07 | Stop reason: HOSPADM

## 2023-01-01 RX ORDER — LEVOTHYROXINE SODIUM 0.1 MG/1
100 TABLET ORAL
Status: DISCONTINUED | OUTPATIENT
Start: 2023-01-01 | End: 2023-01-01

## 2023-01-01 RX ORDER — DIPHENOXYLATE HYDROCHLORIDE AND ATROPINE SULFATE 2.5; .025 MG/1; MG/1
1 TABLET ORAL
Status: DISCONTINUED | OUTPATIENT
Start: 2023-01-01 | End: 2023-11-07 | Stop reason: HOSPADM

## 2023-01-01 RX ORDER — BISACODYL 5 MG/1
5 TABLET, DELAYED RELEASE ORAL DAILY PRN
Status: DISCONTINUED | OUTPATIENT
Start: 2023-01-01 | End: 2023-11-07 | Stop reason: HOSPADM

## 2023-01-01 RX ADMIN — LORAZEPAM 0.5 MG: 2 INJECTION INTRAMUSCULAR; INTRAVENOUS at 12:39

## 2023-01-01 RX ADMIN — MIDODRINE HYDROCHLORIDE 10 MG: 5 TABLET ORAL at 08:02

## 2023-01-01 RX ADMIN — ANORECTAL OINTMENT 1 APPLICATION: 15.7; .44; 24; 20.6 OINTMENT TOPICAL at 21:32

## 2023-01-01 RX ADMIN — PHENYLEPHRINE HYDROCHLORIDE 1 MCG/KG/MIN: 50 INJECTION INTRAVENOUS at 01:43

## 2023-01-01 RX ADMIN — SCOPALAMINE 1 PATCH: 1 PATCH, EXTENDED RELEASE TRANSDERMAL at 16:48

## 2023-01-01 RX ADMIN — MORPHINE SULFATE 4 MG: 2 INJECTION, SOLUTION INTRAMUSCULAR; INTRAVENOUS at 14:32

## 2023-01-01 RX ADMIN — ONDANSETRON 4 MG: 2 INJECTION INTRAMUSCULAR; INTRAVENOUS at 22:10

## 2023-01-01 RX ADMIN — MIDODRINE HYDROCHLORIDE 10 MG: 5 TABLET ORAL at 06:38

## 2023-01-01 RX ADMIN — SERTRALINE 150 MG: 100 TABLET, FILM COATED ORAL at 08:17

## 2023-01-01 RX ADMIN — PANTOPRAZOLE SODIUM 40 MG: 40 TABLET, DELAYED RELEASE ORAL at 08:02

## 2023-01-01 RX ADMIN — ANTI-FUNGAL POWDER MICONAZOLE NITRATE TALC FREE: 1.42 POWDER TOPICAL at 03:03

## 2023-01-01 RX ADMIN — MIDODRINE HYDROCHLORIDE 10 MG: 5 TABLET ORAL at 01:38

## 2023-01-01 RX ADMIN — MIDODRINE HYDROCHLORIDE 10 MG: 5 TABLET ORAL at 17:13

## 2023-01-01 RX ADMIN — GABAPENTIN 300 MG: 300 CAPSULE ORAL at 00:11

## 2023-01-01 RX ADMIN — MIDODRINE HYDROCHLORIDE 10 MG: 5 TABLET ORAL at 06:44

## 2023-01-01 RX ADMIN — LEVOTHYROXINE SODIUM 100 MCG: 100 TABLET ORAL at 05:57

## 2023-01-01 RX ADMIN — Medication 3 MG: at 00:18

## 2023-01-01 RX ADMIN — PHENYLEPHRINE HYDROCHLORIDE 2 MCG/KG/MIN: 50 INJECTION INTRAVENOUS at 23:32

## 2023-01-01 RX ADMIN — PHENYLEPHRINE HYDROCHLORIDE 0.5 MCG/KG/MIN: 50 INJECTION INTRAVENOUS at 09:04

## 2023-01-01 RX ADMIN — MIDODRINE HYDROCHLORIDE 10 MG: 5 TABLET ORAL at 11:55

## 2023-01-01 RX ADMIN — DEXTROSE MONOHYDRATE 25 G: 25 INJECTION, SOLUTION INTRAVENOUS at 18:04

## 2023-01-01 RX ADMIN — LEVOTHYROXINE SODIUM 100 MCG: 100 TABLET ORAL at 08:02

## 2023-01-01 RX ADMIN — FOLIC ACID 1 MG: 1 TABLET ORAL at 08:02

## 2023-01-01 RX ADMIN — SODIUM CHLORIDE 500 ML: 9 INJECTION, SOLUTION INTRAVENOUS at 13:50

## 2023-01-01 RX ADMIN — SERTRALINE 150 MG: 100 TABLET, FILM COATED ORAL at 08:02

## 2023-01-01 RX ADMIN — HYDROCODONE BITARTRATE AND ACETAMINOPHEN 1 TABLET: 5; 325 TABLET ORAL at 00:20

## 2023-01-01 RX ADMIN — PANTOPRAZOLE SODIUM 40 MG: 40 TABLET, DELAYED RELEASE ORAL at 06:04

## 2023-01-01 RX ADMIN — PHENYLEPHRINE HYDROCHLORIDE 0.5 MCG/KG/MIN: 50 INJECTION INTRAVENOUS at 15:35

## 2023-01-01 RX ADMIN — PHENYLEPHRINE HYDROCHLORIDE 0.5 MCG/KG/MIN: 50 INJECTION INTRAVENOUS at 18:10

## 2023-01-01 RX ADMIN — LORAZEPAM 0.5 MG: 2 INJECTION INTRAMUSCULAR; INTRAVENOUS at 14:32

## 2023-01-01 RX ADMIN — PHENYLEPHRINE HYDROCHLORIDE 2 MCG/KG/MIN: 50 INJECTION INTRAVENOUS at 04:53

## 2023-01-01 RX ADMIN — ATORVASTATIN CALCIUM 40 MG: 20 TABLET, FILM COATED ORAL at 00:11

## 2023-01-01 RX ADMIN — HYDROMORPHONE HYDROCHLORIDE 0.5 MG: 1 INJECTION, SOLUTION INTRAMUSCULAR; INTRAVENOUS; SUBCUTANEOUS at 11:42

## 2023-01-01 RX ADMIN — ONDANSETRON 4 MG: 2 INJECTION INTRAMUSCULAR; INTRAVENOUS at 04:55

## 2023-01-01 RX ADMIN — GABAPENTIN 300 MG: 300 CAPSULE ORAL at 20:32

## 2023-01-01 RX ADMIN — FOLIC ACID 1 MG: 1 TABLET ORAL at 08:17

## 2023-01-01 RX ADMIN — ANTI-FUNGAL POWDER MICONAZOLE NITRATE TALC FREE: 1.42 POWDER TOPICAL at 15:10

## 2023-01-01 RX ADMIN — SERTRALINE 150 MG: 100 TABLET, FILM COATED ORAL at 09:09

## 2023-01-01 RX ADMIN — Medication 1 TABLET: at 08:02

## 2023-01-01 RX ADMIN — PHENYLEPHRINE HYDROCHLORIDE 0.5 MCG/KG/MIN: 50 INJECTION INTRAVENOUS at 13:27

## 2023-01-01 RX ADMIN — SODIUM ZIRCONIUM CYCLOSILICATE 10 G: 10 POWDER, FOR SUSPENSION ORAL at 19:06

## 2023-01-01 RX ADMIN — MIDODRINE HYDROCHLORIDE 10 MG: 5 TABLET ORAL at 12:27

## 2023-01-01 RX ADMIN — ONDANSETRON 4 MG: 2 INJECTION INTRAMUSCULAR; INTRAVENOUS at 15:10

## 2023-01-01 RX ADMIN — ATORVASTATIN CALCIUM 40 MG: 20 TABLET, FILM COATED ORAL at 21:32

## 2023-01-01 RX ADMIN — ONDANSETRON 4 MG: 2 INJECTION INTRAMUSCULAR; INTRAVENOUS at 09:46

## 2023-01-01 RX ADMIN — Medication 1000 MCG: at 09:09

## 2023-01-01 RX ADMIN — ATORVASTATIN CALCIUM 40 MG: 20 TABLET, FILM COATED ORAL at 20:32

## 2023-01-01 RX ADMIN — ACETAMINOPHEN 650 MG: 325 TABLET, FILM COATED ORAL at 22:55

## 2023-01-01 RX ADMIN — DEXTROSE MONOHYDRATE 25 G: 25 INJECTION, SOLUTION INTRAVENOUS at 06:09

## 2023-01-01 RX ADMIN — Medication 1000 MCG: at 08:17

## 2023-01-01 RX ADMIN — GABAPENTIN 300 MG: 300 CAPSULE ORAL at 21:32

## 2023-01-01 RX ADMIN — MIDODRINE HYDROCHLORIDE 10 MG: 5 TABLET ORAL at 18:05

## 2023-01-01 RX ADMIN — MORPHINE SULFATE 2 MG: 2 INJECTION, SOLUTION INTRAMUSCULAR; INTRAVENOUS at 11:35

## 2023-01-01 RX ADMIN — ONDANSETRON 4 MG: 2 INJECTION INTRAMUSCULAR; INTRAVENOUS at 11:51

## 2023-01-01 RX ADMIN — PHENYLEPHRINE HYDROCHLORIDE 2 MCG/KG/MIN: 50 INJECTION INTRAVENOUS at 02:16

## 2023-01-01 RX ADMIN — MIDODRINE HYDROCHLORIDE 10 MG: 5 TABLET ORAL at 19:07

## 2023-01-01 RX ADMIN — DIPHENHYDRAMINE HYDROCHLORIDE 25 MG: 25 CAPSULE ORAL at 00:20

## 2023-01-01 RX ADMIN — FOLIC ACID 1 MG: 1 TABLET ORAL at 09:09

## 2023-01-01 RX ADMIN — HYDROCODONE BITARTRATE AND ACETAMINOPHEN 1 TABLET: 5; 325 TABLET ORAL at 05:57

## 2023-01-01 RX ADMIN — SODIUM ZIRCONIUM CYCLOSILICATE 10 G: 10 POWDER, FOR SUSPENSION ORAL at 13:22

## 2023-01-01 RX ADMIN — MORPHINE SULFATE 4 MG: 2 INJECTION, SOLUTION INTRAMUSCULAR; INTRAVENOUS at 12:39

## 2023-01-01 RX ADMIN — ONDANSETRON 4 MG: 2 INJECTION INTRAMUSCULAR; INTRAVENOUS at 11:42

## 2023-01-01 RX ADMIN — MORPHINE SULFATE 2 MG: 2 INJECTION, SOLUTION INTRAMUSCULAR; INTRAVENOUS at 09:46

## 2023-01-01 RX ADMIN — Medication 1000 MCG: at 08:02

## 2023-01-01 RX ADMIN — SODIUM CHLORIDE 500 ML: 9 INJECTION, SOLUTION INTRAVENOUS at 10:37

## 2023-01-01 RX ADMIN — SODIUM CHLORIDE 500 ML: 9 INJECTION, SOLUTION INTRAVENOUS at 15:11

## 2023-01-01 RX ADMIN — MIDODRINE HYDROCHLORIDE 10 MG: 5 TABLET ORAL at 14:44

## 2023-01-01 RX ADMIN — ATORVASTATIN CALCIUM 40 MG: 20 TABLET, FILM COATED ORAL at 21:22

## 2023-01-01 RX ADMIN — GABAPENTIN 300 MG: 300 CAPSULE ORAL at 21:22

## 2023-01-03 ENCOUNTER — HOSPITAL ENCOUNTER (INPATIENT)
Facility: HOSPITAL | Age: 63
LOS: 2 days | Discharge: HOME | DRG: 689 | End: 2023-01-06
Attending: EMERGENCY MEDICINE | Admitting: STUDENT IN AN ORGANIZED HEALTH CARE EDUCATION/TRAINING PROGRAM
Payer: COMMERCIAL

## 2023-01-03 DIAGNOSIS — R53.1 GENERALIZED WEAKNESS: ICD-10-CM

## 2023-01-03 DIAGNOSIS — N18.6 END-STAGE RENAL DISEASE ON PERITONEAL DIALYSIS: ICD-10-CM

## 2023-01-03 DIAGNOSIS — Z99.2 END-STAGE RENAL DISEASE ON PERITONEAL DIALYSIS: ICD-10-CM

## 2023-01-03 DIAGNOSIS — R01.1 HEART MURMUR: ICD-10-CM

## 2023-01-03 DIAGNOSIS — J96.01 ACUTE RESPIRATORY FAILURE WITH HYPOXIA: ICD-10-CM

## 2023-01-03 DIAGNOSIS — I35.0 SEVERE AORTIC STENOSIS: ICD-10-CM

## 2023-01-03 DIAGNOSIS — D64.9 CHRONIC ANEMIA: ICD-10-CM

## 2023-01-03 DIAGNOSIS — N39.0 COMPLICATED UTI (URINARY TRACT INFECTION): Primary | ICD-10-CM

## 2023-01-03 PROCEDURE — 83735 ASSAY OF MAGNESIUM: CPT | Performed by: EMERGENCY MEDICINE

## 2023-01-03 PROCEDURE — 99285 EMERGENCY DEPT VISIT HI MDM: CPT

## 2023-01-03 PROCEDURE — P9612 CATHETERIZE FOR URINE SPEC: HCPCS

## 2023-01-03 PROCEDURE — 84145 PROCALCITONIN (PCT): CPT | Performed by: EMERGENCY MEDICINE

## 2023-01-03 PROCEDURE — 80053 COMPREHEN METABOLIC PANEL: CPT | Performed by: EMERGENCY MEDICINE

## 2023-01-03 PROCEDURE — 85025 COMPLETE CBC W/AUTO DIFF WBC: CPT | Performed by: EMERGENCY MEDICINE

## 2023-01-03 RX ORDER — SODIUM CHLORIDE 0.9 % (FLUSH) 0.9 %
10 SYRINGE (ML) INJECTION AS NEEDED
Status: DISCONTINUED | OUTPATIENT
Start: 2023-01-03 | End: 2023-01-06 | Stop reason: HOSPADM

## 2023-01-04 ENCOUNTER — APPOINTMENT (OUTPATIENT)
Dept: GENERAL RADIOLOGY | Facility: HOSPITAL | Age: 63
DRG: 689 | End: 2023-01-04
Payer: COMMERCIAL

## 2023-01-04 PROBLEM — N39.0 ACUTE UTI (URINARY TRACT INFECTION): Status: ACTIVE | Noted: 2023-01-04

## 2023-01-04 PROBLEM — N39.0 COMPLICATED UTI (URINARY TRACT INFECTION): Status: ACTIVE | Noted: 2023-01-04

## 2023-01-04 PROBLEM — F32.A DEPRESSION: Status: ACTIVE | Noted: 2023-01-04

## 2023-01-04 PROBLEM — I35.0 NONRHEUMATIC AORTIC VALVE STENOSIS: Status: RESOLVED | Noted: 2022-08-16 | Resolved: 2023-01-04

## 2023-01-04 PROBLEM — Z99.2 PERITONEAL DIALYSIS STATUS: Status: RESOLVED | Noted: 2023-01-04 | Resolved: 2023-01-04

## 2023-01-04 PROBLEM — I35.0 SEVERE AORTIC STENOSIS: Status: ACTIVE | Noted: 2023-01-04

## 2023-01-04 PROBLEM — I50.40 COMBINED SYSTOLIC AND DIASTOLIC CONGESTIVE HEART FAILURE (HCC): Status: ACTIVE | Noted: 2023-01-04

## 2023-01-04 PROBLEM — Z99.2 PERITONEAL DIALYSIS STATUS (HCC): Status: ACTIVE | Noted: 2023-01-04

## 2023-01-04 PROBLEM — G47.33 OSA (OBSTRUCTIVE SLEEP APNEA): Status: ACTIVE | Noted: 2023-01-04

## 2023-01-04 PROBLEM — E87.6 HYPOKALEMIA: Status: ACTIVE | Noted: 2023-01-04

## 2023-01-04 LAB
ABO GROUP BLD: NORMAL
ALBUMIN SERPL-MCNC: 2.7 G/DL (ref 3.5–5.2)
ALBUMIN/GLOB SERPL: 0.7 G/DL
ALP SERPL-CCNC: 80 U/L (ref 39–117)
ALT SERPL W P-5'-P-CCNC: 6 U/L (ref 1–33)
ANION GAP SERPL CALCULATED.3IONS-SCNC: 13.2 MMOL/L (ref 5–15)
ANION GAP SERPL CALCULATED.3IONS-SCNC: 8 MMOL/L (ref 5–15)
ANTI-E: NORMAL
AST SERPL-CCNC: 12 U/L (ref 1–32)
BACTERIA UR QL AUTO: ABNORMAL /HPF
BASOPHILS # BLD AUTO: 0.02 10*3/MM3 (ref 0–0.2)
BASOPHILS # BLD MANUAL: 0.04 10*3/MM3 (ref 0–0.2)
BASOPHILS NFR BLD AUTO: 0.4 % (ref 0–1.5)
BASOPHILS NFR BLD MANUAL: 1 % (ref 0–1.5)
BILIRUB SERPL-MCNC: 0.6 MG/DL (ref 0–1.2)
BILIRUB UR QL STRIP: NEGATIVE
BLD GP AB SCN SERPL QL: POSITIVE
BUN SERPL-MCNC: 25 MG/DL (ref 8–23)
BUN SERPL-MCNC: 27 MG/DL (ref 8–23)
BUN/CREAT SERPL: 5 (ref 7–25)
BUN/CREAT SERPL: 5.1 (ref 7–25)
CALCIUM SPEC-SCNC: 7.8 MG/DL (ref 8.6–10.5)
CALCIUM SPEC-SCNC: 8.3 MG/DL (ref 8.6–10.5)
CHLORIDE SERPL-SCNC: 94 MMOL/L (ref 98–107)
CHLORIDE SERPL-SCNC: 97 MMOL/L (ref 98–107)
CLARITY UR: ABNORMAL
CO2 SERPL-SCNC: 27.8 MMOL/L (ref 22–29)
CO2 SERPL-SCNC: 29 MMOL/L (ref 22–29)
COLOR UR: ABNORMAL
CREAT SERPL-MCNC: 4.98 MG/DL (ref 0.57–1)
CREAT SERPL-MCNC: 5.29 MG/DL (ref 0.57–1)
D-LACTATE SERPL-SCNC: 0.8 MMOL/L (ref 0.5–2)
D-LACTATE SERPL-SCNC: 0.9 MMOL/L (ref 0.5–2)
DAT POLY-SP REAG RBC QL: NEGATIVE
DEPRECATED RDW RBC AUTO: 55.5 FL (ref 37–54)
DEPRECATED RDW RBC AUTO: 57.7 FL (ref 37–54)
EGFRCR SERPLBLD CKD-EPI 2021: 8.5 ML/MIN/1.73
EGFRCR SERPLBLD CKD-EPI 2021: 9.2 ML/MIN/1.73
EOSINOPHIL # BLD AUTO: 0.11 10*3/MM3 (ref 0–0.4)
EOSINOPHIL # BLD MANUAL: 0.04 10*3/MM3 (ref 0–0.4)
EOSINOPHIL NFR BLD AUTO: 2.4 % (ref 0.3–6.2)
EOSINOPHIL NFR BLD MANUAL: 1 % (ref 0.3–6.2)
ERYTHROCYTE [DISTWIDTH] IN BLOOD BY AUTOMATED COUNT: 15.9 % (ref 12.3–15.4)
ERYTHROCYTE [DISTWIDTH] IN BLOOD BY AUTOMATED COUNT: 16.6 % (ref 12.3–15.4)
FLUAV SUBTYP SPEC NAA+PROBE: NOT DETECTED
FLUBV RNA ISLT QL NAA+PROBE: NOT DETECTED
GLOBULIN UR ELPH-MCNC: 4 GM/DL
GLUCOSE BLDC GLUCOMTR-MCNC: 134 MG/DL (ref 70–130)
GLUCOSE BLDC GLUCOMTR-MCNC: 178 MG/DL (ref 70–130)
GLUCOSE BLDC GLUCOMTR-MCNC: 193 MG/DL (ref 70–130)
GLUCOSE BLDC GLUCOMTR-MCNC: 220 MG/DL (ref 70–130)
GLUCOSE SERPL-MCNC: 194 MG/DL (ref 65–99)
GLUCOSE SERPL-MCNC: 201 MG/DL (ref 65–99)
GLUCOSE UR STRIP-MCNC: NEGATIVE MG/DL
HBA1C MFR BLD: 5.7 % (ref 4.8–5.6)
HCT VFR BLD AUTO: 20.7 % (ref 34–46.6)
HCT VFR BLD AUTO: 24 % (ref 34–46.6)
HGB BLD-MCNC: 7 G/DL (ref 12–15.9)
HGB BLD-MCNC: 7.8 G/DL (ref 12–15.9)
HGB UR QL STRIP.AUTO: ABNORMAL
HOLD SPECIMEN: NORMAL
HOLD SPECIMEN: NORMAL
HYALINE CASTS UR QL AUTO: ABNORMAL /LPF
IMM GRANULOCYTES # BLD AUTO: 0.04 10*3/MM3 (ref 0–0.05)
IMM GRANULOCYTES NFR BLD AUTO: 0.9 % (ref 0–0.5)
INR PPP: 1.31 (ref 0.9–1.1)
KETONES UR QL STRIP: NEGATIVE
LEUKOCYTE ESTERASE UR QL STRIP.AUTO: ABNORMAL
LYMPHOCYTES # BLD AUTO: 0.5 10*3/MM3 (ref 0.7–3.1)
LYMPHOCYTES # BLD MANUAL: 0.2 10*3/MM3 (ref 0.7–3.1)
LYMPHOCYTES NFR BLD AUTO: 10.8 % (ref 19.6–45.3)
LYMPHOCYTES NFR BLD MANUAL: 3.1 % (ref 5–12)
MAGNESIUM SERPL-MCNC: 2 MG/DL (ref 1.6–2.4)
MCH RBC QN AUTO: 31.2 PG (ref 26.6–33)
MCH RBC QN AUTO: 32.6 PG (ref 26.6–33)
MCHC RBC AUTO-ENTMCNC: 32.5 G/DL (ref 31.5–35.7)
MCHC RBC AUTO-ENTMCNC: 33.8 G/DL (ref 31.5–35.7)
MCV RBC AUTO: 96 FL (ref 79–97)
MCV RBC AUTO: 96.3 FL (ref 79–97)
MONOCYTES # BLD AUTO: 0.35 10*3/MM3 (ref 0.1–0.9)
MONOCYTES # BLD: 0.12 10*3/MM3 (ref 0.1–0.9)
MONOCYTES NFR BLD AUTO: 7.6 % (ref 5–12)
NEUTROPHILS # BLD AUTO: 3.44 10*3/MM3 (ref 1.7–7)
NEUTROPHILS NFR BLD AUTO: 3.59 10*3/MM3 (ref 1.7–7)
NEUTROPHILS NFR BLD AUTO: 77.9 % (ref 42.7–76)
NEUTROPHILS NFR BLD MANUAL: 89.7 % (ref 42.7–76)
NITRITE UR QL STRIP: POSITIVE
NRBC BLD AUTO-RTO: 0 /100 WBC (ref 0–0.2)
PH UR STRIP.AUTO: 6 [PH] (ref 5–8)
PLAT MORPH BLD: NORMAL
PLATELET # BLD AUTO: 90 10*3/MM3 (ref 140–450)
PLATELET # BLD AUTO: 98 10*3/MM3 (ref 140–450)
PMV BLD AUTO: 9.7 FL (ref 6–12)
PMV BLD AUTO: 9.8 FL (ref 6–12)
POTASSIUM SERPL-SCNC: 2.8 MMOL/L (ref 3.5–5.2)
POTASSIUM SERPL-SCNC: 3 MMOL/L (ref 3.5–5.2)
PROCALCITONIN SERPL-MCNC: 1.25 NG/ML (ref 0–0.25)
PROT SERPL-MCNC: 6.7 G/DL (ref 6–8.5)
PROT UR QL STRIP: ABNORMAL
PROTHROMBIN TIME: 16.4 SECONDS (ref 11.7–14.2)
RBC # BLD AUTO: 2.15 10*6/MM3 (ref 3.77–5.28)
RBC # BLD AUTO: 2.5 10*6/MM3 (ref 3.77–5.28)
RBC # UR STRIP: ABNORMAL /HPF
RBC MORPH BLD: NORMAL
REF LAB TEST METHOD: ABNORMAL
RH BLD: POSITIVE
SARS-COV-2 RNA PNL SPEC NAA+PROBE: NOT DETECTED
SODIUM SERPL-SCNC: 134 MMOL/L (ref 136–145)
SODIUM SERPL-SCNC: 135 MMOL/L (ref 136–145)
SP GR UR STRIP: 1.02 (ref 1–1.03)
SQUAMOUS #/AREA URNS HPF: ABNORMAL /HPF
T&S EXPIRATION DATE: NORMAL
UROBILINOGEN UR QL STRIP: ABNORMAL
VARIANT LYMPHS NFR BLD MANUAL: 5.2 % (ref 19.6–45.3)
WBC # UR STRIP: ABNORMAL /HPF
WBC CLUMPS # UR AUTO: ABNORMAL /HPF
WBC MORPH BLD: NORMAL
WBC NRBC COR # BLD: 3.84 10*3/MM3 (ref 3.4–10.8)
WBC NRBC COR # BLD: 4.61 10*3/MM3 (ref 3.4–10.8)
WHOLE BLOOD HOLD COAG: NORMAL
WHOLE BLOOD HOLD SPECIMEN: NORMAL

## 2023-01-04 PROCEDURE — 86922 COMPATIBILITY TEST ANTIGLOB: CPT

## 2023-01-04 PROCEDURE — 36430 TRANSFUSION BLD/BLD COMPNT: CPT

## 2023-01-04 PROCEDURE — 85025 COMPLETE CBC W/AUTO DIFF WBC: CPT | Performed by: NURSE PRACTITIONER

## 2023-01-04 PROCEDURE — 86900 BLOOD TYPING SEROLOGIC ABO: CPT

## 2023-01-04 PROCEDURE — 80048 BASIC METABOLIC PNL TOTAL CA: CPT | Performed by: NURSE PRACTITIONER

## 2023-01-04 PROCEDURE — 87077 CULTURE AEROBIC IDENTIFY: CPT | Performed by: EMERGENCY MEDICINE

## 2023-01-04 PROCEDURE — 25010000002 CEFTRIAXONE PER 250 MG: Performed by: NURSE PRACTITIONER

## 2023-01-04 PROCEDURE — 71045 X-RAY EXAM CHEST 1 VIEW: CPT

## 2023-01-04 PROCEDURE — 86870 RBC ANTIBODY IDENTIFICATION: CPT | Performed by: INTERNAL MEDICINE

## 2023-01-04 PROCEDURE — 99223 1ST HOSP IP/OBS HIGH 75: CPT | Performed by: INTERNAL MEDICINE

## 2023-01-04 PROCEDURE — 83605 ASSAY OF LACTIC ACID: CPT | Performed by: NURSE PRACTITIONER

## 2023-01-04 PROCEDURE — 85007 BL SMEAR W/DIFF WBC COUNT: CPT | Performed by: NURSE PRACTITIONER

## 2023-01-04 PROCEDURE — 82962 GLUCOSE BLOOD TEST: CPT

## 2023-01-04 PROCEDURE — 86900 BLOOD TYPING SEROLOGIC ABO: CPT | Performed by: INTERNAL MEDICINE

## 2023-01-04 PROCEDURE — 87086 URINE CULTURE/COLONY COUNT: CPT | Performed by: EMERGENCY MEDICINE

## 2023-01-04 PROCEDURE — 86920 COMPATIBILITY TEST SPIN: CPT

## 2023-01-04 PROCEDURE — 87186 SC STD MICRODIL/AGAR DIL: CPT | Performed by: EMERGENCY MEDICINE

## 2023-01-04 PROCEDURE — 25010000002 ONDANSETRON PER 1 MG: Performed by: NURSE PRACTITIONER

## 2023-01-04 PROCEDURE — 86901 BLOOD TYPING SEROLOGIC RH(D): CPT | Performed by: INTERNAL MEDICINE

## 2023-01-04 PROCEDURE — 87636 SARSCOV2 & INF A&B AMP PRB: CPT | Performed by: EMERGENCY MEDICINE

## 2023-01-04 PROCEDURE — 99222 1ST HOSP IP/OBS MODERATE 55: CPT | Performed by: INTERNAL MEDICINE

## 2023-01-04 PROCEDURE — 85610 PROTHROMBIN TIME: CPT | Performed by: NURSE PRACTITIONER

## 2023-01-04 PROCEDURE — 86880 COOMBS TEST DIRECT: CPT | Performed by: INTERNAL MEDICINE

## 2023-01-04 PROCEDURE — 83036 HEMOGLOBIN GLYCOSYLATED A1C: CPT | Performed by: STUDENT IN AN ORGANIZED HEALTH CARE EDUCATION/TRAINING PROGRAM

## 2023-01-04 PROCEDURE — P9016 RBC LEUKOCYTES REDUCED: HCPCS

## 2023-01-04 PROCEDURE — 87040 BLOOD CULTURE FOR BACTERIA: CPT | Performed by: EMERGENCY MEDICINE

## 2023-01-04 PROCEDURE — 86850 RBC ANTIBODY SCREEN: CPT | Performed by: INTERNAL MEDICINE

## 2023-01-04 PROCEDURE — 63710000001 INSULIN LISPRO (HUMAN) PER 5 UNITS: Performed by: NURSE PRACTITIONER

## 2023-01-04 PROCEDURE — 83605 ASSAY OF LACTIC ACID: CPT | Performed by: EMERGENCY MEDICINE

## 2023-01-04 PROCEDURE — 81001 URINALYSIS AUTO W/SCOPE: CPT | Performed by: EMERGENCY MEDICINE

## 2023-01-04 PROCEDURE — 25010000002 CEFTRIAXONE PER 250 MG: Performed by: EMERGENCY MEDICINE

## 2023-01-04 RX ORDER — NICOTINE POLACRILEX 4 MG
15 LOZENGE BUCCAL
Status: DISCONTINUED | OUTPATIENT
Start: 2023-01-04 | End: 2023-01-06 | Stop reason: HOSPADM

## 2023-01-04 RX ORDER — ARMODAFINIL 250 MG/1
250 TABLET ORAL DAILY
Status: DISCONTINUED | OUTPATIENT
Start: 2023-01-04 | End: 2023-01-06 | Stop reason: HOSPADM

## 2023-01-04 RX ORDER — SODIUM CHLORIDE 0.9 % (FLUSH) 0.9 %
10 SYRINGE (ML) INJECTION AS NEEDED
Status: DISCONTINUED | OUTPATIENT
Start: 2023-01-04 | End: 2023-01-06 | Stop reason: HOSPADM

## 2023-01-04 RX ORDER — POTASSIUM CHLORIDE 750 MG/1
20 TABLET, FILM COATED, EXTENDED RELEASE ORAL ONCE
Status: COMPLETED | OUTPATIENT
Start: 2023-01-04 | End: 2023-01-04

## 2023-01-04 RX ORDER — INSULIN LISPRO 100 [IU]/ML
0-7 INJECTION, SOLUTION INTRAVENOUS; SUBCUTANEOUS
Status: DISCONTINUED | OUTPATIENT
Start: 2023-01-04 | End: 2023-01-06 | Stop reason: HOSPADM

## 2023-01-04 RX ORDER — ASPIRIN 81 MG/1
81 TABLET ORAL DAILY
Status: DISCONTINUED | OUTPATIENT
Start: 2023-01-04 | End: 2023-01-04

## 2023-01-04 RX ORDER — SODIUM CHLORIDE 0.9 % (FLUSH) 0.9 %
10 SYRINGE (ML) INJECTION EVERY 12 HOURS SCHEDULED
Status: DISCONTINUED | OUTPATIENT
Start: 2023-01-04 | End: 2023-01-06 | Stop reason: HOSPADM

## 2023-01-04 RX ORDER — DEXTROSE MONOHYDRATE 25 G/50ML
25 INJECTION, SOLUTION INTRAVENOUS
Status: DISCONTINUED | OUTPATIENT
Start: 2023-01-04 | End: 2023-01-06 | Stop reason: HOSPADM

## 2023-01-04 RX ORDER — LEVOTHYROXINE SODIUM 0.05 MG/1
50 TABLET ORAL DAILY
Status: DISCONTINUED | OUTPATIENT
Start: 2023-01-04 | End: 2023-01-06 | Stop reason: HOSPADM

## 2023-01-04 RX ORDER — FOLIC ACID 1 MG/1
1000 TABLET ORAL DAILY
Status: DISCONTINUED | OUTPATIENT
Start: 2023-01-04 | End: 2023-01-06 | Stop reason: HOSPADM

## 2023-01-04 RX ORDER — ACETAMINOPHEN 650 MG/1
650 SUPPOSITORY RECTAL EVERY 4 HOURS PRN
Status: DISCONTINUED | OUTPATIENT
Start: 2023-01-04 | End: 2023-01-06 | Stop reason: HOSPADM

## 2023-01-04 RX ORDER — NITROGLYCERIN 0.4 MG/1
0.4 TABLET SUBLINGUAL
Status: DISCONTINUED | OUTPATIENT
Start: 2023-01-04 | End: 2023-01-06 | Stop reason: HOSPADM

## 2023-01-04 RX ORDER — ACETAMINOPHEN 160 MG/5ML
650 SOLUTION ORAL EVERY 4 HOURS PRN
Status: DISCONTINUED | OUTPATIENT
Start: 2023-01-04 | End: 2023-01-06 | Stop reason: HOSPADM

## 2023-01-04 RX ORDER — CHOLECALCIFEROL (VITAMIN D3) 125 MCG
1000 CAPSULE ORAL DAILY
Status: DISCONTINUED | OUTPATIENT
Start: 2023-01-04 | End: 2023-01-06 | Stop reason: HOSPADM

## 2023-01-04 RX ORDER — ATORVASTATIN CALCIUM 20 MG/1
40 TABLET, FILM COATED ORAL NIGHTLY
Status: DISCONTINUED | OUTPATIENT
Start: 2023-01-04 | End: 2023-01-06 | Stop reason: HOSPADM

## 2023-01-04 RX ORDER — GABAPENTIN 300 MG/1
300 CAPSULE ORAL NIGHTLY
Status: DISCONTINUED | OUTPATIENT
Start: 2023-01-04 | End: 2023-01-06 | Stop reason: HOSPADM

## 2023-01-04 RX ORDER — ONDANSETRON 2 MG/ML
4 INJECTION INTRAMUSCULAR; INTRAVENOUS EVERY 6 HOURS PRN
Status: DISCONTINUED | OUTPATIENT
Start: 2023-01-04 | End: 2023-01-06 | Stop reason: HOSPADM

## 2023-01-04 RX ORDER — ACETAMINOPHEN 325 MG/1
650 TABLET ORAL EVERY 4 HOURS PRN
Status: DISCONTINUED | OUTPATIENT
Start: 2023-01-04 | End: 2023-01-06 | Stop reason: HOSPADM

## 2023-01-04 RX ORDER — SODIUM CHLORIDE 9 MG/ML
40 INJECTION, SOLUTION INTRAVENOUS AS NEEDED
Status: DISCONTINUED | OUTPATIENT
Start: 2023-01-04 | End: 2023-01-06 | Stop reason: HOSPADM

## 2023-01-04 RX ADMIN — FOLIC ACID 1000 MCG: 1 TABLET ORAL at 08:58

## 2023-01-04 RX ADMIN — Medication 10 ML: at 08:59

## 2023-01-04 RX ADMIN — INSULIN LISPRO 2 UNITS: 100 INJECTION, SOLUTION INTRAVENOUS; SUBCUTANEOUS at 08:59

## 2023-01-04 RX ADMIN — CEFTRIAXONE SODIUM 1 G: 1 INJECTION, POWDER, FOR SOLUTION INTRAMUSCULAR; INTRAVENOUS at 02:17

## 2023-01-04 RX ADMIN — Medication 10 ML: at 20:46

## 2023-01-04 RX ADMIN — SERTRALINE HYDROCHLORIDE 150 MG: 50 TABLET, FILM COATED ORAL at 09:00

## 2023-01-04 RX ADMIN — GABAPENTIN 300 MG: 300 CAPSULE ORAL at 20:45

## 2023-01-04 RX ADMIN — INSULIN LISPRO 2 UNITS: 100 INJECTION, SOLUTION INTRAVENOUS; SUBCUTANEOUS at 17:35

## 2023-01-04 RX ADMIN — LEVOTHYROXINE SODIUM 50 MCG: 0.05 TABLET ORAL at 08:58

## 2023-01-04 RX ADMIN — POTASSIUM CHLORIDE 20 MEQ: 750 TABLET, EXTENDED RELEASE ORAL at 17:35

## 2023-01-04 RX ADMIN — CEFTRIAXONE SODIUM 1 G: 1 INJECTION, POWDER, FOR SOLUTION INTRAMUSCULAR; INTRAVENOUS at 21:57

## 2023-01-04 RX ADMIN — ATORVASTATIN CALCIUM 40 MG: 20 TABLET, FILM COATED ORAL at 20:45

## 2023-01-04 RX ADMIN — ASPIRIN 81 MG: 81 TABLET, COATED ORAL at 08:58

## 2023-01-04 RX ADMIN — ONDANSETRON 4 MG: 2 INJECTION INTRAMUSCULAR; INTRAVENOUS at 10:33

## 2023-01-04 RX ADMIN — INSULIN LISPRO 3 UNITS: 100 INJECTION, SOLUTION INTRAVENOUS; SUBCUTANEOUS at 12:46

## 2023-01-04 RX ADMIN — ARMODAFINIL 250 MG: 250 TABLET ORAL at 13:52

## 2023-01-04 RX ADMIN — INSULIN GLARGINE-YFGN 25 UNITS: 100 INJECTION, SOLUTION SUBCUTANEOUS at 08:58

## 2023-01-04 RX ADMIN — Medication 1000 MCG: at 09:00

## 2023-01-04 NOTE — DISCHARGE PLACEMENT REQUEST
"Azael Hall (62 y.o. Female)     Date of Birth   1960    Social Security Number       Address   [address] [place] [address]    Home Phone   [phone]    MRN   [id number]       [employer]   Patient Refused    Marital Status                               Admission Date   1/3/23    Admission Type   Emergency    Admitting Provider   Lorena Harden MD    Attending Provider   Lorena Harden MD    Department, Room/Bed   [facility] OBSERVATION, 126/1       Discharge Date       Discharge Disposition       Discharge Destination                               Attending Provider: Lorena Harden MD    Allergies: No Known Allergies    Isolation: None   Infection: None   Code Status: CPR    Ht: 170.2 cm (67\")   Wt: 167 kg (368 lb 11.2 oz)    Admission Cmt: None   Principal Problem: Complicated UTI (urinary tract infection) [N39.0]                 Active Insurance as of 1/3/2023     Primary Coverage     Payor Plan Insurance Group Employer/Plan Group    [employer] EMPLOYEE [id number]     Payor Plan Address Payor Plan Phone Number Payor Plan Fax Number Effective Dates    [address] [phone]  1/1/2022 - None Entered    [facility] [id number]       Subscriber Name Subscriber Birth Date Member ID       AZAEL HALL 1960 [id number]                 Emergency Contacts      (Rel.) Home Phone Work Phone Mobile Phone    IsabelRk (Spouse) [phone] -- [phone]              "

## 2023-01-04 NOTE — CONSULTS
[facility]                                                                                             Nephrology Initial Consult Note    Patient Identification:  Name: Juana Hall MRN: [id number]  Age: 62 y.o. : 1960  Sex: female  Date:2023    Requesting Physician: As per consult order.  Reason for Consultation: ESRD, dialysis management  Information from:patient/ family/ chart      History of Present Illness: This is a 62 y.o. year old female with end-stage renal disease.  She is actually on home hemodialysis.  Multiple notes have documented that she is on peritoneal dialysis but she performs home hemodialysis 5 days/week through a left upper extremity AV fistula.  Her previous CVC has been removed.  She has metastatic breast cancer and is on palliative chemotherapy.  She has chronic anemia and requires frequent blood transfusions despite high doses of BEAU's administered with outpatient hemodialysis.  She presented to the ER last night complaining of increasing weakness over the last 1 week.  I saw her last week in the home hemodialysis office and she was not complaining of any weakness or dysuria at that time.  She was found to have a UTI, hemoglobin of 7 and a potassium of 2.8.  She denies any fevers or chills, no shortness of breath chest pain or edema.  Her last dialysis was last night and she reports no problems with her treatments.    The following medical history and medications personally reviewed by me:    Problem List:   Patient Active Problem List    Diagnosis    • *Complicated UTI (urinary tract infection) [N39.0]    • Bicuspid aortic valve [Q23.1]    • Chronic anemia [D64.9]    • Rectal bleeding [K62.5]    • Generalized weakness [R53.1]    • COVID-19 virus detected [U07.1]    • Hypocalcemia [E83.51]    • Morbid obesity with BMI of 50.0-59.9, adult (HCC) [E66.01, Z68.43]    •  ESRD (end stage renal disease) (HCC) [N18.6]    • Pancytopenia due to chemotherapy (HCC) [D61.810]    • Hydronephrosis [N13.30]    • Severe malnutrition (HCC) [E43]    • RYAN (acute kidney injury) (HCC) [N17.9]    • Anemia, chronic disease [D63.8]    • Diastolic CHF, chronic (HCC) [I50.32]    • Metabolic acidosis [E87.20]    • Frequent UTI [N39.0]    • Anxiety and depression [F41.9, F32.A]    • Stage 3b chronic kidney disease (HCC) [N18.32]    • Anemia in stage 3 chronic kidney disease (HCC) [N18.30, D63.1]    • Iron deficiency anemia [D50.9]    • Elevated serum creatinine [R79.89]    • Metastatic breast cancer (HCC) [C50.919]    • Normocytic anemia [D64.9]    • History of right breast cancer [Z85.3]    • Omental mass [K66.8]    • Peripheral neuropathy [G62.9]    • Acquired hypothyroidism [E03.9]    • Anxiety [F41.9]    • Depression [F32.A]    • Diabetes type 2, controlled (HCC) [E11.9]    • Hyperlipidemia [E78.5]    • Heart murmur [R01.1]    • Hypertension [I10]    • Post-traumatic osteoarthritis of multiple joints [M15.3]    • Psoriasis [L40.9]    • Radiculopathy [M54.10]        Past Medical History:  Past Medical History:   Diagnosis Date   • Anemia    • Anxiety and depression    • Bicuspid aortic valve    • Breast cancer (HCC) 2004    RIGHT, HAD NEELA. MASTECTOMY, CHEMO AND RADIATION   • CHF (congestive heart failure) (HCC)    • CKD (chronic kidney disease)    • Diabetes mellitus (HCC)    • Dialysis patient (HCC)     TUES AND SATURDAY REBEKA GUIDO   • Elevated cholesterol    • Frequent UTI    • Heart murmur    • History of kidney stones    • History of MRSA infection 2005    POST BREAST SURGERY-TREATED [facility]   • History of sepsis 2010    KIDNEY STONE   • History of transfusion    • Hyperlipidemia    • Hypertension    • Hyperthyroidism    • Hypothyroidism    • Kidney stone     CURRENT LEFT-DEC 2021   • Lymphedema of leg     LEFT   • Metastasis from breast cancer (HCC)     STOMACH-OMENTUM   • Neuromuscular disorder (HCC)     • Obesity    • ANDREW on CPAP     CPAP   • Osteoarthrosis    • Peripheral neuropathy     FEET BILAT   • Radiculopathy    • Rectal bleeding 2022   • Thickened endometrium    • Weakness     BILAT LEGS-WITH ANY AMT OF TIME       Past Surgical History:  Past Surgical History:   Procedure Laterality Date   • ARTERIOVENOUS FISTULA/SHUNT SURGERY Left 2021    Procedure: LEFT  BRACHIOCEPALIC ARTERIOVENOUS FISTULA;  Surgeon: Dejuan Adler MD;  Location: [facility] OR;  Service: Vascular;  Laterality: Left;   • BREAST RECONSTRUCTION      WITH IMPLANTS, AND REVISION, NOW REMOVED   • BREAST SURGERY     • CARDIAC CATHETERIZATION N/A 2022    Procedure: CORONARY ANGIOGRAPHY;  Surgeon: Luis Rivers MD;  [facility];  Service: Cardiology;  Laterality: N/A;   • CARDIAC CATHETERIZATION N/A 2022    Procedure: Right Heart Cath;  Surgeon: Luis Rivers MD;  [facility];  Service: Cardiology;  Laterality: N/A;   • CATARACT EXTRACTION WITH INTRAOCULAR LENS IMPLANT Bilateral    •  SECTION  1987   •  SECTION  1987   • CYSTOSCOPY W/ URETERAL STENT PLACEMENT     • CYSTOSCOPY W/ URETERAL STENT PLACEMENT Left 2021    Procedure: CYSTOSCOPY KEFT RETROGRADE PYELOGRAM  LEFT  URETERAL STENT PLACEMENT;  Surgeon: Leodan Mckee Jr., MD;  Location: [facility] OR;  Service: Urology;  Laterality: Left;   • CYSTOSCOPY W/ URETERAL STENT PLACEMENT Left 03/10/2022    Procedure: CYSTOSCOPY AND LEFT URETERAL STENT EXCHANGE, RETROGRADE PYELOGRAM;  Surgeon: Leodan Mckee Jr., MD;  Location: [facility] OR;  Service: Urology;  Laterality: Left;   • D & C HYSTEROSCOPY N/A 2017    Procedure: DILATATION AND CURETTAGE, HYSTEROSCOPY ;  Surgeon: Cherie Oliveros MD;  Location: [facility];  Service:    • D & C HYSTEROSCOPY N/A 2019    Procedure: DILATATION AND CURETTAGE  HYSTEROSCOPY/POLYPECTOMY;  Surgeon: Cherie Oliveros MD;  Location: [facility] OR [facility];  Service: Gynecology   • INSERTION AND REMOVAL HEMODIALYSIS CATHETER Right 05/01/2021   • INSERTION HEMODIALYSIS CATHETER Right 05/01/2021    Procedure: HEMODIALYSIS CATHETER INSERTION;  Surgeon: Clint Hernández MD;  Location: [facility] MAIN OR;  Service: Vascular;  Laterality: Right;   • LYMPH NODE BIOPSY     • MASTECTOMY Bilateral 2004        Home Meds:   Medications Prior to Admission   Medication Sig Dispense Refill Last Dose   • abemaciclib (VERZENIO) 150 mg tablet chemo tablet Take 1 tablet by mouth 2 (Two) Times a Day. 60 tablet 5 1/3/2023 at 2200   • acetaminophen (TYLENOL) 500 MG tablet Take 500 mg by mouth As Needed.      • Armodafinil 250 MG tablet Take 1 tablet by mouth Daily. 30 tablet 2 1/3/2023 at 0900   • aspirin 81 MG EC tablet Take 81 mg by mouth Daily.   1/3/2023 at 2200   • atorvastatin (LIPITOR) 40 MG tablet TAKE ONE TABLET BY MOUTH ONCE NIGHTLY 30 tablet 0 1/3/2023 at 2200   • carvedilol (COREG) 3.125 MG tablet TAKE ONE TABLET BY MOUTH TWICE A  tablet 1 1/3/2023 at 0900   • folic acid (FOLVITE) 1 MG tablet TAKE ONE TABLET BY MOUTH DAILY 30 tablet 2 1/3/2023 at 0900   • gabapentin (Neurontin) 300 MG capsule Take 1 capsule by mouth Every Night. 60 capsule 2 1/2/2023   • Levemir FlexTouch 100 UNIT/ML injection Inject 50 Units under the skin into the appropriate area as directed Daily. 30 mL 0 Past Week   • levothyroxine (SYNTHROID, LEVOTHROID) 50 MCG tablet Take 1 tablet by mouth Daily. 90 tablet 1 1/3/2023 at 0900   • mupirocin (BACTROBAN) 2 % ointment       • ondansetron (Zofran) 8 MG tablet Take 1 tablet by mouth Every 8 (Eight) Hours As Needed for Nausea or Vomiting. 30 tablet 2    • sertraline (ZOLOFT) 100 MG tablet Take 1.5 tablets by mouth Daily. 135 tablet 0 1/3/2023 at 2200   • vitamin B-12 (CYANOCOBALAMIN) 1000 MCG tablet Take 1,000 mcg by mouth Daily.   1/3/2023 at 0900   • NON FORMULARY Take 5 mg by  mouth 2 (Two) Times a Day. Versinio      • NON FORMULARY Inject 1 dose under the skin into the appropriate area as directed Every 30 (Thirty) Days. facidex          Current Meds:   Current Facility-Administered Medications   Medication Dose Route Frequency Provider Last Rate Last Admin   • acetaminophen (TYLENOL) tablet 650 mg  650 mg Oral Q4H PRN Annabelle Keller APRN        Or   • acetaminophen (TYLENOL) 160 MG/5ML solution 650 mg  650 mg Oral Q4H PRN Annabelle Keller APRN        Or   • acetaminophen (TYLENOL) suppository 650 mg  650 mg Rectal Q4H PRN Annabelle Keller APRN       • Armodafinil 250 mg  250 mg Oral Daily Lorena Harden MD   250 mg at 01/04/23 1352   • atorvastatin (LIPITOR) tablet 40 mg  40 mg Oral Nightly Lorena Harden MD       • cefTRIAXone (ROCEPHIN) 1 g in sodium chloride 0.9 % 100 mL IVPB-VTB  1 g Intravenous Q24H Annabelle Keller APRN       • dextrose (D50W) (25 g/50 mL) IV injection 25 g  25 g Intravenous Q15 Min PRN Annabelle Keller APRN       • dextrose (GLUTOSE) oral gel 15 g  15 g Oral Q15 Min PRN Annabelle Keller APRN       • folic acid (FOLVITE) tablet 1,000 mcg  1,000 mcg Oral Daily Lorena Harden MD   1,000 mcg at 01/04/23 0858   • gabapentin (NEURONTIN) capsule 300 mg  300 mg Oral Nightly Lorena Haredn MD       • glucagon (human recombinant) (GLUCAGEN DIAGNOSTIC) injection 1 mg  1 mg Intramuscular Q15 Min PRN Annabelle Keller APRN       • insulin glargine (LANTUS, SEMGLEE) injection 25 Units  25 Units Subcutaneous QAM Lorena Harden MD   25 Units at 01/04/23 0858   • insulin lispro (ADMELOG) injection 0-7 Units  0-7 Units Subcutaneous TID AC Annabelle Keller APRN   3 Units at 01/04/23 1246   • levothyroxine (SYNTHROID, LEVOTHROID) tablet 50 mcg  50 mcg Oral Daily Lorena Harden MD   50 mcg at 01/04/23 0858   • nitroglycerin (NITROSTAT) SL tablet 0.4 mg  0.4 mg Sublingual Q5 Min PRN Annabelle Keller, APRN       •  ondansetron (ZOFRAN) injection 4 mg  4 mg Intravenous Q6H PRN Annabelle Keller APRN   4 mg at 23 1033   • sertraline (ZOLOFT) tablet 150 mg  150 mg Oral Daily Lorena Harden MD   150 mg at 23 0900   • sodium chloride 0.9 % flush 10 mL  10 mL Intravenous PRN Shine Sanchez MD       • sodium chloride 0.9 % flush 10 mL  10 mL Intravenous Q12H Annabelle Keller APRN   10 mL at 23 0859   • sodium chloride 0.9 % flush 10 mL  10 mL Intravenous PRN Annabelle Kellre APRN       • sodium chloride 0.9 % infusion 40 mL  40 mL Intravenous PRN Annabelle Keller APRN       • vitamin B-12 (CYANOCOBALAMIN) tablet 1,000 mcg  1,000 mcg Oral Daily Lorena Harden MD   1,000 mcg at 23 0900       Allergies:  No Known Allergies    Social History:   Social History     Socioeconomic History   • Marital status:      Spouse name: Rk   Tobacco Use   • Smoking status: Never   • Smokeless tobacco: Never   Vaping Use   • Vaping Use: Never used   Substance and Sexual Activity   • Alcohol use: No   • Drug use: No   • Sexual activity: Yes     Partners: Male     Birth control/protection: Post-menopausal        Family History:  Family History   Problem Relation Age of Onset   • Heart attack Mother 72   • Coronary artery disease Mother         Mother  from MI at 72   • Diabetes Mother    • Breast cancer Mother    • Hyperlipidemia Mother    • Arthritis Mother    • Cancer Mother    • Heart attack Father 74   • Aortic aneurysm Father         Father with ruptured thoracic aortic aneurysm   • Coronary artery disease Father         Father  from MI at 74   • Heart disease Father    • Hyperlipidemia Father    • Arthritis Father    • Heart attack Maternal Grandmother 76   • Coronary artery disease Maternal Grandmother         MGM deceeased from MI at age 76   • Malig Hyperthermia Neg Hx         Review of Systems: as per HPI, in addition:    General:      Complains of weakness /  "fatigue,                       No fevers / chills                       no weight loss  HEENT:       no dysphagia / odynophagia  Neck:           normal range of motion, no swelling  Respiratory: no cough / congestion                      No shortness of air                       No wheezing  CV:              No chest pain                       No palpitations  Abdomen/GI: no nausea / vomiting                      No diarrhea / constipation                      No abdominal pain  :             no dysuria / urinary frequency                       No urgency, normal output  Endocrine:   no polyuria / polydipsia,                      No heat or cold intolerance  Skin:           no rashes or skin breakdown   Vascular:   No edema                     No claudication  Psych:        no depression/ anxiety  Neuro:        no focal weakness, no seizures  Musculoskeletal: no joint pain or deformities      Physical Exam:  Vitals:   Temp (24hrs), Av.8 °F (37.1 °C), Min:98.1 °F (36.7 °C), Max:100.2 °F (37.9 °C)    /51   Pulse 55   Temp 98.3 °F (36.8 °C) (Oral)   Resp 18   Ht 170.2 cm (67\")   Wt (!) 167 kg (368 lb 11.2 oz)   LMP  (LMP Unknown)   SpO2 100%   BMI 57.75 kg/m²   Intake/Output:     Intake/Output Summary (Last 24 hours) at 2023 1437  Last data filed at 2023 0300  Gross per 24 hour   Intake 100 ml   Output 40 ml   Net 60 ml        Wt Readings from Last 1 Encounters:   23 0838 (!) 167 kg (368 lb 11.2 oz)   23 2245 (!) 165 kg (363 lb 12.1 oz)       Exam:    General Appearance:  Awake, alert, oriented x3, no acute distress  Obese, chronically ill-appearing   Head and Face:  Normocephalic, atraumatic, mucus membranes moist, oropharynx clear   Eyes:  No icterus, pupils equal round and reactive to light, extraocular movements intact    ENMT: Moist mucosa, tongue symmetric    Neck: Supple  no jugular venous distention  no thyromegaly   Pulmonary:  Respiratory effort: Normal  Auscultation of " lungs: Clear bilaterally  No wheezes  No rhonchi  Good air movement, good expansion   Chest wall:  No tenderness or deformity   Cardiovascular:  Auscultation of the heart: Normal rhythm, no murmurs  Trace ankle edema of bilateral lower extremities   Abdomen:  Abdomen: soft, non-tender, normal bowel sounds all four quadrants, no masses   Liver and spleen: no hepatosplenomegaly   Musculoskeletal: Digits and nails: normal  Normal range of motion  No joint swelling or gross deformities    Skin: Skin inspection: color normal, no visible rashes or lesions  Skin palpation: texture, turgor normal, no palpable lesions   Lymphatic:  no cervical lymphadenopathy    Psychiatric: Judgement and insight: normal  Orientation to person place and time: normal  Mood and affect: normal       DATA:  Radiology and Labs:  The following labs independently reviewed by me, additional AM labs ordered  Old records independently reviewed showing metastatic breast cancer, ESRD on home HD  The following radiologic studies independently viewed by me, findings chest x-ray with cardiomegaly mild vascular congestion  Interval notes, chart personally reviewed by me.  I have reviewed and summarized old records as detailed above  Plan of care discussed with patient and her  at bedside  New problems include UTI with weakness    Dialysis patient access: Left upper extremity AV fistula    Risk/ complexity of medical care/ medical decision making: Moderate complexity      Labs:   Recent Results (from the past 24 hour(s))   Green Top (Gel)    Collection Time: 01/03/23 11:17 PM   Result Value Ref Range    Extra Tube Hold for add-ons.    Lavender Top    Collection Time: 01/03/23 11:17 PM   Result Value Ref Range    Extra Tube hold for add-on    Gold Top - SST    Collection Time: 01/03/23 11:17 PM   Result Value Ref Range    Extra Tube Hold for add-ons.    Light Blue Top    Collection Time: 01/03/23 11:17 PM   Result Value Ref Range    Extra Tube Hold for  add-ons.    Comprehensive Metabolic Panel    Collection Time: 01/03/23 11:17 PM    Specimen: Blood   Result Value Ref Range    Glucose 201 (H) 65 - 99 mg/dL    BUN 25 (H) 8 - 23 mg/dL    Creatinine 4.98 (H) 0.57 - 1.00 mg/dL    Sodium 134 (L) 136 - 145 mmol/L    Potassium 3.0 (L) 3.5 - 5.2 mmol/L    Chloride 97 (L) 98 - 107 mmol/L    CO2 29.0 22.0 - 29.0 mmol/L    Calcium 8.3 (L) 8.6 - 10.5 mg/dL    Total Protein 6.7 6.0 - 8.5 g/dL    Albumin 2.7 (L) 3.5 - 5.2 g/dL    ALT (SGPT) 6 1 - 33 U/L    AST (SGOT) 12 1 - 32 U/L    Alkaline Phosphatase 80 39 - 117 U/L    Total Bilirubin 0.6 0.0 - 1.2 mg/dL    Globulin 4.0 gm/dL    A/G Ratio 0.7 g/dL    BUN/Creatinine Ratio 5.0 (L) 7.0 - 25.0    Anion Gap 8.0 5.0 - 15.0 mmol/L    eGFR 9.2 (L) >60.0 mL/min/1.73   Procalcitonin    Collection Time: 01/03/23 11:17 PM    Specimen: Blood   Result Value Ref Range    Procalcitonin 1.25 (H) 0.00 - 0.25 ng/mL   CBC Auto Differential    Collection Time: 01/03/23 11:17 PM    Specimen: Blood   Result Value Ref Range    WBC 4.61 3.40 - 10.80 10*3/mm3    RBC 2.50 (L) 3.77 - 5.28 10*6/mm3    Hemoglobin 7.8 (L) 12.0 - 15.9 g/dL    Hematocrit 24.0 (L) 34.0 - 46.6 %    MCV 96.0 79.0 - 97.0 fL    MCH 31.2 26.6 - 33.0 pg    MCHC 32.5 31.5 - 35.7 g/dL    RDW 16.6 (H) 12.3 - 15.4 %    RDW-SD 57.7 (H) 37.0 - 54.0 fl    MPV 9.7 6.0 - 12.0 fL    Platelets 98 (L) 140 - 450 10*3/mm3    Neutrophil % 77.9 (H) 42.7 - 76.0 %    Lymphocyte % 10.8 (L) 19.6 - 45.3 %    Monocyte % 7.6 5.0 - 12.0 %    Eosinophil % 2.4 0.3 - 6.2 %    Basophil % 0.4 0.0 - 1.5 %    Immature Grans % 0.9 (H) 0.0 - 0.5 %    Neutrophils, Absolute 3.59 1.70 - 7.00 10*3/mm3    Lymphocytes, Absolute 0.50 (L) 0.70 - 3.10 10*3/mm3    Monocytes, Absolute 0.35 0.10 - 0.90 10*3/mm3    Eosinophils, Absolute 0.11 0.00 - 0.40 10*3/mm3    Basophils, Absolute 0.02 0.00 - 0.20 10*3/mm3    Immature Grans, Absolute 0.04 0.00 - 0.05 10*3/mm3    nRBC 0.0 0.0 - 0.2 /100 WBC   Magnesium    Collection Time:  01/03/23 11:17 PM    Specimen: Blood   Result Value Ref Range    Magnesium 2.0 1.6 - 2.4 mg/dL   COVID-19 and FLU A/B PCR - Swab, Nasopharynx    Collection Time: 01/04/23 12:50 AM    Specimen: Nasopharynx; Swab   Result Value Ref Range    COVID19 Not Detected Not Detected - Ref. Range    Influenza A PCR Not Detected Not Detected    Influenza B PCR Not Detected Not Detected   Urinalysis With Culture If Indicated - Straight Cath    Collection Time: 01/04/23 12:51 AM    Specimen: Straight Cath; Urine   Result Value Ref Range    Color, UA Red (A) Yellow, Straw    Appearance, UA Turbid (A) Clear    pH, UA 6.0 5.0 - 8.0    Specific Gravity, UA 1.019 1.005 - 1.030    Glucose, UA Negative Negative    Ketones, UA Negative Negative    Bilirubin, UA Negative Negative    Blood, UA Large (3+) (A) Negative    Protein, UA >=300 mg/dL (3+) (A) Negative    Leuk Esterase, UA Large (3+) (A) Negative    Nitrite, UA Positive (A) Negative    Urobilinogen, UA 0.2 E.U./dL 0.2 - 1.0 E.U./dL   Urinalysis, Microscopic Only - Straight Cath    Collection Time: 01/04/23 12:51 AM    Specimen: Straight Cath; Urine   Result Value Ref Range    RBC, UA 13-20 (A) None Seen, 0-2 /HPF    WBC, UA Too Numerous to Count (A) None Seen, 0-2 /HPF    Bacteria, UA 4+ (A) None Seen /HPF    Squamous Epithelial Cells, UA None Seen None Seen, 0-2 /HPF    Hyaline Casts, UA None Seen None Seen /LPF    WBC Clumps, UA Large/3+ None Seen /HPF    Methodology Manual Light Microscopy    Lactic Acid, Plasma    Collection Time: 01/04/23 12:59 AM    Specimen: Blood   Result Value Ref Range    Lactate 0.9 0.5 - 2.0 mmol/L   Basic Metabolic Panel    Collection Time: 01/04/23  6:50 AM    Specimen: Blood   Result Value Ref Range    Glucose 194 (H) 65 - 99 mg/dL    BUN 27 (H) 8 - 23 mg/dL    Creatinine 5.29 (H) 0.57 - 1.00 mg/dL    Sodium 135 (L) 136 - 145 mmol/L    Potassium 2.8 (L) 3.5 - 5.2 mmol/L    Chloride 94 (L) 98 - 107 mmol/L    CO2 27.8 22.0 - 29.0 mmol/L    Calcium 7.8  (L) 8.6 - 10.5 mg/dL    BUN/Creatinine Ratio 5.1 (L) 7.0 - 25.0    Anion Gap 13.2 5.0 - 15.0 mmol/L    eGFR 8.5 (L) >60.0 mL/min/1.73   CBC Auto Differential    Collection Time: 01/04/23  6:50 AM    Specimen: Blood   Result Value Ref Range    WBC 3.84 3.40 - 10.80 10*3/mm3    RBC 2.15 (L) 3.77 - 5.28 10*6/mm3    Hemoglobin 7.0 (L) 12.0 - 15.9 g/dL    Hematocrit 20.7 (C) 34.0 - 46.6 %    MCV 96.3 79.0 - 97.0 fL    MCH 32.6 26.6 - 33.0 pg    MCHC 33.8 31.5 - 35.7 g/dL    RDW 15.9 (H) 12.3 - 15.4 %    RDW-SD 55.5 (H) 37.0 - 54.0 fl    MPV 9.8 6.0 - 12.0 fL    Platelets 90 (L) 140 - 450 10*3/mm3   Protime-INR    Collection Time: 01/04/23  6:50 AM    Specimen: Blood   Result Value Ref Range    Protime 16.4 (H) 11.7 - 14.2 Seconds    INR 1.31 (H) 0.90 - 1.10   Lactic Acid, Plasma    Collection Time: 01/04/23  6:50 AM    Specimen: Blood   Result Value Ref Range    Lactate 0.8 0.5 - 2.0 mmol/L   Manual Differential    Collection Time: 01/04/23  6:50 AM    Specimen: Blood   Result Value Ref Range    Neutrophil % 89.7 (H) 42.7 - 76.0 %    Lymphocyte % 5.2 (L) 19.6 - 45.3 %    Monocyte % 3.1 (L) 5.0 - 12.0 %    Eosinophil % 1.0 0.3 - 6.2 %    Basophil % 1.0 0.0 - 1.5 %    Neutrophils Absolute 3.44 1.70 - 7.00 10*3/mm3    Lymphocytes Absolute 0.20 (L) 0.70 - 3.10 10*3/mm3    Monocytes Absolute 0.12 0.10 - 0.90 10*3/mm3    Eosinophils Absolute 0.04 0.00 - 0.40 10*3/mm3    Basophils Absolute 0.04 0.00 - 0.20 10*3/mm3    RBC Morphology Normal Normal    WBC Morphology Normal Normal    Platelet Morphology Normal Normal   POC Glucose Once    Collection Time: 01/04/23  7:54 AM    Specimen: Blood   Result Value Ref Range    Glucose 193 (H) 70 - 130 mg/dL   POC Glucose Once    Collection Time: 01/04/23 11:14 AM    Specimen: Blood   Result Value Ref Range    Glucose 220 (H) 70 - 130 mg/dL   Type & Screen    Collection Time: 01/04/23 12:10 PM    Specimen: Blood   Result Value Ref Range    ABO Type A     RH type Positive     Antibody  Screen Positive     T&S Expiration Date 1/7/2023 11:59:59 PM    Antibody Identification    Collection Time: 01/04/23 12:10 PM    Specimen: Blood   Result Value Ref Range    Anti-E ANTI-E    Direct Antiglobulin Test (Direct Rose)    Collection Time: 01/04/23 12:10 PM    Specimen: Blood   Result Value Ref Range    LAURITA Negative    Prepare RBC, 2 Units    Collection Time: 01/04/23  2:19 PM   Result Value Ref Range    Product Code K8829K98     Unit Number [id number]     UNIT  ABO A     UNIT  RH POS     Crossmatch Interpretation Compatible     Dispense Status XM     Blood Expiration Date 202301262359     Blood Type Barcode 6200     Product Code Y2856L86     Unit Number [id number]     UNIT  ABO A     UNIT  RH POS     Crossmatch Interpretation Compatible     Dispense Status IS     Blood Expiration Date 202301232359     Blood Type Barcode 6200        Radiology:  Imaging Results (Last 24 Hours)     Procedure Component Value Units Date/Time    XR Chest 1 View [[id number]] Collected: 01/04/23 0235     Updated: 01/04/23 0235    Narrative:        Patient: AZAEL ALEXANDER  Time Out: 02:34  Exam(s): FILM CXR 1 VIEW     EXAM:    XR Chest, 1 View    CLINICAL HISTORY:     Reason for exam: Fever, shortness of breath.    TECHNIQUE:    Frontal view of the chest.    COMPARISON:  January 20 at 2022.    FINDINGS:    Lungs: Patchy opacities are present at the left lung base, probably   related to atelectasis, but an early infiltrate is not excluded.    Pleural space:  Unremarkable.  No pneumothorax.    Heart: Cardiomegaly is present, with mild pulmonary vascular congestion.    The possibility of early congestive heart failure is not excluded.    Mediastinum:  Unremarkable.    Bones joints: Degenerative disc disease present.    IMPRESSION:       Cardiomegaly is present with mild pulmonary vascular congestion.    Opacities present at the left lung base, probably related to atelectasis,   but an early infiltrate is not excluded.     Impression:          Electronically signed by Chito Loco D.O. on 01-04-23 at 0234               ASSESSMENT:   End-stage renal disease on home hemodialysis 5 days/week.  She had a full treatment yesterday.  We will keep her on a Tuesday Thursday Saturday schedule this admission    Complicated UTI (urinary tract infection), on IV antibiotics    Diabetes type 2, controlled (HCC)    Hyperlipidemia    Hypertension    Normocytic anemia, chronic secondary to chemotherapy and renal disease.  On high-dose BEAU, Mircera with outpatient dialysis, transfusion planned    Metastatic breast cancer, palliative chemotherapy per oncology    Morbid obesity with BMI of 50.0-59.9, adult (HCC)    ESRD (end stage renal disease) ([facility])  Hypocalcemia, corrects to normal due to low albumin      DISCUSSION/PLAN:   No need for dialysis today  Please note that the patient is on home hemodialysis, not peritoneal dialysis as previously documented in the chart this admission  Will give potassium replacement orally x1 today  IV antibiotics for UTI, follow-up on culture results  Agree with transfusion  Did have some vascular congestion on chest x-ray but UF challenge may be difficult given her chronic hypotension.  Goal UF 2 L with HD tomorrow  Resume high-dose Epogen with HD  Follow-up a.m. labs including phosphorus, magnesium and ionized calcium      Continue to monitor electrolytes and volume closely, avoid IV contrast and nephrotoxic medications     I appreciate the consult request, please call if any questions      Tai Antonio M.D  [facility]  Office phone number: [phone]  Answering service phone number: [phone]      1/4/2023        Dictation via Dragon dictation software

## 2023-01-04 NOTE — ED NOTES
Pt arrived to the ED via EMS from home. Pt reports she became weaker & weaker over the past week. States today should could not stand up. Performs peritoneal dialysis at home. Reports blood in urine, Vomiting. Pt reports she receives transfusions approx every 6 weeks due to chronic anaemia.

## 2023-01-04 NOTE — PLAN OF CARE
Problem: Adult Inpatient Plan of Care  Goal: Plan of Care Review  Outcome: Ongoing, Progressing  Flowsheets (Taken 1/4/2023 1757)  Progress: improving  Plan of Care Reviewed With: patient  Goal: Patient-Specific Goal (Individualized)  Outcome: Ongoing, Progressing  Goal: Absence of Hospital-Acquired Illness or Injury  Outcome: Ongoing, Progressing  Intervention: Identify and Manage Fall Risk  Recent Flowsheet Documentation  Taken 1/4/2023 1756 by Monse Toney RN  Safety Promotion/Fall Prevention:   activity supervised   clutter free environment maintained   assistive device/personal items within reach   fall prevention program maintained   nonskid shoes/slippers when out of bed   room organization consistent   safety round/check completed  Taken 1/4/2023 1616 by Monse Toney RN  Safety Promotion/Fall Prevention:   clutter free environment maintained   activity supervised   assistive device/personal items within reach   fall prevention program maintained   nonskid shoes/slippers when out of bed   room organization consistent   safety round/check completed  Taken 1/4/2023 1354 by Monse Toney RN  Safety Promotion/Fall Prevention:   clutter free environment maintained   assistive device/personal items within reach   activity supervised   nonskid shoes/slippers when out of bed   room organization consistent   safety round/check completed   fall prevention program maintained  Taken 1/4/2023 1200 by Monse Toney RN  Safety Promotion/Fall Prevention:   clutter free environment maintained   assistive device/personal items within reach   activity supervised   fall prevention program maintained   nonskid shoes/slippers when out of bed   room organization consistent   safety round/check completed  Taken 1/4/2023 0951 by Monse Toney RN  Safety Promotion/Fall Prevention:   activity supervised   assistive device/personal items within reach   clutter free environment maintained   fall  prevention program maintained   nonskid shoes/slippers when out of bed   room organization consistent   safety round/check completed  Taken 1/4/2023 0845 by Monse Toney RN  Safety Promotion/Fall Prevention:   clutter free environment maintained   assistive device/personal items within reach   activity supervised   fall prevention program maintained   nonskid shoes/slippers when out of bed   room organization consistent   safety round/check completed  Intervention: Prevent Skin Injury  Recent Flowsheet Documentation  Taken 1/4/2023 1756 by Monse Toney RN  Body Position: supine  Taken 1/4/2023 1616 by Monse Toney RN  Body Position: supine  Taken 1/4/2023 1354 by Monse Toney RN  Body Position: supine  Taken 1/4/2023 1200 by Monse Toney RN  Body Position:   left   side-lying   position changed independently  Taken 1/4/2023 0951 by Monse Toney RN  Body Position: supine  Taken 1/4/2023 0845 by Monse Toney RN  Body Position: supine  Intervention: Prevent and Manage VTE (Venous Thromboembolism) Risk  Recent Flowsheet Documentation  Taken 1/4/2023 1756 by Monse Toney RN  Activity Management: activity adjusted per tolerance  Taken 1/4/2023 1616 by Monse Toney RN  Activity Management: activity adjusted per tolerance  Taken 1/4/2023 1354 by Monse Toney RN  Activity Management: activity adjusted per tolerance  Taken 1/4/2023 1200 by Monse Toney RN  Activity Management: activity adjusted per tolerance  Taken 1/4/2023 0951 by Monse Toney RN  Activity Management: activity adjusted per tolerance  Taken 1/4/2023 0845 by Monse Toney RN  Activity Management: activity adjusted per tolerance  Goal: Optimal Comfort and Wellbeing  Outcome: Ongoing, Progressing  Goal: Readiness for Transition of Care  Outcome: Ongoing, Progressing  Intervention: Mutually Develop Transition Plan  Recent Flowsheet Documentation  Taken 1/4/2023 0800 by  Monse Toney RN  Equipment Currently Used at Home:   glucometer   bath bench   cpap   rollator  Transportation Anticipated: family or friend will provide  Transportation Concerns: none  Patient/Family Anticipated Services at Transition: none  Patient/Family Anticipates Transition to: home with family     Problem: Fall Injury Risk  Goal: Absence of Fall and Fall-Related Injury  Outcome: Ongoing, Progressing  Intervention: Promote Injury-Free Environment  Recent Flowsheet Documentation  Taken 1/4/2023 1756 by Monse Toney RN  Safety Promotion/Fall Prevention:   activity supervised   clutter free environment maintained   assistive device/personal items within reach   fall prevention program maintained   nonskid shoes/slippers when out of bed   room organization consistent   safety round/check completed  Taken 1/4/2023 1616 by Monse Toney RN  Safety Promotion/Fall Prevention:   clutter free environment maintained   activity supervised   assistive device/personal items within reach   fall prevention program maintained   nonskid shoes/slippers when out of bed   room organization consistent   safety round/check completed  Taken 1/4/2023 1354 by Monse Toney RN  Safety Promotion/Fall Prevention:   clutter free environment maintained   assistive device/personal items within reach   activity supervised   nonskid shoes/slippers when out of bed   room organization consistent   safety round/check completed   fall prevention program maintained  Taken 1/4/2023 1200 by Monse Toney RN  Safety Promotion/Fall Prevention:   clutter free environment maintained   assistive device/personal items within reach   activity supervised   fall prevention program maintained   nonskid shoes/slippers when out of bed   room organization consistent   safety round/check completed  Taken 1/4/2023 0951 by Monse Toney RN  Safety Promotion/Fall Prevention:   activity supervised   assistive device/personal items  within reach   clutter free environment maintained   fall prevention program maintained   nonskid shoes/slippers when out of bed   room organization consistent   safety round/check completed  Taken 1/4/2023 0845 by Monse Toney RN  Safety Promotion/Fall Prevention:   clutter free environment maintained   assistive device/personal items within reach   activity supervised   fall prevention program maintained   nonskid shoes/slippers when out of bed   room organization consistent   safety round/check completed   Goal Outcome Evaluation:  Plan of Care Reviewed With: patient        Progress: improving

## 2023-01-04 NOTE — ED NOTES
Nursing report ED to floor  Juana Hall  62 y.o.  female    HPI :   Chief Complaint   Patient presents with    Weakness - Generalized    Blood in Urine    Vomiting       Admitting doctor:   Lorena Harden MD    Admitting diagnosis:   The primary encounter diagnosis was Complicated UTI (urinary tract infection). Diagnoses of Generalized weakness, End-stage renal disease on peritoneal dialysis (HCC), Heart murmur, Chronic anemia, and Acute respiratory failure with hypoxia (HCC) were also pertinent to this visit.    Code status:   Current Code Status       Date Active Code Status Order ID Comments User Context       1/4/2023 0300 CPR (Attempt to Resuscitate) [id number]  Annabelle Keller, APRN ED        Question Answer    Code Status (Patient has no pulse and is not breathing) CPR (Attempt to Resuscitate)    Medical Interventions (Patient has pulse or is breathing) Full Support                    Allergies:   Patient has no known allergies.    Isolation:   Enhanced Droplet/Contact     Intake and Output    Intake/Output Summary (Last 24 hours) at 1/4/2023 0714  Last data filed at 1/4/2023 0300  Gross per 24 hour   Intake 100 ml   Output 40 ml   Net 60 ml       Weight:       01/03/23  2245   Weight: (!) 165 kg (363 lb 12.1 oz)       Most recent vitals:   Vitals:    01/04/23 0131 01/04/23 0328 01/04/23 0401 01/04/23 0549   BP: 115/49 (!) 93/39 100/43 (!) 98/38   Pulse: 63 60 59 59   Resp:  17     Temp:       TempSrc:       SpO2: 99% 98% 99% 92%   Weight:       Height:           Active LDAs/IV Access:   Lines, Drains & Airways       Active LDAs       Name Placement date Placement time Site Days    Peripheral IV 01/03/23 2316 Right Antecubital 01/03/23 2316  Antecubital  less than 1    Hemodialysis Cath Double --  PRESENT UPON ADMISSION  --  Subclavian  --                    Labs (abnormal labs have a star):   Labs Reviewed   COMPREHENSIVE METABOLIC PANEL - Abnormal; Notable for the following components:        "Result Value    Glucose 201 (*)     BUN 25 (*)     Creatinine 4.98 (*)     Sodium 134 (*)     Potassium 3.0 (*)     Chloride 97 (*)     Calcium 8.3 (*)     Albumin 2.7 (*)     BUN/Creatinine Ratio 5.0 (*)     eGFR 9.2 (*)     All other components within normal limits    Narrative:     GFR Normal >60  Chronic Kidney Disease <60  Kidney Failure <15     URINALYSIS W/ CULTURE IF INDICATED - Abnormal; Notable for the following components:    Color, UA Red (*)     Appearance, UA Turbid (*)     Blood, UA Large (3+) (*)     Protein, UA >=300 mg/dL (3+) (*)     Leuk Esterase, UA Large (3+) (*)     Nitrite, UA Positive (*)     All other components within normal limits    Narrative:     In absence of clinical symptoms, the presence of pyuria, bacteria, and/or nitrites on the urinalysis result does not correlate with infection.   PROCALCITONIN - Abnormal; Notable for the following components:    Procalcitonin 1.25 (*)     All other components within normal limits    Narrative:     As a Marker for Sepsis (Non-Neonates):    1. <0.5 ng/mL represents a low risk of severe sepsis and/or septic shock.  2. >2 ng/mL represents a high risk of severe sepsis and/or septic shock.    As a Marker for Lower Respiratory Tract Infections that require antibiotic therapy:    PCT on Admission    Antibiotic Therapy       6-12 Hrs later    >0.5                Strongly Recommended  >0.25 - <0.5        Recommended   0.1 - 0.25          Discouraged              Remeasure/reassess PCT  <0.1                Strongly Discouraged     Remeasure/reassess PCT    As 28 day mortality risk marker: \"Change in Procalcitonin Result\" (>80% or <=80%) if Day 0 (or Day 1) and Day 4 values are available. Refer to http://www.[employer]-pct-calculator.com    Change in PCT <=80%  A decrease of PCT levels below or equal to 80% defines a positive change in PCT test result representing a higher risk for 28-day all-cause mortality of patients diagnosed with severe sepsis for septic " shock.    Change in PCT >80%  A decrease of PCT levels of more than 80% defines a negative change in PCT result representing a lower risk for 28-day all-cause mortality of patients diagnosed with severe sepsis or septic shock.      CBC WITH AUTO DIFFERENTIAL - Abnormal; Notable for the following components:    RBC 2.50 (*)     Hemoglobin 7.8 (*)     Hematocrit 24.0 (*)     RDW 16.6 (*)     RDW-SD 57.7 (*)     Platelets 98 (*)     Neutrophil % 77.9 (*)     Lymphocyte % 10.8 (*)     Immature Grans % 0.9 (*)     Lymphocytes, Absolute 0.50 (*)     All other components within normal limits   URINALYSIS, MICROSCOPIC ONLY - Abnormal; Notable for the following components:    RBC, UA 13-20 (*)     WBC, UA Too Numerous to Count (*)     Bacteria, UA 4+ (*)     All other components within normal limits   COVID-19 AND FLU A/B, NP SWAB IN TRANSPORT MEDIA 8-12 HR TAT - Normal    Narrative:     Fact sheet for providers: https://www.fda.gov/media/935329/download    Fact sheet for patients: https://www.fda.gov/media/888568/download    Test performed by PCR.   LACTIC ACID, PLASMA - Normal   MAGNESIUM - Normal   BLOOD CULTURE   BLOOD CULTURE   URINE CULTURE   RAINBOW DRAW    Narrative:     The following orders were created for panel order [facility] Draw.  Procedure                               Abnormality         Status                     ---------                               -----------         ------                     Green Top (Gel)[[id number]]                                  Final result               Lavender Top[[id number]]                                     Final result               Gold Top - SST[[id number]]                                   Final result               Light Blue Top[[id number]]                                   Final result                 Please view results for these tests on the individual orders.   BASIC METABOLIC PANEL   CBC WITH AUTO DIFFERENTIAL   PROTIME-INR   LACTIC ACID, PLASMA   POCT GLUCOSE  FINGERSTICK   POCT GLUCOSE FINGERSTICK   POCT GLUCOSE FINGERSTICK   POCT GLUCOSE FINGERSTICK   GREEN TOP   LAVENDER TOP   GOLD TOP - SST   LIGHT BLUE TOP   CBC AND DIFFERENTIAL    Narrative:     The following orders were created for panel order CBC & Differential.  Procedure                               Abnormality         Status                     ---------                               -----------         ------                     CBC Auto Differential[[id number]]        Abnormal            Final result                 Please view results for these tests on the individual orders.       EKG:   No orders to display       Meds given in ED:   Medications   sodium chloride 0.9 % flush 10 mL (has no administration in time range)   sodium chloride 0.9 % flush 10 mL (has no administration in time range)   sodium chloride 0.9 % flush 10 mL (has no administration in time range)   sodium chloride 0.9 % infusion 40 mL (has no administration in time range)   dextrose (GLUTOSE) oral gel 15 g (has no administration in time range)   dextrose (D50W) (25 g/50 mL) IV injection 25 g (has no administration in time range)   glucagon (human recombinant) (GLUCAGEN DIAGNOSTIC) injection 1 mg (has no administration in time range)   nitroglycerin (NITROSTAT) SL tablet 0.4 mg (has no administration in time range)   acetaminophen (TYLENOL) tablet 650 mg (has no administration in time range)     Or   acetaminophen (TYLENOL) 160 MG/5ML solution 650 mg (has no administration in time range)     Or   acetaminophen (TYLENOL) suppository 650 mg (has no administration in time range)   insulin lispro (ADMELOG) injection 0-7 Units (has no administration in time range)   ondansetron (ZOFRAN) injection 4 mg (has no administration in time range)   cefTRIAXone (ROCEPHIN) 1 g in sodium chloride 0.9 % 100 mL IVPB-VTB (has no administration in time range)   cefTRIAXone (ROCEPHIN) 1 g in sodium chloride 0.9 % 100 mL IVPB-VTB (0 g Intravenous Stopped 1/4/23  0247)       Imaging results:  XR Chest 1 View    Result Date: 1/4/2023  Electronically signed by Chito Loco D.O. on 01-04-23 at 0234     Ambulatory status:   - bed rest     Social issues:   Social History     Socioeconomic History    Marital status:      Spouse name: Rk   Tobacco Use    Smoking status: Never    Smokeless tobacco: Never   Vaping Use    Vaping Use: Never used   Substance and Sexual Activity    Alcohol use: No    Drug use: No    Sexual activity: Yes     Partners: Male     Birth control/protection: Post-menopausal       NIH Stroke Scale:         Geoffrey Rodriguez RN  01/04/23 07:14 EST

## 2023-01-04 NOTE — PAYOR COMM NOTE
"Azael Hall (62 y.o. Female)       ATTN: REQUESTING INPATIENT AUTHORIZATION: [id number]    PLEASE REPLY TO UR DEPT: [phone],  [phone]    [facility]: NPI [id number]       Date of Birth   1960    Social Security Number       Address   [address] [place], [place] [address]    Home Phone   [phone]    MRN   [id number]       Anglican   Patient Refused    Marital Status                               Admission Date   1/3/23    Admission Type   Emergency    Admitting Provider   Lorena Harden MD    Attending Provider   Lorena Harden MD    Department, Room/Bed   [facility] OBSERVATION, 126/1       Discharge Date       Discharge Disposition       Discharge Destination                               Attending Provider: Lorena Harden MD    Allergies: No Known Allergies    Isolation: None   Infection: None   Code Status: CPR    Ht: 170.2 cm (67\")   Wt: 167 kg (368 lb 11.2 oz)    Admission Cmt: None   Principal Problem: Acute UTI (urinary tract infection) [N39.0]                 Active Insurance as of 1/3/2023     Primary Coverage     Payor Plan Insurance Group Employer/Plan Group    [facility] [employer] EM[employer] [id number]     Payor Plan Address Payor Plan Phone Number Payor Plan Fax Number Effective Dates    [address] [phone]  1/1/2022 - None Entered    [place] [address]       Subscriber Name Subscriber Birth Date Member ID       AZAEL HALL 1960 [id number]                 Emergency Contacts      (Rel.) Home Phone Work Phone Mobile Phone    Rk Hall (Spouse) [phone] -- [phone]               History & Physical      Lorena Harden MD at 01/04/23 0800              Patient Name:  Azael Hall  YOB: 1960  MRN:  [id number]  Admit Date:  1/3/2023  Patient Care Team:  Kiana Gramajo APRN as PCP - General (Family Medicine)  Pasha Harkins, " "MD as Referring Physician (Cardiology)  Leodan Irvin Jr., MD as Consulting Physician (Hematology and Oncology)  Tai Antonio MD as Consulting Physician (Nephrology)      Subjective    History Present Illness     Chief Complaint   Patient presents with   • Weakness - Generalized   • Blood in Urine   • Vomiting       Ms. Hall is a 62 y.o. non-smoker with a history of end-stage renal disease on hemodialysis, anemia of chronic disease/underlying malignancy, type 2 diabetes, combined systolic/diastolic CHF, metastatic breast cancer that presents to [facility] complaining of generalized malaise, dysuria and hematuria.    History of Present Illness  62-year-old pleasant female who reports an approximate 6-day history generalized weakness/malaise.  She reports she started to feel bad on Friday, on Monday morning she felt she could \"barely move\" and after hemodialysis on Monday she physically was unable to stand up by herself.  On Sunday she was instructed to perform a 24-hour urine collection for nephrology and that is when she and her  noticed that she was having blood in her urine.  The patient reports that she is not sure how long she has been having blood in her urine as she \"does not look\" at her urine and thinks it could have been for a while.  She reports dysuria/a painful pressure-like feeling starting before Friday but is unable to clearly tell me when.  She also reports new abdominal pain, shortness of breath on exertion and intermittent nausea with vomiting starting this weekend.    Review of Systems   Constitutional: Positive for activity change and fatigue. Negative for fever.   Respiratory: Positive for shortness of breath. Negative for chest tightness and wheezing.    Cardiovascular: Negative for chest pain.   Gastrointestinal: Positive for abdominal pain, nausea and vomiting.   Genitourinary: Positive for dysuria, frequency and hematuria.   Musculoskeletal: Positive for " myalgias.   Neurological: Positive for weakness and numbness (chronic neuropathy). Negative for syncope.   Psychiatric/Behavioral: Negative for confusion.        Personal History     Past Medical History:   Diagnosis Date   • Anemia    • Anxiety and depression    • Bicuspid aortic valve    • Breast cancer (HCC) 2004    RIGHT, HAD NEELA. MASTECTOMY, CHEMO AND RADIATION   • CHF (congestive heart failure) (HCC)    • CKD (chronic kidney disease)    • Diabetes mellitus (HCC)    • Dialysis patient (HCC)     TUES AND SATURDAY DAVITA HAY   • Elevated cholesterol    • Frequent UTI    • Heart murmur    • History of kidney stones    • History of MRSA infection 2005    POST BREAST SURGERY-TREATED [facility]   • History of sepsis 2010    KIDNEY STONE   • History of transfusion    • Hyperlipidemia    • Hypertension    • Hyperthyroidism    • Hypothyroidism    • Kidney stone     CURRENT LEFT-DEC 2021   • Lymphedema of leg     LEFT   • Metastasis from breast cancer (HCC)     STOMACH-OMENTUM   • Neuromuscular disorder (HCC)    • Obesity    • ANDREW on CPAP     CPAP   • Osteoarthrosis    • Peripheral neuropathy     FEET BILAT   • Radiculopathy    • Rectal bleeding 8/16/2022   • Thickened endometrium    • Weakness     BILAT LEGS-WITH ANY AMT OF TIME     Past Surgical History:   Procedure Laterality Date   • ARTERIOVENOUS FISTULA/SHUNT SURGERY Left 11/03/2021    Procedure: LEFT  BRACHIOCEPALIC ARTERIOVENOUS FISTULA;  Surgeon: Dejuan Adler MD;  Location: [facility] OR;  Service: Vascular;  Laterality: Left;   • BREAST RECONSTRUCTION  2004    WITH IMPLANTS, AND REVISION, NOW REMOVED   • BREAST SURGERY  2004   • CARDIAC CATHETERIZATION N/A 8/23/2022    Procedure: CORONARY ANGIOGRAPHY;  Surgeon: Luis Rivers MD;  Location: [facility] CATH INVASIVE LOCATION;  Service: Cardiology;  Laterality: N/A;   • CARDIAC CATHETERIZATION N/A 8/23/2022    Procedure: Right Heart Cath;  Surgeon: Luis Rivers MD;  Location: [facility] CATH  INVASIVE LOCATION;  Service: Cardiology;  Laterality: N/A;   • CATARACT EXTRACTION WITH INTRAOCULAR LENS IMPLANT Bilateral    •  SECTION  1987   •  SECTION  1987   • CYSTOSCOPY W/ URETERAL STENT PLACEMENT     • CYSTOSCOPY W/ URETERAL STENT PLACEMENT Left 2021    Procedure: CYSTOSCOPY KEFT RETROGRADE PYELOGRAM  LEFT  URETERAL STENT PLACEMENT;  Surgeon: Leodan Mckee Jr., MD;  Location: [facility] MAIN OR;  Service: Urology;  Laterality: Left;   • CYSTOSCOPY W/ URETERAL STENT PLACEMENT Left 03/10/2022    Procedure: CYSTOSCOPY AND LEFT URETERAL STENT EXCHANGE, RETROGRADE PYELOGRAM;  Surgeon: Leodan Mckee Jr., MD;  Location: [facility] MAIN OR;  Service: Urology;  Laterality: Left;   • D & C HYSTEROSCOPY N/A 2017    Procedure: DILATATION AND CURETTAGE, HYSTEROSCOPY ;  Surgeon: Cherie Oliveros MD;  [place] [facility] [place] [facility];  Service:    • D & C HYSTEROSCOPY N/A 2019    Procedure: DILATATION AND CURETTAGE HYSTEROSCOPY/POLYPECTOMY;  Surgeon: Cherie Oliveros MD;  [facility];  Service: Gynecology   • INSERTION AND REMOVAL HEMODIALYSIS CATHETER Right 2021   • INSERTION HEMODIALYSIS CATHETER Right 2021    Procedure: HEMODIALYSIS CATHETER INSERTION;  Surgeon: Clint Hernández MD;  Location: [facility] MAIN OR;  Service: Vascular;  Laterality: Right;   • LYMPH NODE BIOPSY     • MASTECTOMY Bilateral      Family History   Problem Relation Age of Onset   • Heart attack Mother 72   • Coronary artery disease Mother         Mother  from MI at 72   • Diabetes Mother    • Breast cancer Mother    • Hyperlipidemia Mother    • Arthritis Mother    • Cancer Mother    • Heart attack Father 74   • Aortic aneurysm Father         Father with ruptured thoracic aortic aneurysm   • Coronary artery disease Father         Father  from MI at 74   • Heart disease Father    • Hyperlipidemia Father    • Arthritis Father    • Heart attack Maternal  Grandmother 76   • Coronary artery disease Maternal Grandmother         MGROCK deceeased from MI at age 76   • Malig Hyperthermia Neg Hx      Social History     Tobacco Use   • Smoking status: Never   • Smokeless tobacco: Never   Vaping Use   • Vaping Use: Never used   Substance Use Topics   • Alcohol use: No   • Drug use: No     No current facility-administered medications on file prior to encounter.     Current Outpatient Medications on File Prior to Encounter   Medication Sig Dispense Refill   • abemaciclib (VERZENIO) 150 mg tablet chemo tablet Take 1 tablet by mouth 2 (Two) Times a Day. 60 tablet 5   • acetaminophen (TYLENOL) 500 MG tablet Take 500 mg by mouth As Needed.     • Armodafinil 250 MG tablet Take 1 tablet by mouth Daily. 30 tablet 2   • aspirin 81 MG EC tablet Take 81 mg by mouth Daily.     • atorvastatin (LIPITOR) 40 MG tablet TAKE ONE TABLET BY MOUTH ONCE NIGHTLY 30 tablet 0   • carvedilol (COREG) 3.125 MG tablet TAKE ONE TABLET BY MOUTH TWICE A  tablet 1   • folic acid (FOLVITE) 1 MG tablet TAKE ONE TABLET BY MOUTH DAILY 30 tablet 2   • gabapentin (Neurontin) 300 MG capsule Take 1 capsule by mouth Every Night. 60 capsule 2   • Levemir FlexTouch 100 UNIT/ML injection Inject 50 Units under the skin into the appropriate area as directed Daily. 30 mL 0   • levothyroxine (SYNTHROID, LEVOTHROID) 50 MCG tablet Take 1 tablet by mouth Daily. 90 tablet 1   • mupirocin (BACTROBAN) 2 % ointment      • ondansetron (Zofran) 8 MG tablet Take 1 tablet by mouth Every 8 (Eight) Hours As Needed for Nausea or Vomiting. 30 tablet 2   • sertraline (ZOLOFT) 100 MG tablet Take 1.5 tablets by mouth Daily. 135 tablet 0   • vitamin B-12 (CYANOCOBALAMIN) 1000 MCG tablet Take 1,000 mcg by mouth Daily.     • NON FORMULARY Take 5 mg by mouth 2 (Two) Times a Day. Versinio     • NON FORMULARY Inject 1 dose under the skin into the appropriate area as directed Every 30 (Thirty) Days. facidex       No Known Allergies    Objective      Objective     Vital Signs  Temp:  [98.1 °F (36.7 °C)-100.2 °F (37.9 °C)] 98.3 °F (36.8 °C)  Heart Rate:  [54-84] 54  Resp:  [16-18] 18  BP: ()/(34-70) 104/55  SpO2:  [88 %-100 %] 100 %  on  Flow (L/min):  [2-3] 2;   Device (Oxygen Therapy): nasal cannula  Body mass index is 57.75 kg/m².    Physical Exam  Constitutional:       General: She is not in acute distress.     Appearance: She is obese. She is ill-appearing.   HENT:      Head: Normocephalic and atraumatic.      Mouth/Throat:      Mouth: Mucous membranes are moist.      Pharynx: Oropharynx is clear.   Eyes:      Extraocular Movements: Extraocular movements intact.      Conjunctiva/sclera: Conjunctivae normal.      Pupils: Pupils are equal, round, and reactive to light.   Cardiovascular:      Rate and Rhythm: Bradycardia present.   Pulmonary:      Effort: Pulmonary effort is normal.      Breath sounds: Normal breath sounds. No wheezing.   Abdominal:      General: There is no distension.      Palpations: Abdomen is soft.      Tenderness: There is abdominal tenderness (mild). There is no guarding.   Musculoskeletal:      Cervical back: Normal range of motion and neck supple.      Right lower leg: Edema present.      Left lower leg: Edema present.      Comments: MIKKI YANCEY   Skin:     General: Skin is warm and dry.      Coloration: Skin is pale.   Neurological:      General: No focal deficit present.      Mental Status: She is alert and oriented to person, place, and time. Mental status is at baseline.   Psychiatric:         Mood and Affect: Mood normal.         Behavior: Behavior normal.         Thought Content: Thought content normal.         Judgment: Judgment normal.     Results Review:  I reviewed the patient's new clinical results.  I reviewed the patient's new imaging results and agree with the interpretation.  I reviewed the patient's other test results and agree with the interpretation  I personally viewed and interpreted the patient's  EKG/Telemetry data    Lab Results (last 24 hours)     Procedure Component Value Units Date/Time    CBC & Differential [[id number]]  (Abnormal) Collected: 01/03/23 2317    Specimen: Blood Updated: 01/04/23 0103    Narrative:      The following orders were created for panel order CBC & Differential.  Procedure                               Abnormality         Status                     ---------                               -----------         ------                     CBC Auto Differential[[id number]]        Abnormal            Final result                 Please view results for these tests on the individual orders.    Comprehensive Metabolic Panel [[id number]]  (Abnormal) Collected: 01/03/23 2317    Specimen: Blood Updated: 01/04/23 0103     Glucose 201 mg/dL      BUN 25 mg/dL      Creatinine 4.98 mg/dL      Sodium 134 mmol/L      Potassium 3.0 mmol/L      Chloride 97 mmol/L      CO2 29.0 mmol/L      Calcium 8.3 mg/dL      Total Protein 6.7 g/dL      Albumin 2.7 g/dL      ALT (SGPT) 6 U/L      AST (SGOT) 12 U/L      Alkaline Phosphatase 80 U/L      Total Bilirubin 0.6 mg/dL      Globulin 4.0 gm/dL      A/G Ratio 0.7 g/dL      BUN/Creatinine Ratio 5.0     Anion Gap 8.0 mmol/L      eGFR 9.2 mL/min/1.73      Comment: <15 Indicative of kidney failure       Narrative:      GFR Normal >60  Chronic Kidney Disease <60  Kidney Failure <15      Procalcitonin [[id number]]  (Abnormal) Collected: 01/03/23 2317    Specimen: Blood Updated: 01/04/23 0109     Procalcitonin 1.25 ng/mL     Narrative:      As a Marker for Sepsis (Non-Neonates):    1. <0.5 ng/mL represents a low risk of severe sepsis and/or septic shock.  2. >2 ng/mL represents a high risk of severe sepsis and/or septic shock.    As a Marker for Lower Respiratory Tract Infections that require antibiotic therapy:    PCT on Admission    Antibiotic Therapy       6-12 Hrs later    >0.5                Strongly Recommended  >0.25 - <0.5        Recommended   0.1 - 0.25           "Discouraged              Remeasure/reassess PCT  <0.1                Strongly Discouraged     Remeasure/reassess PCT    As 28 day mortality risk marker: \"Change in Procalcitonin Result\" (>80% or <=80%) if Day 0 (or Day 1) and Day 4 values are available. Refer to http://www.[facility]-pct-calculator.com    Change in PCT <=80%  A decrease of PCT levels below or equal to 80% defines a positive change in PCT test result representing a higher risk for 28-day all-cause mortality of patients diagnosed with severe sepsis for septic shock.    Change in PCT >80%  A decrease of PCT levels of more than 80% defines a negative change in PCT result representing a lower risk for 28-day all-cause mortality of patients diagnosed with severe sepsis or septic shock.       CBC Auto Differential [[id number]]  (Abnormal) Collected: 01/03/23 2317    Specimen: Blood Updated: 01/04/23 0103     WBC 4.61 10*3/mm3      RBC 2.50 10*6/mm3      Hemoglobin 7.8 g/dL      Hematocrit 24.0 %      MCV 96.0 fL      MCH 31.2 pg      MCHC 32.5 g/dL      RDW 16.6 %      RDW-SD 57.7 fl      MPV 9.7 fL      Platelets 98 10*3/mm3      Neutrophil % 77.9 %      Lymphocyte % 10.8 %      Monocyte % 7.6 %      Eosinophil % 2.4 %      Basophil % 0.4 %      Immature Grans % 0.9 %      Neutrophils, Absolute 3.59 10*3/mm3      Lymphocytes, Absolute 0.50 10*3/mm3      Monocytes, Absolute 0.35 10*3/mm3      Eosinophils, Absolute 0.11 10*3/mm3      Basophils, Absolute 0.02 10*3/mm3      Immature Grans, Absolute 0.04 10*3/mm3      nRBC 0.0 /100 WBC     Magnesium [[id number]]  (Normal) Collected: 01/03/23 2317    Specimen: Blood Updated: 01/04/23 0140     Magnesium 2.0 mg/dL     COVID-19 and FLU A/B PCR - Swab, Nasopharynx [[id number]]  (Normal) Collected: 01/04/23 0050    Specimen: Swab from Nasopharynx Updated: 01/04/23 0120     COVID19 Not Detected     Influenza A PCR Not Detected     Influenza B PCR Not Detected    Narrative:      Fact sheet for providers: " https://www.fda.gov/media/300552/download    Fact sheet for patients: https://www.fda.gov/media/612149/download    Test performed by PCR.    Urinalysis With Culture If Indicated - Straight Cath [[id number]]  (Abnormal) Collected: 01/04/23 0051    Specimen: Urine from Straight Cath Updated: 01/04/23 0131     Color, UA Red     Comment: Any Substance that causes an abnormal urine color can alter the accuracy of the chemical reactions.        Appearance, UA Turbid     pH, UA 6.0     Specific Gravity, UA 1.019     Glucose, UA Negative     Ketones, UA Negative     Bilirubin, UA Negative     Comment: Results confirmed by alternate method           Blood, UA Large (3+)     Protein, UA >=300 mg/dL (3+)     Leuk Esterase, UA Large (3+)     Nitrite, UA Positive     Urobilinogen, UA 0.2 E.U./dL    Narrative:      In absence of clinical symptoms, the presence of pyuria, bacteria, and/or nitrites on the urinalysis result does not correlate with infection.    Urinalysis, Microscopic Only - Straight Cath [[id number]]  (Abnormal) Collected: 01/04/23 0051    Specimen: Urine from Straight Cath Updated: 01/04/23 0257     RBC, UA 13-20 /HPF      WBC, UA Too Numerous to Count /HPF      Bacteria, UA 4+ /HPF      Squamous Epithelial Cells, UA None Seen /HPF      Hyaline Casts, UA None Seen /LPF      WBC Clumps, UA Large/3+ /HPF      Methodology Manual Light Microscopy    Urine Culture - Urine, Straight Cath [[id number]] Collected: 01/04/23 0051    Specimen: Urine from Straight Cath Updated: 01/04/23 0257    Blood Culture - Blood, Hand, Right [[id number]] Collected: 01/04/23 0059    Specimen: Blood from Hand, Right Updated: 01/04/23 0114    Lactic Acid, Plasma [[id number]]  (Normal) Collected: 01/04/23 0059    Specimen: Blood Updated: 01/04/23 0139     Lactate 0.9 mmol/L     Blood Culture - Blood, Wrist, Right [[id number]] Collected: 01/04/23 0215    Specimen: Blood from Wrist, Right Updated: 01/04/23 0228    Basic Metabolic Panel [[id number]]   (Abnormal) Collected: 01/04/23 0650    Specimen: Blood Updated: 01/04/23 0728     Glucose 194 mg/dL      BUN 27 mg/dL      Creatinine 5.29 mg/dL      Sodium 135 mmol/L      Potassium 2.8 mmol/L      Chloride 94 mmol/L      CO2 27.8 mmol/L      Calcium 7.8 mg/dL      BUN/Creatinine Ratio 5.1     Anion Gap 13.2 mmol/L      eGFR 8.5 mL/min/1.73      Comment: <15 Indicative of kidney failure       Narrative:      GFR Normal >60  Chronic Kidney Disease <60  Kidney Failure <15      CBC Auto Differential [[id number]]  (Abnormal) Collected: 01/04/23 0650    Specimen: Blood Updated: 01/04/23 0846     WBC 3.84 10*3/mm3      RBC 2.15 10*6/mm3      Hemoglobin 7.0 g/dL      Hematocrit 20.7 %      MCV 96.3 fL      MCH 32.6 pg      MCHC 33.8 g/dL      RDW 15.9 %      RDW-SD 55.5 fl      MPV 9.8 fL      Platelets 90 10*3/mm3     Protime-INR [[id number]]  (Abnormal) Collected: 01/04/23 0650    Specimen: Blood Updated: 01/04/23 0720     Protime 16.4 Seconds      INR 1.31    Lactic Acid, Plasma [[id number]]  (Normal) Collected: 01/04/23 0650    Specimen: Blood Updated: 01/04/23 0723     Lactate 0.8 mmol/L     Manual Differential [[id number]]  (Abnormal) Collected: 01/04/23 0650    Specimen: Blood Updated: 01/04/23 0835     Neutrophil % 89.7 %      Lymphocyte % 5.2 %      Monocyte % 3.1 %      Eosinophil % 1.0 %      Basophil % 1.0 %      Neutrophils Absolute 3.44 10*3/mm3      Lymphocytes Absolute 0.20 10*3/mm3      Monocytes Absolute 0.12 10*3/mm3      Eosinophils Absolute 0.04 10*3/mm3      Basophils Absolute 0.04 10*3/mm3      RBC Morphology Normal     WBC Morphology Normal     Platelet Morphology Normal    POC Glucose Once [[id number]]  (Abnormal) Collected: 01/04/23 0754    Specimen: Blood Updated: 01/04/23 0756     Glucose 193 mg/dL      Comment: Meter: [id number] : [id number] Michael Senior RN       POC Glucose Once [[id number]]  (Abnormal) Collected: 01/04/23 1114    Specimen: Blood Updated: 01/04/23 1118     Glucose 220 mg/dL       Comment: Meter: [id number] : [id number] Minar Lali CNA       POC Glucose Once [[id number]]  (Abnormal) Collected: 01/04/23 1632    Specimen: Blood Updated: 01/04/23 1634     Glucose 178 mg/dL      Comment: Meter: [id number] : [id number] Minar Lali CNA             Imaging Results (Last 24 Hours)     Procedure Component Value Units Date/Time    XR Chest 1 View [[id number]] Collected: 01/04/23 0235     Updated: 01/04/23 0235    Narrative:        Patient: AZAEL ALEXANDER  Time Out: 02:34  Exam(s): FILM CXR 1 VIEW     EXAM:    XR Chest, 1 View    CLINICAL HISTORY:     Reason for exam: Fever, shortness of breath.    TECHNIQUE:    Frontal view of the chest.    COMPARISON:  January 20 at 2022.    FINDINGS:    Lungs: Patchy opacities are present at the left lung base, probably   related to atelectasis, but an early infiltrate is not excluded.    Pleural space:  Unremarkable.  No pneumothorax.    Heart: Cardiomegaly is present, with mild pulmonary vascular congestion.    The possibility of early congestive heart failure is not excluded.    Mediastinum:  Unremarkable.    Bones joints: Degenerative disc disease present.    IMPRESSION:       Cardiomegaly is present with mild pulmonary vascular congestion.    Opacities present at the left lung base, probably related to atelectasis,   but an early infiltrate is not excluded.    Impression:          Electronically signed by Chito Loco D.O. on 01-04-23 at 0234          Results for orders placed during the hospital encounter of 07/13/22    Adult Transthoracic Echo Complete w/ Color, Spectral and Contrast if Necessary Per Protocol    Interpretation Summary  · Left ventricular ejection fraction appears to be 56 - 60%. Left ventricular systolic function is normal.  · Left ventricular wall thickness is consistent with mild concentric hypertrophy  · Left ventricular diastolic function is consistent with (grade II w/high LAP) pseudonormalization.  · Normal right ventricular cavity  size and systolic function noted.  · The left atrial cavity is mildly dilated.  · Severe aortic valve stenosis is present.  · Aortic valve area is 0.84 cm2. Peak velocity of the flow distal to the aortic valve is 449.3 cm/s. Aortic valve maximum pressure gradient is 80.8 mmHg. Aortic valve mean pressure gradient is 45.9 mmHg  · There is severe mitral annular calcification the mitral valve leaflets are thickened and appear to be restricted in motion  · Moderate mitral valve stenosis is present. The mitral valve peak gradient is 16.2 mmHg. The mitral valve mean gradient is 6.2 mmHg.  · Mild tricuspid valve regurgitation is present.  · Calculated right ventricular systolic pressure from tricuspid regurgitation is 38 mmHg.  · There is no evidence of pericardial effusion      No orders to display       Assessment/Plan     Active Hospital Problems    Diagnosis  POA   • Acute UTI (urinary tract infection) [N39.0]  Unknown   • Hypokalemia [E87.6]  Unknown   • Severe aortic stenosis [I35.0]  Unknown   • Combined systolic and diastolic congestive heart failure (HCC) [I50.40]  Unknown   • Depression [F32.A]  Unknown   • ANDREW (obstructive sleep apnea) [G47.33]  Unknown   • ESRD (end stage renal disease) ([facility]) [N18.6]  Yes   • Morbid obesity with BMI of 50.0-59.9, adult ([facility]) [E66.01, Z68.43]  Not Applicable   • Metastatic breast cancer ([facility]) [C50.919]  Yes   • Normocytic anemia [D64.9]  Yes   • Diabetes type 2, controlled ([facility]) [E11.9]  Yes   • Hyperlipidemia [E78.5]  Yes   • Hypertension [I10]  Yes      Resolved Hospital Problems   No resolved problems to display.       Ms. Hall is a 62 y.o. non-smoker with a history of metastatic breast cancer, ESRD on hemodialysis, anemia of chronic disease, type 2 diabetes, combined systolic/diastolic CHF, severe aortic stenosis, peripheral neuropathy who presents with malaise/weakness/LUTS and is found to have a UTI with worsening anemia.    Acute UTI  - UA c/w UTI w/ + nitrites, large  LE, 4+ bacteria, many WBC; procal 1.25  - continue ceftriaxone, f/u on urine cultures    ESRD on home HD  Hypokalemia  - nephrology consulted  - patient does home hemodialysis 5x/week  - TuThSat scheduled planned for inpatient  - resume high dose epogen  - f/u am labs    Acute on chronic anemia of chronic disease/underlying malignancy/myelosuppression from chemo   -anemia multifactorial as above  - hgb 7.8, down-trended to 7.0 this am  - agree with transfusion as ordered per Dr. Diaz    Type 2 diabetes mellitus  - continue basal/bolus dosing of insulin  - adjust as needed  - last a1c 1 year ago 6.0; recheck pending    Combined systolic/diastolic CHF  Severe aortic stenosis  - echo as above   - coreg  - D/w Dr. Diaz- cardiology consult for TAVR consideration    Peripheral neuropathy  - home gabapentin- monitor for toxicity    Hypothyroidism  - levothyroxine    Metastatic lobular breast cancer  - followed by Dr. Irvin  - metastatic to bone, omentum  - will c/s given worsening anemia and pt asking about resuming chemo    Depression  - sertraline, armodafinil     ANDREW  - ok for home CPAP    · I discussed the patient's findings and my recommendations with patient, nursing staff and consulting provider. Dr. Diaz    VTE Prophylaxis - SCDs.  Code Status - Full code.     D/w Dr. Joe Harden MD  [facility]  01/04/23  16:46 EST      Electronically signed by Lorena Harden MD at 01/04/23 1646          Emergency Department Notes      Jasmin French, RN at 01/03/23 4548        Pt arrived to the ED via EMS from home. Pt reports she became weaker & weaker over the past week. States today should could not stand up. Performs peritoneal dialysis at home. Reports blood in urine, Vomiting. Pt reports she receives transfusions approx every 6 weeks due to chronic anaemia.     Electronically signed by Jasmin French RN at 01/03/23 2641     Shine Sanchez MD at 01/04/23 0022            "EMERGENCY DEPARTMENT ENCOUNTER    Room Number:  22/22  Date seen:  1/4/2023  PCP: Kiana Gramajo APRN  Historian: Patient,  at bedside who helps with history      HPI:  Chief Complaint: Generalized weakness  A complete HPI/ROS/PMH/PSH/SH/FH are unobtainable due to:   Context: Juana Hall is a 62 y.o. female who presents to the ED c/o generalized weakness.  Patient has had progressive weakness ongoing over the last several weeks.  She states she feels like she might have a \"urinary tract infection\".  She had that she has had some blood in her urine and slight pain with urination.  Patient has a history of end-stage renal disease on peritoneal dialysis and does make urine on most days but has made less recently.  Denies fever at home but was febrile at triage.  She has had occasional cough but denies chest pain or trouble breathing.  She is continue to do peritoneal dialysis and has not had problems.  Denies any change of peritoneal fluid denies any significant abdominal pain.  She has had some nausea and some vomiting.      MEDICAL RECORD REVIEW (non ED)  I was reviewed prior medical records including most recent oncology note.  Patient has a history of multiple medical problems including breast cancer, end-stage renal disease on peritoneal dialysis as well as diabetes, hypertension hyperlipidemia.        Resolved Ambulatory Problems     Diagnosis Date Noted   • No Resolved Ambulatory Problems       ALLERGIES  Patient has no known allergies.        REVIEW OF SYSTEMS  Review of Systems   Constitutional: Positive for fever (Febrile at triage). Negative for unexpected weight change.   Gastrointestinal: Positive for nausea.   Genitourinary: Positive for dysuria and hematuria.   Neurological: Positive for weakness.        PHYSICAL EXAM  ED Triage Vitals [01/03/23 2245]   Temp Heart Rate Resp BP SpO2   100.2 °F (37.9 °C) 70 16 135/70 96 %      Temp src Heart Rate Source Patient Position BP Location " FiO2 (%)   Oral -- -- -- --       Physical Exam    GENERAL: Nontoxic-appearing female in no obvious distress.  Triage vitals reviewed notable for temperature 100.2.  Heart rate 70.  Blood pressure 135/70.  O2 saturations have been running in the upper 80s on room air.  HENT: nares patent  EYES: no scleral icterus  CV: regular rhythm, regular rate- systolic murmur which is chronic per patient  RESPIRATORY: normal effort, decreased breath sounds- O2 sats 95% on 1 L nasal cannula  ABDOMEN: soft morbidly obese without significant tenderness to palpation  MUSCULOSKELETAL: no deformity  NEURO: Strength-mild generalized weakness without focal deficit.  Sensation and coordination are grossly intact.  Speech and mentation are unremarkable  SKIN: warm, dry-fistula in left upper arm without erythema or warmth      Vital signs and nursing notes reviewed.    Ordered the above labs and reviewed the results.      RADIOLOGY  No Radiology Exams Resulted Within Past 24 Hours    Ordered the above noted radiological studies. Reviewed by me in PACS.        PROCEDURES  Procedures        MEDICATIONS GIVEN IN ER  Medications   sodium chloride 0.9 % flush 10 mL (has no administration in time range)   cefTRIAXone (ROCEPHIN) 1 g in sodium chloride 0.9 % 100 mL IVPB-VTB (1 g Intravenous New Bag 1/4/23 0217)       MEDICAL DECISION MAKING, PROGRESS, and CONSULTS    All labs have been independently reviewed by me.  All radiology studies have been reviewed by me and I have also reviewed the radiology report.   EKG's independently viewed and interpreted by me.  Discussion below represents my analysis of pertinent findings related to patient's condition, differential diagnosis, treatment plan and final disposition.      Additional sources:  - Discussed/ obtained information from independent historians:  at bedside    - External (non-ED) record review: Please see documented above    - Chronic or social conditions impacting care: Morbid  obesity, end-stage renal disease on dialysis, chronic anemia    - Shared decision making: I discussed ED evaluation and treatment plan with patient who is in agreement.   Patient very complicated with multiple sources of fever.  While I suspect fevers related to urinary tract infection patient does have chronic heart murmur and would consider endocarditis and peritonitis is less common or likely causes of infection.      Orders placed during this visit:  Orders Placed This Encounter   Procedures   • Blood Culture - Blood,   • Blood Culture - Blood,   • COVID-19 and FLU A/B PCR - Swab, Nasopharynx   • XR Chest 1 View   • [place] Draw   • Comprehensive Metabolic Panel   • Urinalysis With Culture If Indicated - Urine, Catheter   • Procalcitonin   • Lactic Acid, Plasma   • CBC Auto Differential   • Urinalysis, Microscopic Only - Urine, Clean Catch   • Magnesium   • LHA (on-call MD unless specified) Details   • Insert peripheral IV   • Green Top (Gel)   • Lavender Top   • Gold Top - SST   • Light Blue Top   • CBC & Differential           Differential diagnosis:    Please see as documented below in ED course      Independent interpretation of labs, radiology studies, and discussions with consultants:  ED Course as of 01/04/23 0223 Wed Jan 04, 2023   0118 CBC reviewed shows a white blood cell count of 4.6.  Which in general would go against bacterial infection but is not excluded.     [DB]   0118 Hemoglobin of 7.8 is slightly worsened when compared to recent values but patient has chronic anemia and is somewhat near her baseline. [DB]   0124 MDM-complicated patient with multiple medical problems including end-stage renal disease on dialysis.  She does receive peritoneal dialysis on a daily basis at home.  She presents with worsening of generalized weakness.  Having difficulty walking although she generally walks with a walker and is somewhat obese.    On exam patient is well-appearing but is febrile at triage with temp  of 100.2.  On exam I see no obvious clear focus of infection.  She has been somewhat hypoxic on room air but breath sounds sound unremarkable.  Abdominal exam is benign without significant tenderness to palpation. [DB]   0125 Differential diagnosis in this complicated patient would include but is not limited the following:    Urinary tract infection  Upper respiratory tract infection including pneumonia or viral illness  Electrolyte  Worsening of chronic renal failure  Anemia [DB]   0126 At this point patient with generalized weakness and multiple comorbidities.  Urinalysis is abnormal and suggestive of urinary tract infection.  Patient is febrile with temperature of 100.2.  We will go ahead and give IV Rocephin, consult hospitalist about admission for further work-up. [DB]   0143 Chest x-ray independently reviewed shows no obvious pneumonia although x-ray is limited by patient's body habitus and portable film. [DB]   0203 I discussed evaluation treatment this patient with Annabelle from [facility] who will admit on behalf of Dr. Ronald Tatum. [DB]      ED Course User Index  [DB] Shine Sanchez MD         I used full protective equipment while examining this patient.  This includes face mask, gloves and protective eyewear.  I washed my hands before entering the room and immediately upon leaving the room    DIAGNOSIS  Final diagnoses:   Complicated UTI (urinary tract infection)   Generalized weakness   End-stage renal disease on peritoneal dialysis (HCC)   Heart murmur   Chronic anemia   Acute respiratory failure with hypoxia (HCC)       DISPOSITION  Admission        Latest Documented Vital Signs:  As of 02:23 EST  BP- 99/46 HR- 64 Temp- 100.2 °F (37.9 °C) (Oral) O2 sat- 99%         --    Please note that portions of this were completed with a voice recognition program.       Note Disclaimer: At [facility], we believe that sharing information builds trust and better relationships. You are receiving this note because  you are receiving care at [facility] or recently visited. It is possible you will see health information before a provider has talked with you about it. This kind of information can be easy to misunderstand. To help you fully understand what it means for your health, we urge you to discuss this note with your provider.           Shine Sanchez MD  01/04/23 0223      Electronically signed by Shine Sanchez MD at 01/04/23 0223         Vital Signs (last 2 days)     Date/Time Temp Temp src Pulse Resp BP Patient Position SpO2    01/04/23 1831 98.9 (37.2) Oral 55 18 103/39 -- 98    01/04/23 1805 98.6 (37) Oral 56 18 110/60 -- 98    01/04/23 1438 98.3 (36.8) Oral 54 18 104/55 -- 100    01/04/23 1413 98.3 (36.8) Oral 55 18 100/51 -- 100    01/04/23 1315 -- -- -- -- 102/40 -- --    01/04/23 1311 98.4 (36.9) Oral 60 18 94/34 Lying 100    01/04/23 0838 -- Oral 59 18 103/57 -- 100    01/04/23 07:56:24 98.1 (36.7) Oral 56 18 105/47 Lying 100    01/04/23 0549 -- -- 59 -- 98/38 -- 92    01/04/23 0401 -- -- 59 -- 100/43 -- 99    01/04/23 0328 -- -- 60 17 93/39 -- 98    01/04/23 0131 -- -- 63 -- 115/49 -- 99    01/04/23 0101 -- -- 64 -- 99/46 -- 99    01/04/23 0031 -- -- 67 -- 114/58 -- 91    01/04/23 0001 -- -- 65 -- 107/50 -- 98    01/03/23 2331 -- -- 68 -- 101/59 -- 98    01/03/23 2307 -- -- 70 -- -- -- 98    01/03/23 2303 -- -- 84 -- -- -- 88    01/03/23 2245 100.2 (37.9) Oral 70 16 135/70 -- 96        Oxygen Therapy (last 2 days)     Date/Time SpO2 Device (Oxygen Therapy) Flow (L/min) Oxygen Concentration (%) ETCO2 (mmHg)    01/04/23 1831 98 -- -- -- --    01/04/23 1805 98 -- -- -- --    01/04/23 1438 100 -- -- -- --    01/04/23 1413 100 -- -- -- --    01/04/23 1354 -- nasal cannula 2 -- --    01/04/23 1311 100 nasal cannula 2 -- --    01/04/23 1200 -- CPAP -- -- --    01/04/23 0845 -- nasal cannula 2 -- --    01/04/23 0838 100 nasal cannula 2 -- --    01/04/23 07:56:24 100 nasal cannula 3 -- --    01/04/23 0549 92 --  -- -- --    01/04/23 0401 99 -- -- -- --    01/03/23 2307 98 -- -- -- --    01/03/23 2303 88 nasal cannula 2 -- --    01/03/23 2245 96 room air -- -- --        Intake & Output (last 2 days)       01/02 0701 01/03 0700 01/03 0701 01/04 0700 01/04 0701 01/05 0700    Blood   300    IV Piggyback  100     Total Intake(mL/kg)  100 (0.6) 300 (1.8)    Urine (mL/kg/hr)  40     Total Output  40     Net  +60 +300           Stool Unmeasured Occurrence   1 x        Lines, Drains & Airways     Active LDAs     Name Placement date Placement time Site Days    Peripheral IV 01/03/23 2316 Right Antecubital 01/03/23 2316  Antecubital  less than 1    External Urinary Catheter 01/04/23  0850  --  less than 1    Hemodialysis Cath Double --  PRESENT UPON ADMISSION  --  Subclavian  --                Current Facility-Administered Medications   Medication Dose Route Frequency Provider Last Rate Last Admin   • acetaminophen (TYLENOL) tablet 650 mg  650 mg Oral Q4H PRN Annabelle Keller APRN        Or   • acetaminophen (TYLENOL) 160 MG/5ML solution 650 mg  650 mg Oral Q4H PRN Annabelle Keller APRN        Or   • acetaminophen (TYLENOL) suppository 650 mg  650 mg Rectal Q4H PRN Annabelle Keller APRN       • Armodafinil 250 mg  250 mg Oral Daily Lorena Harden MD   250 mg at 01/04/23 1352   • atorvastatin (LIPITOR) tablet 40 mg  40 mg Oral Nightly Lorena Harden MD       • cefTRIAXone (ROCEPHIN) 1 g in sodium chloride 0.9 % 100 mL IVPB-VTB  1 g Intravenous Q24H Annabelle Keller APRN       • dextrose (D50W) (25 g/50 mL) IV injection 25 g  25 g Intravenous Q15 Min PRN Annabelle Keller APRN       • dextrose (GLUTOSE) oral gel 15 g  15 g Oral Q15 Min PRN Annabelle Keller APRN       • [START ON 1/5/2023] epoetin jana-epbx (RETACRIT) injection 20,000 Units  20,000 Units Intravenous Once per day on Mon Wed Fri Tai Antonio MD       • folic acid (FOLVITE) tablet 1,000 mcg  1,000 mcg Oral Daily Lorena Harden  MD ROCK   1,000 mcg at 01/04/23 0858   • gabapentin (NEURONTIN) capsule 300 mg  300 mg Oral Nightly Lorena Harden MD       • glucagon (human recombinant) (GLUCAGEN DIAGNOSTIC) injection 1 mg  1 mg Intramuscular Q15 Min PRN Annabelle Keller APRN       • insulin glargine (LANTUS, SEMGLEE) injection 25 Units  25 Units Subcutaneous QAM Lorena Harden MD   25 Units at 01/04/23 0858   • insulin lispro (ADMELOG) injection 0-7 Units  0-7 Units Subcutaneous TID AC Annabelle Keller APRN   2 Units at 01/04/23 1735   • levothyroxine (SYNTHROID, LEVOTHROID) tablet 50 mcg  50 mcg Oral Daily Lorena Harden MD   50 mcg at 01/04/23 0858   • nitroglycerin (NITROSTAT) SL tablet 0.4 mg  0.4 mg Sublingual Q5 Min PRN Annabelle Keller APRN       • ondansetron (ZOFRAN) injection 4 mg  4 mg Intravenous Q6H PRN Annabelle Keller APRN   4 mg at 01/04/23 1033   • sertraline (ZOLOFT) tablet 150 mg  150 mg Oral Daily Lorena Harden MD   150 mg at 01/04/23 0900   • sodium chloride 0.9 % flush 10 mL  10 mL Intravenous PRN Shine Sacnhez MD       • sodium chloride 0.9 % flush 10 mL  10 mL Intravenous Q12H Annabelle Keller APRN   10 mL at 01/04/23 0859   • sodium chloride 0.9 % flush 10 mL  10 mL Intravenous PRN Annabelle Keller APRN       • sodium chloride 0.9 % infusion 40 mL  40 mL Intravenous PRN Annabelle Keller APRN       • vitamin B-12 (CYANOCOBALAMIN) tablet 1,000 mcg  1,000 mcg Oral Daily Lorena Harden MD   1,000 mcg at 01/04/23 0900       Blood Administration Record (From admission, onward)    Transfusions already released     Ordered     Start    01/04/23 1115  Transfuse RBC, 2 Units Infuse Each Unit Over: 3.5H  Transfusion      Released Time Blood Unit Number Status   01/04/23 1413   22  [id number]  [id number] Completed 01/04/23 1808   01/04/23 1808   23  [id number]  [id number] Transfusing       01/04/23 1114                Lab Results (last 48 hours)     Procedure Component Value  Units Date/Time    Hemoglobin A1c [[id number]]  (Abnormal) Collected: 01/04/23 0650    Specimen: Blood Updated: 01/04/23 1803     Hemoglobin A1C 5.70 %     Narrative:      Hemoglobin A1C Ranges:    Increased Risk for Diabetes  5.7% to 6.4%  Diabetes                     >= 6.5%  Diabetic Goal                < 7.0%    POC Glucose Once [[id number]]  (Abnormal) Collected: 01/04/23 1632    Specimen: Blood Updated: 01/04/23 1634     Glucose 178 mg/dL      Comment: Meter: [id number] : [id number] Minar Lali CNA       POC Glucose Once [[id number]]  (Abnormal) Collected: 01/04/23 1114    Specimen: Blood Updated: 01/04/23 1118     Glucose 220 mg/dL      Comment: Meter: [id number] : [id number] Minar Lali CNA       CBC Auto Differential [[id number]]  (Abnormal) Collected: 01/04/23 0650    Specimen: Blood Updated: 01/04/23 0846     WBC 3.84 10*3/mm3      RBC 2.15 10*6/mm3      Hemoglobin 7.0 g/dL      Hematocrit 20.7 %      MCV 96.3 fL      MCH 32.6 pg      MCHC 33.8 g/dL      RDW 15.9 %      RDW-SD 55.5 fl      MPV 9.8 fL      Platelets 90 10*3/mm3     Manual Differential [[id number]]  (Abnormal) Collected: 01/04/23 0650    Specimen: Blood Updated: 01/04/23 0835     Neutrophil % 89.7 %      Lymphocyte % 5.2 %      Monocyte % 3.1 %      Eosinophil % 1.0 %      Basophil % 1.0 %      Neutrophils Absolute 3.44 10*3/mm3      Lymphocytes Absolute 0.20 10*3/mm3      Monocytes Absolute 0.12 10*3/mm3      Eosinophils Absolute 0.04 10*3/mm3      Basophils Absolute 0.04 10*3/mm3      RBC Morphology Normal     WBC Morphology Normal     Platelet Morphology Normal    POC Glucose Once [[id number]]  (Abnormal) Collected: 01/04/23 0754    Specimen: Blood Updated: 01/04/23 0756     Glucose 193 mg/dL      Comment: Meter: [id number] : [id number] Michael Senior RN       Basic Metabolic Panel [[id number]]  (Abnormal) Collected: 01/04/23 0650    Specimen: Blood Updated: 01/04/23 0728     Glucose 194 mg/dL      BUN 27 mg/dL      Creatinine 5.29  mg/dL      Sodium 135 mmol/L      Potassium 2.8 mmol/L      Chloride 94 mmol/L      CO2 27.8 mmol/L      Calcium 7.8 mg/dL      BUN/Creatinine Ratio 5.1     Anion Gap 13.2 mmol/L      eGFR 8.5 mL/min/1.73      Comment: <15 Indicative of kidney failure       Narrative:      GFR Normal >60  Chronic Kidney Disease <60  Kidney Failure <15      Lactic Acid, Plasma [[id number]]  (Normal) Collected: 01/04/23 0650    Specimen: Blood Updated: 01/04/23 0723     Lactate 0.8 mmol/L     Protime-INR [[id number]]  (Abnormal) Collected: 01/04/23 0650    Specimen: Blood Updated: 01/04/23 0720     Protime 16.4 Seconds      INR 1.31    Urinalysis, Microscopic Only - Straight Cath [[id number]]  (Abnormal) Collected: 01/04/23 0051    Specimen: Urine from Straight Cath Updated: 01/04/23 0257     RBC, UA 13-20 /HPF      WBC, UA Too Numerous to Count /HPF      Bacteria, UA 4+ /HPF      Squamous Epithelial Cells, UA None Seen /HPF      Hyaline Casts, UA None Seen /LPF      WBC Clumps, UA Large/3+ /HPF      Methodology Manual Light Microscopy    Urine Culture - Urine, Straight Cath [[id number]] Collected: 01/04/23 0051    Specimen: Urine from Straight Cath Updated: 01/04/23 0257    Blood Culture - Blood, Wrist, Right [[id number]] Collected: 01/04/23 0215    Specimen: Blood from Wrist, Right Updated: 01/04/23 0228    Magnesium [[id number]]  (Normal) Collected: 01/03/23 2317    Specimen: Blood Updated: 01/04/23 0140     Magnesium 2.0 mg/dL     Lactic Acid, Plasma [[id number]]  (Normal) Collected: 01/04/23 0059    Specimen: Blood Updated: 01/04/23 0139     Lactate 0.9 mmol/L     Urinalysis With Culture If Indicated - Straight Cath [[id number]]  (Abnormal) Collected: 01/04/23 0051    Specimen: Urine from Straight Cath Updated: 01/04/23 0131     Color, UA Red     Comment: Any Substance that causes an abnormal urine color can alter the accuracy of the chemical reactions.        Appearance, UA Turbid     pH, UA 6.0     Specific Crawford, UA 1.019      "Glucose, UA Negative     Ketones, UA Negative     Bilirubin, UA Negative     Comment: Results confirmed by alternate method           Blood, UA Large (3+)     Protein, UA >=300 mg/dL (3+)     Leuk Esterase, UA Large (3+)     Nitrite, UA Positive     Urobilinogen, UA 0.2 E.U./dL    Narrative:      In absence of clinical symptoms, the presence of pyuria, bacteria, and/or nitrites on the urinalysis result does not correlate with infection.    COVID-19 and FLU A/B PCR - Swab, Nasopharynx [[id number]]  (Normal) Collected: 01/04/23 0050    Specimen: Swab from Nasopharynx Updated: 01/04/23 0120     COVID19 Not Detected     Influenza A PCR Not Detected     Influenza B PCR Not Detected    Narrative:      Fact sheet for providers: https://www.fda.gov/media/643902/download    Fact sheet for patients: https://www.fda.gov/media/742181/download    Test performed by PCR.    Blood Culture - Blood, Hand, Right [[id number]] Collected: 01/04/23 0059    Specimen: Blood from Hand, Right Updated: 01/04/23 0114    Procalcitonin [[id number]]  (Abnormal) Collected: 01/03/23 2317    Specimen: Blood Updated: 01/04/23 0109     Procalcitonin 1.25 ng/mL     Narrative:      As a Marker for Sepsis (Non-Neonates):    1. <0.5 ng/mL represents a low risk of severe sepsis and/or septic shock.  2. >2 ng/mL represents a high risk of severe sepsis and/or septic shock.    As a Marker for Lower Respiratory Tract Infections that require antibiotic therapy:    PCT on Admission    Antibiotic Therapy       6-12 Hrs later    >0.5                Strongly Recommended  >0.25 - <0.5        Recommended   0.1 - 0.25          Discouraged              Remeasure/reassess PCT  <0.1                Strongly Discouraged     Remeasure/reassess PCT    As 28 day mortality risk marker: \"Change in Procalcitonin Result\" (>80% or <=80%) if Day 0 (or Day 1) and Day 4 values are available. Refer to http://www.[employer]-pct-calculator.com    Change in PCT <=80%  A decrease of PCT levels " below or equal to 80% defines a positive change in PCT test result representing a higher risk for 28-day all-cause mortality of patients diagnosed with severe sepsis for septic shock.    Change in PCT >80%  A decrease of PCT levels of more than 80% defines a negative change in PCT result representing a lower risk for 28-day all-cause mortality of patients diagnosed with severe sepsis or septic shock.       Comprehensive Metabolic Panel [[id number]]  (Abnormal) Collected: 01/03/23 2317    Specimen: Blood Updated: 01/04/23 0103     Glucose 201 mg/dL      BUN 25 mg/dL      Creatinine 4.98 mg/dL      Sodium 134 mmol/L      Potassium 3.0 mmol/L      Chloride 97 mmol/L      CO2 29.0 mmol/L      Calcium 8.3 mg/dL      Total Protein 6.7 g/dL      Albumin 2.7 g/dL      ALT (SGPT) 6 U/L      AST (SGOT) 12 U/L      Alkaline Phosphatase 80 U/L      Total Bilirubin 0.6 mg/dL      Globulin 4.0 gm/dL      A/G Ratio 0.7 g/dL      BUN/Creatinine Ratio 5.0     Anion Gap 8.0 mmol/L      eGFR 9.2 mL/min/1.73      Comment: <15 Indicative of kidney failure       Narrative:      GFR Normal >60  Chronic Kidney Disease <60  Kidney Failure <15      CBC & Differential [[id number]]  (Abnormal) Collected: 01/03/23 2317    Specimen: Blood Updated: 01/04/23 0103    Narrative:      The following orders were created for panel order CBC & Differential.  Procedure                               Abnormality         Status                     ---------                               -----------         ------                     CBC Auto Differential[[id number]]        Abnormal            Final result                 Please view results for these tests on the individual orders.    CBC Auto Differential [[id number]]  (Abnormal) Collected: 01/03/23 2317    Specimen: Blood Updated: 01/04/23 0103     WBC 4.61 10*3/mm3      RBC 2.50 10*6/mm3      Hemoglobin 7.8 g/dL      Hematocrit 24.0 %      MCV 96.0 fL      MCH 31.2 pg      MCHC 32.5 g/dL      RDW 16.6 %       RDW-SD 57.7 fl      MPV 9.7 fL      Platelets 98 10*3/mm3      Neutrophil % 77.9 %      Lymphocyte % 10.8 %      Monocyte % 7.6 %      Eosinophil % 2.4 %      Basophil % 0.4 %      Immature Grans % 0.9 %      Neutrophils, Absolute 3.59 10*3/mm3      Lymphocytes, Absolute 0.50 10*3/mm3      Monocytes, Absolute 0.35 10*3/mm3      Eosinophils, Absolute 0.11 10*3/mm3      Basophils, Absolute 0.02 10*3/mm3      Immature Grans, Absolute 0.04 10*3/mm3      nRBC 0.0 /100 WBC     [place] Draw [[id number]] Collected: 01/03/23 2317    Specimen: Blood Updated: 01/04/23 0031    Narrative:      The following orders were created for panel order [place] Draw.  Procedure                               Abnormality         Status                     ---------                               -----------         ------                     Green Top (Gel)[[id number]]                                  Final result               Lavender Top[[id number]]                                     Final result               Gold Top - SST[[id number]]                                   Final result               Light Blue Top[[id number]]                                   Final result                 Please view results for these tests on the individual orders.    Gold Top - SST [[id number]] Collected: 01/03/23 2317    Specimen: Blood Updated: 01/04/23 0031     Extra Tube Hold for add-ons.     Comment: Auto resulted.       Green Top (Gel) [[id number]] Collected: 01/03/23 2317    Specimen: Blood Updated: 01/04/23 0031     Extra Tube Hold for add-ons.     Comment: Auto resulted.       Lavender Top [[id number]] Collected: 01/03/23 2317    Specimen: Blood Updated: 01/04/23 0031     Extra Tube hold for add-on     Comment: Auto resulted       Light Blue Top [[id number]] Collected: 01/03/23 2317    Specimen: Blood Updated: 01/04/23 0031     Extra Tube Hold for add-ons.     Comment: Auto resulted           Orders (last 48 hrs)      Start     Ordered    01/05/23 0600   Basic Metabolic Panel  Daily,   Status:  Canceled       01/04/23 0727    01/05/23 0600  CBC & Differential  Daily       01/04/23 0727    01/05/23 0600  Magnesium  Daily       01/04/23 0727    01/05/23 0600  Phosphorus  Daily,   Status:  Canceled       01/04/23 0727    01/05/23 0600  Renal Function Panel  Daily       01/04/23 1446    01/05/23 0600  Calcium, Ionized  Morning Draw         01/04/23 1446    01/05/23 0000  Hemodialysis Inpatient  Once        Comments: Heparin 2000 Unit IV bolus   Albumin 12.5 g every hour as needed for systolic blood pressure less than 100    If systolic blood pressure drops below 100 decrease ultrafiltration goal by 500 mLs  If within 30 minutes systolic blood pressure remains below 100 decrease by another 500mL    01/04/23 1446    01/05/23 0000  epoetin jana-epbx (RETACRIT) injection 20,000 Units  Once per day on Mon Wed Fri 01/04/23 1446    01/04/23 2200  cefTRIAXone (ROCEPHIN) 1 g in sodium chloride 0.9 % 100 mL IVPB-VTB  Every 24 Hours         01/04/23 0300    01/04/23 2100  gabapentin (NEURONTIN) capsule 300 mg  Nightly         01/04/23 0753    01/04/23 2100  atorvastatin (LIPITOR) tablet 40 mg  Nightly         01/04/23 0753    01/04/23 1730  potassium chloride (K-DUR,KLOR-CON) ER tablet 20 mEq  Once         01/04/23 1642    01/04/23 1635  Hemoglobin A1c  Once         01/04/23 1634    01/04/23 1635  POC Glucose Once  PROCEDURE ONCE         01/04/23 1632    01/04/23 1423  Direct Antiglobulin Test (Direct Rose)  Once         01/04/23 1422    01/04/23 1405  Antibody Identification  Once         01/04/23 1404    01/04/23 1121  Inpatient Cardiology Consult  Once        Specialty:  Cardiology  Provider:  Luis Rivers MD    01/04/23 1120    01/04/23 1119  POC Glucose Once  PROCEDURE ONCE         01/04/23 1114    01/04/23 1115  Verify Informed Consent for Blood Product Administration  Once         01/04/23 1115    01/04/23 1115  Prepare RBC, 2 Units  Blood - Once          01/04/23 1115    01/04/23 1115  Type & Screen  Once         01/04/23 1115    01/04/23 1114  Transfuse RBC, 2 Units Infuse Each Unit Over: 3.5H  Transfusion       01/04/23 1115    01/04/23 1000  vitamin B-12 (CYANOCOBALAMIN) tablet 1,000 mcg  Daily         01/04/23 0753    01/04/23 1000  sertraline (ZOLOFT) tablet 150 mg  Daily         01/04/23 0753    01/04/23 0934  Hematology & Oncology Inpatient Consult  Once        Specialty:  Hematology and Oncology  Provider:  Leodan Irvin Jr., MD    01/04/23 0933    01/04/23 0900  sodium chloride 0.9 % flush 10 mL  Every 12 Hours Scheduled         01/04/23 0259    01/04/23 0900  folic acid (FOLVITE) tablet 1,000 mcg  Daily         01/04/23 0753    01/04/23 0900  levothyroxine (SYNTHROID, LEVOTHROID) tablet 50 mcg  Daily         01/04/23 0753    01/04/23 0900  aspirin EC tablet 81 mg  Daily,   Status:  Discontinued         01/04/23 0753    01/04/23 0900  Armodafinil 250 mg  Daily         01/04/23 0753    01/04/23 0800  Oral Care  2 Times Daily       01/04/23 0259    01/04/23 0757  POC Glucose Once  PROCEDURE ONCE         01/04/23 0754    01/04/23 0755  insulin glargine (LANTUS, SEMGLEE) injection 25 Units  Every Morning         01/04/23 0753    01/04/23 0730  insulin lispro (ADMELOG) injection 0-7 Units  3 Times Daily Before Meals         01/04/23 0300    01/04/23 0727  Manual Differential  Once         01/04/23 0726    01/04/23 0700  POC Glucose TID AC  3 Times Daily Before Meals       01/04/23 0300    01/04/23 0600  Basic Metabolic Panel  Morning Draw         01/04/23 0300    01/04/23 0600  CBC Auto Differential  Morning Draw         01/04/23 0300    01/04/23 0600  Protime-INR  Morning Draw         01/04/23 0300    01/04/23 0600  Lactic Acid, Plasma  Morning Draw         01/04/23 0300    01/04/23 0400  Vital Signs  Every 4 Hours       01/04/23 0259    01/04/23 0301  Daily Weights  Daily       01/04/23 0300    01/04/23 0300  Inpatient Nephrology Consult  Once         Specialty:  Nephrology  Provider:  Tai Antonio MD    01/04/23 0300    01/04/23 0259  ondansetron (ZOFRAN) injection 4 mg  Every 6 Hours PRN         01/04/23 0300    01/04/23 0259  acetaminophen (TYLENOL) tablet 650 mg  Every 4 Hours PRN        See Hyperspace for full Linked Orders Report.    01/04/23 0300    01/04/23 0259  acetaminophen (TYLENOL) 160 MG/5ML solution 650 mg  Every 4 Hours PRN        See Hyperspace for full Linked Orders Report.    01/04/23 0300    01/04/23 0259  acetaminophen (TYLENOL) suppository 650 mg  Every 4 Hours PRN        See Hyperspace for full Linked Orders Report.    01/04/23 0300 01/04/23 0259  Intake & Output  Every Shift       01/04/23 0259 01/04/23 0259  Weigh Patient  Once         01/04/23 0259 01/04/23 0259  Oxygen Therapy- Nasal Cannula; Titrate for SPO2: 90% - 95%  Continuous         01/04/23 0259 01/04/23 0259  Insert Peripheral IV  Once         01/04/23 0259 01/04/23 0259  Saline Lock & Maintain IV Access  Continuous,   Status:  Canceled         01/04/23 0259 01/04/23 0259  Do NOT Hold Basal or Correction Scale Insulin When Patient is NPO, Hold Scheduled Mealtime (Bolus) Insulin if NPO  Continuous         01/04/23 0259 01/04/23 0259  Code Status and Medical Interventions:  Continuous         01/04/23 0300    01/04/23 0259  Place Sequential Compression Device  Once         01/04/23 0300    01/04/23 0259  Maintain Sequential Compression Device  Continuous         01/04/23 0300    01/04/23 0259  Telemetry - Maintain IV Access  Continuous         01/04/23 0300 01/04/23 0259  Continuous Cardiac Monitoring  Continuous        Comments: Follow Standing Orders As Outlined in Process Instructions (Open Order Report to View Full Instructions)    01/04/23 0300 01/04/23 0259  May Be Off Telemetry for Tests  Continuous         01/04/23 0300 01/04/23 0259  Notify Provider if ACLS Protocol Activated  Until Discontinued         01/04/23 0300 01/04/23  0259  Pulse Oximetry, Continuous  Continuous         01/04/23 0300    01/04/23 0259  Fall Precautions  Continuous         01/04/23 0300    01/04/23 0259  Diet: Diabetic Diets; Consistent Carbohydrate; Texture: Regular Texture (IDDSI 7); Fluid Consistency: Thin (IDDSI 0)  Diet Effective Now         01/04/23 0300    01/04/23 0258  Telemetry - Pulse Oximetry  Continuous PRN,   Status:  Canceled      Comments: If Patient Develops Unresponsiveness, Acute Dyspnea, Cyanosis or Suspected Hypoxemia Start Continuous Pulse Ox Monitoring, Apply Oxygen & Notify Provider    01/04/23 0300    01/04/23 0258  nitroglycerin (NITROSTAT) SL tablet 0.4 mg  Every 5 Minutes PRN         01/04/23 0300    01/04/23 0258  dextrose (D50W) (25 g/50 mL) IV injection 25 g  Every 15 Minutes PRN         01/04/23 0300    01/04/23 0258  glucagon (human recombinant) (GLUCAGEN DIAGNOSTIC) injection 1 mg  Every 15 Minutes PRN         01/04/23 0300    01/04/23 0258  sodium chloride 0.9 % flush 10 mL  As Needed         01/04/23 0259    01/04/23 0258  sodium chloride 0.9 % infusion 40 mL  As Needed         01/04/23 0259    01/04/23 0258  dextrose (GLUTOSE) oral gel 15 g  Every 15 Minutes PRN         01/04/23 0300    01/04/23 0258  Urine Culture - Urine, Straight Cath  Once         01/04/23 0257    01/04/23 0224  Inpatient Admission  Once         01/04/23 0223    01/04/23 0224  Cardiac Monitoring  Continuous,   Status:  Canceled        Comments: Follow Standing Orders As Outlined in Process Instructions (Open Order Report to View Full Instructions)    01/04/23 0223    01/04/23 0202  cefTRIAXone (ROCEPHIN) 1 g in sodium chloride 0.9 % 100 mL IVPB-VTB  Once         01/04/23 0200    01/04/23 0201  [facility] (on-call MD unless specified) Details  Once        Specialty:  Hospitalist  Provider:  (Not yet assigned)    01/04/23 0200    01/04/23 0121  Magnesium  Once         01/04/23 0120    01/04/23 0120  XR Chest 1 View  1 Time Imaging         01/04/23 0120    01/04/23  0113  Urinalysis, Microscopic Only - Straight Cath  Once         01/04/23 0112    01/04/23 0054  CBC Auto Differential  Once         01/04/23 0053    01/04/23 0032  CBC & Differential  Once         01/04/23 0032    01/04/23 0032  Comprehensive Metabolic Panel  Once         01/04/23 0032    01/04/23 0032  Urinalysis With Culture If Indicated - Straight Cath  Once         01/04/23 0032    01/04/23 0032  Blood Culture - Blood, Wrist, Right  Once         01/04/23 0032    01/04/23 0032  Blood Culture - Blood, Hand, Right  Once         01/04/23 0032    01/04/23 0032  Procalcitonin  Once         01/04/23 0032    01/04/23 0032  Lactic Acid, Plasma  Once         01/04/23 0032    01/04/23 0032  COVID-19 and FLU A/B PCR - Swab, Nasopharynx  Once         01/04/23 0032    01/03/23 2318  Insert peripheral IV  Once         01/03/23 2317    01/03/23 2318  [place] Draw  Once         01/03/23 2317    01/03/23 2318  Green Top (Gel)  PROCEDURE ONCE         01/03/23 2317    01/03/23 2318  Lavender Top  PROCEDURE ONCE         01/03/23 2317 01/03/23 2318  Gold Top - SST  PROCEDURE ONCE         01/03/23 2317 01/03/23 2318  Light Blue Top  PROCEDURE ONCE         01/03/23 2317 01/03/23 2317  sodium chloride 0.9 % flush 10 mL  As Needed         01/03/23 2317    Unscheduled  Follow Hypoglycemia Standing Orders For Blood Glucose <70 & Notify Provider of Treatment  As Needed      Comments: Follow Hypoglycemia Orders As Outlined in Process Instructions (Open Order Report to View Full Instructions)  Notify Provider Any Time Hypoglycemia Treatment is Administered    01/04/23 0300    Unscheduled  Oxygen Therapy- Nasal Cannula; Titrate for SPO2: 90% - 95%  Continuous PRN      Comments: If Patient Develops Unresponsiveness, Acute Dyspnea, Cyanosis or Suspected Hypoxemia Start Continuous Pulse Ox Monitoring, Apply Oxygen & Notify Provider    01/04/23 0300    Unscheduled  ECG 12 Lead Other; Acute Chest Pain or Dysrhythmia  As Needed       Comments: Nurse to Release if Patient Expericences Acute Chest Pain or Dysrhythmias    01/04/23 0300    Unscheduled  Potassium  As Needed      Comments: For Ventricular Arrhythmias      01/04/23 0300    Unscheduled  Magnesium  As Needed      Comments: For Ventricular Arrhythmias      01/04/23 0300    Unscheduled  Troponin  As Needed      Comments: For Chest Pain      01/04/23 0300    Unscheduled  Blood Gas, Arterial -  As Needed      Comments: Draw for Acute Dyspnea, Cyanosis, Suspected Hypoxemia or UnresponsivenessNotify Provider of Results      01/04/23 0300    Unscheduled  Initiate ACLS Protocol For Life Threatening Dysrhythmias (If Appropriate for Patient)  As Needed       01/04/23 0300    Unscheduled  Up With Assistance  As Needed       01/04/23 0300    Unscheduled  Patient May Use Home CPAP / BIPAP For Sleep or As Needed  As Needed       01/04/23 0944                 Consult Notes (last 48 hours)      Luis Rivers MD at 01/04/23 1636      Consult Orders    1. Inpatient Cardiology Consult [[id number]] ordered by Tarik Diaz MD at 01/04/23 1120               Referring Provider:       Patient Care Team:  Kiana Gramajo APRN as PCP - General (Family Medicine)  Pasha Harkins MD as Referring Physician (Cardiology)  Leodan Irvin Jr., MD as Consulting Physician (Hematology and Oncology)  Tai Antonio MD as Consulting Physician (Nephrology)      Reason for Consultation:   Severe degenerative aortic valve stenosis  Chronic heart failure with preserved ejection fraction  Bicuspid aortic valve  Aortic valve stenosis       HISTORY OF PRESENT ILLNESS:   62-year-old female with a medical history of metastatic breast cancer, end-stage renal disease on hemodialysis, normocytic anemia, obesity, immobility, chronic normocytic anemia, and bicuspid aortic valve with severe stenosis who presents with worsening fatigue and severe anemia along with progressive thrombocytopenia.    She did  undergo a transthoracic echocardiogram in July 2022 with severe degenerative aortic valve stenosis and a mean gradient of 45 mmHg across her bicuspid aortic valve.  Her case was reviewed by the structural heart team. Secondary to her multiple issues she was felt to be not to be an acceptable candidate for transcatheter aortic valve replacement.  This was discussed by all of our team members in the structural heart conference.    Her main complaint this point in time is fatigue and dyspnea.  She has been followed by nephrology and hematology/oncology.  Currently receiving packed cells.      Review of Systems  All other systems reviewed and negative.       Current Medications:   Current Facility-Administered Medications:   •  acetaminophen (TYLENOL) tablet 650 mg, 650 mg, Oral, Q4H PRN **OR** acetaminophen (TYLENOL) 160 MG/5ML solution 650 mg, 650 mg, Oral, Q4H PRN **OR** acetaminophen (TYLENOL) suppository 650 mg, 650 mg, Rectal, Q4H PRN, Annabelle Keller APRN  •  Armodafinil 250 mg, 250 mg, Oral, Daily, Lorena Harden MD, 250 mg at 01/04/23 1352  •  atorvastatin (LIPITOR) tablet 40 mg, 40 mg, Oral, Nightly, Lorena Harden MD  •  cefTRIAXone (ROCEPHIN) 1 g in sodium chloride 0.9 % 100 mL IVPB-VTB, 1 g, Intravenous, Q24H, Annabelle Keller APRN  •  dextrose (D50W) (25 g/50 mL) IV injection 25 g, 25 g, Intravenous, Q15 Min PRN, Annabelle Keller APRN  •  dextrose (GLUTOSE) oral gel 15 g, 15 g, Oral, Q15 Min PRN, Annabelle Keller APRN  •  [START ON 1/5/2023] epoetin jana-epbx (RETACRIT) injection 20,000 Units, 20,000 Units, Intravenous, Once per day on Mon Wed Fri, Tai Antonio MD  •  folic acid (FOLVITE) tablet 1,000 mcg, 1,000 mcg, Oral, Daily, Lorena Harden MD, 1,000 mcg at 01/04/23 0858  •  gabapentin (NEURONTIN) capsule 300 mg, 300 mg, Oral, Nightly, Lorena Harden MD  •  glucagon (human recombinant) (GLUCAGEN DIAGNOSTIC) injection 1 mg, 1 mg, Intramuscular, Q15 Min PRN,  "Annabelle Keller APRN  •  insulin glargine (LANTUS, SEMGLEE) injection 25 Units, 25 Units, Subcutaneous, QAM, Lorena Harden MD, 25 Units at 01/04/23 0858  •  insulin lispro (ADMELOG) injection 0-7 Units, 0-7 Units, Subcutaneous, TID AC, Annabelle Keller APRN, 3 Units at 01/04/23 1246  •  levothyroxine (SYNTHROID, LEVOTHROID) tablet 50 mcg, 50 mcg, Oral, Daily, Lorena Harden MD, 50 mcg at 01/04/23 0858  •  nitroglycerin (NITROSTAT) SL tablet 0.4 mg, 0.4 mg, Sublingual, Q5 Min PRN, Annabelle Keller APRN  •  ondansetron (ZOFRAN) injection 4 mg, 4 mg, Intravenous, Q6H PRN, Annabelle Keller APRN, 4 mg at 01/04/23 1033  •  sertraline (ZOLOFT) tablet 150 mg, 150 mg, Oral, Daily, Lorena Harden MD, 150 mg at 01/04/23 0900  •  sodium chloride 0.9 % flush 10 mL, 10 mL, Intravenous, PRN, Shine Sanchez MD  •  sodium chloride 0.9 % flush 10 mL, 10 mL, Intravenous, Q12H, Annabelle Keller APRN, 10 mL at 01/04/23 0859  •  sodium chloride 0.9 % flush 10 mL, 10 mL, Intravenous, PRN, Annabelle Keller APRN  •  sodium chloride 0.9 % infusion 40 mL, 40 mL, Intravenous, PRN, Annabelle Keller APRN  •  vitamin B-12 (CYANOCOBALAMIN) tablet 1,000 mcg, 1,000 mcg, Oral, Daily, Lorena Harden MD, 1,000 mcg at 01/04/23 0900     Allergies: Patient has no known allergies.      Vital Signs   Temp:  [98.1 °F (36.7 °C)-100.2 °F (37.9 °C)] 98.3 °F (36.8 °C)  Heart Rate:  [54-84] 54  Resp:  [16-18] 18  BP: ()/(34-70) 104/55  Flowsheet Rows    Flowsheet Row First Filed Value   Admission Height 170.2 cm (67\") Documented at 01/03/2023 2245   Admission Weight 165 kg (363 lb 12.1 oz) Documented at 01/03/2023 2245          General Appearance:    Morbidly obese   Head:    Normocephalic, without obvious abnormality, atraumatic       Neck:   No adenopathy, supple, no thyromegaly, no carotid bruit, no    JVD   Lungs:     Clear to auscultation bilaterally, no wheezes, rales, or     rhonchi    Heart:    " Normal rate, regular rhythm, III/VI systolic murmur peak late, no rub, no gallop   Chest Wall:    No abnormalities observed   Abdomen:     Normal bowel sounds, soft, nontender, nondistended,            no rebound tenderness   Extremities:   No cyanosis, clubbing.  1+ bilateral lower extremity   Pulses:   Pulses palpable and equal bilaterally   Skin:   No bleeding or rash       Neurologic:   Cranial nerves 2 - 12 grossly intact, sensation intact             Results Review: I personally viewed and interpreted the patient's EKG/Telemetry data      Assessment & Plan   1. Metastatic lobular breast cancer  2. ESRD: On hemodialysis  3. Anemia secondary to ESRD  4. Myelosuppression  5.  Chronic heart failure with preserved ejection fraction: Secondary to severe degenerative aortic valve stenosis  6.  Urinary tract infection  7.  Morbid obesity    -Patient and I have had a conversation regarding aortic valve stenosis and symptoms  -I think that her symptoms are clearly multifactorial in the setting of her morbid obesity, metastatic breast cancer and end-stage renal disease  -I do agree with the initial impression that transcatheter aortic valve replacement should not be our first approach in this case.   -Case has been discussed with the structural heart team and we are in agreement  -I have recommended continued close monitoring while an inpatient.  Currently being transfused packed cells.  I do think the balloon aortic valvuloplasty as a temporizing/palliative measure is reasonable but I would not recommend doing that in the setting of severe anemia and urinary tract infection.  Could be done next week or as outpatient depending on clinical course.  We will continue to follow      I discussed the patient's findings and my recommendations with the patient        Electronically signed by Luis Rivers MD at 01/04/23 9817     Tai Antonio MD at 01/04/23 3333      Consult Orders    1. Inpatient Nephrology  Consult [[id number]] ordered by Annabelle Keller APRN at 23 0300                                                                                                               [facility]                                                                                             Nephrology Initial Consult Note    Patient Identification:  Name: Juana Hall MRN: [id number]  Age: 62 y.o. : 1960  Sex: female  Date:2023    Requesting Physician: As per consult order.  Reason for Consultation: ESRD, dialysis management  Information from:patient/ family/ chart      History of Present Illness: This is a 62 y.o. year old female with end-stage renal disease.  She is actually on home hemodialysis.  Multiple notes have documented that she is on peritoneal dialysis but she performs home hemodialysis 5 days/week through a left upper extremity AV fistula.  Her previous CVC has been removed.  She has metastatic breast cancer and is on palliative chemotherapy.  She has chronic anemia and requires frequent blood transfusions despite high doses of BEAU's administered with outpatient hemodialysis.  She presented to the ER last night complaining of increasing weakness over the last 1 week.  I saw her last week in the home hemodialysis office and she was not complaining of any weakness or dysuria at that time.  She was found to have a UTI, hemoglobin of 7 and a potassium of 2.8.  She denies any fevers or chills, no shortness of breath chest pain or edema.  Her last dialysis was last night and she reports no problems with her treatments.    The following medical history and medications personally reviewed by me:    Problem List:   Patient Active Problem List    Diagnosis    • *Complicated UTI (urinary tract infection) [N39.0]    • Bicuspid aortic valve [Q23.1]    • Chronic anemia [D64.9]    • Rectal bleeding [K62.5]    • Generalized weakness [R53.1]    • COVID-19 virus detected [U07.1]    • Hypocalcemia  [E83.51]    • Morbid obesity with BMI of 50.0-59.9, adult (HCC) [E66.01, Z68.43]    • ESRD (end stage renal disease) (HCC) [N18.6]    • Pancytopenia due to chemotherapy (HCC) [D61.810]    • Hydronephrosis [N13.30]    • Severe malnutrition (HCC) [E43]    • RYAN (acute kidney injury) (HCC) [N17.9]    • Anemia, chronic disease [D63.8]    • Diastolic CHF, chronic (HCC) [I50.32]    • Metabolic acidosis [E87.20]    • Frequent UTI [N39.0]    • Anxiety and depression [F41.9, F32.A]    • Stage 3b chronic kidney disease (HCC) [N18.32]    • Anemia in stage 3 chronic kidney disease (HCC) [N18.30, D63.1]    • Iron deficiency anemia [D50.9]    • Elevated serum creatinine [R79.89]    • Metastatic breast cancer (HCC) [C50.919]    • Normocytic anemia [D64.9]    • History of right breast cancer [Z85.3]    • Omental mass [K66.8]    • Peripheral neuropathy [G62.9]    • Acquired hypothyroidism [E03.9]    • Anxiety [F41.9]    • Depression [F32.A]    • Diabetes type 2, controlled (HCC) [E11.9]    • Hyperlipidemia [E78.5]    • Heart murmur [R01.1]    • Hypertension [I10]    • Post-traumatic osteoarthritis of multiple joints [M15.3]    • Psoriasis [L40.9]    • Radiculopathy [M54.10]      Allergies:  No Known Allergies    Review of Systems: as per HPI, in addition:    General:      Complains of weakness / fatigue,                       No fevers / chills                       no weight loss  HEENT:       no dysphagia / odynophagia  Neck:           normal range of motion, no swelling  Respiratory: no cough / congestion                      No shortness of air                       No wheezing  CV:              No chest pain                       No palpitations  Abdomen/GI: no nausea / vomiting                      No diarrhea / constipation                      No abdominal pain  :             no dysuria / urinary frequency                       No urgency, normal output  Endocrine:   no polyuria / polydipsia,                      No heat  "or cold intolerance  Skin:           no rashes or skin breakdown   Vascular:   No edema                     No claudication  Psych:        no depression/ anxiety  Neuro:        no focal weakness, no seizures  Musculoskeletal: no joint pain or deformities      Physical Exam:  Vitals:   Temp (24hrs), Av.8 °F (37.1 °C), Min:98.1 °F (36.7 °C), Max:100.2 °F (37.9 °C)    /51   Pulse 55   Temp 98.3 °F (36.8 °C) (Oral)   Resp 18   Ht 170.2 cm (67\")   Wt (!) 167 kg (368 lb 11.2 oz)   LMP  (LMP Unknown)   SpO2 100%   BMI 57.75 kg/m²   Intake/Output:     Intake/Output Summary (Last 24 hours) at 2023 1437  Last data filed at 2023 0300  Gross per 24 hour   Intake 100 ml   Output 40 ml   Net 60 ml        Wt Readings from Last 1 Encounters:   23 0838 (!) 167 kg (368 lb 11.2 oz)   23 2245 (!) 165 kg (363 lb 12.1 oz)       Exam:    General Appearance:  Awake, alert, oriented x3, no acute distress  Obese, chronically ill-appearing   Head and Face:  Normocephalic, atraumatic, mucus membranes moist, oropharynx clear   Eyes:  No icterus, pupils equal round and reactive to light, extraocular movements intact    ENMT: Moist mucosa, tongue symmetric    Neck: Supple  no jugular venous distention  no thyromegaly   Pulmonary:  Respiratory effort: Normal  Auscultation of lungs: Clear bilaterally  No wheezes  No rhonchi  Good air movement, good expansion   Chest wall:  No tenderness or deformity   Cardiovascular:  Auscultation of the heart: Normal rhythm, no murmurs  Trace ankle edema of bilateral lower extremities   Abdomen:  Abdomen: soft, non-tender, normal bowel sounds all four quadrants, no masses   Liver and spleen: no hepatosplenomegaly   Musculoskeletal: Digits and nails: normal  Normal range of motion  No joint swelling or gross deformities    Skin: Skin inspection: color normal, no visible rashes or lesions  Skin palpation: texture, turgor normal, no palpable lesions   Lymphatic:  no cervical " lymphadenopathy    Psychiatric: Judgement and insight: normal  Orientation to person place and time: normal  Mood and affect: normal       DATA:  Radiology and Labs:  The following labs independently reviewed by me, additional AM labs ordered  Old records independently reviewed showing metastatic breast cancer, ESRD on home HD  The following radiologic studies independently viewed by me, findings chest x-ray with cardiomegaly mild vascular congestion  Interval notes, chart personally reviewed by me.  I have reviewed and summarized old records as detailed above  Plan of care discussed with patient and her  at bedside  New problems include UTI with weakness    Dialysis patient access: Left upper extremity AV fistula    Risk/ complexity of medical care/ medical decision making: Moderate complexity    Radiology:    ASSESSMENT:   End-stage renal disease on home hemodialysis 5 days/week.  She had a full treatment yesterday.  We will keep her on a Tuesday Thursday Saturday schedule this admission    Complicated UTI (urinary tract infection), on IV antibiotics    Diabetes type 2, controlled ([facility])    Hyperlipidemia    Hypertension    Normocytic anemia, chronic secondary to chemotherapy and renal disease.  On high-dose BEAU, Mircera with outpatient dialysis, transfusion planned    Metastatic breast cancer, palliative chemotherapy per oncology    Morbid obesity with BMI of 50.0-59.9, adult (HCC)    ESRD (end stage renal disease) (HCC)  Hypocalcemia, corrects to normal due to low albumin      DISCUSSION/PLAN:   No need for dialysis today  Please note that the patient is on home hemodialysis, not peritoneal dialysis as previously documented in the chart this admission  Will give potassium replacement orally x1 today  IV antibiotics for UTI, follow-up on culture results  Agree with transfusion  Did have some vascular congestion on chest x-ray but UF challenge may be difficult given her chronic hypotension.  Goal UF 2 L with  HD tomorrow  Resume high-dose Epogen with HD  Follow-up a.m. labs including phosphorus, magnesium and ionized calcium      Continue to monitor electrolytes and volume closely, avoid IV contrast and nephrotoxic medications     I appreciate the consult request, please call if any questions      Tai Antonio M.D  [facility]  Office phone number: [phone]  Answering service phone number: [phone]      1/4/2023        Dictation via Dragon dictation software      Electronically signed by Tai Antonio MD at 01/04/23 7356     Tarik Diaz MD at 01/04/23 1003      Consult Orders    1. Hematology & Oncology Inpatient Consult [[id number]] ordered by Lorena Harden MD at 01/04/23 2686               [facility] INITIAL INPATIENT CONSULTATION NOTE    REASON FOR CONSULTATION:    Metastatic lobular breast cancer (ER/ME positive, HER-2/tj negative), anemia secondary to CKD3, CHF, peripheral neuropathy, chemotherapy related nausea chemotherapy-induced myelosuppression    HISTORY OF PRESENT ILLNESS:  Juana Hall is a 62 y.o. female who we are asked to see today in consultation for her history of metastatic lobular carcinoma.  Her treatment for this includes Faslodex, Verzenio that has been increased in October though with associated anemia and was assessed 12/8/2022 with plans to undergo a follow-up PET/CT in 3 weeks.  This PET/CT 12/29/2022 showed large masslike uptake overlying the right axilla right mastectomy operative bed poss related to injection, scattered focal FDG avid lesions per osseous assessment with the majority demonstrating degree of avidity decreased from previous.  The patient has had additional issues with anemia secondary end-stage renal disease, her malignancy/chronic disease and myelosuppression from CDK 4/6 inhibitor and GI blood loss with hemoglobin approximately 7 g% requiring transfusion when she is at this level.  She was transfused 12/9/2022 she  continues on home dialysis 5 days/week x 2 hours and is also followed by cardiology with CHF and aortic stenosis but without plans for TAVR considering her her life expectancy.  Please note, additionally, she has peripheral neuropathy in bilateral lower extremities and left upper, nausea, issues with depression, left perinephric stranding secondary malignancy and hydronephrosis stable, right thyroid nodules stable, previous issues with hypocalcemia and was hospitalized in late January 2022 secondary to COVID.    She presented to the ER 1/3/2022 with generalized weakness over the last several weeks, potential urinary tract infection and underwent a number of studies demonstrating abnormal urinalysis, culture pending, blood cultures pending, BUN/creatinine of 27 and 5.29, calcium 7.8, H&H of 7.0 and 20.7.  Patient given ceftriaxone in emergency room.    Interval history:    1/4/2023  T98.1, pulse 60, respirations 18, /67, weight 368 pounds, H&H 7.0 and 20.7, platelet count of 90,000  Patient is seen in observation unit and we have discussed her issues to date including her PET/CT findings which are improved for bony findings with an artifact in the right chest that would be consistent with her previous radiation site in artifactual uptake.  The patient and her  are frustrated about whether TAVR recommendations have been completed with her symptoms currently exacerbated by her significant anemia.    Medications    Current Facility-Administered Medications:   •  acetaminophen (TYLENOL) tablet 650 mg, 650 mg, Oral, Q4H PRN **OR** acetaminophen (TYLENOL) 160 MG/5ML solution 650 mg, 650 mg, Oral, Q4H PRN **OR** acetaminophen (TYLENOL) suppository 650 mg, 650 mg, Rectal, Q4H PRN, Annabelle Keller, APRN  •  Armodafinil 250 mg, 250 mg, Oral, Daily, Lorena Harden MD  •  aspirin EC tablet 81 mg, 81 mg, Oral, Daily, Lorena Harden MD, 81 mg at 01/04/23 0858  •  atorvastatin (LIPITOR) tablet 40 mg, 40  mg, Oral, Nightly, Lorena Harden MD  •  cefTRIAXone (ROCEPHIN) 1 g in sodium chloride 0.9 % 100 mL IVPB-VTB, 1 g, Intravenous, Q24H, Annabelle Keller APRN  •  dextrose (D50W) (25 g/50 mL) IV injection 25 g, 25 g, Intravenous, Q15 Min PRN, Annabelle Keller APRN  •  dextrose (GLUTOSE) oral gel 15 g, 15 g, Oral, Q15 Min PRN, Annabelle Keller APRN  •  folic acid (FOLVITE) tablet 1,000 mcg, 1,000 mcg, Oral, Daily, Lorena Harden MD, 1,000 mcg at 01/04/23 0858  •  gabapentin (NEURONTIN) capsule 300 mg, 300 mg, Oral, Nightly, Lorena Harden MD  •  glucagon (human recombinant) (GLUCAGEN DIAGNOSTIC) injection 1 mg, 1 mg, Intramuscular, Q15 Min PRN, Annabelle Keller APRN  •  insulin glargine (LANTUS, SEMGLEE) injection 25 Units, 25 Units, Subcutaneous, QAM, Lorena Harden MD, 25 Units at 01/04/23 0858  •  insulin lispro (ADMELOG) injection 0-7 Units, 0-7 Units, Subcutaneous, TID AC, Annabelle Keller APRN, 2 Units at 01/04/23 0859  •  levothyroxine (SYNTHROID, LEVOTHROID) tablet 50 mcg, 50 mcg, Oral, Daily, Lorena Harden MD, 50 mcg at 01/04/23 0858  •  nitroglycerin (NITROSTAT) SL tablet 0.4 mg, 0.4 mg, Sublingual, Q5 Min PRN, Annabelle Keller APRN  •  ondansetron (ZOFRAN) injection 4 mg, 4 mg, Intravenous, Q6H PRN, Annabelle Keller APRN  •  sertraline (ZOLOFT) tablet 150 mg, 150 mg, Oral, Daily, Lorena Harden MD, 150 mg at 01/04/23 0900  •  sodium chloride 0.9 % flush 10 mL, 10 mL, Intravenous, PRN, Shine Sanchez MD  •  sodium chloride 0.9 % flush 10 mL, 10 mL, Intravenous, Q12H, Annabelle Keller APRN, 10 mL at 01/04/23 0859  •  sodium chloride 0.9 % flush 10 mL, 10 mL, Intravenous, PRN, Annabelle Keller, BETY  •  sodium chloride 0.9 % infusion 40 mL, 40 mL, Intravenous, PRN, Annabelle Keller, BETY  •  vitamin B-12 (CYANOCOBALAMIN) tablet 1,000 mcg, 1,000 mcg, Oral, Daily, Lorena Harden MD, 1,000 mcg at 01/04/23 0900  Allergies  No Known  Allergies  Review of Systems  Fourteen systems have been reviewed with the patient and are positive per HPI only.     Objective:     Vitals:    01/04/23 0838   BP: 103/57   Pulse: 59   Resp: 18   Temp:    SpO2: 100%     GENERAL:  Well-developed, well-nourished, morbidly obese in no acute distress.   SKIN:  Warm, dry without rashes, purpura or petechiae.  HEAD:  Normocephalic.  EYES:  Pupils equal, round and reactive to light.  EOMs intact.  Conjunctivae normal.  EARS:  Hearing intact.  NOSE:  Septum midline.  No excoriations or nasal discharge.  MOUTH:  Tongue is well-papillated; no stomatitis or ulcers.  Lips normal.  THROAT:  Oropharynx without lesions or exudates.  NECK:  Supple with good range of motion; no thyromegaly or masses, no JVD.  LYMPHATICS:  No cervical, supraclavicular, axillary or inguinal adenopathy.  CHEST: Radiation dermatitis right upper anterior chest wall, lungs clear to percussion and auscultation. Good airflow.  CARDIAC:  Regular rate and rhythm with 3/6 holosystolic murmur, rubs or gallops. Normal S1,S2.  ABDOMEN:  Soft, nontender with no organomegaly or masses.  EXTREMITIES:  No clubbing, cyanosis but lymphedema present lower extremities associated with her obesity.  NEUROLOGICAL:  Cranial Nerves II-XII grossly intact.  No focal neurological deficits.  PSYCHIATRIC:  Normal affect and mood.      DIAGNOSTIC DATA:    IMAGING:        Assessment/Plan:   This is a 62 y.o. female with:    *Metastatic lobular breast cancer (ER/[place] positive, HER-2/tj negative):  • Previous stage IIIC right breast cancer in 2003, received neoadjuvant chemotherapy (unknown regimen) with subsequent bilateral mastectomies 1/22/2004 with right axillary dissection.  Residual 10-12 cm invasive lobular carcinoma on the right breast with 14/16+ nodes with extranodal extension.  Received adjuvant carboplatin and Navelbine chemotherapy x4 cycles  • Attempted adjuvant radiation to the chest wall however interrupted due to  cellulitis that required removal of implants in August 2004  • Received tamoxifen from 6/22/2004 until June 2009.  Transitioned to Femara and received until June 2014  • Identification of CT findings concerning for carcinomatosis on CT scans and June 2019.  • PET scan confirmed hypermetabolic activity in the omentum as well as small retroperitoneal lymph nodes.  • CT-guided omental biopsy 7/30/2019 with metastatic lobular carcinoma of breast primary, ER positive (greater than 95%), AK positive (90%), HER-2/tj negative (1+ IHC)  • [facility] liquid biopsy 2/5/2020: MSI undetermined, mutations in BETH, NF1, CHEK2.  No evidence of PIK3CA mutation.  • Subsequent [facility] germline testing 11/12/2021 with BETH VUS (c.2864C>A) and POLE VUS (c.631A>T)  • Initiation of Aromasin and Ibrance in August 2019  • Difficulty with myelosuppression related to Ibrance requiring dose alterations, eventually changed to 100 mg for 7 days on followed by 7 days off.  • CT chest abdomen pelvis 10/26/2021 with increase in left hydronephrosis with increase in left perinephric and periureteral stranding. Decrease in stone in the left kidney lower pole (7 mm).  Stable small retroperitoneal lymph and left periaortic lymph nodes.  • PET scan 11/10/2021 with multiple bilateral hypermetabolic nodular pulmonary lesions, asymmetric moderate to severe left hydronephrosis with ill-defined fat stranding and soft tissue thickening in the renal sinus and surrounding the left ureter, hypermetabolic left retroperitoneal lymph node with slight increase in size, ill-defined hypermetabolic thickening in the inferior omentum with increase in size, uptake and C7 (indeterminate, recommended MRI).  Short segments of uptake in the mid and distal esophagus possibly related to gastritis.  • PET scan findings felt to be consistent with progressive malignancy.  Patient declined repeat omental biopsy.   • Options for further treatment discussed on 11/12/2021.  In  light of renal failure and dialysis, options are more limited.  Recommendation to continue Ibrance and discontinue Aromasin, begin Faslodex.  Discussed possible future use of single agent Taxol.    • Aromasin discontinued 11/12/2021  • On 11/22/2021 initiation of Faslodex with continuation of Ibrance (100 mg daily for 7 days on followed by 7 days off).  • MRI cervical spine 11/18/2021 showed the right C7 normality to likely represent metastatic disease.  With solitary bone lesion, did not recommend pursuit of bone modifying agent.  • Following 2 cycles Faslodex/Ibrance, PET scan on 1/11/2022 showed no change in the soft tissue superior to the dome of the bladder with hypermetabolic activity (likely related to carcinomatosis).  Significant decrease in injection of fat around the left renal pelvis and stable retroperitoneal hypermetabolic lymph nodes, decrease in size and activity in small bilateral pulmonary nodules.  Findings felt to represent evidence of response to treatment.    • Ibrance held briefly beginning 1/28/2022 due to hospitalization with COVID-19 infection and C. difficile colitis.  Patient developed leukopenia/neutropenia.  Ibrance resumed at previous dosing (100 mg daily 7 days on followed by 7 days off) on 2/9/22.  • Following 5 cycles Faslodex/Ibrance PET scan 4/19/2022 with evidence of mixed response.  New hypermetabolic lesion spinous process L2 (SUV 5.1) and slight increase in activity left clavicular metastasis (SUV increased 4.6-8.1).  C7 hypermetabolic mixed lytic and blastic bone lesion was unchanged.  Decrease in uptake involving omental thickening.  Stable soft tissue thickening around the left renal pelvis and ureter.  Discussion again regarding potential pursuit of IV chemotherapy with Taxol versus continuation of Ibrance and Faslodex with radiation to sites of bony disease at L2, left clavicle, C7.  Patient opted to defer initiation of systemic chemotherapy and continue Ibrance/Faslodex  with consideration of radiation.  • Initiated monthly Xgeva on 5/19/2022 (cleared with nephrology).  Xgeva subsequently held due to significant hypocalcemia.  Decision made to forego further Xgeva with persistent hypocalcemia.  • Palliative radiation received to lumbar spine regarding L2 metastasis 5/23- 6/7/2022  • Ibrance held during radiation therapy beginning 5/22/2022.  Ibrance resumed 6/16/2022.  • PET scan 7/21/2022 with stable hypermetabolic abdominal pelvic lymph nodes and subcarinal lymph node, stable bone lesions at C7, left clavicle.  Stable soft tissue thickening around the left renal pelvis with left hydronephrosis.  Uptake in adrenal glands felt to be likely reactive (no change in size).  No activity noted at L2 (previously radiated).  New hypermetabolic lesion right anterior sixth rib.  • With evidence of further slight progression in bone on PET scan (new right sixth rib lesion), on 7/28/2022 discontinued Ibrance and plan to transition to abemaciclib with continuation of Faslodex.    • On 8/4/2022 initiated abemaciclib at reduced dose of 50 mg twice daily.  • On 8/25/2022, increased abemaciclib dose to 100 mg twice daily  • PET scan 10/6/2022 with stable findings with exception of left acetabular activity report noted new activity however on prior PET scan question of focal activity present previously.  No corresponding CT abnormality and significant increase in SUV at sacral lesion.  Scan otherwise stable.  Decision to continue current treatment  • Abemaciclib dose increased on 10/13/2022 up to 150 mg twice daily  • Patient returns today in follow-up due for cycle 14 Faslodex 500 mg IM every 4 weeks and continuing on abemaciclib 150 mg twice daily that was initiated 8/22 at 50 mg daily with dose increase on 8/25/2022 to 100 mg twice daily, and further increase to 150 mg twice daily on 10/13/2022.  Patient is tolerating the dose increase well with no overt changes in her stool patterns.  She is  currently having significant more fatigue with dropping hemoglobin as further discussed below.  We will proceed with transfusion.  Otherwise she will undergo repeat PET scan in 3 weeks and then follow-up with Dr. Irvin in 4 weeks for scan review and cycle 15 Faslodex pending scan results  • Subsequent PET/CT with masslike area overlying the right axilla?  Injection site, reduction in avid bony lesions left acetabulum, left distal clavicle, and C7 vertebral body, no additional other findings compared to previous     *Anemia secondary to ESRD, underlying malignancy/chronic disease, myelosuppression from CDK 4/6 inhibitor, GI blood loss:  • Anemia secondary to ESRD, malignancy with exacerbation by use of Ibrance  • Previous evidence of folate deficiency 8/12/2019 with level 3.84, initiated folic acid 1 mg daily  • Previous evidence of iron deficiency, received Injectafer on 8/7/2020 750 mg IV x1 dose.  Second dose not administered due to onset of fever, subsequent iron studies precluded further administration of IV iron.  • Previous low normal B12 level initiated oral B12 1000 mcg daily.  • Patient had previously received Procrit and required intermittent transfusion support  • Following initiation of hemodialysis, management of BEAU transitioned to nephrology, receiving Epogen with dialysis.  • Ongoing transfusion support prompted alteration in Ibrance dosing on 8/23/2021.  • After alteration in Ibrance dosing, hemoglobin improved and remained stable in the 9-10 range.  • Improved but ongoing intermittent need for transfusion support despite Ibrance dose alteration  • Stool negative for occult blood x3 on 11/2/2021  • Patient with reduced dialysis frequency to 2 days/week with concurrent decrease in Epogen dosing corresponding again to increased transfusion requirement.  • Epogen dose increased per nephrology to 20,000 units twice weekly with dialysis on Mondays and Fridays  • Ibrance again exacerbating anemia with  ongoing intermittent transfusion requirements.  Ibrance subsequently discontinued on 7/28/2022 and patient initiated abemaciclib on 8/4/2022 which may be less myelosuppressive.  • Additional transfusions required with hemoglobin on 3/31/2022 6.6, hemoglobin 4/21/2022 of 6.4, hemoglobin on 5/5/2022 of 6.8, hemoglobin 6.4 on 7/14/2022, hemoglobin 6.4 on 7/28/2022, hemoglobin 6.8 on 8/18/2022.  • Patient did develop transient visible blood in stool in July 2022  • Anemia evaluation 7/28/2022 with iron 32, ferritin 412, iron saturation 15%, TIBC 210, folate 19.9, B12 925, haptoglobin 300, .  • Stool positive for occult blood x3 on 8/1/2022  • Patient was seen by GI on 8/16/2022, felt to be high risk for endoscopic evaluation and elected to forego EGD/colonoscopy for now  • Patient transitioned from hemodialysis in clinic to home hemodialysis 5 days/week x 2 hours beginning 8/29/2022.  With transition to home dialysis, nephrology transition the patient from Epogen to Mircera administered every 4 weeks in their office, initiated 8/22/2022.  • Patient currently continues Mircera every 4 weeks.  She did need a blood transfusion after hemoglobin on 10/27/2022 was 6.6.  • Hemoglobin today 7.1.  Patient is symptomatic with extreme fatigue today, requesting to go ahead with transfusion.  • Patient seen in observation 1/4/2022 after admission with weakness, hemoglobin 7.0, medic at 20.7, 2 unit transfusion anticipated     *ESRD on HD:  • Patient with previous CKD3/4  • Hospitalization 4/29-5/4/2021 with RYAN/CKD, UTI, anemia.  Required initiation of hemodialysis Tuesday Thursday Saturday.   • Decrease in frequency of dialysis to twice per week.  She continues to produce a significant amount of urine.   • Status post left upper extremity AV fistula placement on 11/3/2021 by vascular surgery  • Attempt to hold further dialysis on 1/11/2022 with close monitoring of her laboratory and clinical parameters.  Dialysis quickly  resumed due to development of hyperkalemia.  • Patient was undergoing dialysis 2 days/week on Mondays and Fridays.    • On 8/22/2022 patient transitioned to use of home dialysis device 5 days/week x 2 hours.     *CHF with severe aortic stenosis:  • Echocardiogram 7/12/2019 showing ejection fraction 48% with moderate dilation of the LV cavity, global hypokinesis, grade 2 diastolic dysfunction, mild to moderate aortic stenosis, trivial pericardial effusion.  • Echocardiogram 7/19/2021 with ejection fraction 56%, left atrial volume moderately increased, grade 2 diastolic dysfunction, severe calcification aortic valve, moderate aortic stenosis, severe mitral calcification, mild mitral stenosis, marked elevation in RVSP from tricuspid regurgitation.  • Echocardiogram on 7/13/2022 with ejection fraction 56-60%, grade 2 diastolic dysfunction, severe aortic stenosis.    • Cardiac catheterization 8/23/2022 showed normal coronary arteries, mild to moderate pulmonary hypertension.    • She was evaluated by cardiothoracic surgery Dr. Pagan and there was discussion regarding potential candidacy for TAVR although there was concern given her underlying comorbidities including her metastatic breast cancer.  I discussed patient's prognosis with Dr. Pagan.  She does have opportunities for additional treatment of her malignancy with intravenous chemotherapy and is willing to pursue this in the future when needed.  It is unclear as to her expected survival however given her multiple severe comorbidities with metastatic breast cancer, ESRD on dialysis, severe aortic stenosis, severe anemia.  There is consideration of possible pursuit of balloon valvuloplasty initially to assess her symptomatic response and then consider pursuit of TAVR in the future.   • Patient is awaiting further decision from cardiology as to recommendations for further management.  She was seen on 10/25/2022 but reports she was told that her life expectancy is not  long enough for procedure.  She will follow-up in January 2023 and discussed once again.  • Plan to request this assessment during patient's observation stay     *Left upper and bilateral lower extremity peripheral neuropathy:  • Patient reports that left upper extremity neuropathy was not present from previous chemotherapy but occurred after hospitalization that required intubation and critical care stay.  Apparently felt to represent critical care neuropathy.  Improved over time.  • Bilateral lower extremity neuropathy felt to be related to diabetes  • May have future implications regarding treatment for breast cancer.  • Patient reports that peripheral neuropathy symptoms continue in the bilateral lower extremities, unchanged.  This will be a consideration with potential future use of Taxol     *Uterine/endometrial activity on PET scan:  • Patient experienced postmenopausal vaginal bleeding in 2013, negative endometrial biopsy and gynecologic evaluation.  • With findings on PET scan with enlarging uterus and some hypermetabolic activity, referred back to Dr. Oliveros in gynecology.    • 9/19/2019 D&C with hysteroscopy by Dr. Oliveros, no significant intraoperative findings, pathologic results with benign findings, no evidence of malignancy.     *Nausea:  • Mild, relieved with Zofran.   • Patient experienced improvement in nausea after initiating hemodialysis  • No recent nausea     *Myelosuppression secondary to CDK 4/6 inhibitor:  • Patient was able to maintain adequate WBC after dosing alteration on Ibrance to treatment at 100 mg daily for 7 days on followed by 7 days off.  • Subsequent transition from Ibrance to abemaciclib  • Today, WBC 2.97.  Stable  • Assessment 1/4/2022 white count of 3840     *Depression  • Patient with history of depression, worsened after the death of her son in November 2021  • With evidence of progressive malignancy in November 2021, patient agreeable to referral to supportive  oncology  • Patient currently receiving gabapentin 300 mg nightly, Zoloft 150 mg daily, armodafinil 250 mg daily  • Patient does continue with difficulties regarding depression that wax and wane.  Currently symptoms under fair control.  Patient last seen by supportive oncology on 9/26/2022.     *Left perinephric stranding secondary to malignancy with hydronephrosis:  • CT 4/22/2021 showed interval enlargement of 2 staghorn calculi in the left kidney with persistent left perinephric stranding  • Hospitalization 4/29-5/4/2021 with RYAN/CKD, UTI, anemia.  Required 2 unit PRBC transfusion and initiated hemodialysis Tuesday Thursday Saturday.    • Discussion from urology regarding potential need for surgical procedure to address staghorn calculus left kidney  • CT scan 7/2/2021 with only 1 remaining left renal stone identified which had decreased in size.  Patient appears to have passed the other stone.  • As above, CT 10/26/2021 with more prominent left hydronephrosis and increase in left perinephric and periureteral stranding.  Felt to possibly be related to passage of recent stone versus partial obstruction secondary to debris or thrombus in the left ureter versus pyelonephritis.  Patient however with no clinical evidence of recent stone passage nor pyelonephritis. Malignancy felt to be the cause of CT changes around the left kidney at this point.   • Left ureteral stent replacement 12/16/2021  • PET scan 1/11/2022 with decrease in fat injection near left renal pelvis, stent in satisfactory position.  Mild residual hydronephrosis on the left.  • Ureteral stent replaced on 3/10/2022  • PET scan 4/19/2022 with no hydronephrosis, left ureteral stent in place  • PET scan 7/21/2022 with persistent left hydronephrosis, ureteral stent in place  • PET scan 10/6/2022 with left nephroureteral stent in place, overall stable findings  • UTI documented 1/3/2023, empiric Rocephin given     *Right thyroid nodules  • Patient underwent  prior thyroid biopsy by her report with benign findings many years ago, records not available  • On MRI cervical spine 11/18/2021, finding of 1.8 cm right thyroid nodule  • Thyroid ultrasound 11/26/2021 with right-sided thyroid nodules, 1 nodule was hypoechoic, solid measuring 2 cm with biopsy recommended.  • Thyroid ultrasound results reviewed with patient.  In light of her metastatic breast cancer, renal failure the significance of the thyroid nodule is felt to be much less.  We discussed possibility of thyroid biopsy however patient is inclined to forego this, especially since she has had a biopsy performed in the past with benign findings by her report.    • No hypermetabolic activity identified on PET scan 1/11/2022 in the neck     *Hospitalization 1/28-1/30/2022 due to COVID-19 infection and C. difficile colitis  • Patient fully vaccinated with 3 doses Moderna COVID-19 vaccine  • Patient developed symptoms 1/26/2022 with abrupt onset sinus congestion, mild cough, subsequently developed nausea and diarrhea on 1/27/2022.  Significant fatigue.  • Patient tested positive in emergency department on 1/28/2022 and was admitted  • Patient maintained O2 saturation in mid 90% range on room air  • Patient received remdesivir  • Patient was also found to be positive for C. difficile colitis, received course of oral vancomycin.  • Symptoms from both COVID-19 and C. difficile colitis ultimately resolved.     *Hypocalcemia  • Patient developed significant hypocalcemia after receiving Xgeva on 5/19/2022  • Calcium management per nephrology  • No further Xgeva planned due to persistent significant hypocalcemia  • Calcium today 7.8         Tarik Diaz MD    Electronically signed by Tarik Diaz MD at 01/04/23 9615

## 2023-01-04 NOTE — CONSULTS
[facility] INITIAL INPATIENT CONSULTATION NOTE    REASON FOR CONSULTATION:    Metastatic lobular breast cancer (ER/MN positive, HER-2/tj negative), anemia secondary to CKD3, CHF, peripheral neuropathy, chemotherapy related nausea chemotherapy-induced myelosuppression    HISTORY OF PRESENT ILLNESS:  Juana Hall is a 62 y.o. female who we are asked to see today in consultation for her history of metastatic lobular carcinoma.  Her treatment for this includes Faslodex, Verzenio that has been increased in October though with associated anemia and was assessed 12/8/2022 with plans to undergo a follow-up PET/CT in 3 weeks.  This PET/CT 12/29/2022 showed large masslike uptake overlying the right axilla right mastectomy operative bed poss related to injection, scattered focal FDG avid lesions per osseous assessment with the majority demonstrating degree of avidity decreased from previous.  The patient has had additional issues with anemia secondary end-stage renal disease, her malignancy/chronic disease and myelosuppression from CDK 4/6 inhibitor and GI blood loss with hemoglobin approximately 7 g% requiring transfusion when she is at this level.  She was transfused 12/9/2022 she continues on home dialysis 5 days/week x 2 hours and is also followed by cardiology with CHF and aortic stenosis but without plans for TAVR considering her her life expectancy.  Please note, additionally, she has peripheral neuropathy in bilateral lower extremities and left upper, nausea, issues with depression, left perinephric stranding secondary malignancy and hydronephrosis stable, right thyroid nodules stable, previous issues with hypocalcemia and was hospitalized in late January 2022 secondary to COVID.    She presented to the ER 1/3/2022 with generalized weakness over the last several weeks, potential urinary tract infection and underwent a number of studies demonstrating abnormal urinalysis, culture pending, blood cultures  pending, BUN/creatinine of 27 and 5.29, calcium 7.8, H&H of 7.0 and 20.7.  Patient given ceftriaxone in emergency room.    Interval history:    1/4/2023  T98.1, pulse 60, respirations 18, /67, weight 368 pounds, H&H 7.0 and 20.7, platelet count of 90,000  Patient is seen in observation unit and we have discussed her issues to date including her PET/CT findings which are improved for bony findings with an artifact in the right chest that would be consistent with her previous radiation site in artifactual uptake.  The patient and her  are frustrated about whether TAVR recommendations have been completed with her symptoms currently exacerbated by her significant anemia.    Past Medical   Past Medical History:   Diagnosis Date   • Anemia    • Anxiety and depression    • Bicuspid aortic valve    • Breast cancer (HCC) 2004    RIGHT, HAD NEELA. MASTECTOMY, CHEMO AND RADIATION   • CHF (congestive heart failure) (HCC)    • CKD (chronic kidney disease)    • Diabetes mellitus (HCC)    • Dialysis patient (HCC)     TUES AND SATURDAY [facility]   • Elevated cholesterol    • Frequent UTI    • Heart murmur    • History of kidney stones    • History of MRSA infection 2005    POST BREAST SURGERY-TREATED [facility]   • History of sepsis 2010    KIDNEY STONE   • History of transfusion    • Hyperlipidemia    • Hypertension    • Hyperthyroidism    • Hypothyroidism    • Kidney stone     CURRENT LEFT-DEC 2021   • Lymphedema of leg     LEFT   • Metastasis from breast cancer (HCC)     STOMACH-OMENTUM   • Neuromuscular disorder (HCC)    • Obesity    • ANDREW on CPAP     CPAP   • Osteoarthrosis    • Peripheral neuropathy     FEET BILAT   • Radiculopathy    • Rectal bleeding 8/16/2022   • Thickened endometrium    • Weakness     BILAT LEGS-WITH ANY AMT OF TIME     Social History   Social History     Socioeconomic History   • Marital status:      Spouse name: Rk   Tobacco Use   • Smoking status: Never   • Smokeless tobacco: Never    Vaping Use   • Vaping Use: Never used   Substance and Sexual Activity   • Alcohol use: No   • Drug use: No   • Sexual activity: Yes     Partners: Male     Birth control/protection: Post-menopausal     Family History  Family History   Problem Relation Age of Onset   • Heart attack Mother 72   • Coronary artery disease Mother         Mother  from MI at 72   • Diabetes Mother    • Breast cancer Mother    • Hyperlipidemia Mother    • Arthritis Mother    • Cancer Mother    • Heart attack Father 74   • Aortic aneurysm Father         Father with ruptured thoracic aortic aneurysm   • Coronary artery disease Father         Father  from MI at 74   • Heart disease Father    • Hyperlipidemia Father    • Arthritis Father    • Heart attack Maternal Grandmother 76   • Coronary artery disease Maternal Grandmother         MGM deceeased from MI at age 76   • Malig Hyperthermia Neg Hx      Medications    Current Facility-Administered Medications:   •  acetaminophen (TYLENOL) tablet 650 mg, 650 mg, Oral, Q4H PRN **OR** acetaminophen (TYLENOL) 160 MG/5ML solution 650 mg, 650 mg, Oral, Q4H PRN **OR** acetaminophen (TYLENOL) suppository 650 mg, 650 mg, Rectal, Q4H PRN, Annabelle Keller APRN  •  Armodafinil 250 mg, 250 mg, Oral, Daily, Lorena Harden MD  •  aspirin EC tablet 81 mg, 81 mg, Oral, Daily, Lorena Harden MD, 81 mg at 23 0858  •  atorvastatin (LIPITOR) tablet 40 mg, 40 mg, Oral, Nightly, Lorena Harden MD  •  cefTRIAXone (ROCEPHIN) 1 g in sodium chloride 0.9 % 100 mL IVPB-VTB, 1 g, Intravenous, Q24H, Annabelle Keller APRN  •  dextrose (D50W) (25 g/50 mL) IV injection 25 g, 25 g, Intravenous, Q15 Min PRN, Annabelle Keller APRN  •  dextrose (GLUTOSE) oral gel 15 g, 15 g, Oral, Q15 Min PRN, Annabelle Keller APRN  •  folic acid (FOLVITE) tablet 1,000 mcg, 1,000 mcg, Oral, Daily, Lorena Harden MD, 1,000 mcg at 23 0858  •  gabapentin (NEURONTIN) capsule 300 mg, 300 mg,  Oral, Nightly, Lorena Harden MD  •  glucagon (human recombinant) (GLUCAGEN DIAGNOSTIC) injection 1 mg, 1 mg, Intramuscular, Q15 Min PRN, Annabelle Keller APRN  •  insulin glargine (LANTUS, SEMGLEE) injection 25 Units, 25 Units, Subcutaneous, QAM, Lorena Harden MD, 25 Units at 01/04/23 0858  •  insulin lispro (ADMELOG) injection 0-7 Units, 0-7 Units, Subcutaneous, TID AC, Annabelle Keller APRN, 2 Units at 01/04/23 0859  •  levothyroxine (SYNTHROID, LEVOTHROID) tablet 50 mcg, 50 mcg, Oral, Daily, Lorena Harden MD, 50 mcg at 01/04/23 0858  •  nitroglycerin (NITROSTAT) SL tablet 0.4 mg, 0.4 mg, Sublingual, Q5 Min PRN, Annabelle Keller APRN  •  ondansetron (ZOFRAN) injection 4 mg, 4 mg, Intravenous, Q6H PRN, Annabelle Keller APRN  •  sertraline (ZOLOFT) tablet 150 mg, 150 mg, Oral, Daily, Lorena Harden MD, 150 mg at 01/04/23 0900  •  sodium chloride 0.9 % flush 10 mL, 10 mL, Intravenous, PRN, Shine Sanchez MD  •  sodium chloride 0.9 % flush 10 mL, 10 mL, Intravenous, Q12H, Annabelle Keller APRN, 10 mL at 01/04/23 0859  •  sodium chloride 0.9 % flush 10 mL, 10 mL, Intravenous, PRN, Annabelle Keller APRN  •  sodium chloride 0.9 % infusion 40 mL, 40 mL, Intravenous, PRN, Annabelle Keller APRN  •  vitamin B-12 (CYANOCOBALAMIN) tablet 1,000 mcg, 1,000 mcg, Oral, Daily, Lorena Harden MD, 1,000 mcg at 01/04/23 0900  Allergies  No Known Allergies  Review of Systems  Fourteen systems have been reviewed with the patient and are positive per HPI only.     Objective:     Vitals:    01/04/23 0838   BP: 103/57   Pulse: 59   Resp: 18   Temp:    SpO2: 100%     GENERAL:  Well-developed, well-nourished, morbidly obese in no acute distress.   SKIN:  Warm, dry without rashes, purpura or petechiae.  HEAD:  Normocephalic.  EYES:  Pupils equal, round and reactive to light.  EOMs intact.  Conjunctivae normal.  EARS:  Hearing intact.  NOSE:  Septum midline.  No excoriations or nasal  discharge.  MOUTH:  Tongue is well-papillated; no stomatitis or ulcers.  Lips normal.  THROAT:  Oropharynx without lesions or exudates.  NECK:  Supple with good range of motion; no thyromegaly or masses, no JVD.  LYMPHATICS:  No cervical, supraclavicular, axillary or inguinal adenopathy.  CHEST: Radiation dermatitis right upper anterior chest wall, lungs clear to percussion and auscultation. Good airflow.  CARDIAC:  Regular rate and rhythm with 3/6 holosystolic murmur, rubs or gallops. Normal S1,S2.  ABDOMEN:  Soft, nontender with no organomegaly or masses.  EXTREMITIES:  No clubbing, cyanosis but lymphedema present lower extremities associated with her obesity.  NEUROLOGICAL:  Cranial Nerves II-XII grossly intact.  No focal neurological deficits.  PSYCHIATRIC:  Normal affect and mood.        DIAGNOSTIC DATA:  Results from last 7 days   Lab Units 01/04/23  0650 01/03/23  2317   WBC 10*3/mm3 3.84 4.61   HEMOGLOBIN g/dL 7.0* 7.8*   HEMATOCRIT % 20.7* 24.0*   PLATELETS 10*3/mm3 90* 98*   MONOCYTES % % 3.1*  --      Results from last 7 days   Lab Units 01/04/23  0650 01/03/23  2317   SODIUM mmol/L 135* 134*   POTASSIUM mmol/L 2.8* 3.0*   CHLORIDE mmol/L 94* 97*   CO2 mmol/L 27.8 29.0   BUN mg/dL 27* 25*   CREATININE mg/dL 5.29* 4.98*   CALCIUM mg/dL 7.8* 8.3*   BILIRUBIN mg/dL  --  0.6   ALK PHOS U/L  --  80   ALT (SGPT) U/L  --  6   AST (SGOT) U/L  --  12   GLUCOSE mg/dL 194* 201*       IMAGING:        Assessment/Plan:   This is a 62 y.o. female with:    *Metastatic lobular breast cancer (ER/ND positive, HER-2/tj negative):  • Previous stage IIIC right breast cancer in 2003, received neoadjuvant chemotherapy (unknown regimen) with subsequent bilateral mastectomies 1/22/2004 with right axillary dissection.  Residual 10-12 cm invasive lobular carcinoma on the right breast with 14/16+ nodes with extranodal extension.  Received adjuvant carboplatin and Navelbine chemotherapy x4 cycles  • Attempted adjuvant radiation to  the chest wall however interrupted due to cellulitis that required removal of implants in August 2004  • Received tamoxifen from 6/22/2004 until June 2009.  Transitioned to Femara and received until June 2014  • Identification of CT findings concerning for carcinomatosis on CT scans and June 2019.  • PET scan confirmed hypermetabolic activity in the omentum as well as small retroperitoneal lymph nodes.  • CT-guided omental biopsy 7/30/2019 with metastatic lobular carcinoma of breast primary, ER positive (greater than 95%), VT positive (90%), HER-2/tj negative (1+ IHC)  • [facility] liquid biopsy 2/5/2020: MSI undetermined, mutations in BETH, NF1, CHEK2.  No evidence of PIK3CA mutation.  • Subsequent [facility] germline testing 11/12/2021 with BETH VUS (c.2864C>A) and POLE VUS (c.631A>T)  • Initiation of Aromasin and Ibrance in August 2019  • Difficulty with myelosuppression related to Ibrance requiring dose alterations, eventually changed to 100 mg for 7 days on followed by 7 days off.  • CT chest abdomen pelvis 10/26/2021 with increase in left hydronephrosis with increase in left perinephric and periureteral stranding. Decrease in stone in the left kidney lower pole (7 mm).  Stable small retroperitoneal lymph and left periaortic lymph nodes.  • PET scan 11/10/2021 with multiple bilateral hypermetabolic nodular pulmonary lesions, asymmetric moderate to severe left hydronephrosis with ill-defined fat stranding and soft tissue thickening in the renal sinus and surrounding the left ureter, hypermetabolic left retroperitoneal lymph node with slight increase in size, ill-defined hypermetabolic thickening in the inferior omentum with increase in size, uptake and C7 (indeterminate, recommended MRI).  Short segments of uptake in the mid and distal esophagus possibly related to gastritis.  • PET scan findings felt to be consistent with progressive malignancy.  Patient declined repeat omental biopsy.   • Options for  further treatment discussed on 11/12/2021.  In light of renal failure and dialysis, options are more limited.  Recommendation to continue Ibrance and discontinue Aromasin, begin Faslodex.  Discussed possible future use of single agent Taxol.    • Aromasin discontinued 11/12/2021  • On 11/22/2021 initiation of Faslodex with continuation of Ibrance (100 mg daily for 7 days on followed by 7 days off).  • MRI cervical spine 11/18/2021 showed the right C7 normality to likely represent metastatic disease.  With solitary bone lesion, did not recommend pursuit of bone modifying agent.  • Following 2 cycles Faslodex/Ibrance, PET scan on 1/11/2022 showed no change in the soft tissue superior to the dome of the bladder with hypermetabolic activity (likely related to carcinomatosis).  Significant decrease in injection of fat around the left renal pelvis and stable retroperitoneal hypermetabolic lymph nodes, decrease in size and activity in small bilateral pulmonary nodules.  Findings felt to represent evidence of response to treatment.    • Ibrance held briefly beginning 1/28/2022 due to hospitalization with COVID-19 infection and C. difficile colitis.  Patient developed leukopenia/neutropenia.  Ibrance resumed at previous dosing (100 mg daily 7 days on followed by 7 days off) on 2/9/22.  • Following 5 cycles Faslodex/Ibrance PET scan 4/19/2022 with evidence of mixed response.  New hypermetabolic lesion spinous process L2 (SUV 5.1) and slight increase in activity left clavicular metastasis (SUV increased 4.6-8.1).  C7 hypermetabolic mixed lytic and blastic bone lesion was unchanged.  Decrease in uptake involving omental thickening.  Stable soft tissue thickening around the left renal pelvis and ureter.  Discussion again regarding potential pursuit of IV chemotherapy with Taxol versus continuation of Ibrance and Faslodex with radiation to sites of bony disease at L2, left clavicle, C7.  Patient opted to defer initiation of  systemic chemotherapy and continue Ibrance/Faslodex with consideration of radiation.  • Initiated monthly Xgeva on 5/19/2022 (cleared with nephrology).  Xgeva subsequently held due to significant hypocalcemia.  Decision made to forego further Xgeva with persistent hypocalcemia.  • Palliative radiation received to lumbar spine regarding L2 metastasis 5/23- 6/7/2022  • Ibrance held during radiation therapy beginning 5/22/2022.  Ibrance resumed 6/16/2022.  • PET scan 7/21/2022 with stable hypermetabolic abdominal pelvic lymph nodes and subcarinal lymph node, stable bone lesions at C7, left clavicle.  Stable soft tissue thickening around the left renal pelvis with left hydronephrosis.  Uptake in adrenal glands felt to be likely reactive (no change in size).  No activity noted at L2 (previously radiated).  New hypermetabolic lesion right anterior sixth rib.  • With evidence of further slight progression in bone on PET scan (new right sixth rib lesion), on 7/28/2022 discontinued Ibrance and plan to transition to abemaciclib with continuation of Faslodex.    • On 8/4/2022 initiated abemaciclib at reduced dose of 50 mg twice daily.  • On 8/25/2022, increased abemaciclib dose to 100 mg twice daily  • PET scan 10/6/2022 with stable findings with exception of left acetabular activity report noted new activity however on prior PET scan question of focal activity present previously.  No corresponding CT abnormality and significant increase in SUV at sacral lesion.  Scan otherwise stable.  Decision to continue current treatment  • Abemaciclib dose increased on 10/13/2022 up to 150 mg twice daily  • Patient returns today in follow-up due for cycle 14 Faslodex 500 mg IM every 4 weeks and continuing on abemaciclib 150 mg twice daily that was initiated 8/22 at 50 mg daily with dose increase on 8/25/2022 to 100 mg twice daily, and further increase to 150 mg twice daily on 10/13/2022.  Patient is tolerating the dose increase well with  no overt changes in her stool patterns.  She is currently having significant more fatigue with dropping hemoglobin as further discussed below.  We will proceed with transfusion.  Otherwise she will undergo repeat PET scan in 3 weeks and then follow-up with Dr. Irvin in 4 weeks for scan review and cycle 15 Faslodex pending scan results  • Subsequent PET/CT with masslike area overlying the right axilla?  Injection site, reduction in avid bony lesions left acetabulum, left distal clavicle, and C7 vertebral body, no additional other findings compared to previous     *Anemia secondary to ESRD, underlying malignancy/chronic disease, myelosuppression from CDK 4/6 inhibitor, GI blood loss:  • Anemia secondary to ESRD, malignancy with exacerbation by use of Ibrance  • Previous evidence of folate deficiency 8/12/2019 with level 3.84, initiated folic acid 1 mg daily  • Previous evidence of iron deficiency, received Injectafer on 8/7/2020 750 mg IV x1 dose.  Second dose not administered due to onset of fever, subsequent iron studies precluded further administration of IV iron.  • Previous low normal B12 level initiated oral B12 1000 mcg daily.  • Patient had previously received Procrit and required intermittent transfusion support  • Following initiation of hemodialysis, management of BEAU transitioned to nephrology, receiving Epogen with dialysis.  • Ongoing transfusion support prompted alteration in Ibrance dosing on 8/23/2021.  • After alteration in Ibrance dosing, hemoglobin improved and remained stable in the 9-10 range.  • Improved but ongoing intermittent need for transfusion support despite Ibrance dose alteration  • Stool negative for occult blood x3 on 11/2/2021  • Patient with reduced dialysis frequency to 2 days/week with concurrent decrease in Epogen dosing corresponding again to increased transfusion requirement.  • Epogen dose increased per nephrology to 20,000 units twice weekly with dialysis on Mondays and  Fridays  • Ibrance again exacerbating anemia with ongoing intermittent transfusion requirements.  Ibrance subsequently discontinued on 7/28/2022 and patient initiated abemaciclib on 8/4/2022 which may be less myelosuppressive.  • Additional transfusions required with hemoglobin on 3/31/2022 6.6, hemoglobin 4/21/2022 of 6.4, hemoglobin on 5/5/2022 of 6.8, hemoglobin 6.4 on 7/14/2022, hemoglobin 6.4 on 7/28/2022, hemoglobin 6.8 on 8/18/2022.  • Patient did develop transient visible blood in stool in July 2022  • Anemia evaluation 7/28/2022 with iron 32, ferritin 412, iron saturation 15%, TIBC 210, folate 19.9, B12 925, haptoglobin 300, .  • Stool positive for occult blood x3 on 8/1/2022  • Patient was seen by GI on 8/16/2022, felt to be high risk for endoscopic evaluation and elected to forego EGD/colonoscopy for now  • Patient transitioned from hemodialysis in clinic to home hemodialysis 5 days/week x 2 hours beginning 8/29/2022.  With transition to home dialysis, nephrology transition the patient from Epogen to Mircera administered every 4 weeks in their office, initiated 8/22/2022.  • Patient currently continues Mircera every 4 weeks.  She did need a blood transfusion after hemoglobin on 10/27/2022 was 6.6.  • Hemoglobin today 7.1.  Patient is symptomatic with extreme fatigue today, requesting to go ahead with transfusion.  • Patient seen in observation 1/4/2022 after admission with weakness, hemoglobin 7.0, medic at 20.7, 2 unit transfusion anticipated     *ESRD on HD:  • Patient with previous CKD3/4  • Hospitalization 4/29-5/4/2021 with RYAN/CKD, UTI, anemia.  Required initiation of hemodialysis Tuesday Thursday Saturday.   • Decrease in frequency of dialysis to twice per week.  She continues to produce a significant amount of urine.   • Status post left upper extremity AV fistula placement on 11/3/2021 by vascular surgery  • Attempt to hold further dialysis on 1/11/2022 with close monitoring of her  laboratory and clinical parameters.  Dialysis quickly resumed due to development of hyperkalemia.  • Patient was undergoing dialysis 2 days/week on Mondays and Fridays.    • On 8/22/2022 patient transitioned to use of home dialysis device 5 days/week x 2 hours.     *CHF with severe aortic stenosis:  • Echocardiogram 7/12/2019 showing ejection fraction 48% with moderate dilation of the LV cavity, global hypokinesis, grade 2 diastolic dysfunction, mild to moderate aortic stenosis, trivial pericardial effusion.  • Echocardiogram 7/19/2021 with ejection fraction 56%, left atrial volume moderately increased, grade 2 diastolic dysfunction, severe calcification aortic valve, moderate aortic stenosis, severe mitral calcification, mild mitral stenosis, marked elevation in RVSP from tricuspid regurgitation.  • Echocardiogram on 7/13/2022 with ejection fraction 56-60%, grade 2 diastolic dysfunction, severe aortic stenosis.    • Cardiac catheterization 8/23/2022 showed normal coronary arteries, mild to moderate pulmonary hypertension.    • She was evaluated by cardiothoracic surgery Dr. Pagan and there was discussion regarding potential candidacy for TAVR although there was concern given her underlying comorbidities including her metastatic breast cancer.  I discussed patient's prognosis with Dr. Pagan.  She does have opportunities for additional treatment of her malignancy with intravenous chemotherapy and is willing to pursue this in the future when needed.  It is unclear as to her expected survival however given her multiple severe comorbidities with metastatic breast cancer, ESRD on dialysis, severe aortic stenosis, severe anemia.  There is consideration of possible pursuit of balloon valvuloplasty initially to assess her symptomatic response and then consider pursuit of TAVR in the future.   • Patient is awaiting further decision from cardiology as to recommendations for further management.  She was seen on 10/25/2022 but  reports she was told that her life expectancy is not long enough for procedure.  She will follow-up in January 2023 and discussed once again.  • Plan to request this assessment during patient's observation stay     *Left upper and bilateral lower extremity peripheral neuropathy:  • Patient reports that left upper extremity neuropathy was not present from previous chemotherapy but occurred after hospitalization that required intubation and critical care stay.  Apparently felt to represent critical care neuropathy.  Improved over time.  • Bilateral lower extremity neuropathy felt to be related to diabetes  • May have future implications regarding treatment for breast cancer.  • Patient reports that peripheral neuropathy symptoms continue in the bilateral lower extremities, unchanged.  This will be a consideration with potential future use of Taxol     *Uterine/endometrial activity on PET scan:  • Patient experienced postmenopausal vaginal bleeding in 2013, negative endometrial biopsy and gynecologic evaluation.  • With findings on PET scan with enlarging uterus and some hypermetabolic activity, referred back to Dr. Oliveros in gynecology.    • 9/19/2019 D&C with hysteroscopy by Dr. Oliveros, no significant intraoperative findings, pathologic results with benign findings, no evidence of malignancy.     *Nausea:  • Mild, relieved with Zofran.   • Patient experienced improvement in nausea after initiating hemodialysis  • No recent nausea     *Myelosuppression secondary to CDK 4/6 inhibitor:  • Patient was able to maintain adequate WBC after dosing alteration on Ibrance to treatment at 100 mg daily for 7 days on followed by 7 days off.  • Subsequent transition from Ibrance to abemaciclib  • Today, WBC 2.97.  Stable  • Assessment 1/4/2022 white count of 3840     *Depression  • Patient with history of depression, worsened after the death of her son in November 2021  • With evidence of progressive malignancy in November 2021,  patient agreeable to referral to supportive oncology  • Patient currently receiving gabapentin 300 mg nightly, Zoloft 150 mg daily, armodafinil 250 mg daily  • Patient does continue with difficulties regarding depression that wax and wane.  Currently symptoms under fair control.  Patient last seen by supportive oncology on 9/26/2022.     *Left perinephric stranding secondary to malignancy with hydronephrosis:  • CT 4/22/2021 showed interval enlargement of 2 staghorn calculi in the left kidney with persistent left perinephric stranding  • Hospitalization 4/29-5/4/2021 with RYAN/CKD, UTI, anemia.  Required 2 unit PRBC transfusion and initiated hemodialysis Tuesday Thursday Saturday.    • Discussion from urology regarding potential need for surgical procedure to address staghorn calculus left kidney  • CT scan 7/2/2021 with only 1 remaining left renal stone identified which had decreased in size.  Patient appears to have passed the other stone.  • As above, CT 10/26/2021 with more prominent left hydronephrosis and increase in left perinephric and periureteral stranding.  Felt to possibly be related to passage of recent stone versus partial obstruction secondary to debris or thrombus in the left ureter versus pyelonephritis.  Patient however with no clinical evidence of recent stone passage nor pyelonephritis. Malignancy felt to be the cause of CT changes around the left kidney at this point.   • Left ureteral stent replacement 12/16/2021  • PET scan 1/11/2022 with decrease in fat injection near left renal pelvis, stent in satisfactory position.  Mild residual hydronephrosis on the left.  • Ureteral stent replaced on 3/10/2022  • PET scan 4/19/2022 with no hydronephrosis, left ureteral stent in place  • PET scan 7/21/2022 with persistent left hydronephrosis, ureteral stent in place  • PET scan 10/6/2022 with left nephroureteral stent in place, overall stable findings  • UTI documented 1/3/2023, empiric Rocephin  given     *Right thyroid nodules  • Patient underwent prior thyroid biopsy by her report with benign findings many years ago, records not available  • On MRI cervical spine 11/18/2021, finding of 1.8 cm right thyroid nodule  • Thyroid ultrasound 11/26/2021 with right-sided thyroid nodules, 1 nodule was hypoechoic, solid measuring 2 cm with biopsy recommended.  • Thyroid ultrasound results reviewed with patient.  In light of her metastatic breast cancer, renal failure the significance of the thyroid nodule is felt to be much less.  We discussed possibility of thyroid biopsy however patient is inclined to forego this, especially since she has had a biopsy performed in the past with benign findings by her report.    • No hypermetabolic activity identified on PET scan 1/11/2022 in the neck     *Hospitalization 1/28-1/30/2022 due to COVID-19 infection and C. difficile colitis  • Patient fully vaccinated with 3 doses Moderna COVID-19 vaccine  • Patient developed symptoms 1/26/2022 with abrupt onset sinus congestion, mild cough, subsequently developed nausea and diarrhea on 1/27/2022.  Significant fatigue.  • Patient tested positive in emergency department on 1/28/2022 and was admitted  • Patient maintained O2 saturation in mid 90% range on room air  • Patient received remdesivir  • Patient was also found to be positive for C. difficile colitis, received course of oral vancomycin.  • Symptoms from both COVID-19 and C. difficile colitis ultimately resolved.     *Hypocalcemia  • Patient developed significant hypocalcemia after receiving Xgeva on 5/19/2022  • Calcium management per nephrology  • No further Xgeva planned due to persistent significant hypocalcemia  • Calcium today 7.8         Tarik Diaz MD

## 2023-01-04 NOTE — H&P
"    Patient Name:  Juana Hall  YOB: 1960  MRN:  [id number]  Admit Date:  1/3/2023  Patient Care Team:  Kiana Gramajo APRN as PCP - General (Family Medicine)  Pasha Harkisn MD as Referring Physician (Cardiology)  Leodan Irvin Jr., MD as Consulting Physician (Hematology and Oncology)  Tai Antonio MD as Consulting Physician (Nephrology)      Subjective   History Present Illness     Chief Complaint   Patient presents with   • Weakness - Generalized   • Blood in Urine   • Vomiting       Ms. Hall is a 62 y.o. non-smoker with a history of end-stage renal disease on hemodialysis, anemia of chronic disease/underlying malignancy, type 2 diabetes, combined systolic/diastolic CHF, metastatic breast cancer that presents to [facility] complaining of generalized malaise, dysuria and hematuria.    History of Present Illness  62-year-old pleasant female who reports an approximate 6-day history generalized weakness/malaise.  She reports she started to feel bad on Friday, on Monday morning she felt she could \"barely move\" and after hemodialysis on Monday she physically was unable to stand up by herself.  On Sunday she was instructed to perform a 24-hour urine collection for nephrology and that is when she and her  noticed that she was having blood in her urine.  The patient reports that she is not sure how long she has been having blood in her urine as she \"does not look\" at her urine and thinks it could have been for a while.  She reports dysuria/a painful pressure-like feeling starting before Friday but is unable to clearly tell me when.  She also reports new abdominal pain, shortness of breath on exertion and intermittent nausea with vomiting starting this weekend.    Review of Systems   Constitutional: Positive for activity change and fatigue. Negative for fever.   Respiratory: Positive for shortness of breath. Negative for chest tightness and wheezing.  "   Cardiovascular: Negative for chest pain.   Gastrointestinal: Positive for abdominal pain, nausea and vomiting.   Genitourinary: Positive for dysuria, frequency and hematuria.   Musculoskeletal: Positive for myalgias.   Neurological: Positive for weakness and numbness (chronic neuropathy). Negative for syncope.   Psychiatric/Behavioral: Negative for confusion.        Personal History     Past Medical History:   Diagnosis Date   • Anemia    • Anxiety and depression    • Bicuspid aortic valve    • Breast cancer (HCC) 2004    RIGHT, HAD NEELA. MASTECTOMY, CHEMO AND RADIATION   • CHF (congestive heart failure) (HCC)    • CKD (chronic kidney disease)    • Diabetes mellitus (HCC)    • Dialysis patient (HCC)     TUES AND SATURDAY [facility]   • Elevated cholesterol    • Frequent UTI    • Heart murmur    • History of kidney stones    • History of MRSA infection 2005    POST BREAST SURGERY-TREATED [facility]   • History of sepsis 2010    KIDNEY STONE   • History of transfusion    • Hyperlipidemia    • Hypertension    • Hyperthyroidism    • Hypothyroidism    • Kidney stone     CURRENT LEFT-DEC 2021   • Lymphedema of leg     LEFT   • Metastasis from breast cancer (HCC)     STOMACH-OMENTUM   • Neuromuscular disorder (HCC)    • Obesity    • ANDREW on CPAP     CPAP   • Osteoarthrosis    • Peripheral neuropathy     FEET BILAT   • Radiculopathy    • Rectal bleeding 8/16/2022   • Thickened endometrium    • Weakness     BILAT LEGS-WITH ANY AMT OF TIME     Past Surgical History:   Procedure Laterality Date   • ARTERIOVENOUS FISTULA/SHUNT SURGERY Left 11/03/2021    Procedure: LEFT  BRACHIOCEPALIC ARTERIOVENOUS FISTULA;  Surgeon: Dejuan Adler MD;  Location: [facility];  Service: Vascular;  Laterality: Left;   • BREAST RECONSTRUCTION  2004    WITH IMPLANTS, AND REVISION, NOW REMOVED   • BREAST SURGERY  2004   • CARDIAC CATHETERIZATION N/A 8/23/2022    Procedure: CORONARY ANGIOGRAPHY;  Surgeon: Luis Rivers MD;  Location:   [facility];  Service: Cardiology;  Laterality: N/A;   • CARDIAC CATHETERIZATION N/A 2022    Procedure: Right Heart Cath;  Surgeon: Luis Rivers MD;  Location:  [facility];  Service: Cardiology;  Laterality: N/A;   • CATARACT EXTRACTION WITH INTRAOCULAR LENS IMPLANT Bilateral    •  SECTION  1987   •  SECTION  1987   • CYSTOSCOPY W/ URETERAL STENT PLACEMENT     • CYSTOSCOPY W/ URETERAL STENT PLACEMENT Left 2021    Procedure: CYSTOSCOPY KEFT RETROGRADE PYELOGRAM  LEFT  URETERAL STENT PLACEMENT;  Surgeon: Leodan Mckee Jr., MD;  Location: [facility] MAIN OR;  Service: Urology;  Laterality: Left;   • CYSTOSCOPY W/ URETERAL STENT PLACEMENT Left 03/10/2022    Procedure: CYSTOSCOPY AND LEFT URETERAL STENT EXCHANGE, RETROGRADE PYELOGRAM;  Surgeon: Leodan Mckee Jr., MD;  Location: [facility] MAIN OR;  Service: Urology;  Laterality: Left;   • D & C HYSTEROSCOPY N/A 2017    Procedure: DILATATION AND CURETTAGE, HYSTEROSCOPY ;  Surgeon: Cherie Oliveros MD;  [place] [facility] [place] [facility];  Service:    • D & C HYSTEROSCOPY N/A 2019    Procedure: DILATATION AND CURETTAGE HYSTEROSCOPY/POLYPECTOMY;  Surgeon: Cherie Oliveros MD;  Location: [facility] [place] [facility];  Service: Gynecology   • INSERTION AND REMOVAL HEMODIALYSIS CATHETER Right 2021   • INSERTION HEMODIALYSIS CATHETER Right 2021    Procedure: HEMODIALYSIS CATHETER INSERTION;  Surgeon: Clint Hernández MD;  Location: [facility] MAIN OR;  Service: Vascular;  Laterality: Right;   • LYMPH NODE BIOPSY     • MASTECTOMY Bilateral      Family History   Problem Relation Age of Onset   • Heart attack Mother 72   • Coronary artery disease Mother         Mother  from MI at 72   • Diabetes Mother    • Breast cancer Mother    • Hyperlipidemia Mother    • Arthritis Mother    • Cancer Mother    • Heart attack Father 74   • Aortic aneurysm Father         Father with ruptured  thoracic aortic aneurysm   • Coronary artery disease Father         Father  from MI at 74   • Heart disease Father    • Hyperlipidemia Father    • Arthritis Father    • Heart attack Maternal Grandmother 76   • Coronary artery disease Maternal Grandmother         MGM deceeased from MI at age 76   • Malig Hyperthermia Neg Hx      Social History     Tobacco Use   • Smoking status: Never   • Smokeless tobacco: Never   Vaping Use   • Vaping Use: Never used   Substance Use Topics   • Alcohol use: No   • Drug use: No     No current facility-administered medications on file prior to encounter.     Current Outpatient Medications on File Prior to Encounter   Medication Sig Dispense Refill   • abemaciclib (VERZENIO) 150 mg tablet chemo tablet Take 1 tablet by mouth 2 (Two) Times a Day. 60 tablet 5   • acetaminophen (TYLENOL) 500 MG tablet Take 500 mg by mouth As Needed.     • Armodafinil 250 MG tablet Take 1 tablet by mouth Daily. 30 tablet 2   • aspirin 81 MG EC tablet Take 81 mg by mouth Daily.     • atorvastatin (LIPITOR) 40 MG tablet TAKE ONE TABLET BY MOUTH ONCE NIGHTLY 30 tablet 0   • carvedilol (COREG) 3.125 MG tablet TAKE ONE TABLET BY MOUTH TWICE A  tablet 1   • folic acid (FOLVITE) 1 MG tablet TAKE ONE TABLET BY MOUTH DAILY 30 tablet 2   • gabapentin (Neurontin) 300 MG capsule Take 1 capsule by mouth Every Night. 60 capsule 2   • Levemir FlexTouch 100 UNIT/ML injection Inject 50 Units under the skin into the appropriate area as directed Daily. 30 mL 0   • levothyroxine (SYNTHROID, LEVOTHROID) 50 MCG tablet Take 1 tablet by mouth Daily. 90 tablet 1   • mupirocin (BACTROBAN) 2 % ointment      • ondansetron (Zofran) 8 MG tablet Take 1 tablet by mouth Every 8 (Eight) Hours As Needed for Nausea or Vomiting. 30 tablet 2   • sertraline (ZOLOFT) 100 MG tablet Take 1.5 tablets by mouth Daily. 135 tablet 0   • vitamin B-12 (CYANOCOBALAMIN) 1000 MCG tablet Take 1,000 mcg by mouth Daily.     • NON FORMULARY Take 5  mg by mouth 2 (Two) Times a Day. Versinio     • NON FORMULARY Inject 1 dose under the skin into the appropriate area as directed Every 30 (Thirty) Days. facidex       No Known Allergies    Objective    Objective     Vital Signs  Temp:  [98.1 °F (36.7 °C)-100.2 °F (37.9 °C)] 98.3 °F (36.8 °C)  Heart Rate:  [54-84] 54  Resp:  [16-18] 18  BP: ()/(34-70) 104/55  SpO2:  [88 %-100 %] 100 %  on  Flow (L/min):  [2-3] 2;   Device (Oxygen Therapy): nasal cannula  Body mass index is 57.75 kg/m².    Physical Exam  Constitutional:       General: She is not in acute distress.     Appearance: She is obese. She is ill-appearing.   HENT:      Head: Normocephalic and atraumatic.      Mouth/Throat:      Mouth: Mucous membranes are moist.      Pharynx: Oropharynx is clear.   Eyes:      Extraocular Movements: Extraocular movements intact.      Conjunctiva/sclera: Conjunctivae normal.      Pupils: Pupils are equal, round, and reactive to light.   Cardiovascular:      Rate and Rhythm: Bradycardia present.   Pulmonary:      Effort: Pulmonary effort is normal.      Breath sounds: Normal breath sounds. No wheezing.   Abdominal:      General: There is no distension.      Palpations: Abdomen is soft.      Tenderness: There is abdominal tenderness (mild). There is no guarding.   Musculoskeletal:      Cervical back: Normal range of motion and neck supple.      Right lower leg: Edema present.      Left lower leg: Edema present.      Comments: KAMLESHE AVANITA   Skin:     General: Skin is warm and dry.      Coloration: Skin is pale.   Neurological:      General: No focal deficit present.      Mental Status: She is alert and oriented to person, place, and time. Mental status is at baseline.   Psychiatric:         Mood and Affect: Mood normal.         Behavior: Behavior normal.         Thought Content: Thought content normal.         Judgment: Judgment normal.         Results Review:  I reviewed the patient's new clinical results.  I reviewed the  patient's new imaging results and agree with the interpretation.  I reviewed the patient's other test results and agree with the interpretation  I personally viewed and interpreted the patient's EKG/Telemetry data    Lab Results (last 24 hours)     Procedure Component Value Units Date/Time    CBC & Differential [[id number]]  (Abnormal) Collected: 01/03/23 2317    Specimen: Blood Updated: 01/04/23 0103    Narrative:      The following orders were created for panel order CBC & Differential.  Procedure                               Abnormality         Status                     ---------                               -----------         ------                     CBC Auto Differential[[id number]]        Abnormal            Final result                 Please view results for these tests on the individual orders.    Comprehensive Metabolic Panel [[id number]]  (Abnormal) Collected: 01/03/23 2317    Specimen: Blood Updated: 01/04/23 0103     Glucose 201 mg/dL      BUN 25 mg/dL      Creatinine 4.98 mg/dL      Sodium 134 mmol/L      Potassium 3.0 mmol/L      Chloride 97 mmol/L      CO2 29.0 mmol/L      Calcium 8.3 mg/dL      Total Protein 6.7 g/dL      Albumin 2.7 g/dL      ALT (SGPT) 6 U/L      AST (SGOT) 12 U/L      Alkaline Phosphatase 80 U/L      Total Bilirubin 0.6 mg/dL      Globulin 4.0 gm/dL      A/G Ratio 0.7 g/dL      BUN/Creatinine Ratio 5.0     Anion Gap 8.0 mmol/L      eGFR 9.2 mL/min/1.73      Comment: <15 Indicative of kidney failure       Narrative:      GFR Normal >60  Chronic Kidney Disease <60  Kidney Failure <15      Procalcitonin [[id number]]  (Abnormal) Collected: 01/03/23 2317    Specimen: Blood Updated: 01/04/23 0109     Procalcitonin 1.25 ng/mL     Narrative:      As a Marker for Sepsis (Non-Neonates):    1. <0.5 ng/mL represents a low risk of severe sepsis and/or septic shock.  2. >2 ng/mL represents a high risk of severe sepsis and/or septic shock.    As a Marker for Lower Respiratory Tract Infections  "that require antibiotic therapy:    PCT on Admission    Antibiotic Therapy       6-12 Hrs later    >0.5                Strongly Recommended  >0.25 - <0.5        Recommended   0.1 - 0.25          Discouraged              Remeasure/reassess PCT  <0.1                Strongly Discouraged     Remeasure/reassess PCT    As 28 day mortality risk marker: \"Change in Procalcitonin Result\" (>80% or <=80%) if Day 0 (or Day 1) and Day 4 values are available. Refer to http://www.[facility]-pct-calculator.com    Change in PCT <=80%  A decrease of PCT levels below or equal to 80% defines a positive change in PCT test result representing a higher risk for 28-day all-cause mortality of patients diagnosed with severe sepsis for septic shock.    Change in PCT >80%  A decrease of PCT levels of more than 80% defines a negative change in PCT result representing a lower risk for 28-day all-cause mortality of patients diagnosed with severe sepsis or septic shock.       CBC Auto Differential [[id number]]  (Abnormal) Collected: 01/03/23 2317    Specimen: Blood Updated: 01/04/23 0103     WBC 4.61 10*3/mm3      RBC 2.50 10*6/mm3      Hemoglobin 7.8 g/dL      Hematocrit 24.0 %      MCV 96.0 fL      MCH 31.2 pg      MCHC 32.5 g/dL      RDW 16.6 %      RDW-SD 57.7 fl      MPV 9.7 fL      Platelets 98 10*3/mm3      Neutrophil % 77.9 %      Lymphocyte % 10.8 %      Monocyte % 7.6 %      Eosinophil % 2.4 %      Basophil % 0.4 %      Immature Grans % 0.9 %      Neutrophils, Absolute 3.59 10*3/mm3      Lymphocytes, Absolute 0.50 10*3/mm3      Monocytes, Absolute 0.35 10*3/mm3      Eosinophils, Absolute 0.11 10*3/mm3      Basophils, Absolute 0.02 10*3/mm3      Immature Grans, Absolute 0.04 10*3/mm3      nRBC 0.0 /100 WBC     Magnesium [[id number]]  (Normal) Collected: 01/03/23 2317    Specimen: Blood Updated: 01/04/23 0140     Magnesium 2.0 mg/dL     COVID-19 and FLU A/B PCR - Swab, Nasopharynx [[id number]]  (Normal) Collected: 01/04/23 0050    Specimen: Swab " from Nasopharynx Updated: 01/04/23 0120     COVID19 Not Detected     Influenza A PCR Not Detected     Influenza B PCR Not Detected    Narrative:      Fact sheet for providers: https://www.fda.gov/media/429292/download    Fact sheet for patients: https://www.fda.gov/media/864780/download    Test performed by PCR.    Urinalysis With Culture If Indicated - Straight Cath [[id number]]  (Abnormal) Collected: 01/04/23 0051    Specimen: Urine from Straight Cath Updated: 01/04/23 0131     Color, UA Red     Comment: Any Substance that causes an abnormal urine color can alter the accuracy of the chemical reactions.        Appearance, UA Turbid     pH, UA 6.0     Specific Gravity, UA 1.019     Glucose, UA Negative     Ketones, UA Negative     Bilirubin, UA Negative     Comment: Results confirmed by alternate method           Blood, UA Large (3+)     Protein, UA >=300 mg/dL (3+)     Leuk Esterase, UA Large (3+)     Nitrite, UA Positive     Urobilinogen, UA 0.2 E.U./dL    Narrative:      In absence of clinical symptoms, the presence of pyuria, bacteria, and/or nitrites on the urinalysis result does not correlate with infection.    Urinalysis, Microscopic Only - Straight Cath [[id number]]  (Abnormal) Collected: 01/04/23 0051    Specimen: Urine from Straight Cath Updated: 01/04/23 0257     RBC, UA 13-20 /HPF      WBC, UA Too Numerous to Count /HPF      Bacteria, UA 4+ /HPF      Squamous Epithelial Cells, UA None Seen /HPF      Hyaline Casts, UA None Seen /LPF      WBC Clumps, UA Large/3+ /HPF      Methodology Manual Light Microscopy    Urine Culture - Urine, Straight Cath [[id number]] Collected: 01/04/23 0051    Specimen: Urine from Straight Cath Updated: 01/04/23 0257    Blood Culture - Blood, Hand, Right [[id number]] Collected: 01/04/23 0059    Specimen: Blood from Hand, Right Updated: 01/04/23 0114    Lactic Acid, Plasma [[id number]]  (Normal) Collected: 01/04/23 0059    Specimen: Blood Updated: 01/04/23 0139     Lactate 0.9 mmol/L      Blood Culture - Blood, Wrist, Right [[id number]] Collected: 01/04/23 0215    Specimen: Blood from Wrist, Right Updated: 01/04/23 0228    Basic Metabolic Panel [[id number]]  (Abnormal) Collected: 01/04/23 0650    Specimen: Blood Updated: 01/04/23 0728     Glucose 194 mg/dL      BUN 27 mg/dL      Creatinine 5.29 mg/dL      Sodium 135 mmol/L      Potassium 2.8 mmol/L      Chloride 94 mmol/L      CO2 27.8 mmol/L      Calcium 7.8 mg/dL      BUN/Creatinine Ratio 5.1     Anion Gap 13.2 mmol/L      eGFR 8.5 mL/min/1.73      Comment: <15 Indicative of kidney failure       Narrative:      GFR Normal >60  Chronic Kidney Disease <60  Kidney Failure <15      CBC Auto Differential [[id number]]  (Abnormal) Collected: 01/04/23 0650    Specimen: Blood Updated: 01/04/23 0846     WBC 3.84 10*3/mm3      RBC 2.15 10*6/mm3      Hemoglobin 7.0 g/dL      Hematocrit 20.7 %      MCV 96.3 fL      MCH 32.6 pg      MCHC 33.8 g/dL      RDW 15.9 %      RDW-SD 55.5 fl      MPV 9.8 fL      Platelets 90 10*3/mm3     Protime-INR [[id number]]  (Abnormal) Collected: 01/04/23 0650    Specimen: Blood Updated: 01/04/23 0720     Protime 16.4 Seconds      INR 1.31    Lactic Acid, Plasma [[id number]]  (Normal) Collected: 01/04/23 0650    Specimen: Blood Updated: 01/04/23 0723     Lactate 0.8 mmol/L     Manual Differential [[id number]]  (Abnormal) Collected: 01/04/23 0650    Specimen: Blood Updated: 01/04/23 0835     Neutrophil % 89.7 %      Lymphocyte % 5.2 %      Monocyte % 3.1 %      Eosinophil % 1.0 %      Basophil % 1.0 %      Neutrophils Absolute 3.44 10*3/mm3      Lymphocytes Absolute 0.20 10*3/mm3      Monocytes Absolute 0.12 10*3/mm3      Eosinophils Absolute 0.04 10*3/mm3      Basophils Absolute 0.04 10*3/mm3      RBC Morphology Normal     WBC Morphology Normal     Platelet Morphology Normal    POC Glucose Once [[id number]]  (Abnormal) Collected: 01/04/23 0754    Specimen: Blood Updated: 01/04/23 0756     Glucose 193 mg/dL      Comment: Meter:  [id number] : [id number] Michael Senior RN       POC Glucose Once [[id number]]  (Abnormal) Collected: 01/04/23 1114    Specimen: Blood Updated: 01/04/23 1118     Glucose 220 mg/dL      Comment: Meter: [id number] : [id number] Minar Lali CNA       POC Glucose Once [[id number]]  (Abnormal) Collected: 01/04/23 1632    Specimen: Blood Updated: 01/04/23 1634     Glucose 178 mg/dL      Comment: Meter: [id number] : [id number] Minar Lali CNA             Imaging Results (Last 24 Hours)     Procedure Component Value Units Date/Time    XR Chest 1 View [[id number]] Collected: 01/04/23 0235     Updated: 01/04/23 0235    Narrative:        Patient: AZAEL ALEXANDER  Time Out: 02:34  Exam(s): FILM CXR 1 VIEW     EXAM:    XR Chest, 1 View    CLINICAL HISTORY:     Reason for exam: Fever, shortness of breath.    TECHNIQUE:    Frontal view of the chest.    COMPARISON:  January 20 at 2022.    FINDINGS:    Lungs: Patchy opacities are present at the left lung base, probably   related to atelectasis, but an early infiltrate is not excluded.    Pleural space:  Unremarkable.  No pneumothorax.    Heart: Cardiomegaly is present, with mild pulmonary vascular congestion.    The possibility of early congestive heart failure is not excluded.    Mediastinum:  Unremarkable.    Bones joints: Degenerative disc disease present.    IMPRESSION:       Cardiomegaly is present with mild pulmonary vascular congestion.    Opacities present at the left lung base, probably related to atelectasis,   but an early infiltrate is not excluded.    Impression:          Electronically signed by Chito Loco D.O. on 01-04-23 at 0234          Results for orders placed during the hospital encounter of 07/13/22    Adult Transthoracic Echo Complete w/ Color, Spectral and Contrast if Necessary Per Protocol    Interpretation Summary  · Left ventricular ejection fraction appears to be 56 - 60%. Left ventricular systolic function is normal.  · Left ventricular wall thickness  is consistent with mild concentric hypertrophy  · Left ventricular diastolic function is consistent with (grade II w/high LAP) pseudonormalization.  · Normal right ventricular cavity size and systolic function noted.  · The left atrial cavity is mildly dilated.  · Severe aortic valve stenosis is present.  · Aortic valve area is 0.84 cm2. Peak velocity of the flow distal to the aortic valve is 449.3 cm/s. Aortic valve maximum pressure gradient is 80.8 mmHg. Aortic valve mean pressure gradient is 45.9 mmHg  · There is severe mitral annular calcification the mitral valve leaflets are thickened and appear to be restricted in motion  · Moderate mitral valve stenosis is present. The mitral valve peak gradient is 16.2 mmHg. The mitral valve mean gradient is 6.2 mmHg.  · Mild tricuspid valve regurgitation is present.  · Calculated right ventricular systolic pressure from tricuspid regurgitation is 38 mmHg.  · There is no evidence of pericardial effusion      No orders to display        Assessment/Plan     Active Hospital Problems    Diagnosis  POA   • Acute UTI (urinary tract infection) [N39.0]  Unknown   • Hypokalemia [E87.6]  Unknown   • Severe aortic stenosis [I35.0]  Unknown   • Combined systolic and diastolic congestive heart failure (HCC) [I50.40]  Unknown   • Depression [F32.A]  Unknown   • ANDREW (obstructive sleep apnea) [G47.33]  Unknown   • ESRD (end stage renal disease) (HCC) [N18.6]  Yes   • Morbid obesity with BMI of 50.0-59.9, adult (HCC) [E66.01, Z68.43]  Not Applicable   • Metastatic breast cancer (HCC) [C50.919]  Yes   • Normocytic anemia [D64.9]  Yes   • Diabetes type 2, controlled (HCC) [E11.9]  Yes   • Hyperlipidemia [E78.5]  Yes   • Hypertension [I10]  Yes      Resolved Hospital Problems   No resolved problems to display.       Ms. Hall is a 62 y.o. non-smoker with a history of metastatic breast cancer, ESRD on hemodialysis, anemia of chronic disease, type 2 diabetes, combined systolic/diastolic CHF,  severe aortic stenosis, peripheral neuropathy who presents with malaise/weakness/LUTS and is found to have a UTI with worsening anemia.    Acute UTI  - UA c/w UTI w/ + nitrites, large LE, 4+ bacteria, many WBC; procal 1.25  - continue ceftriaxone, f/u on urine cultures    ESRD on home HD  Hypokalemia  - nephrology consulted  - patient does home hemodialysis 5x/week  - TuThSat scheduled planned for inpatient  - resume high dose epogen  - f/u am labs    Acute on chronic anemia of chronic disease/underlying malignancy/myelosuppression from chemo   -anemia multifactorial as above  - hgb 7.8, down-trended to 7.0 this am  - agree with transfusion as ordered per Dr. Diaz    Type 2 diabetes mellitus  - continue basal/bolus dosing of insulin  - adjust as needed  - last a1c 1 year ago 6.0; recheck pending    Combined systolic/diastolic CHF  Severe aortic stenosis  - echo as above   - coreg  - D/w Dr. Diaz- cardiology consult for TAVR consideration    Peripheral neuropathy  - home gabapentin- monitor for toxicity    Hypothyroidism  - levothyroxine    Metastatic lobular breast cancer  - followed by Dr. Irvin  - metastatic to bone, omentum  - will c/s given worsening anemia and pt asking about resuming chemo    Depression  - sertraline, armodafinil     ANDREW  - ok for home CPAP    · I discussed the patient's findings and my recommendations with patient, nursing staff and consulting provider. Dr. Diaz    VTE Prophylaxis - SCDs.  Code Status - Full code.     D/w Dr. Joe Harden MD  [facility]  01/04/23  16:46 EST

## 2023-01-04 NOTE — CASE MANAGEMENT/SOCIAL WORK
Continued Stay Note  [facility]     Patient Name: Juana Hall  MRN: [id number]  Today's Date: 1/4/2023    Admit Date: 1/3/2023    Plan: DC home with spouse, no needs   Discharge Plan     Row Name 01/04/23 1016       Plan    Plan DC home with spouse, no needs    Plan Comments CCP spoke to pts spouse, introduced self, role and reviewed face sheet.  Pt sleeping.  Per spouse they live in a 1 story home with no steps.  Pt is independent with ADL's and uses walker in the home and an electric scooter outside the home.  Pt also uses a cpap at night.  She has hers at bedside. Spouse assists with Peritoneal Dialysis through a fistula in her right arm.  Pt also takes a Chemo pill two times a day called Verzenio.  I did not see this medication on pts active meds here in obs unit.  CCP called pharmacy and will have pharm tech speak to pts spouse to see if he brought the medication in.  Pt uses [facility] but wants to use [facility] on dc.  They have a Living Will and POA.  CCP recommended that they have their PCP scan a copy into her chart.  Spouse voiced understanding.  Pts plan is to dc home with spouse.  He will transport home.  No needs identified at this time.  CCP will continue to follow. Thanks, Carla RADER               Discharge Codes    No documentation.                     Carla Sarmiento

## 2023-01-04 NOTE — ED PROVIDER NOTES
" EMERGENCY DEPARTMENT ENCOUNTER    Room Number:  22/22  Date seen:  1/4/2023  PCP: Kiana Gramajo APRN  Historian: Patient,  at bedside who helps with history      HPI:  Chief Complaint: Generalized weakness  A complete HPI/ROS/PMH/PSH/SH/FH are unobtainable due to:   Context: Juana Hall is a 62 y.o. female who presents to the ED c/o generalized weakness.  Patient has had progressive weakness ongoing over the last several weeks.  She states she feels like she might have a \"urinary tract infection\".  She had that she has had some blood in her urine and slight pain with urination.  Patient has a history of end-stage renal disease on peritoneal dialysis and does make urine on most days but has made less recently.  Denies fever at home but was febrile at triage.  She has had occasional cough but denies chest pain or trouble breathing.  She is continue to do peritoneal dialysis and has not had problems.  Denies any change of peritoneal fluid denies any significant abdominal pain.  She has had some nausea and some vomiting.      MEDICAL RECORD REVIEW (non ED)  I was reviewed prior medical records including most recent oncology note.  Patient has a history of multiple medical problems including breast cancer, end-stage renal disease on peritoneal dialysis as well as diabetes, hypertension hyperlipidemia.    PAST MEDICAL HISTORY  Active Ambulatory Problems     Diagnosis Date Noted   • Anxiety    • Depression    • Diabetes type 2, controlled (HCC)    • Hyperlipidemia    • Heart murmur    • Hypertension    • Post-traumatic osteoarthritis of multiple joints    • Psoriasis    • Radiculopathy    • Acquired hypothyroidism 09/26/2016   • Peripheral neuropathy 02/25/2019   • Normocytic anemia 07/22/2019   • History of right breast cancer 07/22/2019   • Omental mass 07/22/2019   • Metastatic breast cancer (HCC) 08/12/2019   • Elevated serum creatinine 09/10/2019   • Iron deficiency anemia 08/07/2020   • Anemia " in stage 3 chronic kidney disease (HCC) 09/04/2020   • Stage 3b chronic kidney disease (HCC) 03/10/2021   • Anxiety and depression    • RYAN (acute kidney injury) (HCC) 04/29/2021   • Anemia, chronic disease 04/29/2021   • Diastolic CHF, chronic (HCC) 04/29/2021   • Frequent UTI    • Metabolic acidosis 04/29/2021   • Severe malnutrition (HCC) 04/30/2021   • Hydronephrosis 12/16/2021   • Generalized weakness 01/28/2022   • COVID-19 virus detected 01/28/2022   • Hypocalcemia 01/28/2022   • Morbid obesity with BMI of 50.0-59.9, adult (HCC) 01/28/2022   • ESRD (end stage renal disease) (HCC) 01/28/2022   • Pancytopenia due to chemotherapy (HCC) 01/28/2022   • Chronic anemia 08/16/2022   • Rectal bleeding 08/16/2022   • Nonrheumatic aortic valve stenosis 08/16/2022   • Bicuspid aortic valve 08/18/2022     Resolved Ambulatory Problems     Diagnosis Date Noted   • No Resolved Ambulatory Problems     Past Medical History:   Diagnosis Date   • Anemia    • Breast cancer (HCC) 2004   • CHF (congestive heart failure) (HCC)    • CKD (chronic kidney disease)    • Diabetes mellitus (HCC)    • Dialysis patient (HCC)    • Elevated cholesterol    • History of kidney stones    • History of MRSA infection 2005   • History of sepsis 2010   • History of transfusion    • Hyperthyroidism    • Hypothyroidism    • Kidney stone    • Lymphedema of leg    • Metastasis from breast cancer (HCC)    • Neuromuscular disorder (HCC)    • Obesity    • ANDREW on CPAP    • Osteoarthrosis    • Thickened endometrium    • Weakness          PAST SURGICAL HISTORY  Past Surgical History:   Procedure Laterality Date   • ARTERIOVENOUS FISTULA/SHUNT SURGERY Left 11/03/2021    Procedure: LEFT  BRACHIOCEPALIC ARTERIOVENOUS FISTULA;  Surgeon: Dejuan Adler MD;  Location: [facility];  Service: Vascular;  Laterality: Left;   • BREAST RECONSTRUCTION  2004    WITH IMPLANTS, AND REVISION, NOW REMOVED   • BREAST SURGERY  2004   • CARDIAC CATHETERIZATION N/A  2022    Procedure: CORONARY ANGIOGRAPHY;  Surgeon: Luis Rivers MD;  Location: [facility] CATH INVASIVE LOCATION;  Service: Cardiology;  Laterality: N/A;   • CARDIAC CATHETERIZATION N/A 2022    Procedure: Right Heart Cath;  Surgeon: Luis Rivers MD;  Location: [facility] CATH [facility]ASIVE LOC[facility];  Service: Cardiology;  Laterality: N/A;   • CATARACT EXTRACTION WITH INTRAOCULAR LENS IMPLANT Bilateral    •  SECTION  1987   •  SECTION  1987   • CYSTOSCOPY W/ URETERAL STENT PLACEMENT  2010   • CYSTOSCOPY W/ URETERAL STENT PLACEMENT Left 2021    Procedure: CYSTOSCOPY KEFT RETROGRADE PYELOGRAM  LEFT  URETERAL STENT PLACEMENT;  Surgeon: Leodan Mckee Jr., MD;  Location: [facility] MAIN OR;  Service: Urology;  Laterality: Left;   • CYSTOSCOPY W/ URETERAL STENT PLACEMENT Left 03/10/2022    Procedure: CYSTOSCOPY AND LEFT URETERAL STENT EXCHANGE, RETROGRADE PYELOGRAM;  Surgeon: Leodan Mckee Jr., MD;  Location: [facility] MAIN OR;  Service: Urology;  Laterality: Left;   • D & C HYSTEROSCOPY N/A 2017    Procedure: DILATATION AND CURETTAGE, HYSTEROSCOPY ;  Surgeon: Cherie Oliveros MD;  Location: [facility] OR [facility];  Service:    • D & C HYSTEROSCOPY N/A 2019    Procedure: DILATATION AND CURETTAGE HYSTEROSCOPY/POLYPECTOMY;  Surgeon: Cherie Oliveros MD;  Location: [facility] OR [facility];  Service: Gynecology   • INSERTION AND REMOVAL HEMODIALYSIS CATHETER Right 2021   • INSERTION HEMODIALYSIS CATHETER Right 2021    Procedure: HEMODIALYSIS CATHETER INSERTION;  Surgeon: lCint Hernández MD;  Location: [facility] MAIN OR;  Service: Vascular;  Laterality: Right;   • LYMPH NODE BIOPSY     • MASTECTOMY Bilateral          FAMILY HISTORY  Family History   Problem Relation Age of Onset   • Heart attack Mother 72   • Coronary artery disease Mother         Mother  from MI at 72   • Diabetes Mother    • Breast cancer Mother    • Hyperlipidemia Mother    •  Arthritis Mother    • Cancer Mother    • Heart attack Father 74   • Aortic aneurysm Father         Father with ruptured thoracic aortic aneurysm   • Coronary artery disease Father         Father  from MI at 74   • Heart disease Father    • Hyperlipidemia Father    • Arthritis Father    • Heart attack Maternal Grandmother 76   • Coronary artery disease Maternal Grandmother         MGM deceeased from MI at age 76   • Malig Hyperthermia Neg Hx          SOCIAL HISTORY  Social History     Socioeconomic History   • Marital status:      Spouse name: Rk   Tobacco Use   • Smoking status: Never   • Smokeless tobacco: Never   Vaping Use   • Vaping Use: Never used   Substance and Sexual Activity   • Alcohol use: No   • Drug use: No   • Sexual activity: Yes     Partners: Male     Birth control/protection: Post-menopausal         ALLERGIES  Patient has no known allergies.        REVIEW OF SYSTEMS  Review of Systems   Constitutional: Positive for fever (Febrile at triage). Negative for unexpected weight change.   Gastrointestinal: Positive for nausea.   Genitourinary: Positive for dysuria and hematuria.   Neurological: Positive for weakness.            PHYSICAL EXAM  ED Triage Vitals [23 2245]   Temp Heart Rate Resp BP SpO2   100.2 °F (37.9 °C) 70 16 135/70 96 %      Temp src Heart Rate Source Patient Position BP Location FiO2 (%)   Oral -- -- -- --       Physical Exam    GENERAL: Nontoxic-appearing female in no obvious distress.  Triage vitals reviewed notable for temperature 100.2.  Heart rate 70.  Blood pressure 135/70.  O2 saturations have been running in the upper 80s on room air.  HENT: nares patent  EYES: no scleral icterus  CV: regular rhythm, regular rate- systolic murmur which is chronic per patient  RESPIRATORY: normal effort, decreased breath sounds- O2 sats 95% on 1 L nasal cannula  ABDOMEN: soft morbidly obese without significant tenderness to palpation  MUSCULOSKELETAL: no deformity  NEURO:  Strength-mild generalized weakness without focal deficit.  Sensation and coordination are grossly intact.  Speech and mentation are unremarkable  SKIN: warm, dry-fistula in left upper arm without erythema or warmth      Vital signs and nursing notes reviewed.          LAB RESULTS  Recent Results (from the past 24 hour(s))   Green Top (Gel)    Collection Time: 01/03/23 11:17 PM   Result Value Ref Range    Extra Tube Hold for add-ons.    Lavender Top    Collection Time: 01/03/23 11:17 PM   Result Value Ref Range    Extra Tube hold for add-on    Gold Top - SST    Collection Time: 01/03/23 11:17 PM   Result Value Ref Range    Extra Tube Hold for add-ons.    Light Blue Top    Collection Time: 01/03/23 11:17 PM   Result Value Ref Range    Extra Tube Hold for add-ons.    Comprehensive Metabolic Panel    Collection Time: 01/03/23 11:17 PM    Specimen: Blood   Result Value Ref Range    Glucose 201 (H) 65 - 99 mg/dL    BUN 25 (H) 8 - 23 mg/dL    Creatinine 4.98 (H) 0.57 - 1.00 mg/dL    Sodium 134 (L) 136 - 145 mmol/L    Potassium 3.0 (L) 3.5 - 5.2 mmol/L    Chloride 97 (L) 98 - 107 mmol/L    CO2 29.0 22.0 - 29.0 mmol/L    Calcium 8.3 (L) 8.6 - 10.5 mg/dL    Total Protein 6.7 6.0 - 8.5 g/dL    Albumin 2.7 (L) 3.5 - 5.2 g/dL    ALT (SGPT) 6 1 - 33 U/L    AST (SGOT) 12 1 - 32 U/L    Alkaline Phosphatase 80 39 - 117 U/L    Total Bilirubin 0.6 0.0 - 1.2 mg/dL    Globulin 4.0 gm/dL    A/G Ratio 0.7 g/dL    BUN/Creatinine Ratio 5.0 (L) 7.0 - 25.0    Anion Gap 8.0 5.0 - 15.0 mmol/L    eGFR 9.2 (L) >60.0 mL/min/1.73   Procalcitonin    Collection Time: 01/03/23 11:17 PM    Specimen: Blood   Result Value Ref Range    Procalcitonin 1.25 (H) 0.00 - 0.25 ng/mL   CBC Auto Differential    Collection Time: 01/03/23 11:17 PM    Specimen: Blood   Result Value Ref Range    WBC 4.61 3.40 - 10.80 10*3/mm3    RBC 2.50 (L) 3.77 - 5.28 10*6/mm3    Hemoglobin 7.8 (L) 12.0 - 15.9 g/dL    Hematocrit 24.0 (L) 34.0 - 46.6 %    MCV 96.0 79.0 - 97.0 fL     MCH 31.2 26.6 - 33.0 pg    MCHC 32.5 31.5 - 35.7 g/dL    RDW 16.6 (H) 12.3 - 15.4 %    RDW-SD 57.7 (H) 37.0 - 54.0 fl    MPV 9.7 6.0 - 12.0 fL    Platelets 98 (L) 140 - 450 10*3/mm3    Neutrophil % 77.9 (H) 42.7 - 76.0 %    Lymphocyte % 10.8 (L) 19.6 - 45.3 %    Monocyte % 7.6 5.0 - 12.0 %    Eosinophil % 2.4 0.3 - 6.2 %    Basophil % 0.4 0.0 - 1.5 %    Immature Grans % 0.9 (H) 0.0 - 0.5 %    Neutrophils, Absolute 3.59 1.70 - 7.00 10*3/mm3    Lymphocytes, Absolute 0.50 (L) 0.70 - 3.10 10*3/mm3    Monocytes, Absolute 0.35 0.10 - 0.90 10*3/mm3    Eosinophils, Absolute 0.11 0.00 - 0.40 10*3/mm3    Basophils, Absolute 0.02 0.00 - 0.20 10*3/mm3    Immature Grans, Absolute 0.04 0.00 - 0.05 10*3/mm3    nRBC 0.0 0.0 - 0.2 /100 WBC   Magnesium    Collection Time: 01/03/23 11:17 PM    Specimen: Blood   Result Value Ref Range    Magnesium 2.0 1.6 - 2.4 mg/dL   COVID-19 and FLU A/B PCR - Swab, Nasopharynx    Collection Time: 01/04/23 12:50 AM    Specimen: Nasopharynx; Swab   Result Value Ref Range    COVID19 Not Detected Not Detected - Ref. Range    Influenza A PCR Not Detected Not Detected    Influenza B PCR Not Detected Not Detected   Urinalysis With Culture If Indicated - Straight Cath    Collection Time: 01/04/23 12:51 AM    Specimen: Straight Cath; Urine   Result Value Ref Range    Color, UA Red (A) Yellow, Straw    Appearance, UA Turbid (A) Clear    pH, UA 6.0 5.0 - 8.0    Specific Gravity, UA 1.019 1.005 - 1.030    Glucose, UA Negative Negative    Ketones, UA Negative Negative    Bilirubin, UA Negative Negative    Blood, UA Large (3+) (A) Negative    Protein, UA >=300 mg/dL (3+) (A) Negative    Leuk Esterase, UA Large (3+) (A) Negative    Nitrite, UA Positive (A) Negative    Urobilinogen, UA 0.2 E.U./dL 0.2 - 1.0 E.U./dL   Lactic Acid, Plasma    Collection Time: 01/04/23 12:59 AM    Specimen: Blood   Result Value Ref Range    Lactate 0.9 0.5 - 2.0 mmol/L       Ordered the above labs and reviewed the  results.        RADIOLOGY  No Radiology Exams Resulted Within Past 24 Hours    Ordered the above noted radiological studies. Reviewed by me in PACS.            PROCEDURES  Procedures          MEDICATIONS GIVEN IN ER  Medications   sodium chloride 0.9 % flush 10 mL (has no administration in time range)   cefTRIAXone (ROCEPHIN) 1 g in sodium chloride 0.9 % 100 mL IVPB-VTB (1 g Intravenous New Bag 1/4/23 0212)               MEDICAL DECISION MAKING, PROGRESS, and CONSULTS    All labs have been independently reviewed by me.  All radiology studies have been reviewed by me and I have also reviewed the radiology report.   EKG's independently viewed and interpreted by me.  Discussion below represents my analysis of pertinent findings related to patient's condition, differential diagnosis, treatment plan and final disposition.      Additional sources:  - Discussed/ obtained information from independent historians:  at bedside    - External (non-ED) record review: Please see documented above    - Chronic or social conditions impacting care: Morbid obesity, end-stage renal disease on dialysis, chronic anemia    - Shared decision making: I discussed ED evaluation and treatment plan with patient who is in agreement.   Patient very complicated with multiple sources of fever.  While I suspect fevers related to urinary tract infection patient does have chronic heart murmur and would consider endocarditis and peritonitis is less common or likely causes of infection.      Orders placed during this visit:  Orders Placed This Encounter   Procedures   • Blood Culture - Blood,   • Blood Culture - Blood,   • COVID-19 and FLU A/B PCR - Swab, Nasopharynx   • XR Chest 1 View   • [place] Draw   • Comprehensive Metabolic Panel   • Urinalysis With Culture If Indicated - Urine, Catheter   • Procalcitonin   • Lactic Acid, Plasma   • CBC Auto Differential   • Urinalysis, Microscopic Only - Urine, Clean Catch   • Magnesium   • [facility] (on-call MD  unless specified) Details   • Insert peripheral IV   • Green Top (Gel)   • Lavender Top   • Gold Top - SST   • Light Blue Top   • CBC & Differential           Differential diagnosis:    Please see as documented below in ED course      Independent interpretation of labs, radiology studies, and discussions with consultants:  ED Course as of 01/04/23 0223 Wed Jan 04, 2023   0118 CBC reviewed shows a white blood cell count of 4.6.  Which in general would go against bacterial infection but is not excluded.     [DB]   0118 Hemoglobin of 7.8 is slightly worsened when compared to recent values but patient has chronic anemia and is somewhat near her baseline. [DB]   0124 MDM-complicated patient with multiple medical problems including end-stage renal disease on dialysis.  She does receive peritoneal dialysis on a daily basis at home.  She presents with worsening of generalized weakness.  Having difficulty walking although she generally walks with a walker and is somewhat obese.    On exam patient is well-appearing but is febrile at triage with temp of 100.2.  On exam I see no obvious clear focus of infection.  She has been somewhat hypoxic on room air but breath sounds sound unremarkable.  Abdominal exam is benign without significant tenderness to palpation. [DB]   0125 Differential diagnosis in this complicated patient would include but is not limited the following:    Urinary tract infection  Upper respiratory tract infection including pneumonia or viral illness  Electrolyte  Worsening of chronic renal failure  Anemia [DB]   0126 At this point patient with generalized weakness and multiple comorbidities.  Urinalysis is abnormal and suggestive of urinary tract infection.  Patient is febrile with temperature of 100.2.  We will go ahead and give IV Rocephin, consult hospitalist about admission for further work-up. [DB]   0143 Chest x-ray independently reviewed shows no obvious pneumonia although x-ray is limited by  patient's body habitus and portable film. [DB]   0205 I discussed evaluation treatment this patient with Annabelle from [facility] who will admit on behalf of Dr. Ronald Tatum. [DB]      ED Course User Index  [DB] Shine Sanchez MD             I used full protective equipment while examining this patient.  This includes face mask, gloves and protective eyewear.  I washed my hands before entering the room and immediately upon leaving the room    DIAGNOSIS  Final diagnoses:   Complicated UTI (urinary tract infection)   Generalized weakness   End-stage renal disease on peritoneal dialysis (HCC)   Heart murmur   Chronic anemia   Acute respiratory failure with hypoxia ([facility])         DISPOSITION  Admission            Latest Documented Vital Signs:  As of 02:23 EST  BP- 99/46 HR- 64 Temp- 100.2 °F (37.9 °C) (Oral) O2 sat- 99%              --    Please note that portions of this were completed with a voice recognition program.       Note Disclaimer: At [facility], we believe that sharing information builds trust and better relationships. You are receiving this note because you are receiving care at [facility] or recently visited. It is possible you will see health information before a provider has talked with you about it. This kind of information can be easy to misunderstand. To help you fully understand what it means for your health, we urge you to discuss this note with your provider.           Shine Sanchez MD  01/04/23 7554

## 2023-01-04 NOTE — CONSULTS
Referring Provider:       Patient Care Team:  Kiana Gramajo APRN as PCP - General (Family Medicine)  Pasha Harkins MD as Referring Physician (Cardiology)  Leodan Irvin Jr., MD as Consulting Physician (Hematology and Oncology)  Tai Antonio MD as Consulting Physician (Nephrology)      Reason for Consultation:   Severe degenerative aortic valve stenosis  Chronic heart failure with preserved ejection fraction  Bicuspid aortic valve  Aortic valve stenosis       HISTORY OF PRESENT ILLNESS:   62-year-old female with a medical history of metastatic breast cancer, end-stage renal disease on hemodialysis, normocytic anemia, obesity, immobility, chronic normocytic anemia, and bicuspid aortic valve with severe stenosis who presents with worsening fatigue and severe anemia along with progressive thrombocytopenia.    She did undergo a transthoracic echocardiogram in July 2022 with severe degenerative aortic valve stenosis and a mean gradient of 45 mmHg across her bicuspid aortic valve.  Her case was reviewed by the structural heart team. Secondary to her multiple issues she was felt to be not to be an acceptable candidate for transcatheter aortic valve replacement.  This was discussed by all of our team members in the structural heart conference.    Her main complaint this point in time is fatigue and dyspnea.  She has been followed by nephrology and hematology/oncology.  Currently receiving packed cells.      Review of Systems  All other systems reviewed and negative.     Past Medical History:   Past Medical History:   Diagnosis Date   • Anemia    • Anxiety and depression    • Bicuspid aortic valve    • Breast cancer (HCC) 2004    RIGHT, HAD NEELA. MASTECTOMY, CHEMO AND RADIATION   • CHF (congestive heart failure) (HCC)    • CKD (chronic kidney disease)    • Diabetes mellitus (HCC)    • Dialysis patient (HCC)     TUES AND SATURDAY REBEKA GUIDO   • Elevated cholesterol    • Frequent UTI    • Heart murmur     • History of kidney stones    • History of MRSA infection     POST BREAST SURGERY-TREATED [facility]   • History of sepsis 2010    KIDNEY STONE   • History of transfusion    • Hyperlipidemia    • Hypertension    • Hyperthyroidism    • Hypothyroidism    • Kidney stone     CURRENT LEFT-DEC 2021   • Lymphedema of leg     LEFT   • Metastasis from breast cancer (HCC)     STOMACH-OMENTUM   • Neuromuscular disorder (HCC)    • Obesity    • ANDREW on CPAP     CPAP   • Osteoarthrosis    • Peripheral neuropathy     FEET BILAT   • Radiculopathy    • Rectal bleeding 2022   • Thickened endometrium    • Weakness     BILAT LEGS-WITH ANY AMT OF TIME       Past Surgical History:   Past Surgical History:   Procedure Laterality Date   • ARTERIOVENOUS FISTULA/SHUNT SURGERY Left 2021    Procedure: LEFT  BRACHIOCEPALIC ARTERIOVENOUS FISTULA;  Surgeon: Dejuan Adler MD;  Location: [facility] OR;  Service: Vascular;  Laterality: Left;   • BREAST RECONSTRUCTION      WITH IMPLANTS, AND REVISION, NOW REMOVED   • BREAST SURGERY     • CARDIAC CATHETERIZATION N/A 2022    Procedure: CORONARY ANGIOGRAPHY;  Surgeon: Luis Rivers MD;  Location: [facility] CATH INVASIVE LOCATION;  Service: Cardiology;  Laterality: N/A;   • CARDIAC CATHETERIZATION N/A 2022    Procedure: Right Heart Cath;  Surgeon: Luis Rivers MD;  Location:  [facility] CATH INVASIVE LOCATION;  Service: Cardiology;  Laterality: N/A;   • CATARACT EXTRACTION WITH INTRAOCULAR LENS IMPLANT Bilateral    •  SECTION  1987   •  SECTION  1987   • CYSTOSCOPY W/ URETERAL STENT PLACEMENT     • CYSTOSCOPY W/ URETERAL STENT PLACEMENT Left 2021    Procedure: CYSTOSCOPY KEFT RETROGRADE PYELOGRAM  LEFT  URETERAL STENT PLACEMENT;  Surgeon: Leodan Mckee Jr., MD;  Location: [facility] OR;  Service: Urology;  Laterality: Left;   • CYSTOSCOPY W/ URETERAL STENT PLACEMENT Left 03/10/2022    Procedure:  CYSTOSCOPY AND LEFT URETERAL STENT EXCHANGE, RETROGRADE PYELOGRAM;  Surgeon: Leodan Mckee Jr., MD;  Location: [facility] MAIN OR;  Service: Urology;  Laterality: Left;   • D & C HYSTEROSCOPY N/A 2017    Procedure: DILATATION AND CURETTAGE, HYSTEROSCOPY ;  Surgeon: Cherie Oliveros MD;  Location:  [facility] OR [facility];  Service:    • D & C HYSTEROSCOPY N/A 2019    Procedure: DILATATION AND CURETTAGE HYSTEROSCOPY/POLYPECTOMY;  Surgeon: Cherie Oliveros MD;  Location:  [facility];  Service: Gynecology   • INSERTION AND REMOVAL HEMODIALYSIS CATHETER Right 2021   • INSERTION HEMODIALYSIS CATHETER Right 2021    Procedure: HEMODIALYSIS CATHETER INSERTION;  Surgeon: Clint Hernández MD;  Location: [facility] MAIN OR;  Service: Vascular;  Laterality: Right;   • LYMPH NODE BIOPSY     • MASTECTOMY Bilateral        Family History:   Family History   Problem Relation Age of Onset   • Heart attack Mother 72   • Coronary artery disease Mother         Mother  from MI at 72   • Diabetes Mother    • Breast cancer Mother    • Hyperlipidemia Mother    • Arthritis Mother    • Cancer Mother    • Heart attack Father 74   • Aortic aneurysm Father         Father with ruptured thoracic aortic aneurysm   • Coronary artery disease Father         Father  from MI at 74   • Heart disease Father    • Hyperlipidemia Father    • Arthritis Father    • Heart attack Maternal Grandmother 76   • Coronary artery disease Maternal Grandmother         MGM deceeased from MI at age 76   • Malig Hyperthermia Neg Hx        Social History:   Social History     Tobacco Use   • Smoking status: Never   • Smokeless tobacco: Never   Vaping Use   • Vaping Use: Never used   Substance Use Topics   • Alcohol use: No   • Drug use: No       Home Medications:   Medications Prior to Admission   Medication Sig Dispense Refill Last Dose   • abemaciclib (VERZENIO) 150 mg tablet chemo tablet Take 1 tablet by mouth 2 (Two) Times a Day. 60 tablet  5 1/3/2023 at 2200   • acetaminophen (TYLENOL) 500 MG tablet Take 500 mg by mouth As Needed.      • Armodafinil 250 MG tablet Take 1 tablet by mouth Daily. 30 tablet 2 1/3/2023 at 0900   • aspirin 81 MG EC tablet Take 81 mg by mouth Daily.   1/3/2023 at 2200   • atorvastatin (LIPITOR) 40 MG tablet TAKE ONE TABLET BY MOUTH ONCE NIGHTLY 30 tablet 0 1/3/2023 at 2200   • carvedilol (COREG) 3.125 MG tablet TAKE ONE TABLET BY MOUTH TWICE A  tablet 1 1/3/2023 at 0900   • folic acid (FOLVITE) 1 MG tablet TAKE ONE TABLET BY MOUTH DAILY 30 tablet 2 1/3/2023 at 0900   • gabapentin (Neurontin) 300 MG capsule Take 1 capsule by mouth Every Night. 60 capsule 2 1/2/2023   • Levemir FlexTouch 100 UNIT/ML injection Inject 50 Units under the skin into the appropriate area as directed Daily. 30 mL 0 Past Week   • levothyroxine (SYNTHROID, LEVOTHROID) 50 MCG tablet Take 1 tablet by mouth Daily. 90 tablet 1 1/3/2023 at 0900   • mupirocin (BACTROBAN) 2 % ointment       • ondansetron (Zofran) 8 MG tablet Take 1 tablet by mouth Every 8 (Eight) Hours As Needed for Nausea or Vomiting. 30 tablet 2    • sertraline (ZOLOFT) 100 MG tablet Take 1.5 tablets by mouth Daily. 135 tablet 0 1/3/2023 at 2200   • vitamin B-12 (CYANOCOBALAMIN) 1000 MCG tablet Take 1,000 mcg by mouth Daily.   1/3/2023 at 0900   • NON FORMULARY Take 5 mg by mouth 2 (Two) Times a Day. Versinio      • NON FORMULARY Inject 1 dose under the skin into the appropriate area as directed Every 30 (Thirty) Days. facidex          Current Medications:   Current Facility-Administered Medications:   •  acetaminophen (TYLENOL) tablet 650 mg, 650 mg, Oral, Q4H PRN **OR** acetaminophen (TYLENOL) 160 MG/5ML solution 650 mg, 650 mg, Oral, Q4H PRN **OR** acetaminophen (TYLENOL) suppository 650 mg, 650 mg, Rectal, Q4H PRN, Annabelle Keller, APRN  •  Armodafinil 250 mg, 250 mg, Oral, Daily, Lorena Harden MD, 250 mg at 01/04/23 1352  •  atorvastatin (LIPITOR) tablet 40 mg, 40 mg,  Oral, Nightly, Lorena Harden MD  •  cefTRIAXone (ROCEPHIN) 1 g in sodium chloride 0.9 % 100 mL IVPB-VTB, 1 g, Intravenous, Q24H, Annabelle Keller APRN  •  dextrose (D50W) (25 g/50 mL) IV injection 25 g, 25 g, Intravenous, Q15 Min PRN, Annabelle Keller APRN  •  dextrose (GLUTOSE) oral gel 15 g, 15 g, Oral, Q15 Min PRN, Annabelle Keller APRN  •  [START ON 1/5/2023] epoetin jana-epbx (RETACRIT) injection 20,000 Units, 20,000 Units, Intravenous, Once per day on Mon Wed Fri, Tai Antonio MD  •  folic acid (FOLVITE) tablet 1,000 mcg, 1,000 mcg, Oral, Daily, Lorena Harden MD, 1,000 mcg at 01/04/23 0858  •  gabapentin (NEURONTIN) capsule 300 mg, 300 mg, Oral, Nightly, Lorena Harden MD  •  glucagon (human recombinant) (GLUCAGEN DIAGNOSTIC) injection 1 mg, 1 mg, Intramuscular, Q15 Min PRN, Annabelle Keller APRN  •  insulin glargine (LANTUS, SEMGLEE) injection 25 Units, 25 Units, Subcutaneous, QAM, Lorena Harden MD, 25 Units at 01/04/23 0858  •  insulin lispro (ADMELOG) injection 0-7 Units, 0-7 Units, Subcutaneous, TID AC, Annabelle Keller APRN, 3 Units at 01/04/23 1246  •  levothyroxine (SYNTHROID, LEVOTHROID) tablet 50 mcg, 50 mcg, Oral, Daily, Lorena Harden MD, 50 mcg at 01/04/23 0858  •  nitroglycerin (NITROSTAT) SL tablet 0.4 mg, 0.4 mg, Sublingual, Q5 Min PRN, Annabelle Keller APRN  •  ondansetron (ZOFRAN) injection 4 mg, 4 mg, Intravenous, Q6H PRN, Annabelle Keller APRN, 4 mg at 01/04/23 1033  •  sertraline (ZOLOFT) tablet 150 mg, 150 mg, Oral, Daily, Lorena Harden MD, 150 mg at 01/04/23 0900  •  sodium chloride 0.9 % flush 10 mL, 10 mL, Intravenous, PRN, DanielShine MD  •  sodium chloride 0.9 % flush 10 mL, 10 mL, Intravenous, Q12H, Annabelle Keller, APRN, 10 mL at 01/04/23 0859  •  sodium chloride 0.9 % flush 10 mL, 10 mL, Intravenous, PRN, Annabelle Keller, APRN  •  sodium chloride 0.9 % infusion 40 mL, 40 mL, Intravenous, PRN,  "Annabelle Keller, APRN  •  vitamin B-12 (CYANOCOBALAMIN) tablet 1,000 mcg, 1,000 mcg, Oral, Daily, Lorena Harden MD, 1,000 mcg at 01/04/23 0900     Allergies: Patient has no known allergies.      Vital Signs   Temp:  [98.1 °F (36.7 °C)-100.2 °F (37.9 °C)] 98.3 °F (36.8 °C)  Heart Rate:  [54-84] 54  Resp:  [16-18] 18  BP: ()/(34-70) 104/55  Flowsheet Rows    Flowsheet Row First Filed Value   Admission Height 170.2 cm (67\") Documented at 01/03/2023 2245   Admission Weight 165 kg (363 lb 12.1 oz) Documented at 01/03/2023 2245          General Appearance:    Morbidly obese   Head:    Normocephalic, without obvious abnormality, atraumatic       Neck:   No adenopathy, supple, no thyromegaly, no carotid bruit, no    JVD   Lungs:     Clear to auscultation bilaterally, no wheezes, rales, or     rhonchi    Heart:    Normal rate, regular rhythm, III/VI systolic murmur peak late, no rub, no gallop   Chest Wall:    No abnormalities observed   Abdomen:     Normal bowel sounds, soft, nontender, nondistended,            no rebound tenderness   Extremities:   No cyanosis, clubbing.  1+ bilateral lower extremity   Pulses:   Pulses palpable and equal bilaterally   Skin:   No bleeding or rash       Neurologic:   Cranial nerves 2 - 12 grossly intact, sensation intact               Results Review: I personally viewed and interpreted the patient's EKG/Telemetry data    Results from last 7 days   Lab Units 01/04/23  0650   WBC 10*3/mm3 3.84   HEMOGLOBIN g/dL 7.0*   HEMATOCRIT % 20.7*   PLATELETS 10*3/mm3 90*     Results from last 7 days   Lab Units 01/04/23  0650   SODIUM mmol/L 135*   POTASSIUM mmol/L 2.8*   CHLORIDE mmol/L 94*   CO2 mmol/L 27.8   BUN mg/dL 27*   CREATININE mg/dL 5.29*   GLUCOSE mg/dL 194*   CALCIUM mg/dL 7.8*     No results found for: TROPONINT    Assessment & Plan   1. Metastatic lobular breast cancer  2. ESRD: On hemodialysis  3. Anemia secondary to ESRD  4. Myelosuppression  5.  Chronic heart failure " with preserved ejection fraction: Secondary to severe degenerative aortic valve stenosis  6.  Urinary tract infection  7.  Morbid obesity    -Patient and I have had a conversation regarding aortic valve stenosis and symptoms  -I think that her symptoms are clearly multifactorial in the setting of her morbid obesity, metastatic breast cancer and end-stage renal disease  -I do agree with the initial impression that transcatheter aortic valve replacement should not be our first approach in this case.   -Case has been discussed with the structural heart team and we are in agreement  -I have recommended continued close monitoring while an inpatient.  Currently being transfused packed cells.  I do think the balloon aortic valvuloplasty as a temporizing/palliative measure is reasonable but I would not recommend doing that in the setting of severe anemia and urinary tract infection.  Could be done next week or as outpatient depending on clinical course.  We will continue to follow      I discussed the patient's findings and my recommendations with the patient

## 2023-01-05 ENCOUNTER — APPOINTMENT (OUTPATIENT)
Dept: LAB | Facility: HOSPITAL | Age: 63
End: 2023-01-05
Payer: COMMERCIAL

## 2023-01-05 ENCOUNTER — APPOINTMENT (OUTPATIENT)
Dept: ONCOLOGY | Facility: HOSPITAL | Age: 63
End: 2023-01-05
Payer: COMMERCIAL

## 2023-01-05 PROBLEM — T45.1X5A ADVERSE EFFECT OF CHEMOTHERAPY: Status: ACTIVE | Noted: 2023-01-05

## 2023-01-05 PROBLEM — I50.32 DIASTOLIC CHF, CHRONIC: Status: ACTIVE | Noted: 2023-01-05

## 2023-01-05 LAB
ALBUMIN SERPL-MCNC: 2.3 G/DL (ref 3.5–5.2)
ANION GAP SERPL CALCULATED.3IONS-SCNC: 10.9 MMOL/L (ref 5–15)
ANION GAP SERPL CALCULATED.3IONS-SCNC: 14.8 MMOL/L (ref 5–15)
BASOPHILS # BLD AUTO: 0.04 10*3/MM3 (ref 0–0.2)
BASOPHILS NFR BLD AUTO: 1 % (ref 0–1.5)
BH BB BLOOD EXPIRATION DATE: NORMAL
BH BB BLOOD EXPIRATION DATE: NORMAL
BH BB BLOOD TYPE BARCODE: 6200
BH BB BLOOD TYPE BARCODE: 6200
BH BB DISPENSE STATUS: NORMAL
BH BB DISPENSE STATUS: NORMAL
BH BB PRODUCT CODE: NORMAL
BH BB PRODUCT CODE: NORMAL
BH BB UNIT NUMBER: NORMAL
BH BB UNIT NUMBER: NORMAL
BUN SERPL-MCNC: 35 MG/DL (ref 8–23)
BUN SERPL-MCNC: 36 MG/DL (ref 8–23)
BUN/CREAT SERPL: 5.7 (ref 7–25)
BUN/CREAT SERPL: 6.2 (ref 7–25)
CA-I BLD-MCNC: 4.3 MG/DL (ref 4.6–5.4)
CA-I SERPL ISE-MCNC: 1.08 MMOL/L (ref 1.15–1.35)
CALCIUM SPEC-SCNC: 8 MG/DL (ref 8.6–10.5)
CALCIUM SPEC-SCNC: 8.2 MG/DL (ref 8.6–10.5)
CHLORIDE SERPL-SCNC: 95 MMOL/L (ref 98–107)
CHLORIDE SERPL-SCNC: 99 MMOL/L (ref 98–107)
CO2 SERPL-SCNC: 25.1 MMOL/L (ref 22–29)
CO2 SERPL-SCNC: 25.2 MMOL/L (ref 22–29)
CREAT SERPL-MCNC: 5.65 MG/DL (ref 0.57–1)
CREAT SERPL-MCNC: 6.33 MG/DL (ref 0.57–1)
CROSSMATCH INTERPRETATION: NORMAL
CROSSMATCH INTERPRETATION: NORMAL
DEPRECATED RDW RBC AUTO: 56.8 FL (ref 37–54)
EGFRCR SERPLBLD CKD-EPI 2021: 6.9 ML/MIN/1.73
EGFRCR SERPLBLD CKD-EPI 2021: 7.9 ML/MIN/1.73
EOSINOPHIL # BLD AUTO: 0.21 10*3/MM3 (ref 0–0.4)
EOSINOPHIL NFR BLD AUTO: 5.4 % (ref 0.3–6.2)
ERYTHROCYTE [DISTWIDTH] IN BLOOD BY AUTOMATED COUNT: 16.9 % (ref 12.3–15.4)
GLUCOSE BLDC GLUCOMTR-MCNC: 176 MG/DL (ref 70–130)
GLUCOSE BLDC GLUCOMTR-MCNC: 208 MG/DL (ref 70–130)
GLUCOSE BLDC GLUCOMTR-MCNC: 91 MG/DL (ref 70–130)
GLUCOSE SERPL-MCNC: 88 MG/DL (ref 65–99)
GLUCOSE SERPL-MCNC: 90 MG/DL (ref 65–99)
HBV SURFACE AG SERPL QL IA: NORMAL
HCT VFR BLD AUTO: 27.8 % (ref 34–46.6)
HGB BLD-MCNC: 9.1 G/DL (ref 12–15.9)
IMM GRANULOCYTES # BLD AUTO: 0.03 10*3/MM3 (ref 0–0.05)
IMM GRANULOCYTES NFR BLD AUTO: 0.8 % (ref 0–0.5)
LYMPHOCYTES # BLD AUTO: 0.57 10*3/MM3 (ref 0.7–3.1)
LYMPHOCYTES NFR BLD AUTO: 14.5 % (ref 19.6–45.3)
MAGNESIUM SERPL-MCNC: 1.9 MG/DL (ref 1.6–2.4)
MCH RBC QN AUTO: 30.7 PG (ref 26.6–33)
MCHC RBC AUTO-ENTMCNC: 32.7 G/DL (ref 31.5–35.7)
MCV RBC AUTO: 93.9 FL (ref 79–97)
MONOCYTES # BLD AUTO: 0.31 10*3/MM3 (ref 0.1–0.9)
MONOCYTES NFR BLD AUTO: 7.9 % (ref 5–12)
NEUTROPHILS NFR BLD AUTO: 2.76 10*3/MM3 (ref 1.7–7)
NEUTROPHILS NFR BLD AUTO: 70.4 % (ref 42.7–76)
NRBC BLD AUTO-RTO: 0 /100 WBC (ref 0–0.2)
PHOSPHATE SERPL-MCNC: 4.3 MG/DL (ref 2.5–4.5)
PLATELET # BLD AUTO: 100 10*3/MM3 (ref 140–450)
PMV BLD AUTO: 10.1 FL (ref 6–12)
POTASSIUM SERPL-SCNC: 3.1 MMOL/L (ref 3.5–5.2)
POTASSIUM SERPL-SCNC: 3.2 MMOL/L (ref 3.5–5.2)
RBC # BLD AUTO: 2.96 10*6/MM3 (ref 3.77–5.28)
SODIUM SERPL-SCNC: 131 MMOL/L (ref 136–145)
SODIUM SERPL-SCNC: 139 MMOL/L (ref 136–145)
UNIT  ABO: NORMAL
UNIT  ABO: NORMAL
UNIT  RH: NORMAL
UNIT  RH: NORMAL
WBC NRBC COR # BLD: 3.92 10*3/MM3 (ref 3.4–10.8)

## 2023-01-05 PROCEDURE — 82962 GLUCOSE BLOOD TEST: CPT

## 2023-01-05 PROCEDURE — 25010000002 CEFTRIAXONE PER 250 MG: Performed by: NURSE PRACTITIONER

## 2023-01-05 PROCEDURE — 99232 SBSQ HOSP IP/OBS MODERATE 35: CPT | Performed by: INTERNAL MEDICINE

## 2023-01-05 PROCEDURE — 97162 PT EVAL MOD COMPLEX 30 MIN: CPT

## 2023-01-05 PROCEDURE — 80069 RENAL FUNCTION PANEL: CPT | Performed by: INTERNAL MEDICINE

## 2023-01-05 PROCEDURE — 5A1D70Z PERFORMANCE OF URINARY FILTRATION, INTERMITTENT, LESS THAN 6 HOURS PER DAY: ICD-10-PCS | Performed by: INTERNAL MEDICINE

## 2023-01-05 PROCEDURE — 82330 ASSAY OF CALCIUM: CPT | Performed by: INTERNAL MEDICINE

## 2023-01-05 PROCEDURE — 80048 BASIC METABOLIC PNL TOTAL CA: CPT | Performed by: STUDENT IN AN ORGANIZED HEALTH CARE EDUCATION/TRAINING PROGRAM

## 2023-01-05 PROCEDURE — 83735 ASSAY OF MAGNESIUM: CPT | Performed by: STUDENT IN AN ORGANIZED HEALTH CARE EDUCATION/TRAINING PROGRAM

## 2023-01-05 PROCEDURE — 85025 COMPLETE CBC W/AUTO DIFF WBC: CPT | Performed by: STUDENT IN AN ORGANIZED HEALTH CARE EDUCATION/TRAINING PROGRAM

## 2023-01-05 PROCEDURE — 25010000002 ONDANSETRON PER 1 MG: Performed by: NURSE PRACTITIONER

## 2023-01-05 PROCEDURE — 36415 COLL VENOUS BLD VENIPUNCTURE: CPT | Performed by: STUDENT IN AN ORGANIZED HEALTH CARE EDUCATION/TRAINING PROGRAM

## 2023-01-05 PROCEDURE — 87340 HEPATITIS B SURFACE AG IA: CPT | Performed by: INTERNAL MEDICINE

## 2023-01-05 PROCEDURE — 25010000002 EPOETIN ALFA-EPBX 10000 UNIT/ML SOLUTION: Performed by: INTERNAL MEDICINE

## 2023-01-05 PROCEDURE — 97530 THERAPEUTIC ACTIVITIES: CPT

## 2023-01-05 PROCEDURE — 25010000002 HEPARIN (PORCINE) PER 1000 UNITS: Performed by: STUDENT IN AN ORGANIZED HEALTH CARE EDUCATION/TRAINING PROGRAM

## 2023-01-05 RX ORDER — MIDODRINE HYDROCHLORIDE 5 MG/1
5 TABLET ORAL
Status: DISCONTINUED | OUTPATIENT
Start: 2023-01-05 | End: 2023-01-06 | Stop reason: HOSPADM

## 2023-01-05 RX ORDER — HEPARIN SODIUM 1000 [USP'U]/ML
2000 INJECTION, SOLUTION INTRAVENOUS; SUBCUTANEOUS ONCE
Status: DISCONTINUED | OUTPATIENT
Start: 2023-01-05 | End: 2023-01-06 | Stop reason: HOSPADM

## 2023-01-05 RX ORDER — HEPARIN SODIUM 5000 [USP'U]/ML
5000 INJECTION, SOLUTION INTRAVENOUS; SUBCUTANEOUS EVERY 8 HOURS SCHEDULED
Status: DISCONTINUED | OUTPATIENT
Start: 2023-01-05 | End: 2023-01-06 | Stop reason: HOSPADM

## 2023-01-05 RX ADMIN — GABAPENTIN 300 MG: 300 CAPSULE ORAL at 20:53

## 2023-01-05 RX ADMIN — ATORVASTATIN CALCIUM 40 MG: 20 TABLET, FILM COATED ORAL at 20:53

## 2023-01-05 RX ADMIN — HEPARIN SODIUM 5000 UNITS: 5000 INJECTION INTRAVENOUS; SUBCUTANEOUS at 16:18

## 2023-01-05 RX ADMIN — FOLIC ACID 1000 MCG: 1 TABLET ORAL at 16:16

## 2023-01-05 RX ADMIN — MIDODRINE HYDROCHLORIDE 5 MG: 5 TABLET ORAL at 16:17

## 2023-01-05 RX ADMIN — SERTRALINE HYDROCHLORIDE 150 MG: 50 TABLET, FILM COATED ORAL at 16:15

## 2023-01-05 RX ADMIN — Medication 1000 MCG: at 16:16

## 2023-01-05 RX ADMIN — CEFTRIAXONE SODIUM 1 G: 1 INJECTION, POWDER, FOR SOLUTION INTRAMUSCULAR; INTRAVENOUS at 20:59

## 2023-01-05 RX ADMIN — EPOETIN ALFA-EPBX 20000 UNITS: 10000 INJECTION, SOLUTION INTRAVENOUS; SUBCUTANEOUS at 10:55

## 2023-01-05 RX ADMIN — Medication 10 ML: at 20:53

## 2023-01-05 RX ADMIN — ONDANSETRON 4 MG: 2 INJECTION INTRAMUSCULAR; INTRAVENOUS at 16:46

## 2023-01-05 RX ADMIN — HEPARIN SODIUM 5000 UNITS: 5000 INJECTION INTRAVENOUS; SUBCUTANEOUS at 20:59

## 2023-01-05 NOTE — PLAN OF CARE
Goal Outcome Evaluation:              Outcome Evaluation: Patient a/o x4, NSR on monitor, 2L o2 and cpap at sleep. Pt recieved her 2nd unit of prbc on this shift and tolerated well. Rocephin was administered as ordered once prbc were complete. Meds given as ordered. Pt has minimul urine output that is dark in color.  No new issues noted. See v/s and labs.

## 2023-01-05 NOTE — PROGRESS NOTES
Clinically improving.  Midodrine added for blood pressure support to allow for ultrafiltration.    -I will continue to monitor closely.  I will place her n.p.o. at midnight and reassess her clinical status in the morning to decide whether valvuloplasty will be as an inpatient tomorrow or we will get scheduled as an outpatient in the next 1 to 2 weeks.

## 2023-01-05 NOTE — PROGRESS NOTES
"    Name: Juana Hall ADMIT: 1/3/2023   : 1960  PCP: Kiana Gramajo APRN    MRN: [id number] LOS: 1 days   AGE/SEX: 62 y.o. female  ROOM: 126/1     Subjective   Subjective   Resting in bed, she reports all of her LUTS have resolved and she feels \"great.\" Her hemoglobin has improved to 9. Denies fevers, chills, abdominal pain, dysuria.       Objective   Objective   Vital Signs  Temp:  [98.3 °F (36.8 °C)-98.9 °F (37.2 °C)] 98.7 °F (37.1 °C)  Heart Rate:  [54-60] 60  Resp:  [16-18] 16  BP: ()/(39-77) 93/45  SpO2:  [98 %-100 %] 99 %  on  Flow (L/min):  [2] 2;   Device (Oxygen Therapy): nasal cannula  Body mass index is 57.42 kg/m².  Physical Exam  Constitutional:       Appearance: She is obese. She is ill-appearing.   HENT:      Head: Normocephalic and atraumatic.   Cardiovascular:      Rate and Rhythm: Normal rate and regular rhythm.      Heart sounds: Murmur (3/6 systolic) heard.   Pulmonary:      Effort: Pulmonary effort is normal.      Breath sounds: Normal breath sounds.   Abdominal:      General: Abdomen is flat. There is no distension.      Palpations: Abdomen is soft.      Tenderness: There is no abdominal tenderness.   Musculoskeletal:      Comments: LUE AVF   Skin:     General: Skin is warm and dry.   Neurological:      General: No focal deficit present.      Mental Status: She is alert and oriented to person, place, and time.         Results Review     I reviewed the patient's new clinical results.  Results from last 7 days   Lab Units 23  0602 23  0650 23  2317   WBC 10*3/mm3 3.92 3.84 4.61   HEMOGLOBIN g/dL 9.1* 7.0* 7.8*   PLATELETS 10*3/mm3 100* 90* 98*     Results from last 7 days   Lab Units 23  0602 23  0650 23  2317   SODIUM mmol/L 139  131* 135* 134*   POTASSIUM mmol/L 3.2*  3.1* 2.8* 3.0*   CHLORIDE mmol/L 99  95* 94* 97*   CO2 mmol/L 25.2  25.1 27.8 29.0   BUN mg/dL 35*  36* 27* 25*   CREATININE mg/dL 5.65*  6.33* 5.29* 4.98* "   GLUCOSE mg/dL 88  90 194* 201*   Estimated Creatinine Clearance: 14.6 mL/min (A) (by C-G formula based on SCr of 6.33 mg/dL (H)).  Results from last 7 days   Lab Units 01/05/23  0602 01/03/23  2317   ALBUMIN g/dL 2.3* 2.7*   BILIRUBIN mg/dL  --  0.6   ALK PHOS U/L  --  80   AST (SGOT) U/L  --  12   ALT (SGPT) U/L  --  6     Results from last 7 days   Lab Units 01/05/23  0602 01/04/23  0650 01/03/23  2317   CALCIUM mg/dL 8.0*  8.2* 7.8* 8.3*   ALBUMIN g/dL 2.3*  --  2.7*   MAGNESIUM mg/dL 1.9  --  2.0   PHOSPHORUS mg/dL 4.3  --   --      Results from last 7 days   Lab Units 01/04/23  0650 01/04/23  0059 01/03/23  2317   PROCALCITONIN ng/mL  --   --  1.25*   LACTATE mmol/L 0.8 0.9  --      COVID19   Date Value Ref Range Status   01/04/2023 Not Detected Not Detected - Ref. Range Final   08/20/2022 Not Detected Not Detected - Ref. Range Final     Hemoglobin A1C   Date/Time Value Ref Range Status   01/04/2023 0650 5.70 (H) 4.80 - 5.60 % Final     Glucose   Date/Time Value Ref Range Status   01/05/2023 0750 91 70 - 130 mg/dL Final     Comment:     Meter: [id number] : [id number] Gabriella GAMING   01/04/2023 2059 134 (H) 70 - 130 mg/dL Final     Comment:     Meter: [id number] : [id number] [facility]   01/04/2023 1632 178 (H) 70 - 130 mg/dL Final     Comment:     Meter: [id number] : [id number] Tamiko Escalera CNA   01/04/2023 1114 220 (H) 70 - 130 mg/dL Final     Comment:     Meter: [id number] : [id number] Tamiko LEONA   01/04/2023 0754 193 (H) 70 - 130 mg/dL Final     Comment:     Meter: [id number] : [id number] Michael Senior RN       NM PET/CT Skull Base to Mid Thigh  Narrative: F-18 FDG PET FROM SKULL BASE TO MID THIGH WITH PET/CT FUSION     HISTORY: Metastatic breast cancer, restaging     TECHNIQUE: Radiation dose reduction techniques were utilized, including  automated exposure control and exposure modulation based on body size.   Blood glucose level at time of injection was 140 mg/dL.   6.1 mCi of F-18  FDG were injected and PET was performed from skull base to mid thigh. CT  was obtained for localization and attenuation correction. Time at  injection 0915. PET start time 1042.      Compared with multiple PET CTs dating back to 04/19/2022     FINDINGS:      Evaluation is suboptimal due to patient body habitus. No cervical  adenopathy demonstrating uptake significantly above that of mediastinal  blood pool is present. The thyroid, submandibular and parotid glands  demonstrate roughly symmetric FDG uptake.     No hilar, mediastinal or left axillary adenopathy demonstrating uptake  significantly above that of mediastinal blood pool is present.     There is a large, mass-like area of uptake overlying the right axilla  and operative bed of the right mastectomy which is new since 10/06/2022.  It demonstrates a max SUV of 39.3. Of note, the patient was injected in  the right forearm with radiotracer and linear uptake courses directly  into this area. No new measurable soft tissue mass or adenopathy is  visualized on noncontrast CT.     Postsurgical changes from right mastectomy are present. A loculated  fluid collection within the right mastectomy bed measures 3.1 x 3.2 cm  (previously 4.8 x 4.1 cm on 10/06/2022).     No significant pericardial effusion is present. No pulmonary  consolidation, pleural effusion or pneumothorax. Sub-6 mm pulmonary  nodule within left lower lobe is present, as before.     No free intraperitoneal air is seen. Scattered FDG avid osseous lesions  are present, as before, with index lesions as below:  *  Previously seen focal FDG avid lesion along the anterior aspect of  the left acetabulum demonstrates a max SUV of 5.4 (previously 7.9 on  10/06/2022).  *  Focal uptake within the left distal clavicle demonstrates a max SUV  of 3.1 (previously 6.9).  *  Increased uptake within the C7 vertebral body demonstrates a max SUV  of 3.5 (previously 5.6).     No abdominopelvic adenopathy  demonstrating uptake significantly above  that of mediastinal blood pool. Prominent retroperitoneal nodes are  present measuring up to 0.9 cm, grossly unchanged since 10/06/2022. The  appendix is unremarkable. No focal FDG avid abnormality is present  within the gallbladder, pancreas, spleen, adrenal glands or kidneys. A  left nephroureteral stent is in place with ill-defined soft tissue  thickening and stranding surrounding the left collecting system, as  before.     Impression: 1.  Large mass-like area of uptake overlying the right axilla and right  mastectomy operative bed without a visualized abnormality in this  location on noncontrast CT. Of note, the patient was injected with  radiotracer in the right forearm with presumed vascular radiotracer  uptake seen coursing directly into this area. Given the absence of  abnormality seen on noncontrast CT, findings are favored to be related  to injection. However, correlation with physical exam in this area is  recommended to exclude a new palpable abnormality and the possibility of  malignancy.  2.  Scattered focal FDG avid osseous lesions with index lesions which  have decreased in degree of FDG uptake.  3.  Other findings as above.     The above findings were discussed with Dr. Irvin by telephone by Ward Conde on 01/03/2023     This report was finalized on 1/5/2023 12:54 PM by Dr. Ward Conde M.D.       Scheduled Medications  Armodafinil, 250 mg, Oral, Daily  atorvastatin, 40 mg, Oral, Nightly  cefTRIAXone, 1 g, Intravenous, Q24H  epoetin jana/jana-epbx, 20,000 Units, Intravenous, Once per day on Mon Wed Fri  folic acid, 1,000 mcg, Oral, Daily  gabapentin, 300 mg, Oral, Nightly  heparin (porcine), 5,000 Units, Subcutaneous, Q8H  heparin (porcine), 2,000 Units, Intracatheter, Once  insulin glargine, 25 Units, Subcutaneous, QAM  insulin lispro, 0-7 Units, Subcutaneous, TID AC  levothyroxine, 50 mcg, Oral, Daily  midodrine, 5 mg, Oral, TID AC  sertraline, 150  mg, Oral, Daily  sodium chloride, 10 mL, Intravenous, Q12H  vitamin B-12, 1,000 mcg, Oral, Daily    Infusions   Diet  Diet: Diabetic Diets; Consistent Carbohydrate; Texture: Regular Texture (IDDSI 7); Fluid Consistency: Thin (IDDSI 0)         I have personally reviewed:  [x]  Laboratory   []  Microbiology   []  Radiology   [x]  EKG/Telemetry   []  Cardiology/Vascular   []  Pathology   []  Records    Assessment/Plan     Active Hospital Problems    Diagnosis  POA   • **Acute UTI (urinary tract infection) [N39.0]  Unknown   • Diastolic CHF, chronic (HCC) [I50.32]  Unknown   • Adverse effect of chemotherapy [T45.1X5A]  Unknown   • Hypokalemia [E87.6]  Unknown   • Severe aortic stenosis [I35.0]  Unknown   • Depression [F32.A]  Unknown   • ANDREW (obstructive sleep apnea) [G47.33]  Unknown   • ESRD (end stage renal disease) ([facility]) [N18.6]  Yes   • Morbid obesity with BMI of 50.0-59.9, adult ([facility]) [E66.01, Z68.43]  Not Applicable   • Metastatic breast cancer ([facility]) [C50.919]  Yes   • Normocytic anemia [D64.9]  Yes   • Diabetes type 2, controlled ([facility]) [E11.9]  Yes   • Hyperlipidemia [E78.5]  Yes   • Hypertension [I10]  Yes      Resolved Hospital Problems   No resolved problems to display.       Ms. Hall is a 62 y.o. non-smoker with a history of metastatic breast cancer, ESRD on hemodialysis, anemia of chronic disease, type 2 diabetes, combined systolic/diastolic CHF, severe aortic stenosis, peripheral neuropathy who presents with malaise/weakness/LUTS and is found to have a UTI with worsening anemia.     Acute UTI  - UA c/w UTI w/ + nitrites, large LE, 4+ bacteria, many WBC; procal 1.25  - cx w/ GNB >100k  - pt reports complete cessation of LUTS  - continue ceftriaxone, f/u on urine cultures     ESRD on home HD  Hypokalemia- correct w/ HD  - nephrology consulted  - patient does home hemodialysis 5x/week  - TuThSat scheduled planned for inpatient  - resume high dose epogen  - f/u am labs     Acute on chronic anemia of chronic  disease/underlying malignancy/myelosuppression from chemo   -anemia multifactorial as above  - hgb 7.8, down-trended to 7.0 1/4  - transfused 2U PRBC on 1/4 w/ improvement to 9.0     Type 2 diabetes mellitus  - continue basal/bolus dosing of insulin  - adjust as needed  - A1c 5.7%     Combined diastolic CHF  Severe aortic stenosis  - echo as above  - coreg  - Cardiology considering valvuloplasty next week vs valvuloplasty     Peripheral neuropathy  - home gabapentin- monitor for toxicity     Hypothyroidism  - levothyroxine     Metastatic lobular breast cancer  - followed by Dr. Irvin  - metastatic to bone, omentum  - defer resuming chemo to heme/omc     Depression  - sertraline, armodafinil      ANDREW  - ok for home CPAP    · Heparin SC for DVT prophylaxis- added  · Full code.  · Discussed with patient, nursing staff and consulting provider.  · Anticipate discharge to be determined  pending PT consult    Lorena Harden MD  [facility]  01/05/23  14:13 EST     I wore protective equipment throughout this patient encounter including a face mask, gloves and protective eyewear.  Hand hygiene was performed before donning protective equipment and after removal when leaving the room.

## 2023-01-05 NOTE — PROGRESS NOTES
[facility] INPATIENT PROGRESS NOTE  Subjective     CC:Metastatic lobular breast cancer (ER/OK positive, HER-2/tj negative), anemia secondary to CKD3, CHF, peripheral neuropathy, chemotherapy related nausea chemotherapy-induced myelosuppression        HISTORY OF PRESENT ILLNESS:  Juana Hall is a 62 y.o. female who we are asked to see today in consultation for her history of metastatic lobular carcinoma.  Her treatment for this includes Faslodex, Verzenio that has been increased in October though with associated anemia and was assessed 12/8/2022 with plans to undergo a follow-up PET/CT in 3 weeks.  This PET/CT 12/29/2022 showed large masslike uptake overlying the right axilla right mastectomy operative bed poss related to injection, scattered focal FDG avid lesions per osseous assessment with the majority demonstrating degree of avidity decreased from previous.  The patient has had additional issues with anemia secondary end-stage renal disease, her malignancy/chronic disease and myelosuppression from CDK 4/6 inhibitor and GI blood loss with hemoglobin approximately 7 g% requiring transfusion when she is at this level.  She was transfused 12/9/2022 she continues on home dialysis 5 days/week x 2 hours and is also followed by cardiology with CHF and aortic stenosis but without plans for TAVR considering her her life expectancy.  Please note, additionally, she has peripheral neuropathy in bilateral lower extremities and left upper, nausea, issues with depression, left perinephric stranding secondary malignancy and hydronephrosis stable, right thyroid nodules stable, previous issues with hypocalcemia and was hospitalized in late January 2022 secondary to COVID.     She presented to the ER 1/3/2022 with generalized weakness over the last several weeks, potential urinary tract infection and underwent a number of studies demonstrating abnormal urinalysis, culture pending, blood cultures pending,  BUN/creatinine of 27 and 5.29, calcium 7.8, H&H of 7.0 and 20.7.  Patient given ceftriaxone in emergency room.     Interval history:     1/4/2023  T98.1, pulse 60, respirations 18, /67, weight 368 pounds, H&H 7.0 and 20.7, platelet count of 90,000  Patient is seen in observation unit and we have discussed her issues to date including her PET/CT findings which are improved for bony findings with an artifact in the right chest that would be consistent with her previous radiation site in artifactual uptake.  The patient and her  are frustrated about whether TAVR recommendations have been completed with her symptoms currently exacerbated by her significant anemia.    1/5/2023  T98.7, pulse 60, respirations 16, BP 93/45  Patient feeling considerably improved in general, now being transported for dialysis  H&H 9.1 and 27.8, white count of 3920, platelet count of 100,000, BUN/creatinine of 35 and 5.65  Patient seen by nephrology with plans to continue dialysis in house, high-dose Epogen with hemodialysis and midodrine for BP support.  Additionally reviewed by the structural heart team and not felt to be a candidate for TAVR immediately though balloon aortic valvuloplasty could potentially be helpful.       Medications:  The current medication list was reviewed in the EMR.    Allergies:  No Known Allergies    Objective      Vitals:    01/05/23 0748   BP: 93/45   Pulse: 60   Resp: 16   Temp: 98.7 °F (37.1 °C)   SpO2: 99%        Physical exam:   GENERAL:  Well-developed, well-nourished, morbidly obese in no acute distress.   SKIN:  Warm, dry without rashes, purpura or petechiae.  HEAD:  Normocephalic.  EYES:  Pupils equal, round and reactive to light.  EOMs intact.  Conjunctivae normal.  EARS:  Hearing intact.  NOSE:  Septum midline.  No excoriations or nasal discharge.  MOUTH:  Tongue is well-papillated; no stomatitis or ulcers.  Lips normal.  THROAT:  Oropharynx without lesions or exudates.  NECK:  Supple with  good range of motion; no thyromegaly or masses, no JVD.  LYMPHATICS:  No cervical, supraclavicular, axillary or inguinal adenopathy.  CHEST: Radiation dermatitis right upper anterior chest wall, lungs clear to percussion and auscultation. Good airflow.  CARDIAC:  Regular rate and rhythm with 3/6 holosystolic murmur, rubs or gallops. Normal S1,S2.  ABDOMEN:  Soft, nontender with no organomegaly or masses.  EXTREMITIES:  No clubbing, cyanosis but lymphedema present lower extremities associated with her obesity.  NEUROLOGICAL:  Cranial Nerves II-XII grossly intact.  No focal neurological deficits.  PSYCHIATRIC:  Normal affect and mood    RECENT LABS:    Results from last 7 days   Lab Units 01/05/23  0602 01/04/23  0650 01/03/23  2317   WBC 10*3/mm3 3.92 3.84 4.61   HEMOGLOBIN g/dL 9.1* 7.0* 7.8*   HEMATOCRIT % 27.8* 20.7* 24.0*   PLATELETS 10*3/mm3 100* 90* 98*   MONOCYTES % %  --  3.1*  --      Results from last 7 days   Lab Units 01/04/23  0650 01/03/23  2317   SODIUM mmol/L 135* 134*   POTASSIUM mmol/L 2.8* 3.0*   CHLORIDE mmol/L 94* 97*   CO2 mmol/L 27.8 29.0   BUN mg/dL 27* 25*   CREATININE mg/dL 5.29* 4.98*   CALCIUM mg/dL 7.8* 8.3*   BILIRUBIN mg/dL  --  0.6   ALK PHOS U/L  --  80   ALT (SGPT) U/L  --  6   AST (SGOT) U/L  --  12   GLUCOSE mg/dL 194* 201*     Results from last 7 days   Lab Units 01/03/23  2317   MAGNESIUM mg/dL 2.0     This is a 62 y.o. female with:     *Metastatic lobular breast cancer (ER/[place] positive, HER-2/tj negative):  • Previous stage IIIC right breast cancer in 2003, received neoadjuvant chemotherapy (unknown regimen) with subsequent bilateral mastectomies 1/22/2004 with right axillary dissection.  Residual 10-12 cm invasive lobular carcinoma on the right breast with 14/16+ nodes with extranodal extension.  Received adjuvant carboplatin and Navelbine chemotherapy x4 cycles  • Attempted adjuvant radiation to the chest wall however interrupted due to cellulitis that required removal of  implants in August 2004  • Received tamoxifen from 6/22/2004 until June 2009.  Transitioned to Femara and received until June 2014  • Identification of CT findings concerning for carcinomatosis on CT scans and June 2019.  • PET scan confirmed hypermetabolic activity in the omentum as well as small retroperitoneal lymph nodes.  • CT-guided omental biopsy 7/30/2019 with metastatic lobular carcinoma of breast primary, ER positive (greater than 95%), KS positive (90%), HER-2/tj negative (1+ IHC)  • [facility] liquid biopsy 2/5/2020: MSI undetermined, mutations in BETH, NF1, CHEK2.  No evidence of PIK3CA mutation.  • Subsequent [facility] germline testing 11/12/2021 with BETH VUS (c.2864C>A) and POLE VUS (c.631A>T)  • Initiation of Aromasin and Ibrance in August 2019  • Difficulty with myelosuppression related to Ibrance requiring dose alterations, eventually changed to 100 mg for 7 days on followed by 7 days off.  • CT chest abdomen pelvis 10/26/2021 with increase in left hydronephrosis with increase in left perinephric and periureteral stranding. Decrease in stone in the left kidney lower pole (7 mm).  Stable small retroperitoneal lymph and left periaortic lymph nodes.  • PET scan 11/10/2021 with multiple bilateral hypermetabolic nodular pulmonary lesions, asymmetric moderate to severe left hydronephrosis with ill-defined fat stranding and soft tissue thickening in the renal sinus and surrounding the left ureter, hypermetabolic left retroperitoneal lymph node with slight increase in size, ill-defined hypermetabolic thickening in the inferior omentum with increase in size, uptake and C7 (indeterminate, recommended MRI).  Short segments of uptake in the mid and distal esophagus possibly related to gastritis.  • PET scan findings felt to be consistent with progressive malignancy.  Patient declined repeat omental biopsy.   • Options for further treatment discussed on 11/12/2021.  In light of renal failure and  dialysis, options are more limited.  Recommendation to continue Ibrance and discontinue Aromasin, begin Faslodex.  Discussed possible future use of single agent Taxol.    • Aromasin discontinued 11/12/2021  • On 11/22/2021 initiation of Faslodex with continuation of Ibrance (100 mg daily for 7 days on followed by 7 days off).  • MRI cervical spine 11/18/2021 showed the right C7 normality to likely represent metastatic disease.  With solitary bone lesion, did not recommend pursuit of bone modifying agent.  • Following 2 cycles Faslodex/Ibrance, PET scan on 1/11/2022 showed no change in the soft tissue superior to the dome of the bladder with hypermetabolic activity (likely related to carcinomatosis).  Significant decrease in injection of fat around the left renal pelvis and stable retroperitoneal hypermetabolic lymph nodes, decrease in size and activity in small bilateral pulmonary nodules.  Findings felt to represent evidence of response to treatment.    • Ibrance held briefly beginning 1/28/2022 due to hospitalization with COVID-19 infection and C. difficile colitis.  Patient developed leukopenia/neutropenia.  Ibrance resumed at previous dosing (100 mg daily 7 days on followed by 7 days off) on 2/9/22.  • Following 5 cycles Faslodex/Ibrance PET scan 4/19/2022 with evidence of mixed response.  New hypermetabolic lesion spinous process L2 (SUV 5.1) and slight increase in activity left clavicular metastasis (SUV increased 4.6-8.1).  C7 hypermetabolic mixed lytic and blastic bone lesion was unchanged.  Decrease in uptake involving omental thickening.  Stable soft tissue thickening around the left renal pelvis and ureter.  Discussion again regarding potential pursuit of IV chemotherapy with Taxol versus continuation of Ibrance and Faslodex with radiation to sites of bony disease at L2, left clavicle, C7.  Patient opted to defer initiation of systemic chemotherapy and continue Ibrance/Faslodex with consideration of  radiation.  • Initiated monthly Xgeva on 5/19/2022 (cleared with nephrology).  Xgeva subsequently held due to significant hypocalcemia.  Decision made to forego further Xgeva with persistent hypocalcemia.  • Palliative radiation received to lumbar spine regarding L2 metastasis 5/23- 6/7/2022  • Ibrance held during radiation therapy beginning 5/22/2022.  Ibrance resumed 6/16/2022.  • PET scan 7/21/2022 with stable hypermetabolic abdominal pelvic lymph nodes and subcarinal lymph node, stable bone lesions at C7, left clavicle.  Stable soft tissue thickening around the left renal pelvis with left hydronephrosis.  Uptake in adrenal glands felt to be likely reactive (no change in size).  No activity noted at L2 (previously radiated).  New hypermetabolic lesion right anterior sixth rib.  • With evidence of further slight progression in bone on PET scan (new right sixth rib lesion), on 7/28/2022 discontinued Ibrance and plan to transition to abemaciclib with continuation of Faslodex.    • On 8/4/2022 initiated abemaciclib at reduced dose of 50 mg twice daily.  • On 8/25/2022, increased abemaciclib dose to 100 mg twice daily  • PET scan 10/6/2022 with stable findings with exception of left acetabular activity report noted new activity however on prior PET scan question of focal activity present previously.  No corresponding CT abnormality and significant increase in SUV at sacral lesion.  Scan otherwise stable.  Decision to continue current treatment  • Abemaciclib dose increased on 10/13/2022 up to 150 mg twice daily  • Patient returns today in follow-up due for cycle 14 Faslodex 500 mg IM every 4 weeks and continuing on abemaciclib 150 mg twice daily that was initiated 8/22 at 50 mg daily with dose increase on 8/25/2022 to 100 mg twice daily, and further increase to 150 mg twice daily on 10/13/2022.  Patient is tolerating the dose increase well with no overt changes in her stool patterns.  She is currently having  significant more fatigue with dropping hemoglobin as further discussed below.  We will proceed with transfusion.  Otherwise she will undergo repeat PET scan in 3 weeks and then follow-up with Dr. Irvin in 4 weeks for scan review and cycle 15 Faslodex pending scan results  • Subsequent PET/CT with masslike area overlying the right axilla?  Injection site, reduction in avid bony lesions left acetabulum, left distal clavicle, and C7 vertebral body, no additional other findings compared to previous.  • Patient seen in observation unit 1/4/2023 which would be day of treatment, plan to reschedule at 2 weeks with Dr. Irvin, Faslodex     *Anemia secondary to ESRD, underlying malignancy/chronic disease, myelosuppression from CDK 4/6 inhibitor, GI blood loss:  • Anemia secondary to ESRD, malignancy with exacerbation by use of Ibrance  • Previous evidence of folate deficiency 8/12/2019 with level 3.84, initiated folic acid 1 mg daily  • Previous evidence of iron deficiency, received Injectafer on 8/7/2020 750 mg IV x1 dose.  Second dose not administered due to onset of fever, subsequent iron studies precluded further administration of IV iron.  • Previous low normal B12 level initiated oral B12 1000 mcg daily.  • Patient had previously received Procrit and required intermittent transfusion support  • Following initiation of hemodialysis, management of BEAU transitioned to nephrology, receiving Epogen with dialysis.  • Ongoing transfusion support prompted alteration in Ibrance dosing on 8/23/2021.  • After alteration in Ibrance dosing, hemoglobin improved and remained stable in the 9-10 range.  • Improved but ongoing intermittent need for transfusion support despite Ibrance dose alteration  • Stool negative for occult blood x3 on 11/2/2021  • Patient with reduced dialysis frequency to 2 days/week with concurrent decrease in Epogen dosing corresponding again to increased transfusion requirement.  • Epogen dose increased per  nephrology to 20,000 units twice weekly with dialysis on Mondays and Fridays  • Ibrance again exacerbating anemia with ongoing intermittent transfusion requirements.  Ibrance subsequently discontinued on 7/28/2022 and patient initiated abemaciclib on 8/4/2022 which may be less myelosuppressive.  • Additional transfusions required with hemoglobin on 3/31/2022 6.6, hemoglobin 4/21/2022 of 6.4, hemoglobin on 5/5/2022 of 6.8, hemoglobin 6.4 on 7/14/2022, hemoglobin 6.4 on 7/28/2022, hemoglobin 6.8 on 8/18/2022.  • Patient did develop transient visible blood in stool in July 2022  • Anemia evaluation 7/28/2022 with iron 32, ferritin 412, iron saturation 15%, TIBC 210, folate 19.9, B12 925, haptoglobin 300, .  • Stool positive for occult blood x3 on 8/1/2022  • Patient was seen by GI on 8/16/2022, felt to be high risk for endoscopic evaluation and elected to forego EGD/colonoscopy for now  • Patient transitioned from hemodialysis in clinic to home hemodialysis 5 days/week x 2 hours beginning 8/29/2022.  With transition to home dialysis, nephrology transition the patient from Epogen to Mircera administered every 4 weeks in their office, initiated 8/22/2022.  • Patient currently continues Mircera every 4 weeks.  She did need a blood transfusion after hemoglobin on 10/27/2022 was 6.6.  • Hemoglobin today 7.1.  Patient is symptomatic with extreme fatigue today, requesting to go ahead with transfusion.  • Patient seen in observation 1/4/2022 after admission with weakness, hemoglobin 7.0, medic at 20.7, 2 unit transfusion anticipated  • Patient improved post transfusion H&H of 9.1 and 27.8, hemodialysis to continue with high-dose Epogen     *ESRD on HD:  • Patient with previous CKD3/4  • Hospitalization 4/29-5/4/2021 with RYAN/CKD, UTI, anemia.  Required initiation of hemodialysis Tuesday Thursday Saturday.   • Decrease in frequency of dialysis to twice per week.  She continues to produce a significant amount of urine.    • Status post left upper extremity AV fistula placement on 11/3/2021 by vascular surgery  • Attempt to hold further dialysis on 1/11/2022 with close monitoring of her laboratory and clinical parameters.  Dialysis quickly resumed due to development of hyperkalemia.  • Patient was undergoing dialysis 2 days/week on Mondays and Fridays.    • On 8/22/2022 patient transitioned to use of home dialysis device 5 days/week x 2 hours.  • During observation stay dialysis continues through nephrology     *CHF with severe aortic stenosis:  • Echocardiogram 7/12/2019 showing ejection fraction 48% with moderate dilation of the LV cavity, global hypokinesis, grade 2 diastolic dysfunction, mild to moderate aortic stenosis, trivial pericardial effusion.  • Echocardiogram 7/19/2021 with ejection fraction 56%, left atrial volume moderately increased, grade 2 diastolic dysfunction, severe calcification aortic valve, moderate aortic stenosis, severe mitral calcification, mild mitral stenosis, marked elevation in RVSP from tricuspid regurgitation.  • Echocardiogram on 7/13/2022 with ejection fraction 56-60%, grade 2 diastolic dysfunction, severe aortic stenosis.    • Cardiac catheterization 8/23/2022 showed normal coronary arteries, mild to moderate pulmonary hypertension.    • She was evaluated by cardiothoracic surgery Dr. Pagan and there was discussion regarding potential candidacy for TAVR although there was concern given her underlying comorbidities including her metastatic breast cancer.  I discussed patient's prognosis with Dr. Pagan.  She does have opportunities for additional treatment of her malignancy with intravenous chemotherapy and is willing to pursue this in the future when needed.  It is unclear as to her expected survival however given her multiple severe comorbidities with metastatic breast cancer, ESRD on dialysis, severe aortic stenosis, severe anemia.  There is consideration of possible pursuit of balloon  valvuloplasty initially to assess her symptomatic response and then consider pursuit of TAVR in the future.   • Patient is awaiting further decision from cardiology as to recommendations for further management.  She was seen on 10/25/2022 but reports she was told that her life expectancy is not long enough for procedure.  She will follow-up in January 2023 and discussed once again.  • Plan to request this assessment during patient's observation stay  • Patient reviewed by Dr. Rivers-potential aortic valvuloplasty to be considered     *Left upper and bilateral lower extremity peripheral neuropathy:  • Patient reports that left upper extremity neuropathy was not present from previous chemotherapy but occurred after hospitalization that required intubation and critical care stay.  Apparently felt to represent critical care neuropathy.  Improved over time.  • Bilateral lower extremity neuropathy felt to be related to diabetes  • May have future implications regarding treatment for breast cancer.  • Patient reports that peripheral neuropathy symptoms continue in the bilateral lower extremities, unchanged.  This will be a consideration with potential future use of Taxol     *Uterine/endometrial activity on PET scan:  • Patient experienced postmenopausal vaginal bleeding in 2013, negative endometrial biopsy and gynecologic evaluation.  • With findings on PET scan with enlarging uterus and some hypermetabolic activity, referred back to Dr. Oliveros in gynecology.    • 9/19/2019 D&C with hysteroscopy by Dr. Oliveros, no significant intraoperative findings, pathologic results with benign findings, no evidence of malignancy.     *Nausea:  • Mild, relieved with Zofran.   • Patient experienced improvement in nausea after initiating hemodialysis  • No recent nausea     *Myelosuppression secondary to CDK 4/6 inhibitor:  • Patient was able to maintain adequate WBC after dosing alteration on Ibrance to treatment at 100 mg daily for 7  days on followed by 7 days off.  • Subsequent transition from Ibrance to abemaciclib  • Today, WBC 2.97.  Stable  • Assessment 1/4/2022 white count of 3920     *Depression  • Patient with history of depression, worsened after the death of her son in November 2021  • With evidence of progressive malignancy in November 2021, patient agreeable to referral to supportive oncology  • Patient currently receiving gabapentin 300 mg nightly, Zoloft 150 mg daily, armodafinil 250 mg daily  • Patient does continue with difficulties regarding depression that wax and wane.  Currently symptoms under fair control.  Patient last seen by supportive oncology on 9/26/2022.     *Left perinephric stranding secondary to malignancy with hydronephrosis:  • CT 4/22/2021 showed interval enlargement of 2 staghorn calculi in the left kidney with persistent left perinephric stranding  • Hospitalization 4/29-5/4/2021 with RYAN/CKD, UTI, anemia.  Required 2 unit PRBC transfusion and initiated hemodialysis Tuesday Thursday Saturday.    • Discussion from urology regarding potential need for surgical procedure to address staghorn calculus left kidney  • CT scan 7/2/2021 with only 1 remaining left renal stone identified which had decreased in size.  Patient appears to have passed the other stone.  • As above, CT 10/26/2021 with more prominent left hydronephrosis and increase in left perinephric and periureteral stranding.  Felt to possibly be related to passage of recent stone versus partial obstruction secondary to debris or thrombus in the left ureter versus pyelonephritis.  Patient however with no clinical evidence of recent stone passage nor pyelonephritis. Malignancy felt to be the cause of CT changes around the left kidney at this point.   • Left ureteral stent replacement 12/16/2021  • PET scan 1/11/2022 with decrease in fat injection near left renal pelvis, stent in satisfactory position.  Mild residual hydronephrosis on the left.  • Ureteral  stent replaced on 3/10/2022  • PET scan 4/19/2022 with no hydronephrosis, left ureteral stent in place  • PET scan 7/21/2022 with persistent left hydronephrosis, ureteral stent in place  • PET scan 10/6/2022 with left nephroureteral stent in place, overall stable findings  • UTI documented 1/3/2023, empiric Rocephin given with improved symptoms by 1/5/2023     *Right thyroid nodules  • Patient underwent prior thyroid biopsy by her report with benign findings many years ago, records not available  • On MRI cervical spine 11/18/2021, finding of 1.8 cm right thyroid nodule  • Thyroid ultrasound 11/26/2021 with right-sided thyroid nodules, 1 nodule was hypoechoic, solid measuring 2 cm with biopsy recommended.  • Thyroid ultrasound results reviewed with patient.  In light of her metastatic breast cancer, renal failure the significance of the thyroid nodule is felt to be much less.  We discussed possibility of thyroid biopsy however patient is inclined to forego this, especially since she has had a biopsy performed in the past with benign findings by her report.    • No hypermetabolic activity identified on PET scan 1/11/2022 in the neck     *Hospitalization 1/28-1/30/2022 due to COVID-19 infection and C. difficile colitis  • Patient fully vaccinated with 3 doses Moderna COVID-19 vaccine  • Patient developed symptoms 1/26/2022 with abrupt onset sinus congestion, mild cough, subsequently developed nausea and diarrhea on 1/27/2022.  Significant fatigue.  • Patient tested positive in emergency department on 1/28/2022 and was admitted  • Patient maintained O2 saturation in mid 90% range on room air  • Patient received remdesivir  • Patient was also found to be positive for C. difficile colitis, received course of oral vancomycin.  • Symptoms from both COVID-19 and C. difficile colitis ultimately resolved.     *Hypocalcemia  • Patient developed significant hypocalcemia after receiving Xgeva on 5/19/2022  • Calcium management  per nephrology  • No further Xgeva planned due to persistent significant hypocalcemia  • Calcium 8.0           Tarik Diaz MD

## 2023-01-05 NOTE — PROGRESS NOTES
LOS: 1 day     Chief Complaint/ Reason for encounter: ESRD, dialysis management    Subjective   01/05/23 : Patient is doing well today with no new complaints  Good appetite with no nausea or vomiting  No shortness of breath chest pain or edema  Voiding well with no dysuria   she feels much better today  Weakness much improved    Medical history reviewed:  History of Present Illness    Subjective    History taken from: Patient and chart    Vital Signs  Temp:  [98.3 °F (36.8 °C)-98.9 °F (37.2 °C)] 98.7 °F (37.1 °C)  Heart Rate:  [54-60] 60  Resp:  [16-18] 16  BP: ()/(34-77) 93/45       Wt Readings from Last 1 Encounters:   01/04/23 0838 (!) 167 kg (368 lb 11.2 oz)   01/03/23 2245 (!) 165 kg (363 lb 12.1 oz)       Objective    Objective:  General Appearance:  Comfortable, obese, chronically ill-appearing, in no acute distress and not in pain.  Awake, alert, oriented  HEENT: Mucous membranes moist, no injury, oropharynx clear  Lungs:  Normal effort and normal respiratory rate.  Breath sounds clear to auscultation.  No  respiratory distress.  No rales, decreased breath sounds or rhonchi.    Heart: Normal rate.  Regular rhythm.  S1, S2 normal.  No murmur.   Abdomen: Abdomen is soft.  Bowel sounds are normal, no abdominal tenderness.  There is no rebound or guarding  Extremities: Trace ankle edema of bilateral lower extremities  Neurological: No focal motor or sensory deficits, pupils reactive  Skin:  Warm and dry.  No rash or cyanosis.       Results Review:    Intake/Output:     Intake/Output Summary (Last 24 hours) at 1/5/2023 0905  Last data filed at 1/4/2023 2145  Gross per 24 hour   Intake 716.67 ml   Output --   Net 716.67 ml         DATA:  Radiology and Labs:  The following labs independently reviewed by me. Additional labs ordered for tomorrow a.m.  Interval notes, chart personally reviewed by me.   Old records independently reviewed showing ESRD, chronic anemia requiring frequent transfusions related to  her chemotherapy  The following radiologic studies independently viewed by me, findings chest x-ray showed mild pulmonary edema  New problems include hypokalemia  Discussed with patient    Risk/ complexity of medical care/ medical decision making moderate complexity, dialysis management    Labs:   Recent Results (from the past 24 hour(s))   POC Glucose Once    Collection Time: 01/04/23 11:14 AM    Specimen: Blood   Result Value Ref Range    Glucose 220 (H) 70 - 130 mg/dL   Type & Screen    Collection Time: 01/04/23 12:10 PM    Specimen: Blood   Result Value Ref Range    ABO Type A     RH type Positive     Antibody Screen Positive     T&S Expiration Date 1/7/2023 11:59:59 PM    Antibody Identification    Collection Time: 01/04/23 12:10 PM    Specimen: Blood   Result Value Ref Range    Anti-E ANTI-E    Direct Antiglobulin Test (Direct Rose)    Collection Time: 01/04/23 12:10 PM    Specimen: Blood   Result Value Ref Range    LAURITA Negative    POC Glucose Once    Collection Time: 01/04/23  4:32 PM    Specimen: Blood   Result Value Ref Range    Glucose 178 (H) 70 - 130 mg/dL   POC Glucose Once    Collection Time: 01/04/23  8:59 PM    Specimen: Blood   Result Value Ref Range    Glucose 134 (H) 70 - 130 mg/dL   Prepare RBC, 2 Units    Collection Time: 01/05/23  6:00 AM   Result Value Ref Range    Product Code M2668P97     Unit Number [id number]     UNIT  ABO A     UNIT  RH POS     Crossmatch Interpretation Compatible     Dispense Status IS     Blood Expiration Date 202301262359     Blood Type Barcode 6200     Product Code G6660Z85     Unit Number [id number]     UNIT  ABO A     UNIT  RH POS     Crossmatch Interpretation Compatible     Dispense Status PT     Blood Expiration Date 202301232359     Blood Type Barcode 6200    Magnesium    Collection Time: 01/05/23  6:02 AM    Specimen: Blood   Result Value Ref Range    Magnesium 1.9 1.6 - 2.4 mg/dL   Renal Function Panel    Collection Time: 01/05/23  6:02 AM     Specimen: Blood   Result Value Ref Range    Glucose 90 65 - 99 mg/dL    BUN 36 (H) 8 - 23 mg/dL    Creatinine 6.33 (H) 0.57 - 1.00 mg/dL    Sodium 131 (L) 136 - 145 mmol/L    Potassium 3.1 (L) 3.5 - 5.2 mmol/L    Chloride 95 (L) 98 - 107 mmol/L    CO2 25.1 22.0 - 29.0 mmol/L    Calcium 8.2 (L) 8.6 - 10.5 mg/dL    Albumin 2.3 (L) 3.5 - 5.2 g/dL    Phosphorus 4.3 2.5 - 4.5 mg/dL    Anion Gap 10.9 5.0 - 15.0 mmol/L    BUN/Creatinine Ratio 5.7 (L) 7.0 - 25.0    eGFR 6.9 (L) >60.0 mL/min/1.73   CBC Auto Differential    Collection Time: 01/05/23  6:02 AM    Specimen: Blood   Result Value Ref Range    WBC 3.92 3.40 - 10.80 10*3/mm3    RBC 2.96 (L) 3.77 - 5.28 10*6/mm3    Hemoglobin 9.1 (L) 12.0 - 15.9 g/dL    Hematocrit 27.8 (L) 34.0 - 46.6 %    MCV 93.9 79.0 - 97.0 fL    MCH 30.7 26.6 - 33.0 pg    MCHC 32.7 31.5 - 35.7 g/dL    RDW 16.9 (H) 12.3 - 15.4 %    RDW-SD 56.8 (H) 37.0 - 54.0 fl    MPV 10.1 6.0 - 12.0 fL    Platelets 100 (L) 140 - 450 10*3/mm3    Neutrophil % 70.4 42.7 - 76.0 %    Lymphocyte % 14.5 (L) 19.6 - 45.3 %    Monocyte % 7.9 5.0 - 12.0 %    Eosinophil % 5.4 0.3 - 6.2 %    Basophil % 1.0 0.0 - 1.5 %    Immature Grans % 0.8 (H) 0.0 - 0.5 %    Neutrophils, Absolute 2.76 1.70 - 7.00 10*3/mm3    Lymphocytes, Absolute 0.57 (L) 0.70 - 3.10 10*3/mm3    Monocytes, Absolute 0.31 0.10 - 0.90 10*3/mm3    Eosinophils, Absolute 0.21 0.00 - 0.40 10*3/mm3    Basophils, Absolute 0.04 0.00 - 0.20 10*3/mm3    Immature Grans, Absolute 0.03 0.00 - 0.05 10*3/mm3    nRBC 0.0 0.0 - 0.2 /100 WBC   Hepatitis B Surface Antigen    Collection Time: 01/05/23  7:29 AM    Specimen: Blood   Result Value Ref Range    Hepatitis B Surface Ag Non-Reactive Non-Reactive   POC Glucose Once    Collection Time: 01/05/23  7:50 AM    Specimen: Blood   Result Value Ref Range    Glucose 91 70 - 130 mg/dL       Radiology:  Pertinent radiology studies were reviewed as described above      Medications have been reviewed separately in chart  overview      ASSESSMENT:  End-stage renal disease on home hemodialysis 5 days/week.    Dialysis today and Tuesday Thursday Saturday schedule this admission    Complicated UTI (urinary tract infection), on IV antibiotics    Diabetes type 2, controlled (HCC)    Hyperlipidemia    Hypertension    Normocytic anemia, chronic secondary to chemotherapy and renal disease.  On high-dose BEAU, Mircera with outpatient dialysis, transfusion planned.  On Epogen, improved with transfusion    Metastatic breast cancer, palliative chemotherapy per oncology    Morbid obesity with BMI of 50.0-59.9, adult (HCC)    ESRD (end stage renal disease) (HCC)  Hypocalcemia, corrects to normal due to low albumin        DISCUSSION/PLAN:   Dialysis today and continue Tuesday Thursday Saturday schedule  Potassium improved after oral replacement but still low.  Will dialyze on a 4K bath today which should correct her potassium further  IV antibiotics for UTI, follow-up on culture results, growing gram-negative bacilli  Hemoglobin improved posttransfusion  Did have some vascular congestion on chest x-ray but UF challenge may be difficult given her chronic hypotension.  Goal UF 2 L with HD today but her starting low BP may limit ultrafiltration  Will try adding midodrine for BP support  Continue high-dose Epogen with HD     Continue to monitor electrolytes and volume closely.      Tai Antonio MD   [facility]   Office phone number: [phone]  Answering service phone number: [phone]    01/05/23  09:05 EST    Dictation performed using Dragon dictation software

## 2023-01-05 NOTE — THERAPY EVALUATION
Patient Name: Juana Hall  : 1960    MRN: [id number]                              Today's Date: 2023       Admit Date: 1/3/2023    Visit Dx:     ICD-10-CM ICD-9-CM   1. Complicated UTI (urinary tract infection)  N39.0 599.0   2. Generalized weakness  R53.1 780.79   3. End-stage renal disease on peritoneal dialysis (HCC)  N18.6 585.6    Z99.2 V45.11   4. Heart murmur  R01.1 785.2   5. Chronic anemia  D64.9 285.9   6. Acute respiratory failure with hypoxia (HCC)  J96.01 518.81     Patient Active Problem List   Diagnosis   • Anxiety   • Depression   • Diabetes type 2, controlled (HCC)   • Hyperlipidemia   • Heart murmur   • Hypertension   • Post-traumatic osteoarthritis of multiple joints   • Psoriasis   • Radiculopathy   • Acquired hypothyroidism   • Peripheral neuropathy   • Normocytic anemia   • History of right breast cancer   • Omental mass   • Metastatic breast cancer (HCC)   • Elevated serum creatinine   • Iron deficiency anemia   • Anemia in stage 3 chronic kidney disease (HCC)   • Stage 3b chronic kidney disease (HCC)   • Anxiety and depression   • RYAN (acute kidney injury) (HCC)   • Anemia, chronic disease   • Diastolic CHF, chronic (HCC)   • Frequent UTI   • Metabolic acidosis   • Severe malnutrition (HCC)   • Hydronephrosis   • Generalized weakness   • COVID-19 virus detected   • Hypocalcemia   • Morbid obesity with BMI of 50.0-59.9, adult (HCC)   • ESRD (end stage renal disease) (HCC)   • Pancytopenia due to chemotherapy (HCC)   • Chronic anemia   • Rectal bleeding   • Bicuspid aortic valve   • Complicated UTI (urinary tract infection)   • Acute UTI (urinary tract infection)   • Hypokalemia   • Severe aortic stenosis   • Combined systolic and diastolic congestive heart failure (HCC)   • Depression   • ANDREW (obstructive sleep apnea)   • Diastolic CHF, chronic (HCC)   • Adverse effect of chemotherapy     Past Medical History:   Diagnosis Date   • Anemia    • Anxiety and depression    •  Bicuspid aortic valve    • Breast cancer (HCC)     RIGHT, HAD NEELA. MASTECTOMY, CHEMO AND RADIATION   • CHF (congestive heart failure) (HCC)    • CKD (chronic kidney disease)    • Diabetes mellitus (HCC)    • [facility] ([facility]CC[facility]   • Elevated cholesterol    • Frequent UTI    • Heart murmur    • History of kidney stones    • History of MRSA infection     POST BREAST SURGERY-TREATED [facility]   • History of sepsis 2010    KIDNEY STONE   • History of transfusion    • Hyperlipidemia    • Hypertension    • Hyperthyroidism    • Hypothyroidism    • Kidney stone     CURRENT LEFT-DEC 2021   • Lymphedema of leg     LEFT   • Metastasis from breast cancer (HCC)     STOMACH-OMENTUM   • Neuromuscular disorder (HCC)    • Obesity    • ANDREW on CPAP     CPAP   • Osteoarthrosis    • Peripheral neuropathy     FEET BILAT   • Radiculopathy    • Rectal bleeding 2022   • Thickened endometrium    • Weakness     BILAT LEGS-WITH ANY AMT OF TIME     Past Surgical History:   Procedure Laterality Date   • ARTERIOVENOUS FISTULA/SHUNT SURGERY Left 2021    Procedure: LEFT  BRACHIOCEPALIC ARTERIOVENOUS FISTULA;  Surgeon: Dejuan Adler MD;  Location: [facility] OR;  Service: Vascular;  Laterality: Left;   • BREAST RECONSTRUCTION      WITH IMPLANTS, AND REVISION, NOW REMOVED   • BREAST SURGERY     • CARDIAC CATHETERIZATION N/A 2022    Procedure: CORONARY ANGIOGRAPHY;  Surgeon: Luis Rivers MD;  Location: [facility] INVASIVE LOCATION;  Service: Cardiology;  Laterality: N/A;   • CARDIAC CATHETERIZATION N/A 2022    Procedure: Right Heart Cath;  Surgeon: Luis Rivers MD;  Location: [facility] CATH INVASIVE LOCATION;  Service: Cardiology;  Laterality: N/A;   • CATARACT EXTRACTION WITH INTRAOCULAR LENS IMPLANT Bilateral    •  SECTION  1987   •  SECTION  1987   • CYSTOSCOPY W/ URETERAL STENT PLACEMENT     • CYSTOSCOPY W/  URETERAL STENT PLACEMENT Left 12/16/2021    Procedure: CYSTOSCOPY KEFT RETROGRADE PYELOGRAM  LEFT  URETERAL STENT PLACEMENT;  Surgeon: Leodan Mckee Jr., MD;  Location: [facility] MAIN OR;  Service: Urology;  Laterality: Left;   • CYSTOSCOPY W/ URETERAL STENT PLACEMENT Left 03/10/2022    Procedure: CYSTOSCOPY AND LEFT URETERAL STENT EXCHANGE, RETROGRADE PYELOGRAM;  Surgeon: Leodan Mckee Jr., MD;  Location: [facility] MAIN OR;  Service: Urology;  Laterality: Left;   • D & C HYSTEROSCOPY N/A 05/22/2017    Procedure: DILATATION AND CURETTAGE, HYSTEROSCOPY ;  Surgeon: Cherie Oliveros MD;  Location:  [facility];  Service:    • D & C HYSTEROSCOPY N/A 09/19/2019    Procedure: DILATATION AND CURETTAGE HYSTEROSCOPY/POLYPECTOMY;  Surgeon: Cherie Oliveros MD;  [facility];  Service: Gynecology   • INSERTION AND REMOVAL HEMODIALYSIS CATHETER Right 05/01/2021   • INSERTION HEMODIALYSIS CATHETER Right 05/01/2021    Procedure: HEMODIALYSIS CATHETER INSERTION;  Surgeon: Clint Hernández MD;  Location: [facility] MAIN OR;  Service: Vascular;  Laterality: Right;   • LYMPH NODE BIOPSY     • MASTECTOMY Bilateral 2004      General Information     Row Name 01/05/23 1604          Physical Therapy Time and Intention    Document Type evaluation  -     Mode of Treatment individual therapy;physical therapy  -     Row Name 01/05/23 1604          General Information    Prior Level of Function independent:;gait;transfer;bed mobility  walks with a walker,  assists with mobility, transfers, and gait as needed  -     Existing Precautions/Restrictions fall  -     Barriers to Rehab medically complex;previous functional deficit  -     Row Name 01/05/23 1604          Living Environment    People in Home spouse  -     Row Name 01/05/23 1604          Home Main Entrance    Number of Stairs, Main Entrance none  -     Row Name 01/05/23 1604          Stairs Within Home, Primary    Number of Stairs, Within Home, Primary  none  -     Row Name 01/05/23 1604          Cognition    Orientation Status (Cognition) oriented x 3  -     Row Name 01/05/23 1604          Safety Issues, Functional Mobility    Impairments Affecting Function (Mobility) balance;endurance/activity tolerance;strength  -           User Key  (r) = Recorded By, (t) = Taken By, (c) = Cosigned By    Initials Name Provider Type     Rayna Dooley PT Physical Therapist               Mobility     Row Name 01/05/23 1609          Bed Mobility    Bed Mobility supine-sit;sit-supine  -     Supine-Sit [place] (Bed Mobility) verbal cues;nonverbal cues (demo/gesture);minimum assist (75% patient effort);moderate assist (50% patient effort);2 person assist   assisted  -     Sit-Supine [place] (Bed Mobility) not tested  sitting in chair  -     Assistive Device (Bed Mobility) bed rails;head of bed elevated  -     Row Name 01/05/23 1609          Sit-Stand Transfer    Sit-Stand [place] (Transfers) verbal cues;nonverbal cues (demo/gesture);minimum assist (75% patient effort);2 person assist;moderate assist (50% patient effort)  -     Assistive Device (Sit-Stand Transfers) walker, front-wheeled  -     Comment, (Sit-Stand Transfer) pt attempted to stand x3, able to achieve partial stand x2, and full stand x1  -     Row Name 01/05/23 1609          Gait/Stairs (Locomotion)    [place] Level (Gait) verbal cues;nonverbal cues (demo/gesture);minimum assist (75% patient effort);2 person assist  -     Assistive Device (Gait) walker, front-wheeled  -     Distance in Feet (Gait) 3 ft bed to chair  -     Deviations/Abnormal Patterns (Gait) kelle decreased;gait speed decreased;stride length decreased  -     Bilateral Gait Deviations forward flexed posture  -           User Key  (r) = Recorded By, (t) = Taken By, (c) = Cosigned By    Initials Name Provider Type    Rayna Yancey PT Physical Therapist               Obj/Interventions      Row Name 01/05/23 1612          Range of Motion Comprehensive    Comment, General Range of Motion L LE in external rotation, no B LE ROM deficits noted when sitting EOB  -     Row Name 01/05/23 1612          Strength Comprehensive (MMT)    Comment, General Manual Muscle Testing (MMT) Assessment generalized weakness noted with functional mobility  -     Row Name 01/05/23 1612          Balance    Balance Assessment standing static balance;standing dynamic balance  -     Static Standing Balance verbal cues;non-verbal cues (demo/gesture);minimal assist  -CH     Dynamic Standing Balance verbal cues;non-verbal cues (demo/gesture);minimal assist  -CH     Position/Device Used, Standing Balance walker, rolling  -CH           User Key  (r) = Recorded By, (t) = Taken By, (c) = Cosigned By    Initials Name Provider Type     Rayna Dooley, PT Physical Therapist               Goals/Plan     Row Name 01/05/23 1619          Bed Mobility Goal 1 (PT)    Activity/Assistive Device (Bed Mobility Goal 1, PT) bed mobility activities, all  -CH     [place] Level/Cues Needed (Bed Mobility Goal 1, PT) contact guard required  -CH     Time Frame (Bed Mobility Goal 1, PT) 1 week  -[facility] Name 01/05/23 1619          Transfer Goal 1 (PT)    Activity/Assistive Device (Transfer Goal 1, PT) transfers, all;walker, rolling  -CH     [place] Level/Cues Needed (Transfer Goal 1, PT) contact guard required  -CH     Time Frame (Transfer Goal 1, PT) 1 week  -[facility] Name 01/05/23 1619          Gait Training Goal 1 (PT)    Activity/Assistive Device (Gait Training Goal 1, PT) gait (walking locomotion);walker, rolling  -CH     [place] Level (Gait Training Goal 1, PT) contact guard required  -CH     Distance (Gait Training Goal 1, PT) 100  -CH     Time Frame (Gait Training Goal 1, PT) 1 week  -[facility] Name 01/05/23 1619          Therapy Assessment/Plan (PT)    Planned Therapy Interventions (PT) balance training;bed  mobility training;gait training;home exercise program;patient/family education;strengthening;transfer training  -           User Key  (r) = Recorded By, (t) = Taken By, (c) = Cosigned By    Initials Name Provider Type    Rayna Yancey, PT Physical Therapist               Clinical Impression     Row Name 01/05/23 1614          Pain    Pretreatment Pain Rating 0/10 - no pain  -     Posttreatment Pain Rating 0/10 - no pain  -     Row Name 01/05/23 1614          Plan of Care Review    Plan of Care Reviewed With patient  -     Outcome Evaluation Pt is a 61 yo F who was admitted with generalized weakness. Pt found to have an acute UTI. Pt presents to PT with impaired functional mobility and gait secondary to generalized weakness, impaired balance, and decreased activity tolerance. Pt may benefit from skilled PT to address strength, mobility, and gait.  -     Row Name 01/05/23 1614          Therapy Assessment/Plan (PT)    Patient/Family Therapy Goals Statement (PT) to return to [facility]  -     Rehab Potential (PT) good, to achieve stated therapy goals  -     Criteria for Skilled Interventions Met (PT) skilled treatment is necessary  -     Therapy Frequency (PT) 6 times/wk  -     Row Name 01/05/23 1614          Positioning and Restraints    Pre-Treatment Position in bed  -     Post Treatment Position chair  -CH     In Chair sitting;notified nsg;call light within reach;encouraged to call for assist;with family/caregiver  -           User Key  (r) = Recorded By, (t) = Taken By, (c) = Cosigned By    Initials Name Provider Type    Rayna Yancey, PT Physical Therapist               Outcome Measures     Row Name 01/05/23 1619          How much help from another person do you currently need...    Turning from your back to your side while in flat bed without using bedrails? 2  -CH     Moving from lying on back to sitting on the side of a flat bed without bedrails? 2  -CH     Moving to and from a  bed to a chair (including a wheelchair)? 2  -CH     Standing up from a chair using your arms (e.g., wheelchair, bedside chair)? 2  -CH     Climbing 3-5 steps with a railing? 1  -CH     To walk in hospital room? 2  -CH     AM-PAC 6 Clicks Score (PT) 11  -     Highest level of mobility 4 --> Transferred to chair/commode  -     Row Name 01/05/23 1619          Functional Assessment    Outcome Measure Options AM-PAC 6 Clicks Basic Mobility (PT)  -           User Key  (r) = Recorded By, (t) = Taken By, (c) = Cosigned By    Initials Name Provider Type     aRyna Dooley, PT Physical Therapist                             Physical Therapy Education     Title: PT OT SLP Therapies (Done)     Topic: Physical Therapy (Done)     Point: Mobility training (Done)     Learning Progress Summary           Patient Acceptance, E,TB,D, VU,NR by  at 1/5/2023 1621                   Point: Home exercise program (Done)     Learning Progress Summary           Patient Acceptance, E,TB,D, VU,NR by  at 1/5/2023 1621                   Point: Body mechanics (Done)     Learning Progress Summary           Patient Acceptance, E,TB,D, VU,NR by  at 1/5/2023 1621                   Point: Precautions (Done)     Learning Progress Summary           Patient Acceptance, E,TB,D, VU,NR by  at 1/5/2023 1621                               User Key     Initials Effective Dates Name Provider Type [facility] 06/16/21 -  Rayna Dooley, PT Physical Therapist PT              PT Recommendation and Plan  Planned Therapy Interventions (PT): balance training, bed mobility training, gait training, home exercise program, patient/family education, strengthening, transfer training  Plan of Care Reviewed With: patient  Outcome Evaluation: Pt is a 63 yo F who was admitted with generalized weakness. Pt found to have an acute UTI. Pt presents to PT with impaired functional mobility and gait secondary to generalized weakness, impaired balance, and  decreased activity tolerance. Pt may benefit from skilled PT to address strength, mobility, and gait.     Time Calculation:    PT Charges     Row Name 01/05/23 1621             Time Calculation    Start Time 1538  -      Stop Time 1552  -      Time Calculation (min) 14 min  -CH      PT Received On 01/05/23  -      PT - Next Appointment 01/06/23  -      PT Goal Re-Cert Due Date 01/12/23  -         Time Calculation- PT    Total Timed Code Minutes- PT 8 minute(s)  -CH         Timed Charges    11182 - PT Therapeutic Activity Minutes 8  -CH         Total Minutes    Timed Charges Total Minutes 8  -CH       Total Minutes 8  -CH            User Key  (r) = Recorded By, (t) = Taken By, (c) = Cosigned By    Initials Name Provider Type    Rayna Yancey, PT Physical Therapist              Therapy Charges for Today     Code Description Service Date Service Provider Modifiers Qty    25156377830  PT THERAPEUTIC ACT EA 15 MIN 1/5/2023 Rayna Dooley, PT GP 1    [id number]  PT EVAL MOD COMPLEXITY 2 1/5/2023 Rayna Dooley, PT GP 1          PT G-Codes  Outcome Measure Options: AM-PAC 6 Clicks Basic Mobility (PT)  AM-PAC 6 Clicks Score (PT): 11  PT Discharge Summary  Anticipated Discharge Disposition (PT): home with assist, home with home health, skilled nursing facility (pending progress and home resources)    Rayna Dooley, PT  1/5/2023

## 2023-01-05 NOTE — PLAN OF CARE
Goal Outcome Evaluation:  Plan of Care Reviewed With: patient           Outcome Evaluation: Pt is a 63 yo F who was admitted with generalized weakness. Pt found to have an acute UTI. Pt presents to PT with impaired functional mobility and gait secondary to generalized weakness, impaired balance, and decreased activity tolerance. Pt may benefit from skilled PT to address strength, mobility, and gait.

## 2023-01-06 ENCOUNTER — READMISSION MANAGEMENT (OUTPATIENT)
Dept: CALL CENTER | Facility: HOSPITAL | Age: 63
End: 2023-01-06
Payer: COMMERCIAL

## 2023-01-06 VITALS
WEIGHT: 293 LBS | HEIGHT: 67 IN | OXYGEN SATURATION: 100 % | DIASTOLIC BLOOD PRESSURE: 52 MMHG | SYSTOLIC BLOOD PRESSURE: 116 MMHG | BODY MASS INDEX: 45.99 KG/M2 | HEART RATE: 56 BPM | TEMPERATURE: 98.2 F | RESPIRATION RATE: 16 BRPM

## 2023-01-06 PROBLEM — E87.6 HYPOKALEMIA: Status: RESOLVED | Noted: 2023-01-04 | Resolved: 2023-01-06

## 2023-01-06 LAB
ALBUMIN SERPL-MCNC: 2.8 G/DL (ref 3.5–5.2)
ANION GAP SERPL CALCULATED.3IONS-SCNC: 6.8 MMOL/L (ref 5–15)
ANISOCYTOSIS BLD QL: ABNORMAL
BACTERIA SPEC AEROBE CULT: ABNORMAL
BACTERIA SPEC AEROBE CULT: ABNORMAL
BASOPHILS # BLD MANUAL: 0.04 10*3/MM3 (ref 0–0.2)
BASOPHILS NFR BLD MANUAL: 1 % (ref 0–1.5)
BUN SERPL-MCNC: 26 MG/DL (ref 8–23)
BUN/CREAT SERPL: 5.2 (ref 7–25)
BURR CELLS BLD QL SMEAR: ABNORMAL
CALCIUM SPEC-SCNC: 7.7 MG/DL (ref 8.6–10.5)
CHLORIDE SERPL-SCNC: 96 MMOL/L (ref 98–107)
CO2 SERPL-SCNC: 28.2 MMOL/L (ref 22–29)
CREAT SERPL-MCNC: 5.01 MG/DL (ref 0.57–1)
DEPRECATED RDW RBC AUTO: 55.4 FL (ref 37–54)
EGFRCR SERPLBLD CKD-EPI 2021: 9.1 ML/MIN/1.73
EOSINOPHIL # BLD MANUAL: 0.13 10*3/MM3 (ref 0–0.4)
EOSINOPHIL NFR BLD MANUAL: 3.1 % (ref 0.3–6.2)
ERYTHROCYTE [DISTWIDTH] IN BLOOD BY AUTOMATED COUNT: 16.6 % (ref 12.3–15.4)
GLUCOSE BLDC GLUCOMTR-MCNC: 156 MG/DL (ref 70–130)
GLUCOSE BLDC GLUCOMTR-MCNC: 202 MG/DL (ref 70–130)
GLUCOSE SERPL-MCNC: 164 MG/DL (ref 65–99)
HCT VFR BLD AUTO: 27.7 % (ref 34–46.6)
HGB BLD-MCNC: 9.2 G/DL (ref 12–15.9)
LYMPHOCYTES # BLD MANUAL: 0.39 10*3/MM3 (ref 0.7–3.1)
LYMPHOCYTES NFR BLD MANUAL: 3.1 % (ref 5–12)
MAGNESIUM SERPL-MCNC: 2 MG/DL (ref 1.6–2.4)
MCH RBC QN AUTO: 31.2 PG (ref 26.6–33)
MCHC RBC AUTO-ENTMCNC: 33.2 G/DL (ref 31.5–35.7)
MCV RBC AUTO: 93.9 FL (ref 79–97)
MONOCYTES # BLD: 0.13 10*3/MM3 (ref 0.1–0.9)
NEUTROPHILS # BLD AUTO: 3.47 10*3/MM3 (ref 1.7–7)
NEUTROPHILS NFR BLD MANUAL: 83.5 % (ref 42.7–76)
PHOSPHATE SERPL-MCNC: 3 MG/DL (ref 2.5–4.5)
PLAT MORPH BLD: NORMAL
PLATELET # BLD AUTO: 109 10*3/MM3 (ref 140–450)
PMV BLD AUTO: 10.2 FL (ref 6–12)
POIKILOCYTOSIS BLD QL SMEAR: ABNORMAL
POTASSIUM SERPL-SCNC: 3.6 MMOL/L (ref 3.5–5.2)
RBC # BLD AUTO: 2.95 10*6/MM3 (ref 3.77–5.28)
SODIUM SERPL-SCNC: 131 MMOL/L (ref 136–145)
VARIANT LYMPHS NFR BLD MANUAL: 9.3 % (ref 19.6–45.3)
WBC MORPH BLD: NORMAL
WBC NRBC COR # BLD: 4.15 10*3/MM3 (ref 3.4–10.8)

## 2023-01-06 PROCEDURE — 99232 SBSQ HOSP IP/OBS MODERATE 35: CPT | Performed by: NURSE PRACTITIONER

## 2023-01-06 PROCEDURE — 97530 THERAPEUTIC ACTIVITIES: CPT

## 2023-01-06 PROCEDURE — 25010000002 HEPARIN (PORCINE) PER 1000 UNITS: Performed by: STUDENT IN AN ORGANIZED HEALTH CARE EDUCATION/TRAINING PROGRAM

## 2023-01-06 PROCEDURE — 80069 RENAL FUNCTION PANEL: CPT | Performed by: INTERNAL MEDICINE

## 2023-01-06 PROCEDURE — 85025 COMPLETE CBC W/AUTO DIFF WBC: CPT | Performed by: STUDENT IN AN ORGANIZED HEALTH CARE EDUCATION/TRAINING PROGRAM

## 2023-01-06 PROCEDURE — 99231 SBSQ HOSP IP/OBS SF/LOW 25: CPT | Performed by: INTERNAL MEDICINE

## 2023-01-06 PROCEDURE — 82962 GLUCOSE BLOOD TEST: CPT

## 2023-01-06 PROCEDURE — 83735 ASSAY OF MAGNESIUM: CPT | Performed by: STUDENT IN AN ORGANIZED HEALTH CARE EDUCATION/TRAINING PROGRAM

## 2023-01-06 PROCEDURE — 85007 BL SMEAR W/DIFF WBC COUNT: CPT | Performed by: STUDENT IN AN ORGANIZED HEALTH CARE EDUCATION/TRAINING PROGRAM

## 2023-01-06 RX ORDER — CEPHALEXIN 500 MG/1
500 CAPSULE ORAL 2 TIMES DAILY
Qty: 4 CAPSULE | Refills: 0 | Status: SHIPPED | OUTPATIENT
Start: 2023-01-06 | End: 2023-01-20

## 2023-01-06 RX ORDER — MIDODRINE HYDROCHLORIDE 5 MG/1
5 TABLET ORAL
Qty: 90 TABLET | Refills: 0 | Status: SHIPPED | OUTPATIENT
Start: 2023-01-06 | End: 2023-03-16

## 2023-01-06 RX ADMIN — INSULIN GLARGINE-YFGN 25 UNITS: 100 INJECTION, SOLUTION SUBCUTANEOUS at 10:13

## 2023-01-06 RX ADMIN — Medication 1000 MCG: at 10:13

## 2023-01-06 RX ADMIN — LEVOTHYROXINE SODIUM 50 MCG: 0.05 TABLET ORAL at 10:13

## 2023-01-06 RX ADMIN — HEPARIN SODIUM 5000 UNITS: 5000 INJECTION INTRAVENOUS; SUBCUTANEOUS at 06:07

## 2023-01-06 RX ADMIN — Medication 10 ML: at 10:16

## 2023-01-06 RX ADMIN — ARMODAFINIL 250 MG: 250 TABLET ORAL at 10:14

## 2023-01-06 RX ADMIN — SERTRALINE HYDROCHLORIDE 150 MG: 50 TABLET, FILM COATED ORAL at 10:13

## 2023-01-06 RX ADMIN — FOLIC ACID 1000 MCG: 1 TABLET ORAL at 10:13

## 2023-01-06 NOTE — DISCHARGE SUMMARY
Patient Name: Juana Hall  : 1960  MRN: [id number]    Date of Admission: 1/3/2023  Date of Discharge:  2023  Primary Care Physician: Kiana Gramajo APRN      Chief Complaint:   Weakness - Generalized, Blood in Urine, and Vomiting      Discharge Diagnoses     Active Hospital Problems    Diagnosis  POA   • **Acute UTI (urinary tract infection) [N39.0]  Unknown   • Diastolic CHF, chronic ([facility]) [I50.32]  Unknown   • Adverse effect of chemotherapy [T45.1X5A]  Unknown   • Severe aortic stenosis [I35.0]  Unknown   • Depression [F32.A]  Unknown   • ANDREW (obstructive sleep apnea) [G47.33]  Unknown   • ESRD (end stage renal disease) ([facility]) [N18.6]  Yes   • Morbid obesity with BMI of 50.0-59.9, adult ([facility]) [E66.01, Z68.43]  Not Applicable   • Metastatic breast cancer ([facility]) [C50.919]  Yes   • Normocytic anemia [D64.9]  Yes   • Diabetes type 2, controlled ([facility]) [E11.9]  Yes   • Hyperlipidemia [E78.5]  Yes   • Hypertension [I10]  Yes      Resolved Hospital Problems    Diagnosis Date Resolved POA   • Hypokalemia [E87.6] 2023 Yes        Hospital Course     Ms. Hall is a 62 y.o. female with a history of ESRD on home HD, metastatic breast cancer, DM2 and severe aortic stenosis who presented to [facility] initially complaining of weakness. She had been unable to stand up afetr completing her HD treatment.  Please see the admitting history and physical for further details.  She was found to have bloody urine and had been having dysuria. She does still urinate a fair amount even with her ESRD status. Her UA was grossly abnormal and she wa sstarted on IV abx. Urine and blood cultures were obtained. The urine grew both Klebsiella and E. Coli but blood cx were negative. She was seen and managed by nephrology regarding her HD. She had been undergoing workup for possible TAVR and cardiology was consulted here. They felt she needed valvulopasty as she was not felt to be acanddiate for TAVR  angela due to her comorbid medical issues. They have recommended she return next week for this and cleared her to go today. She felt stable for discharge and was ready to go this morning.    Day of Discharge     Subjective:  No new complaint, wants to go home    Physical Exam:  Temp:  [97.9 °F (36.6 °C)-100.5 °F (38.1 °C)] 98.2 °F (36.8 °C)  Heart Rate:  [55-72] 56  Resp:  [16] 16  BP: (102-119)/(47-86) 116/52  Body mass index is 58.01 kg/m².  Physical Exam  Vitals reviewed.   Constitutional:       Appearance: She is obese. She is ill-appearing.   Cardiovascular:      Rate and Rhythm: Normal rate and regular rhythm.   Pulmonary:      Effort: Pulmonary effort is normal. No respiratory distress.      Breath sounds: No wheezing.   Musculoskeletal:      Right lower leg: Edema present.      Left lower leg: Edema present.   Skin:     General: Skin is dry.      Comments: flaky   Neurological:      Mental Status: She is alert and oriented to person, place, and time.         Consultants     Consult Orders (all) (From admission, onward)     Start     Ordered    01/04/23 1121  Inpatient Cardiology Consult  Once        Specialty:  Cardiology  Provider:  Luis Rivers MD    01/04/23 1120    01/04/23 0934  Hematology & Oncology Inpatient Consult  Once        Specialty:  Hematology and Oncology  Provider:  Leodan Irvin Jr., MD    01/04/23 0933    01/04/23 0300  Inpatient Nephrology Consult  Once        Specialty:  Nephrology  Provider:  Tai Antonio MD    01/04/23 0300    01/04/23 0201  [facility] (on-call MD unless specified) Details  Once        Specialty:  Hospitalist  Provider:  (Not yet assigned)    01/04/23 0200              Procedures       Imaging Results (All)     Procedure Component Value Units Date/Time    XR Chest 1 View [[id number]] Collected: 01/04/23 0235     Updated: 01/04/23 0235    Narrative:        Patient: AZAEL ALEXANDER  Time Out: 02:34  Exam(s): FILM CXR 1 VIEW     EXAM:    XR Chest, 1  View    CLINICAL HISTORY:     Reason for exam: Fever, shortness of breath.    TECHNIQUE:    Frontal view of the chest.    COMPARISON:  January 20 at 2022.    FINDINGS:    Lungs: Patchy opacities are present at the left lung base, probably   related to atelectasis, but an early infiltrate is not excluded.    Pleural space:  Unremarkable.  No pneumothorax.    Heart: Cardiomegaly is present, with mild pulmonary vascular congestion.    The possibility of early congestive heart failure is not excluded.    Mediastinum:  Unremarkable.    Bones joints: Degenerative disc disease present.    IMPRESSION:       Cardiomegaly is present with mild pulmonary vascular congestion.    Opacities present at the left lung base, probably related to atelectasis,   but an early infiltrate is not excluded.    Impression:          Electronically signed by Chito Loco D.O. on 01-04-23 at 0234            Pertinent Labs     Results from last 7 days   Lab Units 01/06/23  0553 01/05/23  0602 01/04/23  0650 01/03/23  2317   WBC 10*3/mm3 4.15 3.92 3.84 4.61   HEMOGLOBIN g/dL 9.2* 9.1* 7.0* 7.8*   PLATELETS 10*3/mm3 109* 100* 90* 98*     Results from last 7 days   Lab Units 01/06/23  0553 01/05/23  0602 01/04/23  0650 01/03/23  2317   SODIUM mmol/L 131* 139  131* 135* 134*   POTASSIUM mmol/L 3.6 3.2*  3.1* 2.8* 3.0*   CHLORIDE mmol/L 96* 99  95* 94* 97*   CO2 mmol/L 28.2 25.2  25.1 27.8 29.0   BUN mg/dL 26* 35*  36* 27* 25*   CREATININE mg/dL 5.01* 5.65*  6.33* 5.29* 4.98*   GLUCOSE mg/dL 164* 88  90 194* 201*   EGFR mL/min/1.73 9.1* 7.9*  6.9* 8.5* 9.2*     Results from last 7 days   Lab Units 01/06/23  0553 01/05/23  0602 01/03/23  2317   ALBUMIN g/dL 2.8* 2.3* 2.7*   BILIRUBIN mg/dL  --   --  0.6   ALK PHOS U/L  --   --  80   AST (SGOT) U/L  --   --  12   ALT (SGPT) U/L  --   --  6     Results from last 7 days   Lab Units 01/06/23  0553 01/05/23  0602 01/04/23  0650 01/03/23  2317   CALCIUM mg/dL 7.7* 8.0*  8.2* 7.8* 8.3*   ALBUMIN g/dL  2.8* 2.3*  --  2.7*   MAGNESIUM mg/dL 2.0 1.9  --  2.0   PHOSPHORUS mg/dL 3.0 4.3  --   --                Invalid input(s): LDLCALC  Results from last 7 days   Lab Units 01/04/23  0215 01/04/23  0059 01/04/23  0051   BLOODCX  No growth at 2 days No growth at 2 days  --    URINECX   --   --  >100,000 CFU/mL Escherichia coli*  >100,000 CFU/mL Klebsiella oxytoca*     Results from last 7 days   Lab Units 01/04/23  0050   COVID19  Not Detected       Test Results Pending at Discharge     Pending Labs     Order Current Status    Blood Culture - Blood, Hand, Right Preliminary result    Blood Culture - Blood, Wrist, Right Preliminary result          Discharge Details        Discharge Medications      New Medications      Instructions Start Date   cephalexin 500 MG capsule  Commonly known as: Keflex   500 mg, Oral, 2 Times Daily      midodrine 5 MG tablet  Commonly known as: PROAMATINE   5 mg, Oral, 3 Times Daily Before Meals         Continue These Medications      Instructions Start Date   abemaciclib 150 mg tablet chemo tablet  Commonly known as: VERZENIO   150 mg, Oral, 2 Times Daily      acetaminophen 500 MG tablet  Commonly known as: TYLENOL   500 mg, Oral, As Needed      Armodafinil 250 MG tablet   250 mg, Oral, Daily      aspirin 81 MG EC tablet   81 mg, Oral, Daily      atorvastatin 40 MG tablet  Commonly known as: LIPITOR   TAKE ONE TABLET BY MOUTH ONCE NIGHTLY      folic acid 1 MG tablet  Commonly known as: FOLVITE   TAKE ONE TABLET BY MOUTH DAILY      gabapentin 300 MG capsule  Commonly known as: Neurontin   300 mg, Oral, Nightly      Levemir FlexTouch 100 UNIT/ML injection  Generic drug: insulin detemir   50 Units, Subcutaneous, Daily      levothyroxine 50 MCG tablet  Commonly known as: SYNTHROID, LEVOTHROID   50 mcg, Oral, Daily      mupirocin 2 % ointment  Commonly known as: BACTROBAN   No dose, route, or frequency recorded.      NON FORMULARY   5 mg, Oral, 2 Times Daily, Versinio      NON FORMULARY   1 dose,  Subcutaneous, Every 30 Days, facidex      ondansetron 8 MG tablet  Commonly known as: Zofran   8 mg, Oral, Every 8 Hours PRN      sertraline 100 MG tablet  Commonly known as: ZOLOFT   150 mg, Oral, Daily      vitamin B-12 1000 MCG tablet  Commonly known as: CYANOCOBALAMIN   1,000 mcg, Oral, Daily         Stop These Medications    carvedilol 3.125 MG tablet  Commonly known as: COREG            No Known Allergies    Discharge Disposition:  Home or Self Care      Discharge Diet:  Diet Order   Procedures   • Diet: Diabetic Diets; Consistent Carbohydrate; Texture: Regular Texture (IDDSI 7); Fluid Consistency: Thin (IDDSI 0)       Discharge Activity:       CODE STATUS:    Code Status and Medical Interventions:   Ordered at: 01/04/23 0300     Code Status (Patient has no pulse and is not breathing):    CPR (Attempt to Resuscitate)     Medical Interventions (Patient has pulse or is breathing):    Full Support       Future Appointments   Date Time Provider Department Center   1/19/2023  9:00 AM LAB CHAIR 1 [facility]  [facility] [place]   1/19/2023  9:20 AM Leodan Irvin Jr., MD [facility] [place]   1/19/2023 10:15 AM INJECTION CHAIR [facility] INF[facility]   2/13/2023 12:40 PM Luis Rivers MD [facility]      Follow-up Information     Kiana Gramajo APRN .    Specialty: Family Medicine  Contact information:  [address] [address] [id number] [phone]                         Time Spent on Discharge:  Greater than 30 minutes      Ron Jacob MD  [facility]  01/06/23  10:19 EST

## 2023-01-06 NOTE — PLAN OF CARE
Goal Outcome Evaluation:  Plan of Care Reviewed With: patient           Outcome Evaluation: Patient awake and alert, agreeable to therapy, wants to get up out of bed. Patient performed bed mobility with Avi, sit to stand with Avi, took a couple steps around from bed to chair with Avi and rwx. Patient demonstrates improvement with mobility today, anticipate d/c home. Pt expresses no concerns about mobility at home, states her  will help her.

## 2023-01-06 NOTE — THERAPY TREATMENT NOTE
Patient Name: Juana Hall  : 1960    MRN: [id number]                              Today's Date: 2023       Admit Date: 1/3/2023    Visit Dx:     ICD-10-CM ICD-9-CM   1. Complicated UTI (urinary tract infection)  N39.0 599.0   2. Generalized weakness  R53.1 780.79   3. End-stage renal disease on peritoneal dialysis (HCC)  N18.6 585.6    Z99.2 V45.11   4. Heart murmur  R01.1 785.2   5. Chronic anemia  D64.9 285.9   6. Acute respiratory failure with hypoxia (HCC)  J96.01 518.81   7. Severe aortic stenosis  I35.0 424.1     Patient Active Problem List   Diagnosis   • Anxiety   • Depression   • Diabetes type 2, controlled (HCC)   • Hyperlipidemia   • Heart murmur   • Hypertension   • Post-traumatic osteoarthritis of multiple joints   • Psoriasis   • Radiculopathy   • Acquired hypothyroidism   • Peripheral neuropathy   • Normocytic anemia   • History of right breast cancer   • Omental mass   • Metastatic breast cancer (HCC)   • Elevated serum creatinine   • Iron deficiency anemia   • Anemia in stage 3 chronic kidney disease (HCC)   • Stage 3b chronic kidney disease (HCC)   • Anxiety and depression   • RYAN (acute kidney injury) (HCC)   • Anemia, chronic disease   • Diastolic CHF, chronic (HCC)   • Frequent UTI   • Metabolic acidosis   • Severe malnutrition (HCC)   • Hydronephrosis   • Generalized weakness   • COVID-19 virus detected   • Hypocalcemia   • Morbid obesity with BMI of 50.0-59.9, adult (HCC)   • ESRD (end stage renal disease) (HCC)   • Pancytopenia due to chemotherapy (HCC)   • Chronic anemia   • Rectal bleeding   • Bicuspid aortic valve   • Complicated UTI (urinary tract infection)   • Acute UTI (urinary tract infection)   • Severe aortic stenosis   • Combined systolic and diastolic congestive heart failure (HCC)   • Depression   • ANDREW (obstructive sleep apnea)   • Diastolic CHF, chronic (HCC)   • Adverse effect of chemotherapy     Past Medical History:   Diagnosis Date   • Anemia    • Anxiety  and depression    • Bicuspid aortic valve    • Breast cancer (HCC)     RIGHT, HAD NEELA. MASTECTOMY, CHEMO AND RADIATION   • CHF (congestive heart failure) (HCC)    • CKD (chronic kidney disease)    • Diabetes mellitus (HCC)    • [facility] patient (HCC)     [facility]   • Elevated cholesterol    • Frequent UTI    • Heart murmur    • History of kidney stones    • History of MRSA infection 2005    POST BREAST SURGERY-TREATED [facility]   • History of sepsis     KIDNEY STONE   • History of transfusion    • Hyperlipidemia    • Hypertension    • Hyperthyroidism    • Hypothyroidism    • Kidney stone     CURRENT LEFT-DEC 2021   • Lymphedema of leg     LEFT   • Metastasis from breast cancer (HCC)     STOMACH-OMENTUM   • Neuromuscular disorder (HCC)    • Obesity    • ANDREW on CPAP     CPAP   • Osteoarthrosis    • Peripheral neuropathy     FEET BILAT   • Radiculopathy    • Rectal bleeding 2022   • Thickened endometrium    • Weakness     BILAT LEGS-WITH ANY AMT OF TIME     Past Surgical History:   Procedure Laterality Date   • ARTERIOVENOUS FISTULA/SHUNT SURGERY Left 2021    Procedure: LEFT  BRACHIOCEPALIC ARTERIOVENOUS FISTULA;  Surgeon: Dejuan Adler MD;  Location: [facility] MAIN OR;  Service: Vascular;  Laterality: Left;   • BREAST RECONSTRUCTION      WITH IMPLANTS, AND REVISION, NOW REMOVED   • BREAST SURGERY     • CARDIAC CATHETERIZATION N/A 2022    Procedure: CORONARY ANGIOGRAPHY;  Surgeon: Luis Rivers MD;  Location: [facility] CATH INVASIVE LOCATION;  Service: Cardiology;  Laterality: N/A;   • CARDIAC CATHETERIZATION N/A 2022    Procedure: Right Heart Cath;  Surgeon: Luis Rivers MD;  Location: [facility] CATH INVASIVE LOCATION;  Service: Cardiology;  Laterality: N/A;   • CATARACT EXTRACTION WITH INTRAOCULAR LENS IMPLANT Bilateral    •  SECTION  1987   •  SECTION  1987   • CYSTOSCOPY W/ URETERAL STENT PLACEMENT     •  CYSTOSCOPY W/ URETERAL STENT PLACEMENT Left 12/16/2021    Procedure: CYSTOSCOPY KEFT RETROGRADE PYELOGRAM  LEFT  URETERAL STENT PLACEMENT;  Surgeon: Leodan Mckee Jr., MD;  Location: [facility] MAIN OR;  Service: Urology;  Laterality: Left;   • CYSTOSCOPY W/ URETERAL STENT PLACEMENT Left 03/10/2022    Procedure: CYSTOSCOPY AND LEFT URETERAL STENT EXCHANGE, RETROGRADE PYELOGRAM;  Surgeon: Leodan Mckee Jr., MD;  Location: [facility] MAIN OR;  Service: Urology;  Laterality: Left;   • D & C HYSTEROSCOPY N/A 05/22/2017    Procedure: DILATATION AND CURETTAGE, HYSTEROSCOPY ;  Surgeon: Cherie Oliveros MD;  Location:  [facility];  Service:    • D & C HYSTEROSCOPY N/A 09/19/2019    Procedure: DILATATION AND CURETTAGE HYSTEROSCOPY/POLYPECTOMY;  Surgeon: Cherie Oliveros MD;  Location:  [facility];  Service: Gynecology   • INSERTION AND REMOVAL HEMODIALYSIS CATHETER Right 05/01/2021   • INSERTION HEMODIALYSIS CATHETER Right 05/01/2021    Procedure: HEMODIALYSIS CATHETER INSERTION;  Surgeon: Clint Hernández MD;  Location: [facility] MAIN OR;  Service: Vascular;  Laterality: Right;   • LYMPH NODE BIOPSY     • MASTECTOMY Bilateral 2004      General Information     Row Name 01/06/23 1034          Physical Therapy Time and Intention    Document Type therapy note (daily note)  -EM     Mode of Treatment individual therapy;physical therapy  -EM     Row Name 01/06/23 1034          General Information    Existing Precautions/Restrictions fall  -EM           User Key  (r) = Recorded By, (t) = Taken By, (c) = Cosigned By    Initials Name Provider Type    EM Kymberly Shine PT Physical Therapist               Mobility     Row Name 01/06/23 1035          Bed Mobility    Supine-Sit [place] (Bed Mobility) minimum assist (75% patient effort);verbal cues  -EM     Assistive Device (Bed Mobility) head of bed elevated  -EM     Comment, (Bed Mobility) extra time to scoot to EOB  -EM     Row Name 01/06/23 1035          Bed-Chair  Transfer    Bed-Chair [place] (Transfers) minimum assist (75% patient effort)  -EM     Assistive Device (Bed-Chair Transfers) walker, front-wheeled  -EM     Comment, (Bed-Chair Transfer) few steps around from bed to chair  -EM     Row Name 01/06/23 1035          Sit-Stand Transfer    Sit-Stand [place] (Transfers) minimum assist (75% patient effort)  -EM     Assistive Device (Sit-Stand Transfers) walker, front-wheeled  -EM           User Key  (r) = Recorded By, (t) = Taken By, (c) = Cosigned By    Initials Name Provider Type    Kymberly Harding PT Physical Therapist               Obj/Interventions     Row Name 01/06/23 1036          Motor Skills    Therapeutic Exercise other (see comments)  AP, LAQ, marching in sitting x 10 reps  -EM           User Key  (r) = Recorded By, (t) = Taken By, (c) = Cosigned By    Initials Name Provider Type    Kymberly Harding PT Physical Therapist               Goals/Plan    No documentation.                Clinical Impression     Row Name 01/06/23 1036          Pain    Pretreatment Pain Rating 0/10 - no pain  -EM     Row Name 01/06/23 1036          Plan of Care Review    Plan of Care Reviewed With patient  -EM     Outcome Evaluation Patient awake and alert, agreeable to therapy, wants to get up out of bed. Patient performed bed mobility with Avi, sit to stand with Avi, took a couple steps around from bed to chair with Avi and rwx. Patient demonstrates improvement with mobility today, anticipate d/c home. Pt expresses no concerns about mobility at home, states her  will help her.  -EM     Row Name 01/06/23 1036          Therapy Assessment/Plan (PT)    Therapy Frequency (PT) 5 times/wk  -EM     Row Name 01/06/23 1036          Positioning and Restraints    Pre-Treatment Position in bed  -EM     Post Treatment Position chair  -EM     In Chair sitting;call light within reach;notified nsg  -EM           User Key  (r) = Recorded By, (t) = Taken By, (c) =  Cosigned By    Initials Name Provider Type     Kymberly Shine PT Physical Therapist               Outcome Measures     Row Name 01/06/23 1040          How much help from another person do you currently need...    Turning from your back to your side while in flat bed without using bedrails? 3  -EM     Moving from lying on back to sitting on the side of a flat bed without bedrails? 3  -EM     Moving to and from a bed to a chair (including a wheelchair)? 3  -EM     Standing up from a chair using your arms (e.g., wheelchair, bedside chair)? 3  -EM     Climbing 3-5 steps with a railing? 2  -EM     To walk in hospital room? 3  -EM     AM-PAC 6 Clicks Score (PT) 17  -EM     Highest level of mobility 5 --> Static standing  -EM           User Key  (r) = Recorded By, (t) = Taken By, (c) = Cosigned By    Initials Name Provider Type     Kymberly Shine PT Physical Therapist                             Physical Therapy Education     Title: PT OT SLP Therapies (Done)     Topic: Physical Therapy (Done)     Point: Mobility training (Done)     Learning Progress Summary           Patient Acceptance, E, VU by  at 1/6/2023 1040    Acceptance, E,TB,D, VU,NR by  at 1/5/2023 1621                   Point: Home exercise program (Done)     Learning Progress Summary           Patient Acceptance, E, VU by  at 1/6/2023 1040    Acceptance, E,TB,D, VU,NR by  at 1/5/2023 1621                   Point: Body mechanics (Done)     Learning Progress Summary           Patient Acceptance, E,TB,D, VU,NR by  at 1/5/2023 1621                   Point: Precautions (Done)     Learning Progress Summary           Patient Acceptance, E,TB,D, VU,NR by  at 1/5/2023 1621                               User Key     Initials Effective Dates Name Provider Type Discipline     06/16/21 -  Rayna Dooley PT Physical Therapist PT     06/16/21 -  Kymberly Shine PT Physical Therapist PT              PT Recommendation and Plan      Plan of Care Reviewed With: patient  Outcome Evaluation: Patient awake and alert, agreeable to therapy, wants to get up out of bed. Patient performed bed mobility with Avi, sit to stand with Avi, took a couple steps around from bed to chair with Avi and rwx. Patient demonstrates improvement with mobility today, anticipate d/c home. Pt expresses no concerns about mobility at home, states her  will help her.     Time Calculation:    PT Charges     Row Name 01/06/23 1042             Time Calculation    Start Time 0934  -EM      Stop Time 0948  -EM      Time Calculation (min) 14 min  -EM      PT Received On 01/06/23  -EM      PT - Next Appointment 01/09/23  -EM         Time Calculation- PT    Total Timed Code Minutes- PT 14 minute(s)  -EM         Timed Charges    86119 - PT Therapeutic Exercise Minutes 5  -EM      93341 - PT Therapeutic Activity Minutes 9  -EM         Total Minutes    Timed Charges Total Minutes 14  -EM       Total Minutes 14  -EM            User Key  (r) = Recorded By, (t) = Taken By, (c) = Cosigned By    Initials Name Provider Type    EM Kymberly Shine PT Physical Therapist              Therapy Charges for Today     Code Description Service Date Service Provider Modifiers Qty    [id number]  PT THERAPEUTIC ACT EA 15 MIN 1/6/2023 Kymberly Shine PT GP 1          PT G-Codes  Outcome Measure Options: AM-PAC 6 Clicks Basic Mobility (PT)  AM-PAC 6 Clicks Score (PT): 17       Kymberly Shine PT  1/6/2023

## 2023-01-06 NOTE — PROGRESS NOTES
[facility] INPATIENT PROGRESS NOTE      CC:Metastatic lobular breast cancer (ER/PA positive, HER-2/tj negative), anemia secondary to CKD3, CHF, peripheral neuropathy, chemotherapy related nausea chemotherapy-induced myelosuppression        HISTORY OF PRESENT ILLNESS:  Juana Hall is a 62 y.o. female who we are asked to see today in consultation for her history of metastatic lobular carcinoma.  Her treatment for this includes Faslodex, Verzenio that has been increased in October though with associated anemia and was assessed 12/8/2022 with plans to undergo a follow-up PET/CT in 3 weeks.  This PET/CT 12/29/2022 showed large masslike uptake overlying the right axilla right mastectomy operative bed poss related to injection, scattered focal FDG avid lesions per osseous assessment with the majority demonstrating degree of avidity decreased from previous.  The patient has had additional issues with anemia secondary end-stage renal disease, her malignancy/chronic disease and myelosuppression from CDK 4/6 inhibitor and GI blood loss with hemoglobin approximately 7 g% requiring transfusion when she is at this level.  She was transfused 12/9/2022 she continues on home dialysis 5 days/week x 2 hours and is also followed by cardiology with CHF and aortic stenosis but without plans for TAVR considering her her life expectancy.  Please note, additionally, she has peripheral neuropathy in bilateral lower extremities and left upper, nausea, issues with depression, left perinephric stranding secondary malignancy and hydronephrosis stable, right thyroid nodules stable, previous issues with hypocalcemia and was hospitalized in late January 2022 secondary to COVID.     She presented to the ER 1/3/2022 with generalized weakness over the last several weeks, potential urinary tract infection and underwent a number of studies demonstrating abnormal urinalysis, culture pending, blood cultures pending, BUN/creatinine of 27  and 5.29, calcium 7.8, H&H of 7.0 and 20.7.  Patient given ceftriaxone in emergency room.     Interval history:     1/4/2023  T98.1, pulse 60, respirations 18, /67, weight 368 pounds, H&H 7.0 and 20.7, platelet count of 90,000  Patient is seen in observation unit and we have discussed her issues to date including her PET/CT findings which are improved for bony findings with an artifact in the right chest that would be consistent with her previous radiation site in artifactual uptake.  The patient and her  are frustrated about whether TAVR recommendations have been completed with her symptoms currently exacerbated by her significant anemia.    1/5/2023  T98.7, pulse 60, respirations 16, BP 93/45  Patient feeling considerably improved in general, now being transported for dialysis  H&H 9.1 and 27.8, white count of 3920, platelet count of 100,000, BUN/creatinine of 35 and 5.65  Patient seen by nephrology with plans to continue dialysis in house, high-dose Epogen with hemodialysis and midodrine for BP support.  Additionally reviewed by the structural heart team and not felt to be a candidate for TAVR immediately though balloon aortic valvuloplasty could potentially be helpful.    1/6/2023  T98.2, pulse 56, respirations 16, /52  Patient generally feeling improved  1/5/2022 patient proceeded to HD, notation from cardiology about whether proceed with valvuloplasty as inpatient       Medications:  The current medication list was reviewed in the EMR.    Allergies:  No Known Allergies    Objective      Vitals:    01/06/23 0727   BP: 116/52   Pulse: 56   Resp: 16   Temp: 98.2 °F (36.8 °C)   SpO2: 100%        Physical exam:   GENERAL:  Well-developed, well-nourished, morbidly obese in no acute distress.   SKIN:  Warm, dry without rashes, purpura or petechiae.  HEAD:  Normocephalic.  EYES:  Pupils equal, round and reactive to light.  EOMs intact.  Conjunctivae normal.  EARS:  Hearing intact.  NOSE:  Septum  midline.  No excoriations or nasal discharge.  MOUTH:  Tongue is well-papillated; no stomatitis or ulcers.  Lips normal.  THROAT:  Oropharynx without lesions or exudates.  NECK:  Supple with good range of motion; no thyromegaly or masses, no JVD.  LYMPHATICS:  No cervical, supraclavicular, axillary or inguinal adenopathy.  CHEST: Radiation dermatitis right upper anterior chest wall, lungs clear to percussion and auscultation. Good airflow.  CARDIAC:  Regular rate and rhythm with 3/6 holosystolic murmur, rubs or gallops. Normal S1,S2.  ABDOMEN:  Soft, nontender with no organomegaly or masses.  EXTREMITIES:  No clubbing, cyanosis but lymphedema present lower extremities associated with her obesity.  NEUROLOGICAL:  Cranial Nerves II-XII grossly intact.  No focal neurological deficits.  PSYCHIATRIC:  Normal affect and mood    RECENT LABS:    Results from last 7 days   Lab Units 01/05/23  0602 01/04/23  0650 01/03/23  2317   WBC 10*3/mm3 3.92 3.84 4.61   HEMOGLOBIN g/dL 9.1* 7.0* 7.8*   HEMATOCRIT % 27.8* 20.7* 24.0*   PLATELETS 10*3/mm3 100* 90* 98*   MONOCYTES % %  --  3.1*  --      Results from last 7 days   Lab Units 01/05/23  0602 01/04/23  0650 01/03/23  2317   SODIUM mmol/L 139  131* 135* 134*   POTASSIUM mmol/L 3.2*  3.1* 2.8* 3.0*   CHLORIDE mmol/L 99  95* 94* 97*   CO2 mmol/L 25.2  25.1 27.8 29.0   BUN mg/dL 35*  36* 27* 25*   CREATININE mg/dL 5.65*  6.33* 5.29* 4.98*   CALCIUM mg/dL 8.0*  8.2* 7.8* 8.3*   BILIRUBIN mg/dL  --   --  0.6   ALK PHOS U/L  --   --  80   ALT (SGPT) U/L  --   --  6   AST (SGOT) U/L  --   --  12   GLUCOSE mg/dL 88  90 194* 201*     Results from last 7 days   Lab Units 01/05/23  0602 01/03/23  2317   MAGNESIUM mg/dL 1.9 2.0     This is a 62 y.o. female with:     *Metastatic lobular breast cancer (ER/CT positive, HER-2/tj negative):  • Previous stage IIIC right breast cancer in 2003, received neoadjuvant chemotherapy (unknown regimen) with subsequent bilateral mastectomies  1/22/2004 with right axillary dissection.  Residual 10-12 cm invasive lobular carcinoma on the right breast with 14/16+ nodes with extranodal extension.  Received adjuvant carboplatin and Navelbine chemotherapy x4 cycles  • Attempted adjuvant radiation to the chest wall however interrupted due to cellulitis that required removal of implants in August 2004  • Received tamoxifen from 6/22/2004 until June 2009.  Transitioned to Femara and received until June 2014  • Identification of CT findings concerning for carcinomatosis on CT scans and June 2019.  • PET scan confirmed hypermetabolic activity in the omentum as well as small retroperitoneal lymph nodes.  • CT-guided omental biopsy 7/30/2019 with metastatic lobular carcinoma of breast primary, ER positive (greater than 95%), NH positive (90%), HER-2/tj negative (1+ IHC)  • [facility] liquid biopsy 2/5/2020: MSI undetermined, mutations in BETH, NF1, CHEK2.  No evidence of PIK3CA mutation.  • Subsequent [facility] germline testing 11/12/2021 with BETH VUS (c.2864C>A) and POLE VUS (c.631A>T)  • Initiation of Aromasin and Ibrance in August 2019  • Difficulty with myelosuppression related to Ibrance requiring dose alterations, eventually changed to 100 mg for 7 days on followed by 7 days off.  • CT chest abdomen pelvis 10/26/2021 with increase in left hydronephrosis with increase in left perinephric and periureteral stranding. Decrease in stone in the left kidney lower pole (7 mm).  Stable small retroperitoneal lymph and left periaortic lymph nodes.  • PET scan 11/10/2021 with multiple bilateral hypermetabolic nodular pulmonary lesions, asymmetric moderate to severe left hydronephrosis with ill-defined fat stranding and soft tissue thickening in the renal sinus and surrounding the left ureter, hypermetabolic left retroperitoneal lymph node with slight increase in size, ill-defined hypermetabolic thickening in the inferior omentum with increase in size, uptake and C7  (indeterminate, recommended MRI).  Short segments of uptake in the mid and distal esophagus possibly related to gastritis.  • PET scan findings felt to be consistent with progressive malignancy.  Patient declined repeat omental biopsy.   • Options for further treatment discussed on 11/12/2021.  In light of renal failure and dialysis, options are more limited.  Recommendation to continue Ibrance and discontinue Aromasin, begin Faslodex.  Discussed possible future use of single agent Taxol.    • Aromasin discontinued 11/12/2021  • On 11/22/2021 initiation of Faslodex with continuation of Ibrance (100 mg daily for 7 days on followed by 7 days off).  • MRI cervical spine 11/18/2021 showed the right C7 normality to likely represent metastatic disease.  With solitary bone lesion, did not recommend pursuit of bone modifying agent.  • Following 2 cycles Faslodex/Ibrance, PET scan on 1/11/2022 showed no change in the soft tissue superior to the dome of the bladder with hypermetabolic activity (likely related to carcinomatosis).  Significant decrease in injection of fat around the left renal pelvis and stable retroperitoneal hypermetabolic lymph nodes, decrease in size and activity in small bilateral pulmonary nodules.  Findings felt to represent evidence of response to treatment.    • Ibrance held briefly beginning 1/28/2022 due to hospitalization with COVID-19 infection and C. difficile colitis.  Patient developed leukopenia/neutropenia.  Ibrance resumed at previous dosing (100 mg daily 7 days on followed by 7 days off) on 2/9/22.  • Following 5 cycles Faslodex/Ibrance PET scan 4/19/2022 with evidence of mixed response.  New hypermetabolic lesion spinous process L2 (SUV 5.1) and slight increase in activity left clavicular metastasis (SUV increased 4.6-8.1).  C7 hypermetabolic mixed lytic and blastic bone lesion was unchanged.  Decrease in uptake involving omental thickening.  Stable soft tissue thickening around the left  renal pelvis and ureter.  Discussion again regarding potential pursuit of IV chemotherapy with Taxol versus continuation of Ibrance and Faslodex with radiation to sites of bony disease at L2, left clavicle, C7.  Patient opted to defer initiation of systemic chemotherapy and continue Ibrance/Faslodex with consideration of radiation.  • Initiated monthly Xgeva on 5/19/2022 (cleared with nephrology).  Xgeva subsequently held due to significant hypocalcemia.  Decision made to forego further Xgeva with persistent hypocalcemia.  • Palliative radiation received to lumbar spine regarding L2 metastasis 5/23- 6/7/2022  • Ibrance held during radiation therapy beginning 5/22/2022.  Ibrance resumed 6/16/2022.  • PET scan 7/21/2022 with stable hypermetabolic abdominal pelvic lymph nodes and subcarinal lymph node, stable bone lesions at C7, left clavicle.  Stable soft tissue thickening around the left renal pelvis with left hydronephrosis.  Uptake in adrenal glands felt to be likely reactive (no change in size).  No activity noted at L2 (previously radiated).  New hypermetabolic lesion right anterior sixth rib.  • With evidence of further slight progression in bone on PET scan (new right sixth rib lesion), on 7/28/2022 discontinued Ibrance and plan to transition to abemaciclib with continuation of Faslodex.    • On 8/4/2022 initiated abemaciclib at reduced dose of 50 mg twice daily.  • On 8/25/2022, increased abemaciclib dose to 100 mg twice daily  • PET scan 10/6/2022 with stable findings with exception of left acetabular activity report noted new activity however on prior PET scan question of focal activity present previously.  No corresponding CT abnormality and significant increase in SUV at sacral lesion.  Scan otherwise stable.  Decision to continue current treatment  • Abemaciclib dose increased on 10/13/2022 up to 150 mg twice daily  • Patient returns today in follow-up due for cycle 14 Faslodex 500 mg IM every 4 weeks and  continuing on abemaciclib 150 mg twice daily that was initiated 8/22 at 50 mg daily with dose increase on 8/25/2022 to 100 mg twice daily, and further increase to 150 mg twice daily on 10/13/2022.  Patient is tolerating the dose increase well with no overt changes in her stool patterns.  She is currently having significant more fatigue with dropping hemoglobin as further discussed below.  We will proceed with transfusion.  Otherwise she will undergo repeat PET scan in 3 weeks and then follow-up with Dr. Irvin in 4 weeks for scan review and cycle 15 Faslodex pending scan results  • Subsequent PET/CT with masslike area overlying the right axilla?  Injection site, reduction in avid bony lesions left acetabulum, left distal clavicle, and C7 vertebral body, no additional other findings compared to previous.  • Patient seen in observation unit 1/4/2023 which would be day of treatment, now rescheduled to 1/19/2023 with Dr. Irvin, Faslodex     *Anemia secondary to ESRD, underlying malignancy/chronic disease, myelosuppression from CDK 4/6 inhibitor, GI blood loss:  • Anemia secondary to ESRD, malignancy with exacerbation by use of Ibrance  • Previous evidence of folate deficiency 8/12/2019 with level 3.84, initiated folic acid 1 mg daily  • Previous evidence of iron deficiency, received Injectafer on 8/7/2020 750 mg IV x1 dose.  Second dose not administered due to onset of fever, subsequent iron studies precluded further administration of IV iron.  • Previous low normal B12 level initiated oral B12 1000 mcg daily.  • Patient had previously received Procrit and required intermittent transfusion support  • Following initiation of hemodialysis, management of BEAU transitioned to nephrology, receiving Epogen with dialysis.  • Ongoing transfusion support prompted alteration in Ibrance dosing on 8/23/2021.  • After alteration in Ibrance dosing, hemoglobin improved and remained stable in the 9-10 range.  • Improved but ongoing  intermittent need for transfusion support despite Ibrance dose alteration  • Stool negative for occult blood x3 on 11/2/2021  • Patient with reduced dialysis frequency to 2 days/week with concurrent decrease in Epogen dosing corresponding again to increased transfusion requirement.  • Epogen dose increased per nephrology to 20,000 units twice weekly with dialysis on Mondays and Fridays  • Ibrance again exacerbating anemia with ongoing intermittent transfusion requirements.  Ibrance subsequently discontinued on 7/28/2022 and patient initiated abemaciclib on 8/4/2022 which may be less myelosuppressive.  • Additional transfusions required with hemoglobin on 3/31/2022 6.6, hemoglobin 4/21/2022 of 6.4, hemoglobin on 5/5/2022 of 6.8, hemoglobin 6.4 on 7/14/2022, hemoglobin 6.4 on 7/28/2022, hemoglobin 6.8 on 8/18/2022.  • Patient did develop transient visible blood in stool in July 2022  • Anemia evaluation 7/28/2022 with iron 32, ferritin 412, iron saturation 15%, TIBC 210, folate 19.9, B12 925, haptoglobin 300, .  • Stool positive for occult blood x3 on 8/1/2022  • Patient was seen by GI on 8/16/2022, felt to be high risk for endoscopic evaluation and elected to forego EGD/colonoscopy for now  • Patient transitioned from hemodialysis in clinic to home hemodialysis 5 days/week x 2 hours beginning 8/29/2022.  With transition to home dialysis, nephrology transition the patient from Epogen to Mircera administered every 4 weeks in their office, initiated 8/22/2022.  • Patient currently continues Mircera every 4 weeks.  She did need a blood transfusion after hemoglobin on 10/27/2022 was 6.6.  • Hemoglobin today 7.1.  Patient is symptomatic with extreme fatigue today, requesting to go ahead with transfusion.  • Patient seen in observation 1/4/2022 after admission with weakness, hemoglobin 7.0, medic at 20.7, 2 unit transfusion anticipated  • Patient improved post transfusion H&H of 9.1 and 27.8, hemodialysis to continue  with high-dose Epogen     *ESRD on HD:  • Patient with previous CKD3/4  • Hospitalization 4/29-5/4/2021 with RYAN/CKD, UTI, anemia.  Required initiation of hemodialysis Tuesday Thursday Saturday.   • Decrease in frequency of dialysis to twice per week.  She continues to produce a significant amount of urine.   • Status post left upper extremity AV fistula placement on 11/3/2021 by vascular surgery  • Attempt to hold further dialysis on 1/11/2022 with close monitoring of her laboratory and clinical parameters.  Dialysis quickly resumed due to development of hyperkalemia.  • Patient was undergoing dialysis 2 days/week on Mondays and Fridays.    • On 8/22/2022 patient transitioned to use of home dialysis device 5 days/week x 2 hours.  • During observation stay dialysis continues through nephrology     *CHF with severe aortic stenosis:  • Echocardiogram 7/12/2019 showing ejection fraction 48% with moderate dilation of the LV cavity, global hypokinesis, grade 2 diastolic dysfunction, mild to moderate aortic stenosis, trivial pericardial effusion.  • Echocardiogram 7/19/2021 with ejection fraction 56%, left atrial volume moderately increased, grade 2 diastolic dysfunction, severe calcification aortic valve, moderate aortic stenosis, severe mitral calcification, mild mitral stenosis, marked elevation in RVSP from tricuspid regurgitation.  • Echocardiogram on 7/13/2022 with ejection fraction 56-60%, grade 2 diastolic dysfunction, severe aortic stenosis.    • Cardiac catheterization 8/23/2022 showed normal coronary arteries, mild to moderate pulmonary hypertension.    • She was evaluated by cardiothoracic surgery Dr. Pagan and there was discussion regarding potential candidacy for TAVR although there was concern given her underlying comorbidities including her metastatic breast cancer.  I discussed patient's prognosis with Dr. Pagan.  She does have opportunities for additional treatment of her malignancy with intravenous  chemotherapy and is willing to pursue this in the future when needed.  It is unclear as to her expected survival however given her multiple severe comorbidities with metastatic breast cancer, ESRD on dialysis, severe aortic stenosis, severe anemia.  There is consideration of possible pursuit of balloon valvuloplasty initially to assess her symptomatic response and then consider pursuit of TAVR in the future.   • Patient is awaiting further decision from cardiology as to recommendations for further management.  She was seen on 10/25/2022 but reports she was told that her life expectancy is not long enough for procedure.  She will follow-up in January 2023 and discussed once again.  • Plan to request this assessment during patient's observation stay  • Patient reviewed by Dr. Rivers-potential aortic valvuloplasty to be considered during hospital stay or as outpatient     *Left upper and bilateral lower extremity peripheral neuropathy:  • Patient reports that left upper extremity neuropathy was not present from previous chemotherapy but occurred after hospitalization that required intubation and critical care stay.  Apparently felt to represent critical care neuropathy.  Improved over time.  • Bilateral lower extremity neuropathy felt to be related to diabetes  • May have future implications regarding treatment for breast cancer.  • Patient reports that peripheral neuropathy symptoms continue in the bilateral lower extremities, unchanged.  This will be a consideration with potential future use of Taxol     *Uterine/endometrial activity on PET scan:  • Patient experienced postmenopausal vaginal bleeding in 2013, negative endometrial biopsy and gynecologic evaluation.  • With findings on PET scan with enlarging uterus and some hypermetabolic activity, referred back to Dr. Oliveros in gynecology.    • 9/19/2019 D&C with hysteroscopy by Dr. Oliveros, no significant intraoperative findings, pathologic results with benign  findings, no evidence of malignancy.     *Nausea:  • Mild, relieved with Zofran.   • Patient experienced improvement in nausea after initiating hemodialysis  • No recent nausea     *Myelosuppression secondary to CDK 4/6 inhibitor:  • Patient was able to maintain adequate WBC after dosing alteration on Ibrance to treatment at 100 mg daily for 7 days on followed by 7 days off.  • Subsequent transition from Ibrance to abemaciclib  • Today, WBC 2.97.  Stable  • Assessment 1/4/2022 white count of 3920     *Depression  • Patient with history of depression, worsened after the death of her son in November 2021  • With evidence of progressive malignancy in November 2021, patient agreeable to referral to supportive oncology  • Patient currently receiving gabapentin 300 mg nightly, Zoloft 150 mg daily, armodafinil 250 mg daily  • Patient does continue with difficulties regarding depression that wax and wane.  Currently symptoms under fair control.  Patient last seen by supportive oncology on 9/26/2022.     *Left perinephric stranding secondary to malignancy with hydronephrosis:  • CT 4/22/2021 showed interval enlargement of 2 staghorn calculi in the left kidney with persistent left perinephric stranding  • Hospitalization 4/29-5/4/2021 with RYAN/CKD, UTI, anemia.  Required 2 unit PRBC transfusion and initiated hemodialysis Tuesday Thursday Saturday.    • Discussion from urology regarding potential need for surgical procedure to address staghorn calculus left kidney  • CT scan 7/2/2021 with only 1 remaining left renal stone identified which had decreased in size.  Patient appears to have passed the other stone.  • As above, CT 10/26/2021 with more prominent left hydronephrosis and increase in left perinephric and periureteral stranding.  Felt to possibly be related to passage of recent stone versus partial obstruction secondary to debris or thrombus in the left ureter versus pyelonephritis.  Patient however with no clinical  evidence of recent stone passage nor pyelonephritis. Malignancy felt to be the cause of CT changes around the left kidney at this point.   • Left ureteral stent replacement 12/16/2021  • PET scan 1/11/2022 with decrease in fat injection near left renal pelvis, stent in satisfactory position.  Mild residual hydronephrosis on the left.  • Ureteral stent replaced on 3/10/2022  • PET scan 4/19/2022 with no hydronephrosis, left ureteral stent in place  • PET scan 7/21/2022 with persistent left hydronephrosis, ureteral stent in place  • PET scan 10/6/2022 with left nephroureteral stent in place, overall stable findings  • UTI documented 1/3/2023, empiric Rocephin given with improved symptoms by 1/5/2023     *Right thyroid nodules  • Patient underwent prior thyroid biopsy by her report with benign findings many years ago, records not available  • On MRI cervical spine 11/18/2021, finding of 1.8 cm right thyroid nodule  • Thyroid ultrasound 11/26/2021 with right-sided thyroid nodules, 1 nodule was hypoechoic, solid measuring 2 cm with biopsy recommended.  • Thyroid ultrasound results reviewed with patient.  In light of her metastatic breast cancer, renal failure the significance of the thyroid nodule is felt to be much less.  We discussed possibility of thyroid biopsy however patient is inclined to forego this, especially since she has had a biopsy performed in the past with benign findings by her report.    • No hypermetabolic activity identified on PET scan 1/11/2022 in the neck     *Hospitalization 1/28-1/30/2022 due to COVID-19 infection and C. difficile colitis  • Patient fully vaccinated with 3 doses Moderna COVID-19 vaccine  • Patient developed symptoms 1/26/2022 with abrupt onset sinus congestion, mild cough, subsequently developed nausea and diarrhea on 1/27/2022.  Significant fatigue.  • Patient tested positive in emergency department on 1/28/2022 and was admitted  • Patient maintained O2 saturation in mid 90%  range on room air  • Patient received remdesivir  • Patient was also found to be positive for C. difficile colitis, received course of oral vancomycin.  • Symptoms from both COVID-19 and C. difficile colitis ultimately resolved.     *Hypocalcemia  • Patient developed significant hypocalcemia after receiving Xgeva on 5/19/2022  • Calcium management per nephrology  • No further Xgeva planned due to persistent significant hypocalcemia  • Calcium currently 7.7           Tarik Diaz MD

## 2023-01-06 NOTE — PROGRESS NOTES
HOSPITAL FOLLOW UP NOTE    Patient Name: Juana Hall  Patient : 1960        Date of Service:23  Provider of Service: BETY Harmon  Place of Service: [facility]  Referral Provider: Ronald Tatum MD          Follow Up: Acute on chronic diastolic heart failure, severe degenerative aortic stenosis    Interval Hx: feeling good, no complaints except being hungry      OBJECTIVE  Temp:  [97.9 °F (36.6 °C)-100.5 °F (38.1 °C)] 98.2 °F (36.8 °C)  Heart Rate:  [55-72] 56  Resp:  [16] 16  BP: (102-119)/(47-86) 116/52     Intake/Output Summary (Last 24 hours) at 2023 0818  Last data filed at 2023 0600  Gross per 24 hour   Intake 100 ml   Output 2100 ml   Net -2000 ml     Body mass index is 58.01 kg/m².      23  0838 23  1331 23  0500   Weight: (!) 167 kg (368 lb 11.2 oz) (!) 166 kg (366 lb 10 oz) (!) 168 kg (370 lb 6 oz)         Physical Exam:     General Appearance:    Alert, cooperative, in no acute distress   Head:    Normocephalic, without obvious abnormality, atraumatic   Eyes:            Conjunctivae and sclerae normal, no   icterus, no pallor, corneas clear, PERRLA   Neck:   No adenopathy, supple, trachea midline, no thyromegaly, no   carotid bruit, no JVD   Lungs:     Clear to auscultation,respirations regular, even and unlabored    Heart:    Regular rhythm and normal rate, normal S1 and S2, no murmur, no gallop, no rub, no click   Chest Wall:    No abnormalities observed   Abdomen:     Normal bowel sounds, no masses, no organomegaly, soft, nontender, nondistended, no guarding, no rebound  tenderness   Extremities:   Moves all extremities well, + edema, no cyanosis, no redness   Pulses:   Pulses palpable and equal bilaterally.          CURRENT MEDS    Scheduled Meds:Armodafinil, 250 mg, Oral, Daily  atorvastatin, 40 mg, Oral, Nightly  cefTRIAXone, 1 g, Intravenous, Q24H  epoetin jana/jana-epbx, 20,000 Units, Intravenous, Once per day on Mon Wed  Fri  folic acid, 1,000 mcg, Oral, Daily  gabapentin, 300 mg, Oral, Nightly  heparin (porcine), 5,000 Units, Subcutaneous, Q8H  heparin (porcine), 2,000 Units, Intracatheter, Once  insulin glargine, 25 Units, Subcutaneous, QAM  insulin lispro, 0-7 Units, Subcutaneous, TID AC  levothyroxine, 50 mcg, Oral, Daily  midodrine, 5 mg, Oral, TID AC  sertraline, 150 mg, Oral, Daily  sodium chloride, 10 mL, Intravenous, Q12H  vitamin B-12, 1,000 mcg, Oral, Daily      Continuous Infusions:       Lab Review:   Results from last 7 days   Lab Units 01/05/23  0602 01/04/23  0650 01/03/23  2317   SODIUM mmol/L 139  131* 135* 134*   POTASSIUM mmol/L 3.2*  3.1* 2.8* 3.0*   CHLORIDE mmol/L 99  95* 94* 97*   CO2 mmol/L 25.2  25.1 27.8 29.0   BUN mg/dL 35*  36* 27* 25*   CREATININE mg/dL 5.65*  6.33* 5.29* 4.98*   GLUCOSE mg/dL 88  90 194* 201*   CALCIUM mg/dL 8.0*  8.2* 7.8* 8.3*   AST (SGOT) U/L  --   --  12   ALT (SGPT) U/L  --   --  6         Results from last 7 days   Lab Units 01/05/23  0602 01/04/23  0650   WBC 10*3/mm3 3.92 3.84   HEMOGLOBIN g/dL 9.1* 7.0*   HEMATOCRIT % 27.8* 20.7*   PLATELETS 10*3/mm3 100* 90*     Results from last 7 days   Lab Units 01/04/23  0650   INR  1.31*     Results from last 7 days   Lab Units 01/05/23  0602 01/03/23  2317   MAGNESIUM mg/dL 1.9 2.0                   2D Echo 07/13/2022:  • Left ventricular ejection fraction appears to be 56 - 60%. Left ventricular systolic function is normal.  • Left ventricular wall thickness is consistent with mild concentric hypertrophy  • Left ventricular diastolic function is consistent with (grade II w/high LAP) pseudonormalization.  • Normal right ventricular cavity size and systolic function noted.  • The left atrial cavity is mildly dilated.  • Severe aortic valve stenosis is present.  • Aortic valve area is 0.84 cm2. Peak velocity of the flow distal to the aortic valve is 449.3 cm/s. Aortic valve maximum pressure gradient is 80.8 mmHg. Aortic valve mean  pressure gradient is 45.9 mmHg  • There is severe mitral annular calcification the mitral valve leaflets are thickened and appear to be restricted in motion  • Moderate mitral valve stenosis is present. The mitral valve peak gradient is 16.2 mmHg. The mitral valve mean gradient is 6.2 mmHg.  • Mild tricuspid valve regurgitation is present.  • Calculated right ventricular systolic pressure from tricuspid regurgitation is 38 mmHg.  • There is no evidence of pericardial effusion      ASSESSMENT & PLAN    Acute UTI (urinary tract infection)    Diabetes type 2, controlled (HCC)    Hyperlipidemia    Hypertension    Normocytic anemia    Metastatic breast cancer (HCC)    Morbid obesity with BMI of 50.0-59.9, adult (HCC)    ESRD (end stage renal disease) (HCC)    Hypokalemia    Severe aortic stenosis    Depression    ANDREW (obstructive sleep apnea)    Diastolic CHF, chronic (HCC)    Adverse effect of chemotherapy    1.  Severe degenerative aortic valve stenosis: Balloon aortic valvuloplasty to be sched for Tuesday depending on clinical course with severe anemia and UTI  2.  Chronic diastolic heart failure: EF 56-60%, Laureen aortic stenosis, moderate mitral stenosis  3.  Anemia secondary to ESRD: Requiring PRBC transfusion.  Hemoglobin 7.0-> 9.1 after transfusion yesterday.    4.  Metastatic lobular breast cancer  5.  End-stage renal disease: On hemodialysis daily at home  6.  Morbid obesity    Okay from cardiology standpoint to discharge home and return for balloon aortic valvuloplasty on Tuesday.        Sherine Badillo, APRN  01/06/23

## 2023-01-06 NOTE — H&P (VIEW-ONLY)
HOSPITAL FOLLOW UP NOTE    Patient Name: Juana Hall  Patient : 1960        Date of Service:23  Provider of Service: BETY Harmon  [facility] of Service[facility]  Referral Provider: Ronald Tatum MD          Follow Up: Acute on chronic diastolic heart failure, severe degenerative aortic stenosis    Interval Hx: feeling good, no complaints except being hungry      OBJECTIVE  Temp:  [97.9 °F (36.6 °C)-100.5 °F (38.1 °C)] 98.2 °F (36.8 °C)  Heart Rate:  [55-72] 56  Resp:  [16] 16  BP: (102-119)/(47-86) 116/52     Intake/Output Summary (Last 24 hours) at 2023 0818  Last data filed at 2023 0600  Gross per 24 hour   Intake 100 ml   Output 2100 ml   Net -2000 ml     Body mass index is 58.01 kg/m².      23  0838 23  1331 23  0500   Weight: (!) 167 kg (368 lb 11.2 oz) (!) 166 kg (366 lb 10 oz) (!) 168 kg (370 lb 6 oz)         Physical Exam:     General Appearance:    Alert, cooperative, in no acute distress   Head:    Normocephalic, without obvious abnormality, atraumatic   Eyes:            Conjunctivae and sclerae normal, no   icterus, no pallor, corneas clear, PERRLA   Neck:   No adenopathy, supple, trachea midline, no thyromegaly, no   carotid bruit, no JVD   Lungs:     Clear to auscultation,respirations regular, even and unlabored    Heart:    Regular rhythm and normal rate, normal S1 and S2, no murmur, no gallop, no rub, no click   Chest Wall:    No abnormalities observed   Abdomen:     Normal bowel sounds, no masses, no organomegaly, soft, nontender, nondistended, no guarding, no rebound  tenderness   Extremities:   Moves all extremities well, + edema, no cyanosis, no redness   Pulses:   Pulses palpable and equal bilaterally.          CURRENT MEDS    Scheduled Meds:Armodafinil, 250 mg, Oral, Daily  atorvastatin, 40 mg, Oral, Nightly  cefTRIAXone, 1 g, Intravenous, Q24H  epoetin jana/jana-epbx, 20,000 Units, Intravenous, Once per day on Mon Wed  Fri  folic acid, 1,000 mcg, Oral, Daily  gabapentin, 300 mg, Oral, Nightly  heparin (porcine), 5,000 Units, Subcutaneous, Q8H  heparin (porcine), 2,000 Units, Intracatheter, Once  insulin glargine, 25 Units, Subcutaneous, QAM  insulin lispro, 0-7 Units, Subcutaneous, TID AC  levothyroxine, 50 mcg, Oral, Daily  midodrine, 5 mg, Oral, TID AC  sertraline, 150 mg, Oral, Daily  sodium chloride, 10 mL, Intravenous, Q12H  vitamin B-12, 1,000 mcg, Oral, Daily      Continuous Infusions:       Lab Review:   Results from last 7 days   Lab Units 01/05/23  0602 01/04/23  0650 01/03/23  2317   SODIUM mmol/L 139  131* 135* 134*   POTASSIUM mmol/L 3.2*  3.1* 2.8* 3.0*   CHLORIDE mmol/L 99  95* 94* 97*   CO2 mmol/L 25.2  25.1 27.8 29.0   BUN mg/dL 35*  36* 27* 25*   CREATININE mg/dL 5.65*  6.33* 5.29* 4.98*   GLUCOSE mg/dL 88  90 194* 201*   CALCIUM mg/dL 8.0*  8.2* 7.8* 8.3*   AST (SGOT) U/L  --   --  12   ALT (SGPT) U/L  --   --  6         Results from last 7 days   Lab Units 01/05/23  0602 01/04/23  0650   WBC 10*3/mm3 3.92 3.84   HEMOGLOBIN g/dL 9.1* 7.0*   HEMATOCRIT % 27.8* 20.7*   PLATELETS 10*3/mm3 100* 90*     Results from last 7 days   Lab Units 01/04/23  0650   INR  1.31*     Results from last 7 days   Lab Units 01/05/23  0602 01/03/23  2317   MAGNESIUM mg/dL 1.9 2.0                   2D Echo 07/13/2022:  • Left ventricular ejection fraction appears to be 56 - 60%. Left ventricular systolic function is normal.  • Left ventricular wall thickness is consistent with mild concentric hypertrophy  • Left ventricular diastolic function is consistent with (grade II w/high LAP) pseudonormalization.  • Normal right ventricular cavity size and systolic function noted.  • The left atrial cavity is mildly dilated.  • Severe aortic valve stenosis is present.  • Aortic valve area is 0.84 cm2. Peak velocity of the flow distal to the aortic valve is 449.3 cm/s. Aortic valve maximum pressure gradient is 80.8 mmHg. Aortic valve mean  pressure gradient is 45.9 mmHg  • There is severe mitral annular calcification the mitral valve leaflets are thickened and appear to be restricted in motion  • Moderate mitral valve stenosis is present. The mitral valve peak gradient is 16.2 mmHg. The mitral valve mean gradient is 6.2 mmHg.  • Mild tricuspid valve regurgitation is present.  • Calculated right ventricular systolic pressure from tricuspid regurgitation is 38 mmHg.  • There is no evidence of pericardial effusion      ASSESSMENT & PLAN    Acute UTI (urinary tract infection)    Diabetes type 2, controlled (HCC)    Hyperlipidemia    Hypertension    Normocytic anemia    Metastatic breast cancer (HCC)    Morbid obesity with BMI of 50.0-59.9, adult (HCC)    ESRD (end stage renal disease) (HCC)    Hypokalemia    Severe aortic stenosis    Depression    ANDREW (obstructive sleep apnea)    Diastolic CHF, chronic (HCC)    Adverse effect of chemotherapy    1.  Severe degenerative aortic valve stenosis: Balloon aortic valvuloplasty to be sched for Tuesday depending on clinical course with severe anemia and UTI  2.  Chronic diastolic heart failure: EF 56-60%, Laureen aortic stenosis, moderate mitral stenosis  3.  Anemia secondary to ESRD: Requiring PRBC transfusion.  Hemoglobin 7.0-> 9.1 after transfusion yesterday.    4.  Metastatic lobular breast cancer  5.  End-stage renal disease: On hemodialysis daily at home  6.  Morbid obesity    Okay from cardiology standpoint to discharge home and return for balloon aortic valvuloplasty on Tuesday.        Sherine Badillo, APRN  01/06/23

## 2023-01-07 NOTE — OUTREACH NOTE
Prep Survey    Flowsheet Row Responses   [facility] facility patient discharged from? [place]   Is LACE score < 7 ? No   Eligibility [facility]   Date of Admission 01/03/23   Date of Discharge 01/06/23   Discharge diagnosis Acute UTI Metastatic breast cancer   ESRD   Does the patient have one of the following disease processes/diagnoses(primary or secondary)? Other   Does the patient have Home health ordered? No   Is there a DME ordered? No   Prep survey completed? Yes          JAYNE ARTIS - Registered Nurse

## 2023-01-08 NOTE — PAYOR COMM NOTE
"Azael Hall (62 y.o. Female)     ATTN: DISCHARGE SUMMARY FOR REVIEW: [id number]     DEPT: [phone],  [phone]    [facility]: NPI [id number]        Date of Birth   1960    Social Security Number       Address   [address] [place] [address]    Home Phone   [phone]    MRN   [id number]       Confucianist   Patient Refused    Marital Status                               Admission Date   1/3/23    Admission Type   Emergency    Admitting Provider   Lorena Harden MD    Attending Provider       Department, Room/Bed   [facility] OBSERVATION, 126/       Discharge Date   2023    Discharge Disposition   Home or Self Care    Discharge Destination                               Attending Provider: (none)   Allergies: No Known Allergies    Isolation: None   Infection: None   Code Status: Prior    Ht: 170.2 cm (67\")   Wt: 168 kg (370 lb 6 oz)    Admission Cmt: None   Principal Problem: Acute UTI (urinary tract infection) [N39.0]                 Active Insurance as of 1/3/2023     Primary Coverage     Payor Plan Insurance Group Employer/Plan Group    [facility] [employer] BLUE [employer] [id number]     Payor Plan Address Payor Plan Phone Number Payor Plan Fax Number Effective Dates    [address] [phone]  2022 - None Entered    [place] [address]       Subscriber Name Subscriber Birth Date Member ID       AZAEL HALL 1960 [id number]                 Emergency Contacts      (Rel.) Home Phone Work Phone Mobile Phone    Rk Hall (Spouse) [phone] -- [phone]             Discharge Summary      Ron Jacob MD at 23 1018              Patient Name: Azael Hall  : 1960  MRN: [id number]    Date of Admission: 1/3/2023  Date of Discharge:  2023  Primary Care Physician: Kiana Gramajo APRN      Chief Complaint:   Weakness - Generalized, Blood in " Urine, and Vomiting      Discharge Diagnoses     Active Hospital Problems    Diagnosis  POA   • **Acute UTI (urinary tract infection) [N39.0]  Unknown   • Diastolic CHF, chronic (HCC) [I50.32]  Unknown   • Adverse effect of chemotherapy [T45.1X5A]  Unknown   • Severe aortic stenosis [I35.0]  Unknown   • Depression [F32.A]  Unknown   • ANDREW (obstructive sleep apnea) [G47.33]  Unknown   • ESRD (end stage renal disease) ([facility]) [N18.6]  Yes   • Morbid obesity with BMI of 50.0-59.9, adult ([facility]) [E66.01, Z68.43]  Not Applicable   • Metastatic breast cancer ([facility]) [C50.919]  Yes   • Normocytic anemia [D64.9]  Yes   • Diabetes type 2, controlled ([facility]) [E11.9]  Yes   • Hyperlipidemia [E78.5]  Yes   • Hypertension [I10]  Yes      Resolved Hospital Problems    Diagnosis Date Resolved POA   • Hypokalemia [E87.6] 01/06/2023 Yes        Hospital Course     Ms. Hall is a 62 y.o. female with a history of ESRD on home HD, metastatic breast cancer, DM2 and severe aortic stenosis who presented to [facility] initially complaining of weakness. She had been unable to stand up afetr completing her HD treatment.  Please see the admitting history and physical for further details.  She was found to have bloody urine and had been having dysuria. She does still urinate a fair amount even with her ESRD status. Her UA was grossly abnormal and she wa sstarted on IV abx. Urine and blood cultures were obtained. The urine grew both Klebsiella and E. Coli but blood cx were negative. She was seen and managed by nephrology regarding her HD. She had been undergoing workup for possible TAVR and cardiology was consulted here. They felt she needed valvulopasty as she was not felt to be acanddiate for TAVR ultiamtely due to her comorbid medical issues. They have recommended she return next week for this and cleared her to go today. She felt stable for discharge and was ready to go this morning.    Day of Discharge     Subjective:  No new  complaint, wants to go home    Physical Exam:  Temp:  [97.9 °F (36.6 °C)-100.5 °F (38.1 °C)] 98.2 °F (36.8 °C)  Heart Rate:  [55-72] 56  Resp:  [16] 16  BP: (102-119)/(47-86) 116/52  Body mass index is 58.01 kg/m².  Physical Exam  Vitals reviewed.   Constitutional:       Appearance: She is obese. She is ill-appearing.   Cardiovascular:      Rate and Rhythm: Normal rate and regular rhythm.   Pulmonary:      Effort: Pulmonary effort is normal. No respiratory distress.      Breath sounds: No wheezing.   Musculoskeletal:      Right lower leg: Edema present.      Left lower leg: Edema present.   Skin:     General: Skin is dry.      Comments: flaky   Neurological:      Mental Status: She is alert and oriented to person, place, and time.         Consultants     Consult Orders (all) (From admission, onward)     Start     Ordered    01/04/23 1121  Inpatient Cardiology Consult  Once        Specialty:  Cardiology  Provider:  Luis Rivers MD    01/04/23 1120    01/04/23 0934  Hematology & Oncology Inpatient Consult  Once        Specialty:  Hematology and Oncology  Provider:  Leodan Irvin Jr., MD    01/04/23 0933    01/04/23 0300  Inpatient Nephrology Consult  Once        Specialty:  Nephrology  Provider:  Tai Antonio MD    01/04/23 0300    01/04/23 0201  [facility] (on-call MD unless specified) Details  Once        Specialty:  Hospitalist  Provider:  (Not yet assigned)    01/04/23 0200              Procedures       Imaging Results (All)     Procedure Component Value Units Date/Time    XR Chest 1 View [[id number]] Collected: 01/04/23 0235     Updated: 01/04/23 0235    Narrative:        Patient: AZAEL ALEXANDER  Time Out: 02:34  Exam(s): FILM CXR 1 VIEW     EXAM:    XR Chest, 1 View    CLINICAL HISTORY:     Reason for exam: Fever, shortness of breath.    TECHNIQUE:    Frontal view of the chest.    COMPARISON:  January 20 at 2022.    FINDINGS:    Lungs: Patchy opacities are present at the left lung base, probably    related to atelectasis, but an early infiltrate is not excluded.    Pleural space:  Unremarkable.  No pneumothorax.    Heart: Cardiomegaly is present, with mild pulmonary vascular congestion.    The possibility of early congestive heart failure is not excluded.    Mediastinum:  Unremarkable.    Bones joints: Degenerative disc disease present.    IMPRESSION:       Cardiomegaly is present with mild pulmonary vascular congestion.    Opacities present at the left lung base, probably related to atelectasis,   but an early infiltrate is not excluded.    Impression:          Electronically signed by Chito Loco D.O. on 01-04-23 at 0234            Pertinent Labs     Results from last 7 days   Lab Units 01/06/23  0553 01/05/23  0602 01/04/23  0650 01/03/23  2317   WBC 10*3/mm3 4.15 3.92 3.84 4.61   HEMOGLOBIN g/dL 9.2* 9.1* 7.0* 7.8*   PLATELETS 10*3/mm3 109* 100* 90* 98*     Results from last 7 days   Lab Units 01/06/23 0553 01/05/23  0602 01/04/23  0650 01/03/23  2317   SODIUM mmol/L 131* 139  131* 135* 134*   POTASSIUM mmol/L 3.6 3.2*  3.1* 2.8* 3.0*   CHLORIDE mmol/L 96* 99  95* 94* 97*   CO2 mmol/L 28.2 25.2  25.1 27.8 29.0   BUN mg/dL 26* 35*  36* 27* 25*   CREATININE mg/dL 5.01* 5.65*  6.33* 5.29* 4.98*   GLUCOSE mg/dL 164* 88  90 194* 201*   EGFR mL/min/1.73 9.1* 7.9*  6.9* 8.5* 9.2*     Results from last 7 days   Lab Units 01/06/23  0553 01/05/23  0602 01/03/23  2317   ALBUMIN g/dL 2.8* 2.3* 2.7*   BILIRUBIN mg/dL  --   --  0.6   ALK PHOS U/L  --   --  80   AST (SGOT) U/L  --   --  12   ALT (SGPT) U/L  --   --  6     Results from last 7 days   Lab Units 01/06/23  0553 01/05/23  0602 01/04/23  0650 01/03/23  2317   CALCIUM mg/dL 7.7* 8.0*  8.2* 7.8* 8.3*   ALBUMIN g/dL 2.8* 2.3*  --  2.7*   MAGNESIUM mg/dL 2.0 1.9  --  2.0   PHOSPHORUS mg/dL 3.0 4.3  --   --                Invalid input(s): LDLCALC  Results from last 7 days   Lab Units 01/04/23  0215 01/04/23  0059 01/04/23  0051   BLOODCX  No growth at  2 days No growth at 2 days  --    URINECX   --   --  >100,000 CFU/mL Escherichia coli*  >100,000 CFU/mL Klebsiella oxytoca*     Results from last 7 days   Lab Units 01/04/23  0050   COVID19  Not Detected       Test Results Pending at Discharge     Pending Labs     Order Current Status    Blood Culture - Blood, Hand, Right Preliminary result    Blood Culture - Blood, Wrist, Right Preliminary result          Discharge Details        Discharge Medications      New Medications      Instructions Start Date   cephalexin 500 MG capsule  Commonly known as: Keflex   500 mg, Oral, 2 Times Daily      midodrine 5 MG tablet  Commonly known as: PROAMATINE   5 mg, Oral, 3 Times Daily Before Meals         Continue These Medications      Instructions Start Date   abemaciclib 150 mg tablet chemo tablet  Commonly known as: VERZENIO   150 mg, Oral, 2 Times Daily      acetaminophen 500 MG tablet  Commonly known as: TYLENOL   500 mg, Oral, As Needed      Armodafinil 250 MG tablet   250 mg, Oral, Daily      aspirin 81 MG EC tablet   81 mg, Oral, Daily      atorvastatin 40 MG tablet  Commonly known as: LIPITOR   TAKE ONE TABLET BY MOUTH ONCE NIGHTLY      folic acid 1 MG tablet  Commonly known as: FOLVITE   TAKE ONE TABLET BY MOUTH DAILY      gabapentin 300 MG capsule  Commonly known as: Neurontin   300 mg, Oral, Nightly      Levemir FlexTouch 100 UNIT/ML injection  Generic drug: insulin detemir   50 Units, Subcutaneous, Daily      levothyroxine 50 MCG tablet  Commonly known as: SYNTHROID, LEVOTHROID   50 mcg, Oral, Daily      mupirocin 2 % ointment  Commonly known as: BACTROBAN   No dose, route, or frequency recorded.      NON FORMULARY   5 mg, Oral, 2 Times Daily, Versinio      NON FORMULARY   1 dose, Subcutaneous, Every 30 Days, facidex      ondansetron 8 MG tablet  Commonly known as: Zofran   8 mg, Oral, Every 8 Hours PRN      sertraline 100 MG tablet  Commonly known as: ZOLOFT   150 mg, Oral, Daily      vitamin B-12 1000 MCG  tablet  Commonly known as: CYANOCOBALAMIN   1,000 mcg, Oral, Daily         Stop These Medications    carvedilol 3.125 MG tablet  Commonly known as: COREG            No Known Allergies    Discharge Disposition:  Home or Self Care      Discharge Diet:  Diet Order   Procedures   • Diet: Diabetic Diets; Consistent Carbohydrate; Texture: Regular Texture (IDDSI 7); Fluid Consistency: Thin (IDDSI 0)       Discharge Activity:       CODE STATUS:    Code Status and Medical Interventions:   Ordered at: 01/04/23 0300     Code Status (Patient has no pulse and is not breathing):    CPR (Attempt to Resuscitate)     Medical Interventions (Patient has pulse or is breathing):    Full Support       Future Appointments   Date Time Provider Department Center   1/19/2023  9:00 AM LAB CHAIR 1 [facility]  [facility]   1/19/2023  9:20 AM Leodan Irvin Jr., MD [facility]   1/19/2023 10:15 AM INJECTION CHAIR [facility]   2/13/2023 12:40 PM Luis Rivers MD [facility]  [facility]      Follow-up Information     Kiana Gramajo APRN .    Specialty: Family Medicine  Contact information:  [address] [place], [place] [id number] [phone]                         Time Spent on Discharge:  Greater than 30 minutes      Ron Jacob MD  [facility]  01/06/23  10:19 EST              Electronically signed by Ron Jacob MD at 01/06/23 7930       Discharge Order (From admission, onward)     Start     Ordered    01/06/23 1012  Discharge patient  Once        Expected Discharge Date: 01/06/23    Discharge Disposition: Home or Self Care    Physician of Record for Attribution - Please select from Treatment Team: STACIA RETANA [[id number]]    Review needed by CMO to determine Physician of Record: No       Question Answer Comment   Physician of Record for Attribution - Please select from Treatment Team STACIA RETANA    Review needed by CMO to  determine Physician of Record No        01/06/23 1016

## 2023-01-09 ENCOUNTER — TRANSITIONAL CARE MANAGEMENT TELEPHONE ENCOUNTER (OUTPATIENT)
Dept: CALL CENTER | Facility: HOSPITAL | Age: 63
End: 2023-01-09
Payer: COMMERCIAL

## 2023-01-09 LAB
BACTERIA SPEC AEROBE CULT: NORMAL
BACTERIA SPEC AEROBE CULT: NORMAL

## 2023-01-09 NOTE — OUTREACH NOTE
Call Center TCM Note    Flowsheet Row Responses   [facility] patient discharged from? [facility]   Does the patient have one of the following disease processes/diagnoses(primary or secondary)? Other   TCM attempt successful? Yes   Call start time 0930   Call end time 0936   Discharge diagnosis Acute UTI Metastatic breast cancer   ESRD   Person spoke with today (if not patient) and relationship Rk spouse   Meds reviewed with patient/caregiver? Yes   Is the patient having any side effects they believe may be caused by any medication additions or changes? No   Does the patient have all medications ordered at discharge? Yes   Is the patient taking all medications as directed (includes completed medication regime)? Yes   Comments Patient going back into hospital for procedure tomorrow (Valvuloplasty), so they will hold off on scheduling a followuup.    Does the patient have an appointment with their PCP within 7 days of discharge? No   Nursing Interventions Patient declined scheduling/rescheduling appointment at this time   Has home health visited the patient within 72 hours of discharge? N/A   Psychosocial issues? No   Did the patient receive a copy of their discharge instructions? Yes   Nursing interventions Reviewed instructions with patient   What is the patient's perception of their health status since discharge? Improving   Is the patient/caregiver able to teach back signs and symptoms related to disease process for when to call PCP? Yes   Is the patient/caregiver able to teach back signs and symptoms related to disease process for when to call 911? Yes   Is the patient/caregiver able to teach back the hierarchy of who to call/visit for symptoms/problems? PCP, Specialist, Home health nurse, Urgent Care, ED, 911 Yes   If the patient is a current smoker, are they able to teach back resources for cessation? Not a smoker   TCM call completed? Yes   Call end time 0936   Would this patient benefit from a  Referral to [facility] Social Work? No   Is the patient interested in additional calls from an ambulatory ?  NOTE:  applies to high risk patients requiring additional follow-up. No          Jignesh Portillo RN    1/9/2023, 09:36 EST

## 2023-01-10 ENCOUNTER — APPOINTMENT (OUTPATIENT)
Dept: CARDIOLOGY | Facility: HOSPITAL | Age: 63
End: 2023-01-10
Payer: COMMERCIAL

## 2023-01-10 ENCOUNTER — HOSPITAL ENCOUNTER (OUTPATIENT)
Facility: HOSPITAL | Age: 63
Discharge: HOME | End: 2023-01-11
Attending: INTERNAL MEDICINE | Admitting: INTERNAL MEDICINE
Payer: COMMERCIAL

## 2023-01-10 DIAGNOSIS — I35.0 SEVERE AORTIC STENOSIS: ICD-10-CM

## 2023-01-10 LAB
BH CV ECHO MEAS - AO MAX PG: 50.7 MMHG
BH CV ECHO MEAS - AO MEAN PG: 29.9 MMHG
BH CV ECHO MEAS - AO V2 MAX: 356.2 CM/SEC
BH CV ECHO MEAS - AO V2 VTI: 95.7 CM
GLUCOSE BLDC GLUCOMTR-MCNC: 120 MG/DL (ref 70–130)
GLUCOSE BLDC GLUCOMTR-MCNC: 139 MG/DL (ref 70–130)
GLUCOSE BLDC GLUCOMTR-MCNC: 86 MG/DL (ref 70–130)
MAXIMAL PREDICTED HEART RATE: 156 BPM
QT INTERVAL: 472 MS
STRESS TARGET HR: 133 BPM

## 2023-01-10 PROCEDURE — 82962 GLUCOSE BLOOD TEST: CPT

## 2023-01-10 PROCEDURE — 93321 DOPPLER ECHO F-UP/LMTD STD: CPT

## 2023-01-10 PROCEDURE — 85347 COAGULATION TIME ACTIVATED: CPT

## 2023-01-10 PROCEDURE — 25010000002 MIDAZOLAM PER 1 MG: Performed by: INTERNAL MEDICINE

## 2023-01-10 PROCEDURE — C1894 INTRO/SHEATH, NON-LASER: HCPCS | Performed by: INTERNAL MEDICINE

## 2023-01-10 PROCEDURE — 93321 DOPPLER ECHO F-UP/LMTD STD: CPT | Performed by: INTERNAL MEDICINE

## 2023-01-10 PROCEDURE — 25010000002 HEPARIN (PORCINE) PER 1000 UNITS: Performed by: INTERNAL MEDICINE

## 2023-01-10 PROCEDURE — C1769 GUIDE WIRE: HCPCS | Performed by: INTERNAL MEDICINE

## 2023-01-10 PROCEDURE — 92986 REVISION OF AORTIC VALVE: CPT | Performed by: INTERNAL MEDICINE

## 2023-01-10 PROCEDURE — 93308 TTE F-UP OR LMTD: CPT | Performed by: INTERNAL MEDICINE

## 2023-01-10 PROCEDURE — 25010000002 PROTAMINE SULFATE PER 10 MG: Performed by: INTERNAL MEDICINE

## 2023-01-10 PROCEDURE — 25010000002 FENTANYL CITRATE (PF) 50 MCG/ML SOLUTION: Performed by: INTERNAL MEDICINE

## 2023-01-10 PROCEDURE — C1760 CLOSURE DEV, VASC: HCPCS | Performed by: INTERNAL MEDICINE

## 2023-01-10 PROCEDURE — 93005 ELECTROCARDIOGRAM TRACING: CPT | Performed by: INTERNAL MEDICINE

## 2023-01-10 PROCEDURE — 99153 MOD SED SAME PHYS/QHP EA: CPT | Performed by: INTERNAL MEDICINE

## 2023-01-10 PROCEDURE — 0 IOPAMIDOL PER 1 ML: Performed by: INTERNAL MEDICINE

## 2023-01-10 PROCEDURE — 93308 TTE F-UP OR LMTD: CPT

## 2023-01-10 PROCEDURE — 93325 DOPPLER ECHO COLOR FLOW MAPG: CPT | Performed by: INTERNAL MEDICINE

## 2023-01-10 PROCEDURE — 93325 DOPPLER ECHO COLOR FLOW MAPG: CPT

## 2023-01-10 PROCEDURE — C1725 CATH, TRANSLUMIN NON-LASER: HCPCS | Performed by: INTERNAL MEDICINE

## 2023-01-10 PROCEDURE — 99152 MOD SED SAME PHYS/QHP 5/>YRS: CPT | Performed by: INTERNAL MEDICINE

## 2023-01-10 RX ORDER — MIDODRINE HYDROCHLORIDE 5 MG/1
5 TABLET ORAL
Status: DISCONTINUED | OUTPATIENT
Start: 2023-01-10 | End: 2023-01-11 | Stop reason: HOSPADM

## 2023-01-10 RX ORDER — ACETAMINOPHEN 325 MG/1
650 TABLET ORAL EVERY 4 HOURS PRN
Status: DISCONTINUED | OUTPATIENT
Start: 2023-01-10 | End: 2023-01-11 | Stop reason: HOSPADM

## 2023-01-10 RX ORDER — SODIUM CHLORIDE 9 MG/ML
75 INJECTION, SOLUTION INTRAVENOUS CONTINUOUS
Status: DISCONTINUED | OUTPATIENT
Start: 2023-01-10 | End: 2023-01-11 | Stop reason: HOSPADM

## 2023-01-10 RX ORDER — CHOLECALCIFEROL (VITAMIN D3) 125 MCG
1000 CAPSULE ORAL DAILY
Status: DISCONTINUED | OUTPATIENT
Start: 2023-01-10 | End: 2023-01-11 | Stop reason: HOSPADM

## 2023-01-10 RX ORDER — TEMAZEPAM 15 MG/1
15 CAPSULE ORAL NIGHTLY PRN
Status: DISCONTINUED | OUTPATIENT
Start: 2023-01-10 | End: 2023-01-11 | Stop reason: HOSPADM

## 2023-01-10 RX ORDER — FOLIC ACID 1 MG/1
1000 TABLET ORAL DAILY
Status: DISCONTINUED | OUTPATIENT
Start: 2023-01-10 | End: 2023-01-11 | Stop reason: HOSPADM

## 2023-01-10 RX ORDER — ONDANSETRON 4 MG/1
4 TABLET, FILM COATED ORAL EVERY 6 HOURS PRN
Status: DISCONTINUED | OUTPATIENT
Start: 2023-01-10 | End: 2023-01-11 | Stop reason: HOSPADM

## 2023-01-10 RX ORDER — PROTAMINE SULFATE 10 MG/ML
INJECTION, SOLUTION INTRAVENOUS
Status: DISCONTINUED | OUTPATIENT
Start: 2023-01-10 | End: 2023-01-10 | Stop reason: HOSPADM

## 2023-01-10 RX ORDER — LIDOCAINE HYDROCHLORIDE 20 MG/ML
INJECTION, SOLUTION INFILTRATION; PERINEURAL
Status: DISCONTINUED | OUTPATIENT
Start: 2023-01-10 | End: 2023-01-10 | Stop reason: HOSPADM

## 2023-01-10 RX ORDER — HEPARIN SODIUM 1000 [USP'U]/ML
INJECTION, SOLUTION INTRAVENOUS; SUBCUTANEOUS
Status: DISCONTINUED | OUTPATIENT
Start: 2023-01-10 | End: 2023-01-10 | Stop reason: HOSPADM

## 2023-01-10 RX ORDER — SODIUM CHLORIDE 0.9 % (FLUSH) 0.9 %
10 SYRINGE (ML) INJECTION EVERY 12 HOURS SCHEDULED
Status: DISCONTINUED | OUTPATIENT
Start: 2023-01-10 | End: 2023-01-10 | Stop reason: HOSPADM

## 2023-01-10 RX ORDER — ATORVASTATIN CALCIUM 20 MG/1
40 TABLET, FILM COATED ORAL NIGHTLY
Status: DISCONTINUED | OUTPATIENT
Start: 2023-01-10 | End: 2023-01-11 | Stop reason: HOSPADM

## 2023-01-10 RX ORDER — LEVOTHYROXINE SODIUM 0.05 MG/1
50 TABLET ORAL DAILY
Status: DISCONTINUED | OUTPATIENT
Start: 2023-01-10 | End: 2023-01-11 | Stop reason: HOSPADM

## 2023-01-10 RX ORDER — HYDROCODONE BITARTRATE AND ACETAMINOPHEN 5; 325 MG/1; MG/1
1 TABLET ORAL EVERY 4 HOURS PRN
Status: DISCONTINUED | OUTPATIENT
Start: 2023-01-10 | End: 2023-01-11 | Stop reason: HOSPADM

## 2023-01-10 RX ORDER — FENTANYL CITRATE 50 UG/ML
INJECTION, SOLUTION INTRAMUSCULAR; INTRAVENOUS
Status: DISCONTINUED | OUTPATIENT
Start: 2023-01-10 | End: 2023-01-10 | Stop reason: HOSPADM

## 2023-01-10 RX ORDER — GABAPENTIN 300 MG/1
300 CAPSULE ORAL NIGHTLY
Status: DISCONTINUED | OUTPATIENT
Start: 2023-01-10 | End: 2023-01-11 | Stop reason: HOSPADM

## 2023-01-10 RX ORDER — ONDANSETRON HYDROCHLORIDE 8 MG/1
8 TABLET, FILM COATED ORAL EVERY 8 HOURS PRN
Status: DISCONTINUED | OUTPATIENT
Start: 2023-01-10 | End: 2023-01-11 | Stop reason: HOSPADM

## 2023-01-10 RX ORDER — MIDAZOLAM HYDROCHLORIDE 1 MG/ML
INJECTION INTRAMUSCULAR; INTRAVENOUS
Status: DISCONTINUED | OUTPATIENT
Start: 2023-01-10 | End: 2023-01-10 | Stop reason: HOSPADM

## 2023-01-10 RX ORDER — ONDANSETRON 2 MG/ML
4 INJECTION INTRAMUSCULAR; INTRAVENOUS EVERY 6 HOURS PRN
Status: DISCONTINUED | OUTPATIENT
Start: 2023-01-10 | End: 2023-01-11 | Stop reason: HOSPADM

## 2023-01-10 RX ORDER — MODAFINIL 100 MG/1
200 TABLET ORAL DAILY
Status: DISCONTINUED | OUTPATIENT
Start: 2023-01-10 | End: 2023-01-11 | Stop reason: HOSPADM

## 2023-01-10 RX ORDER — ASPIRIN 81 MG/1
81 TABLET ORAL DAILY
Status: DISCONTINUED | OUTPATIENT
Start: 2023-01-10 | End: 2023-01-11 | Stop reason: HOSPADM

## 2023-01-10 RX ORDER — SODIUM CHLORIDE 0.9 % (FLUSH) 0.9 %
10 SYRINGE (ML) INJECTION AS NEEDED
Status: DISCONTINUED | OUTPATIENT
Start: 2023-01-10 | End: 2023-01-10 | Stop reason: HOSPADM

## 2023-01-10 RX ORDER — VERAPAMIL HYDROCHLORIDE 2.5 MG/ML
INJECTION, SOLUTION INTRAVENOUS
Status: DISCONTINUED | OUTPATIENT
Start: 2023-01-10 | End: 2023-01-10 | Stop reason: HOSPADM

## 2023-01-10 RX ADMIN — ATORVASTATIN CALCIUM 40 MG: 20 TABLET, FILM COATED ORAL at 20:25

## 2023-01-10 RX ADMIN — MIDODRINE HYDROCHLORIDE 5 MG: 5 TABLET ORAL at 12:57

## 2023-01-10 RX ADMIN — FOLIC ACID 1000 MCG: 1 TABLET ORAL at 12:57

## 2023-01-10 RX ADMIN — LEVOTHYROXINE SODIUM 50 MCG: 0.05 TABLET ORAL at 12:57

## 2023-01-10 RX ADMIN — SERTRALINE 150 MG: 100 TABLET, FILM COATED ORAL at 12:57

## 2023-01-10 RX ADMIN — GABAPENTIN 300 MG: 300 CAPSULE ORAL at 20:25

## 2023-01-10 RX ADMIN — MIDODRINE HYDROCHLORIDE 5 MG: 5 TABLET ORAL at 18:27

## 2023-01-10 RX ADMIN — MODAFINIL 200 MG: 100 TABLET ORAL at 12:57

## 2023-01-10 RX ADMIN — ASPIRIN 81 MG: 81 TABLET, COATED ORAL at 12:57

## 2023-01-10 RX ADMIN — SODIUM CHLORIDE 75 ML/HR: 9 INJECTION, SOLUTION INTRAVENOUS at 08:56

## 2023-01-10 RX ADMIN — Medication 1000 MCG: at 12:57

## 2023-01-10 NOTE — Clinical Note
A 6 fr sheath was  inserted using micropuncture technique with ultrasound guidance into the right femoral vein. Sheath insertion not delayed.

## 2023-01-10 NOTE — Clinical Note
A 6 fr sheath was  inserted using micropuncture technique with ultrasound guidance into the right femoral artery. Sheath insertion not delayed.

## 2023-01-10 NOTE — Clinical Note
Prepped: groin and Right Wrist. Prepped with: ChloraPrep. The patient was draped in a sterile fashion.

## 2023-01-10 NOTE — Clinical Note
Hemostasis started on the right femoral vein. Manual pressure applied to vessel. Manual pressure was held by JEANINE Rivers MD. Manual pressure was held for 5 min. Hemostasis achieved successfully.

## 2023-01-10 NOTE — Clinical Note
Hemostasis started on the right femoral artery. Perclose was used in achieving hemostasis. Closure device deployed in the vessel. Hemostasis achieved successfully.

## 2023-01-10 NOTE — Clinical Note
Hemostasis started on the right radial artery. R-Band was used in achieving hemostasis. Radial compression device applied to vessel. Hemostasis achieved successfully. Closure device additional comment: TR band applied by PIPO Rod Rt(R)  14cc of air in TR band

## 2023-01-10 NOTE — CONSULTS
[facility]                                                                                             Nephrology Initial Consult Note    Patient Identification:  Name: Juana Hall MRN: [id number]  Age: 62 y.o. : 1960  Sex: female  Date:2023    Requesting Physician: As per consult order.  Reason for Consultation: ESRD management  Information from:patient/ family/ chart      History of Present Illness: This is a 62 y.o. year old female with end-stage renal disease, on home hemodialysis 5 days/week.  Has metastatic breast cancer along with aortic valve stenosis.  Due to concerns regarding life expectancy related to her metastatic cancer she was deemed not to be a candidate for a TAVR.  She had aortic valvuloplasty done yesterday.  I was not notified until yesterday afternoon about her admission since another nephrology group was originally consulted.  She feels well today denies any chest pain shortness of breath or edema.  I recommended she receive dialysis here at this facility so she can be monitored since she is immediately postop but she refused her treatment today and stated she would do her dialysis as soon as she got home today.  We could not obtain labs last night, no available extremity to draw blood but labs today show potassium 4.1, sodium 129 and CO2 24.  She denies shortness of breath and does not feel she is carrying a lot of extra fluid at this time.    The following medical history and medications personally reviewed by me:    Problem List:   Patient Active Problem List    Diagnosis    • *Severe aortic stenosis [I35.0]    • Diastolic CHF, chronic (HCC) [I50.32]    • Adverse effect of chemotherapy [T45.1X5A]    • Complicated UTI (urinary tract infection) [N39.0]    • Acute UTI (urinary tract infection) [N39.0]    • Combined systolic and diastolic congestive heart failure (HCC)  [I50.40]    • Depression [F32.A]    • ANDREW (obstructive sleep apnea) [G47.33]    • Bicuspid aortic valve [Q23.1]    • Chronic anemia [D64.9]    • Rectal bleeding [K62.5]    • Generalized weakness [R53.1]    • COVID-19 virus detected [U07.1]    • Hypocalcemia [E83.51]    • Morbid obesity with BMI of 50.0-59.9, adult (HCC) [E66.01, Z68.43]    • ESRD (end stage renal disease) (HCC) [N18.6]    • Pancytopenia due to chemotherapy (HCC) [D61.810]    • Hydronephrosis [N13.30]    • Severe malnutrition (HCC) [E43]    • RYAN (acute kidney injury) (HCC) [N17.9]    • Anemia, chronic disease [D63.8]    • Diastolic CHF, chronic (HCC) [I50.32]    • Metabolic acidosis [E87.20]    • Frequent UTI [N39.0]    • Anxiety and depression [F41.9, F32.A]    • Stage 3b chronic kidney disease (HCC) [N18.32]    • Anemia in stage 3 chronic kidney disease (HCC) [N18.30, D63.1]    • Iron deficiency anemia [D50.9]    • Elevated serum creatinine [R79.89]    • Metastatic breast cancer (HCC) [C50.919]    • Normocytic anemia [D64.9]    • History of right breast cancer [Z85.3]    • Omental mass [K66.8]    • Peripheral neuropathy [G62.9]    • Acquired hypothyroidism [E03.9]    • Anxiety [F41.9]    • Depression [F32.A]    • Diabetes type 2, controlled (HCC) [E11.9]    • Hyperlipidemia [E78.5]    • Heart murmur [R01.1]    • Hypertension [I10]    • Post-traumatic osteoarthritis of multiple joints [M15.3]    • Psoriasis [L40.9]    • Radiculopathy [M54.10]        Past Medical History:  Past Medical History:   Diagnosis Date   • Anemia    • Anxiety and depression    • Bicuspid aortic valve    • Breast cancer (HCC) 2004    RIGHT, HAD NEELA. MASTECTOMY, CHEMO AND RADIATION   • CHF (congestive heart failure) (HCC)    • CKD (chronic kidney disease)    • Diabetes mellitus ([facility])    • Dialysis patient ([facility])     TUES AND SATURDAY REBEKA GUIDO   • Elevated cholesterol    • Frequent UTI    • Heart murmur    • History of kidney stones    • History of MRSA infection 2005    POST  BREAST SURGERY-TREATED [facility]   • History of sepsis 2010    KIDNEY STONE   • History of transfusion    • Hyperlipidemia    • Hypertension    • Hyperthyroidism    • Hypothyroidism    • Kidney stone     CURRENT LEFT-DEC 2021   • Lymphedema of leg     LEFT   • Metastasis from breast cancer (HCC)     STOMACH-OMENTUM   • Neuromuscular disorder (HCC)    • Obesity    • ANDREW on CPAP     CPAP   • Osteoarthrosis    • Peripheral neuropathy     FEET BILAT   • Radiculopathy    • Rectal bleeding 2022   • Thickened endometrium    • Weakness     BILAT LEGS-WITH ANY AMT OF TIME       Past Surgical History:  Past Surgical History:   Procedure Laterality Date   • ARTERIOVENOUS FISTULA/SHUNT SURGERY Left 2021    Procedure: LEFT  BRACHIOCEPALIC ARTERIOVENOUS FISTULA;  Surgeon: Dejuan Adler MD;  Location: [facility] MAIN OR;  Service: Vascular;  Laterality: Left;   • BREAST RECONSTRUCTION      WITH IMPLANTS, AND REVISION, NOW REMOVED   • BREAST SURGERY     • CARDIAC CATHETERIZATION N/A 2022    Procedure: CORONARY ANGIOGRAPHY;  Surgeon: Luis Rivers MD;  Location:  [facility] CATH INVASIVE LOCATION;  Service: Cardiology;  Laterality: N/A;   • CARDIAC CATHETERIZATION N/A 2022    Procedure: Right Heart Cath;  Surgeon: Luis Rivers MD;  Location:  [facility] CATH INVASIVE LOCATION;  Service: Cardiology;  Laterality: N/A;   • CATARACT EXTRACTION WITH INTRAOCULAR LENS IMPLANT Bilateral    •  SECTION  1987   •  SECTION  1987   • CYSTOSCOPY W/ URETERAL STENT PLACEMENT     • CYSTOSCOPY W/ URETERAL STENT PLACEMENT Left 2021    Procedure: CYSTOSCOPY KEFT RETROGRADE PYELOGRAM  LEFT  URETERAL STENT PLACEMENT;  Surgeon: Leodan Mckee Jr., MD;  Location: [facility] MAIN OR;  Service: Urology;  Laterality: Left;   • CYSTOSCOPY W/ URETERAL STENT PLACEMENT Left 03/10/2022    Procedure: CYSTOSCOPY AND LEFT URETERAL STENT EXCHANGE, RETROGRADE PYELOGRAM;  Surgeon:  Leodan Mckee Jr., MD;  Location: [facility] MAIN OR;  Service: Urology;  Laterality: Left;   • D & C HYSTEROSCOPY N/A 05/22/2017    Procedure: DILATATION AND CURETTAGE, HYSTEROSCOPY ;  Surgeon: Cherie Oliveros MD;  Location:  [facility] OR [facility];  Service:    • D & C HYSTEROSCOPY N/A 09/19/2019    Procedure: DILATATION AND CURETTAGE HYSTEROSCOPY/POLYPECTOMY;  Surgeon: Cherie Oliveros MD;  [place] [facility] [place] [facility];  Service: Gynecology   • INSERTION AND REMOVAL HEMODIALYSIS CATHETER Right 05/01/2021   • INSERTION HEMODIALYSIS CATHETER Right 05/01/2021    Procedure: HEMODIALYSIS CATHETER INSERTION;  Surgeon: Clint Hernández MD;  Location: [facility] MAIN OR;  Service: Vascular;  Laterality: Right;   • LYMPH NODE BIOPSY     • MASTECTOMY Bilateral 2004        Home Meds:   No medications prior to admission.       Current Meds:   Current Facility-Administered Medications   Medication Dose Route Frequency Provider Last Rate Last Admin   • acetaminophen (TYLENOL) tablet 650 mg  650 mg Oral Q4H PRN Luis Rivers MD       • aspirin EC tablet 81 mg  81 mg Oral Daily Luis Rivers MD   81 mg at 01/11/23 0930   • atorvastatin (LIPITOR) tablet 40 mg  40 mg Oral Nightly Luis Rivers MD   40 mg at 01/10/23 2025   • folic acid (FOLVITE) tablet 1,000 mcg  1,000 mcg Oral Daily Luis Rivers MD   1,000 mcg at 01/11/23 0931   • gabapentin (NEURONTIN) capsule 300 mg  300 mg Oral Nightly Luis Rivers MD   300 mg at 01/10/23 2025   • HYDROcodone-acetaminophen (NORCO) 5-325 MG per tablet 1 tablet  1 tablet Oral Q4H PRN Luis Rivers MD       • insulin glargine (LANTUS, SEMGLEE) injection 30 Units  30 Units Subcutaneous Nightly Luis Rivers MD       • levothyroxine (SYNTHROID, LEVOTHROID) tablet 50 mcg  50 mcg Oral Daily Luis Rivers MD   50 mcg at 01/11/23 0930   • midodrine (PROAMATINE) tablet 5 mg  5 mg Oral TID AC Luis Rivers MD   5 mg at 01/11/23 0930   •  modafinil (PROVIGIL) tablet 200 mg  200 mg Oral Daily Luis Rivers MD   200 mg at 01/11/23 0930   • ondansetron (ZOFRAN) tablet 4 mg  4 mg Oral Q6H PRN Luis Rivers MD        Or   • ondansetron (ZOFRAN) injection 4 mg  4 mg Intravenous Q6H PRN Luis Rivers MD       • ondansetron (ZOFRAN) tablet 8 mg  8 mg Oral Q8H PRN Luis Rivers MD       • sertraline (ZOLOFT) tablet 150 mg  150 mg Oral Daily Luis Rivers MD   150 mg at 01/11/23 0930   • sodium chloride 0.9 % infusion  75 mL/hr Intravenous Continuous Luis Rivers MD 75 mL/hr at 01/10/23 1130 75 mL/hr at 01/10/23 1130   • temazepam (RESTORIL) capsule 15 mg  15 mg Oral Nightly PRN Luis Rivers MD       • vitamin B-12 (CYANOCOBALAMIN) tablet 1,000 mcg  1,000 mcg Oral Daily Luis Rivers MD   1,000 mcg at 01/11/23 0930     Current Outpatient Medications   Medication Sig Dispense Refill   • abemaciclib (VERZENIO) 150 mg tablet chemo tablet Take 1 tablet by mouth 2 (Two) Times a Day. 60 tablet 5   • acetaminophen (TYLENOL) 500 MG tablet Take 500 mg by mouth As Needed.     • Armodafinil 250 MG tablet Take 1 tablet by mouth Daily. 30 tablet 2   • aspirin 81 MG EC tablet Take 81 mg by mouth Daily.     • atorvastatin (LIPITOR) 40 MG tablet TAKE ONE TABLET BY MOUTH ONCE NIGHTLY 30 tablet 0   • folic acid (FOLVITE) 1 MG tablet TAKE ONE TABLET BY MOUTH DAILY 30 tablet 2   • gabapentin (Neurontin) 300 MG capsule Take 1 capsule by mouth Every Night. 60 capsule 2   • Levemir FlexTouch 100 UNIT/ML injection Inject 50 Units under the skin into the appropriate area as directed Daily. 30 mL 0   • levothyroxine (SYNTHROID, LEVOTHROID) 50 MCG tablet Take 1 tablet by mouth Daily. 90 tablet 1   • midodrine (PROAMATINE) 5 MG tablet Take 1 tablet by mouth 3 (Three) Times a Day Before Meals. 90 tablet 0   • mupirocin (BACTROBAN) 2 % ointment      • ondansetron (Zofran) 8 MG tablet Take 1 tablet by mouth Every 8  (Eight) Hours As Needed for Nausea or Vomiting. 30 tablet 2   • sertraline (ZOLOFT) 100 MG tablet Take 1.5 tablets by mouth Daily. 135 tablet 0   • vitamin B-12 (CYANOCOBALAMIN) 1000 MCG tablet Take 1,000 mcg by mouth Daily.     • cephalexin (Keflex) 500 MG capsule Take 1 capsule by mouth 2 (Two) Times a Day. 4 capsule 0   • NON FORMULARY Inject 1 dose under the skin into the appropriate area as directed Every 30 (Thirty) Days. facidex         Allergies:  No Known Allergies    Social History:   Social History     Socioeconomic History   • Marital status:      Spouse name: Rk   Tobacco Use   • Smoking status: Never   • Smokeless tobacco: Never   Vaping Use   • Vaping Use: Never used   Substance and Sexual Activity   • Alcohol use: No   • Drug use: No   • Sexual activity: Yes     Partners: Male     Birth control/protection: Post-menopausal        Family History:  Family History   Problem Relation Age of Onset   • Heart attack Mother 72   • Coronary artery disease Mother         Mother  from MI at 72   • Diabetes Mother    • Breast cancer Mother    • Hyperlipidemia Mother    • Arthritis Mother    • Cancer Mother    • Heart attack Father 74   • Aortic aneurysm Father         Father with ruptured thoracic aortic aneurysm   • Coronary artery disease Father         Father  from MI at 74   • Heart disease Father    • Hyperlipidemia Father    • Arthritis Father    • Heart attack Maternal Grandmother 76   • Coronary artery disease Maternal Grandmother         MGM deceeased from MI at age 76   • Malig Hyperthermia Neg Hx         Review of Systems: as per HPI, in addition:    General:      Complains of weakness / fatigue,                       No fevers / chills                       no weight loss  HEENT:       no dysphagia / odynophagia  Neck:           normal range of motion, no swelling  Respiratory: no cough / congestion                    Chronic shortness of air                       No  "wheezing  CV:              No chest pain                       No palpitations  Abdomen/GI: no nausea / vomiting                      No diarrhea / constipation                      No abdominal pain  :             no dysuria / urinary frequency                       No urgency, normal output  Endocrine:   no polyuria / polydipsia,                      No heat or cold intolerance  Skin:           no rashes or skin breakdown   Vascular:   No edema                     No claudication  Psych:        no depression/ anxiety  Neuro:        no focal weakness, no seizures  Musculoskeletal: no joint pain or deformities      Physical Exam:  Vitals:   Temp (24hrs), Av.2 °F (36.8 °C), Min:98 °F (36.7 °C), Max:98.4 °F (36.9 °C)    /45   Pulse 65   Temp 98.1 °F (36.7 °C) (Oral)   Resp 16   Ht 170 cm (66.93\")   Wt (!) 165 kg (363 lb 12.1 oz)   LMP  (LMP Unknown)   SpO2 94%   BMI 57.09 kg/m²   Intake/Output:     Intake/Output Summary (Last 24 hours) at 2023 1200  Last data filed at 2023 0800  Gross per 24 hour   Intake 600 ml   Output 300 ml   Net 300 ml        Wt Readings from Last 1 Encounters:   01/10/23 1100 (!) 165 kg (363 lb 12.1 oz)   01/10/23 0821 (!) 165 kg (363 lb 12.1 oz)       Exam:    General Appearance:  Awake, alert, oriented x3, no acute distress  Morbidly obese, well-appearing   Head and Face:  Normocephalic, atraumatic, mucus membranes moist, oropharynx clear   Eyes:  No icterus, pupils equal round and reactive to light, extraocular movements intact    ENMT: Moist mucosa, tongue symmetric    Neck: Supple  no jugular venous distention  no thyromegaly   Pulmonary:  Respiratory effort: Normal  Auscultation of lungs: Clear bilaterally  No wheezes  No rhonchi  Good air movement, good expansion   Chest wall:  No tenderness or deformity   Cardiovascular:  Auscultation of the heart: Normal rhythm, + systolic murmur  1+ edema of bilateral lower extremities   Abdomen:  Abdomen: soft, " non-tender, normal bowel sounds all four quadrants, no masses   Liver and spleen: no hepatosplenomegaly   Musculoskeletal: Digits and nails: normal  Normal range of motion  No joint swelling or gross deformities    Skin: Skin inspection: color normal, no visible rashes or lesions  Skin palpation: texture, turgor normal, no palpable lesions   Lymphatic:  no cervical lymphadenopathy    Psychiatric: Judgement and insight: normal  Orientation to person place and time: normal  Mood and affect: normal       DATA:  Radiology and Labs:  The following labs independently reviewed by me, additional AM labs ordered  Old records independently reviewed showing ESRD on home dialysis  The following radiologic studies independently viewed by me, findings echo showed aortic valve calcification  Interval notes, chart personally reviewed by me.  I have reviewed and summarized old records as detailed above  Plan of care discussed with patient and her  at bedside, also discussed with her RN and with dialysis  New problems include aortic stenosis status post valvuloplasty    Dialysis patient access: Upper extremity aVF    Risk/ complexity of medical care/ medical decision making: Moderate complexity, dialysis management  Chronic illness with severe exacerbation or progression      Labs:   Recent Results (from the past 24 hour(s))   ECG 12 Lead    Collection Time: 01/10/23 12:04 PM   Result Value Ref Range    QT Interval 472 ms   POC Glucose Once    Collection Time: 01/10/23  1:14 PM    Specimen: Blood   Result Value Ref Range    Glucose 86 70 - 130 mg/dL   POC Glucose Once    Collection Time: 01/10/23  8:27 PM    Specimen: Blood   Result Value Ref Range    Glucose 139 (H) 70 - 130 mg/dL   ECG 12 Lead    Collection Time: 01/11/23  4:16 AM   Result Value Ref Range    QT Interval 448 ms   CBC (No Diff)    Collection Time: 01/11/23  9:18 AM    Specimen: Blood   Result Value Ref Range    WBC 5.55 3.40 - 10.80 10*3/mm3    RBC 2.74 (L)  3.77 - 5.28 10*6/mm3    Hemoglobin 8.3 (L) 12.0 - 15.9 g/dL    Hematocrit 26.0 (L) 34.0 - 46.6 %    MCV 94.9 79.0 - 97.0 fL    MCH 30.3 26.6 - 33.0 pg    MCHC 31.9 31.5 - 35.7 g/dL    RDW 16.2 (H) 12.3 - 15.4 %    RDW-SD 56.4 (H) 37.0 - 54.0 fl    MPV 10.4 6.0 - 12.0 fL    Platelets 104 (L) 140 - 450 10*3/mm3   Lipid Panel    Collection Time: 01/11/23  9:18 AM    Specimen: Blood   Result Value Ref Range    Total Cholesterol 81 0 - 200 mg/dL    Triglycerides 99 0 - 150 mg/dL    HDL Cholesterol 26 (L) 40 - 60 mg/dL    LDL Cholesterol  36 0 - 100 mg/dL    VLDL Cholesterol 19 5 - 40 mg/dL    LDL/HDL Ratio 1.35    Renal Function Panel    Collection Time: 01/11/23  9:18 AM    Specimen: Blood   Result Value Ref Range    Glucose 175 (H) 65 - 99 mg/dL    BUN 42 (H) 8 - 23 mg/dL    Creatinine 6.64 (H) 0.57 - 1.00 mg/dL    Sodium 129 (L) 136 - 145 mmol/L    Potassium 4.1 3.5 - 5.2 mmol/L    Chloride 97 (L) 98 - 107 mmol/L    CO2 24.3 22.0 - 29.0 mmol/L    Calcium 8.0 (L) 8.6 - 10.5 mg/dL    Albumin 2.3 (L) 3.5 - 5.2 g/dL    Phosphorus 4.2 2.5 - 4.5 mg/dL    Anion Gap 7.7 5.0 - 15.0 mmol/L    BUN/Creatinine Ratio 6.3 (L) 7.0 - 25.0    eGFR 6.5 (L) >60.0 mL/min/1.73       Radiology:  Imaging Results (Last 24 Hours)     ** No results found for the last 24 hours. **               ASSESSMENT:   ESRD on home HD    Severe aortic stenosis, status post valvuloplasty  Chronic anemia secondary to renal failure and chemotherapy, on high-dose Epogen  Hypertension  Metastatic breast cancer      DISCUSSION/PLAN:   Patient is doing very well status post balloon aortic valvuloplasty  Uneventful night, labs stable  I recommended she do her first hemodialysis postoperatively here in the hospital but she refused and states she will do her treatment as soon as she gets home  Reschedule her monthly lab draw for tomorrow  Discussed with her  and RN at bedside  I will see her next week for monthly follow-up      I appreciate the consult  request, please call if any questions      Tai Antonio M.D  [facility]  Office phone number: [phone]  Answering service phone number: [phone]      1/11/2023        Dictation via Dragon dictation software

## 2023-01-10 NOTE — PROGRESS NOTES
NEPHROLOGY PROGRESS NOTE     Patient with ESRD on home HD , s/p AVF .     Patient follows with Dr. Antonio and Dr. Montes     Discussed with nursing staff to contact at [facility] to continue nephrology care .     Thank you

## 2023-01-11 VITALS
OXYGEN SATURATION: 94 % | HEIGHT: 67 IN | WEIGHT: 293 LBS | DIASTOLIC BLOOD PRESSURE: 45 MMHG | SYSTOLIC BLOOD PRESSURE: 107 MMHG | HEART RATE: 65 BPM | BODY MASS INDEX: 45.99 KG/M2 | TEMPERATURE: 98.1 F | RESPIRATION RATE: 16 BRPM

## 2023-01-11 LAB
ACT BLD: 317 SECONDS (ref 82–152)
ALBUMIN SERPL-MCNC: 2.3 G/DL (ref 3.5–5.2)
ANION GAP SERPL CALCULATED.3IONS-SCNC: 7.7 MMOL/L (ref 5–15)
BUN SERPL-MCNC: 42 MG/DL (ref 8–23)
BUN/CREAT SERPL: 6.3 (ref 7–25)
CALCIUM SPEC-SCNC: 8 MG/DL (ref 8.6–10.5)
CHLORIDE SERPL-SCNC: 97 MMOL/L (ref 98–107)
CHOLEST SERPL-MCNC: 81 MG/DL (ref 0–200)
CO2 SERPL-SCNC: 24.3 MMOL/L (ref 22–29)
CREAT SERPL-MCNC: 6.64 MG/DL (ref 0.57–1)
DEPRECATED RDW RBC AUTO: 56.4 FL (ref 37–54)
EGFRCR SERPLBLD CKD-EPI 2021: 6.5 ML/MIN/1.73
ERYTHROCYTE [DISTWIDTH] IN BLOOD BY AUTOMATED COUNT: 16.2 % (ref 12.3–15.4)
GLUCOSE SERPL-MCNC: 175 MG/DL (ref 65–99)
HCT VFR BLD AUTO: 26 % (ref 34–46.6)
HDLC SERPL-MCNC: 26 MG/DL (ref 40–60)
HGB BLD-MCNC: 8.3 G/DL (ref 12–15.9)
LDLC SERPL CALC-MCNC: 36 MG/DL (ref 0–100)
LDLC/HDLC SERPL: 1.35 {RATIO}
MCH RBC QN AUTO: 30.3 PG (ref 26.6–33)
MCHC RBC AUTO-ENTMCNC: 31.9 G/DL (ref 31.5–35.7)
MCV RBC AUTO: 94.9 FL (ref 79–97)
PHOSPHATE SERPL-MCNC: 4.2 MG/DL (ref 2.5–4.5)
PLATELET # BLD AUTO: 104 10*3/MM3 (ref 140–450)
PMV BLD AUTO: 10.4 FL (ref 6–12)
POTASSIUM SERPL-SCNC: 4.1 MMOL/L (ref 3.5–5.2)
QT INTERVAL: 448 MS
RBC # BLD AUTO: 2.74 10*6/MM3 (ref 3.77–5.28)
SODIUM SERPL-SCNC: 129 MMOL/L (ref 136–145)
TRIGL SERPL-MCNC: 99 MG/DL (ref 0–150)
VLDLC SERPL-MCNC: 19 MG/DL (ref 5–40)
WBC NRBC COR # BLD: 5.55 10*3/MM3 (ref 3.4–10.8)

## 2023-01-11 PROCEDURE — 93010 ELECTROCARDIOGRAM REPORT: CPT | Performed by: INTERNAL MEDICINE

## 2023-01-11 PROCEDURE — 85027 COMPLETE CBC AUTOMATED: CPT | Performed by: INTERNAL MEDICINE

## 2023-01-11 PROCEDURE — 99024 POSTOP FOLLOW-UP VISIT: CPT | Performed by: INTERNAL MEDICINE

## 2023-01-11 PROCEDURE — 80069 RENAL FUNCTION PANEL: CPT | Performed by: INTERNAL MEDICINE

## 2023-01-11 PROCEDURE — 80061 LIPID PANEL: CPT | Performed by: INTERNAL MEDICINE

## 2023-01-11 PROCEDURE — 93005 ELECTROCARDIOGRAM TRACING: CPT | Performed by: INTERNAL MEDICINE

## 2023-01-11 RX ADMIN — FOLIC ACID 1000 MCG: 1 TABLET ORAL at 09:31

## 2023-01-11 RX ADMIN — Medication 1000 MCG: at 09:30

## 2023-01-11 RX ADMIN — SERTRALINE 150 MG: 100 TABLET, FILM COATED ORAL at 09:30

## 2023-01-11 RX ADMIN — MODAFINIL 200 MG: 100 TABLET ORAL at 09:30

## 2023-01-11 RX ADMIN — LEVOTHYROXINE SODIUM 50 MCG: 0.05 TABLET ORAL at 09:30

## 2023-01-11 RX ADMIN — ASPIRIN 81 MG: 81 TABLET, COATED ORAL at 09:30

## 2023-01-11 RX ADMIN — MIDODRINE HYDROCHLORIDE 5 MG: 5 TABLET ORAL at 09:30

## 2023-01-11 NOTE — DISCHARGE SUMMARY
Date of Discharge:  1/11/2023  Date of Admit: 1/10/2023    Discharge Diagnosis  1.  Acute on chronic heart failure with preserved ejection fraction  2.  Severe degenerative aortic valve stenosis  3.  Metastatic breast cancer  4.  End-stage renal disease  5.  Chronic anemia  6.  Morbid obesity    Hospital Course: 62-year-old female with a medical history of severe degenerative aortic valve stenosis, recent admit with acute on chronic heart failure with preserved ejection fraction, end-stage renal disease, metastatic breast cancer, morbid obesity presented for balloon aortic valvuloplasty.  Patient underwent valvuloplasty with a 20 and 22 mm Tyshak balloon to the decrease in the mean gradient from 46 mmHg to 30 mmHg.  Larger balloons were not used secondary to the smaller aortic annulus size measured on prior transcatheter aortic valve replacement CTA.  She did well overnight and is appropriate for discharge home today.    Procedures Performed  Procedure(s):  Valvuloplasty  Aortic root aortogram      Hemodynamics:   Baseline pressures  LV: 153/20  AO: 94/46/62  Mean gradient across aortic valve 46 mmHg.     Post 20 mm Tyshak balloon valvuloplasty  /10  Ao 106/47/68  Mean gradient 36 mmHg    Post 22 mm Tyshak balloon valvuloplasty  /27  801 23/62/82  Mean gradient 30 mmHg    Conclusions:   1.  Successful balloon aortic valvuloplasty with 20 mm and 22 mm Tyshak balloon.  2.  Decrease in mean gradient across aortic valve from 46 mmHg to 30 mmHg.  Residual moderate aortic valve stenosis.      Consults     Date and Time Order Name Status Description    1/10/2023 11:21 AM Inpatient Nephrology Consult Completed     1/4/2023 11:20 AM Inpatient Cardiology Consult Completed     1/4/2023  9:34 AM Hematology & Oncology Inpatient Consult Completed     1/4/2023  3:00 AM Inpatient Nephrology Consult Completed           Pertinent Test Results:  Mean gradient post valvuloplasty on echo 29.9 mmHg.      Discharge Physical  "Exam:  Temp:  [98 °F (36.7 °C)-98.4 °F (36.9 °C)] 98.1 °F (36.7 °C)  Heart Rate:  [59-67] 65  Resp:  [12-20] 16  BP: (102-160)/(47-64) 102/47    Intake/Output Summary (Last 24 hours) at 1/11/2023 0928  Last data filed at 1/11/2023 0800  Gross per 24 hour   Intake 750 ml   Output 300 ml   Net 450 ml     Flowsheet Rows    Flowsheet Row First Filed Value   Admission Height 170.2 cm (67\") Documented at 01/10/2023 0821   Admission Weight 165 kg (363 lb 12.1 oz) Documented at 01/10/2023 0821          General Appearance:    Alert, cooperative, in no acute distress   Head:    Normocephalic, without obvious abnormality, atraumatic       Neck/Lymph   No adenopathy, supple, no thyromegaly, no carotid bruit, no    JVD   Lungs:     Clear to auscultation bilaterally, no wheezes, rales, or     rhonchi    Cardiac:    Normal rate, regular rhythm, 3 out of 6 systolic murmur peak mid , no rub, no gallop   Chest Wall:    No abnormalities observed   GI:     Normal bowel sounds, soft, nontender, nondistended,            no rebound tenderness   Extremities:   No cyanosis, clubbing, or edema   Circulatory/Peripheral Vascular :   Pulses palpable and equal bilaterally   Integumentary:   No bleeding or rash. Normal temperature       Neurologic:   Cranial nerves 2 - 12 grossly intact, sensation intact             Discharge Medications     Discharge Medications      Continue These Medications      Instructions Start Date   abemaciclib 150 mg tablet chemo tablet  Commonly known as: VERZENIO   150 mg, Oral, 2 Times Daily      acetaminophen 500 MG tablet  Commonly known as: TYLENOL   500 mg, Oral, As Needed      Armodafinil 250 MG tablet   250 mg, Oral, Daily      aspirin 81 MG EC tablet   81 mg, Oral, Daily      atorvastatin 40 MG tablet  Commonly known as: LIPITOR   TAKE ONE TABLET BY MOUTH ONCE NIGHTLY      cephalexin 500 MG capsule  Commonly known as: Keflex   500 mg, Oral, 2 Times Daily      folic acid 1 MG tablet  Commonly known as: " FOLVITE   TAKE ONE TABLET BY MOUTH DAILY      gabapentin 300 MG capsule  Commonly known as: Neurontin   300 mg, Oral, Nightly      Levemir FlexTouch 100 UNIT/ML injection  Generic drug: insulin detemir   50 Units, Subcutaneous, Daily      levothyroxine 50 MCG tablet  Commonly known as: SYNTHROID, LEVOTHROID   50 mcg, Oral, Daily      midodrine 5 MG tablet  Commonly known as: PROAMATINE   5 mg, Oral, 3 Times Daily Before Meals      mupirocin 2 % ointment  Commonly known as: BACTROBAN   No dose, route, or frequency recorded.      NON FORMULARY   1 dose, Subcutaneous, Every 30 Days, facidex      ondansetron 8 MG tablet  Commonly known as: Zofran   8 mg, Oral, Every 8 Hours PRN      sertraline 100 MG tablet  Commonly known as: ZOLOFT   150 mg, Oral, Daily      vitamin B-12 1000 MCG tablet  Commonly known as: CYANOCOBALAMIN   1,000 mcg, Oral, Daily             Discharge Diet: Renal diet.    Activity at Discharge: No lifting greater than 10 pounds 1 week.    Discharge disposition: Home    Condition on Discharge: Fair    Follow-up Appointments  Future Appointments   Date Time Provider Department Center   1/19/2023  9:00 AM LAB CHAIR 1 [facility]  [facility]   1/19/2023  9:20 AM Leodan Irvin Jr., MD [facility] CBC [facility] [place]   1/19/2023 10:15 AM INJECTION CHAIR [facility]   2/13/2023 12:40 PM Luis Rivers MD [facility] CD [facility]         Test Results Pending at Discharge  Pending Labs     Order Current Status    CBC (No Diff) Collected (01/11/23 0918)    Lipid Panel Collected (01/11/23 0918)    Renal Function Panel Collected (01/11/23 0918)           Luis Rivers MD  01/11/23  09:27 EST    Greater than 30 min spent in reviewing records, discussion and examination of the patient and discussion with other members of the patient's medical team.     Dictated utilizing Dragon dictation

## 2023-01-11 NOTE — PLAN OF CARE
Goal Outcome Evaluation:           Progress: improving   Post valvuloplasty today via Rt radial site and rt groin. Sites soft. No bruising or hematoma pt denies any numbness or tingling. VSS. Hr 80's  Will conitue to monitor

## 2023-01-11 NOTE — PLAN OF CARE
Goal Outcome Evaluation:  Plan of Care Reviewed With: patient        Progress: no change  Outcome Evaluation: A&O. RA/CPAP at HS. SR. Purewick in place. AV fistula left arm. Right radial/groin side with no new drainage. Dialysis orders called in for today. No complaints of pain.  WCTM

## 2023-01-16 ENCOUNTER — READMISSION MANAGEMENT (OUTPATIENT)
Dept: CALL CENTER | Facility: HOSPITAL | Age: 63
End: 2023-01-16
Payer: COMMERCIAL

## 2023-01-16 NOTE — OUTREACH NOTE
Medical Week 2 Survey    Flowsheet Row Responses   [facility] patient discharged from? [place]   Does the patient have one of the following disease processes/diagnoses(primary or secondary)? Other   Week 2 attempt successful? Yes   Call start time 1745   Discharge diagnosis Severe degenerative aortic valve stenosis,  balloon aortic valvuloplasty   Call end time 1748   Person spoke with today (if not patient) and relationship patient   Meds reviewed with patient/caregiver? Yes  [resumed home meds. Denies any medication issues. ]   Does the patient have all medications ordered at discharge? N/A  [No new meds ordered at discharge. ]   Is the patient taking all medications as directed (includes completed medication regime)? Yes   Does the patient have a primary care provider?  Yes   Comments regarding PCP Hospital Follow Up with BETY [place] Tuesday Jan 17, 2023 1:30 PM   Has the patient kept scheduled appointments due by today? N/A   Has home health visited the patient within 72 hours of discharge? N/A   Psychosocial issues? No   Did the patient receive a copy of their discharge instructions? Yes   What is the patient's perception of their health status since discharge? Improving   Is the patient/caregiver able to teach back the hierarchy of who to call/visit for symptoms/problems? PCP, Specialist, Home health nurse, Urgent Care, ED, 911 Yes   If the patient is a current smoker, are they able to teach back resources for cessation? Not a smoker   Week 2 Call Completed? Yes          JAYNE HOWARD - Registered Nurse

## 2023-01-19 ENCOUNTER — LAB (OUTPATIENT)
Dept: LAB | Facility: HOSPITAL | Age: 63
End: 2023-01-19
Payer: COMMERCIAL

## 2023-01-19 ENCOUNTER — INFUSION (OUTPATIENT)
Dept: ONCOLOGY | Facility: HOSPITAL | Age: 63
End: 2023-01-19
Payer: COMMERCIAL

## 2023-01-19 ENCOUNTER — OFFICE VISIT (OUTPATIENT)
Dept: ONCOLOGY | Facility: CLINIC | Age: 63
End: 2023-01-19
Payer: COMMERCIAL

## 2023-01-19 VITALS
HEART RATE: 68 BPM | HEIGHT: 67 IN | OXYGEN SATURATION: 92 % | BODY MASS INDEX: 45.99 KG/M2 | WEIGHT: 293 LBS | SYSTOLIC BLOOD PRESSURE: 74 MMHG | TEMPERATURE: 98.2 F | RESPIRATION RATE: 16 BRPM | DIASTOLIC BLOOD PRESSURE: 40 MMHG

## 2023-01-19 DIAGNOSIS — C50.919 METASTATIC BREAST CANCER: Primary | ICD-10-CM

## 2023-01-19 DIAGNOSIS — C50.919 METASTATIC BREAST CANCER: ICD-10-CM

## 2023-01-19 LAB
ALBUMIN SERPL-MCNC: 2.5 G/DL (ref 3.5–5.2)
ALBUMIN/GLOB SERPL: 0.5 G/DL (ref 1.1–2.4)
ALP SERPL-CCNC: 89 U/L (ref 38–116)
ALT SERPL W P-5'-P-CCNC: 7 U/L (ref 0–33)
ANION GAP SERPL CALCULATED.3IONS-SCNC: 12.1 MMOL/L (ref 5–15)
AST SERPL-CCNC: 14 U/L (ref 0–32)
BASOPHILS # BLD AUTO: 0.05 10*3/MM3 (ref 0–0.2)
BASOPHILS NFR BLD AUTO: 1.3 % (ref 0–1.5)
BILIRUB SERPL-MCNC: 0.5 MG/DL (ref 0.2–1.2)
BUN SERPL-MCNC: 24 MG/DL (ref 6–20)
BUN/CREAT SERPL: 6 (ref 7.3–30)
CALCIUM SPEC-SCNC: 9 MG/DL (ref 8.5–10.2)
CHLORIDE SERPL-SCNC: 95 MMOL/L (ref 98–107)
CO2 SERPL-SCNC: 26.9 MMOL/L (ref 22–29)
CREAT SERPL-MCNC: 4.02 MG/DL (ref 0.6–1.1)
DEPRECATED RDW RBC AUTO: 59.3 FL (ref 37–54)
EGFRCR SERPLBLD CKD-EPI 2021: 11.9 ML/MIN/1.73
EOSINOPHIL # BLD AUTO: 0.08 10*3/MM3 (ref 0–0.4)
EOSINOPHIL NFR BLD AUTO: 2.2 % (ref 0.3–6.2)
ERYTHROCYTE [DISTWIDTH] IN BLOOD BY AUTOMATED COUNT: 16.2 % (ref 12.3–15.4)
GLOBULIN UR ELPH-MCNC: 4.9 GM/DL (ref 1.8–3.5)
GLUCOSE SERPL-MCNC: 205 MG/DL (ref 74–124)
HCT VFR BLD AUTO: 27.8 % (ref 34–46.6)
HGB BLD-MCNC: 8.6 G/DL (ref 12–15.9)
IMM GRANULOCYTES # BLD AUTO: 0.02 10*3/MM3 (ref 0–0.05)
IMM GRANULOCYTES NFR BLD AUTO: 0.5 % (ref 0–0.5)
LYMPHOCYTES # BLD AUTO: 0.46 10*3/MM3 (ref 0.7–3.1)
LYMPHOCYTES NFR BLD AUTO: 12.4 % (ref 19.6–45.3)
MCH RBC QN AUTO: 30.9 PG (ref 26.6–33)
MCHC RBC AUTO-ENTMCNC: 30.9 G/DL (ref 31.5–35.7)
MCV RBC AUTO: 100 FL (ref 79–97)
MONOCYTES # BLD AUTO: 0.36 10*3/MM3 (ref 0.1–0.9)
MONOCYTES NFR BLD AUTO: 9.7 % (ref 5–12)
NEUTROPHILS NFR BLD AUTO: 2.75 10*3/MM3 (ref 1.7–7)
NEUTROPHILS NFR BLD AUTO: 73.9 % (ref 42.7–76)
NRBC BLD AUTO-RTO: 0 /100 WBC (ref 0–0.2)
PLATELET # BLD AUTO: 140 10*3/MM3 (ref 140–450)
PMV BLD AUTO: 9.3 FL (ref 6–12)
POTASSIUM SERPL-SCNC: 3.4 MMOL/L (ref 3.5–4.7)
PROT SERPL-MCNC: 7.4 G/DL (ref 6.3–8)
RBC # BLD AUTO: 2.78 10*6/MM3 (ref 3.77–5.28)
SODIUM SERPL-SCNC: 134 MMOL/L (ref 134–145)
WBC NRBC COR # BLD: 3.72 10*3/MM3 (ref 3.4–10.8)

## 2023-01-19 PROCEDURE — 96402 CHEMO HORMON ANTINEOPL SQ/IM: CPT

## 2023-01-19 PROCEDURE — 85025 COMPLETE CBC W/AUTO DIFF WBC: CPT

## 2023-01-19 PROCEDURE — 80053 COMPREHEN METABOLIC PANEL: CPT

## 2023-01-19 PROCEDURE — 25010000002 FULVESTRANT PER 25 MG: Performed by: INTERNAL MEDICINE

## 2023-01-19 PROCEDURE — 99215 OFFICE O/P EST HI 40 MIN: CPT | Performed by: INTERNAL MEDICINE

## 2023-01-19 PROCEDURE — 36415 COLL VENOUS BLD VENIPUNCTURE: CPT

## 2023-01-19 RX ORDER — LAMOTRIGINE 25 MG/1
500 TABLET ORAL ONCE
Status: COMPLETED | OUTPATIENT
Start: 2023-01-19 | End: 2023-01-19

## 2023-01-19 RX ORDER — LAMOTRIGINE 25 MG/1
500 TABLET ORAL ONCE
Status: CANCELLED
Start: 2023-01-19 | End: 2023-01-19

## 2023-01-19 RX ADMIN — FULVESTRANT 500 MG: 250 INJECTION, SOLUTION INTRAMUSCULAR at 10:22

## 2023-01-19 NOTE — PROGRESS NOTES
"Chief Complaint  Metastatic lobular breast cancer (ER/IA positive, HER-2/tj negative), anemia secondary to CKD3, CHF, peripheral neuropathy, chemotherapy related nausea chemotherapy-induced myelosuppression    Subjective        History of Present Illness  The patient returns today in follow-up continuing on monthly Faslodex due for cycle 15 today and continuing on abemaciclib 150 mg twice daily.  Patient's visit today is slightly delayed.  She was admitted 1/3 - 1/6/2023 due to Klebsiella and E. coli UTI.  She developed profound weakness with the infection.  She was treated with antibiotics.  Blood cultures were negative.  During her hospitalization, cardiology was asked to see her in regards to proceeding with her valvular procedure for her aortic stenosis, TAVR versus valvuloplasty.  It was ultimately recommended for her to undergo valvuloplasty.  She was admitted again 1/10 - 1/11/2023 and underwent her valvuloplasty at that time.  She returns today in follow-up reporting that she has done very well afterwards.  She is continuing to slowly regain strength after her recent urinary tract infection and the associated profound fatigue.  She reports that she is still having some difficulty standing on her own from a seated position.  Her  reports that they have been working on this actively at home and she does not feel that she needs to pursue any physical therapy at this time.  She remains in a motorized scooter today as she usually does.  She reports that home dialysis is going well.  She is receiving Mircera every 4 weeks and IV iron every 2 weeks from nephrology.      Objective   Vital Signs:   BP (!) 74/40   Pulse 68   Temp 98.2 °F (36.8 °C) (Oral)   Resp 16   Ht 170.2 cm (67.01\")   Wt (!) 164 kg (361 lb 8.9 oz) Comment: states  SpO2 92%   BMI 56.61 kg/m²     Physical Exam  Constitutional:       Appearance: She is well-developed. She is obese.      Comments: Patient is in a motorized scooter today "   Eyes:      Conjunctiva/sclera: Conjunctivae normal.   Neck:      Thyroid: No thyromegaly.   Cardiovascular:      Rate and Rhythm: Normal rate and regular rhythm.      Heart sounds: Murmur heard.     No friction rub. No gallop.      Comments: 2/6 murmur  Pulmonary:      Effort: No respiratory distress.      Breath sounds: Normal breath sounds.   Abdominal:      General: Bowel sounds are normal. There is no distension.      Palpations: Abdomen is soft.      Tenderness: There is no abdominal tenderness.   Musculoskeletal:         General: Swelling present.      Comments: 1+ bilateral lower extremity edema with venous stasis changes noted.  Left upper extremity fistula   Lymphadenopathy:      Head:      Right side of head: No submandibular adenopathy.      Cervical: No cervical adenopathy.      Upper Body:      Right upper body: No supraclavicular adenopathy.      Left upper body: No supraclavicular adenopathy.   Skin:     General: Skin is warm and dry.      Findings: No bruising or rash.   Neurological:      Mental Status: She is alert and oriented to person, place, and time.      Cranial Nerves: No cranial nerve deficit.      Motor: No abnormal muscle tone.      Deep Tendon Reflexes: Reflexes normal.   Psychiatric:         Behavior: Behavior normal.        Patient was examined today, unchanged from above    Result Review : Reviewed CBC, CMP from today.  Reviewed hospital records including discharge summary, progress notes, laboratory studies.  Reviewed PET scan 12/29/2022       Assessment and Plan    *Metastatic lobular breast cancer (ER/MO positive, HER-2/tj negative):  · Previous stage IIIC right breast cancer in 2003, received neoadjuvant chemotherapy (unknown regimen) with subsequent bilateral mastectomies 1/22/2004 with right axillary dissection.  Residual 10-12 cm invasive lobular carcinoma on the right breast with 14/16+ nodes with extranodal extension.  Received adjuvant carboplatin and Navelbine  chemotherapy x4 cycles  · Attempted adjuvant radiation to the chest wall however interrupted due to cellulitis that required removal of implants in August 2004  · Received tamoxifen from 6/22/2004 until June 2009.  Transitioned to Femara and received until June 2014  · Identification of CT findings concerning for carcinomatosis on CT scans and June 2019.  · PET scan confirmed hypermetabolic activity in the omentum as well as small retroperitoneal lymph nodes.  · CT-guided omental biopsy 7/30/2019 with metastatic lobular carcinoma of breast primary, ER positive (greater than 95%), MO positive (90%), HER-2/tj negative (1+ IHC)  · [facility] liquid biopsy 2/5/2020: MSI undetermined, mutations in BETH, NF1, CHEK2.  No evidence of PIK3CA mutation.  · Subsequent [facility] germline testing 11/12/2021 with BETH VUS (c.2864C>A) and POLE VUS (c.631A>T)  · Initiation of Aromasin and Ibrance in August 2019  · Difficulty with myelosuppression related to Ibrance requiring dose alterations, eventually changed to 100 mg for 7 days on followed by 7 days off.  · CT chest abdomen pelvis 10/26/2021 with increase in left hydronephrosis with increase in left perinephric and periureteral stranding. Decrease in stone in the left kidney lower pole (7 mm).  Stable small retroperitoneal lymph and left periaortic lymph nodes.  · PET scan 11/10/2021 with multiple bilateral hypermetabolic nodular pulmonary lesions, asymmetric moderate to severe left hydronephrosis with ill-defined fat stranding and soft tissue thickening in the renal sinus and surrounding the left ureter, hypermetabolic left retroperitoneal lymph node with slight increase in size, ill-defined hypermetabolic thickening in the inferior omentum with increase in size, uptake and C7 (indeterminate, recommended MRI).  Short segments of uptake in the mid and distal esophagus possibly related to gastritis.  · PET scan findings felt to be consistent with progressive malignancy.   Patient declined repeat omental biopsy.   · Options for further treatment discussed on 11/12/2021.  In light of renal failure and dialysis, options are more limited.  Recommendation to continue Ibrance and discontinue Aromasin, begin Faslodex.  Discussed possible future use of single agent Taxol.    · Aromasin discontinued 11/12/2021  · On 11/22/2021 initiation of Faslodex with continuation of Ibrance (100 mg daily for 7 days on followed by 7 days off).  · MRI cervical spine 11/18/2021 showed the right C7 normality to likely represent metastatic disease.  With solitary bone lesion, did not recommend pursuit of bone modifying agent.  · Following 2 cycles Faslodex/Ibrance, PET scan on 1/11/2022 showed no change in the soft tissue superior to the dome of the bladder with hypermetabolic activity (likely related to carcinomatosis).  Significant decrease in injection of fat around the left renal pelvis and stable retroperitoneal hypermetabolic lymph nodes, decrease in size and activity in small bilateral pulmonary nodules.  Findings felt to represent evidence of response to treatment.    · Ibrance held briefly beginning 1/28/2022 due to hospitalization with COVID-19 infection and C. difficile colitis.  Patient developed leukopenia/neutropenia.  Ibrance resumed at previous dosing (100 mg daily 7 days on followed by 7 days off) on 2/9/22.  · Following 5 cycles Faslodex/Ibrance PET scan 4/19/2022 with evidence of mixed response.  New hypermetabolic lesion spinous process L2 (SUV 5.1) and slight increase in activity left clavicular metastasis (SUV increased 4.6-8.1).  C7 hypermetabolic mixed lytic and blastic bone lesion was unchanged.  Decrease in uptake involving omental thickening.  Stable soft tissue thickening around the left renal pelvis and ureter.  Discussion again regarding potential pursuit of IV chemotherapy with Taxol versus continuation of Ibrance and Faslodex with radiation to sites of bony disease at L2, left  clavicle, C7.  Patient opted to defer initiation of systemic chemotherapy and continue Ibrance/Faslodex with consideration of radiation.  · Initiated monthly Xgeva on 5/19/2022 (cleared with nephrology).  Xgeva subsequently held due to significant hypocalcemia.  Decision made to forego further Xgeva with persistent hypocalcemia.  · Palliative radiation received to lumbar spine regarding L2 metastasis 5/23- 6/7/2022  · Ibrance held during radiation therapy beginning 5/22/2022.  Ibrance resumed 6/16/2022.  · PET scan 7/21/2022 with stable hypermetabolic abdominal pelvic lymph nodes and subcarinal lymph node, stable bone lesions at C7, left clavicle.  Stable soft tissue thickening around the left renal pelvis with left hydronephrosis.  Uptake in adrenal glands felt to be likely reactive (no change in size).  No activity noted at L2 (previously radiated).  New hypermetabolic lesion right anterior sixth rib.  · With evidence of further slight progression in bone on PET scan (new right sixth rib lesion), on 7/28/2022 discontinued Ibrance and plan to transition to abemaciclib with continuation of Faslodex.    · On 8/4/2022 initiated abemaciclib at reduced dose of 50 mg twice daily.  · On 8/25/2022, increased abemaciclib dose to 100 mg twice daily  · PET scan 10/6/2022 with stable findings with exception of left acetabular activity report noted new activity however on prior PET scan question of focal activity present previously.  No corresponding CT abnormality and significant increase in SUV at sacral lesion.  Scan otherwise stable.  Decision to continue current treatment  · Abemaciclib dose increased on 10/13/2022 up to 150 mg twice daily  · PET scan 12/29/2022 with artifactual accumulation of tracer right axilla and right mastectomy bed without visualized CT abnormality.  Hypermetabolic bone lesions with decrease in level of activity.  No new findings.  · Patient returns today in follow-up due for cycle 15 Faslodex 500  mg IM every 4 weeks and continuing on abemaciclib 150 mg twice daily.  She did undergo recent PET scan 12/29/2022 which fortunately showed her sites of bone involvement to have decreased in activity level.  There are no new areas of activity.  We therefore plan to continue on with treatment.  She is tolerating this very well with no significant side effects.  Patient has had multiple issues in the interval.  She was admitted 1/3 - 1/6/2023 for Klebsiella and E. coli UTI had significant difficulties with fatigue, reduced mobility during that timeframe.  It was unclear how much of this was related to her aortic stenosis.  She was seen by cardiology and subsequently underwent valvuloplasty on 1/10/2023.  She is doing quite well after that and is getting back to her prior baseline level of functioning.  She continues on home dialysis and is doing well with this.  She has no other complaints today.  We will continue on with treatment as planned.  She will return in 2 weeks for CBC and RN review, 4 weeks for NP visit when she is due for cycle 16 Faslodex.  In 6 weeks she will again have CBC and RN review and I will see her in 8 weeks when she is due for cycle 17 Faslodex.  We discussed pursuit of repeat PET scan at a 3-month interval from the most recent study.    *Anemia secondary to ESRD, underlying malignancy/chronic disease, myelosuppression from CDK 4/6 inhibitor, GI blood loss:  · Anemia secondary to ESRD, malignancy with exacerbation by use of Ibrance  · Previous evidence of folate deficiency 8/12/2019 with level 3.84, initiated folic acid 1 mg daily  · Previous evidence of iron deficiency, received Injectafer on 8/7/2020 750 mg IV x1 dose.  Second dose not administered due to onset of fever, subsequent iron studies precluded further administration of IV iron.  · Previous low normal B12 level initiated oral B12 1000 mcg daily.  · Patient had previously received Procrit and required intermittent transfusion  support  · Following initiation of hemodialysis, management of BEAU transitioned to nephrology, receiving Epogen with dialysis.  · Ongoing transfusion support prompted alteration in Ibrance dosing on 8/23/2021.  · After alteration in Ibrance dosing, hemoglobin improved and remained stable in the 9-10 range.  · Improved but ongoing intermittent need for transfusion support despite Ibrance dose alteration  · Stool negative for occult blood x3 on 11/2/2021  · Patient with reduced dialysis frequency to 2 days/week with concurrent decrease in Epogen dosing corresponding again to increased transfusion requirement.  · Epogen dose increased per nephrology to 20,000 units twice weekly with dialysis on Mondays and Fridays  · Ibrance again exacerbating anemia with ongoing intermittent transfusion requirements.  Ibrance subsequently discontinued on 7/28/2022 and patient initiated abemaciclib on 8/4/2022 which may be less myelosuppressive.  · Additional transfusions required with hemoglobin on 3/31/2022 6.6, hemoglobin 4/21/2022 of 6.4, hemoglobin on 5/5/2022 of 6.8, hemoglobin 6.4 on 7/14/2022, hemoglobin 6.4 on 7/28/2022, hemoglobin 6.8 on 8/18/2022.  · Patient did develop transient visible blood in stool in July 2022  · Anemia evaluation 7/28/2022 with iron 32, ferritin 412, iron saturation 15%, TIBC 210, folate 19.9, B12 925, haptoglobin 300, .  · Stool positive for occult blood x3 on 8/1/2022  · Patient was seen by GI on 8/16/2022, felt to be high risk for endoscopic evaluation and elected to forego EGD/colonoscopy for now  · Patient transitioned from hemodialysis in clinic to home hemodialysis 5 days/week x 2 hours beginning 8/29/2022.  With transition to home dialysis, nephrology transition the patient from Epogen to Mircera administered every 4 weeks in their office, initiated 8/22/2022.  · Patient returns today with hemoglobin of 8.6.  She continues on Mircera every 4 weeks with nephrology and reports that she has  recently started receiving IV iron every 2 weeks as well.  We will continue every 2-week monitoring of her hemoglobin with transfusion support as needed for hemoglobin less than 7 or symptomatic hemoglobin between 7 and 8.    *ESRD on HD:  · Patient with previous CKD3/4  · Hospitalization 4/29-5/4/2021 with RYAN/CKD, UTI, anemia.  Required initiation of hemodialysis Tuesday Thursday Saturday.   · Decrease in frequency of dialysis to twice per week.  She continues to produce a significant amount of urine.   · Status post left upper extremity AV fistula placement on 11/3/2021 by vascular surgery  · Attempt to hold further dialysis on 1/11/2022 with close monitoring of her laboratory and clinical parameters.  Dialysis quickly resumed due to development of hyperkalemia.  · Patient was undergoing dialysis 2 days/week on Mondays and Fridays.    · On 8/22/2022 patient transitioned to use of home dialysis device 5 days/week x 2 hours.    *CHF with severe aortic stenosis:  · Echocardiogram 7/12/2019 showing ejection fraction 48% with moderate dilation of the LV cavity, global hypokinesis, grade 2 diastolic dysfunction, mild to moderate aortic stenosis, trivial pericardial effusion.  · Echocardiogram 7/19/2021 with ejection fraction 56%, left atrial volume moderately increased, grade 2 diastolic dysfunction, severe calcification aortic valve, moderate aortic stenosis, severe mitral calcification, mild mitral stenosis, marked elevation in RVSP from tricuspid regurgitation.  · Echocardiogram on 7/13/2022 with ejection fraction 56-60%, grade 2 diastolic dysfunction, severe aortic stenosis.    · Cardiac catheterization 8/23/2022 showed normal coronary arteries, mild to moderate pulmonary hypertension.    · She was evaluated by cardiothoracic surgery Dr. Pagan and there was discussion regarding potential candidacy for TAVR although there was concern given her underlying comorbidities including her metastatic breast cancer.  I  discussed patient's prognosis with Dr. Pagan.  She does have opportunities for additional treatment of her malignancy with intravenous chemotherapy and is willing to pursue this in the future when needed.  It is unclear as to her expected survival however given her multiple severe comorbidities with metastatic breast cancer, ESRD on dialysis, severe aortic stenosis, severe anemia.  There is consideration of possible pursuit of balloon valvuloplasty initially to assess her symptomatic response and then consider pursuit of TAVR in the future.   · Patient did undergo aortic valvuloplasty during admission 1/10 - 1/11/2023.    *Left upper and bilateral lower extremity peripheral neuropathy:  · Patient reports that left upper extremity neuropathy was not present from previous chemotherapy but occurred after hospitalization that required intubation and critical care stay.  Apparently felt to represent critical care neuropathy.  Improved over time.  · Bilateral lower extremity neuropathy felt to be related to diabetes  · May have future implications regarding treatment for breast cancer.  · Patient reports that peripheral neuropathy symptoms continue in the bilateral lower extremities, unchanged.  This will be a consideration with potential future use of Taxol    *Uterine/endometrial activity on PET scan:  · Patient experienced postmenopausal vaginal bleeding in 2013, negative endometrial biopsy and gynecologic evaluation.  · With findings on PET scan with enlarging uterus and some hypermetabolic activity, referred back to Dr. Oliveros in gynecology.    · 9/19/2019 D&C with hysteroscopy by Dr. Oliveros, no significant intraoperative findings, pathologic results with benign findings, no evidence of malignancy.    *Nausea:  · Mild, relieved with Zofran.   · Patient experienced improvement in nausea after initiating hemodialysis  · No recent nausea    *Myelosuppression secondary to CDK 4/6 inhibitor:  · Patient was able to maintain  adequate WBC after dosing alteration on Ibrance to treatment at 100 mg daily for 7 days on followed by 7 days off.  · Subsequent transition from Ibrance to abemaciclib  · Today, WBC 3.72    *Depression  · Patient with history of depression, worsened after the death of her son in November 2021  · With evidence of progressive malignancy in November 2021, patient agreeable to referral to supportive oncology  · Patient currently receiving gabapentin 300 mg nightly, Zoloft 150 mg daily, armodafinil 250 mg daily  · Patient does continue with difficulties regarding depression that wax and wane.  Currently symptoms under fair control.  Patient last seen by supportive oncology on 9/26/2022.    *Left perinephric stranding secondary to malignancy with hydronephrosis:  · CT 4/22/2021 showed interval enlargement of 2 staghorn calculi in the left kidney with persistent left perinephric stranding  · Hospitalization 4/29-5/4/2021 with RYAN/CKD, UTI, anemia.  Required 2 unit PRBC transfusion and initiated hemodialysis Tuesday Thursday Saturday.    · Discussion from urology regarding potential need for surgical procedure to address staghorn calculus left kidney  · CT scan 7/2/2021 with only 1 remaining left renal stone identified which had decreased in size.  Patient appears to have passed the other stone.  · As above, CT 10/26/2021 with more prominent left hydronephrosis and increase in left perinephric and periureteral stranding.  Felt to possibly be related to passage of recent stone versus partial obstruction secondary to debris or thrombus in the left ureter versus pyelonephritis.  Patient however with no clinical evidence of recent stone passage nor pyelonephritis. Malignancy felt to be the cause of CT changes around the left kidney at this point.   · Left ureteral stent replacement 12/16/2021  · PET scan 1/11/2022 with decrease in fat injection near left renal pelvis, stent in satisfactory position.  Mild residual  hydronephrosis on the left.  · Ureteral stent replaced on 3/10/2022  · PET scan 4/19/2022 with no hydronephrosis, left ureteral stent in place  · PET scan 7/21/2022 with persistent left hydronephrosis, ureteral stent in place  · PET scan 10/6/2022 with left nephroureteral stent in place, overall stable findings  · PET scan 12/29/2022 with left nephroureteral stent remaining in place, stable findings    *Right thyroid nodules  · Patient underwent prior thyroid biopsy by her report with benign findings many years ago, records not available  · On MRI cervical spine 11/18/2021, finding of 1.8 cm right thyroid nodule  · Thyroid ultrasound 11/26/2021 with right-sided thyroid nodules, 1 nodule was hypoechoic, solid measuring 2 cm with biopsy recommended.  · Thyroid ultrasound results reviewed with patient.  In light of her metastatic breast cancer, renal failure the significance of the thyroid nodule is felt to be much less.  We discussed possibility of thyroid biopsy however patient is inclined to forego this, especially since she has had a biopsy performed in the past with benign findings by her report.    · No hypermetabolic activity identified on PET scan 1/11/2022 in the neck    *Hospitalization 1/28-1/30/2022 due to COVID-19 infection and C. difficile colitis  · Patient fully vaccinated with 3 doses Moderna COVID-19 vaccine  · Patient developed symptoms 1/26/2022 with abrupt onset sinus congestion, mild cough, subsequently developed nausea and diarrhea on 1/27/2022.  Significant fatigue.  · Patient tested positive in emergency department on 1/28/2022 and was admitted  · Patient maintained O2 saturation in mid 90% range on room air  · Patient received remdesivir  · Patient was also found to be positive for C. difficile colitis, received course of oral vancomycin.  · Symptoms from both COVID-19 and C. difficile colitis ultimately resolved.    *Hypocalcemia  · Patient developed significant hypocalcemia after receiving  Xgeva on 5/19/2022  · Calcium management per nephrology  · No further Xgeva planned due to persistent significant hypocalcemia  · Calcium today 9.0.    Plan:  1. Patient will proceed with Faslodex 500 mg IM injection today and continue every 28 days (today is cycle 15)  2. Continue [id number] mg twice daily  3. Continue oral folic acid 1 mg daily, B12 1000 mcg daily.  4. The patient is continuing with home dialysis device 5 days/week x 2 hours.  5. Patient is receiving Mircera under the direction of nephrology, dosing every 4 weeks (last received on 1/17/2023) as well as IV iron every 2 weeks.  6. Continue to monitor hemoglobin closely regarding ongoing transfusion needs for hemoglobin less than 7.0.  7. In 2 weeks CBC and RN review  8. In 4 weeks nurse practitioner visit with CBC, CMP and Faslodex cycle 16  9. In 6 weeks CBC and RN review  10. In 8 weeks MD visit with CBC, CMP and Faslodex cycle 17.  We will plan 3-month interval PET scan.    Patient continues on high risk medication requiring intensive monitoring.    I did spend 40 minutes in total time caring for patient today, time spent in review of records, face-to-face consultation, coordination of care, placement of orders, completion of documentation.

## 2023-01-20 ENCOUNTER — OFFICE VISIT (OUTPATIENT)
Dept: FAMILY MEDICINE CLINIC | Facility: CLINIC | Age: 63
End: 2023-01-20
Payer: COMMERCIAL

## 2023-01-20 VITALS
BODY MASS INDEX: 45.99 KG/M2 | WEIGHT: 293 LBS | HEART RATE: 66 BPM | RESPIRATION RATE: 16 BRPM | SYSTOLIC BLOOD PRESSURE: 110 MMHG | HEIGHT: 67 IN | TEMPERATURE: 97.5 F | OXYGEN SATURATION: 99 % | DIASTOLIC BLOOD PRESSURE: 60 MMHG

## 2023-01-20 DIAGNOSIS — I50.32 DIASTOLIC CHF, CHRONIC: ICD-10-CM

## 2023-01-20 DIAGNOSIS — N39.0 COMPLICATED UTI (URINARY TRACT INFECTION): ICD-10-CM

## 2023-01-20 DIAGNOSIS — Z09 HOSPITAL DISCHARGE FOLLOW-UP: ICD-10-CM

## 2023-01-20 DIAGNOSIS — N18.6 ESRD (END STAGE RENAL DISEASE): Primary | ICD-10-CM

## 2023-01-20 DIAGNOSIS — R01.1 HEART MURMUR: ICD-10-CM

## 2023-01-20 PROCEDURE — 99214 OFFICE O/P EST MOD 30 MIN: CPT | Performed by: NURSE PRACTITIONER

## 2023-01-20 RX ORDER — CEPHALEXIN 500 MG/1
500 CAPSULE ORAL 2 TIMES DAILY
Qty: 8 CAPSULE | Refills: 0 | Status: SHIPPED | OUTPATIENT
Start: 2023-01-20 | End: 2023-01-27 | Stop reason: SDUPTHER

## 2023-01-20 NOTE — PROGRESS NOTES
Subjective   Juana Hall is a 62 y.o. female.     History of Present Illness   Juana Hall 62 y.o. female presents today for hosptial follow up.  she was treated [facility] for balloon aortic valvuloplasty.  I reviewed all of the labs and diagnostic testing.    Current outpatient and discharge medications have been reconciled for the patient.  Reviewed by: BETY Washington    she does have a follow up appointment with a specialist:     Hospital note as follows:    Date of Discharge:  1/11/2023  Date of Admit: 1/10/2023     Discharge Diagnosis  1.  Acute on chronic heart failure with preserved ejection fraction  2.  Severe degenerative aortic valve stenosis  3.  Metastatic breast cancer  4.  End-stage renal disease  5.  Chronic anemia  6.  Morbid obesity     Hospital Course: 62-year-old female with a medical history of severe degenerative aortic valve stenosis, recent admit with acute on chronic heart failure with preserved ejection fraction, end-stage renal disease, metastatic breast cancer, morbid obesity presented for balloon aortic valvuloplasty.  Patient underwent valvuloplasty with a 20 and 22 mm Tyshak balloon to the decrease in the mean gradient from 46 mmHg to 30 mmHg.  Larger balloons were not used secondary to the smaller aortic annulus size measured on prior transcatheter aortic valve replacement CTA.  She did well overnight and is appropriate for discharge home today.       BP is good today at 110/60.  She reports feeling improved but has started having some dysuria again.  She needs urinalysis rechecked.  She has f/u appt with hematology and cardiology scheduled.  She has labs upcoming with hematology and gets labs regularly due to dialysis.   The following portions of the patient's history were reviewed and updated as appropriate: allergies, current medications, past family history, past medical history, past social history, past surgical history and problem list.    Review of Systems    Constitutional: Positive for fatigue.   Respiratory: Positive for shortness of breath.    Genitourinary: Positive for decreased urine volume and dysuria.   Psychiatric/Behavioral: Negative for confusion.       Objective   Physical Exam  Vitals and nursing note reviewed.   Constitutional:       Appearance: She is well-developed.   Cardiovascular:      Rate and Rhythm: Normal rate and regular rhythm.      Heart sounds: Murmur heard.   Pulmonary:      Effort: Pulmonary effort is normal.      Breath sounds: Normal breath sounds.   Neurological:      Mental Status: She is alert and oriented to person, place, and time.   Psychiatric:         Mood and Affect: Mood normal.         Behavior: Behavior normal.         Thought Content: Thought content normal.         Judgment: Judgment normal.         Assessment & Plan   Diagnoses and all orders for this visit:    1. ESRD (end stage renal disease) ([facility]) (Primary)    2. Complicated UTI (urinary tract infection)  -     cephalexin (Keflex) 500 MG capsule; Take 1 capsule by mouth 2 (Two) Times a Day.  Dispense: 8 capsule; Refill: 0    3. Diastolic CHF, chronic ([facility])    4. Heart murmur    5. Hospital discharge follow-up      Hospital records reviewed with pt confirming HPI.Current outpatient and discharge medications have been reconciled for the patient.  Reviewed by: BETY Washington      Refilled cephalexin x 4 days and sent home urine sample cup and specimen hat.  will return sample Monday.  Patient will do dialysis early and take morning dose of cephalexin after.

## 2023-01-23 ENCOUNTER — SPECIALTY PHARMACY (OUTPATIENT)
Dept: PHARMACY | Facility: HOSPITAL | Age: 63
End: 2023-01-23
Payer: COMMERCIAL

## 2023-01-23 ENCOUNTER — TELEPHONE (OUTPATIENT)
Dept: FAMILY MEDICINE CLINIC | Facility: CLINIC | Age: 63
End: 2023-01-23
Payer: COMMERCIAL

## 2023-01-23 DIAGNOSIS — R39.9 URINARY SYMPTOM OR SIGN: Primary | ICD-10-CM

## 2023-01-23 LAB
BILIRUB BLD-MCNC: NEGATIVE MG/DL
CLARITY, POC: ABNORMAL
COLOR UR: ABNORMAL
EXPIRATION DATE: ABNORMAL
GLUCOSE UR STRIP-MCNC: NEGATIVE MG/DL
KETONES UR QL: ABNORMAL
LEUKOCYTE EST, POC: ABNORMAL
Lab: ABNORMAL
NITRITE UR-MCNC: NEGATIVE MG/ML
PH UR: 6 [PH] (ref 5–8)
PROT UR STRIP-MCNC: ABNORMAL MG/DL
RBC # UR STRIP: ABNORMAL /UL
SP GR UR: 1.01 (ref 1–1.03)
UROBILINOGEN UR QL: ABNORMAL

## 2023-01-23 PROCEDURE — 81003 URINALYSIS AUTO W/O SCOPE: CPT | Performed by: NURSE PRACTITIONER

## 2023-01-23 NOTE — PROGRESS NOTES
MTM Encounter    Verzenio 150 mg by mouth BID    Called patient for routine check-in, no answer. Left voicemail to return my call at direct line [phone].     Hailey Miller, Pharmacy Intern  1/23/23

## 2023-01-24 ENCOUNTER — READMISSION MANAGEMENT (OUTPATIENT)
Dept: CALL CENTER | Facility: HOSPITAL | Age: 63
End: 2023-01-24
Payer: COMMERCIAL

## 2023-01-24 NOTE — OUTREACH NOTE
Medical Week 3 Survey    Flowsheet Row Responses   [facility] patient discharged from? [place]   Does the patient have one of the following disease processes/diagnoses(primary or secondary)? Other   Week 3 attempt successful? Yes   Call start time 1505   Call end time 1507   Meds reviewed with patient/caregiver? Yes   Is the patient having any side effects they believe may be caused by any medication additions or changes? No   Does the patient have all medications ordered at discharge? N/A   Is the patient taking all medications as directed (includes completed medication regime)? Yes   Does the patient have a primary care provider?  Yes   Has the patient kept scheduled appointments due by today? Yes   Has home health visited the patient within 72 hours of discharge? N/A   Psychosocial issues? No   What is the patient's perception of their health status since discharge? Improving   Is the patient/caregiver able to teach back signs and symptoms related to disease process for when to call PCP? Yes   Is the patient/caregiver able to teach back signs and symptoms related to disease process for when to call 911? Yes   Is the patient/caregiver able to teach back the hierarchy of who to call/visit for symptoms/problems? PCP, Specialist, Home health nurse, Urgent Care, ED, 911 Yes   If the patient is a current smoker, are they able to teach back resources for cessation? Not a smoker   Week 3 Call Completed? Yes   Graduated Yes   Is the patient interested in additional calls from an ambulatory ?  NOTE:  applies to high risk patients requiring additional follow-up. No   Wrap up additional comments Patient states is doing well. Denies any needs/concerns.          SHERMAN COX - Registered Nurse

## 2023-01-25 ENCOUNTER — SPECIALTY PHARMACY (OUTPATIENT)
Dept: PHARMACY | Facility: HOSPITAL | Age: 63
End: 2023-01-25
Payer: COMMERCIAL

## 2023-01-25 DIAGNOSIS — Z79.4 CONTROLLED TYPE 2 DIABETES MELLITUS WITHOUT COMPLICATION, WITH LONG-TERM CURRENT USE OF INSULIN: ICD-10-CM

## 2023-01-25 DIAGNOSIS — E11.9 CONTROLLED TYPE 2 DIABETES MELLITUS WITHOUT COMPLICATION, WITH LONG-TERM CURRENT USE OF INSULIN: ICD-10-CM

## 2023-01-25 RX ORDER — INSULIN DETEMIR 100 [IU]/ML
50 INJECTION, SOLUTION SUBCUTANEOUS DAILY
Qty: 30 ML | Refills: 0 | OUTPATIENT
Start: 2023-01-25

## 2023-01-25 NOTE — PROGRESS NOTES
MTM Encounter-Re: Adherence and side effects (Verzenio)    Today's encounter was conducted via telephone.    Medication:  Verzenio 150 mg by mouth twice daily  - Reason for outreach: Routine medication check-in .  - Administration: twice daily and as prescribed .  - Missed doses: Patient reports missing 5 days in the last 30 days due to hospital admission  - Self-administration: Patient demonstrates ability to self-administer medication. No barriers to adherence identified.   - Diagnosis/Indication: Breast cancer. Progress toward achieving therapeutic goals reviewed.   - Patient denies side effects.    - Medication availability/affordability: Patient has had no issues obtaining medication from pharmacy.   - Questions/concerns about medications: none       Completed medication reconciliation today to assess for drug interactions.   Reviewed allergies, medical history, labs, quality of life, and medication history with patient.   Patient denies starting or stopping any medications.  Assessed medication list for interactions, no significant drug interactions noted.   Advised pt to call the clinic if any new medications are started so we can assess for drug-drug interactions.     All questions addressed. Patient had no additional concerns for MTM office.     Hailey Miller, Pharmacy Intern  1/25/2023  09:14 EST

## 2023-01-27 ENCOUNTER — TELEPHONE (OUTPATIENT)
Dept: FAMILY MEDICINE CLINIC | Facility: CLINIC | Age: 63
End: 2023-01-27
Payer: COMMERCIAL

## 2023-01-27 DIAGNOSIS — N39.0 COMPLICATED UTI (URINARY TRACT INFECTION): ICD-10-CM

## 2023-01-27 RX ORDER — CEPHALEXIN 500 MG/1
500 CAPSULE ORAL 2 TIMES DAILY
Qty: 20 CAPSULE | Refills: 0 | Status: SHIPPED | OUTPATIENT
Start: 2023-01-27 | End: 2023-02-13

## 2023-01-27 NOTE — TELEPHONE ENCOUNTER
S/w patient, the keflex she was given was only for 4 days and her symptoms were better until she finished them.  Patient states that her symptoms have returned and she is having a lot of discomfort.  I advised that I would check for he culture results and let Kiana know what was going on and would get back to her before the end of day.

## 2023-01-27 NOTE — TELEPHONE ENCOUNTER
S/w patient, advised that lab was still pending but Kiana sent in an additional 10 days of medication.  We will call her when we get culture back.

## 2023-01-28 LAB
BACTERIA UR CULT: ABNORMAL
BACTERIA UR CULT: ABNORMAL
Lab: NORMAL
OTHER ANTIBIOTIC SUSC ISLT: ABNORMAL

## 2023-01-30 ENCOUNTER — TELEPHONE (OUTPATIENT)
Dept: PSYCHIATRY | Facility: HOSPITAL | Age: 63
End: 2023-01-30
Payer: COMMERCIAL

## 2023-01-30 ENCOUNTER — HOSPITAL ENCOUNTER (OUTPATIENT)
Facility: HOSPITAL | Age: 63
Setting detail: OBSERVATION
Discharge: HOME | End: 2023-01-31
Attending: HOSPITALIST | Admitting: HOSPITALIST
Payer: COMMERCIAL

## 2023-01-30 DIAGNOSIS — N39.0 COMPLICATED UTI (URINARY TRACT INFECTION): Primary | ICD-10-CM

## 2023-01-30 LAB
ALBUMIN SERPL-MCNC: 2.8 G/DL (ref 3.5–5.2)
ALBUMIN/GLOB SERPL: 0.6 G/DL
ALP SERPL-CCNC: 77 U/L (ref 39–117)
ALT SERPL W P-5'-P-CCNC: 10 U/L (ref 1–33)
ANION GAP SERPL CALCULATED.3IONS-SCNC: 7.4 MMOL/L (ref 5–15)
AST SERPL-CCNC: 13 U/L (ref 1–32)
BACTERIA UR QL AUTO: ABNORMAL /HPF
BASOPHILS # BLD AUTO: 0.06 10*3/MM3 (ref 0–0.2)
BASOPHILS NFR BLD AUTO: 2 % (ref 0–1.5)
BILIRUB SERPL-MCNC: 0.4 MG/DL (ref 0–1.2)
BILIRUB UR QL STRIP: NEGATIVE
BUN SERPL-MCNC: 21 MG/DL (ref 8–23)
BUN/CREAT SERPL: 5 (ref 7–25)
CALCIUM SPEC-SCNC: 9.7 MG/DL (ref 8.6–10.5)
CHLORIDE SERPL-SCNC: 98 MMOL/L (ref 98–107)
CLARITY UR: ABNORMAL
CO2 SERPL-SCNC: 29.6 MMOL/L (ref 22–29)
COLOR UR: YELLOW
CREAT SERPL-MCNC: 4.18 MG/DL (ref 0.57–1)
DEPRECATED RDW RBC AUTO: 61.4 FL (ref 37–54)
EGFRCR SERPLBLD CKD-EPI 2021: 11.3 ML/MIN/1.73
EOSINOPHIL # BLD AUTO: 0.15 10*3/MM3 (ref 0–0.4)
EOSINOPHIL NFR BLD AUTO: 5 % (ref 0.3–6.2)
ERYTHROCYTE [DISTWIDTH] IN BLOOD BY AUTOMATED COUNT: 17.3 % (ref 12.3–15.4)
GLOBULIN UR ELPH-MCNC: 4.9 GM/DL
GLUCOSE BLDC GLUCOMTR-MCNC: 123 MG/DL (ref 70–130)
GLUCOSE SERPL-MCNC: 160 MG/DL (ref 65–99)
GLUCOSE UR STRIP-MCNC: NEGATIVE MG/DL
HCT VFR BLD AUTO: 25.6 % (ref 34–46.6)
HGB BLD-MCNC: 8.2 G/DL (ref 12–15.9)
HGB UR QL STRIP.AUTO: ABNORMAL
HYALINE CASTS UR QL AUTO: ABNORMAL /LPF
IMM GRANULOCYTES # BLD AUTO: 0.01 10*3/MM3 (ref 0–0.05)
IMM GRANULOCYTES NFR BLD AUTO: 0.3 % (ref 0–0.5)
KETONES UR QL STRIP: ABNORMAL
LEUKOCYTE ESTERASE UR QL STRIP.AUTO: ABNORMAL
LYMPHOCYTES # BLD AUTO: 0.61 10*3/MM3 (ref 0.7–3.1)
LYMPHOCYTES NFR BLD AUTO: 20.2 % (ref 19.6–45.3)
MCH RBC QN AUTO: 31.3 PG (ref 26.6–33)
MCHC RBC AUTO-ENTMCNC: 32 G/DL (ref 31.5–35.7)
MCV RBC AUTO: 97.7 FL (ref 79–97)
MONOCYTES # BLD AUTO: 0.29 10*3/MM3 (ref 0.1–0.9)
MONOCYTES NFR BLD AUTO: 9.6 % (ref 5–12)
NEUTROPHILS NFR BLD AUTO: 1.9 10*3/MM3 (ref 1.7–7)
NEUTROPHILS NFR BLD AUTO: 62.9 % (ref 42.7–76)
NITRITE UR QL STRIP: NEGATIVE
NRBC BLD AUTO-RTO: 0 /100 WBC (ref 0–0.2)
PH UR STRIP.AUTO: 6.5 [PH] (ref 5–8)
PLATELET # BLD AUTO: 89 10*3/MM3 (ref 140–450)
PMV BLD AUTO: 9.7 FL (ref 6–12)
POTASSIUM SERPL-SCNC: 3.2 MMOL/L (ref 3.5–5.2)
PROT SERPL-MCNC: 7.7 G/DL (ref 6–8.5)
PROT UR QL STRIP: ABNORMAL
RBC # BLD AUTO: 2.62 10*6/MM3 (ref 3.77–5.28)
RBC # UR STRIP: ABNORMAL /HPF
REF LAB TEST METHOD: ABNORMAL
SODIUM SERPL-SCNC: 135 MMOL/L (ref 136–145)
SP GR UR STRIP: 1.01 (ref 1–1.03)
SQUAMOUS #/AREA URNS HPF: ABNORMAL /HPF
UROBILINOGEN UR QL STRIP: ABNORMAL
WBC # UR STRIP: ABNORMAL /HPF
WBC NRBC COR # BLD: 3.02 10*3/MM3 (ref 3.4–10.8)

## 2023-01-30 PROCEDURE — 87186 SC STD MICRODIL/AGAR DIL: CPT | Performed by: INTERNAL MEDICINE

## 2023-01-30 PROCEDURE — 87077 CULTURE AEROBIC IDENTIFY: CPT | Performed by: INTERNAL MEDICINE

## 2023-01-30 PROCEDURE — 87086 URINE CULTURE/COLONY COUNT: CPT | Performed by: INTERNAL MEDICINE

## 2023-01-30 PROCEDURE — 81001 URINALYSIS AUTO W/SCOPE: CPT | Performed by: INTERNAL MEDICINE

## 2023-01-30 PROCEDURE — 82962 GLUCOSE BLOOD TEST: CPT

## 2023-01-30 PROCEDURE — 80053 COMPREHEN METABOLIC PANEL: CPT | Performed by: INTERNAL MEDICINE

## 2023-01-30 PROCEDURE — G0378 HOSPITAL OBSERVATION PER HR: HCPCS

## 2023-01-30 PROCEDURE — G0379 DIRECT REFER HOSPITAL OBSERV: HCPCS

## 2023-01-30 PROCEDURE — 85025 COMPLETE CBC W/AUTO DIFF WBC: CPT | Performed by: INTERNAL MEDICINE

## 2023-01-30 RX ORDER — SODIUM CHLORIDE 0.9 % (FLUSH) 0.9 %
10 SYRINGE (ML) INJECTION AS NEEDED
Status: DISCONTINUED | OUTPATIENT
Start: 2023-01-30 | End: 2023-01-31 | Stop reason: HOSPADM

## 2023-01-30 RX ORDER — ACETAMINOPHEN 500 MG
500 TABLET ORAL AS NEEDED
Status: DISCONTINUED | OUTPATIENT
Start: 2023-01-30 | End: 2023-01-31 | Stop reason: HOSPADM

## 2023-01-30 RX ORDER — CHOLECALCIFEROL (VITAMIN D3) 125 MCG
1000 CAPSULE ORAL DAILY
Status: DISCONTINUED | OUTPATIENT
Start: 2023-01-30 | End: 2023-01-31 | Stop reason: HOSPADM

## 2023-01-30 RX ORDER — POTASSIUM CHLORIDE 750 MG/1
20 TABLET, FILM COATED, EXTENDED RELEASE ORAL DAILY
Status: DISCONTINUED | OUTPATIENT
Start: 2023-01-30 | End: 2023-01-31

## 2023-01-30 RX ORDER — GABAPENTIN 300 MG/1
300 CAPSULE ORAL NIGHTLY
Status: DISCONTINUED | OUTPATIENT
Start: 2023-01-30 | End: 2023-01-31 | Stop reason: HOSPADM

## 2023-01-30 RX ORDER — ONDANSETRON 2 MG/ML
4 INJECTION INTRAMUSCULAR; INTRAVENOUS EVERY 6 HOURS PRN
Status: DISCONTINUED | OUTPATIENT
Start: 2023-01-30 | End: 2023-01-31 | Stop reason: HOSPADM

## 2023-01-30 RX ORDER — FOLIC ACID 1 MG/1
1000 TABLET ORAL DAILY
Status: DISCONTINUED | OUTPATIENT
Start: 2023-01-30 | End: 2023-01-31 | Stop reason: HOSPADM

## 2023-01-30 RX ORDER — ACETAMINOPHEN 325 MG/1
650 TABLET ORAL EVERY 4 HOURS PRN
Status: DISCONTINUED | OUTPATIENT
Start: 2023-01-30 | End: 2023-01-31 | Stop reason: HOSPADM

## 2023-01-30 RX ORDER — MODAFINIL 100 MG/1
200 TABLET ORAL DAILY
Status: DISCONTINUED | OUTPATIENT
Start: 2023-01-30 | End: 2023-01-30

## 2023-01-30 RX ORDER — DEXTROSE MONOHYDRATE 25 G/50ML
25 INJECTION, SOLUTION INTRAVENOUS
Status: DISCONTINUED | OUTPATIENT
Start: 2023-01-30 | End: 2023-01-31 | Stop reason: HOSPADM

## 2023-01-30 RX ORDER — SODIUM CHLORIDE 0.9 % (FLUSH) 0.9 %
10 SYRINGE (ML) INJECTION EVERY 12 HOURS SCHEDULED
Status: DISCONTINUED | OUTPATIENT
Start: 2023-01-30 | End: 2023-01-31 | Stop reason: HOSPADM

## 2023-01-30 RX ORDER — ACETAMINOPHEN 160 MG/5ML
650 SOLUTION ORAL EVERY 4 HOURS PRN
Status: DISCONTINUED | OUTPATIENT
Start: 2023-01-30 | End: 2023-01-31 | Stop reason: HOSPADM

## 2023-01-30 RX ORDER — LEVOTHYROXINE SODIUM 0.05 MG/1
50 TABLET ORAL DAILY
Status: DISCONTINUED | OUTPATIENT
Start: 2023-01-30 | End: 2023-01-31 | Stop reason: HOSPADM

## 2023-01-30 RX ORDER — NICOTINE POLACRILEX 4 MG
15 LOZENGE BUCCAL
Status: DISCONTINUED | OUTPATIENT
Start: 2023-01-30 | End: 2023-01-31 | Stop reason: HOSPADM

## 2023-01-30 RX ORDER — ASPIRIN 81 MG/1
81 TABLET ORAL DAILY
Status: DISCONTINUED | OUTPATIENT
Start: 2023-01-30 | End: 2023-01-31 | Stop reason: HOSPADM

## 2023-01-30 RX ORDER — ATORVASTATIN CALCIUM 20 MG/1
40 TABLET, FILM COATED ORAL NIGHTLY
Status: DISCONTINUED | OUTPATIENT
Start: 2023-01-30 | End: 2023-01-31 | Stop reason: HOSPADM

## 2023-01-30 RX ORDER — SODIUM CHLORIDE 9 MG/ML
40 INJECTION, SOLUTION INTRAVENOUS AS NEEDED
Status: DISCONTINUED | OUTPATIENT
Start: 2023-01-30 | End: 2023-01-31 | Stop reason: HOSPADM

## 2023-01-30 RX ORDER — ARMODAFINIL 250 MG/1
1 TABLET ORAL DAILY
Status: DISCONTINUED | OUTPATIENT
Start: 2023-01-30 | End: 2023-01-31 | Stop reason: HOSPADM

## 2023-01-30 RX ORDER — ACETAMINOPHEN 650 MG/1
650 SUPPOSITORY RECTAL EVERY 4 HOURS PRN
Status: DISCONTINUED | OUTPATIENT
Start: 2023-01-30 | End: 2023-01-31 | Stop reason: HOSPADM

## 2023-01-30 RX ORDER — LINEZOLID 600 MG/1
600 TABLET, FILM COATED ORAL EVERY 12 HOURS SCHEDULED
Status: DISCONTINUED | OUTPATIENT
Start: 2023-01-30 | End: 2023-01-31

## 2023-01-30 RX ADMIN — LINEZOLID 600 MG: 600 TABLET, FILM COATED ORAL at 20:49

## 2023-01-30 RX ADMIN — GABAPENTIN 300 MG: 300 CAPSULE ORAL at 20:45

## 2023-01-30 RX ADMIN — POTASSIUM CHLORIDE 20 MEQ: 750 TABLET, EXTENDED RELEASE ORAL at 20:45

## 2023-01-30 RX ADMIN — ASPIRIN 81 MG: 81 TABLET, COATED ORAL at 20:45

## 2023-01-30 RX ADMIN — Medication 10 ML: at 20:48

## 2023-01-30 RX ADMIN — Medication 150 MG: at 20:47

## 2023-01-30 RX ADMIN — ATORVASTATIN CALCIUM 40 MG: 20 TABLET, FILM COATED ORAL at 20:45

## 2023-01-30 NOTE — TELEPHONE ENCOUNTER
Supportive Oncology Services    Supportive call placed to pt regarding inability to obtain wake promoting agent; requested pt call to schedule apt for continuity of care, medication needs.

## 2023-01-31 ENCOUNTER — READMISSION MANAGEMENT (OUTPATIENT)
Dept: CALL CENTER | Facility: HOSPITAL | Age: 63
End: 2023-01-31
Payer: COMMERCIAL

## 2023-01-31 VITALS
TEMPERATURE: 97.6 F | DIASTOLIC BLOOD PRESSURE: 61 MMHG | HEART RATE: 63 BPM | SYSTOLIC BLOOD PRESSURE: 118 MMHG | OXYGEN SATURATION: 94 % | RESPIRATION RATE: 18 BRPM

## 2023-01-31 LAB
ALBUMIN SERPL-MCNC: 2.5 G/DL (ref 3.5–5.2)
ALBUMIN/GLOB SERPL: 0.6 G/DL
ALP SERPL-CCNC: 70 U/L (ref 39–117)
ALT SERPL W P-5'-P-CCNC: 8 U/L (ref 1–33)
ANION GAP SERPL CALCULATED.3IONS-SCNC: 7 MMOL/L (ref 5–15)
AST SERPL-CCNC: 14 U/L (ref 1–32)
BASOPHILS # BLD AUTO: 0.04 10*3/MM3 (ref 0–0.2)
BASOPHILS NFR BLD AUTO: 1.3 % (ref 0–1.5)
BILIRUB SERPL-MCNC: 0.3 MG/DL (ref 0–1.2)
BUN SERPL-MCNC: 23 MG/DL (ref 8–23)
BUN/CREAT SERPL: 5.3 (ref 7–25)
CALCIUM SPEC-SCNC: 9.3 MG/DL (ref 8.6–10.5)
CHLORIDE SERPL-SCNC: 105 MMOL/L (ref 98–107)
CO2 SERPL-SCNC: 26 MMOL/L (ref 22–29)
CREAT SERPL-MCNC: 4.37 MG/DL (ref 0.57–1)
DEPRECATED RDW RBC AUTO: 60.5 FL (ref 37–54)
EGFRCR SERPLBLD CKD-EPI 2021: 10.7 ML/MIN/1.73
EOSINOPHIL # BLD AUTO: 0.13 10*3/MM3 (ref 0–0.4)
EOSINOPHIL NFR BLD AUTO: 4.3 % (ref 0.3–6.2)
ERYTHROCYTE [DISTWIDTH] IN BLOOD BY AUTOMATED COUNT: 17.3 % (ref 12.3–15.4)
GLOBULIN UR ELPH-MCNC: 3.9 GM/DL
GLUCOSE BLDC GLUCOMTR-MCNC: 106 MG/DL (ref 70–130)
GLUCOSE BLDC GLUCOMTR-MCNC: 136 MG/DL (ref 70–130)
GLUCOSE BLDC GLUCOMTR-MCNC: 86 MG/DL (ref 70–130)
GLUCOSE BLDC GLUCOMTR-MCNC: 89 MG/DL (ref 70–130)
GLUCOSE SERPL-MCNC: 84 MG/DL (ref 65–99)
HCT VFR BLD AUTO: 23.2 % (ref 34–46.6)
HGB BLD-MCNC: 7.5 G/DL (ref 12–15.9)
IMM GRANULOCYTES # BLD AUTO: 0.01 10*3/MM3 (ref 0–0.05)
IMM GRANULOCYTES NFR BLD AUTO: 0.3 % (ref 0–0.5)
LYMPHOCYTES # BLD AUTO: 0.69 10*3/MM3 (ref 0.7–3.1)
LYMPHOCYTES NFR BLD AUTO: 22.8 % (ref 19.6–45.3)
MCH RBC QN AUTO: 31.3 PG (ref 26.6–33)
MCHC RBC AUTO-ENTMCNC: 32.3 G/DL (ref 31.5–35.7)
MCV RBC AUTO: 96.7 FL (ref 79–97)
MONOCYTES # BLD AUTO: 0.28 10*3/MM3 (ref 0.1–0.9)
MONOCYTES NFR BLD AUTO: 9.2 % (ref 5–12)
NEUTROPHILS NFR BLD AUTO: 1.88 10*3/MM3 (ref 1.7–7)
NEUTROPHILS NFR BLD AUTO: 62.1 % (ref 42.7–76)
NRBC BLD AUTO-RTO: 0 /100 WBC (ref 0–0.2)
PLATELET # BLD AUTO: 84 10*3/MM3 (ref 140–450)
PMV BLD AUTO: 9.9 FL (ref 6–12)
POTASSIUM SERPL-SCNC: 3.5 MMOL/L (ref 3.5–5.2)
PROT SERPL-MCNC: 6.4 G/DL (ref 6–8.5)
RBC # BLD AUTO: 2.4 10*6/MM3 (ref 3.77–5.28)
SODIUM SERPL-SCNC: 138 MMOL/L (ref 136–145)
WBC NRBC COR # BLD: 3.03 10*3/MM3 (ref 3.4–10.8)

## 2023-01-31 PROCEDURE — 82962 GLUCOSE BLOOD TEST: CPT

## 2023-01-31 PROCEDURE — G0378 HOSPITAL OBSERVATION PER HR: HCPCS

## 2023-01-31 PROCEDURE — 85025 COMPLETE CBC W/AUTO DIFF WBC: CPT | Performed by: INTERNAL MEDICINE

## 2023-01-31 PROCEDURE — 80053 COMPREHEN METABOLIC PANEL: CPT | Performed by: INTERNAL MEDICINE

## 2023-01-31 RX ORDER — CEPHALEXIN 500 MG/1
500 CAPSULE ORAL EVERY 12 HOURS SCHEDULED
Status: DISCONTINUED | OUTPATIENT
Start: 2023-01-31 | End: 2023-01-31 | Stop reason: HOSPADM

## 2023-01-31 RX ADMIN — Medication 150 MG: at 08:33

## 2023-01-31 RX ADMIN — FOLIC ACID 1000 MCG: 1 TABLET ORAL at 08:32

## 2023-01-31 RX ADMIN — Medication 10 ML: at 09:38

## 2023-01-31 RX ADMIN — ARMODAFINIL 250 MG: 250 TABLET ORAL at 08:32

## 2023-01-31 RX ADMIN — Medication 1000 MCG: at 08:32

## 2023-01-31 RX ADMIN — LINEZOLID 600 MG: 600 TABLET, FILM COATED ORAL at 08:32

## 2023-01-31 RX ADMIN — LEVOTHYROXINE SODIUM 50 MCG: 0.05 TABLET ORAL at 08:32

## 2023-01-31 RX ADMIN — ASPIRIN 81 MG: 81 TABLET, COATED ORAL at 08:32

## 2023-01-31 RX ADMIN — POTASSIUM CHLORIDE 20 MEQ: 750 TABLET, EXTENDED RELEASE ORAL at 08:32

## 2023-02-01 ENCOUNTER — TRANSITIONAL CARE MANAGEMENT TELEPHONE ENCOUNTER (OUTPATIENT)
Dept: CALL CENTER | Facility: HOSPITAL | Age: 63
End: 2023-02-01
Payer: COMMERCIAL

## 2023-02-01 NOTE — CASE MANAGEMENT/SOCIAL WORK
Case Management Discharge Note      Final Note: DC'd home. Wiley ELLISON RN         Selected Continued Care - Discharged on 1/31/2023 Admission date: 1/30/2023 - Discharge disposition: Home or Self Care    Destination    No services have been selected for the patient.              Durable Medical Equipment    No services have been selected for the patient.              Dialysis/Infusion    No services have been selected for the patient.              Home Medical Care    No services have been selected for the patient.              Therapy    No services have been selected for the patient.              Community Resources    No services have been selected for the patient.              Community & DME    No services have been selected for the patient.                Selected Continued Care - Episodes Includes continued care and service providers with selected services from the active episodes listed below    Oncology Episode start date: 11/15/2021   There are no active outsourced providers for this episode.               Transportation Services  Private: Car    Final Discharge Disposition Code: 01 - home or self-care

## 2023-02-01 NOTE — OUTREACH NOTE
Prep Survey    Flowsheet Row Responses   [facility] patient discharged from? [place]   Is LACE score < 7 ? No   Eligibility [facility]   Date of Admission 01/30/23   Date of Discharge 01/31/23   Discharge Disposition Home or Self Care   Discharge diagnosis Acute UTI    Does the patient have one of the following disease processes/diagnoses(primary or secondary)? Other   Does the patient have Home health ordered? No   Is there a DME ordered? No   Prep survey completed? Yes          DANYA COVINGTON - Registered Nurse

## 2023-02-01 NOTE — OUTREACH NOTE
Call Center TCM Note    Flowsheet Row Responses   [facility] patient discharged from? [place]   Does the patient have one of the following disease processes/diagnoses(primary or secondary)? Other   TCM attempt successful? Yes   Discharge diagnosis Acute UTI    Meds reviewed with patient/caregiver? Yes   Does the patient have all medications ordered at discharge? N/A   Does the patient have an appointment with their PCP within 7 days of discharge? No   Nursing Interventions Patient declined scheduling/rescheduling appointment at this time   Has home health visited the patient within 72 hours of discharge? N/A   Psychosocial issues? No   Did the patient receive a copy of their discharge instructions? Yes   Nursing interventions Reviewed instructions with patient   What is the patient's perception of their health status since discharge? Improving   Is the patient/caregiver able to teach back signs and symptoms related to disease process for when to call PCP? Yes   Is the patient/caregiver able to teach back signs and symptoms related to disease process for when to call 911? Yes   Is the patient/caregiver able to teach back the hierarchy of who to call/visit for symptoms/problems? PCP, Specialist, Home health nurse, Urgent Care, ED, 911 Yes   TCM call completed? Yes   Wrap up additional comments D/C Dx: Acute UTI - Pt states she feels great, no questions. No changes to medications at d/c. Pt declines my assist in sched TCM APPT with PCP Kiana Gramajo, and prefers to make her own appts.           Mary Villeda MA    2/1/2023, 14:56 EST

## 2023-02-02 ENCOUNTER — CLINICAL SUPPORT (OUTPATIENT)
Dept: ONCOLOGY | Facility: HOSPITAL | Age: 63
End: 2023-02-02
Payer: COMMERCIAL

## 2023-02-02 ENCOUNTER — LAB (OUTPATIENT)
Dept: LAB | Facility: HOSPITAL | Age: 63
End: 2023-02-02
Payer: COMMERCIAL

## 2023-02-02 DIAGNOSIS — C50.919 METASTATIC BREAST CANCER: ICD-10-CM

## 2023-02-02 LAB
BACTERIA SPEC AEROBE CULT: ABNORMAL
BASOPHILS # BLD AUTO: 0.07 10*3/MM3 (ref 0–0.2)
BASOPHILS NFR BLD AUTO: 2 % (ref 0–1.5)
DEPRECATED RDW RBC AUTO: 68.1 FL (ref 37–54)
EOSINOPHIL # BLD AUTO: 0.15 10*3/MM3 (ref 0–0.4)
EOSINOPHIL NFR BLD AUTO: 4.3 % (ref 0.3–6.2)
ERYTHROCYTE [DISTWIDTH] IN BLOOD BY AUTOMATED COUNT: 18.8 % (ref 12.3–15.4)
HCT VFR BLD AUTO: 26.7 % (ref 34–46.6)
HGB BLD-MCNC: 8.5 G/DL (ref 12–15.9)
IMM GRANULOCYTES # BLD AUTO: 0.02 10*3/MM3 (ref 0–0.05)
IMM GRANULOCYTES NFR BLD AUTO: 0.6 % (ref 0–0.5)
LYMPHOCYTES # BLD AUTO: 0.74 10*3/MM3 (ref 0.7–3.1)
LYMPHOCYTES NFR BLD AUTO: 21.2 % (ref 19.6–45.3)
MCH RBC QN AUTO: 32.1 PG (ref 26.6–33)
MCHC RBC AUTO-ENTMCNC: 31.8 G/DL (ref 31.5–35.7)
MCV RBC AUTO: 100.8 FL (ref 79–97)
MONOCYTES # BLD AUTO: 0.26 10*3/MM3 (ref 0.1–0.9)
MONOCYTES NFR BLD AUTO: 7.4 % (ref 5–12)
NEUTROPHILS NFR BLD AUTO: 2.25 10*3/MM3 (ref 1.7–7)
NEUTROPHILS NFR BLD AUTO: 64.5 % (ref 42.7–76)
NRBC BLD AUTO-RTO: 0 /100 WBC (ref 0–0.2)
PLATELET # BLD AUTO: 102 10*3/MM3 (ref 140–450)
PMV BLD AUTO: 10.1 FL (ref 6–12)
RBC # BLD AUTO: 2.65 10*6/MM3 (ref 3.77–5.28)
WBC NRBC COR # BLD: 3.49 10*3/MM3 (ref 3.4–10.8)

## 2023-02-02 PROCEDURE — G0463 HOSPITAL OUTPT CLINIC VISIT: HCPCS

## 2023-02-02 PROCEDURE — 36415 COLL VENOUS BLD VENIPUNCTURE: CPT

## 2023-02-02 PROCEDURE — 85025 COMPLETE CBC W/AUTO DIFF WBC: CPT

## 2023-02-02 NOTE — NURSING NOTE
Pt here for RN review, recent admission to hospital for f/u regarding UTI  She feels well today, is without complaints  Lab Results   Component Value Date    WBC 3.49 02/02/2023    HGB 8.5 (L) 02/02/2023    HCT 26.7 (L) 02/02/2023    .8 (H) 02/02/2023     (L) 02/02/2023     She continues Verzenio, has f/u in 2 weeks with NP

## 2023-02-13 ENCOUNTER — OFFICE VISIT (OUTPATIENT)
Dept: CARDIOLOGY | Facility: CLINIC | Age: 63
End: 2023-02-13
Payer: COMMERCIAL

## 2023-02-13 VITALS
WEIGHT: 293 LBS | DIASTOLIC BLOOD PRESSURE: 64 MMHG | BODY MASS INDEX: 45.99 KG/M2 | SYSTOLIC BLOOD PRESSURE: 118 MMHG | HEART RATE: 67 BPM | HEIGHT: 67 IN

## 2023-02-13 DIAGNOSIS — I10 PRIMARY HYPERTENSION: ICD-10-CM

## 2023-02-13 DIAGNOSIS — I35.0 NONRHEUMATIC AORTIC VALVE STENOSIS: ICD-10-CM

## 2023-02-13 DIAGNOSIS — Q23.1 BICUSPID AORTIC VALVE: Primary | ICD-10-CM

## 2023-02-13 PROCEDURE — 93000 ELECTROCARDIOGRAM COMPLETE: CPT | Performed by: INTERNAL MEDICINE

## 2023-02-13 PROCEDURE — 99024 POSTOP FOLLOW-UP VISIT: CPT | Performed by: INTERNAL MEDICINE

## 2023-02-13 NOTE — PROGRESS NOTES
Juana Hall  1960  Date of Office Visit: 02/13/23  Encounter Provider: Luis Rivers MD  Place of Service: [facility] CARDIOLOGY      CHIEF COMPLAINT:  Bicuspid aortic valve  Aortic valve stenosis  Breast cancer: Metastatic  Obesity  End-stage renal disease on hemodialysis  Chronic heart failure with preserved ejection fraction  Chronic normocytic anemia      HISTORY OF PRESENT ILLNESS:   62-year-old female with a medical history of metastatic breast cancer, end-stage renal disease on hemodialysis, normocytic anemia, obesity, immobility, chronic normocytic anemia, and bicuspid aortic valve with severe stenosis who presents to me for evaluation of the severe aortic valve stenosis. She did undergo a transthoracic echocardiogram in July 2022 with severe degenerative aortic valve stenosis and a mean gradient of 45 mmHg across her bicuspid aortic valve.   She subsequently was taken for balloon aortic valvuloplasty.  Her mean gradient initially was 46 mmHg and after a 20 and 22 mm Tyshak balloon we decreased that to 30 mmHg.  She did well with that and had no complications.  She has noticed improvement in her shortness of air since that time.  She also states that she has more energy as of late.  She denies any recent issues with chest pain.  She has a follow-up PET scan in March with her oncology team    Review of Systems   Constitutional: Negative for fever and malaise/fatigue.   HENT: Negative for nosebleeds and sore throat.    Eyes: Negative for blurred vision and double vision.   Cardiovascular: Negative for chest pain, claudication, palpitations and syncope.   Respiratory: Negative for cough, shortness of breath and snoring.    Endocrine: Negative for cold intolerance, heat intolerance and polydipsia.   Skin: Negative for itching, poor wound healing and rash.   Musculoskeletal: Negative for joint pain, joint swelling, muscle weakness and myalgias.   Gastrointestinal:  Negative for abdominal pain, melena, nausea and vomiting.   Neurological: Negative for light-headedness, loss of balance, seizures, vertigo and weakness.   Psychiatric/Behavioral: Negative for altered mental status and depression.          Past Medical History:   Diagnosis Date   • Anemia    • Anxiety and depression    • Bicuspid aortic valve    • Breast cancer (HCC) 2004    RIGHT, HAD NEELA. MASTECTOMY, CHEMO AND RADIATION   • CHF (congestive heart failure) (HCC)    • CKD (chronic kidney disease)    • Diabetes mellitus (HCC)    • Dialysis patient (HCC)     TUES AND SATURDAY INGATAL HAY   • Elevated cholesterol    • Frequent UTI    • Heart murmur    • History of kidney stones    • History of MRSA infection 2005    POST BREAST SURGERY-TREATED [facility]   • History of sepsis 2010    KIDNEY STONE   • History of transfusion    • Hyperlipidemia    • Hypertension    • Hyperthyroidism    • Hypothyroidism    • Kidney stone     CURRENT LEFT-DEC 2021   • Lymphedema of leg     LEFT   • Metastasis from breast cancer (HCC)     STOMACH-OMENTUM   • Neuromuscular disorder (HCC)    • Obesity    • ANDREW on CPAP     CPAP   • Osteoarthrosis    • Peripheral neuropathy     FEET BILAT   • Radiculopathy    • Rectal bleeding 8/16/2022   • Thickened endometrium    • Weakness     BILAT LEGS-WITH ANY AMT OF TIME       The following portions of the patient's history were reviewed and updated as appropriate: Social history , Family history and Surgical history     Current Outpatient Medications on File Prior to Visit   Medication Sig Dispense Refill   • abemaciclib (VERZENIO) 150 mg tablet chemo tablet Take 1 tablet by mouth 2 (Two) Times a Day. 60 tablet 5   • acetaminophen (TYLENOL) 500 MG tablet Take 500 mg by mouth As Needed.     • Armodafinil 250 MG tablet Take 1 tablet by mouth Daily. 30 tablet 2   • aspirin 81 MG EC tablet Take 81 mg by mouth Daily.     • atorvastatin (LIPITOR) 40 MG tablet TAKE ONE TABLET BY MOUTH ONCE NIGHTLY 30 tablet 0   • folic  "acid (FOLVITE) 1 MG tablet TAKE ONE TABLET BY MOUTH DAILY 30 tablet 2   • gabapentin (Neurontin) 300 MG capsule Take 1 capsule by mouth Every Night. 60 capsule 2   • Levemir FlexTouch 100 UNIT/ML injection Inject 50 Units under the skin into the appropriate area as directed Daily. 30 mL 0   • levothyroxine (SYNTHROID, LEVOTHROID) 50 MCG tablet Take 1 tablet by mouth Daily. 90 tablet 1   • midodrine (PROAMATINE) 5 MG tablet Take 1 tablet by mouth 3 (Three) Times a Day Before Meals. 90 tablet 0   • mupirocin (BACTROBAN) 2 % ointment      • NON FORMULARY Inject 1 dose under the skin into the appropriate area as directed Every 30 (Thirty) Days. facidex     • ondansetron (Zofran) 8 MG tablet Take 1 tablet by mouth Every 8 (Eight) Hours As Needed for Nausea or Vomiting. 30 tablet 2   • sertraline (ZOLOFT) 100 MG tablet Take 1.5 tablets by mouth Daily. 135 tablet 0   • vitamin B-12 (CYANOCOBALAMIN) 1000 MCG tablet Take 1,000 mcg by mouth Daily.     • [DISCONTINUED] cephalexin (Keflex) 500 MG capsule Take 1 capsule by mouth 2 (Two) Times a Day. 20 capsule 0     No current facility-administered medications on file prior to visit.       No Known Allergies    Vitals:    02/13/23 1234   BP: 118/64   Pulse: 67   Weight: (!) 165 kg (363 lb 12.1 oz)   Height: 170.2 cm (67\")     Body mass index is 56.97 kg/m².   Constitutional:       Appearance: Well-developed.      Comments: Obese.  In wheelchair.   Eyes:      General: No scleral icterus.     Conjunctiva/sclera: Conjunctivae normal.   HENT:      Head: Normocephalic and atraumatic.   Neck:      Thyroid: No thyromegaly.      Vascular: Normal carotid pulses. No carotid bruit, hepatojugular reflux or JVD.      Trachea: No tracheal deviation.   Pulmonary:      Effort: No respiratory distress.      Breath sounds: Normal breath sounds. No decreased breath sounds. No wheezing. No rhonchi. No rales.   Chest:      Chest wall: Not tender to palpatation.   Cardiovascular:      Normal rate. " Regular rhythm.      No gallop.   Pulses:     Carotid: 2+ bilaterally.     Radial: 2+ bilaterally.     Femoral: 2+ bilaterally.     Dorsalis pedis: 2+ bilaterally.     Posterior tibial: 2+ bilaterally.  Edema:     Peripheral edema absent.   Abdominal:      General: Bowel sounds are normal. There is no distension.      Palpations: Abdomen is soft.      Tenderness: There is no abdominal tenderness.   Musculoskeletal:         General: No deformity.      Cervical back: Normal range of motion and neck supple. Skin:     Findings: No erythema or rash.   Neurological:      Mental Status: Alert and oriented to person, place, and time.      Sensory: No sensory deficit.   Psychiatric:         Behavior: Behavior normal.            Lab Results   Component Value Date    WBC 3.49 02/02/2023    HGB 8.5 (L) 02/02/2023    HCT 26.7 (L) 02/02/2023    .8 (H) 02/02/2023     (L) 02/02/2023       Lab Results   Component Value Date    GLUCOSE 84 01/31/2023    BUN 23 01/31/2023    CREATININE 4.37 (H) 01/31/2023    EGFRIFNONA 11 (L) 02/16/2022    EGFRIFAFRI  01/29/2022      Comment:      <15 Indicative of kidney failure.    BCR 5.3 (L) 01/31/2023    K 3.5 01/31/2023    CO2 26.0 01/31/2023    CALCIUM 9.3 01/31/2023    PROTENTOTREF 7.0 03/16/2019    ALBUMIN 2.5 (L) 01/31/2023    LABIL2 1.2 03/16/2019    AST 14 01/31/2023    ALT 8 01/31/2023       Lab Results   Component Value Date    GLUCOSE 84 01/31/2023    CALCIUM 9.3 01/31/2023     01/31/2023    K 3.5 01/31/2023    CO2 26.0 01/31/2023     01/31/2023    BUN 23 01/31/2023    CREATININE 4.37 (H) 01/31/2023    EGFRIFAFRI  01/29/2022      Comment:      <15 Indicative of kidney failure.    EGFRIFNONA 11 (L) 02/16/2022    BCR 5.3 (L) 01/31/2023    ANIONGAP 7.0 01/31/2023       Lab Results   Component Value Date    CHOL 81 01/11/2023    CHLPL 122 02/16/2022    TRIG 99 01/11/2023    HDL 26 (L) 01/11/2023    LDL 36 01/11/2023         ECG 12 Lead    Date/Time: 2/13/2023 1:03  PM  Performed by: Luis Rivers MD  Authorized by: Luis Rivers MD   Comparison: compared with previous ECG from 1/11/2023  Rhythm: sinus rhythm  Rate: normal  QRS axis: normal               Results for orders placed during the hospital encounter of 01/10/23    Adult Transthoracic Echo Limited W/ Cont if Necessary Per Protocol    Interpretation Summary  •  Moderate to severe aortic valve stenosis is present.      DISCUSSION/SUMMARY  62-year-old female with a medical history of morbid obesity, end-stage renal disease on hemodialysis, immobility, metastatic breast cancer, who presents to me for evaluation of severe bicuspid aortic valve stenosis.  She does have dyspnea on exertion, however it is tough to differentiate the cause of her dyspnea from her immobility and deconditioning along with obesity or her aortic valve stenosis.  She did subsequently undergo balloon aortic valvuloplasty with improvement in her gradients across the aortic valve and improvement in her dyspnea and fatigue.    1.  Bicuspid aortic valve stenosis: Severe  - Status post balloon aortic valvuloplasty.  Mean gradient improved to the moderate range.  No significant regurgitation documented  - Recommend continuing to follow with hematology oncology.  We would need to see substantial improvement in her cancer burden but also improvement in her CBC prior to considering any permanent valve replacement therapies.    2.  End-stage renal disease on hemodialysis: Continue to defer to nephrology     3.  Chronic heart failure with preserved ejection fraction: The report association class II symptoms.  Volume management with hemodialysis

## 2023-02-16 ENCOUNTER — INFUSION (OUTPATIENT)
Dept: ONCOLOGY | Facility: HOSPITAL | Age: 63
End: 2023-02-16
Payer: COMMERCIAL

## 2023-02-16 ENCOUNTER — OFFICE VISIT (OUTPATIENT)
Dept: ONCOLOGY | Facility: CLINIC | Age: 63
End: 2023-02-16
Payer: COMMERCIAL

## 2023-02-16 ENCOUNTER — LAB (OUTPATIENT)
Dept: LAB | Facility: HOSPITAL | Age: 63
End: 2023-02-16
Payer: COMMERCIAL

## 2023-02-16 VITALS
HEART RATE: 71 BPM | RESPIRATION RATE: 18 BRPM | DIASTOLIC BLOOD PRESSURE: 65 MMHG | BODY MASS INDEX: 45.99 KG/M2 | TEMPERATURE: 96.4 F | SYSTOLIC BLOOD PRESSURE: 123 MMHG | OXYGEN SATURATION: 97 % | HEIGHT: 67 IN | WEIGHT: 293 LBS

## 2023-02-16 DIAGNOSIS — Z79.899 HIGH RISK MEDICATION USE: Primary | ICD-10-CM

## 2023-02-16 DIAGNOSIS — N18.30 ANEMIA IN STAGE 3 CHRONIC KIDNEY DISEASE, UNSPECIFIED WHETHER STAGE 3A OR 3B CKD: ICD-10-CM

## 2023-02-16 DIAGNOSIS — C50.919 METASTATIC BREAST CANCER: Primary | ICD-10-CM

## 2023-02-16 DIAGNOSIS — C50.919 METASTATIC BREAST CANCER: ICD-10-CM

## 2023-02-16 DIAGNOSIS — D63.1 ANEMIA DUE TO CHRONIC KIDNEY DISEASE, ON CHRONIC DIALYSIS: ICD-10-CM

## 2023-02-16 DIAGNOSIS — D63.1 ANEMIA IN STAGE 3 CHRONIC KIDNEY DISEASE, UNSPECIFIED WHETHER STAGE 3A OR 3B CKD: ICD-10-CM

## 2023-02-16 DIAGNOSIS — N18.6 ANEMIA DUE TO CHRONIC KIDNEY DISEASE, ON CHRONIC DIALYSIS: ICD-10-CM

## 2023-02-16 DIAGNOSIS — Z99.2 ANEMIA DUE TO CHRONIC KIDNEY DISEASE, ON CHRONIC DIALYSIS: ICD-10-CM

## 2023-02-16 LAB
ABO GROUP BLD: NORMAL
ALBUMIN SERPL-MCNC: 3 G/DL (ref 3.5–5.2)
ALBUMIN/GLOB SERPL: 0.6 G/DL (ref 1.1–2.4)
ALP SERPL-CCNC: 72 U/L (ref 38–116)
ALT SERPL W P-5'-P-CCNC: 7 U/L (ref 0–33)
ANION GAP SERPL CALCULATED.3IONS-SCNC: 13.4 MMOL/L (ref 5–15)
ANTI-E: NORMAL
AST SERPL-CCNC: 16 U/L (ref 0–32)
BASOPHILS # BLD AUTO: 0.04 10*3/MM3 (ref 0–0.2)
BASOPHILS NFR BLD AUTO: 1.4 % (ref 0–1.5)
BILIRUB SERPL-MCNC: 0.4 MG/DL (ref 0.2–1.2)
BLD GP AB SCN SERPL QL: POSITIVE
BUN SERPL-MCNC: 31 MG/DL (ref 6–20)
BUN/CREAT SERPL: 6.8 (ref 7.3–30)
CALCIUM SPEC-SCNC: 9.1 MG/DL (ref 8.5–10.2)
CHLORIDE SERPL-SCNC: 96 MMOL/L (ref 98–107)
CO2 SERPL-SCNC: 25.6 MMOL/L (ref 22–29)
CREAT SERPL-MCNC: 4.55 MG/DL (ref 0.6–1.1)
DAT POLY-SP REAG RBC QL: NEGATIVE
DEPRECATED RDW RBC AUTO: 75.9 FL (ref 37–54)
EGFRCR SERPLBLD CKD-EPI 2021: 10.2 ML/MIN/1.73
EOSINOPHIL # BLD AUTO: 0.2 10*3/MM3 (ref 0–0.4)
EOSINOPHIL NFR BLD AUTO: 6.8 % (ref 0.3–6.2)
ERYTHROCYTE [DISTWIDTH] IN BLOOD BY AUTOMATED COUNT: 19.3 % (ref 12.3–15.4)
GLOBULIN UR ELPH-MCNC: 4.8 GM/DL (ref 1.8–3.5)
GLUCOSE SERPL-MCNC: 103 MG/DL (ref 74–124)
HCT VFR BLD AUTO: 23.8 % (ref 34–46.6)
HGB BLD-MCNC: 7.3 G/DL (ref 12–15.9)
IMM GRANULOCYTES # BLD AUTO: 0.02 10*3/MM3 (ref 0–0.05)
IMM GRANULOCYTES NFR BLD AUTO: 0.7 % (ref 0–0.5)
LYMPHOCYTES # BLD AUTO: 0.46 10*3/MM3 (ref 0.7–3.1)
LYMPHOCYTES NFR BLD AUTO: 15.6 % (ref 19.6–45.3)
MCH RBC QN AUTO: 32.9 PG (ref 26.6–33)
MCHC RBC AUTO-ENTMCNC: 30.7 G/DL (ref 31.5–35.7)
MCV RBC AUTO: 107.2 FL (ref 79–97)
MONOCYTES # BLD AUTO: 0.22 10*3/MM3 (ref 0.1–0.9)
MONOCYTES NFR BLD AUTO: 7.5 % (ref 5–12)
NEUTROPHILS NFR BLD AUTO: 2 10*3/MM3 (ref 1.7–7)
NEUTROPHILS NFR BLD AUTO: 68 % (ref 42.7–76)
NRBC BLD AUTO-RTO: 0 /100 WBC (ref 0–0.2)
PLATELET # BLD AUTO: 111 10*3/MM3 (ref 140–450)
PMV BLD AUTO: 9.6 FL (ref 6–12)
POTASSIUM SERPL-SCNC: 3.6 MMOL/L (ref 3.5–4.7)
PROT SERPL-MCNC: 7.8 G/DL (ref 6.3–8)
RBC # BLD AUTO: 2.22 10*6/MM3 (ref 3.77–5.28)
RH BLD: POSITIVE
SODIUM SERPL-SCNC: 135 MMOL/L (ref 134–145)
T&S EXPIRATION DATE: NORMAL
WBC NRBC COR # BLD: 2.94 10*3/MM3 (ref 3.4–10.8)

## 2023-02-16 PROCEDURE — 86901 BLOOD TYPING SEROLOGIC RH(D): CPT | Performed by: NURSE PRACTITIONER

## 2023-02-16 PROCEDURE — 86870 RBC ANTIBODY IDENTIFICATION: CPT | Performed by: NURSE PRACTITIONER

## 2023-02-16 PROCEDURE — 25010000002 FULVESTRANT PER 25 MG: Performed by: NURSE PRACTITIONER

## 2023-02-16 PROCEDURE — 86900 BLOOD TYPING SEROLOGIC ABO: CPT | Performed by: NURSE PRACTITIONER

## 2023-02-16 PROCEDURE — 80053 COMPREHEN METABOLIC PANEL: CPT

## 2023-02-16 PROCEDURE — 85025 COMPLETE CBC W/AUTO DIFF WBC: CPT

## 2023-02-16 PROCEDURE — 86850 RBC ANTIBODY SCREEN: CPT | Performed by: NURSE PRACTITIONER

## 2023-02-16 PROCEDURE — 86920 COMPATIBILITY TEST SPIN: CPT

## 2023-02-16 PROCEDURE — 86922 COMPATIBILITY TEST ANTIGLOB: CPT

## 2023-02-16 PROCEDURE — 36415 COLL VENOUS BLD VENIPUNCTURE: CPT

## 2023-02-16 PROCEDURE — 86880 COOMBS TEST DIRECT: CPT | Performed by: NURSE PRACTITIONER

## 2023-02-16 PROCEDURE — 99214 OFFICE O/P EST MOD 30 MIN: CPT | Performed by: NURSE PRACTITIONER

## 2023-02-16 PROCEDURE — 96402 CHEMO HORMON ANTINEOPL SQ/IM: CPT

## 2023-02-16 PROCEDURE — 86902 BLOOD TYPE ANTIGEN DONOR EA: CPT

## 2023-02-16 RX ORDER — SODIUM CHLORIDE 9 MG/ML
250 INJECTION, SOLUTION INTRAVENOUS AS NEEDED
Status: CANCELLED | OUTPATIENT
Start: 2023-02-17

## 2023-02-16 RX ORDER — DIPHENHYDRAMINE HCL 25 MG
25 CAPSULE ORAL ONCE
Status: CANCELLED | OUTPATIENT
Start: 2023-02-17 | End: 2023-02-16

## 2023-02-16 RX ORDER — ACETAMINOPHEN 325 MG/1
650 TABLET ORAL ONCE
Status: CANCELLED | OUTPATIENT
Start: 2023-02-17 | End: 2023-02-16

## 2023-02-16 RX ORDER — LAMOTRIGINE 25 MG/1
500 TABLET ORAL ONCE
Status: COMPLETED | OUTPATIENT
Start: 2023-02-16 | End: 2023-02-16

## 2023-02-16 RX ORDER — LAMOTRIGINE 25 MG/1
500 TABLET ORAL ONCE
Status: CANCELLED
Start: 2023-02-16 | End: 2023-02-16

## 2023-02-16 RX ADMIN — FULVESTRANT 500 MG: 50 INJECTION, SOLUTION INTRAMUSCULAR at 10:38

## 2023-02-16 NOTE — PROGRESS NOTES
"Chief Complaint  Metastatic lobular breast cancer (ER/WI positive, HER-2/tj negative), anemia secondary to CKD3, CHF, peripheral neuropathy, chemotherapy related nausea chemotherapy-induced myelosuppression    Subjective        History of Present Illness  Juana is seen back today in follow-up due for monthly Faslodex along with continuing abemaciclib 150 mg twice daily.  Unfortunately patient was hospitalized again briefly in late January with recurrent urinary tract infection.  She was given additional oral Keflex at discharge which she has completed.  She does not feel that her symptoms have completely resolved but does not feel she can give us a urine today.  She does plan to continue to monitor herself and follow-up with her PCP if worsening.    Patient's hemoglobin today is just 7.3 and she is feeling more short of breath, fatigue and has obvious pallor.  We discussed therefore proceeding with transfusion of 2 units PRBCs.    She otherwise denies further concerns at this time.    Objective   Vital Signs:   /65   Pulse 71   Temp 96.4 °F (35.8 °C) (Temporal)   Resp 18   Ht 170.2 cm (67.01\")   Wt (!) 164 kg (361 lb 8.9 oz)   BMI 56.61 kg/m²     Physical Exam  Constitutional:       Appearance: She is well-developed. She is obese.      Comments: Patient is in a motorized scooter today   Eyes:      Conjunctiva/sclera: Conjunctivae normal.   Neck:      Thyroid: No thyromegaly.   Cardiovascular:      Rate and Rhythm: Normal rate and regular rhythm.      Heart sounds: Murmur heard.     No friction rub. No gallop.      Comments: 2/6 murmur  Pulmonary:      Effort: No respiratory distress.      Breath sounds: Normal breath sounds.   Abdominal:      General: Bowel sounds are normal. There is no distension.      Palpations: Abdomen is soft.      Tenderness: There is no abdominal tenderness.   Musculoskeletal:         General: Swelling present.      Comments: 1+ bilateral lower extremity edema with venous stasis " changes noted.  Left upper extremity fistula   Lymphadenopathy:      Head:      Right side of head: No submandibular adenopathy.      Cervical: No cervical adenopathy.      Upper Body:      Right upper body: No supraclavicular adenopathy.      Left upper body: No supraclavicular adenopathy.   Skin:     General: Skin is warm and dry.      Findings: No bruising or rash.   Neurological:      Mental Status: She is alert and oriented to person, place, and time.      Cranial Nerves: No cranial nerve deficit.      Motor: No abnormal muscle tone.      Deep Tendon Reflexes: Reflexes normal.   Psychiatric:         Behavior: Behavior normal.       I have reexamined the patient and the results are consistent with the previously documented exam. Ansley Flores, APRN       Result Review :   Results from last 7 days   Lab Units 02/16/23  0938   WBC 10*3/mm3 2.94*   NEUTROS ABS 10*3/mm3 2.00   HEMOGLOBIN g/dL 7.3*   HEMATOCRIT % 23.8*   PLATELETS 10*3/mm3 111*                    Assessment and Plan    *Metastatic lobular breast cancer (ER/MD positive, HER-2/tj negative):  · Previous stage IIIC right breast cancer in 2003, received neoadjuvant chemotherapy (unknown regimen) with subsequent bilateral mastectomies 1/22/2004 with right axillary dissection.  Residual 10-12 cm invasive lobular carcinoma on the right breast with 14/16+ nodes with extranodal extension.  Received adjuvant carboplatin and Navelbine chemotherapy x4 cycles  · Attempted adjuvant radiation to the chest wall however interrupted due to cellulitis that required removal of implants in August 2004  · Received tamoxifen from 6/22/2004 until June 2009.  Transitioned to Femara and received until June 2014  · Identification of CT findings concerning for carcinomatosis on CT scans and June 2019.  · PET scan confirmed hypermetabolic activity in the omentum as well as small retroperitoneal lymph nodes.  · CT-guided omental biopsy 7/30/2019 with metastatic lobular  carcinoma of breast primary, ER positive (greater than 95%), [place] positive (90%), HER-2/tj negative (1+ IHC)  · [facility] liquid biopsy 2/5/2020: MSI undetermined, mutations in BTEH, NF1, CHEK2.  No evidence of PIK3CA mutation.  · Subsequent [facility] germline testing 11/12/2021 with BETH VUS (c.2864C>A) and POLE VUS (c.631A>T)  · Initiation of Aromasin and Ibrance in August 2019  · Difficulty with myelosuppression related to Ibrance requiring dose alterations, eventually changed to 100 mg for 7 days on followed by 7 days off.  · CT chest abdomen pelvis 10/26/2021 with increase in left hydronephrosis with increase in left perinephric and periureteral stranding. Decrease in stone in the left kidney lower pole (7 mm).  Stable small retroperitoneal lymph and left periaortic lymph nodes.  · PET scan 11/10/2021 with multiple bilateral hypermetabolic nodular pulmonary lesions, asymmetric moderate to severe left hydronephrosis with ill-defined fat stranding and soft tissue thickening in the renal sinus and surrounding the left ureter, hypermetabolic left retroperitoneal lymph node with slight increase in size, ill-defined hypermetabolic thickening in the inferior omentum with increase in size, uptake and C7 (indeterminate, recommended MRI).  Short segments of uptake in the mid and distal esophagus possibly related to gastritis.  · PET scan findings felt to be consistent with progressive malignancy.  Patient declined repeat omental biopsy.   · Options for further treatment discussed on 11/12/2021.  In light of renal failure and dialysis, options are more limited.  Recommendation to continue Ibrance and discontinue Aromasin, begin Faslodex.  Discussed possible future use of single agent Taxol.    · Aromasin discontinued 11/12/2021  · On 11/22/2021 initiation of Faslodex with continuation of Ibrance (100 mg daily for 7 days on followed by 7 days off).  · MRI cervical spine 11/18/2021 showed the right C7 normality to  likely represent metastatic disease.  With solitary bone lesion, did not recommend pursuit of bone modifying agent.  · Following 2 cycles Faslodex/Ibrance, PET scan on 1/11/2022 showed no change in the soft tissue superior to the dome of the bladder with hypermetabolic activity (likely related to carcinomatosis).  Significant decrease in injection of fat around the left renal pelvis and stable retroperitoneal hypermetabolic lymph nodes, decrease in size and activity in small bilateral pulmonary nodules.  Findings felt to represent evidence of response to treatment.    · Ibrance held briefly beginning 1/28/2022 due to hospitalization with COVID-19 infection and C. difficile colitis.  Patient developed leukopenia/neutropenia.  Ibrance resumed at previous dosing (100 mg daily 7 days on followed by 7 days off) on 2/9/22.  · Following 5 cycles Faslodex/Ibrance PET scan 4/19/2022 with evidence of mixed response.  New hypermetabolic lesion spinous process L2 (SUV 5.1) and slight increase in activity left clavicular metastasis (SUV increased 4.6-8.1).  C7 hypermetabolic mixed lytic and blastic bone lesion was unchanged.  Decrease in uptake involving omental thickening.  Stable soft tissue thickening around the left renal pelvis and ureter.  Discussion again regarding potential pursuit of IV chemotherapy with Taxol versus continuation of Ibrance and Faslodex with radiation to sites of bony disease at L2, left clavicle, C7.  Patient opted to defer initiation of systemic chemotherapy and continue Ibrance/Faslodex with consideration of radiation.  · Initiated monthly Xgeva on 5/19/2022 (cleared with nephrology).  Xgeva subsequently held due to significant hypocalcemia.  Decision made to forego further Xgeva with persistent hypocalcemia.  · Palliative radiation received to lumbar spine regarding L2 metastasis 5/23- 6/7/2022  · Ibrance held during radiation therapy beginning 5/22/2022.  Ibrance resumed 6/16/2022.  · PET scan  7/21/2022 with stable hypermetabolic abdominal pelvic lymph nodes and subcarinal lymph node, stable bone lesions at C7, left clavicle.  Stable soft tissue thickening around the left renal pelvis with left hydronephrosis.  Uptake in adrenal glands felt to be likely reactive (no change in size).  No activity noted at L2 (previously radiated).  New hypermetabolic lesion right anterior sixth rib.  · With evidence of further slight progression in bone on PET scan (new right sixth rib lesion), on 7/28/2022 discontinued Ibrance and plan to transition to abemaciclib with continuation of Faslodex.    · On 8/4/2022 initiated abemaciclib at reduced dose of 50 mg twice daily.  · On 8/25/2022, increased abemaciclib dose to 100 mg twice daily  · PET scan 10/6/2022 with stable findings with exception of left acetabular activity report noted new activity however on prior PET scan question of focal activity present previously.  No corresponding CT abnormality and significant increase in SUV at sacral lesion.  Scan otherwise stable.  Decision to continue current treatment  · Abemaciclib dose increased on 10/13/2022 up to 150 mg twice daily  · PET scan 12/29/2022 with artifactual accumulation of tracer right axilla and right mastectomy bed without visualized CT abnormality.  Hypermetabolic bone lesions with decrease in level of activity.  No new findings.  · Patient returns today in follow-up due for cycle 16 Faslodex 500 mg IM every 4 weeks along with continuing abemaciclib 150 mg twice daily.  Most recent PET scan in December thankfully showed improvement in bony disease with no new areas of concern.  Therefore continuing on current therapy.  She will return in 2 weeks for repeat CBC and RN review and otherwise return in 4 weeks for follow-up with Dr. Irvin and cycle 17 Faslodex.  We will plan to pursue repeat PET scan at 3-month interval.    *Anemia secondary to ESRD, underlying malignancy/chronic disease, myelosuppression from CDK  4/6 inhibitor, GI blood loss:  · Anemia secondary to ESRD, malignancy with exacerbation by use of Ibrance  · Previous evidence of folate deficiency 8/12/2019 with level 3.84, initiated folic acid 1 mg daily  · Previous evidence of iron deficiency, received Injectafer on 8/7/2020 750 mg IV x1 dose.  Second dose not administered due to onset of fever, subsequent iron studies precluded further administration of IV iron.  · Previous low normal B12 level initiated oral B12 1000 mcg daily.  · Patient had previously received Procrit and required intermittent transfusion support  · Following initiation of hemodialysis, management of BEAU transitioned to nephrology, receiving Epogen with dialysis.  · Ongoing transfusion support prompted alteration in Ibrance dosing on 8/23/2021.  · After alteration in Ibrance dosing, hemoglobin improved and remained stable in the 9-10 range.  · Improved but ongoing intermittent need for transfusion support despite Ibrance dose alteration  · Stool negative for occult blood x3 on 11/2/2021  · Patient with reduced dialysis frequency to 2 days/week with concurrent decrease in Epogen dosing corresponding again to increased transfusion requirement.  · Epogen dose increased per nephrology to 20,000 units twice weekly with dialysis on Mondays and Fridays  · Ibrance again exacerbating anemia with ongoing intermittent transfusion requirements.  Ibrance subsequently discontinued on 7/28/2022 and patient initiated abemaciclib on 8/4/2022 which may be less myelosuppressive.  · Additional transfusions required with hemoglobin on 3/31/2022 6.6, hemoglobin 4/21/2022 of 6.4, hemoglobin on 5/5/2022 of 6.8, hemoglobin 6.4 on 7/14/2022, hemoglobin 6.4 on 7/28/2022, hemoglobin 6.8 on 8/18/2022.  · Patient did develop transient visible blood in stool in July 2022  · Anemia evaluation 7/28/2022 with iron 32, ferritin 412, iron saturation 15%, TIBC 210, folate 19.9, B12 925, haptoglobin 300, .  · Stool  positive for occult blood x3 on 8/1/2022  · Patient was seen by GI on 8/16/2022, felt to be high risk for endoscopic evaluation and elected to forego EGD/colonoscopy for now  · Patient transitioned from hemodialysis in clinic to home hemodialysis 5 days/week x 2 hours beginning 8/29/2022.  With transition to home dialysis, nephrology transition the patient from Epogen to Mircera administered every 4 weeks in their office, initiated 8/22/2022.  · Patient returns today with hemoglobin of 7.3 and she is symptomatic with fatigue and shortness of breath.  Obvious pallor on exam.  We will pursue transfusion of 2 units PRBCs.    *ESRD on HD:  · Patient with previous CKD3/4  · Hospitalization 4/29-5/4/2021 with RYAN/CKD, UTI, anemia.  Required initiation of hemodialysis Tuesday Thursday Saturday.   · Decrease in frequency of dialysis to twice per week.  She continues to produce a significant amount of urine.   · Status post left upper extremity AV fistula placement on 11/3/2021 by vascular surgery  · Attempt to hold further dialysis on 1/11/2022 with close monitoring of her laboratory and clinical parameters.  Dialysis quickly resumed due to development of hyperkalemia.  · Patient was undergoing dialysis 2 days/week on Mondays and Fridays.   · On 8/22/2022 patient transitioned to use of home dialysis device 5 days/week x 2 hours.    *CHF with severe aortic stenosis:  · Echocardiogram 7/12/2019 showing ejection fraction 48% with moderate dilation of the LV cavity, global hypokinesis, grade 2 diastolic dysfunction, mild to moderate aortic stenosis, trivial pericardial effusion.  · Echocardiogram 7/19/2021 with ejection fraction 56%, left atrial volume moderately increased, grade 2 diastolic dysfunction, severe calcification aortic valve, moderate aortic stenosis, severe mitral calcification, mild mitral stenosis, marked elevation in RVSP from tricuspid regurgitation.  · Echocardiogram on 7/13/2022 with ejection fraction  56-60%, grade 2 diastolic dysfunction, severe aortic stenosis.    · Cardiac catheterization 8/23/2022 showed normal coronary arteries, mild to moderate pulmonary hypertension.    · She was evaluated by cardiothoracic surgery Dr. Pagan and there was discussion regarding potential candidacy for TAVR although there was concern given her underlying comorbidities including her metastatic breast cancer.  I discussed patient's prognosis with Dr. Pagan.  She does have opportunities for additional treatment of her malignancy with intravenous chemotherapy and is willing to pursue this in the future when needed.  It is unclear as to her expected survival however given her multiple severe comorbidities with metastatic breast cancer, ESRD on dialysis, severe aortic stenosis, severe anemia.  There is consideration of possible pursuit of balloon valvuloplasty initially to assess her symptomatic response and then consider pursuit of TAVR in the future.   · Patient did undergo aortic valvuloplasty during admission 1/10 - 1/11/2023.    *Left upper and bilateral lower extremity peripheral neuropathy:  · Patient reports that left upper extremity neuropathy was not present from previous chemotherapy but occurred after hospitalization that required intubation and critical care stay.  Apparently felt to represent critical care neuropathy.  Improved over time.  · Bilateral lower extremity neuropathy felt to be related to diabetes  · May have future implications regarding treatment for breast cancer.  · Patient reports that peripheral neuropathy symptoms continue in the bilateral lower extremities, unchanged.  This will be a consideration with potential future use of Taxol    *Uterine/endometrial activity on PET scan:  · Patient experienced postmenopausal vaginal bleeding in 2013, negative endometrial biopsy and gynecologic evaluation.  · With findings on PET scan with enlarging uterus and some hypermetabolic activity, referred back to   [facility] in gynecology.    · 9/19/2019 D&C with hysteroscopy by Dr. Oliveros, no significant intraoperative findings, pathologic results with benign findings, no evidence of malignancy.    *Nausea:  · Mild, relieved with Zofran.   · Patient experienced improvement in nausea after initiating hemodialysis  · No recent nausea    *Myelosuppression secondary to CDK 4/6 inhibitor:  · Patient was able to maintain adequate WBC after dosing alteration on Ibrance to treatment at 100 mg daily for 7 days on followed by 7 days off.  · Subsequent transition from Ibrance to abemaciclib  · Today, WBC 2.9    *Depression  · Patient with history of depression, worsened after the death of her son in November 2021  · With evidence of progressive malignancy in November 2021, patient agreeable to referral to supportive oncology  · Patient currently receiving gabapentin 300 mg nightly, Zoloft 150 mg daily, armodafinil 250 mg daily  · Patient does continue with difficulties regarding depression that wax and wane.  Currently symptoms under fair control.  Patient last seen by supportive oncology on 9/26/2022.    *Left perinephric stranding secondary to malignancy with hydronephrosis:  · CT 4/22/2021 showed interval enlargement of 2 staghorn calculi in the left kidney with persistent left perinephric stranding  · Hospitalization 4/29-5/4/2021 with RYAN/CKD, UTI, anemia.  Required 2 unit PRBC transfusion and initiated hemodialysis Tuesday Thursday Saturday.    · Discussion from urology regarding potential need for surgical procedure to address staghorn calculus left kidney  · CT scan 7/2/2021 with only 1 remaining left renal stone identified which had decreased in size.  Patient appears to have passed the other stone.  · As above, CT 10/26/2021 with more prominent left hydronephrosis and increase in left perinephric and periureteral stranding.  Felt to possibly be related to passage of recent stone versus partial obstruction secondary to debris or  thrombus in the left ureter versus pyelonephritis.  Patient however with no clinical evidence of recent stone passage nor pyelonephritis. Malignancy felt to be the cause of CT changes around the left kidney at this point.   · Left ureteral stent replacement 12/16/2021  · PET scan 1/11/2022 with decrease in fat injection near left renal pelvis, stent in satisfactory position.  Mild residual hydronephrosis on the left.  · Ureteral stent replaced on 3/10/2022  · PET scan 4/19/2022 with no hydronephrosis, left ureteral stent in place  · PET scan 7/21/2022 with persistent left hydronephrosis, ureteral stent in place  · PET scan 10/6/2022 with left nephroureteral stent in place, overall stable findings  · PET scan 12/29/2022 with left nephroureteral stent remaining in place, stable findings    *Right thyroid nodules  · Patient underwent prior thyroid biopsy by her report with benign findings many years ago, records not available  · On MRI cervical spine 11/18/2021, finding of 1.8 cm right thyroid nodule  · Thyroid ultrasound 11/26/2021 with right-sided thyroid nodules, 1 nodule was hypoechoic, solid measuring 2 cm with biopsy recommended.  · Thyroid ultrasound results reviewed with patient.  In light of her metastatic breast cancer, renal failure the significance of the thyroid nodule is felt to be much less.  We discussed possibility of thyroid biopsy however patient is inclined to forego this, especially since she has had a biopsy performed in the past with benign findings by her report.    · No hypermetabolic activity identified on PET scan 1/11/2022 in the neck    *Hospitalization 1/28-1/30/2022 due to COVID-19 infection and C. difficile colitis  · Patient fully vaccinated with 3 doses Moderna COVID-19 vaccine  · Patient developed symptoms 1/26/2022 with abrupt onset sinus congestion, mild cough, subsequently developed nausea and diarrhea on 1/27/2022.  Significant fatigue.  · Patient tested positive in emergency  department on 1/28/2022 and was admitted  · Patient maintained O2 saturation in mid 90% range on room air  · Patient received remdesivir  · Patient was also found to be positive for C. difficile colitis, received course of oral vancomycin.  · Symptoms from both COVID-19 and C. difficile colitis ultimately resolved.    *Hypocalcemia  · Patient developed significant hypocalcemia after receiving Xgeva on 5/19/2022  · Calcium management per nephrology  · No further Xgeva planned due to persistent significant hypocalcemia  · Calcium pending today.    Plan:  1. Patient will proceed with Faslodex 500 mg IM injection today and continue every 28 days (today is cycle 15)  2. Continue [id number] mg twice daily  3. Proceed with 2 units PRBC transfusion later this week for hemoglobin of 7.3 with symptomatic anemia.  4. Continue oral folic acid 1 mg daily, B12 1000 mcg daily.  5. The patient is continuing with home dialysis device 5 days/week x 2 hours.  6. Patient is receiving Mircera under the direction of nephrology, dosing every 4 weeks (last received on 1/17/2023) as well as IV iron every 2 weeks.  7. Continue to monitor hemoglobin closely regarding ongoing transfusion needs for hemoglobin less than 7.0.  8. In 2 weeks CBC and RN review  9. In 4 weeks MD visit with CBC, CMP and Faslodex cycle 17.  We will plan 3-month interval PET scan.    Patient continues on high risk medication requiring intensive monitoring.

## 2023-02-17 ENCOUNTER — HOSPITAL ENCOUNTER (OUTPATIENT)
Dept: INFUSION THERAPY | Facility: HOSPITAL | Age: 63
Discharge: HOME | End: 2023-02-17
Payer: COMMERCIAL

## 2023-02-17 VITALS
OXYGEN SATURATION: 97 % | TEMPERATURE: 97.4 F | RESPIRATION RATE: 20 BRPM | DIASTOLIC BLOOD PRESSURE: 64 MMHG | HEART RATE: 68 BPM | SYSTOLIC BLOOD PRESSURE: 131 MMHG

## 2023-02-17 DIAGNOSIS — N18.30 ANEMIA IN STAGE 3 CHRONIC KIDNEY DISEASE, UNSPECIFIED WHETHER STAGE 3A OR 3B CKD: ICD-10-CM

## 2023-02-17 DIAGNOSIS — D63.1 ANEMIA DUE TO CHRONIC KIDNEY DISEASE, ON CHRONIC DIALYSIS: ICD-10-CM

## 2023-02-17 DIAGNOSIS — N18.6 ANEMIA DUE TO CHRONIC KIDNEY DISEASE, ON CHRONIC DIALYSIS: ICD-10-CM

## 2023-02-17 DIAGNOSIS — Z99.2 ANEMIA DUE TO CHRONIC KIDNEY DISEASE, ON CHRONIC DIALYSIS: ICD-10-CM

## 2023-02-17 DIAGNOSIS — D63.1 ANEMIA IN STAGE 3 CHRONIC KIDNEY DISEASE, UNSPECIFIED WHETHER STAGE 3A OR 3B CKD: ICD-10-CM

## 2023-02-17 DIAGNOSIS — Z79.899 HIGH RISK MEDICATION USE: ICD-10-CM

## 2023-02-17 PROCEDURE — P9016 RBC LEUKOCYTES REDUCED: HCPCS

## 2023-02-17 PROCEDURE — 86900 BLOOD TYPING SEROLOGIC ABO: CPT

## 2023-02-17 PROCEDURE — 63710000001 DIPHENHYDRAMINE PER 50 MG: Performed by: NURSE PRACTITIONER

## 2023-02-17 PROCEDURE — 36430 TRANSFUSION BLD/BLD COMPNT: CPT

## 2023-02-17 RX ORDER — ACETAMINOPHEN 325 MG/1
650 TABLET ORAL ONCE
Status: COMPLETED | OUTPATIENT
Start: 2023-02-17 | End: 2023-02-17

## 2023-02-17 RX ORDER — DIPHENHYDRAMINE HCL 25 MG
25 CAPSULE ORAL ONCE
Status: COMPLETED | OUTPATIENT
Start: 2023-02-17 | End: 2023-02-17

## 2023-02-17 RX ORDER — SODIUM CHLORIDE 9 MG/ML
250 INJECTION, SOLUTION INTRAVENOUS AS NEEDED
Status: DISCONTINUED | OUTPATIENT
Start: 2023-02-17 | End: 2023-02-19 | Stop reason: HOSPADM

## 2023-02-17 RX ADMIN — ACETAMINOPHEN 650 MG: 325 TABLET, FILM COATED ORAL at 08:52

## 2023-02-17 RX ADMIN — DIPHENHYDRAMINE HYDROCHLORIDE 25 MG: 25 CAPSULE ORAL at 08:52

## 2023-02-20 ENCOUNTER — TELEPHONE (OUTPATIENT)
Dept: ONCOLOGY | Facility: CLINIC | Age: 63
End: 2023-02-20
Payer: COMMERCIAL

## 2023-02-20 NOTE — TELEPHONE ENCOUNTER
Caller: Juana Hall    Relationship to patient: Self    Best call back number: [phone]    Type of visit: LAB AND RN REVIEW    Requested date: 03/02 MID MORNING    If rescheduling, when is the original appointment: 03/09

## 2023-02-22 RX ORDER — ABEMACICLIB 150 MG/1
TABLET ORAL
Qty: 56 TABLET | Refills: 5 | Status: SHIPPED | OUTPATIENT
Start: 2023-02-22

## 2023-02-22 NOTE — TELEPHONE ENCOUNTER
Refill requested from pharmacy. Per last chart note, patient to continue [id number] mg twice daily. Will route to MD for cosignature.    Wiley España, PharmD, BCOP  2/22/2023 14:35 EST

## 2023-03-03 ENCOUNTER — CLINICAL SUPPORT (OUTPATIENT)
Dept: ONCOLOGY | Facility: HOSPITAL | Age: 63
End: 2023-03-03
Payer: COMMERCIAL

## 2023-03-03 ENCOUNTER — LAB (OUTPATIENT)
Dept: LAB | Facility: HOSPITAL | Age: 63
End: 2023-03-03
Payer: COMMERCIAL

## 2023-03-03 DIAGNOSIS — C50.919 METASTATIC BREAST CANCER: ICD-10-CM

## 2023-03-03 LAB
BASOPHILS # BLD AUTO: 0.07 10*3/MM3 (ref 0–0.2)
BASOPHILS NFR BLD AUTO: 1.7 % (ref 0–1.5)
DEPRECATED RDW RBC AUTO: 64.4 FL (ref 37–54)
EOSINOPHIL # BLD AUTO: 0.29 10*3/MM3 (ref 0–0.4)
EOSINOPHIL NFR BLD AUTO: 7.1 % (ref 0.3–6.2)
ERYTHROCYTE [DISTWIDTH] IN BLOOD BY AUTOMATED COUNT: 18.1 % (ref 12.3–15.4)
HCT VFR BLD AUTO: 24.5 % (ref 34–46.6)
HGB BLD-MCNC: 8.1 G/DL (ref 12–15.9)
IMM GRANULOCYTES # BLD AUTO: 0.07 10*3/MM3 (ref 0–0.05)
IMM GRANULOCYTES NFR BLD AUTO: 1.7 % (ref 0–0.5)
LYMPHOCYTES # BLD AUTO: 1.12 10*3/MM3 (ref 0.7–3.1)
LYMPHOCYTES NFR BLD AUTO: 27.4 % (ref 19.6–45.3)
MCH RBC QN AUTO: 32.5 PG (ref 26.6–33)
MCHC RBC AUTO-ENTMCNC: 33.1 G/DL (ref 31.5–35.7)
MCV RBC AUTO: 98.4 FL (ref 79–97)
MONOCYTES # BLD AUTO: 0.36 10*3/MM3 (ref 0.1–0.9)
MONOCYTES NFR BLD AUTO: 8.8 % (ref 5–12)
NEUTROPHILS NFR BLD AUTO: 2.18 10*3/MM3 (ref 1.7–7)
NEUTROPHILS NFR BLD AUTO: 53.3 % (ref 42.7–76)
NRBC BLD AUTO-RTO: 0.5 /100 WBC (ref 0–0.2)
PLATELET # BLD AUTO: 105 10*3/MM3 (ref 140–450)
PMV BLD AUTO: 9.9 FL (ref 6–12)
RBC # BLD AUTO: 2.49 10*6/MM3 (ref 3.77–5.28)
WBC NRBC COR # BLD: 4.09 10*3/MM3 (ref 3.4–10.8)

## 2023-03-03 PROCEDURE — G0463 HOSPITAL OUTPT CLINIC VISIT: HCPCS

## 2023-03-03 PROCEDURE — 36415 COLL VENOUS BLD VENIPUNCTURE: CPT

## 2023-03-03 PROCEDURE — 85025 COMPLETE CBC W/AUTO DIFF WBC: CPT

## 2023-03-03 RX ORDER — FOLIC ACID 1 MG/1
TABLET ORAL
Qty: 30 TABLET | Refills: 2 | Status: SHIPPED | OUTPATIENT
Start: 2023-03-03

## 2023-03-03 NOTE — NURSING NOTE
Patient is here for lab review with RN.  CBC reviewed, counts are stable for this patient at this time. Patient has no complaints. She continues Verzenio w/ good tolerance. Copy of labs given to patient and follow up appointment reviewed. Patient is instructed to call the office with any concerns or new symptoms prior to next visit. Patient verbalized understanding and discharged in stable condition.    Lab Results   Component Value Date    WBC 4.09 03/03/2023    HGB 8.1 (L) 03/03/2023    HCT 24.5 (L) 03/03/2023    MCV 98.4 (H) 03/03/2023     (L) 03/03/2023

## 2023-03-15 NOTE — PROGRESS NOTES
"Chief Complaint  Metastatic lobular breast cancer (ER/IL positive, HER-2/tj negative), anemia secondary to CKD3, CHF, peripheral neuropathy, chemotherapy related nausea chemotherapy-induced myelosuppression    Subjective        History of Present Illness  The patient returns today in follow-up continuing on monthly Faslodex due for cycle 17 today and continuing on abemaciclib 150 mg twice daily.  Patient today returns reporting that she feels better than she has in quite some time.  She has been more active recently.  She continues in her motorized wheelchair today.  She continues on home dialysis 5 days/week.  She continues to receive Mircera through nephrology every 4 weeks and is due to receive this on 3/21/2023 next.  Her iron levels did improve and she is no longer currently receiving IV iron.  She did require transfusion 4 weeks ago with symptomatic hemoglobin down to 7.3 and she does report improvement in fatigue after the transfusion.  She does not feel that she requires further follow-up with supportive oncology at this time and would like for us to prescribe her Zoloft for now.  She is not having any significant side effects from Verzenio except for some very occasional diarrhea for which she takes Imodium once every few weeks.  Appetite is stable.  She does note that her blood pressure has increased and she has stopped taking midodrine on her own and is going to discuss this with nephrology next week.  She is no longer taking armodafinil.      Objective   Vital Signs:   /71   Pulse 72   Temp 96.9 °F (36.1 °C) (Temporal)   Resp 16   Ht 170.2 cm (67.01\")   Wt (!) 159 kg (350 lb) Comment: states  SpO2 94%   BMI 54.80 kg/m²     Physical Exam  Constitutional:       Appearance: She is well-developed. She is obese.      Comments: Patient is in a motorized scooter today   Eyes:      Conjunctiva/sclera: Conjunctivae normal.   Neck:      Thyroid: No thyromegaly.   Cardiovascular:      Rate and Rhythm: " Normal rate and regular rhythm.      Heart sounds: Murmur heard.     No friction rub. No gallop.      Comments: 2/6 murmur  Pulmonary:      Effort: No respiratory distress.      Breath sounds: Normal breath sounds.   Abdominal:      General: Bowel sounds are normal. There is no distension.      Palpations: Abdomen is soft.      Tenderness: There is no abdominal tenderness.   Musculoskeletal:         General: Swelling present.      Comments: 1+ bilateral lower extremity edema with venous stasis changes noted.  Left upper extremity fistula   Lymphadenopathy:      Head:      Right side of head: No submandibular adenopathy.      Cervical: No cervical adenopathy.      Upper Body:      Right upper body: No supraclavicular adenopathy.      Left upper body: No supraclavicular adenopathy.   Skin:     General: Skin is warm and dry.      Findings: No bruising or rash.   Neurological:      Mental Status: She is alert and oriented to person, place, and time.      Cranial Nerves: No cranial nerve deficit.      Motor: No abnormal muscle tone.      Deep Tendon Reflexes: Reflexes normal.   Psychiatric:         Behavior: Behavior normal.        Patient was examined today, unchanged from above    Result Review : Reviewed CBC, CMP from today.       Assessment and Plan    *Metastatic lobular breast cancer (ER/MO positive, HER-2/tj negative):  · Previous stage IIIC right breast cancer in 2003, received neoadjuvant chemotherapy (unknown regimen) with subsequent bilateral mastectomies 1/22/2004 with right axillary dissection.  Residual 10-12 cm invasive lobular carcinoma on the right breast with 14/16+ nodes with extranodal extension.  Received adjuvant carboplatin and Navelbine chemotherapy x4 cycles  · Attempted adjuvant radiation to the chest wall however interrupted due to cellulitis that required removal of implants in August 2004  · Received tamoxifen from 6/22/2004 until June 2009.  Transitioned to Femara and received until June  2014  · Identification of CT findings concerning for carcinomatosis on CT scans and June 2019.  · PET scan confirmed hypermetabolic activity in the omentum as well as small retroperitoneal lymph nodes.  · CT-guided omental biopsy 7/30/2019 with metastatic lobular carcinoma of breast primary, ER positive (greater than 95%), SC positive (90%), HER-2/tj negative (1+ IHC)  · [facility] liquid biopsy 2/5/2020: MSI undetermined, mutations in BETH, NF1, CHEK2.  No evidence of PIK3CA mutation.  · Subsequent [facility] germline testing 11/12/2021 with BETH VUS (c.2864C>A) and POLE VUS (c.631A>T)  · Initiation of Aromasin and Ibrance in August 2019  · Difficulty with myelosuppression related to Ibrance requiring dose alterations, eventually changed to 100 mg for 7 days on followed by 7 days off.  · CT chest abdomen pelvis 10/26/2021 with increase in left hydronephrosis with increase in left perinephric and periureteral stranding. Decrease in stone in the left kidney lower pole (7 mm).  Stable small retroperitoneal lymph and left periaortic lymph nodes.  · PET scan 11/10/2021 with multiple bilateral hypermetabolic nodular pulmonary lesions, asymmetric moderate to severe left hydronephrosis with ill-defined fat stranding and soft tissue thickening in the renal sinus and surrounding the left ureter, hypermetabolic left retroperitoneal lymph node with slight increase in size, ill-defined hypermetabolic thickening in the inferior omentum with increase in size, uptake and C7 (indeterminate, recommended MRI).  Short segments of uptake in the mid and distal esophagus possibly related to gastritis.  · PET scan findings felt to be consistent with progressive malignancy.  Patient declined repeat omental biopsy.   · Options for further treatment discussed on 11/12/2021.  In light of renal failure and dialysis, options are more limited.  Recommendation to continue Ibrance and discontinue Aromasin, begin Faslodex.  Discussed possible  future use of single agent Taxol.    · Aromasin discontinued 11/12/2021  · On 11/22/2021 initiation of Faslodex with continuation of Ibrance (100 mg daily for 7 days on followed by 7 days off).  · MRI cervical spine 11/18/2021 showed the right C7 normality to likely represent metastatic disease.  With solitary bone lesion, did not recommend pursuit of bone modifying agent.  · Following 2 cycles Faslodex/Ibrance, PET scan on 1/11/2022 showed no change in the soft tissue superior to the dome of the bladder with hypermetabolic activity (likely related to carcinomatosis).  Significant decrease in injection of fat around the left renal pelvis and stable retroperitoneal hypermetabolic lymph nodes, decrease in size and activity in small bilateral pulmonary nodules.  Findings felt to represent evidence of response to treatment.    · Ibrance held briefly beginning 1/28/2022 due to hospitalization with COVID-19 infection and C. difficile colitis.  Patient developed leukopenia/neutropenia.  Ibrance resumed at previous dosing (100 mg daily 7 days on followed by 7 days off) on 2/9/22.  · Following 5 cycles Faslodex/Ibrance PET scan 4/19/2022 with evidence of mixed response.  New hypermetabolic lesion spinous process L2 (SUV 5.1) and slight increase in activity left clavicular metastasis (SUV increased 4.6-8.1).  C7 hypermetabolic mixed lytic and blastic bone lesion was unchanged.  Decrease in uptake involving omental thickening.  Stable soft tissue thickening around the left renal pelvis and ureter.  Discussion again regarding potential pursuit of IV chemotherapy with Taxol versus continuation of Ibrance and Faslodex with radiation to sites of bony disease at L2, left clavicle, C7.  Patient opted to defer initiation of systemic chemotherapy and continue Ibrance/Faslodex with consideration of radiation.  · Initiated monthly Xgeva on 5/19/2022 (cleared with nephrology).  Xgeva subsequently held due to significant hypocalcemia.   Decision made to forego further Xgeva with persistent hypocalcemia.  · Palliative radiation received to lumbar spine regarding L2 metastasis 5/23- 6/7/2022  · Ibrance held during radiation therapy beginning 5/22/2022.  Ibrance resumed 6/16/2022.  · PET scan 7/21/2022 with stable hypermetabolic abdominal pelvic lymph nodes and subcarinal lymph node, stable bone lesions at C7, left clavicle.  Stable soft tissue thickening around the left renal pelvis with left hydronephrosis.  Uptake in adrenal glands felt to be likely reactive (no change in size).  No activity noted at L2 (previously radiated).  New hypermetabolic lesion right anterior sixth rib.  · With evidence of further slight progression in bone on PET scan (new right sixth rib lesion), on 7/28/2022 discontinued Ibrance and plan to transition to abemaciclib with continuation of Faslodex.    · On 8/4/2022 initiated abemaciclib at reduced dose of 50 mg twice daily.  · On 8/25/2022, increased abemaciclib dose to 100 mg twice daily  · PET scan 10/6/2022 with stable findings with exception of left acetabular activity report noted new activity however on prior PET scan question of focal activity present previously.  No corresponding CT abnormality and significant increase in SUV at sacral lesion.  Scan otherwise stable.  Decision to continue current treatment  · Abemaciclib dose increased on 10/13/2022 up to 150 mg twice daily  · PET scan 12/29/2022 with artifactual accumulation of tracer right axilla and right mastectomy bed without visualized CT abnormality.  Hypermetabolic bone lesions with decrease in level of activity.  No new findings.  · Patient returns today in follow-up due for cycle 17 Faslodex 500 mg IM every 4 weeks and continuing on abemaciclib 150 mg twice daily.  Patient is tolerating treatment extremely well.  She has some very occasional diarrhea which may be attributable to the medication however requires a dose of Imodium only every few weeks.   Unfortunately, she is experiencing some cumulative hematologic toxicity from treatment with worsening of her anemia, required transfusion support a month ago with hemoglobin symptomatic at 7.3.  Hemoglobin today is acceptable at 8.4.  Her WBC has gradually declined down to 2.33 with ANC 1.19 and platelet count borderline at 102,000.  Patient would like to try to maintain the current dose of abemaciclib.  We will check her CBC in 1 week and again in 2 weeks.  If counts decline any further we will need to hold abemaciclib until counts recover and then resume at reduced dose (possibly 150 mg a.m., 100 mg p.m).  We will plan repeat PET scan in 3 weeks at the end of the cycle and I will see her in 4 weeks for follow-up when she is due for cycle 18 and we will review scan results.    *Anemia secondary to ESRD, underlying malignancy/chronic disease, myelosuppression from CDK 4/6 inhibitor, GI blood loss:  · Anemia secondary to ESRD, malignancy with exacerbation by use of Ibrance  · Previous evidence of folate deficiency 8/12/2019 with level 3.84, initiated folic acid 1 mg daily  · Previous evidence of iron deficiency, received Injectafer on 8/7/2020 750 mg IV x1 dose.  Second dose not administered due to onset of fever, subsequent iron studies precluded further administration of IV iron.  · Previous low normal B12 level initiated oral B12 1000 mcg daily.  · Patient had previously received Procrit and required intermittent transfusion support  · Following initiation of hemodialysis, management of BEAU transitioned to nephrology, receiving Epogen with dialysis.  · Ongoing transfusion support prompted alteration in Ibrance dosing on 8/23/2021.  · After alteration in Ibrance dosing, hemoglobin improved and remained stable in the 9-10 range.  · Improved but ongoing intermittent need for transfusion support despite Ibrance dose alteration  · Stool negative for occult blood x3 on 11/2/2021  · Patient with reduced dialysis  frequency to 2 days/week with concurrent decrease in Epogen dosing corresponding again to increased transfusion requirement.  · Epogen dose increased per nephrology to 20,000 units twice weekly with dialysis on Mondays and Fridays  · Ibrance again exacerbating anemia with ongoing intermittent transfusion requirements.  Ibrance subsequently discontinued on 7/28/2022 and patient initiated abemaciclib on 8/4/2022 which may be less myelosuppressive.  · Additional transfusions required with hemoglobin on 3/31/2022 6.6, hemoglobin 4/21/2022 of 6.4, hemoglobin on 5/5/2022 of 6.8, hemoglobin 6.4 on 7/14/2022, hemoglobin 6.4 on 7/28/2022, hemoglobin 6.8 on 8/18/2022.  · Patient did develop transient visible blood in stool in July 2022  · Anemia evaluation 7/28/2022 with iron 32, ferritin 412, iron saturation 15%, TIBC 210, folate 19.9, B12 925, haptoglobin 300, .  · Stool positive for occult blood x3 on 8/1/2022  · Patient was seen by GI on 8/16/2022, felt to be high risk for endoscopic evaluation and elected to forego EGD/colonoscopy for now  · Patient transitioned from hemodialysis in clinic to home hemodialysis 5 days/week x 2 hours beginning 8/29/2022.  With transition to home dialysis, nephrology transition the patient from Epogen to Mircera administered every 4 weeks in their office, initiated 8/22/2022.  · Symptomatic hemoglobin 7.3 on 2/16/2023, received transfusion  · Patient returns today with hemoglobin of 8.4.  She continues on Mircera every 4 weeks with nephrology.  She was receiving IV iron as well however none recently with improvement in level.  No current need for transfusion.  We will check B12 and folate with her concurrent leukopenia and thrombocytopenia.  Recheck hemoglobin in 1 week, 2 weeks, 4 weeks.    *ESRD on HD:  · Patient with previous CKD3/4  · Hospitalization 4/29-5/4/2021 with RYAN/CKD, UTI, anemia.  Required initiation of hemodialysis Tuesday Thursday Saturday.   · Decrease in frequency  of dialysis to twice per week.  She continues to produce a significant amount of urine.   · Status post left upper extremity AV fistula placement on 11/3/2021 by vascular surgery  · Attempt to hold further dialysis on 1/11/2022 with close monitoring of her laboratory and clinical parameters.  Dialysis quickly resumed due to development of hyperkalemia.  · Patient was undergoing dialysis 2 days/week on Mondays and Fridays.    · On 8/22/2022 patient transitioned to use of home dialysis device 5 days/week x 2 hours.    *CHF with severe aortic stenosis:  · Echocardiogram 7/12/2019 showing ejection fraction 48% with moderate dilation of the LV cavity, global hypokinesis, grade 2 diastolic dysfunction, mild to moderate aortic stenosis, trivial pericardial effusion.  · Echocardiogram 7/19/2021 with ejection fraction 56%, left atrial volume moderately increased, grade 2 diastolic dysfunction, severe calcification aortic valve, moderate aortic stenosis, severe mitral calcification, mild mitral stenosis, marked elevation in RVSP from tricuspid regurgitation.  · Echocardiogram on 7/13/2022 with ejection fraction 56-60%, grade 2 diastolic dysfunction, severe aortic stenosis.    · Cardiac catheterization 8/23/2022 showed normal coronary arteries, mild to moderate pulmonary hypertension.    · She was evaluated by cardiothoracic surgery Dr. Pagan and there was discussion regarding potential candidacy for TAVR although there was concern given her underlying comorbidities including her metastatic breast cancer.  I discussed patient's prognosis with Dr. Pagan.  She does have opportunities for additional treatment of her malignancy with intravenous chemotherapy and is willing to pursue this in the future when needed.  It is unclear as to her expected survival however given her multiple severe comorbidities with metastatic breast cancer, ESRD on dialysis, severe aortic stenosis, severe anemia.  There is consideration of possible pursuit  of balloon valvuloplasty initially to assess her symptomatic response and then consider pursuit of TAVR in the future.   · Patient did undergo aortic valvuloplasty during admission 1/10 - 1/11/2023.    *Left upper and bilateral lower extremity peripheral neuropathy:  · Patient reports that left upper extremity neuropathy was not present from previous chemotherapy but occurred after hospitalization that required intubation and critical care stay.  Apparently felt to represent critical care neuropathy.  Improved over time.  · Bilateral lower extremity neuropathy felt to be related to diabetes  · May have future implications regarding treatment for breast cancer.  · Patient reports that peripheral neuropathy symptoms continue in the bilateral lower extremities, unchanged.  This will be a consideration with potential future use of Taxol    *Uterine/endometrial activity on PET scan:  · Patient experienced postmenopausal vaginal bleeding in 2013, negative endometrial biopsy and gynecologic evaluation.  · With findings on PET scan with enlarging uterus and some hypermetabolic activity, referred back to Dr. Oliveros in gynecology.    · 9/19/2019 D&C with hysteroscopy by Dr. Oliveros, no significant intraoperative findings, pathologic results with benign findings, no evidence of malignancy.    *Nausea:  · Mild, relieved with Zofran.   · Patient experienced improvement in nausea after initiating hemodialysis  · No recent nausea    *Myelosuppression secondary to CDK 4/6 inhibitor:  · Patient was able to maintain adequate WBC after dosing alteration on Ibrance to treatment at 100 mg daily for 7 days on followed by 7 days off.  · Subsequent transition from Ibrance to abemaciclib  · Today, WBC has declined further to 2.33 with ANC 1.19.  This is secondary to ongoing treatment with abemaciclib.  We did discuss the borderline nature of her ANC.  She would like to continue on current dose of abemaciclib if possible.  We will monitor her  counts in 1 week, 2 weeks, 4 weeks.  If there is further decline as noted above we will need to hold abemaciclib and consider above dose reduction when counts improved.    *Depression  · Patient with history of depression, worsened after the death of her son in November 2021  · With evidence of progressive malignancy in November 2021, patient agreeable to referral to supportive oncology  · Patient currently receiving gabapentin 300 mg nightly, Zoloft 150 mg daily  · Patient does continue with difficulties regarding depression that wax and wane.  Currently symptoms under fair control.  Patient has been continuing follow-up with supportive oncology.  She does not feel that she needs further follow-up at this time however would not rule out returning to see supportive oncology in the future if needed.  We will plan to prescribe her Zoloft in the interval.  She remains off of armodafinil.    *Left perinephric stranding secondary to malignancy with hydronephrosis:  · CT 4/22/2021 showed interval enlargement of 2 staghorn calculi in the left kidney with persistent left perinephric stranding  · Hospitalization 4/29-5/4/2021 with RYAN/CKD, UTI, anemia.  Required 2 unit PRBC transfusion and initiated hemodialysis Tuesday Thursday Saturday.    · Discussion from urology regarding potential need for surgical procedure to address staghorn calculus left kidney  · CT scan 7/2/2021 with only 1 remaining left renal stone identified which had decreased in size.  Patient appears to have passed the other stone.  · As above, CT 10/26/2021 with more prominent left hydronephrosis and increase in left perinephric and periureteral stranding.  Felt to possibly be related to passage of recent stone versus partial obstruction secondary to debris or thrombus in the left ureter versus pyelonephritis.  Patient however with no clinical evidence of recent stone passage nor pyelonephritis. Malignancy felt to be the cause of CT changes around the left  kidney at this point.   · Left ureteral stent replacement 12/16/2021  · PET scan 1/11/2022 with decrease in fat injection near left renal pelvis, stent in satisfactory position.  Mild residual hydronephrosis on the left.  · Ureteral stent replaced on 3/10/2022  · PET scan 4/19/2022 with no hydronephrosis, left ureteral stent in place  · PET scan 7/21/2022 with persistent left hydronephrosis, ureteral stent in place  · PET scan 10/6/2022 with left nephroureteral stent in place, overall stable findings  · PET scan 12/29/2022 with left nephroureteral stent remaining in place, stable findings    *Right thyroid nodules  · Patient underwent prior thyroid biopsy by her report with benign findings many years ago, records not available  · On MRI cervical spine 11/18/2021, finding of 1.8 cm right thyroid nodule  · Thyroid ultrasound 11/26/2021 with right-sided thyroid nodules, 1 nodule was hypoechoic, solid measuring 2 cm with biopsy recommended.  · Thyroid ultrasound results reviewed with patient.  In light of her metastatic breast cancer, renal failure the significance of the thyroid nodule is felt to be much less.  We discussed possibility of thyroid biopsy however patient is inclined to forego this, especially since she has had a biopsy performed in the past with benign findings by her report.    · No hypermetabolic activity identified on PET scan 1/11/2022 in the neck    *Hospitalization 1/28-1/30/2022 due to COVID-19 infection and C. difficile colitis  · Patient fully vaccinated with 3 doses Moderna COVID-19 vaccine  · Patient developed symptoms 1/26/2022 with abrupt onset sinus congestion, mild cough, subsequently developed nausea and diarrhea on 1/27/2022.  Significant fatigue.  · Patient tested positive in emergency department on 1/28/2022 and was admitted  · Patient maintained O2 saturation in mid 90% range on room air  · Patient received remdesivir  · Patient was also found to be positive for C. difficile colitis,  received course of oral vancomycin.  · Symptoms from both COVID-19 and C. difficile colitis ultimately resolved.    *Hypocalcemia  · Patient developed significant hypocalcemia after receiving Xgeva on 5/19/2022  · Calcium management per nephrology  · No further Xgeva planned due to persistent significant hypocalcemia  · Calcium today 9.5.    Plan:  1. Patient will proceed with Faslodex 500 mg IM injection today and continue every 28 days (today is cycle 17)  2. Continue [id number] mg twice daily  3. Continue oral folic acid 1 mg daily, B12 1000 mcg daily.  4. We will add CA 15-3, B12, folate to today's labs.  5. The patient is continuing with home dialysis device 5 days/week x 2 hours.  6. Patient is receiving Mircera under the direction of nephrology, dosing every 4 weeks (due next on 3/21/2023) as well as IV iron every 2 weeks as needed (none recently).  7. Continue to monitor hemoglobin closely regarding ongoing transfusion needs for hemoglobin less than 7.0.  8. In 1 week and again in 2 weeks CBC and RN review.  If the patient has further decline in ANC or platelet count we will need to consider holding abemaciclib and reducing dose.  9. In 2 weeks PET scan  10. In 3 weeks MD visit with CBC, CMP and cycle 18 Faslodex pending PET scan result.    Patient continues on high risk medication requiring intensive monitoring.    I did spend 40 minutes in total time caring for patient today, time spent in review of records, face-to-face consultation, coordination of care, placement of orders, completion of documentation.

## 2023-03-16 ENCOUNTER — INFUSION (OUTPATIENT)
Dept: ONCOLOGY | Facility: HOSPITAL | Age: 63
End: 2023-03-16
Payer: COMMERCIAL

## 2023-03-16 ENCOUNTER — OFFICE VISIT (OUTPATIENT)
Dept: ONCOLOGY | Facility: CLINIC | Age: 63
End: 2023-03-16
Payer: COMMERCIAL

## 2023-03-16 ENCOUNTER — LAB (OUTPATIENT)
Dept: LAB | Facility: HOSPITAL | Age: 63
End: 2023-03-16
Payer: COMMERCIAL

## 2023-03-16 VITALS
HEIGHT: 67 IN | BODY MASS INDEX: 45.99 KG/M2 | SYSTOLIC BLOOD PRESSURE: 144 MMHG | WEIGHT: 293 LBS | OXYGEN SATURATION: 94 % | RESPIRATION RATE: 16 BRPM | TEMPERATURE: 96.9 F | DIASTOLIC BLOOD PRESSURE: 71 MMHG | HEART RATE: 72 BPM

## 2023-03-16 DIAGNOSIS — C50.919 METASTATIC BREAST CANCER: Primary | ICD-10-CM

## 2023-03-16 DIAGNOSIS — C50.919 METASTATIC BREAST CANCER: ICD-10-CM

## 2023-03-16 LAB
ALBUMIN SERPL-MCNC: 3.1 G/DL (ref 3.5–5.2)
ALBUMIN/GLOB SERPL: 0.6 G/DL (ref 1.1–2.4)
ALP SERPL-CCNC: 92 U/L (ref 38–116)
ALT SERPL W P-5'-P-CCNC: 10 U/L (ref 0–33)
ANION GAP SERPL CALCULATED.3IONS-SCNC: 9.9 MMOL/L (ref 5–15)
AST SERPL-CCNC: 17 U/L (ref 0–32)
BASOPHILS # BLD AUTO: 0.04 10*3/MM3 (ref 0–0.2)
BASOPHILS NFR BLD AUTO: 1.7 % (ref 0–1.5)
BILIRUB SERPL-MCNC: 0.5 MG/DL (ref 0.2–1.2)
BUN SERPL-MCNC: 22 MG/DL (ref 6–20)
BUN/CREAT SERPL: 5.6 (ref 7.3–30)
CALCIUM SPEC-SCNC: 9.5 MG/DL (ref 8.5–10.2)
CANCER AG15-3 SERPL-ACNC: 27 U/ML
CHLORIDE SERPL-SCNC: 99 MMOL/L (ref 98–107)
CO2 SERPL-SCNC: 28.1 MMOL/L (ref 22–29)
CREAT SERPL-MCNC: 3.91 MG/DL (ref 0.6–1.1)
DEPRECATED RDW RBC AUTO: 78.8 FL (ref 37–54)
EGFRCR SERPLBLD CKD-EPI 2021: 12.3 ML/MIN/1.73
EOSINOPHIL # BLD AUTO: 0.27 10*3/MM3 (ref 0–0.4)
EOSINOPHIL NFR BLD AUTO: 11.6 % (ref 0.3–6.2)
ERYTHROCYTE [DISTWIDTH] IN BLOOD BY AUTOMATED COUNT: 19.6 % (ref 12.3–15.4)
FOLATE SERPL-MCNC: >20 NG/ML (ref 4.78–24.2)
GLOBULIN UR ELPH-MCNC: 4.9 GM/DL (ref 1.8–3.5)
GLUCOSE SERPL-MCNC: 115 MG/DL (ref 74–124)
HCT VFR BLD AUTO: 27.4 % (ref 34–46.6)
HGB BLD-MCNC: 8.4 G/DL (ref 12–15.9)
IMM GRANULOCYTES # BLD AUTO: 0.02 10*3/MM3 (ref 0–0.05)
IMM GRANULOCYTES NFR BLD AUTO: 0.9 % (ref 0–0.5)
LYMPHOCYTES # BLD AUTO: 0.57 10*3/MM3 (ref 0.7–3.1)
LYMPHOCYTES NFR BLD AUTO: 24.5 % (ref 19.6–45.3)
MCH RBC QN AUTO: 33.5 PG (ref 26.6–33)
MCHC RBC AUTO-ENTMCNC: 30.7 G/DL (ref 31.5–35.7)
MCV RBC AUTO: 109.2 FL (ref 79–97)
MONOCYTES # BLD AUTO: 0.24 10*3/MM3 (ref 0.1–0.9)
MONOCYTES NFR BLD AUTO: 10.3 % (ref 5–12)
NEUTROPHILS NFR BLD AUTO: 1.19 10*3/MM3 (ref 1.7–7)
NEUTROPHILS NFR BLD AUTO: 51 % (ref 42.7–76)
NRBC BLD AUTO-RTO: 0 /100 WBC (ref 0–0.2)
PLATELET # BLD AUTO: 102 10*3/MM3 (ref 140–450)
PMV BLD AUTO: 9.5 FL (ref 6–12)
POTASSIUM SERPL-SCNC: 4 MMOL/L (ref 3.5–4.7)
PROT SERPL-MCNC: 8 G/DL (ref 6.3–8)
RBC # BLD AUTO: 2.51 10*6/MM3 (ref 3.77–5.28)
SODIUM SERPL-SCNC: 137 MMOL/L (ref 134–145)
VIT B12 BLD-MCNC: 1146 PG/ML (ref 211–946)
WBC NRBC COR # BLD: 2.33 10*3/MM3 (ref 3.4–10.8)

## 2023-03-16 PROCEDURE — 85025 COMPLETE CBC W/AUTO DIFF WBC: CPT

## 2023-03-16 PROCEDURE — 82607 VITAMIN B-12: CPT | Performed by: INTERNAL MEDICINE

## 2023-03-16 PROCEDURE — 25010000002 FULVESTRANT PER 25 MG: Performed by: INTERNAL MEDICINE

## 2023-03-16 PROCEDURE — 96402 CHEMO HORMON ANTINEOPL SQ/IM: CPT

## 2023-03-16 PROCEDURE — 80053 COMPREHEN METABOLIC PANEL: CPT

## 2023-03-16 PROCEDURE — 36415 COLL VENOUS BLD VENIPUNCTURE: CPT

## 2023-03-16 PROCEDURE — 82746 ASSAY OF FOLIC ACID SERUM: CPT | Performed by: INTERNAL MEDICINE

## 2023-03-16 PROCEDURE — 99215 OFFICE O/P EST HI 40 MIN: CPT | Performed by: INTERNAL MEDICINE

## 2023-03-16 PROCEDURE — 86300 IMMUNOASSAY TUMOR CA 15-3: CPT | Performed by: INTERNAL MEDICINE

## 2023-03-16 RX ORDER — LAMOTRIGINE 25 MG/1
500 TABLET ORAL ONCE
Status: COMPLETED | OUTPATIENT
Start: 2023-03-16 | End: 2023-03-16

## 2023-03-16 RX ORDER — LAMOTRIGINE 25 MG/1
500 TABLET ORAL ONCE
Status: CANCELLED
Start: 2023-03-16 | End: 2023-03-16

## 2023-03-16 RX ADMIN — FULVESTRANT 500 MG: 250 INJECTION, SOLUTION INTRAMUSCULAR at 09:49

## 2023-03-21 ENCOUNTER — TELEPHONE (OUTPATIENT)
Dept: FAMILY MEDICINE CLINIC | Facility: CLINIC | Age: 63
End: 2023-03-21

## 2023-03-21 ENCOUNTER — OFFICE VISIT (OUTPATIENT)
Dept: FAMILY MEDICINE CLINIC | Facility: CLINIC | Age: 63
End: 2023-03-21
Payer: COMMERCIAL

## 2023-03-21 VITALS
WEIGHT: 293 LBS | HEART RATE: 77 BPM | RESPIRATION RATE: 16 BRPM | DIASTOLIC BLOOD PRESSURE: 60 MMHG | OXYGEN SATURATION: 97 % | SYSTOLIC BLOOD PRESSURE: 120 MMHG | TEMPERATURE: 97.5 F | BODY MASS INDEX: 45.99 KG/M2 | HEIGHT: 67 IN

## 2023-03-21 DIAGNOSIS — Z79.4 CONTROLLED TYPE 2 DIABETES MELLITUS WITHOUT COMPLICATION, WITH LONG-TERM CURRENT USE OF INSULIN: Primary | ICD-10-CM

## 2023-03-21 DIAGNOSIS — E78.2 MIXED HYPERLIPIDEMIA: ICD-10-CM

## 2023-03-21 DIAGNOSIS — E11.9 CONTROLLED TYPE 2 DIABETES MELLITUS WITHOUT COMPLICATION, WITH LONG-TERM CURRENT USE OF INSULIN: ICD-10-CM

## 2023-03-21 DIAGNOSIS — E11.9 CONTROLLED TYPE 2 DIABETES MELLITUS WITHOUT COMPLICATION, WITH LONG-TERM CURRENT USE OF INSULIN: Primary | ICD-10-CM

## 2023-03-21 DIAGNOSIS — Z79.4 CONTROLLED TYPE 2 DIABETES MELLITUS WITHOUT COMPLICATION, WITH LONG-TERM CURRENT USE OF INSULIN: ICD-10-CM

## 2023-03-21 DIAGNOSIS — E03.9 ACQUIRED HYPOTHYROIDISM: ICD-10-CM

## 2023-03-21 RX ORDER — LEVOTHYROXINE SODIUM 0.05 MG/1
50 TABLET ORAL DAILY
Qty: 90 TABLET | Refills: 1 | Status: SHIPPED | OUTPATIENT
Start: 2023-03-21

## 2023-03-21 RX ORDER — ATORVASTATIN CALCIUM 40 MG/1
40 TABLET, FILM COATED ORAL NIGHTLY
Qty: 90 TABLET | Refills: 1 | Status: SHIPPED | OUTPATIENT
Start: 2023-03-21

## 2023-03-21 RX ORDER — INSULIN DETEMIR 100 [IU]/ML
50 INJECTION, SOLUTION SUBCUTANEOUS DAILY
Qty: 45 ML | Refills: 1 | Status: SHIPPED | OUTPATIENT
Start: 2023-03-21 | End: 2023-03-28 | Stop reason: SDUPTHER

## 2023-03-21 NOTE — PROGRESS NOTES
"Subjective   Juana Hall is a 62 y.o. female.     History of Present Illness   Answers for HPI/ROS submitted by the patient on 3/16/2023  Please describe your symptoms.: Need med refill  Have you had these symptoms before?: Yes  How long have you been having these symptoms?: Greater than 2 weeks  Please list any medications you are currently taking for this condition.: Levimir  What is the primary reason for your visit?: Other     Since the last visit, she has overall felt well.  She has Primary Hypertension and well controlled on current medication, DMII well controlled on medication and will continue regimen, Hyperlipidemia with goals met with current Rx, CAD and remains under care of their Cardiologist for mangement and Hypothyroidism and must update labs to continue treatment.  she has been compliant with current medications have reviewed them.  The patient denies medication side effects.  Will refill medications. /60   Pulse 77   Temp 97.5 °F (36.4 °C)   Resp 16   Ht 170.2 cm (67.01\")   Wt (!) 165 kg (363 lb 12.1 oz)   LMP  (LMP Unknown)   SpO2 97%   BMI 56.96 kg/m²     Results for orders placed or performed in visit on 03/16/23   Cancer Antigen 15-3    Specimen: Blood   Result Value Ref Range    CA 15-3 27.0 (H) <=25.0 U/mL   Vitamin B12    Specimen: Blood   Result Value Ref Range    Vitamin B-12 1,146 (H) 211 - 946 pg/mL   Folate    Specimen: Blood   Result Value Ref Range    Folate >20.00 4.78 - 24.20 ng/mL     *Note: Due to a large number of results and/or encounters for the requested time period, some results have not been displayed. A complete set of results can be found in Results Review.         The following portions of the patient's history were reviewed and updated as appropriate: allergies, current medications, past family history, past medical history, past social history, past surgical history and problem list.    Review of Systems   Constitutional: Negative for unexpected weight " change.   Cardiovascular: Negative for chest pain and palpitations.   Endocrine: Negative for cold intolerance, heat intolerance, polydipsia, polyphagia and polyuria.   Psychiatric/Behavioral: Negative for behavioral problems.       Objective   Physical Exam  Vitals and nursing note reviewed.   Constitutional:       Appearance: She is well-developed.   Neck:      Vascular: No carotid bruit.   Cardiovascular:      Rate and Rhythm: Normal rate and regular rhythm.      Heart sounds: Murmur heard.   Pulmonary:      Effort: Pulmonary effort is normal.      Breath sounds: Normal breath sounds.   Neurological:      Mental Status: She is alert and oriented to person, place, and time.   Psychiatric:         Mood and Affect: Mood normal.         Behavior: Behavior normal.         Thought Content: Thought content normal.         Judgment: Judgment normal.         Assessment & Plan   Diagnoses and all orders for this visit:    1. Controlled type 2 diabetes mellitus without complication, with long-term current use of insulin (HCC) (Primary)  -     Levemir FlexTouch 100 UNIT/ML injection; Inject 50 Units under the skin into the appropriate area as directed Daily.  Dispense: 45 mL; Refill: 1    2. Mixed hyperlipidemia  -     atorvastatin (LIPITOR) 40 MG tablet; Take 1 tablet by mouth Every Night.  Dispense: 90 tablet; Refill: 1    3. Acquired hypothyroidism  -     levothyroxine (SYNTHROID, LEVOTHROID) 50 MCG tablet; Take 1 tablet by mouth Daily.  Dispense: 90 tablet; Refill: 1  -     TSH  -     T4, free  -     T3, free

## 2023-03-21 NOTE — TELEPHONE ENCOUNTER
"[facility] called about this pt medication     Levemir FlexTouch 100 UNIT      They no longer make Flex Touch they only make Flex Pen. Pharmacist can;t change it because it has \"do not sub.\" So it cant be change.Would like for someone to call  "

## 2023-03-24 ENCOUNTER — LAB (OUTPATIENT)
Dept: LAB | Facility: HOSPITAL | Age: 63
End: 2023-03-24
Payer: COMMERCIAL

## 2023-03-24 ENCOUNTER — CLINICAL SUPPORT (OUTPATIENT)
Dept: ONCOLOGY | Facility: HOSPITAL | Age: 63
End: 2023-03-24
Payer: COMMERCIAL

## 2023-03-24 DIAGNOSIS — C50.919 METASTATIC BREAST CANCER: Primary | ICD-10-CM

## 2023-03-24 DIAGNOSIS — C50.919 METASTATIC BREAST CANCER: ICD-10-CM

## 2023-03-24 LAB
BASOPHILS # BLD AUTO: 0.07 10*3/MM3 (ref 0–0.2)
BASOPHILS NFR BLD AUTO: 2.1 % (ref 0–1.5)
DEPRECATED RDW RBC AUTO: 70.6 FL (ref 37–54)
EOSINOPHIL # BLD AUTO: 0.3 10*3/MM3 (ref 0–0.4)
EOSINOPHIL NFR BLD AUTO: 9.1 % (ref 0.3–6.2)
ERYTHROCYTE [DISTWIDTH] IN BLOOD BY AUTOMATED COUNT: 18.4 % (ref 12.3–15.4)
HCT VFR BLD AUTO: 25.3 % (ref 34–46.6)
HGB BLD-MCNC: 7.9 G/DL (ref 12–15.9)
IMM GRANULOCYTES # BLD AUTO: 0.03 10*3/MM3 (ref 0–0.05)
IMM GRANULOCYTES NFR BLD AUTO: 0.9 % (ref 0–0.5)
LYMPHOCYTES # BLD AUTO: 0.8 10*3/MM3 (ref 0.7–3.1)
LYMPHOCYTES NFR BLD AUTO: 24.2 % (ref 19.6–45.3)
MCH RBC QN AUTO: 33.3 PG (ref 26.6–33)
MCHC RBC AUTO-ENTMCNC: 31.2 G/DL (ref 31.5–35.7)
MCV RBC AUTO: 106.8 FL (ref 79–97)
MONOCYTES # BLD AUTO: 0.31 10*3/MM3 (ref 0.1–0.9)
MONOCYTES NFR BLD AUTO: 9.4 % (ref 5–12)
NEUTROPHILS NFR BLD AUTO: 1.8 10*3/MM3 (ref 1.7–7)
NEUTROPHILS NFR BLD AUTO: 54.3 % (ref 42.7–76)
NRBC BLD AUTO-RTO: 0 /100 WBC (ref 0–0.2)
PLATELET # BLD AUTO: 109 10*3/MM3 (ref 140–450)
PMV BLD AUTO: 10.2 FL (ref 6–12)
RBC # BLD AUTO: 2.37 10*6/MM3 (ref 3.77–5.28)
WBC NRBC COR # BLD: 3.31 10*3/MM3 (ref 3.4–10.8)

## 2023-03-24 PROCEDURE — 85025 COMPLETE CBC W/AUTO DIFF WBC: CPT

## 2023-03-24 PROCEDURE — G0463 HOSPITAL OUTPT CLINIC VISIT: HCPCS

## 2023-03-24 PROCEDURE — 36415 COLL VENOUS BLD VENIPUNCTURE: CPT

## 2023-03-24 NOTE — NURSING NOTE
Pt here for RN review, no complaints or concerns today.  Hgb 7.9 today, but pt reports she is asymptomatic and does not wish to pursue blood transfusion at this time.  WBC and platelets both showing slight improvement and this was the major concern for pt today.  Pt asked to call with any concerns.  Pt v/u.

## 2023-03-27 DIAGNOSIS — E11.9 CONTROLLED TYPE 2 DIABETES MELLITUS WITHOUT COMPLICATION, WITH LONG-TERM CURRENT USE OF INSULIN: ICD-10-CM

## 2023-03-27 DIAGNOSIS — Z79.4 CONTROLLED TYPE 2 DIABETES MELLITUS WITHOUT COMPLICATION, WITH LONG-TERM CURRENT USE OF INSULIN: ICD-10-CM

## 2023-03-27 NOTE — TELEPHONE ENCOUNTER
Caller: Juana Hall    Relationship: Self    Best call back number: [phone]    Requested Prescriptions:   Requested Prescriptions     Pending Prescriptions Disp Refills   • Levemir FlexTouch 100 UNIT/ML injection 45 mL 1     Sig: Inject 50 Units under the skin into the appropriate area as directed Daily.        Pharmacy where request should be sent: [facility] [id number] [place] [id number]  AT [facility]  - [phone] [facility] [phone] FX     Last office visit with prescribing clinician: 3/21/2023   Last telemedicine visit with prescribing clinician: Visit date not found   Next office visit with prescribing clinician: Visit date not found     Additional details provided by patient: PATIENT STATES SHE HAD REQUESTED TO GET THIS REFILLED AND THE PHARMACY STATES THAT THE WRONG PRESCRIPTION WAS CALLED IN, SO SHE WASN'T ABLE TO PICK IT UP.     PLEASE ADVISE.     Does the patient have less than a 3 day supply:  [x] Yes  [] No    Would you like a call back once the refill request has been completed: [x] Yes [] No    If the office needs to give you a call back, can they leave a voicemail: [x] Yes [] No    Ana Alas Rep   03/27/23 16:20 EDT

## 2023-03-28 DIAGNOSIS — Z79.4 CONTROLLED TYPE 2 DIABETES MELLITUS WITHOUT COMPLICATION, WITH LONG-TERM CURRENT USE OF INSULIN: ICD-10-CM

## 2023-03-28 DIAGNOSIS — E11.9 CONTROLLED TYPE 2 DIABETES MELLITUS WITHOUT COMPLICATION, WITH LONG-TERM CURRENT USE OF INSULIN: ICD-10-CM

## 2023-03-29 DIAGNOSIS — Z79.4 CONTROLLED TYPE 2 DIABETES MELLITUS WITHOUT COMPLICATION, WITH LONG-TERM CURRENT USE OF INSULIN: Primary | ICD-10-CM

## 2023-03-29 DIAGNOSIS — E11.9 CONTROLLED TYPE 2 DIABETES MELLITUS WITHOUT COMPLICATION, WITH LONG-TERM CURRENT USE OF INSULIN: Primary | ICD-10-CM

## 2023-03-29 RX ORDER — INSULIN DETEMIR 100 [IU]/ML
50 INJECTION, SOLUTION SUBCUTANEOUS DAILY
Qty: 11 ML | Refills: 3 | Status: SHIPPED | OUTPATIENT
Start: 2023-03-29 | End: 2023-03-30 | Stop reason: SDUPTHER

## 2023-03-29 RX ORDER — INSULIN DETEMIR 100 [IU]/ML
50 INJECTION, SOLUTION SUBCUTANEOUS DAILY
Qty: 45 ML | Refills: 1 | Status: SHIPPED | OUTPATIENT
Start: 2023-03-29 | End: 2023-03-30

## 2023-03-29 RX ORDER — INSULIN DETEMIR 100 [IU]/ML
50 INJECTION, SOLUTION SUBCUTANEOUS DAILY
Qty: 45 ML | Refills: 1 | Status: SHIPPED | OUTPATIENT
Start: 2023-03-29 | End: 2023-03-29

## 2023-03-29 NOTE — TELEPHONE ENCOUNTER
Rx Refill Note  Requested Prescriptions     Pending Prescriptions Disp Refills   • Levemir FlexTouch 100 UNIT/ML injection 45 mL 1     Sig: Inject 50 Units under the skin into the appropriate area as directed Daily.      Last office visit with prescribing clinician: 3/21/2023   Last telemedicine visit with prescribing clinician: 3/28/2023   Next office visit with prescribing clinician: 3/28/2023

## 2023-03-30 ENCOUNTER — HOSPITAL ENCOUNTER (OUTPATIENT)
Dept: PET IMAGING | Facility: HOSPITAL | Age: 63
Discharge: HOME | End: 2023-03-30
Payer: COMMERCIAL

## 2023-03-30 ENCOUNTER — CLINICAL SUPPORT (OUTPATIENT)
Dept: ONCOLOGY | Facility: HOSPITAL | Age: 63
End: 2023-03-30
Payer: COMMERCIAL

## 2023-03-30 ENCOUNTER — LAB (OUTPATIENT)
Dept: LAB | Facility: HOSPITAL | Age: 63
End: 2023-03-30
Payer: COMMERCIAL

## 2023-03-30 DIAGNOSIS — C50.919 METASTATIC BREAST CANCER: ICD-10-CM

## 2023-03-30 LAB
BASOPHILS # BLD AUTO: 0.07 10*3/MM3 (ref 0–0.2)
BASOPHILS NFR BLD AUTO: 2.8 % (ref 0–1.5)
DEPRECATED RDW RBC AUTO: 68.4 FL (ref 37–54)
EOSINOPHIL # BLD AUTO: 0.2 10*3/MM3 (ref 0–0.4)
EOSINOPHIL NFR BLD AUTO: 7.9 % (ref 0.3–6.2)
ERYTHROCYTE [DISTWIDTH] IN BLOOD BY AUTOMATED COUNT: 17.9 % (ref 12.3–15.4)
GLUCOSE BLDC GLUCOMTR-MCNC: 152 MG/DL (ref 70–130)
HCT VFR BLD AUTO: 26 % (ref 34–46.6)
HGB BLD-MCNC: 8.7 G/DL (ref 12–15.9)
IMM GRANULOCYTES # BLD AUTO: 0.03 10*3/MM3 (ref 0–0.05)
IMM GRANULOCYTES NFR BLD AUTO: 1.2 % (ref 0–0.5)
LYMPHOCYTES # BLD AUTO: 0.52 10*3/MM3 (ref 0.7–3.1)
LYMPHOCYTES NFR BLD AUTO: 20.6 % (ref 19.6–45.3)
MCH RBC QN AUTO: 35.4 PG (ref 26.6–33)
MCHC RBC AUTO-ENTMCNC: 33.5 G/DL (ref 31.5–35.7)
MCV RBC AUTO: 105.7 FL (ref 79–97)
MONOCYTES # BLD AUTO: 0.29 10*3/MM3 (ref 0.1–0.9)
MONOCYTES NFR BLD AUTO: 11.5 % (ref 5–12)
NEUTROPHILS NFR BLD AUTO: 1.42 10*3/MM3 (ref 1.7–7)
NEUTROPHILS NFR BLD AUTO: 56 % (ref 42.7–76)
NRBC BLD AUTO-RTO: 0 /100 WBC (ref 0–0.2)
PLATELET # BLD AUTO: 105 10*3/MM3 (ref 140–450)
PMV BLD AUTO: 10.3 FL (ref 6–12)
RBC # BLD AUTO: 2.46 10*6/MM3 (ref 3.77–5.28)
WBC NRBC COR # BLD: 2.53 10*3/MM3 (ref 3.4–10.8)

## 2023-03-30 PROCEDURE — 0 FLUDEOXYGLUCOSE F18 SOLUTION: Performed by: INTERNAL MEDICINE

## 2023-03-30 PROCEDURE — 82962 GLUCOSE BLOOD TEST: CPT

## 2023-03-30 PROCEDURE — 36415 COLL VENOUS BLD VENIPUNCTURE: CPT

## 2023-03-30 PROCEDURE — G0463 HOSPITAL OUTPT CLINIC VISIT: HCPCS

## 2023-03-30 PROCEDURE — 78815 PET IMAGE W/CT SKULL-THIGH: CPT

## 2023-03-30 PROCEDURE — 85025 COMPLETE CBC W/AUTO DIFF WBC: CPT

## 2023-03-30 PROCEDURE — A9552 F18 FDG: HCPCS | Performed by: INTERNAL MEDICINE

## 2023-03-30 RX ORDER — INSULIN DETEMIR 100 [IU]/ML
50 INJECTION, SOLUTION SUBCUTANEOUS DAILY
Qty: 11 ML | Refills: 3 | Status: SHIPPED | OUTPATIENT
Start: 2023-03-30

## 2023-03-30 RX ADMIN — FLUDEOXYGLUCOSE F18 1 DOSE: 300 INJECTION INTRAVENOUS at 09:25

## 2023-03-30 NOTE — NURSING NOTE
Lab Results   Component Value Date    WBC 2.53 (L) 03/30/2023    HGB 8.7 (L) 03/30/2023    HCT 26.0 (L) 03/30/2023    .7 (H) 03/30/2023     (L) 03/30/2023     CBC REVIEWED W/ PT AND DR. HEATH. PT CONTINUES ON ABEMACICLIB 150MG BID. PT REPORTS THAT SHE IS TOLERATING WELL. COUNTS R/W DR. HEATH AND PT OK TO CONTINUE MED AND RTN AS SCHEDULED ON 4/13. PT GIVEN COPY OF RESULTS.

## 2023-04-12 NOTE — PROGRESS NOTES
"Chief Complaint  Metastatic lobular breast cancer (ER/[place] positive, HER-2/tj negative), anemia secondary to CKD3, CHF, peripheral neuropathy, chemotherapy related nausea chemotherapy-induced myelosuppression    Subjective        History of Present Illness  The patient returns today in follow-up continuing on monthly Faslodex due for cycle 18 today and continuing on abemaciclib 150 mg twice daily.  Patient continues to report that she feels better than she has in years.  She has been more active recently.  She denies any current musculoskeletal pain.  She continues on home dialysis 5 days/week and reports that this is going well.  She receives Mircera with nephrology every 4 weeks and is due next for treatment on 4/18/2023 when she will see Dr. Antonio again as well.  She does note however over the past week that she has developed gross hematuria.  She feels that this is mainly coming from her urine.  She does have some slight discomfort in the pelvic area.  She is due to see Dr. Mckee in urology tomorrow.  She has not experienced any fever or other infectious symptoms.  She has no other complaints today, reports a normal appetite, reports normal bowel function.  She continues on her motorized scooter    Objective   Vital Signs:   /57   Pulse 72   Temp 96.9 °F (36.1 °C) (Temporal)   Resp 18   Ht 170.2 cm (67.01\")   Wt (!) 165 kg (363 lb 12.1 oz) Comment: states  SpO2 95%   BMI 56.96 kg/m²     Physical Exam  Constitutional:       Appearance: She is well-developed. She is obese.      Comments: Patient is in a motorized scooter today   Eyes:      Conjunctiva/sclera: Conjunctivae normal.   Neck:      Thyroid: No thyromegaly.   Cardiovascular:      Rate and Rhythm: Normal rate and regular rhythm.      Heart sounds: Murmur heard.     No friction rub. No gallop.      Comments: 2/6 murmur  Pulmonary:      Effort: No respiratory distress.      Breath sounds: Normal breath sounds.   Abdominal:      General: Bowel " sounds are normal. There is no distension.      Palpations: Abdomen is soft.      Tenderness: There is no abdominal tenderness.   Musculoskeletal:         General: Swelling present.      Comments: 1+ bilateral lower extremity edema with venous stasis changes noted.  Left upper extremity fistula   Lymphadenopathy:      Head:      Right side of head: No submandibular adenopathy.      Cervical: No cervical adenopathy.      Upper Body:      Right upper body: No supraclavicular adenopathy.      Left upper body: No supraclavicular adenopathy.   Skin:     General: Skin is warm and dry.      Findings: No bruising or rash.   Neurological:      Mental Status: She is alert and oriented to person, place, and time.      Cranial Nerves: No cranial nerve deficit.      Motor: No abnormal muscle tone.      Deep Tendon Reflexes: Reflexes normal.   Psychiatric:         Behavior: Behavior normal.        Patient was examined today, unchanged from above    Result Review : Reviewed CBC, CMP from today.  Reviewed PET scan 3/30/2023.       Assessment and Plan    *Metastatic lobular breast cancer (ER/[place] positive, HER-2/tj negative):  · Previous stage IIIC right breast cancer in 2003, received neoadjuvant chemotherapy (unknown regimen) with subsequent bilateral mastectomies 1/22/2004 with right axillary dissection.  Residual 10-12 cm invasive lobular carcinoma on the right breast with 14/16+ nodes with extranodal extension.  Received adjuvant carboplatin and Navelbine chemotherapy x4 cycles  · Attempted adjuvant radiation to the chest wall however interrupted due to cellulitis that required removal of implants in August 2004  · Received tamoxifen from 6/22/2004 until June 2009.  Transitioned to Femara and received until June 2014  · Identification of CT findings concerning for carcinomatosis on CT scans and June 2019.  · PET scan confirmed hypermetabolic activity in the omentum as well as small retroperitoneal lymph nodes.  · CT-guided  omental biopsy 7/30/2019 with metastatic lobular carcinoma of breast primary, ER positive (greater than 95%), TN positive (90%), HER-2/tj negative (1+ IHC)  · [facility] liquid biopsy 2/5/2020: MSI undetermined, mutations in BETH, NF1, CHEK2.  No evidence of PIK3CA mutation.  · Subsequent [facility] germline testing 11/12/2021 with BETH VUS (c.2864C>A) and POLE VUS (c.631A>T)  · Initiation of Aromasin and Ibrance in August 2019  · Difficulty with myelosuppression related to Ibrance requiring dose alterations, eventually changed to 100 mg for 7 days on followed by 7 days off.  · CT chest abdomen pelvis 10/26/2021 with increase in left hydronephrosis with increase in left perinephric and periureteral stranding. Decrease in stone in the left kidney lower pole (7 mm).  Stable small retroperitoneal lymph and left periaortic lymph nodes.  · PET scan 11/10/2021 with multiple bilateral hypermetabolic nodular pulmonary lesions, asymmetric moderate to severe left hydronephrosis with ill-defined fat stranding and soft tissue thickening in the renal sinus and surrounding the left ureter, hypermetabolic left retroperitoneal lymph node with slight increase in size, ill-defined hypermetabolic thickening in the inferior omentum with increase in size, uptake and C7 (indeterminate, recommended MRI).  Short segments of uptake in the mid and distal esophagus possibly related to gastritis.  · PET scan findings felt to be consistent with progressive malignancy.  Patient declined repeat omental biopsy.   · Options for further treatment discussed on 11/12/2021.  In light of renal failure and dialysis, options are more limited.  Recommendation to continue Ibrance and discontinue Aromasin, begin Faslodex.  Discussed possible future use of single agent Taxol.    · Aromasin discontinued 11/12/2021  · On 11/22/2021 initiation of Faslodex with continuation of Ibrance (100 mg daily for 7 days on followed by 7 days off).  · MRI cervical spine  11/18/2021 showed the right C7 normality to likely represent metastatic disease.  With solitary bone lesion, did not recommend pursuit of bone modifying agent.  · Following 2 cycles Faslodex/Ibrance, PET scan on 1/11/2022 showed no change in the soft tissue superior to the dome of the bladder with hypermetabolic activity (likely related to carcinomatosis).  Significant decrease in injection of fat around the left renal pelvis and stable retroperitoneal hypermetabolic lymph nodes, decrease in size and activity in small bilateral pulmonary nodules.  Findings felt to represent evidence of response to treatment.    · Ibrance held briefly beginning 1/28/2022 due to hospitalization with COVID-19 infection and C. difficile colitis.  Patient developed leukopenia/neutropenia.  Ibrance resumed at previous dosing (100 mg daily 7 days on followed by 7 days off) on 2/9/22.  · Following 5 cycles Faslodex/Ibrance PET scan 4/19/2022 with evidence of mixed response.  New hypermetabolic lesion spinous process L2 (SUV 5.1) and slight increase in activity left clavicular metastasis (SUV increased 4.6-8.1).  C7 hypermetabolic mixed lytic and blastic bone lesion was unchanged.  Decrease in uptake involving omental thickening.  Stable soft tissue thickening around the left renal pelvis and ureter.  Discussion again regarding potential pursuit of IV chemotherapy with Taxol versus continuation of Ibrance and Faslodex with radiation to sites of bony disease at L2, left clavicle, C7.  Patient opted to defer initiation of systemic chemotherapy and continue Ibrance/Faslodex with consideration of radiation.  · Initiated monthly Xgeva on 5/19/2022 (cleared with nephrology).  Xgeva subsequently held due to significant hypocalcemia.  Decision made to forego further Xgeva with persistent hypocalcemia.  · Palliative radiation received to lumbar spine regarding L2 metastasis 5/23- 6/7/2022  · Ibrance held during radiation therapy beginning 5/22/2022.   Ibrance resumed 6/16/2022.  · PET scan 7/21/2022 with stable hypermetabolic abdominal pelvic lymph nodes and subcarinal lymph node, stable bone lesions at C7, left clavicle.  Stable soft tissue thickening around the left renal pelvis with left hydronephrosis.  Uptake in adrenal glands felt to be likely reactive (no change in size).  No activity noted at L2 (previously radiated).  New hypermetabolic lesion right anterior sixth rib.  · With evidence of further slight progression in bone on PET scan (new right sixth rib lesion), on 7/28/2022 discontinued Ibrance and plan to transition to abemaciclib with continuation of Faslodex.    · On 8/4/2022 initiated abemaciclib at reduced dose of 50 mg twice daily.  · On 8/25/2022, increased abemaciclib dose to 100 mg twice daily  · PET scan 10/6/2022 with stable findings with exception of left acetabular activity report noted new activity however on prior PET scan question of focal activity present previously.  No corresponding CT abnormality and significant increase in SUV at sacral lesion.  Scan otherwise stable.  Decision to continue current treatment  · Abemaciclib dose increased on 10/13/2022 up to 150 mg twice daily  · PET scan 12/29/2022 with artifactual accumulation of tracer right axilla and right mastectomy bed without visualized CT abnormality.  Hypermetabolic bone lesions with decrease in level of activity.  No new findings.  · PET scan 3/30/2023 with minimal/insignificant increase in activity in multiple bone lesions.  1 new focus of hypermetabolic activity left anterior acetabulum (SUV 10.7) however previously identified activity anterolateral left acetabulum is no longer present.  Subcutaneous fluid collection lateral right anterior chest wall nearly completely resolved.  · Due to continued clinical benefit from Faslodex/abemaciclib and with only 1 potential new finding in left acetabulum on PET scan, elected to continue current treatment at visit on 4/13/2023  and pursue MRI pelvis to evaluate left acetabulum with potential pursuit of radiation to solitary new left acetabular lesion.  · Abemaciclib held 4/13/2023 due to neutropenia (ANC 0.95) and thrombocytopenia (platelet count 93,000).  Plan to resume abemaciclib pending recovery of counts at reduced dose 150 mg a.m., 100 mg p.m.  · Patient returns today in follow-up due for cycle 18 Faslodex 500 mg IM every 4 weeks and continuing on abemaciclib 150 mg twice daily.  We are reviewing the above-mentioned PET scan from 3/30/2023.  Overall the PET scan shows stable bony involvement.  The minor increases in SUV are felt to be insignificant.  There is no evidence of active disease in the omentum or peritoneum, no visceral disease currently.  There is 1 potential new focus of activity in the left anterior acetabulum and a small area with SUV of 10.7.  On the prior scan there was an area of activity in the left acetabulum which is no longer evident today.  It is unclear whether some of this may be artifactual as there is a significant degree of scattered activity in the soft tissues.  I did suggest that we obtain MRI of the pelvis to further evaluate this area in the left acetabulum.  I did contact Dr. Antonio in nephrology who felt that the MRI could be performed with contrast if the patient has home dialysis performed after the procedure.  We did discuss this today.  We will refer the patient to radiation oncology and consider potential focused radiation to the area of new activity in the left acetabulum, particularly if there is evidence on MRI of corresponding lesion.  The patient is tolerating treatment very well with the exception of bowel suppression.  She reports that she feels better than she has in a number of years.  Therefore she is certainly gaining clinical benefit from treatment.  The only alternative it seems is transition to chemotherapy which would certainly significantly affect her quality of life and she  wishes to avoid this for as long as possible.  She does have declining counts related to abemaciclib with WBC 1.82/ANC 0.95, hemoglobin 7.3, platelet count 93,000 today.  We will proceed with cycle 18 Faslodex as planned today however we will hold abemaciclib for now.  She will return next week for NP visit and if counts have recovered we will resume abemaciclib at reduced dose of 150 mg in the morning and 100 mg in the evening.  We will determine timeframe to repeat counts at that visit.  I will see her in 4 weeks when she is due for cycle 19 Faslodex.     *Anemia secondary to ESRD, underlying malignancy/chronic disease, myelosuppression from CDK 4/6 inhibitor, GI blood loss:  · Anemia secondary to ESRD, malignancy with exacerbation by use of Ibrance  · Previous evidence of folate deficiency 8/12/2019 with level 3.84, initiated folic acid 1 mg daily  · Previous evidence of iron deficiency, received Injectafer on 8/7/2020 750 mg IV x1 dose.  Second dose not administered due to onset of fever, subsequent iron studies precluded further administration of IV iron.  · Previous low normal B12 level initiated oral B12 1000 mcg daily.  · Patient had previously received Procrit and required intermittent transfusion support  · Following initiation of hemodialysis, management of BEAU transitioned to nephrology, receiving Epogen with dialysis.  · Ongoing transfusion support prompted alteration in Ibrance dosing on 8/23/2021.  · After alteration in Ibrance dosing, hemoglobin improved and remained stable in the 9-10 range.  · Improved but ongoing intermittent need for transfusion support despite Ibrance dose alteration  · Stool negative for occult blood x3 on 11/2/2021  · Patient with reduced dialysis frequency to 2 days/week with concurrent decrease in Epogen dosing corresponding again to increased transfusion requirement.  · Epogen dose increased per nephrology to 20,000 units twice weekly with dialysis on Mondays and  Fridays  · Ibrance again exacerbating anemia with ongoing intermittent transfusion requirements.  Ibrance subsequently discontinued on 7/28/2022 and patient initiated abemaciclib on 8/4/2022 which may be less myelosuppressive.  · Additional transfusions required with hemoglobin on 3/31/2022 6.6, hemoglobin 4/21/2022 of 6.4, hemoglobin on 5/5/2022 of 6.8, hemoglobin 6.4 on 7/14/2022, hemoglobin 6.4 on 7/28/2022, hemoglobin 6.8 on 8/18/2022.  · Patient did develop transient visible blood in stool in July 2022  · Anemia evaluation 7/28/2022 with iron 32, ferritin 412, iron saturation 15%, TIBC 210, folate 19.9, B12 925, haptoglobin 300, .  · Stool positive for occult blood x3 on 8/1/2022  · Patient was seen by GI on 8/16/2022, felt to be high risk for endoscopic evaluation and elected to forego EGD/colonoscopy for now  · Patient transitioned from hemodialysis in clinic to home hemodialysis 5 days/week x 2 hours beginning 8/29/2022.  With transition to home dialysis, nephrology transition the patient from Epogen to Mircera administered every 4 weeks in their office, initiated 8/22/2022.  · Symptomatic hemoglobin 7.3 on 2/16/2023, received transfusion  · Additional labs on 3/16/2023 with B12 1146, folate greater than 20  · Patient returns today with hemoglobin that has declined down to 7.3.  She continues on Mircera every 4 weeks with nephrology, due next on 4/18/2023.  She was receiving IV iron as well however none recently with improvement in level.  The patient is relatively asymptomatic from her anemia currently but like to avoid further transfusion unless her hemoglobin is below 7.  We will recheck hemoglobin when she returns next week.    *ESRD on HD:  · Patient with previous CKD3/4  · Hospitalization 4/29-5/4/2021 with RYAN/CKD, UTI, anemia.  Required initiation of hemodialysis Tuesday Thursday Saturday.   · Decrease in frequency of dialysis to twice per week.  She continues to produce a significant amount  of urine.   · Status post left upper extremity AV fistula placement on 11/3/2021 by vascular surgery  · Attempt to hold further dialysis on 1/11/2022 with close monitoring of her laboratory and clinical parameters.  Dialysis quickly resumed due to development of hyperkalemia.  · Patient was undergoing dialysis 2 days/week on Mondays and Fridays.    · On 8/22/2022 patient transitioned to use of home dialysis device 5 days/week x 2 hours.  · As above, discussed with Dr. Antonio today regarding obtaining pelvic MRI with contrast and he felt this would be acceptable if patient has dialysis at home just after the scan.    *CHF with severe aortic stenosis:  · Echocardiogram 7/12/2019 showing ejection fraction 48% with moderate dilation of the LV cavity, global hypokinesis, grade 2 diastolic dysfunction, mild to moderate aortic stenosis, trivial pericardial effusion.  · Echocardiogram 7/19/2021 with ejection fraction 56%, left atrial volume moderately increased, grade 2 diastolic dysfunction, severe calcification aortic valve, moderate aortic stenosis, severe mitral calcification, mild mitral stenosis, marked elevation in RVSP from tricuspid regurgitation.  · Echocardiogram on 7/13/2022 with ejection fraction 56-60%, grade 2 diastolic dysfunction, severe aortic stenosis.    · Cardiac catheterization 8/23/2022 showed normal coronary arteries, mild to moderate pulmonary hypertension.    · She was evaluated by cardiothoracic surgery Dr. Pagan and there was discussion regarding potential candidacy for TAVR although there was concern given her underlying comorbidities including her metastatic breast cancer.  I discussed patient's prognosis with Dr. Pagan.  She does have opportunities for additional treatment of her malignancy with intravenous chemotherapy and is willing to pursue this in the future when needed.  It is unclear as to her expected survival however given her multiple severe comorbidities with metastatic breast cancer,  ESRD on dialysis, severe aortic stenosis, severe anemia.  There is consideration of possible pursuit of balloon valvuloplasty initially to assess her symptomatic response and then consider pursuit of TAVR in the future.   · Patient did undergo aortic valvuloplasty during admission 1/10 - 1/11/2023.    *Left upper and bilateral lower extremity peripheral neuropathy:  · Patient reports that left upper extremity neuropathy was not present from previous chemotherapy but occurred after hospitalization that required intubation and critical care stay.  Apparently felt to represent critical care neuropathy.  Improved over time.  · Bilateral lower extremity neuropathy felt to be related to diabetes  · May have future implications regarding treatment for breast cancer.  · Patient reports that peripheral neuropathy symptoms continue in the bilateral lower extremities, unchanged.  This will be a consideration with potential future use of Taxol    *Uterine/endometrial activity on PET scan:  · Patient experienced postmenopausal vaginal bleeding in 2013, negative endometrial biopsy and gynecologic evaluation.  · With findings on PET scan with enlarging uterus and some hypermetabolic activity, referred back to Dr. Oliveros in gynecology.    · 9/19/2019 D&C with hysteroscopy by Dr. Oliveros, no significant intraoperative findings, pathologic results with benign findings, no evidence of malignancy.    *Nausea:  · Mild, relieved with Zofran.   · Patient experienced improvement in nausea after initiating hemodialysis  · No recent nausea    *Myelosuppression secondary to CDK 4/6 inhibitor:  · Patient was able to maintain adequate WBC after dosing alteration on Ibrance to treatment at 100 mg daily for 7 days on followed by 7 days off.  · Subsequent transition from Ibrance to abemaciclib  · Overall counts improved on abemaciclib  · Patient with recent continued decline in counts felt to be secondary to abemaciclib.  Today, WBC 1.82/ANC 0.95,  platelet count 93,000.  We will need to hold abemaciclib for now.  We will recheck counts next week and consider resuming abemaciclib at reduced dose of 150 mg in the morning and 100 mg in the evening if counts have recovered.    *Depression  · Patient with history of depression, worsened after the death of her son in November 2021  · With evidence of progressive malignancy in November 2021, patient agreeable to referral to supportive oncology  · Patient currently receiving gabapentin 300 mg nightly, Zoloft 150 mg daily  · Patient does continue with difficulties regarding depression that wax and wane.  Currently symptoms under fair control.  Patient was previously seeing supportive oncology.  She does not feel that she needs further follow-up at this time however would not rule out returning to see supportive oncology in the future if needed.  We will plan to prescribe her Zoloft at this point.  She remains off of armodafinil.    *Left perinephric stranding secondary to malignancy with hydronephrosis:  · CT 4/22/2021 showed interval enlargement of 2 staghorn calculi in the left kidney with persistent left perinephric stranding  · Hospitalization 4/29-5/4/2021 with RYAN/CKD, UTI, anemia.  Required 2 unit PRBC transfusion and initiated hemodialysis Tuesday Thursday Saturday.    · Discussion from urology regarding potential need for surgical procedure to address staghorn calculus left kidney  · CT scan 7/2/2021 with only 1 remaining left renal stone identified which had decreased in size.  Patient appears to have passed the other stone.  · As above, CT 10/26/2021 with more prominent left hydronephrosis and increase in left perinephric and periureteral stranding.  Felt to possibly be related to passage of recent stone versus partial obstruction secondary to debris or thrombus in the left ureter versus pyelonephritis.  Patient however with no clinical evidence of recent stone passage nor pyelonephritis. Malignancy felt to  be the cause of CT changes around the left kidney at this point.   · Left ureteral stent replacement 12/16/2021  · PET scan 1/11/2022 with decrease in fat injection near left renal pelvis, stent in satisfactory position.  Mild residual hydronephrosis on the left.  · Ureteral stent replaced on 3/10/2022  · PET scan 4/19/2022 with no hydronephrosis, left ureteral stent in place  · PET scan 7/21/2022 with persistent left hydronephrosis, ureteral stent in place  · PET scan 10/6/2022 with left nephroureteral stent in place, overall stable findings  · PET scan 12/29/2022 with left nephroureteral stent remaining in place, stable findings  · PET scan 3/30/2023 with left ureteral stent in place.  · Patient will be following up with Dr. Mckee in urology to discuss potential need for stent replacement.    *Right thyroid nodules  · Patient underwent prior thyroid biopsy by her report with benign findings many years ago, records not available  · On MRI cervical spine 11/18/2021, finding of 1.8 cm right thyroid nodule  · Thyroid ultrasound 11/26/2021 with right-sided thyroid nodules, 1 nodule was hypoechoic, solid measuring 2 cm with biopsy recommended.  · Thyroid ultrasound results reviewed with patient.  In light of her metastatic breast cancer, renal failure the significance of the thyroid nodule is felt to be much less.  We discussed possibility of thyroid biopsy however patient is inclined to forego this, especially since she has had a biopsy performed in the past with benign findings by her report.    · No hypermetabolic activity identified on PET scan 1/11/2022 in the neck    *Hospitalization 1/28-1/30/2022 due to COVID-19 infection and C. difficile colitis  · Patient fully vaccinated with 3 doses Moderna COVID-19 vaccine  · Patient developed symptoms 1/26/2022 with abrupt onset sinus congestion, mild cough, subsequently developed nausea and diarrhea on 1/27/2022.  Significant fatigue.  · Patient tested positive in  emergency department on 1/28/2022 and was admitted  · Patient maintained O2 saturation in mid 90% range on room air  · Patient received remdesivir  · Patient was also found to be positive for C. difficile colitis, received course of oral vancomycin.  · Symptoms from both COVID-19 and C. difficile colitis ultimately resolved.    *Hypocalcemia  · Patient developed significant hypocalcemia after receiving Xgeva on 5/19/2022  · Calcium management per nephrology  · No further Xgeva planned due to persistent significant hypocalcemia  · Calcium today 10.0    *Dysuria with gross hematuria  · Patient has been experiencing gross hematuria over the past week with some mild dysuria.  · She reports that she will be seeing Dr. Mckee in urology tomorrow.  She does have ureteral stent that remains in place  · Urinalysis today suspicious for UTI, urine culture pending.  We will go ahead and prescribe Levaquin, dosed for dialysis at 250 mg every 48 hours x 4 doses    Plan:  1. Patient will proceed with Faslodex 500 mg IM injection today and continue every 28 days (today is cycle 18)  2. Hold abemaciclib due to neutropenia and thrombocytopenia  3. Urine culture pending today  4. Prescribed Levaquin 250 mg every 48 hours x 4 doses  5. Continue oral folic acid 1 mg daily, B12 1000 mcg daily.  6. The patient is continuing with home dialysis device 5 days/week x 2 hours.  7. Patient is receiving Mircera under the direction of nephrology, dosing every 4 weeks (due next on 4/18/2023) as well as IV iron every 2 weeks as needed (none recently).  8. Continue to monitor hemoglobin closely regarding ongoing transfusion needs for hemoglobin less than 7.0.  Patient does not wish to pursue transfusion with hemoglobin 7.3 today.  9. MRI pelvis in the next week or so.  I will contact Dr. Antonio in nephrology regarding dialysis management with MRI contrast.  10. Referral to radiation oncology to consider need for focal radiation to the new PET avid  left anterior acetabulum pending MRI confirmation.  11. In 1 week NP visit with CBC, CMP.  Consider resuming abemaciclib pending counts.  Possible dose reduction to 150 mg a.m., 100 mg p.m.  We will determine timeframe for further monitoring of counts as well.  12. MD visit in 4 weeks with CBC, CMP and patient will be due for cycle 19 Faslodex.    Patient continues on high risk medication requiring intensive monitoring.    I did spend 60 minutes in total time caring for patient today, time spent in review of records, face-to-face consultation, coordination of care, placement of orders, completion of documentation.

## 2023-04-13 ENCOUNTER — INFUSION (OUTPATIENT)
Dept: ONCOLOGY | Facility: HOSPITAL | Age: 63
End: 2023-04-13
Payer: COMMERCIAL

## 2023-04-13 ENCOUNTER — OFFICE VISIT (OUTPATIENT)
Dept: ONCOLOGY | Facility: CLINIC | Age: 63
End: 2023-04-13
Payer: COMMERCIAL

## 2023-04-13 ENCOUNTER — LAB (OUTPATIENT)
Dept: LAB | Facility: HOSPITAL | Age: 63
End: 2023-04-13
Payer: COMMERCIAL

## 2023-04-13 VITALS
SYSTOLIC BLOOD PRESSURE: 100 MMHG | HEIGHT: 67 IN | WEIGHT: 293 LBS | DIASTOLIC BLOOD PRESSURE: 57 MMHG | TEMPERATURE: 96.9 F | RESPIRATION RATE: 18 BRPM | OXYGEN SATURATION: 95 % | HEART RATE: 72 BPM | BODY MASS INDEX: 45.99 KG/M2

## 2023-04-13 DIAGNOSIS — R31.0 GROSS HEMATURIA: ICD-10-CM

## 2023-04-13 DIAGNOSIS — C50.919 PRIMARY MALIGNANT NEOPLASM OF BREAST WITH METASTASIS: Primary | ICD-10-CM

## 2023-04-13 DIAGNOSIS — C50.919 PRIMARY MALIGNANT NEOPLASM OF BREAST WITH METASTASIS: ICD-10-CM

## 2023-04-13 PROBLEM — R31.9 HEMATURIA: Status: ACTIVE | Noted: 2023-04-13

## 2023-04-13 LAB
ALBUMIN SERPL-MCNC: 2.9 G/DL (ref 3.5–5.2)
ALBUMIN/GLOB SERPL: 0.6 G/DL (ref 1.1–2.4)
ALP SERPL-CCNC: 103 U/L (ref 38–116)
ALT SERPL W P-5'-P-CCNC: 8 U/L (ref 0–33)
ANION GAP SERPL CALCULATED.3IONS-SCNC: 10.1 MMOL/L (ref 5–15)
AST SERPL-CCNC: 16 U/L (ref 0–32)
BACTERIA UR QL AUTO: ABNORMAL /HPF
BASOPHILS # BLD AUTO: 0.04 10*3/MM3 (ref 0–0.2)
BASOPHILS NFR BLD AUTO: 2.2 % (ref 0–1.5)
BILIRUB SERPL-MCNC: 0.5 MG/DL (ref 0.2–1.2)
BILIRUB UR QL STRIP: ABNORMAL
BUN SERPL-MCNC: 23 MG/DL (ref 6–20)
BUN/CREAT SERPL: 4.9 (ref 7.3–30)
CALCIUM SPEC-SCNC: 10 MG/DL (ref 8.5–10.2)
CHLORIDE SERPL-SCNC: 98 MMOL/L (ref 98–107)
CLARITY UR: ABNORMAL
CO2 SERPL-SCNC: 27.9 MMOL/L (ref 22–29)
COLOR UR: ABNORMAL
CREAT SERPL-MCNC: 4.7 MG/DL (ref 0.6–1.1)
DEPRECATED RDW RBC AUTO: 67.3 FL (ref 37–54)
EGFRCR SERPLBLD CKD-EPI 2021: 9.8 ML/MIN/1.73
EOSINOPHIL # BLD AUTO: 0.15 10*3/MM3 (ref 0–0.4)
EOSINOPHIL NFR BLD AUTO: 8.2 % (ref 0.3–6.2)
ERYTHROCYTE [DISTWIDTH] IN BLOOD BY AUTOMATED COUNT: 16.3 % (ref 12.3–15.4)
GLOBULIN UR ELPH-MCNC: 4.7 GM/DL (ref 1.8–3.5)
GLUCOSE SERPL-MCNC: 155 MG/DL (ref 74–124)
GLUCOSE UR STRIP-MCNC: NEGATIVE MG/DL
HCT VFR BLD AUTO: 23.2 % (ref 34–46.6)
HGB BLD-MCNC: 7.3 G/DL (ref 12–15.9)
HGB UR QL STRIP.AUTO: ABNORMAL
IMM GRANULOCYTES # BLD AUTO: 0 10*3/MM3 (ref 0–0.05)
IMM GRANULOCYTES NFR BLD AUTO: 0 % (ref 0–0.5)
KETONES UR QL STRIP: NEGATIVE
LEUKOCYTE ESTERASE UR QL STRIP.AUTO: ABNORMAL
LYMPHOCYTES # BLD AUTO: 0.53 10*3/MM3 (ref 0.7–3.1)
LYMPHOCYTES NFR BLD AUTO: 29.1 % (ref 19.6–45.3)
MCH RBC QN AUTO: 35.1 PG (ref 26.6–33)
MCHC RBC AUTO-ENTMCNC: 31.5 G/DL (ref 31.5–35.7)
MCV RBC AUTO: 111.5 FL (ref 79–97)
MONOCYTES # BLD AUTO: 0.15 10*3/MM3 (ref 0.1–0.9)
MONOCYTES NFR BLD AUTO: 8.2 % (ref 5–12)
NEUTROPHILS NFR BLD AUTO: 0.95 10*3/MM3 (ref 1.7–7)
NEUTROPHILS NFR BLD AUTO: 52.3 % (ref 42.7–76)
NITRITE UR QL STRIP: NEGATIVE
NRBC BLD AUTO-RTO: 0 /100 WBC (ref 0–0.2)
PH UR STRIP.AUTO: 7.5 [PH] (ref 4.5–8)
PLATELET # BLD AUTO: 93 10*3/MM3 (ref 140–450)
PMV BLD AUTO: 9.6 FL (ref 6–12)
POTASSIUM SERPL-SCNC: 3.4 MMOL/L (ref 3.5–4.7)
PROT SERPL-MCNC: 7.6 G/DL (ref 6.3–8)
PROT UR QL STRIP: ABNORMAL
RBC # BLD AUTO: 2.08 10*6/MM3 (ref 3.77–5.28)
RBC # UR STRIP: ABNORMAL /HPF
REF LAB TEST METHOD: ABNORMAL
SODIUM SERPL-SCNC: 136 MMOL/L (ref 134–145)
SP GR UR STRIP: 1.02 (ref 1–1.03)
SQUAMOUS #/AREA URNS HPF: ABNORMAL /HPF
UROBILINOGEN UR QL STRIP: ABNORMAL
WBC # UR STRIP: ABNORMAL /HPF
WBC NRBC COR # BLD: 1.82 10*3/MM3 (ref 3.4–10.8)

## 2023-04-13 PROCEDURE — 80053 COMPREHEN METABOLIC PANEL: CPT

## 2023-04-13 PROCEDURE — 87086 URINE CULTURE/COLONY COUNT: CPT | Performed by: INTERNAL MEDICINE

## 2023-04-13 PROCEDURE — 87077 CULTURE AEROBIC IDENTIFY: CPT | Performed by: INTERNAL MEDICINE

## 2023-04-13 PROCEDURE — 81001 URINALYSIS AUTO W/SCOPE: CPT | Performed by: INTERNAL MEDICINE

## 2023-04-13 PROCEDURE — 87186 SC STD MICRODIL/AGAR DIL: CPT | Performed by: INTERNAL MEDICINE

## 2023-04-13 PROCEDURE — 85025 COMPLETE CBC W/AUTO DIFF WBC: CPT

## 2023-04-13 PROCEDURE — 36415 COLL VENOUS BLD VENIPUNCTURE: CPT

## 2023-04-13 PROCEDURE — 25010000002 FULVESTRANT PER 25 MG: Performed by: INTERNAL MEDICINE

## 2023-04-13 PROCEDURE — 96402 CHEMO HORMON ANTINEOPL SQ/IM: CPT

## 2023-04-13 RX ORDER — LAMOTRIGINE 25 MG/1
500 TABLET ORAL ONCE
Status: COMPLETED | OUTPATIENT
Start: 2023-04-13 | End: 2023-04-13

## 2023-04-13 RX ORDER — LAMOTRIGINE 25 MG/1
500 TABLET ORAL ONCE
Status: CANCELLED
Start: 2023-04-13 | End: 2023-04-13

## 2023-04-13 RX ORDER — LEVOFLOXACIN 250 MG/1
250 TABLET ORAL EVERY OTHER DAY
Qty: 4 TABLET | Refills: 0 | Status: SHIPPED | OUTPATIENT
Start: 2023-04-13 | End: 2023-04-20 | Stop reason: SDUPTHER

## 2023-04-13 RX ADMIN — FULVESTRANT 500 MG: 50 INJECTION, SOLUTION INTRAMUSCULAR at 09:08

## 2023-04-13 NOTE — LETTER
April 13, 2023     Kiana (Avila) BETY Noriega  [address]  [address]  [facility] [address]    Patient: Juana Hall   YOB: 1960   Date of Visit: 4/13/2023       Dear BETY Steven:    Thank you for referring Juana Hall to me for evaluation. Below are the relevant portions of my assessment and plan of care.    If you have questions, please do not hesitate to call me. I look forward to following Juana along with you.         Sincerely,        Leodan Irvin MD        CC: MD Leodan Acosta Jr., MD Crystal H. McMahan, MD Huber, John L Jr., MD  04/13/23 1815  Sign when Signing Visit  Chief Complaint  Metastatic lobular breast cancer (ER/AZ positive, HER-2/tj negative), anemia secondary to CKD3, CHF, peripheral neuropathy, chemotherapy related nausea chemotherapy-induced myelosuppression    Subjective         History of Present Illness  The patient returns today in follow-up continuing on monthly Faslodex due for cycle 18 today and continuing on abemaciclib 150 mg twice daily.  Patient continues to report that she feels better than she has in years.  She has been more active recently.  She denies any current musculoskeletal pain.  She continues on home dialysis 5 days/week and reports that this is going well.  She receives Mircera with nephrology every 4 weeks and is due next for treatment on 4/18/2023 when she will see Dr. Antonio again as well.  She does note however over the past week that she has developed gross hematuria.  She feels that this is mainly coming from her urine.  She does have some slight discomfort in the pelvic area.  She is due to see Dr. Mckee in urology tomorrow.  She has not experienced any fever or other infectious symptoms.  She has no other complaints today, reports a normal appetite, reports normal bowel function.  She continues on her motorized scooter    Objective    Vital Signs:   /57   Pulse 72   Temp 96.9 °F (36.1  "°C) (Temporal)   Resp 18   Ht 170.2 cm (67.01\")   Wt (!) 165 kg (363 lb 12.1 oz) Comment: states  SpO2 95%   BMI 56.96 kg/m²     Physical Exam  Constitutional:       Appearance: She is well-developed. She is obese.      Comments: Patient is in a motorized scooter today   Eyes:      Conjunctiva/sclera: Conjunctivae normal.   Neck:      Thyroid: No thyromegaly.   Cardiovascular:      Rate and Rhythm: Normal rate and regular rhythm.      Heart sounds: Murmur heard.     No friction rub. No gallop.      Comments: 2/6 murmur  Pulmonary:      Effort: No respiratory distress.      Breath sounds: Normal breath sounds.   Abdominal:      General: Bowel sounds are normal. There is no distension.      Palpations: Abdomen is soft.      Tenderness: There is no abdominal tenderness.   Musculoskeletal:         General: Swelling present.      Comments: 1+ bilateral lower extremity edema with venous stasis changes noted.  Left upper extremity fistula   Lymphadenopathy:      Head:      Right side of head: No submandibular adenopathy.      Cervical: No cervical adenopathy.      Upper Body:      Right upper body: No supraclavicular adenopathy.      Left upper body: No supraclavicular adenopathy.   Skin:     General: Skin is warm and dry.      Findings: No bruising or rash.   Neurological:      Mental Status: She is alert and oriented to person, place, and time.      Cranial Nerves: No cranial nerve deficit.      Motor: No abnormal muscle tone.      Deep Tendon Reflexes: Reflexes normal.   Psychiatric:         Behavior: Behavior normal.        Patient was examined today, unchanged from above    Result Review : Reviewed CBC, CMP from today.  Reviewed PET scan 3/30/2023.      Assessment and Plan    *Metastatic lobular breast cancer (ER/AL positive, HER-2/tj negative):  Previous stage IIIC right breast cancer in 2003, received neoadjuvant chemotherapy (unknown regimen) with subsequent bilateral mastectomies 1/22/2004 with right " axillary dissection.  Residual 10-12 cm invasive lobular carcinoma on the right breast with 14/16+ nodes with extranodal extension.  Received adjuvant carboplatin and Navelbine chemotherapy x4 cycles  Attempted adjuvant radiation to the chest wall however interrupted due to cellulitis that required removal of implants in August 2004  Received tamoxifen from 6/22/2004 until June 2009.  Transitioned to Femara and received until June 2014  Identification of CT findings concerning for carcinomatosis on CT scans and June 2019.  PET scan confirmed hypermetabolic activity in the omentum as well as small retroperitoneal lymph nodes.  CT-guided omental biopsy 7/30/2019 with metastatic lobular carcinoma of breast primary, ER positive (greater than 95%), AR positive (90%), HER-2/tj negative (1+ IHC)  Foundation medicine liquid biopsy 2/5/2020: MSI undetermined, mutations in BETH, NF1, CHEK2.  No evidence of PIK3CA mutation.  Subsequent [facility] germline testing 11/12/2021 with BETH VUS (c.2864C>A) and POLE VUS (c.631A>T)  Initiation of Aromasin and Ibrance in August 2019  Difficulty with myelosuppression related to Ibrance requiring dose alterations, eventually changed to 100 mg for 7 days on followed by 7 days off.  CT chest abdomen pelvis 10/26/2021 with increase in left hydronephrosis with increase in left perinephric and periureteral stranding. Decrease in stone in the left kidney lower pole (7 mm).  Stable small retroperitoneal lymph and left periaortic lymph nodes.  PET scan 11/10/2021 with multiple bilateral hypermetabolic nodular pulmonary lesions, asymmetric moderate to severe left hydronephrosis with ill-defined fat stranding and soft tissue thickening in the renal sinus and surrounding the left ureter, hypermetabolic left retroperitoneal lymph node with slight increase in size, ill-defined hypermetabolic thickening in the inferior omentum with increase in size, uptake and C7 (indeterminate, recommended MRI).  Short  segments of uptake in the mid and distal esophagus possibly related to gastritis.  PET scan findings felt to be consistent with progressive malignancy.  Patient declined repeat omental biopsy.   Options for further treatment discussed on 11/12/2021.  In light of renal failure and dialysis, options are more limited.  Recommendation to continue Ibrance and discontinue Aromasin, begin Faslodex.  Discussed possible future use of single agent Taxol.    Aromasin discontinued 11/12/2021  On 11/22/2021 initiation of Faslodex with continuation of Ibrance (100 mg daily for 7 days on followed by 7 days off).  MRI cervical spine 11/18/2021 showed the right C7 normality to likely represent metastatic disease.  With solitary bone lesion, did not recommend pursuit of bone modifying agent.  Following 2 cycles Faslodex/Ibrance, PET scan on 1/11/2022 showed no change in the soft tissue superior to the dome of the bladder with hypermetabolic activity (likely related to carcinomatosis).  Significant decrease in injection of fat around the left renal pelvis and stable retroperitoneal hypermetabolic lymph nodes, decrease in size and activity in small bilateral pulmonary nodules.  Findings felt to represent evidence of response to treatment.    Ibrance held briefly beginning 1/28/2022 due to hospitalization with COVID-19 infection and C. difficile colitis.  Patient developed leukopenia/neutropenia.  Ibrance resumed at previous dosing (100 mg daily 7 days on followed by 7 days off) on 2/9/22.  Following 5 cycles Faslodex/Ibrance PET scan 4/19/2022 with evidence of mixed response.  New hypermetabolic lesion spinous process L2 (SUV 5.1) and slight increase in activity left clavicular metastasis (SUV increased 4.6-8.1).  C7 hypermetabolic mixed lytic and blastic bone lesion was unchanged.  Decrease in uptake involving omental thickening.  Stable soft tissue thickening around the left renal pelvis and ureter.  Discussion again regarding  potential pursuit of IV chemotherapy with Taxol versus continuation of Ibrance and Faslodex with radiation to sites of bony disease at L2, left clavicle, C7.  Patient opted to defer initiation of systemic chemotherapy and continue Ibrance/Faslodex with consideration of radiation.  Initiated monthly Xgeva on 5/19/2022 (cleared with nephrology).  Xgeva subsequently held due to significant hypocalcemia.  Decision made to forego further Xgeva with persistent hypocalcemia.  Palliative radiation received to lumbar spine regarding L2 metastasis 5/23- 6/7/2022  Ibrance held during radiation therapy beginning 5/22/2022.  Ibrance resumed 6/16/2022.  PET scan 7/21/2022 with stable hypermetabolic abdominal pelvic lymph nodes and subcarinal lymph node, stable bone lesions at C7, left clavicle.  Stable soft tissue thickening around the left renal pelvis with left hydronephrosis.  Uptake in adrenal glands felt to be likely reactive (no change in size).  No activity noted at L2 (previously radiated).  New hypermetabolic lesion right anterior sixth rib.  With evidence of further slight progression in bone on PET scan (new right sixth rib lesion), on 7/28/2022 discontinued Ibrance and plan to transition to abemaciclib with continuation of Faslodex.    On 8/4/2022 initiated abemaciclib at reduced dose of 50 mg twice daily.  On 8/25/2022, increased abemaciclib dose to 100 mg twice daily  PET scan 10/6/2022 with stable findings with exception of left acetabular activity report noted new activity however on prior PET scan question of focal activity present previously.  No corresponding CT abnormality and significant increase in SUV at sacral lesion.  Scan otherwise stable.  Decision to continue current treatment  Abemaciclib dose increased on 10/13/2022 up to 150 mg twice daily  PET scan 12/29/2022 with artifactual accumulation of tracer right axilla and right mastectomy bed without visualized CT abnormality.  Hypermetabolic bone lesions  with decrease in level of activity.  No new findings.  PET scan 3/30/2023 with minimal/insignificant increase in activity in multiple bone lesions.  1 new focus of hypermetabolic activity left anterior acetabulum (SUV 10.7) however previously identified activity anterolateral left acetabulum is no longer present.  Subcutaneous fluid collection lateral right anterior chest wall nearly completely resolved.  Due to continued clinical benefit from Faslodex/abemaciclib and with only 1 potential new finding in left acetabulum on PET scan, elected to continue current treatment at visit on 4/13/2023 and pursue MRI pelvis to evaluate left acetabulum with potential pursuit of radiation to solitary new left acetabular lesion.  Abemaciclib held 4/13/2023 due to neutropenia (ANC 0.95) and thrombocytopenia (platelet count 93,000).  Plan to resume abemaciclib pending recovery of counts at reduced dose 150 mg a.m., 100 mg p.m.  Patient returns today in follow-up due for cycle 18 Faslodex 500 mg IM every 4 weeks and continuing on abemaciclib 150 mg twice daily.  We are reviewing the above-mentioned PET scan from 3/30/2023.  Overall the PET scan shows stable bony involvement.  The minor increases in SUV are felt to be insignificant.  There is no evidence of active disease in the omentum or peritoneum, no visceral disease currently.  There is 1 potential new focus of activity in the left anterior acetabulum and a small area with SUV of 10.7.  On the prior scan there was an area of activity in the left acetabulum which is no longer evident today.  It is unclear whether some of this may be artifactual as there is a significant degree of scattered activity in the soft tissues.  I did suggest that we obtain MRI of the pelvis to further evaluate this area in the left acetabulum.  I did contact Dr. Antonio in nephrology who felt that the MRI could be performed with contrast if the patient has home dialysis performed after the procedure.  We  did discuss this today.  We will refer the patient to radiation oncology and consider potential focused radiation to the area of new activity in the left acetabulum, particularly if there is evidence on MRI of corresponding lesion.  The patient is tolerating treatment very well with the exception of bowel suppression.  She reports that she feels better than she has in a number of years.  Therefore she is certainly gaining clinical benefit from treatment.  The only alternative it seems is transition to chemotherapy which would certainly significantly affect her quality of life and she wishes to avoid this for as long as possible.  She does have declining counts related to abemaciclib with WBC 1.82/ANC 0.95, hemoglobin 7.3, platelet count 93,000 today.  We will proceed with cycle 18 Faslodex as planned today however we will hold abemaciclib for now.  She will return next week for NP visit and if counts have recovered we will resume abemaciclib at reduced dose of 150 mg in the morning and 100 mg in the evening.  We will determine timeframe to repeat counts at that visit.  I will see her in 4 weeks when she is due for cycle 19 Faslodex.     *Anemia secondary to ESRD, underlying malignancy/chronic disease, myelosuppression from CDK 4/6 inhibitor, GI blood loss:  Anemia secondary to ESRD, malignancy with exacerbation by use of Ibrance  Previous evidence of folate deficiency 8/12/2019 with level 3.84, initiated folic acid 1 mg daily  Previous evidence of iron deficiency, received Injectafer on 8/7/2020 750 mg IV x1 dose.  Second dose not administered due to onset of fever, subsequent iron studies precluded further administration of IV iron.  Previous low normal B12 level initiated oral B12 1000 mcg daily.  Patient had previously received Procrit and required intermittent transfusion support  Following initiation of hemodialysis, management of BEAU transitioned to nephrology, receiving Epogen with dialysis.  Ongoing  transfusion support prompted alteration in Ibrance dosing on 8/23/2021.  After alteration in Ibrance dosing, hemoglobin improved and remained stable in the 9-10 range.  Improved but ongoing intermittent need for transfusion support despite Ibrance dose alteration  Stool negative for occult blood x3 on 11/2/2021  Patient with reduced dialysis frequency to 2 days/week with concurrent decrease in Epogen dosing corresponding again to increased transfusion requirement.  Epogen dose increased per nephrology to 20,000 units twice weekly with dialysis on Mondays and Fridays  Ibrance again exacerbating anemia with ongoing intermittent transfusion requirements.  Ibrance subsequently discontinued on 7/28/2022 and patient initiated abemaciclib on 8/4/2022 which may be less myelosuppressive.  Additional transfusions required with hemoglobin on 3/31/2022 6.6, hemoglobin 4/21/2022 of 6.4, hemoglobin on 5/5/2022 of 6.8, hemoglobin 6.4 on 7/14/2022, hemoglobin 6.4 on 7/28/2022, hemoglobin 6.8 on 8/18/2022.  Patient did develop transient visible blood in stool in July 2022  Anemia evaluation 7/28/2022 with iron 32, ferritin 412, iron saturation 15%, TIBC 210, folate 19.9, B12 925, haptoglobin 300, .  Stool positive for occult blood x3 on 8/1/2022  Patient was seen by GI on 8/16/2022, felt to be high risk for endoscopic evaluation and elected to forego EGD/colonoscopy for now  Patient transitioned from hemodialysis in clinic to home hemodialysis 5 days/week x 2 hours beginning 8/29/2022.  With transition to home dialysis, nephrology transition the patient from Epogen to Mircera administered every 4 weeks in their office, initiated 8/22/2022.  Symptomatic hemoglobin 7.3 on 2/16/2023, received transfusion  Additional labs on 3/16/2023 with B12 1146, folate greater than 20  Patient returns today with hemoglobin that has declined down to 7.3.  She continues on Mircera every 4 weeks with nephrology, due next on 4/18/2023.  She was  receiving IV iron as well however none recently with improvement in level.  The patient is relatively asymptomatic from her anemia currently but like to avoid further transfusion unless her hemoglobin is below 7.  We will recheck hemoglobin when she returns next week.    *ESRD on HD:  Patient with previous CKD3/4  Hospitalization 4/29-5/4/2021 with RYAN/CKD, UTI, anemia.  Required initiation of hemodialysis Tuesday Thursday Saturday.   Decrease in frequency of dialysis to twice per week.  She continues to produce a significant amount of urine.   Status post left upper extremity AV fistula placement on 11/3/2021 by vascular surgery  Attempt to hold further dialysis on 1/11/2022 with close monitoring of her laboratory and clinical parameters.  Dialysis quickly resumed due to development of hyperkalemia.  Patient was undergoing dialysis 2 days/week on Mondays and Fridays.    On 8/22/2022 patient transitioned to use of home dialysis device 5 days/week x 2 hours.  As above, discussed with Dr. Antonio today regarding obtaining pelvic MRI with contrast and he felt this would be acceptable if patient has dialysis at home just after the scan.    *CHF with severe aortic stenosis:  Echocardiogram 7/12/2019 showing ejection fraction 48% with moderate dilation of the LV cavity, global hypokinesis, grade 2 diastolic dysfunction, mild to moderate aortic stenosis, trivial pericardial effusion.  Echocardiogram 7/19/2021 with ejection fraction 56%, left atrial volume moderately increased, grade 2 diastolic dysfunction, severe calcification aortic valve, moderate aortic stenosis, severe mitral calcification, mild mitral stenosis, marked elevation in RVSP from tricuspid regurgitation.  Echocardiogram on 7/13/2022 with ejection fraction 56-60%, grade 2 diastolic dysfunction, severe aortic stenosis.    Cardiac catheterization 8/23/2022 showed normal coronary arteries, mild to moderate pulmonary hypertension.    She was evaluated by  cardiothoracic surgery Dr. Pagan and there was discussion regarding potential candidacy for TAVR although there was concern given her underlying comorbidities including her metastatic breast cancer.  I discussed patient's prognosis with Dr. Pagan.  She does have opportunities for additional treatment of her malignancy with intravenous chemotherapy and is willing to pursue this in the future when needed.  It is unclear as to her expected survival however given her multiple severe comorbidities with metastatic breast cancer, ESRD on dialysis, severe aortic stenosis, severe anemia.  There is consideration of possible pursuit of balloon valvuloplasty initially to assess her symptomatic response and then consider pursuit of TAVR in the future.   Patient did undergo aortic valvuloplasty during admission 1/10 - 1/11/2023.    *Left upper and bilateral lower extremity peripheral neuropathy:  Patient reports that left upper extremity neuropathy was not present from previous chemotherapy but occurred after hospitalization that required intubation and critical care stay.  Apparently felt to represent critical care neuropathy.  Improved over time.  Bilateral lower extremity neuropathy felt to be related to diabetes  May have future implications regarding treatment for breast cancer.  Patient reports that peripheral neuropathy symptoms continue in the bilateral lower extremities, unchanged.  This will be a consideration with potential future use of Taxol    *Uterine/endometrial activity on PET scan:  Patient experienced postmenopausal vaginal bleeding in 2013, negative endometrial biopsy and gynecologic evaluation.  With findings on PET scan with enlarging uterus and some hypermetabolic activity, referred back to Dr. Oliveros in gynecology.    9/19/2019 D&C with hysteroscopy by Dr. Oliveros, no significant intraoperative findings, pathologic results with benign findings, no evidence of malignancy.    *Nausea:  Mild, relieved with  Zofran.   Patient experienced improvement in nausea after initiating hemodialysis  No recent nausea    *Myelosuppression secondary to CDK 4/6 inhibitor:  Patient was able to maintain adequate WBC after dosing alteration on Ibrance to treatment at 100 mg daily for 7 days on followed by 7 days off.  Subsequent transition from Ibrance to abemaciclib  Overall counts improved on abemaciclib  Patient with recent continued decline in counts felt to be secondary to abemaciclib.  Today, WBC 1.82/ANC 0.95, platelet count 93,000.  We will need to hold abemaciclib for now.  We will recheck counts next week and consider resuming abemaciclib at reduced dose of 150 mg in the morning and 100 mg in the evening if counts have recovered.    *Depression  Patient with history of depression, worsened after the death of her son in November 2021  With evidence of progressive malignancy in November 2021, patient agreeable to referral to supportive oncology  Patient currently receiving gabapentin 300 mg nightly, Zoloft 150 mg daily  Patient does continue with difficulties regarding depression that wax and wane.  Currently symptoms under fair control.  Patient was previously seeing supportive oncology.  She does not feel that she needs further follow-up at this time however would not rule out returning to see supportive oncology in the future if needed.  We will plan to prescribe her Zoloft at this point.  She remains off of armodafinil.    *Left perinephric stranding secondary to malignancy with hydronephrosis:  CT 4/22/2021 showed interval enlargement of 2 staghorn calculi in the left kidney with persistent left perinephric stranding  Hospitalization 4/29-5/4/2021 with RYAN/CKD, UTI, anemia.  Required 2 unit PRBC transfusion and initiated hemodialysis Tuesday Thursday Saturday.    Discussion from urology regarding potential need for surgical procedure to address staghorn calculus left kidney  CT scan 7/2/2021 with only 1 remaining left renal  stone identified which had decreased in size.  Patient appears to have passed the other stone.  As above, CT 10/26/2021 with more prominent left hydronephrosis and increase in left perinephric and periureteral stranding.  Felt to possibly be related to passage of recent stone versus partial obstruction secondary to debris or thrombus in the left ureter versus pyelonephritis.  Patient however with no clinical evidence of recent stone passage nor pyelonephritis. Malignancy felt to be the cause of CT changes around the left kidney at this point.   Left ureteral stent replacement 12/16/2021  PET scan 1/11/2022 with decrease in fat injection near left renal pelvis, stent in satisfactory position.  Mild residual hydronephrosis on the left.  Ureteral stent replaced on 3/10/2022  PET scan 4/19/2022 with no hydronephrosis, left ureteral stent in place  PET scan 7/21/2022 with persistent left hydronephrosis, ureteral stent in place  PET scan 10/6/2022 with left nephroureteral stent in place, overall stable findings  PET scan 12/29/2022 with left nephroureteral stent remaining in place, stable findings  PET scan 3/30/2023 with left ureteral stent in place.  Patient will be following up with Dr. Mckee in urology to discuss potential need for stent replacement.    *Right thyroid nodules  Patient underwent prior thyroid biopsy by her report with benign findings many years ago, records not available  On MRI cervical spine 11/18/2021, finding of 1.8 cm right thyroid nodule  Thyroid ultrasound 11/26/2021 with right-sided thyroid nodules, 1 nodule was hypoechoic, solid measuring 2 cm with biopsy recommended.  Thyroid ultrasound results reviewed with patient.  In light of her metastatic breast cancer, renal failure the significance of the thyroid nodule is felt to be much less.  We discussed possibility of thyroid biopsy however patient is inclined to forego this, especially since she has had a biopsy performed in the past with  benign findings by her report.    No hypermetabolic activity identified on PET scan 1/11/2022 in the neck    *Hospitalization 1/28-1/30/2022 due to COVID-19 infection and C. difficile colitis  Patient fully vaccinated with 3 doses Moderna COVID-19 vaccine  Patient developed symptoms 1/26/2022 with abrupt onset sinus congestion, mild cough, subsequently developed nausea and diarrhea on 1/27/2022.  Significant fatigue.  Patient tested positive in emergency department on 1/28/2022 and was admitted  Patient maintained O2 saturation in mid 90% range on room air  Patient received remdesivir  Patient was also found to be positive for C. difficile colitis, received course of oral vancomycin.  Symptoms from both COVID-19 and C. difficile colitis ultimately resolved.    *Hypocalcemia  Patient developed significant hypocalcemia after receiving Xgeva on 5/19/2022  Calcium management per nephrology  No further Xgeva planned due to persistent significant hypocalcemia  Calcium today 10.0    *Dysuria with gross hematuria  Patient has been experiencing gross hematuria over the past week with some mild dysuria.  She reports that she will be seeing Dr. Mckee in urology tomorrow.  She does have ureteral stent that remains in place  Urinalysis today suspicious for UTI, urine culture pending.  We will go ahead and prescribe Levaquin, dosed for dialysis at 250 mg every 48 hours x 4 doses    Plan:  Patient will proceed with Faslodex 500 mg IM injection today and continue every 28 days (today is cycle 18)  Hold abemaciclib due to neutropenia and thrombocytopenia  Urine culture pending today  Prescribed Levaquin 250 mg every 48 hours x 4 doses  Continue oral folic acid 1 mg daily, B12 1000 mcg daily.  The patient is continuing with home dialysis device 5 days/week x 2 hours.  Patient is receiving Mircera under the direction of nephrology, dosing every 4 weeks (due next on 4/18/2023) as well as IV iron every 2 weeks as needed (none  recently).  Continue to monitor hemoglobin closely regarding ongoing transfusion needs for hemoglobin less than 7.0.  Patient does not wish to pursue transfusion with hemoglobin 7.3 today.  MRI pelvis in the next week or so.  I will contact Dr. Antonio in nephrology regarding dialysis management with MRI contrast.  Referral to radiation oncology to consider need for focal radiation to the new PET avid left anterior acetabulum pending MRI confirmation.  In 1 week NP visit with CBC, CMP.  Consider resuming abemaciclib pending counts.  Possible dose reduction to 150 mg a.m., 100 mg p.m.  We will determine timeframe for further monitoring of counts as well.  MD visit in 4 weeks with CBC, CMP and patient will be due for cycle 19 Faslodex.    Patient continues on high risk medication requiring intensive monitoring.    I did spend 60 minutes in total time caring for patient today, time spent in review of records, face-to-face consultation, coordination of care, placement of orders, completion of documentation.

## 2023-04-15 LAB
BACTERIA SPEC AEROBE CULT: ABNORMAL
BACTERIA SPEC AEROBE CULT: ABNORMAL

## 2023-04-20 ENCOUNTER — OFFICE VISIT (OUTPATIENT)
Dept: ONCOLOGY | Facility: CLINIC | Age: 63
End: 2023-04-20
Payer: COMMERCIAL

## 2023-04-20 ENCOUNTER — SPECIALTY PHARMACY (OUTPATIENT)
Dept: PHARMACY | Facility: HOSPITAL | Age: 63
End: 2023-04-20
Payer: COMMERCIAL

## 2023-04-20 ENCOUNTER — LAB (OUTPATIENT)
Dept: LAB | Facility: HOSPITAL | Age: 63
End: 2023-04-20
Payer: COMMERCIAL

## 2023-04-20 VITALS
HEART RATE: 76 BPM | OXYGEN SATURATION: 99 % | TEMPERATURE: 97.2 F | BODY MASS INDEX: 45.99 KG/M2 | SYSTOLIC BLOOD PRESSURE: 102 MMHG | HEIGHT: 67 IN | WEIGHT: 293 LBS | DIASTOLIC BLOOD PRESSURE: 60 MMHG

## 2023-04-20 DIAGNOSIS — C50.919 PRIMARY MALIGNANT NEOPLASM OF BREAST WITH METASTASIS: ICD-10-CM

## 2023-04-20 DIAGNOSIS — D63.1 ANEMIA IN STAGE 3 CHRONIC KIDNEY DISEASE, UNSPECIFIED WHETHER STAGE 3A OR 3B CKD: ICD-10-CM

## 2023-04-20 DIAGNOSIS — N18.30 ANEMIA IN STAGE 3 CHRONIC KIDNEY DISEASE, UNSPECIFIED WHETHER STAGE 3A OR 3B CKD: ICD-10-CM

## 2023-04-20 DIAGNOSIS — Z79.899 HIGH RISK MEDICATION USE: Primary | ICD-10-CM

## 2023-04-20 LAB
ABO GROUP BLD: NORMAL
ALBUMIN SERPL-MCNC: 3 G/DL (ref 3.5–5.2)
ALBUMIN/GLOB SERPL: 0.7 G/DL (ref 1.1–2.4)
ALP SERPL-CCNC: 106 U/L (ref 38–116)
ALT SERPL W P-5'-P-CCNC: 10 U/L (ref 0–33)
ANION GAP SERPL CALCULATED.3IONS-SCNC: 12.9 MMOL/L (ref 5–15)
AST SERPL-CCNC: 18 U/L (ref 0–32)
BASOPHILS # BLD AUTO: 0.03 10*3/MM3 (ref 0–0.2)
BASOPHILS NFR BLD AUTO: 1.4 % (ref 0–1.5)
BILIRUB SERPL-MCNC: 0.4 MG/DL (ref 0.2–1.2)
BLD GP AB SCN SERPL QL: NEGATIVE
BUN SERPL-MCNC: 19 MG/DL (ref 6–20)
BUN/CREAT SERPL: 5 (ref 7.3–30)
CALCIUM SPEC-SCNC: 9.1 MG/DL (ref 8.5–10.2)
CHLORIDE SERPL-SCNC: 98 MMOL/L (ref 98–107)
CO2 SERPL-SCNC: 27.1 MMOL/L (ref 22–29)
CREAT SERPL-MCNC: 3.77 MG/DL (ref 0.6–1.1)
DEPRECATED RDW RBC AUTO: 70.7 FL (ref 37–54)
EGFRCR SERPLBLD CKD-EPI 2021: 12.8 ML/MIN/1.73
EOSINOPHIL # BLD AUTO: 0.23 10*3/MM3 (ref 0–0.4)
EOSINOPHIL NFR BLD AUTO: 10.5 % (ref 0.3–6.2)
ERYTHROCYTE [DISTWIDTH] IN BLOOD BY AUTOMATED COUNT: 17 % (ref 12.3–15.4)
GLOBULIN UR ELPH-MCNC: 4.6 GM/DL (ref 1.8–3.5)
GLUCOSE SERPL-MCNC: 215 MG/DL (ref 74–124)
HCT VFR BLD AUTO: 19.6 % (ref 34–46.6)
HGB BLD-MCNC: 6.1 G/DL (ref 12–15.9)
IMM GRANULOCYTES # BLD AUTO: 0.04 10*3/MM3 (ref 0–0.05)
IMM GRANULOCYTES NFR BLD AUTO: 1.8 % (ref 0–0.5)
LYMPHOCYTES # BLD AUTO: 0.41 10*3/MM3 (ref 0.7–3.1)
LYMPHOCYTES NFR BLD AUTO: 18.6 % (ref 19.6–45.3)
MCH RBC QN AUTO: 35.9 PG (ref 26.6–33)
MCHC RBC AUTO-ENTMCNC: 31.1 G/DL (ref 31.5–35.7)
MCV RBC AUTO: 115.3 FL (ref 79–97)
MONOCYTES # BLD AUTO: 0.22 10*3/MM3 (ref 0.1–0.9)
MONOCYTES NFR BLD AUTO: 10 % (ref 5–12)
NEUTROPHILS NFR BLD AUTO: 1.27 10*3/MM3 (ref 1.7–7)
NEUTROPHILS NFR BLD AUTO: 57.7 % (ref 42.7–76)
NRBC BLD AUTO-RTO: 0.9 /100 WBC (ref 0–0.2)
PLATELET # BLD AUTO: 98 10*3/MM3 (ref 140–450)
PMV BLD AUTO: 9.9 FL (ref 6–12)
POTASSIUM SERPL-SCNC: 3.7 MMOL/L (ref 3.5–4.7)
PROT SERPL-MCNC: 7.6 G/DL (ref 6.3–8)
RBC # BLD AUTO: 1.7 10*6/MM3 (ref 3.77–5.28)
RH BLD: POSITIVE
SODIUM SERPL-SCNC: 138 MMOL/L (ref 134–145)
T&S EXPIRATION DATE: NORMAL
WBC NRBC COR # BLD: 2.2 10*3/MM3 (ref 3.4–10.8)

## 2023-04-20 PROCEDURE — 86920 COMPATIBILITY TEST SPIN: CPT

## 2023-04-20 PROCEDURE — 86850 RBC ANTIBODY SCREEN: CPT | Performed by: NURSE PRACTITIONER

## 2023-04-20 PROCEDURE — 36415 COLL VENOUS BLD VENIPUNCTURE: CPT

## 2023-04-20 PROCEDURE — 86922 COMPATIBILITY TEST ANTIGLOB: CPT

## 2023-04-20 PROCEDURE — 86900 BLOOD TYPING SEROLOGIC ABO: CPT | Performed by: NURSE PRACTITIONER

## 2023-04-20 PROCEDURE — 86902 BLOOD TYPE ANTIGEN DONOR EA: CPT

## 2023-04-20 PROCEDURE — 80053 COMPREHEN METABOLIC PANEL: CPT

## 2023-04-20 PROCEDURE — 86901 BLOOD TYPING SEROLOGIC RH(D): CPT | Performed by: NURSE PRACTITIONER

## 2023-04-20 PROCEDURE — 85025 COMPLETE CBC W/AUTO DIFF WBC: CPT

## 2023-04-20 RX ORDER — DIPHENHYDRAMINE HCL 25 MG
25 CAPSULE ORAL ONCE
Status: CANCELLED | OUTPATIENT
Start: 2023-04-21 | End: 2023-04-20

## 2023-04-20 RX ORDER — ABEMACICLIB 150 MG/1
1 TABLET ORAL EVERY MORNING
Qty: 28 TABLET | Refills: 5 | Status: SHIPPED | OUTPATIENT
Start: 2023-04-20

## 2023-04-20 RX ORDER — ACETAMINOPHEN 325 MG/1
650 TABLET ORAL ONCE
Status: CANCELLED | OUTPATIENT
Start: 2023-04-21 | End: 2023-04-20

## 2023-04-20 RX ORDER — SODIUM CHLORIDE 9 MG/ML
250 INJECTION, SOLUTION INTRAVENOUS AS NEEDED
Status: CANCELLED | OUTPATIENT
Start: 2023-04-21

## 2023-04-20 RX ORDER — LEVOFLOXACIN 250 MG/1
250 TABLET ORAL EVERY OTHER DAY
Qty: 4 TABLET | Refills: 0 | Status: SHIPPED | OUTPATIENT
Start: 2023-04-20 | End: 2023-04-27

## 2023-04-20 RX ORDER — ABEMACICLIB 100 MG/1
100 TABLET ORAL EVERY EVENING
Qty: 28 TABLET | Refills: 5 | Status: SHIPPED | OUTPATIENT
Start: 2023-04-20

## 2023-04-20 NOTE — PROGRESS NOTES
Re: Refills of Oral Specialty Medication - Verzenio (abemaciclib)    • Drug-Drug Interactions: The current medication list was reviewed and there are no relevant drug-drug interactions.  • Medication Allergies: The patient has NKDA  • Review of Labs/Dose Adjustments: DOSE CHANGE - I reviewed the most recent note and labs. Due to neutropenia the dose is being decreased. I sent refills as described below.    Drug: Verzenio (abemaciclib)  Strength: 150 mg   Directions: Take 1 tablet by mouth daily in the morning  Quantity: 28  Refills: 5    Drug: Verzenio (abemaciclib)  Strength: 100 mg   Directions: Take 1 tablet by mouth daily in the evening  Quantity: 28  Refills: 5  Pharmacy prescription sent to: [facility]    Name/Credentials: Wiley España, ReinaD, BCOP      4/20/2023  16:03 EDT    Completed independent double check on medication order/RX.     Thanks,   Myah Argueta, ReinaD, BCPS

## 2023-04-20 NOTE — PROGRESS NOTES
"Chief Complaint  Metastatic lobular breast cancer (ER/OR positive, HER-2/tj negative), anemia secondary to CKD3, CHF, peripheral neuropathy, chemotherapy related nausea chemotherapy-induced myelosuppression    Subjective        History of Present Illness  Patient is seen back today in 1 week follow-up.  When seen last week her ANC was noted to have dropped to 0.9, platelets 93,000.  We asked her to hold abemaciclib.  That she is reviewed back today hemoglobin is now also worse at 6.1 and she will require transfusion.  We discussed her other counts, specifically WBC improving to 2.2, ANC 1.2, platelets slightly better at 98,000.  Discussed continuing to hold abemaciclib but potential to resume at lower dose next week pending continued count recovery.  She otherwise denies further concerns today.    Objective   Vital Signs:   /60   Pulse 76   Temp 97.2 °F (36.2 °C) (Temporal)   Ht 170.2 cm (67.01\")   Wt (!) 165 kg (363 lb 12.1 oz)   SpO2 99%   BMI 56.96 kg/m²     Physical Exam  Constitutional:       Appearance: She is well-developed. She is obese.      Comments: Patient is in a motorized scooter today   Eyes:      Conjunctiva/sclera: Conjunctivae normal.   Neck:      Thyroid: No thyromegaly.   Cardiovascular:      Rate and Rhythm: Normal rate and regular rhythm.      Heart sounds: Murmur heard.     No friction rub. No gallop.      Comments: 2/6 murmur  Pulmonary:      Effort: No respiratory distress.      Breath sounds: Normal breath sounds.   Abdominal:      General: Bowel sounds are normal. There is no distension.      Palpations: Abdomen is soft.      Tenderness: There is no abdominal tenderness.   Musculoskeletal:         General: Swelling present.      Comments: 1+ bilateral lower extremity edema with venous stasis changes noted.  Left upper extremity fistula   Lymphadenopathy:      Head:      Right side of head: No submandibular adenopathy.      Cervical: No cervical adenopathy.      Upper Body:     "  Right upper body: No supraclavicular adenopathy.      Left upper body: No supraclavicular adenopathy.   Skin:     General: Skin is warm and dry.      Findings: No bruising or rash.   Neurological:      Mental Status: She is alert and oriented to person, place, and time.      Cranial Nerves: No cranial nerve deficit.      Motor: No abnormal muscle tone.      Deep Tendon Reflexes: Reflexes normal.   Psychiatric:         Behavior: Behavior normal.        Patient was examined today, unchanged from above    Result Review :   Results from last 7 days   Lab Units 04/20/23  1250   WBC 10*3/mm3 2.20*   NEUTROS ABS 10*3/mm3 1.27*   HEMOGLOBIN g/dL 6.1*   HEMATOCRIT % 19.6*   PLATELETS 10*3/mm3 98*                    Assessment and Plan    *Metastatic lobular breast cancer (ER/SD positive, HER-2/tj negative):  · Previous stage IIIC right breast cancer in 2003, received neoadjuvant chemotherapy (unknown regimen) with subsequent bilateral mastectomies 1/22/2004 with right axillary dissection.  Residual 10-12 cm invasive lobular carcinoma on the right breast with 14/16+ nodes with extranodal extension.  Received adjuvant carboplatin and Navelbine chemotherapy x4 cycles  · Attempted adjuvant radiation to the chest wall however interrupted due to cellulitis that required removal of implants in August 2004  · Received tamoxifen from 6/22/2004 until June 2009.  Transitioned to Femara and received until June 2014  · Identification of CT findings concerning for carcinomatosis on CT scans and June 2019.  · PET scan confirmed hypermetabolic activity in the omentum as well as small retroperitoneal lymph nodes.  · CT-guided omental biopsy 7/30/2019 with metastatic lobular carcinoma of breast primary, ER positive (greater than 95%), SD positive (90%), HER-2/tj negative (1+ IHC)  · [facility] liquid biopsy 2/5/2020: MSI undetermined, mutations in BETH, NF1, CHEK2.  No evidence of PIK3CA mutation.  · Subsequent [facility] germline  testing 11/12/2021 with BETH VUS (c.2864C>A) and POLE VUS (c.631A>T)  · Initiation of Aromasin and Ibrance in August 2019  · Difficulty with myelosuppression related to Ibrance requiring dose alterations, eventually changed to 100 mg for 7 days on followed by 7 days off.  · CT chest abdomen pelvis 10/26/2021 with increase in left hydronephrosis with increase in left perinephric and periureteral stranding. Decrease in stone in the left kidney lower pole (7 mm).  Stable small retroperitoneal lymph and left periaortic lymph nodes.  · PET scan 11/10/2021 with multiple bilateral hypermetabolic nodular pulmonary lesions, asymmetric moderate to severe left hydronephrosis with ill-defined fat stranding and soft tissue thickening in the renal sinus and surrounding the left ureter, hypermetabolic left retroperitoneal lymph node with slight increase in size, ill-defined hypermetabolic thickening in the inferior omentum with increase in size, uptake and C7 (indeterminate, recommended MRI).  Short segments of uptake in the mid and distal esophagus possibly related to gastritis.  · PET scan findings felt to be consistent with progressive malignancy.  Patient declined repeat omental biopsy.   · Options for further treatment discussed on 11/12/2021.  In light of renal failure and dialysis, options are more limited.  Recommendation to continue Ibrance and discontinue Aromasin, begin Faslodex.  Discussed possible future use of single agent Taxol.    · Aromasin discontinued 11/12/2021  · On 11/22/2021 initiation of Faslodex with continuation of Ibrance (100 mg daily for 7 days on followed by 7 days off).  · MRI cervical spine 11/18/2021 showed the right C7 normality to likely represent metastatic disease.  With solitary bone lesion, did not recommend pursuit of bone modifying agent.  · Following 2 cycles Faslodex/Ibrance, PET scan on 1/11/2022 showed no change in the soft tissue superior to the dome of the bladder with hypermetabolic  activity (likely related to carcinomatosis).  Significant decrease in injection of fat around the left renal pelvis and stable retroperitoneal hypermetabolic lymph nodes, decrease in size and activity in small bilateral pulmonary nodules.  Findings felt to represent evidence of response to treatment.    · Ibrance held briefly beginning 1/28/2022 due to hospitalization with COVID-19 infection and C. difficile colitis.  Patient developed leukopenia/neutropenia.  Ibrance resumed at previous dosing (100 mg daily 7 days on followed by 7 days off) on 2/9/22.  · Following 5 cycles Faslodex/Ibrance PET scan 4/19/2022 with evidence of mixed response.  New hypermetabolic lesion spinous process L2 (SUV 5.1) and slight increase in activity left clavicular metastasis (SUV increased 4.6-8.1).  C7 hypermetabolic mixed lytic and blastic bone lesion was unchanged.  Decrease in uptake involving omental thickening.  Stable soft tissue thickening around the left renal pelvis and ureter.  Discussion again regarding potential pursuit of IV chemotherapy with Taxol versus continuation of Ibrance and Faslodex with radiation to sites of bony disease at L2, left clavicle, C7.  Patient opted to defer initiation of systemic chemotherapy and continue Ibrance/Faslodex with consideration of radiation.  · Initiated monthly Xgeva on 5/19/2022 (cleared with nephrology).  Xgeva subsequently held due to significant hypocalcemia.  Decision made to forego further Xgeva with persistent hypocalcemia.  · Palliative radiation received to lumbar spine regarding L2 metastasis 5/23- 6/7/2022  · Ibrance held during radiation therapy beginning 5/22/2022.  Ibrance resumed 6/16/2022.  · PET scan 7/21/2022 with stable hypermetabolic abdominal pelvic lymph nodes and subcarinal lymph node, stable bone lesions at C7, left clavicle.  Stable soft tissue thickening around the left renal pelvis with left hydronephrosis.  Uptake in adrenal glands felt to be likely reactive  (no change in size).  No activity noted at L2 (previously radiated).  New hypermetabolic lesion right anterior sixth rib.  · With evidence of further slight progression in bone on PET scan (new right sixth rib lesion), on 7/28/2022 discontinued Ibrance and plan to transition to abemaciclib with continuation of Faslodex.    · On 8/4/2022 initiated abemaciclib at reduced dose of 50 mg twice daily.  · On 8/25/2022, increased abemaciclib dose to 100 mg twice daily  · PET scan 10/6/2022 with stable findings with exception of left acetabular activity report noted new activity however on prior PET scan question of focal activity present previously.  No corresponding CT abnormality and significant increase in SUV at sacral lesion.  Scan otherwise stable.  Decision to continue current treatment  · Abemaciclib dose increased on 10/13/2022 up to 150 mg twice daily  · PET scan 12/29/2022 with artifactual accumulation of tracer right axilla and right mastectomy bed without visualized CT abnormality.  Hypermetabolic bone lesions with decrease in level of activity.  No new findings.  · PET scan 3/30/2023 with minimal/insignificant increase in activity in multiple bone lesions.  1 new focus of hypermetabolic activity left anterior acetabulum (SUV 10.7) however previously identified activity anterolateral left acetabulum is no longer present.  Subcutaneous fluid collection lateral right anterior chest wall nearly completely resolved.  · Due to continued clinical benefit from Faslodex/abemaciclib and with only 1 potential new finding in left acetabulum on PET scan, elected to continue current treatment at visit on 4/13/2023 and pursue MRI pelvis to evaluate left acetabulum with potential pursuit of radiation to solitary new left acetabular lesion.  · Abemaciclib held 4/13/2023 due to neutropenia (ANC 0.95) and thrombocytopenia (platelet count 93,000).  Plan to resume abemaciclib pending recovery of counts at reduced dose 150 mg a.m.,  100 mg p.m.  · Patient reviewed back today for close follow-up with abemaciclib being held the last week.  Today WBC up to 2.2, ANC 1.27.  Platelets 98,000.  Hemoglobin is dropped to 6.1 and we will pursue transfusion 2 units PRBCs.  Per discussion with Dr. Irvin we will continue to have patient hold abemaciclib for the next week.  I will see her back in 1 week for review and if WBC/ANC further improved and acceptable we will have patient resume abemaciclib at lower dose specifically of 150 mg in the morning, 100 mg in the evening.  [facility] has been notified of this upcoming planned change.     *Anemia secondary to ESRD, underlying malignancy/chronic disease, myelosuppression from CDK 4/6 inhibitor, GI blood loss:  · Anemia secondary to ESRD, malignancy with exacerbation by use of Ibrance  · Previous evidence of folate deficiency 8/12/2019 with level 3.84, initiated folic acid 1 mg daily  · Previous evidence of iron deficiency, received Injectafer on 8/7/2020 750 mg IV x1 dose.  Second dose not administered due to onset of fever, subsequent iron studies precluded further administration of IV iron.  · Previous low normal B12 level initiated oral B12 1000 mcg daily.  · Patient had previously received Procrit and required intermittent transfusion support  · Following initiation of hemodialysis, management of BEAU transitioned to nephrology, receiving Epogen with dialysis.  · Ongoing transfusion support prompted alteration in Ibrance dosing on 8/23/2021.  · After alteration in Ibrance dosing, hemoglobin improved and remained stable in the 9-10 range.  · Improved but ongoing intermittent need for transfusion support despite Ibrance dose alteration  · Stool negative for occult blood x3 on 11/2/2021  · Patient with reduced dialysis frequency to 2 days/week with concurrent decrease in Epogen dosing corresponding again to increased transfusion requirement.  · Epogen dose increased per nephrology to 20,000 units twice weekly with  dialysis on Mondays and Fridays  · Ibrance again exacerbating anemia with ongoing intermittent transfusion requirements.  Ibrance subsequently discontinued on 7/28/2022 and patient initiated abemaciclib on 8/4/2022 which may be less myelosuppressive.  · Additional transfusions required with hemoglobin on 3/31/2022 6.6, hemoglobin 4/21/2022 of 6.4, hemoglobin on 5/5/2022 of 6.8, hemoglobin 6.4 on 7/14/2022, hemoglobin 6.4 on 7/28/2022, hemoglobin 6.8 on 8/18/2022.  · Patient did develop transient visible blood in stool in July 2022  · Anemia evaluation 7/28/2022 with iron 32, ferritin 412, iron saturation 15%, TIBC 210, folate 19.9, B12 925, haptoglobin 300, .  · Stool positive for occult blood x3 on 8/1/2022  · Patient was seen by GI on 8/16/2022, felt to be high risk for endoscopic evaluation and elected to forego EGD/colonoscopy for now  · Patient transitioned from hemodialysis in clinic to home hemodialysis 5 days/week x 2 hours beginning 8/29/2022.  With transition to home dialysis, nephrology transition the patient from Epogen to Mircera administered every 4 weeks in their office, initiated 8/22/2022.  · Symptomatic hemoglobin 7.3 on 2/16/2023, received transfusion  · Additional labs on 3/16/2023 with B12 1146, folate greater than 20  · Hemoglobin today down to 6.1.  Patient does receive Mircera every 4 weeks with nephrology, just given 4/18/2023.  We will pursue transfusion 2 units PRBCs.    *ESRD on HD:  · Patient with previous CKD3/4  · Hospitalization 4/29-5/4/2021 with RYAN/CKD, UTI, anemia.  Required initiation of hemodialysis Tuesday Thursday Saturday.   · Decrease in frequency of dialysis to twice per week.  She continues to produce a significant amount of urine.   · Status post left upper extremity AV fistula placement on 11/3/2021 by vascular surgery  · Attempt to hold further dialysis on 1/11/2022 with close monitoring of her laboratory and clinical parameters.  Dialysis quickly resumed due to  development of hyperkalemia.  · Patient was undergoing dialysis 2 days/week on Mondays and Fridays.    · On 8/22/2022 patient transitioned to use of home dialysis device 5 days/week x 2 hours.  · Last week Dr. Irvin discussed with Dr. Antonio regarding obtaining pelvic MRI with contrast and he felt this would be acceptable if patient has dialysis at home just after the scan.    *CHF with severe aortic stenosis:  · Echocardiogram 7/12/2019 showing ejection fraction 48% with moderate dilation of the LV cavity, global hypokinesis, grade 2 diastolic dysfunction, mild to moderate aortic stenosis, trivial pericardial effusion.  · Echocardiogram 7/19/2021 with ejection fraction 56%, left atrial volume moderately increased, grade 2 diastolic dysfunction, severe calcification aortic valve, moderate aortic stenosis, severe mitral calcification, mild mitral stenosis, marked elevation in RVSP from tricuspid regurgitation.  · Echocardiogram on 7/13/2022 with ejection fraction 56-60%, grade 2 diastolic dysfunction, severe aortic stenosis.    · Cardiac catheterization 8/23/2022 showed normal coronary arteries, mild to moderate pulmonary hypertension.    · She was evaluated by cardiothoracic surgery Dr. Pagan and there was discussion regarding potential candidacy for TAVR although there was concern given her underlying comorbidities including her metastatic breast cancer.  I discussed patient's prognosis with Dr. Pagan.  She does have opportunities for additional treatment of her malignancy with intravenous chemotherapy and is willing to pursue this in the future when needed.  It is unclear as to her expected survival however given her multiple severe comorbidities with metastatic breast cancer, ESRD on dialysis, severe aortic stenosis, severe anemia.  There is consideration of possible pursuit of balloon valvuloplasty initially to assess her symptomatic response and then consider pursuit of TAVR in the future.   · Patient did undergo  aortic valvuloplasty during admission 1/10 - 1/11/2023.    *Left upper and bilateral lower extremity peripheral neuropathy:  · Patient reports that left upper extremity neuropathy was not present from previous chemotherapy but occurred after hospitalization that required intubation and critical care stay.  Apparently felt to represent critical care neuropathy.  Improved over time.  · Bilateral lower extremity neuropathy felt to be related to diabetes  · May have future implications regarding treatment for breast cancer.  · Patient reports that peripheral neuropathy symptoms continue in the bilateral lower extremities, unchanged.  This will be a consideration with potential future use of Taxol    *Uterine/endometrial activity on PET scan:  · Patient experienced postmenopausal vaginal bleeding in 2013, negative endometrial biopsy and gynecologic evaluation.  · With findings on PET scan with enlarging uterus and some hypermetabolic activity, referred back to Dr. Oliveros in gynecology.    · 9/19/2019 D&C with hysteroscopy by Dr. Oliveros, no significant intraoperative findings, pathologic results with benign findings, no evidence of malignancy.    *Nausea:  · Mild, relieved with Zofran.   · Patient experienced improvement in nausea after initiating hemodialysis  · No recent nausea    *Myelosuppression secondary to CDK 4/6 inhibitor:  · Patient was able to maintain adequate WBC after dosing alteration on Ibrance to treatment at 100 mg daily for 7 days on followed by 7 days off.  · Subsequent transition from Ibrance to abemaciclib  · Overall counts improved on abemaciclib  · Per above, patient noted 4/13/2023 with gradual decline of counts on abemaciclib prompting therapy to be held.      *Depression  · Patient with history of depression, worsened after the death of her son in November 2021  · With evidence of progressive malignancy in November 2021, patient agreeable to referral to supportive oncology  · Patient currently  receiving gabapentin 300 mg nightly, Zoloft 150 mg daily  · Patient does continue with difficulties regarding depression that wax and wane.  Currently symptoms under fair control.  Patient was previously seeing supportive oncology.  She does not feel that she needs further follow-up at this time however would not rule out returning to see supportive oncology in the future if needed.  We will plan to prescribe her Zoloft at this point.  She remains off of armodafinil.    *Left perinephric stranding secondary to malignancy with hydronephrosis:  · CT 4/22/2021 showed interval enlargement of 2 staghorn calculi in the left kidney with persistent left perinephric stranding  · Hospitalization 4/29-5/4/2021 with RYAN/CKD, UTI, anemia.  Required 2 unit PRBC transfusion and initiated hemodialysis Tuesday Thursday Saturday.    · Discussion from urology regarding potential need for surgical procedure to address staghorn calculus left kidney  · CT scan 7/2/2021 with only 1 remaining left renal stone identified which had decreased in size.  Patient appears to have passed the other stone.  · As above, CT 10/26/2021 with more prominent left hydronephrosis and increase in left perinephric and periureteral stranding.  Felt to possibly be related to passage of recent stone versus partial obstruction secondary to debris or thrombus in the left ureter versus pyelonephritis.  Patient however with no clinical evidence of recent stone passage nor pyelonephritis. Malignancy felt to be the cause of CT changes around the left kidney at this point.   · Left ureteral stent replacement 12/16/2021  · PET scan 1/11/2022 with decrease in fat injection near left renal pelvis, stent in satisfactory position.  Mild residual hydronephrosis on the left.  · Ureteral stent replaced on 3/10/2022  · PET scan 4/19/2022 with no hydronephrosis, left ureteral stent in place  · PET scan 7/21/2022 with persistent left hydronephrosis, ureteral stent in place  · PET  scan 10/6/2022 with left nephroureteral stent in place, overall stable findings  · PET scan 12/29/2022 with left nephroureteral stent remaining in place, stable findings  · PET scan 3/30/2023 with left ureteral stent in place.  · Patient will be following up with Dr. Mckee in urology to discuss potential need for stent replacement.    *Right thyroid nodules  · Patient underwent prior thyroid biopsy by her report with benign findings many years ago, records not available  · On MRI cervical spine 11/18/2021, finding of 1.8 cm right thyroid nodule  · Thyroid ultrasound 11/26/2021 with right-sided thyroid nodules, 1 nodule was hypoechoic, solid measuring 2 cm with biopsy recommended.  · Thyroid ultrasound results reviewed with patient.  In light of her metastatic breast cancer, renal failure the significance of the thyroid nodule is felt to be much less.  We discussed possibility of thyroid biopsy however patient is inclined to forego this, especially since she has had a biopsy performed in the past with benign findings by her report.    · No hypermetabolic activity identified on PET scan 1/11/2022 in the neck    *Hospitalization 1/28-1/30/2022 due to COVID-19 infection and C. difficile colitis  · Patient fully vaccinated with 3 doses Moderna COVID-19 vaccine  · Patient developed symptoms 1/26/2022 with abrupt onset sinus congestion, mild cough, subsequently developed nausea and diarrhea on 1/27/2022.  Significant fatigue.  · Patient tested positive in emergency department on 1/28/2022 and was admitted  · Patient maintained O2 saturation in mid 90% range on room air  · Patient received remdesivir  · Patient was also found to be positive for C. difficile colitis, received course of oral vancomycin.  · Symptoms from both COVID-19 and C. difficile colitis ultimately resolved.    *Hypocalcemia  · Patient developed significant hypocalcemia after receiving Xgeva on 5/19/2022  · Calcium management per nephrology  · No further  Xgeva planned due to persistent significant hypocalcemia  · Calcium today 10.0    *Dysuria with gross hematuria  · Patient has been experiencing gross hematuria over the past week with some mild dysuria.  · She reports that she will be seeing Dr. Mckee in urology tomorrow.  She does have ureteral stent that remains in place  · Urinalysis today suspicious for UTI, urine culture pending.  We will go ahead and prescribe Levaquin, dosed for dialysis at 250 mg every 48 hours x 4 doses  · 4/20/2023 per review today, patient's urinary symptoms are better but not completely resolved.  We will refill Levaquin x1.    Plan:  1. Continue to hold abemaciclib.  2. Patient undergo transfusion 2 units PRBCs this week.  3. Refill Levaquin to 50 mg taken every 48 hours for another 4 doses for persistent urinary symptoms which are notably better but still present.  4. Continue folic acid 1 mg daily, B12 1000 mcg daily.  5. Patient continues with home dialysis device 5 days/week x 2 hours.  6. Last Mircera given 4/18/2023 through nephrology.  Patient also receives IV iron every 2 weeks as needed (none recently).  7. Return in 1 week for NP follow-up and review of counts.  If WBC/ANC further improved  8. Patient is scheduled for MRI of her pelvis in May 2023 to evaluate left acetabulum and has also been referred to radiation oncology, scheduled 5/12/2023 to discuss possible focal radiation to this area based on recent PET findings.  9. Patient otherwise return in 3 weeks for MD follow-up with CBC, CMP and patient will be due for cycle 19 Faslodex.    Patient continues on high risk medication requiring intensive monitoring.

## 2023-04-21 ENCOUNTER — SPECIALTY PHARMACY (OUTPATIENT)
Dept: PHARMACY | Facility: HOSPITAL | Age: 63
End: 2023-04-21
Payer: COMMERCIAL

## 2023-04-21 ENCOUNTER — HOSPITAL ENCOUNTER (OUTPATIENT)
Dept: INFUSION THERAPY | Facility: HOSPITAL | Age: 63
Discharge: HOME | End: 2023-04-21
Payer: COMMERCIAL

## 2023-04-21 VITALS
RESPIRATION RATE: 18 BRPM | DIASTOLIC BLOOD PRESSURE: 64 MMHG | SYSTOLIC BLOOD PRESSURE: 118 MMHG | TEMPERATURE: 98.2 F | HEART RATE: 64 BPM | OXYGEN SATURATION: 98 %

## 2023-04-21 DIAGNOSIS — Z79.899 HIGH RISK MEDICATION USE: ICD-10-CM

## 2023-04-21 DIAGNOSIS — N18.30 ANEMIA IN STAGE 3 CHRONIC KIDNEY DISEASE, UNSPECIFIED WHETHER STAGE 3A OR 3B CKD: ICD-10-CM

## 2023-04-21 DIAGNOSIS — D63.1 ANEMIA IN STAGE 3 CHRONIC KIDNEY DISEASE, UNSPECIFIED WHETHER STAGE 3A OR 3B CKD: ICD-10-CM

## 2023-04-21 PROCEDURE — 63710000001 DIPHENHYDRAMINE PER 50 MG: Performed by: NURSE PRACTITIONER

## 2023-04-21 PROCEDURE — P9016 RBC LEUKOCYTES REDUCED: HCPCS

## 2023-04-21 PROCEDURE — 36430 TRANSFUSION BLD/BLD COMPNT: CPT

## 2023-04-21 PROCEDURE — 86900 BLOOD TYPING SEROLOGIC ABO: CPT

## 2023-04-21 RX ORDER — ACETAMINOPHEN 325 MG/1
650 TABLET ORAL ONCE
Status: COMPLETED | OUTPATIENT
Start: 2023-04-21 | End: 2023-04-21

## 2023-04-21 RX ORDER — DIPHENHYDRAMINE HCL 25 MG
25 CAPSULE ORAL ONCE
Status: COMPLETED | OUTPATIENT
Start: 2023-04-21 | End: 2023-04-21

## 2023-04-21 RX ORDER — SODIUM CHLORIDE 9 MG/ML
250 INJECTION, SOLUTION INTRAVENOUS AS NEEDED
Status: DISCONTINUED | OUTPATIENT
Start: 2023-04-21 | End: 2023-04-23 | Stop reason: HOSPADM

## 2023-04-21 RX ADMIN — ACETAMINOPHEN 650 MG: 325 TABLET, FILM COATED ORAL at 08:43

## 2023-04-21 RX ADMIN — DIPHENHYDRAMINE HYDROCHLORIDE 25 MG: 25 CAPSULE ORAL at 08:43

## 2023-04-21 NOTE — PROGRESS NOTES
Called and spoke with representative at [facility] specialty.  The pharmacy had already shipped the 150 mg to the pt.  The 100 mg is ready to be scheduled.  The pt said she would call and schedule delivery before she needs it next Thursday.     Juana Borrego  Specialty Pharmacy Technician

## 2023-04-27 ENCOUNTER — LAB (OUTPATIENT)
Dept: LAB | Facility: HOSPITAL | Age: 63
End: 2023-04-27
Payer: COMMERCIAL

## 2023-04-27 ENCOUNTER — OFFICE VISIT (OUTPATIENT)
Dept: ONCOLOGY | Facility: CLINIC | Age: 63
End: 2023-04-27
Payer: COMMERCIAL

## 2023-04-27 VITALS
HEART RATE: 79 BPM | HEIGHT: 67 IN | RESPIRATION RATE: 18 BRPM | OXYGEN SATURATION: 99 % | WEIGHT: 293 LBS | TEMPERATURE: 98.2 F | SYSTOLIC BLOOD PRESSURE: 104 MMHG | BODY MASS INDEX: 45.99 KG/M2 | DIASTOLIC BLOOD PRESSURE: 67 MMHG

## 2023-04-27 DIAGNOSIS — D61.810 PANCYTOPENIA DUE TO ANTINEOPLASTIC CHEMOTHERAPY: ICD-10-CM

## 2023-04-27 DIAGNOSIS — D63.1 ANEMIA IN STAGE 3 CHRONIC KIDNEY DISEASE, UNSPECIFIED WHETHER STAGE 3A OR 3B CKD: ICD-10-CM

## 2023-04-27 DIAGNOSIS — C50.919 PRIMARY MALIGNANT NEOPLASM OF BREAST WITH METASTASIS: Primary | ICD-10-CM

## 2023-04-27 DIAGNOSIS — T45.1X5A PANCYTOPENIA DUE TO ANTINEOPLASTIC CHEMOTHERAPY: ICD-10-CM

## 2023-04-27 DIAGNOSIS — N18.30 ANEMIA IN STAGE 3 CHRONIC KIDNEY DISEASE, UNSPECIFIED WHETHER STAGE 3A OR 3B CKD: ICD-10-CM

## 2023-04-27 DIAGNOSIS — Z79.899 HIGH RISK MEDICATION USE: ICD-10-CM

## 2023-04-27 LAB
BASOPHILS # BLD AUTO: 0.05 10*3/MM3 (ref 0–0.2)
BASOPHILS NFR BLD AUTO: 1.7 % (ref 0–1.5)
DEPRECATED RDW RBC AUTO: 78.1 FL (ref 37–54)
EOSINOPHIL # BLD AUTO: 0.18 10*3/MM3 (ref 0–0.4)
EOSINOPHIL NFR BLD AUTO: 6 % (ref 0.3–6.2)
ERYTHROCYTE [DISTWIDTH] IN BLOOD BY AUTOMATED COUNT: 19.4 % (ref 12.3–15.4)
HCT VFR BLD AUTO: 23.2 % (ref 34–46.6)
HGB BLD-MCNC: 7.3 G/DL (ref 12–15.9)
IMM GRANULOCYTES # BLD AUTO: 0.03 10*3/MM3 (ref 0–0.05)
IMM GRANULOCYTES NFR BLD AUTO: 1 % (ref 0–0.5)
LYMPHOCYTES # BLD AUTO: 0.6 10*3/MM3 (ref 0.7–3.1)
LYMPHOCYTES NFR BLD AUTO: 19.9 % (ref 19.6–45.3)
MCH RBC QN AUTO: 35.3 PG (ref 26.6–33)
MCHC RBC AUTO-ENTMCNC: 31.5 G/DL (ref 31.5–35.7)
MCV RBC AUTO: 112.1 FL (ref 79–97)
MONOCYTES # BLD AUTO: 0.41 10*3/MM3 (ref 0.1–0.9)
MONOCYTES NFR BLD AUTO: 13.6 % (ref 5–12)
NEUTROPHILS NFR BLD AUTO: 1.75 10*3/MM3 (ref 1.7–7)
NEUTROPHILS NFR BLD AUTO: 57.8 % (ref 42.7–76)
NRBC BLD AUTO-RTO: 0 /100 WBC (ref 0–0.2)
PLATELET # BLD AUTO: 88 10*3/MM3 (ref 140–450)
PMV BLD AUTO: 10.8 FL (ref 6–12)
RBC # BLD AUTO: 2.07 10*6/MM3 (ref 3.77–5.28)
WBC NRBC COR # BLD: 3.02 10*3/MM3 (ref 3.4–10.8)

## 2023-04-27 PROCEDURE — 85025 COMPLETE CBC W/AUTO DIFF WBC: CPT

## 2023-04-27 PROCEDURE — 36415 COLL VENOUS BLD VENIPUNCTURE: CPT

## 2023-04-27 NOTE — PROGRESS NOTES
"Chief Complaint  Metastatic lobular breast cancer (ER/[place] positive, HER-2/tj negative), anemia secondary to CKD3, CHF, peripheral neuropathy, chemotherapy related nausea chemotherapy-induced myelosuppression    Subjective        History of Present Illness  Patient is seen back today and continue close follow-up.  2 weeks ago when here her ANC was down to 0.9, platelets 93,000.  We asked her to hold her abemaciclib.  Last week she had slight improvement in counts with WBC up to 2.2, ANC 1.2, platelets 98,000.  We asked her to continue to hold a basically.  We did send her for transfusion of 2 units PRBCs for hemoglobin of 6.1.    All that to say as she is reviewed back today hemoglobin is marginally improved at 7.3.  Platelets 88,000.  WBC 3.0, ANC 1.75.  Patient states that her new dosing for abemaciclib has been shipped but will not arrive until next Wednesday, 5/3/2023.  Per discussion with Dr. Irvin and relayed to the patient we will go ahead and ask her to start back on abemaciclib 150 mg in the morning with tentative plans to add to 100 mg in the evening once it arrives next week.  Because of her borderline counts as always we will recheck a CBC next week.      She does have urological procedure for stent placement next Tuesday because her previous one reportedly fell out.  She is noting hematuria consequently.    She denies other concerns at this time.    Objective   Vital Signs:   /67   Pulse 79   Temp 98.2 °F (36.8 °C) (Temporal)   Resp 18   Ht 170.2 cm (67.01\")   Wt (!) 166 kg (365 lb 15.4 oz)   SpO2 99%   BMI 57.30 kg/m²     Physical Exam  Constitutional:       Appearance: She is well-developed. She is obese.      Comments: Patient is in a motorized scooter today   Eyes:      Conjunctiva/sclera: Conjunctivae normal.   Neck:      Thyroid: No thyromegaly.   Cardiovascular:      Rate and Rhythm: Normal rate and regular rhythm.      Heart sounds: Murmur heard.     No friction rub. No gallop.      " Comments: 2/6 murmur  Pulmonary:      Effort: No respiratory distress.      Breath sounds: Normal breath sounds.   Abdominal:      General: Bowel sounds are normal. There is no distension.      Palpations: Abdomen is soft.      Tenderness: There is no abdominal tenderness.   Musculoskeletal:         General: Swelling present.      Comments: 1+ bilateral lower extremity edema with venous stasis changes noted.  Left upper extremity fistula   Lymphadenopathy:      Head:      Right side of head: No submandibular adenopathy.      Cervical: No cervical adenopathy.      Upper Body:      Right upper body: No supraclavicular adenopathy.      Left upper body: No supraclavicular adenopathy.   Skin:     General: Skin is warm and dry.      Findings: No bruising or rash.   Neurological:      Mental Status: She is alert and oriented to person, place, and time.      Cranial Nerves: No cranial nerve deficit.      Motor: No abnormal muscle tone.      Deep Tendon Reflexes: Reflexes normal.   Psychiatric:         Behavior: Behavior normal.        I have reexamined the patient and the results are consistent with the previously documented exam. Ansley Flores, APRN       Result Review :  Results from last 7 days   Lab Units 04/27/23  1336   WBC 10*3/mm3 3.02*   NEUTROS ABS 10*3/mm3 1.75   HEMOGLOBIN g/dL 7.3*   HEMATOCRIT % 23.2*   PLATELETS 10*3/mm3 88*                          Assessment and Plan    *Metastatic lobular breast cancer (ER/[place] positive, HER-2/tj negative):  · Previous stage IIIC right breast cancer in 2003, received neoadjuvant chemotherapy (unknown regimen) with subsequent bilateral mastectomies 1/22/2004 with right axillary dissection.  Residual 10-12 cm invasive lobular carcinoma on the right breast with 14/16+ nodes with extranodal extension.  Received adjuvant carboplatin and Navelbine chemotherapy x4 cycles  · Attempted adjuvant radiation to the chest wall however interrupted due to cellulitis that required  removal of implants in August 2004  · Received tamoxifen from 6/22/2004 until June 2009.  Transitioned to Femara and received until June 2014  · Identification of CT findings concerning for carcinomatosis on CT scans and June 2019.  · PET scan confirmed hypermetabolic activity in the omentum as well as small retroperitoneal lymph nodes.  · CT-guided omental biopsy 7/30/2019 with metastatic lobular carcinoma of breast primary, ER positive (greater than 95%), UT positive (90%), HER-2/tj negative (1+ IHC)  · [facility] liquid biopsy 2/5/2020: MSI undetermined, mutations in BETH, NF1, CHEK2.  No evidence of PIK3CA mutation.  · Subsequent [facility] germline testing 11/12/2021 with BETH VUS (c.2864C>A) and POLE VUS (c.631A>T)  · Initiation of Aromasin and Ibrance in August 2019  · Difficulty with myelosuppression related to Ibrance requiring dose alterations, eventually changed to 100 mg for 7 days on followed by 7 days off.  · CT chest abdomen pelvis 10/26/2021 with increase in left hydronephrosis with increase in left perinephric and periureteral stranding. Decrease in stone in the left kidney lower pole (7 mm).  Stable small retroperitoneal lymph and left periaortic lymph nodes.  · PET scan 11/10/2021 with multiple bilateral hypermetabolic nodular pulmonary lesions, asymmetric moderate to severe left hydronephrosis with ill-defined fat stranding and soft tissue thickening in the renal sinus and surrounding the left ureter, hypermetabolic left retroperitoneal lymph node with slight increase in size, ill-defined hypermetabolic thickening in the inferior omentum with increase in size, uptake and C7 (indeterminate, recommended MRI).  Short segments of uptake in the mid and distal esophagus possibly related to gastritis.  · PET scan findings felt to be consistent with progressive malignancy.  Patient declined repeat omental biopsy.   · Options for further treatment discussed on 11/12/2021.  In light of renal failure  and dialysis, options are more limited.  Recommendation to continue Ibrance and discontinue Aromasin, begin Faslodex.  Discussed possible future use of single agent Taxol.    · Aromasin discontinued 11/12/2021  · On 11/22/2021 initiation of Faslodex with continuation of Ibrance (100 mg daily for 7 days on followed by 7 days off).  · MRI cervical spine 11/18/2021 showed the right C7 normality to likely represent metastatic disease.  With solitary bone lesion, did not recommend pursuit of bone modifying agent.  · Following 2 cycles Faslodex/Ibrance, PET scan on 1/11/2022 showed no change in the soft tissue superior to the dome of the bladder with hypermetabolic activity (likely related to carcinomatosis).  Significant decrease in injection of fat around the left renal pelvis and stable retroperitoneal hypermetabolic lymph nodes, decrease in size and activity in small bilateral pulmonary nodules.  Findings felt to represent evidence of response to treatment.    · Ibrance held briefly beginning 1/28/2022 due to hospitalization with COVID-19 infection and C. difficile colitis.  Patient developed leukopenia/neutropenia.  Ibrance resumed at previous dosing (100 mg daily 7 days on followed by 7 days off) on 2/9/22.  · Following 5 cycles Faslodex/Ibrance PET scan 4/19/2022 with evidence of mixed response.  New hypermetabolic lesion spinous process L2 (SUV 5.1) and slight increase in activity left clavicular metastasis (SUV increased 4.6-8.1).  C7 hypermetabolic mixed lytic and blastic bone lesion was unchanged.  Decrease in uptake involving omental thickening.  Stable soft tissue thickening around the left renal pelvis and ureter.  Discussion again regarding potential pursuit of IV chemotherapy with Taxol versus continuation of Ibrance and Faslodex with radiation to sites of bony disease at L2, left clavicle, C7.  Patient opted to defer initiation of systemic chemotherapy and continue Ibrance/Faslodex with consideration of  radiation.  · Initiated monthly Xgeva on 5/19/2022 (cleared with nephrology).  Xgeva subsequently held due to significant hypocalcemia.  Decision made to forego further Xgeva with persistent hypocalcemia.  · Palliative radiation received to lumbar spine regarding L2 metastasis 5/23- 6/7/2022  · Ibrance held during radiation therapy beginning 5/22/2022.  Ibrance resumed 6/16/2022.  · PET scan 7/21/2022 with stable hypermetabolic abdominal pelvic lymph nodes and subcarinal lymph node, stable bone lesions at C7, left clavicle.  Stable soft tissue thickening around the left renal pelvis with left hydronephrosis.  Uptake in adrenal glands felt to be likely reactive (no change in size).  No activity noted at L2 (previously radiated).  New hypermetabolic lesion right anterior sixth rib.  · With evidence of further slight progression in bone on PET scan (new right sixth rib lesion), on 7/28/2022 discontinued Ibrance and plan to transition to abemaciclib with continuation of Faslodex.    · On 8/4/2022 initiated abemaciclib at reduced dose of 50 mg twice daily.  · On 8/25/2022, increased abemaciclib dose to 100 mg twice daily  · PET scan 10/6/2022 with stable findings with exception of left acetabular activity report noted new activity however on prior PET scan question of focal activity present previously.  No corresponding CT abnormality and significant increase in SUV at sacral lesion.  Scan otherwise stable.  Decision to continue current treatment  · Abemaciclib dose increased on 10/13/2022 up to 150 mg twice daily  · PET scan 12/29/2022 with artifactual accumulation of tracer right axilla and right mastectomy bed without visualized CT abnormality.  Hypermetabolic bone lesions with decrease in level of activity.  No new findings.  · PET scan 3/30/2023 with minimal/insignificant increase in activity in multiple bone lesions.  1 new focus of hypermetabolic activity left anterior acetabulum (SUV 10.7) however previously  identified activity anterolateral left acetabulum is no longer present.  Subcutaneous fluid collection lateral right anterior chest wall nearly completely resolved.  · Due to continued clinical benefit from Faslodex/abemaciclib and with only 1 potential new finding in left acetabulum on PET scan, elected to continue current treatment at visit on 4/13/2023 and pursue MRI pelvis to evaluate left acetabulum with potential pursuit of radiation to solitary new left acetabular lesion.  · Abemaciclib held 4/13/2023 due to neutropenia (ANC 0.95) and thrombocytopenia (platelet count 93,000).  Plan to resume abemaciclib pending recovery of counts at reduced dose 150 mg a.m., 100 mg p.m.  · 4/20/2023 WBC up to 2.2, ANC 1.27.  Platelets 98,000.  Hemoglobin 6.1. Pursue transfusion 2 units PRBCs. Per discussion with Dr. Irvin we will continue to have patient hold abemaciclib for the next week.  When we do asked patient to resume therapy we will pursue lower dose abemaciclib specifically 150 mg in the morning, 100 mg in the evening.    · 4/27/2023 WBC today 3.02, ANC 1.75, ANC 7.3, platelets 88,000.  Per discussion with Dr. Irvin we will go ahead and have the patient restart abemaciclib.  She will not have her new dosing of the 100 mg tablets until next week, 5/3/2023.  In the meantime she will restart abemaciclib 150 mg in the morning.  We will repeat a CBC on Wednesday, 5/3/2023 and barring any significant change in her counts we will add to 100 mg in the evening as this is the day the medication will arrive.  She verbalized understanding of the plan.    *Anemia secondary to ESRD, underlying malignancy/chronic disease, myelosuppression from CDK 4/6 inhibitor, GI blood loss:  · Anemia secondary to ESRD, malignancy with exacerbation by use of Ibrance  · Previous evidence of folate deficiency 8/12/2019 with level 3.84, initiated folic acid 1 mg daily  · Previous evidence of iron deficiency, received Injectafer on 8/7/2020 750 mg IV  x1 dose.  Second dose not administered due to onset of fever, subsequent iron studies precluded further administration of IV iron.  · Previous low normal B12 level initiated oral B12 1000 mcg daily.  · Patient had previously received Procrit and required intermittent transfusion support  · Following initiation of hemodialysis, management of BEAU transitioned to nephrology, receiving Epogen with dialysis.  · Ongoing transfusion support prompted alteration in Ibrance dosing on 8/23/2021.  · After alteration in Ibrance dosing, hemoglobin improved and remained stable in the 9-10 range.  · Improved but ongoing intermittent need for transfusion support despite Ibrance dose alteration  · Stool negative for occult blood x3 on 11/2/2021  · Patient with reduced dialysis frequency to 2 days/week with concurrent decrease in Epogen dosing corresponding again to increased transfusion requirement.  · Epogen dose increased per nephrology to 20,000 units twice weekly with dialysis on Mondays and Fridays  · Ibrance again exacerbating anemia with ongoing intermittent transfusion requirements.  Ibrance subsequently discontinued on 7/28/2022 and patient initiated abemaciclib on 8/4/2022 which may be less myelosuppressive.  · Additional transfusions required with hemoglobin on 3/31/2022 6.6, hemoglobin 4/21/2022 of 6.4, hemoglobin on 5/5/2022 of 6.8, hemoglobin 6.4 on 7/14/2022, hemoglobin 6.4 on 7/28/2022, hemoglobin 6.8 on 8/18/2022.  · Patient did develop transient visible blood in stool in July 2022  · Anemia evaluation 7/28/2022 with iron 32, ferritin 412, iron saturation 15%, TIBC 210, folate 19.9, B12 925, haptoglobin 300, .  · Stool positive for occult blood x3 on 8/1/2022  · Patient was seen by GI on 8/16/2022, felt to be high risk for endoscopic evaluation and elected to forego EGD/colonoscopy for now  · Patient transitioned from hemodialysis in clinic to home hemodialysis 5 days/week x 2 hours beginning 8/29/2022.  With  transition to home dialysis, nephrology transition the patient from Epogen to Mircera administered every 4 weeks in their office, initiated 8/22/2022.  · Symptomatic hemoglobin 7.3 on 2/16/2023, received transfusion  · Additional labs on 3/16/2023 with B12 1146, folate greater than 20  · 4/20/2023 Hgb 6.1.  Transfuse 2 units.  Received  · 4/27/2023 hemoglobin up to 7.3.  Patient is noting some associated hematuria with recent urological stent falling out.    *ESRD on HD:  · Patient with previous CKD3/4  · Hospitalization 4/29-5/4/2021 with RYAN/CKD, UTI, anemia.  Required initiation of hemodialysis Tuesday Thursday Saturday.   · Decrease in frequency of dialysis to twice per week.  She continues to produce a significant amount of urine.   · Status post left upper extremity AV fistula placement on 11/3/2021 by vascular surgery  · Attempt to hold further dialysis on 1/11/2022 with close monitoring of her laboratory and clinical parameters.  Dialysis quickly resumed due to development of hyperkalemia.  · Patient was undergoing dialysis 2 days/week on Mondays and Fridays.    · On 8/22/2022 patient transitioned to use of home dialysis device 5 days/week x 2 hours.  · Recently Dr. Irvin discussed with Dr. Antonio regarding obtaining pelvic MRI with contrast and he felt this would be acceptable if patient has dialysis at home just after the scan.    *CHF with severe aortic stenosis:  · Echocardiogram 7/12/2019 showing ejection fraction 48% with moderate dilation of the LV cavity, global hypokinesis, grade 2 diastolic dysfunction, mild to moderate aortic stenosis, trivial pericardial effusion.  · Echocardiogram 7/19/2021 with ejection fraction 56%, left atrial volume moderately increased, grade 2 diastolic dysfunction, severe calcification aortic valve, moderate aortic stenosis, severe mitral calcification, mild mitral stenosis, marked elevation in RVSP from tricuspid regurgitation.  · Echocardiogram on 7/13/2022 with  ejection fraction 56-60%, grade 2 diastolic dysfunction, severe aortic stenosis.    · Cardiac catheterization 8/23/2022 showed normal coronary arteries, mild to moderate pulmonary hypertension.    · She was evaluated by cardiothoracic surgery Dr. Pagan and there was discussion regarding potential candidacy for TAVR although there was concern given her underlying comorbidities including her metastatic breast cancer.  I discussed patient's prognosis with Dr. Pagan.  She does have opportunities for additional treatment of her malignancy with intravenous chemotherapy and is willing to pursue this in the future when needed.  It is unclear as to her expected survival however given her multiple severe comorbidities with metastatic breast cancer, ESRD on dialysis, severe aortic stenosis, severe anemia.  There is consideration of possible pursuit of balloon valvuloplasty initially to assess her symptomatic response and then consider pursuit of TAVR in the future.   · Patient did undergo aortic valvuloplasty during admission 1/10 - 1/11/2023.    *Left upper and bilateral lower extremity peripheral neuropathy:  · Patient reports that left upper extremity neuropathy was not present from previous chemotherapy but occurred after hospitalization that required intubation and critical care stay.  Apparently felt to represent critical care neuropathy.  Improved over time.  · Bilateral lower extremity neuropathy felt to be related to diabetes  · May have future implications regarding treatment for breast cancer.  · Patient reports that peripheral neuropathy symptoms continue in the bilateral lower extremities, unchanged.  This will be a consideration with potential future use of Taxol    *Uterine/endometrial activity on PET scan:  · Patient experienced postmenopausal vaginal bleeding in 2013, negative endometrial biopsy and gynecologic evaluation.  · With findings on PET scan with enlarging uterus and some hypermetabolic activity,  referred back to Dr. Oliveros in gynecology.    · 9/19/2019 D&C with hysteroscopy by Dr. Oliveros, no significant intraoperative findings, pathologic results with benign findings, no evidence of malignancy.    *Nausea:  · Mild, relieved with Zofran.   · Patient experienced improvement in nausea after initiating hemodialysis  · No recent nausea    *Myelosuppression secondary to CDK 4/6 inhibitor:  · Patient was able to maintain adequate WBC after dosing alteration on Ibrance to treatment at 100 mg daily for 7 days on followed by 7 days off.  · Subsequent transition from Ibrance to abemaciclib  · Overall counts improved on abemaciclib  · Per above, patient noted 4/13/2023 with gradual decline of counts on abemaciclib prompting therapy to be held.      *Depression  · Patient with history of depression, worsened after the death of her son in November 2021  · With evidence of progressive malignancy in November 2021, patient agreeable to referral to supportive oncology  · Patient currently receiving gabapentin 300 mg nightly, Zoloft 150 mg daily  · Patient does continue with difficulties regarding depression that wax and wane.  Currently symptoms under fair control.  Patient was previously seeing supportive oncology.  She does not feel that she needs further follow-up at this time however would not rule out returning to see supportive oncology in the future if needed.  We will plan to prescribe her Zoloft at this point.  She remains off of armodafinil.    *Left perinephric stranding secondary to malignancy with hydronephrosis:  · CT 4/22/2021 showed interval enlargement of 2 staghorn calculi in the left kidney with persistent left perinephric stranding  · Hospitalization 4/29-5/4/2021 with RYAN/CKD, UTI, anemia.  Required 2 unit PRBC transfusion and initiated hemodialysis Tuesday Thursday Saturday.    · Discussion from urology regarding potential need for surgical procedure to address staghorn calculus left kidney  · CT scan  7/2/2021 with only 1 remaining left renal stone identified which had decreased in size.  Patient appears to have passed the other stone.  · As above, CT 10/26/2021 with more prominent left hydronephrosis and increase in left perinephric and periureteral stranding.  Felt to possibly be related to passage of recent stone versus partial obstruction secondary to debris or thrombus in the left ureter versus pyelonephritis.  Patient however with no clinical evidence of recent stone passage nor pyelonephritis. Malignancy felt to be the cause of CT changes around the left kidney at this point.   · Left ureteral stent replacement 12/16/2021  · PET scan 1/11/2022 with decrease in fat injection near left renal pelvis, stent in satisfactory position.  Mild residual hydronephrosis on the left.  · Ureteral stent replaced on 3/10/2022  · PET scan 4/19/2022 with no hydronephrosis, left ureteral stent in place  · PET scan 7/21/2022 with persistent left hydronephrosis, ureteral stent in place  · PET scan 10/6/2022 with left nephroureteral stent in place, overall stable findings  · PET scan 12/29/2022 with left nephroureteral stent remaining in place, stable findings  · PET scan 3/30/2023 with left ureteral stent in place.  · Patient will be following up with Dr. Mckee in urology next week for stent replacement 5/2/2023    *Right thyroid nodules  · Patient underwent prior thyroid biopsy by her report with benign findings many years ago, records not available  · On MRI cervical spine 11/18/2021, finding of 1.8 cm right thyroid nodule  · Thyroid ultrasound 11/26/2021 with right-sided thyroid nodules, 1 nodule was hypoechoic, solid measuring 2 cm with biopsy recommended.  · Thyroid ultrasound results reviewed with patient.  In light of her metastatic breast cancer, renal failure the significance of the thyroid nodule is felt to be much less.  We discussed possibility of thyroid biopsy however patient is inclined to forego this,  especially since she has had a biopsy performed in the past with benign findings by her report.    · No hypermetabolic activity identified on PET scan 1/11/2022 in the neck    *Hospitalization 1/28-1/30/2022 due to COVID-19 infection and C. difficile colitis  · Patient fully vaccinated with 3 doses Moderna COVID-19 vaccine  · Patient developed symptoms 1/26/2022 with abrupt onset sinus congestion, mild cough, subsequently developed nausea and diarrhea on 1/27/2022.  Significant fatigue.  · Patient tested positive in emergency department on 1/28/2022 and was admitted  · Patient maintained O2 saturation in mid 90% range on room air  · Patient received remdesivir  · Patient was also found to be positive for C. difficile colitis, received course of oral vancomycin.  · Symptoms from both COVID-19 and C. difficile colitis ultimately resolved.    *Hypocalcemia  · Patient developed significant hypocalcemia after receiving Xgeva on 5/19/2022  · Calcium management per nephrology  · No further Xgeva planned due to persistent significant hypocalcemia  · Most recent calcium 10.0    *Dysuria with gross hematuria  · Patient has been experiencing gross hematuria over the past week with some mild dysuria.  · She reports that she will be seeing Dr. Mckee in urology tomorrow.  She does have ureteral stent that remains in place  · Urinalysis today suspicious for UTI, urine culture pending.  We will go ahead and prescribe Levaquin, dosed for dialysis at 250 mg every 48 hours x 4 doses  · 4/20/2023 per review today, patient's urinary symptoms are better but not completely resolved.  We will refill Levaquin x1.  ·     Plan:  1. Restart abemaciclib 150 mg in the morning only.  2. Patient undergo stent replacement with Dr. Mckee next week, 5-23.  3. She will return on the morning of 5/3/2023 for repeat CBC and as long as counts are stable we will have her add a basically 100 mg in the evening (this is the day her new pill dose is to  arrive).  Going forward she would then take abemaciclib 150 mg in the morning, 100 mg in the evening.  4. Continue folic acid 1 mg daily, B12 1000 mcg daily.  5. Patient continues with home dialysis device 5 days/week x 2 hours.  6. Last Mircera given 4/18/2023 through nephrology.  Patient also receives IV iron every 2 weeks as needed (none recently).  7. Patient is scheduled for MRI of her pelvis in May 2023 to evaluate left acetabulum and has also been referred to radiation oncology, scheduled 5/12/2023 to discuss possible focal radiation to this area based on recent PET findings.  8. Patient otherwise return in 3 weeks for MD follow-up with CBC, CMP and patient will be due for cycle 19 Faslodex.    Patient continues on high risk medication requiring intensive monitoring.

## 2023-05-03 ENCOUNTER — HOSPITAL ENCOUNTER (OUTPATIENT)
Dept: INFUSION THERAPY | Facility: HOSPITAL | Age: 63
Discharge: HOME | End: 2023-05-03
Payer: COMMERCIAL

## 2023-05-03 ENCOUNTER — LAB (OUTPATIENT)
Dept: LAB | Facility: HOSPITAL | Age: 63
End: 2023-05-03
Payer: COMMERCIAL

## 2023-05-03 ENCOUNTER — PRE-ADMISSION TESTING (OUTPATIENT)
Dept: PREADMISSION TESTING | Facility: HOSPITAL | Age: 63
End: 2023-05-03
Payer: COMMERCIAL

## 2023-05-03 VITALS
HEIGHT: 67 IN | WEIGHT: 293 LBS | SYSTOLIC BLOOD PRESSURE: 84 MMHG | BODY MASS INDEX: 45.99 KG/M2 | HEART RATE: 79 BPM | DIASTOLIC BLOOD PRESSURE: 56 MMHG | TEMPERATURE: 97.5 F | RESPIRATION RATE: 20 BRPM | OXYGEN SATURATION: 99 %

## 2023-05-03 DIAGNOSIS — D63.1 ANEMIA IN STAGE 3 CHRONIC KIDNEY DISEASE, UNSPECIFIED WHETHER STAGE 3A OR 3B CKD: Primary | ICD-10-CM

## 2023-05-03 DIAGNOSIS — N18.30 ANEMIA IN STAGE 3 CHRONIC KIDNEY DISEASE, UNSPECIFIED WHETHER STAGE 3A OR 3B CKD: Primary | ICD-10-CM

## 2023-05-03 DIAGNOSIS — C50.919 PRIMARY MALIGNANT NEOPLASM OF BREAST WITH METASTASIS: ICD-10-CM

## 2023-05-03 LAB
ABO GROUP BLD: NORMAL
ALBUMIN SERPL-MCNC: 2.9 G/DL (ref 3.5–5.2)
ALBUMIN/GLOB SERPL: 0.8 G/DL
ALP SERPL-CCNC: 94 U/L (ref 39–117)
ALT SERPL W P-5'-P-CCNC: 12 U/L (ref 1–33)
ANION GAP SERPL CALCULATED.3IONS-SCNC: 9.9 MMOL/L (ref 5–15)
AST SERPL-CCNC: 16 U/L (ref 1–32)
BASOPHILS # BLD AUTO: 0.05 10*3/MM3 (ref 0–0.2)
BASOPHILS NFR BLD AUTO: 1.7 % (ref 0–1.5)
BILIRUB SERPL-MCNC: 0.4 MG/DL (ref 0–1.2)
BLD GP AB SCN SERPL QL: NEGATIVE
BUN SERPL-MCNC: 29 MG/DL (ref 8–23)
BUN/CREAT SERPL: 6.1 (ref 7–25)
CALCIUM SPEC-SCNC: 9 MG/DL (ref 8.6–10.5)
CHLORIDE SERPL-SCNC: 102 MMOL/L (ref 98–107)
CO2 SERPL-SCNC: 27.1 MMOL/L (ref 22–29)
CREAT SERPL-MCNC: 4.77 MG/DL (ref 0.57–1)
DEPRECATED RDW RBC AUTO: 58.1 FL (ref 37–54)
EGFRCR SERPLBLD CKD-EPI 2021: 9.7 ML/MIN/1.73
EOSINOPHIL # BLD AUTO: 0.43 10*3/MM3 (ref 0–0.4)
EOSINOPHIL NFR BLD AUTO: 14.6 % (ref 0.3–6.2)
ERYTHROCYTE [DISTWIDTH] IN BLOOD BY AUTOMATED COUNT: 15 % (ref 12.3–15.4)
GLOBULIN UR ELPH-MCNC: 3.6 GM/DL
GLUCOSE SERPL-MCNC: 202 MG/DL (ref 65–99)
HCT VFR BLD AUTO: 19.3 % (ref 34–46.6)
HGB BLD-MCNC: 6.3 G/DL (ref 12–15.9)
HOLD SPECIMEN: NORMAL
LYMPHOCYTES # BLD AUTO: 0.62 10*3/MM3 (ref 0.7–3.1)
LYMPHOCYTES NFR BLD AUTO: 21 % (ref 19.6–45.3)
MCH RBC QN AUTO: 34.8 PG (ref 26.6–33)
MCHC RBC AUTO-ENTMCNC: 32.6 G/DL (ref 31.5–35.7)
MCV RBC AUTO: 106.6 FL (ref 79–97)
MONOCYTES # BLD AUTO: 0.28 10*3/MM3 (ref 0.1–0.9)
MONOCYTES NFR BLD AUTO: 9.5 % (ref 5–12)
NEUTROPHILS NFR BLD AUTO: 1.55 10*3/MM3 (ref 1.7–7)
NEUTROPHILS NFR BLD AUTO: 52.5 % (ref 42.7–76)
PLATELET # BLD AUTO: 135 10*3/MM3 (ref 140–450)
PMV BLD AUTO: 10 FL (ref 6–12)
POTASSIUM SERPL-SCNC: 4.1 MMOL/L (ref 3.5–5.2)
PROT SERPL-MCNC: 6.5 G/DL (ref 6–8.5)
RBC # BLD AUTO: 1.81 10*6/MM3 (ref 3.77–5.28)
RH BLD: POSITIVE
SODIUM SERPL-SCNC: 139 MMOL/L (ref 136–145)
T&S EXPIRATION DATE: NORMAL
WBC NRBC COR # BLD: 2.95 10*3/MM3 (ref 3.4–10.8)
WHOLE BLOOD HOLD COAG: NORMAL

## 2023-05-03 PROCEDURE — 86900 BLOOD TYPING SEROLOGIC ABO: CPT | Performed by: INTERNAL MEDICINE

## 2023-05-03 PROCEDURE — 86850 RBC ANTIBODY SCREEN: CPT | Performed by: INTERNAL MEDICINE

## 2023-05-03 PROCEDURE — 86922 COMPATIBILITY TEST ANTIGLOB: CPT

## 2023-05-03 PROCEDURE — 85025 COMPLETE CBC W/AUTO DIFF WBC: CPT

## 2023-05-03 PROCEDURE — 86901 BLOOD TYPING SEROLOGIC RH(D): CPT | Performed by: INTERNAL MEDICINE

## 2023-05-03 PROCEDURE — 86920 COMPATIBILITY TEST SPIN: CPT

## 2023-05-03 RX ORDER — LAMOTRIGINE 25 MG/1
TABLET ORAL
COMMUNITY

## 2023-05-03 RX ORDER — SODIUM CHLORIDE 9 MG/ML
250 INJECTION, SOLUTION INTRAVENOUS AS NEEDED
Status: CANCELLED | OUTPATIENT
Start: 2023-05-04

## 2023-05-03 RX ORDER — SODIUM CHLORIDE 9 MG/ML
250 INJECTION, SOLUTION INTRAVENOUS AS NEEDED
OUTPATIENT
Start: 2023-05-04

## 2023-05-03 NOTE — TELEPHONE ENCOUNTER
Caller: Juana Hall    Relationship to patient: Self    Best call back number: [phone]    Chief complaint: PT HAD LABS DRAWN AT THE HOSPITAL ON 5/3/2023    Type of visit: LAB    Requested date: DOESN'T NEED TO R/S     If rescheduling, when is the original appointment: 5/3/2023

## 2023-05-03 NOTE — DISCHARGE INSTRUCTIONS
Take the following medications the morning of surgery with a small sip of water:    levothyroxine    If you are on prescription narcotic pain medication to control your pain you may also take that medication the morning of surgery.    General Instructions:  Do not eat or drink anything after midnight the night before surgery.  Infants may have breast milk up to four hours before surgery.  Infants drinking formula may drink formula up to six hours before surgery.   Patients who avoid smoking, chewing tobacco and alcohol for 4 weeks prior to surgery have a reduced risk of post-operative complications.  Quit smoking as many days before surgery as you can.  Do not smoke, use chewing tobacco or drink alcohol the day of surgery.   If applicable bring your C-PAP/ BI-PAP machine in with you to preop day of surgery.  Bring any papers given to you in the doctor’s office.  Wear clean comfortable clothes.  Do not wear contact lenses, false eyelashes or make-up.  Bring a case for your glasses.   Bring crutches or walker if applicable.  Remove all piercings.  Leave jewelry and any other valuables at home.  Hair extensions with metal clips must be removed prior to surgery.  The Pre-Admission Testing nurse will instruct you to bring medications if unable to obtain an accurate list in Pre-Admission Testing.        If you were given a blood bank ID arm band remember to bring it with you the day of surgery.    Preventing a Surgical Site Infection:  For 2 to 3 days before surgery, avoid shaving with a razor because the razor can irritate skin and make it easier to develop an infection.    Any areas of open skin can increase the risk of a post-operative wound infection by allowing bacteria to enter and travel throughout the body.  Notify your surgeon if you have any skin wounds / rashes even if it is not near the expected surgical site.  The area will need assessed to determine if surgery should be delayed until it is healed.  The night  prior to surgery shower using a fresh bar of anti-bacterial soap (such as Dial) and clean washcloth.  Sleep in a clean bed with clean clothing.  Do not allow pets to sleep with you.  Shower on the morning of surgery using a fresh bar of anti-bacterial soap (such as Dial) and clean washcloth.  Dry with a clean towel and dress in clean clothing.  Ask your surgeon if you will be receiving antibiotics prior to surgery.  Make sure you, your family, and all healthcare providers clean their hands with soap and water or an alcohol based hand  before caring for you or your wound.    Day of surgery:5/5/2023   0515  Your arrival time is approximately two hours before your scheduled surgery time.  Upon arrival, a Pre-op nurse and Anesthesiologist will review your health history, obtain vital signs, and answer questions you may have.  The only belongings needed at this time will be your home medications and if applicable your C-PAP/BI-PAP machine.  A Pre-op nurse will start an IV and you may receive medication in preparation for surgery, including something to help you relax.      Please be aware that surgery does come with discomfort.  We want to make every effort to control your discomfort so please discuss any uncontrolled symptoms with your nurse.   Your doctor will most likely have prescribed pain medications.      If you are going home after surgery you will receive individualized written care instructions before being discharged.  A responsible adult must drive you to and from the hospital on the day of your surgery and stay with you for 24 hours.  Discharge prescriptions can be filled by the hospital pharmacy during regular pharmacy hours.  If you are having surgery late in the day/evening your prescription may be e-prescribed to your pharmacy.  Please verify your pharmacy hours or chose a 24 hour pharmacy to avoid not having access to your prescription because your pharmacy has closed for the day.    If you are  staying overnight following surgery, you will be transported to your hospital room following the recovery period.  [facility] has all private rooms.    If you have any questions please call Pre-Admission Testing at [phone].  Deductibles and co-payments are collected on the day of service. Please be prepared to pay the required co-pay, deductible or deposit on the day of service as defined by your plan.    Call your surgeon immediately if you experience any of the following symptoms:  Sore Throat  Shortness of Breath or difficulty breathing  Cough  Chills  Body soreness or muscle pain  Headache  Fever  New loss of taste or smell  Do not arrive for your surgery ill.  Your procedure will need to be rescheduled to another time.  You will need to call your physician before the day of surgery to avoid any unnecessary exposure to hospital staff as well as other patients.

## 2023-05-04 ENCOUNTER — HOSPITAL ENCOUNTER (OUTPATIENT)
Dept: INFUSION THERAPY | Facility: HOSPITAL | Age: 63
Discharge: HOME | End: 2023-05-04
Payer: COMMERCIAL

## 2023-05-04 VITALS
SYSTOLIC BLOOD PRESSURE: 108 MMHG | HEART RATE: 80 BPM | RESPIRATION RATE: 20 BRPM | DIASTOLIC BLOOD PRESSURE: 54 MMHG | OXYGEN SATURATION: 97 % | TEMPERATURE: 96.8 F

## 2023-05-04 DIAGNOSIS — N18.30 ANEMIA IN STAGE 3 CHRONIC KIDNEY DISEASE, UNSPECIFIED WHETHER STAGE 3A OR 3B CKD: ICD-10-CM

## 2023-05-04 DIAGNOSIS — D63.1 ANEMIA IN STAGE 3 CHRONIC KIDNEY DISEASE, UNSPECIFIED WHETHER STAGE 3A OR 3B CKD: ICD-10-CM

## 2023-05-04 PROCEDURE — 36430 TRANSFUSION BLD/BLD COMPNT: CPT

## 2023-05-04 PROCEDURE — P9016 RBC LEUKOCYTES REDUCED: HCPCS

## 2023-05-04 PROCEDURE — 86900 BLOOD TYPING SEROLOGIC ABO: CPT

## 2023-05-04 PROCEDURE — 63710000001 DIPHENHYDRAMINE PER 50 MG: Performed by: INTERNAL MEDICINE

## 2023-05-04 RX ORDER — SODIUM CHLORIDE 9 MG/ML
250 INJECTION, SOLUTION INTRAVENOUS AS NEEDED
Status: DISCONTINUED | OUTPATIENT
Start: 2023-05-04 | End: 2023-05-06 | Stop reason: HOSPADM

## 2023-05-04 RX ORDER — ACETAMINOPHEN 325 MG/1
650 TABLET ORAL ONCE
Status: COMPLETED | OUTPATIENT
Start: 2023-05-04 | End: 2023-05-04

## 2023-05-04 RX ORDER — DIPHENHYDRAMINE HCL 25 MG
25 CAPSULE ORAL ONCE
Status: COMPLETED | OUTPATIENT
Start: 2023-05-04 | End: 2023-05-04

## 2023-05-04 RX ADMIN — DIPHENHYDRAMINE HYDROCHLORIDE 25 MG: 25 CAPSULE ORAL at 09:48

## 2023-05-04 RX ADMIN — ACETAMINOPHEN 650 MG: 325 TABLET, FILM COATED ORAL at 09:48

## 2023-05-05 ENCOUNTER — ANESTHESIA (OUTPATIENT)
Dept: PERIOP | Facility: HOSPITAL | Age: 63
End: 2023-05-05
Payer: COMMERCIAL

## 2023-05-05 ENCOUNTER — HOSPITAL ENCOUNTER (OUTPATIENT)
Facility: HOSPITAL | Age: 63
Setting detail: HOSPITAL OUTPATIENT SURGERY
Discharge: HOME | End: 2023-05-05
Attending: UROLOGY | Admitting: UROLOGY
Payer: COMMERCIAL

## 2023-05-05 ENCOUNTER — ANESTHESIA EVENT (OUTPATIENT)
Dept: PERIOP | Facility: HOSPITAL | Age: 63
End: 2023-05-05
Payer: COMMERCIAL

## 2023-05-05 ENCOUNTER — APPOINTMENT (OUTPATIENT)
Dept: GENERAL RADIOLOGY | Facility: HOSPITAL | Age: 63
End: 2023-05-05
Payer: COMMERCIAL

## 2023-05-05 VITALS
BODY MASS INDEX: 45.99 KG/M2 | RESPIRATION RATE: 20 BRPM | SYSTOLIC BLOOD PRESSURE: 129 MMHG | WEIGHT: 293 LBS | TEMPERATURE: 98.2 F | HEIGHT: 67 IN | DIASTOLIC BLOOD PRESSURE: 49 MMHG | HEART RATE: 81 BPM | OXYGEN SATURATION: 99 %

## 2023-05-05 DIAGNOSIS — N95.0 POSTMENOPAUSAL BLEEDING: ICD-10-CM

## 2023-05-05 DIAGNOSIS — N13.30 HYDRONEPHROSIS, UNSPECIFIED HYDRONEPHROSIS TYPE: Primary | ICD-10-CM

## 2023-05-05 LAB
ANION GAP SERPL CALCULATED.3IONS-SCNC: 8.7 MMOL/L (ref 5–15)
BH BB BLOOD EXPIRATION DATE: NORMAL
BH BB BLOOD EXPIRATION DATE: NORMAL
BH BB BLOOD TYPE BARCODE: 6200
BH BB BLOOD TYPE BARCODE: 6200
BH BB DISPENSE STATUS: NORMAL
BH BB DISPENSE STATUS: NORMAL
BH BB PRODUCT CODE: NORMAL
BH BB PRODUCT CODE: NORMAL
BH BB UNIT NUMBER: NORMAL
BH BB UNIT NUMBER: NORMAL
BUN SERPL-MCNC: 31 MG/DL (ref 8–23)
BUN/CREAT SERPL: 5.8 (ref 7–25)
CALCIUM SPEC-SCNC: 8.8 MG/DL (ref 8.6–10.5)
CHLORIDE SERPL-SCNC: 102 MMOL/L (ref 98–107)
CO2 SERPL-SCNC: 28.3 MMOL/L (ref 22–29)
CREAT SERPL-MCNC: 5.3 MG/DL (ref 0.57–1)
CROSSMATCH INTERPRETATION: NORMAL
CROSSMATCH INTERPRETATION: NORMAL
DEPRECATED RDW RBC AUTO: 61.9 FL (ref 37–54)
EGFRCR SERPLBLD CKD-EPI 2021: 8.5 ML/MIN/1.73
ERYTHROCYTE [DISTWIDTH] IN BLOOD BY AUTOMATED COUNT: 17.2 % (ref 12.3–15.4)
GLUCOSE BLDC GLUCOMTR-MCNC: 160 MG/DL (ref 70–130)
GLUCOSE BLDC GLUCOMTR-MCNC: 194 MG/DL (ref 70–130)
GLUCOSE SERPL-MCNC: 203 MG/DL (ref 65–99)
HCT VFR BLD AUTO: 21.4 % (ref 34–46.6)
HGB BLD-MCNC: 7 G/DL (ref 12–15.9)
MCH RBC QN AUTO: 32.6 PG (ref 26.6–33)
MCHC RBC AUTO-ENTMCNC: 32.7 G/DL (ref 31.5–35.7)
MCV RBC AUTO: 99.5 FL (ref 79–97)
PLATELET # BLD AUTO: 140 10*3/MM3 (ref 140–450)
PMV BLD AUTO: 10.1 FL (ref 6–12)
POTASSIUM SERPL-SCNC: 4.7 MMOL/L (ref 3.5–5.2)
RBC # BLD AUTO: 2.15 10*6/MM3 (ref 3.77–5.28)
SODIUM SERPL-SCNC: 139 MMOL/L (ref 136–145)
UNIT  ABO: NORMAL
UNIT  ABO: NORMAL
UNIT  RH: NORMAL
UNIT  RH: NORMAL
WBC NRBC COR # BLD: 3.01 10*3/MM3 (ref 3.4–10.8)

## 2023-05-05 PROCEDURE — 85027 COMPLETE CBC AUTOMATED: CPT | Performed by: ANESTHESIOLOGY

## 2023-05-05 PROCEDURE — 88305 TISSUE EXAM BY PATHOLOGIST: CPT | Performed by: UROLOGY

## 2023-05-05 PROCEDURE — C1769 GUIDE WIRE: HCPCS | Performed by: UROLOGY

## 2023-05-05 PROCEDURE — 74420 UROGRAPHY RTRGR +-KUB: CPT

## 2023-05-05 PROCEDURE — C1758 CATHETER, URETERAL: HCPCS | Performed by: UROLOGY

## 2023-05-05 PROCEDURE — C2617 STENT, NON-COR, TEM W/O DEL: HCPCS | Performed by: UROLOGY

## 2023-05-05 PROCEDURE — 25010000002 DEXAMETHASONE SODIUM PHOSPHATE 20 MG/5ML SOLUTION: Performed by: NURSE ANESTHETIST, CERTIFIED REGISTERED

## 2023-05-05 PROCEDURE — 82948 REAGENT STRIP/BLOOD GLUCOSE: CPT

## 2023-05-05 PROCEDURE — 25010000002 CEFAZOLIN PER 500 MG: Performed by: UROLOGY

## 2023-05-05 PROCEDURE — 25010000002 PROPOFOL 10 MG/ML EMULSION: Performed by: NURSE ANESTHETIST, CERTIFIED REGISTERED

## 2023-05-05 PROCEDURE — 25010000002 ROPIVACAINE PER 1 MG: Performed by: OBSTETRICS & GYNECOLOGY

## 2023-05-05 PROCEDURE — 80048 BASIC METABOLIC PNL TOTAL CA: CPT | Performed by: ANESTHESIOLOGY

## 2023-05-05 PROCEDURE — 25010000002 ONDANSETRON PER 1 MG: Performed by: NURSE ANESTHETIST, CERTIFIED REGISTERED

## 2023-05-05 PROCEDURE — 25010000002 SUGAMMADEX 200 MG/2ML SOLUTION: Performed by: NURSE ANESTHETIST, CERTIFIED REGISTERED

## 2023-05-05 DEVICE — ST STNT URETRL SIL FILFRM DBLPIG RO 6F 26CM BLK: Type: IMPLANTABLE DEVICE | Site: URETER | Status: FUNCTIONAL

## 2023-05-05 RX ORDER — EPHEDRINE SULFATE 50 MG/ML
5 INJECTION, SOLUTION INTRAVENOUS ONCE AS NEEDED
Status: DISCONTINUED | OUTPATIENT
Start: 2023-05-05 | End: 2023-05-05 | Stop reason: HOSPADM

## 2023-05-05 RX ORDER — SODIUM CHLORIDE 9 MG/ML
INJECTION, SOLUTION INTRAVENOUS AS NEEDED
Status: DISCONTINUED | OUTPATIENT
Start: 2023-05-05 | End: 2023-05-05 | Stop reason: HOSPADM

## 2023-05-05 RX ORDER — ONDANSETRON 2 MG/ML
INJECTION INTRAMUSCULAR; INTRAVENOUS AS NEEDED
Status: DISCONTINUED | OUTPATIENT
Start: 2023-05-05 | End: 2023-05-05 | Stop reason: SURG

## 2023-05-05 RX ORDER — PROMETHAZINE HYDROCHLORIDE 25 MG/1
25 TABLET ORAL ONCE AS NEEDED
Status: DISCONTINUED | OUTPATIENT
Start: 2023-05-05 | End: 2023-05-05 | Stop reason: HOSPADM

## 2023-05-05 RX ORDER — MAGNESIUM HYDROXIDE 1200 MG/15ML
LIQUID ORAL AS NEEDED
Status: DISCONTINUED | OUTPATIENT
Start: 2023-05-05 | End: 2023-05-05 | Stop reason: HOSPADM

## 2023-05-05 RX ORDER — IPRATROPIUM BROMIDE AND ALBUTEROL SULFATE 2.5; .5 MG/3ML; MG/3ML
3 SOLUTION RESPIRATORY (INHALATION) ONCE AS NEEDED
Status: DISCONTINUED | OUTPATIENT
Start: 2023-05-05 | End: 2023-05-05 | Stop reason: HOSPADM

## 2023-05-05 RX ORDER — PROMETHAZINE HYDROCHLORIDE 25 MG/1
25 SUPPOSITORY RECTAL ONCE AS NEEDED
Status: DISCONTINUED | OUTPATIENT
Start: 2023-05-05 | End: 2023-05-05 | Stop reason: HOSPADM

## 2023-05-05 RX ORDER — ONDANSETRON 2 MG/ML
4 INJECTION INTRAMUSCULAR; INTRAVENOUS ONCE AS NEEDED
Status: DISCONTINUED | OUTPATIENT
Start: 2023-05-05 | End: 2023-05-05 | Stop reason: HOSPADM

## 2023-05-05 RX ORDER — CEPHALEXIN 500 MG/1
500 CAPSULE ORAL 2 TIMES DAILY
Qty: 14 CAPSULE | Refills: 0 | Status: SHIPPED | OUTPATIENT
Start: 2023-05-05

## 2023-05-05 RX ORDER — PROPOFOL 10 MG/ML
VIAL (ML) INTRAVENOUS AS NEEDED
Status: DISCONTINUED | OUTPATIENT
Start: 2023-05-05 | End: 2023-05-05 | Stop reason: SURG

## 2023-05-05 RX ORDER — HYDROCODONE BITARTRATE AND ACETAMINOPHEN 5; 325 MG/1; MG/1
1-2 TABLET ORAL EVERY 4 HOURS PRN
Qty: 12 TABLET | Refills: 0 | Status: SHIPPED | OUTPATIENT
Start: 2023-05-05

## 2023-05-05 RX ORDER — DIPHENHYDRAMINE HYDROCHLORIDE 50 MG/ML
12.5 INJECTION INTRAMUSCULAR; INTRAVENOUS
Status: DISCONTINUED | OUTPATIENT
Start: 2023-05-05 | End: 2023-05-05 | Stop reason: HOSPADM

## 2023-05-05 RX ORDER — FLUMAZENIL 0.1 MG/ML
0.2 INJECTION INTRAVENOUS AS NEEDED
Status: DISCONTINUED | OUTPATIENT
Start: 2023-05-05 | End: 2023-05-05 | Stop reason: HOSPADM

## 2023-05-05 RX ORDER — ROCURONIUM BROMIDE 10 MG/ML
INJECTION, SOLUTION INTRAVENOUS AS NEEDED
Status: DISCONTINUED | OUTPATIENT
Start: 2023-05-05 | End: 2023-05-05 | Stop reason: SURG

## 2023-05-05 RX ORDER — HYDROCODONE BITARTRATE AND ACETAMINOPHEN 5; 325 MG/1; MG/1
1 TABLET ORAL ONCE AS NEEDED
Status: DISCONTINUED | OUTPATIENT
Start: 2023-05-05 | End: 2023-05-05 | Stop reason: HOSPADM

## 2023-05-05 RX ORDER — LIDOCAINE HYDROCHLORIDE 20 MG/ML
INJECTION, SOLUTION INFILTRATION; PERINEURAL AS NEEDED
Status: DISCONTINUED | OUTPATIENT
Start: 2023-05-05 | End: 2023-05-05 | Stop reason: SURG

## 2023-05-05 RX ORDER — PHENYLEPHRINE HCL IN 0.9% NACL 1 MG/10 ML
SYRINGE (ML) INTRAVENOUS AS NEEDED
Status: DISCONTINUED | OUTPATIENT
Start: 2023-05-05 | End: 2023-05-05 | Stop reason: SURG

## 2023-05-05 RX ORDER — CEFAZOLIN SODIUM 2 G/100ML
2 INJECTION, SOLUTION INTRAVENOUS ONCE
Status: DISCONTINUED | OUTPATIENT
Start: 2023-05-05 | End: 2023-05-05 | Stop reason: DRUGHIGH

## 2023-05-05 RX ORDER — ROPIVACAINE HYDROCHLORIDE 5 MG/ML
INJECTION, SOLUTION EPIDURAL; INFILTRATION; PERINEURAL AS NEEDED
Status: DISCONTINUED | OUTPATIENT
Start: 2023-05-05 | End: 2023-05-05 | Stop reason: HOSPADM

## 2023-05-05 RX ORDER — FENTANYL CITRATE 50 UG/ML
25 INJECTION, SOLUTION INTRAMUSCULAR; INTRAVENOUS
Status: DISCONTINUED | OUTPATIENT
Start: 2023-05-05 | End: 2023-05-05 | Stop reason: HOSPADM

## 2023-05-05 RX ORDER — HYDROMORPHONE HYDROCHLORIDE 1 MG/ML
0.25 INJECTION, SOLUTION INTRAMUSCULAR; INTRAVENOUS; SUBCUTANEOUS
Status: DISCONTINUED | OUTPATIENT
Start: 2023-05-05 | End: 2023-05-05 | Stop reason: HOSPADM

## 2023-05-05 RX ORDER — DEXAMETHASONE SODIUM PHOSPHATE 4 MG/ML
INJECTION, SOLUTION INTRA-ARTICULAR; INTRALESIONAL; INTRAMUSCULAR; INTRAVENOUS; SOFT TISSUE AS NEEDED
Status: DISCONTINUED | OUTPATIENT
Start: 2023-05-05 | End: 2023-05-05 | Stop reason: SURG

## 2023-05-05 RX ORDER — HYDRALAZINE HYDROCHLORIDE 20 MG/ML
5 INJECTION INTRAMUSCULAR; INTRAVENOUS
Status: DISCONTINUED | OUTPATIENT
Start: 2023-05-05 | End: 2023-05-05 | Stop reason: HOSPADM

## 2023-05-05 RX ORDER — DROPERIDOL 2.5 MG/ML
0.62 INJECTION, SOLUTION INTRAMUSCULAR; INTRAVENOUS
Status: DISCONTINUED | OUTPATIENT
Start: 2023-05-05 | End: 2023-05-05 | Stop reason: HOSPADM

## 2023-05-05 RX ORDER — LIDOCAINE HYDROCHLORIDE 20 MG/ML
INJECTION, SOLUTION INFILTRATION; PERINEURAL AS NEEDED
Status: DISCONTINUED | OUTPATIENT
Start: 2023-05-05 | End: 2023-05-05

## 2023-05-05 RX ORDER — NALOXONE HCL 0.4 MG/ML
0.2 VIAL (ML) INJECTION AS NEEDED
Status: DISCONTINUED | OUTPATIENT
Start: 2023-05-05 | End: 2023-05-05 | Stop reason: HOSPADM

## 2023-05-05 RX ORDER — CEFAZOLIN SODIUM IN 0.9 % NACL 3 G/100 ML
3 INTRAVENOUS SOLUTION, PIGGYBACK (ML) INTRAVENOUS ONCE
Status: COMPLETED | OUTPATIENT
Start: 2023-05-05 | End: 2023-05-05

## 2023-05-05 RX ORDER — HYDROCODONE BITARTRATE AND ACETAMINOPHEN 7.5; 325 MG/1; MG/1
1 TABLET ORAL EVERY 4 HOURS PRN
Status: DISCONTINUED | OUTPATIENT
Start: 2023-05-05 | End: 2023-05-05 | Stop reason: HOSPADM

## 2023-05-05 RX ORDER — LABETALOL HYDROCHLORIDE 5 MG/ML
5 INJECTION, SOLUTION INTRAVENOUS
Status: DISCONTINUED | OUTPATIENT
Start: 2023-05-05 | End: 2023-05-05 | Stop reason: HOSPADM

## 2023-05-05 RX ORDER — KETAMINE HCL IN NACL, ISO-OSM 100MG/10ML
SYRINGE (ML) INJECTION AS NEEDED
Status: DISCONTINUED | OUTPATIENT
Start: 2023-05-05 | End: 2023-05-05 | Stop reason: SURG

## 2023-05-05 RX ADMIN — DEXAMETHASONE SODIUM PHOSPHATE 4 MG: 4 INJECTION, SOLUTION INTRAMUSCULAR; INTRAVENOUS at 07:19

## 2023-05-05 RX ADMIN — Medication 40 MG: at 07:11

## 2023-05-05 RX ADMIN — Medication 200 MCG: at 07:12

## 2023-05-05 RX ADMIN — Medication 200 MCG: at 07:34

## 2023-05-05 RX ADMIN — SODIUM CHLORIDE: 0.9 INJECTION, SOLUTION INTRAVENOUS at 07:08

## 2023-05-05 RX ADMIN — Medication 10 MG: at 07:48

## 2023-05-05 RX ADMIN — ROCURONIUM BROMIDE 50 MG: 10 INJECTION, SOLUTION INTRAVENOUS at 07:11

## 2023-05-05 RX ADMIN — LIDOCAINE HYDROCHLORIDE 100 MG: 20 INJECTION, SOLUTION INFILTRATION; PERINEURAL at 08:42

## 2023-05-05 RX ADMIN — PROPOFOL 20 MG: 10 INJECTION, EMULSION INTRAVENOUS at 07:31

## 2023-05-05 RX ADMIN — Medication 200 MCG: at 08:05

## 2023-05-05 RX ADMIN — CEFAZOLIN 3 G: 10 INJECTION, POWDER, FOR SOLUTION INTRAVENOUS at 07:04

## 2023-05-05 RX ADMIN — LIDOCAINE HYDROCHLORIDE 100 MG: 20 INJECTION, SOLUTION INFILTRATION; PERINEURAL at 07:11

## 2023-05-05 RX ADMIN — ROCURONIUM BROMIDE 20 MG: 10 INJECTION, SOLUTION INTRAVENOUS at 07:44

## 2023-05-05 RX ADMIN — Medication 100 MCG: at 07:20

## 2023-05-05 RX ADMIN — Medication 200 MCG: at 07:56

## 2023-05-05 RX ADMIN — Medication 200 MCG: at 08:11

## 2023-05-05 RX ADMIN — ONDANSETRON 4 MG: 2 INJECTION INTRAMUSCULAR; INTRAVENOUS at 07:49

## 2023-05-05 RX ADMIN — PROPOFOL 110 MG: 10 INJECTION, EMULSION INTRAVENOUS at 07:11

## 2023-05-05 RX ADMIN — SUGAMMADEX 200 MG: 100 INJECTION, SOLUTION INTRAVENOUS at 08:35

## 2023-05-05 NOTE — OP NOTE
Operative Report     [facility] OR    Patient: Juana Hall  Age:      62 y.o.  :     1960  Sex:      female    Medical Record:  [id number]    Date of Operation/Procedure:  2023    Pre-operative Diagnosis:  Left hydronephrosis    Post-operative Diagnosis:  Same, gross hematuria    Surgeon:  Rafi    Name of Operation/Procedure:  Procedure(s) and Anesthesia Type:  Panel 1:     * CYSTOSCOPY WITH LEFT URETERAL STENT PLACEMENT, RETROGRADE PYELOGRAM, CLOT EVACUATION, BLADDER FULGARATION - General    Panel 2:     * HYSTEROSCOPY /DILATATION AND CURETTAGE/NOVASURE - General    Findings/Complications:  No complications.    Significant clot burden seen on cystoscopy. Bleeding sight seed over the left lateral bladder wall. There was an approximately 3 cm area with diffuse oozing. After fulguration, no bleeding wasseen.    Left retrograde pyelogram interpretation: No hydronephrosis, filling defects, or extravasation.    Description of procedure: The patient was taken to the OR and placed under GA in lithotomy position.  Prepped and draped in sterile fashion.  The 21 Fr cystoscope was introduced and pancystoscopy was performed. There was a large organized clot in the bladder. The scope was removed, and a 22 Fr Rusche catheter was inserted. The clot evacuated with a Alba syringe. The scope was replaced with findings as above. A Bugbee electrode was used to fulgurate the bleedinig site.  No tumors or stones were seen.  A Sensor guidewire was passed through the left ureteral orifice into the kidney without difficulty. A [place] catheter was inserted, and a retrograde pyelogram was performed with findings as above. A 6 Fr x 26 cm JJ black beauty stent was passed into the kidney into the proper anatomic position.    Estimated Blood Loss: 100ml    Specimens:   Order Name Source Comment Collection Info Order Time   BASIC METABOLIC PANEL   Collected By: Ashely Cardozo RN 2023  6:29 AM   CBC (NO DIFF)   Collected  By: Ashely Cardozo RN 5/5/2023  6:29 AM       Fluids/Drains: none    Leodan Mckee Jr., MD  5/5/2023  08:15 EDT

## 2023-05-05 NOTE — DISCHARGE INSTRUCTIONS
Resume Aspirin in 2 weeks   Dilatation and Curettage, Care After  Refer to this sheet for the next few weeks. These instructions provide you with information on caring for yourself after your procedure. Your health care provider may also give you more specific instructions.  Your treatment has been planned according to current medical practices, but problems sometimes occur.  Call your health care provider if you have any problems or questions after your care.  HOME CARE INSTRUCTIONS  It is normal to have vaginal bleeding/abdominal cramping for the next 2 weeks.  Wait 1 week before returning to strenuous activity.  Drink enough fluids to keep your urine clear or pale yellow.  You may shower tomorrow.  Do not take a bath, go swimming or use a hot tub until your health care provider approves.  Only take over-the-counter or prescription medicines as directed by your health care provider.  Follow up with your provider as directed.    YOU WILL BE ON PELVIC REST FOR THE NEXT 2 WEEKS OR UNTIL SPECIFIED BY YOUR PHYSICIAN. PELVIC REST INCLUDES:  Avoiding long periods of standing.  Avoiding heavy lifting, pushing or pulling.  DO NOT lift anything heavier than 10 pounds (4.5 kg)  DO NOT douche, use tampons, or have sex (intercourse) for 2 weeks after the procedure.    SEEK MEDICAL CARE IF:    You have increasing cramps or pain that is not relieved with medicine.  You have bad smelling vaginal discharge.  You are having problems with any medicine.  You have bleeding that is heavier than a normal menstrual period (greater than 1 pad/hour) or you notice large clots.  You have a fever > 101.  You have chest pain or shortness of breath.

## 2023-05-05 NOTE — ANESTHESIA PROCEDURE NOTES
Airway  Urgency: elective    Date/Time: 5/5/2023 7:13 AM  Airway not difficult    General Information and Staff    Patient location during procedure: OR  Anesthesiologist: Dl Vicente MD  CRNA/CAA: Alison Floyd CRNA    Indications and Patient Condition  Indications for airway management: airway protection    Preoxygenated: yes  Mask difficulty assessment: 2 - vent by mask + OA or adjuvant +/- NMBA    Final Airway Details  Final airway type: endotracheal airway      Successful airway: ETT  Cuffed: yes   Successful intubation technique: video laryngoscopy  Facilitating devices/methods: intubating stylet and cricoid pressure  Endotracheal tube insertion site: oral  Blade: CMAC  Blade size: D  ETT size (mm): 7.0  Cormack-Lehane Classification: grade IIa - partial view of glottis  Placement verified by: chest auscultation and capnometry   Inital cuff pressure (cm H2O): 20  Cuff volume (mL): 8  Measured from: lips  Number of attempts at approach: 1  Assessment: lips, teeth, and gum same as pre-op and atraumatic intubation

## 2023-05-05 NOTE — ANESTHESIA PREPROCEDURE EVALUATION
Anesthesia Evaluation     Patient summary reviewed and Nursing notes reviewed   NPO Solid Status: > 6 hours  NPO Liquid Status: > 6 hours           Airway   Mallampati: III  TM distance: >3 FB  Neck ROM: full  Dental - normal exam     Pulmonary    (+) sleep apnea on CPAP,   (-) COPD, asthma, not a smoker  Cardiovascular   Exercise tolerance: poor (<4 METS)    ECG reviewed    (+) hypertension, valvular problems/murmurs AS and MS, CHF Diastolic >=55%, hyperlipidemia,   (-) CAD, dysrhythmias, angina, cardiac stents    ROS comment: Hx severe AS s/p valvuloplasty 1/2023. Residual moderate AS. Additionally, moderate MS and gr 1 diastolic dysfxn on most recent echo.     Neuro/Psych  (+) weakness, numbness, psychiatric history Anxiety and Depression,    (-) seizures, CVA  GI/Hepatic/Renal/Endo    (+) morbid obesity,  renal disease ESRD, diabetes mellitus type 2, thyroid problem hypothyroidism    Musculoskeletal     Abdominal    Substance History      OB/GYN          Other   arthritis, blood dyscrasia anemia,   history of cancer (hx breast ca)                  Anesthesia Plan    ASA 4     general       Anesthetic plan, risks, benefits, and alternatives have been provided, discussed and informed consent has been obtained with: patient.        CODE STATUS:

## 2023-05-05 NOTE — H&P
[facility]   HISTORY AND PHYSICAL    Patient Name:Juana Hall  : 1960  MRN: [id number]  Primary Care Physician: Kiana Noriega (Avila), BETY  Date of admission: 2023    Subjective   Subjective     Chief Complaint: post-menopausal bleeding    History of Present Illness   Juana Hall is a 62 y.o. female with metastatic breast  Cancer, CHF, CKD on dialysis, BMI 58, anemia s/p transfusion yesterday, now with PMB.  Last episode 4 years ago s/p D&C with benign pathology.  U/S done a few days ago showed mildly thickened endometrium.     Review of Systems      Personal History     Past Medical History:   Diagnosis Date   • Anemia    • Anxiety and depression    • Bicuspid aortic valve     had surgery   • Breast cancer     RIGHT, HAD NEELA. MASTECTOMY, CHEMO AND RADIATION   • CHF (congestive heart failure)    • CKD (chronic kidney disease)     dialysis   • COVID 2023   • Diabetes mellitus    • Dialysis patient     pt does at home   • Elevated cholesterol    • Frequent UTI     last 6 months   • Heart murmur    • History of cancer chemotherapy    • History of hypertension     due to low b/p was taken off meds   • History of kidney stones    • History of MRSA infection     POST BREAST SURGERY-TREATED [facility]   • History of radiation therapy     2 times [id number] for breast cancer   and  for omentum cancer   • History of sepsis 2010    KIDNEY STONE   • History of transfusion     no reaction   • Hyperlipidemia    • Hyperthyroidism    • Hypothyroidism    • Kidney stone     CURRENT LEFT-DEC 2021   • Lymphedema of leg     LEFT   • Metastasis from breast cancer 2019    STOMACH-OMENTUM   • Neuromuscular disorder    • Obesity    • ANDREW on CPAP     CPAP   • Osteoarthrosis    • Peripheral neuropathy     FEET BILAT   • Radiculopathy    • Rectal bleeding 2022   • Thickened endometrium    • Urinary incontinence     wears pads   • Weakness     BILAT LEGS-WITH ANY AMT OF TIME       Past Surgical  History:   Procedure Laterality Date   • AORTIC VALVULOPLASTY  2023   • ARTERIOVENOUS FISTULA/SHUNT SURGERY Left 2021    Procedure: LEFT  BRACHIOCEPALIC ARTERIOVENOUS FISTULA;  Surgeon: Dejuan Adler MD;  Location: [facility] MAIN OR;  Service: Vascular;  Laterality: Left;   • BREAST RECONSTRUCTION      WITH IMPLANTS, AND REVISION, NOW REMOVED   • BREAST SURGERY Bilateral     breast implants removed and then replaced due to infection   • CARDIAC CATHETERIZATION N/A 2022    Procedure: CORONARY ANGIOGRAPHY;  Surgeon: Luis Rivers MD;  [facility];  Service: Cardiology;  Laterality: N/A;   • CARDIAC CATHETERIZATION N/A 2022    Procedure: Right Heart Cath;  Surgeon: Luis Rivers MD;  [facility];  Service: Cardiology;  Laterality: N/A;   • CARDIAC CATHETERIZATION N/A 01/10/2023    Procedure: Valvuloplasty;  Surgeon: Luis Rivers MD;  [facility];  Service: Cardiovascular;  Laterality: N/A;  01/10/2023   • CARDIAC CATHETERIZATION N/A 01/10/2023    Procedure: Aortic root aortogram;  Surgeon: Luis Rivers MD;  [facility];  Service: Cardiovascular;  Laterality: N/A;   • CATARACT EXTRACTION WITH INTRAOCULAR LENS IMPLANT Bilateral    •  SECTION  1987   •  SECTION  1987   • CYSTOSCOPY W/ URETERAL STENT PLACEMENT     • CYSTOSCOPY W/ URETERAL STENT PLACEMENT Left 2021    Procedure: CYSTOSCOPY KEFT RETROGRADE PYELOGRAM  LEFT  URETERAL STENT PLACEMENT;  Surgeon: Leodan Mckee Jr., MD;  Location: [facility] OR;  Service: Urology;  Laterality: Left;   • CYSTOSCOPY W/ URETERAL STENT PLACEMENT Left 03/10/2022    Procedure: CYSTOSCOPY AND LEFT URETERAL STENT EXCHANGE, RETROGRADE PYELOGRAM;  Surgeon: Leodan Mckee Jr., MD;  Location: [facility] OR;  Service: Urology;  Laterality: Left;   •  D & C HYSTEROSCOPY N/A 05/22/2017    Procedure: DILATATION AND CURETTAGE, HYSTEROSCOPY ;  Surgeon: Cherie Oliveros MD;  Location: [facility] OR [facility];  Service:    • D & C HYSTEROSCOPY N/A 09/19/2019    Procedure: DILATATION AND CURETTAGE HYSTEROSCOPY/POLYPECTOMY;  Surgeon: Cherie Oliveros MD;  Location: [facility] OR [facility];  Service: Gynecology   • ENDOSCOPY     • INSERTION AND REMOVAL HEMODIALYSIS CATHETER Right 05/01/2021   • INSERTION HEMODIALYSIS CATHETER Right 05/01/2021    Procedure: HEMODIALYSIS CATHETER INSERTION;  Surgeon: Clint Hernández MD;  Location: [facility] MAIN OR;  Service: Vascular;  Laterality: Right;   • LYMPH NODE BIOPSY     • MASTECTOMY Bilateral 2004   • VENOUS ACCESS DEVICE (PORT) INSERTION AND REMOVAL         Family History: Her family history includes Aortic aneurysm in her father; Arthritis in her father and mother; Breast cancer in her mother; Cancer in her mother; Coronary artery disease in her father, maternal grandmother, and mother; Diabetes in her mother; Heart attack (age of onset: 72) in her mother; Heart attack (age of onset: 74) in her father; Heart attack (age of onset: 76) in her maternal grandmother; Heart disease in her father; Hyperlipidemia in her father and mother.     Social History: She  reports that she has never smoked. She has never used smokeless tobacco. She reports that she does not drink alcohol and does not use drugs.    Home Medications:  Abemaciclib, Fulvestrant, acetaminophen, aspirin, atorvastatin, folic acid, gabapentin, insulin detemir, levothyroxine, mupirocin, ondansetron, sertraline, and vitamin B-12    Allergies:  She has No Known Allergies.    Objective    Objective     Vitals:    Temp:  [96.4 °F (35.8 °C)-98.1 °F (36.7 °C)] 97.8 °F (36.6 °C)  Heart Rate:  [77-86] 83  Resp:  [18-20] 20  BP: ()/(41-59) 125/59    Physical Exam     Result Review    Result Review:  I have personally reviewed the results from the time of this admission to 5/5/2023 07:09 EDT and  agree with these findings:  [x]  Laboratory list / accordion  []  Microbiology  [x]  Radiology  []  EKG/Telemetry   []  Cardiology/Vascular   [x]  Pathology  []  Old records  []  Other:        Assessment & Plan   Assessment / Plan     Brief Patient Summary:  Juana Hall is a 62 y.o. female with PMB.    Active Hospital Problems:  There are no active hospital problems to display for this patient.    Plan:   Hysteroscopy/D&C. Risks reviewed, consent obtained    DVT prophylaxis:  No DVT prophylaxis order currently exists.    Ina Marcos MD

## 2023-05-05 NOTE — H&P
FIRST UROLOGY CONSULT      Patient Identification:  NAME:  Juana Hall  Age:  62 y.o.   Sex:  female   :  1960   MRN:  [id number]       Chief complaint: Left hydronephrosis    History of present illness:  This is a 62 year old woman with a history of hydronephrosis secondary to metastatic breast cancer who presents for cystoscopy and left ureteral stent placement. We discussed the risks of the procedure including bleeding, infection, damage to surrounding structures, pain, need to perform additional procedures such as nephrostomy tube placement, recurrence of disease, thromboembolism, MI, stroke, coma, death. She understands these risks and would like to proceed.      Past medical history:  Past Medical History:   Diagnosis Date   • Anemia    • Anxiety and depression    • Bicuspid aortic valve     had surgery   • Breast cancer     RIGHT, HAD NELEA. MASTECTOMY, CHEMO AND RADIATION   • CHF (congestive heart failure)    • CKD (chronic kidney disease)     dialysis   • COVID 2023   • Diabetes mellitus    • Dialysis patient     pt does at home   • Elevated cholesterol    • Frequent UTI     last 6 months   • Heart murmur    • History of cancer chemotherapy    • History of hypertension     due to low b/p was taken off meds   • History of kidney stones    • History of MRSA infection     POST BREAST SURGERY-TREATED [facility]   • History of radiation therapy     2 times [id number] for breast cancer   and  for omentum cancer   • History of sepsis 2010    KIDNEY STONE   • History of transfusion     no reaction   • Hyperlipidemia    • Hyperthyroidism    • Hypothyroidism    • Kidney stone     CURRENT LEFT-DEC 2021   • Lymphedema of leg     LEFT   • Metastasis from breast cancer 2019    STOMACH-OMENTUM   • Neuromuscular disorder    • Obesity    • ANDREW on CPAP     CPAP   • Osteoarthrosis    • Peripheral neuropathy     FEET BILAT   • Radiculopathy    • Rectal bleeding 2022   • Thickened  endometrium    • Urinary incontinence     wears pads   • Weakness     BILAT LEGS-WITH ANY AMT OF TIME       Past surgical history:  Past Surgical History:   Procedure Laterality Date   • AORTIC VALVULOPLASTY  2023   • ARTERIOVENOUS FISTULA/SHUNT SURGERY Left 2021    Procedure: LEFT  BRACHIOCEPALIC ARTERIOVENOUS FISTULA;  Surgeon: Dejuan Adler MD;  Location: [facility] MAIN OR;  Service: Vascular;  Laterality: Left;   • BREAST RECONSTRUCTION      WITH IMPLANTS, AND REVISION, NOW REMOVED   • BREAST SURGERY Bilateral     breast implants removed and then replaced due to infection   • CARDIAC CATHETERIZATION N/A 2022    Procedure: CORONARY ANGIOGRAPHY;  Surgeon: Luis Rivers MD;  Location:  [facility] CATH INVASIVE LOCATION;  Service: Cardiology;  Laterality: N/A;   • CARDIAC CATHETERIZATION N/A 2022    Procedure: Right Heart Cath;  Surgeon: Luis Rivers MD;  Location:  [facility] CATH INVASIVE LOCATION;  Service: Cardiology;  Laterality: N/A;   • CARDIAC CATHETERIZATION N/A 01/10/2023    Procedure: Valvuloplasty;  Surgeon: Luis Rivers MD;  [facility];  Service: Cardiovascular;  Laterality: N/A;  01/10/2023   • CARDIAC CATHETERIZATION N/A 01/10/2023    Procedure: Aortic root aortogram;  Surgeon: Luis Rivers MD;  [facility];  Service: Cardiovascular;  Laterality: N/A;   • CATARACT EXTRACTION WITH INTRAOCULAR LENS IMPLANT Bilateral    •  SECTION  1987   •  SECTION  1987   • CYSTOSCOPY W/ URETERAL STENT PLACEMENT     • CYSTOSCOPY W/ URETERAL STENT PLACEMENT Left 2021    Procedure: CYSTOSCOPY KEFT RETROGRADE PYELOGRAM  LEFT  URETERAL STENT PLACEMENT;  Surgeon: Leodan Mckee Jr., MD;  Location: [facility] MAIN OR;  Service: Urology;  Laterality: Left;   • CYSTOSCOPY W/ URETERAL STENT PLACEMENT Left 03/10/2022    Procedure: CYSTOSCOPY AND LEFT URETERAL  STENT EXCHANGE, RETROGRADE PYELOGRAM;  Surgeon: Leodan Mckee Jr., MD;  Location: [facility] OR;  Service: Urology;  Laterality: Left;   • D & C HYSTEROSCOPY N/A 05/22/2017    Procedure: DILATATION AND CURETTAGE, HYSTEROSCOPY ;  Surgeon: Cherie Oliveros MD;  Location: [facility] OR [facility];  Service:    • D & C HYSTEROSCOPY N/A 09/19/2019    Procedure: DILATATION AND CURETTAGE HYSTEROSCOPY/POLYPECTOMY;  Surgeon: Cherie Oliveros MD;  Location: [facility] OR [facility];  Service: Gynecology   • ENDOSCOPY     • INSERTION AND REMOVAL HEMODIALYSIS CATHETER Right 05/01/2021   • INSERTION HEMODIALYSIS CATHETER Right 05/01/2021    Procedure: HEMODIALYSIS CATHETER INSERTION;  Surgeon: Clint Hernández MD;  Location: [facility] OR;  Service: Vascular;  Laterality: Right;   • LYMPH NODE BIOPSY     • MASTECTOMY Bilateral 2004   • VENOUS ACCESS DEVICE (PORT) INSERTION AND REMOVAL         Allergies:  Patient has no known allergies.    Home medications:  Medications Prior to Admission   Medication Sig Dispense Refill Last Dose   • acetaminophen (TYLENOL) 500 MG tablet Take 2 tablets by mouth As Needed for Mild Pain.   5/4/2023 at 1000   • atorvastatin (LIPITOR) 40 MG tablet Take 1 tablet by mouth Every Night. 90 tablet 1 5/4/2023 at 1900   • folic acid (FOLVITE) 1 MG tablet TAKE ONE TABLET BY MOUTH DAILY (Patient taking differently: Take 1 tablet by mouth Daily.) 30 tablet 2 5/4/2023 at 0800   • gabapentin (Neurontin) 300 MG capsule Take 1 capsule by mouth Every Night. 60 capsule 2 5/4/2023 at 2230   • insulin detemir (Levemir FlexPen) 100 UNIT/ML injection Inject 50 Units under the skin into the appropriate area as directed Daily. 11 mL 3 5/4/2023   • levothyroxine (SYNTHROID, LEVOTHROID) 50 MCG tablet Take 1 tablet by mouth Daily. (Patient taking differently: Take 1 tablet by mouth Every Morning.) 90 tablet 1 5/4/2023 at 0800   • sertraline (ZOLOFT) 100 MG tablet Take 1.5 tablets by mouth Daily. (Patient taking differently: Take 1.5  tablets by mouth Every Night.) 135 tablet 0 2023   • vitamin B-12 (CYANOCOBALAMIN) 1000 MCG tablet Take 1 tablet by mouth Daily.   2023 at 0800   • Abemaciclib (Verzenio) 100 MG tablet Take 1 tablet by mouth Every Evening. 28 tablet 5 2023   • Abemaciclib (Verzenio) 150 MG tablet Take 1 tablet by mouth Every Morning. 28 tablet 5 2023   • aspirin 81 MG EC tablet Take 1 tablet by mouth Daily.   2023   • Fulvestrant (FASLODEX) 250 MG/5ML chemo syringe Inject  into the appropriate muscle as directed by prescriber Every 30 (Thirty) Days. In MD office   2023   • mupirocin (BACTROBAN) 2 % ointment Apply 1 application topically to the appropriate area as directed Daily. Applied to fistula insertion sites for dialysis   2023   • ondansetron (Zofran) 8 MG tablet Take 1 tablet by mouth Every 8 (Eight) Hours As Needed for Nausea or Vomiting. 30 tablet 2 More than a month        Hospital medications:  ceFAZolin, 3 g, Intravenous, Once             Family history:  Family History   Problem Relation Age of Onset   • Heart attack Mother 72   • Coronary artery disease Mother         Mother  from MI at 72   • Diabetes Mother    • Breast cancer Mother    • Hyperlipidemia Mother    • Arthritis Mother    • Cancer Mother    • Heart attack Father 74   • Aortic aneurysm Father         Father with ruptured thoracic aortic aneurysm   • Coronary artery disease Father         Father  from MI at 74   • Heart disease Father    • Hyperlipidemia Father    • Arthritis Father    • Heart attack Maternal Grandmother 76   • Coronary artery disease Maternal Grandmother         MGM deceeased from MI at age 76   • Malig Hyperthermia Neg Hx        Social history:  Social History     Tobacco Use   • Smoking status: Never   • Smokeless tobacco: Never   Vaping Use   • Vaping Use: Never used   Substance Use Topics   • Alcohol use: No   • Drug use: Never       Review of systems:      Positive for:  As per  HPI  Negative for:  As per HPI    Objective:  TMax 24 hours:   Temp (24hrs), Av.9 °F (36.1 °C), Min:96.4 °F (35.8 °C), Max:98.1 °F (36.7 °C)      Vitals Ranges:   Temp:  [96.4 °F (35.8 °C)-98.1 °F (36.7 °C)] 97.8 °F (36.6 °C)  Heart Rate:  [77-86] 83  Resp:  [18-20] 20  BP: ()/(41-59) 125/59    Intake/Output Last 3 shifts:  No intake/output data recorded.     Physical Exam:    General Appearance:    Alert, cooperative, NAD                                       Neuro/Psych:   Orientation intact, mood/affect pleasant, no focal findings       Results review:   I reviewed the patient's new clinical results.    Data review:  Lab Results (last 24 hours)     Procedure Component Value Units Date/Time    Basic Metabolic Panel [[id number]] Collected: 23    Specimen: Blood Updated: 23    CBC (No Diff) [[id number]] Collected: 23    Specimen: Blood Updated: 23    POC Glucose Once [[id number]]  (Abnormal) Collected: 23    Specimen: Blood Updated: 23     Glucose 194 mg/dL      Comment: Meter: [id number] : [id number] Leidy MCGOWAN              Imaging:  Imaging Results (Last 24 Hours)     ** No results found for the last 24 hours. **             Assessment:       * No active hospital problems. *    Left hydronephrosis    Plan:     Cystoscopy, left ureteral stent placement    Leodan Mckee Jr., MD  23  06:49 EDT

## 2023-05-05 NOTE — OP NOTE
Patient Name: Juana Hall  :  1960  MRN:  [id number]      Date of Service:  23      Surgeon: Ina Marcos MD       Pre-operative diagnosis(es): Thickened endometrium   Post-operative diagnosis(es): same   Procedure(s): Procedure(s):  DILATATION AND CURETTAGE           Anesthesia: Type: General         Operative findings: Exam under anesthesia revealed no blood in the vaginal vault. Per Dr. Mckee there was gross hematuria and clot within the bladder. The cervix was small and normal in appearance. There was minimal tissue on currettage.     Specimens removed: Endometrial currettings           EBL: minimal      Indication for surgery:  Thickened endometrium, possible post-menopausal bleeding    Procedure:   Patient already prepped and draped in dorsal lithotomy per Dr. Mckee. Given evidence that bleeding urologic in etiology and difficulty with visualization and dilation of cervix, decision made to sample lining only.  The cervix was grasped with a tenaculum and was able to dilate to 12mm to allow passage of smallest currette. Uterus sounded to 7cm which is appropriate. Minimal tissue obtained.  All instruments removed and hemostasis insured. All counts correct and patient was awoke from anesthesia and taken to the recovery room.                                              Ina Marcos MD  23  08:44 EDT

## 2023-05-05 NOTE — ANESTHESIA POSTPROCEDURE EVALUATION
"Patient: Juana Hall    Procedure Summary     Date: 05/05/23 Room / Location: [facility] OR [address] [place] [address] MAIN OR    Anesthesia Start: 0708 Anesthesia Stop: 0855    Procedures:       CYSTOSCOPY WITH LEFT URETERAL STENT PLACEMENT, RETROGRADE PYELOGRAM, CLOT EVACUATION, BLADDER FULGARATION (Left)      DILATATION AND CURETTAGE (Vagina) Diagnosis:     Surgeons: Leodan Mckee Jr., MD; Ina Marcos MD Provider: Dl Vicente MD    Anesthesia Type: general ASA Status: 4          Anesthesia Type: general    Vitals  Vitals Value Taken Time   /68 05/05/23 0931   Temp 36.8 °C (98.2 °F) 05/05/23 0852   Pulse 81 05/05/23 0933   Resp 20 05/05/23 0930   SpO2 100 % 05/05/23 0933   Vitals shown include unvalidated device data.        Post Anesthesia Care and Evaluation    Pain management: adequate    Airway patency: patent  Anesthetic complications: No anesthetic complications    Cardiovascular status: acceptable  Respiratory status: acceptable  Hydration status: acceptable    Comments: /68   Pulse 79   Temp 36.8 °C (98.2 °F) (Oral)   Resp 20   Ht 170.2 cm (67\")   Wt (!) 168 kg (370 lb 6 oz)   LMP  (LMP Unknown)   SpO2 100%   BMI 58.01 kg/m²         "

## 2023-05-06 LAB
LAB AP CASE REPORT: NORMAL
PATH REPORT.FINAL DX SPEC: NORMAL
PATH REPORT.GROSS SPEC: NORMAL

## 2023-05-08 ENCOUNTER — HOSPITAL ENCOUNTER (OUTPATIENT)
Dept: MRI IMAGING | Facility: HOSPITAL | Age: 63
Discharge: HOME | End: 2023-05-08
Admitting: INTERNAL MEDICINE
Payer: COMMERCIAL

## 2023-05-08 DIAGNOSIS — C50.919 PRIMARY MALIGNANT NEOPLASM OF BREAST WITH METASTASIS: ICD-10-CM

## 2023-05-08 PROCEDURE — 0 GADOBUTROL 1 MMOL/ML SOLUTION: Performed by: INTERNAL MEDICINE

## 2023-05-08 PROCEDURE — A9585 GADOBUTROL INJECTION: HCPCS | Performed by: INTERNAL MEDICINE

## 2023-05-08 PROCEDURE — 72197 MRI PELVIS W/O & W/DYE: CPT

## 2023-05-08 RX ADMIN — GADOBUTROL 10 ML: 604.72 INJECTION INTRAVENOUS at 11:41

## 2023-05-10 NOTE — PROGRESS NOTES
Chief Complaint  Metastatic lobular breast cancer (ER/NV positive, HER-2/tj negative), anemia secondary to CKD3, CHF, peripheral neuropathy, chemotherapy related nausea chemotherapy-induced myelosuppression    Subjective        History of Present Illness  The patient returns today in follow-up continuing on monthly Faslodex due for cycle 19 today and continuing on abemaciclib.  She did have significant myelosuppression from abemaciclib at 150 mg twice daily and treatment was held beginning 4/13/2023 due to neutropenia and thrombocytopenia.  Patient experienced recovery of counts and on 4/27/2023 we resumed abemaciclib 150 mg in the morning.  She did receive new dose of 100 mg tablets and began taking this in the evening on 5/6/2023.  In the interval, the patient developed significant hematuria which had originally been interpreted as vaginal bleeding.  She then experienced migration and loss of her left ureteral stent spontaneously.  On 5/5/2023 she underwent replacement of left ureteral stent as well as cauterization of a bleeding site in the bladder.  She underwent at the same setting D&C with biopsy that showed no evidence of endometrium, minute fragment of benign endocervical mucosa.  Patient experienced complete resolution of the hematuria.  She remains asymptomatic in regards to the left ureteral stent.  She remains asymptomatic as well in regards to the left acetabular metastasis.  She had been referred at the last visit to radiation oncology to consider palliative radiation to new PET activity in this area.  We did proceed with MRI of the pelvis to confirm presence of metastatic lesion which we are reviewing today.  She is scheduled to see radiation oncology tomorrow.  She was seen as well by cardiology earlier today and felt to be doing well in regards to her aortic stenosis status post TAVR.  She has no new complaints today, continues in a motorized wheelchair.  She continues with home dialysis 5  "days/week and tolerates this well.  She is due for monthly Mircera with nephrology on 5/16/2023.  She did require transfusion support recently for hemoglobin down to 6.1 on 4/20/2023 and 6.3 on 5/3/2023 which was felt to be related to significant hematuria at the time.    Objective   Vital Signs:   BP 95/62   Pulse 77   Temp 98.2 °F (36.8 °C) (Temporal)   Resp 18   Ht 170.2 cm (67.01\")   Wt (!) 167 kg (368 lb 2.7 oz)   SpO2 100%   BMI 57.65 kg/m²     Physical Exam  Constitutional:       Appearance: She is well-developed. She is obese.      Comments: Patient is in a motorized scooter today   Eyes:      Conjunctiva/sclera: Conjunctivae normal.   Neck:      Thyroid: No thyromegaly.   Cardiovascular:      Rate and Rhythm: Normal rate and regular rhythm.      Heart sounds: Murmur heard.     No friction rub. No gallop.      Comments: 2/6 murmur  Pulmonary:      Effort: No respiratory distress.      Breath sounds: Normal breath sounds.   Abdominal:      General: Bowel sounds are normal. There is no distension.      Palpations: Abdomen is soft.      Tenderness: There is no abdominal tenderness.   Musculoskeletal:         General: Swelling present.      Comments: 1+ bilateral lower extremity edema with venous stasis changes noted.  Left upper extremity fistula   Lymphadenopathy:      Head:      Right side of head: No submandibular adenopathy.      Cervical: No cervical adenopathy.      Upper Body:      Right upper body: No supraclavicular adenopathy.      Left upper body: No supraclavicular adenopathy.   Skin:     General: Skin is warm and dry.      Findings: No bruising or rash.   Neurological:      Mental Status: She is alert and oriented to person, place, and time.      Cranial Nerves: No cranial nerve deficit.      Motor: No abnormal muscle tone.      Deep Tendon Reflexes: Reflexes normal.   Psychiatric:         Behavior: Behavior normal.        Patient was examined today, unchanged from above    Result Review : " Reviewed CBC, CMP from today.  Reviewed pelvic MRI from 5/8/2023.  Reviewed procedure note and pathology from 5/5/2023.  Reviewed records from cardiology.       Assessment and Plan    *Metastatic lobular breast cancer (ER/IN positive, HER-2/tj negative):  · Previous stage IIIC right breast cancer in 2003, received neoadjuvant chemotherapy (unknown regimen) with subsequent bilateral mastectomies 1/22/2004 with right axillary dissection.  Residual 10-12 cm invasive lobular carcinoma on the right breast with 14/16+ nodes with extranodal extension.  Received adjuvant carboplatin and Navelbine chemotherapy x4 cycles  · Attempted adjuvant radiation to the chest wall however interrupted due to cellulitis that required removal of implants in August 2004  · Received tamoxifen from 6/22/2004 until June 2009.  Transitioned to Femara and received until June 2014  · Identification of CT findings concerning for carcinomatosis on CT scans and June 2019.  · PET scan confirmed hypermetabolic activity in the omentum as well as small retroperitoneal lymph nodes.  · CT-guided omental biopsy 7/30/2019 with metastatic lobular carcinoma of breast primary, ER positive (greater than 95%), IN positive (90%), HER-2/tj negative (1+ IHC)  · [facility] liquid biopsy 2/5/2020: MSI undetermined, mutations in BETH, NF1, CHEK2.  No evidence of PIK3CA mutation.  · Subsequent [facility] germline testing 11/12/2021 with BETH VUS (c.2864C>A) and POLE VUS (c.631A>T)  · Initiation of Aromasin and Ibrance in August 2019  · Difficulty with myelosuppression related to Ibrance requiring dose alterations, eventually changed to 100 mg for 7 days on followed by 7 days off.  · CT chest abdomen pelvis 10/26/2021 with increase in left hydronephrosis with increase in left perinephric and periureteral stranding. Decrease in stone in the left kidney lower pole (7 mm).  Stable small retroperitoneal lymph and left periaortic lymph nodes.  · PET scan 11/10/2021  with multiple bilateral hypermetabolic nodular pulmonary lesions, asymmetric moderate to severe left hydronephrosis with ill-defined fat stranding and soft tissue thickening in the renal sinus and surrounding the left ureter, hypermetabolic left retroperitoneal lymph node with slight increase in size, ill-defined hypermetabolic thickening in the inferior omentum with increase in size, uptake and C7 (indeterminate, recommended MRI).  Short segments of uptake in the mid and distal esophagus possibly related to gastritis.  · PET scan findings felt to be consistent with progressive malignancy.  Patient declined repeat omental biopsy.   · Options for further treatment discussed on 11/12/2021.  In light of renal failure and dialysis, options are more limited.  Recommendation to continue Ibrance and discontinue Aromasin, begin Faslodex.  Discussed possible future use of single agent Taxol.    · Aromasin discontinued 11/12/2021  · On 11/22/2021 initiation of Faslodex with continuation of Ibrance (100 mg daily for 7 days on followed by 7 days off).  · MRI cervical spine 11/18/2021 showed the right C7 normality to likely represent metastatic disease.  With solitary bone lesion, did not recommend pursuit of bone modifying agent.  · Following 2 cycles Faslodex/Ibrance, PET scan on 1/11/2022 showed no change in the soft tissue superior to the dome of the bladder with hypermetabolic activity (likely related to carcinomatosis).  Significant decrease in injection of fat around the left renal pelvis and stable retroperitoneal hypermetabolic lymph nodes, decrease in size and activity in small bilateral pulmonary nodules.  Findings felt to represent evidence of response to treatment.    · Ibrance held briefly beginning 1/28/2022 due to hospitalization with COVID-19 infection and C. difficile colitis.  Patient developed leukopenia/neutropenia.  Ibrance resumed at previous dosing (100 mg daily 7 days on followed by 7 days off) on  2/9/22.  · Following 5 cycles Faslodex/Ibrance PET scan 4/19/2022 with evidence of mixed response.  New hypermetabolic lesion spinous process L2 (SUV 5.1) and slight increase in activity left clavicular metastasis (SUV increased 4.6-8.1).  C7 hypermetabolic mixed lytic and blastic bone lesion was unchanged.  Decrease in uptake involving omental thickening.  Stable soft tissue thickening around the left renal pelvis and ureter.  Discussion again regarding potential pursuit of IV chemotherapy with Taxol versus continuation of Ibrance and Faslodex with radiation to sites of bony disease at L2, left clavicle, C7.  Patient opted to defer initiation of systemic chemotherapy and continue Ibrance/Faslodex with consideration of radiation.  · Initiated monthly Xgeva on 5/19/2022 (cleared with nephrology).  Xgeva subsequently held due to significant hypocalcemia.  Decision made to forego further Xgeva with persistent hypocalcemia.  · Palliative radiation received to lumbar spine regarding L2 metastasis 5/23- 6/7/2022  · Ibrance held during radiation therapy beginning 5/22/2022.  Ibrance resumed 6/16/2022.  · PET scan 7/21/2022 with stable hypermetabolic abdominal pelvic lymph nodes and subcarinal lymph node, stable bone lesions at C7, left clavicle.  Stable soft tissue thickening around the left renal pelvis with left hydronephrosis.  Uptake in adrenal glands felt to be likely reactive (no change in size).  No activity noted at L2 (previously radiated).  New hypermetabolic lesion right anterior sixth rib.  · With evidence of further slight progression in bone on PET scan (new right sixth rib lesion), on 7/28/2022 discontinued Ibrance and plan to transition to abemaciclib with continuation of Faslodex.    · On 8/4/2022 initiated abemaciclib at reduced dose of 50 mg twice daily.  · On 8/25/2022, increased abemaciclib dose to 100 mg twice daily  · PET scan 10/6/2022 with stable findings with exception of left acetabular activity  report noted new activity however on prior PET scan question of focal activity present previously.  No corresponding CT abnormality and significant increase in SUV at sacral lesion.  Scan otherwise stable.  Decision to continue current treatment  · Abemaciclib dose increased on 10/13/2022 up to 150 mg twice daily  · PET scan 12/29/2022 with artifactual accumulation of tracer right axilla and right mastectomy bed without visualized CT abnormality.  Hypermetabolic bone lesions with decrease in level of activity.  No new findings.  · PET scan 3/30/2023 with minimal/insignificant increase in activity in multiple bone lesions.  1 new focus of hypermetabolic activity left anterior acetabulum (SUV 10.7) however previously identified activity anterolateral left acetabulum is no longer present.  Subcutaneous fluid collection lateral right anterior chest wall nearly completely resolved.  · Due to continued clinical benefit from Faslodex/abemaciclib and with only 1 potential new finding in left acetabulum on PET scan, elected to continue current treatment at visit on 4/13/2023 and pursue MRI pelvis to evaluate left acetabulum with potential pursuit of radiation to solitary new left acetabular lesion.  · Abemaciclib held 4/13/2023 due to neutropenia (ANC 0.95) and thrombocytopenia (platelet count 93,000).  On 4/27/2023 resumed abemaciclib 150 mg in the morning and subsequently added 100 mg dose in the evening on 5/6/2023.    · Patient developed significant hematuria which had originally been interpreted as vaginal bleeding.  She then experienced migration and loss of her left ureteral stent spontaneously.  On 5/5/2023 she underwent replacement of left ureteral stent as well as cauterization of a bleeding site in the bladder.  She underwent at the same setting D&C with biopsy that showed no evidence of endometrium, minute fragment of benign endocervical mucosa.  Complete resolution of the hematuria.   · MRI pelvis 5/8/2023  showed multiple areas of metastatic involvement in the pelvis, largest lesion 2.7 cm involving the anterior left acetabulum (corresponding to the new PET positive lesion).  No prior comparison.  Additional subtle 1 cm left iliac wing, 1.9 cm S2, 1.7 cm right iliac crest, 1.4 cm left pubic ramus, and 2.7 cm posterior right acetabular lesions noted.  There were several other subcentimeter foci of abnormal marrow signal in the sacrum and right iliac bone of unclear significance.  · Patient returns today in follow-up due for cycle 19 Faslodex 500 mg IM every 4 weeks and continuing on abemaciclib 150 mg a.m., 100 mg p.m.  At the last visit, abemaciclib was held due to neutropenia and thrombocytopenia, was resumed on 4/27/2023 with recovery of counts with 150 mg dose in the morning and subsequently on 5/6/2023 added a reduced 100 mg dose in the evening.  Counts today are stable on the new dose with WBC 4.19, ANC 2.7, platelet count 144,000.  We are attempting to maintain the patient's current regimen and treat the new PET positive lesion in the left acetabulum with radiation therapy in order to delay as long as possible the transition to palliative chemotherapy.  The patient remains asymptomatic from her bony metastatic disease.  We reviewed the MRI findings which do show some surprising findings with more extensive metastatic disease than expected.  Nevertheless the only new PET positive lesion is in the left iliac region.  We do not have prior MRI for comparison so it is unclear how long the other lesions have been present.  Patient is seeing radiation oncology tomorrow and hopefully can proceed on with treatment shortly thereafter.  As long as we are radiating a small area of the pelvis, we will try to maintain the patient's systemic therapy with abemaciclib and monitor counts weekly.  We will proceed on with Faslodex as planned today.  She continues on home dialysis 5 days/week and tolerates this well.  Functional  status is stable, ECOG 2 with chronic limitations in her mobility with use of a motorized wheelchair.  Overall she reports that she is maintaining adequate quality of life on current treatment.  We will check weekly counts and I will see her in 4 weeks when she is due for cycle 20 Faslodex.  Pending timeframe to complete radiation we will discuss timing of follow-up PET scan.    *Anemia secondary to ESRD, underlying malignancy/chronic disease, myelosuppression from CDK 4/6 inhibitor, GI blood loss:  · Anemia secondary to ESRD, malignancy with exacerbation by use of Ibrance  · Previous evidence of folate deficiency 8/12/2019 with level 3.84, initiated folic acid 1 mg daily  · Previous evidence of iron deficiency, received Injectafer on 8/7/2020 750 mg IV x1 dose.  Second dose not administered due to onset of fever, subsequent iron studies precluded further administration of IV iron.  · Previous low normal B12 level initiated oral B12 1000 mcg daily.  · Patient had previously received Procrit and required intermittent transfusion support  · Following initiation of hemodialysis, management of BEAU transitioned to nephrology, receiving Epogen with dialysis.  · Ongoing transfusion support prompted alteration in Ibrance dosing on 8/23/2021.  · After alteration in Ibrance dosing, hemoglobin improved and remained stable in the 9-10 range.  · Improved but ongoing intermittent need for transfusion support despite Ibrance dose alteration  · Stool negative for occult blood x3 on 11/2/2021  · Patient with reduced dialysis frequency to 2 days/week with concurrent decrease in Epogen dosing corresponding again to increased transfusion requirement.  · Epogen dose increased per nephrology to 20,000 units twice weekly with dialysis on Mondays and Fridays  · Ibrance again exacerbating anemia with ongoing intermittent transfusion requirements.  Ibrance subsequently discontinued on 7/28/2022 and patient initiated abemaciclib on 8/4/2022  which may be less myelosuppressive.  · Additional transfusions required with hemoglobin on 3/31/2022 6.6, hemoglobin 4/21/2022 of 6.4, hemoglobin on 5/5/2022 of 6.8, hemoglobin 6.4 on 7/14/2022, hemoglobin 6.4 on 7/28/2022, hemoglobin 6.8 on 8/18/2022.  · Patient did develop transient visible blood in stool in July 2022  · Anemia evaluation 7/28/2022 with iron 32, ferritin 412, iron saturation 15%, TIBC 210, folate 19.9, B12 925, haptoglobin 300, .  · Stool positive for occult blood x3 on 8/1/2022  · Patient was seen by GI on 8/16/2022, felt to be high risk for endoscopic evaluation and elected to forego EGD/colonoscopy for now  · Patient transitioned from hemodialysis in clinic to home hemodialysis 5 days/week x 2 hours beginning 8/29/2022.  With transition to home dialysis, nephrology transition the patient from Epogen to Mircera administered every 4 weeks in their office, initiated 8/22/2022.  · Symptomatic hemoglobin 7.3 on 2/16/2023, received transfusion  · Additional labs on 3/16/2023 with B12 1146, folate greater than 20  · Patient with significant gross hematuria with left ureteral stent migration and eventual loss.  Anemia worsened during this timeframe requiring transfusion support on 4/20/2023 with hemoglobin 6.1 and 5/3/2023 with hemoglobin 6.3.  · Patient today returns with improvement in hemoglobin up to 7.5.  Her hematuria has resolved completely.  She continues to receive monthly Mircera under the direction of nephrology and is due for her next treatment on 5/16/2023.  We will continue to monitor counts weekly and transfuse for hemoglobin less than 7.0.    *ESRD on HD:  · Patient with previous CKD3/4  · Hospitalization 4/29-5/4/2021 with RYAN/CKD, UTI, anemia.  Required initiation of hemodialysis Tuesday Thursday Saturday.   · Decrease in frequency of dialysis to twice per week.  She continues to produce a significant amount of urine.   · Status post left upper extremity AV fistula placement on  11/3/2021 by vascular surgery  · Attempt to hold further dialysis on 1/11/2022 with close monitoring of her laboratory and clinical parameters.  Dialysis quickly resumed due to development of hyperkalemia.  · Patient was undergoing dialysis 2 days/week on Mondays and Fridays.    · On 8/22/2022 patient transitioned to use of home dialysis device 5 days/week x 2 hours.  · Patient continues on home hemodialysis 5 days/week.  With recent MRI she did pursue dialysis immediately after the scan was performed and tolerated this well.    *CHF with severe aortic stenosis:  · Echocardiogram 7/12/2019 showing ejection fraction 48% with moderate dilation of the LV cavity, global hypokinesis, grade 2 diastolic dysfunction, mild to moderate aortic stenosis, trivial pericardial effusion.  · Echocardiogram 7/19/2021 with ejection fraction 56%, left atrial volume moderately increased, grade 2 diastolic dysfunction, severe calcification aortic valve, moderate aortic stenosis, severe mitral calcification, mild mitral stenosis, marked elevation in RVSP from tricuspid regurgitation.  · Echocardiogram on 7/13/2022 with ejection fraction 56-60%, grade 2 diastolic dysfunction, severe aortic stenosis.    · Cardiac catheterization 8/23/2022 showed normal coronary arteries, mild to moderate pulmonary hypertension.    · She was evaluated by cardiothoracic surgery Dr. Pagan and there was discussion regarding potential candidacy for TAVR although there was concern given her underlying comorbidities including her metastatic breast cancer.  I discussed patient's prognosis with Dr. Pagan.  She does have opportunities for additional treatment of her malignancy with intravenous chemotherapy and is willing to pursue this in the future when needed.  It is unclear as to her expected survival however given her multiple severe comorbidities with metastatic breast cancer, ESRD on dialysis, severe aortic stenosis, severe anemia.  There is consideration of  possible pursuit of balloon valvuloplasty initially to assess her symptomatic response and then consider pursuit of TAVR in the future.   · Patient did undergo aortic valvuloplasty during admission 1/10 - 1/11/2023.  · Patient was seen earlier today by cardiology, has been stable.    *Left upper and bilateral lower extremity peripheral neuropathy:  · Patient reports that left upper extremity neuropathy was not present from previous chemotherapy but occurred after hospitalization that required intubation and critical care stay.  Apparently felt to represent critical care neuropathy.  Improved over time.  · Bilateral lower extremity neuropathy felt to be related to diabetes  · May have future implications regarding treatment for breast cancer.  · Patient reports that peripheral neuropathy symptoms continue in the bilateral lower extremities, unchanged.  This will be a consideration with potential future use of Taxol    *Uterine/endometrial activity on PET scan:  · Patient experienced postmenopausal vaginal bleeding in 2013, negative endometrial biopsy and gynecologic evaluation.  · With findings on PET scan with enlarging uterus and some hypermetabolic activity, referred back to Dr. Oliveros in gynecology.    · 9/19/2019 D&C with hysteroscopy by Dr. Oliveros, no significant intraoperative findings, pathologic results with benign findings, no evidence of malignancy.  · Patient with question of vaginal bleeding however subsequently became evident that this was hematuria.  She had been referred to gynecology, did undergo on 5/5/2023 D&C at the same time as cystoscopy, biopsy with no evidence of malignancy but no endometrium present.    *Nausea:  · Mild, relieved with Zofran.   · Patient experienced improvement in nausea after initiating hemodialysis  · No recent nausea    *Myelosuppression secondary to CDK 4/6 inhibitor:  · Patient was able to maintain adequate WBC after dosing alteration on Ibrance to treatment at 100 mg  daily for 7 days on followed by 7 days off.  · Subsequent transition from Ibrance to abemaciclib  · Overall counts improved on abemaciclib  · Patient with decline in counts felt to be secondary to abemaciclib.  On 4/13/2023 abemaciclib held with WBC 1.82/ANC 0.95, platelet count 93,000. On 4/27/2023 resumed abemaciclib 150 mg in the morning and subsequently added 100 mg dose in the evening on 5/6/2023.  · Today, labs with WBC 4.19, ANC 2.7, platelet count 144,000.  Continue for now weekly monitoring of counts on reduced dose of abemaciclib    *Depression  · Patient with history of depression, worsened after the death of her son in November 2021  · With evidence of progressive malignancy in November 2021, patient agreeable to referral to supportive oncology  · Patient currently receiving gabapentin 300 mg nightly, Zoloft 150 mg daily  · Patient does continue with difficulties regarding depression that wax and wane.  Currently symptoms under fair control.  Patient was previously seeing supportive oncology.  She does not feel that she needs further follow-up at this time however would not rule out returning to see supportive oncology in the future if needed.  We will prescribe her Zoloft at this point.  She remains off of armodafinil.    *Left perinephric stranding secondary to malignancy with hydronephrosis:  · CT 4/22/2021 showed interval enlargement of 2 staghorn calculi in the left kidney with persistent left perinephric stranding  · Hospitalization 4/29-5/4/2021 with RYAN/CKD, UTI, anemia.  Required 2 unit PRBC transfusion and initiated hemodialysis Tuesday Thursday Saturday.    · Discussion from urology regarding potential need for surgical procedure to address staghorn calculus left kidney  · CT scan 7/2/2021 with only 1 remaining left renal stone identified which had decreased in size.  Patient appears to have passed the other stone.  · As above, CT 10/26/2021 with more prominent left hydronephrosis and increase  in left perinephric and periureteral stranding.  Felt to possibly be related to passage of recent stone versus partial obstruction secondary to debris or thrombus in the left ureter versus pyelonephritis.  Patient however with no clinical evidence of recent stone passage nor pyelonephritis. Malignancy felt to be the cause of CT changes around the left kidney at this point.   · Left ureteral stent replacement 12/16/2021  · PET scan 1/11/2022 with decrease in fat injection near left renal pelvis, stent in satisfactory position.  Mild residual hydronephrosis on the left.  · Ureteral stent replaced on 3/10/2022  · PET scan 4/19/2022 with no hydronephrosis, left ureteral stent in place  · PET scan 7/21/2022 with persistent left hydronephrosis, ureteral stent in place  · PET scan 10/6/2022 with left nephroureteral stent in place, overall stable findings  · PET scan 12/29/2022 with left nephroureteral stent remaining in place, stable findings  · PET scan 3/30/2023 with left ureteral stent in place.  · Patient developed gross hematuria with migration and spontaneous loss of left ureteral stent.  Patient underwent stent replacement on 5/5/2023.  Cauterization of bleeding focus in the bladder.  Resolution of hematuria.    *Right thyroid nodules  · Patient underwent prior thyroid biopsy by her report with benign findings many years ago, records not available  · On MRI cervical spine 11/18/2021, finding of 1.8 cm right thyroid nodule  · Thyroid ultrasound 11/26/2021 with right-sided thyroid nodules, 1 nodule was hypoechoic, solid measuring 2 cm with biopsy recommended.  · Thyroid ultrasound results reviewed with patient.  In light of her metastatic breast cancer, renal failure the significance of the thyroid nodule is felt to be much less.  We discussed possibility of thyroid biopsy however patient is inclined to forego this, especially since she has had a biopsy performed in the past with benign findings by her report.    · No  hypermetabolic activity identified on PET scan 1/11/2022 in the neck    *Hospitalization 1/28-1/30/2022 due to COVID-19 infection and C. difficile colitis  · Patient fully vaccinated with 3 doses Moderna COVID-19 vaccine  · Patient developed symptoms 1/26/2022 with abrupt onset sinus congestion, mild cough, subsequently developed nausea and diarrhea on 1/27/2022.  Significant fatigue.  · Patient tested positive in emergency department on 1/28/2022 and was admitted  · Patient maintained O2 saturation in mid 90% range on room air  · Patient received remdesivir  · Patient was also found to be positive for C. difficile colitis, received course of oral vancomycin.  · Symptoms from both COVID-19 and C. difficile colitis ultimately resolved.    *Hypocalcemia  · Patient developed significant hypocalcemia after receiving Xgeva on 5/19/2022  · Calcium management per nephrology  · No further Xgeva planned due to persistent significant hypocalcemia  · Calcium today 9.3    Plan:  1. Patient will proceed with Faslodex 500 mg IM injection today and continue every 28 days (today is cycle 19)  2. Continue abemaciclib 150 mg a.m., 100 mg p.m.  3. Patient is receiving Mircera under the direction of nephrology, dosing every 4 weeks (due next on 5/16/2023) as well as IV iron intermittently as needed (none recently).  4. Continue to monitor hemoglobin closely regarding ongoing transfusion needs for hemoglobin less than 7.0.   5. Patient is scheduled to see radiation oncology on 5/12/2023 to consider palliative radiation to PET avid left anterior acetabulum lesion.  6. In 1, 2, 3 weeks CBC and RN review  7. MD visit in 4 weeks with CBC, CMP and patient will be due for cycle 20 Faslodex.  We will discuss recommendations for further follow-up imaging pending timeframe for completion of radiation therapy.    Patient continues on high risk medication requiring intensive monitoring.    I did spend 45 minutes in total time caring for patient  today, time spent in review of records, face-to-face consultation, coordination of care, placement of orders, completion of documentation.

## 2023-05-11 ENCOUNTER — OFFICE VISIT (OUTPATIENT)
Dept: CARDIOLOGY | Facility: CLINIC | Age: 63
End: 2023-05-11
Payer: COMMERCIAL

## 2023-05-11 ENCOUNTER — TELEPHONE (OUTPATIENT)
Dept: RADIATION ONCOLOGY | Facility: HOSPITAL | Age: 63
End: 2023-05-11
Payer: COMMERCIAL

## 2023-05-11 ENCOUNTER — INFUSION (OUTPATIENT)
Dept: ONCOLOGY | Facility: HOSPITAL | Age: 63
End: 2023-05-11
Payer: COMMERCIAL

## 2023-05-11 ENCOUNTER — OFFICE VISIT (OUTPATIENT)
Dept: ONCOLOGY | Facility: CLINIC | Age: 63
End: 2023-05-11
Payer: COMMERCIAL

## 2023-05-11 ENCOUNTER — LAB (OUTPATIENT)
Dept: LAB | Facility: HOSPITAL | Age: 63
End: 2023-05-11
Payer: COMMERCIAL

## 2023-05-11 VITALS
WEIGHT: 293 LBS | HEART RATE: 71 BPM | HEIGHT: 67 IN | DIASTOLIC BLOOD PRESSURE: 62 MMHG | BODY MASS INDEX: 45.99 KG/M2 | SYSTOLIC BLOOD PRESSURE: 118 MMHG

## 2023-05-11 VITALS
SYSTOLIC BLOOD PRESSURE: 95 MMHG | OXYGEN SATURATION: 100 % | HEART RATE: 77 BPM | WEIGHT: 293 LBS | HEIGHT: 67 IN | TEMPERATURE: 98.2 F | RESPIRATION RATE: 18 BRPM | BODY MASS INDEX: 45.99 KG/M2 | DIASTOLIC BLOOD PRESSURE: 62 MMHG

## 2023-05-11 DIAGNOSIS — D63.1 ANEMIA IN STAGE 3 CHRONIC KIDNEY DISEASE, UNSPECIFIED WHETHER STAGE 3A OR 3B CKD: Primary | ICD-10-CM

## 2023-05-11 DIAGNOSIS — N18.30 ANEMIA IN STAGE 3 CHRONIC KIDNEY DISEASE, UNSPECIFIED WHETHER STAGE 3A OR 3B CKD: ICD-10-CM

## 2023-05-11 DIAGNOSIS — Q23.1 BICUSPID AORTIC VALVE: Primary | ICD-10-CM

## 2023-05-11 DIAGNOSIS — C50.919 PRIMARY MALIGNANT NEOPLASM OF BREAST WITH METASTASIS: ICD-10-CM

## 2023-05-11 DIAGNOSIS — D63.1 ANEMIA IN STAGE 3 CHRONIC KIDNEY DISEASE, UNSPECIFIED WHETHER STAGE 3A OR 3B CKD: ICD-10-CM

## 2023-05-11 DIAGNOSIS — I35.0 NONRHEUMATIC AORTIC VALVE STENOSIS: ICD-10-CM

## 2023-05-11 DIAGNOSIS — C50.919 PRIMARY MALIGNANT NEOPLASM OF BREAST WITH METASTASIS: Primary | ICD-10-CM

## 2023-05-11 DIAGNOSIS — N18.30 ANEMIA IN STAGE 3 CHRONIC KIDNEY DISEASE, UNSPECIFIED WHETHER STAGE 3A OR 3B CKD: Primary | ICD-10-CM

## 2023-05-11 LAB
ALBUMIN SERPL-MCNC: 3.1 G/DL (ref 3.5–5.2)
ALBUMIN/GLOB SERPL: 0.7 G/DL (ref 1.1–2.4)
ALP SERPL-CCNC: 109 U/L (ref 38–116)
ALT SERPL W P-5'-P-CCNC: <5 U/L (ref 0–33)
ANION GAP SERPL CALCULATED.3IONS-SCNC: 10.2 MMOL/L (ref 5–15)
AST SERPL-CCNC: 19 U/L (ref 0–32)
BASOPHILS # BLD AUTO: 0.05 10*3/MM3 (ref 0–0.2)
BASOPHILS NFR BLD AUTO: 1.2 % (ref 0–1.5)
BILIRUB SERPL-MCNC: 0.5 MG/DL (ref 0.2–1.2)
BUN SERPL-MCNC: 32 MG/DL (ref 6–20)
BUN/CREAT SERPL: 6.2 (ref 7.3–30)
CALCIUM SPEC-SCNC: 9.3 MG/DL (ref 8.5–10.2)
CHLORIDE SERPL-SCNC: 99 MMOL/L (ref 98–107)
CO2 SERPL-SCNC: 27.8 MMOL/L (ref 22–29)
CREAT SERPL-MCNC: 5.17 MG/DL (ref 0.6–1.1)
DEPRECATED RDW RBC AUTO: 68.4 FL (ref 37–54)
EGFRCR SERPLBLD CKD-EPI 2021: 8.8 ML/MIN/1.73
EOSINOPHIL # BLD AUTO: 0.48 10*3/MM3 (ref 0–0.4)
EOSINOPHIL NFR BLD AUTO: 11.5 % (ref 0.3–6.2)
ERYTHROCYTE [DISTWIDTH] IN BLOOD BY AUTOMATED COUNT: 16.9 % (ref 12.3–15.4)
GLOBULIN UR ELPH-MCNC: 4.4 GM/DL (ref 1.8–3.5)
GLUCOSE SERPL-MCNC: 238 MG/DL (ref 74–124)
HCT VFR BLD AUTO: 25.5 % (ref 34–46.6)
HGB BLD-MCNC: 7.5 G/DL (ref 12–15.9)
IMM GRANULOCYTES # BLD AUTO: 0.01 10*3/MM3 (ref 0–0.05)
IMM GRANULOCYTES NFR BLD AUTO: 0.2 % (ref 0–0.5)
LYMPHOCYTES # BLD AUTO: 0.64 10*3/MM3 (ref 0.7–3.1)
LYMPHOCYTES NFR BLD AUTO: 15.3 % (ref 19.6–45.3)
MCH RBC QN AUTO: 32.8 PG (ref 26.6–33)
MCHC RBC AUTO-ENTMCNC: 29.4 G/DL (ref 31.5–35.7)
MCV RBC AUTO: 111.4 FL (ref 79–97)
MONOCYTES # BLD AUTO: 0.31 10*3/MM3 (ref 0.1–0.9)
MONOCYTES NFR BLD AUTO: 7.4 % (ref 5–12)
NEUTROPHILS NFR BLD AUTO: 2.7 10*3/MM3 (ref 1.7–7)
NEUTROPHILS NFR BLD AUTO: 64.4 % (ref 42.7–76)
NRBC BLD AUTO-RTO: 0 /100 WBC (ref 0–0.2)
PLATELET # BLD AUTO: 144 10*3/MM3 (ref 140–450)
PMV BLD AUTO: 9.2 FL (ref 6–12)
POTASSIUM SERPL-SCNC: 4.9 MMOL/L (ref 3.5–4.7)
PROT SERPL-MCNC: 7.5 G/DL (ref 6.3–8)
RBC # BLD AUTO: 2.29 10*6/MM3 (ref 3.77–5.28)
SODIUM SERPL-SCNC: 137 MMOL/L (ref 134–145)
WBC NRBC COR # BLD: 4.19 10*3/MM3 (ref 3.4–10.8)

## 2023-05-11 PROCEDURE — 99214 OFFICE O/P EST MOD 30 MIN: CPT | Performed by: INTERNAL MEDICINE

## 2023-05-11 PROCEDURE — 93000 ELECTROCARDIOGRAM COMPLETE: CPT | Performed by: INTERNAL MEDICINE

## 2023-05-11 PROCEDURE — 25010000002 FULVESTRANT PER 25 MG: Performed by: INTERNAL MEDICINE

## 2023-05-11 PROCEDURE — 36415 COLL VENOUS BLD VENIPUNCTURE: CPT

## 2023-05-11 PROCEDURE — 96402 CHEMO HORMON ANTINEOPL SQ/IM: CPT

## 2023-05-11 PROCEDURE — 85025 COMPLETE CBC W/AUTO DIFF WBC: CPT

## 2023-05-11 PROCEDURE — 80053 COMPREHEN METABOLIC PANEL: CPT

## 2023-05-11 RX ORDER — LAMOTRIGINE 25 MG/1
500 TABLET ORAL ONCE
Status: COMPLETED | OUTPATIENT
Start: 2023-05-11 | End: 2023-05-11

## 2023-05-11 RX ORDER — LAMOTRIGINE 25 MG/1
500 TABLET ORAL ONCE
Status: CANCELLED
Start: 2023-05-11 | End: 2023-05-11

## 2023-05-11 RX ADMIN — FULVESTRANT 500 MG: 50 INJECTION INTRAMUSCULAR at 14:27

## 2023-05-11 NOTE — LETTER
May 11, 2023     Kiana (Avila) BETY Noriega  [address]  [address]  [facility] [address]    Patient: Juana Hall   YOB: 1960   Date of Visit: 5/11/2023       Dear BETY Steven:    Thank you for referring Juana Hall to me for evaluation. Below are the relevant portions of my assessment and plan of care.    If you have questions, please do not hesitate to call me. I look forward to following Juana along with you.         Sincerely,        Leodan Irvin MD        CC: MD Leodan Acosta Jr., MD Christopher A Semder, MD Crystal H. McMahan, MD Huber, John L Jr., MD  05/11/23 [id number]  Sign when Signing Visit  Chief Complaint  Metastatic lobular breast cancer (ER/MN positive, HER-2/tj negative), anemia secondary to CKD3, CHF, peripheral neuropathy, chemotherapy related nausea chemotherapy-induced myelosuppression    Subjective         History of Present Illness  The patient returns today in follow-up continuing on monthly Faslodex due for cycle 19 today and continuing on abemaciclib.  She did have significant myelosuppression from abemaciclib at 150 mg twice daily and treatment was held beginning 4/13/2023 due to neutropenia and thrombocytopenia.  Patient experienced recovery of counts and on 4/27/2023 we resumed abemaciclib 150 mg in the morning.  She did receive new dose of 100 mg tablets and began taking this in the evening on 5/6/2023.  In the interval, the patient developed significant hematuria which had originally been interpreted as vaginal bleeding.  She then experienced migration and loss of her left ureteral stent spontaneously.  On 5/5/2023 she underwent replacement of left ureteral stent as well as cauterization of a bleeding site in the bladder.  She underwent at the same setting D&C with biopsy that showed no evidence of endometrium, minute fragment of benign endocervical mucosa.  Patient experienced complete resolution of the hematuria.  She  "remains asymptomatic in regards to the left ureteral stent.  She remains asymptomatic as well in regards to the left acetabular metastasis.  She had been referred at the last visit to radiation oncology to consider palliative radiation to new PET activity in this area.  We did proceed with MRI of the pelvis to confirm presence of metastatic lesion which we are reviewing today.  She is scheduled to see radiation oncology tomorrow.  She was seen as well by cardiology earlier today and felt to be doing well in regards to her aortic stenosis status post TAVR.  She has no new complaints today, continues in a motorized wheelchair.  She continues with home dialysis 5 days/week and tolerates this well.  She is due for monthly Mircera with nephrology on 5/16/2023.  She did require transfusion support recently for hemoglobin down to 6.1 on 4/20/2023 and 6.3 on 5/3/2023 which was felt to be related to significant hematuria at the time.    Objective    Vital Signs:   BP 95/62   Pulse 77   Temp 98.2 °F (36.8 °C) (Temporal)   Resp 18   Ht 170.2 cm (67.01\")   Wt (!) 167 kg (368 lb 2.7 oz)   SpO2 100%   BMI 57.65 kg/m²     Physical Exam  Constitutional:       Appearance: She is well-developed. She is obese.      Comments: Patient is in a motorized scooter today   Eyes:      Conjunctiva/sclera: Conjunctivae normal.   Neck:      Thyroid: No thyromegaly.   Cardiovascular:      Rate and Rhythm: Normal rate and regular rhythm.      Heart sounds: Murmur heard.     No friction rub. No gallop.      Comments: 2/6 murmur  Pulmonary:      Effort: No respiratory distress.      Breath sounds: Normal breath sounds.   Abdominal:      General: Bowel sounds are normal. There is no distension.      Palpations: Abdomen is soft.      Tenderness: There is no abdominal tenderness.   Musculoskeletal:         General: Swelling present.      Comments: 1+ bilateral lower extremity edema with venous stasis changes noted.  Left upper extremity fistula "   Lymphadenopathy:      Head:      Right side of head: No submandibular adenopathy.      Cervical: No cervical adenopathy.      Upper Body:      Right upper body: No supraclavicular adenopathy.      Left upper body: No supraclavicular adenopathy.   Skin:     General: Skin is warm and dry.      Findings: No bruising or rash.   Neurological:      Mental Status: She is alert and oriented to person, place, and time.      Cranial Nerves: No cranial nerve deficit.      Motor: No abnormal muscle tone.      Deep Tendon Reflexes: Reflexes normal.   Psychiatric:         Behavior: Behavior normal.        Patient was examined today, unchanged from above    Result Review : Reviewed CBC, CMP from today.  Reviewed pelvic MRI from 5/8/2023.  Reviewed procedure note and pathology from 5/5/2023.  Reviewed records from cardiology.      Assessment and Plan    *Metastatic lobular breast cancer (ER/AZ positive, HER-2/tj negative):  Previous stage IIIC right breast cancer in 2003, received neoadjuvant chemotherapy (unknown regimen) with subsequent bilateral mastectomies 1/22/2004 with right axillary dissection.  Residual 10-12 cm invasive lobular carcinoma on the right breast with 14/16+ nodes with extranodal extension.  Received adjuvant carboplatin and Navelbine chemotherapy x4 cycles  Attempted adjuvant radiation to the chest wall however interrupted due to cellulitis that required removal of implants in August 2004  Received tamoxifen from 6/22/2004 until June 2009.  Transitioned to Femara and received until June 2014  Identification of CT findings concerning for carcinomatosis on CT scans and June 2019.  PET scan confirmed hypermetabolic activity in the omentum as well as small retroperitoneal lymph nodes.  CT-guided omental biopsy 7/30/2019 with metastatic lobular carcinoma of breast primary, ER positive (greater than 95%), AZ positive (90%), HER-2/tj negative (1+ IHC)  Foundation medicine liquid biopsy 2/5/2020: MSI undetermined,  mutations in BETH, NF1, CHEK2.  No evidence of PIK3CA mutation.  Subsequent [facility] germline testing 11/12/2021 with BETH VUS (c.2864C>A) and POLE VUS (c.631A>T)  Initiation of Aromasin and Ibrance in August 2019  Difficulty with myelosuppression related to Ibrance requiring dose alterations, eventually changed to 100 mg for 7 days on followed by 7 days off.  CT chest abdomen pelvis 10/26/2021 with increase in left hydronephrosis with increase in left perinephric and periureteral stranding. Decrease in stone in the left kidney lower pole (7 mm).  Stable small retroperitoneal lymph and left periaortic lymph nodes.  PET scan 11/10/2021 with multiple bilateral hypermetabolic nodular pulmonary lesions, asymmetric moderate to severe left hydronephrosis with ill-defined fat stranding and soft tissue thickening in the renal sinus and surrounding the left ureter, hypermetabolic left retroperitoneal lymph node with slight increase in size, ill-defined hypermetabolic thickening in the inferior omentum with increase in size, uptake and C7 (indeterminate, recommended MRI).  Short segments of uptake in the mid and distal esophagus possibly related to gastritis.  PET scan findings felt to be consistent with progressive malignancy.  Patient declined repeat omental biopsy.   Options for further treatment discussed on 11/12/2021.  In light of renal failure and dialysis, options are more limited.  Recommendation to continue Ibrance and discontinue Aromasin, begin Faslodex.  Discussed possible future use of single agent Taxol.    Aromasin discontinued 11/12/2021  On 11/22/2021 initiation of Faslodex with continuation of Ibrance (100 mg daily for 7 days on followed by 7 days off).  MRI cervical spine 11/18/2021 showed the right C7 normality to likely represent metastatic disease.  With solitary bone lesion, did not recommend pursuit of bone modifying agent.  Following 2 cycles Faslodex/Ibrance, PET scan on 1/11/2022 showed no change in  the soft tissue superior to the dome of the bladder with hypermetabolic activity (likely related to carcinomatosis).  Significant decrease in injection of fat around the left renal pelvis and stable retroperitoneal hypermetabolic lymph nodes, decrease in size and activity in small bilateral pulmonary nodules.  Findings felt to represent evidence of response to treatment.    Ibrance held briefly beginning 1/28/2022 due to hospitalization with COVID-19 infection and C. difficile colitis.  Patient developed leukopenia/neutropenia.  Ibrance resumed at previous dosing (100 mg daily 7 days on followed by 7 days off) on 2/9/22.  Following 5 cycles Faslodex/Ibrance PET scan 4/19/2022 with evidence of mixed response.  New hypermetabolic lesion spinous process L2 (SUV 5.1) and slight increase in activity left clavicular metastasis (SUV increased 4.6-8.1).  C7 hypermetabolic mixed lytic and blastic bone lesion was unchanged.  Decrease in uptake involving omental thickening.  Stable soft tissue thickening around the left renal pelvis and ureter.  Discussion again regarding potential pursuit of IV chemotherapy with Taxol versus continuation of Ibrance and Faslodex with radiation to sites of bony disease at L2, left clavicle, C7.  Patient opted to defer initiation of systemic chemotherapy and continue Ibrance/Faslodex with consideration of radiation.  Initiated monthly Xgeva on 5/19/2022 (cleared with nephrology).  Xgeva subsequently held due to significant hypocalcemia.  Decision made to forego further Xgeva with persistent hypocalcemia.  Palliative radiation received to lumbar spine regarding L2 metastasis 5/23- 6/7/2022  Ibrance held during radiation therapy beginning 5/22/2022.  Ibrance resumed 6/16/2022.  PET scan 7/21/2022 with stable hypermetabolic abdominal pelvic lymph nodes and subcarinal lymph node, stable bone lesions at C7, left clavicle.  Stable soft tissue thickening around the left renal pelvis with left  hydronephrosis.  Uptake in adrenal glands felt to be likely reactive (no change in size).  No activity noted at L2 (previously radiated).  New hypermetabolic lesion right anterior sixth rib.  With evidence of further slight progression in bone on PET scan (new right sixth rib lesion), on 7/28/2022 discontinued Ibrance and plan to transition to abemaciclib with continuation of Faslodex.    On 8/4/2022 initiated abemaciclib at reduced dose of 50 mg twice daily.  On 8/25/2022, increased abemaciclib dose to 100 mg twice daily  PET scan 10/6/2022 with stable findings with exception of left acetabular activity report noted new activity however on prior PET scan question of focal activity present previously.  No corresponding CT abnormality and significant increase in SUV at sacral lesion.  Scan otherwise stable.  Decision to continue current treatment  Abemaciclib dose increased on 10/13/2022 up to 150 mg twice daily  PET scan 12/29/2022 with artifactual accumulation of tracer right axilla and right mastectomy bed without visualized CT abnormality.  Hypermetabolic bone lesions with decrease in level of activity.  No new findings.  PET scan 3/30/2023 with minimal/insignificant increase in activity in multiple bone lesions.  1 new focus of hypermetabolic activity left anterior acetabulum (SUV 10.7) however previously identified activity anterolateral left acetabulum is no longer present.  Subcutaneous fluid collection lateral right anterior chest wall nearly completely resolved.  Due to continued clinical benefit from Faslodex/abemaciclib and with only 1 potential new finding in left acetabulum on PET scan, elected to continue current treatment at visit on 4/13/2023 and pursue MRI pelvis to evaluate left acetabulum with potential pursuit of radiation to solitary new left acetabular lesion.  Abemaciclib held 4/13/2023 due to neutropenia (ANC 0.95) and thrombocytopenia (platelet count 93,000).  On 4/27/2023 resumed  abemaciclib 150 mg in the morning and subsequently added 100 mg dose in the evening on 5/6/2023.    Patient developed significant hematuria which had originally been interpreted as vaginal bleeding.  She then experienced migration and loss of her left ureteral stent spontaneously.  On 5/5/2023 she underwent replacement of left ureteral stent as well as cauterization of a bleeding site in the bladder.  She underwent at the same setting D&C with biopsy that showed no evidence of endometrium, minute fragment of benign endocervical mucosa.  Complete resolution of the hematuria.   MRI pelvis 5/8/2023 showed multiple areas of metastatic involvement in the pelvis, largest lesion 2.7 cm involving the anterior left acetabulum (corresponding to the new PET positive lesion).  No prior comparison.  Additional subtle 1 cm left iliac wing, 1.9 cm S2, 1.7 cm right iliac crest, 1.4 cm left pubic ramus, and 2.7 cm posterior right acetabular lesions noted.  There were several other subcentimeter foci of abnormal marrow signal in the sacrum and right iliac bone of unclear significance.  Patient returns today in follow-up due for cycle 19 Faslodex 500 mg IM every 4 weeks and continuing on abemaciclib 150 mg a.m., 100 mg p.m.  At the last visit, abemaciclib was held due to neutropenia and thrombocytopenia, was resumed on 4/27/2023 with recovery of counts with 150 mg dose in the morning and subsequently on 5/6/2023 added a reduced 100 mg dose in the evening.  Counts today are stable on the new dose with WBC 4.19, ANC 2.7, platelet count 144,000.  We are attempting to maintain the patient's current regimen and treat the new PET positive lesion in the left acetabulum with radiation therapy in order to delay as long as possible the transition to palliative chemotherapy.  The patient remains asymptomatic from her bony metastatic disease.  We reviewed the MRI findings which do show some surprising findings with more extensive metastatic  disease than expected.  Nevertheless the only new PET positive lesion is in the left iliac region.  We do not have prior MRI for comparison so it is unclear how long the other lesions have been present.  Patient is seeing radiation oncology tomorrow and hopefully can proceed on with treatment shortly thereafter.  As long as we are radiating a small area of the pelvis, we will try to maintain the patient's systemic therapy with abemaciclib and monitor counts weekly.  We will proceed on with Faslodex as planned today.  She continues on home dialysis 5 days/week and tolerates this well.  Functional status is stable, ECOG 2 with chronic limitations in her mobility with use of a motorized wheelchair.  Overall she reports that she is maintaining adequate quality of life on current treatment.  We will check weekly counts and I will see her in 4 weeks when she is due for cycle 20 Faslodex.  Pending timeframe to complete radiation we will discuss timing of follow-up PET scan.    *Anemia secondary to ESRD, underlying malignancy/chronic disease, myelosuppression from CDK 4/6 inhibitor, GI blood loss:  Anemia secondary to ESRD, malignancy with exacerbation by use of Ibrance  Previous evidence of folate deficiency 8/12/2019 with level 3.84, initiated folic acid 1 mg daily  Previous evidence of iron deficiency, received Injectafer on 8/7/2020 750 mg IV x1 dose.  Second dose not administered due to onset of fever, subsequent iron studies precluded further administration of IV iron.  Previous low normal B12 level initiated oral B12 1000 mcg daily.  Patient had previously received Procrit and required intermittent transfusion support  Following initiation of hemodialysis, management of BEAU transitioned to nephrology, receiving Epogen with dialysis.  Ongoing transfusion support prompted alteration in Ibrance dosing on 8/23/2021.  After alteration in Ibrance dosing, hemoglobin improved and remained stable in the 9-10 range.  Improved  but ongoing intermittent need for transfusion support despite Ibrance dose alteration  Stool negative for occult blood x3 on 11/2/2021  Patient with reduced dialysis frequency to 2 days/week with concurrent decrease in Epogen dosing corresponding again to increased transfusion requirement.  Epogen dose increased per nephrology to 20,000 units twice weekly with dialysis on Mondays and Fridays  Ibrance again exacerbating anemia with ongoing intermittent transfusion requirements.  Ibrance subsequently discontinued on 7/28/2022 and patient initiated abemaciclib on 8/4/2022 which may be less myelosuppressive.  Additional transfusions required with hemoglobin on 3/31/2022 6.6, hemoglobin 4/21/2022 of 6.4, hemoglobin on 5/5/2022 of 6.8, hemoglobin 6.4 on 7/14/2022, hemoglobin 6.4 on 7/28/2022, hemoglobin 6.8 on 8/18/2022.  Patient did develop transient visible blood in stool in July 2022  Anemia evaluation 7/28/2022 with iron 32, ferritin 412, iron saturation 15%, TIBC 210, folate 19.9, B12 925, haptoglobin 300, .  Stool positive for occult blood x3 on 8/1/2022  Patient was seen by GI on 8/16/2022, felt to be high risk for endoscopic evaluation and elected to forego EGD/colonoscopy for now  Patient transitioned from hemodialysis in clinic to home hemodialysis 5 days/week x 2 hours beginning 8/29/2022.  With transition to home dialysis, nephrology transition the patient from Epogen to Mircera administered every 4 weeks in their office, initiated 8/22/2022.  Symptomatic hemoglobin 7.3 on 2/16/2023, received transfusion  Additional labs on 3/16/2023 with B12 1146, folate greater than 20  Patient with significant gross hematuria with left ureteral stent migration and eventual loss.  Anemia worsened during this timeframe requiring transfusion support on 4/20/2023 with hemoglobin 6.1 and 5/3/2023 with hemoglobin 6.3.  Patient today returns with improvement in hemoglobin up to 7.5.  Her hematuria has resolved completely.   She continues to receive monthly Mircera under the direction of nephrology and is due for her next treatment on 5/16/2023.  We will continue to monitor counts weekly and transfuse for hemoglobin less than 7.0.    *ESRD on HD:  Patient with previous CKD3/4  Hospitalization 4/29-5/4/2021 with RYAN/CKD, UTI, anemia.  Required initiation of hemodialysis Tuesday Thursday Saturday.   Decrease in frequency of dialysis to twice per week.  She continues to produce a significant amount of urine.   Status post left upper extremity AV fistula placement on 11/3/2021 by vascular surgery  Attempt to hold further dialysis on 1/11/2022 with close monitoring of her laboratory and clinical parameters.  Dialysis quickly resumed due to development of hyperkalemia.  Patient was undergoing dialysis 2 days/week on Mondays and Fridays.    On 8/22/2022 patient transitioned to use of home dialysis device 5 days/week x 2 hours.  Patient continues on home hemodialysis 5 days/week.  With recent MRI she did pursue dialysis immediately after the scan was performed and tolerated this well.    *CHF with severe aortic stenosis:  Echocardiogram 7/12/2019 showing ejection fraction 48% with moderate dilation of the LV cavity, global hypokinesis, grade 2 diastolic dysfunction, mild to moderate aortic stenosis, trivial pericardial effusion.  Echocardiogram 7/19/2021 with ejection fraction 56%, left atrial volume moderately increased, grade 2 diastolic dysfunction, severe calcification aortic valve, moderate aortic stenosis, severe mitral calcification, mild mitral stenosis, marked elevation in RVSP from tricuspid regurgitation.  Echocardiogram on 7/13/2022 with ejection fraction 56-60%, grade 2 diastolic dysfunction, severe aortic stenosis.    Cardiac catheterization 8/23/2022 showed normal coronary arteries, mild to moderate pulmonary hypertension.    She was evaluated by cardiothoracic surgery Dr. Pagan and there was discussion regarding potential  candidacy for TAVR although there was concern given her underlying comorbidities including her metastatic breast cancer.  I discussed patient's prognosis with Dr. Pagan.  She does have opportunities for additional treatment of her malignancy with intravenous chemotherapy and is willing to pursue this in the future when needed.  It is unclear as to her expected survival however given her multiple severe comorbidities with metastatic breast cancer, ESRD on dialysis, severe aortic stenosis, severe anemia.  There is consideration of possible pursuit of balloon valvuloplasty initially to assess her symptomatic response and then consider pursuit of TAVR in the future.   Patient did undergo aortic valvuloplasty during admission 1/10 - 1/11/2023.  Patient was seen earlier today by cardiology, has been stable.    *Left upper and bilateral lower extremity peripheral neuropathy:  Patient reports that left upper extremity neuropathy was not present from previous chemotherapy but occurred after hospitalization that required intubation and critical care stay.  Apparently felt to represent critical care neuropathy.  Improved over time.  Bilateral lower extremity neuropathy felt to be related to diabetes  May have future implications regarding treatment for breast cancer.  Patient reports that peripheral neuropathy symptoms continue in the bilateral lower extremities, unchanged.  This will be a consideration with potential future use of Taxol    *Uterine/endometrial activity on PET scan:  Patient experienced postmenopausal vaginal bleeding in 2013, negative endometrial biopsy and gynecologic evaluation.  With findings on PET scan with enlarging uterus and some hypermetabolic activity, referred back to Dr. Oliveros in gynecology.    9/19/2019 D&C with hysteroscopy by Dr. Oliveros, no significant intraoperative findings, pathologic results with benign findings, no evidence of malignancy.  Patient with question of vaginal bleeding however  subsequently became evident that this was hematuria.  She had been referred to gynecology, did undergo on 5/5/2023 D&C at the same time as cystoscopy, biopsy with no evidence of malignancy but no endometrium present.    *Nausea:  Mild, relieved with Zofran.   Patient experienced improvement in nausea after initiating hemodialysis  No recent nausea    *Myelosuppression secondary to CDK 4/6 inhibitor:  Patient was able to maintain adequate WBC after dosing alteration on Ibrance to treatment at 100 mg daily for 7 days on followed by 7 days off.  Subsequent transition from Ibrance to abemaciclib  Overall counts improved on abemaciclib  Patient with decline in counts felt to be secondary to abemaciclib.  On 4/13/2023 abemaciclib held with WBC 1.82/ANC 0.95, platelet count 93,000. On 4/27/2023 resumed abemaciclib 150 mg in the morning and subsequently added 100 mg dose in the evening on 5/6/2023.  Today, labs with WBC 4.19, ANC 2.7, platelet count 144,000.  Continue for now weekly monitoring of counts on reduced dose of abemaciclib    *Depression  Patient with history of depression, worsened after the death of her son in November 2021  With evidence of progressive malignancy in November 2021, patient agreeable to referral to supportive oncology  Patient currently receiving gabapentin 300 mg nightly, Zoloft 150 mg daily  Patient does continue with difficulties regarding depression that wax and wane.  Currently symptoms under fair control.  Patient was previously seeing supportive oncology.  She does not feel that she needs further follow-up at this time however would not rule out returning to see supportive oncology in the future if needed.  We will prescribe her Zoloft at this point.  She remains off of armodafinil.    *Left perinephric stranding secondary to malignancy with hydronephrosis:  CT 4/22/2021 showed interval enlargement of 2 staghorn calculi in the left kidney with persistent left perinephric  stranding  Hospitalization 4/29-5/4/2021 with RYAN/CKD, UTI, anemia.  Required 2 unit PRBC transfusion and initiated hemodialysis Tuesday Thursday Saturday.    Discussion from urology regarding potential need for surgical procedure to address staghorn calculus left kidney  CT scan 7/2/2021 with only 1 remaining left renal stone identified which had decreased in size.  Patient appears to have passed the other stone.  As above, CT 10/26/2021 with more prominent left hydronephrosis and increase in left perinephric and periureteral stranding.  Felt to possibly be related to passage of recent stone versus partial obstruction secondary to debris or thrombus in the left ureter versus pyelonephritis.  Patient however with no clinical evidence of recent stone passage nor pyelonephritis. Malignancy felt to be the cause of CT changes around the left kidney at this point.   Left ureteral stent replacement 12/16/2021  PET scan 1/11/2022 with decrease in fat injection near left renal pelvis, stent in satisfactory position.  Mild residual hydronephrosis on the left.  Ureteral stent replaced on 3/10/2022  PET scan 4/19/2022 with no hydronephrosis, left ureteral stent in place  PET scan 7/21/2022 with persistent left hydronephrosis, ureteral stent in place  PET scan 10/6/2022 with left nephroureteral stent in place, overall stable findings  PET scan 12/29/2022 with left nephroureteral stent remaining in place, stable findings  PET scan 3/30/2023 with left ureteral stent in place.  Patient developed gross hematuria with migration and spontaneous loss of left ureteral stent.  Patient underwent stent replacement on 5/5/2023.  Cauterization of bleeding focus in the bladder.  Resolution of hematuria.    *Right thyroid nodules  Patient underwent prior thyroid biopsy by her report with benign findings many years ago, records not available  On MRI cervical spine 11/18/2021, finding of 1.8 cm right thyroid nodule  Thyroid ultrasound  11/26/2021 with right-sided thyroid nodules, 1 nodule was hypoechoic, solid measuring 2 cm with biopsy recommended.  Thyroid ultrasound results reviewed with patient.  In light of her metastatic breast cancer, renal failure the significance of the thyroid nodule is felt to be much less.  We discussed possibility of thyroid biopsy however patient is inclined to forego this, especially since she has had a biopsy performed in the past with benign findings by her report.    No hypermetabolic activity identified on PET scan 1/11/2022 in the neck    *Hospitalization 1/28-1/30/2022 due to COVID-19 infection and C. difficile colitis  Patient fully vaccinated with 3 doses Moderna COVID-19 vaccine  Patient developed symptoms 1/26/2022 with abrupt onset sinus congestion, mild cough, subsequently developed nausea and diarrhea on 1/27/2022.  Significant fatigue.  Patient tested positive in emergency department on 1/28/2022 and was admitted  Patient maintained O2 saturation in mid 90% range on room air  Patient received remdesivir  Patient was also found to be positive for C. difficile colitis, received course of oral vancomycin.  Symptoms from both COVID-19 and C. difficile colitis ultimately resolved.    *Hypocalcemia  Patient developed significant hypocalcemia after receiving Xgeva on 5/19/2022  Calcium management per nephrology  No further Xgeva planned due to persistent significant hypocalcemia  Calcium today 9.3    Plan:  Patient will proceed with Faslodex 500 mg IM injection today and continue every 28 days (today is cycle 19)  Continue abemaciclib 150 mg a.m., 100 mg p.m.  Patient is receiving Mircera under the direction of nephrology, dosing every 4 weeks (due next on 5/16/2023) as well as IV iron intermittently as needed (none recently).  Continue to monitor hemoglobin closely regarding ongoing transfusion needs for hemoglobin less than 7.0.   Patient is scheduled to see radiation oncology on 5/12/2023 to consider  palliative radiation to PET avid left anterior acetabulum lesion.  In 1, 2, 3 weeks CBC and RN review  MD visit in 4 weeks with CBC, CMP and patient will be due for cycle 20 Faslodex.  We will discuss recommendations for further follow-up imaging pending timeframe for completion of radiation therapy.    Patient continues on high risk medication requiring intensive monitoring.    I did spend 45 minutes in total time caring for patient today, time spent in review of records, face-to-face consultation, coordination of care, placement of orders, completion of documentation.

## 2023-05-11 NOTE — PROGRESS NOTES
Juana Hall  1960  Date of Office Visit: 05/11/23  Encounter Provider: Luis Rivers MD  Place of Service: [facility] CARDIOLOGY      CHIEF COMPLAINT:  Bicuspid aortic valve  Aortic valve stenosis  Breast cancer: Metastatic  Obesity  End-stage renal disease on hemodialysis  Chronic heart failure with preserved ejection fraction  Chronic normocytic anemia      HISTORY OF PRESENT ILLNESS:   62-year-old female with a medical history of metastatic breast cancer, end-stage renal disease on hemodialysis, normocytic anemia, obesity, immobility, chronic normocytic anemia, and bicuspid aortic valve with severe stenosis who presents to me for evaluation of the severe aortic valve stenosis. She did undergo a transthoracic echocardiogram in July 2022 with severe degenerative aortic valve stenosis and a mean gradient of 45 mmHg across her bicuspid aortic valve.   She subsequently was taken for balloon aortic valvuloplasty.  Her mean gradient initially was 46 mmHg and after a 20 and 22 mm Tyshak balloon we decreased that to 30 mmHg.  She did well with that and had no complications.  She has noticed improvement in her shortness of air since that time.  She also states that she has more energy as of late.      She has been stable from a cardiac standpoint.  She denies any increase in dyspnea on exertion.  She has no chest pain or syncope.  She continues on daily home dialysis and continues to follow with the oncology team.  She has had intermittent issues with anemia and thrombocytopenia but her platelet count looks to have improved.    Review of Systems   Constitutional: Negative for fever and malaise/fatigue.   HENT: Negative for nosebleeds and sore throat.    Eyes: Negative for blurred vision and double vision.   Cardiovascular: Negative for chest pain, claudication, palpitations and syncope.   Respiratory: Negative for cough, shortness of breath and snoring.    Endocrine:  Negative for cold intolerance, heat intolerance and polydipsia.   Skin: Negative for itching, poor wound healing and rash.   Musculoskeletal: Negative for joint pain, joint swelling, muscle weakness and myalgias.   Gastrointestinal: Negative for abdominal pain, melena, nausea and vomiting.   Neurological: Negative for light-headedness, loss of balance, seizures, vertigo and weakness.   Psychiatric/Behavioral: Negative for altered mental status and depression.          Past Medical History:   Diagnosis Date   • Anemia    • Anxiety and depression    • Bicuspid aortic valve     had surgery   • Breast cancer 2004    RIGHT, HAD NEELA. 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   • History of sepsis 2010    KIDNEY STONE   • History of transfusion     no reaction   • Hyperlipidemia    • Hyperthyroidism    • Hypothyroidism    • Kidney stone     CURRENT LEFT-DEC 2021   • Lymphedema of leg     LEFT   • Metastasis from breast cancer 2019    STOMACH-OMENTUM   • Neuromuscular disorder    • Obesity    • ANDREW on CPAP     CPAP   • Osteoarthrosis    • Peripheral neuropathy     FEET BILAT   • Radiculopathy    • Rectal bleeding 08/16/2022   • Thickened endometrium    • Urinary incontinence     wears pads   • Weakness     BILAT LEGS-WITH ANY AMT OF TIME       The following portions of the patient's history were reviewed and updated as appropriate: Social history , Family history and Surgical history      Current Outpatient Medications on File Prior to Visit   Medication Sig Dispense Refill   • Abemaciclib (Verzenio) 100 MG tablet Take 1 tablet by mouth Every Evening. 28 tablet 5   • Abemaciclib (Verzenio) 150 MG tablet Take 1 tablet by mouth Every Morning. 28 tablet 5   • acetaminophen (TYLENOL) 500 MG tablet Take 2 tablets by mouth As Needed for Mild Pain.     • atorvastatin (LIPITOR) 40 MG tablet Take 1 tablet by mouth Every Night. 90 tablet 1   • cephalexin (Keflex) 500 MG capsule Take 1 capsule by mouth 2 (Two) Times a Day. 14 capsule 0   • folic acid (FOLVITE) 1 MG tablet TAKE ONE TABLET BY MOUTH DAILY (Patient taking differently: Take 1 tablet by mouth Daily.) 30 tablet 2   • Fulvestrant (FASLODEX) 250 MG/5ML chemo syringe Inject  into the appropriate muscle as directed by prescriber Every 30 (Thirty) Days. In MD office     • gabapentin (Neurontin) 300 MG capsule Take 1 capsule by mouth Every Night. 60 capsule 2   • HYDROcodone-acetaminophen (NORCO) 5-325 MG per tablet Take 1-2 tablets by mouth Every 4 (Four) Hours As Needed for Moderate Pain (Pain). 12 tablet 0   • insulin detemir (Levemir FlexPen) 100 UNIT/ML injection Inject 50 Units under the skin into the appropriate area as directed Daily. 11 mL 3   • levothyroxine (SYNTHROID, LEVOTHROID) 50 MCG tablet Take 1 tablet by mouth Daily. (Patient taking differently: Take 1 tablet by mouth Every Morning.) 90 tablet 1   • mupirocin (BACTROBAN) 2 % ointment Apply 1 application topically to the appropriate area as directed Daily. Applied to fistula insertion sites for dialysis     • ondansetron (Zofran) 8 MG tablet Take 1 tablet by mouth Every 8 (Eight) Hours As Needed for Nausea or Vomiting. 30 tablet 2   • sertraline (ZOLOFT) 100 MG tablet Take 1.5 tablets by mouth Daily. (Patient taking differently: Take 1.5 tablets by mouth Every Night.) 135 tablet 0   • vitamin B-12 (CYANOCOBALAMIN) 1000 MCG tablet Take 1 tablet by mouth Daily.       No current  "facility-administered medications on file prior to visit.       No Known Allergies    Vitals:    05/11/23 1027   BP: 118/62   Pulse: 71   Weight: (!) 166 kg (365 lb 15.4 oz)   Height: 170.2 cm (67\")     Body mass index is 57.32 kg/m².   Constitutional:       Appearance: Well-developed.      Comments: Obese.  In wheelchair.   Eyes:      General: No scleral icterus.     Conjunctiva/sclera: Conjunctivae normal.   HENT:      Head: Normocephalic and atraumatic.   Neck:      Thyroid: No thyromegaly.      Vascular: Normal carotid pulses. No carotid bruit, hepatojugular reflux or JVD.      Trachea: No tracheal deviation.   Pulmonary:      Effort: No respiratory distress.      Breath sounds: Normal breath sounds. No decreased breath sounds. No wheezing. No rhonchi. No rales.   Chest:      Chest wall: Not tender to palpatation.   Cardiovascular:      Normal rate. Regular rhythm.      No gallop.   Pulses:     Carotid: 2+ bilaterally.     Radial: 2+ bilaterally.     Femoral: 2+ bilaterally.     Dorsalis pedis: 2+ bilaterally.     Posterior tibial: 2+ bilaterally.  Edema:     Peripheral edema absent.   Abdominal:      General: Bowel sounds are normal. There is no distension.      Palpations: Abdomen is soft.      Tenderness: There is no abdominal tenderness.   Musculoskeletal:         General: No deformity.      Cervical back: Normal range of motion and neck supple. Skin:     Findings: No erythema or rash.   Neurological:      Mental Status: Alert and oriented to person, place, and time.      Sensory: No sensory deficit.   Psychiatric:         Behavior: Behavior normal.            Lab Results   Component Value Date    WBC 3.01 (L) 05/05/2023    HGB 7.0 (L) 05/05/2023    HCT 21.4 (L) 05/05/2023    MCV 99.5 (H) 05/05/2023     05/05/2023       Lab Results   Component Value Date    GLUCOSE 203 (H) 05/05/2023    BUN 31 (H) 05/05/2023    CREATININE 5.30 (H) 05/05/2023    EGFRIFNONA 11 (L) 02/16/2022    EGFRIFAFRI  01/29/2022    "   Comment:      <15 Indicative of kidney failure.    BCR 5.8 (L) 05/05/2023    K 4.7 05/05/2023    CO2 28.3 05/05/2023    CALCIUM 8.8 05/05/2023    PROTENTOTREF 7.0 03/16/2019    ALBUMIN 2.9 (L) 05/03/2023    LABIL2 1.2 03/16/2019    AST 16 05/03/2023    ALT 12 05/03/2023       Lab Results   Component Value Date    GLUCOSE 203 (H) 05/05/2023    CALCIUM 8.8 05/05/2023     05/05/2023    K 4.7 05/05/2023    CO2 28.3 05/05/2023     05/05/2023    BUN 31 (H) 05/05/2023    CREATININE 5.30 (H) 05/05/2023    EGFRIFAFRI  01/29/2022      Comment:      <15 Indicative of kidney failure.    EGFRIFNONA 11 (L) 02/16/2022    BCR 5.8 (L) 05/05/2023    ANIONGAP 8.7 05/05/2023       Lab Results   Component Value Date    CHOL 81 01/11/2023    CHLPL 122 02/16/2022    TRIG 99 01/11/2023    HDL 26 (L) 01/11/2023    LDL 36 01/11/2023         ECG 12 Lead    Date/Time: 5/11/2023 10:42 AM  Performed by: Luis Rivers MD  Authorized by: Luis Rivers MD   Comparison: compared with previous ECG from 2/13/2023  Similar to previous ECG  Rhythm: sinus rhythm             Results for orders placed during the hospital encounter of 01/10/23    Adult Transthoracic Echo Limited W/ Cont if Necessary Per Protocol    Interpretation Summary  •  Moderate to severe aortic valve stenosis is present.      DISCUSSION/SUMMARY  62-year-old female with a medical history of morbid obesity, end-stage renal disease on hemodialysis, immobility, metastatic breast cancer, who presents to me for evaluation of severe bicuspid aortic valve stenosis.  She does have dyspnea on exertion, however it is tough to differentiate the cause of her dyspnea from her immobility and deconditioning along with obesity or her aortic valve stenosis.  She did subsequently undergo balloon aortic valvuloplasty with improvement in her gradients across the aortic valve and improvement in her dyspnea and fatigue.    She continues to follow with the hematology/oncology  team for her metastatic breast cancer.  She is also on home dialysis.  She seems to be tolerating the home dialysis pretty well.  She has no new symptoms at this time.    1.  Bicuspid aortic valve stenosis: Severe  - Status post balloon aortic valvuloplasty.  Mean gradient improved to the moderate range.  No significant regurgitation documented  - Plan on repeat transthoracic echocardiogram in 6 months to reassess gradients and symptoms.  She is doing well.  Hold off on valve replacement in this scenario.    2.  End-stage renal disease on hemodialysis: Continue to defer to nephrology     3.  Chronic heart failure with preserved ejection fraction: The report association class II symptoms.  Volume management with hemodialysis

## 2023-05-12 ENCOUNTER — OFFICE VISIT (OUTPATIENT)
Dept: RADIATION ONCOLOGY | Facility: HOSPITAL | Age: 63
End: 2023-05-12
Payer: COMMERCIAL

## 2023-05-12 ENCOUNTER — HOSPITAL ENCOUNTER (OUTPATIENT)
Dept: RADIATION ONCOLOGY | Facility: HOSPITAL | Age: 63
Setting detail: RADIATION/ONCOLOGY SERIES
End: 2023-05-12
Payer: COMMERCIAL

## 2023-05-12 VITALS — HEART RATE: 79 BPM | SYSTOLIC BLOOD PRESSURE: 117 MMHG | DIASTOLIC BLOOD PRESSURE: 63 MMHG | OXYGEN SATURATION: 98 %

## 2023-05-12 DIAGNOSIS — C50.919 PRIMARY MALIGNANT NEOPLASM OF BREAST WITH METASTASIS: Primary | ICD-10-CM

## 2023-05-12 PROCEDURE — G0463 HOSPITAL OUTPT CLINIC VISIT: HCPCS | Performed by: RADIOLOGY

## 2023-05-12 NOTE — PROGRESS NOTES
Subjective     Leodan Irvin Jr., MD    CC: stage IV breast cancer with progressive disease in left acetabulum                                Dear Leodan Irvin Jr., MD     I had the pleasure of seeing Juana Hall  today in the [facility]    The patient is a 61 y.o. female with metastatic breast cancer.  She was first diagnosed with stage III lobular breast cancer in 2003 treated with neoadjuvant chemo, bilateral mastectomy.  She attempted radiation to the right chest wall and regional nodes but had to stop early due to toxicity.       She developed metastatic carcinomatosis in July 2019.  Foundation liquid biopsy showed mutation in BETH, NF1 and CHEK2.   [facility] germline testing showed VUS in BETH and POLE.   PET scan July 2019 showed increased uptake in omentum, retroperitoneal nodes, thickening of endometrium but no bony metastatic disease. She started on ibrance and aromasin in August 2019.     She had a PET scan on 11/10/21 which showed multiple bilateral hypermetabolic pulmonary nodules, ill defined hypermetabolic thickening of inferior omentum, new uptake in C7 suv of 5.2 and focal uptake in distal left clavicle in area adjacent to degenerative changed favored to be reactive.  She had a mri of the cervical spine on 11/18/21 which showed right C7 abnormality, likely representing metastatic disease. PET scan on 1/11/22 showed no change in soft tissue superior to dome of bladder with hypermetabolic activity likely carcinomatosis, decrease in size and activity of bilateral pulmonary nodules, increase uptake in C7 slightly from 5.1 to 7.1ng/ml and no evidence of bony disease elsewhere.       She is on dialysis every M,W,F for ESRD.      She had a pet scan on 4/21/22 which showed multiple fdg avid lesions including a mixed lytic and blastic lesion in posterior spinous process of L2 with max suv of 5.1 increased from 3.7, mixed lesion in C7 with max suv of 7.8 same as previous suv, left clavicle suv of  8 increased from 4.6 on 1/11/22, mutiple hypermetabolic pelvic nodes, and decreased uptake in bandlike area of omental thickening.         She had a bone scan on 5/3/22 which showed no evidence of osseous metastases.  She had a mri of the lumbar spine on 5/3/22 which showed abnormal enhancement involving the spinous process of L2 and S2 vertebral body consistent with metastatic lesions. The lesion at S2 was also felt to possibly represent a small hemangioma.     Interval hx 5/12/23:  She completed palliative radiation to the lumbar spine on 6/7/22 30Gy in 10 fractions.  She has been on Faslodex 500 mg IM every 4 weeks and continuing on abemaciclib 150 mg a.m., 100 mg p.m.     She had a PET scan on 3/30/23 which showed bone metastases are all slightly more hypermetabolic than previously including distal left clavicle, right anterior 6th rib, right posterior acetabulum max suv of 5.8, new lesion medial left anterior acetabulum suv of 10.7.   and there is a new fairly intense hypermetabolic metastasis at the medial aspect of the       she had a pelvis mri on 3/30/23 which showed multiple osseous metastases throughout the pelvic bones,  including along the anterior superior left acetabulum where abnormal radiotracer activity was described on the prior PET/CT. The chronicity of these lesions is difficult to assess given the absence of prior pelvis MRI for correlation and the difficulty proceeding these abnormalities on the PET scan and associated CT exams. A few of the lesions are associated with lucency and a thin rim of peripheral sclerosis evident only in retrospect and quite subtle on the most recent  prior PET/CT and not evident on the more remote prior PET/CT from 04/2022, suggesting progressive disease.     She is referred today for evaluation for palliative radiation to the new left anterior acetabular lesion.         Review of Systems   Constitutional: Positive for activity change, fatigue and unexpected weight  change.   Respiratory: Negative.    Musculoskeletal: Positive for arthralgias.         Past Medical History:   Diagnosis Date   • Anemia    • Anxiety and depression    • Bicuspid aortic valve     had surgery   • Breast cancer 2004    RIGHT, HAD NEELA. MASTECTOMY, CHEMO AND RADIATION   • CHF (congestive heart failure)    • CKD (chronic kidney disease)     dialysis   • COVID 01/2023   • Diabetes mellitus    • Dialysis patient     pt does at home   • Elevated cholesterol    • Frequent UTI     last 6 months   • Heart murmur    • History of cancer chemotherapy 2005   • History of hypertension 2023    due to low b/p was taken off meds   • History of kidney stones    • History of MRSA infection 2005    POST BREAST SURGERY-TREATED [facility]   • History of radiation therapy     2 times [id number] for breast cancer   and 2019 for omentum cancer   • History of sepsis 2010    KIDNEY STONE   • History of transfusion     no reaction   • Hyperlipidemia    • Hyperthyroidism    • Hypothyroidism    • Kidney stone     CURRENT LEFT-DEC 2021   • Lymphedema of leg     LEFT   • Metastasis from breast cancer 2019    STOMACH-OMENTUM   • Neuromuscular disorder    • Obesity    • ANDREW on CPAP     CPAP   • Osteoarthrosis    • Peripheral neuropathy     FEET BILAT   • Radiculopathy    • Rectal bleeding 08/16/2022   • Thickened endometrium    • Urinary incontinence     wears pads   • Weakness     BILAT LEGS-WITH ANY AMT OF TIME         Past Surgical History:   Procedure Laterality Date   • AORTIC VALVULOPLASTY  01/2023   • ARTERIOVENOUS FISTULA/SHUNT SURGERY Left 11/03/2021    Procedure: LEFT  BRACHIOCEPALIC ARTERIOVENOUS FISTULA;  Surgeon: Dejuan Adler MD;  Location: [facility];  Service: Vascular;  Laterality: Left;   • BREAST RECONSTRUCTION  2004    WITH IMPLANTS, AND REVISION, NOW REMOVED   • BREAST SURGERY Bilateral 2004    breast implants removed and then replaced due to infection   • CARDIAC CATHETERIZATION N/A 08/23/2022    Procedure:  CORONARY ANGIOGRAPHY;  Surgeon: Luis Rivers MD;  Location: [facility] CATH INVASIVE LOCATION;  Service: Cardiology;  Laterality: N/A;   • CARDIAC CATHETERIZATION N/A 2022    Procedure: Right Heart Cath;  Surgeon: Luis Rivers MD;  Location: [facility] CATH INVASIVE LOCATION;  Service: Cardiology;  Laterality: N/A;   • CARDIAC CATHETERIZATION N/A 01/10/2023    Procedure: Valvuloplasty;  Surgeon: Luis Rivers MD;  Location: [facility] CATH INVASIVE LOCATION;  Service: Cardiovascular;  Laterality: N/A;  01/10/2023   • CARDIAC CATHETERIZATION N/A 01/10/2023    Procedure: Aortic root aortogram;  Surgeon: Luis Rivers MD;  Location: [facility] CATH INVASIVE LOCATION;  Service: Cardiovascular;  Laterality: N/A;   • CATARACT EXTRACTION WITH INTRAOCULAR LENS IMPLANT Bilateral    •  SECTION  1987   •  SECTION  1987   • CYSTOSCOPY W/ URETERAL STENT PLACEMENT     • CYSTOSCOPY W/ URETERAL STENT PLACEMENT Left 2021    Procedure: CYSTOSCOPY KEFT RETROGRADE PYELOGRAM  LEFT  URETERAL STENT PLACEMENT;  Surgeon: Leodan Mckee Jr., MD;  Location: [facility];  Service: Urology;  Laterality: Left;   • CYSTOSCOPY W/ URETERAL STENT PLACEMENT Left 03/10/2022    Procedure: CYSTOSCOPY AND LEFT URETERAL STENT EXCHANGE, RETROGRADE PYELOGRAM;  Surgeon: Leodan Mckee Jr., MD;  Location: [facility];  Service: Urology;  Laterality: Left;   • CYSTOSCOPY W/ URETERAL STENT PLACEMENT Left 2023    Procedure: CYSTOSCOPY WITH LEFT URETERAL STENT PLACEMENT, RETROGRADE PYELOGRAM, CLOT EVACUATION, BLADDER FULGARATION;  Surgeon: Leodan Mckee Jr., MD;  Location: [facility];  Service: Urology;  Laterality: Left;   • D & C HYSTEROSCOPY N/A 2017    Procedure: DILATATION AND CURETTAGE, HYSTEROSCOPY ;  Surgeon: Cherie Oliveros MD;  Location: [facility];  Service:    • D & C HYSTEROSCOPY N/A 2019    Procedure: DILATATION AND CURETTAGE  HYSTEROSCOPY/POLYPECTOMY;  Surgeon: Cherie Oliveros MD;  Location: [facility] OR [facility];  Service: Gynecology   • D & C HYSTEROSCOPY ENDOMETRIAL ABLATION N/A 2023    Procedure: DILATATION AND CURETTAGE;  Surgeon: Ina Marcos MD;  Location: [facility] MAIN OR;  Service: Obstetrics/Gynecology;  Laterality: N/A;   • ENDOSCOPY     • INSERTION AND REMOVAL HEMODIALYSIS CATHETER Right 2021   • INSERTION HEMODIALYSIS CATHETER Right 2021    Procedure: HEMODIALYSIS CATHETER INSERTION;  Surgeon: Clint Hernández MD;  Location: [facility] MAIN OR;  Service: Vascular;  Laterality: Right;   • LYMPH NODE BIOPSY     • MASTECTOMY Bilateral    • VENOUS ACCESS DEVICE (PORT) INSERTION AND REMOVAL           Social History     Socioeconomic History   • Marital status:      Spouse name: Rk   Tobacco Use   • Smoking status: Never   • Smokeless tobacco: Never   Vaping Use   • Vaping Use: Never used   Substance and Sexual Activity   • Alcohol use: No   • Drug use: Never   • Sexual activity: Yes     Partners: Male     Birth control/protection: Post-menopausal         Family History   Problem Relation Age of Onset   • Heart attack Mother    • Coronary artery disease Mother         Mother  from MI at 72   • Diabetes Mother    • Breast cancer Mother    • Hyperlipidemia Mother    • Arthritis Mother    • Cancer Mother    • Heart attack Father    • Aortic aneurysm Father         Father with ruptured thoracic aortic aneurysm   • Coronary artery disease Father         Father  from MI at 74   • Heart disease Father    • Hyperlipidemia Father    • Arthritis Father    • Heart attack Maternal Grandmother    • Coronary artery disease Maternal Grandmother         MGM deceeased from MI at age 76   • Malig Hyperthermia Neg Hx           Objective    Physical Exam  Constitutional:       Appearance: She is obese.   HENT:      Head: Atraumatic.   Pulmonary:      Effort: Pulmonary effort is normal.   Neurological:       Mental Status: She is oriented to person, place, and time.   Psychiatric:         Mood and Affect: Mood normal.           Current Outpatient Medications on File Prior to Visit   Medication Sig Dispense Refill   • Abemaciclib (Verzenio) 100 MG tablet Take 1 tablet by mouth Every Evening. 28 tablet 5   • Abemaciclib (Verzenio) 150 MG tablet Take 1 tablet by mouth Every Morning. 28 tablet 5   • acetaminophen (TYLENOL) 500 MG tablet Take 2 tablets by mouth As Needed for Mild Pain.     • atorvastatin (LIPITOR) 40 MG tablet Take 1 tablet by mouth Every Night. 90 tablet 1   • cephalexin (Keflex) 500 MG capsule Take 1 capsule by mouth 2 (Two) Times a Day. 14 capsule 0   • folic acid (FOLVITE) 1 MG tablet TAKE ONE TABLET BY MOUTH DAILY (Patient taking differently: Take 1 tablet by mouth Daily.) 30 tablet 2   • Fulvestrant (FASLODEX) 250 MG/5ML chemo syringe Inject  into the appropriate muscle as directed by prescriber Every 30 (Thirty) Days. In MD office     • gabapentin (Neurontin) 300 MG capsule Take 1 capsule by mouth Every Night. 60 capsule 2   • HYDROcodone-acetaminophen (NORCO) 5-325 MG per tablet Take 1-2 tablets by mouth Every 4 (Four) Hours As Needed for Moderate Pain (Pain). 12 tablet 0   • insulin detemir (Levemir FlexPen) 100 UNIT/ML injection Inject 50 Units under the skin into the appropriate area as directed Daily. 11 mL 3   • levothyroxine (SYNTHROID, LEVOTHROID) 50 MCG tablet Take 1 tablet by mouth Daily. (Patient taking differently: Take 1 tablet by mouth Every Morning.) 90 tablet 1   • mupirocin (BACTROBAN) 2 % ointment Apply 1 application topically to the appropriate area as directed Daily. Applied to fistula insertion sites for dialysis     • ondansetron (Zofran) 8 MG tablet Take 1 tablet by mouth Every 8 (Eight) Hours As Needed for Nausea or Vomiting. 30 tablet 2   • sertraline (ZOLOFT) 100 MG tablet Take 1.5 tablets by mouth Daily. (Patient taking differently: Take 1.5 tablets by mouth Every Night.)  135 tablet 0   • vitamin B-12 (CYANOCOBALAMIN) 1000 MCG tablet Take 1 tablet by mouth Daily.       Current Facility-Administered Medications on File Prior to Visit   Medication Dose Route Frequency Provider Last Rate Last Admin   • [COMPLETED] Fulvestrant (FASLODEX) chemo injection 500 mg  500 mg Intramuscular Once Leodan Irvin Jr., MD   500 mg at 05/11/23 1427       ALLERGIES:  No Known Allergies    LMP  (LMP Unknown)          5/11/2023     1:40 PM   Current Status   ECOG score 1         Assessment & Plan     62 year old female with stage IV breast cancer first diagnosed in 2003 with omental metastases in 2019.  Her disease remains fairly well controlled, however she has evidence of a new metastatic lesion in left anterior acetabulum.  I discussed with her and her  the option for palliative radiation to the progressive lesion in the left acetabulum.  I discussed possible acute side effects of fatigue, fracture.  I discussed possible late effects of weakness, fracture or lymphedema.  She and her  voiced understanding and wish to proceed.  We will proceed with simulation today and begin her treatments next week. I plan to deliver a dose of 20Gy in 5 fractions.     I personally spent greater 45 minutes today assessing, managing, discussing and documenting my visit with the patient. That time includes review of records, imaging and pathology reports, obtaining my own history, performing a medically appropriate evaluation, counseling and educating the patient, discussing goals, logistics, alternatives and risks of my recommendations, surveillance options and potential outcomes. It also includes the time documenting the clinical information in the EMR and communicating my recommendations to the other involved physicians.                Thank you very much for allowing me to participate in the care of this very pleasant patient.    Sincerely,      Lou Sotomayor MD     Subjective

## 2023-05-15 DIAGNOSIS — F32.A ANXIETY AND DEPRESSION: ICD-10-CM

## 2023-05-15 DIAGNOSIS — F41.9 ANXIETY AND DEPRESSION: ICD-10-CM

## 2023-05-15 PROCEDURE — 77300 RADIATION THERAPY DOSE PLAN: CPT | Performed by: RADIOLOGY

## 2023-05-15 PROCEDURE — 77301 RADIOTHERAPY DOSE PLAN IMRT: CPT | Performed by: RADIOLOGY

## 2023-05-15 PROCEDURE — 77338 DESIGN MLC DEVICE FOR IMRT: CPT | Performed by: RADIOLOGY

## 2023-05-16 ENCOUNTER — HOSPITAL ENCOUNTER (OUTPATIENT)
Dept: RADIATION ONCOLOGY | Facility: HOSPITAL | Age: 63
Discharge: HOME | End: 2023-05-16

## 2023-05-16 ENCOUNTER — RADIATION ONCOLOGY WEEKLY ASSESSMENT (OUTPATIENT)
Dept: RADIATION ONCOLOGY | Facility: HOSPITAL | Age: 63
End: 2023-05-16
Payer: COMMERCIAL

## 2023-05-16 DIAGNOSIS — C50.919 PRIMARY MALIGNANT NEOPLASM OF BREAST WITH METASTASIS: Primary | ICD-10-CM

## 2023-05-16 LAB
RAD ONC ARIA COURSE ID: NORMAL
RAD ONC ARIA COURSE INTENT: NORMAL
RAD ONC ARIA COURSE LAST TREATMENT DATE: NORMAL
RAD ONC ARIA COURSE START DATE: NORMAL
RAD ONC ARIA COURSE TREATMENT ELAPSED DAYS: 0
RAD ONC ARIA FIRST TREATMENT DATE: NORMAL
RAD ONC ARIA PLAN FRACTIONS TREATED TO DATE: 1
RAD ONC ARIA PLAN ID: NORMAL
RAD ONC ARIA PLAN PRESCRIBED DOSE PER FRACTION: 4 GY
RAD ONC ARIA PLAN PRIMARY REFERENCE POINT: NORMAL
RAD ONC ARIA PLAN TOTAL FRACTIONS PRESCRIBED: 5
RAD ONC ARIA PLAN TOTAL PRESCRIBED DOSE: 2000 CGY
RAD ONC ARIA REFERENCE POINT DOSAGE GIVEN TO DATE: 4 GY
RAD ONC ARIA REFERENCE POINT ID: NORMAL
RAD ONC ARIA REFERENCE POINT SESSION DOSAGE GIVEN: 4 GY

## 2023-05-16 PROCEDURE — 77427 RADIATION TX MANAGEMENT X5: CPT | Performed by: RADIOLOGY

## 2023-05-16 PROCEDURE — 77386: CPT | Performed by: RADIOLOGY

## 2023-05-16 PROCEDURE — 77386 CHG INTENSITY MODULATED RADIATION TX DLVR COMPLEX: CPT | Performed by: RADIOLOGY

## 2023-05-16 PROCEDURE — 77014 CHG CT GUIDANCE RADIATION THERAPY FLDS PLACEMENT: CPT | Performed by: RADIOLOGY

## 2023-05-16 PROCEDURE — 77263 THER RADIOLOGY TX PLNG CPLX: CPT | Performed by: RADIOLOGY

## 2023-05-16 RX ORDER — SERTRALINE HYDROCHLORIDE 100 MG/1
TABLET, FILM COATED ORAL
Qty: 135 TABLET | Refills: 0 | OUTPATIENT
Start: 2023-05-16

## 2023-05-16 NOTE — PROGRESS NOTES
Physician Weekly Management Note    Diagnosis:     Diagnosis Plan   1. Primary malignant neoplasm of breast with metastasis            RT Details:  fx 1/5 left acetabulum 4/20Gy    Notes on Treatment course, Films, Medical progress:  kps 80% doing ok, denies pain, cont on     Weekly Management:  Medication reviewed?   Yes  New medications given?   No  Problemlist reviewed?   Yes  Problem added?   No  Issues raised requiring referral to support services - task assigned to:  na    Technical aspects reviewed:  Weekly OBI approved?   Yes  Weekly port films approved?   Yes  Change requests noted on port film?   No  Patient setup and plan reviewed?   Yes    Chart Reviewed:  Continue current treatment plan?   Yes  Treatment plan change requested?   No  CBC reviewed?   Yes  Concurrent Chemo?   No    Objective     Toxicities:   none     Review of Systems   Constitutional: Negative.    Musculoskeletal: Positive for arthralgias.          There were no vitals filed for this visit.        5/11/2023     1:40 PM   Current Status   ECOG score 1       Physical Exam  Constitutional:       Appearance: She is obese.   Psychiatric:         Mood and Affect: Mood normal.             Problem Summary List    Diagnosis:     Diagnosis Plan   1. Primary malignant neoplasm of breast with metastasis          Pathology:   Metastatic breast cancer     Past Medical History:   Diagnosis Date   • Anemia    • Anxiety and depression    • Bicuspid aortic valve     had surgery   • Breast cancer 2004    RIGHT, HAD NEELA. MASTECTOMY, CHEMO AND RADIATION   • CHF (congestive heart failure)    • CKD (chronic kidney disease)     dialysis   • COVID 01/2023   • Diabetes mellitus    • Dialysis patient     pt does at home   • Elevated cholesterol    • Frequent UTI     last 6 months   • Heart murmur    • History of cancer chemotherapy 2005   • History of hypertension 2023    due to low b/p was taken off meds   • History of kidney stones    • History of MRSA infection  2005    POST BREAST SURGERY-TREATED [facility]   • History of radiation therapy     2 times [id number] for breast cancer   and 2019 for omentum cancer   • History of sepsis 2010    KIDNEY STONE   • History of transfusion     no reaction   • Hyperlipidemia    • Hyperthyroidism    • Hypothyroidism    • Kidney stone     CURRENT LEFT-DEC 2021   • Lymphedema of leg     LEFT   • Metastasis from breast cancer 2019    STOMACH-OMENTUM   • Neuromuscular disorder    • Obesity    • ANDREW on CPAP     CPAP   • Osteoarthrosis    • Peripheral neuropathy     FEET BILAT   • Radiculopathy    • Rectal bleeding 08/16/2022   • Thickened endometrium    • Urinary incontinence     wears pads   • Weakness     BILAT LEGS-WITH ANY AMT OF TIME         Past Surgical History:   Procedure Laterality Date   • AORTIC VALVULOPLASTY  01/2023   • ARTERIOVENOUS FISTULA/SHUNT SURGERY Left 11/03/2021    Procedure: LEFT  BRACHIOCEPALIC ARTERIOVENOUS FISTULA;  Surgeon: Dejuan Adler MD;  Location: [facility] OR;  Service: Vascular;  Laterality: Left;   • BREAST RECONSTRUCTION  2004    WITH IMPLANTS, AND REVISION, NOW REMOVED   • BREAST SURGERY Bilateral 2004    breast implants removed and then replaced due to infection   • CARDIAC CATHETERIZATION N/A 08/23/2022    Procedure: CORONARY ANGIOGRAPHY;  Surgeon: Luis Rivers MD;  Location: [facility] CATH INVASIVE LOCATION;  Service: Cardiology;  Laterality: N/A;   • CARDIAC CATHETERIZATION N/A 08/23/2022    Procedure: Right Heart Cath;  Surgeon: Luis Rivers MD;  Location: [facility] CATH INVASIVE LOCATION;  Service: Cardiology;  Laterality: N/A;   • CARDIAC CATHETERIZATION N/A 01/10/2023    Procedure: Valvuloplasty;  Surgeon: Luis Rivers MD;  Location: [facility] CATH INVASIVE LOCATION;  Service: Cardiovascular;  Laterality: N/A;  01/10/2023   • CARDIAC CATHETERIZATION N/A 01/10/2023    Procedure: Aortic root aortogram;  Surgeon: Luis Rivers MD;  Location: [facility] CATH INVASIVE  LOCATION;  Service: Cardiovascular;  Laterality: N/A;   • CATARACT EXTRACTION WITH INTRAOCULAR LENS IMPLANT Bilateral    •  SECTION  1987   •  SECTION  1987   • CYSTOSCOPY W/ URETERAL STENT PLACEMENT     • CYSTOSCOPY W/ URETERAL STENT PLACEMENT Left 2021    Procedure: CYSTOSCOPY KEFT RETROGRADE PYELOGRAM  LEFT  URETERAL STENT PLACEMENT;  Surgeon: Leodan Mckee Jr., MD;  Location: [facility] MAIN OR;  Service: Urology;  Laterality: Left;   • CYSTOSCOPY W/ URETERAL STENT PLACEMENT Left 03/10/2022    Procedure: CYSTOSCOPY AND LEFT URETERAL STENT EXCHANGE, RETROGRADE PYELOGRAM;  Surgeon: Leodan Mckee Jr., MD;  Location: [facility] MAIN OR;  Service: Urology;  Laterality: Left;   • CYSTOSCOPY W/ URETERAL STENT PLACEMENT Left 2023    Procedure: CYSTOSCOPY WITH LEFT URETERAL STENT PLACEMENT, RETROGRADE PYELOGRAM, CLOT EVACUATION, BLADDER FULGARATION;  Surgeon: Leodan Mckee Jr., MD;  Location: [facility] MAIN OR;  Service: Urology;  Laterality: Left;   • D & C HYSTEROSCOPY N/A 2017    Procedure: DILATATION AND CURETTAGE, HYSTEROSCOPY ;  Surgeon: Cherie Oliveros MD;  [facility];  Service:    • D & C HYSTEROSCOPY N/A 2019    Procedure: DILATATION AND CURETTAGE HYSTEROSCOPY/POLYPECTOMY;  Surgeon: Cherie Oliveros MD;  [facility];  Service: Gynecology   • D & C HYSTEROSCOPY ENDOMETRIAL ABLATION N/A 2023    Procedure: DILATATION AND CURETTAGE;  Surgeon: Ina Marcos MD;  Location: [facility] MAIN OR;  Service: Obstetrics/Gynecology;  Laterality: N/A;   • ENDOSCOPY     • INSERTION AND REMOVAL HEMODIALYSIS CATHETER Right 2021   • INSERTION HEMODIALYSIS CATHETER Right 2021    Procedure: HEMODIALYSIS CATHETER INSERTION;  Surgeon: Clint Hernández MD;  Location: [facility] MAIN OR;  Service: Vascular;  Laterality: Right;   • LYMPH NODE BIOPSY     • MASTECTOMY Bilateral    • VENOUS ACCESS DEVICE (PORT) INSERTION AND  REMOVAL           Current Outpatient Medications on File Prior to Visit   Medication Sig Dispense Refill   • Abemaciclib (Verzenio) 100 MG tablet Take 1 tablet by mouth Every Evening. 28 tablet 5   • Abemaciclib (Verzenio) 150 MG tablet Take 1 tablet by mouth Every Morning. 28 tablet 5   • acetaminophen (TYLENOL) 500 MG tablet Take 2 tablets by mouth As Needed for Mild Pain.     • atorvastatin (LIPITOR) 40 MG tablet Take 1 tablet by mouth Every Night. 90 tablet 1   • cephalexin (Keflex) 500 MG capsule Take 1 capsule by mouth 2 (Two) Times a Day. 14 capsule 0   • folic acid (FOLVITE) 1 MG tablet TAKE ONE TABLET BY MOUTH DAILY (Patient taking differently: Take 1 tablet by mouth Daily.) 30 tablet 2   • Fulvestrant (FASLODEX) 250 MG/5ML chemo syringe Inject  into the appropriate muscle as directed by prescriber Every 30 (Thirty) Days. In MD office     • gabapentin (Neurontin) 300 MG capsule Take 1 capsule by mouth Every Night. 60 capsule 2   • HYDROcodone-acetaminophen (NORCO) 5-325 MG per tablet Take 1-2 tablets by mouth Every 4 (Four) Hours As Needed for Moderate Pain (Pain). 12 tablet 0   • insulin detemir (Levemir FlexPen) 100 UNIT/ML injection Inject 50 Units under the skin into the appropriate area as directed Daily. 11 mL 3   • levothyroxine (SYNTHROID, LEVOTHROID) 50 MCG tablet Take 1 tablet by mouth Daily. (Patient taking differently: Take 1 tablet by mouth Every Morning.) 90 tablet 1   • mupirocin (BACTROBAN) 2 % ointment Apply 1 application topically to the appropriate area as directed Daily. Applied to fistula insertion sites for dialysis     • ondansetron (Zofran) 8 MG tablet Take 1 tablet by mouth Every 8 (Eight) Hours As Needed for Nausea or Vomiting. 30 tablet 2   • sertraline (ZOLOFT) 100 MG tablet Take 1.5 tablets by mouth Daily. (Patient taking differently: Take 1.5 tablets by mouth Every Night.) 135 tablet 0   • vitamin B-12 (CYANOCOBALAMIN) 1000 MCG tablet Take 1 tablet by mouth Daily.       No  current facility-administered medications on file prior to visit.       No Known Allergies      Referring Provider:    No referring provider defined for this encounter.    Oncologist:  No primary care provider on file.      Seen and approved by:  Lou Sotomayor MD  05/16/2023  Subjective

## 2023-05-17 ENCOUNTER — HOSPITAL ENCOUNTER (OUTPATIENT)
Dept: RADIATION ONCOLOGY | Facility: HOSPITAL | Age: 63
Discharge: HOME | End: 2023-05-17

## 2023-05-17 LAB
RAD ONC ARIA COURSE ID: NORMAL
RAD ONC ARIA COURSE INTENT: NORMAL
RAD ONC ARIA COURSE LAST TREATMENT DATE: NORMAL
RAD ONC ARIA COURSE START DATE: NORMAL
RAD ONC ARIA COURSE TREATMENT ELAPSED DAYS: 1
RAD ONC ARIA FIRST TREATMENT DATE: NORMAL
RAD ONC ARIA PLAN FRACTIONS TREATED TO DATE: 2
RAD ONC ARIA PLAN ID: NORMAL
RAD ONC ARIA PLAN PRESCRIBED DOSE PER FRACTION: 4 GY
RAD ONC ARIA PLAN PRIMARY REFERENCE POINT: NORMAL
RAD ONC ARIA PLAN TOTAL FRACTIONS PRESCRIBED: 5
RAD ONC ARIA PLAN TOTAL PRESCRIBED DOSE: 2000 CGY
RAD ONC ARIA REFERENCE POINT DOSAGE GIVEN TO DATE: 8 GY
RAD ONC ARIA REFERENCE POINT ID: NORMAL
RAD ONC ARIA REFERENCE POINT SESSION DOSAGE GIVEN: 4 GY

## 2023-05-17 PROCEDURE — 77386: CPT | Performed by: STUDENT IN AN ORGANIZED HEALTH CARE EDUCATION/TRAINING PROGRAM

## 2023-05-17 PROCEDURE — 77386 CHG INTENSITY MODULATED RADIATION TX DLVR COMPLEX: CPT | Performed by: STUDENT IN AN ORGANIZED HEALTH CARE EDUCATION/TRAINING PROGRAM

## 2023-05-17 PROCEDURE — 77014 CHG CT GUIDANCE RADIATION THERAPY FLDS PLACEMENT: CPT | Performed by: RADIOLOGY

## 2023-05-18 ENCOUNTER — CLINICAL SUPPORT (OUTPATIENT)
Dept: ONCOLOGY | Facility: HOSPITAL | Age: 63
End: 2023-05-18
Payer: COMMERCIAL

## 2023-05-18 ENCOUNTER — LAB (OUTPATIENT)
Dept: LAB | Facility: HOSPITAL | Age: 63
End: 2023-05-18
Payer: COMMERCIAL

## 2023-05-18 ENCOUNTER — HOSPITAL ENCOUNTER (OUTPATIENT)
Dept: RADIATION ONCOLOGY | Facility: HOSPITAL | Age: 63
Discharge: HOME | End: 2023-05-18

## 2023-05-18 DIAGNOSIS — C50.919 PRIMARY MALIGNANT NEOPLASM OF BREAST WITH METASTASIS: ICD-10-CM

## 2023-05-18 LAB
BASOPHILS # BLD AUTO: 0.03 10*3/MM3 (ref 0–0.2)
BASOPHILS NFR BLD AUTO: 1 % (ref 0–1.5)
DEPRECATED RDW RBC AUTO: 66.4 FL (ref 37–54)
EOSINOPHIL # BLD AUTO: 0.46 10*3/MM3 (ref 0–0.4)
EOSINOPHIL NFR BLD AUTO: 16 % (ref 0.3–6.2)
ERYTHROCYTE [DISTWIDTH] IN BLOOD BY AUTOMATED COUNT: 16.1 % (ref 12.3–15.4)
HCT VFR BLD AUTO: 24 % (ref 34–46.6)
HGB BLD-MCNC: 7.2 G/DL (ref 12–15.9)
IMM GRANULOCYTES # BLD AUTO: 0.01 10*3/MM3 (ref 0–0.05)
IMM GRANULOCYTES NFR BLD AUTO: 0.3 % (ref 0–0.5)
LYMPHOCYTES # BLD AUTO: 0.44 10*3/MM3 (ref 0.7–3.1)
LYMPHOCYTES NFR BLD AUTO: 15.3 % (ref 19.6–45.3)
MCH RBC QN AUTO: 33.2 PG (ref 26.6–33)
MCHC RBC AUTO-ENTMCNC: 30 G/DL (ref 31.5–35.7)
MCV RBC AUTO: 110.6 FL (ref 79–97)
MONOCYTES # BLD AUTO: 0.17 10*3/MM3 (ref 0.1–0.9)
MONOCYTES NFR BLD AUTO: 5.9 % (ref 5–12)
NEUTROPHILS NFR BLD AUTO: 1.77 10*3/MM3 (ref 1.7–7)
NEUTROPHILS NFR BLD AUTO: 61.5 % (ref 42.7–76)
NRBC BLD AUTO-RTO: 0 /100 WBC (ref 0–0.2)
PLATELET # BLD AUTO: 117 10*3/MM3 (ref 140–450)
PMV BLD AUTO: 9.2 FL (ref 6–12)
RAD ONC ARIA COURSE ID: NORMAL
RAD ONC ARIA COURSE INTENT: NORMAL
RAD ONC ARIA COURSE LAST TREATMENT DATE: NORMAL
RAD ONC ARIA COURSE START DATE: NORMAL
RAD ONC ARIA COURSE TREATMENT ELAPSED DAYS: 2
RAD ONC ARIA FIRST TREATMENT DATE: NORMAL
RAD ONC ARIA PLAN FRACTIONS TREATED TO DATE: 3
RAD ONC ARIA PLAN ID: NORMAL
RAD ONC ARIA PLAN PRESCRIBED DOSE PER FRACTION: 4 GY
RAD ONC ARIA PLAN PRIMARY REFERENCE POINT: NORMAL
RAD ONC ARIA PLAN TOTAL FRACTIONS PRESCRIBED: 5
RAD ONC ARIA PLAN TOTAL PRESCRIBED DOSE: 2000 CGY
RAD ONC ARIA REFERENCE POINT DOSAGE GIVEN TO DATE: 12 GY
RAD ONC ARIA REFERENCE POINT ID: NORMAL
RAD ONC ARIA REFERENCE POINT SESSION DOSAGE GIVEN: 4 GY
RBC # BLD AUTO: 2.17 10*6/MM3 (ref 3.77–5.28)
WBC NRBC COR # BLD: 2.88 10*3/MM3 (ref 3.4–10.8)

## 2023-05-18 PROCEDURE — 77386: CPT | Performed by: STUDENT IN AN ORGANIZED HEALTH CARE EDUCATION/TRAINING PROGRAM

## 2023-05-18 PROCEDURE — 36415 COLL VENOUS BLD VENIPUNCTURE: CPT

## 2023-05-18 PROCEDURE — 77386 CHG INTENSITY MODULATED RADIATION TX DLVR COMPLEX: CPT | Performed by: STUDENT IN AN ORGANIZED HEALTH CARE EDUCATION/TRAINING PROGRAM

## 2023-05-18 PROCEDURE — 77336 RADIATION PHYSICS CONSULT: CPT | Performed by: RADIOLOGY

## 2023-05-18 PROCEDURE — G0463 HOSPITAL OUTPT CLINIC VISIT: HCPCS

## 2023-05-18 PROCEDURE — 85025 COMPLETE CBC W/AUTO DIFF WBC: CPT

## 2023-05-18 PROCEDURE — 77014 CHG CT GUIDANCE RADIATION THERAPY FLDS PLACEMENT: CPT | Performed by: RADIOLOGY

## 2023-05-18 NOTE — NURSING NOTE
"Patient is here for lab review with RN.  CBC reviewed, counts are stable for this patient at this time. Patient has no complaints.  She continues to use a scooter for mobility issues. She reports that she is feeling \"fine\".  Per Dr. Irvin's office notes, transfuse only if hemoglobin less than 7.0.  Copy of labs given to patient and follow up appointment reviewed. Patient is instructed to call the office with any concerns or new symptoms prior to next visit. Patient verbalized understanding and discharged in stable condition.  Lab Results   Component Value Date    WBC 2.88 (L) 05/18/2023    HGB 7.2 (C) 05/18/2023    HCT 24.0 (L) 05/18/2023    .6 (H) 05/18/2023     (L) 05/18/2023         "

## 2023-05-19 ENCOUNTER — HOSPITAL ENCOUNTER (OUTPATIENT)
Dept: RADIATION ONCOLOGY | Facility: HOSPITAL | Age: 63
Discharge: HOME | End: 2023-05-19

## 2023-05-19 DIAGNOSIS — F32.A ANXIETY AND DEPRESSION: ICD-10-CM

## 2023-05-19 DIAGNOSIS — F41.9 ANXIETY AND DEPRESSION: ICD-10-CM

## 2023-05-19 LAB
RAD ONC ARIA COURSE ID: NORMAL
RAD ONC ARIA COURSE INTENT: NORMAL
RAD ONC ARIA COURSE LAST TREATMENT DATE: NORMAL
RAD ONC ARIA COURSE START DATE: NORMAL
RAD ONC ARIA COURSE TREATMENT ELAPSED DAYS: 3
RAD ONC ARIA FIRST TREATMENT DATE: NORMAL
RAD ONC ARIA PLAN FRACTIONS TREATED TO DATE: 4
RAD ONC ARIA PLAN ID: NORMAL
RAD ONC ARIA PLAN PRESCRIBED DOSE PER FRACTION: 4 GY
RAD ONC ARIA PLAN PRIMARY REFERENCE POINT: NORMAL
RAD ONC ARIA PLAN TOTAL FRACTIONS PRESCRIBED: 5
RAD ONC ARIA PLAN TOTAL PRESCRIBED DOSE: 2000 CGY
RAD ONC ARIA REFERENCE POINT DOSAGE GIVEN TO DATE: 16 GY
RAD ONC ARIA REFERENCE POINT ID: NORMAL
RAD ONC ARIA REFERENCE POINT SESSION DOSAGE GIVEN: 4 GY

## 2023-05-19 PROCEDURE — 77386 CHG INTENSITY MODULATED RADIATION TX DLVR COMPLEX: CPT | Performed by: STUDENT IN AN ORGANIZED HEALTH CARE EDUCATION/TRAINING PROGRAM

## 2023-05-19 PROCEDURE — 77386: CPT | Performed by: STUDENT IN AN ORGANIZED HEALTH CARE EDUCATION/TRAINING PROGRAM

## 2023-05-19 PROCEDURE — 77014 CHG CT GUIDANCE RADIATION THERAPY FLDS PLACEMENT: CPT | Performed by: RADIOLOGY

## 2023-05-19 RX ORDER — GABAPENTIN 300 MG/1
300 CAPSULE ORAL NIGHTLY
Qty: 60 CAPSULE | Refills: 2 | Status: SHIPPED | OUTPATIENT
Start: 2023-05-19 | End: 2024-05-18

## 2023-05-19 RX ORDER — SERTRALINE HYDROCHLORIDE 100 MG/1
150 TABLET, FILM COATED ORAL DAILY
Qty: 135 TABLET | Refills: 0 | Status: SHIPPED | OUTPATIENT
Start: 2023-05-19

## 2023-05-22 ENCOUNTER — HOSPITAL ENCOUNTER (OUTPATIENT)
Dept: RADIATION ONCOLOGY | Facility: HOSPITAL | Age: 63
Discharge: HOME | End: 2023-05-22

## 2023-05-22 LAB
RAD ONC ARIA COURSE END DATE: NORMAL
RAD ONC ARIA COURSE ID: NORMAL
RAD ONC ARIA COURSE ID: NORMAL
RAD ONC ARIA COURSE INTENT: NORMAL
RAD ONC ARIA COURSE INTENT: NORMAL
RAD ONC ARIA COURSE LAST TREATMENT DATE: NORMAL
RAD ONC ARIA COURSE LAST TREATMENT DATE: NORMAL
RAD ONC ARIA COURSE START DATE: NORMAL
RAD ONC ARIA COURSE START DATE: NORMAL
RAD ONC ARIA COURSE TREATMENT ELAPSED DAYS: 6
RAD ONC ARIA COURSE TREATMENT ELAPSED DAYS: 6
RAD ONC ARIA FIRST TREATMENT DATE: NORMAL
RAD ONC ARIA FIRST TREATMENT DATE: NORMAL
RAD ONC ARIA PLAN FRACTIONS TREATED TO DATE: 5
RAD ONC ARIA PLAN FRACTIONS TREATED TO DATE: 5
RAD ONC ARIA PLAN ID: NORMAL
RAD ONC ARIA PLAN ID: NORMAL
RAD ONC ARIA PLAN NAME: NORMAL
RAD ONC ARIA PLAN PRESCRIBED DOSE PER FRACTION: 4 GY
RAD ONC ARIA PLAN PRESCRIBED DOSE PER FRACTION: 4 GY
RAD ONC ARIA PLAN PRIMARY REFERENCE POINT: NORMAL
RAD ONC ARIA PLAN PRIMARY REFERENCE POINT: NORMAL
RAD ONC ARIA PLAN TOTAL FRACTIONS PRESCRIBED: 5
RAD ONC ARIA PLAN TOTAL FRACTIONS PRESCRIBED: 5
RAD ONC ARIA PLAN TOTAL PRESCRIBED DOSE: 2000 CGY
RAD ONC ARIA PLAN TOTAL PRESCRIBED DOSE: 2000 CGY
RAD ONC ARIA REFERENCE POINT DOSAGE GIVEN TO DATE: 20 GY
RAD ONC ARIA REFERENCE POINT DOSAGE GIVEN TO DATE: 20 GY
RAD ONC ARIA REFERENCE POINT ID: NORMAL
RAD ONC ARIA REFERENCE POINT ID: NORMAL
RAD ONC ARIA REFERENCE POINT SESSION DOSAGE GIVEN: 4 GY

## 2023-05-22 PROCEDURE — 77386: CPT | Performed by: RADIOLOGY

## 2023-05-22 PROCEDURE — 77014 CHG CT GUIDANCE RADIATION THERAPY FLDS PLACEMENT: CPT | Performed by: RADIOLOGY

## 2023-05-22 PROCEDURE — 77386 CHG INTENSITY MODULATED RADIATION TX DLVR COMPLEX: CPT | Performed by: RADIOLOGY

## 2023-05-25 ENCOUNTER — LAB (OUTPATIENT)
Dept: LAB | Facility: HOSPITAL | Age: 63
End: 2023-05-25
Payer: COMMERCIAL

## 2023-05-25 ENCOUNTER — CLINICAL SUPPORT (OUTPATIENT)
Dept: ONCOLOGY | Facility: HOSPITAL | Age: 63
End: 2023-05-25
Payer: COMMERCIAL

## 2023-05-25 DIAGNOSIS — C50.919 PRIMARY MALIGNANT NEOPLASM OF BREAST WITH METASTASIS: ICD-10-CM

## 2023-05-25 LAB
BASOPHILS # BLD AUTO: 0.03 10*3/MM3 (ref 0–0.2)
BASOPHILS NFR BLD AUTO: 1.5 % (ref 0–1.5)
DEPRECATED RDW RBC AUTO: 68.3 FL (ref 37–54)
EOSINOPHIL # BLD AUTO: 0.26 10*3/MM3 (ref 0–0.4)
EOSINOPHIL NFR BLD AUTO: 13.3 % (ref 0.3–6.2)
ERYTHROCYTE [DISTWIDTH] IN BLOOD BY AUTOMATED COUNT: 17 % (ref 12.3–15.4)
HCT VFR BLD AUTO: 26.5 % (ref 34–46.6)
HGB BLD-MCNC: 8 G/DL (ref 12–15.9)
IMM GRANULOCYTES # BLD AUTO: 0.01 10*3/MM3 (ref 0–0.05)
IMM GRANULOCYTES NFR BLD AUTO: 0.5 % (ref 0–0.5)
LYMPHOCYTES # BLD AUTO: 0.41 10*3/MM3 (ref 0.7–3.1)
LYMPHOCYTES NFR BLD AUTO: 20.9 % (ref 19.6–45.3)
MCH RBC QN AUTO: 33.8 PG (ref 26.6–33)
MCHC RBC AUTO-ENTMCNC: 30.2 G/DL (ref 31.5–35.7)
MCV RBC AUTO: 111.8 FL (ref 79–97)
MONOCYTES # BLD AUTO: 0.13 10*3/MM3 (ref 0.1–0.9)
MONOCYTES NFR BLD AUTO: 6.6 % (ref 5–12)
NEUTROPHILS NFR BLD AUTO: 1.12 10*3/MM3 (ref 1.7–7)
NEUTROPHILS NFR BLD AUTO: 57.2 % (ref 42.7–76)
NRBC BLD AUTO-RTO: 0 /100 WBC (ref 0–0.2)
PLATELET # BLD AUTO: 102 10*3/MM3 (ref 140–450)
PMV BLD AUTO: 9.1 FL (ref 6–12)
RBC # BLD AUTO: 2.37 10*6/MM3 (ref 3.77–5.28)
WBC NRBC COR # BLD: 1.96 10*3/MM3 (ref 3.4–10.8)

## 2023-05-25 PROCEDURE — 36415 COLL VENOUS BLD VENIPUNCTURE: CPT

## 2023-05-25 PROCEDURE — 85025 COMPLETE CBC W/AUTO DIFF WBC: CPT

## 2023-05-25 PROCEDURE — G0463 HOSPITAL OUTPT CLINIC VISIT: HCPCS

## 2023-05-25 NOTE — NURSING NOTE
Patient is here for lab review with RN.  CBC reviewed, abnormal WBC and ANC reviewed with MYLA Calderon NP and no changes in her Verzenio dose at this time.  Reviewed infection precautions with patient and she v/u.  Patient has no complaints. Copy of labs given to patient and follow up appointment reviewed. Patient is instructed to call the office with any concerns or new symptoms prior to next visit. Patient verbalized understanding and discharged in stable condition.  Lab Results   Component Value Date    WBC 1.96 (L) 05/25/2023    HGB 8.0 (L) 05/25/2023    HCT 26.5 (L) 05/25/2023    .8 (H) 05/25/2023     (L) 05/25/2023

## 2023-06-01 ENCOUNTER — CLINICAL SUPPORT (OUTPATIENT)
Dept: ONCOLOGY | Facility: HOSPITAL | Age: 63
End: 2023-06-01
Payer: COMMERCIAL

## 2023-06-01 ENCOUNTER — LAB (OUTPATIENT)
Dept: LAB | Facility: HOSPITAL | Age: 63
End: 2023-06-01
Payer: COMMERCIAL

## 2023-06-01 DIAGNOSIS — C50.919 PRIMARY MALIGNANT NEOPLASM OF BREAST WITH METASTASIS: ICD-10-CM

## 2023-06-01 LAB
ALBUMIN SERPL-MCNC: 3.2 G/DL (ref 3.5–5.2)
ALBUMIN/GLOB SERPL: 0.7 G/DL (ref 1.1–2.4)
ALP SERPL-CCNC: 114 U/L (ref 38–116)
ALT SERPL W P-5'-P-CCNC: 8 U/L (ref 0–33)
ANION GAP SERPL CALCULATED.3IONS-SCNC: 12.7 MMOL/L (ref 5–15)
AST SERPL-CCNC: 19 U/L (ref 0–32)
BASOPHILS # BLD AUTO: 0.03 10*3/MM3 (ref 0–0.2)
BASOPHILS NFR BLD AUTO: 1.6 % (ref 0–1.5)
BILIRUB SERPL-MCNC: 0.5 MG/DL (ref 0.2–1.2)
BUN SERPL-MCNC: 19 MG/DL (ref 6–20)
BUN/CREAT SERPL: 5.4 (ref 7.3–30)
CALCIUM SPEC-SCNC: 9.5 MG/DL (ref 8.5–10.2)
CHLORIDE SERPL-SCNC: 96 MMOL/L (ref 98–107)
CO2 SERPL-SCNC: 26.3 MMOL/L (ref 22–29)
CREAT SERPL-MCNC: 3.54 MG/DL (ref 0.6–1.1)
DEPRECATED RDW RBC AUTO: 64.4 FL (ref 37–54)
EGFRCR SERPLBLD CKD-EPI 2021: 13.8 ML/MIN/1.73
EOSINOPHIL # BLD AUTO: 0.22 10*3/MM3 (ref 0–0.4)
EOSINOPHIL NFR BLD AUTO: 11.8 % (ref 0.3–6.2)
ERYTHROCYTE [DISTWIDTH] IN BLOOD BY AUTOMATED COUNT: 16.3 % (ref 12.3–15.4)
GLOBULIN UR ELPH-MCNC: 4.5 GM/DL (ref 1.8–3.5)
GLUCOSE SERPL-MCNC: 223 MG/DL (ref 74–124)
HCT VFR BLD AUTO: 25.7 % (ref 34–46.6)
HGB BLD-MCNC: 8 G/DL (ref 12–15.9)
IMM GRANULOCYTES # BLD AUTO: 0 10*3/MM3 (ref 0–0.05)
IMM GRANULOCYTES NFR BLD AUTO: 0 % (ref 0–0.5)
LYMPHOCYTES # BLD AUTO: 0.48 10*3/MM3 (ref 0.7–3.1)
LYMPHOCYTES NFR BLD AUTO: 25.7 % (ref 19.6–45.3)
MCH RBC QN AUTO: 33.6 PG (ref 26.6–33)
MCHC RBC AUTO-ENTMCNC: 31.1 G/DL (ref 31.5–35.7)
MCV RBC AUTO: 108 FL (ref 79–97)
MONOCYTES # BLD AUTO: 0.13 10*3/MM3 (ref 0.1–0.9)
MONOCYTES NFR BLD AUTO: 7 % (ref 5–12)
NEUTROPHILS NFR BLD AUTO: 1.01 10*3/MM3 (ref 1.7–7)
NEUTROPHILS NFR BLD AUTO: 53.9 % (ref 42.7–76)
NRBC BLD AUTO-RTO: 0 /100 WBC (ref 0–0.2)
PLATELET # BLD AUTO: 87 10*3/MM3 (ref 140–450)
PMV BLD AUTO: 9.9 FL (ref 6–12)
POTASSIUM SERPL-SCNC: 3.5 MMOL/L (ref 3.5–4.7)
PROT SERPL-MCNC: 7.7 G/DL (ref 6.3–8)
RBC # BLD AUTO: 2.38 10*6/MM3 (ref 3.77–5.28)
SODIUM SERPL-SCNC: 135 MMOL/L (ref 134–145)
WBC NRBC COR # BLD: 1.87 10*3/MM3 (ref 3.4–10.8)

## 2023-06-01 PROCEDURE — 85025 COMPLETE CBC W/AUTO DIFF WBC: CPT

## 2023-06-01 PROCEDURE — G0463 HOSPITAL OUTPT CLINIC VISIT: HCPCS

## 2023-06-01 PROCEDURE — 80053 COMPREHEN METABOLIC PANEL: CPT

## 2023-06-01 PROCEDURE — 36415 COLL VENOUS BLD VENIPUNCTURE: CPT

## 2023-06-01 NOTE — PROGRESS NOTES
Patient is here for lab review with RN.  CBC reviewed, HgB stable 8.0 Plts 87 and ANC 1.01. Reviewed with Dr Irvin and he ordered Abemaciclib morning dose to be reduced from 150 mg  To 100 mg . Patient has no complaints. MTM notified patient needs refill of the  Abemaciclib 100 mg.   Copy of labs given to patient and follow up appointment reviewed. Patient is instructed to call the office with any concerns or new symptoms prior to next visit. Patient verbalized understanding and discharged in stable condition.    Lab Results   Component Value Date    WBC 1.87 (L) 06/01/2023    HGB 8.0 (L) 06/01/2023    HCT 25.7 (L) 06/01/2023    .0 (H) 06/01/2023    PLT 87 (L) 06/01/2023

## 2023-06-02 ENCOUNTER — SPECIALTY PHARMACY (OUTPATIENT)
Dept: PHARMACY | Facility: HOSPITAL | Age: 63
End: 2023-06-02

## 2023-06-02 RX ORDER — ABEMACICLIB 100 MG/1
100 TABLET ORAL 2 TIMES DAILY
Qty: 56 TABLET | Refills: 5 | Status: SHIPPED | OUTPATIENT
Start: 2023-06-02

## 2023-06-02 NOTE — PROGRESS NOTES
Re: Refills of Oral Specialty Medication - Verzenio (abemaciclib)    • Drug-Drug Interactions: The current medication list was reviewed and there are no relevant drug-drug interactions.  • Medication Allergies: The patient has NKDA  • Review of Labs/Dose Adjustments: DOSE CHANGE - I reviewed the most recent note and labs. Due to thrombocytopenia the dose is being decreased. I sent refills as described below.    Drug: Verzenio (abemaciclib)  Strength: 100 mg  Directions: Take 1 tablet by mouth twice a day  Quantity: 56  Refills: 5  Pharmacy prescription sent to: [facility]      Verzenio 150 mg prescription will be discontinued.       Name/Credentials: Wiley España PharmD, JEREMY      6/2/2023  14:38 EDT     Completed independent double check on medication order/RX.  Sophie Hill, [place], BCOP

## 2023-06-07 RX ORDER — FOLIC ACID 1 MG/1
TABLET ORAL
Qty: 30 TABLET | Refills: 2 | Status: SHIPPED | OUTPATIENT
Start: 2023-06-07

## 2023-06-07 NOTE — PROGRESS NOTES
"Chief Complaint  Metastatic lobular breast cancer (ER/[place] positive, HER-2/tj negative), anemia secondary to CKD3, CHF, peripheral neuropathy, chemotherapy related nausea chemotherapy-induced myelosuppression    Subjective        History of Present Illness  The patient returns today in follow-up continuing on monthly Faslodex due for cycle 20 today and continuing on abemaciclib.  She has experienced difficulty with myelosuppression from abemaciclib and has required ongoing further dose reduction, most recently decreased to 100 mg twice daily dosing on 6/1/2023 due to borderline neutropenia and thrombocytopenia.  She has not experienced any significant side effects however from treatment.  She did complete a course of palliative radiation to the new/progressive lesion in the left anterior acetabulum, received 20 Gray in 5 fractions completed on 5/22/2023.  She did not experience any significant side effects.  She continues on home dialysis.  She continues on monthly Mircera injections due next week with nephrology.  She does note some mild dyspnea which is unchanged and she does not feel warrants pursuit of transfusion support at this time.    Objective   Vital Signs:   /70   Pulse 72   Temp 97.9 °F (36.6 °C) (Temporal)   Resp 20   Ht 170.2 cm (67.01\")   SpO2 94%   BMI 57.65 kg/m²     Physical Exam  Constitutional:       Appearance: She is well-developed. She is obese.      Comments: Patient is in a motorized scooter today   Eyes:      Conjunctiva/sclera: Conjunctivae normal.   Neck:      Thyroid: No thyromegaly.   Cardiovascular:      Rate and Rhythm: Normal rate and regular rhythm.      Heart sounds: Murmur heard.     No friction rub. No gallop.      Comments: 2/6 murmur  Pulmonary:      Effort: No respiratory distress.      Breath sounds: Normal breath sounds.   Abdominal:      General: Bowel sounds are normal. There is no distension.      Palpations: Abdomen is soft.      Tenderness: There is no " abdominal tenderness.   Musculoskeletal:         General: Swelling present.      Comments: 1+ bilateral lower extremity edema with venous stasis changes noted.  Left upper extremity fistula   Lymphadenopathy:      Head:      Right side of head: No submandibular adenopathy.      Cervical: No cervical adenopathy.      Upper Body:      Right upper body: No supraclavicular adenopathy.      Left upper body: No supraclavicular adenopathy.   Skin:     General: Skin is warm and dry.      Findings: No bruising or rash.   Neurological:      Mental Status: She is alert and oriented to person, place, and time.      Cranial Nerves: No cranial nerve deficit.      Motor: No abnormal muscle tone.      Deep Tendon Reflexes: Reflexes normal.   Psychiatric:         Behavior: Behavior normal.      Patient was examined today, unchanged from above    Result Review : Reviewed CBC, CMP from today.  Reviewed radiation oncology records.       Assessment and Plan    *Metastatic lobular breast cancer (ER/[place] positive, HER-2/tj negative):  Previous stage IIIC right breast cancer in 2003, received neoadjuvant chemotherapy (unknown regimen) with subsequent bilateral mastectomies 1/22/2004 with right axillary dissection.  Residual 10-12 cm invasive lobular carcinoma on the right breast with 14/16+ nodes with extranodal extension.  Received adjuvant carboplatin and Navelbine chemotherapy x4 cycles  Attempted adjuvant radiation to the chest wall however interrupted due to cellulitis that required removal of implants in August 2004  Received tamoxifen from 6/22/2004 until June 2009.  Transitioned to Femara and received until June 2014  Identification of CT findings concerning for carcinomatosis on CT scans and June 2019.  PET scan confirmed hypermetabolic activity in the omentum as well as small retroperitoneal lymph nodes.  CT-guided omental biopsy 7/30/2019 with metastatic lobular carcinoma of breast primary, ER positive (greater than 95%), AZ  positive (90%), HER-2/tj negative (1+ IHC)  Foundation medicine liquid biopsy 2/5/2020: MSI undetermined, mutations in BETH, NF1, CHEK2.  No evidence of PIK3CA mutation.  Subsequent [facility] germline testing 11/12/2021 with BETH VUS (c.2864C>A) and POLE VUS (c.631A>T)  Initiation of Aromasin and Ibrance in August 2019  Difficulty with myelosuppression related to Ibrance requiring dose alterations, eventually changed to 100 mg for 7 days on followed by 7 days off.  CT chest abdomen pelvis 10/26/2021 with increase in left hydronephrosis with increase in left perinephric and periureteral stranding. Decrease in stone in the left kidney lower pole (7 mm).  Stable small retroperitoneal lymph and left periaortic lymph nodes.  PET scan 11/10/2021 with multiple bilateral hypermetabolic nodular pulmonary lesions, asymmetric moderate to severe left hydronephrosis with ill-defined fat stranding and soft tissue thickening in the renal sinus and surrounding the left ureter, hypermetabolic left retroperitoneal lymph node with slight increase in size, ill-defined hypermetabolic thickening in the inferior omentum with increase in size, uptake and C7 (indeterminate, recommended MRI).  Short segments of uptake in the mid and distal esophagus possibly related to gastritis.  PET scan findings felt to be consistent with progressive malignancy.  Patient declined repeat omental biopsy.   Options for further treatment discussed on 11/12/2021.  In light of renal failure and dialysis, options are more limited.  Recommendation to continue Ibrance and discontinue Aromasin, begin Faslodex.  Discussed possible future use of single agent Taxol.    Aromasin discontinued 11/12/2021  On 11/22/2021 initiation of Faslodex with continuation of Ibrance (100 mg daily for 7 days on followed by 7 days off).  MRI cervical spine 11/18/2021 showed the right C7 normality to likely represent metastatic disease.  With solitary bone lesion, did not recommend pursuit  of bone modifying agent.  Following 2 cycles Faslodex/Ibrance, PET scan on 1/11/2022 showed no change in the soft tissue superior to the dome of the bladder with hypermetabolic activity (likely related to carcinomatosis).  Significant decrease in injection of fat around the left renal pelvis and stable retroperitoneal hypermetabolic lymph nodes, decrease in size and activity in small bilateral pulmonary nodules.  Findings felt to represent evidence of response to treatment.    Ibrance held briefly beginning 1/28/2022 due to hospitalization with COVID-19 infection and C. difficile colitis.  Patient developed leukopenia/neutropenia.  Ibrance resumed at previous dosing (100 mg daily 7 days on followed by 7 days off) on 2/9/22.  Following 5 cycles Faslodex/Ibrance PET scan 4/19/2022 with evidence of mixed response.  New hypermetabolic lesion spinous process L2 (SUV 5.1) and slight increase in activity left clavicular metastasis (SUV increased 4.6-8.1).  C7 hypermetabolic mixed lytic and blastic bone lesion was unchanged.  Decrease in uptake involving omental thickening.  Stable soft tissue thickening around the left renal pelvis and ureter.  Discussion again regarding potential pursuit of IV chemotherapy with Taxol versus continuation of Ibrance and Faslodex with radiation to sites of bony disease at L2, left clavicle, C7.  Patient opted to defer initiation of systemic chemotherapy and continue Ibrance/Faslodex with consideration of radiation.  Initiated monthly Xgeva on 5/19/2022 (cleared with nephrology).  Xgeva subsequently held due to significant hypocalcemia.  Decision made to forego further Xgeva with persistent hypocalcemia.  Palliative radiation received to lumbar spine regarding L2 metastasis 5/23- 6/7/2022  Ibrance held during radiation therapy beginning 5/22/2022.  Ibrance resumed 6/16/2022.  PET scan 7/21/2022 with stable hypermetabolic abdominal pelvic lymph nodes and subcarinal lymph node, stable bone  lesions at C7, left clavicle.  Stable soft tissue thickening around the left renal pelvis with left hydronephrosis.  Uptake in adrenal glands felt to be likely reactive (no change in size).  No activity noted at L2 (previously radiated).  New hypermetabolic lesion right anterior sixth rib.  With evidence of further slight progression in bone on PET scan (new right sixth rib lesion), on 7/28/2022 discontinued Ibrance and plan to transition to abemaciclib with continuation of Faslodex.    On 8/4/2022 initiated abemaciclib at reduced dose of 50 mg twice daily.  On 8/25/2022, increased abemaciclib dose to 100 mg twice daily  PET scan 10/6/2022 with stable findings with exception of left acetabular activity report noted new activity however on prior PET scan question of focal activity present previously.  No corresponding CT abnormality and significant increase in SUV at sacral lesion.  Scan otherwise stable.  Decision to continue current treatment  Abemaciclib dose increased on 10/13/2022 up to 150 mg twice daily  PET scan 12/29/2022 with artifactual accumulation of tracer right axilla and right mastectomy bed without visualized CT abnormality.  Hypermetabolic bone lesions with decrease in level of activity.  No new findings.  PET scan 3/30/2023 with minimal/insignificant increase in activity in multiple bone lesions.  1 new focus of hypermetabolic activity left anterior acetabulum (SUV 10.7) however previously identified activity anterolateral left acetabulum is no longer present.  Subcutaneous fluid collection lateral right anterior chest wall nearly completely resolved.  Due to continued clinical benefit from Faslodex/abemaciclib and with only 1 potential new finding in left acetabulum on PET scan, elected to continue current treatment at visit on 4/13/2023 and pursue MRI pelvis to evaluate left acetabulum with potential pursuit of radiation to solitary new left acetabular lesion.  Abemaciclib held 4/13/2023 due to  neutropenia (ANC 0.95) and thrombocytopenia (platelet count 93,000).  On 4/27/2023 resumed abemaciclib 150 mg in the morning and subsequently added 100 mg dose in the evening on 5/6/2023.    Patient developed significant hematuria which had originally been interpreted as vaginal bleeding.  She then experienced migration and loss of her left ureteral stent spontaneously.  On 5/5/2023 she underwent replacement of left ureteral stent as well as cauterization of a bleeding site in the bladder.  She underwent at the same setting D&C with biopsy that showed no evidence of endometrium, minute fragment of benign endocervical mucosa.  Complete resolution of the hematuria.   MRI pelvis 5/8/2023 showed multiple areas of metastatic involvement in the pelvis, largest lesion 2.7 cm involving the anterior left acetabulum (corresponding to the new PET positive lesion).  No prior comparison.  Additional subtle 1 cm left iliac wing, 1.9 cm S2, 1.7 cm right iliac crest, 1.4 cm left pubic ramus, and 2.7 cm posterior right acetabular lesions noted.  There were several other subcentimeter foci of abnormal marrow signal in the sacrum and right iliac bone of unclear significance.  Patient received palliative radiation to the new/progressive lesion in the left anterior acetabulum, received 20 Gray in 5 fractions completed on 5/22/2023.  Due to continued myelosuppression, then the cycle of dose decreased further from 150 mg a.m. and 100 mg p.m. down to 100 mg twice daily on 6/1/2023.  Patient returns today in follow-up due for cycle 20 Faslodex 500 mg IM every 4 weeks and continuing on abemaciclib at reduced dose of 100 mg twice daily.  Abemaciclib dose has been decreased further due to ongoing difficulties with myelosuppression.  Today, WBC is slightly improved at 2.11 with ANC 1.21 and platelet count up to 109,000, hemoglobin stable at 7.9.  Continue current dose abemaciclib for now and proceed with Faslodex as planned.  Clinically she is  doing quite well.  Her dyspnea is stable and she does not feel that she needs transfusion support at this time.  She would like if possible to return to every 2-week monitoring of her counts which seems reasonable with the improvement today.  Therefore she will return in 2 weeks for CBC and RN review.  In 3 weeks she will undergo repeat PET scan and I will see her in 4 weeks for follow-up when she is due for cycle 21 Faslodex pending PET scan results.    *Anemia secondary to ESRD, underlying malignancy/chronic disease, myelosuppression from CDK 4/6 inhibitor, GI blood loss:  Anemia secondary to ESRD, malignancy with exacerbation by use of Ibrance  Previous evidence of folate deficiency 8/12/2019 with level 3.84, initiated folic acid 1 mg daily  Previous evidence of iron deficiency, received Injectafer on 8/7/2020 750 mg IV x1 dose.  Second dose not administered due to onset of fever, subsequent iron studies precluded further administration of IV iron.  Previous low normal B12 level initiated oral B12 1000 mcg daily.  Patient had previously received Procrit and required intermittent transfusion support  Following initiation of hemodialysis, management of BEAU transitioned to nephrology, receiving Epogen with dialysis.  Ongoing transfusion support prompted alteration in Ibrance dosing on 8/23/2021.  After alteration in Ibrance dosing, hemoglobin improved and remained stable in the 9-10 range.  Improved but ongoing intermittent need for transfusion support despite Ibrance dose alteration  Stool negative for occult blood x3 on 11/2/2021  Patient with reduced dialysis frequency to 2 days/week with concurrent decrease in Epogen dosing corresponding again to increased transfusion requirement.  Epogen dose increased per nephrology to 20,000 units twice weekly with dialysis on Mondays and Fridays  Ibrance again exacerbating anemia with ongoing intermittent transfusion requirements.  Ibrance subsequently discontinued on  7/28/2022 and patient initiated abemaciclib on 8/4/2022 which may be less myelosuppressive.  Additional transfusions required with hemoglobin on 3/31/2022 6.6, hemoglobin 4/21/2022 of 6.4, hemoglobin on 5/5/2022 of 6.8, hemoglobin 6.4 on 7/14/2022, hemoglobin 6.4 on 7/28/2022, hemoglobin 6.8 on 8/18/2022.  Patient did develop transient visible blood in stool in July 2022  Anemia evaluation 7/28/2022 with iron 32, ferritin 412, iron saturation 15%, TIBC 210, folate 19.9, B12 925, haptoglobin 300, .  Stool positive for occult blood x3 on 8/1/2022  Patient was seen by GI on 8/16/2022, felt to be high risk for endoscopic evaluation and elected to forego EGD/colonoscopy for now  Patient transitioned from hemodialysis in clinic to home hemodialysis 5 days/week x 2 hours beginning 8/29/2022.  With transition to home dialysis, nephrology transition the patient from Epogen to Mircera administered every 4 weeks in their office, initiated 8/22/2022.  Symptomatic hemoglobin 7.3 on 2/16/2023, received transfusion  Additional labs on 3/16/2023 with B12 1146, folate greater than 20  Patient with significant gross hematuria with left ureteral stent migration and eventual loss.  Anemia worsened during this timeframe requiring transfusion support on 4/20/2023 with hemoglobin 6.1 and 5/3/2023 with hemoglobin 6.3.  Today, hemoglobin is fairly stable at 7.9.  She continues to receive monthly Mircera under the direction of nephrology and is due for her next treatment next week.  We will continue to monitor counts weekly and transfuse for hemoglobin less than 7.0 or for symptomatic hemoglobin in the 7-8 range.    *ESRD on HD:  Patient with previous CKD3/4  Hospitalization 4/29-5/4/2021 with RYAN/CKD, UTI, anemia.  Required initiation of hemodialysis Tuesday Thursday Saturday.   Decrease in frequency of dialysis to twice per week.  She continues to produce a significant amount of urine.   Status post left upper extremity AV fistula  placement on 11/3/2021 by vascular surgery  Attempt to hold further dialysis on 1/11/2022 with close monitoring of her laboratory and clinical parameters.  Dialysis quickly resumed due to development of hyperkalemia.  Patient was undergoing dialysis 2 days/week on Mondays and Fridays.    On 8/22/2022 patient transitioned to use of home dialysis device 5 days/week x 2 hours.  Patient continues on home hemodialysis 5 days/week.      *CHF with severe aortic stenosis:  Echocardiogram 7/12/2019 showing ejection fraction 48% with moderate dilation of the LV cavity, global hypokinesis, grade 2 diastolic dysfunction, mild to moderate aortic stenosis, trivial pericardial effusion.  Echocardiogram 7/19/2021 with ejection fraction 56%, left atrial volume moderately increased, grade 2 diastolic dysfunction, severe calcification aortic valve, moderate aortic stenosis, severe mitral calcification, mild mitral stenosis, marked elevation in RVSP from tricuspid regurgitation.  Echocardiogram on 7/13/2022 with ejection fraction 56-60%, grade 2 diastolic dysfunction, severe aortic stenosis.    Cardiac catheterization 8/23/2022 showed normal coronary arteries, mild to moderate pulmonary hypertension.    She was evaluated by cardiothoracic surgery Dr. Pagan and there was discussion regarding potential candidacy for TAVR although there was concern given her underlying comorbidities including her metastatic breast cancer.  I discussed patient's prognosis with Dr. Pagan.  She does have opportunities for additional treatment of her malignancy with intravenous chemotherapy and is willing to pursue this in the future when needed.  It is unclear as to her expected survival however given her multiple severe comorbidities with metastatic breast cancer, ESRD on dialysis, severe aortic stenosis, severe anemia.  There is consideration of possible pursuit of balloon valvuloplasty initially to assess her symptomatic response and then consider pursuit  of TAVR in the future.   Patient did undergo aortic valvuloplasty during admission 1/10 - 1/11/2023.    *Left upper and bilateral lower extremity peripheral neuropathy:  Patient reports that left upper extremity neuropathy was not present from previous chemotherapy but occurred after hospitalization that required intubation and critical care stay.  Apparently felt to represent critical care neuropathy.  Improved over time.  Bilateral lower extremity neuropathy felt to be related to diabetes  May have future implications regarding treatment for breast cancer.  Patient reports that peripheral neuropathy symptoms continue in the bilateral lower extremities, unchanged.  This will be a consideration with potential future use of Taxol    *Uterine/endometrial activity on PET scan:  Patient experienced postmenopausal vaginal bleeding in 2013, negative endometrial biopsy and gynecologic evaluation.  With findings on PET scan with enlarging uterus and some hypermetabolic activity, referred back to Dr. Oliveros in gynecology.    9/19/2019 D&C with hysteroscopy by Dr. Oliveros, no significant intraoperative findings, pathologic results with benign findings, no evidence of malignancy.  Patient with question of vaginal bleeding however subsequently became evident that this was hematuria.  She was referred to gynecology, did undergo on 5/5/2023 D&C at the same time as cystoscopy, biopsy with no evidence of malignancy but no endometrium present.    *Nausea:  Mild, relieved with Zofran.   Patient experienced improvement in nausea after initiating hemodialysis  No recent nausea    *Myelosuppression secondary to CDK 4/6 inhibitor:  Patient was able to maintain adequate WBC after dosing alteration on Ibrance to treatment at 100 mg daily for 7 days on followed by 7 days off.  Subsequent transition from Ibrance to abemaciclib  Overall counts improved on abemaciclib  Patient with decline in counts felt to be secondary to abemaciclib.  On  4/13/2023 abemaciclib held with WBC 1.82/ANC 0.95, platelet count 93,000. On 4/27/2023 resumed abemaciclib 150 mg in the morning and subsequently added 100 mg dose in the evening on 5/6/2023.  On 6/1/2023, further decrease in abemaciclib dose to 100 mg twice daily due to neutropenia and thrombocytopenia.  Patient today with counts that have improved slightly on current dose of minocycline 100 mg twice daily with WBC 2.11, ANC 1.21, platelet count improved at 109,000.  As above, patient would like to transition back to every 2-week monitoring of her counts which seems reasonable given the improvement seen today.    *Depression  Patient with history of depression, worsened after the death of her son in November 2021  With evidence of progressive malignancy in November 2021, patient agreeable to referral to supportive oncology  Patient currently receiving gabapentin 300 mg nightly, Zoloft 150 mg daily  Patient does continue with difficulties regarding depression that wax and wane.  Currently symptoms under fair control.  Patient was previously seeing supportive oncology.  She does not feel that she needs further follow-up at this time however would not rule out returning to see supportive oncology in the future if needed.  We will prescribe her Zoloft at this point.  She remains off of armodafinil.    *Left perinephric stranding secondary to malignancy with hydronephrosis:  CT 4/22/2021 showed interval enlargement of 2 staghorn calculi in the left kidney with persistent left perinephric stranding  Hospitalization 4/29-5/4/2021 with RYAN/CKD, UTI, anemia.  Required 2 unit PRBC transfusion and initiated hemodialysis Tuesday Thursday Saturday.    Discussion from urology regarding potential need for surgical procedure to address staghorn calculus left kidney  CT scan 7/2/2021 with only 1 remaining left renal stone identified which had decreased in size.  Patient appears to have passed the other stone.  As above, CT  10/26/2021 with more prominent left hydronephrosis and increase in left perinephric and periureteral stranding.  Felt to possibly be related to passage of recent stone versus partial obstruction secondary to debris or thrombus in the left ureter versus pyelonephritis.  Patient however with no clinical evidence of recent stone passage nor pyelonephritis. Malignancy felt to be the cause of CT changes around the left kidney at this point.   Left ureteral stent replacement 12/16/2021  PET scan 1/11/2022 with decrease in fat injection near left renal pelvis, stent in satisfactory position.  Mild residual hydronephrosis on the left.  Ureteral stent replaced on 3/10/2022  PET scan 4/19/2022 with no hydronephrosis, left ureteral stent in place  PET scan 7/21/2022 with persistent left hydronephrosis, ureteral stent in place  PET scan 10/6/2022 with left nephroureteral stent in place, overall stable findings  PET scan 12/29/2022 with left nephroureteral stent remaining in place, stable findings  PET scan 3/30/2023 with left ureteral stent in place.  Patient developed gross hematuria with migration and spontaneous loss of left ureteral stent.  Patient underwent stent replacement on 5/5/2023.  Cauterization of bleeding focus in the bladder.  Resolution of hematuria.    *Right thyroid nodules  Patient underwent prior thyroid biopsy by her report with benign findings many years ago, records not available  On MRI cervical spine 11/18/2021, finding of 1.8 cm right thyroid nodule  Thyroid ultrasound 11/26/2021 with right-sided thyroid nodules, 1 nodule was hypoechoic, solid measuring 2 cm with biopsy recommended.  Thyroid ultrasound results reviewed with patient.  In light of her metastatic breast cancer, renal failure the significance of the thyroid nodule is felt to be much less.  We discussed possibility of thyroid biopsy however patient is inclined to forego this, especially since she has had a biopsy performed in the past with  benign findings by her report.    No hypermetabolic activity identified on PET scan 1/11/2022 in the neck    *Hospitalization 1/28-1/30/2022 due to COVID-19 infection and C. difficile colitis  Patient fully vaccinated with 3 doses Moderna COVID-19 vaccine  Patient developed symptoms 1/26/2022 with abrupt onset sinus congestion, mild cough, subsequently developed nausea and diarrhea on 1/27/2022.  Significant fatigue.  Patient tested positive in emergency department on 1/28/2022 and was admitted  Patient maintained O2 saturation in mid 90% range on room air  Patient received remdesivir  Patient was also found to be positive for C. difficile colitis, received course of oral vancomycin.  Symptoms from both COVID-19 and C. difficile colitis ultimately resolved.    *Hypocalcemia  Patient developed significant hypocalcemia after receiving Xgeva on 5/19/2022  Calcium management per nephrology  No further Xgeva planned due to persistent significant hypocalcemia  Calcium today 9.8    Plan:  Patient will proceed with Faslodex 500 mg IM injection today and continue every 28 days (today is cycle 20)  Continue abemaciclib 100 mg twice daily  Patient is receiving Mircera under the direction of nephrology, dosing every 4 weeks (due next week) as well as IV iron intermittently as needed (none recently).  Continue to monitor hemoglobin closely regarding ongoing transfusion needs for hemoglobin less than 7.0.   In 2 weeks CBC and RN review  In 3 weeks PET scan  In 4 weeks MD visit with CBC, CMP and cycle 21 Faslodex pending PET scan results.    Patient continues on high risk medication requiring intensive monitoring.    I did spend 40 minutes in total time caring for the patient today, time spent in review of records, face-to-face consultation, coordination of care, placement of orders, completion of documentation.

## 2023-06-08 ENCOUNTER — OFFICE VISIT (OUTPATIENT)
Dept: ONCOLOGY | Facility: CLINIC | Age: 63
End: 2023-06-08
Payer: COMMERCIAL

## 2023-06-08 ENCOUNTER — LAB (OUTPATIENT)
Dept: LAB | Facility: HOSPITAL | Age: 63
End: 2023-06-08
Payer: COMMERCIAL

## 2023-06-08 ENCOUNTER — INFUSION (OUTPATIENT)
Dept: ONCOLOGY | Facility: HOSPITAL | Age: 63
End: 2023-06-08
Payer: COMMERCIAL

## 2023-06-08 VITALS
HEIGHT: 67 IN | SYSTOLIC BLOOD PRESSURE: 118 MMHG | TEMPERATURE: 97.9 F | HEART RATE: 72 BPM | OXYGEN SATURATION: 94 % | BODY MASS INDEX: 57.65 KG/M2 | DIASTOLIC BLOOD PRESSURE: 70 MMHG | RESPIRATION RATE: 20 BRPM

## 2023-06-08 DIAGNOSIS — C50.919 PRIMARY MALIGNANT NEOPLASM OF BREAST WITH METASTASIS: Primary | ICD-10-CM

## 2023-06-08 DIAGNOSIS — C50.919 PRIMARY MALIGNANT NEOPLASM OF BREAST WITH METASTASIS: ICD-10-CM

## 2023-06-08 LAB
ALBUMIN SERPL-MCNC: 3.5 G/DL (ref 3.5–5.2)
ALBUMIN/GLOB SERPL: 0.8 G/DL (ref 1.1–2.4)
ALP SERPL-CCNC: 111 U/L (ref 38–116)
ALT SERPL W P-5'-P-CCNC: 9 U/L (ref 0–33)
ANION GAP SERPL CALCULATED.3IONS-SCNC: 12.6 MMOL/L (ref 5–15)
AST SERPL-CCNC: 16 U/L (ref 0–32)
BASOPHILS # BLD AUTO: 0.04 10*3/MM3 (ref 0–0.2)
BASOPHILS NFR BLD AUTO: 1.9 % (ref 0–1.5)
BILIRUB SERPL-MCNC: 0.4 MG/DL (ref 0.2–1.2)
BUN SERPL-MCNC: 42 MG/DL (ref 6–20)
BUN/CREAT SERPL: 6.7 (ref 7.3–30)
CALCIUM SPEC-SCNC: 9.8 MG/DL (ref 8.5–10.2)
CHLORIDE SERPL-SCNC: 98 MMOL/L (ref 98–107)
CO2 SERPL-SCNC: 26.4 MMOL/L (ref 22–29)
CREAT SERPL-MCNC: 6.27 MG/DL (ref 0.6–1.1)
DEPRECATED RDW RBC AUTO: 63.9 FL (ref 37–54)
EGFRCR SERPLBLD CKD-EPI 2021: 7 ML/MIN/1.73
EOSINOPHIL # BLD AUTO: 0.15 10*3/MM3 (ref 0–0.4)
EOSINOPHIL NFR BLD AUTO: 7.1 % (ref 0.3–6.2)
ERYTHROCYTE [DISTWIDTH] IN BLOOD BY AUTOMATED COUNT: 16 % (ref 12.3–15.4)
GLOBULIN UR ELPH-MCNC: 4.5 GM/DL (ref 1.8–3.5)
GLUCOSE SERPL-MCNC: 275 MG/DL (ref 74–124)
HCT VFR BLD AUTO: 25.6 % (ref 34–46.6)
HGB BLD-MCNC: 7.9 G/DL (ref 12–15.9)
IMM GRANULOCYTES # BLD AUTO: 0 10*3/MM3 (ref 0–0.05)
IMM GRANULOCYTES NFR BLD AUTO: 0 % (ref 0–0.5)
LYMPHOCYTES # BLD AUTO: 0.56 10*3/MM3 (ref 0.7–3.1)
LYMPHOCYTES NFR BLD AUTO: 26.5 % (ref 19.6–45.3)
MCH RBC QN AUTO: 33.3 PG (ref 26.6–33)
MCHC RBC AUTO-ENTMCNC: 30.9 G/DL (ref 31.5–35.7)
MCV RBC AUTO: 108 FL (ref 79–97)
MONOCYTES # BLD AUTO: 0.15 10*3/MM3 (ref 0.1–0.9)
MONOCYTES NFR BLD AUTO: 7.1 % (ref 5–12)
NEUTROPHILS NFR BLD AUTO: 1.21 10*3/MM3 (ref 1.7–7)
NEUTROPHILS NFR BLD AUTO: 57.4 % (ref 42.7–76)
NRBC BLD AUTO-RTO: 0 /100 WBC (ref 0–0.2)
PLATELET # BLD AUTO: 109 10*3/MM3 (ref 140–450)
PMV BLD AUTO: 9.7 FL (ref 6–12)
POTASSIUM SERPL-SCNC: 4.6 MMOL/L (ref 3.5–4.7)
PROT SERPL-MCNC: 8 G/DL (ref 6.3–8)
RBC # BLD AUTO: 2.37 10*6/MM3 (ref 3.77–5.28)
SODIUM SERPL-SCNC: 137 MMOL/L (ref 134–145)
WBC NRBC COR # BLD: 2.11 10*3/MM3 (ref 3.4–10.8)

## 2023-06-08 PROCEDURE — 36415 COLL VENOUS BLD VENIPUNCTURE: CPT

## 2023-06-08 PROCEDURE — 25010000002 FULVESTRANT PER 25 MG: Performed by: INTERNAL MEDICINE

## 2023-06-08 PROCEDURE — 85025 COMPLETE CBC W/AUTO DIFF WBC: CPT

## 2023-06-08 PROCEDURE — 96402 CHEMO HORMON ANTINEOPL SQ/IM: CPT

## 2023-06-08 PROCEDURE — 80053 COMPREHEN METABOLIC PANEL: CPT

## 2023-06-08 RX ORDER — LAMOTRIGINE 25 MG/1
500 TABLET ORAL ONCE
Status: CANCELLED
Start: 2023-06-08 | End: 2023-06-08

## 2023-06-08 RX ORDER — LAMOTRIGINE 25 MG/1
500 TABLET ORAL ONCE
Status: COMPLETED | OUTPATIENT
Start: 2023-06-08 | End: 2023-06-08

## 2023-06-08 RX ADMIN — FULVESTRANT 500 MG: 50 INJECTION, SOLUTION INTRAMUSCULAR at 13:47

## 2023-06-14 ENCOUNTER — SPECIALTY PHARMACY (OUTPATIENT)
Dept: PHARMACY | Facility: HOSPITAL | Age: 63
End: 2023-06-14
Payer: COMMERCIAL

## 2023-06-14 NOTE — PROGRESS NOTES
Pt called [facility] stating her Verzen[employer] has a cost at [facility].  I called the pharmacy to determine what was going on.  Pt is to call the  at [phone] to have more funds added onto the co-pay assistance card per the pharmacy.  The pharmacy rep stated that this usually happens.  I called pt to give her the telephone number to  to do this.  I also gave her [facility]'s direct number to re-process script once taken care of.    Juana Borrego  Specialty Pharmacy Technician

## 2023-07-13 NOTE — TELEPHONE ENCOUNTER
Caller: Juana Hall    Relationship: Self    Best call back number: [phone]    What is the best time to reach you: ANYTIME    Who are you requesting to speak with (clinical staff, provider, specific staff member): SCHEDULING    What was the call regarding: PATIENT CALLING TO SCHEDULE HER 2 WK AND 4 WK APPTS - PLEASE CALL TO ADVISE.

## 2023-07-19 PROBLEM — R30.0 DYSURIA: Status: ACTIVE | Noted: 2023-07-19

## 2023-07-25 NOTE — TELEPHONE ENCOUNTER
Caller: Juana Hall    Relationship: Self    Best call back number: [phone]    What is the best time to reach you: ANYTIME    Who are you requesting to speak with (clinical staff, provider,  specific staff member): CLINICAL    What was the call regarding: PATIENT IS REQUESTING A CALLBACK TO GO OVER URINE CULTURE RESULTS FROM 7/19/23.

## 2023-07-26 ENCOUNTER — LAB (OUTPATIENT)
Dept: LAB | Facility: HOSPITAL | Age: 63
End: 2023-07-26
Payer: COMMERCIAL

## 2023-07-26 ENCOUNTER — CLINICAL SUPPORT (OUTPATIENT)
Dept: ONCOLOGY | Facility: HOSPITAL | Age: 63
End: 2023-07-26
Payer: COMMERCIAL

## 2023-07-26 DIAGNOSIS — C50.919 PRIMARY MALIGNANT NEOPLASM OF BREAST WITH METASTASIS: ICD-10-CM

## 2023-07-26 LAB
BASOPHILS # BLD AUTO: 0.05 10*3/MM3 (ref 0–0.2)
BASOPHILS NFR BLD AUTO: 1.3 % (ref 0–1.5)
DEPRECATED RDW RBC AUTO: 58 FL (ref 37–54)
EOSINOPHIL # BLD AUTO: 0.24 10*3/MM3 (ref 0–0.4)
EOSINOPHIL NFR BLD AUTO: 6.4 % (ref 0.3–6.2)
ERYTHROCYTE [DISTWIDTH] IN BLOOD BY AUTOMATED COUNT: 16.7 % (ref 12.3–15.4)
HCT VFR BLD AUTO: 24.4 % (ref 34–46.6)
HGB BLD-MCNC: 8.2 G/DL (ref 12–15.9)
IMM GRANULOCYTES # BLD AUTO: 0.03 10*3/MM3 (ref 0–0.05)
IMM GRANULOCYTES NFR BLD AUTO: 0.8 % (ref 0–0.5)
LYMPHOCYTES # BLD AUTO: 0.77 10*3/MM3 (ref 0.7–3.1)
LYMPHOCYTES NFR BLD AUTO: 20.6 % (ref 19.6–45.3)
MCH RBC QN AUTO: 33.7 PG (ref 26.6–33)
MCHC RBC AUTO-ENTMCNC: 33.6 G/DL (ref 31.5–35.7)
MCV RBC AUTO: 100.4 FL (ref 79–97)
MONOCYTES # BLD AUTO: 0.25 10*3/MM3 (ref 0.1–0.9)
MONOCYTES NFR BLD AUTO: 6.7 % (ref 5–12)
NEUTROPHILS NFR BLD AUTO: 2.39 10*3/MM3 (ref 1.7–7)
NEUTROPHILS NFR BLD AUTO: 64.2 % (ref 42.7–76)
NRBC BLD AUTO-RTO: 0 /100 WBC (ref 0–0.2)
PLATELET # BLD AUTO: 143 10*3/MM3 (ref 140–450)
PMV BLD AUTO: 9.6 FL (ref 6–12)
RBC # BLD AUTO: 2.43 10*6/MM3 (ref 3.77–5.28)
WBC NRBC COR # BLD: 3.73 10*3/MM3 (ref 3.4–10.8)

## 2023-07-26 PROCEDURE — 85025 COMPLETE CBC W/AUTO DIFF WBC: CPT

## 2023-07-26 PROCEDURE — 36415 COLL VENOUS BLD VENIPUNCTURE: CPT

## 2023-07-26 NOTE — PROGRESS NOTES
Lab Results   Component Value Date    WBC 3.73 07/26/2023    HGB 8.2 (L) 07/26/2023    HCT 24.4 (L) 07/26/2023    .4 (H) 07/26/2023     07/26/2023     Patient is here for lab review with RN. CBC reviewed, counts are stable for this patient at this time. Patient has no complaints. Copy of labs given to patient and follow up appointment reviewed. Patient is instructed to call the office with any concerns or new symptoms prior to next visit. Patient verbalized understanding and discharged in stable condition.

## 2023-07-31 ENCOUNTER — LAB (OUTPATIENT)
Dept: LAB | Facility: HOSPITAL | Age: 63
End: 2023-07-31
Payer: COMMERCIAL

## 2023-07-31 DIAGNOSIS — R19.7 DIARRHEA, UNSPECIFIED TYPE: ICD-10-CM

## 2023-07-31 DIAGNOSIS — Z92.25 HISTORY OF IMMUNOSUPPRESSION: ICD-10-CM

## 2023-07-31 LAB
ADV 40+41 DNA STL QL NAA+NON-PROBE: NOT DETECTED
ASTRO TYP 1-8 RNA STL QL NAA+NON-PROBE: NOT DETECTED
C CAYETANENSIS DNA STL QL NAA+NON-PROBE: NOT DETECTED
C COLI+JEJ+UPSA DNA STL QL NAA+NON-PROBE: NOT DETECTED
C DIFF GDH + TOXINS A+B STL QL IA.RAPID: NEGATIVE
C DIFF TOX GENS STL QL NAA+PROBE: POSITIVE
CRYPTOSP DNA STL QL NAA+NON-PROBE: NOT DETECTED
E HISTOLYT DNA STL QL NAA+NON-PROBE: NOT DETECTED
EAEC PAA PLAS AGGR+AATA ST NAA+NON-PRB: NOT DETECTED
EC STX1+STX2 GENES STL QL NAA+NON-PROBE: NOT DETECTED
EPEC EAE GENE STL QL NAA+NON-PROBE: NOT DETECTED
ETEC LTA+ST1A+ST1B TOX ST NAA+NON-PROBE: NOT DETECTED
G LAMBLIA DNA STL QL NAA+NON-PROBE: NOT DETECTED
NOROVIRUS GI+II RNA STL QL NAA+NON-PROBE: NOT DETECTED
P SHIGELLOIDES DNA STL QL NAA+NON-PROBE: NOT DETECTED
RVA RNA STL QL NAA+NON-PROBE: NOT DETECTED
S ENT+BONG DNA STL QL NAA+NON-PROBE: NOT DETECTED
SAPO I+II+IV+V RNA STL QL NAA+NON-PROBE: NOT DETECTED
SHIGELLA SP+EIEC IPAH ST NAA+NON-PROBE: NOT DETECTED
V CHOL+PARA+VUL DNA STL QL NAA+NON-PROBE: NOT DETECTED
V CHOLERAE DNA STL QL NAA+NON-PROBE: NOT DETECTED
Y ENTEROCOL DNA STL QL NAA+NON-PROBE: NOT DETECTED

## 2023-07-31 PROCEDURE — 87507 IADNA-DNA/RNA PROBE TQ 12-25: CPT | Performed by: INTERNAL MEDICINE

## 2023-07-31 PROCEDURE — 87493 C DIFF AMPLIFIED PROBE: CPT | Performed by: INTERNAL MEDICINE

## 2023-07-31 PROCEDURE — 87449 NOS EACH ORGANISM AG IA: CPT | Performed by: INTERNAL MEDICINE

## 2023-08-01 ENCOUNTER — TELEPHONE (OUTPATIENT)
Dept: ONCOLOGY | Facility: CLINIC | Age: 63
End: 2023-08-01
Payer: COMMERCIAL

## 2023-08-08 NOTE — PROGRESS NOTES
Chief Complaint  Metastatic lobular breast cancer (ER/AL positive, HER-2/tj negative), anemia secondary to CKD3, CHF, peripheral neuropathy, chemotherapy related nausea chemotherapy-induced myelosuppression    Subjective        History of Present Illness  The patient returns today in follow-up with laboratory studies to review continuing on monthly Faslodex due for cycle 22 today and continuing on abemaciclib.  She has experienced difficulty with myelosuppression from abemaciclib and has required dose reduction, currently receiving 100 mg twice daily dosing.  She does continue on home dialysis 5 days/week and reports that this has been going very well.  She does continue on monthly Mircera and IV iron with nephrology, due to receive next dosing next week.    Over the past month, the patient has had a number of issues.  She developed a UTI and received a course of Bactrim x 3 days from Dr. Barone with urine culture on 7/19/2023 that grew E. coli greater than 100,000 colonies.  She had notified our office of significant diarrhea that has been going on intermittently for a month or so but was unresponsive to Imodium.  It was felt that this likely was related to abemaciclib and we did prescribe Lomotil for her to use as well.  We did request that she return a stool specimen for C. difficile and GI PCR analysis.  She did not bring in the specimen until 7/31/2023.  GI PCR was negative and C. difficile toxin was negative however C. difficile PCR was positive (history of C. difficile colitis).  The diarrhea had improved and therefore we did not pursue treatment for C. difficile given the pattern of testing.  Patient today however reports that the diarrhea has persisted.  She requires at least 2 Imodium per day and 1 Lomotil per day to control symptoms.  She has had some nausea and a few episodes of emesis as well.  She also notes that her blood pressure has been low recently, has been in the 90/40 range and she has been  mildly symptomatic.  Today in the office she is 88/56.  She has not notified her nephrologist.      Objective   Vital Signs:   BP (!) 88/56   Pulse 76   Temp 98.4 øF (36.9 øC) (Temporal)   Resp 18   SpO2 94%     Physical Exam  Constitutional:       Appearance: She is well-developed. She is obese.      Comments: Patient is in a motorized scooter today   Eyes:      Conjunctiva/sclera: Conjunctivae normal.   Neck:      Thyroid: No thyromegaly.   Cardiovascular:      Rate and Rhythm: Normal rate and regular rhythm.      Heart sounds: Murmur heard.     No friction rub. No gallop.      Comments: 2/6 murmur  Pulmonary:      Effort: No respiratory distress.      Breath sounds: Normal breath sounds.   Abdominal:      General: Bowel sounds are normal. There is no distension.      Palpations: Abdomen is soft.      Tenderness: There is no abdominal tenderness.   Musculoskeletal:         General: Swelling present.      Comments: 1+ bilateral lower extremity edema with venous stasis changes noted.  Left upper extremity fistula   Lymphadenopathy:      Head:      Right side of head: No submandibular adenopathy.      Cervical: No cervical adenopathy.      Upper Body:      Right upper body: No supraclavicular adenopathy.      Left upper body: No supraclavicular adenopathy.   Skin:     General: Skin is warm and dry.      Findings: No bruising or rash.   Neurological:      Mental Status: She is alert and oriented to person, place, and time.      Cranial Nerves: No cranial nerve deficit.      Motor: No abnormal muscle tone.      Deep Tendon Reflexes: Reflexes normal.   Psychiatric:         Behavior: Behavior normal.      Patient was examined today, unchanged from above    Result Review : Reviewed CBC, CMP from today.  Reviewed stool studies from 7/31/2023 and urine culture from 7/19/2023.  Reviewed PCP records.       Assessment and Plan    *Metastatic lobular breast cancer (ER/ID positive, HER-2/tj negative):  Previous stage IIIC  right breast cancer in 2003, received neoadjuvant chemotherapy (unknown regimen) with subsequent bilateral mastectomies 1/22/2004 with right axillary dissection.  Residual 10-12 cm invasive lobular carcinoma on the right breast with 14/16+ nodes with extranodal extension.  Received adjuvant carboplatin and Navelbine chemotherapy x4 cycles  Attempted adjuvant radiation to the chest wall however interrupted due to cellulitis that required removal of implants in August 2004  Received tamoxifen from 6/22/2004 until June 2009.  Transitioned to Femara and received until June 2014  Identification of CT findings concerning for carcinomatosis on CT scans and June 2019.  PET scan confirmed hypermetabolic activity in the omentum as well as small retroperitoneal lymph nodes.  CT-guided omental biopsy 7/30/2019 with metastatic lobular carcinoma of breast primary, ER positive (greater than 95%), NH positive (90%), HER-2/tj negative (1+ IHC)  Foundation medicine liquid biopsy 2/5/2020: MSI undetermined, mutations in BETH, NF1, CHEK2.  No evidence of PIK3CA mutation.  Subsequent [facility] germline testing 11/12/2021 with BETH VUS (c.2864C>A) and POLE VUS (c.631A>T)  Initiation of Aromasin and Ibrance in August 2019  Difficulty with myelosuppression related to Ibrance requiring dose alterations, eventually changed to 100 mg for 7 days on followed by 7 days off.  CT chest abdomen pelvis 10/26/2021 with increase in left hydronephrosis with increase in left perinephric and periureteral stranding. Decrease in stone in the left kidney lower pole (7 mm).  Stable small retroperitoneal lymph and left periaortic lymph nodes.  PET scan 11/10/2021 with multiple bilateral hypermetabolic nodular pulmonary lesions, asymmetric moderate to severe left hydronephrosis with ill-defined fat stranding and soft tissue thickening in the renal sinus and surrounding the left ureter, hypermetabolic left retroperitoneal lymph node with slight increase in size,  ill-defined hypermetabolic thickening in the inferior omentum with increase in size, uptake and C7 (indeterminate, recommended MRI).  Short segments of uptake in the mid and distal esophagus possibly related to gastritis.  PET scan findings felt to be consistent with progressive malignancy.  Patient declined repeat omental biopsy.   Options for further treatment discussed on 11/12/2021.  In light of renal failure and dialysis, options are more limited.  Recommendation to continue Ibrance and discontinue Aromasin, begin Faslodex.  Discussed possible future use of single agent Taxol.    Aromasin discontinued 11/12/2021  On 11/22/2021 initiation of Faslodex with continuation of Ibrance (100 mg daily for 7 days on followed by 7 days off).  MRI cervical spine 11/18/2021 showed the right C7 normality to likely represent metastatic disease.  With solitary bone lesion, did not recommend pursuit of bone modifying agent.  Following 2 cycles Faslodex/Ibrance, PET scan on 1/11/2022 showed no change in the soft tissue superior to the dome of the bladder with hypermetabolic activity (likely related to carcinomatosis).  Significant decrease in injection of fat around the left renal pelvis and stable retroperitoneal hypermetabolic lymph nodes, decrease in size and activity in small bilateral pulmonary nodules.  Findings felt to represent evidence of response to treatment.    Ibrance held briefly beginning 1/28/2022 due to hospitalization with COVID-19 infection and C. difficile colitis.  Patient developed leukopenia/neutropenia.  Ibrance resumed at previous dosing (100 mg daily 7 days on followed by 7 days off) on 2/9/22.  Following 5 cycles Faslodex/Ibrance PET scan 4/19/2022 with evidence of mixed response.  New hypermetabolic lesion spinous process L2 (SUV 5.1) and slight increase in activity left clavicular metastasis (SUV increased 4.6-8.1).  C7 hypermetabolic mixed lytic and blastic bone lesion was unchanged.  Decrease in  uptake involving omental thickening.  Stable soft tissue thickening around the left renal pelvis and ureter.  Discussion again regarding potential pursuit of IV chemotherapy with Taxol versus continuation of Ibrance and Faslodex with radiation to sites of bony disease at L2, left clavicle, C7.  Patient opted to defer initiation of systemic chemotherapy and continue Ibrance/Faslodex with consideration of radiation.  Initiated monthly Xgeva on 5/19/2022 (cleared with nephrology).  Xgeva subsequently held due to significant hypocalcemia.  Decision made to forego further Xgeva with persistent hypocalcemia.  Palliative radiation received to lumbar spine regarding L2 metastasis 5/23- 6/7/2022  Ibrance held during radiation therapy beginning 5/22/2022.  Ibrance resumed 6/16/2022.  PET scan 7/21/2022 with stable hypermetabolic abdominal pelvic lymph nodes and subcarinal lymph node, stable bone lesions at C7, left clavicle.  Stable soft tissue thickening around the left renal pelvis with left hydronephrosis.  Uptake in adrenal glands felt to be likely reactive (no change in size).  No activity noted at L2 (previously radiated).  New hypermetabolic lesion right anterior sixth rib.  With evidence of further slight progression in bone on PET scan (new right sixth rib lesion), on 7/28/2022 discontinued Ibrance and plan to transition to abemaciclib with continuation of Faslodex.    On 8/4/2022 initiated abemaciclib at reduced dose of 50 mg twice daily.  On 8/25/2022, increased abemaciclib dose to 100 mg twice daily  PET scan 10/6/2022 with stable findings with exception of left acetabular activity report noted new activity however on prior PET scan question of focal activity present previously.  No corresponding CT abnormality and significant increase in SUV at sacral lesion.  Scan otherwise stable.  Decision to continue current treatment  Abemaciclib dose increased on 10/13/2022 up to 150 mg twice daily  PET scan 12/29/2022 with  artifactual accumulation of tracer right axilla and right mastectomy bed without visualized CT abnormality.  Hypermetabolic bone lesions with decrease in level of activity.  No new findings.  PET scan 3/30/2023 with minimal/insignificant increase in activity in multiple bone lesions.  1 new focus of hypermetabolic activity left anterior acetabulum (SUV 10.7) however previously identified activity anterolateral left acetabulum is no longer present.  Subcutaneous fluid collection lateral right anterior chest wall nearly completely resolved.  Due to continued clinical benefit from Faslodex/abemaciclib and with only 1 potential new finding in left acetabulum on PET scan, elected to continue current treatment at visit on 4/13/2023 and pursue MRI pelvis to evaluate left acetabulum with potential pursuit of radiation to solitary new left acetabular lesion.  Abemaciclib held 4/13/2023 due to neutropenia (ANC 0.95) and thrombocytopenia (platelet count 93,000).  On 4/27/2023 resumed abemaciclib 150 mg in the morning and subsequently added 100 mg dose in the evening on 5/6/2023.    Patient developed significant hematuria which had originally been interpreted as vaginal bleeding.  She then experienced migration and loss of her left ureteral stent spontaneously.  On 5/5/2023 she underwent replacement of left ureteral stent as well as cauterization of a bleeding site in the bladder.  She underwent at the same setting D&C with biopsy that showed no evidence of endometrium, minute fragment of benign endocervical mucosa.  Complete resolution of the hematuria.   MRI pelvis 5/8/2023 showed multiple areas of metastatic involvement in the pelvis, largest lesion 2.7 cm involving the anterior left acetabulum (corresponding to the new PET positive lesion).  No prior comparison.  Additional subtle 1 cm left iliac wing, 1.9 cm S2, 1.7 cm right iliac crest, 1.4 cm left pubic ramus, and 2.7 cm posterior right acetabular lesions noted.  There  were several other subcentimeter foci of abnormal marrow signal in the sacrum and right iliac bone of unclear significance.  Patient received palliative radiation to the new/progressive lesion in the left anterior acetabulum, received 20 Gray in 5 fractions completed on 5/22/2023.  Due to continued myelosuppression, then the cycle of dose decreased further from 150 mg a.m. and 100 mg p.m. down to 100 mg twice daily on 6/1/2023.  PET scan 7/3/2023 with stable bone lesions with minor variations in degree of hypermetabolic activity likely clinically insignificant.  Increase in moderate ascites with some associated hypermetabolic activity in the anterior pelvis.  Focal 1 cm hypermetabolic soft tissue nodule in the right mesentery with SUV 5.2.  Patient returns today in follow-up due for cycle 22 Faslodex 500 mg IM every 4 weeks and continuing on abemaciclib at reduced dose of 100 mg twice daily.  The patient is currently experiencing ongoing difficulty with diarrhea that has been intermittent for the past few months.  She also has had some degree of nausea recently.  I suspect some of her symptoms are related to abemaciclib.  There is question however with her history of C. difficile and current positive C. difficile PCR but negative C. difficile toxin as to whether there is some role from C. difficile infection as well.  We discussed a trial of oral vancomycin x 10 days to see if her diarrhea symptoms improve.  She is able to control the diarrhea reasonably well with Imodium 2/day and Lomotil 1/day.  She has developed a drop in her blood pressure recently, unclear whether this is related to her dialysis or in part related to diarrhea and fluid loss.  She is going to be notifying Dr. Antonio of her blood pressure issues to see if any adjustment needs to be made in her dialysis.  Currently, her counts are improved with WBC 3.92/ANC 2.46 and platelet count up to 174,000.  Hemoglobin today is 8.0.  We will continue on with  treatment as planned for now.  She will have CBC in 2 weeks with RN review and I will see her in 4 weeks when she is due for cycle 23 Faslodex.  We discussed at 3-month interval PET scan.    *Anemia secondary to ESRD, underlying malignancy/chronic disease, myelosuppression from CDK 4/6 inhibitor, GI blood loss:  Anemia secondary to ESRD, malignancy with exacerbation by use of Ibrance  Previous evidence of folate deficiency 8/12/2019 with level 3.84, initiated folic acid 1 mg daily  Previous evidence of iron deficiency, received Injectafer on 8/7/2020 750 mg IV x1 dose.  Second dose not administered due to onset of fever, subsequent iron studies precluded further administration of IV iron.  Previous low normal B12 level initiated oral B12 1000 mcg daily.  Patient had previously received Procrit and required intermittent transfusion support  Following initiation of hemodialysis, management of BEAU transitioned to nephrology, receiving Epogen with dialysis.  Ongoing transfusion support prompted alteration in Ibrance dosing on 8/23/2021.  After alteration in Ibrance dosing, hemoglobin improved and remained stable in the 9-10 range.  Improved but ongoing intermittent need for transfusion support despite Ibrance dose alteration  Stool negative for occult blood x3 on 11/2/2021  Patient with reduced dialysis frequency to 2 days/week with concurrent decrease in Epogen dosing corresponding again to increased transfusion requirement.  Epogen dose increased per nephrology to 20,000 units twice weekly with dialysis on Mondays and Fridays  Ibrance again exacerbating anemia with ongoing intermittent transfusion requirements.  Ibrance subsequently discontinued on 7/28/2022 and patient initiated abemaciclib on 8/4/2022 which may be less myelosuppressive.  Additional transfusions required with hemoglobin on 3/31/2022 6.6, hemoglobin 4/21/2022 of 6.4, hemoglobin on 5/5/2022 of 6.8, hemoglobin 6.4 on 7/14/2022, hemoglobin 6.4 on  7/28/2022, hemoglobin 6.8 on 8/18/2022.  Patient did develop transient visible blood in stool in July 2022  Anemia evaluation 7/28/2022 with iron 32, ferritin 412, iron saturation 15%, TIBC 210, folate 19.9, B12 925, haptoglobin 300, .  Stool positive for occult blood x3 on 8/1/2022  Patient was seen by GI on 8/16/2022, felt to be high risk for endoscopic evaluation and elected to forego EGD/colonoscopy for now  Patient transitioned from hemodialysis in clinic to home hemodialysis 5 days/week x 2 hours beginning 8/29/2022.  With transition to home dialysis, nephrology transition the patient from Epogen to Mircera administered every 4 weeks in their office, initiated 8/22/2022.  Symptomatic hemoglobin 7.3 on 2/16/2023, received transfusion  Additional labs on 3/16/2023 with B12 1146, folate greater than 20  Patient with significant gross hematuria with left ureteral stent migration and eventual loss.  Anemia worsened during this timeframe requiring transfusion support on 4/20/2023 with hemoglobin 6.1 and 5/3/2023 with hemoglobin 6.3.  Hemoglobin declined to 7.2 on 6/22/2023, transfuse 1 unit PRBC  Today, hemoglobin is acceptable at 8.0.  Patient continues with every 4-week Mircera with nephrology due next week.  We will check CBC with RN review in 2 weeks and I will see her in 4 weeks with plans to transfuse for hemoglobin less than 7.0 or for symptomatic hemoglobin in the 7-8 range.    *ESRD on HD:  Patient with previous CKD3/4  Hospitalization 4/29-5/4/2021 with RYAN/CKD, UTI, anemia.  Required initiation of hemodialysis Tuesday Thursday Saturday.   Decrease in frequency of dialysis to twice per week.  She continues to produce a significant amount of urine.   Status post left upper extremity AV fistula placement on 11/3/2021 by vascular surgery  Attempt to hold further dialysis on 1/11/2022 with close monitoring of her laboratory and clinical parameters.  Dialysis quickly resumed due to development of  hyperkalemia.  Patient was undergoing dialysis 2 days/week on Mondays and Fridays.    On 8/22/2022 patient transitioned to use of home dialysis device 5 days/week x 2 hours.  Patient continues on home hemodialysis 5 days/week.   Patient as above notes difficulty with recent hypotension, today 88/56 blood pressure.  She is notifying Dr. Antonio to see if any adjustments need to be made in her dialysis.    *CHF with severe aortic stenosis:  Echocardiogram 7/12/2019 showing ejection fraction 48% with moderate dilation of the LV cavity, global hypokinesis, grade 2 diastolic dysfunction, mild to moderate aortic stenosis, trivial pericardial effusion.  Echocardiogram 7/19/2021 with ejection fraction 56%, left atrial volume moderately increased, grade 2 diastolic dysfunction, severe calcification aortic valve, moderate aortic stenosis, severe mitral calcification, mild mitral stenosis, marked elevation in RVSP from tricuspid regurgitation.  Echocardiogram on 7/13/2022 with ejection fraction 56-60%, grade 2 diastolic dysfunction, severe aortic stenosis.    Cardiac catheterization 8/23/2022 showed normal coronary arteries, mild to moderate pulmonary hypertension.    She was evaluated by cardiothoracic surgery Dr. Pagan and there was discussion regarding potential candidacy for TAVR although there was concern given her underlying comorbidities including her metastatic breast cancer.  I discussed patient's prognosis with Dr. Pagan.  She does have opportunities for additional treatment of her malignancy with intravenous chemotherapy and is willing to pursue this in the future when needed.  It is unclear as to her expected survival however given her multiple severe comorbidities with metastatic breast cancer, ESRD on dialysis, severe aortic stenosis, severe anemia.  There is consideration of possible pursuit of balloon valvuloplasty initially to assess her symptomatic response and then consider pursuit of TAVR in the future.    Patient did undergo aortic valvuloplasty during admission 1/10 - 1/11/2023.    *Left upper and bilateral lower extremity peripheral neuropathy:  Patient reports that left upper extremity neuropathy was not present from previous chemotherapy but occurred after hospitalization that required intubation and critical care stay.  Apparently felt to represent critical care neuropathy.  Improved over time.  Bilateral lower extremity neuropathy felt to be related to diabetes  May have future implications regarding treatment for breast cancer.  Patient reports that peripheral neuropathy symptoms continue in the bilateral lower extremities, unchanged.  This will be a consideration with potential future use of Taxol    *Uterine/endometrial activity on PET scan:  Patient experienced postmenopausal vaginal bleeding in 2013, negative endometrial biopsy and gynecologic evaluation.  With findings on PET scan with enlarging uterus and some hypermetabolic activity, referred back to Dr. Oliveros in gynecology.    9/19/2019 D&C with hysteroscopy by Dr. Oliveros, no significant intraoperative findings, pathologic results with benign findings, no evidence of malignancy.  Patient with question of vaginal bleeding however subsequently became evident that this was hematuria.  She was referred to gynecology, did undergo on 5/5/2023 D&C at the same time as cystoscopy, biopsy with no evidence of malignancy but no endometrium present.    *Nausea:  Mild, relieved with Zofran.   Patient experienced improvement in nausea after initiating hemodialysis  Patient has experienced some recent nausea and a few episodes of emesis however this has subsided    *Myelosuppression secondary to CDK 4/6 inhibitor:  Patient was able to maintain adequate WBC after dosing alteration on Ibrance to treatment at 100 mg daily for 7 days on followed by 7 days off.  Subsequent transition from Ibrance to abemaciclib  Overall counts improved on abemaciclib  Patient with  decline in counts felt to be secondary to abemaciclib.  On 4/13/2023 abemaciclib held with WBC 1.82/ANC 0.95, platelet count 93,000. On 4/27/2023 resumed abemaciclib 150 mg in the morning and subsequently added 100 mg dose in the evening on 5/6/2023.  On 6/1/2023, further decrease in abemaciclib dose to 100 mg twice daily due to neutropenia and thrombocytopenia.  Today, WBC improved at 3.92 with ANC 2.46 and platelet count up to 174,000.    *Depression  Patient with history of depression, worsened after the death of her son in November 2021  With evidence of progressive malignancy in November 2021, patient agreeable to referral to supportive oncology  Patient currently receiving gabapentin 300 mg nightly, Zoloft 150 mg daily  Patient does continue with difficulties regarding depression that wax and wane.  Currently symptoms under fair control.  Patient was previously seeing supportive oncology.  She does not feel that she needs further follow-up at this time however would not rule out returning to see supportive oncology in the future if needed.  We will prescribe her Zoloft at this point.  She remains off of armodafinil.    *Left perinephric stranding secondary to malignancy with hydronephrosis:  CT 4/22/2021 showed interval enlargement of 2 staghorn calculi in the left kidney with persistent left perinephric stranding  Hospitalization 4/29-5/4/2021 with RYAN/CKD, UTI, anemia.  Required 2 unit PRBC transfusion and initiated hemodialysis Tuesday Thursday Saturday.    Discussion from urology regarding potential need for surgical procedure to address staghorn calculus left kidney  CT scan 7/2/2021 with only 1 remaining left renal stone identified which had decreased in size.  Patient appears to have passed the other stone.  As above, CT 10/26/2021 with more prominent left hydronephrosis and increase in left perinephric and periureteral stranding.  Felt to possibly be related to passage of recent stone versus partial  obstruction secondary to debris or thrombus in the left ureter versus pyelonephritis.  Patient however with no clinical evidence of recent stone passage nor pyelonephritis. Malignancy felt to be the cause of CT changes around the left kidney at this point.   Left ureteral stent replacement 12/16/2021  PET scan 1/11/2022 with decrease in fat injection near left renal pelvis, stent in satisfactory position.  Mild residual hydronephrosis on the left.  Ureteral stent replaced on 3/10/2022  PET scan 4/19/2022 with no hydronephrosis, left ureteral stent in place  PET scan 7/21/2022 with persistent left hydronephrosis, ureteral stent in place  PET scan 10/6/2022 with left nephroureteral stent in place, overall stable findings  PET scan 12/29/2022 with left nephroureteral stent remaining in place, stable findings  PET scan 3/30/2023 with left ureteral stent in place.  Patient developed gross hematuria with migration and spontaneous loss of left ureteral stent.  Patient underwent stent replacement on 5/5/2023.  Cauterization of bleeding focus in the bladder.  Resolution of hematuria.    *Right thyroid nodules  Patient underwent prior thyroid biopsy by her report with benign findings many years ago, records not available  On MRI cervical spine 11/18/2021, finding of 1.8 cm right thyroid nodule  Thyroid ultrasound 11/26/2021 with right-sided thyroid nodules, 1 nodule was hypoechoic, solid measuring 2 cm with biopsy recommended.  Thyroid ultrasound results reviewed with patient.  In light of her metastatic breast cancer, renal failure the significance of the thyroid nodule is felt to be much less.  We discussed possibility of thyroid biopsy however patient is inclined to forego this, especially since she has had a biopsy performed in the past with benign findings by her report.    No hypermetabolic activity identified on PET scan 1/11/2022 in the neck    *Hospitalization 1/28-1/30/2022 due to COVID-19 infection and C. difficile  colitis  Patient fully vaccinated with 3 doses Moderna COVID-19 vaccine  Patient developed symptoms 1/26/2022 with abrupt onset sinus congestion, mild cough, subsequently developed nausea and diarrhea on 1/27/2022.  Significant fatigue.  Patient tested positive in emergency department on 1/28/2022 and was admitted  Patient maintained O2 saturation in mid 90% range on room air  Patient received remdesivir  Patient was also found to be positive for C. difficile colitis, received course of oral vancomycin.  Symptoms from both COVID-19 and C. difficile colitis ultimately resolved.    *Hypocalcemia  Patient developed significant hypocalcemia after receiving Xgeva on 5/19/2022  Calcium management per nephrology  No further Xgeva planned due to persistent significant hypocalcemia  Calcium today 9.0    *Diarrhea  Patient with history of C. difficile infection in January 2022  Patient with intermittent diarrhea since around June 2023.  Suspect that ongoing intermittent diarrhea is related to abemaciclib primarily  Patient does use Imodium and Lomotil as needed for diarrhea  We did check stool studies on 7/31/2023 with negative GI PCR however C. difficile testing showed negative C. difficile toxin but positive C. difficile PCR.  Typically this indicates a carrier status.  Given the patient's ongoing diarrhea however it is unclear to what extent infection may be playing a role.  We discussed a trial of oral vancomycin 125 mg 4 times daily x 10 days and prescription was sent today.  She will notify us if in regards to her symptoms.  Will also recheck thyroid function studies today.        Plan:  Patient will proceed with Faslodex 500 mg IM injection today and continue every 28 days (today is cycle 22)  Continue abemaciclib 100 mg twice daily  Add TSH and free T4 to today's labs  Prescription sent for oral vancomycin 125 mg 4 times daily x 10 days  Patient is going to notify Dr. Antonio regarding her recent low blood pressure  readings and potential need for adjustment of her dialysis regimen at home.  Patient is receiving Mircera under the direction of nephrology, dosing every 4 weeks (due next week) as well as IV iron intermittently as needed.  Continue to monitor hemoglobin closely regarding ongoing transfusion needs for hemoglobin less than 7.0.   In 2 weeks CBC and RN review  In 4 weeks MD visit with CBC, CMP and patient will be due for cycle 23 Faslodex with continuation of abemaciclib.  Plan repeat PET scan, at a 3-month interval from most recent study pending her clinical course.    Patient continues on high risk medication requiring intensive monitoring.    I did spend 40 minutes in total time caring for the patient today, time spent in review of records, face-to-face consultation, coordination of care, placement of orders, completion of documentation.

## 2023-08-09 ENCOUNTER — INFUSION (OUTPATIENT)
Dept: ONCOLOGY | Facility: HOSPITAL | Age: 63
End: 2023-08-09
Payer: COMMERCIAL

## 2023-08-09 ENCOUNTER — OFFICE VISIT (OUTPATIENT)
Dept: ONCOLOGY | Facility: CLINIC | Age: 63
End: 2023-08-09
Payer: COMMERCIAL

## 2023-08-09 ENCOUNTER — LAB (OUTPATIENT)
Dept: LAB | Facility: HOSPITAL | Age: 63
End: 2023-08-09
Payer: COMMERCIAL

## 2023-08-09 VITALS
SYSTOLIC BLOOD PRESSURE: 88 MMHG | HEART RATE: 76 BPM | RESPIRATION RATE: 18 BRPM | OXYGEN SATURATION: 94 % | DIASTOLIC BLOOD PRESSURE: 56 MMHG | TEMPERATURE: 98.4 F

## 2023-08-09 DIAGNOSIS — C50.919 PRIMARY MALIGNANT NEOPLASM OF BREAST WITH METASTASIS: Primary | ICD-10-CM

## 2023-08-09 DIAGNOSIS — E03.9 ACQUIRED HYPOTHYROIDISM: ICD-10-CM

## 2023-08-09 DIAGNOSIS — C50.919 PRIMARY MALIGNANT NEOPLASM OF BREAST WITH METASTASIS: ICD-10-CM

## 2023-08-09 LAB
ALBUMIN SERPL-MCNC: 2.9 G/DL (ref 3.5–5.2)
ALBUMIN/GLOB SERPL: 0.9 G/DL
ALP SERPL-CCNC: 97 U/L (ref 39–117)
ALT SERPL W P-5'-P-CCNC: 7 U/L (ref 1–33)
ANION GAP SERPL CALCULATED.3IONS-SCNC: 18.5 MMOL/L (ref 5–15)
AST SERPL-CCNC: 25 U/L (ref 1–32)
BASOPHILS # BLD AUTO: 0.08 10*3/MM3 (ref 0–0.2)
BASOPHILS NFR BLD AUTO: 2 % (ref 0–1.5)
BILIRUB SERPL-MCNC: 0.5 MG/DL (ref 0–1.2)
BUN SERPL-MCNC: 45 MG/DL (ref 8–23)
BUN/CREAT SERPL: 7.3 (ref 7–25)
CALCIUM SPEC-SCNC: 8.3 MG/DL (ref 8.6–10.5)
CHLORIDE SERPL-SCNC: 95 MMOL/L (ref 98–107)
CO2 SERPL-SCNC: 20.5 MMOL/L (ref 22–29)
CREAT SERPL-MCNC: 6.19 MG/DL (ref 0.6–1.1)
DEPRECATED RDW RBC AUTO: 62.8 FL (ref 37–54)
EGFRCR SERPLBLD CKD-EPI 2021: 7.1 ML/MIN/1.73
EOSINOPHIL # BLD AUTO: 0.25 10*3/MM3 (ref 0–0.4)
EOSINOPHIL NFR BLD AUTO: 6.4 % (ref 0.3–6.2)
ERYTHROCYTE [DISTWIDTH] IN BLOOD BY AUTOMATED COUNT: 16.1 % (ref 12.3–15.4)
GLOBULIN UR ELPH-MCNC: 3.4 GM/DL
GLUCOSE SERPL-MCNC: 167 MG/DL (ref 65–99)
HCT VFR BLD AUTO: 25.2 % (ref 34–46.6)
HGB BLD-MCNC: 8 G/DL (ref 12–15.9)
IMM GRANULOCYTES # BLD AUTO: 0.02 10*3/MM3 (ref 0–0.05)
IMM GRANULOCYTES NFR BLD AUTO: 0.5 % (ref 0–0.5)
LYMPHOCYTES # BLD AUTO: 0.79 10*3/MM3 (ref 0.7–3.1)
LYMPHOCYTES NFR BLD AUTO: 20.2 % (ref 19.6–45.3)
MCH RBC QN AUTO: 33.8 PG (ref 26.6–33)
MCHC RBC AUTO-ENTMCNC: 31.7 G/DL (ref 31.5–35.7)
MCV RBC AUTO: 106.3 FL (ref 79–97)
MONOCYTES # BLD AUTO: 0.32 10*3/MM3 (ref 0.1–0.9)
MONOCYTES NFR BLD AUTO: 8.2 % (ref 5–12)
NEUTROPHILS NFR BLD AUTO: 2.46 10*3/MM3 (ref 1.7–7)
NEUTROPHILS NFR BLD AUTO: 62.7 % (ref 42.7–76)
NRBC BLD AUTO-RTO: 0 /100 WBC (ref 0–0.2)
PLATELET # BLD AUTO: 174 10*3/MM3 (ref 140–450)
PMV BLD AUTO: 9.5 FL (ref 6–12)
POTASSIUM SERPL-SCNC: 3.2 MMOL/L (ref 3.5–5.2)
PROT SERPL-MCNC: 6.3 G/DL (ref 6–8.5)
RBC # BLD AUTO: 2.37 10*6/MM3 (ref 3.77–5.28)
SODIUM SERPL-SCNC: 134 MMOL/L (ref 136–145)
T4 FREE SERPL-MCNC: 0.91 NG/DL (ref 0.93–1.7)
TSH SERPL DL<=0.05 MIU/L-ACNC: 3.65 UIU/ML (ref 0.27–4.2)
WBC NRBC COR # BLD: 3.92 10*3/MM3 (ref 3.4–10.8)

## 2023-08-09 PROCEDURE — 84439 ASSAY OF FREE THYROXINE: CPT | Performed by: INTERNAL MEDICINE

## 2023-08-09 PROCEDURE — 36415 COLL VENOUS BLD VENIPUNCTURE: CPT

## 2023-08-09 PROCEDURE — 25010000002 FULVESTRANT PER 25 MG: Performed by: INTERNAL MEDICINE

## 2023-08-09 PROCEDURE — 80050 GENERAL HEALTH PANEL: CPT

## 2023-08-09 PROCEDURE — 96402 CHEMO HORMON ANTINEOPL SQ/IM: CPT

## 2023-08-09 RX ORDER — LAMOTRIGINE 25 MG/1
500 TABLET ORAL ONCE
Status: COMPLETED | OUTPATIENT
Start: 2023-08-09 | End: 2023-08-09

## 2023-08-09 RX ORDER — LAMOTRIGINE 25 MG/1
500 TABLET ORAL ONCE
Status: CANCELLED
Start: 2023-08-09 | End: 2023-08-09

## 2023-08-09 RX ORDER — VANCOMYCIN HYDROCHLORIDE 125 MG/1
125 CAPSULE ORAL 4 TIMES DAILY
Qty: 40 CAPSULE | Refills: 0 | Status: SHIPPED | OUTPATIENT
Start: 2023-08-09

## 2023-08-09 RX ADMIN — FULVESTRANT 500 MG: 50 INJECTION INTRAMUSCULAR at 14:39

## 2023-08-09 NOTE — LETTER
August 9, 2023     Kiana Maradiaga) BETY Noriega  [address]  [address]  [facility] [address]    Patient: Juana Hall   YOB: 1960   Date of Visit: 8/9/2023     Dear Kiana Maradiaga) BETY Noriega:       Thank you for referring Juana Hall to me for evaluation. Below are the relevant portions of my assessment and plan of care.    If you have questions, please do not hesitate to call me. I look forward to following Juana along with you.         Sincerely,        Leodan Irvin MD        CC: MD Albaro Acosta, Leodan PETTIT Jr., MD  08/09/23 [id number]  Sign when Signing Visit  Chief Complaint  Metastatic lobular breast cancer (ER/NV positive, HER-2/tj negative), anemia secondary to CKD3, CHF, peripheral neuropathy, chemotherapy related nausea chemotherapy-induced myelosuppression    Subjective        History of Present Illness  The patient returns today in follow-up with laboratory studies to review continuing on monthly Faslodex due for cycle 22 today and continuing on abemaciclib.  She has experienced difficulty with myelosuppression from abemaciclib and has required dose reduction, currently receiving 100 mg twice daily dosing.  She does continue on home dialysis 5 days/week and reports that this has been going very well.  She does continue on monthly Mircera and IV iron with nephrology, due to receive next dosing next week.    Over the past month, the patient has had a number of issues.  She developed a UTI and received a course of Bactrim x 3 days from Dr. Barone with urine culture on 7/19/2023 that grew E. coli greater than 100,000 colonies.  She had notified our office of significant diarrhea that has been going on intermittently for a month or so but was unresponsive to Imodium.  It was felt that this likely was related to abemaciclib and we did prescribe Lomotil for her to use as well.  We did request that she return a stool specimen for C. difficile and GI PCR analysis.  She  did not bring in the specimen until 7/31/2023.  GI PCR was negative and C. difficile toxin was negative however C. difficile PCR was positive (history of C. difficile colitis).  The diarrhea had improved and therefore we did not pursue treatment for C. difficile given the pattern of testing.  Patient today however reports that the diarrhea has persisted.  She requires at least 2 Imodium per day and 1 Lomotil per day to control symptoms.  She has had some nausea and a few episodes of emesis as well.  She also notes that her blood pressure has been low recently, has been in the 90/40 range and she has been mildly symptomatic.  Today in the office she is 88/56.  She has not notified her nephrologist.      Objective   Vital Signs:   BP (!) 88/56   Pulse 76   Temp 98.4 øF (36.9 øC) (Temporal)   Resp 18   SpO2 94%     Physical Exam  Constitutional:       Appearance: She is well-developed. She is obese.      Comments: Patient is in a motorized scooter today   Eyes:      Conjunctiva/sclera: Conjunctivae normal.   Neck:      Thyroid: No thyromegaly.   Cardiovascular:      Rate and Rhythm: Normal rate and regular rhythm.      Heart sounds: Murmur heard.     No friction rub. No gallop.      Comments: 2/6 murmur  Pulmonary:      Effort: No respiratory distress.      Breath sounds: Normal breath sounds.   Abdominal:      General: Bowel sounds are normal. There is no distension.      Palpations: Abdomen is soft.      Tenderness: There is no abdominal tenderness.   Musculoskeletal:         General: Swelling present.      Comments: 1+ bilateral lower extremity edema with venous stasis changes noted.  Left upper extremity fistula   Lymphadenopathy:      Head:      Right side of head: No submandibular adenopathy.      Cervical: No cervical adenopathy.      Upper Body:      Right upper body: No supraclavicular adenopathy.      Left upper body: No supraclavicular adenopathy.   Skin:     General: Skin is warm and dry.      Findings:  No bruising or rash.   Neurological:      Mental Status: She is alert and oriented to person, place, and time.      Cranial Nerves: No cranial nerve deficit.      Motor: No abnormal muscle tone.      Deep Tendon Reflexes: Reflexes normal.   Psychiatric:         Behavior: Behavior normal.      Patient was examined today, unchanged from above    Result Review : Reviewed CBC, CMP from today.  Reviewed stool studies from 7/31/2023 and urine culture from 7/19/2023.  Reviewed PCP records.       Assessment and Plan    *Metastatic lobular breast cancer (ER/AR positive, HER-2/tj negative):  Previous stage IIIC right breast cancer in 2003, received neoadjuvant chemotherapy (unknown regimen) with subsequent bilateral mastectomies 1/22/2004 with right axillary dissection.  Residual 10-12 cm invasive lobular carcinoma on the right breast with 14/16+ nodes with extranodal extension.  Received adjuvant carboplatin and Navelbine chemotherapy x4 cycles  Attempted adjuvant radiation to the chest wall however interrupted due to cellulitis that required removal of implants in August 2004  Received tamoxifen from 6/22/2004 until June 2009.  Transitioned to Femara and received until June 2014  Identification of CT findings concerning for carcinomatosis on CT scans and June 2019.  PET scan confirmed hypermetabolic activity in the omentum as well as small retroperitoneal lymph nodes.  CT-guided omental biopsy 7/30/2019 with metastatic lobular carcinoma of breast primary, ER positive (greater than 95%), AR positive (90%), HER-2/tj negative (1+ IHC)  Foundation medicine liquid biopsy 2/5/2020: MSI undetermined, mutations in BETH, NF1, CHEK2.  No evidence of PIK3CA mutation.  Subsequent [facility] germline testing 11/12/2021 with BETH VUS (c.2864C>A) and POLE VUS (c.631A>T)  Initiation of Aromasin and Ibrance in August 2019  Difficulty with myelosuppression related to Ibrance requiring dose alterations, eventually changed to 100 mg for 7 days on  followed by 7 days off.  CT chest abdomen pelvis 10/26/2021 with increase in left hydronephrosis with increase in left perinephric and periureteral stranding. Decrease in stone in the left kidney lower pole (7 mm).  Stable small retroperitoneal lymph and left periaortic lymph nodes.  PET scan 11/10/2021 with multiple bilateral hypermetabolic nodular pulmonary lesions, asymmetric moderate to severe left hydronephrosis with ill-defined fat stranding and soft tissue thickening in the renal sinus and surrounding the left ureter, hypermetabolic left retroperitoneal lymph node with slight increase in size, ill-defined hypermetabolic thickening in the inferior omentum with increase in size, uptake and C7 (indeterminate, recommended MRI).  Short segments of uptake in the mid and distal esophagus possibly related to gastritis.  PET scan findings felt to be consistent with progressive malignancy.  Patient declined repeat omental biopsy.   Options for further treatment discussed on 11/12/2021.  In light of renal failure and dialysis, options are more limited.  Recommendation to continue Ibrance and discontinue Aromasin, begin Faslodex.  Discussed possible future use of single agent Taxol.    Aromasin discontinued 11/12/2021  On 11/22/2021 initiation of Faslodex with continuation of Ibrance (100 mg daily for 7 days on followed by 7 days off).  MRI cervical spine 11/18/2021 showed the right C7 normality to likely represent metastatic disease.  With solitary bone lesion, did not recommend pursuit of bone modifying agent.  Following 2 cycles Faslodex/Ibrance, PET scan on 1/11/2022 showed no change in the soft tissue superior to the dome of the bladder with hypermetabolic activity (likely related to carcinomatosis).  Significant decrease in injection of fat around the left renal pelvis and stable retroperitoneal hypermetabolic lymph nodes, decrease in size and activity in small bilateral pulmonary nodules.  Findings felt to  represent evidence of response to treatment.    Ibrance held briefly beginning 1/28/2022 due to hospitalization with COVID-19 infection and C. difficile colitis.  Patient developed leukopenia/neutropenia.  Ibrance resumed at previous dosing (100 mg daily 7 days on followed by 7 days off) on 2/9/22.  Following 5 cycles Faslodex/Ibrance PET scan 4/19/2022 with evidence of mixed response.  New hypermetabolic lesion spinous process L2 (SUV 5.1) and slight increase in activity left clavicular metastasis (SUV increased 4.6-8.1).  C7 hypermetabolic mixed lytic and blastic bone lesion was unchanged.  Decrease in uptake involving omental thickening.  Stable soft tissue thickening around the left renal pelvis and ureter.  Discussion again regarding potential pursuit of IV chemotherapy with Taxol versus continuation of Ibrance and Faslodex with radiation to sites of bony disease at L2, left clavicle, C7.  Patient opted to defer initiation of systemic chemotherapy and continue Ibrance/Faslodex with consideration of radiation.  Initiated monthly Xgeva on 5/19/2022 (cleared with nephrology).  Xgeva subsequently held due to significant hypocalcemia.  Decision made to forego further Xgeva with persistent hypocalcemia.  Palliative radiation received to lumbar spine regarding L2 metastasis 5/23- 6/7/2022  Ibrance held during radiation therapy beginning 5/22/2022.  Ibrance resumed 6/16/2022.  PET scan 7/21/2022 with stable hypermetabolic abdominal pelvic lymph nodes and subcarinal lymph node, stable bone lesions at C7, left clavicle.  Stable soft tissue thickening around the left renal pelvis with left hydronephrosis.  Uptake in adrenal glands felt to be likely reactive (no change in size).  No activity noted at L2 (previously radiated).  New hypermetabolic lesion right anterior sixth rib.  With evidence of further slight progression in bone on PET scan (new right sixth rib lesion), on 7/28/2022 discontinued Ibrance and plan to  transition to abemaciclib with continuation of Faslodex.    On 8/4/2022 initiated abemaciclib at reduced dose of 50 mg twice daily.  On 8/25/2022, increased abemaciclib dose to 100 mg twice daily  PET scan 10/6/2022 with stable findings with exception of left acetabular activity report noted new activity however on prior PET scan question of focal activity present previously.  No corresponding CT abnormality and significant increase in SUV at sacral lesion.  Scan otherwise stable.  Decision to continue current treatment  Abemaciclib dose increased on 10/13/2022 up to 150 mg twice daily  PET scan 12/29/2022 with artifactual accumulation of tracer right axilla and right mastectomy bed without visualized CT abnormality.  Hypermetabolic bone lesions with decrease in level of activity.  No new findings.  PET scan 3/30/2023 with minimal/insignificant increase in activity in multiple bone lesions.  1 new focus of hypermetabolic activity left anterior acetabulum (SUV 10.7) however previously identified activity anterolateral left acetabulum is no longer present.  Subcutaneous fluid collection lateral right anterior chest wall nearly completely resolved.  Due to continued clinical benefit from Faslodex/abemaciclib and with only 1 potential new finding in left acetabulum on PET scan, elected to continue current treatment at visit on 4/13/2023 and pursue MRI pelvis to evaluate left acetabulum with potential pursuit of radiation to solitary new left acetabular lesion.  Abemaciclib held 4/13/2023 due to neutropenia (ANC 0.95) and thrombocytopenia (platelet count 93,000).  On 4/27/2023 resumed abemaciclib 150 mg in the morning and subsequently added 100 mg dose in the evening on 5/6/2023.    Patient developed significant hematuria which had originally been interpreted as vaginal bleeding.  She then experienced migration and loss of her left ureteral stent spontaneously.  On 5/5/2023 she underwent replacement of left ureteral  stent as well as cauterization of a bleeding site in the bladder.  She underwent at the same setting D&C with biopsy that showed no evidence of endometrium, minute fragment of benign endocervical mucosa.  Complete resolution of the hematuria.   MRI pelvis 5/8/2023 showed multiple areas of metastatic involvement in the pelvis, largest lesion 2.7 cm involving the anterior left acetabulum (corresponding to the new PET positive lesion).  No prior comparison.  Additional subtle 1 cm left iliac wing, 1.9 cm S2, 1.7 cm right iliac crest, 1.4 cm left pubic ramus, and 2.7 cm posterior right acetabular lesions noted.  There were several other subcentimeter foci of abnormal marrow signal in the sacrum and right iliac bone of unclear significance.  Patient received palliative radiation to the new/progressive lesion in the left anterior acetabulum, received 20 Gray in 5 fractions completed on 5/22/2023.  Due to continued myelosuppression, then the cycle of dose decreased further from 150 mg a.m. and 100 mg p.m. down to 100 mg twice daily on 6/1/2023.  PET scan 7/3/2023 with stable bone lesions with minor variations in degree of hypermetabolic activity likely clinically insignificant.  Increase in moderate ascites with some associated hypermetabolic activity in the anterior pelvis.  Focal 1 cm hypermetabolic soft tissue nodule in the right mesentery with SUV 5.2.  Patient returns today in follow-up due for cycle 22 Faslodex 500 mg IM every 4 weeks and continuing on abemaciclib at reduced dose of 100 mg twice daily.  The patient is currently experiencing ongoing difficulty with diarrhea that has been intermittent for the past few months.  She also has had some degree of nausea recently.  I suspect some of her symptoms are related to abemaciclib.  There is question however with her history of C. difficile and current positive C. difficile PCR but negative C. difficile toxin as to whether there is some role from C. difficile  infection as well.  We discussed a trial of oral vancomycin x 10 days to see if her diarrhea symptoms improve.  She is able to control the diarrhea reasonably well with Imodium 2/day and Lomotil 1/day.  She has developed a drop in her blood pressure recently, unclear whether this is related to her dialysis or in part related to diarrhea and fluid loss.  She is going to be notifying Dr. Antonio of her blood pressure issues to see if any adjustment needs to be made in her dialysis.  Currently, her counts are improved with WBC 3.92/ANC 2.46 and platelet count up to 174,000.  Hemoglobin today is 8.0.  We will continue on with treatment as planned for now.  She will have CBC in 2 weeks with RN review and I will see her in 4 weeks when she is due for cycle 23 Faslodex.  We discussed at 3-month interval PET scan.    *Anemia secondary to ESRD, underlying malignancy/chronic disease, myelosuppression from CDK 4/6 inhibitor, GI blood loss:  Anemia secondary to ESRD, malignancy with exacerbation by use of Ibrance  Previous evidence of folate deficiency 8/12/2019 with level 3.84, initiated folic acid 1 mg daily  Previous evidence of iron deficiency, received Injectafer on 8/7/2020 750 mg IV x1 dose.  Second dose not administered due to onset of fever, subsequent iron studies precluded further administration of IV iron.  Previous low normal B12 level initiated oral B12 1000 mcg daily.  Patient had previously received Procrit and required intermittent transfusion support  Following initiation of hemodialysis, management of BEAU transitioned to nephrology, receiving Epogen with dialysis.  Ongoing transfusion support prompted alteration in Ibrance dosing on 8/23/2021.  After alteration in Ibrance dosing, hemoglobin improved and remained stable in the 9-10 range.  Improved but ongoing intermittent need for transfusion support despite Ibrance dose alteration  Stool negative for occult blood x3 on 11/2/2021  Patient with reduced dialysis  frequency to 2 days/week with concurrent decrease in Epogen dosing corresponding again to increased transfusion requirement.  Epogen dose increased per nephrology to 20,000 units twice weekly with dialysis on Mondays and Fridays  Ibrance again exacerbating anemia with ongoing intermittent transfusion requirements.  Ibrance subsequently discontinued on 7/28/2022 and patient initiated abemaciclib on 8/4/2022 which may be less myelosuppressive.  Additional transfusions required with hemoglobin on 3/31/2022 6.6, hemoglobin 4/21/2022 of 6.4, hemoglobin on 5/5/2022 of 6.8, hemoglobin 6.4 on 7/14/2022, hemoglobin 6.4 on 7/28/2022, hemoglobin 6.8 on 8/18/2022.  Patient did develop transient visible blood in stool in July 2022  Anemia evaluation 7/28/2022 with iron 32, ferritin 412, iron saturation 15%, TIBC 210, folate 19.9, B12 925, haptoglobin 300, .  Stool positive for occult blood x3 on 8/1/2022  Patient was seen by GI on 8/16/2022, felt to be high risk for endoscopic evaluation and elected to forego EGD/colonoscopy for now  Patient transitioned from hemodialysis in clinic to home hemodialysis 5 days/week x 2 hours beginning 8/29/2022.  With transition to home dialysis, nephrology transition the patient from Epogen to Mircera administered every 4 weeks in their office, initiated 8/22/2022.  Symptomatic hemoglobin 7.3 on 2/16/2023, received transfusion  Additional labs on 3/16/2023 with B12 1146, folate greater than 20  Patient with significant gross hematuria with left ureteral stent migration and eventual loss.  Anemia worsened during this timeframe requiring transfusion support on 4/20/2023 with hemoglobin 6.1 and 5/3/2023 with hemoglobin 6.3.  Hemoglobin declined to 7.2 on 6/22/2023, transfuse 1 unit PRBC  Today, hemoglobin is acceptable at 8.0.  Patient continues with every 4-week Mircera with nephrology due next week.  We will check CBC with RN review in 2 weeks and I will see her in 4 weeks with plans to  transfuse for hemoglobin less than 7.0 or for symptomatic hemoglobin in the 7-8 range.    *ESRD on HD:  Patient with previous CKD3/4  Hospitalization 4/29-5/4/2021 with RYAN/CKD, UTI, anemia.  Required initiation of hemodialysis Tuesday Thursday Saturday.   Decrease in frequency of dialysis to twice per week.  She continues to produce a significant amount of urine.   Status post left upper extremity AV fistula placement on 11/3/2021 by vascular surgery  Attempt to hold further dialysis on 1/11/2022 with close monitoring of her laboratory and clinical parameters.  Dialysis quickly resumed due to development of hyperkalemia.  Patient was undergoing dialysis 2 days/week on Mondays and Fridays.    On 8/22/2022 patient transitioned to use of home dialysis device 5 days/week x 2 hours.  Patient continues on home hemodialysis 5 days/week.   Patient as above notes difficulty with recent hypotension, today 88/56 blood pressure.  She is notifying Dr. Antonio to see if any adjustments need to be made in her dialysis.    *CHF with severe aortic stenosis:  Echocardiogram 7/12/2019 showing ejection fraction 48% with moderate dilation of the LV cavity, global hypokinesis, grade 2 diastolic dysfunction, mild to moderate aortic stenosis, trivial pericardial effusion.  Echocardiogram 7/19/2021 with ejection fraction 56%, left atrial volume moderately increased, grade 2 diastolic dysfunction, severe calcification aortic valve, moderate aortic stenosis, severe mitral calcification, mild mitral stenosis, marked elevation in RVSP from tricuspid regurgitation.  Echocardiogram on 7/13/2022 with ejection fraction 56-60%, grade 2 diastolic dysfunction, severe aortic stenosis.    Cardiac catheterization 8/23/2022 showed normal coronary arteries, mild to moderate pulmonary hypertension.    She was evaluated by cardiothoracic surgery Dr. Pagan and there was discussion regarding potential candidacy for TAVR although there was concern given her  underlying comorbidities including her metastatic breast cancer.  I discussed patient's prognosis with Dr. Pagan.  She does have opportunities for additional treatment of her malignancy with intravenous chemotherapy and is willing to pursue this in the future when needed.  It is unclear as to her expected survival however given her multiple severe comorbidities with metastatic breast cancer, ESRD on dialysis, severe aortic stenosis, severe anemia.  There is consideration of possible pursuit of balloon valvuloplasty initially to assess her symptomatic response and then consider pursuit of TAVR in the future.   Patient did undergo aortic valvuloplasty during admission 1/10 - 1/11/2023.    *Left upper and bilateral lower extremity peripheral neuropathy:  Patient reports that left upper extremity neuropathy was not present from previous chemotherapy but occurred after hospitalization that required intubation and critical care stay.  Apparently felt to represent critical care neuropathy.  Improved over time.  Bilateral lower extremity neuropathy felt to be related to diabetes  May have future implications regarding treatment for breast cancer.  Patient reports that peripheral neuropathy symptoms continue in the bilateral lower extremities, unchanged.  This will be a consideration with potential future use of Taxol    *Uterine/endometrial activity on PET scan:  Patient experienced postmenopausal vaginal bleeding in 2013, negative endometrial biopsy and gynecologic evaluation.  With findings on PET scan with enlarging uterus and some hypermetabolic activity, referred back to Dr. Oliveros in gynecology.    9/19/2019 D&C with hysteroscopy by Dr. Oliveros, no significant intraoperative findings, pathologic results with benign findings, no evidence of malignancy.  Patient with question of vaginal bleeding however subsequently became evident that this was hematuria.  She was referred to gynecology, did undergo on 5/5/2023 D&C at the  same time as cystoscopy, biopsy with no evidence of malignancy but no endometrium present.    *Nausea:  Mild, relieved with Zofran.   Patient experienced improvement in nausea after initiating hemodialysis  Patient has experienced some recent nausea and a few episodes of emesis however this has subsided    *Myelosuppression secondary to CDK 4/6 inhibitor:  Patient was able to maintain adequate WBC after dosing alteration on Ibrance to treatment at 100 mg daily for 7 days on followed by 7 days off.  Subsequent transition from Ibrance to abemaciclib  Overall counts improved on abemaciclib  Patient with decline in counts felt to be secondary to abemaciclib.  On 4/13/2023 abemaciclib held with WBC 1.82/ANC 0.95, platelet count 93,000. On 4/27/2023 resumed abemaciclib 150 mg in the morning and subsequently added 100 mg dose in the evening on 5/6/2023.  On 6/1/2023, further decrease in abemaciclib dose to 100 mg twice daily due to neutropenia and thrombocytopenia.  Today, WBC improved at 3.92 with ANC 2.46 and platelet count up to 174,000.    *Depression  Patient with history of depression, worsened after the death of her son in November 2021  With evidence of progressive malignancy in November 2021, patient agreeable to referral to supportive oncology  Patient currently receiving gabapentin 300 mg nightly, Zoloft 150 mg daily  Patient does continue with difficulties regarding depression that wax and wane.  Currently symptoms under fair control.  Patient was previously seeing supportive oncology.  She does not feel that she needs further follow-up at this time however would not rule out returning to see supportive oncology in the future if needed.  We will prescribe her Zoloft at this point.  She remains off of armodafinil.    *Left perinephric stranding secondary to malignancy with hydronephrosis:  CT 4/22/2021 showed interval enlargement of 2 staghorn calculi in the left kidney with persistent left perinephric  stranding  Hospitalization 4/29-5/4/2021 with RYAN/CKD, UTI, anemia.  Required 2 unit PRBC transfusion and initiated hemodialysis Tuesday Thursday Saturday.    Discussion from urology regarding potential need for surgical procedure to address staghorn calculus left kidney  CT scan 7/2/2021 with only 1 remaining left renal stone identified which had decreased in size.  Patient appears to have passed the other stone.  As above, CT 10/26/2021 with more prominent left hydronephrosis and increase in left perinephric and periureteral stranding.  Felt to possibly be related to passage of recent stone versus partial obstruction secondary to debris or thrombus in the left ureter versus pyelonephritis.  Patient however with no clinical evidence of recent stone passage nor pyelonephritis. Malignancy felt to be the cause of CT changes around the left kidney at this point.   Left ureteral stent replacement 12/16/2021  PET scan 1/11/2022 with decrease in fat injection near left renal pelvis, stent in satisfactory position.  Mild residual hydronephrosis on the left.  Ureteral stent replaced on 3/10/2022  PET scan 4/19/2022 with no hydronephrosis, left ureteral stent in place  PET scan 7/21/2022 with persistent left hydronephrosis, ureteral stent in place  PET scan 10/6/2022 with left nephroureteral stent in place, overall stable findings  PET scan 12/29/2022 with left nephroureteral stent remaining in place, stable findings  PET scan 3/30/2023 with left ureteral stent in place.  Patient developed gross hematuria with migration and spontaneous loss of left ureteral stent.  Patient underwent stent replacement on 5/5/2023.  Cauterization of bleeding focus in the bladder.  Resolution of hematuria.    *Right thyroid nodules  Patient underwent prior thyroid biopsy by her report with benign findings many years ago, records not available  On MRI cervical spine 11/18/2021, finding of 1.8 cm right thyroid nodule  Thyroid ultrasound  11/26/2021 with right-sided thyroid nodules, 1 nodule was hypoechoic, solid measuring 2 cm with biopsy recommended.  Thyroid ultrasound results reviewed with patient.  In light of her metastatic breast cancer, renal failure the significance of the thyroid nodule is felt to be much less.  We discussed possibility of thyroid biopsy however patient is inclined to forego this, especially since she has had a biopsy performed in the past with benign findings by her report.    No hypermetabolic activity identified on PET scan 1/11/2022 in the neck    *Hospitalization 1/28-1/30/2022 due to COVID-19 infection and C. difficile colitis  Patient fully vaccinated with 3 doses Moderna COVID-19 vaccine  Patient developed symptoms 1/26/2022 with abrupt onset sinus congestion, mild cough, subsequently developed nausea and diarrhea on 1/27/2022.  Significant fatigue.  Patient tested positive in emergency department on 1/28/2022 and was admitted  Patient maintained O2 saturation in mid 90% range on room air  Patient received remdesivir  Patient was also found to be positive for C. difficile colitis, received course of oral vancomycin.  Symptoms from both COVID-19 and C. difficile colitis ultimately resolved.    *Hypocalcemia  Patient developed significant hypocalcemia after receiving Xgeva on 5/19/2022  Calcium management per nephrology  No further Xgeva planned due to persistent significant hypocalcemia  Calcium today 9.0    *Diarrhea  Patient with history of C. difficile infection in January 2022  Patient with intermittent diarrhea since around June 2023.  Suspect that ongoing intermittent diarrhea is related to abemaciclib primarily  Patient does use Imodium and Lomotil as needed for diarrhea  We did check stool studies on 7/31/2023 with negative GI PCR however C. difficile testing showed negative C. difficile toxin but positive C. difficile PCR.  Typically this indicates a carrier status.  Given the patient's ongoing diarrhea  however it is unclear to what extent infection may be playing a role.  We discussed a trial of oral vancomycin 125 mg 4 times daily x 10 days and prescription was sent today.  She will notify us if in regards to her symptoms.  Will also recheck thyroid function studies today.        Plan:  Patient will proceed with Faslodex 500 mg IM injection today and continue every 28 days (today is cycle 22)  Continue abemaciclib 100 mg twice daily  Add TSH and free T4 to today's labs  Prescription sent for oral vancomycin 125 mg 4 times daily x 10 days  Patient is going to notify Dr. Antonio regarding her recent low blood pressure readings and potential need for adjustment of her dialysis regimen at home.  Patient is receiving Mircera under the direction of nephrology, dosing every 4 weeks (due next week) as well as IV iron intermittently as needed.  Continue to monitor hemoglobin closely regarding ongoing transfusion needs for hemoglobin less than 7.0.   In 2 weeks CBC and RN review  In 4 weeks MD visit with CBC, CMP and patient will be due for cycle 23 Faslodex with continuation of abemaciclib.  Plan repeat PET scan, at a 3-month interval from most recent study pending her clinical course.    Patient continues on high risk medication requiring intensive monitoring.    I did spend 40 minutes in total time caring for the patient today, time spent in review of records, face-to-face consultation, coordination of care, placement of orders, completion of documentation.

## 2023-08-24 ENCOUNTER — CLINICAL SUPPORT (OUTPATIENT)
Dept: ONCOLOGY | Facility: HOSPITAL | Age: 63
End: 2023-08-24
Payer: COMMERCIAL

## 2023-08-24 ENCOUNTER — LAB (OUTPATIENT)
Dept: LAB | Facility: HOSPITAL | Age: 63
End: 2023-08-24
Payer: COMMERCIAL

## 2023-08-24 DIAGNOSIS — C50.919 PRIMARY MALIGNANT NEOPLASM OF BREAST WITH METASTASIS: ICD-10-CM

## 2023-08-24 LAB
BASOPHILS # BLD AUTO: 0.04 10*3/MM3 (ref 0–0.2)
BASOPHILS NFR BLD AUTO: 1.7 % (ref 0–1.5)
DEPRECATED RDW RBC AUTO: 67.7 FL (ref 37–54)
EOSINOPHIL # BLD AUTO: 0.15 10*3/MM3 (ref 0–0.4)
EOSINOPHIL NFR BLD AUTO: 6.3 % (ref 0.3–6.2)
ERYTHROCYTE [DISTWIDTH] IN BLOOD BY AUTOMATED COUNT: 17.6 % (ref 12.3–15.4)
HCT VFR BLD AUTO: 25.9 % (ref 34–46.6)
HGB BLD-MCNC: 8.1 G/DL (ref 12–15.9)
IMM GRANULOCYTES # BLD AUTO: 0.01 10*3/MM3 (ref 0–0.05)
IMM GRANULOCYTES NFR BLD AUTO: 0.4 % (ref 0–0.5)
LYMPHOCYTES # BLD AUTO: 0.52 10*3/MM3 (ref 0.7–3.1)
LYMPHOCYTES NFR BLD AUTO: 21.7 % (ref 19.6–45.3)
MCH RBC QN AUTO: 33.1 PG (ref 26.6–33)
MCHC RBC AUTO-ENTMCNC: 31.3 G/DL (ref 31.5–35.7)
MCV RBC AUTO: 105.7 FL (ref 79–97)
MONOCYTES # BLD AUTO: 0.28 10*3/MM3 (ref 0.1–0.9)
MONOCYTES NFR BLD AUTO: 11.7 % (ref 5–12)
NEUTROPHILS NFR BLD AUTO: 1.4 10*3/MM3 (ref 1.7–7)
NEUTROPHILS NFR BLD AUTO: 58.2 % (ref 42.7–76)
NRBC BLD AUTO-RTO: 0 /100 WBC (ref 0–0.2)
PLATELET # BLD AUTO: 119 10*3/MM3 (ref 140–450)
PMV BLD AUTO: 9.3 FL (ref 6–12)
RBC # BLD AUTO: 2.45 10*6/MM3 (ref 3.77–5.28)
WBC NRBC COR # BLD: 2.4 10*3/MM3 (ref 3.4–10.8)

## 2023-08-24 PROCEDURE — 85025 COMPLETE CBC W/AUTO DIFF WBC: CPT

## 2023-08-24 PROCEDURE — 36415 COLL VENOUS BLD VENIPUNCTURE: CPT

## 2023-08-24 NOTE — PROGRESS NOTES
Lab Results   Component Value Date    WBC 2.40 (L) 08/24/2023    HGB 8.1 (L) 08/24/2023    HCT 25.9 (L) 08/24/2023    .7 (H) 08/24/2023     (L) 08/24/2023   Patient is here for lab review with RN. CBC reviewed, counts are stable for this patient at this time. Patient has no complaints. Copy of labs given to patient and follow up appointment reviewed. Patient is instructed to call the office with any concerns or new symptoms prior to next visit. Patient verbalized understanding and discharged in stable condition.

## 2023-08-25 DIAGNOSIS — F32.A ANXIETY AND DEPRESSION: ICD-10-CM

## 2023-08-25 DIAGNOSIS — F41.9 ANXIETY AND DEPRESSION: ICD-10-CM

## 2023-08-25 RX ORDER — SERTRALINE HYDROCHLORIDE 100 MG/1
TABLET, FILM COATED ORAL
Qty: 135 TABLET | Refills: 0 | Status: SHIPPED | OUTPATIENT
Start: 2023-08-25

## 2023-08-30 DIAGNOSIS — E11.9 CONTROLLED TYPE 2 DIABETES MELLITUS WITHOUT COMPLICATION, WITH LONG-TERM CURRENT USE OF INSULIN: ICD-10-CM

## 2023-08-30 DIAGNOSIS — Z79.4 CONTROLLED TYPE 2 DIABETES MELLITUS WITHOUT COMPLICATION, WITH LONG-TERM CURRENT USE OF INSULIN: ICD-10-CM

## 2023-08-31 RX ORDER — INSULIN DETEMIR 100 [IU]/ML
50 INJECTION, SOLUTION SUBCUTANEOUS DAILY
Qty: 11 ML | Refills: 0 | Status: SHIPPED | OUTPATIENT
Start: 2023-08-31

## 2023-09-05 NOTE — PROGRESS NOTES
Chief Complaint  Metastatic lobular breast cancer (ER/NC positive, HER-2/tj negative), anemia secondary to CKD3, CHF, peripheral neuropathy, chemotherapy related nausea chemotherapy-induced myelosuppression    Subjective        History of Present Illness  The patient returns today in follow-up with laboratory studies to review continuing on monthly Faslodex due for cycle 23 today and continuing on abemaciclib.  She has experienced difficulty with myelosuppression from abemaciclib and has required dose reduction, currently receiving 100 mg twice daily dosing.  She does continue on home dialysis 5 days/week and reports that this has been going very well.  She does continue on monthly Mircera and IV iron with nephrology, due to receive next dosing next week.    At her last visit, patient was complaining of significant diarrhea.  It was unclear whether this was related to a pulm cycle of.  She did have stool studies that showed positivity for C. difficile via PCR but negative antigen with history of C. difficile infection.  It was elected to attempt a trial of treatment with oral vancomycin 125 mg 4 times daily x10 days which was prescribed on 8/9/2023.  Patient reports that after the course of vancomycin her diarrhea has essentially resolved and she is currently having normal bowel movements.  At her last visit she was also experiencing hypotension.  She did discuss this with Dr. Hernandez and her dry weight was increased, blood pressure is still running systolic around 90 and she is only minimally symptomatic from this with occasional lightheadedness.  She did see Dr. Mckee in urology, no new recommendations from 8/21/2023.  She reports today that she feels quite well.  Her fatigue does persist and is mild currently.  She remains in a motorized scooter, mobility chronically limited but unchanged.      Objective   Vital Signs:   BP (!) 89/54 Comment: taken on forearm  Pulse 82   Temp 97.7 °F (36.5 °C) (Temporal)    "Resp 16   Ht 170 cm (66.93\")   Wt (!) 169 kg (372 lb 9.2 oz) Comment: per patient  SpO2 97%   BMI 58.48 kg/m²     Physical Exam  Constitutional:       Appearance: She is well-developed. She is obese.      Comments: Patient is in a motorized scooter today   Eyes:      Conjunctiva/sclera: Conjunctivae normal.   Neck:      Thyroid: No thyromegaly.   Cardiovascular:      Rate and Rhythm: Normal rate and regular rhythm.      Heart sounds: Murmur heard.     No friction rub. No gallop.      Comments: 2/6 murmur  Pulmonary:      Effort: No respiratory distress.      Breath sounds: Normal breath sounds.   Abdominal:      General: Bowel sounds are normal. There is no distension.      Palpations: Abdomen is soft.      Tenderness: There is no abdominal tenderness.   Musculoskeletal:         General: Swelling present.      Comments: 1+ bilateral lower extremity edema with venous stasis changes noted.  Left upper extremity fistula   Lymphadenopathy:      Head:      Right side of head: No submandibular adenopathy.      Cervical: No cervical adenopathy.      Upper Body:      Right upper body: No supraclavicular adenopathy.      Left upper body: No supraclavicular adenopathy.   Skin:     General: Skin is warm and dry.      Findings: No bruising or rash.   Neurological:      Mental Status: She is alert and oriented to person, place, and time.      Cranial Nerves: No cranial nerve deficit.      Motor: No abnormal muscle tone.      Deep Tendon Reflexes: Reflexes normal.   Psychiatric:         Behavior: Behavior normal.      Patient was examined today, unchanged from above    Result Review : Reviewed CBC, CMP from today.  Reviewed records from urology.       Assessment and Plan    *Metastatic lobular breast cancer (ER/MO positive, HER-2/tj negative):  Previous stage IIIC right breast cancer in 2003, received neoadjuvant chemotherapy (unknown regimen) with subsequent bilateral mastectomies 1/22/2004 with right axillary dissection.  " Residual 10-12 cm invasive lobular carcinoma on the right breast with 14/16+ nodes with extranodal extension.  Received adjuvant carboplatin and Navelbine chemotherapy x4 cycles  Attempted adjuvant radiation to the chest wall however interrupted due to cellulitis that required removal of implants in August 2004  Received tamoxifen from 6/22/2004 until June 2009.  Transitioned to Femara and received until June 2014  Identification of CT findings concerning for carcinomatosis on CT scans and June 2019.  PET scan confirmed hypermetabolic activity in the omentum as well as small retroperitoneal lymph nodes.  CT-guided omental biopsy 7/30/2019 with metastatic lobular carcinoma of breast primary, ER positive (greater than 95%), OK positive (90%), HER-2/tj negative (1+ IHC)  Foundation medicine liquid biopsy 2/5/2020: MSI undetermined, mutations in BETH, NF1, CHEK2.  No evidence of PIK3CA mutation.  Subsequent [facility] germline testing 11/12/2021 with BETH VUS (c.2864C>A) and POLE VUS (c.631A>T)  Initiation of Aromasin and Ibrance in August 2019  Difficulty with myelosuppression related to Ibrance requiring dose alterations, eventually changed to 100 mg for 7 days on followed by 7 days off.  CT chest abdomen pelvis 10/26/2021 with increase in left hydronephrosis with increase in left perinephric and periureteral stranding. Decrease in stone in the left kidney lower pole (7 mm).  Stable small retroperitoneal lymph and left periaortic lymph nodes.  PET scan 11/10/2021 with multiple bilateral hypermetabolic nodular pulmonary lesions, asymmetric moderate to severe left hydronephrosis with ill-defined fat stranding and soft tissue thickening in the renal sinus and surrounding the left ureter, hypermetabolic left retroperitoneal lymph node with slight increase in size, ill-defined hypermetabolic thickening in the inferior omentum with increase in size, uptake and C7 (indeterminate, recommended MRI).  Short segments of uptake in  the mid and distal esophagus possibly related to gastritis.  PET scan findings felt to be consistent with progressive malignancy.  Patient declined repeat omental biopsy.   Options for further treatment discussed on 11/12/2021.  In light of renal failure and dialysis, options are more limited.  Recommendation to continue Ibrance and discontinue Aromasin, begin Faslodex.  Discussed possible future use of single agent Taxol.    Aromasin discontinued 11/12/2021  On 11/22/2021 initiation of Faslodex with continuation of Ibrance (100 mg daily for 7 days on followed by 7 days off).  MRI cervical spine 11/18/2021 showed the right C7 normality to likely represent metastatic disease.  With solitary bone lesion, did not recommend pursuit of bone modifying agent.  Following 2 cycles Faslodex/Ibrance, PET scan on 1/11/2022 showed no change in the soft tissue superior to the dome of the bladder with hypermetabolic activity (likely related to carcinomatosis).  Significant decrease in injection of fat around the left renal pelvis and stable retroperitoneal hypermetabolic lymph nodes, decrease in size and activity in small bilateral pulmonary nodules.  Findings felt to represent evidence of response to treatment.    Ibrance held briefly beginning 1/28/2022 due to hospitalization with COVID-19 infection and C. difficile colitis.  Patient developed leukopenia/neutropenia.  Ibrance resumed at previous dosing (100 mg daily 7 days on followed by 7 days off) on 2/9/22.  Following 5 cycles Faslodex/Ibrance PET scan 4/19/2022 with evidence of mixed response.  New hypermetabolic lesion spinous process L2 (SUV 5.1) and slight increase in activity left clavicular metastasis (SUV increased 4.6-8.1).  C7 hypermetabolic mixed lytic and blastic bone lesion was unchanged.  Decrease in uptake involving omental thickening.  Stable soft tissue thickening around the left renal pelvis and ureter.  Discussion again regarding potential pursuit of IV  chemotherapy with Taxol versus continuation of Ibrance and Faslodex with radiation to sites of bony disease at L2, left clavicle, C7.  Patient opted to defer initiation of systemic chemotherapy and continue Ibrance/Faslodex with consideration of radiation.  Initiated monthly Xgeva on 5/19/2022 (cleared with nephrology).  Xgeva subsequently held due to significant hypocalcemia.  Decision made to forego further Xgeva with persistent hypocalcemia.  Palliative radiation received to lumbar spine regarding L2 metastasis 5/23- 6/7/2022  Ibrance held during radiation therapy beginning 5/22/2022.  Ibrance resumed 6/16/2022.  PET scan 7/21/2022 with stable hypermetabolic abdominal pelvic lymph nodes and subcarinal lymph node, stable bone lesions at C7, left clavicle.  Stable soft tissue thickening around the left renal pelvis with left hydronephrosis.  Uptake in adrenal glands felt to be likely reactive (no change in size).  No activity noted at L2 (previously radiated).  New hypermetabolic lesion right anterior sixth rib.  With evidence of further slight progression in bone on PET scan (new right sixth rib lesion), on 7/28/2022 discontinued Ibrance and plan to transition to abemaciclib with continuation of Faslodex.    On 8/4/2022 initiated abemaciclib at reduced dose of 50 mg twice daily.  On 8/25/2022, increased abemaciclib dose to 100 mg twice daily  PET scan 10/6/2022 with stable findings with exception of left acetabular activity report noted new activity however on prior PET scan question of focal activity present previously.  No corresponding CT abnormality and significant increase in SUV at sacral lesion.  Scan otherwise stable.  Decision to continue current treatment  Abemaciclib dose increased on 10/13/2022 up to 150 mg twice daily  PET scan 12/29/2022 with artifactual accumulation of tracer right axilla and right mastectomy bed without visualized CT abnormality.  Hypermetabolic bone lesions with decrease in level  of activity.  No new findings.  PET scan 3/30/2023 with minimal/insignificant increase in activity in multiple bone lesions.  1 new focus of hypermetabolic activity left anterior acetabulum (SUV 10.7) however previously identified activity anterolateral left acetabulum is no longer present.  Subcutaneous fluid collection lateral right anterior chest wall nearly completely resolved.  Due to continued clinical benefit from Faslodex/abemaciclib and with only 1 potential new finding in left acetabulum on PET scan, elected to continue current treatment at visit on 4/13/2023 and pursue MRI pelvis to evaluate left acetabulum with potential pursuit of radiation to solitary new left acetabular lesion.  Abemaciclib held 4/13/2023 due to neutropenia (ANC 0.95) and thrombocytopenia (platelet count 93,000).  On 4/27/2023 resumed abemaciclib 150 mg in the morning and subsequently added 100 mg dose in the evening on 5/6/2023.    Patient developed significant hematuria which had originally been interpreted as vaginal bleeding.  She then experienced migration and loss of her left ureteral stent spontaneously.  On 5/5/2023 she underwent replacement of left ureteral stent as well as cauterization of a bleeding site in the bladder.  She underwent at the same setting D&C with biopsy that showed no evidence of endometrium, minute fragment of benign endocervical mucosa.  Complete resolution of the hematuria.   MRI pelvis 5/8/2023 showed multiple areas of metastatic involvement in the pelvis, largest lesion 2.7 cm involving the anterior left acetabulum (corresponding to the new PET positive lesion).  No prior comparison.  Additional subtle 1 cm left iliac wing, 1.9 cm S2, 1.7 cm right iliac crest, 1.4 cm left pubic ramus, and 2.7 cm posterior right acetabular lesions noted.  There were several other subcentimeter foci of abnormal marrow signal in the sacrum and right iliac bone of unclear significance.  Patient received palliative  radiation to the new/progressive lesion in the left anterior acetabulum, received 20 Gray in 5 fractions completed on 5/22/2023.  Due to continued myelosuppression, then the cycle of dose decreased further from 150 mg a.m. and 100 mg p.m. down to 100 mg twice daily on 6/1/2023.  PET scan 7/3/2023 with stable bone lesions with minor variations in degree of hypermetabolic activity likely clinically insignificant.  Increase in moderate ascites with some associated hypermetabolic activity in the anterior pelvis.  Focal 1 cm hypermetabolic soft tissue nodule in the right mesentery with SUV 5.2.  Patient returns today in follow-up due for cycle 23 Faslodex 500 mg IM every 4 weeks and continuing on abemaciclib at reduced dose of 100 mg twice daily.  At the last visit the patient was complaining of significant diarrhea, did have stool positive for C. difficile via PCR but negative antigen with history of prior infection.  We did treat her with oral vancomycin x10 days beginning 8/9/2023.  Patient reports that her diarrhea has resolved completely.  Therefore it appears that her diarrhea was mainly related to infection and not necessarily related to abemaciclib.  Overall her counts have stabilized on current dose.  She does not require transfusion support at this time.  She has no clinical evidence of progressive disease.  We did discuss pursuit of 3-month interval follow-up PET scan which would be due in 3 weeks.  We will go ahead and schedule this and I will see her back in 4 weeks when she is due for cycle 24 Faslodex pending scan results.    *Anemia secondary to ESRD, underlying malignancy/chronic disease, myelosuppression from CDK 4/6 inhibitor, GI blood loss:  Anemia secondary to ESRD, malignancy with exacerbation by use of Ibrance  Previous evidence of folate deficiency 8/12/2019 with level 3.84, initiated folic acid 1 mg daily  Previous evidence of iron deficiency, received Injectafer on 8/7/2020 750 mg IV x1 dose.   Second dose not administered due to onset of fever, subsequent iron studies precluded further administration of IV iron.  Previous low normal B12 level initiated oral B12 1000 mcg daily.  Patient had previously received Procrit and required intermittent transfusion support  Following initiation of hemodialysis, management of BEAU transitioned to nephrology, receiving Epogen with dialysis.  Ongoing transfusion support prompted alteration in Ibrance dosing on 8/23/2021.  After alteration in Ibrance dosing, hemoglobin improved and remained stable in the 9-10 range.  Improved but ongoing intermittent need for transfusion support despite Ibrance dose alteration  Stool negative for occult blood x3 on 11/2/2021  Patient with reduced dialysis frequency to 2 days/week with concurrent decrease in Epogen dosing corresponding again to increased transfusion requirement.  Epogen dose increased per nephrology to 20,000 units twice weekly with dialysis on Mondays and Fridays  Ibrance again exacerbating anemia with ongoing intermittent transfusion requirements.  Ibrance subsequently discontinued on 7/28/2022 and patient initiated abemaciclib on 8/4/2022 which may be less myelosuppressive.  Additional transfusions required with hemoglobin on 3/31/2022 6.6, hemoglobin 4/21/2022 of 6.4, hemoglobin on 5/5/2022 of 6.8, hemoglobin 6.4 on 7/14/2022, hemoglobin 6.4 on 7/28/2022, hemoglobin 6.8 on 8/18/2022.  Patient did develop transient visible blood in stool in July 2022  Anemia evaluation 7/28/2022 with iron 32, ferritin 412, iron saturation 15%, TIBC 210, folate 19.9, B12 925, haptoglobin 300, .  Stool positive for occult blood x3 on 8/1/2022  Patient was seen by GI on 8/16/2022, felt to be high risk for endoscopic evaluation and elected to forego EGD/colonoscopy for now  Patient transitioned from hemodialysis in clinic to home hemodialysis 5 days/week x 2 hours beginning 8/29/2022.  With transition to home dialysis, nephrology  transition the patient from Epogen to Mircera administered every 4 weeks in their office, initiated 8/22/2022.  Symptomatic hemoglobin 7.3 on 2/16/2023, received transfusion  Additional labs on 3/16/2023 with B12 1146, folate greater than 20  Patient with significant gross hematuria with left ureteral stent migration and eventual loss.  Anemia worsened during this timeframe requiring transfusion support on 4/20/2023 with hemoglobin 6.1 and 5/3/2023 with hemoglobin 6.3.  Hemoglobin declined to 7.2 on 6/22/2023, transfuse 1 unit PRBC  Today, hemoglobin is acceptable at 8.1.  Hemoglobin appears to have stabilized recently on current dose of abemaciclib.  Patient continues with every 4-week Mircera with nephrology due next week.  We will not check CBC in 2 weeks given the recent stability of her counts.  I will see her back in 4 weeks with plans to transfuse for hemoglobin less than 7.0 or for symptomatic hemoglobin in the 7-8 range.    *ESRD on HD:  Patient with previous CKD3/4  Hospitalization 4/29-5/4/2021 with RYAN/CKD, UTI, anemia.  Required initiation of hemodialysis Tuesday Thursday Saturday.   Decrease in frequency of dialysis to twice per week.  She continues to produce a significant amount of urine.   Status post left upper extremity AV fistula placement on 11/3/2021 by vascular surgery  Attempt to hold further dialysis on 1/11/2022 with close monitoring of her laboratory and clinical parameters.  Dialysis quickly resumed due to development of hyperkalemia.  Patient was undergoing dialysis 2 days/week on Mondays and Fridays.    On 8/22/2022 patient transitioned to use of home dialysis device 5 days/week x 2 hours.  Patient continues on home hemodialysis 5 days/week.   Patient continues with borderline hypotension, BP today 89/54.  She does continue routine follow-up with nephrology regarding this issue, reports that her dry weight was increased recently.  She is only minimally symptomatic with some occasional  lightheadedness.    *CHF with severe aortic stenosis:  Echocardiogram 7/12/2019 showing ejection fraction 48% with moderate dilation of the LV cavity, global hypokinesis, grade 2 diastolic dysfunction, mild to moderate aortic stenosis, trivial pericardial effusion.  Echocardiogram 7/19/2021 with ejection fraction 56%, left atrial volume moderately increased, grade 2 diastolic dysfunction, severe calcification aortic valve, moderate aortic stenosis, severe mitral calcification, mild mitral stenosis, marked elevation in RVSP from tricuspid regurgitation.  Echocardiogram on 7/13/2022 with ejection fraction 56-60%, grade 2 diastolic dysfunction, severe aortic stenosis.    Cardiac catheterization 8/23/2022 showed normal coronary arteries, mild to moderate pulmonary hypertension.    She was evaluated by cardiothoracic surgery Dr. Pagan and there was discussion regarding potential candidacy for TAVR although there was concern given her underlying comorbidities including her metastatic breast cancer.  I discussed patient's prognosis with Dr. Pagan.  She does have opportunities for additional treatment of her malignancy with intravenous chemotherapy and is willing to pursue this in the future when needed.  It is unclear as to her expected survival however given her multiple severe comorbidities with metastatic breast cancer, ESRD on dialysis, severe aortic stenosis, severe anemia.  There is consideration of possible pursuit of balloon valvuloplasty initially to assess her symptomatic response and then consider pursuit of TAVR in the future.   Patient did undergo aortic valvuloplasty during admission 1/10 - 1/11/2023.  Patient is scheduled for follow-up with cardiology on 11/17/2023 with repeat echocardiogram    *Left upper and bilateral lower extremity peripheral neuropathy:  Patient reports that left upper extremity neuropathy was not present from previous chemotherapy but occurred after hospitalization that required  intubation and critical care stay.  Apparently felt to represent critical care neuropathy.  Improved over time.  Bilateral lower extremity neuropathy felt to be related to diabetes  May have future implications regarding treatment for breast cancer.  Patient reports that peripheral neuropathy symptoms continue in the bilateral lower extremities, unchanged.  This will be a consideration with potential future use of Taxol    *Uterine/endometrial activity on PET scan:  Patient experienced postmenopausal vaginal bleeding in 2013, negative endometrial biopsy and gynecologic evaluation.  With findings on PET scan with enlarging uterus and some hypermetabolic activity, referred back to Dr. Oliveros in gynecology.    9/19/2019 D&C with hysteroscopy by Dr. Oliveros, no significant intraoperative findings, pathologic results with benign findings, no evidence of malignancy.  Patient with question of vaginal bleeding however subsequently became evident that this was hematuria.  She was referred to gynecology, did undergo on 5/5/2023 D&C at the same time as cystoscopy, biopsy with no evidence of malignancy but no endometrium present.    *Nausea:  Mild, relieved with Zofran.   Patient experienced improvement in nausea after initiating hemodialysis  Patient has experienced some recent nausea and a few episodes of emesis however this has subsided    *Myelosuppression secondary to CDK 4/6 inhibitor:  Patient was able to maintain adequate WBC after dosing alteration on Ibrance to treatment at 100 mg daily for 7 days on followed by 7 days off.  Subsequent transition from Ibrance to abemaciclib  Overall counts improved on abemaciclib  Patient with decline in counts felt to be secondary to abemaciclib.  On 4/13/2023 abemaciclib held with WBC 1.82/ANC 0.95, platelet count 93,000. On 4/27/2023 resumed abemaciclib 150 mg in the morning and subsequently added 100 mg dose in the evening on 5/6/2023.  On 6/1/2023, further decrease in abemaciclib  dose to 100 mg twice daily due to neutropenia and thrombocytopenia.  Counts are fairly stable today with WBC 3.24, ANC 1.91 and platelet count 132,000    *Depression  Patient with history of depression, worsened after the death of her son in November 2021  With evidence of progressive malignancy in November 2021, patient agreeable to referral to supportive oncology  Patient currently receiving gabapentin 300 mg nightly, Zoloft 150 mg daily  Patient does continue with difficulties regarding depression that wax and wane.  Currently symptoms under fair control.  Patient was previously seeing supportive oncology.  She does not feel that she needs further follow-up at this time however would not rule out returning to see supportive oncology in the future if needed.  We will prescribe her Zoloft at this point.  She remains off of armodafinil.    *Left perinephric stranding secondary to malignancy with hydronephrosis:  CT 4/22/2021 showed interval enlargement of 2 staghorn calculi in the left kidney with persistent left perinephric stranding  Hospitalization 4/29-5/4/2021 with RYAN/CKD, UTI, anemia.  Required 2 unit PRBC transfusion and initiated hemodialysis Tuesday Thursday Saturday.    Discussion from urology regarding potential need for surgical procedure to address staghorn calculus left kidney  CT scan 7/2/2021 with only 1 remaining left renal stone identified which had decreased in size.  Patient appears to have passed the other stone.  As above, CT 10/26/2021 with more prominent left hydronephrosis and increase in left perinephric and periureteral stranding.  Felt to possibly be related to passage of recent stone versus partial obstruction secondary to debris or thrombus in the left ureter versus pyelonephritis.  Patient however with no clinical evidence of recent stone passage nor pyelonephritis. Malignancy felt to be the cause of CT changes around the left kidney at this point.   Left ureteral stent replacement  12/16/2021  PET scan 1/11/2022 with decrease in fat injection near left renal pelvis, stent in satisfactory position.  Mild residual hydronephrosis on the left.  Ureteral stent replaced on 3/10/2022  PET scan 4/19/2022 with no hydronephrosis, left ureteral stent in place  PET scan 7/21/2022 with persistent left hydronephrosis, ureteral stent in place  PET scan 10/6/2022 with left nephroureteral stent in place, overall stable findings  PET scan 12/29/2022 with left nephroureteral stent remaining in place, stable findings  PET scan 3/30/2023 with left ureteral stent in place.  Patient developed gross hematuria with migration and spontaneous loss of left ureteral stent.  Patient underwent stent replacement on 5/5/2023.  Cauterization of bleeding focus in the bladder.  Resolution of hematuria.  Patient did see urology 8/21/2023, plan further follow-up at 3-month interval.    *Right thyroid nodules  Patient underwent prior thyroid biopsy by her report with benign findings many years ago, records not available  On MRI cervical spine 11/18/2021, finding of 1.8 cm right thyroid nodule  Thyroid ultrasound 11/26/2021 with right-sided thyroid nodules, 1 nodule was hypoechoic, solid measuring 2 cm with biopsy recommended.  Thyroid ultrasound results reviewed with patient.  In light of her metastatic breast cancer, renal failure the significance of the thyroid nodule is felt to be much less.  We discussed possibility of thyroid biopsy however patient is inclined to forego this, especially since she has had a biopsy performed in the past with benign findings by her report.    No hypermetabolic activity identified on PET scan 1/11/2022 in the neck    *Hospitalization 1/28-1/30/2022 due to COVID-19 infection and C. difficile colitis  Patient fully vaccinated with 3 doses Moderna COVID-19 vaccine  Patient developed symptoms 1/26/2022 with abrupt onset sinus congestion, mild cough, subsequently developed nausea and diarrhea on  1/27/2022.  Significant fatigue.  Patient tested positive in emergency department on 1/28/2022 and was admitted  Patient maintained O2 saturation in mid 90% range on room air  Patient received remdesivir  Patient was also found to be positive for C. difficile colitis, received course of oral vancomycin.  Symptoms from both COVID-19 and C. difficile colitis ultimately resolved.    *Hypocalcemia  Patient developed significant hypocalcemia after receiving Xgeva on 5/19/2022  Calcium management per nephrology  No further Xgeva planned due to persistent significant hypocalcemia    *Diarrhea  Patient with history of C. difficile infection in January 2022  Patient with intermittent diarrhea since around June 2023.  Question whether ongoing intermittent diarrhea is related to abemaciclib   Stool studies on 7/31/2023 with negative GI PCR however C. difficile testing showed negative C. difficile toxin but positive C. difficile PCR.  Patient with ongoing diarrhea.  Received treatment with vancomycin p.o. 125 mg 4 times daily x10 days beginning 8/9/2023.  Patient experienced complete resolution of diarrhea  Patient today reports that her diarrhea resolved completely.  Therefore it does appear that this was related to C. difficile infection rather than effects from abemaciclib.  Monitor for recurrence        Plan:  Patient will proceed with Faslodex 500 mg IM injection today and continue every 28 days (today is cycle 23)  Continue abemaciclib 100 mg twice daily  Patient continues on home dialysis 5 days/week  Patient is receiving Mircera under the direction of nephrology, dosing every 4 weeks (due next week) as well as IV iron intermittently as needed.  Continue to monitor hemoglobin closely regarding ongoing transfusion needs for hemoglobin less than 7.0.   In 3 weeks PET scan  In 4 weeks MD visit with CBC, CMP and patient will be due for cycle 24 Faslodex with continuation of abemaciclib pending PET scan result.    Patient  continues on high risk medication requiring intensive monitoring.

## 2023-09-06 ENCOUNTER — LAB (OUTPATIENT)
Dept: LAB | Facility: HOSPITAL | Age: 63
End: 2023-09-06
Payer: COMMERCIAL

## 2023-09-06 ENCOUNTER — INFUSION (OUTPATIENT)
Dept: ONCOLOGY | Facility: HOSPITAL | Age: 63
End: 2023-09-06
Payer: COMMERCIAL

## 2023-09-06 ENCOUNTER — OFFICE VISIT (OUTPATIENT)
Dept: ONCOLOGY | Facility: CLINIC | Age: 63
End: 2023-09-06
Payer: COMMERCIAL

## 2023-09-06 VITALS
SYSTOLIC BLOOD PRESSURE: 89 MMHG | BODY MASS INDEX: 45.99 KG/M2 | WEIGHT: 293 LBS | DIASTOLIC BLOOD PRESSURE: 54 MMHG | OXYGEN SATURATION: 97 % | RESPIRATION RATE: 16 BRPM | TEMPERATURE: 97.7 F | HEIGHT: 67 IN | HEART RATE: 82 BPM

## 2023-09-06 DIAGNOSIS — C50.919 PRIMARY MALIGNANT NEOPLASM OF BREAST WITH METASTASIS: Primary | ICD-10-CM

## 2023-09-06 DIAGNOSIS — C50.919 PRIMARY MALIGNANT NEOPLASM OF BREAST WITH METASTASIS: ICD-10-CM

## 2023-09-06 LAB
ALBUMIN SERPL-MCNC: 3.1 G/DL (ref 3.5–5.2)
ALBUMIN/GLOB SERPL: 0.8 G/DL
ALP SERPL-CCNC: 91 U/L (ref 39–117)
ALT SERPL W P-5'-P-CCNC: 7 U/L (ref 1–33)
ANION GAP SERPL CALCULATED.3IONS-SCNC: 16.5 MMOL/L (ref 5–15)
AST SERPL-CCNC: 22 U/L (ref 1–32)
BASOPHILS # BLD AUTO: 0.07 10*3/MM3 (ref 0–0.2)
BASOPHILS NFR BLD AUTO: 2.2 % (ref 0–1.5)
BILIRUB SERPL-MCNC: 0.7 MG/DL (ref 0–1.2)
BUN SERPL-MCNC: 27 MG/DL (ref 8–23)
BUN/CREAT SERPL: 4.2 (ref 7–25)
CALCIUM SPEC-SCNC: 9.2 MG/DL (ref 8.6–10.5)
CHLORIDE SERPL-SCNC: 98 MMOL/L (ref 98–107)
CO2 SERPL-SCNC: 23.5 MMOL/L (ref 22–29)
CREAT SERPL-MCNC: 6.41 MG/DL (ref 0.6–1.1)
DEPRECATED RDW RBC AUTO: 61.9 FL (ref 37–54)
EGFRCR SERPLBLD CKD-EPI 2021: 6.8 ML/MIN/1.73
EOSINOPHIL # BLD AUTO: 0.19 10*3/MM3 (ref 0–0.4)
EOSINOPHIL NFR BLD AUTO: 5.9 % (ref 0.3–6.2)
ERYTHROCYTE [DISTWIDTH] IN BLOOD BY AUTOMATED COUNT: 15.9 % (ref 12.3–15.4)
GLOBULIN UR ELPH-MCNC: 3.8 GM/DL
GLUCOSE SERPL-MCNC: 108 MG/DL (ref 65–99)
HCT VFR BLD AUTO: 26.1 % (ref 34–46.6)
HGB BLD-MCNC: 8.1 G/DL (ref 12–15.9)
IMM GRANULOCYTES # BLD AUTO: 0.02 10*3/MM3 (ref 0–0.05)
IMM GRANULOCYTES NFR BLD AUTO: 0.6 % (ref 0–0.5)
LYMPHOCYTES # BLD AUTO: 0.81 10*3/MM3 (ref 0.7–3.1)
LYMPHOCYTES NFR BLD AUTO: 25 % (ref 19.6–45.3)
MCH RBC QN AUTO: 33.1 PG (ref 26.6–33)
MCHC RBC AUTO-ENTMCNC: 31 G/DL (ref 31.5–35.7)
MCV RBC AUTO: 106.5 FL (ref 79–97)
MONOCYTES # BLD AUTO: 0.24 10*3/MM3 (ref 0.1–0.9)
MONOCYTES NFR BLD AUTO: 7.4 % (ref 5–12)
NEUTROPHILS NFR BLD AUTO: 1.91 10*3/MM3 (ref 1.7–7)
NEUTROPHILS NFR BLD AUTO: 58.9 % (ref 42.7–76)
NRBC BLD AUTO-RTO: 0 /100 WBC (ref 0–0.2)
PLATELET # BLD AUTO: 132 10*3/MM3 (ref 140–450)
PMV BLD AUTO: 9.7 FL (ref 6–12)
POTASSIUM SERPL-SCNC: 3.2 MMOL/L (ref 3.5–5.2)
PROT SERPL-MCNC: 6.9 G/DL (ref 6–8.5)
RBC # BLD AUTO: 2.45 10*6/MM3 (ref 3.77–5.28)
SODIUM SERPL-SCNC: 138 MMOL/L (ref 136–145)
WBC NRBC COR # BLD: 3.24 10*3/MM3 (ref 3.4–10.8)

## 2023-09-06 PROCEDURE — 96402 CHEMO HORMON ANTINEOPL SQ/IM: CPT

## 2023-09-06 PROCEDURE — 80053 COMPREHEN METABOLIC PANEL: CPT

## 2023-09-06 PROCEDURE — 36415 COLL VENOUS BLD VENIPUNCTURE: CPT

## 2023-09-06 PROCEDURE — 25010000002 FULVESTRANT PER 25 MG: Performed by: INTERNAL MEDICINE

## 2023-09-06 PROCEDURE — 85025 COMPLETE CBC W/AUTO DIFF WBC: CPT

## 2023-09-06 RX ORDER — LAMOTRIGINE 25 MG/1
500 TABLET ORAL ONCE
Status: COMPLETED | OUTPATIENT
Start: 2023-09-06 | End: 2023-09-06

## 2023-09-06 RX ORDER — LAMOTRIGINE 25 MG/1
500 TABLET ORAL ONCE
Status: CANCELLED
Start: 2023-09-06 | End: 2023-09-06

## 2023-09-06 RX ADMIN — FULVESTRANT 500 MG: 50 INJECTION, SOLUTION INTRAMUSCULAR at 08:43

## 2023-09-06 NOTE — LETTER
September 6, 2023     BETY Rangel ([place])  [address]  [address]  [facility] [address]    Patient: Juana Hall   YOB: 1960   Date of Visit: 9/6/2023     Dear Kiana Maradiaga) BETY Noriega:       Thank you for referring Juana Hall to me for evaluation. Below are the relevant portions of my assessment and plan of care.    If you have questions, please do not hesitate to call me. I look forward to following Juana along with you.         Sincerely,        Leodan Irvin MD        CC: MD Albaro Acosta, Leodan PETTIT Jr., MD  09/06/23 0841  Sign when Signing Visit  Chief Complaint  Metastatic lobular breast cancer (ER/FL positive, HER-2/tj negative), anemia secondary to CKD3, CHF, peripheral neuropathy, chemotherapy related nausea chemotherapy-induced myelosuppression    Subjective        History of Present Illness  The patient returns today in follow-up with laboratory studies to review continuing on monthly Faslodex due for cycle 23 today and continuing on abemaciclib.  She has experienced difficulty with myelosuppression from abemaciclib and has required dose reduction, currently receiving 100 mg twice daily dosing.  She does continue on home dialysis 5 days/week and reports that this has been going very well.  She does continue on monthly Mircera and IV iron with nephrology, due to receive next dosing next week.    At her last visit, patient was complaining of significant diarrhea.  It was unclear whether this was related to a pulm cycle of.  She did have stool studies that showed positivity for C. difficile via PCR but negative antigen with history of C. difficile infection.  It was elected to attempt a trial of treatment with oral vancomycin 125 mg 4 times daily x10 days which was prescribed on 8/9/2023.  Patient reports that after the course of vancomycin her diarrhea has essentially resolved and she is currently having normal bowel movements.  At her last visit she  "was also experiencing hypotension.  She did discuss this with Dr. Hernandez and her dry weight was increased, blood pressure is still running systolic around 90 and she is only minimally symptomatic from this with occasional lightheadedness.  She did see Dr. Mckee in urology, no new recommendations from 8/21/2023.  She reports today that she feels quite well.  Her fatigue does persist and is mild currently.  She remains in a motorized scooter, mobility chronically limited but unchanged.      Objective   Vital Signs:   BP (!) 89/54 Comment: taken on forearm  Pulse 82   Temp 97.7 °F (36.5 °C) (Temporal)   Resp 16   Ht 170 cm (66.93\")   Wt (!) 169 kg (372 lb 9.2 oz) Comment: per patient  SpO2 97%   BMI 58.48 kg/m²     Physical Exam  Constitutional:       Appearance: She is well-developed. She is obese.      Comments: Patient is in a motorized scooter today   Eyes:      Conjunctiva/sclera: Conjunctivae normal.   Neck:      Thyroid: No thyromegaly.   Cardiovascular:      Rate and Rhythm: Normal rate and regular rhythm.      Heart sounds: Murmur heard.     No friction rub. No gallop.      Comments: 2/6 murmur  Pulmonary:      Effort: No respiratory distress.      Breath sounds: Normal breath sounds.   Abdominal:      General: Bowel sounds are normal. There is no distension.      Palpations: Abdomen is soft.      Tenderness: There is no abdominal tenderness.   Musculoskeletal:         General: Swelling present.      Comments: 1+ bilateral lower extremity edema with venous stasis changes noted.  Left upper extremity fistula   Lymphadenopathy:      Head:      Right side of head: No submandibular adenopathy.      Cervical: No cervical adenopathy.      Upper Body:      Right upper body: No supraclavicular adenopathy.      Left upper body: No supraclavicular adenopathy.   Skin:     General: Skin is warm and dry.      Findings: No bruising or rash.   Neurological:      Mental Status: She is alert and oriented to person, " place, and time.      Cranial Nerves: No cranial nerve deficit.      Motor: No abnormal muscle tone.      Deep Tendon Reflexes: Reflexes normal.   Psychiatric:         Behavior: Behavior normal.      Patient was examined today, unchanged from above    Result Review : Reviewed CBC, CMP from today.  Reviewed records from urology.       Assessment and Plan    *Metastatic lobular breast cancer (ER/AZ positive, HER-2/tj negative):  Previous stage IIIC right breast cancer in 2003, received neoadjuvant chemotherapy (unknown regimen) with subsequent bilateral mastectomies 1/22/2004 with right axillary dissection.  Residual 10-12 cm invasive lobular carcinoma on the right breast with 14/16+ nodes with extranodal extension.  Received adjuvant carboplatin and Navelbine chemotherapy x4 cycles  Attempted adjuvant radiation to the chest wall however interrupted due to cellulitis that required removal of implants in August 2004  Received tamoxifen from 6/22/2004 until June 2009.  Transitioned to Femara and received until June 2014  Identification of CT findings concerning for carcinomatosis on CT scans and June 2019.  PET scan confirmed hypermetabolic activity in the omentum as well as small retroperitoneal lymph nodes.  CT-guided omental biopsy 7/30/2019 with metastatic lobular carcinoma of breast primary, ER positive (greater than 95%), AZ positive (90%), HER-2/tj negative (1+ IHC)  Foundation medicine liquid biopsy 2/5/2020: MSI undetermined, mutations in BETH, NF1, CHEK2.  No evidence of PIK3CA mutation.  Subsequent [facility] germline testing 11/12/2021 with BETH VUS (c.2864C>A) and POLE VUS (c.631A>T)  Initiation of Aromasin and Ibrance in August 2019  Difficulty with myelosuppression related to Ibrance requiring dose alterations, eventually changed to 100 mg for 7 days on followed by 7 days off.  CT chest abdomen pelvis 10/26/2021 with increase in left hydronephrosis with increase in left perinephric and periureteral  stranding. Decrease in stone in the left kidney lower pole (7 mm).  Stable small retroperitoneal lymph and left periaortic lymph nodes.  PET scan 11/10/2021 with multiple bilateral hypermetabolic nodular pulmonary lesions, asymmetric moderate to severe left hydronephrosis with ill-defined fat stranding and soft tissue thickening in the renal sinus and surrounding the left ureter, hypermetabolic left retroperitoneal lymph node with slight increase in size, ill-defined hypermetabolic thickening in the inferior omentum with increase in size, uptake and C7 (indeterminate, recommended MRI).  Short segments of uptake in the mid and distal esophagus possibly related to gastritis.  PET scan findings felt to be consistent with progressive malignancy.  Patient declined repeat omental biopsy.   Options for further treatment discussed on 11/12/2021.  In light of renal failure and dialysis, options are more limited.  Recommendation to continue Ibrance and discontinue Aromasin, begin Faslodex.  Discussed possible future use of single agent Taxol.    Aromasin discontinued 11/12/2021  On 11/22/2021 initiation of Faslodex with continuation of Ibrance (100 mg daily for 7 days on followed by 7 days off).  MRI cervical spine 11/18/2021 showed the right C7 normality to likely represent metastatic disease.  With solitary bone lesion, did not recommend pursuit of bone modifying agent.  Following 2 cycles Faslodex/Ibrance, PET scan on 1/11/2022 showed no change in the soft tissue superior to the dome of the bladder with hypermetabolic activity (likely related to carcinomatosis).  Significant decrease in injection of fat around the left renal pelvis and stable retroperitoneal hypermetabolic lymph nodes, decrease in size and activity in small bilateral pulmonary nodules.  Findings felt to represent evidence of response to treatment.    Ibrance held briefly beginning 1/28/2022 due to hospitalization with COVID-19 infection and C. difficile  colitis.  Patient developed leukopenia/neutropenia.  Ibrance resumed at previous dosing (100 mg daily 7 days on followed by 7 days off) on 2/9/22.  Following 5 cycles Faslodex/Ibrance PET scan 4/19/2022 with evidence of mixed response.  New hypermetabolic lesion spinous process L2 (SUV 5.1) and slight increase in activity left clavicular metastasis (SUV increased 4.6-8.1).  C7 hypermetabolic mixed lytic and blastic bone lesion was unchanged.  Decrease in uptake involving omental thickening.  Stable soft tissue thickening around the left renal pelvis and ureter.  Discussion again regarding potential pursuit of IV chemotherapy with Taxol versus continuation of Ibrance and Faslodex with radiation to sites of bony disease at L2, left clavicle, C7.  Patient opted to defer initiation of systemic chemotherapy and continue Ibrance/Faslodex with consideration of radiation.  Initiated monthly Xgeva on 5/19/2022 (cleared with nephrology).  Xgeva subsequently held due to significant hypocalcemia.  Decision made to forego further Xgeva with persistent hypocalcemia.  Palliative radiation received to lumbar spine regarding L2 metastasis 5/23- 6/7/2022  Ibrance held during radiation therapy beginning 5/22/2022.  Ibrance resumed 6/16/2022.  PET scan 7/21/2022 with stable hypermetabolic abdominal pelvic lymph nodes and subcarinal lymph node, stable bone lesions at C7, left clavicle.  Stable soft tissue thickening around the left renal pelvis with left hydronephrosis.  Uptake in adrenal glands felt to be likely reactive (no change in size).  No activity noted at L2 (previously radiated).  New hypermetabolic lesion right anterior sixth rib.  With evidence of further slight progression in bone on PET scan (new right sixth rib lesion), on 7/28/2022 discontinued Ibrance and plan to transition to abemaciclib with continuation of Faslodex.    On 8/4/2022 initiated abemaciclib at reduced dose of 50 mg twice daily.  On 8/25/2022, increased  abemaciclib dose to 100 mg twice daily  PET scan 10/6/2022 with stable findings with exception of left acetabular activity report noted new activity however on prior PET scan question of focal activity present previously.  No corresponding CT abnormality and significant increase in SUV at sacral lesion.  Scan otherwise stable.  Decision to continue current treatment  Abemaciclib dose increased on 10/13/2022 up to 150 mg twice daily  PET scan 12/29/2022 with artifactual accumulation of tracer right axilla and right mastectomy bed without visualized CT abnormality.  Hypermetabolic bone lesions with decrease in level of activity.  No new findings.  PET scan 3/30/2023 with minimal/insignificant increase in activity in multiple bone lesions.  1 new focus of hypermetabolic activity left anterior acetabulum (SUV 10.7) however previously identified activity anterolateral left acetabulum is no longer present.  Subcutaneous fluid collection lateral right anterior chest wall nearly completely resolved.  Due to continued clinical benefit from Faslodex/abemaciclib and with only 1 potential new finding in left acetabulum on PET scan, elected to continue current treatment at visit on 4/13/2023 and pursue MRI pelvis to evaluate left acetabulum with potential pursuit of radiation to solitary new left acetabular lesion.  Abemaciclib held 4/13/2023 due to neutropenia (ANC 0.95) and thrombocytopenia (platelet count 93,000).  On 4/27/2023 resumed abemaciclib 150 mg in the morning and subsequently added 100 mg dose in the evening on 5/6/2023.    Patient developed significant hematuria which had originally been interpreted as vaginal bleeding.  She then experienced migration and loss of her left ureteral stent spontaneously.  On 5/5/2023 she underwent replacement of left ureteral stent as well as cauterization of a bleeding site in the bladder.  She underwent at the same setting D&C with biopsy that showed no evidence of endometrium,  minute fragment of benign endocervical mucosa.  Complete resolution of the hematuria.   MRI pelvis 5/8/2023 showed multiple areas of metastatic involvement in the pelvis, largest lesion 2.7 cm involving the anterior left acetabulum (corresponding to the new PET positive lesion).  No prior comparison.  Additional subtle 1 cm left iliac wing, 1.9 cm S2, 1.7 cm right iliac crest, 1.4 cm left pubic ramus, and 2.7 cm posterior right acetabular lesions noted.  There were several other subcentimeter foci of abnormal marrow signal in the sacrum and right iliac bone of unclear significance.  Patient received palliative radiation to the new/progressive lesion in the left anterior acetabulum, received 20 Gray in 5 fractions completed on 5/22/2023.  Due to continued myelosuppression, then the cycle of dose decreased further from 150 mg a.m. and 100 mg p.m. down to 100 mg twice daily on 6/1/2023.  PET scan 7/3/2023 with stable bone lesions with minor variations in degree of hypermetabolic activity likely clinically insignificant.  Increase in moderate ascites with some associated hypermetabolic activity in the anterior pelvis.  Focal 1 cm hypermetabolic soft tissue nodule in the right mesentery with SUV 5.2.  Patient returns today in follow-up due for cycle 23 Faslodex 500 mg IM every 4 weeks and continuing on abemaciclib at reduced dose of 100 mg twice daily.  At the last visit the patient was complaining of significant diarrhea, did have stool positive for C. difficile via PCR but negative antigen with history of prior infection.  We did treat her with oral vancomycin x10 days beginning 8/9/2023.  Patient reports that her diarrhea has resolved completely.  Therefore it appears that her diarrhea was mainly related to infection and not necessarily related to abemaciclib.  Overall her counts have stabilized on current dose.  She does not require transfusion support at this time.  She has no clinical evidence of progressive  disease.  We did discuss pursuit of 3-month interval follow-up PET scan which would be due in 3 weeks.  We will go ahead and schedule this and I will see her back in 4 weeks when she is due for cycle 24 Faslodex pending scan results.    *Anemia secondary to ESRD, underlying malignancy/chronic disease, myelosuppression from CDK 4/6 inhibitor, GI blood loss:  Anemia secondary to ESRD, malignancy with exacerbation by use of Ibrance  Previous evidence of folate deficiency 8/12/2019 with level 3.84, initiated folic acid 1 mg daily  Previous evidence of iron deficiency, received Injectafer on 8/7/2020 750 mg IV x1 dose.  Second dose not administered due to onset of fever, subsequent iron studies precluded further administration of IV iron.  Previous low normal B12 level initiated oral B12 1000 mcg daily.  Patient had previously received Procrit and required intermittent transfusion support  Following initiation of hemodialysis, management of BEAU transitioned to nephrology, receiving Epogen with dialysis.  Ongoing transfusion support prompted alteration in Ibrance dosing on 8/23/2021.  After alteration in Ibrance dosing, hemoglobin improved and remained stable in the 9-10 range.  Improved but ongoing intermittent need for transfusion support despite Ibrance dose alteration  Stool negative for occult blood x3 on 11/2/2021  Patient with reduced dialysis frequency to 2 days/week with concurrent decrease in Epogen dosing corresponding again to increased transfusion requirement.  Epogen dose increased per nephrology to 20,000 units twice weekly with dialysis on Mondays and Fridays  Ibrance again exacerbating anemia with ongoing intermittent transfusion requirements.  Ibrance subsequently discontinued on 7/28/2022 and patient initiated abemaciclib on 8/4/2022 which may be less myelosuppressive.  Additional transfusions required with hemoglobin on 3/31/2022 6.6, hemoglobin 4/21/2022 of 6.4, hemoglobin on 5/5/2022 of 6.8,  hemoglobin 6.4 on 7/14/2022, hemoglobin 6.4 on 7/28/2022, hemoglobin 6.8 on 8/18/2022.  Patient did develop transient visible blood in stool in July 2022  Anemia evaluation 7/28/2022 with iron 32, ferritin 412, iron saturation 15%, TIBC 210, folate 19.9, B12 925, haptoglobin 300, .  Stool positive for occult blood x3 on 8/1/2022  Patient was seen by GI on 8/16/2022, felt to be high risk for endoscopic evaluation and elected to forego EGD/colonoscopy for now  Patient transitioned from hemodialysis in clinic to home hemodialysis 5 days/week x 2 hours beginning 8/29/2022.  With transition to home dialysis, nephrology transition the patient from Epogen to Mircera administered every 4 weeks in their office, initiated 8/22/2022.  Symptomatic hemoglobin 7.3 on 2/16/2023, received transfusion  Additional labs on 3/16/2023 with B12 1146, folate greater than 20  Patient with significant gross hematuria with left ureteral stent migration and eventual loss.  Anemia worsened during this timeframe requiring transfusion support on 4/20/2023 with hemoglobin 6.1 and 5/3/2023 with hemoglobin 6.3.  Hemoglobin declined to 7.2 on 6/22/2023, transfuse 1 unit PRBC  Today, hemoglobin is acceptable at 8.1.  Hemoglobin appears to have stabilized recently on current dose of abemaciclib.  Patient continues with every 4-week Mircera with nephrology due next week.  We will not check CBC in 2 weeks given the recent stability of her counts.  I will see her back in 4 weeks with plans to transfuse for hemoglobin less than 7.0 or for symptomatic hemoglobin in the 7-8 range.    *ESRD on HD:  Patient with previous CKD3/4  Hospitalization 4/29-5/4/2021 with RYAN/CKD, UTI, anemia.  Required initiation of hemodialysis Tuesday Thursday Saturday.   Decrease in frequency of dialysis to twice per week.  She continues to produce a significant amount of urine.   Status post left upper extremity AV fistula placement on 11/3/2021 by vascular  surgery  Attempt to hold further dialysis on 1/11/2022 with close monitoring of her laboratory and clinical parameters.  Dialysis quickly resumed due to development of hyperkalemia.  Patient was undergoing dialysis 2 days/week on Mondays and Fridays.    On 8/22/2022 patient transitioned to use of home dialysis device 5 days/week x 2 hours.  Patient continues on home hemodialysis 5 days/week.   Patient continues with borderline hypotension, BP today 89/54.  She does continue routine follow-up with nephrology regarding this issue, reports that her dry weight was increased recently.  She is only minimally symptomatic with some occasional lightheadedness.    *CHF with severe aortic stenosis:  Echocardiogram 7/12/2019 showing ejection fraction 48% with moderate dilation of the LV cavity, global hypokinesis, grade 2 diastolic dysfunction, mild to moderate aortic stenosis, trivial pericardial effusion.  Echocardiogram 7/19/2021 with ejection fraction 56%, left atrial volume moderately increased, grade 2 diastolic dysfunction, severe calcification aortic valve, moderate aortic stenosis, severe mitral calcification, mild mitral stenosis, marked elevation in RVSP from tricuspid regurgitation.  Echocardiogram on 7/13/2022 with ejection fraction 56-60%, grade 2 diastolic dysfunction, severe aortic stenosis.    Cardiac catheterization 8/23/2022 showed normal coronary arteries, mild to moderate pulmonary hypertension.    She was evaluated by cardiothoracic surgery Dr. Pagan and there was discussion regarding potential candidacy for TAVR although there was concern given her underlying comorbidities including her metastatic breast cancer.  I discussed patient's prognosis with Dr. Pagan.  She does have opportunities for additional treatment of her malignancy with intravenous chemotherapy and is willing to pursue this in the future when needed.  It is unclear as to her expected survival however given her multiple severe comorbidities  with metastatic breast cancer, ESRD on dialysis, severe aortic stenosis, severe anemia.  There is consideration of possible pursuit of balloon valvuloplasty initially to assess her symptomatic response and then consider pursuit of TAVR in the future.   Patient did undergo aortic valvuloplasty during admission 1/10 - 1/11/2023.  Patient is scheduled for follow-up with cardiology on 11/17/2023 with repeat echocardiogram    *Left upper and bilateral lower extremity peripheral neuropathy:  Patient reports that left upper extremity neuropathy was not present from previous chemotherapy but occurred after hospitalization that required intubation and critical care stay.  Apparently felt to represent critical care neuropathy.  Improved over time.  Bilateral lower extremity neuropathy felt to be related to diabetes  May have future implications regarding treatment for breast cancer.  Patient reports that peripheral neuropathy symptoms continue in the bilateral lower extremities, unchanged.  This will be a consideration with potential future use of Taxol    *Uterine/endometrial activity on PET scan:  Patient experienced postmenopausal vaginal bleeding in 2013, negative endometrial biopsy and gynecologic evaluation.  With findings on PET scan with enlarging uterus and some hypermetabolic activity, referred back to Dr. Oliveros in gynecology.    9/19/2019 D&C with hysteroscopy by Dr. Oliveros, no significant intraoperative findings, pathologic results with benign findings, no evidence of malignancy.  Patient with question of vaginal bleeding however subsequently became evident that this was hematuria.  She was referred to gynecology, did undergo on 5/5/2023 D&C at the same time as cystoscopy, biopsy with no evidence of malignancy but no endometrium present.    *Nausea:  Mild, relieved with Zofran.   Patient experienced improvement in nausea after initiating hemodialysis  Patient has experienced some recent nausea and a few episodes  of emesis however this has subsided    *Myelosuppression secondary to CDK 4/6 inhibitor:  Patient was able to maintain adequate WBC after dosing alteration on Ibrance to treatment at 100 mg daily for 7 days on followed by 7 days off.  Subsequent transition from Ibrance to abemaciclib  Overall counts improved on abemaciclib  Patient with decline in counts felt to be secondary to abemaciclib.  On 4/13/2023 abemaciclib held with WBC 1.82/ANC 0.95, platelet count 93,000. On 4/27/2023 resumed abemaciclib 150 mg in the morning and subsequently added 100 mg dose in the evening on 5/6/2023.  On 6/1/2023, further decrease in abemaciclib dose to 100 mg twice daily due to neutropenia and thrombocytopenia.  Counts are fairly stable today with WBC 3.24, ANC 1.91 and platelet count 132,000    *Depression  Patient with history of depression, worsened after the death of her son in November 2021  With evidence of progressive malignancy in November 2021, patient agreeable to referral to supportive oncology  Patient currently receiving gabapentin 300 mg nightly, Zoloft 150 mg daily  Patient does continue with difficulties regarding depression that wax and wane.  Currently symptoms under fair control.  Patient was previously seeing supportive oncology.  She does not feel that she needs further follow-up at this time however would not rule out returning to see supportive oncology in the future if needed.  We will prescribe her Zoloft at this point.  She remains off of armodafinil.    *Left perinephric stranding secondary to malignancy with hydronephrosis:  CT 4/22/2021 showed interval enlargement of 2 staghorn calculi in the left kidney with persistent left perinephric stranding  Hospitalization 4/29-5/4/2021 with RYAN/CKD, UTI, anemia.  Required 2 unit PRBC transfusion and initiated hemodialysis Tuesday Thursday Saturday.    Discussion from urology regarding potential need for surgical procedure to address staghorn calculus left  kidney  CT scan 7/2/2021 with only 1 remaining left renal stone identified which had decreased in size.  Patient appears to have passed the other stone.  As above, CT 10/26/2021 with more prominent left hydronephrosis and increase in left perinephric and periureteral stranding.  Felt to possibly be related to passage of recent stone versus partial obstruction secondary to debris or thrombus in the left ureter versus pyelonephritis.  Patient however with no clinical evidence of recent stone passage nor pyelonephritis. Malignancy felt to be the cause of CT changes around the left kidney at this point.   Left ureteral stent replacement 12/16/2021  PET scan 1/11/2022 with decrease in fat injection near left renal pelvis, stent in satisfactory position.  Mild residual hydronephrosis on the left.  Ureteral stent replaced on 3/10/2022  PET scan 4/19/2022 with no hydronephrosis, left ureteral stent in place  PET scan 7/21/2022 with persistent left hydronephrosis, ureteral stent in place  PET scan 10/6/2022 with left nephroureteral stent in place, overall stable findings  PET scan 12/29/2022 with left nephroureteral stent remaining in place, stable findings  PET scan 3/30/2023 with left ureteral stent in place.  Patient developed gross hematuria with migration and spontaneous loss of left ureteral stent.  Patient underwent stent replacement on 5/5/2023.  Cauterization of bleeding focus in the bladder.  Resolution of hematuria.  Patient did see urology 8/21/2023, plan further follow-up at 3-month interval.    *Right thyroid nodules  Patient underwent prior thyroid biopsy by her report with benign findings many years ago, records not available  On MRI cervical spine 11/18/2021, finding of 1.8 cm right thyroid nodule  Thyroid ultrasound 11/26/2021 with right-sided thyroid nodules, 1 nodule was hypoechoic, solid measuring 2 cm with biopsy recommended.  Thyroid ultrasound results reviewed with patient.  In light of her metastatic  breast cancer, renal failure the significance of the thyroid nodule is felt to be much less.  We discussed possibility of thyroid biopsy however patient is inclined to forego this, especially since she has had a biopsy performed in the past with benign findings by her report.    No hypermetabolic activity identified on PET scan 1/11/2022 in the neck    *Hospitalization 1/28-1/30/2022 due to COVID-19 infection and C. difficile colitis  Patient fully vaccinated with 3 doses Moderna COVID-19 vaccine  Patient developed symptoms 1/26/2022 with abrupt onset sinus congestion, mild cough, subsequently developed nausea and diarrhea on 1/27/2022.  Significant fatigue.  Patient tested positive in emergency department on 1/28/2022 and was admitted  Patient maintained O2 saturation in mid 90% range on room air  Patient received remdesivir  Patient was also found to be positive for C. difficile colitis, received course of oral vancomycin.  Symptoms from both COVID-19 and C. difficile colitis ultimately resolved.    *Hypocalcemia  Patient developed significant hypocalcemia after receiving Xgeva on 5/19/2022  Calcium management per nephrology  No further Xgeva planned due to persistent significant hypocalcemia    *Diarrhea  Patient with history of C. difficile infection in January 2022  Patient with intermittent diarrhea since around June 2023.  Question whether ongoing intermittent diarrhea is related to abemaciclib   Stool studies on 7/31/2023 with negative GI PCR however C. difficile testing showed negative C. difficile toxin but positive C. difficile PCR.  Patient with ongoing diarrhea.  Received treatment with vancomycin p.o. 125 mg 4 times daily x10 days beginning 8/9/2023.  Patient experienced complete resolution of diarrhea  Patient today reports that her diarrhea resolved completely.  Therefore it does appear that this was related to C. difficile infection rather than effects from abemaciclib.  Monitor for  recurrence        Plan:  Patient will proceed with Faslodex 500 mg IM injection today and continue every 28 days (today is cycle 23)  Continue abemaciclib 100 mg twice daily  Patient continues on home dialysis 5 days/week  Patient is receiving Mircera under the direction of nephrology, dosing every 4 weeks (due next week) as well as IV iron intermittently as needed.  Continue to monitor hemoglobin closely regarding ongoing transfusion needs for hemoglobin less than 7.0.   In 3 weeks PET scan  In 4 weeks MD visit with CBC, CMP and patient will be due for cycle 24 Faslodex with continuation of abemaciclib pending PET scan result.    Patient continues on high risk medication requiring intensive monitoring.

## 2023-09-07 RX ORDER — FOLIC ACID 1 MG/1
TABLET ORAL
Qty: 30 TABLET | Refills: 2 | Status: SHIPPED | OUTPATIENT
Start: 2023-09-07

## 2023-09-19 DIAGNOSIS — E78.2 MIXED HYPERLIPIDEMIA: ICD-10-CM

## 2023-09-19 RX ORDER — ATORVASTATIN CALCIUM 40 MG/1
TABLET, FILM COATED ORAL
Qty: 30 TABLET | Refills: 0 | Status: SHIPPED | OUTPATIENT
Start: 2023-09-19

## 2023-09-25 ENCOUNTER — TELEPHONE (OUTPATIENT)
Dept: ONCOLOGY | Facility: CLINIC | Age: 63
End: 2023-09-25

## 2023-09-25 NOTE — TELEPHONE ENCOUNTER
Caller: Juana Hall    Relationship to patient: Self    Best call back number: [phone]    Chief complaint: SCHEDULING     Type of visit: LAB    Requested date: 9-26    Additional notes:PT IS FEELING WEAK AND REQUESTING TO HAVE A LAB

## 2023-09-25 NOTE — TELEPHONE ENCOUNTER
Caller: Juana Hall    Relationship: Self    Best call back number: [phone]     What is the best time to reach you: ANYTIME    Who are you requesting to speak with (clinical staff, provider,  specific staff member): CLINICAL    What was the call regarding: PATIENT STATED SHE MUST HAVE ALL OF HER PRESCRIPTIONS CHANGED TO 90 DAY PRESCRIPTIONS, AND HAVE THEM [facility] [place] - [address] [facility] AT [address] to [facility] - [phone]  - [phone] FX .    PATIENT IS REQUESTING THIS BE DONE BEFORE OCTOBER 1ST AS THAT IS WHEN HER NEW INSURANCE TAKES EFFECT.

## 2023-09-25 NOTE — TELEPHONE ENCOUNTER
Caller: Juana Hall     Relationship: Self     Best call back number: [phone]     Patient is needing: PT'S ISNRUANCE IS CAHNGING OCTOBER 1ST BECAUSE SHE IS TURNING 63. PT NEEDS ALL OF HER PRESCRIPTIONS CHANGED TO 90 DAY PRESCRIPTIONS     AND SENT TO:    [facility] - [place] - [address] [facility] AT [address] to [facility] - [phone]  - [phone] FX .     PATIENT IS REQUESTING THIS BE DONE BEFORE OCTOBER 1ST.

## 2023-09-25 NOTE — TELEPHONE ENCOUNTER
Called the patient to see if she needed anything ordered and she verified she did not and she said just from this point forward we would send in 90 day supplies for her. Patient v/u.

## 2023-09-26 ENCOUNTER — OFFICE VISIT (OUTPATIENT)
Dept: ONCOLOGY | Facility: CLINIC | Age: 63
End: 2023-09-26
Payer: COMMERCIAL

## 2023-09-26 ENCOUNTER — LAB (OUTPATIENT)
Dept: LAB | Facility: HOSPITAL | Age: 63
End: 2023-09-26
Payer: COMMERCIAL

## 2023-09-26 VITALS
BODY MASS INDEX: 58.48 KG/M2 | SYSTOLIC BLOOD PRESSURE: 96 MMHG | TEMPERATURE: 97.5 F | HEART RATE: 71 BPM | RESPIRATION RATE: 20 BRPM | HEIGHT: 67 IN | DIASTOLIC BLOOD PRESSURE: 59 MMHG

## 2023-09-26 DIAGNOSIS — D63.8 ANEMIA, CHRONIC DISEASE: ICD-10-CM

## 2023-09-26 DIAGNOSIS — R19.7 DIARRHEA, UNSPECIFIED TYPE: ICD-10-CM

## 2023-09-26 DIAGNOSIS — Z79.899 HIGH RISK MEDICATION USE: ICD-10-CM

## 2023-09-26 DIAGNOSIS — C50.919 PRIMARY MALIGNANT NEOPLASM OF BREAST WITH METASTASIS: ICD-10-CM

## 2023-09-26 DIAGNOSIS — C50.919 PRIMARY MALIGNANT NEOPLASM OF BREAST WITH METASTASIS: Primary | ICD-10-CM

## 2023-09-26 DIAGNOSIS — R06.02 SHORTNESS OF BREATH: ICD-10-CM

## 2023-09-26 LAB
ALBUMIN SERPL-MCNC: 3 G/DL (ref 3.5–5.2)
ALBUMIN/GLOB SERPL: 0.7 G/DL
ALP SERPL-CCNC: 100 U/L (ref 39–117)
ALT SERPL W P-5'-P-CCNC: 6 U/L (ref 1–33)
ANION GAP SERPL CALCULATED.3IONS-SCNC: 17.4 MMOL/L (ref 5–15)
AST SERPL-CCNC: 26 U/L (ref 1–32)
B PARAPERT DNA SPEC QL NAA+PROBE: NOT DETECTED
B PERT DNA SPEC QL NAA+PROBE: NOT DETECTED
BASOPHILS # BLD AUTO: 0.05 10*3/MM3 (ref 0–0.2)
BASOPHILS NFR BLD AUTO: 1.4 % (ref 0–1.5)
BILIRUB SERPL-MCNC: 0.7 MG/DL (ref 0–1.2)
BUN SERPL-MCNC: 34 MG/DL (ref 8–23)
BUN/CREAT SERPL: 4.4 (ref 7–25)
C PNEUM DNA NPH QL NAA+NON-PROBE: NOT DETECTED
CALCIUM SPEC-SCNC: 8.5 MG/DL (ref 8.6–10.5)
CHLORIDE SERPL-SCNC: 96 MMOL/L (ref 98–107)
CO2 SERPL-SCNC: 21.6 MMOL/L (ref 22–29)
CREAT SERPL-MCNC: 7.74 MG/DL (ref 0.6–1.1)
DEPRECATED RDW RBC AUTO: 67.9 FL (ref 37–54)
EGFRCR SERPLBLD CKD-EPI 2021: 5.4 ML/MIN/1.73
EOSINOPHIL # BLD AUTO: 0.25 10*3/MM3 (ref 0–0.4)
EOSINOPHIL NFR BLD AUTO: 7 % (ref 0.3–6.2)
ERYTHROCYTE [DISTWIDTH] IN BLOOD BY AUTOMATED COUNT: 17.5 % (ref 12.3–15.4)
FLUAV SUBTYP SPEC NAA+PROBE: NOT DETECTED
FLUBV RNA ISLT QL NAA+PROBE: NOT DETECTED
GLOBULIN UR ELPH-MCNC: 4.1 GM/DL
GLUCOSE SERPL-MCNC: 117 MG/DL (ref 65–99)
HADV DNA SPEC NAA+PROBE: NOT DETECTED
HCOV 229E RNA SPEC QL NAA+PROBE: NOT DETECTED
HCOV HKU1 RNA SPEC QL NAA+PROBE: NOT DETECTED
HCOV NL63 RNA SPEC QL NAA+PROBE: NOT DETECTED
HCOV OC43 RNA SPEC QL NAA+PROBE: NOT DETECTED
HCT VFR BLD AUTO: 26 % (ref 34–46.6)
HGB BLD-MCNC: 8.1 G/DL (ref 12–15.9)
HMPV RNA NPH QL NAA+NON-PROBE: NOT DETECTED
HPIV1 RNA ISLT QL NAA+PROBE: NOT DETECTED
HPIV2 RNA SPEC QL NAA+PROBE: NOT DETECTED
HPIV3 RNA NPH QL NAA+PROBE: NOT DETECTED
HPIV4 P GENE NPH QL NAA+PROBE: NOT DETECTED
IMM GRANULOCYTES # BLD AUTO: 0.02 10*3/MM3 (ref 0–0.05)
IMM GRANULOCYTES NFR BLD AUTO: 0.6 % (ref 0–0.5)
LYMPHOCYTES # BLD AUTO: 0.49 10*3/MM3 (ref 0.7–3.1)
LYMPHOCYTES NFR BLD AUTO: 13.6 % (ref 19.6–45.3)
M PNEUMO IGG SER IA-ACNC: NOT DETECTED
MCH RBC QN AUTO: 33.6 PG (ref 26.6–33)
MCHC RBC AUTO-ENTMCNC: 31.2 G/DL (ref 31.5–35.7)
MCV RBC AUTO: 107.9 FL (ref 79–97)
MONOCYTES # BLD AUTO: 0.3 10*3/MM3 (ref 0.1–0.9)
MONOCYTES NFR BLD AUTO: 8.4 % (ref 5–12)
NEUTROPHILS NFR BLD AUTO: 2.48 10*3/MM3 (ref 1.7–7)
NEUTROPHILS NFR BLD AUTO: 69 % (ref 42.7–76)
NRBC BLD AUTO-RTO: 0 /100 WBC (ref 0–0.2)
PLATELET # BLD AUTO: 121 10*3/MM3 (ref 140–450)
PMV BLD AUTO: 10 FL (ref 6–12)
POTASSIUM SERPL-SCNC: 2.9 MMOL/L (ref 3.5–5.2)
PROT SERPL-MCNC: 7.1 G/DL (ref 6–8.5)
RBC # BLD AUTO: 2.41 10*6/MM3 (ref 3.77–5.28)
RHINOVIRUS RNA SPEC NAA+PROBE: NOT DETECTED
RSV RNA NPH QL NAA+NON-PROBE: NOT DETECTED
SARS-COV-2 RNA NPH QL NAA+NON-PROBE: NOT DETECTED
SODIUM SERPL-SCNC: 135 MMOL/L (ref 136–145)
WBC NRBC COR # BLD: 3.59 10*3/MM3 (ref 3.4–10.8)

## 2023-09-26 PROCEDURE — 87449 NOS EACH ORGANISM AG IA: CPT | Performed by: NURSE PRACTITIONER

## 2023-09-26 PROCEDURE — 0202U NFCT DS 22 TRGT SARS-COV-2: CPT | Performed by: NURSE PRACTITIONER

## 2023-09-26 PROCEDURE — 85025 COMPLETE CBC W/AUTO DIFF WBC: CPT

## 2023-09-26 PROCEDURE — 80053 COMPREHEN METABOLIC PANEL: CPT

## 2023-09-26 PROCEDURE — 87507 IADNA-DNA/RNA PROBE TQ 12-25: CPT | Performed by: NURSE PRACTITIONER

## 2023-09-26 PROCEDURE — 36415 COLL VENOUS BLD VENIPUNCTURE: CPT

## 2023-09-26 PROCEDURE — 87493 C DIFF AMPLIFIED PROBE: CPT | Performed by: NURSE PRACTITIONER

## 2023-09-26 NOTE — PROGRESS NOTES
"Chief Complaint  Metastatic lobular breast cancer (ER/SD positive, HER-2/tj negative), anemia secondary to CKD3, CHF, peripheral neuropathy, chemotherapy related nausea chemotherapy-induced myelosuppression    Subjective        History of Present Illness  Patient is seen back today as a triage visit, calling requesting to be seen as she was feeling weaker and more short of breath.  As she is now reviewed in clinic we discussed that her hemoglobin is stable at 8.1.  Patient has had difficulties on and off this year most recently in July with C. difficile but when she was seen last on 9/6/2023 she noted resolution of her diarrhea following a course of vancomycin.  As she is now seen however she states that she began 3 days ago having watery stool that has been significant, for example waking up being covered in diarrhea this morning and probably having another large diarrhea stool after getting up.  She did take 2 Imodium this morning and for now has not had any further BMs.  She also notes vomiting intermittent large amounts of bile.  She in general is felt more achy, fatigued and short of breath with any exertion.  We discussed working her up for infectious organisms as her symptoms do not correlate with her known ongoing anemia which notably again is stable currently.  She has continued to take her Verzenio throughout all this.    Patient does report that she is supposed to leave on vacation this Saturday for [employer] [employer].      Objective   Vital Signs:   BP 96/59   Pulse 71   Temp 97.5 °F (36.4 °C) (Temporal)   Resp 20   Ht 170 cm (66.93\")   BMI 58.48 kg/m²     Physical Exam  Constitutional:       Appearance: She is well-developed. She is obese. She is ill-appearing.      Comments: Patient is in a motorized scooter today   Eyes:      Conjunctiva/sclera: Conjunctivae normal.   Neck:      Thyroid: No thyromegaly.   Cardiovascular:      Rate and Rhythm: Normal rate and regular rhythm.      Heart sounds: Murmur " heard.     No friction rub. No gallop.      Comments: 2/6 murmur  Pulmonary:      Effort: No respiratory distress.      Breath sounds: Normal breath sounds.   Abdominal:      General: Bowel sounds are normal. There is no distension.      Palpations: Abdomen is soft.      Tenderness: There is no abdominal tenderness.   Musculoskeletal:         General: Swelling present.      Comments: 1+ bilateral lower extremity edema with venous stasis changes noted.  Left upper extremity fistula   Lymphadenopathy:      Head:      Right side of head: No submandibular adenopathy.      Cervical: No cervical adenopathy.      Upper Body:      Right upper body: No supraclavicular adenopathy.      Left upper body: No supraclavicular adenopathy.   Skin:     General: Skin is warm and dry.      Findings: No bruising or rash.   Neurological:      Mental Status: She is alert and oriented to person, place, and time.      Cranial Nerves: No cranial nerve deficit.      Motor: No abnormal muscle tone.      Deep Tendon Reflexes: Reflexes normal.   Psychiatric:         Behavior: Behavior normal.     Result Review :   Results from last 7 days   Lab Units 09/26/23  1006   WBC 10*3/mm3 3.59   NEUTROS ABS 10*3/mm3 2.48   HEMOGLOBIN g/dL 8.1*   HEMATOCRIT % 26.0*   PLATELETS 10*3/mm3 121*     Results from last 7 days   Lab Units 09/26/23  1006   SODIUM mmol/L 135*   POTASSIUM mmol/L 2.9*   CHLORIDE mmol/L 96*   CO2 mmol/L 21.6*   BUN mg/dL 34*   CREATININE mg/dL 7.74*   CALCIUM mg/dL 8.5*   ALBUMIN g/dL 3.0*   BILIRUBIN mg/dL 0.7   ALK PHOS U/L 100   ALT (SGPT) U/L 6   AST (SGOT) U/L 26   GLUCOSE mg/dL 117*            Assessment and Plan    *Metastatic lobular breast cancer (ER/DE positive, HER-2/tj negative):  Previous stage IIIC right breast cancer in 2003, received neoadjuvant chemotherapy (unknown regimen) with subsequent bilateral mastectomies 1/22/2004 with right axillary dissection.  Residual 10-12 cm invasive lobular carcinoma on the right  breast with 14/16+ nodes with extranodal extension.  Received adjuvant carboplatin and Navelbine chemotherapy x4 cycles  Attempted adjuvant radiation to the chest wall however interrupted due to cellulitis that required removal of implants in August 2004  Received tamoxifen from 6/22/2004 until June 2009.  Transitioned to Femara and received until June 2014  Identification of CT findings concerning for carcinomatosis on CT scans and June 2019.  PET scan confirmed hypermetabolic activity in the omentum as well as small retroperitoneal lymph nodes.  CT-guided omental biopsy 7/30/2019 with metastatic lobular carcinoma of breast primary, ER positive (greater than 95%), MD positive (90%), HER-2/tj negative (1+ IHC)  [facility] medicine liquid biopsy 2/5/2020: MSI undetermined, mutations in BETH, NF1, CHEK2.  No evidence of PIK3CA mutation.  Subsequent [facility] germline testing 11/12/2021 with BETH VUS (c.2864C>A) and POLE VUS (c.631A>T)  Initiation of Aromasin and Ibrance in August 2019  Difficulty with myelosuppression related to Ibrance requiring dose alterations, eventually changed to 100 mg for 7 days on followed by 7 days off.  CT chest abdomen pelvis 10/26/2021 with increase in left hydronephrosis with increase in left perinephric and periureteral stranding. Decrease in stone in the left kidney lower pole (7 mm).  Stable small retroperitoneal lymph and left periaortic lymph nodes.  PET scan 11/10/2021 with multiple bilateral hypermetabolic nodular pulmonary lesions, asymmetric moderate to severe left hydronephrosis with ill-defined fat stranding and soft tissue thickening in the renal sinus and surrounding the left ureter, hypermetabolic left retroperitoneal lymph node with slight increase in size, ill-defined hypermetabolic thickening in the inferior omentum with increase in size, uptake and C7 (indeterminate, recommended MRI).  Short segments of uptake in the mid and distal esophagus possibly related to  gastritis.  PET scan findings felt to be consistent with progressive malignancy.  Patient declined repeat omental biopsy.   Options for further treatment discussed on 11/12/2021.  In light of renal failure and dialysis, options are more limited.  Recommendation to continue Ibrance and discontinue Aromasin, begin Faslodex.  Discussed possible future use of single agent Taxol.    Aromasin discontinued 11/12/2021  On 11/22/2021 initiation of Faslodex with continuation of Ibrance (100 mg daily for 7 days on followed by 7 days off).  MRI cervical spine 11/18/2021 showed the right C7 normality to likely represent metastatic disease.  With solitary bone lesion, did not recommend pursuit of bone modifying agent.  Following 2 cycles Faslodex/Ibrance, PET scan on 1/11/2022 showed no change in the soft tissue superior to the dome of the bladder with hypermetabolic activity (likely related to carcinomatosis).  Significant decrease in injection of fat around the left renal pelvis and stable retroperitoneal hypermetabolic lymph nodes, decrease in size and activity in small bilateral pulmonary nodules.  Findings felt to represent evidence of response to treatment.    Ibrance held briefly beginning 1/28/2022 due to hospitalization with COVID-19 infection and C. difficile colitis.  Patient developed leukopenia/neutropenia.  Ibrance resumed at previous dosing (100 mg daily 7 days on followed by 7 days off) on 2/9/22.  Following 5 cycles Faslodex/Ibrance PET scan 4/19/2022 with evidence of mixed response.  New hypermetabolic lesion spinous process L2 (SUV 5.1) and slight increase in activity left clavicular metastasis (SUV increased 4.6-8.1).  C7 hypermetabolic mixed lytic and blastic bone lesion was unchanged.  Decrease in uptake involving omental thickening.  Stable soft tissue thickening around the left renal pelvis and ureter.  Discussion again regarding potential pursuit of IV chemotherapy with Taxol versus continuation of  Ibrance and Faslodex with radiation to sites of bony disease at L2, left clavicle, C7.  Patient opted to defer initiation of systemic chemotherapy and continue Ibrance/Faslodex with consideration of radiation.  Initiated monthly Xgeva on 5/19/2022 (cleared with nephrology).  Xgeva subsequently held due to significant hypocalcemia.  Decision made to forego further Xgeva with persistent hypocalcemia.  Palliative radiation received to lumbar spine regarding L2 metastasis 5/23- 6/7/2022  Ibrance held during radiation therapy beginning 5/22/2022.  Ibrance resumed 6/16/2022.  PET scan 7/21/2022 with stable hypermetabolic abdominal pelvic lymph nodes and subcarinal lymph node, stable bone lesions at C7, left clavicle.  Stable soft tissue thickening around the left renal pelvis with left hydronephrosis.  Uptake in adrenal glands felt to be likely reactive (no change in size).  No activity noted at L2 (previously radiated).  New hypermetabolic lesion right anterior sixth rib.  With evidence of further slight progression in bone on PET scan (new right sixth rib lesion), on 7/28/2022 discontinued Ibrance and plan to transition to abemaciclib with continuation of Faslodex.    On 8/4/2022 initiated abemaciclib at reduced dose of 50 mg twice daily.  On 8/25/2022, increased abemaciclib dose to 100 mg twice daily  PET scan 10/6/2022 with stable findings with exception of left acetabular activity report noted new activity however on prior PET scan question of focal activity present previously.  No corresponding CT abnormality and significant increase in SUV at sacral lesion.  Scan otherwise stable.  Decision to continue current treatment  Abemaciclib dose increased on 10/13/2022 up to 150 mg twice daily  PET scan 12/29/2022 with artifactual accumulation of tracer right axilla and right mastectomy bed without visualized CT abnormality.  Hypermetabolic bone lesions with decrease in level of activity.  No new findings.  PET scan  3/30/2023 with minimal/insignificant increase in activity in multiple bone lesions.  1 new focus of hypermetabolic activity left anterior acetabulum (SUV 10.7) however previously identified activity anterolateral left acetabulum is no longer present.  Subcutaneous fluid collection lateral right anterior chest wall nearly completely resolved.  Due to continued clinical benefit from Faslodex/abemaciclib and with only 1 potential new finding in left acetabulum on PET scan, elected to continue current treatment at visit on 4/13/2023 and pursue MRI pelvis to evaluate left acetabulum with potential pursuit of radiation to solitary new left acetabular lesion.  Abemaciclib held 4/13/2023 due to neutropenia (ANC 0.95) and thrombocytopenia (platelet count 93,000).  On 4/27/2023 resumed abemaciclib 150 mg in the morning and subsequently added 100 mg dose in the evening on 5/6/2023.    Patient developed significant hematuria which had originally been interpreted as vaginal bleeding.  She then experienced migration and loss of her left ureteral stent spontaneously.  On 5/5/2023 she underwent replacement of left ureteral stent as well as cauterization of a bleeding site in the bladder.  She underwent at the same setting D&C with biopsy that showed no evidence of endometrium, minute fragment of benign endocervical mucosa.  Complete resolution of the hematuria.   MRI pelvis 5/8/2023 showed multiple areas of metastatic involvement in the pelvis, largest lesion 2.7 cm involving the anterior left acetabulum (corresponding to the new PET positive lesion).  No prior comparison.  Additional subtle 1 cm left iliac wing, 1.9 cm S2, 1.7 cm right iliac crest, 1.4 cm left pubic ramus, and 2.7 cm posterior right acetabular lesions noted.  There were several other subcentimeter foci of abnormal marrow signal in the sacrum and right iliac bone of unclear significance.  Patient received palliative radiation to the new/progressive lesion in the  left anterior acetabulum, received 20 Gray in 5 fractions completed on 5/22/2023.  Due to continued myelosuppression, then the cycle of dose decreased further from 150 mg a.m. and 100 mg p.m. down to 100 mg twice daily on 6/1/2023.  PET scan 7/3/2023 with stable bone lesions with minor variations in degree of hypermetabolic activity likely clinically insignificant.  Increase in moderate ascites with some associated hypermetabolic activity in the anterior pelvis.  Focal 1 cm hypermetabolic soft tissue nodule in the right mesentery with SUV 5.2.  Patient last receiving cycle 23 Faslodex on 9/6/2023 along with continuing abemaciclib 100 mg twice daily.  She is scheduled for repeat PET scan 10/9/2023 after she returns from vacation.    *Anemia secondary to ESRD, underlying malignancy/chronic disease, myelosuppression from CDK 4/6 inhibitor, GI blood loss:  Anemia secondary to ESRD, malignancy with exacerbation by use of Ibrance  Previous evidence of folate deficiency 8/12/2019 with level 3.84, initiated folic acid 1 mg daily  Previous evidence of iron deficiency, received Injectafer on 8/7/2020 750 mg IV x1 dose.  Second dose not administered due to onset of fever, subsequent iron studies precluded further administration of IV iron.  Previous low normal B12 level initiated oral B12 1000 mcg daily.  Patient had previously received Procrit and required intermittent transfusion support  Following initiation of hemodialysis, management of BEAU transitioned to nephrology, receiving Epogen with dialysis.  Ongoing transfusion support prompted alteration in Ibrance dosing on 8/23/2021.  After alteration in Ibrance dosing, hemoglobin improved and remained stable in the 9-10 range.  Improved but ongoing intermittent need for transfusion support despite Ibrance dose alteration  Stool negative for occult blood x3 on 11/2/2021  Patient with reduced dialysis frequency to 2 days/week with concurrent decrease in Epogen dosing  corresponding again to increased transfusion requirement.  Epogen dose increased per nephrology to 20,000 units twice weekly with dialysis on Mondays and Fridays  Ibrance again exacerbating anemia with ongoing intermittent transfusion requirements.  Ibrance subsequently discontinued on 7/28/2022 and patient initiated abemaciclib on 8/4/2022 which may be less myelosuppressive.  Additional transfusions required with hemoglobin on 3/31/2022 6.6, hemoglobin 4/21/2022 of 6.4, hemoglobin on 5/5/2022 of 6.8, hemoglobin 6.4 on 7/14/2022, hemoglobin 6.4 on 7/28/2022, hemoglobin 6.8 on 8/18/2022.  Patient did develop transient visible blood in stool in July 2022  Anemia evaluation 7/28/2022 with iron 32, ferritin 412, iron saturation 15%, TIBC 210, folate 19.9, B12 925, haptoglobin 300, .  Stool positive for occult blood x3 on 8/1/2022  Patient was seen by GI on 8/16/2022, felt to be high risk for endoscopic evaluation and elected to forego EGD/colonoscopy for now  Patient transitioned from hemodialysis in clinic to home hemodialysis 5 days/week x 2 hours beginning 8/29/2022.  With transition to home dialysis, nephrology transition the patient from Epogen to Mircera administered every 4 weeks in their office, initiated 8/22/2022.  Symptomatic hemoglobin 7.3 on 2/16/2023, received transfusion  Additional labs on 3/16/2023 with B12 1146, folate greater than 20  Patient with significant gross hematuria with left ureteral stent migration and eventual loss.  Anemia worsened during this timeframe requiring transfusion support on 4/20/2023 with hemoglobin 6.1 and 5/3/2023 with hemoglobin 6.3.  Hemoglobin declined to 7.2 on 6/22/2023, transfuse 1 unit PRBC  Today, hemoglobin remained stable at 8.1 which has been the level of the last 2 checks.  We typically only transfuse her for hemoglobin less than 7 as her hemoglobin is usually in the 7-8 range.  She does continue every 4-week Mircera with nephrology.    *ESRD on  HD:  Patient with previous CKD3/4  Hospitalization 4/29-5/4/2021 with RYAN/CKD, UTI, anemia.  Required initiation of hemodialysis Tuesday Thursday Saturday.   Decrease in frequency of dialysis to twice per week.  She continues to produce a significant amount of urine.   Status post left upper extremity AV fistula placement on 11/3/2021 by vascular surgery  Attempt to hold further dialysis on 1/11/2022 with close monitoring of her laboratory and clinical parameters.  Dialysis quickly resumed due to development of hyperkalemia.  Patient was undergoing dialysis 2 days/week on Mondays and Fridays.    On 8/22/2022 patient transitioned to use of home dialysis device 5 days/week x 2 hours.  Patient continues on home hemodialysis 5 days/week.   Patient continues with borderline hypotension, BP today 96/59.  She does continue routine follow-up with nephrology regarding this issue, reports that her dry weight was increased recently.  She is only minimally symptomatic with some occasional lightheadedness.    *CHF with severe aortic stenosis:  Echocardiogram 7/12/2019 showing ejection fraction 48% with moderate dilation of the LV cavity, global hypokinesis, grade 2 diastolic dysfunction, mild to moderate aortic stenosis, trivial pericardial effusion.  Echocardiogram 7/19/2021 with ejection fraction 56%, left atrial volume moderately increased, grade 2 diastolic dysfunction, severe calcification aortic valve, moderate aortic stenosis, severe mitral calcification, mild mitral stenosis, marked elevation in RVSP from tricuspid regurgitation.  Echocardiogram on 7/13/2022 with ejection fraction 56-60%, grade 2 diastolic dysfunction, severe aortic stenosis.    Cardiac catheterization 8/23/2022 showed normal coronary arteries, mild to moderate pulmonary hypertension.    She was evaluated by cardiothoracic surgery Dr. Pagan and there was discussion regarding potential candidacy for TAVR although there was concern given her underlying  comorbidities including her metastatic breast cancer.  I discussed patient's prognosis with Dr. Pagan.  She does have opportunities for additional treatment of her malignancy with intravenous chemotherapy and is willing to pursue this in the future when needed.  It is unclear as to her expected survival however given her multiple severe comorbidities with metastatic breast cancer, ESRD on dialysis, severe aortic stenosis, severe anemia.  There is consideration of possible pursuit of balloon valvuloplasty initially to assess her symptomatic response and then consider pursuit of TAVR in the future.   Patient did undergo aortic valvuloplasty during admission 1/10 - 1/11/2023.  Patient is scheduled for follow-up with cardiology on 11/17/2023 with repeat echocardiogram    *Left upper and bilateral lower extremity peripheral neuropathy:  Patient reports that left upper extremity neuropathy was not present from previous chemotherapy but occurred after hospitalization that required intubation and critical care stay.  Apparently felt to represent critical care neuropathy.  Improved over time.  Bilateral lower extremity neuropathy felt to be related to diabetes  May have future implications regarding treatment for breast cancer.  Patient reports that peripheral neuropathy symptoms continue in the bilateral lower extremities, unchanged.  This will be a consideration with potential future use of Taxol    *Uterine/endometrial activity on PET scan:  Patient experienced postmenopausal vaginal bleeding in 2013, negative endometrial biopsy and gynecologic evaluation.  With findings on PET scan with enlarging uterus and some hypermetabolic activity, referred back to Dr. Oliveros in gynecology.    9/19/2019 D&C with hysteroscopy by Dr. Oliveros, no significant intraoperative findings, pathologic results with benign findings, no evidence of malignancy.  Patient with question of vaginal bleeding however subsequently became evident that this  was hematuria.  She was referred to gynecology, did undergo on 5/5/2023 D&C at the same time as cystoscopy, biopsy with no evidence of malignancy but no endometrium present.    *Nausea:  Mild, relieved with Zofran.   Patient experienced improvement in nausea after initiating hemodialysis  9/26/2023 patient again is reporting today intermittent episodes of vomiting large amounts of bile beginning over the last 3 days.  See further discussion below.    *Myelosuppression secondary to CDK 4/6 inhibitor:  Patient was able to maintain adequate WBC after dosing alteration on Ibrance to treatment at 100 mg daily for 7 days on followed by 7 days off.  Subsequent transition from Ibrance to abemaciclib  Overall counts improved on abemaciclib  Patient with decline in counts felt to be secondary to abemaciclib.  On 4/13/2023 abemaciclib held with WBC 1.82/ANC 0.95, platelet count 93,000. On 4/27/2023 resumed abemaciclib 150 mg in the morning and subsequently added 100 mg dose in the evening on 5/6/2023.  On 6/1/2023, further decrease in abemaciclib dose to 100 mg twice daily due to neutropenia and thrombocytopenia.  Counts are fairly stable today with WBC 3.59, ANC 2.48 and platelet count 121,000    *Depression  Patient with history of depression, worsened after the death of her son in November 2021  With evidence of progressive malignancy in November 2021, patient agreeable to referral to supportive oncology  Patient currently receiving gabapentin 300 mg nightly, Zoloft 150 mg daily  Patient does continue with difficulties regarding depression that wax and wane.  Currently symptoms under fair control.  Patient was previously seeing supportive oncology.  She does not feel that she needs further follow-up at this time however would not rule out returning to see supportive oncology in the future if needed.  We will prescribe her Zoloft at this point.  She remains off of armodafinil.    *Left perinephric stranding secondary to  malignancy with hydronephrosis:  CT 4/22/2021 showed interval enlargement of 2 staghorn calculi in the left kidney with persistent left perinephric stranding  Hospitalization 4/29-5/4/2021 with RYAN/CKD, UTI, anemia.  Required 2 unit PRBC transfusion and initiated hemodialysis Tuesday Thursday Saturday.    Discussion from urology regarding potential need for surgical procedure to address staghorn calculus left kidney  CT scan 7/2/2021 with only 1 remaining left renal stone identified which had decreased in size.  Patient appears to have passed the other stone.  As above, CT 10/26/2021 with more prominent left hydronephrosis and increase in left perinephric and periureteral stranding.  Felt to possibly be related to passage of recent stone versus partial obstruction secondary to debris or thrombus in the left ureter versus pyelonephritis.  Patient however with no clinical evidence of recent stone passage nor pyelonephritis. Malignancy felt to be the cause of CT changes around the left kidney at this point.   Left ureteral stent replacement 12/16/2021  PET scan 1/11/2022 with decrease in fat injection near left renal pelvis, stent in satisfactory position.  Mild residual hydronephrosis on the left.  Ureteral stent replaced on 3/10/2022  PET scan 4/19/2022 with no hydronephrosis, left ureteral stent in place  PET scan 7/21/2022 with persistent left hydronephrosis, ureteral stent in place  PET scan 10/6/2022 with left nephroureteral stent in place, overall stable findings  PET scan 12/29/2022 with left nephroureteral stent remaining in place, stable findings  PET scan 3/30/2023 with left ureteral stent in place.  Patient developed gross hematuria with migration and spontaneous loss of left ureteral stent.  Patient underwent stent replacement on 5/5/2023.  Cauterization of bleeding focus in the bladder.  Resolution of hematuria.  Patient did see urology 8/21/2023, plan further follow-up at 3-month interval.    *Right  thyroid nodules  Patient underwent prior thyroid biopsy by her report with benign findings many years ago, records not available  On MRI cervical spine 11/18/2021, finding of 1.8 cm right thyroid nodule  Thyroid ultrasound 11/26/2021 with right-sided thyroid nodules, 1 nodule was hypoechoic, solid measuring 2 cm with biopsy recommended.  Thyroid ultrasound results reviewed with patient.  In light of her metastatic breast cancer, renal failure the significance of the thyroid nodule is felt to be much less.  We discussed possibility of thyroid biopsy however patient is inclined to forego this, especially since she has had a biopsy performed in the past with benign findings by her report.    No hypermetabolic activity identified on PET scan 1/11/2022 in the neck    *Hospitalization 1/28-1/30/2022 due to COVID-19 infection and C. difficile colitis  Patient fully vaccinated with 3 doses Moderna COVID-19 vaccine  Patient developed symptoms 1/26/2022 with abrupt onset sinus congestion, mild cough, subsequently developed nausea and diarrhea on 1/27/2022.  Significant fatigue.  Patient tested positive in emergency department on 1/28/2022 and was admitted  Patient maintained O2 saturation in mid 90% range on room air  Patient received remdesivir  Patient was also found to be positive for C. difficile colitis, received course of oral vancomycin.  Symptoms from both COVID-19 and C. difficile colitis ultimately resolved.    *Hypocalcemia  Patient developed significant hypocalcemia after receiving Xgeva on 5/19/2022  Calcium management per nephrology  No further Xgeva planned due to persistent significant hypocalcemia    *Diarrhea  Patient with history of C. difficile infection in January 2022  Patient with intermittent diarrhea since around June 2023.  Question whether ongoing intermittent diarrhea is related to abemaciclib   Stool studies on 7/31/2023 with negative GI PCR however C. difficile testing showed negative C.  difficile toxin but positive C. difficile PCR.  Patient with ongoing diarrhea.  Received treatment with vancomycin p.o. 125 mg 4 times daily x10 days beginning 8/9/2023.  Patient experienced complete resolution of diarrhea  9/26/2023 patient seen for triage visit today with constellation of symptoms including worsening fatigue, body aches, exertional shortness of breath despite stable hemoglobin, recurrent liquidy diarrhea x4 days and intermittent vomiting of large amounts of bile.  Per discussion with Dr. Irvin we will check an RVP and I have given the patient stool sample kit to bring back as soon as she can and we will recheck for C. difficile and GI panel. We will asked patient to hold Verzenio in light of acute illness.  Patient also with lower potassium of 2.9 in relation to worsening diarrhea.        Plan:  Hold Verzenio  RVP obtained, results pending.  I will call the patient once I have results.  Patient given stool kit to obtain stool sample and bring back to check for C. difficile and full GI panel.  No need for transfusion currently with stable hemoglobin of 8.1.  Patient continues on home dialysis 5 days/week  Patient is receiving Mircera under the direction of nephrology, dosing every 4 weeks (due next week) as well as IV iron intermittently as needed.  I have messaged patient's nephrologist, Dr. Tai Antonio, regarding lower K of 2.9 today. Patient states she has tried to eat more K-rich foods but this has been difficult. Defer to Dr. Antonio regarding any potential oral K supplement.  Patient is tentatively scheduled to go out of town on vacation this Saturday, 9/30/2023 and her next follow-up visit have been pushed out 1 week to accommodate this vacation.  In 2 weeks PET scan  In 3 weeks MD visit with CBC, CMP and patient will be due for cycle 24 Faslodex with continuation of abemaciclib pending PET scan result.    Patient continues on high risk medication requiring intensive monitoring.    I spent  65 minutes caring for Juana on this date of service. This time includes time spent by me in the following activities: preparing for the visit, reviewing tests, obtaining and/or reviewing a separately obtained history, performing a medically appropriate examination and/or evaluation, counseling and educating the patient/family/caregiver, ordering medications, tests, or procedures, referring and communicating with other health care professionals, documenting information in the medical record, independently interpreting results and communicating that information with the patient/family/caregiver, and care coordination

## 2023-09-27 RX ORDER — ONDANSETRON HYDROCHLORIDE 8 MG/1
TABLET, FILM COATED ORAL
Qty: 18 TABLET | Refills: 2 | Status: SHIPPED | OUTPATIENT
Start: 2023-09-27

## 2023-09-27 RX ORDER — VANCOMYCIN HYDROCHLORIDE 125 MG/1
125 CAPSULE ORAL 4 TIMES DAILY
Qty: 56 CAPSULE | Refills: 0 | Status: SHIPPED | OUTPATIENT
Start: 2023-09-27 | End: 2023-10-11

## 2023-09-27 NOTE — PROGRESS NOTES
Received call from lab that C. difficile PCR is positive.  C. difficile antigen pending.  Given her previous response to oral vancomycin, ongoing severe diarrhea, we will go ahead and treat her with vancomycin 125 mg p.o. 4 times daily x14 days.  Prescription sent and patient notified.

## 2023-09-28 ENCOUNTER — TELEPHONE (OUTPATIENT)
Dept: ONCOLOGY | Facility: CLINIC | Age: 63
End: 2023-09-28
Payer: COMMERCIAL

## 2023-09-28 ENCOUNTER — APPOINTMENT (OUTPATIENT)
Dept: CT IMAGING | Facility: HOSPITAL | Age: 63
DRG: 391 | End: 2023-09-28
Payer: COMMERCIAL

## 2023-09-28 ENCOUNTER — HOSPITAL ENCOUNTER (INPATIENT)
Facility: HOSPITAL | Age: 63
LOS: 2 days | Discharge: HOME | DRG: 391 | End: 2023-09-30
Attending: EMERGENCY MEDICINE | Admitting: INTERNAL MEDICINE
Payer: COMMERCIAL

## 2023-09-28 DIAGNOSIS — E87.6 HYPOKALEMIA: ICD-10-CM

## 2023-09-28 DIAGNOSIS — R18.0 MALIGNANT ASCITES: ICD-10-CM

## 2023-09-28 DIAGNOSIS — Z99.2 END STAGE RENAL DISEASE ON DIALYSIS: ICD-10-CM

## 2023-09-28 DIAGNOSIS — C50.919 PRIMARY MALIGNANT NEOPLASM OF BREAST WITH METASTASIS: ICD-10-CM

## 2023-09-28 DIAGNOSIS — R53.1 GENERALIZED WEAKNESS: ICD-10-CM

## 2023-09-28 DIAGNOSIS — R11.10 VOMITING AND DIARRHEA: Primary | ICD-10-CM

## 2023-09-28 DIAGNOSIS — R19.7 VOMITING AND DIARRHEA: Primary | ICD-10-CM

## 2023-09-28 DIAGNOSIS — N18.6 END STAGE RENAL DISEASE ON DIALYSIS: ICD-10-CM

## 2023-09-28 DIAGNOSIS — D61.818 PANCYTOPENIA: ICD-10-CM

## 2023-09-28 LAB
ALBUMIN SERPL-MCNC: 2.7 G/DL (ref 3.5–5.2)
ALBUMIN/GLOB SERPL: 0.6 G/DL
ALP SERPL-CCNC: 104 U/L (ref 39–117)
ALT SERPL W P-5'-P-CCNC: 11 U/L (ref 1–33)
ANION GAP SERPL CALCULATED.3IONS-SCNC: 15 MMOL/L (ref 5–15)
AST SERPL-CCNC: 12 U/L (ref 1–32)
BASOPHILS # BLD AUTO: 0.03 10*3/MM3 (ref 0–0.2)
BASOPHILS NFR BLD AUTO: 1.1 % (ref 0–1.5)
BILIRUB SERPL-MCNC: 0.6 MG/DL (ref 0–1.2)
BUN SERPL-MCNC: 26 MG/DL (ref 8–23)
BUN/CREAT SERPL: 3.8 (ref 7–25)
CALCIUM SPEC-SCNC: 8.7 MG/DL (ref 8.6–10.5)
CHLORIDE SERPL-SCNC: 95 MMOL/L (ref 98–107)
CO2 SERPL-SCNC: 27 MMOL/L (ref 22–29)
CREAT SERPL-MCNC: 6.85 MG/DL (ref 0.57–1)
D-LACTATE SERPL-SCNC: 1.2 MMOL/L (ref 0.5–2)
DEPRECATED RDW RBC AUTO: 57.6 FL (ref 37–54)
EGFRCR SERPLBLD CKD-EPI 2021: 6.3 ML/MIN/1.73
EOSINOPHIL # BLD AUTO: 0.03 10*3/MM3 (ref 0–0.4)
EOSINOPHIL NFR BLD AUTO: 1.1 % (ref 0.3–6.2)
ERYTHROCYTE [DISTWIDTH] IN BLOOD BY AUTOMATED COUNT: 15.7 % (ref 12.3–15.4)
GLOBULIN UR ELPH-MCNC: 4.2 GM/DL
GLUCOSE SERPL-MCNC: 178 MG/DL (ref 65–99)
HCT VFR BLD AUTO: 22.6 % (ref 34–46.6)
HGB BLD-MCNC: 7.4 G/DL (ref 12–15.9)
IMM GRANULOCYTES # BLD AUTO: 0.01 10*3/MM3 (ref 0–0.05)
IMM GRANULOCYTES NFR BLD AUTO: 0.4 % (ref 0–0.5)
INR PPP: 1.29 (ref 0.9–1.1)
LIPASE SERPL-CCNC: 22 U/L (ref 13–60)
LYMPHOCYTES # BLD AUTO: 0.26 10*3/MM3 (ref 0.7–3.1)
LYMPHOCYTES NFR BLD AUTO: 9.8 % (ref 19.6–45.3)
MAGNESIUM SERPL-MCNC: 1.9 MG/DL (ref 1.6–2.4)
MCH RBC QN AUTO: 32.9 PG (ref 26.6–33)
MCHC RBC AUTO-ENTMCNC: 32.7 G/DL (ref 31.5–35.7)
MCV RBC AUTO: 100.4 FL (ref 79–97)
MONOCYTES # BLD AUTO: 0.14 10*3/MM3 (ref 0.1–0.9)
MONOCYTES NFR BLD AUTO: 5.3 % (ref 5–12)
NEUTROPHILS NFR BLD AUTO: 2.17 10*3/MM3 (ref 1.7–7)
NEUTROPHILS NFR BLD AUTO: 82.3 % (ref 42.7–76)
NRBC BLD AUTO-RTO: 0 /100 WBC (ref 0–0.2)
PLATELET # BLD AUTO: 110 10*3/MM3 (ref 140–450)
PMV BLD AUTO: 9.7 FL (ref 6–12)
POTASSIUM SERPL-SCNC: 2.8 MMOL/L (ref 3.5–5.2)
PROCALCITONIN SERPL-MCNC: 0.57 NG/ML (ref 0–0.25)
PROT SERPL-MCNC: 6.9 G/DL (ref 6–8.5)
PROTHROMBIN TIME: 16.3 SECONDS (ref 11.7–14.2)
RBC # BLD AUTO: 2.25 10*6/MM3 (ref 3.77–5.28)
SODIUM SERPL-SCNC: 137 MMOL/L (ref 136–145)
WBC NRBC COR # BLD: 2.64 10*3/MM3 (ref 3.4–10.8)

## 2023-09-28 PROCEDURE — 25010000002 ONDANSETRON PER 1 MG: Performed by: EMERGENCY MEDICINE

## 2023-09-28 PROCEDURE — 99285 EMERGENCY DEPT VISIT HI MDM: CPT

## 2023-09-28 PROCEDURE — 83690 ASSAY OF LIPASE: CPT | Performed by: EMERGENCY MEDICINE

## 2023-09-28 PROCEDURE — 87040 BLOOD CULTURE FOR BACTERIA: CPT | Performed by: EMERGENCY MEDICINE

## 2023-09-28 PROCEDURE — 83605 ASSAY OF LACTIC ACID: CPT | Performed by: EMERGENCY MEDICINE

## 2023-09-28 PROCEDURE — 74176 CT ABD & PELVIS W/O CONTRAST: CPT

## 2023-09-28 PROCEDURE — 25010000002 PROCHLORPERAZINE 10 MG/2ML SOLUTION: Performed by: EMERGENCY MEDICINE

## 2023-09-28 PROCEDURE — 25010000002 DIPHENHYDRAMINE PER 50 MG: Performed by: EMERGENCY MEDICINE

## 2023-09-28 PROCEDURE — 84145 PROCALCITONIN (PCT): CPT | Performed by: EMERGENCY MEDICINE

## 2023-09-28 PROCEDURE — 85610 PROTHROMBIN TIME: CPT | Performed by: EMERGENCY MEDICINE

## 2023-09-28 PROCEDURE — 36415 COLL VENOUS BLD VENIPUNCTURE: CPT | Performed by: EMERGENCY MEDICINE

## 2023-09-28 PROCEDURE — 80053 COMPREHEN METABOLIC PANEL: CPT | Performed by: EMERGENCY MEDICINE

## 2023-09-28 PROCEDURE — 0 POTASSIUM CHLORIDE 10 MEQ/100ML SOLUTION: Performed by: EMERGENCY MEDICINE

## 2023-09-28 PROCEDURE — 85025 COMPLETE CBC W/AUTO DIFF WBC: CPT | Performed by: EMERGENCY MEDICINE

## 2023-09-28 PROCEDURE — 83735 ASSAY OF MAGNESIUM: CPT | Performed by: EMERGENCY MEDICINE

## 2023-09-28 RX ORDER — METRONIDAZOLE 500 MG/100ML
500 INJECTION, SOLUTION INTRAVENOUS ONCE
Status: DISCONTINUED | OUTPATIENT
Start: 2023-09-28 | End: 2023-09-28

## 2023-09-28 RX ORDER — SODIUM CHLORIDE 9 MG/ML
75 INJECTION, SOLUTION INTRAVENOUS CONTINUOUS
Status: DISCONTINUED | OUTPATIENT
Start: 2023-09-29 | End: 2023-09-29

## 2023-09-28 RX ORDER — NICOTINE POLACRILEX 4 MG
15 LOZENGE BUCCAL
Status: DISCONTINUED | OUTPATIENT
Start: 2023-09-28 | End: 2023-09-30 | Stop reason: HOSPADM

## 2023-09-28 RX ORDER — BISACODYL 5 MG/1
5 TABLET, DELAYED RELEASE ORAL DAILY PRN
Status: DISCONTINUED | OUTPATIENT
Start: 2023-09-28 | End: 2023-09-30 | Stop reason: HOSPADM

## 2023-09-28 RX ORDER — AMOXICILLIN 250 MG
2 CAPSULE ORAL 2 TIMES DAILY
Status: DISCONTINUED | OUTPATIENT
Start: 2023-09-28 | End: 2023-09-30 | Stop reason: HOSPADM

## 2023-09-28 RX ORDER — ONDANSETRON 4 MG/1
4 TABLET, FILM COATED ORAL EVERY 6 HOURS PRN
Status: DISCONTINUED | OUTPATIENT
Start: 2023-09-28 | End: 2023-09-30 | Stop reason: HOSPADM

## 2023-09-28 RX ORDER — GABAPENTIN 300 MG/1
300 CAPSULE ORAL NIGHTLY
Status: DISCONTINUED | OUTPATIENT
Start: 2023-09-29 | End: 2023-09-30 | Stop reason: HOSPADM

## 2023-09-28 RX ORDER — UREA 10 %
3 LOTION (ML) TOPICAL NIGHTLY PRN
Status: DISCONTINUED | OUTPATIENT
Start: 2023-09-28 | End: 2023-09-30 | Stop reason: HOSPADM

## 2023-09-28 RX ORDER — DEXTROSE MONOHYDRATE 25 G/50ML
25 INJECTION, SOLUTION INTRAVENOUS
Status: DISCONTINUED | OUTPATIENT
Start: 2023-09-28 | End: 2023-09-30 | Stop reason: HOSPADM

## 2023-09-28 RX ORDER — HYDROCODONE BITARTRATE AND ACETAMINOPHEN 5; 325 MG/1; MG/1
1 TABLET ORAL EVERY 4 HOURS PRN
Status: DISCONTINUED | OUTPATIENT
Start: 2023-09-28 | End: 2023-09-30 | Stop reason: HOSPADM

## 2023-09-28 RX ORDER — PROCHLORPERAZINE EDISYLATE 5 MG/ML
10 INJECTION INTRAMUSCULAR; INTRAVENOUS ONCE
Status: COMPLETED | OUTPATIENT
Start: 2023-09-28 | End: 2023-09-28

## 2023-09-28 RX ORDER — FOLIC ACID 1 MG/1
1000 TABLET ORAL DAILY
Status: DISCONTINUED | OUTPATIENT
Start: 2023-09-29 | End: 2023-09-30 | Stop reason: HOSPADM

## 2023-09-28 RX ORDER — CHOLECALCIFEROL (VITAMIN D3) 125 MCG
1000 CAPSULE ORAL DAILY
Status: DISCONTINUED | OUTPATIENT
Start: 2023-09-29 | End: 2023-09-30 | Stop reason: HOSPADM

## 2023-09-28 RX ORDER — BISACODYL 10 MG
10 SUPPOSITORY, RECTAL RECTAL DAILY PRN
Status: DISCONTINUED | OUTPATIENT
Start: 2023-09-28 | End: 2023-09-30 | Stop reason: HOSPADM

## 2023-09-28 RX ORDER — PROCHLORPERAZINE EDISYLATE 5 MG/ML
10 INJECTION INTRAMUSCULAR; INTRAVENOUS EVERY 6 HOURS PRN
Status: DISCONTINUED | OUTPATIENT
Start: 2023-09-28 | End: 2023-09-30 | Stop reason: HOSPADM

## 2023-09-28 RX ORDER — DIPHENHYDRAMINE HYDROCHLORIDE 50 MG/ML
25 INJECTION INTRAMUSCULAR; INTRAVENOUS ONCE
Status: COMPLETED | OUTPATIENT
Start: 2023-09-28 | End: 2023-09-28

## 2023-09-28 RX ORDER — ACETAMINOPHEN 325 MG/1
650 TABLET ORAL EVERY 4 HOURS PRN
Status: DISCONTINUED | OUTPATIENT
Start: 2023-09-28 | End: 2023-09-30 | Stop reason: HOSPADM

## 2023-09-28 RX ORDER — POTASSIUM CHLORIDE 7.45 MG/ML
10 INJECTION INTRAVENOUS ONCE
Status: COMPLETED | OUTPATIENT
Start: 2023-09-28 | End: 2023-09-28

## 2023-09-28 RX ORDER — ONDANSETRON 2 MG/ML
4 INJECTION INTRAMUSCULAR; INTRAVENOUS ONCE
Status: COMPLETED | OUTPATIENT
Start: 2023-09-28 | End: 2023-09-28

## 2023-09-28 RX ORDER — POLYETHYLENE GLYCOL 3350 17 G/17G
17 POWDER, FOR SOLUTION ORAL DAILY PRN
Status: DISCONTINUED | OUTPATIENT
Start: 2023-09-28 | End: 2023-09-30 | Stop reason: HOSPADM

## 2023-09-28 RX ORDER — LEVOTHYROXINE SODIUM 0.05 MG/1
50 TABLET ORAL
Status: DISCONTINUED | OUTPATIENT
Start: 2023-09-29 | End: 2023-09-30 | Stop reason: HOSPADM

## 2023-09-28 RX ORDER — INSULIN LISPRO 100 [IU]/ML
2-7 INJECTION, SOLUTION INTRAVENOUS; SUBCUTANEOUS
Status: DISCONTINUED | OUTPATIENT
Start: 2023-09-29 | End: 2023-09-30 | Stop reason: HOSPADM

## 2023-09-28 RX ORDER — IBUPROFEN 600 MG/1
1 TABLET ORAL
Status: DISCONTINUED | OUTPATIENT
Start: 2023-09-28 | End: 2023-09-30 | Stop reason: HOSPADM

## 2023-09-28 RX ORDER — ONDANSETRON 2 MG/ML
4 INJECTION INTRAMUSCULAR; INTRAVENOUS EVERY 6 HOURS PRN
Status: DISCONTINUED | OUTPATIENT
Start: 2023-09-28 | End: 2023-09-30 | Stop reason: HOSPADM

## 2023-09-28 RX ORDER — SODIUM CHLORIDE, SODIUM LACTATE, POTASSIUM CHLORIDE, CALCIUM CHLORIDE 600; 310; 30; 20 MG/100ML; MG/100ML; MG/100ML; MG/100ML
50 INJECTION, SOLUTION INTRAVENOUS CONTINUOUS
Status: DISCONTINUED | OUTPATIENT
Start: 2023-09-28 | End: 2023-09-29

## 2023-09-28 RX ADMIN — DIPHENHYDRAMINE HYDROCHLORIDE 25 MG: 50 INJECTION, SOLUTION INTRAMUSCULAR; INTRAVENOUS at 17:43

## 2023-09-28 RX ADMIN — ONDANSETRON 4 MG: 2 INJECTION INTRAMUSCULAR; INTRAVENOUS at 14:04

## 2023-09-28 RX ADMIN — SODIUM CHLORIDE, POTASSIUM CHLORIDE, SODIUM LACTATE AND CALCIUM CHLORIDE 125 ML/HR: 600; 310; 30; 20 INJECTION, SOLUTION INTRAVENOUS at 13:55

## 2023-09-28 RX ADMIN — SODIUM CHLORIDE, POTASSIUM CHLORIDE, SODIUM LACTATE AND CALCIUM CHLORIDE 500 ML: 600; 310; 30; 20 INJECTION, SOLUTION INTRAVENOUS at 13:55

## 2023-09-28 RX ADMIN — SODIUM CHLORIDE, POTASSIUM CHLORIDE, SODIUM LACTATE AND CALCIUM CHLORIDE 125 ML/HR: 600; 310; 30; 20 INJECTION, SOLUTION INTRAVENOUS at 17:42

## 2023-09-28 RX ADMIN — POTASSIUM CHLORIDE 10 MEQ: 7.46 INJECTION, SOLUTION INTRAVENOUS at 17:55

## 2023-09-28 RX ADMIN — PROCHLORPERAZINE EDISYLATE 10 MG: 5 INJECTION INTRAMUSCULAR; INTRAVENOUS at 17:51

## 2023-09-28 NOTE — ED NOTES
Patient states that she does not urinate that much due to being on hemodialysis - is unable to urinate at this time. Patient states that she has not had a bowel movement since 4pm yesterday.

## 2023-09-28 NOTE — ED NOTES
"Nursing report ED to floor  Juana Hall  62 y.o.  female    HPI :   Chief Complaint   Patient presents with    Vomiting       Admitting doctor:   Mary Rose MD    Admitting diagnosis:   The primary encounter diagnosis was Vomiting and diarrhea. Diagnoses of End stage renal disease on dialysis, Hypokalemia, Malignant ascites, Pancytopenia, Primary malignant neoplasm of breast with metastasis, and Generalized weakness were also pertinent to this visit.    Code status:   Current Code Status       Date Active Code Status Order ID Comments User Context       Prior            Allergies:   Patient has no known allergies.    Isolation:   Contact Spore    Intake and Output    Intake/Output Summary (Last 24 hours) at 9/28/2023 1759  Last data filed at 9/28/2023 1430  Gross per 24 hour   Intake 500 ml   Output --   Net 500 ml       Weight:       09/28/23  1159   Weight: (!) 169 kg (372 lb 9.2 oz)       Most recent vitals:   Vitals:    09/28/23 1159 09/28/23 1332 09/28/23 1635 09/28/23 1731   BP: 115/51   133/59   Pulse:  82 81 79   Resp:    16   Temp:       SpO2:  93% 96% 94%   Weight: (!) 169 kg (372 lb 9.2 oz)      Height: 170.2 cm (67\")          Active LDAs/IV Access:   Lines, Drains & Airways       Active LDAs       Name Placement date Placement time Site Days    Peripheral IV 09/28/23 1354 Right Antecubital 09/28/23  1354  Antecubital  less than 1                    Labs (abnormal labs have a star):   Labs Reviewed   COMPREHENSIVE METABOLIC PANEL - Abnormal; Notable for the following components:       Result Value    Glucose 178 (*)     BUN 26 (*)     Creatinine 6.85 (*)     Potassium 2.8 (*)     Chloride 95 (*)     Albumin 2.7 (*)     BUN/Creatinine Ratio 3.8 (*)     eGFR 6.3 (*)     All other components within normal limits    Narrative:     GFR Normal >60  Chronic Kidney Disease <60  Kidney Failure <15     PROTIME-INR - Abnormal; Notable for the following components:    Protime 16.3 (*)     INR 1.29 (*)  " "   All other components within normal limits   PROCALCITONIN - Abnormal; Notable for the following components:    Procalcitonin 0.57 (*)     All other components within normal limits    Narrative:     As a Marker for Sepsis (Non-Neonates):    1. <0.5 ng/mL represents a low risk of severe sepsis and/or septic shock.  2. >2 ng/mL represents a high risk of severe sepsis and/or septic shock.    As a Marker for Lower Respiratory Tract Infections that require antibiotic therapy:    PCT on Admission    Antibiotic Therapy       6-12 Hrs later    >0.5                Strongly Recommended  >0.25 - <0.5        Recommended   0.1 - 0.25          Discouraged              Remeasure/reassess PCT  <0.1                Strongly Discouraged     Remeasure/reassess PCT    As 28 day mortality risk marker: \"Change in Procalcitonin Result\" (>80% or <=80%) if Day 0 (or Day 1) and Day 4 values are available. Refer to http://www.[employer]-pct-calculator.com    Change in PCT <=80%  A decrease of PCT levels below or equal to 80% defines a positive change in PCT test result representing a higher risk for 28-day all-cause mortality of patients diagnosed with severe sepsis for septic shock.    Change in PCT >80%  A decrease of PCT levels of more than 80% defines a negative change in PCT result representing a lower risk for 28-day all-cause mortality of patients diagnosed with severe sepsis or septic shock.      CBC WITH AUTO DIFFERENTIAL - Abnormal; Notable for the following components:    WBC 2.64 (*)     RBC 2.25 (*)     Hemoglobin 7.4 (*)     Hematocrit 22.6 (*)     .4 (*)     RDW 15.7 (*)     RDW-SD 57.6 (*)     Platelets 110 (*)     Neutrophil % 82.3 (*)     Lymphocyte % 9.8 (*)     Lymphocytes, Absolute 0.26 (*)     All other components within normal limits   LIPASE - Normal   LACTIC ACID, PLASMA - Normal   BLOOD CULTURE   BLOOD CULTURE   URINALYSIS W/ MICROSCOPIC IF INDICATED (NO CULTURE)   MAGNESIUM   CBC AND DIFFERENTIAL    Narrative:  "    The following orders were created for panel order CBC & Differential.  Procedure                               Abnormality         Status                     ---------                               -----------         ------                     CBC Auto Differential[[id number]]        Abnormal            Final result                 Please view results for these tests on the individual orders.       EKG:   No orders to display       Meds given in ED:   Medications   lactated ringers infusion (125 mL/hr Intravenous New Bag 9/28/23 1742)   potassium chloride 10 mEq in 100 mL IVPB (10 mEq Intravenous New Bag 9/28/23 1755)   lactated ringers bolus 500 mL (0 mL Intravenous Stopped 9/28/23 1430)   ondansetron (ZOFRAN) injection 4 mg (4 mg Intravenous Given 9/28/23 1404)   prochlorperazine (COMPAZINE) injection 10 mg (10 mg Intravenous Given 9/28/23 1751)   diphenhydrAMINE (BENADRYL) injection 25 mg (25 mg Intravenous Given 9/28/23 1743)       Imaging results:  CT Abdomen Pelvis Without Contrast    Result Date: 9/28/2023  Large amount of ascites. Incidental findings as above.  Findings: Limited evaluation of the inferior thorax demonstrates atelectasis, without consolidation, pleural effusion, or pneumothorax. The heart is mildly enlarged, without pericardial effusion. Mitral annular calcifications are seen.  There is a large amount of ascites. The kidneys are atrophic. There is a left-sided double-J ureteral stent.  The liver, spleen, adrenal glands, pancreas, stomach, small bowel, large bowel, uterus, and abdominal vasculature are normal as evaluated on this noncontrast examination. No intraperitoneal free gas is seen. No enlarged lymph nodes are demonstrated. Bilateral external iliac chain lymph nodes are smaller, measuring up to 9 mm in short axis diameter.  Bone windows demonstrate degenerative changes, without suspicious osseous lesion seen.  IMPRESSION: 1. Large amount of ascites, new  2. Smaller external iliac  chain lymph nodes  3. Incidental findings as above.  This report was finalized on 9/28/2023 3:19 PM by Dr. Ashish Famrer M.D.       Ambulatory status:   - assist    Social issues:   Social History     Socioeconomic History    Marital status:      Spouse name: Rk   Tobacco Use    Smoking status: Never    Smokeless tobacco: Never   Vaping Use    Vaping Use: Never used   Substance and Sexual Activity    Alcohol use: No    Drug use: Never    Sexual activity: Yes     Partners: Male     Birth control/protection: Post-menopausal       NIH Stroke Scale:       Florence Ledezma RN  09/28/23 17:59 EDT

## 2023-09-28 NOTE — ED NOTES
Patient states she was diagnosed with cdiff yesterday and has been vomiting for 24 hours. Patient states she takes hemodialysis at home 5 days per week.

## 2023-09-28 NOTE — TELEPHONE ENCOUNTER
Called the patient back. She stated she has been throwing up stomach bile and that she has not been able to keep anything down or even drank water for 24 hours. I recommended that she be evaluated in the last 24 hours. I let her know that with everything going on we would recommend that she be evaluated by the ER for acute intervention. Patient v/u.

## 2023-09-28 NOTE — ED PROVIDER NOTES
EMERGENCY DEPARTMENT ENCOUNTER    Room Number:  28/28  Date seen:  9/28/2023  PCP: Kiana Noriega APRN ([place])  Historian: Patient      HPI:  Chief Complaint: Vomiting and diarrhea  Context: Juana Hall is a 62 y.o. female who presents to the ED c/o vomiting and diarrhea.  The patient has a history of metastatic breast cancer.  Yesterday her oncologist ordered a GI panel and C. difficile.  The GI panel was negative but the C. difficile was positive.  He ordered the patient oral vancomycin because she has taken this for C. difficile in the past.  However the patient has not been able to keep anything down including pills or water for the past 24 hours.  She has had severe diarrhea.  She called her oncologist back who advised her to come to the emergency room for further evaluation and care.  The patient states that her symptoms started on Monday and currently the vomiting is worse than the diarrhea.  She states this is the third time that she has had C. difficile with the last one being several months ago.  She states she has not had any recent antibiotics.  She reports subjective fevers and chills.  She reports mild abdominal cramping.  Patient does have end-stage renal disease and is on home hemodialysis 5 days/week.  Dr. Ledesma is her nephrologist.      PAST MEDICAL HISTORY  Active Ambulatory Problems     Diagnosis Date Noted    Anxiety     Depression     Diabetes type 2, controlled     Hyperlipidemia     Heart murmur     Hypertension     Post-traumatic osteoarthritis of multiple joints     Psoriasis     Radiculopathy     Acquired hypothyroidism 09/26/2016    Peripheral neuropathy 02/25/2019    Normocytic anemia 07/22/2019    History of right breast cancer 07/22/2019    Omental mass 07/22/2019    Primary malignant neoplasm of breast with metastasis 08/12/2019    Elevated serum creatinine 09/10/2019    Iron deficiency anemia 08/07/2020    Anemia in stage 3 chronic kidney disease 09/04/2020    Stage 3b chronic  kidney disease 03/10/2021    Anxiety and depression     RYAN (acute kidney injury) 04/29/2021    Anemia, chronic disease 04/29/2021    Diastolic CHF, chronic 04/29/2021    Frequent UTI     Metabolic acidosis 04/29/2021    Severe malnutrition 04/30/2021    Hydronephrosis 12/16/2021    Generalized weakness 01/28/2022    COVID-19 virus detected 01/28/2022    Hypocalcemia 01/28/2022    Morbid obesity with BMI of 50.0-59.9, adult 01/28/2022    ESRD (end stage renal disease) 01/28/2022    Pancytopenia due to chemotherapy 01/28/2022    Chronic anemia 08/16/2022    Rectal bleeding 08/16/2022    Bicuspid aortic valve 08/18/2022    Complicated UTI (urinary tract infection) 01/04/2023    Acute UTI (urinary tract infection) 01/04/2023    Severe aortic stenosis 01/04/2023    Combined systolic and diastolic congestive heart failure 01/04/2023    Depression 01/04/2023    ANDREW (obstructive sleep apnea) 01/04/2023    Diastolic CHF, chronic 01/05/2023    Adverse effect of chemotherapy 01/05/2023    UTI (urinary tract infection) 01/30/2023    Hematuria 04/13/2023    Dysuria 07/19/2023     Resolved Ambulatory Problems     Diagnosis Date Noted    Nonrheumatic aortic valve stenosis 08/16/2022    Peritoneal dialysis status 01/04/2023    Hypokalemia 01/04/2023     Past Medical History:   Diagnosis Date    Anemia     Breast cancer 2004    CHF (congestive heart failure)     CKD (chronic kidney disease)     COVID 01/2023    Diabetes mellitus     Dialysis patient     Elevated cholesterol     History of cancer chemotherapy 2005    History of hypertension 2023    History of kidney stones     History of MRSA infection 2005    History of radiation therapy     History of sepsis 2010    History of transfusion     Hyperthyroidism     Hypothyroidism     Kidney stone     Lymphedema of leg     Metastasis from breast cancer 2019    Neuromuscular disorder     Obesity     ANDREW on CPAP     Osteoarthrosis     Thickened endometrium     Urinary incontinence      Weakness          REVIEW OF SYSTEMS  All systems reviewed and negative except for those discussed in HPI.       PAST SURGICAL HISTORY  Past Surgical History:   Procedure Laterality Date    AORTIC VALVULOPLASTY  2023    ARTERIOVENOUS FISTULA/SHUNT SURGERY Left 2021    Procedure: LEFT  BRACHIOCEPALIC ARTERIOVENOUS FISTULA;  Surgeon: Dejuan Adler MD;  Location: [facility] OR;  Service: Vascular;  Laterality: Left;    BREAST RECONSTRUCTION  2004    WITH IMPLANTS, AND REVISION, NOW REMOVED    BREAST SURGERY Bilateral     breast implants removed and then replaced due to infection    CARDIAC CATHETERIZATION N/A 2022    Procedure: CORONARY ANGIOGRAPHY;  Surgeon: uLis Rivers MD;  Location:  [facility] CATH INVASIVE LOCATION;  Service: Cardiology;  Laterality: N/A;    CARDIAC CATHETERIZATION N/A 2022    Procedure: Right Heart Cath;  Surgeon: Luis Rivers MD;  Location: [facility] C[facility];  Service: Cardiology;  Laterality: N/A;    CARDIAC CATHETERIZATION N/A 01/10/2023    Procedure: Valvuloplasty;  Surgeon: Luis Rivers MD;  Location: [facility];  Service: Cardiovascular;  Laterality: N/A;  01/10/2023    CARDIAC CATHETERIZATION N/A 01/10/2023    Procedure: Aortic root aortogram;  Surgeon: Luis Rivers MD;  Location: [facility] C[facility];  Service: Cardiovascular;  Laterality: N/A;    CATARACT EXTRACTION WITH INTRAOCULAR LENS IMPLANT Bilateral      SECTION  1987     SECTION  1987    CYSTOSCOPY W/ URETERAL STENT PLACEMENT  2010    CYSTOSCOPY W/ URETERAL STENT PLACEMENT Left 2021    Procedure: CYSTOSCOPY KEFT RETROGRADE PYELOGRAM  LEFT  URETERAL STENT PLACEMENT;  Surgeon: Leodan Mckee Jr., MD;  Location: [facility] OR;  Service: Urology;  Laterality: Left;    CYSTOSCOPY W/ URETERAL STENT PLACEMENT Left 03/10/2022    Procedure: CYSTOSCOPY AND LEFT URETERAL STENT  EXCHANGE, RETROGRADE PYELOGRAM;  Surgeon: Leodan Mkcee Jr., MD;  Location: [facility] MAIN OR;  Service: Urology;  Laterality: Left;    CYSTOSCOPY W/ URETERAL STENT PLACEMENT Left 2023    Procedure: CYSTOSCOPY WITH LEFT URETERAL STENT PLACEMENT, RETROGRADE PYELOGRAM, CLOT EVACUATION, BLADDER FULGARATION;  Surgeon: Leodan Mckee Jr., MD;  Location: [facility] MAIN OR;  Service: Urology;  Laterality: Left;    D & C HYSTEROSCOPY N/A 2017    Procedure: DILATATION AND CURETTAGE, HYSTEROSCOPY ;  Surgeon: Cherie Oliveros MD;  Location:  [facility] OR [facility];  Service:     D & C HYSTEROSCOPY N/A 2019    Procedure: DILATATION AND CURETTAGE HYSTEROSCOPY/POLYPECTOMY;  Surgeon: Cherie Oliveros MD;  Location:  [facility];  Service: Gynecology    D & C HYSTEROSCOPY ENDOMETRIAL ABLATION N/A 2023    Procedure: DILATATION AND CURETTAGE;  Surgeon: Ina Marcos MD;  Location: [facility] MAIN OR;  Service: Obstetrics/Gynecology;  Laterality: N/A;    ENDOSCOPY      INSERTION AND REMOVAL HEMODIALYSIS CATHETER Right 2021    INSERTION HEMODIALYSIS CATHETER Right 2021    Procedure: HEMODIALYSIS CATHETER INSERTION;  Surgeon: Clint Hernández MD;  Location: [facility] MAIN OR;  Service: Vascular;  Laterality: Right;    LYMPH NODE BIOPSY      MASTECTOMY Bilateral 2004    VENOUS ACCESS DEVICE (PORT) INSERTION AND REMOVAL           FAMILY HISTORY  Family History   Problem Relation Age of Onset    Heart attack Mother     Coronary artery disease Mother         Mother  from MI at 72    Diabetes Mother     Breast cancer Mother     Hyperlipidemia Mother     Arthritis Mother     Cancer Mother     Heart attack Father     Aortic aneurysm Father         Father with ruptured thoracic aortic aneurysm    Coronary artery disease Father         Father  from MI at 74    Heart disease Father     Hyperlipidemia Father     Arthritis Father     Heart attack Maternal Grandmother     Coronary artery disease Maternal  Grandmother         STEPHANI deceeased from MI at age 76    Malig Hyperthermia Neg Hx          SOCIAL HISTORY  Social History     Socioeconomic History    Marital status:      Spouse name: Rk   Tobacco Use    Smoking status: Never    Smokeless tobacco: Never   Vaping Use    Vaping Use: Never used   Substance and Sexual Activity    Alcohol use: No    Drug use: Never    Sexual activity: Yes     Partners: Male     Birth control/protection: Post-menopausal         ALLERGIES  Patient has no known allergies.      PHYSICAL EXAM  ED Triage Vitals   Temp Heart Rate Resp BP SpO2   09/28/23 1148 09/28/23 1148 09/28/23 1148 09/28/23 1159 09/28/23 1148   97.5 °F (36.4 °C) 100 18 115/51 94 %      Temp src Heart Rate Source Patient Position BP Location FiO2 (%)   -- -- -- -- --              Physical Exam      GENERAL: 62-year-old female who appears in mild distress distress  HENT: NCAT: nares patent: Neck supple dry mucous membranes  EYES: no scleral icterus  CV: regular rhythm, normal rate at 100  RESPIRATORY: normal effort  ABDOMEN: soft, NTND: Bowel sounds diminished  MUSCULOSKELETAL: no deformity  NEURO: alert with nonfocal neuro exam  PSYCH:  calm, cooperative  SKIN: warm, dry    Vital signs and nursing notes reviewed.      LAB RESULTS  Recent Results (from the past 24 hour(s))   Comprehensive Metabolic Panel    Collection Time: 09/28/23  1:41 PM    Specimen: Blood   Result Value Ref Range    Glucose 178 (H) 65 - 99 mg/dL    BUN 26 (H) 8 - 23 mg/dL    Creatinine 6.85 (H) 0.57 - 1.00 mg/dL    Sodium 137 136 - 145 mmol/L    Potassium 2.8 (L) 3.5 - 5.2 mmol/L    Chloride 95 (L) 98 - 107 mmol/L    CO2 27.0 22.0 - 29.0 mmol/L    Calcium 8.7 8.6 - 10.5 mg/dL    Total Protein 6.9 6.0 - 8.5 g/dL    Albumin 2.7 (L) 3.5 - 5.2 g/dL    ALT (SGPT) 11 1 - 33 U/L    AST (SGOT) 12 1 - 32 U/L    Alkaline Phosphatase 104 39 - 117 U/L    Total Bilirubin 0.6 0.0 - 1.2 mg/dL    Globulin 4.2 gm/dL    A/G Ratio 0.6 g/dL    BUN/Creatinine  Ratio 3.8 (L) 7.0 - 25.0    Anion Gap 15.0 5.0 - 15.0 mmol/L    eGFR 6.3 (L) >60.0 mL/min/1.73   Protime-INR    Collection Time: 09/28/23  1:41 PM    Specimen: Blood   Result Value Ref Range    Protime 16.3 (H) 11.7 - 14.2 Seconds    INR 1.29 (H) 0.90 - 1.10   Lipase    Collection Time: 09/28/23  1:41 PM    Specimen: Blood   Result Value Ref Range    Lipase 22 13 - 60 U/L   Lactic Acid, Plasma    Collection Time: 09/28/23  1:41 PM    Specimen: Blood   Result Value Ref Range    Lactate 1.2 0.5 - 2.0 mmol/L   Procalcitonin    Collection Time: 09/28/23  1:41 PM    Specimen: Blood   Result Value Ref Range    Procalcitonin 0.57 (H) 0.00 - 0.25 ng/mL   CBC Auto Differential    Collection Time: 09/28/23  1:41 PM    Specimen: Blood   Result Value Ref Range    WBC 2.64 (L) 3.40 - 10.80 10*3/mm3    RBC 2.25 (L) 3.77 - 5.28 10*6/mm3    Hemoglobin 7.4 (L) 12.0 - 15.9 g/dL    Hematocrit 22.6 (L) 34.0 - 46.6 %    .4 (H) 79.0 - 97.0 fL    MCH 32.9 26.6 - 33.0 pg    MCHC 32.7 31.5 - 35.7 g/dL    RDW 15.7 (H) 12.3 - 15.4 %    RDW-SD 57.6 (H) 37.0 - 54.0 fl    MPV 9.7 6.0 - 12.0 fL    Platelets 110 (L) 140 - 450 10*3/mm3    Neutrophil % 82.3 (H) 42.7 - 76.0 %    Lymphocyte % 9.8 (L) 19.6 - 45.3 %    Monocyte % 5.3 5.0 - 12.0 %    Eosinophil % 1.1 0.3 - 6.2 %    Basophil % 1.1 0.0 - 1.5 %    Immature Grans % 0.4 0.0 - 0.5 %    Neutrophils, Absolute 2.17 1.70 - 7.00 10*3/mm3    Lymphocytes, Absolute 0.26 (L) 0.70 - 3.10 10*3/mm3    Monocytes, Absolute 0.14 0.10 - 0.90 10*3/mm3    Eosinophils, Absolute 0.03 0.00 - 0.40 10*3/mm3    Basophils, Absolute 0.03 0.00 - 0.20 10*3/mm3    Immature Grans, Absolute 0.01 0.00 - 0.05 10*3/mm3    nRBC 0.0 0.0 - 0.2 /100 WBC   Magnesium    Collection Time: 09/28/23  5:11 PM    Specimen: Blood   Result Value Ref Range    Magnesium 1.9 1.6 - 2.4 mg/dL       Ordered the above labs and reviewed the results.        RADIOLOGY  CT Abdomen Pelvis Without Contrast    Result Date: 9/28/2023  Examination:  CT of the abdomen and pelvis without contrast  Technique: CT of the abdomen and pelvis without contrast per protocol. Radiation dose reduction techniques were utilized, including automated exposure control and exposure modulation based on body size.   History: Abdominal pain, cancer, and diarrhea  Comparison: PET/CT of 10/6/2022.  Bone windows demonstrate degenerative changes, without suspicious osseous lesion seen.      Large amount of ascites. Incidental findings as above.  Findings: Limited evaluation of the inferior thorax demonstrates atelectasis, without consolidation, pleural effusion, or pneumothorax. The heart is mildly enlarged, without pericardial effusion. Mitral annular calcifications are seen.  There is a large amount of ascites. The kidneys are atrophic. There is a left-sided double-J ureteral stent.  The liver, spleen, adrenal glands, pancreas, stomach, small bowel, large bowel, uterus, and abdominal vasculature are normal as evaluated on this noncontrast examination. No intraperitoneal free gas is seen. No enlarged lymph nodes are demonstrated. Bilateral external iliac chain lymph nodes are smaller, measuring up to 9 mm in short axis diameter.  Bone windows demonstrate degenerative changes, without suspicious osseous lesion seen.  IMPRESSION: 1. Large amount of ascites, new  2. Smaller external iliac chain lymph nodes  3. Incidental findings as above.  This report was finalized on 9/28/2023 3:19 PM by Dr. Ashish Farmer M.D.       Ordered the above noted radiological studies. Reviewed by me in PACS.            PROCEDURES  Critical Care  Performed by: Howard Guzman MD  Authorized by: Howard Guzman MD     Critical care provider statement:     Critical care time (minutes):  37    Critical care time was exclusive of:  Separately billable procedures and treating other patients    Critical care was necessary to treat or prevent imminent or life-threatening deterioration of the following conditions:   Renal failure    Critical care was time spent personally by me on the following activities:  Discussions with consultants, discussions with primary provider, evaluation of patient's response to treatment, examination of patient, obtaining history from patient or surrogate, ordering and performing treatments and interventions, ordering and review of laboratory studies, ordering and review of radiographic studies, pulse oximetry, re-evaluation of patient's condition and review of old charts    I assumed direction of critical care for this patient from another provider in my specialty: no      Care discussed with: admitting provider            MEDICATIONS GIVEN IN ER  Medications   lactated ringers infusion (125 mL/hr Intravenous New Bag 9/28/23 1742)   lactated ringers bolus 500 mL (0 mL Intravenous Stopped 9/28/23 1430)   ondansetron (ZOFRAN) injection 4 mg (4 mg Intravenous Given 9/28/23 1404)   prochlorperazine (COMPAZINE) injection 10 mg (10 mg Intravenous Given 9/28/23 1751)   diphenhydrAMINE (BENADRYL) injection 25 mg (25 mg Intravenous Given 9/28/23 1743)   potassium chloride 10 mEq in 100 mL IVPB (10 mEq Intravenous New Bag 9/28/23 1755)             MEDICAL DECISION MAKING, PROGRESS, and CONSULTS    All labs have been independently reviewed by me.  All radiology studies have been reviewed by me and I have also reviewed the radiology report.   EKG's independently viewed and interpreted by me.  Discussion below represents my analysis of pertinent findings related to patient's condition, differential diagnosis, treatment plan and final disposition.      Additional sources:  - Discussed/ obtained information from independent historians: Patient's  is here and states that she has had repetitive vomiting despite the Zofran and has not been able to keep down her oral vancomycin oral potassium    - External (non-ED) record review: Yesterday the patient had labs that showed a negative GI panel, negative  respiratory panel but positive C. difficile.  I reviewed a note from her oncologist-Dr. Irvin who called her and oral vancomycin since she previously had responded to this for C. difficile in the past    - Chronic or social conditions impacting care: Patient lives with  and does in-home hemodialysis 5 days a week    - Shared decision making: After shared decision-making discussion to myself, the patient and her  we agree she needs to admitted to the hospital for further evaluation and care      Orders placed during this visit:  Orders Placed This Encounter   Procedures    Blood Culture - Blood,    Blood Culture - Blood,    CT Abdomen Pelvis Without Contrast    Comprehensive Metabolic Panel    Protime-INR    Lipase    Urinalysis With Microscopic If Indicated (No Culture) - Urine, Clean Catch    Lactic Acid, Plasma    Procalcitonin    CBC Auto Differential    Magnesium    Hematology and Oncology (on-call MD unless specified)    Nephrology (on -call MD unless specified)    [facility] (on-call MD unless specified) Details    Family Medicine Consult    Inpatient Admission    CBC & Differential         Differential diagnosis:  My differential diagnosis includes but is not limited to gastritis, pancreatitis, cholecystitis, appendicitis, diverticulitis, urinary tract infection, kidney stone, or bowel obstruction.        Independent interpretation of labs, radiology studies, and discussions with consultants:  ED Course as of 09/28/23 1908   Thu Sep 28, 2023   1256 I will treat the patient with IV fluids and IV antiemetics.  I will order labs including cultures and abdominal CT scan for further evaluation. [GP]   1301 I just spoke with our clinical pharmacist and the patient's C. difficile toxin was positive but her C. difficile antigen is negative.  This could indicate the patient just has colonization and not active disease.  I will check her labs and CT scan and then will consult ID prior to giving the patient  Flagyl or vancomycin especially since the patient states her diarrhea has improved. [GP]   1425 The patient is pancytopenic with a white count of 2.6, hemoglobin of 7.4 and platelets of 110.  This is similar to 1 month ago except her hemoglobin is gone from 8.1-7.4. [GP]   1626 Patient's creatinine is 6.8 and her potassium is 2.8. [GP]   1627 Patient's abdominal CT scan shows a large amount of new ascites but no signs of colitis. [GP]   1640 On repeat examination the patient states that she has vomited 5 times since the Zofran.  Thus I will give her Compazine and Benadryl.  I advised the patient of her ascites.  She states she has never had a paracentesis before.  I also advised her of her hypokalemia.  She states Dr. Ledesma called in potassium pills but she has not been able to keep them down.  We also discussed the issue of her C. difficile is possibly colonization since her CT scan does not show colitis.  I will consult the patient's oncologist, infectious disease and nephrologist.  I advised her we will need to admit her to the hospital.  She understands and agrees with the plan. [GP]   1703 I discussed the case with Dr. Rose from [facility].  She will admit the patient to a telemetry bed.  She is aware of the patient's vomiting and diarrhea with C. difficile studies that could be consistent with colonization.  She is aware of the patient's end-stage renal disease and hypokalemia.  I advised her that I will consult nephrology, hematology and ID. [GP]   1707 I just discussed the case with oncology who states they will consult.  They will likely order paracentesis for tomorrow. [GP]   5727 I discussed the case with Dr. Lozano from infectious disease.  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 difficile infection. [GP]   1929 I am still awaiting nephrology callback  but will give the patient 1 potassium run while awaiting her bed. [GP]   1907 I discussed the case with Dr. Rowley from nephrology.  He is aware of the patient's end-stage renal disease and that she performs at home hemodialysis 5 days a week.  We reviewed the patient's chemistries and her hypokalemia.  He is aware that I have given her 1 dose of IV potassium.  He will consult. [GP]      ED Course User Index  [GP] Howard Guzman MD               DIAGNOSIS  Final diagnoses:   Vomiting and diarrhea   End stage renal disease on dialysis   Hypokalemia   Malignant ascites   Pancytopenia   Primary malignant neoplasm of breast with metastasis   Generalized weakness         DISPOSITION  ADMISSION    Discussed treatment plan and reason for admission with pt/family and admitting physician.  Pt/family voiced understanding of the plan for admission for further testing/treatment as needed.            Latest Documented Vital Signs:  As of 19:08 EDT  BP- 118/47 HR- 75 Temp- 97.5 °F (36.4 °C) O2 sat- 90%--      --------------------  Please note that portions of this were completed with a voice recognition program.       Note Disclaimer: At [facility], we believe that sharing information builds trust and better relationships. You are receiving this note because you are receiving care at [facility] or recently visited. It is possible you will see health information before a provider has talked with you about it. This kind of information can be easy to misunderstand. To help you fully understand what it means for your health, we urge you to discuss this note with your provider.             Howard Guzman MD  09/28/23 2169       Howard Guzman MD  09/28/23 1909

## 2023-09-28 NOTE — TELEPHONE ENCOUNTER
Provider: Dr Irvin  Caller: Juana Hall  Relationship to Patient: Self  Call Back Phone Number: [phone]  Reason for Call: Pt is throwing up bile, said she spoke to Dr. Irvin yesterday

## 2023-09-29 ENCOUNTER — APPOINTMENT (OUTPATIENT)
Dept: ULTRASOUND IMAGING | Facility: HOSPITAL | Age: 63
DRG: 391 | End: 2023-09-29
Payer: COMMERCIAL

## 2023-09-29 LAB
ABO GROUP BLD: NORMAL
ANION GAP SERPL CALCULATED.3IONS-SCNC: 13 MMOL/L (ref 5–15)
BLD GP AB SCN SERPL QL: NEGATIVE
BUN SERPL-MCNC: 30 MG/DL (ref 8–23)
BUN/CREAT SERPL: 4.2 (ref 7–25)
CALCIUM SPEC-SCNC: 8.2 MG/DL (ref 8.6–10.5)
CHLORIDE SERPL-SCNC: 97 MMOL/L (ref 98–107)
CO2 SERPL-SCNC: 25 MMOL/L (ref 22–29)
CREAT SERPL-MCNC: 7.18 MG/DL (ref 0.57–1)
DEPRECATED RDW RBC AUTO: 55.3 FL (ref 37–54)
EGFRCR SERPLBLD CKD-EPI 2021: 5.9 ML/MIN/1.73
ERYTHROCYTE [DISTWIDTH] IN BLOOD BY AUTOMATED COUNT: 15.6 % (ref 12.3–15.4)
GLUCOSE BLDC GLUCOMTR-MCNC: 130 MG/DL (ref 70–130)
GLUCOSE BLDC GLUCOMTR-MCNC: 148 MG/DL (ref 70–130)
GLUCOSE BLDC GLUCOMTR-MCNC: 151 MG/DL (ref 70–130)
GLUCOSE BLDC GLUCOMTR-MCNC: 160 MG/DL (ref 70–130)
GLUCOSE SERPL-MCNC: 148 MG/DL (ref 65–99)
HBV SURFACE AG SERPL QL IA: NORMAL
HCT VFR BLD AUTO: 20.4 % (ref 34–46.6)
HGB BLD-MCNC: 6.8 G/DL (ref 12–15.9)
MCH RBC QN AUTO: 32.4 PG (ref 26.6–33)
MCHC RBC AUTO-ENTMCNC: 33.3 G/DL (ref 31.5–35.7)
MCV RBC AUTO: 97.1 FL (ref 79–97)
PLATELET # BLD AUTO: 101 10*3/MM3 (ref 140–450)
PMV BLD AUTO: 9.8 FL (ref 6–12)
POTASSIUM SERPL-SCNC: 2.7 MMOL/L (ref 3.5–5.2)
QT INTERVAL: 426 MS
QTC INTERVAL: 504 MS
RBC # BLD AUTO: 2.1 10*6/MM3 (ref 3.77–5.28)
RH BLD: POSITIVE
SODIUM SERPL-SCNC: 135 MMOL/L (ref 136–145)
T&S EXPIRATION DATE: NORMAL
WBC NRBC COR # BLD: 2.68 10*3/MM3 (ref 3.4–10.8)

## 2023-09-29 PROCEDURE — 86920 COMPATIBILITY TEST SPIN: CPT

## 2023-09-29 PROCEDURE — 86850 RBC ANTIBODY SCREEN: CPT | Performed by: INTERNAL MEDICINE

## 2023-09-29 PROCEDURE — 93005 ELECTROCARDIOGRAM TRACING: CPT | Performed by: INTERNAL MEDICINE

## 2023-09-29 PROCEDURE — 86902 BLOOD TYPE ANTIGEN DONOR EA: CPT

## 2023-09-29 PROCEDURE — 87340 HEPATITIS B SURFACE AG IA: CPT | Performed by: STUDENT IN AN ORGANIZED HEALTH CARE EDUCATION/TRAINING PROGRAM

## 2023-09-29 PROCEDURE — 25010000002 PROCHLORPERAZINE 10 MG/2ML SOLUTION: Performed by: INTERNAL MEDICINE

## 2023-09-29 PROCEDURE — 25010000002 ONDANSETRON PER 1 MG: Performed by: INTERNAL MEDICINE

## 2023-09-29 PROCEDURE — 99222 1ST HOSP IP/OBS MODERATE 55: CPT | Performed by: INTERNAL MEDICINE

## 2023-09-29 PROCEDURE — 86900 BLOOD TYPING SEROLOGIC ABO: CPT

## 2023-09-29 PROCEDURE — 5A1D70Z PERFORMANCE OF URINARY FILTRATION, INTERMITTENT, LESS THAN 6 HOURS PER DAY: ICD-10-PCS | Performed by: INTERNAL MEDICINE

## 2023-09-29 PROCEDURE — 80048 BASIC METABOLIC PNL TOTAL CA: CPT | Performed by: INTERNAL MEDICINE

## 2023-09-29 PROCEDURE — 93010 ELECTROCARDIOGRAM REPORT: CPT | Performed by: INTERNAL MEDICINE

## 2023-09-29 PROCEDURE — P9016 RBC LEUKOCYTES REDUCED: HCPCS

## 2023-09-29 PROCEDURE — 63710000001 INSULIN LISPRO (HUMAN) PER 5 UNITS: Performed by: INTERNAL MEDICINE

## 2023-09-29 PROCEDURE — 86900 BLOOD TYPING SEROLOGIC ABO: CPT | Performed by: INTERNAL MEDICINE

## 2023-09-29 PROCEDURE — 86901 BLOOD TYPING SEROLOGIC RH(D): CPT | Performed by: INTERNAL MEDICINE

## 2023-09-29 PROCEDURE — 25010000002 DIPHENHYDRAMINE PER 50 MG: Performed by: STUDENT IN AN ORGANIZED HEALTH CARE EDUCATION/TRAINING PROGRAM

## 2023-09-29 PROCEDURE — 82948 REAGENT STRIP/BLOOD GLUCOSE: CPT

## 2023-09-29 PROCEDURE — 86922 COMPATIBILITY TEST ANTIGLOB: CPT

## 2023-09-29 PROCEDURE — 85027 COMPLETE CBC AUTOMATED: CPT | Performed by: INTERNAL MEDICINE

## 2023-09-29 RX ORDER — DIPHENHYDRAMINE HYDROCHLORIDE 50 MG/ML
25 INJECTION INTRAMUSCULAR; INTRAVENOUS ONCE
Status: COMPLETED | OUTPATIENT
Start: 2023-09-29 | End: 2023-09-29

## 2023-09-29 RX ORDER — POTASSIUM CHLORIDE 750 MG/1
40 TABLET, FILM COATED, EXTENDED RELEASE ORAL ONCE
Status: COMPLETED | OUTPATIENT
Start: 2023-09-29 | End: 2023-09-29

## 2023-09-29 RX ADMIN — ANORECTAL OINTMENT 1 APPLICATION: 15.7; .44; 24; 20.6 OINTMENT TOPICAL at 21:45

## 2023-09-29 RX ADMIN — LEVOTHYROXINE SODIUM 50 MCG: 50 TABLET ORAL at 06:18

## 2023-09-29 RX ADMIN — POTASSIUM CHLORIDE 40 MEQ: 750 TABLET, EXTENDED RELEASE ORAL at 09:57

## 2023-09-29 RX ADMIN — FOLIC ACID 1000 MCG: 1 TABLET ORAL at 09:57

## 2023-09-29 RX ADMIN — GABAPENTIN 300 MG: 300 CAPSULE ORAL at 21:45

## 2023-09-29 RX ADMIN — ANORECTAL OINTMENT 1 APPLICATION: 15.7; .44; 24; 20.6 OINTMENT TOPICAL at 14:57

## 2023-09-29 RX ADMIN — SERTRALINE 150 MG: 100 TABLET, FILM COATED ORAL at 09:57

## 2023-09-29 RX ADMIN — ONDANSETRON 4 MG: 2 INJECTION INTRAMUSCULAR; INTRAVENOUS at 19:06

## 2023-09-29 RX ADMIN — Medication 1000 MCG: at 09:57

## 2023-09-29 RX ADMIN — INSULIN LISPRO 2 UNITS: 100 INJECTION, SOLUTION INTRAVENOUS; SUBCUTANEOUS at 12:38

## 2023-09-29 RX ADMIN — ANORECTAL OINTMENT 1 APPLICATION: 15.7; .44; 24; 20.6 OINTMENT TOPICAL at 19:07

## 2023-09-29 RX ADMIN — SODIUM CHLORIDE 75 ML/HR: 9 INJECTION, SOLUTION INTRAVENOUS at 01:59

## 2023-09-29 RX ADMIN — PROCHLORPERAZINE EDISYLATE 10 MG: 5 INJECTION INTRAMUSCULAR; INTRAVENOUS at 21:45

## 2023-09-29 RX ADMIN — DIPHENHYDRAMINE HYDROCHLORIDE 25 MG: 50 INJECTION, SOLUTION INTRAMUSCULAR; INTRAVENOUS at 18:47

## 2023-09-29 NOTE — CASE MANAGEMENT/SOCIAL WORK
Discharge Planning Assessment  [facility]     Patient Name: Juana Hall  MRN: [id number]  Today's Date: 9/29/2023    Admit Date: 9/28/2023    Plan: Plan home with spouse.   JEFF Chapin RN   Discharge Needs Assessment       Row Name 09/29/23 0802       Living Environment    People in Home spouse    Name(s) of People in Home Spouse  ( Rk [phone])    Current Living Arrangements home    Potentially Unsafe Housing Conditions none    Primary Care Provided by self    Provides Primary Care For no one    Family Caregiver if Needed spouse    Family Caregiver Names Spouse ( Rk [phone])    Quality of Family Relationships involved;supportive    Able to Return to Prior Arrangements yes    Living Arrangement Comments Pt lives with her spouse ( Rk [phone]) in a single story house.       Resource/Environmental Concerns    Resource/Environmental Concerns none    Transportation Concerns none       Transition Planning    Patient/Family Anticipates Transition to home with family    Patient/Family Anticipated Services at Transition none    Transportation Anticipated family or friend will provide       Discharge Needs Assessment    Equipment Currently Used at Home cpap;cane, straight;glucometer;rollator;shower chair;other (see comments)  Electric scooter    Concerns to be Addressed no discharge needs identified;denies needs/concerns at this time    Anticipated Changes Related to Illness none    Equipment Needed After Discharge cpap;cane, straight;glucometer;rollator;shower chair;other (see comments)  Electric Scooter                   Discharge Plan       Row Name 09/29/23 0805       Plan    Plan Plan home with spouse.   JEFF Chapin RN    Patient/Family in Agreement with Plan yes    Plan Comments FACE SHEET VERIFIED.  Spoke with pt at bedside.  Pt's PCP is BETY Sarabia. Pt lives with her spouse ( Rk [phone]) in a single story house.  Pt is independent with ADLs.  Pt has a  C  PAP, cane, glucometer, rollator, shower chair, and electric scooter for home use if needed.  Pt gets her prescriptions at [facility]  Pt denies any issues affording medications. Pt is not current with HH.  Pt has not been in SNF.  Pt denies any discharge needs.  Pt's spouse will assist pt at home and transport pt home.  Plan home with spouse.   JEFF Chapin RN                  Continued Care and Services - Admitted Since 9/28/2023    Coordination has not been started for this encounter.       Selected Continued Care - Episodes Includes continued care and service providers with selected services from the active episodes listed below      Oncology Episode start date: 11/15/2021   There are no active outsourced providers for this episode.                    Demographic Summary       Row Name 09/29/23 0802       General Information    Admission Type inpatient    Arrived From emergency department    Referral Source admission list    Reason for Consult discharge planning    Preferred Language English                   Functional Status       Row Name 09/29/23 0802       Functional Status    Usual Activity Tolerance good    Current Activity Tolerance good       Functional Status, IADL    Medications independent    Meal Preparation independent    Housekeeping independent    Laundry independent    Shopping independent       Mental Status    General Appearance WDL WDL                   Psychosocial    No documentation.                  Abuse/Neglect    No documentation.                  Legal    No documentation.                  Substance Abuse    No documentation.                  Patient Forms    No documentation.                     Lashonda Chapin, TRISTAN

## 2023-09-29 NOTE — PROGRESS NOTES
Name: Juana Hall ADMIT: 2023   : 1960  PCP: Kiana Noriega APRN ([place])    MRN: [id number] LOS: 1 days   AGE/SEX: 62 y.o. female  ROOM: [address]   This is a 62-year-old woman with metastatic breast cancer, aortic stenosis status post valvuloplasty, congestive heart failure, ESRD on home hemodialysis who presents to the hospital after experiencing refractory diarrhea, thought to be secondary to recurrent C. difficile.  She underwent a CT of the abdomen pelvis that showed large amount of ascites.  Her C. difficile PCR was positive however the C. difficile antigen was negative.  In addition she did not exhibit any leukocytosis or fever.  Infectious disease was consulted and confirmed that she did not have active C. difficile but was instead a carrier.  In any case, her hemoglobin was 6.8 on the morning after admission.    Objective   Objective   Vital Signs  Temp:  [97.7 °F (36.5 °C)-98.2 °F (36.8 °C)] 98.1 °F (36.7 °C)  Heart Rate:  [75-87] 87  Resp:  [16] 16  BP: ()/(46-59) 94/56  SpO2:  [90 %-97 %] 94 %  on   ;   Device (Oxygen Therapy): room air  Body mass index is 59.39 kg/m².  Physical Exam  Constitutional:       General: She is not in acute distress.     Comments: Chronically ill appearing    HENT:      Head: Atraumatic.   Eyes:      Extraocular Movements: Extraocular movements intact.      Pupils: Pupils are equal, round, and reactive to light.   Cardiovascular:      Rate and Rhythm: Regular rhythm.   Abdominal:      General: There is distension.      Palpations: Abdomen is soft.      Tenderness: There is no abdominal tenderness.   Skin:     General: Skin is warm and dry.   Neurological:      General: No focal deficit present.      Mental Status: She is alert and oriented to person, place, and time.       Results Review     I reviewed the patient's new clinical results.  Results from last 7 days   Lab Units 23  0645 23  1341 23  1006   WBC 10*3/mm3  2.68* 2.64* 3.59   HEMOGLOBIN g/dL 6.8* 7.4* 8.1*   PLATELETS 10*3/mm3 101* 110* 121*     Results from last 7 days   Lab Units 09/29/23  0645 09/28/23  1341 09/26/23  1006   SODIUM mmol/L 135* 137 135*   POTASSIUM mmol/L 2.7* 2.8* 2.9*   CHLORIDE mmol/L 97* 95* 96*   CO2 mmol/L 25.0 27.0 21.6*   BUN mg/dL 30* 26* 34*   CREATININE mg/dL 7.18* 6.85* 7.74*   GLUCOSE mg/dL 148* 178* 117*   Estimated Creatinine Clearance: 13.2 mL/min (A) (by C-G formula based on SCr of 7.18 mg/dL (H)).  Results from last 7 days   Lab Units 09/28/23  1341 09/26/23  1006   ALBUMIN g/dL 2.7* 3.0*   BILIRUBIN mg/dL 0.6 0.7   ALK PHOS U/L 104 100   AST (SGOT) U/L 12 26   ALT (SGPT) U/L 11 6     Results from last 7 days   Lab Units 09/29/23  0645 09/28/23  1711 09/28/23  1341 09/26/23  1006   CALCIUM mg/dL 8.2*  --  8.7 8.5*   ALBUMIN g/dL  --   --  2.7* 3.0*   MAGNESIUM mg/dL  --  1.9  --   --      Results from last 7 days   Lab Units 09/28/23  1341   PROCALCITONIN ng/mL 0.57*   LACTATE mmol/L 1.2     COVID19   Date Value Ref Range Status   09/26/2023 Not Detected Not Detected - Ref. Range Final   01/04/2023 Not Detected Not Detected - Ref. Range Final     Glucose   Date/Time Value Ref Range Status   09/29/2023 1113 151 (H) 70 - 130 mg/dL Final   09/29/2023 0612 160 (H) 70 - 130 mg/dL Final     Results for orders placed or performed during the hospital encounter of 01/03/23   Blood Culture - Blood, Hand, Right    Specimen: Hand, Right; Blood   Result Value Ref Range    Blood Culture No growth at 5 days          CT Abdomen Pelvis Without Contrast  Narrative: Examination: CT of the abdomen and pelvis without contrast     Technique: CT of the abdomen and pelvis without contrast per protocol.  Radiation dose reduction techniques were utilized, including automated  exposure control and exposure modulation based on body size.        History: Abdominal pain, cancer, and diarrhea     Comparison: PET/CT of 10/6/2022.     Bone windows demonstrate  degenerative changes, without suspicious  osseous lesion seen.     Impression: Large amount of ascites. Incidental findings as above.     Findings: Limited evaluation of the inferior thorax demonstrates  atelectasis, without consolidation, pleural effusion, or pneumothorax.  The heart is mildly enlarged, without pericardial effusion. Mitral  annular calcifications are seen.     There is a large amount of ascites. The kidneys are atrophic. There is a  left-sided double-J ureteral stent.     The liver, spleen, adrenal glands, pancreas, stomach, small bowel, large  bowel, uterus, and abdominal vasculature are normal as evaluated on this  noncontrast examination. No intraperitoneal free gas is seen. No  enlarged lymph nodes are demonstrated. Bilateral external iliac chain  lymph nodes are smaller, measuring up to 9 mm in short axis diameter.     Bone windows demonstrate degenerative changes, without suspicious  osseous lesion seen.     IMPRESSION:  1. Large amount of ascites, new     2. Smaller external iliac chain lymph nodes     3. Incidental findings as above.     This report was finalized on 9/28/2023 3:19 PM by Dr. Ashish Farmer M.D.       Scheduled Medications  diphenhydrAMINE, 25 mg, Intravenous, Once  folic acid, 1,000 mcg, Oral, Daily  gabapentin, 300 mg, Oral, Nightly  insulin lispro, 2-7 Units, Subcutaneous, 4x Daily AC & at Bedtime  levothyroxine, 50 mcg, Oral, Q AM  Menthol-Zinc Oxide, 1 application , Topical, 4x Daily  senna-docusate sodium, 2 tablet, Oral, BID  sertraline, 150 mg, Oral, Daily  vitamin B-12, 1,000 mcg, Oral, Daily    Infusions   Diet  Diet: Cardiac Diets; Healthy Heart (2-3 Na+); Texture: Regular Texture (IDDSI 7); Fluid Consistency: Thin (IDDSI 0)       Assessment/Plan     Active Hospital Problems    Diagnosis  POA    **Intractable vomiting [R11.10]  Yes      Resolved Hospital Problems   No resolved problems to display.       62 y.o. female admitted with Intractable  vomiting.    Diarrhea  Not due to C. difficile.  PCR post positive as antigen was negative.  GI panel PCR was also unremarkable.    Ascites  This is a new occurrence.  She has never had ascites before  We will obtain ultrasound guided paracentesis with fluid studies.    End-stage renal failure  Hemodialysis Monday through Friday at home.  Nephrology has been consulted and she will undergo hemodialysis today    Anemia  Multifactorial from renal failure as well as ongoing chemotherapy  Administer 1 unit of PRBCs with dialysis    Atrial fibrillation  New onset  Cardiology consulted.  Rate well controlled without any AV varun blocking agents.  Anticoagulation not currently recommended in the setting of anemia and ongoing chemotherapy      SCDs for DVT prophylaxis.  Full code.  Discussed with patient and nursing staff.  Anticipate discharge home tomorrow.      Jason Hernandez MD  [facility]  09/29/23  14:08 EDT    Copied text on this note has been reviewed and updated as of 09/29/23

## 2023-09-29 NOTE — NURSING NOTE
09/29/23 1031   Wound 09/28/23 2257 Left posterior thigh   Placement Date/Time: 09/28/23 2257   Present on Hospital Admission: No  Side: Left  Orientation: posterior  Location: thigh   Base clean;dermis;moist;pink  (friction injury from pt's pads and briefs rubbing on her thigh. Pt reports this happened at home and happens frequently. She uses butt paste to heal at home.)   Periwound intact;dry;pink   Drainage Amount none   Dressing Care open to air  (Calmoseptine ointment ordered for pt.)   Skin Interventions   Pressure Reduction Devices alternating pressure pump (ADD);pressure-redistributing mattress utilized  (pt able to turn and reposition self)     CWON note: pt seen for evaluation of left posterior/medial thigh friction injury POA. Pt is alert and oriented, able to turn and reposition self in bed. She reports her pads and briefs rub on her skin causing these partial thickness wounds to occur, she uses butt paste at home to heal them. I have ordered some Calmoseptine ointment to be used for protection and staff should avoid placing briefs on the injured area. Wound care orders placed into [facility]. Please re-consult for any additional needs.

## 2023-09-29 NOTE — PLAN OF CARE
Goal Outcome Evaluation:  Plan of Care Reviewed With: patient        Progress: no change  Outcome Evaluation: Patient  admitted  this    shift  with  intractable  vomiting.  Alert  and oriented.  No  complaints of pain.  No  nausea  or  vomiting noted  at this  time.  Patient  went  into  new  onset  Afib,  Cardiology  consult  called  to  see this am,   New  ID  and  Hemoncology  consults  also  noted.    SR/AFib  on the  monitor. CPAP  in  use.  Nursing  will  continue to monitor

## 2023-09-29 NOTE — CONSULTS
[facility] GROUP INITIAL INPATIENT CONSULTATION NOTE    REASON FOR CONSULTATION:    Metastatic lobular breast cancer (ER/RI positive, HER-2/tj negative), anemia secondary to CKD3, CHF, peripheral neuropathy, chemotherapy related nausea chemotherapy-induced myelosuppression     HISTORY OF PRESENT ILLNESS:  Juana Hall is a 62 y.o. female who we are asked to see today in consultation for metastatic breast cancer, nausea and vomiting    The patient has a past medical history of metastatic breast cancer on therapy for many years followed by Dr. Irvin.  She has end-stage renal disease on hemodialysis which she now does at home 5 days/week.    The patient presented with persistent nausea and vomiting.  Zofran at home was not helping.       She received a dose of Compazine yesterday and nausea is much better.  She still has a decreased appetite and is afraid to eat much.    She has noticed some abdominal distention.  She is having bowel movements.    CT imaging shows ascites.  Paracentesis is planned.    Past Medical History:   Diagnosis Date    Anemia     Anxiety and depression     Bicuspid aortic valve     had surgery    Breast cancer 2004    RIGHT, HAD NEELA. MASTECTOMY, CHEMO AND RADIATION    CHF (congestive heart failure)     CKD (chronic kidney disease)     dialysis    COVID 01/2023    Diabetes mellitus     Dialysis patient     pt does at home    Elevated cholesterol     Frequent UTI     last 6 months    Heart murmur     History of cancer chemotherapy 2005    History of hypertension 2023    due to low b/p was taken off meds    History of kidney stones     History of MRSA infection 2005    POST BREAST SURGERY-TREATED [facility]    History of radiation therapy     2 times [id number] for breast cancer   and 2019 for omentum cancer    History of sepsis 2010    KIDNEY STONE    History of transfusion     no reaction    Hyperlipidemia     Hyperthyroidism     Hypothyroidism     Kidney stone     CURRENT LEFT-DEC 2021     Lymphedema of leg     LEFT    Metastasis from breast cancer     STOMACH-OMENTUM    Neuromuscular disorder     Obesity     ANDREW on CPAP     CPAP    Osteoarthrosis     Peripheral neuropathy     FEET BILAT    Radiculopathy     Rectal bleeding 2022    Thickened endometrium     Urinary incontinence     wears pads    Weakness     BILAT LEGS-WITH ANY AMT OF TIME       Past Surgical History:   Procedure Laterality Date    AORTIC VALVULOPLASTY  2023    ARTERIOVENOUS FISTULA/SHUNT SURGERY Left 2021    Procedure: LEFT  BRACHIOCEPALIC ARTERIOVENOUS FISTULA;  Surgeon: Dejuan Adler MD;  Location: [facility] MAIN OR;  Service: Vascular;  Laterality: Left;    BREAST RECONSTRUCTION      WITH IMPLANTS, AND REVISION, NOW REMOVED    BREAST SURGERY Bilateral     breast implants removed and then replaced due to infection    CARDIAC CATHETERIZATION N/A 2022    Procedure: CORONARY ANGIOGRAPHY;  Surgeon: Luis Rivers MD;  Location:  [facility] CATH INVASIVE LOCATION;  Service: Cardiology;  Laterality: N/A;    CARDIAC CATHETERIZATION N/A 2022    Procedure: Right Heart Cath;  Surgeon: Luis Rivers MD;  [facility];  Service: Cardiology;  Laterality: N/A;    CARDIAC CATHETERIZATION N/A 01/10/2023    Procedure: Valvuloplasty;  Surgeon: Luis Rivers MD;  Location:  [facility] CATH INVASIVE LOCATION;  Service: Cardiovascular;  Laterality: N/A;  01/10/2023    CARDIAC CATHETERIZATION N/A 01/10/2023    Procedure: Aortic root aortogram;  Surgeon: Luis Rivers MD;  [facility];  Service: Cardiovascular;  Laterality: N/A;    CATARACT EXTRACTION WITH INTRAOCULAR LENS IMPLANT Bilateral      SECTION  1987     SECTION  1987    CYSTOSCOPY W/ URETERAL STENT PLACEMENT  2010    CYSTOSCOPY W/ URETERAL STENT PLACEMENT Left 2021    Procedure: CYSTOSCOPY KEFT RETROGRADE PYELOGRAM  LEFT   URETERAL STENT PLACEMENT;  Surgeon: Leodan Mckee Jr., MD;  Location: [facility] MAIN OR;  Service: Urology;  Laterality: Left;    CYSTOSCOPY W/ URETERAL STENT PLACEMENT Left 03/10/2022    Procedure: CYSTOSCOPY AND LEFT URETERAL STENT EXCHANGE, RETROGRADE PYELOGRAM;  Surgeon: Leodan Mckee Jr., MD;  Location: [facility] MAIN OR;  Service: Urology;  Laterality: Left;    CYSTOSCOPY W/ URETERAL STENT PLACEMENT Left 5/5/2023    Procedure: CYSTOSCOPY WITH LEFT URETERAL STENT PLACEMENT, RETROGRADE PYELOGRAM, CLOT EVACUATION, BLADDER FULGARATION;  Surgeon: Leodan Mckee Jr., MD;  Location: [facility] MAIN OR;  Service: Urology;  Laterality: Left;    D & C HYSTEROSCOPY N/A 05/22/2017    Procedure: DILATATION AND CURETTAGE, HYSTEROSCOPY ;  Surgeon: Cherie Oliveros MD;  [facility];  Service:     D & C HYSTEROSCOPY N/A 09/19/2019    Procedure: DILATATION AND CURETTAGE HYSTEROSCOPY/POLYPECTOMY;  Surgeon: Cherie Oliveros MD;  [facility];  Service: Gynecology    D & C HYSTEROSCOPY ENDOMETRIAL ABLATION N/A 5/5/2023    Procedure: DILATATION AND CURETTAGE;  Surgeon: Ina Marcos MD;  Location: [facility] OR;  Service: Obstetrics/Gynecology;  Laterality: N/A;    ENDOSCOPY      INSERTION AND REMOVAL HEMODIALYSIS CATHETER Right 05/01/2021    INSERTION HEMODIALYSIS CATHETER Right 05/01/2021    Procedure: HEMODIALYSIS CATHETER INSERTION;  Surgeon: Clint Hernández MD;  Location: [facility] OR;  Service: Vascular;  Laterality: Right;    LYMPH NODE BIOPSY      MASTECTOMY Bilateral 2004    VENOUS ACCESS DEVICE (PORT) INSERTION AND REMOVAL         SOCIAL HISTORY:   reports that she has never smoked. She has never used smokeless tobacco. She reports that she does not drink alcohol and does not use drugs.    FAMILY HISTORY:  family history includes Aortic aneurysm in her father; Arthritis in her father and mother; Breast cancer in her mother; Cancer in her mother; Coronary artery disease in her father,  "maternal grandmother, and mother; Diabetes in her mother; Heart attack in her father, maternal grandmother, and mother; Heart disease in her father; Hyperlipidemia in her father and mother.    ALLERGIES:  No Known Allergies    MEDICATIONS:  As listed in the electronic medical record.    Review of Systems   Gastrointestinal:  Positive for abdominal distention, nausea and vomiting (Improved).   All other systems reviewed and are negative.    Vitals:    09/28/23 2046 09/28/23 2311 09/29/23 0323 09/29/23 0715   BP: 118/53 103/46 101/54 94/56   BP Location: Right arm Right arm Right arm Right arm   Patient Position: Lying Lying Lying Lying   Pulse: 82 79 78 87   Resp: 16 16 16 16   Temp: 98.1 °F (36.7 °C) 98.2 °F (36.8 °C) 97.7 °F (36.5 °C) 98.1 °F (36.7 °C)   TempSrc: Oral Oral Oral Oral   SpO2: 92% 97% 94% 94%   Weight: (!) 172 kg (380 lb)  (!) 172 kg (379 lb 3.1 oz)    Height: 170.2 cm (67\")          Physical Exam  Vitals reviewed.   Constitutional:       Appearance: She is well-developed.   HENT:      Head: Normocephalic and atraumatic.      Nose: Nose normal.   Eyes:      Conjunctiva/sclera: Conjunctivae normal.      Pupils: Pupils are equal, round, and reactive to light.   Cardiovascular:      Rate and Rhythm: Normal rate and regular rhythm.      Heart sounds: Normal heart sounds, S1 normal and S2 normal. No murmur heard.    No friction rub. No gallop.   Pulmonary:      Effort: Pulmonary effort is normal. No respiratory distress.      Breath sounds: Normal breath sounds. No stridor. No wheezing, rhonchi or rales.   Chest:      Chest wall: No tenderness.   Abdominal:      General: Bowel sounds are normal. There is distension.      Palpations: Abdomen is soft. There is no mass.      Tenderness: There is no abdominal tenderness. There is no guarding or rebound.   Musculoskeletal:         General: Normal range of motion.      Cervical back: Neck supple.      Right lower leg: Edema present.      Left lower leg: Edema " present.   Lymphadenopathy:      Cervical: No cervical adenopathy.      Upper Body:      Right upper body: No supraclavicular adenopathy.      Left upper body: No supraclavicular adenopathy.   Skin:     General: Skin is warm and dry.      Findings: No erythema or rash.   Neurological:      Mental Status: She is alert and oriented to person, place, and time.      Cranial Nerves: No cranial nerve deficit.      Sensory: No sensory deficit.   Psychiatric:         Behavior: Behavior normal.         Thought Content: Thought content normal.         Judgment: Judgment normal.       DIAGNOSTIC DATA:  Results from last 7 days   Lab Units 09/29/23  0645   WBC 10*3/mm3 2.68*   HEMOGLOBIN g/dL 6.8*   HEMATOCRIT % 20.4*   PLATELETS 10*3/mm3 101*     Lab Results   Component Value Date    NEUTROABS 2.17 09/28/2023     Results from last 7 days   Lab Units 09/29/23  0645   SODIUM mmol/L 135*   POTASSIUM mmol/L 2.7*   CHLORIDE mmol/L 97*   CO2 mmol/L 25.0   BUN mg/dL 30*   CREATININE mg/dL 7.18*   GLUCOSE mg/dL 148*   CALCIUM mg/dL 8.2*     Results from last 7 days   Lab Units 09/28/23  1341   INR  1.29*     Results from last 7 days   Lab Units 09/28/23  1711   MAGNESIUM mg/dL 1.9       IMAGING:      CT Abdomen Pelvis Without Contrast (09/28/2023 15:07)   CT abdomen/pelvis images reviewed. Large amount of ascites, new. Adenopathy improved. No other findings.     ASSESSMENT:  This is a 62 y.o. female with:    *Metastatic lobular breast cancer (ER/AR positive, HER-2/tj negative):  Previous stage IIIC right breast cancer in 2003, received neoadjuvant chemotherapy (unknown regimen) with subsequent bilateral mastectomies 1/22/2004 with right axillary dissection.  Residual 10-12 cm invasive lobular carcinoma on the right breast with 14/16+ nodes with extranodal extension.  Received adjuvant carboplatin and Navelbine chemotherapy x4 cycles  Attempted adjuvant radiation to the chest wall however interrupted due to cellulitis that required  removal of implants in August 2004  Received tamoxifen from 6/22/2004 until June 2009.  Transitioned to Femara and received until June 2014  Identification of CT findings concerning for carcinomatosis on CT scans and June 2019.  PET scan confirmed hypermetabolic activity in the omentum as well as small retroperitoneal lymph nodes.  CT-guided omental biopsy 7/30/2019 with metastatic lobular carcinoma of breast primary, ER positive (greater than 95%), VT positive (90%), HER-2/tj negative (1+ IHC)  [facility] medicine liquid biopsy 2/5/2020: MSI undetermined, mutations in BETH, NF1, CHEK2.  No evidence of PIK3CA mutation.  Subsequent [facility] germline testing 11/12/2021 with BETH VUS (c.2864C>A) and POLE VUS (c.631A>T)  Initiation of Aromasin and Ibrance in August 2019  Difficulty with myelosuppression related to Ibrance requiring dose alterations, eventually changed to 100 mg for 7 days on followed by 7 days off.  CT chest abdomen pelvis 10/26/2021 with increase in left hydronephrosis with increase in left perinephric and periureteral stranding. Decrease in stone in the left kidney lower pole (7 mm).  Stable small retroperitoneal lymph and left periaortic lymph nodes.  PET scan 11/10/2021 with multiple bilateral hypermetabolic nodular pulmonary lesions, asymmetric moderate to severe left hydronephrosis with ill-defined fat stranding and soft tissue thickening in the renal sinus and surrounding the left ureter, hypermetabolic left retroperitoneal lymph node with slight increase in size, ill-defined hypermetabolic thickening in the inferior omentum with increase in size, uptake and C7 (indeterminate, recommended MRI).  Short segments of uptake in the mid and distal esophagus possibly related to gastritis.  PET scan findings felt to be consistent with progressive malignancy.  Patient declined repeat omental biopsy.   Options for further treatment discussed on 11/12/2021.  In light of renal failure and dialysis, options are  more limited.  Recommendation to continue Ibrance and discontinue Aromasin, begin Faslodex.  Discussed possible future use of single agent Taxol.    Aromasin discontinued 11/12/2021  On 11/22/2021 initiation of Faslodex with continuation of Ibrance (100 mg daily for 7 days on followed by 7 days off).  MRI cervical spine 11/18/2021 showed the right C7 normality to likely represent metastatic disease.  With solitary bone lesion, did not recommend pursuit of bone modifying agent.  Following 2 cycles Faslodex/Ibrance, PET scan on 1/11/2022 showed no change in the soft tissue superior to the dome of the bladder with hypermetabolic activity (likely related to carcinomatosis).  Significant decrease in injection of fat around the left renal pelvis and stable retroperitoneal hypermetabolic lymph nodes, decrease in size and activity in small bilateral pulmonary nodules.  Findings felt to represent evidence of response to treatment.    Ibrance held briefly beginning 1/28/2022 due to hospitalization with COVID-19 infection and C. difficile colitis.  Patient developed leukopenia/neutropenia.  Ibrance resumed at previous dosing (100 mg daily 7 days on followed by 7 days off) on 2/9/22.  Following 5 cycles Faslodex/Ibrance PET scan 4/19/2022 with evidence of mixed response.  New hypermetabolic lesion spinous process L2 (SUV 5.1) and slight increase in activity left clavicular metastasis (SUV increased 4.6-8.1).  C7 hypermetabolic mixed lytic and blastic bone lesion was unchanged.  Decrease in uptake involving omental thickening.  Stable soft tissue thickening around the left renal pelvis and ureter.  Discussion again regarding potential pursuit of IV chemotherapy with Taxol versus continuation of Ibrance and Faslodex with radiation to sites of bony disease at L2, left clavicle, C7.  Patient opted to defer initiation of systemic chemotherapy and continue Ibrance/Faslodex with consideration of radiation.  Initiated monthly Xgeva on  5/19/2022 (cleared with nephrology).  Xgeva subsequently held due to significant hypocalcemia.  Decision made to forego further Xgeva with persistent hypocalcemia.  Palliative radiation received to lumbar spine regarding L2 metastasis 5/23- 6/7/2022  Ibrance held during radiation therapy beginning 5/22/2022.  Ibrance resumed 6/16/2022.  PET scan 7/21/2022 with stable hypermetabolic abdominal pelvic lymph nodes and subcarinal lymph node, stable bone lesions at C7, left clavicle.  Stable soft tissue thickening around the left renal pelvis with left hydronephrosis.  Uptake in adrenal glands felt to be likely reactive (no change in size).  No activity noted at L2 (previously radiated).  New hypermetabolic lesion right anterior sixth rib.  With evidence of further slight progression in bone on PET scan (new right sixth rib lesion), on 7/28/2022 discontinued Ibrance and plan to transition to abemaciclib with continuation of Faslodex.    On 8/4/2022 initiated abemaciclib at reduced dose of 50 mg twice daily.  On 8/25/2022, increased abemaciclib dose to 100 mg twice daily  PET scan 10/6/2022 with stable findings with exception of left acetabular activity report noted new activity however on prior PET scan question of focal activity present previously.  No corresponding CT abnormality and significant increase in SUV at sacral lesion.  Scan otherwise stable.  Decision to continue current treatment  Abemaciclib dose increased on 10/13/2022 up to 150 mg twice daily  PET scan 12/29/2022 with artifactual accumulation of tracer right axilla and right mastectomy bed without visualized CT abnormality.  Hypermetabolic bone lesions with decrease in level of activity.  No new findings.  PET scan 3/30/2023 with minimal/insignificant increase in activity in multiple bone lesions.  1 new focus of hypermetabolic activity left anterior acetabulum (SUV 10.7) however previously identified activity anterolateral left acetabulum is no longer  present.  Subcutaneous fluid collection lateral right anterior chest wall nearly completely resolved.  Due to continued clinical benefit from Faslodex/abemaciclib and with only 1 potential new finding in left acetabulum on PET scan, elected to continue current treatment at visit on 4/13/2023 and pursue MRI pelvis to evaluate left acetabulum with potential pursuit of radiation to solitary new left acetabular lesion.  Abemaciclib held 4/13/2023 due to neutropenia (ANC 0.95) and thrombocytopenia (platelet count 93,000).  On 4/27/2023 resumed abemaciclib 150 mg in the morning and subsequently added 100 mg dose in the evening on 5/6/2023.    Patient developed significant hematuria which had originally been interpreted as vaginal bleeding.  She then experienced migration and loss of her left ureteral stent spontaneously.  On 5/5/2023 she underwent replacement of left ureteral stent as well as cauterization of a bleeding site in the bladder.  She underwent at the same setting D&C with biopsy that showed no evidence of endometrium, minute fragment of benign endocervical mucosa.  Complete resolution of the hematuria.   MRI pelvis 5/8/2023 showed multiple areas of metastatic involvement in the pelvis, largest lesion 2.7 cm involving the anterior left acetabulum (corresponding to the new PET positive lesion).  No prior comparison.  Additional subtle 1 cm left iliac wing, 1.9 cm S2, 1.7 cm right iliac crest, 1.4 cm left pubic ramus, and 2.7 cm posterior right acetabular lesions noted.  There were several other subcentimeter foci of abnormal marrow signal in the sacrum and right iliac bone of unclear significance.  Patient received palliative radiation to the new/progressive lesion in the left anterior acetabulum, received 20 Gray in 5 fractions completed on 5/22/2023.  Due to continued myelosuppression, then the cycle of dose decreased further from 150 mg a.m. and 100 mg p.m. down to 100 mg twice daily on 6/1/2023.  PET scan  7/3/2023 with stable bone lesions with minor variations in degree of hypermetabolic activity likely clinically insignificant.  Increase in moderate ascites with some associated hypermetabolic activity in the anterior pelvis.  Focal 1 cm hypermetabolic soft tissue nodule in the right mesentery with SUV 5.2.  Patient last receiving cycle 23 Faslodex on 9/6/2023 along with continuing abemaciclib 100 mg twice daily.  She is scheduled for repeat PET scan 10/9/2023 after she returns from vacation.  CT abdomen/pelvis 9/29/23: Large amount of ascites, new. Adenopathy improved. No other findings.      *Anemia secondary to ESRD, underlying malignancy/chronic disease, myelosuppression from CDK 4/6 inhibitor, GI blood loss:  Anemia secondary to ESRD, malignancy with exacerbation by use of medication  H/o folate deficiency and iron deficiency  Following initiation of hemodialysis, management of BEAU transitioned to nephrology. On Micera  Transfusing prn  Stool negative for occult blood x3 on 11/2/2021  Patient did develop transient visible blood in stool in July 2022  Stool positive for occult blood x3 on 8/1/2022  Patient was seen by GI on 8/16/2022, felt to be high risk for endoscopic evaluation and elected to forego EGD/colonoscopy for now  Patient with significant gross hematuria with left ureteral stent migration and eventual loss.  Anemia worsened during this timeframe requiring transfusion support on 4/20/2023 with hemoglobin 6.1 and 5/3/2023 with hemoglobin 6.3.  We typically only transfuse her for hemoglobin less than 7 as her hemoglobin is usually in the 7-8 range.  She does continue every 4-week Mircera with nephrology.  Hemoglobin 6.8, from 7.4, from 8.1     *ESRD on HD:  Patient with previous CKD3/4  Hospitalization 4/29-5/4/2021 with RYAN/CKD, UTI, anemia.  Required initiation of hemodialysis Tuesday Thursday Saturday.   Decrease in frequency of dialysis to twice per week.  She continues to produce a significant  amount of urine.   Status post left upper extremity AV fistula placement on 11/3/2021 by vascular surgery  Attempt to hold further dialysis on 1/11/2022 with close monitoring of her laboratory and clinical parameters.  Dialysis quickly resumed due to development of hyperkalemia.  Patient was undergoing dialysis 2 days/week on Mondays and Fridays.    On 8/22/2022 patient transitioned to use of home dialysis device 5 days/week x 2 hours.     *CHF with severe aortic stenosis:  Echocardiogram 7/12/2019 showing ejection fraction 48% with moderate dilation of the LV cavity, global hypokinesis, grade 2 diastolic dysfunction, mild to moderate aortic stenosis, trivial pericardial effusion.  Echocardiogram 7/19/2021 with ejection fraction 56%, left atrial volume moderately increased, grade 2 diastolic dysfunction, severe calcification aortic valve, moderate aortic stenosis, severe mitral calcification, mild mitral stenosis, marked elevation in RVSP from tricuspid regurgitation.  Echocardiogram on 7/13/2022 with ejection fraction 56-60%, grade 2 diastolic dysfunction, severe aortic stenosis.    Cardiac catheterization 8/23/2022 showed normal coronary arteries, mild to moderate pulmonary hypertension.    She was evaluated by cardiothoracic surgery Dr. Pagan and there was discussion regarding potential candidacy for TAVR although there was concern given her underlying comorbidities including her metastatic breast cancer.  I discussed patient's prognosis with Dr. Pagan.  She does have opportunities for additional treatment of her malignancy with intravenous chemotherapy and is willing to pursue this in the future when needed.  It is unclear as to her expected survival however given her multiple severe comorbidities with metastatic breast cancer, ESRD on dialysis, severe aortic stenosis, severe anemia.  There is consideration of possible pursuit of balloon valvuloplasty initially to assess her symptomatic response and then consider  pursuit of TAVR in the future.   Patient did undergo aortic valvuloplasty during admission 1/10 - 1/11/2023.  Cardiology evaluated here     *Left upper and bilateral lower extremity peripheral neuropathy:  Patient reports that left upper extremity neuropathy was not present from previous chemotherapy but occurred after hospitalization that required intubation and critical care stay.  Apparently felt to represent critical care neuropathy.  Improved over time.  Bilateral lower extremity neuropathy felt to be related to diabetes  May have future implications regarding treatment for breast cancer.  Patient reports that peripheral neuropathy symptoms continue in the bilateral lower extremities, unchanged.  This will be a consideration with potential future use of Taxol     *Uterine/endometrial activity on PET scan:  Patient experienced postmenopausal vaginal bleeding in 2013, negative endometrial biopsy and gynecologic evaluation.  With findings on PET scan with enlarging uterus and some hypermetabolic activity, referred back to Dr. Oliveros in gynecology.    9/19/2019 D&C with hysteroscopy by Dr. Oliveros, no significant intraoperative findings, pathologic results with benign findings, no evidence of malignancy.  Patient with question of vaginal bleeding however subsequently became evident that this was hematuria.  She was referred to gynecology, did undergo on 5/5/2023 D&C at the same time as cystoscopy, biopsy with no evidence of malignancy but no endometrium present.     *Nausea:  Mild, relieved with Zofran.   Patient experienced improvement in nausea after initiating hemodialysis  9/26/2023 patient again is reporting today intermittent episodes of vomiting large amounts of bile beginning over the last 3 days.  See further discussion below.     *Myelosuppression secondary to CDK 4/6 inhibitor:  Patient was able to maintain adequate WBC after dosing alteration on Ibrance to treatment at 100 mg daily for 7 days on followed  by 7 days off.  Subsequent transition from Ibrance to abemaciclib  Overall counts improved on abemaciclib  On 6/1/2023, further decrease in abemaciclib dose to 100 mg twice daily due to neutropenia and thrombocytopenia.  Counts dropping     *Depression  Patient with history of depression, worsened after the death of her son in November 2021  With evidence of progressive malignancy in November 2021, patient agreeable to referral to supportive oncology  Patient currently receiving gabapentin 300 mg nightly, Zoloft 150 mg daily  Patient does continue with difficulties regarding depression that wax and wane.  Currently symptoms under fair control.  Patient was previously seeing supportive oncology.  She does not feel that she needs further follow-up at this time however would not rule out returning to see supportive oncology in the future if needed.  We will prescribe her Zoloft at this point.  She remains off of armodafinil.     *Left perinephric stranding secondary to malignancy with hydronephrosis:  CT 4/22/2021 showed interval enlargement of 2 staghorn calculi in the left kidney with persistent left perinephric stranding  Hospitalization 4/29-5/4/2021 with RYAN/CKD, UTI, anemia.  Required 2 unit PRBC transfusion and initiated hemodialysis Tuesday Thursday Saturday.    Discussion from urology regarding potential need for surgical procedure to address staghorn calculus left kidney  CT scan 7/2/2021 with only 1 remaining left renal stone identified which had decreased in size.  Patient appears to have passed the other stone.  As above, CT 10/26/2021 with more prominent left hydronephrosis and increase in left perinephric and periureteral stranding.  Felt to possibly be related to passage of recent stone versus partial obstruction secondary to debris or thrombus in the left ureter versus pyelonephritis.  Patient however with no clinical evidence of recent stone passage nor pyelonephritis. Malignancy felt to be the cause  of CT changes around the left kidney at this point.   Left ureteral stent replacement 12/16/2021  PET scan 1/11/2022 with decrease in fat injection near left renal pelvis, stent in satisfactory position.  Mild residual hydronephrosis on the left.  Ureteral stent replaced on 3/10/2022  PET scan 4/19/2022 with no hydronephrosis, left ureteral stent in place  PET scan 7/21/2022 with persistent left hydronephrosis, ureteral stent in place  PET scan 10/6/2022 with left nephroureteral stent in place, overall stable findings  PET scan 12/29/2022 with left nephroureteral stent remaining in place, stable findings  PET scan 3/30/2023 with left ureteral stent in place.  Patient developed gross hematuria with migration and spontaneous loss of left ureteral stent.  Patient underwent stent replacement on 5/5/2023.  Cauterization of bleeding focus in the bladder.  Resolution of hematuria.  Patient did see urology 8/21/2023, plan further follow-up at 3-month interval.     *Right thyroid nodules  Patient underwent prior thyroid biopsy by her report with benign findings many years ago, records not available  On MRI cervical spine 11/18/2021, finding of 1.8 cm right thyroid nodule  Thyroid ultrasound 11/26/2021 with right-sided thyroid nodules, 1 nodule was hypoechoic, solid measuring 2 cm with biopsy recommended.  Thyroid ultrasound results reviewed with patient.  In light of her metastatic breast cancer, renal failure the significance of the thyroid nodule is felt to be much less.  We discussed possibility of thyroid biopsy however patient is inclined to forego this, especially since she has had a biopsy performed in the past with benign findings by her report.    No hypermetabolic activity identified on PET scan 1/11/2022 in the neck     *Hospitalization 1/28-1/30/2022 due to COVID-19 infection and C. difficile colitis  Patient fully vaccinated with 3 doses Moderna COVID-19 vaccine  Patient developed symptoms 1/26/2022 with abrupt  onset sinus congestion, mild cough, subsequently developed nausea and diarrhea on 1/27/2022.  Significant fatigue.  Patient tested positive in emergency department on 1/28/2022 and was admitted  Patient maintained O2 saturation in mid 90% range on room air  Patient received remdesivir  Patient was also found to be positive for C. difficile colitis, received course of oral vancomycin.  Symptoms from both COVID-19 and C. difficile colitis ultimately resolved.     *Hypocalcemia  Patient developed significant hypocalcemia after receiving Xgeva on 5/19/2022  Calcium management per nephrology  No further Xgeva planned due to persistent significant hypocalcemia     *Diarrhea  Patient with history of C. difficile infection in January 2022  Patient with intermittent diarrhea since around June 2023.  Question whether ongoing intermittent diarrhea is related to abemaciclib   Stool studies on 7/31/2023 with negative GI PCR however C. difficile testing showed negative C. difficile toxin but positive C. difficile PCR.  Patient with ongoing diarrhea.  Received treatment with vancomycin p.o. 125 mg 4 times daily x10 days beginning 8/9/2023.  Patient experienced complete resolution of diarrhea  Stool with positive C dif PCR but negative ag  ID following. No tx at this time     *Hypokalemia     PLAN:  Continue to Hold Verzenio for now and at discharge  Paracentesis. Cytology has been requested  Transfuse with dialysis here today  Typically on home HD 5 days/week  Patient is receiving Mircera under the direction of nephrology, dosing every 4 weeks as well as IV iron intermittently as needed.  Outpatient PET scan planned for 10/9 and follow up with Dr. Irvin on 10/18.  She is hopeful for discharge soon so that she can travel to [place] tomorrow.  No objections to this from my standpoint as long as she is feeling well.  We will see her back in the office as scheduled.  I will follow while she is admitted      Tai Nieves MD

## 2023-09-29 NOTE — CONSULTS
[facility]                                                                                             Nephrology Initial Consult Note    Patient Identification:  Name: Juana Hall MRN: [id number]  Age: 62 y.o. : 1960  Sex: female  Date:2023    Requesting Physician: As per consult order.  Reason for Consultation: ESRD, hypokalemia management  Information from:patient/ family/ chart      History of Present Illness: This is a 62 y.o. year old female who is well-known to me from outpatient dialysis.  She has a history of metastatic breast cancer, CHF, aortic stenosis status post valvuloplasty and is on home hemodialysis 5 days/week.  Her last dialysis was on Wednesday.  Gotten several calls from her dialysis unit and also from oncology about her low potassium associated with refractory diarrhea, nausea and vomiting.  Initially this was felt to be due to recurrent C. difficile colitis.  She was admitted last night due to recurrent symptoms was found to be hypokalemic but she feels much better today denies any nausea or vomiting and she states her diarrhea is better.  She has had no fevers or chills and agrees to dialysis today.  Her hemoglobin is 6.8 and her potassium is 2.7.  Denies any shortness of breath or chest pain.  She has an AV fistula in place for access with no recent issues.    The following medical history and medications personally reviewed by me:    Problem List:     Intractable vomiting      Past Medical History:  Past Medical History:   Diagnosis Date    Anemia     Anxiety and depression     Bicuspid aortic valve     had surgery    Breast cancer     RIGHT, HAD NEELA. 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    History of sepsis 2010    KIDNEY STONE    History of transfusion     no reaction    Hyperlipidemia     Hyperthyroidism     Hypothyroidism     Kidney stone     CURRENT LEFT-DEC 2021    Lymphedema of leg     LEFT    Metastasis from breast cancer 2019    STOMACH-OMENTUM    Neuromuscular disorder     Obesity     ANDREW on CPAP     CPAP    Osteoarthrosis     Peripheral neuropathy     FEET BILAT    Radiculopathy     Rectal bleeding 08/16/2022    Thickened endometrium     Urinary incontinence     wears pads    Weakness     BILAT LEGS-WITH ANY AMT OF TIME       Past Surgical History:  Past Surgical History:   Procedure Laterality Date    AORTIC VALVULOPLASTY  01/2023    ARTERIOVENOUS FISTULA/SHUNT SURGERY Left 11/03/2021    Procedure: LEFT  BRACHIOCEPALIC ARTERIOVENOUS FISTULA;  Surgeon: Dejuan Adler MD;  Location: [facility] OR;  Service: Vascular;  Laterality: Left;    BREAST RECONSTRUCTION  2004    WITH IMPLANTS, AND REVISION, NOW REMOVED    BREAST SURGERY Bilateral 2004    breast implants removed and then replaced due to infection    CARDIAC CATHETERIZATION N/A 08/23/2022    Procedure: CORONARY ANGIOGRAPHY;  Surgeon: Luis Rivers MD;  Location: [facility] CATH INVASIVE LOCATION;  Service: Cardiology;  Laterality: N/A;    CARDIAC CATHETERIZATION N/A 08/23/2022    Procedure: Right Heart Cath;  Surgeon: Luis Rivers MD;  Location: [facility] CATH INVASIVE LOCATION;  Service: Cardiology;  Laterality: N/A;    CARDIAC CATHETERIZATION N/A 01/10/2023    Procedure: Valvuloplasty;  Surgeon: Luis Rivers MD;  Location: [facility] CATH INVASIVE  LOCATION;  Service: Cardiovascular;  Laterality: N/A;  01/10/2023    CARDIAC CATHETERIZATION N/A 01/10/2023    Procedure: Aortic root aortogram;  Surgeon: Luis Rivers MD;  Location: [facility] CATH INVASIVE LOCATION;  Service: Cardiovascular;  Laterality: N/A;    CATARACT EXTRACTION WITH INTRAOCULAR LENS IMPLANT Bilateral      SECTION  1987     SECTION  1987    CYSTOSCOPY W/ URETERAL STENT PLACEMENT  2010    CYSTOSCOPY W/ URETERAL STENT PLACEMENT Left 2021    Procedure: CYSTOSCOPY KEFT RETROGRADE PYELOGRAM  LEFT  URETERAL STENT PLACEMENT;  Surgeon: Leodan Mckee Jr., MD;  Location: [facility] MAIN OR;  Service: Urology;  Laterality: Left;    CYSTOSCOPY W/ URETERAL STENT PLACEMENT Left 03/10/2022    Procedure: CYSTOSCOPY AND LEFT URETERAL STENT EXCHANGE, RETROGRADE PYELOGRAM;  Surgeon: Leodan Mckee Jr., MD;  Location: [facility] MAIN OR;  Service: Urology;  Laterality: Left;    CYSTOSCOPY W/ URETERAL STENT PLACEMENT Left 2023    Procedure: CYSTOSCOPY WITH LEFT URETERAL STENT PLACEMENT, RETROGRADE PYELOGRAM, CLOT EVACUATION, BLADDER FULGARATION;  Surgeon: Leodan Mckee Jr., MD;  Location: [facility] MAIN OR;  Service: Urology;  Laterality: Left;    D & C HYSTEROSCOPY N/A 2017    Procedure: DILATATION AND CURETTAGE, HYSTEROSCOPY ;  Surgeon: Cherie Oliveros MD;  Location: [facility] OR [facility];  Service:     D & C HYSTEROSCOPY N/A 2019    Procedure: DILATATION AND CURETTAGE HYSTEROSCOPY/POLYPECTOMY;  Surgeon: Cherie Oliveros MD;  Location: [facility] OR [facility];  Service: Gynecology    D & C HYSTEROSCOPY ENDOMETRIAL ABLATION N/A 2023    Procedure: DILATATION AND CURETTAGE;  Surgeon: Ina Marcos MD;  Location: [facility] MAIN OR;  Service: Obstetrics/Gynecology;  Laterality: N/A;    ENDOSCOPY      INSERTION AND REMOVAL HEMODIALYSIS CATHETER Right 2021    INSERTION HEMODIALYSIS CATHETER Right 2021    Procedure: HEMODIALYSIS CATHETER INSERTION;   Surgeon: Clint Hernández MD;  Location: [facility] MAIN OR;  Service: Vascular;  Laterality: Right;    LYMPH NODE BIOPSY      MASTECTOMY Bilateral 2004    VENOUS ACCESS DEVICE (PORT) INSERTION AND REMOVAL          Home Meds:   Medications Prior to Admission   Medication Sig Dispense Refill Last Dose    Abemaciclib (Verzenio) 100 MG tablet Take 1 tablet by mouth 2 (Two) Times a Day. 56 tablet 5 Past Month    atorvastatin (LIPITOR) 40 MG tablet TAKE ONE TABLET BY MOUTH ONCE NIGHTLY 30 tablet 0 9/27/2023    diphenoxylate-atropine (LOMOTIL) 2.5-0.025 MG per tablet Take 1 tablet by mouth 4 (Four) Times a Day As Needed for Diarrhea. 30 tablet 0 9/27/2023    insulin detemir (Levemir FlexPen) 100 UNIT/ML injection Inject 50 Units under the skin into the appropriate area as directed Daily. 11 mL 0 Past Week    vancomycin (Vancocin) 125 MG capsule Take 1 capsule by mouth 4 (Four) Times a Day for 14 days. 56 capsule 0 9/27/2023    acetaminophen (TYLENOL) 500 MG tablet Take 2 tablets by mouth As Needed for Mild Pain.       folic acid (FOLVITE) 1 MG tablet TAKE 1 TABLET BY MOUTH DAILY 30 tablet 2     Fulvestrant (FASLODEX) 250 MG/5ML chemo syringe Inject  into the appropriate muscle as directed by prescriber Every 30 (Thirty) Days. In MD office       gabapentin (Neurontin) 300 MG capsule Take 1 capsule by mouth Every Night. 60 capsule 2     HYDROcodone-acetaminophen (NORCO) 5-325 MG per tablet Take 1-2 tablets by mouth Every 4 (Four) Hours As Needed for Moderate Pain (Pain). 12 tablet 0     levothyroxine (SYNTHROID, LEVOTHROID) 50 MCG tablet Take 1 tablet by mouth Daily. (Patient taking differently: Take 1 tablet by mouth Every Morning.) 90 tablet 1     mupirocin (BACTROBAN) 2 % ointment Apply 1 application  topically to the appropriate area as directed Daily. Applied to fistula insertion sites for dialysis       ondansetron (ZOFRAN) 8 MG tablet TAKE ONE TABLET BY MOUTH EVERY 8 HOURS AS NEEDED FOR NAUSEA OR VOMITING 18 tablet 2      sertraline (ZOLOFT) 100 MG tablet TAKE 1 AND 1/2 TABLET BY MOUTH DAILY 135 tablet 0     vitamin B-12 (CYANOCOBALAMIN) 1000 MCG tablet Take 1 tablet by mouth Daily.          Current Meds:   Current Facility-Administered Medications   Medication Dose Route Frequency Provider Last Rate Last Admin    acetaminophen (TYLENOL) tablet 650 mg  650 mg Oral Q4H PRN Mary Rose MD        sennosides-docusate (PERICOLACE) 8.6-50 MG per tablet 2 tablet  2 tablet Oral BID Mary Rose MD        And    polyethylene glycol (MIRALAX) packet 17 g  17 g Oral Daily PRN Mary Rose MD        And    bisacodyl (DULCOLAX) EC tablet 5 mg  5 mg Oral Daily PRN Mary Rose MD        And    bisacodyl (DULCOLAX) suppository 10 mg  10 mg Rectal Daily PRN Mary Rose MD        dextrose (D50W) (25 g/50 mL) IV injection 25 g  25 g Intravenous Q15 Min PRN Mary Rose MD        dextrose (GLUTOSE) oral gel 15 g  15 g Oral Q15 Min PRN Mary Rose MD        folic acid (FOLVITE) tablet 1,000 mcg  1,000 mcg Oral Daily Mary Rose MD        gabapentin (NEURONTIN) capsule 300 mg  300 mg Oral Nightly Mary Rose MD        glucagon (GLUCAGEN) injection 1 mg  1 mg Intramuscular Q15 Min PRN Mary Rose MD        HYDROcodone-acetaminophen (NORCO) 5-325 MG per tablet 1 tablet  1 tablet Oral Q4H PRN Mary Rose MD        insulin lispro (HUMALOG/ADMELOG) injection 2-7 Units  2-7 Units Subcutaneous 4x Daily AC & at Bedtime Mary Rose MD        levothyroxine (SYNTHROID, LEVOTHROID) tablet 50 mcg  50 mcg Oral Q AM Mary Rose MD   50 mcg at 09/29/23 0618    melatonin tablet 3 mg  3 mg Oral Nightly PRN Mary Rose MD        ondansetron (ZOFRAN) tablet 4 mg  4 mg Oral Q6H PRN Mary Rose MD        Or    ondansetron (ZOFRAN) injection 4 mg  4 mg Intravenous Q6H PRN Stingl, Mary Castillo MD        potassium  chloride (K-DUR,KLOR-CON) ER tablet 40 mEq  40 mEq Oral Once Tai Antonio MD        prochlorperazine (COMPAZINE) injection 10 mg  10 mg Intravenous Q6H PRN Mary Rose MD        sertraline (ZOLOFT) tablet 150 mg  150 mg Oral Daily Mary Rose MD        vitamin B-12 (CYANOCOBALAMIN) tablet 1,000 mcg  1,000 mcg Oral Daily Mary Rose MD           Allergies:  No Known Allergies    Social History:   Social History     Socioeconomic History    Marital status:      Spouse name: Rk   Tobacco Use    Smoking status: Never    Smokeless tobacco: Never   Vaping Use    Vaping Use: Never used   Substance and Sexual Activity    Alcohol use: No    Drug use: Never    Sexual activity: Yes     Partners: Male     Birth control/protection: Post-menopausal        Family History:  Family History   Problem Relation Age of Onset    Heart attack Mother     Coronary artery disease Mother         Mother  from MI at 72    Diabetes Mother     Breast cancer Mother     Hyperlipidemia Mother     Arthritis Mother     Cancer Mother     Heart attack Father     Aortic aneurysm Father         Father with ruptured thoracic aortic aneurysm    Coronary artery disease Father         Father  from MI at 74    Heart disease Father     Hyperlipidemia Father     Arthritis Father     Heart attack Maternal Grandmother     Coronary artery disease Maternal Grandmother         MGM deceeased from MI at age 76    Malig Hyperthermia Neg Hx         Review of Systems: as per HPI, in addition:    General:      No weakness / fatigue,                       No fevers / chills                       no weight loss  HEENT:       no dysphagia / odynophagia  Neck:           normal range of motion, no swelling  Respiratory: no cough / congestion                      No shortness of air                       No wheezing  CV:              No chest pain                       No palpitations  Abdomen/GI: Improved  "nausea, vomiting and diarrhea                      No abdominal pain  :             no dysuria / urinary frequency                       No urgency, normal output  Endocrine:   no polyuria / polydipsia,                      No heat or cold intolerance  Skin:           no rashes or skin breakdown   Vascular:   No edema                     No claudication  Psych:        no depression/ anxiety  Neuro:        no focal weakness, no seizures  Musculoskeletal: no joint pain or deformities      Physical Exam:  Vitals:   Temp (24hrs), Av.9 °F (36.6 °C), Min:97.5 °F (36.4 °C), Max:98.2 °F (36.8 °C)    BP 94/56 (BP Location: Right arm, Patient Position: Lying)   Pulse 87   Temp 98.1 °F (36.7 °C) (Oral)   Resp 16   Ht 170.2 cm (67\")   Wt (!) 172 kg (379 lb 3.1 oz)   LMP  (LMP Unknown)   SpO2 94%   BMI 59.39 kg/m²   Intake/Output:     Intake/Output Summary (Last 24 hours) at 2023 0905  Last data filed at 2023 1430  Gross per 24 hour   Intake 500 ml   Output --   Net 500 ml        Wt Readings from Last 1 Encounters:   23 0323 (!) 172 kg (379 lb 3.1 oz)   23 2046 (!) 172 kg (380 lb)   23 1159 (!) 169 kg (372 lb 9.2 oz)       Exam:    General Appearance:  Awake, alert, oriented x3, no acute distress  Obese, well-appearing   Head and Face:  Normocephalic, atraumatic, mucus membranes moist, oropharynx clear   Eyes:  No icterus, pupils equal round and reactive to light, extraocular movements intact    ENMT: Moist mucosa, tongue symmetric    Neck: Supple  no jugular venous distention  no thyromegaly   Pulmonary:  Respiratory effort: Normal  Auscultation of lungs: Clear bilaterally  No wheezes  No rhonchi  Good air movement, good expansion   Chest wall:  No tenderness or deformity   Cardiovascular:  Auscultation of the heart: Normal rhythm, no murmurs  Trace edema of bilateral lower extremities, upper extremity aVF   Abdomen:  Abdomen: soft, non-tender, normal bowel sounds all four quadrants, no " masses   Liver and spleen: no hepatosplenomegaly   Musculoskeletal: Digits and nails: normal  Normal range of motion  No joint swelling or gross deformities    Skin: Skin inspection: color normal, no visible rashes or lesions  Skin palpation: texture, turgor normal, no palpable lesions   Lymphatic:  no cervical lymphadenopathy    Psychiatric: Judgement and insight: normal  Orientation to person place and time: normal  Mood and affect: normal       DATA:  Radiology and Labs:  The following labs independently reviewed by me, additional AM labs ordered  Old records independently reviewed showing ESRD, refractory anemia due to chemotherapy  The following radiologic studies independently viewed by me, findings CT abdomen and pelvis showed large amount of ascites which is new she has smaller lymph nodes, atrophic kidneys seen patient   Interval notes, chart personally reviewed by me.  I have reviewed and summarized old records as detailed above  Plan of care discussed with herself at bedside, discussed plan of care with her RN  New problems include severe hypokalemia, refractory nausea vomiting and diarrhea    Dialysis patient access: Upper extremity aVF    Risk/ complexity of medical care/ medical decision making: Moderate complexity, severe electrolyte abnormalities and dialysis management  Chronic illness with severe exacerbation or progression: ESRD      Labs:   Recent Results (from the past 24 hour(s))   Comprehensive Metabolic Panel    Collection Time: 09/28/23  1:41 PM    Specimen: Blood   Result Value Ref Range    Glucose 178 (H) 65 - 99 mg/dL    BUN 26 (H) 8 - 23 mg/dL    Creatinine 6.85 (H) 0.57 - 1.00 mg/dL    Sodium 137 136 - 145 mmol/L    Potassium 2.8 (L) 3.5 - 5.2 mmol/L    Chloride 95 (L) 98 - 107 mmol/L    CO2 27.0 22.0 - 29.0 mmol/L    Calcium 8.7 8.6 - 10.5 mg/dL    Total Protein 6.9 6.0 - 8.5 g/dL    Albumin 2.7 (L) 3.5 - 5.2 g/dL    ALT (SGPT) 11 1 - 33 U/L    AST (SGOT) 12 1 - 32 U/L    Alkaline  Phosphatase 104 39 - 117 U/L    Total Bilirubin 0.6 0.0 - 1.2 mg/dL    Globulin 4.2 gm/dL    A/G Ratio 0.6 g/dL    BUN/Creatinine Ratio 3.8 (L) 7.0 - 25.0    Anion Gap 15.0 5.0 - 15.0 mmol/L    eGFR 6.3 (L) >60.0 mL/min/1.73   Protime-INR    Collection Time: 09/28/23  1:41 PM    Specimen: Blood   Result Value Ref Range    Protime 16.3 (H) 11.7 - 14.2 Seconds    INR 1.29 (H) 0.90 - 1.10   Lipase    Collection Time: 09/28/23  1:41 PM    Specimen: Blood   Result Value Ref Range    Lipase 22 13 - 60 U/L   Lactic Acid, Plasma    Collection Time: 09/28/23  1:41 PM    Specimen: Blood   Result Value Ref Range    Lactate 1.2 0.5 - 2.0 mmol/L   Procalcitonin    Collection Time: 09/28/23  1:41 PM    Specimen: Blood   Result Value Ref Range    Procalcitonin 0.57 (H) 0.00 - 0.25 ng/mL   CBC Auto Differential    Collection Time: 09/28/23  1:41 PM    Specimen: Blood   Result Value Ref Range    WBC 2.64 (L) 3.40 - 10.80 10*3/mm3    RBC 2.25 (L) 3.77 - 5.28 10*6/mm3    Hemoglobin 7.4 (L) 12.0 - 15.9 g/dL    Hematocrit 22.6 (L) 34.0 - 46.6 %    .4 (H) 79.0 - 97.0 fL    MCH 32.9 26.6 - 33.0 pg    MCHC 32.7 31.5 - 35.7 g/dL    RDW 15.7 (H) 12.3 - 15.4 %    RDW-SD 57.6 (H) 37.0 - 54.0 fl    MPV 9.7 6.0 - 12.0 fL    Platelets 110 (L) 140 - 450 10*3/mm3    Neutrophil % 82.3 (H) 42.7 - 76.0 %    Lymphocyte % 9.8 (L) 19.6 - 45.3 %    Monocyte % 5.3 5.0 - 12.0 %    Eosinophil % 1.1 0.3 - 6.2 %    Basophil % 1.1 0.0 - 1.5 %    Immature Grans % 0.4 0.0 - 0.5 %    Neutrophils, Absolute 2.17 1.70 - 7.00 10*3/mm3    Lymphocytes, Absolute 0.26 (L) 0.70 - 3.10 10*3/mm3    Monocytes, Absolute 0.14 0.10 - 0.90 10*3/mm3    Eosinophils, Absolute 0.03 0.00 - 0.40 10*3/mm3    Basophils, Absolute 0.03 0.00 - 0.20 10*3/mm3    Immature Grans, Absolute 0.01 0.00 - 0.05 10*3/mm3    nRBC 0.0 0.0 - 0.2 /100 WBC   Magnesium    Collection Time: 09/28/23  5:11 PM    Specimen: Blood   Result Value Ref Range    Magnesium 1.9 1.6 - 2.4 mg/dL   ECG 12 Lead  Rhythm Change    Collection Time: 09/29/23  3:33 AM   Result Value Ref Range    QT Interval 426 ms    QTC Interval 504 ms   POC Glucose Once    Collection Time: 09/29/23  6:12 AM    Specimen: Blood   Result Value Ref Range    Glucose 160 (H) 70 - 130 mg/dL   Basic Metabolic Panel    Collection Time: 09/29/23  6:45 AM    Specimen: Blood   Result Value Ref Range    Glucose 148 (H) 65 - 99 mg/dL    BUN 30 (H) 8 - 23 mg/dL    Creatinine 7.18 (H) 0.57 - 1.00 mg/dL    Sodium 135 (L) 136 - 145 mmol/L    Potassium 2.7 (L) 3.5 - 5.2 mmol/L    Chloride 97 (L) 98 - 107 mmol/L    CO2 25.0 22.0 - 29.0 mmol/L    Calcium 8.2 (L) 8.6 - 10.5 mg/dL    BUN/Creatinine Ratio 4.2 (L) 7.0 - 25.0    Anion Gap 13.0 5.0 - 15.0 mmol/L    eGFR 5.9 (L) >60.0 mL/min/1.73   CBC (No Diff)    Collection Time: 09/29/23  6:45 AM    Specimen: Blood   Result Value Ref Range    WBC 2.68 (L) 3.40 - 10.80 10*3/mm3    RBC 2.10 (L) 3.77 - 5.28 10*6/mm3    Hemoglobin 6.8 (C) 12.0 - 15.9 g/dL    Hematocrit 20.4 (C) 34.0 - 46.6 %    MCV 97.1 (H) 79.0 - 97.0 fL    MCH 32.4 26.6 - 33.0 pg    MCHC 33.3 31.5 - 35.7 g/dL    RDW 15.6 (H) 12.3 - 15.4 %    RDW-SD 55.3 (H) 37.0 - 54.0 fl    MPV 9.8 6.0 - 12.0 fL    Platelets 101 (L) 140 - 450 10*3/mm3       Radiology:  Imaging Results (Last 24 Hours)       Procedure Component Value Units Date/Time    CT Abdomen Pelvis Without Contrast [[id number]] Collected: 09/28/23 1514     Updated: 09/28/23 1522    Narrative:      Examination: CT of the abdomen and pelvis without contrast     Technique: CT of the abdomen and pelvis without contrast per protocol.  Radiation dose reduction techniques were utilized, including automated  exposure control and exposure modulation based on body size.        History: Abdominal pain, cancer, and diarrhea     Comparison: PET/CT of 10/6/2022.     Bone windows demonstrate degenerative changes, without suspicious  osseous lesion seen.       Impression:      Large amount of ascites. Incidental  findings as above.     Findings: Limited evaluation of the inferior thorax demonstrates  atelectasis, without consolidation, pleural effusion, or pneumothorax.  The heart is mildly enlarged, without pericardial effusion. Mitral  annular calcifications are seen.     There is a large amount of ascites. The kidneys are atrophic. There is a  left-sided double-J ureteral stent.     The liver, spleen, adrenal glands, pancreas, stomach, small bowel, large  bowel, uterus, and abdominal vasculature are normal as evaluated on this  noncontrast examination. No intraperitoneal free gas is seen. No  enlarged lymph nodes are demonstrated. Bilateral external iliac chain  lymph nodes are smaller, measuring up to 9 mm in short axis diameter.     Bone windows demonstrate degenerative changes, without suspicious  osseous lesion seen.     IMPRESSION:  1. Large amount of ascites, new     2. Smaller external iliac chain lymph nodes     3. Incidental findings as above.     This report was finalized on 9/28/2023 3:19 PM by Dr. Ashish Farmer M.D.                    ASSESSMENT:   ESRD on home hemodialysis 5 days/week  Severe hypokalemia due to GI losses  Intractable nausea vomiting and diarrhea, improved today  Metastatic breast cancer on immunotherapy  Diabetes mellitus type 2  Chronic anemia, in part due to immunotherapy, she is on high-dose BEAU as an outpatient    Intractable vomiting  Iron deficiency anemia on outpatient IV iron  Morbid obesity  Metabolic alkalosis from vomiting  Leukopenia and thrombocytopenia      DISCUSSION/PLAN:   Symptoms much improved overnight  Last dialysis was on Wednesday so we will plan a full HD treatment today  Will use a 4K bath for potassium correction and give additional oral potassium  Stop IV fluids since oral intake is improved  She is on outpatient Mircera for her anemia in addition to IV iron  Transfuse 1 unit packed red blood cells with HD today  Follow-up a.m. labs    No objections to discharge  home in the morning if she is clinically stable and labs are improved.  She says she is leaving for vacation in [place] tomorrow afternoon  She has an outpatient prescription for potassium so if potassium is still low in the morning she should complete that prescription which is for KCl, 40 millequivalents x10 days and patient instructed to repeat labs in 2 weeks    Continue to monitor electrolytes and volume closely    I appreciate the consult request  Please send me a secure chat message if any questions regarding patient care.      Tai Antonio MD  [facility]  Office phone number: [phone]  Answering service phone number: [phone]      9/29/2023        Dictation via Dragon dictation software

## 2023-09-29 NOTE — H&P
HISTORY AND PHYSICAL   [facility]        Date of Admission: 2023  Patient Identification:  Name: Juana Hall  Age: 62 y.o.  Sex: female  :  1960  MRN: [id number]                     Primary Care Physician: Kiana Noriega), BETY    Chief Complaint:  62 year old female who presented to the emergency room with nausaa and vomiting and diarrhea for the last few days; she has been unable to keep down anything po; she has a history of breast cancer and esrd; she has a history of c diff colitis but is felt to be a carrier rather than actively infected at this point; she has had subjective fever or chills; she has had malaise and fatigue    History of Present Illness:   As above    Past Medical History:  Past Medical History:   Diagnosis Date    Anemia     Anxiety and depression     Bicuspid aortic valve     had surgery    Breast cancer     RIGHT, HAD NEELA. MASTECTOMY, CHEMO AND RADIATION    CHF (congestive heart failure)     CKD (chronic kidney disease)     dialysis    COVID 2023    Diabetes mellitus     Dialysis patient     pt does at home    Elevated cholesterol     Frequent UTI     last 6 months    Heart murmur     History of cancer chemotherapy     History of hypertension     due to low b/p was taken off meds    History of kidney stones     History of MRSA infection 2005    POST BREAST SURGERY-TREATED [facility]    History of radiation therapy     2 times [id number] for breast cancer   and  for omentum cancer    History of sepsis     KIDNEY STONE    History of transfusion     no reaction    Hyperlipidemia     Hyperthyroidism     Hypothyroidism     Kidney stone     CURRENT LEFT-DEC 2021    Lymphedema of leg     LEFT    Metastasis from breast cancer 2019    STOMACH-OMENTUM    Neuromuscular disorder     Obesity     ANDREW on CPAP     CPAP    Osteoarthrosis     Peripheral neuropathy     FEET BILAT    Radiculopathy     Rectal bleeding 2022    Thickened endometrium      Urinary incontinence     wears pads    Weakness     BILAT LEGS-WITH ANY AMT OF TIME     Past Surgical History:  Past Surgical History:   Procedure Laterality Date    AORTIC VALVULOPLASTY  2023    ARTERIOVENOUS FISTULA/SHUNT SURGERY Left 2021    Procedure: LEFT  BRACHIOCEPALIC ARTERIOVENOUS FISTULA;  Surgeon: Dejuan Adler MD;  Location: [facility] MAIN OR;  Service: Vascular;  Laterality: Left;    BREAST RECONSTRUCTION      WITH IMPLANTS, AND REVISION, NOW REMOVED    BREAST SURGERY Bilateral     breast implants removed and then replaced due to infection    CARDIAC CATHETERIZATION N/A 2022    Procedure: CORONARY ANGIOGRAPHY;  Surgeon: Luis Rivers MD;  Location:  [facility] CATH INVASIVE LOCATION;  Service: Cardiology;  Laterality: N/A;    CARDIAC CATHETERIZATION N/A 2022    Procedure: Right Heart Cath;  Surgeon: Luis Rivers MD;  [facility];  Service: Cardiology;  Laterality: N/A;    CARDIAC CATHETERIZATION N/A 01/10/2023    Procedure: Valvuloplasty;  Surgeon: Luis Rivers MD;  [facility];  Service: Cardiovascular;  Laterality: N/A;  01/10/2023    CARDIAC CATHETERIZATION N/A 01/10/2023    Procedure: Aortic root aortogram;  Surgeon: Luis Rivers MD;  [facility];  Service: Cardiovascular;  Laterality: N/A;    CATARACT EXTRACTION WITH INTRAOCULAR LENS IMPLANT Bilateral      SECTION  1987     SECTION  1987    CYSTOSCOPY W/ URETERAL STENT PLACEMENT      CYSTOSCOPY W/ URETERAL STENT PLACEMENT Left 2021    Procedure: CYSTOSCOPY KEFT RETROGRADE PYELOGRAM  LEFT  URETERAL STENT PLACEMENT;  Surgeon: Leodan Mckee Jr., MD;  Location: [facility] MAIN OR;  Service: Urology;  Laterality: Left;    CYSTOSCOPY W/ URETERAL STENT PLACEMENT Left 03/10/2022    Procedure: CYSTOSCOPY AND LEFT URETERAL STENT EXCHANGE, RETROGRADE  PYELOGRAM;  Surgeon: Leodan Mckee Jr., MD;  Location: [facility] MAIN OR;  Service: Urology;  Laterality: Left;    CYSTOSCOPY W/ URETERAL STENT PLACEMENT Left 5/5/2023    Procedure: CYSTOSCOPY WITH LEFT URETERAL STENT PLACEMENT, RETROGRADE PYELOGRAM, CLOT EVACUATION, BLADDER FULGARATION;  Surgeon: Leodan Mckee Jr., MD;  Location: [facility] MAIN OR;  Service: Urology;  Laterality: Left;    D & C HYSTEROSCOPY N/A 05/22/2017    Procedure: DILATATION AND CURETTAGE, HYSTEROSCOPY ;  Surgeon: Cherie Oliveros MD;  Location:  [facility];  Service:     D & C HYSTEROSCOPY N/A 09/19/2019    Procedure: DILATATION AND CURETTAGE HYSTEROSCOPY/POLYPECTOMY;  Surgeon: Cherie Olivreos MD;  Location:  [facility];  Service: Gynecology    D & C HYSTEROSCOPY ENDOMETRIAL ABLATION N/A 5/5/2023    Procedure: DILATATION AND CURETTAGE;  Surgeon: Ina Marcos MD;  Location: [facility] MAIN OR;  Service: Obstetrics/Gynecology;  Laterality: N/A;    ENDOSCOPY      INSERTION AND REMOVAL HEMODIALYSIS CATHETER Right 05/01/2021    INSERTION HEMODIALYSIS CATHETER Right 05/01/2021    Procedure: HEMODIALYSIS CATHETER INSERTION;  Surgeon: Clint Hernández MD;  Location: [facility] MAIN OR;  Service: Vascular;  Laterality: Right;    LYMPH NODE BIOPSY      MASTECTOMY Bilateral 2004    VENOUS ACCESS DEVICE (PORT) INSERTION AND REMOVAL        Home Meds:  Medications Prior to Admission   Medication Sig Dispense Refill Last Dose    Abemaciclib (Verzenio) 100 MG tablet Take 1 tablet by mouth 2 (Two) Times a Day. 56 tablet 5 Past Month    atorvastatin (LIPITOR) 40 MG tablet TAKE ONE TABLET BY MOUTH ONCE NIGHTLY 30 tablet 0 9/27/2023    diphenoxylate-atropine (LOMOTIL) 2.5-0.025 MG per tablet Take 1 tablet by mouth 4 (Four) Times a Day As Needed for Diarrhea. 30 tablet 0 9/27/2023    insulin detemir (Levemir FlexPen) 100 UNIT/ML injection Inject 50 Units under the skin into the appropriate area as directed Daily. 11 mL 0 Past Week    vancomycin (Vancocin)  125 MG capsule Take 1 capsule by mouth 4 (Four) Times a Day for 14 days. 56 capsule 0 9/27/2023    acetaminophen (TYLENOL) 500 MG tablet Take 2 tablets by mouth As Needed for Mild Pain.       folic acid (FOLVITE) 1 MG tablet TAKE 1 TABLET BY MOUTH DAILY 30 tablet 2     Fulvestrant (FASLODEX) 250 MG/5ML chemo syringe Inject  into the appropriate muscle as directed by prescriber Every 30 (Thirty) Days. In MD office       gabapentin (Neurontin) 300 MG capsule Take 1 capsule by mouth Every Night. 60 capsule 2     HYDROcodone-acetaminophen (NORCO) 5-325 MG per tablet Take 1-2 tablets by mouth Every 4 (Four) Hours As Needed for Moderate Pain (Pain). 12 tablet 0     levothyroxine (SYNTHROID, LEVOTHROID) 50 MCG tablet Take 1 tablet by mouth Daily. (Patient taking differently: Take 1 tablet by mouth Every Morning.) 90 tablet 1     mupirocin (BACTROBAN) 2 % ointment Apply 1 application  topically to the appropriate area as directed Daily. Applied to fistula insertion sites for dialysis       ondansetron (ZOFRAN) 8 MG tablet TAKE ONE TABLET BY MOUTH EVERY 8 HOURS AS NEEDED FOR NAUSEA OR VOMITING 18 tablet 2     sertraline (ZOLOFT) 100 MG tablet TAKE 1 AND 1/2 TABLET BY MOUTH DAILY 135 tablet 0     vitamin B-12 (CYANOCOBALAMIN) 1000 MCG tablet Take 1 tablet by mouth Daily.          Allergies:  No Known Allergies  Immunizations:  Immunization History   Administered Date(s) Administered    COVID-19 (MODERNA) 1st,2nd,3rd Dose Monovalent 02/26/2021, 03/05/2021, 03/26/2021, 09/10/2021    COVID-19 (MODERNA) Monovalent Original Booster 04/21/2022    Flu Vaccine Quad PF >36MO 10/16/2017    Fluzone (or Fluarix & Flulaval for VFC) >6mos 10/16/2017, 01/24/2020, 09/18/2020, 10/16/2021    Influenza, Unspecified 02/07/2020    PPD Test 05/06/2021, 05/25/2021, 09/07/2022, 01/17/2023    Pneumococcal Polysaccharide (PPSV23) 09/18/2020    flucelvax quad pfs =>4 YRS 11/15/2022     Social History:   Social History     Social History Narrative     Not on file     Social History     Socioeconomic History    Marital status:      Spouse name: Rk   Tobacco Use    Smoking status: Never    Smokeless tobacco: Never   Vaping Use    Vaping Use: Never used   Substance and Sexual Activity    Alcohol use: No    Drug use: Never    Sexual activity: Yes     Partners: Male     Birth control/protection: Post-menopausal       Family History:  Family History   Problem Relation Age of Onset    Heart attack Mother     Coronary artery disease Mother         Mother  from MI at 72    Diabetes Mother     Breast cancer Mother     Hyperlipidemia Mother     Arthritis Mother     Cancer Mother     Heart attack Father     Aortic aneurysm Father         Father with ruptured thoracic aortic aneurysm    Coronary artery disease Father         Father  from MI at 74    Heart disease Father     Hyperlipidemia Father     Arthritis Father     Heart attack Maternal Grandmother     Coronary artery disease Maternal Grandmother         MGM deceeased from MI at age 76    Malig Hyperthermia Neg Hx         Review of Systems  See history of present illness and past medical history.  Patient denies headache, dizziness, syncope, falls, trauma, change in vision, change in hearing, change in taste, changes in weight, changes in appetite, focal weakness, numbness, or paresthesia.  Patient denies chest pain, palpitations, dyspnea, orthopnea, PND, cough, sinus pressure, rhinorrhea, epistaxis, hemoptysis,  ,hematemesis,  constipation or hematochezia.  Denies cold or heat intolerance, polydipsia, polyuria, polyphagia. Denies hematuria, pyuria, dysuria, hesitancy, frequency or urgency. Denies consumption of raw and under cooked meats foods or change in water source.       Objective:  T Max 24 hrs: Temp (24hrs), Av.8 °F (36.6 °C), Min:97.5 °F (36.4 °C), Max:98.1 °F (36.7 °C)    Vitals Ranges:   Temp:  [97.5 °F (36.4 °C)-98.1 °F (36.7 °C)] 98.1 °F (36.7 °C)  Heart Rate:  []  "82  Resp:  [16-18] 16  BP: (115-133)/(47-59) 118/53      Exam:  /53 (BP Location: Right arm, Patient Position: Lying)   Pulse 82   Temp 98.1 °F (36.7 °C) (Oral)   Resp 16   Ht 170.2 cm (67\")   Wt (!) 172 kg (380 lb)   LMP  (LMP Unknown)   SpO2 92%   BMI 59.52 kg/m²     General Appearance:    Alert, cooperative, no distress, appears stated age   Head:    Normocephalic, without obvious abnormality, atraumatic   Eyes:    PERRL, conjunctivae/corneas clear, EOM's intact, both eyes   Ears:    Normal external ear canals, both ears   Nose:   Nares normal, septum midline, mucosa normal, no drainage    or sinus tenderness   Throat:   Lips, mucosa, and tongue normal   Neck:   Supple, symmetrical, trachea midline, no adenopathy;     thyroid:  no enlargement/tenderness/nodules; no carotid    bruit or JVD   Back:     Symmetric, no curvature, ROM normal, no CVA tenderness   Lungs:     Decreased breath sounds bilaterally, respirations unlabored   Chest Wall:    No tenderness or deformity    Heart:    Regular rate and rhythm, S1 and S2 normal, no murmur, rub   or gallop   Abdomen:     Soft, nontender, bowel sounds active all four quadrants,     no masses, no hepatomegaly, no splenomegaly   Extremities:   Extremities normal, atraumatic, no cyanosis or edema                       .    Data Review:  Labs in chart were reviewed.  WBC   Date Value Ref Range Status   09/28/2023 2.64 (L) 3.40 - 10.80 10*3/mm3 Final     Hemoglobin   Date Value Ref Range Status   09/28/2023 7.4 (L) 12.0 - 15.9 g/dL Final     Hematocrit   Date Value Ref Range Status   09/28/2023 22.6 (L) 34.0 - 46.6 % Final     Platelets   Date Value Ref Range Status   09/28/2023 110 (L) 140 - 450 10*3/mm3 Final     Sodium   Date Value Ref Range Status   09/28/2023 137 136 - 145 mmol/L Final     Potassium   Date Value Ref Range Status   09/28/2023 2.8 (L) 3.5 - 5.2 mmol/L Final     Chloride   Date Value Ref Range Status   09/28/2023 95 (L) 98 - 107 mmol/L Final "     CO2   Date Value Ref Range Status   09/28/2023 27.0 22.0 - 29.0 mmol/L Final     BUN   Date Value Ref Range Status   09/28/2023 26 (H) 8 - 23 mg/dL Final     Creatinine   Date Value Ref Range Status   09/28/2023 6.85 (H) 0.57 - 1.00 mg/dL Final     Glucose   Date Value Ref Range Status   09/28/2023 178 (H) 65 - 99 mg/dL Final     Calcium   Date Value Ref Range Status   09/28/2023 8.7 8.6 - 10.5 mg/dL Final     Magnesium   Date Value Ref Range Status   09/28/2023 1.9 1.6 - 2.4 mg/dL Final                Imaging Results (All)       Procedure Component Value Units Date/Time    CT Abdomen Pelvis Without Contrast [[id number]] Collected: 09/28/23 1514     Updated: 09/28/23 1522    Narrative:      Examination: CT of the abdomen and pelvis without contrast     Technique: CT of the abdomen and pelvis without contrast per protocol.  Radiation dose reduction techniques were utilized, including automated  exposure control and exposure modulation based on body size.        History: Abdominal pain, cancer, and diarrhea     Comparison: PET/CT of 10/6/2022.     Bone windows demonstrate degenerative changes, without suspicious  osseous lesion seen.       Impression:      Large amount of ascites. Incidental findings as above.     Findings: Limited evaluation of the inferior thorax demonstrates  atelectasis, without consolidation, pleural effusion, or pneumothorax.  The heart is mildly enlarged, without pericardial effusion. Mitral  annular calcifications are seen.     There is a large amount of ascites. The kidneys are atrophic. There is a  left-sided double-J ureteral stent.     The liver, spleen, adrenal glands, pancreas, stomach, small bowel, large  bowel, uterus, and abdominal vasculature are normal as evaluated on this  noncontrast examination. No intraperitoneal free gas is seen. No  enlarged lymph nodes are demonstrated. Bilateral external iliac chain  lymph nodes are smaller, measuring up to 9 mm in short axis diameter.      Bone windows demonstrate degenerative changes, without suspicious  osseous lesion seen.     IMPRESSION:  1. Large amount of ascites, new     2. Smaller external iliac chain lymph nodes     3. Incidental findings as above.     This report was finalized on 9/28/2023 3:19 PM by Dr. Ashish Farmer M.D.                 Assessment:  Active Hospital Problems    Diagnosis  POA    **Intractable vomiting [R11.10]  Yes      Resolved Hospital Problems   No resolved problems to display.   Diarrhea  Hypokalemia  Esrd  Breast cancer  Neutropenia  Diabetes  Obesity   hypertension    Plan:  Nephrology to see  Antiemetics, fluids  Accu checks, insulin sliding scale  K run given by ed  Id to see  Dw patient and ed provider    Mary Rose MD  9/28/2023  23:24 EDT

## 2023-09-29 NOTE — PAYOR COMM NOTE
"Isabel Azael DIANNA (62 y.o. Female)      REQUESTING INPATIENT AUTHORIZATION:  [id number]     REPLY TO UR DEPT: [phone],   [phone]    [facility]: NPI [id number] [facility]# [id number]    WALDEMAR ZAPATA RN,UR,CCP     Date of Birth   1960    Social Security Number       Address   [address] [place] [address]    Home Phone   [phone]    MRN   [id number]       Congregational   Patient Refused    Marital Status                               Admission Date   9/28/23    Admission Type   Emergency    Admitting Provider   Mary Rose MD    Attending Provider   Jason Hernandez MD    Department, Room/Bed   [address], E663/1       Discharge Date       Discharge Disposition       Discharge Destination                                 Attending Provider: Jason Hernandez MD    Allergies: No Known Allergies    Isolation: Spore   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), C.difficile (09/29/23)   Code Status: CPR    Ht: 170.2 cm (67\")   Wt: 172 kg (379 lb 3.1 oz)    Admission Cmt: None   Principal Problem: Intractable vomiting [R11.10]                   Active Insurance as of 9/28/2023       Primary Coverage       Payor Plan Insurance Group Employer/Plan Group    [facility] [employer] EMPLOYEE [id number]       Payor Plan Address Payor Plan Phone Number Payor Plan Fax Number Effective Dates    [phone]  1/1/2022 - None Entered    [place] [address]         Subscriber Name Subscriber Birth Date Member ID       AZAEL ALEXANDER 1960 [id number]                     Emergency Contacts        (Rel.) Home Phone Work Phone Mobile Phone    [place] (Spouse) [phone] -- [phone]                 History & Physical        Mary Rose MD at 09/28/23 2323          HISTORY AND PHYSICAL   [facility]        Date of Admission: 9/28/2023  Patient Identification:  Name: Azael BUSTAMANTE " Isabel  Age: 62 y.o.  Sex: female  :  1960  MRN: [id number]                     Primary Care Physician: Kiana Noriega APRN ([place])    Chief Complaint:  62 year old female who presented to the emergency room with nausaa and vomiting and diarrhea for the last few days; she has been unable to keep down anything po; she has a history of breast cancer and esrd; she has a history of c diff colitis but is felt to be a carrier rather than actively infected at this point; she has had subjective fever or chills; she has had malaise and fatigue    History of Present Illness:   As above    Past Medical History:  Past Medical History:   Diagnosis Date    Anemia     Anxiety and depression     Bicuspid aortic valve     had surgery    Breast cancer     RIGHT, HAD NEELA. MASTECTOMY, CHEMO AND RADIATION    CHF (congestive heart failure)     CKD (chronic kidney disease)     dialysis    COVID 2023    Diabetes mellitus     Dialysis patient     pt does at home    Elevated cholesterol     Frequent UTI     last 6 months    Heart murmur     History of cancer chemotherapy     History of hypertension     due to low b/p was taken off meds    History of kidney stones     History of MRSA infection     POST BREAST SURGERY-TREATED [facility]    History of radiation therapy     2 times [id number] for breast cancer   and  for omentum cancer    History of sepsis     KIDNEY STONE    History of transfusion     no reaction    Hyperlipidemia     Hyperthyroidism     Hypothyroidism     Kidney stone     CURRENT LEFT-DEC 2021    Lymphedema of leg     LEFT    Metastasis from breast cancer     STOMACH-OMENTUM    Neuromuscular disorder     Obesity     ANDREW on CPAP     CPAP    Osteoarthrosis     Peripheral neuropathy     FEET BILAT    Radiculopathy     Rectal bleeding 2022    Thickened endometrium     Urinary incontinence     wears pads    Weakness     BILAT LEGS-WITH ANY AMT OF TIME     Past Surgical History:  Past Surgical  History:   Procedure Laterality Date    AORTIC VALVULOPLASTY  2023    ARTERIOVENOUS FISTULA/SHUNT SURGERY Left 2021    Procedure: LEFT  BRACHIOCEPALIC ARTERIOVENOUS FISTULA;  Surgeon: Dejuan Adler MD;  Location: [facility] MAIN OR;  Service: Vascular;  Laterality: Left;    BREAST RECONSTRUCTION      WITH IMPLANTS, AND REVISION, NOW REMOVED    BREAST SURGERY Bilateral 2004    breast implants removed and then replaced due to infection    CARDIAC CATHETERIZATION N/A 2022    Procedure: CORONARY ANGIOGRAPHY;  Surgeon: Luis Rivers MD;  Location:  [facility] CATH INVASIVE LOCATION;  Service: Cardiology;  Laterality: N/A;    CARDIAC CATHETERIZATION N/A 2022    Procedure: Right Heart Cath;  Surgeon: Luis Rivers MD;  Location:  [facility];  Service: Cardiology;  Laterality: N/A;    CARDIAC CATHETERIZATION N/A 01/10/2023    Procedure: Valvuloplasty;  Surgeon: Luis Rivers MD;  Location:  [facility];  Service: Cardiovascular;  Laterality: N/A;  01/10/2023    CARDIAC CATHETERIZATION N/A 01/10/2023    Procedure: Aortic root aortogram;  Surgeon: Luis Rivers MD;  Location:  [facility]VASIVE [facility];  Service: Cardiovascular;  Laterality: N/A;    CATARACT EXTRACTION WITH INTRAOCULAR LENS IMPLANT Bilateral      SECTION  1987     SECTION  1987    CYSTOSCOPY W/ URETERAL STENT PLACEMENT      CYSTOSCOPY W/ URETERAL STENT PLACEMENT Left 2021    Procedure: CYSTOSCOPY KEFT RETROGRADE PYELOGRAM  LEFT  URETERAL STENT PLACEMENT;  Surgeon: Leodan Mckee Jr., MD;  Location: [facility] MAIN OR;  Service: Urology;  Laterality: Left;    CYSTOSCOPY W/ URETERAL STENT PLACEMENT Left 03/10/2022    Procedure: CYSTOSCOPY AND LEFT URETERAL STENT EXCHANGE, RETROGRADE PYELOGRAM;  Surgeon: Leodan Mckee Jr., MD;  Location: [facility] MAIN OR;  Service: Urology;  Laterality: Left;    CYSTOSCOPY W/  URETERAL STENT PLACEMENT Left 5/5/2023    Procedure: CYSTOSCOPY WITH LEFT URETERAL STENT PLACEMENT, RETROGRADE PYELOGRAM, CLOT EVACUATION, BLADDER FULGARATION;  Surgeon: Leodan Mckee Jr., MD;  Location: [facility] MAIN OR;  Service: Urology;  Laterality: Left;    D & C HYSTEROSCOPY N/A 05/22/2017    Procedure: DILATATION AND CURETTAGE, HYSTEROSCOPY ;  Surgeon: Cherie Oliveros MD;  Location:  [facility];  Service:     D & C HYSTEROSCOPY N/A 09/19/2019    Procedure: DILATATION AND CURETTAGE HYSTEROSCOPY/POLYPECTOMY;  Surgeon: Cherie Oliveros MD;  Location:  [facility];  Service: Gynecology    D & C HYSTEROSCOPY ENDOMETRIAL ABLATION N/A 5/5/2023    Procedure: DILATATION AND CURETTAGE;  Surgeon: Ina Marcos MD;  Location: [facility] MAIN OR;  Service: Obstetrics/Gynecology;  Laterality: N/A;    ENDOSCOPY      INSERTION AND REMOVAL HEMODIALYSIS CATHETER Right 05/01/2021    INSERTION HEMODIALYSIS CATHETER Right 05/01/2021    Procedure: HEMODIALYSIS CATHETER INSERTION;  Surgeon: Clint Hernández MD;  Location: [facility] MAIN OR;  Service: Vascular;  Laterality: Right;    LYMPH NODE BIOPSY      MASTECTOMY Bilateral 2004    VENOUS ACCESS DEVICE (PORT) INSERTION AND REMOVAL        Home Meds:  Medications Prior to Admission   Medication Sig Dispense Refill Last Dose    Abemaciclib (Verzenio) 100 MG tablet Take 1 tablet by mouth 2 (Two) Times a Day. 56 tablet 5 Past Month    atorvastatin (LIPITOR) 40 MG tablet TAKE ONE TABLET BY MOUTH ONCE NIGHTLY 30 tablet 0 9/27/2023    diphenoxylate-atropine (LOMOTIL) 2.5-0.025 MG per tablet Take 1 tablet by mouth 4 (Four) Times a Day As Needed for Diarrhea. 30 tablet 0 9/27/2023    insulin detemir (Levemir FlexPen) 100 UNIT/ML injection Inject 50 Units under the skin into the appropriate area as directed Daily. 11 mL 0 Past Week    vancomycin (Vancocin) 125 MG capsule Take 1 capsule by mouth 4 (Four) Times a Day for 14 days. 56 capsule 0 9/27/2023    acetaminophen (TYLENOL) 500 MG  tablet Take 2 tablets by mouth As Needed for Mild Pain.       folic acid (FOLVITE) 1 MG tablet TAKE 1 TABLET BY MOUTH DAILY 30 tablet 2     Fulvestrant (FASLODEX) 250 MG/5ML chemo syringe Inject  into the appropriate muscle as directed by prescriber Every 30 (Thirty) Days. In MD office       gabapentin (Neurontin) 300 MG capsule Take 1 capsule by mouth Every Night. 60 capsule 2     HYDROcodone-acetaminophen (NORCO) 5-325 MG per tablet Take 1-2 tablets by mouth Every 4 (Four) Hours As Needed for Moderate Pain (Pain). 12 tablet 0     levothyroxine (SYNTHROID, LEVOTHROID) 50 MCG tablet Take 1 tablet by mouth Daily. (Patient taking differently: Take 1 tablet by mouth Every Morning.) 90 tablet 1     mupirocin (BACTROBAN) 2 % ointment Apply 1 application  topically to the appropriate area as directed Daily. Applied to fistula insertion sites for dialysis       ondansetron (ZOFRAN) 8 MG tablet TAKE ONE TABLET BY MOUTH EVERY 8 HOURS AS NEEDED FOR NAUSEA OR VOMITING 18 tablet 2     sertraline (ZOLOFT) 100 MG tablet TAKE 1 AND 1/2 TABLET BY MOUTH DAILY 135 tablet 0     vitamin B-12 (CYANOCOBALAMIN) 1000 MCG tablet Take 1 tablet by mouth Daily.          Allergies:  No Known Allergies  Immunizations:  Immunization History   Administered Date(s) Administered    COVID-19 (MODERNA) 1st,2nd,3rd Dose Monovalent 02/26/2021, 03/05/2021, 03/26/2021, 09/10/2021    COVID-19 (MODERNA) Monovalent Original Booster 04/21/2022    Flu Vaccine Quad PF >36MO 10/16/2017    Fluzone (or Fluarix & Flulaval for VFC) >6mos 10/16/2017, 01/24/2020, 09/18/2020, 10/16/2021    Influenza, Unspecified 02/07/2020    PPD Test 05/06/2021, 05/25/2021, 09/07/2022, 01/17/2023    Pneumococcal Polysaccharide (PPSV23) 09/18/2020    flucelvax quad pfs =>4 YRS 11/15/2022     Social History:   Social History     Social History Narrative    Not on file     Social History     Socioeconomic History    Marital status:      Spouse name: Rk   Tobacco Use     Smoking status: Never    Smokeless tobacco: Never   Vaping Use    Vaping Use: Never used   Substance and Sexual Activity    Alcohol use: No    Drug use: Never    Sexual activity: Yes     Partners: Male     Birth control/protection: Post-menopausal       Family History:  Family History   Problem Relation Age of Onset    Heart attack Mother     Coronary artery disease Mother         Mother  from MI at 72    Diabetes Mother     Breast cancer Mother     Hyperlipidemia Mother     Arthritis Mother     Cancer Mother     Heart attack Father     Aortic aneurysm Father         Father with ruptured thoracic aortic aneurysm    Coronary artery disease Father         Father  from MI at 74    Heart disease Father     Hyperlipidemia Father     Arthritis Father     Heart attack Maternal Grandmother     Coronary artery disease Maternal Grandmother         MGM deceeased from MI at age 76    Malig Hyperthermia Neg Hx         Review of Systems  See history of present illness and past medical history.  Patient denies headache, dizziness, syncope, falls, trauma, change in vision, change in hearing, change in taste, changes in weight, changes in appetite, focal weakness, numbness, or paresthesia.  Patient denies chest pain, palpitations, dyspnea, orthopnea, PND, cough, sinus pressure, rhinorrhea, epistaxis, hemoptysis,  ,hematemesis,  constipation or hematochezia.  Denies cold or heat intolerance, polydipsia, polyuria, polyphagia. Denies hematuria, pyuria, dysuria, hesitancy, frequency or urgency. Denies consumption of raw and under cooked meats foods or change in water source.       Objective:  T Max 24 hrs: Temp (24hrs), Av.8 °F (36.6 °C), Min:97.5 °F (36.4 °C), Max:98.1 °F (36.7 °C)    Vitals Ranges:   Temp:  [97.5 °F (36.4 °C)-98.1 °F (36.7 °C)] 98.1 °F (36.7 °C)  Heart Rate:  [] 82  Resp:  [16-18] 16  BP: (115-133)/(47-59) 118/53      Exam:  /53 (BP Location: Right arm, Patient Position: Lying)   Pulse  "82   Temp 98.1 °F (36.7 °C) (Oral)   Resp 16   Ht 170.2 cm (67\")   Wt (!) 172 kg (380 lb)   LMP  (LMP Unknown)   SpO2 92%   BMI 59.52 kg/m²     General Appearance:    Alert, cooperative, no distress, appears stated age   Head:    Normocephalic, without obvious abnormality, atraumatic   Eyes:    PERRL, conjunctivae/corneas clear, EOM's intact, both eyes   Ears:    Normal external ear canals, both ears   Nose:   Nares normal, septum midline, mucosa normal, no drainage    or sinus tenderness   Throat:   Lips, mucosa, and tongue normal   Neck:   Supple, symmetrical, trachea midline, no adenopathy;     thyroid:  no enlargement/tenderness/nodules; no carotid    bruit or JVD   Back:     Symmetric, no curvature, ROM normal, no CVA tenderness   Lungs:     Decreased breath sounds bilaterally, respirations unlabored   Chest Wall:    No tenderness or deformity    Heart:    Regular rate and rhythm, S1 and S2 normal, no murmur, rub   or gallop   Abdomen:     Soft, nontender, bowel sounds active all four quadrants,     no masses, no hepatomegaly, no splenomegaly   Extremities:   Extremities normal, atraumatic, no cyanosis or edema                       .    Data Review:  Labs in chart were reviewed.  WBC   Date Value Ref Range Status   09/28/2023 2.64 (L) 3.40 - 10.80 10*3/mm3 Final     Hemoglobin   Date Value Ref Range Status   09/28/2023 7.4 (L) 12.0 - 15.9 g/dL Final     Hematocrit   Date Value Ref Range Status   09/28/2023 22.6 (L) 34.0 - 46.6 % Final     Platelets   Date Value Ref Range Status   09/28/2023 110 (L) 140 - 450 10*3/mm3 Final     Sodium   Date Value Ref Range Status   09/28/2023 137 136 - 145 mmol/L Final     Potassium   Date Value Ref Range Status   09/28/2023 2.8 (L) 3.5 - 5.2 mmol/L Final     Chloride   Date Value Ref Range Status   09/28/2023 95 (L) 98 - 107 mmol/L Final     CO2   Date Value Ref Range Status   09/28/2023 27.0 22.0 - 29.0 mmol/L Final     BUN   Date Value Ref Range Status   09/28/2023 " 26 (H) 8 - 23 mg/dL Final     Creatinine   Date Value Ref Range Status   09/28/2023 6.85 (H) 0.57 - 1.00 mg/dL Final     Glucose   Date Value Ref Range Status   09/28/2023 178 (H) 65 - 99 mg/dL Final     Calcium   Date Value Ref Range Status   09/28/2023 8.7 8.6 - 10.5 mg/dL Final     Magnesium   Date Value Ref Range Status   09/28/2023 1.9 1.6 - 2.4 mg/dL Final                Imaging Results (All)       Procedure Component Value Units Date/Time    CT Abdomen Pelvis Without Contrast [[id number]] Collected: 09/28/23 1514     Updated: 09/28/23 1522    Narrative:      Examination: CT of the abdomen and pelvis without contrast     Technique: CT of the abdomen and pelvis without contrast per protocol.  Radiation dose reduction techniques were utilized, including automated  exposure control and exposure modulation based on body size.        History: Abdominal pain, cancer, and diarrhea     Comparison: PET/CT of 10/6/2022.     Bone windows demonstrate degenerative changes, without suspicious  osseous lesion seen.       Impression:      Large amount of ascites. Incidental findings as above.     Findings: Limited evaluation of the inferior thorax demonstrates  atelectasis, without consolidation, pleural effusion, or pneumothorax.  The heart is mildly enlarged, without pericardial effusion. Mitral  annular calcifications are seen.     There is a large amount of ascites. The kidneys are atrophic. There is a  left-sided double-J ureteral stent.     The liver, spleen, adrenal glands, pancreas, stomach, small bowel, large  bowel, uterus, and abdominal vasculature are normal as evaluated on this  noncontrast examination. No intraperitoneal free gas is seen. No  enlarged lymph nodes are demonstrated. Bilateral external iliac chain  lymph nodes are smaller, measuring up to 9 mm in short axis diameter.     Bone windows demonstrate degenerative changes, without suspicious  osseous lesion seen.     IMPRESSION:  1. Large amount of  ascites, new     2. Smaller external iliac chain lymph nodes     3. Incidental findings as above.     This report was finalized on 9/28/2023 3:19 PM by Dr. Ashish Farmer M.D.                 Assessment:  Active Hospital Problems    Diagnosis  POA    **Intractable vomiting [R11.10]  Yes      Resolved Hospital Problems   No resolved problems to display.   Diarrhea  Hypokalemia  Esrd  Breast cancer  Neutropenia  Diabetes  Obesity   hypertension    Plan:  Nephrology to see  Antiemetics, fluids  Accu checks, insulin sliding scale  K run given by ed  Id to see  Dw patient and ed provider    Mary Rose MD  9/28/2023  23:24 EDT      Electronically signed by Mary Rose MD at 09/28/23 2330          Emergency Department Notes            Florence Ledezma, RN at 09/28/23 1614          F/C attempted x 2. Unsuccessful. Dr. Guzman notified.     Electronically signed by Florence Ledezma RN at 09/28/23 1614       Florence Ledezma, RN at 09/28/23 1409          Patient states that she does not urinate that much due to being on hemodialysis - is unable to urinate at this time. Patient states that she has not had a bowel movement since 4pm yesterday.        Electronically signed by Florence Ledezma RN at 09/28/23 1411       Florence Ledezma RN at 09/28/23 1333          Patient states she was diagnosed with cdiff yesterday and has been vomiting for 24 hours. Patient states she takes hemodialysis at home 5 days per week.     Electronically signed by Florence Ledezma RN at 09/28/23 1334       Howard Guzman MD at 09/28/23 1248        Procedure Orders    1. Critical Care [[id number]] ordered by Howard Guzman MD                  EMERGENCY DEPARTMENT ENCOUNTER    Room Number:  28/28  Date seen:  9/28/2023  PCP: Kiana Noriega APRN ([place])  Historian: Patient      HPI:  Chief Complaint: Vomiting and diarrhea  Context: Juana Hall is a 62 y.o. female who presents to the ED c/o vomiting and diarrhea.  The patient has  a history of metastatic breast cancer.  Yesterday her oncologist ordered a GI panel and C. difficile.  The GI panel was negative but the C. difficile was positive.  He ordered the patient oral vancomycin because she has taken this for C. difficile in the past.  However the patient has not been able to keep anything down including pills or water for the past 24 hours.  She has had severe diarrhea.  She called her oncologist back who advised her to come to the emergency room for further evaluation and care.  The patient states that her symptoms started on Monday and currently the vomiting is worse than the diarrhea.  She states this is the third time that she has had C. difficile with the last one being several months ago.  She states she has not had any recent antibiotics.  She reports subjective fevers and chills.  She reports mild abdominal cramping.  Patient does have end-stage renal disease and is on home hemodialysis 5 days/week.  Dr. Ledesma is her nephrologist.      PAST MEDICAL HISTORY  Active Ambulatory Problems     Diagnosis Date Noted    Anxiety     Depression     Diabetes type 2, controlled     Hyperlipidemia     Heart murmur     Hypertension     Post-traumatic osteoarthritis of multiple joints     Psoriasis     Radiculopathy     Acquired hypothyroidism 09/26/2016    Peripheral neuropathy 02/25/2019    Normocytic anemia 07/22/2019    History of right breast cancer 07/22/2019    Omental mass 07/22/2019    Primary malignant neoplasm of breast with metastasis 08/12/2019    Elevated serum creatinine 09/10/2019    Iron deficiency anemia 08/07/2020    Anemia in stage 3 chronic kidney disease 09/04/2020    Stage 3b chronic kidney disease 03/10/2021    Anxiety and depression     RYAN (acute kidney injury) 04/29/2021    Anemia, chronic disease 04/29/2021    Diastolic CHF, chronic 04/29/2021    Frequent UTI     Metabolic acidosis 04/29/2021    Severe malnutrition 04/30/2021    Hydronephrosis 12/16/2021    Generalized  weakness 01/28/2022    COVID-19 virus detected 01/28/2022    Hypocalcemia 01/28/2022    Morbid obesity with BMI of 50.0-59.9, adult 01/28/2022    ESRD (end stage renal disease) 01/28/2022    Pancytopenia due to chemotherapy 01/28/2022    Chronic anemia 08/16/2022    Rectal bleeding 08/16/2022    Bicuspid aortic valve 08/18/2022    Complicated UTI (urinary tract infection) 01/04/2023    Acute UTI (urinary tract infection) 01/04/2023    Severe aortic stenosis 01/04/2023    Combined systolic and diastolic congestive heart failure 01/04/2023    Depression 01/04/2023    ANDREW (obstructive sleep apnea) 01/04/2023    Diastolic CHF, chronic 01/05/2023    Adverse effect of chemotherapy 01/05/2023    UTI (urinary tract infection) 01/30/2023    Hematuria 04/13/2023    Dysuria 07/19/2023     Resolved Ambulatory Problems     Diagnosis Date Noted    Nonrheumatic aortic valve stenosis 08/16/2022    Peritoneal dialysis status 01/04/2023    Hypokalemia 01/04/2023     Past Medical History:   Diagnosis Date    Anemia     Breast cancer 2004    CHF (congestive heart failure)     CKD (chronic kidney disease)     COVID 01/2023    Diabetes mellitus     Dialysis patient     Elevated cholesterol     History of cancer chemotherapy 2005    History of hypertension 2023    History of kidney stones     History of MRSA infection 2005    History of radiation therapy     History of sepsis 2010    History of transfusion     Hyperthyroidism     Hypothyroidism     Kidney stone     Lymphedema of leg     Metastasis from breast cancer 2019    Neuromuscular disorder     Obesity     ANDREW on CPAP     Osteoarthrosis     Thickened endometrium     Urinary incontinence     Weakness          REVIEW OF SYSTEMS  All systems reviewed and negative except for those discussed in HPI.       PAST SURGICAL HISTORY  Past Surgical History:   Procedure Laterality Date    AORTIC VALVULOPLASTY  01/2023    ARTERIOVENOUS FISTULA/SHUNT SURGERY Left 11/03/2021    Procedure: LEFT   BRACHIOCEPALIC ARTERIOVENOUS FISTULA;  Surgeon: Dejuan Adler MD;  Location: [facility] MAIN OR;  Service: Vascular;  Laterality: Left;    BREAST RECONSTRUCTION      WITH IMPLANTS, AND REVISION, NOW REMOVED    BREAST SURGERY Bilateral     breast implants removed and then replaced due to infection    CARDIAC CATHETERIZATION N/A 2022    Procedure: CORONARY ANGIOGRAPHY;  Surgeon: Luis Rivers MD;  [facility];  Service: Cardiology;  Laterality: N/A;    CARDIAC CATHETERIZATION N/A 2022    Procedure: Right Heart Cath;  Surgeon: Luis Rivers MD;  [facility];  Service: Cardiology;  Laterality: N/A;    CARDIAC CATHETERIZATION N/A 01/10/2023    Procedure: Valvuloplasty;  Surgeon: Luis Rivers MD;  [facility];  Service: Cardiovascular;  Laterality: N/A;  01/10/2023    CARDIAC CATHETERIZATION N/A 01/10/2023    Procedure: Aortic root aortogram;  Surgeon: Luis Rivers MD;  [facility];  Service: Cardiovascular;  Laterality: N/A;    CATARACT EXTRACTION WITH INTRAOCULAR LENS IMPLANT Bilateral      SECTION  1987     SECTION  1987    CYSTOSCOPY W/ URETERAL STENT PLACEMENT      CYSTOSCOPY W/ URETERAL STENT PLACEMENT Left 2021    Procedure: CYSTOSCOPY KEFT RETROGRADE PYELOGRAM  LEFT  URETERAL STENT PLACEMENT;  Surgeon: Leodan Mckee Jr., MD;  Location: [facility] OR;  Service: Urology;  Laterality: Left;    CYSTOSCOPY W/ URETERAL STENT PLACEMENT Left 03/10/2022    Procedure: CYSTOSCOPY AND LEFT URETERAL STENT EXCHANGE, RETROGRADE PYELOGRAM;  Surgeon: Leodan Mckee Jr., MD;  Location: [facility] OR;  Service: Urology;  Laterality: Left;    CYSTOSCOPY W/ URETERAL STENT PLACEMENT Left 2023    Procedure: CYSTOSCOPY WITH LEFT URETERAL STENT PLACEMENT, RETROGRADE PYELOGRAM, CLOT EVACUATION, BLADDER  FULGARATION;  Surgeon: Leodan Mckee Jr., MD;  [facility];  Service: Urology;  Laterality: Left;    D & C HYSTEROSCOPY N/A 2017    Procedure: DILATATION AND CURETTAGE, HYSTEROSCOPY ;  Surgeon: Cherie Oliveros MD;  Location:  [facility];  Service:     D & C HYSTEROSCOPY N/A 2019    Procedure: DILATATION AND CURETTAGE HYSTEROSCOPY/POLYPECTOMY;  Surgeon: Cherie Oliveros MD;  Location:  [facility];  Service: Gynecology    D & C HYSTEROSCOPY ENDOMETRIAL ABLATION N/A 2023    Procedure: DILATATION AND CURETTAGE;  Surgeon: Ina Marcos MD;  [facility];  Service: Obstetrics/Gynecology;  Laterality: N/A;    ENDOSCOPY      INSERTION AND REMOVAL HEMODIALYSIS CATHETER Right 2021    INSERTION HEMODIALYSIS CATHETER Right 2021    Procedure: HEMODIALYSIS CATHETER INSERTION;  Surgeon: Clint Hernández MD;  Location: [facility] MAIN OR;  Service: Vascular;  Laterality: Right;    LYMPH NODE BIOPSY      MASTECTOMY Bilateral     VENOUS ACCESS DEVICE (PORT) INSERTION AND REMOVAL           FAMILY HISTORY  Family History   Problem Relation Age of Onset    Heart attack Mother     Coronary artery disease Mother         Mother  from MI at 72    Diabetes Mother     Breast cancer Mother     Hyperlipidemia Mother     Arthritis Mother     Cancer Mother     Heart attack Father     Aortic aneurysm Father         Father with ruptured thoracic aortic aneurysm    Coronary artery disease Father         Father  from MI at 74    Heart disease Father     Hyperlipidemia Father     Arthritis Father     Heart attack Maternal Grandmother     Coronary artery disease Maternal Grandmother         MGM deceeased from MI at age 76    Malig Hyperthermia Neg Hx          SOCIAL HISTORY  Social History     Socioeconomic History    Marital status:      Spouse name: Rk   Tobacco Use    Smoking status: Never    Smokeless tobacco: Never   Vaping Use    Vaping Use: Never used    Substance and Sexual Activity    Alcohol use: No    Drug use: Never    Sexual activity: Yes     Partners: Male     Birth control/protection: Post-menopausal         ALLERGIES  Patient has no known allergies.      PHYSICAL EXAM  ED Triage Vitals   Temp Heart Rate Resp BP SpO2   09/28/23 1148 09/28/23 1148 09/28/23 1148 09/28/23 1159 09/28/23 1148   97.5 °F (36.4 °C) 100 18 115/51 94 %      Temp src Heart Rate Source Patient Position BP Location FiO2 (%)   -- -- -- -- --              Physical Exam      GENERAL: 62-year-old female who appears in mild distress distress  HENT: NCAT: nares patent: Neck supple dry mucous membranes  EYES: no scleral icterus  CV: regular rhythm, normal rate at 100  RESPIRATORY: normal effort  ABDOMEN: soft, NTND: Bowel sounds diminished  MUSCULOSKELETAL: no deformity  NEURO: alert with nonfocal neuro exam  PSYCH:  calm, cooperative  SKIN: warm, dry    Vital signs and nursing notes reviewed.      LAB RESULTS  Recent Results (from the past 24 hour(s))   Comprehensive Metabolic Panel    Collection Time: 09/28/23  1:41 PM    Specimen: Blood   Result Value Ref Range    Glucose 178 (H) 65 - 99 mg/dL    BUN 26 (H) 8 - 23 mg/dL    Creatinine 6.85 (H) 0.57 - 1.00 mg/dL    Sodium 137 136 - 145 mmol/L    Potassium 2.8 (L) 3.5 - 5.2 mmol/L    Chloride 95 (L) 98 - 107 mmol/L    CO2 27.0 22.0 - 29.0 mmol/L    Calcium 8.7 8.6 - 10.5 mg/dL    Total Protein 6.9 6.0 - 8.5 g/dL    Albumin 2.7 (L) 3.5 - 5.2 g/dL    ALT (SGPT) 11 1 - 33 U/L    AST (SGOT) 12 1 - 32 U/L    Alkaline Phosphatase 104 39 - 117 U/L    Total Bilirubin 0.6 0.0 - 1.2 mg/dL    Globulin 4.2 gm/dL    A/G Ratio 0.6 g/dL    BUN/Creatinine Ratio 3.8 (L) 7.0 - 25.0    Anion Gap 15.0 5.0 - 15.0 mmol/L    eGFR 6.3 (L) >60.0 mL/min/1.73   Protime-INR    Collection Time: 09/28/23  1:41 PM    Specimen: Blood   Result Value Ref Range    Protime 16.3 (H) 11.7 - 14.2 Seconds    INR 1.29 (H) 0.90 - 1.10   Lipase    Collection Time: 09/28/23  1:41 PM     Specimen: Blood   Result Value Ref Range    Lipase 22 13 - 60 U/L   Lactic Acid, Plasma    Collection Time: 09/28/23  1:41 PM    Specimen: Blood   Result Value Ref Range    Lactate 1.2 0.5 - 2.0 mmol/L   Procalcitonin    Collection Time: 09/28/23  1:41 PM    Specimen: Blood   Result Value Ref Range    Procalcitonin 0.57 (H) 0.00 - 0.25 ng/mL   CBC Auto Differential    Collection Time: 09/28/23  1:41 PM    Specimen: Blood   Result Value Ref Range    WBC 2.64 (L) 3.40 - 10.80 10*3/mm3    RBC 2.25 (L) 3.77 - 5.28 10*6/mm3    Hemoglobin 7.4 (L) 12.0 - 15.9 g/dL    Hematocrit 22.6 (L) 34.0 - 46.6 %    .4 (H) 79.0 - 97.0 fL    MCH 32.9 26.6 - 33.0 pg    MCHC 32.7 31.5 - 35.7 g/dL    RDW 15.7 (H) 12.3 - 15.4 %    RDW-SD 57.6 (H) 37.0 - 54.0 fl    MPV 9.7 6.0 - 12.0 fL    Platelets 110 (L) 140 - 450 10*3/mm3    Neutrophil % 82.3 (H) 42.7 - 76.0 %    Lymphocyte % 9.8 (L) 19.6 - 45.3 %    Monocyte % 5.3 5.0 - 12.0 %    Eosinophil % 1.1 0.3 - 6.2 %    Basophil % 1.1 0.0 - 1.5 %    Immature Grans % 0.4 0.0 - 0.5 %    Neutrophils, Absolute 2.17 1.70 - 7.00 10*3/mm3    Lymphocytes, Absolute 0.26 (L) 0.70 - 3.10 10*3/mm3    Monocytes, Absolute 0.14 0.10 - 0.90 10*3/mm3    Eosinophils, Absolute 0.03 0.00 - 0.40 10*3/mm3    Basophils, Absolute 0.03 0.00 - 0.20 10*3/mm3    Immature Grans, Absolute 0.01 0.00 - 0.05 10*3/mm3    nRBC 0.0 0.0 - 0.2 /100 WBC   Magnesium    Collection Time: 09/28/23  5:11 PM    Specimen: Blood   Result Value Ref Range    Magnesium 1.9 1.6 - 2.4 mg/dL       Ordered the above labs and reviewed the results.        RADIOLOGY  CT Abdomen Pelvis Without Contrast    Result Date: 9/28/2023  Examination: CT of the abdomen and pelvis without contrast  Technique: CT of the abdomen and pelvis without contrast per protocol. Radiation dose reduction techniques were utilized, including automated exposure control and exposure modulation based on body size.   History: Abdominal pain, cancer, and diarrhea   Comparison: PET/CT of 10/6/2022.  Bone windows demonstrate degenerative changes, without suspicious osseous lesion seen.      Large amount of ascites. Incidental findings as above.  Findings: Limited evaluation of the inferior thorax demonstrates atelectasis, without consolidation, pleural effusion, or pneumothorax. The heart is mildly enlarged, without pericardial effusion. Mitral annular calcifications are seen.  There is a large amount of ascites. The kidneys are atrophic. There is a left-sided double-J ureteral stent.  The liver, spleen, adrenal glands, pancreas, stomach, small bowel, large bowel, uterus, and abdominal vasculature are normal as evaluated on this noncontrast examination. No intraperitoneal free gas is seen. No enlarged lymph nodes are demonstrated. Bilateral external iliac chain lymph nodes are smaller, measuring up to 9 mm in short axis diameter.  Bone windows demonstrate degenerative changes, without suspicious osseous lesion seen.  IMPRESSION: 1. Large amount of ascites, new  2. Smaller external iliac chain lymph nodes  3. Incidental findings as above.  This report was finalized on 9/28/2023 3:19 PM by Dr. Ashish Farmer M.D.       Ordered the above noted radiological studies. Reviewed by me in PACS.            PROCEDURES  Critical Care  Performed by: Howard Guzman MD  Authorized by: Howard Guzman MD     Critical care provider statement:     Critical care time (minutes):  37    Critical care time was exclusive of:  Separately billable procedures and treating other patients    Critical care was necessary to treat or prevent imminent or life-threatening deterioration of the following conditions:  Renal failure    Critical care was time spent personally by me on the following activities:  Discussions with consultants, discussions with primary provider, evaluation of patient's response to treatment, examination of patient, obtaining history from patient or surrogate, ordering and performing  treatments and interventions, ordering and review of laboratory studies, ordering and review of radiographic studies, pulse oximetry, re-evaluation of patient's condition and review of old charts    I assumed direction of critical care for this patient from another provider in my specialty: no      Care discussed with: admitting provider            MEDICATIONS GIVEN IN ER  Medications   lactated ringers infusion (125 mL/hr Intravenous New Bag 9/28/23 1742)   lactated ringers bolus 500 mL (0 mL Intravenous Stopped 9/28/23 1430)   ondansetron (ZOFRAN) injection 4 mg (4 mg Intravenous Given 9/28/23 1404)   prochlorperazine (COMPAZINE) injection 10 mg (10 mg Intravenous Given 9/28/23 1751)   diphenhydrAMINE (BENADRYL) injection 25 mg (25 mg Intravenous Given 9/28/23 1743)   potassium chloride 10 mEq in 100 mL IVPB (10 mEq Intravenous New Bag 9/28/23 1755)             MEDICAL DECISION MAKING, PROGRESS, and CONSULTS    All labs have been independently reviewed by me.  All radiology studies have been reviewed by me and I have also reviewed the radiology report.   EKG's independently viewed and interpreted by me.  Discussion below represents my analysis of pertinent findings related to patient's condition, differential diagnosis, treatment plan and final disposition.      Additional sources:  - Discussed/ obtained information from independent historians: Patient's  is here and states that she has had repetitive vomiting despite the Zofran and has not been able to keep down her oral vancomycin oral potassium    - External (non-ED) record review: Yesterday the patient had labs that showed a negative GI panel, negative respiratory panel but positive C. difficile.  I reviewed a note from her oncologist-Dr. Irvin who called her and oral vancomycin since she previously had responded to this for C. difficile in the past    - Chronic or social conditions impacting care: Patient lives with  and does in-home  hemodialysis 5 days a week    - Shared decision making: After shared decision-making discussion to myself, the patient and her  we agree she needs to admitted to the hospital for further evaluation and care      Orders placed during this visit:  Orders Placed This Encounter   Procedures    Blood Culture - Blood,    Blood Culture - Blood,    CT Abdomen Pelvis Without Contrast    Comprehensive Metabolic Panel    Protime-INR    Lipase    Urinalysis With Microscopic If Indicated (No Culture) - Urine, Clean Catch    Lactic Acid, Plasma    Procalcitonin    CBC Auto Differential    Magnesium    Hematology and Oncology (on-call MD unless specified)    Nephrology (on -call MD unless specified)    [facility] (on-call MD unless specified) Details    Family Medicine Consult    Inpatient Admission    CBC & Differential         Differential diagnosis:  My differential diagnosis includes but is not limited to gastritis, pancreatitis, cholecystitis, appendicitis, diverticulitis, urinary tract infection, kidney stone, or bowel obstruction.        Independent interpretation of labs, radiology studies, and discussions with consultants:  ED Course as of 09/28/23 1908   Thu Sep 28, 2023   1256 I will treat the patient with IV fluids and IV antiemetics.  I will order labs including cultures and abdominal CT scan for further evaluation. [GP]   1301 I just spoke with our clinical pharmacist and the patient's C. difficile toxin was positive but her C. difficile antigen is negative.  This could indicate the patient just has colonization and not active disease.  I will check her labs and CT scan and then will consult ID prior to giving the patient Flagyl or vancomycin especially since the patient states her diarrhea has improved. [GP]   8585 The patient is pancytopenic with a white count of 2.6, hemoglobin of 7.4 and platelets of 110.  This is similar to 1 month ago except her hemoglobin is gone from 8.1-7.4. [GP]   1626 Patient's creatinine  is 6.8 and her potassium is 2.8. [GP]   1627 Patient's abdominal CT scan shows a large amount of new ascites but no signs of colitis. [GP]   1640 On repeat examination the patient states that she has vomited 5 times since the Zofran.  Thus I will give her Compazine and Benadryl.  I advised the patient of her ascites.  She states she has never had a paracentesis before.  I also advised her of her hypokalemia.  She states Dr. Ledesma called in potassium pills but she has not been able to keep them down.  We also discussed the issue of her C. difficile is possibly colonization since her CT scan does not show colitis.  I will consult the patient's oncologist, infectious disease and nephrologist.  I advised her we will need to admit her to the hospital.  She understands and agrees with the plan. [GP]   1703 I discussed the case with Dr. Rose from [facility].  She will admit the patient to a telemetry bed.  She is aware of the patient's vomiting and diarrhea with C. difficile studies that could be consistent with colonization.  She is aware of the patient's end-stage renal disease and hypokalemia.  I advised her that I will consult nephrology, hematology and ID. [GP]   1707 I just discussed the case with oncology who states they will consult.  They will likely order paracentesis for tomorrow. [GP]   1735 I discussed the case with Dr. Lozano from infectious disease.  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 difficile infection. [GP]   1735 I am still awaiting nephrology callback but will give the patient 1 potassium run while awaiting her bed. [GP]   1907 I discussed the case with Dr. Rowley from nephrology.  He is aware of the patient's end-stage renal disease and that she performs at home hemodialysis 5 days a week.  We reviewed the patient's chemistries and her  hypokalemia.  He is aware that I have given her 1 dose of IV potassium.  He will consult. [GP]      ED Course User Index  [GP] Howard Guzman MD               DIAGNOSIS  Final diagnoses:   Vomiting and diarrhea   End stage renal disease on dialysis   Hypokalemia   Malignant ascites   Pancytopenia   Primary malignant neoplasm of breast with metastasis   Generalized weakness         DISPOSITION  ADMISSION    Discussed treatment plan and reason for admission with pt/family and admitting physician.  Pt/family voiced understanding of the plan for admission for further testing/treatment as needed.            Latest Documented Vital Signs:  As of 19:08 EDT  BP- 118/47 HR- 75 Temp- 97.5 °F (36.4 °C) O2 sat- 90%--      --------------------  Please note that portions of this were completed with a voice recognition program.       Note Disclaimer: At [facility], we believe that sharing information builds trust and better relationships. You are receiving this note because you are receiving care at [facility] or recently visited. It is possible you will see health information before a provider has talked with you about it. This kind of information can be easy to misunderstand. To help you fully understand what it means for your health, we urge you to discuss this note with your provider.             Howard Guzman MD  09/28/23 1844       Howard Guzman MD  09/28/23 1909      Electronically signed by Howard Guzman MD at 09/28/23 1909       Betty Dutton RN at 09/28/23 1147          Pt to Er via PV for N/V. Pt also has C diff and cancer as well.     Electronically signed by Betty Dutton RN at 09/28/23 1147       Vital Signs (last 2 days)       Date/Time Temp Temp src Pulse Resp BP Patient Position SpO2    09/29/23 0715 98.1 (36.7) Oral 87 16 94/56 Lying 94    09/29/23 0323 97.7 (36.5) Oral 78 16 101/54 Lying 94    09/28/23 2311 98.2 (36.8) Oral 79 16 103/46 Lying 97    09/28/23 2046 98.1 (36.7) Oral 82  16 118/53 Lying 92    09/28/23 1930 -- -- 79 -- -- -- 95    09/28/23 1831 -- -- 75 16 118/47 -- 90    09/28/23 1801 -- -- 78 16 132/50 -- 95    09/28/23 1731 -- -- 79 16 133/59 -- 94    09/28/23 1635 -- -- 81 -- -- -- 96    09/28/23 1332 -- -- 82 -- -- -- 93    09/28/23 1159 -- -- -- -- 115/51 -- --    09/28/23 1148 97.5 (36.4) -- 100 18 -- -- 94          Oxygen Therapy (last 2 days)       Date/Time SpO2 Device (Oxygen Therapy) Flow (L/min) Oxygen Concentration (%) ETCO2 (mmHg)    09/29/23 0715 94 -- -- -- --    09/29/23 0323 94 room air -- -- --    09/29/23 0205 -- CPAP -- -- --    09/28/23 2311 97 room air -- -- --    09/28/23 2046 92 room air -- -- --    09/28/23 1930 95 -- -- -- --    09/28/23 1831 90 -- -- -- --    09/28/23 1801 95 room air -- -- --    09/28/23 1731 94 room air -- -- --    09/28/23 1635 96 -- -- -- --    09/28/23 1332 93 -- -- -- --    09/28/23 1148 94 room air -- -- --          Intake & Output (last 2 days)         09/27 0701 09/28 0700 09/28 0701 09/29 0700 09/29 0701 09/30 0700    IV Piggyback  500     Total Intake(mL/kg)  500 (2.9)     Net  +500                  Lines, Drains & Airways       Active LDAs       Name Placement date Placement time Site Days    Peripheral IV 09/28/23 1354 Right Antecubital 09/28/23  1354  Antecubital  1                  Medication Administration Report for [place] as of 09/29/23 1450     Legend:    Given Hold Not Given Due Canceled Entry Other Actions    Time Time (Time) Time Time-Action         Discontinued     Completed     Future     MAR Hold     Linked             Medications 09/28/23 09/29/23      acetaminophen (TYLENOL) tablet 650 mg  Dose: 650 mg  Freq: Every 4 Hours PRN Route: PO  PRN Reason: Mild Pain  Start: 09/28/23 2153   Admin Instructions:   Do not exceed 4 grams of acetaminophen in a 24 hr period.    If given for pain, use the following pain scale:   Mild Pain = Pain Score of 1-3, CPOT 1-2  Moderate Pain = Pain Score of 4-6, CPOT  3-4  Severe Pain = Pain Score of 7-10, CPOT 5-8  Based on patient request - if ordered for moderate or severe pain, provider allows for administration of a medication prescribed for a lower pain scale.    Do not exceed 4 grams of acetaminophen in a 24 hr period. Max dose of 2gm for AST/ALT greater than 120 units/L.    If given for pain, use the following pain scale:   Mild Pain = Pain Score of 1-3, CPOT 1-2  Moderate Pain = Pain Score of 4-6, CPOT 3-4  Severe Pain = Pain Score of 7-10, CPOT 5-8         sennosides-docusate (PERICOLACE) 8.6-50 MG per tablet 2 tablet  Dose: 2 tablet  Freq: 2 Times Daily Route: PO  Start: 09/28/23 2245   Admin Instructions:   HOLD MEDICATION IF PATIENT HAS HAD BOWEL MOVEMENT. Start bowel management regimen if patient has not had a bowel movement after 12 hours.    (2220)-Not Given [C]            (1000)-Not Given     2100             And  polyethylene glycol (MIRALAX) packet 17 g  Dose: 17 g  Freq: Daily PRN Route: PO  PRN Reason: Constipation  PRN Comment: Use if senna-docusate is ineffective  Start: 09/28/23 2153   Admin Instructions:   Use if no bowel movement after 12 hours. Mix in 6-8 ounces of water.  Use 4-8 ounces of water, tea, or juice for each 17 gram dose.        And  bisacodyl (DULCOLAX) EC tablet 5 mg  Dose: 5 mg  Freq: Daily PRN Route: PO  PRN Reason: Constipation  PRN Comment: Use if polyethylene glycol is ineffective  Start: 09/28/23 2153   Admin Instructions:   Use if no bowel movement after 12 hours.  Swallow whole. Do not crush, split, or chew tablet.        And  bisacodyl (DULCOLAX) suppository 10 mg  Dose: 10 mg  Freq: Daily PRN Route: RE  PRN Reason: Constipation  PRN Comment: Use if bisacodyl oral is ineffective  Start: 09/28/23 2153   Admin Instructions:   Use if no bowel movement after 12 hours.  Hold for diarrhea         dextrose (D50W) (25 g/50 mL) IV injection 25 g  Dose: 25 g  Freq: Every 15 Minutes PRN Route: IV  PRN Reason: Low Blood Sugar  PRN Comment:  Blood Sugar Less Than 70  Start: 09/28/23 2333   Admin Instructions:   Blood sugar less than 70; patient has IV access - Unresponsive, NPO or Unable To Safely Swallow         dextrose (GLUTOSE) oral gel 15 g  Dose: 15 g  Freq: Every 15 Minutes PRN Route: PO  PRN Reason: Low Blood Sugar  PRN Comment: Blood sugar less than 70  Start: 09/28/23 2333   Admin Instructions:   BS<70, Patient Alert, Is not NPO, Can safely swallow.         diphenhydrAMINE (BENADRYL) injection 25 mg  Dose: 25 mg  Freq: Once Route: IV  Start: 09/29/23 1400   Admin Instructions:   25 mg may be given IV push over less than 1 minute.  Caution: Look alike/sound alike drug alert. This med may be ordered in other forms and routes. Before giving verify the last time the drug was given by any route/form.       1400               folic acid (FOLVITE) tablet 1,000 mcg  Dose: 1,000 mcg  Freq: Daily Route: PO  Start: 09/29/23 0900     0957-Given               gabapentin (NEURONTIN) capsule 300 mg  Dose: 300 mg  Freq: Nightly Route: PO  Start: 09/29/23 0030   Admin Instructions:          (0159)-Not Given     2100              glucagon (GLUCAGEN) injection 1 mg  Dose: 1 mg  Freq: Every 15 Minutes PRN Route: IM  PRN Reason: Low Blood Sugar  PRN Comment: Blood Glucose Less Than 70  Start: 09/28/23 2333   Admin Instructions:   Blood Glucose Less Than 70 - Patient Without IV Access - Unresponsive, NPO or Unable To Safely Swallow  Reconstitute powder for injection by adding 1 mL of -supplied sterile diluent or sterile water for injection to a vial containing 1 mg of the drug, to provide solutions containing 1 mg/mL. Shake vial gently to dissolve.         HYDROcodone-acetaminophen (NORCO) 5-325 MG per tablet 1 tablet  Dose: 1 tablet  Freq: Every 4 Hours PRN Route: PO  PRN Reason: Moderate Pain  PRN Comment: Pain  Start: 09/28/23 2333   Admin Instructions:   Based on patient request - if ordered for moderate or severe pain, provider allows for  administration of a medication prescribed for a lower pain scale.  [BEBETO]    Do not exceed 4 grams of acetaminophen in a 24 hr period. Max dose of 2gm for AST/ALT greater than 120 units/L        If given for pain, use the following pain scale:   Mild Pain = Pain Score of 1-3, CPOT 1-2  Moderate Pain = Pain Score of 4-6, CPOT 3-4  Severe Pain = Pain Score of 7-10, CPOT 5-8         insulin lispro (HUMALOG/ADMELOG) injection 2-7 Units  Dose: 2-7 Units  Freq: 4 Times Daily Before Meals & Nightly Route: SC  Start: 09/29/23 0730   Admin Instructions:   Correction Insulin - Low Dose - Total Insulin Dose Less Than 40 units/day (Lean, Elderly or Renal Patients)    Blood Glucose 150-199 mg/dL - 2 units  Blood Glucose 200-249 mg/dL - 3 units  Blood Glucose 250-299 mg/dL - 4 units  Blood Glucose 300-349 mg/dL - 5 units  Blood Glucose 350-400 mg/dL - 6 units  Blood Glucose Greater Than 400 mg/dL - 7 units & Call Provider     Caution: Look alike/sound alike drug alert       (0958)-Not Given     1238-Given     1730     2100            levothyroxine (SYNTHROID, LEVOTHROID) tablet 50 mcg  Dose: 50 mcg  Freq: Every Early Morning Route: PO  Start: 09/29/23 0600   Admin Instructions:   Take on empty stomach.     0618-Given               melatonin tablet 3 mg  Dose: 3 mg  Freq: Nightly PRN Route: PO  PRN Reason: Sleep  Start: 09/28/23 2153         Menthol-Zinc Oxide 1 application   Dose: 1 application   Freq: 4 Times Daily Route: TOP  Start: 09/29/23 1200   Admin Instructions:   Apply to perineal/ perianal skin and posterior/ medial left thigh. Avoid placing brief over this area.      1200     1800     2100             ondansetron (ZOFRAN) tablet 4 mg  Dose: 4 mg  Freq: Every 6 Hours PRN Route: PO  PRN Reasons: Nausea,Vomiting  Start: 09/28/23 2153        Or  ondansetron (ZOFRAN) injection 4 mg  Dose: 4 mg  Freq: Every 6 Hours PRN Route: IV  PRN Reasons: Nausea,Vomiting  Start: 09/28/23 2153         prochlorperazine (COMPAZINE) injection  "10 mg  Dose: 10 mg  Freq: Every 6 Hours PRN Route: IV  PRN Reasons: Nausea,Vomiting  Start: 09/28/23 2331         sertraline (ZOLOFT) tablet 150 mg  Dose: 150 mg  Freq: Daily Route: PO  Start: 09/29/23 0900 0957-Given               vitamin B-12 (CYANOCOBALAMIN) tablet 1,000 mcg  Dose: 1,000 mcg  Freq: Daily Route: PO  Start: 09/29/23 0900 0957-Given              Completed Medications  Medications 09/28/23 09/29/23       diphenhydrAMINE (BENADRYL) injection 25 mg  Dose: 25 mg  Freq: Once Route: IV  Start: 09/28/23 1658   End: 09/28/23 1743   Admin Instructions:   25 mg may be given IV push over less than 1 minute.  Caution: Look alike/sound alike drug alert. This med may be ordered in other forms and routes. Before giving verify the last time the drug was given by any route/form.      1743-Given                lactated ringers bolus 500 mL  Dose: 500 mL  Freq: Once Route: IV  Start: 09/28/23 1312   End: 09/28/23 1430    1355-New Bag     1430-Stopped               ondansetron (ZOFRAN) injection 4 mg  Dose: 4 mg  Freq: Once Route: IV  Start: 09/28/23 1312   End: 09/28/23 1404   Admin Instructions:   \"If multiple N/V medications ordered, use in the following order: Ondansetron, Prochlorperazine, Promethazine. Use PO unless patient refuses or patient unable to swallow.\"      1404-Given                potassium chloride (K-DUR,KLOR-CON) ER tablet 40 mEq  Dose: 40 mEq  Freq: Once Route: PO  Start: 09/29/23 1000   End: 09/29/23 0957   Admin Instructions:   Do not crush. Take with food.  Swallow whole; do not crush, split, or chew.     0957-Given               potassium chloride 10 mEq in 100 mL IVPB  Dose: 10 mEq  Freq: Once Route: IV  Start: 09/28/23 1752   End: 09/28/23 1855   Admin Instructions:   OUTPATIENT/NON-MONITORED UNITS: Potassium Chloride standard bolus infusion rate is a maximum of 10 mEq/hr on unmonitored patients    MONITORED UNITS: Potassium Chloride standard bolus infusion rate is a maximum of 20 " mEq/hr on ECG monitored patients ONLY      1755-New Bag                prochlorperazine (COMPAZINE) injection 10 mg  Dose: 10 mg  Freq: Once Route: IV  Start: 09/28/23 1658   End: 09/28/23 1751 1751-Given               Discontinued Medications  Medications 09/28/23 09/29/23       lactated ringers infusion  Rate: 50 mL/hr Dose: 50 mL/hr  Freq: Continuous Route: IV  Start: 09/28/23 1312   End: 09/29/23 0901    1355-New Bag     1613-Currently Infusing     1742-New Bag     2345-Rate/Dose Change         0159-Stopped               metroNIDAZOLE (FLAGYL) IVPB 500 mg  Dose: 500 mg  Freq: Once Route: IV  Indications of Use: CLOSTRIDIOIDES DIFFICILE INFECTION  Start: 09/28/23 1312   End: 09/28/23 1301   Admin Instructions:   Caution: Look alike/sound alike drug alert.  Do not refrigerate.         sodium chloride 0.9 % infusion  Rate: 75 mL/hr Dose: 75 mL/hr  Freq: Continuous Route: IV  Start: 09/29/23 0030   End: 09/29/23 0901     0159-New Bag                Lab Results (all)       Procedure Component Value Units Date/Time    Blood Culture - Blood, Arm, Right [[id number]]  (Normal) Collected: 09/28/23 1341    Specimen: Blood from Arm, Right Updated: 09/29/23 1415     Blood Culture No growth at 24 hours    Hepatitis B Surface Antigen [[id number]]  (Normal) Collected: 09/29/23 1210    Specimen: Blood Updated: 09/29/23 1346     Hepatitis B Surface Ag Non-Reactive    POC Glucose Once [[id number]]  (Abnormal) Collected: 09/29/23 1113    Specimen: Blood Updated: 09/29/23 1114     Glucose 151 mg/dL     Basic Metabolic Panel [[id number]]  (Abnormal) Collected: 09/29/23 0645    Specimen: Blood Updated: 09/29/23 0733     Glucose 148 mg/dL      BUN 30 mg/dL      Creatinine 7.18 mg/dL      Sodium 135 mmol/L      Potassium 2.7 mmol/L      Chloride 97 mmol/L      CO2 25.0 mmol/L      Calcium 8.2 mg/dL      BUN/Creatinine Ratio 4.2     Anion Gap 13.0 mmol/L      eGFR 5.9 mL/min/1.73      Comment: <15 Indicative of kidney failure        "Narrative:      GFR Normal >60  Chronic Kidney Disease <60  Kidney Failure <15      CBC (No Diff) [[id number]]  (Abnormal) Collected: 09/29/23 0645    Specimen: Blood Updated: 09/29/23 0721     WBC 2.68 10*3/mm3      RBC 2.10 10*6/mm3      Hemoglobin 6.8 g/dL      Hematocrit 20.4 %      MCV 97.1 fL      MCH 32.4 pg      MCHC 33.3 g/dL      RDW 15.6 %      RDW-SD 55.3 fl      MPV 9.8 fL      Platelets 101 10*3/mm3     POC Glucose Once [[id number]]  (Abnormal) Collected: 09/29/23 0612    Specimen: Blood Updated: 09/29/23 0613     Glucose 160 mg/dL     Blood Culture - Blood, Arm, Right [[id number]] Collected: 09/28/23 1711    Specimen: Blood from Arm, Right Updated: 09/29/23 0443    Magnesium [[id number]]  (Normal) Collected: 09/28/23 1711    Specimen: Blood Updated: 09/28/23 1850     Magnesium 1.9 mg/dL     Procalcitonin [[id number]]  (Abnormal) Collected: 09/28/23 1341    Specimen: Blood Updated: 09/28/23 1436     Procalcitonin 0.57 ng/mL     Narrative:      As a Marker for Sepsis (Non-Neonates):    1. <0.5 ng/mL represents a low risk of severe sepsis and/or septic shock.  2. >2 ng/mL represents a high risk of severe sepsis and/or septic shock.    As a Marker for Lower Respiratory Tract Infections that require antibiotic therapy:    PCT on Admission    Antibiotic Therapy       6-12 Hrs later    >0.5                Strongly Recommended  >0.25 - <0.5        Recommended   0.1 - 0.25          Discouraged              Remeasure/reassess PCT  <0.1                Strongly Discouraged     Remeasure/reassess PCT    As 28 day mortality risk marker: \"Change in Procalcitonin Result\" (>80% or <=80%) if Day 0 (or Day 1) and Day 4 values are available. Refer to http://www.4momss-pct-calculator.com    Change in PCT <=80%  A decrease of PCT levels below or equal to 80% defines a positive change in PCT test result representing a higher risk for 28-day all-cause mortality of patients diagnosed with severe sepsis for septic shock.    Change " in PCT >80%  A decrease of PCT levels of more than 80% defines a negative change in PCT result representing a lower risk for 28-day all-cause mortality of patients diagnosed with severe sepsis or septic shock.       Lipase [[id number]]  (Normal) Collected: 09/28/23 1341    Specimen: Blood Updated: 09/28/23 1430     Lipase 22 U/L     Comprehensive Metabolic Panel [[id number]]  (Abnormal) Collected: 09/28/23 1341    Specimen: Blood Updated: 09/28/23 1430     Glucose 178 mg/dL      BUN 26 mg/dL      Creatinine 6.85 mg/dL      Sodium 137 mmol/L      Potassium 2.8 mmol/L      Chloride 95 mmol/L      CO2 27.0 mmol/L      Calcium 8.7 mg/dL      Total Protein 6.9 g/dL      Albumin 2.7 g/dL      ALT (SGPT) 11 U/L      AST (SGOT) 12 U/L      Alkaline Phosphatase 104 U/L      Total Bilirubin 0.6 mg/dL      Globulin 4.2 gm/dL      A/G Ratio 0.6 g/dL      BUN/Creatinine Ratio 3.8     Anion Gap 15.0 mmol/L      eGFR 6.3 mL/min/1.73      Comment: <15 Indicative of kidney failure       Narrative:      GFR Normal >60  Chronic Kidney Disease <60  Kidney Failure <15      Lactic Acid, Plasma [[id number]]  (Normal) Collected: 09/28/23 1341    Specimen: Blood Updated: 09/28/23 1429     Lactate 1.2 mmol/L     Protime-INR [[id number]]  (Abnormal) Collected: 09/28/23 1341    Specimen: Blood Updated: 09/28/23 1421     Protime 16.3 Seconds      INR 1.29    CBC & Differential [[id number]]  (Abnormal) Collected: 09/28/23 1341    Specimen: Blood Updated: 09/28/23 1411    Narrative:      The following orders were created for panel order CBC & Differential.  Procedure                               Abnormality         Status                     ---------                               -----------         ------                     CBC Auto Differential[[id number]]        Abnormal            Final result                 Please view results for these tests on the individual orders.    CBC Auto Differential [[id number]]  (Abnormal) Collected: 09/28/23 1341     Specimen: Blood Updated: 09/28/23 1411     WBC 2.64 10*3/mm3      RBC 2.25 10*6/mm3      Hemoglobin 7.4 g/dL      Hematocrit 22.6 %      .4 fL      MCH 32.9 pg      MCHC 32.7 g/dL      RDW 15.7 %      RDW-SD 57.6 fl      MPV 9.7 fL      Platelets 110 10*3/mm3      Neutrophil % 82.3 %      Lymphocyte % 9.8 %      Monocyte % 5.3 %      Eosinophil % 1.1 %      Basophil % 1.1 %      Immature Grans % 0.4 %      Neutrophils, Absolute 2.17 10*3/mm3      Lymphocytes, Absolute 0.26 10*3/mm3      Monocytes, Absolute 0.14 10*3/mm3      Eosinophils, Absolute 0.03 10*3/mm3      Basophils, Absolute 0.03 10*3/mm3      Immature Grans, Absolute 0.01 10*3/mm3      nRBC 0.0 /100 WBC           Imaging Results (All)       Procedure Component Value Units Date/Time    CT Abdomen Pelvis Without Contrast [[id number]] Collected: 09/28/23 1514     Updated: 09/28/23 1522    Narrative:      Examination: CT of the abdomen and pelvis without contrast     Technique: CT of the abdomen and pelvis without contrast per protocol.  Radiation dose reduction techniques were utilized, including automated  exposure control and exposure modulation based on body size.        History: Abdominal pain, cancer, and diarrhea     Comparison: PET/CT of 10/6/2022.     Bone windows demonstrate degenerative changes, without suspicious  osseous lesion seen.       Impression:      Large amount of ascites. Incidental findings as above.     Findings: Limited evaluation of the inferior thorax demonstrates  atelectasis, without consolidation, pleural effusion, or pneumothorax.  The heart is mildly enlarged, without pericardial effusion. Mitral  annular calcifications are seen.     There is a large amount of ascites. The kidneys are atrophic. There is a  left-sided double-J ureteral stent.     The liver, spleen, adrenal glands, pancreas, stomach, small bowel, large  bowel, uterus, and abdominal vasculature are normal as evaluated on this  noncontrast examination. No  intraperitoneal free gas is seen. No  enlarged lymph nodes are demonstrated. Bilateral external iliac chain  lymph nodes are smaller, measuring up to 9 mm in short axis diameter.     Bone windows demonstrate degenerative changes, without suspicious  osseous lesion seen.     IMPRESSION:  1. Large amount of ascites, new     2. Smaller external iliac chain lymph nodes     3. Incidental findings as above.     This report was finalized on 9/28/2023 3:19 PM by Dr. Ashish Farmer M.D.             ECG/EMG Results (all)       Procedure Component Value Units Date/Time    SCANNED - TELEMETRY   [[id number]] Resulted: 09/28/23     Updated: 09/28/23 2337    ECG 12 Lead Rhythm Change [[id number]] Collected: 09/29/23 0333     Updated: 09/29/23 0341     QT Interval 426 ms      QTC Interval 504 ms     Narrative:      HEART RATE= 84  bpm  RR Interval= 714  ms  VA Interval=   ms  P Horizontal Axis=   deg  P Front Axis=   deg  QRSD Interval= 107  ms  QT Interval= 426  ms  QTcB= 504  ms  QRS Axis= 44  deg  T Wave Axis= 231  deg  - ABNORMAL ECG -  Atrial fibrillation  Borderline repolarization abnormality  Prolonged QT interval  Electronically Signed By:   Date and Time of Study: 2023-09-29 03:33:18    SCANNED - TELEMETRY   [[id number]] Resulted: 09/28/23     Updated: 09/29/23 0842          Orders (last 48 hrs)        Start     Ordered    09/29/23 1414  Protein, Body Fluid - Body Fluid, Peritoneum  Once        Comments: Ascitic fluid after from paracentesis      09/29/23 1414    09/29/23 1400  diphenhydrAMINE (BENADRYL) injection 25 mg  Once         09/29/23 1305    09/29/23 1200  Menthol-Zinc Oxide 1 application   4 Times Daily         09/29/23 1044    09/29/23 1148  Hepatitis B Surface Antigen  STAT         09/29/23 1147    09/29/23 1115  POC Glucose Once  PROCEDURE ONCE        Comments: Complete no more than 45 minutes prior to patient eating      09/29/23 1113    09/29/23 1044  Follow Pressure Ulcer Prevention Measures Policy  Continuous         Comments: Implement Appropriate Pressure Ulcer Prevention Measures  - Open Order Report to View Full Instructions  Enter Wound LDA & Document Assessment  Add Wound Care Plan  Add Patient Education Per Policy    09/29/23 1044    09/29/23 1044  Turn Patient  Now Then Every 2 Hours         09/29/23 1044    09/29/23 1044  Head of Bed 30 Degrees or Less (Unless Contraindicated)  Until Discontinued         09/29/23 1044    09/29/23 1044  Elevate Heels Off of Bed  Until Discontinued         09/29/23 1044    09/29/23 1044  Use Seat Cushion When Up In Chair  Continuous         09/29/23 1044    09/29/23 1044  Silicone Border Dressing to Bony Prominences  Every Shift       09/29/23 1044    09/29/23 1044  Do NOT Rub or Massage Any Bony Prominence  Continuous         09/29/23 1044    09/29/23 1000  potassium chloride (K-DUR,KLOR-CON) ER tablet 40 mEq  Once         09/29/23 0901    09/29/23 0950  US Paracentesis  1 Time Imaging         09/29/23 0950    09/29/23 0925  PT Consult: Eval & Treat Functional Mobility Below Baseline  Once         09/29/23 0925    09/29/23 0901  Verify Informed Consent for Blood Product Administration  Once         09/29/23 0901    09/29/23 0901  Prepare RBC, 1 Units  Blood - Once         09/29/23 0901    09/29/23 0901  Type & Screen  Once         09/29/23 0901 09/29/23 0900  folic acid (FOLVITE) tablet 1,000 mcg  Daily         09/28/23 2334 09/29/23 0900  sertraline (ZOLOFT) tablet 150 mg  Daily         09/28/23 2334 09/29/23 0900  vitamin B-12 (CYANOCOBALAMIN) tablet 1,000 mcg  Daily         09/28/23 2334 09/29/23 0758  Hemodialysis Inpatient  Once        Comments: Tight heparin    09/29/23 0757    09/29/23 0730  insulin lispro (HUMALOG/ADMELOG) injection 2-7 Units  4 Times Daily Before Meals & Nightly         09/28/23 2334 09/29/23 0700  POC Glucose 4x Daily Before Meals & at Bedtime  4 Times Daily Before Meals & at Bedtime      Comments: Complete no more than 45 minutes prior to  patient eating      09/28/23 2334    09/29/23 0614  POC Glucose Once  PROCEDURE ONCE        Comments: Complete no more than 45 minutes prior to patient eating      09/29/23 0612    09/29/23 0600  Basic Metabolic Panel  Morning Draw         09/28/23 2153 09/29/23 0600  CBC (No Diff)  Morning Draw         09/28/23 2153 09/29/23 0600  levothyroxine (SYNTHROID, LEVOTHROID) tablet 50 mcg  Every Early Morning         09/28/23 2334    09/29/23 0536  Wound Ostomy Eval & Treat  Once         09/29/23 0535    09/29/23 0507  Inpatient Case Management  Consult  Once        Provider:  (Not yet assigned)    09/29/23 0507    09/29/23 0456  Inpatient Cardiology Consult  Once        Specialty:  Cardiology  Provider:  Luis Rivers MD    09/29/23 0456    09/29/23 0313  ECG 12 Lead Rhythm Change  Once         09/29/23 0313    09/29/23 0030  sodium chloride 0.9 % infusion  Continuous,   Status:  Discontinued         09/28/23 2332 09/29/23 0030  gabapentin (NEURONTIN) capsule 300 mg  Nightly         09/28/23 2334    09/29/23 0000  Vital Signs  Every 4 Hours      Comments: Per per hospital policy    09/28/23 2153 09/28/23 2333  dextrose (GLUTOSE) oral gel 15 g  Every 15 Minutes PRN         09/28/23 2334    09/28/23 2333  dextrose (D50W) (25 g/50 mL) IV injection 25 g  Every 15 Minutes PRN         09/28/23 2334    09/28/23 2333  glucagon (GLUCAGEN) injection 1 mg  Every 15 Minutes PRN         09/28/23 2334    09/28/23 2333  HYDROcodone-acetaminophen (NORCO) 5-325 MG per tablet 1 tablet  Every 4 Hours PRN         09/28/23 2334    09/28/23 2332  Inpatient Infectious Diseases Consult  Once        Specialty:  Infectious Diseases  Provider:  Lety Nieves MD    09/28/23 2331    09/28/23 2331  prochlorperazine (COMPAZINE) injection 10 mg  Every 6 Hours PRN         09/28/23 2332    09/28/23 2331  Inpatient Hematology & Oncology Consult  Once        Specialty:  Hematology and Oncology  Provider:  Gibson Roque  MD GONZALO    09/28/23 2331 09/28/23 2245  sennosides-docusate (PERICOLACE) 8.6-50 MG per tablet 2 tablet  2 Times Daily        See Hyperspace for full Linked Orders Report.    09/28/23 2153 09/28/23 2154  Inpatient Admission  Once         09/28/23 2153 09/28/23 2154  Code Status and Medical Interventions:  Continuous         09/28/23 2153 09/28/23 2154  Diet: Cardiac Diets; Healthy Heart (2-3 Na+); Texture: Regular Texture (IDDSI 7); Fluid Consistency: Thin (IDDSI 0)  Diet Effective Now         09/28/23 2153 09/28/23 2154  Daily Weights  Daily       09/28/23 2153 09/28/23 2154  Strict Intake & Output  Every Shift       09/28/23 2153 09/28/23 2154  Place Sequential Compression Device  Once         09/28/23 2153 09/28/23 2154  Maintain Sequential Compression Device  Continuous         09/28/23 2153 09/28/23 2154  Cardiac Monitoring  Continuous        Comments: Follow Standing Orders As Outlined in Process Instructions (Open Order Report to View Full Instructions)    09/28/23 2153 09/28/23 2153  polyethylene glycol (MIRALAX) packet 17 g  Daily PRN        See Hyperspace for full Linked Orders Report.    09/28/23 2153 09/28/23 2153  bisacodyl (DULCOLAX) EC tablet 5 mg  Daily PRN        See Hyperspace for full Linked Orders Report.    09/28/23 2153 09/28/23 2153  bisacodyl (DULCOLAX) suppository 10 mg  Daily PRN        See Hyperspace for full Linked Orders Report.    09/28/23 2153 09/28/23 2153  ondansetron (ZOFRAN) tablet 4 mg  Every 6 Hours PRN        See Hyperspace for full Linked Orders Report.    09/28/23 2153 09/28/23 2153  ondansetron (ZOFRAN) injection 4 mg  Every 6 Hours PRN        See Hyperspace for full Linked Orders Report.    09/28/23 2153 09/28/23 2153  acetaminophen (TYLENOL) tablet 650 mg  Every 4 Hours PRN         09/28/23 2153 09/28/23 2153  melatonin tablet 3 mg  Nightly PRN         09/28/23 2153    09/28/23 1910  Critical Care  Once        Comments: This  order was created via procedure documentation    09/28/23 1909    09/28/23 1752  potassium chloride 10 mEq in 100 mL IVPB  Once         09/28/23 1736    09/28/23 1737  Inpatient Admission  Once         09/28/23 1736    09/28/23 1709  Family Medicine Consult  Once        Specialty:  Family Medicine  Provider:  Juan Alberto Lozano MD    09/28/23 1710    09/28/23 1658  prochlorperazine (COMPAZINE) injection 10 mg  Once         09/28/23 1642    09/28/23 1658  diphenhydrAMINE (BENADRYL) injection 25 mg  Once         09/28/23 1642    09/28/23 1657  [facility] (on-call MD unless specified) Details  Once        Specialty:  Hospitalist  Provider:  (Not yet assigned)    09/28/23 1656    09/28/23 1643  Nephrology (on -call MD unless specified)  Once        Specialty:  Nephrology  Provider:  Tai Antonio MD    09/28/23 1642    09/28/23 1643  Magnesium  Once         09/28/23 1642    09/28/23 1642  Hematology and Oncology (on-call MD unless specified)  Once        Specialty:  Hematology and Oncology  Provider:  (Not yet assigned)    09/28/23 1642    09/28/23 1312  lactated ringers bolus 500 mL  Once         09/28/23 1256    09/28/23 1312  lactated ringers infusion  Continuous,   Status:  Discontinued         09/28/23 1256    09/28/23 1312  ondansetron (ZOFRAN) injection 4 mg  Once         09/28/23 1256    09/28/23 1312  metroNIDAZOLE (FLAGYL) IVPB 500 mg  Once,   Status:  Discontinued         09/28/23 1256    09/28/23 1256  CBC & Differential  Once         09/28/23 1256    09/28/23 1256  Comprehensive Metabolic Panel  Once         09/28/23 1256    09/28/23 1256  Protime-INR  Once         09/28/23 1256    09/28/23 1256  Lipase  Once         09/28/23 1256    09/28/23 1256  Urinalysis With Microscopic If Indicated (No Culture) - Urine, Clean Catch  Once         09/28/23 1256    09/28/23 1256  Lactic Acid, Plasma  Once         09/28/23 1256    09/28/23 1256  Procalcitonin  Once         09/28/23 1256    09/28/23 1256  Blood  Culture - Blood, Arm, Right  Once         09/28/23 1256    09/28/23 1256  Blood Culture - Blood, Arm, Right  Once         09/28/23 1256    09/28/23 1256  CT Abdomen Pelvis Without Contrast  1 Time Imaging        Comments: NON-CONTRASTED STUDY      09/28/23 1256    09/28/23 1256  CBC Auto Differential  PROCEDURE ONCE         09/28/23 1256    Unscheduled  Up with assistance  As Needed       09/28/23 2153    Unscheduled  Follow Hypoglycemia Standing Orders For Blood Glucose <70 & Notify Provider of Treatment  As Needed      Comments: Follow Hypoglycemia Orders As Outlined in Process Instructions (Open Order Report to View Full Instructions)  Notify Provider Any Time Hypoglycemia Treatment is Administered    09/28/23 2334    Unscheduled  Transfuse RBC, 1 Units Infuse Each Unit Over: 3.5H  Transfusion         09/29/23 0901    Unscheduled  Apply Moisture Barrier After Any Incontinence  As Needed      Comments: Calmoseptine ointment 4x day to left medial/ posterior thigh. Avoid having brief rub on this area.    09/29/23 1044    --  SCANNED - TELEMETRY           09/28/23 0000    --  SCANNED - TELEMETRY           09/28/23 0000    Signed and Held  Obtain Informed Consent  Once         Signed and Held    Signed and Held  Body Fluid Cell Count With Differential - Body Fluid, Peritoneum  STAT         Signed and Held    Signed and Held  Albumin, Fluid - Body Fluid, Peritoneum  STAT         Signed and Held    Signed and Held  Non-gynecologic Cytology  Once         Signed and Held    Signed and Held  Flow Cytometry  Once         Signed and Held    Signed and Held  Body Fluid Culture - Body Fluid, Peritoneum  STAT         Signed and Held                       Consult Notes (all)        Tai Antonio MD at 09/29/23 0905        Consult Orders    1. Nephrology (on -call MD unless specified) [[id number]] ordered by Howard Guzman MD at 09/28/23 2539                                                                                                                  [facility]                                                                                             Nephrology Initial Consult Note    Patient Identification:  Name: Juana Hall MRN: [id number]  Age: 62 y.o. : 1960  Sex: female  Date:2023    Requesting Physician: As per consult order.  Reason for Consultation: ESRD, hypokalemia management  Information from:patient/ family/ chart      History of Present Illness: This is a 62 y.o. year old female who is well-known to me from outpatient dialysis.  She has a history of metastatic breast cancer, CHF, aortic stenosis status post valvuloplasty and is on home hemodialysis 5 days/week.  Her last dialysis was on Wednesday.  Gotten several calls from her dialysis unit and also from oncology about her low potassium associated with refractory diarrhea, nausea and vomiting.  Initially this was felt to be due to recurrent C. difficile colitis.  She was admitted last night due to recurrent symptoms was found to be hypokalemic but she feels much better today denies any nausea or vomiting and she states her diarrhea is better.  She has had no fevers or chills and agrees to dialysis today.  Her hemoglobin is 6.8 and her potassium is 2.7.  Denies any shortness of breath or chest pain.  She has an AV fistula in place for access with no recent issues.    The following medical history and medications personally reviewed by me:    Problem List:     Intractable vomiting      Past Medical History:  Past Medical History:   Diagnosis Date    Anemia     Anxiety and depression     Bicuspid aortic valve     had surgery    Breast cancer     RIGHT, HAD NEELA. 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    History of sepsis 2010    KIDNEY STONE    History of transfusion     no reaction    Hyperlipidemia     Hyperthyroidism     Hypothyroidism     Kidney stone     CURRENT LEFT-DEC 2021    Lymphedema of leg     LEFT    Metastasis from breast cancer 2019    STOMACH-OMENTUM    Neuromuscular disorder     Obesity     ANDREW on CPAP     CPAP    Osteoarthrosis     Peripheral neuropathy     FEET BILAT    Radiculopathy     Rectal bleeding 08/16/2022    Thickened endometrium     Urinary incontinence     wears pads    Weakness     BILAT LEGS-WITH ANY AMT OF TIME       Past Surgical History:  Past Surgical History:   Procedure Laterality Date    AORTIC VALVULOPLASTY  01/2023    ARTERIOVENOUS FISTULA/SHUNT SURGERY Left 11/03/2021    Procedure: LEFT  BRACHIOCEPALIC ARTERIOVENOUS FISTULA;  Surgeon: Dejuan Adler MD;  Location: [facility] MAIN OR;  Service: Vascular;  Laterality: Left;    BREAST RECONSTRUCTION  2004    WITH IMPLANTS, AND REVISION, NOW REMOVED    BREAST SURGERY Bilateral 2004    breast implants removed and then replaced due to infection    CARDIAC CATHETERIZATION N/A 08/23/2022    Procedure: CORONARY ANGIOGRAPHY;  Surgeon: Luis Rivers MD;  [facility];  Service: Cardiology;  Laterality: N/A;    CARDIAC CATHETERIZATION N/A 08/23/2022    Procedure: Right Heart Cath;  Surgeon: Luis Rivers MD;  [facility];  Service: Cardiology;  Laterality: N/A;    CARDIAC CATHETERIZATION N/A 01/10/2023    Procedure: Valvuloplasty;  Surgeon: Luis Rivers MD;  Location: [facility] CATH INVASIVE LOCATION;  Service: Cardiovascular;  Laterality: N/A;  01/10/2023     CARDIAC CATHETERIZATION N/A 01/10/2023    Procedure: Aortic root aortogram;  Surgeon: Luis Rivers MD;  [facility];  Service: Cardiovascular;  Laterality: N/A;    CATARACT EXTRACTION WITH INTRAOCULAR LENS IMPLANT Bilateral      SECTION  1987     SECTION  1987    CYSTOSCOPY W/ URETERAL STENT PLACEMENT  2010    CYSTOSCOPY W/ URETERAL STENT PLACEMENT Left 2021    Procedure: CYSTOSCOPY KEFT RETROGRADE PYELOGRAM  LEFT  URETERAL STENT PLACEMENT;  Surgeon: Leodan Mckee Jr., MD;  Location: [facility] MAIN OR;  Service: Urology;  Laterality: Left;    CYSTOSCOPY W/ URETERAL STENT PLACEMENT Left 03/10/2022    Procedure: CYSTOSCOPY AND LEFT URETERAL STENT EXCHANGE, RETROGRADE PYELOGRAM;  Surgeon: Leodan Mckee Jr., MD;  Location: [facility] MAIN OR;  Service: Urology;  Laterality: Left;    CYSTOSCOPY W/ URETERAL STENT PLACEMENT Left 2023    Procedure: CYSTOSCOPY WITH LEFT URETERAL STENT PLACEMENT, RETROGRADE PYELOGRAM, CLOT EVACUATION, BLADDER FULGARATION;  Surgeon: Leodan Mckee Jr., MD;  Location: [facility] MAIN OR;  Service: Urology;  Laterality: Left;    D & C HYSTEROSCOPY N/A 2017    Procedure: DILATATION AND CURETTAGE, HYSTEROSCOPY ;  Surgeon: Cherie Oliveros MD;  Location: [facility] OR [facility];  Service:     D & C HYSTEROSCOPY N/A 2019    Procedure: DILATATION AND CURETTAGE HYSTEROSCOPY/POLYPECTOMY;  Surgeon: Cherie Oliveros MD;  [facility];  Service: Gynecology    D & C HYSTEROSCOPY ENDOMETRIAL ABLATION N/A 2023    Procedure: DILATATION AND CURETTAGE;  Surgeon: Ina Marcos MD;  Location: [facility] MAIN OR;  Service: Obstetrics/Gynecology;  Laterality: N/A;    ENDOSCOPY      INSERTION AND REMOVAL HEMODIALYSIS CATHETER Right 2021    INSERTION HEMODIALYSIS CATHETER Right 2021    Procedure: HEMODIALYSIS CATHETER INSERTION;  Surgeon: Clint Hernández MD;  Location: [facility] MAIN OR;  Service:  Vascular;  Laterality: Right;    LYMPH NODE BIOPSY      MASTECTOMY Bilateral 2004    VENOUS ACCESS DEVICE (PORT) INSERTION AND REMOVAL          Home Meds:   Medications Prior to Admission   Medication Sig Dispense Refill Last Dose    Abemaciclib (Verzenio) 100 MG tablet Take 1 tablet by mouth 2 (Two) Times a Day. 56 tablet 5 Past Month    atorvastatin (LIPITOR) 40 MG tablet TAKE ONE TABLET BY MOUTH ONCE NIGHTLY 30 tablet 0 9/27/2023    diphenoxylate-atropine (LOMOTIL) 2.5-0.025 MG per tablet Take 1 tablet by mouth 4 (Four) Times a Day As Needed for Diarrhea. 30 tablet 0 9/27/2023    insulin detemir (Levemir FlexPen) 100 UNIT/ML injection Inject 50 Units under the skin into the appropriate area as directed Daily. 11 mL 0 Past Week    vancomycin (Vancocin) 125 MG capsule Take 1 capsule by mouth 4 (Four) Times a Day for 14 days. 56 capsule 0 9/27/2023    acetaminophen (TYLENOL) 500 MG tablet Take 2 tablets by mouth As Needed for Mild Pain.       folic acid (FOLVITE) 1 MG tablet TAKE 1 TABLET BY MOUTH DAILY 30 tablet 2     Fulvestrant (FASLODEX) 250 MG/5ML chemo syringe Inject  into the appropriate muscle as directed by prescriber Every 30 (Thirty) Days. In MD office       gabapentin (Neurontin) 300 MG capsule Take 1 capsule by mouth Every Night. 60 capsule 2     HYDROcodone-acetaminophen (NORCO) 5-325 MG per tablet Take 1-2 tablets by mouth Every 4 (Four) Hours As Needed for Moderate Pain (Pain). 12 tablet 0     levothyroxine (SYNTHROID, LEVOTHROID) 50 MCG tablet Take 1 tablet by mouth Daily. (Patient taking differently: Take 1 tablet by mouth Every Morning.) 90 tablet 1     mupirocin (BACTROBAN) 2 % ointment Apply 1 application  topically to the appropriate area as directed Daily. Applied to fistula insertion sites for dialysis       ondansetron (ZOFRAN) 8 MG tablet TAKE ONE TABLET BY MOUTH EVERY 8 HOURS AS NEEDED FOR NAUSEA OR VOMITING 18 tablet 2     sertraline (ZOLOFT) 100 MG tablet TAKE 1 AND 1/2 TABLET BY MOUTH  DAILY 135 tablet 0     vitamin B-12 (CYANOCOBALAMIN) 1000 MCG tablet Take 1 tablet by mouth Daily.          Current Meds:   Current Facility-Administered Medications   Medication Dose Route Frequency Provider Last Rate Last Admin    acetaminophen (TYLENOL) tablet 650 mg  650 mg Oral Q4H PRN Mary Rose MD        sennosides-docusate (PERICOLACE) 8.6-50 MG per tablet 2 tablet  2 tablet Oral BID Mary Rose MD        And    polyethylene glycol (MIRALAX) packet 17 g  17 g Oral Daily PRN Mary Rose MD        And    bisacodyl (DULCOLAX) EC tablet 5 mg  5 mg Oral Daily PRN Mary Rose MD        And    bisacodyl (DULCOLAX) suppository 10 mg  10 mg Rectal Daily PRN Mary Rose MD        dextrose (D50W) (25 g/50 mL) IV injection 25 g  25 g Intravenous Q15 Min PRN Mayr Rose MD        dextrose (GLUTOSE) oral gel 15 g  15 g Oral Q15 Min PRN Mary Rose MD        folic acid (FOLVITE) tablet 1,000 mcg  1,000 mcg Oral Daily Mary Rose MD        gabapentin (NEURONTIN) capsule 300 mg  300 mg Oral Nightly Mary Rose MD        glucagon (GLUCAGEN) injection 1 mg  1 mg Intramuscular Q15 Min PRN Mary Rose MD        HYDROcodone-acetaminophen (NORCO) 5-325 MG per tablet 1 tablet  1 tablet Oral Q4H PRN Mary Rose MD        insulin lispro (HUMALOG/ADMELOG) injection 2-7 Units  2-7 Units Subcutaneous 4x Daily AC & at Bedtime Mary Rose MD        levothyroxine (SYNTHROID, LEVOTHROID) tablet 50 mcg  50 mcg Oral Q AM Mary Rose MD   50 mcg at 09/29/23 0618    melatonin tablet 3 mg  3 mg Oral Nightly PRN Mary Rose MD        ondansetron (ZOFRAN) tablet 4 mg  4 mg Oral Q6H PRN Mary Rose MD        Or    ondansetron (ZOFRAN) injection 4 mg  4 mg Intravenous Q6H PRN Mary Rosea, MD        potassium chloride (K-DUR,KLOR-CON) ER tablet 40 mEq  40 mEq Oral Once Paco,  Tai Arreaga MD        prochlorperazine (COMPAZINE) injection 10 mg  10 mg Intravenous Q6H PRN Mary Rose MD        sertraline (ZOLOFT) tablet 150 mg  150 mg Oral Daily Mary Rose MD        vitamin B-12 (CYANOCOBALAMIN) tablet 1,000 mcg  1,000 mcg Oral Daily Mary Rose MD           Allergies:  No Known Allergies    Social History:   Social History     Socioeconomic History    Marital status:      Spouse name: Rk   Tobacco Use    Smoking status: Never    Smokeless tobacco: Never   Vaping Use    Vaping Use: Never used   Substance and Sexual Activity    Alcohol use: No    Drug use: Never    Sexual activity: Yes     Partners: Male     Birth control/protection: Post-menopausal        Family History:  Family History   Problem Relation Age of Onset    Heart attack Mother     Coronary artery disease Mother         Mother  from MI at 72    Diabetes Mother     Breast cancer Mother     Hyperlipidemia Mother     Arthritis Mother     Cancer Mother     Heart attack Father     Aortic aneurysm Father         Father with ruptured thoracic aortic aneurysm    Coronary artery disease Father         Father  from MI at 74    Heart disease Father     Hyperlipidemia Father     Arthritis Father     Heart attack Maternal Grandmother     Coronary artery disease Maternal Grandmother         MGM deceeased from MI at age 76    Malig Hyperthermia Neg Hx         Review of Systems: as per HPI, in addition:    General:      No weakness / fatigue,                       No fevers / chills                       no weight loss  HEENT:       no dysphagia / odynophagia  Neck:           normal range of motion, no swelling  Respiratory: no cough / congestion                      No shortness of air                       No wheezing  CV:              No chest pain                       No palpitations  Abdomen/GI: Improved nausea, vomiting and diarrhea                      No abdominal  "pain  :             no dysuria / urinary frequency                       No urgency, normal output  Endocrine:   no polyuria / polydipsia,                      No heat or cold intolerance  Skin:           no rashes or skin breakdown   Vascular:   No edema                     No claudication  Psych:        no depression/ anxiety  Neuro:        no focal weakness, no seizures  Musculoskeletal: no joint pain or deformities      Physical Exam:  Vitals:   Temp (24hrs), Av.9 °F (36.6 °C), Min:97.5 °F (36.4 °C), Max:98.2 °F (36.8 °C)    BP 94/56 (BP Location: Right arm, Patient Position: Lying)   Pulse 87   Temp 98.1 °F (36.7 °C) (Oral)   Resp 16   Ht 170.2 cm (67\")   Wt (!) 172 kg (379 lb 3.1 oz)   LMP  (LMP Unknown)   SpO2 94%   BMI 59.39 kg/m²   Intake/Output:     Intake/Output Summary (Last 24 hours) at 2023 0905  Last data filed at 2023 1430  Gross per 24 hour   Intake 500 ml   Output --   Net 500 ml        Wt Readings from Last 1 Encounters:   23 0323 (!) 172 kg (379 lb 3.1 oz)   23 2046 (!) 172 kg (380 lb)   23 1159 (!) 169 kg (372 lb 9.2 oz)       Exam:    General Appearance:  Awake, alert, oriented x3, no acute distress  Obese, well-appearing   Head and Face:  Normocephalic, atraumatic, mucus membranes moist, oropharynx clear   Eyes:  No icterus, pupils equal round and reactive to light, extraocular movements intact    ENMT: Moist mucosa, tongue symmetric    Neck: Supple  no jugular venous distention  no thyromegaly   Pulmonary:  Respiratory effort: Normal  Auscultation of lungs: Clear bilaterally  No wheezes  No rhonchi  Good air movement, good expansion   Chest wall:  No tenderness or deformity   Cardiovascular:  Auscultation of the heart: Normal rhythm, no murmurs  Trace edema of bilateral lower extremities, upper extremity aVF   Abdomen:  Abdomen: soft, non-tender, normal bowel sounds all four quadrants, no masses   Liver and spleen: no hepatosplenomegaly "   Musculoskeletal: Digits and nails: normal  Normal range of motion  No joint swelling or gross deformities    Skin: Skin inspection: color normal, no visible rashes or lesions  Skin palpation: texture, turgor normal, no palpable lesions   Lymphatic:  no cervical lymphadenopathy    Psychiatric: Judgement and insight: normal  Orientation to person place and time: normal  Mood and affect: normal       DATA:  Radiology and Labs:  The following labs independently reviewed by me, additional AM labs ordered  Old records independently reviewed showing ESRD, refractory anemia due to chemotherapy  The following radiologic studies independently viewed by me, findings CT abdomen and pelvis showed large amount of ascites which is new she has smaller lymph nodes, atrophic kidneys seen patient   Interval notes, chart personally reviewed by me.  I have reviewed and summarized old records as detailed above  Plan of care discussed with herself at bedside, discussed plan of care with her RN  New problems include severe hypokalemia, refractory nausea vomiting and diarrhea    Dialysis patient access: Upper extremity aVF    Risk/ complexity of medical care/ medical decision making: Moderate complexity, severe electrolyte abnormalities and dialysis management  Chronic illness with severe exacerbation or progression: ESRD      Labs:   Recent Results (from the past 24 hour(s))   Comprehensive Metabolic Panel    Collection Time: 09/28/23  1:41 PM    Specimen: Blood   Result Value Ref Range    Glucose 178 (H) 65 - 99 mg/dL    BUN 26 (H) 8 - 23 mg/dL    Creatinine 6.85 (H) 0.57 - 1.00 mg/dL    Sodium 137 136 - 145 mmol/L    Potassium 2.8 (L) 3.5 - 5.2 mmol/L    Chloride 95 (L) 98 - 107 mmol/L    CO2 27.0 22.0 - 29.0 mmol/L    Calcium 8.7 8.6 - 10.5 mg/dL    Total Protein 6.9 6.0 - 8.5 g/dL    Albumin 2.7 (L) 3.5 - 5.2 g/dL    ALT (SGPT) 11 1 - 33 U/L    AST (SGOT) 12 1 - 32 U/L    Alkaline Phosphatase 104 39 - 117 U/L    Total Bilirubin 0.6  0.0 - 1.2 mg/dL    Globulin 4.2 gm/dL    A/G Ratio 0.6 g/dL    BUN/Creatinine Ratio 3.8 (L) 7.0 - 25.0    Anion Gap 15.0 5.0 - 15.0 mmol/L    eGFR 6.3 (L) >60.0 mL/min/1.73   Protime-INR    Collection Time: 09/28/23  1:41 PM    Specimen: Blood   Result Value Ref Range    Protime 16.3 (H) 11.7 - 14.2 Seconds    INR 1.29 (H) 0.90 - 1.10   Lipase    Collection Time: 09/28/23  1:41 PM    Specimen: Blood   Result Value Ref Range    Lipase 22 13 - 60 U/L   Lactic Acid, Plasma    Collection Time: 09/28/23  1:41 PM    Specimen: Blood   Result Value Ref Range    Lactate 1.2 0.5 - 2.0 mmol/L   Procalcitonin    Collection Time: 09/28/23  1:41 PM    Specimen: Blood   Result Value Ref Range    Procalcitonin 0.57 (H) 0.00 - 0.25 ng/mL   CBC Auto Differential    Collection Time: 09/28/23  1:41 PM    Specimen: Blood   Result Value Ref Range    WBC 2.64 (L) 3.40 - 10.80 10*3/mm3    RBC 2.25 (L) 3.77 - 5.28 10*6/mm3    Hemoglobin 7.4 (L) 12.0 - 15.9 g/dL    Hematocrit 22.6 (L) 34.0 - 46.6 %    .4 (H) 79.0 - 97.0 fL    MCH 32.9 26.6 - 33.0 pg    MCHC 32.7 31.5 - 35.7 g/dL    RDW 15.7 (H) 12.3 - 15.4 %    RDW-SD 57.6 (H) 37.0 - 54.0 fl    MPV 9.7 6.0 - 12.0 fL    Platelets 110 (L) 140 - 450 10*3/mm3    Neutrophil % 82.3 (H) 42.7 - 76.0 %    Lymphocyte % 9.8 (L) 19.6 - 45.3 %    Monocyte % 5.3 5.0 - 12.0 %    Eosinophil % 1.1 0.3 - 6.2 %    Basophil % 1.1 0.0 - 1.5 %    Immature Grans % 0.4 0.0 - 0.5 %    Neutrophils, Absolute 2.17 1.70 - 7.00 10*3/mm3    Lymphocytes, Absolute 0.26 (L) 0.70 - 3.10 10*3/mm3    Monocytes, Absolute 0.14 0.10 - 0.90 10*3/mm3    Eosinophils, Absolute 0.03 0.00 - 0.40 10*3/mm3    Basophils, Absolute 0.03 0.00 - 0.20 10*3/mm3    Immature Grans, Absolute 0.01 0.00 - 0.05 10*3/mm3    nRBC 0.0 0.0 - 0.2 /100 WBC   Magnesium    Collection Time: 09/28/23  5:11 PM    Specimen: Blood   Result Value Ref Range    Magnesium 1.9 1.6 - 2.4 mg/dL   ECG 12 Lead Rhythm Change    Collection Time: 09/29/23  3:33 AM    Result Value Ref Range    QT Interval 426 ms    QTC Interval 504 ms   POC Glucose Once    Collection Time: 09/29/23  6:12 AM    Specimen: Blood   Result Value Ref Range    Glucose 160 (H) 70 - 130 mg/dL   Basic Metabolic Panel    Collection Time: 09/29/23  6:45 AM    Specimen: Blood   Result Value Ref Range    Glucose 148 (H) 65 - 99 mg/dL    BUN 30 (H) 8 - 23 mg/dL    Creatinine 7.18 (H) 0.57 - 1.00 mg/dL    Sodium 135 (L) 136 - 145 mmol/L    Potassium 2.7 (L) 3.5 - 5.2 mmol/L    Chloride 97 (L) 98 - 107 mmol/L    CO2 25.0 22.0 - 29.0 mmol/L    Calcium 8.2 (L) 8.6 - 10.5 mg/dL    BUN/Creatinine Ratio 4.2 (L) 7.0 - 25.0    Anion Gap 13.0 5.0 - 15.0 mmol/L    eGFR 5.9 (L) >60.0 mL/min/1.73   CBC (No Diff)    Collection Time: 09/29/23  6:45 AM    Specimen: Blood   Result Value Ref Range    WBC 2.68 (L) 3.40 - 10.80 10*3/mm3    RBC 2.10 (L) 3.77 - 5.28 10*6/mm3    Hemoglobin 6.8 (C) 12.0 - 15.9 g/dL    Hematocrit 20.4 (C) 34.0 - 46.6 %    MCV 97.1 (H) 79.0 - 97.0 fL    MCH 32.4 26.6 - 33.0 pg    MCHC 33.3 31.5 - 35.7 g/dL    RDW 15.6 (H) 12.3 - 15.4 %    RDW-SD 55.3 (H) 37.0 - 54.0 fl    MPV 9.8 6.0 - 12.0 fL    Platelets 101 (L) 140 - 450 10*3/mm3       Radiology:  Imaging Results (Last 24 Hours)       Procedure Component Value Units Date/Time    CT Abdomen Pelvis Without Contrast [[id number]] Collected: 09/28/23 1514     Updated: 09/28/23 1522    Narrative:      Examination: CT of the abdomen and pelvis without contrast     Technique: CT of the abdomen and pelvis without contrast per protocol.  Radiation dose reduction techniques were utilized, including automated  exposure control and exposure modulation based on body size.        History: Abdominal pain, cancer, and diarrhea     Comparison: PET/CT of 10/6/2022.     Bone windows demonstrate degenerative changes, without suspicious  osseous lesion seen.       Impression:      Large amount of ascites. Incidental findings as above.     Findings: Limited evaluation of  the inferior thorax demonstrates  atelectasis, without consolidation, pleural effusion, or pneumothorax.  The heart is mildly enlarged, without pericardial effusion. Mitral  annular calcifications are seen.     There is a large amount of ascites. The kidneys are atrophic. There is a  left-sided double-J ureteral stent.     The liver, spleen, adrenal glands, pancreas, stomach, small bowel, large  bowel, uterus, and abdominal vasculature are normal as evaluated on this  noncontrast examination. No intraperitoneal free gas is seen. No  enlarged lymph nodes are demonstrated. Bilateral external iliac chain  lymph nodes are smaller, measuring up to 9 mm in short axis diameter.     Bone windows demonstrate degenerative changes, without suspicious  osseous lesion seen.     IMPRESSION:  1. Large amount of ascites, new     2. Smaller external iliac chain lymph nodes     3. Incidental findings as above.     This report was finalized on 9/28/2023 3:19 PM by Dr. Ashish Farmer M.D.                    ASSESSMENT:   ESRD on home hemodialysis 5 days/week  Severe hypokalemia due to GI losses  Intractable nausea vomiting and diarrhea, improved today  Metastatic breast cancer on immunotherapy  Diabetes mellitus type 2  Chronic anemia, in part due to immunotherapy, she is on high-dose BEAU as an outpatient    Intractable vomiting  Iron deficiency anemia on outpatient IV iron  Morbid obesity  Metabolic alkalosis from vomiting  Leukopenia and thrombocytopenia      DISCUSSION/PLAN:   Symptoms much improved overnight  Last dialysis was on Wednesday so we will plan a full HD treatment today  Will use a 4K bath for potassium correction and give additional oral potassium  Stop IV fluids since oral intake is improved  She is on outpatient Mircera for her anemia in addition to IV iron  Transfuse 1 unit packed red blood cells with HD today  Follow-up a.m. labs    No objections to discharge home in the morning if she is clinically stable and labs  "are improved.  She says she is leaving for vacation in [place] tomorrow afternoon  She has an outpatient prescription for potassium so if potassium is still low in the morning she should complete that prescription which is for KCl, 40 millequivalents x10 days and patient instructed to repeat labs in 2 weeks    Continue to monitor electrolytes and volume closely    I appreciate the consult request  Please send me a secure chat message if any questions regarding patient care.      Tai Antonio MD  [facility]  Office phone number: [phone]  Answering service phone number: [phone]      9/29/2023        Dictation via Dragon dictation software      Electronically signed by Tai Antonio MD at 09/29/23 0912       Juan Alberto Lozano MD at 09/29/23 0852        Consult Orders    1. Inpatient Infectious Diseases Consult [[id number]] ordered by Mary Rose MD at 09/28/23 2331                 Referring Provider: Cristina Guzman and Rose    Reason for Consultation: breast cancer, diarrhea     History of present illness:  Juana Hall is a 62 y.o. who I am asked to evaluate and give opinion for \"breast cancer, diarrhea.\" History is obtained from the patient and review of the old medical records which I summarize/synthesize as follows: She presented to the ER on 9/28/23 with nausea, vomiting, and weakness. She had associated shortness of breath worse w/ any activity. She reports chronic loose stools that she manages with Imodium. She had been seen in oncology office two days prior with similar symptoms. She had testing done that included a negative RPP, negative GI PCR and a C diff test with PCR positive but antigen negative. She was prescribed oral vancomycin.     She says it was really the ongoing vomiting that led her to come into the ER yesterday. It is now improved. She denies any fever. No concerning skin changes. She does not have a port. Her LUE AVF is doing well. No " erythema. No dysuria or flank pain. No cough.     She had a CT scan that did not show any colitis. It did show some ascites. She feels mildly distended but not tender.     She says she feels back to normal and wants to go home.     Past Medical History:   Diagnosis Date    Anemia     Anxiety and depression     Bicuspid aortic valve     had surgery    Breast cancer 2004    RIGHT, HAD NEELA. 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    History of sepsis 2010    KIDNEY STONE    History of transfusion     no reaction    Hyperlipidemia     Hyperthyroidism     Hypothyroidism     Kidney stone     CURRENT LEFT-DEC 2021    Lymphedema of leg     LEFT    Metastasis from breast cancer 2019    STOMACH-OMENTUM    Neuromuscular disorder     Obesity     ANDREW on CPAP     CPAP    Osteoarthrosis     Peripheral neuropathy     FEET BILAT    Radiculopathy     Rectal bleeding 08/16/2022    Thickened endometrium     Urinary incontinence     wears pads    Weakness     BILAT LEGS-WITH ANY AMT OF TIME       Past Surgical History:   Procedure Laterality Date    AORTIC VALVULOPLASTY  01/2023    ARTERIOVENOUS FISTULA/SHUNT SURGERY Left 11/03/2021    Procedure: LEFT  BRACHIOCEPALIC ARTERIOVENOUS FISTULA;  Surgeon: Dejuan Adler MD;  Location: [facility];  Service: Vascular;  Laterality: Left;    BREAST RECONSTRUCTION  2004    WITH IMPLANTS, AND REVISION, NOW REMOVED    BREAST SURGERY Bilateral 2004    breast  implants removed and then replaced due to infection    CARDIAC CATHETERIZATION N/A 2022    Procedure: CORONARY ANGIOGRAPHY;  Surgeon: Luis Rivers MD;  Location: [facility] CATH INVASIVE LOCATION;  Service: Cardiology;  Laterality: N/A;    CARDIAC CATHETERIZATION N/A 2022    Procedure: Right Heart Cath;  Surgeon: Luis Rivers MD;  Location: [facility] CATH INVASIVE LOCATION;  Service: Cardiology;  Laterality: N/A;    CARDIAC CATHETERIZATION N/A 01/10/2023    Procedure: Valvuloplasty;  Surgeon: Lius Rivers MD;  Location: [facility] CATH INVASIVE LOCATION;  Service: Cardiovascular;  Laterality: N/A;  01/10/2023    CARDIAC CATHETERIZATION N/A 01/10/2023    Procedure: Aortic root aortogram;  Surgeon: Luis Rivers MD;  Location: [facility] CATH INVASIVE LOCATION;  Service: Cardiovascular;  Laterality: N/A;    CATARACT EXTRACTION WITH INTRAOCULAR LENS IMPLANT Bilateral      SECTION  1987     SECTION  1987    CYSTOSCOPY W/ URETERAL STENT PLACEMENT      CYSTOSCOPY W/ URETERAL STENT PLACEMENT Left 2021    Procedure: CYSTOSCOPY KEFT RETROGRADE PYELOGRAM  LEFT  URETERAL STENT PLACEMENT;  Surgeon: Leodan Mckee Jr., MD;  Location: [facility];  Service: Urology;  Laterality: Left;    CYSTOSCOPY W/ URETERAL STENT PLACEMENT Left 03/10/2022    Procedure: CYSTOSCOPY AND LEFT URETERAL STENT EXCHANGE, RETROGRADE PYELOGRAM;  Surgeon: Leodan Mckee Jr., MD;  Location: [facility] OR;  Service: Urology;  Laterality: Left;    CYSTOSCOPY W/ URETERAL STENT PLACEMENT Left 2023    Procedure: CYSTOSCOPY WITH LEFT URETERAL STENT PLACEMENT, RETROGRADE PYELOGRAM, CLOT EVACUATION, BLADDER FULGARATION;  Surgeon: Leodan Mckee Jr., MD;  Location: [facility] OR;  Service: Urology;  Laterality: Left;    D & C HYSTEROSCOPY N/A 2017    Procedure: DILATATION AND CURETTAGE, HYSTEROSCOPY ;  Surgeon: Cherie Oliveros MD;  Location: [facility]  OR [facility];  Service:     D & C HYSTEROSCOPY N/A 09/19/2019    Procedure: DILATATION AND CURETTAGE HYSTEROSCOPY/POLYPECTOMY;  Surgeon: Cherie Oliveros MD;  Location: [facility] OR [facility];  Service: Gynecology    D & C HYSTEROSCOPY ENDOMETRIAL ABLATION N/A 5/5/2023    Procedure: DILATATION AND CURETTAGE;  Surgeon: Ina Marcos MD;  Location: [facility] MAIN OR;  Service: Obstetrics/Gynecology;  Laterality: N/A;    ENDOSCOPY      INSERTION AND REMOVAL HEMODIALYSIS CATHETER Right 05/01/2021    INSERTION HEMODIALYSIS CATHETER Right 05/01/2021    Procedure: HEMODIALYSIS CATHETER INSERTION;  Surgeon: Clint Hernández MD;  Location: [facility] MAIN OR;  Service: Vascular;  Laterality: Right;    LYMPH NODE BIOPSY      MASTECTOMY Bilateral 2004    VENOUS ACCESS DEVICE (PORT) INSERTION AND REMOVAL         Social History:  ; lives w/ her  in [place]  Retired teacher    Antibiotic allergies and intolerances:  None    Medications:    Current Facility-Administered Medications:     acetaminophen (TYLENOL) tablet 650 mg, 650 mg, Oral, Q4H PRN, Mary Rose MD    sennosides-docusate (PERICOLACE) 8.6-50 MG per tablet 2 tablet, 2 tablet, Oral, BID **AND** polyethylene glycol (MIRALAX) packet 17 g, 17 g, Oral, Daily PRN **AND** bisacodyl (DULCOLAX) EC tablet 5 mg, 5 mg, Oral, Daily PRN **AND** bisacodyl (DULCOLAX) suppository 10 mg, 10 mg, Rectal, Daily PRN, Mary Rose MD    dextrose (D50W) (25 g/50 mL) IV injection 25 g, 25 g, Intravenous, Q15 Min PRN, Mary Rose MD    dextrose (GLUTOSE) oral gel 15 g, 15 g, Oral, Q15 Min PRN, Mary oRse MD    folic acid (FOLVITE) tablet 1,000 mcg, 1,000 mcg, Oral, Daily, Mary Rose MD    gabapentin (NEURONTIN) capsule 300 mg, 300 mg, Oral, Nightly, Mary Rose MD    glucagon (GLUCAGEN) injection 1 mg, 1 mg, Intramuscular, Q15 Min PRN, Stingl, Mary Castillo MD    HYDROcodone-acetaminophen (NORCO) 5-325 MG per tablet 1 tablet, 1  tablet, Oral, Q4H PRN, Mary Rose MD    insulin lispro (HUMALOG/ADMELOG) injection 2-7 Units, 2-7 Units, Subcutaneous, 4x Daily AC & at Bedtime, Mary Rose MD    lactated ringers infusion, 50 mL/hr, Intravenous, Continuous, Mary Rose MD, Stopped at 09/29/23 0159    levothyroxine (SYNTHROID, LEVOTHROID) tablet 50 mcg, 50 mcg, Oral, Q AM, Mary Rose MD, 50 mcg at 09/29/23 0618    melatonin tablet 3 mg, 3 mg, Oral, Nightly PRN, Mary Rose MD    ondansetron (ZOFRAN) tablet 4 mg, 4 mg, Oral, Q6H PRN **OR** ondansetron (ZOFRAN) injection 4 mg, 4 mg, Intravenous, Q6H PRN, Mary Rose MD    prochlorperazine (COMPAZINE) injection 10 mg, 10 mg, Intravenous, Q6H PRN, Mary Rose MD    sertraline (ZOLOFT) tablet 150 mg, 150 mg, Oral, Daily, Mary Rose MD    sodium chloride 0.9 % infusion, 75 mL/hr, Intravenous, Continuous, Mary Rose MD, Last Rate: 75 mL/hr at 09/29/23 0159, 75 mL/hr at 09/29/23 0159    vitamin B-12 (CYANOCOBALAMIN) tablet 1,000 mcg, 1,000 mcg, Oral, Daily, Mary Rose MD      Objective   Vital Signs   Temp:  [97.5 °F (36.4 °C)-98.2 °F (36.8 °C)] 98.1 °F (36.7 °C)  Heart Rate:  [] 87  Resp:  [16-18] 16  BP: ()/(46-59) 94/56    Physical Exam:   General: awake, alert, NAD very nice  Eyes: no scleral icterus  ENT: no thrush  Cardiovascular: NR; 3/6 ALEJANDRO loudest at RUSB  Respiratory: normal work of breathing on RA  GI: Abdomen is soft, not tender, + bowel sounds in all four quadrants  :  no Medeiros catheter  Skin: No rashes; s/p mastectomy  Neurological: Alert and oriented x 3  Psychiatric: Normal mood and affect   Vasc: LUE AVF w/ thrill but no erythema    Labs:     Lab Results   Component Value Date    WBC 2.68 (L) 09/29/2023    HGB 6.8 (C) 09/29/2023    HCT 20.4 (C) 09/29/2023    MCV 97.1 (H) 09/29/2023     (L) 09/29/2023       Lab Results   Component Value Date    GLUCOSE 148  "(H) 2023    BUN 30 (H) 2023    CREATININE 7.18 (H) 2023    EGFRIFNONA 11 (L) 2022    EGFRIFAFRI  2022      Comment:      <15 Indicative of kidney failure.    BCR 4.2 (L) 2023    CO2 25.0 2023    CALCIUM 8.2 (L) 2023    PROTENTOTREF 7.0 2019    ALBUMIN 2.7 (L) 2023    LABIL2 1.2 2019    AST 12 2023    ALT 11 2023     Procal 0.57  INR 1.29  Lipase 22  LA 1.2    Microbiology:   C diff PCR positive but antigen negative   GI PCR: negative   RPP; negative   BCx: pending    Radiology:  CT A/P :   \"Large amount of ascites, new      2. Smaller external iliac chain lymph nodes \"    ASSESSMENT/PLAN:  C diff carrier  Bilious vomiting  Metastatic lobular breast cancer (ER/ND positive, HER-2/tj negative)  Pancytopenia  New onset ascites  ESRD on HD  Super obesity BMI 59  Mild-moderate aortic stenosis s/p valvuloplasty    Stool studies are most consistent w/ C diff carrier status (PCR positive but antigen negative). CT did not show any evidence of colitis. I agree w/ the decision not to resume oral vancomycin. Normally I might recommend a probiotic in this situation but with her immunocompromised state, that is not recommended. GI PCR is also negative. I'm not an expert on Vernezio but GI side effects are listed as possible side effects. This is now being held.     She is not on any antibiotics, and I do not see a role to initiate them at this point.     I will follow-up her blood culture results but will not plan to see again unless any new concerns for infection.     D/w Dr Hernandez.               Electronically signed by Juan Alberto Lozano MD at 23 1135       Luis Rivers MD at 23 [phone]        Consult Orders    1. Inpatient Cardiology Consult [[id number]] ordered by Ana Seals APRN at 23 1877                          Patient Name: Juana Hall  Age/Sex: 62 y.o. female  : " 1960  MRN: [id number]    Date of Admission: 9/28/2023  Date of Encounter Visit: 09/29/23  Encounter Provider: Luis Rivers MD  Place of Service: [facility] CARDIOLOGY      Referring Provider: No ref. provider found  Patient Care Team:  Kiana Noriega APRN ([place]) as PCP - General (Family Medicine)  Pasha Harkins MD as Referring Physician (Cardiology)  Leodan Irvni Jr., MD as Consulting Physician (Hematology and Oncology)  Tai Antonio MD as Consulting Physician (Nephrology)  Luis Rivers MD as Consulting Physician (Cardiology)  Leodan Mckee Jr., MD as Consulting Physician (Urology)    Subjective:     Admitted/Consulted for: New onset Atrial fibrillation    Chief Complaint: Vomiting and Diarrhea       History of Present Illness:  Juana Hall is a 62 y.o. female with a medical history of metastatic breast cancer, end-stage renal disease on hemodialysis, normocytic anemia, obesity, immobility, chronic normocytic anemia, and bicuspid aortic valve with severe stenosis who presents to me for evaluation of the severe aortic valve stenosis. She did undergo a transthoracic echocardiogram in July 2022 with severe degenerative aortic valve stenosis and a mean gradient of 45 mmHg across her bicuspid aortic valve. She subsequently was taken for balloon aortic valvuloplasty.  Her mean gradient initially was 46 mmHg and after a 20 and 22 mm Tyshak balloon we decreased that to 30 mmHg.  She did well with that and had no complications.      At her last office visit in May 2023, She had noticed improvement in her shortness of air since her valvuloplasty. She also had more energy. She was stable from a cardiac standpoint at that time.       She presented to the ED on 9/29/23 with complaints of vomiting and diarrhea. The day prior, she had a GI panel and C-diff test ordered at her oncologist office. The GI panel was negative, however, the C-diff test  was positive. She was started on oral vancomycin. The 24 hours PTA, patient states that she wasn't able to keep anything down and had severe diarrhea. Her oncologist recommend that she come to the hospital for further evaluation. Upon arrival to the ED, she was found to be in new atrial fibrillation.     ED workup: Potassium 2.8, BUN 26, Creatinine 6.85, Albumin 2.7, procal 0.57, PT/INR 16.3/1.29, WBC 2.64, HGB 7.4, CT abdomen and pelvis show new large amount of ascities, and smaller external iliac chain lymph nodes, measuring up to 9mm  EKG shows new atrial fibrillation rate 84. She also had a QTc of 504.    Cardiology has been asked to see this patient for new onset atrial fibrillation.  Ventricular rate controlled.    Previous testing:     Cath 1/10/23  Conclusions:   1.  Successful balloon aortic valvuloplasty with 20 mm and 22 mm Tyshak balloon.  2.  Decrease in mean gradient across aortic valve from 46 mmHg to 30 mmHg.  Residual moderate aortic valve stenosis.    Limited ECHO 1/10/23  Moderate to severe aortic valve stenosis is present.     ECHO 7/13/22    Left ventricular ejection fraction appears to be 56 - 60%. Left ventricular systolic function is normal.  Left ventricular wall thickness is consistent with mild concentric hypertrophy  Left ventricular diastolic function is consistent with (grade II w/high LAP) pseudonormalization.  Normal right ventricular cavity size and systolic function noted.  The left atrial cavity is mildly dilated.  Severe aortic valve stenosis is present.  Aortic valve area is 0.84 cm2. Peak velocity of the flow distal to the aortic valve is 449.3 cm/s. Aortic valve maximum pressure gradient is 80.8 mmHg. Aortic valve mean pressure gradient is 45.9 mmHg  There is severe mitral annular calcification the mitral valve leaflets are thickened and appear to be restricted in motion  Moderate mitral valve stenosis is present. The mitral valve peak gradient is 16.2 mmHg. The mitral valve  mean gradient is 6.2 mmHg.  Mild tricuspid valve regurgitation is present.  Calculated right ventricular systolic pressure from tricuspid regurgitation is 38 mmHg.  There is no evidence of pericardial effusion    Past Medical History:  Past Medical History:   Diagnosis Date    Anemia     Anxiety and depression     Bicuspid aortic valve     had surgery    Breast cancer 2004    RIGHT, HAD NEELA. 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    History of sepsis 2010    KIDNEY STONE    History of transfusion     no reaction    Hyperlipidemia     Hyperthyroidism     Hypothyroidism     Kidney stone     CURRENT LEFT-DEC 2021    Lymphedema of leg     LEFT    Metastasis from breast cancer 2019    STOMACH-OMENTUM    Neuromuscular disorder     Obesity     ANDREW on CPAP     CPAP    Osteoarthrosis     Peripheral neuropathy     FEET BILAT    Radiculopathy     Rectal bleeding 08/16/2022    Thickened endometrium     Urinary incontinence     wears pads    Weakness     BILAT LEGS-WITH ANY AMT OF TIME       Past Surgical History:   Procedure Laterality Date    AORTIC VALVULOPLASTY  01/2023    ARTERIOVENOUS FISTULA/SHUNT SURGERY Left 11/03/2021    Procedure: LEFT  BRACHIOCEPALIC ARTERIOVENOUS FISTULA;  Surgeon: Dejuan Adler MD;  Location: [facility];  Service: Vascular;  Laterality: Left;    BREAST RECONSTRUCTION  2004    WITH IMPLANTS, AND REVISION, NOW REMOVED    BREAST SURGERY Bilateral 2004    breast  implants removed and then replaced due to infection    CARDIAC CATHETERIZATION N/A 2022    Procedure: CORONARY ANGIOGRAPHY;  Surgeon: Luis Rivers MD;  Location: [facility] CATH INVASIVE LOCATION;  Service: Cardiology;  Laterality: N/A;    CARDIAC CATHETERIZATION N/A 2022    Procedure: Right Heart Cath;  Surgeon: Luis Rivers MD;  Location: [facility] CATH INVASIVE LOCATION;  Service: Cardiology;  Laterality: N/A;    CARDIAC CATHETERIZATION N/A 01/10/2023    Procedure: Valvuloplasty;  Surgeon: Luis Rivers MD;  Location: [facility] CATH INVASIVE LOCATION;  Service: Cardiovascular;  Laterality: N/A;  01/10/2023    CARDIAC CATHETERIZATION N/A 01/10/2023    Procedure: Aortic root aortogram;  Surgeon: Luis Rivers MD;  Location: [facility] CATH INVASIVE LOCATION;  Service: Cardiovascular;  Laterality: N/A;    CATARACT EXTRACTION WITH INTRAOCULAR LENS IMPLANT Bilateral      SECTION  1987     SECTION  1987    CYSTOSCOPY W/ URETERAL STENT PLACEMENT      CYSTOSCOPY W/ URETERAL STENT PLACEMENT Left 2021    Procedure: CYSTOSCOPY KEFT RETROGRADE PYELOGRAM  LEFT  URETERAL STENT PLACEMENT;  Surgeon: Leodan Mckee Jr., MD;  Location: [facility];  Service: Urology;  Laterality: Left;    CYSTOSCOPY W/ URETERAL STENT PLACEMENT Left 03/10/2022    Procedure: CYSTOSCOPY AND LEFT URETERAL STENT EXCHANGE, RETROGRADE PYELOGRAM;  Surgeon: Leodan Mckee Jr., MD;  Location: [facility] OR;  Service: Urology;  Laterality: Left;    CYSTOSCOPY W/ URETERAL STENT PLACEMENT Left 2023    Procedure: CYSTOSCOPY WITH LEFT URETERAL STENT PLACEMENT, RETROGRADE PYELOGRAM, CLOT EVACUATION, BLADDER FULGARATION;  Surgeon: Leodan Mckee Jr., MD;  Location: [facility] OR;  Service: Urology;  Laterality: Left;    D & C HYSTEROSCOPY N/A 2017    Procedure: DILATATION AND CURETTAGE, HYSTEROSCOPY ;  Surgeon: Cherie Oliveros MD;  Location: [facility]  OR [facility];  Service:     D & C HYSTEROSCOPY N/A 09/19/2019    Procedure: DILATATION AND CURETTAGE HYSTEROSCOPY/POLYPECTOMY;  Surgeon: Cherie Oliveros MD;  Location: [facility] OR [facility];  Service: Gynecology    D & C HYSTEROSCOPY ENDOMETRIAL ABLATION N/A 5/5/2023    Procedure: DILATATION AND CURETTAGE;  Surgeon: Ina Marcos MD;  Location: [facility] MAIN OR;  Service: Obstetrics/Gynecology;  Laterality: N/A;    ENDOSCOPY      INSERTION AND REMOVAL HEMODIALYSIS CATHETER Right 05/01/2021    INSERTION HEMODIALYSIS CATHETER Right 05/01/2021    Procedure: HEMODIALYSIS CATHETER INSERTION;  Surgeon: Clint Hernández MD;  Location: [facility] MAIN OR;  Service: Vascular;  Laterality: Right;    LYMPH NODE BIOPSY      MASTECTOMY Bilateral 2004    VENOUS ACCESS DEVICE (PORT) INSERTION AND REMOVAL         Home Medications:   Medications Prior to Admission   Medication Sig Dispense Refill Last Dose    Abemaciclib (Verzenio) 100 MG tablet Take 1 tablet by mouth 2 (Two) Times a Day. 56 tablet 5 Past Month    atorvastatin (LIPITOR) 40 MG tablet TAKE ONE TABLET BY MOUTH ONCE NIGHTLY 30 tablet 0 9/27/2023    diphenoxylate-atropine (LOMOTIL) 2.5-0.025 MG per tablet Take 1 tablet by mouth 4 (Four) Times a Day As Needed for Diarrhea. 30 tablet 0 9/27/2023    insulin detemir (Levemir FlexPen) 100 UNIT/ML injection Inject 50 Units under the skin into the appropriate area as directed Daily. 11 mL 0 Past Week    vancomycin (Vancocin) 125 MG capsule Take 1 capsule by mouth 4 (Four) Times a Day for 14 days. 56 capsule 0 9/27/2023    acetaminophen (TYLENOL) 500 MG tablet Take 2 tablets by mouth As Needed for Mild Pain.       folic acid (FOLVITE) 1 MG tablet TAKE 1 TABLET BY MOUTH DAILY 30 tablet 2     Fulvestrant (FASLODEX) 250 MG/5ML chemo syringe Inject  into the appropriate muscle as directed by prescriber Every 30 (Thirty) Days. In MD office       gabapentin (Neurontin) 300 MG capsule Take 1 capsule by mouth Every Night. 60 capsule 2      HYDROcodone-acetaminophen (NORCO) 5-325 MG per tablet Take 1-2 tablets by mouth Every 4 (Four) Hours As Needed for Moderate Pain (Pain). 12 tablet 0     levothyroxine (SYNTHROID, LEVOTHROID) 50 MCG tablet Take 1 tablet by mouth Daily. (Patient taking differently: Take 1 tablet by mouth Every Morning.) 90 tablet 1     mupirocin (BACTROBAN) 2 % ointment Apply 1 application  topically to the appropriate area as directed Daily. Applied to fistula insertion sites for dialysis       ondansetron (ZOFRAN) 8 MG tablet TAKE ONE TABLET BY MOUTH EVERY 8 HOURS AS NEEDED FOR NAUSEA OR VOMITING 18 tablet 2     sertraline (ZOLOFT) 100 MG tablet TAKE 1 AND 1/2 TABLET BY MOUTH DAILY 135 tablet 0     vitamin B-12 (CYANOCOBALAMIN) 1000 MCG tablet Take 1 tablet by mouth Daily.          Allergies:  No Known Allergies    Past Social History:  Social History     Socioeconomic History    Marital status:      Spouse name: Rk   Tobacco Use    Smoking status: Never    Smokeless tobacco: Never   Vaping Use    Vaping Use: Never used   Substance and Sexual Activity    Alcohol use: No    Drug use: Never    Sexual activity: Yes     Partners: Male     Birth control/protection: Post-menopausal        Past Family History:  Family History   Problem Relation Age of Onset    Heart attack Mother     Coronary artery disease Mother         Mother  from MI at 72    Diabetes Mother     Breast cancer Mother     Hyperlipidemia Mother     Arthritis Mother     Cancer Mother     Heart attack Father     Aortic aneurysm Father         Father with ruptured thoracic aortic aneurysm    Coronary artery disease Father         Father  from MI at 74    Heart disease Father     Hyperlipidemia Father     Arthritis Father     Heart attack Maternal Grandmother     Coronary artery disease Maternal Grandmother         MGM deceeased from MI at age 76    Malig Hyperthermia Neg Hx          Review of Systems:  All systems reviewed. Pertinent positives  "identified in HPI. All other systems are negative.     Objective:     Objective:  Temp:  [97.5 °F (36.4 °C)-98.2 °F (36.8 °C)] 98.1 °F (36.7 °C)  Heart Rate:  [] 87  Resp:  [16-18] 16  BP: ()/(46-59) 94/56    Intake/Output Summary (Last 24 hours) at 9/29/2023 0919  Last data filed at 9/28/2023 1430  Gross per 24 hour   Intake 500 ml   Output --   Net 500 ml     Body mass index is 59.39 kg/m².      09/28/23  1159 09/28/23 2046 09/29/23  0323   Weight: (!) 169 kg (372 lb 9.2 oz) (!) 172 kg (380 lb) (!) 172 kg (379 lb 3.1 oz)           Physical Exam:   Temp:  [97.5 °F (36.4 °C)-98.2 °F (36.8 °C)] 98.1 °F (36.7 °C)  Heart Rate:  [] 87  Resp:  [16-18] 16  BP: ()/(46-59) 94/56    Intake/Output Summary (Last 24 hours) at 9/29/2023 0919  Last data filed at 9/28/2023 1430  Gross per 24 hour   Intake 500 ml   Output --   Net 500 ml     Flowsheet Rows      Flowsheet Row First Filed Value   Admission Height 170.2 cm (67\") Documented at 09/28/2023 1159   Admission Weight 169 kg (372 lb 9.2 oz) Documented at 09/28/2023 1159            General Appearance:    Alert, cooperative, in no acute distress   Head:    Normocephalic, without obvious abnormality, atraumatic       Neck/Lymph   No adenopathy, supple, no thyromegaly, no carotid bruit, no    JVD   Lungs:     Clear to auscultation bilaterally, no wheezes, rales, or     rhonchi    Cardiac:    Normal rate, irregularly, irregular rhythm, no murmur, no rub, no gallop   Chest Wall:    No abnormalities observed   GI:     Normal bowel sounds, soft, nontender, nondistended,            no rebound tenderness   Extremities:   No cyanosis, clubbing, or edema   Circulatory/Peripheral Vascular :   Pulses palpable and equal bilaterally   Integumentary:   No bleeding or rash. Normal temperature                Lab Review:     Results from last 7 days   Lab Units 09/29/23  0645 09/28/23  1341 09/26/23  1006   SODIUM mmol/L 135* 137 135*   POTASSIUM mmol/L 2.7* 2.8* 2.9* "   CHLORIDE mmol/L 97* 95* 96*   CO2 mmol/L 25.0 27.0 21.6*   BUN mg/dL 30* 26* 34*   CREATININE mg/dL 7.18* 6.85* 7.74*   GLUCOSE mg/dL 148* 178* 117*   CALCIUM mg/dL 8.2* 8.7 8.5*           Results from last 7 days   Lab Units 09/29/23  0645   WBC 10*3/mm3 2.68*   HEMOGLOBIN g/dL 6.8*   HEMATOCRIT % 20.4*   PLATELETS 10*3/mm3 101*     Results from last 7 days   Lab Units 09/28/23  1341   INR  1.29*         Results from last 7 days   Lab Units 09/28/23  1711   MAGNESIUM mg/dL 1.9                       Imaging:    Imaging Results (Most Recent)       Procedure Component Value Units Date/Time    CT Abdomen Pelvis Without Contrast [[id number]] Collected: 09/28/23 1514     Updated: 09/28/23 1522    Narrative:      Examination: CT of the abdomen and pelvis without contrast     Technique: CT of the abdomen and pelvis without contrast per protocol.  Radiation dose reduction techniques were utilized, including automated  exposure control and exposure modulation based on body size.        History: Abdominal pain, cancer, and diarrhea     Comparison: PET/CT of 10/6/2022.     Bone windows demonstrate degenerative changes, without suspicious  osseous lesion seen.       Impression:      Large amount of ascites. Incidental findings as above.     Findings: Limited evaluation of the inferior thorax demonstrates  atelectasis, without consolidation, pleural effusion, or pneumothorax.  The heart is mildly enlarged, without pericardial effusion. Mitral  annular calcifications are seen.     There is a large amount of ascites. The kidneys are atrophic. There is a  left-sided double-J ureteral stent.     The liver, spleen, adrenal glands, pancreas, stomach, small bowel, large  bowel, uterus, and abdominal vasculature are normal as evaluated on this  noncontrast examination. No intraperitoneal free gas is seen. No  enlarged lymph nodes are demonstrated. Bilateral external iliac chain  lymph nodes are smaller, measuring up to 9 mm in short  axis diameter.     Bone windows demonstrate degenerative changes, without suspicious  osseous lesion seen.     IMPRESSION:  1. Large amount of ascites, new     2. Smaller external iliac chain lymph nodes     3. Incidental findings as above.     This report was finalized on 2023 3:19 PM by Dr. Ashish Farmer M.D.               Results for orders placed during the hospital encounter of 01/10/23    Adult Transthoracic Echo Limited W/ Cont if Necessary Per Protocol    Interpretation Summary    Moderate to severe aortic valve stenosis is present.      EK23          BASELINE:  23        I personally viewed and interpreted the patient's EKG/Telemetry data.    Assessment:   Assessment & Plan   1.  Vomiting and diarrhea  2.  End-stage renal disease on hemodialysis  3.  Metastatic breast cancer  4.  Atrial fibrillation: With controlled ventricular rate  5.  History of severe degenerative aortic valve stenosis: Status post balloon aortic valvuloplasty.  Residual moderate aortic valve stenosis.  6.  Chronic anemia    -Fortunately the patient's ventricular rate is well controlled in atrial fibrillation.  She does not have much blood pressure room to add additional therapies for rate control.  We will continue to follow but I would leave off of AV varun blocking agents at this time  - In the setting of her anemia and ongoing therapies for her malignancy I would not recommend anticoagulant therapy.  - Packed cells today with dialysis.            Thank you for allowing me to participate in the care of Juana Hall. Feel free to contact me directly with any further questions or concerns.    Luis Rivers MD  [facility]  23  09:19 EDT     Electronically signed by Luis Rivers MD at 23 1343

## 2023-09-29 NOTE — CONSULTS
Patient Name: Juana Hall  Age/Sex: 62 y.o. female  : 1960  MRN: [id number]    Date of Admission: 2023  Date of Encounter Visit: 23  Encounter Provider: Luis Rivers MD  Place of Service: [facility] CARDIOLOGY      Referring Provider: No ref. provider found  Patient Care Team:  Kiana Noriega)BETY as PCP - General (Family Medicine)  Pasha Harkins MD as Referring Physician (Cardiology)  Leodan Irvin Jr., MD as Consulting Physician (Hematology and Oncology)  Tai Antonio MD as Consulting Physician (Nephrology)  Luis Rivers MD as Consulting Physician (Cardiology)  Leodan Mckee Jr., MD as Consulting Physician (Urology)    Subjective:     Admitted/Consulted for: New onset Atrial fibrillation    Chief Complaint: Vomiting and Diarrhea       History of Present Illness:  Juana Hall is a 62 y.o. female with a medical history of metastatic breast cancer, end-stage renal disease on hemodialysis, normocytic anemia, obesity, immobility, chronic normocytic anemia, and bicuspid aortic valve with severe stenosis who presents to me for evaluation of the severe aortic valve stenosis. She did undergo a transthoracic echocardiogram in 2022 with severe degenerative aortic valve stenosis and a mean gradient of 45 mmHg across her bicuspid aortic valve. She subsequently was taken for balloon aortic valvuloplasty.  Her mean gradient initially was 46 mmHg and after a 20 and 22 mm Tyshak balloon we decreased that to 30 mmHg.  She did well with that and had no complications.      At her last office visit in May 2023, She had noticed improvement in her shortness of air since her valvuloplasty. She also had more energy. She was stable from a cardiac standpoint at that time.       She presented to the ED on 23 with complaints of vomiting and diarrhea. The day prior, she had a GI panel and C-diff test ordered at her  oncologist office. The GI panel was negative, however, the C-diff test was positive. She was started on oral vancomycin. The 24 hours PTA, patient states that she wasn't able to keep anything down and had severe diarrhea. Her oncologist recommend that she come to the hospital for further evaluation. Upon arrival to the ED, she was found to be in new atrial fibrillation.     ED workup: Potassium 2.8, BUN 26, Creatinine 6.85, Albumin 2.7, procal 0.57, PT/INR 16.3/1.29, WBC 2.64, HGB 7.4, CT abdomen and pelvis show new large amount of ascities, and smaller external iliac chain lymph nodes, measuring up to 9mm  EKG shows new atrial fibrillation rate 84. She also had a QTc of 504.    Cardiology has been asked to see this patient for new onset atrial fibrillation.  Ventricular rate controlled.    Previous testing:     Cath 1/10/23  Conclusions:   1.  Successful balloon aortic valvuloplasty with 20 mm and 22 mm Tyshak balloon.  2.  Decrease in mean gradient across aortic valve from 46 mmHg to 30 mmHg.  Residual moderate aortic valve stenosis.    Limited ECHO 1/10/23  Moderate to severe aortic valve stenosis is present.     ECHO 7/13/22    Left ventricular ejection fraction appears to be 56 - 60%. Left ventricular systolic function is normal.  Left ventricular wall thickness is consistent with mild concentric hypertrophy  Left ventricular diastolic function is consistent with (grade II w/high LAP) pseudonormalization.  Normal right ventricular cavity size and systolic function noted.  The left atrial cavity is mildly dilated.  Severe aortic valve stenosis is present.  Aortic valve area is 0.84 cm2. Peak velocity of the flow distal to the aortic valve is 449.3 cm/s. Aortic valve maximum pressure gradient is 80.8 mmHg. Aortic valve mean pressure gradient is 45.9 mmHg  There is severe mitral annular calcification the mitral valve leaflets are thickened and appear to be restricted in motion  Moderate mitral valve stenosis is  present. The mitral valve peak gradient is 16.2 mmHg. The mitral valve mean gradient is 6.2 mmHg.  Mild tricuspid valve regurgitation is present.  Calculated right ventricular systolic pressure from tricuspid regurgitation is 38 mmHg.  There is no evidence of pericardial effusion    Past Medical History:  Past Medical History:   Diagnosis Date    Anemia     Anxiety and depression     Bicuspid aortic valve     had surgery    Breast cancer 2004    RIGHT, HAD NEELA. 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    History of sepsis 2010    KIDNEY STONE    History of transfusion     no reaction    Hyperlipidemia     Hyperthyroidism     Hypothyroidism     Kidney stone     CURRENT LEFT-DEC 2021    Lymphedema of leg     LEFT    Metastasis from breast cancer 2019    STOMACH-OMENTUM    Neuromuscular disorder     Obesity     ANDREW on CPAP     CPAP    Osteoarthrosis     Peripheral neuropathy     FEET BILAT    Radiculopathy     Rectal bleeding 08/16/2022    Thickened endometrium     Urinary incontinence     wears pads    Weakness     BILAT LEGS-WITH ANY AMT OF TIME       Past Surgical History:   Procedure Laterality Date    AORTIC VALVULOPLASTY  01/2023    ARTERIOVENOUS FISTULA/SHUNT SURGERY Left 11/03/2021    Procedure: LEFT  BRACHIOCEPALIC ARTERIOVENOUS FISTULA;  Surgeon: Dejuan Adler MD;  Location: [facility];  Service: Vascular;  Laterality: Left;    BREAST RECONSTRUCTION  2004    WITH IMPLANTS,  AND REVISION, NOW REMOVED    BREAST SURGERY Bilateral 2004    breast implants removed and then replaced due to infection    CARDIAC CATHETERIZATION N/A 2022    Procedure: CORONARY ANGIOGRAPHY;  Surgeon: Luis Rivers MD;  [facility];  Service: Cardiology;  Laterality: N/A;    CARDIAC CATHETERIZATION N/A 2022    Procedure: Right Heart Cath;  Surgeon: Luis Rivers MD;  [facility];  Service: Cardiology;  Laterality: N/A;    CARDIAC CATHETERIZATION N/A 01/10/2023    Procedure: Valvuloplasty;  Surgeon: Luis Rivers MD;  [facility];  Service: Cardiovascular;  Laterality: N/A;  01/10/2023    CARDIAC CATHETERIZATION N/A 01/10/2023    Procedure: Aortic root aortogram;  Surgeon: Luis Rivers MD;  Location: [facility] CATH INVASIVE LOC[facility];  Service: Cardiovascular;  Laterality: N/A;    CATARACT EXTRACTION WITH INTRAOCULAR LENS IMPLANT Bilateral      SECTION  1987     SECTION  1987    CYSTOSCOPY W/ URETERAL STENT PLACEMENT  2010    CYSTOSCOPY W/ URETERAL STENT PLACEMENT Left 2021    Procedure: CYSTOSCOPY KEFT RETROGRADE PYELOGRAM  LEFT  URETERAL STENT PLACEMENT;  Surgeon: Leodan Mckee Jr., MD;  Location: [facility];  Service: Urology;  Laterality: Left;    CYSTOSCOPY W/ URETERAL STENT PLACEMENT Left 03/10/2022    Procedure: CYSTOSCOPY AND LEFT URETERAL STENT EXCHANGE, RETROGRADE PYELOGRAM;  Surgeon: Leodan Mckee Jr., MD;  Location: [facility] OR;  Service: Urology;  Laterality: Left;    CYSTOSCOPY W/ URETERAL STENT PLACEMENT Left 2023    Procedure: CYSTOSCOPY WITH LEFT URETERAL STENT PLACEMENT, RETROGRADE PYELOGRAM, CLOT EVACUATION, BLADDER FULGARATION;  Surgeon: Leodan Mckee Jr., MD;  Location: [facility];  Service: Urology;  Laterality: Left;    D & C HYSTEROSCOPY N/A 2017    Procedure: DILATATION AND  CURETTAGE, HYSTEROSCOPY ;  Surgeon: Cherie Oliveros MD;  [facility];  Service:     D & C HYSTEROSCOPY N/A 09/19/2019    Procedure: DILATATION AND CURETTAGE HYSTEROSCOPY/POLYPECTOMY;  Surgeon: Cherie Oliveros MD;  Location:  [facility];  Service: Gynecology    D & C HYSTEROSCOPY ENDOMETRIAL ABLATION N/A 5/5/2023    Procedure: DILATATION AND CURETTAGE;  Surgeon: Ina Marcos MD;  [facility];  Service: Obstetrics/Gynecology;  Laterality: N/A;    ENDOSCOPY      INSERTION AND REMOVAL HEMODIALYSIS CATHETER Right 05/01/2021    INSERTION HEMODIALYSIS CATHETER Right 05/01/2021    Procedure: HEMODIALYSIS CATHETER INSERTION;  Surgeon: Clint Hernández MD;  Location: [facility] MAIN OR;  Service: Vascular;  Laterality: Right;    LYMPH NODE BIOPSY      MASTECTOMY Bilateral 2004    VENOUS ACCESS DEVICE (PORT) INSERTION AND REMOVAL         Home Medications:   Medications Prior to Admission   Medication Sig Dispense Refill Last Dose    Abemaciclib (Verzenio) 100 MG tablet Take 1 tablet by mouth 2 (Two) Times a Day. 56 tablet 5 Past Month    atorvastatin (LIPITOR) 40 MG tablet TAKE ONE TABLET BY MOUTH ONCE NIGHTLY 30 tablet 0 9/27/2023    diphenoxylate-atropine (LOMOTIL) 2.5-0.025 MG per tablet Take 1 tablet by mouth 4 (Four) Times a Day As Needed for Diarrhea. 30 tablet 0 9/27/2023    insulin detemir (Levemir FlexPen) 100 UNIT/ML injection Inject 50 Units under the skin into the appropriate area as directed Daily. 11 mL 0 Past Week    vancomycin (Vancocin) 125 MG capsule Take 1 capsule by mouth 4 (Four) Times a Day for 14 days. 56 capsule 0 9/27/2023    acetaminophen (TYLENOL) 500 MG tablet Take 2 tablets by mouth As Needed for Mild Pain.       folic acid (FOLVITE) 1 MG tablet TAKE 1 TABLET BY MOUTH DAILY 30 tablet 2     Fulvestrant (FASLODEX) 250 MG/5ML chemo syringe Inject  into the appropriate muscle as directed by prescriber Every 30 (Thirty) Days. In MD office       gabapentin (Neurontin) 300 MG  capsule Take 1 capsule by mouth Every Night. 60 capsule 2     HYDROcodone-acetaminophen (NORCO) 5-325 MG per tablet Take 1-2 tablets by mouth Every 4 (Four) Hours As Needed for Moderate Pain (Pain). 12 tablet 0     levothyroxine (SYNTHROID, LEVOTHROID) 50 MCG tablet Take 1 tablet by mouth Daily. (Patient taking differently: Take 1 tablet by mouth Every Morning.) 90 tablet 1     mupirocin (BACTROBAN) 2 % ointment Apply 1 application  topically to the appropriate area as directed Daily. Applied to fistula insertion sites for dialysis       ondansetron (ZOFRAN) 8 MG tablet TAKE ONE TABLET BY MOUTH EVERY 8 HOURS AS NEEDED FOR NAUSEA OR VOMITING 18 tablet 2     sertraline (ZOLOFT) 100 MG tablet TAKE 1 AND 1/2 TABLET BY MOUTH DAILY 135 tablet 0     vitamin B-12 (CYANOCOBALAMIN) 1000 MCG tablet Take 1 tablet by mouth Daily.          Allergies:  No Known Allergies    Past Social History:  Social History     Socioeconomic History    Marital status:      Spouse name: Rk   Tobacco Use    Smoking status: Never    Smokeless tobacco: Never   Vaping Use    Vaping Use: Never used   Substance and Sexual Activity    Alcohol use: No    Drug use: Never    Sexual activity: Yes     Partners: Male     Birth control/protection: Post-menopausal        Past Family History:  Family History   Problem Relation Age of Onset    Heart attack Mother     Coronary artery disease Mother         Mother  from MI at 72    Diabetes Mother     Breast cancer Mother     Hyperlipidemia Mother     Arthritis Mother     Cancer Mother     Heart attack Father     Aortic aneurysm Father         Father with ruptured thoracic aortic aneurysm    Coronary artery disease Father         Father  from MI at 74    Heart disease Father     Hyperlipidemia Father     Arthritis Father     Heart attack Maternal Grandmother     Coronary artery disease Maternal Grandmother         MGM deceeased from MI at age 76    Malig Hyperthermia Neg Hx   "        Review of Systems:  All systems reviewed. Pertinent positives identified in HPI. All other systems are negative.     Objective:     Objective:  Temp:  [97.5 °F (36.4 °C)-98.2 °F (36.8 °C)] 98.1 °F (36.7 °C)  Heart Rate:  [] 87  Resp:  [16-18] 16  BP: ()/(46-59) 94/56    Intake/Output Summary (Last 24 hours) at 9/29/2023 0919  Last data filed at 9/28/2023 1430  Gross per 24 hour   Intake 500 ml   Output --   Net 500 ml     Body mass index is 59.39 kg/m².      09/28/23  1159 09/28/23 2046 09/29/23  0323   Weight: (!) 169 kg (372 lb 9.2 oz) (!) 172 kg (380 lb) (!) 172 kg (379 lb 3.1 oz)           Physical Exam:   Temp:  [97.5 °F (36.4 °C)-98.2 °F (36.8 °C)] 98.1 °F (36.7 °C)  Heart Rate:  [] 87  Resp:  [16-18] 16  BP: ()/(46-59) 94/56    Intake/Output Summary (Last 24 hours) at 9/29/2023 0919  Last data filed at 9/28/2023 1430  Gross per 24 hour   Intake 500 ml   Output --   Net 500 ml     Flowsheet Rows      Flowsheet Row First Filed Value   Admission Height 170.2 cm (67\") Documented at 09/28/2023 1159   Admission Weight 169 kg (372 lb 9.2 oz) Documented at 09/28/2023 1159            General Appearance:    Alert, cooperative, in no acute distress   Head:    Normocephalic, without obvious abnormality, atraumatic       Neck/Lymph   No adenopathy, supple, no thyromegaly, no carotid bruit, no    JVD   Lungs:     Clear to auscultation bilaterally, no wheezes, rales, or     rhonchi    Cardiac:    Normal rate, irregularly, irregular rhythm, no murmur, no rub, no gallop   Chest Wall:    No abnormalities observed   GI:     Normal bowel sounds, soft, nontender, nondistended,            no rebound tenderness   Extremities:   No cyanosis, clubbing, or edema   Circulatory/Peripheral Vascular :   Pulses palpable and equal bilaterally   Integumentary:   No bleeding or rash. Normal temperature                Lab Review:     Results from last 7 days   Lab Units 09/29/23  0645 09/28/23  1341 " 09/26/23  1006   SODIUM mmol/L 135* 137 135*   POTASSIUM mmol/L 2.7* 2.8* 2.9*   CHLORIDE mmol/L 97* 95* 96*   CO2 mmol/L 25.0 27.0 21.6*   BUN mg/dL 30* 26* 34*   CREATININE mg/dL 7.18* 6.85* 7.74*   GLUCOSE mg/dL 148* 178* 117*   CALCIUM mg/dL 8.2* 8.7 8.5*           Results from last 7 days   Lab Units 09/29/23  0645   WBC 10*3/mm3 2.68*   HEMOGLOBIN g/dL 6.8*   HEMATOCRIT % 20.4*   PLATELETS 10*3/mm3 101*     Results from last 7 days   Lab Units 09/28/23  1341   INR  1.29*         Results from last 7 days   Lab Units 09/28/23  1711   MAGNESIUM mg/dL 1.9                       Imaging:    Imaging Results (Most Recent)       Procedure Component Value Units Date/Time    CT Abdomen Pelvis Without Contrast [[id number]] Collected: 09/28/23 1514     Updated: 09/28/23 1522    Narrative:      Examination: CT of the abdomen and pelvis without contrast     Technique: CT of the abdomen and pelvis without contrast per protocol.  Radiation dose reduction techniques were utilized, including automated  exposure control and exposure modulation based on body size.        History: Abdominal pain, cancer, and diarrhea     Comparison: PET/CT of 10/6/2022.     Bone windows demonstrate degenerative changes, without suspicious  osseous lesion seen.       Impression:      Large amount of ascites. Incidental findings as above.     Findings: Limited evaluation of the inferior thorax demonstrates  atelectasis, without consolidation, pleural effusion, or pneumothorax.  The heart is mildly enlarged, without pericardial effusion. Mitral  annular calcifications are seen.     There is a large amount of ascites. The kidneys are atrophic. There is a  left-sided double-J ureteral stent.     The liver, spleen, adrenal glands, pancreas, stomach, small bowel, large  bowel, uterus, and abdominal vasculature are normal as evaluated on this  noncontrast examination. No intraperitoneal free gas is seen. No  enlarged lymph nodes are demonstrated. Bilateral  external iliac chain  lymph nodes are smaller, measuring up to 9 mm in short axis diameter.     Bone windows demonstrate degenerative changes, without suspicious  osseous lesion seen.     IMPRESSION:  1. Large amount of ascites, new     2. Smaller external iliac chain lymph nodes     3. Incidental findings as above.     This report was finalized on 2023 3:19 PM by Dr. Ashish Farmer M.D.               Results for orders placed during the hospital encounter of 01/10/23    Adult Transthoracic Echo Limited W/ Cont if Necessary Per Protocol    Interpretation Summary    Moderate to severe aortic valve stenosis is present.      EK23          BASELINE:  23        I personally viewed and interpreted the patient's EKG/Telemetry data.    Assessment:   Assessment & Plan   1.  Vomiting and diarrhea  2.  End-stage renal disease on hemodialysis  3.  Metastatic breast cancer  4.  Atrial fibrillation: With controlled ventricular rate  5.  History of severe degenerative aortic valve stenosis: Status post balloon aortic valvuloplasty.  Residual moderate aortic valve stenosis.  6.  Chronic anemia    -Fortunately the patient's ventricular rate is well controlled in atrial fibrillation.  She does not have much blood pressure room to add additional therapies for rate control.  We will continue to follow but I would leave off of AV varun blocking agents at this time  - In the setting of her anemia and ongoing therapies for her malignancy I would not recommend anticoagulant therapy.  - Packed cells today with dialysis.            Thank you for allowing me to participate in the care of Juana DIANNA Isabel. Feel free to contact me directly with any further questions or concerns.    Luis Rivers MD  [facility]  23  09:19 EDT

## 2023-09-29 NOTE — PLAN OF CARE
Goal Outcome Evaluation:  Plan of Care Reviewed With: patient           Outcome Evaluation: Pt resting quietly in bed, watching on her tablet, denies any discomfort, no nausea or diarrhea at this shift, voided x1, incontinent, eating fair, blood glucose under 150, HD nurse here now, will transfuse 1u PRBC with dialysis,  has order for Benadryl inj, vss, sr on tele, hr controlled 76, room air, satting 96%, weight shifting per pt, heels elevated using pillows, new tx for skin applied.

## 2023-09-29 NOTE — CONSULTS
"Referring Provider: Cristina Guzman and Rose    Reason for Consultation: breast cancer, diarrhea     History of present illness:  Juana Hall is a 62 y.o. who I am asked to evaluate and give opinion for \"breast cancer, diarrhea.\" History is obtained from the patient and review of the old medical records which I summarize/synthesize as follows: She presented to the ER on 9/28/23 with nausea, vomiting, and weakness. She had associated shortness of breath worse w/ any activity. She reports chronic loose stools that she manages with Imodium. She had been seen in oncology office two days prior with similar symptoms. She had testing done that included a negative RPP, negative GI PCR and a C diff test with PCR positive but antigen negative. She was prescribed oral vancomycin.     She says it was really the ongoing vomiting that led her to come into the ER yesterday. It is now improved. She denies any fever. No concerning skin changes. She does not have a port. Her LUE AVF is doing well. No erythema. No dysuria or flank pain. No cough.     She had a CT scan that did not show any colitis. It did show some ascites. She feels mildly distended but not tender.     She says she feels back to normal and wants to go home.     Past Medical History:   Diagnosis Date    Anemia     Anxiety and depression     Bicuspid aortic valve     had surgery    Breast cancer 2004    RIGHT, HAD NEELA. 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    History of sepsis     KIDNEY STONE    History of transfusion     no reaction    Hyperlipidemia     Hyperthyroidism     Hypothyroidism     Kidney stone     CURRENT LEFT-DEC 2021    Lymphedema of leg     LEFT    Metastasis from breast cancer 2019    STOMACH-OMENTUM    Neuromuscular disorder     Obesity     ANDREW on CPAP     CPAP    Osteoarthrosis     Peripheral neuropathy     FEET BILAT    Radiculopathy     Rectal bleeding 2022    Thickened endometrium     Urinary incontinence     wears pads    Weakness     BILAT LEGS-WITH ANY AMT OF TIME       Past Surgical History:   Procedure Laterality Date    AORTIC VALVULOPLASTY  2023    ARTERIOVENOUS FISTULA/SHUNT SURGERY Left 2021    Procedure: LEFT  BRACHIOCEPALIC ARTERIOVENOUS FISTULA;  Surgeon: Dejuan Adler MD;  Location: [facility] MAIN OR;  Service: Vascular;  Laterality: Left;    BREAST RECONSTRUCTION      WITH IMPLANTS, AND REVISION, NOW REMOVED    BREAST SURGERY Bilateral     breast implants removed and then replaced due to infection    CARDIAC CATHETERIZATION N/A 2022    Procedure: CORONARY ANGIOGRAPHY;  Surgeon: Luis Rivers MD;  Location: [facility] CATH INVASIVE LOCATION;  Service: Cardiology;  Laterality: N/A;    CARDIAC CATHETERIZATION N/A 2022    Procedure: Right Heart Cath;  Surgeon: Luis Rivers MD;  Location: [facility] CATH INVASIVE LOCATION;  Service: Cardiology;  Laterality: N/A;    CARDIAC CATHETERIZATION N/A 01/10/2023    Procedure: Valvuloplasty;  Surgeon: Luis Rivers MD;  Location: [facility] CATH INVASIVE LOCATION;  Service: Cardiovascular;  Laterality: N/A;  01/10/2023    CARDIAC CATHETERIZATION N/A 01/10/2023    Procedure: Aortic root aortogram;  Surgeon: Luis Rivers MD;  Location: [facility] CATH INVASIVE LOCATION;  Service: Cardiovascular;  Laterality: N/A;    CATARACT EXTRACTION WITH INTRAOCULAR LENS IMPLANT Bilateral      SECTION  1987      SECTION  1987    CYSTOSCOPY W/ URETERAL STENT PLACEMENT  2010    CYSTOSCOPY W/ URETERAL STENT PLACEMENT Left 2021    Procedure: CYSTOSCOPY KEFT RETROGRADE PYELOGRAM  LEFT  URETERAL STENT PLACEMENT;  Surgeon: Leodan Mckee Jr., MD;  Location: [facility] MAIN OR;  Service: Urology;  Laterality: Left;    CYSTOSCOPY W/ URETERAL STENT PLACEMENT Left 03/10/2022    Procedure: CYSTOSCOPY AND LEFT URETERAL STENT EXCHANGE, RETROGRADE PYELOGRAM;  Surgeon: Leodan Mckee Jr., MD;  Location: [facility] MAIN OR;  Service: Urology;  Laterality: Left;    CYSTOSCOPY W/ URETERAL STENT PLACEMENT Left 2023    Procedure: CYSTOSCOPY WITH LEFT URETERAL STENT PLACEMENT, RETROGRADE PYELOGRAM, CLOT EVACUATION, BLADDER FULGARATION;  Surgeon: Leodan Mckee Jr., MD;  Location: [facility] MAIN OR;  Service: Urology;  Laterality: Left;    D & C HYSTEROSCOPY N/A 2017    Procedure: DILATATION AND CURETTAGE, HYSTEROSCOPY ;  Surgeon: Cherie Oliveros MD;  Location: [facility] OR [facility];  Service:     D & C HYSTEROSCOPY N/A 2019    Procedure: DILATATION AND CURETTAGE HYSTEROSCOPY/POLYPECTOMY;  Surgeon: Cherie Oliveros MD;  [facility];  Service: Gynecology    D & C HYSTEROSCOPY ENDOMETRIAL ABLATION N/A 2023    Procedure: DILATATION AND CURETTAGE;  Surgeon: Ina Marcos MD;  Location: [facility] MAIN OR;  Service: Obstetrics/Gynecology;  Laterality: N/A;    ENDOSCOPY      INSERTION AND REMOVAL HEMODIALYSIS CATHETER Right 2021    INSERTION HEMODIALYSIS CATHETER Right 2021    Procedure: HEMODIALYSIS CATHETER INSERTION;  Surgeon: Clint Hernández MD;  Location: [facility] MAIN OR;  Service: Vascular;  Laterality: Right;    LYMPH NODE BIOPSY      MASTECTOMY Bilateral     VENOUS ACCESS DEVICE (PORT) INSERTION AND REMOVAL         Social History:  ; lives w/ her  in [place]  Retired teacher    Antibiotic allergies and intolerances:  None    Medications:    Current  Facility-Administered Medications:     acetaminophen (TYLENOL) tablet 650 mg, 650 mg, Oral, Q4H PRN, Mary Rose MD    sennosides-docusate (PERICOLACE) 8.6-50 MG per tablet 2 tablet, 2 tablet, Oral, BID **AND** polyethylene glycol (MIRALAX) packet 17 g, 17 g, Oral, Daily PRN **AND** bisacodyl (DULCOLAX) EC tablet 5 mg, 5 mg, Oral, Daily PRN **AND** bisacodyl (DULCOLAX) suppository 10 mg, 10 mg, Rectal, Daily PRN, Mary Rose MD    dextrose (D50W) (25 g/50 mL) IV injection 25 g, 25 g, Intravenous, Q15 Min PRN, Mary Rose MD    dextrose (GLUTOSE) oral gel 15 g, 15 g, Oral, Q15 Min PRN, Mary Rose MD    folic acid (FOLVITE) tablet 1,000 mcg, 1,000 mcg, Oral, Daily, StingMary enrique MD    gabapentin (NEURONTIN) capsule 300 mg, 300 mg, Oral, Nightly, Mary Rose MD    glucagon (GLUCAGEN) injection 1 mg, 1 mg, Intramuscular, Q15 Min PRN, Mary Rose MD    HYDROcodone-acetaminophen (NORCO) 5-325 MG per tablet 1 tablet, 1 tablet, Oral, Q4H PRN, Mary Rose MD    insulin lispro (HUMALOG/ADMELOG) injection 2-7 Units, 2-7 Units, Subcutaneous, 4x Daily AC & at Bedtime, Mary Rose MD    lactated ringers infusion, 50 mL/hr, Intravenous, Continuous, Mary Rose MD, Stopped at 09/29/23 0159    levothyroxine (SYNTHROID, LEVOTHROID) tablet 50 mcg, 50 mcg, Oral, Q AM, Mary Rose MD, 50 mcg at 09/29/23 0618    melatonin tablet 3 mg, 3 mg, Oral, Nightly PRN, Mary Rose MD    ondansetron (ZOFRAN) tablet 4 mg, 4 mg, Oral, Q6H PRN **OR** ondansetron (ZOFRAN) injection 4 mg, 4 mg, Intravenous, Q6H PRN, Mary Rose MD    prochlorperazine (COMPAZINE) injection 10 mg, 10 mg, Intravenous, Q6H PRN, Mary Rose MD    sertraline (ZOLOFT) tablet 150 mg, 150 mg, Oral, Daily, Mary Rose MD    sodium chloride 0.9 % infusion, 75 mL/hr, Intravenous, Continuous, Mary Rose MD,  "Last Rate: 75 mL/hr at 09/29/23 0159, 75 mL/hr at 09/29/23 0159    vitamin B-12 (CYANOCOBALAMIN) tablet 1,000 mcg, 1,000 mcg, Oral, Daily, Mary Rose MD      Objective   Vital Signs   Temp:  [97.5 °F (36.4 °C)-98.2 °F (36.8 °C)] 98.1 °F (36.7 °C)  Heart Rate:  [] 87  Resp:  [16-18] 16  BP: ()/(46-59) 94/56    Physical Exam:   General: awake, alert, NAD very nice  Eyes: no scleral icterus  ENT: no thrush  Cardiovascular: NR; 3/6 ALEJANDRO loudest at RUSB  Respiratory: normal work of breathing on RA  GI: Abdomen is soft, not tender, + bowel sounds in all four quadrants  :  no Medeiros catheter  Skin: No rashes; s/p mastectomy  Neurological: Alert and oriented x 3  Psychiatric: Normal mood and affect   Vasc: LUE AVF w/ thrill but no erythema    Labs:     Lab Results   Component Value Date    WBC 2.68 (L) 09/29/2023    HGB 6.8 (C) 09/29/2023    HCT 20.4 (C) 09/29/2023    MCV 97.1 (H) 09/29/2023     (L) 09/29/2023       Lab Results   Component Value Date    GLUCOSE 148 (H) 09/29/2023    BUN 30 (H) 09/29/2023    CREATININE 7.18 (H) 09/29/2023    EGFRIFNONA 11 (L) 02/16/2022    EGFRIFAFRI  01/29/2022      Comment:      <15 Indicative of kidney failure.    BCR 4.2 (L) 09/29/2023    CO2 25.0 09/29/2023    CALCIUM 8.2 (L) 09/29/2023    PROTENTOTREF 7.0 03/16/2019    ALBUMIN 2.7 (L) 09/28/2023    LABIL2 1.2 03/16/2019    AST 12 09/28/2023    ALT 11 09/28/2023     Procal 0.57  INR 1.29  Lipase 22  LA 1.2    Microbiology:  9/26 C diff PCR positive but antigen negative  9/26 GI PCR: negative  9/26 RPP; negative  9/28 BCx: pending    Radiology:  CT A/P 9/28:   \"Large amount of ascites, new      2. Smaller external iliac chain lymph nodes \"    ASSESSMENT/PLAN:  C diff carrier  Bilious vomiting  Metastatic lobular breast cancer (ER/[place] positive, HER-2/tj negative)  Pancytopenia  New onset ascites  ESRD on HD  Super obesity BMI 59  Mild-moderate aortic stenosis s/p valvuloplasty    Stool studies are most " consistent w/ C diff carrier status (PCR positive but antigen negative). CT did not show any evidence of colitis. I agree w/ the decision not to resume oral vancomycin. Normally I might recommend a probiotic in this situation but with her immunocompromised state, that is not recommended. GI PCR is also negative. I'm not an expert on Vernezio but GI side effects are listed as possible side effects. This is now being held.     She is not on any antibiotics, and I do not see a role to initiate them at this point.     I will follow-up her blood culture results but will not plan to see again unless any new concerns for infection.     D/w Dr Hernandez.

## 2023-09-29 NOTE — CASE MANAGEMENT/SOCIAL WORK
Continued Stay Note  [facility]     Patient Name: Juana Hall  MRN: [id number]  Today's Date: 9/29/2023    Admit Date: 9/28/2023    Plan: Plan home with spouse.   JEFF Chapin RN   Discharge Plan       Row Name 09/29/23 0805       Plan    Plan Plan home with spouse.   JEFF Chapin RN    Patient/Family in Agreement with Plan yes    Plan Comments FACE SHEET VERIFIED.  Spoke with pt at bedside.  Pt's PCP is BETY Sarabia. Pt lives with her spouse ( [phone]) in a single story house.  Pt is independent with ADLs.  Pt has a  C PAP, cane, glucometer, rollator, shower chair, and electric scooter for home use if needed.  Pt gets her prescriptions at [facility]  ([facility]  Pt denies any issues affording medications. Pt is not current with .  Pt has not been in SNF.  Pt denies any discharge needs.  Pt's spouse will assist pt at home and transport pt home.  Plan home with spouse.   JEFF Chapin RN                   Discharge Codes    No documentation.                       Lashonda Chapin RN

## 2023-09-30 ENCOUNTER — READMISSION MANAGEMENT (OUTPATIENT)
Dept: CALL CENTER | Facility: HOSPITAL | Age: 63
End: 2023-09-30
Payer: COMMERCIAL

## 2023-09-30 ENCOUNTER — APPOINTMENT (OUTPATIENT)
Dept: ULTRASOUND IMAGING | Facility: HOSPITAL | Age: 63
DRG: 391 | End: 2023-09-30
Payer: COMMERCIAL

## 2023-09-30 VITALS
RESPIRATION RATE: 18 BRPM | SYSTOLIC BLOOD PRESSURE: 116 MMHG | HEART RATE: 79 BPM | HEIGHT: 67 IN | WEIGHT: 293 LBS | OXYGEN SATURATION: 95 % | TEMPERATURE: 98.4 F | BODY MASS INDEX: 45.99 KG/M2 | DIASTOLIC BLOOD PRESSURE: 59 MMHG

## 2023-09-30 LAB
ANION GAP SERPL CALCULATED.3IONS-SCNC: 16 MMOL/L (ref 5–15)
BH BB BLOOD EXPIRATION DATE: NORMAL
BH BB BLOOD TYPE BARCODE: 6200
BH BB DISPENSE STATUS: NORMAL
BH BB PRODUCT CODE: NORMAL
BH BB UNIT NUMBER: NORMAL
BUN SERPL-MCNC: 23 MG/DL (ref 8–23)
BUN/CREAT SERPL: 4.5 (ref 7–25)
CALCIUM SPEC-SCNC: 8.3 MG/DL (ref 8.6–10.5)
CHLORIDE SERPL-SCNC: 96 MMOL/L (ref 98–107)
CO2 SERPL-SCNC: 23 MMOL/L (ref 22–29)
CREAT SERPL-MCNC: 5.16 MG/DL (ref 0.57–1)
CROSSMATCH INTERPRETATION: NORMAL
DEPRECATED RDW RBC AUTO: 55.4 FL (ref 37–54)
EGFRCR SERPLBLD CKD-EPI 2021: 8.8 ML/MIN/1.73
ERYTHROCYTE [DISTWIDTH] IN BLOOD BY AUTOMATED COUNT: 15.7 % (ref 12.3–15.4)
GLUCOSE BLDC GLUCOMTR-MCNC: 170 MG/DL (ref 70–130)
GLUCOSE SERPL-MCNC: 183 MG/DL (ref 65–99)
HCT VFR BLD AUTO: 24 % (ref 34–46.6)
HGB BLD-MCNC: 7.8 G/DL (ref 12–15.9)
MAGNESIUM SERPL-MCNC: 2.6 MG/DL (ref 1.6–2.4)
MCH RBC QN AUTO: 31.8 PG (ref 26.6–33)
MCHC RBC AUTO-ENTMCNC: 32.5 G/DL (ref 31.5–35.7)
MCV RBC AUTO: 98 FL (ref 79–97)
PLATELET # BLD AUTO: 95 10*3/MM3 (ref 140–450)
PMV BLD AUTO: 9.8 FL (ref 6–12)
POTASSIUM SERPL-SCNC: 3.6 MMOL/L (ref 3.5–5.2)
RBC # BLD AUTO: 2.45 10*6/MM3 (ref 3.77–5.28)
SODIUM SERPL-SCNC: 135 MMOL/L (ref 136–145)
UNIT  ABO: NORMAL
UNIT  RH: NORMAL
WBC NRBC COR # BLD: 3.3 10*3/MM3 (ref 3.4–10.8)

## 2023-09-30 PROCEDURE — 80048 BASIC METABOLIC PNL TOTAL CA: CPT | Performed by: INTERNAL MEDICINE

## 2023-09-30 PROCEDURE — 83735 ASSAY OF MAGNESIUM: CPT | Performed by: INTERNAL MEDICINE

## 2023-09-30 PROCEDURE — 82948 REAGENT STRIP/BLOOD GLUCOSE: CPT

## 2023-09-30 PROCEDURE — 97530 THERAPEUTIC ACTIVITIES: CPT

## 2023-09-30 PROCEDURE — 85027 COMPLETE CBC AUTOMATED: CPT | Performed by: STUDENT IN AN ORGANIZED HEALTH CARE EDUCATION/TRAINING PROGRAM

## 2023-09-30 PROCEDURE — 25010000002 ONDANSETRON PER 1 MG: Performed by: INTERNAL MEDICINE

## 2023-09-30 PROCEDURE — 97162 PT EVAL MOD COMPLEX 30 MIN: CPT

## 2023-09-30 PROCEDURE — 99232 SBSQ HOSP IP/OBS MODERATE 35: CPT | Performed by: INTERNAL MEDICINE

## 2023-09-30 RX ADMIN — Medication 1000 MCG: at 08:40

## 2023-09-30 RX ADMIN — LEVOTHYROXINE SODIUM 50 MCG: 50 TABLET ORAL at 05:58

## 2023-09-30 RX ADMIN — SERTRALINE 150 MG: 100 TABLET, FILM COATED ORAL at 08:40

## 2023-09-30 RX ADMIN — FOLIC ACID 1000 MCG: 1 TABLET ORAL at 08:40

## 2023-09-30 RX ADMIN — ONDANSETRON 4 MG: 2 INJECTION INTRAMUSCULAR; INTRAVENOUS at 09:03

## 2023-09-30 RX ADMIN — ONDANSETRON 4 MG: 2 INJECTION INTRAMUSCULAR; INTRAVENOUS at 00:29

## 2023-09-30 RX ADMIN — ANORECTAL OINTMENT 1 APPLICATION: 15.7; .44; 24; 20.6 OINTMENT TOPICAL at 08:41

## 2023-09-30 NOTE — DISCHARGE SUMMARY
Patient Name: Juana Hall  : 1960  MRN: [id number]    Date of Admission: 2023  Date of Discharge:  2023  Primary Care Physician: Kiana Noriega APRN ([place])      Chief Complaint:   Vomiting      Discharge Diagnoses     Active Hospital Problems    Diagnosis  POA    **Intractable vomiting [R11.10]  Yes      Resolved Hospital Problems   No resolved problems to display.        Hospital Course       This is a 62-year-old woman with metastatic breast cancer, aortic stenosis status post valvuloplasty, congestive heart failure, ESRD on home hemodialysis who presents to the hospital after experiencing refractory diarrhea, thought to be secondary to recurrent C. difficile.  She underwent a CT of the abdomen pelvis that showed large amount of ascites.  Her C. difficile PCR was positive however the C. difficile antigen was negative.  In addition she did not exhibit any leukocytosis or fever.  Infectious disease was consulted and confirmed that she did not have active C. difficile but was instead a carrier.   In any case, her hemoglobin was 6.8 on the morning after admission.  She received 1 unit of PRBCs with dialysis.  She opted to hold off on dialysis as she will be leaving for a family vacation on the day of discharge, 2023.  She should follow-up with her oncologist for further management of the ascites.      Physical Exam:  Temp:  [97.5 °F (36.4 °C)-98.4 °F (36.9 °C)] 98.4 °F (36.9 °C)  Heart Rate:  [70-79] 79  Resp:  [18] 18  BP: ()/(46-64) 116/59  Body mass index is 58.91 kg/m².  Physical Exam  Constitutional:       Comments: Chronically ill appearing    HENT:      Head: Normocephalic and atraumatic.   Cardiovascular:      Rate and Rhythm: Normal rate and regular rhythm.   Pulmonary:      Effort: Pulmonary effort is normal. No respiratory distress.   Abdominal:      General: There is distension.      Tenderness: There is no abdominal tenderness.   Neurological:      General: No focal  deficit present.      Mental Status: She is alert and oriented to person, place, and time.       Consultants     Consult Orders (all) (From admission, onward)       Start     Ordered    09/29/23 0507  Inpatient Case Management  Consult  Once        Provider:  (Not yet assigned)    09/29/23 0507    09/29/23 0456  Inpatient Cardiology Consult  Once        Specialty:  Cardiology  Provider:  Luis Rivers MD    09/29/23 0456    09/28/23 2332  Inpatient Infectious Diseases Consult  Once        Specialty:  Infectious Diseases  Provider:  Lety Nieves MD    09/28/23 2331    09/28/23 2331  Inpatient Hematology & Oncology Consult  Once        Specialty:  Hematology and Oncology  Provider:  Gibson Roque II, MD    09/28/23 2331    09/28/23 1709  Family Medicine Consult  Once        Specialty:  Family Medicine  Provider:  Juan Alberto Lozano MD    09/28/23 1710    09/28/23 1657  [facility] (on-call MD unless specified) Details  Once        Specialty:  Hospitalist  Provider:  (Not yet assigned)    09/28/23 1656    09/28/23 1643  Nephrology (on -call MD unless specified)  Once        Specialty:  Nephrology  Provider:  Tai Antonio MD    09/28/23 1642    09/28/23 1642  Hematology and Oncology (on-call MD unless specified)  Once        Specialty:  Hematology and Oncology  Provider:  (Not yet assigned)    09/28/23 1642                  Procedures     Imaging Results (All)       Procedure Component Value Units Date/Time    CT Abdomen Pelvis Without Contrast [[id number]] Collected: 09/28/23 1514     Updated: 09/28/23 1522    Narrative:      Examination: CT of the abdomen and pelvis without contrast     Technique: CT of the abdomen and pelvis without contrast per protocol.  Radiation dose reduction techniques were utilized, including automated  exposure control and exposure modulation based on body size.        History: Abdominal pain, cancer, and diarrhea     Comparison: PET/CT of 10/6/2022.      Bone windows demonstrate degenerative changes, without suspicious  osseous lesion seen.       Impression:      Large amount of ascites. Incidental findings as above.     Findings: Limited evaluation of the inferior thorax demonstrates  atelectasis, without consolidation, pleural effusion, or pneumothorax.  The heart is mildly enlarged, without pericardial effusion. Mitral  annular calcifications are seen.     There is a large amount of ascites. The kidneys are atrophic. There is a  left-sided double-J ureteral stent.     The liver, spleen, adrenal glands, pancreas, stomach, small bowel, large  bowel, uterus, and abdominal vasculature are normal as evaluated on this  noncontrast examination. No intraperitoneal free gas is seen. No  enlarged lymph nodes are demonstrated. Bilateral external iliac chain  lymph nodes are smaller, measuring up to 9 mm in short axis diameter.     Bone windows demonstrate degenerative changes, without suspicious  osseous lesion seen.     IMPRESSION:  1. Large amount of ascites, new     2. Smaller external iliac chain lymph nodes     3. Incidental findings as above.     This report was finalized on 9/28/2023 3:19 PM by Dr. Ashish Farmer M.D.               Pertinent Labs     Results from last 7 days   Lab Units 09/30/23  0711 09/29/23  0645 09/28/23  1341 09/26/23  1006   WBC 10*3/mm3 3.30* 2.68* 2.64* 3.59   HEMOGLOBIN g/dL 7.8* 6.8* 7.4* 8.1*   PLATELETS 10*3/mm3 95* 101* 110* 121*     Results from last 7 days   Lab Units 09/30/23  1028 09/29/23  0645 09/28/23  1341 09/26/23  1006   SODIUM mmol/L 135* 135* 137 135*   POTASSIUM mmol/L 3.6 2.7* 2.8* 2.9*   CHLORIDE mmol/L 96* 97* 95* 96*   CO2 mmol/L 23.0 25.0 27.0 21.6*   BUN mg/dL 23 30* 26* 34*   CREATININE mg/dL 5.16* 7.18* 6.85* 7.74*   GLUCOSE mg/dL 183* 148* 178* 117*   Estimated Creatinine Clearance: 18.3 mL/min (A) (by C-G formula based on SCr of 5.16 mg/dL (H)).  Results from last 7 days   Lab Units 09/28/23  1341 09/26/23  1006    ALBUMIN g/dL 2.7* 3.0*   BILIRUBIN mg/dL 0.6 0.7   ALK PHOS U/L 104 100   AST (SGOT) U/L 12 26   ALT (SGPT) U/L 11 6     Results from last 7 days   Lab Units 09/30/23  1028 09/29/23  0645 09/28/23  1711 09/28/23  1341 09/26/23  1006   CALCIUM mg/dL 8.3* 8.2*  --  8.7 8.5*   ALBUMIN g/dL  --   --   --  2.7* 3.0*   MAGNESIUM mg/dL 2.6*  --  1.9  --   --      Results from last 7 days   Lab Units 09/28/23  1341   LIPASE U/L 22             Invalid input(s): LDLCALC  Results from last 7 days   Lab Units 09/28/23  1711 09/28/23  1341   BLOODCX  No growth at 24 hours No growth at 24 hours       Test Results Pending at Discharge     Pending Labs       Order Current Status    Blood Culture - Blood, Arm, Right Preliminary result    Blood Culture - Blood, Arm, Right Preliminary result            Discharge Details        Discharge Medications        Changes to Medications        Instructions Start Date   levothyroxine 50 MCG tablet  Commonly known as: SYNTHROID, LEVOTHROID  What changed: when to take this   50 mcg, Oral, Daily             Continue These Medications        Instructions Start Date   acetaminophen 500 MG tablet  Commonly known as: TYLENOL   1,000 mg, Oral, As Needed      atorvastatin 40 MG tablet  Commonly known as: LIPITOR   TAKE ONE TABLET BY MOUTH ONCE NIGHTLY      diphenoxylate-atropine 2.5-0.025 MG per tablet  Commonly known as: LOMOTIL   1 tablet, Oral, 4 Times Daily PRN      folic acid 1 MG tablet  Commonly known as: FOLVITE   TAKE 1 TABLET BY MOUTH DAILY      Fulvestrant 250 MG/5ML chemo syringe  Commonly known as: FASLODEX   Intramuscular, Every 30 Days, In MD office      gabapentin 300 MG capsule  Commonly known as: Neurontin   300 mg, Oral, Nightly      HYDROcodone-acetaminophen 5-325 MG per tablet  Commonly known as: NORCO   1-2 tablets, Oral, Every 4 Hours PRN      Levemir FlexPen 100 UNIT/ML injection  Generic drug: insulin detemir   50 Units, Subcutaneous, Daily      mupirocin 2 %  ointment  Commonly known as: BACTROBAN   1 application , Topical, Daily, Applied to fistula insertion sites for dialysis       ondansetron 8 MG tablet  Commonly known as: ZOFRAN   TAKE ONE TABLET BY MOUTH EVERY 8 HOURS AS NEEDED FOR NAUSEA OR VOMITING      sertraline 100 MG tablet  Commonly known as: ZOLOFT   TAKE 1 AND 1/2 TABLET BY MOUTH DAILY      vancomycin 125 MG capsule  Commonly known as: Vancocin   125 mg, Oral, 4 Times Daily      Verzenio 100 MG tablet  Generic drug: Abemaciclib   100 mg, Oral, 2 Times Daily      vitamin B-12 1000 MCG tablet  Commonly known as: CYANOCOBALAMIN   1,000 mcg, Oral, Daily               No Known Allergies      Discharge Disposition:  Home or Self Care    Discharge Diet:  Diet Order   Procedures    Diet: Cardiac Diets; Healthy Heart (2-3 Na+); Texture: Regular Texture (IDDSI 7); Fluid Consistency: Thin (IDDSI 0)       Discharge Activity:       CODE STATUS:    Code Status and Medical Interventions:   Ordered at: 09/28/23 2153     Code Status (Patient has no pulse and is not breathing):    CPR (Attempt to Resuscitate)     Medical Interventions (Patient has pulse or is breathing):    Full       Future Appointments   Date Time Provider Department Center   10/9/2023  8:00 AM [facility] PET ADMIN RM 1  [facility] PET [facility]   10/9/2023  9:15 AM HAY PET 1  HAY PET HAY   10/18/2023  7:40 AM LAB CHAIR 5 [facility]  LAB [facility] [place]   10/18/2023  8:00 AM Leodan Irvin Jr., MD [facility] CBC [facility] [place]   10/18/2023  8:45 AM INJECTION CHAIR [facility] INFUS [facility]RE [facility]   11/17/2023  9:15 AM HAY [facility] ECHO/VAS RM 1  LCG ECHO HAY   11/17/2023 10:00 AM Sherine Badillo APRN [facility] CD LCGKR HAY      Follow-up Information       Kiana Noriega APRN ([place]) .    Specialty: Family Medicine  Contact information:  [address] [place] [id number] [phone]                             Time Spent on Discharge:  Greater than 30 minutes      Jason Hernandez MD  [facility]  09/30/23  11:30 EDT

## 2023-09-30 NOTE — PROGRESS NOTES
"RENAL/KCC:     LOS: 2 days    Patient Care Team:  Kiana Noriega)BETY as PCP - General (Family Medicine)  Pasha Harkins MD as Referring Physician (Cardiology)  Leodan Irvin Jr., MD as Consulting Physician (Hematology and Oncology)  Tai Antonio MD as Consulting Physician (Nephrology)  Luis Rivers MD as Consulting Physician (Cardiology)  Leodan Mckee Jr., MD as Consulting Physician (Urology)    Chief Complaint:  ESRD, Hypokalemia    Subjective     Interval History:   Chart reviewed  Feeling well    Objective     Vital Sign Min/Max for last 24 hours  Temp  Min: 97.5 °F (36.4 °C)  Max: 98.4 °F (36.9 °C)   BP  Min: 96/46  Max: 128/51   Pulse  Min: 70  Max: 79   Resp  Min: 18  Max: 18   SpO2  Min: 95 %  Max: 97 %   No data recorded   Weight  Min: 171 kg (376 lb 1.7 oz)  Max: 171 kg (376 lb 1.7 oz)     Flowsheet Rows      Flowsheet Row First Filed Value   Admission Height 170.2 cm (67\") Documented at 09/28/2023 1159   Admission Weight 169 kg (372 lb 9.2 oz) Documented at 09/28/2023 1159            I/O this shift:  In: 100 [P.O.:100]  Out: -   I/O last 3 completed shifts:  In: 735 [P.O.:420; Blood:315]  Out: -     Physical Exam:  GEN: Awake, NAD  ENT: PERRL, EOMI, MMM  NECK: Supple, no JVD  CHEST: CTAB, no W/R/C  CV: RRR, no M/G/R  ABD: Soft, NT, +BS  SKIN: Warm and Dry  NEURO: CN's intact      WBC WBC   Date Value Ref Range Status   09/30/2023 3.30 (L) 3.40 - 10.80 10*3/mm3 Final   09/29/2023 2.68 (L) 3.40 - 10.80 10*3/mm3 Final   09/28/2023 2.64 (L) 3.40 - 10.80 10*3/mm3 Final      HGB Hemoglobin   Date Value Ref Range Status   09/30/2023 7.8 (L) 12.0 - 15.9 g/dL Final   09/29/2023 6.8 (C) 12.0 - 15.9 g/dL Final   09/28/2023 7.4 (L) 12.0 - 15.9 g/dL Final      HCT Hematocrit   Date Value Ref Range Status   09/30/2023 24.0 (L) 34.0 - 46.6 % Final   09/29/2023 20.4 (C) 34.0 - 46.6 % Final   09/28/2023 22.6 (L) 34.0 - 46.6 % Final      Platlets No results found for: LABPLAT   MCV " MCV   Date Value Ref Range Status   09/30/2023 98.0 (H) 79.0 - 97.0 fL Final   09/29/2023 97.1 (H) 79.0 - 97.0 fL Final   09/28/2023 100.4 (H) 79.0 - 97.0 fL Final          Sodium Sodium   Date Value Ref Range Status   09/29/2023 135 (L) 136 - 145 mmol/L Final   09/28/2023 137 136 - 145 mmol/L Final      Potassium Potassium   Date Value Ref Range Status   09/29/2023 2.7 (L) 3.5 - 5.2 mmol/L Final   09/28/2023 2.8 (L) 3.5 - 5.2 mmol/L Final      Chloride Chloride   Date Value Ref Range Status   09/29/2023 97 (L) 98 - 107 mmol/L Final   09/28/2023 95 (L) 98 - 107 mmol/L Final      CO2 CO2   Date Value Ref Range Status   09/29/2023 25.0 22.0 - 29.0 mmol/L Final   09/28/2023 27.0 22.0 - 29.0 mmol/L Final      BUN BUN   Date Value Ref Range Status   09/29/2023 30 (H) 8 - 23 mg/dL Final   09/28/2023 26 (H) 8 - 23 mg/dL Final      Creatinine Creatinine   Date Value Ref Range Status   09/29/2023 7.18 (H) 0.57 - 1.00 mg/dL Final   09/28/2023 6.85 (H) 0.57 - 1.00 mg/dL Final      Calcium Calcium   Date Value Ref Range Status   09/29/2023 8.2 (L) 8.6 - 10.5 mg/dL Final   09/28/2023 8.7 8.6 - 10.5 mg/dL Final      PO4 No results found for: CAPO4   Albumin Albumin   Date Value Ref Range Status   09/28/2023 2.7 (L) 3.5 - 5.2 g/dL Final      Magnesium Magnesium   Date Value Ref Range Status   09/28/2023 1.9 1.6 - 2.4 mg/dL Final      Uric Acid No results found for: URICACID        Results Review:     I reviewed the patient's new clinical results.    folic acid, 1,000 mcg, Oral, Daily  gabapentin, 300 mg, Oral, Nightly  insulin lispro, 2-7 Units, Subcutaneous, 4x Daily AC & at Bedtime  levothyroxine, 50 mcg, Oral, Q AM  Menthol-Zinc Oxide, 1 application , Topical, 4x Daily  senna-docusate sodium, 2 tablet, Oral, BID  sertraline, 150 mg, Oral, Daily  vitamin B-12, 1,000 mcg, Oral, Daily           Medication Review: Reviewed    Assessment & Plan     ESRD  Severe hypokalemia  N/V/D  Anemia of CKD    Plan: Continue MWF HD. K up to 3.6.   Agree with outpatient PO KCl as already ordered by Dr. Antonio.  OK for D/C from a renal standpoint.        Shine Rothman MD  [facility]  09/30/23  10:12 EDT

## 2023-09-30 NOTE — PLAN OF CARE
Goal Outcome Evaluation:                      Patient with discharge orders.  will transport patient home. Patient sitting up in chair at this time. No acute distress noted, will continue to monitor until discharge is complete.

## 2023-09-30 NOTE — THERAPY EVALUATION
Patient Name: Juana Hall  : 1960    MRN: [id number]                              Today's Date: 2023       Admit Date: 2023    Visit Dx:     ICD-10-CM ICD-9-CM   1. Vomiting and diarrhea  R11.10 787.03    R19.7 787.91   2. End stage renal disease on dialysis  N18.6 585.6    Z99.2 V45.11   3. Hypokalemia  E87.6 276.8   4. Malignant ascites  R18.0 789.51   5. Pancytopenia  D61.818 284.19   6. Primary malignant neoplasm of breast with metastasis  C50.919 174.9   7. Generalized weakness  R53.1 780.79     Patient Active Problem List   Diagnosis    Anxiety    Depression    Diabetes type 2, controlled    Hyperlipidemia    Heart murmur    Hypertension    Post-traumatic osteoarthritis of multiple joints    Psoriasis    Radiculopathy    Acquired hypothyroidism    Peripheral neuropathy    Normocytic anemia    History of right breast cancer    Omental mass    Primary malignant neoplasm of breast with metastasis    Elevated serum creatinine    Iron deficiency anemia    Anemia in stage 3 chronic kidney disease    Stage 3b chronic kidney disease    Anxiety and depression    RYAN (acute kidney injury)    Anemia, chronic disease    Diastolic CHF, chronic    Frequent UTI    Metabolic acidosis    Severe malnutrition    Hydronephrosis    Generalized weakness    COVID-19 virus detected    Hypocalcemia    Morbid obesity with BMI of 50.0-59.9, adult    ESRD (end stage renal disease)    Pancytopenia due to chemotherapy    Chronic anemia    Rectal bleeding    Bicuspid aortic valve    Complicated UTI (urinary tract infection)    Acute UTI (urinary tract infection)    Severe aortic stenosis    Combined systolic and diastolic congestive heart failure    Depression    ANDREW (obstructive sleep apnea)    Diastolic CHF, chronic    Adverse effect of chemotherapy    UTI (urinary tract infection)    Hematuria    Dysuria    Intractable vomiting     Past Medical History:   Diagnosis Date    Anemia     Anxiety and depression      Bicuspid aortic valve     had surgery    Breast cancer 2004    RIGHT, HAD NEELA. 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    History of sepsis 2010    KIDNEY STONE    History of transfusion     no reaction    Hyperlipidemia     Hyperthyroidism     Hypothyroidism     Kidney stone     CURRENT LEFT-DEC 2021    Lymphedema of leg     LEFT    Metastasis from breast cancer 2019    STOMACH-OMENTUM    Neuromuscular disorder     Obesity     ANDREW on CPAP     CPAP    Osteoarthrosis     Peripheral neuropathy     FEET BILAT    Radiculopathy     Rectal bleeding 08/16/2022    Thickened endometrium     Urinary incontinence     wears pads    Weakness     BILAT LEGS-WITH ANY AMT OF TIME     Past Surgical History:   Procedure Laterality Date    AORTIC VALVULOPLASTY  01/2023    ARTERIOVENOUS FISTULA/SHUNT SURGERY Left 11/03/2021    Procedure: LEFT  BRACHIOCEPALIC ARTERIOVENOUS FISTULA;  Surgeon: Dejuna Adler MD;  Location: [facility] MAIN OR;  Service: Vascular;  Laterality: Left;    BREAST RECONSTRUCTION  2004    WITH IMPLANTS, AND REVISION, NOW REMOVED    BREAST SURGERY Bilateral 2004    breast implants removed and then replaced due to infection    CARDIAC CATHETERIZATION N/A 08/23/2022    Procedure: CORONARY ANGIOGRAPHY;  Surgeon: Luis Rivers MD;  Location: [facility] CATH INVASIVE LOCATION;  Service: Cardiology;  Laterality: N/A;    CARDIAC CATHETERIZATION N/A 08/23/2022    Procedure: Right Heart  Cath;  Surgeon: Luis Rivers MD;  Location: [facility] CATH INVASIVE LOCATION;  Service: Cardiology;  Laterality: N/A;    CARDIAC CATHETERIZATION N/A 01/10/2023    Procedure: Valvuloplasty;  Surgeon: Luis Rivers MD;  [facility];  Service: Cardiovascular;  Laterality: N/A;  01/10/2023    CARDIAC CATHETERIZATION N/A 01/10/2023    Procedure: Aortic root aortogram;  Surgeon: Luis Rivers MD;  Location: [facility] CATH INVASIVE LOCATION;  Service: Cardiovascular;  Laterality: N/A;    CATARACT EXTRACTION WITH INTRAOCULAR LENS IMPLANT Bilateral      SECTION  1987     SECTION  1987    CYSTOSCOPY W/ URETERAL STENT PLACEMENT      CYSTOSCOPY W/ URETERAL STENT PLACEMENT Left 2021    Procedure: CYSTOSCOPY KEFT RETROGRADE PYELOGRAM  LEFT  URETERAL STENT PLACEMENT;  Surgeon: Leodan Mckee Jr., MD;  Location: [facility] OR;  Service: Urology;  Laterality: Left;    CYSTOSCOPY W/ URETERAL STENT PLACEMENT Left 03/10/2022    Procedure: CYSTOSCOPY AND LEFT URETERAL STENT EXCHANGE, RETROGRADE PYELOGRAM;  Surgeon: Leodan Mckee Jr., MD;  Location: [facility] MAIN OR;  Service: Urology;  Laterality: Left;    CYSTOSCOPY W/ URETERAL STENT PLACEMENT Left 2023    Procedure: CYSTOSCOPY WITH LEFT URETERAL STENT PLACEMENT, RETROGRADE PYELOGRAM, CLOT EVACUATION, BLADDER FULGARATION;  Surgeon: Leodan Mckee Jr., MD;  Location: [facility] OR;  Service: Urology;  Laterality: Left;    D & C HYSTEROSCOPY N/A 2017    Procedure: DILATATION AND CURETTAGE, HYSTEROSCOPY ;  Surgeon: Cherie Oliveros MD;  Location: [facility] OR [facility];  Service:     D & C HYSTEROSCOPY N/A 2019    Procedure: DILATATION AND CURETTAGE HYSTEROSCOPY/POLYPECTOMY;  Surgeon: Cherie Oliveros MD;  Location: [facility];  Service: Gynecology    D & C HYSTEROSCOPY ENDOMETRIAL ABLATION N/A 2023    Procedure: DILATATION AND CURETTAGE;  Surgeon: Ina Marcos  MD SCOT;  Location: [facility] OR;  Service: Obstetrics/Gynecology;  Laterality: N/A;    ENDOSCOPY      INSERTION AND REMOVAL HEMODIALYSIS CATHETER Right 05/01/2021    INSERTION HEMODIALYSIS CATHETER Right 05/01/2021    Procedure: HEMODIALYSIS CATHETER INSERTION;  Surgeon: Clint Hernández MD;  Location: [facility] MAIN OR;  Service: Vascular;  Laterality: Right;    LYMPH NODE BIOPSY      MASTECTOMY Bilateral 2004    VENOUS ACCESS DEVICE (PORT) INSERTION AND REMOVAL        General Information       Row Name 09/30/23 1217          Physical Therapy Time and Intention    Document Type evaluation  -     Mode of Treatment individual therapy;physical therapy  -       Row Name 09/30/23 1217          General Information    Prior Level of Function independent:;gait;transfer;bed mobility  walks with walker in home, uses electric scooter in community  -     Existing Precautions/Restrictions fall  -     Barriers to Rehab medically complex;previous functional deficit  -       Row Name 09/30/23 1217          Living Environment    People in Home spouse  -       Row Name 09/30/23 1217          Home Main Entrance    Number of Stairs, Main Entrance none  -       Row Name 09/30/23 1217          Stairs Within Home, Primary    Number of Stairs, Within Home, Primary none  -       Row Name 09/30/23 1217          Cognition    Orientation Status (Cognition) oriented x 4  -       Row Name 09/30/23 1217          Safety Issues, Functional Mobility    Impairments Affecting Function (Mobility) balance;endurance/activity tolerance;strength  -               User Key  (r) = Recorded By, (t) = Taken By, (c) = Cosigned By      Initials Name Provider Type     Rayna Dooley, PT Physical Therapist                   Mobility       Row Name 09/30/23 1219          Bed Mobility    Bed Mobility supine-sit;sit-supine  -     Supine-Sit [place] (Bed Mobility) verbal cues;nonverbal cues (demo/gesture);contact guard  -      Sit-Supine [place] (Bed Mobility) not tested  sitting in chair  -CH       Row Name 09/30/23 1219          Sit-Stand Transfer    Sit-Stand [place] (Transfers) verbal cues;nonverbal cues (demo/gesture);contact guard  -     Assistive Device (Sit-Stand Transfers) walker, front-wheeled  -CH       Row Name 09/30/23 1219          Gait/Stairs (Locomotion)    [place] Level (Gait) verbal cues;standby assist  -     Assistive Device (Gait) walker, front-wheeled  -     Distance in Feet (Gait) 20  -     Deviations/Abnormal Patterns (Gait) kelle decreased;gait speed decreased;stride length decreased  -     Bilateral Gait Deviations forward flexed posture  -               User Key  (r) = Recorded By, (t) = Taken By, (c) = Cosigned By      Initials Name Provider Type     Rayna Dooley, PT Physical Therapist                   Obj/Interventions       Row Name 09/30/23 1220          Range of Motion Comprehensive    General Range of Motion no range of motion deficits identified  -CH       Row Name 09/30/23 1220          Strength Comprehensive (MMT)    Comment, General Manual Muscle Testing (MMT) Assessment mild generalized weakness noted with functional mobiltiy  -CH       Row Name 09/30/23 1220          Balance    Balance Assessment standing static balance;standing dynamic balance  -     Static Standing Balance verbal cues;non-verbal cues (demo/gesture);contact guard  -     Dynamic Standing Balance verbal cues;non-verbal cues (demo/gesture);contact guard  -     Position/Device Used, Standing Balance walker, rolling  -               User Key  (r) = Recorded By, (t) = Taken By, (c) = Cosigned By      Initials Name Provider Type    Rayna Yancey PT Physical Therapist                   Goals/Plan       Row Name 09/30/23 1224          Bed Mobility Goal 1 (PT)    Activity/Assistive Device (Bed Mobility Goal 1, PT) bed mobility activities, all  -     [place] Level/Cues Needed (Bed  Mobility Goal 1, PT) supervision required  -CH     Time Frame (Bed Mobility Goal 1, PT) 1 week  -       Row Name 09/30/23 1224          Transfer Goal 1 (PT)    Activity/Assistive Device (Transfer Goal 1, PT) transfers, all;walker, rolling  -CH     [place] Level/Cues Needed (Transfer Goal 1, PT) supervision required  -CH     Time Frame (Transfer Goal 1, PT) 1 week  -       Row Name 09/30/23 1224          Gait Training Goal 1 (PT)    Activity/Assistive Device (Gait Training Goal 1, PT) gait (walking locomotion);walker, rolling  -CH     [place] Level (Gait Training Goal 1, PT) supervision required  -CH     Distance (Gait Training Goal 1, PT) 150  -CH     Time Frame (Gait Training Goal 1, PT) 1 week  -       Row Name 09/30/23 1224          Therapy Assessment/Plan (PT)    Planned Therapy Interventions (PT) balance training;bed mobility training;gait training;home exercise program;patient/family education;strengthening;transfer training  -               User Key  (r) = Recorded By, (t) = Taken By, (c) = Cosigned By      Initials Name Provider Type     Rayna Dooley, PT Physical Therapist                   Clinical Impression       Row Name 09/30/23 1221          Pain    Pretreatment Pain Rating 0/10 - no pain  -     Posttreatment Pain Rating 0/10 - no pain  -       Row Name 09/30/23 1221          Plan of Care Review    Plan of Care Reviewed With patient  -     Outcome Evaluation Pt is a 61 yo F who was admitted with nausea and vomitting. Pt presnts to PT with some impaired functional mobility and gait secondary to some generalized weakness and decreses activity tolerance. Pt may benefit from skilled PT to address strength, mobility, and gait. Pt reports she is not interested in HHPT at this time. Pt was able to ambulate around the room with SBA to CGA and a walker. Pt plans to return home today but if DC is delayed, PT will continue to follow in the hospital.  -       Row Name 09/30/23  1221          Therapy Assessment/Plan (PT)    Patient/Family Therapy Goals Statement (PT) to return home  -     Rehab Potential (PT) good, to achieve stated therapy goals  -     Criteria for Skilled Interventions Met (PT) skilled treatment is necessary  -     Therapy Frequency (PT) 5 times/wk  -       Row Name 09/30/23 1221          Positioning and Restraints    Pre-Treatment Position in bed  -     Post Treatment Position chair  -     In Chair reclined;call light within reach;encouraged to call for assist;exit alarm on;with family/caregiver  -               User Key  (r) = Recorded By, (t) = Taken By, (c) = Cosigned By      Initials Name Provider Type     Rayna Dooley, PT Physical Therapist                   Outcome Measures       Row Name 09/30/23 1225          How much help from another person do you currently need...    Turning from your back to your side while in flat bed without using bedrails? 3  -CH     Moving from lying on back to sitting on the side of a flat bed without bedrails? 3  -CH     Moving to and from a bed to a chair (including a wheelchair)? 3  -CH     Standing up from a chair using your arms (e.g., wheelchair, bedside chair)? 3  -CH     Climbing 3-5 steps with a railing? 2  -CH     To walk in hospital room? 3  -CH     AM-PAC 6 Clicks Score (PT) 17  -CH     Highest level of mobility 5 --> Static standing  -       Row Name 09/30/23 1225          Functional Assessment    Outcome Measure Options AM-PAC 6 Clicks Basic Mobility (PT)  -               User Key  (r) = Recorded By, (t) = Taken By, (c) = Cosigned By      Initials Name Provider Type     Rayna Dooley, PT Physical Therapist                                 Physical Therapy Education       Title: PT OT SLP Therapies (Done)       Topic: Physical Therapy (Done)       Point: Mobility training (Done)       Learning Progress Summary             Patient Acceptance, E,TB,D, VU,NR by  at 9/30/2023 6705                          Point: Home exercise program (Done)       Learning Progress Summary             Patient Acceptance, E,TB,D, VU,NR by  at 9/30/2023 1225                         Point: Body mechanics (Done)       Learning Progress Summary             Patient Acceptance, E,TB,D, VU,NR by  at 9/30/2023 1225                         Point: Precautions (Done)       Learning Progress Summary             Patient Acceptance, E,TB,D, VU,NR by  at 9/30/2023 1225                                         User Key       Initials Effective Dates Name Provider Type Discipline     06/16/21 -  Rayna Dooley, PT Physical Therapist PT                  PT Recommendation and Plan  Planned Therapy Interventions (PT): balance training, bed mobility training, gait training, home exercise program, patient/family education, strengthening, transfer training  Plan of Care Reviewed With: patient  Outcome Evaluation: Pt is a 61 yo F who was admitted with nausea and vomitting. Pt presnts to PT with some impaired functional mobility and gait secondary to some generalized weakness and decreses activity tolerance. Pt may benefit from skilled PT to address strength, mobility, and gait. Pt reports she is not interested in HHPT at this time. Pt was able to ambulate around the room with SBA to CGA and a walker. Pt plans to return home today but if DC is delayed, PT will continue to follow in the hospital.     Time Calculation:         PT Charges       Row Name 09/30/23 1225             Time Calculation    Start Time 0955  -      Stop Time 1010  -      Time Calculation (min) 15 min  -      PT Received On 09/30/23  -      PT - Next Appointment 10/02/23  -      PT Goal Re-Cert Due Date 10/07/23  -         Time Calculation- PT    Total Timed Code Minutes- PT 10 minute(s)  -         Timed Charges    60763 - PT Therapeutic Activity Minutes 10  -CH         Total Minutes    Timed Charges Total Minutes 10  -CH       Total Minutes 10  -CH                 User Key  (r) = Recorded By, (t) = Taken By, (c) = Cosigned By      Initials Name Provider Type     Rayna Dooley, PT Physical Therapist                  Therapy Charges for Today       Code Description Service Date Service Provider Modifiers Qty    66517040871 HC PT THERAPEUTIC ACT EA 15 MIN 9/30/2023 Rayna Dooley, PT GP 1    00756799123 HC PT EVAL MOD COMPLEXITY 2 9/30/2023 Rayna Dooley, PT GP 1            PT G-Codes  Outcome Measure Options: AM-PAC 6 Clicks Basic Mobility (PT)  AM-PAC 6 Clicks Score (PT): 17  PT Discharge Summary  Anticipated Discharge Disposition (PT): home with assist    Rayna Dooley, [place]  9/30/2023

## 2023-09-30 NOTE — NURSING NOTE
Dialysis completed.  No net uf as ordered.  1 unit packed red blood cells transfused on dialysis.  Tolerated well.      126/58.  Pulse 70

## 2023-09-30 NOTE — OUTREACH NOTE
Prep Survey      Flowsheet Row Responses   [facility] [place]   Is LACE score < 7 ? No   Eligibility [facility]   Date of Admission 09/28/23   Date of Discharge 09/30/23   Discharge Disposition Home or Self Care   Discharge diagnosis Intractable vomiting   Does the patient have one of the following disease processes/diagnoses(primary or secondary)? Other   Does the patient have Home health ordered? No   Is there a DME ordered? No   Prep survey completed? Yes            Gloria OWENS - Registered Nurse

## 2023-09-30 NOTE — PLAN OF CARE
Goal Outcome Evaluation:  Plan of Care Reviewed With: patient        Progress: no change  Outcome Evaluation: Patient   resting  in  bed.  Hemodialysis  completed.  Per  RN  Hemodialysis  with  no  fluid  taken  off.  Pateint  complining of  intcatable  nausea  and vomited  250ml  brown  emesis.   Zofran  and  Compazine  PRN  given  as  needed.   Still  has  diarrhea.   SEnokot  held.  CPAP  at night.  Now  SR  on  the monitor.   Nursing  will  continue to monitor

## 2023-09-30 NOTE — PLAN OF CARE
Goal Outcome Evaluation:  Plan of Care Reviewed With: patient           Outcome Evaluation: Pt is a 61 yo F who was admitted with nausea and vomitting. Pt presnts to PT with some impaired functional mobility and gait secondary to some generalized weakness and decreses activity tolerance. Pt may benefit from skilled PT to address strength, mobility, and gait. Pt reports she is not interested in HHPT at this time. Pt was able to ambulate around the room with SBA to CGA and a walker. Pt plans to return home today but if DC is delayed, PT will continue to follow in the hospital.      Anticipated Discharge Disposition (PT): home with assist

## 2023-09-30 NOTE — H&P (VIEW-ONLY)
[facility] GROUP INPATIENT PROGRESS NOTE    Length of Stay:  2 days    CHIEF COMPLAINT/REASON FOR VISIT:  Metastatic lobular breast cancer (ER/NC positive, HER-2/tj negative), anemia secondary to CKD3, CHF, peripheral neuropathy, chemotherapy related nausea chemotherapy-induced myelosuppression      SUBJECTIVE:  She has still had some nausea but is keeping liquids down right now. She has not had the paracentesis but is being discharged in spite of this. She still plans to travel to [place] this week.      ROS:  14 systems reviewed with pertinent positives and negatives in the HPI. Reviewed today.    OBJECTIVE:  Vitals:    09/29/23 2336 09/30/23 0324 09/30/23 0339 09/30/23 0701   BP: 117/49 128/51  116/59   BP Location: Right arm Right arm  Right arm   Patient Position: Lying Lying  Lying   Pulse: 79 79  79   Resp: 18 18  18   Temp: 97.9 øF (36.6 øC) 97.5 øF (36.4 øC)  98.4 øF (36.9 øC)   TempSrc: Oral Oral  Oral   SpO2: 97% 97%  95%   Weight:   (!) 171 kg (376 lb 1.7 oz)    Height:             PHYSICAL EXAMINATION:   General:  No acute distress, awake, alert and oriented. In chair.   Skin:  Warm and dry, no visible rash  HEENT:  Normocephalic/atraumatic.   Chest:  Normal respiratory effort  Abdomen: distended, obest  Extremities:  No visible clubbing, cyanosis, or edema  Neuro/psych:  Grossly nonfocal.  Normal mood and affect.       DIAGNOSTIC DATA:  Results Review:     I reviewed the patient's new clinical results.    Results from last 7 days   Lab Units 09/30/23  0711   WBC 10*3/mm3 3.30*   HEMOGLOBIN g/dL 7.8*   HEMATOCRIT % 24.0*   PLATELETS 10*3/mm3 95*     Lab Results   Component Value Date    NEUTROABS 2.17 09/28/2023     Results from last 7 days   Lab Units 09/29/23  0645   SODIUM mmol/L 135*   POTASSIUM mmol/L 2.7*   CHLORIDE mmol/L 97*   CO2 mmol/L 25.0   BUN mg/dL 30*   CREATININE mg/dL 7.18*   GLUCOSE mg/dL 148*   CALCIUM mg/dL 8.2*     Results from last 7 days   Lab Units 09/28/23  1341   INR   1.29*     Results from last 7 days   Lab Units 09/28/23  1711   MAGNESIUM mg/dL 1.9         IMAGING:    None reviewed    ASSESSMENT:  This is a 62 y.o. female with:     *Metastatic lobular breast cancer (ER/FL positive, HER-2/tj negative):  Previous stage IIIC right breast cancer in 2003, received neoadjuvant chemotherapy (unknown regimen) with subsequent bilateral mastectomies 1/22/2004 with right axillary dissection.  Residual 10-12 cm invasive lobular carcinoma on the right breast with 14/16+ nodes with extranodal extension.  Received adjuvant carboplatin and Navelbine chemotherapy x4 cycles  Attempted adjuvant radiation to the chest wall however interrupted due to cellulitis that required removal of implants in August 2004  Received tamoxifen from 6/22/2004 until June 2009.  Transitioned to Femara and received until June 2014  Identification of CT findings concerning for carcinomatosis on CT scans and June 2019.  PET scan confirmed hypermetabolic activity in the omentum as well as small retroperitoneal lymph nodes.  CT-guided omental biopsy 7/30/2019 with metastatic lobular carcinoma of breast primary, ER positive (greater than 95%), FL positive (90%), HER-2/tj negative (1+ IHC)  Foundation medicine liquid biopsy 2/5/2020: MSI undetermined, mutations in BETH, NF1, CHEK2.  No evidence of PIK3CA mutation.  Subsequent [facility] germline testing 11/12/2021 with BETH VUS (c.2864C>A) and POLE VUS (c.631A>T)  Initiation of Aromasin and Ibrance in August 2019  Difficulty with myelosuppression related to Ibrance requiring dose alterations, eventually changed to 100 mg for 7 days on followed by 7 days off.  CT chest abdomen pelvis 10/26/2021 with increase in left hydronephrosis with increase in left perinephric and periureteral stranding. Decrease in stone in the left kidney lower pole (7 mm).  Stable small retroperitoneal lymph and left periaortic lymph nodes.  PET scan 11/10/2021 with multiple bilateral hypermetabolic  nodular pulmonary lesions, asymmetric moderate to severe left hydronephrosis with ill-defined fat stranding and soft tissue thickening in the renal sinus and surrounding the left ureter, hypermetabolic left retroperitoneal lymph node with slight increase in size, ill-defined hypermetabolic thickening in the inferior omentum with increase in size, uptake and C7 (indeterminate, recommended MRI).  Short segments of uptake in the mid and distal esophagus possibly related to gastritis.  PET scan findings felt to be consistent with progressive malignancy.  Patient declined repeat omental biopsy.   Options for further treatment discussed on 11/12/2021.  In light of renal failure and dialysis, options are more limited.  Recommendation to continue Ibrance and discontinue Aromasin, begin Faslodex.  Discussed possible future use of single agent Taxol.    Aromasin discontinued 11/12/2021  On 11/22/2021 initiation of Faslodex with continuation of Ibrance (100 mg daily for 7 days on followed by 7 days off).  MRI cervical spine 11/18/2021 showed the right C7 normality to likely represent metastatic disease.  With solitary bone lesion, did not recommend pursuit of bone modifying agent.  Following 2 cycles Faslodex/Ibrance, PET scan on 1/11/2022 showed no change in the soft tissue superior to the dome of the bladder with hypermetabolic activity (likely related to carcinomatosis).  Significant decrease in injection of fat around the left renal pelvis and stable retroperitoneal hypermetabolic lymph nodes, decrease in size and activity in small bilateral pulmonary nodules.  Findings felt to represent evidence of response to treatment.    Ibrance held briefly beginning 1/28/2022 due to hospitalization with COVID-19 infection and C. difficile colitis.  Patient developed leukopenia/neutropenia.  Ibrance resumed at previous dosing (100 mg daily 7 days on followed by 7 days off) on 2/9/22.  Following 5 cycles Faslodex/Ibrance PET scan  4/19/2022 with evidence of mixed response.  New hypermetabolic lesion spinous process L2 (SUV 5.1) and slight increase in activity left clavicular metastasis (SUV increased 4.6-8.1).  C7 hypermetabolic mixed lytic and blastic bone lesion was unchanged.  Decrease in uptake involving omental thickening.  Stable soft tissue thickening around the left renal pelvis and ureter.  Discussion again regarding potential pursuit of IV chemotherapy with Taxol versus continuation of Ibrance and Faslodex with radiation to sites of bony disease at L2, left clavicle, C7.  Patient opted to defer initiation of systemic chemotherapy and continue Ibrance/Faslodex with consideration of radiation.  Initiated monthly Xgeva on 5/19/2022 (cleared with nephrology).  Xgeva subsequently held due to significant hypocalcemia.  Decision made to forego further Xgeva with persistent hypocalcemia.  Palliative radiation received to lumbar spine regarding L2 metastasis 5/23- 6/7/2022  Ibrance held during radiation therapy beginning 5/22/2022.  Ibrance resumed 6/16/2022.  PET scan 7/21/2022 with stable hypermetabolic abdominal pelvic lymph nodes and subcarinal lymph node, stable bone lesions at C7, left clavicle.  Stable soft tissue thickening around the left renal pelvis with left hydronephrosis.  Uptake in adrenal glands felt to be likely reactive (no change in size).  No activity noted at L2 (previously radiated).  New hypermetabolic lesion right anterior sixth rib.  With evidence of further slight progression in bone on PET scan (new right sixth rib lesion), on 7/28/2022 discontinued Ibrance and plan to transition to abemaciclib with continuation of Faslodex.    On 8/4/2022 initiated abemaciclib at reduced dose of 50 mg twice daily.  On 8/25/2022, increased abemaciclib dose to 100 mg twice daily  PET scan 10/6/2022 with stable findings with exception of left acetabular activity report noted new activity however on prior PET scan question of focal  activity present previously.  No corresponding CT abnormality and significant increase in SUV at sacral lesion.  Scan otherwise stable.  Decision to continue current treatment  Abemaciclib dose increased on 10/13/2022 up to 150 mg twice daily  PET scan 12/29/2022 with artifactual accumulation of tracer right axilla and right mastectomy bed without visualized CT abnormality.  Hypermetabolic bone lesions with decrease in level of activity.  No new findings.  PET scan 3/30/2023 with minimal/insignificant increase in activity in multiple bone lesions.  1 new focus of hypermetabolic activity left anterior acetabulum (SUV 10.7) however previously identified activity anterolateral left acetabulum is no longer present.  Subcutaneous fluid collection lateral right anterior chest wall nearly completely resolved.  Due to continued clinical benefit from Faslodex/abemaciclib and with only 1 potential new finding in left acetabulum on PET scan, elected to continue current treatment at visit on 4/13/2023 and pursue MRI pelvis to evaluate left acetabulum with potential pursuit of radiation to solitary new left acetabular lesion.  Abemaciclib held 4/13/2023 due to neutropenia (ANC 0.95) and thrombocytopenia (platelet count 93,000).  On 4/27/2023 resumed abemaciclib 150 mg in the morning and subsequently added 100 mg dose in the evening on 5/6/2023.    Patient developed significant hematuria which had originally been interpreted as vaginal bleeding.  She then experienced migration and loss of her left ureteral stent spontaneously.  On 5/5/2023 she underwent replacement of left ureteral stent as well as cauterization of a bleeding site in the bladder.  She underwent at the same setting D&C with biopsy that showed no evidence of endometrium, minute fragment of benign endocervical mucosa.  Complete resolution of the hematuria.   MRI pelvis 5/8/2023 showed multiple areas of metastatic involvement in the pelvis, largest lesion 2.7 cm  involving the anterior left acetabulum (corresponding to the new PET positive lesion).  No prior comparison.  Additional subtle 1 cm left iliac wing, 1.9 cm S2, 1.7 cm right iliac crest, 1.4 cm left pubic ramus, and 2.7 cm posterior right acetabular lesions noted.  There were several other subcentimeter foci of abnormal marrow signal in the sacrum and right iliac bone of unclear significance.  Patient received palliative radiation to the new/progressive lesion in the left anterior acetabulum, received 20 Gray in 5 fractions completed on 5/22/2023.  Due to continued myelosuppression, then the cycle of dose decreased further from 150 mg a.m. and 100 mg p.m. down to 100 mg twice daily on 6/1/2023.  PET scan 7/3/2023 with stable bone lesions with minor variations in degree of hypermetabolic activity likely clinically insignificant.  Increase in moderate ascites with some associated hypermetabolic activity in the anterior pelvis.  Focal 1 cm hypermetabolic soft tissue nodule in the right mesentery with SUV 5.2.  Patient last receiving cycle 23 Faslodex on 9/6/2023 along with continuing abemaciclib 100 mg twice daily.  She is scheduled for repeat PET scan 10/9/2023 after she returns from vacation.  CT abdomen/pelvis 9/29/23: Large amount of ascites, new. Adenopathy improved. No other findings.      *Anemia secondary to ESRD, underlying malignancy/chronic disease, myelosuppression from CDK 4/6 inhibitor, GI blood loss:  Anemia secondary to ESRD, malignancy with exacerbation by use of medication  H/o folate deficiency and iron deficiency  Following initiation of hemodialysis, management of BEAU transitioned to nephrology. On Micera  Transfusing prn  Stool negative for occult blood x3 on 11/2/2021  Patient did develop transient visible blood in stool in July 2022  Stool positive for occult blood x3 on 8/1/2022  Patient was seen by GI on 8/16/2022, felt to be high risk for endoscopic evaluation and elected to forego  EGD/colonoscopy for now  Patient with significant gross hematuria with left ureteral stent migration and eventual loss.  Anemia worsened during this timeframe requiring transfusion support on 4/20/2023 with hemoglobin 6.1 and 5/3/2023 with hemoglobin 6.3.  We typically only transfuse her for hemoglobin less than 7 as her hemoglobin is usually in the 7-8 range.  She does continue every 4-week Mircera with nephrology.  Hemoglobin 7.8, from 6.8 after transfusion on HD, from 7.4, from 8.1     *ESRD on HD:  Patient with previous CKD3/4  Hospitalization 4/29-5/4/2021 with RYAN/CKD, UTI, anemia.  Required initiation of hemodialysis Tuesday Thursday Saturday.   Decrease in frequency of dialysis to twice per week.  She continues to produce a significant amount of urine.   Status post left upper extremity AV fistula placement on 11/3/2021 by vascular surgery  Attempt to hold further dialysis on 1/11/2022 with close monitoring of her laboratory and clinical parameters.  Dialysis quickly resumed due to development of hyperkalemia.  Patient was undergoing dialysis 2 days/week on Mondays and Fridays.    On 8/22/2022 patient transitioned to use of home dialysis device 5 days/week x 2 hours.     *CHF with severe aortic stenosis:  Echocardiogram 7/12/2019 showing ejection fraction 48% with moderate dilation of the LV cavity, global hypokinesis, grade 2 diastolic dysfunction, mild to moderate aortic stenosis, trivial pericardial effusion.  Echocardiogram 7/19/2021 with ejection fraction 56%, left atrial volume moderately increased, grade 2 diastolic dysfunction, severe calcification aortic valve, moderate aortic stenosis, severe mitral calcification, mild mitral stenosis, marked elevation in RVSP from tricuspid regurgitation.  Echocardiogram on 7/13/2022 with ejection fraction 56-60%, grade 2 diastolic dysfunction, severe aortic stenosis.    Cardiac catheterization 8/23/2022 showed normal coronary arteries, mild to moderate pulmonary  hypertension.    She was evaluated by cardiothoracic surgery Dr. Pagan and there was discussion regarding potential candidacy for TAVR although there was concern given her underlying comorbidities including her metastatic breast cancer.  I discussed patient's prognosis with Dr. Pagan.  She does have opportunities for additional treatment of her malignancy with intravenous chemotherapy and is willing to pursue this in the future when needed.  It is unclear as to her expected survival however given her multiple severe comorbidities with metastatic breast cancer, ESRD on dialysis, severe aortic stenosis, severe anemia.  There is consideration of possible pursuit of balloon valvuloplasty initially to assess her symptomatic response and then consider pursuit of TAVR in the future.   Patient did undergo aortic valvuloplasty during admission 1/10 - 1/11/2023.  Cardiology evaluated here     *Left upper and bilateral lower extremity peripheral neuropathy:  Patient reports that left upper extremity neuropathy was not present from previous chemotherapy but occurred after hospitalization that required intubation and critical care stay.  Apparently felt to represent critical care neuropathy.  Improved over time.  Bilateral lower extremity neuropathy felt to be related to diabetes  May have future implications regarding treatment for breast cancer.  Patient reports that peripheral neuropathy symptoms continue in the bilateral lower extremities, unchanged.  This will be a consideration with potential future use of Taxol     *Uterine/endometrial activity on PET scan:  Patient experienced postmenopausal vaginal bleeding in 2013, negative endometrial biopsy and gynecologic evaluation.  With findings on PET scan with enlarging uterus and some hypermetabolic activity, referred back to Dr. Oliveros in gynecology.    9/19/2019 D&C with hysteroscopy by Dr. Oliveros, no significant intraoperative findings, pathologic results with benign  findings, no evidence of malignancy.  Patient with question of vaginal bleeding however subsequently became evident that this was hematuria.  She was referred to gynecology, did undergo on 5/5/2023 D&C at the same time as cystoscopy, biopsy with no evidence of malignancy but no endometrium present.     *Nausea:  Mild, relieved with Zofran.   Patient experienced improvement in nausea after initiating hemodialysis  9/26/2023 patient again is reporting today intermittent episodes of vomiting large amounts of bile beginning over the last 3 days.  See further discussion below.     *Myelosuppression secondary to CDK 4/6 inhibitor:  Patient was able to maintain adequate WBC after dosing alteration on Ibrance to treatment at 100 mg daily for 7 days on followed by 7 days off.  Subsequent transition from Ibrance to abemaciclib  Overall counts improved on abemaciclib  On 6/1/2023, further decrease in abemaciclib dose to 100 mg twice daily due to neutropenia and thrombocytopenia.  Counts dropping     *Depression  Patient with history of depression, worsened after the death of her son in November 2021  With evidence of progressive malignancy in November 2021, patient agreeable to referral to supportive oncology  Patient currently receiving gabapentin 300 mg nightly, Zoloft 150 mg daily  Patient does continue with difficulties regarding depression that wax and wane.  Currently symptoms under fair control.  Patient was previously seeing supportive oncology.  She does not feel that she needs further follow-up at this time however would not rule out returning to see supportive oncology in the future if needed.  We will prescribe her Zoloft at this point.  She remains off of armodafinil.     *Left perinephric stranding secondary to malignancy with hydronephrosis:  CT 4/22/2021 showed interval enlargement of 2 staghorn calculi in the left kidney with persistent left perinephric stranding  Hospitalization 4/29-5/4/2021 with RYAN/CKD,  UTI, anemia.  Required 2 unit PRBC transfusion and initiated hemodialysis Tuesday Thursday Saturday.    Discussion from urology regarding potential need for surgical procedure to address staghorn calculus left kidney  CT scan 7/2/2021 with only 1 remaining left renal stone identified which had decreased in size.  Patient appears to have passed the other stone.  As above, CT 10/26/2021 with more prominent left hydronephrosis and increase in left perinephric and periureteral stranding.  Felt to possibly be related to passage of recent stone versus partial obstruction secondary to debris or thrombus in the left ureter versus pyelonephritis.  Patient however with no clinical evidence of recent stone passage nor pyelonephritis. Malignancy felt to be the cause of CT changes around the left kidney at this point.   Left ureteral stent replacement 12/16/2021  PET scan 1/11/2022 with decrease in fat injection near left renal pelvis, stent in satisfactory position.  Mild residual hydronephrosis on the left.  Ureteral stent replaced on 3/10/2022  PET scan 4/19/2022 with no hydronephrosis, left ureteral stent in place  PET scan 7/21/2022 with persistent left hydronephrosis, ureteral stent in place  PET scan 10/6/2022 with left nephroureteral stent in place, overall stable findings  PET scan 12/29/2022 with left nephroureteral stent remaining in place, stable findings  PET scan 3/30/2023 with left ureteral stent in place.  Patient developed gross hematuria with migration and spontaneous loss of left ureteral stent.  Patient underwent stent replacement on 5/5/2023.  Cauterization of bleeding focus in the bladder.  Resolution of hematuria.  Patient did see urology 8/21/2023, plan further follow-up at 3-month interval.     *Right thyroid nodules  Patient underwent prior thyroid biopsy by her report with benign findings many years ago, records not available  On MRI cervical spine 11/18/2021, finding of 1.8 cm right thyroid  nodule  Thyroid ultrasound 11/26/2021 with right-sided thyroid nodules, 1 nodule was hypoechoic, solid measuring 2 cm with biopsy recommended.  Thyroid ultrasound results reviewed with patient.  In light of her metastatic breast cancer, renal failure the significance of the thyroid nodule is felt to be much less.  We discussed possibility of thyroid biopsy however patient is inclined to forego this, especially since she has had a biopsy performed in the past with benign findings by her report.    No hypermetabolic activity identified on PET scan 1/11/2022 in the neck     *Hospitalization 1/28-1/30/2022 due to COVID-19 infection and C. difficile colitis  Patient fully vaccinated with 3 doses Moderna COVID-19 vaccine  Patient developed symptoms 1/26/2022 with abrupt onset sinus congestion, mild cough, subsequently developed nausea and diarrhea on 1/27/2022.  Significant fatigue.  Patient tested positive in emergency department on 1/28/2022 and was admitted  Patient maintained O2 saturation in mid 90% range on room air  Patient received remdesivir  Patient was also found to be positive for C. difficile colitis, received course of oral vancomycin.  Symptoms from both COVID-19 and C. difficile colitis ultimately resolved.     *Hypocalcemia  Patient developed significant hypocalcemia after receiving Xgeva on 5/19/2022  Calcium management per nephrology  No further Xgeva planned due to persistent significant hypocalcemia  Ca normal     *Diarrhea  Patient with history of C. difficile infection in January 2022  Patient with intermittent diarrhea since around June 2023.  Question whether ongoing intermittent diarrhea is related to abemaciclib   Stool studies on 7/31/2023 with negative GI PCR however C. difficile testing showed negative C. difficile toxin but positive C. difficile PCR.  Patient with ongoing diarrhea.  Received treatment with vancomycin p.o. 125 mg 4 times daily x10 days beginning 8/9/2023.  Patient experienced  complete resolution of diarrhea  Stool with positive C dif PCR but negative ag  ID following. No tx at this time     *Hypokalemia  K normal after HD     PLAN:  Continue to Hold Verzenio for now and following discharge  Paracentesis will not be done prior to discharge and will need to be done outpatient. She very much wants to be discharged rather than wait for the procedure to be done which might not be done until Monday if she were to remain inpatient.  Continue home HD 5 days/week  Patient is receiving Mircera under the direction of nephrology, dosing every 4 weeks as well as IV iron intermittently as needed.  Planning discharge today so she can travel to [place]. Hopefully her symptoms do not recur but I advised her to notify is if they do.  PET scheduled on 10/9 and follow up with Dr. Irvin following that on 10/18    Discussed with her and her     Tai Nieves MD

## 2023-09-30 NOTE — PROGRESS NOTES
[facility] GROUP INPATIENT PROGRESS NOTE    Length of Stay:  2 days    CHIEF COMPLAINT/REASON FOR VISIT:  Metastatic lobular breast cancer (ER/MS positive, HER-2/tj negative), anemia secondary to CKD3, CHF, peripheral neuropathy, chemotherapy related nausea chemotherapy-induced myelosuppression      SUBJECTIVE:  She has still had some nausea but is keeping liquids down right now. She has not had the paracentesis but is being discharged in spite of this. She still plans to travel to [place] this week.      ROS:  14 systems reviewed with pertinent positives and negatives in the HPI. Reviewed today.    OBJECTIVE:  Vitals:    09/29/23 2336 09/30/23 0324 09/30/23 0339 09/30/23 0701   BP: 117/49 128/51  116/59   BP Location: Right arm Right arm  Right arm   Patient Position: Lying Lying  Lying   Pulse: 79 79  79   Resp: 18 18  18   Temp: 97.9 °F (36.6 °C) 97.5 °F (36.4 °C)  98.4 °F (36.9 °C)   TempSrc: Oral Oral  Oral   SpO2: 97% 97%  95%   Weight:   (!) 171 kg (376 lb 1.7 oz)    Height:             PHYSICAL EXAMINATION:   General:  No acute distress, awake, alert and oriented. In chair.   Skin:  Warm and dry, no visible rash  HEENT:  Normocephalic/atraumatic.   Chest:  Normal respiratory effort  Abdomen: distended, obest  Extremities:  No visible clubbing, cyanosis, or edema  Neuro/psych:  Grossly nonfocal.  Normal mood and affect.       DIAGNOSTIC DATA:  Results Review:     I reviewed the patient's new clinical results.    Results from last 7 days   Lab Units 09/30/23  0711   WBC 10*3/mm3 3.30*   HEMOGLOBIN g/dL 7.8*   HEMATOCRIT % 24.0*   PLATELETS 10*3/mm3 95*     Lab Results   Component Value Date    NEUTROABS 2.17 09/28/2023     Results from last 7 days   Lab Units 09/29/23  0645   SODIUM mmol/L 135*   POTASSIUM mmol/L 2.7*   CHLORIDE mmol/L 97*   CO2 mmol/L 25.0   BUN mg/dL 30*   CREATININE mg/dL 7.18*   GLUCOSE mg/dL 148*   CALCIUM mg/dL 8.2*     Results from last 7 days   Lab Units 09/28/23  1341   INR   1.29*     Results from last 7 days   Lab Units 09/28/23  1711   MAGNESIUM mg/dL 1.9         IMAGING:    None reviewed    ASSESSMENT:  This is a 62 y.o. female with:     *Metastatic lobular breast cancer (ER/IA positive, HER-2/tj negative):  Previous stage IIIC right breast cancer in 2003, received neoadjuvant chemotherapy (unknown regimen) with subsequent bilateral mastectomies 1/22/2004 with right axillary dissection.  Residual 10-12 cm invasive lobular carcinoma on the right breast with 14/16+ nodes with extranodal extension.  Received adjuvant carboplatin and Navelbine chemotherapy x4 cycles  Attempted adjuvant radiation to the chest wall however interrupted due to cellulitis that required removal of implants in August 2004  Received tamoxifen from 6/22/2004 until June 2009.  Transitioned to Femara and received until June 2014  Identification of CT findings concerning for carcinomatosis on CT scans and June 2019.  PET scan confirmed hypermetabolic activity in the omentum as well as small retroperitoneal lymph nodes.  CT-guided omental biopsy 7/30/2019 with metastatic lobular carcinoma of breast primary, ER positive (greater than 95%), IA positive (90%), HER-2/tj negative (1+ IHC)  Foundation medicine liquid biopsy 2/5/2020: MSI undetermined, mutations in BETH, NF1, CHEK2.  No evidence of PIK3CA mutation.  Subsequent [facility] germline testing 11/12/2021 with BETH VUS (c.2864C>A) and POLE VUS (c.631A>T)  Initiation of Aromasin and Ibrance in August 2019  Difficulty with myelosuppression related to Ibrance requiring dose alterations, eventually changed to 100 mg for 7 days on followed by 7 days off.  CT chest abdomen pelvis 10/26/2021 with increase in left hydronephrosis with increase in left perinephric and periureteral stranding. Decrease in stone in the left kidney lower pole (7 mm).  Stable small retroperitoneal lymph and left periaortic lymph nodes.  PET scan 11/10/2021 with multiple bilateral hypermetabolic  nodular pulmonary lesions, asymmetric moderate to severe left hydronephrosis with ill-defined fat stranding and soft tissue thickening in the renal sinus and surrounding the left ureter, hypermetabolic left retroperitoneal lymph node with slight increase in size, ill-defined hypermetabolic thickening in the inferior omentum with increase in size, uptake and C7 (indeterminate, recommended MRI).  Short segments of uptake in the mid and distal esophagus possibly related to gastritis.  PET scan findings felt to be consistent with progressive malignancy.  Patient declined repeat omental biopsy.   Options for further treatment discussed on 11/12/2021.  In light of renal failure and dialysis, options are more limited.  Recommendation to continue Ibrance and discontinue Aromasin, begin Faslodex.  Discussed possible future use of single agent Taxol.    Aromasin discontinued 11/12/2021  On 11/22/2021 initiation of Faslodex with continuation of Ibrance (100 mg daily for 7 days on followed by 7 days off).  MRI cervical spine 11/18/2021 showed the right C7 normality to likely represent metastatic disease.  With solitary bone lesion, did not recommend pursuit of bone modifying agent.  Following 2 cycles Faslodex/Ibrance, PET scan on 1/11/2022 showed no change in the soft tissue superior to the dome of the bladder with hypermetabolic activity (likely related to carcinomatosis).  Significant decrease in injection of fat around the left renal pelvis and stable retroperitoneal hypermetabolic lymph nodes, decrease in size and activity in small bilateral pulmonary nodules.  Findings felt to represent evidence of response to treatment.    Ibrance held briefly beginning 1/28/2022 due to hospitalization with COVID-19 infection and C. difficile colitis.  Patient developed leukopenia/neutropenia.  Ibrance resumed at previous dosing (100 mg daily 7 days on followed by 7 days off) on 2/9/22.  Following 5 cycles Faslodex/Ibrance PET scan  4/19/2022 with evidence of mixed response.  New hypermetabolic lesion spinous process L2 (SUV 5.1) and slight increase in activity left clavicular metastasis (SUV increased 4.6-8.1).  C7 hypermetabolic mixed lytic and blastic bone lesion was unchanged.  Decrease in uptake involving omental thickening.  Stable soft tissue thickening around the left renal pelvis and ureter.  Discussion again regarding potential pursuit of IV chemotherapy with Taxol versus continuation of Ibrance and Faslodex with radiation to sites of bony disease at L2, left clavicle, C7.  Patient opted to defer initiation of systemic chemotherapy and continue Ibrance/Faslodex with consideration of radiation.  Initiated monthly Xgeva on 5/19/2022 (cleared with nephrology).  Xgeva subsequently held due to significant hypocalcemia.  Decision made to forego further Xgeva with persistent hypocalcemia.  Palliative radiation received to lumbar spine regarding L2 metastasis 5/23- 6/7/2022  Ibrance held during radiation therapy beginning 5/22/2022.  Ibrance resumed 6/16/2022.  PET scan 7/21/2022 with stable hypermetabolic abdominal pelvic lymph nodes and subcarinal lymph node, stable bone lesions at C7, left clavicle.  Stable soft tissue thickening around the left renal pelvis with left hydronephrosis.  Uptake in adrenal glands felt to be likely reactive (no change in size).  No activity noted at L2 (previously radiated).  New hypermetabolic lesion right anterior sixth rib.  With evidence of further slight progression in bone on PET scan (new right sixth rib lesion), on 7/28/2022 discontinued Ibrance and plan to transition to abemaciclib with continuation of Faslodex.    On 8/4/2022 initiated abemaciclib at reduced dose of 50 mg twice daily.  On 8/25/2022, increased abemaciclib dose to 100 mg twice daily  PET scan 10/6/2022 with stable findings with exception of left acetabular activity report noted new activity however on prior PET scan question of focal  activity present previously.  No corresponding CT abnormality and significant increase in SUV at sacral lesion.  Scan otherwise stable.  Decision to continue current treatment  Abemaciclib dose increased on 10/13/2022 up to 150 mg twice daily  PET scan 12/29/2022 with artifactual accumulation of tracer right axilla and right mastectomy bed without visualized CT abnormality.  Hypermetabolic bone lesions with decrease in level of activity.  No new findings.  PET scan 3/30/2023 with minimal/insignificant increase in activity in multiple bone lesions.  1 new focus of hypermetabolic activity left anterior acetabulum (SUV 10.7) however previously identified activity anterolateral left acetabulum is no longer present.  Subcutaneous fluid collection lateral right anterior chest wall nearly completely resolved.  Due to continued clinical benefit from Faslodex/abemaciclib and with only 1 potential new finding in left acetabulum on PET scan, elected to continue current treatment at visit on 4/13/2023 and pursue MRI pelvis to evaluate left acetabulum with potential pursuit of radiation to solitary new left acetabular lesion.  Abemaciclib held 4/13/2023 due to neutropenia (ANC 0.95) and thrombocytopenia (platelet count 93,000).  On 4/27/2023 resumed abemaciclib 150 mg in the morning and subsequently added 100 mg dose in the evening on 5/6/2023.    Patient developed significant hematuria which had originally been interpreted as vaginal bleeding.  She then experienced migration and loss of her left ureteral stent spontaneously.  On 5/5/2023 she underwent replacement of left ureteral stent as well as cauterization of a bleeding site in the bladder.  She underwent at the same setting D&C with biopsy that showed no evidence of endometrium, minute fragment of benign endocervical mucosa.  Complete resolution of the hematuria.   MRI pelvis 5/8/2023 showed multiple areas of metastatic involvement in the pelvis, largest lesion 2.7 cm  involving the anterior left acetabulum (corresponding to the new PET positive lesion).  No prior comparison.  Additional subtle 1 cm left iliac wing, 1.9 cm S2, 1.7 cm right iliac crest, 1.4 cm left pubic ramus, and 2.7 cm posterior right acetabular lesions noted.  There were several other subcentimeter foci of abnormal marrow signal in the sacrum and right iliac bone of unclear significance.  Patient received palliative radiation to the new/progressive lesion in the left anterior acetabulum, received 20 Gray in 5 fractions completed on 5/22/2023.  Due to continued myelosuppression, then the cycle of dose decreased further from 150 mg a.m. and 100 mg p.m. down to 100 mg twice daily on 6/1/2023.  PET scan 7/3/2023 with stable bone lesions with minor variations in degree of hypermetabolic activity likely clinically insignificant.  Increase in moderate ascites with some associated hypermetabolic activity in the anterior pelvis.  Focal 1 cm hypermetabolic soft tissue nodule in the right mesentery with SUV 5.2.  Patient last receiving cycle 23 Faslodex on 9/6/2023 along with continuing abemaciclib 100 mg twice daily.  She is scheduled for repeat PET scan 10/9/2023 after she returns from vacation.  CT abdomen/pelvis 9/29/23: Large amount of ascites, new. Adenopathy improved. No other findings.      *Anemia secondary to ESRD, underlying malignancy/chronic disease, myelosuppression from CDK 4/6 inhibitor, GI blood loss:  Anemia secondary to ESRD, malignancy with exacerbation by use of medication  H/o folate deficiency and iron deficiency  Following initiation of hemodialysis, management of BEAU transitioned to nephrology. On Micera  Transfusing prn  Stool negative for occult blood x3 on 11/2/2021  Patient did develop transient visible blood in stool in July 2022  Stool positive for occult blood x3 on 8/1/2022  Patient was seen by GI on 8/16/2022, felt to be high risk for endoscopic evaluation and elected to forego  EGD/colonoscopy for now  Patient with significant gross hematuria with left ureteral stent migration and eventual loss.  Anemia worsened during this timeframe requiring transfusion support on 4/20/2023 with hemoglobin 6.1 and 5/3/2023 with hemoglobin 6.3.  We typically only transfuse her for hemoglobin less than 7 as her hemoglobin is usually in the 7-8 range.  She does continue every 4-week Mircera with nephrology.  Hemoglobin 7.8, from 6.8 after transfusion on HD, from 7.4, from 8.1     *ESRD on HD:  Patient with previous CKD3/4  Hospitalization 4/29-5/4/2021 with RYAN/CKD, UTI, anemia.  Required initiation of hemodialysis Tuesday Thursday Saturday.   Decrease in frequency of dialysis to twice per week.  She continues to produce a significant amount of urine.   Status post left upper extremity AV fistula placement on 11/3/2021 by vascular surgery  Attempt to hold further dialysis on 1/11/2022 with close monitoring of her laboratory and clinical parameters.  Dialysis quickly resumed due to development of hyperkalemia.  Patient was undergoing dialysis 2 days/week on Mondays and Fridays.    On 8/22/2022 patient transitioned to use of home dialysis device 5 days/week x 2 hours.     *CHF with severe aortic stenosis:  Echocardiogram 7/12/2019 showing ejection fraction 48% with moderate dilation of the LV cavity, global hypokinesis, grade 2 diastolic dysfunction, mild to moderate aortic stenosis, trivial pericardial effusion.  Echocardiogram 7/19/2021 with ejection fraction 56%, left atrial volume moderately increased, grade 2 diastolic dysfunction, severe calcification aortic valve, moderate aortic stenosis, severe mitral calcification, mild mitral stenosis, marked elevation in RVSP from tricuspid regurgitation.  Echocardiogram on 7/13/2022 with ejection fraction 56-60%, grade 2 diastolic dysfunction, severe aortic stenosis.    Cardiac catheterization 8/23/2022 showed normal coronary arteries, mild to moderate pulmonary  hypertension.    She was evaluated by cardiothoracic surgery Dr. Pagan and there was discussion regarding potential candidacy for TAVR although there was concern given her underlying comorbidities including her metastatic breast cancer.  I discussed patient's prognosis with Dr. Pagan.  She does have opportunities for additional treatment of her malignancy with intravenous chemotherapy and is willing to pursue this in the future when needed.  It is unclear as to her expected survival however given her multiple severe comorbidities with metastatic breast cancer, ESRD on dialysis, severe aortic stenosis, severe anemia.  There is consideration of possible pursuit of balloon valvuloplasty initially to assess her symptomatic response and then consider pursuit of TAVR in the future.   Patient did undergo aortic valvuloplasty during admission 1/10 - 1/11/2023.  Cardiology evaluated here     *Left upper and bilateral lower extremity peripheral neuropathy:  Patient reports that left upper extremity neuropathy was not present from previous chemotherapy but occurred after hospitalization that required intubation and critical care stay.  Apparently felt to represent critical care neuropathy.  Improved over time.  Bilateral lower extremity neuropathy felt to be related to diabetes  May have future implications regarding treatment for breast cancer.  Patient reports that peripheral neuropathy symptoms continue in the bilateral lower extremities, unchanged.  This will be a consideration with potential future use of Taxol     *Uterine/endometrial activity on PET scan:  Patient experienced postmenopausal vaginal bleeding in 2013, negative endometrial biopsy and gynecologic evaluation.  With findings on PET scan with enlarging uterus and some hypermetabolic activity, referred back to Dr. Oliveros in gynecology.    9/19/2019 D&C with hysteroscopy by Dr. Oliveros, no significant intraoperative findings, pathologic results with benign  findings, no evidence of malignancy.  Patient with question of vaginal bleeding however subsequently became evident that this was hematuria.  She was referred to gynecology, did undergo on 5/5/2023 D&C at the same time as cystoscopy, biopsy with no evidence of malignancy but no endometrium present.     *Nausea:  Mild, relieved with Zofran.   Patient experienced improvement in nausea after initiating hemodialysis  9/26/2023 patient again is reporting today intermittent episodes of vomiting large amounts of bile beginning over the last 3 days.  See further discussion below.     *Myelosuppression secondary to CDK 4/6 inhibitor:  Patient was able to maintain adequate WBC after dosing alteration on Ibrance to treatment at 100 mg daily for 7 days on followed by 7 days off.  Subsequent transition from Ibrance to abemaciclib  Overall counts improved on abemaciclib  On 6/1/2023, further decrease in abemaciclib dose to 100 mg twice daily due to neutropenia and thrombocytopenia.  Counts dropping     *Depression  Patient with history of depression, worsened after the death of her son in November 2021  With evidence of progressive malignancy in November 2021, patient agreeable to referral to supportive oncology  Patient currently receiving gabapentin 300 mg nightly, Zoloft 150 mg daily  Patient does continue with difficulties regarding depression that wax and wane.  Currently symptoms under fair control.  Patient was previously seeing supportive oncology.  She does not feel that she needs further follow-up at this time however would not rule out returning to see supportive oncology in the future if needed.  We will prescribe her Zoloft at this point.  She remains off of armodafinil.     *Left perinephric stranding secondary to malignancy with hydronephrosis:  CT 4/22/2021 showed interval enlargement of 2 staghorn calculi in the left kidney with persistent left perinephric stranding  Hospitalization 4/29-5/4/2021 with RYAN/CKD,  UTI, anemia.  Required 2 unit PRBC transfusion and initiated hemodialysis Tuesday Thursday Saturday.    Discussion from urology regarding potential need for surgical procedure to address staghorn calculus left kidney  CT scan 7/2/2021 with only 1 remaining left renal stone identified which had decreased in size.  Patient appears to have passed the other stone.  As above, CT 10/26/2021 with more prominent left hydronephrosis and increase in left perinephric and periureteral stranding.  Felt to possibly be related to passage of recent stone versus partial obstruction secondary to debris or thrombus in the left ureter versus pyelonephritis.  Patient however with no clinical evidence of recent stone passage nor pyelonephritis. Malignancy felt to be the cause of CT changes around the left kidney at this point.   Left ureteral stent replacement 12/16/2021  PET scan 1/11/2022 with decrease in fat injection near left renal pelvis, stent in satisfactory position.  Mild residual hydronephrosis on the left.  Ureteral stent replaced on 3/10/2022  PET scan 4/19/2022 with no hydronephrosis, left ureteral stent in place  PET scan 7/21/2022 with persistent left hydronephrosis, ureteral stent in place  PET scan 10/6/2022 with left nephroureteral stent in place, overall stable findings  PET scan 12/29/2022 with left nephroureteral stent remaining in place, stable findings  PET scan 3/30/2023 with left ureteral stent in place.  Patient developed gross hematuria with migration and spontaneous loss of left ureteral stent.  Patient underwent stent replacement on 5/5/2023.  Cauterization of bleeding focus in the bladder.  Resolution of hematuria.  Patient did see urology 8/21/2023, plan further follow-up at 3-month interval.     *Right thyroid nodules  Patient underwent prior thyroid biopsy by her report with benign findings many years ago, records not available  On MRI cervical spine 11/18/2021, finding of 1.8 cm right thyroid  nodule  Thyroid ultrasound 11/26/2021 with right-sided thyroid nodules, 1 nodule was hypoechoic, solid measuring 2 cm with biopsy recommended.  Thyroid ultrasound results reviewed with patient.  In light of her metastatic breast cancer, renal failure the significance of the thyroid nodule is felt to be much less.  We discussed possibility of thyroid biopsy however patient is inclined to forego this, especially since she has had a biopsy performed in the past with benign findings by her report.    No hypermetabolic activity identified on PET scan 1/11/2022 in the neck     *Hospitalization 1/28-1/30/2022 due to COVID-19 infection and C. difficile colitis  Patient fully vaccinated with 3 doses Moderna COVID-19 vaccine  Patient developed symptoms 1/26/2022 with abrupt onset sinus congestion, mild cough, subsequently developed nausea and diarrhea on 1/27/2022.  Significant fatigue.  Patient tested positive in emergency department on 1/28/2022 and was admitted  Patient maintained O2 saturation in mid 90% range on room air  Patient received remdesivir  Patient was also found to be positive for C. difficile colitis, received course of oral vancomycin.  Symptoms from both COVID-19 and C. difficile colitis ultimately resolved.     *Hypocalcemia  Patient developed significant hypocalcemia after receiving Xgeva on 5/19/2022  Calcium management per nephrology  No further Xgeva planned due to persistent significant hypocalcemia  Ca normal     *Diarrhea  Patient with history of C. difficile infection in January 2022  Patient with intermittent diarrhea since around June 2023.  Question whether ongoing intermittent diarrhea is related to abemaciclib   Stool studies on 7/31/2023 with negative GI PCR however C. difficile testing showed negative C. difficile toxin but positive C. difficile PCR.  Patient with ongoing diarrhea.  Received treatment with vancomycin p.o. 125 mg 4 times daily x10 days beginning 8/9/2023.  Patient experienced  complete resolution of diarrhea  Stool with positive C dif PCR but negative ag  ID following. No tx at this time     *Hypokalemia  K normal after HD     PLAN:  Continue to Hold Verzenio for now and following discharge  Paracentesis will not be done prior to discharge and will need to be done outpatient. She very much wants to be discharged rather than wait for the procedure to be done which might not be done until Monday if she were to remain inpatient.  Continue home HD 5 days/week  Patient is receiving Mircera under the direction of nephrology, dosing every 4 weeks as well as IV iron intermittently as needed.  Planning discharge today so she can travel to [place]. Hopefully her symptoms do not recur but I advised her to notify is if they do.  PET scheduled on 10/9 and follow up with Dr. Irvin following that on 10/18    Discussed with her and her     Tai Nieves MD

## 2023-10-01 NOTE — PAYOR COMM NOTE
"Azael Hall (62 y.o. Female)      PATIENT DISCHARGED 23:  REF# [id number]      DEPT: [phone],   [phone]    [facility]: NPI [id number]  [facility]# [id number]       Date of Birth   1960    Social Security Number       Address   [address] [place] [address]    Home Phone   [phone]    MRN   [id number]       Mandaen   Patient Refused    Marital Status                               Admission Date   23    Admission Type   Emergency    Admitting Provider   Mary Rose MD    Attending Provider       Department, Room/Bed   [facility] 6 EAST, E663/1       Discharge Date   2023    Discharge Disposition   Home or Self Care    Discharge Destination                                 Attending Provider: (none)   Allergies: No Known Allergies    Isolation: None   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), C.difficile (23)   Code Status: Prior    Ht: 170.2 cm (67\")   Wt: 171 kg (376 lb 1.7 oz)    Admission Cmt: None   Principal Problem: Intractable vomiting [R11.10]                   Active Insurance as of 2023       Primary Coverage       Payor Plan Insurance Group Employer/Plan Group    [employer] EMPLOYEE [id number]       Payor Plan Address Payor Plan Phone Number Payor Plan Fax Number Effective Dates    [address] [phone]  2022 - None Entered    [place] [address]         Subscriber Name Subscriber Birth Date Member ID       AZAEL HALL 1960 [id number]                     Emergency Contacts        (Rel.) Home Phone Work Phone Mobile Phone    IsabelRk (Spouse) [phone] -- [phone]                 Discharge Summary        Jason Hernandez MD at 23 1130              Patient Name: Azael Hall  : 1960  MRN: [id number]    Date of Admission: 2023  Date of Discharge:  2023  Primary Care Physician: Kiana Noriega ([place]), " APRN      Chief Complaint:   Vomiting      Discharge Diagnoses     Active Hospital Problems    Diagnosis  POA    **Intractable vomiting [R11.10]  Yes      Resolved Hospital Problems   No resolved problems to display.        Hospital Course       This is a 62-year-old woman with metastatic breast cancer, aortic stenosis status post valvuloplasty, congestive heart failure, ESRD on home hemodialysis who presents to the hospital after experiencing refractory diarrhea, thought to be secondary to recurrent C. difficile.  She underwent a CT of the abdomen pelvis that showed large amount of ascites.  Her C. difficile PCR was positive however the C. difficile antigen was negative.  In addition she did not exhibit any leukocytosis or fever.  Infectious disease was consulted and confirmed that she did not have active C. difficile but was instead a carrier.   In any case, her hemoglobin was 6.8 on the morning after admission.  She received 1 unit of PRBCs with dialysis.  She opted to hold off on dialysis as she will be leaving for a family vacation on the day of discharge, 9/30/2023.  She should follow-up with her oncologist for further management of the ascites.      Physical Exam:  Temp:  [97.5 °F (36.4 °C)-98.4 °F (36.9 °C)] 98.4 °F (36.9 °C)  Heart Rate:  [70-79] 79  Resp:  [18] 18  BP: ()/(46-64) 116/59  Body mass index is 58.91 kg/m².  Physical Exam  Constitutional:       Comments: Chronically ill appearing    HENT:      Head: Normocephalic and atraumatic.   Cardiovascular:      Rate and Rhythm: Normal rate and regular rhythm.   Pulmonary:      Effort: Pulmonary effort is normal. No respiratory distress.   Abdominal:      General: There is distension.      Tenderness: There is no abdominal tenderness.   Neurological:      General: No focal deficit present.      Mental Status: She is alert and oriented to person, place, and time.       Consultants     Consult Orders (all) (From admission, onward)       Start      Ordered    09/29/23 0507  Inpatient Case Management  Consult  Once        Provider:  (Not yet assigned)    09/29/23 0507    09/29/23 0456  Inpatient Cardiology Consult  Once        Specialty:  Cardiology  Provider:  Luis Rivers MD    09/29/23 0456    09/28/23 2332  Inpatient Infectious Diseases Consult  Once        Specialty:  Infectious Diseases  Provider:  Lety Nieves MD    09/28/23 2331    09/28/23 2331  Inpatient Hematology & Oncology Consult  Once        Specialty:  Hematology and Oncology  Provider:  Gibson Roque II, MD    09/28/23 2331    09/28/23 1709  Family Medicine Consult  Once        Specialty:  Family Medicine  Provider:  Juan Alberto Lozano MD    09/28/23 1710    09/28/23 1657  LHA (on-call MD unless specified) Details  Once        Specialty:  Hospitalist  Provider:  (Not yet assigned)    09/28/23 1656    09/28/23 1643  Nephrology (on -call MD unless specified)  Once        Specialty:  Nephrology  Provider:  Tai Antonio MD    09/28/23 1642    09/28/23 1642  Hematology and Oncology (on-call MD unless specified)  Once        Specialty:  Hematology and Oncology  Provider:  (Not yet assigned)    09/28/23 1642                  Procedures     Imaging Results (All)       Procedure Component Value Units Date/Time    CT Abdomen Pelvis Without Contrast [[id number]] Collected: 09/28/23 1514     Updated: 09/28/23 1522    Narrative:      Examination: CT of the abdomen and pelvis without contrast     Technique: CT of the abdomen and pelvis without contrast per protocol.  Radiation dose reduction techniques were utilized, including automated  exposure control and exposure modulation based on body size.        History: Abdominal pain, cancer, and diarrhea     Comparison: PET/CT of 10/6/2022.     Bone windows demonstrate degenerative changes, without suspicious  osseous lesion seen.       Impression:      Large amount of ascites. Incidental findings as above.      Findings: Limited evaluation of the inferior thorax demonstrates  atelectasis, without consolidation, pleural effusion, or pneumothorax.  The heart is mildly enlarged, without pericardial effusion. Mitral  annular calcifications are seen.     There is a large amount of ascites. The kidneys are atrophic. There is a  left-sided double-J ureteral stent.     The liver, spleen, adrenal glands, pancreas, stomach, small bowel, large  bowel, uterus, and abdominal vasculature are normal as evaluated on this  noncontrast examination. No intraperitoneal free gas is seen. No  enlarged lymph nodes are demonstrated. Bilateral external iliac chain  lymph nodes are smaller, measuring up to 9 mm in short axis diameter.     Bone windows demonstrate degenerative changes, without suspicious  osseous lesion seen.     IMPRESSION:  1. Large amount of ascites, new     2. Smaller external iliac chain lymph nodes     3. Incidental findings as above.     This report was finalized on 9/28/2023 3:19 PM by Dr. Ashish Farmer M.D.               Pertinent Labs     Results from last 7 days   Lab Units 09/30/23  0711 09/29/23  0645 09/28/23  1341 09/26/23  1006   WBC 10*3/mm3 3.30* 2.68* 2.64* 3.59   HEMOGLOBIN g/dL 7.8* 6.8* 7.4* 8.1*   PLATELETS 10*3/mm3 95* 101* 110* 121*     Results from last 7 days   Lab Units 09/30/23  1028 09/29/23  0645 09/28/23  1341 09/26/23  1006   SODIUM mmol/L 135* 135* 137 135*   POTASSIUM mmol/L 3.6 2.7* 2.8* 2.9*   CHLORIDE mmol/L 96* 97* 95* 96*   CO2 mmol/L 23.0 25.0 27.0 21.6*   BUN mg/dL 23 30* 26* 34*   CREATININE mg/dL 5.16* 7.18* 6.85* 7.74*   GLUCOSE mg/dL 183* 148* 178* 117*   Estimated Creatinine Clearance: 18.3 mL/min (A) (by C-G formula based on SCr of 5.16 mg/dL (H)).  Results from last 7 days   Lab Units 09/28/23  1341 09/26/23  1006   ALBUMIN g/dL 2.7* 3.0*   BILIRUBIN mg/dL 0.6 0.7   ALK PHOS U/L 104 100   AST (SGOT) U/L 12 26   ALT (SGPT) U/L 11 6     Results from last 7 days   Lab Units  09/30/23  1028 09/29/23  0645 09/28/23  1711 09/28/23  1341 09/26/23  1006   CALCIUM mg/dL 8.3* 8.2*  --  8.7 8.5*   ALBUMIN g/dL  --   --   --  2.7* 3.0*   MAGNESIUM mg/dL 2.6*  --  1.9  --   --      Results from last 7 days   Lab Units 09/28/23  1341   LIPASE U/L 22             Invalid input(s): LDLCALC  Results from last 7 days   Lab Units 09/28/23  1711 09/28/23  1341   BLOODCX  No growth at 24 hours No growth at 24 hours       Test Results Pending at Discharge     Pending Labs       Order Current Status    Blood Culture - Blood, Arm, Right Preliminary result    Blood Culture - Blood, Arm, Right Preliminary result            Discharge Details        Discharge Medications        Changes to Medications        Instructions Start Date   levothyroxine 50 MCG tablet  Commonly known as: SYNTHROID, LEVOTHROID  What changed: when to take this   50 mcg, Oral, Daily             Continue These Medications        Instructions Start Date   acetaminophen 500 MG tablet  Commonly known as: TYLENOL   1,000 mg, Oral, As Needed      atorvastatin 40 MG tablet  Commonly known as: LIPITOR   TAKE ONE TABLET BY MOUTH ONCE NIGHTLY      diphenoxylate-atropine 2.5-0.025 MG per tablet  Commonly known as: LOMOTIL   1 tablet, Oral, 4 Times Daily PRN      folic acid 1 MG tablet  Commonly known as: FOLVITE   TAKE 1 TABLET BY MOUTH DAILY      Fulvestrant 250 MG/5ML chemo syringe  Commonly known as: FASLODEX   Intramuscular, Every 30 Days, In MD office      gabapentin 300 MG capsule  Commonly known as: Neurontin   300 mg, Oral, Nightly      HYDROcodone-acetaminophen 5-325 MG per tablet  Commonly known as: NORCO   1-2 tablets, Oral, Every 4 Hours PRN      Levemir FlexPen 100 UNIT/ML injection  Generic drug: insulin detemir   50 Units, Subcutaneous, Daily      mupirocin 2 % ointment  Commonly known as: BACTROBAN   1 application , Topical, Daily, Applied to fistula insertion sites for dialysis       ondansetron 8 MG tablet  Commonly known as:  ZOFRAN   TAKE ONE TABLET BY MOUTH EVERY 8 HOURS AS NEEDED FOR NAUSEA OR VOMITING      sertraline 100 MG tablet  Commonly known as: ZOLOFT   TAKE 1 AND 1/2 TABLET BY MOUTH DAILY      vancomycin 125 MG capsule  Commonly known as: Vancocin   125 mg, Oral, 4 Times Daily      Verzenio 100 MG tablet  Generic drug: Abemaciclib   100 mg, Oral, 2 Times Daily      vitamin B-12 1000 MCG tablet  Commonly known as: CYANOCOBALAMIN   1,000 mcg, Oral, Daily               No Known Allergies      Discharge Disposition:  Home or Self Care    Discharge Diet:  Diet Order   Procedures    Diet: Cardiac Diets; Healthy Heart (2-3 Na+); Texture: Regular Texture (IDDSI 7); Fluid Consistency: Thin (IDDSI 0)       Discharge Activity:       CODE STATUS:    Code Status and Medical Interventions:   Ordered at: 09/28/23 2153     Code Status (Patient has no pulse and is not breathing):    CPR (Attempt to Resuscitate)     Medical Interventions (Patient has pulse or is breathing):    Full       Future Appointments   Date Time Provider Department Center   10/9/2023  8:00 AM [facility] PET ADMIN RM 1  HAY PET [facility]AY   10/9/2023  9:15 AM HAY PET 1  HAY PET [facility]AY   10/18/2023  7:40 AM LAB CHAIR 5 CBC [facility]  LAB [facility] [place]   10/18/2023  8:00 AM Leodan Irvin Jr., MD [facility] CBC KRES [place]   10/18/2023  8:45 AM INJECTION CHAIR [facility] KRE BH INFUS KRE [facility]AG   11/17/2023  9:15 AM HAY LCG ECHO/VAS RM 1 BH LCG ECHO HAY   11/17/2023 10:00 AM Sherine Badillo APRN MGK  LCG[facility] HAY      Follow-up Information       Kiana Noriega APRN ([place]) .    Specialty: Family Medicine  Contact information:  [address] [place] [id number] [phone]                             Time Spent on Discharge:  Greater than 30 minutes      Jason Hernandez MD  [facility]  09/30/23  11:30 EDT                Electronically signed by Jason Hernandez MD at 09/30/23 1139       Discharge Order (From admission, onward)       Start     Ordered    09/30/23  0935  Discharge patient  Once        Expected Discharge Date: 09/30/23   Discharge Disposition: Home or Self Care   Physician of Record for Attribution - Please select from Treatment Team: YASMIN SOLANO [[id number]]   Review needed by CMO to determine Physician of Record: No      Question Answer Comment   Physician of Record for Attribution - Please select from Treatment Team YASMIN SOLANO    Review needed by CMO to determine Physician of Record No        09/30/23 0934

## 2023-10-02 ENCOUNTER — TRANSITIONAL CARE MANAGEMENT TELEPHONE ENCOUNTER (OUTPATIENT)
Dept: CALL CENTER | Facility: HOSPITAL | Age: 63
End: 2023-10-02
Payer: COMMERCIAL

## 2023-10-02 DIAGNOSIS — R18.8 OTHER ASCITES: Primary | ICD-10-CM

## 2023-10-02 NOTE — CASE MANAGEMENT/SOCIAL WORK
Case Management Discharge Note      Final Note: Home. Marlen JUAREZ         Selected Continued Care - Discharged on 9/30/2023 Admission date: 9/28/2023 - Discharge disposition: Home or Self Care      Destination    No services have been selected for the patient.                Durable Medical Equipment    No services have been selected for the patient.                Dialysis/Infusion    No services have been selected for the patient.                Home Medical Care    No services have been selected for the patient.                Therapy    No services have been selected for the patient.                Community Resources    No services have been selected for the patient.                Community & DME    No services have been selected for the patient.                    Selected Continued Care - Episodes Includes continued care and service providers with selected services from the active episodes listed below      Oncology Episode start date: 11/15/2021   There are no active outsourced providers for this episode.                      Final Discharge Disposition Code: 01 - home or self-care

## 2023-10-02 NOTE — OUTREACH NOTE
Call Center TCM Note      Flowsheet Row Responses   [facility] patient discharged from? [place]   Does the patient have one of the following disease processes/diagnoses(primary or secondary)? Other   TCM attempt successful? Yes   Call start time 1050   Call end time 1053   Discharge diagnosis Intractable vomiting   Meds reviewed with patient/caregiver? Yes   Does the patient have all medications ordered at discharge? N/A   Does the patient have an appointment with their PCP within 7-14 days of discharge? No   Nursing Interventions Patient desires to follow up with specialty only, Routed TCM call to PCP office, Patient declined scheduling/rescheduling appointment at this time   Has home health visited the patient within 72 hours of discharge? N/A   Psychosocial issues? No   Did the patient receive a copy of their discharge instructions? Yes   Nursing interventions Reviewed instructions with patient   What is the patient's perception of their health status since discharge? Improving   Is the patient/caregiver able to teach back signs and symptoms related to disease process for when to call PCP? Yes   Is the patient/caregiver able to teach back signs and symptoms related to disease process for when to call 911? Yes   Is the patient/caregiver able to teach back the hierarchy of who to call/visit for symptoms/problems? PCP, Specialist, Home health nurse, Urgent Care, ED, 911 Yes   If the patient is a current smoker, are they able to teach back resources for cessation? Not a smoker   TCM call completed? Yes   Wrap up additional comments D/C DX,  Intractable V/D - Pt feeling much better. Actually on vacation with family. No changes to current medications at d/c. OF NOTE: pt is foregoing dialysis while out of town. Pt will see Oncologist 10/18/2023. Pt declines TCM appt with PCP BETY Noriega.   Call end time 1053            Mary Villeda MA    10/2/2023, 10:55 EDT

## 2023-10-03 LAB — BACTERIA SPEC AEROBE CULT: NORMAL

## 2023-10-03 NOTE — PROGRESS NOTES
10/09/23 0003   Pre-Procedure Phone Call   Procedure Time Verified Yes   Arrival Time 1230   Procedure Location Verified Yes   Medical History Reviewed No   NPO Status Reinforced Yes   Ride and Caregiver Arranged Yes   Patient Knows to Bring Current Medications No   Bring Outside Films Requested No

## 2023-10-03 NOTE — TELEPHONE ENCOUNTER
PATIENT CALLED TO CHECK THE STATUS OF THIS REQUEST - APPOINTMENT SCHEDULED FOR FIRST AVAILABLE ON 10/06/23.    PATIENT DOES NOT WANT THIS TO GO TO [facility], SHE NEEDS IT SENT TO THE FOLLOWING PHARMACY:    [facility] - [address] - [phone]  - [phone]  [phone]

## 2023-10-04 LAB — BACTERIA SPEC AEROBE CULT: NORMAL

## 2023-10-09 ENCOUNTER — HOSPITAL ENCOUNTER (OUTPATIENT)
Dept: PET IMAGING | Facility: HOSPITAL | Age: 63
Discharge: HOME | End: 2023-10-09
Payer: MEDICARE

## 2023-10-09 ENCOUNTER — APPOINTMENT (OUTPATIENT)
Dept: PET IMAGING | Facility: HOSPITAL | Age: 63
End: 2023-10-09
Payer: MEDICARE

## 2023-10-09 ENCOUNTER — HOSPITAL ENCOUNTER (OUTPATIENT)
Dept: ULTRASOUND IMAGING | Facility: HOSPITAL | Age: 63
Discharge: HOME | End: 2023-10-09
Admitting: INTERNAL MEDICINE
Payer: MEDICARE

## 2023-10-09 VITALS
WEIGHT: 293 LBS | HEART RATE: 71 BPM | OXYGEN SATURATION: 99 % | SYSTOLIC BLOOD PRESSURE: 106 MMHG | DIASTOLIC BLOOD PRESSURE: 60 MMHG | TEMPERATURE: 97.8 F | RESPIRATION RATE: 16 BRPM | BODY MASS INDEX: 45.99 KG/M2 | HEIGHT: 67 IN

## 2023-10-09 DIAGNOSIS — R18.8 OTHER ASCITES: ICD-10-CM

## 2023-10-09 LAB
ALBUMIN FLD-MCNC: 1.8 G/DL
APPEARANCE FLD: ABNORMAL
COLOR FLD: YELLOW
LYMPHOCYTES NFR FLD MANUAL: 8 %
METHOD: ABNORMAL
MONOS+MACROS NFR FLD: 78 %
NEUTROPHILS NFR FLD MANUAL: 14 %
NUC CELL # FLD: 144 /MM3
RBC # FLD AUTO: 323 /MM3

## 2023-10-09 PROCEDURE — 88360 TUMOR IMMUNOHISTOCHEM/MANUAL: CPT | Performed by: INTERNAL MEDICINE

## 2023-10-09 PROCEDURE — 89051 BODY FLUID CELL COUNT: CPT | Performed by: INTERNAL MEDICINE

## 2023-10-09 PROCEDURE — 88341 IMHCHEM/IMCYTCHM EA ADD ANTB: CPT | Performed by: INTERNAL MEDICINE

## 2023-10-09 PROCEDURE — 0 LIDOCAINE 1 % SOLUTION: Performed by: RADIOLOGY

## 2023-10-09 PROCEDURE — 87015 SPECIMEN INFECT AGNT CONCNTJ: CPT | Performed by: INTERNAL MEDICINE

## 2023-10-09 PROCEDURE — 82042 OTHER SOURCE ALBUMIN QUAN EA: CPT | Performed by: INTERNAL MEDICINE

## 2023-10-09 PROCEDURE — 87070 CULTURE OTHR SPECIMN AEROBIC: CPT | Performed by: INTERNAL MEDICINE

## 2023-10-09 PROCEDURE — 88305 TISSUE EXAM BY PATHOLOGIST: CPT | Performed by: INTERNAL MEDICINE

## 2023-10-09 PROCEDURE — 88112 CYTOPATH CELL ENHANCE TECH: CPT | Performed by: INTERNAL MEDICINE

## 2023-10-09 PROCEDURE — 76942 ECHO GUIDE FOR BIOPSY: CPT

## 2023-10-09 PROCEDURE — 87205 SMEAR GRAM STAIN: CPT | Performed by: INTERNAL MEDICINE

## 2023-10-09 PROCEDURE — 88342 IMHCHEM/IMCYTCHM 1ST ANTB: CPT | Performed by: INTERNAL MEDICINE

## 2023-10-09 RX ORDER — SODIUM CHLORIDE 9 MG/ML
40 INJECTION, SOLUTION INTRAVENOUS AS NEEDED
Status: DISCONTINUED | OUTPATIENT
Start: 2023-10-09 | End: 2023-10-10 | Stop reason: HOSPADM

## 2023-10-09 RX ORDER — SODIUM CHLORIDE 0.9 % (FLUSH) 0.9 %
3 SYRINGE (ML) INJECTION EVERY 12 HOURS SCHEDULED
Status: CANCELLED | OUTPATIENT
Start: 2023-10-09

## 2023-10-09 RX ORDER — SODIUM CHLORIDE 0.9 % (FLUSH) 0.9 %
10 SYRINGE (ML) INJECTION AS NEEDED
Status: DISCONTINUED | OUTPATIENT
Start: 2023-10-09 | End: 2023-10-10 | Stop reason: HOSPADM

## 2023-10-09 RX ORDER — SODIUM CHLORIDE 0.9 % (FLUSH) 0.9 %
3 SYRINGE (ML) INJECTION EVERY 12 HOURS SCHEDULED
Status: DISCONTINUED | OUTPATIENT
Start: 2023-10-09 | End: 2023-10-10 | Stop reason: HOSPADM

## 2023-10-09 RX ORDER — SODIUM CHLORIDE 9 MG/ML
40 INJECTION, SOLUTION INTRAVENOUS AS NEEDED
Status: CANCELLED | OUTPATIENT
Start: 2023-10-09

## 2023-10-09 RX ORDER — LIDOCAINE HYDROCHLORIDE 10 MG/ML
10 INJECTION, SOLUTION INFILTRATION; PERINEURAL ONCE
Status: COMPLETED | OUTPATIENT
Start: 2023-10-09 | End: 2023-10-09

## 2023-10-09 RX ORDER — POTASSIUM CHLORIDE 1500 MG/1
20 TABLET, EXTENDED RELEASE ORAL DAILY
Status: ON HOLD | COMMUNITY
Start: 2023-09-27

## 2023-10-09 RX ORDER — ALPRAZOLAM 1 MG/1
1 TABLET ORAL ONCE
Status: COMPLETED | OUTPATIENT
Start: 2023-10-09 | End: 2023-10-09

## 2023-10-09 RX ORDER — SODIUM CHLORIDE 0.9 % (FLUSH) 0.9 %
10 SYRINGE (ML) INJECTION AS NEEDED
Status: CANCELLED | OUTPATIENT
Start: 2023-10-09

## 2023-10-09 RX ADMIN — ALPRAZOLAM 1 MG: 1 TABLET ORAL at 13:02

## 2023-10-09 RX ADMIN — LIDOCAINE HYDROCHLORIDE 6 ML: 10 INJECTION, SOLUTION INFILTRATION; PERINEURAL at 14:04

## 2023-10-09 NOTE — NURSING NOTE
Health Maintenance Due   Topic Date Due   • Influenza Vaccine (1) 09/01/2020   • Meningococcal Vaccine (1 - 2-dose series) 01/21/2021   • DTaP/Tdap/Td Vaccine (6 - Tdap) 01/21/2021   • HPV Vaccine (1 - 2-dose series) 01/21/2021   • Annual Physical (ages 3-18)  01/29/2021       Patient is due for topics listed above, she wishes to proceed with Immunization(s) Dtap/Tdap/Td, HPV and Meningococcal and Annual Wellness Visit (ages 3-18), but is not proceeding with Immunization(s) Influenza at this time.         Pt arrived to bay [facility] for a first time paracentesis.  Pt  at bedside.  NAD noted.

## 2023-10-09 NOTE — DISCHARGE INSTRUCTIONS
EDUCATION /DISCHARGE INSTRUCTIONS  Paracentesis:  A needle is inserted into the space between your abdominal organs and the membrane that surrounds them (peritoneal space).  It is done for the diagnosis and treatment of fluid that is resistant to other therapies.  It helps determine the cause of the fluid and at the relieves pressure created by the fluid.  A sample is obtained and sent to the laboratory for study.  During the procedure:  You will lie on a bed on your back with your legs drawn up.  Your abdomen will be exposed from the chest to the pelvis.  You will otherwise be covered to maintain comfort.  A physician will clean your abdomen with antiseptic soap, place a sterile towel around the site and administer a local anesthetic to numb the area.  The physician will insert a needle into your abdominal wall.  There may be a popping sound which signifies the needle has pierced the abdominal wall. Next, the physician will attach tubing to transfer a sample into a collection bottle.  After the fluid is obtained the needle will be removed.  A pressure dressing is applied to the site.  Risks of the procedure include but are not limited to:   *  Bleeding    *  Wound infection   *  Low blood pressure   *  Decreased urination   *  Low sodium if a large amount of fluid is removed   *  Puncture of abdominal organs by the needle    Benefits of the procedure:  Benefits include the removal of fluid from the abdomen, relief of abdominal pressure and facilitation of a diagnosis.  Alternatives to the procedure:  Possible alternatives are diuretic drug therapy or surgery to place a shunt to drain fluid.  Risks of diuretic drug therapy include possible dehydration and renal failure.  The benefit of drug therapy is that it can be done at home under physician supervision.  Risks of shunt placement include exposure to anesthesia, infection, excessive bleeding and injury to abdominal organs.  The benefit of a shunt is that it can be  used to drain fluid over a longer period of time.  THIS EDUCATION INFORMATION WAS REVIEWED PRIOR TO THE PROCEDURE AND CONSENT. Patient initials__________________Time_________________    Post procedure: (Follow all the instructions below carefully)   *  Weigh yourself daily.   *  Follow your doctors dietary instructions.   *  Rest today (no pushing pulling, straining or heavy lifting).   *  Slowly increase activity tomorow.   *  If you received sedation do not drive for 24 hours.              * Skin affix  applied to puncture site. Do not try to remove, scratch or apply lotion   * Skin affix will fall off on it's own   *  You may shower tomorrow  Call your doctor if experiencing:   *  Signs of infection such as redness, swelling, excessive pain and / or foul       smelling drainage from the puncture site.   *  Chills or fever over 101 degrees (by mouth).   *  Fainting or any noted Rapid weight gain/loss   *  Unrelieved pain or any new or severe symptoms  Following the procedure:      Follow-up with the ordering physician as directed.   Continue to take other medications as directed by your physician unless    otherwise instructed.   If applicable, resume taking your blood thinners or Aspirin in 24 hours.              If you have any concerns please call the Radiology Nurses Desk at [phone]

## 2023-10-10 ENCOUNTER — SPECIALTY PHARMACY (OUTPATIENT)
Dept: PHARMACY | Facility: HOSPITAL | Age: 63
End: 2023-10-10
Payer: MEDICARE

## 2023-10-11 ENCOUNTER — TELEPHONE (OUTPATIENT)
Dept: ONCOLOGY | Facility: CLINIC | Age: 63
End: 2023-10-11
Payer: MEDICARE

## 2023-10-11 NOTE — TELEPHONE ENCOUNTER
Called pt and left msg on pt vm - We rescheduled  PET scan for Friday 10/13/23 -asked pt to call to confirm

## 2023-10-12 ENCOUNTER — TELEPHONE (OUTPATIENT)
Dept: ONCOLOGY | Facility: CLINIC | Age: 63
End: 2023-10-12
Payer: MEDICARE

## 2023-10-12 DIAGNOSIS — C50.919 PRIMARY MALIGNANT NEOPLASM OF BREAST WITH METASTASIS: Primary | ICD-10-CM

## 2023-10-12 LAB
BACTERIA FLD CULT: NORMAL
CYTO UR: NORMAL
GRAM STN SPEC: NORMAL
GRAM STN SPEC: NORMAL
LAB AP CASE REPORT: NORMAL
LAB AP SPECIAL STAINS: NORMAL
PATH REPORT.FINAL DX SPEC: NORMAL
PATH REPORT.GROSS SPEC: NORMAL

## 2023-10-12 RX ORDER — HYDROCODONE BITARTRATE AND ACETAMINOPHEN 5; 325 MG/1; MG/1
1 TABLET ORAL EVERY 4 HOURS PRN
Qty: 90 TABLET | Refills: 0 | Status: CANCELLED | OUTPATIENT
Start: 2023-10-12

## 2023-10-12 RX ORDER — HYDROCODONE BITARTRATE AND ACETAMINOPHEN 5; 325 MG/1; MG/1
1 TABLET ORAL EVERY 4 HOURS PRN
Qty: 90 TABLET | Refills: 0 | Status: ON HOLD | OUTPATIENT
Start: 2023-10-12 | End: 2023-10-13

## 2023-10-12 RX ORDER — HYDROCODONE BITARTRATE AND ACETAMINOPHEN 5; 325 MG/1; MG/1
1 TABLET ORAL EVERY 4 HOURS PRN
Qty: 90 TABLET | Refills: 0 | Status: SHIPPED | OUTPATIENT
Start: 2023-10-12 | End: 2023-10-12

## 2023-10-12 NOTE — TELEPHONE ENCOUNTER
"lisa is calling in and she states that she wants to push everything back by a week for her pet scan and the md visit. I explained the importance of keeping these but she kept stating we \"needed to give her a week.\" She states the area where they did the paracentesis on Monday hurts her really bad and she has been trying tylenol and it is not touching it and she does not have anything else. I asked if we could control her pain better if she would come in for her scan and she still refused and stated she wanted a week. She also fell 2 times and she states she does not need to be evaluated and she states she \"only hurt her ego.\"     I then talked with Dr. Irvin and he agreed that the patient needed to be assessed and he was agreeable to norco 5-325mg tablets take 1 tablet every 4 hours prn for pain. I called her back and explained this and she refused multiple times to come in for her pet and to be seen next week. She stated she does not feel comfortable with this and once again asked us to \"give her one week.\" I let her know about the pain medication and went over adverse effect of constipation and she v/u. I will update Dr. Irvin.   "

## 2023-10-12 NOTE — TELEPHONE ENCOUNTER
Caller: Juana Hall    Relationship: Self    Best call back number: [phone]     What is the best time to reach you: ASAP    Who are you requesting to speak with (clinical staff, provider,  specific staff member): CLINICAL     What was the call regarding: PT HAD PARACENTESIS ON MONDAY AND SINCE THEN THE PT HAS HAD A LOT OF WEAKNESS, PAIN AND FALLEN A FEW TIMES. WOULD LIKE DR HEATH TO BE AWARE OF PT CONDITION.     THE PT IS CONSIDERING PUSHING OUT THE PET SCAN AND FOLLOW UP WITH DR. HEATH, PT DOESN'T FEEL THAT SHE CAN DO THE PET SCAN IN THE CONDITION SHE IS IN AND HOW SHE IS FEELING AT THIS MOMENT.     Is it okay if the provider responds through [facility]: NO - PLEASE CALL BACK TO DISCUSS.

## 2023-10-13 ENCOUNTER — APPOINTMENT (OUTPATIENT)
Dept: GENERAL RADIOLOGY | Facility: HOSPITAL | Age: 63
End: 2023-10-13
Payer: MEDICARE

## 2023-10-13 ENCOUNTER — APPOINTMENT (OUTPATIENT)
Dept: CT IMAGING | Facility: HOSPITAL | Age: 63
End: 2023-10-13
Payer: MEDICARE

## 2023-10-13 ENCOUNTER — HOSPITAL ENCOUNTER (INPATIENT)
Facility: HOSPITAL | Age: 63
LOS: 12 days | Discharge: NURSING, REHAB OR LONG-TERM CARE | End: 2023-10-26
Attending: EMERGENCY MEDICINE | Admitting: INTERNAL MEDICINE
Payer: MEDICARE

## 2023-10-13 ENCOUNTER — TELEPHONE (OUTPATIENT)
Dept: ONCOLOGY | Facility: CLINIC | Age: 63
End: 2023-10-13
Payer: MEDICARE

## 2023-10-13 DIAGNOSIS — I95.9 HYPOTENSION, UNSPECIFIED HYPOTENSION TYPE: Primary | ICD-10-CM

## 2023-10-13 DIAGNOSIS — N39.0 ACUTE UTI: ICD-10-CM

## 2023-10-13 DIAGNOSIS — C50.919 PRIMARY MALIGNANT NEOPLASM OF BREAST WITH METASTASIS: ICD-10-CM

## 2023-10-13 DIAGNOSIS — D63.1 ANEMIA IN STAGE 3 CHRONIC KIDNEY DISEASE, UNSPECIFIED WHETHER STAGE 3A OR 3B CKD: ICD-10-CM

## 2023-10-13 DIAGNOSIS — F41.9 ANXIETY AND DEPRESSION: ICD-10-CM

## 2023-10-13 DIAGNOSIS — S92.812A OTHER FRACTURE OF LEFT FOOT, INITIAL ENCOUNTER FOR CLOSED FRACTURE: ICD-10-CM

## 2023-10-13 DIAGNOSIS — N18.30 ANEMIA IN STAGE 3 CHRONIC KIDNEY DISEASE, UNSPECIFIED WHETHER STAGE 3A OR 3B CKD: ICD-10-CM

## 2023-10-13 DIAGNOSIS — N18.6 END-STAGE RENAL DISEASE ON HEMODIALYSIS: ICD-10-CM

## 2023-10-13 DIAGNOSIS — F32.A ANXIETY AND DEPRESSION: ICD-10-CM

## 2023-10-13 DIAGNOSIS — Z99.2 END-STAGE RENAL DISEASE ON HEMODIALYSIS: ICD-10-CM

## 2023-10-13 PROBLEM — R82.90 ABNORMAL URINALYSIS: Status: ACTIVE | Noted: 2023-01-01

## 2023-10-13 PROBLEM — D64.9 ANEMIA: Status: ACTIVE | Noted: 2023-10-13

## 2023-10-13 PROBLEM — E87.6 HYPOKALEMIA: Status: ACTIVE | Noted: 2023-01-01

## 2023-10-13 LAB
ABO GROUP BLD: NORMAL
ALBUMIN SERPL-MCNC: 1.9 G/DL (ref 3.5–5.2)
ALBUMIN/GLOB SERPL: 0.5 G/DL
ALP SERPL-CCNC: 115 U/L (ref 39–117)
ALT SERPL W P-5'-P-CCNC: 14 U/L (ref 1–33)
ANION GAP SERPL CALCULATED.3IONS-SCNC: 13.6 MMOL/L (ref 5–15)
AST SERPL-CCNC: 15 U/L (ref 1–32)
BACTERIA UR QL AUTO: ABNORMAL /HPF
BASOPHILS # BLD AUTO: 0.03 10*3/MM3 (ref 0–0.2)
BASOPHILS NFR BLD AUTO: 0.5 % (ref 0–1.5)
BILIRUB SERPL-MCNC: 0.4 MG/DL (ref 0–1.2)
BILIRUB UR QL STRIP: NEGATIVE
BLD GP AB SCN SERPL QL: NEGATIVE
BUN SERPL-MCNC: 19 MG/DL (ref 8–23)
BUN/CREAT SERPL: 3.9 (ref 7–25)
CALCIUM SPEC-SCNC: 8.4 MG/DL (ref 8.6–10.5)
CHLORIDE SERPL-SCNC: 96 MMOL/L (ref 98–107)
CLARITY UR: ABNORMAL
CO2 SERPL-SCNC: 26.4 MMOL/L (ref 22–29)
COLOR UR: ABNORMAL
CREAT SERPL-MCNC: 4.84 MG/DL (ref 0.57–1)
D-LACTATE SERPL-SCNC: 2.8 MMOL/L (ref 0.5–2)
D-LACTATE SERPL-SCNC: 3.4 MMOL/L (ref 0.5–2)
D-LACTATE SERPL-SCNC: 3.5 MMOL/L (ref 0.5–2)
DEPRECATED RDW RBC AUTO: 51.7 FL (ref 37–54)
EGFRCR SERPLBLD CKD-EPI 2021: 9.4 ML/MIN/1.73
EOSINOPHIL # BLD AUTO: 0.02 10*3/MM3 (ref 0–0.4)
EOSINOPHIL NFR BLD AUTO: 0.3 % (ref 0.3–6.2)
ERYTHROCYTE [DISTWIDTH] IN BLOOD BY AUTOMATED COUNT: 15.3 % (ref 12.3–15.4)
GEN 5 2HR TROPONIN T REFLEX: 51 NG/L
GLOBULIN UR ELPH-MCNC: 3.8 GM/DL
GLUCOSE BLDC GLUCOMTR-MCNC: 116 MG/DL (ref 70–130)
GLUCOSE BLDC GLUCOMTR-MCNC: 128 MG/DL (ref 70–130)
GLUCOSE BLDC GLUCOMTR-MCNC: 240 MG/DL (ref 70–130)
GLUCOSE SERPL-MCNC: 156 MG/DL (ref 65–99)
GLUCOSE UR STRIP-MCNC: NEGATIVE MG/DL
HCT VFR BLD AUTO: 26.2 % (ref 34–46.6)
HGB BLD-MCNC: 8.7 G/DL (ref 12–15.9)
HGB UR QL STRIP.AUTO: ABNORMAL
HYALINE CASTS UR QL AUTO: ABNORMAL /LPF
IMM GRANULOCYTES # BLD AUTO: 0.09 10*3/MM3 (ref 0–0.05)
IMM GRANULOCYTES NFR BLD AUTO: 1.5 % (ref 0–0.5)
INR PPP: 1.36 (ref 0.9–1.1)
KETONES UR QL STRIP: NEGATIVE
LEUKOCYTE ESTERASE UR QL STRIP.AUTO: ABNORMAL
LYMPHOCYTES # BLD AUTO: 0.53 10*3/MM3 (ref 0.7–3.1)
LYMPHOCYTES NFR BLD AUTO: 9.1 % (ref 19.6–45.3)
MAGNESIUM SERPL-MCNC: 1.7 MG/DL (ref 1.6–2.4)
MCH RBC QN AUTO: 31.6 PG (ref 26.6–33)
MCHC RBC AUTO-ENTMCNC: 33.2 G/DL (ref 31.5–35.7)
MCV RBC AUTO: 95.3 FL (ref 79–97)
MONOCYTES # BLD AUTO: 0.4 10*3/MM3 (ref 0.1–0.9)
MONOCYTES NFR BLD AUTO: 6.8 % (ref 5–12)
NEUTROPHILS NFR BLD AUTO: 4.77 10*3/MM3 (ref 1.7–7)
NEUTROPHILS NFR BLD AUTO: 81.8 % (ref 42.7–76)
NITRITE UR QL STRIP: POSITIVE
NRBC BLD AUTO-RTO: 0.2 /100 WBC (ref 0–0.2)
NT-PROBNP SERPL-MCNC: ABNORMAL PG/ML (ref 0–900)
PH UR STRIP.AUTO: 7 [PH] (ref 5–8)
PLATELET # BLD AUTO: 123 10*3/MM3 (ref 140–450)
PMV BLD AUTO: 10.6 FL (ref 6–12)
POTASSIUM SERPL-SCNC: 2.9 MMOL/L (ref 3.5–5.2)
PROCALCITONIN SERPL-MCNC: 3.83 NG/ML (ref 0–0.25)
PROT SERPL-MCNC: 5.7 G/DL (ref 6–8.5)
PROT UR QL STRIP: ABNORMAL
PROTHROMBIN TIME: 17 SECONDS (ref 11.7–14.2)
QT INTERVAL: 384 MS
QTC INTERVAL: 465 MS
RBC # BLD AUTO: 2.75 10*6/MM3 (ref 3.77–5.28)
RBC # UR STRIP: ABNORMAL /HPF
REF LAB TEST METHOD: ABNORMAL
RH BLD: POSITIVE
SARS-COV-2 RNA RESP QL NAA+PROBE: NOT DETECTED
SODIUM SERPL-SCNC: 136 MMOL/L (ref 136–145)
SP GR UR STRIP: 1.02 (ref 1–1.03)
SQUAMOUS #/AREA URNS HPF: ABNORMAL /HPF
T&S EXPIRATION DATE: NORMAL
T4 FREE SERPL-MCNC: 0.72 NG/DL (ref 0.93–1.7)
TROPONIN T DELTA: 0 NG/L
TROPONIN T SERPL HS-MCNC: 51 NG/L
TSH SERPL DL<=0.05 MIU/L-ACNC: 2.81 UIU/ML (ref 0.27–4.2)
UROBILINOGEN UR QL STRIP: ABNORMAL
WBC # UR STRIP: ABNORMAL /HPF
WBC NRBC COR # BLD: 5.84 10*3/MM3 (ref 3.4–10.8)

## 2023-10-13 PROCEDURE — 36415 COLL VENOUS BLD VENIPUNCTURE: CPT | Performed by: EMERGENCY MEDICINE

## 2023-10-13 PROCEDURE — 80053 COMPREHEN METABOLIC PANEL: CPT | Performed by: EMERGENCY MEDICINE

## 2023-10-13 PROCEDURE — 86922 COMPATIBILITY TEST ANTIGLOB: CPT

## 2023-10-13 PROCEDURE — 87086 URINE CULTURE/COLONY COUNT: CPT | Performed by: EMERGENCY MEDICINE

## 2023-10-13 PROCEDURE — 84439 ASSAY OF FREE THYROXINE: CPT | Performed by: EMERGENCY MEDICINE

## 2023-10-13 PROCEDURE — 99285 EMERGENCY DEPT VISIT HI MDM: CPT

## 2023-10-13 PROCEDURE — 84484 ASSAY OF TROPONIN QUANT: CPT | Performed by: EMERGENCY MEDICINE

## 2023-10-13 PROCEDURE — 85610 PROTHROMBIN TIME: CPT | Performed by: EMERGENCY MEDICINE

## 2023-10-13 PROCEDURE — 71045 X-RAY EXAM CHEST 1 VIEW: CPT

## 2023-10-13 PROCEDURE — 93005 ELECTROCARDIOGRAM TRACING: CPT | Performed by: EMERGENCY MEDICINE

## 2023-10-13 PROCEDURE — 63710000001 INSULIN GLARGINE PER 5 UNITS: Performed by: INTERNAL MEDICINE

## 2023-10-13 PROCEDURE — 70450 CT HEAD/BRAIN W/O DYE: CPT

## 2023-10-13 PROCEDURE — 87077 CULTURE AEROBIC IDENTIFY: CPT | Performed by: EMERGENCY MEDICINE

## 2023-10-13 PROCEDURE — 87186 SC STD MICRODIL/AGAR DIL: CPT | Performed by: EMERGENCY MEDICINE

## 2023-10-13 PROCEDURE — 81001 URINALYSIS AUTO W/SCOPE: CPT | Performed by: EMERGENCY MEDICINE

## 2023-10-13 PROCEDURE — 86901 BLOOD TYPING SEROLOGIC RH(D): CPT | Performed by: INTERNAL MEDICINE

## 2023-10-13 PROCEDURE — P9047 ALBUMIN (HUMAN), 25%, 50ML: HCPCS

## 2023-10-13 PROCEDURE — 82948 REAGENT STRIP/BLOOD GLUCOSE: CPT

## 2023-10-13 PROCEDURE — 84145 PROCALCITONIN (PCT): CPT | Performed by: EMERGENCY MEDICINE

## 2023-10-13 PROCEDURE — 87040 BLOOD CULTURE FOR BACTERIA: CPT | Performed by: INTERNAL MEDICINE

## 2023-10-13 PROCEDURE — P9612 CATHETERIZE FOR URINE SPEC: HCPCS

## 2023-10-13 PROCEDURE — 86920 COMPATIBILITY TEST SPIN: CPT

## 2023-10-13 PROCEDURE — 86850 RBC ANTIBODY SCREEN: CPT | Performed by: INTERNAL MEDICINE

## 2023-10-13 PROCEDURE — 83735 ASSAY OF MAGNESIUM: CPT | Performed by: EMERGENCY MEDICINE

## 2023-10-13 PROCEDURE — 85025 COMPLETE CBC W/AUTO DIFF WBC: CPT | Performed by: EMERGENCY MEDICINE

## 2023-10-13 PROCEDURE — 83605 ASSAY OF LACTIC ACID: CPT | Performed by: EMERGENCY MEDICINE

## 2023-10-13 PROCEDURE — 73630 X-RAY EXAM OF FOOT: CPT

## 2023-10-13 PROCEDURE — 25810000003 SODIUM CHLORIDE 0.9 % SOLUTION: Performed by: EMERGENCY MEDICINE

## 2023-10-13 PROCEDURE — 25010000002 ONDANSETRON PER 1 MG

## 2023-10-13 PROCEDURE — G0378 HOSPITAL OBSERVATION PER HR: HCPCS

## 2023-10-13 PROCEDURE — 93010 ELECTROCARDIOGRAM REPORT: CPT | Performed by: INTERNAL MEDICINE

## 2023-10-13 PROCEDURE — 25810000003 SODIUM CHLORIDE 0.9 % SOLUTION: Performed by: INTERNAL MEDICINE

## 2023-10-13 PROCEDURE — 86900 BLOOD TYPING SEROLOGIC ABO: CPT | Performed by: INTERNAL MEDICINE

## 2023-10-13 PROCEDURE — 25010000002 CEFTRIAXONE PER 250 MG: Performed by: EMERGENCY MEDICINE

## 2023-10-13 PROCEDURE — 84443 ASSAY THYROID STIM HORMONE: CPT | Performed by: EMERGENCY MEDICINE

## 2023-10-13 PROCEDURE — 87635 SARS-COV-2 COVID-19 AMP PRB: CPT | Performed by: EMERGENCY MEDICINE

## 2023-10-13 PROCEDURE — 25010000002 ALBUMIN HUMAN 25% PER 50 ML

## 2023-10-13 PROCEDURE — 83880 ASSAY OF NATRIURETIC PEPTIDE: CPT | Performed by: EMERGENCY MEDICINE

## 2023-10-13 RX ORDER — ACETAMINOPHEN 650 MG/1
650 SUPPOSITORY RECTAL EVERY 4 HOURS PRN
Status: DISCONTINUED | OUTPATIENT
Start: 2023-10-13 | End: 2023-10-26 | Stop reason: HOSPADM

## 2023-10-13 RX ORDER — POTASSIUM CHLORIDE 750 MG/1
20 TABLET, FILM COATED, EXTENDED RELEASE ORAL DAILY
Status: DISCONTINUED | OUTPATIENT
Start: 2023-10-14 | End: 2023-10-24

## 2023-10-13 RX ORDER — ATORVASTATIN CALCIUM 20 MG/1
40 TABLET, FILM COATED ORAL NIGHTLY
Status: DISCONTINUED | OUTPATIENT
Start: 2023-10-13 | End: 2023-10-26 | Stop reason: HOSPADM

## 2023-10-13 RX ORDER — POTASSIUM CHLORIDE 750 MG/1
40 TABLET, FILM COATED, EXTENDED RELEASE ORAL ONCE
Status: COMPLETED | OUTPATIENT
Start: 2023-10-13 | End: 2023-10-13

## 2023-10-13 RX ORDER — GABAPENTIN 300 MG/1
300 CAPSULE ORAL NIGHTLY
Status: DISCONTINUED | OUTPATIENT
Start: 2023-10-13 | End: 2023-10-26 | Stop reason: HOSPADM

## 2023-10-13 RX ORDER — POTASSIUM CHLORIDE 1.5 G/1.58G
20 POWDER, FOR SOLUTION ORAL DAILY
Status: DISCONTINUED | OUTPATIENT
Start: 2023-10-13 | End: 2023-10-19 | Stop reason: SDUPTHER

## 2023-10-13 RX ORDER — MIDODRINE HYDROCHLORIDE 5 MG/1
5 TABLET ORAL
Status: DISCONTINUED | OUTPATIENT
Start: 2023-10-14 | End: 2023-10-14

## 2023-10-13 RX ORDER — DIPHENOXYLATE HYDROCHLORIDE AND ATROPINE SULFATE 2.5; .025 MG/1; MG/1
1 TABLET ORAL 4 TIMES DAILY PRN
Status: DISCONTINUED | OUTPATIENT
Start: 2023-10-13 | End: 2023-10-26 | Stop reason: HOSPADM

## 2023-10-13 RX ORDER — ALBUMIN (HUMAN) 12.5 G/50ML
25 SOLUTION INTRAVENOUS ONCE
Status: DISCONTINUED | OUTPATIENT
Start: 2023-10-13 | End: 2023-10-13

## 2023-10-13 RX ORDER — ALBUMIN (HUMAN) 12.5 G/50ML
50 SOLUTION INTRAVENOUS ONCE
Qty: 200 ML | Refills: 0 | Status: COMPLETED | OUTPATIENT
Start: 2023-10-14 | End: 2023-10-14

## 2023-10-13 RX ORDER — LAMOTRIGINE 25 MG/1
1 TABLET ORAL
Status: DISCONTINUED | OUTPATIENT
Start: 2023-10-13 | End: 2023-10-15

## 2023-10-13 RX ORDER — FOLIC ACID 1 MG/1
1 TABLET ORAL DAILY
Status: DISCONTINUED | OUTPATIENT
Start: 2023-10-13 | End: 2023-10-26 | Stop reason: HOSPADM

## 2023-10-13 RX ORDER — ACETAMINOPHEN 500 MG
1000 TABLET ORAL AS NEEDED
Status: DISCONTINUED | OUTPATIENT
Start: 2023-10-13 | End: 2023-10-26 | Stop reason: HOSPADM

## 2023-10-13 RX ORDER — SODIUM CHLORIDE 9 MG/ML
40 INJECTION, SOLUTION INTRAVENOUS AS NEEDED
Status: DISCONTINUED | OUTPATIENT
Start: 2023-10-13 | End: 2023-10-26 | Stop reason: HOSPADM

## 2023-10-13 RX ORDER — ONDANSETRON 4 MG/1
4 TABLET, FILM COATED ORAL EVERY 6 HOURS PRN
Status: DISCONTINUED | OUTPATIENT
Start: 2023-10-13 | End: 2023-10-15

## 2023-10-13 RX ORDER — ACETAMINOPHEN 325 MG/1
650 TABLET ORAL EVERY 4 HOURS PRN
Status: DISCONTINUED | OUTPATIENT
Start: 2023-10-13 | End: 2023-10-26 | Stop reason: HOSPADM

## 2023-10-13 RX ORDER — MIDODRINE HYDROCHLORIDE 5 MG/1
5 TABLET ORAL ONCE
Status: COMPLETED | OUTPATIENT
Start: 2023-10-13 | End: 2023-10-13

## 2023-10-13 RX ORDER — BISACODYL 5 MG/1
5 TABLET, DELAYED RELEASE ORAL DAILY PRN
Status: DISCONTINUED | OUTPATIENT
Start: 2023-10-13 | End: 2023-10-26 | Stop reason: HOSPADM

## 2023-10-13 RX ORDER — AMOXICILLIN 250 MG
2 CAPSULE ORAL 2 TIMES DAILY
Status: DISCONTINUED | OUTPATIENT
Start: 2023-10-13 | End: 2023-10-26 | Stop reason: HOSPADM

## 2023-10-13 RX ORDER — ONDANSETRON 2 MG/ML
4 INJECTION INTRAMUSCULAR; INTRAVENOUS EVERY 6 HOURS PRN
Status: DISCONTINUED | OUTPATIENT
Start: 2023-10-13 | End: 2023-10-15

## 2023-10-13 RX ORDER — ACETAMINOPHEN 160 MG/5ML
650 SOLUTION ORAL EVERY 4 HOURS PRN
Status: DISCONTINUED | OUTPATIENT
Start: 2023-10-13 | End: 2023-10-26 | Stop reason: HOSPADM

## 2023-10-13 RX ORDER — SODIUM CHLORIDE 0.9 % (FLUSH) 0.9 %
10 SYRINGE (ML) INJECTION EVERY 12 HOURS SCHEDULED
Status: DISCONTINUED | OUTPATIENT
Start: 2023-10-13 | End: 2023-10-26 | Stop reason: HOSPADM

## 2023-10-13 RX ORDER — SODIUM CHLORIDE 0.9 % (FLUSH) 0.9 %
10 SYRINGE (ML) INJECTION AS NEEDED
Status: DISCONTINUED | OUTPATIENT
Start: 2023-10-13 | End: 2023-10-26 | Stop reason: HOSPADM

## 2023-10-13 RX ORDER — BISACODYL 10 MG
10 SUPPOSITORY, RECTAL RECTAL DAILY PRN
Status: DISCONTINUED | OUTPATIENT
Start: 2023-10-13 | End: 2023-10-26 | Stop reason: HOSPADM

## 2023-10-13 RX ORDER — DEXTROSE MONOHYDRATE 25 G/50ML
25 INJECTION, SOLUTION INTRAVENOUS
Status: DISCONTINUED | OUTPATIENT
Start: 2023-10-13 | End: 2023-10-26 | Stop reason: HOSPADM

## 2023-10-13 RX ORDER — POLYETHYLENE GLYCOL 3350 17 G/17G
17 POWDER, FOR SOLUTION ORAL DAILY PRN
Status: DISCONTINUED | OUTPATIENT
Start: 2023-10-13 | End: 2023-10-26 | Stop reason: HOSPADM

## 2023-10-13 RX ORDER — NICOTINE POLACRILEX 4 MG
15 LOZENGE BUCCAL
Status: DISCONTINUED | OUTPATIENT
Start: 2023-10-13 | End: 2023-10-26 | Stop reason: HOSPADM

## 2023-10-13 RX ORDER — LEVOTHYROXINE SODIUM 0.05 MG/1
50 TABLET ORAL
Status: DISCONTINUED | OUTPATIENT
Start: 2023-10-14 | End: 2023-10-26 | Stop reason: HOSPADM

## 2023-10-13 RX ORDER — NITROGLYCERIN 0.4 MG/1
0.4 TABLET SUBLINGUAL
Status: DISCONTINUED | OUTPATIENT
Start: 2023-10-13 | End: 2023-10-26 | Stop reason: HOSPADM

## 2023-10-13 RX ORDER — POTASSIUM CHLORIDE 750 MG/1
20 TABLET, FILM COATED, EXTENDED RELEASE ORAL ONCE
Status: COMPLETED | OUTPATIENT
Start: 2023-10-13 | End: 2023-10-13

## 2023-10-13 RX ORDER — SODIUM CHLORIDE 9 MG/ML
250 INJECTION, SOLUTION INTRAVENOUS AS NEEDED
Status: DISCONTINUED | OUTPATIENT
Start: 2023-10-13 | End: 2023-10-26 | Stop reason: HOSPADM

## 2023-10-13 RX ORDER — INSULIN LISPRO 100 [IU]/ML
2-9 INJECTION, SOLUTION INTRAVENOUS; SUBCUTANEOUS
Status: DISCONTINUED | OUTPATIENT
Start: 2023-10-13 | End: 2023-10-14

## 2023-10-13 RX ORDER — HYDROCODONE BITARTRATE AND ACETAMINOPHEN 5; 325 MG/1; MG/1
1 TABLET ORAL EVERY 4 HOURS PRN
Status: DISCONTINUED | OUTPATIENT
Start: 2023-10-13 | End: 2023-10-26 | Stop reason: HOSPADM

## 2023-10-13 RX ORDER — IBUPROFEN 600 MG/1
1 TABLET ORAL
Status: DISCONTINUED | OUTPATIENT
Start: 2023-10-13 | End: 2023-10-26 | Stop reason: HOSPADM

## 2023-10-13 RX ORDER — CHOLECALCIFEROL (VITAMIN D3) 125 MCG
1000 CAPSULE ORAL DAILY
Status: DISCONTINUED | OUTPATIENT
Start: 2023-10-13 | End: 2023-10-26 | Stop reason: HOSPADM

## 2023-10-13 RX ADMIN — FOLIC ACID 1 MG: 1 TABLET ORAL at 17:37

## 2023-10-13 RX ADMIN — Medication 1000 MCG: at 17:37

## 2023-10-13 RX ADMIN — CEFTRIAXONE 2000 MG: 2 INJECTION, POWDER, FOR SOLUTION INTRAMUSCULAR; INTRAVENOUS at 13:44

## 2023-10-13 RX ADMIN — POTASSIUM CHLORIDE 20 MEQ: 750 TABLET, EXTENDED RELEASE ORAL at 20:51

## 2023-10-13 RX ADMIN — Medication 10 ML: at 20:33

## 2023-10-13 RX ADMIN — INSULIN GLARGINE 50 UNITS: 100 INJECTION, SOLUTION SUBCUTANEOUS at 20:52

## 2023-10-13 RX ADMIN — ONDANSETRON 4 MG: 2 INJECTION INTRAMUSCULAR; INTRAVENOUS at 23:52

## 2023-10-13 RX ADMIN — POTASSIUM CHLORIDE 40 MEQ: 750 TABLET, EXTENDED RELEASE ORAL at 12:55

## 2023-10-13 RX ADMIN — SERTRALINE 150 MG: 100 TABLET, FILM COATED ORAL at 17:37

## 2023-10-13 RX ADMIN — ATORVASTATIN CALCIUM 40 MG: 20 TABLET, FILM COATED ORAL at 20:51

## 2023-10-13 RX ADMIN — POTASSIUM CHLORIDE 20 MEQ: 1.5 POWDER, FOR SOLUTION ORAL at 17:37

## 2023-10-13 RX ADMIN — MIDODRINE HYDROCHLORIDE 5 MG: 5 TABLET ORAL at 20:51

## 2023-10-13 RX ADMIN — SODIUM CHLORIDE 500 ML: 9 INJECTION, SOLUTION INTRAVENOUS at 14:30

## 2023-10-13 RX ADMIN — MUPIROCIN 1 APPLICATION: 20 OINTMENT TOPICAL at 17:37

## 2023-10-13 RX ADMIN — SODIUM CHLORIDE 250 ML: 9 INJECTION, SOLUTION INTRAVENOUS at 20:25

## 2023-10-13 RX ADMIN — ALBUMIN (HUMAN) 50 G: 0.25 INJECTION, SOLUTION INTRAVENOUS at 23:45

## 2023-10-13 NOTE — PROGRESS NOTES
Clinical Pharmacy Services: Medication History    Juana Hall is a 63 y.o. female presenting to [facility] for   Chief Complaint   Patient presents with    Weakness - Generalized       She  has a past medical history of Anemia, Anxiety and depression, Bicuspid aortic valve, Breast cancer (2004), CHF (congestive heart failure), CKD (chronic kidney disease), COVID (01/2023), Diabetes mellitus, Dialysis patient, Elevated cholesterol, Frequent UTI, Heart murmur, History of cancer chemotherapy (2005), History of hypertension (2023), History of kidney stones, History of MRSA infection (2005), History of radiation therapy, History of sepsis (2010), History of transfusion, Hyperlipidemia, Hyperthyroidism, Hypothyroidism, Kidney stone, Lymphedema of leg, Metastasis from breast cancer (2019), Neuromuscular disorder, Obesity, ANDREW on CPAP, Osteoarthrosis, Peripheral neuropathy, Radiculopathy, Rectal bleeding (08/16/2022), Thickened endometrium, Urinary incontinence, and Weakness.    Allergies as of 10/13/2023    (No Known Allergies)       Medication information was obtained from: Patient   Pharmacy and Phone Number:     Prior to Admission Medications       Prescriptions Last Dose Informant Patient Reported? Taking?    Abemaciclib (Verzenio) 100 MG tablet 10/12/2023 Self No Yes    Take 1 tablet by mouth 2 (Two) Times a Day.    acetaminophen (TYLENOL) 500 MG tablet 10/12/2023 Self Yes Yes    Take 2 tablets by mouth As Needed for Mild Pain.    atorvastatin (LIPITOR) 40 MG tablet Past Week Self, Pharmacy No Yes    TAKE ONE TABLET BY MOUTH ONCE NIGHTLY    Patient taking differently:  Take 1 tablet by mouth Every Night.    diphenoxylate-atropine (LOMOTIL) 2.5-0.025 MG per tablet Past Month Self, Pharmacy No Yes    Take 1 tablet by mouth 4 (Four) Times a Day As Needed for Diarrhea.    folic acid (FOLVITE) 1 MG tablet Past Week Self, Pharmacy No Yes    TAKE 1 TABLET BY MOUTH DAILY    Patient taking differently:  Take  1 tablet by mouth Daily.    Fulvestrant (FASLODEX) 250 MG/5ML chemo syringe Past Month Self, Pharmacy Yes Yes    Inject 0.02 mL into the appropriate muscle as directed by prescriber Every 30 (Thirty) Days. In MD office  Next due: 10/17/23    gabapentin (Neurontin) 300 MG capsule 10/12/2023 Pharmacy, Self No Yes    Take 1 capsule by mouth Every Night.    HYDROcodone-acetaminophen (NORCO) 5-325 MG per tablet  Self, Pharmacy No Yes    Take 1 tablet by mouth Every 4 (Four) Hours As Needed for Moderate Pain.    insulin detemir (Levemir FlexPen) 100 UNIT/ML injection Past Week Self, Pharmacy No Yes    Inject 50 Units under the skin into the appropriate area as directed Daily.    levothyroxine (SYNTHROID, LEVOTHROID) 50 MCG tablet Past Week Self, Pharmacy No Yes    Take 1 tablet by mouth Daily.    Patient taking differently:  Take 1 tablet by mouth Every Morning.    ondansetron (ZOFRAN) 8 MG tablet Past Week Self, Pharmacy No Yes    TAKE ONE TABLET BY MOUTH EVERY 8 HOURS AS NEEDED FOR NAUSEA OR VOMITING    Patient taking differently:  Take 1 tablet by mouth Every 8 (Eight) Hours As Needed.    potassium chloride ER (K-TAB) 20 MEQ tablet controlled-release ER tablet 10/12/2023 Pharmacy, Self Yes Yes    Take 1 tablet by mouth Daily.    sertraline (ZOLOFT) 100 MG tablet 10/12/2023 Self No Yes    TAKE 1 AND 1/2 TABLET BY MOUTH DAILY    Patient taking differently:  Take 1.5 tablets by mouth Daily.    vitamin B-12 (CYANOCOBALAMIN) 1000 MCG tablet Past Week Self Yes Yes    Take 1 tablet by mouth Daily.    HYDROcodone-acetaminophen (NORCO) 5-325 MG per tablet   No No    Take 1-2 tablets by mouth Every 4 (Four) Hours As Needed for Moderate Pain (Pain).    Patient not taking:  Reported on 10/9/2023    mupirocin (BACTROBAN) 2 % ointment  Self Yes No    Apply 1 application  topically to the appropriate area as directed Daily. Applied to fistula insertion sites for dialysis              Medication notes:     This medication list is  complete to the best of my knowledge as of 10/13/2023    Please call if questions.    Issac Rea  Medication History Technician  [phone]    10/13/2023 14:30 EDT

## 2023-10-13 NOTE — ED TRIAGE NOTES
Pt arrives via EMS from home. States that she has had increased generalized weakness since Monday. Has had 4 falls since then as well. Pt is a home dialysis pt. Last had dialysis yesterday. Reports that she was hypotensive after her treatment yesterday. Received 2L of fluids and BP improved.

## 2023-10-13 NOTE — ED PROVIDER NOTES
EMERGENCY DEPARTMENT ENCOUNTER    Room Number:  10/10  Date seen:  10/13/2023  PCP: Kiana Noriega APRN ([place])  Historian: Patient, family and EMS      HPI:  Chief Complaint: Weakness  Context: Juana Hall is a 63 y.o. female who presents to the ED via EMS for weakness and falls for the past 4 days.  Patient had 11 L removed on an outpatient paracentesis on Monday.  She states that since then she has been weak and has had 4 falls.  She states that she has not hit her head or loss consciousness she only complains of left foot pain and states her left leg is weaker than usual.  The patient does home dialysis.  She states she was last dialyzed yesterday.  She states she was hypotensive after treatment and was given 1 L of fluids with improvement.  The patient was admitted to the hospital 2 weeks ago and found to have ascites.  She does have metastatic breast cancer.  Patient also had 3 episodes of vomiting yesterday and decreased p.o. intake.  They called the patient's oncologist-Dr. Irvin who directed her to come to the emergency room for further evaluation and care.    PAST MEDICAL HISTORY  Active Ambulatory Problems     Diagnosis Date Noted    Anxiety     Depression     Diabetes type 2, controlled     Hyperlipidemia     Heart murmur     Hypertension     Post-traumatic osteoarthritis of multiple joints     Psoriasis     Radiculopathy     Acquired hypothyroidism 09/26/2016    Peripheral neuropathy 02/25/2019    Normocytic anemia 07/22/2019    History of right breast cancer 07/22/2019    Omental mass 07/22/2019    Primary malignant neoplasm of breast with metastasis 08/12/2019    Elevated serum creatinine 09/10/2019    Iron deficiency anemia 08/07/2020    Anemia in stage 3 chronic kidney disease 09/04/2020    Stage 3b chronic kidney disease 03/10/2021    Anxiety and depression     RYAN (acute kidney injury) 04/29/2021    Anemia, chronic disease 04/29/2021    Diastolic CHF, chronic 04/29/2021    Frequent UTI      Metabolic acidosis 04/29/2021    Severe malnutrition 04/30/2021    Hydronephrosis 12/16/2021    Generalized weakness 01/28/2022    COVID-19 virus detected 01/28/2022    Hypocalcemia 01/28/2022    Morbid obesity with BMI of 50.0-59.9, adult 01/28/2022    ESRD (end stage renal disease) 01/28/2022    Pancytopenia due to chemotherapy 01/28/2022    Chronic anemia 08/16/2022    Rectal bleeding 08/16/2022    Bicuspid aortic valve 08/18/2022    Complicated UTI (urinary tract infection) 01/04/2023    Acute UTI (urinary tract infection) 01/04/2023    Severe aortic stenosis 01/04/2023    Combined systolic and diastolic congestive heart failure 01/04/2023    Depression 01/04/2023    ANDREW (obstructive sleep apnea) 01/04/2023    Diastolic CHF, chronic 01/05/2023    Adverse effect of chemotherapy 01/05/2023    UTI (urinary tract infection) 01/30/2023    Hematuria 04/13/2023    Dysuria 07/19/2023    Intractable vomiting 09/28/2023     Resolved Ambulatory Problems     Diagnosis Date Noted    Nonrheumatic aortic valve stenosis 08/16/2022    Peritoneal dialysis status 01/04/2023    Hypokalemia 01/04/2023     Past Medical History:   Diagnosis Date    Anemia     Breast cancer 2004    CHF (congestive heart failure)     CKD (chronic kidney disease)     COVID 01/2023    Diabetes mellitus     Dialysis patient     Elevated cholesterol     History of cancer chemotherapy 2005    History of hypertension 2023    History of kidney stones     History of MRSA infection 2005    History of radiation therapy     History of sepsis 2010    History of transfusion     Hyperthyroidism     Hypothyroidism     Kidney stone     Lymphedema of leg     Metastasis from breast cancer 2019    Neuromuscular disorder     Obesity     ANDREW on CPAP     Osteoarthrosis     Thickened endometrium     Urinary incontinence     Weakness          REVIEW OF SYSTEMS  All systems reviewed and negative except for those discussed in HPI.       PAST SURGICAL HISTORY  Past Surgical  History:   Procedure Laterality Date    AORTIC VALVULOPLASTY  2023    ARTERIOVENOUS FISTULA/SHUNT SURGERY Left 2021    Procedure: LEFT  BRACHIOCEPALIC ARTERIOVENOUS FISTULA;  Surgeon: Dejuan Adler MD;  Location: [facility] MAIN OR;  Service: Vascular;  Laterality: Left;    BREAST RECONSTRUCTION      WITH IMPLANTS, AND REVISION, NOW REMOVED    BREAST SURGERY Bilateral 2004    breast implants removed and then replaced due to infection    CARDIAC CATHETERIZATION N/A 2022    Procedure: CORONARY ANGIOGRAPHY;  Surgeon: Luis Rivers MD;  Location:  [facility];  Service: Cardiology;  Laterality: N/A;    CARDIAC CATHETERIZATION N/A 2022    Procedure: Right Heart Cath;  Surgeon: Luis Rivers MD;  Location:  [facility];  Service: Cardiology;  Laterality: N/A;    CARDIAC CATHETERIZATION N/A 01/10/2023    Procedure: Valvuloplasty;  Surgeon: Luis Rivers MD;  Location:  [facility];  Service: Cardiovascular;  Laterality: N/A;  01/10/2023    CARDIAC CATHETERIZATION N/A 01/10/2023    Procedure: Aortic root aortogram;  Surgeon: Luis Rivers MD;  Location:  [facility];  Service: Cardiovascular;  Laterality: N/A;    CATARACT EXTRACTION WITH INTRAOCULAR LENS IMPLANT Bilateral      SECTION  1987     SECTION  1987    CYSTOSCOPY W/ URETERAL STENT PLACEMENT      CYSTOSCOPY W/ URETERAL STENT PLACEMENT Left 2021    Procedure: CYSTOSCOPY KEFT RETROGRADE PYELOGRAM  LEFT  URETERAL STENT PLACEMENT;  Surgeon: Leodan Mckee Jr., MD;  Location: [facility] MAIN OR;  Service: Urology;  Laterality: Left;    CYSTOSCOPY W/ URETERAL STENT PLACEMENT Left 03/10/2022    Procedure: CYSTOSCOPY AND LEFT URETERAL STENT EXCHANGE, RETROGRADE PYELOGRAM;  Surgeon: Leodan Mckee Jr., MD;  Location: [facility] MAIN OR;  Service: Urology;  Laterality: Left;    CYSTOSCOPY W/  URETERAL STENT PLACEMENT Left 2023    Procedure: CYSTOSCOPY WITH LEFT URETERAL STENT PLACEMENT, RETROGRADE PYELOGRAM, CLOT EVACUATION, BLADDER FULGARATION;  Surgeon: Leodan Mckee Jr., MD;  [facility];  Service: Urology;  Laterality: Left;    D & C HYSTEROSCOPY N/A 2017    Procedure: DILATATION AND CURETTAGE, HYSTEROSCOPY ;  Surgeon: Cherie Oliveros MD;  Location:  [facility];  Service:     D & C HYSTEROSCOPY N/A 2019    Procedure: DILATATION AND CURETTAGE HYSTEROSCOPY/POLYPECTOMY;  Surgeon: Cherie Oliveros MD;  Location:  [facility];  Service: Gynecology    D & C HYSTEROSCOPY ENDOMETRIAL ABLATION N/A 2023    Procedure: DILATATION AND CURETTAGE;  Surgeon: Ina Marcos MD;  Location: [facility] MAIN OR;  Service: Obstetrics/Gynecology;  Laterality: N/A;    ENDOSCOPY      INSERTION AND REMOVAL HEMODIALYSIS CATHETER Right 2021    INSERTION HEMODIALYSIS CATHETER Right 2021    Procedure: HEMODIALYSIS CATHETER INSERTION;  Surgeon: Clint Hernández MD;  Location: [facility] MAIN OR;  Service: Vascular;  Laterality: Right;    LYMPH NODE BIOPSY      MASTECTOMY Bilateral     VENOUS ACCESS DEVICE (PORT) INSERTION AND REMOVAL           FAMILY HISTORY  Family History   Problem Relation Age of Onset    Heart attack Mother 72    Coronary artery disease Mother         Mother  from MI at 72    Diabetes Mother     Breast cancer Mother     Hyperlipidemia Mother     Arthritis Mother     Cancer Mother     Heart attack Father 74    Aortic aneurysm Father         Father with ruptured thoracic aortic aneurysm    Coronary artery disease Father         Father  from MI at 74    Heart disease Father     Hyperlipidemia Father     Arthritis Father     Heart attack Maternal Grandmother 76    Coronary artery disease Maternal Grandmother         MGM deceeased from MI at age 76    Malig Hyperthermia Neg Hx          SOCIAL HISTORY  Social History     Socioeconomic History     Marital status:      Spouse name: Rk   Tobacco Use    Smoking status: Never    Smokeless tobacco: Never   Vaping Use    Vaping Use: Never used   Substance and Sexual Activity    Alcohol use: No    Drug use: Never    Sexual activity: Yes     Partners: Male     Birth control/protection: Post-menopausal         ALLERGIES  Patient has no known allergies.      PHYSICAL EXAM  ED Triage Vitals   Temp Heart Rate Resp BP SpO2   10/13/23 1014 10/13/23 1013 10/13/23 1013 10/13/23 1013 --   97.8 °F (36.6 °C) 64 18 (!) 97/37       Temp src Heart Rate Source Patient Position BP Location FiO2 (%)   10/13/23 1014 10/13/23 1013 10/13/23 1013 -- --   Tympanic Monitor Lying         Physical Exam      GENERAL: 63-year-old female in no acute distress  HENT: NCAT: nares patent: Neck supple  EYES: no scleral icterus: Pale conjunctiva  CV: regular rhythm, normal rate  RESPIRATORY: normal effort  ABDOMEN: soft, NTND: Bowel sounds positive  MUSCULOSKELETAL: 4+ pedal edema bilaterally with history of lymphedema: The patient has abrasions to the medial aspect of her second third fourth and fifth toes on her left foot.  They state that they noticed this after one of her falls.  They deny any compression of her foot.  NEURO: alert and oriented x3 with mild weakness in left leg which she states is chronic for her but worse than usual  PSYCH:  calm, cooperative  SKIN: warm, dry    Vital signs and nursing notes reviewed.      LAB RESULTS  Recent Results (from the past 24 hour(s))   COVID-19,[facility] IN-HOUSE CEPHEID/JF NP SWAB IN TRANSPORT MEDIA 8-12 HR TAT - Swab, Nasopharynx    Collection Time: 10/13/23 10:56 AM    Specimen: Nasopharynx; Swab   Result Value Ref Range    COVID19 Not Detected Not Detected - Ref. Range   ECG 12 Lead Electrolyte Imbalance    Collection Time: 10/13/23 10:56 AM   Result Value Ref Range    QT Interval 384 ms    QTC Interval 465 ms   Comprehensive Metabolic Panel    Collection Time: 10/13/23 11:08 AM     Specimen: Blood   Result Value Ref Range    Glucose 156 (H) 65 - 99 mg/dL    BUN 19 8 - 23 mg/dL    Creatinine 4.84 (H) 0.57 - 1.00 mg/dL    Sodium 136 136 - 145 mmol/L    Potassium 2.9 (L) 3.5 - 5.2 mmol/L    Chloride 96 (L) 98 - 107 mmol/L    CO2 26.4 22.0 - 29.0 mmol/L    Calcium 8.4 (L) 8.6 - 10.5 mg/dL    Total Protein 5.7 (L) 6.0 - 8.5 g/dL    Albumin 1.9 (L) 3.5 - 5.2 g/dL    ALT (SGPT) 14 1 - 33 U/L    AST (SGOT) 15 1 - 32 U/L    Alkaline Phosphatase 115 39 - 117 U/L    Total Bilirubin 0.4 0.0 - 1.2 mg/dL    Globulin 3.8 gm/dL    A/G Ratio 0.5 g/dL    BUN/Creatinine Ratio 3.9 (L) 7.0 - 25.0    Anion Gap 13.6 5.0 - 15.0 mmol/L    eGFR 9.4 (L) >60.0 mL/min/1.73   Protime-INR    Collection Time: 10/13/23 11:08 AM    Specimen: Blood   Result Value Ref Range    Protime 17.0 (H) 11.7 - 14.2 Seconds    INR 1.36 (H) 0.90 - 1.10   BNP    Collection Time: 10/13/23 11:08 AM    Specimen: Blood   Result Value Ref Range    proBNP 21,914.0 (H) 0.0 - 900.0 pg/mL   High Sensitivity Troponin T    Collection Time: 10/13/23 11:08 AM    Specimen: Blood   Result Value Ref Range    HS Troponin T 51 (H) <10 ng/L   Magnesium    Collection Time: 10/13/23 11:08 AM    Specimen: Blood   Result Value Ref Range    Magnesium 1.7 1.6 - 2.4 mg/dL   TSH    Collection Time: 10/13/23 11:08 AM    Specimen: Blood   Result Value Ref Range    TSH 2.810 0.270 - 4.200 uIU/mL   T4, Free    Collection Time: 10/13/23 11:08 AM    Specimen: Blood   Result Value Ref Range    Free T4 0.72 (L) 0.93 - 1.70 ng/dL   CBC Auto Differential    Collection Time: 10/13/23 11:08 AM    Specimen: Blood   Result Value Ref Range    WBC 5.84 3.40 - 10.80 10*3/mm3    RBC 2.75 (L) 3.77 - 5.28 10*6/mm3    Hemoglobin 8.7 (L) 12.0 - 15.9 g/dL    Hematocrit 26.2 (L) 34.0 - 46.6 %    MCV 95.3 79.0 - 97.0 fL    MCH 31.6 26.6 - 33.0 pg    MCHC 33.2 31.5 - 35.7 g/dL    RDW 15.3 12.3 - 15.4 %    RDW-SD 51.7 37.0 - 54.0 fl    MPV 10.6 6.0 - 12.0 fL    Platelets 123 (L) 140 - 450  10*3/mm3    Neutrophil % 81.8 (H) 42.7 - 76.0 %    Lymphocyte % 9.1 (L) 19.6 - 45.3 %    Monocyte % 6.8 5.0 - 12.0 %    Eosinophil % 0.3 0.3 - 6.2 %    Basophil % 0.5 0.0 - 1.5 %    Immature Grans % 1.5 (H) 0.0 - 0.5 %    Neutrophils, Absolute 4.77 1.70 - 7.00 10*3/mm3    Lymphocytes, Absolute 0.53 (L) 0.70 - 3.10 10*3/mm3    Monocytes, Absolute 0.40 0.10 - 0.90 10*3/mm3    Eosinophils, Absolute 0.02 0.00 - 0.40 10*3/mm3    Basophils, Absolute 0.03 0.00 - 0.20 10*3/mm3    Immature Grans, Absolute 0.09 (H) 0.00 - 0.05 10*3/mm3    nRBC 0.2 0.0 - 0.2 /100 WBC   Urinalysis With Microscopic If Indicated (No Culture) - Straight Cath    Collection Time: 10/13/23 12:10 PM    Specimen: Straight Cath; Urine   Result Value Ref Range    Color, UA Other (A) Yellow, Straw    Appearance, UA Turbid (A) Clear    pH, UA 7.0 5.0 - 8.0    Specific Gravity, UA 1.025 1.005 - 1.030    Glucose, UA Negative Negative    Ketones, UA Negative Negative    Bilirubin, UA Negative Negative    Blood, UA Moderate (2+) (A) Negative    Protein, UA >=300 mg/dL (3+) (A) Negative    Leuk Esterase, UA Small (1+) (A) Negative    Nitrite, UA Positive (A) Negative    Urobilinogen, UA 0.2 E.U./dL 0.2 - 1.0 E.U./dL   Urinalysis, Microscopic Only - Straight Cath    Collection Time: 10/13/23 12:10 PM    Specimen: Straight Cath; Urine   Result Value Ref Range    RBC, UA Unable to determine due to loaded field (A) None Seen, 0-2 /HPF    WBC, UA Too Numerous to Count (A) None Seen, 0-2 /HPF    Bacteria, UA Unable to determine due to loaded field (A) None Seen /HPF    Squamous Epithelial Cells, UA Unable to determine due to loaded field (A) None Seen, 0-2 /HPF    Hyaline Casts, UA Unable to determine due to loaded field None Seen /LPF    Methodology Automated Microscopy    High Sensitivity Troponin T 2Hr    Collection Time: 10/13/23 12:59 PM    Specimen: Blood   Result Value Ref Range    HS Troponin T 51 (H) <10 ng/L    Troponin T Delta 0 >=-4 - <+4 ng/L    Procalcitonin    Collection Time: 10/13/23 12:59 PM    Specimen: Blood   Result Value Ref Range    Procalcitonin 3.83 (H) 0.00 - 0.25 ng/mL       Ordered the above labs and reviewed the results.        RADIOLOGY  XR Chest 1 View, XR Foot 3+ View Left    Result Date: 10/13/2023  XR FOOT 3+ VW LEFT-, XR CHEST 1 VW-  HISTORY: 63-year-old female status post multiple falls in the last few days.  FINDINGS: LEFT FOOT: There are acute/subacute appearing fractures of the heads of the 3rd, 4th, and 5th metatarsals. There is possibly a fracture at the base of the 5th proximal phalanx. There is soft tissue swelling throughout most of the forefoot and the ventral aspect of the midfoot as well. There is contour deformity at the proximal metadiaphysis of the 4th metatarsal which may be related to an old fracture, but an acute fracture cannot be excluded. In addition, there is an ossific density between the frontal aspect of the talus and the navicular bone which may represent an accessory ossicle, but if there is focal tenderness in this region, this may represent an avulsion fragment.  CHEST: The heart is enlarged. There is no CHF. There is a small airspace opacity at the medial aspect of the right lower lobe which is partly obscured by EKG wires. A small pneumonia cannot be excluded. Repeat frontal radiograph is suggested.      CT Head Without Contrast    Result Date: 10/13/2023  EMERGENCY CT SCAN OF THE HEAD WITHOUT CONTRAST ON 10/13/2023  CLINICAL HISTORY: Patient has weakness and has had multiple falls over the last 4 days.  TECHNIQUE: Spiral CT images were obtained from the base of the skull to the vertex without intravenous contrast. The images were reformatted and are submitted in 3 mm thick axial, sagittal and coronal CT sections with brain algorithm and 2 mm thick axial CT sections with high-resolution bone algorithm.  There are no prior head CTs for comparison.  FINDINGS: There is some mild low-density in the  periventricular white matter consistent with mild small vessel disease. The remainder of the brain parenchyma is normal in attenuation. The ventricles are normal in size. I see no focal mass effect. There is no midline shift. No extra-axial fluid collections are identified and there is no evidence of acute intracranial hemorrhage. No acute skull fracture is identified. The calvarium and skull base are normal in appearance. Paranasal sinuses, mastoid air cells and middle ear cavities are clear.       No acute intracranial abnormality is identified. Other than mild small vessel disease in the cerebral white matter, the remainder the head CT is within normal limits.  Radiation dose reduction techniques were utilized, including automated exposure control and exposure modulation based on body size.        Ordered the above noted radiological studies. Reviewed by me in PACS.            PROCEDURES  Procedures          MEDICATIONS GIVEN IN ER  Medications   sodium chloride 0.9 % flush 10 mL (has no administration in time range)   sodium chloride 0.9 % bolus 500 mL (500 mL Intravenous New Bag 10/13/23 1430)   potassium chloride (K-DUR,KLOR-CON) ER tablet 40 mEq (40 mEq Oral Given 10/13/23 1255)   cefTRIAXone (ROCEPHIN) 2,000 mg in sodium chloride 0.9 % 100 mL IVPB-VTB (0 mg Intravenous Stopped 10/13/23 1429)             MEDICAL DECISION MAKING, PROGRESS, and CONSULTS    All labs have been independently reviewed by me.  All radiology studies have been reviewed by me and I have also reviewed the radiology report.   EKG's independently viewed and interpreted by me.  Discussion below represents my analysis of pertinent findings related to patient's condition, differential diagnosis, treatment plan and final disposition.      Additional sources:  - Discussed/ obtained information from independent historians: Patient's  is here who states that he has been given her IV fluids with her home dialysis for the past several days  but despite this she is still been so weak that she is fallen.  He states that he has injured his back lifting her up and is now no longer to take care of her at home    - External (non-ED) record review: I reviewed the patient's admission from 9/28 through 9/30 where she was seen for intractable nausea and vomiting.  Patient was found to have ascites.  She also required 1 unit of packed red blood cells.    - Chronic or social conditions impacting care: Patient was at home with her     - Shared decision making: After shared decision-making discussion to myself, the patient and her  we agree she needs to admitted to the hospital for further evaluation and care      Orders placed during this visit:  Orders Placed This Encounter   Procedures    COVID PRE-OP / PRE-PROCEDURE SCREENING ORDER (NO ISOLATION) - Swab, Nasopharynx    COVID-19,[facility] IN-HOUSE CEPHEID/[facility] NP SWAB IN TRANSPORT MEDIA 8-12 HR TAT - Swab, Nasopharynx    Urine Culture - Urine,    XR Chest 1 View    CT Head Without Contrast    XR Foot 3+ View Left    Comprehensive Metabolic Panel    Protime-INR    Urinalysis With Microscopic If Indicated (No Culture) - Urine, Clean Catch    BNP    High Sensitivity Troponin T    Magnesium    TSH    T4, Free    CBC Auto Differential    High Sensitivity Troponin T 2Hr    Urinalysis, Microscopic Only - Urine, Clean Catch    Lactic Acid, Plasma    Procalcitonin    Monitor Blood Pressure    Pulse Oximetry, Continuous    Obtain & Apply The Following- Lower extremity; Post-op shoe    Code Status and Medical Interventions:    [facility] (on-call MD unless specified) Details    Nephrology (on -call MD unless specified)    ECG 12 Lead Electrolyte Imbalance    Insert Peripheral IV    Initiate Observation Status    CBC & Differential         Differential diagnosis:  My differential diagnosis includes but is not limited to acute on chronic renal failure, metabolic acidosis, electrolyte abnormality, orthostatic hypotension,  volume depletion, sepsis or stroke      Independent interpretation of labs, radiology studies, and discussions with consultants:  ED Course as of 10/13/23 1450   Fri Oct 13, 2023   1106 The patient's current blood pressure is 97/37 which the  states is good for her this week.  I will obtain labs, urinalysis, EKG and head CT for further evaluation. [GP]   1107 EKG    EKG time: 1056  Rhythm/Rate: Normal sinus rhythm at 88  No Acute Ischemia  Non-Specific ST-T changes    Similar compared to prior on 9/29/2023    Interpreted Contemporaneously by me.  Independently viewed by me     [GP]   1200 I discussed the patient's head CT with Dr. Irwin from radiology.  He states it is negative acute.  The patient's potassium is 2.9.  Her creatinine is 4.8 with her history of end-stage renal disease.  I will give her a dose of oral potassium. [GP]   1206 The patient's proBNP is chronically elevated.  Her troponin is 51.  I think this is due to her kidney disease.  Her EKG is nonischemic and she has no chest pain.  We will check a second troponin. [GP]   1207 Patient's hemoglobin is 8.7 which is up from 7.82 weeks ago. [GP]   1213 Her creatinine is near her baseline. [GP]   1328 The patient does have TNTC in her urine.  She is afebrile with a normal white count.  I will add a lactate and procalcitonin and send off a urine culture.  I will give her a dose of IV antibiotics.  Her blood pressure is now 90.  I will give her a fluid bolus. [GP]   1347 My independent interpretation patient's chest x-ray is chronic changes. [GP]   1347 Independent interpretation of the patient's left foot film is subacute fractures of the third fourth and fifth metatarsal heads.  I will place the patient in a postop shoe. [GP]   1400 On repeat evaluation the patient's blood pressure is 88.  She is awake and oriented x3.  She has not received her IV fluid bolus yet.  I advised her and her  she will need to be admitted to the hospital for further  evaluation and care.  I believe when they took the 11 L off on Monday during paracentesis the patient has been hypokalemic since then despite the patient's  giving her extra fluids on dialysis.  They understand and agree with the plan. [GP]   1401 I also advised on the patient's broken foot and that we will place a postop shoe and have orthopedics here in the hospital. [GP]   1412 Discussed the case with Dr. Pete from [facility].  He is asked me to consult nephrology and he will admit the patient to the hospital for further evaluation and care. [GP]   1450 I have placed a call to nephrology and awaiting callback [GP]      ED Course User Index  [GP] Howard Guzman MD               DIAGNOSIS  Final diagnoses:   Hypotension, unspecified hypotension type   Acute UTI   End-stage renal disease on hemodialysis   Other fracture of left foot, initial encounter for closed fracture         DISPOSITION  ADMISSION    Discussed treatment plan and reason for admission with pt/family and admitting physician.  Pt/family voiced understanding of the plan for admission for further testing/treatment as needed.            Latest Documented Vital Signs:  As of 14:50 EDT  BP- 97/59 HR- 87 Temp- 97.8 °F (36.6 °C) (Tympanic) O2 sat- 96%--      --------------------  Please note that portions of this were completed with a voice recognition program.       Note Disclaimer: At [facility], we believe that sharing information builds trust and better relationships. You are receiving this note because you are receiving care at [facility] or recently visited. It is possible you will see health information before a provider has talked with you about it. This kind of information can be easy to misunderstand. To help you fully understand what it means for your health, we urge you to discuss this note with your provider.             Howard Guzman MD  10/13/23 9423

## 2023-10-13 NOTE — ED NOTES
"Nursing report ED to floor  Juana Hall  63 y.o.  female    HPI :   Chief Complaint   Patient presents with    Weakness - Generalized       Admitting doctor:   Moose Pete MD    Admitting diagnosis:   The primary encounter diagnosis was Hypotension, unspecified hypotension type. Diagnoses of Acute UTI, End-stage renal disease on hemodialysis, and Other fracture of left foot, initial encounter for closed fracture were also pertinent to this visit.    Code status:   Current Code Status       Date Active Code Status Order ID Comments User Context       Prior            Allergies:   Patient has no known allergies.    Isolation:   Contact Spore    Intake and Output    Intake/Output Summary (Last 24 hours) at 10/13/2023 1434  Last data filed at 10/13/2023 1429  Gross per 24 hour   Intake 100 ml   Output --   Net 100 ml       Weight:       10/13/23  1251   Weight: (!) 154 kg (340 lb)       Most recent vitals:   Vitals:    10/13/23 1111 10/13/23 1231 10/13/23 1251 10/13/23 1301   BP:  98/57  90/53   Patient Position:  Lying  Lying   Pulse: 87 87  93   Resp:  16  16   Temp:       TempSrc:       SpO2: 97% 99%  100%   Weight:   (!) 154 kg (340 lb)    Height:   170.2 cm (67\")        Active LDAs/IV Access:   Lines, Drains & Airways       Active LDAs       Name Placement date Placement time Site Days    Peripheral IV 10/13/23 1108 Right Antecubital 10/13/23  1108  Antecubital  less than 1                    Labs (abnormal labs have a star):   Labs Reviewed   COMPREHENSIVE METABOLIC PANEL - Abnormal; Notable for the following components:       Result Value    Glucose 156 (*)     Creatinine 4.84 (*)     Potassium 2.9 (*)     Chloride 96 (*)     Calcium 8.4 (*)     Total Protein 5.7 (*)     Albumin 1.9 (*)     BUN/Creatinine Ratio 3.9 (*)     eGFR 9.4 (*)     All other components within normal limits    Narrative:     GFR Normal >60  Chronic Kidney Disease <60  Kidney Failure <15     PROTIME-INR - Abnormal; Notable for the " following components:    Protime 17.0 (*)     INR 1.36 (*)     All other components within normal limits   URINALYSIS W/ MICROSCOPIC IF INDICATED (NO CULTURE) - Abnormal; Notable for the following components:    Color, UA Other (*)     Appearance, UA Turbid (*)     Blood, UA Moderate (2+) (*)     Protein, UA >=300 mg/dL (3+) (*)     Leuk Esterase, UA Small (1+) (*)     Nitrite, UA Positive (*)     All other components within normal limits   BNP (IN-HOUSE) - Abnormal; Notable for the following components:    proBNP 21,914.0 (*)     All other components within normal limits    Narrative:     This assay is used as an aid in the diagnosis of individuals suspected of having heart failure. It can be used as an aid in the diagnosis of acute decompensated heart failure (ADHF) in patients presenting with signs and symptoms of ADHF to the emergency department (ED). In addition, NT-proBNP of <300 pg/mL indicates ADHF is not likely.    Age Range Result Interpretation  NT-proBNP Concentration (pg/mL:      <50             Positive            >450                   Gray                 300-450                    Negative             <300    50-75           Positive            >900                  Gray                300-900                  Negative            <300      >75             Positive            >1800                  Gray                300-1800                  Negative            <300   TROPONIN - Abnormal; Notable for the following components:    HS Troponin T 51 (*)     All other components within normal limits    Narrative:     High Sensitive Troponin T Reference Range:  <10.0 ng/L- Negative Female for AMI  <15.0 ng/L- Negative Male for AMI  >=10 - Abnormal Female indicating possible myocardial injury.  >=15 - Abnormal Male indicating possible myocardial injury.   Clinicians would have to utilize clinical acumen, EKG, Troponin, and serial changes to determine if it is an Acute Myocardial Infarction or myocardial  injury due to an underlying chronic condition.        T4, FREE - Abnormal; Notable for the following components:    Free T4 0.72 (*)     All other components within normal limits    Narrative:     Results may be falsely increased if patient taking Biotin.     CBC WITH AUTO DIFFERENTIAL - Abnormal; Notable for the following components:    RBC 2.75 (*)     Hemoglobin 8.7 (*)     Hematocrit 26.2 (*)     Platelets 123 (*)     Neutrophil % 81.8 (*)     Lymphocyte % 9.1 (*)     Immature Grans % 1.5 (*)     Lymphocytes, Absolute 0.53 (*)     Immature Grans, Absolute 0.09 (*)     All other components within normal limits   HIGH SENSITIVITIY TROPONIN T 2HR - Abnormal; Notable for the following components:    HS Troponin T 51 (*)     All other components within normal limits    Narrative:     High Sensitive Troponin T Reference Range:  <10.0 ng/L- Negative Female for AMI  <15.0 ng/L- Negative Male for AMI  >=10 - Abnormal Female indicating possible myocardial injury.  >=15 - Abnormal Male indicating possible myocardial injury.   Clinicians would have to utilize clinical acumen, EKG, Troponin, and serial changes to determine if it is an Acute Myocardial Infarction or myocardial injury due to an underlying chronic condition.        URINALYSIS, MICROSCOPIC ONLY - Abnormal; Notable for the following components:    RBC, UA Unable to determine due to loaded field (*)     WBC, UA Too Numerous to Count (*)     Bacteria, UA Unable to determine due to loaded field (*)     Squamous Epithelial Cells, UA Unable to determine due to loaded field (*)     All other components within normal limits   PROCALCITONIN - Abnormal; Notable for the following components:    Procalcitonin 3.83 (*)     All other components within normal limits    Narrative:     As a Marker for Sepsis (Non-Neonates):    1. <0.5 ng/mL represents a low risk of severe sepsis and/or septic shock.  2. >2 ng/mL represents a high risk of severe sepsis and/or septic shock.    As  "a Marker for Lower Respiratory Tract Infections that require antibiotic therapy:    PCT on Admission    Antibiotic Therapy       6-12 Hrs later    >0.5                Strongly Recommended  >0.25 - <0.5        Recommended   0.1 - 0.25          Discouraged              Remeasure/reassess PCT  <0.1                Strongly Discouraged     Remeasure/reassess PCT    As 28 day mortality risk marker: \"Change in Procalcitonin Result\" (>80% or <=80%) if Day 0 (or Day 1) and Day 4 values are available. Refer to http://[email or web address]ologiesMer[facility] – [facility]ettaPublishThispct-[email or web address].com    Change in PCT <=80%  A decrease of PCT levels below or equal to 80% defines a positive change in PCT test result representing a higher risk for 28-day all-cause mortality of patients diagnosed with severe sepsis for septic shock.    Change in PCT >80%  A decrease of PCT levels of more than 80% defines a negative change in PCT result representing a lower risk for 28-day all-cause mortality of patients diagnosed with severe sepsis or septic shock.      COVID-19,[facility] HAY IN-HOUSE CEPHEID/JF, NP SWAB IN TRANSPORT MEDIA 8-12 HR TAT - Normal    Narrative:     Fact sheet for providers: https://www.fda.gov/media/992246/download     Fact sheet for patients: https://www.fda.gov/media/561641/download   MAGNESIUM - Normal   TSH - Normal   COVID PRE-OP / PRE-PROCEDURE SCREENING ORDER (NO ISOLATION)    Narrative:     The following orders were created for panel order COVID PRE-OP / PRE-PROCEDURE SCREENING ORDER (NO ISOLATION) - Swab, Nasopharynx.  Procedure                               Abnormality         Status                     ---------                               -----------         ------                     COVID-19,[facility] HAY IN-HOUSE...[[id number]]  Normal              Final result                 Please view results for these tests on the individual orders.   URINE CULTURE   LACTIC ACID, PLASMA   CBC AND DIFFERENTIAL    Narrative:     The following orders were created for panel order " CBC & Differential.  Procedure                               Abnormality         Status                     ---------                               -----------         ------                     CBC Auto Differential[[id number]]        Abnormal            Final result                 Please view results for these tests on the individual orders.       EKG:   ECG 12 Lead Electrolyte Imbalance   Final Result   HEART RATE= 88  bpm   RR Interval= 682  ms   MT Interval= 198  ms   P Horizontal Axis= -14  deg   P Front Axis= 65  deg   QRSD Interval= 105  ms   QT Interval= 384  ms   QTcB= 465  ms   QRS Axis= 50  deg   T Wave Axis= 252  deg   - BORDERLINE ECG -   Sinus rhythm   Borderline repolarization abnormality   NSR is new   Electronically Signed By: Chantell Tucker ([facility]) 13-Oct-2023 13:31:54   Date and Time of Study: 2023-10-13 10:56:32          Meds given in ED:   Medications   sodium chloride 0.9 % flush 10 mL (has no administration in time range)   sodium chloride 0.9 % bolus 500 mL (500 mL Intravenous New Bag 10/13/23 1430)   potassium chloride (K-DUR,KLOR-CON) ER tablet 40 mEq (40 mEq Oral Given 10/13/23 1255)   cefTRIAXone (ROCEPHIN) 2,000 mg in sodium chloride 0.9 % 100 mL IVPB-VTB (0 mg Intravenous Stopped 10/13/23 1429)       Imaging results:  CT Head Without Contrast    Result Date: 10/13/2023   No acute intracranial abnormality is identified. Other than mild small vessel disease in the cerebral white matter, the remainder the head CT is within normal limits.  Radiation dose reduction techniques were utilized, including automated exposure control and exposure modulation based on body size.        Ambulatory status:   - walker    Social issues:   Social History     Socioeconomic History    Marital status:      Spouse name: Rk   Tobacco Use    Smoking status: Never    Smokeless tobacco: Never   Vaping Use    Vaping Use: Never used   Substance and Sexual Activity    Alcohol use: No    Drug use: Never     Sexual activity: Yes     Partners: Male     Birth control/protection: Post-menopausal       NIH Stroke Scale:       Eveline Garcia RN  10/13/23 14:34 EDT     Call [phone] with questions

## 2023-10-13 NOTE — TELEPHONE ENCOUNTER
Called the patients  who paints a different picture then the patient did yesterday. He states she fell again last night and he has pulled his back out and with this continuing to happen he cannot get her up. He states she is very weak and placed emphasis on that. I let him know that based off these findings we would recommend that she be evaluated in the ER. He stated he would be calling for EMS to pick her up. He v/u.

## 2023-10-13 NOTE — H&P
Internal medicine history and physical  INTERNAL MEDICINE   [facility]       Patient Identification:  Name: Juana Hall  Age: 63 y.o.  Sex: female  :  1960  MRN: [id number]                   Primary Care Physician: Kiana Noriega APRN ([place])                               Date of admission:10/13/2023    Chief Complaint: Increased generalized weakness since Monday and multiple falls since    History of Present Illness:   Patient is a 63-year-old female who has complicated past medical history including morbid obesity, history of breast cancer with history of radiation mastectomy and chemotherapy, end-stage renal disease on hemodialysis at home and dialyze herself via left arm AV fistula 5 days a week with run time of 2 hours with last dialysis yesterday, history of recurrent urinary tract infection hypothyroidism bilateral lower extremity lymphedema obesity and obstructive sleep apnea who has been followed by hematology oncology service and was hospitalized for 2 days for intractable nausea and vomiting.  During that hospitalization patient was noted to have large amount of ascites as well as stool studies positive for C. difficile toxin but negative for antigen.  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  Patient was also noted to be anemic and did receive a unit of packed RBC and was discharged in stable condition.  Patient was then subsequently arranged to have paracentesis which was performed on on 10/9/2023 and she has 11 L of fluid removed.  According to the patient since then she has progressive weakness dizziness and multiple falls.  She also had injured multiple toes on her left foot.  She continues to do her dialysis and her last dialysis was yesterday.  Today she called Dr. Irvin's office for her symptoms since her paracentesis and was told to go to the emergency room for further evaluation.  EMS was called and  she was noted to be hypotensive and received 2 L of fluid in route to the emergency room.  Work-up here did reveal abnormal urinalysis with suspicion of UTI and elevated lactic acid level the patient does not have usual symptoms of UTI such as foul-smelling urine or burning in urination.  Patient still makes some urine.  Patient received IV fluid boluses in the emergency room with initial improvement in her blood pressure but her blood pressure on the floor continue to remain low.  Patient is currently receiving another bolus of fluid while awaiting IV albumin to be administered.  She is mentally very clear and denies any confusion disorientation.  She does have some nausea.      Past Medical History:  Past Medical History:   Diagnosis Date    Anemia     Anxiety and depression     Bicuspid aortic valve     had surgery    Breast cancer 2004    RIGHT, HAD NEELA. 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    History of sepsis 2010    KIDNEY STONE    History of transfusion     no reaction    Hyperlipidemia     Hyperthyroidism     Hypothyroidism     Kidney stone     CURRENT LEFT-DEC 2021    Lymphedema of leg     LEFT    Metastasis from breast cancer 2019    STOMACH-OMENTUM    Neuromuscular disorder     Obesity     ANDREW on CPAP     CPAP    Osteoarthrosis     Peripheral neuropathy     FEET BILAT    Radiculopathy     Rectal bleeding 08/16/2022    Thickened endometrium     Urinary incontinence     wears  pads    Weakness     BILAT LEGS-WITH ANY AMT OF TIME     Past Surgical History:  Past Surgical History:   Procedure Laterality Date    AORTIC VALVULOPLASTY  2023    ARTERIOVENOUS FISTULA/SHUNT SURGERY Left 2021    Procedure: LEFT  BRACHIOCEPALIC ARTERIOVENOUS FISTULA;  Surgeon: Dejuan Adler MD;  Location: [facility] MAIN OR;  Service: Vascular;  Laterality: Left;    BREAST RECONSTRUCTION      WITH IMPLANTS, AND REVISION, NOW REMOVED    BREAST SURGERY Bilateral     breast implants removed and then replaced due to infection    CARDIAC CATHETERIZATION N/A 2022    Procedure: CORONARY ANGIOGRAPHY;  Surgeon: Luis Rivers MD;  Location:  [facility] CATH INVASIVE [facility]ATION;  Service: Cardiology;  Laterality: N/A;    CARDIAC CATHETERIZATION N/A 2022    Procedure: Right Heart Cath;  Surgeon: Luis Rivers MD;  Location:  [facility] CATH [facility]VASIVE [facility];  Service: Cardiology;  Laterality: N/A;    CARDIAC CATHETERIZATION N/A 01/10/2023    Procedure: Valvuloplasty;  Surgeon: Luis Rivers MD;  [facility];  Service: Cardiovascular;  Laterality: N/A;  01/10/2023    CARDIAC CATHETERIZATION N/A 01/10/2023    Procedure: Aortic root aortogram;  Surgeon: Luis Rivers MD;  Location:  [facility] CATH INVASIVE LOC[facility];  Service: Cardiovascular;  Laterality: N/A;    CATARACT EXTRACTION WITH INTRAOCULAR LENS IMPLANT Bilateral      SECTION  1987     SECTION  1987    CYSTOSCOPY W/ URETERAL STENT PLACEMENT      CYSTOSCOPY W/ URETERAL STENT PLACEMENT Left 2021    Procedure: CYSTOSCOPY KEFT RETROGRADE PYELOGRAM  LEFT  URETERAL STENT PLACEMENT;  Surgeon: Leodan Mckee Jr., MD;  Location: [facility] MAIN OR;  Service: Urology;  Laterality: Left;    CYSTOSCOPY W/ URETERAL STENT PLACEMENT Left 03/10/2022    Procedure: CYSTOSCOPY AND LEFT URETERAL STENT EXCHANGE, RETROGRADE PYELOGRAM;  Surgeon: Leodan Mckee  Rajeev Seo MD;  Location: [facility] MAIN OR;  Service: Urology;  Laterality: Left;    CYSTOSCOPY W/ URETERAL STENT PLACEMENT Left 5/5/2023    Procedure: CYSTOSCOPY WITH LEFT URETERAL STENT PLACEMENT, RETROGRADE PYELOGRAM, CLOT EVACUATION, BLADDER FULGARATION;  Surgeon: Leodan Mckee Jr., MD;  Location: [facility] MAIN OR;  Service: Urology;  Laterality: Left;    D & C HYSTEROSCOPY N/A 05/22/2017    Procedure: DILATATION AND CURETTAGE, HYSTEROSCOPY ;  Surgeon: Cherie Oliveros MD;  Location:  [facility];  Service:     D & C HYSTEROSCOPY N/A 09/19/2019    Procedure: DILATATION AND CURETTAGE HYSTEROSCOPY/POLYPECTOMY;  Surgeon: Cherie Oliveros MD;  Location:  [facility];  Service: Gynecology    D & C HYSTEROSCOPY ENDOMETRIAL ABLATION N/A 5/5/2023    Procedure: DILATATION AND CURETTAGE;  Surgeon: Ina Marcos MD;  Location: [facility] MAIN OR;  Service: Obstetrics/Gynecology;  Laterality: N/A;    ENDOSCOPY      INSERTION AND REMOVAL HEMODIALYSIS CATHETER Right 05/01/2021    INSERTION HEMODIALYSIS CATHETER Right 05/01/2021    Procedure: HEMODIALYSIS CATHETER INSERTION;  Surgeon: Clint Hernández MD;  Location: [facility] MAIN OR;  Service: Vascular;  Laterality: Right;    LYMPH NODE BIOPSY      MASTECTOMY Bilateral 2004    VENOUS ACCESS DEVICE (PORT) INSERTION AND REMOVAL        Home Meds:  (Not in a hospital admission)    Current Meds:     Current Facility-Administered Medications:     sodium chloride 0.9 % bolus 500 mL, 500 mL, Intravenous, Once, Howard Guzman MD, Last Rate: 1,000 mL/hr at 10/13/23 1430, 500 mL at 10/13/23 1430    [COMPLETED] Insert Peripheral IV, , , Once **AND** sodium chloride 0.9 % flush 10 mL, 10 mL, Intravenous, PRN, Howard Guzman MD    Current Outpatient Medications:     Abemaciclib (Verzenio) 100 MG tablet, Take 1 tablet by mouth 2 (Two) Times a Day., Disp: 56 tablet, Rfl: 5    acetaminophen (TYLENOL) 500 MG tablet, Take 2 tablets by mouth As Needed for Mild Pain., Disp: , Rfl:      atorvastatin (LIPITOR) 40 MG tablet, TAKE ONE TABLET BY MOUTH ONCE NIGHTLY (Patient taking differently: Take 1 tablet by mouth Every Night.), Disp: 30 tablet, Rfl: 0    diphenoxylate-atropine (LOMOTIL) 2.5-0.025 MG per tablet, Take 1 tablet by mouth 4 (Four) Times a Day As Needed for Diarrhea., Disp: 30 tablet, Rfl: 0    folic acid (FOLVITE) 1 MG tablet, TAKE 1 TABLET BY MOUTH DAILY (Patient taking differently: Take 1 tablet by mouth Daily.), Disp: 30 tablet, Rfl: 2    Fulvestrant (FASLODEX) 250 MG/5ML chemo syringe, Inject 0.02 mL into the appropriate muscle as directed by prescriber Every 30 (Thirty) Days. In MD office Next due: 10/17/23, Disp: , Rfl:     gabapentin (Neurontin) 300 MG capsule, Take 1 capsule by mouth Every Night., Disp: 60 capsule, Rfl: 2    HYDROcodone-acetaminophen (NORCO) 5-325 MG per tablet, Take 1 tablet by mouth Every 4 (Four) Hours As Needed for Moderate Pain., Disp: 90 tablet, Rfl: 0    insulin detemir (Levemir FlexPen) 100 UNIT/ML injection, Inject 50 Units under the skin into the appropriate area as directed Daily., Disp: 11 mL, Rfl: 0    levothyroxine (SYNTHROID, LEVOTHROID) 50 MCG tablet, Take 1 tablet by mouth Daily. (Patient taking differently: Take 1 tablet by mouth Every Morning.), Disp: 90 tablet, Rfl: 1    ondansetron (ZOFRAN) 8 MG tablet, TAKE ONE TABLET BY MOUTH EVERY 8 HOURS AS NEEDED FOR NAUSEA OR VOMITING (Patient taking differently: Take 1 tablet by mouth Every 8 (Eight) Hours As Needed.), Disp: 18 tablet, Rfl: 2    potassium chloride ER (K-TAB) 20 MEQ tablet controlled-release ER tablet, Take 1 tablet by mouth Daily., Disp: , Rfl:     sertraline (ZOLOFT) 100 MG tablet, TAKE 1 AND 1/2 TABLET BY MOUTH DAILY (Patient taking differently: Take 1.5 tablets by mouth Daily.), Disp: 135 tablet, Rfl: 0    vitamin B-12 (CYANOCOBALAMIN) 1000 MCG tablet, Take 1 tablet by mouth Daily., Disp: , Rfl:     HYDROcodone-acetaminophen (NORCO) 5-325 MG per tablet, Take 1-2 tablets by mouth  "Every 4 (Four) Hours As Needed for Moderate Pain (Pain). (Patient not taking: Reported on 10/9/2023), Disp: 12 tablet, Rfl: 0    mupirocin (BACTROBAN) 2 % ointment, Apply 1 application  topically to the appropriate area as directed Daily. Applied to fistula insertion sites for dialysis, Disp: , Rfl:   Allergies:  No Known Allergies  Social History:   Social History     Tobacco Use    Smoking status: Never    Smokeless tobacco: Never   Substance Use Topics    Alcohol use: No      Family History:  Family History   Problem Relation Age of Onset    Heart attack Mother 72    Coronary artery disease Mother         Mother  from MI at 72    Diabetes Mother     Breast cancer Mother     Hyperlipidemia Mother     Arthritis Mother     Cancer Mother     Heart attack Father 74    Aortic aneurysm Father         Father with ruptured thoracic aortic aneurysm    Coronary artery disease Father         Father  from MI at 74    Heart disease Father     Hyperlipidemia Father     Arthritis Father     Heart attack Maternal Grandmother 76    Coronary artery disease Maternal Grandmother         MGM deceeased from MI at age 76    Malig Hyperthermia Neg Hx           Review of Systems  See history of present illness and past medical history.    Constitutional: Remarkable for generalized weakness  Cardiovascular: Remarkable for no chest pain or shortness of breath  GI: Remarkable for decreased appetite and nausea for the last few days  : Remarkable for no burning in urination frequency urgency or foul-smelling discharge  Musculoskeletal: Remarkable for generalized body aches pain and discomfort in her left toes and foot.  Neurological: Remarkable for fatigue and weakness and dizziness and fall.      Vitals:   BP 90/53 (Patient Position: Lying)   Pulse 93   Temp 97.8 °F (36.6 °C) (Tympanic)   Resp 16   Ht 170.2 cm (67\")   Wt (!) 154 kg (340 lb)   LMP  (LMP Unknown)   SpO2 100%   BMI 53.25 kg/m²   I/O:   Intake/Output " Summary (Last 24 hours) at 10/13/2023 1438  Last data filed at 10/13/2023 1429  Gross per 24 hour   Intake 100 ml   Output --   Net 100 ml     Exam:  Patient is examined using the personal protective equipment as per guidelines from infection control for this particular patient as enacted.  Hand washing was performed before and after patient interaction.  General Appearance:  Alert cooperative morbidly obese nontoxic-appearing female   Head:    Normocephalic, without obvious abnormality, atraumatic   Eyes:    PERRL, conjunctiva/corneas pale EOM's intact, both eyes   Ears:    Normal external ear canals, both ears   Nose:   Nares normal, septum midline, mucosa normal, no drainage    or sinus tenderness   Throat:   Lips, tongue, gums normal; oral mucosa pink and moist   Neck: Supple and no adenopathy   Back:     Symmetric, no curvature, ROM normal, no CVA tenderness   Lungs:     Clear to auscultation bilaterally, respirations unlabored   Chest Wall:    No tenderness or deformity    Heart:  Distant heart sounds   Abdomen:   The soft ascites present, nontender   Extremities: Bilateral trace edema   Pulses:   Pulses palpable in all extremities; symmetric all extremities   Skin: Abrasion between the toes noted   Neurologic: Alert and oriented and grossly nonfocal       Data Review:      I reviewed the patient's new clinical results.  Results from last 7 days   Lab Units 10/13/23  1108   WBC 10*3/mm3 5.84   HEMOGLOBIN g/dL 8.7*   PLATELETS 10*3/mm3 123*     Results from last 7 days   Lab Units 10/13/23  1108   SODIUM mmol/L 136   POTASSIUM mmol/L 2.9*   CHLORIDE mmol/L 96*   CO2 mmol/L 26.4   BUN mg/dL 19   CREATININE mg/dL 4.84*   CALCIUM mg/dL 8.4*   GLUCOSE mg/dL 156*     CT Head Without Contrast    Result Date: 10/13/2023   1. No acute intracranial abnormality is identified. Other than mild small vessel disease in the cerebral white matter, the remainder the head CT is within normal limits.  Radiation dose reduction  techniques were utilized, including automated exposure control and exposure modulation based on body size.   This report was finalized on 10/13/2023 4:37 PM by Dr. Sandeep Irwin M.D on Workstation: [id number]      US Paracentesis    Result Date: 10/9/2023  Successful ultrasound-guided paracentesis.   This report was finalized on 10/9/2023 2:53 PM by Dr. Justin Mederos M.D on Workstation: [id number]      CT Abdomen Pelvis Without Contrast    Result Date: 9/28/2023  Large amount of ascites. Incidental findings as above.  Findings: Limited evaluation of the inferior thorax demonstrates atelectasis, without consolidation, pleural effusion, or pneumothorax. The heart is mildly enlarged, without pericardial effusion. Mitral annular calcifications are seen.  There is a large amount of ascites. The kidneys are atrophic. There is a left-sided double-J ureteral stent.  The liver, spleen, adrenal glands, pancreas, stomach, small bowel, large bowel, uterus, and abdominal vasculature are normal as evaluated on this noncontrast examination. No intraperitoneal free gas is seen. No enlarged lymph nodes are demonstrated. Bilateral external iliac chain lymph nodes are smaller, measuring up to 9 mm in short axis diameter.  Bone windows demonstrate degenerative changes, without suspicious osseous lesion seen.  IMPRESSION: 1. Large amount of ascites, new  2. Smaller external iliac chain lymph nodes  3. Incidental findings as above.  This report was finalized on 9/28/2023 3:19 PM by Dr. Ashish Farmer M.D.     Brief Urine Lab Results  (Last result in the past 365 days)        Color   Clarity   Blood   Leuk Est   Nitrite   Protein   CREAT   Urine HCG        10/13/23 1210 Other  Comment: white   Turbid   Moderate (2+)   Small (1+)   Positive   >=300 mg/dL (3+)                 Microbiology Results (last 10 days)       Procedure Component Value - Date/Time    COVID PRE-OP / PRE-PROCEDURE SCREENING ORDER (NO ISOLATION) - Swab, Nasopharynx  [[id number]]  (Normal) Collected: 10/13/23 1056    Lab Status: Final result Specimen: Swab from Nasopharynx Updated: 10/13/23 1302    Narrative:      The following orders were created for panel order COVID PRE-OP / PRE-PROCEDURE SCREENING ORDER (NO ISOLATION) - Swab, Nasopharynx.  Procedure                               Abnormality         Status                     ---------                               -----------         ------                     COVID-19,[facility]...[[id number]]  Normal              Final result                 Please view results for these tests on the individual orders.    COVID-19,[facility] CEPHEID/JF NP SWAB IN TRANSPORT MEDIA 8-12 HR TAT - Swab, Nasopharynx [[id number]]  (Normal) Collected: 10/13/23 1056    Lab Status: Final result Specimen: Swab from Nasopharynx Updated: 10/13/23 1302     COVID19 Not Detected    Narrative:      Fact sheet for providers: https://www.fda.gov/media/762438/download     Fact sheet for patients: https://www.fda.gov/media/958924/download    Body Fluid Culture - Body Fluid, Peritoneum [[id number]] Collected: 10/09/23 1405    Lab Status: Final result Specimen: Body Fluid from Peritoneum Updated: 10/12/23 0691     Body Fluid Culture No growth at 3 days     Gram Stain Rare (1+) WBCs seen      No organisms seen              Assessment:  Active Hospital Problems    Diagnosis  POA    **Hypotension [I95.9]  Yes    Abnormal urinalysis [R82.90]  Unknown    Hypokalemia [E87.6]  Unknown    Diastolic CHF, chronic [I50.32]  Yes    ANDREW (obstructive sleep apnea) [G47.33]  Yes    Combined systolic and diastolic congestive heart failure [I50.40]  Yes    ESRD (end stage renal disease) [N18.6]  Yes    Morbid obesity with BMI of 50.0-59.9, adult [E66.01, Z68.43]  Not Applicable    Anemia in stage 3 chronic kidney disease [N18.30, D63.1]  Yes    Primary malignant neoplasm of breast with metastasis [C50.919]  Yes    Acquired hypothyroidism [E03.9]  Yes    Diabetes type 2,  controlled [E11.9]  Yes       Medical decision making/care plan: See admitting orders  Hypertension not responding to IV fluids and midodrine in the setting of end-stage renal disease, recent paracentesis and generalized anasarca with ascites status post 11 L of fluid removed on 10/9/2023-plan is to continue with IV albumin, consult intensivist service for need for pressors, follow serial lactic acid level and inform nephrology service.  Hold all of her antihypertensive and start her on midodrine.  Blood cultures are drawn and urine cultures are sent patient has received a dose of ceftriaxone for suspected UTI.  Patient need to be monitored closely for evolving C. difficile infection given the fact that her C. difficile toxin assay was positive with antigen assay was negative during previous hospitalization 2 weeks ago.  In history renal disease on hemodialysis-consult nephrology service keep an eye on her electrolytes and watch for metabolic acidosis.  Hypokalemia-continue with potassium replacement patient could not tolerate potassium powder so we will change it to tablet and keep an eye on her BMP and potassium level while awaiting callback from nephrology service.  History of chronic diastolic and systolic congestive heart failure-monitor for generalized anasarca and respiratory distress while fluid boluses are given to keep her blood pressure up.  Hypothyroidism-continue thyroid replacement therapy.  Diabetes mellitus-continue with her home regimen watch for hypoglycemia and perform Accu-Cheks before meals and at bedtime of every 6 hours.  Check hemoglobin A1c.  Obstructive sleep apnea morbid obesity-continue with continuous pulse ox monitoring and allow her to use her CPAP device.  Multiple falls with injury to her toes with plain x-ray revealing acute/subacute fracture involving the heads of the third fourth and fifth metatarsals as well as a fracture of the base of the fifth proximal phalanx with soft tissue  swelling around the forefoot.  Plan is to provide her pain medication with keeping an eye on her blood pressure and local wound care for the abrasions between the toes.    Moose Pete MD   10/13/2023  14:38 EDT    Parts of this note may be an electronic transcription/translation of spoken language to printed text using the Dragon dictation system.

## 2023-10-13 NOTE — TELEPHONE ENCOUNTER
Caller: Rk Hall    Relationship: Emergency Contact    Best call back number: [phone]    What is the best time to reach you: ASAP THIS MORNING    Who are you requesting to speak with (clinical staff, provider,  specific staff member): CLINICAL      What was the call regarding: PT'S  CALLING BACK ABOUT THE ISSUES THE PT CALLED FOR YESTERDAY.  SHE IS EXTREMELY WEAK AND HAS FALLEN 4 TIMES IN LAST 10 DAYS, PLEASE CALL BACK THIS MORNING TO ADVISE IF HE NEEDS TO TAKE THE PT TO THE HOSPITAL IF SHE WILL NOT BE ABLE TO BE SEEN SOON.

## 2023-10-14 ENCOUNTER — APPOINTMENT (OUTPATIENT)
Dept: CARDIOLOGY | Facility: HOSPITAL | Age: 63
End: 2023-10-14
Payer: MEDICARE

## 2023-10-14 LAB
ALBUMIN SERPL-MCNC: 2 G/DL (ref 3.5–5.2)
ALBUMIN/GLOB SERPL: 0.6 G/DL
ALP SERPL-CCNC: 121 U/L (ref 39–117)
ALT SERPL W P-5'-P-CCNC: 13 U/L (ref 1–33)
ANION GAP SERPL CALCULATED.3IONS-SCNC: 13 MMOL/L (ref 5–15)
AST SERPL-CCNC: 14 U/L (ref 1–32)
BASOPHILS # BLD AUTO: 0.03 10*3/MM3 (ref 0–0.2)
BASOPHILS NFR BLD AUTO: 0.9 % (ref 0–1.5)
BH BB BLOOD EXPIRATION DATE: NORMAL
BH BB BLOOD TYPE BARCODE: 6200
BH BB DISPENSE STATUS: NORMAL
BH BB PRODUCT CODE: NORMAL
BH BB UNIT NUMBER: NORMAL
BILIRUB SERPL-MCNC: 0.4 MG/DL (ref 0–1.2)
BUN SERPL-MCNC: 24 MG/DL (ref 8–23)
BUN/CREAT SERPL: 4.6 (ref 7–25)
CALCIUM SPEC-SCNC: 8.7 MG/DL (ref 8.6–10.5)
CHLORIDE SERPL-SCNC: 99 MMOL/L (ref 98–107)
CO2 SERPL-SCNC: 27 MMOL/L (ref 22–29)
CORTIS SERPL-MCNC: 18.8 MCG/DL
CREAT SERPL-MCNC: 5.23 MG/DL (ref 0.57–1)
CROSSMATCH INTERPRETATION: NORMAL
D-LACTATE SERPL-SCNC: 1.6 MMOL/L (ref 0.5–2)
D-LACTATE SERPL-SCNC: 2.7 MMOL/L (ref 0.5–2)
DEPRECATED RDW RBC AUTO: 53.5 FL (ref 37–54)
EGFRCR SERPLBLD CKD-EPI 2021: 8.6 ML/MIN/1.73
EOSINOPHIL # BLD AUTO: 0.05 10*3/MM3 (ref 0–0.4)
EOSINOPHIL NFR BLD AUTO: 1.5 % (ref 0.3–6.2)
ERYTHROCYTE [DISTWIDTH] IN BLOOD BY AUTOMATED COUNT: 15.1 % (ref 12.3–15.4)
GLOBULIN UR ELPH-MCNC: 3.5 GM/DL
GLUCOSE BLDC GLUCOMTR-MCNC: 107 MG/DL (ref 70–130)
GLUCOSE BLDC GLUCOMTR-MCNC: 62 MG/DL (ref 70–130)
GLUCOSE BLDC GLUCOMTR-MCNC: 63 MG/DL (ref 70–130)
GLUCOSE BLDC GLUCOMTR-MCNC: 71 MG/DL (ref 70–130)
GLUCOSE BLDC GLUCOMTR-MCNC: 71 MG/DL (ref 70–130)
GLUCOSE BLDC GLUCOMTR-MCNC: 75 MG/DL (ref 70–130)
GLUCOSE BLDC GLUCOMTR-MCNC: 79 MG/DL (ref 70–130)
GLUCOSE BLDC GLUCOMTR-MCNC: 79 MG/DL (ref 70–130)
GLUCOSE SERPL-MCNC: 101 MG/DL (ref 65–99)
HBA1C MFR BLD: 6 % (ref 4.8–5.6)
HCT VFR BLD AUTO: 23.5 % (ref 34–46.6)
HGB BLD-MCNC: 7.5 G/DL (ref 12–15.9)
IMM GRANULOCYTES # BLD AUTO: 0.04 10*3/MM3 (ref 0–0.05)
IMM GRANULOCYTES NFR BLD AUTO: 1.2 % (ref 0–0.5)
LYMPHOCYTES # BLD AUTO: 0.42 10*3/MM3 (ref 0.7–3.1)
LYMPHOCYTES NFR BLD AUTO: 12.3 % (ref 19.6–45.3)
MCH RBC QN AUTO: 31.3 PG (ref 26.6–33)
MCHC RBC AUTO-ENTMCNC: 31.9 G/DL (ref 31.5–35.7)
MCV RBC AUTO: 97.9 FL (ref 79–97)
MONOCYTES # BLD AUTO: 0.23 10*3/MM3 (ref 0.1–0.9)
MONOCYTES NFR BLD AUTO: 6.7 % (ref 5–12)
NEUTROPHILS NFR BLD AUTO: 2.65 10*3/MM3 (ref 1.7–7)
NEUTROPHILS NFR BLD AUTO: 77.4 % (ref 42.7–76)
NRBC BLD AUTO-RTO: 0.3 /100 WBC (ref 0–0.2)
PLATELET # BLD AUTO: 105 10*3/MM3 (ref 140–450)
PMV BLD AUTO: 10.5 FL (ref 6–12)
POTASSIUM SERPL-SCNC: 2.8 MMOL/L (ref 3.5–5.2)
PROT SERPL-MCNC: 5.5 G/DL (ref 6–8.5)
RBC # BLD AUTO: 2.4 10*6/MM3 (ref 3.77–5.28)
SODIUM SERPL-SCNC: 139 MMOL/L (ref 136–145)
UNIT  ABO: NORMAL
UNIT  RH: NORMAL
WBC NRBC COR # BLD: 3.42 10*3/MM3 (ref 3.4–10.8)

## 2023-10-14 PROCEDURE — 85025 COMPLETE CBC W/AUTO DIFF WBC: CPT | Performed by: INTERNAL MEDICINE

## 2023-10-14 PROCEDURE — 87040 BLOOD CULTURE FOR BACTERIA: CPT | Performed by: INTERNAL MEDICINE

## 2023-10-14 PROCEDURE — 25010000002 POTASSIUM CHLORIDE 10 MEQ/100ML SOLUTION

## 2023-10-14 PROCEDURE — 63710000001 INSULIN GLARGINE PER 5 UNITS: Performed by: INTERNAL MEDICINE

## 2023-10-14 PROCEDURE — 36430 TRANSFUSION BLD/BLD COMPNT: CPT

## 2023-10-14 PROCEDURE — 99221 1ST HOSP IP/OBS SF/LOW 40: CPT | Performed by: INTERNAL MEDICINE

## 2023-10-14 PROCEDURE — 82948 REAGENT STRIP/BLOOD GLUCOSE: CPT

## 2023-10-14 PROCEDURE — 93306 TTE W/DOPPLER COMPLETE: CPT

## 2023-10-14 PROCEDURE — 83605 ASSAY OF LACTIC ACID: CPT | Performed by: EMERGENCY MEDICINE

## 2023-10-14 PROCEDURE — 86900 BLOOD TYPING SEROLOGIC ABO: CPT

## 2023-10-14 PROCEDURE — 25510000001 PERFLUTREN (DEFINITY) 8.476 MG IN SODIUM CHLORIDE (PF) 0.9 % 10 ML INJECTION: Performed by: INTERNAL MEDICINE

## 2023-10-14 PROCEDURE — 93306 TTE W/DOPPLER COMPLETE: CPT | Performed by: INTERNAL MEDICINE

## 2023-10-14 PROCEDURE — 80053 COMPREHEN METABOLIC PANEL: CPT | Performed by: INTERNAL MEDICINE

## 2023-10-14 PROCEDURE — 25010000002 ONDANSETRON PER 1 MG

## 2023-10-14 PROCEDURE — 25010000002 CEFTRIAXONE PER 250 MG: Performed by: INTERNAL MEDICINE

## 2023-10-14 PROCEDURE — P9016 RBC LEUKOCYTES REDUCED: HCPCS

## 2023-10-14 PROCEDURE — 83036 HEMOGLOBIN GLYCOSYLATED A1C: CPT | Performed by: INTERNAL MEDICINE

## 2023-10-14 PROCEDURE — 82533 TOTAL CORTISOL: CPT | Performed by: INTERNAL MEDICINE

## 2023-10-14 RX ORDER — POTASSIUM CHLORIDE 7.45 MG/ML
10 INJECTION INTRAVENOUS
Status: DISPENSED | OUTPATIENT
Start: 2023-10-14 | End: 2023-10-14

## 2023-10-14 RX ORDER — INSULIN LISPRO 100 [IU]/ML
2-9 INJECTION, SOLUTION INTRAVENOUS; SUBCUTANEOUS
Status: DISCONTINUED | OUTPATIENT
Start: 2023-10-14 | End: 2023-10-23

## 2023-10-14 RX ORDER — DIPHENOXYLATE HYDROCHLORIDE AND ATROPINE SULFATE 2.5; .025 MG/1; MG/1
1 TABLET ORAL DAILY
Status: DISCONTINUED | OUTPATIENT
Start: 2023-10-14 | End: 2023-10-26 | Stop reason: HOSPADM

## 2023-10-14 RX ORDER — MIDODRINE HYDROCHLORIDE 5 MG/1
10 TABLET ORAL
Status: DISCONTINUED | OUTPATIENT
Start: 2023-10-14 | End: 2023-10-19

## 2023-10-14 RX ADMIN — POTASSIUM CHLORIDE 10 MEQ: 7.46 INJECTION, SOLUTION INTRAVENOUS at 05:13

## 2023-10-14 RX ADMIN — PERFLUTREN 2 ML: 6.52 INJECTION, SUSPENSION INTRAVENOUS at 16:35

## 2023-10-14 RX ADMIN — INSULIN GLARGINE 50 UNITS: 100 INJECTION, SOLUTION SUBCUTANEOUS at 22:36

## 2023-10-14 RX ADMIN — DEXTROSE MONOHYDRATE 25 G: 25 INJECTION, SOLUTION INTRAVENOUS at 09:49

## 2023-10-14 RX ADMIN — CEFTRIAXONE 2000 MG: 2 INJECTION, POWDER, FOR SOLUTION INTRAMUSCULAR; INTRAVENOUS at 22:36

## 2023-10-14 RX ADMIN — SERTRALINE 150 MG: 100 TABLET, FILM COATED ORAL at 09:01

## 2023-10-14 RX ADMIN — ONDANSETRON 4 MG: 2 INJECTION INTRAMUSCULAR; INTRAVENOUS at 20:06

## 2023-10-14 RX ADMIN — POTASSIUM CHLORIDE 20 MEQ: 750 TABLET, EXTENDED RELEASE ORAL at 09:23

## 2023-10-14 RX ADMIN — Medication 10 ML: at 09:02

## 2023-10-14 RX ADMIN — MIDODRINE HYDROCHLORIDE 10 MG: 5 TABLET ORAL at 14:04

## 2023-10-14 RX ADMIN — Medication 1 TABLET: at 20:05

## 2023-10-14 RX ADMIN — MIDODRINE HYDROCHLORIDE 10 MG: 5 TABLET ORAL at 20:05

## 2023-10-14 RX ADMIN — LEVOTHYROXINE SODIUM 50 MCG: 50 TABLET ORAL at 06:35

## 2023-10-14 RX ADMIN — POTASSIUM CHLORIDE 10 MEQ: 7.46 INJECTION, SOLUTION INTRAVENOUS at 06:35

## 2023-10-14 RX ADMIN — ONDANSETRON 4 MG: 2 INJECTION INTRAMUSCULAR; INTRAVENOUS at 06:40

## 2023-10-14 RX ADMIN — GABAPENTIN 300 MG: 300 CAPSULE ORAL at 22:37

## 2023-10-14 RX ADMIN — FOLIC ACID 1 MG: 1 TABLET ORAL at 09:01

## 2023-10-14 RX ADMIN — MIDODRINE HYDROCHLORIDE 5 MG: 5 TABLET ORAL at 10:37

## 2023-10-14 RX ADMIN — ONDANSETRON 4 MG: 2 INJECTION INTRAMUSCULAR; INTRAVENOUS at 14:04

## 2023-10-14 RX ADMIN — Medication 1000 MCG: at 09:01

## 2023-10-14 RX ADMIN — MUPIROCIN 1 APPLICATION: 20 OINTMENT TOPICAL at 10:37

## 2023-10-14 RX ADMIN — MIDODRINE HYDROCHLORIDE 5 MG: 5 TABLET ORAL at 06:35

## 2023-10-14 RX ADMIN — CEFTRIAXONE 2000 MG: 2 INJECTION, POWDER, FOR SOLUTION INTRAMUSCULAR; INTRAVENOUS at 01:49

## 2023-10-14 RX ADMIN — ATORVASTATIN CALCIUM 40 MG: 20 TABLET, FILM COATED ORAL at 22:36

## 2023-10-14 NOTE — NURSING NOTE
1935 BP reading of 86/42 in right wrist and 80/44 in right ankle. A paged. Dr Pete called back with orders: 250cc bolus, midodrine now and then TID starting tomorrow AM, page Neph to make aware.   Neph paged.   2045 Dr Abarca, Nephrology, called back and was ok with bolus and midodrine and to cont to monitor since pt is asymptomatic.     After bolus completed, BP = 77/35. A paged again.   Dr Pete came to bedside.   Ordered Albumin IV and intensivist consult for ICU placement for pressors.   Consult called [phone].  Dr Abarca ok with move to ICU.

## 2023-10-14 NOTE — CONSULTS
CONSULT NOTE    Patient Identification:  Juana Hall  63 y.o.  female  1960  [id number]            Requesting physician: Dr. Pete    Reason for Consultation:  ICU transfer    CC: Generalized weakness, hypotension    History of Present Illness:  Juana Hall 63-year-old female with metastatic breast cancer, end-stage renal disease on hemodialysis (5 times a week at home through AV fistula), diabetes mellitus, hypothyroidism, ANDREW compliant with CPAP, and morbid obesity (BMI 53.25).  Patient has previously received radiation, mastectomy and chemotherapy for breast cancer.    Patient was recently hospitalized for 2 days for intractable nausea and vomiting.  During this admission, patient was noted to have a large amount of ascites.  She was found to be anemic and received packed red blood cells and was discharged.  She underwent an outpatient paracentesis on 10/9/2023 per hematology/oncology recommendation with 11 L removed.    She presented to the emergency department on 10/13/2023 with complaints of hypotension, progressive weakness, dizziness and multiple falls since paracentesis on Monday.  She called her oncologist, Dr. Irvin's office and they recommended patient present to the ED for further evaluation.  Upon ED arrival, patient's blood pressure was 97/37 and patient's blood pressure initially improved with IV fluid administration.  Lab work from the emergency department revealed proBNP of 21,914, potassium 2.9, creatinine 4.84, albumin 1.9, WBC 5.84 and hemoglobin 8.7.  Lactic was elevated at 3.4 procalcitonin 3.83.    Patient was admitted for hypotension, refractory to IV fluid and midodrine administration.  Since admission, patient has had multiple low blood pressures, with lowest being 70/40.  Patient received additional IV fluids without improvement.  We were consulted to evaluate for hypotension and possible need for vasopressor support.    Review of Systems:  Constitutional: Negative for  fever, chills, diaphoresis.  Positive for generalized weakness and fagitue.  HEENT: Negative for congestion, sinus pain or pressure.  Respiratory: Negative for shortness of breath, cough, wheezing.  Cardiac: Negative for chest pain or palpitations.  Positive for chronic leg swelling/lymphedema.  GI: Negative for abdominal pain, vomiting or diarrhea.  Positive for nausea and decreased appetite.  : Negative for dysuria, frequency, urgency or flank pain.  Musculoskeletal: Positive for generalized body aches, pain and discomfort in left toes and foot.  Skin: Negative for pallor, rash or wound.  Neurological: Positive for falls, dizziness, and weakness.    Past Medical History:  Past Medical History:   Diagnosis Date    Anemia     Anxiety and depression     Bicuspid aortic valve     had surgery    Breast cancer 2004    RIGHT, HAD NEELA. 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    History of sepsis 2010    KIDNEY STONE    History of transfusion     no reaction    Hyperlipidemia     Hyperthyroidism     Hypothyroidism     Kidney stone     CURRENT LEFT-DEC 2021    Lymphedema of leg     LEFT    Metastasis from breast cancer 2019    STOMACH-OMENTUM    Neuromuscular disorder     Obesity     ANDREW on CPAP     CPAP    Osteoarthrosis     Peripheral neuropathy     FEET BILAT    Radiculopathy     Rectal bleeding 08/16/2022    Thickened endometrium     Urinary incontinence     wears pads    Weakness     BILAT  LEGS-WITH ANY AMT OF TIME       Past Surgical History:  Past Surgical History:   Procedure Laterality Date    AORTIC VALVULOPLASTY  2023    ARTERIOVENOUS FISTULA/SHUNT SURGERY Left 2021    Procedure: LEFT  BRACHIOCEPALIC ARTERIOVENOUS FISTULA;  Surgeon: Dejuan Adler MD;  Location: [facility] OR;  Service: Vascular;  Laterality: Left;    BREAST RECONSTRUCTION      WITH IMPLANTS, AND REVISION, NOW REMOVED    BREAST SURGERY Bilateral     breast implants removed and then replaced due to infection    CARDIAC CATHETERIZATION N/A 2022    Procedure: CORONARY ANGIOGRAPHY;  Surgeon: Luis Rivers MD;  Location: [facility] CATH INVASIVE LOCATION;  Service: Cardiology;  Laterality: N/A;    CARDIAC CATHETERIZATION N/A 2022    Procedure: Right Heart Cath;  Surgeon: Luis Rivers MD;  Location: [facility] CATH INVASIVE LOCATION;  Service: Cardiology;  Laterality: N/A;    CARDIAC CATHETERIZATION N/A 01/10/2023    Procedure: Valvuloplasty;  Surgeon: Luis Rivers MD;  Location: [facility] CATH INVASIVE LOCATION;  Service: Cardiovascular;  Laterality: N/A;  01/10/2023    CARDIAC CATHETERIZATION N/A 01/10/2023    Procedure: Aortic root aortogram;  Surgeon: Luis Rivers MD;  Location: [facility] CATH INVASIVE LOCATION;  Service: Cardiovascular;  Laterality: N/A;    CATARACT EXTRACTION WITH INTRAOCULAR LENS IMPLANT Bilateral      SECTION  1987     SECTION  1987    CYSTOSCOPY W/ URETERAL STENT PLACEMENT  2010    CYSTOSCOPY W/ URETERAL STENT PLACEMENT Left 2021    Procedure: CYSTOSCOPY KEFT RETROGRADE PYELOGRAM  LEFT  URETERAL STENT PLACEMENT;  Surgeon: Leodan Mckee Jr., MD;  Location: [facility] OR;  Service: Urology;  Laterality: Left;    CYSTOSCOPY W/ URETERAL STENT PLACEMENT Left 03/10/2022    Procedure: CYSTOSCOPY AND LEFT URETERAL STENT EXCHANGE, RETROGRADE PYELOGRAM;  Surgeon: Leodan Mckee Jr., MD;  Location:   [facility] OR;  Service: Urology;  Laterality: Left;    CYSTOSCOPY W/ URETERAL STENT PLACEMENT Left 5/5/2023    Procedure: CYSTOSCOPY WITH LEFT URETERAL STENT PLACEMENT, RETROGRADE PYELOGRAM, CLOT EVACUATION, BLADDER FULGARATION;  Surgeon: Leodan Mckee Jr., MD;  Location: [facility] MAIN OR;  Service: Urology;  Laterality: Left;    D & C HYSTEROSCOPY N/A 05/22/2017    Procedure: DILATATION AND CURETTAGE, HYSTEROSCOPY ;  Surgeon: Cherie Oliveros MD;  Location:  [facility];  Service:     D & C HYSTEROSCOPY N/A 09/19/2019    Procedure: DILATATION AND CURETTAGE HYSTEROSCOPY/POLYPECTOMY;  Surgeon: Cherie Oliveros MD;  Location:  [facility];  Service: Gynecology    D & C HYSTEROSCOPY ENDOMETRIAL ABLATION N/A 5/5/2023    Procedure: DILATATION AND CURETTAGE;  Surgeon: Ina Marcos MD;  Location: [facility] MAIN OR;  Service: Obstetrics/Gynecology;  Laterality: N/A;    ENDOSCOPY      INSERTION AND REMOVAL HEMODIALYSIS CATHETER Right 05/01/2021    INSERTION HEMODIALYSIS CATHETER Right 05/01/2021    Procedure: HEMODIALYSIS CATHETER INSERTION;  Surgeon: Clint Hernández MD;  Location: [facility] MAIN OR;  Service: Vascular;  Laterality: Right;    LYMPH NODE BIOPSY      MASTECTOMY Bilateral 2004    VENOUS ACCESS DEVICE (PORT) INSERTION AND REMOVAL          Home Meds:  Medications Prior to Admission   Medication Sig Dispense Refill Last Dose    Abemaciclib (Verzenio) 100 MG tablet Take 1 tablet by mouth 2 (Two) Times a Day. 56 tablet 5 10/12/2023    acetaminophen (TYLENOL) 500 MG tablet Take 2 tablets by mouth As Needed for Mild Pain.   10/12/2023    atorvastatin (LIPITOR) 40 MG tablet TAKE ONE TABLET BY MOUTH ONCE NIGHTLY (Patient taking differently: Take 1 tablet by mouth Every Night.) 30 tablet 0 Past Week    diphenoxylate-atropine (LOMOTIL) 2.5-0.025 MG per tablet Take 1 tablet by mouth 4 (Four) Times a Day As Needed for Diarrhea. 30 tablet 0 Past Month    folic acid (FOLVITE) 1 MG tablet TAKE 1 TABLET BY MOUTH DAILY  (Patient taking differently: Take 1 tablet by mouth Daily.) 30 tablet 2 Past Week    Fulvestrant (FASLODEX) 250 MG/5ML chemo syringe Inject 0.02 mL into the appropriate muscle as directed by prescriber Every 30 (Thirty) Days. In MD office  Next due: 10/17/23   9/19/2023    gabapentin (Neurontin) 300 MG capsule Take 1 capsule by mouth Every Night. 60 capsule 2 10/12/2023 at 2200    insulin detemir (Levemir FlexPen) 100 UNIT/ML injection Inject 50 Units under the skin into the appropriate area as directed Daily. 11 mL 0 Past Week    levothyroxine (SYNTHROID, LEVOTHROID) 50 MCG tablet Take 1 tablet by mouth Daily. (Patient taking differently: Take 1 tablet by mouth Every Morning.) 90 tablet 1 10/9/2023    mupirocin (BACTROBAN) 2 % ointment Apply 1 application  topically to the appropriate area as directed Daily. Applied to fistula insertion sites for dialysis   10/12/2023    ondansetron (ZOFRAN) 8 MG tablet TAKE ONE TABLET BY MOUTH EVERY 8 HOURS AS NEEDED FOR NAUSEA OR VOMITING (Patient taking differently: Take 1 tablet by mouth Every 8 (Eight) Hours As Needed.) 18 tablet 2 10/12/2023    potassium chloride ER (K-TAB) 20 MEQ tablet controlled-release ER tablet Take 1 tablet by mouth Daily.   10/11/2023    sertraline (ZOLOFT) 100 MG tablet TAKE 1 AND 1/2 TABLET BY MOUTH DAILY (Patient taking differently: Take 1.5 tablets by mouth Daily.) 135 tablet 0 10/12/2023 at 2200    vitamin B-12 (CYANOCOBALAMIN) 1000 MCG tablet Take 1 tablet by mouth Daily.   10/9/2023 at 0900       Allergies:  No Known Allergies    Social History:   Social History     Socioeconomic History    Marital status:      Spouse name: Rk   Tobacco Use    Smoking status: Never     Passive exposure: Never    Smokeless tobacco: Never   Vaping Use    Vaping Use: Never used   Substance and Sexual Activity    Alcohol use: No    Drug use: Never    Sexual activity: Yes     Partners: Male     Birth control/protection: Post-menopausal       Family  "History:  Family History   Problem Relation Age of Onset    Heart attack Mother 72    Coronary artery disease Mother         Mother  from MI at 72    Diabetes Mother     Breast cancer Mother     Hyperlipidemia Mother     Arthritis Mother     Cancer Mother     Heart attack Father 74    Aortic aneurysm Father         Father with ruptured thoracic aortic aneurysm    Coronary artery disease Father         Father  from MI at 74    Heart disease Father     Hyperlipidemia Father     Arthritis Father     Heart attack Maternal Grandmother 76    Coronary artery disease Maternal Grandmother         MGM deceeased from MI at age 76    Malig Hyperthermia Neg Hx        Physical Exam:  BP (!) 77/35   Pulse 92   Temp 98.2 °F (36.8 °C) (Oral)   Resp 18   Ht 170.2 cm (67\")   Wt (!) 154 kg (340 lb)   LMP  (LMP Unknown)   SpO2 98%   BMI 53.25 kg/m²  Body mass index is 53.25 kg/m². 98% (!) 154 kg (340 lb)    Physical Exam:  Constitutional: Middle aged, morbidly obese pt in bed, No acute respiratory distress, no accessory muscle use  Head: - NCAT  Eyes: No pallor, Anicteric conjunctiva, EOMI.  ENMT:  Mallampati 3, no oral thrush. Dry MM.   NECK: Trachea midline, No thyromegaly, no palpable cervical lymphadenopathy  Heart: RRR, no murmur.  2+ bilateral lower extremity edema-chronic.  Lungs: CAN +, No wheezes/ crackles heard    Abdomen: Soft. No tenderness, guarding or rigidity. No palpable masses  Extremities: Extremities warm and well perfused. No cyanosis/ clubbing  Neuro: Conscious, answers appropriately, no gross focal neuro deficits  Psych: Mood and affect appropriate    PPE recommended per [facility] infectious disease Isolation protocol for the current clinical scenario(as mentioned below) was followed.      LABS:  COVID19   Date Value Ref Range Status   10/13/2023 Not Detected Not Detected - Ref. Range Final       Lab Results   Component Value Date    CALCIUM 8.4 (L) 10/13/2023    PHOS 4.2 2023 "     Results from last 7 days   Lab Units 10/13/23  1259 10/13/23  1108   MAGNESIUM mg/dL  --  1.7   SODIUM mmol/L  --  136   POTASSIUM mmol/L  --  2.9*   CHLORIDE mmol/L  --  96*   CO2 mmol/L  --  26.4   BUN mg/dL  --  19   CREATININE mg/dL  --  4.84*   GLUCOSE mg/dL  --  156*   CALCIUM mg/dL  --  8.4*   WBC 10*3/mm3  --  5.84   HEMOGLOBIN g/dL  --  8.7*   PLATELETS 10*3/mm3  --  123*   ALT (SGPT) U/L  --  14   AST (SGOT) U/L  --  15   PROBNP pg/mL  --  21,914.0*   PROCALCITONIN ng/mL 3.83*  --      Lab Results   Component Value Date    TROPONINT 51 (H) 10/13/2023     Results from last 7 days   Lab Units 10/13/23  1259 10/13/23  1108   HSTROP T ng/L 51* 51*     Results from last 7 days   Lab Units 10/09/23  1405   BODYFLDCX  No growth at 3 days     Results from last 7 days   Lab Units 10/13/23  2138 10/13/23  1853 10/13/23  1431 10/13/23  1259   PROCALCITONIN ng/mL  --   --   --  3.83*   LACTATE mmol/L 2.8* 3.5* 3.4*  --              Results from last 7 days   Lab Units 10/13/23  1108   INR  1.36*     Results from last 7 days   Lab Units 10/09/23  1405   BODYFLDCX  No growth at 3 days     Lab Results   Component Value Date    TSH 2.810 10/13/2023     Estimated Creatinine Clearance: 18 mL/min (A) (by C-G formula based on SCr of 4.84 mg/dL (H)).  Results from last 7 days   Lab Units 10/13/23  1210   NITRITE UA  Positive*   WBC UA /HPF Too Numerous to Count*   BACTERIA UA /HPF Unable to determine due to loaded field*   SQUAM EPITHEL UA /HPF Unable to determine due to loaded field*        Imaging: I personally visualized the images of scans/x-rays performed within last 3 days.      Assessment:  Hypotension  Abnormal urinalysis/acute UTI  End-stage renal disease  Hypokalemia  Chronic diastolic and systolic heart failure  Hypothyroidism  Diabetes mellitus  Obstructive sleep apnea   Multiple falls  Fractures in left foot/ankle  Morbid obesity (BMI 53.25)    Recommendations:  Hypotension  Blood pressure remains 80/40  despite IV fluid administration.  50 g of 25% albumin ordered, pending administration.  Midodrine ordered by primary-patient reports she was on midodrine scheduled over a year ago, but this was discontinued.  Norepinephrine to maintain MAP greater than 65 if needed.     Abnormal urinalysis/acute UTI  Ceftriaxone x5 days.    End-stage renal disease  Nephrology consulted by primary.  Patient does in home dialysis 5 days a week-most recent HD 9/12/2023.    Hypokalemia  Replacement ordered by primary.  BMP recheck in morning.    Chronic diastolic and systolic heart failure  Appears euvolemic at this time- no evidence of fluid overload.  Monitor strict I&Os.    Hypothyroidism  Levothyroxine.    Diabetes mellitus  Accuchecks/ SSI.    Obstructive sleep apnea   Home CPAP if available.    Multiple falls  PT/OT consult.    Fractures in left foot/ankle  Morbid obesity (BMI 53.25)    CC 30 minutes, excluding time spent on any billable procedures    Patient was placed in face mask upon entering room and kept mask on throughout our encounter. I wore full protective equipment throughout this patient encounter including a face mask, gown and gloves. Hand hygiene was performed before donning protective equipment and after removal when leaving the room.    Terra Arreaga, APRN  10/13/2023  23:17 EDT      Much of this encounter note is an electronic transcription/translation of spoken language to printed text using Dragon Software.

## 2023-10-14 NOTE — PROGRESS NOTES
63yr female with metastatic breast cancer on therapy. Large ascites and s/p large volume paracentesis (11L) on 10/9/23. Did not receive albumin. Since then dizzy and light-headed. ESRD on HD at home 5days/week. Prior hx of chronic hypotension requiring midodrine, but not for past 1 year. Also DM2 and hx vavluloplasty for AS. Hx Cdiff. Also has had recurrent UTI (?). Persistently dizzy when she stands up. Denies chest pain or soa.    Vitals reveal hypotension. No hypoxia. HR controlled.  Lungs clear. Evidence b/l mastectomy. Morbidly obese but also abdominal distension and likely ascites. Ext without edema.  Recent labs and imaging noted.    Hypotension after large volume paracentesis.  Metastatic breast carcinoma  Hx of chronic hypotension in past  ESRD on HD  DM2  Moderate to severe AS  Hx valvuloplasty  Recent Afib  ANDREW on CPAP  Super obese  Anemia      Give albumin iv. If persistent hypotension, consider levophed.  Check cortisol.  Continue midodrine.  Note abx per hospitalist/ID. Caution with hx Cdiff.  Note recent cardiology note re: afib and AS. Will consult.  Continue use of home CPAP.  Consider sc heparin if Hb remains stable.    I spent 35 mins critical care time in care of this patient outside of any procedures.     Cortes Olvera MD  10/13/2023  2345hrs

## 2023-10-14 NOTE — PLAN OF CARE
Goal Outcome Evaluation:      RN notified of blood glucose of 63. Patient given Apple juice and Orange juice with morning pills. Tray set up for breakfast but patient not tolerating due to intermittent nausea. Recheck blood glucose of 62. PRN IV dextrose administered.

## 2023-10-14 NOTE — CONSULTS
Date of Hospital Visit: [unfilled]ENC@  Encounter Provider: Donovan Stover MD  Place of Service: [facility] CARDIOLOGY  Patient Name: Juana aHll  :1960  Referral Provider: Moose Pete MD    Chief complaint weakness    Patient presented yesterday with complaint of weakness.    She has a history of morbid obesity, history of breast cancer with history of radiation mastectomy and chemotherapy, end-stage renal disease on hemodialysis at home and dialyze herself via left arm AV fistula 5 days a week with run time of 2 hours (last dialysis 10/12).  She also underwent balloon aortic valvuloplasty in 2023 for severe aortic stenosis.  She was not felt to be a candidate for TAVR at that time.    She presented to the ED yesterday with complaint of weakness ongoing for the past 4 days.  She had had a recent large-volume paracentesis.  She has had persistent hypotension, and has been admitted to the ICU where she has received IV fluids and midodrine.    She is alert and oriented, but feels nauseated.  Not really able to eat.      Past Medical History:   Diagnosis Date    Anemia     Anxiety and depression     Bicuspid aortic valve     had surgery    Breast cancer     RIGHT, HAD NEELA. MASTECTOMY, CHEMO AND RADIATION    CHF (congestive heart failure)     CKD (chronic kidney disease)     dialysis    COVID 2023    Diabetes mellitus     Dialysis patient     pt does at home    Elevated cholesterol     Frequent UTI     last 6 months    Heart murmur     History of cancer chemotherapy     History of hypertension     due to low b/p was taken off meds    History of kidney stones     History of MRSA infection     POST BREAST SURGERY-TREATED [facility]    History of radiation therapy     2 times [id number] for breast cancer   and  for omentum cancer    History of sepsis     KIDNEY STONE    History of transfusion     no reaction    Hyperlipidemia     Hyperthyroidism      Hypothyroidism     Kidney stone     CURRENT LEFT-DEC 2021    Lymphedema of leg     LEFT    Metastasis from breast cancer 2019    STOMACH-OMENTUM    Neuromuscular disorder     Obesity     ANDREW on CPAP     CPAP    Osteoarthrosis     Peripheral neuropathy     FEET BILAT    Radiculopathy     Rectal bleeding 2022    Thickened endometrium     Urinary incontinence     wears pads    Weakness     BILAT LEGS-WITH ANY AMT OF TIME       Past Surgical History:   Procedure Laterality Date    AORTIC VALVULOPLASTY  2023    ARTERIOVENOUS FISTULA/SHUNT SURGERY Left 2021    Procedure: LEFT  BRACHIOCEPALIC ARTERIOVENOUS FISTULA;  Surgeon: Dejuan Adler MD;  Location: [facility] MAIN OR;  Service: Vascular;  Laterality: Left;    BREAST RECONSTRUCTION  2004    WITH IMPLANTS, AND REVISION, NOW REMOVED    BREAST SURGERY Bilateral     breast implants removed and then replaced due to infection    CARDIAC CATHETERIZATION N/A 2022    Procedure: CORONARY ANGIOGRAPHY;  Surgeon: Luis Rivers MD;  Location:  [facility] CATH INVASIVE LOCATION;  Service: Cardiology;  Laterality: N/A;    CARDIAC CATHETERIZATION N/A 2022    Procedure: Right Heart Cath;  Surgeon: Luis Rivers MD;  [facility];  Service: Cardiology;  Laterality: N/A;    CARDIAC CATHETERIZATION N/A 01/10/2023    Procedure: Valvuloplasty;  Surgeon: Luis Rivers MD;  [facility];  Service: Cardiovascular;  Laterality: N/A;  01/10/2023    CARDIAC CATHETERIZATION N/A 01/10/2023    Procedure: Aortic root aortogram;  Surgeon: Luis Rivers MD;  [facility];  Service: Cardiovascular;  Laterality: N/A;    CATARACT EXTRACTION WITH INTRAOCULAR LENS IMPLANT Bilateral      SECTION  1987     SECTION  1987    CYSTOSCOPY W/ URETERAL STENT PLACEMENT  2010    CYSTOSCOPY W/ URETERAL STENT PLACEMENT Left 2021     Procedure: CYSTOSCOPY KEFT RETROGRADE PYELOGRAM  LEFT  URETERAL STENT PLACEMENT;  Surgeon: Leodan Mckee Jr., MD;  Location: [facility] MAIN OR;  Service: Urology;  Laterality: Left;    CYSTOSCOPY W/ URETERAL STENT PLACEMENT Left 03/10/2022    Procedure: CYSTOSCOPY AND LEFT URETERAL STENT EXCHANGE, RETROGRADE PYELOGRAM;  Surgeon: Leodan Mckee Jr., MD;  [facility];  Service: Urology;  Laterality: Left;    CYSTOSCOPY W/ URETERAL STENT PLACEMENT Left 5/5/2023    Procedure: CYSTOSCOPY WITH LEFT URETERAL STENT PLACEMENT, RETROGRADE PYELOGRAM, CLOT EVACUATION, BLADDER FULGARATION;  Surgeon: Leodan Mckee Jr., MD;  Location: [facility] MAIN OR;  Service: Urology;  Laterality: Left;    D & C HYSTEROSCOPY N/A 05/22/2017    Procedure: DILATATION AND CURETTAGE, HYSTEROSCOPY ;  Surgeon: Cherie Oliveros MD;  [facility];  Service:     D & C HYSTEROSCOPY N/A 09/19/2019    Procedure: DILATATION AND CURETTAGE HYSTEROSCOPY/POLYPECTOMY;  Surgeon: Cherie Oliveros MD;  [facility];  Service: Gynecology    D & C HYSTEROSCOPY ENDOMETRIAL ABLATION N/A 5/5/2023    Procedure: DILATATION AND CURETTAGE;  Surgeon: Ina Marcos MD;  Location: [facility] MAIN OR;  Service: Obstetrics/Gynecology;  Laterality: N/A;    ENDOSCOPY      INSERTION AND REMOVAL HEMODIALYSIS CATHETER Right 05/01/2021    INSERTION HEMODIALYSIS CATHETER Right 05/01/2021    Procedure: HEMODIALYSIS CATHETER INSERTION;  Surgeon: Clint Hernández MD;  Location: [facility] MAIN OR;  Service: Vascular;  Laterality: Right;    LYMPH NODE BIOPSY      MASTECTOMY Bilateral 2004    VENOUS ACCESS DEVICE (PORT) INSERTION AND REMOVAL         Medications Prior to Admission   Medication Sig Dispense Refill Last Dose    Abemaciclib (Verzenio) 100 MG tablet Take 1 tablet by mouth 2 (Two) Times a Day. 56 tablet 5 10/12/2023    acetaminophen (TYLENOL) 500 MG tablet Take 2 tablets by mouth As Needed for Mild Pain.   10/12/2023    atorvastatin  (LIPITOR) 40 MG tablet TAKE ONE TABLET BY MOUTH ONCE NIGHTLY (Patient taking differently: Take 1 tablet by mouth Every Night.) 30 tablet 0 Past Week    diphenoxylate-atropine (LOMOTIL) 2.5-0.025 MG per tablet Take 1 tablet by mouth 4 (Four) Times a Day As Needed for Diarrhea. 30 tablet 0 Past Month    folic acid (FOLVITE) 1 MG tablet TAKE 1 TABLET BY MOUTH DAILY (Patient taking differently: Take 1 tablet by mouth Daily.) 30 tablet 2 Past Week    Fulvestrant (FASLODEX) 250 MG/5ML chemo syringe Inject 0.02 mL into the appropriate muscle as directed by prescriber Every 30 (Thirty) Days. In MD office  Next due: 10/17/23   9/19/2023    gabapentin (Neurontin) 300 MG capsule Take 1 capsule by mouth Every Night. 60 capsule 2 10/12/2023 at 2200    insulin detemir (Levemir FlexPen) 100 UNIT/ML injection Inject 50 Units under the skin into the appropriate area as directed Daily. 11 mL 0 Past Week    levothyroxine (SYNTHROID, LEVOTHROID) 50 MCG tablet Take 1 tablet by mouth Daily. (Patient taking differently: Take 1 tablet by mouth Every Morning.) 90 tablet 1 10/9/2023    mupirocin (BACTROBAN) 2 % ointment Apply 1 application  topically to the appropriate area as directed Daily. Applied to fistula insertion sites for dialysis   10/12/2023    ondansetron (ZOFRAN) 8 MG tablet TAKE ONE TABLET BY MOUTH EVERY 8 HOURS AS NEEDED FOR NAUSEA OR VOMITING (Patient taking differently: Take 1 tablet by mouth Every 8 (Eight) Hours As Needed.) 18 tablet 2 10/12/2023    potassium chloride ER (K-TAB) 20 MEQ tablet controlled-release ER tablet Take 1 tablet by mouth Daily.   10/11/2023    sertraline (ZOLOFT) 100 MG tablet TAKE 1 AND 1/2 TABLET BY MOUTH DAILY (Patient taking differently: Take 1.5 tablets by mouth Daily.) 135 tablet 0 10/12/2023 at 2200    vitamin B-12 (CYANOCOBALAMIN) 1000 MCG tablet Take 1 tablet by mouth Daily.   10/9/2023 at 0900       Current Meds  Scheduled Meds:Abemaciclib, 100 mg, Oral, BID  atorvastatin, 40 mg, Oral,  Nightly  cefTRIAXone, 2,000 mg, Intravenous, Q24H  [START ON 10/16/2023] epoetin jana/jana-epbx, 20,000 Units, Subcutaneous, Once per day on   folic acid, 1 mg, Oral, Daily  Fulvestrant, 1 mg, Intramuscular, Q30 Days  gabapentin, 300 mg, Oral, Nightly  insulin glargine, 50 Units, Subcutaneous, Nightly  insulin lispro, 2-9 Units, Subcutaneous, Q4H  levothyroxine, 50 mcg, Oral, Q AM  midodrine, 10 mg, Oral, TID AC  multivitamin, 1 tablet, Oral, Daily  mupirocin, 1 application , Topical, Daily  potassium chloride, 20 mEq, Oral, Daily  potassium chloride, 20 mEq, Oral, Daily  senna-docusate sodium, 2 tablet, Oral, BID  sertraline, 150 mg, Oral, Daily  sodium chloride, 10 mL, Intravenous, Q12H  vitamin B-12, 1,000 mcg, Oral, Daily      Continuous Infusions:   PRN Meds:.  acetaminophen **OR** acetaminophen **OR** acetaminophen    acetaminophen **OR** acetaminophen    acetaminophen    senna-docusate sodium **AND** polyethylene glycol **AND** bisacodyl **AND** bisacodyl    dextrose    dextrose    diphenoxylate-atropine    glucagon (human recombinant)    HYDROcodone-acetaminophen    influenza vaccine    nitroglycerin    ondansetron **OR** ondansetron    [COMPLETED] Insert Peripheral IV **AND** sodium chloride    sodium chloride    sodium chloride    sodium chloride    Allergies as of 10/13/2023    (No Known Allergies)       Social History     Socioeconomic History    Marital status:      Spouse name: Rk   Tobacco Use    Smoking status: Never     Passive exposure: Never    Smokeless tobacco: Never   Vaping Use    Vaping Use: Never used   Substance and Sexual Activity    Alcohol use: No    Drug use: Never    Sexual activity: Yes     Partners: Male     Birth control/protection: Post-menopausal       Family History   Problem Relation Age of Onset    Heart attack Mother 72    Coronary artery disease Mother         Mother  from MI at 72    Diabetes Mother     Breast cancer Mother     Hyperlipidemia  "Mother     Arthritis Mother     Cancer Mother     Heart attack Father 74    Aortic aneurysm Father         Father with ruptured thoracic aortic aneurysm    Coronary artery disease Father         Father  from MI at 74    Heart disease Father     Hyperlipidemia Father     Arthritis Father     Heart attack Maternal Grandmother 76    Coronary artery disease Maternal Grandmother         MGM deceeased from MI at age 76    Malig Hyperthermia Neg Hx        REVIEW OF SYSTEMS:   12 point ROS was performed and is negative except as outlined in HPI         Objective:   Temp:  [97.6 °F (36.4 °C)-98.4 °F (36.9 °C)] 97.8 °F (36.6 °C)  Heart Rate:  [70-92] 80  Resp:  [16-20] 17  BP: ()/(31-74) 91/45  Body mass index is 60.68 kg/m².  Flowsheet Rows      Flowsheet Row First Filed Value   Admission Height 170.2 cm (67\") Documented at 10/13/2023 1251   Admission Weight 154 kg (340 lb) Documented at 10/13/2023 1251          Vitals:    10/14/23 1145   BP: 91/45   Pulse: 80   Resp:    Temp:    SpO2: 95%     Lying in bed, no acute distress, but appears ill.  On no supplemental oxygen.  There is a loud systolic murmur easily heard.  Abdomen is distended, nontender.  Rhythm is regular  She is alert and oriented, mild distress            Lab Review:      Results from last 7 days   Lab Units 10/14/23  0129   SODIUM mmol/L 139   POTASSIUM mmol/L 2.8*   CHLORIDE mmol/L 99   CO2 mmol/L 27.0   BUN mg/dL 24*   CREATININE mg/dL 5.23*   CALCIUM mg/dL 8.7   BILIRUBIN mg/dL 0.4   ALK PHOS U/L 121*   ALT (SGPT) U/L 13   AST (SGOT) U/L 14   GLUCOSE mg/dL 101*     Results from last 7 days   Lab Units 10/13/23  1259 10/13/23  1108   HSTROP T ng/L 51* 51*     @LABRCNTbnp@  Results from last 7 days   Lab Units 10/14/23  0129 10/13/23  1108   WBC 10*3/mm3 3.42 5.84   HEMOGLOBIN g/dL 7.5* 8.7*   HEMATOCRIT % 23.5* 26.2*   PLATELETS 10*3/mm3 105* 123*     Results from last 7 days   Lab Units 10/13/23  1108   INR  1.36*     Results from last 7 days "   Lab Units 10/13/23  1108   MAGNESIUM mg/dL 1.7               @LABRCNTIP(chol,trig,hdl,ldl)    I personally viewed and interpreted the patient's EKG/Telemetry data  )  Hypotension    Diabetes type 2, controlled    Acquired hypothyroidism    Primary malignant neoplasm of breast with metastasis    Anemia in stage 3 chronic kidney disease    Morbid obesity with BMI of 50.0-59.9, adult    ESRD (end stage renal disease)    Combined systolic and diastolic congestive heart failure    ANDREW (obstructive sleep apnea)    Diastolic CHF, chronic    Abnormal urinalysis    Hypokalemia    Assessment and Plan:    I have ordered an echo to assess the aortic valve gradients.    Even if she were to be found to have recurrent severe stenosis, she would not be a candidate for TAVR, and I am not even sure she would be a candidate for valvuloplasty, but that could be discussed with the structural heart team.    Donovan Stover MD  10/14/23  14:31 EDT.  Time spent in reviewing chart, discussion and examination:

## 2023-10-14 NOTE — CONSULTS
Thank you very much for the consult    Reason For The Consult: ESRD management recommendation   History of presenting illness:  Patient is a 63-year-old pleasant white female with history of type 2 diabetes obesity dyslipidemia history of end-stage renal disease history of hypothyroidism patient dialysis or home hemodialysis.  Patient has history of large ascites status post paracentesis of 11 L, patient report patient did not receive any replacement albumin.  She developed hypotension and multiple falls at home [place] and was found to be hypotensive received fluid resuscitation.  Feels better today  PMH:   Past Medical History:   Diagnosis Date    Anemia     Anxiety and depression     Bicuspid aortic valve     had surgery    Breast cancer 2004    RIGHT, HAD NEELA. 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    History of sepsis 2010    KIDNEY STONE    History of transfusion     no reaction    Hyperlipidemia     Hyperthyroidism     Hypothyroidism     Kidney stone     CURRENT LEFT-DEC 2021    Lymphedema of leg     LEFT    Metastasis from breast cancer 2019    STOMACH-OMENTUM    Neuromuscular disorder     Obesity     ANDREW on CPAP     CPAP    Osteoarthrosis     Peripheral neuropathy     FEET BILAT    Radiculopathy     Rectal bleeding 08/16/2022    Thickened endometrium     Urinary incontinence     wears pads    Weakness     BILAT LEGS-WITH ANY AMT OF TIME      Past Surgical History:   Procedure  Laterality Date    AORTIC VALVULOPLASTY  2023    ARTERIOVENOUS FISTULA/SHUNT SURGERY Left 2021    Procedure: LEFT  BRACHIOCEPALIC ARTERIOVENOUS FISTULA;  Surgeon: Dejuan Adler MD;  Location: [facility] MAIN OR;  Service: Vascular;  Laterality: Left;    BREAST RECONSTRUCTION      WITH IMPLANTS, AND REVISION, NOW REMOVED    BREAST SURGERY Bilateral     breast implants removed and then replaced due to infection    CARDIAC CATHETERIZATION N/A 2022    Procedure: CORONARY ANGIOGRAPHY;  Surgeon: Luis Rivers MD;  Location:  [facility];  Service: Cardiology;  Laterality: N/A;    CARDIAC CATHETERIZATION N/A 2022    Procedure: Right Heart Cath;  Surgeon: Luis Rivers MD;  Location:  [facility];  Service: Cardiology;  Laterality: N/A;    CARDIAC CATHETERIZATION N/A 01/10/2023    Procedure: Valvuloplasty;  Surgeon: Luis Rivers MD;  Location:  [facility];  Service: Cardiovascular;  Laterality: N/A;  01/10/2023    CARDIAC CATHETERIZATION N/A 01/10/2023    Procedure: Aortic root aortogram;  Surgeon: Luis Rivers MD;  Location:  [facility];  Service: Cardiovascular;  Laterality: N/A;    CATARACT EXTRACTION WITH INTRAOCULAR LENS IMPLANT Bilateral      SECTION  1987     SECTION  1987    CYSTOSCOPY W/ URETERAL STENT PLACEMENT  2010    CYSTOSCOPY W/ URETERAL STENT PLACEMENT Left 2021    Procedure: CYSTOSCOPY KEFT RETROGRADE PYELOGRAM  LEFT  URETERAL STENT PLACEMENT;  Surgeon: Leodan Mckee Jr., MD;  Location: [facility] MAIN OR;  Service: Urology;  Laterality: Left;    CYSTOSCOPY W/ URETERAL STENT PLACEMENT Left 03/10/2022    Procedure: CYSTOSCOPY AND LEFT URETERAL STENT EXCHANGE, RETROGRADE PYELOGRAM;  Surgeon: Leodan Mckee Jr., MD;  Location: [facility] MAIN OR;  Service: Urology;  Laterality: Left;    CYSTOSCOPY W/ URETERAL STENT  PLACEMENT Left 2023    Procedure: CYSTOSCOPY WITH LEFT URETERAL STENT PLACEMENT, RETROGRADE PYELOGRAM, CLOT EVACUATION, BLADDER FULGARATION;  Surgeon: Leodan Mckee Jr., MD;  Location: [facility] MAIN OR;  Service: Urology;  Laterality: Left;    D & C HYSTEROSCOPY N/A 2017    Procedure: DILATATION AND CURETTAGE, HYSTEROSCOPY ;  Surgeon: Cherie Oliveros MD;  Location:  [facility];  Service:     D & C HYSTEROSCOPY N/A 2019    Procedure: DILATATION AND CURETTAGE HYSTEROSCOPY/POLYPECTOMY;  Surgeon: Cherie Oliveros MD;  Location:  [facility];  Service: Gynecology    D & C HYSTEROSCOPY ENDOMETRIAL ABLATION N/A 2023    Procedure: DILATATION AND CURETTAGE;  Surgeon: Ina Marcos MD;  Location: [facility] MAIN OR;  Service: Obstetrics/Gynecology;  Laterality: N/A;    ENDOSCOPY      INSERTION AND REMOVAL HEMODIALYSIS CATHETER Right 2021    INSERTION HEMODIALYSIS CATHETER Right 2021    Procedure: HEMODIALYSIS CATHETER INSERTION;  Surgeon: Clint Hernández MD;  Location: [facility] MAIN OR;  Service: Vascular;  Laterality: Right;    LYMPH NODE BIOPSY      MASTECTOMY Bilateral 2004    VENOUS ACCESS DEVICE (PORT) INSERTION AND REMOVAL       FHX:   Family History   Problem Relation Age of Onset    Heart attack Mother 72    Coronary artery disease Mother         Mother  from MI at 72    Diabetes Mother     Breast cancer Mother     Hyperlipidemia Mother     Arthritis Mother     Cancer Mother     Heart attack Father 74    Aortic aneurysm Father         Father with ruptured thoracic aortic aneurysm    Coronary artery disease Father         Father  from MI at 74    Heart disease Father     Hyperlipidemia Father     Arthritis Father     Heart attack Maternal Grandmother 76    Coronary artery disease Maternal Grandmother         MGM deceeased from MI at age 76    Malig Hyperthermia Neg Hx      SHX:   Social History     Substance and Sexual Activity   Alcohol Use No      Social History      Tobacco Use   Smoking Status Never    Passive exposure: Never   Smokeless Tobacco Never      Social History     Substance and Sexual Activity   Drug Use Never   No    Home Medications:   Medications Prior to Admission   Medication Sig Dispense Refill Last Dose    Abemaciclib (Verzenio) 100 MG tablet Take 1 tablet by mouth 2 (Two) Times a Day. 56 tablet 5 10/12/2023    acetaminophen (TYLENOL) 500 MG tablet Take 2 tablets by mouth As Needed for Mild Pain.   10/12/2023    atorvastatin (LIPITOR) 40 MG tablet TAKE ONE TABLET BY MOUTH ONCE NIGHTLY (Patient taking differently: Take 1 tablet by mouth Every Night.) 30 tablet 0 Past Week    diphenoxylate-atropine (LOMOTIL) 2.5-0.025 MG per tablet Take 1 tablet by mouth 4 (Four) Times a Day As Needed for Diarrhea. 30 tablet 0 Past Month    folic acid (FOLVITE) 1 MG tablet TAKE 1 TABLET BY MOUTH DAILY (Patient taking differently: Take 1 tablet by mouth Daily.) 30 tablet 2 Past Week    Fulvestrant (FASLODEX) 250 MG/5ML chemo syringe Inject 0.02 mL into the appropriate muscle as directed by prescriber Every 30 (Thirty) Days. In MD office  Next due: 10/17/23   9/19/2023    gabapentin (Neurontin) 300 MG capsule Take 1 capsule by mouth Every Night. 60 capsule 2 10/12/2023 at 2200    insulin detemir (Levemir FlexPen) 100 UNIT/ML injection Inject 50 Units under the skin into the appropriate area as directed Daily. 11 mL 0 Past Week    levothyroxine (SYNTHROID, LEVOTHROID) 50 MCG tablet Take 1 tablet by mouth Daily. (Patient taking differently: Take 1 tablet by mouth Every Morning.) 90 tablet 1 10/9/2023    mupirocin (BACTROBAN) 2 % ointment Apply 1 application  topically to the appropriate area as directed Daily. Applied to fistula insertion sites for dialysis   10/12/2023    ondansetron (ZOFRAN) 8 MG tablet TAKE ONE TABLET BY MOUTH EVERY 8 HOURS AS NEEDED FOR NAUSEA OR VOMITING (Patient taking differently: Take 1 tablet by mouth Every 8 (Eight) Hours As Needed.) 18 tablet 2  10/12/2023    potassium chloride ER (K-TAB) 20 MEQ tablet controlled-release ER tablet Take 1 tablet by mouth Daily.   10/11/2023    sertraline (ZOLOFT) 100 MG tablet TAKE 1 AND 1/2 TABLET BY MOUTH DAILY (Patient taking differently: Take 1.5 tablets by mouth Daily.) 135 tablet 0 10/12/2023 at 2200    vitamin B-12 (CYANOCOBALAMIN) 1000 MCG tablet Take 1 tablet by mouth Daily.   10/9/2023 at 0900       Allergies: No Known Allergies    Physical Exam:  General: In no acute distress    Vital Signs  Vitals:    10/14/23 1145   BP: 91/45   Pulse: 80   Resp:    Temp:    SpO2: 95%     No intake/output data recorded.  I/O last 3 completed shifts:  In: 755.7 [I.V.:249.9; Blood:405.9; IV Piggyback:100]  Out: 200 [Urine:200]      HEENT:  Normocephalic, Atraumatic, EOMI, PERRLA, NO icterus,  Neck:  Supple, No JVD, No Carotid artery bruit, No lymphadenopathy  Chest: Clear to auscultation bilaterally,   Cardiovascular: Regular rate and rhythm. Positive S1 & S2, No rub, murmur or gallop.  Abdominal: Soft, nontender, no palpable organomegaly, no abdominal bruit, positive bowel sounds, positive fluid thrill  Musculoskeletal: No joint tenderness or swelling, good range of motion, Adequate muscle strength on both sides, no cyanosis, clubbing or edema on lower extremities.  Neuro: Alert oriented x3, CN1 - 12 intact, No focal sensory or motor deficit.      Review of Systems:   CNS: Denied headaches, blurred vision, tingling or numbness in any part of body. No weakness or any impaired speech.  CVS: No chest pain or shortness of breath, No orthopnea or PND or exertional dyspnea,  Pulmonary: Denies shortness of breath, coughs, hematemesis, night sweats or weight loss.  GI: Denies diarrhea, nausea, vomiting. Denies weight loss, hematemesis, melena.  : Denies dysuria, frequency or hesitancy of urination  Vascular: Denies claudication, resting pain, tingling numbness or weakness in any part of the body.  Musculoskeletal: Denies Joint  tenderness or pain, denies stiffness in joints, denies fever or weight loss, or skin rashes.    Current Labs  Lab Results (last 24 hours)       Procedure Component Value Units Date/Time    Urine Culture - Urine, Straight Cath [[id number]]  (Abnormal) Collected: 10/13/23 1210    Specimen: Urine from Straight Cath Updated: 10/14/23 1219     Urine Culture >100,000 CFU/mL Gram Negative Bacilli    Narrative:      Colonization of the urinary tract without infection is common. Treatment is discouraged unless the patient is symptomatic, pregnant, or undergoing an invasive urologic procedure.    POC Glucose Once [[id number]]  (Normal) Collected: 10/14/23 1150    Specimen: Blood Updated: 10/14/23 1152     Glucose 75 mg/dL     POC Glucose Once [[id number]]  (Normal) Collected: 10/14/23 1006    Specimen: Blood Updated: 10/14/23 1007     Glucose 107 mg/dL     STAT Lactic Acid, Reflex [[id number]]  (Normal) Collected: 10/14/23 0910    Specimen: Blood Updated: 10/14/23 0950     Lactate 1.6 mmol/L     POC Glucose Once [[id number]]  (Abnormal) Collected: 10/14/23 0944    Specimen: Blood Updated: 10/14/23 0947     Glucose 62 mg/dL     POC Glucose Once [[id number]]  (Abnormal) Collected: 10/14/23 0850    Specimen: Blood Updated: 10/14/23 0851     Glucose 63 mg/dL     POC Glucose Once [[id number]]  (Normal) Collected: 10/14/23 0404    Specimen: Blood Updated: 10/14/23 0406     Glucose 79 mg/dL     Cortisol [[id number]] Collected: 10/14/23 0129    Specimen: Blood Updated: 10/14/23 0205     Cortisol 18.80 mcg/dL     Narrative:      Cortisol Reference Ranges:    Cortisol 6AM - 10AM Range: 6.02-18.40 mcg/dl  Cortisol 4PM - 8PM Range: 2.68-10.50 mcg/dl      Results may be falsely increased if patient taking Biotin.      STAT Lactic Acid, Reflex [[id number]]  (Abnormal) Collected: 10/14/23 0129    Specimen: Blood Updated: 10/14/23 0203     Lactate 2.7 mmol/L     Comprehensive Metabolic Panel [[id number]]  (Abnormal) Collected: 10/14/23 0129     Specimen: Blood Updated: 10/14/23 0159     Glucose 101 mg/dL      BUN 24 mg/dL      Creatinine 5.23 mg/dL      Sodium 139 mmol/L      Potassium 2.8 mmol/L      Chloride 99 mmol/L      CO2 27.0 mmol/L      Calcium 8.7 mg/dL      Total Protein 5.5 g/dL      Albumin 2.0 g/dL      ALT (SGPT) 13 U/L      AST (SGOT) 14 U/L      Alkaline Phosphatase 121 U/L      Total Bilirubin 0.4 mg/dL      Globulin 3.5 gm/dL      A/G Ratio 0.6 g/dL      BUN/Creatinine Ratio 4.6     Anion Gap 13.0 mmol/L      eGFR 8.6 mL/min/1.73      Comment: <15 Indicative of kidney failure       Narrative:      GFR Normal >60  Chronic Kidney Disease <60  Kidney Failure <15      Hemoglobin A1c [[id number]]  (Abnormal) Collected: 10/14/23 0129    Specimen: Blood Updated: 10/14/23 0152     Hemoglobin A1C 6.00 %     Narrative:      Hemoglobin A1C Ranges:    Increased Risk for Diabetes  5.7% to 6.4%  Diabetes                     >= 6.5%  Diabetic Goal                < 7.0%    Blood Culture - Blood, Arm, Right [[id number]] Collected: 10/14/23 0143    Specimen: Blood from Arm, Right Updated: 10/14/23 0149    CBC Auto Differential [[id number]]  (Abnormal) Collected: 10/14/23 0129    Specimen: Blood Updated: 10/14/23 0140     WBC 3.42 10*3/mm3      RBC 2.40 10*6/mm3      Hemoglobin 7.5 g/dL      Hematocrit 23.5 %      MCV 97.9 fL      MCH 31.3 pg      MCHC 31.9 g/dL      RDW 15.1 %      RDW-SD 53.5 fl      MPV 10.5 fL      Platelets 105 10*3/mm3      Neutrophil % 77.4 %      Lymphocyte % 12.3 %      Monocyte % 6.7 %      Eosinophil % 1.5 %      Basophil % 0.9 %      Immature Grans % 1.2 %      Neutrophils, Absolute 2.65 10*3/mm3      Lymphocytes, Absolute 0.42 10*3/mm3      Monocytes, Absolute 0.23 10*3/mm3      Eosinophils, Absolute 0.05 10*3/mm3      Basophils, Absolute 0.03 10*3/mm3      Immature Grans, Absolute 0.04 10*3/mm3      nRBC 0.3 /100 WBC     POC Glucose Once [[id number]]  (Abnormal) Collected: 10/13/23 2342    Specimen: Blood Updated: 10/13/23 2344      Glucose 240 mg/dL     Blood Culture - Blood, Arm, Right [[id number]] Collected: 10/13/23 2251    Specimen: Blood from Arm, Right Updated: 10/13/23 2330    STAT Lactic Acid, Reflex [[id number]]  (Abnormal) Collected: 10/13/23 2138    Specimen: Blood Updated: 10/13/23 2235     Lactate 2.8 mmol/L     POC Glucose Once [[id number]]  (Normal) Collected: 10/13/23 2029    Specimen: Blood Updated: 10/13/23 2030     Glucose 116 mg/dL     STAT Lactic Acid, Reflex [[id number]]  (Abnormal) Collected: 10/13/23 1853    Specimen: Blood Updated: 10/13/23 1944     Lactate 3.5 mmol/L     POC Glucose Once [[id number]]  (Normal) Collected: 10/13/23 1645    Specimen: Blood Updated: 10/13/23 1648     Glucose 128 mg/dL     Lactic Acid, Plasma [[id number]]  (Abnormal) Collected: 10/13/23 1431    Specimen: Blood Updated: 10/13/23 1523     Lactate 3.4 mmol/L           Current Radiology  Imaging Results (Last 24 Hours)       Procedure Component Value Units Date/Time    CT Head Without Contrast [[id number]] Collected: 10/13/23 1207     Updated: 10/13/23 1640    Narrative:      EMERGENCY CT SCAN OF THE HEAD WITHOUT CONTRAST ON 10/13/2023     CLINICAL HISTORY: Patient has weakness and has had multiple falls over  the last 4 days.     TECHNIQUE: Spiral CT images were obtained from the base of the skull to  the vertex without intravenous contrast. The images were reformatted and  are submitted in 3 mm thick axial, sagittal and coronal CT sections with  brain algorithm and 2 mm thick axial CT sections with high-resolution  bone algorithm.     There are no prior head CTs for comparison.     FINDINGS: There is some mild low-density in the periventricular white  matter consistent with mild small vessel disease. The remainder of the  brain parenchyma is normal in attenuation. The ventricles are normal in  size. I see no focal mass effect. There is no midline shift. No  extra-axial fluid collections are identified and there is no evidence of  acute  intracranial hemorrhage. No acute skull fracture is identified.  The calvarium and skull base are normal in appearance. Paranasal  sinuses, mastoid air cells and middle ear cavities are clear.       Impression:         1. No acute intracranial abnormality is identified. Other than mild  small vessel disease in the cerebral white matter, the remainder the  head CT is within normal limits.     Radiation dose reduction techniques were utilized, including automated  exposure control and exposure modulation based on body size.        This report was finalized on 10/13/2023 4:37 PM by Dr. Sandeep Irwin M.D  on Workstation: [id number]             Current Meds    Current Facility-Administered Medications:     Abemaciclib tablet 100 mg, 100 mg, Oral, BID, Moose Pete MD, 100 mg at 10/14/23 0904    acetaminophen (TYLENOL) tablet 650 mg, 650 mg, Oral, Q4H PRN **OR** acetaminophen (TYLENOL) 160 MG/5ML oral solution 650 mg, 650 mg, Oral, Q4H PRN **OR** acetaminophen (TYLENOL) suppository 650 mg, 650 mg, Rectal, Q4H PRN, Moose Pete MD    acetaminophen (TYLENOL) tablet 650 mg, 650 mg, Oral, Q4H PRN **OR** acetaminophen (TYLENOL) suppository 650 mg, 650 mg, Rectal, Q4H PRN, Terra Arreaga APRN    acetaminophen (TYLENOL) tablet 1,000 mg, 1,000 mg, Oral, PRN, Moose Pete MD    atorvastatin (LIPITOR) tablet 40 mg, 40 mg, Oral, Nightly, Moose Pete MD, 40 mg at 10/13/23 2051    sennosides-docusate (PERICOLACE) 8.6-50 MG per tablet 2 tablet, 2 tablet, Oral, BID **AND** polyethylene glycol (MIRALAX) packet 17 g, 17 g, Oral, Daily PRN **AND** bisacodyl (DULCOLAX) EC tablet 5 mg, 5 mg, Oral, Daily PRN **AND** bisacodyl (DULCOLAX) suppository 10 mg, 10 mg, Rectal, Daily PRN, Moose Pete MD    cefTRIAXone (ROCEPHIN) 2,000 mg in sodium chloride 0.9 % 100 mL IVPB-VTB, 2,000 mg, Intravenous, Q24H, Moose Pete MD, Last Rate: 200 mL/hr at 10/14/23 0149, 2,000 mg at 10/14/23 0149    dextrose (D50W) (25 g/50 mL) IV injection 25 g, 25 g,  Intravenous, Q15 Min PRN, Moose Pete MD, 25 g at 10/14/23 0949    dextrose (GLUTOSE) oral gel 15 g, 15 g, Oral, Q15 Min PRN, Moose Pete MD    diphenoxylate-atropine (LOMOTIL) 2.5-0.025 MG per tablet 1 tablet, 1 tablet, Oral, 4x Daily PRN, Moose Pete MD    folic acid (FOLVITE) tablet 1 mg, 1 mg, Oral, Daily, Moose Pete MD, 1 mg at 10/14/23 0901    Fulvestrant (FASLODEX) chemo injection 1 mg, 1 mg, Intramuscular, Q30 Days, Moose Pete MD    gabapentin (NEURONTIN) capsule 300 mg, 300 mg, Oral, Nightly, oMose Pete MD    glucagon (GLUCAGEN) injection 1 mg, 1 mg, Intramuscular, Q15 Min PRN, Moose Pete MD    HYDROcodone-acetaminophen (NORCO) 5-325 MG per tablet 1 tablet, 1 tablet, Oral, Q4H PRN, Moose Pete MD    Influenza Vac High-Dose Quad (FLUZONE HIGH DOSE) injection 0.7 mL, 0.7 mL, Intramuscular, During Hospitalization, Moose Pete MD    insulin glargine (LANTUS, SEMGLEE) injection 50 Units, 50 Units, Subcutaneous, Nightly, Moose Pete MD, 50 Units at 10/13/23 2052    insulin lispro (HUMALOG/ADMELOG) injection 2-9 Units, 2-9 Units, Subcutaneous, Q4H, Cortes Olvera MD    levothyroxine (SYNTHROID, LEVOTHROID) tablet 50 mcg, 50 mcg, Oral, Q AM, Moose Pete MD, 50 mcg at 10/14/23 0635    midodrine (PROAMATINE) tablet 10 mg, 10 mg, Oral, TID Randy ADAMES Sandeep K, MD, 10 mg at 10/14/23 1404    mupirocin (BACTROBAN) 2 % ointment 1 application , 1 application , Topical, Daily, Moose Pete MD, 1 application  at 10/14/23 1037    nitroglycerin (NITROSTAT) SL tablet 0.4 mg, 0.4 mg, Sublingual, Q5 Min PRN, Terra Arreaga, APRN    ondansetron (ZOFRAN) tablet 4 mg, 4 mg, Oral, Q6H PRN **OR** ondansetron (ZOFRAN) injection 4 mg, 4 mg, Intravenous, Q6H PRN, Terra Arreaga, BETY, 4 mg at 10/14/23 1404    potassium chloride (K-DUR,KLOR-CON) ER tablet 20 mEq, 20 mEq, Oral, Daily, Moose Pete MD, 20 mEq at 10/14/23 0923    potassium chloride (KLOR-CON) packet 20 mEq, 20 mEq, Oral, Daily, Moose Pete MD,  20 mEq at 10/13/23 1737    sertraline (ZOLOFT) tablet 150 mg, 150 mg, Oral, Daily, Moose Pete MD, 150 mg at 10/14/23 0901    [COMPLETED] Insert Peripheral IV, , , Once **AND** sodium chloride 0.9 % flush 10 mL, 10 mL, Intravenous, PRN, Moose Pete MD    sodium chloride 0.9 % flush 10 mL, 10 mL, Intravenous, Q12H, Moose Pete MD, 10 mL at 10/14/23 0902    sodium chloride 0.9 % flush 10 mL, 10 mL, Intravenous, PRN, Moose Pete MD    sodium chloride 0.9 % infusion 250 mL, 250 mL, Intravenous, PRN, Leodan Irvin Jr., MD    sodium chloride 0.9 % infusion 40 mL, 40 mL, Intravenous, PRN, Moose Pete MD    vitamin B-12 (CYANOCOBALAMIN) tablet 1,000 mcg, 1,000 mcg, Oral, Daily, Moose Ptee MD, 1,000 mcg at 10/14/23 0901      Assessment and plan:          Hypotension    Diabetes type 2, controlled    Acquired hypothyroidism    Primary malignant neoplasm of breast with metastasis    Anemia in stage 3 chronic kidney disease    Morbid obesity with BMI of 50.0-59.9, adult    ESRD (end stage renal disease)    Combined systolic and diastolic congestive heart failure    ANDREW (obstructive sleep apnea)    Diastolic CHF, chronic    Abnormal urinalysis    Hypokalemia  Patient with the end-stage renal disease continue on dialysis will do dialysis tomorrow morning  Hypotension most likely secondary to large volume paracentesis without replacement of albumin.  We will schedule dialysis tomorrow patient does seem to have urinary tract infection continue treatment per ICU team  Severe anemia check iron studies and start patient on Epogen therapy      Patrick Rowley MD FACP, FASN.  10/14/23  14:17 EDT

## 2023-10-14 NOTE — PROGRESS NOTES
"  Intensive Care Unit Daily Progress Note.   [facility] INTENSIVE CARE  10/14/2023    Patient Name:  Juana Hall  MRN:  [id number]  YOB: 1960  Age: 63 y.o.  Sex: female         Reason for Admission / Chief Complaint:  Generalized weakness, hypotension    Hospital Course:   63-year-old female with metastatic breast cancer, end-stage renal disease on hemodialysis, morbid obesity who was recently admitted for nausea/vomiting and found to have large volume ascites status post 11 L removed on paracentesis on October 9.  Presented to the hospital on October 13 with hypotension and progressive dizziness with multiple falls.    Interval History:  Admitted yesterday for hypotension  Blood pressure low, transfuse 1 unit, started on midodrine  And evaluation patient feels weak and slightly dizzy  States that she has nausea and has been unable to keep any food down for the past week  Denies any chest pain or palpitations  Denies any shortness of breath or cough    Physical Exam:  BP (!) 91/35   Pulse 80   Temp 97.8 °F (36.6 °C) (Oral)   Resp 17   Ht 170.2 cm (67\")   Wt (!) 176 kg (387 lb 6.4 oz)   LMP  (LMP Unknown)   SpO2 93%   BMI 60.68 kg/m²   Body mass index is 60.68 kg/m².    Intake/Output    Intake/Output Summary (Last 24 hours) at 10/14/2023 1136  Last data filed at 10/14/2023 0700  Gross per 24 hour   Intake 755.74 ml   Output 200 ml   Net 555.74 ml     General: Alert, morbidly obese  HEENT: NC/AT, EOMI, MMM  Neck: Supple, trachea midline  Cardiac: RRR, no murmur, gallops, rubs  Pulmonary: Clear to auscultation bilaterally, no adventitious breath sounds, normal respiratory effort  GI: Soft, non-tender, non-distended, distant bowel sounds  Extremities: Warm, well perfused, 1+ LE edema  Skin: no visible rash  Neuro: CN II - XII grossly intact  Psychiatry: Normal mood and affect    Data Review:  Notable Labs:  Results from last 7 days   Lab Units 10/14/23  0129 10/13/23  1108   WBC " 10*3/mm3 3.42 5.84   HEMOGLOBIN g/dL 7.5* 8.7*   PLATELETS 10*3/mm3 105* 123*     Results from last 7 days   Lab Units 10/14/23  0129 10/13/23  1108   SODIUM mmol/L 139 136   POTASSIUM mmol/L 2.8* 2.9*   CHLORIDE mmol/L 99 96*   CO2 mmol/L 27.0 26.4   BUN mg/dL 24* 19   CREATININE mg/dL 5.23* 4.84*   GLUCOSE mg/dL 101* 156*   CALCIUM mg/dL 8.7 8.4*   MAGNESIUM mg/dL  --  1.7   Estimated Creatinine Clearance: 18.1 mL/min (A) (by C-G formula based on SCr of 5.23 mg/dL (H)).    Results from last 7 days   Lab Units 10/14/23  0910 10/14/23  0129 10/13/23  2138 10/13/23  1431 10/13/23  1259 10/13/23  1108   AST (SGOT) U/L  --  14  --   --   --  15   ALT (SGPT) U/L  --  13  --   --   --  14   PROCALCITONIN ng/mL  --   --   --   --  3.83*  --    LACTATE mmol/L 1.6 2.7* 2.8*   < >  --   --    PLATELETS 10*3/mm3  --  105*  --   --   --  123*    < > = values in this interval not displayed.           Imaging:  Reviewed chest images personally from past 3 days    ASSESSMENT  /  PLAN:    Hypotension  Urinary tract infection  End-stage renal disease on hemodialysis 5 times weekly  Hypokalemia  Chronic diastolic and systolic heart failure  Hypothyroidism  Diabetes mellitus  Obstructive sleep apnea on CPAP  Multiple falls  Fractures in left foot/ankle  Morbid obesity (BMI 53.25)    -Transfused and given IV fluids, started on midodrine 10 3 times daily for hypotension  - Blood pressure continues to remain borderline with systolics in the 80s to 90s  -Suspect that hypotension is likely in the setting of infection and recent large-volume paracentesis with fluid shifts  -Continue ceftriaxone for UTI  -Procalcitonin elevated to almost 4   -Marked elevation of BNP which is likely in the setting of renal failure  -Potassium replacement  -Continue sliding scale insulin  -Use home CPAP  -PT OT evaluation    GI prophylaxis: Not indicated  DVT prophylaxis: SCDs  Medeiros catheter: No  Bowel regimen: Ordered  Diet: Cardiac    Discharge: Continue  to monitor in the ICU due to critical status with hypotension requiring intervention, electrolyte disturbances in the setting of renal failure.    Critical Care Time: 34 minutes    Dg Padilla MD  [facility]  Pulmonary and Critical Care Medicine, Interventional Pulmonology    Parts of this note may be an electronic transcription/translation of spoken language to printed text using the Dragon dictation system.

## 2023-10-15 ENCOUNTER — APPOINTMENT (OUTPATIENT)
Dept: GENERAL RADIOLOGY | Facility: HOSPITAL | Age: 63
End: 2023-10-15
Payer: MEDICARE

## 2023-10-15 LAB
ANION GAP SERPL CALCULATED.3IONS-SCNC: 13 MMOL/L (ref 5–15)
AORTIC DIMENSIONLESS INDEX: 0.3 (DI)
BASOPHILS # BLD AUTO: 0.02 10*3/MM3 (ref 0–0.2)
BASOPHILS NFR BLD AUTO: 0.4 % (ref 0–1.5)
BH CV ECHO MEAS - ACS: 1.43 CM
BH CV ECHO MEAS - AO MAX PG: 88 MMHG
BH CV ECHO MEAS - AO MEAN PG: 47.7 MMHG
BH CV ECHO MEAS - AO ROOT DIAM: 3.1 CM
BH CV ECHO MEAS - AO V2 MAX: 469.1 CM/SEC
BH CV ECHO MEAS - AO V2 VTI: 96.8 CM
BH CV ECHO MEAS - AVA(I,D): 0.94 CM2
BH CV ECHO MEAS - CONTRAST EF 4CH: 58 CM2
BH CV ECHO MEAS - EDV(CUBED): 176.6 ML
BH CV ECHO MEAS - EDV(MOD-SP2): 166 ML
BH CV ECHO MEAS - EDV(MOD-SP4): 139 ML
BH CV ECHO MEAS - EF(MOD-BP): 58.4 %
BH CV ECHO MEAS - EF(MOD-SP2): 56.6 %
BH CV ECHO MEAS - EF(MOD-SP4): 59.7 %
BH CV ECHO MEAS - ESV(CUBED): 25.8 ML
BH CV ECHO MEAS - ESV(MOD-SP2): 72 ML
BH CV ECHO MEAS - ESV(MOD-SP4): 56 ML
BH CV ECHO MEAS - FS: 47.3 %
BH CV ECHO MEAS - IVS/LVPW: 1.1 CM
BH CV ECHO MEAS - IVSD: 1.11 CM
BH CV ECHO MEAS - LAT PEAK E' VEL: 8.1 CM/SEC
BH CV ECHO MEAS - LV MASS(C)D: 238.1 GRAMS
BH CV ECHO MEAS - LV MAX PG: 6.4 MMHG
BH CV ECHO MEAS - LV MEAN PG: 3.6 MMHG
BH CV ECHO MEAS - LV V1 MAX: 126.8 CM/SEC
BH CV ECHO MEAS - LV V1 VTI: 26.7 CM
BH CV ECHO MEAS - LVIDD: 5.6 CM
BH CV ECHO MEAS - LVIDS: 3 CM
BH CV ECHO MEAS - LVOT AREA: 3.4 CM2
BH CV ECHO MEAS - LVOT DIAM: 2.08 CM
BH CV ECHO MEAS - LVPWD: 1.01 CM
BH CV ECHO MEAS - MV A DUR: 0.16 SEC
BH CV ECHO MEAS - MV A MAX VEL: 121.1 CM/SEC
BH CV ECHO MEAS - MV DEC SLOPE: 577.2 CM/SEC2
BH CV ECHO MEAS - MV DEC TIME: 359 SEC
BH CV ECHO MEAS - MV E MAX VEL: 142 CM/SEC
BH CV ECHO MEAS - MV E/A: 1.17
BH CV ECHO MEAS - MV MAX PG: 9.6 MMHG
BH CV ECHO MEAS - MV MEAN PG: 3.5 MMHG
BH CV ECHO MEAS - MV P1/2T: 81.5 MSEC
BH CV ECHO MEAS - MV V2 VTI: 45 CM
BH CV ECHO MEAS - MVA(P1/2T): 2.7 CM2
BH CV ECHO MEAS - MVA(VTI): 2.02 CM2
BH CV ECHO MEAS - PA ACC TIME: 0.1 SEC
BH CV ECHO MEAS - PA V2 MAX: 144.4 CM/SEC
BH CV ECHO MEAS - RAP SYSTOLE: 3 MMHG
BH CV ECHO MEAS - RV MAX PG: 5.3 MMHG
BH CV ECHO MEAS - RV V1 MAX: 115.1 CM/SEC
BH CV ECHO MEAS - RV V1 VTI: 22.9 CM
BH CV ECHO MEAS - RVSP: 45.5 MMHG
BH CV ECHO MEAS - SV(LVOT): 90.7 ML
BH CV ECHO MEAS - SV(MOD-SP2): 94 ML
BH CV ECHO MEAS - SV(MOD-SP4): 83 ML
BH CV ECHO MEAS - TAPSE (>1.6): 2.9 CM
BH CV ECHO MEAS - TR MAX PG: 42.5 MMHG
BH CV ECHO MEAS - TR MAX VEL: 325.8 CM/SEC
BH CV VAS BP LEFT ARM: NORMAL MMHG
BH CV XLRA - TDI S': 18.7 CM/SEC
BUN SERPL-MCNC: 31 MG/DL (ref 8–23)
BUN/CREAT SERPL: 4.7 (ref 7–25)
CALCIUM SPEC-SCNC: 8.3 MG/DL (ref 8.6–10.5)
CHLORIDE SERPL-SCNC: 99 MMOL/L (ref 98–107)
CO2 SERPL-SCNC: 27 MMOL/L (ref 22–29)
CREAT SERPL-MCNC: 6.6 MG/DL (ref 0.57–1)
DEPRECATED RDW RBC AUTO: 57.2 FL (ref 37–54)
EGFRCR SERPLBLD CKD-EPI 2021: 6.5 ML/MIN/1.73
EOSINOPHIL # BLD AUTO: 0.02 10*3/MM3 (ref 0–0.4)
EOSINOPHIL NFR BLD AUTO: 0.4 % (ref 0.3–6.2)
ERYTHROCYTE [DISTWIDTH] IN BLOOD BY AUTOMATED COUNT: 16.2 % (ref 12.3–15.4)
FERRITIN SERPL-MCNC: 943 NG/ML (ref 13–150)
GLUCOSE BLDC GLUCOMTR-MCNC: 109 MG/DL (ref 70–130)
GLUCOSE BLDC GLUCOMTR-MCNC: 115 MG/DL (ref 70–130)
GLUCOSE BLDC GLUCOMTR-MCNC: 121 MG/DL (ref 70–130)
GLUCOSE BLDC GLUCOMTR-MCNC: 46 MG/DL (ref 70–130)
GLUCOSE BLDC GLUCOMTR-MCNC: 48 MG/DL (ref 70–130)
GLUCOSE BLDC GLUCOMTR-MCNC: 57 MG/DL (ref 70–130)
GLUCOSE BLDC GLUCOMTR-MCNC: 60 MG/DL (ref 70–130)
GLUCOSE BLDC GLUCOMTR-MCNC: 65 MG/DL (ref 70–130)
GLUCOSE BLDC GLUCOMTR-MCNC: 86 MG/DL (ref 70–130)
GLUCOSE BLDC GLUCOMTR-MCNC: 89 MG/DL (ref 70–130)
GLUCOSE BLDC GLUCOMTR-MCNC: 91 MG/DL (ref 70–130)
GLUCOSE BLDC GLUCOMTR-MCNC: 98 MG/DL (ref 70–130)
GLUCOSE SERPL-MCNC: 76 MG/DL (ref 65–99)
HCT VFR BLD AUTO: 25.7 % (ref 34–46.6)
HGB BLD-MCNC: 8.2 G/DL (ref 12–15.9)
IMM GRANULOCYTES # BLD AUTO: 0.07 10*3/MM3 (ref 0–0.05)
IMM GRANULOCYTES NFR BLD AUTO: 1.5 % (ref 0–0.5)
IRON 24H UR-MRATE: 46 MCG/DL (ref 37–145)
IRON SATN MFR SERPL: 46 % (ref 20–50)
LYMPHOCYTES # BLD AUTO: 0.44 10*3/MM3 (ref 0.7–3.1)
LYMPHOCYTES NFR BLD AUTO: 9.2 % (ref 19.6–45.3)
MCH RBC QN AUTO: 30.9 PG (ref 26.6–33)
MCHC RBC AUTO-ENTMCNC: 31.9 G/DL (ref 31.5–35.7)
MCV RBC AUTO: 97 FL (ref 79–97)
MONOCYTES # BLD AUTO: 0.36 10*3/MM3 (ref 0.1–0.9)
MONOCYTES NFR BLD AUTO: 7.5 % (ref 5–12)
NEUTROPHILS NFR BLD AUTO: 3.88 10*3/MM3 (ref 1.7–7)
NEUTROPHILS NFR BLD AUTO: 81 % (ref 42.7–76)
NRBC BLD AUTO-RTO: 0 /100 WBC (ref 0–0.2)
PLATELET # BLD AUTO: 92 10*3/MM3 (ref 140–450)
PMV BLD AUTO: 10.8 FL (ref 6–12)
POTASSIUM SERPL-SCNC: 3 MMOL/L (ref 3.5–5.2)
RBC # BLD AUTO: 2.65 10*6/MM3 (ref 3.77–5.28)
SINUS: 3.1 CM
SODIUM SERPL-SCNC: 139 MMOL/L (ref 136–145)
STJ: 3.2 CM
TIBC SERPL-MCNC: 100 MCG/DL (ref 298–536)
TRANSFERRIN SERPL-MCNC: 67 MG/DL (ref 200–360)
WBC NRBC COR # BLD: 4.79 10*3/MM3 (ref 3.4–10.8)

## 2023-10-15 PROCEDURE — 83540 ASSAY OF IRON: CPT | Performed by: INTERNAL MEDICINE

## 2023-10-15 PROCEDURE — 85025 COMPLETE CBC W/AUTO DIFF WBC: CPT | Performed by: HOSPITALIST

## 2023-10-15 PROCEDURE — 74018 RADEX ABDOMEN 1 VIEW: CPT

## 2023-10-15 PROCEDURE — 25010000002 ALBUMIN HUMAN 25% PER 50 ML: Performed by: INTERNAL MEDICINE

## 2023-10-15 PROCEDURE — 97162 PT EVAL MOD COMPLEX 30 MIN: CPT

## 2023-10-15 PROCEDURE — 25010000002 DIPHENHYDRAMINE PER 50 MG: Performed by: INTERNAL MEDICINE

## 2023-10-15 PROCEDURE — 93005 ELECTROCARDIOGRAM TRACING: CPT | Performed by: INTERNAL MEDICINE

## 2023-10-15 PROCEDURE — 25010000002 CEFTRIAXONE PER 250 MG: Performed by: INTERNAL MEDICINE

## 2023-10-15 PROCEDURE — 99232 SBSQ HOSP IP/OBS MODERATE 35: CPT | Performed by: INTERNAL MEDICINE

## 2023-10-15 PROCEDURE — 5A1D70Z PERFORMANCE OF URINARY FILTRATION, INTERMITTENT, LESS THAN 6 HOURS PER DAY: ICD-10-PCS | Performed by: INTERNAL MEDICINE

## 2023-10-15 PROCEDURE — 82948 REAGENT STRIP/BLOOD GLUCOSE: CPT

## 2023-10-15 PROCEDURE — 82728 ASSAY OF FERRITIN: CPT | Performed by: INTERNAL MEDICINE

## 2023-10-15 PROCEDURE — 97166 OT EVAL MOD COMPLEX 45 MIN: CPT

## 2023-10-15 PROCEDURE — 93010 ELECTROCARDIOGRAM REPORT: CPT | Performed by: INTERNAL MEDICINE

## 2023-10-15 PROCEDURE — 25010000002 METOCLOPRAMIDE PER 10 MG: Performed by: HOSPITALIST

## 2023-10-15 PROCEDURE — 84466 ASSAY OF TRANSFERRIN: CPT | Performed by: INTERNAL MEDICINE

## 2023-10-15 PROCEDURE — 97110 THERAPEUTIC EXERCISES: CPT

## 2023-10-15 PROCEDURE — 25010000002 ONDANSETRON PER 1 MG

## 2023-10-15 PROCEDURE — P9047 ALBUMIN (HUMAN), 25%, 50ML: HCPCS | Performed by: INTERNAL MEDICINE

## 2023-10-15 PROCEDURE — 80048 BASIC METABOLIC PNL TOTAL CA: CPT | Performed by: HOSPITALIST

## 2023-10-15 PROCEDURE — 25010000002 ONDANSETRON PER 1 MG: Performed by: HOSPITALIST

## 2023-10-15 PROCEDURE — 25010000002 PROCHLORPERAZINE 10 MG/2ML SOLUTION: Performed by: INTERNAL MEDICINE

## 2023-10-15 RX ORDER — DIPHENHYDRAMINE HYDROCHLORIDE 50 MG/ML
12.5 INJECTION INTRAMUSCULAR; INTRAVENOUS ONCE
Status: COMPLETED | OUTPATIENT
Start: 2023-10-15 | End: 2023-10-15

## 2023-10-15 RX ORDER — DEXTROSE MONOHYDRATE 100 MG/ML
25 INJECTION, SOLUTION INTRAVENOUS CONTINUOUS
Status: DISCONTINUED | OUTPATIENT
Start: 2023-10-15 | End: 2023-10-17

## 2023-10-15 RX ORDER — METOCLOPRAMIDE HYDROCHLORIDE 5 MG/ML
10 INJECTION INTRAMUSCULAR; INTRAVENOUS EVERY 6 HOURS PRN
Status: DISCONTINUED | OUTPATIENT
Start: 2023-10-15 | End: 2023-10-26 | Stop reason: HOSPADM

## 2023-10-15 RX ORDER — ONDANSETRON 4 MG/1
8 TABLET, FILM COATED ORAL EVERY 6 HOURS PRN
Status: DISCONTINUED | OUTPATIENT
Start: 2023-10-15 | End: 2023-10-26 | Stop reason: HOSPADM

## 2023-10-15 RX ORDER — ALBUMIN (HUMAN) 12.5 G/50ML
12.5 SOLUTION INTRAVENOUS EVERY 4 HOURS PRN
Status: COMPLETED | OUTPATIENT
Start: 2023-10-15 | End: 2023-10-24

## 2023-10-15 RX ORDER — FENTANYL CITRATE 50 UG/ML
50 INJECTION, SOLUTION INTRAMUSCULAR; INTRAVENOUS
Status: DISCONTINUED | OUTPATIENT
Start: 2023-10-15 | End: 2023-10-20

## 2023-10-15 RX ORDER — ONDANSETRON 2 MG/ML
8 INJECTION INTRAMUSCULAR; INTRAVENOUS EVERY 6 HOURS PRN
Status: DISCONTINUED | OUTPATIENT
Start: 2023-10-15 | End: 2023-10-26 | Stop reason: HOSPADM

## 2023-10-15 RX ORDER — PROCHLORPERAZINE EDISYLATE 5 MG/ML
10 INJECTION INTRAMUSCULAR; INTRAVENOUS EVERY 6 HOURS PRN
Status: DISCONTINUED | OUTPATIENT
Start: 2023-10-15 | End: 2023-10-15

## 2023-10-15 RX ADMIN — PROCHLORPERAZINE EDISYLATE 10 MG: 5 INJECTION INTRAMUSCULAR; INTRAVENOUS at 12:07

## 2023-10-15 RX ADMIN — MUPIROCIN 1 APPLICATION: 20 OINTMENT TOPICAL at 08:59

## 2023-10-15 RX ADMIN — ONDANSETRON 4 MG: 2 INJECTION INTRAMUSCULAR; INTRAVENOUS at 09:26

## 2023-10-15 RX ADMIN — PROCHLORPERAZINE EDISYLATE 10 MG: 5 INJECTION INTRAMUSCULAR; INTRAVENOUS at 05:07

## 2023-10-15 RX ADMIN — DIPHENHYDRAMINE HYDROCHLORIDE 12.5 MG: 50 INJECTION, SOLUTION INTRAMUSCULAR; INTRAVENOUS at 05:07

## 2023-10-15 RX ADMIN — ONDANSETRON 8 MG: 2 INJECTION INTRAMUSCULAR; INTRAVENOUS at 18:12

## 2023-10-15 RX ADMIN — METOCLOPRAMIDE 10 MG: 5 INJECTION, SOLUTION INTRAMUSCULAR; INTRAVENOUS at 15:14

## 2023-10-15 RX ADMIN — LEVOTHYROXINE SODIUM 50 MCG: 50 TABLET ORAL at 08:58

## 2023-10-15 RX ADMIN — ALBUMIN (HUMAN) 12.5 G: 12.5 SOLUTION INTRAVENOUS at 12:55

## 2023-10-15 RX ADMIN — Medication 10 ML: at 21:56

## 2023-10-15 RX ADMIN — MIDODRINE HYDROCHLORIDE 10 MG: 5 TABLET ORAL at 18:12

## 2023-10-15 RX ADMIN — ONDANSETRON 4 MG: 2 INJECTION INTRAMUSCULAR; INTRAVENOUS at 02:46

## 2023-10-15 RX ADMIN — SERTRALINE 150 MG: 100 TABLET, FILM COATED ORAL at 08:59

## 2023-10-15 RX ADMIN — DEXTROSE MONOHYDRATE 25 G: 25 INJECTION, SOLUTION INTRAVENOUS at 15:39

## 2023-10-15 RX ADMIN — DEXTROSE MONOHYDRATE 25 G: 25 INJECTION, SOLUTION INTRAVENOUS at 11:40

## 2023-10-15 RX ADMIN — Medication 1 TABLET: at 08:58

## 2023-10-15 RX ADMIN — DEXTROSE MONOHYDRATE 25 G: 25 INJECTION, SOLUTION INTRAVENOUS at 02:32

## 2023-10-15 RX ADMIN — DEXTROSE MONOHYDRATE 25 ML/HR: 100 INJECTION, SOLUTION INTRAVENOUS at 16:29

## 2023-10-15 RX ADMIN — MIDODRINE HYDROCHLORIDE 10 MG: 5 TABLET ORAL at 08:58

## 2023-10-15 RX ADMIN — SENNOSIDES AND DOCUSATE SODIUM 2 TABLET: 50; 8.6 TABLET ORAL at 08:59

## 2023-10-15 RX ADMIN — DEXTROSE MONOHYDRATE 25 G: 25 INJECTION, SOLUTION INTRAVENOUS at 08:54

## 2023-10-15 RX ADMIN — CEFTRIAXONE 2000 MG: 2 INJECTION, POWDER, FOR SOLUTION INTRAMUSCULAR; INTRAVENOUS at 22:34

## 2023-10-15 RX ADMIN — Medication 10 ML: at 09:00

## 2023-10-15 RX ADMIN — MIDODRINE HYDROCHLORIDE 10 MG: 5 TABLET ORAL at 11:40

## 2023-10-15 RX ADMIN — FOLIC ACID 1 MG: 1 TABLET ORAL at 08:58

## 2023-10-15 NOTE — PROGRESS NOTES
: ***    Vital Signs  Vitals:    10/15/23 1030   BP: (!) 88/51   Pulse: 69   Resp:    Temp:    SpO2: 97%     I/O this shift:  In: 240 [P.O.:240]  Out: 600 [Emesis/NG output:600]  I/O last 3 completed shifts:  In: 855.7 [P.O.:200; I.V.:249.9; Blood:405.9]  Out: 550 [Emesis/NG output:550]      Physical Exam:    General: ***  HEENT:  Normocephalic, Atraumatic, EOMI, PERRLA, NO icterus,  Neck:  Supple, No JVD, No Carotid artery bruit, No lymphadenopathy  Chest: Clear to auscultation bilaterally, {yes no:585920} rhonchi and {yes no:767528} wheezes  Cardiovascular: Regular rate and rhythm. Positive S1 & S2, No rub, murmur or gallop.  Abdominal: Soft, nontender, no palpable organomegaly, no abdominal bruit, positive bowel sounds  Musculoskeletal: No joint tenderness or swelling, good range of motion, Adequate muscle strength on both sides, no cyanosis, clubbing or edema on lower extremities.  Neuro: Alert oriented x3, CN1 - 12 intact, No focal sensory or motor deficit.      Review of Systems:   CNS: Denied headaches, blurred vision, tingling or numbness in any part of body. No weakness or any impaired speech.  CVS: No chest pain or shortness of breath, No orthopnea or PND or exertional dyspnea,  Pulmonary: Denies shortness of breath, coughs, hematemesis, night sweats or weight loss.  GI: Denies diarrhea, nausea, vomiting. Denies weight loss, hematemesis, melena.  : Denies dysuria, frequency or hesitancy of urination  Vascular: Denies claudication, resting pain, tingling numbness or weakness in any part of the body.  Musculoskeletal: Denies Joint tenderness or pain, denies stiffness in joints, denies fever or weight loss, or skin rashes.    Current Labs  [unfilled]  Lab Results (last 24 hours)       Procedure Component Value Units Date/Time    POC Glucose Once [[id number]]  (Normal) Collected: 10/15/23 1205    Specimen: Blood Updated: 10/15/23 1210     Glucose 98 mg/dL     POC Glucose Once [[id number]]  (Abnormal)  Collected: 10/15/23 1130    Specimen: Blood Updated: 10/15/23 1132     Glucose 60 mg/dL     CBC & Differential [[id number]]  (Abnormal) Collected: 10/15/23 1043    Specimen: Blood Updated: 10/15/23 1116    Narrative:      The following orders were created for panel order CBC & Differential.  Procedure                               Abnormality         Status                     ---------                               -----------         ------                     CBC Auto Differential[[id number]]        Abnormal            Final result                 Please view results for these tests on the individual orders.    CBC Auto Differential [[id number]]  (Abnormal) Collected: 10/15/23 1043    Specimen: Blood Updated: 10/15/23 1116     WBC 4.79 10*3/mm3      RBC 2.65 10*6/mm3      Hemoglobin 8.2 g/dL      Hematocrit 25.7 %      MCV 97.0 fL      MCH 30.9 pg      MCHC 31.9 g/dL      RDW 16.2 %      RDW-SD 57.2 fl      MPV 10.8 fL      Platelets 92 10*3/mm3      Neutrophil % 81.0 %      Lymphocyte % 9.2 %      Monocyte % 7.5 %      Eosinophil % 0.4 %      Basophil % 0.4 %      Immature Grans % 1.5 %      Neutrophils, Absolute 3.88 10*3/mm3      Lymphocytes, Absolute 0.44 10*3/mm3      Monocytes, Absolute 0.36 10*3/mm3      Eosinophils, Absolute 0.02 10*3/mm3      Basophils, Absolute 0.02 10*3/mm3      Immature Grans, Absolute 0.07 10*3/mm3      nRBC 0.0 /100 WBC     Urine Culture - Urine, Straight Cath [[id number]]  (Abnormal)  (Susceptibility) Collected: 10/13/23 1210    Specimen: Urine from Straight Cath Updated: 10/15/23 0947     Urine Culture >100,000 CFU/mL Escherichia coli    Narrative:      Colonization of the urinary tract without infection is common. Treatment is discouraged unless the patient is symptomatic, pregnant, or undergoing an invasive urologic procedure.    Susceptibility        Escherichia coli      RACHAEL      Ampicillin Susceptible      Ampicillin + Sulbactam Susceptible      Cefazolin Susceptible       Cefepime Susceptible      Ceftazidime Susceptible      Ceftriaxone Susceptible      Gentamicin Susceptible      Levofloxacin Intermediate      Nitrofurantoin Susceptible      Piperacillin + Tazobactam Susceptible      Trimethoprim + Sulfamethoxazole Susceptible                           POC Glucose Once [[id number]]  (Normal) Collected: 10/15/23 0922    Specimen: Blood Updated: 10/15/23 0923     Glucose 109 mg/dL     POC Glucose Once [[id number]]  (Abnormal) Collected: 10/15/23 0846    Specimen: Blood Updated: 10/15/23 0848     Glucose 48 mg/dL     POC Glucose Once [[id number]]  (Abnormal) Collected: 10/15/23 0844    Specimen: Blood Updated: 10/15/23 0848     Glucose 46 mg/dL     Ferritin [[id number]]  (Abnormal) Collected: 10/15/23 0412    Specimen: Blood Updated: 10/15/23 0459     Ferritin 943.00 ng/mL     Narrative:      Results may be falsely decreased if patient taking Biotin.      Iron Profile [[id number]]  (Abnormal) Collected: 10/15/23 0412    Specimen: Blood Updated: 10/15/23 0456     Iron 46 mcg/dL      Iron Saturation (TSAT) 46 %      Transferrin 67 mg/dL      TIBC 100 mcg/dL     Basic Metabolic Panel [[id number]]  (Abnormal) Collected: 10/15/23 0412    Specimen: Blood Updated: 10/15/23 0456     Glucose 76 mg/dL      BUN 31 mg/dL      Creatinine 6.60 mg/dL      Sodium 139 mmol/L      Potassium 3.0 mmol/L      Comment: Slight hemolysis detected by analyzer. Results may be affected.        Chloride 99 mmol/L      CO2 27.0 mmol/L      Calcium 8.3 mg/dL      BUN/Creatinine Ratio 4.7     Anion Gap 13.0 mmol/L      eGFR 6.5 mL/min/1.73      Comment: <15 Indicative of kidney failure       Narrative:      GFR Normal >60  Chronic Kidney Disease <60  Kidney Failure <15      POC Glucose Once [[id number]]  (Normal) Collected: 10/15/23 0253    Specimen: Blood Updated: 10/15/23 0255     Glucose 121 mg/dL     POC Glucose Once [[id number]]  (Abnormal) Collected: 10/15/23 0223    Specimen: Blood Updated: 10/15/23 0224     Glucose  57 mg/dL     Blood Culture - Blood, Arm, Right [[id number]]  (Normal) Collected: 10/14/23 0143    Specimen: Blood from Arm, Right Updated: 10/15/23 0201     Blood Culture No growth at 24 hours    Blood Culture - Blood, Arm, Right [[id number]]  (Normal) Collected: 10/13/23 2251    Specimen: Blood from Arm, Right Updated: 10/14/23 2330     Blood Culture No growth at 24 hours    POC Glucose Once [[id number]]  (Normal) Collected: 10/14/23 2217    Specimen: Blood Updated: 10/14/23 2218     Glucose 71 mg/dL     POC Glucose Once [[id number]]  (Normal) Collected: 10/14/23 2017    Specimen: Blood Updated: 10/14/23 2018     Glucose 71 mg/dL     POC Glucose Once [[id number]]  (Normal) Collected: 10/14/23 1524    Specimen: Blood Updated: 10/14/23 1525     Glucose 79 mg/dL           Current Radiology  Imaging Results (Last 24 Hours)       ** No results found for the last 24 hours. **          Current Meds    Current Facility-Administered Medications:     Abemaciclib tablet 100 mg, 100 mg, Oral, BID, Moose Pete MD, 100 mg at 10/15/23 0857    acetaminophen (TYLENOL) tablet 650 mg, 650 mg, Oral, Q4H PRN **OR** acetaminophen (TYLENOL) 160 MG/5ML oral solution 650 mg, 650 mg, Oral, Q4H PRN **OR** acetaminophen (TYLENOL) suppository 650 mg, 650 mg, Rectal, Q4H PRN, Moose Pete MD    acetaminophen (TYLENOL) tablet 650 mg, 650 mg, Oral, Q4H PRN **OR** acetaminophen (TYLENOL) suppository 650 mg, 650 mg, Rectal, Q4H PRN, Terra Arreaga, BETY    acetaminophen (TYLENOL) tablet 1,000 mg, 1,000 mg, Oral, PRN, Moose Pete MD    albumin human 25 % IV SOLN 12.5 g, 12.5 g, Intravenous, Q4H PRN, Patrick Rowley MD    atorvastatin (LIPITOR) tablet 40 mg, 40 mg, Oral, Nightly, Moose Pete MD, 40 mg at 10/14/23 4646    sennosides-docusate (PERICOLACE) 8.6-50 MG per tablet 2 tablet, 2 tablet, Oral, BID, 2 tablet at 10/15/23 0868 **AND** polyethylene glycol (MIRALAX) packet 17 g, 17 g, Oral, Daily PRN **AND** bisacodyl (DULCOLAX) EC tablet 5 mg, 5  mg, Oral, Daily PRN **AND** bisacodyl (DULCOLAX) suppository 10 mg, 10 mg, Rectal, Daily PRN, Moose Pete MD    cefTRIAXone (ROCEPHIN) 2,000 mg in sodium chloride 0.9 % 100 mL IVPB-VTB, 2,000 mg, Intravenous, Q24H, Moose Pete MD, Last Rate: 200 mL/hr at 10/14/23 2236, 2,000 mg at 10/14/23 2236    dextrose (D50W) (25 g/50 mL) IV injection 25 g, 25 g, Intravenous, Q15 Min PRN, Moose Pete MD, 25 g at 10/15/23 1140    dextrose (GLUTOSE) oral gel 15 g, 15 g, Oral, Q15 Min PRN, Moose Pete MD    diphenoxylate-atropine (LOMOTIL) 2.5-0.025 MG per tablet 1 tablet, 1 tablet, Oral, 4x Daily PRN, Moose Pete MD    [START ON 10/16/2023] Epoetin Dread-epbx (RETACRIT) injection 20,000 Units, 20,000 Units, Subcutaneous, Once per day on Mon Wed Fri, Patrick Rowley MD    fentaNYL citrate (PF) (SUBLIMAZE) injection 50 mcg, 50 mcg, Intravenous, Q1H PRN, Cortes Olvera MD    folic acid (FOLVITE) tablet 1 mg, 1 mg, Oral, Daily, Moose Pete MD, 1 mg at 10/15/23 0858    gabapentin (NEURONTIN) capsule 300 mg, 300 mg, Oral, Nightly, Moose Pete MD, 300 mg at 10/14/23 2237    glucagon (GLUCAGEN) injection 1 mg, 1 mg, Intramuscular, Q15 Min PRN, Moose Pete MD    HYDROcodone-acetaminophen (NORCO) 5-325 MG per tablet 1 tablet, 1 tablet, Oral, Q4H PRN, Moose Pete MD    Influenza Vac High-Dose Quad (FLUZONE HIGH DOSE) injection 0.7 mL, 0.7 mL, Intramuscular, During Hospitalization, Moose Pete MD    insulin lispro (HUMALOG/ADMELOG) injection 2-9 Units, 2-9 Units, Subcutaneous, Q4H, Cortes Olvera MD    levothyroxine (SYNTHROID, LEVOTHROID) tablet 50 mcg, 50 mcg, Oral, Q AM, Moose Pete MD, 50 mcg at 10/15/23 0858    midodrine (PROAMATINE) tablet 10 mg, 10 mg, Oral, TID AC, Dg Padilla MD, 10 mg at 10/15/23 1140    multivitamin (THERAGRAN) tablet 1 tablet, 1 tablet, Oral, Daily, Patrick Rowley MD, 1 tablet at 10/15/23 0858    mupirocin (BACTROBAN) 2 % ointment 1 application , 1 application , Topical, Daily, Moose Pete,  MD, 1 application  at 10/15/23 0859    nitroglycerin (NITROSTAT) SL tablet 0.4 mg, 0.4 mg, Sublingual, Q5 Min PRN, Terra Arreaga APRN    ondansetron (ZOFRAN) tablet 4 mg, 4 mg, Oral, Q6H PRN **OR** ondansetron (ZOFRAN) injection 4 mg, 4 mg, Intravenous, Q6H PRN, Terra Arreaga APRN, 4 mg at 10/15/23 0926    potassium chloride (K-DUR,KLOR-CON) ER tablet 20 mEq, 20 mEq, Oral, Daily, Moose Pete MD, 20 mEq at 10/14/23 0923    potassium chloride (KLOR-CON) packet 20 mEq, 20 mEq, Oral, Daily, Moose Pete MD, 20 mEq at 10/13/23 1737    prochlorperazine (COMPAZINE) injection 10 mg, 10 mg, Intravenous, Q6H PRN, Cortes Olvera MD, 10 mg at 10/15/23 1207    sertraline (ZOLOFT) tablet 150 mg, 150 mg, Oral, Daily, Moose Pete MD, 150 mg at 10/15/23 0859    [COMPLETED] Insert Peripheral IV, , , Once **AND** sodium chloride 0.9 % flush 10 mL, 10 mL, Intravenous, PRN, Moose Pete MD    sodium chloride 0.9 % flush 10 mL, 10 mL, Intravenous, Q12H, Moose Pete MD, 10 mL at 10/15/23 0900    sodium chloride 0.9 % flush 10 mL, 10 mL, Intravenous, PRN, Moose Pete MD    sodium chloride 0.9 % infusion 250 mL, 250 mL, Intravenous, PRN, Leodan Irvin Jr., MD    sodium chloride 0.9 % infusion 40 mL, 40 mL, Intravenous, PRN, Moose Pete MD    vitamin B-12 (CYANOCOBALAMIN) tablet 1,000 mcg, 1,000 mcg, Oral, Daily, Moose Pete MD, 1,000 mcg at 10/14/23 0901                Hypotension    Diabetes type 2, controlled    Acquired hypothyroidism    Primary malignant neoplasm of breast with metastasis    Anemia in stage 3 chronic kidney disease    Morbid obesity with BMI of 50.0-59.9, adult    ESRD (end stage renal disease)    Combined systolic and diastolic congestive heart failure    ANDREW (obstructive sleep apnea)    Diastolic CHF, chronic    Abnormal urinalysis    Hypokalemia      ***        Patrick Rowley MD FACP, FASN.  10/15/23  12:53 EDT

## 2023-10-15 NOTE — THERAPY EVALUATION
Patient Name: Juana Hall  : 1960    MRN: [id number]                              Today's Date: 10/15/2023       Admit Date: 10/13/2023    Visit Dx:     ICD-10-CM ICD-9-CM   1. Hypotension, unspecified hypotension type  I95.9 458.9   2. Acute UTI  N39.0 599.0   3. End-stage renal disease on hemodialysis  N18.6 585.6    Z99.2 V45.11   4. Other fracture of left foot, initial encounter for closed fracture  S92.812A 825.20   5. Anemia in stage 3 chronic kidney disease, unspecified whether stage 3a or 3b CKD  N18.30 285.21    D63.1 585.3     Patient Active Problem List   Diagnosis    Anxiety    Depression    Diabetes type 2, controlled    Hyperlipidemia    Heart murmur    Hypertension    Post-traumatic osteoarthritis of multiple joints    Psoriasis    Radiculopathy    Acquired hypothyroidism    Peripheral neuropathy    Normocytic anemia    History of right breast cancer    Omental mass    Primary malignant neoplasm of breast with metastasis    Elevated serum creatinine    Iron deficiency anemia    Anemia in stage 3 chronic kidney disease    Stage 3b chronic kidney disease    Anxiety and depression    RYAN (acute kidney injury)    Anemia, chronic disease    Diastolic CHF, chronic    Frequent UTI    Metabolic acidosis    Severe malnutrition    Hydronephrosis    Generalized weakness    COVID-19 virus detected    Hypocalcemia    Morbid obesity with BMI of 50.0-59.9, adult    ESRD (end stage renal disease)    Pancytopenia due to chemotherapy    Chronic anemia    Rectal bleeding    Bicuspid aortic valve    Complicated UTI (urinary tract infection)    Acute UTI (urinary tract infection)    Severe aortic stenosis    Combined systolic and diastolic congestive heart failure    Depression    ANDREW (obstructive sleep apnea)    Diastolic CHF, chronic    Adverse effect of chemotherapy    UTI (urinary tract infection)    Hematuria    Dysuria    Intractable vomiting    Hypotension    Abnormal urinalysis    Hypokalemia     Past  Medical History:   Diagnosis Date    Anemia     Anxiety and depression     Bicuspid aortic valve     had surgery    Breast cancer 2004    RIGHT, HAD NEELA. 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    History of sepsis 2010    KIDNEY STONE    History of transfusion     no reaction    Hyperlipidemia     Hyperthyroidism     Hypothyroidism     Kidney stone     CURRENT LEFT-DEC 2021    Lymphedema of leg     LEFT    Metastasis from breast cancer 2019    STOMACH-OMENTUM    Neuromuscular disorder     Obesity     ANDREW on CPAP     CPAP    Osteoarthrosis     Peripheral neuropathy     FEET BILAT    Radiculopathy     Rectal bleeding 08/16/2022    Thickened endometrium     Urinary incontinence     wears pads    Weakness     BILAT LEGS-WITH ANY AMT OF TIME     Past Surgical History:   Procedure Laterality Date    AORTIC VALVULOPLASTY  01/2023    ARTERIOVENOUS FISTULA/SHUNT SURGERY Left 11/03/2021    Procedure: LEFT  BRACHIOCEPALIC ARTERIOVENOUS FISTULA;  Surgeon: Dejuan Adler MD;  Location: [facility] MAIN OR;  Service: Vascular;  Laterality: Left;    BREAST RECONSTRUCTION  2004    WITH IMPLANTS, AND REVISION, NOW REMOVED    BREAST SURGERY Bilateral 2004    breast implants removed and then replaced due to infection    CARDIAC CATHETERIZATION N/A 08/23/2022    Procedure: CORONARY ANGIOGRAPHY;  Surgeon: Luis Rivers MD;  Location: [facility] CATH INVASIVE LOCATION;  Service: Cardiology;  Laterality:  N/A;    CARDIAC CATHETERIZATION N/A 2022    Procedure: Right Heart Cath;  Surgeon: Luis Rivers MD;  Location: [facility] CATH INVASIVE LOCATION;  Service: Cardiology;  Laterality: N/A;    CARDIAC CATHETERIZATION N/A 01/10/2023    Procedure: Valvuloplasty;  Surgeon: Luis Rivers MD;  [facility]VASIVE LOC[facility];  Service: Cardiovascular;  Laterality: N/A;  01/10/2023    CARDIAC CATHETERIZATION N/A 01/10/2023    Procedure: Aortic root aortogram;  Surgeon: Luis Rivers MD;  [facility];  Service: Cardiovascular;  Laterality: N/A;    CATARACT EXTRACTION WITH INTRAOCULAR LENS IMPLANT Bilateral      SECTION  1987     SECTION  1987    CYSTOSCOPY W/ URETERAL STENT PLACEMENT      CYSTOSCOPY W/ URETERAL STENT PLACEMENT Left 2021    Procedure: CYSTOSCOPY KEFT RETROGRADE PYELOGRAM  LEFT  URETERAL STENT PLACEMENT;  Surgeon: Leodan Mckee Jr., MD;  Location: [facility] OR;  Service: Urology;  Laterality: Left;    CYSTOSCOPY W/ URETERAL STENT PLACEMENT Left 03/10/2022    Procedure: CYSTOSCOPY AND LEFT URETERAL STENT EXCHANGE, RETROGRADE PYELOGRAM;  Surgeon: Leodan Mckee Jr., MD;  Location: [facility] OR;  Service: Urology;  Laterality: Left;    CYSTOSCOPY W/ URETERAL STENT PLACEMENT Left 2023    Procedure: CYSTOSCOPY WITH LEFT URETERAL STENT PLACEMENT, RETROGRADE PYELOGRAM, CLOT EVACUATION, BLADDER FULGARATION;  Surgeon: Leodan Mckee Jr., MD;  Location: [facility] MAIN OR;  Service: Urology;  Laterality: Left;    D & C HYSTEROSCOPY N/A 2017    Procedure: DILATATION AND CURETTAGE, HYSTEROSCOPY ;  Surgeon: Cherie Oliveros MD;  Location: [facility] OR OSC;  Service:     D & C HYSTEROSCOPY N/A 2019    Procedure: DILATATION AND CURETTAGE HYSTEROSCOPY/POLYPECTOMY;  Surgeon: Cherie Oliveros MD;  [facility];  Service: Gynecology    D & C HYSTEROSCOPY ENDOMETRIAL ABLATION N/A  5/5/2023    Procedure: DILATATION AND CURETTAGE;  Surgeon: Ina Marcos MD;  Location: [facility] OR;  Service: Obstetrics/Gynecology;  Laterality: N/A;    ENDOSCOPY      INSERTION AND REMOVAL HEMODIALYSIS CATHETER Right 05/01/2021    INSERTION HEMODIALYSIS CATHETER Right 05/01/2021    Procedure: HEMODIALYSIS CATHETER INSERTION;  Surgeon: Clint Hernández MD;  Location: [facility] OR;  Service: Vascular;  Laterality: Right;    LYMPH NODE BIOPSY      MASTECTOMY Bilateral 2004    VENOUS ACCESS DEVICE (PORT) INSERTION AND REMOVAL        General Information       Row Name 10/15/23 1139          Physical Therapy Time and Intention    Document Type evaluation  -MG     Mode of Treatment co-treatment;physical therapy  -MG       Row Name 10/15/23 1139          General Information    Patient Profile Reviewed yes  -MG     Prior Level of Function independent:  -MG     Existing Precautions/Restrictions fall;oxygen therapy device and L/min  -MG     Barriers to Rehab medically complex  -MG       Row Name 10/15/23 1139          Living Environment    People in Home spouse  -MG       Row Name 10/15/23 1139          Home Main Entrance    Number of Stairs, Main Entrance none  -MG       Row Name 10/15/23 1139          Stairs Within Home, Primary    Number of Stairs, Within Home, Primary none  -MG       Row Name 10/15/23 1139          Cognition    Orientation Status (Cognition) oriented x 3  -MG       Row Name 10/15/23 1139          Safety Issues, Functional Mobility    Impairments Affecting Function (Mobility) endurance/activity tolerance;strength;pain  -MG     Comment, Safety Issues/Impairments (Mobility) Non-skid socks donned. PT/OT cotx due to anticipation of poor activity tolerance w/ inability to perform two separate treatments and need for two skilled hands for safe, functional mobility.  -MG               User Key  (r) = Recorded By, (t) = Taken By, (c) = Cosigned By      Initials Name Provider Type    MG Karla Bhatia,  PT Physical Therapist                   Mobility       Row Name 10/15/23 1140          Bed Mobility    Bed Mobility scooting/bridging  -MG     Scooting/Bridging [place] (Bed Mobility) maximum assist (25% patient effort);2 person assist;verbal cues  -MG     Assistive Device (Bed Mobility) draw sheet  -MG     Comment, (Bed Mobility) Pt scooted towards HOB and towards center of bed. With increased movement pt vomitting multiple times. Further mobility deferred due to pt vomitting and hypotension.  -MG               User Key  (r) = Recorded By, (t) = Taken By, (c) = Cosigned By      Initials Name Provider Type    Karla Tran, [place] Physical Therapist                   Obj/Interventions       Row Name 10/15/23 1142          Range of Motion Comprehensive    General Range of Motion bilateral lower extremity ROM WFL  -MG       Row Name 10/15/23 1142          Strength Comprehensive (MMT)    General Manual Muscle Testing (MMT) Assessment lower extremity strength deficits identified  -MG     Comment, General Manual Muscle Testing (MMT) Assessment Generalized weakness. Grossly 4/5 based on AROM in supine.  -MG       Row Name 10/15/23 1142          Sensory Assessment (Somatosensory)    Sensory Assessment (Somatosensory) sensation intact  States neuropathy to hands and BLEs distal to knees.  -MG               User Key  (r) = Recorded By, (t) = Taken By, (c) = Cosigned By      Initials Name Provider Type    Karla Tran, PT Physical Therapist                   Goals/Plan       Row Name 10/15/23 1144          Bed Mobility Goal 1 (PT)    Activity/Assistive Device (Bed Mobility Goal 1, PT) bed mobility activities, all  -MG     [place] Level/Cues Needed (Bed Mobility Goal 1, PT) minimum assist (75% or more patient effort)  -MG     Time Frame (Bed Mobility Goal 1, PT) 1 week  -MG       Row Name 10/15/23 1146          Transfer Goal 1 (PT)    Activity/Assistive Device (Transfer Goal 1, PT) transfers, all  -MG      [place] Level/Cues Needed (Transfer Goal 1, PT) minimum assist (75% or more patient effort)  -MG     Time Frame (Transfer Goal 1, PT) 1 week  -MG       Row Name 10/15/23 1146          Gait Training Goal 1 (PT)    Activity/Assistive Device (Gait Training Goal 1, PT) gait (walking locomotion)  -MG     [place] Level (Gait Training Goal 1, PT) minimum assist (75% or more patient effort)  -MG     Distance (Gait Training Goal 1, PT) 30  -MG     Time Frame (Gait Training Goal 1, PT) 1 week  -MG       Row Name 10/15/23 1146          Therapy Assessment/Plan (PT)    Planned Therapy Interventions (PT) balance training;bed mobility training;gait training;home exercise program;motor coordination training;neuromuscular re-education;ROM (range of motion);strengthening;patient/family education;stretching;transfer training;postural re-education  -MG               User Key  (r) = Recorded By, (t) = Taken By, (c) = Cosigned By      Initials Name Provider Type    MG Karla Bhatia, PT Physical Therapist                   Clinical Impression       Row Name 10/15/23 1142          Pain    Pretreatment Pain Rating 0/10 - no pain  -MG     Posttreatment Pain Rating 0/10 - no pain  -MG     Pre/Posttreatment Pain Comment States no pain, but very nauseous.  -MG     Pain Intervention(s) Medication (See MAR);Repositioned;Rest  -MG       Row Name 10/15/23 1146          Plan of Care Review    Plan of Care Reviewed With patient  -MG     Outcome Evaluation Pt is a 64 y/o F who presented to ED with c/o generalized weakness and multiple falls. Pt reports she is typically independent at baseline with use of a rollator and lives with her  in a house w/ no steps. Pts latests BP was noted to be low, so her BP was taken again reading 97/53; RN/Pt okay with sitting EOB attempt and re-taking BP. Pt was scooted towards HOB and centered in bed w/ MaxA x2 via draw sheet. During mobility pt w/ multiple vomiting episodes, therefore further  mobility was deferred. Pt does appear below her baseline with generalized weakness and N/V limiting activity. Pt will benefit from skilled PT services to address the deficits listed above. Will defer DC rec until further mobility is assessed.  -MG       Row Name 10/15/23 1142          Therapy Assessment/Plan (PT)    Rehab Potential (PT) fair, will monitor progress closely  -MG     Criteria for Skilled Interventions Met (PT) yes  -MG     Therapy Frequency (PT) 5 times/wk  -MG       Row Name 10/15/23 1142          Vital Signs    Pre Systolic BP Rehab 97  Supine  -MG     Pre Treatment Diastolic BP 53  -MG     Pretreatment Heart Rate (beats/min) 75  -MG     Pretreatment Resp Rate (breaths/min) 19  -MG     Pre SpO2 (%) 98  -MG     O2 Delivery Pre Treatment room air  -MG     O2 Delivery Intra Treatment room air  -MG     O2 Delivery Post Treatment room air  -MG     Pre Patient Position Supine  -MG     Intra Patient Position Supine  -MG     Post Patient Position Supine  -MG       Row Name 10/15/23 1142          Positioning and Restraints    Pre-Treatment Position in bed  -MG     Post Treatment Position bed  -MG     In Bed notified nsg;supine;fowlers;call light within reach;encouraged to call for assist;exit alarm on;side rails up x3;with nsg;SCD pump applied  -MG               User Key  (r) = Recorded By, (t) = Taken By, (c) = Cosigned By      Initials Name Provider Type    Karla Tran, PT Physical Therapist                   Outcome Measures       Row Name 10/15/23 1147 10/15/23 0800       How much help from another person do you currently need...    Turning from your back to your side while in flat bed without using bedrails? 2  -MG 2  -AS    Moving from lying on back to sitting on the side of a flat bed without bedrails? 2  -MG 2  -AS    Moving to and from a bed to a chair (including a wheelchair)? 1  -MG 2  -AS    Standing up from a chair using your arms (e.g., wheelchair, bedside chair)? 1  -MG 1  -AS    Climbing  3-5 steps with a railing? 1  -MG 1  -AS    To walk in hospital room? 1  -MG 1  -AS    AM-PAC 6 Clicks Score (PT) 8  -MG 9  -AS    Highest level of mobility 3 --> Sat at edge of bed  -MG 3 --> Sat at edge of bed  -AS              User Key  (r) = Recorded By, (t) = Taken By, (c) = Cosigned By      Initials Name Provider Type    MG Karla Bhatia, PT Physical Therapist    AS Doreen Wilder, RN Registered Nurse                                 Physical Therapy Education       Title: PT OT SLP Therapies (Done)       Topic: Physical Therapy (Done)       Point: Mobility training (Done)       Learning Progress Summary             Patient Acceptance, E, VU by BL at 10/14/2023 0630                         Point: Home exercise program (Done)       Learning Progress Summary             Patient Acceptance, E, VU by BL at 10/14/2023 0630                         Point: Body mechanics (Done)       Learning Progress Summary             Patient Acceptance, E, VU by BL at 10/14/2023 0630                         Point: Precautions (Done)       Learning Progress Summary             Patient Acceptance, E,D, VU,NR by  at 10/15/2023 1148    Acceptance, E, VU by  at 10/14/2023 0630                                         User Key       Initials Effective Dates Name Provider Type Discipline     05/24/22 -  Karla Bhatia, PT Physical Therapist PT     10/18/22 -  Zainab Grace, RN Registered Nurse Nurse                  PT Recommendation and Plan  Planned Therapy Interventions (PT): balance training, bed mobility training, gait training, home exercise program, motor coordination training, neuromuscular re-education, ROM (range of motion), strengthening, patient/family education, stretching, transfer training, postural re-education  Plan of Care Reviewed With: patient  Outcome Evaluation: Pt is a 64 y/o F who presented to ED with c/o generalized weakness and multiple falls. Pt reports she is typically independent at baseline with  use of a rollator and lives with her  in a house w/ no steps. Pts latests BP was noted to be low, so her BP was taken again reading 97/53; RN/Pt okay with sitting EOB attempt and re-taking BP. Pt was scooted towards HOB and centered in bed w/ MaxA x2 via draw sheet. During mobility pt w/ multiple vomiting episodes, therefore further mobility was deferred. Pt does appear below her baseline with generalized weakness and N/V limiting activity. Pt will benefit from skilled PT services to address the deficits listed above. Will defer DC rec until further mobility is assessed.     Time Calculation:         PT Charges       Row Name 10/15/23 1148             Time Calculation    Start Time 0921  -MG      Stop Time 0936  -MG      Time Calculation (min) 15 min  -MG      PT Received On 10/15/23  -MG      PT - Next Appointment 10/16/23  -MG      PT Goal Re-Cert Due Date 10/22/23  -MG         Time Calculation- PT    Total Timed Code Minutes- PT 4 minute(s)  -MG                User Key  (r) = Recorded By, (t) = Taken By, (c) = Cosigned By      Initials Name Provider Type    Karla Tran, PT Physical Therapist                  Therapy Charges for Today       Code Description Service Date Service Provider Modifiers Qty    78263960730 HC PT EVAL MOD COMPLEXITY 3 10/15/2023 Karla Bhatia, PT GP 1            PT G-Codes  AM-PAC 6 Clicks Score (PT): 8  PT Discharge Summary  Anticipated Discharge Disposition (PT): other (see comments) (Will defer until further mobility assessed.)    Karla Bhatia PT  10/15/2023

## 2023-10-15 NOTE — PROGRESS NOTES
LOS: 1 day   Patient Care Team:  Kiana Noriega APRN ([place]) as PCP - General (Family Medicine)  Pasha Harkins MD as Referring Physician (Cardiology)  Leodan Irvin Jr., MD as Consulting Physician (Hematology and Oncology)  Tai Antonio MD as Consulting Physician (Nephrology)  Luis Rivers MD as Consulting Physician (Cardiology)  Leodan Mckee Jr., MD as Consulting Physician (Urology)    Chief Complaint:  Weakness, dizziness    Interval History:   Feels improved today.    Vital signs, not that much different    Objective   Vital Signs  Temp:  [97.6 °F (36.4 °C)-98.6 °F (37 °C)] 98.2 °F (36.8 °C)  Heart Rate:  [67-84] 77  BP: ()/(35-83) 100/56    Intake/Output Summary (Last 24 hours) at 10/15/2023 0930  Last data filed at 10/15/2023 0930  Gross per 24 hour   Intake 200 ml   Output 750 ml   Net -550 ml       No distress  On no supplemental oxygen  Regular rate and rhythm  Murmur unchanged  No peripheral edema      Results Review:      Results from last 7 days   Lab Units 10/15/23  0412 10/14/23  0129 10/13/23  1108   SODIUM mmol/L 139 139 136   POTASSIUM mmol/L 3.0* 2.8* 2.9*   CHLORIDE mmol/L 99 99 96*   CO2 mmol/L 27.0 27.0 26.4   BUN mg/dL 31* 24* 19   CREATININE mg/dL 6.60* 5.23* 4.84*   GLUCOSE mg/dL 76 101* 156*   CALCIUM mg/dL 8.3* 8.7 8.4*     Results from last 7 days   Lab Units 10/13/23  1259 10/13/23  1108   HSTROP T ng/L 51* 51*     Results from last 7 days   Lab Units 10/14/23  0129 10/13/23  1108   WBC 10*3/mm3 3.42 5.84   HEMOGLOBIN g/dL 7.5* 8.7*   HEMATOCRIT % 23.5* 26.2*   PLATELETS 10*3/mm3 105* 123*     Results from last 7 days   Lab Units 10/13/23  1108   INR  1.36*         Results from last 7 days   Lab Units 10/13/23  1108   MAGNESIUM mg/dL 1.7           I reviewed the patient's new clinical results.  I personally viewed and interpreted the patient's EKG/Telemetry data        Medication Review:   Abemaciclib, 100 mg, Oral, BID  atorvastatin, 40 mg,  Oral, Nightly  cefTRIAXone, 2,000 mg, Intravenous, Q24H  [START ON 10/16/2023] epoetin jana/jana-epbx, 20,000 Units, Subcutaneous, Once per day on Mon Wed Fri  folic acid, 1 mg, Oral, Daily  Fulvestrant, 1 mg, Intramuscular, Q30 Days  gabapentin, 300 mg, Oral, Nightly  insulin glargine, 50 Units, Subcutaneous, Nightly  insulin lispro, 2-9 Units, Subcutaneous, Q4H  levothyroxine, 50 mcg, Oral, Q AM  midodrine, 10 mg, Oral, TID AC  multivitamin, 1 tablet, Oral, Daily  mupirocin, 1 application , Topical, Daily  potassium chloride, 20 mEq, Oral, Daily  potassium chloride, 20 mEq, Oral, Daily  senna-docusate sodium, 2 tablet, Oral, BID  sertraline, 150 mg, Oral, Daily  sodium chloride, 10 mL, Intravenous, Q12H  vitamin B-12, 1,000 mcg, Oral, Daily             Assessment & Plan     1.  Metastatic breast cancer  2.  Ascites  3.  End-stage renal disease  4.  Severe aortic stenosis    I reviewed her echo, it appears that the valve gradients are returned to pre-valvuloplasty level.    I do not think that is contributing to much to the current situation however    I will ask the structural heart team to come by tomorrow to comment on if there are any further options for her valve.        Donovan Stover MD  10/15/23  09:30 EDT

## 2023-10-15 NOTE — PROGRESS NOTES
: The patient is still have blood pressure on low side seen on dialysis ordered albumin on dialysis and midodrine    Vital Signs  Vitals:    10/15/23 1030   BP: (!) 88/51   Pulse: 69   Resp:    Temp:    SpO2: 97%     I/O this shift:  In: 240 [P.O.:240]  Out: 600 [Emesis/NG output:600]  I/O last 3 completed shifts:  In: 855.7 [P.O.:200; I.V.:249.9; Blood:405.9]  Out: 550 [Emesis/NG output:550]      Physical Exam:    General: Mild degree of nausea  HEENT:  Normocephalic, Atraumatic, EOMI, PERRLA, NO icterus,  Neck:  Supple, No JVD, No Carotid artery bruit, No lymphadenopathy  Chest: Clear to auscultation bilaterally,   Cardiovascular: Regular rate and rhythm. Positive S1 & S2, No rub, murmur or gallop.  Abdominal: Soft, nontender, no palpable organomegaly, no abdominal bruit, positive bowel sounds  Musculoskeletal: No joint tenderness or swelling, good range of motion, Adequate muscle strength on both sides, no cyanosis, clubbing or edema on lower extremities.  Neuro: Alert oriented x3, CN1 - 12 intact, No focal sensory or motor deficit.      Review of Systems:   CNS: Denied headaches, blurred vision, tingling or numbness in any part of body. No weakness or any impaired speech.  CVS: No chest pain or shortness of breath, No orthopnea or PND or exertional dyspnea,  Pulmonary: Denies shortness of breath, coughs, hematemesis, night sweats or weight loss.  GI: Denies diarrhea, nausea, vomiting. Denies weight loss, hematemesis, melena.  : Denies dysuria, frequency or hesitancy of urination  Vascular: Denies claudication, resting pain, tingling numbness or weakness in any part of the body.  Musculoskeletal: Denies Joint tenderness or pain, denies stiffness in joints, denies fever or weight loss, or skin rashes.    Current Labs  [unfilled]  Lab Results (last 24 hours)       Procedure Component Value Units Date/Time    POC Glucose Once [[id number]]  (Abnormal) Collected: 10/15/23 1130    Specimen: Blood Updated:  10/15/23 1132     Glucose 60 mg/dL     CBC & Differential [[id number]]  (Abnormal) Collected: 10/15/23 1043    Specimen: Blood Updated: 10/15/23 1116    Narrative:      The following orders were created for panel order CBC & Differential.  Procedure                               Abnormality         Status                     ---------                               -----------         ------                     CBC Auto Differential[[id number]]        Abnormal            Final result                 Please view results for these tests on the individual orders.    CBC Auto Differential [[id number]]  (Abnormal) Collected: 10/15/23 1043    Specimen: Blood Updated: 10/15/23 1116     WBC 4.79 10*3/mm3      RBC 2.65 10*6/mm3      Hemoglobin 8.2 g/dL      Hematocrit 25.7 %      MCV 97.0 fL      MCH 30.9 pg      MCHC 31.9 g/dL      RDW 16.2 %      RDW-SD 57.2 fl      MPV 10.8 fL      Platelets 92 10*3/mm3      Neutrophil % 81.0 %      Lymphocyte % 9.2 %      Monocyte % 7.5 %      Eosinophil % 0.4 %      Basophil % 0.4 %      Immature Grans % 1.5 %      Neutrophils, Absolute 3.88 10*3/mm3      Lymphocytes, Absolute 0.44 10*3/mm3      Monocytes, Absolute 0.36 10*3/mm3      Eosinophils, Absolute 0.02 10*3/mm3      Basophils, Absolute 0.02 10*3/mm3      Immature Grans, Absolute 0.07 10*3/mm3      nRBC 0.0 /100 WBC     Urine Culture - Urine, Straight Cath [[id number]]  (Abnormal)  (Susceptibility) Collected: 10/13/23 1210    Specimen: Urine from Straight Cath Updated: 10/15/23 0947     Urine Culture >100,000 CFU/mL Escherichia coli    Narrative:      Colonization of the urinary tract without infection is common. Treatment is discouraged unless the patient is symptomatic, pregnant, or undergoing an invasive urologic procedure.    Susceptibility        Escherichia coli      RACHAEL      Ampicillin Susceptible      Ampicillin + Sulbactam Susceptible      Cefazolin Susceptible      Cefepime Susceptible      Ceftazidime Susceptible       Ceftriaxone Susceptible      Gentamicin Susceptible      Levofloxacin Intermediate      Nitrofurantoin Susceptible      Piperacillin + Tazobactam Susceptible      Trimethoprim + Sulfamethoxazole Susceptible                           POC Glucose Once [[id number]]  (Normal) Collected: 10/15/23 0922    Specimen: Blood Updated: 10/15/23 0923     Glucose 109 mg/dL     POC Glucose Once [[id number]]  (Abnormal) Collected: 10/15/23 0846    Specimen: Blood Updated: 10/15/23 0848     Glucose 48 mg/dL     POC Glucose Once [[id number]]  (Abnormal) Collected: 10/15/23 0844    Specimen: Blood Updated: 10/15/23 0848     Glucose 46 mg/dL     Ferritin [[id number]]  (Abnormal) Collected: 10/15/23 0412    Specimen: Blood Updated: 10/15/23 0459     Ferritin 943.00 ng/mL     Narrative:      Results may be falsely decreased if patient taking Biotin.      Iron Profile [[id number]]  (Abnormal) Collected: 10/15/23 0412    Specimen: Blood Updated: 10/15/23 0456     Iron 46 mcg/dL      Iron Saturation (TSAT) 46 %      Transferrin 67 mg/dL      TIBC 100 mcg/dL     Basic Metabolic Panel [[id number]]  (Abnormal) Collected: 10/15/23 0412    Specimen: Blood Updated: 10/15/23 0456     Glucose 76 mg/dL      BUN 31 mg/dL      Creatinine 6.60 mg/dL      Sodium 139 mmol/L      Potassium 3.0 mmol/L      Comment: Slight hemolysis detected by analyzer. Results may be affected.        Chloride 99 mmol/L      CO2 27.0 mmol/L      Calcium 8.3 mg/dL      BUN/Creatinine Ratio 4.7     Anion Gap 13.0 mmol/L      eGFR 6.5 mL/min/1.73      Comment: <15 Indicative of kidney failure       Narrative:      GFR Normal >60  Chronic Kidney Disease <60  Kidney Failure <15      POC Glucose Once [[id number]]  (Normal) Collected: 10/15/23 0253    Specimen: Blood Updated: 10/15/23 0255     Glucose 121 mg/dL     POC Glucose Once [[id number]]  (Abnormal) Collected: 10/15/23 0223    Specimen: Blood Updated: 10/15/23 0224     Glucose 57 mg/dL     Blood Culture - Blood, Arm, Right  [[id number]]  (Normal) Collected: 10/14/23 0143    Specimen: Blood from Arm, Right Updated: 10/15/23 0201     Blood Culture No growth at 24 hours    Blood Culture - Blood, Arm, Right [[id number]]  (Normal) Collected: 10/13/23 2251    Specimen: Blood from Arm, Right Updated: 10/14/23 2330     Blood Culture No growth at 24 hours    POC Glucose Once [[id number]]  (Normal) Collected: 10/14/23 2217    Specimen: Blood Updated: 10/14/23 2218     Glucose 71 mg/dL     POC Glucose Once [[id number]]  (Normal) Collected: 10/14/23 2017    Specimen: Blood Updated: 10/14/23 2018     Glucose 71 mg/dL     POC Glucose Once [[id number]]  (Normal) Collected: 10/14/23 1524    Specimen: Blood Updated: 10/14/23 1525     Glucose 79 mg/dL     POC Glucose Once [[id number]]  (Normal) Collected: 10/14/23 1150    Specimen: Blood Updated: 10/14/23 1152     Glucose 75 mg/dL           Current Radiology  Imaging Results (Last 24 Hours)       ** No results found for the last 24 hours. **          Current Meds    Current Facility-Administered Medications:     Abemaciclib tablet 100 mg, 100 mg, Oral, BID, Moose Pete MD, 100 mg at 10/15/23 0857    acetaminophen (TYLENOL) tablet 650 mg, 650 mg, Oral, Q4H PRN **OR** acetaminophen (TYLENOL) 160 MG/5ML oral solution 650 mg, 650 mg, Oral, Q4H PRN **OR** acetaminophen (TYLENOL) suppository 650 mg, 650 mg, Rectal, Q4H PRN, Moose Pete MD    acetaminophen (TYLENOL) tablet 650 mg, 650 mg, Oral, Q4H PRN **OR** acetaminophen (TYLENOL) suppository 650 mg, 650 mg, Rectal, Q4H PRN, Terra Arreaga APRN    acetaminophen (TYLENOL) tablet 1,000 mg, 1,000 mg, Oral, PRN, Moose Pete MD    atorvastatin (LIPITOR) tablet 40 mg, 40 mg, Oral, Nightly, Moose Pete MD, 40 mg at 10/14/23 2236    sennosides-docusate (PERICOLACE) 8.6-50 MG per tablet 2 tablet, 2 tablet, Oral, BID, 2 tablet at 10/15/23 0859 **AND** polyethylene glycol (MIRALAX) packet 17 g, 17 g, Oral, Daily PRN **AND** bisacodyl (DULCOLAX) EC tablet 5 mg, 5  mg, Oral, Daily PRN **AND** bisacodyl (DULCOLAX) suppository 10 mg, 10 mg, Rectal, Daily PRN, Moose Pete MD    cefTRIAXone (ROCEPHIN) 2,000 mg in sodium chloride 0.9 % 100 mL IVPB-VTB, 2,000 mg, Intravenous, Q24H, Moose Pete MD, Last Rate: 200 mL/hr at 10/14/23 2236, 2,000 mg at 10/14/23 2236    dextrose (D50W) (25 g/50 mL) IV injection 25 g, 25 g, Intravenous, Q15 Min PRN, Moose ePte MD, 25 g at 10/15/23 1140    dextrose (GLUTOSE) oral gel 15 g, 15 g, Oral, Q15 Min PRN, Moose Pete MD    diphenoxylate-atropine (LOMOTIL) 2.5-0.025 MG per tablet 1 tablet, 1 tablet, Oral, 4x Daily PRN, Moose Pete MD    [START ON 10/16/2023] Epoetin Dread-epbx (RETACRIT) injection 20,000 Units, 20,000 Units, Subcutaneous, Once per day on Mon Wed Fri, Patrick Rowley MD    fentaNYL citrate (PF) (SUBLIMAZE) injection 50 mcg, 50 mcg, Intravenous, Q1H PRN, Cortes Olvera MD    folic acid (FOLVITE) tablet 1 mg, 1 mg, Oral, Daily, Moose Pete MD, 1 mg at 10/15/23 0858    Fulvestrant (FASLODEX) chemo injection 1 mg, 1 mg, Intramuscular, Q30 Days, Moose Pete MD    gabapentin (NEURONTIN) capsule 300 mg, 300 mg, Oral, Nightly, Moose Pete MD, 300 mg at 10/14/23 2237    glucagon (GLUCAGEN) injection 1 mg, 1 mg, Intramuscular, Q15 Min PRN, Moose Pete MD    HYDROcodone-acetaminophen (NORCO) 5-325 MG per tablet 1 tablet, 1 tablet, Oral, Q4H PRN, Moose Pete MD    Influenza Vac High-Dose Quad (FLUZONE HIGH DOSE) injection 0.7 mL, 0.7 mL, Intramuscular, During Hospitalization, Moose Pete MD    insulin lispro (HUMALOG/ADMELOG) injection 2-9 Units, 2-9 Units, Subcutaneous, Q4H, Cortes Olvera MD    levothyroxine (SYNTHROID, LEVOTHROID) tablet 50 mcg, 50 mcg, Oral, Q AM, Moose Pete MD, 50 mcg at 10/15/23 0858    midodrine (PROAMATINE) tablet 10 mg, 10 mg, Oral, TID AC, Dg Padilla MD, 10 mg at 10/15/23 1140    multivitamin (THERAGRAN) tablet 1 tablet, 1 tablet, Oral, Daily, Patrick Rowley MD, 1 tablet at 10/15/23 0858     mupirocin (BACTROBAN) 2 % ointment 1 application , 1 application , Topical, Daily, Moose Pete MD, 1 application  at 10/15/23 0859    nitroglycerin (NITROSTAT) SL tablet 0.4 mg, 0.4 mg, Sublingual, Q5 Min PRN, Terra Arreaga, APRN    ondansetron (ZOFRAN) tablet 4 mg, 4 mg, Oral, Q6H PRN **OR** ondansetron (ZOFRAN) injection 4 mg, 4 mg, Intravenous, Q6H PRN, Terra Arreaga, APRN, 4 mg at 10/15/23 0926    potassium chloride (K-DUR,KLOR-CON) ER tablet 20 mEq, 20 mEq, Oral, Daily, Moose Pete MD, 20 mEq at 10/14/23 0923    potassium chloride (KLOR-CON) packet 20 mEq, 20 mEq, Oral, Daily, Moose Pete MD, 20 mEq at 10/13/23 1737    prochlorperazine (COMPAZINE) injection 10 mg, 10 mg, Intravenous, Q6H PRN, Cortes Olvera MD, 10 mg at 10/15/23 0507    sertraline (ZOLOFT) tablet 150 mg, 150 mg, Oral, Daily, Moose Pete MD, 150 mg at 10/15/23 0859    [COMPLETED] Insert Peripheral IV, , , Once **AND** sodium chloride 0.9 % flush 10 mL, 10 mL, Intravenous, PRN, Moose Pete MD    sodium chloride 0.9 % flush 10 mL, 10 mL, Intravenous, Q12H, Moose Pete MD, 10 mL at 10/15/23 0900    sodium chloride 0.9 % flush 10 mL, 10 mL, Intravenous, PRN, Moose Pete MD    sodium chloride 0.9 % infusion 250 mL, 250 mL, Intravenous, PRN, Leodan Irvin Jr., MD    sodium chloride 0.9 % infusion 40 mL, 40 mL, Intravenous, PRN, Moose Pete MD    vitamin B-12 (CYANOCOBALAMIN) tablet 1,000 mcg, 1,000 mcg, Oral, Daily, Moose Pete MD, 1,000 mcg at 10/14/23 0901        Assessment and plan:        Hypotension    Diabetes type 2, controlled    Acquired hypothyroidism    Primary malignant neoplasm of breast with metastasis    Anemia in stage 3 chronic kidney disease    Morbid obesity with BMI of 50.0-59.9, adult    ESRD (end stage renal disease)    Combined systolic and diastolic congestive heart failure    ANDREW (obstructive sleep apnea)    Diastolic CHF, chronic    Abnormal urinalysis    Hypokalemia  ESRD continue dialysis Tuesday  Thursday Saturday  Hypotension started on midodrine we will give IV albumin avoid pulling fluid on dialysis for 1-2 sessions  Anemia continue Epogen on dialysis  Cirrhosis of liver with large volume ascites, s/p large-volume tap  Of 11 L, most likely source of hypotension, rule out other infectious etiologies      Patrick Rowley MD FACP, FASN.  10/15/23  11:48 EDT

## 2023-10-15 NOTE — THERAPY EVALUATION
Patient Name: Juana Hall  : 1960    MRN: [id number]                              Today's Date: 10/15/2023       Admit Date: 10/13/2023    Visit Dx:     ICD-10-CM ICD-9-CM   1. Hypotension, unspecified hypotension type  I95.9 458.9   2. Acute UTI  N39.0 599.0   3. End-stage renal disease on hemodialysis  N18.6 585.6    Z99.2 V45.11   4. Other fracture of left foot, initial encounter for closed fracture  S92.812A 825.20   5. Anemia in stage 3 chronic kidney disease, unspecified whether stage 3a or 3b CKD  N18.30 285.21    D63.1 585.3     Patient Active Problem List   Diagnosis    Anxiety    Depression    Diabetes type 2, controlled    Hyperlipidemia    Heart murmur    Hypertension    Post-traumatic osteoarthritis of multiple joints    Psoriasis    Radiculopathy    Acquired hypothyroidism    Peripheral neuropathy    Normocytic anemia    History of right breast cancer    Omental mass    Primary malignant neoplasm of breast with metastasis    Elevated serum creatinine    Iron deficiency anemia    Anemia in stage 3 chronic kidney disease    Stage 3b chronic kidney disease    Anxiety and depression    RYAN (acute kidney injury)    Anemia, chronic disease    Diastolic CHF, chronic    Frequent UTI    Metabolic acidosis    Severe malnutrition    Hydronephrosis    Generalized weakness    COVID-19 virus detected    Hypocalcemia    Morbid obesity with BMI of 50.0-59.9, adult    ESRD (end stage renal disease)    Pancytopenia due to chemotherapy    Chronic anemia    Rectal bleeding    Bicuspid aortic valve    Complicated UTI (urinary tract infection)    Acute UTI (urinary tract infection)    Severe aortic stenosis    Combined systolic and diastolic congestive heart failure    Depression    ANDREW (obstructive sleep apnea)    Diastolic CHF, chronic    Adverse effect of chemotherapy    UTI (urinary tract infection)    Hematuria    Dysuria    Intractable vomiting    Hypotension    Abnormal urinalysis    Hypokalemia     Past  Medical History:   Diagnosis Date    Anemia     Anxiety and depression     Bicuspid aortic valve     had surgery    Breast cancer 2004    RIGHT, HAD NEELA. 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    History of sepsis 2010    KIDNEY STONE    History of transfusion     no reaction    Hyperlipidemia     Hyperthyroidism     Hypothyroidism     Kidney stone     CURRENT LEFT-DEC 2021    Lymphedema of leg     LEFT    Metastasis from breast cancer 2019    STOMACH-OMENTUM    Neuromuscular disorder     Obesity     ANDREW on CPAP     CPAP    Osteoarthrosis     Peripheral neuropathy     FEET BILAT    Radiculopathy     Rectal bleeding 08/16/2022    Thickened endometrium     Urinary incontinence     wears pads    Weakness     BILAT LEGS-WITH ANY AMT OF TIME     Past Surgical History:   Procedure Laterality Date    AORTIC VALVULOPLASTY  01/2023    ARTERIOVENOUS FISTULA/SHUNT SURGERY Left 11/03/2021    Procedure: LEFT  BRACHIOCEPALIC ARTERIOVENOUS FISTULA;  Surgeon: Dejuan Adler MD;  Location: [facility] MAIN OR;  Service: Vascular;  Laterality: Left;    BREAST RECONSTRUCTION  2004    WITH IMPLANTS, AND REVISION, NOW REMOVED    BREAST SURGERY Bilateral 2004    breast implants removed and then replaced due to infection    CARDIAC CATHETERIZATION N/A 08/23/2022    Procedure: CORONARY ANGIOGRAPHY;  Surgeon: Luis Rivers MD;  Location: [facility] CATH INVASIVE LOCATION;  Service: Cardiology;  Laterality:  N/A;    CARDIAC CATHETERIZATION N/A 2022    Procedure: Right Heart Cath;  Surgeon: Luis Rivers MD;  Location: [facility] CATH INVASIVE LOCATION;  Service: Cardiology;  Laterality: N/A;    CARDIAC CATHETERIZATION N/A 01/10/2023    Procedure: Valvuloplasty;  Surgeon: Luis Rivers MD;  [facility] INVASIVE LOCATION;  Service: Cardiovascular;  Laterality: N/A;  01/10/2023    CARDIAC CATHETERIZATION N/A 01/10/2023    Procedure: Aortic root aortogram;  Surgeon: Luis Rivers MD;  [facility];  Service: Cardiovascular;  Laterality: N/A;    CATARACT EXTRACTION WITH INTRAOCULAR LENS IMPLANT Bilateral      SECTION  1987     SECTION  1987    CYSTOSCOPY W/ URETERAL STENT PLACEMENT      CYSTOSCOPY W/ URETERAL STENT PLACEMENT Left 2021    Procedure: CYSTOSCOPY KEFT RETROGRADE PYELOGRAM  LEFT  URETERAL STENT PLACEMENT;  Surgeon: Leodan Mckee Jr., MD;  Location: [facility] OR;  Service: Urology;  Laterality: Left;    CYSTOSCOPY W/ URETERAL STENT PLACEMENT Left 03/10/2022    Procedure: CYSTOSCOPY AND LEFT URETERAL STENT EXCHANGE, RETROGRADE PYELOGRAM;  Surgeon: Leodan Mckee Jr., MD;  Location: [facility] OR;  Service: Urology;  Laterality: Left;    CYSTOSCOPY W/ URETERAL STENT PLACEMENT Left 2023    Procedure: CYSTOSCOPY WITH LEFT URETERAL STENT PLACEMENT, RETROGRADE PYELOGRAM, CLOT EVACUATION, BLADDER FULGARATION;  Surgeon: Leodan Mckee Jr., MD;  Location: [facility] MAIN OR;  Service: Urology;  Laterality: Left;    D & C HYSTEROSCOPY N/A 2017    Procedure: DILATATION AND CURETTAGE, HYSTEROSCOPY ;  Surgeon: Cherie Oliveros MD;  Location: [facility] OR [facility];  Service:     D & C HYSTEROSCOPY N/A 2019    Procedure: DILATATION AND CURETTAGE HYSTEROSCOPY/POLYPECTOMY;  Surgeon: Cherie Oliveros MD;  [facility];  Service: Gynecology    D & C HYSTEROSCOPY ENDOMETRIAL ABLATION N/A  5/5/2023    Procedure: DILATATION AND CURETTAGE;  Surgeon: Ina Marcos MD;  Location: [facility] OR;  Service: Obstetrics/Gynecology;  Laterality: N/A;    ENDOSCOPY      INSERTION AND REMOVAL HEMODIALYSIS CATHETER Right 05/01/2021    INSERTION HEMODIALYSIS CATHETER Right 05/01/2021    Procedure: HEMODIALYSIS CATHETER INSERTION;  Surgeon: Clint Hernández MD;  Location: [facility] OR;  Service: Vascular;  Laterality: Right;    LYMPH NODE BIOPSY      MASTECTOMY Bilateral 2004    VENOUS ACCESS DEVICE (PORT) INSERTION AND REMOVAL        General Information       Row Name 10/15/23 1144          OT Time and Intention    Document Type evaluation  -KB     Mode of Treatment co-treatment;occupational therapy  -KB       Row Name 10/15/23 1144          General Information    Patient Profile Reviewed yes  -KB     Prior Level of Function independent:  -KB     Existing Precautions/Restrictions fall;oxygen therapy device and L/min  -KB     Barriers to Rehab medically complex  -KB       Row Name 10/15/23 1144          Living Environment    People in Home spouse  -KB       Row Name 10/15/23 1144          Home Main Entrance    Number of Stairs, Main Entrance none  -KB       Row Name 10/15/23 1144          Cognition    Orientation Status (Cognition) oriented x 3  -KB       Row Name 10/15/23 1144          Safety Issues, Functional Mobility    Impairments Affecting Function (Mobility) endurance/activity tolerance;strength;pain  -KB               User Key  (r) = Recorded By, (t) = Taken By, (c) = Cosigned By      Initials Name Provider Type    KB Kely Manjarrez OT Occupational Therapist                     Mobility/ADL's       Row Name 10/15/23 1144          Bed Mobility    Bed Mobility scooting/bridging  -KB     Scooting/Bridging [place] (Bed Mobility) maximum assist (25% patient effort);2 person assist;verbal cues  -KB     Assistive Device (Bed Mobility) draw sheet  -KB     Comment, (Bed Mobility) unable to assess full bed  mobility due to multiple vomitting  -       Row Name 10/15/23 1144          Functional Mobility    Functional Mobility- Ind. Level unable to perform  -       Row Name 10/15/23 1144          Activities of Daily Living    BADL Assessment/Intervention lower body dressing  -       Row Name 10/15/23 1144          Lower Body Dressing Assessment/Training    [place] Level (Lower Body Dressing) socks;don;dependent (less than 25% patient effort)  -               User Key  (r) = Recorded By, (t) = Taken By, (c) = Cosigned By      Initials Name Provider Type    Kely Cabrales OT Occupational Therapist                   Obj/Interventions       Row Name 10/15/23 1145          Sensory Assessment (Somatosensory)    Sensory Assessment (Somatosensory) sensation intact  -       Row Name 10/15/23 1145          Vision Assessment/Intervention    Visual Impairment/Limitations WNL  -       Row Name 10/15/23 1145          Range of Motion Comprehensive    General Range of Motion bilateral upper extremity ROM WNL  -       Row Name 10/15/23 1145          Strength Comprehensive (MMT)    Comment, General Manual Muscle Testing (MMT) Assessment UE grossly 4-/5 billateraly  -       Row Name 10/15/23 1145          Motor Skills    Motor Skills functional endurance  -     Functional Endurance fair  -               User Key  (r) = Recorded By, (t) = Taken By, (c) = Cosigned By      Initials Name Provider Type    Kely Cabrales OT Occupational Therapist                   Goals/Plan       Row Name 10/15/23 1150          Bed Mobility Goal 1 (OT)    Activity/Assistive Device (Bed Mobility Goal 1, OT) bed mobility activities, all  -KB     [place] Level/Cues Needed (Bed Mobility Goal 1, OT) standby assist  -     Time Frame (Bed Mobility Goal 1, OT) 2 weeks  -     Progress/Outcomes (Bed Mobility Goal 1, OT) goal ongoing  -       Row Name 10/15/23 1150          Transfer Goal 1 (OT)    Activity/Assistive Device  (Transfer Goal 1, OT) toilet  -KB     [place] Level/Cues Needed (Transfer Goal 1, OT) standby assist  -KB     Time Frame (Transfer Goal 1, OT) 2 weeks  -KB     Progress/Outcome (Transfer Goal 1, OT) goal ongoing  -KB       Row Name 10/15/23 1150          Dressing Goal 1 (OT)    Activity/Device (Dressing Goal 1, OT) lower body dressing  -KB     [place]/Cues Needed (Dressing Goal 1, OT) standby assist  -KB     Time Frame (Dressing Goal 1, OT) 2 weeks  -KB     Progress/Outcome (Dressing Goal 1, OT) goal ongoing  -KB       Row Name 10/15/23 1150          Toileting Goal 1 (OT)    Activity/Device (Toileting Goal 1, OT) adjust/manage clothing;perform perineal hygiene  -KB     [place] Level/Cues Needed (Toileting Goal 1, OT) standby assist  -KB     Time Frame (Toileting Goal 1, OT) 2 weeks  -KB     Progress/Outcome (Toileting Goal 1, OT) goal ongoing  -KB       Row Name 10/15/23 1150          Strength Goal 1 (OT)    Strength Goal 1 (OT) 4/5 B UEs  -KB       Row Name 10/15/23 1150          Therapy Assessment/Plan (OT)    Planned Therapy Interventions (OT) activity tolerance training;transfer/mobility retraining;strengthening exercise;patient/caregiver education/training  -KB               User Key  (r) = Recorded By, (t) = Taken By, (c) = Cosigned By      Initials Name Provider Type    Kely Cabrales, ABDI Occupational Therapist                   Clinical Impression       Row Name 10/15/23 1146          Pain Assessment    Pretreatment Pain Rating 0/10 - no pain  -KB     Posttreatment Pain Rating 0/10 - no pain  -KB       Row Name 10/15/23 1146          Plan of Care Review    Plan of Care Reviewed With patient  -KB     Outcome Evaluation Pt seen for OT eval this date, cotx with PT due to anticipate high level of assist needed. Pt admitted with nausea/vomitting. Has history of metastatic breast cancer, ESRD. Pt does home dialysis. Lives with , was ind prior with adls and fm with rollator. Pt was  agreeable and motivated to participate, however had multiple vomitting and was unable to fully participate, requested to end after multiple attempts. Pt does demo good UE rom but generalized weakness in UEs. Required max assist x2 to scoot up in bed. Pt demosntrating below baseline perfomance of adls and fm, will benefit from cont acute OT. Will cont to assess dc needs as she is able to be further evaluated, may need cont therapy.  -KB       Row Name 10/15/23 1146          Therapy Assessment/Plan (OT)    Rehab Potential (OT) good, to achieve stated therapy goals  -KB     Criteria for Skilled Therapeutic Interventions Met (OT) yes;meets criteria;skilled treatment is necessary  -KB     Therapy Frequency (OT) 5 times/wk  -KB       Row Name 10/15/23 1146          Therapy Plan Review/Discharge Plan (OT)    Anticipated Discharge Disposition (OT) home with assist;home with home health;skilled nursing facility  -KB       Row Name 10/15/23 1146          Positioning and Restraints    Pre-Treatment Position in bed  -KB     Post Treatment Position bed  -KB     In Bed notified nsg;supine;call light within reach;encouraged to call for assist  -KB               User Key  (r) = Recorded By, (t) = Taken By, (c) = Cosigned By      Initials Name Provider Type    KB Kely Manjarrez, OT Occupational Therapist                   Outcome Measures       Row Name 10/15/23 1151          How much help from another is currently needed...    Putting on and taking off regular lower body clothing? 1  -KB     Bathing (including washing, rinsing, and drying) 1  -KB     Toileting (which includes using toilet bed pan or urinal) 1  -KB     Putting on and taking off regular upper body clothing 2  -KB     Taking care of personal grooming (such as brushing teeth) 3  -KB     Eating meals 3  -KB     AM-PAC 6 Clicks Score (OT) 11  -KB       Row Name 10/15/23 1147 10/15/23 0800       How much help from another person do you currently need...    Turning from  your back to your side while in flat bed without using bedrails? 2  -MG 2  -AS    Moving from lying on back to sitting on the side of a flat bed without bedrails? 2  -MG 2  -AS    Moving to and from a bed to a chair (including a wheelchair)? 1  -MG 2  -AS    Standing up from a chair using your arms (e.g., wheelchair, bedside chair)? 1  -MG 1  -AS    Climbing 3-5 steps with a railing? 1  -MG 1  -AS    To walk in hospital room? 1  -MG 1  -AS    AM-PAC 6 Clicks Score (PT) 8  -MG 9  -AS    Highest level of mobility 3 --> Sat at edge of bed  -MG 3 --> Sat at edge of bed  -AS      Row Name 10/15/23 1151          Functional Assessment    Outcome Measure Options AM-PAC 6 Clicks Daily Activity (OT)  -MEE               User Key  (r) = Recorded By, (t) = Taken By, (c) = Cosigned By      Initials Name Provider Type     Karla Bhatia, PT Physical Therapist    Kely Cabrales, OT Occupational Therapist    AS Doreen Wilder, RN Registered Nurse                    Occupational Therapy Education       Title: PT OT SLP Therapies (Done)       Topic: Occupational Therapy (Done)       Point: Home exercise program (Done)       Description:   Instruct learner(s) on appropriate technique for monitoring, assisting and/or progressing therapeutic exercises/activities.                  Learning Progress Summary             Patient DEMAR Villatoro, VU by MEE at 10/15/2023 1152    Comment: ed on HEP to do in bed                                         User Key       Initials Effective Dates Name Provider Type Discipline    MEE 01/03/23 -  Kely Manjarrez, OT Occupational Therapist OT                  OT Recommendation and Plan  Planned Therapy Interventions (OT): activity tolerance training, transfer/mobility retraining, strengthening exercise, patient/caregiver education/training  Therapy Frequency (OT): 5 times/wk  Plan of Care Review  Plan of Care Reviewed With: patient  Outcome Evaluation: Pt seen for OT eval this date, cotx with PT due to  anticipate high level of assist needed. Pt admitted with nausea/vomitting. Has history of metastatic breast cancer, ESRD. Pt does home dialysis. Lives with , was ind prior with adls and fm with rollator. Pt was agreeable and motivated to participate, however had multiple vomitting and was unable to fully participate, requested to end after multiple attempts. Pt does demo good UE rom but generalized weakness in UEs. Required max assist x2 to scoot up in bed. Pt demosntrating below baseline perfomance of adls and fm, will benefit from cont acute OT. Will cont to assess dc needs as she is able to be further evaluated, may need cont therapy.     Time Calculation:   Evaluation Complexity (OT)  Review Occupational Profile/Medical/Therapy History Complexity: expanded/moderate complexity  Assessment, Occupational Performance/Identification of Deficit Complexity: 3-5 performance deficits  Clinical Decision Making Complexity (OT): detailed assessment/moderate complexity  Overall Complexity of Evaluation (OT): moderate complexity     Time Calculation- OT       Row Name 10/15/23 1153             Time Calculation- OT    OT Start Time 0918  -KB      OT Stop Time 0937  -KB      OT Time Calculation (min) 19 min  -KB      Total Timed Code Minutes- OT 14 minute(s)  -KB      OT Received On 10/15/23  -KB      OT - Next Appointment 10/16/23  -KB      OT Goal Re-Cert Due Date 10/29/23  -KB         Timed Charges    36420 - OT Therapeutic Exercise Minutes 14  -KB         Untimed Charges    OT Eval/Re-eval Minutes 5  -KB         Total Minutes    Timed Charges Total Minutes 14  -KB      Untimed Charges Total Minutes 5  -KB       Total Minutes 19  -KB                User Key  (r) = Recorded By, (t) = Taken By, (c) = Cosigned By      Initials Name Provider Type    Kely Cabrales OT Occupational Therapist                  Therapy Charges for Today       Code Description Service Date Service Provider Modifiers Qty    39417978582 HC OT  THER PROC EA 15 MIN 10/15/2023 Kely Manjarrez OT GO 1    02737827203 HC OT EVAL MOD COMPLEXITY 2 10/15/2023 Kely Manjrarez OT GO 1                 Kely Manjarrez OT  10/15/2023

## 2023-10-15 NOTE — PLAN OF CARE
Goal Outcome Evaluation:  Plan of Care Reviewed With: patient           Outcome Evaluation: Pt seen for OT eval this date, cotx with PT due to anticipate high level of assist needed. Pt admitted with nausea/vomitting. Has history of metastatic breast cancer, ESRD. Pt does home dialysis. Lives with , was ind prior with adls and fm with rollator. Pt was agreeable and motivated to participate, however had multiple vomitting and was unable to fully participate, requested to end after multiple attempts. Pt does demo good UE rom but generalized weakness in UEs. Required max assist x2 to scoot up in bed. Pt demosntrating below baseline perfomance of adls and fm, will benefit from cont acute OT. Will cont to assess dc needs as she is able to be further evaluated, may need cont therapy.    OT wore appropriate PPE during session and performed hand hygiene before/after session.     Anticipated Discharge Disposition (OT): home with assist, home with home health, skilled nursing facility

## 2023-10-15 NOTE — PLAN OF CARE
Patient continuing to have nausea and vomiting during shift. PRNs given as needed. Reglan started and Compazine stopped. Patient was given medications this morning. However, emesis  shortly afterwards. Nausea and vomiting continued throughout the shift. RN updated MD--DARIAN ordered.   Due to low blood sugar, 3 amps of 50% Dextrose given. D10 at 25cc/hr was started this afternoon.   Patient had HD, however, just filtration, no fluid removal.      Problem: Adult Inpatient Plan of Care  Goal: Plan of Care Review  Outcome: Ongoing, Progressing  Goal: Patient-Specific Goal (Individualized)  Outcome: Ongoing, Progressing  Goal: Absence of Hospital-Acquired Illness or Injury  Outcome: Ongoing, Progressing  Intervention: Identify and Manage Fall Risk  Recent Flowsheet Documentation  Taken 10/15/2023 1900 by Doreen Wilder RN  Safety Promotion/Fall Prevention:   safety round/check completed   fall prevention program maintained  Taken 10/15/2023 1800 by Doreen Wilder RN  Safety Promotion/Fall Prevention:   safety round/check completed   fall prevention program maintained  Taken 10/15/2023 1700 by Doreen Wilder RN  Safety Promotion/Fall Prevention:   safety round/check completed   fall prevention program maintained  Taken 10/15/2023 1600 by Doreen Wilder RN  Safety Promotion/Fall Prevention:   safety round/check completed   fall prevention program maintained  Taken 10/15/2023 1500 by Doreen Wilder RN  Safety Promotion/Fall Prevention:   safety round/check completed   fall prevention program maintained  Taken 10/15/2023 1400 by Doreen Wilder RN  Safety Promotion/Fall Prevention:   safety round/check completed   fall prevention program maintained  Taken 10/15/2023 1300 by Doreen Wilder RN  Safety Promotion/Fall Prevention:   safety round/check completed   fall prevention program maintained  Taken 10/15/2023 1200 by Doreen Wilder RN  Safety Promotion/Fall Prevention:   safety round/check completed   fall  prevention program maintained  Taken 10/15/2023 1100 by Doreen Wilder RN  Safety Promotion/Fall Prevention:   safety round/check completed   fall prevention program maintained  Taken 10/15/2023 1000 by Doreen Wilder RN  Safety Promotion/Fall Prevention:   safety round/check completed   fall prevention program maintained  Taken 10/15/2023 0900 by Doreen Wilder RN  Safety Promotion/Fall Prevention:   safety round/check completed   fall prevention program maintained  Taken 10/15/2023 0800 by Doreen Wilder RN  Safety Promotion/Fall Prevention:   safety round/check completed   fall prevention program maintained  Taken 10/15/2023 0700 by Doreen Wilder RN  Safety Promotion/Fall Prevention:   safety round/check completed   fall prevention program maintained  Intervention: Prevent Skin Injury  Recent Flowsheet Documentation  Taken 10/15/2023 1600 by Doreen Wilder RN  Body Position:   tilted   turned  Skin Protection:   tubing/devices free from skin contact   incontinence pads utilized   adhesive use limited  Taken 10/15/2023 1200 by Doreen Wilder RN  Body Position:   supine   tilted  Skin Protection:   tubing/devices free from skin contact   adhesive use limited  Taken 10/15/2023 0800 by Doreen Wilder RN  Body Position:   supine   turned  Skin Protection:   adhesive use limited   tubing/devices free from skin contact   incontinence pads utilized  Intervention: Prevent and Manage VTE (Venous Thromboembolism) Risk  Recent Flowsheet Documentation  Taken 10/15/2023 1600 by Doreen Wilder RN  Activity Management: bedrest  Range of Motion: active ROM (range of motion) encouraged  Taken 10/15/2023 1200 by Doreen Wilder RN  Activity Management: bedrest  Range of Motion: active ROM (range of motion) encouraged  Taken 10/15/2023 0800 by Doreen Wilder RN  Activity Management: bedrest  Range of Motion: active ROM (range of motion) encouraged  Intervention: Prevent Infection  Recent Flowsheet  Documentation  Taken 10/15/2023 1900 by Doreen Wilder RN  Infection Prevention: hand hygiene promoted  Taken 10/15/2023 1800 by Doreen Wilder RN  Infection Prevention: hand hygiene promoted  Taken 10/15/2023 1700 by Doreen Wilder RN  Infection Prevention: hand hygiene promoted  Taken 10/15/2023 1600 by Doreen Wilder RN  Infection Prevention: hand hygiene promoted  Taken 10/15/2023 1500 by Doreen Wilder RN  Infection Prevention: hand hygiene promoted  Taken 10/15/2023 1400 by Doreen Wilder RN  Infection Prevention: hand hygiene promoted  Taken 10/15/2023 1300 by Doreen Wilder RN  Infection Prevention: hand hygiene promoted  Taken 10/15/2023 1200 by Doreen Wilder RN  Infection Prevention: hand hygiene promoted  Taken 10/15/2023 1100 by Doreen Wilder RN  Infection Prevention: hand hygiene promoted  Taken 10/15/2023 1000 by Doreen Wilder RN  Infection Prevention: hand hygiene promoted  Taken 10/15/2023 0900 by Doreen Wilder RN  Infection Prevention: hand hygiene promoted  Taken 10/15/2023 0800 by Doreen Wilder RN  Infection Prevention: hand hygiene promoted  Taken 10/15/2023 0700 by Doreen Wilder RN  Infection Prevention: hand hygiene promoted  Goal: Readiness for Transition of Care  Outcome: Ongoing, Progressing     Problem: Skin Injury Risk Increased  Goal: Skin Health and Integrity  Outcome: Ongoing, Progressing  Intervention: Optimize Skin Protection  Recent Flowsheet Documentation  Taken 10/15/2023 1600 by Doreen Wilder RN  Pressure Reduction Techniques: weight shift assistance provided  Head of Bed (HOB) Positioning: HOB at 60 degrees  Pressure Reduction Devices: specialty bed utilized  Skin Protection:   tubing/devices free from skin contact   incontinence pads utilized   adhesive use limited  Taken 10/15/2023 1200 by Doreen Wilder RN  Pressure Reduction Techniques:   weight shift assistance provided   heels elevated off bed  Head of Bed (HOB) Positioning: HOB at 45  degrees  Pressure Reduction Devices: specialty bed utilized  Skin Protection:   tubing/devices free from skin contact   adhesive use limited  Taken 10/15/2023 0800 by Doreen Wilder RN  Pressure Reduction Techniques: weight shift assistance provided  Head of Bed (HOB) Positioning: HOB at 45 degrees  Pressure Reduction Devices:   specialty bed utilized   pressure-redistributing mattress utilized  Skin Protection:   adhesive use limited   tubing/devices free from skin contact   incontinence pads utilized   Goal Outcome Evaluation:

## 2023-10-15 NOTE — PLAN OF CARE
Goal Outcome Evaluation:  Plan of Care Reviewed With: patient           Outcome Evaluation: Pt is a 64 y/o F who presented to ED with c/o generalized weakness and multiple falls. Pt reports she is typically independent at baseline with use of a rollator and lives with her  in a house w/ no steps. Pts latests BP was noted to be low, so her BP was taken again reading 97/53; RN/Pt okay with sitting EOB attempt and re-taking BP. Pt was scooted towards HOB and centered in bed w/ MaxA x2 via draw sheet. During mobility pt w/ multiple vomiting episodes, therefore further mobility was deferred. Pt does appear below her baseline with generalized weakness and N/V limiting activity. Pt will benefit from skilled PT services to address the deficits listed above. Will defer DC rec until further mobility is assessed.      Anticipated Discharge Disposition (PT): other (see comments) (Will defer until further mobility assessed.)

## 2023-10-15 NOTE — PROGRESS NOTES
"  Intensive Care Unit Daily Progress Note.   [facility] INTENSIVE CARE  10/15/2023    Patient Name:  Juana Hall  MRN:  [id number]  YOB: 1960  Age: 63 y.o.  Sex: female         Reason for Admission / Chief Complaint:  Generalized weakness, hypotension    Hospital Course:   63-year-old female with metastatic breast cancer, end-stage renal disease on hemodialysis, morbid obesity who was recently admitted for nausea/vomiting and found to have large volume ascites status post 11 L removed on paracentesis on October 9.  Presented to the hospital on October 13 with hypotension and progressive dizziness with multiple falls.  Found to have urinary tract infection.    Interval History:  Complains of nausea and vomiting overnight and this morning, mildly improved with Zofran  Blood pressure remains borderline  States that she feels slightly better  Denies any chest pain or palpitations  Denies any shortness of breath or cough    Physical Exam:  BP (!) 88/47   Pulse 80   Temp 98.2 °F (36.8 °C) (Axillary)   Resp 17   Ht 170.2 cm (67\")   Wt (!) 176 kg (387 lb)   LMP  (LMP Unknown)   SpO2 97%   BMI 60.61 kg/m²   Body mass index is 60.61 kg/m².    Intake/Output    Intake/Output Summary (Last 24 hours) at 10/15/2023 1001  Last data filed at 10/15/2023 0936  Gross per 24 hour   Intake 440 ml   Output 1150 ml   Net -710 ml     General: Alert, morbidly obese  HEENT: NC/AT, EOMI, MMM  Neck: Supple, trachea midline  Cardiac: RRR, no murmur, gallops, rubs  Pulmonary: Clear to auscultation bilaterally, no adventitious breath sounds, normal respiratory effort  GI: Soft, non-tender, non-distended, distant bowel sounds  Extremities: Warm, well perfused, 1+ LE edema  Skin: no visible rash  Neuro: CN II - XII grossly intact  Psychiatry: Normal mood and affect    Data Review:  Notable Labs:  Results from last 7 days   Lab Units 10/14/23  0129 10/13/23  1108   WBC 10*3/mm3 3.42 5.84   HEMOGLOBIN g/dL " 7.5* 8.7*   PLATELETS 10*3/mm3 105* 123*     Results from last 7 days   Lab Units 10/15/23  0412 10/14/23  0129 10/13/23  1108   SODIUM mmol/L 139 139 136   POTASSIUM mmol/L 3.0* 2.8* 2.9*   CHLORIDE mmol/L 99 99 96*   CO2 mmol/L 27.0 27.0 26.4   BUN mg/dL 31* 24* 19   CREATININE mg/dL 6.60* 5.23* 4.84*   GLUCOSE mg/dL 76 101* 156*   CALCIUM mg/dL 8.3* 8.7 8.4*   MAGNESIUM mg/dL  --   --  1.7   Estimated Creatinine Clearance: 14.4 mL/min (A) (by C-G formula based on SCr of 6.6 mg/dL (H)).    Results from last 7 days   Lab Units 10/15/23  0412 10/14/23  0910 10/14/23  0129 10/13/23  2138 10/13/23  1431 10/13/23  1259 10/13/23  1108   AST (SGOT) U/L  --   --  14  --   --   --  15   ALT (SGPT) U/L  --   --  13  --   --   --  14   PROCALCITONIN ng/mL  --   --   --   --   --  3.83*  --    LACTATE mmol/L  --  1.6 2.7* 2.8*   < >  --   --    FERRITIN ng/mL 943.00*  --   --   --   --   --   --    PLATELETS 10*3/mm3  --   --  105*  --   --   --  123*    < > = values in this interval not displayed.           Imaging:  Reviewed chest images personally from past 3 days    ASSESSMENT  /  PLAN:    Hypotension  Urinary tract infection  End-stage renal disease on hemodialysis 5 times weekly  Hypokalemia  Chronic diastolic and systolic heart failure  Breast cancer with radical mastectomy and chemotherapy  Hypothyroidism  Diabetes mellitus  Obstructive sleep apnea on CPAP  Multiple falls  Fractures in left foot/ankle  Morbid obesity (BMI 53.25)  Hypoglycemia  Severe aortic valve stenosis  Nausea/vomiting    -Continue midodrine 10 mg 3 times daily, blood pressure borderline.  Likely in the setting of large volume paracentesis and infection.  -Home glargine discontinued, glucose given for hypoglycemia, who monitor frequently and consider initiation of D5 drip.  -Echocardiogram demonstrates severe aortic valve stenosis which is similar to prevalvuloplasty.  Cardiology following and will have structural heart evaluation tomorrow.   Unclear as to what can be offered due to her comorbidities.  -Nausea and vomiting likely in the setting of chemotherapeutic agents for breast cancer, continue Zofran/Compazine as needed  -Continue sliding scale insulin  -Continue ceftriaxone for UTI  -Procalcitonin elevated to almost 4   -Marked elevation of BNP which is likely in the setting of renal failure  -Potassium replacement  -Use home CPAP  -PT OT evaluation  -Patient remains in critical condition with extremely guarded prognosis especially in light of hypotension, end-stage renal disease, breast cancer, severe aortic valve stenosis.    GI prophylaxis: Not indicated  DVT prophylaxis: SCDs  Medeiros catheter: No  Bowel regimen: Ordered  Diet: Cardiac    Discharge: Continue to monitor in the ICU due to critical status with hypotension requiring intervention, electrolyte disturbances in the setting of renal failure.    Critical Care Time: 38 minutes    Dg Padilla MD  [facility]  Pulmonary and Critical Care Medicine, Interventional Pulmonology    Parts of this note may be an electronic transcription/translation of spoken language to printed text using the Dragon dictation system.

## 2023-10-16 ENCOUNTER — HOSPITAL ENCOUNTER (OUTPATIENT)
Dept: ULTRASOUND IMAGING | Facility: HOSPITAL | Age: 63
Discharge: HOME | End: 2023-10-16
Payer: MEDICARE

## 2023-10-16 LAB
ANION GAP SERPL CALCULATED.3IONS-SCNC: 12.8 MMOL/L (ref 5–15)
BACTERIA SPEC AEROBE CULT: ABNORMAL
BASOPHILS # BLD AUTO: 0.02 10*3/MM3 (ref 0–0.2)
BASOPHILS NFR BLD AUTO: 0.3 % (ref 0–1.5)
BUN SERPL-MCNC: 23 MG/DL (ref 8–23)
BUN/CREAT SERPL: 4.6 (ref 7–25)
CALCIUM SPEC-SCNC: 8 MG/DL (ref 8.6–10.5)
CANCER AG15-3 SERPL-ACNC: 21.3 U/ML
CHLORIDE SERPL-SCNC: 101 MMOL/L (ref 98–107)
CO2 SERPL-SCNC: 25.2 MMOL/L (ref 22–29)
CREAT SERPL-MCNC: 4.96 MG/DL (ref 0.57–1)
DEPRECATED RDW RBC AUTO: 56.9 FL (ref 37–54)
EGFRCR SERPLBLD CKD-EPI 2021: 9.2 ML/MIN/1.73
EOSINOPHIL # BLD AUTO: 0.08 10*3/MM3 (ref 0–0.4)
EOSINOPHIL NFR BLD AUTO: 1.2 % (ref 0.3–6.2)
ERYTHROCYTE [DISTWIDTH] IN BLOOD BY AUTOMATED COUNT: 16 % (ref 12.3–15.4)
GLUCOSE BLDC GLUCOMTR-MCNC: 100 MG/DL (ref 70–130)
GLUCOSE BLDC GLUCOMTR-MCNC: 114 MG/DL (ref 70–130)
GLUCOSE BLDC GLUCOMTR-MCNC: 117 MG/DL (ref 70–130)
GLUCOSE BLDC GLUCOMTR-MCNC: 85 MG/DL (ref 70–130)
GLUCOSE BLDC GLUCOMTR-MCNC: 92 MG/DL (ref 70–130)
GLUCOSE BLDC GLUCOMTR-MCNC: 98 MG/DL (ref 70–130)
GLUCOSE SERPL-MCNC: 88 MG/DL (ref 65–99)
HCT VFR BLD AUTO: 28.5 % (ref 34–46.6)
HGB BLD-MCNC: 9.4 G/DL (ref 12–15.9)
LYMPHOCYTES # BLD AUTO: 0.78 10*3/MM3 (ref 0.7–3.1)
LYMPHOCYTES NFR BLD AUTO: 11.9 % (ref 19.6–45.3)
MCH RBC QN AUTO: 31.5 PG (ref 26.6–33)
MCHC RBC AUTO-ENTMCNC: 33 G/DL (ref 31.5–35.7)
MCV RBC AUTO: 95.6 FL (ref 79–97)
MONOCYTES # BLD AUTO: 0.86 10*3/MM3 (ref 0.1–0.9)
MONOCYTES NFR BLD AUTO: 13.1 % (ref 5–12)
NEUTROPHILS NFR BLD AUTO: 4.75 10*3/MM3 (ref 1.7–7)
NEUTROPHILS NFR BLD AUTO: 72.3 % (ref 42.7–76)
PLATELET # BLD AUTO: 108 10*3/MM3 (ref 140–450)
PMV BLD AUTO: 11 FL (ref 6–12)
POTASSIUM SERPL-SCNC: 3.3 MMOL/L (ref 3.5–5.2)
QT INTERVAL: 380 MS
QTC INTERVAL: 478 MS
RBC # BLD AUTO: 2.98 10*6/MM3 (ref 3.77–5.28)
SODIUM SERPL-SCNC: 139 MMOL/L (ref 136–145)
WBC NRBC COR # BLD: 6.57 10*3/MM3 (ref 3.4–10.8)

## 2023-10-16 PROCEDURE — 99222 1ST HOSP IP/OBS MODERATE 55: CPT | Performed by: INTERNAL MEDICINE

## 2023-10-16 PROCEDURE — 86300 IMMUNOASSAY TUMOR CA 15-3: CPT | Performed by: INTERNAL MEDICINE

## 2023-10-16 PROCEDURE — 85025 COMPLETE CBC W/AUTO DIFF WBC: CPT | Performed by: HOSPITALIST

## 2023-10-16 PROCEDURE — 82948 REAGENT STRIP/BLOOD GLUCOSE: CPT

## 2023-10-16 PROCEDURE — 25010000002 CEFTRIAXONE PER 250 MG: Performed by: INTERNAL MEDICINE

## 2023-10-16 PROCEDURE — 99232 SBSQ HOSP IP/OBS MODERATE 35: CPT | Performed by: INTERNAL MEDICINE

## 2023-10-16 PROCEDURE — 80048 BASIC METABOLIC PNL TOTAL CA: CPT | Performed by: INTERNAL MEDICINE

## 2023-10-16 PROCEDURE — 97110 THERAPEUTIC EXERCISES: CPT

## 2023-10-16 RX ADMIN — CEFTRIAXONE 2000 MG: 2 INJECTION, POWDER, FOR SOLUTION INTRAMUSCULAR; INTRAVENOUS at 22:04

## 2023-10-16 RX ADMIN — MUPIROCIN 1 APPLICATION: 20 OINTMENT TOPICAL at 11:33

## 2023-10-16 RX ADMIN — Medication 10 ML: at 09:00

## 2023-10-16 NOTE — PROGRESS NOTES
"Patient Care Team:  Kiana Noriega APRN ([place]) as PCP - General (Family Medicine)  Pasha Harkins MD as Referring Physician (Cardiology)  Leodan Irvin Jr., MD as Consulting Physician (Hematology and Oncology)  Tai Antonio MD as Consulting Physician (Nephrology)  Luis Rivers MD as Consulting Physician (Cardiology)  Leodan Mckee Jr., MD as Consulting Physician (Urology)    Chief Complaint: Severe degenerative aortic valve stenosis    Interval History:   Patient denies chest pain or significant shortness of breath currently    Objective   Vital Signs  Temp:  [97.6 °F (36.4 °C)-97.8 °F (36.6 °C)] 97.6 °F (36.4 °C)  Heart Rate:  [] 78  BP: ()/(35-87) 97/55    Intake/Output Summary (Last 24 hours) at 10/16/2023 0857  Last data filed at 10/16/2023 0650  Gross per 24 hour   Intake 658.75 ml   Output 1900 ml   Net -1241.25 ml     Flowsheet Rows      Flowsheet Row First Filed Value   Admission Height 170.2 cm (67\") Documented at 10/13/2023 1251   Admission Weight 154 kg (340 lb) Documented at 10/13/2023 1251            Temp:  [97.6 °F (36.4 °C)-97.8 °F (36.6 °C)] 97.6 °F (36.4 °C)  Heart Rate:  [] 78  BP: ()/(35-87) 97/55    Intake/Output Summary (Last 24 hours) at 10/16/2023 0857  Last data filed at 10/16/2023 0650  Gross per 24 hour   Intake 658.75 ml   Output 1900 ml   Net -1241.25 ml     Flowsheet Rows      Flowsheet Row First Filed Value   Admission Height 170.2 cm (67\") Documented at 10/13/2023 1251   Admission Weight 154 kg (340 lb) Documented at 10/13/2023 1251            General Appearance:  Chronically ill.  Obese.   Head:    Normocephalic, without obvious abnormality, atraumatic       Neck/Lymph   No adenopathy, supple, no thyromegaly, no carotid bruit, no    JVD. NG tube in place   Lungs:     Clear to auscultation bilaterally, no wheezes, rales, or     rhonchi    Cardiac:    Normal rate, regular rhythm, III/VI systolic murmur peak late, no rub, no " gallop   Chest Wall:    No abnormalities observed   GI:     Normal bowel sounds, soft, nontender, nondistended,            no rebound tenderness   Extremities:   No cyanosis, clubbing, or edema   Circulatory/Peripheral Vascular :   Pulses palpable and equal bilaterally   Integumentary:   No bleeding or rash. Normal temperature         Abemaciclib, 100 mg, Oral, BID  atorvastatin, 40 mg, Oral, Nightly  cefTRIAXone, 2,000 mg, Intravenous, Q24H  epoetin jana/jana-epbx, 20,000 Units, Subcutaneous, Once per day on Mon Wed Fri  folic acid, 1 mg, Oral, Daily  gabapentin, 300 mg, Oral, Nightly  insulin lispro, 2-9 Units, Subcutaneous, Q4H  levothyroxine, 50 mcg, Oral, Q AM  midodrine, 10 mg, Oral, TID AC  multivitamin, 1 tablet, Oral, Daily  mupirocin, 1 application , Topical, Daily  potassium chloride, 20 mEq, Oral, Daily  potassium chloride, 20 mEq, Oral, Daily  senna-docusate sodium, 2 tablet, Oral, BID  sertraline, 150 mg, Oral, Daily  sodium chloride, 10 mL, Intravenous, Q12H  vitamin B-12, 1,000 mcg, Oral, Daily        dextrose, 25 mL/hr, Last Rate: 25 mL/hr (10/15/23 1629)        Results Review:    Results from last 7 days   Lab Units 10/16/23  0427   SODIUM mmol/L 139   POTASSIUM mmol/L 3.3*   CHLORIDE mmol/L 101   CO2 mmol/L 25.2   BUN mg/dL 23   CREATININE mg/dL 4.96*   GLUCOSE mg/dL 88   CALCIUM mg/dL 8.0*     Results from last 7 days   Lab Units 10/13/23  1259 10/13/23  1108   HSTROP T ng/L 51* 51*     Results from last 7 days   Lab Units 10/16/23  0427   WBC 10*3/mm3 6.57   HEMOGLOBIN g/dL 9.4*   HEMATOCRIT % 28.5*   PLATELETS 10*3/mm3 108*     Results from last 7 days   Lab Units 10/13/23  1108   INR  1.36*         Results from last 7 days   Lab Units 10/13/23  1108   MAGNESIUM mg/dL 1.7         @LABRCNT(bnp)@  I reviewed the patient's new clinical results.  I personally viewed and interpreted the patient's EKG/Telemetry data         Left ventricular systolic function is normal. Calculated left ventricular EF  = 58.4%    Left ventricular diastolic function was normal.    Severe aortic valve stenosis is present. Aortic valve area is 0.9 cm2.    Peak velocity of the flow distal to the aortic valve is 469.1 cm/s. Aortic valve maximum pressure gradient is 88 mmHg. Aortic valve mean pressure gradient is 48 mmHg. Aortic valve dimensionless index is 0.3 .    Estimated right ventricular systolic pressure from tricuspid regurgitation is moderately elevated (45-55 mmHg). Calculated right ventricular systolic pressure from tricuspid regurgitation is 46 mmHg.    There is mild plaque in the aortic arch present.    Assessment & Plan   Hypotension/Shock  Urinary tract infection  End-stage renal disease on hemodialysis  Chronic diastolic and systolic heart failure  Metastatic breast cancer with radical mastectomy and chemotherapy  Severe aortic valve stenosis  Ascites: s/p large volume paracentesis  Anemia    -Unfortunately there is nothing we can offer from a cardiac standpoint at this point in time.  She does not seem to be doing well as of late with significant ascites, nausea, vomiting, along with recurrent admissions.  - At this point in time she is not a candidate for valve intervention.  We will continue to follow.

## 2023-10-16 NOTE — PLAN OF CARE
Goal Outcome Evaluation:  Plan of Care Reviewed With: patient           Outcome Evaluation: Pt feeling better today, nausea and vomiting improved, pt with good effort and participation and was able to sit edge of bed today, she did need max a x 2 to come to a seated position, she fatigued quickly and was only able to sit for approx 3-4 minutes, PT will cont to progress as tolerated

## 2023-10-16 NOTE — PROGRESS NOTES
Pulmonary/critical care cross coverage note    Patient continues to have issues with nausea and vomiting.  Reglan and Zofran were administered without improvement in symptoms.  Previous KUB showed gaseous distention of abdomen, NG tube ordered to low wall suction.  Immediate return of 900mL of green-sydney bile from NG tube with significant improvement in nausea/vomiting.

## 2023-10-16 NOTE — PROGRESS NOTES
[facility]     Progress Note    Patient Name: Juana Hall  : 1960  MRN: [id number]  Primary Care Physician:  Kiana Noriega), BETY  Date of admission: 10/13/2023    Subjective   Subjective     Chief Complaint: ESRD    History of Present Illness  Patient with ESRD on home hd. Admitted with hypotension after paracentesis      Review of Systems    Objective   Objective     Vitals:   Temp:  [97.7 °F (36.5 °C)-98.2 °F (36.8 °C)] 97.8 °F (36.6 °C)  Heart Rate:  [] 78  BP: ()/(35-87) 97/55  Flow (L/min):  [2] 2    Physical Exam   General Appearance:  In no acute distress.    HEENT: Normal HEENT exam.     Extremities: .  There is no deformity, effusion or dependent edema.    Neurological: Patient is alert     Result Review    Result Review:  I have personally reviewed the results from the time of this admission to 10/16/2023 07:53 EDT and agree with these findings:  []  Laboratory list / accordion  []  Microbiology  []  Radiology  []  EKG/Telemetry   []  Cardiology/Vascular   []  Pathology  []  Old records  []  Other:  Most notable findings include:       Assessment & Plan   Assessment / Plan     Brief Patient Summary:  Juana Hall is a 63 y.o. female who has ESRD on home hd    Active Hospital Problems:  Active Hospital Problems    Diagnosis     **Hypotension     Abnormal urinalysis     Hypokalemia     Diastolic CHF, chronic     ANDREW (obstructive sleep apnea)     Combined systolic and diastolic congestive heart failure     ESRD (end stage renal disease)     Morbid obesity with BMI of 50.0-59.9, adult     Anemia in stage 3 chronic kidney disease     Primary malignant neoplasm of breast with metastasis     Acquired hypothyroidism     Diabetes type 2, controlled      Plan:   Hypotension    Diabetes type 2, controlled    Acquired hypothyroidism    Primary malignant neoplasm of breast with metastasis    Anemia in stage 3 chronic kidney disease    Morbid obesity with BMI of 50.0-59.9,  adult    ESRD (end stage renal disease)    Combined systolic and diastolic congestive heart failure    ANDREW (obstructive sleep apnea)    Diastolic CHF, chronic    Abnormal urinalysis    Hypokalemia    Patient seen and examined. Awake, alert. No distress. Labs and chart reviewed. Will continue hd t/th/s    Winnie Montes MD

## 2023-10-16 NOTE — PROGRESS NOTES
"  Intensive Care Unit Daily Progress Note.   [facility] INTENSIVE CARE  10/16/2023    Patient Name:  Juana Hall  MRN:  [id number]  YOB: 1960  Age: 63 y.o.  Sex: female         Reason for Admission / Chief Complaint:  Generalized weakness, hypotension    Hospital Course:   63-year-old female with metastatic breast cancer, end-stage renal disease on hemodialysis, morbid obesity who was recently admitted for nausea/vomiting and found to have large volume ascites status post 11 L removed on paracentesis on October 9.  Presented to the hospital on October 13 with hypotension and progressive dizziness with multiple falls.  Found to have urinary tract infection.    Interval History:  Continue to complain of nausea and vomiting despite Zofran and Reglan  Had an NG tube placed with significant bilious output  States that her nausea and vomiting have resolved  Denies any chest pain or palpitations  Denies any shortness of breath or cough    Physical Exam:  BP 97/71   Pulse 80   Temp 97.6 °F (36.4 °C) (Oral)   Resp 17   Ht 170.2 cm (67\")   Wt (!) 176 kg (387 lb)   LMP  (LMP Unknown)   SpO2 98%   BMI 60.61 kg/m²   Body mass index is 60.61 kg/m².    Intake/Output    Intake/Output Summary (Last 24 hours) at 10/16/2023 1207  Last data filed at 10/16/2023 1141  Gross per 24 hour   Intake 780 ml   Output 1500 ml   Net -720 ml     General: Alert, morbidly obese  HEENT: NC/AT, EOMI, MMM, NG tube  Neck: Supple, trachea midline  Cardiac: RRR, no murmur, gallops, rubs  Pulmonary: Clear to auscultation bilaterally, no adventitious breath sounds, normal respiratory effort  GI: Soft, non-tender, non-distended, distant bowel sounds  Extremities: Warm, well perfused, 1+ LE edema  Skin: no visible rash  Neuro: CN II - XII grossly intact  Psychiatry: Normal mood and affect    Data Review:  Notable Labs:  Results from last 7 days   Lab Units 10/16/23  0427 10/15/23  1043 10/14/23  0129 10/13/23  1108 "   WBC 10*3/mm3 6.57 4.79 3.42 5.84   HEMOGLOBIN g/dL 9.4* 8.2* 7.5* 8.7*   PLATELETS 10*3/mm3 108* 92* 105* 123*     Results from last 7 days   Lab Units 10/16/23  0427 10/15/23  0412 10/14/23  0129 10/13/23  1108   SODIUM mmol/L 139 139 139 136   POTASSIUM mmol/L 3.3* 3.0* 2.8* 2.9*   CHLORIDE mmol/L 101 99 99 96*   CO2 mmol/L 25.2 27.0 27.0 26.4   BUN mg/dL 23 31* 24* 19   CREATININE mg/dL 4.96* 6.60* 5.23* 4.84*   GLUCOSE mg/dL 88 76 101* 156*   CALCIUM mg/dL 8.0* 8.3* 8.7 8.4*   MAGNESIUM mg/dL  --   --   --  1.7   Estimated Creatinine Clearance: 19.1 mL/min (A) (by C-G formula based on SCr of 4.96 mg/dL (H)).    Results from last 7 days   Lab Units 10/16/23  0427 10/15/23  1043 10/15/23  0412 10/14/23  0910 10/14/23  0129 10/13/23  2138 10/13/23  1431 10/13/23  1259 10/13/23  1108   AST (SGOT) U/L  --   --   --   --  14  --   --   --  15   ALT (SGPT) U/L  --   --   --   --  13  --   --   --  14   PROCALCITONIN ng/mL  --   --   --   --   --   --   --  3.83*  --    LACTATE mmol/L  --   --   --  1.6 2.7* 2.8*   < >  --   --    FERRITIN ng/mL  --   --  943.00*  --   --   --   --   --   --    PLATELETS 10*3/mm3 108* 92*  --   --  105*  --   --   --  123*    < > = values in this interval not displayed.           Imaging:  Reviewed chest images personally from past 3 days    ASSESSMENT  /  PLAN:    Hypotension  Urinary tract infection  End-stage renal disease on hemodialysis 5 times weekly  Hypokalemia  Chronic diastolic and systolic heart failure  Breast cancer with radical mastectomy and chemotherapy  Hypothyroidism  Diabetes mellitus  Obstructive sleep apnea on CPAP  Multiple falls  Fractures in left foot/ankle  Morbid obesity (BMI 53.25)  Hypoglycemia  Severe aortic valve stenosis  Ileus status post NG tube placement 10/16    -Nausea and vomiting resolved now after NG tube placement for suspected ileus on low intermittent suction  -Off vasopressors.  Continue midodrine 3 times daily  -Off home glargine due to  hyperglycemia, sliding scale insulin.  Also on D5 drip   -Echocardiogram demonstrates severe aortic valve stenosis which is similar to prevalvuloplasty.  Cardiology saw and unable to offer any intervention.  -Continue ceftriaxone for UTI  -Electrolyte replacement  -Use home CPAP  -PT OT evaluation  -Overall patient has a guarded prognosis in the setting of end-stage renal disease, breast cancer, heart failure, severe aortic valvular disease not amenable to intervention, morbid obesity.  We will need to consider palliative.    GI prophylaxis: Not indicated  DVT prophylaxis: SCDs  Medeiros catheter: No  Bowel regimen: Ordered  Diet: Cardiac    Discharge: Transfer to floor.  Internal medicine consulted to assume care.    Dg Padilla MD  [facility]  Pulmonary and Critical Care Medicine, Interventional Pulmonology    Parts of this note may be an electronic transcription/translation of spoken language to printed text using the Dragon dictation system.

## 2023-10-16 NOTE — PROGRESS NOTES
[facility] Medicine Services  PROGRESS NOTE    Patient Name: Juana Hall  : 1960  MRN: [id number]    Date of Admission: 10/13/2023  Primary Care Physician: Kiana Noriega ([place]), BETY    Subjective   Subjective     CC:  Follow-up multiple medical issues including hypotension    Subjective:  Patient still feels weak.  She had significant biliary draining from her NG tube.  She does not like her NG tube but is tolerating.  She says since the NG tube was placed her nausea has greatly improved.  She understands she is not thought to be a great candidate for valvular intervention.  Her  is at the bedside providing support and care.  She notes she is passing flatus.  No other new complaint reported.  Patient is glad she is transferring out of the intensive care unit.    Review of Systems  No current fevers or chills  No current headache or lightheadedness  No current chest pain or palpitations      Objective   Objective     Vital Signs:   Temp:  [97.6 °F (36.4 °C)-97.8 °F (36.6 °C)] 97.7 °F (36.5 °C)  Heart Rate:  [] 80  BP: ()/(37-87) 97/71        Physical Exam:  Constitutional:Awake, alert, chronically ill-appearing but currently nontoxic  HENT: NG tube is present, NCAT, mucous membranes moist, neck supple  Respiratory: Relatively clear, moderate inspiration, breathing currently nonlabored  Cardiovascular: Pulse rate is normal, normal radial pulses  Gastrointestinal: Severely obese soft, nontender, nondistended  Musculoskeletal: Severely chronically debilitated in appearance, positive for lower extremity edema, BMI 60  Psychiatric: Mildly anxious affect, cooperative, conversational  Neurologic: No slurred speech or facial droop, follows commands  Skin: No rashes or jaundice, warm      Results Reviewed:  Results from last 7 days   Lab Units 10/16/23  0427 10/15/23  1043 10/14/23  0129 10/13/23  1259 10/13/23  1108   WBC 10*3/mm3 6.57 4.79 3.42  --  5.84   HEMOGLOBIN g/dL 9.4* 8.2*  7.5*  --  8.7*   HEMATOCRIT % 28.5* 25.7* 23.5*  --  26.2*   PLATELETS 10*3/mm3 108* 92* 105*  --  123*   INR   --   --   --   --  1.36*   PROCALCITONIN ng/mL  --   --   --  3.83*  --      Results from last 7 days   Lab Units 10/16/23  0427 10/15/23  0412 10/14/23  0129 10/13/23  1259 10/13/23  1108   SODIUM mmol/L 139 139 139  --  136   POTASSIUM mmol/L 3.3* 3.0* 2.8*  --  2.9*   CHLORIDE mmol/L 101 99 99  --  96*   CO2 mmol/L 25.2 27.0 27.0  --  26.4   BUN mg/dL 23 31* 24*  --  19   CREATININE mg/dL 4.96* 6.60* 5.23*  --  4.84*   GLUCOSE mg/dL 88 76 101*  --  156*   CALCIUM mg/dL 8.0* 8.3* 8.7  --  8.4*   ALK PHOS U/L  --   --  121*  --  115   ALT (SGPT) U/L  --   --  13  --  14   AST (SGOT) U/L  --   --  14  --  15   HSTROP T ng/L  --   --   --  51* 51*   PROBNP pg/mL  --   --   --   --  21,914.0*     Estimated Creatinine Clearance: 19.1 mL/min (A) (by C-G formula based on SCr of 4.96 mg/dL (H)).    Microbiology Results Abnormal       Procedure Component Value - Date/Time    Blood Culture - Blood, Arm, Right [[id number]]  (Normal) Collected: 10/14/23 0143    Lab Status: Preliminary result Specimen: Blood from Arm, Right Updated: 10/16/23 0200     Blood Culture No growth at 2 days    Blood Culture - Blood, Arm, Right [[id number]]  (Normal) Collected: 10/13/23 2251    Lab Status: Preliminary result Specimen: Blood from Arm, Right Updated: 10/15/23 2330     Blood Culture No growth at 2 days    COVID PRE-OP / PRE-PROCEDURE SCREENING ORDER (NO ISOLATION) - Swab, Nasopharynx [[id number]]  (Normal) Collected: 10/13/23 1057    Lab Status: Final result Specimen: Swab from Nasopharynx Updated: 10/13/23 1300    Narrative:      The following orders were created for panel order COVID PRE-OP / PRE-PROCEDURE SCREENING ORDER (NO ISOLATION) - Swab, Nasopharynx.  Procedure                               Abnormality         Status                     ---------                               -----------         ------                      COVID-19,[facility] HAY IN-HOUSE...[[id number]]  Normal              Final result                 Please view results for these tests on the individual orders.    COVID-19,[facility] IN-HOUSE CEPHEID/JF NP SWAB IN TRANSPORT MEDIA 8-12 HR TAT - Swab, Nasopharynx [[id number]]  (Normal) Collected: 10/13/23 1056    Lab Status: Final result Specimen: Swab from Nasopharynx Updated: 10/13/23 1302     COVID19 Not Detected    Narrative:      Fact sheet for providers: https://www.fda.gov/media/113874/download     Fact sheet for patients: https://www.fda.gov/media/140260/download            Imaging Results (Last 24 Hours)       Procedure Component Value Units Date/Time    XR Abdomen KUB [[id number]] Collected: 10/15/23 2139     Updated: 10/15/23 2143    Narrative:      KUB     HISTORY: Nasogastric tube placement     COMPARISON: October 15, 2023     FINDINGS:  Nasogastric tube appears to extend into the proximal stomach. Gaseous  distention of the stomach appears improved.           This report was finalized on 10/15/2023 9:39 PM by Dr. Gricelda Sprague M.D on Workstation: [id number]       XR Abdomen KUB [[id number]] Collected: 10/15/23 2057     Updated: 10/15/23 2102    Narrative:      KUB     HISTORY: Nasogastric tube placement     COMPARISON: October 15, 2023     FINDINGS:  The patient's nasogastric tube is coiled upon itself within the  esophagus. Removal and replacement is recommended. Gaseous distention of  multiple bowel loops is noted.     FINDINGS were called to the patient's nurse at 8:58 p.m.     This report was finalized on 10/15/2023 8:59 PM by Dr. Gricelda Sprague M.D on Workstation: [id number]       XR Abdomen KUB [[id number]] Collected: 10/15/23 1550     Updated: 10/15/23 1555    Narrative:      PROCEDURE:  XR ABDOMEN KUB-     HISTORY: Nausea/vomiting.     COMPARISON: CT abdomen and pelvis 9/28/2023     FINDINGS:       4 views of the abdomen/pelvis were obtained.  Limited examination due to poor penetration related  to patient body  habitus. There is gaseous distention of the stomach. There are dilated  air-filled loops of small bowel in the left lower quadrant, measuring up  to approximate 3.7 cm. These are nonspecific and could be due to ileus  or obstruction in the appropriate clinical setting. Consider further  evaluation with CT, as clinically directed. A left nephroureteral stent  is only faintly visualized.     This report was finalized on 10/15/2023 3:52 PM by Dr. Asya Rush M.D  on Workstation: [id number]               Results for orders placed during the hospital encounter of 10/13/23    Adult Transthoracic Echo Complete W/ Cont if Necessary Per Protocol    Interpretation Summary    Left ventricular systolic function is normal. Calculated left ventricular EF = 58.4%    Left ventricular diastolic function was normal.    Severe aortic valve stenosis is present. Aortic valve area is 0.9 cm2.    Peak velocity of the flow distal to the aortic valve is 469.1 cm/s. Aortic valve maximum pressure gradient is 88 mmHg. Aortic valve mean pressure gradient is 48 mmHg. Aortic valve dimensionless index is 0.3 .    Estimated right ventricular systolic pressure from tricuspid regurgitation is moderately elevated (45-55 mmHg). Calculated right ventricular systolic pressure from tricuspid regurgitation is 46 mmHg.    There is mild plaque in the aortic arch present.      I have reviewed the medications:  Scheduled Meds:Abemaciclib, 100 mg, Oral, BID  atorvastatin, 40 mg, Oral, Nightly  cefTRIAXone, 2,000 mg, Intravenous, Q24H  epoetin jana/jana-epbx, 20,000 Units, Subcutaneous, Once per day on Mon Wed Fri  folic acid, 1 mg, Oral, Daily  gabapentin, 300 mg, Oral, Nightly  insulin lispro, 2-9 Units, Subcutaneous, Q4H  levothyroxine, 50 mcg, Oral, Q AM  midodrine, 10 mg, Oral, TID AC  multivitamin, 1 tablet, Oral, Daily  mupirocin, 1 application , Topical, Daily  potassium chloride, 20 mEq, Oral, Daily  potassium chloride, 20 mEq, Oral,  Daily  senna-docusate sodium, 2 tablet, Oral, BID  sertraline, 150 mg, Oral, Daily  sodium chloride, 10 mL, Intravenous, Q12H  vitamin B-12, 1,000 mcg, Oral, Daily      Continuous Infusions:dextrose, 25 mL/hr, Last Rate: 25 mL/hr (10/15/23 6915)      PRN Meds:.  acetaminophen **OR** acetaminophen **OR** acetaminophen    acetaminophen **OR** acetaminophen    acetaminophen    albumin human    senna-docusate sodium **AND** polyethylene glycol **AND** bisacodyl **AND** bisacodyl    dextrose    dextrose    diphenoxylate-atropine    fentaNYL citrate (PF)    glucagon (human recombinant)    HYDROcodone-acetaminophen    influenza vaccine    Menthol-Zinc Oxide    metoclopramide    nitroglycerin    ondansetron **OR** ondansetron    [COMPLETED] Insert Peripheral IV **AND** sodium chloride    sodium chloride    sodium chloride    sodium chloride    Assessment & Plan   Assessment & Plan     Active Hospital Problems    Diagnosis  POA    **Hypotension [I95.9]  Yes    Abnormal urinalysis [R82.90]  Unknown    Hypokalemia [E87.6]  Unknown    Diastolic CHF, chronic [I50.32]  Yes    ANDREW (obstructive sleep apnea) [G47.33]  Yes    Combined systolic and diastolic congestive heart failure [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        Brief Hospital Course to date:  Juana Hall is a 63 y.o. female presents the hospital with hypotension    Hypotension  Urinary tract infection  End-stage renal disease on hemodialysis 5 times weekly  Hypokalemia  Chronic diastolic and systolic heart failure  Breast cancer with radical mastectomy and  chemotherapy  Hypothyroidism  Diabetes mellitus  Obstructive sleep apnea on CPAP  Multiple falls  Fractures in left foot/ankle  Morbid obesity, very severe  Hypoglycemia  Severe aortic valve stenosis  Ileus status post NG tube placement 10/16    Discussion/plan for today:  Patient transferring out of the intensive care unit.  We will take over as primary team at this point.  Vitals, labs, and imaging reviewed.    Patient with significant ileus at this point.  She is now passing gas.  Plan to monitor progression of GI symptoms.  Continue NG tube to suction at this time.    Continue midodrine 3 times daily for blood support.  Hopefully we can wean this if blood pressure continues to improve.    Cardiology note reviewed.  Patient is not thought to be a candidate for valvular intervention at this time.  With severe aortic stenosis long-term prognosis unfortunately is thought to be poor.  Although patient is temporarily improving I feel we need to continue a goals of care discussion.  I will consult palliative care to assist with this.  Patient needs to consider her CODE STATUS and perhaps would be a good candidate for outpatient hospice if she stabilizes further.    Echocardiogram reviewed and severe valvular disease and pulmonary hypertension noted.    Plan for dialysis as per nephrology recommendations.    Recent urinalysis reviewed and subsequent pyuria noted.  Culture was positive for E. coli.  Current treatment is ceftriaxone.  Chest x-ray images reviewed and also possible small right lower lobe pneumonia    Replace electrolytes as needed.    Continue postoperative shoe for foot injury.    Morbid obesity significantly complicates all aspects of care.    DVT Prophylaxis: SCDs added      Disposition: Pending clinical course    CODE STATUS:   Code Status and Medical Interventions:   Ordered at: 10/13/23 7847     Code Status (Patient has no pulse and is not breathing):    CPR (Attempt to Resuscitate)     Medical  Interventions (Patient has pulse or is breathing):    Full Support       Zoltan San MD  10/16/23

## 2023-10-16 NOTE — PLAN OF CARE
Goal Outcome Evaluation:  Plan of Care Reviewed With: patient        Progress: no change  Outcome Evaluation: pt had increasing c/o nausea/vomiting with possible ileus shown on KUB. NG placed with low-wall suction. 900cc out overnght. No c/o nausea at this time. During insertion on NG, pt went into Aib/Flutter. Ms. Hall coverted back to NSR during shift. D10 continued at 25/hr. Pt NPO. Awaiting hem/oc consult. Will continue to monitor and await further orders.

## 2023-10-16 NOTE — NURSING NOTE
CWOCN- consult for left posterior thigh and coccyx. Reviewed chart and photos. Patient seen recently but the WOC team, prior admission. It appears that skin changes have evolved to form a small defined open wound to the left thigh. This is where her thighs/brief rub together. It is not surprising that the wound is more defined. I would recommend to try a silicone border dressing as this will decrease the friction between the skin areas. If the dressing will no stay in place, then use barrier ointment. Reduce friction as much as possible using more an absorbent pad or even the male incontinence wrap instead of a brief.   Patient also has an area of intertrigo above the anus in the gluteal cleft. The tissue is open, split. If she is continent, I would use a silicone border here as well, tucked into the cleft to decrease moisture in this area. If she has incontinence, however, will need to use the calmoseptine.   Will update the care plan.   [facility] will not plan on following the patient at this time but please reconsult if areas do not improve or new issues arise.   Recommend good hygiene between skin folds and use of pillow cases in folds as well to decrease moisture and intertrigo.

## 2023-10-16 NOTE — THERAPY TREATMENT NOTE
Patient Name: Juana Hall  : 1960    MRN: [id number]                              Today's Date: 10/16/2023       Admit Date: 10/13/2023    Visit Dx:     ICD-10-CM ICD-9-CM   1. Hypotension, unspecified hypotension type  I95.9 458.9   2. Acute UTI  N39.0 599.0   3. End-stage renal disease on hemodialysis  N18.6 585.6    Z99.2 V45.11   4. Other fracture of left foot, initial encounter for closed fracture  S92.812A 825.20   5. Anemia in stage 3 chronic kidney disease, unspecified whether stage 3a or 3b CKD  N18.30 285.21    D63.1 585.3     Patient Active Problem List   Diagnosis    Anxiety    Depression    Diabetes type 2, controlled    Hyperlipidemia    Heart murmur    Hypertension    Post-traumatic osteoarthritis of multiple joints    Psoriasis    Radiculopathy    Acquired hypothyroidism    Peripheral neuropathy    Normocytic anemia    History of right breast cancer    Omental mass    Primary malignant neoplasm of breast with metastasis    Elevated serum creatinine    Iron deficiency anemia    Anemia in stage 3 chronic kidney disease    Stage 3b chronic kidney disease    Anxiety and depression    RYAN (acute kidney injury)    Anemia, chronic disease    Diastolic CHF, chronic    Frequent UTI    Metabolic acidosis    Severe malnutrition    Hydronephrosis    Generalized weakness    COVID-19 virus detected    Hypocalcemia    Morbid obesity with BMI of 50.0-59.9, adult    ESRD (end stage renal disease)    Pancytopenia due to chemotherapy    Chronic anemia    Rectal bleeding    Bicuspid aortic valve    Complicated UTI (urinary tract infection)    Acute UTI (urinary tract infection)    Severe aortic stenosis    Combined systolic and diastolic congestive heart failure    Depression    ANDREW (obstructive sleep apnea)    Diastolic CHF, chronic    Adverse effect of chemotherapy    UTI (urinary tract infection)    Hematuria    Dysuria    Intractable vomiting    Hypotension    Abnormal urinalysis    Hypokalemia     Past  Medical History:   Diagnosis Date    Anemia     Anxiety and depression     Bicuspid aortic valve     had surgery    Breast cancer 2004    RIGHT, HAD NEELA. 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    History of sepsis 2010    KIDNEY STONE    History of transfusion     no reaction    Hyperlipidemia     Hyperthyroidism     Hypothyroidism     Kidney stone     CURRENT LEFT-DEC 2021    Lymphedema of leg     LEFT    Metastasis from breast cancer 2019    STOMACH-OMENTUM    Neuromuscular disorder     Obesity     ANDREW on CPAP     CPAP    Osteoarthrosis     Peripheral neuropathy     FEET BILAT    Radiculopathy     Rectal bleeding 08/16/2022    Thickened endometrium     Urinary incontinence     wears pads    Weakness     BILAT LEGS-WITH ANY AMT OF TIME     Past Surgical History:   Procedure Laterality Date    AORTIC VALVULOPLASTY  01/2023    ARTERIOVENOUS FISTULA/SHUNT SURGERY Left 11/03/2021    Procedure: LEFT  BRACHIOCEPALIC ARTERIOVENOUS FISTULA;  Surgeon: Dejuan Adler MD;  Location: [facility] MAIN OR;  Service: Vascular;  Laterality: Left;    BREAST RECONSTRUCTION  2004    WITH IMPLANTS, AND REVISION, NOW REMOVED    BREAST SURGERY Bilateral 2004    breast implants removed and then replaced due to infection    CARDIAC CATHETERIZATION N/A 08/23/2022    Procedure: CORONARY ANGIOGRAPHY;  Surgeon: Luis Rivers MD;  Location: [facility] CATH INVASIVE LOCATION;  Service: Cardiology;  Laterality:  N/A;    CARDIAC CATHETERIZATION N/A 2022    Procedure: Right Heart Cath;  Surgeon: Luis Rivers MD;  Location: [facility] CATH INVASIVE LOCATION;  Service: Cardiology;  Laterality: N/A;    CARDIAC CATHETERIZATION N/A 01/10/2023    Procedure: Valvuloplasty;  Surgeon: Luis Rivers MD;  Location[facility] CATH INVASIVE LOCATION;  Service: Cardiovascular;  Laterality: N/A;  01/10/2023    CARDIAC CATHETERIZATION N/A 01/10/2023    Procedure: Aortic root aortogram;  Surgeon: Luis Rivers MD;  Location: [facility] CATH [facility]VASIVE LOCATION;  Service: Cardiovascular;  Laterality: N/A;    CATARACT EXTRACTION WITH INTRAOCULAR LENS IMPLANT Bilateral      SECTION  1987     SECTION  1987    CYSTOSCOPY W/ URETERAL STENT PLACEMENT      CYSTOSCOPY W/ URETERAL STENT PLACEMENT Left 2021    Procedure: CYSTOSCOPY KEFT RETROGRADE PYELOGRAM  LEFT  URETERAL STENT PLACEMENT;  Surgeon: Leodan Mckee Jr., MD;  Location: [facility] OR;  Service: Urology;  Laterality: Left;    CYSTOSCOPY W/ URETERAL STENT PLACEMENT Left 03/10/2022    Procedure: CYSTOSCOPY AND LEFT URETERAL STENT EXCHANGE, RETROGRADE PYELOGRAM;  Surgeon: Leodan Mckee Jr., MD;  Location: [facility] OR;  Service: Urology;  Laterality: Left;    CYSTOSCOPY W/ URETERAL STENT PLACEMENT Left 2023    Procedure: CYSTOSCOPY WITH LEFT URETERAL STENT PLACEMENT, RETROGRADE PYELOGRAM, CLOT EVACUATION, BLADDER FULGARATION;  Surgeon: Leodan Mckee Jr., MD;  Location: [facility] MAIN OR;  Service: Urology;  Laterality: Left;    D & C HYSTEROSCOPY N/A 2017    Procedure: DILATATION AND CURETTAGE, HYSTEROSCOPY ;  Surgeon: Cherie Oliveros MD;  Location: [facility] OR [facility];  Service:     D & C HYSTEROSCOPY N/A 2019    Procedure: DILATATION AND CURETTAGE HYSTEROSCOPY/POLYPECTOMY;  Surgeon: Cherie Oliveros MD;  [facility];  Service: Gynecology    D & C HYSTEROSCOPY ENDOMETRIAL ABLATION N/A  5/5/2023    Procedure: DILATATION AND CURETTAGE;  Surgeon: Ina Marcos MD;  Location: [facility] MAIN OR;  Service: Obstetrics/Gynecology;  Laterality: N/A;    ENDOSCOPY      INSERTION AND REMOVAL HEMODIALYSIS CATHETER Right 05/01/2021    INSERTION HEMODIALYSIS CATHETER Right 05/01/2021    Procedure: HEMODIALYSIS CATHETER INSERTION;  Surgeon: Clint Hernández MD;  Location: [facility] MAIN OR;  Service: Vascular;  Laterality: Right;    LYMPH NODE BIOPSY      MASTECTOMY Bilateral 2004    VENOUS ACCESS DEVICE (PORT) INSERTION AND REMOVAL        General Information       Row Name 10/16/23 1623          Physical Therapy Time and Intention    Document Type therapy note (daily note)  -PC     Mode of Treatment physical therapy  -PC       Row Name 10/16/23 1623          General Information    Existing Precautions/Restrictions fall  -PC     Barriers to Rehab medically complex  -PC       Row Name 10/16/23 1623          Cognition    Orientation Status (Cognition) oriented x 4  -PC               User Key  (r) = Recorded By, (t) = Taken By, (c) = Cosigned By      Initials Name Provider Type    PC Sabi Kevin PT Physical Therapist                   Mobility       Row Name 10/16/23 1623          Bed Mobility    Scooting/Bridging Scurry (Bed Mobility) maximum assist (25% patient effort);2 person assist  -PC     Assistive Device (Bed Mobility) draw sheet;head of bed elevated  -PC     Comment, (Bed Mobility) pt able to come to sit edge of bed with max a x 2, she fatigued after 3-4 minutes and needed to return to supine  -PC               User Key  (r) = Recorded By, (t) = Taken By, (c) = Cosigned By      Initials Name Provider Type    PC Sabi Kevin, PT Physical Therapist                   Obj/Interventions       Row Name 10/16/23 1622          Balance    Comment, Balance pt able to work on sitting balance, sitting tolerance, trunk control  -PC               User Key  (r) = Recorded By, (t) = Taken By, (c) = Cosigned By       Initials Name Provider Type    PC Sabi Kevin, PT Physical Therapist                   Goals/Plan    No documentation.                  Clinical Impression       Row Name 10/16/23 1626          Pain    Pretreatment Pain Rating 0/10 - no pain  -PC       Row Name 10/16/23 1626          Plan of Care Review    Plan of Care Reviewed With patient  -PC     Outcome Evaluation Pt feeling better today, nausea and vomiting improved, pt with good effort and participation and was able to sit edge of bed today, she did need max a x 2 to come to a seated position, she fatigued quickly and was only able to sit for approx 3-4 minutes, PT will cont to progress as tolerated  -PC       Row Name 10/16/23 1626          Positioning and Restraints    Pre-Treatment Position in bed  -PC     Post Treatment Position bed  -PC     In Bed supine;call light within reach;encouraged to call for assist  -PC               User Key  (r) = Recorded By, (t) = Taken By, (c) = Cosigned By      Initials Name Provider Type    PC Sabi Kevin PT Physical Therapist                   Outcome Measures       Row Name 10/16/23 1629          How much help from another person do you currently need...    Turning from your back to your side while in flat bed without using bedrails? 2  -PC     Moving from lying on back to sitting on the side of a flat bed without bedrails? 2  -PC     Moving to and from a bed to a chair (including a wheelchair)? 1  -PC     Standing up from a chair using your arms (e.g., wheelchair, bedside chair)? 1  -PC     Climbing 3-5 steps with a railing? 1  -PC     To walk in hospital room? 1  -PC     AM-PAC 6 Clicks Score (PT) 8  -PC     Highest level of mobility 3 --> Sat at edge of bed  -PC               User Key  (r) = Recorded By, (t) = Taken By, (c) = Cosigned By      Initials Name Provider Type    Sabi Marshall, PT Physical Therapist                                 Physical Therapy Education       Title: PT OT SLP Therapies  (Done)       Topic: Physical Therapy (Done)       Point: Mobility training (Done)       Learning Progress Summary             Patient Acceptance, E,D, DU by PC at 10/16/2023 1629    Acceptance, E, VU by BL at 10/14/2023 0630                         Point: Home exercise program (Done)       Learning Progress Summary             Patient Acceptance, E,D, DU by PC at 10/16/2023 1629    Acceptance, E, VU by BL at 10/14/2023 0630                         Point: Body mechanics (Done)       Learning Progress Summary             Patient Acceptance, E,D, DU by PC at 10/16/2023 1629    Acceptance, E, VU by BL at 10/14/2023 0630                         Point: Precautions (Done)       Learning Progress Summary             Patient Acceptance, E,D, DU by PC at 10/16/2023 1629    Acceptance, E,D, VU,NR by MG at 10/15/2023 1148    Acceptance, E, VU by BL at 10/14/2023 0630                                         User Key       Initials Effective Dates Name Provider Type Discipline    PC 06/16/21 -  Sabi Kevin PT Physical Therapist PT    MG 05/24/22 -  Karla Bhatia, PT Physical Therapist PT    BL 10/18/22 -  Zainab Grace, RN Registered Nurse Nurse                  PT Recommendation and Plan     Plan of Care Reviewed With: patient  Outcome Evaluation: Pt feeling better today, nausea and vomiting improved, pt with good effort and participation and was able to sit edge of bed today, she did need max a x 2 to come to a seated position, she fatigued quickly and was only able to sit for approx 3-4 minutes, PT will cont to progress as tolerated     Time Calculation:         PT Charges       Row Name 10/16/23 1630             Time Calculation    Start Time 1601  -PC      Stop Time 1618  -PC      Time Calculation (min) 17 min  -PC      PT Received On 10/16/23  -PC      PT - Next Appointment 10/17/23  -PC                User Key  (r) = Recorded By, (t) = Taken By, (c) = Cosigned By      Initials Name Provider Type    JOSUE Kevin  Sabi BARBA, PT Physical Therapist                  Therapy Charges for Today       Code Description Service Date Service Provider Modifiers Qty    [id number] HC PT THER PROC EA 15 MIN 10/16/2023 Sabi Kevin, PT GP 1            PT G-Codes  Outcome Measure Options: AM-PAC 6 Clicks Daily Activity (OT)  AM-PAC 6 Clicks Score (PT): 8  AM-PAC 6 Clicks Score (OT): 11       Sabi Kevin, PT  10/16/2023

## 2023-10-16 NOTE — CONSULTS
Subjective     REASON FOR CONSULTATION: Metastatic breast cancer  Provide an opinion on any further workup or treatment                             REQUESTING PHYSICIAN: Mena    RECORDS OBTAINED:  Records of the patients history including those obtained from the referring provider were reviewed and summarized in detail.    HISTORY OF PRESENT ILLNESS:  The patient is a 63 y.o. year old female who is here for an opinion about the above issue.    History of Present Illness   This is a 63-year-old woman followed by Dr. Irvin in our practice for metastatic lobular breast cancer which is ER/AR positive on treatment with Faslodex 500 mg IM every 4 weeks and abemaciclib 100 mg twice daily as of her most recent office visit 9/6/2023.  The patient also has anemia of end-stage renal disease/malignancy/treatment related.  Comorbidities include end-stage renal disease on hemodialysis, CHF/aortic stenosis.    The patient was recently admitted for intractable nausea vomiting diarrhea noted to have significant ascites for which she underwent a paracentesis.  Stool was positive for C. difficile toxin but negative for C. difficile antigen consistent with carrier state.  After her paracentesis of 11 L she became progressively weak and dizzy.  She called EMS and was brought to the ER found to be significantly hypotensive treated with fluid boluses but remained hypotensive requiring transfer to the ICU.  Patient also diagnosed with urinary tract infection and ileus (she was using a lot of Imodium at home for diarrhea) requiring NG tube.  She is currently on Rocephin.    Past Medical History:   Diagnosis Date    Anemia     Anxiety and depression     Bicuspid aortic valve     had surgery    Breast cancer 2004    RIGHT, HAD NEELA. 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    History of sepsis 2010    KIDNEY STONE    History of transfusion     no reaction    Hyperlipidemia     Hyperthyroidism     Hypothyroidism     Kidney stone     CURRENT LEFT-DEC 2021    Lymphedema of leg     LEFT    Metastasis from breast cancer 2019    STOMACH-OMENTUM    Neuromuscular disorder     Obesity     ANDREW on CPAP     CPAP    Osteoarthrosis     Peripheral neuropathy     FEET BILAT    Radiculopathy     Rectal bleeding 08/16/2022    Thickened endometrium     Urinary incontinence     wears pads    Weakness     BILAT LEGS-WITH ANY AMT OF TIME        Past Surgical History:   Procedure Laterality Date    AORTIC VALVULOPLASTY  01/2023    ARTERIOVENOUS FISTULA/SHUNT SURGERY Left 11/03/2021    Procedure: LEFT  BRACHIOCEPALIC ARTERIOVENOUS FISTULA;  Surgeon: Dejuan Adler MD;  Location: [facility] OR;  Service: Vascular;  Laterality: Left;    BREAST RECONSTRUCTION  2004    WITH IMPLANTS, AND REVISION, NOW REMOVED    BREAST SURGERY Bilateral 2004    breast implants removed and then replaced due to infection    CARDIAC CATHETERIZATION N/A 08/23/2022    Procedure: CORONARY ANGIOGRAPHY;  Surgeon: Luis Rivers MD;  Location: [facility] CATH INVASIVE LOCATION;  Service: Cardiology;  Laterality: N/A;    CARDIAC CATHETERIZATION N/A 08/23/2022    Procedure: Right Heart Cath;  Surgeon: Luis Rivers MD;  Location: [facility] CATH INVASIVE LOCATION;  Service: Cardiology;  Laterality: N/A;    CARDIAC CATHETERIZATION N/A 01/10/2023    Procedure: Valvuloplasty;  Surgeon: Luis Rivers MD;  Location: [facility] CATH INVASIVE LOCATION;   Service: Cardiovascular;  Laterality: N/A;  01/10/2023    CARDIAC CATHETERIZATION N/A 01/10/2023    Procedure: Aortic root aortogram;  Surgeon: Luis Rivers MD;  Location: [facility] CATH INVASIVE LOCATION;  Service: Cardiovascular;  Laterality: N/A;    CATARACT EXTRACTION WITH INTRAOCULAR LENS IMPLANT Bilateral      SECTION  1987     SECTION  1987    CYSTOSCOPY W/ URETERAL STENT PLACEMENT  2010    CYSTOSCOPY W/ URETERAL STENT PLACEMENT Left 2021    Procedure: CYSTOSCOPY KEFT RETROGRADE PYELOGRAM  LEFT  URETERAL STENT PLACEMENT;  Surgeon: Leodan Mckee Jr., MD;  Location: [facility] MAIN OR;  Service: Urology;  Laterality: Left;    CYSTOSCOPY W/ URETERAL STENT PLACEMENT Left 03/10/2022    Procedure: CYSTOSCOPY AND LEFT URETERAL STENT EXCHANGE, RETROGRADE PYELOGRAM;  Surgeon: Leodan Mckee Jr., MD;  Location: [facility] MAIN OR;  Service: Urology;  Laterality: Left;    CYSTOSCOPY W/ URETERAL STENT PLACEMENT Left 2023    Procedure: CYSTOSCOPY WITH LEFT URETERAL STENT PLACEMENT, RETROGRADE PYELOGRAM, CLOT EVACUATION, BLADDER FULGARATION;  Surgeon: Leodan Mckee Jr., MD;  Location: [facility] MAIN OR;  Service: Urology;  Laterality: Left;    D & C HYSTEROSCOPY N/A 2017    Procedure: DILATATION AND CURETTAGE, HYSTEROSCOPY ;  Surgeon: Cherie Oliveros MD;  Location: [facility] OR [facility];  Service:     D & C HYSTEROSCOPY N/A 2019    Procedure: DILATATION AND CURETTAGE HYSTEROSCOPY/POLYPECTOMY;  Surgeon: Cherie Oliveros MD;  Location: [facility];  Service: Gynecology    D & C HYSTEROSCOPY ENDOMETRIAL ABLATION N/A 2023    Procedure: DILATATION AND CURETTAGE;  Surgeon: Ina Marcos MD;  Location: [facility] MAIN OR;  Service: Obstetrics/Gynecology;  Laterality: N/A;    ENDOSCOPY      INSERTION AND REMOVAL HEMODIALYSIS CATHETER Right 2021    INSERTION HEMODIALYSIS CATHETER Right 2021    Procedure: HEMODIALYSIS CATHETER INSERTION;  Surgeon:  Clint Hernández MD;  Location: [facility] MAIN OR;  Service: Vascular;  Laterality: Right;    LYMPH NODE BIOPSY      MASTECTOMY Bilateral 2004    VENOUS ACCESS DEVICE (PORT) INSERTION AND REMOVAL          No current facility-administered medications on file prior to encounter.     Current Outpatient Medications on File Prior to Encounter   Medication Sig Dispense Refill    Abemaciclib (Verzenio) 100 MG tablet Take 1 tablet by mouth 2 (Two) Times a Day. 56 tablet 5    acetaminophen (TYLENOL) 500 MG tablet Take 2 tablets by mouth As Needed for Mild Pain.      atorvastatin (LIPITOR) 40 MG tablet TAKE ONE TABLET BY MOUTH ONCE NIGHTLY (Patient taking differently: Take 1 tablet by mouth Every Night.) 30 tablet 0    diphenoxylate-atropine (LOMOTIL) 2.5-0.025 MG per tablet Take 1 tablet by mouth 4 (Four) Times a Day As Needed for Diarrhea. 30 tablet 0    folic acid (FOLVITE) 1 MG tablet TAKE 1 TABLET BY MOUTH DAILY (Patient taking differently: Take 1 tablet by mouth Daily.) 30 tablet 2    Fulvestrant (FASLODEX) 250 MG/5ML chemo syringe Inject 0.02 mL into the appropriate muscle as directed by prescriber Every 30 (Thirty) Days. In MD office  Next due: 10/17/23      gabapentin (Neurontin) 300 MG capsule Take 1 capsule by mouth Every Night. 60 capsule 2    insulin detemir (Levemir FlexPen) 100 UNIT/ML injection Inject 50 Units under the skin into the appropriate area as directed Daily. 11 mL 0    levothyroxine (SYNTHROID, LEVOTHROID) 50 MCG tablet Take 1 tablet by mouth Daily. (Patient taking differently: Take 1 tablet by mouth Every Morning.) 90 tablet 1    mupirocin (BACTROBAN) 2 % ointment Apply 1 application  topically to the appropriate area as directed Daily. Applied to fistula insertion sites for dialysis      ondansetron (ZOFRAN) 8 MG tablet TAKE ONE TABLET BY MOUTH EVERY 8 HOURS AS NEEDED FOR NAUSEA OR VOMITING (Patient taking differently: Take 1 tablet by mouth Every 8 (Eight) Hours As Needed.) 18 tablet 2    potassium  chloride ER (K-TAB) 20 MEQ tablet controlled-release ER tablet Take 1 tablet by mouth Daily.      sertraline (ZOLOFT) 100 MG tablet TAKE 1 AND 1/2 TABLET BY MOUTH DAILY (Patient taking differently: Take 1.5 tablets by mouth Daily.) 135 tablet 0    vitamin B-12 (CYANOCOBALAMIN) 1000 MCG tablet Take 1 tablet by mouth Daily.          ALLERGIES:  No Known Allergies     Social History     Socioeconomic History    Marital status:      Spouse name: Rk   Tobacco Use    Smoking status: Never     Passive exposure: Never    Smokeless tobacco: Never   Vaping Use    Vaping Use: Never used   Substance and Sexual Activity    Alcohol use: No    Drug use: Never    Sexual activity: Yes     Partners: Male     Birth control/protection: Post-menopausal        Family History   Problem Relation Age of Onset    Heart attack Mother 72    Coronary artery disease Mother         Mother  from MI at 72    Diabetes Mother     Breast cancer Mother     Hyperlipidemia Mother     Arthritis Mother     Cancer Mother     Heart attack Father 74    Aortic aneurysm Father         Father with ruptured thoracic aortic aneurysm    Coronary artery disease Father         Father  from MI at 74    Heart disease Father     Hyperlipidemia Father     Arthritis Father     Heart attack Maternal Grandmother 76    Coronary artery disease Maternal Grandmother         MGM deceeased from MI at age 76    Malig Hyperthermia Neg Hx         Review of Systems   Constitutional:  Positive for activity change, appetite change and fatigue.   HENT: Negative.     Respiratory: Negative.     Cardiovascular: Negative.    Gastrointestinal:  Positive for abdominal distention, constipation and nausea.   Genitourinary: Negative.  Positive for difficulty urinating.   Neurological:  Positive for weakness.   Hematological: Negative.    Psychiatric/Behavioral: Negative.            Objective     Vitals:    10/16/23 1030 10/16/23 1100 10/16/23 1130 10/16/23 1235   BP:  102/61 105/58 97/71    Pulse: 81 79 80    Resp:       Temp:    97.7 °F (36.5 °C)   TempSrc:    Oral   SpO2: 100% 100% 98%    Weight:       Height:             9/26/2023    10:18 AM   Current Status   ECOG score 3       Physical Exam    CONSTITUTIONAL: pleasant well-developed adult woman in no acute distress  HEENT: no icterus, no thrush, moist membranes, NG tube in place  LYMPH: no cervical or supraclavicular lad  CV: RRR, S1S2, no murmur  RESP: cta bilat, no wheezing, no rales  GI: soft, non-tender, no splenomegaly, +bs, obese  MUSC: 1+ dependent edema  NEURO: alert and oriented x3, mild global weakness  PSYCH: normal mood     RECENT LABS:  Hematology WBC   Date Value Ref Range Status   10/16/2023 6.57 3.40 - 10.80 10*3/mm3 Final     RBC   Date Value Ref Range Status   10/16/2023 2.98 (L) 3.77 - 5.28 10*6/mm3 Final     Hemoglobin   Date Value Ref Range Status   10/16/2023 9.4 (L) 12.0 - 15.9 g/dL Final     Hematocrit   Date Value Ref Range Status   10/16/2023 28.5 (L) 34.0 - 46.6 % Final     Platelets   Date Value Ref Range Status   10/16/2023 108 (L) 140 - 450 10*3/mm3 Final        Lab Results   Component Value Date    GLUCOSE 88 10/16/2023    BUN 23 10/16/2023    CREATININE 4.96 (H) 10/16/2023    EGFR 9.2 (L) 10/16/2023    BCR 4.6 (L) 10/16/2023    K 3.3 (L) 10/16/2023    CO2 25.2 10/16/2023    CALCIUM 8.0 (L) 10/16/2023    PROTENTOTREF 7.0 03/16/2019    ALBUMIN 2.0 (L) 10/14/2023    BILITOT 0.4 10/14/2023    AST 14 10/14/2023    ALT 13 10/14/2023     KUB 10/15/2023-improved gaseous distention      Assessment & Plan     *Stage IV hormone receptor positive lobular breast cancer  7/3/2023-PET showed metastatic disease to bone, hypermetabolic ascites with soft tissue density anterior pelvis (possible peritoneal carcinomatosis), soft tissue thickening left renal pelvis  Paracentesis 10/9/2023-cytology showed rare atypical cells suspicious for lobular carcinoma but ER/SD negative?  Patient currently on hormonal  therapy with monthly Faslodex combined with abemaciclib 100 mg twice daily  Restaging PET scheduled for 10/23/2023 and to see Dr. Irvin 10/27/2023    *Hypotension acute on chronic  Patient developed hypotension after large volume paracentesis (11 L) combined with end-stage renal disease, UTI, AS  Patient now off vasopressors and on midodrine 10 mg 3 times daily  On Rocephin for UTI    *End-stage renal disease on hemodialysis    *Aortic valve stenosis unamenable to intervention per cardiology    *Chronic anemia, multifactorial  Hemoglobin stable 9.4, ferritin 943, iron sat 46%  She received scheduled Retacrit 3 times per week    *Ileus with NG tube  Hopefully secondary to overuse of Imodium and not related to carcinomatous/mechanical  Symptomatically improving with NG decompression-KUB yesterday showed improved gaseous distention of the stomach    *C. difficile carrier      Oncology plan/recommendations:  The patient's blood counts seem to be tolerating abemaciclib (no neutropenia) and responding to antibiotic therapy, so okay to continue  She has PET scan and follow-up with Dr. Irvin scheduled before end of the month  Monitor CBC/labs  If ileus does not resolve with conservative measures, she may need repeat CT abdomen to rule out mechanical obstruction given probable carcinomatosis  Check CA 15-3

## 2023-10-17 ENCOUNTER — APPOINTMENT (OUTPATIENT)
Dept: GENERAL RADIOLOGY | Facility: HOSPITAL | Age: 63
End: 2023-10-17
Payer: MEDICARE

## 2023-10-17 LAB
ANION GAP SERPL CALCULATED.3IONS-SCNC: 15.3 MMOL/L (ref 5–15)
BASOPHILS # BLD AUTO: 0.03 10*3/MM3 (ref 0–0.2)
BASOPHILS NFR BLD AUTO: 0.4 % (ref 0–1.5)
BH BB BLOOD EXPIRATION DATE: NORMAL
BH BB BLOOD TYPE BARCODE: 6200
BH BB DISPENSE STATUS: NORMAL
BH BB PRODUCT CODE: NORMAL
BH BB UNIT NUMBER: NORMAL
BUN SERPL-MCNC: 30 MG/DL (ref 8–23)
BUN/CREAT SERPL: 5 (ref 7–25)
CALCIUM SPEC-SCNC: 8 MG/DL (ref 8.6–10.5)
CHLORIDE SERPL-SCNC: 101 MMOL/L (ref 98–107)
CO2 SERPL-SCNC: 23.7 MMOL/L (ref 22–29)
CREAT SERPL-MCNC: 6.06 MG/DL (ref 0.57–1)
CROSSMATCH INTERPRETATION: NORMAL
DEPRECATED RDW RBC AUTO: 54.1 FL (ref 37–54)
EGFRCR SERPLBLD CKD-EPI 2021: 7.2 ML/MIN/1.73
EOSINOPHIL # BLD AUTO: 0.07 10*3/MM3 (ref 0–0.4)
EOSINOPHIL NFR BLD AUTO: 1 % (ref 0.3–6.2)
ERYTHROCYTE [DISTWIDTH] IN BLOOD BY AUTOMATED COUNT: 15.6 % (ref 12.3–15.4)
GLUCOSE BLDC GLUCOMTR-MCNC: 127 MG/DL (ref 70–130)
GLUCOSE BLDC GLUCOMTR-MCNC: 129 MG/DL (ref 70–130)
GLUCOSE BLDC GLUCOMTR-MCNC: 129 MG/DL (ref 70–130)
GLUCOSE BLDC GLUCOMTR-MCNC: 132 MG/DL (ref 70–130)
GLUCOSE BLDC GLUCOMTR-MCNC: 133 MG/DL (ref 70–130)
GLUCOSE BLDC GLUCOMTR-MCNC: 137 MG/DL (ref 70–130)
GLUCOSE SERPL-MCNC: 136 MG/DL (ref 65–99)
HCT VFR BLD AUTO: 26.4 % (ref 34–46.6)
HGB BLD-MCNC: 8.7 G/DL (ref 12–15.9)
LYMPHOCYTES # BLD AUTO: 0.89 10*3/MM3 (ref 0.7–3.1)
LYMPHOCYTES NFR BLD AUTO: 13.3 % (ref 19.6–45.3)
MCH RBC QN AUTO: 31.6 PG (ref 26.6–33)
MCHC RBC AUTO-ENTMCNC: 33 G/DL (ref 31.5–35.7)
MCV RBC AUTO: 96 FL (ref 79–97)
MONOCYTES # BLD AUTO: 0.65 10*3/MM3 (ref 0.1–0.9)
MONOCYTES NFR BLD AUTO: 9.7 % (ref 5–12)
NEUTROPHILS NFR BLD AUTO: 4.79 10*3/MM3 (ref 1.7–7)
NEUTROPHILS NFR BLD AUTO: 71.9 % (ref 42.7–76)
PLATELET # BLD AUTO: 103 10*3/MM3 (ref 140–450)
PMV BLD AUTO: 11.3 FL (ref 6–12)
POTASSIUM SERPL-SCNC: 4 MMOL/L (ref 3.5–5.2)
RBC # BLD AUTO: 2.75 10*6/MM3 (ref 3.77–5.28)
SODIUM SERPL-SCNC: 140 MMOL/L (ref 136–145)
UNIT  ABO: NORMAL
UNIT  RH: NORMAL
WBC NRBC COR # BLD: 6.68 10*3/MM3 (ref 3.4–10.8)

## 2023-10-17 PROCEDURE — 25010000002 CEFTRIAXONE PER 250 MG: Performed by: INTERNAL MEDICINE

## 2023-10-17 PROCEDURE — 25010000002 ALBUMIN HUMAN 25% PER 50 ML: Performed by: INTERNAL MEDICINE

## 2023-10-17 PROCEDURE — 85025 COMPLETE CBC W/AUTO DIFF WBC: CPT | Performed by: HOSPITALIST

## 2023-10-17 PROCEDURE — 82948 REAGENT STRIP/BLOOD GLUCOSE: CPT

## 2023-10-17 PROCEDURE — 97110 THERAPEUTIC EXERCISES: CPT | Performed by: OCCUPATIONAL THERAPIST

## 2023-10-17 PROCEDURE — 80048 BASIC METABOLIC PNL TOTAL CA: CPT | Performed by: INTERNAL MEDICINE

## 2023-10-17 PROCEDURE — P9047 ALBUMIN (HUMAN), 25%, 50ML: HCPCS | Performed by: INTERNAL MEDICINE

## 2023-10-17 PROCEDURE — 99232 SBSQ HOSP IP/OBS MODERATE 35: CPT | Performed by: INTERNAL MEDICINE

## 2023-10-17 PROCEDURE — 74018 RADEX ABDOMEN 1 VIEW: CPT

## 2023-10-17 RX ORDER — POLYETHYLENE GLYCOL 3350 17 G/17G
17 POWDER, FOR SOLUTION ORAL DAILY
Status: DISCONTINUED | OUTPATIENT
Start: 2023-10-17 | End: 2023-10-26 | Stop reason: HOSPADM

## 2023-10-17 RX ORDER — ALBUMIN (HUMAN) 12.5 G/50ML
12.5 SOLUTION INTRAVENOUS ONCE
Qty: 50 ML | Refills: 0 | Status: COMPLETED | OUTPATIENT
Start: 2023-10-17 | End: 2023-10-17

## 2023-10-17 RX ADMIN — Medication 1 TABLET: at 15:18

## 2023-10-17 RX ADMIN — Medication 10 ML: at 13:06

## 2023-10-17 RX ADMIN — POLYETHYLENE GLYCOL 3350 17 G: 17 POWDER, FOR SOLUTION ORAL at 15:19

## 2023-10-17 RX ADMIN — FOLIC ACID 1 MG: 1 TABLET ORAL at 15:18

## 2023-10-17 RX ADMIN — POTASSIUM CHLORIDE 20 MEQ: 750 TABLET, EXTENDED RELEASE ORAL at 15:19

## 2023-10-17 RX ADMIN — CEFTRIAXONE 2000 MG: 2 INJECTION, POWDER, FOR SOLUTION INTRAMUSCULAR; INTRAVENOUS at 22:02

## 2023-10-17 RX ADMIN — ALBUMIN HUMAN 12.5 G: 0.25 SOLUTION INTRAVENOUS at 12:57

## 2023-10-17 RX ADMIN — Medication 1000 MCG: at 15:18

## 2023-10-17 RX ADMIN — MIDODRINE HYDROCHLORIDE 10 MG: 5 TABLET ORAL at 12:02

## 2023-10-17 RX ADMIN — MIDODRINE HYDROCHLORIDE 10 MG: 5 TABLET ORAL at 08:35

## 2023-10-17 RX ADMIN — MIDODRINE HYDROCHLORIDE 10 MG: 5 TABLET ORAL at 18:12

## 2023-10-17 RX ADMIN — GABAPENTIN 300 MG: 300 CAPSULE ORAL at 22:01

## 2023-10-17 RX ADMIN — SERTRALINE 150 MG: 100 TABLET, FILM COATED ORAL at 15:18

## 2023-10-17 RX ADMIN — Medication 10 ML: at 22:02

## 2023-10-17 NOTE — PROGRESS NOTES
[facility] Medicine Services  PROGRESS NOTE    Patient Name: Juana Hall  : 1960  MRN: [id number]    Date of Admission: 10/13/2023  Primary Care Physician: Kiana Noriega ([place]), BETY    Subjective   Subjective     CC:  Follow-up multiple medical issues including hypotension    Subjective:  Patient says she is passing gas and having small bowel movements.  She feels her nausea is improving.  She would like to try to drink.  Overall feeling much better.    Review of Systems  No current fevers or chills  No current headache or lightheadedness  No current chest pain or palpitations      Objective   Objective     Vital Signs:   Temp:  [97.3 °F (36.3 °C)-98.7 °F (37.1 °C)] 98.4 °F (36.9 °C)  Heart Rate:  [] 90  Resp:  [18-20] 20  BP: ()/(45-63) 103/51        Physical Exam:  Constitutional:Awake, alert, chronically ill-appearing but currently nontoxic  HENT: NG tube is now clamped, NCAT, mucous membranes moist, neck supple  Respiratory: Relatively clear, moderate inspiration, breathing currently nonlabored  Cardiovascular: Pulse rate is normal, normal radial pulses  Gastrointestinal: Severely obese soft, nontender, nondistended  Musculoskeletal: Severely chronically debilitated in appearance, positive for lower extremity edema, BMI 60  Psychiatric: calm affect, cooperative, conversational  Neurologic: No slurred speech or facial droop, follows commands  Skin: No rashes or jaundice, warm      Results Reviewed:  Results from last 7 days   Lab Units 10/17/23  0411 10/16/23  0427 10/15/23  1043 10/14/23  0129 10/13/23  1259 10/13/23  1108   WBC 10*3/mm3 6.68 6.57 4.79   < >  --  5.84   HEMOGLOBIN g/dL 8.7* 9.4* 8.2*   < >  --  8.7*   HEMATOCRIT % 26.4* 28.5* 25.7*   < >  --  26.2*   PLATELETS 10*3/mm3 103* 108* 92*   < >  --  123*   INR   --   --   --   --   --  1.36*   PROCALCITONIN ng/mL  --   --   --   --  3.83*  --     < > = values in this interval not displayed.     Results from last 7 days    Lab Units 10/17/23  0411 10/16/23  0427 10/15/23  0412 10/14/23  0129 10/13/23  1259 10/13/23  1108   SODIUM mmol/L 140 139 139 139  --  136   POTASSIUM mmol/L 4.0 3.3* 3.0* 2.8*  --  2.9*   CHLORIDE mmol/L 101 101 99 99  --  96*   CO2 mmol/L 23.7 25.2 27.0 27.0  --  26.4   BUN mg/dL 30* 23 31* 24*  --  19   CREATININE mg/dL 6.06* 4.96* 6.60* 5.23*  --  4.84*   GLUCOSE mg/dL 136* 88 76 101*  --  156*   CALCIUM mg/dL 8.0* 8.0* 8.3* 8.7  --  8.4*   ALK PHOS U/L  --   --   --  121*  --  115   ALT (SGPT) U/L  --   --   --  13  --  14   AST (SGOT) U/L  --   --   --  14  --  15   HSTROP T ng/L  --   --   --   --  51* 51*   PROBNP pg/mL  --   --   --   --   --  21,914.0*     Estimated Creatinine Clearance: 15.2 mL/min (A) (by C-G formula based on SCr of 6.06 mg/dL (H)).    Microbiology Results Abnormal       Procedure Component Value - Date/Time    Blood Culture - Blood, Arm, Right [[id number]]  (Normal) Collected: 10/14/23 0143    Lab Status: Preliminary result Specimen: Blood from Arm, Right Updated: 10/17/23 0200     Blood Culture No growth at 3 days    Blood Culture - Blood, Arm, Right [[id number]]  (Normal) Collected: 10/13/23 2251    Lab Status: Preliminary result Specimen: Blood from Arm, Right Updated: 10/16/23 2345     Blood Culture No growth at 3 days    COVID PRE-OP / PRE-PROCEDURE SCREENING ORDER (NO ISOLATION) - Swab, Nasopharynx [[id number]]  (Normal) Collected: 10/13/23 1056    Lab Status: Final result Specimen: Swab from Nasopharynx Updated: 10/13/23 1302    Narrative:      The following orders were created for panel order COVID PRE-OP / PRE-PROCEDURE SCREENING ORDER (NO ISOLATION) - Swab, Nasopharynx.  Procedure                               Abnormality         Status                     ---------                               -----------         ------                     COVID-19, HAY IN-HOUSE...[[id number]]  Normal              Final result                 Please view results for these tests on the  individual orders.    COVID-19,[facility] HAY IN-HOUSE CEPHEID/JF NP SWAB IN TRANSPORT MEDIA 8-12 HR TAT - Swab, Nasopharynx [[id number]]  (Normal) Collected: 10/13/23 1056    Lab Status: Final result Specimen: Swab from Nasopharynx Updated: 10/13/23 1302     COVID19 Not Detected    Narrative:      Fact sheet for providers: https://www.fda.gov/media/866400/download     Fact sheet for patients: https://www.fda.gov/media/309445/download            Imaging Results (Last 24 Hours)       Procedure Component Value Units Date/Time    XR Abdomen KUB [[id number]] Resulted: 10/17/23 1316     Updated: 10/17/23 1540    XR Chest 1 View [[id number]] Collected: 10/13/23 1217     Updated: 10/17/23 0655    Narrative:      XR FOOT 3+ VW LEFT-, XR CHEST 1 VW-     HISTORY: 63-year-old female status post multiple falls in the last few  days.     FINDINGS:  LEFT FOOT: There are acute/subacute appearing fractures of the heads of  the 3rd, 4th, and 5th metatarsals. There is possibly a fracture at the  base of the 5th proximal phalanx. There is soft tissue swelling  throughout most of the forefoot and the ventral aspect of the midfoot as  well. There is contour deformity at the proximal metadiaphysis of the  4th metatarsal which may be related to an old fracture, but an acute  fracture cannot be excluded. In addition, there is an ossific density  between the frontal aspect of the talus and the navicular bone which may  represent an accessory ossicle, but if there is focal tenderness in this  region, this may represent an avulsion fragment.     CHEST: The heart is enlarged. There is no CHF. There is a small airspace  opacity at the medial aspect of the right lower lobe which is partly  obscured by EKG wires. A small pneumonia cannot be excluded. Repeat  frontal radiograph is suggested.     This report was finalized on 10/17/2023 6:52 AM by Dr. Lety Alicea M.D  on Workstation: [id number]       XR Foot 3+ View Left [[id number]] Collected: 10/13/23  1217     Updated: 10/17/23 0655    Narrative:      XR FOOT 3+ VW LEFT-, XR CHEST 1 VW-     HISTORY: 63-year-old female status post multiple falls in the last few  days.     FINDINGS:  LEFT FOOT: There are acute/subacute appearing fractures of the heads of  the 3rd, 4th, and 5th metatarsals. There is possibly a fracture at the  base of the 5th proximal phalanx. There is soft tissue swelling  throughout most of the forefoot and the ventral aspect of the midfoot as  well. There is contour deformity at the proximal metadiaphysis of the  4th metatarsal which may be related to an old fracture, but an acute  fracture cannot be excluded. In addition, there is an ossific density  between the frontal aspect of the talus and the navicular bone which may  represent an accessory ossicle, but if there is focal tenderness in this  region, this may represent an avulsion fragment.     CHEST: The heart is enlarged. There is no CHF. There is a small airspace  opacity at the medial aspect of the right lower lobe which is partly  obscured by EKG wires. A small pneumonia cannot be excluded. Repeat  frontal radiograph is suggested.     This report was finalized on 10/17/2023 6:52 AM by Dr. Lety Alicea M.D  on Workstation: [id number]               Results for orders placed during the hospital encounter of 10/13/23    Adult Transthoracic Echo Complete W/ Cont if Necessary Per Protocol    Interpretation Summary    Left ventricular systolic function is normal. Calculated left ventricular EF = 58.4%    Left ventricular diastolic function was normal.    Severe aortic valve stenosis is present. Aortic valve area is 0.9 cm2.    Peak velocity of the flow distal to the aortic valve is 469.1 cm/s. Aortic valve maximum pressure gradient is 88 mmHg. Aortic valve mean pressure gradient is 48 mmHg. Aortic valve dimensionless index is 0.3 .    Estimated right ventricular systolic pressure from tricuspid regurgitation is moderately elevated (45-55  mmHg). Calculated right ventricular systolic pressure from tricuspid regurgitation is 46 mmHg.    There is mild plaque in the aortic arch present.      I have reviewed the medications:  Scheduled Meds:Abemaciclib, 100 mg, Oral, BID  atorvastatin, 40 mg, Oral, Nightly  cefTRIAXone, 2,000 mg, Intravenous, Q24H  epoetin jana/jana-epbx, 20,000 Units, Subcutaneous, Once per day on Mon Wed Fri  folic acid, 1 mg, Oral, Daily  gabapentin, 300 mg, Oral, Nightly  insulin lispro, 2-9 Units, Subcutaneous, Q4H  levothyroxine, 50 mcg, Oral, Q AM  midodrine, 10 mg, Oral, TID AC  multivitamin, 1 tablet, Oral, Daily  mupirocin, 1 application , Topical, Daily  polyethylene glycol, 17 g, Oral, Daily  potassium chloride, 20 mEq, Oral, Daily  potassium chloride, 20 mEq, Oral, Daily  senna-docusate sodium, 2 tablet, Oral, BID  sertraline, 150 mg, Oral, Daily  sodium chloride, 10 mL, Intravenous, Q12H  vitamin B-12, 1,000 mcg, Oral, Daily      Continuous Infusions:     PRN Meds:.  acetaminophen **OR** acetaminophen **OR** acetaminophen    acetaminophen **OR** acetaminophen    acetaminophen    albumin human    senna-docusate sodium **AND** polyethylene glycol **AND** bisacodyl **AND** bisacodyl    dextrose    dextrose    diphenoxylate-atropine    fentaNYL citrate (PF)    glucagon (human recombinant)    HYDROcodone-acetaminophen    influenza vaccine    Menthol-Zinc Oxide    metoclopramide    nitroglycerin    ondansetron **OR** ondansetron    [COMPLETED] Insert Peripheral IV **AND** sodium chloride    sodium chloride    sodium chloride    sodium chloride    Assessment & Plan   Assessment & Plan     Active Hospital Problems    Diagnosis  POA    **Hypotension [I95.9]  Yes    Abnormal urinalysis [R82.90]  Unknown    Hypokalemia [E87.6]  Unknown    Diastolic CHF, chronic [I50.32]  Yes    ANDREW (obstructive sleep apnea) [G47.33]  Yes    Combined systolic and diastolic congestive heart failure [I50.40]  Yes    ESRD (end stage renal disease) [N18.6]   Yes    Morbid obesity with BMI of 50.0-59.9, adult [E66.01, Z68.43]  Not Applicable    Anemia in stage 3 chronic kidney disease [N18.30, D63.1]  Yes    Primary malignant neoplasm of breast with metastasis [C50.919]  Yes    Acquired hypothyroidism [E03.9]  Yes    Diabetes type 2, controlled [E11.9]  Yes      Resolved Hospital Problems   No resolved problems to display.        Brief Hospital Course to date:  Juana Hall is a 63 y.o. female presents the hospital with hypotension    Hypotension  Urinary tract infection  End-stage renal disease on hemodialysis 5 times weekly  Hypokalemia  Chronic diastolic and systolic heart failure  Breast cancer with radical mastectomy and chemotherapy  Hypothyroidism  Diabetes mellitus  Obstructive sleep apnea on CPAP  Multiple falls  Fractures in left foot/ankle  Morbid obesity, very severe  Hypoglycemia  Severe aortic valve stenosis  Ileus status post NG tube placement 10/16    Discussion/plan for today:  Clamp NG tube.  Repeat x-ray to reevaluate ileus.  Patient now seems to be having regain of bowel function with small bowel movements and passing gas.  Initiate MiraLAX and clear diet.  Continue to monitor progression of symptoms.    Continue midodrine 3 times daily for blood support.  Hopefully we can wean this if blood pressure continues to improve.  Seems to be stable perhaps wean midodrine soon depending on clinical course.    Cardiology note reviewed.  Patient is not thought to be a candidate for valvular intervention at this time.  With severe aortic stenosis long-term prognosis unfortunately is thought to be poor.  Although patient is temporarily improving I feel we need to continue a goals of care discussion.  I will consult palliative care to assist with this.  Patient needs to consider her CODE STATUS and perhaps would be a good candidate for outpatient hospice if she stabilizes further.  I have discussed with patient and her long-term goal is to return home and she  understands the complications from her valve issues    Respiratory issues have stabilized.  Discussed with pulmonology they are signing off and following up as needed.    Echocardiogram reviewed and severe valvular disease and pulmonary hypertension noted.  Volume management with dialysis.  Plan for dialysis as per nephrology recommendations.    Recent urinalysis reviewed and subsequent pyuria noted.  Culture was positive for E. coli.  Current treatment is ceftriaxone.  Chest x-ray images reviewed and also possible small right lower lobe pneumonia    Replace electrolytes as needed.  Potassium better today.    Thrombocytopenia likely due to acute illness.  No current bleeding noted.  Continue to trend.  Hopefully will improve with treatment of infection.    Continue postoperative shoe for foot injury.    Morbid obesity significantly complicates all aspects of care.    DVT Prophylaxis: SCDs added      Disposition: Pending clinical course    CODE STATUS:   Code Status and Medical Interventions:   Ordered at: 10/13/23 1443     Code Status (Patient has no pulse and is not breathing):    CPR (Attempt to Resuscitate)     Medical Interventions (Patient has pulse or is breathing):    Full Support       Zoltan San MD  10/17/23

## 2023-10-17 NOTE — PROGRESS NOTES
REASON FOR FOLLOW-UP: Metastatic breast cancer    Interval history:  The patient has been transferred to [facility] out of ICU.  Blood pressure remains too soft for hemodialysis this morning.  She denies nausea vomiting or worsening abdominal pain but still has NG in place.      HISTORY OF PRESENT ILLNESS:   This is a 63-year-old woman followed by Dr. Irvin in our practice for metastatic lobular breast cancer which is ER/OH positive on treatment with Faslodex 500 mg IM every 4 weeks and abemaciclib 100 mg twice daily as of her most recent office visit 9/6/2023.  The patient also has anemia of end-stage renal disease/malignancy/treatment related.  Comorbidities include end-stage renal disease on hemodialysis, CHF/aortic stenosis.     The patient was recently admitted for intractable nausea vomiting diarrhea noted to have significant ascites for which she underwent a paracentesis.  Stool was positive for C. difficile toxin but negative for C. difficile antigen consistent with carrier state.  After her paracentesis of 11 L she became progressively weak and dizzy.  She called EMS and was brought to the ER found to be significantly hypotensive treated with fluid boluses but remained hypotensive requiring transfer to the ICU.  Patient also diagnosed with urinary tract infection and ileus (she was using a lot of Imodium at home for diarrhea) requiring NG tube.  She is currently on Rocephin      Past Medical History, Past Surgical History, Social History, Family History have been reviewed and are without significant changes except as mentioned.    Review of Systems   Constitutional:  Positive for activity change and fatigue.   Respiratory:  Negative for shortness of breath.    Gastrointestinal:  Negative for abdominal pain, nausea and vomiting.   Genitourinary:  Positive for difficulty urinating.   Musculoskeletal:  Positive for gait problem.   Neurological:  Positive for weakness. Negative for light-headedness.    Hematological:  Does not bruise/bleed easily.        Medications:  The current medication list was reviewed in the EMR    ALLERGIES:  No Known Allergies    Objective      Vitals:    10/17/23 0831   BP: (!) 88/54   Pulse: 82   Resp:    Temp:    SpO2:           Physical Exam    CONSTITUTIONAL: pleasant well-developed/obese adult woman in no acute distress  HEENT: no icterus, no thrush, moist membranes, NG tube in place  LYMPH: no cervical or supraclavicular lad  CV: RRR, S1S2, no murmur  RESP: cta bilat, no wheezing, no rales  GI: soft, non-tender, no splenomegaly, +bs, obese  MUSC: 1+ dependent edema  NEURO: alert and oriented x3, mild global weakness  PSYCH: normal mood and affect    RECENT LABS:  Hematology Results from last 7 days   Lab Units 10/17/23  0411 10/16/23  0427 10/15/23  1043   WBC 10*3/mm3 6.68 6.57 4.79   HEMOGLOBIN g/dL 8.7* 9.4* 8.2*   HEMATOCRIT % 26.4* 28.5* 25.7*   PLATELETS 10*3/mm3 103* 108* 92*     2  Lab Results   Component Value Date    GLUCOSE 136 (H) 10/17/2023    BUN 30 (H) 10/17/2023    CREATININE 6.06 (H) 10/17/2023    EGFRIFNONA 11 (L) 02/16/2022    EGFRIFAFRI  01/29/2022      Comment:      <15 Indicative of kidney failure.    BCR 5.0 (L) 10/17/2023    CO2 23.7 10/17/2023    CALCIUM 8.0 (L) 10/17/2023    PROTENTOTREF 7.0 03/16/2019    ALBUMIN 2.0 (L) 10/14/2023    LABIL2 1.2 03/16/2019    AST 14 10/14/2023    ALT 13 10/14/2023       Lab Results   Component Value Date    IRON 46 10/15/2023    TIBC 100 (L) 10/15/2023    FERRITIN 943.00 (H) 10/15/2023       Lab Results   Component Value Date    WDXZKOGA93 1,146 (H) 03/16/2023       Lab Results   Component Value Date    FOLATE >20.00 03/16/2023          Assessment & Plan   *Stage IV hormone receptor positive lobular breast cancer  7/3/2023-PET showed metastatic disease to bone, hypermetabolic ascites with soft tissue density anterior pelvis (possible peritoneal carcinomatosis), soft tissue thickening left renal pelvis  Paracentesis  10/9/2023-cytology showed rare atypical cells suspicious for lobular carcinoma but ER/MD negative?  Patient currently on hormonal therapy with monthly Faslodex combined with abemaciclib 100 mg twice daily  Restaging PET scheduled for 10/23/2023 and to see Dr. Irvin 10/27/2023     *Hypotension acute on chronic  Patient developed hypotension after large volume paracentesis (11 L) combined with end-stage renal disease, UTI, AS  Patient now off vasopressors and on midodrine 10 mg 3 times daily but blood pressure is soft 88/54  On Rocephin for UTI     *End-stage renal disease on hemodialysis     *Aortic valve stenosis unamenable to intervention per cardiology     *Chronic anemia, multifactorial  Hemoglobin stable 8.7, ferritin 943, iron sat 46%  She received scheduled Retacrit 3 times per week     *Ileus with NG tube  Hopefully secondary to overuse of Imodium and not related to carcinomatous/mechanical  Symptomatically improving with NG decompression-KUB 10/15 showed improved gaseous distention of the stomach     *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 Irvin scheduled before end of the month  Monitor CBC/labs  If ileus does not resolve with conservative measures, she may need repeat CT abdomen to rule out mechanical obstruction given probable carcinomatosis  Check CA 15-3-stable/normal 21.3                     10/17/2023      CC:

## 2023-10-17 NOTE — PLAN OF CARE
Goal Outcome Evaluation:  Plan of Care Reviewed With: patient        Progress: improving         Outcome Evaluation: Pt transferred from ICU this shift. Remains on low wall suction. No c/o N/V this shift. NPO. Remains on 2L because pt unable to wear home CPAP with NG. BG monitored. Rocephin continues. Q2 turn. Pt had BM this shift and c/o lots of gas.

## 2023-10-17 NOTE — PLAN OF CARE
Goal Outcome Evaluation:  Plan of Care Reviewed With: patient        Progress: improving  Outcome Evaluation: Pt AOx4. Ng tube clamped per physician's order. Pt tolerated PO meds with sips of meds this shift. Pt had one emesis episode after evening med administration. This nurse informed pt to alert staff if it happened again, as NGT may need to be hooked back to suction- pt agreeable. Assist x2. VSS. Safety maintained.

## 2023-10-17 NOTE — PLAN OF CARE
Goal Outcome Evaluation:  Plan of Care Reviewed With: patient, spouse        Progress: improving  Outcome Evaluation: pt just finished being rolled in bed for xrays, pt tsf to this floor from ICU last night. pt completed UE ex in bed to incr strength and endurance for bed mobility, tsf, and ADLs. pt making progress.

## 2023-10-17 NOTE — PROGRESS NOTES
[facility]     Progress Note    Patient Name: Juana Hall  : 1960  MRN: [id number]  Primary Care Physician:  Kiana Noriega), BETY  Date of admission: 10/13/2023    Subjective   Subjective     Chief Complaint: ESRD    History of Present Illness  Patient with ESRD on home hd. Admitted with hypotension after paracentesis      Review of Systems  Patient with low BP this am. No symptoms. Denies sob or chest paint.   Has NG tube to low wall suction.   Objective   Objective     Vitals:   Temp:  [97.3 °F (36.3 °C)-98.7 °F (37.1 °C)] 97.3 °F (36.3 °C)  Heart Rate:  [] 82  Resp:  [18] 18  BP: ()/(45-71) 88/54  Flow (L/min):  [2] 2    Physical Exam   General Appearance:  In no acute distress.  Obese, chronically ill   HEENT: NG tube in place  Extremities: .  There is no deformity, effusion or dependent edema.    Neurological: Patient is alert   LUE AVF +thrill and bruit. Bruised.     Result Review    Result Review:  I have personally reviewed the results from the time of this admission to 10/17/2023 08:55 EDT and agree with these findings:  [x]  Laboratory list / accordion  []  Microbiology  []  Radiology  []  EKG/Telemetry   []  Cardiology/Vascular   []  Pathology  []  Old records  []  Other:  Most notable findings include:       Assessment & Plan   Assessment / Plan     Brief Patient Summary:  Juana Hall is a 63 y.o. female who has ESRD on home hd    Active Hospital Problems:  Active Hospital Problems    Diagnosis     **Hypotension     Abnormal urinalysis     Hypokalemia     Diastolic CHF, chronic     ANDREW (obstructive sleep apnea)     Combined systolic and diastolic congestive heart failure     ESRD (end stage renal disease)     Morbid obesity with BMI of 50.0-59.9, adult     Anemia in stage 3 chronic kidney disease     Primary malignant neoplasm of breast with metastasis     Acquired hypothyroidism     Diabetes type 2, controlled      Plan:   Hypotension    Diabetes type 2,  controlled    Acquired hypothyroidism    Primary malignant neoplasm of breast with metastasis    Anemia in stage 3 chronic kidney disease    Morbid obesity with BMI of 50.0-59.9, adult    ESRD (end stage renal disease)    Combined systolic and diastolic congestive heart failure    ANDREW (obstructive sleep apnea)    Diastolic CHF, chronic    Abnormal urinalysis    Hypokalemia    Currently BP too low for HD and UF   Will wait to see response to midodrine and will give a dose of 25% albumin IV. Holding HD this am, hopefully this afternoon    Carmen Brown MD  [facility]

## 2023-10-17 NOTE — THERAPY TREATMENT NOTE
Patient Name: Juana Hall  : 1960    MRN: [id number]                              Today's Date: 10/17/2023       Admit Date: 10/13/2023    Visit Dx:     ICD-10-CM ICD-9-CM   1. Hypotension, unspecified hypotension type  I95.9 458.9   2. Acute UTI  N39.0 599.0   3. End-stage renal disease on hemodialysis  N18.6 585.6    Z99.2 V45.11   4. Other fracture of left foot, initial encounter for closed fracture  S92.812A 825.20   5. Anemia in stage 3 chronic kidney disease, unspecified whether stage 3a or 3b CKD  N18.30 285.21    D63.1 585.3     Patient Active Problem List   Diagnosis    Anxiety    Depression    Diabetes type 2, controlled    Hyperlipidemia    Heart murmur    Hypertension    Post-traumatic osteoarthritis of multiple joints    Psoriasis    Radiculopathy    Acquired hypothyroidism    Peripheral neuropathy    Normocytic anemia    History of right breast cancer    Omental mass    Primary malignant neoplasm of breast with metastasis    Elevated serum creatinine    Iron deficiency anemia    Anemia in stage 3 chronic kidney disease    Stage 3b chronic kidney disease    Anxiety and depression    RYAN (acute kidney injury)    Anemia, chronic disease    Diastolic CHF, chronic    Frequent UTI    Metabolic acidosis    Severe malnutrition    Hydronephrosis    Generalized weakness    COVID-19 virus detected    Hypocalcemia    Morbid obesity with BMI of 50.0-59.9, adult    ESRD (end stage renal disease)    Pancytopenia due to chemotherapy    Chronic anemia    Rectal bleeding    Bicuspid aortic valve    Complicated UTI (urinary tract infection)    Acute UTI (urinary tract infection)    Severe aortic stenosis    Combined systolic and diastolic congestive heart failure    Depression    ANDREW (obstructive sleep apnea)    Diastolic CHF, chronic    Adverse effect of chemotherapy    UTI (urinary tract infection)    Hematuria    Dysuria    Intractable vomiting    Hypotension    Abnormal urinalysis    Hypokalemia     Past  Medical History:   Diagnosis Date    Anemia     Anxiety and depression     Bicuspid aortic valve     had surgery    Breast cancer 2004    RIGHT, HAD NEELA. 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    History of sepsis 2010    KIDNEY STONE    History of transfusion     no reaction    Hyperlipidemia     Hyperthyroidism     Hypothyroidism     Kidney stone     CURRENT LEFT-DEC 2021    Lymphedema of leg     LEFT    Metastasis from breast cancer 2019    STOMACH-OMENTUM    Neuromuscular disorder     Obesity     ANDREW on CPAP     CPAP    Osteoarthrosis     Peripheral neuropathy     FEET BILAT    Radiculopathy     Rectal bleeding 08/16/2022    Thickened endometrium     Urinary incontinence     wears pads    Weakness     BILAT LEGS-WITH ANY AMT OF TIME     Past Surgical History:   Procedure Laterality Date    AORTIC VALVULOPLASTY  01/2023    ARTERIOVENOUS FISTULA/SHUNT SURGERY Left 11/03/2021    Procedure: LEFT  BRACHIOCEPALIC ARTERIOVENOUS FISTULA;  Surgeon: Dejuan Adler MD;  Location: [facility] MAIN OR;  Service: Vascular;  Laterality: Left;    BREAST RECONSTRUCTION  2004    WITH IMPLANTS, AND REVISION, NOW REMOVED    BREAST SURGERY Bilateral 2004    breast implants removed and then replaced due to infection    CARDIAC CATHETERIZATION N/A 08/23/2022    Procedure: CORONARY ANGIOGRAPHY;  Surgeon: Luis Rivers MD;  Location: [facility] CATH INVASIVE LOCATION;  Service: Cardiology;  Laterality:  N/A;    CARDIAC CATHETERIZATION N/A 2022    Procedure: Right Heart Cath;  Surgeon: Luis Rivers MD;  Location: [facility] CATH INVASIVE LOCATION;  Service: Cardiology;  Laterality: N/A;    CARDIAC CATHETERIZATION N/A 01/10/2023    Procedure: Valvuloplasty;  Surgeon: Luis Rivers MD;  Location: [facility] CATH INVASIVE LOCATION;  Service: Cardiovascular;  Laterality: N/A;  01/10/2023    CARDIAC CATHETERIZATION N/A 01/10/2023    Procedure: Aortic root aortogram;  Surgeon: Luis Rivers MD;  Location[facility] CATH INVASIVE LOCATION;  Service: Cardiovascular;  Laterality: N/A;    CATARACT EXTRACTION WITH INTRAOCULAR LENS IMPLANT Bilateral      SECTION  1987     SECTION  1987    CYSTOSCOPY W/ URETERAL STENT PLACEMENT      CYSTOSCOPY W/ URETERAL STENT PLACEMENT Left 2021    Procedure: CYSTOSCOPY KEFT RETROGRADE PYELOGRAM  LEFT  URETERAL STENT PLACEMENT;  Surgeon: Leodan Mckee Jr., MD;  Location: [facility] OR;  Service: Urology;  Laterality: Left;    CYSTOSCOPY W/ URETERAL STENT PLACEMENT Left 03/10/2022    Procedure: CYSTOSCOPY AND LEFT URETERAL STENT EXCHANGE, RETROGRADE PYELOGRAM;  Surgeon: Leodan Mckee Jr., MD;  Location: [facility] OR;  Service: Urology;  Laterality: Left;    CYSTOSCOPY W/ URETERAL STENT PLACEMENT Left 2023    Procedure: CYSTOSCOPY WITH LEFT URETERAL STENT PLACEMENT, RETROGRADE PYELOGRAM, CLOT EVACUATION, BLADDER FULGARATION;  Surgeon: Leodan Mckee Jr., MD;  Location: [facility] MAIN OR;  Service: Urology;  Laterality: Left;    D & C HYSTEROSCOPY N/A 2017    Procedure: DILATATION AND CURETTAGE, HYSTEROSCOPY ;  Surgeon: Cherie Oliveros MD;  Location: [facility] OR [facility];  Service:     D & C HYSTEROSCOPY N/A 2019    Procedure: DILATATION AND CURETTAGE HYSTEROSCOPY/POLYPECTOMY;  Surgeon: Cherie Oliveros MD;  [facility];  Service: Gynecology    D & C HYSTEROSCOPY ENDOMETRIAL ABLATION N/A  5/5/2023    Procedure: DILATATION AND CURETTAGE;  Surgeon: Ina Marcos MD;  Location: [facility] MAIN OR;  Service: Obstetrics/Gynecology;  Laterality: N/A;    ENDOSCOPY      INSERTION AND REMOVAL HEMODIALYSIS CATHETER Right 05/01/2021    INSERTION HEMODIALYSIS CATHETER Right 05/01/2021    Procedure: HEMODIALYSIS CATHETER INSERTION;  Surgeon: Clint Hernández MD;  Location: [facility] MAIN OR;  Service: Vascular;  Laterality: Right;    LYMPH NODE BIOPSY      MASTECTOMY Bilateral 2004    VENOUS ACCESS DEVICE (PORT) INSERTION AND REMOVAL        General Information       Row Name 10/17/23 1543          OT Time and Intention    Document Type therapy note (daily note)  -     Mode of Treatment individual therapy;occupational therapy  -       Row Name 10/17/23 1543          General Information    Existing Precautions/Restrictions fall  -       Row Name 10/17/23 1543          Cognition    Orientation Status (Cognition) oriented x 4  -       Row Name 10/17/23 1543          Safety Issues, Functional Mobility    Impairments Affecting Function (Mobility) endurance/activity tolerance;balance  -               User Key  (r) = Recorded By, (t) = Taken By, (c) = Cosigned By      Initials Name Provider Type    Gloria العراقي OTR Occupational Therapist                     Mobility/ADL's       Row Name 10/17/23 1544          Bed Mobility    Comment, (Bed Mobility) pt just finished rolling in bed to have xrays done.  -               User Key  (r) = Recorded By, (t) = Taken By, (c) = Cosigned By      Initials Name Provider Type    Gloria العراقي OTR Occupational Therapist                   Obj/Interventions       Row Name 10/17/23 1549          Shoulder (Therapeutic Exercise)    Shoulder (Therapeutic Exercise) AROM (active range of motion)  -     Shoulder AROM (Therapeutic Exercise) right;left;flexion;extension;scapular protraction;scapular elevation;scapular retraction;supine;2 sets;10 repetitions   -       Row Name 10/17/23 1545          Elbow/Forearm (Therapeutic Exercise)    Elbow/Forearm (Therapeutic Exercise) AROM (active range of motion)  -     Elbow/Forearm AROM (Therapeutic Exercise) bilateral;flexion;extension;supination;pronation;supine;10 repetitions;2 sets  -       Row Name 10/17/23 1545          Motor Skills    Therapeutic Exercise elbow/forearm;shoulder  -       Row Name 10/17/23 1545          Balance    Comment, Balance NT  -               User Key  (r) = Recorded By, (t) = Taken By, (c) = Cosigned By      Initials Name Provider Type    Gloria العراقي OTR Occupational Therapist                   Goals/Plan    No documentation.                  Clinical Impression       Row Name 10/17/23 1546          Pain Assessment    Pretreatment Pain Rating 0/10 - no pain  -     Posttreatment Pain Rating 0/10 - no pain  -       Row Name 10/17/23 1546          Plan of Care Review    Plan of Care Reviewed With patient;spouse  -     Progress improving  -     Outcome Evaluation pt just finished being rolled in bed for xrays, pt tsf to this floor from ICU last night. pt completed UE ex in bed to incr strength and endurance for bed mobility, tsf, and ADLs. pt making progress.  -       Row Name 10/17/23 1546          Positioning and Restraints    Pre-Treatment Position in bed  -KP     Post Treatment Position bed  -KP     In Bed supine;call light within reach;encouraged to call for assist;exit alarm on;with family/caregiver  -               User Key  (r) = Recorded By, (t) = Taken By, (c) = Cosigned By      Initials Name Provider Type    Gloria العراقي OTR Occupational Therapist                   Outcome Measures       Row Name 10/17/23 1547          How much help from another is currently needed...    Putting on and taking off regular lower body clothing? 1  -KP     Bathing (including washing, rinsing, and drying) 1  -KP     Toileting (which includes using toilet bed pan  or urinal) 1  -KP     Putting on and taking off regular upper body clothing 2  -KP     Taking care of personal grooming (such as brushing teeth) 3  -KP     Eating meals 3  -KP     AM-PAC 6 Clicks Score (OT) 11  -       Row Name 10/17/23 1547          Functional Assessment    Outcome Measure Options AM-PAC 6 Clicks Daily Activity (OT)  -               User Key  (r) = Recorded By, (t) = Taken By, (c) = Cosigned By      Initials Name Provider Type    Gloria العراقي OTR Occupational Therapist                    Occupational Therapy Education       Title: PT OT SLP Therapies (Done)       Topic: Occupational Therapy (Done)       Point: Home exercise program (Done)       Description:   Instruct learner(s) on appropriate technique for monitoring, assisting and/or progressing therapeutic exercises/activities.                  Learning Progress Summary             Patient DEMAR Villatoro VU by MEE at 10/15/2023 1152    Comment: ed on HEP to do in bed                                         User Key       Initials Effective Dates Name Provider Type Discipline    MEE 01/03/23 -  Kely Manjarrez, OT Occupational Therapist OT                  OT Recommendation and Plan     Plan of Care Review  Plan of Care Reviewed With: patient, spouse  Progress: improving  Outcome Evaluation: pt just finished being rolled in bed for xrays, pt tsf to this floor from ICU last night. pt completed UE ex in bed to incr strength and endurance for bed mobility, tsf, and ADLs. pt making progress.     Time Calculation:         Time Calculation- OT       Row Name 10/17/23 1548             Time Calculation- OT    OT Start Time 1458  -      OT Stop Time 1507  -      OT Time Calculation (min) 9 min  -      Total Timed Code Minutes- OT 9 minute(s)  -      OT Received On 10/17/23  -      OT - Next Appointment 10/18/23  -         Timed Charges    73307 - OT Therapeutic Exercise Minutes 9  -KP         Total Minutes    Timed Charges Total  Minutes 9  -KP       Total Minutes 9  -KP                User Key  (r) = Recorded By, (t) = Taken By, (c) = Cosigned By      Initials Name Provider Type    Gloria العراقي OTR Occupational Therapist                  Therapy Charges for Today       Code Description Service Date Service Provider Modifiers Qty    [id number]  OT THER PROC EA 15 MIN 10/17/2023 Gloria Marcum OTR GO 1                 PATRICK Middleton  10/17/2023

## 2023-10-17 NOTE — CASE MANAGEMENT/SOCIAL WORK
Discharge Planning Assessment  [facility]     Patient Name: Juana Hall  MRN: [id number]  Today's Date: 10/17/2023    Admit Date: 10/13/2023    Plan: SNF vs Home, depending on clinical course   Discharge Needs Assessment       Row Name 10/17/23 1623       Living Environment    People in Home spouse    Name(s) of People in Home Rk Hall [phone]    Current Living Arrangements home    Potentially Unsafe Housing Conditions none    In the past 12 months has the electric, gas, oil, or water company threatened to shut off services in your home? No    Primary Care Provided by self    Provides Primary Care For no one    Family Caregiver if Needed spouse    Family Caregiver Names Rk Hall    Quality of Family Relationships helpful;involved;supportive    Able to Return to Prior Arrangements other (see comments)  May need SNF       Resource/Environmental Concerns    Resource/Environmental Concerns none    Transportation Concerns none       Food Insecurity    Within the past 12 months, you worried that your food would run out before you got the money to buy more. Never true    Within the past 12 months, the food you bought just didn't last and you didn't have money to get more. Never true       Transition Planning    Patient/Family Anticipates Transition to other (see comments)  May need short term SNF placement with dialysis    Patient/Family Anticipated Services at Transition skilled nursing    Transportation Anticipated family or friend will provide;health plan transportation       Discharge Needs Assessment    Current Outpatient/Agency/Support Group skilled nursing facility    Equipment Currently Used at Home shower chair;rollator    Concerns to be Addressed no discharge needs identified    Anticipated Changes Related to Illness none    Equipment Needed After Discharge none    Provided Post Acute Provider List? N/A    Provided Post Acute Provider Quality & Resource List? N/A                    Discharge Plan       Row Name 10/17/23 1625       Plan    Plan SNF vs Home, depending on clinical course    Roadmap to Recovery Yes    Patient/Family in Agreement with Plan yes    Provided Post Acute Provider List? Yes    Post Acute Provider List Nursing Home  Road to Recovery    Provided Post Acute Provider Quality & Resource List? Yes    Post Acute Provider Quality and Resource List --  SNF    Delivered To Patient    Method of Delivery In person    Plan Comments Met with pt at bedside. Permission obtained to speak to pt in front of spouse. Introduced self and explained role of . Face sheet verified, PCP is Kiana Noriega. Pt denies any difficulty paying for medications and she obtains her medications from [facility]. Pt lives with her spouse, who provides assistance with her care needs. Pt does HD in home, 5 days/week, and her spouse does dialysis ([facility]). Prior to hospitalization, pt was somewhat independent in ADL's and used a rollator for mobility. Pt denies the need for any other assistive devices. Many years ago she had home health and been to rehab, but she cannot remember which agency or where she was at. Pt and spouse both stated that she may need short term rehab with the goal of returning home. Provider list given to pt. with the facilities marked that has dialysis available. Pt will need transportation if she goes to rehab. Explained that CCP would follow to assess for discharge needs.                  Continued Care and Services - Admitted Since 10/13/2023    Coordination has not been started for this encounter.       Selected Continued Care - Episodes Includes continued care and service providers with selected services from the active episodes listed below      Oncology- External Fill Episode start date: 11/15/2021   There are no active outsourced providers for this episode.                 Expected Discharge Date and Time       Expected Discharge Date Expected Discharge Time     Oct 20, 2023            Demographic Summary    No documentation.                  Functional Status    No documentation.                  Psychosocial    No documentation.                  Abuse/Neglect    No documentation.                  Legal    No documentation.                  Substance Abuse    No documentation.                  Patient Forms    No documentation.                     Anjali Cerrato RN

## 2023-10-17 NOTE — NURSING NOTE
Vital signs were given to Dr. Brown, order to hold dialysis for today and she will assess the patient tomorrow for possible dialysis then.

## 2023-10-18 LAB
ANION GAP SERPL CALCULATED.3IONS-SCNC: 14.2 MMOL/L (ref 5–15)
BACTERIA SPEC AEROBE CULT: NORMAL
BASOPHILS # BLD AUTO: 0.01 10*3/MM3 (ref 0–0.2)
BASOPHILS NFR BLD AUTO: 0.2 % (ref 0–1.5)
BUN SERPL-MCNC: 44 MG/DL (ref 8–23)
BUN/CREAT SERPL: 5.9 (ref 7–25)
CALCIUM SPEC-SCNC: 7.5 MG/DL (ref 8.6–10.5)
CHLORIDE SERPL-SCNC: 99 MMOL/L (ref 98–107)
CO2 SERPL-SCNC: 25.8 MMOL/L (ref 22–29)
CREAT SERPL-MCNC: 7.41 MG/DL (ref 0.57–1)
DEPRECATED RDW RBC AUTO: 53.8 FL (ref 37–54)
EGFRCR SERPLBLD CKD-EPI 2021: 5.7 ML/MIN/1.73
EOSINOPHIL # BLD AUTO: 0.04 10*3/MM3 (ref 0–0.4)
EOSINOPHIL NFR BLD AUTO: 0.6 % (ref 0.3–6.2)
ERYTHROCYTE [DISTWIDTH] IN BLOOD BY AUTOMATED COUNT: 15.5 % (ref 12.3–15.4)
GLUCOSE BLDC GLUCOMTR-MCNC: 121 MG/DL (ref 70–130)
GLUCOSE BLDC GLUCOMTR-MCNC: 131 MG/DL (ref 70–130)
GLUCOSE BLDC GLUCOMTR-MCNC: 99 MG/DL (ref 70–130)
GLUCOSE SERPL-MCNC: 143 MG/DL (ref 65–99)
HCT VFR BLD AUTO: 26.5 % (ref 34–46.6)
HGB BLD-MCNC: 8.4 G/DL (ref 12–15.9)
IMM GRANULOCYTES # BLD AUTO: 0.19 10*3/MM3 (ref 0–0.05)
IMM GRANULOCYTES NFR BLD AUTO: 3 % (ref 0–0.5)
LYMPHOCYTES # BLD AUTO: 0.57 10*3/MM3 (ref 0.7–3.1)
LYMPHOCYTES NFR BLD AUTO: 8.9 % (ref 19.6–45.3)
MCH RBC QN AUTO: 30.8 PG (ref 26.6–33)
MCHC RBC AUTO-ENTMCNC: 31.7 G/DL (ref 31.5–35.7)
MCV RBC AUTO: 97.1 FL (ref 79–97)
MONOCYTES # BLD AUTO: 0.63 10*3/MM3 (ref 0.1–0.9)
MONOCYTES NFR BLD AUTO: 9.8 % (ref 5–12)
NEUTROPHILS NFR BLD AUTO: 4.97 10*3/MM3 (ref 1.7–7)
NEUTROPHILS NFR BLD AUTO: 77.5 % (ref 42.7–76)
NRBC BLD AUTO-RTO: 0 /100 WBC (ref 0–0.2)
PLATELET # BLD AUTO: 116 10*3/MM3 (ref 140–450)
PMV BLD AUTO: 10.8 FL (ref 6–12)
POTASSIUM SERPL-SCNC: 3.6 MMOL/L (ref 3.5–5.2)
RBC # BLD AUTO: 2.73 10*6/MM3 (ref 3.77–5.28)
SODIUM SERPL-SCNC: 139 MMOL/L (ref 136–145)
WBC NRBC COR # BLD: 6.41 10*3/MM3 (ref 3.4–10.8)

## 2023-10-18 PROCEDURE — 80048 BASIC METABOLIC PNL TOTAL CA: CPT | Performed by: INTERNAL MEDICINE

## 2023-10-18 PROCEDURE — 99221 1ST HOSP IP/OBS SF/LOW 40: CPT | Performed by: STUDENT IN AN ORGANIZED HEALTH CARE EDUCATION/TRAINING PROGRAM

## 2023-10-18 PROCEDURE — 25010000002 EPOETIN ALFA-EPBX 20000 UNIT/ML SOLUTION: Performed by: INTERNAL MEDICINE

## 2023-10-18 PROCEDURE — 99232 SBSQ HOSP IP/OBS MODERATE 35: CPT | Performed by: NURSE PRACTITIONER

## 2023-10-18 PROCEDURE — 82948 REAGENT STRIP/BLOOD GLUCOSE: CPT

## 2023-10-18 PROCEDURE — 25010000002 ONDANSETRON PER 1 MG: Performed by: HOSPITALIST

## 2023-10-18 PROCEDURE — 25010000002 ALBUMIN HUMAN 25% PER 50 ML: Performed by: INTERNAL MEDICINE

## 2023-10-18 PROCEDURE — 85025 COMPLETE CBC W/AUTO DIFF WBC: CPT | Performed by: HOSPITALIST

## 2023-10-18 PROCEDURE — 97530 THERAPEUTIC ACTIVITIES: CPT

## 2023-10-18 PROCEDURE — 97110 THERAPEUTIC EXERCISES: CPT

## 2023-10-18 PROCEDURE — P9047 ALBUMIN (HUMAN), 25%, 50ML: HCPCS | Performed by: INTERNAL MEDICINE

## 2023-10-18 PROCEDURE — 99232 SBSQ HOSP IP/OBS MODERATE 35: CPT | Performed by: INTERNAL MEDICINE

## 2023-10-18 RX ORDER — ALBUMIN (HUMAN) 12.5 G/50ML
25 SOLUTION INTRAVENOUS ONCE
Status: COMPLETED | OUTPATIENT
Start: 2023-10-18 | End: 2023-10-18

## 2023-10-18 RX ADMIN — MIDODRINE HYDROCHLORIDE 10 MG: 5 TABLET ORAL at 22:09

## 2023-10-18 RX ADMIN — Medication 10 ML: at 08:08

## 2023-10-18 RX ADMIN — ONDANSETRON 8 MG: 2 INJECTION INTRAMUSCULAR; INTRAVENOUS at 11:19

## 2023-10-18 RX ADMIN — MIDODRINE HYDROCHLORIDE 10 MG: 5 TABLET ORAL at 08:07

## 2023-10-18 RX ADMIN — EPOETIN ALFA-EPBX 20000 UNITS: 20000 INJECTION, SOLUTION INTRAVENOUS; SUBCUTANEOUS at 08:07

## 2023-10-18 RX ADMIN — Medication 10 ML: at 20:25

## 2023-10-18 RX ADMIN — MUPIROCIN 1 APPLICATION: 20 OINTMENT TOPICAL at 08:07

## 2023-10-18 RX ADMIN — ALBUMIN (HUMAN) 25 G: 0.25 INJECTION, SOLUTION INTRAVENOUS at 10:35

## 2023-10-18 NOTE — PROGRESS NOTES
HOSPITAL FOLLOW UP NOTE    Patient Name: Juana Hall  Patient : 1960        Date of Service:10/18/23  Provider of Service: BETY Harmon  Place of Service: [facility]  Referral Provider: Moose Pete MD          Follow Up: severe degenerative aortic stenosis    Interval Hx: off floor        OBJECTIVE  Temp:  [97.3 °F (36.3 °C)-98.4 °F (36.9 °C)] 97.3 °F (36.3 °C)  Heart Rate:  [82-90] 87  Resp:  [18-20] 18  BP: ()/(50-59) 103/59     Intake/Output Summary (Last 24 hours) at 10/18/2023 0823  Last data filed at 10/18/2023 0249  Gross per 24 hour   Intake 390 ml   Output 1305 ml   Net -915 ml     Body mass index is 57.17 kg/m².      10/14/23  1634 10/17/23  0537 10/18/23  0510   Weight: (!) 176 kg (387 lb) (!) 168 kg (371 lb 1.6 oz) (!) 166 kg (365 lb)         Physical Exam:     Patient off unit      CURRENT MEDS    Scheduled Meds:Abemaciclib, 100 mg, Oral, BID  atorvastatin, 40 mg, Oral, Nightly  epoetin jana/jana-epbx, 20,000 Units, Subcutaneous, Once per day on   folic acid, 1 mg, Oral, Daily  gabapentin, 300 mg, Oral, Nightly  insulin lispro, 2-9 Units, Subcutaneous, Q4H  levothyroxine, 50 mcg, Oral, Q AM  midodrine, 10 mg, Oral, TID AC  multivitamin, 1 tablet, Oral, Daily  mupirocin, 1 application , Topical, Daily  polyethylene glycol, 17 g, Oral, Daily  potassium chloride, 20 mEq, Oral, Daily  potassium chloride, 20 mEq, Oral, Daily  senna-docusate sodium, 2 tablet, Oral, BID  sertraline, 150 mg, Oral, Daily  sodium chloride, 10 mL, Intravenous, Q12H  vitamin B-12, 1,000 mcg, Oral, Daily      Continuous Infusions:       Lab Review:   Results from last 7 days   Lab Units 10/18/23  0416 10/17/23  0411 10/15/23  0412 10/14/23  0129 10/13/23  1108   SODIUM mmol/L 139 140   < > 139 136   POTASSIUM mmol/L 3.6 4.0   < > 2.8* 2.9*   CHLORIDE mmol/L 99 101   < > 99 96*   CO2 mmol/L 25.8 23.7   < > 27.0 26.4   BUN mg/dL 44* 30*   < > 24* 19   CREATININE mg/dL 7.41*  6.06*   < > 5.23* 4.84*   GLUCOSE mg/dL 143* 136*   < > 101* 156*   CALCIUM mg/dL 7.5* 8.0*   < > 8.7 8.4*   AST (SGOT) U/L  --   --   --  14 15   ALT (SGPT) U/L  --   --   --  13 14    < > = values in this interval not displayed.         Results from last 7 days   Lab Units 10/18/23  0416 10/17/23  0411   WBC 10*3/mm3 6.41 6.68   HEMOGLOBIN g/dL 8.4* 8.7*   HEMATOCRIT % 26.5* 26.4*   PLATELETS 10*3/mm3 116* 103*     Results from last 7 days   Lab Units 10/13/23  1108   INR  1.36*     Results from last 7 days   Lab Units 10/13/23  1108   MAGNESIUM mg/dL 1.7         Results from last 7 days   Lab Units 10/13/23  1108   PROBNP pg/mL 21,914.0*     Results from last 7 days   Lab Units 10/13/23  1108   TSH uIU/mL 2.810     2D Echocardiogram 10/14/2023:    Left ventricular systolic function is normal. Calculated left ventricular EF = 58.4%    Left ventricular diastolic function was normal.    Severe aortic valve stenosis is present. Aortic valve area is 0.9 cm2.    Peak velocity of the flow distal to the aortic valve is 469.1 cm/s. Aortic valve maximum pressure gradient is 88 mmHg. Aortic valve mean pressure gradient is 48 mmHg. Aortic valve dimensionless index is 0.3 .    Estimated right ventricular systolic pressure from tricuspid regurgitation is moderately elevated (45-55 mmHg). Calculated right ventricular systolic pressure from tricuspid regurgitation is 46 mmHg.    There is mild plaque in the aortic arch present.    ASSESSMENT & PLAN    Hypotension    Diabetes type 2, controlled    Acquired hypothyroidism    Primary malignant neoplasm of breast with metastasis    Anemia in stage 3 chronic kidney disease    Morbid obesity with BMI of 50.0-59.9, adult    ESRD (end stage renal disease)    Combined systolic and diastolic congestive heart failure    ANDREW (obstructive sleep apnea)    Diastolic CHF, chronic    Abnormal urinalysis    Hypokalemia    1.  Hypotension/shock: BP soft  2.  Urinary tract infection  3.  End-stage  renal disease: On hemodialysis  4.  Metastatic breast cancer with radical mastectomy and chemotherapy  5.  Severe aortic stenosis: Seen by structural heart team and not a candidate for valve intervention  6.  Chronic diastolic heart failure: LVEF 58%, severe AS  7.  Ascites: Status post large-volume paracentesis (11 L removed 10/09/23)  8.  Anemia      Sherine Badillo, APRN  10/18/23

## 2023-10-18 NOTE — PLAN OF CARE
Goal Outcome Evaluation:              Outcome Evaluation: Some improvement with independence w/ supine<>sit, still limited by nausea w/ dry heaving.  Static sitting balance with CGA.  Recommend DC to SNU.      Anticipated Discharge Disposition (PT): skilled nursing facility

## 2023-10-18 NOTE — CONSULTS
"Attempted to speak with patient and spouse at bedside. Introduced self and explained palliative care services. Spouse stated \"we are not ready for hospice\" and patient stated \"we are not ready for palliative care, I am going to get better\". We will sign off.   "

## 2023-10-18 NOTE — THERAPY TREATMENT NOTE
Patient Name: Juana Hall  : 1960    MRN: [id number]                              Today's Date: 10/18/2023       Admit Date: 10/13/2023    Visit Dx:     ICD-10-CM ICD-9-CM   1. Hypotension, unspecified hypotension type  I95.9 458.9   2. Acute UTI  N39.0 599.0   3. End-stage renal disease on hemodialysis  N18.6 585.6    Z99.2 V45.11   4. Other fracture of left foot, initial encounter for closed fracture  S92.812A 825.20   5. Anemia in stage 3 chronic kidney disease, unspecified whether stage 3a or 3b CKD  N18.30 285.21    D63.1 585.3     Patient Active Problem List   Diagnosis    Anxiety    Depression    Diabetes type 2, controlled    Hyperlipidemia    Heart murmur    Hypertension    Post-traumatic osteoarthritis of multiple joints    Psoriasis    Radiculopathy    Acquired hypothyroidism    Peripheral neuropathy    Normocytic anemia    History of right breast cancer    Omental mass    Primary malignant neoplasm of breast with metastasis    Elevated serum creatinine    Iron deficiency anemia    Anemia in stage 3 chronic kidney disease    Stage 3b chronic kidney disease    Anxiety and depression    RYAN (acute kidney injury)    Anemia, chronic disease    Diastolic CHF, chronic    Frequent UTI    Metabolic acidosis    Severe malnutrition    Hydronephrosis    Generalized weakness    COVID-19 virus detected    Hypocalcemia    Morbid obesity with BMI of 50.0-59.9, adult    ESRD (end stage renal disease)    Pancytopenia due to chemotherapy    Chronic anemia    Rectal bleeding    Bicuspid aortic valve    Complicated UTI (urinary tract infection)    Acute UTI (urinary tract infection)    Severe aortic stenosis    Combined systolic and diastolic congestive heart failure    Depression    ANDREW (obstructive sleep apnea)    Diastolic CHF, chronic    Adverse effect of chemotherapy    UTI (urinary tract infection)    Hematuria    Dysuria    Intractable vomiting    Hypotension    Abnormal urinalysis    Hypokalemia     Past  Medical History:   Diagnosis Date    Anemia     Anxiety and depression     Bicuspid aortic valve     had surgery    Breast cancer 2004    RIGHT, HAD NEELA. 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    History of sepsis 2010    KIDNEY STONE    History of transfusion     no reaction    Hyperlipidemia     Hyperthyroidism     Hypothyroidism     Kidney stone     CURRENT LEFT-DEC 2021    Lymphedema of leg     LEFT    Metastasis from breast cancer 2019    STOMACH-OMENTUM    Neuromuscular disorder     Obesity     ANDREW on CPAP     CPAP    Osteoarthrosis     Peripheral neuropathy     FEET BILAT    Radiculopathy     Rectal bleeding 08/16/2022    Thickened endometrium     Urinary incontinence     wears pads    Weakness     BILAT LEGS-WITH ANY AMT OF TIME     Past Surgical History:   Procedure Laterality Date    AORTIC VALVULOPLASTY  01/2023    ARTERIOVENOUS FISTULA/SHUNT SURGERY Left 11/03/2021    Procedure: LEFT  BRACHIOCEPALIC ARTERIOVENOUS FISTULA;  Surgeon: Dejuan Adler MD;  Location: [facility] MAIN OR;  Service: Vascular;  Laterality: Left;    BREAST RECONSTRUCTION  2004    WITH IMPLANTS, AND REVISION, NOW REMOVED    BREAST SURGERY Bilateral 2004    breast implants removed and then replaced due to infection    CARDIAC CATHETERIZATION N/A 08/23/2022    Procedure: CORONARY ANGIOGRAPHY;  Surgeon: Luis Rivers MD;  Location: [facility] CATH INVASIVE LOCATION;  Service: Cardiology;  Laterality:  N/A;    CARDIAC CATHETERIZATION N/A 2022    Procedure: Right Heart Cath;  Surgeon: Luis Rivers MD;  Location: [facility] CATH INVASIVE LOCATION;  Service: Cardiology;  Laterality: N/A;    CARDIAC CATHETERIZATION N/A 01/10/2023    Procedure: Valvuloplasty;  Surgeon: Luis Rivers MD;  [facility]ASIVE LOC[facility];  Service: Cardiovascular;  Laterality: N/A;  01/10/2023    CARDIAC CATHETERIZATION N/A 01/10/2023    Procedure: Aortic root aortogram;  Surgeon: Luis Rivers MD;  [facility];  Service: Cardiovascular;  Laterality: N/A;    CATARACT EXTRACTION WITH INTRAOCULAR LENS IMPLANT Bilateral      SECTION  1987     SECTION  1987    CYSTOSCOPY W/ URETERAL STENT PLACEMENT      CYSTOSCOPY W/ URETERAL STENT PLACEMENT Left 2021    Procedure: CYSTOSCOPY KEFT RETROGRADE PYELOGRAM  LEFT  URETERAL STENT PLACEMENT;  Surgeon: Leodan Mckee Jr., MD;  Location: [facility] OR;  Service: Urology;  Laterality: Left;    CYSTOSCOPY W/ URETERAL STENT PLACEMENT Left 03/10/2022    Procedure: CYSTOSCOPY AND LEFT URETERAL STENT EXCHANGE, RETROGRADE PYELOGRAM;  Surgeon: Leodan Mckee Jr., MD;  Location: [facility] OR;  Service: Urology;  Laterality: Left;    CYSTOSCOPY W/ URETERAL STENT PLACEMENT Left 2023    Procedure: CYSTOSCOPY WITH LEFT URETERAL STENT PLACEMENT, RETROGRADE PYELOGRAM, CLOT EVACUATION, BLADDER FULGARATION;  Surgeon: Leodan Mckee Jr., MD;  Location: [facility] MAIN OR;  Service: Urology;  Laterality: Left;    D & C HYSTEROSCOPY N/A 2017    Procedure: DILATATION AND CURETTAGE, HYSTEROSCOPY ;  Surgeon: Cherie Oliveros MD;  Location: [facility] OR [facility];  Service:     D & C HYSTEROSCOPY N/A 2019    Procedure: DILATATION AND CURETTAGE HYSTEROSCOPY/POLYPECTOMY;  Surgeon: Cherie Oliveros MD;  [facility];  Service: Gynecology    D & C HYSTEROSCOPY ENDOMETRIAL ABLATION N/A  5/5/2023    Procedure: DILATATION AND CURETTAGE;  Surgeon: Ina Marcos MD;  Location: [facility] OR;  Service: Obstetrics/Gynecology;  Laterality: N/A;    ENDOSCOPY      INSERTION AND REMOVAL HEMODIALYSIS CATHETER Right 05/01/2021    INSERTION HEMODIALYSIS CATHETER Right 05/01/2021    Procedure: HEMODIALYSIS CATHETER INSERTION;  Surgeon: Clint Hernández MD;  Location: [facility] OR;  Service: Vascular;  Laterality: Right;    LYMPH NODE BIOPSY      MASTECTOMY Bilateral 2004    VENOUS ACCESS DEVICE (PORT) INSERTION AND REMOVAL        General Information       Row Name 10/18/23 1533          Physical Therapy Time and Intention    Document Type therapy note (daily note)  -AR     Mode of Treatment physical therapy  -AR       Row Name 10/18/23 1533          General Information    Patient Profile Reviewed yes  -AR     Existing Precautions/Restrictions fall  -AR     Barriers to Rehab medically complex  -AR       Row Name 10/18/23 1533          Living Environment    People in Home spouse  -AR       Row Name 10/18/23 1533          Cognition    Orientation Status (Cognition) oriented x 3  -AR       Row Name 10/18/23 1533          Safety Issues, Functional Mobility    Impairments Affecting Function (Mobility) endurance/activity tolerance;balance;strength  -AR               User Key  (r) = Recorded By, (t) = Taken By, (c) = Cosigned By      Initials Name Provider Type    AR Maria E Negrete PT Physical Therapist                   Mobility       Row Name 10/18/23 1534          Bed Mobility    Scooting/Bridging [place] (Bed Mobility) moderate assist (50% patient effort);2 person assist  -AR     Assistive Device (Bed Mobility) draw sheet;head of bed elevated;bed rails  -AR     Comment, (Bed Mobility) supine<>sit twice, somewhat impulsively returned to supine  -AR       Row Name 10/18/23 1534          Sit-Stand Transfer    Comment, (Sit-Stand Transfer) pt declined to attempt 2/2 nausea  -AR               User Key  (r)  = Recorded By, (t) = Taken By, (c) = Cosigned By      Initials Name Provider Type    AR Maria E Negrete, PT Physical Therapist                   Obj/Interventions       Row Name 10/18/23 1540          Motor Skills    Therapeutic Exercise --  B Ap, heel slides, very small range on L LE  -AR               User Key  (r) = Recorded By, (t) = Taken By, (c) = Cosigned By      Initials Name Provider Type    AR Maria E Negrete, PT Physical Therapist                   Goals/Plan    No documentation.                  Clinical Impression       Row Name 10/18/23 1541          Pain    Pretreatment Pain Rating 0/10 - no pain  -AR     Posttreatment Pain Rating 0/10 - no pain  -AR     Pain Intervention(s) Medication (See MAR);Repositioned  -AR       Row Name 10/18/23 1541          Plan of Care Review    Outcome Evaluation Some improvement with independence w/ supine<>sit, still limited by nausea w/ dry heaving.  Static sitting balance with CGA.  Recommend DC to SNU.  -AR       Row Name 10/18/23 1541          Therapy Assessment/Plan (PT)    Rehab Potential (PT) fair, will monitor progress closely  -AR     Criteria for Skilled Interventions Met (PT) yes  -AR     Therapy Frequency (PT) 5 times/wk  -AR       Row Name 10/18/23 1541          Vital Signs    O2 Delivery Pre Treatment room air  -AR       Row Name 10/18/23 1541          Positioning and Restraints    Pre-Treatment Position in bed  -AR     Post Treatment Position bed  -AR     In Bed notified nsg;supine;call light within reach;encouraged to call for assist;exit alarm on  -AR               User Key  (r) = Recorded By, (t) = Taken By, (c) = Cosigned By      Initials Name Provider Type    AR Maria E Negrete, PT Physical Therapist                   Outcome Measures       Row Name 10/18/23 1548          How much help from another person do you currently need...    Turning from your back to your side while in flat bed without using bedrails? 2  -AR     Moving from lying on back to  sitting on the side of a flat bed without bedrails? 2  -AR     Moving to and from a bed to a chair (including a wheelchair)? 1  -AR     Standing up from a chair using your arms (e.g., wheelchair, bedside chair)? 1  -AR     Climbing 3-5 steps with a railing? 1  -AR     To walk in hospital room? 1  -AR     AM-PAC 6 Clicks Score (PT) 8  -AR     Highest level of mobility 3 --> Sat at edge of bed  -AR       Row Name 10/18/23 1548          Functional Assessment    Outcome Measure Options AM-PAC 6 Clicks Basic Mobility (PT)  -AR               User Key  (r) = Recorded By, (t) = Taken By, (c) = Cosigned By      Initials Name Provider Type    Maria E Cortés PT Physical Therapist                                 Physical Therapy Education       Title: PT OT SLP Therapies (In Progress)       Topic: Physical Therapy (In Progress)       Point: Mobility training (In Progress)       Learning Progress Summary             Patient Acceptance, E, NR by AR at 10/18/2023 1549    Acceptance, E,D, DU by PC at 10/16/2023 1629    Acceptance, E, VU by BL at 10/14/2023 0630                         Point: Home exercise program (In Progress)       Learning Progress Summary             Patient Acceptance, E, NR by AR at 10/18/2023 1549    Acceptance, E,D, DU by PC at 10/16/2023 1629    Acceptance, E, VU by BL at 10/14/2023 0630                         Point: Body mechanics (In Progress)       Learning Progress Summary             Patient Acceptance, E, NR by AR at 10/18/2023 1549    Acceptance, E,D, DU by PC at 10/16/2023 1629    Acceptance, E, VU by BL at 10/14/2023 0630                         Point: Precautions (In Progress)       Learning Progress Summary             Patient Acceptance, E, NR by AR at 10/18/2023 1549    Acceptance, E,D, DU by PC at 10/16/2023 1629    Acceptance, E,D, VU,NR by MG at 10/15/2023 1148    Acceptance, E, VU by BL at 10/14/2023 0630                                         User Key       Initials Effective Dates  Name Provider Type Discipline    PC 06/16/21 -  Sabi Kevin PT Physical Therapist PT    AR 06/16/21 -  Maria E Negrete PT Physical Therapist PT    MG 05/24/22 -  Karla Bhatia, PT Physical Therapist PT    BL 10/18/22 -  Zainab Grace, RN Registered Nurse Nurse                  PT Recommendation and Plan     Outcome Evaluation: Some improvement with independence w/ supine<>sit, still limited by nausea w/ dry heaving.  Static sitting balance with CGA.  Recommend DC to SNU.     Time Calculation:         PT Charges       Row Name 10/18/23 1532             Time Calculation    Start Time 1306  -AR      Stop Time 1334  -AR      Time Calculation (min) 28 min  -AR      PT Received On 10/18/23  -AR      PT - Next Appointment 10/19/23  -AR                User Key  (r) = Recorded By, (t) = Taken By, (c) = Cosigned By      Initials Name Provider Type    AR Maria E Negrete, PT Physical Therapist                  Therapy Charges for Today       Code Description Service Date Service Provider Modifiers Qty    78419123087 HC PT THER PROC EA 15 MIN 10/18/2023 Maria E Negrete, PT GP 1    97547313287 HC PT THERAPEUTIC ACT EA 15 MIN 10/18/2023 Maria E Negrete, PT GP 1    63783921582 HC PT THER SUPP EA 15 MIN 10/18/2023 Maria E Negrete, PT GP 2            PT G-Codes  Outcome Measure Options: AM-PAC 6 Clicks Basic Mobility (PT)  AM-PAC 6 Clicks Score (PT): 8  AM-PAC 6 Clicks Score (OT): 11  PT Discharge Summary  Anticipated Discharge Disposition (PT): skilled nursing facility    Maria E Negrete PT  10/18/2023

## 2023-10-18 NOTE — PLAN OF CARE
Goal Outcome Evaluation:  Plan of Care Reviewed With: patient        Progress: improving  Outcome Evaluation: Pt AOX4. Hooked back to intermittent low wall suction. Approximately 650mL of gasstric contents emptied. Pt c/o that taking pills orally causes gagging with tube in place. Underwent dialysis today. VSS. Safety maintained.

## 2023-10-18 NOTE — PROGRESS NOTES
[facility] Medicine Services  PROGRESS NOTE    Patient Name: Juana Hall  : 1960  MRN: [id number]    Date of Admission: 10/13/2023  Primary Care Physician: Kiana Noriega APRN ([place])    Subjective   Subjective     CC:  Follow-up multiple medical issues including hypotension    Subjective:  Patient seen this morning receiving dialysis.  She was feeling pretty well.  She reported a large bowel movement.  She was still having intermittent nausea and vomiting on the clear liquid diet.  I instructed her to avoid carbonated drinks.  NG tube still in place.      Review of Systems  No current fevers or chills  No current headache or lightheadedness  No current chest pain or palpitations      Objective   Objective     Vital Signs:   Temp:  [97.3 °F (36.3 °C)-98.4 °F (36.9 °C)] 97.3 °F (36.3 °C)  Heart Rate:  [87-90] 87  Resp:  [18-20] 18  BP: (103-106)/(50-59) 103/59        Physical Exam:  Constitutional:Awake, alert, chronically ill-appearing but currently nontoxic  HENT: NG tube is in place, NCAT, mucous membranes moist, neck supple  Respiratory: Relatively clear, moderate inspiration, breathing currently nonlabored  Cardiovascular: Pulse rate is normal, normal radial pulses  Gastrointestinal: Severely obese soft, nontender, nondistended  Musculoskeletal: Severely chronically debilitated in appearance, positive for lower extremity edema, BMI 60  Psychiatric: calm affect, cooperative, conversational  Neurologic: No slurred speech or facial droop, follows commands  Skin: No rashes or jaundice, warm      Results Reviewed:  Results from last 7 days   Lab Units 10/18/23  0416 10/17/23  0411 10/16/23  0427 10/14/23  0129 10/13/23  1259 10/13/23  1108   WBC 10*3/mm3 6.41 6.68 6.57   < >  --  5.84   HEMOGLOBIN g/dL 8.4* 8.7* 9.4*   < >  --  8.7*   HEMATOCRIT % 26.5* 26.4* 28.5*   < >  --  26.2*   PLATELETS 10*3/mm3 116* 103* 108*   < >  --  123*   INR   --   --   --   --   --  1.36*   PROCALCITONIN ng/mL  --    --   --   --  3.83*  --     < > = values in this interval not displayed.     Results from last 7 days   Lab Units 10/18/23  0416 10/17/23  0411 10/16/23  0427 10/15/23  0412 10/14/23  0129 10/13/23  1259 10/13/23  1108   SODIUM mmol/L 139 140 139   < > 139  --  136   POTASSIUM mmol/L 3.6 4.0 3.3*   < > 2.8*  --  2.9*   CHLORIDE mmol/L 99 101 101   < > 99  --  96*   CO2 mmol/L 25.8 23.7 25.2   < > 27.0  --  26.4   BUN mg/dL 44* 30* 23   < > 24*  --  19   CREATININE mg/dL 7.41* 6.06* 4.96*   < > 5.23*  --  4.84*   GLUCOSE mg/dL 143* 136* 88   < > 101*  --  156*   CALCIUM mg/dL 7.5* 8.0* 8.0*   < > 8.7  --  8.4*   ALK PHOS U/L  --   --   --   --  121*  --  115   ALT (SGPT) U/L  --   --   --   --  13  --  14   AST (SGOT) U/L  --   --   --   --  14  --  15   HSTROP T ng/L  --   --   --   --   --  51* 51*   PROBNP pg/mL  --   --   --   --   --   --  21,914.0*    < > = values in this interval not displayed.     Estimated Creatinine Clearance: 12.3 mL/min (A) (by C-G formula based on SCr of 7.41 mg/dL (H)).    Microbiology Results Abnormal       Procedure Component Value - Date/Time    Blood Culture - Blood, Arm, Right [[id number]]  (Normal) Collected: 10/14/23 0143    Lab Status: Preliminary result Specimen: Blood from Arm, Right Updated: 10/18/23 0201     Blood Culture No growth at 4 days    Blood Culture - Blood, Arm, Right [[id number]]  (Normal) Collected: 10/13/23 2251    Lab Status: Preliminary result Specimen: Blood from Arm, Right Updated: 10/17/23 2331     Blood Culture No growth at 4 days    COVID PRE-OP / PRE-PROCEDURE SCREENING ORDER (NO ISOLATION) - Swab, Nasopharynx [[id number]]  (Normal) Collected: 10/13/23 1056    Lab Status: Final result Specimen: Swab from Nasopharynx Updated: 10/13/23 1302    Narrative:      The following orders were created for panel order COVID PRE-OP / PRE-PROCEDURE SCREENING ORDER (NO ISOLATION) - Swab, Nasopharynx.  Procedure                               Abnormality         Status                      ---------                               -----------         ------                     COVID-19,[facility]-HOUSE...[[id number]]  Normal              Final result                 Please view results for these tests on the individual orders.    COVID-19,[facility]HOU[facility] CEPHEID/JF NP SWAB IN TRANSPORT MEDIA 8-12 HR TAT - Swab, Nasopharynx [[id number]]  (Normal) Collected: 10/13/23 1056    Lab Status: Final result Specimen: Swab from Nasopharynx Updated: 10/13/23 1302     COVID19 Not Detected    Narrative:      Fact sheet for providers: https://www.fda.gov/media/656112/download     Fact sheet for patients: https://www.fda.gov/media/579386/download            Imaging Results (Last 24 Hours)       Procedure Component Value Units Date/Time    XR Abdomen KUB [[id number]] Collected: 10/17/23 1624     Updated: 10/17/23 1829    Addenda:        ADDENDUM: Additional images of the abdomen were subsequently obtained  after the initial interpretation, with improved visualization of bowel  loops. Gas and stool are present in the rectum, and abundant stool in  the rectum could be evidence of rectal stool impaction, correlate  clinically. No supine evidence for free intraperitoneal gas. Small bowel  in the left aspect of the abdomen is mildly dilated that may reflect  ileus, although the possibility of early or partial small bowel  obstruction is not excluded, close follow-up advised as indications  persist. If further imaging evaluation is indicated, CT could be  obtained. NG tube extends of the left aspect of the stomach. A left  ureteral stent is in place.      This report was finalized on 10/17/2023 6:26 PM by Dr. Jd Serna M.D on Workstation: [id number]     Signed: 10/17/23 1826 by Jd Serna MD    Narrative:      XR ABDOMEN KUB-10/17/2023     HISTORY: Ileus. Now passing gas.     4 images are submitted. The images are somewhat underpenetrated due to  patient's size. Double-J left ureteral stent is seen  in good position.  There is moderate amount of bowel gas some which appears to be in small  bowel segments which may be at the upper limits of normal for diameter.  No significant bowel dilatation is thought to be present. There are  degenerative changes in the spine. NG tube courses into the stomach.       Impression:      1. Limited study due to patient's body habitus. A few segments of small  bowel are at the upper limits of normal for diameter.  2. No definite bowel dilatation is seen.        This report was finalized on 10/17/2023 4:25 PM by Dr. Onofre Rodriguez M.D on Workstation: [id number]               Results for orders placed during the hospital encounter of 10/13/23    Adult Transthoracic Echo Complete W/ Cont if Necessary Per Protocol    Interpretation Summary    Left ventricular systolic function is normal. Calculated left ventricular EF = 58.4%    Left ventricular diastolic function was normal.    Severe aortic valve stenosis is present. Aortic valve area is 0.9 cm2.    Peak velocity of the flow distal to the aortic valve is 469.1 cm/s. Aortic valve maximum pressure gradient is 88 mmHg. Aortic valve mean pressure gradient is 48 mmHg. Aortic valve dimensionless index is 0.3 .    Estimated right ventricular systolic pressure from tricuspid regurgitation is moderately elevated (45-55 mmHg). Calculated right ventricular systolic pressure from tricuspid regurgitation is 46 mmHg.    There is mild plaque in the aortic arch present.      I have reviewed the medications:  Scheduled Meds:Abemaciclib, 100 mg, Oral, BID  atorvastatin, 40 mg, Oral, Nightly  epoetin jana/jana-epbx, 20,000 Units, Subcutaneous, Once per day on Mon Wed Fri  folic acid, 1 mg, Oral, Daily  gabapentin, 300 mg, Oral, Nightly  insulin lispro, 2-9 Units, Subcutaneous, Q4H  levothyroxine, 50 mcg, Oral, Q AM  midodrine, 10 mg, Oral, TID AC  multivitamin, 1 tablet, Oral, Daily  mupirocin, 1 application , Topical, Daily  polyethylene glycol,  17 g, Oral, Daily  potassium chloride, 20 mEq, Oral, Daily  potassium chloride, 20 mEq, Oral, Daily  senna-docusate sodium, 2 tablet, Oral, BID  sertraline, 150 mg, Oral, Daily  sodium chloride, 10 mL, Intravenous, Q12H  vitamin B-12, 1,000 mcg, Oral, Daily      Continuous Infusions:     PRN Meds:.  acetaminophen **OR** acetaminophen **OR** acetaminophen    acetaminophen **OR** acetaminophen    acetaminophen    albumin human    senna-docusate sodium **AND** polyethylene glycol **AND** bisacodyl **AND** bisacodyl    dextrose    dextrose    diphenoxylate-atropine    fentaNYL citrate (PF)    glucagon (human recombinant)    HYDROcodone-acetaminophen    influenza vaccine    Menthol-Zinc Oxide    metoclopramide    nitroglycerin    ondansetron **OR** ondansetron    [COMPLETED] Insert Peripheral IV **AND** sodium chloride    sodium chloride    sodium chloride    sodium chloride    Assessment & Plan   Assessment & Plan     Active Hospital Problems    Diagnosis  POA    **Hypotension [I95.9]  Yes    Abnormal urinalysis [R82.90]  Unknown    Hypokalemia [E87.6]  Unknown    Diastolic CHF, chronic [I50.32]  Yes    ANDREW (obstructive sleep apnea) [G47.33]  Yes    Combined systolic and diastolic congestive heart failure [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        Brief Hospital Course to date:  Juana Hall is a 63 y.o. female presents the hospital with hypotension    Hypotension  Urinary tract infection  End-stage renal disease on hemodialysis 5 times weekly  Hypokalemia  Chronic diastolic and systolic heart failure  Breast cancer with radical  mastectomy and chemotherapy  Hypothyroidism  Diabetes mellitus  Obstructive sleep apnea on CPAP  Multiple falls  Fractures in left foot/ankle  Morbid obesity, very severe  Hypoglycemia  Severe aortic valve stenosis  Ileus status post NG tube placement 10/16    Discussion/plan for today:  Patient had some more vomiting this morning despite starting to have bowel movements.  I have asked nursing to hook the NG tube back up to suction.  KUB reviewed and concern for ileus versus less likely small bowel obstruction.  Plan to consult general surgery just to have them weigh in.  Unclear if patient could benefit from a small bowel follow-through study?  Hopefully we can clamp tube again later today depending on tube output.  She seems to be regaining bowel function.  Continue to monitor progression of symptoms.    Blood pressure reasonable today.  Continue midodrine 3 times daily for blood support.  Hopefully we can wean this if blood pressure continues to improve.  Seems to be stable perhaps wean midodrine soon depending on clinical course.    Cardiology note reviewed.  Patient is not thought to be a candidate for valvular intervention at this time.  With severe aortic stenosis long-term prognosis unfortunately is thought to be poor.  Although patient is temporarily improving I feel we need to continue a goals of care discussion.  I will consult palliative care to assist with this.  Patient needs to consider her CODE STATUS and perhaps would be a good candidate for outpatient hospice if she stabilizes further.  I have discussed with patient and her long-term goal is to return home and she understands the complications from her valve issues.  Awaiting palliative team input.  Continue to broach the subject daily.    Respiratory issues have stabilized.  Discussed with pulmonology signed off upon transfer out of the ICU.    Echocardiogram reviewed and severe valvular disease and pulmonary hypertension noted.  Volume management  with dialysis.  Plan for dialysis as per nephrology recommendations.    Recent urinalysis reviewed and subsequent pyuria noted.  Culture was positive for E. coli.  Completed treatment for ceftriaxone.   Chest x-ray images reviewed and also possible small right lower lobe pneumonia.  Currently afebrile and reasonably he medically stable.    Replace electrolytes as needed.  Potassium better today.    Platelets better today.  Thrombocytopenia likely due to acute illness.  No current bleeding noted.  Continue to trend.  Hopefully will improve with treatment of infection.    Continue postoperative shoe for foot injury.  Physical therapy    Morbid obesity significantly complicates all aspects of care.    DVT Prophylaxis: SCDs added      Disposition: Pending clinical course    CODE STATUS:   Code Status and Medical Interventions:   Ordered at: 10/13/23 1443     Code Status (Patient has no pulse and is not breathing):    CPR (Attempt to Resuscitate)     Medical Interventions (Patient has pulse or is breathing):    Full Support       Zoltan San MD  10/18/23

## 2023-10-18 NOTE — PLAN OF CARE
Goal Outcome Evaluation:              Outcome Evaluation: VSS. DURING NIGHT AT 0249 SHE SAID SHE HAD DRANK A SMALL AMT. OF H2O AND THEN  VOMITED. CONNECTED NG BACK UP TO LOW WALL SUCTION  CC DARK BROWN LIQUID RETURNED IMMEDIATELY AND THEN SHE FELT BETTER. THEN CLAMPED TUBE AGAIN. NO FURTHER PROBLEMS REMAINDER OF NIGHT.

## 2023-10-18 NOTE — CONSULTS
General Surgery Consult    Summary:    Mrs. Juana Hall is a 63 y.o. year old lady with metastatic breast cancer, possible peritoneal carcinomatosis, and an ileus versus small bowel obstruction.  Continue n.p.o., NG to low wall suction.  We will recheck a CT scan as her last cross-sectional imaging was about 3 weeks ago. Will follow up tomorrow.    Chief Complaint:    Constipation    History of Present Illness:    Mrs. Juana Hall is a 63 y.o. year old lady admitted 10/13/2023 with a history of breast cancer status postmastectomy and chemotherapy, end-stage renal disease on hemodialysis admitted with abdominal pain, nausea, and vomiting.  She underwent a paracentesis at that time with 11 L removed.  Since then she complains of progressive weakness and dizziness and has had multiple falls.    Since being here she is overall done okay.  She had an NG tube placed 2 days ago for nausea and vomiting.  She was constipated although she did begin having bowel movements yesterday evening.  NG output is about 1200 cc for the last 2 days.    Past Medical History:   Past Medical History:   Diagnosis Date    Anemia     Anxiety and depression     Bicuspid aortic valve     had surgery    Breast cancer 2004    RIGHT, HAD NEELA. 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    History of sepsis 2010    KIDNEY STONE    History of transfusion     no reaction    Hyperlipidemia     Hyperthyroidism      Hypothyroidism     Kidney stone     CURRENT LEFT-DEC 2021    Lymphedema of leg     LEFT    Metastasis from breast cancer 2019    STOMACH-OMENTUM    Neuromuscular disorder     Obesity     ANDREW on CPAP     CPAP    Osteoarthrosis     Peripheral neuropathy     FEET BILAT    Radiculopathy     Rectal bleeding 2022    Thickened endometrium     Urinary incontinence     wears pads    Weakness     BILAT LEGS-WITH ANY AMT OF TIME      Past Surgical History:    Past Surgical History:   Procedure Laterality Date    AORTIC VALVULOPLASTY  2023    ARTERIOVENOUS FISTULA/SHUNT SURGERY Left 2021    Procedure: LEFT  BRACHIOCEPALIC ARTERIOVENOUS FISTULA;  Surgeon: Dejuan Adler MD;  Location: [facility] MAIN OR;  Service: Vascular;  Laterality: Left;    BREAST RECONSTRUCTION  2004    WITH IMPLANTS, AND REVISION, NOW REMOVED    BREAST SURGERY Bilateral     breast implants removed and then replaced due to infection    CARDIAC CATHETERIZATION N/A 2022    Procedure: CORONARY ANGIOGRAPHY;  Surgeon: Luis Rivers MD;  Location:  [facility] CATH INVASIVE LOCATION;  Service: Cardiology;  Laterality: N/A;    CARDIAC CATHETERIZATION N/A 2022    Procedure: Right Heart Cath;  Surgeon: Luis Rivers MD;  [facility];  Service: Cardiology;  Laterality: N/A;    CARDIAC CATHETERIZATION N/A 01/10/2023    Procedure: Valvuloplasty;  Surgeon: Luis Rivers MD;  [facility];  Service: Cardiovascular;  Laterality: N/A;  01/10/2023    CARDIAC CATHETERIZATION N/A 01/10/2023    Procedure: Aortic root aortogram;  Surgeon: Luis Rivers MD;  [facility];  Service: Cardiovascular;  Laterality: N/A;    CATARACT EXTRACTION WITH INTRAOCULAR LENS IMPLANT Bilateral      SECTION  1987     SECTION  1987    CYSTOSCOPY W/ URETERAL STENT PLACEMENT      CYSTOSCOPY W/ URETERAL STENT  PLACEMENT Left 2021    Procedure: CYSTOSCOPY KEFT RETROGRADE PYELOGRAM  LEFT  URETERAL STENT PLACEMENT;  Surgeon: Leodan Mckee Jr., MD;  Location: [facility] MAIN OR;  Service: Urology;  Laterality: Left;    CYSTOSCOPY W/ URETERAL STENT PLACEMENT Left 03/10/2022    Procedure: CYSTOSCOPY AND LEFT URETERAL STENT EXCHANGE, RETROGRADE PYELOGRAM;  Surgeon: Leodan Mckee Jr., MD;  Location: [facility] MAIN OR;  Service: Urology;  Laterality: Left;    CYSTOSCOPY W/ URETERAL STENT PLACEMENT Left 2023    Procedure: CYSTOSCOPY WITH LEFT URETERAL STENT PLACEMENT, RETROGRADE PYELOGRAM, CLOT EVACUATION, BLADDER FULGARATION;  Surgeon: Leodan Mckee Jr., MD;  Location: [facility] MAIN OR;  Service: Urology;  Laterality: Left;    D & C HYSTEROSCOPY N/A 2017    Procedure: DILATATION AND CURETTAGE, HYSTEROSCOPY ;  Surgeon: Cherie Oliveros MD;  Location: [facility] OR [facility];  Service:     D & C HYSTEROSCOPY N/A 2019    Procedure: DILATATION AND CURETTAGE HYSTEROSCOPY/POLYPECTOMY;  Surgeon: Cherie Oliveros MD;  [facility];  Service: Gynecology    D & C HYSTEROSCOPY ENDOMETRIAL ABLATION N/A 2023    Procedure: DILATATION AND CURETTAGE;  Surgeon: Ina Marcos MD;  Location: [facility] OR;  Service: Obstetrics/Gynecology;  Laterality: N/A;    ENDOSCOPY      INSERTION AND REMOVAL HEMODIALYSIS CATHETER Right 2021    INSERTION HEMODIALYSIS CATHETER Right 2021    Procedure: HEMODIALYSIS CATHETER INSERTION;  Surgeon: Clint Hernández MD;  Location: [facility] MAIN OR;  Service: Vascular;  Laterality: Right;    LYMPH NODE BIOPSY      MASTECTOMY Bilateral     VENOUS ACCESS DEVICE (PORT) INSERTION AND REMOVAL       Family History:    Family History   Problem Relation Age of Onset    Heart attack Mother 72    Coronary artery disease Mother         Mother  from MI at 72    Diabetes Mother     Breast cancer Mother     Hyperlipidemia Mother     Arthritis Mother     Cancer Mother      Heart attack Father 74    Aortic aneurysm Father         Father with ruptured thoracic aortic aneurysm    Coronary artery disease Father         Father  from MI at 74    Heart disease Father     Hyperlipidemia Father     Arthritis Father     Heart attack Maternal Grandmother 76    Coronary artery disease Maternal Grandmother         MGM deceeased from MI at age 76    Malig Hyperthermia Neg Hx      Social History:    Social History     Socioeconomic History    Marital status:      Spouse name: Rk   Tobacco Use    Smoking status: Never     Passive exposure: Never    Smokeless tobacco: Never   Vaping Use    Vaping Use: Never used   Substance and Sexual Activity    Alcohol use: No    Drug use: Never    Sexual activity: Yes     Partners: Male     Birth control/protection: Post-menopausal     Allergies:   No Known Allergies    Medications:     Current Facility-Administered Medications:     Abemaciclib tablet 100 mg, 100 mg, Oral, BID, Moose Pete MD, 100 mg at 10/17/23 2202    acetaminophen (TYLENOL) tablet 650 mg, 650 mg, Oral, Q4H PRN **OR** acetaminophen (TYLENOL) 160 MG/5ML oral solution 650 mg, 650 mg, Oral, Q4H PRN **OR** acetaminophen (TYLENOL) suppository 650 mg, 650 mg, Rectal, Q4H PRN, Moose Pete MD    acetaminophen (TYLENOL) tablet 650 mg, 650 mg, Oral, Q4H PRN **OR** acetaminophen (TYLENOL) suppository 650 mg, 650 mg, Rectal, Q4H PRN, Terra Arreaga APRN    acetaminophen (TYLENOL) tablet 1,000 mg, 1,000 mg, Oral, PRN, Moose Pete MD    albumin human 25 % IV SOLN 12.5 g, 12.5 g, Intravenous, Q4H PRN, Patrick Rowley MD, Last Rate: 25 mL/hr at 10/15/23 1255, 12.5 g at 10/15/23 1255    atorvastatin (LIPITOR) tablet 40 mg, 40 mg, Oral, Nightly, Moose Pete MD, 40 mg at 10/14/23 2236    sennosides-docusate (PERICOLACE) 8.6-50 MG per tablet 2 tablet, 2 tablet, Oral, BID, 2 tablet at 10/15/23 0859 **AND** polyethylene glycol (MIRALAX) packet 17 g, 17 g, Oral, Daily PRN **AND** bisacodyl  (DULCOLAX) EC tablet 5 mg, 5 mg, Oral, Daily PRN **AND** bisacodyl (DULCOLAX) suppository 10 mg, 10 mg, Rectal, Daily PRN, Moose Pete MD    dextrose (D50W) (25 g/50 mL) IV injection 25 g, 25 g, Intravenous, Q15 Min PRN, Moose Pete MD, 25 g at 10/15/23 1539    dextrose (GLUTOSE) oral gel 15 g, 15 g, Oral, Q15 Min PRN, Moose Pete MD    diphenoxylate-atropine (LOMOTIL) 2.5-0.025 MG per tablet 1 tablet, 1 tablet, Oral, 4x Daily PRN, Moose Pete MD    Epoetin Dread-epbx (RETACRIT) injection 20,000 Units, 20,000 Units, Subcutaneous, Once per day on Mon Wed Fri, Patrick Rowley MD, 20,000 Units at 10/18/23 0807    fentaNYL citrate (PF) (SUBLIMAZE) injection 50 mcg, 50 mcg, Intravenous, Q1H PRN, Cortes Olvera MD    folic acid (FOLVITE) tablet 1 mg, 1 mg, Oral, Daily, Moose Pete MD, 1 mg at 10/17/23 1518    gabapentin (NEURONTIN) capsule 300 mg, 300 mg, Oral, Nightly, Moose Pete MD, 300 mg at 10/17/23 2201    glucagon (GLUCAGEN) injection 1 mg, 1 mg, Intramuscular, Q15 Min PRN, Moose Pete MD    HYDROcodone-acetaminophen (NORCO) 5-325 MG per tablet 1 tablet, 1 tablet, Oral, Q4H PRN, Moose Pete MD    Influenza Vac High-Dose Quad (FLUZONE HIGH DOSE) injection 0.7 mL, 0.7 mL, Intramuscular, During Hospitalization, Moose Pete MD    insulin lispro (HUMALOG/ADMELOG) injection 2-9 Units, 2-9 Units, Subcutaneous, Q4H, Cortes Olvera MD    levothyroxine (SYNTHROID, LEVOTHROID) tablet 50 mcg, 50 mcg, Oral, Q AM, Moose Pete MD, 50 mcg at 10/15/23 0858    Menthol-Zinc Oxide 1 application , 1 application , Topical, BID PRN, Zoltan San MD    metoclopramide (REGLAN) injection 10 mg, 10 mg, Intravenous, Q6H PRN, Dg Padilla MD, 10 mg at 10/15/23 1514    midodrine (PROAMATINE) tablet 10 mg, 10 mg, Oral, TID AC, Apolonia Tao MD, 10 mg at 10/18/23 0807    multivitamin (THERAGRAN) tablet 1 tablet, 1 tablet, Oral, Daily, Patrick Rowley MD, 1 tablet at 10/17/23 1518    mupirocin (BACTROBAN) 2 %  ointment 1 application , 1 application , Topical, Daily, Moose Pete MD, 1 application  at 10/18/23 0807    nitroglycerin (NITROSTAT) SL tablet 0.4 mg, 0.4 mg, Sublingual, Q5 Min PRN, Terra Arreaga, APRN    ondansetron (ZOFRAN) tablet 8 mg, 8 mg, Oral, Q6H PRN **OR** ondansetron (ZOFRAN) injection 8 mg, 8 mg, Intravenous, Q6H PRN, Dg Padilla MD, 8 mg at 10/18/23 1119    polyethylene glycol (MIRALAX) packet 17 g, 17 g, Oral, Daily, Zoltan San MD, 17 g at 10/17/23 1519    potassium chloride (K-DUR,KLOR-CON) ER tablet 20 mEq, 20 mEq, Oral, Daily, Moose Pete MD, 20 mEq at 10/17/23 1519    potassium chloride (KLOR-CON) packet 20 mEq, 20 mEq, Oral, Daily, Moose Pete MD, 20 mEq at 10/13/23 1737    sertraline (ZOLOFT) tablet 150 mg, 150 mg, Oral, Daily, Moose Pete MD, 150 mg at 10/17/23 1518    [COMPLETED] Insert Peripheral IV, , , Once **AND** sodium chloride 0.9 % flush 10 mL, 10 mL, Intravenous, PRN, Moose Pete MD    sodium chloride 0.9 % flush 10 mL, 10 mL, Intravenous, Q12H, Moose Pete MD, 10 mL at 10/18/23 0808    sodium chloride 0.9 % flush 10 mL, 10 mL, Intravenous, PRN, Moose Pete MD    sodium chloride 0.9 % infusion 250 mL, 250 mL, Intravenous, PRN, Leodan Irvin Jr., MD    sodium chloride 0.9 % infusion 40 mL, 40 mL, Intravenous, PRN, Moose Pete MD    vitamin B-12 (CYANOCOBALAMIN) tablet 1,000 mcg, 1,000 mcg, Oral, Daily, Moose Pete MD, 1,000 mcg at 10/17/23 1518    Radiology/Endoscopy:    9/28/2023 CT abdomen pelvis reviewed: Large amount of ascites, no evidence of bowel obstruction  KUB yesterday reviewed: Gas and stool in the rectum with possible stool impaction    Labs:    Blood cell count 6 hemoglobin 8.4 platelets 116 creatinine 7.4    Review of Systems:   Influenza-like illness: no fever, no  cough, no  sore throat, no  body aches, no loss of sense of taste or smell, no known exposure to person with Covid-19.  Constitutional: Negative for fevers or  chills  HENT: Negative for hearing loss or runny nose  Eyes: Negative for vision changes or scleral icterus  Respiratory: Negative for cough or shortness of breath  Cardiovascular: Negative for chest pain or heart palpitations  Gastrointestinal: + for abdominal pain, nausea, vomiting, denies constipation, melena, or hematochezia  Genitourinary: Negative for hematuria or dysuria  Musculoskeletal: Negative for joint swelling or gait instability  Neurologic: Negative for tremors or seizures  Psychiatric: Negative for suicidal ideations or depression  All other systems reviewed and negative    Physical Exam:   Constitutional: Well-developed well-nourished, no acute distress, BMI 57  Eyes:  Conjunctivae normal, sclerae nonicteric  ENMT: Hearing grossly normal, oral mucosa moist, NG bilious  Neck: Supple, trachea midline  Respiratory: Clear to auscultation, normal inspiratory effort  Cardiovascular: Regular rate, no peripheral edema, no jugular venous distention  Gastrointestinal: Soft, nontender, nondistended  Skin:  Warm, dry, no rash on visualized skin surfaces  Musculoskeletal: Symmetric strength, normal gait  Psychiatric: Alert and oriented ×3, normal affect     FERNANDA LEWIS M.D.  General and Endoscopic Surgery  [facility]    [address], [address]  [place], [place], [address]  P: [phone]  F: [phone]

## 2023-10-18 NOTE — PROGRESS NOTES
REASON FOR FOLLOW-UP: Metastatic breast cancer    Interval history:  Blood pressure improved to the point patient was able to tolerate hemodialysis today.  She still has nausea/vomiting when NG clamped and put back to suction with bilious drainage.  She had a bowel movement and denies abdominal pain per se.      HISTORY OF PRESENT ILLNESS:   This is a 63-year-old woman followed by Dr. Irvin in our practice for metastatic lobular breast cancer which is ER/CO positive on treatment with Faslodex 500 mg IM every 4 weeks and abemaciclib 100 mg twice daily as of her most recent office visit 9/6/2023.  The patient also has anemia of end-stage renal disease/malignancy/treatment related.  Comorbidities include end-stage renal disease on hemodialysis, CHF/aortic stenosis.     The patient was recently admitted for intractable nausea vomiting diarrhea noted to have significant ascites for which she underwent a paracentesis.  Stool was positive for C. difficile toxin but negative for C. difficile antigen consistent with carrier state.  After her paracentesis of 11 L she became progressively weak and dizzy.  She called EMS and was brought to the ER found to be significantly hypotensive treated with fluid boluses but remained hypotensive requiring transfer to the ICU.  Patient also diagnosed with urinary tract infection and ileus (she was using a lot of Imodium at home for diarrhea) requiring NG tube.  She is currently on Rocephin      Past Medical History, Past Surgical History, Social History, Family History have been reviewed and are without significant changes except as mentioned.    Review of Systems   Constitutional:  Positive for activity change and fatigue.   Respiratory:  Negative for shortness of breath.    Gastrointestinal:  Positive for nausea and vomiting. Negative for abdominal pain.   Genitourinary:  Positive for difficulty urinating.   Musculoskeletal:  Positive for gait problem.   Neurological:  Positive for  weakness. Negative for light-headedness.   Hematological:  Does not bruise/bleed easily.        Medications:  The current medication list was reviewed in the EMR    ALLERGIES:  No Known Allergies    Objective      Vitals:    10/18/23 0742   BP: 103/59   Pulse: 87   Resp: 18   Temp: 97.3 °F (36.3 °C)   SpO2: 92%          Physical Exam    CONSTITUTIONAL: pleasant well-developed/obese adult woman in no acute distress seen on dialysis  HEENT: no icterus, no thrush, moist membranes, NG tube in place with bilious/dark drainage  LYMPH: no cervical or supraclavicular lad  CV: RRR, S1S2, no murmur  RESP: cta bilat, no wheezing, no rales  GI: soft, non-tender, no splenomegaly, +bs, obese  MUSC: 1+ dependent edema  NEURO: alert and oriented x3, mild global weakness  PSYCH: normal mood and affect    RECENT LABS:  Hematology Results from last 7 days   Lab Units 10/18/23  0416 10/17/23  0411 10/16/23  0427   WBC 10*3/mm3 6.41 6.68 6.57   HEMOGLOBIN g/dL 8.4* 8.7* 9.4*   HEMATOCRIT % 26.5* 26.4* 28.5*   PLATELETS 10*3/mm3 116* 103* 108*     2  Lab Results   Component Value Date    GLUCOSE 143 (H) 10/18/2023    BUN 44 (H) 10/18/2023    CREATININE 7.41 (H) 10/18/2023    EGFRIFNONA 11 (L) 02/16/2022    EGFRIFAFRI  01/29/2022      Comment:      <15 Indicative of kidney failure.    BCR 5.9 (L) 10/18/2023    CO2 25.8 10/18/2023    CALCIUM 7.5 (L) 10/18/2023    PROTENTOTREF 7.0 03/16/2019    ALBUMIN 2.0 (L) 10/14/2023    LABIL2 1.2 03/16/2019    AST 14 10/14/2023    ALT 13 10/14/2023       Lab Results   Component Value Date    IRON 46 10/15/2023    TIBC 100 (L) 10/15/2023    FERRITIN 943.00 (H) 10/15/2023       Lab Results   Component Value Date    AOGDSIXP91 1,146 (H) 03/16/2023       Lab Results   Component Value Date    FOLATE >20.00 03/16/2023          Assessment & Plan   *Stage IV hormone receptor positive lobular breast cancer  7/3/2023-PET showed metastatic disease to bone, hypermetabolic ascites with soft tissue density anterior  pelvis (possible peritoneal carcinomatosis), soft tissue thickening left renal pelvis  Paracentesis 10/9/2023-cytology showed rare atypical cells suspicious for lobular carcinoma but ER/CO negative?  Patient currently on hormonal therapy with monthly Faslodex combined with abemaciclib 100 mg twice daily  Restaging PET scheduled for 10/23/2023 and to see Dr. Irvin 10/27/2023     *Hypotension acute on chronic  Patient developed hypotension after large volume paracentesis (11 L) combined with end-stage renal disease, UTI, AS  Patient now off vasopressors and on midodrine 10 mg 3 times daily but blood pressure is soft 88/54  On Rocephin for UTI     *End-stage renal disease on hemodialysis     *Aortic valve stenosis unamenable to intervention per cardiology     *Chronic anemia, multifactorial  Hemoglobin stable 8.4, ferritin 943, iron sat 46%  She receives scheduled Retacrit 3 times per week     *Ileus with NG tube  Hopefully secondary to overuse of Imodium and not related to carcinomatous/mechanical  Symptomatically improving with NG decompression-KUB 10/15 showed improved gaseous distention of the stomach  Persistent nausea/vomiting when NG clamped     *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 Irvin scheduled before end of the month  Monitor CBC/labs  If ileus does not resolve with conservative measures, she may need repeat CT abdomen to rule out mechanical obstruction given probable carcinomatosis-General surgery consulted  Check CA 15-3-stable/normal 21.3                     10/18/2023      CC:

## 2023-10-18 NOTE — PROGRESS NOTES
[facility]     Progress Note    Patient Name: Juana Hall  : 1960  MRN: [id number]  Primary Care Physician:  Kiana Noriega), BETY  Date of admission: 10/13/2023    Subjective   Subjective     Chief Complaint: ESRD    History of Present Illness  Patient with ESRD on home hd. Admitted with hypotension after paracentesis      Review of Systems  HD held yesterday due to hypotension   She underwent HD today with lower BFR and lower temperature. Was given albumin prior to HD and UF down to 1 liter.   Only 650 UF. Still hypotensive and with nausea.   Objective   Objective     Vitals:   Temp:  [97.3 °F (36.3 °C)-97.9 °F (36.6 °C)] 97.3 °F (36.3 °C)  Heart Rate:  [] 106  Resp:  [18] 18  BP: (100-106)/(50-59) 100/56    Physical Exam   General Appearance:  In no acute distress.  Obese, chronically ill   HEENT: NG tube in place  Extremities: .  There is no deformity, effusion or dependent edema.    Neurological: Patient is alert   LUE AVF +thrill and bruit. Bruised.     Result Review    Result Review:  I have personally reviewed the results from the time of this admission to 10/18/2023 16:48 EDT and agree with these findings:  [x]  Laboratory list / accordion  []  Microbiology  []  Radiology  []  EKG/Telemetry   []  Cardiology/Vascular   []  Pathology  []  Old records  []  Other:  Most notable findings include:       Assessment & Plan   Assessment / Plan     Brief Patient Summary:  Juana Hall is a 63 y.o. female who has ESRD on home hd    Active Hospital Problems:  Active Hospital Problems    Diagnosis     **Hypotension     Abnormal urinalysis     Hypokalemia     Diastolic CHF, chronic     ANDREW (obstructive sleep apnea)     Combined systolic and diastolic congestive heart failure     ESRD (end stage renal disease)     Morbid obesity with BMI of 50.0-59.9, adult     Anemia in stage 3 chronic kidney disease     Primary malignant neoplasm of breast with metastasis     Acquired hypothyroidism      Diabetes type 2, controlled      Plan:   Hypotension    Diabetes type 2, controlled    Acquired hypothyroidism    Primary malignant neoplasm of breast with metastasis    Anemia in stage 3 chronic kidney disease    Morbid obesity with BMI of 50.0-59.9, adult    ESRD (end stage renal disease)    Combined systolic and diastolic congestive heart failure    ANDREW (obstructive sleep apnea)    Diastolic CHF, chronic    Abnormal urinalysis    Hypokalemia    Will plan for TTS HD now.   Might need to run even, she is having a lot of GI output.   Continue midodrine.   Will follow     Carmen Brown MD  [facility]

## 2023-10-18 NOTE — NURSING NOTE
Hd completed. 650 cc net removal of 1 liter goal. Albumin 25 grams iv given with minimal benefit. N/v therefore placed on ngt sxn. 400 cc of dark tea colored thin liquid in cannister. Sbp drop in 7s towards end of tx. Uf off and pt responded w/ sbp rebound into 90s. Post vss 95/47 hr 96

## 2023-10-18 NOTE — CONSULTS
Visit with patient and spouse after upsetting encounter. Patient and spouse's son  here in the hospital three years ago and they are grieving. Being here in the hospital triggers a lot of feelings. Prayer offered for comfort and peace.

## 2023-10-18 NOTE — PROGRESS NOTES
"  PROGRESS NOTE  Patient Name: Juana Hall  Age/Sex: 63 y.o. female  : 1960  MRN: [id number]    Date of Admission: 10/13/2023  Date of Encounter Visit: 10/18/23   LOS: 4 days   Patient Care Team:  Kiana Noriega APRN ([place]) as PCP - General (Family Medicine)  Pasha Harkins MD as Referring Physician (Cardiology)  Leodan Irvin Jr., MD as Consulting Physician (Hematology and Oncology)  Tai Antonio MD as Consulting Physician (Nephrology)  Luis Rivers MD as Consulting Physician (Cardiology)  Leodan Mckee Jr., MD as Consulting Physician (Urology)    Chief Complaint:Hypertension with multisystem organ failure    Hospital course: Patient is doing better, on RA, no respiratory complaints          REVIEW OF SYSTEMS:    No fever    Ventilator/Non-Invasive Ventilation Settings (From admission, onward)      None              Vital Signs  Temp:  [97.3 °F (36.3 °C)-97.9 °F (36.6 °C)] 97.3 °F (36.3 °C)  Heart Rate:  [] 106  Resp:  [18] 18  BP: (100-106)/(50-59) 100/56  SpO2:  [92 %-98 %] 96 %  on    Device (Oxygen Therapy): room air    Intake/Output Summary (Last 24 hours) at 10/18/2023 1454  Last data filed at 10/18/2023 1300  Gross per 24 hour   Intake 390 ml   Output 2455 ml   Net -2065 ml     Flowsheet Rows      Flowsheet Row First Filed Value   Admission Height 170.2 cm (67\") Documented at 10/13/2023 1251   Admission Weight 154 kg (340 lb) Documented at 10/13/2023 1251          Body mass index is 57.17 kg/m².      10/14/23  1634 10/17/23  0537 10/18/23  0510   Weight: (!) 176 kg (387 lb) (!) 168 kg (371 lb 1.6 oz) (!) 166 kg (365 lb)       Physical Exam:  GEN:  No acute distress, alert, cooperative, well developed, chronically ill looking     LUNGS: Normal chest on inspection, managed otherwise clear. Respirations regular, even and unlabored.   CV:  Regular rhythm and rate. Normal S1/S2.  High-pitched systolic murmur consistent with severe aortic stenosis, No gallops, or " "rubs noted.       Results Review:    Results From Last 14 Days   Lab Units 10/15/23  0412 10/14/23  0910 10/14/23  0129 10/13/23  2138   FERRITIN ng/mL 943.00*  --   --   --    LACTATE mmol/L  --  1.6 2.7* 2.8*     Results from last 7 days   Lab Units 10/18/23  0416 10/17/23  0411 10/16/23  0427 10/15/23  0412 10/14/23  0129 10/13/23  1108   SODIUM mmol/L 139 140 139 139 139 136   POTASSIUM mmol/L 3.6 4.0 3.3* 3.0* 2.8* 2.9*   CHLORIDE mmol/L 99 101 101 99 99 96*   CO2 mmol/L 25.8 23.7 25.2 27.0 27.0 26.4   BUN mg/dL 44* 30* 23 31* 24* 19   CREATININE mg/dL 7.41* 6.06* 4.96* 6.60* 5.23* 4.84*   CALCIUM mg/dL 7.5* 8.0* 8.0* 8.3* 8.7 8.4*   AST (SGOT) U/L  --   --   --   --  14 15   ALT (SGPT) U/L  --   --   --   --  13 14   ANION GAP mmol/L 14.2 15.3* 12.8 13.0 13.0 13.6   ALBUMIN g/dL  --   --   --   --  2.0* 1.9*     Results from last 7 days   Lab Units 10/13/23  1259 10/13/23  1108   HSTROP T ng/L 51* 51*     Results from last 7 days   Lab Units 10/13/23  1108   TSH uIU/mL 2.810     Results from last 7 days   Lab Units 10/13/23  1108   PROBNP pg/mL 21,914.0*     Results from last 7 days   Lab Units 10/18/23  0416 10/17/23  0411 10/16/23  0427 10/15/23  1043 10/14/23  0129 10/13/23  1108   WBC 10*3/mm3 6.41 6.68 6.57 4.79 3.42 5.84   HEMOGLOBIN g/dL 8.4* 8.7* 9.4* 8.2* 7.5* 8.7*   HEMATOCRIT % 26.5* 26.4* 28.5* 25.7* 23.5* 26.2*   PLATELETS 10*3/mm3 116* 103* 108* 92* 105* 123*   MCV fL 97.1* 96.0 95.6 97.0 97.9* 95.3   NEUTROPHIL % % 77.5* 71.9 72.3 81.0* 77.4* 81.8*   LYMPHOCYTE % % 8.9* 13.3* 11.9* 9.2* 12.3* 9.1*   MONOCYTES % % 9.8 9.7 13.1* 7.5 6.7 6.8   EOSINOPHIL % % 0.6 1.0 1.2 0.4 1.5 0.3   BASOPHIL % % 0.2 0.4 0.3 0.4 0.9 0.5   IMM GRAN % % 3.0*  --   --  1.5* 1.2* 1.5*     Results from last 7 days   Lab Units 10/13/23  1108   INR  1.36*     Results from last 7 days   Lab Units 10/13/23  1108   MAGNESIUM mg/dL 1.7           Invalid input(s): \"LDLCALC\"      Results from last 7 days   Lab Units " 10/14/23  0129   HEMOGLOBIN A1C % 6.00*     Glucose   Date/Time Value Ref Range Status   10/18/2023 0754 131 (H) 70 - 130 mg/dL Final   10/17/2023 2050 137 (H) 70 - 130 mg/dL Final   10/17/2023 1812 129 70 - 130 mg/dL Final   10/17/2023 1140 127 70 - 130 mg/dL Final   10/17/2023 0754 133 (H) 70 - 130 mg/dL Final   10/17/2023 0536 132 (H) 70 - 130 mg/dL Final   10/17/2023 0022 129 70 - 130 mg/dL Final   10/16/2023 2011 117 70 - 130 mg/dL Final     Results from last 7 days   Lab Units 10/14/23  0910 10/14/23  0129 10/13/23  2138 10/13/23  1853 10/13/23  1431 10/13/23  1259   PROCALCITONIN ng/mL  --   --   --   --   --  3.83*   LACTATE mmol/L 1.6 2.7* 2.8* 3.5* 3.4*  --      Results from last 7 days   Lab Units 10/14/23  0143 10/13/23  2251 10/13/23  1210   BLOODCX  No growth at 4 days No growth at 4 days  --    URINECX   --   --  >100,000 CFU/mL Escherichia coli*     Results from last 7 days   Lab Units 10/13/23  1210   NITRITE UA  Positive*   WBC UA /HPF Too Numerous to Count*   BACTERIA UA /HPF Unable to determine due to loaded field*   SQUAM EPITHEL UA /HPF Unable to determine due to loaded field*   URINECX  >100,000 CFU/mL Escherichia coli*     Results from last 7 days   Lab Units 10/13/23  1056   COVID19  Not Detected               Imaging:   Imaging Results (All)       Procedure Component Value Units Date/Time            I reviewed the patient's new clinical results.  I personally viewed and interpreted the patient's imaging results:        Medication Review:   Abemaciclib, 100 mg, Oral, BID  atorvastatin, 40 mg, Oral, Nightly  epoetin jaan/jana-epbx, 20,000 Units, Subcutaneous, Once per day on Mon Wed Fri  folic acid, 1 mg, Oral, Daily  gabapentin, 300 mg, Oral, Nightly  insulin lispro, 2-9 Units, Subcutaneous, Q4H  levothyroxine, 50 mcg, Oral, Q AM  midodrine, 10 mg, Oral, TID AC  multivitamin, 1 tablet, Oral, Daily  mupirocin, 1 application , Topical, Daily  polyethylene glycol, 17 g, Oral, Daily  potassium  chloride, 20 mEq, Oral, Daily  potassium chloride, 20 mEq, Oral, Daily  senna-docusate sodium, 2 tablet, Oral, BID  sertraline, 150 mg, Oral, Daily  sodium chloride, 10 mL, Intravenous, Q12H  vitamin B-12, 1,000 mcg, Oral, Daily             ASSESSMENT:   Hypotension  Urinary tract infection  End-stage renal disease on hemodialysis 5 times weekly  Hypokalemia  Chronic diastolic and systolic heart failure  Breast cancer with radical mastectomy and chemotherapy  Hypothyroidism  Diabetes mellitus  Obstructive sleep apnea on CPAP  Multiple falls  Fractures in left foot/ankle  Morbid obesity (BMI 53.25)  Hypoglycemia  Severe aortic valve stenosis  Ileus status post NG tube placement 10/16    PLAN:  Compensating well   Discussed with patient  Will sign off     Patient is new to me, labs or films were reviewed  Labs/Notes/films were independently reviewed and pertinent results are summarized above  The copied texts in this note were reviewed and they are accurate as of 10/18/23    Disposition: Per primary team    Ct Tellez MD  10/18/23  14:54 EDT           Dictated utilizing Dragon dictation

## 2023-10-19 ENCOUNTER — APPOINTMENT (OUTPATIENT)
Dept: CT IMAGING | Facility: HOSPITAL | Age: 63
End: 2023-10-19
Payer: MEDICARE

## 2023-10-19 LAB
ANION GAP SERPL CALCULATED.3IONS-SCNC: 15 MMOL/L (ref 5–15)
BACTERIA SPEC AEROBE CULT: NORMAL
BASOPHILS # BLD AUTO: 0.03 10*3/MM3 (ref 0–0.2)
BASOPHILS NFR BLD AUTO: 0.3 % (ref 0–1.5)
BUN SERPL-MCNC: 27 MG/DL (ref 8–23)
BUN/CREAT SERPL: 5.5 (ref 7–25)
CALCIUM SPEC-SCNC: 8.5 MG/DL (ref 8.6–10.5)
CHLORIDE SERPL-SCNC: 100 MMOL/L (ref 98–107)
CO2 SERPL-SCNC: 25 MMOL/L (ref 22–29)
CREAT SERPL-MCNC: 4.94 MG/DL (ref 0.57–1)
DEPRECATED RDW RBC AUTO: 55.2 FL (ref 37–54)
EGFRCR SERPLBLD CKD-EPI 2021: 9.2 ML/MIN/1.73
EOSINOPHIL # BLD AUTO: 0.07 10*3/MM3 (ref 0–0.4)
EOSINOPHIL NFR BLD AUTO: 0.8 % (ref 0.3–6.2)
ERYTHROCYTE [DISTWIDTH] IN BLOOD BY AUTOMATED COUNT: 15.6 % (ref 12.3–15.4)
GLUCOSE BLDC GLUCOMTR-MCNC: 110 MG/DL (ref 70–130)
GLUCOSE BLDC GLUCOMTR-MCNC: 111 MG/DL (ref 70–130)
GLUCOSE BLDC GLUCOMTR-MCNC: 124 MG/DL (ref 70–130)
GLUCOSE BLDC GLUCOMTR-MCNC: 144 MG/DL (ref 70–130)
GLUCOSE BLDC GLUCOMTR-MCNC: 149 MG/DL (ref 70–130)
GLUCOSE BLDC GLUCOMTR-MCNC: 150 MG/DL (ref 70–130)
GLUCOSE SERPL-MCNC: 124 MG/DL (ref 65–99)
HCT VFR BLD AUTO: 28.7 % (ref 34–46.6)
HGB BLD-MCNC: 9.2 G/DL (ref 12–15.9)
IMM GRANULOCYTES # BLD AUTO: 0.41 10*3/MM3 (ref 0–0.05)
IMM GRANULOCYTES NFR BLD AUTO: 4.6 % (ref 0–0.5)
LYMPHOCYTES # BLD AUTO: 0.96 10*3/MM3 (ref 0.7–3.1)
LYMPHOCYTES NFR BLD AUTO: 10.8 % (ref 19.6–45.3)
MCH RBC QN AUTO: 31.4 PG (ref 26.6–33)
MCHC RBC AUTO-ENTMCNC: 32.1 G/DL (ref 31.5–35.7)
MCV RBC AUTO: 98 FL (ref 79–97)
MONOCYTES # BLD AUTO: 1.12 10*3/MM3 (ref 0.1–0.9)
MONOCYTES NFR BLD AUTO: 12.6 % (ref 5–12)
NEUTROPHILS NFR BLD AUTO: 6.33 10*3/MM3 (ref 1.7–7)
NEUTROPHILS NFR BLD AUTO: 70.9 % (ref 42.7–76)
NRBC BLD AUTO-RTO: 0.3 /100 WBC (ref 0–0.2)
PLATELET # BLD AUTO: 157 10*3/MM3 (ref 140–450)
PMV BLD AUTO: 11.1 FL (ref 6–12)
POTASSIUM SERPL-SCNC: 3.8 MMOL/L (ref 3.5–5.2)
RBC # BLD AUTO: 2.93 10*6/MM3 (ref 3.77–5.28)
SODIUM SERPL-SCNC: 140 MMOL/L (ref 136–145)
WBC NRBC COR # BLD: 8.92 10*3/MM3 (ref 3.4–10.8)

## 2023-10-19 PROCEDURE — 74177 CT ABD & PELVIS W/CONTRAST: CPT

## 2023-10-19 PROCEDURE — 82948 REAGENT STRIP/BLOOD GLUCOSE: CPT

## 2023-10-19 PROCEDURE — 0 DIATRIZOATE MEGLUMINE & SODIUM PER 1 ML: Performed by: INTERNAL MEDICINE

## 2023-10-19 PROCEDURE — 99232 SBSQ HOSP IP/OBS MODERATE 35: CPT | Performed by: NURSE PRACTITIONER

## 2023-10-19 PROCEDURE — 85025 COMPLETE CBC W/AUTO DIFF WBC: CPT | Performed by: HOSPITALIST

## 2023-10-19 PROCEDURE — 25510000001 IOPAMIDOL 61 % SOLUTION: Performed by: INTERNAL MEDICINE

## 2023-10-19 PROCEDURE — 80048 BASIC METABOLIC PNL TOTAL CA: CPT | Performed by: INTERNAL MEDICINE

## 2023-10-19 PROCEDURE — 25010000002 ONDANSETRON PER 1 MG: Performed by: HOSPITALIST

## 2023-10-19 PROCEDURE — 99232 SBSQ HOSP IP/OBS MODERATE 35: CPT | Performed by: SURGERY

## 2023-10-19 RX ORDER — MIDODRINE HYDROCHLORIDE 5 MG/1
5 TABLET ORAL
Status: DISCONTINUED | OUTPATIENT
Start: 2023-10-19 | End: 2023-10-25

## 2023-10-19 RX ADMIN — MIDODRINE HYDROCHLORIDE 10 MG: 5 TABLET ORAL at 08:40

## 2023-10-19 RX ADMIN — ONDANSETRON 8 MG: 2 INJECTION INTRAMUSCULAR; INTRAVENOUS at 10:59

## 2023-10-19 RX ADMIN — Medication 10 ML: at 21:58

## 2023-10-19 RX ADMIN — SERTRALINE 150 MG: 100 TABLET, FILM COATED ORAL at 08:40

## 2023-10-19 RX ADMIN — Medication 1000 MCG: at 08:40

## 2023-10-19 RX ADMIN — ATORVASTATIN CALCIUM 40 MG: 20 TABLET, FILM COATED ORAL at 21:56

## 2023-10-19 RX ADMIN — POTASSIUM CHLORIDE 20 MEQ: 750 TABLET, EXTENDED RELEASE ORAL at 08:40

## 2023-10-19 RX ADMIN — GABAPENTIN 300 MG: 300 CAPSULE ORAL at 21:56

## 2023-10-19 RX ADMIN — DIATRIZOATE MEGLUMINE AND DIATRIZOATE SODIUM 30 ML: 660; 100 LIQUID ORAL; RECTAL at 11:35

## 2023-10-19 RX ADMIN — Medication 10 ML: at 08:41

## 2023-10-19 RX ADMIN — Medication 1 TABLET: at 08:40

## 2023-10-19 RX ADMIN — IOPAMIDOL 85 ML: 612 INJECTION, SOLUTION INTRAVENOUS at 14:31

## 2023-10-19 RX ADMIN — MIDODRINE HYDROCHLORIDE 5 MG: 5 TABLET ORAL at 17:35

## 2023-10-19 RX ADMIN — FOLIC ACID 1 MG: 1 TABLET ORAL at 08:40

## 2023-10-19 RX ADMIN — MUPIROCIN 1 APPLICATION: 20 OINTMENT TOPICAL at 08:47

## 2023-10-19 NOTE — PROGRESS NOTES
HOSPITAL FOLLOW UP NOTE    Patient Name: Juana Hall  Patient : 1960        Date of Service:10/19/23  Provider of Service: BETY Harmon  Place of Service: [facility]  Referral Provider: Moose Pete MD          Follow Up: severe degenerative aortic stenosis    Interval Hx: paroxysmal afib on tele, SR at this time, BP soft, NGT with output, weak    OBJECTIVE  Temp:  [97.3 °F (36.3 °C)-98.4 °F (36.9 °C)] 98.4 °F (36.9 °C)  Heart Rate:  [] 90  Resp:  [18] 18  BP: ()/(39-59) 104/57     Intake/Output Summary (Last 24 hours) at 10/19/2023 0729  Last data filed at 10/18/2023 1300  Gross per 24 hour   Intake --   Output 1150 ml   Net -1150 ml     Body mass index is 55.87 kg/m².      10/17/23  0537 10/18/23  0510 10/19/23  0500   Weight: (!) 168 kg (371 lb 1.6 oz) (!) 166 kg (365 lb) (!) 162 kg (356 lb 11.2 oz)         Physical Exam:     Gen: weak, NAD  Neuro: AAOx4  CVS: RRR, + systolic murmur  Resp: diminished mariel bases  GI/: NGT to LWS with green thin outpt, voids, HD  Extr: gen edema, PPP    CURRENT MEDS    Scheduled Meds:Abemaciclib, 100 mg, Oral, BID  atorvastatin, 40 mg, Oral, Nightly  epoetin jana/jana-epbx, 20,000 Units, Subcutaneous, Once per day on   folic acid, 1 mg, Oral, Daily  gabapentin, 300 mg, Oral, Nightly  insulin lispro, 2-9 Units, Subcutaneous, Q4H  levothyroxine, 50 mcg, Oral, Q AM  midodrine, 10 mg, Oral, TID AC  multivitamin, 1 tablet, Oral, Daily  mupirocin, 1 application , Topical, Daily  polyethylene glycol, 17 g, Oral, Daily  potassium chloride, 20 mEq, Oral, Daily  potassium chloride, 20 mEq, Oral, Daily  senna-docusate sodium, 2 tablet, Oral, BID  sertraline, 150 mg, Oral, Daily  sodium chloride, 10 mL, Intravenous, Q12H  vitamin B-12, 1,000 mcg, Oral, Daily      Continuous Infusions:       Lab Review:   Results from last 7 days   Lab Units 10/19/23  0413 10/18/23  0416 10/15/23  0412 10/14/23  0129 10/13/23  1108   SODIUM  mmol/L 140 139   < > 139 136   POTASSIUM mmol/L 3.8 3.6   < > 2.8* 2.9*   CHLORIDE mmol/L 100 99   < > 99 96*   CO2 mmol/L 25.0 25.8   < > 27.0 26.4   BUN mg/dL 27* 44*   < > 24* 19   CREATININE mg/dL 4.94* 7.41*   < > 5.23* 4.84*   GLUCOSE mg/dL 124* 143*   < > 101* 156*   CALCIUM mg/dL 8.5* 7.5*   < > 8.7 8.4*   AST (SGOT) U/L  --   --   --  14 15   ALT (SGPT) U/L  --   --   --  13 14    < > = values in this interval not displayed.         Results from last 7 days   Lab Units 10/19/23  0413 10/18/23  0416   WBC 10*3/mm3 8.92 6.41   HEMOGLOBIN g/dL 9.2* 8.4*   HEMATOCRIT % 28.7* 26.5*   PLATELETS 10*3/mm3 157 116*     Results from last 7 days   Lab Units 10/13/23  1108   INR  1.36*     Results from last 7 days   Lab Units 10/13/23  1108   MAGNESIUM mg/dL 1.7         Results from last 7 days   Lab Units 10/13/23  1108   PROBNP pg/mL 21,914.0*     Results from last 7 days   Lab Units 10/13/23  1108   TSH uIU/mL 2.810     2D Echocardiogram 10/14/2023:    Left ventricular systolic function is normal. Calculated left ventricular EF = 58.4%    Left ventricular diastolic function was normal.    Severe aortic valve stenosis is present. Aortic valve area is 0.9 cm2.    Peak velocity of the flow distal to the aortic valve is 469.1 cm/s. Aortic valve maximum pressure gradient is 88 mmHg. Aortic valve mean pressure gradient is 48 mmHg. Aortic valve dimensionless index is 0.3 .    Estimated right ventricular systolic pressure from tricuspid regurgitation is moderately elevated (45-55 mmHg). Calculated right ventricular systolic pressure from tricuspid regurgitation is 46 mmHg.    There is mild plaque in the aortic arch present.    ASSESSMENT & PLAN    Hypotension    Diabetes type 2, controlled    Acquired hypothyroidism    Primary malignant neoplasm of breast with metastasis    Anemia in stage 3 chronic kidney disease    Morbid obesity with BMI of 50.0-59.9, adult    ESRD (end stage renal disease)    Combined systolic and  diastolic congestive heart failure    ANDREW (obstructive sleep apnea)    Diastolic CHF, chronic    Abnormal urinalysis    Hypokalemia    1.  Hypotension/shock: BP soft.  On midodrine.   2.  Urinary tract infection  3.  End-stage renal disease: On hemodialysis  4.  Metastatic breast cancer with radical mastectomy and chemotherapy  5.  Severe aortic stenosis: Seen by structural heart team and not a candidate for valve intervention  6.  Chronic diastolic heart failure: LVEF 58%, severe AS  7.  Ascites: Status post large-volume paracentesis (11 L removed 10/09/23)  8.  Anemia  9. Ileus s/p NGT with quite a bit of outpt  10: Paroxysmal atrial fibrillation: in SR at this time, no ac 2/2 anemia, rate controlled without medication at this time, BP soft therefore no beta blockade      Sherine Badillo, APRN  10/19/23

## 2023-10-19 NOTE — PLAN OF CARE
Goal Outcome Evaluation:              Outcome Evaluation: (P) pt. AOx4 this shift. pt. NPO. pt. recieved CT of abdomen and pelvis this shift. Contrast given, dialysis to be completed tomorrow. NG hooked to intermittent low wall suction. VSS. Safety maintained.

## 2023-10-19 NOTE — PROGRESS NOTES
IMPRESSION & PLAN:  Ms. Rivera is a 63-year-old lady with metastatic breast cancer, likely peritoneal carcinomatosis with malignant ascites, and ileus versus small bowel obstruction.  NG tube with bilious output but she is started having some bowel dysfunction.  Will follow-up results of cross-sectional imaging to determine if safe to remove NG tube.  Recommend only sips of liquid for comfort while NG tube in place.  Discussed plan with her nurse, Bina Nelson RN.    CC:    Chief Complaint   Patient presents with    Weakness - Generalized         HPI: She reports she is started passing some flatus and has had a couple bowel movements.  NG tube output remains bilious but she is also having clear liquids at the time of my evaluation.    ROS:   Constitutional: No fever, no chills  CV:  No chest pain. No palpitations.   Lungs:  No shortness of breath or cough.   GI:   No nausea, no vomiting, no diarrhea, no constipation, no anorexia.       PE:    VS:   Vitals:    10/19/23 0712   BP: 104/57   Pulse: 90   Resp: 18   Temp: 98.4 °F (36.9 °C)   SpO2: 96%      CONST: Awake, alert  LUNGS: symmetric excursion, normal inspiratory effort  CV: regular rate, well perfused  Abdomen: Soft, obese, nontender    LABS:  CBC          10/17/2023    04:11 10/18/2023    04:16 10/19/2023    04:13   CBC   WBC 6.68  6.41  8.92    RBC 2.75  2.73  2.93    Hemoglobin 8.7  8.4  9.2    Hematocrit 26.4  26.5  28.7    MCV 96.0  97.1  98.0    MCH 31.6  30.8  31.4    MCHC 33.0  31.7  32.1    RDW 15.6  15.5  15.6    Platelets 103  116  157      BMP          10/17/2023    04:11 10/18/2023    04:16 10/19/2023    04:13   BMP   BUN 30  44  27    Creatinine 6.06  7.41  4.94    Sodium 140  139  140    Potassium 4.0  3.6  3.8    Chloride 101  99  100    CO2 23.7  25.8  25.0    Calcium 8.0  7.5  8.5          RADIOLOGY:  KUB reviewed with mildly dilated small bowel but overall difficult to interpret study secondary to multiple composite images necessary to  capture entire abdomen.

## 2023-10-19 NOTE — DISCHARGE PLACEMENT REQUEST
"Azael Alexander (63 y.o. Female)       Date of Birth   1960    Social Security Number       Address   [address] [place] [address]    Home Phone   [phone]    MRN   [id number]       Yarsani   Patient Refused    Marital Status                               Admission Date   10/13/23    Admission Type   Emergency    Admitting Provider   Moose Pete MD    Attending Provider   Zoltan San MD    Department, Room/Bed   [address], [address]       Discharge Date       Discharge Disposition       Discharge Destination                                 Attending Provider: Zoltan San MD    Allergies: No Known Allergies    Isolation: None   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)   Code Status: CPR    Ht: 170.2 cm (67\")   Wt: 162 kg (356 lb 11.2 oz)    Admission Cmt: None   Principal Problem: Hypotension [I95.9]                   Active Insurance as of 10/13/2023       Primary Coverage       Payor Plan Insurance Group Employer/Plan Group    [facility] MEDICARE REPLACEMENT [facility] MEDICARE REPLACEMENT [id number]       Payor Plan Address Payor Plan Phone Number Payor Plan Fax Number Effective Dates    [address] [address]   10/1/2023 - None Entered    [facility] [id number]         Subscriber Name Subscriber Birth Date Member ID       AZAEL ALEXANDER 1960 [id number]                     Emergency Contacts        (Rel.) Home Phone Work Phone Mobile Phone    IsabelRk (Spouse) [phone] -- [phone]                "

## 2023-10-19 NOTE — PROGRESS NOTES
"   LOS: 5 days     Chief Complaint/ Reason for encounter: ESRD management    Subjective   10/19/23 : She feels a little better today  Still with frequent bowel movements but less diarrhea  BP remains low normal, on midodrine  NG tube remains in place, clamped  She remains n.p.o. with occasional nausea      Medical history reviewed:  History of Present Illness    Subjective    History taken from: Patient and chart    Vital Signs  Temp:  [97.9 °F (36.6 °C)-98.4 °F (36.9 °C)] 98.4 °F (36.9 °C)  Heart Rate:  [] 90  Resp:  [18] 18  BP: ()/(39-57) 104/57       Wt Readings from Last 1 Encounters:   10/19/23 0500 (!) 162 kg (356 lb 11.2 oz)   10/18/23 0510 (!) 166 kg (365 lb)   10/17/23 0537 (!) 168 kg (371 lb 1.6 oz)   10/14/23 1634 (!) 176 kg (387 lb)   10/14/23 0000 (!) 176 kg (387 lb 6.4 oz)   10/13/23 1251 (!) 154 kg (340 lb)       Objective:  Vital signs: (most recent): Blood pressure 104/57, pulse 90, temperature 98.4 °F (36.9 °C), temperature source Axillary, resp. rate 18, height 170.2 cm (67\"), weight (!) 162 kg (356 lb 11.2 oz), SpO2 96%, not currently breastfeeding.                Objective:  General Appearance:  Comfortable, obese, ill-appearing, in no acute distress and not in pain.  Awake, alert, oriented  HEENT: Mucous membranes moist, no injury, oropharynx clear, NG tube clamped  Lungs:  Normal effort and normal respiratory rate.  Breath sounds clear to auscultation.  No  respiratory distress.  No rales, decreased breath sounds or rhonchi.    Heart: Normal rate.  Regular rhythm.  S1, S2 normal.  No murmur.   Abdomen: Abdomen is soft.  Bowel sounds are normal, no abdominal tenderness.  There is no rebound or guarding  Extremities: Trace edema of bilateral lower extremities  Neurological: No focal motor or sensory deficits, pupils reactive  Skin:  Warm and dry.  No rash or cyanosis.       Results Review:    Intake/Output:     Intake/Output Summary (Last 24 hours) at 10/19/2023 1457  Last data " filed at 10/19/2023 1110  Gross per 24 hour   Intake --   Output 700 ml   Net -700 ml         DATA:  Radiology and Labs:  The following labs independently reviewed by me. Additional labs ordered for tomorrow a.m.  Interval notes, chart personally reviewed by me.   Old records independently reviewed showing ESRD on dialysis  The following radiologic studies independently viewed by me, findings CT abdomen pelvis is pending, KUB showed no definitive bowel dilatation  New problems include hypotension  Discussed with patient    Risk/ complexity of medical care/ medical decision making moderate, dialysis management    Labs:   Recent Results (from the past 24 hour(s))   POC Glucose Once    Collection Time: 10/18/23  5:12 PM    Specimen: Blood   Result Value Ref Range    Glucose 99 70 - 130 mg/dL   POC Glucose Once    Collection Time: 10/18/23  8:34 PM    Specimen: Blood   Result Value Ref Range    Glucose 121 70 - 130 mg/dL   POC Glucose Once    Collection Time: 10/19/23 12:48 AM    Specimen: Blood   Result Value Ref Range    Glucose 111 70 - 130 mg/dL   Basic Metabolic Panel    Collection Time: 10/19/23  4:13 AM    Specimen: Blood   Result Value Ref Range    Glucose 124 (H) 65 - 99 mg/dL    BUN 27 (H) 8 - 23 mg/dL    Creatinine 4.94 (H) 0.57 - 1.00 mg/dL    Sodium 140 136 - 145 mmol/L    Potassium 3.8 3.5 - 5.2 mmol/L    Chloride 100 98 - 107 mmol/L    CO2 25.0 22.0 - 29.0 mmol/L    Calcium 8.5 (L) 8.6 - 10.5 mg/dL    BUN/Creatinine Ratio 5.5 (L) 7.0 - 25.0    Anion Gap 15.0 5.0 - 15.0 mmol/L    eGFR 9.2 (L) >60.0 mL/min/1.73   CBC Auto Differential    Collection Time: 10/19/23  4:13 AM    Specimen: Blood   Result Value Ref Range    WBC 8.92 3.40 - 10.80 10*3/mm3    RBC 2.93 (L) 3.77 - 5.28 10*6/mm3    Hemoglobin 9.2 (L) 12.0 - 15.9 g/dL    Hematocrit 28.7 (L) 34.0 - 46.6 %    MCV 98.0 (H) 79.0 - 97.0 fL    MCH 31.4 26.6 - 33.0 pg    MCHC 32.1 31.5 - 35.7 g/dL    RDW 15.6 (H) 12.3 - 15.4 %    RDW-SD 55.2 (H) 37.0 - 54.0  fl    MPV 11.1 6.0 - 12.0 fL    Platelets 157 140 - 450 10*3/mm3    Neutrophil % 70.9 42.7 - 76.0 %    Lymphocyte % 10.8 (L) 19.6 - 45.3 %    Monocyte % 12.6 (H) 5.0 - 12.0 %    Eosinophil % 0.8 0.3 - 6.2 %    Basophil % 0.3 0.0 - 1.5 %    Immature Grans % 4.6 (H) 0.0 - 0.5 %    Neutrophils, Absolute 6.33 1.70 - 7.00 10*3/mm3    Lymphocytes, Absolute 0.96 0.70 - 3.10 10*3/mm3    Monocytes, Absolute 1.12 (H) 0.10 - 0.90 10*3/mm3    Eosinophils, Absolute 0.07 0.00 - 0.40 10*3/mm3    Basophils, Absolute 0.03 0.00 - 0.20 10*3/mm3    Immature Grans, Absolute 0.41 (H) 0.00 - 0.05 10*3/mm3    nRBC 0.3 (H) 0.0 - 0.2 /100 WBC   POC Glucose Once    Collection Time: 10/19/23  5:04 AM    Specimen: Blood   Result Value Ref Range    Glucose 110 70 - 130 mg/dL   POC Glucose Once    Collection Time: 10/19/23  7:58 AM    Specimen: Blood   Result Value Ref Range    Glucose 124 70 - 130 mg/dL       Radiology:  Pertinent radiology studies were reviewed as described above      Medications have been reviewed separately in chart overview      ASSESSMENT:  ESRD on dialysis, Monday Wednesday Friday.  Normally on HHD 5 days a week at home  Metastatic breast cancer  Obesity  Chronic anemia from renal failure and immunotherapy  Hypokalemia  Nausea, vomiting and diarrhea  Chronic hypotension on midodrine  Type 2 diabetes mellitus  Hypothyroidism    Diabetes type 2, controlled    Combined systolic and diastolic congestive heart failure    ANDREW (obstructive sleep apnea)    Plan:   She remains euvolemic on exam with minimal oral intake  Plan HD in a.m. with minimal UF, 3K bath  Continue midodrine for BP support  IV albumin with dialysis as needed for intradialytic hypotension  She is normally on Mircera as an outpatient.  Will continue to substitute high-dose Epogen during this admission  Recheck iron studies and resume IV iron as needed  Immunotherapy per  oncology  Continue scheduled potassium  Okay for CT with contrast today with dialysis to  follow tomorrow askyler.     Will follow     Tai Antonio MD  [facility]   Office phone number: [phone]  Answering service phone number: [phone]    10/19/23  14:57 EDT    Dictation performed using Dragon dictation software

## 2023-10-19 NOTE — CASE MANAGEMENT/SOCIAL WORK
Continued Stay Note  [facility]     Patient Name: Juana Hall  MRN: [id number]  Today's Date: 10/19/2023    Admit Date: 10/13/2023    Plan: DC to SNF with dialysis chair   Discharge Plan       Row Name 10/19/23 1204       Plan    Plan DC to SNF with dialysis chair    Plan Comments Spoke with spouse, after reviewing the provider list, he would like a referral placed to [facility]. Referral placed in Epic, contacted lialisa Cloud and informed of referral.                   Discharge Codes    No documentation.                 Expected Discharge Date and Time       Expected Discharge Date Expected Discharge Time    Oct 20, 2023               Anjali Cerrato RN

## 2023-10-19 NOTE — PROGRESS NOTES
[facility] Medicine Services  PROGRESS NOTE    Patient Name: Juana Hall  : 1960  MRN: [id number]    Date of Admission: 10/13/2023  Primary Care Physician: Kiana Noriega ([place]), BETY    Subjective   Subjective     CC:  Follow-up multiple medical issues including hypotension    Subjective:  Patient states she continues to pass gas and have bowel movements.  She is pleased with her progress.  No current nausea during my evaluation.    Review of Systems  No current fevers or chills  No current headache or lightheadedness  No current chest pain or palpitations      Objective   Objective     Vital Signs:   Temp:  [97.3 °F (36.3 °C)-98.4 °F (36.9 °C)] 98.4 °F (36.9 °C)  Heart Rate:  [] 90  Resp:  [18] 18  BP: ()/(39-57) 104/57        Physical Exam:  Constitutional:Awake, alert, chronically ill-appearing but currently nontoxic  HENT: NG tube is in place, NCAT, mucous membranes moist, neck supple  Respiratory: Relatively clear, moderate inspiration, breathing currently nonlabored  Cardiovascular: Pulse rate is normal, normal radial pulses  Gastrointestinal: Severely obese soft, nontender, nondistended  Musculoskeletal: Severely chronically debilitated in appearance, moderate for lower extremity edema, BMI 60  Psychiatric: calm affect, cooperative, conversational  Neurologic: No slurred speech or facial droop, follows commands  Skin: No rashes or jaundice, warm      Results Reviewed:  Results from last 7 days   Lab Units 10/19/23  0413 10/18/23  0416 10/17/23  0411 10/14/23  0129 10/13/23  1259 10/13/23  1108   WBC 10*3/mm3 8.92 6.41 6.68   < >  --  5.84   HEMOGLOBIN g/dL 9.2* 8.4* 8.7*   < >  --  8.7*   HEMATOCRIT % 28.7* 26.5* 26.4*   < >  --  26.2*   PLATELETS 10*3/mm3 157 116* 103*   < >  --  123*   INR   --   --   --   --   --  1.36*   PROCALCITONIN ng/mL  --   --   --   --  3.83*  --     < > = values in this interval not displayed.     Results from last 7 days   Lab Units 10/19/23  3189  10/18/23  0416 10/17/23  0411 10/15/23  0412 10/14/23  0129 10/13/23  1259 10/13/23  1108   SODIUM mmol/L 140 139 140   < > 139  --  136   POTASSIUM mmol/L 3.8 3.6 4.0   < > 2.8*  --  2.9*   CHLORIDE mmol/L 100 99 101   < > 99  --  96*   CO2 mmol/L 25.0 25.8 23.7   < > 27.0  --  26.4   BUN mg/dL 27* 44* 30*   < > 24*  --  19   CREATININE mg/dL 4.94* 7.41* 6.06*   < > 5.23*  --  4.84*   GLUCOSE mg/dL 124* 143* 136*   < > 101*  --  156*   CALCIUM mg/dL 8.5* 7.5* 8.0*   < > 8.7  --  8.4*   ALK PHOS U/L  --   --   --   --  121*  --  115   ALT (SGPT) U/L  --   --   --   --  13  --  14   AST (SGOT) U/L  --   --   --   --  14  --  15   HSTROP T ng/L  --   --   --   --   --  51* 51*   PROBNP pg/mL  --   --   --   --   --   --  21,914.0*    < > = values in this interval not displayed.     Estimated Creatinine Clearance: 18.3 mL/min (A) (by C-G formula based on SCr of 4.94 mg/dL (H)).    Microbiology Results Abnormal       Procedure Component Value - Date/Time    Blood Culture - Blood, Arm, Right [[id number]]  (Normal) Collected: 10/14/23 0143    Lab Status: Final result Specimen: Blood from Arm, Right Updated: 10/19/23 0201     Blood Culture No growth at 5 days    Blood Culture - Blood, Arm, Right [[id number]]  (Normal) Collected: 10/13/23 2251    Lab Status: Final result Specimen: Blood from Arm, Right Updated: 10/18/23 2331     Blood Culture No growth at 5 days    COVID PRE-OP / PRE-PROCEDURE SCREENING ORDER (NO ISOLATION) - Swab, Nasopharynx [[id number]]  (Normal) Collected: 10/13/23 1057    Lab Status: Final result Specimen: Swab from Nasopharynx Updated: 10/13/23 1307    Narrative:      The following orders were created for panel order COVID PRE-OP / PRE-PROCEDURE SCREENING ORDER (NO ISOLATION) - Swab, Nasopharynx.  Procedure                               Abnormality         Status                     ---------                               -----------         ------                     COVID-19, HAY  IN-HOUSE...[[id number]]  Normal              Final result                 Please view results for these tests on the individual orders.    COVID-19,[facility] HAY IN-HOUSE CEPHEID/JF NP SWAB IN TRANSPORT MEDIA 8-12 HR TAT - Swab, Nasopharynx [[id number]]  (Normal) Collected: 10/13/23 1056    Lab Status: Final result Specimen: Swab from Nasopharynx Updated: 10/13/23 1302     COVID19 Not Detected    Narrative:      Fact sheet for providers: https://www.fda.gov/media/255429/download     Fact sheet for patients: https://www.fda.gov/media/498364/download            Imaging Results (Last 24 Hours)       ** No results found for the last 24 hours. **            Results for orders placed during the hospital encounter of 10/13/23    Adult Transthoracic Echo Complete W/ Cont if Necessary Per Protocol    Interpretation Summary    Left ventricular systolic function is normal. Calculated left ventricular EF = 58.4%    Left ventricular diastolic function was normal.    Severe aortic valve stenosis is present. Aortic valve area is 0.9 cm2.    Peak velocity of the flow distal to the aortic valve is 469.1 cm/s. Aortic valve maximum pressure gradient is 88 mmHg. Aortic valve mean pressure gradient is 48 mmHg. Aortic valve dimensionless index is 0.3 .    Estimated right ventricular systolic pressure from tricuspid regurgitation is moderately elevated (45-55 mmHg). Calculated right ventricular systolic pressure from tricuspid regurgitation is 46 mmHg.    There is mild plaque in the aortic arch present.      I have reviewed the medications:  Scheduled Meds:Abemaciclib, 100 mg, Oral, BID  atorvastatin, 40 mg, Oral, Nightly  diatrizoate meglumine-sodium, 30 mL, Oral, Once  epoetin jana/jana-epbx, 20,000 Units, Subcutaneous, Once per day on Mon Wed Fri  folic acid, 1 mg, Oral, Daily  gabapentin, 300 mg, Oral, Nightly  insulin lispro, 2-9 Units, Subcutaneous, Q4H  levothyroxine, 50 mcg, Oral, Q AM  midodrine, 10 mg, Oral, TID AC  multivitamin, 1  tablet, Oral, Daily  mupirocin, 1 application , Topical, Daily  polyethylene glycol, 17 g, Oral, Daily  potassium chloride, 20 mEq, Oral, Daily  senna-docusate sodium, 2 tablet, Oral, BID  sertraline, 150 mg, Oral, Daily  sodium chloride, 10 mL, Intravenous, Q12H  vitamin B-12, 1,000 mcg, Oral, Daily      Continuous Infusions:     PRN Meds:.  acetaminophen **OR** acetaminophen **OR** acetaminophen    acetaminophen **OR** acetaminophen    acetaminophen    albumin human    senna-docusate sodium **AND** polyethylene glycol **AND** bisacodyl **AND** bisacodyl    dextrose    dextrose    diphenoxylate-atropine    fentaNYL citrate (PF)    glucagon (human recombinant)    HYDROcodone-acetaminophen    influenza vaccine    Menthol-Zinc Oxide    metoclopramide    nitroglycerin    ondansetron **OR** ondansetron    [COMPLETED] Insert Peripheral IV **AND** sodium chloride    sodium chloride    sodium chloride    sodium chloride    Assessment & Plan   Assessment & Plan     Active Hospital Problems    Diagnosis  POA    **Hypotension [I95.9]  Yes    Abnormal urinalysis [R82.90]  Unknown    Hypokalemia [E87.6]  Unknown    Diastolic CHF, chronic [I50.32]  Yes    ANDREW (obstructive sleep apnea) [G47.33]  Yes    Combined systolic and diastolic congestive heart failure [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        Brief Hospital Course to date:  Juana Hall is a 63 y.o. female presents the hospital with hypotension    Hypotension  Urinary tract infection  End-stage renal disease on hemodialysis 5 times weekly  Hypokalemia  Chronic diastolic and systolic  heart failure  Breast cancer with radical mastectomy and chemotherapy  Hypothyroidism  Diabetes mellitus  Obstructive sleep apnea on CPAP  Multiple falls  Fractures in left foot/ankle  Morbid obesity, very severe  Hypoglycemia  Severe aortic valve stenosis  Ileus status post NG tube placement 10/16    Discussion/plan for today:  Nausea has improved.  CT scan reviewed and pending.  I will review it later today and discuss with surgery consult team to determine when it is safe to remove NG tube.  Patient appears to be having return of bowel function.  Surgery recommendations are much appreciated.      Blood pressure reasonable today.  Decrease midodrine dose.      Cardiology note reviewed.  Patient is not thought to be a candidate for valvular intervention at this time.  With severe aortic stenosis long-term prognosis unfortunately is thought to be poor.  Although patient is temporarily improving I feel we need to continue a goals of care discussion.  I will consult palliative care to assist with this.  Patient needs to consider her CODE STATUS and perhaps would be a good candidate for outpatient hospice if she stabilizes further.  I have discussed with patient and her long-term goal is to return home and she understands the complications from her valve issues.  Awaiting palliative team input.  Continue to broach the subject daily.  Paroxysmal atrial fibrillation with cardiology following monitor right.      Respiratory issues have stabilized.  Discussed with pulmonology signed off upon transfer out of the ICU.  Continue to monitor.    Echocardiogram reviewed and severe valvular disease and pulmonary hypertension noted.  Volume management with dialysis.  Plan for dialysis as per nephrology recommendations.    Recent urinalysis reviewed and subsequent pyuria noted.  Culture was positive for E. coli.  Completed treatment for ceftriaxone.   Chest x-ray images reviewed and also possible small right lower lobe pneumonia.   Currently afebrile and reasonably he medically stable.  No further issues.    Replace electrolytes as needed.  Potassium better today.    Platelets better.  Thrombocytopenia likely due to acute illness now resolved.    Continue postoperative shoe for foot injury.  Physical therapy.      Morbid obesity significantly complicates all aspects of care.    DVT Prophylaxis: SCDs added      Disposition: Pending clinical course    CODE STATUS:   Code Status and Medical Interventions:   Ordered at: 10/13/23 1443     Code Status (Patient has no pulse and is not breathing):    CPR (Attempt to Resuscitate)     Medical Interventions (Patient has pulse or is breathing):    Full Support       Zoltan San MD  10/19/23

## 2023-10-20 LAB
ANION GAP SERPL CALCULATED.3IONS-SCNC: 18 MMOL/L (ref 5–15)
BASOPHILS # BLD AUTO: 0.02 10*3/MM3 (ref 0–0.2)
BASOPHILS NFR BLD AUTO: 0.3 % (ref 0–1.5)
BUN SERPL-MCNC: 40 MG/DL (ref 8–23)
BUN/CREAT SERPL: 6.1 (ref 7–25)
CALCIUM SPEC-SCNC: 8.5 MG/DL (ref 8.6–10.5)
CHLORIDE SERPL-SCNC: 97 MMOL/L (ref 98–107)
CO2 SERPL-SCNC: 25 MMOL/L (ref 22–29)
CREAT SERPL-MCNC: 6.6 MG/DL (ref 0.57–1)
DEPRECATED RDW RBC AUTO: 54.7 FL (ref 37–54)
EGFRCR SERPLBLD CKD-EPI 2021: 6.5 ML/MIN/1.73
EOSINOPHIL # BLD AUTO: 0.04 10*3/MM3 (ref 0–0.4)
EOSINOPHIL NFR BLD AUTO: 0.5 % (ref 0.3–6.2)
ERYTHROCYTE [DISTWIDTH] IN BLOOD BY AUTOMATED COUNT: 15.5 % (ref 12.3–15.4)
GLUCOSE BLDC GLUCOMTR-MCNC: 128 MG/DL (ref 70–130)
GLUCOSE BLDC GLUCOMTR-MCNC: 133 MG/DL (ref 70–130)
GLUCOSE BLDC GLUCOMTR-MCNC: 148 MG/DL (ref 70–130)
GLUCOSE BLDC GLUCOMTR-MCNC: 161 MG/DL (ref 70–130)
GLUCOSE SERPL-MCNC: 164 MG/DL (ref 65–99)
HAV IGM SERPL QL IA: NORMAL
HBV CORE IGM SERPL QL IA: NORMAL
HBV SURFACE AG SERPL QL IA: NORMAL
HCT VFR BLD AUTO: 26.5 % (ref 34–46.6)
HCV AB SER DONR QL: NORMAL
HGB BLD-MCNC: 8.4 G/DL (ref 12–15.9)
IMM GRANULOCYTES # BLD AUTO: 0.34 10*3/MM3 (ref 0–0.05)
IMM GRANULOCYTES NFR BLD AUTO: 4.3 % (ref 0–0.5)
LYMPHOCYTES # BLD AUTO: 0.75 10*3/MM3 (ref 0.7–3.1)
LYMPHOCYTES NFR BLD AUTO: 9.5 % (ref 19.6–45.3)
MCH RBC QN AUTO: 31.3 PG (ref 26.6–33)
MCHC RBC AUTO-ENTMCNC: 31.7 G/DL (ref 31.5–35.7)
MCV RBC AUTO: 98.9 FL (ref 79–97)
MONOCYTES # BLD AUTO: 0.82 10*3/MM3 (ref 0.1–0.9)
MONOCYTES NFR BLD AUTO: 10.3 % (ref 5–12)
NEUTROPHILS NFR BLD AUTO: 5.96 10*3/MM3 (ref 1.7–7)
NEUTROPHILS NFR BLD AUTO: 75.1 % (ref 42.7–76)
NRBC BLD AUTO-RTO: 0.5 /100 WBC (ref 0–0.2)
PLATELET # BLD AUTO: 161 10*3/MM3 (ref 140–450)
PMV BLD AUTO: 10.9 FL (ref 6–12)
POTASSIUM SERPL-SCNC: 3.5 MMOL/L (ref 3.5–5.2)
RBC # BLD AUTO: 2.68 10*6/MM3 (ref 3.77–5.28)
SODIUM SERPL-SCNC: 140 MMOL/L (ref 136–145)
WBC NRBC COR # BLD: 7.93 10*3/MM3 (ref 3.4–10.8)

## 2023-10-20 PROCEDURE — 86704 HEP B CORE ANTIBODY TOTAL: CPT | Performed by: INTERNAL MEDICINE

## 2023-10-20 PROCEDURE — 25010000002 ALBUMIN HUMAN 25% PER 50 ML: Performed by: INTERNAL MEDICINE

## 2023-10-20 PROCEDURE — 63710000001 INSULIN LISPRO (HUMAN) PER 5 UNITS: Performed by: INTERNAL MEDICINE

## 2023-10-20 PROCEDURE — 80048 BASIC METABOLIC PNL TOTAL CA: CPT | Performed by: INTERNAL MEDICINE

## 2023-10-20 PROCEDURE — P9047 ALBUMIN (HUMAN), 25%, 50ML: HCPCS | Performed by: INTERNAL MEDICINE

## 2023-10-20 PROCEDURE — 82948 REAGENT STRIP/BLOOD GLUCOSE: CPT

## 2023-10-20 PROCEDURE — 99232 SBSQ HOSP IP/OBS MODERATE 35: CPT | Performed by: SURGERY

## 2023-10-20 PROCEDURE — 25010000002 EPOETIN ALFA-EPBX 20000 UNIT/ML SOLUTION: Performed by: INTERNAL MEDICINE

## 2023-10-20 PROCEDURE — 99232 SBSQ HOSP IP/OBS MODERATE 35: CPT | Performed by: INTERNAL MEDICINE

## 2023-10-20 PROCEDURE — 80074 ACUTE HEPATITIS PANEL: CPT | Performed by: INTERNAL MEDICINE

## 2023-10-20 PROCEDURE — 97110 THERAPEUTIC EXERCISES: CPT

## 2023-10-20 PROCEDURE — 85025 COMPLETE CBC W/AUTO DIFF WBC: CPT | Performed by: HOSPITALIST

## 2023-10-20 RX ORDER — MIDODRINE HYDROCHLORIDE 5 MG/1
5 TABLET ORAL ONCE
Status: COMPLETED | OUTPATIENT
Start: 2023-10-20 | End: 2023-10-20

## 2023-10-20 RX ORDER — PANTOPRAZOLE SODIUM 40 MG/10ML
40 INJECTION, POWDER, LYOPHILIZED, FOR SOLUTION INTRAVENOUS
Status: DISCONTINUED | OUTPATIENT
Start: 2023-10-20 | End: 2023-10-26 | Stop reason: HOSPADM

## 2023-10-20 RX ORDER — ALBUMIN (HUMAN) 12.5 G/50ML
12.5 SOLUTION INTRAVENOUS ONCE
Status: DISCONTINUED | OUTPATIENT
Start: 2023-10-20 | End: 2023-10-26 | Stop reason: HOSPADM

## 2023-10-20 RX ADMIN — INSULIN LISPRO 2 UNITS: 100 INJECTION, SOLUTION INTRAVENOUS; SUBCUTANEOUS at 06:27

## 2023-10-20 RX ADMIN — EPOETIN ALFA-EPBX 20000 UNITS: 20000 INJECTION, SOLUTION INTRAVENOUS; SUBCUTANEOUS at 09:51

## 2023-10-20 RX ADMIN — SENNOSIDES AND DOCUSATE SODIUM 2 TABLET: 50; 8.6 TABLET ORAL at 09:51

## 2023-10-20 RX ADMIN — Medication 10 ML: at 21:30

## 2023-10-20 RX ADMIN — MIDODRINE HYDROCHLORIDE 5 MG: 5 TABLET ORAL at 11:43

## 2023-10-20 RX ADMIN — ALBUMIN (HUMAN) 12.5 G: 12.5 SOLUTION INTRAVENOUS at 15:39

## 2023-10-20 RX ADMIN — POTASSIUM CHLORIDE 20 MEQ: 750 TABLET, EXTENDED RELEASE ORAL at 09:51

## 2023-10-20 RX ADMIN — MIDODRINE HYDROCHLORIDE 5 MG: 5 TABLET ORAL at 19:02

## 2023-10-20 RX ADMIN — Medication 1000 MCG: at 09:51

## 2023-10-20 RX ADMIN — FOLIC ACID 1 MG: 1 TABLET ORAL at 09:51

## 2023-10-20 RX ADMIN — Medication 1 TABLET: at 09:51

## 2023-10-20 RX ADMIN — MUPIROCIN 1 APPLICATION: 20 OINTMENT TOPICAL at 09:52

## 2023-10-20 RX ADMIN — SERTRALINE 150 MG: 100 TABLET, FILM COATED ORAL at 09:51

## 2023-10-20 RX ADMIN — LEVOTHYROXINE SODIUM 50 MCG: 50 TABLET ORAL at 06:50

## 2023-10-20 RX ADMIN — PANTOPRAZOLE SODIUM 40 MG: 40 INJECTION, POWDER, FOR SOLUTION INTRAVENOUS at 17:18

## 2023-10-20 RX ADMIN — POLYETHYLENE GLYCOL 3350 17 G: 17 POWDER, FOR SOLUTION ORAL at 09:51

## 2023-10-20 RX ADMIN — SENNOSIDES AND DOCUSATE SODIUM 2 TABLET: 50; 8.6 TABLET ORAL at 21:30

## 2023-10-20 RX ADMIN — GABAPENTIN 300 MG: 300 CAPSULE ORAL at 21:30

## 2023-10-20 RX ADMIN — MIDODRINE HYDROCHLORIDE 5 MG: 5 TABLET ORAL at 06:50

## 2023-10-20 RX ADMIN — MIDODRINE HYDROCHLORIDE 5 MG: 5 TABLET ORAL at 06:51

## 2023-10-20 RX ADMIN — Medication 10 ML: at 09:52

## 2023-10-20 RX ADMIN — ATORVASTATIN CALCIUM 40 MG: 20 TABLET, FILM COATED ORAL at 21:29

## 2023-10-20 NOTE — PROGRESS NOTES
REASON FOR FOLLOW-UP: Metastatic breast cancer    Interval history:  Patient seen today during her hemodialysis session.  Her NG is clamped until 6 PM.  She has not had nausea/emesis since clamping her CT.      HISTORY OF PRESENT ILLNESS:   This is a 63-year-old woman followed by Dr. Irvin in our practice for metastatic lobular breast cancer which is ER/VT positive on treatment with Faslodex 500 mg IM every 4 weeks and abemaciclib 100 mg twice daily as of her most recent office visit 9/6/2023.  The patient also has anemia of end-stage renal disease/malignancy/treatment related.  Comorbidities include end-stage renal disease on hemodialysis, CHF/aortic stenosis.     The patient was recently admitted for intractable nausea vomiting diarrhea noted to have significant ascites for which she underwent a paracentesis.  Stool was positive for C. difficile toxin but negative for C. difficile antigen consistent with carrier state.  After her paracentesis of 11 L she became progressively weak and dizzy.  She called EMS and was brought to the ER found to be significantly hypotensive treated with fluid boluses but remained hypotensive requiring transfer to the ICU.  Patient also diagnosed with urinary tract infection and ileus (she was using a lot of Imodium at home for diarrhea) requiring NG tube.  She is currently on Rocephin      Past Medical History, Past Surgical History, Social History, Family History have been reviewed and are without significant changes except as mentioned.    Review of Systems   Constitutional:  Positive for activity change and fatigue.   Respiratory:  Negative for shortness of breath.    Gastrointestinal:  Negative for abdominal pain, nausea and vomiting.   Genitourinary:  Positive for difficulty urinating.   Musculoskeletal:  Positive for gait problem.   Neurological:  Positive for weakness. Negative for light-headedness.   Hematological:  Does not bruise/bleed easily.        Medications:  The current  medication list was reviewed in the EMR    ALLERGIES:  No Known Allergies    Objective      Vitals:    10/20/23 1315   BP: 95/61   Pulse: (!) 135   Resp:    Temp:    SpO2:           Physical Exam    CONSTITUTIONAL: pleasant well-developed/obese adult woman in no acute distress seen on dialysis  HEENT: no icterus, no thrush, moist membranes, NG tube in place but clamped  LYMPH: no cervical or supraclavicular lad  CV: Tachycardic S1S2, no murmur  RESP: cta bilat, no wheezing, no rales  GI: soft, non-tender, no splenomegaly, +bs, obese  MUSC: 1+ dependent edema  NEURO: alert and oriented x3, mild global weakness  PSYCH: normal mood and affect    RECENT LABS:  Hematology Results from last 7 days   Lab Units 10/20/23  0534 10/19/23  0413 10/18/23  0416   WBC 10*3/mm3 7.93 8.92 6.41   HEMOGLOBIN g/dL 8.4* 9.2* 8.4*   HEMATOCRIT % 26.5* 28.7* 26.5*   PLATELETS 10*3/mm3 161 157 116*     2  Lab Results   Component Value Date    GLUCOSE 164 (H) 10/20/2023    BUN 40 (H) 10/20/2023    CREATININE 6.60 (H) 10/20/2023    EGFRIFNONA 11 (L) 02/16/2022    EGFRIFAFRI  01/29/2022      Comment:      <15 Indicative of kidney failure.    BCR 6.1 (L) 10/20/2023    CO2 25.0 10/20/2023    CALCIUM 8.5 (L) 10/20/2023    PROTENTOTREF 7.0 03/16/2019    ALBUMIN 2.0 (L) 10/14/2023    LABIL2 1.2 03/16/2019    AST 14 10/14/2023    ALT 13 10/14/2023       Lab Results   Component Value Date    IRON 46 10/15/2023    TIBC 100 (L) 10/15/2023    FERRITIN 943.00 (H) 10/15/2023       Lab Results   Component Value Date    FFGSTOBE73 1,146 (H) 03/16/2023       Lab Results   Component Value Date    FOLATE >20.00 03/16/2023          Assessment & Plan   *Stage IV hormone receptor positive lobular breast cancer  7/3/2023-PET showed metastatic disease to bone, hypermetabolic ascites with soft tissue density anterior pelvis (possible peritoneal carcinomatosis), soft tissue thickening left renal pelvis  Paracentesis 10/9/2023-cytology showed rare atypical cells  suspicious for lobular carcinoma but ER/OH negative?  Patient currently on hormonal therapy with monthly Faslodex combined with abemaciclib 100 mg twice daily  Restaging PET scheduled for 10/23/2023 and to see Dr. Irvin 10/27/2023     *Hypotension acute on chronic  Patient developed hypotension after large volume paracentesis (11 L) combined with end-stage renal disease, UTI, AS  Patient now off vasopressors and on midodrine 10 mg 3 times daily but blood pressure is soft 88/54  Completed Rocephin for UTI     *End-stage renal disease on hemodialysis     *Aortic valve stenosis unamenable to intervention per cardiology     *Chronic anemia, multifactorial  Hemoglobin stable 8.4, ferritin 943, iron sat 46%  She receives scheduled Retacrit 3 times per week     *Ileus with NG tube  Hopefully secondary to overuse of Imodium and not related to carcinomatous/mechanical  Symptomatically improving with NG decompression-KUB 10/15 showed improved gaseous distention of the stomach  Persistent nausea/vomiting when NG clamped  CT abdomen/pelvis 10/19/2023-moderately large ascites, thickened gastric antrum consistent with antritis versus metastatic disease, duodenum collapsed, colon collapsed, small left pleural effusion, T11 sclerosis     *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 Irvin scheduled before end of the month  Monitor CBC/labs  Unclear if the patient's GI issues are secondary to malignant obstruction?  If so would portend a poor prognosis under the circumstances of her morbid obesity, end-stage renal disease, untreatable ABS etc.  Check CA 15-3-stable/normal 21.3                     10/20/2023      CC:

## 2023-10-20 NOTE — PROGRESS NOTES
"   LOS: 6 days     Chief Complaint/ Reason for encounter: ESRD management    Subjective   10/19/23 : She feels a little better today  Still with frequent bowel movements but less diarrhea  BP remains low normal, on midodrine  NG tube remains in place, clamped  She remains n.p.o. with occasional nausea    10/20: NG tube remains in place, still with copious amounts of NG output  Not as much diarrhea, no abdominal pain  Scheduled for dialysis today.  Weights very labile and do not seem accurate to me    Medical history reviewed:  History of Present Illness    Subjective    History taken from: Patient and chart    Vital Signs  Temp:  [97.5 °F (36.4 °C)-97.9 °F (36.6 °C)] 97.9 °F (36.6 °C)  Heart Rate:  [91-98] 98  Resp:  [18] 18  BP: (107-113)/(45-56) 107/56       Wt Readings from Last 1 Encounters:   10/20/23 0457 (!) 167 kg (369 lb 1.6 oz)   10/19/23 0500 (!) 162 kg (356 lb 11.2 oz)   10/18/23 0510 (!) 166 kg (365 lb)   10/17/23 0537 (!) 168 kg (371 lb 1.6 oz)   10/14/23 1634 (!) 176 kg (387 lb)   10/14/23 0000 (!) 176 kg (387 lb 6.4 oz)   10/13/23 1251 (!) 154 kg (340 lb)       Objective:  Vital signs: (most recent): Blood pressure 107/56, pulse 98, temperature 97.9 °F (36.6 °C), temperature source Oral, resp. rate 18, height 170.2 cm (67\"), weight (!) 167 kg (369 lb 1.6 oz), SpO2 98%, not currently breastfeeding.                Objective:  General Appearance:  Comfortable, obese, ill-appearing, in no acute distress and not in pain.  Awake, alert, oriented  HEENT: Mucous membranes moist, no injury, oropharynx clear, NG tube in place with high output  Lungs:  Normal effort and normal respiratory rate.  Breath sounds clear to auscultation.  No  respiratory distress.  No rales, decreased breath sounds or rhonchi.    Heart: Normal rate.  Regular rhythm.  S1, S2 normal.  No murmur.   Abdomen: Abdomen is soft.  Bowel sounds are normal, no abdominal tenderness.  There is no rebound or guarding  Extremities: Trace edema of " bilateral lower extremities  Neurological: No focal motor or sensory deficits, pupils reactive  Skin:  Warm and dry.  No rash or cyanosis.       Results Review:    Intake/Output:     Intake/Output Summary (Last 24 hours) at 10/20/2023 1111  Last data filed at 10/19/2023 2310  Gross per 24 hour   Intake --   Output 850 ml   Net -850 ml         DATA:  Radiology and Labs:  The following labs independently reviewed by me. Additional labs ordered for tomorrow a.m.  Interval notes, chart personally reviewed by me.   Old records independently reviewed showing ESRD on dialysis  The following radiologic studies independently viewed by me, findings CT of the abdomen and pelvis yesterday showed reaccumulation of moderately large volume ascites, possible gastritis versus metastatic progression left ureteral stent in place without hydronephrosis small left pleural effusion  Discussed with patient    Risk/ complexity of medical care/ medical decision making moderate, dialysis management    Labs:   Recent Results (from the past 24 hour(s))   POC Glucose Once    Collection Time: 10/19/23  5:04 PM    Specimen: Blood   Result Value Ref Range    Glucose 144 (H) 70 - 130 mg/dL   POC Glucose Once    Collection Time: 10/19/23  9:16 PM    Specimen: Blood   Result Value Ref Range    Glucose 150 (H) 70 - 130 mg/dL   POC Glucose Once    Collection Time: 10/19/23 10:07 PM    Specimen: Blood   Result Value Ref Range    Glucose 149 (H) 70 - 130 mg/dL   Hepatitis Panel, Acute    Collection Time: 10/20/23  5:34 AM    Specimen: Blood   Result Value Ref Range    Hepatitis B Surface Ag Non-Reactive Non-Reactive    Hep A IgM Non-Reactive Non-Reactive    Hep B C IgM Non-Reactive Non-Reactive    Hepatitis C Ab Non-Reactive Non-Reactive   Basic Metabolic Panel    Collection Time: 10/20/23  5:34 AM    Specimen: Blood   Result Value Ref Range    Glucose 164 (H) 65 - 99 mg/dL    BUN 40 (H) 8 - 23 mg/dL    Creatinine 6.60 (H) 0.57 - 1.00 mg/dL    Sodium 140  136 - 145 mmol/L    Potassium 3.5 3.5 - 5.2 mmol/L    Chloride 97 (L) 98 - 107 mmol/L    CO2 25.0 22.0 - 29.0 mmol/L    Calcium 8.5 (L) 8.6 - 10.5 mg/dL    BUN/Creatinine Ratio 6.1 (L) 7.0 - 25.0    Anion Gap 18.0 (H) 5.0 - 15.0 mmol/L    eGFR 6.5 (L) >60.0 mL/min/1.73   CBC Auto Differential    Collection Time: 10/20/23  5:34 AM    Specimen: Blood   Result Value Ref Range    WBC 7.93 3.40 - 10.80 10*3/mm3    RBC 2.68 (L) 3.77 - 5.28 10*6/mm3    Hemoglobin 8.4 (L) 12.0 - 15.9 g/dL    Hematocrit 26.5 (L) 34.0 - 46.6 %    MCV 98.9 (H) 79.0 - 97.0 fL    MCH 31.3 26.6 - 33.0 pg    MCHC 31.7 31.5 - 35.7 g/dL    RDW 15.5 (H) 12.3 - 15.4 %    RDW-SD 54.7 (H) 37.0 - 54.0 fl    MPV 10.9 6.0 - 12.0 fL    Platelets 161 140 - 450 10*3/mm3    Neutrophil % 75.1 42.7 - 76.0 %    Lymphocyte % 9.5 (L) 19.6 - 45.3 %    Monocyte % 10.3 5.0 - 12.0 %    Eosinophil % 0.5 0.3 - 6.2 %    Basophil % 0.3 0.0 - 1.5 %    Immature Grans % 4.3 (H) 0.0 - 0.5 %    Neutrophils, Absolute 5.96 1.70 - 7.00 10*3/mm3    Lymphocytes, Absolute 0.75 0.70 - 3.10 10*3/mm3    Monocytes, Absolute 0.82 0.10 - 0.90 10*3/mm3    Eosinophils, Absolute 0.04 0.00 - 0.40 10*3/mm3    Basophils, Absolute 0.02 0.00 - 0.20 10*3/mm3    Immature Grans, Absolute 0.34 (H) 0.00 - 0.05 10*3/mm3    nRBC 0.5 (H) 0.0 - 0.2 /100 WBC   POC Glucose Once    Collection Time: 10/20/23  8:26 AM    Specimen: Blood   Result Value Ref Range    Glucose 161 (H) 70 - 130 mg/dL       Radiology:  Pertinent radiology studies were reviewed as described above      Medications have been reviewed separately in chart overview      ASSESSMENT:  ESRD on dialysis, Monday Wednesday Friday.  Normally on HHD 5 days a week at home  Metastatic breast cancer  Obesity  Recurrent ascites  Chronic anemia from renal failure and immunotherapy  Hypokalemia  Nausea, vomiting and diarrhea  Chronic hypotension on midodrine  Type 2 diabetes mellitus  Hypothyroidism    Diabetes type 2, controlled    Combined systolic and  diastolic congestive heart failure    ANDREW (obstructive sleep apnea)      Plan:   BP stable and she appears hemodynamically stable for HD today  Continue HD Monday Wednesday Friday  She is not eating or drinking and is still having high NG output so will not plan for any UF with her treatment today  Change to 4K bath and resume potassium supplements once taking p.o.  To drain for BP support  IV albumin with dialysis as needed for intradialytic hypotension  Epogen with dialysis for anemia.  I reviewed her iron studies from last week and iron stores appear appropriate with a ferritin of 943, low TIBC consistent with anemia of CKD    Immunotherapy per  oncology  Will follow     Tai Antonio MD  [facility]   Office phone number: [phone]  Answering service phone number: [phone]    10/20/23  11:11 EDT    Dictation performed using Dragon dictation software

## 2023-10-20 NOTE — THERAPY TREATMENT NOTE
Patient Name: Juana Hall  : 1960    MRN: [id number]                              Today's Date: 10/20/2023       Admit Date: 10/13/2023    Visit Dx:     ICD-10-CM ICD-9-CM   1. Hypotension, unspecified hypotension type  I95.9 458.9   2. Acute UTI  N39.0 599.0   3. End-stage renal disease on hemodialysis  N18.6 585.6    Z99.2 V45.11   4. Other fracture of left foot, initial encounter for closed fracture  S92.812A 825.20   5. Anemia in stage 3 chronic kidney disease, unspecified whether stage 3a or 3b CKD  N18.30 285.21    D63.1 585.3     Patient Active Problem List   Diagnosis    Anxiety    Depression    Diabetes type 2, controlled    Hyperlipidemia    Heart murmur    Hypertension    Post-traumatic osteoarthritis of multiple joints    Psoriasis    Radiculopathy    Acquired hypothyroidism    Peripheral neuropathy    Normocytic anemia    History of right breast cancer    Omental mass    Primary malignant neoplasm of breast with metastasis    Elevated serum creatinine    Iron deficiency anemia    Anemia in stage 3 chronic kidney disease    Stage 3b chronic kidney disease    Anxiety and depression    RYAN (acute kidney injury)    Anemia, chronic disease    Diastolic CHF, chronic    Frequent UTI    Metabolic acidosis    Severe malnutrition    Hydronephrosis    Generalized weakness    COVID-19 virus detected    Hypocalcemia    Morbid obesity with BMI of 50.0-59.9, adult    ESRD (end stage renal disease)    Pancytopenia due to chemotherapy    Chronic anemia    Rectal bleeding    Bicuspid aortic valve    Complicated UTI (urinary tract infection)    Acute UTI (urinary tract infection)    Severe aortic stenosis    Combined systolic and diastolic congestive heart failure    Depression    ANDREW (obstructive sleep apnea)    Diastolic CHF, chronic    Adverse effect of chemotherapy    UTI (urinary tract infection)    Hematuria    Dysuria    Intractable vomiting    Hypotension    Abnormal urinalysis    Hypokalemia     Past  Medical History:   Diagnosis Date    Anemia     Anxiety and depression     Bicuspid aortic valve     had surgery    Breast cancer 2004    RIGHT, HAD NEELA. 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    History of sepsis 2010    KIDNEY STONE    History of transfusion     no reaction    Hyperlipidemia     Hyperthyroidism     Hypothyroidism     Kidney stone     CURRENT LEFT-DEC 2021    Lymphedema of leg     LEFT    Metastasis from breast cancer 2019    STOMACH-OMENTUM    Neuromuscular disorder     Obesity     ANDREW on CPAP     CPAP    Osteoarthrosis     Peripheral neuropathy     FEET BILAT    Radiculopathy     Rectal bleeding 08/16/2022    Thickened endometrium     Urinary incontinence     wears pads    Weakness     BILAT LEGS-WITH ANY AMT OF TIME     Past Surgical History:   Procedure Laterality Date    AORTIC VALVULOPLASTY  01/2023    ARTERIOVENOUS FISTULA/SHUNT SURGERY Left 11/03/2021    Procedure: LEFT  BRACHIOCEPALIC ARTERIOVENOUS FISTULA;  Surgeon: Dejuan Adler MD;  Location: [facility] MAIN OR;  Service: Vascular;  Laterality: Left;    BREAST RECONSTRUCTION  2004    WITH IMPLANTS, AND REVISION, NOW REMOVED    BREAST SURGERY Bilateral 2004    breast implants removed and then replaced due to infection    CARDIAC CATHETERIZATION N/A 08/23/2022    Procedure: CORONARY ANGIOGRAPHY;  Surgeon: Luis Rivers MD;  Location: [facility] CATH INVASIVE LOCATION;  Service: Cardiology;  Laterality:  N/A;    CARDIAC CATHETERIZATION N/A 2022    Procedure: Right Heart Cath;  Surgeon: Luis Rivers MD;  Location: [facility] CATH INVASIVE LOCATION;  Service: Cardiology;  Laterality: N/A;    CARDIAC CATHETERIZATION N/A 01/10/2023    Procedure: Valvuloplasty;  Surgeon: Luis Rivers MD;  Location: [facility] CATH INVASIVE LOCATION;  Service: Cardiovascular;  Laterality: N/A;  01/10/2023    CARDIAC CATHETERIZATION N/A 01/10/2023    Procedure: Aortic root aortogram;  Surgeon: Luis Rivers MD;  [facility];  Service: Cardiovascular;  Laterality: N/A;    CATARACT EXTRACTION WITH INTRAOCULAR LENS IMPLANT Bilateral      SECTION  1987     SECTION  1987    CYSTOSCOPY W/ URETERAL STENT PLACEMENT      CYSTOSCOPY W/ URETERAL STENT PLACEMENT Left 2021    Procedure: CYSTOSCOPY KEFT RETROGRADE PYELOGRAM  LEFT  URETERAL STENT PLACEMENT;  Surgeon: Leodan Mckee Jr., MD;  Location: [facility] OR;  Service: Urology;  Laterality: Left;    CYSTOSCOPY W/ URETERAL STENT PLACEMENT Left 03/10/2022    Procedure: CYSTOSCOPY AND LEFT URETERAL STENT EXCHANGE, RETROGRADE PYELOGRAM;  Surgeon: Leodan Mckee Jr., MD;  Location: [facility] OR;  Service: Urology;  Laterality: Left;    CYSTOSCOPY W/ URETERAL STENT PLACEMENT Left 2023    Procedure: CYSTOSCOPY WITH LEFT URETERAL STENT PLACEMENT, RETROGRADE PYELOGRAM, CLOT EVACUATION, BLADDER FULGARATION;  Surgeon: Leodan Mckee Jr., MD;  Location: [facility] MAIN OR;  Service: Urology;  Laterality: Left;    D & C HYSTEROSCOPY N/A 2017    Procedure: DILATATION AND CURETTAGE, HYSTEROSCOPY ;  Surgeon: Cherie Oliveros MD;  Location: [facility] OR OSC;  Service:     D & C HYSTEROSCOPY N/A 2019    Procedure: DILATATION AND CURETTAGE HYSTEROSCOPY/POLYPECTOMY;  Surgeon: Cherie Oliveros MD;  [facility];  Service: Gynecology    D & C HYSTEROSCOPY ENDOMETRIAL ABLATION N/A  5/5/2023    Procedure: DILATATION AND CURETTAGE;  Surgeon: Ina Marcos MD;  Location: [facility] OR;  Service: Obstetrics/Gynecology;  Laterality: N/A;    ENDOSCOPY      INSERTION AND REMOVAL HEMODIALYSIS CATHETER Right 05/01/2021    INSERTION HEMODIALYSIS CATHETER Right 05/01/2021    Procedure: HEMODIALYSIS CATHETER INSERTION;  Surgeon: Clint Hernández MD;  Location: [facility] OR;  Service: Vascular;  Laterality: Right;    LYMPH NODE BIOPSY      MASTECTOMY Bilateral 2004    VENOUS ACCESS DEVICE (PORT) INSERTION AND REMOVAL        General Information       Row Name 10/20/23 1254          Physical Therapy Time and Intention    Document Type therapy note (daily note)  -     Mode of Treatment individual therapy;physical therapy  -       Row Name 10/20/23 1254          General Information    Patient Profile Reviewed yes  -     Existing Precautions/Restrictions fall;NPO  -       Row Name 10/20/23 1254          Cognition    Orientation Status (Cognition) oriented x 3  -       Row Name 10/20/23 1254          Safety Issues, Functional Mobility    Impairments Affecting Function (Mobility) balance;coordination;endurance/activity tolerance;range of motion (ROM);strength;postural/trunk control  fatigued quickly w/sitting position, had to rest on L elbow x4 during session , then sit back up  -               User Key  (r) = Recorded By, (t) = Taken By, (c) = Cosigned By      Initials Name Provider Type     Ana Parisi PTA Physical Therapist Assistant                   Mobility       Row Name 10/20/23 1257          Bed Mobility    Scooting/Bridging [place] (Bed Mobility) 2 person assist;moderate assist (50% patient effort);maximum assist (25% patient effort);verbal cues  -     Assistive Device (Bed Mobility) bed rails;draw sheet  -     Comment, (Bed Mobility) worked on sup-sit , pt requested bed deflated  -       Row Name 10/20/23 1257          Sit-Stand Transfer    Comment, (Sit-Stand  Transfer) pt too weak to attempt, she states maybe next session  -               User Key  (r) = Recorded By, (t) = Taken By, (c) = Cosigned By      Initials Name Provider Type    Ana Martinez PTA Physical Therapist Assistant                   Obj/Interventions       Row Name 10/20/23 1258          Motor Skills    Therapeutic Exercise --  pt sat EOB > 20min, worked on posture, UE WB, LAQs x10 w/rest breaks to complete  -               User Key  (r) = Recorded By, (t) = Taken By, (c) = Cosigned By      Initials Name Provider Type    Ana Martinez PTA Physical Therapist Assistant                   Goals/Plan    No documentation.                  Clinical Impression       Row Name 10/20/23 1300          Pain    Pretreatment Pain Rating 0/10 - no pain  -     Posttreatment Pain Rating 0/10 - no pain  -[facility] Name 10/20/23 6427 10/20/23 1300       Plan of Care Review    Plan of Care Reviewed With -- patient;spouse  -    Outcome Evaluation Pt agreed to PT session, pt worked on sitting EOB>20 min, pt perf LAQs x10 w/rest breaks to complete, limited endurance, had to rest on L elbow x4 during sit time, required assist of 2 for mobility but pt was participating, jayden bed and mattress making it difficult for pt to assist more, did also use trapeze to assist w/bed mobility, pt motivated , plans SNU at [place] and hopes to try standing next session  - --      Row Name 10/20/23 9138          Therapy Assessment/Plan (PT)    Rehab Potential (PT) fair, will monitor progress closely  -     Criteria for Skilled Interventions Met (PT) yes  -[facility] Name 10/20/23 5966          Positioning and Restraints    Pre-Treatment Position in bed  -     Post Treatment Position bed  -JM     In Bed fowlers;call light within reach;encouraged to call for assist;exit alarm on;with family/caregiver  -               User Key  (r) = Recorded By, (t) = Taken By, (c) = Cosigned By      Initials Name Provider Type    ADRIA  Ana Parisi PTA Physical Therapist Assistant                   Outcome Measures       Row Name 10/20/23 1345          How much help from another person do you currently need...    Turning from your back to your side while in flat bed without using bedrails? 2  -JM     Moving from lying on back to sitting on the side of a flat bed without bedrails? 2  -JM     Moving to and from a bed to a chair (including a wheelchair)? 1  -JM     Standing up from a chair using your arms (e.g., wheelchair, bedside chair)? 1  -JM     Climbing 3-5 steps with a railing? 1  -JM     To walk in hospital room? 1  -JM     AM-PAC 6 Clicks Score (PT) 8  -JM     Highest level of mobility 3 --> Sat at edge of bed  -ADRIA               User Key  (r) = Recorded By, (t) = Taken By, (c) = Cosigned By      Initials Name Provider Type    Ana Martinez PTA Physical Therapist Assistant                                 Physical Therapy Education       Title: PT OT SLP Therapies (Done)       Topic: Physical Therapy (Done)       Point: Mobility training (Done)       Learning Progress Summary             Patient Eager, E,TB,D, VU by  at 10/20/2023 1345    Acceptance, E, NR by [place] at 10/18/2023 1549    Acceptance, E,D, DU by PC at 10/16/2023 1629    Acceptance, E, VU by BL at 10/14/2023 0630   Family Eager, E,TB,D, VU by  at 10/20/2023 1345                         Point: Home exercise program (Done)       Learning Progress Summary             Patient Eager, E,TB,D, VU by  at 10/20/2023 1345    Acceptance, E, NR by [place] at 10/18/2023 1549    Acceptance, E,D, DU by PC at 10/16/2023 1629    Acceptance, E, VU by BL at 10/14/2023 0630   Family Eager, E,TB,D, VU by  at 10/20/2023 1345                         Point: Body mechanics (Done)       Learning Progress Summary             Patient Eager, E,TB,D, VU by  at 10/20/2023 1345    Acceptance, E, NR by [place] at 10/18/2023 1549    Acceptance, E,D, DU by PC at 10/16/2023 1629    Acceptance, E, VU by BL  at 10/14/2023 0630   Family Eager, E,TB,D, VU by  at 10/20/2023 1345                         Point: Precautions (Done)       Learning Progress Summary             Patient Eager, E,TB,D, VU by  at 10/20/2023 1345    Acceptance, E, NR by [place] at 10/18/2023 1549    Acceptance, E,D, DU by PC at 10/16/2023 1629    Acceptance, E,D, VU,NR by MG at 10/15/2023 1148    Acceptance, E, VU by BL at 10/14/2023 0630   Family Eager, E,TB,D, VU by  at 10/20/2023 1345                                         User Key       Initials Effective Dates Name Provider Type Discipline    PC 06/16/21 -  Sabi Kevin PT Physical Therapist PT     03/07/18 -  Ana Parisi PTA Physical Therapist Assistant PT    [place] 06/16/21 -  Maria E Negrete, PT Physical Therapist PT    [facility] 05/24/22 -  Karla Bhatia, PT Physical Therapist PT    [facility] 10/18/22 -  Zainab Grace, RN Registered Nurse Nurse                  PT Recommendation and Plan     Plan of Care Reviewed With: patient, spouse  Outcome Evaluation: Pt agreed to PT session, pt worked on sitting EOB>20 min, pt perf LAQs x10 w/rest breaks to complete, limited endurance, had to rest on L elbow x4 during sit time, required assist of 2 for mobility but pt was participating, jayden bed and mattress making it difficult for pt to assist more, did also use trapeze to assist w/bed mobility, pt motivated , plans SNU at [place] and hopes to try standing next session     Time Calculation:         PT Charges       Row Name 10/20/23 1253             Time Calculation    Start Time 1010  -      Stop Time 1036  -      Time Calculation (min) 26 min  -      PT Received On 10/20/23  -      PT - Next Appointment 10/21/23  -                User Key  (r) = Recorded By, (t) = Taken By, (c) = Cosigned By      Initials Name Provider Type    Ana Martinez PTA Physical Therapist Assistant                  Therapy Charges for Today       Code Description Service Date Service Provider Modifiers Qty     41411473655 HC PT THER PROC EA 15 MIN 10/20/2023 Ana Parisi PTA GP 2    57303674185 HC PT THER SUPP EA 15 MIN 10/20/2023 Ana Parisi PTA GP 2            PT G-Codes  Outcome Measure Options: AM-PAC 6 Clicks Basic Mobility (PT)  AM-PAC 6 Clicks Score (PT): 8  AM-PAC 6 Clicks Score (OT): 11  PT Discharge Summary  Anticipated Discharge Disposition (PT): skilled nursing facility    Ana Parisi PTA  10/20/2023

## 2023-10-20 NOTE — PROGRESS NOTES
[facility] Medicine Services  PROGRESS NOTE    Patient Name: Juana Hall  : 1960  MRN: [id number]    Date of Admission: 10/13/2023  Primary Care Physician: Kiana Noriega ([place]), BETY    Subjective   Subjective     CC:  Follow-up multiple medical issues including hypotension    Subjective:  Patient reports she had another large bowel movement last night.  She says her nausea has improved since the NG tube has been placed back to suction.  She really is hoping that the surgery team will decide to remove her NG tube today.  She would like a popsicle if possible.  We reviewed her CT scan findings at length.    Review of Systems  No current fevers or chills  No current headache or lightheadedness  No current chest pain or palpitations      Objective   Objective     Vital Signs:   Temp:  [97.5 °F (36.4 °C)-97.9 °F (36.6 °C)] 97.9 °F (36.6 °C)  Heart Rate:  [91-98] 98  Resp:  [18] 18  BP: (107-113)/(45-56) 107/56        Physical Exam:  Constitutional:Awake, alert, chronically ill-appearing but currently nontoxic  HENT: NG tube is in place with bilious output, NCAT, mucous membranes moist, neck supple  Respiratory: Relatively clear, moderate inspiration, breathing currently nonlabored  Cardiovascular: Pulse rate is normal, normal radial pulses  Gastrointestinal: Severely obese soft, nontender, nondistended  Musculoskeletal: Severely chronically debilitated in appearance, moderate for lower extremity edema, BMI 60  Psychiatric: calm affect, cooperative, conversational  Neurologic: No slurred speech or facial droop, follows commands  Skin: No rashes or jaundice, warm      Results Reviewed:  Results from last 7 days   Lab Units 10/20/23  0534 10/19/23  0413 10/18/23  0416 10/14/23  0129 10/13/23  1259   WBC 10*3/mm3 7.93 8.92 6.41   < >  --    HEMOGLOBIN g/dL 8.4* 9.2* 8.4*   < >  --    HEMATOCRIT % 26.5* 28.7* 26.5*   < >  --    PLATELETS 10*3/mm3 161 157 116*   < >  --    PROCALCITONIN ng/mL  --   --   --    --  3.83*    < > = values in this interval not displayed.     Results from last 7 days   Lab Units 10/20/23  0534 10/19/23  0413 10/18/23  0416 10/15/23  0412 10/14/23  0129 10/13/23  1259   SODIUM mmol/L 140 140 139   < > 139  --    POTASSIUM mmol/L 3.5 3.8 3.6   < > 2.8*  --    CHLORIDE mmol/L 97* 100 99   < > 99  --    CO2 mmol/L 25.0 25.0 25.8   < > 27.0  --    BUN mg/dL 40* 27* 44*   < > 24*  --    CREATININE mg/dL 6.60* 4.94* 7.41*   < > 5.23*  --    GLUCOSE mg/dL 164* 124* 143*   < > 101*  --    CALCIUM mg/dL 8.5* 8.5* 7.5*   < > 8.7  --    ALK PHOS U/L  --   --   --   --  121*  --    ALT (SGPT) U/L  --   --   --   --  13  --    AST (SGOT) U/L  --   --   --   --  14  --    HSTROP T ng/L  --   --   --   --   --  51*    < > = values in this interval not displayed.     Estimated Creatinine Clearance: 14 mL/min (A) (by C-G formula based on SCr of 6.6 mg/dL (H)).    Microbiology Results Abnormal       Procedure Component Value - Date/Time    Blood Culture - Blood, Arm, Right [[id number]]  (Normal) Collected: 10/14/23 0143    Lab Status: Final result Specimen: Blood from Arm, Right Updated: 10/19/23 0201     Blood Culture No growth at 5 days    Blood Culture - Blood, Arm, Right [[id number]]  (Normal) Collected: 10/13/23 2251    Lab Status: Final result Specimen: Blood from Arm, Right Updated: 10/18/23 2331     Blood Culture No growth at 5 days    COVID PRE-OP / PRE-PROCEDURE SCREENING ORDER (NO ISOLATION) - Swab, Nasopharynx [[id number]]  (Normal) Collected: 10/13/23 1056    Lab Status: Final result Specimen: Swab from Nasopharynx Updated: 10/13/23 1302    Narrative:      The following orders were created for panel order COVID PRE-OP / PRE-PROCEDURE SCREENING ORDER (NO ISOLATION) - Swab, Nasopharynx.  Procedure                               Abnormality         Status                     ---------                               -----------         ------                     COVID-19, HAY IN-HOUSE...[[id number]]   Normal              Final result                 Please view results for these tests on the individual orders.    COVID-19,BH HAY IN-HOUSE CEPHEID/JF NP SWAB IN TRANSPORT MEDIA 8-12 HR TAT - Swab, Nasopharynx [[id number]]  (Normal) Collected: 10/13/23 1056    Lab Status: Final result Specimen: Swab from Nasopharynx Updated: 10/13/23 1302     COVID19 Not Detected    Narrative:      Fact sheet for providers: https://www.fda.gov/media/281167/download     Fact sheet for patients: https://www.fda.gov/media/037195/download            Imaging Results (Last 24 Hours)       Procedure Component Value Units Date/Time    CT Abdomen Pelvis With Contrast [[id number]] Collected: 10/19/23 1619     Updated: 10/20/23 0749    Narrative:      CT ABDOMEN AND PELVIS WITH IV CONTRAST     HISTORY: 63-year-old female with metastatic breast cancer. Abdominal  pain. Ultrasound-guided paracentesis removed 11 L of fluid on  10/09/2023.     TECHNIQUE: Radiation dose reduction techniques were utilized, including  automated exposure control and exposure modulation based on body size.   3 mm images were obtained through the abdomen and pelvis after the  administration of IV contrast. Compared with previous CT 09/28/2023.     FINDINGS:  1. There has been reaccumulation of a moderately large volume of ascites  throughout the abdomen and pelvis, slightly smaller in volume than  previously.     2. There is a significantly thickened appearance of the gastric antrum  which is contracted. Appearance is suggestive of antritis or possibly  metastatic disease to the gastric antrum. Duodenum is collapsed and  there is mild dilatation of a segment of jejunum, but there is no small  or large bowel obstruction. The colon is nearly completely collapsed.     3. No acute abnormality is seen at the liver, gallbladder, spleen,  pancreas, adrenals, or atrophied kidneys. Left ureteral stent remains in  place and there is no hydronephrosis. No change in the appearance  of the  uterus and adnexa.     4. There is a new very small left pleural effusion and new left lower  lobe atelectasis. There is no significant change in the appearance of  the T11 sclerotic metastasis.     This report was finalized on 10/20/2023 7:46 AM by Dr. Lety Alicea M.D  on Workstation: [id number]               Results for orders placed during the hospital encounter of 10/13/23    Adult Transthoracic Echo Complete W/ Cont if Necessary Per Protocol    Interpretation Summary    Left ventricular systolic function is normal. Calculated left ventricular EF = 58.4%    Left ventricular diastolic function was normal.    Severe aortic valve stenosis is present. Aortic valve area is 0.9 cm2.    Peak velocity of the flow distal to the aortic valve is 469.1 cm/s. Aortic valve maximum pressure gradient is 88 mmHg. Aortic valve mean pressure gradient is 48 mmHg. Aortic valve dimensionless index is 0.3 .    Estimated right ventricular systolic pressure from tricuspid regurgitation is moderately elevated (45-55 mmHg). Calculated right ventricular systolic pressure from tricuspid regurgitation is 46 mmHg.    There is mild plaque in the aortic arch present.      I have reviewed the medications:  Scheduled Meds:Abemaciclib, 100 mg, Oral, BID  atorvastatin, 40 mg, Oral, Nightly  diatrizoate meglumine-sodium, 30 mL, Oral, Once  epoetin jana/jana-epbx, 20,000 Units, Subcutaneous, Once per day on Mon Wed Fri  folic acid, 1 mg, Oral, Daily  gabapentin, 300 mg, Oral, Nightly  insulin lispro, 2-9 Units, Subcutaneous, Q4H  levothyroxine, 50 mcg, Oral, Q AM  midodrine, 5 mg, Oral, TID AC  multivitamin, 1 tablet, Oral, Daily  mupirocin, 1 application , Topical, Daily  polyethylene glycol, 17 g, Oral, Daily  potassium chloride, 20 mEq, Oral, Daily  senna-docusate sodium, 2 tablet, Oral, BID  sertraline, 150 mg, Oral, Daily  sodium chloride, 10 mL, Intravenous, Q12H  vitamin B-12, 1,000 mcg, Oral, Daily      Continuous Infusions:     PRN  Meds:.  acetaminophen **OR** acetaminophen **OR** acetaminophen    acetaminophen **OR** acetaminophen    acetaminophen    albumin human    senna-docusate sodium **AND** polyethylene glycol **AND** bisacodyl **AND** bisacodyl    dextrose    dextrose    diphenoxylate-atropine    fentaNYL citrate (PF)    glucagon (human recombinant)    HYDROcodone-acetaminophen    influenza vaccine    Menthol-Zinc Oxide    metoclopramide    nitroglycerin    ondansetron **OR** ondansetron    [COMPLETED] Insert Peripheral IV **AND** sodium chloride    sodium chloride    sodium chloride    sodium chloride    Assessment & Plan   Assessment & Plan     Active Hospital Problems    Diagnosis  POA    **Hypotension [I95.9]  Yes    Abnormal urinalysis [R82.90]  Unknown    Hypokalemia [E87.6]  Unknown    Diastolic CHF, chronic [I50.32]  Yes    ANDREW (obstructive sleep apnea) [G47.33]  Yes    Combined systolic and diastolic congestive heart failure [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        Brief Hospital Course to date:  Juana Hall is a 63 y.o. female with end-stage renal disease on hemodialysis presents the hospital with hypotension.  She was found to have an ileus in the setting of peritoneal metastasis.  She was found to have urinary tract infection at time of admission.  She was stabilized in the intensive care unit and transferred to the [facility] medicine.    Hypotension  Urinary tract infection  End-stage renal disease on hemodialysis 5 times weekly  Hypokalemia  Chronic diastolic and systolic heart failure  Breast cancer with radical  mastectomy and chemotherapy  Hypothyroidism  Diabetes mellitus  Obstructive sleep apnea on CPAP  Multiple falls  Fractures in left foot/ankle  Morbid obesity, very severe  Hypoglycemia  Severe aortic valve stenosis  Ileus status post NG tube placement 10/16  Metastasis to peritoneum  Malignant ascites    Discussion/plan for today:    Ileus:  NG tube on low wall suction.  CT images reviewed shows ascites and concern for carcinomatosis.  Having return of bowel function.  May try to clamp tube later today pending surgery recommendations.    Hypotension: Multifactorial etiology.  Patient has been on midodrine which was decreased to 5 mg.  Plan to give her an extra 5 mg for a total of 10 mg this morning today prior to dialysis.    Urinary tract infection and right lower lobe pneumonia: Antibiotics completed.    End-stage renal disease and CHF: Dialysis today.  Volume management with dialysis    Ascites: Likely malignant ascites.  Could be partially related to hypervolemia.  Volume management per dialysis.    Breast cancer:  Oncology following. Abemaciclib    Diabetes:  Monitor glucose daily.  Adjust medications as needed.    ANDREW: Per pulmonology.    Left foot fracture: Currently with protective shoe.  Continue PT.    Morbid obesity: Complicates all aspects of care.    Respiratory issues have stabilized.  Discussed with pulmonology signed off upon transfer out of the ICU.  Continue to monitor.    Echocardiogram reviewed and severe valvular disease and pulmonary hypertension noted.  Volume management with dialysis.  Plan for dialysis as per nephrology recommendations.    Platelets better.  Thrombocytopenia likely due to acute illness now resolved.      DVT Prophylaxis: SCDs added      Disposition: Pending clinical course    CODE STATUS:   Code Status and Medical Interventions:   Ordered at: 10/13/23 2658     Code Status (Patient has no pulse and is not breathing):    CPR (Attempt to Resuscitate)     Medical Interventions  (Patient has pulse or is breathing):    Full Support       Zoltan San MD  10/20/23

## 2023-10-20 NOTE — PROGRESS NOTES
Ventricular rate reasonably controlled.  No additional cardiac work-up indicated.    Unfortunately she does have severe degenerative aortic valve stenosis and is not a candidate for transcatheter aortic valve replacement    I agree with the palliative care consultation that was placed a couple days ago.  Patient is ready to go down that path yet  I will sign off

## 2023-10-20 NOTE — PLAN OF CARE
Goal Outcome Evaluation:  Plan of Care Reviewed With: patient, spouse           Outcome Evaluation: Pt agreed to PT session, pt worked on sitting EOB>20 min, pt perf LAQs x10 w/rest breaks to complete, limited endurance, had to rest on L elbow x4 during sit time, required assist of 2 for mobility but pt was participating, jayden bed and mattress making it difficult for pt to assist more, did also use trapeze to assist w/bed mobility, pt motivated , plans SNU at OR and hopes to try standing next session      Anticipated Discharge Disposition (PT): skilled nursing facility

## 2023-10-20 NOTE — CASE MANAGEMENT/SOCIAL WORK
Continued Stay Note  [facility]     Patient Name: Juana Hall  MRN: [id number]  Today's Date: 10/20/2023    Admit Date: 10/13/2023    Plan: SNF vs home, will need ambulance transport   Discharge Plan       Row Name 10/20/23 1351       Plan    Plan SNF vs home, will need ambulance transport    Plan Comments Met with pt briefly at bedside. Informed pt that referral has been placed to [facility] as per request. Once medically stable, CCP will arrange for discharge, pre-certs, dialysis, etc. Explained that CCP will continue to monitor and assess pt record.                   Discharge Codes    No documentation.                 Expected Discharge Date and Time       Expected Discharge Date Expected Discharge Time    Oct 24, 2023               Anjali Cerrato RN

## 2023-10-20 NOTE — PLAN OF CARE
Goal Outcome Evaluation:           Progress: no change  Outcome Evaluation: VSS; no complaints of pain or discomfort; tried to clamp pt's NGT for 6 hours but was unable to d/t the pt feeling nauseated in dialysis; dialysis stopped short; nephrology notified from the dialysis department; pt is to get dialysis tomorrow; pt's heart rate also elevated to the 130s; cardiology aware; monitor pt and call back if heart rate elevates higher; safety maintained; continue to monitor

## 2023-10-20 NOTE — PROGRESS NOTES
IMPRESSION & PLAN:  Ms. Rivera is a 63-year-old lady with metastatic breast cancer, likely peritoneal carcinomatosis with malignant ascites, and ileus versus small bowel obstruction.  CT scan yesterday showing thickening of the antrum of the stomach.  Start Protonix.  NG tube output remains dark and bilious in appearance.  She would like to try a clamping trial today to see if the NG tube is ready to be removed.  We will clamp it for 6 hours and if output is less than 200 cc when hooked back up to suction then we will remove it.    CC:    Chief Complaint   Patient presents with    Weakness - Generalized         HPI: She reports she had another bowel movement and no abdominal pain.    ROS:   Constitutional: No fever, no chills  CV:  No chest pain. No palpitations.   Lungs:  No shortness of breath or cough.   GI:   No nausea, no vomiting, no diarrhea, no constipation, no anorexia.       PE:    VS:   Vitals:    10/20/23 1315   BP: 95/61   Pulse: (!) 135   Resp:    Temp:    SpO2:       CONST: Awake, alert  LUNGS: symmetric excursion, normal inspiratory effort  CV: regular rate, well perfused  Abdomen: Soft, obese, nontender    LABS:  CBC          10/18/2023    04:16 10/19/2023    04:13 10/20/2023    05:34   CBC   WBC 6.41  8.92  7.93    RBC 2.73  2.93  2.68    Hemoglobin 8.4  9.2  8.4    Hematocrit 26.5  28.7  26.5    MCV 97.1  98.0  98.9    MCH 30.8  31.4  31.3    MCHC 31.7  32.1  31.7    RDW 15.5  15.6  15.5    Platelets 116  157  161      BMP          10/18/2023    04:16 10/19/2023    04:13 10/20/2023    05:34   BMP   BUN 44  27  40    Creatinine 7.41  4.94  6.60    Sodium 139  140  140    Potassium 3.6  3.8  3.5    Chloride 99  100  97    CO2 25.8  25.0  25.0    Calcium 7.5  8.5  8.5          RADIOLOGY:  CT abdomen pelvis reviewed showing thickening of the antrum and decompression of the bowel distal to the stomach.  Oral contrast did traverse the thickened area of the antrum.

## 2023-10-20 NOTE — PLAN OF CARE
Goal Outcome Evaluation:              Outcome Evaluation: VSS. RESTED WELL OVERNIGHT WITHOUT C/O'S. REMAINS TO LOW WALL SUCTION WITH LARGE OUTPUT, IS TAKING OCCASSIONAL ICE CHIPS.

## 2023-10-20 NOTE — NURSING NOTE
HD tx stopped after 90 minutes per pt request .  MD HICKEY. Daily aware    B/p 112/71  hr 131  temp 98.1  Zero UF removed

## 2023-10-21 LAB
ANION GAP SERPL CALCULATED.3IONS-SCNC: 14.6 MMOL/L (ref 5–15)
ANISOCYTOSIS BLD QL: ABNORMAL
BUN SERPL-MCNC: 33 MG/DL (ref 8–23)
BUN/CREAT SERPL: 5.8 (ref 7–25)
CALCIUM SPEC-SCNC: 8.2 MG/DL (ref 8.6–10.5)
CHLORIDE SERPL-SCNC: 99 MMOL/L (ref 98–107)
CO2 SERPL-SCNC: 25.4 MMOL/L (ref 22–29)
CREAT SERPL-MCNC: 5.73 MG/DL (ref 0.57–1)
DEPRECATED RDW RBC AUTO: 57.4 FL (ref 37–54)
EGFRCR SERPLBLD CKD-EPI 2021: 7.7 ML/MIN/1.73
EOSINOPHIL # BLD MANUAL: 0.08 10*3/MM3 (ref 0–0.4)
EOSINOPHIL NFR BLD MANUAL: 1 % (ref 0.3–6.2)
ERYTHROCYTE [DISTWIDTH] IN BLOOD BY AUTOMATED COUNT: 15.7 % (ref 12.3–15.4)
GLUCOSE BLDC GLUCOMTR-MCNC: 106 MG/DL (ref 70–130)
GLUCOSE BLDC GLUCOMTR-MCNC: 118 MG/DL (ref 70–130)
GLUCOSE BLDC GLUCOMTR-MCNC: 125 MG/DL (ref 70–130)
GLUCOSE BLDC GLUCOMTR-MCNC: 174 MG/DL (ref 70–130)
GLUCOSE SERPL-MCNC: 153 MG/DL (ref 65–99)
HBV CORE AB SERPL QL IA: NEGATIVE
HCT VFR BLD AUTO: 28 % (ref 34–46.6)
HGB BLD-MCNC: 8.9 G/DL (ref 12–15.9)
LYMPHOCYTES # BLD MANUAL: 0.74 10*3/MM3 (ref 0.7–3.1)
LYMPHOCYTES NFR BLD MANUAL: 6 % (ref 5–12)
MACROCYTES BLD QL SMEAR: ABNORMAL
MCH RBC QN AUTO: 31.9 PG (ref 26.6–33)
MCHC RBC AUTO-ENTMCNC: 31.8 G/DL (ref 31.5–35.7)
MCV RBC AUTO: 100.4 FL (ref 79–97)
MONOCYTES # BLD: 0.49 10*3/MM3 (ref 0.1–0.9)
NEUTROPHILS # BLD AUTO: 6.87 10*3/MM3 (ref 1.7–7)
NEUTROPHILS NFR BLD MANUAL: 84 % (ref 42.7–76)
NRBC BLD AUTO-RTO: 0.7 /100 WBC (ref 0–0.2)
OVALOCYTES BLD QL SMEAR: ABNORMAL
PLAT MORPH BLD: NORMAL
PLATELET # BLD AUTO: 163 10*3/MM3 (ref 140–450)
PMV BLD AUTO: 10.5 FL (ref 6–12)
POIKILOCYTOSIS BLD QL SMEAR: ABNORMAL
POLYCHROMASIA BLD QL SMEAR: ABNORMAL
POTASSIUM SERPL-SCNC: 4 MMOL/L (ref 3.5–5.2)
RBC # BLD AUTO: 2.79 10*6/MM3 (ref 3.77–5.28)
SODIUM SERPL-SCNC: 139 MMOL/L (ref 136–145)
VARIANT LYMPHS NFR BLD MANUAL: 9 % (ref 19.6–45.3)
WBC MORPH BLD: NORMAL
WBC NRBC COR # BLD: 8.18 10*3/MM3 (ref 3.4–10.8)

## 2023-10-21 PROCEDURE — 63710000001 INSULIN LISPRO (HUMAN) PER 5 UNITS: Performed by: INTERNAL MEDICINE

## 2023-10-21 PROCEDURE — 85007 BL SMEAR W/DIFF WBC COUNT: CPT | Performed by: HOSPITALIST

## 2023-10-21 PROCEDURE — 80048 BASIC METABOLIC PNL TOTAL CA: CPT | Performed by: INTERNAL MEDICINE

## 2023-10-21 PROCEDURE — 25810000003 SODIUM CHLORIDE 0.9 % SOLUTION: Performed by: INTERNAL MEDICINE

## 2023-10-21 PROCEDURE — 25010000002 ONDANSETRON PER 1 MG: Performed by: HOSPITALIST

## 2023-10-21 PROCEDURE — 82948 REAGENT STRIP/BLOOD GLUCOSE: CPT

## 2023-10-21 PROCEDURE — 99231 SBSQ HOSP IP/OBS SF/LOW 25: CPT | Performed by: INTERNAL MEDICINE

## 2023-10-21 PROCEDURE — 85025 COMPLETE CBC W/AUTO DIFF WBC: CPT | Performed by: HOSPITALIST

## 2023-10-21 PROCEDURE — 99231 SBSQ HOSP IP/OBS SF/LOW 25: CPT | Performed by: SURGERY

## 2023-10-21 RX ADMIN — PANTOPRAZOLE SODIUM 40 MG: 40 INJECTION, POWDER, FOR SOLUTION INTRAVENOUS at 08:19

## 2023-10-21 RX ADMIN — FOLIC ACID 1 MG: 1 TABLET ORAL at 08:20

## 2023-10-21 RX ADMIN — INSULIN LISPRO 2 UNITS: 100 INJECTION, SOLUTION INTRAVENOUS; SUBCUTANEOUS at 08:20

## 2023-10-21 RX ADMIN — MIDODRINE HYDROCHLORIDE 5 MG: 5 TABLET ORAL at 18:00

## 2023-10-21 RX ADMIN — SODIUM CHLORIDE 500 ML: 9 INJECTION, SOLUTION INTRAVENOUS at 14:14

## 2023-10-21 RX ADMIN — ATORVASTATIN CALCIUM 40 MG: 20 TABLET, FILM COATED ORAL at 21:15

## 2023-10-21 RX ADMIN — SENNOSIDES AND DOCUSATE SODIUM 2 TABLET: 50; 8.6 TABLET ORAL at 08:19

## 2023-10-21 RX ADMIN — MIDODRINE HYDROCHLORIDE 5 MG: 5 TABLET ORAL at 14:19

## 2023-10-21 RX ADMIN — Medication 10 ML: at 21:16

## 2023-10-21 RX ADMIN — Medication 10 ML: at 08:21

## 2023-10-21 RX ADMIN — POTASSIUM CHLORIDE 20 MEQ: 750 TABLET, EXTENDED RELEASE ORAL at 08:21

## 2023-10-21 RX ADMIN — LEVOTHYROXINE SODIUM 50 MCG: 50 TABLET ORAL at 07:27

## 2023-10-21 RX ADMIN — ONDANSETRON 8 MG: 2 INJECTION INTRAMUSCULAR; INTRAVENOUS at 09:37

## 2023-10-21 RX ADMIN — Medication 1000 MCG: at 08:19

## 2023-10-21 RX ADMIN — MUPIROCIN 1 APPLICATION: 20 OINTMENT TOPICAL at 08:21

## 2023-10-21 RX ADMIN — GABAPENTIN 300 MG: 300 CAPSULE ORAL at 21:15

## 2023-10-21 RX ADMIN — Medication 1 TABLET: at 08:19

## 2023-10-21 RX ADMIN — POLYETHYLENE GLYCOL 3350 17 G: 17 POWDER, FOR SOLUTION ORAL at 08:19

## 2023-10-21 RX ADMIN — MIDODRINE HYDROCHLORIDE 5 MG: 5 TABLET ORAL at 07:27

## 2023-10-21 RX ADMIN — SERTRALINE 150 MG: 100 TABLET, FILM COATED ORAL at 08:19

## 2023-10-21 NOTE — PROGRESS NOTES
Name: Juana Hall ADMIT: 10/13/2023   : 1960  PCP: Kiana Noriega APRN ([place])    MRN: [id number] LOS: 7 days   AGE/SEX: 63 y.o. female  ROOM: [facility]     Subjective   Subjective     No events overnight. She had dialysis today. Still having a lot of output from her NG. She reports a bowel movement this morning and flatus. She states that she has had about 4 bms over the last 3 days or so       Objective   Objective   Vital Signs  Temp:  [96.6 °F (35.9 °C)-97.7 °F (36.5 °C)] 97.7 °F (36.5 °C)  Heart Rate:  [] 79  Resp:  [16-18] 18  BP: ()/(29-61) 103/29  SpO2:  [91 %-100 %] 100 %  on   ;   Device (Oxygen Therapy): room air  Body mass index is 56.49 kg/m².  Physical Exam  Constitutional:       General: She is not in acute distress.     Appearance: She is obese. She is ill-appearing.   Cardiovascular:      Rate and Rhythm: Normal rate and regular rhythm.      Heart sounds: Murmur heard.   Pulmonary:      Effort: Pulmonary effort is normal.      Breath sounds: Normal breath sounds.   Abdominal:      General: There is no distension.      Palpations: Abdomen is soft.      Tenderness: There is no abdominal tenderness. There is no guarding or rebound.   Musculoskeletal:         General: Deformity present.   Neurological:      Mental Status: She is alert.   Psychiatric:         Mood and Affect: Mood normal.         Behavior: Behavior normal.         Results Review     I reviewed the patient's new clinical results.  Results from last 7 days   Lab Units 10/21/23  0353 10/20/23  0534 10/19/23  0413 10/18/23  0416   WBC 10*3/mm3 8.18 7.93 8.92 6.41   HEMOGLOBIN g/dL 8.9* 8.4* 9.2* 8.4*   PLATELETS 10*3/mm3 163 161 157 116*     Results from last 7 days   Lab Units 10/21/23  0353 10/20/23  0534 10/19/23  0413 10/18/23  0416   SODIUM mmol/L 139 140 140 139   POTASSIUM mmol/L 4.0 3.5 3.8 3.6   CHLORIDE mmol/L 99 97* 100 99   CO2 mmol/L 25.4 25.0 25.0 25.8   BUN mg/dL 33* 40* 27* 44*   CREATININE mg/dL  5.73* 6.60* 4.94* 7.41*   GLUCOSE mg/dL 153* 164* 124* 143*   Estimated Creatinine Clearance: 15.9 mL/min (A) (by C-G formula based on SCr of 5.73 mg/dL (H)).    Results from last 7 days   Lab Units 10/21/23  0353 10/20/23  0534 10/19/23  0413 10/18/23  0416   CALCIUM mg/dL 8.2* 8.5* 8.5* 7.5*       COVID19   Date Value Ref Range Status   10/13/2023 Not Detected Not Detected - Ref. Range Final   09/26/2023 Not Detected Not Detected - Ref. Range Final   01/04/2023 Not Detected Not Detected - Ref. Range Final   08/20/2022 Not Detected Not Detected - Ref. Range Final   03/08/2022 Not Detected Not Detected - Ref. Range Final     Glucose   Date/Time Value Ref Range Status   10/21/2023 1329 106 70 - 130 mg/dL Final   10/21/2023 0757 174 (H) 70 - 130 mg/dL Final   10/20/2023 2050 128 70 - 130 mg/dL Final   10/20/2023 1726 133 (H) 70 - 130 mg/dL Final   10/20/2023 1215 148 (H) 70 - 130 mg/dL Final   10/20/2023 0826 161 (H) 70 - 130 mg/dL Final   10/19/2023 2207 149 (H) 70 - 130 mg/dL Final       CT Abdomen Pelvis With Contrast  CT ABDOMEN AND PELVIS WITH IV CONTRAST     HISTORY: 63-year-old female with metastatic breast cancer. Abdominal  pain. Ultrasound-guided paracentesis removed 11 L of fluid on  10/09/2023.     TECHNIQUE: Radiation dose reduction techniques were utilized, including  automated exposure control and exposure modulation based on body size.   3 mm images were obtained through the abdomen and pelvis after the  administration of IV contrast. Compared with previous CT 09/28/2023.     FINDINGS:  1. There has been reaccumulation of a moderately large volume of ascites  throughout the abdomen and pelvis, slightly smaller in volume than  previously.     2. There is a significantly thickened appearance of the gastric antrum  which is contracted. Appearance is suggestive of antritis or possibly  metastatic disease to the gastric antrum. Duodenum is collapsed and  there is mild dilatation of a segment of jejunum, but  there is no small  or large bowel obstruction. The colon is nearly completely collapsed.     3. No acute abnormality is seen at the liver, gallbladder, spleen,  pancreas, adrenals, or atrophied kidneys. Left ureteral stent remains in  place and there is no hydronephrosis. No change in the appearance of the  uterus and adnexa.     4. There is a new very small left pleural effusion and new left lower  lobe atelectasis. There is no significant change in the appearance of  the T11 sclerotic metastasis.     This report was finalized on 10/20/2023 7:46 AM by Dr. Lety Alicea M.D  on Workstation: [id number]       Scheduled Medications  Abemaciclib, 100 mg, Oral, BID  albumin human, 12.5 g, Intravenous, Once  atorvastatin, 40 mg, Oral, Nightly  diatrizoate meglumine-sodium, 30 mL, Oral, Once  epoetin jana/jana-epbx, 20,000 Units, Subcutaneous, Once per day on Mon Wed Fri  folic acid, 1 mg, Oral, Daily  gabapentin, 300 mg, Oral, Nightly  insulin lispro, 2-9 Units, Subcutaneous, Q4H  levothyroxine, 50 mcg, Oral, Q AM  midodrine, 5 mg, Oral, TID AC  multivitamin, 1 tablet, Oral, Daily  mupirocin, 1 application , Topical, Daily  pantoprazole, 40 mg, Intravenous, Q AM  polyethylene glycol, 17 g, Oral, Daily  potassium chloride, 20 mEq, Oral, Daily  senna-docusate sodium, 2 tablet, Oral, BID  sertraline, 150 mg, Oral, Daily  sodium chloride, 10 mL, Intravenous, Q12H  vitamin B-12, 1,000 mcg, Oral, Daily    Infusions   Diet  NPO Diet NPO Type: Sips with Meds, Ice Chips, Other (see comments)       Assessment/Plan     Active Hospital Problems    Diagnosis  POA    **Hypotension [I95.9]  Yes    Abnormal urinalysis [R82.90]  Unknown    Hypokalemia [E87.6]  Unknown    Diastolic CHF, chronic [I50.32]  Yes    ANDREW (obstructive sleep apnea) [G47.33]  Yes    Combined systolic and diastolic congestive heart failure [I50.40]  Yes    ESRD (end stage renal disease) [N18.6]  Yes    Morbid obesity with BMI of 50.0-59.9, adult [E66.01, Z68.43]   Not Applicable    Anemia in stage 3 chronic kidney disease [N18.30, D63.1]  Yes    Primary malignant neoplasm of breast with metastasis [C50.919]  Yes    Acquired hypothyroidism [E03.9]  Yes    Diabetes type 2, controlled [E11.9]  Yes      Resolved Hospital Problems   No resolved problems to display.       63 y.o. female admitted with Hypotension.    63 year old woman who presented with hypotension after a large volume paracentesis during her prior admission.  She was found to have pneumonia and a UTI.  Shortly after admission, she was transferred to the ICU due to refractory hypotension.  Her course has been complicated by an ileus versus small bowel obstruction.    Hypotension-multifactorial due to infection, large-volume paracentesis, ESRD, aortic valve stenosis.  On midodrine  Pneumonia/UTI-completed course of antibiotics while in the ICU.  Ileus versus small bowel obstruction-NG tube in place with gastroenterology managing.  She is having some bowel function.  ESRD-on home hemodialysis 5 days a week at home.  Nephrology managing here.  Type 2 diabetes-glucose at goal  Aortic valve stenosis-not a candidate for intervention  Chronic diastolic heart failure-volume managed with dialysis  Paroxysmal A-fib-blood pressure too low to tolerate beta-blockade.  Not on anticoagulation due to anemia.  Anemia of CKD-on EPO injections  Hypothyroidism-Synthroid  Stage IV breast cancer with malignant ascites-on abemaciclib and faslodex as an outpatient  Thickened appearance of gastric antrum on CT-on IV PPI, though this could also be evidence of metastatic disease.  ANDREW-PAP if available  SCDs for DVT prophylaxis.  Full code.  Discussed with patient, family, and nursing staff.  Anticipate discharge  TBD  timing yet to be determined.      Nicolás Rose MD  [facility]  10/21/23  14:02 EDT    I wore protective equipment throughout this patient encounter including a face mask, gloves and protective eyewear.   Hand hygiene was performed before donning protective equipment and after removal when leaving the room.

## 2023-10-21 NOTE — PROGRESS NOTES
"   LOS: 7 days     Chief Complaint/ Reason for encounter: ESRD management    Subjective   10/19/23 : She feels a little better today  Still with frequent bowel movements but less diarrhea  BP remains low normal, on midodrine  NG tube remains in place, clamped  She remains n.p.o. with occasional nausea    10/20: NG tube remains in place, still with copious amounts of NG output  Not as much diarrhea, no abdominal pain  Scheduled for dialysis today.  Weights very labile and do not seem accurate to me    10/21: She complained of abdominal pain and nausea and only received an hour and a half of her dialysis treatment.  I tried to convince her to complete another hour but patient refused  Scheduled for additional dialysis today  NG tube currently clamped    Medical history reviewed:  History of Present Illness    Subjective    History taken from: Patient and chart    Vital Signs  Temp:  [97 °F (36.1 °C)-97.7 °F (36.5 °C)] 97.7 °F (36.5 °C)  Heart Rate:  [] 89  Resp:  [16-18] 16  BP: ()/(40-61) 92/40       Wt Readings from Last 1 Encounters:   10/21/23 0527 (!) 164 kg (360 lb 11.2 oz)   10/20/23 0457 (!) 167 kg (369 lb 1.6 oz)   10/19/23 0500 (!) 162 kg (356 lb 11.2 oz)   10/18/23 0510 (!) 166 kg (365 lb)   10/17/23 0537 (!) 168 kg (371 lb 1.6 oz)   10/14/23 1634 (!) 176 kg (387 lb)   10/14/23 0000 (!) 176 kg (387 lb 6.4 oz)   10/13/23 1251 (!) 154 kg (340 lb)       Objective:  Vital signs: (most recent): Blood pressure 92/40, pulse 89, temperature 97.7 °F (36.5 °C), temperature source Oral, resp. rate 16, height 170.2 cm (67\"), weight (!) 164 kg (360 lb 11.2 oz), SpO2 91%, not currently breastfeeding.                Objective:  General Appearance:  Comfortable, obese, ill-appearing, in no acute distress and not in pain.  Awake, alert, oriented  HEENT: Mucous membranes moist, no injury, oropharynx clear, NG tube in place with high output  Lungs:  Normal effort and normal respiratory rate.  Breath sounds clear " to auscultation.  No  respiratory distress.  No rales, decreased breath sounds or rhonchi.    Heart: Normal rate.  Regular rhythm.  S1, S2 normal.  No murmur.   Abdomen: Abdomen is soft.  Bowel sounds are normal, no abdominal tenderness.  There is no rebound or guarding  Extremities: Trace edema of bilateral lower extremities  Neurological: No focal motor or sensory deficits, pupils reactive  Skin:  Warm and dry.  No rash or cyanosis.       Results Review:    Intake/Output:     Intake/Output Summary (Last 24 hours) at 10/21/2023 1028  Last data filed at 10/20/2023 2315  Gross per 24 hour   Intake 0 ml   Output 1350 ml   Net -1350 ml         DATA:  Radiology and Labs:  The following labs independently reviewed by me. Additional labs ordered for tomorrow a.m.  Interval notes, chart personally reviewed by me.   Old records independently reviewed showing ESRD on dialysis  Discussed with patient    Risk/ complexity of medical care/ medical decision making moderate, dialysis management    Labs:   Recent Results (from the past 24 hour(s))   POC Glucose Once    Collection Time: 10/20/23 12:15 PM    Specimen: Blood   Result Value Ref Range    Glucose 148 (H) 70 - 130 mg/dL   POC Glucose Once    Collection Time: 10/20/23  5:26 PM    Specimen: Blood   Result Value Ref Range    Glucose 133 (H) 70 - 130 mg/dL   POC Glucose Once    Collection Time: 10/20/23  8:50 PM    Specimen: Blood   Result Value Ref Range    Glucose 128 70 - 130 mg/dL   Basic Metabolic Panel    Collection Time: 10/21/23  3:53 AM    Specimen: Blood   Result Value Ref Range    Glucose 153 (H) 65 - 99 mg/dL    BUN 33 (H) 8 - 23 mg/dL    Creatinine 5.73 (H) 0.57 - 1.00 mg/dL    Sodium 139 136 - 145 mmol/L    Potassium 4.0 3.5 - 5.2 mmol/L    Chloride 99 98 - 107 mmol/L    CO2 25.4 22.0 - 29.0 mmol/L    Calcium 8.2 (L) 8.6 - 10.5 mg/dL    BUN/Creatinine Ratio 5.8 (L) 7.0 - 25.0    Anion Gap 14.6 5.0 - 15.0 mmol/L    eGFR 7.7 (L) >60.0 mL/min/1.73   CBC Auto  Differential    Collection Time: 10/21/23  3:53 AM    Specimen: Blood   Result Value Ref Range    WBC 8.18 3.40 - 10.80 10*3/mm3    RBC 2.79 (L) 3.77 - 5.28 10*6/mm3    Hemoglobin 8.9 (L) 12.0 - 15.9 g/dL    Hematocrit 28.0 (L) 34.0 - 46.6 %    .4 (H) 79.0 - 97.0 fL    MCH 31.9 26.6 - 33.0 pg    MCHC 31.8 31.5 - 35.7 g/dL    RDW 15.7 (H) 12.3 - 15.4 %    RDW-SD 57.4 (H) 37.0 - 54.0 fl    MPV 10.5 6.0 - 12.0 fL    Platelets 163 140 - 450 10*3/mm3    nRBC 0.7 (H) 0.0 - 0.2 /100 WBC   Manual Differential    Collection Time: 10/21/23  3:53 AM    Specimen: Blood   Result Value Ref Range    Neutrophil % 84.0 (H) 42.7 - 76.0 %    Lymphocyte % 9.0 (L) 19.6 - 45.3 %    Monocyte % 6.0 5.0 - 12.0 %    Eosinophil % 1.0 0.3 - 6.2 %    Neutrophils Absolute 6.87 1.70 - 7.00 10*3/mm3    Lymphocytes Absolute 0.74 0.70 - 3.10 10*3/mm3    Monocytes Absolute 0.49 0.10 - 0.90 10*3/mm3    Eosinophils Absolute 0.08 0.00 - 0.40 10*3/mm3    Anisocytosis Mod/2+ None Seen    Macrocytes Mod/2+ None Seen    Ovalocytes Mod/2+ None Seen    Poikilocytes Mod/2+ None Seen    Polychromasia Slight/1+ None Seen    WBC Morphology Normal Normal    Platelet Morphology Normal Normal   POC Glucose Once    Collection Time: 10/21/23  7:57 AM    Specimen: Blood   Result Value Ref Range    Glucose 174 (H) 70 - 130 mg/dL       Radiology:  Pertinent radiology studies were reviewed as described above      Medications have been reviewed separately in chart overview      ASSESSMENT:  ESRD on dialysis, Monday Wednesday Friday.  Normally on HHD 5 days a week at home  Metastatic breast cancer  Obesity  Recurrent ascites  Chronic anemia from renal failure and immunotherapy  Hypokalemia  Nausea, vomiting and diarrhea  Chronic hypotension on midodrine  Type 2 diabetes mellitus  Hypothyroidism    Diabetes type 2, controlled    Combined systolic and diastolic congestive heart failure    ANDREW (obstructive sleep apnea)      Plan:   Patient only completed 1 and half  hours of dialysis yesterday  Reattempt HD today then Tuesday Thursday Saturday schedule next week  Minimal UF due to low oral intake.  4K bath  Midodrine for BP support  IV albumin with dialysis as needed for intradialytic hypotension  Epogen with dialysis for anemia.  Iron stores appear adequate, consistent with anemia of CKD    Immunotherapy per oncology  Will follow     Tai Antonio MD  [facility]   Office phone number: [phone]  Answering service phone number: [phone]    10/21/23  10:28 EDT    Dictation performed using Dragon dictation software

## 2023-10-21 NOTE — PLAN OF CARE
Goal Outcome Evaluation:              Outcome Evaluation: VSS. N/G TUBE HAS REMAINED TO LWS. HAS BEEN ABLE TO TAKE OCC. ICE CHIPS WITHOUT NAUSEA.

## 2023-10-21 NOTE — NURSING NOTE
HD WITHOUT INCIDENT OR C/O. TOLERATED WELL. REMOVED 3.4 L. RECEIVED ALBUMIN AND MIDODRINE FOR BP SUPPORT. RETACRIT AS ORDERED. DRSG CHANGED, CDI. STABLE, NO C/O UPON COMPLETION OF HD.

## 2023-10-21 NOTE — PROGRESS NOTES
IMPRESSION & PLAN:  Ms. Rivera is a 63-year-old lady with metastatic breast cancer, likely peritoneal carcinomatosis with malignant ascites, and ileus versus small bowel obstruction.  CT with thickening of the antrum of the stomach.  Protonix started.  NG tube output high overnight but she has had a lot of ice chips.  She denies nausea while clamped in dialysis.  Clamping trial again today after returning from dialysis.  If output less than 200 cc then we will plan to remove.  If unable to remove and high output continues then recommend EGD for evaluation of area of thickening of the antrum to determine if this is inflammatory in nature or secondary to metastatic disease.    CC:    Chief Complaint   Patient presents with    Weakness - Generalized         HPI: She continues to have bowel movements and has no current pain.  Experienced nausea during dialysis yesterday and dialysis had to be stopped.  I saw her back in dialysis today.    ROS:   Constitutional: No fever, no chills  CV:  No chest pain. No palpitations.   Lungs:  No shortness of breath or cough.   GI:   Denies nausea today.  Per HPI.      PE:    VS:   Vitals:    10/21/23 1405   BP: (!) 88/46   Pulse:    Resp:    Temp:    SpO2:       CONST: Awake, alert, NG tube in nare with clear fluid in tubing while clamped  LUNGS: symmetric excursion, normal inspiratory effort  CV: regular rate, well perfused  Abdomen: Soft, obese, nontender    LABS:  CBC          10/19/2023    04:13 10/20/2023    05:34 10/21/2023    03:53   CBC   WBC 8.92  7.93  8.18    RBC 2.93  2.68  2.79    Hemoglobin 9.2  8.4  8.9    Hematocrit 28.7  26.5  28.0    MCV 98.0  98.9  100.4    MCH 31.4  31.3  31.9    MCHC 32.1  31.7  31.8    RDW 15.6  15.5  15.7    Platelets 157  161  163      BMP          10/19/2023    04:13 10/20/2023    05:34 10/21/2023    03:53   BMP   BUN 27  40  33    Creatinine 4.94  6.60  5.73    Sodium 140  140  139    Potassium 3.8  3.5  4.0    Chloride 100  97  99    CO2  25.0  25.0  25.4    Calcium 8.5  8.5  8.2          RADIOLOGY:  CT abdomen pelvis reviewed showing thickening of the antrum and decompression of the bowel distal to the stomach.  Oral contrast did traverse the thickened area of the antrum.

## 2023-10-21 NOTE — PLAN OF CARE
Goal Outcome Evaluation:  Plan of Care Reviewed With: patient        Progress: improving  Outcome Evaluation: Pt with tube clamped since about 1400 this shift. Tolerating meds well through tube. No bm this shift but pt reports passing gas. Tube to remain clamped until 2000 and then will determine if volume greater than 200, tube will be hooked back to intermittent suction. IV zofran given x1 in dialysis. VSS. Safety maintained.

## 2023-10-21 NOTE — PROGRESS NOTES
REASON FOR FOLLOW-UP: Metastatic breast cancer    Interval history:  Patient seen today during her hemodialysis session.  Her NG is clamped until 6 PM.  She has not had nausea/emesis since clamping her CT.    October 21, 2023: Cardiology on case.  Patient has severe degenerative aortic valve stenosis and not a candidate for transcatheter aortic valve replacement.  Considering palliative care  Patient had been on Faslodex and abemaciclib 100 mg twice daily followed by Dr. Irvin.  She had PET scan scheduled end of the month.  Concern is if the GI issues are secondary to malignant obstruction.  CA 15-3 was normal at 21.3.  Patient has peritoneal carcinomatosis paracentesis had shown atypical cells suspicious for lobular carcinoma ER/TX negative.  Patient also on end-stage renal disease on hemodialysis    HISTORY OF PRESENT ILLNESS:   This is a 63-year-old woman followed by Dr. Irvin in our practice for metastatic lobular breast cancer which is ER/TX positive on treatment with Faslodex 500 mg IM every 4 weeks and abemaciclib 100 mg twice daily as of her most recent office visit 9/6/2023.  The patient also has anemia of end-stage renal disease/malignancy/treatment related.  Comorbidities include end-stage renal disease on hemodialysis, CHF/aortic stenosis.     The patient was recently admitted for intractable nausea vomiting diarrhea noted to have significant ascites for which she underwent a paracentesis.  Stool was positive for C. difficile toxin but negative for C. difficile antigen consistent with carrier state.  After her paracentesis of 11 L she became progressively weak and dizzy.  She called EMS and was brought to the ER found to be significantly hypotensive treated with fluid boluses but remained hypotensive requiring transfer to the ICU.  Patient also diagnosed with urinary tract infection and ileus (she was using a lot of Imodium at home for diarrhea) requiring NG tube.  She is currently on Rocephin      Past  Medical History, Past Surgical History, Social History, Family History have been reviewed and are without significant changes except as mentioned.    Review of Systems   Constitutional:  Positive for activity change and fatigue.   Respiratory:  Negative for shortness of breath.    Gastrointestinal:  Negative for abdominal pain, nausea and vomiting.   Genitourinary:  Positive for difficulty urinating.   Musculoskeletal:  Positive for gait problem.   Neurological:  Positive for weakness. Negative for light-headedness.   Hematological:  Does not bruise/bleed easily.        Medications:  The current medication list was reviewed in the EMR    ALLERGIES:  No Known Allergies    Objective      Vitals:    10/21/23 0753   BP: 92/40   Pulse:    Resp: 16   Temp: 97.7 °F (36.5 °C)   SpO2:           Physical Exam    CONSTITUTIONAL: pleasant well-developed/obese adult woman in no acute distress seen on dialysis  HEENT: no icterus, no thrush, moist membranes, NG tube in place but clamped  LYMPH: no cervical or supraclavicular lad  CV: Tachycardic S1S2, no murmur  RESP: cta bilat, no wheezing, no rales  GI: soft, non-tender, no splenomegaly, +bs, obese  MUSC: 1+ dependent edema  NEURO: alert and oriented x3, mild global weakness  PSYCH: normal mood and affect    RECENT LABS:  Hematology Results from last 7 days   Lab Units 10/21/23  0353 10/20/23  0534 10/19/23  0413   WBC 10*3/mm3 8.18 7.93 8.92   HEMOGLOBIN g/dL 8.9* 8.4* 9.2*   HEMATOCRIT % 28.0* 26.5* 28.7*   PLATELETS 10*3/mm3 163 161 157     2  Lab Results   Component Value Date    GLUCOSE 153 (H) 10/21/2023    BUN 33 (H) 10/21/2023    CREATININE 5.73 (H) 10/21/2023    EGFRIFNONA 11 (L) 02/16/2022    EGFRIFAFRI  01/29/2022      Comment:      <15 Indicative of kidney failure.    BCR 5.8 (L) 10/21/2023    CO2 25.4 10/21/2023    CALCIUM 8.2 (L) 10/21/2023    PROTENTOTREF 7.0 03/16/2019    ALBUMIN 2.0 (L) 10/14/2023    LABIL2 1.2 03/16/2019    AST 14 10/14/2023    ALT 13  10/14/2023       Lab Results   Component Value Date    IRON 46 10/15/2023    TIBC 100 (L) 10/15/2023    FERRITIN 943.00 (H) 10/15/2023       Lab Results   Component Value Date    [id number] 1,146 (H) 03/16/2023       Lab Results   Component Value Date    FOLATE >20.00 03/16/2023          Assessment & Plan   *Stage IV hormone receptor positive lobular breast cancer  7/3/2023-PET showed metastatic disease to bone, hypermetabolic ascites with soft tissue density anterior pelvis (possible peritoneal carcinomatosis), soft tissue thickening left renal pelvis  Paracentesis 10/9/2023-cytology showed rare atypical cells suspicious for lobular carcinoma but ER/[place] negative?  Patient currently on hormonal therapy with monthly Faslodex combined with abemaciclib 100 mg twice daily  Restaging PET scheduled for 10/23/2023 and to see Dr. Irvin 10/27/2023  Surgery has seen the patient patient has IVS versus small bowel obstruction.  CT with thickening of the antrum of the stomach.  Started on Protonix.  NG tube started overnight.  Patient is given a clamping trial today after returning from dialysis.  If output is less than 200 cc they will plan to remove the NG tube.  If unable to remove and high output continues then recommend EGD for evaluation of thickening of the antrum.  Questionable metastatic disease     *Hypotension acute on chronic  Patient developed hypotension after large volume paracentesis (11 L) combined with end-stage renal disease, UTI, AS  Patient now off vasopressors and on midodrine 10 mg 3 times daily but blood pressure is soft 88/54  Completed Rocephin for UTI     *End-stage renal disease on hemodialysis     *Aortic valve stenosis unamenable to intervention per cardiology     *Chronic anemia, multifactorial  Hemoglobin stable 8.4, ferritin 943, iron sat 46%  She receives scheduled Retacrit 3 times per week     *Ileus with NG tube  Hopefully secondary to overuse of Imodium and not related to  carcinomatous/mechanical  Symptomatically improving with NG decompression-KUB 10/15 showed improved gaseous distention of the stomach  Persistent nausea/vomiting when NG clamped  CT abdomen/pelvis 10/19/2023-moderately large ascites, thickened gastric antrum consistent with antritis versus metastatic disease, duodenum collapsed, colon collapsed, small left pleural effusion, T11 sclerosis     *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 Irvin scheduled before end of the month  Monitor CBC/labs  Unclear if the patient's GI issues are secondary to malignant obstruction?  If so would portend a poor prognosis under the circumstances of her morbid obesity, end-stage renal disease, untreatable ABS etc.  Check CA 15-3-stable/normal 21.3  Currently followed by surgery for ileus/small bowel obstruction.  NG tube is being clamped to see if she still has high output and if she does they may need to consider EGD  Evelina Rivera MD                      10/21/2023      CC:

## 2023-10-22 LAB
ALBUMIN SERPL-MCNC: 2 G/DL (ref 3.5–5.2)
ALBUMIN/GLOB SERPL: 0.5 G/DL
ALP SERPL-CCNC: 101 U/L (ref 39–117)
ALT SERPL W P-5'-P-CCNC: 12 U/L (ref 1–33)
ANION GAP SERPL CALCULATED.3IONS-SCNC: 16 MMOL/L (ref 5–15)
AST SERPL-CCNC: 18 U/L (ref 1–32)
BASOPHILS # BLD AUTO: 0.03 10*3/MM3 (ref 0–0.2)
BASOPHILS NFR BLD AUTO: 0.4 % (ref 0–1.5)
BILIRUB SERPL-MCNC: 0.5 MG/DL (ref 0–1.2)
BUN SERPL-MCNC: 26 MG/DL (ref 8–23)
BUN/CREAT SERPL: 5.6 (ref 7–25)
CALCIUM SPEC-SCNC: 8.7 MG/DL (ref 8.6–10.5)
CHLORIDE SERPL-SCNC: 96 MMOL/L (ref 98–107)
CO2 SERPL-SCNC: 23 MMOL/L (ref 22–29)
CREAT SERPL-MCNC: 4.67 MG/DL (ref 0.57–1)
DEPRECATED RDW RBC AUTO: 57.8 FL (ref 37–54)
EGFRCR SERPLBLD CKD-EPI 2021: 9.9 ML/MIN/1.73
EOSINOPHIL # BLD AUTO: 0.09 10*3/MM3 (ref 0–0.4)
EOSINOPHIL NFR BLD AUTO: 1.1 % (ref 0.3–6.2)
ERYTHROCYTE [DISTWIDTH] IN BLOOD BY AUTOMATED COUNT: 15.9 % (ref 12.3–15.4)
GLOBULIN UR ELPH-MCNC: 4 GM/DL
GLUCOSE BLDC GLUCOMTR-MCNC: 131 MG/DL (ref 70–130)
GLUCOSE BLDC GLUCOMTR-MCNC: 137 MG/DL (ref 70–130)
GLUCOSE BLDC GLUCOMTR-MCNC: 152 MG/DL (ref 70–130)
GLUCOSE BLDC GLUCOMTR-MCNC: 156 MG/DL (ref 70–130)
GLUCOSE BLDC GLUCOMTR-MCNC: 165 MG/DL (ref 70–130)
GLUCOSE BLDC GLUCOMTR-MCNC: 168 MG/DL (ref 70–130)
GLUCOSE SERPL-MCNC: 139 MG/DL (ref 65–99)
HCT VFR BLD AUTO: 30.7 % (ref 34–46.6)
HGB BLD-MCNC: 9.8 G/DL (ref 12–15.9)
IMM GRANULOCYTES # BLD AUTO: 0.4 10*3/MM3 (ref 0–0.05)
IMM GRANULOCYTES NFR BLD AUTO: 4.7 % (ref 0–0.5)
LYMPHOCYTES # BLD AUTO: 0.96 10*3/MM3 (ref 0.7–3.1)
LYMPHOCYTES NFR BLD AUTO: 11.3 % (ref 19.6–45.3)
MCH RBC QN AUTO: 31.7 PG (ref 26.6–33)
MCHC RBC AUTO-ENTMCNC: 31.9 G/DL (ref 31.5–35.7)
MCV RBC AUTO: 99.4 FL (ref 79–97)
MONOCYTES # BLD AUTO: 0.88 10*3/MM3 (ref 0.1–0.9)
MONOCYTES NFR BLD AUTO: 10.3 % (ref 5–12)
NEUTROPHILS NFR BLD AUTO: 6.17 10*3/MM3 (ref 1.7–7)
NEUTROPHILS NFR BLD AUTO: 72.2 % (ref 42.7–76)
NRBC BLD AUTO-RTO: 0.6 /100 WBC (ref 0–0.2)
PLATELET # BLD AUTO: 185 10*3/MM3 (ref 140–450)
PMV BLD AUTO: 10.5 FL (ref 6–12)
POTASSIUM SERPL-SCNC: 4.3 MMOL/L (ref 3.5–5.2)
PROT SERPL-MCNC: 6 G/DL (ref 6–8.5)
RBC # BLD AUTO: 3.09 10*6/MM3 (ref 3.77–5.28)
SODIUM SERPL-SCNC: 135 MMOL/L (ref 136–145)
WBC NRBC COR # BLD: 8.53 10*3/MM3 (ref 3.4–10.8)

## 2023-10-22 PROCEDURE — 97530 THERAPEUTIC ACTIVITIES: CPT

## 2023-10-22 PROCEDURE — 99231 SBSQ HOSP IP/OBS SF/LOW 25: CPT | Performed by: SURGERY

## 2023-10-22 PROCEDURE — 82948 REAGENT STRIP/BLOOD GLUCOSE: CPT

## 2023-10-22 PROCEDURE — 63710000001 INSULIN LISPRO (HUMAN) PER 5 UNITS: Performed by: INTERNAL MEDICINE

## 2023-10-22 PROCEDURE — 80053 COMPREHEN METABOLIC PANEL: CPT | Performed by: INTERNAL MEDICINE

## 2023-10-22 PROCEDURE — 99231 SBSQ HOSP IP/OBS SF/LOW 25: CPT | Performed by: INTERNAL MEDICINE

## 2023-10-22 PROCEDURE — 85025 COMPLETE CBC W/AUTO DIFF WBC: CPT | Performed by: HOSPITALIST

## 2023-10-22 RX ADMIN — MIDODRINE HYDROCHLORIDE 5 MG: 5 TABLET ORAL at 08:59

## 2023-10-22 RX ADMIN — GABAPENTIN 300 MG: 300 CAPSULE ORAL at 21:17

## 2023-10-22 RX ADMIN — INSULIN LISPRO 2 UNITS: 100 INJECTION, SOLUTION INTRAVENOUS; SUBCUTANEOUS at 08:59

## 2023-10-22 RX ADMIN — FOLIC ACID 1 MG: 1 TABLET ORAL at 08:59

## 2023-10-22 RX ADMIN — MIDODRINE HYDROCHLORIDE 5 MG: 5 TABLET ORAL at 17:02

## 2023-10-22 RX ADMIN — ATORVASTATIN CALCIUM 40 MG: 20 TABLET, FILM COATED ORAL at 21:17

## 2023-10-22 RX ADMIN — SENNOSIDES AND DOCUSATE SODIUM 2 TABLET: 50; 8.6 TABLET ORAL at 09:00

## 2023-10-22 RX ADMIN — INSULIN LISPRO 2 UNITS: 100 INJECTION, SOLUTION INTRAVENOUS; SUBCUTANEOUS at 17:02

## 2023-10-22 RX ADMIN — MUPIROCIN 1 APPLICATION: 20 OINTMENT TOPICAL at 09:00

## 2023-10-22 RX ADMIN — INSULIN LISPRO 2 UNITS: 100 INJECTION, SOLUTION INTRAVENOUS; SUBCUTANEOUS at 21:17

## 2023-10-22 RX ADMIN — Medication 1000 MCG: at 08:59

## 2023-10-22 RX ADMIN — Medication 1 TABLET: at 08:59

## 2023-10-22 RX ADMIN — POTASSIUM CHLORIDE 20 MEQ: 750 TABLET, EXTENDED RELEASE ORAL at 08:59

## 2023-10-22 RX ADMIN — SERTRALINE 150 MG: 100 TABLET, FILM COATED ORAL at 08:59

## 2023-10-22 RX ADMIN — INSULIN LISPRO 2 UNITS: 100 INJECTION, SOLUTION INTRAVENOUS; SUBCUTANEOUS at 12:35

## 2023-10-22 RX ADMIN — MIDODRINE HYDROCHLORIDE 5 MG: 5 TABLET ORAL at 12:41

## 2023-10-22 RX ADMIN — POLYETHYLENE GLYCOL 3350 17 G: 17 POWDER, FOR SOLUTION ORAL at 08:58

## 2023-10-22 RX ADMIN — Medication 10 ML: at 21:17

## 2023-10-22 RX ADMIN — Medication 10 ML: at 09:00

## 2023-10-22 RX ADMIN — PANTOPRAZOLE SODIUM 40 MG: 40 INJECTION, POWDER, FOR SOLUTION INTRAVENOUS at 08:59

## 2023-10-22 RX ADMIN — LEVOTHYROXINE SODIUM 50 MCG: 50 TABLET ORAL at 08:59

## 2023-10-22 NOTE — PROGRESS NOTES
REASON FOR FOLLOW-UP:  Metastatic breast cancer followed by Dr. Irvin on Faslodex and abemaciclib  Severe aortic stenosis but not a candidate for TAVR    Interval history:  Patient seen today during her hemodialysis session.  Her NG is clamped until 6 PM.  She has not had nausea/emesis since clamping her CT.    October 21, 2023: Cardiology on case.  Patient has severe degenerative aortic valve stenosis and not a candidate for transcatheter aortic valve replacement.  Considering palliative care  Patient had been on Faslodex and abemaciclib 100 mg twice daily followed by Dr. Irvin.  She had PET scan scheduled end of the month.  Concern is if the GI issues are secondary to malignant obstruction.  CA 15-3 was normal at 21.3.  Patient has peritoneal carcinomatosis paracentesis had shown atypical cells suspicious for lobular carcinoma ER/MT negative.  Patient also on end-stage renal disease on hemodialysis    October 22nd 2023: CT scan showed thickening of the antrum of the stomach.  Patient on Protonix.  NG tube showed high output and clamping of the NG tube was done yesterday.  Questionable EGD to see if the thickening in the antrum is a metastatic lesion.  Per nurse NG tube output was 300 mL patient has been on Protonix.  Surgery has recommended clamping of the NG tube to see if she tolerates.  If she experiences nausea he would recommend repeat EGD.  To see if biopsy of the thickened antrum of the stomach could show malignancy        HISTORY OF PRESENT ILLNESS:   This is a 63-year-old woman followed by Dr. Irvin in our practice for metastatic lobular breast cancer which is ER/MT positive on treatment with Faslodex 500 mg IM every 4 weeks and abemaciclib 100 mg twice daily as of her most recent office visit 9/6/2023.  The patient also has anemia of end-stage renal disease/malignancy/treatment related.  Comorbidities include end-stage renal disease on hemodialysis, CHF/aortic stenosis.     The patient was recently  admitted for intractable nausea vomiting diarrhea noted to have significant ascites for which she underwent a paracentesis.  Stool was positive for C. difficile toxin but negative for C. difficile antigen consistent with carrier state.  After her paracentesis of 11 L she became progressively weak and dizzy.  She called EMS and was brought to the ER found to be significantly hypotensive treated with fluid boluses but remained hypotensive requiring transfer to the ICU.  Patient also diagnosed with urinary tract infection and ileus (she was using a lot of Imodium at home for diarrhea) requiring NG tube.  She is currently on Rocephin      Past Medical History, Past Surgical History, Social History, Family History have been reviewed and are without significant changes except as mentioned.    Review of Systems   Constitutional:  Positive for activity change and fatigue.   Respiratory:  Negative for shortness of breath.    Gastrointestinal:  Negative for abdominal pain, nausea and vomiting.   Genitourinary:  Positive for difficulty urinating.   Musculoskeletal:  Positive for gait problem.   Neurological:  Positive for weakness. Negative for light-headedness.   Hematological:  Does not bruise/bleed easily.        Medications:  The current medication list was reviewed in the EMR    ALLERGIES:  No Known Allergies    Objective      Vitals:    10/22/23 0758   BP: 114/64   Pulse:    Resp: 18   Temp: 97.7 °F (36.5 °C)   SpO2: 96%          Physical Exam    CONSTITUTIONAL: pleasant well-developed/obese adult woman in no acute distress seen on dialysis  HEENT: no icterus, no thrush, moist membranes, NG tube in place but clamped  LYMPH: no cervical or supraclavicular lad  CV: Tachycardic S1S2, no murmur  RESP: cta bilat, no wheezing, no rales  GI: soft, non-tender, no splenomegaly, +bs, obese  MUSC: 1+ dependent edema  NEURO: alert and oriented x3, mild global weakness  PSYCH: normal mood and affect  I have reexamined the patient and  the results are consistent with the previously documented exam. Evelina Rivera MD     RECENT LABS:  Hematology Results from last 7 days   Lab Units 10/22/23  0401 10/21/23  0353 10/20/23  0534   WBC 10*3/mm3 8.53 8.18 7.93   HEMOGLOBIN g/dL 9.8* 8.9* 8.4*   HEMATOCRIT % 30.7* 28.0* 26.5*   PLATELETS 10*3/mm3 185 163 161     2  Lab Results   Component Value Date    GLUCOSE 139 (H) 10/22/2023    BUN 26 (H) 10/22/2023    CREATININE 4.67 (H) 10/22/2023    EGFRIFNONA 11 (L) 02/16/2022    EGFRIFAFRI  01/29/2022      Comment:      <15 Indicative of kidney failure.    BCR 5.6 (L) 10/22/2023    CO2 23.0 10/22/2023    CALCIUM 8.7 10/22/2023    PROTENTOTREF 7.0 03/16/2019    ALBUMIN 2.0 (L) 10/22/2023    LABIL2 1.2 03/16/2019    AST 18 10/22/2023    ALT 12 10/22/2023       Lab Results   Component Value Date    IRON 46 10/15/2023    TIBC 100 (L) 10/15/2023    FERRITIN 943.00 (H) 10/15/2023       Lab Results   Component Value Date    QLHVPFKC36 1,146 (H) 03/16/2023       Lab Results   Component Value Date    FOLATE >20.00 03/16/2023          Assessment & Plan   *Stage IV hormone receptor positive lobular breast cancer  7/3/2023-PET showed metastatic disease to bone, hypermetabolic ascites with soft tissue density anterior pelvis (possible peritoneal carcinomatosis), soft tissue thickening left renal pelvis  Paracentesis 10/9/2023-cytology showed rare atypical cells suspicious for lobular carcinoma but ER/[place] negative?  Patient currently on hormonal therapy with monthly Faslodex combined with abemaciclib 100 mg twice daily  Restaging PET scheduled for 10/23/2023 and to see Dr. Irvin 10/27/2023  Surgery has seen the patient patient has IVS versus small bowel obstruction.  CT with thickening of the antrum of the stomach.  Started on Protonix.  NG tube started overnight.  Patient is given a clamping trial today after returning from dialysis.  If output is less than 200 cc they will plan to remove the NG tube.  If unable to remove  and high output continues then recommend EGD for evaluation of thickening of the antrum.  Questionable metastatic disease  October 22, 2023: NG tube at 300 mL, surgery following questionable EGD to evaluate the thickening in the stomach  Surgery has seen the patient and they are clamping the NG tube and if she does not tolerate they will consider doing EGD and biopsy of the gastric antrum that there is thickening to rule out malignancy     *Hypotension acute on chronic  Patient developed hypotension after large volume paracentesis (11 L) combined with end-stage renal disease, UTI, AS  Patient now off vasopressors and on midodrine 10 mg 3 times daily but blood pressure is soft 88/54  Completed Rocephin for UTI     *End-stage renal disease on hemodialysis     *Aortic valve stenosis unamenable to intervention per cardiology     *Chronic anemia, multifactorial  Hemoglobin stable 8.4, ferritin 943, iron sat 46%  She receives scheduled Retacrit 3 times per week     *Ileus with NG tube  Hopefully secondary to overuse of Imodium and not related to carcinomatous/mechanical  Symptomatically improving with NG decompression-KUB 10/15 showed improved gaseous distention of the stomach  Persistent nausea/vomiting when NG clamped  CT abdomen/pelvis 10/19/2023-moderately large ascites, thickened gastric antrum consistent with antritis versus metastatic disease, duodenum collapsed, colon collapsed, small left pleural effusion, T11 sclerosis     *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 Irvin scheduled before end of the month  Monitor CBC/labs  Unclear if the patient's GI issues are secondary to malignant obstruction?  If so would portend a poor prognosis under the circumstances of her morbid obesity, end-stage renal disease, untreatable ABS etc.  Check CA 15-3-stable/normal 21.3  Currently followed by  surgery for ileus/small bowel obstruction.  NG tube is being clamped to see if she still has high output and if she does they may need to consider EGD  Await surgery input about possible EGD to evaluate thickening in the stomach    Patient's NG tube is clamped today.    Evelina Rivera MD                      10/22/2023      CC:

## 2023-10-22 NOTE — THERAPY TREATMENT NOTE
Patient Name: Juana Hall  : 1960    MRN: [id number]                              Today's Date: 10/22/2023       Admit Date: 10/13/2023    Visit Dx:     ICD-10-CM ICD-9-CM   1. Hypotension, unspecified hypotension type  I95.9 458.9   2. Acute UTI  N39.0 599.0   3. End-stage renal disease on hemodialysis  N18.6 585.6    Z99.2 V45.11   4. Other fracture of left foot, initial encounter for closed fracture  S92.812A 825.20   5. Anemia in stage 3 chronic kidney disease, unspecified whether stage 3a or 3b CKD  N18.30 285.21    D63.1 585.3     Patient Active Problem List   Diagnosis    Anxiety    Depression    Diabetes type 2, controlled    Hyperlipidemia    Heart murmur    Hypertension    Post-traumatic osteoarthritis of multiple joints    Psoriasis    Radiculopathy    Acquired hypothyroidism    Peripheral neuropathy    Normocytic anemia    History of right breast cancer    Omental mass    Primary malignant neoplasm of breast with metastasis    Elevated serum creatinine    Iron deficiency anemia    Anemia in stage 3 chronic kidney disease    Stage 3b chronic kidney disease    Anxiety and depression    RYAN (acute kidney injury)    Anemia, chronic disease    Diastolic CHF, chronic    Frequent UTI    Metabolic acidosis    Severe malnutrition    Hydronephrosis    Generalized weakness    COVID-19 virus detected    Hypocalcemia    Morbid obesity with BMI of 50.0-59.9, adult    ESRD (end stage renal disease)    Pancytopenia due to chemotherapy    Chronic anemia    Rectal bleeding    Bicuspid aortic valve    Complicated UTI (urinary tract infection)    Acute UTI (urinary tract infection)    Severe aortic stenosis    Combined systolic and diastolic congestive heart failure    Depression    ANDREW (obstructive sleep apnea)    Diastolic CHF, chronic    Adverse effect of chemotherapy    UTI (urinary tract infection)    Hematuria    Dysuria    Intractable vomiting    Hypotension    Abnormal urinalysis    Hypokalemia     Past  Medical History:   Diagnosis Date    Anemia     Anxiety and depression     Bicuspid aortic valve     had surgery    Breast cancer 2004    RIGHT, HAD NEELA. 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    History of sepsis 2010    KIDNEY STONE    History of transfusion     no reaction    Hyperlipidemia     Hyperthyroidism     Hypothyroidism     Kidney stone     CURRENT LEFT-DEC 2021    Lymphedema of leg     LEFT    Metastasis from breast cancer 2019    STOMACH-OMENTUM    Neuromuscular disorder     Obesity     ANDREW on CPAP     CPAP    Osteoarthrosis     Peripheral neuropathy     FEET BILAT    Radiculopathy     Rectal bleeding 08/16/2022    Thickened endometrium     Urinary incontinence     wears pads    Weakness     BILAT LEGS-WITH ANY AMT OF TIME     Past Surgical History:   Procedure Laterality Date    AORTIC VALVULOPLASTY  01/2023    ARTERIOVENOUS FISTULA/SHUNT SURGERY Left 11/03/2021    Procedure: LEFT  BRACHIOCEPALIC ARTERIOVENOUS FISTULA;  Surgeon: Dejuan Adler MD;  Location: [facility] MAIN OR;  Service: Vascular;  Laterality: Left;    BREAST RECONSTRUCTION  2004    WITH IMPLANTS, AND REVISION, NOW REMOVED    BREAST SURGERY Bilateral 2004    breast implants removed and then replaced due to infection    CARDIAC CATHETERIZATION N/A 08/23/2022    Procedure: CORONARY ANGIOGRAPHY;  Surgeon: Luis Rivers MD;  Location: [facility] CATH INVASIVE LOCATION;  Service: Cardiology;  Laterality:  N/A;    CARDIAC CATHETERIZATION N/A 2022    Procedure: Right Heart Cath;  Surgeon: Luis Rivers MD;  Location: [facility] CATH INVASIVE LOCATION;  Service: Cardiology;  Laterality: N/A;    CARDIAC CATHETERIZATION N/A 01/10/2023    Procedure: Valvuloplasty;  Surgeon: Luis Rivers MD;  [facility];  Service: Cardiovascular;  Laterality: N/A;  01/10/2023    CARDIAC CATHETERIZATION N/A 01/10/2023    Procedure: Aortic root aortogram;  Surgeon: Luis Rivers MD;  [facility];  Service: Cardiovascular;  Laterality: N/A;    CATARACT EXTRACTION WITH INTRAOCULAR LENS IMPLANT Bilateral      SECTION  1987     SECTION  1987    CYSTOSCOPY W/ URETERAL STENT PLACEMENT      CYSTOSCOPY W/ URETERAL STENT PLACEMENT Left 2021    Procedure: CYSTOSCOPY KEFT RETROGRADE PYELOGRAM  LEFT  URETERAL STENT PLACEMENT;  Surgeon: Leodan Mckee Jr., MD;  Location: [facility] OR;  Service: Urology;  Laterality: Left;    CYSTOSCOPY W/ URETERAL STENT PLACEMENT Left 03/10/2022    Procedure: CYSTOSCOPY AND LEFT URETERAL STENT EXCHANGE, RETROGRADE PYELOGRAM;  Surgeon: Leodan Mckee Jr., MD;  Location: [facility] OR;  Service: Urology;  Laterality: Left;    CYSTOSCOPY W/ URETERAL STENT PLACEMENT Left 2023    Procedure: CYSTOSCOPY WITH LEFT URETERAL STENT PLACEMENT, RETROGRADE PYELOGRAM, CLOT EVACUATION, BLADDER FULGARATION;  Surgeon: Leodan Mckee Jr., MD;  Location: [facility] MAIN OR;  Service: Urology;  Laterality: Left;    D & C HYSTEROSCOPY N/A 2017    Procedure: DILATATION AND CURETTAGE, HYSTEROSCOPY ;  Surgeon: Cherie Oliveros MD;  Location: [facility] OR O[facility];  Service:     D & C HYSTEROSCOPY N/A 2019    Procedure: DILATATION AND CURETTAGE HYSTEROSCOPY/POLYPECTOMY;  Surgeon: Cherie Oliveros MD;  [facility];  Service: Gynecology    D & C HYSTEROSCOPY ENDOMETRIAL ABLATION N/A  5/5/2023    Procedure: DILATATION AND CURETTAGE;  Surgeon: Ina Marcos MD;  Location: [facility] MAIN OR;  Service: Obstetrics/Gynecology;  Laterality: N/A;    ENDOSCOPY      INSERTION AND REMOVAL HEMODIALYSIS CATHETER Right 05/01/2021    INSERTION HEMODIALYSIS CATHETER Right 05/01/2021    Procedure: HEMODIALYSIS CATHETER INSERTION;  Surgeon: Clint Hernández MD;  Location: [facility] MAIN OR;  Service: Vascular;  Laterality: Right;    LYMPH NODE BIOPSY      MASTECTOMY Bilateral 2004    VENOUS ACCESS DEVICE (PORT) INSERTION AND REMOVAL        General Information       Row Name 10/22/23 1449          Physical Therapy Time and Intention    Document Type therapy note (daily note)  -     Mode of Treatment physical therapy  -       Row Name 10/22/23 1449          General Information    Existing Precautions/Restrictions fall  -       Row Name 10/22/23 1449          Cognition    Orientation Status (Cognition) oriented x 4  -               User Key  (r) = Recorded By, (t) = Taken By, (c) = Cosigned By      Initials Name Provider Type     Apolonia Pairsi PTA Physical Therapist Assistant                   Mobility       Row Name 10/22/23 1452          Bed Mobility    Bed Mobility supine-sit;sit-supine  -     Supine-Sit [place] (Bed Mobility) minimum assist (75% patient effort)  -     Sit-Supine [place] (Bed Mobility) maximum assist (25% patient effort);dependent (less than 25% patient effort);2 person assist  -     Comment, (Bed Mobility) pt required more assist to return to supine d/t beginning to slide off EOB  -       Row Name 10/22/23 1452          Sit-Stand Transfer    Sit-Stand [place] (Transfers) moderate assist (50% patient effort);maximum assist (25% patient effort);2 person assist  -     Assistive Device (Sit-Stand Transfers) --  HHA  -     Comment, (Sit-Stand Transfer) stood 3x, though LEs too weak to take any steps and pt requesting to sit back down  -               User  Key  (r) = Recorded By, (t) = Taken By, (c) = Cosigned By      Initials Name Provider Type    Apolonia Sterling PTA Physical Therapist Assistant                   Obj/Interventions    No documentation.                  Goals/Plan    No documentation.                  Clinical Impression       Row Name 10/22/23 1502          Pain    Pretreatment Pain Rating 3/10  -SM     Posttreatment Pain Rating 3/10  -SM     Pain Location - Side/Orientation Left  -SM     Pain Location lower  -SM     Pain Location - extremity  -SM     Pain Intervention(s) Repositioned;Ambulation/increased activity;Rest  -       Row Name 10/22/23 1502          Positioning and Restraints    Pre-Treatment Position in bed  -SM     Post Treatment Position bed  -SM     In Bed supine;call light within reach;encouraged to call for assist;exit alarm on;with family/caregiver  -               User Key  (r) = Recorded By, (t) = Taken By, (c) = Cosigned By      Initials Name Provider Type    Apolonia Sterling PTA Physical Therapist Assistant                   Outcome Measures       Row Name 10/22/23 1503 10/22/23 0911       How much help from another person do you currently need...    Turning from your back to your side while in flat bed without using bedrails? 3  -SM 2  -IR    Moving from lying on back to sitting on the side of a flat bed without bedrails? 3  -SM 2  -IR    Moving to and from a bed to a chair (including a wheelchair)? 1  -SM 1  -IR    Standing up from a chair using your arms (e.g., wheelchair, bedside chair)? 2  -SM 1  -IR    Climbing 3-5 steps with a railing? 1  -SM 1  -IR    To walk in hospital room? 1  -SM 1  -IR    AM-PAC 6 Clicks Score (PT) 11  -SM 8  -IR    Highest level of mobility 4 --> Transferred to chair/commode  -SM 3 --> Sat at edge of bed  -IR      Row Name 10/22/23 1503          Functional Assessment    Outcome Measure Options AM-PAC 6 Clicks Basic Mobility (PT)  -SM               User Key  (r) = Recorded By, (t) =  Taken By, (c) = Cosigned By      Initials Name Provider Type    Apolonia Sterling PTA Physical Therapist Assistant    Emily Smith RN Registered Nurse                                 Physical Therapy Education       Title: PT OT SLP Therapies (Done)       Topic: Physical Therapy (Done)       Point: Mobility training (Done)       Learning Progress Summary             Patient Acceptance, E,TB,D, VU,NR by  at 10/22/2023 1504    Eager, E,TB,D, VU by ADRIA at 10/20/2023 1345    Acceptance, E, NR by AR at 10/18/2023 1549    Acceptance, E,D, DU by PC at 10/16/2023 1629    Acceptance, E, VU by BL at 10/14/2023 0630   Family Eager, E,TB,D, VU by ADRIA at 10/20/2023 1345                         Point: Home exercise program (Done)       Learning Progress Summary             Patient Acceptance, E,TB,D, VU,NR by  at 10/22/2023 1504    Eager, E,TB,D, VU by ADRIA at 10/20/2023 1345    Acceptance, E, NR by AR at 10/18/2023 1549    Acceptance, E,D, DU by PC at 10/16/2023 1629    Acceptance, E, VU by BL at 10/14/2023 0630   Family Eager, E,TB,D, VU by ADRIA at 10/20/2023 1345                         Point: Body mechanics (Done)       Learning Progress Summary             Patient Acceptance, E,TB,D, VU,NR by  at 10/22/2023 1504    Eager, E,TB,D, VU by ADRIA at 10/20/2023 1345    Acceptance, E, NR by AR at 10/18/2023 1549    Acceptance, E,D, DU by PC at 10/16/2023 1629    Acceptance, E, VU by BL at 10/14/2023 0630   Family Eager, E,TB,D, VU by ADRIA at 10/20/2023 1345                         Point: Precautions (Done)       Learning Progress Summary             Patient Acceptance, E,TB,D, VU,NR by  at 10/22/2023 1504    Eager, E,TB,D, VU by ADRIA at 10/20/2023 1345    Acceptance, E, NR by AR at 10/18/2023 1549    Acceptance, E,D, DU by PC at 10/16/2023 1629    Acceptance, E,D, VU,NR by MG at 10/15/2023 1148    Acceptance, E, VU by BL at 10/14/2023 0630   Family Irving, DEMARTB,D, VU by ADRIA at 10/20/2023 1345                                          User Key       Initials Effective Dates Name Provider Type Discipline    PC 06/16/21 -  Sabi Kevin, PT Physical Therapist PT    JM 03/07/18 -  Ana Parisi PTA Physical Therapist Assistant PT    AR 06/16/21 -  Maria E Negrete, PT Physical Therapist PT    SM 03/07/18 -  Apolonia Parisi PTA Physical Therapist Assistant PT    MG 05/24/22 -  Karla Bhatia, PT Physical Therapist PT    BL 10/18/22 -  Zainab Grace, RN Registered Nurse Nurse                  PT Recommendation and Plan     Plan of Care Reviewed With: patient  Progress: improving  Outcome Evaluation: Pt tolerated treatment fair this date. Required min A for supine to sit, then stood 3x w/ mod-max A x2. Pt was unable to move feet to take steps, though very motivated to get to the chair. Pt needed to be returned to supine w/ max-dep A x2 d/t beginning to slide off EOB. Pt was safely returned to supine and scooted up towards HOB. Encouraged pt to continue working on exercises in bed.     Time Calculation:         PT Charges       Row Name 10/22/23 1513             Time Calculation    Start Time 1327  -      Stop Time 1347  -      Time Calculation (min) 20 min  -      PT Received On 10/22/23  -      PT - Next Appointment 10/23/23  -                User Key  (r) = Recorded By, (t) = Taken By, (c) = Cosigned By      Initials Name Provider Type     Iftikhar Parisijuana Chandra PTA Physical Therapist Assistant                  Therapy Charges for Today       Code Description Service Date Service Provider Modifiers Qty    77198595803 HC PT THERAPEUTIC ACT EA 15 MIN 10/22/2023 Apolonia Parisi PTA GP 1    83318493095 HC PT THER SUPP EA 15 MIN 10/22/2023 ParisiApolonia PTA GP 1            PT G-Codes  Outcome Measure Options: AM-PAC 6 Clicks Basic Mobility (PT)  AM-PAC 6 Clicks Score (PT): 11  AM-PAC 6 Clicks Score (OT): 11  PT Discharge Summary  Anticipated Discharge Disposition (PT): skilled nursing facility    Apolonia Corazon  Isak, PTA  10/22/2023

## 2023-10-22 NOTE — PROGRESS NOTES
IMPRESSION & PLAN:  Ms. Rivera is a 63-year-old lady with metastatic breast cancer, likely peritoneal carcinomatosis with malignant ascites, and ileus versus small bowel obstruction.  CT with thickening of the antrum of the stomach.  Protonix started.  Output remains high but output is cleared up substantially.  She is eating a ton of ice.  I have recommended clamping today and seeing how she tolerates.  If she is able to continue to eat as much ice as she is without nausea then can likely remove tomorrow.  If she experiences nausea then tube should be placed back to low wall suction.  If this occurs then recommend EGD for further evaluation.    CC:    Chief Complaint   Patient presents with    Weakness - Generalized         HPI: Denies nausea.  Eating lots of ice chips and the NG tube output has cleared.  Continues to pass flatus and having bowel movements.    ROS:   Constitutional: No fever, no chills  CV:  No chest pain. No palpitations.   Lungs:  No shortness of breath or cough.   GI:   Denies nausea today.  Per HPI.      PE:    VS:   Vitals:    10/22/23 0758   BP: 114/64   Pulse:    Resp: 18   Temp: 97.7 °F (36.5 °C)   SpO2: 96%      CONST: Awake, alert, NG tube in nare with nonbilious output in NG canister  LUNGS: symmetric excursion, normal inspiratory effort  CV: regular rate, well perfused  Abdomen: Soft, obese, nontender    LABS:  CBC          10/20/2023    05:34 10/21/2023    03:53 10/22/2023    04:01   CBC   WBC 7.93  8.18  8.53    RBC 2.68  2.79  3.09    Hemoglobin 8.4  8.9  9.8    Hematocrit 26.5  28.0  30.7    MCV 98.9  100.4  99.4    MCH 31.3  31.9  31.7    MCHC 31.7  31.8  31.9    RDW 15.5  15.7  15.9    Platelets 161  163  185      BMP          10/20/2023    05:34 10/21/2023    03:53 10/22/2023    04:01   BMP   BUN 40  33  26    Creatinine 6.60  5.73  4.67    Sodium 140  139  135    Potassium 3.5  4.0  4.3    Chloride 97  99  96    CO2 25.0  25.4  23.0    Calcium 8.5  8.2  8.7           RADIOLOGY:  CT abdomen pelvis reviewed showing thickening of the antrum and decompression of the bowel distal to the stomach.  Oral contrast did traverse the thickened area of the antrum.

## 2023-10-22 NOTE — PROGRESS NOTES
"   LOS: 8 days     Chief Complaint/ Reason for encounter: ESRD management    Subjective   10/19/23 : She feels a little better today  Still with frequent bowel movements but less diarrhea  BP remains low normal, on midodrine  NG tube remains in place, clamped  She remains n.p.o. with occasional nausea    10/20: NG tube remains in place, still with copious amounts of NG output  Not as much diarrhea, no abdominal pain  Scheduled for dialysis today.  Weights very labile and do not seem accurate to me    10/21: She complained of abdominal pain and nausea and only received an hour and a half of her dialysis treatment.  I tried to convince her to complete another hour but patient refused  Scheduled for additional dialysis today  NG tube currently clamped    10/22, about the same today, high NG tube output, continues to have diarrhea  Did tolerate dialysis yesterday after receiving albumin and midodrine for blood pressure support, 3.4 L of fluid removed    Medical history reviewed:  History of Present Illness    Subjective    History taken from: Patient and chart    Vital Signs  Temp:  [97.5 °F (36.4 °C)-98.1 °F (36.7 °C)] 97.7 °F (36.5 °C)  Heart Rate:  [79-88] 88  Resp:  [18] 18  BP: ()/(29-90) 114/64       Wt Readings from Last 1 Encounters:   10/21/23 0527 (!) 164 kg (360 lb 11.2 oz)   10/20/23 0457 (!) 167 kg (369 lb 1.6 oz)   10/19/23 0500 (!) 162 kg (356 lb 11.2 oz)   10/18/23 0510 (!) 166 kg (365 lb)   10/17/23 0537 (!) 168 kg (371 lb 1.6 oz)   10/14/23 1634 (!) 176 kg (387 lb)   10/14/23 0000 (!) 176 kg (387 lb 6.4 oz)   10/13/23 1251 (!) 154 kg (340 lb)       Objective:  Vital signs: (most recent): Blood pressure 114/64, pulse 88, temperature 97.7 °F (36.5 °C), temperature source Oral, resp. rate 18, height 170.2 cm (67\"), weight (!) 164 kg (360 lb 11.2 oz), SpO2 96%, not currently breastfeeding.                Objective:  General Appearance:  Comfortable, obese, ill-appearing, in no acute distress and not " in pain.  Awake, alert, oriented  HEENT: Mucous membranes moist, no injury, oropharynx clear, NG tube in place with high output  Lungs:  Normal effort and normal respiratory rate.  Breath sounds clear to auscultation.  No  respiratory distress.  No rales, decreased breath sounds or rhonchi.    Heart: Normal rate.  Regular rhythm.  S1, S2 normal.  No murmur.   Abdomen: Abdomen is soft.  Bowel sounds are normal, no abdominal tenderness.  There is no rebound or guarding  Extremities: Trace edema of bilateral lower extremities  Neurological: No focal motor or sensory deficits, pupils reactive  Skin:  Warm and dry.  No rash or cyanosis.       Results Review:    Intake/Output:     Intake/Output Summary (Last 24 hours) at 10/22/2023 1125  Last data filed at 10/22/2023 1053  Gross per 24 hour   Intake 60 ml   Output 2275 ml   Net -2215 ml         DATA:  Radiology and Labs:  The following labs independently reviewed by me. Additional labs ordered for tomorrow a.m.  Interval notes, chart personally reviewed by me.   Old records independently reviewed showing ESRD on dialysis  Discussed with patient    Risk/ complexity of medical care/ medical decision making moderate, dialysis management    Labs:   Recent Results (from the past 24 hour(s))   POC Glucose Once    Collection Time: 10/21/23  1:29 PM    Specimen: Blood   Result Value Ref Range    Glucose 106 70 - 130 mg/dL   POC Glucose Once    Collection Time: 10/21/23  4:30 PM    Specimen: Blood   Result Value Ref Range    Glucose 118 70 - 130 mg/dL   POC Glucose Once    Collection Time: 10/21/23  8:16 PM    Specimen: Blood   Result Value Ref Range    Glucose 125 70 - 130 mg/dL   POC Glucose Once    Collection Time: 10/22/23 12:09 AM    Specimen: Blood   Result Value Ref Range    Glucose 131 (H) 70 - 130 mg/dL   Comprehensive Metabolic Panel    Collection Time: 10/22/23  4:01 AM    Specimen: Blood   Result Value Ref Range    Glucose 139 (H) 65 - 99 mg/dL    BUN 26 (H) 8 - 23  mg/dL    Creatinine 4.67 (H) 0.57 - 1.00 mg/dL    Sodium 135 (L) 136 - 145 mmol/L    Potassium 4.3 3.5 - 5.2 mmol/L    Chloride 96 (L) 98 - 107 mmol/L    CO2 23.0 22.0 - 29.0 mmol/L    Calcium 8.7 8.6 - 10.5 mg/dL    Total Protein 6.0 6.0 - 8.5 g/dL    Albumin 2.0 (L) 3.5 - 5.2 g/dL    ALT (SGPT) 12 1 - 33 U/L    AST (SGOT) 18 1 - 32 U/L    Alkaline Phosphatase 101 39 - 117 U/L    Total Bilirubin 0.5 0.0 - 1.2 mg/dL    Globulin 4.0 gm/dL    A/G Ratio 0.5 g/dL    BUN/Creatinine Ratio 5.6 (L) 7.0 - 25.0    Anion Gap 16.0 (H) 5.0 - 15.0 mmol/L    eGFR 9.9 (L) >60.0 mL/min/1.73   CBC Auto Differential    Collection Time: 10/22/23  4:01 AM    Specimen: Blood   Result Value Ref Range    WBC 8.53 3.40 - 10.80 10*3/mm3    RBC 3.09 (L) 3.77 - 5.28 10*6/mm3    Hemoglobin 9.8 (L) 12.0 - 15.9 g/dL    Hematocrit 30.7 (L) 34.0 - 46.6 %    MCV 99.4 (H) 79.0 - 97.0 fL    MCH 31.7 26.6 - 33.0 pg    MCHC 31.9 31.5 - 35.7 g/dL    RDW 15.9 (H) 12.3 - 15.4 %    RDW-SD 57.8 (H) 37.0 - 54.0 fl    MPV 10.5 6.0 - 12.0 fL    Platelets 185 140 - 450 10*3/mm3    Neutrophil % 72.2 42.7 - 76.0 %    Lymphocyte % 11.3 (L) 19.6 - 45.3 %    Monocyte % 10.3 5.0 - 12.0 %    Eosinophil % 1.1 0.3 - 6.2 %    Basophil % 0.4 0.0 - 1.5 %    Immature Grans % 4.7 (H) 0.0 - 0.5 %    Neutrophils, Absolute 6.17 1.70 - 7.00 10*3/mm3    Lymphocytes, Absolute 0.96 0.70 - 3.10 10*3/mm3    Monocytes, Absolute 0.88 0.10 - 0.90 10*3/mm3    Eosinophils, Absolute 0.09 0.00 - 0.40 10*3/mm3    Basophils, Absolute 0.03 0.00 - 0.20 10*3/mm3    Immature Grans, Absolute 0.40 (H) 0.00 - 0.05 10*3/mm3    nRBC 0.6 (H) 0.0 - 0.2 /100 WBC   POC Glucose Once    Collection Time: 10/22/23  4:22 AM    Specimen: Blood   Result Value Ref Range    Glucose 137 (H) 70 - 130 mg/dL   POC Glucose Once    Collection Time: 10/22/23  7:56 AM    Specimen: Blood   Result Value Ref Range    Glucose 152 (H) 70 - 130 mg/dL       Radiology:  Pertinent radiology studies were reviewed as described  above      Medications have been reviewed separately in chart overview      ASSESSMENT:  ESRD on dialysis, Monday Wednesday Friday.  Normally on HHD 5 days a week at home  Metastatic breast cancer  Obesity  Recurrent ascites  Chronic anemia from renal failure and immunotherapy  Hypokalemia  Nausea, vomiting and diarrhea  Chronic hypotension on midodrine  Type 2 diabetes mellitus  Hypothyroidism    Diabetes type 2, controlled    Combined systolic and diastolic congestive heart failure    ANDREW (obstructive sleep apnea)      Plan:   She completed dialysis yesterday.  This was postponed from Friday  We will plan to keep her on a Tuesday Thursday Saturday schedule for now  Agree with plans to proceed with EGD.  If her bowel obstruction is due to metastatic disease then palliative care seems to be the most reasonable course  Minimal UF with dialysis due to low oral intake.  4K bath  Midodrine for BP support  IV albumin with dialysis as needed for intradialytic hypotension  Epogen with dialysis for anemia.  Iron stores appear adequate, consistent with anemia of CKD    Immunotherapy per oncology, poor prognosis  Will follow     Tai Antonio MD  [facility]   Office phone number: [phone]  Answering service phone number: [phone]    10/22/23  11:25 EDT    Dictation performed using Dragon dictation software

## 2023-10-22 NOTE — PROGRESS NOTES
Name: Juana Hall ADMIT: 10/13/2023   : 1960  PCP: Kiana Noriega APRN ([place])    MRN: [id number] LOS: 8 days   AGE/SEX: 63 y.o. female  ROOM: [facility]     Subjective   Subjective     No events overnight. Still having high output from NG. She reports soft/mushy bowel movements. Otherwise doing ok       Objective   Objective   Vital Signs  Temp:  [97.5 °F (36.4 °C)-98.1 °F (36.7 °C)] 97.7 °F (36.5 °C)  Heart Rate:  [79-88] 88  Resp:  [18] 18  BP: ()/(29-90) 114/64  SpO2:  [96 %-100 %] 96 %  on   ;   Device (Oxygen Therapy): room air  Body mass index is 56.49 kg/m².  Physical Exam  Constitutional:       General: She is not in acute distress.     Appearance: She is obese. She is ill-appearing.   Cardiovascular:      Rate and Rhythm: Normal rate and regular rhythm.      Heart sounds: Murmur heard.   Pulmonary:      Effort: Pulmonary effort is normal.      Breath sounds: Normal breath sounds.   Abdominal:      General: There is no distension.      Palpations: Abdomen is soft.      Tenderness: There is no abdominal tenderness. There is no guarding or rebound.   Musculoskeletal:         General: Deformity present.   Neurological:      Mental Status: She is alert.   Psychiatric:         Mood and Affect: Mood normal.         Behavior: Behavior normal.         Results Review     I reviewed the patient's new clinical results.  Results from last 7 days   Lab Units 10/22/23  0401 10/21/23  0353 10/20/23  0534 10/19/23  0413   WBC 10*3/mm3 8.53 8.18 7.93 8.92   HEMOGLOBIN g/dL 9.8* 8.9* 8.4* 9.2*   PLATELETS 10*3/mm3 185 163 161 157     Results from last 7 days   Lab Units 10/22/23  0401 10/21/23  0353 10/20/23  0534 10/19/23  0413   SODIUM mmol/L 135* 139 140 140   POTASSIUM mmol/L 4.3 4.0 3.5 3.8   CHLORIDE mmol/L 96* 99 97* 100   CO2 mmol/L 23.0 25.4 25.0 25.0   BUN mg/dL 26* 33* 40* 27*   CREATININE mg/dL 4.67* 5.73* 6.60* 4.94*   GLUCOSE mg/dL 139* 153* 164* 124*   Estimated Creatinine Clearance: 19.5  mL/min (A) (by C-G formula based on SCr of 4.67 mg/dL (H)).  Results from last 7 days   Lab Units 10/22/23  0401   ALBUMIN g/dL 2.0*   BILIRUBIN mg/dL 0.5   ALK PHOS U/L 101   AST (SGOT) U/L 18   ALT (SGPT) U/L 12     Results from last 7 days   Lab Units 10/22/23  0401 10/21/23  0353 10/20/23  0534 10/19/23  0413   CALCIUM mg/dL 8.7 8.2* 8.5* 8.5*   ALBUMIN g/dL 2.0*  --   --   --        COVID19   Date Value Ref Range Status   10/13/2023 Not Detected Not Detected - Ref. Range Final   09/26/2023 Not Detected Not Detected - Ref. Range Final   01/04/2023 Not Detected Not Detected - Ref. Range Final   08/20/2022 Not Detected Not Detected - Ref. Range Final   03/08/2022 Not Detected Not Detected - Ref. Range Final     Glucose   Date/Time Value Ref Range Status   10/22/2023 1127 156 (H) 70 - 130 mg/dL Final   10/22/2023 0756 152 (H) 70 - 130 mg/dL Final   10/22/2023 0422 137 (H) 70 - 130 mg/dL Final   10/22/2023 0009 131 (H) 70 - 130 mg/dL Final   10/21/2023 2016 125 70 - 130 mg/dL Final   10/21/2023 1630 118 70 - 130 mg/dL Final   10/21/2023 1329 106 70 - 130 mg/dL Final       CT Abdomen Pelvis With Contrast  CT ABDOMEN AND PELVIS WITH IV CONTRAST     HISTORY: 63-year-old female with metastatic breast cancer. Abdominal  pain. Ultrasound-guided paracentesis removed 11 L of fluid on  10/09/2023.     TECHNIQUE: Radiation dose reduction techniques were utilized, including  automated exposure control and exposure modulation based on body size.   3 mm images were obtained through the abdomen and pelvis after the  administration of IV contrast. Compared with previous CT 09/28/2023.     FINDINGS:  1. There has been reaccumulation of a moderately large volume of ascites  throughout the abdomen and pelvis, slightly smaller in volume than  previously.     2. There is a significantly thickened appearance of the gastric antrum  which is contracted. Appearance is suggestive of antritis or possibly  metastatic disease to the gastric  antrum. Duodenum is collapsed and  there is mild dilatation of a segment of jejunum, but there is no small  or large bowel obstruction. The colon is nearly completely collapsed.     3. No acute abnormality is seen at the liver, gallbladder, spleen,  pancreas, adrenals, or atrophied kidneys. Left ureteral stent remains in  place and there is no hydronephrosis. No change in the appearance of the  uterus and adnexa.     4. There is a new very small left pleural effusion and new left lower  lobe atelectasis. There is no significant change in the appearance of  the T11 sclerotic metastasis.     This report was finalized on 10/20/2023 7:46 AM by Dr. Lety Alicea M.D  on Workstation: [id number]       Scheduled Medications  Abemaciclib, 100 mg, Oral, BID  albumin human, 12.5 g, Intravenous, Once  atorvastatin, 40 mg, Oral, Nightly  diatrizoate meglumine-sodium, 30 mL, Oral, Once  epoetin jana/jana-epbx, 20,000 Units, Subcutaneous, Once per day on Mon Wed Fri  folic acid, 1 mg, Oral, Daily  gabapentin, 300 mg, Oral, Nightly  insulin lispro, 2-9 Units, Subcutaneous, Q4H  levothyroxine, 50 mcg, Oral, Q AM  midodrine, 5 mg, Oral, TID AC  multivitamin, 1 tablet, Oral, Daily  mupirocin, 1 application , Topical, Daily  pantoprazole, 40 mg, Intravenous, Q AM  polyethylene glycol, 17 g, Oral, Daily  potassium chloride, 20 mEq, Oral, Daily  senna-docusate sodium, 2 tablet, Oral, BID  sertraline, 150 mg, Oral, Daily  sodium chloride, 10 mL, Intravenous, Q12H  vitamin B-12, 1,000 mcg, Oral, Daily    Infusions   Diet  NPO Diet NPO Type: Sips with Meds, Ice Chips, Other (see comments)       Assessment/Plan     Active Hospital Problems    Diagnosis  POA    **Hypotension [I95.9]  Yes    Abnormal urinalysis [R82.90]  Unknown    Hypokalemia [E87.6]  Unknown    Diastolic CHF, chronic [I50.32]  Yes    ANDREW (obstructive sleep apnea) [G47.33]  Yes    Combined systolic and diastolic congestive heart failure [I50.40]  Yes    ESRD (end stage renal  disease) [N18.6]  Yes    Morbid obesity with BMI of 50.0-59.9, adult [E66.01, Z68.43]  Not Applicable    Anemia in stage 3 chronic kidney disease [N18.30, D63.1]  Yes    Primary malignant neoplasm of breast with metastasis [C50.919]  Yes    Acquired hypothyroidism [E03.9]  Yes    Diabetes type 2, controlled [E11.9]  Yes      Resolved Hospital Problems   No resolved problems to display.       63 y.o. female admitted with Hypotension.    63 year old woman who presented with hypotension after a large volume paracentesis during her prior admission.  She was found to have pneumonia and a UTI.  Shortly after admission, she was transferred to the ICU due to refractory hypotension.  Her course has been complicated by an ileus versus small bowel obstruction.    Hypotension-multifactorial due to infection, large-volume paracentesis, HD, aortic valve stenosis.  On midodrine  Pneumonia/UTI-completed course of antibiotics while in the ICU.  Ileus versus small bowel obstruction-NG tube in place with surgery managing. Surgery is considering an EGD   ESRD-on home hemodialysis 5 days a week at home.  Nephrology managing here.  Type 2 diabetes-glucose at goal  Aortic valve stenosis-not a candidate for intervention  Chronic diastolic heart failure-volume managed with dialysis  Paroxysmal A-fib-blood pressure too low to tolerate beta-blockade.  Not on anticoagulation due to anemia.  Anemia of CKD-on EPO injections  Hypothyroidism-Synthroid  Stage IV breast cancer with malignant ascites-on abemaciclib and faslodex as an outpatient  Thickened appearance of gastric antrum on CT-on IV PPI, though this could also be evidence of metastatic disease and general surgery is considering an EGD to evaluate  ANDREW-PAP if available  SCDs for DVT prophylaxis.  Full code.  Discussed with patient.  Anticipate discharge  TBD  timing yet to be determined.      Nicolás Rose MD  [facility]  10/22/23  11:45 EDT    I wore protective  equipment throughout this patient encounter including a face mask, gloves and protective eyewear.  Hand hygiene was performed before donning protective equipment and after removal when leaving the room.

## 2023-10-22 NOTE — PLAN OF CARE
Goal Outcome Evaluation:  Plan of Care Reviewed With: patient        Progress: improving  Outcome Evaluation: Pt tolerated treatment fair this date. Required min A for supine to sit, then stood 3x w/ mod-max A x2. Pt was unable to move feet to take steps, though very motivated to get to the chair. Pt needed to be returned to supine w/ max-dep A x2 d/t beginning to slide off EOB. Pt was safely returned to supine and scooted up towards HOB. Encouraged pt to continue working on exercises in bed.      Anticipated Discharge Disposition (PT): skilled nursing facility

## 2023-10-22 NOTE — NURSING NOTE
VSS afebrile, accuchecks within limits that did not need insulin coverage. NGT output at 2000 was 300ml, NGT returned to LIS, not discontinued. No complaints of pain, just very disappointed about NGT. Updated on plan of care, no questions or complaints. WCTM.

## 2023-10-22 NOTE — PLAN OF CARE
Goal Outcome Evaluation:  Plan of Care Reviewed With: patient        Progress: improving  Outcome Evaluation: Pt with no reports of n/v. C/o R sided abdominal pain with turning. Yeast and moisture excess noted in abdominal folds- area cleaned and antifungal powder applied. Mepilex changed to intragluteal and L posterior leg wounds. NGT clamped since about 1000 this shift and tolerating well. Very small bm this shift- liquidy. VSS. Safety maintained.

## 2023-10-23 ENCOUNTER — HOSPITAL ENCOUNTER (OUTPATIENT)
Dept: PET IMAGING | Facility: HOSPITAL | Age: 63
Discharge: HOME | End: 2023-10-23
Payer: MEDICARE

## 2023-10-23 LAB
ANION GAP SERPL CALCULATED.3IONS-SCNC: 12 MMOL/L (ref 5–15)
BASOPHILS # BLD AUTO: 0.04 10*3/MM3 (ref 0–0.2)
BASOPHILS NFR BLD AUTO: 0.5 % (ref 0–1.5)
BUN SERPL-MCNC: 36 MG/DL (ref 8–23)
BUN/CREAT SERPL: 6.1 (ref 7–25)
CALCIUM SPEC-SCNC: 8.8 MG/DL (ref 8.6–10.5)
CHLORIDE SERPL-SCNC: 95 MMOL/L (ref 98–107)
CO2 SERPL-SCNC: 27 MMOL/L (ref 22–29)
CREAT SERPL-MCNC: 5.9 MG/DL (ref 0.57–1)
DEPRECATED RDW RBC AUTO: 55.2 FL (ref 37–54)
EGFRCR SERPLBLD CKD-EPI 2021: 7.4 ML/MIN/1.73
EOSINOPHIL # BLD AUTO: 0.12 10*3/MM3 (ref 0–0.4)
EOSINOPHIL NFR BLD AUTO: 1.4 % (ref 0.3–6.2)
ERYTHROCYTE [DISTWIDTH] IN BLOOD BY AUTOMATED COUNT: 15.7 % (ref 12.3–15.4)
GLUCOSE BLDC GLUCOMTR-MCNC: 146 MG/DL (ref 70–130)
GLUCOSE BLDC GLUCOMTR-MCNC: 159 MG/DL (ref 70–130)
GLUCOSE BLDC GLUCOMTR-MCNC: 163 MG/DL (ref 70–130)
GLUCOSE BLDC GLUCOMTR-MCNC: 206 MG/DL (ref 70–130)
GLUCOSE BLDC GLUCOMTR-MCNC: 212 MG/DL (ref 70–130)
GLUCOSE SERPL-MCNC: 150 MG/DL (ref 65–99)
HCT VFR BLD AUTO: 26.8 % (ref 34–46.6)
HGB BLD-MCNC: 8.9 G/DL (ref 12–15.9)
IMM GRANULOCYTES # BLD AUTO: 0.38 10*3/MM3 (ref 0–0.05)
IMM GRANULOCYTES NFR BLD AUTO: 4.5 % (ref 0–0.5)
LYMPHOCYTES # BLD AUTO: 0.78 10*3/MM3 (ref 0.7–3.1)
LYMPHOCYTES NFR BLD AUTO: 9.2 % (ref 19.6–45.3)
MCH RBC QN AUTO: 31.9 PG (ref 26.6–33)
MCHC RBC AUTO-ENTMCNC: 33.2 G/DL (ref 31.5–35.7)
MCV RBC AUTO: 96.1 FL (ref 79–97)
MONOCYTES # BLD AUTO: 0.77 10*3/MM3 (ref 0.1–0.9)
MONOCYTES NFR BLD AUTO: 9.1 % (ref 5–12)
NEUTROPHILS NFR BLD AUTO: 6.39 10*3/MM3 (ref 1.7–7)
NEUTROPHILS NFR BLD AUTO: 75.3 % (ref 42.7–76)
NRBC BLD AUTO-RTO: 0.2 /100 WBC (ref 0–0.2)
PLATELET # BLD AUTO: 192 10*3/MM3 (ref 140–450)
PMV BLD AUTO: 10.4 FL (ref 6–12)
POTASSIUM SERPL-SCNC: 4 MMOL/L (ref 3.5–5.2)
RBC # BLD AUTO: 2.79 10*6/MM3 (ref 3.77–5.28)
SODIUM SERPL-SCNC: 134 MMOL/L (ref 136–145)
WBC NRBC COR # BLD: 8.48 10*3/MM3 (ref 3.4–10.8)

## 2023-10-23 PROCEDURE — 97110 THERAPEUTIC EXERCISES: CPT

## 2023-10-23 PROCEDURE — 85025 COMPLETE CBC W/AUTO DIFF WBC: CPT | Performed by: HOSPITALIST

## 2023-10-23 PROCEDURE — 99232 SBSQ HOSP IP/OBS MODERATE 35: CPT | Performed by: SURGERY

## 2023-10-23 PROCEDURE — 63710000001 INSULIN LISPRO (HUMAN) PER 5 UNITS: Performed by: INTERNAL MEDICINE

## 2023-10-23 PROCEDURE — 63710000001 INSULIN LISPRO (HUMAN) PER 5 UNITS: Performed by: STUDENT IN AN ORGANIZED HEALTH CARE EDUCATION/TRAINING PROGRAM

## 2023-10-23 PROCEDURE — 97530 THERAPEUTIC ACTIVITIES: CPT

## 2023-10-23 PROCEDURE — 82948 REAGENT STRIP/BLOOD GLUCOSE: CPT

## 2023-10-23 PROCEDURE — 80048 BASIC METABOLIC PNL TOTAL CA: CPT | Performed by: INTERNAL MEDICINE

## 2023-10-23 PROCEDURE — 25010000002 EPOETIN ALFA-EPBX 20000 UNIT/ML SOLUTION: Performed by: INTERNAL MEDICINE

## 2023-10-23 PROCEDURE — 99232 SBSQ HOSP IP/OBS MODERATE 35: CPT | Performed by: INTERNAL MEDICINE

## 2023-10-23 RX ORDER — INSULIN LISPRO 100 [IU]/ML
2-9 INJECTION, SOLUTION INTRAVENOUS; SUBCUTANEOUS
Status: DISCONTINUED | OUTPATIENT
Start: 2023-10-24 | End: 2023-10-23

## 2023-10-23 RX ORDER — INSULIN LISPRO 100 [IU]/ML
2-9 INJECTION, SOLUTION INTRAVENOUS; SUBCUTANEOUS
Status: DISCONTINUED | OUTPATIENT
Start: 2023-10-23 | End: 2023-10-26 | Stop reason: HOSPADM

## 2023-10-23 RX ADMIN — ATORVASTATIN CALCIUM 40 MG: 20 TABLET, FILM COATED ORAL at 21:42

## 2023-10-23 RX ADMIN — POTASSIUM CHLORIDE 20 MEQ: 750 TABLET, EXTENDED RELEASE ORAL at 17:41

## 2023-10-23 RX ADMIN — INSULIN LISPRO 4 UNITS: 100 INJECTION, SOLUTION INTRAVENOUS; SUBCUTANEOUS at 21:42

## 2023-10-23 RX ADMIN — Medication 1000 MCG: at 10:56

## 2023-10-23 RX ADMIN — MIDODRINE HYDROCHLORIDE 5 MG: 5 TABLET ORAL at 17:41

## 2023-10-23 RX ADMIN — MIDODRINE HYDROCHLORIDE 5 MG: 5 TABLET ORAL at 06:35

## 2023-10-23 RX ADMIN — SERTRALINE 150 MG: 100 TABLET, FILM COATED ORAL at 10:56

## 2023-10-23 RX ADMIN — Medication 1 TABLET: at 10:57

## 2023-10-23 RX ADMIN — INSULIN LISPRO 4 UNITS: 100 INJECTION, SOLUTION INTRAVENOUS; SUBCUTANEOUS at 17:41

## 2023-10-23 RX ADMIN — EPOETIN ALFA-EPBX 20000 UNITS: 20000 INJECTION, SOLUTION INTRAVENOUS; SUBCUTANEOUS at 10:56

## 2023-10-23 RX ADMIN — PANTOPRAZOLE SODIUM 40 MG: 40 INJECTION, POWDER, FOR SOLUTION INTRAVENOUS at 06:35

## 2023-10-23 RX ADMIN — MUPIROCIN 1 APPLICATION: 20 OINTMENT TOPICAL at 09:42

## 2023-10-23 RX ADMIN — Medication 10 ML: at 10:57

## 2023-10-23 RX ADMIN — FOLIC ACID 1 MG: 1 TABLET ORAL at 10:57

## 2023-10-23 RX ADMIN — GABAPENTIN 300 MG: 300 CAPSULE ORAL at 21:42

## 2023-10-23 RX ADMIN — Medication 10 ML: at 21:43

## 2023-10-23 RX ADMIN — LEVOTHYROXINE SODIUM 50 MCG: 50 TABLET ORAL at 06:35

## 2023-10-23 RX ADMIN — MIDODRINE HYDROCHLORIDE 5 MG: 5 TABLET ORAL at 10:58

## 2023-10-23 NOTE — PROGRESS NOTES
Subjective     CHIEF COMPLAINT:     Metastatic breast cancer on Faslodex and abemaciclib  Severe aortic stenosis    INTERVAL HISTORY:     Patient had her NG tube removed earlier today.  She is feeling better since the removal of the NG tube.  No abdominal pain although abdomen feels distended.    REVIEW OF SYSTEMS:  A comprehensive review of systems was obtained with pertinent positive findings as noted in the interval history above.  All other systems negative.    SCHEDULED MEDS:  Abemaciclib, 100 mg, Oral, BID  albumin human, 12.5 g, Intravenous, Once  atorvastatin, 40 mg, Oral, Nightly  diatrizoate meglumine-sodium, 30 mL, Oral, Once  epoetin jana/jana-epbx, 20,000 Units, Subcutaneous, Once per day on Mon Wed Fri  folic acid, 1 mg, Oral, Daily  gabapentin, 300 mg, Oral, Nightly  insulin lispro, 2-9 Units, Subcutaneous, Q4H  levothyroxine, 50 mcg, Oral, Q AM  midodrine, 5 mg, Oral, TID AC  multivitamin, 1 tablet, Oral, Daily  mupirocin, 1 application , Topical, Daily  pantoprazole, 40 mg, Intravenous, Q AM  polyethylene glycol, 17 g, Oral, Daily  potassium chloride, 20 mEq, Oral, Daily  senna-docusate sodium, 2 tablet, Oral, BID  sertraline, 150 mg, Oral, Daily  sodium chloride, 10 mL, Intravenous, Q12H  vitamin B-12, 1,000 mcg, Oral, Daily      Objective   VITAL SIGNS:  Temp:  [97.3 °F (36.3 °C)-98.7 °F (37.1 °C)] 97.3 °F (36.3 °C)  Heart Rate:  [87-98] 98  Resp:  [18] 18  BP: ()/(40-74) 129/74     PHYSICAL EXAMINATION:  GENERAL:  The patient appears in fair general condition, not in acute distress.  SKIN: No skin rash.   EYES:  No Jaundice. Pallor.   CHEST: Normal respiratory effort. Lungs clear to auscultation.    CARDIAC:  Normal S1 & S2. No murmur.   ABDOMEN: Distended.  Fullness in the central aspect of the abdomen.    RESULT REVIEW:   Results from last 7 days   Lab Units 10/23/23  0406 10/22/23  0401 10/21/23  0353 10/20/23  0534 10/19/23  0413   WBC 10*3/mm3 8.48 8.53 8.18 7.93 8.92   NEUTROS ABS  10*3/mm3 6.39 6.17 6.87 5.96 6.33   LYMPHS ABS 10*3/mm3  --   --  0.74  --   --    HEMOGLOBIN g/dL 8.9* 9.8* 8.9* 8.4* 9.2*   HEMATOCRIT % 26.8* 30.7* 28.0* 26.5* 28.7*   PLATELETS 10*3/mm3 192 185 163 161 157     Results from last 7 days   Lab Units 10/23/23  0405 10/22/23  0401 10/21/23  0353 10/20/23  0534 10/19/23  0413   SODIUM mmol/L 134* 135* 139 140 140   POTASSIUM mmol/L 4.0 4.3 4.0 3.5 3.8   CHLORIDE mmol/L 95* 96* 99 97* 100   CO2 mmol/L 27.0 23.0 25.4 25.0 25.0   BUN mg/dL 36* 26* 33* 40* 27*   CREATININE mg/dL 5.90* 4.67* 5.73* 6.60* 4.94*   CALCIUM mg/dL 8.8 8.7 8.2* 8.5* 8.5*   ALBUMIN g/dL  --  2.0*  --   --   --    BILIRUBIN mg/dL  --  0.5  --   --   --    ALK PHOS U/L  --  101  --   --   --    ALT (SGPT) U/L  --  12  --   --   --    AST (SGOT) U/L  --  18  --   --   --      Component      Latest Ref Rn 3/16/2023 10/16/2023   CA 15-3      <=25.0 U/mL 27.0 (H)  21.3        Assessment & Plan   *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  Patient follows with Dr. Irvin at our office.  PET scan on 7/3/2023 revealed metastatic disease to bone.  There was hypermetabolic ascites with soft tissue density in the anterior pelvis-possible peritoneal carcinomatosis.  Soft tissue thickening at the left renal pelvis.  Paracentesis on 10/9/2023-rare atypical cells suspicious for lobular carcinoma.  Were reported as ER/[place] negative.  Patient is on combination regimen with Faslodex monthly and abemaciclib 100 mg twice daily.  She was going to have follow-up PET scan on 10/23/2023-on hold due to admission.    *Ileus versus small bowel obstruction.  Patient had NG tube placed.  CT revealed thickening of the antrum of the stomach.  She was started on Protonix.  NG was removed on 10/23/2023.  No recurrence of the symptoms after removal of the NG tube.    *End-stage renal failure.  She is on hemodialysis.  She has anemia secondary to end-stage renal disease.  She receives Retacrit 3 days a week.       PLAN:    1.  Continue abemaciclib 100 mg twice daily.  2.  She will resume Faslodex after discharge from the hospital.  3.  Daily CBC.      Bogdan Perez MD  10/23/23

## 2023-10-23 NOTE — PLAN OF CARE
Goal Outcome Evaluation:  Plan of Care Reviewed With: patient        Progress: improving  Outcome Evaluation: Pt agreed to PT/OT session, pt motivated to get into chair this session, pt perf STS w/MOD2, then MOD/MAX 2 to pivot to chair; pt required cues to continue moving toward chair, heavy use of gt belt required, pt will need SNU prior to return home , has had multiple falls and is well below baseline functioning , not safe to return home at current assist level      Anticipated Discharge Disposition (PT): skilled nursing facility

## 2023-10-23 NOTE — THERAPY TREATMENT NOTE
Patient Name: Juana Hall  : 1960    MRN: [id number]                              Today's Date: 10/23/2023       Admit Date: 10/13/2023    Visit Dx:     ICD-10-CM ICD-9-CM   1. Hypotension, unspecified hypotension type  I95.9 458.9   2. Acute UTI  N39.0 599.0   3. End-stage renal disease on hemodialysis  N18.6 585.6    Z99.2 V45.11   4. Other fracture of left foot, initial encounter for closed fracture  S92.812A 825.20   5. Anemia in stage 3 chronic kidney disease, unspecified whether stage 3a or 3b CKD  N18.30 285.21    D63.1 585.3     Patient Active Problem List   Diagnosis    Anxiety    Depression    Diabetes type 2, controlled    Hyperlipidemia    Heart murmur    Hypertension    Post-traumatic osteoarthritis of multiple joints    Psoriasis    Radiculopathy    Acquired hypothyroidism    Peripheral neuropathy    Normocytic anemia    History of right breast cancer    Omental mass    Primary malignant neoplasm of breast with metastasis    Elevated serum creatinine    Iron deficiency anemia    Anemia in stage 3 chronic kidney disease    Stage 3b chronic kidney disease    Anxiety and depression    RYAN (acute kidney injury)    Anemia, chronic disease    Diastolic CHF, chronic    Frequent UTI    Metabolic acidosis    Severe malnutrition    Hydronephrosis    Generalized weakness    COVID-19 virus detected    Hypocalcemia    Morbid obesity with BMI of 50.0-59.9, adult    ESRD (end stage renal disease)    Pancytopenia due to chemotherapy    Chronic anemia    Rectal bleeding    Bicuspid aortic valve    Complicated UTI (urinary tract infection)    Acute UTI (urinary tract infection)    Severe aortic stenosis    Combined systolic and diastolic congestive heart failure    Depression    ANDREW (obstructive sleep apnea)    Diastolic CHF, chronic    Adverse effect of chemotherapy    UTI (urinary tract infection)    Hematuria    Dysuria    Intractable vomiting    Hypotension    Abnormal urinalysis    Hypokalemia     Past  Medical History:   Diagnosis Date    Anemia     Anxiety and depression     Bicuspid aortic valve     had surgery    Breast cancer 2004    RIGHT, HAD NEELA. 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    History of sepsis 2010    KIDNEY STONE    History of transfusion     no reaction    Hyperlipidemia     Hyperthyroidism     Hypothyroidism     Kidney stone     CURRENT LEFT-DEC 2021    Lymphedema of leg     LEFT    Metastasis from breast cancer 2019    STOMACH-OMENTUM    Neuromuscular disorder     Obesity     ANDREW on CPAP     CPAP    Osteoarthrosis     Peripheral neuropathy     FEET BILAT    Radiculopathy     Rectal bleeding 08/16/2022    Thickened endometrium     Urinary incontinence     wears pads    Weakness     BILAT LEGS-WITH ANY AMT OF TIME     Past Surgical History:   Procedure Laterality Date    AORTIC VALVULOPLASTY  01/2023    ARTERIOVENOUS FISTULA/SHUNT SURGERY Left 11/03/2021    Procedure: LEFT  BRACHIOCEPALIC ARTERIOVENOUS FISTULA;  Surgeon: Dejuan Adler MD;  Location: [facility] MAIN OR;  Service: Vascular;  Laterality: Left;    BREAST RECONSTRUCTION  2004    WITH IMPLANTS, AND REVISION, NOW REMOVED    BREAST SURGERY Bilateral 2004    breast implants removed and then replaced due to infection    CARDIAC CATHETERIZATION N/A 08/23/2022    Procedure: CORONARY ANGIOGRAPHY;  Surgeon: Luis Rivers MD;  Location: [facility] CATH INVASIVE LOCATION;  Service: Cardiology;  Laterality:  N/A;    CARDIAC CATHETERIZATION N/A 2022    Procedure: Right Heart Cath;  Surgeon: Luis Rivers MD;  Location: [facility] CATH INVASIVE LOCATION;  Service: Cardiology;  Laterality: N/A;    CARDIAC CATHETERIZATION N/A 01/10/2023    Procedure: Valvuloplasty;  Surgeon: Luis Rivers MD;  Location: [facility] CATH INVASIVE LOCATION;  Service: Cardiovascular;  Laterality: N/A;  01/10/2023    CARDIAC CATHETERIZATION N/A 01/10/2023    Procedure: Aortic root aortogram;  Surgeon: Luis Rivers MD;  [facility];  Service: Cardiovascular;  Laterality: N/A;    CATARACT EXTRACTION WITH INTRAOCULAR LENS IMPLANT Bilateral      SECTION  1987     SECTION  1987    CYSTOSCOPY W/ URETERAL STENT PLACEMENT      CYSTOSCOPY W/ URETERAL STENT PLACEMENT Left 2021    Procedure: CYSTOSCOPY KEFT RETROGRADE PYELOGRAM  LEFT  URETERAL STENT PLACEMENT;  Surgeon: Leodan Mckee Jr., MD;  Location: [facility] OR;  Service: Urology;  Laterality: Left;    CYSTOSCOPY W/ URETERAL STENT PLACEMENT Left 03/10/2022    Procedure: CYSTOSCOPY AND LEFT URETERAL STENT EXCHANGE, RETROGRADE PYELOGRAM;  Surgeon: Leodan Mckee Jr., MD;  Location: [facility] OR;  Service: Urology;  Laterality: Left;    CYSTOSCOPY W/ URETERAL STENT PLACEMENT Left 2023    Procedure: CYSTOSCOPY WITH LEFT URETERAL STENT PLACEMENT, RETROGRADE PYELOGRAM, CLOT EVACUATION, BLADDER FULGARATION;  Surgeon: Leodan Mckee Jr., MD;  Location: [facility] MAIN OR;  Service: Urology;  Laterality: Left;    D & C HYSTEROSCOPY N/A 2017    Procedure: DILATATION AND CURETTAGE, HYSTEROSCOPY ;  Surgeon: Cherie Oliveros MD;  Location: [facility] OR OSC;  Service:     D & C HYSTEROSCOPY N/A 2019    Procedure: DILATATION AND CURETTAGE HYSTEROSCOPY/POLYPECTOMY;  Surgeon: Cherie Oliveros MD;  [facility];  Service: Gynecology    D & C HYSTEROSCOPY ENDOMETRIAL ABLATION N/A  5/5/2023    Procedure: DILATATION AND CURETTAGE;  Surgeon: Ina Marcos MD;  Location: [facility] OR;  Service: Obstetrics/Gynecology;  Laterality: N/A;    ENDOSCOPY      INSERTION AND REMOVAL HEMODIALYSIS CATHETER Right 05/01/2021    INSERTION HEMODIALYSIS CATHETER Right 05/01/2021    Procedure: HEMODIALYSIS CATHETER INSERTION;  Surgeon: Clint Hernández MD;  Location: [facility] OR;  Service: Vascular;  Laterality: Right;    LYMPH NODE BIOPSY      MASTECTOMY Bilateral 2004    VENOUS ACCESS DEVICE (PORT) INSERTION AND REMOVAL        General Information       Row Name 10/23/23 1544          Physical Therapy Time and Intention    Document Type therapy note (daily note)  -     Mode of Treatment co-treatment;physical therapy;occupational therapy  -       Row Name 10/23/23 1544          General Information    Patient Profile Reviewed yes  -     Existing Precautions/Restrictions fall  -       Row Name 10/23/23 1544          Living Environment    People in Home --  spouse unable to assist due to back injury assisting pt at home-not any physical assist to pt at present  -       Row Name 10/23/23 1544          Cognition    Orientation Status (Cognition) oriented x 4  -       Row Name 10/23/23 1544          Safety Issues, Functional Mobility    Safety Issues Affecting Function (Mobility) problem-solving;safety precaution awareness;sequencing abilities  -     Impairments Affecting Function (Mobility) balance;coordination;endurance/activity tolerance;motor control;strength  -     Comment, Safety Issues/Impairments (Mobility) incr time required , rests required  -               User Key  (r) = Recorded By, (t) = Taken By, (c) = Cosigned By      Initials Name Provider Type     Ana Parisi PTA Physical Therapist Assistant                   Mobility       Row Name 10/23/23 1548          Bed Mobility    Scooting/Bridging [place] (Bed Mobility) 2 person assist;moderate assist (50% patient  effort)  -     Supine-Sit [place] (Bed Mobility) 2 person assist;moderate assist (50% patient effort);verbal cues  -     Sit-Supine [place] (Bed Mobility) 2 person assist;dependent (less than 25% patient effort)  required 2 attempts, pt weak, bed difficult to mobilize (jayden bed and specialty mattress)  -     Assistive Device (Bed Mobility) bed rails;draw sheet;head of bed elevated  -       Row Name 10/23/23 1548          Bed-Chair Transfer    Bed-Chair [place] (Transfers) 2 person assist;moderate assist (50% patient effort);verbal cues;nonverbal cues (demo/gesture)  -     Comment, (Bed-Chair Transfer) very motivated bed-chair, but returned to assist nsg chair to bed and pt was DEP 2 , with 2 attempts required, had BM on way back to bed  -       Row Name 10/23/23 1548          Sit-Stand Transfer    Sit-Stand [place] (Transfers) 2 person assist;moderate assist (50% patient effort);maximum assist (25% patient effort);verbal cues;nonverbal cues (demo/gesture)  -     Assistive Device (Sit-Stand Transfers) --  HHA-2  -               User Key  (r) = Recorded By, (t) = Taken By, (c) = Cosigned By      Initials Name Provider Type    Ana Martinez PTA Physical Therapist Assistant                   Obj/Interventions    No documentation.                  Goals/Plan    No documentation.                  Clinical Impression       Row Name 10/23/23 1554          Pain    Pretreatment Pain Rating 0/10 - no pain  -     Posttreatment Pain Rating 0/10 - no pain  -       Row Name 10/23/23 3457          Plan of Care Review    Plan of Care Reviewed With patient  -     Progress improving  -     Outcome Evaluation Pt agreed to PT/OT session, pt motivated to get into chair this session, pt perf STS w/MOD2, then MOD/MAX 2 to pivot to chair; pt required cues to continue moving toward chair, heavy use of gt belt required, pt will need SNU prior to return home , has had multiple falls and is  well below baseline functioning , not safe to return home at current assist level  -       Row Name 10/23/23 1552          Therapy Assessment/Plan (PT)    Rehab Potential (PT) fair, will monitor progress closely  -     Criteria for Skilled Interventions Met (PT) yes  -       Row Name 10/23/23 1552          Vital Signs    O2 Delivery Pre Treatment room air  -       Row Name 10/23/23 1552          Positioning and Restraints    Pre-Treatment Position in bed  -     Post Treatment Position chair  -     In Chair reclined;call light within reach;encouraged to call for assist;with family/caregiver;notified nsg  -               User Key  (r) = Recorded By, (t) = Taken By, (c) = Cosigned By      Initials Name Provider Type    Ana Martinez PTA Physical Therapist Assistant                   Outcome Measures       Row Name 10/23/23 1556          How much help from another person do you currently need...    Turning from your back to your side while in flat bed without using bedrails? 2  -JM     Moving from lying on back to sitting on the side of a flat bed without bedrails? 2  -JM     Moving to and from a bed to a chair (including a wheelchair)? 2  -JM     Standing up from a chair using your arms (e.g., wheelchair, bedside chair)? 2  -JM     Climbing 3-5 steps with a railing? 1  -JM     To walk in hospital room? 1  -     AM-PAC 6 Clicks Score (PT) 10  -     Highest level of mobility 4 --> Transferred to chair/commode  -       Row Name 10/23/23 1549          Functional Assessment    Outcome Measure Options AM-PAC 6 Clicks Daily Activity (OT)  -VERÓNICA               User Key  (r) = Recorded By, (t) = Taken By, (c) = Cosigned By      Initials Name Provider Type    Ana Martinez PTA Physical Therapist Assistant    Glenis Laureano OT Occupational Therapist                                 Physical Therapy Education       Title: PT OT SLP Therapies (Done)       Topic: Physical Therapy (Done)        Point: Mobility training (Done)       Learning Progress Summary             Patient Eager, E,TB,D, VU,NR by ARDIA at 10/23/2023 1557    Acceptance, E,TB,D, VU,NR by SM at 10/22/2023 1504    Eager, E,TB,D, VU by ADRIA at 10/20/2023 1345    Acceptance, E, NR by AR at 10/18/2023 1549    Acceptance, E,D, DU by PC at 10/16/2023 1629    Acceptance, E, VU by BL at 10/14/2023 0630   Family Eager, E,TB,D, VU,NR by ARDIA at 10/23/2023 1557    Eager, E,TB,D, VU by ADRIA at 10/20/2023 1345                         Point: Home exercise program (Done)       Learning Progress Summary             Patient Eager, E,TB,D, VU,NR by ADRIA at 10/23/2023 1557    Acceptance, E,TB,D, VU,NR by ALFA at 10/22/2023 1504    Eager, E,TB,D, VU by ADRIA at 10/20/2023 1345    Acceptance, E, NR by AR at 10/18/2023 1549    Acceptance, E,D, DU by PC at 10/16/2023 1629    Acceptance, E, VU by BL at 10/14/2023 0630   Family Eager, E,TB,D, VU,NR by  at 10/23/2023 1557    Eager, E,TB,D, VU by  at 10/20/2023 1345                         Point: Body mechanics (Done)       Learning Progress Summary             Patient Eager, E,TB,D, VU,NR by ADRIA at 10/23/2023 1557    Acceptance, E,TB,D, VU,NR by  at 10/22/2023 1504    Eager, E,TB,D, VU by ADRIA at 10/20/2023 1345    Acceptance, E, NR by AR at 10/18/2023 1549    Acceptance, E,D, DU by PC at 10/16/2023 1629    Acceptance, E, VU by BL at 10/14/2023 0630   Family Eager, E,TB,D, VU,NR by ADRIA at 10/23/2023 1557    Eager, E,TB,D, VU by ADRIA at 10/20/2023 1345                         Point: Precautions (Done)       Learning Progress Summary             Patient Eager, E,TB,D, VU,NR by ADRIA at 10/23/2023 1557    Acceptance, E,TB,D, VU,NR by SM at 10/22/2023 1504    Eager, E,TB,D, VU by ADRIA at 10/20/2023 1345    Acceptance, E, NR by AR at 10/18/2023 1549    Acceptance, E,D, DU by PC at 10/16/2023 1629    Acceptance, E,D, VU,NR by MG at 10/15/2023 1148    Acceptance, E, VU by BL at 10/14/2023 0630   Family Eager, E,TB,D, VU,NR by ADRIA at  10/23/2023 1557    DEMAR Villatoro,TB,D, VU by  at 10/20/2023 1345                                         User Key       Initials Effective Dates Name Provider Type Discipline    PC 06/16/21 -  Sabi Kevin, PT Physical Therapist PT    ADRIA 03/07/18 -  Ana Parisi PTA Physical Therapist Assistant PT    AR 06/16/21 -  Maria E Negrete, PT Physical Therapist PT     03/07/18 -  Apolonia Parisi PTA Physical Therapist Assistant PT    MG 05/24/22 -  Karla Bhatia, PT Physical Therapist PT    BL 10/18/22 -  Zainab Grace, RN Registered Nurse Nurse                  PT Recommendation and Plan     Plan of Care Reviewed With: patient  Progress: improving  Outcome Evaluation: Pt agreed to PT/OT session, pt motivated to get into chair this session, pt perf STS w/MOD2, then MOD/MAX 2 to pivot to chair; pt required cues to continue moving toward chair, heavy use of gt belt required, pt will need SNU prior to return home , has had multiple falls and is well below baseline functioning , not safe to return home at current assist level     Time Calculation:         PT Charges       Row Name 10/23/23 1557             Time Calculation    Start Time 1314  -      Stop Time 1343  -      Time Calculation (min) 29 min  -      PT Received On 10/23/23  -ADRIA      PT - Next Appointment 10/24/23  -ADRIA                User Key  (r) = Recorded By, (t) = Taken By, (c) = Cosigned By      Initials Name Provider Type     Ana Parisi PTA Physical Therapist Assistant                  Therapy Charges for Today       Code Description Service Date Service Provider Modifiers Qty    72444162674 HC PT THER PROC EA 15 MIN 10/23/2023 Ana Parisi PTA GP 2    35005550370 HC PT THER SUPP EA 15 MIN 10/23/2023 Ana Parisi PTA GP 2            PT G-Codes  Outcome Measure Options: AM-PAC 6 Clicks Daily Activity (OT)  AM-PAC 6 Clicks Score (PT): 10  AM-PAC 6 Clicks Score (OT): 12  PT Discharge Summary  Anticipated Discharge  Disposition (PT): skilled nursing facility    Ana Parisi, PTA  10/23/2023

## 2023-10-23 NOTE — CASE MANAGEMENT/SOCIAL WORK
Continued Stay Note  [facility]     Patient Name: Juana Hall  MRN: [id number]  Today's Date: 10/23/2023    Admit Date: 10/13/2023    Plan: DC to [facility] when medically stable, will  need ambulance transport   Discharge Plan       Row Name 10/23/23 1504       Plan    Plan DC to [facility] when medically stable, will  need ambulance transport    Plan Comments Met brieflly with pt, who is currently up in chair. Stated she was looking forward to being dc to [facility] and moving forward with her recovery. No other needs voiced at this time.                   Discharge Codes    No documentation.                 Expected Discharge Date and Time       Expected Discharge Date Expected Discharge Time    Oct 24, 2023               Anjali Cerrato RN

## 2023-10-23 NOTE — PLAN OF CARE
Goal Outcome Evaluation:              Outcome Evaluation: WAS ABLE TO TOLERATE N/G TUBE CLAMPED ALL NIGHT WITHOUT NAUSEA. UNEVENTFUL NIGHT..

## 2023-10-23 NOTE — CASE MANAGEMENT/SOCIAL WORK
Continued Stay Note  [facility]     Patient Name: Juana Hall  MRN: [id number]  Today's Date: 10/23/2023    Admit Date: 10/13/2023    Plan: DC to [facility] when medically cleared.   Discharge Plan       Row Name 10/23/23 0907       Plan    Plan DC to [facility] when medically cleared.    Plan Comments Tejinder requested information faxed to [phone], to set up dialysis at [facility]. Requested information faxed to Tejinder per request.                   Discharge Codes    No documentation.                 Expected Discharge Date and Time       Expected Discharge Date Expected Discharge Time    Oct 24, 2023               Anjali Cerrato, RN

## 2023-10-23 NOTE — PLAN OF CARE
Goal Outcome Evaluation:  Plan of Care Reviewed With: patient           Outcome Evaluation: Pt seen today for session. Demo improvement with functional mobility and able to transfer to chair with assist from OT/PT. Pt was very motivated and eager to attempt chair transfer. She stood with mod-max Ax2 and transferred to chair with mod Ax2 and HHAx2. Provided pt with yellow theraband to work on UE strengthening exercises while seated in the chair. Before transferring to chair pt worked on AROM of BUE shoulders and elbows. Politely declined particpation with ADLs. Pt continues to benefit from skilled OT to address deficits.

## 2023-10-23 NOTE — PROGRESS NOTES
LOS: 9 days     Chief Complaint/ Reason for encounter: ESRD management    Subjective   10/19/23 : She feels a little better today  Still with frequent bowel movements but less diarrhea  BP remains low normal, on midodrine  NG tube remains in place, clamped  She remains n.p.o. with occasional nausea    10/20: NG tube remains in place, still with copious amounts of NG output  Not as much diarrhea, no abdominal pain  Scheduled for dialysis today.  Weights very labile and do not seem accurate to me    10/21: She complained of abdominal pain and nausea and only received an hour and a half of her dialysis treatment.  I tried to convince her to complete another hour but patient refused  Scheduled for additional dialysis today  NG tube currently clamped    10/22, about the same today, high NG tube output, continues to have diarrhea  Did tolerate dialysis yesterday after receiving albumin and midodrine for blood pressure support, 3.4 L of fluid removed    10/23: Uneventful night was able to tolerate having her NG tube clamped most of the night without nausea, surgery continues to follow and is considering EGD if high output NG tube persists  HD planned for tomorrow  Plan is for outpatient dialysis at [facility] after [place]    Medical history reviewed:  History of Present Illness    Subjective    History taken from: Patient and chart    Vital Signs  Temp:  [97.3 °F (36.3 °C)-98.7 °F (37.1 °C)] 97.3 °F (36.3 °C)  Heart Rate:  [87-98] 98  Resp:  [18] 18  BP: ()/(40-74) 129/74       Wt Readings from Last 1 Encounters:   10/23/23 0322 (!) 160 kg (353 lb 4.8 oz)   10/21/23 0527 (!) 164 kg (360 lb 11.2 oz)   10/20/23 0457 (!) 167 kg (369 lb 1.6 oz)   10/19/23 0500 (!) 162 kg (356 lb 11.2 oz)   10/18/23 0510 (!) 166 kg (365 lb)   10/17/23 0537 (!) 168 kg (371 lb 1.6 oz)   10/14/23 1634 (!) 176 kg (387 lb)   10/14/23 0000 (!) 176 kg (387 lb 6.4 oz)   10/13/23 1251 (!) 154 kg (340 lb)       Objective:  Vital signs: (most  "recent): Blood pressure 129/74, pulse 98, temperature 97.3 °F (36.3 °C), temperature source Oral, resp. rate 18, height 170.2 cm (67\"), weight (!) 160 kg (353 lb 4.8 oz), SpO2 100%, not currently breastfeeding.                Objective:  General Appearance:  Comfortable, obese, ill-appearing, in no acute distress and not in pain.  Awake, alert, oriented  HEENT: Mucous membranes moist, no injury, oropharynx clear, NG tube in place with high output  Lungs:  Normal effort and normal respiratory rate.  Breath sounds clear to auscultation.  No  respiratory distress.  No rales, decreased breath sounds or rhonchi.    Heart: Normal rate.  Regular rhythm.  S1, S2 normal.  No murmur.   Abdomen: Abdomen is soft.  Bowel sounds are normal, no abdominal tenderness.  There is no rebound or guarding  Extremities: Trace edema of bilateral lower extremities  Neurological: No focal motor or sensory deficits, pupils reactive  Skin:  Warm and dry.  No rash or cyanosis.       Results Review:    Intake/Output:     Intake/Output Summary (Last 24 hours) at 10/23/2023 1052  Last data filed at 10/22/2023 2315  Gross per 24 hour   Intake 240 ml   Output 425 ml   Net -185 ml         DATA:  Radiology and Labs:  The following labs independently reviewed by me. Additional labs ordered for tomorrow a.m.  Interval notes, chart personally reviewed by me.   Old records independently reviewed showing ESRD on dialysis  Discussed with patient    Risk/ complexity of medical care/ medical decision making moderate, dialysis management    Labs:   Recent Results (from the past 24 hour(s))   POC Glucose Once    Collection Time: 10/22/23 11:27 AM    Specimen: Blood   Result Value Ref Range    Glucose 156 (H) 70 - 130 mg/dL   POC Glucose Once    Collection Time: 10/22/23  4:49 PM    Specimen: Blood   Result Value Ref Range    Glucose 168 (H) 70 - 130 mg/dL   POC Glucose Once    Collection Time: 10/22/23  8:34 PM    Specimen: Blood   Result Value Ref Range    " Glucose 165 (H) 70 - 130 mg/dL   POC Glucose Once    Collection Time: 10/23/23 12:15 AM    Specimen: Blood   Result Value Ref Range    Glucose 146 (H) 70 - 130 mg/dL   Basic Metabolic Panel    Collection Time: 10/23/23  4:05 AM    Specimen: Blood   Result Value Ref Range    Glucose 150 (H) 65 - 99 mg/dL    BUN 36 (H) 8 - 23 mg/dL    Creatinine 5.90 (H) 0.57 - 1.00 mg/dL    Sodium 134 (L) 136 - 145 mmol/L    Potassium 4.0 3.5 - 5.2 mmol/L    Chloride 95 (L) 98 - 107 mmol/L    CO2 27.0 22.0 - 29.0 mmol/L    Calcium 8.8 8.6 - 10.5 mg/dL    BUN/Creatinine Ratio 6.1 (L) 7.0 - 25.0    Anion Gap 12.0 5.0 - 15.0 mmol/L    eGFR 7.4 (L) >60.0 mL/min/1.73   CBC Auto Differential    Collection Time: 10/23/23  4:06 AM    Specimen: Blood   Result Value Ref Range    WBC 8.48 3.40 - 10.80 10*3/mm3    RBC 2.79 (L) 3.77 - 5.28 10*6/mm3    Hemoglobin 8.9 (L) 12.0 - 15.9 g/dL    Hematocrit 26.8 (L) 34.0 - 46.6 %    MCV 96.1 79.0 - 97.0 fL    MCH 31.9 26.6 - 33.0 pg    MCHC 33.2 31.5 - 35.7 g/dL    RDW 15.7 (H) 12.3 - 15.4 %    RDW-SD 55.2 (H) 37.0 - 54.0 fl    MPV 10.4 6.0 - 12.0 fL    Platelets 192 140 - 450 10*3/mm3    Neutrophil % 75.3 42.7 - 76.0 %    Lymphocyte % 9.2 (L) 19.6 - 45.3 %    Monocyte % 9.1 5.0 - 12.0 %    Eosinophil % 1.4 0.3 - 6.2 %    Basophil % 0.5 0.0 - 1.5 %    Immature Grans % 4.5 (H) 0.0 - 0.5 %    Neutrophils, Absolute 6.39 1.70 - 7.00 10*3/mm3    Lymphocytes, Absolute 0.78 0.70 - 3.10 10*3/mm3    Monocytes, Absolute 0.77 0.10 - 0.90 10*3/mm3    Eosinophils, Absolute 0.12 0.00 - 0.40 10*3/mm3    Basophils, Absolute 0.04 0.00 - 0.20 10*3/mm3    Immature Grans, Absolute 0.38 (H) 0.00 - 0.05 10*3/mm3    nRBC 0.2 0.0 - 0.2 /100 WBC   POC Glucose Once    Collection Time: 10/23/23  7:51 AM    Specimen: Blood   Result Value Ref Range    Glucose 163 (H) 70 - 130 mg/dL       Radiology:  Pertinent radiology studies were reviewed as described above    Medications have been reviewed separately in chart  overview    ASSESSMENT:  ESRD on dialysis, Monday Wednesday Friday.  Normally on HHD 5 days a week at home, plan is for inpatient HD after discharge  Metastatic breast cancer  Obesity  Recurrent ascites  Chronic anemia from renal failure and immunotherapy  Hypokalemia  Nausea, vomiting and diarrhea  Chronic hypotension on midodrine  Type 2 diabetes mellitus  Hypothyroidism    Diabetes type 2, controlled    Combined systolic and diastolic congestive heart failure    ANDREW (obstructive sleep apnea)      Plan:   Will keep her on a Tuesday Thursday Saturday HD schedule for now  Minimal UF due to low oral intake and high output NG  Surgery is continuing to follow and is considering EGD  Continue midodrine for blood pressure support, oral potassium daily and Epogen 3 times weekly for recurrent anemia  IV albumin with dialysis as needed  Immunotherapy per hematology/oncology  Patient previously on home HD, noted plans to transition to in center dialysis upon discharge  Will follow     Tai Antonio MD  [facility]   Office phone number: [phone]  Answering service phone number: [phone]    10/23/23  10:52 EDT    Dictation performed using Dragon dictation software

## 2023-10-23 NOTE — THERAPY TREATMENT NOTE
Patient Name: Juana Hall  : 1960    MRN: [id number]                              Today's Date: 10/23/2023       Admit Date: 10/13/2023    Visit Dx:     ICD-10-CM ICD-9-CM   1. Hypotension, unspecified hypotension type  I95.9 458.9   2. Acute UTI  N39.0 599.0   3. End-stage renal disease on hemodialysis  N18.6 585.6    Z99.2 V45.11   4. Other fracture of left foot, initial encounter for closed fracture  S92.812A 825.20   5. Anemia in stage 3 chronic kidney disease, unspecified whether stage 3a or 3b CKD  N18.30 285.21    D63.1 585.3     Patient Active Problem List   Diagnosis    Anxiety    Depression    Diabetes type 2, controlled    Hyperlipidemia    Heart murmur    Hypertension    Post-traumatic osteoarthritis of multiple joints    Psoriasis    Radiculopathy    Acquired hypothyroidism    Peripheral neuropathy    Normocytic anemia    History of right breast cancer    Omental mass    Primary malignant neoplasm of breast with metastasis    Elevated serum creatinine    Iron deficiency anemia    Anemia in stage 3 chronic kidney disease    Stage 3b chronic kidney disease    Anxiety and depression    RYAN (acute kidney injury)    Anemia, chronic disease    Diastolic CHF, chronic    Frequent UTI    Metabolic acidosis    Severe malnutrition    Hydronephrosis    Generalized weakness    COVID-19 virus detected    Hypocalcemia    Morbid obesity with BMI of 50.0-59.9, adult    ESRD (end stage renal disease)    Pancytopenia due to chemotherapy    Chronic anemia    Rectal bleeding    Bicuspid aortic valve    Complicated UTI (urinary tract infection)    Acute UTI (urinary tract infection)    Severe aortic stenosis    Combined systolic and diastolic congestive heart failure    Depression    ANDREW (obstructive sleep apnea)    Diastolic CHF, chronic    Adverse effect of chemotherapy    UTI (urinary tract infection)    Hematuria    Dysuria    Intractable vomiting    Hypotension    Abnormal urinalysis    Hypokalemia     Past  Medical History:   Diagnosis Date    Anemia     Anxiety and depression     Bicuspid aortic valve     had surgery    Breast cancer 2004    RIGHT, HAD NEELA. 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    History of sepsis 2010    KIDNEY STONE    History of transfusion     no reaction    Hyperlipidemia     Hyperthyroidism     Hypothyroidism     Kidney stone     CURRENT LEFT-DEC 2021    Lymphedema of leg     LEFT    Metastasis from breast cancer 2019    STOMACH-OMENTUM    Neuromuscular disorder     Obesity     ANDREW on CPAP     CPAP    Osteoarthrosis     Peripheral neuropathy     FEET BILAT    Radiculopathy     Rectal bleeding 08/16/2022    Thickened endometrium     Urinary incontinence     wears pads    Weakness     BILAT LEGS-WITH ANY AMT OF TIME     Past Surgical History:   Procedure Laterality Date    AORTIC VALVULOPLASTY  01/2023    ARTERIOVENOUS FISTULA/SHUNT SURGERY Left 11/03/2021    Procedure: LEFT  BRACHIOCEPALIC ARTERIOVENOUS FISTULA;  Surgeon: Dejuan Adler MD;  Location: [facility] MAIN OR;  Service: Vascular;  Laterality: Left;    BREAST RECONSTRUCTION  2004    WITH IMPLANTS, AND REVISION, NOW REMOVED    BREAST SURGERY Bilateral 2004    breast implants removed and then replaced due to infection    CARDIAC CATHETERIZATION N/A 08/23/2022    Procedure: CORONARY ANGIOGRAPHY;  Surgeon: Luis Rivers MD;  Location: [facility] CATH INVASIVE LOCATION;  Service: Cardiology;  Laterality:  N/A;    CARDIAC CATHETERIZATION N/A 2022    Procedure: Right Heart Cath;  Surgeon: Luis Rivers MD;  Location: [facility] CATH INVASIVE LOCATION;  Service: Cardiology;  Laterality: N/A;    CARDIAC CATHETERIZATION N/A 01/10/2023    Procedure: Valvuloplasty;  Surgeon: Luis Rivers MD;  Location: [facility] CATH INVASIVE LOCATION;  Service: Cardiovascular;  Laterality: N/A;  01/10/2023    CARDIAC CATHETERIZATION N/A 01/10/2023    Procedure: Aortic root aortogram;  Surgeon: Luis Rivers MD;  Location: [facility] CATH INVASIVE LOCATION;  Service: Cardiovascular;  Laterality: N/A;    CATARACT EXTRACTION WITH INTRAOCULAR LENS IMPLANT Bilateral      SECTION  1987     SECTION  1987    CYSTOSCOPY W/ URETERAL STENT PLACEMENT      CYSTOSCOPY W/ URETERAL STENT PLACEMENT Left 2021    Procedure: CYSTOSCOPY KEFT RETROGRADE PYELOGRAM  LEFT  URETERAL STENT PLACEMENT;  Surgeon: Leodan Mckee Jr., MD;  Location: [facility] OR;  Service: Urology;  Laterality: Left;    CYSTOSCOPY W/ URETERAL STENT PLACEMENT Left 03/10/2022    Procedure: CYSTOSCOPY AND LEFT URETERAL STENT EXCHANGE, RETROGRADE PYELOGRAM;  Surgeon: Leodan Mckee Jr., MD;  Location: [facility] OR;  Service: Urology;  Laterality: Left;    CYSTOSCOPY W/ URETERAL STENT PLACEMENT Left 2023    Procedure: CYSTOSCOPY WITH LEFT URETERAL STENT PLACEMENT, RETROGRADE PYELOGRAM, CLOT EVACUATION, BLADDER FULGARATION;  Surgeon: Leodan Mckee Jr., MD;  Location: [facility] MAIN OR;  Service: Urology;  Laterality: Left;    D & C HYSTEROSCOPY N/A 2017    Procedure: DILATATION AND CURETTAGE, HYSTEROSCOPY ;  Surgeon: Cherie Oliveros MD;  Location: [facility] OR [facility];  Service:     D & C HYSTEROSCOPY N/A 2019    Procedure: DILATATION AND CURETTAGE HYSTEROSCOPY/POLYPECTOMY;  Surgeon: Cherie Oliveros MD;  Location: [facility];  Service: Gynecology    D & C HYSTEROSCOPY ENDOMETRIAL ABLATION N/A  5/5/2023    Procedure: DILATATION AND CURETTAGE;  Surgeon: Ina Marcos MD;  Location: [facility] OR;  Service: Obstetrics/Gynecology;  Laterality: N/A;    ENDOSCOPY      INSERTION AND REMOVAL HEMODIALYSIS CATHETER Right 05/01/2021    INSERTION HEMODIALYSIS CATHETER Right 05/01/2021    Procedure: HEMODIALYSIS CATHETER INSERTION;  Surgeon: Clint Hernández MD;  Location: [facility] OR;  Service: Vascular;  Laterality: Right;    LYMPH NODE BIOPSY      MASTECTOMY Bilateral 2004    VENOUS ACCESS DEVICE (PORT) INSERTION AND REMOVAL        General Information       Row Name 10/23/23 1541          OT Time and Intention    Document Type therapy note (daily note)  -     Mode of Treatment co-treatment;physical therapy;occupational therapy  Due to pt's decreased endurance, activity tolerance, decreased functional mobility requiring 2 skilled therapist and to maximize therapeutic benefit. OT worked on UE strengthening while PT worked on functional t/f with OT assist  -       Row Name 10/23/23 1541          General Information    Patient Profile Reviewed yes  -     Existing Precautions/Restrictions fall  -       Row Name 10/23/23 1541          Cognition    Orientation Status (Cognition) oriented x 4  -       Row Name 10/23/23 1545          Safety Issues, Functional Mobility    Impairments Affecting Function (Mobility) balance;coordination;endurance/activity tolerance;range of motion (ROM);strength;postural/trunk control  -               User Key  (r) = Recorded By, (t) = Taken By, (c) = Cosigned By      Initials Name Provider Type    Glenis Laureano OT Occupational Therapist                     Mobility/ADL's       Row Name 10/23/23 1543          Bed Mobility    Supine-Sit [place] (Bed Mobility) moderate assist (50% patient effort)  -     Comment, (Bed Mobility) UIC at the end of session  -       Row Name 10/23/23 154          Transfers    Transfers bed-chair transfer  -       Row Name  10/23/23 1545          Bed-Chair Transfer    Bed-Chair [place] (Transfers) moderate assist (50% patient effort);2 person assist  -     Assistive Device (Bed-Chair Transfers) --  HHAx2  -KA     Comment, (Bed-Chair Transfer) pt very motivated to complete bed to chair transfer today  -       Row Name 10/23/23 1545          Sit-Stand Transfer    Sit-Stand [place] (Transfers) moderate assist (50% patient effort);maximum assist (25% patient effort);2 person assist  -       Row Name 10/23/23 1545          Functional Mobility    Functional Mobility- Comment Shuffled short steps to bedside chair with mod Ax2 and HHAx2  -KA       Row Name 10/23/23 1545          Activities of Daily Living    BADL Assessment/Intervention toileting  -       Row Name 10/23/23 1545          Lower Body Dressing Assessment/Training    [place]anan Level (Lower Body Dressing) socks;don;dependent (less than 25% patient effort)  -VERÓNICA     Position (Lower Body Dressing) edge of bed sitting  -       Row Name 10/23/23 1545          Toileting Assessment/Training    [place] Level (Toileting) toileting skills;dependent (less than 25% patient effort)  -VERÓNICA               User Key  (r) = Recorded By, (t) = Taken By, (c) = Cosigned By      Initials Name Provider Type    Glenis Laureano OT Occupational Therapist                   Obj/Interventions       Row Name 10/23/23 1546          Motor Skills    Therapeutic Exercise --  Provided pt with yellow theraband to work on UE strengthening. Also worked on AROM of shoulders/elbows while seated EOB and theraband while seated in chair  -VERÓNICA               User Key  (r) = Recorded By, (t) = Taken By, (c) = Cosigned By      Initials Name Provider Type    Glenis Laureano OT Occupational Therapist                   Goals/Plan    No documentation.                  Clinical Impression       Row Name 10/23/23 1547          Plan of Care Review    Plan of Care Reviewed With patient  -KA     Outcome  Evaluation Pt seen today for session. Demo improvement with functional mobility and able to transfer to chair with assist from OT/PT. Pt was very motivated and eager to attempt chair transfer. She stood with mod-max Ax2 and transferred to chair with mod Ax2 and HHAx2. Provided pt with yellow theraband to work on UE strengthening exercises while seated in the chair. Before transferring to chair pt worked on AROM of BUE shoulders and elbows. Politely declined particpation with ADLs. Pt continues to benefit from skilled OT to address deficits.  -       Row Name 10/23/23 1547          Vital Signs    Pre Patient Position Supine  -KA     Intra Patient Position Standing  -KA     Post Patient Position Sitting  -KA       Row Name 10/23/23 1547          Positioning and Restraints    Pre-Treatment Position in bed  -KA     Post Treatment Position chair  -KA     In Chair reclined;call light within reach;encouraged to call for assist;notified nsg;with family/caregiver  -               User Key  (r) = Recorded By, (t) = Taken By, (c) = Cosigned By      Initials Name Provider Type    Glenis Laureano OT Occupational Therapist                   Outcome Measures       Row Name 10/23/23 1549          How much help from another is currently needed...    Putting on and taking off regular lower body clothing? 1  -KA     Bathing (including washing, rinsing, and drying) 2  -KA     Toileting (which includes using toilet bed pan or urinal) 1  -KA     Putting on and taking off regular upper body clothing 2  -KA     Taking care of personal grooming (such as brushing teeth) 3  -KA     Eating meals 3  -KA     AM-PAC 6 Clicks Score (OT) 12  -KA       Row Name 10/23/23 1549          Functional Assessment    Outcome Measure Options AM-PAC 6 Clicks Daily Activity (OT)  -               User Key  (r) = Recorded By, (t) = Taken By, (c) = Cosigned By      Initials Name Provider Type    Glenis Laureano OT Occupational Therapist                     Occupational Therapy Education       Title: PT OT SLP Therapies (Done)       Topic: Occupational Therapy (Done)       Point: Home exercise program (Done)       Description:   Instruct learner(s) on appropriate technique for monitoring, assisting and/or progressing therapeutic exercises/activities.                  Learning Progress Summary             Patient DEMAR Villatoro, VU by MEE at 10/15/2023 1152    Comment: ed on HEP to do in bed                                         User Key       Initials Effective Dates Name Provider Type Discipline    MEE 01/03/23 -  Kely Manjarrez, OT Occupational Therapist OT                  OT Recommendation and Plan     Plan of Care Review  Plan of Care Reviewed With: patient  Outcome Evaluation: Pt seen today for session. Demo improvement with functional mobility and able to transfer to chair with assist from OT/PT. Pt was very motivated and eager to attempt chair transfer. She stood with mod-max Ax2 and transferred to chair with mod Ax2 and HHAx2. Provided pt with yellow theraband to work on UE strengthening exercises while seated in the chair. Before transferring to chair pt worked on AROM of BUE shoulders and elbows. Politely declined particpation with ADLs. Pt continues to benefit from skilled OT to address deficits.     Time Calculation:         Time Calculation- OT       Row Name 10/23/23 1550             Time Calculation- OT    OT Start Time 1314  -KA      OT Stop Time 1343  -KA      OT Time Calculation (min) 29 min  -KA      Total Timed Code Minutes- OT 29 minute(s)  -KA      OT Received On 10/23/23  -KA      OT - Next Appointment 10/24/23  -KA         Timed Charges    25503 - OT Therapeutic Exercise Minutes 10  -KA      51102 - OT Therapeutic Activity Minutes 19  -KA         Total Minutes    Timed Charges Total Minutes 29  -KA       Total Minutes 29  -KA                User Key  (r) = Recorded By, (t) = Taken By, (c) = Cosigned By      Initials Name Provider Type     Glenis Laureano OT Occupational Therapist                  Therapy Charges for Today       Code Description Service Date Service Provider Modifiers Qty    47684031840 HC OT THER PROC EA 15 MIN 10/23/2023 Glenis Jones OT GO 1    [id number]  OT THERAPEUTIC ACT EA 15 MIN 10/23/2023 Glenis Jones OT GO 1                 Glenis Jones OT  10/23/2023

## 2023-10-23 NOTE — PROGRESS NOTES
Nutrition Services    Patient Name:  Juana Hall  YOB: 1960  MRN: [id number]  Admit Date:  10/13/2023  Assessment Date:  10/23/23    Summary:     Pt is a 63 y.o. female adm for hypotension after a large volume paracentesis during her prior adm. Her course has been complicated by an ileus versus SBO per MD note. H/o T2DM, breast cancer, stage 3 CKD, ESRD, OS, CHF. Nutrition assessment completed. Visited pt at bedside. Currently NPO. Tolerated NG tube clamped all night without nausea. Wt hx reviewed, 12 lb (3%) wt loss x 5 mo. Typically on hemodialysis 5 days a week at home.   Labs: Na 134, Glu 163, BUN 36, Creat 5.90, Cl 95, Albumin 2.0   Meds: insulin, protonix   Skin: stage 2 bilateral sacral spine PI, stage 2 left posterior thigh PI     REC:   Advance diet as tolerated and once medically appropriate per MD   Will add Jack BID B/D mixed with crystal light lemonade once diet is advanced  MD to manage hyponatremia     RD to follow per protocol.    CLINICAL NUTRITION ASSESSMENT      Reason for Assessment NPO/Clear Liquid Status     Diagnosis/Problem   Hypotension    Medical/Surgical History Past Medical History:   Diagnosis Date    Anemia     Anxiety and depression     Bicuspid aortic valve     had surgery    Breast cancer 2004    RIGHT, HAD NEELA. 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    History of sepsis 2010    KIDNEY STONE    History of transfusion     no reaction     Hyperlipidemia     Hyperthyroidism     Hypothyroidism     Kidney stone     CURRENT LEFT-DEC 2021    Lymphedema of leg     LEFT    Metastasis from breast cancer 2019    STOMACH-OMENTUM    Neuromuscular disorder     Obesity     ANDREW on CPAP     CPAP    Osteoarthrosis     Peripheral neuropathy     FEET BILAT    Radiculopathy     Rectal bleeding 2022    Thickened endometrium     Urinary incontinence     wears pads    Weakness     BILAT LEGS-WITH ANY AMT OF TIME       Past Surgical History:   Procedure Laterality Date    AORTIC VALVULOPLASTY  2023    ARTERIOVENOUS FISTULA/SHUNT SURGERY Left 2021    Procedure: LEFT  BRACHIOCEPALIC ARTERIOVENOUS FISTULA;  Surgeon: Dejuan Adler MD;  Location: [facility] MAIN OR;  Service: Vascular;  Laterality: Left;    BREAST RECONSTRUCTION  2004    WITH IMPLANTS, AND REVISION, NOW REMOVED    BREAST SURGERY Bilateral     breast implants removed and then replaced due to infection    CARDIAC CATHETERIZATION N/A 2022    Procedure: CORONARY ANGIOGRAPHY;  Surgeon: Luis Rivers MD;  [facility];  Service: Cardiology;  Laterality: N/A;    CARDIAC CATHETERIZATION N/A 2022    Procedure: Right Heart Cath;  Surgeon: Luis Rivers MD;  Location: [facility] CATH INVASIVE LOCATION;  Service: Cardiology;  Laterality: N/A;    CARDIAC CATHETERIZATION N/A 01/10/2023    Procedure: Valvuloplasty;  Surgeon: Luis Rivers MD;  [facility];  Service: Cardiovascular;  Laterality: N/A;  01/10/2023    CARDIAC CATHETERIZATION N/A 01/10/2023    Procedure: Aortic root aortogram;  Surgeon: Luis Rivers MD;  [facility];  Service: Cardiovascular;  Laterality: N/A;    CATARACT EXTRACTION WITH INTRAOCULAR LENS IMPLANT Bilateral      SECTION  1987     SECTION  1987    CYSTOSCOPY W/ URETERAL STENT PLACEMENT      CYSTOSCOPY W/  "URETERAL STENT PLACEMENT Left 12/16/2021    Procedure: CYSTOSCOPY KEFT RETROGRADE PYELOGRAM  LEFT  URETERAL STENT PLACEMENT;  Surgeon: Leodan Mckee Jr., MD;  Location: [facility] MAIN OR;  Service: Urology;  Laterality: Left;    CYSTOSCOPY W/ URETERAL STENT PLACEMENT Left 03/10/2022    Procedure: CYSTOSCOPY AND LEFT URETERAL STENT EXCHANGE, RETROGRADE PYELOGRAM;  Surgeon: Leodan Mckee Jr., MD;  [facility];  Service: Urology;  Laterality: Left;    CYSTOSCOPY W/ URETERAL STENT PLACEMENT Left 5/5/2023    Procedure: CYSTOSCOPY WITH LEFT URETERAL STENT PLACEMENT, RETROGRADE PYELOGRAM, CLOT EVACUATION, BLADDER FULGARATION;  Surgeon: Leodan Mckee Jr., MD;  Location: [facility] MAIN OR;  Service: Urology;  Laterality: Left;    D & C HYSTEROSCOPY N/A 05/22/2017    Procedure: DILATATION AND CURETTAGE, HYSTEROSCOPY ;  Surgeon: Cherie Oliveros MD;  [facility];  Service:     D & C HYSTEROSCOPY N/A 09/19/2019    Procedure: DILATATION AND CURETTAGE HYSTEROSCOPY/POLYPECTOMY;  Surgeon: Cherie Oliveros MD;  [facility];  Service: Gynecology    D & C HYSTEROSCOPY ENDOMETRIAL ABLATION N/A 5/5/2023    Procedure: DILATATION AND CURETTAGE;  Surgeon: Ina Marcos MD;  Location: [facility] MAIN OR;  Service: Obstetrics/Gynecology;  Laterality: N/A;    ENDOSCOPY      INSERTION AND REMOVAL HEMODIALYSIS CATHETER Right 05/01/2021    INSERTION HEMODIALYSIS CATHETER Right 05/01/2021    Procedure: HEMODIALYSIS CATHETER INSERTION;  Surgeon: Clint Hernández MD;  Location: [facility] MAIN OR;  Service: Vascular;  Laterality: Right;    LYMPH NODE BIOPSY      MASTECTOMY Bilateral 2004    VENOUS ACCESS DEVICE (PORT) INSERTION AND REMOVAL          Anthropometrics        Current Height  Current Weight  BMI kg/m2 Height: 170.2 cm (67\")  Weight: (!) 160 kg (353 lb 4.8 oz) (10/23/23 0322)  Body mass index is 55.33 kg/m².   Adjusted BMI (if applicable)    BMI Category Obese, Class III (40 or higher)   Ideal " Body Weight (IBW) 61.6 kg    Usual Body Weight (UBW) 360-370 lb    Weight Trend Loss, Amount/Timeframe: 12 lb (3%) wt loss x 5 mo    Weight History Wt Readings from Last 30 Encounters:   10/23/23 0322 (!) 160 kg (353 lb 4.8 oz)   10/21/23 0527 (!) 164 kg (360 lb 11.2 oz)   10/20/23 0457 (!) 167 kg (369 lb 1.6 oz)   10/19/23 0500 (!) 162 kg (356 lb 11.2 oz)   10/18/23 0510 (!) 166 kg (365 lb)   10/17/23 0537 (!) 168 kg (371 lb 1.6 oz)   10/14/23 1634 (!) 176 kg (387 lb)   10/14/23 0000 (!) 176 kg (387 lb 6.4 oz)   10/13/23 1251 (!) 154 kg (340 lb)   10/09/23 1237 (!) 160 kg (352 lb 11.8 oz)   09/30/23 0339 (!) 171 kg (376 lb 1.7 oz)   09/29/23 0323 (!) 172 kg (379 lb 3.1 oz)   09/28/23 2046 (!) 172 kg (380 lb)   09/28/23 1159 (!) 169 kg (372 lb 9.2 oz)   09/06/23 0803 (!) 169 kg (372 lb 9.2 oz)   07/19/23 1521 (!) 168 kg (370 lb)   07/12/23 1626 (!) 167 kg (368 lb 2.7 oz)   05/11/23 1342 (!) 167 kg (368 lb 2.7 oz)   05/11/23 1027 (!) 166 kg (365 lb 15.4 oz)   05/05/23 0611 (!) 168 kg (370 lb 6 oz)   05/03/23 0651 (!) 168 kg (370 lb 6 oz)   04/27/23 1349 (!) 166 kg (365 lb 15.4 oz)   04/20/23 1306 (!) 165 kg (363 lb 12.1 oz)   04/13/23 0755 (!) 165 kg (363 lb 12.1 oz)   03/21/23 1323 (!) 165 kg (363 lb 12.1 oz)   03/16/23 0837 (!) 159 kg (350 lb)   02/16/23 0959 (!) 164 kg (361 lb 8.9 oz)   02/13/23 1234 (!) 165 kg (363 lb 12.1 oz)   01/20/23 1348 (!) 164 kg (361 lb)   01/19/23 0911 (!) 164 kg (361 lb 8.9 oz)   01/10/23 1100 (!) 165 kg (363 lb 12.1 oz)   01/10/23 0821 (!) 165 kg (363 lb 12.1 oz)   01/06/23 0500 (!) 168 kg (370 lb 6 oz)   01/05/23 1331 (!) 166 kg (366 lb 10 oz)   01/04/23 0838 (!) 167 kg (368 lb 11.2 oz)   01/03/23 2245 (!) 165 kg (363 lb 12.1 oz)   12/08/22 0945 (!) 165 kg (363 lb)   11/10/22 1136 (!) 164 kg (361 lb 8 oz)   10/25/22 1301 (!) 165 kg (363 lb)   10/13/22 1132 (!) 163 kg (359 lb 5.6 oz)   09/15/22 1523 (!) 165 kg (363 lb)   08/25/22 1317 (!) 171 kg (378 lb)   08/23/22 0713 (!) 163 kg (360  lb)   08/18/22 1306 (!) 163 kg (360 lb)   08/16/22 1517 (!) 163 kg (360 lb)        Estimated Requirements         Weight used  61.6 IBW     Calories  3578-4319 (25 kcal/kg, 30 kcal/kg)    Protein  74-92 (1.2 - 1.5 gm/kg)    Fluid   (1 mL/kcal)     Labs       Pertinent Labs    Results from last 7 days   Lab Units 10/23/23  0405 10/22/23  0401 10/21/23  0353   SODIUM mmol/L 134* 135* 139   POTASSIUM mmol/L 4.0 4.3 4.0   CHLORIDE mmol/L 95* 96* 99   CO2 mmol/L 27.0 23.0 25.4   BUN mg/dL 36* 26* 33*   CREATININE mg/dL 5.90* 4.67* 5.73*   CALCIUM mg/dL 8.8 8.7 8.2*   BILIRUBIN mg/dL  --  0.5  --    ALK PHOS U/L  --  101  --    ALT (SGPT) U/L  --  12  --    AST (SGOT) U/L  --  18  --    GLUCOSE mg/dL 150* 139* 153*     Results from last 7 days   Lab Units 10/23/23  0406 10/22/23  0401   HEMOGLOBIN g/dL 8.9* 9.8*   HEMATOCRIT % 26.8* 30.7*   WBC 10*3/mm3 8.48 8.53   ALBUMIN g/dL  --  2.0*     Results from last 7 days   Lab Units 10/23/23  0406 10/22/23  0401 10/21/23  0353 10/20/23  0534 10/19/23  0413   PLATELETS 10*3/mm3 192 185 163 161 157     COVID19   Date Value Ref Range Status   10/13/2023 Not Detected Not Detected - Ref. Range Final     Lab Results   Component Value Date    HGBA1C 6.00 (H) 10/14/2023          Medications           Scheduled Medications Abemaciclib, 100 mg, Oral, BID  albumin human, 12.5 g, Intravenous, Once  atorvastatin, 40 mg, Oral, Nightly  diatrizoate meglumine-sodium, 30 mL, Oral, Once  epoetin jana/jana-epbx, 20,000 Units, Subcutaneous, Once per day on Mon Wed Fri  folic acid, 1 mg, Oral, Daily  gabapentin, 300 mg, Oral, Nightly  insulin lispro, 2-9 Units, Subcutaneous, Q4H  levothyroxine, 50 mcg, Oral, Q AM  midodrine, 5 mg, Oral, TID AC  multivitamin, 1 tablet, Oral, Daily  mupirocin, 1 application , Topical, Daily  pantoprazole, 40 mg, Intravenous, Q AM  polyethylene glycol, 17 g, Oral, Daily  potassium chloride, 20 mEq, Oral, Daily  senna-docusate sodium, 2 tablet, Oral, BID  sertraline,  150 mg, Oral, Daily  sodium chloride, 10 mL, Intravenous, Q12H  vitamin B-12, 1,000 mcg, Oral, Daily       Infusions     PRN Medications   acetaminophen **OR** acetaminophen **OR** acetaminophen    acetaminophen **OR** acetaminophen    acetaminophen    albumin human    senna-docusate sodium **AND** polyethylene glycol **AND** bisacodyl **AND** bisacodyl    dextrose    dextrose    diphenoxylate-atropine    glucagon (human recombinant)    HYDROcodone-acetaminophen    influenza vaccine    Menthol-Zinc Oxide    metoclopramide    nitroglycerin    ondansetron **OR** ondansetron    sodium chloride    [COMPLETED] Insert Peripheral IV **AND** sodium chloride    sodium chloride    sodium chloride    sodium chloride     Physical Findings          General Findings alert, obese, oriented   Oral/Mouth Cavity WNL   Edema  2+ (mild)   Gastrointestinal hypoactive bowel sounds, normoactive, passing flatus, last bowel movement: 10/22   Skin  bruising, pale, pressure injury: bilateral sacral spine stage 2, left posterior thigh stage 2    Tubes/Drains/Lines dialysis catheter, NG tube    NFPE Not indicated at this time   --  Current Nutrition Orders & Evaluation of Intake       Oral Nutrition     Food Allergies NKFA   Current PO Diet NPO Diet NPO Type: Sips with Meds, Ice Chips, Other (see comments)   Supplement n/a   PO Evaluation     % PO Intake NPO    Factors Affecting Intake: decreased appetite   --  PES STATEMENT / NUTRITION DIAGNOSIS      Nutrition Dx Problem  Problem: Increased Nutrient Needs  Etiology: Medical Diagnosis - stage 2 bilateral sacral spine PI, stage 2 left posterior thigh PI     Signs/Symptoms: Protein     NUTRITION INTERVENTION / PLAN OF CARE      Intervention Goal(s) Maintain nutrition status, Improved nutrition related labs, Establish goals of care, Reduce/improve symptoms, Disease management/therapy, Initiate feeding/diet, Establish PO intake, Tolerate PO , Advance diet, Appropriate weight loss, and PO intake  goal %: 75%         RD Intervention/Action Await initiation/advancement of PO diet, Continue to monitor, and Care plan reviewed   --      Prescription/Orders:       PO Diet AD[facility]       Supplements Jack BID B/D mixed with crystal light lemonade       Enteral Nutrition       Parenteral Nutrition    New Prescription Ordered? No, recommended   --      Monitor/Evaluation Per protocol   Discharge Plan/Needs Pending clinical course   --    RD to follow per protocol.      Electronically signed by:  Audrey Sanchez Dietitian Intern   10/23/23 11:18 EDT

## 2023-10-23 NOTE — PROGRESS NOTES
Name: Juana Hall ADMIT: 10/13/2023   : 1960  PCP: Kiana Noriega (BETY Sanders    MRN: [id number] LOS: 9 days   AGE/SEX: 63 y.o. female  ROOM: [facility]     Subjective   Subjective     No events overnight.  Her NG was clamped last night, and she appears to be tolerating this without any nausea or vomiting.       Objective   Objective   Vital Signs  Temp:  [97.3 °F (36.3 °C)-98.7 °F (37.1 °C)] 97.3 °F (36.3 °C)  Heart Rate:  [87-98] 98  Resp:  [18] 18  BP: ()/(40-74) 129/74  SpO2:  [100 %] 100 %  on   ;   Device (Oxygen Therapy): room air  Body mass index is 55.33 kg/m².  Physical Exam  Constitutional:       General: She is not in acute distress.     Appearance: She is obese. She is ill-appearing.   Cardiovascular:      Rate and Rhythm: Normal rate and regular rhythm.      Heart sounds: Murmur heard.   Pulmonary:      Effort: Pulmonary effort is normal.      Breath sounds: Normal breath sounds.   Abdominal:      General: There is no distension.      Palpations: Abdomen is soft.      Tenderness: There is no abdominal tenderness. There is no guarding or rebound.   Neurological:      Mental Status: She is alert.   Psychiatric:         Mood and Affect: Mood normal.         Behavior: Behavior normal.         Results Review     I reviewed the patient's new clinical results.  Results from last 7 days   Lab Units 10/23/23  0406 10/22/23  0401 10/21/23  0353 10/20/23  0534   WBC 10*3/mm3 8.48 8.53 8.18 7.93   HEMOGLOBIN g/dL 8.9* 9.8* 8.9* 8.4*   PLATELETS 10*3/mm3 192 185 163 161     Results from last 7 days   Lab Units 10/23/23  0405 10/22/23  0401 10/21/23  0353 10/20/23  0534   SODIUM mmol/L 134* 135* 139 140   POTASSIUM mmol/L 4.0 4.3 4.0 3.5   CHLORIDE mmol/L 95* 96* 99 97*   CO2 mmol/L 27.0 23.0 25.4 25.0   BUN mg/dL 36* 26* 33* 40*   CREATININE mg/dL 5.90* 4.67* 5.73* 6.60*   GLUCOSE mg/dL 150* 139* 153* 164*   Estimated Creatinine Clearance: 15.2 mL/min (A) (by C-G formula based on SCr of 5.9  mg/dL (H)).  Results from last 7 days   Lab Units 10/22/23  0401   ALBUMIN g/dL 2.0*   BILIRUBIN mg/dL 0.5   ALK PHOS U/L 101   AST (SGOT) U/L 18   ALT (SGPT) U/L 12     Results from last 7 days   Lab Units 10/23/23  0405 10/22/23  0401 10/21/23  0353 10/20/23  0534   CALCIUM mg/dL 8.8 8.7 8.2* 8.5*   ALBUMIN g/dL  --  2.0*  --   --        COVID19   Date Value Ref Range Status   10/13/2023 Not Detected Not Detected - Ref. Range Final   09/26/2023 Not Detected Not Detected - Ref. Range Final   01/04/2023 Not Detected Not Detected - Ref. Range Final   08/20/2022 Not Detected Not Detected - Ref. Range Final   03/08/2022 Not Detected Not Detected - Ref. Range Final     Glucose   Date/Time Value Ref Range Status   10/23/2023 1129 159 (H) 70 - 130 mg/dL Final   10/23/2023 0751 163 (H) 70 - 130 mg/dL Final   10/23/2023 0015 146 (H) 70 - 130 mg/dL Final   10/22/2023 2034 165 (H) 70 - 130 mg/dL Final   10/22/2023 1649 168 (H) 70 - 130 mg/dL Final   10/22/2023 1127 156 (H) 70 - 130 mg/dL Final   10/22/2023 0756 152 (H) 70 - 130 mg/dL Final       CT Abdomen Pelvis With Contrast  CT ABDOMEN AND PELVIS WITH IV CONTRAST     HISTORY: 63-year-old female with metastatic breast cancer. Abdominal  pain. Ultrasound-guided paracentesis removed 11 L of fluid on  10/09/2023.     TECHNIQUE: Radiation dose reduction techniques were utilized, including  automated exposure control and exposure modulation based on body size.   3 mm images were obtained through the abdomen and pelvis after the  administration of IV contrast. Compared with previous CT 09/28/2023.     FINDINGS:  1. There has been reaccumulation of a moderately large volume of ascites  throughout the abdomen and pelvis, slightly smaller in volume than  previously.     2. There is a significantly thickened appearance of the gastric antrum  which is contracted. Appearance is suggestive of antritis or possibly  metastatic disease to the gastric antrum. Duodenum is collapsed  and  there is mild dilatation of a segment of jejunum, but there is no small  or large bowel obstruction. The colon is nearly completely collapsed.     3. No acute abnormality is seen at the liver, gallbladder, spleen,  pancreas, adrenals, or atrophied kidneys. Left ureteral stent remains in  place and there is no hydronephrosis. No change in the appearance of the  uterus and adnexa.     4. There is a new very small left pleural effusion and new left lower  lobe atelectasis. There is no significant change in the appearance of  the T11 sclerotic metastasis.     This report was finalized on 10/20/2023 7:46 AM by Dr. Lety Alicea M.D  on Workstation: [id number]       Scheduled Medications  Abemaciclib, 100 mg, Oral, BID  albumin human, 12.5 g, Intravenous, Once  atorvastatin, 40 mg, Oral, Nightly  diatrizoate meglumine-sodium, 30 mL, Oral, Once  epoetin jana/jana-epbx, 20,000 Units, Subcutaneous, Once per day on Mon Wed Fri  folic acid, 1 mg, Oral, Daily  gabapentin, 300 mg, Oral, Nightly  insulin lispro, 2-9 Units, Subcutaneous, Q4H  levothyroxine, 50 mcg, Oral, Q AM  midodrine, 5 mg, Oral, TID AC  multivitamin, 1 tablet, Oral, Daily  mupirocin, 1 application , Topical, Daily  pantoprazole, 40 mg, Intravenous, Q AM  polyethylene glycol, 17 g, Oral, Daily  potassium chloride, 20 mEq, Oral, Daily  senna-docusate sodium, 2 tablet, Oral, BID  sertraline, 150 mg, Oral, Daily  sodium chloride, 10 mL, Intravenous, Q12H  vitamin B-12, 1,000 mcg, Oral, Daily    Infusions   Diet  NPO Diet NPO Type: Sips with Meds, Ice Chips, Other (see comments)       Assessment/Plan     Active Hospital Problems    Diagnosis  POA    **Hypotension [I95.9]  Yes    Abnormal urinalysis [R82.90]  Unknown    Hypokalemia [E87.6]  Unknown    Diastolic CHF, chronic [I50.32]  Yes    ANDREW (obstructive sleep apnea) [G47.33]  Yes    Combined systolic and diastolic congestive heart failure [I50.40]  Yes    ESRD (end stage renal disease) [N18.6]  Yes     Morbid obesity with BMI of 50.0-59.9, adult [E66.01, Z68.43]  Not Applicable    Anemia in stage 3 chronic kidney disease [N18.30, D63.1]  Yes    Primary malignant neoplasm of breast with metastasis [C50.919]  Yes    Acquired hypothyroidism [E03.9]  Yes    Diabetes type 2, controlled [E11.9]  Yes      Resolved Hospital Problems   No resolved problems to display.       63 y.o. female admitted with Hypotension.    63 year old woman who presented with hypotension after a large volume paracentesis during her prior admission.  She was found to have pneumonia and a UTI.  Shortly after admission, she was transferred to the ICU due to refractory hypotension.  Her course has been complicated by an ileus versus small bowel obstruction.    Hypotension-multifactorial due to infection, large-volume paracentesis, HD, aortic valve stenosis.  On midodrine.  Improving  Pneumonia/UTI-completed course of antibiotics while in the ICU.  Ileus versus small bowel obstruction-she currently has her NG tube clamped, and she is tolerating this without nausea or vomiting.  She is having bowel function.  Defer diet to general surgery.  ESRD-on home hemodialysis 5 days a week at home.  Nephrology managing here.  Type 2 diabetes-glucose at goal  Aortic valve stenosis-not a candidate for intervention  Chronic diastolic heart failure-volume managed with dialysis  Paroxysmal A-fib-blood pressure too low to tolerate beta-blockade.  Not on anticoagulation due to anemia.  Anemia of CKD-on EPO injections  Hypothyroidism-Synthroid  Stage IV breast cancer with malignant ascites-on abemaciclib and faslodex as an outpatient  Thickened appearance of gastric antrum on CT-on IV PPI, though this could also be evidence of metastatic disease and general surgery is considering an EGD to evaluate  ANDREW-PAP if available  SCDs for DVT prophylaxis.  Full code.  Discussed with patient.  Anticipate discharge  TBD  timing yet to be determined.      Nicolás Rose,  MD [facility]  10/23/23  12:16 EDT    I wore protective equipment throughout this patient encounter including a face mask, gloves and protective eyewear.  Hand hygiene was performed before donning protective equipment and after removal when leaving the room.

## 2023-10-23 NOTE — PROGRESS NOTES
"General Surgery Progress Note       S: doing well. NGT clamped all night without nausea or vomiting. She has been eating ice chips and popsicles. Having bowel function.     O:/74 (BP Location: Right arm, Patient Position: Lying)   Pulse 98   Temp 97.3 °F (36.3 °C) (Oral)   Resp 18   Ht 170.2 cm (67\")   Wt (!) 160 kg (353 lb 4.8 oz)   LMP  (LMP Unknown)   SpO2 100%   BMI 55.33 kg/m²     Intake & Output (last day)         10/22 0701  10/23 0700 10/23 0701  10/24 0700    P.O. 240     Total Intake(mL/kg) 240 (1.5)     Emesis/NG output 425     Stool 0     Dialysis      Total Output 425     Net -185           Urine Unmeasured Occurrence  0 x    Stool Unmeasured Occurrence 1 x             GENERAL: awake, alert, no apparent distress  HEENT: normochephalic, atraumatic, moist mucus membranes, clear sclera   CHEST: clear to auscultation, no wheezes, no increased work of breathing  CARDIAC: regular rate and rhythm    ABDOMEN: soft, nondistended, nontender  EXTREMITIES: no cyanosis, clubbing or edema    SKIN: Warm and moist, no rashes    LABS  Results from last 7 days   Lab Units 10/23/23  0406 10/22/23  0401 10/21/23  0353   WBC 10*3/mm3 8.48 8.53 8.18   HEMOGLOBIN g/dL 8.9* 9.8* 8.9*   HEMATOCRIT % 26.8* 30.7* 28.0*   PLATELETS 10*3/mm3 192 185 163   MONOCYTES % %  --   --  6.0   EOSINOPHIL % %  --   --  1.0     Results from last 7 days   Lab Units 10/23/23  0405 10/22/23  0401 10/21/23  0353   SODIUM mmol/L 134* 135* 139   POTASSIUM mmol/L 4.0 4.3 4.0   CHLORIDE mmol/L 95* 96* 99   CO2 mmol/L 27.0 23.0 25.4   BUN mg/dL 36* 26* 33*   CREATININE mg/dL 5.90* 4.67* 5.73*   CALCIUM mg/dL 8.8 8.7 8.2*   BILIRUBIN mg/dL  --  0.5  --    ALK PHOS U/L  --  101  --    ALT (SGPT) U/L  --  12  --    AST (SGOT) U/L  --  18  --    GLUCOSE mg/dL 150* 139* 153*             A/P: 63 y.o. female with peritoneal carcinomatosis with bowel obstruction that has resolved.     NGT removed.  Start CLD. I discussed advancing slowly and not " advancing past soft diet. She was agreeable to this plan.        CLARI FRANKLIN MD  General, Robotic and Endoscopic Surgery  [facility]    [address], [address]  [place], [place], [address]  P: [phone]  F: [phone]

## 2023-10-24 LAB
ANION GAP SERPL CALCULATED.3IONS-SCNC: 11.8 MMOL/L (ref 5–15)
BASOPHILS # BLD AUTO: 0.03 10*3/MM3 (ref 0–0.2)
BASOPHILS NFR BLD AUTO: 0.4 % (ref 0–1.5)
BUN SERPL-MCNC: 42 MG/DL (ref 8–23)
BUN/CREAT SERPL: 6.2 (ref 7–25)
CALCIUM SPEC-SCNC: 8 MG/DL (ref 8.6–10.5)
CHLORIDE SERPL-SCNC: 94 MMOL/L (ref 98–107)
CO2 SERPL-SCNC: 26.2 MMOL/L (ref 22–29)
CREAT SERPL-MCNC: 6.73 MG/DL (ref 0.57–1)
DEPRECATED RDW RBC AUTO: 53.1 FL (ref 37–54)
EGFRCR SERPLBLD CKD-EPI 2021: 6.4 ML/MIN/1.73
EOSINOPHIL # BLD AUTO: 0.14 10*3/MM3 (ref 0–0.4)
EOSINOPHIL NFR BLD AUTO: 1.7 % (ref 0.3–6.2)
ERYTHROCYTE [DISTWIDTH] IN BLOOD BY AUTOMATED COUNT: 15.6 % (ref 12.3–15.4)
GLUCOSE BLDC GLUCOMTR-MCNC: 178 MG/DL (ref 70–130)
GLUCOSE BLDC GLUCOMTR-MCNC: 179 MG/DL (ref 70–130)
GLUCOSE BLDC GLUCOMTR-MCNC: 239 MG/DL (ref 70–130)
GLUCOSE SERPL-MCNC: 179 MG/DL (ref 65–99)
HCT VFR BLD AUTO: 24.8 % (ref 34–46.6)
HGB BLD-MCNC: 8 G/DL (ref 12–15.9)
IMM GRANULOCYTES # BLD AUTO: 0.35 10*3/MM3 (ref 0–0.05)
IMM GRANULOCYTES NFR BLD AUTO: 4.3 % (ref 0–0.5)
LYMPHOCYTES # BLD AUTO: 0.74 10*3/MM3 (ref 0.7–3.1)
LYMPHOCYTES NFR BLD AUTO: 9.1 % (ref 19.6–45.3)
MCH RBC QN AUTO: 30.7 PG (ref 26.6–33)
MCHC RBC AUTO-ENTMCNC: 32.3 G/DL (ref 31.5–35.7)
MCV RBC AUTO: 95 FL (ref 79–97)
MONOCYTES # BLD AUTO: 0.75 10*3/MM3 (ref 0.1–0.9)
MONOCYTES NFR BLD AUTO: 9.2 % (ref 5–12)
NEUTROPHILS NFR BLD AUTO: 6.1 10*3/MM3 (ref 1.7–7)
NEUTROPHILS NFR BLD AUTO: 75.3 % (ref 42.7–76)
NRBC BLD AUTO-RTO: 0.1 /100 WBC (ref 0–0.2)
PLATELET # BLD AUTO: 189 10*3/MM3 (ref 140–450)
PMV BLD AUTO: 10.4 FL (ref 6–12)
POTASSIUM SERPL-SCNC: 4.3 MMOL/L (ref 3.5–5.2)
RBC # BLD AUTO: 2.61 10*6/MM3 (ref 3.77–5.28)
SODIUM SERPL-SCNC: 132 MMOL/L (ref 136–145)
WBC NRBC COR # BLD: 8.11 10*3/MM3 (ref 3.4–10.8)

## 2023-10-24 PROCEDURE — 80048 BASIC METABOLIC PNL TOTAL CA: CPT | Performed by: INTERNAL MEDICINE

## 2023-10-24 PROCEDURE — P9047 ALBUMIN (HUMAN), 25%, 50ML: HCPCS | Performed by: INTERNAL MEDICINE

## 2023-10-24 PROCEDURE — 25010000002 ALBUMIN HUMAN 25% PER 50 ML: Performed by: INTERNAL MEDICINE

## 2023-10-24 PROCEDURE — 85025 COMPLETE CBC W/AUTO DIFF WBC: CPT | Performed by: HOSPITALIST

## 2023-10-24 PROCEDURE — 63710000001 INSULIN LISPRO (HUMAN) PER 5 UNITS: Performed by: STUDENT IN AN ORGANIZED HEALTH CARE EDUCATION/TRAINING PROGRAM

## 2023-10-24 PROCEDURE — 82948 REAGENT STRIP/BLOOD GLUCOSE: CPT

## 2023-10-24 PROCEDURE — 99232 SBSQ HOSP IP/OBS MODERATE 35: CPT | Performed by: INTERNAL MEDICINE

## 2023-10-24 PROCEDURE — 99232 SBSQ HOSP IP/OBS MODERATE 35: CPT | Performed by: SURGERY

## 2023-10-24 RX ADMIN — ATORVASTATIN CALCIUM 40 MG: 20 TABLET, FILM COATED ORAL at 23:14

## 2023-10-24 RX ADMIN — ALBUMIN (HUMAN) 12.5 G: 12.5 SOLUTION INTRAVENOUS at 09:19

## 2023-10-24 RX ADMIN — Medication 10 ML: at 23:15

## 2023-10-24 RX ADMIN — FOLIC ACID 1 MG: 1 TABLET ORAL at 08:27

## 2023-10-24 RX ADMIN — INSULIN LISPRO 2 UNITS: 100 INJECTION, SOLUTION INTRAVENOUS; SUBCUTANEOUS at 08:23

## 2023-10-24 RX ADMIN — INSULIN LISPRO 4 UNITS: 100 INJECTION, SOLUTION INTRAVENOUS; SUBCUTANEOUS at 23:14

## 2023-10-24 RX ADMIN — GABAPENTIN 300 MG: 300 CAPSULE ORAL at 23:14

## 2023-10-24 RX ADMIN — ALBUMIN (HUMAN) 12.5 G: 12.5 SOLUTION INTRAVENOUS at 10:20

## 2023-10-24 RX ADMIN — Medication 10 ML: at 08:21

## 2023-10-24 RX ADMIN — PANTOPRAZOLE SODIUM 40 MG: 40 INJECTION, POWDER, FOR SOLUTION INTRAVENOUS at 06:06

## 2023-10-24 RX ADMIN — INSULIN LISPRO 2 UNITS: 100 INJECTION, SOLUTION INTRAVENOUS; SUBCUTANEOUS at 17:54

## 2023-10-24 RX ADMIN — MIDODRINE HYDROCHLORIDE 5 MG: 5 TABLET ORAL at 06:06

## 2023-10-24 RX ADMIN — LEVOTHYROXINE SODIUM 50 MCG: 50 TABLET ORAL at 06:06

## 2023-10-24 RX ADMIN — Medication 1000 MCG: at 08:26

## 2023-10-24 RX ADMIN — SERTRALINE 150 MG: 100 TABLET, FILM COATED ORAL at 08:25

## 2023-10-24 RX ADMIN — POTASSIUM CHLORIDE 10 MEQ: 750 TABLET, EXTENDED RELEASE ORAL at 08:27

## 2023-10-24 RX ADMIN — MIDODRINE HYDROCHLORIDE 5 MG: 5 TABLET ORAL at 17:55

## 2023-10-24 RX ADMIN — Medication 1 TABLET: at 08:27

## 2023-10-24 NOTE — PLAN OF CARE
Goal Outcome Evaluation:  Plan of Care Reviewed With: patient        Progress: improving  Outcome Evaluation: Calm and cooperative with care. A/Ox4. RA. NG tube removed today. Diet changed from NPO to clr liquids. No c/o nausea this shift. BP soft. Midodrine continues TID. Spouse at bedside for a few hours today. Up to chair with assist x2. Safety measures in place.

## 2023-10-24 NOTE — PROGRESS NOTES
Name: Juana Hall ADMIT: 10/13/2023   : 1960  PCP: Kiana Noriega ([place])BETY    MRN: [id number] LOS: 10 days   AGE/SEX: 63 y.o. female  ROOM: [facility]     Subjective   Subjective   Seen on hemodialysis.  Was asleep when I entered.  She denies any acute issues overnight.  Denies any chest pain, dyspnea, cough, fever, chills.  Denies any nausea or vomiting and feels she is tolerating clear liquids well.  States she is passing gas and having bowel movements.     Objective   Objective   Vital Signs  Temp:  [96.2 °F (35.7 °C)-97.7 °F (36.5 °C)] 96.2 °F (35.7 °C)  Heart Rate:  [70-87] 75  Resp:  [16-18] 16  BP: (85-99)/(45-59) 98/52  SpO2:  [90 %-97 %] 97 %  on   ;   Device (Oxygen Therapy): room air  Body mass index is 56.31 kg/m².    Physical Exam  Vitals and nursing note reviewed.   Constitutional:       Appearance: She is obese. She is ill-appearing (chronically). She is not toxic-appearing.   Cardiovascular:      Rate and Rhythm: Normal rate.      Pulses: Normal pulses.      Heart sounds: Murmur heard.   Pulmonary:      Effort: Pulmonary effort is normal. No respiratory distress.      Comments: Diminished d/t body habitus  Abdominal:      General: Bowel sounds are normal.      Palpations: Abdomen is soft.      Tenderness: There is no abdominal tenderness.   Musculoskeletal:         General: Swelling present. Normal range of motion.   Skin:     General: Skin is warm and dry.   Neurological:      General: No focal deficit present.      Mental Status: She is alert and oriented to person, place, and time.      Motor: Weakness present.   Psychiatric:         Mood and Affect: Mood normal.         Behavior: Behavior normal.       Results Review:       I reviewed the patient's new clinical results.  Results from last 7 days   Lab Units 10/24/23  0412 10/23/23  0406 10/22/23  0401 10/21/23  0353   WBC 10*3/mm3 8.11 8.48 8.53 8.18   HEMOGLOBIN g/dL 8.0* 8.9* 9.8* 8.9*   PLATELETS 10*3/mm3 189 192 185 163      Results from last 7 days   Lab Units 10/24/23  0412 10/23/23  0405 10/22/23  0401 10/21/23  0353   SODIUM mmol/L 132* 134* 135* 139   POTASSIUM mmol/L 4.3 4.0 4.3 4.0   CHLORIDE mmol/L 94* 95* 96* 99   CO2 mmol/L 26.2 27.0 23.0 25.4   BUN mg/dL 42* 36* 26* 33*   CREATININE mg/dL 6.73* 5.90* 4.67* 5.73*   GLUCOSE mg/dL 179* 150* 139* 153*   Estimated Creatinine Clearance: 13.4 mL/min (A) (by C-G formula based on SCr of 6.73 mg/dL (H)).  Results from last 7 days   Lab Units 10/22/23  0401   ALBUMIN g/dL 2.0*   BILIRUBIN mg/dL 0.5   ALK PHOS U/L 101   AST (SGOT) U/L 18   ALT (SGPT) U/L 12     Results from last 7 days   Lab Units 10/24/23  0412 10/23/23  0405 10/22/23  0401 10/21/23  0353   CALCIUM mg/dL 8.0* 8.8 8.7 8.2*   ALBUMIN g/dL  --   --  2.0*  --        Glucose   Date/Time Value Ref Range Status   10/24/2023 0800 178 (H) 70 - 130 mg/dL Final   10/23/2023 2051 212 (H) 70 - 130 mg/dL Final   10/23/2023 1634 206 (H) 70 - 130 mg/dL Final   10/23/2023 1129 159 (H) 70 - 130 mg/dL Final   10/23/2023 0751 163 (H) 70 - 130 mg/dL Final   10/23/2023 0015 146 (H) 70 - 130 mg/dL Final   10/22/2023 2034 165 (H) 70 - 130 mg/dL Final       Abemaciclib, 100 mg, Oral, BID  albumin human, 12.5 g, Intravenous, Once  atorvastatin, 40 mg, Oral, Nightly  diatrizoate meglumine-sodium, 30 mL, Oral, Once  epoetin jana/jana-epbx, 20,000 Units, Subcutaneous, Once per day on Mon Wed Fri  folic acid, 1 mg, Oral, Daily  gabapentin, 300 mg, Oral, Nightly  insulin lispro, 2-9 Units, Subcutaneous, 4x Daily With Meals & Nightly  levothyroxine, 50 mcg, Oral, Q AM  midodrine, 5 mg, Oral, TID AC  multivitamin, 1 tablet, Oral, Daily  mupirocin, 1 application , Topical, Daily  pantoprazole, 40 mg, Intravenous, Q AM  polyethylene glycol, 17 g, Oral, Daily  senna-docusate sodium, 2 tablet, Oral, BID  sertraline, 150 mg, Oral, Daily  sodium chloride, 10 mL, Intravenous, Q12H  vitamin B-12, 1,000 mcg, Oral, Daily       Diet: Liquid Diets, Diabetic  Diets; Clear Liquid; Consistent Carbohydrate; Fluid Consistency: Thin (IDDSI 0)       Assessment/Plan     Active Hospital Problems    Diagnosis  POA   • **Hypotension [I95.9]  Yes   • Abnormal urinalysis [R82.90]  Unknown   • Hypokalemia [E87.6]  Unknown   • Diastolic CHF, chronic [I50.32]  Yes   • ANDREW (obstructive sleep apnea) [G47.33]  Yes   • Combined systolic and diastolic congestive heart failure [I50.40]  Yes   • ESRD (end stage renal disease) [N18.6]  Yes   • Morbid obesity with BMI of 50.0-59.9, adult [E66.01, Z68.43]  Not Applicable   • Anemia in stage 3 chronic kidney disease [N18.30, D63.1]  Yes   • Primary malignant neoplasm of breast with metastasis [C50.919]  Yes   • Acquired hypothyroidism [E03.9]  Yes   • Diabetes type 2, controlled [E11.9]  Yes      Resolved Hospital Problems   No resolved problems to display.     Ms. Hall is a 63 y.o. that initially presented to the hospital on 10/13/2023 for weakness, dizziness and multiple falls.  She had a brief recent hospitalization at the end of September where she presented with nausea and vomiting found to have a large amount of ascites.  She ultimately underwent ultrasound-guided paracentesis on 10/9 with 11 L of fluid removed precipitated her symptoms.  She was advised by Dr. Irvin her oncologist to go to the emergency room.  She typically does home hemodialysis at baseline was noted to be hypotensive on arrival.  She did require transfer to the ICU for vasopressors. She was found to have pneumonia and a UTI as well. Her course has been complicated by an ileus versus small bowel obstruction.     Hypotension-multifactorial due to infection, large-volume paracentesis, HD, aortic valve stenosis.  On midodrine.  Improving.  Pneumonia/UTI-completed course of antibiotics while in the ICU.  Ileus versus small bowel obstruction-NG removed 10/23. Tolerating CLD with return of bowel function. Defer diet to general surgery.  ESRD-on home hemodialysis 5 days a  week at home.  Nephrology managing here on T/Th/Sat schedule.  Type 2 diabetes-glucose at goal on SSI  Aortic valve stenosis-not a candidate for intervention  Chronic diastolic heart failure-volume managed with dialysis  Paroxysmal A-fib-blood pressure too low to tolerate beta-blockade.  Not on anticoagulation due to anemia.  Anemia of CKD-on EPO injections  Hypothyroidism-Synthroid  Stage IV breast cancer with malignant ascites-on abemaciclib and faslodex as an outpatient  Thickened appearance of gastric antrum on CT-on IV PPI. May eventually need outpatient EGD.  ANDREW-PAP if available    Discussed with patient.    Palliative consulted this admission and patient not interested.    VTE Prophylaxis - SCDs  Code Status - Full code  Disposition - Anticipate discharge to SNF- likely in next 1-2 days. Possibly tomorrow?      BETY Irving  [facility]  10/24/23  12:06 EDT

## 2023-10-24 NOTE — PROGRESS NOTES
Subjective     CHIEF COMPLAINT:     Metastatic breast cancer on Faslodex and abemaciclib  Severe aortic stenosis    INTERVAL HISTORY:     Patient reports feeling better today. No nausea or vomiting. She felt that her abdomen was softer today.     REVIEW OF SYSTEMS:  A comprehensive review of systems was obtained with pertinent positive findings as noted in the interval history above.  All other systems negative.    SCHEDULED MEDS:  Abemaciclib, 100 mg, Oral, BID  albumin human, 12.5 g, Intravenous, Once  atorvastatin, 40 mg, Oral, Nightly  diatrizoate meglumine-sodium, 30 mL, Oral, Once  epoetin jana/jana-epbx, 20,000 Units, Subcutaneous, Once per day on Mon Wed Fri  folic acid, 1 mg, Oral, Daily  gabapentin, 300 mg, Oral, Nightly  insulin lispro, 2-9 Units, Subcutaneous, 4x Daily With Meals & Nightly  levothyroxine, 50 mcg, Oral, Q AM  midodrine, 5 mg, Oral, TID AC  multivitamin, 1 tablet, Oral, Daily  mupirocin, 1 application , Topical, Daily  pantoprazole, 40 mg, Intravenous, Q AM  polyethylene glycol, 17 g, Oral, Daily  senna-docusate sodium, 2 tablet, Oral, BID  sertraline, 150 mg, Oral, Daily  sodium chloride, 10 mL, Intravenous, Q12H  vitamin B-12, 1,000 mcg, Oral, Daily      Objective   VITAL SIGNS:  Temp:  [96.2 °F (35.7 °C)-97.7 °F (36.5 °C)] 96.6 °F (35.9 °C)  Heart Rate:  [70-80] 74  Resp:  [16-18] 16  BP: (86-99)/(40-59) 97/40     PHYSICAL EXAMINATION:  GENERAL:  The patient appears in fair general condition, not in acute distress.  SKIN: No skin rash.   EYES:  No Jaundice. Pallor.   CHEST: Normal respiratory effort. Lungs clear to auscultation.    CARDIAC:  Normal S1 & S2. No murmur.   ABDOMEN: Improvement in the abdominal distension. No tenderness.    RESULT REVIEW:   Results from last 7 days   Lab Units 10/24/23  0412 10/23/23  0406 10/22/23  0401 10/21/23  0353 10/20/23  0534   WBC 10*3/mm3 8.11 8.48 8.53 8.18 7.93   NEUTROS ABS 10*3/mm3 6.10 6.39 6.17 6.87 5.96   LYMPHS ABS 10*3/mm3  --   --   --   0.74  --    HEMOGLOBIN g/dL 8.0* 8.9* 9.8* 8.9* 8.4*   HEMATOCRIT % 24.8* 26.8* 30.7* 28.0* 26.5*   PLATELETS 10*3/mm3 189 192 185 163 161     Results from last 7 days   Lab Units 10/24/23  0412 10/23/23  0405 10/22/23  0401 10/21/23  0353 10/20/23  0534   SODIUM mmol/L 132* 134* 135* 139 140   POTASSIUM mmol/L 4.3 4.0 4.3 4.0 3.5   CHLORIDE mmol/L 94* 95* 96* 99 97*   CO2 mmol/L 26.2 27.0 23.0 25.4 25.0   BUN mg/dL 42* 36* 26* 33* 40*   CREATININE mg/dL 6.73* 5.90* 4.67* 5.73* 6.60*   CALCIUM mg/dL 8.0* 8.8 8.7 8.2* 8.5*   ALBUMIN g/dL  --   --  2.0*  --   --    BILIRUBIN mg/dL  --   --  0.5  --   --    ALK PHOS U/L  --   --  101  --   --    ALT (SGPT) U/L  --   --  12  --   --    AST (SGOT) U/L  --   --  18  --   --      Component      Latest Ref Rn 3/16/2023 10/16/2023   CA 15-3      <=25.0 U/mL 27.0 (H)  21.3        Assessment & Plan   *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  Patient follows with Dr. Irvin at our office.  PET scan on 7/3/2023 revealed metastatic disease to bone.  There was hypermetabolic ascites with soft tissue density in the anterior pelvis-possible peritoneal carcinomatosis.  Soft tissue thickening at the left renal pelvis.  Paracentesis on 10/9/2023-rare atypical cells suspicious for lobular carcinoma.  Were reported as ER/MD negative.  Patient is on combination regimen with Faslodex monthly and abemaciclib 100 mg twice daily.  She was going to have follow-up PET scan on 10/23/2023-on hold due to admission.    *Ileus versus small bowel obstruction.  Patient had NG tube placed.  CT revealed thickening of the antrum of the stomach.  She was started on Protonix.  NG was removed on 10/23/2023.  No recurrence of the symptoms after removal of the NG tube.    *Anemia due to end-stage renal failure.  She is on hemodialysis.  She has anemia secondary to end-stage renal disease.  She receives Retacrit 3 days a week.    10/24/2023: Hemoglobin 8.0.    PLAN:    1.  Continue Verzinio  100 mg twice daily.  2.  Plan to resume Faslodex after discharge.  3.  Daily CBC. She usually receives PRBC Tx for hemoglobin around 7.        Bogdan Perez MD  10/24/23

## 2023-10-24 NOTE — PROGRESS NOTES
LOS: 10 days     Chief Complaint/ Reason for encounter: ESRD management    Subjective   10/19/23 : She feels a little better today  Still with frequent bowel movements but less diarrhea  BP remains low normal, on midodrine  NG tube remains in place, clamped  She remains n.p.o. with occasional nausea    10/20: NG tube remains in place, still with copious amounts of NG output  Not as much diarrhea, no abdominal pain  Scheduled for dialysis today.  Weights very labile and do not seem accurate to me    10/21: She complained of abdominal pain and nausea and only received an hour and a half of her dialysis treatment.  I tried to convince her to complete another hour but patient refused  Scheduled for additional dialysis today  NG tube currently clamped    10/22, about the same today, high NG tube output, continues to have diarrhea  Did tolerate dialysis yesterday after receiving albumin and midodrine for blood pressure support, 3.4 L of fluid removed    10/23: Uneventful night was able to tolerate having her NG tube clamped most of the night without nausea, surgery continues to follow and is considering EGD if high output NG tube persists  HD planned for tomorrow  Plan is [facility] outpatient [facility] at [facility] after DC    10/24: She feels much better today, tolerating clears  NG tube has been removed, decreased diarrhea  Less abdominal pain, no dyspnea, stable edema  .  HD planned today    Medical history reviewed:  History of Present Illness    Subjective    History taken from: Patient and chart    Vital Signs  Temp:  [97.7 °F (36.5 °C)] 97.7 °F (36.5 °C)  Heart Rate:  [70-87] 76  Resp:  [18] 18  BP: (85-99)/(45-59) 98/59       Wt Readings from Last 1 Encounters:   10/24/23 0525 (!) 163 kg (359 lb 8 oz)   10/23/23 0322 (!) 160 kg (353 lb 4.8 oz)   10/21/23 0527 (!) 164 kg (360 lb 11.2 oz)   10/20/23 0457 (!) 167 kg (369 lb 1.6 oz)   10/19/23 0500 (!) 162 kg (356 lb 11.2 oz)   10/18/23 0510 (!) 166 kg (365 lb)  "  10/17/23 0537 (!) 168 kg (371 lb 1.6 oz)   10/14/23 1634 (!) 176 kg (387 lb)   10/14/23 0000 (!) 176 kg (387 lb 6.4 oz)   10/13/23 1251 (!) 154 kg (340 lb)       Objective:  Vital signs: (most recent): Blood pressure 98/59, pulse 76, temperature 97.7 °F (36.5 °C), temperature source Oral, resp. rate 18, height 170.2 cm (67\"), weight (!) 163 kg (359 lb 8 oz), SpO2 97%, not currently breastfeeding.                Objective:  General Appearance:  Comfortable, obese, ill-appearing, in no acute distress and not in pain.  Awake, alert, oriented  HEENT: Mucous membranes moist, no injury, oropharynx clear, NG tube in place with high output  Lungs:  Normal effort and normal respiratory rate.  Breath sounds clear to auscultation.  No  respiratory distress.  No rales, decreased breath sounds or rhonchi.    Heart: Normal rate.  Regular rhythm.  S1, S2 normal.  No murmur.   Abdomen: Abdomen is soft.  Bowel sounds are normal, no abdominal tenderness.  There is no rebound or guarding  Extremities: Trace edema of bilateral lower extremities  Neurological: No focal motor or sensory deficits, pupils reactive  Skin:  Warm and dry.  No rash or cyanosis.       Results Review:    Intake/Output:     Intake/Output Summary (Last 24 hours) at 10/24/2023 0955  Last data filed at 10/23/2023 2130  Gross per 24 hour   Intake 240 ml   Output --   Net 240 ml         DATA:  Radiology and Labs:  The following labs independently reviewed by me. Additional labs ordered for tomorrow a.m.  Interval notes, chart personally reviewed by me.   Old records independently reviewed showing ESRD on dialysis  Discussed with patient    Risk/ complexity of medical care/ medical decision making moderate, dialysis management    Labs:   Recent Results (from the past 24 hour(s))   POC Glucose Once    Collection Time: 10/23/23 11:29 AM    Specimen: Blood   Result Value Ref Range    Glucose 159 (H) 70 - 130 mg/dL   POC Glucose Once    Collection Time: 10/23/23  4:34 PM "    Specimen: Blood   Result Value Ref Range    Glucose 206 (H) 70 - 130 mg/dL   POC Glucose Once    Collection Time: 10/23/23  8:51 PM    Specimen: Blood   Result Value Ref Range    Glucose 212 (H) 70 - 130 mg/dL   Basic Metabolic Panel    Collection Time: 10/24/23  4:12 AM    Specimen: Blood   Result Value Ref Range    Glucose 179 (H) 65 - 99 mg/dL    BUN 42 (H) 8 - 23 mg/dL    Creatinine 6.73 (H) 0.57 - 1.00 mg/dL    Sodium 132 (L) 136 - 145 mmol/L    Potassium 4.3 3.5 - 5.2 mmol/L    Chloride 94 (L) 98 - 107 mmol/L    CO2 26.2 22.0 - 29.0 mmol/L    Calcium 8.0 (L) 8.6 - 10.5 mg/dL    BUN/Creatinine Ratio 6.2 (L) 7.0 - 25.0    Anion Gap 11.8 5.0 - 15.0 mmol/L    eGFR 6.4 (L) >60.0 mL/min/1.73   CBC Auto Differential    Collection Time: 10/24/23  4:12 AM    Specimen: Blood   Result Value Ref Range    WBC 8.11 3.40 - 10.80 10*3/mm3    RBC 2.61 (L) 3.77 - 5.28 10*6/mm3    Hemoglobin 8.0 (L) 12.0 - 15.9 g/dL    Hematocrit 24.8 (L) 34.0 - 46.6 %    MCV 95.0 79.0 - 97.0 fL    MCH 30.7 26.6 - 33.0 pg    MCHC 32.3 31.5 - 35.7 g/dL    RDW 15.6 (H) 12.3 - 15.4 %    RDW-SD 53.1 37.0 - 54.0 fl    MPV 10.4 6.0 - 12.0 fL    Platelets 189 140 - 450 10*3/mm3    Neutrophil % 75.3 42.7 - 76.0 %    Lymphocyte % 9.1 (L) 19.6 - 45.3 %    Monocyte % 9.2 5.0 - 12.0 %    Eosinophil % 1.7 0.3 - 6.2 %    Basophil % 0.4 0.0 - 1.5 %    Immature Grans % 4.3 (H) 0.0 - 0.5 %    Neutrophils, Absolute 6.10 1.70 - 7.00 10*3/mm3    Lymphocytes, Absolute 0.74 0.70 - 3.10 10*3/mm3    Monocytes, Absolute 0.75 0.10 - 0.90 10*3/mm3    Eosinophils, Absolute 0.14 0.00 - 0.40 10*3/mm3    Basophils, Absolute 0.03 0.00 - 0.20 10*3/mm3    Immature Grans, Absolute 0.35 (H) 0.00 - 0.05 10*3/mm3    nRBC 0.1 0.0 - 0.2 /100 WBC   POC Glucose Once    Collection Time: 10/24/23  8:00 AM    Specimen: Blood   Result Value Ref Range    Glucose 178 (H) 70 - 130 mg/dL       Radiology:  Pertinent radiology studies were reviewed as described above    Medications have been  reviewed separately in chart overview    ASSESSMENT:  ESRD on dialysis, Monday Wednesday Friday.  Normally on HHD 5 days a week at home, plan is for inpatient HD after discharge  Metastatic breast cancer  Obesity  Recurrent ascites  Chronic anemia from renal failure and immunotherapy  Hypokalemia, resolved  Nausea, vomiting and diarrhea, better.  NG tube out  Chronic hypotension on midodrine  Type 2 diabetes mellitus  Hypothyroidism    Diabetes type 2, controlled    Combined systolic and diastolic congestive heart failure    ANDREW (obstructive sleep apnea)      Plan:   HD today, Tuesday Thursday Saturday schedule for now  NG tube now out and diet is being advanced  Gradually increase UF with dialysis as oral intake improves  Likely can stop her daily potassium now that GI losses have subsided  Epogen 3 times weekly for anemia  Midodrine 3 times daily for BP support, as needed albumin with dialysis as needed for intradialytic hypotension    Immunotherapy per hematology/oncology, daily CBC  Await outpatient dialysis/rehab plans      Tai Antonio MD  [facility]   Office phone number: [phone]  Answering service phone number: [phone]    10/24/23  09:55 EDT    Dictation performed using Dragon dictation software

## 2023-10-24 NOTE — PROGRESS NOTES
"General Surgery Progress Note      S: Tolerating diet without complication.  Continues to have bowel function.    O:BP 97/40 (BP Location: Right arm, Patient Position: Lying)   Pulse 74   Temp 96.6 °F (35.9 °C) (Temporal)   Resp 16   Ht 170.2 cm (67\")   Wt (!) 163 kg (359 lb 8 oz)   LMP  (LMP Unknown)   SpO2 97%   BMI 56.31 kg/m²     Intake & Output (last day)         10/23 0701  10/24 0700 10/24 0701  10/25 0700    P.O. 240     Total Intake(mL/kg) 240 (1.5)     Urine (mL/kg/hr)  0 (0)    Emesis/NG output      Stool      Dialysis  500    Total Output  500    Net +240 -500          Urine Unmeasured Occurrence 1 x     Stool Unmeasured Occurrence 2 x             GENERAL: awake, alert, no apparent distress  HEENT: normochephalic, atraumatic, moist mucus membranes, clear sclera   CHEST: clear to auscultation, no wheezes, no increased work of breathing  CARDIAC: regular rate and rhythm    ABDOMEN: Soft, nondistended, non-tender  EXTREMITIES: no cyanosis, clubbing or edema    SKIN: Warm and moist, no rashes    LABS  Results from last 7 days   Lab Units 10/24/23  0412 10/23/23  0406 10/22/23  0401 10/21/23  0353   WBC 10*3/mm3 8.11 8.48 8.53 8.18   HEMOGLOBIN g/dL 8.0* 8.9* 9.8* 8.9*   HEMATOCRIT % 24.8* 26.8* 30.7* 28.0*   PLATELETS 10*3/mm3 189 192 185 163   MONOCYTES % %  --   --   --  6.0   EOSINOPHIL % %  --   --   --  1.0     Results from last 7 days   Lab Units 10/24/23  0412 10/23/23  0405 10/22/23  0401   SODIUM mmol/L 132* 134* 135*   POTASSIUM mmol/L 4.3 4.0 4.3   CHLORIDE mmol/L 94* 95* 96*   CO2 mmol/L 26.2 27.0 23.0   BUN mg/dL 42* 36* 26*   CREATININE mg/dL 6.73* 5.90* 4.67*   CALCIUM mg/dL 8.0* 8.8 8.7   BILIRUBIN mg/dL  --   --  0.5   ALK PHOS U/L  --   --  101   ALT (SGPT) U/L  --   --  12   AST (SGOT) U/L  --   --  18   GLUCOSE mg/dL 179* 150* 139*             A/P: 63 y.o. female with peritoneal carcinomatosis with bowel obstruction that has resolved.     Okay to advance diet.  I recommended " continuing GI soft diet indefinitely.    Please call general surgery with any questions or concerns      CLARI FRANKLIN MD  General, Robotic and Endoscopic Surgery  [facility]    [address], [address]  [place], [place], [address]  P: [phone]  F: [phone]

## 2023-10-24 NOTE — PLAN OF CARE
Goal Outcome Evaluation:  Plan of Care Reviewed With: patient           Outcome Evaluation: VSS, no c/o pain. Pt refused to turn at times--educated on dangers and risks. SCDs placed. Pt appeared to sleep some between care. Pt to have dialysis today. Will CTM, safety maintained.

## 2023-10-24 NOTE — PLAN OF CARE
Goal Outcome Evaluation:   Blood pressures trending low.  Received dialysis today.  Not up out of bed today.  Turned.  On room air throughout shift.  Complains of pain abdomen, Right lower quadrant, when turned to R side.  History of surgery in that area - firmness, scars present.  Estimated discharge date: unknown.

## 2023-10-24 NOTE — NURSING NOTE
HD completed. 500ml removed. Low BP before and during HD. Albumin x 2 given. No complications. Verbal report given. Post TX BP 97/40 HR 74

## 2023-10-25 LAB
ABO GROUP BLD: NORMAL
ANION GAP SERPL CALCULATED.3IONS-SCNC: 10 MMOL/L (ref 5–15)
BASOPHILS # BLD AUTO: 0.02 10*3/MM3 (ref 0–0.2)
BASOPHILS NFR BLD AUTO: 0.3 % (ref 0–1.5)
BLD GP AB SCN SERPL QL: NEGATIVE
BUN SERPL-MCNC: 23 MG/DL (ref 8–23)
BUN/CREAT SERPL: 5.2 (ref 7–25)
CALCIUM SPEC-SCNC: 8.3 MG/DL (ref 8.6–10.5)
CHLORIDE SERPL-SCNC: 97 MMOL/L (ref 98–107)
CO2 SERPL-SCNC: 28 MMOL/L (ref 22–29)
CREAT SERPL-MCNC: 4.42 MG/DL (ref 0.57–1)
DEPRECATED RDW RBC AUTO: 54.3 FL (ref 37–54)
EGFRCR SERPLBLD CKD-EPI 2021: 10.5 ML/MIN/1.73
EOSINOPHIL # BLD AUTO: 0.13 10*3/MM3 (ref 0–0.4)
EOSINOPHIL NFR BLD AUTO: 2 % (ref 0.3–6.2)
ERYTHROCYTE [DISTWIDTH] IN BLOOD BY AUTOMATED COUNT: 15.6 % (ref 12.3–15.4)
GLUCOSE BLDC GLUCOMTR-MCNC: 177 MG/DL (ref 70–130)
GLUCOSE BLDC GLUCOMTR-MCNC: 191 MG/DL (ref 70–130)
GLUCOSE BLDC GLUCOMTR-MCNC: 219 MG/DL (ref 70–130)
GLUCOSE BLDC GLUCOMTR-MCNC: 256 MG/DL (ref 70–130)
GLUCOSE SERPL-MCNC: 191 MG/DL (ref 65–99)
HCT VFR BLD AUTO: 23.4 % (ref 34–46.6)
HGB BLD-MCNC: 7.4 G/DL (ref 12–15.9)
IMM GRANULOCYTES # BLD AUTO: 0.16 10*3/MM3 (ref 0–0.05)
IMM GRANULOCYTES NFR BLD AUTO: 2.4 % (ref 0–0.5)
LYMPHOCYTES # BLD AUTO: 0.62 10*3/MM3 (ref 0.7–3.1)
LYMPHOCYTES NFR BLD AUTO: 9.4 % (ref 19.6–45.3)
MCH RBC QN AUTO: 31.1 PG (ref 26.6–33)
MCHC RBC AUTO-ENTMCNC: 31.6 G/DL (ref 31.5–35.7)
MCV RBC AUTO: 98.3 FL (ref 79–97)
MONOCYTES # BLD AUTO: 0.66 10*3/MM3 (ref 0.1–0.9)
MONOCYTES NFR BLD AUTO: 10 % (ref 5–12)
NEUTROPHILS NFR BLD AUTO: 4.98 10*3/MM3 (ref 1.7–7)
NEUTROPHILS NFR BLD AUTO: 75.9 % (ref 42.7–76)
NRBC BLD AUTO-RTO: 0.2 /100 WBC (ref 0–0.2)
PLATELET # BLD AUTO: 155 10*3/MM3 (ref 140–450)
PMV BLD AUTO: 10.3 FL (ref 6–12)
POTASSIUM SERPL-SCNC: 4 MMOL/L (ref 3.5–5.2)
RBC # BLD AUTO: 2.38 10*6/MM3 (ref 3.77–5.28)
RH BLD: POSITIVE
SODIUM SERPL-SCNC: 135 MMOL/L (ref 136–145)
T&S EXPIRATION DATE: NORMAL
WBC NRBC COR # BLD: 6.57 10*3/MM3 (ref 3.4–10.8)

## 2023-10-25 PROCEDURE — 86922 COMPATIBILITY TEST ANTIGLOB: CPT

## 2023-10-25 PROCEDURE — 85025 COMPLETE CBC W/AUTO DIFF WBC: CPT | Performed by: HOSPITALIST

## 2023-10-25 PROCEDURE — 86920 COMPATIBILITY TEST SPIN: CPT

## 2023-10-25 PROCEDURE — 82948 REAGENT STRIP/BLOOD GLUCOSE: CPT

## 2023-10-25 PROCEDURE — P9016 RBC LEUKOCYTES REDUCED: HCPCS

## 2023-10-25 PROCEDURE — 36430 TRANSFUSION BLD/BLD COMPNT: CPT

## 2023-10-25 PROCEDURE — 97530 THERAPEUTIC ACTIVITIES: CPT

## 2023-10-25 PROCEDURE — 86900 BLOOD TYPING SEROLOGIC ABO: CPT

## 2023-10-25 PROCEDURE — 86901 BLOOD TYPING SEROLOGIC RH(D): CPT | Performed by: INTERNAL MEDICINE

## 2023-10-25 PROCEDURE — 97110 THERAPEUTIC EXERCISES: CPT

## 2023-10-25 PROCEDURE — 86900 BLOOD TYPING SEROLOGIC ABO: CPT | Performed by: INTERNAL MEDICINE

## 2023-10-25 PROCEDURE — 80048 BASIC METABOLIC PNL TOTAL CA: CPT | Performed by: INTERNAL MEDICINE

## 2023-10-25 PROCEDURE — 86902 BLOOD TYPE ANTIGEN DONOR EA: CPT

## 2023-10-25 PROCEDURE — 99232 SBSQ HOSP IP/OBS MODERATE 35: CPT | Performed by: INTERNAL MEDICINE

## 2023-10-25 PROCEDURE — 25010000002 EPOETIN ALFA-EPBX 20000 UNIT/ML SOLUTION: Performed by: INTERNAL MEDICINE

## 2023-10-25 PROCEDURE — 86850 RBC ANTIBODY SCREEN: CPT | Performed by: INTERNAL MEDICINE

## 2023-10-25 PROCEDURE — 63710000001 INSULIN LISPRO (HUMAN) PER 5 UNITS: Performed by: STUDENT IN AN ORGANIZED HEALTH CARE EDUCATION/TRAINING PROGRAM

## 2023-10-25 PROCEDURE — 97164 PT RE-EVAL EST PLAN CARE: CPT

## 2023-10-25 RX ORDER — MIDODRINE HYDROCHLORIDE 5 MG/1
10 TABLET ORAL
Status: DISCONTINUED | OUTPATIENT
Start: 2023-10-25 | End: 2023-10-26 | Stop reason: HOSPADM

## 2023-10-25 RX ORDER — HEPARIN SODIUM 1000 [USP'U]/ML
4000 INJECTION, SOLUTION INTRAVENOUS; SUBCUTANEOUS AS NEEDED
Status: CANCELLED | OUTPATIENT
Start: 2023-10-25

## 2023-10-25 RX ADMIN — INSULIN LISPRO 6 UNITS: 100 INJECTION, SOLUTION INTRAVENOUS; SUBCUTANEOUS at 18:13

## 2023-10-25 RX ADMIN — SERTRALINE 150 MG: 100 TABLET, FILM COATED ORAL at 09:23

## 2023-10-25 RX ADMIN — Medication 10 ML: at 20:30

## 2023-10-25 RX ADMIN — Medication 1000 MCG: at 09:23

## 2023-10-25 RX ADMIN — MUPIROCIN 1 APPLICATION: 20 OINTMENT TOPICAL at 09:23

## 2023-10-25 RX ADMIN — Medication 10 ML: at 09:24

## 2023-10-25 RX ADMIN — INSULIN LISPRO 2 UNITS: 100 INJECTION, SOLUTION INTRAVENOUS; SUBCUTANEOUS at 13:12

## 2023-10-25 RX ADMIN — INSULIN LISPRO 2 UNITS: 100 INJECTION, SOLUTION INTRAVENOUS; SUBCUTANEOUS at 21:45

## 2023-10-25 RX ADMIN — Medication 1 TABLET: at 09:23

## 2023-10-25 RX ADMIN — INSULIN LISPRO 4 UNITS: 100 INJECTION, SOLUTION INTRAVENOUS; SUBCUTANEOUS at 09:22

## 2023-10-25 RX ADMIN — MIDODRINE HYDROCHLORIDE 5 MG: 5 TABLET ORAL at 07:29

## 2023-10-25 RX ADMIN — ATORVASTATIN CALCIUM 40 MG: 20 TABLET, FILM COATED ORAL at 20:26

## 2023-10-25 RX ADMIN — FOLIC ACID 1 MG: 1 TABLET ORAL at 09:23

## 2023-10-25 RX ADMIN — MIDODRINE HYDROCHLORIDE 10 MG: 5 TABLET ORAL at 13:11

## 2023-10-25 RX ADMIN — GABAPENTIN 300 MG: 300 CAPSULE ORAL at 20:26

## 2023-10-25 RX ADMIN — MIDODRINE HYDROCHLORIDE 10 MG: 5 TABLET ORAL at 18:13

## 2023-10-25 RX ADMIN — PANTOPRAZOLE SODIUM 40 MG: 40 INJECTION, POWDER, FOR SOLUTION INTRAVENOUS at 07:29

## 2023-10-25 RX ADMIN — EPOETIN ALFA-EPBX 20000 UNITS: 20000 INJECTION, SOLUTION INTRAVENOUS; SUBCUTANEOUS at 09:23

## 2023-10-25 RX ADMIN — LEVOTHYROXINE SODIUM 50 MCG: 50 TABLET ORAL at 07:29

## 2023-10-25 NOTE — CASE MANAGEMENT/SOCIAL WORK
Continued Stay Note  [facility]     Patient Name: Juana Hall  MRN: [id number]  Today's Date: 10/25/2023    Admit Date: 10/13/2023    Plan: DC to [facility], will need ambulance transport   Discharge Plan       Row Name 10/25/23 0914       Plan    Plan DC to [facility], will need ambulance transport    Plan Comments Requested by attending MD to start pre-cert on pt for admission to [facility]. Contacted Pedro Luis/Gina and informed that pre-cert has been initiated. All dialysis information has been previously faxed to [facility]. Informed pt and spouse of status.                   Discharge Codes    No documentation.                 Expected Discharge Date and Time       Expected Discharge Date Expected Discharge Time    Oct 26, 2023               Anjali Cerrato RN

## 2023-10-25 NOTE — THERAPY PROGRESS REPORT/RE-CERT
Patient Name: Juana Hall  : 1960    MRN: [id number]                              Today's Date: 10/25/2023       Admit Date: 10/13/2023    Visit Dx:     ICD-10-CM ICD-9-CM   1. Hypotension, unspecified hypotension type  I95.9 458.9   2. Acute UTI  N39.0 599.0   3. End-stage renal disease on hemodialysis  N18.6 585.6    Z99.2 V45.11   4. Other fracture of left foot, initial encounter for closed fracture  S92.812A 825.20   5. Anemia in stage 3 chronic kidney disease, unspecified whether stage 3a or 3b CKD  N18.30 285.21    D63.1 585.3     Patient Active Problem List   Diagnosis    Anxiety    Depression    Diabetes type 2, controlled    Hyperlipidemia    Heart murmur    Hypertension    Post-traumatic osteoarthritis of multiple joints    Psoriasis    Radiculopathy    Acquired hypothyroidism    Peripheral neuropathy    Normocytic anemia    History of right breast cancer    Omental mass    Primary malignant neoplasm of breast with metastasis    Elevated serum creatinine    Iron deficiency anemia    Anemia in stage 3 chronic kidney disease    Stage 3b chronic kidney disease    Anxiety and depression    RYAN (acute kidney injury)    Anemia, chronic disease    Diastolic CHF, chronic    Frequent UTI    Metabolic acidosis    Severe malnutrition    Hydronephrosis    Generalized weakness    COVID-19 virus detected    Hypocalcemia    Morbid obesity with BMI of 50.0-59.9, adult    ESRD (end stage renal disease)    Pancytopenia due to chemotherapy    Chronic anemia    Rectal bleeding    Bicuspid aortic valve    Complicated UTI (urinary tract infection)    Acute UTI (urinary tract infection)    Severe aortic stenosis    Combined systolic and diastolic congestive heart failure    Depression    ANDREW (obstructive sleep apnea)    Diastolic CHF, chronic    Adverse effect of chemotherapy    UTI (urinary tract infection)    Hematuria    Dysuria    Intractable vomiting    Hypotension    Abnormal urinalysis    Hypokalemia     Past  Medical History:   Diagnosis Date    Anemia     Anxiety and depression     Bicuspid aortic valve     had surgery    Breast cancer 2004    RIGHT, HAD NEELA. 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    History of sepsis 2010    KIDNEY STONE    History of transfusion     no reaction    Hyperlipidemia     Hyperthyroidism     Hypothyroidism     Kidney stone     CURRENT LEFT-DEC 2021    Lymphedema of leg     LEFT    Metastasis from breast cancer 2019    STOMACH-OMENTUM    Neuromuscular disorder     Obesity     ANDREW on CPAP     CPAP    Osteoarthrosis     Peripheral neuropathy     FEET BILAT    Radiculopathy     Rectal bleeding 08/16/2022    Thickened endometrium     Urinary incontinence     wears pads    Weakness     BILAT LEGS-WITH ANY AMT OF TIME     Past Surgical History:   Procedure Laterality Date    AORTIC VALVULOPLASTY  01/2023    ARTERIOVENOUS FISTULA/SHUNT SURGERY Left 11/03/2021    Procedure: LEFT  BRACHIOCEPALIC ARTERIOVENOUS FISTULA;  Surgeon: Dejuan Adler MD;  Location: [facility] MAIN OR;  Service: Vascular;  Laterality: Left;    BREAST RECONSTRUCTION  2004    WITH IMPLANTS, AND REVISION, NOW REMOVED    BREAST SURGERY Bilateral 2004    breast implants removed and then replaced due to infection    CARDIAC CATHETERIZATION N/A 08/23/2022    Procedure: CORONARY ANGIOGRAPHY;  Surgeon: Luis Rivers MD;  Location: [facility] CATH INVASIVE LOCATION;  Service: Cardiology;  Laterality:  N/A;    CARDIAC CATHETERIZATION N/A 2022    Procedure: Right Heart Cath;  Surgeon: Luis Rivers MD;  Location: [facility] CATH INVASIVE LOCATION;  Service: Cardiology;  Laterality: N/A;    CARDIAC CATHETERIZATION N/A 01/10/2023    Procedure: Valvuloplasty;  Surgeon: Luis Rivers MD;  [facility];  Service: Cardiovascular;  Laterality: N/A;  01/10/2023    CARDIAC CATHETERIZATION N/A 01/10/2023    Procedure: Aortic root aortogram;  Surgeon: Luis Rivers MD;  [facility];  Service: Cardiovascular;  Laterality: N/A;    CATARACT EXTRACTION WITH INTRAOCULAR LENS IMPLANT Bilateral      SECTION  1987     SECTION  1987    CYSTOSCOPY W/ URETERAL STENT PLACEMENT      CYSTOSCOPY W/ URETERAL STENT PLACEMENT Left 2021    Procedure: CYSTOSCOPY KEFT RETROGRADE PYELOGRAM  LEFT  URETERAL STENT PLACEMENT;  Surgeon: Leodan Mckee Jr., MD;  Location: [facility] OR;  Service: Urology;  Laterality: Left;    CYSTOSCOPY W/ URETERAL STENT PLACEMENT Left 03/10/2022    Procedure: CYSTOSCOPY AND LEFT URETERAL STENT EXCHANGE, RETROGRADE PYELOGRAM;  Surgeon: Leodan Mckee Jr., MD;  Location: [facility] OR;  Service: Urology;  Laterality: Left;    CYSTOSCOPY W/ URETERAL STENT PLACEMENT Left 2023    Procedure: CYSTOSCOPY WITH LEFT URETERAL STENT PLACEMENT, RETROGRADE PYELOGRAM, CLOT EVACUATION, BLADDER FULGARATION;  Surgeon: eLodan Mckee Jr., MD;  Location: [facility] MAIN OR;  Service: Urology;  Laterality: Left;    D & C HYSTEROSCOPY N/A 2017    Procedure: DILATATION AND CURETTAGE, HYSTEROSCOPY ;  Surgeon: Cherie Oliveros MD;  Location: [facility] OR [facility];  Service:     D & C HYSTEROSCOPY N/A 2019    Procedure: DILATATION AND CURETTAGE HYSTEROSCOPY/POLYPECTOMY;  Surgeon: Cherie Oliveros MD;  [facility];  Service: Gynecology    D & C HYSTEROSCOPY ENDOMETRIAL ABLATION N/A  5/5/2023    Procedure: DILATATION AND CURETTAGE;  Surgeon: Ina Marcos MD;  Location: [facility] MAIN OR;  Service: Obstetrics/Gynecology;  Laterality: N/A;    ENDOSCOPY      INSERTION AND REMOVAL HEMODIALYSIS CATHETER Right 05/01/2021    INSERTION HEMODIALYSIS CATHETER Right 05/01/2021    Procedure: HEMODIALYSIS CATHETER INSERTION;  Surgeon: Clint Heránndez MD;  Location: [facility] MAIN OR;  Service: Vascular;  Laterality: Right;    LYMPH NODE BIOPSY      MASTECTOMY Bilateral 2004    VENOUS ACCESS DEVICE (PORT) INSERTION AND REMOVAL        General Information       Row Name 10/25/23 1548          Physical Therapy Time and Intention    Document Type therapy note (daily note);progress note/recertification  -DJ     Mode of Treatment individual therapy;physical therapy  -DJ       Row Name 10/25/23 1548          General Information    Patient Profile Reviewed yes  -DJ     Existing Precautions/Restrictions fall  -DJ       Row Name 10/25/23 1548          Cognition    Orientation Status (Cognition) oriented x 4  -DJ       Row Name 10/25/23 1547          Safety Issues, Functional Mobility    Comment, Safety Issues/Impairments (Mobility) gt belt, nonskid socks  -DJ               User Key  (r) = Recorded By, (t) = Taken By, (c) = Cosigned By      Initials Name Provider Type    DJ Sandra Prieto, PT Physical Therapist                   Mobility       Row Name 10/25/23 8097          Bed Mobility    Bed Mobility supine-sit;sit-supine  -DJ     Supine-Sit [place] (Bed Mobility) 2 person assist;moderate assist (50% patient effort);verbal cues  -DJ     Sit-Supine [place] (Bed Mobility) dependent (less than 25% patient effort);2 person assist  assist of 3 to return supine and reposition in bed  -DJ     Assistive Device (Bed Mobility) bed rails;draw sheet;head of bed elevated  -DJ     Comment, (Bed Mobility) able to use UE with trapeze bar to try to assist with bed mobility  -DJ       Row Name 10/25/23 6492          Transfers     Comment, (Transfers) sit/stand from EOB  -DJ       Row Name 10/25/23 1551          Bed-Chair Transfer    Bed-Chair Trail (Transfers) unable to assess  -DJ       Row Name 10/25/23 1551          Sit-Stand Transfer    Sit-Stand Trail (Transfers) maximum assist (25% patient effort);2 person assist;verbal cues  -DJ     Comment, (Sit-Stand Transfer) unable to fully straighten knees or stand fully upright  -DJ       Row Name 10/25/23 1551          Gait/Stairs (Locomotion)    Trail Level (Gait) unable to assess  -DJ               User Key  (r) = Recorded By, (t) = Taken By, (c) = Cosigned By      Initials Name Provider Type    Sandra Mays, [place] Physical Therapist                   Obj/Interventions       Row Name 10/25/23 1553          Motor Skills    Motor Skills functional endurance  -DJ     Functional Endurance poor  -DJ       Row Name 10/25/23 1553          Balance    Balance Assessment sitting static balance;standing static balance  -DJ     Static Sitting Balance contact guard;verbal cues  -DJ     Position, Sitting Balance supported;sitting edge of bed  -DJ     Static Standing Balance maximum assist;2-person assist;verbal cues  -DJ     Position/Device Used, Standing Balance supported  -DJ     Balance Interventions sitting;sit to stand;supported  -DJ               User Key  (r) = Recorded By, (t) = Taken By, (c) = Cosigned By      Initials Name Provider Type    Sandra Mays, PT Physical Therapist                   Goals/Plan       Row Name 10/25/23 1601          Bed Mobility Goal 1 (PT)    Time Frame (Bed Mobility Goal 1, PT) 10 days  -DJ     Progress/Outcomes (Bed Mobility Goal 1, PT) progress slower than expected;goal ongoing  -DJ       Row Name 10/25/23 1601          Transfer Goal 1 (PT)    Time Frame (Transfer Goal 1, PT) 10 days  -DJ     Progress/Outcome (Transfer Goal 1, PT) progress slower than expected;goal ongoing  -DJ       Row Name 10/25/23 1601          Gait Training Goal 1 (PT)     Progress/Outcome (Gait Training Goal 1, PT) progress slower than expected;goal ongoing  -DJ               User Key  (r) = Recorded By, (t) = Taken By, (c) = Cosigned By      Initials Name Provider Type    Sandra Mays, PT Physical Therapist                   Clinical Impression       Row Name 10/25/23 4064          Pain    Pretreatment Pain Rating 0/10 - no pain  -DJ       Row Name 10/25/23 0777          Plan of Care Review    Plan of Care Reviewed With patient  -DJ     Progress no change  -DJ     Outcome Evaluation Pt resting in bed upon PT arrival - initially declined services due to  being in ER but she reluctantly agreed to participate. Pt req mod A of 2 to sit EOB and dem fair sitting balance at EOB. Pt attempted to stand from raised EOB with max A of 2 - unable to fully straighten knees or stand fully upright. When pt returned to EOB, she was very close to EOB on slippery mattress. Pt was dependent x 3 to return supine in bed safely. She did attempt to use UE with trapeze bar to help reposition but she was still dependent to reposition. Poor tolerance of therapy today but pt was distracted by phone calls regariding her  being in ER. Cont PT to address functional deficits and prepare for d/c. All goals ongoing  -DJ       Row Name 10/25/23 3306          Therapy Assessment/Plan (PT)    Patient/Family Therapy Goals Statement (PT) rehab  -DJ     Criteria for Skilled Interventions Met (PT) skilled treatment is necessary  -DJ     Therapy Frequency (PT) 5 times/wk  -DJ       Row Name 10/25/23 9166          Vital Signs    Pre Patient Position Supine  -DJ     Intra Patient Position Standing  -DJ     Post Patient Position Supine  -DJ       Row Name 10/25/23 0765          Positioning and Restraints    Pre-Treatment Position in bed  -DJ     Post Treatment Position bed  -DJ     In Bed notified nsg;supine;call light within reach;encouraged to call for assist;exit alarm on  -DJ               User Key  (r)  = Recorded By, (t) = Taken By, (c) = Cosigned By      Initials Name Provider Type    Sandra Mays, PT Physical Therapist                   Outcome Measures       Row Name 10/25/23 1559 10/25/23 0925       How much help from another person do you currently need...    Turning from your back to your side while in flat bed without using bedrails? 2  -DJ 2  -GP    Moving from lying on back to sitting on the side of a flat bed without bedrails? 2  -DJ 2  -GP    Moving to and from a bed to a chair (including a wheelchair)? 1  -DJ 2  -GP    Standing up from a chair using your arms (e.g., wheelchair, bedside chair)? 1  -DJ 2  -GP    Climbing 3-5 steps with a railing? 1  -DJ 1  -GP    To walk in hospital room? 1  -DJ 1  -GP    AM-PAC 6 Clicks Score (PT) 8  -DJ 10  -GP    Highest level of mobility 3 --> Sat at edge of bed  -DJ 4 --> Transferred to chair/commode  -GP      Row Name 10/25/23 1559 10/25/23 0949       Functional Assessment    Outcome Measure Options AM-PAC 6 Clicks Basic Mobility (PT)  -CONSTANZA AM-PAC 6 Clicks Daily Activity (OT)  -VERÓNICA              User Key  (r) = Recorded By, (t) = Taken By, (c) = Cosigned By      Initials Name Provider Type    GP Emily Overton, RN Registered Nurse    Sandra Mays, PT Physical Therapist    Glenis Laureano, OT Occupational Therapist                                 Physical Therapy Education       Title: PT OT SLP Therapies (Done)       Topic: Physical Therapy (Done)       Point: Mobility training (Done)       Learning Progress Summary             Patient Acceptance, E, VU by CONSTANZA at 10/25/2023 1559    Eager, DEMAR,TB,D, VU,NR by ADRIA at 10/23/2023 1557    Acceptance, E,TB,D, VU,NR by ALFA at 10/22/2023 1504    Gregorer, DEMAR,TB,D, VU by ADRIA at 10/20/2023 1345    Acceptance, E, NR by AR at 10/18/2023 1549    Acceptance, E,D, DU by PC at 10/16/2023 1629    Acceptance, E, VU by BL at 10/14/2023 0630   Family DEMAR Villatoro,TB,D, AARON,NR by ADRIA at 10/23/2023 1557    DEMAR Villatoro TB, D, AARON by ADRIA at 10/20/2023  1345                         Point: Home exercise program (Done)       Learning Progress Summary             Patient Eager, E,TB,D, VU,NR by ADRIA at 10/23/2023 1557    Acceptance, E,TB,D, VU,NR by ALFA at 10/22/2023 1504    Eager, E,TB,D, VU by ADRIA at 10/20/2023 1345    Acceptance, E, NR by AR at 10/18/2023 1549    Acceptance, E,D, DU by PC at 10/16/2023 1629    Acceptance, E, VU by BL at 10/14/2023 0630   Family Eager, E,TB,D, VU,NR by ADRIA at 10/23/2023 1557    Eager, E,TB,D, VU by ADRIA at 10/20/2023 1345                         Point: Body mechanics (Done)       Learning Progress Summary             Patient Acceptance, E, VU by CONSTANZA at 10/25/2023 1559    Eager, E,TB,D, VU,NR by ADRIA at 10/23/2023 1557    Acceptance, E,TB,D, VU,NR by ALFA at 10/22/2023 1504    Eager, E,TB,D, VU by ADRIA at 10/20/2023 1345    Acceptance, E, NR by AR at 10/18/2023 1549    Acceptance, E,D, DU by PC at 10/16/2023 1629    Acceptance, E, VU by BL at 10/14/2023 0630   Family Eager, E,TB,D, VU,NR by ADRIA at 10/23/2023 1557    Eager, E,TB,D, VU by ADRIA at 10/20/2023 1345                         Point: Precautions (Done)       Learning Progress Summary             Patient Acceptance, E, VU by CONSTANZA at 10/25/2023 1559    Eager, E,TB,D, VU,NR by ADRIA at 10/23/2023 1557    Acceptance, E,TB,D, VU,NR by SM at 10/22/2023 1504    Eager, E,TB,D, VU by ADRIA at 10/20/2023 1345    Acceptance, E, NR by AR at 10/18/2023 1549    Acceptance, E,D, DU by PC at 10/16/2023 1629    Acceptance, E,D, VU,NR by  at 10/15/2023 1148    Acceptance, AARON BENZ by QUITA at 10/14/2023 0630   Family DEMAR Villatoro,KENNY ARREOLA, AARON,NR by ADRIA at 10/23/2023 1557    DEMAR Villatoro TB, D, AARON by ADRIA at 10/20/2023 1345                                         User Key       Initials Effective Dates Name Provider Type Discipline    PC 06/16/21 -  Sabi Kevin, PT Physical Therapist PT    JM 03/07/18 -  Ana Parisi PTA Physical Therapist Assistant PT    AR 06/16/21 -  Maria E Negrete, [place] Physical Therapist PT    SM 03/07/18 -   Apolonia Parisi, PTA Physical Therapist Assistant PT    MG 05/24/22 -  Karla Bhatia, PT Physical Therapist PT    DJ 10/25/19 -  Sandra Prieto PT Physical Therapist PT    BL 10/18/22 -  Zainab Grace, RN Registered Nurse Nurse                  PT Recommendation and Plan     Plan of Care Reviewed With: patient  Progress: no change  Outcome Evaluation: Pt resting in bed upon PT arrival - initially declined services due to  being in ER but she reluctantly agreed to participate. Pt req mod A of 2 to sit EOB and dem fair sitting balance at EOB. Pt attempted to stand from raised EOB with max A of 2 - unable to fully straighten knees or stand fully upright. When pt returned to EOB, she was very close to EOB on slippery mattress. Pt was dependent x 3 to return supine in bed safely. She did attempt to use UE with trapeze bar to help reposition but she was still dependent to reposition. Poor tolerance of therapy today but pt was distracted by phone calls regariding her  being in ER. Cont PT to address functional deficits and prepare for d/c. All goals ongoing     Time Calculation:         PT Charges       Row Name 10/25/23 1602 10/25/23 1600          Time Calculation    Start Time -- 1351  -DJ     Stop Time -- 1414  -DJ     Time Calculation (min) -- 23 min  -DJ     PT Non-Billable Time (min) -- 10 min  -DJ     PT Received On -- 10/25/23  -DJ     PT - Next Appointment -- 10/26/23  -DJ     PT Goal Re-Cert Due Date 11/04/23  -DJ --               User Key  (r) = Recorded By, (t) = Taken By, (c) = Cosigned By      Initials Name Provider Type    Sandra Mays PT Physical Therapist                  Therapy Charges for Today       Code Description Service Date Service Provider Modifiers Qty    09045493275 HC PT RE-EVAL ESTABLISHED PLAN 2 10/25/2023 Sandra Prieto, PT GP 1    92554571259 HC PT THERAPEUTIC ACT EA 15 MIN 10/25/2023 Sandra Prieto, PT GP 2    67523548490 HC PT THER SUPP EA 15 MIN 10/25/2023 Conner  Sandra, PT GP 2            PT G-Codes  Outcome Measure Options: AM-PAC 6 Clicks Basic Mobility (PT)  AM-PAC 6 Clicks Score (PT): 8  AM-PAC 6 Clicks Score (OT): 12  PT Discharge Summary  Anticipated Discharge Disposition (PT): skilled nursing facility    Sandra Prieto, PT  10/25/2023

## 2023-10-25 NOTE — PROGRESS NOTES
LOS: 11 days     Chief Complaint/ Reason for encounter: ESRD management    Subjective   10/19/23 : She feels a little better today  Still with frequent bowel movements but less diarrhea  BP remains low normal, on midodrine  NG tube remains in place, clamped  She remains n.p.o. with occasional nausea    10/20: NG tube remains in place, still with copious amounts of NG output  Not as much diarrhea, no abdominal pain  Scheduled for dialysis today.  Weights very labile and do not seem accurate to me    10/21: She complained of abdominal pain and nausea and only received an hour and a half of her dialysis treatment.  I tried to convince her to complete another hour but patient refused  Scheduled for additional dialysis today  NG tube currently clamped    10/22, about the same today, high NG tube output, continues to have diarrhea  Did tolerate dialysis yesterday after receiving albumin and midodrine for blood pressure support, 3.4 L of fluid removed    10/23: Uneventful night was able to tolerate having her NG tube clamped most of the night without nausea, surgery continues to follow and is considering EGD if high output NG tube persists  HD planned for tomorrow  Plan is for outpatient dialysis at [facility] after DC    10/24: She feels much better today, tolerating clears  NG tube has been removed, decreased diarrhea  Less abdominal pain, no dyspnea, stable edema  .  HD planned today    10/25: She feels better today, advancing diet  Minimal nausea, no vomiting  Denies dyspnea, no edema    Medical history reviewed:  History of Present Illness    Subjective    History taken from: Patient and chart    Vital Signs  Temp:  [96.6 °F (35.9 °C)-99.5 °F (37.5 °C)] 98.1 °F (36.7 °C)  Heart Rate:  [73-74] 73  Resp:  [16-18] 18  BP: (81-97)/(40-54) 81/46       Wt Readings from Last 1 Encounters:   10/25/23 0544 (!) 162 kg (358 lb 0.4 oz)   10/24/23 0525 (!) 163 kg (359 lb 8 oz)   10/23/23 0322 (!) 160 kg (353 lb 4.8 oz)  "  10/21/23 0527 (!) 164 kg (360 lb 11.2 oz)   10/20/23 0457 (!) 167 kg (369 lb 1.6 oz)   10/19/23 0500 (!) 162 kg (356 lb 11.2 oz)   10/18/23 0510 (!) 166 kg (365 lb)   10/17/23 0537 (!) 168 kg (371 lb 1.6 oz)   10/14/23 1634 (!) 176 kg (387 lb)   10/14/23 0000 (!) 176 kg (387 lb 6.4 oz)   10/13/23 1251 (!) 154 kg (340 lb)       Objective:  Vital signs: (most recent): Blood pressure (!) 81/46, pulse 73, temperature 98.1 °F (36.7 °C), temperature source Oral, resp. rate 18, height 170.2 cm (67\"), weight (!) 162 kg (358 lb 0.4 oz), SpO2 94%, not currently breastfeeding.                Objective:  General Appearance:  Comfortable, obese, ill-appearing, in no acute distress and not in pain.  Awake, alert, oriented  HEENT: Mucous membranes moist, no injury, oropharynx clear, NG tube in place with high output  Lungs:  Normal effort and normal respiratory rate.  Breath sounds clear to auscultation.  No  respiratory distress.  No rales, decreased breath sounds or rhonchi.    Heart: Normal rate.  Regular rhythm.  S1, S2 normal.  No murmur.   Abdomen: Abdomen is soft.  Bowel sounds are normal, no abdominal tenderness.  There is no rebound or guarding  Extremities: Trace edema of bilateral lower extremities  Neurological: No focal motor or sensory deficits, pupils reactive  Skin:  Warm and dry.  No rash or cyanosis.       Results Review:    Intake/Output:     Intake/Output Summary (Last 24 hours) at 10/25/2023 1128  Last data filed at 10/24/2023 1200  Gross per 24 hour   Intake --   Output 500 ml   Net -500 ml         DATA:  Radiology and Labs:  The following labs independently reviewed by me. Additional labs ordered for tomorrow a.m.  Interval notes, chart personally reviewed by me.   Old records independently reviewed showing ESRD on dialysis  Discussed with patient    Risk/ complexity of medical care/ medical decision making moderate, dialysis management    Labs:   Recent Results (from the past 24 hour(s))   POC Glucose " Once    Collection Time: 10/24/23  4:28 PM    Specimen: Blood   Result Value Ref Range    Glucose 179 (H) 70 - 130 mg/dL   POC Glucose Once    Collection Time: 10/24/23  9:08 PM    Specimen: Blood   Result Value Ref Range    Glucose 239 (H) 70 - 130 mg/dL   Basic Metabolic Panel    Collection Time: 10/25/23  4:04 AM    Specimen: Blood   Result Value Ref Range    Glucose 191 (H) 65 - 99 mg/dL    BUN 23 8 - 23 mg/dL    Creatinine 4.42 (H) 0.57 - 1.00 mg/dL    Sodium 135 (L) 136 - 145 mmol/L    Potassium 4.0 3.5 - 5.2 mmol/L    Chloride 97 (L) 98 - 107 mmol/L    CO2 28.0 22.0 - 29.0 mmol/L    Calcium 8.3 (L) 8.6 - 10.5 mg/dL    BUN/Creatinine Ratio 5.2 (L) 7.0 - 25.0    Anion Gap 10.0 5.0 - 15.0 mmol/L    eGFR 10.5 (L) >60.0 mL/min/1.73   CBC Auto Differential    Collection Time: 10/25/23  4:04 AM    Specimen: Blood   Result Value Ref Range    WBC 6.57 3.40 - 10.80 10*3/mm3    RBC 2.38 (L) 3.77 - 5.28 10*6/mm3    Hemoglobin 7.4 (L) 12.0 - 15.9 g/dL    Hematocrit 23.4 (L) 34.0 - 46.6 %    MCV 98.3 (H) 79.0 - 97.0 fL    MCH 31.1 26.6 - 33.0 pg    MCHC 31.6 31.5 - 35.7 g/dL    RDW 15.6 (H) 12.3 - 15.4 %    RDW-SD 54.3 (H) 37.0 - 54.0 fl    MPV 10.3 6.0 - 12.0 fL    Platelets 155 140 - 450 10*3/mm3    Neutrophil % 75.9 42.7 - 76.0 %    Lymphocyte % 9.4 (L) 19.6 - 45.3 %    Monocyte % 10.0 5.0 - 12.0 %    Eosinophil % 2.0 0.3 - 6.2 %    Basophil % 0.3 0.0 - 1.5 %    Immature Grans % 2.4 (H) 0.0 - 0.5 %    Neutrophils, Absolute 4.98 1.70 - 7.00 10*3/mm3    Lymphocytes, Absolute 0.62 (L) 0.70 - 3.10 10*3/mm3    Monocytes, Absolute 0.66 0.10 - 0.90 10*3/mm3    Eosinophils, Absolute 0.13 0.00 - 0.40 10*3/mm3    Basophils, Absolute 0.02 0.00 - 0.20 10*3/mm3    Immature Grans, Absolute 0.16 (H) 0.00 - 0.05 10*3/mm3    nRBC 0.2 0.0 - 0.2 /100 WBC   POC Glucose Once    Collection Time: 10/25/23  7:59 AM    Specimen: Blood   Result Value Ref Range    Glucose 219 (H) 70 - 130 mg/dL       Radiology:  Pertinent radiology studies were  reviewed as described above    Medications have been reviewed separately in chart overview    ASSESSMENT:  ESRD on dialysis, Monday Wednesday Friday.  Normally on HHD 5 days a week at home, plan is for inpatient HD after discharge  Metastatic breast cancer  Obesity  Recurrent ascites  Chronic anemia from renal failure and immunotherapy  Hypokalemia, resolved  Nausea, vomiting and diarrhea, better.  NG tube out  Chronic hypotension on midodrine  Type 2 diabetes mellitus  Hypothyroidism    Diabetes type 2, controlled    Combined systolic and diastolic congestive heart failure    ANDRWE (obstructive sleep apnea)      Plan:   BP a little low today in the 80s  Increase midodrine to 10 mg 3 times daily  Tuesday Thursday Saturday schedule for now  NG tube now out and diet is being advanced  Minimal UF with HD until oral intake improves a bit more  Potassium stable off supplements    Epogen 3 times weekly with dialysis for anemia    Immunotherapy per hematology/oncology, daily CBC  Await outpatient dialysis/rehab plans      Tai Antonio MD  [facility]   Office phone number: [phone]  Answering service phone number: [phone]    10/25/23  11:28 EDT    Dictation performed using Dragon dictation software

## 2023-10-25 NOTE — SIGNIFICANT NOTE
10/25/23 1623   Post Acute Pre-Cert Documentation   Request Submitted by Facility - Type: Hospital   Post-Acute Authorization Type Submitted: SNF   Date Post Acute Pre-Cert Inititated per Facility 10/25/23   Verification from Payer Yes   Date Post Acute Pre-Cert Completed 10/25/23   Accepting Facility Signature [facility] Discharge Date Requested 10/25/23   All Clinicals Submitted? Yes   Had Accepting Facility at Time of Submission Yes   Response Received from Insurance? Approval   Response Communicated to: ;Accepting Facility Liaison;Accepting Facility Auth Department   Authorization Number: [id number]   Post Acute Pre-Cert Initiated Comment Stella NAGEL

## 2023-10-25 NOTE — PLAN OF CARE
Goal Outcome Evaluation:  Plan of Care Reviewed With: patient           Outcome Evaluation: Pt seen for OT session this AM. Wanted to work on UE strengthening sitting up in bed this AM. Pt performed several UE yellow theraband exercises to work on strengthening and required rest breaks throughout. Pt very motivated. Stated she wanted to work on STS and LE strengthening with PT later today. Pt continues to benefit from skilled OT to address deficits and maximize independence with ADLs.      Anticipated Discharge Disposition (OT): skilled nursing facility

## 2023-10-25 NOTE — PROGRESS NOTES
Subjective     CHIEF COMPLAINT:     Metastatic breast cancer on Faslodex and abemaciclib  Severe aortic stenosis    INTERVAL HISTORY:     Patient reports further improvement in her abdominal distention.  Abdomen is softer and she is having less pain.    REVIEW OF SYSTEMS:  A comprehensive review of systems was obtained with pertinent positive findings as noted in the interval history above.  All other systems negative.    SCHEDULED MEDS:  Abemaciclib, 100 mg, Oral, BID  albumin human, 12.5 g, Intravenous, Once  atorvastatin, 40 mg, Oral, Nightly  diatrizoate meglumine-sodium, 30 mL, Oral, Once  epoetin jana/jana-epbx, 20,000 Units, Subcutaneous, Once per day on Mon Wed Fri  folic acid, 1 mg, Oral, Daily  gabapentin, 300 mg, Oral, Nightly  insulin lispro, 2-9 Units, Subcutaneous, 4x Daily With Meals & Nightly  levothyroxine, 50 mcg, Oral, Q AM  midodrine, 10 mg, Oral, TID AC  multivitamin, 1 tablet, Oral, Daily  mupirocin, 1 application , Topical, Daily  pantoprazole, 40 mg, Intravenous, Q AM  polyethylene glycol, 17 g, Oral, Daily  senna-docusate sodium, 2 tablet, Oral, BID  sertraline, 150 mg, Oral, Daily  sodium chloride, 10 mL, Intravenous, Q12H  vitamin B-12, 1,000 mcg, Oral, Daily      Objective   VITAL SIGNS:  Temp:  [96.6 °F (35.9 °C)-99.5 °F (37.5 °C)] 98.1 °F (36.7 °C)  Heart Rate:  [73-74] 73  Resp:  [16-18] 18  BP: (81-97)/(40-54) 81/46     PHYSICAL EXAMINATION:  GENERAL:  The patient appears in fair general condition, not in acute distress.  SKIN: No skin rash.   EYES:  No Jaundice. Pallor.   CHEST: Normal respiratory effort. Lungs clear to auscultation.    CARDIAC:  Normal S1 & S2. No murmur.   ABDOMEN: Some decrease in the abdominal distention.    RESULT REVIEW:   Results from last 7 days   Lab Units 10/25/23  0404 10/24/23  0412 10/23/23  0406 10/22/23  0401 10/21/23  0353   WBC 10*3/mm3 6.57 8.11 8.48 8.53 8.18   NEUTROS ABS 10*3/mm3 4.98 6.10 6.39 6.17 6.87   LYMPHS ABS 10*3/mm3  --   --   --   --   0.74   HEMOGLOBIN g/dL 7.4* 8.0* 8.9* 9.8* 8.9*   HEMATOCRIT % 23.4* 24.8* 26.8* 30.7* 28.0*   PLATELETS 10*3/mm3 155 189 192 185 163     Results from last 7 days   Lab Units 10/25/23  0404 10/24/23  0412 10/23/23  0405 10/22/23  0401 10/21/23  0353   SODIUM mmol/L 135* 132* 134* 135* 139   POTASSIUM mmol/L 4.0 4.3 4.0 4.3 4.0   CHLORIDE mmol/L 97* 94* 95* 96* 99   CO2 mmol/L 28.0 26.2 27.0 23.0 25.4   BUN mg/dL 23 42* 36* 26* 33*   CREATININE mg/dL 4.42* 6.73* 5.90* 4.67* 5.73*   CALCIUM mg/dL 8.3* 8.0* 8.8 8.7 8.2*   ALBUMIN g/dL  --   --   --  2.0*  --    BILIRUBIN mg/dL  --   --   --  0.5  --    ALK PHOS U/L  --   --   --  101  --    ALT (SGPT) U/L  --   --   --  12  --    AST (SGOT) U/L  --   --   --  18  --      Component      Latest Ref Rn 3/16/2023 10/16/2023   CA 15-3      <=25.0 U/mL 27.0 (H)  21.3        Assessment & Plan   *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  Patient follows with Dr. Irvin at our office.  PET scan on 7/3/2023 revealed metastatic disease to bone.  There was hypermetabolic ascites with soft tissue density in the anterior pelvis-possible peritoneal carcinomatosis.  Soft tissue thickening at the left renal pelvis.  Paracentesis on 10/9/2023-rare atypical cells suspicious for lobular carcinoma.  Was reported as ER/DE negative.  Patient is on combination regimen with Faslodex monthly and Verzenio 100 mg twice daily.  She was going to have follow-up PET scan on 10/23/2023.    *Ileus versus small bowel obstruction.  Patient had NG tube placed.  CT revealed thickening of the antrum of the stomach.  She was started on Protonix.  NG was removed on 10/23/2023.  No recurrence of the symptoms after removal of the NG tube.  GI symptoms are improving.    *Anemia due to end-stage renal failure.  She is on hemodialysis.  She has anemia secondary to end-stage renal disease.  She receives Retacrit 3 days a week.    10/24/2023: Hemoglobin 8.0.  10/25/2023: Hemoglobin decreased to  7.4.    PLAN:    1.  Since patient is going to be discharged soon, we will transfuse 1 unit of PRBCs.   2.  Patient missed a PET scan that was scheduled on 10/23/2023.  This will be rescheduled after discharge and she will see Dr. Irvin afterwards to review the scan.  3.  I discussed the case with Dr. Irvin.  The concern is that the development of the peritoneal carcinomatosis is indicative of disease progression on raising you and Faslodex and he is considering changing treatment, possibly changing to chemotherapy with Taxol.        Bogdan Perez MD  10/25/23

## 2023-10-25 NOTE — PROGRESS NOTES
Name: Juana Hall ADMIT: 10/13/2023   : 1960  PCP: Kiana Noriega (BETY Sanders    MRN: [id number] LOS: 11 days   AGE/SEX: 63 y.o. female  ROOM: [facility]     Subjective   Subjective   Resting in bed.  2 family members at bedside.  She denies any new issues today.  Denies any nausea, vomiting or abdominal pain.  Still passing liquid stool. Denies any chest pain or trouble breathing.  She states she is tolerating a clear liquid diet and has not been advanced yet.     Objective   Objective   Vital Signs  Temp:  [97.5 °F (36.4 °C)-99.5 °F (37.5 °C)] 98.1 °F (36.7 °C)  Heart Rate:  [73-74] 73  Resp:  [18] 18  BP: (81-88)/(46-54) 81/46  SpO2:  [93 %-94 %] 94 %  on   ;   Device (Oxygen Therapy): room air  Body mass index is 56.07 kg/m².    Physical Exam  Vitals and nursing note reviewed.   Constitutional:       Appearance: She is obese. She is ill-appearing (chronically). She is not toxic-appearing.   Cardiovascular:      Rate and Rhythm: Normal rate.      Pulses: Normal pulses.      Heart sounds: Murmur heard.   Pulmonary:      Effort: Pulmonary effort is normal. No respiratory distress.      Comments: Diminished d/t body habitus  Abdominal:      General: Bowel sounds are normal.      Palpations: Abdomen is soft.      Tenderness: There is no abdominal tenderness.   Musculoskeletal:         General: Swelling present. Normal range of motion.   Skin:     General: Skin is warm and dry.   Neurological:      General: No focal deficit present.      Mental Status: She is alert and oriented to person, place, and time.      Motor: Weakness present.   Psychiatric:         Mood and Affect: Mood normal.         Behavior: Behavior normal.     Results Review:       I reviewed the patient's new clinical results.  Results from last 7 days   Lab Units 10/25/23  0404 10/24/23  0412 10/23/23  0406 10/22/23  0401   WBC 10*3/mm3 6.57 8.11 8.48 8.53   HEMOGLOBIN g/dL 7.4* 8.0* 8.9* 9.8*   PLATELETS 10*3/mm3 155 189 192 185      Results from last 7 days   Lab Units 10/25/23  0404 10/24/23  0412 10/23/23  0405 10/22/23  0401   SODIUM mmol/L 135* 132* 134* 135*   POTASSIUM mmol/L 4.0 4.3 4.0 4.3   CHLORIDE mmol/L 97* 94* 95* 96*   CO2 mmol/L 28.0 26.2 27.0 23.0   BUN mg/dL 23 42* 36* 26*   CREATININE mg/dL 4.42* 6.73* 5.90* 4.67*   GLUCOSE mg/dL 191* 179* 150* 139*   Estimated Creatinine Clearance: 20.4 mL/min (A) (by C-G formula based on SCr of 4.42 mg/dL (H)).  Results from last 7 days   Lab Units 10/22/23  0401   ALBUMIN g/dL 2.0*   BILIRUBIN mg/dL 0.5   ALK PHOS U/L 101   AST (SGOT) U/L 18   ALT (SGPT) U/L 12     Results from last 7 days   Lab Units 10/25/23  0404 10/24/23  0412 10/23/23  0405 10/22/23  0401   CALCIUM mg/dL 8.3* 8.0* 8.8 8.7   ALBUMIN g/dL  --   --   --  2.0*       Glucose   Date/Time Value Ref Range Status   10/25/2023 1211 177 (H) 70 - 130 mg/dL Final   10/25/2023 0759 219 (H) 70 - 130 mg/dL Final   10/24/2023 2108 239 (H) 70 - 130 mg/dL Final   10/24/2023 1628 179 (H) 70 - 130 mg/dL Final   10/24/2023 0800 178 (H) 70 - 130 mg/dL Final   10/23/2023 2051 212 (H) 70 - 130 mg/dL Final   10/23/2023 1634 206 (H) 70 - 130 mg/dL Final       Abemaciclib, 100 mg, Oral, BID  albumin human, 12.5 g, Intravenous, Once  atorvastatin, 40 mg, Oral, Nightly  diatrizoate meglumine-sodium, 30 mL, Oral, Once  epoetin jana/jana-epbx, 20,000 Units, Subcutaneous, Once per day on Mon Wed Fri  folic acid, 1 mg, Oral, Daily  gabapentin, 300 mg, Oral, Nightly  insulin lispro, 2-9 Units, Subcutaneous, 4x Daily With Meals & Nightly  levothyroxine, 50 mcg, Oral, Q AM  midodrine, 10 mg, Oral, TID AC  multivitamin, 1 tablet, Oral, Daily  mupirocin, 1 application , Topical, Daily  pantoprazole, 40 mg, Intravenous, Q AM  polyethylene glycol, 17 g, Oral, Daily  senna-docusate sodium, 2 tablet, Oral, BID  sertraline, 150 mg, Oral, Daily  sodium chloride, 10 mL, Intravenous, Q12H  vitamin B-12, 1,000 mcg, Oral, Daily       Diet: Gastrointestinal Diets,  Cardiac Diets, Diabetic Diets, Renal Diets; Healthy Heart (2-3 Na+); Consistent Carbohydrate; Low Sodium (2-3g), Low Potassium, Low Phosphorus; Fiber-Restricted, Low Irritant; Texture: Regular Texture (IDDSI 7); Fluid...       Assessment/Plan     Active Hospital Problems    Diagnosis  POA   • **Hypotension [I95.9]  Yes   • Abnormal urinalysis [R82.90]  Unknown   • Hypokalemia [E87.6]  Unknown   • Diastolic CHF, chronic [I50.32]  Yes   • ANDREW (obstructive sleep apnea) [G47.33]  Yes   • Combined systolic and diastolic congestive heart failure [I50.40]  Yes   • ESRD (end stage renal disease) [N18.6]  Yes   • Morbid obesity with BMI of 50.0-59.9, adult [E66.01, Z68.43]  Not Applicable   • Anemia in stage 3 chronic kidney disease [N18.30, D63.1]  Yes   • Primary malignant neoplasm of breast with metastasis [C50.919]  Yes   • Acquired hypothyroidism [E03.9]  Yes   • Diabetes type 2, controlled [E11.9]  Yes      Resolved Hospital Problems   No resolved problems to display.     Ms. Hall is a 63 y.o. that initially presented to the hospital on 10/13/2023 for weakness, dizziness and multiple falls. She had a brief recent hospitalization at the end of September where she presented with nausea and vomiting found to have a large amount of ascites.  She ultimately underwent ultrasound-guided paracentesis on 10/9 with 11 L of fluid removed precipitated her symptoms.  She was advised by Dr. Irvin her oncologist to go to the emergency room.  She typically does home hemodialysis at baseline was noted to be hypotensive on arrival.  She did require transfer to the ICU for vasopressors. She was found to have pneumonia and a UTI as well. Her course has been complicated by an ileus versus small bowel obstruction.      Hypotension-multifactorial due to infection, large-volume paracentesis, HD, aortic valve stenosis.  On midodrine- nephrology increasing. Improving.  Pneumonia/UTI-completed course of antibiotics while in the ICU.  Ileus  versus small bowel obstruction-NG removed 10/23. Tolerating CLD with return of bowel function. Surgery advanced diet 10/24- will place order. Needs GI soft diet indefinitely.   ESRD-on home hemodialysis 5 days a week at home.  Nephrology managing here on T/Th/Sat schedule.  Type 2 diabetes-glucose at goal on SSI  Aortic valve stenosis-not a candidate for intervention  Chronic diastolic heart failure-volume managed with dialysis  Paroxysmal A-fib-blood pressure too low to tolerate beta-blockade.  Not on anticoagulation due to anemia.  Anemia of CKD-on EPO injections. Hemoglobin 7.4 today- defer to hematology if we should transfuse.  Hypothyroidism-Synthroid  Stage IV breast cancer with malignant ascites-on abemaciclib and faslodex as an outpatient  Thickened appearance of gastric antrum on CT-on PPI. May eventually need outpatient EGD.  ANDREW-PAP if available     Discussed with patient, family and nursing staff.    Precert initiated today- possible discharge to rehab tomorrow if okay with all.     Palliative consulted this admission and patient not interested.     VTE Prophylaxis - SCDs  Code Status - Full code  Disposition - Anticipate discharge to SNF as above.    BETY Irving  [facility]  10/25/23  13:44 EDT

## 2023-10-25 NOTE — PLAN OF CARE
Goal Outcome Evaluation:              Outcome Evaluation: VSS. RESTED WELL IN BETWEEN CARE. NO C/O'S VOICED. UNEVENTFUL NIGHT.

## 2023-10-25 NOTE — PLAN OF CARE
Goal Outcome Evaluation:  Plan of Care Reviewed With: patient        Progress: no change  Outcome Evaluation: Pt resting in bed upon PT arrival - initially declined services due to  being in ER but she reluctantly agreed to participate. Pt req mod A of 2 to sit EOB and dem fair sitting balance at EOB. Pt attempted to stand from raised EOB with max A of 2 - unable to fully straighten knees or stand fully upright. When pt returned to EOB, she was very close to EOB on slippery mattress. Pt was dependent x 3 to return supine in bed safely. She did attempt to use UE with trapeze bar to help reposition but she was still dependent to reposition. Poor tolerance of therapy today but pt was distracted by phone calls regariding her  being in ER. Cont PT to address functional deficits and prepare for d/c.      Anticipated Discharge Disposition (PT): skilled nursing facility

## 2023-10-25 NOTE — THERAPY TREATMENT NOTE
Patient Name: Juana Hall  : 1960    MRN: [id number]                              Today's Date: 10/25/2023       Admit Date: 10/13/2023    Visit Dx:     ICD-10-CM ICD-9-CM   1. Hypotension, unspecified hypotension type  I95.9 458.9   2. Acute UTI  N39.0 599.0   3. End-stage renal disease on hemodialysis  N18.6 585.6    Z99.2 V45.11   4. Other fracture of left foot, initial encounter for closed fracture  S92.812A 825.20   5. Anemia in stage 3 chronic kidney disease, unspecified whether stage 3a or 3b CKD  N18.30 285.21    D63.1 585.3     Patient Active Problem List   Diagnosis    Anxiety    Depression    Diabetes type 2, controlled    Hyperlipidemia    Heart murmur    Hypertension    Post-traumatic osteoarthritis of multiple joints    Psoriasis    Radiculopathy    Acquired hypothyroidism    Peripheral neuropathy    Normocytic anemia    History of right breast cancer    Omental mass    Primary malignant neoplasm of breast with metastasis    Elevated serum creatinine    Iron deficiency anemia    Anemia in stage 3 chronic kidney disease    Stage 3b chronic kidney disease    Anxiety and depression    RYAN (acute kidney injury)    Anemia, chronic disease    Diastolic CHF, chronic    Frequent UTI    Metabolic acidosis    Severe malnutrition    Hydronephrosis    Generalized weakness    COVID-19 virus detected    Hypocalcemia    Morbid obesity with BMI of 50.0-59.9, adult    ESRD (end stage renal disease)    Pancytopenia due to chemotherapy    Chronic anemia    Rectal bleeding    Bicuspid aortic valve    Complicated UTI (urinary tract infection)    Acute UTI (urinary tract infection)    Severe aortic stenosis    Combined systolic and diastolic congestive heart failure    Depression    ANDREW (obstructive sleep apnea)    Diastolic CHF, chronic    Adverse effect of chemotherapy    UTI (urinary tract infection)    Hematuria    Dysuria    Intractable vomiting    Hypotension    Abnormal urinalysis    Hypokalemia     Past  Medical History:   Diagnosis Date    Anemia     Anxiety and depression     Bicuspid aortic valve     had surgery    Breast cancer 2004    RIGHT, HAD NEELA. 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    History of sepsis 2010    KIDNEY STONE    History of transfusion     no reaction    Hyperlipidemia     Hyperthyroidism     Hypothyroidism     Kidney stone     CURRENT LEFT-DEC 2021    Lymphedema of leg     LEFT    Metastasis from breast cancer 2019    STOMACH-OMENTUM    Neuromuscular disorder     Obesity     ANDREW on CPAP     CPAP    Osteoarthrosis     Peripheral neuropathy     FEET BILAT    Radiculopathy     Rectal bleeding 08/16/2022    Thickened endometrium     Urinary incontinence     wears pads    Weakness     BILAT LEGS-WITH ANY AMT OF TIME     Past Surgical History:   Procedure Laterality Date    AORTIC VALVULOPLASTY  01/2023    ARTERIOVENOUS FISTULA/SHUNT SURGERY Left 11/03/2021    Procedure: LEFT  BRACHIOCEPALIC ARTERIOVENOUS FISTULA;  Surgeon: Dejuan Adler MD;  Location: [facility] MAIN OR;  Service: Vascular;  Laterality: Left;    BREAST RECONSTRUCTION  2004    WITH IMPLANTS, AND REVISION, NOW REMOVED    BREAST SURGERY Bilateral 2004    breast implants removed and then replaced due to infection    CARDIAC CATHETERIZATION N/A 08/23/2022    Procedure: CORONARY ANGIOGRAPHY;  Surgeon: Luis Rivers MD;  Location: [facility] CATH INVASIVE LOCATION;  Service: Cardiology;  Laterality:  N/A;    CARDIAC CATHETERIZATION N/A 2022    Procedure: Right Heart Cath;  Surgeon: Luis Rivers MD;  Location: [facility] CATH INVASIVE LOCATION;  Service: Cardiology;  Laterality: N/A;    CARDIAC CATHETERIZATION N/A 01/10/2023    Procedure: Valvuloplasty;  Surgeon: Luis Rivers MD;  Location[facility] C[facility]VASIVE LOC[facility];  Service: Cardiovascular;  Laterality: N/A;  01/10/2023    CARDIAC CATHETERIZATION N/A 01/10/2023    Procedure: Aortic root aortogram;  Surgeon: Luis Rivers MD;  [facility];  Service: Cardiovascular;  Laterality: N/A;    CATARACT EXTRACTION WITH INTRAOCULAR LENS IMPLANT Bilateral      SECTION  1987     SECTION  1987    CYSTOSCOPY W/ URETERAL STENT PLACEMENT      CYSTOSCOPY W/ URETERAL STENT PLACEMENT Left 2021    Procedure: CYSTOSCOPY KEFT RETROGRADE PYELOGRAM  LEFT  URETERAL STENT PLACEMENT;  Surgeon: Leodan Mckee Jr., MD;  Location: [facility] OR;  Service: Urology;  Laterality: Left;    CYSTOSCOPY W/ URETERAL STENT PLACEMENT Left 03/10/2022    Procedure: CYSTOSCOPY AND LEFT URETERAL STENT EXCHANGE, RETROGRADE PYELOGRAM;  Surgeon: Leodan Mckee Jr., MD;  Location: [facility] OR;  Service: Urology;  Laterality: Left;    CYSTOSCOPY W/ URETERAL STENT PLACEMENT Left 2023    Procedure: CYSTOSCOPY WITH LEFT URETERAL STENT PLACEMENT, RETROGRADE PYELOGRAM, CLOT EVACUATION, BLADDER FULGARATION;  Surgeon: Leodan Mckee Jr., MD;  Location: [facility] MAIN OR;  Service: Urology;  Laterality: Left;    D & C HYSTEROSCOPY N/A 2017    Procedure: DILATATION AND CURETTAGE, HYSTEROSCOPY ;  Surgeon: Cherie Oliveros MD;  Location: [facility] OR OSC;  Service:     D & C HYSTEROSCOPY N/A 2019    Procedure: DILATATION AND CURETTAGE HYSTEROSCOPY/POLYPECTOMY;  Surgeon: Cherie Oliveros MD;  [facility];  Service: Gynecology    D & C HYSTEROSCOPY ENDOMETRIAL ABLATION N/A  5/5/2023    Procedure: DILATATION AND CURETTAGE;  Surgeon: Ina Marcos MD;  Location: [facility] OR;  Service: Obstetrics/Gynecology;  Laterality: N/A;    ENDOSCOPY      INSERTION AND REMOVAL HEMODIALYSIS CATHETER Right 05/01/2021    INSERTION HEMODIALYSIS CATHETER Right 05/01/2021    Procedure: HEMODIALYSIS CATHETER INSERTION;  Surgeon: Clint Hernández MD;  Location: [facility] OR;  Service: Vascular;  Laterality: Right;    LYMPH NODE BIOPSY      MASTECTOMY Bilateral 2004    VENOUS ACCESS DEVICE (PORT) INSERTION AND REMOVAL        General Information       Row Name 10/25/23 43          OT Time and Intention    Document Type therapy note (daily note)  -     Mode of Treatment individual therapy;occupational therapy  -       Row Name 10/25/23 0943          General Information    Patient Profile Reviewed yes  -     Existing Precautions/Restrictions fall  -       Row Name 10/25/23 0943          Cognition    Orientation Status (Cognition) oriented x 4  -       Row Name 10/25/23 0943          Safety Issues, Functional Mobility    Impairments Affecting Function (Mobility) balance;coordination;endurance/activity tolerance;motor control;strength  -               User Key  (r) = Recorded By, (t) = Taken By, (c) = Cosigned By      Initials Name Provider Type     Glenis Jones OT Occupational Therapist                     Mobility/ADL's       Row Name 10/25/23 0943          Bed Mobility    Scooting/Bridging [place] (Bed Mobility) --  Pt used trapeze bar above bed to assist with scooting self to HOB with LE's raised and HOB flat. Max Ax1 with assist of pt pushing through LE's to scoot self towards HOB  -       Row Name 10/25/23 0943          Sit-Stand Transfer    Comment, (Sit-Stand Transfer) --  Pt wanted to work on UE strengthening with OT this AM and work with PT on STS later  -       Row Name 10/25/23 0943          Functional Mobility    Functional Mobility- Ind. Level unable to perform   -       Row Name 10/25/23 0943          Lower Body Dressing Assessment/Training    [place] Level (Lower Body Dressing) socks;don;dependent (less than 25% patient effort)  -     Position (Lower Body Dressing) supine  -               User Key  (r) = Recorded By, (t) = Taken By, (c) = Cosigned By      Initials Name Provider Type    Glenis Laureano OT Occupational Therapist                   Obj/Interventions       Row Name 10/25/23 0946          Shoulder (Therapeutic Exercise)    Shoulder (Therapeutic Exercise) strengthening exercise  -     Shoulder Strengthening (Therapeutic Exercise) bilateral;flexion;extension;aBduction;aDduction;horizontal aBduction/aDduction;external rotation;internal rotation;resisted diagonal exercise;yellow;resistance band;3 sets;10 repetitions  Seated up in bed  -       Row Name 10/25/23 0946          Elbow/Forearm (Therapeutic Exercise)    Elbow/Forearm (Therapeutic Exercise) strengthening exercise  -     Elbow/Forearm Strengthening (Therapeutic Exercise) bilateral;flexion;extension;supination;pronation;yellow;resistance band;10 repetitions;3 sets  seated up in bed  -               User Key  (r) = Recorded By, (t) = Taken By, (c) = Cosigned By      Initials Name Provider Type    Gleins Laureano OT Occupational Therapist                   Goals/Plan    No documentation.                  Clinical Impression       Row Name 10/25/23 0947          Pain Assessment    Pretreatment Pain Rating 0/10 - no pain  -     Posttreatment Pain Rating 0/10 - no pain  -       Row Name 10/25/23 0947          Plan of Care Review    Plan of Care Reviewed With patient  -     Outcome Evaluation Pt seen for OT session this AM. Wanted to work on UE strengthening sitting up in bed this AM. Pt performed several UE yellow theraband exercises to work on strengthening and required rest breaks throughout. Pt very motivated. Stated she wanted to work on STS and LE strengthening with PT later  today. Pt continues to benefit from skilled OT to address deficits and maximize independence with ADLs.  -       Row Name 10/25/23 0947          Therapy Plan Review/Discharge Plan (OT)    Anticipated Discharge Disposition (OT) [facility] nursing [facility]  -       Row Name 10/25/23 0947          Vital Signs    Pre Patient Position Supine  -KA     Intra Patient Position Supine  -KA     Post Patient Position Supine  -       Row Name 10/25/23 0947          Positioning and Restraints    Pre-Treatment Position in bed  -KA     Post Treatment Position bed  -KA     In Bed supine;notified nsg;call light within reach;encouraged to call for assist;exit alarm on;fowlers  -               User Key  (r) = Recorded By, (t) = Taken By, (c) = Cosigned By      Initials Name Provider Type    Glenis Laureano OT Occupational Therapist                   Outcome Measures       Row Name 10/25/23 0949          How much help from another is currently needed...    Putting on and taking off regular lower body clothing? 1  -KA     Bathing (including washing, rinsing, and drying) 2  -KA     Toileting (which includes using toilet bed pan or urinal) 1  -KA     Putting on and taking off regular upper body clothing 2  -KA     Taking care of personal grooming (such as brushing teeth) 3  -KA     Eating meals 3  -KA     AM-PAC 6 Clicks Score (OT) 12  -KA       Row Name 10/25/23 0949          Functional Assessment    Outcome Measure Options AM-PAC 6 Clicks Daily Activity (OT)  -               User Key  (r) = Recorded By, (t) = Taken By, (c) = Cosigned By      Initials Name Provider Type    Glenis Laureano OT Occupational Therapist                    Occupational Therapy Education       Title: PT OT SLP Therapies (Done)       Topic: Occupational Therapy (Done)       Point: Home exercise program (Done)       Description:   Instruct learner(s) on appropriate technique for monitoring, assisting and/or progressing therapeutic  exercises/activities.                  Learning Progress Summary             Patient DEMAR Villatoro, VU by MEE at 10/15/2023 1152    Comment: ed on HEP to do in bed                                         User Key       Initials Effective Dates Name Provider Type Discipline    MEE 01/03/23 -  Kely Manjarrez OT Occupational Therapist OT                  OT Recommendation and Plan     Plan of Care Review  Plan of Care Reviewed With: patient  Outcome Evaluation: Pt seen for OT session this AM. Wanted to work on UE strengthening sitting up in bed this AM. Pt performed several UE yellow theraband exercises to work on strengthening and required rest breaks throughout. Pt very motivated. Stated she wanted to work on STS and LE strengthening with PT later today. Pt continues to benefit from skilled OT to address deficits and maximize independence with ADLs.     Time Calculation:         Time Calculation- OT       Row Name 10/25/23 0949             Time Calculation- OT    OT Start Time 0905  -KA      OT Stop Time 0943  -KA      OT Time Calculation (min) 38 min  -KA      Total Timed Code Minutes- OT 38 minute(s)  -KA      OT Received On 10/25/23  -KA      OT - Next Appointment 10/26/23  -KA         Timed Charges    40845 - OT Therapeutic Exercise Minutes 38  -KA         Total Minutes    Timed Charges Total Minutes 38  -KA       Total Minutes 38  -KA                User Key  (r) = Recorded By, (t) = Taken By, (c) = Cosigned By      Initials Name Provider Type    Glenis Laureano OT Occupational Therapist                  Therapy Charges for Today       Code Description Service Date Service Provider Modifiers Qty    [id number] HC OT THER PROC EA 15 MIN 10/25/2023 Glenis Jones OT GO 3                 Glenis Jones OT  10/25/2023

## 2023-10-26 VITALS
DIASTOLIC BLOOD PRESSURE: 52 MMHG | HEART RATE: 69 BPM | BODY MASS INDEX: 45.99 KG/M2 | OXYGEN SATURATION: 95 % | SYSTOLIC BLOOD PRESSURE: 100 MMHG | TEMPERATURE: 98.4 F | HEIGHT: 67 IN | RESPIRATION RATE: 18 BRPM | WEIGHT: 293 LBS

## 2023-10-26 PROBLEM — I50.32 CHRONIC HEART FAILURE WITH PRESERVED EJECTION FRACTION (HFPEF): Status: ACTIVE | Noted: 2023-01-01

## 2023-10-26 PROBLEM — E87.6 HYPOKALEMIA: Status: RESOLVED | Noted: 2023-10-13 | Resolved: 2023-10-26

## 2023-10-26 LAB
ANION GAP SERPL CALCULATED.3IONS-SCNC: 6.6 MMOL/L (ref 5–15)
BASOPHILS # BLD AUTO: 0.03 10*3/MM3 (ref 0–0.2)
BASOPHILS NFR BLD AUTO: 0.4 % (ref 0–1.5)
BUN SERPL-MCNC: 29 MG/DL (ref 8–23)
BUN/CREAT SERPL: 5.2 (ref 7–25)
CALCIUM SPEC-SCNC: 8.2 MG/DL (ref 8.6–10.5)
CHLORIDE SERPL-SCNC: 99 MMOL/L (ref 98–107)
CO2 SERPL-SCNC: 28.4 MMOL/L (ref 22–29)
CREAT SERPL-MCNC: 5.56 MG/DL (ref 0.57–1)
DEPRECATED RDW RBC AUTO: 57.8 FL (ref 37–54)
EGFRCR SERPLBLD CKD-EPI 2021: 8 ML/MIN/1.73
EOSINOPHIL # BLD AUTO: 0.2 10*3/MM3 (ref 0–0.4)
EOSINOPHIL NFR BLD AUTO: 2.7 % (ref 0.3–6.2)
ERYTHROCYTE [DISTWIDTH] IN BLOOD BY AUTOMATED COUNT: 16.6 % (ref 12.3–15.4)
GLUCOSE BLDC GLUCOMTR-MCNC: 126 MG/DL (ref 70–130)
GLUCOSE BLDC GLUCOMTR-MCNC: 178 MG/DL (ref 70–130)
GLUCOSE BLDC GLUCOMTR-MCNC: 179 MG/DL (ref 70–130)
GLUCOSE SERPL-MCNC: 178 MG/DL (ref 65–99)
HCT VFR BLD AUTO: 26.6 % (ref 34–46.6)
HGB BLD-MCNC: 8.5 G/DL (ref 12–15.9)
IMM GRANULOCYTES # BLD AUTO: 0.14 10*3/MM3 (ref 0–0.05)
IMM GRANULOCYTES NFR BLD AUTO: 1.9 % (ref 0–0.5)
LYMPHOCYTES # BLD AUTO: 0.81 10*3/MM3 (ref 0.7–3.1)
LYMPHOCYTES NFR BLD AUTO: 10.8 % (ref 19.6–45.3)
MCH RBC QN AUTO: 31 PG (ref 26.6–33)
MCHC RBC AUTO-ENTMCNC: 32 G/DL (ref 31.5–35.7)
MCV RBC AUTO: 97.1 FL (ref 79–97)
MONOCYTES # BLD AUTO: 0.74 10*3/MM3 (ref 0.1–0.9)
MONOCYTES NFR BLD AUTO: 9.8 % (ref 5–12)
NEUTROPHILS NFR BLD AUTO: 5.61 10*3/MM3 (ref 1.7–7)
NEUTROPHILS NFR BLD AUTO: 74.4 % (ref 42.7–76)
NRBC BLD AUTO-RTO: 0.1 /100 WBC (ref 0–0.2)
PLATELET # BLD AUTO: 173 10*3/MM3 (ref 140–450)
PMV BLD AUTO: 10.4 FL (ref 6–12)
POTASSIUM SERPL-SCNC: 4 MMOL/L (ref 3.5–5.2)
RBC # BLD AUTO: 2.74 10*6/MM3 (ref 3.77–5.28)
SODIUM SERPL-SCNC: 134 MMOL/L (ref 136–145)
WBC NRBC COR # BLD: 7.53 10*3/MM3 (ref 3.4–10.8)

## 2023-10-26 PROCEDURE — 85025 COMPLETE CBC W/AUTO DIFF WBC: CPT | Performed by: HOSPITALIST

## 2023-10-26 PROCEDURE — 82948 REAGENT STRIP/BLOOD GLUCOSE: CPT

## 2023-10-26 PROCEDURE — 25810000003 SODIUM CHLORIDE 0.9 % SOLUTION: Performed by: INTERNAL MEDICINE

## 2023-10-26 PROCEDURE — 80048 BASIC METABOLIC PNL TOTAL CA: CPT | Performed by: INTERNAL MEDICINE

## 2023-10-26 PROCEDURE — 63710000001 INSULIN LISPRO (HUMAN) PER 5 UNITS: Performed by: STUDENT IN AN ORGANIZED HEALTH CARE EDUCATION/TRAINING PROGRAM

## 2023-10-26 RX ORDER — MIDODRINE HYDROCHLORIDE 10 MG/1
10 TABLET ORAL
Start: 2023-10-26

## 2023-10-26 RX ORDER — GABAPENTIN 300 MG/1
300 CAPSULE ORAL NIGHTLY
Qty: 3 CAPSULE | Refills: 0 | Status: SHIPPED | OUTPATIENT
Start: 2023-10-26

## 2023-10-26 RX ORDER — AMOXICILLIN 250 MG
2 CAPSULE ORAL 2 TIMES DAILY
Start: 2023-10-26

## 2023-10-26 RX ORDER — DIPHENOXYLATE HYDROCHLORIDE AND ATROPINE SULFATE 2.5; .025 MG/1; MG/1
1 TABLET ORAL DAILY
Start: 2023-10-27

## 2023-10-26 RX ORDER — INSULIN LISPRO 100 [IU]/ML
2-9 INJECTION, SOLUTION INTRAVENOUS; SUBCUTANEOUS
Start: 2023-10-26

## 2023-10-26 RX ORDER — PANTOPRAZOLE SODIUM 40 MG/1
40 TABLET, DELAYED RELEASE ORAL DAILY
Start: 2023-10-26

## 2023-10-26 RX ORDER — HYDROCODONE BITARTRATE AND ACETAMINOPHEN 5; 325 MG/1; MG/1
1 TABLET ORAL EVERY 4 HOURS PRN
Qty: 18 TABLET | Refills: 0 | Status: SHIPPED | OUTPATIENT
Start: 2023-10-26

## 2023-10-26 RX ORDER — POLYETHYLENE GLYCOL 3350 17 G/17G
17 POWDER, FOR SOLUTION ORAL DAILY
Start: 2023-10-27

## 2023-10-26 RX ADMIN — FOLIC ACID 1 MG: 1 TABLET ORAL at 10:03

## 2023-10-26 RX ADMIN — MIDODRINE HYDROCHLORIDE 10 MG: 5 TABLET ORAL at 18:30

## 2023-10-26 RX ADMIN — MUPIROCIN 1 APPLICATION: 20 OINTMENT TOPICAL at 10:08

## 2023-10-26 RX ADMIN — LEVOTHYROXINE SODIUM 50 MCG: 50 TABLET ORAL at 06:46

## 2023-10-26 RX ADMIN — SERTRALINE 150 MG: 100 TABLET, FILM COATED ORAL at 10:03

## 2023-10-26 RX ADMIN — Medication 1 TABLET: at 10:03

## 2023-10-26 RX ADMIN — Medication 10 ML: at 10:04

## 2023-10-26 RX ADMIN — Medication 1000 MCG: at 10:03

## 2023-10-26 RX ADMIN — SODIUM CHLORIDE 500 ML: 9 INJECTION, SOLUTION INTRAVENOUS at 09:11

## 2023-10-26 RX ADMIN — PANTOPRAZOLE SODIUM 40 MG: 40 INJECTION, POWDER, FOR SOLUTION INTRAVENOUS at 06:47

## 2023-10-26 RX ADMIN — INSULIN LISPRO 2 UNITS: 100 INJECTION, SOLUTION INTRAVENOUS; SUBCUTANEOUS at 10:03

## 2023-10-26 RX ADMIN — MIDODRINE HYDROCHLORIDE 10 MG: 5 TABLET ORAL at 06:46

## 2023-10-26 NOTE — DISCHARGE SUMMARY
[facility]    Date of Admission: 10/13/2023  Date of Discharge:  10/26/2023    PCP: Kiana Noriega), BETY    Discharge Diagnosis:   Active Hospital Problems    Diagnosis  POA    **Hypotension [I95.9]  Yes    Chronic heart failure with preserved ejection fraction (HFpEF) [I50.32]  Yes    Abnormal urinalysis [R82.90]  Yes    Diastolic CHF, chronic [I50.32]  Yes    ANDREW (obstructive sleep apnea) [G47.33]  Yes    Combined systolic and diastolic congestive heart failure [I50.40]  Yes    ESRD (end stage renal disease) [N18.6]  Yes    Morbid obesity with BMI of 50.0-59.9, adult [E66.01, Z68.43]  Not Applicable    Anemia in stage 3 chronic kidney disease [N18.30, D63.1]  Yes    Primary malignant neoplasm of breast with metastasis [C50.919]  Yes    Acquired hypothyroidism [E03.9]  Yes    Diabetes type 2, controlled [E11.9]  Yes      Resolved Hospital Problems    Diagnosis Date Resolved POA    Hypokalemia [E87.6] 10/26/2023 Yes     Procedures Performed  none     Consults  Inpatient consult to nephrology  Inpatient consult to pulmonology  Inpatient consult to cardiology   Inpatient consult to hematology/oncology  Inpatient consult to general surgery    Hospital Course  Please see history and physical for details. Patient is a 63 y.o. female that initially presented to the hospital on 10/13/2023 for weakness, dizziness and multiple falls. She had a brief recent hospitalization at the end of September where she presented with nausea and vomiting found to have a large amount of ascites.  She ultimately underwent ultrasound-guided paracentesis on 10/9 with 11 L of fluid removed precipitated her symptoms.  She was advised by Dr. Irvin her oncologist (known history of breast cancer on therapy) to go to the emergency room.  She typically does home hemodialysis at baseline was noted to be hypotensive on arrival.  She did require transfer to the ICU for vasopressors. She was  found to have pneumonia and a UTI as well. Her course has been complicated by an ileus versus small bowel obstruction.    Hypotension was felt to be multifactorial due to infection, large volume paracentesis, hemodialysis as well as aortic valve stenosis.  Midodrine was initiated and increased to 10 mg 3 times daily per nephrology.  She has had some episodes of hypotension that improved with medication and are asymptomatic.  Her last pressure was in the low 100s systolic.  She has been receiving albumin as needed with hemodialysis.  Nephrology managed her hemodialysis during this stay as she is typically on home dialysis 5 days a week.  She was placed on a Tuesday, Thursday, Saturday schedule while admitted and will continue this at rehab.  She has been getting Epogen for her anemia of chronic disease.  Hemoglobin on 10/25 was 7.4 and hematology oncology recommended transfusion.  They did follow given her history of breast cancer with recent peritoneal carcinomatosis.  She was maintained on her Verzenio and will resume Faslodex in the outpatient setting at the direction of her oncologist.  Dr. Irvin does feel that she may need alteration of her therapy given progression.  She was due for a PET scan which will be rescheduled.  She should see Dr. Irvin in the next couple weeks for ongoing recommendations.   As above, she did complete a course of antibiotics for pneumonia/UTI.  She developed some issues with nausea and vomiting as well as abdominal pain showing ileus versus small bowel obstruction.  NG tube was placed and eventually removed 10/23.  General surgery was consulted and conservative measures taken.  She was put on a clear liquid diet and diet advanced 10/24.  She will need to remain on a GI soft diet indefinitely.  She has been moving her bowels and tolerating a diet with no issues at this time.   She does have known DM 2 and previously on Levemir prior to admission.  She was placed on sliding scale insulin  here with modest sugars.  Her long-acting insulin can be resumed in the outpatient setting based on her trends, but would continue correctional sliding scale insulin for now and defer ongoing care to her rehab provider.   Given her A-fib as well as severe aortic valve stenosis and chronic diastolic heart failure, cardiology was consulted.  Her blood pressure was too low to tolerate any beta-blockade and she was not on any anticoagulation secondary to her anemia.  Unfortunately given her other issues, she has not felt to be a candidate for any intervention for her aortic valve stenosis.  Her volume status was managed with hemodialysis and ultimately palliative care was recommended for goals of care discussion given long-term poor prognosis.  She was not interested in any sort of palliation and remains a full code at this time.   She was noted to have some thickened appearance of the gastric antrum on CT and will remain on PPI.  She may eventually need outpatient EGD if felt necessary by her oncologist or any recurrence of GI symptoms.   On the day of discharge, she denies any chest pain, dyspnea, cough, fever or chills.  She denies any nausea, vomiting or abdominal pain and is feeling much improved.  She is eager to go to rehab but she wants to start getting more mobile in order to return home.  She should follow-up with nephrology as advised as well as cardiology and hematology.  Would recommend she see her PCP in the next 1 to 2 weeks for ongoing care.    I discussed the patient's findings and my recommendations with patient, nursing staff, and Dr. Rose .    Condition on Discharge: Improved.     Temp:  [97.7 °F (36.5 °C)-99 °F (37.2 °C)] 98.4 °F (36.9 °C)  Heart Rate:  [68-77] 69  Resp:  [18] 18  BP: ()/(41-52) 100/52  Body mass index is 56.07 kg/m².    Physical Exam  Vitals and nursing note reviewed.   Constitutional:       Appearance: She is obese. She is ill-appearing (chronically). She is not  toxic-appearing.   Cardiovascular:      Rate and Rhythm: Normal rate.      Pulses: Normal pulses.      Heart sounds: Murmur heard.   Pulmonary:      Effort: Pulmonary effort is normal. No respiratory distress.      Comments: Diminished d/t body habitus  Abdominal:      General: Bowel sounds are normal.      Palpations: Abdomen is soft.      Tenderness: There is no abdominal tenderness.   Musculoskeletal:         General: Swelling present. Normal range of motion.   Skin:     General: Skin is warm and dry.   Neurological:      General: No focal deficit present.      Mental Status: She is alert and oriented to person, place, and time.      Motor: Weakness present.   Psychiatric:         Mood and Affect: Mood normal.         Behavior: Behavior normal.        Discharge Medications        New Medications        Instructions Start Date   insulin lispro 100 UNIT/ML injection  Commonly known as: HUMALOG/ADMELOG   2-9 Units, Subcutaneous, 4 Times Daily With Meals & Nightly      Menthol-Zinc Oxide 0.44-20.6 % ointment   1 application , Topical, 2 Times Daily PRN      midodrine 10 MG tablet  Commonly known as: PROAMATINE   10 mg, Oral, 3 Times Daily Before Meals      multivitamin tablet tablet   1 tablet, Oral, Daily   Start Date: October 27, 2023     pantoprazole 40 MG EC tablet  Commonly known as: Protonix   40 mg, Oral, Daily      polyethylene glycol 17 g packet  Commonly known as: MIRALAX   17 g, Oral, Daily   Start Date: October 27, 2023     sennosides-docusate 8.6-50 MG per tablet  Commonly known as: PERICOLACE   2 tablets, Oral, 2 Times Daily             Changes to Medications        Instructions Start Date   levothyroxine 50 MCG tablet  Commonly known as: SYNTHROID, LEVOTHROID  What changed: when to take this   50 mcg, Oral, Daily      ondansetron 8 MG tablet  Commonly known as: ZOFRAN  What changed: reasons to take this   TAKE ONE TABLET BY MOUTH EVERY 8 HOURS AS NEEDED FOR NAUSEA OR VOMITING             Continue These  Medications        Instructions Start Date   acetaminophen 500 MG tablet  Commonly known as: TYLENOL   1,000 mg, Oral, As Needed      atorvastatin 40 MG tablet  Commonly known as: LIPITOR   TAKE ONE TABLET BY MOUTH ONCE NIGHTLY      diphenoxylate-atropine 2.5-0.025 MG per tablet  Commonly known as: LOMOTIL   1 tablet, Oral, 4 Times Daily PRN      folic acid 1 MG tablet  Commonly known as: FOLVITE   TAKE 1 TABLET BY MOUTH DAILY      Fulvestrant 250 MG/5ML chemo syringe  Commonly known as: FASLODEX   0.02 mL, Intramuscular, Every 30 Days, In MD office Next due: 10/17/23      gabapentin 300 MG capsule  Commonly known as: Neurontin   300 mg, Oral, Nightly      HYDROcodone-acetaminophen 5-325 MG per tablet  Commonly known as: NORCO   1 tablet, Oral, Every 4 Hours PRN      mupirocin 2 % ointment  Commonly known as: BACTROBAN   1 application , Topical, Daily, Applied to fistula insertion sites for dialysis       sertraline 100 MG tablet  Commonly known as: ZOLOFT   TAKE 1 AND 1/2 TABLET BY MOUTH DAILY      Verzenio 100 MG tablet  Generic drug: Abemaciclib   100 mg, Oral, 2 Times Daily      vitamin B-12 1000 MCG tablet  Commonly known as: CYANOCOBALAMIN   1,000 mcg, Oral, Daily             Stop These Medications      Levemir FlexPen 100 UNIT/ML injection  Generic drug: insulin detemir     potassium chloride ER 20 MEQ tablet controlled-release ER tablet  Commonly known as: K-TAB               Additional Instructions for the Follow-ups that You Need to Schedule       Discharge Follow-up with PCP   As directed       Currently Documented PCP:    Kiana Noriega APRN ([place])    PCP Phone Number:    [phone]     Follow Up Details: see in 1-2 weeks        Discharge Follow-up with Specified Provider: Dr. Irvin; 2 Weeks   As directed      To: Dr. Irvin   Follow Up: 2 Weeks   Follow Up Details: rescheduling outpatient PET        Discharge Follow-up with Specified Provider: Nephrology as advised; 2 Weeks   As directed      To:  Nephrology as advised   Follow Up: 2 Weeks               Contact information for follow-up providers       Kiana Noriega APRN ([place]) .    Specialty: Family Medicine  Why: see in 1-2 weeks  Contact information:  [address] [address] [address] [phone]                       Contact information for after-discharge care       Destination       [facility] .    Service: Skilled Nursing  Contact information:  [address] [facility] [address] [phone]                                 Test Results Pending at Discharge     BETY Irving  10/26/23  13:22 EDT    Discharge time spent greater than 30 minutes.

## 2023-10-26 NOTE — CASE MANAGEMENT/SOCIAL WORK
Continued Stay Note  [facility]     Patient Name: Juana Hall  MRN: [id number]  Today's Date: 10/26/2023    Admit Date: 10/13/2023    Plan: DC to [facility] via [facility]   Discharge Plan       Row Name 10/26/23 1044       Plan    Plan DC to [facility] via [facility]    Plan Comments DC to [facility] via [facility]. Informed pt of dc and of PU time of 1930, after dialysis. Contacted [facility] and informed of disposition. Packet to nurse.                   Discharge Codes    No documentation.                 Expected Discharge Date and Time       Expected Discharge Date Expected Discharge Time    Oct 26, 2023               Anjali Cerrato RN

## 2023-10-26 NOTE — PROGRESS NOTES
LOS: 12 days     Chief Complaint/ Reason for encounter: ESRD management    Subjective   10/19/23 : She feels a little better today  Still with frequent bowel movements but less diarrhea  BP remains low normal, on midodrine  NG tube remains in place, clamped  She remains n.p.o. with occasional nausea    10/20: NG tube remains in place, still with copious amounts of NG output  Not as much diarrhea, no abdominal pain  Scheduled for dialysis today.  Weights very labile and do not seem accurate to me    10/21: She complained of abdominal pain and nausea and only received an hour and a half of her dialysis treatment.  I tried to convince her to complete another hour but patient refused  Scheduled for additional dialysis today  NG tube currently clamped    10/22, about the same today, high NG tube output, continues to have diarrhea  Did tolerate dialysis yesterday after receiving albumin and midodrine for blood pressure support, 3.4 L of fluid removed    10/23: Uneventful night was able to tolerate having her NG tube clamped most of the night without nausea, surgery continues to follow and is considering EGD if high output NG tube persists  HD planned for tomorrow  Plan is for outpatient dialysis at [facility] after DC    10/24: She feels much better today, tolerating clears  NG tube has been removed, decreased diarrhea  Less abdominal pain, no dyspnea, stable edema  .  HD planned today    10/25: She feels better today, advancing diet  Minimal nausea, no vomiting  Denies dyspnea, no edema    10/26: Patient seen and examined on hemodialysis  Patient was seen on hemodialysis  Treatment orders discussed with the dialysis RNJuan Manuel  Arterial pressure/ Venous pressure: 130/160  Blood flow rate/Dialysate flow rate: 400/500  Blood pressure: 117/56, pulse 68  Potassium bath/Fluid removal goal: 3K, no UF  BP was low this morning and she received a 500 cc bolus, blood pressure improved from the 80s up to 100s and she is  "stable at that level after dialysis initiation      Medical history reviewed:  History of Present Illness    Subjective    History taken from: Patient and chart    Vital Signs  Temp:  [97.7 °F (36.5 °C)-99 °F (37.2 °C)] 98.4 °F (36.9 °C)  Heart Rate:  [68-77] 69  Resp:  [18] 18  BP: ()/(41-52) 100/52       Wt Readings from Last 1 Encounters:   10/25/23 0544 (!) 162 kg (358 lb 0.4 oz)   10/24/23 0525 (!) 163 kg (359 lb 8 oz)   10/23/23 0322 (!) 160 kg (353 lb 4.8 oz)   10/21/23 0527 (!) 164 kg (360 lb 11.2 oz)   10/20/23 0457 (!) 167 kg (369 lb 1.6 oz)   10/19/23 0500 (!) 162 kg (356 lb 11.2 oz)   10/18/23 0510 (!) 166 kg (365 lb)   10/17/23 0537 (!) 168 kg (371 lb 1.6 oz)   10/14/23 1634 (!) 176 kg (387 lb)   10/14/23 0000 (!) 176 kg (387 lb 6.4 oz)   10/13/23 1251 (!) 154 kg (340 lb)       Objective:  Vital signs: (most recent): Blood pressure 100/52, pulse 69, temperature 98.4 °F (36.9 °C), temperature source Oral, resp. rate 18, height 170.2 cm (67\"), weight (!) 162 kg (358 lb 0.4 oz), SpO2 95%, not currently breastfeeding.                Objective:  General Appearance:  Comfortable, obese, ill-appearing, in no acute distress and not in pain.  Awake, alert, oriented  HEENT: Mucous membranes moist, no injury, oropharynx clear, NG tube in place with high output  Lungs:  Normal effort and normal respiratory rate.  Breath sounds clear to auscultation.  No  respiratory distress.  No rales, decreased breath sounds or rhonchi.    Heart: Normal rate.  Regular rhythm.  S1, S2 normal.  No murmur.   Abdomen: Abdomen is soft.  Bowel sounds are normal, no abdominal tenderness.  There is no rebound or guarding  Extremities: Trace edema of bilateral lower extremities  Neurological: No focal motor or sensory deficits, pupils reactive  Skin:  Warm and dry.  No rash or cyanosis.       Results Review:    Intake/Output:     Intake/Output Summary (Last 24 hours) at 10/26/2023 1444  Last data filed at 10/25/2023 2013  Gross per " 24 hour   Intake 400 ml   Output --   Net 400 ml         DATA:  Radiology and Labs:  The following labs independently reviewed by me. Additional labs ordered for tomorrow a.m.  Interval notes, chart personally reviewed by me.   Old records independently reviewed showing ESRD on dialysis  Discussed with patient    Risk/ complexity of medical care/ medical decision making moderate, dialysis management    Labs:   Recent Results (from the past 24 hour(s))   POC Glucose Once    Collection Time: 10/25/23  4:32 PM    Specimen: Blood   Result Value Ref Range    Glucose 256 (H) 70 - 130 mg/dL   POC Glucose Once    Collection Time: 10/25/23  9:00 PM    Specimen: Blood   Result Value Ref Range    Glucose 191 (H) 70 - 130 mg/dL   Basic Metabolic Panel    Collection Time: 10/26/23  4:08 AM    Specimen: Blood   Result Value Ref Range    Glucose 178 (H) 65 - 99 mg/dL    BUN 29 (H) 8 - 23 mg/dL    Creatinine 5.56 (H) 0.57 - 1.00 mg/dL    Sodium 134 (L) 136 - 145 mmol/L    Potassium 4.0 3.5 - 5.2 mmol/L    Chloride 99 98 - 107 mmol/L    CO2 28.4 22.0 - 29.0 mmol/L    Calcium 8.2 (L) 8.6 - 10.5 mg/dL    BUN/Creatinine Ratio 5.2 (L) 7.0 - 25.0    Anion Gap 6.6 5.0 - 15.0 mmol/L    eGFR 8.0 (L) >60.0 mL/min/1.73   CBC Auto Differential    Collection Time: 10/26/23  4:08 AM    Specimen: Blood   Result Value Ref Range    WBC 7.53 3.40 - 10.80 10*3/mm3    RBC 2.74 (L) 3.77 - 5.28 10*6/mm3    Hemoglobin 8.5 (L) 12.0 - 15.9 g/dL    Hematocrit 26.6 (L) 34.0 - 46.6 %    MCV 97.1 (H) 79.0 - 97.0 fL    MCH 31.0 26.6 - 33.0 pg    MCHC 32.0 31.5 - 35.7 g/dL    RDW 16.6 (H) 12.3 - 15.4 %    RDW-SD 57.8 (H) 37.0 - 54.0 fl    MPV 10.4 6.0 - 12.0 fL    Platelets 173 140 - 450 10*3/mm3    Neutrophil % 74.4 42.7 - 76.0 %    Lymphocyte % 10.8 (L) 19.6 - 45.3 %    Monocyte % 9.8 5.0 - 12.0 %    Eosinophil % 2.7 0.3 - 6.2 %    Basophil % 0.4 0.0 - 1.5 %    Immature Grans % 1.9 (H) 0.0 - 0.5 %    Neutrophils, Absolute 5.61 1.70 - 7.00 10*3/mm3     Lymphocytes, Absolute 0.81 0.70 - 3.10 10*3/mm3    Monocytes, Absolute 0.74 0.10 - 0.90 10*3/mm3    Eosinophils, Absolute 0.20 0.00 - 0.40 10*3/mm3    Basophils, Absolute 0.03 0.00 - 0.20 10*3/mm3    Immature Grans, Absolute 0.14 (H) 0.00 - 0.05 10*3/mm3    nRBC 0.1 0.0 - 0.2 /100 WBC   Prepare RBC, 1 Units    Collection Time: 10/26/23  6:00 AM   Result Value Ref Range    Product Code K5294X16     Unit Number [id number]     UNIT  ABO A     UNIT  RH POS     Crossmatch Interpretation Compatible     Dispense Status XM     Blood Expiration Date 202311132359     Blood Type Barcode 6200     Product Code P3656L24     Unit Number [id number]     UNIT  ABO A     UNIT  RH POS     Crossmatch Interpretation Compatible     Dispense Status PT     Blood Expiration Date 202311132359     Blood Type Barcode 6200    POC Glucose Once    Collection Time: 10/26/23  8:07 AM    Specimen: Blood   Result Value Ref Range    Glucose 178 (H) 70 - 130 mg/dL   POC Glucose Once    Collection Time: 10/26/23 12:02 PM    Specimen: Blood   Result Value Ref Range    Glucose 179 (H) 70 - 130 mg/dL       Radiology:  Pertinent radiology studies were reviewed as described above    Medications have been reviewed separately in chart overview    ASSESSMENT:  ESRD on dialysis, Monday Wednesday Friday.  Normally on HHD 5 days a week at home, plan is for inpatient HD after discharge  Metastatic breast cancer  Obesity  Recurrent ascites  Chronic anemia from renal failure and immunotherapy  Hypokalemia, resolved  Nausea, vomiting and diarrhea, better.  NG tube out  Chronic hypotension on midodrine  Type 2 diabetes mellitus  Hypothyroidism    Diabetes type 2, controlled    Combined systolic and diastolic congestive heart failure    ANDREW (obstructive sleep apnea)      Plan:   BP was in the 80s systolic earlier today but improved to 100/52 after a 500 cc saline bolus  Continue dialysis 3 times per week with in center dialysis at [facility], plans for  rehab at [facility]  Continue midodrine 10 mg 3 times daily  Encourage oral fluid and diet/nutrition as able  Potassium remained stable now that her diarrhea and NG tube output have decreased  Hold potassium at discharge  Continue high-dose Epogen for anemia, transition to Mircera when she goes to [facility]  Home hemodialysis will be on hold until she gets a bit stronger    Immunotherapy per hematology/oncology, daily CBC  Await outpatient dialysis/rehab plans      Tai Antonio MD  [facility]   Office phone number: [phone]  Answering service phone number: [phone]    10/26/23  14:45 EDT    Dictation performed using Dragon dictation software

## 2023-10-26 NOTE — DISCHARGE PLACEMENT REQUEST
"Azael Hall (63 y.o. Female)       Date of Birth   1960    Social Security Number       Address   [address] [place] [address]    Home Phone   [phone]    MRN   [id number]       Rastafari   Patient Refused    Marital Status                               Admission Date   10/13/23    Admission Type   Emergency    Admitting Provider   Moose Pete MD    Attending Provider   Nicolás Rose MD    Department, Room/Bed   [address], [address]       Discharge Date       Discharge Disposition   Skilled Nursing Facility (DC - External)    Discharge Destination                                 Attending Provider: Nicolás Rose MD    Allergies: No Known Allergies    Isolation: None   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)   Code Status: CPR    Ht: 170.2 cm (67\")   Wt: 162 kg (358 lb 0.4 oz)    Admission Cmt: None   Principal Problem: Hypotension [I95.9]                   Active Insurance as of 10/13/2023       Primary Coverage       Payor Plan Insurance Group Employer/Plan Group    [facility] MEDICARE REPLACEMENT [facility] MEDICARE REPLACEMENT [id number]       Payor Plan Address Payor Plan Phone Number Payor Plan Fax Number Effective Dates    [address] [address]   10/1/2023 - None Entered    [facility] [id number]         Subscriber Name Subscriber Birth Date Member ID       AZAEL HALL 1960 [id number]                     Emergency Contacts        (Rel.) Home Phone Work Phone Mobile Phone    Rk Hall (Spouse) [phone] -- [phone]                   Discharge Summary        Steffanie Noriega APRN at 10/26/23 [facility]                          [facility]    Date of Admission: 10/13/2023  Date of Discharge:  10/26/2023    PCP: Kiana Noriega ([place]), BETY    Discharge Diagnosis:   Active Hospital Problems    Diagnosis  POA    **Hypotension [I95.9]  Yes    Chronic heart failure with preserved ejection " fraction (HFpEF) [I50.32]  Yes    Abnormal urinalysis [R82.90]  Yes    Diastolic CHF, chronic [I50.32]  Yes    ANDREW (obstructive sleep apnea) [G47.33]  Yes    Combined systolic and diastolic congestive heart failure [I50.40]  Yes    ESRD (end stage renal disease) [N18.6]  Yes    Morbid obesity with BMI of 50.0-59.9, adult [E66.01, Z68.43]  Not Applicable    Anemia in stage 3 chronic kidney disease [N18.30, D63.1]  Yes    Primary malignant neoplasm of breast with metastasis [C50.919]  Yes    Acquired hypothyroidism [E03.9]  Yes    Diabetes type 2, controlled [E11.9]  Yes      Resolved Hospital Problems    Diagnosis Date Resolved POA    Hypokalemia [E87.6] 10/26/2023 Yes     Procedures Performed  none     Consults  Inpatient consult to nephrology  Inpatient consult to pulmonology  Inpatient consult to cardiology   Inpatient consult to hematology/oncology  Inpatient consult to general surgery    Hospital Course  Please see history and physical for details. Patient is a 63 y.o. female that initially presented to the hospital on 10/13/2023 for weakness, dizziness and multiple falls. She had a brief recent hospitalization at the end of September where she presented with nausea and vomiting found to have a large amount of ascites.  She ultimately underwent ultrasound-guided paracentesis on 10/9 with 11 L of fluid removed precipitated her symptoms.  She was advised by Dr. Irvin her oncologist (known history of breast cancer on therapy) to go to the emergency room.  She typically does home hemodialysis at baseline was noted to be hypotensive on arrival.  She did require transfer to the ICU for vasopressors. She was found to have pneumonia and a UTI as well. Her course has been complicated by an ileus versus small bowel obstruction.    Hypotension was felt to be multifactorial due to infection, large volume paracentesis, hemodialysis as well as aortic valve stenosis.  Midodrine was initiated and increased to 10 mg 3 times  daily per nephrology.  She has had some episodes of hypotension that improved with medication and are asymptomatic.  Her last pressure was in the low 100s systolic.  She has been receiving albumin as needed with hemodialysis.  Nephrology managed her hemodialysis during this stay as she is typically on home dialysis 5 days a week.  She was placed on a Tuesday, Thursday, Saturday schedule while admitted and will continue this at rehab.  She has been getting Epogen for her anemia of chronic disease.  Hemoglobin on 10/25 was 7.4 and hematology oncology recommended transfusion.  They did follow given her history of breast cancer with recent peritoneal carcinomatosis.  She was maintained on her Verzenio and will resume Faslodex in the outpatient setting at the direction of her oncologist.  Dr. Irvin does feel that she may need alteration of her therapy given progression.  She was due for a PET scan which will be rescheduled.  She should see Dr. Irvin in the next couple weeks for ongoing recommendations.   As above, she did complete a course of antibiotics for pneumonia/UTI.  She developed some issues with nausea and vomiting as well as abdominal pain showing ileus versus small bowel obstruction.  NG tube was placed and eventually removed 10/23.  General surgery was consulted and conservative measures taken.  She was put on a clear liquid diet and diet advanced 10/24.  She will need to remain on a GI soft diet indefinitely.  She has been moving her bowels and tolerating a diet with no issues at this time.   She does have known DM 2 and previously on Levemir prior to admission.  She was placed on sliding scale insulin here with modest sugars.  Her long-acting insulin can be resumed in the outpatient setting based on her trends, but would continue correctional sliding scale insulin for now and defer ongoing care to her rehab provider.   Given her A-fib as well as severe aortic valve stenosis and chronic diastolic heart  failure, cardiology was consulted.  Her blood pressure was too low to tolerate any beta-blockade and she was not on any anticoagulation secondary to her anemia.  Unfortunately given her other issues, she has not felt to be a candidate for any intervention for her aortic valve stenosis.  Her volume status was managed with hemodialysis and ultimately palliative care was recommended for goals of care discussion given long-term poor prognosis.  She was not interested in any sort of palliation and remains a full code at this time.   She was noted to have some thickened appearance of the gastric antrum on CT and will remain on PPI.  She may eventually need outpatient EGD if felt necessary by her oncologist or any recurrence of GI symptoms.   On the day of discharge, she denies any chest pain, dyspnea, cough, fever or chills.  She denies any nausea, vomiting or abdominal pain and is feeling much improved.  She is eager to go to rehab but she wants to start getting more mobile in order to return home.  She should follow-up with nephrology as advised as well as cardiology and hematology.  Would recommend she see her PCP in the next 1 to 2 weeks for ongoing care.    I discussed the patient's findings and my recommendations with patient, nursing staff, and Dr. Rose .    Condition on Discharge: Improved.     Temp:  [97.7 °F (36.5 °C)-99 °F (37.2 °C)] 98.4 °F (36.9 °C)  Heart Rate:  [68-77] 69  Resp:  [18] 18  BP: ()/(41-52) 100/52  Body mass index is 56.07 kg/m².    Physical Exam  Vitals and nursing note reviewed.   Constitutional:       Appearance: She is obese. She is ill-appearing (chronically). She is not toxic-appearing.   Cardiovascular:      Rate and Rhythm: Normal rate.      Pulses: Normal pulses.      Heart sounds: Murmur heard.   Pulmonary:      Effort: Pulmonary effort is normal. No respiratory distress.      Comments: Diminished d/t body habitus  Abdominal:      General: Bowel sounds are normal.       Palpations: Abdomen is soft.      Tenderness: There is no abdominal tenderness.   Musculoskeletal:         General: Swelling present. Normal range of motion.   Skin:     General: Skin is warm and dry.   Neurological:      General: No focal deficit present.      Mental Status: She is alert and oriented to person, place, and time.      Motor: Weakness present.   Psychiatric:         Mood and Affect: Mood normal.         Behavior: Behavior normal.        Discharge Medications        New Medications        Instructions Start Date   insulin lispro 100 UNIT/ML injection  Commonly known as: HUMALOG/ADMELOG   2-9 Units, Subcutaneous, 4 Times Daily With Meals & Nightly      Menthol-Zinc Oxide 0.44-20.6 % ointment   1 application , Topical, 2 Times Daily PRN      midodrine 10 MG tablet  Commonly known as: PROAMATINE   10 mg, Oral, 3 Times Daily Before Meals      multivitamin tablet tablet   1 tablet, Oral, Daily   Start Date: October 27, 2023     pantoprazole 40 MG EC tablet  Commonly known as: Protonix   40 mg, Oral, Daily      polyethylene glycol 17 g packet  Commonly known as: MIRALAX   17 g, Oral, Daily   Start Date: October 27, 2023     sennosides-docusate 8.6-50 MG per tablet  Commonly known as: PERICOLACE   2 tablets, Oral, 2 Times Daily             Changes to Medications        Instructions Start Date   levothyroxine 50 MCG tablet  Commonly known as: SYNTHROID, LEVOTHROID  What changed: when to take this   50 mcg, Oral, Daily      ondansetron 8 MG tablet  Commonly known as: ZOFRAN  What changed: reasons to take this   TAKE ONE TABLET BY MOUTH EVERY 8 HOURS AS NEEDED FOR NAUSEA OR VOMITING             Continue These Medications        Instructions Start Date   acetaminophen 500 MG tablet  Commonly known as: TYLENOL   1,000 mg, Oral, As Needed      atorvastatin 40 MG tablet  Commonly known as: LIPITOR   TAKE ONE TABLET BY MOUTH ONCE NIGHTLY      diphenoxylate-atropine 2.5-0.025 MG per tablet  Commonly known as:  LOMOTIL   1 tablet, Oral, 4 Times Daily PRN      folic acid 1 MG tablet  Commonly known as: FOLVITE   TAKE 1 TABLET BY MOUTH DAILY      Fulvestrant 250 MG/5ML chemo syringe  Commonly known as: FASLODEX   0.02 mL, Intramuscular, Every 30 Days, In MD office Next due: 10/17/23      gabapentin 300 MG capsule  Commonly known as: Neurontin   300 mg, Oral, Nightly      HYDROcodone-acetaminophen 5-325 MG per tablet  Commonly known as: NORCO   1 tablet, Oral, Every 4 Hours PRN      mupirocin 2 % ointment  Commonly known as: BACTROBAN   1 application , Topical, Daily, Applied to fistula insertion sites for dialysis       sertraline 100 MG tablet  Commonly known as: ZOLOFT   TAKE 1 AND 1/2 TABLET BY MOUTH DAILY      Verzenio 100 MG tablet  Generic drug: Abemaciclib   100 mg, Oral, 2 Times Daily      vitamin B-12 1000 MCG tablet  Commonly known as: CYANOCOBALAMIN   1,000 mcg, Oral, Daily             Stop These Medications      Levemir FlexPen 100 UNIT/ML injection  Generic drug: insulin detemir     potassium chloride ER 20 MEQ tablet controlled-release ER tablet  Commonly known as: K-TAB               Additional Instructions for the Follow-ups that You Need to Schedule       Discharge Follow-up with PCP   As directed       Currently Documented PCP:    Kiana Noriega APRN ([place])    PCP Phone Number:    [phone]     Follow Up Details: see in 1-2 weeks        Discharge Follow-up with Specified Provider: Dr. Irvin; 2 Weeks   As directed      To: Dr. Irvin   Follow Up: 2 Weeks   Follow Up Details: rescheduling outpatient PET        Discharge Follow-up with Specified Provider: Nephrology as advised; 2 Weeks   As directed      To: Nephrology as advised   Follow Up: 2 Weeks               Contact information for follow-up providers       Kiana Noriega APRN ([place]) .    Specialty: Family Medicine  Why: see in 1-2 weeks  Contact information:  [address] [address]  [phone]                        Contact information for after-discharge care       Destination       [facility] .    Service: Skilled Nursing  Contact information:  [address] [facility] [address] [phone]                                 Test Results Pending at Discharge     BETY Irving  10/26/23  13:22 EDT    Discharge time spent greater than 30 minutes.    Electronically signed by Steffanie Noriega APRN at 10/26/23 1326       Discharge Order (From admission, onward)       Start     Ordered    10/26/23 0859  Discharge patient  Once        Expected Discharge Date: 10/26/23   Expected Discharge Time: Afternoon   Discharge Disposition: Skilled Nursing Facility (DC - External)   Physician of Record for Attribution - Please select from Treatment Team: KAILEY JOHNSON [[id number]]   Review needed by CMO to determine Physician of Record: No      Question Answer Comment   Physician of Record for Attribution - Please select from Treatment Team KAILEY JOHNSON    Review needed by CMO to determine Physician of Record No        10/26/23 0858

## 2023-10-26 NOTE — CASE MANAGEMENT/SOCIAL WORK
"Physicians Statement of Medical Necessity for  Ambulance Transportation    GENERAL INFORMATION     Name: Juana Hall  YOB: 1960  Medicare #: [employer] Medicare Replacement [id number]  Transport Date: 10- (Valid for round trips this date, or for scheduled repetitive trips for 60 days from the date signed below.)  Origin: [facility]  Destination: [facility]  Is the Patient's stay covered under Medicare Part A (PPS/DRG?)Yes  Closest appropriate facility? Yes  If this a hosp-hosp transfer? No  Is this a hospice patient? No    MEDICAL NECESSITY QUESTIONAIRE    Ambulance Transportation is medically necessary only if other means of transportation are contraindicated or would be potentially harmful to the patient.  To meet this requirement, the patient must be either \"bed confined\" or suffer from a condition such that transport by means other than an ambulance is contraindicated by the patient's condition.  The following questions must be answered by the healthcare professional signing below for this form to be valid:     1) Describe the MEDICAL CONDITION (physical and/or mental) of this patient AT THE TIME OF AMBULANCE TRANSPORT that requires the patient to be transported in an ambulance, and why transport by other means is contraindicated by the patient's condition:   Past Medical History:   Diagnosis Date    Anemia     Anxiety and depression     Bicuspid aortic valve     had surgery    Breast cancer 2004    RIGHT, HAD NEELA. 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    History of sepsis 2010    KIDNEY STONE    History of transfusion     no reaction    Hyperlipidemia     Hyperthyroidism     Hypothyroidism     Kidney stone     CURRENT LEFT-DEC 2021    Lymphedema of leg     LEFT    Metastasis from breast cancer 2019    STOMACH-OMENTUM    Neuromuscular disorder     Obesity     ANDREW on CPAP     CPAP    Osteoarthrosis     Peripheral neuropathy     FEET BILAT    Radiculopathy     Rectal bleeding 08/16/2022    Thickened endometrium     Urinary incontinence     wears pads    Weakness     BILAT LEGS-WITH ANY AMT OF TIME      Past Surgical History:   Procedure Laterality Date    AORTIC VALVULOPLASTY  01/2023    ARTERIOVENOUS FISTULA/SHUNT SURGERY Left 11/03/2021    Procedure: LEFT  BRACHIOCEPALIC ARTERIOVENOUS FISTULA;  Surgeon: Dejuan Adler MD;  Location: [facility] MAIN OR;  Service: Vascular;  Laterality: Left;    BREAST RECONSTRUCTION  2004    WITH IMPLANTS, AND REVISION, NOW REMOVED    BREAST SURGERY Bilateral 2004    breast implants removed and then replaced due to infection    CARDIAC CATHETERIZATION N/A 08/23/2022    Procedure: CORONARY ANGIOGRAPHY;  Surgeon: Luis Rivers MD;  Location: [facility] CATH INVASIVE LOCATION;  Service: Cardiology;  Laterality: N/A;    CARDIAC CATHETERIZATION N/A 08/23/2022    Procedure: Right Heart Cath;  Surgeon: Luis Rivers MD;  [facility];  Service: Cardiology;  Laterality: N/A;    CARDIAC CATHETERIZATION N/A 01/10/2023    Procedure: Valvuloplasty;  Surgeon: Luis Rivers MD;  Location:  [facility] CATH INVASIVE LOCATION;  Service: Cardiovascular;  Laterality: N/A;  01/10/2023    CARDIAC CATHETERIZATION N/A 01/10/2023    Procedure: Aortic root aortogram;  Surgeon: Luis Rivers MD;  [facility];  Service: Cardiovascular;  Laterality: N/A;  "   CATARACT EXTRACTION WITH INTRAOCULAR LENS IMPLANT Bilateral      SECTION  1987     SECTION  1987    CYSTOSCOPY W/ URETERAL STENT PLACEMENT  2010    CYSTOSCOPY W/ URETERAL STENT PLACEMENT Left 2021    Procedure: CYSTOSCOPY KEFT RETROGRADE PYELOGRAM  LEFT  URETERAL STENT PLACEMENT;  Surgeon: Leodan Mckee Jr., MD;  Location: [facility] MAIN OR;  Service: Urology;  Laterality: Left;    CYSTOSCOPY W/ URETERAL STENT PLACEMENT Left 03/10/2022    Procedure: CYSTOSCOPY AND LEFT URETERAL STENT EXCHANGE, RETROGRADE PYELOGRAM;  Surgeon: Leodan Mckee Jr., MD;  Location[facility];  Service: Urology;  Laterality: Left;    CYSTOSCOPY W/ URETERAL STENT PLACEMENT Left 2023    Procedure: CYSTOSCOPY WITH LEFT URETERAL STENT PLACEMENT, RETROGRADE PYELOGRAM, CLOT EVACUATION, BLADDER FULGARATION;  Surgeon: Leodan Mckee Jr., MD;  Location: [facility] MAIN OR;  Service: Urology;  Laterality: Left;    D & C HYSTEROSCOPY N/A 2017    Procedure: DILATATION AND CURETTAGE, HYSTEROSCOPY ;  Surgeon: Cherie Oliveros MD;  [facility];  Service:     D & C HYSTEROSCOPY N/A 2019    Procedure: DILATATION AND CURETTAGE HYSTEROSCOPY/POLYPECTOMY;  Surgeon: Cherie Oliveros MD;  [facility];  Service: Gynecology    D & C HYSTEROSCOPY ENDOMETRIAL ABLATION N/A 2023    Procedure: DILATATION AND CURETTAGE;  Surgeon: Ina Marcos MD;  Location: [facility] MAIN OR;  Service: Obstetrics/Gynecology;  Laterality: N/A;    ENDOSCOPY      INSERTION AND REMOVAL HEMODIALYSIS CATHETER Right 2021    INSERTION HEMODIALYSIS CATHETER Right 2021    Procedure: HEMODIALYSIS CATHETER INSERTION;  Surgeon: Clint Hernández MD;  Location[facility] OR;  Service: Vascular;  Laterality: Right;    LYMPH NODE BIOPSY      MASTECTOMY Bilateral 2004    VENOUS ACCESS DEVICE (PORT) INSERTION AND REMOVAL        2) Is this patient \"bed confined\" as defined below?Yes   To " "be \"bed confined\" the patient must satisfy all three of the following criteria:  (1) unable to get up from bed without assistance; AND (2) unable to ambulate;  AND (3) unable to sit in a chair or wheelchair.  3) Can this patient safely be transported by car or wheelchair van (I.e., may safely sit during transport, without an attendant or monitoring?)No   4. In addition to completing questions 1-3 above, please check any of the following conditions that apply:          *Note: supporting documentation for any boxes checked must be maintained in the patient's medical records Moderate/severe pain on movement, Morbid obesity requires additional personnel/equipment to safely handle patient, and Other High falls risk, limited mobility      SIGNATURE OF PHYSICIAN OR OTHER AUTHORIZED HEALTHCARE PROFESSIONAL    I certify that the above information is true and correct based on my evaluation of this patient, and represent that the patient requires transport by ambulance and that other forms of transport are contraindicated.  I understand that this information will be used by the Centers for Medicare and Medicaid Services (CMS) to support the determiniation of medical necessity for ambulance services, and I represent that I have personal knowledge of the patient's condition at the time of transport.       If this box is checked, I also certify that the patient is physically or mentally incapable of signing the ambulance service's claim form and that the institution with which I am affiliated has furnished care, services or assistance to the patient.  My signature below is made on behalf of the patient pursuant to 42 .36(b)(4). In accordance with 42 .37, the specific reason(s) that the patient is physically or mentally incapable of signing the claim for is as follows:     Signature of Physician or Healthcare Professional  Date/Time:        (For Scheduled repetitive transport, this form is not valid for transports " performed more than 60 days after this date).                                                                                                                                            --------------------------------------------------------------------------------------------  Printed Name and Credentials of Physician or Authorized Healthcare Professional     Form must be signed by patient's attending physician for scheduled, repetitive transports,.  For non-repetitive ambulance transports, if unable to obtain the signature of the attending physician, any of the following may sign (please select below):     Physician  Clinical Nurse Specialist  Registered Nurse     Physician Assistant  Discharge Planner  Licensed Practical Nurse     Nurse Practitioner

## 2023-10-27 NOTE — CASE MANAGEMENT/SOCIAL WORK
Case Management Discharge Note      Final Note: DC to [facility] via [facility]    Provided Post Acute Provider List?: Yes  Post Acute Provider List: Nursing Home ([facility])  Provided Post Acute Provider Quality & Resource List?: Yes  Post Acute Provider Quality and Resource List:  (SNF)  Delivered To: Patient  Method of Delivery: In person    Selected Continued Care - Discharged on 10/26/2023 Admission date: 10/13/2023 - Discharge disposition: Skilled Nursing Facility (DC - External)      Destination Coordination complete.      Service Provider Selected Services Address Phone Fax Patient Preferred    SIGNATURE [facility] [facility] [address] [phone] --              Durable Medical Equipment    No services have been selected for the patient.                Dialysis/Infusion    No services have been selected for the patient.                Home Medical Care    No services have been selected for the patient.                Therapy    No services have been selected for the patient.                Community Resources    No services have been selected for the patient.                Community & DME    No services have been selected for the patient.                    Selected Continued Care - Episodes Includes continued care and service providers with selected services from the active episodes listed below      Oncology- External Fill Episode start date: 11/15/2021   There are no active outsourced providers for this episode.                 Transportation Services  Ambulance: [facility]    Final Discharge Disposition Code: 03 - skilled nursing facility (SNF)

## 2023-11-02 NOTE — ED NOTES
Nursing report ED to floor  Juana Hall  63 y.o.  female    HPI :   Chief Complaint   Patient presents with    Hypotension       Admitting doctor:   Mary Rose MD    Admitting diagnosis:   The primary encounter diagnosis was Hypotension, unspecified hypotension type. A diagnosis of ESRD (end stage renal disease) was also pertinent to this visit.    Code status:   Current Code Status       Date Active Code Status Order ID Comments User Context       Prior            Allergies:   Patient has no known allergies.    Isolation:   No active isolations    Intake and Output    Intake/Output Summary (Last 24 hours) at 11/2/2023 1546  Last data filed at 11/2/2023 1439  Gross per 24 hour   Intake 500 ml   Output --   Net 500 ml       Weight:       11/02/23  0952   Weight: (!) 158 kg (348 lb 1.7 oz)       Most recent vitals:   Vitals:    11/02/23 1507 11/02/23 1508 11/02/23 1509 11/02/23 1510   BP:       Patient Position:       Pulse: 93 81 85 93   Resp:       Temp:       TempSrc:       SpO2: 93% 94%  93%   Weight:       Height:           Active LDAs/IV Access:   Lines, Drains & Airways       Active LDAs       Name Placement date Placement time Site Days    Peripheral IV 11/02/23 1141 Right Antecubital 11/02/23  1141  Antecubital  less than 1                    Labs (abnormal labs have a star):   Labs Reviewed   COMPREHENSIVE METABOLIC PANEL - Abnormal; Notable for the following components:       Result Value    Glucose 156 (*)     BUN 33 (*)     Creatinine 6.52 (*)     Sodium 133 (*)     Chloride 95 (*)     Calcium 8.1 (*)     Albumin 2.1 (*)     Alkaline Phosphatase 122 (*)     BUN/Creatinine Ratio 5.1 (*)     eGFR 6.6 (*)     All other components within normal limits    Narrative:     GFR Normal >60  Chronic Kidney Disease <60  Kidney Failure <15     CBC WITH AUTO DIFFERENTIAL - Abnormal; Notable for the following components:    RBC 2.73 (*)     Hemoglobin 8.3 (*)     Hematocrit 26.4 (*)     MCHC 31.4 (*)      RDW 16.0 (*)     RDW-SD 55.8 (*)     Neutrophil % 82.3 (*)     Lymphocyte % 9.9 (*)     Monocyte % 4.5 (*)     Immature Grans % 1.7 (*)     Lymphocytes, Absolute 0.66 (*)     Immature Grans, Absolute 0.11 (*)     nRBC 0.3 (*)     All other components within normal limits   TROPONIN - Abnormal; Notable for the following components:    HS Troponin T 62 (*)     All other components within normal limits    Narrative:     High Sensitive Troponin T Reference Range:  <10.0 ng/L- Negative Female for AMI  <15.0 ng/L- Negative Male for AMI  >=10 - Abnormal Female indicating possible myocardial injury.  >=15 - Abnormal Male indicating possible myocardial injury.   Clinicians would have to utilize clinical acumen, EKG, Troponin, and serial changes to determine if it is an Acute Myocardial Infarction or myocardial injury due to an underlying chronic condition.        TSH - Abnormal; Notable for the following components:    TSH 6.080 (*)     All other components within normal limits   T4, FREE - Abnormal; Notable for the following components:    Free T4 0.78 (*)     All other components within normal limits    Narrative:     Results may be falsely increased if patient taking Biotin.     HIGH SENSITIVITIY TROPONIN T 2HR   CBC AND DIFFERENTIAL    Narrative:     The following orders were created for panel order CBC & Differential.  Procedure                               Abnormality         Status                     ---------                               -----------         ------                     CBC Auto Differential[[id number]]        Abnormal            Final result                 Please view results for these tests on the individual orders.       EKG:   ECG 12 Lead Bradycardia   Preliminary Result   HEART RATE= 104  bpm   RR Interval= 577  ms   MO Interval=   ms   P Horizontal Axis=   deg   P Front Axis=   deg   QRSD Interval= 108  ms   QT Interval= 328  ms   QTcB= 432  ms   QRS Axis= 54  deg   T Wave Axis= 225  deg   -  ABNORMAL ECG -   Atrial fibrillation   Low voltage, precordial leads   Anteroseptal infarct, old   Nonspecific T abnormalities, lateral leads   Electronically Signed By:    Date and Time of Study: 2023-11-02 10:26:46          Meds given in ED:   Medications   sodium chloride 0.9 % flush 10 mL (has no administration in time range)   HYDROmorphone (DILAUDID) injection 0.5 mg (0.5 mg Intravenous Given 11/2/23 1142)   ondansetron (ZOFRAN) injection 4 mg (4 mg Intravenous Given 11/2/23 1142)   sodium chloride 0.9 % bolus 500 mL (0 mL Intravenous Stopped 11/2/23 1439)   sodium chloride 0.9 % bolus 500 mL (500 mL Intravenous New Bag 11/2/23 1511)       Imaging results:  XR Chest 1 View    Result Date: 11/2/2023  Small left basilar atelectasis or infiltrate, minimal left pleural effusion. Borderline heart size with mild central vascular prominence  This report was finalized on 11/2/2023 2:14 PM by Dr. Jd Serna M.D on Workstation: [email or web address]       Ambulatory status:   Up w/ assistance     Social issues:   Social History     Socioeconomic History    Marital status:      Spouse name: Rk   Tobacco Use    Smoking status: Never     Passive exposure: Never    Smokeless tobacco: Never   Vaping Use    Vaping Use: Never used   Substance and Sexual Activity    Alcohol use: No    Drug use: Never    Sexual activity: Yes     Partners: Male     Birth control/protection: Post-menopausal       NIH Stroke Scale:       Ana Naik RN  11/02/23 15:46 EDT

## 2023-11-02 NOTE — ED NOTES
Pt arrives via EMS from the dialysis center. States that during her treatment she had bradycardia and hypotension. EMS reports that she has had a normal HR and BP while they have had her. Pt states that her fistula infiltrated during her treatment 2 days ago. Only had 1.5 hours of treatment today.

## 2023-11-03 NOTE — PROGRESS NOTES
Nutrition Services    Patient Name:  Juana Hall  YOB: 1960  MRN: [id number]  Admit Date:  11/2/2023    Assessment Date:  11/03/23    Summary: Nutrition assessment triggered by chart skin report.  Admitted from dialysis center with hypotension.      Stage III pressure injury noted to R lower anus.  Adding Jack BID to help promote wound healing.    RD to continue to follow.    CLINICAL NUTRITION ASSESSMENT      Reason for Assessment Pressure Injury and/or Non-Healing Wound     Diagnosis/Problem   Hypotension    Medical/Surgical History Past Medical History:   Diagnosis Date    Anemia     Anxiety and depression     Bicuspid aortic valve     had surgery    Breast cancer 2004    RIGHT, HAD NEELA. 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    History of sepsis 2010    KIDNEY STONE    History of transfusion     no reaction    Hyperlipidemia     Hyperthyroidism     Hypothyroidism     Kidney stone     CURRENT LEFT-DEC 2021    Lymphedema of leg     LEFT    Metastasis from breast cancer 2019    STOMACH-OMENTUM    Neuromuscular disorder     Obesity     ANDREW on CPAP     CPAP    Osteoarthrosis     Peripheral neuropathy     FEET BILAT    Radiculopathy     Rectal bleeding 08/16/2022    Thickened endometrium     Urinary incontinence     wears pads    Weakness     BILAT LEGS-WITH ANY AMT OF TIME       Past Surgical History:   Procedure Laterality Date    AORTIC VALVULOPLASTY  01/2023     ARTERIOVENOUS FISTULA/SHUNT SURGERY Left 2021    Procedure: LEFT  BRACHIOCEPALIC ARTERIOVENOUS FISTULA;  Surgeon: Dejuan Adler MD;  Location: [facility] MAIN OR;  Service: Vascular;  Laterality: Left;    BREAST RECONSTRUCTION  2004    WITH IMPLANTS, AND REVISION, NOW REMOVED    BREAST SURGERY Bilateral 2004    breast implants removed and then replaced due to infection    CARDIAC CATHETERIZATION N/A 2022    Procedure: CORONARY ANGIOGRAPHY;  Surgeon: Luis Rivers MD;  Location:  [facility];  Service: Cardiology;  Laterality: N/A;    CARDIAC CATHETERIZATION N/A 2022    Procedure: Right Heart Cath;  Surgeon: Luis Rivers MD;  Location:  [facility];  Service: Cardiology;  Laterality: N/A;    CARDIAC CATHETERIZATION N/A 01/10/2023    Procedure: Valvuloplasty;  Surgeon: Luis Rivers MD;  Location:  [facility];  Service: Cardiovascular;  Laterality: N/A;  01/10/2023    CARDIAC CATHETERIZATION N/A 01/10/2023    Procedure: Aortic root aortogram;  Surgeon: Luis Rivers MD;  Location:  [facility];  Service: Cardiovascular;  Laterality: N/A;    CATARACT EXTRACTION WITH INTRAOCULAR LENS IMPLANT Bilateral      SECTION  1987     SECTION  1987    CYSTOSCOPY W/ URETERAL STENT PLACEMENT      CYSTOSCOPY W/ URETERAL STENT PLACEMENT Left 2021    Procedure: CYSTOSCOPY KEFT RETROGRADE PYELOGRAM  LEFT  URETERAL STENT PLACEMENT;  Surgeon: Leodan Mckee Jr., MD;  Location: [facility] MAIN OR;  Service: Urology;  Laterality: Left;    CYSTOSCOPY W/ URETERAL STENT PLACEMENT Left 03/10/2022    Procedure: CYSTOSCOPY AND LEFT URETERAL STENT EXCHANGE, RETROGRADE PYELOGRAM;  Surgeon: Leodan Mckee Jr., MD;  Location: [facility] MAIN OR;  Service: Urology;  Laterality: Left;    CYSTOSCOPY W/ URETERAL STENT PLACEMENT Left 2023    Procedure: CYSTOSCOPY WITH LEFT  "URETERAL STENT PLACEMENT, RETROGRADE PYELOGRAM, CLOT EVACUATION, BLADDER FULGARATION;  Surgeon: Leodan Mckee Jr., MD;  Location: [facility] MAIN OR;  Service: Urology;  Laterality: Left;    D & C HYSTEROSCOPY N/A 05/22/2017    Procedure: DILATATION AND CURETTAGE, HYSTEROSCOPY ;  Surgeon: Cherie Oliveros MD;  Location:  [facility];  Service:     D & C HYSTEROSCOPY N/A 09/19/2019    Procedure: DILATATION AND CURETTAGE HYSTEROSCOPY/POLYPECTOMY;  Surgeon: Cherie Oliveros MD;  Location:  [facility];  Service: Gynecology    D & C HYSTEROSCOPY ENDOMETRIAL ABLATION N/A 5/5/2023    Procedure: DILATATION AND CURETTAGE;  Surgeon: Ina Marcos MD;  Location[facility] MAIN OR;  Service: Obstetrics/Gynecology;  Laterality: N/A;    ENDOSCOPY      INSERTION AND REMOVAL HEMODIALYSIS CATHETER Right 05/01/2021    INSERTION HEMODIALYSIS CATHETER Right 05/01/2021    Procedure: HEMODIALYSIS CATHETER INSERTION;  Surgeon: Clint Hernández MD;  Location: [facility] MAIN OR;  Service: Vascular;  Laterality: Right;    LYMPH NODE BIOPSY      MASTECTOMY Bilateral 2004    VENOUS ACCESS DEVICE (PORT) INSERTION AND REMOVAL          Anthropometrics        Current Height  Current Weight  BMI kg/m2 Height: 170.2 cm (67\")  Weight: (!) 157 kg (345 lb 7.4 oz) (11/02/23 2017)  Body mass index is 54.11 kg/m².   Adjusted BMI (if applicable)    BMI Category Obese, Class III (40 or higher)   Ideal Body Weight (IBW) 135 lb (61.6 kg)   Usual Body Weight (UBW) Unsure   Weight Trend Unknown   Weight History Wt Readings from Last 30 Encounters:   11/02/23 2017 (!) 157 kg (345 lb 7.4 oz)   11/02/23 0952 (!) 158 kg (348 lb 1.7 oz)   10/26/23 1635 (!) 169 kg (372 lb 12.8 oz)   10/25/23 0544 (!) 162 kg (358 lb 0.4 oz)   10/24/23 0525 (!) 163 kg (359 lb 8 oz)   10/23/23 0322 (!) 160 kg (353 lb 4.8 oz)   10/21/23 0527 (!) 164 kg (360 lb 11.2 oz)   10/20/23 0457 (!) 167 kg (369 lb 1.6 oz)   10/19/23 0500 (!) 162 kg (356 lb 11.2 oz)   10/18/23 0510 (!) 166 kg (365 " lb)   10/17/23 0537 (!) 168 kg (371 lb 1.6 oz)   10/14/23 1634 (!) 176 kg (387 lb)   10/14/23 0000 (!) 176 kg (387 lb 6.4 oz)   10/13/23 1251 (!) 154 kg (340 lb)   10/09/23 1237 (!) 160 kg (352 lb 11.8 oz)   09/30/23 0339 (!) 171 kg (376 lb 1.7 oz)   09/29/23 0323 (!) 172 kg (379 lb 3.1 oz)   09/28/23 2046 (!) 172 kg (380 lb)   09/28/23 1159 (!) 169 kg (372 lb 9.2 oz)   09/06/23 0803 (!) 169 kg (372 lb 9.2 oz)   07/19/23 1521 (!) 168 kg (370 lb)   07/12/23 1626 (!) 167 kg (368 lb 2.7 oz)   05/11/23 1342 (!) 167 kg (368 lb 2.7 oz)   05/11/23 1027 (!) 166 kg (365 lb 15.4 oz)   05/05/23 0611 (!) 168 kg (370 lb 6 oz)   05/03/23 0651 (!) 168 kg (370 lb 6 oz)   04/27/23 1349 (!) 166 kg (365 lb 15.4 oz)   04/20/23 1306 (!) 165 kg (363 lb 12.1 oz)   04/13/23 0755 (!) 165 kg (363 lb 12.1 oz)   03/21/23 1323 (!) 165 kg (363 lb 12.1 oz)   03/16/23 0837 (!) 159 kg (350 lb)   02/16/23 0959 (!) 164 kg (361 lb 8.9 oz)   02/13/23 1234 (!) 165 kg (363 lb 12.1 oz)   01/20/23 1348 (!) 164 kg (361 lb)   01/19/23 0911 (!) 164 kg (361 lb 8.9 oz)   01/10/23 1100 (!) 165 kg (363 lb 12.1 oz)   01/10/23 0821 (!) 165 kg (363 lb 12.1 oz)   01/06/23 0500 (!) 168 kg (370 lb 6 oz)   01/05/23 1331 (!) 166 kg (366 lb 10 oz)   01/04/23 0838 (!) 167 kg (368 lb 11.2 oz)   01/03/23 2245 (!) 165 kg (363 lb 12.1 oz)   12/08/22 0945 (!) 165 kg (363 lb)   11/10/22 1136 (!) 164 kg (361 lb 8 oz)   10/25/22 1301 (!) 165 kg (363 lb)   10/13/22 1132 (!) 163 kg (359 lb 5.6 oz)   09/15/22 1523 (!) 165 kg (363 lb)   08/25/22 1317 (!) 171 kg (378 lb)   08/23/22 0713 (!) 163 kg (360 lb)   08/18/22 1306 (!) 163 kg (360 lb)      --  Estimated/Assessed Needs        Current Weight  Weight: (!) 157 kg (345 lb 7.4 oz) (11/02/23 2017)       Energy Requirements    Weight for Calculation 135 lb (61.6 kg)   Method for Estimation  35-40 kcal/kg   EST Needs (kcal/day) 5613-9091       Protein Requirements    Weight for Calculation 135 lb (61.6 kg)   EST Protein Needs (g/kg) 1.5  - 2.0 gm/kg   EST Daily Needs (g/day)        Fluid Requirements     Method for Estimation 1 mL/kcal    EST Needs (mL/day)      Labs       Pertinent Labs    Results from last 7 days   Lab Units 11/03/23  0632 11/02/23  1316   SODIUM mmol/L 132* 133*   POTASSIUM mmol/L 5.4* 4.8   CHLORIDE mmol/L 96* 95*   CO2 mmol/L 23.0 25.7   BUN mg/dL 35* 33*   CREATININE mg/dL 6.78* 6.52*   CALCIUM mg/dL 7.8* 8.1*   BILIRUBIN mg/dL  --  0.6   ALK PHOS U/L  --  122*   ALT (SGPT) U/L  --  17   AST (SGOT) U/L  --  24   GLUCOSE mg/dL 122* 156*     Results from last 7 days   Lab Units 11/03/23  0632 11/02/23  1316   HEMOGLOBIN g/dL 7.7*  --    HEMATOCRIT % 24.2*  --    WBC 10*3/mm3 6.89  --    ALBUMIN g/dL  --  2.1*     Results from last 7 days   Lab Units 11/03/23  0632 11/02/23  1144   PLATELETS 10*3/mm3 217 235     COVID19   Date Value Ref Range Status   10/13/2023 Not Detected Not Detected - Ref. Range Final     Lab Results   Component Value Date    HGBA1C 6.00 (H) 10/14/2023          Medications           Scheduled Medications Abemaciclib, 100 mg, Oral, BID  atorvastatin, 40 mg, Oral, Nightly  folic acid, 1 mg, Oral, Daily  gabapentin, 300 mg, Oral, Nightly  levothyroxine, 100 mcg, Oral, Q AM  midodrine, 10 mg, Oral, TID AC  multivitamin, 1 tablet, Oral, Daily  pantoprazole, 40 mg, Oral, QAM  senna-docusate sodium, 2 tablet, Oral, BID  sertraline, 150 mg, Oral, Daily  sodium chloride, 500 mL, Intravenous, Once  vitamin B-12, 1,000 mcg, Oral, Daily       Infusions     PRN Medications   acetaminophen    senna-docusate sodium **AND** polyethylene glycol **AND** bisacodyl **AND** bisacodyl    melatonin    ondansetron **OR** ondansetron    [COMPLETED] Insert Peripheral IV **AND** sodium chloride     Physical Findings          General Findings alert, obese, oriented, on oxygen therapy   Oral/Mouth Cavity WDL   Edema  no edema   Gastrointestinal last bowel movement: 11/2   Skin  bruising, pressure injury: R lower anus st III,  location of wound: L thigh   Tubes/Drains/Lines none   NFPE Not indicated at this time   --  Current Nutrition Orders & Evaluation of Intake       Oral Nutrition     Food Allergies NKFA   Current PO Diet Diet: Cardiac Diets; Healthy Heart (2-3 Na+); Texture: Regular Texture (IDDSI 7); Fluid Consistency: Thin (IDDSI 0)   Supplement n/a   PO Evaluation     % PO Intake No intake available     Factors Affecting Intake: No factors at this time   --  PES STATEMENT / NUTRITION DIAGNOSIS      Nutrition Dx Problem  Problem: Increased Nutrient Needs  Etiology: Medical Diagnosis - pressure injury    Signs/Symptoms: Report/Observation     NUTRITION INTERVENTION / PLAN OF CARE      Intervention Goal(s) Maintain nutrition status, Reduce/improve symptoms, Meet estimated needs, Disease management/therapy, Establish PO intake, Tolerate PO , and Appropriate weight loss         RD Intervention/Action Continue to monitor, Care plan reviewed, and Recommend/order: ONS   --      Prescription/Orders:       PO Diet       Supplements Jack BID mixed in crystal light      Enteral Nutrition       Parenteral Nutrition    New Prescription Ordered? Yes   --      Monitor/Evaluation Per protocol, PO intake, Supplement intake, Pertinent labs, Skin status   Discharge Plan/Needs Pending clinical course   --    RD to follow per protocol.      Electronically signed by:  Odette Al RD  11/03/23 10:45 EDT

## 2023-11-03 NOTE — PLAN OF CARE
Pt transferred to CICU for hypotension, unable to get accurate automatic cuff pressure, manual pressure cuff at bedside. [place] insertion attempted but unsuccessful and wouldn't thread, ultrasound showed very small artery. Pt is AOx4, no pain. PRBC infusing now, MRI screening form completed and faxed to MRI.   Goal Outcome Evaluation:  Plan of Care Reviewed With: patient, family        Progress: improving

## 2023-11-03 NOTE — NURSING NOTE
CWON note: pt seen for evaluation of sacral/ buttocks skin. She is alert and oriented, able to reposition with some assistance. Due to wide body habitus, I have ordered pt a bariatric ERLINDA mattress to aid in control of microclimate and pressure redistribution #[id number]. She has partial thickness skin loss in the gluteal cleft with deeper appearance at the base/ coccyx area. She is morbidly obese with a deep gluteal cleft with inability to keep a dressing over this area. Will order some Calmoseptine ointment which can be applied 4x day. Skin folds look good, but micatin powder with pillow case to wick moisture has been ordered. Wound care and prevention standing orders have been placed into [facility]. Please re-consult for any additional needs.

## 2023-11-03 NOTE — CONSULTS
[facility]                                                                                             Nephrology Initial Consult Note    Patient Identification:  Name: Juana Hall MRN: [id number]  Age: 63 y.o. : 1960  Sex: female  Date:11/3/2023    Requesting Physician: As per consult order.  Reason for Consultation: ESRD management  Information from:patient/ family/ chart      History of Present Illness: This is a 63 y.o. year old female with end-stage renal disease, previously on home hemodialysis and now on in center dialysis .  History of metastatic breast cancer on immunotherapy.  Recent hospital admission for bowel obstruction.  She presented to the ER last night from the dialysis center with low blood pressure and low heart rate.  She has been persistently hypotensive here despite midodrine and blood pressure is still 68/45 after a 500 cc normal saline bolus.  Patient was approached by palliative care last admission and adamantly refused to speak with them.    The following medical history and medications personally reviewed by me:    Problem List:     Hypotension    Diabetes type 2, controlled    Acquired hypothyroidism    Primary malignant neoplasm of breast with metastasis    Anxiety and depression    Anemia, chronic disease    Morbid obesity with BMI of 50.0-59.9, adult    ESRD (end stage renal disease)    Severe aortic stenosis    Combined systolic and diastolic congestive heart failure      Past Medical History:  Past Medical History:   Diagnosis Date    Anemia     Anxiety and depression     Bicuspid aortic valve     had surgery    Breast cancer     RIGHT, HAD NEELA. 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    History of sepsis 2010    KIDNEY STONE    History of transfusion     no reaction    Hyperlipidemia     Hyperthyroidism     Hypothyroidism     Kidney stone     CURRENT LEFT-DEC 2021    Lymphedema of leg     LEFT    Metastasis from breast cancer 2019    STOMACH-OMENTUM    Neuromuscular disorder     Obesity     ANDREW on CPAP     CPAP    Osteoarthrosis     Peripheral neuropathy     FEET BILAT    Radiculopathy     Rectal bleeding 08/16/2022    Thickened endometrium     Urinary incontinence     wears pads    Weakness     BILAT LEGS-WITH ANY AMT OF TIME       Past Surgical History:  Past Surgical History:   Procedure Laterality Date    AORTIC VALVULOPLASTY  01/2023    ARTERIOVENOUS FISTULA/SHUNT SURGERY Left 11/03/2021    Procedure: LEFT  BRACHIOCEPALIC ARTERIOVENOUS FISTULA;  Surgeon: Dejuan Adler MD;  Location: [facility];  Service: Vascular;  Laterality: Left;    BREAST RECONSTRUCTION  2004    WITH IMPLANTS, AND REVISION, NOW REMOVED    BREAST SURGERY Bilateral 2004    breast implants removed and then replaced due to infection    CARDIAC CATHETERIZATION N/A 08/23/2022    Procedure: CORONARY ANGIOGRAPHY;  Surgeon: Luis Rivers MD;  Location: [facility] CATH INVASIVE LOCATION;  Service: Cardiology;  Laterality: N/A;    CARDIAC CATHETERIZATION N/A 08/23/2022    Procedure: Right Heart Cath;  Surgeon: Luis Rivers MD;  Location: [facility] CATH INVASIVE LOCATION;  Service: Cardiology;  Laterality: N/A;    CARDIAC CATHETERIZATION N/A 01/10/2023    Procedure: Valvuloplasty;  Surgeon: Luis Rivers MD;  Location:   [facility] CATH INVASIVE LOCATION;  Service: Cardiovascular;  Laterality: N/A;  01/10/2023    CARDIAC CATHETERIZATION N/A 01/10/2023    Procedure: Aortic root aortogram;  Surgeon: Luis Rivers MD;  Location: [facility] CATH INVASIVE LOCATION;  Service: Cardiovascular;  Laterality: N/A;    CATARACT EXTRACTION WITH INTRAOCULAR LENS IMPLANT Bilateral      SECTION  1987     SECTION  1987    CYSTOSCOPY W/ URETERAL STENT PLACEMENT      CYSTOSCOPY W/ URETERAL STENT PLACEMENT Left 2021    Procedure: CYSTOSCOPY KEFT RETROGRADE PYELOGRAM  LEFT  URETERAL STENT PLACEMENT;  Surgeon: Leodan Mckee Jr., MD;  Location: [facility] MAIN OR;  Service: Urology;  Laterality: Left;    CYSTOSCOPY W/ URETERAL STENT PLACEMENT Left 03/10/2022    Procedure: CYSTOSCOPY AND LEFT URETERAL STENT EXCHANGE, RETROGRADE PYELOGRAM;  Surgeon: Leodan Mckee Jr., MD;  Location: [facility] MAIN OR;  Service: Urology;  Laterality: Left;    CYSTOSCOPY W/ URETERAL STENT PLACEMENT Left 2023    Procedure: CYSTOSCOPY WITH LEFT URETERAL STENT PLACEMENT, RETROGRADE PYELOGRAM, CLOT EVACUATION, BLADDER FULGARATION;  Surgeon: Leodan Mckee Jr., MD;  Location: [facility] MAIN OR;  Service: Urology;  Laterality: Left;    D & C HYSTEROSCOPY N/A 2017    Procedure: DILATATION AND CURETTAGE, HYSTEROSCOPY ;  Surgeon: Cherie Oliveros MD;  Location: [facility] OR [facility];  Service:     D & C HYSTEROSCOPY N/A 2019    Procedure: DILATATION AND CURETTAGE HYSTEROSCOPY/POLYPECTOMY;  Surgeon: Cherie Oliveros MD;  Location: [facility];  Service: Gynecology    D & C HYSTEROSCOPY ENDOMETRIAL ABLATION N/A 2023    Procedure: DILATATION AND CURETTAGE;  Surgeon: Ina Marcos MD;  Location: [facility] MAIN OR;  Service: Obstetrics/Gynecology;  Laterality: N/A;    ENDOSCOPY      INSERTION AND REMOVAL HEMODIALYSIS CATHETER Right 2021    INSERTION HEMODIALYSIS CATHETER Right 2021    Procedure: HEMODIALYSIS  CATHETER INSERTION;  Surgeon: Clint Hernández MD;  Location: [facility] OR;  Service: Vascular;  Laterality: Right;    LYMPH NODE BIOPSY      MASTECTOMY Bilateral 2004    VENOUS ACCESS DEVICE (PORT) INSERTION AND REMOVAL          Home Meds:   Medications Prior to Admission   Medication Sig Dispense Refill Last Dose    Abemaciclib (Verzenio) 100 MG tablet Take 1 tablet by mouth 2 (Two) Times a Day. 56 tablet 5 11/1/2023    acetaminophen (TYLENOL) 500 MG tablet Take 2 tablets by mouth As Needed for Mild Pain.       atorvastatin (LIPITOR) 40 MG tablet TAKE ONE TABLET BY MOUTH ONCE NIGHTLY (Patient taking differently: Take 1 tablet by mouth Every Night.) 30 tablet 0 11/1/2023    folic acid (FOLVITE) 1 MG tablet TAKE 1 TABLET BY MOUTH DAILY (Patient taking differently: Take 1 tablet by mouth Daily.) 30 tablet 2 11/1/2023    Fulvestrant (FASLODEX) 250 MG/5ML chemo syringe Inject 0.02 mL into the appropriate muscle as directed by prescriber Every 30 (Thirty) Days. In MD office  Next due: 10/17/23   Past Month    gabapentin (Neurontin) 300 MG capsule Take 1 capsule by mouth Every Night. 3 capsule 0 11/2/2023    HYDROcodone-acetaminophen (NORCO) 5-325 MG per tablet Take 1 tablet by mouth Every 4 (Four) Hours As Needed for Moderate Pain. 18 tablet 0     insulin lispro (HUMALOG/ADMELOG) 100 UNIT/ML injection Inject 2-9 Units under the skin into the appropriate area as directed 4 (Four) Times a Day With Meals & at Bedtime.   11/1/2023    levothyroxine (SYNTHROID, LEVOTHROID) 50 MCG tablet Take 1 tablet by mouth Daily. (Patient taking differently: Take 1 tablet by mouth Every Morning.) 90 tablet 1 11/1/2023    Menthol-Zinc Oxide 0.44-20.6 % ointment Apply 1 application  topically to the appropriate area as directed 2 (Two) Times a Day As Needed (if dressings will not stay in place, apply barrier ointment to gluteal cleft and or inner left thigh).       midodrine (PROAMATINE) 10 MG tablet Take 1 tablet by mouth 3 (Three) Times  a Day Before Meals.       multivitamin (THERAGRAN) tablet tablet Take 1 tablet by mouth Daily.   Past Week    mupirocin (BACTROBAN) 2 % ointment Apply 1 application  topically to the appropriate area as directed Daily. Applied to fistula insertion sites for dialysis       ondansetron (ZOFRAN) 8 MG tablet TAKE ONE TABLET BY MOUTH EVERY 8 HOURS AS NEEDED FOR NAUSEA OR VOMITING (Patient taking differently: Take 1 tablet by mouth Every 8 (Eight) Hours As Needed.) 18 tablet 2 Past Week    pantoprazole (Protonix) 40 MG EC tablet Take 1 tablet by mouth Daily.   11/2/2023    sertraline (ZOLOFT) 100 MG tablet TAKE 1 AND 1/2 TABLET BY MOUTH DAILY (Patient taking differently: Take 1.5 tablets by mouth Daily.) 135 tablet 0 11/1/2023    vitamin B-12 (CYANOCOBALAMIN) 1000 MCG tablet Take 1 tablet by mouth Daily.   Past Week    diphenoxylate-atropine (LOMOTIL) 2.5-0.025 MG per tablet Take 1 tablet by mouth 4 (Four) Times a Day As Needed for Diarrhea. 30 tablet 0     polyethylene glycol 17 g packet Take 17 g by mouth Daily.       sennosides-docusate (PERICOLACE) 8.6-50 MG per tablet Take 2 tablets by mouth 2 (Two) Times a Day.          Current Meds:   Current Facility-Administered Medications   Medication Dose Route Frequency Provider Last Rate Last Admin    Abemaciclib tablet 100 mg  100 mg Oral BID Mary Rose MD   100 mg at 11/03/23 0928    acetaminophen (TYLENOL) tablet 650 mg  650 mg Oral Q4H PRN Mary Rose MD        atorvastatin (LIPITOR) tablet 40 mg  40 mg Oral Nightly Mary Rose MD   40 mg at 11/03/23 0011    sennosides-docusate (PERICOLACE) 8.6-50 MG per tablet 2 tablet  2 tablet Oral BID Mary Rose MD        And    polyethylene glycol (MIRALAX) packet 17 g  17 g Oral Daily PRN Mary Rose MD        And    bisacodyl (DULCOLAX) EC tablet 5 mg  5 mg Oral Daily PRN Mary Rose MD        And    bisacodyl (DULCOLAX) suppository 10 mg  10 mg Rectal Daily PRN  Mary Rose MD        folic acid (FOLVITE) tablet 1 mg  1 mg Oral Daily Mary Rose MD   1 mg at 23 08    gabapentin (NEURONTIN) capsule 300 mg  300 mg Oral Nightly Mary Rose MD   300 mg at 23 0011    levothyroxine (SYNTHROID, LEVOTHROID) tablet 100 mcg  100 mcg Oral Q AM Mary Rose MD   100 mcg at 23 08    melatonin tablet 3 mg  3 mg Oral Nightly PRN Mary Rose MD        midodrine (PROAMATINE) tablet 10 mg  10 mg Oral TID AC Mary Rose MD   10 mg at 23 08    multivitamin (THERAGRAN) tablet 1 tablet  1 tablet Oral Daily Mary Rose MD   1 tablet at 23 08    ondansetron (ZOFRAN) tablet 4 mg  4 mg Oral Q6H PRN Mary Rose MD        Or    ondansetron (ZOFRAN) injection 4 mg  4 mg Intravenous Q6H PRN Mary Rose MD        pantoprazole (PROTONIX) EC tablet 40 mg  40 mg Oral QAM Mary Rose MD   40 mg at 23 08    sertraline (ZOLOFT) tablet 150 mg  150 mg Oral Daily Mary Rose MD   150 mg at 23 08    sodium chloride 0.9 % flush 10 mL  10 mL Intravenous PRN Vitor Vazquez II, MD        vitamin B-12 (CYANOCOBALAMIN) tablet 1,000 mcg  1,000 mcg Oral Daily Mary Rose MD   1,000 mcg at 23 08       Allergies:  No Known Allergies    Social History:   Social History     Socioeconomic History    Marital status:      Spouse name: Rk   Tobacco Use    Smoking status: Never     Passive exposure: Never    Smokeless tobacco: Never   Vaping Use    Vaping Use: Never used   Substance and Sexual Activity    Alcohol use: No    Drug use: Never    Sexual activity: Yes     Partners: Male     Birth control/protection: Post-menopausal        Family History:  Family History   Problem Relation Age of Onset    Heart attack Mother 72    Coronary artery disease Mother         Mother  from MI at 72    Diabetes Mother     Breast  "cancer Mother     Hyperlipidemia Mother     Arthritis Mother     Cancer Mother     Heart attack Father 74    Aortic aneurysm Father         Father with ruptured thoracic aortic aneurysm    Coronary artery disease Father         Father  from MI at 74    Heart disease Father     Hyperlipidemia Father     Arthritis Father     Heart attack Maternal Grandmother 76    Coronary artery disease Maternal Grandmother         MGM deceeased from MI at age 76    Malig Hyperthermia Neg Hx         Review of Systems: as per HPI, in addition:    General:      Complains of weakness / fatigue,                       No fevers / chills                       no weight loss  HEENT:       no dysphagia / odynophagia  Neck:           normal range of motion, no swelling  Respiratory: no cough / congestion                      No shortness of air                       No wheezing  CV:              No chest pain                       No palpitations  Abdomen/GI: no nausea / vomiting                      No diarrhea / constipation                      No abdominal pain  :             no dysuria / urinary frequency                       No urgency, normal output  Endocrine:   no polyuria / polydipsia,                      No heat or cold intolerance  Skin:           no rashes or skin breakdown   Vascular:   No edema                     No claudication  Psych:        no depression/ anxiety  Neuro:        no focal weakness, no seizures  Musculoskeletal: no joint pain or deformities      Physical Exam:  Vitals:   Temp (24hrs), Av.6 °F (37 °C), Min:98.6 °F (37 °C), Max:98.6 °F (37 °C)    BP (!) 68/49 (BP Location: Right arm, Patient Position: Lying) Comment: post-bolus  Pulse 88   Temp 98.6 °F (37 °C) (Oral)   Resp 18   Ht 170.2 cm (67\")   Wt (!) 157 kg (345 lb 7.4 oz)   LMP  (LMP Unknown)   SpO2 99%   BMI 54.11 kg/m²   Intake/Output:     Intake/Output Summary (Last 24 hours) at 11/3/2023 1148  Last data filed at 11/3/2023 " 0730  Gross per 24 hour   Intake 1000 ml   Output --   Net 1000 ml        Wt Readings from Last 1 Encounters:   11/02/23 2017 (!) 157 kg (345 lb 7.4 oz)   11/02/23 0952 (!) 158 kg (348 lb 1.7 oz)       Exam:    General Appearance:  Awake, alert, oriented x3, no acute distress  Obese, chronically ill-appearing   Head and Face:  Normocephalic, atraumatic, mucus membranes moist, oropharynx clear   Eyes:  No icterus, pupils equal round and reactive to light, extraocular movements intact    ENMT: Moist mucosa, tongue symmetric    Neck: Supple  no jugular venous distention  no thyromegaly   Pulmonary:  Respiratory effort: Normal  Auscultation of lungs: Clear bilaterally  No wheezes  No rhonchi  Good air movement, good expansion   Chest wall:  No tenderness or deformity   Cardiovascular:  Auscultation of the heart: Normal rhythm, no murmurs  Trace edema of bilateral lower extremities, left arm AV fistula good thrill and bruit   Abdomen:  Abdomen: soft, non-tender, normal bowel sounds all four quadrants, no masses   Liver and spleen: no hepatosplenomegaly   Musculoskeletal: Digits and nails: normal  Normal range of motion  No joint swelling or gross deformities    Skin: Skin inspection: color normal, no visible rashes or lesions  Skin palpation: texture, turgor normal, no palpable lesions   Lymphatic:  no cervical lymphadenopathy    Psychiatric: Judgement and insight: normal  Orientation to person place and time: normal  Mood and affect: normal       DATA:  Radiology and Labs:  The following labs independently reviewed by me, additional AM labs ordered  Old records independently reviewed showing ESRD, recent admission for hypotension, bowel obstruction, diarrhea  The following radiologic studies independently viewed by me, findings chest x-ray showed atelectasis small effusion  Interval notes, chart personally reviewed by me.  I have reviewed and summarized old records as detailed above  Plan of care discussed with patient  and family at bedside  New problems include hypotension    Dialysis patient access: Left arm AV fistula    Risk/ complexity of medical care/ medical decision making: High risk, severe hypotension requiring transfer to the ICU  Chronic illness with severe exacerbation or progression      Labs:   Recent Results (from the past 24 hour(s))   TSH    Collection Time: 11/02/23 12:25 PM    Specimen: Blood   Result Value Ref Range    TSH 6.080 (H) 0.270 - 4.200 uIU/mL   T4, Free    Collection Time: 11/02/23 12:25 PM    Specimen: Blood   Result Value Ref Range    Free T4 0.78 (L) 0.93 - 1.70 ng/dL   Comprehensive Metabolic Panel    Collection Time: 11/02/23  1:16 PM    Specimen: Blood   Result Value Ref Range    Glucose 156 (H) 65 - 99 mg/dL    BUN 33 (H) 8 - 23 mg/dL    Creatinine 6.52 (H) 0.57 - 1.00 mg/dL    Sodium 133 (L) 136 - 145 mmol/L    Potassium 4.8 3.5 - 5.2 mmol/L    Chloride 95 (L) 98 - 107 mmol/L    CO2 25.7 22.0 - 29.0 mmol/L    Calcium 8.1 (L) 8.6 - 10.5 mg/dL    Total Protein 6.1 6.0 - 8.5 g/dL    Albumin 2.1 (L) 3.5 - 5.2 g/dL    ALT (SGPT) 17 1 - 33 U/L    AST (SGOT) 24 1 - 32 U/L    Alkaline Phosphatase 122 (H) 39 - 117 U/L    Total Bilirubin 0.6 0.0 - 1.2 mg/dL    Globulin 4.0 gm/dL    A/G Ratio 0.5 g/dL    BUN/Creatinine Ratio 5.1 (L) 7.0 - 25.0    Anion Gap 12.3 5.0 - 15.0 mmol/L    eGFR 6.6 (L) >60.0 mL/min/1.73   High Sensitivity Troponin T    Collection Time: 11/02/23  1:16 PM    Specimen: Blood   Result Value Ref Range    HS Troponin T 62 (C) <10 ng/L   High Sensitivity Troponin T 2Hr    Collection Time: 11/02/23  3:20 PM    Specimen: Blood   Result Value Ref Range    HS Troponin T 70 (C) <10 ng/L    Troponin T Delta 8 (C) >=-4 - <+4 ng/L   Basic Metabolic Panel    Collection Time: 11/03/23  6:32 AM    Specimen: Blood   Result Value Ref Range    Glucose 122 (H) 65 - 99 mg/dL    BUN 35 (H) 8 - 23 mg/dL    Creatinine 6.78 (H) 0.57 - 1.00 mg/dL    Sodium 132 (L) 136 - 145 mmol/L    Potassium 5.4 (H)  3.5 - 5.2 mmol/L    Chloride 96 (L) 98 - 107 mmol/L    CO2 23.0 22.0 - 29.0 mmol/L    Calcium 7.8 (L) 8.6 - 10.5 mg/dL    BUN/Creatinine Ratio 5.2 (L) 7.0 - 25.0    Anion Gap 13.0 5.0 - 15.0 mmol/L    eGFR 6.3 (L) >60.0 mL/min/1.73   CBC (No Diff)    Collection Time: 11/03/23  6:32 AM    Specimen: Blood   Result Value Ref Range    WBC 6.89 3.40 - 10.80 10*3/mm3    RBC 2.51 (L) 3.77 - 5.28 10*6/mm3    Hemoglobin 7.7 (L) 12.0 - 15.9 g/dL    Hematocrit 24.2 (L) 34.0 - 46.6 %    MCV 96.4 79.0 - 97.0 fL    MCH 30.7 26.6 - 33.0 pg    MCHC 31.8 31.5 - 35.7 g/dL    RDW 16.4 (H) 12.3 - 15.4 %    RDW-SD 57.6 (H) 37.0 - 54.0 fl    MPV 10.7 6.0 - 12.0 fL    Platelets 217 140 - 450 10*3/mm3   Cortisol    Collection Time: 11/03/23  6:32 AM    Specimen: Blood   Result Value Ref Range    Cortisol 21.10   mcg/dL       Radiology:  Imaging Results (Last 24 Hours)       Procedure Component Value Units Date/Time    XR Chest 1 View [[id number]] Collected: 11/02/23 1413     Updated: 11/02/23 1419    Narrative:      XR CHEST 1 VW-     HISTORY: Female who is 63 years-old, low blood pressure     TECHNIQUE: Frontal view of the chest     COMPARISON: 10/13/2023     FINDINGS: The heart size is borderline. Pulmonary vasculature shows mild  central prominence. Small left basilar atelectasis or infiltrate.  Minimal left pleural effusion. No pneumothorax. No acute osseous  process.       Impression:      Small left basilar atelectasis or infiltrate, minimal left  pleural effusion. Borderline heart size with mild central vascular  prominence     This report was finalized on 11/2/2023 2:14 PM by Dr. Jd Serna M.D on Workstation: [id number]                    ASSESSMENT:   ESRD on dialysis Monday Wednesday Friday.  Sent from the dialysis center due to hypotension  Severe hypotension, acute on chronic, no focal etiology such as an infection for the acute drop, suspect this is related to her progressive malignancy  Hyperkalemia  Acute on chronic  anemia    Hypotension    Diabetes type 2, controlled    Acquired hypothyroidism    Primary malignant neoplasm of breast with metastasis    Anxiety and depression    Anemia, chronic disease    Morbid obesity with BMI of 50.0-59.9, adult    ESRD (end stage renal disease)    Severe aortic stenosis    Combined systolic and diastolic congestive heart failure        DISCUSSION/PLAN:   No improvement in her blood pressure after midodrine and a 500 cc saline bolus  Discussed with BETY Akins, agree with transfer to the ICU for pressors  We will give 1 unit packed red blood cells  She is too unstable for dialysis today for her hyperkalemia  We will give Lokelma x1  Placed on low potassium, renal diet  Continue midodrine 3 times daily    Continue to monitor electrolytes and volume closely  Poor prognosis, palliative care appropriate but patient and her family have been very resistant to this in the past  Agree with oncology evaluation to help with goals of care as well  Will reevaluate in the morning regarding need for dialysis and modality, may need CRRT if she is still this hypotensive    I appreciate the consult request.  Please send me a secure chat message with any nonurgent questions regarding patient care.  For any urgent patient care issues please call my office number below.      Tai Antonio MD  [facility]  Office phone number: [phone]  Answering service phone number: [phone]      11/3/2023        Dictation via Dragon dictation software

## 2023-11-03 NOTE — CONSULTS
CONSULT NOTE    Patient Identification:  Juana Hall  63 y.o.  female  1960  [id number]            Requesting physician: Dr Jorge Loya    Reason for Consultation:  hypotension shock    CC: dizzy hypotensive    History of Present Illness:  Patient is a 63-year-old with medical history of ESRD metastatic breast cancer aortic stenosis diabetes morbid obesity and hypothyroidism after sleep apnea on CPAP therapy aortic stenosis who was admitted to the hospitalist service overnight for hypotension.  Patient denies any chest pain cough fever or chills.  She says that she had been noticing that her blood pressures were low earlier this week had experienced some emesis dizziness.  She denies any febrile illness however.  She was admitted given IV fluids started on her midodrine however blood pressures remained low.  She is being transferred to the ICU for vasopressor medications.    Patient is awake alert.  She gives good history.  Patient's daughter says she has noted that she is slurring her speech and she is done this prior hospitalizations when her blood pressure is low and she is doing it again.  Patient does report chronic baseline weakness in the left lower extremity secondary to lymphedema.    Review of Systems:  As per HPI otherwise a 12 system review of systems negative    Past Medical History:   Diagnosis Date    Anemia     Anxiety and depression     Bicuspid aortic valve     had surgery    Breast cancer 2004    RIGHT, HAD NEELA. 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    History of sepsis 2010    KIDNEY STONE    History of transfusion     no reaction    Hyperlipidemia     Hyperthyroidism     Hypothyroidism     Kidney stone     CURRENT LEFT-DEC 2021    Lymphedema of leg     LEFT    Metastasis from breast cancer 2019    STOMACH-OMENTUM    Neuromuscular disorder     Obesity     ANDREW on CPAP     CPAP    Osteoarthrosis     Peripheral neuropathy     FEET BILAT    Radiculopathy     Rectal bleeding 08/16/2022    Thickened endometrium     Urinary incontinence     wears pads    Weakness     BILAT LEGS-WITH ANY AMT OF TIME       Past Surgical History:   Procedure Laterality Date    AORTIC VALVULOPLASTY  01/2023    ARTERIOVENOUS FISTULA/SHUNT SURGERY Left 11/03/2021    Procedure: LEFT  BRACHIOCEPALIC ARTERIOVENOUS FISTULA;  Surgeon: Dejuan Adler MD;  Location: [facility] MAIN OR;  Service: Vascular;  Laterality: Left;    BREAST RECONSTRUCTION  2004    WITH IMPLANTS, AND REVISION, NOW REMOVED    BREAST SURGERY Bilateral 2004    breast implants removed and then replaced due to infection    CARDIAC CATHETERIZATION N/A 08/23/2022    Procedure: CORONARY ANGIOGRAPHY;  Surgeon: Luis Rivers MD;  Location: [facility] CATH INVASIVE LOCATION;  Service: Cardiology;  Laterality: N/A;    CARDIAC CATHETERIZATION N/A 08/23/2022    Procedure: Right Heart Cath;  Surgeon: Luis Rivers MD;  [facility];  Service: Cardiology;  Laterality: N/A;    CARDIAC CATHETERIZATION N/A 01/10/2023    Procedure: Valvuloplasty;  Surgeon: Luis Rivers MD;  [facility];  Service: Cardiovascular;  Laterality: N/A;  01/10/2023    CARDIAC CATHETERIZATION N/A 01/10/2023    Procedure: Aortic root aortogram;  Surgeon: Luis Rivers MD;  Location: [facility] CATH INVASIVE LOCATION;  Service: Cardiovascular;  Laterality: N/A;    CATARACT EXTRACTION  WITH INTRAOCULAR LENS IMPLANT Bilateral      SECTION  1987     SECTION  1987    CYSTOSCOPY W/ URETERAL STENT PLACEMENT  2010    CYSTOSCOPY W/ URETERAL STENT PLACEMENT Left 2021    Procedure: CYSTOSCOPY KEFT RETROGRADE PYELOGRAM  LEFT  URETERAL STENT PLACEMENT;  Surgeon: Leodan Mckee Jr., MD;  Location: [facility] MAIN OR;  Service: Urology;  Laterality: Left;    CYSTOSCOPY W/ URETERAL STENT PLACEMENT Left 03/10/2022    Procedure: CYSTOSCOPY AND LEFT URETERAL STENT EXCHANGE, RETROGRADE PYELOGRAM;  Surgeon: Leodan Mckee Jr., MD;  Location: [facility] MAIN OR;  Service: Urology;  Laterality: Left;    CYSTOSCOPY W/ URETERAL STENT PLACEMENT Left 2023    Procedure: CYSTOSCOPY WITH LEFT URETERAL STENT PLACEMENT, RETROGRADE PYELOGRAM, CLOT EVACUATION, BLADDER FULGARATION;  Surgeon: Leodan Mckee Jr., MD;  Location: [facility] MAIN OR;  Service: Urology;  Laterality: Left;    D & C HYSTEROSCOPY N/A 2017    Procedure: DILATATION AND CURETTAGE, HYSTEROSCOPY ;  Surgeon: Cherie Oliveros MD;  [facility];  Service:     D & C HYSTEROSCOPY N/A 2019    Procedure: DILATATION AND CURETTAGE HYSTEROSCOPY/POLYPECTOMY;  Surgeon: Cherie Oliveros MD;  [facility];  Service: Gynecology    D & C HYSTEROSCOPY ENDOMETRIAL ABLATION N/A 2023    Procedure: DILATATION AND CURETTAGE;  Surgeon: Ina Mracos MD;  Location: [facility] MAIN OR;  Service: Obstetrics/Gynecology;  Laterality: N/A;    ENDOSCOPY      INSERTION AND REMOVAL HEMODIALYSIS CATHETER Right 2021    INSERTION HEMODIALYSIS CATHETER Right 2021    Procedure: HEMODIALYSIS CATHETER INSERTION;  Surgeon: Clint Hernández MD;  Location: [facility] MAIN OR;  Service: Vascular;  Laterality: Right;    LYMPH NODE BIOPSY      MASTECTOMY Bilateral     VENOUS ACCESS DEVICE (PORT) INSERTION AND REMOVAL          Medications Prior to Admission   Medication Sig Dispense Refill Last Dose     Abemaciclib (Verzenio) 100 MG tablet Take 1 tablet by mouth 2 (Two) Times a Day. 56 tablet 5 11/1/2023    acetaminophen (TYLENOL) 500 MG tablet Take 2 tablets by mouth As Needed for Mild Pain.       atorvastatin (LIPITOR) 40 MG tablet TAKE ONE TABLET BY MOUTH ONCE NIGHTLY (Patient taking differently: Take 1 tablet by mouth Every Night.) 30 tablet 0 11/1/2023    folic acid (FOLVITE) 1 MG tablet TAKE 1 TABLET BY MOUTH DAILY (Patient taking differently: Take 1 tablet by mouth Daily.) 30 tablet 2 11/1/2023    Fulvestrant (FASLODEX) 250 MG/5ML chemo syringe Inject 0.02 mL into the appropriate muscle as directed by prescriber Every 30 (Thirty) Days. In MD office  Next due: 10/17/23   Past Month    gabapentin (Neurontin) 300 MG capsule Take 1 capsule by mouth Every Night. 3 capsule 0 11/2/2023    HYDROcodone-acetaminophen (NORCO) 5-325 MG per tablet Take 1 tablet by mouth Every 4 (Four) Hours As Needed for Moderate Pain. 18 tablet 0     insulin lispro (HUMALOG/ADMELOG) 100 UNIT/ML injection Inject 2-9 Units under the skin into the appropriate area as directed 4 (Four) Times a Day With Meals & at Bedtime.   11/1/2023    levothyroxine (SYNTHROID, LEVOTHROID) 50 MCG tablet Take 1 tablet by mouth Daily. (Patient taking differently: Take 1 tablet by mouth Every Morning.) 90 tablet 1 11/1/2023    Menthol-Zinc Oxide 0.44-20.6 % ointment Apply 1 application  topically to the appropriate area as directed 2 (Two) Times a Day As Needed (if dressings will not stay in place, apply barrier ointment to gluteal cleft and or inner left thigh).       midodrine (PROAMATINE) 10 MG tablet Take 1 tablet by mouth 3 (Three) Times a Day Before Meals.       multivitamin (THERAGRAN) tablet tablet Take 1 tablet by mouth Daily.   Past Week    mupirocin (BACTROBAN) 2 % ointment Apply 1 application  topically to the appropriate area as directed Daily. Applied to fistula insertion sites for dialysis       ondansetron (ZOFRAN) 8 MG tablet TAKE ONE TABLET  BY MOUTH EVERY 8 HOURS AS NEEDED FOR NAUSEA OR VOMITING (Patient taking differently: Take 1 tablet by mouth Every 8 (Eight) Hours As Needed.) 18 tablet 2 Past Week    pantoprazole (Protonix) 40 MG EC tablet Take 1 tablet by mouth Daily.   2023    sertraline (ZOLOFT) 100 MG tablet TAKE 1 AND 1/2 TABLET BY MOUTH DAILY (Patient taking differently: Take 1.5 tablets by mouth Daily.) 135 tablet 0 2023    vitamin B-12 (CYANOCOBALAMIN) 1000 MCG tablet Take 1 tablet by mouth Daily.   Past Week    diphenoxylate-atropine (LOMOTIL) 2.5-0.025 MG per tablet Take 1 tablet by mouth 4 (Four) Times a Day As Needed for Diarrhea. 30 tablet 0     polyethylene glycol 17 g packet Take 17 g by mouth Daily.       sennosides-docusate (PERICOLACE) 8.6-50 MG per tablet Take 2 tablets by mouth 2 (Two) Times a Day.          No Known Allergies    Social History     Socioeconomic History    Marital status:      Spouse name: Rk   Tobacco Use    Smoking status: Never     Passive exposure: Never    Smokeless tobacco: Never   Vaping Use    Vaping Use: Never used   Substance and Sexual Activity    Alcohol use: No    Drug use: Never    Sexual activity: Yes     Partners: Male     Birth control/protection: Post-menopausal       Family History   Problem Relation Age of Onset    Heart attack Mother 72    Coronary artery disease Mother         Mother  from MI at 72    Diabetes Mother     Breast cancer Mother     Hyperlipidemia Mother     Arthritis Mother     Cancer Mother     Heart attack Father 74    Aortic aneurysm Father         Father with ruptured thoracic aortic aneurysm    Coronary artery disease Father         Father  from MI at 74    Heart disease Father     Hyperlipidemia Father     Arthritis Father     Heart attack Maternal Grandmother 76    Coronary artery disease Maternal Grandmother         MGM deceeased from MI at age 76    Malig Hyperthermia Neg Hx        Physical Exam:  BP (!) 68/49 (BP Location: Right  "arm, Patient Position: Lying) Comment: post-bolus  Pulse 88   Temp 98.6 °F (37 °C) (Oral)   Resp 18   Ht 170.2 cm (67\")   Wt (!) 157 kg (345 lb 7.4 oz)   LMP  (LMP Unknown)   SpO2 99%   BMI 54.11 kg/m²   Body mass index is 54.11 kg/m².   General appearance: awake, conversant   Eyes: Anicteric sclerae, moist conjunctivae; no lid lag;   HENT: Atraumatic; oropharynx clear with moist mucous membranes Mallampati 4  Neck: Trachea midline; large neck circumference limiting exam  Lungs: Distant given body habitus, with normal respiratory effort and no intercostal retractions  CV: Distant given body habitus RRR, no rub +ALEJANDRO strong  Abdomen: Soft, nontender; obese bowel sounds positive  Extremities: No peripheral edema or extremity lymphadenopathy  Skin: Warm dry well-perfused no diffuse visible rash  Psych: Appropriate affect, alert  Neuro cranials 2 through grossly intact speech intact moves all extremities    LABS:  Results from last 7 days   Lab Units 11/03/23  0632 11/02/23  1144   WBC 10*3/mm3 6.89 6.66   HEMOGLOBIN g/dL 7.7* 8.3*   PLATELETS 10*3/mm3 217 235     Results from last 7 days   Lab Units 11/03/23  0632 11/02/23  1316   SODIUM mmol/L 132* 133*   POTASSIUM mmol/L 5.4* 4.8   CHLORIDE mmol/L 96* 95*   CO2 mmol/L 23.0 25.7   BUN mg/dL 35* 33*   CREATININE mg/dL 6.78* 6.52*   GLUCOSE mg/dL 122* 156*   CALCIUM mg/dL 7.8* 8.1*   Estimated Creatinine Clearance: 13 mL/min (A) (by C-G formula based on SCr of 6.78 mg/dL (H)).    Imaging: I personally visualized the images of scans/x-rays performed within last 3 days.  Imaging Results (Most Recent)       Procedure Component Value Units Date/Time    XR Chest 1 View [[id number]] Collected: 11/02/23 1413     Updated: 11/02/23 1419    Narrative:      XR CHEST 1 VW-     HISTORY: Female who is 63 years-old, low blood pressure     TECHNIQUE: Frontal view of the chest     COMPARISON: 10/13/2023     FINDINGS: The heart size is borderline. Pulmonary vasculature shows " mild  central prominence. Small left basilar atelectasis or infiltrate.  Minimal left pleural effusion. No pneumothorax. No acute osseous  process.       Impression:      Small left basilar atelectasis or infiltrate, minimal left  pleural effusion. Borderline heart size with mild central vascular  prominence     This report was finalized on 11/2/2023 2:14 PM by Dr. Jd Serna M.D on Workstation: [id number]             Last transthoracic echocardiogram 10/15/2023 remarkable for EF of 58% with severe aortic valve stenosis aortic valve area 0.9 and mean pressure gradient of 48      Assessment / Recommendations:  ESRD on HD  Anemia  Hyponatremia  Mild hyperkalemia  Hypotension  Nausea vomiting  Metastatic breast cancer  Severe aortic stenosis with history of balloon valvuloplasty  Morbid obesity  Type 2 diabetes  Anxiety depression  Peripheral neuropathy  Hypothyroidism  Psoriasis  Chronic diastolic heart failure      Phenylephrine  Bolus 500cc if PRBCs do not help with blood pressure  Unit of blood-PRBC  No leukocytosis no fever  Midodrine  We will need to evaluate accuracy of blood pressure.  It is on 1 arm distally cuff size not that great.  If does not respond to above may need to proceed with arterial line to ensure accuracy of blood pressure - pt is awake with great mentation despite low bp readings.    Neurology consult for slurring of speech discussed at bedside.    TSH slightly elevated dose increased from 50 at home to 100 here.  Very minimally elevated not a causative etiology.    Transfer stat to critical care unit.    Total critical care time was 60 minutes, excluding any separately billable procedure time.  Time did not overlap with any other provider.        Pedro Luis Sykes MD  [facility]  11/03/23  12:24 EDT

## 2023-11-03 NOTE — CONSULTS
Neurology Consult Note    Consult Date: 11/3/2023    Referring MD: Mary Rose, *    Reason for Consult I have been asked to see the patient in neurological consultation to render advice and opinion regarding dysarthria.    Juana Hall is a 63 y.o. white female with known diagnosis of end-stage renal disease on hemodialysis, CHF, type 2 diabetes, hypertension, mixed hyperlipidemia, hypothyroidism presented to the hospital yesterday from dialysis center stated that during her treatment she had bradycardia and hypotension, her blood pressure was running low on this admission 50s to 70s systolic, she is afebrile with normal white blood cell count, neurology consulted for dysarthria.  Patient stated that she noticed slurred speech 2 weeks ago, denied any other focal neurological symptoms.  At baseline she uses a rollator to help with walking due to bilateral lower extremity lymphedema, she also stated that she does have a chronic left lower extremity weakness but she denied any worsening of her weakness.  Her labs showed elevated TSH at 6.08, low T4 at 0.78.    Past Medical History:   Diagnosis Date    Anemia     Anxiety and depression     Bicuspid aortic valve     had surgery    Breast cancer 2004    RIGHT, HAD NEELA. 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    History of sepsis 2010    KIDNEY STONE    History of transfusion     no reaction    Hyperlipidemia     Hyperthyroidism      "Hypothyroidism     Kidney stone     CURRENT LEFT-DEC 2021    Lymphedema of leg     LEFT    Metastasis from breast cancer 2019    STOMACH-OMENTUM    Neuromuscular disorder     Obesity     ANDREW on CPAP     CPAP    Osteoarthrosis     Peripheral neuropathy     FEET BILAT    Radiculopathy     Rectal bleeding 08/16/2022    Thickened endometrium     Urinary incontinence     wears pads    Weakness     BILAT LEGS-WITH ANY AMT OF TIME       Exam  BP (!) 70/50   Pulse 104   Temp 98.6 °F (37 °C) (Oral)   Resp 18   Ht 170.2 cm (67\")   Wt (!) 157 kg (345 lb 7.4 oz)   LMP  (LMP Unknown)   SpO2 90%   BMI 54.11 kg/m²   Gen: NAD, vitals reviewed  MS: oriented x3, recent/remote memory intact, normal attention/concentration, language intact, no neglect.  CN: visual acuity grossly normal, PERRL, EOMI, no facial droop, mild dysarthria  Motor: 5/5 throughout upper and lower extremities, 3/5 on the lower extremities baseline as per the patient, normal tone  Sensory exam: Normal to light touch throughout    NIH Stroke Scale :    (0_) 1a. Level of consciousness (LOC): 0=alert;1=arousable by minor stimulation;2=obtunded or needs strong stimulation to attend;3=unresponsive or reflex responses only  (0_) 1b. LOC Questions: 0=answers both;1=answers one;2=answers neither  (0_) 1c. LOC Commands: 0=performs both tasks;1=performs one task;2=performs neither task  (0_) 2. Best Gaze: 0=normal;1=partial gaze palsy;2=forced deviation or total gaze paresis  (0_) 3. Visual: 0=normal;1=partial hemianopia;2=complete hemianopia;3=blind  (0_) 4. Facial palsy: 0=normal;1=minor paresis;2=partial paralysis;3=complete paralysis  FOR 5 AND 6 BELOW: 0=normal;1=drifts but maintains in air;2=unable to maintain in air;3=moves but unable to lift against gravity;4=no movement  (0_)0 (_)1 (_)2 (_)3 (_)4 (_)NA 5a. Motor arm-left  (0_)0 (_)1 (_)2 (_)3 (_)4 (_)NA 5b. Motor arm-right  (0_)0 (_)1 (_)2 (_)3 (_)4 (_)NA 6a. Motor leg-left  (0_)0 (_)1 (_)2 (_)3 (_)4 (_)NA " 6ba. Motor leg-right  (0_) 7. Limb ataxia:0=absent;1=unilateral;2=bilateral; NA=unable to test  (0_) 8. Sensory: 0=normal;1=mild-moderate loss;2-severe or total loss  (0_) 9. Best language: 0=normal;1=mild-moderate aphasia, some deficits apparent but able to communicate;2=severe aphasia, fragmentary expression only, unable to communicate well;3=global aphasia, mute and no comprehension  (1_)10. Dysarthria: 0=normal;1=mild-moderate, slurs some words;2=severe, speech mostly unintelligible; NA=unable to test (e.g.,intubation)  (0_)11. Extinction/Inattention: 0=normal;1=visual,tactile,auditory or other extinction to bilateral simultaneous stimulation, but no severe neglect;2=answers neither      NIH Score: Complete  1      DATA:    Lab Results   Component Value Date    GLUCOSE 122 (H) 11/03/2023    CALCIUM 7.8 (L) 11/03/2023     (L) 11/03/2023    K 5.4 (H) 11/03/2023    CO2 23.0 11/03/2023    CL 96 (L) 11/03/2023    BUN 35 (H) 11/03/2023    CREATININE 6.78 (H) 11/03/2023    EGFRIFAFRI  01/29/2022      Comment:      <15 Indicative of kidney failure.    EGFRIFNONA 11 (L) 02/16/2022    BCR 5.2 (L) 11/03/2023    ANIONGAP 13.0 11/03/2023     Lab Results   Component Value Date    WBC 6.89 11/03/2023    HGB 7.7 (L) 11/03/2023    HCT 24.2 (L) 11/03/2023    MCV 96.4 11/03/2023     11/03/2023       Lab review: WBC 6.8, hemoglobin low at 7.7, BUN 35, creatinine 6.78, sodium 132, potassium 5.4, TSH 6.08, T40.78    Imaging review: Personally reviewed CT scan of the head done on October 13, 2023 which showed no acute abnormality other than small vessel disease.    Diagnoses:  Mild dysarthria suspect related to metabolic encephalopathy and hypothyroidism, rule out stroke although felt to be less likely    PLAN:   Get MRI brain without contrast if negative no further stroke work-up needed.    -Neurology will follow on the MRI for further recommendations, discussed with ICU attending, patient and family.  -Use Ativan 1 mg  IV 10 minutes prior to the scan    Medical Decision Making for this neurology consultation consists of the following:  Review of previous chart, including H/P, provider and nursing notes as applicable.  Review of medications and vital signs.  Review of previous labs, neuroimaging, and additional relevant diagnostics.   Interpretation of laboratory, imaging, and other diagnostic results  Discussion with other providers and family   Total face-to-face/floor time: 60 minutes.

## 2023-11-03 NOTE — CASE MANAGEMENT/SOCIAL WORK
Discharge Planning Assessment  [facility]     Patient Name: Juana Hall  MRN: [id number]  Today's Date: 11/3/2023    Admit Date: 11/2/2023    Plan: [facility]. Pt is on HD so would need HD chair setup onsite. Will need pre cert.       Discharge Plan       Row Name 11/03/23 1226       Plan    Plan Referrals [facility]. Pt is on HD so would need HD chair setup onsite. Will need pre cert.    Patient/Family in Agreement with Plan yes    Plan Comments Met with pt and family at bedside to discuss D/C plan. Pt came to [facility] from [facility] and was receiving HD onsite. Pt and family state they do not want her to return. States spouse is out looking and [facility] now. [facility]. Both have onsite HD and pt will need HD chair setup prior to D/C. Will need to follow up with spouse regarding which SNF they want 1st. Will need pre cert. Pan Cerrato RN-BC                  Continued Care and Services - Admitted Since 11/2/2023       Destination       Service Provider Request Status Selected Services Address Phone Fax Patient Preferred    [facility] Accepted N/A [id number] [facility] [address] [phone] --    [facility] Pending - Request Sent N/A [id number] [address] [phone] --    [facility] Pending - Request Sent N/A [address] [facility] [address] [phone] --                  Selected Continued Care - Episodes Includes continued care and service providers with selected services from the active episodes listed below      Oncology- External Fill Episode start date: 11/15/2021   There are no active outsourced providers for this episode.                 Selected Continued Care - Prior Encounters Includes continued care and service providers with selected  services from prior encounters from 8/4/2023 to 11/3/2023      Discharged on 10/26/2023 Admission date: 10/13/2023 - Discharge disposition: Skilled Nursing Facility (DC - External)      Destination       Service Provider Selected Services Address Phone Fax Patient Preferred    [facility] [facility] [address] [phone] --                          Expected Discharge Date and Time       Expected Discharge Date Expected Discharge Time    Nov 7, 2023           Pan Cerrato RN

## 2023-11-03 NOTE — H&P
HISTORY AND PHYSICAL   [facility]        Date of Admission: 2023  Patient Identification:  Name: Juana Hall  Age: 63 y.o.  Sex: female  :  1960  MRN: [id number]                     Primary Care Physician: Kiana Noriega), BETY    Chief Complaint: 63 year old female who presented to the emergency room from her dialysis center with low blood pressure and heart rate; none were noted by ems during transport but she remained somewhat hypotensive in the ED: she denies dizziness or weakness; no chest pain or shortness of breath     History of Present Illness:   As above    Past Medical History:  Past Medical History:   Diagnosis Date    Anemia     Anxiety and depression     Bicuspid aortic valve     had surgery    Breast cancer     RIGHT, HAD NEELA. MASTECTOMY, CHEMO AND RADIATION    CHF (congestive heart failure)     CKD (chronic kidney disease)     dialysis    COVID 2023    Diabetes mellitus     Dialysis patient     pt does at home    Elevated cholesterol     Frequent UTI     last 6 months    Heart murmur     History of cancer chemotherapy     History of hypertension     due to low b/p was taken off meds    History of kidney stones     History of MRSA infection     POST BREAST SURGERY-TREATED [facility]    History of radiation therapy     2 times [id number] for breast cancer   and  for omentum cancer    History of sepsis     KIDNEY STONE    History of transfusion     no reaction    Hyperlipidemia     Hyperthyroidism     Hypothyroidism     Kidney stone     CURRENT LEFT-DEC 2021    Lymphedema of leg     LEFT    Metastasis from breast cancer     STOMACH-OMENTUM    Neuromuscular disorder     Obesity     ANDREW on CPAP     CPAP    Osteoarthrosis     Peripheral neuropathy     FEET BILAT    Radiculopathy     Rectal bleeding 2022    Thickened endometrium     Urinary incontinence     wears pads    Weakness     BILAT LEGS-WITH ANY AMT OF TIME     Past Surgical  History:  Past Surgical History:   Procedure Laterality Date    AORTIC VALVULOPLASTY  2023    ARTERIOVENOUS FISTULA/SHUNT SURGERY Left 2021    Procedure: LEFT  BRACHIOCEPALIC ARTERIOVENOUS FISTULA;  Surgeon: Dejuan Adler MD;  Location: [facility] MAIN OR;  Service: Vascular;  Laterality: Left;    BREAST RECONSTRUCTION      WITH IMPLANTS, AND REVISION, NOW REMOVED    BREAST SURGERY Bilateral 2004    breast implants removed and then replaced due to infection    CARDIAC CATHETERIZATION N/A 2022    Procedure: CORONARY ANGIOGRAPHY;  Surgeon: Luis Rivers MD;  Location: [facility] CATH INVASIVE LOCATION;  Service: Cardiology;  Laterality: N/A;    CARDIAC CATHETERIZATION N/A 2022    Procedure: Right Heart Cath;  Surgeon: Luis Rivers MD;  Location: [facility] CATH [facility]ASIVE LOCATION;  Service: Cardiology;  Laterality: N/A;    CARDIAC CATHETERIZATION N/A 01/10/2023    Procedure: Valvuloplasty;  Surgeon: Luis Rivers MD;  Location:  [facility] CATH INVASIVE LOCATION;  Service: Cardiovascular;  Laterality: N/A;  01/10/2023    CARDIAC CATHETERIZATION N/A 01/10/2023    Procedure: Aortic root aortogram;  Surgeon: Luis Rivers MD;  Location:  [facility] CATH INVASIVE LOCATION;  Service: Cardiovascular;  Laterality: N/A;    CATARACT EXTRACTION WITH INTRAOCULAR LENS IMPLANT Bilateral      SECTION  1987     SECTION  1987    CYSTOSCOPY W/ URETERAL STENT PLACEMENT  2010    CYSTOSCOPY W/ URETERAL STENT PLACEMENT Left 2021    Procedure: CYSTOSCOPY KEFT RETROGRADE PYELOGRAM  LEFT  URETERAL STENT PLACEMENT;  Surgeon: Leodan Mckee Jr., MD;  Location: [facility] MAIN OR;  Service: Urology;  Laterality: Left;    CYSTOSCOPY W/ URETERAL STENT PLACEMENT Left 03/10/2022    Procedure: CYSTOSCOPY AND LEFT URETERAL STENT EXCHANGE, RETROGRADE PYELOGRAM;  Surgeon: Leodan Mckee Jr., MD;  Location: [facility] MAIN OR;  Service: Urology;  Laterality:  Left;    CYSTOSCOPY W/ URETERAL STENT PLACEMENT Left 5/5/2023    Procedure: CYSTOSCOPY WITH LEFT URETERAL STENT PLACEMENT, RETROGRADE PYELOGRAM, CLOT EVACUATION, BLADDER FULGARATION;  Surgeon: Leodan Mckee Jr., MD;  Location: [facility] MAIN OR;  Service: Urology;  Laterality: Left;    D & C HYSTEROSCOPY N/A 05/22/2017    Procedure: DILATATION AND CURETTAGE, HYSTEROSCOPY ;  Surgeon: Cherie Oliveros MD;  Location:  [facility];  Service:     D & C HYSTEROSCOPY N/A 09/19/2019    Procedure: DILATATION AND CURETTAGE HYSTEROSCOPY/POLYPECTOMY;  Surgeon: Cherie Oliveros MD;  Location:  [facility];  Service: Gynecology    D & C HYSTEROSCOPY ENDOMETRIAL ABLATION N/A 5/5/2023    Procedure: DILATATION AND CURETTAGE;  Surgeon: Ina Marcos MD;  Location: [facility] MAIN OR;  Service: Obstetrics/Gynecology;  Laterality: N/A;    ENDOSCOPY      INSERTION AND REMOVAL HEMODIALYSIS CATHETER Right 05/01/2021    INSERTION HEMODIALYSIS CATHETER Right 05/01/2021    Procedure: HEMODIALYSIS CATHETER INSERTION;  Surgeon: Clint Hernández MD;  Location: [facility] MAIN OR;  Service: Vascular;  Laterality: Right;    LYMPH NODE BIOPSY      MASTECTOMY Bilateral 2004    VENOUS ACCESS DEVICE (PORT) INSERTION AND REMOVAL        Home Meds:  Medications Prior to Admission   Medication Sig Dispense Refill Last Dose    Abemaciclib (Verzenio) 100 MG tablet Take 1 tablet by mouth 2 (Two) Times a Day. 56 tablet 5 11/1/2023    atorvastatin (LIPITOR) 40 MG tablet TAKE ONE TABLET BY MOUTH ONCE NIGHTLY (Patient taking differently: Take 1 tablet by mouth Every Night.) 30 tablet 0 11/1/2023    folic acid (FOLVITE) 1 MG tablet TAKE 1 TABLET BY MOUTH DAILY (Patient taking differently: Take 1 tablet by mouth Daily.) 30 tablet 2 11/1/2023    Fulvestrant (FASLODEX) 250 MG/5ML chemo syringe Inject 0.02 mL into the appropriate muscle as directed by prescriber Every 30 (Thirty) Days. In MD office  Next due: 10/17/23   Past Month    gabapentin (Neurontin) 300 MG  capsule Take 1 capsule by mouth Every Night. 3 capsule 0 11/2/2023    insulin lispro (HUMALOG/ADMELOG) 100 UNIT/ML injection Inject 2-9 Units under the skin into the appropriate area as directed 4 (Four) Times a Day With Meals & at Bedtime.   11/1/2023    levothyroxine (SYNTHROID, LEVOTHROID) 50 MCG tablet Take 1 tablet by mouth Daily. (Patient taking differently: Take 1 tablet by mouth Every Morning.) 90 tablet 1 11/1/2023    Menthol-Zinc Oxide 0.44-20.6 % ointment Apply 1 application  topically to the appropriate area as directed 2 (Two) Times a Day As Needed (if dressings will not stay in place, apply barrier ointment to gluteal cleft and or inner left thigh).       midodrine (PROAMATINE) 10 MG tablet Take 1 tablet by mouth 3 (Three) Times a Day Before Meals.       multivitamin (THERAGRAN) tablet tablet Take 1 tablet by mouth Daily.   Past Week    mupirocin (BACTROBAN) 2 % ointment Apply 1 application  topically to the appropriate area as directed Daily. Applied to fistula insertion sites for dialysis       ondansetron (ZOFRAN) 8 MG tablet TAKE ONE TABLET BY MOUTH EVERY 8 HOURS AS NEEDED FOR NAUSEA OR VOMITING (Patient taking differently: Take 1 tablet by mouth Every 8 (Eight) Hours As Needed.) 18 tablet 2 Past Week    pantoprazole (Protonix) 40 MG EC tablet Take 1 tablet by mouth Daily.   11/2/2023    sertraline (ZOLOFT) 100 MG tablet TAKE 1 AND 1/2 TABLET BY MOUTH DAILY (Patient taking differently: Take 1.5 tablets by mouth Daily.) 135 tablet 0 11/1/2023    vitamin B-12 (CYANOCOBALAMIN) 1000 MCG tablet Take 1 tablet by mouth Daily.   Past Week    acetaminophen (TYLENOL) 500 MG tablet Take 2 tablets by mouth As Needed for Mild Pain.       diphenoxylate-atropine (LOMOTIL) 2.5-0.025 MG per tablet Take 1 tablet by mouth 4 (Four) Times a Day As Needed for Diarrhea. 30 tablet 0     HYDROcodone-acetaminophen (NORCO) 5-325 MG per tablet Take 1 tablet by mouth Every 4 (Four) Hours As Needed for Moderate Pain. 18 tablet  0     polyethylene glycol 17 g packet Take 17 g by mouth Daily.       sennosides-docusate (PERICOLACE) 8.6-50 MG per tablet Take 2 tablets by mouth 2 (Two) Times a Day.          Allergies:  No Known Allergies  Immunizations:  Immunization History   Administered Date(s) Administered    COVID-19 (MODERNA) 1st,2nd,3rd Dose Monovalent 2021, 2021, 2021, 09/10/2021    COVID-19 (MODERNA) Monovalent Original Booster 2022    Flu Vaccine Quad PF >36MO 10/16/2017    Fluzone (or Fluarix & Flulaval for VFC) >6mos 10/16/2017, 2020, 2020, 10/16/2021    Influenza, Unspecified 2020    PPD Test 2021, 2021, 2022, 2023    Pneumococcal Polysaccharide (PPSV23) 2020    flucelvax quad pfs =>4 YRS 11/15/2022     Social History:   Social History     Social History Narrative    Not on file     Social History     Socioeconomic History    Marital status:      Spouse name: Rk   Tobacco Use    Smoking status: Never     Passive exposure: Never    Smokeless tobacco: Never   Vaping Use    Vaping Use: Never used   Substance and Sexual Activity    Alcohol use: No    Drug use: Never    Sexual activity: Yes     Partners: Male     Birth control/protection: Post-menopausal       Family History:  Family History   Problem Relation Age of Onset    Heart attack Mother 72    Coronary artery disease Mother         Mother  from MI at 72    Diabetes Mother     Breast cancer Mother     Hyperlipidemia Mother     Arthritis Mother     Cancer Mother     Heart attack Father 74    Aortic aneurysm Father         Father with ruptured thoracic aortic aneurysm    Coronary artery disease Father         Father  from MI at 74    Heart disease Father     Hyperlipidemia Father     Arthritis Father     Heart attack Maternal Grandmother 76    Coronary artery disease Maternal Grandmother         MGM deceeased from MI at age 76    Malig Hyperthermia Neg Hx         Review of  "Systems  See history of present illness and past medical history.  Patient denies headache, dizziness, syncope, falls, trauma, change in vision, change in hearing, change in taste, changes in weight, changes in appetite, focal weakness, numbness, or paresthesia.  Patient denies chest pain, palpitations, dyspnea, orthopnea, PND, cough, sinus pressure, rhinorrhea, epistaxis, hemoptysis, nausea, vomiting,hematemesis, diarrhea, constipation or hematochezia.  Denies cold or heat intolerance, polydipsia, polyuria, polyphagia. Denies hematuria, pyuria, dysuria, hesitancy, frequency or urgency. Denies consumption of raw and under cooked meats foods or change in water source.  Denies fever, chills, sweats, night sweats.      Objective:  T Max 24 hrs: Temp (24hrs), Av.2 °F (36.8 °C), Min:97.7 °F (36.5 °C), Max:98.6 °F (37 °C)    Vitals Ranges:   Temp:  [97.7 °F (36.5 °C)-98.6 °F (37 °C)] 98.6 °F (37 °C)  Heart Rate:  [] 94  Resp:  [20] 20  BP: ()/(33-96) 73/50      Exam:  BP (!) 73/50 (BP Location: Right arm, Patient Position: Lying)   Pulse 94   Temp 98.6 °F (37 °C) (Oral)   Resp 20   Ht 170.2 cm (67\")   Wt (!) 157 kg (345 lb 7.4 oz)   LMP  (LMP Unknown)   SpO2 96%   BMI 54.11 kg/m²     General Appearance:    Alert, cooperative, no distress, appears stated age   Head:    Normocephalic, without obvious abnormality, atraumatic   Eyes:    PERRL, conjunctivae/corneas clear, EOM's intact, both eyes   Ears:    Normal external ear canals, both ears   Nose:   Nares normal, septum midline, mucosa normal, no drainage    or sinus tenderness   Throat:   Lips, mucosa, and tongue normal   Neck:   Supple, symmetrical, trachea midline, no adenopathy;     thyroid:  no enlargement/tenderness/nodules; no carotid    bruit or JVD   Back:     Symmetric, no curvature, ROM normal, no CVA tenderness   Lungs:     Decreased breath sounds bilaterally, respirations unlabored   Chest Wall:    No tenderness or deformity    Heart:   "  Regular rate and rhythm, S1 and S2 normal, no murmur, rub   or gallop   Abdomen:     Soft, nontender, bowel sounds active all four quadrants,     no masses, no hepatomegaly, no splenomegaly   Extremities:   Extremities normal, atraumatic, no cyanosis or edema      .    Data Review:  Labs in chart were reviewed.  WBC   Date Value Ref Range Status   11/02/2023 6.66 3.40 - 10.80 10*3/mm3 Final     Hemoglobin   Date Value Ref Range Status   11/02/2023 8.3 (L) 12.0 - 15.9 g/dL Final     Hematocrit   Date Value Ref Range Status   11/02/2023 26.4 (L) 34.0 - 46.6 % Final     Platelets   Date Value Ref Range Status   11/02/2023 235 140 - 450 10*3/mm3 Final     Sodium   Date Value Ref Range Status   11/02/2023 133 (L) 136 - 145 mmol/L Final     Potassium   Date Value Ref Range Status   11/02/2023 4.8 3.5 - 5.2 mmol/L Final     Chloride   Date Value Ref Range Status   11/02/2023 95 (L) 98 - 107 mmol/L Final     CO2   Date Value Ref Range Status   11/02/2023 25.7 22.0 - 29.0 mmol/L Final     BUN   Date Value Ref Range Status   11/02/2023 33 (H) 8 - 23 mg/dL Final     Creatinine   Date Value Ref Range Status   11/02/2023 6.52 (H) 0.57 - 1.00 mg/dL Final     Glucose   Date Value Ref Range Status   11/02/2023 156 (H) 65 - 99 mg/dL Final     Calcium   Date Value Ref Range Status   11/02/2023 8.1 (L) 8.6 - 10.5 mg/dL Final       Results from last 7 days   Lab Units 11/02/23  1225   TSH uIU/mL 6.080*   FREE T4 ng/dL 0.78*          Imaging Results (All)       Procedure Component Value Units Date/Time    XR Chest 1 View [[id number]] Collected: 11/02/23 1413     Updated: 11/02/23 1419    Narrative:      XR CHEST 1 VW-     HISTORY: Female who is 63 years-old, low blood pressure     TECHNIQUE: Frontal view of the chest     COMPARISON: 10/13/2023     FINDINGS: The heart size is borderline. Pulmonary vasculature shows mild  central prominence. Small left basilar atelectasis or infiltrate.  Minimal left pleural effusion. No pneumothorax. No  acute osseous  process.       Impression:      Small left basilar atelectasis or infiltrate, minimal left  pleural effusion. Borderline heart size with mild central vascular  prominence     This report was finalized on 11/2/2023 2:14 PM by Dr. Jd Serna M.D on Workstation: [id number]                 Assessment:  Active Hospital Problems    Diagnosis  POA    **Hypotension [I95.9]  Yes      Resolved Hospital Problems   No resolved problems to display.   Esrd on hd  Anemia  Breast cancer  Diabetes  Obesity   Chf  Hyponatremia  Hypothyroidism  Sleep apnea    Plan:  Will continue midodrine  Monitor on telemetry  Consult nephrology  Accu checks, insulin sliding scale  Troponin is flat  Dw patient and ed provider    Mary Rose MD  11/2/2023  23:19 EDT

## 2023-11-03 NOTE — PLAN OF CARE
Goal Outcome Evaluation:   Admitted from ED a 63 year old female because of hypotension, patient SBP ranging from 50 to 80. Upon arrival here her BP were 73/50 and 77/50 consecutively and her heart rhythm was A-fib ,EKG were done at ED and it showed she is A-fib. Notified MD on call bec of her low BP and ordered to give midodrine 10mg as statt dose. Re checked it at 0300 it was 96/61 now.She is a dialysis patient , did the dialysis yesterday and she tacho down that's the reason she transferred to ed and also found out her BP was also low. No other complaints made, will continue to monitor.

## 2023-11-03 NOTE — CONSULTS
[facility] GROUP INITIAL INPATIENT CONSULTATION NOTE    REASON FOR CONSULTATION:    Metastatic breast cancer    HISTORY OF PRESENT ILLNESS:  Juana Hall is a 63 y.o. female who we are asked to see today in consultation for the above issue.    Patient has past medical history significant for hypertension, hyperlipidemia, hypothyroidism, obstructive sleep apnea, diabetes mellitus, diabetic peripheral neuropathy, nephrolithiasis, aortic stenosis, CHF, ESRD on hemodialysis, anemia secondary to chemotherapy, ESRD.    The patient is well-known to me, followed in the outpatient setting for metastatic breast cancer.  The patient has a long history of breast cancer dating back to her original diagnosis with stage IIIC disease in 2003.  She received neoadjuvant chemotherapy in the [place] system and underwent bilateral mastectomies 1/22/2004 with right axillary dissection with a residual 10 to 12 cm invasive lobular carcinoma of the right breast with 14/16 lymph nodes positive.  She received adjuvant chemotherapy and radiation.  She received tamoxifen adjuvantly with subsequent transition to Femara, stopped adjuvant endocrine therapy in June 2014.  Patient was found to have metastatic disease with carcinomatosis identified on scans in June 2019.  Omental biopsy 7/30/2019 showed metastatic lobular breast cancer that was ER positive, AZ positive, HER2/tj negative.  She was treated initially with Aromasin and Ibrance beginning in August 2019.  Patient experience significant difficulty with myelosuppression from Ibrance and required dose alterations.  She developed progressive disease in November 2021, was continued on Ibrance with transition from Aromasin to Faslodex.  Patient experienced focal disease progression and received radiation therapy to L2 metastasis 5/23 - 6/7/2022 and continued on Ibrance and Faslodex.  Evidence of further progression on PET scan with new right sixth rib lesion and on 7/28/2022  transition from Ibrance to abemaciclib with continuation of Faslodex.  Patient was felt to be a poor candidate for chemotherapy given her underlying ESRD on dialysis and did not wish to pursue chemotherapy.  Patient did require dose adjustment in abemaciclib due to myelosuppression.  Subsequent progression with further palliative radiation to left anterior acetabular lesion in May 2023.  Patient received most recent dose Faslodex (cycle 14-day 1) on 9/6/2023, 500 mg IM injection.  She had been continuing on abemaciclib at 100 mg twice daily.    Patient was hospitalized 9/28 - 9/30/2023 due to ongoing diarrhea.  Initially was felt that she had recurrent C. difficile colitis however only PCR was positive, antigen was negative.  She underwent CT abdomen and pelvis on 9/28/2023 that showed development of a large amount of ascites.  She was discharged prior to undergoing paracentesis.    Patient underwent outpatient paracentesis on 10/9/2023 with 11 L removed.  Cytology showed rare atypical cells with immunophenotype suspicious for metastatic lobular breast cancer, cells were ER/NM negative.    Patient was to have followed up with PET scan however was hospitalized again 10/13 - 10/26/2023.  She was experiencing weakness, dizziness with multiple falls.  She was hypotensive, required vasopressor support and ICU, was found to have UTI and ileus versus small bowel obstruction.  CT abdomen pelvis on 10/19/2023 during hospitalization showed reaccumulation of moderately large volume ascites, significantly thickened appearance of the gastric antrum suggestive of antritis or possibly metastatic disease to the gastric antrum.  Small left pleural effusion and new left lower lobe atelectasis.  She did have ongoing difficulty with anemia that required transfusion support.  Following discharge she was scheduled for outpatient PET scan and follow-up to discuss her progressive disease.    Patient is now admitted, presented on 11/2/2023  with hypotension from dialysis center.  She is requiring pressor support.  There was notation of slurred speech although patient notes that she has had this in the past with hypotension.  She was seen by neurology, was not felt to have strokelike symptoms and MRI has been requested for a.m.  There has been question regarding accuracy of her blood pressure monitoring.  She was anemic with hemoglobin of 7.7 and is receiving 1 unit PRBC.  Cortisol level was 21.1.    Patient is currently awake, alert, oriented.  She has no specific complaints and speech is currently normal.      Past Medical History:   Diagnosis Date    Anemia     Anxiety and depression     Bicuspid aortic valve     had surgery    Breast cancer 2004    RIGHT, HAD NEELA. 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    History of sepsis 2010    KIDNEY STONE    History of transfusion     no reaction    Hyperlipidemia     Hyperthyroidism     Hypothyroidism     Kidney stone     CURRENT LEFT-DEC 2021    Lymphedema of leg     LEFT    Metastasis from breast cancer 2019    STOMACH-OMENTUM    Neuromuscular disorder     Obesity     ANDREW on CPAP     CPAP    Osteoarthrosis     Peripheral neuropathy     FEET BILAT    Radiculopathy     Rectal bleeding 08/16/2022    Thickened endometrium     Urinary incontinence     wears pads    Weakness     BILAT LEGS-WITH ANY AMT OF TIME       Past Surgical History:   Procedure Laterality Date    AORTIC  VALVULOPLASTY  2023    ARTERIOVENOUS FISTULA/SHUNT SURGERY Left 2021    Procedure: LEFT  BRACHIOCEPALIC ARTERIOVENOUS FISTULA;  Surgeon: Dejuan Adler MD;  Location: [facility] MAIN OR;  Service: Vascular;  Laterality: Left;    BREAST RECONSTRUCTION      WITH IMPLANTS, AND REVISION, NOW REMOVED    BREAST SURGERY Bilateral     breast implants removed and then replaced due to infection    CARDIAC CATHETERIZATION N/A 2022    Procedure: CORONARY ANGIOGRAPHY;  Surgeon: Luis Rivers MD;  Location:  [facility];  Service: Cardiology;  Laterality: N/A;    CARDIAC CATHETERIZATION N/A 2022    Procedure: Right Heart Cath;  Surgeon: Luis Rivers MD;  Location:  [facility];  Service: Cardiology;  Laterality: N/A;    CARDIAC CATHETERIZATION N/A 01/10/2023    Procedure: Valvuloplasty;  Surgeon: Luis Rivers MD;  Location:  [facility];  Service: Cardiovascular;  Laterality: N/A;  01/10/2023    CARDIAC CATHETERIZATION N/A 01/10/2023    Procedure: Aortic root aortogram;  Surgeon: Luis Rivers MD;  Location:  [facility];  Service: Cardiovascular;  Laterality: N/A;    CATARACT EXTRACTION WITH INTRAOCULAR LENS IMPLANT Bilateral      SECTION  1987     SECTION  1987    CYSTOSCOPY W/ URETERAL STENT PLACEMENT      CYSTOSCOPY W/ URETERAL STENT PLACEMENT Left 2021    Procedure: CYSTOSCOPY KEFT RETROGRADE PYELOGRAM  LEFT  URETERAL STENT PLACEMENT;  Surgeon: Leodan Mckee Jr., MD;  Location: [facility] MAIN OR;  Service: Urology;  Laterality: Left;    CYSTOSCOPY W/ URETERAL STENT PLACEMENT Left 03/10/2022    Procedure: CYSTOSCOPY AND LEFT URETERAL STENT EXCHANGE, RETROGRADE PYELOGRAM;  Surgeon: Leodan Mckee Jr., MD;  Location: [facility] OR;  Service: Urology;  Laterality: Left;    CYSTOSCOPY W/ URETERAL STENT PLACEMENT Left 2023     Procedure: CYSTOSCOPY WITH LEFT URETERAL STENT PLACEMENT, RETROGRADE PYELOGRAM, CLOT EVACUATION, BLADDER FULGARATION;  Surgeon: Leodan Mckee Jr., MD;  Location: [facility] MAIN OR;  Service: Urology;  Laterality: Left;    D & C HYSTEROSCOPY N/A 05/22/2017    Procedure: DILATATION AND CURETTAGE, HYSTEROSCOPY ;  Surgeon: Cherie Oliveros MD;  [facility];  Service:     D & C HYSTEROSCOPY N/A 09/19/2019    Procedure: DILATATION AND CURETTAGE HYSTEROSCOPY/POLYPECTOMY;  Surgeon: Cherie Oliveros MD;  [facility];  Service: Gynecology    D & C HYSTEROSCOPY ENDOMETRIAL ABLATION N/A 5/5/2023    Procedure: DILATATION AND CURETTAGE;  Surgeon: Ina Marcos MD;  Location: [facility] MAIN OR;  Service: Obstetrics/Gynecology;  Laterality: N/A;    ENDOSCOPY      INSERTION AND REMOVAL HEMODIALYSIS CATHETER Right 05/01/2021    INSERTION HEMODIALYSIS CATHETER Right 05/01/2021    Procedure: HEMODIALYSIS CATHETER INSERTION;  Surgeon: Clint Hernández MD;  Location: [facility] MAIN OR;  Service: Vascular;  Laterality: Right;    LYMPH NODE BIOPSY      MASTECTOMY Bilateral 2004    VENOUS ACCESS DEVICE (PORT) INSERTION AND REMOVAL         SOCIAL HISTORY:   reports that she has never smoked. She has never been exposed to tobacco smoke. She has never used smokeless tobacco. She reports that she does not drink alcohol and does not use drugs.    FAMILY HISTORY:  family history includes Aortic aneurysm in her father; Arthritis in her father and mother; Breast cancer in her mother; Cancer in her mother; Coronary artery disease in her father, maternal grandmother, and mother; Diabetes in her mother; Heart attack (age of onset: 72) in her mother; Heart attack (age of onset: 74) in her father; Heart attack (age of onset: 76) in her maternal grandmother; Heart disease in her father; Hyperlipidemia in her father and mother.    ALLERGIES:  No Known Allergies    MEDICATIONS:  As listed in the electronic medical record.    Review  of Systems  A comprehensive review of systems was obtained with pertinent positive findings as noted in the interval history above.  All other systems negative.    Vitals:    11/03/23 0315 11/03/23 0730 11/03/23 1019 11/03/23 1142   BP: 96/61 (!) 68/49 (!) 65/48 (!) 68/49   BP Location:  Right arm Right arm Right arm   Patient Position:  Lying Lying Lying   Pulse: 92 98  88   Resp:  18     Temp:  98.6 °F (37 °C)     TempSrc:  Oral     SpO2: 92% 99%     Weight:       Height:           Physical Exam  Constitutional:       Appearance: She is well-developed. She is obese.   Eyes:      Conjunctiva/sclera: Conjunctivae normal.   Neck:      Thyroid: No thyromegaly.   Cardiovascular:      Rate and Rhythm: Regular rhythm.      Heart sounds: Murmur heard.      No friction rub. No gallop.      Comments: Borderline tachycardic  Pulmonary:      Effort: No respiratory distress.      Breath sounds: Normal breath sounds.   Abdominal:      General: Bowel sounds are normal. There is no distension.      Palpations: Abdomen is soft.      Tenderness: There is no abdominal tenderness.   Musculoskeletal:         General: Swelling present.      Comments: Trace-1+ bilateral lower extremity edema   Lymphadenopathy:      Head:      Right side of head: No submandibular adenopathy.      Cervical: No cervical adenopathy.      Upper Body:      Right upper body: No supraclavicular adenopathy.      Left upper body: No supraclavicular adenopathy.   Skin:     General: Skin is warm and dry.      Findings: No rash.   Neurological:      Mental Status: She is alert and oriented to person, place, and time.      Cranial Nerves: No cranial nerve deficit.      Motor: No abnormal muscle tone.      Deep Tendon Reflexes: Reflexes normal.   Psychiatric:         Behavior: Behavior normal.         DIAGNOSTIC DATA:    Results from last 7 days   Lab Units 11/03/23  0632 11/02/23  1144   WBC 10*3/mm3 6.89 6.66   HEMOGLOBIN g/dL 7.7* 8.3*   HEMATOCRIT % 24.2* 26.4*    PLATELETS 10*3/mm3 217 235      Results from last 7 days   Lab Units 11/03/23  0632 11/02/23  1316   SODIUM mmol/L 132* 133*   POTASSIUM mmol/L 5.4* 4.8   CHLORIDE mmol/L 96* 95*   CO2 mmol/L 23.0 25.7   BUN mg/dL 35* 33*   CREATININE mg/dL 6.78* 6.52*   CALCIUM mg/dL 7.8* 8.1*   BILIRUBIN mg/dL  --  0.6   ALK PHOS U/L  --  122*   ALT (SGPT) U/L  --  17   AST (SGOT) U/L  --  24   GLUCOSE mg/dL 122* 156*          Assessment & Plan   ASSESSMENT:  This is a 63 y.o. female with:    *Metastatic lobular breast cancer  The patient has a long history of breast cancer dating back to her original diagnosis with stage IIIC disease in 2003.  She received neoadjuvant chemotherapy in the [place] system and underwent bilateral mastectomies 1/22/2004 with right axillary dissection with a residual 10 to 12 cm invasive lobular carcinoma of the right breast with 14/16 lymph nodes positive.  She received adjuvant chemotherapy and radiation.  She received tamoxifen adjuvantly with subsequent transition to Femara, stopped adjuvant endocrine therapy in June 2014.    Patient was found to have metastatic disease with carcinomatosis identified on scans in June 2019.  Omental biopsy 7/30/2019 showed metastatic lobular breast cancer that was ER positive, NM positive, HER2/tj negative.    She was treated initially for metastatic disease with Aromasin and Ibrance beginning in August 2019.  Patient experienced significant difficulty with myelosuppression from Ibrance and required dose alterations.    She developed progressive disease in November 2021, was continued on Ibrance with transition from Aromasin to Faslodex.    Patient experienced focal disease progression and received radiation therapy to L2 metastasis 5/23 - 6/7/2022 and continued on Ibrance and Faslodex.    Evidence of further progression on PET scan with new right sixth rib lesion and on 7/28/2022 transitioned from Ibrance to abemaciclib with continuation of Faslodex.    Patient  was felt to be a poor candidate for chemotherapy given her underlying ESRD on dialysis and did not wish to pursue chemotherapy.    Patient did require dose adjustment in abemaciclib due to myelosuppression.    Subsequent progression with further palliative radiation to left anterior acetabular lesion in May 2023.    Patient received most recent dose Faslodex (cycle 14-day 1) on 9/6/2023, 500 mg IM injection.  She had been continuing on abemaciclib at 100 mg twice daily.  Patient was hospitalized 9/28 - 9/30/2023 due to ongoing diarrhea.  CT abdomen and pelvis on 9/28/2023 that showed development of a large amount of ascites.  She was discharged prior to undergoing paracentesis.  Patient underwent outpatient paracentesis on 10/9/2023 with 11 L removed.  Cytology showed rare atypical cells with immunophenotype suspicious for metastatic lobular breast cancer, cells were ER/NC negative.  Patient was to have followed up with PET scan however was hospitalized again 10/13 - 10/26/2023.  She was experiencing weakness, dizziness with multiple falls.  She was hypotensive, required vasopressor support and ICU, was found to have UTI and ileus versus small bowel obstruction.  CT abdomen pelvis on 10/19/2023 during hospitalization showed reaccumulation of moderately large volume ascites, significantly thickened appearance of the gastric antrum suggestive of antritis or possibly metastatic disease to the gastric antrum.  Small left pleural effusion and new left lower lobe atelectasis.  She did have ongoing difficulty with anemia that required transfusion support.  Following discharge she was scheduled for outpatient PET scan and follow-up to discuss her progressive disease.  Patient is now admitted with recurrent hypotensive issues with dialysis.  She is requiring pressor support and is receiving transfusion.  I had a lengthy conversation with the patient today.  We reviewed recent findings indicating progressive large volume  ascites with cytology from peritoneal fluid suspicious for involvement from her malignancy.  We know that she had carcinomatosis previously when she presented with metastatic disease.  I suspect that she is refractory to her current treatment and we will discontinue abemaciclib.  She has not received Faslodex since September.  We have exhausted the option for hormonal agents and it appears that her disease is ER/NC negative at this point.  The only options at this point would be chemotherapy based.  As we have discussed in the past, with her underlying ESRD, there are significant limitations and options for treatment for single agent palliative chemotherapy and given her recent decline and current ECOG performance status of 2-3, she is not a candidate for chemotherapy.  We discussed goals of care and discussed that chemotherapy would be detrimental to her current quality of life.  She expressed understanding, noted that she realized that this day would come at some point.  She indicates that her  and sister have had a difficult time accepting that she may not be able to continue on treatment and she has asked that I have a discussion with them tomorrow.  We discussed pursuit of a palliative/supportive care approach and she will consider this further.  Certainly there are many options and how this could be done in terms of whether she wants to maintain dialysis support in the short-term.  She is well aware that discontinuation of dialysis would make her death imminent and is not necessarily ready to make that decision.  We did discuss CODE STATUS and she is currently full code but will discuss this further with her .  She would like for us to obtain CT scans to fully assess status of her disease which we will order.    *ESRD on HD  Patient had been using home dialysis before recent hospitalization  Following recent hospitalization, patient had been undergoing outpatient intermittent dialysis Monday  Wednesday Friday  Patient developed hypotension in dialysis 11/2/2023.  Patient was felt to be too unstable for dialysis today, planning dialysis tomorrow, initiated midodrine    *Multifactorial anemia  Patient with chronic anemia secondary to ESRD, malignancy, chronic disease, myelosuppression, previous GI blood loss.  Patient receives BEAU support from nephrology with dialysis.  Receives Mircera  every 4 weeks in the outpatient setting.  Patient has required intermittent transfusion support in the outpatient setting.  Recent anemia evaluation on 10/15/2023 with iron 46, ferritin 943, iron saturation 46%, TIBC 100  Patient continues on oral folic acid 1 mg daily and B12 1000 mcg daily  Patient with hypotension, hemoglobin 7.7 and is receiving 1 unit PRBC currently.     *CHF with severe aortic stenosis:  Patient with progression to severe aortic stenosis in 2022  Cardiac catheterization 8/23/2022 showed normal coronary arteries, mild to moderate pulmonary hypertension.    She was evaluated by cardiothoracic surgery Dr. Pagan and there was discussion regarding potential candidacy for TAVR although there was concern given her underlying comorbidities including her metastatic breast cancer.  I discussed patient's prognosis with Dr. Pagan.  She does have opportunities for additional treatment of her malignancy with intravenous chemotherapy and is willing to pursue this in the future when needed.  It is unclear as to her expected survival however given her multiple severe comorbidities with metastatic breast cancer, ESRD on dialysis, severe aortic stenosis, severe anemia.  There is consideration of possible pursuit of balloon valvuloplasty initially to assess her symptomatic response and then consider pursuit of TAVR in the future.   Patient did undergo aortic valvuloplasty during admission 1/10 - 1/11/2023.  Echocardiogram on 10/14/2023 with ejection fraction 58.4%, severe aortic stenosis noted.    *CODE STATUS  Patient  currently full code.  See above discussion.      PLAN:   Discontinue abemaciclib as patient appears to be refractory to current treatment  CT chest abdomen pelvis to assess status of her malignancy  Patient is not a candidate for palliative chemotherapy for metastatic breast cancer due to her comorbidities and compromised ECOG performance status of 2-3.  Patient considering pursuit of palliative/supportive care  I will plan to meet with the patient, her , and sister tomorrow to further discuss direction of care.  Patient currently remains full code although plans to discuss this further with her .  CBC in AM.    Discussed with patient at bedside.        Leodan Irvin MD

## 2023-11-03 NOTE — PROCEDURES
Ultrasound Guided Arterial Catheter Insertion Procedure Note      Indications:  Hemodynamic Monitoring    Procedure Details:    Informed consent was obtained for the procedure, including sedation.  Risks and benefits were discussed including possible vascular compromise resulting in limb ischemia.    Maximum sterile technique was used including usual patient drapes, antiseptics and physician sterile garments.    Under sterile conditions the right Radial site was prepped with chlorhexidine and covered with a sterile drape. A ultrasound probe with sterile sheath was used to localize the target artery. It was clearly visualized. Local anesthesia was applied to the skin and subcutaneous tissues with lidocaine 1%. An  needle was then inserted into the artery under ultrasound guidance. Guide wire was threaded through the  needle- however, the catheter was unable to thread over the guidewire. Procedure aborted.    Complications:  None.    Electronically signed by BETY Stephens, 11/03/23, 6:25 PM EDT.   
right/internal jugular
left

## 2023-11-03 NOTE — PROGRESS NOTES
Name: Juana Hall ADMIT: 2023   : 1960  PCP: Kiana Noriega APRN ([place])    MRN: [id number] LOS: 1 days   AGE/SEX: 63 y.o. female  ROOM: [facility]     Subjective   Subjective   Resting in bed.  3 family members at bedside.  She states that she was very symptomatic with her low pressures earlier this week had dizziness as well as subsequent vomiting.  She states that she is currently without any dizziness, chest pain, dyspnea, cough, fever or chills.  She does produce some urine and denies any dysuria.  She is currently receiving a bolus per nephrology and may need transfer to the ICU for vasopressors if this is not responsive.      Objective   Objective   Vital Signs  Temp:  [97.7 °F (36.5 °C)-98.6 °F (37 °C)] 98.6 °F (37 °C)  Heart Rate:  [] 98  Resp:  [18-20] 18  BP: ()/(33-96) 68/49  SpO2:  [72 %-99 %] 99 %  on  Flow (L/min):  [2] 2;   Device (Oxygen Therapy): CPAP;nasal cannula  Body mass index is 54.11 kg/m².    Physical Exam  Vitals and nursing note reviewed.   Constitutional:       Appearance: She is obese. She is ill-appearing. She is not toxic-appearing.   Cardiovascular:      Rate and Rhythm: Normal rate and regular rhythm.      Pulses: Normal pulses.      Heart sounds: Murmur heard.   Pulmonary:      Effort: Pulmonary effort is normal. No respiratory distress.      Comments: Diminished, on RA  Abdominal:      General: Bowel sounds are normal. There is distension (reports baseline).      Palpations: Abdomen is soft.      Tenderness: There is no abdominal tenderness.   Musculoskeletal:         General: No tenderness. Normal range of motion.      Cervical back: Normal range of motion and neck supple.   Skin:     General: Skin is warm and dry.      Coloration: Skin is pale.      Findings: Bruising present.   Neurological:      General: No focal deficit present.      Mental Status: She is alert and oriented to person, place, and time.      Sensory: No sensory deficit.      Motor:  "Weakness present.      Coordination: Coordination normal.   Psychiatric:         Mood and Affect: Mood normal.         Behavior: Behavior normal.     Results Review:       I reviewed the patient's new clinical results.  Results from last 7 days   Lab Units 11/03/23  0632 11/02/23  1144   WBC 10*3/mm3 6.89 6.66   HEMOGLOBIN g/dL 7.7* 8.3*   PLATELETS 10*3/mm3 217 235     Results from last 7 days   Lab Units 11/03/23  0632 11/02/23  1316   SODIUM mmol/L 132* 133*   POTASSIUM mmol/L 5.4* 4.8   CHLORIDE mmol/L 96* 95*   CO2 mmol/L 23.0 25.7   BUN mg/dL 35* 33*   CREATININE mg/dL 6.78* 6.52*   GLUCOSE mg/dL 122* 156*   Estimated Creatinine Clearance: 13 mL/min (A) (by C-G formula based on SCr of 6.78 mg/dL (H)).  Results from last 7 days   Lab Units 11/02/23  1316   ALBUMIN g/dL 2.1*   BILIRUBIN mg/dL 0.6   ALK PHOS U/L 122*   AST (SGOT) U/L 24   ALT (SGPT) U/L 17     Results from last 7 days   Lab Units 11/03/23  0632 11/02/23  1316   CALCIUM mg/dL 7.8* 8.1*   ALBUMIN g/dL  --  2.1*       No results found for: \"HGBA1C\", \"POCGLU\"    Abemaciclib, 100 mg, Oral, BID  atorvastatin, 40 mg, Oral, Nightly  folic acid, 1 mg, Oral, Daily  gabapentin, 300 mg, Oral, Nightly  levothyroxine, 100 mcg, Oral, Q AM  midodrine, 10 mg, Oral, TID AC  multivitamin, 1 tablet, Oral, Daily  pantoprazole, 40 mg, Oral, QAM  senna-docusate sodium, 2 tablet, Oral, BID  sertraline, 150 mg, Oral, Daily  vitamin B-12, 1,000 mcg, Oral, Daily       Diet: Cardiac Diets; Healthy Heart (2-3 Na+); Texture: Regular Texture (IDDSI 7); Fluid Consistency: Thin (IDDSI 0)       Assessment/Plan     Active Hospital Problems    Diagnosis  POA    **Hypotension [I95.9]  Yes    Combined systolic and diastolic congestive heart failure [I50.40]  Yes    Severe aortic stenosis [I35.0]  Yes    ESRD (end stage renal disease) [N18.6]  Yes    Morbid obesity with BMI of 50.0-59.9, adult [E66.01, Z68.43]  Not Applicable    Anemia, chronic disease [D63.8]  Yes    Anxiety and " depression [F41.9, F32.A]  Yes    Primary malignant neoplasm of breast with metastasis [C50.919]  Yes    Acquired hypothyroidism [E03.9]  Yes    Diabetes type 2, controlled [E11.9]  Yes      Resolved Hospital Problems   No resolved problems to display.     Ms. Hall is a 63 y.o. female who presented to the hospital with bradycardia and hypotension during dialysis.  Her heart rate and blood pressure were apparently normal when EMS arrived.  She was recently discharged from this facility 10/26/2023 after a 13-day stay for weakness, dizziness and multiple falls in the setting of hypotension.  She required a stay in the ICU at that time for vasopressors.  She had had a ultrasound-guided paracentesis on 10/19 with 11 L of fluid removed prior to the onset of symptoms.  She has a known history of metastatic breast cancer followed by Dr. Irvin and treated for pneumonia as well as a UTI during her stay.  Her hospitalization was complicated by ileus requiring general surgery evaluation.  She was given hemodialysis during this stay per nephrology and placed on midodrine 10 mg 3 times daily.  Was maintained on therapy for her breast cancer with new diagnosis of peritoneal carcinomatosis plans to follow-up outpatient for possible new treatment recommendations.  Was seen by cardiology as well given her A-fib and severe aortic valve stenosis.  She was not felt to be a candidate for any intervention and palliative care was recommended, but refused by the patient.    Hypotension  -Ongoing despite midodrine.   -Plans for IV fluid bolus per nephrology.    -If no further improvement will need transfer to the ICU for vasopressors as she required these last admission.  -Currently is asymptomatic, but was symptomatic prior this week.     ESRD  -Nephrology managing.  -Previously on home dialysis and has been getting Tuesday/Thursday/Saturday schedule while at rehab.  -Monitor labs.     Primary malignant neoplasm of breast with  metastasis  -Established with Dr. Irvin.  -Currently found to have peritoneal carcinomatosis requiring paracentesis 10/9.  -Lantus to continue Verzenio and resume Faslodex as outpatient.  -May need adjustment in treatment therapy given progression.  Plans for outpatient PET.  -Ask oncology to see- really needs further goals of care discussion.    Anemia of chronic disease  -Secondary to malignancy as well as ESRD.  -Hemoglobin near recent baseline of 7-8.  -Monitor and transfuse as needed.    Combined systolic and diastolic heart failure  Severe aortic stenosis  PAF  -Seen by cardiology last admission.  -Not a candidate for any treatment for valvular issues.   -Volume removal per hemodialysis.   -Able to tolerate beta-blocker due to pressures.  On no anticoagulation secondary to anemia.    DM 2  -Sliding scale insulin as needed.    Hypothyroidism  -Continue home replacement.    Discussed with patient, family, nurse, Dr. Loya and Dr. Antonio.    BP remains low despite bolus- transfer to ICU.    Patient agreeable. She was recommended palliative in the past but refused. She confirms full code status at this time. She has a guarded prognosis.     VTE Prophylaxis - SCDs  Code Status - Full code  Disposition - Anticipate discharge TBD.      BETY Irving  [facility]  11/03/23  09:00 EDT

## 2023-11-03 NOTE — NURSING NOTE
Pt has a restricted  extremity on left side from Hd fistula, attempted to place blood pressure cuff on right upper arm, unable to fit expect on the forearm. Pt AOx4, automatic blood pressure cuff reading 60/40s, david gtt started, after titration of drip automatic readings haven't changed, manual blood pressure reading 116/62. Pt not dizzy, Hr stable. David gtt turned off, will continue to get manual blood pressure. Would benefit from alpa placement.

## 2023-11-03 NOTE — DISCHARGE PLACEMENT REQUEST
"Azael Hall (63 y.o. Female)       Date of Birth   1960    Social Security Number       Address   [address] [place] [address]    Home Phone   [phone]    MRN   [id number]       Yazidi   None    Marital Status                               Admission Date   11/2/23    Admission Type   Emergency    Admitting Provider   Mary Rose MD    Attending Provider   Jorge Loya MD    Department, Room/Bed   [address], E468/1       Discharge Date       Discharge Disposition       Discharge Destination                                 Attending Provider: Jorge Loya MD    Allergies: No Known Allergies    Isolation: None   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)   Code Status: CPR    Ht: 170.2 cm (67\")   Wt: 157 kg (345 lb 7.4 oz)    Admission Cmt: None   Principal Problem: Hypotension [I95.9]                   Active Insurance as of 11/2/2023       Primary Coverage       Payor Plan Insurance Group Employer/Plan Group    [facility] MEDICARE REPLACEMENT [facility] MEDICARE REPLACEMENT [id number]       Payor Plan Address Payor Plan Phone Number Payor Plan Fax Number Effective Dates    [address] [address]   10/1/2023 - None Entered    [facility] [id number]         Subscriber Name Subscriber Birth Date Member ID       AZAEL HALL 1960 [id number]                     Emergency Contacts        (Rel.) Home Phone Work Phone Mobile Phone    IsabelRk (Spouse) [phone] -- [phone]                "

## 2023-11-04 NOTE — PROGRESS NOTES
Patient with metastatic breast cancer, end-stage renal disease on hemodialysis, anemia, CHF with severe aortic stenosis.    Neurology seen the patient yesterday for dysarthria, MRI brain ordered but the patient declined as she is discussing to pursue palliative/supportive care.    Dysarthria on exam today completely resolved.  Suspect dysarthria related to hypotension and hypothyroidism.    No further stroke work-up needed, will sign off and see again as needed.

## 2023-11-04 NOTE — PROGRESS NOTES
"  Daily Progress Note.   [facility] CARDIAC INTENSIVE CARE  11/4/2023    Patient:  Name:  Juana Hall  MRN:  [id number]  1960  63 y.o.  female         Requesting physician: Dr Jorge Loya     Reason for Consultation:  hypotension shock     CC: dizzy hypotensive     Interval History:  Bp this am 100/64  2lnc sats 96%  Afebrile  Some nausea overnight.  [place] attempted not able to thread, patient wishing to not puruse further invasive procedures.  Awake alert conversant no lethargy  Didn't get much sleep last night.    Physical Exam:  /64   Pulse 96   Temp 97.6 °F (36.4 °C) (Oral)   Resp 16   Ht 170.2 cm (67\")   Wt (!) 158 kg (348 lb 8.8 oz)   LMP  (LMP Unknown)   SpO2 96%   BMI 54.58 kg/m²   Body mass index is 54.58 kg/m².    Intake/Output Summary (Last 24 hours) at 11/4/2023 1058  Last data filed at 11/4/2023 0953  Gross per 24 hour   Intake 2009 ml   Output 0 ml   Net 2009 ml     General appearance: awake, conversant   Eyes: Anicteric sclerae, moist conjunctivae; no lid lag;   HENT: Atraumatic; oropharynx clear with moist mucous membranes Mallampati 4  Neck: Trachea midline; large neck circumference limiting exam  Lungs: Distant given body habitus, with normal respiratory effort and no intercostal retractions  CV: Distant given body habitus RRR, no rub +ALEJANDRO strong  Abdomen: Soft, nontender; obese bowel sounds positive  Extremities: lle more than right le peripheral edema    Skin: Warm dry well-perfused no diffuse visible rash  Psych: Appropriate affect, alert  Neuro cranials 2 through grossly intact speech intact moves all extremities    Data Review:  Notable Labs:  Results from last 7 days   Lab Units 11/04/23  0429 11/03/23  0632 11/02/23  1144   WBC 10*3/mm3 6.81 6.89 6.66   HEMOGLOBIN g/dL 8.5* 7.7* 8.3*   PLATELETS 10*3/mm3 174 217 235     Results from last 7 days   Lab Units 11/04/23  0429 11/03/23  0632 11/02/23  1316   SODIUM mmol/L 132* 132* 133*   POTASSIUM mmol/L 5.8* " 5.4* 4.8   CHLORIDE mmol/L 95* 96* 95*   CO2 mmol/L 25.0 23.0 25.7   BUN mg/dL 38* 35* 33*   CREATININE mg/dL 7.05* 6.78* 6.52*   GLUCOSE mg/dL 112* 122* 156*   CALCIUM mg/dL 7.8* 7.8* 8.1*   Estimated Creatinine Clearance: 12.6 mL/min (A) (by C-G formula based on SCr of 7.05 mg/dL (H)).    Results from last 7 days   Lab Units 11/04/23  0429 11/03/23  0632 11/02/23  1316 11/02/23  1144   AST (SGOT) U/L  --   --  24  --    ALT (SGPT) U/L  --   --  17  --    PLATELETS 10*3/mm3 174 217  --  235             Imaging:  Reviewed chest images personally from past 3 days      Last transthoracic echocardiogram 10/15/2023 remarkable for EF of 58% with severe aortic valve stenosis aortic valve area 0.9 and mean pressure gradient of 48        Assessment / Recommendations:  ESRD on HD  Anemia  Hyponatremia  Hyperkalemia  Hypotension  Nausea vomiting  Metastatic breast cancer  Severe aortic stenosis with history of balloon valvuloplasty  Morbid obesity  Type 2 diabetes  Anxiety depression  Peripheral neuropathy  Hypothyroidism  Psoriasis  Chronic diastolic heart failure        Phenylephrine  Midodrine  Question accuracy of blood pressures, a line unable to advanced and patient not wishing further attempts and she is considering her overall goals of care after a discussing with her oncologist yesterday evening.    Continue phenylephrine for now and blood pressure and monitoring patients mental status.    Neurology consult for slurring of speech discussed with Dr Corey     TSH slightly elevated dose increased from 50 at home to 100 here.  Very minimally elevated not a causative etiology.    Hd needs per nephrology.    Patient having goals of care discussion again today with Dr Irvin this afternoon.  She wishes to hold any further aggressive measures until that time.    Electronically signed by Pedro Luis Sykes MD, 11/04/23, 10:58 AM EDT.  [facility]  Total critical care time was 38 minutes, excluding any  separately billable procedure time.  Time did not overlap with any other provider.

## 2023-11-04 NOTE — PROGRESS NOTES
[facility]     Progress Note    Patient Name: Juana Hall  : 1960  MRN: [id number]  Primary Care Physician:  Kiana Noriega ([place]), BETY  Date of admission: 2023    Subjective   Subjective     Chief Complaint: ESRD    History of Present Illness  Patient with ESRD and metastatic breast cancer    Review of Systems    Objective   Objective     Vitals:   Temp:  [97.6 °F (36.4 °C)-98.3 °F (36.8 °C)] 97.6 °F (36.4 °C)  Heart Rate:  [] 90  Resp:  [16-18] 16  BP: ()/() 100/64  Flow (L/min):  [2] 2    Physical Exam   General Appearance:  In no acute distress.    HEENT: Normal HEENT exam.     Extremities: .  1+ dependent edema.    Neurological: Patient is alert      Result Review    Result Review:  I have personally reviewed the results from the time of this admission to 2023 12:33 EDT and agree with these findings:  []  Laboratory list / accordion  []  Microbiology  []  Radiology  []  EKG/Telemetry   []  Cardiology/Vascular   []  Pathology  []  Old records  []  Other:  Most notable findings include:       Assessment & Plan   Assessment / Plan     Brief Patient Summary:  Juana Hall is a 63 y.o. female who has ESRD and metastatic breast cancer    Active Hospital Problems:  Active Hospital Problems    Diagnosis     **Hypotension     Combined systolic and diastolic congestive heart failure     Severe aortic stenosis     ESRD (end stage renal disease)     Morbid obesity with BMI of 50.0-59.9, adult     Anemia, chronic disease     Anxiety and depression     Primary malignant neoplasm of breast with metastasis     Acquired hypothyroidism     Diabetes type 2, controlled      Plan:   Patient seen and examined. Labs and chart reviewed. Patient with metastatic breast cancer on home hemodialysis.  D/w nurse and with oncology.  Patient agreeable to palliative consult but wants to continue hd for now.  Bp has been low but question accuracy. Patient asymptomatic.  Discussed need for hd  today due to hyperkalemia.  Will order hd today without UF. Manual bp checks.  Will follow closely    Winnie Montes MD

## 2023-11-04 NOTE — NURSING NOTE
Pt is refusing MRI of the head this evening. Pt and spouse informed at bedside of risks of not completing MRI. Pt acknowledged and would still like to hold off.   Pt and spouse would also like to hold off on CT abd /pelvis and CT chest until tomorrow AM after Oncology doctor has a meeting with the family. Pulmonology updated.

## 2023-11-04 NOTE — PLAN OF CARE
Goal Outcome Evaluation:      Pt remains in the CICU. On 2 L nasal cannula. 1 Un PRBC completed. Still unable to obtain accurate blood pressures via automatic blood pressure cuff. Phenylephrine restarted this AM for manual systolic reading in the 70s. PRN norco added for complaints of abdomen pain.

## 2023-11-04 NOTE — PLAN OF CARE
Goal Outcome Evaluation:  Plan of Care Reviewed With: patient, spouse        Progress: no change       Pt remains in CICU.  A&O; no complaints of dizziness or lightheadedness this shift.  Monitoring blood pressure remains a difficult issue: intermittent manual readings obtained on right forearm as pt consented; titrating David; medical team aware of this challenge.  Pt hungry but becomes nauseated; one episode of emesis; PRN Zofran and scopolamine utilized.  No UOP.  Pt and her family had a long discussion with her oncologist about plan of care going forward.  Plan is DNR/DNI code status, no more chemotherapy, and begin palliative route while continuing HD as tolerated for now.  Orders placed for treatment this afternoon/evening.

## 2023-11-04 NOTE — PROGRESS NOTES
[facility] INPATIENT PROGRESS NOTE    Length of Stay:  2 days    CHIEF COMPLAINT:  Metastatic lobular breast cancer, ESRD, anemia, CHF, aortic stenosis    SUBJECTIVE:   Ongoing difficulties and accurately assessing the patient's blood pressure, at this point she is felt to be normotensive.  Dr. Montes in nephrology is proceeding today with conventional hemodialysis.  Patient did not want to pursue MRI brain yesterday and ultimately with resolution of her dysarthria, neurology did not feel it was necessary.  She did not yet undergo CT scans.  Patient does note nausea that occurred earlier this morning as well as some abdominal pain.    ROS:  Review of Systems   A comprehensive review of systems was obtained with pertinent positive findings as noted in the interval history above.  All other systems negative.      OBJECTIVE:  Vitals:    11/04/23 0600 11/04/23 0602 11/04/23 0654 11/04/23 0700   BP:  90/72 94/70 100/64   Pulse:  95 99 87   Resp:       Temp:       TempSrc:       SpO2:  96% 96% 97%   Weight: (!) 158 kg (348 lb 8.8 oz)      Height:             PHYSICAL EXAMINATION:  General: Alert and oriented x3 no distress lying in bed  Chest/Lungs: Clear to auscultation bilaterally anteriorly  Heart: Regular rate and rhythm with 2/6 systolic murmur  Abdomen/GI: Distended, nontender, bowel sounds present  Extremities: Trace bilateral lower extremity edema    DIAGNOSTIC DATA:  Results Review:     I reviewed the patient's new clinical results.    Results from last 7 days   Lab Units 11/04/23 0429 11/03/23  0632 11/02/23  1144   WBC 10*3/mm3 6.81 6.89 6.66   HEMOGLOBIN g/dL 8.5* 7.7* 8.3*   HEMATOCRIT % 26.9* 24.2* 26.4*   PLATELETS 10*3/mm3 174 217 235      Results from last 7 days   Lab Units 11/04/23  0429 11/03/23  0632 11/02/23  1316   SODIUM mmol/L 132* 132* 133*   POTASSIUM mmol/L 5.8* 5.4* 4.8   CHLORIDE mmol/L 95* 96* 95*   CO2 mmol/L 25.0 23.0 25.7   BUN mg/dL 38* 35* 33*   CREATININE mg/dL 7.05* 6.78*  6.52*   CALCIUM mg/dL 7.8* 7.8* 8.1*   BILIRUBIN mg/dL  --   --  0.6   ALK PHOS U/L  --   --  122*   ALT (SGPT) U/L  --   --  17   AST (SGOT) U/L  --   --  24   GLUCOSE mg/dL 112* 122* 156*      Lab Results   Component Value Date    KELLIE 5.49 11/02/2023                 Assessment & Plan   ASSESSMENT/PLAN:  This is a 63 y.o. female with:     *Metastatic lobular breast cancer  The patient has a long history of breast cancer dating back to her original diagnosis with stage IIIC disease in 2003.  She received neoadjuvant chemotherapy in the [place] system and underwent bilateral mastectomies 1/22/2004 with right axillary dissection with a residual 10 to 12 cm invasive lobular carcinoma of the right breast with 14/16 lymph nodes positive.  She received adjuvant chemotherapy and radiation.  She received tamoxifen adjuvantly with subsequent transition to Femara, stopped adjuvant endocrine therapy in June 2014.    Patient was found to have metastatic disease with carcinomatosis identified on scans in June 2019.  Omental biopsy 7/30/2019 showed metastatic lobular breast cancer that was ER positive, NV positive, HER2/tj negative.    She was treated initially for metastatic disease with Aromasin and Ibrance beginning in August 2019.  Patient experienced significant difficulty with myelosuppression from Ibrance and required dose alterations.    She developed progressive disease in November 2021, was continued on Ibrance with transition from Aromasin to Faslodex.    Patient experienced focal disease progression and received radiation therapy to L2 metastasis 5/23 - 6/7/2022 and continued on Ibrance and Faslodex.    Evidence of further progression on PET scan with new right sixth rib lesion and on 7/28/2022 transitioned from Ibrance to abemaciclib with continuation of Faslodex.    Patient was felt to be a poor candidate for chemotherapy given her underlying ESRD on dialysis and did not wish to pursue chemotherapy.    Patient  did require dose adjustment in abemaciclib due to myelosuppression.    Subsequent progression with further palliative radiation to left anterior acetabular lesion in May 2023.    Patient received most recent dose Faslodex (cycle 14-day 1) on 9/6/2023, 500 mg IM injection.  She had been continuing on abemaciclib at 100 mg twice daily.  Patient was hospitalized 9/28 - 9/30/2023 due to ongoing diarrhea.  CT abdomen and pelvis on 9/28/2023 that showed development of a large amount of ascites.  She was discharged prior to undergoing paracentesis.  Patient underwent outpatient paracentesis on 10/9/2023 with 11 L removed.  Cytology showed rare atypical cells with immunophenotype suspicious for metastatic lobular breast cancer, cells were ER/ID negative.  Patient was to have followed up with PET scan however was hospitalized again 10/13 - 10/26/2023.  She was experiencing weakness, dizziness with multiple falls.  She was hypotensive, required vasopressor support and ICU, was found to have UTI and ileus versus small bowel obstruction.  CT abdomen pelvis on 10/19/2023 during hospitalization showed reaccumulation of moderately large volume ascites, significantly thickened appearance of the gastric antrum suggestive of antritis or possibly metastatic disease to the gastric antrum.  Small left pleural effusion and new left lower lobe atelectasis.  She did have ongoing difficulty with anemia that required transfusion support.  Following discharge she was scheduled for outpatient PET scan and follow-up to discuss her progressive disease.  Patient is now admitted with recurrent hypotensive issues with dialysis.  She is requiring pressor and transfusion support.  Patient with progressive recent large volume ascites with cytology from peritoneal fluid on 10/9/2023 suspicious for involvement from her malignancy.  We know that she had carcinomatosis previously when she presented with metastatic disease.  It appears that she is  refractory to her most recent treatment with abemaciclib/Faslodex.  She has not received Faslodex since September.  Abemaciclib now discontinued.  We have exhausted the option for hormonal agents and it appears that her disease is ER/LA negative at this point by the recent cytology.  The only options at this point would be chemotherapy based.  As we have discussed in the past, with her underlying ESRD, there are significant limitations and options for treatment for single agent palliative chemotherapy and given her recent decline with current ECOG performance status of 2-3, she is not a candidate for chemotherapy.   On 11/3/2023 we discussed goals of care and discussed that chemotherapy would be detrimental to her current quality of life given her comorbidities and compromised ECOG performance status of 2-3.  She expressed understanding, noted that she realized that this day would come at some point.  She indicated that her  and sister have had a difficult time accepting that she may not be able to continue on treatment and requested that we have a discussion with them as well.  We discussed pursuit of a palliative/supportive care approach and she will consider this further.  Certainly there are many options and how this could be done in terms of whether she wants to maintain dialysis support in the short-term.  She is well aware that discontinuation of dialysis would make her death imminent and is not necessarily ready to make that decision.    I had a lengthy meeting with the patient, her , her sister, and her son today at the bedside.  We reviewed all of the above issues again.  We discussed that her metastatic breast cancer is progressing with worsening carcinomatosis producing ascites and also contributed to her recent ileus/partial obstruction during her last hospitalization.  She has known carcinomatosis identified at the time of her diagnosis of metastatic disease and cytology from peritoneal  fluid on 10/9/2023 did show atypical cells that were felt to be consistent with lobular breast cancer that have become ER/HI negative.  We have tried to maintain treatment with endocrine therapy for a lengthy time however at this point her disease has become resistant and there are no further hormonal manipulations that would be effective.  The only other option is chemotherapy which would be problematic in terms of available drugs from the standpoint of her ESRD and more importantly her ability to tolerate treatment with her compromised ECOG performance status making risk of adverse effects from treatment very high.  Therefore I have recommended that we proceed with a palliative/supportive care course for her disease.  Patient and family are in agreement.  We then discussed how to manage her medical issues on an ongoing basis and a palliative care sense.  She does not want to stop dialysis at this point as this would lead to rapid decline.  I did contact Dr. Montes in nephrology who is seeing the patient today and they would be comfortable with continuation of the patient's home dialysis arrangement at this point.  We did review that if she continues dialysis, full hospice care would not be available.  We could however pursue [facility] involvement at home for now with transition to full hospice care when she makes the decision in the future to forego dialysis.  We will need to consult [facility] on Monday when available.  Finally, we addressed her CODE STATUS and she would like to maintain her current level of medical care however would like to be DNR and DNI.  We originally had discussed obtaining CT chest abdomen pelvis in order to reassess extent of her disease.  We discussed that this is not necessary as we do know clinically her disease is progressing and scans will not change the situation.  Patient agreed and we will cancel CT scans.     *ESRD on HD  Patient had been using home dialysis before recent  hospitalization  Following recent hospitalization, patient had been undergoing outpatient intermittent dialysis Monday Wednesday Friday  Patient developed hypotension in dialysis 11/2/2023.  Accuracy of blood pressure measurement in question, patient felt to be normotensive currently.  Plans for conventional dialysis today.     *Multifactorial anemia  Patient with chronic anemia secondary to ESRD, malignancy, chronic disease, myelosuppression, previous GI blood loss.  Patient receives BEAU support from nephrology with dialysis.  Receives Mircera  every 4 weeks in the outpatient setting.  Patient has required intermittent transfusion support in the outpatient setting.  Recent anemia evaluation on 10/15/2023 with iron 46, ferritin 943, iron saturation 46%, TIBC 100  Patient continues on oral folic acid 1 mg daily and B12 1000 mcg daily  On 11/3/2023, hemoglobin 7.7 and received 1 unit PRBC  Hemoglobin today improved at 8.5, hold on further transfusion support for now     *CHF with severe aortic stenosis:  Patient with progression to severe aortic stenosis in 2022  Cardiac catheterization 8/23/2022 showed normal coronary arteries, mild to moderate pulmonary hypertension.    She was evaluated by cardiothoracic surgery Dr. Pagan and there was discussion regarding potential candidacy for TAVR although there was concern given her underlying comorbidities including her metastatic breast cancer.  I discussed patient's prognosis with Dr. Pagan.  She does have opportunities for additional treatment of her malignancy with intravenous chemotherapy and is willing to pursue this in the future when needed.  It is unclear as to her expected survival however given her multiple severe comorbidities with metastatic breast cancer, ESRD on dialysis, severe aortic stenosis, severe anemia.  There is consideration of possible pursuit of balloon valvuloplasty initially to assess her symptomatic response and then consider pursuit of TAVR in the  future.   Patient did undergo aortic valvuloplasty during admission 1/10 - 1/11/2023.  Echocardiogram on 10/14/2023 with ejection fraction 58.4%, severe aortic stenosis noted.    *Transient dysarthria  Patient has experienced similar symptoms in the past associated with hypotension  Patient was seen by neurology, did not feel that she was experiencing strokelike symptoms but did request MRI brain  Patient refused MRI brain, symptoms resolved.  Neurology has signed off.    *CODE STATUS  Patient wishes to be DNR/DNI, discussed with her , sister, son in the room and we will make change in her records.        PLAN:   Treatment with Faslodex/abemaciclib discontinued as patient appears to have refractory disease with recent progression of carcinomatosis  Patient is not a candidate for palliative chemotherapy for metastatic breast cancer due to her comorbidities and compromised ECOG performance status of 2-3.  Patient would like to pursue a palliative/supportive care approach for her malignancy  Patient would like to continue dialysis at this time, is aware we will need to make decisions regarding cessation of dialysis in the future  With ongoing dialysis patient is not a candidate for full hospice care at home.  We will however consult [facility] on Monday to assist with her home care while she continues on dialysis.  She would transition to full hospice care in the future once dialysis is discontinued  Patient would like to be DNR/DNI but maintain current level of support for now.  Daily CBC    Discussed with patient and family at bedside.  Discussed with Dr. Montes in nephrology.    I did spend 1 hour in total time caring for the patient today, time spent in review of records, face-to-face consultation, coordination of care, placement of orders, completion of documentation.             Leodan Irvin MD

## 2023-11-05 NOTE — PLAN OF CARE
Goal Outcome Evaluation:              Outcome Evaluation: Patient remains in CICU on [facility]. Patient recieved HD in a.m. with labs schedule for 2 hours following completion. Nauseous overnight with PRN zofran administered x2. BP continues to be monitored manually.

## 2023-11-05 NOTE — PROGRESS NOTES
"  Daily Progress Note.   [facility] CARDIAC INTENSIVE CARE  11/5/2023    Patient:  Name:  Juana Hall  MRN:  [id number]  1960  63 y.o.  female         Requesting physician: Dr Jorge Loya     Reason for Consultation:  hypotension shock     CC: dizzy hypotensive     Interval History:  At present time on room air saturation 95%.  Patient's afebrile.  No tachycardia.  She is awake alert oriented.  No lethargy no confusion.  Tolerated hemodialysis yesterday.  Has a few readings over 100 yesterday of 60s over 40s at present time is on phenylephrine.    Hd overnight from 130-5 AM  Emesis afterwards none since  No N at present   No abd pain.      Physical Exam:  BP 96/66 (BP Location: Right arm, Patient Position: Lying)   Pulse 92   Temp 97.5 °F (36.4 °C) (Oral)   Resp 16   Ht 170.2 cm (67\")   Wt (!) 156 kg (343 lb 14.7 oz)   LMP  (LMP Unknown)   SpO2 95%   BMI 53.85 kg/m²   Body mass index is 53.85 kg/m².    Intake/Output Summary (Last 24 hours) at 11/5/2023 1104  Last data filed at 11/5/2023 0428  Gross per 24 hour   Intake 921.16 ml   Output 300 ml   Net 621.16 ml     General appearance: awake, conversant   Eyes: Anicteric sclerae, moist conjunctivae; no lid lag;   HENT: Atraumatic; oropharynx clear with moist mucous membranes Mallampati 4  Neck: Trachea midline; large neck circumference limiting exam  Lungs: Distant given body habitus, with normal respiratory effort and no intercostal retractions  CV: Distant given body habitus RRR, no rub +ALEJANDRO strong  Abdomen: Soft, nontender; obese bowel sounds positive  Extremities: lle more than right le peripheral edema    Skin: Warm dry well-perfused no diffuse visible rash  Psych: Appropriate affect, alert  Neuro cranials 2 through grossly intact speech intact moves all extremities    Data Review:  Notable Labs:  Results from last 7 days   Lab Units 11/05/23  0644 11/04/23  0429 11/03/23  0632 11/02/23  1144   WBC 10*3/mm3 7.58 6.81 6.89 6.66 "   HEMOGLOBIN g/dL 9.2* 8.5* 7.7* 8.3*   PLATELETS 10*3/mm3 161 174 217 235     Results from last 7 days   Lab Units 11/05/23  0644 11/04/23  0429 11/03/23  0632 11/02/23  1316   SODIUM mmol/L 136 132* 132* 133*   POTASSIUM mmol/L 4.9 5.8* 5.4* 4.8   CHLORIDE mmol/L 99 95* 96* 95*   CO2 mmol/L 22.0 25.0 23.0 25.7   BUN mg/dL 23 38* 35* 33*   CREATININE mg/dL 4.99* 7.05* 6.78* 6.52*   GLUCOSE mg/dL 88 112* 122* 156*   CALCIUM mg/dL 7.9* 7.8* 7.8* 8.1*   Estimated Creatinine Clearance: 17.6 mL/min (A) (by C-G formula based on SCr of 4.99 mg/dL (H)).    Results from last 7 days   Lab Units 11/05/23  0644 11/04/23 0429 11/03/23  0632 11/02/23  1316   AST (SGOT) U/L  --   --   --  24   ALT (SGPT) U/L  --   --   --  17   PLATELETS 10*3/mm3 161 174 217  --              Imaging:  Reviewed chest images personally from past 3 days      Last transthoracic echocardiogram 10/15/2023 remarkable for EF of 58% with severe aortic valve stenosis aortic valve area 0.9 and mean pressure gradient of 48        Assessment / Recommendations:  ESRD on HD  Anemia  Hyponatremia  Hyperkalemia  Hypotension  Nausea vomiting  Metastatic breast cancer  Severe aortic stenosis with history of balloon valvuloplasty  Morbid obesity  Type 2 diabetes  Anxiety depression  Peripheral neuropathy  Hypothyroidism  Psoriasis  Chronic diastolic heart failure  Palliative care in regards to onc care but wishes ongoing HD for now, to follow with pallitus as outpatient.    Phenylephrine  Midodrine 10 tid  Question accuracy of blood pressures, a line unable to advanced and patient not wishing further attempts and she is considering her overall goals of care after a discussing with her oncologist yesterday evening.  Will attempt manual bp today to see if we can get accurate readings and get off of phenylephrine    Continue phenylephrine for now and blood pressure and monitoring patients mental status.    Neurology consult for slurring of speech discussed with   Hernantadher     TSH slightly elevated dose increased from 50 at home to 100 here.  Very minimally elevated not a causative etiology. Will increase to 150 monitor for any response.    Hd needs per nephrology.    Note limitations and goals of care conversation with her oncologist .    Total critical care time was 38 minutes, excluding any separately billable procedure time.  Time did not overlap with any other provider.      Electronically signed by Pedro Luis Sykes MD, 11/05/23, 11:23 AM EST.    Of note she does not wish alpa at this juncture.

## 2023-11-05 NOTE — PLAN OF CARE
Goal Outcome Evaluation:      PT A/O x4. Pt calm but withdrawn. Palliative consult placed for tomorrow to discuss goals of care for pt to go home. Pt still on BP gtt. Pt refused turns. Pt refused bath. Pt refused some but not all medications. Pt had emesis episode while taking midodrine, green dark bile in color. Pt BS also dropped to 53, Dextrose given. Manual BP still being taken as automatic cuffs do not fit and will not read pt accurately.

## 2023-11-05 NOTE — SIGNIFICANT NOTE
11/05/23 1050   OTHER   Discipline physical therapist   Rehab Time/Intention   Session Not Performed patient/family declined evaluation  (pt declined PT today, had dialysis all night and it too fatigued today. requests PT f/u tomorrow)   Recommendation   PT - Next Appointment 11/06/23

## 2023-11-05 NOTE — PROGRESS NOTES
[facility]     Progress Note    Patient Name: Juana Hall  : 1960  MRN: [id number]  Primary Care Physician:  Kiana Noriega ([place]), BETY  Date of admission: 2023    Subjective   Subjective     Chief Complaint: ESRD    History of Present Illness  Patient with ESRD and metastatic breast cancer    Review of Systems    Objective   Objective     Vitals:   Temp:  [97.5 °F (36.4 °C)-97.8 °F (36.6 °C)] 97.5 °F (36.4 °C)  Heart Rate:  [] 92  BP: ()/(40-80) 96/66  Flow (L/min):  [2] 2    Physical Exam   General Appearance:  In no acute distress.    HEENT: Normal HEENT exam.     Extremities: .  1+ dependent edema.    Neurological: Patient is alert      Result Review    Result Review:  I have personally reviewed the results from the time of this admission to 2023 10:33 EST and agree with these findings:  []  Laboratory list / accordion  []  Microbiology  []  Radiology  []  EKG/Telemetry   []  Cardiology/Vascular   []  Pathology  []  Old records  []  Other:  Most notable findings include:       Assessment & Plan   Assessment / Plan     Brief Patient Summary:  Juana Hall is a 63 y.o. female who has ESRD and metastatic breast cancer    Active Hospital Problems:  Active Hospital Problems    Diagnosis     **Hypotension     Combined systolic and diastolic congestive heart failure     Severe aortic stenosis     ESRD (end stage renal disease)     Morbid obesity with BMI of 50.0-59.9, adult     Anemia, chronic disease     Anxiety and depression     Primary malignant neoplasm of breast with metastasis     Acquired hypothyroidism     Diabetes type 2, controlled      Plan:   Patient seen and examined. Labs and chart reviewed. Patient with metastatic breast cancer on home hemodialysis.  S/p hemodialysis last pm.  No complaints today. Potassium improved. Edema stable. No shortness of breath.  Now on midodrine and kassandra. Will hold on further dialysis today. Will reassess needs in am    Winnie OLIVIA  MD Simon

## 2023-11-05 NOTE — NURSING NOTE
Dialysis completed without complications and patient was asymptomatic of lower blood pressure during the treatment.  Patient was +300cc on fluid post dialysis.

## 2023-11-05 NOTE — PROGRESS NOTES
[facility] INPATIENT PROGRESS NOTE    Length of Stay:  3 days    CHIEF COMPLAINT:  Metastatic lobular breast cancer, ESRD, anemia, CHF, aortic stenosis    SUBJECTIVE:   Patient had a difficult time overnight, dialysis lasted quite some time and she did not get much rest.  She is obviously very tired today.  Her  is at the bedside.    ROS:  Review of Systems   A comprehensive review of systems was obtained with pertinent positive findings as noted in the interval history above.  All other systems negative.      OBJECTIVE:  Vitals:    11/05/23 0600 11/05/23 0649 11/05/23 0800 11/05/23 0915   BP:  110/66 98/64 96/66   BP Location:  Right arm Right arm Right arm   Patient Position:  Lying Lying Lying   Pulse: 91 94 97 92   Resp:       Temp:   97.5 °F (36.4 °C)    TempSrc:   Oral    SpO2: 96% 97% 95% 95%   Weight:       Height:             PHYSICAL EXAMINATION:  General: Alert and oriented x3 no distress lying in bed  Chest/Lungs: Clear to auscultation bilaterally anteriorly  Heart: Regular rate and rhythm with 2/6 systolic murmur  Abdomen/GI: Distended, nontender, bowel sounds present  Extremities: No edema      DIAGNOSTIC DATA:  Results Review:     I reviewed the patient's new clinical results.    Results from last 7 days   Lab Units 11/05/23  0644 11/04/23 0429 11/03/23  0632   WBC 10*3/mm3 7.58 6.81 6.89   HEMOGLOBIN g/dL 9.2* 8.5* 7.7*   HEMATOCRIT % 28.1* 26.9* 24.2*   PLATELETS 10*3/mm3 161 174 217      Results from last 7 days   Lab Units 11/05/23  0644 11/04/23  0429 11/03/23  0632 11/02/23  1316   SODIUM mmol/L 136 132* 132* 133*   POTASSIUM mmol/L 4.9 5.8* 5.4* 4.8   CHLORIDE mmol/L 99 95* 96* 95*   CO2 mmol/L 22.0 25.0 23.0 25.7   BUN mg/dL 23 38* 35* 33*   CREATININE mg/dL 4.99* 7.05* 6.78* 6.52*   CALCIUM mg/dL 7.9* 7.8* 7.8* 8.1*   BILIRUBIN mg/dL  --   --   --  0.6   ALK PHOS U/L  --   --   --  122*   ALT (SGPT) U/L  --   --   --  17   AST (SGOT) U/L  --   --   --  24   GLUCOSE mg/dL  88 112* 122* 156*      Lab Results   Component Value Date    NEUTROABS 6.67 11/05/2023                 Assessment & Plan   ASSESSMENT/PLAN:  This is a 63 y.o. female with:     *Metastatic lobular breast cancer  The patient has a long history of breast cancer dating back to her original diagnosis with stage IIIC disease in 2003.  She received neoadjuvant chemotherapy in the [facility] system and underwent bilateral mastectomies 1/22/2004 with right axillary dissection with a residual 10 to 12 cm invasive lobular carcinoma of the right breast with 14/16 lymph nodes positive.  She received adjuvant chemotherapy and radiation.  She received tamoxifen adjuvantly with subsequent transition to Femara, stopped adjuvant endocrine therapy in June 2014.    Patient was found to have metastatic disease with carcinomatosis identified on scans in June 2019.  Omental biopsy 7/30/2019 showed metastatic lobular breast cancer that was ER positive, [place] positive, HER2/tj negative.    She was treated initially for metastatic disease with Aromasin and Ibrance beginning in August 2019.  Patient experienced significant difficulty with myelosuppression from Ibrance and required dose alterations.    She developed progressive disease in November 2021, was continued on Ibrance with transition from Aromasin to Faslodex.    Patient experienced focal disease progression and received radiation therapy to L2 metastasis 5/23 - 6/7/2022 and continued on Ibrance and Faslodex.    Evidence of further progression on PET scan with new right sixth rib lesion and on 7/28/2022 transitioned from Ibrance to abemaciclib with continuation of Faslodex.    Patient was felt to be a poor candidate for chemotherapy given her underlying ESRD on dialysis and did not wish to pursue chemotherapy.    Patient did require dose adjustment in abemaciclib due to myelosuppression.    Subsequent progression with further palliative radiation to left anterior acetabular lesion in May  2023.    Patient received most recent dose Faslodex (cycle 14-day 1) on 9/6/2023, 500 mg IM injection.  She had been continuing on abemaciclib at 100 mg twice daily.  Patient was hospitalized 9/28 - 9/30/2023 due to ongoing diarrhea.  CT abdomen and pelvis on 9/28/2023 that showed development of a large amount of ascites.  She was discharged prior to undergoing paracentesis.  Patient underwent outpatient paracentesis on 10/9/2023 with 11 L removed.  Cytology showed rare atypical cells with immunophenotype suspicious for metastatic lobular breast cancer, cells were ER/MO negative.  Patient was to have followed up with PET scan however was hospitalized again 10/13 - 10/26/2023.  She was experiencing weakness, dizziness with multiple falls.  She was hypotensive, required vasopressor support and ICU, was found to have UTI and ileus versus small bowel obstruction.  CT abdomen pelvis on 10/19/2023 during hospitalization showed reaccumulation of moderately large volume ascites, significantly thickened appearance of the gastric antrum suggestive of antritis or possibly metastatic disease to the gastric antrum.  Small left pleural effusion and new left lower lobe atelectasis.  She did have ongoing difficulty with anemia that required transfusion support.  Following discharge she was scheduled for outpatient PET scan and follow-up to discuss her progressive disease.  Patient is now admitted with recurrent hypotensive issues with dialysis.  She is requiring pressor and transfusion support.  Patient with progressive recent large volume ascites with cytology from peritoneal fluid on 10/9/2023 suspicious for involvement from her malignancy.  We know that she had carcinomatosis previously when she presented with metastatic disease.  It appears that she is refractory to her most recent treatment with abemaciclib/Faslodex.  She has not received Faslodex since September.  Abemaciclib now discontinued.  We have exhausted the option  for hormonal agents and it appears that her disease is ER/MA negative at this point by the recent cytology.  The only options at this point would be chemotherapy based.  As we have discussed in the past, with her underlying ESRD, there are significant limitations and options for treatment for single agent palliative chemotherapy and given her recent decline with current ECOG performance status of 2-3, she is not a candidate for chemotherapy.   On 11/4/2023, meeting with the patient, her , her sister, and her son.  Discussed that her metastatic breast cancer is progressing with worsening carcinomatosis producing ascites and also contributed to her recent ileus/partial obstruction during her last hospitalization.  She has known carcinomatosis identified at the time of her diagnosis of metastatic disease and cytology from peritoneal fluid on 10/9/2023 did show atypical cells that were felt to be consistent with lobular breast cancer that have become ER/MA negative.  We have tried to maintain treatment with endocrine therapy for a lengthy time however at this point her disease has become resistant and there are no further hormonal manipulations that would be effective.  The only other option is chemotherapy which would be problematic in terms of available drugs from the standpoint of her ESRD and more importantly her ability to tolerate treatment with her compromised ECOG performance status making risk of adverse effects from treatment very high.  Therefore I have recommended that we proceed with a palliative/supportive care course for her disease.  Patient and family were in agreement.  We discussed how to manage her medical issues on an ongoing basis in a palliative care sense.  She does not want to stop dialysis at this point as this would lead to rapid decline.  Nephrology will plan to continue dialysis therefore for now.  With patient continuing on dialysis, full hospice care not available.  Plan to engage  Jan for home care with future transition to full hospice care when she makes the decision at some point to forego dialysis.  Consult [facility] on Monday when available.  Finally, we addressed her CODE STATUS and she would like to maintain her current level of medical care however is DNR and DNI.    Today the patient is quite fatigued after being up most of the night with dialysis.  It remains unclear how she will progress over time.  We are consulting palliative care/ [facility] tomorrow to discuss involvement in her home care until she would make the decision at some point to discontinue dialysis when she would be a candidate for full hospice care.     *ESRD on HD  Patient had been using home dialysis before recent hospitalization  Following recent hospitalization, patient had been undergoing outpatient intermittent dialysis Monday Wednesday Friday  Patient developed hypotension in dialysis 11/2/2023.  Accuracy of blood pressure measurement in question, patient felt to be normotensive currently.    Patient did undergo dialysis last night without difficulty     *Multifactorial anemia  Patient with chronic anemia secondary to ESRD, malignancy, chronic disease, myelosuppression, previous GI blood loss.  Patient receives BEAU support from nephrology with dialysis.  Receives Mircera  every 4 weeks in the outpatient setting.  Patient has required intermittent transfusion support in the outpatient setting.  Recent anemia evaluation on 10/15/2023 with iron 46, ferritin 943, iron saturation 46%, TIBC 100  Patient continues on oral folic acid 1 mg daily and B12 1000 mcg daily  On 11/3/2023, hemoglobin 7.7 and received 1 unit PRBC  Hemoglobin today improved at 9.2.  No current need for transfusion support.    *CHF with severe aortic stenosis:  Patient with progression to severe aortic stenosis in 2022  Cardiac catheterization 8/23/2022 showed normal coronary arteries, mild to moderate pulmonary hypertension.    She was  evaluated by cardiothoracic surgery Dr. Pagan and there was discussion regarding potential candidacy for TAVR although there was concern given her underlying comorbidities including her metastatic breast cancer.  I discussed patient's prognosis with Dr. Pagan.  She does have opportunities for additional treatment of her malignancy with intravenous chemotherapy and is willing to pursue this in the future when needed.  It is unclear as to her expected survival however given her multiple severe comorbidities with metastatic breast cancer, ESRD on dialysis, severe aortic stenosis, severe anemia.  There is consideration of possible pursuit of balloon valvuloplasty initially to assess her symptomatic response and then consider pursuit of TAVR in the future.   Patient did undergo aortic valvuloplasty during admission 1/10 - 1/11/2023.  Echocardiogram on 10/14/2023 with ejection fraction 58.4%, severe aortic stenosis noted.    *Transient dysarthria  Patient has experienced similar symptoms in the past associated with hypotension  Patient was seen by neurology, did not feel that she was experiencing strokelike symptoms but did request MRI brain  Patient refused MRI brain, symptoms resolved.  Neurology has signed off.    *CODE STATUS  Patient is DNR/DNI but does wish to maintain her current level of medical support        PLAN:   Treatment with Faslodex/abemaciclib discontinued as patient appears to have refractory disease with recent progression of carcinomatosis  Patient is not a candidate for palliative chemotherapy for metastatic breast cancer due to her comorbidities and compromised ECOG performance status of 2-3.  Plan to pursue a palliative/supportive care approach for her malignancy  Patient would like to continue dialysis at this time, is aware we will need to make decisions regarding cessation of dialysis in the future  With ongoing dialysis patient is not a candidate for full hospice care at home.  We will however  consult [facility] on Monday to assist with her home care while she continues on dialysis.  She would transition to full hospice care in the future once dialysis is discontinued  Patient is DNR/DNI but maintain current level of support for now.  Daily CBC    Discussed with patient and  at bedside             Leodan Irvin MD

## 2023-11-06 NOTE — PLAN OF CARE
Goal Outcome Evaluation:  Plan of Care Reviewed With: patient, spouse, son        Progress: declining  Outcome Evaluation: PPS 10%. Pt transfered from CICU this shift. Medicating prn with 4 morphine and 0.5 ativan. x1 zofran given. Pt was moved to bariactric bed. Appears to be resting comfortably. Spouse and son at bedside aware of patient's decline. Will cont comfort measures.

## 2023-11-06 NOTE — PROGRESS NOTES
I called [facility] and spoke with Camelia COVINGTON. She cancelled the Verzenio prescription.     Juana Borrego  Specialty Pharmacy Technician

## 2023-11-06 NOTE — PROGRESS NOTES
[facility] GROUP INPATIENT PROGRESS NOTE    Length of Stay:  4 days    CHIEF COMPLAINT:  Metastatic lobular breast cancer, ESRD, anemia, CHF, aortic stenosis    SUBJECTIVE:   Patient had a difficult time overnight, dialysis lasted quite some time and she did not get much rest.  She is obviously very tired today.  Her  is at the bedside.    Interval history:  11/6/2023  The patient is to be transferred to palliative care today.  She remains lethargic and confused.  Nephrology has also seen patient with plans to discontinue dialysis and transition to comfort care.      ROS:  Review of Systems   A comprehensive review of systems was obtained with pertinent positive findings as noted in the interval history above.  All other systems negative.      OBJECTIVE:  Vitals:    11/06/23 0500 11/06/23 0600 11/06/23 0700 11/06/23 0740   BP:  98/64 92/76    Pulse: 94 106 96    Resp:       Temp:  98.4 °F (36.9 °C)  97.4 °F (36.3 °C)   TempSrc:  Axillary  Oral   SpO2: 99% 94%     Weight:  (!) 154 kg (340 lb 9.8 oz)     Height:             PHYSICAL EXAMINATION:  General: Alert and oriented x3 no distress lying in bed  Chest/Lungs: Clear to auscultation bilaterally anteriorly  Heart: Regular rate and rhythm with 2/6 systolic murmur  Abdomen/GI: Distended, nontender, bowel sounds present  Extremities: No edema      DIAGNOSTIC DATA:  Results Review:     I reviewed the patient's new clinical results.    Results from last 7 days   Lab Units 11/05/23 0644 11/04/23 0429 11/03/23  0632   WBC 10*3/mm3 7.58 6.81 6.89   HEMOGLOBIN g/dL 9.2* 8.5* 7.7*   HEMATOCRIT % 28.1* 26.9* 24.2*   PLATELETS 10*3/mm3 161 174 217      Results from last 7 days   Lab Units 11/05/23  0644 11/04/23  0429 11/03/23  0632 11/02/23  1316   SODIUM mmol/L 136 132* 132* 133*   POTASSIUM mmol/L 4.9 5.8* 5.4* 4.8   CHLORIDE mmol/L 99 95* 96* 95*   CO2 mmol/L 22.0 25.0 23.0 25.7   BUN mg/dL 23 38* 35* 33*   CREATININE mg/dL 4.99* 7.05* 6.78* 6.52*    CALCIUM mg/dL 7.9* 7.8* 7.8* 8.1*   BILIRUBIN mg/dL  --   --   --  0.6   ALK PHOS U/L  --   --   --  122*   ALT (SGPT) U/L  --   --   --  17   AST (SGOT) U/L  --   --   --  24   GLUCOSE mg/dL 88 112* 122* 156*      Lab Results   Component Value Date    NEUTROABS 6.67 11/05/2023                 Assessment & Plan   ASSESSMENT/PLAN:  This is a 63 y.o. female with:     *Metastatic lobular breast cancer  The patient has a long history of breast cancer dating back to her original diagnosis with stage IIIC disease in 2003.  She received neoadjuvant chemotherapy in the [facility] system and underwent bilateral mastectomies 1/22/2004 with right axillary dissection with a residual 10 to 12 cm invasive lobular carcinoma of the right breast with 14/16 lymph nodes positive.  She received adjuvant chemotherapy and radiation.  She received tamoxifen adjuvantly with subsequent transition to Femara, stopped adjuvant endocrine therapy in June 2014.    Patient was found to have metastatic disease with carcinomatosis identified on scans in June 2019.  Omental biopsy 7/30/2019 showed metastatic lobular breast cancer that was ER positive, MS positive, HER2/tj negative.    She was treated initially for metastatic disease with Aromasin and Ibrance beginning in August 2019.  Patient experienced significant difficulty with myelosuppression from Ibrance and required dose alterations.    She developed progressive disease in November 2021, was continued on Ibrance with transition from Aromasin to Faslodex.    Patient experienced focal disease progression and received radiation therapy to L2 metastasis 5/23 - 6/7/2022 and continued on Ibrance and Faslodex.    Evidence of further progression on PET scan with new right sixth rib lesion and on 7/28/2022 transitioned from Ibrance to abemaciclib with continuation of Faslodex.    Patient was felt to be a poor candidate for chemotherapy given her underlying ESRD on dialysis and did not wish to pursue  chemotherapy.    Patient did require dose adjustment in abemaciclib due to myelosuppression.    Subsequent progression with further palliative radiation to left anterior acetabular lesion in May 2023.    Patient received most recent dose Faslodex (cycle 14-day 1) on 9/6/2023, 500 mg IM injection.  She had been continuing on abemaciclib at 100 mg twice daily.  Patient was hospitalized 9/28 - 9/30/2023 due to ongoing diarrhea.  CT abdomen and pelvis on 9/28/2023 that showed development of a large amount of ascites.  She was discharged prior to undergoing paracentesis.  Patient underwent outpatient paracentesis on 10/9/2023 with 11 L removed.  Cytology showed rare atypical cells with immunophenotype suspicious for metastatic lobular breast cancer, cells were ER/MI negative.  Patient was to have followed up with PET scan however was hospitalized again 10/13 - 10/26/2023.  She was experiencing weakness, dizziness with multiple falls.  She was hypotensive, required vasopressor support and ICU, was found to have UTI and ileus versus small bowel obstruction.  CT abdomen pelvis on 10/19/2023 during hospitalization showed reaccumulation of moderately large volume ascites, significantly thickened appearance of the gastric antrum suggestive of antritis or possibly metastatic disease to the gastric antrum.  Small left pleural effusion and new left lower lobe atelectasis.  She did have ongoing difficulty with anemia that required transfusion support.  Following discharge she was scheduled for outpatient PET scan and follow-up to discuss her progressive disease.  Patient is now admitted with recurrent hypotensive issues with dialysis.  She is requiring pressor and transfusion support.  Patient with progressive recent large volume ascites with cytology from peritoneal fluid on 10/9/2023 suspicious for involvement from her malignancy.  We know that she had carcinomatosis previously when she presented with metastatic disease.  It  appears that she is refractory to her most recent treatment with abemaciclib/Faslodex.  She has not received Faslodex since September.  Abemaciclib now discontinued.  We have exhausted the option for hormonal agents and it appears that her disease is ER/MN negative at this point by the recent cytology.  The only options at this point would be chemotherapy based.  As we have discussed in the past, with her underlying ESRD, there are significant limitations and options for treatment for single agent palliative chemotherapy and given her recent decline with current ECOG performance status of 2-3, she is not a candidate for chemotherapy.   On 11/4/2023, meeting with the patient, her , her sister, and her son.  Discussed that her metastatic breast cancer is progressing with worsening carcinomatosis producing ascites and also contributed to her recent ileus/partial obstruction during her last hospitalization.  She has known carcinomatosis identified at the time of her diagnosis of metastatic disease and cytology from peritoneal fluid on 10/9/2023 did show atypical cells that were felt to be consistent with lobular breast cancer that have become ER/MN negative.  We have tried to maintain treatment with endocrine therapy for a lengthy time however at this point her disease has become resistant and there are no further hormonal manipulations that would be effective.  The only other option is chemotherapy which would be problematic in terms of available drugs from the standpoint of her ESRD and more importantly her ability to tolerate treatment with her compromised ECOG performance status making risk of adverse effects from treatment very high.  Therefore I have recommended that we proceed with a palliative/supportive care course for her disease.  Patient and family were in agreement.  We discussed how to manage her medical issues on an ongoing basis in a palliative care sense.  She does not want to stop dialysis at  this point as this would lead to rapid decline.  Nephrology will plan to continue dialysis therefore for now.  With patient continuing on dialysis, full hospice care not available.  Plan to engage [facility] for home care with future transition to full hospice care when she makes the decision at some point to forego dialysis.  Consult [facility] on Monday when available.  Finally, we addressed her CODE STATUS and she would like to maintain her current level of medical care however is DNR and DNI.    Today the patient is quite fatigued after being up most of the night with dialysis.  It remains unclear how she will progress over time.  We are consulting palliative care/ [facility] tomorrow to discuss involvement in her home care until she would make the decision at some point to discontinue dialysis when she would be a candidate for full hospice care.  Patient seen at bedside with her son, plan to transfer to palliative care today     *ESRD on HD  Patient had been using home dialysis before recent hospitalization  Following recent hospitalization, patient had been undergoing outpatient intermittent dialysis Monday Wednesday Friday  Patient developed hypotension in dialysis 11/2/2023.  Accuracy of blood pressure measurement in question, patient felt to be normotensive currently.    Patient did undergo dialysis last night without difficulty  Assessed 11/6/2023, dialysis discontinued     *Multifactorial anemia  Patient with chronic anemia secondary to ESRD, malignancy, chronic disease, myelosuppression, previous GI blood loss.  Patient receives BEAU support from nephrology with dialysis.  Receives Mircera  every 4 weeks in the outpatient setting.  Patient has required intermittent transfusion support in the outpatient setting.  Recent anemia evaluation on 10/15/2023 with iron 46, ferritin 943, iron saturation 46%, TIBC 100  Patient continues on oral folic acid 1 mg daily and B12 1000 mcg daily  On 11/3/2023, hemoglobin 7.7 and  received 1 unit PRBC  Hemoglobin today improved at 9.2.  No current need for transfusion support.    *CHF with severe aortic stenosis:  Patient with progression to severe aortic stenosis in 2022  Cardiac catheterization 8/23/2022 showed normal coronary arteries, mild to moderate pulmonary hypertension.    She was evaluated by cardiothoracic surgery Dr. Pagan and there was discussion regarding potential candidacy for TAVR although there was concern given her underlying comorbidities including her metastatic breast cancer.  I discussed patient's prognosis with Dr. Pagan.  She does have opportunities for additional treatment of her malignancy with intravenous chemotherapy and is willing to pursue this in the future when needed.  It is unclear as to her expected survival however given her multiple severe comorbidities with metastatic breast cancer, ESRD on dialysis, severe aortic stenosis, severe anemia.  There is consideration of possible pursuit of balloon valvuloplasty initially to assess her symptomatic response and then consider pursuit of TAVR in the future.   Patient did undergo aortic valvuloplasty during admission 1/10 - 1/11/2023.  Echocardiogram on 10/14/2023 with ejection fraction 58.4%, severe aortic stenosis noted.    *Transient dysarthria  Patient has experienced similar symptoms in the past associated with hypotension  Patient was seen by neurology, did not feel that she was experiencing strokelike symptoms but did request MRI brain  Patient refused MRI brain, symptoms resolved.  Neurology has signed off.    *CODE STATUS  Patient is DNR/DNI but does wish to maintain her current level of medical support        PLAN:   Agree that treatment with Faslodex/abemaciclib discontinued as patient appears to have refractory disease with recent progression of carcinomatosis  Patient is not a candidate for palliative chemotherapy for metastatic breast cancer due to her comorbidities and compromised ECOG performance  status of 2-3.  Plan to pursue a palliative/supportive care approach for her malignancy  Patient is DNR/DNI, transfer to palliative care today.    Discussed with patient and her son at bedside             Tarik Diaz MD

## 2023-11-06 NOTE — PROGRESS NOTES
"   LOS: 4 days     Chief Complaint/ Reason for encounter: ESRD, discussions about dialysis continuation    Subjective   11/06/23 : Interval events over the weekend noted  Lengthy discussion with patient, her  and her son at bedside  Extensive chart review  We discussed that her symptoms are due to progressive cancer which has continued to progress despite immunotherapy.  Immunotherapy has now been discontinued.  She remains hypotensive, pressor dependent  She had difficulty with dialysis over the weekend  Long discussion, I expressed my opinion that it would be very unlikely to get her off pressors and very unlikely that she would ever be able to be stable enough to go home and safely receive hemodialysis at home again  She appears to be at the end stages of her metastatic breast cancer and discontinuation of dialysis and full transition to comfort care is most appropriate at this time  Patient agrees to stop pressors, not on comfort related medications, stop labs and stop dialysis  Discussed with Dr. Pennington who will put in transfer orders for inpatient hospice      Medical history reviewed:  History of Present Illness    Subjective    History taken from: Patient and chart    Vital Signs  Temp:  [97.4 °F (36.3 °C)-98.6 °F (37 °C)] 97.4 °F (36.3 °C)  Heart Rate:  [] 96  Resp:  [16-18] 16  BP: ()/(48-76) 92/76       Wt Readings from Last 1 Encounters:   11/06/23 0600 (!) 154 kg (340 lb 9.8 oz)   11/04/23 2319 (!) 156 kg (343 lb 14.7 oz)   11/04/23 0600 (!) 158 kg (348 lb 8.8 oz)   11/02/23 2017 (!) 157 kg (345 lb 7.4 oz)   11/02/23 0952 (!) 158 kg (348 lb 1.7 oz)       Objective:  Vital signs: (most recent): Blood pressure 92/76, pulse 96, temperature 97.4 °F (36.3 °C), temperature source Oral, resp. rate 16, height 170.2 cm (67\"), weight (!) 154 kg (340 lb 9.8 oz), SpO2 94%, not currently breastfeeding.                Objective:  General Appearance:  Comfortable, ill-appearing, in mild distress " from pain and nausea  HEENT: Mucous membranes moist, no injury, oropharynx clear  Lungs:  Normal effort and normal respiratory rate.      Heart: Normal rate.  Regular rhythm.  S1, S2 normal.  No murmur.   Abdomen: Abdomen is soft.  Diminished bowel sounds, mildly tender to palpation  Extremities: 1-2+ edema of bilateral lower extremities  Neurological: No focal motor or sensory deficits, pupils reactive  Skin:  Warm and dry.  No rash or cyanosis.       Results Review:    Intake/Output:     Intake/Output Summary (Last 24 hours) at 11/6/2023 0935  Last data filed at 11/6/2023 0609  Gross per 24 hour   Intake 1196 ml   Output --   Net 1196 ml         DATA:  Radiology and Labs:  The following labs independently reviewed by me. Additional labs ordered for tomorrow a.m.  Interval notes, chart personally reviewed by me.   Old records independently reviewed showing progressive metastatic breast cancer, now end-stage with severe hypotension, refractory GI symptoms due to peritoneal carcinomatosis  The following radiologic studies independently viewed by me, findings chest x-ray showed atelectasis versus infiltrate and CT abdomen pelvis showed reaccumulation of ascites, thickened gastric antrum with she is contracted suggesting of antritis from metastatic disease, atrophic kidneys noted small left pleural effusion  New problems include refractory nausea and hypotension, all related to her progressive metastatic breast cancer  Discussed with patient, her  and her son about goals of care, lengthy discussion  Discussed with her RN, also discussed with Dr. Pennington    Risk/ complexity of medical care/ medical decision making very high risk regarding the delicate nature of the discussions about discontinuation of dialysis and transitioning to end-of-life comfort care and full palliative measures in the inpatient setting.  Discussed home versus inpatient hospice with inpatient most appropriate at this time given limited life  expectancy      Labs:   Recent Results (from the past 24 hour(s))   POC Glucose Once    Collection Time: 11/05/23 12:34 PM    Specimen: Blood   Result Value Ref Range    Glucose 80 70 - 130 mg/dL   POC Glucose Once    Collection Time: 11/05/23  5:57 PM    Specimen: Blood   Result Value Ref Range    Glucose 53 (L) 70 - 130 mg/dL   POC Glucose Once    Collection Time: 11/05/23  6:27 PM    Specimen: Blood   Result Value Ref Range    Glucose 118 70 - 130 mg/dL   POC Glucose Once    Collection Time: 11/05/23  9:14 PM    Specimen: Blood   Result Value Ref Range    Glucose 97 70 - 130 mg/dL   POC Glucose Once    Collection Time: 11/06/23 12:56 AM    Specimen: Blood   Result Value Ref Range    Glucose 84 70 - 130 mg/dL   POC Glucose Once    Collection Time: 11/06/23  5:10 AM    Specimen: Blood   Result Value Ref Range    Glucose 52 (L) 70 - 130 mg/dL   POC Glucose Once    Collection Time: 11/06/23  6:34 AM    Specimen: Blood   Result Value Ref Range    Glucose 96 70 - 130 mg/dL   POC Glucose Once    Collection Time: 11/06/23  7:37 AM    Specimen: Blood   Result Value Ref Range    Glucose 84 70 - 130 mg/dL       Radiology:  Pertinent radiology studies were reviewed as described above      Medications have been reviewed separately in chart overview      ASSESSMENT:  ESRD with difficulties with dialysis over the weekend due to hypotension.  Likely not safe to continue dialysis at this time  Hyperkalemia, better with HD  Acute on chronic anemia, from renal failure and immunotherapy    Hypotension, refractory, on pressors/midodrine    Diabetes type 2, controlled    Acquired hypothyroidism    Primary malignant neoplasm of breast with metastasis    Anxiety and depression, on SSRI    Anemia, chronic disease    Morbid obesity with BMI of 50.0-59.9, adult    Severe aortic stenosis    Combined systolic and diastolic congestive heart failure           DISCUSSION/PLAN:   Long discussion with patient and family at bedside about goals of  care  Noted discussions with oncology regarding progression of her malignancy and plans to discontinue all immunotherapy at this time  She is having progressive symptoms and is very uncomfortable  I do not expect that her blood pressure will ever normalize to the point that she could safely go home and perform dialysis in outpatient setting  Life expectancy with dialysis probably only a matter of weeks versus a matter of days with dialysis with poor quality of life during that time.    After these discussions, patient and her family are agreeable to stopping all medications that are not palliative in nature, stopping dialysis, pressors  They prefer for no additional lab draws  They agree to transfer to inpatient hospice with palliative care to see later today  Patient's goals are to return home but I do not see that she would ever be stable enough to do that given her hypotension  Dr. Pennington will enter palliative care transfer order set and [facility] will resume care  No additional transfusions  she wishes to stop pressors and midodrine    Over 40 minutes critical care time spent, greater than 50% with discussion regarding dialysis, goals of care, end-of-life care, discussion with family, RN and additional physicians caring for her today    Tai Antonio MD  [facility]   Office phone number: [phone]  Answering service phone number: [phone]    11/06/23  09:35 EST    Dictation performed using Dragon dictation software

## 2023-11-06 NOTE — CONSULTS
Patient transferred to the palliative care unit following goals of care and treatment preferences discussion with Dr. Antonio.  Patient and/or family state goal of care to be comfort and treatment preferences to be geared toward symptom management and quality of life.  The palliative care team will  follow for further support or discussion as needed.

## 2023-11-06 NOTE — PROGRESS NOTES
[facility] Chart Review    MD dictation noted:   Treatment with Faslodex/abemaciclib discontinued as patient appears to have refractory disease with recent progression of carcinomatosis  Patient is not a candidate for palliative chemotherapy for metastatic breast cancer due to her comorbidities and compromised ECOG performance status of 2-3.  Plan to pursue a palliative/supportive care approach for her malignancy      Patient disenrolled from [facility] oncology program and Verzenio prescription discontinued.  Pharmacy will be notified, so patient is not contacted for refills.      Darlene España RPH  11/6/2023  08:53 EST

## 2023-11-06 NOTE — PLAN OF CARE
Goal Outcome Evaluation:   Pt remains in the CICU. On 3 L nasal cannula. Very lethargic and confused at the beginning of the shift. Manual blood pressure cuff reading 60s-70s systolic. Phenylephrine drip titrated up. Pt became more responsive and oriented but still getting poor readings from manual blood pressure. Terra ALBERT called to bedside to evaluate with concerns of blood pressure accuracy. Also discussed concerns of recent episodes of vomiting after swallowing pills on past few medication administrations including after midodrine doses. Discussed goals of care at bedside; pt would still like to refuse any further attempts at a-line placement and any further CT testing. Systolic blood pressure reading slowly improving to 80s-90s at  this time. Nursing to continue to monitor blood pressures and pt LOC and mental status and titrate kassandra drip as needed. Pt agreeable to further vasopressor support if needed. Phenylephrine currently at 2 mcg/kg/min with no further occurrences overnight. One occurrence of hypoglycemia this AM; one amp D50 given.

## 2023-11-06 NOTE — PROGRESS NOTES
LOS: 4 days   Patient Care Team:  Kiana Noriega APRN ([place]) as PCP - General (Family Medicine)  Pasha Harkins MD as Referring Physician (Cardiology)  Leodan Irvin Jr., MD as Consulting Physician (Hematology and Oncology)  Tai Antonio MD as Consulting Physician (Nephrology)  Luis Rivers MD as Consulting Physician (Cardiology)  Leodan Mckee Jr., MD as Consulting Physician (Urology)    Subjective     Patient new to me today I am picking up pulmonary critical care coverage from Dr. Pedro Luis Sykes have reviewed his records patient and with hypotension/shock she has end-stage renal disease on hemodialysis she has metastatic breast cancer, severe aortic stenosis with prior balloon valvuloplasty and chronic diastolic CHF.  Dr. Antonio just spoke with her he is followed her for years for her renal disease and she told him that she was ready to stop dialysis and go full palliative measures oncology is already spoken with her and and they have discontinued therapy due to refractory disease and do not feel she is a candidate for further therapy.  I did confirm with patient she does want to discontinue dialysis and pursue full palliative/hospice care her  and family are with her at bedside and concur.  Her biggest complaint right now is nausea no abdominal pain, no chest pain or shortness of breath.  Review of Systems:          Objective     Vital Signs  Vital Sign Min/Max for last 24 hours  Temp  Min: 97.4 °F (36.3 °C)  Max: 98.6 °F (37 °C)   BP  Min: 70/48  Max: 102/62   Pulse  Min: 88  Max: 115   Resp  Min: 16  Max: 18   SpO2  Min: 90 %  Max: 100 %   Flow (L/min)  Min: 3  Max: 4   Weight  Min: 154 kg (340 lb 9.8 oz)  Max: 154 kg (340 lb 9.8 oz)        Ventilator/Non-Invasive Ventilation Settings (From admission, onward)      None                         Body mass index is 53.33 kg/m².  I/O last 3 completed shifts:  In: 1830.2 [P.O.:480; I.V.:1350.2]  Out: 300   No  intake/output data recorded.        Physical Exam:  General Appearance: Morbidly obese white female with a BMI of over 53 resting in bed on 2 L O2 does not look in acute distress  Eyes: Conjunctiva are clear and anicteric pupils look equal  ENT: Mucous membranes are little dry no exudates  Neck: Obese trachea midline no visible jugular venous distention  Lungs: Shallow respirations but clear  Cardiac: Regular rate and rhythm there is a loud harsh murmur heard across the precordium  Abdomen: Obese tense does not appear to be tender do not palpate  definite masses or hepatosplenomegaly  : Not examined  Musculoskeletal: Mild thoracic kyphosis  Skin: Warm dry no jaundice no petechiae  Neuro: She is alert and oriented she is cooperative  Extremities/P Vascular: No clubbing no cyanosis she has trace lower extremity edema palpable radial and dorsalis pedis pulses  MSE: She was very appropriate       Labs:  Results from last 7 days   Lab Units 11/05/23  0644 11/04/23  0429 11/03/23  0632 11/02/23  1316   GLUCOSE mg/dL 88 112* 122* 156*   SODIUM mmol/L 136 132* 132* 133*   POTASSIUM mmol/L 4.9 5.8* 5.4* 4.8   CO2 mmol/L 22.0 25.0 23.0 25.7   CHLORIDE mmol/L 99 95* 96* 95*   ANION GAP mmol/L 15.0 12.0 13.0 12.3   CREATININE mg/dL 4.99* 7.05* 6.78* 6.52*   BUN mg/dL 23 38* 35* 33*   BUN / CREAT RATIO  4.6* 5.4* 5.2* 5.1*   CALCIUM mg/dL 7.9* 7.8* 7.8* 8.1*   ALK PHOS U/L  --   --   --  122*   TOTAL PROTEIN g/dL  --   --   --  6.1   ALT (SGPT) U/L  --   --   --  17   AST (SGOT) U/L  --   --   --  24   BILIRUBIN mg/dL  --   --   --  0.6   ALBUMIN g/dL  --   --   --  2.1*   GLOBULIN gm/dL  --   --   --  4.0     Estimated Creatinine Clearance: 17.6 mL/min (A) (by C-G formula based on SCr of 4.99 mg/dL (H)).      Results from last 7 days   Lab Units 11/05/23  0644 11/04/23  0429 11/03/23  0632 11/02/23  1144   WBC 10*3/mm3 7.58 6.81 6.89 6.66   RBC 10*6/mm3 3.00* 2.78* 2.51* 2.73*   HEMOGLOBIN g/dL 9.2* 8.5* 7.7* 8.3*    HEMATOCRIT % 28.1* 26.9* 24.2* 26.4*   MCV fL 93.7 96.8 96.4 96.7   MCH pg 30.7 30.6 30.7 30.4   MCHC g/dL 32.7 31.6 31.8 31.4*   RDW % 17.0* 17.0* 16.4* 16.0*   RDW-SD fl 57.6* 59.4* 57.6* 55.8*   MPV fL 11.0 10.5 10.7 10.4   PLATELETS 10*3/mm3 161 174 217 235   NEUTROPHIL % %  --   --   --  82.3*   LYMPHOCYTE % %  --   --   --  9.9*   MONOCYTES % %  --   --   --  4.5*   EOSINOPHIL % %  --   --   --  1.1   BASOPHIL % %  --   --   --  0.5   IMM GRAN % %  --   --   --  1.7*   NEUTROS ABS 10*3/mm3 6.67  --   --  5.49   LYMPHS ABS 10*3/mm3  --   --   --  0.66*   MONOS ABS 10*3/mm3  --   --   --  0.30   EOS ABS 10*3/mm3  --   --   --  0.07   BASOS ABS 10*3/mm3  --   --   --  0.03   IMMATURE GRANS (ABS) 10*3/mm3  --   --   --  0.11*   NRBC /100 WBC 0.8*  --   --  0.3*         Results from last 7 days   Lab Units 11/02/23  1520 11/02/23  1316   HSTROP T ng/L 70* 62*         Results from last 7 days   Lab Units 11/02/23  1225   TSH uIU/mL 6.080*   FREE T4 ng/dL 0.78*             Microbiology Results (last 10 days)       ** No results found for the last 240 hours. **                atorvastatin, 40 mg, Oral, Nightly  folic acid, 1 mg, Oral, Daily  gabapentin, 300 mg, Oral, Nightly  levothyroxine, 150 mcg, Oral, Q AM  LORazepam, 1 mg, Intravenous, Once  Menthol-Zinc Oxide, 1 application , Topical, 4x Daily  miconazole, , Topical, Q12H  midodrine, 10 mg, Oral, TID AC  multivitamin, 1 tablet, Oral, Daily  pantoprazole, 40 mg, Oral, QAM  Scopolamine, 1 patch, Transdermal, Q72H  senna-docusate sodium, 2 tablet, Oral, BID  sertraline, 150 mg, Oral, Daily  vitamin B-12, 1,000 mcg, Oral, Daily      phenylephrine, 0.5-3 mcg/kg/min, Last Rate: 2 mcg/kg/min (11/06/23 0453)        Diagnostics:  XR Chest 1 View    Result Date: 11/2/2023  XR CHEST 1 VW-  HISTORY: Female who is 63 years-old, low blood pressure  TECHNIQUE: Frontal view of the chest  COMPARISON: 10/13/2023  FINDINGS: The heart size is borderline. Pulmonary vasculature  shows mild central prominence. Small left basilar atelectasis or infiltrate. Minimal left pleural effusion. No pneumothorax. No acute osseous process.      Small left basilar atelectasis or infiltrate, minimal left pleural effusion. Borderline heart size with mild central vascular prominence  This report was finalized on 11/2/2023 2:14 PM by Dr. Jd Serna M.D on Workstation: [id number]      CT Abdomen Pelvis With Contrast    Result Date: 10/20/2023  CT ABDOMEN AND PELVIS WITH IV CONTRAST  HISTORY: 63-year-old female with metastatic breast cancer. Abdominal pain. Ultrasound-guided paracentesis removed 11 L of fluid on 10/09/2023.  TECHNIQUE: Radiation dose reduction techniques were utilized, including automated exposure control and exposure modulation based on body size. 3 mm images were obtained through the abdomen and pelvis after the administration of IV contrast. Compared with previous CT 09/28/2023.  FINDINGS: 1. There has been reaccumulation of a moderately large volume of ascites throughout the abdomen and pelvis, slightly smaller in volume than previously.  2. There is a significantly thickened appearance of the gastric antrum which is contracted. Appearance is suggestive of antritis or possibly metastatic disease to the gastric antrum. Duodenum is collapsed and there is mild dilatation of a segment of jejunum, but there is no small or large bowel obstruction. The colon is nearly completely collapsed.  3. No acute abnormality is seen at the liver, gallbladder, spleen, pancreas, adrenals, or atrophied kidneys. Left ureteral stent remains in place and there is no hydronephrosis. No change in the appearance of the uterus and adnexa.  4. There is a new very small left pleural effusion and new left lower lobe atelectasis. There is no significant change in the appearance of the T11 sclerotic metastasis.  This report was finalized on 10/20/2023 7:46 AM by Dr. Lety Alicea M.D on Workstation: [id number]       XR Abdomen KUB    Addendum Date: 10/17/2023    ADDENDUM: Additional images of the abdomen were subsequently obtained after the initial interpretation, with improved visualization of bowel loops. Gas and stool are present in the rectum, and abundant stool in the rectum could be evidence of rectal stool impaction, correlate clinically. No supine evidence for free intraperitoneal gas. Small bowel in the left aspect of the abdomen is mildly dilated that may reflect ileus, although the possibility of early or partial small bowel obstruction is not excluded, close follow-up advised as indications persist. If further imaging evaluation is indicated, CT could be obtained. NG tube extends of the left aspect of the stomach. A left ureteral stent is in place.  This report was finalized on 10/17/2023 6:26 PM by Dr. Jd Serna M.D on Workstation: [id number]      Result Date: 10/17/2023  XR ABDOMEN KUB-10/17/2023  HISTORY: Ileus. Now passing gas.  4 images are submitted. The images are somewhat underpenetrated due to patient's size. Double-J left ureteral stent is seen in good position. There is moderate amount of bowel gas some which appears to be in small bowel segments which may be at the upper limits of normal for diameter. No significant bowel dilatation is thought to be present. There are degenerative changes in the spine. NG tube courses into the stomach.      1. Limited study due to patient's body habitus. A few segments of small bowel are at the upper limits of normal for diameter. 2. No definite bowel dilatation is seen.   This report was finalized on 10/17/2023 4:25 PM by Dr. Onofre Rodriguez M.D on Workstation: [id number]      XR Chest 1 View    Result Date: 10/17/2023  XR FOOT 3+ VW LEFT-, XR CHEST 1 VW-  HISTORY: 63-year-old female status post multiple falls in the last few days.  FINDINGS: LEFT FOOT: There are acute/subacute appearing fractures of the heads of the 3rd, 4th, and 5th metatarsals. There is  possibly a fracture at the base of the 5th proximal phalanx. There is soft tissue swelling throughout most of the forefoot and the ventral aspect of the midfoot as well. There is contour deformity at the proximal metadiaphysis of the 4th metatarsal which may be related to an old fracture, but an acute fracture cannot be excluded. In addition, there is an ossific density between the frontal aspect of the talus and the navicular bone which may represent an accessory ossicle, but if there is focal tenderness in this region, this may represent an avulsion fragment.  CHEST: The heart is enlarged. There is no CHF. There is a small airspace opacity at the medial aspect of the right lower lobe which is partly obscured by EKG wires. A small pneumonia cannot be excluded. Repeat frontal radiograph is suggested.  This report was finalized on 10/17/2023 6:52 AM by Dr. Lety Alicea M.D on Workstation: [id number]      XR Foot 3+ View Left    Result Date: 10/17/2023  XR FOOT 3+ VW LEFT-, XR CHEST 1 VW-  HISTORY: 63-year-old female status post multiple falls in the last few days.  FINDINGS: LEFT FOOT: There are acute/subacute appearing fractures of the heads of the 3rd, 4th, and 5th metatarsals. There is possibly a fracture at the base of the 5th proximal phalanx. There is soft tissue swelling throughout most of the forefoot and the ventral aspect of the midfoot as well. There is contour deformity at the proximal metadiaphysis of the 4th metatarsal which may be related to an old fracture, but an acute fracture cannot be excluded. In addition, there is an ossific density between the frontal aspect of the talus and the navicular bone which may represent an accessory ossicle, but if there is focal tenderness in this region, this may represent an avulsion fragment.  CHEST: The heart is enlarged. There is no CHF. There is a small airspace opacity at the medial aspect of the right lower lobe which is partly obscured by EKG wires. A small  pneumonia cannot be excluded. Repeat frontal radiograph is suggested.  This report was finalized on 10/17/2023 6:52 AM by Dr. Lety Alicea M.D on Workstation: [id number]      XR Abdomen KUB    Result Date: 10/15/2023  KUB  HISTORY: Nasogastric tube placement  COMPARISON: October 15, 2023  FINDINGS: Nasogastric tube appears to extend into the proximal stomach. Gaseous distention of the stomach appears improved.    This report was finalized on 10/15/2023 9:39 PM by Dr. Gricelda Sprague M.D on Workstation: [id number]      XR Abdomen KUB    Result Date: 10/15/2023  KUB  HISTORY: Nasogastric tube placement  COMPARISON: October 15, 2023  FINDINGS: The patient's nasogastric tube is coiled upon itself within the esophagus. Removal and replacement is recommended. Gaseous distention of multiple bowel loops is noted.  FINDINGS were called to the patient's nurse at 8:58 p.m.  This report was finalized on 10/15/2023 8:59 PM by Dr. Gricelda Sprague M.D on Workstation: [id number]      XR Abdomen KUB    Result Date: 10/15/2023  PROCEDURE:  XR ABDOMEN KUB-  HISTORY: Nausea/vomiting.  COMPARISON: CT abdomen and pelvis 9/28/2023  FINDINGS:   4 views of the abdomen/pelvis were obtained. Limited examination due to poor penetration related to patient body habitus. There is gaseous distention of the stomach. There are dilated air-filled loops of small bowel in the left lower quadrant, measuring up to approximate 3.7 cm. These are nonspecific and could be due to ileus or obstruction in the appropriate clinical setting. Consider further evaluation with CT, as clinically directed. A left nephroureteral stent is only faintly visualized.  This report was finalized on 10/15/2023 3:52 PM by Dr. Asya Rush M.D on Workstation: [id number]      Adult Transthoracic Echo Complete W/ Cont if Necessary Per Protocol    Result Date: 10/15/2023    Left ventricular systolic function is normal. Calculated left ventricular EF = 58.4%   Left  ventricular diastolic function was normal.   Severe aortic valve stenosis is present. Aortic valve area is 0.9 cm2.   Peak velocity of the flow distal to the aortic valve is 469.1 cm/s. Aortic valve maximum pressure gradient is 88 mmHg. Aortic valve mean pressure gradient is 48 mmHg. Aortic valve dimensionless index is 0.3 .   Estimated right ventricular systolic pressure from tricuspid regurgitation is moderately elevated (45-55 mmHg). Calculated right ventricular systolic pressure from tricuspid regurgitation is 46 mmHg.   There is mild plaque in the aortic arch present.     CT Head Without Contrast    Result Date: 10/13/2023  EMERGENCY CT SCAN OF THE HEAD WITHOUT CONTRAST ON 10/13/2023  CLINICAL HISTORY: Patient has weakness and has had multiple falls over the last 4 days.  TECHNIQUE: Spiral CT images were obtained from the base of the skull to the vertex without intravenous contrast. The images were reformatted and are submitted in 3 mm thick axial, sagittal and coronal CT sections with brain algorithm and 2 mm thick axial CT sections with high-resolution bone algorithm.  There are no prior head CTs for comparison.  FINDINGS: There is some mild low-density in the periventricular white matter consistent with mild small vessel disease. The remainder of the brain parenchyma is normal in attenuation. The ventricles are normal in size. I see no focal mass effect. There is no midline shift. No extra-axial fluid collections are identified and there is no evidence of acute intracranial hemorrhage. No acute skull fracture is identified. The calvarium and skull base are normal in appearance. Paranasal sinuses, mastoid air cells and middle ear cavities are clear.       1. No acute intracranial abnormality is identified. Other than mild small vessel disease in the cerebral white matter, the remainder the head CT is within normal limits.  Radiation dose reduction techniques were utilized, including automated exposure control  and exposure modulation based on body size.   This report was finalized on 10/13/2023 4:37 PM by Dr. Sandeep Irwin M.D on Workstation: [id number]       Paracentesis    Result Date: 10/9/2023  IMAGE GUIDED PARACENTESIS  HISTORY:  ascites  TECHNIQUE: Informed consent was obtained and documented. The patient was placed in a supine position. After preparatory ultrasound scan and image recording, the left lower quadrant was prepped and draped in the usual aseptic manner. 1% lidocaine was infiltrated at the designated puncture site. A 6 Green[place]ic paracentesis catheter was then inserted into the ascites using trocar technique . A total of 11 L fluid were drained with specimen sent to lab. The catheter was then removed. Manual pressure was held. Sterile dressing was applied. There were no immediate complications. All elements of maximal sterile technique were followed.       Successful ultrasound-guided paracentesis.   This report was finalized on 10/9/2023 2:53 PM by Dr. Justin Mederos M.D on Workstation: [id number]      Results for orders placed during the hospital encounter of 10/13/23    Adult Transthoracic Echo Complete W/ Cont if Necessary Per Protocol    Interpretation Summary    Left ventricular systolic function is normal. Calculated left ventricular EF = 58.4%    Left ventricular diastolic function was normal.    Severe aortic valve stenosis is present. Aortic valve area is 0.9 cm2.    Peak velocity of the flow distal to the aortic valve is 469.1 cm/s. Aortic valve maximum pressure gradient is 88 mmHg. Aortic valve mean pressure gradient is 48 mmHg. Aortic valve dimensionless index is 0.3 .    Estimated right ventricular systolic pressure from tricuspid regurgitation is moderately elevated (45-55 mmHg). Calculated right ventricular systolic pressure from tricuspid regurgitation is 46 mmHg.    There is mild plaque in the aortic arch present.          Active Hospital Problems    Diagnosis  POA    **Hypotension [I95.9]  Yes     Combined systolic and diastolic congestive heart failure [I50.40]  Yes    Severe aortic stenosis [I35.0]  Yes    ESRD (end stage renal disease) [N18.6]  Yes    Morbid obesity with BMI of 50.0-59.9, adult [E66.01, Z68.43]  Not Applicable    Anemia, chronic disease [D63.8]  Yes    Anxiety and depression [F41.9, F32.A]  Yes    Primary malignant neoplasm of breast with metastasis [C50.919]  Yes    Acquired hypothyroidism [E03.9]  Yes    Diabetes type 2, controlled [E11.9]  Yes      Resolved Hospital Problems   No resolved problems to display.         Assessment & Plan     End-stage renal disease patient has decided to stop dialysis and full palliative measures  Metastatic breast carcinoma with carcinomatosis she has been refractory to treatment and oncology feels no further treatment options available given her very poor performance status  Severe aortic stenosis she has had a prior prior balloon valvuloplasty  Super morbid obesity with a BMI greater than 53  Chronic heart failure preserved ejection fraction  Pulmonary hypertension moderate by echocardiogram  Hypothyroidism history of replacement  Diabetes mellitus type 2  Peripheral neuropathy  Nausea vomiting secondary to her anesthetic cancer we will try and treat  Psoriasis  Anemia chronic disease  Transferred to the palliative care unit and full palliative measures consult hospice, notified [facility] of transfer out for them to resume primary care    Plan for disposition:    Joaquin Pennington Jr, MD  11/06/23  07:21 EST    Time: I spent 34 minutes on patient's care today

## 2023-11-07 NOTE — PROGRESS NOTES
"DAILY PROGRESS NOTE  [facility]    Patient Identification:  Name: Juana Hall  Age: 63 y.o.  Sex: female  :  1960  MRN: [id number]         Primary Care Physician: Kiana oNriega), BETY      Subjective  Patient is nonverbal.  Multiple family members and friends are at bedside.  They note that she has been very comfortable this afternoon.    Objective:  General Appearance:  Comfortable, in no acute distress and not in pain.    Vital signs: (most recent): Blood pressure 92/76, pulse 97, temperature 98.8 °F (37.1 °C), resp. rate 16, height 170.2 cm (67\"), weight (!) 154 kg (340 lb 9.8 oz), SpO2 94%, not currently breastfeeding.    Lungs:  Normal effort and bradypnea.    Neurological: (Nonresponsive.).    Skin:  Warm and dry.                  Vital signs in last 24 hours:  Temp:  [97.4 °F (36.3 °C)-98.8 °F (37.1 °C)] 98.8 °F (37.1 °C)  Heart Rate:  [] 97  BP: ()/(48-76) 92/76    Intake/Output:    Intake/Output Summary (Last 24 hours) at 2023 1903  Last data filed at 2023 0609  Gross per 24 hour   Intake 891.84 ml   Output --   Net 891.84 ml         Results from last 7 days   Lab Units 23  0623  1144   WBC 10*3/mm3 7.58 6.81 6.89 6.66   HEMOGLOBIN g/dL 9.2* 8.5* 7.7* 8.3*   PLATELETS 10*3/mm3 161 174 217 235     Results from last 7 days   Lab Units 23  0644 23  1316   SODIUM mmol/L 136 132* 132* 133*   POTASSIUM mmol/L 4.9 5.8* 5.4* 4.8   CHLORIDE mmol/L 99 95* 96* 95*   CO2 mmol/L 22.0 25.0 23.0 25.7   BUN mg/dL 23 38* 35* 33*   CREATININE mg/dL 4.99* 7.05* 6.78* 6.52*   GLUCOSE mg/dL 88 112* 122* 156*   Estimated Creatinine Clearance: 17.6 mL/min (A) (by C-G formula based on SCr of 4.99 mg/dL (H)).  Results from last 7 days   Lab Units 23  0644 23  0429 23  0632 23  1316   CALCIUM mg/dL 7.9* 7.8* 7.8* 8.1*   ALBUMIN g/dL  --   --   --  2.1*     Results " from last 7 days   Lab Units 11/02/23  1316   ALBUMIN g/dL 2.1*   BILIRUBIN mg/dL 0.6   ALK PHOS U/L 122*   AST (SGOT) U/L 24   ALT (SGPT) U/L 17       Assessment:    Hypotension    Diabetes type 2, controlled    Acquired hypothyroidism    Primary malignant neoplasm of breast with metastasis    Anxiety and depression    Anemia, chronic disease    Morbid obesity with BMI of 50.0-59.9, adult    ESRD (end stage renal disease)    Severe aortic stenosis    Combined systolic and diastolic congestive heart failure    All problems new to this examiner.    Plan:  Continue comfort measures.    Kosta Bedolla MD  11/6/2023  19:03 EST

## 2023-11-07 NOTE — CASE MANAGEMENT/SOCIAL WORK
Case Management Discharge Note      Final Note: Patient passed away on 2023 at 7:32PM.         Selected Continued Care - Discharged on 2023 Admission date: 2023 - Discharge disposition:       Destination    No services have been selected for the patient.                Durable Medical Equipment    No services have been selected for the patient.                Dialysis/Infusion    No services have been selected for the patient.                Home Medical Care    No services have been selected for the patient.                Therapy    No services have been selected for the patient.                Community Resources    No services have been selected for the patient.                Community & DME    No services have been selected for the patient.                    Selected Continued Care - Prior Encounters Includes continued care and service providers with selected services from prior encounters from 2023 to 2023      Discharged on 10/26/2023 Admission date: 10/13/2023 - Discharge disposition: Skilled Nursing Facility (DC - External)      Destination       Service Provider Selected Services Address Phone Fax Patient Preferred    [facility] [address] [place] [address] [phone] --                               Final Discharge Disposition Code: 41 -  in medical facility

## 2023-11-07 NOTE — ED PROVIDER NOTES
EMERGENCY DEPARTMENT ENCOUNTER    Room Number:  P489/1  PCP: Kiana Noriega APRN ([place])      HPI:  Chief Complaint: Low blood pressure  A complete HPI/ROS/PMH/PSH/SH/FH are unobtainable due to: None  Context: Juana Hall is a 63 y.o. female who presents to the ED c/o low blood pressure.  Patient was sent to the ER for low blood pressure.  She was sent from dialysis center due to having bradycardia and hypotension.  However, upon EMS arrival she had normal heart rate and blood pressure.  Patient denies having any complaints at this time.  No chest pain or shortness of breath.  No fever, cough, vomiting.          PAST MEDICAL HISTORY  Active Ambulatory Problems     Diagnosis Date Noted    Anxiety     Depression     Diabetes type 2, controlled     Hyperlipidemia     Heart murmur     Hypertension     Post-traumatic osteoarthritis of multiple joints     Psoriasis     Radiculopathy     Acquired hypothyroidism 09/26/2016    Peripheral neuropathy 02/25/2019    Normocytic anemia 07/22/2019    History of right breast cancer 07/22/2019    Omental mass 07/22/2019    Primary malignant neoplasm of breast with metastasis 08/12/2019    Elevated serum creatinine 09/10/2019    Iron deficiency anemia 08/07/2020    Anemia in stage 3 chronic kidney disease 09/04/2020    Stage 3b chronic kidney disease 03/10/2021    Anxiety and depression     RYAN (acute kidney injury) 04/29/2021    Anemia, chronic disease 04/29/2021    Diastolic CHF, chronic 04/29/2021    Frequent UTI     Metabolic acidosis 04/29/2021    Severe malnutrition 04/30/2021    Hydronephrosis 12/16/2021    Generalized weakness 01/28/2022    COVID-19 virus detected 01/28/2022    Hypocalcemia 01/28/2022    Morbid obesity with BMI of 50.0-59.9, adult 01/28/2022    ESRD (end stage renal disease) 01/28/2022    Pancytopenia due to chemotherapy 01/28/2022    Chronic anemia 08/16/2022    Rectal bleeding 08/16/2022    Bicuspid aortic valve 08/18/2022    Complicated UTI (urinary  tract infection) 01/04/2023    Acute UTI (urinary tract infection) 01/04/2023    Severe aortic stenosis 01/04/2023    Combined systolic and diastolic congestive heart failure 01/04/2023    Depression 01/04/2023    ANDREW (obstructive sleep apnea) 01/04/2023    Diastolic CHF, chronic 01/05/2023    Adverse effect of chemotherapy 01/05/2023    UTI (urinary tract infection) 01/30/2023    Hematuria 04/13/2023    Dysuria 07/19/2023    Intractable vomiting 09/28/2023    Hypotension 10/13/2023    Abnormal urinalysis 10/13/2023    Chronic heart failure with preserved ejection fraction (HFpEF) 10/26/2023     Resolved Ambulatory Problems     Diagnosis Date Noted    Nonrheumatic aortic valve stenosis 08/16/2022    Peritoneal dialysis status 01/04/2023    Hypokalemia 01/04/2023    Hypokalemia 10/13/2023     Past Medical History:   Diagnosis Date    Anemia     Breast cancer 2004    CHF (congestive heart failure)     CKD (chronic kidney disease)     COVID 01/2023    Diabetes mellitus     Dialysis patient     Elevated cholesterol     History of cancer chemotherapy 2005    History of hypertension 2023    History of kidney stones     History of MRSA infection 2005    History of radiation therapy     History of sepsis 2010    History of transfusion     Hyperthyroidism     Hypothyroidism     Kidney stone     Lymphedema of leg     Metastasis from breast cancer 2019    Neuromuscular disorder     Obesity     ANDREW on CPAP     Osteoarthrosis     Thickened endometrium     Urinary incontinence     Weakness          PAST SURGICAL HISTORY  Past Surgical History:   Procedure Laterality Date    AORTIC VALVULOPLASTY  01/2023    ARTERIOVENOUS FISTULA/SHUNT SURGERY Left 11/03/2021    Procedure: LEFT  BRACHIOCEPALIC ARTERIOVENOUS FISTULA;  Surgeon: Dejuan Adler MD;  Location: [facility]  Service: Vascular;  Laterality: Left;    BREAST RECONSTRUCTION  2004    WITH IMPLANTS, AND REVISION, NOW REMOVED    BREAST SURGERY Bilateral 2004     breast implants removed and then replaced due to infection    CARDIAC CATHETERIZATION N/A 2022    Procedure: CORONARY ANGIOGRAPHY;  Surgeon: Luis Rivers MD;  Location: [facility] CATH INVASIVE LOCATION;  Service: Cardiology;  Laterality: N/A;    CARDIAC CATHETERIZATION N/A 2022    Procedure: Right Heart Cath;  Surgeon: Luis Rivers MD;  Location: [facility] CATH INVASIVE LOCATION;  Service: Cardiology;  Laterality: N/A;    CARDIAC CATHETERIZATION N/A 01/10/2023    Procedure: Valvuloplasty;  Surgeon: Luis Rivers MD;  Location: [facility] CATH INVASIVE LOCATION;  Service: Cardiovascular;  Laterality: N/A;  01/10/2023    CARDIAC CATHETERIZATION N/A 01/10/2023    Procedure: Aortic root aortogram;  Surgeon: Luis Rivers MD;  Location: [facility] CATH INVASIVE LOCATION;  Service: Cardiovascular;  Laterality: N/A;    CATARACT EXTRACTION WITH INTRAOCULAR LENS IMPLANT Bilateral      SECTION  1987     SECTION  1987    CYSTOSCOPY W/ URETERAL STENT PLACEMENT      CYSTOSCOPY W/ URETERAL STENT PLACEMENT Left 2021    Procedure: CYSTOSCOPY KEFT RETROGRADE PYELOGRAM  LEFT  URETERAL STENT PLACEMENT;  Surgeon: Leodan Mckee Jr., MD;  Location: [facility];  Service: Urology;  Laterality: Left;    CYSTOSCOPY W/ URETERAL STENT PLACEMENT Left 03/10/2022    Procedure: CYSTOSCOPY AND LEFT URETERAL STENT EXCHANGE, RETROGRADE PYELOGRAM;  Surgeon: Leodan Mckee Jr., MD;  Location: [facility];  Service: Urology;  Laterality: Left;    CYSTOSCOPY W/ URETERAL STENT PLACEMENT Left 2023    Procedure: CYSTOSCOPY WITH LEFT URETERAL STENT PLACEMENT, RETROGRADE PYELOGRAM, CLOT EVACUATION, BLADDER FULGARATION;  Surgeon: Leodan Mckee Jr., MD;  Location: [facility] OR;  Service: Urology;  Laterality: Left;    D & C HYSTEROSCOPY N/A 2017    Procedure: DILATATION AND CURETTAGE, HYSTEROSCOPY ;  Surgeon: Cherie Oliveros MD;  Location:   [facility];  Service:     D & C HYSTEROSCOPY N/A 2019    Procedure: DILATATION AND CURETTAGE HYSTEROSCOPY/POLYPECTOMY;  Surgeon: Cherie Oliveros MD;  Location:  [facility];  Service: Gynecology    D & C HYSTEROSCOPY ENDOMETRIAL ABLATION N/A 2023    Procedure: DILATATION AND CURETTAGE;  Surgeon: Ina Marcos MD;  Location:  [facility];  Service: Obstetrics/Gynecology;  Laterality: N/A;    ENDOSCOPY      INSERTION AND REMOVAL HEMODIALYSIS CATHETER Right 2021    INSERTION HEMODIALYSIS CATHETER Right 2021    Procedure: HEMODIALYSIS CATHETER INSERTION;  Surgeon: Clint Hernández MD;  Location:  [facility];  Service: Vascular;  Laterality: Right;    LYMPH NODE BIOPSY      MASTECTOMY Bilateral 2004    VENOUS ACCESS DEVICE (PORT) INSERTION AND REMOVAL           FAMILY HISTORY  Family History   Problem Relation Age of Onset    Heart attack Mother 72    Coronary artery disease Mother         Mother  from MI at 72    Diabetes Mother     Breast cancer Mother     Hyperlipidemia Mother     Arthritis Mother     Cancer Mother     Heart attack Father 74    Aortic aneurysm Father         Father with ruptured thoracic aortic aneurysm    Coronary artery disease Father         Father  from MI at 74    Heart disease Father     Hyperlipidemia Father     Arthritis Father     Heart attack Maternal Grandmother 76    Coronary artery disease Maternal Grandmother         MGM deceeased from MI at age 76    Malig Hyperthermia Neg Hx          SOCIAL HISTORY  Social History     Socioeconomic History    Marital status:      Spouse name: Rk   Tobacco Use    Smoking status: Never     Passive exposure: Never    Smokeless tobacco: Never   Vaping Use    Vaping Use: Never used   Substance and Sexual Activity    Alcohol use: No    Drug use: Never    Sexual activity: Yes     Partners: Male     Birth control/protection: Post-menopausal         ALLERGIES  Patient has no known  allergies.        REVIEW OF SYSTEMS  Review of Systems     All systems reviewed and negative except for those discussed in HPI.       PHYSICAL EXAM  ED Triage Vitals   Temp Heart Rate Resp BP SpO2   11/02/23 0927 11/02/23 0927 11/02/23 0927 11/02/23 0927 11/02/23 0927   97.7 °F (36.5 °C) 110 20 152/96 96 %      Temp src Heart Rate Source Patient Position BP Location FiO2 (%)   11/02/23 0927 11/02/23 0927 11/02/23 0927 11/02/23 1616 --   Tympanic Monitor Lying Right arm        Physical Exam      GENERAL: no acute distress  HENT: nares patent  EYES: no scleral icterus  CV: regular rhythm, normal rate  RESPIRATORY: normal effort, clear to auscultation bilaterally  ABDOMEN: soft, nontender  MUSCULOSKELETAL: no deformity  NEURO: alert, moves all extremities, follows commands  PSYCH:  calm, cooperative  SKIN: warm, dry    Vital signs and nursing notes reviewed.          LAB RESULTS  Recent Results (from the past 24 hour(s))   POC Glucose Once    Collection Time: 11/05/23  9:14 PM    Specimen: Blood   Result Value Ref Range    Glucose 97 70 - 130 mg/dL   POC Glucose Once    Collection Time: 11/06/23 12:56 AM    Specimen: Blood   Result Value Ref Range    Glucose 84 70 - 130 mg/dL   POC Glucose Once    Collection Time: 11/06/23  5:10 AM    Specimen: Blood   Result Value Ref Range    Glucose 52 (L) 70 - 130 mg/dL   POC Glucose Once    Collection Time: 11/06/23  6:34 AM    Specimen: Blood   Result Value Ref Range    Glucose 96 70 - 130 mg/dL   POC Glucose Once    Collection Time: 11/06/23  7:37 AM    Specimen: Blood   Result Value Ref Range    Glucose 84 70 - 130 mg/dL   POC Glucose Once    Collection Time: 11/06/23 12:43 PM    Specimen: Blood   Result Value Ref Range    Glucose 109 70 - 130 mg/dL       Ordered the above labs and reviewed the results.        RADIOLOGY  No Radiology Exams Resulted Within Past 24 Hours    Ordered the above noted radiological studies. Reviewed by me in PACS.           PROCEDURES  Procedures        MEDICATIONS GIVEN IN ER  Medications   sodium chloride 0.9 % flush 10 mL (has no administration in time range)   sennosides-docusate (PERICOLACE) 8.6-50 MG per tablet 2 tablet (2 tablets Oral Not Given 11/6/23 1036)     And   polyethylene glycol (MIRALAX) packet 17 g (has no administration in time range)     And   bisacodyl (DULCOLAX) EC tablet 5 mg (has no administration in time range)     And   bisacodyl (DULCOLAX) suppository 10 mg (has no administration in time range)   ondansetron (ZOFRAN) tablet 4 mg ( Oral Not Given:  See Alt 11/6/23 1510)     Or   ondansetron (ZOFRAN) injection 4 mg (4 mg Intravenous Given 11/6/23 1510)   melatonin tablet 3 mg (3 mg Oral Given 11/5/23 0018)   gabapentin (NEURONTIN) capsule 300 mg (300 mg Oral Given 11/5/23 2122)   pantoprazole (PROTONIX) EC tablet 40 mg (40 mg Oral Not Given 11/6/23 0654)   sertraline (ZOLOFT) tablet 150 mg (150 mg Oral Not Given 11/6/23 1036)   Menthol-Zinc Oxide 1 application  (1 application  Topical Not Given 11/6/23 1857)   miconazole (MICOTIN) 2 % powder ( Topical Not Given 11/6/23 1035)   scopolamine patch 1 mg/72 hr (1 patch Transdermal Medication Applied 11/4/23 1648)   LORazepam (ATIVAN) injection 0.5 mg (0.5 mg Intravenous Given 11/6/23 1432)     Or   LORazepam (ATIVAN) injection 0.5 mg ( Subcutaneous Not Given:  See Alt 11/6/23 1432)     Or   LORazepam (ATIVAN) 2 MG/ML concentrated solution 0.5 mg ( Sublingual Not Given:  See Alt 11/6/23 1432)   LORazepam (ATIVAN) injection 1 mg (has no administration in time range)     Or   LORazepam (ATIVAN) injection 1 mg (has no administration in time range)     Or   LORazepam (ATIVAN) 2 MG/ML concentrated solution 1 mg (has no administration in time range)   LORazepam (ATIVAN) injection 2 mg (has no administration in time range)     Or   LORazepam (ATIVAN) injection 2 mg (has no administration in time range)     Or   LORazepam (ATIVAN) 2 MG/ML concentrated solution 2 mg  (has no administration in time range)   diphenoxylate-atropine (LOMOTIL) 2.5-0.025 MG per tablet 1 tablet (has no administration in time range)   Glycerin-Hypromellose- (ARTIFICIAL TEARS) 0.2-0.2-1 % ophthalmic solution solution 1 drop (has no administration in time range)   acetaminophen (TYLENOL) tablet 650 mg (has no administration in time range)     Or   acetaminophen (TYLENOL) 160 MG/5ML oral solution 650 mg (has no administration in time range)     Or   acetaminophen (TYLENOL) suppository 650 mg (has no administration in time range)   First Mouthwash (Magic Mouthwash) 5 mL (has no administration in time range)   HYDROmorphone (DILAUDID) injection 0.5 mg ( Intravenous Not Given:  See Alt 11/6/23 1135)     Or   morphine injection 2 mg (2 mg Intravenous Given 11/6/23 1135)     Or   morphine concentrated solution 5 mg ( Sublingual Not Given:  See Alt 11/6/23 1135)     Or   ketorolac (TORADOL) injection 15 mg ( Intravenous Not Given:  See Alt 11/6/23 1135)   HYDROmorphone (DILAUDID) injection 1 mg ( Intravenous Not Given:  See Alt 11/6/23 1432)     Or   morphine injection 4 mg (4 mg Intravenous Given 11/6/23 1432)     Or   morphine concentrated solution 10 mg ( Sublingual Not Given:  See Alt 11/6/23 1432)   HYDROmorphone (DILAUDID) injection 1.5 mg (has no administration in time range)     Or   Morphine injection 6 mg (has no administration in time range)     Or   morphine concentrated solution 20 mg (has no administration in time range)   diphenhydrAMINE (BENADRYL) capsule 25 mg (has no administration in time range)     Or   diphenhydrAMINE (BENADRYL) injection 25 mg (has no administration in time range)   prochlorperazine (COMPAZINE) injection 5 mg (has no administration in time range)     Or   prochlorperazine (COMPAZINE) tablet 5 mg (has no administration in time range)     Or   prochlorperazine (COMPAZINE) suppository 25 mg (has no administration in time range)   promethazine (PHENERGAN) tablet 12.5 mg  (has no administration in time range)     Or   promethazine (PHENERGAN) suppository 12.5 mg (has no administration in time range)   glycopyrrolate (ROBINUL) injection 0.2 mg (has no administration in time range)     Or   glycopyrrolate (ROBINUL) injection 0.2 mg (has no administration in time range)     Or   glycopyrrolate (ROBINUL) injection 0.4 mg (has no administration in time range)     Or   glycopyrrolate (ROBINUL) injection 0.4 mg (has no administration in time range)   haloperidol (HALDOL) tablet 1 mg (has no administration in time range)     Or   haloperidol (HALDOL) 2 MG/ML solution 1 mg (has no administration in time range)     Or   haloperidol lactate (HALDOL) injection 1 mg (has no administration in time range)   haloperidol (HALDOL) tablet 2 mg (has no administration in time range)     Or   haloperidol (HALDOL) 2 MG/ML solution 2 mg (has no administration in time range)     Or   haloperidol lactate (HALDOL) injection 2 mg (has no administration in time range)   sodium chloride nasal spray 2 spray (has no administration in time range)   HYDROmorphone (DILAUDID) injection 0.5 mg (0.5 mg Intravenous Given 11/2/23 1142)   ondansetron (ZOFRAN) injection 4 mg (4 mg Intravenous Given 11/2/23 1142)   sodium chloride 0.9 % bolus 500 mL (0 mL Intravenous Stopped 11/2/23 1439)   sodium chloride 0.9 % bolus 500 mL (0 mL Intravenous Stopped 11/2/23 1541)   midodrine (PROAMATINE) tablet 10 mg (10 mg Oral Given 11/3/23 0138)   sodium chloride 0.9 % bolus 500 mL (500 mL Intravenous New Bag 11/3/23 1037)   sodium zirconium cyclosilicate (LOKELMA) pack 10 g (10 g Oral Given 11/3/23 1322)   diphenhydrAMINE (BENADRYL) capsule 25 mg (25 mg Oral Given 11/4/23 0020)   sodium zirconium cyclosilicate (LOKELMA) pack 10 g (10 g Oral Given 11/4/23 1906)         MEDICAL DECISION MAKING, PROGRESS, and CONSULTS    Discussion below represents my analysis of pertinent findings related to patient's condition, differential diagnosis,  treatment plan and final disposition.        Additional sources:  - Discussed/obtained information from independent historians: EMS report  Additional information was obtained to confirm the patient's history.          Differential diagnosis:    ACS, underlying infectious process, volume depletion            Independent interpretation of labs, radiology studies, and discussions with consultants:  ED Course as of 11/06/23 2000   Thu Nov 02, 2023   1034 EKG independently interpreted by myself.  Time 10:26 AM.  Atrial fibrillation.  Heart rate 104.  Low voltage in the precordial leads.  Prolonged R wave progression.  Nonspecific T wave changes. [TD]   1214 Hemoglobin(!): 8.3 [TD]   1214 WBC: 6.66 [TD]   1316 TSH Baseline(!): 6.080 [TD]   1316 Free T4(!): 0.78 [TD]   1435 BP(!): 81/33 [TD]   1516 Patient's blood pressure is 81/46 most recently.  She does not appear to be septic.  She has a known history of lower blood pressure for which he takes midodrine.  However, given the persistently low blood pressure, I do think she needs to be admitted [TD]   1533 I discussed the case with Dr. Le, hospitalist.  She will admit. [TD]      ED Course User Index  [TD] Vitor Vazquez II, MD       Patient is mentating well.  I do not think that she is infected.  Therefore have not ordered antibiotics for her.  However, I think we need to monitor given her lower blood pressures.  That said, she is on midodrine normally and just may need adjustments to her dose edging as she has a history of similar.    DIAGNOSIS  Final diagnoses:   Hypotension, unspecified hypotension type   ESRD (end stage renal disease)         DISPOSITION  Admit        --    Please note that portions of this were completed with a voice recognition program.       Note Disclaimer: At [facility], we believe that sharing information builds trust and better relationships. You are receiving this note because you are receiving care at [facility] or  recently visited. It is possible you will see health information before a provider has talked with you about it. This kind of information can be easy to misunderstand. To help you fully understand what it means for your health, we urge you to discuss this note with your provider.         Vitor Vazquez II, MD  11/06/23 2003

## 2023-11-08 NOTE — DISCHARGE SUMMARY
PHYSICIAN DISCHARGE SUMMARY                                                                        [facility]    Patient Identification:  Name: Juana Hall  Age: 63 y.o.  Sex: female  :  1960  MRN: [id number]  Primary Care Physician: Kiana Noriega ([place]), BETY    Admit date: 2023  Discharge date and time: 2023 11:31 PM   Discharged Condition:      Discharge Diagnoses:  Hypotension    Diabetes type 2, controlled    Acquired hypothyroidism    Primary malignant neoplasm of breast with metastasis    Anxiety and depression    Anemia, chronic disease    Morbid obesity with BMI of 50.0-59.9, adult    ESRD (end stage renal disease)    Severe aortic stenosis    Combined systolic and diastolic congestive heart failure         Hospital Course:  Pleasant 63-year-old female with multiple medical issues including end-stage renal disease, hemodialysis dependent, CHF with severe arctic stenosis, metastatic breast cancer was noted to be severely hypotensive on arrival for hemodialysis.  She was sent to emergency room admitted to ICU initially put on pressors.  Work-up revealed ongoing progression of her metastatic breast cancer with carcinomatosis being present.  She is obviously resistant to treatment and there is nothing further to offer in this respect.  Despite resuscitation there is ongoing difficulty maintaining adequate blood pressure and he requests was for to switch to goals of care to comfort measures.  She was transferred to the palliative care unit at this point where she did pass away the following evening.      Consults:     Consults       Date and Time Order Name Status Description    11/3/2023  1:40 PM Inpatient Neurology Consult General Completed     11/3/2023 11:50 AM Hematology & Oncology Inpatient Consult Completed     11/3/2023 11:50 AM Inpatient Pulmonology Consult Completed     2023  5:46 PM  Inpatient Nephrology Consult Completed     10/18/2023 11:31 AM Inpatient General Surgery Consult Completed     10/15/2023 12:54 PM Hematology & Oncology Inpatient Consult Completed     10/14/2023 12:34 AM Inpatient Cardiology Consult Completed     10/13/2023  9:56 PM Inpatient Pulmonology Consult Completed               Discharge Exam:  Not applicable     Disposition:   home    Patient Instructions:         No future appointments.      Discharge Order (From admission, onward)       Start     Ordered    23  Discharge patient  Once        Expected Discharge Date: 23   Discharge Disposition:    Physician of Record for Attribution - Please select from Treatment Team: KOSTA BEDOLLA [[id number]]   Review needed by CMO to determine Physician of Record: No      Question Answer Comment   Physician of Record for Attribution - Please select from Treatment Team KOSTA BEDOLLA    Review needed by CMO to determine Physician of Record No        23                      Total time spent discharging patient including evaluation,post hospitalization follow up,  medication and post hospitalization instructions and education total time exceeds 30 minutes.    Signed:  Kosta Bedolla MD  2023  19:23 EST    EMR Dragon/Transcription disclaimer:   Much of this encounter note is an electronic transcription/translation of spoken language to printed text. The electronic translation of spoken language may permit erroneous, or at times, nonsensical words or phrases to be inadvertently transcribed; Although I have reviewed the note for such errors, some may still exist.

## 2023-11-27 DIAGNOSIS — F32.A ANXIETY AND DEPRESSION: ICD-10-CM

## 2023-11-27 DIAGNOSIS — F41.9 ANXIETY AND DEPRESSION: ICD-10-CM

## 2023-11-27 RX ORDER — SERTRALINE HYDROCHLORIDE 100 MG/1
TABLET, FILM COATED ORAL
Qty: 135 TABLET | Refills: 0 | OUTPATIENT
Start: 2023-11-27

## 2023-12-04 RX ORDER — FOLIC ACID 1 MG/1
TABLET ORAL
Qty: 90 TABLET | OUTPATIENT
Start: 2023-12-04

## 2024-08-15 NOTE — PROGRESS NOTES
Physician Weekly Management Note    Diagnosis:     Diagnosis Plan   1. Primary malignant neoplasm of breast with metastasis            RT Details:  fx 5/10 lumbar spine    Notes on Treatment course, Films, Medical progress:  Kps 80% doingwell, pain controlled, cont on     Weekly Management:  Medication reviewed?   Yes  New medications given?   No  Problemlist reviewed?   Yes  Problem added?   No  Issues raised requiring referral to support services - task assigned to:  chioma    Technical aspects reviewed:  Weekly OBI approved?   Yes  Weekly port films approved?   Yes  Change requests noted on port film?   No  Patient setup and plan reviewed?   Yes    Chart Reviewed:  Continue current treatment plan?   Yes  Treatment plan change requested?   No  CBC reviewed?   Yes  Concurrent Chemo?   No    Objective     Toxicities:   none      Review of Systems   Constitutional: Negative.         Vitals:    05/31/22 1627   BP: 141/82   Pulse: 77   Resp: 20   SpO2: 96%           9/26/2023    10:18 AM   Current Status   ECOG score 3       Physical Exam  Constitutional:       Appearance: Normal appearance.   Neurological:      Mental Status: She is alert.           Problem Summary List    Diagnosis:     Diagnosis Plan   1. Primary malignant neoplasm of breast with metastasis          Pathology:   breast     Past Medical History:   Diagnosis Date   • Anemia    • Anxiety and depression    • Bicuspid aortic valve     had surgery   • Breast cancer 2004    RIGHT, HAD NEELA. 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   • History of sepsis 2010    KIDNEY STONE   • History of transfusion     no reaction   • Hyperlipidemia    • Hyperthyroidism    • Hypothyroidism    • Kidney stone     CURRENT LEFT-DEC 2021   • Lymphedema of leg     LEFT   • Metastasis from breast cancer 2019    STOMACH-OMENTUM   • Neuromuscular disorder    • Obesity    • ANDREW on CPAP     CPAP   • Osteoarthrosis    • Peripheral neuropathy     FEET BILAT   • Radiculopathy    • Rectal bleeding 08/16/2022   • Thickened endometrium    • Urinary incontinence     wears pads   • Weakness     BILAT LEGS-WITH ANY AMT OF TIME         Past Surgical History:   Procedure Laterality Date   • AORTIC VALVULOPLASTY  01/2023   • ARTERIOVENOUS FISTULA/SHUNT SURGERY Left 11/03/2021    Procedure: LEFT  BRACHIOCEPALIC ARTERIOVENOUS FISTULA;  Surgeon: Dejuan Adler MD;  Location: [facility] OR;  Service: Vascular;  Laterality: Left;   • BREAST RECONSTRUCTION  2004    WITH IMPLANTS, AND REVISION, NOW REMOVED   • BREAST SURGERY Bilateral 2004    breast implants removed and then replaced due to infection   • CARDIAC CATHETERIZATION N/A 08/23/2022    Procedure: CORONARY ANGIOGRAPHY;  Surgeon: Luis Rivers MD;  Location: [facility] CATH INVASIVE LOCATION;  Service: Cardiology;  Laterality: N/A;   • CARDIAC CATHETERIZATION N/A 08/23/2022    Procedure: Right Heart Cath;  Surgeon: Luis Rivers MD;  Location: [facility] CATH INVASIVE LOCATION;  Service: Cardiology;  Laterality: N/A;   • CARDIAC CATHETERIZATION N/A 01/10/2023    Procedure: Valvuloplasty;  Surgeon: Luis Rivers MD;  Location: [facility] CATH INVASIVE LOCATION;  Service: Cardiovascular;  Laterality: N/A;  01/10/2023   • CARDIAC CATHETERIZATION N/A 01/10/2023    Procedure: Aortic root aortogram;  Surgeon: Luis Rivers MD;  Location: [facility] CATH INVASIVE LOCATION;  Service:  Cardiovascular;  Laterality: N/A;   • CATARACT EXTRACTION WITH INTRAOCULAR LENS IMPLANT Bilateral    •  SECTION  1987   •  SECTION  1987   • CYSTOSCOPY W/ URETERAL STENT PLACEMENT     • CYSTOSCOPY W/ URETERAL STENT PLACEMENT Left 2021    Procedure: CYSTOSCOPY KEFT RETROGRADE PYELOGRAM  LEFT  URETERAL STENT PLACEMENT;  Surgeon: Leodan Mckee Jr., MD;  Location: [facility] MAIN OR;  Service: Urology;  Laterality: Left;   • CYSTOSCOPY W/ URETERAL STENT PLACEMENT Left 03/10/2022    Procedure: CYSTOSCOPY AND LEFT URETERAL STENT EXCHANGE, RETROGRADE PYELOGRAM;  Surgeon: Leodan Mckee Jr., MD;  Location: [facility] MAIN OR;  Service: Urology;  Laterality: Left;   • CYSTOSCOPY W/ URETERAL STENT PLACEMENT Left 2023    Procedure: CYSTOSCOPY WITH LEFT URETERAL STENT PLACEMENT, RETROGRADE PYELOGRAM, CLOT EVACUATION, BLADDER FULGARATION;  Surgeon: Leodan Mckee Jr., MD;  Location: [facility] MAIN OR;  Service: Urology;  Laterality: Left;   • D & C HYSTEROSCOPY N/A 2017    Procedure: DILATATION AND CURETTAGE, HYSTEROSCOPY ;  Surgeon: Cherie Oliveros MD;  [facility];  Service:    • D & C HYSTEROSCOPY N/A 2019    Procedure: DILATATION AND CURETTAGE HYSTEROSCOPY/POLYPECTOMY;  Surgeon: Cherie Oliveros MD;  [facility];  Service: Gynecology   • D & C HYSTEROSCOPY ENDOMETRIAL ABLATION N/A 2023    Procedure: DILATATION AND CURETTAGE;  Surgeon: Ina Marcos MD;  Location: [facility] MAIN OR;  Service: Obstetrics/Gynecology;  Laterality: N/A;   • ENDOSCOPY     • INSERTION AND REMOVAL HEMODIALYSIS CATHETER Right 2021   • INSERTION HEMODIALYSIS CATHETER Right 2021    Procedure: HEMODIALYSIS CATHETER INSERTION;  Surgeon: Clint Hernández MD;  Location: [facility] MAIN OR;  Service: Vascular;  Laterality: Right;   • LYMPH NODE BIOPSY     • MASTECTOMY Bilateral    • VENOUS ACCESS DEVICE (PORT) INSERTION AND REMOVAL            Current Outpatient Medications on File Prior to Visit   Medication Sig Dispense Refill   • acetaminophen (TYLENOL) 500 MG tablet Take 2 tablets by mouth As Needed for Mild Pain.     • vitamin B-12 (CYANOCOBALAMIN) 1000 MCG tablet Take 1 tablet by mouth Daily.       No current facility-administered medications on file prior to visit.       No Known Allergies      Referring Provider:    Leodan Irvin Jr., Md  [address] [place] [address]    Oncologist:  No primary care provider on file.      Seen and approved by:  Lou Sotomayor MD  05/31/2022  Subjective

## 2025-04-28 NOTE — PROGRESS NOTES
"Chief Complaint  Metastatic lobular breast cancer (ER/OR positive, HER-2/tj negative), anemia secondary to CKD3, CHF, peripheral neuropathy, chemotherapy related nausea chemotherapy-induced myelosuppression    Subjective        History of Present Illness  The patient returns today in follow-up continuing on monthly Faslodex due for cycle 14 today and continuing on abemaciclib 150 mg twice daily.  As she is reviewed back today her hemoglobin is down to 7.1.  Though traditionally we do not transfuse until less than 7 patient is feeling quite poorly today with extreme fatigue and requesting to go ahead with transfusion.    She also continues with intermittent nausea requiring Zofran a few times a week.  She is requesting a refill.    She denies other concerns at this time.    Objective   Vital Signs:   BP (!) 70/39   Pulse 56   Temp 96.9 °F (36.1 °C) (Temporal)   Resp 18   Ht 170.2 cm (67.01\")   Wt (!) 165 kg (363 lb)   SpO2 95%   BMI 56.84 kg/m²     Physical Exam  Constitutional:       Appearance: She is well-developed. She is obese.      Comments: Patient is in a motorized scooter today   Eyes:      Conjunctiva/sclera: Conjunctivae normal.   Cardiovascular:      Rate and Rhythm: Normal rate and regular rhythm.      Heart sounds: Murmur heard.     No friction rub. No gallop.      Comments: 2/6 murmur  Pulmonary:      Effort: No respiratory distress.      Breath sounds: Normal breath sounds.   Abdominal:      General: Bowel sounds are normal. There is no distension.      Palpations: Abdomen is soft.      Tenderness: There is no abdominal tenderness.   Musculoskeletal:         General: Swelling present.      Comments: 1+ bilateral lower extremity edema with venous stasis changes noted.  Left upper extremity fistula   Lymphadenopathy:      Head:      Right side of head: No submandibular adenopathy.      Cervical: No cervical adenopathy.      Upper Body:      Right upper body: No supraclavicular adenopathy.      " Patient called and stated she is almost at her goal weight of 130 pounds.  She currently weighs 132 pounds and has also started doing Pilates.  She just picked up her refill of Zepbound for May.  She would like to know how she can wean off of the  medication.  Please advise.  Thank you!   Left upper body: No supraclavicular adenopathy.   Skin:     General: Skin is warm and dry.      Findings: No bruising or rash.   Neurological:      Mental Status: She is alert and oriented to person, place, and time.      Cranial Nerves: No cranial nerve deficit.   Psychiatric:         Behavior: Behavior normal.        I have reexamined the patient and the results are consistent with the previously documented exam. Ansley Flores, APRN       Result Review :   Results from last 7 days   Lab Units 12/08/22  0931   WBC 10*3/mm3 2.97*   NEUTROS ABS 10*3/mm3 1.88   HEMOGLOBIN g/dL 7.1*   HEMATOCRIT % 22.4*   PLATELETS 10*3/mm3 146                    Assessment and Plan    *Metastatic lobular breast cancer (ER/SD positive, HER-2/tj negative):  · Previous stage IIIC right breast cancer in 2003, received neoadjuvant chemotherapy (unknown regimen) with subsequent bilateral mastectomies 1/22/2004 with right axillary dissection.  Residual 10-12 cm invasive lobular carcinoma on the right breast with 14/16+ nodes with extranodal extension.  Received adjuvant carboplatin and Navelbine chemotherapy x4 cycles  · Attempted adjuvant radiation to the chest wall however interrupted due to cellulitis that required removal of implants in August 2004  · Received tamoxifen from 6/22/2004 until June 2009.  Transitioned to Femara and received until June 2014  · Identification of CT findings concerning for carcinomatosis on CT scans and June 2019.  · PET scan confirmed hypermetabolic activity in the omentum as well as small retroperitoneal lymph nodes.  · CT-guided omental biopsy 7/30/2019 with metastatic lobular carcinoma of breast primary, ER positive (greater than 95%), SD positive (90%), HER-2/tj negative (1+ IHC)  · [facility] liquid biopsy 2/5/2020: MSI undetermined, mutations in BETH, NF1, CHEK2.  No evidence of PIK3CA mutation.  · Subsequent [facility] germline testing 11/12/2021 with BETH VUS (c.2864C>A) and POLE VUS  (c.631A>T)  · Initiation of Aromasin and Ibrance in August 2019  · Difficulty with myelosuppression related to Ibrance requiring dose alterations, eventually changed to 100 mg for 7 days on followed by 7 days off.  · CT chest abdomen pelvis 10/26/2021 with increase in left hydronephrosis with increase in left perinephric and periureteral stranding. Decrease in stone in the left kidney lower pole (7 mm).  Stable small retroperitoneal lymph and left periaortic lymph nodes.  · PET scan 11/10/2021 with multiple bilateral hypermetabolic nodular pulmonary lesions, asymmetric moderate to severe left hydronephrosis with ill-defined fat stranding and soft tissue thickening in the renal sinus and surrounding the left ureter, hypermetabolic left retroperitoneal lymph node with slight increase in size, ill-defined hypermetabolic thickening in the inferior omentum with increase in size, uptake and C7 (indeterminate, recommended MRI).  Short segments of uptake in the mid and distal esophagus possibly related to gastritis.  · PET scan findings felt to be consistent with progressive malignancy.  Patient declined repeat omental biopsy.   · Options for further treatment discussed on 11/12/2021.  In light of renal failure and dialysis, options are more limited.  Recommendation to continue Ibrance and discontinue Aromasin, begin Faslodex.  Discussed possible future use of single agent Taxol.    · Aromasin discontinued 11/12/2021  · On 11/22/2021 initiation of Faslodex with continuation of Ibrance (100 mg daily for 7 days on followed by 7 days off).  · MRI cervical spine 11/18/2021 showed the right C7 normality to likely represent metastatic disease.  With solitary bone lesion, did not recommend pursuit of bone modifying agent.  · Following 2 cycles Faslodex/Ibrance, PET scan on 1/11/2022 showed no change in the soft tissue superior to the dome of the bladder with hypermetabolic activity (likely related to carcinomatosis).  Significant  decrease in injection of fat around the left renal pelvis and stable retroperitoneal hypermetabolic lymph nodes, decrease in size and activity in small bilateral pulmonary nodules.  Findings felt to represent evidence of response to treatment.    · Ibrance held briefly beginning 1/28/2022 due to hospitalization with COVID-19 infection and C. difficile colitis.  Patient developed leukopenia/neutropenia.  Ibrance resumed at previous dosing (100 mg daily 7 days on followed by 7 days off) on 2/9/22.  · Following 5 cycles Faslodex/Ibrance PET scan 4/19/2022 with evidence of mixed response.  New hypermetabolic lesion spinous process L2 (SUV 5.1) and slight increase in activity left clavicular metastasis (SUV increased 4.6-8.1).  C7 hypermetabolic mixed lytic and blastic bone lesion was unchanged.  Decrease in uptake involving omental thickening.  Stable soft tissue thickening around the left renal pelvis and ureter.  Discussion again regarding potential pursuit of IV chemotherapy with Taxol versus continuation of Ibrance and Faslodex with radiation to sites of bony disease at L2, left clavicle, C7.  Patient opted to defer initiation of systemic chemotherapy and continue Ibrance/Faslodex with consideration of radiation.  · Initiated monthly Xgeva on 5/19/2022 (cleared with nephrology).  Xgeva subsequently held due to significant hypocalcemia.  Decision made to forego further Xgeva with persistent hypocalcemia.  · Palliative radiation received to lumbar spine regarding L2 metastasis 5/23- 6/7/2022  · Ibrance held during radiation therapy beginning 5/22/2022.  Ibrance resumed 6/16/2022.  · PET scan 7/21/2022 with stable hypermetabolic abdominal pelvic lymph nodes and subcarinal lymph node, stable bone lesions at C7, left clavicle.  Stable soft tissue thickening around the left renal pelvis with left hydronephrosis.  Uptake in adrenal glands felt to be likely reactive (no change in size).  No activity noted at L2 (previously  radiated).  New hypermetabolic lesion right anterior sixth rib.  · With evidence of further slight progression in bone on PET scan (new right sixth rib lesion), on 7/28/2022 discontinued Ibrance and plan to transition to abemaciclib with continuation of Faslodex.    · On 8/4/2022 initiated abemaciclib at reduced dose of 50 mg twice daily.  · On 8/25/2022, increased abemaciclib dose to 100 mg twice daily  · PET scan 10/6/2022 with stable findings with exception of left acetabular activity report noted new activity however on prior PET scan question of focal activity present previously.  No corresponding CT abnormality and significant increase in SUV at sacral lesion.  Scan otherwise stable.  Decision to continue current treatment  · Abemaciclib dose increased on 10/13/2022 up to 150 mg twice daily  · Patient returns today in follow-up due for cycle 14 Faslodex 500 mg IM every 4 weeks and continuing on abemaciclib 150 mg twice daily that was initiated 8/22 at 50 mg daily with dose increase on 8/25/2022 to 100 mg twice daily, and further increase to 150 mg twice daily on 10/13/2022.  Patient is tolerating the dose increase well with no overt changes in her stool patterns.  She is currently having significant more fatigue with dropping hemoglobin as further discussed below.  We will proceed with transfusion.  Otherwise she will undergo repeat PET scan in 3 weeks and then follow-up with Dr. Irvin in 4 weeks for scan review and cycle 15 Faslodex pending scan results    *Anemia secondary to ESRD, underlying malignancy/chronic disease, myelosuppression from CDK 4/6 inhibitor, GI blood loss:  · Anemia secondary to ESRD, malignancy with exacerbation by use of Ibrance  · Previous evidence of folate deficiency 8/12/2019 with level 3.84, initiated folic acid 1 mg daily  · Previous evidence of iron deficiency, received Injectafer on 8/7/2020 750 mg IV x1 dose.  Second dose not administered due to onset of fever, subsequent iron  studies precluded further administration of IV iron.  · Previous low normal B12 level initiated oral B12 1000 mcg daily.  · Patient had previously received Procrit and required intermittent transfusion support  · Following initiation of hemodialysis, management of BEAU transitioned to nephrology, receiving Epogen with dialysis.  · Ongoing transfusion support prompted alteration in Ibrance dosing on 8/23/2021.  · After alteration in Ibrance dosing, hemoglobin improved and remained stable in the 9-10 range.  · Improved but ongoing intermittent need for transfusion support despite Ibrance dose alteration  · Stool negative for occult blood x3 on 11/2/2021  · Patient with reduced dialysis frequency to 2 days/week with concurrent decrease in Epogen dosing corresponding again to increased transfusion requirement.  · Epogen dose increased per nephrology to 20,000 units twice weekly with dialysis on Mondays and Fridays  · Ibrance again exacerbating anemia with ongoing intermittent transfusion requirements.  Ibrance subsequently discontinued on 7/28/2022 and patient initiated abemaciclib on 8/4/2022 which may be less myelosuppressive.  · Additional transfusions required with hemoglobin on 3/31/2022 6.6, hemoglobin 4/21/2022 of 6.4, hemoglobin on 5/5/2022 of 6.8, hemoglobin 6.4 on 7/14/2022, hemoglobin 6.4 on 7/28/2022, hemoglobin 6.8 on 8/18/2022.  · Patient did develop transient visible blood in stool in July 2022  · Anemia evaluation 7/28/2022 with iron 32, ferritin 412, iron saturation 15%, TIBC 210, folate 19.9, B12 925, haptoglobin 300, .  · Stool positive for occult blood x3 on 8/1/2022  · Patient was seen by GI on 8/16/2022, felt to be high risk for endoscopic evaluation and elected to forego EGD/colonoscopy for now  · Patient transitioned from hemodialysis in clinic to home hemodialysis 5 days/week x 2 hours beginning 8/29/2022.  With transition to home dialysis, nephrology transition the patient from Epogen to  Mircera administered every 4 weeks in their office, initiated 8/22/2022.  · Patient currently continues Mircera every 4 weeks.  She did need a blood transfusion after hemoglobin on 10/27/2022 was 6.6.  · Hemoglobin today 7.1.  Patient is symptomatic with extreme fatigue today, requesting to go ahead with transfusion.    *ESRD on HD:  · Patient with previous CKD3/4  · Hospitalization 4/29-5/4/2021 with RYAN/CKD, UTI, anemia.  Required initiation of hemodialysis Tuesday Thursday Saturday.   · Decrease in frequency of dialysis to twice per week.  She continues to produce a significant amount of urine.   · Status post left upper extremity AV fistula placement on 11/3/2021 by vascular surgery  · Attempt to hold further dialysis on 1/11/2022 with close monitoring of her laboratory and clinical parameters.  Dialysis quickly resumed due to development of hyperkalemia.  · Patient was undergoing dialysis 2 days/week on Mondays and Fridays.    · On 8/22/2022 patient transitioned to use of home dialysis device 5 days/week x 2 hours.    *CHF with severe aortic stenosis:  · Echocardiogram 7/12/2019 showing ejection fraction 48% with moderate dilation of the LV cavity, global hypokinesis, grade 2 diastolic dysfunction, mild to moderate aortic stenosis, trivial pericardial effusion.  · Echocardiogram 7/19/2021 with ejection fraction 56%, left atrial volume moderately increased, grade 2 diastolic dysfunction, severe calcification aortic valve, moderate aortic stenosis, severe mitral calcification, mild mitral stenosis, marked elevation in RVSP from tricuspid regurgitation.  · Echocardiogram on 7/13/2022 with ejection fraction 56-60%, grade 2 diastolic dysfunction, severe aortic stenosis.    · Cardiac catheterization 8/23/2022 showed normal coronary arteries, mild to moderate pulmonary hypertension.    · She was evaluated by cardiothoracic surgery Dr. Pagan and there was discussion regarding potential candidacy for TAVR although there  was concern given her underlying comorbidities including her metastatic breast cancer.  I discussed patient's prognosis with Dr. Pagan.  She does have opportunities for additional treatment of her malignancy with intravenous chemotherapy and is willing to pursue this in the future when needed.  It is unclear as to her expected survival however given her multiple severe comorbidities with metastatic breast cancer, ESRD on dialysis, severe aortic stenosis, severe anemia.  There is consideration of possible pursuit of balloon valvuloplasty initially to assess her symptomatic response and then consider pursuit of TAVR in the future.   · Patient is awaiting further decision from cardiology as to recommendations for further management.  She was seen on 10/25/2022 but reports she was told that her life expectancy is not long enough for procedure.  She will follow-up in January 2023 and discussed once again.    *Left upper and bilateral lower extremity peripheral neuropathy:  · Patient reports that left upper extremity neuropathy was not present from previous chemotherapy but occurred after hospitalization that required intubation and critical care stay.  Apparently felt to represent critical care neuropathy.  Improved over time.  · Bilateral lower extremity neuropathy felt to be related to diabetes  · May have future implications regarding treatment for breast cancer.  · Patient reports that peripheral neuropathy symptoms continue in the bilateral lower extremities, unchanged.  This will be a consideration with potential future use of Taxol    *Uterine/endometrial activity on PET scan:  · Patient experienced postmenopausal vaginal bleeding in 2013, negative endometrial biopsy and gynecologic evaluation.  · With findings on PET scan with enlarging uterus and some hypermetabolic activity, referred back to Dr. Oliveros in gynecology.    · 9/19/2019 D&C with hysteroscopy by Dr. Oliveros, no significant intraoperative findings,  pathologic results with benign findings, no evidence of malignancy.    *Nausea:  · Mild, relieved with Zofran.   · Patient experienced improvement in nausea after initiating hemodialysis  · No recent nausea    *Myelosuppression secondary to CDK 4/6 inhibitor:  · Patient was able to maintain adequate WBC after dosing alteration on Ibrance to treatment at 100 mg daily for 7 days on followed by 7 days off.  · Subsequent transition from Ibrance to abemaciclib  · Today, WBC 2.97.  Stable    *Depression  · Patient with history of depression, worsened after the death of her son in November 2021  · With evidence of progressive malignancy in November 2021, patient agreeable to referral to supportive oncology  · Patient currently receiving gabapentin 300 mg nightly, Zoloft 150 mg daily, armodafinil 250 mg daily  · Patient does continue with difficulties regarding depression that wax and wane.  Currently symptoms under fair control.  Patient last seen by supportive oncology on 9/26/2022.    *Left perinephric stranding secondary to malignancy with hydronephrosis:  · CT 4/22/2021 showed interval enlargement of 2 staghorn calculi in the left kidney with persistent left perinephric stranding  · Hospitalization 4/29-5/4/2021 with RYAN/CKD, UTI, anemia.  Required 2 unit PRBC transfusion and initiated hemodialysis Tuesday Thursday Saturday.    · Discussion from urology regarding potential need for surgical procedure to address staghorn calculus left kidney  · CT scan 7/2/2021 with only 1 remaining left renal stone identified which had decreased in size.  Patient appears to have passed the other stone.  · As above, CT 10/26/2021 with more prominent left hydronephrosis and increase in left perinephric and periureteral stranding.  Felt to possibly be related to passage of recent stone versus partial obstruction secondary to debris or thrombus in the left ureter versus pyelonephritis.  Patient however with no clinical evidence of recent  stone passage nor pyelonephritis. Malignancy felt to be the cause of CT changes around the left kidney at this point.   · Left ureteral stent replacement 12/16/2021  · PET scan 1/11/2022 with decrease in fat injection near left renal pelvis, stent in satisfactory position.  Mild residual hydronephrosis on the left.  · Ureteral stent replaced on 3/10/2022  · PET scan 4/19/2022 with no hydronephrosis, left ureteral stent in place  · PET scan 7/21/2022 with persistent left hydronephrosis, ureteral stent in place  · PET scan 10/6/2022 with left nephroureteral stent in place, overall stable findings    *Right thyroid nodules  · Patient underwent prior thyroid biopsy by her report with benign findings many years ago, records not available  · On MRI cervical spine 11/18/2021, finding of 1.8 cm right thyroid nodule  · Thyroid ultrasound 11/26/2021 with right-sided thyroid nodules, 1 nodule was hypoechoic, solid measuring 2 cm with biopsy recommended.  · Thyroid ultrasound results reviewed with patient.  In light of her metastatic breast cancer, renal failure the significance of the thyroid nodule is felt to be much less.  We discussed possibility of thyroid biopsy however patient is inclined to forego this, especially since she has had a biopsy performed in the past with benign findings by her report.    · No hypermetabolic activity identified on PET scan 1/11/2022 in the neck    *Hospitalization 1/28-1/30/2022 due to COVID-19 infection and C. difficile colitis  · Patient fully vaccinated with 3 doses Moderna COVID-19 vaccine  · Patient developed symptoms 1/26/2022 with abrupt onset sinus congestion, mild cough, subsequently developed nausea and diarrhea on 1/27/2022.  Significant fatigue.  · Patient tested positive in emergency department on 1/28/2022 and was admitted  · Patient maintained O2 saturation in mid 90% range on room air  · Patient received remdesivir  · Patient was also found to be positive for C. difficile  colitis, received course of oral vancomycin.  · Symptoms from both COVID-19 and C. difficile colitis ultimately resolved.    *Hypocalcemia  · Patient developed significant hypocalcemia after receiving Xgeva on 5/19/2022  · Calcium management per nephrology  · No further Xgeva planned due to persistent significant hypocalcemia  · Calcium today 9.4.    Plan:  1. Patient will proceed with Faslodex 500 mg IM injection today and continue every 28 days (today is cycle 14)  2. Continue abemaciclib dose 150 mg twice daily (patient will not receive the 150 mg dose until next week)  3. Continue oral folic acid 1 mg daily, B12 1000 mcg daily.  4. Proceed this week with transfusion 2 units PRBCs for symptomatic anemia.  Patient otherwise traditionally is transfuse less than 7.0.  5. Continue home dialysis device 5 days/week x 2 hours.  6. Patient is receiving Mircera under the direction of nephrology, dosing every 4 weeks.  7. Patient is continuing follow-up with cardiology and cardiothoracic surgery regarding management of severe aortic stenosis.  Await recommendations regarding possible TAVR versus initial balloon valvuloplasty.  Patient scheduled to be seen by cardiology in January 2023.  8. In 2 weeks CBC and RN review  9. In 3 weeks PET scan  10. In 4 weeks MD visit with CBC, CMP and Faslodex pending PET scan results.    Patient continues on high risk medication requiring intensive monitoring.

## 2025-05-15 NOTE — PROGRESS NOTES
Subjective .     REASONS FOR FOLLOWUP:    1. Metastatic lobular breast cancer (ER/NE positive, HER-2/tj negative):  · History of stage IIIC right breast cancer ([id number])  · Patient treated previously in the [facility]  · Diagnosis via excisional biopsy 8/23/2003 with lobular carcinoma, 4.5 cm, positive margins.  ER/NE positive, HER-2/tj negative.  · Staging evaluation in September 2003 with negative bone scan and negative CT scans.  Ejection fraction 65%.  · Received neoadjuvant chemotherapy (? GET regimen) which apparently included Taxotere and possibly epirubicin.  Received 6 cycles.  · 1/22/2004 bilateral mastectomy with right axillary dissection.  Per available records, 10-12 cm residual invasive lobular carcinoma in the breast with 14/16 lymph nodes positive with extranodal extension.  Immediate reconstruction.  · Received adjuvant chemotherapy with carboplatin and navelbine x4 cycles  · Initiated adjuvant tamoxifen 6/22/2004  · Adjuvant radiation therapy initiated but interrupted due to chest wall cellulitis requiring removal of implants in August 2004  · Implant reconstruction again attempted with MRSA infection, implant removed 12/30/2005 with no further attempts made and reconstruction.  · Completed 5 years tamoxifen in June 2009 and subsequently transitioned to Femara.  Received Femara until June 2014.  · Patient experienced postmenopausal vaginal bleeding in 2013, negative endometrial biopsy and gynecologic evaluation.  · Patient last seen in [facility] 6/18/2014  · CT chest performed to evaluate bicuspid aortic valve and dilated asending aorta 7/12/2019.  CT showed no change in aorta however showed new free intraperitoneal fluid in the upper abdomen with mesenteric edema, concerning for carcinomatosis.  · CT abdomen and pelvis (without IV contrast) on 7/16/2019 with mesenteric thickening, small volume of complex fluid in the mesentery which was suspicious and possibly representative of  carcinomatosis, small amount of perihepatic fluid, small bowel loops tethered to omentum without obstruction, omental thickening, shotty retroperitoneal and pelvic lymph nodes (non-pathologically enlarged).  · PET scan 7/26/2019 with hypermetabolic omental activity, hypermetabolic small retroperitoneal lymph nodes, hypermetabolic activity throughout uterus with increase in size.  · CT-guided omental biopsy 7/30/2019 with metastatic lobular carcinoma of breast primary, ER positive (greater than 95%), IA positive (90%), HER-2/tj negative (1+ IHC)  · Baseline CA 15-3 on 7/22/19 was 32.6  · Initiation of Aromasin 25 mg daily 8/12/2019.  Initiated Ibrance on 8/26/2019 at 100 mg 21/28 days on 8/26/2019.    · Response on CT scans 10/16/2019 following 2 cycles of Ibrance/Aromasin.  · Stable findings on subsequent CT chest abdomen pelvis 12/11/2019.  Further improvement in CA 15-3-23.9 on 12/18/2019.  Ibrance/Aromasin continued.    · Foundation medicine liquid biopsy 2/5/2020: MSI undetermined, mutations in BETH, NF1, CHEK2.  No evidence of PIK3CA mutation.  2. Anemia:  · Normocytic anemia, hemoglobin in 10-11 range  · Anemia evaluation 7/22/2019 with iron 30, ferritin 85.2, iron saturation 10%, TIBC 311, B12 370, erythropoietin level 20.4  · Anemia felt in large part to be related to CKD3 as well as to underlying malignancy  · Evidence of folate deficiency 8/12/2019 with level 3.84, initiated folic acid 1 mg daily  · If hemoglobin consistently below 10 consider use of Procrit  · Decline in hemoglobin into the 8 range, iron studies 7/20/2020 with iron 54, ferritin 66.7, iron saturation 16%, TIBC 328.  On 8/7/2020 proceeded with Injectafer 750 mg IV x2 doses.  Following IV iron if hemoglobin remains less than 10 we will consider use of Procrit.      HISTORY OF PRESENT ILLNESS:  The patient is a 59 y.o. year old female who is here for follow-up with the above-mentioned history.    History of Present Illness   patient returns  today in follow-up continuing on Aromasin 25 mg daily and Ibrance 100 mg daily for 21/28 days.  She reports that her last dose of Ibrance for this cycle will be tomorrow.  She will then have 1 week off treatment.  She notes some minimal nausea and diarrhea on treatment with no change.  She does note an increase in fatigue and dyspnea on exertion over the past 3 to 4 weeks.  She has no other complaints, denies any abdominal pain.    Past Medical History:   Diagnosis Date   • Anemia    • Anxiety and depression    • Bicuspid aortic valve    • Breast cancer (CMS/HCC)     RIGHT, HAD NEELA. MASTECTOMY, CHEMO AND RADIATION   • CKD (chronic kidney disease)    • Diabetes mellitus (CMS/HCC)    • Frequent UTI    • Heart murmur    • History of kidney stones    • History of MRSA infection     POST BREAST SURGERY-TREATED [facility]   • History of sepsis     KIDNEY STONE   • Hyperlipidemia    • Hypertension    • Hyperthyroidism    • Hypothyroidism    • Lymphedema of leg    • Metastasis from breast cancer (CMS/HCC)     STOMACH-OMENTUM   • Neuromuscular disorder (CMS/HCC)    • Obesity    • ANDREW on CPAP    • Osteoarthrosis    • Radiculopathy    • Thickened endometrium    • Tremor      Past Surgical History:   Procedure Laterality Date   • BREAST RECONSTRUCTION      WITH IMPLANTS, AND REVISION, NOW REMOVED   • BREAST SURGERY     •  SECTION  1987   •  SECTION  1987   • D&C HYSTEROSCOPY N/A 2017    Procedure: DILATATION AND CURETTAGE, HYSTEROSCOPY ;  Surgeon: Cherie Oliveros MD;  [place] [facility] [place] [facility] [place]:    • D&C HYSTEROSCOPY N/A 2019    Procedure: DILATATION AND CURETTAGE HYSTEROSCOPY/POLYPECTOMY;  Surgeon: Cherie Oliveros MD;  [place] [facility] [place] [facility] [place]  Service: Gynecology   • LYMPH NODE BIOPSY     • MASTECTOMY Bilateral 2003   • MASTECTOMY Bilateral          ONCOLOGIC HISTORY:  (History from previous dates can be found in the separate document.)    Current  Outpatient Medications on File Prior to Visit   Medication Sig Dispense Refill   • amLODIPine (NORVASC) 10 MG tablet Take 10 mg by mouth Every Morning.     • aspirin 81 MG EC tablet Take 81 mg by mouth Daily. HELD FOR SURGERY     • atorvastatin (LIPITOR) 40 MG tablet Take 1 tablet by mouth Daily. 90 tablet 1   • carvedilol (COREG) 3.125 MG tablet TAKE ONE TABLET BY MOUTH TWICE A DAY 60 tablet 2   • Dulaglutide 1.5 MG/0.5ML solution pen-injector Inject 1.5 mg under the skin into the appropriate area as directed 1 (One) Time Per Week. MONDAYS 12 pen 1   • DULoxetine (CYMBALTA) 60 MG capsule      • exemestane (AROMASIN) 25 MG chemo tablet TAKE ONE TABLET BY MOUTH DAILY 30 tablet 3   • folic acid (FOLVITE) 1 MG tablet TAKE ONE TABLET BY MOUTH DAILY 90 tablet 0   • gabapentin (NEURONTIN) 300 MG capsule Take 2 capsules by mouth 3 (Three) Times a Day. 540 capsule 1   • LEVEMIR FLEXTOUCH 100 UNIT/ML injection Inject 50 Units under the skin into the appropriate area as directed 2 (Two) Times a Day. 3 pen 5   • levothyroxine (SYNTHROID, LEVOTHROID) 50 MCG tablet Take 1 tablet by mouth Daily. 90 tablet 1   • ondansetron (Zofran) 8 MG tablet Take 1 tablet by mouth Every 8 (Eight) Hours As Needed for Nausea or Vomiting. 30 tablet 2   • Palbociclib (Ibrance) 100 MG capsule capsule TAKE 1 CAPSULE BY MOUTH DAILY FOR 21 DAYS ON THEN 7 DAYS OFF 21 capsule 5   • repaglinide (PRANDIN) 2 MG tablet Take 1 tablet by mouth 3 (Three) Times a Day Before Meals. 270 tablet 1   • vitamin D (ERGOCALCIFEROL) 30640 units capsule capsule 50,000 Units Every 7 (Seven) Days.       No current facility-administered medications on file prior to visit.        ALLERGIES:   No Known Allergies    Social History     Socioeconomic History   • Marital status:      Spouse name: Not on file   • Number of children: Not on file   • Years of education: Not on file   • Highest education level: Not on file   Tobacco Use   • Smoking status: Never Smoker   •  Smokeless tobacco: Never Used   Substance and Sexual Activity   • Alcohol use: No   • Drug use: No   • Sexual activity: Yes     Partners: Male     Birth control/protection: Post-menopausal     Family History   Problem Relation Age of Onset   • Coronary artery disease Mother         Mother  from MI at 72   • Diabetes Mother    • Breast cancer Mother    • Hyperlipidemia Mother    • Arthritis Mother    • Cancer Mother    • Aortic aneurysm Father         Father with ruptured thoracic aortic aneurysm   • Coronary artery disease Father         Father  from MI at 74   • Heart disease Father    • Hyperlipidemia Father    • Arthritis Father    • Coronary artery disease Maternal Grandmother         MGM deceeased from MI at age 76   • Malig Hyperthermia Neg Hx               Review of Systems   Constitutional: Positive for fatigue. Negative for activity change, appetite change, fever and unexpected weight change.   HENT: Negative for congestion, mouth sores, nosebleeds, sore throat and voice change.    Respiratory: Positive for shortness of breath. Negative for cough and wheezing.    Cardiovascular: Negative for chest pain, palpitations and leg swelling.   Gastrointestinal: Positive for diarrhea and nausea. Negative for abdominal distention, abdominal pain, blood in stool, constipation and vomiting.   Endocrine: Negative for cold intolerance and heat intolerance.   Genitourinary: Negative for difficulty urinating, dysuria, frequency and hematuria.   Musculoskeletal: Negative for arthralgias, back pain, joint swelling and myalgias.   Skin: Negative for rash.   Neurological: Positive for numbness. Negative for dizziness, syncope, weakness, light-headedness and headaches.   Hematological: Negative for adenopathy. Does not bruise/bleed easily.   Psychiatric/Behavioral: Negative for confusion and sleep disturbance. The patient is not nervous/anxious.          Objective      Vitals:    20 1434   BP: (!) 190/80    Pulse: 77   Resp: 22   Temp: 97.3 °F (36.3 °C)   SpO2: 95%      Current Status 7/10/2020   ECOG score 1   Pain 0/10    Physical Exam   Constitutional: She is oriented to person, place, and time. She appears well-developed and well-nourished.   Patient does ambulate with the use of a rolling walker.   HENT:   Mouth/Throat: Oropharynx is clear and moist.   Eyes: Conjunctivae are normal.   Neck: No thyromegaly present.   Cardiovascular: Normal rate and regular rhythm. Exam reveals no gallop and no friction rub.   Murmur heard.  Pulmonary/Chest: Breath sounds normal. No respiratory distress.   Abdominal: Soft. Bowel sounds are normal. She exhibits no distension. There is no tenderness.   Musculoskeletal: She exhibits edema.   Trace to 1+ chronic bilateral lower extremity edema with venous stasis changes   Lymphadenopathy:        Head (right side): No submandibular adenopathy present.     She has no cervical adenopathy.     She has no axillary adenopathy.        Right: No inguinal and no supraclavicular adenopathy present.        Left: No inguinal and no supraclavicular adenopathy present.   Neurological: She is alert and oriented to person, place, and time. She displays normal reflexes. No cranial nerve deficit. She exhibits normal muscle tone.   Skin: Skin is warm and dry. No rash noted.   Psychiatric: She has a normal mood and affect. Her behavior is normal.   The patient was examined today, unchanged from above    RECENT LABS:  Hematology WBC   Date Value Ref Range Status   08/07/2020 3.07 (L) 3.40 - 10.80 10*3/mm3 Final   03/16/2019 7.6 3.4 - 10.8 x10E3/uL Final     RBC   Date Value Ref Range Status   08/07/2020 2.58 (L) 3.77 - 5.28 10*6/mm3 Final   03/16/2019 3.80 3.77 - 5.28 x10E6/uL Final     Hemoglobin   Date Value Ref Range Status   08/07/2020 8.4 (L) 12.0 - 15.9 g/dL Final     Hematocrit   Date Value Ref Range Status   08/07/2020 27.5 (L) 34.0 - 46.6 % Final     Platelets   Date Value Ref Range Status    08/07/2020 252 140 - 450 10*3/mm3 Final        Lab Results   Component Value Date    GLUCOSE 271 (H) 08/07/2020    BUN 39 (H) 08/07/2020    CREATININE 2.72 (C) 08/07/2020    EGFRIFNONA 18 (L) 08/07/2020    EGFRIFAFRI 35 (L) 03/16/2019    BCR 14.3 08/07/2020    K 5.3 (H) 08/07/2020    CO2 23.6 08/07/2020    CALCIUM 8.8 08/07/2020    PROTENTOTREF 7.0 03/16/2019    ALBUMIN 3.60 08/07/2020    LABIL2 1.2 03/16/2019    AST 13 08/07/2020    ALT 13 08/07/2020     Reviewed CT scans from 7/31/2020 as outlined below.  I did personally review CT images.    Assessment/Plan    1. Metastatic lobular breast cancer (ER/WV positive, HER-2/tj negative):  · History of stage IIIC right breast cancer ([id number])  · Patient treated previously in the [facility]  · Diagnosis via excisional biopsy 8/23/2003 with lobular carcinoma, 4.5 cm, positive margins.  ER/WV positive, HER-2/tj negative.  · Staging evaluation in September 2003 with negative bone scan and negative CT scans.  Ejection fraction 65%.  · Received neoadjuvant chemotherapy (? GET regimen) which apparently included Taxotere and possibly epirubicin.  Received 6 cycles.  · 1/22/2004 bilateral mastectomy with right axillary dissection.  Per available records, 10-12 cm residual invasive lobular carcinoma in the breast with 14/16 lymph nodes positive with extranodal extension.  Immediate reconstruction.  · Received adjuvant chemotherapy with carboplatin and navelbine x4 cycles  · Initiated adjuvant tamoxifen 6/22/2004  · Adjuvant radiation therapy initiated but interrupted due to chest wall cellulitis requiring removal of implants in August 2004  · Implant reconstruction again attempted with MRSA infection, implant removed 12/30/2005 with no further attempts made and reconstruction.  · Completed 5 years tamoxifen in June 2009 and subsequently transitioned to Femara.  Received Femara until June 2014.  · Patient experienced postmenopausal vaginal bleeding in 2013, negative  endometrial biopsy and gynecologic evaluation.  · Patient last seen in [facility] 6/18/2014  · CT chest performed to evaluate bicuspid aortic valve and dilated asending aorta 7/12/2019.  CT showed no change in aorta however showed new free intraperitoneal fluid in the upper abdomen with mesenteric edema, concerning for carcinomatosis.  · CT abdomen and pelvis (without IV contrast) on 7/16/2019 with mesenteric thickening, small volume of complex fluid in the mesentery which was suspicious and possibly representative of carcinomatosis, small amount of perihepatic fluid, small bowel loops tethered to omentum without obstruction, omental thickening, shotty retroperitoneal and pelvic lymph nodes (non-pathologically enlarged).  · PET scan 7/26/2019 with hypermetabolic omental activity, hypermetabolic small retroperitoneal lymph nodes, hypermetabolic activity throughout uterus with increase in size.  · CT-guided omental biopsy 7/30/2019 with metastatic lobular carcinoma of breast primary, ER positive (greater than 95%), OH positive (90%), HER-2/tj negative (1+ IHC)  · Baseline CA 15-3 on 7/22/19 was 32.6  · Initiation of Aromasin 25 mg daily 8/12/2019.  Initiated Ibrance at 100 mg 21/28 days on 8/26/2019.  · Improvement in CA 15-3 on 9/17/19-26.3  · Following 2 cycles of Aromasin and Ibrance, CA 15-3 declined to 25.9 on 10/15/2019.  CT scan chest abdomen pelvis 10/16/2019 showed improvement in the mesenteric and omental thickening and near resolution of complex ascites.  Treatment continued.  · Stable findings on subsequent CT chest abdomen pelvis 12/11/2019.  Further improvement in CA 15-3-23.9 on 12/18/2019.  Ibrance/Aromasin continued.   · Foundation medicine liquid biopsy 2/5/2020: MSI undetermined, mutations in BETH, NF1, CHEK2.  No evidence of PIK3CA mutation.  · Slight increase in CA 15-3 to 27.1 on 2/19/2020 and 29.1 on 3/18/2020.  CT however on 3/13/2020 with no new findings.  · Subsequent improvement in CA  15-3-26.5 on 5/15/2020  · Patient returns today in follow-up continuing on Aromasin 25 mg daily and Ibrance 100 mg daily for 21/28 days (due to receive her last dose of Ibrance for this cycle tomorrow).  Tolerated treatment reasonably well with some minimal nausea and diarrhea particularly in the beginning of her cycle.  We did review 2-month interval follow-up CT scans chest and pelvis from 7/31/2020.  Fortunately this shows evidence of stable disease.  We did discuss the limitations of noncontrasted CT imaging and assessing her status.  We also reviewed her CA 15-3 which has declined down to 23.1 in the last value of 7/24/2020.  Continue on with treatment as planned with next cycle Ibrance when due.  I will plan to see her back in 4 weeks.  See below regarding management of anemia.  2. Normocytic anemia in the setting of CKD3:  · The patient appears to have a chronic anemia with hemoglobin in the 10-11 range with normal MCV.  · Patient has underlying CKD3 with baseline creatinine recently in the 1.5-1.8 range.  · Anemia evaluation 7/22/2019 with iron 30, ferritin 85.2, iron saturation 10%, TIBC 311, B12 370, erythropoietin level 20.4  · Anemia felt in large part to be related to CKD3 as well as to underlying malignancy  · Evidence of folate deficiency 8/12/2019 with level 3.84, initiated folic acid 1 mg daily  · Additional labs on 12/18/2019 with iron 73, ferritin 82.2, iron saturation 25%, TIBC 290.  We did discuss the potential use of Procrit if her hemoglobin declines into the low 9/upper 8 range.  · Hemoglobin today is declined down to 8.4.  We did review her recent iron studies from 7/20/2020 indicating iron 54, ferritin 66.7, iron saturation 16%, TIBC 328.  The patient has iron studies too low to begin Procrit.  Therefore we discussed proceeding with Injectafer 750 mg IV today and again in 1 week.  In 2 weeks we will repeat her iron studies with her other labs and I will see her in 4 weeks.  We will discuss  at that time pending her hemoglobin whether to proceed with any Procrit.  3. CKD3:  · Baseline creatinine recently in 1.6-2.0 range  · On 9/10/2019, RYAN/CKD3 with creatinine up to 2.44, BUN of 40.  Received 1 L normal saline.  Patient with increase in oral fluid intake.  · Renal ultrasound 9/20/2019 with no evidence of obstructive uropathy.  · Diminished oral intake due to nausea from Ibrance, creatinine 2.79 with BUN 43 on 10/15/2019.  Received 1 L normal saline.  Repeat labs on 10/18/2019 with BUN 31, creatinine 2.0.  · During cycle 3 Ibrance, patient developed increase in creatinine on 11/6/2019 to 2.35 and received 1 L normal saline.  · Patient is trying to maintain adequate oral hydration.    · It appears at this point that her new baseline creatinine is in the 2-2.4 range.    · The patient has creatinine that continues to gradually escalate over time.  Current creatinine is 2.72.  We have discussed on multiple occasions trying to maintain adequate oral intake and patient reports that she has been very conscious of this recently.  Continue to monitor renal function in 2 and 4 weeks.  4. CHF with mild to moderate aortic stenosis:  · Recent cardiology evaluation with echocardiogram 7/12/2019 showing ejection fraction 48% with moderate dilation of the LV cavity, global hypokinesis, grade 2 diastolic dysfunction, mild to moderate aortic stenosis, trivial pericardial effusion.  · CT chest 7/12/2019 with no change in the aorta compared to prior study 12/13/2017.  5. Left upper and bilateral lower extremity peripheral neuropathy:  · Patient reports that left upper extremity neuropathy was not present from previous chemotherapy but occurred after hospitalization that required intubation and critical care stay.  Apparently felt to represent critical care neuropathy.  Improved over time.  · Bilateral lower extremity neuropathy felt to be related to diabetes  · May have future implications regarding treatment for breast  cancer.  · The patient is currently continuing on gabapentin 600 mg 3 times daily.  6. Uterine/endometrial activity on PET scan:  · Patient experienced postmenopausal vaginal bleeding in 2013, negative endometrial biopsy and gynecologic evaluation.  · With findings on PET scan with enlarging uterus and some hypermetabolic activity, referred back to Dr. Oliveros and gynecology.    · 9/19/2019 D&C with hysteroscopy by Dr. Oliveros, no significant intraoperative findings, pathologic results with benign findings, no evidence of malignancy.  7. Nausea:  · Mild, relieved with Zofran.    8. Myelosuppression secondary to Ibrance:  · With cycle 1, jessica WBC 2.49 with ANC 1.25 and platelet count 140,000.  · With cycle 2, jessica WBC 2.33 with ANC 1.06, maintained normal platelet count  · Today, WBC 3.07 with ANC 2.09 and platelet count 252,000.     Plan:  1. Continue Aromasin 25 mg daily.    2. Continue Ibrance 100 mg daily for 21/28 days (due for her last dose Ibrance this cycle tomorrow)  3. Continue oral folic acid 1 mg daily  4. The patient will continue with increased oral fluid intake at home  5. Proceed with Injectafer 750 mg IV today  6. In 1 week return for second dose Injectafer 750 mg IV  7. In 2 weeks CBC, CMP, iron panel, ferritin and RN review  8. In 4 weeks MD visit with CBC, CMP.  We will discuss potential use of Procrit pending her hemoglobin at that time.    Patient continues on high risk medication requiring intensive monitoring.                             Anticipated Starting Dosage (Optional): 20mg BID Detail Level: Zone Patient Reported Weight (Optional - Include Units): 125 Initial Triglycerides (Optional): 93 Initial Cholesterol (Optional): 150

## (undated) PROCEDURE — 03HB33Z INSERTION OF INFUSION DEVICE INTO RIGHT RADIAL ARTERY, PERCUTANEOUS APPROACH: ICD-10-PCS | Performed by: HOSPITALIST

## (undated) DEVICE — LOU D & C HYSTEROSCOPY: Brand: MEDLINE INDUSTRIES, INC.

## (undated) DEVICE — INTENDED FOR TISSUE SEPARATION, AND OTHER PROCEDURES THAT REQUIRE A SHARP SURGICAL BLADE TO PUNCTURE OR CUT.: Brand: BARD-PARKER ® CARBON RIB-BACK BLADES

## (undated) DEVICE — LOU MINOR PROCEDURE: Brand: MEDLINE INDUSTRIES, INC.

## (undated) DEVICE — GW XCHG AMPLTZ XSTIF PTFE CRV .035 3X260

## (undated) DEVICE — GLV SURG BIOGEL LTX PF 7

## (undated) DEVICE — GLV SURG BIOGEL LTX PF 6 1/2

## (undated) DEVICE — SYR LUERLOK 20CC BX/50

## (undated) DEVICE — INTRO SHEATH ART/FEM ENGAGE .035 5F12CM

## (undated) DEVICE — CATH DIAG IMPULSE PIG 5F 100CM

## (undated) DEVICE — PENCL E/S ULTRAVAC TELESCP NOSE HOLSTR 10FT

## (undated) DEVICE — TR BAND RADIAL ARTERY COMPRESSION DEVICE: Brand: TR BAND

## (undated) DEVICE — GLV SURG BIOGEL LTX PF 8 1/2

## (undated) DEVICE — CATH URETRL FLXITP POLLACK STD 5F 70CM

## (undated) DEVICE — SYR LUERLOK 5CC

## (undated) DEVICE — BIOPATCH™ ANTIMICROBIAL DRESSING WITH CHLORHEXIDINE GLUCONATE IS A HYDROPHILLIC POLYURETHANE ABSORPTIVE FOAM WITH CHLORHEXIDINE GLUCONATE (CHG) WHICH INHIBITS BACTERIAL GROWTH UNDER THE DRESSING. THE DRESSING IS INTENDED TO BE USED TO ABSORB EXUDATE, COVER A WOUND CAUSED BY VASCULAR AND NONVASCULAR PERCUTANEOUS MEDICAL DEVICES DURING SURGERY, AS WELL AS REDUCE LOCAL INFECTION AND COLONIZATION OF MICROORGANISMS.: Brand: BIOPATCH

## (undated) DEVICE — CATH ELECTRD PACE TEMP BI NONHEP 5F110CM

## (undated) DEVICE — CANSTR SPECI FLUID COL BEMIS

## (undated) DEVICE — PK AV FISTL/SHNT 40

## (undated) DEVICE — LOU CYSTO: Brand: MEDLINE INDUSTRIES, INC.

## (undated) DEVICE — PK CATH CARD 40

## (undated) DEVICE — SEAL HYSTERSCOPE/OUTFLOW CHANNEL MYOSURE

## (undated) DEVICE — 1000ML,PRESSURE INFUSER W/STOPCOCK: Brand: MEDLINE

## (undated) DEVICE — TRAP FLD MINIVAC MEGADYNE 100ML

## (undated) DEVICE — BALN DIL TYSHAK/X 20MM 5X102

## (undated) DEVICE — Device

## (undated) DEVICE — ST IRR CYSTO W/SPK 77IN LF

## (undated) DEVICE — TUBING, SUCTION, 1/4" X 20', STRAIGHT: Brand: MEDLINE INDUSTRIES, INC.

## (undated) DEVICE — KT MANIFLD CARDIAC

## (undated) DEVICE — HI-TORQUE SUPRA CORE .035 PERIPHERAL GUIDE WIRE .035 X 190 CM: Brand: HI-TORQUE SUPRA CORE

## (undated) DEVICE — CVR PROB 96IN LF STRL

## (undated) DEVICE — INTRO SHEATH ART/FEM ENGAGE .035 6F12CM

## (undated) DEVICE — SUT SILK 3/0 SH CR5 18IN C0135

## (undated) DEVICE — PAD SANI MAXI W/ADHS SNG WRP 11IN

## (undated) DEVICE — TIDISHIELD UROLOGY DRAIN BAGS FROSTY VINYL STERILE FITS SIEMENS UROSKOP ACCESS 20 PER CASE: Brand: TIDISHIELD

## (undated) DEVICE — SUT SILK 2/0 TIES 18IN A185H

## (undated) DEVICE — CATH DIAG IMPULSE AL1 6F 100CM

## (undated) DEVICE — PATIENT RETURN ELECTRODE, SINGLE-USE, CONTACT QUALITY MONITORING, ADULT, WITH 9FT CORD, FOR PATIENTS WEIGING OVER 33LBS. (15KG): Brand: MEGADYNE

## (undated) DEVICE — ST. SORBAVIEW ULTIMATE IJ SYSTEM A,C: Brand: CENTURION

## (undated) DEVICE — GW INQWIRE FC PTFE STR .035IN 150

## (undated) DEVICE — NITINOL WIRE WITH HYDROPHILIC TIP: Brand: SENSOR

## (undated) DEVICE — SUT SILK 3/0 TIES 18IN A184H

## (undated) DEVICE — ANGIO-SEAL EVOLUTION VASCULAR CLOSURE DEVICE: Brand: ANGIO-SEAL

## (undated) DEVICE — BALN PRESS WEDGE 5F 110CM

## (undated) DEVICE — OSC HYSTEROSCOPY: Brand: MEDLINE INDUSTRIES, INC.

## (undated) DEVICE — CATH DIAG IMPULSE FR5 5F 100CM

## (undated) DEVICE — DECANT BG O JET

## (undated) DEVICE — GOWN,NON-REINFORCED,SIRUS,SET IN SLV,XL: Brand: MEDLINE

## (undated) DEVICE — GLV SURG SENSICARE PI MIC PF SZ6.5 LF STRL

## (undated) DEVICE — PERCLOSE™ PROSTYLE™ SUTURE-MEDIATED CLOSURE AND REPAIR SYSTEM: Brand: PERCLOSE™ PROSTYLE™

## (undated) DEVICE — NDL HYPO PRECISIONGLIDE REG 25G 1 1/2

## (undated) DEVICE — SYR LL TP 10ML STRL

## (undated) DEVICE — 3M™ IOBAN™ 2 ANTIMICROBIAL INCISE DRAPE 6650EZ: Brand: IOBAN™ 2

## (undated) DEVICE — KT CATH TAL PALINDROME SAPPHIRE 14.5F23CM

## (undated) DEVICE — NDL SPINE 22G 31/2IN BLK

## (undated) DEVICE — ANTIBACTERIAL UNDYED BRAIDED (POLYGLACTIN 910), SYNTHETIC ABSORBABLE SUTURE: Brand: COATED VICRYL

## (undated) DEVICE — PROB ABL ENDOMTRL NOVASURE/G4 W/SURESND

## (undated) DEVICE — SUT PROLN 6/0 BV1 D/A 30IN 8709H

## (undated) DEVICE — SYRINGE,TOOMEY,IRRIGATION,70CC,STERILE: Brand: MEDLINE

## (undated) DEVICE — SUT ETHLN 2/0 PS 18IN 585H

## (undated) DEVICE — UNDERGLV SURG BIOGEL INDICAT PI SZ8.5 BLU

## (undated) DEVICE — BALN DIL TYSHAK/X 22MM 5X102

## (undated) DEVICE — SOL NACL 0.9PCT 1000ML

## (undated) DEVICE — ADHS SKIN SURG TISS VISC PREMIERPRO EXOFIN HI/VISC FAST/DRY

## (undated) DEVICE — GW EMR FIX EXCHG J STD .035 3MM 260CM

## (undated) DEVICE — STRAP STIRUP WO/ RNG

## (undated) DEVICE — GOWN,REINF,POLY,SIRUS,BRTH SLV,XLNG/XXL: Brand: MEDLINE

## (undated) DEVICE — SOL NS 500ML

## (undated) DEVICE — SYR SLP TP 10ML DISP

## (undated) DEVICE — GLIDESHEATH SLENDER STAINLESS STEEL KIT: Brand: GLIDESHEATH SLENDER

## (undated) DEVICE — CATH DIAG IMPULSE PIG145 6F 110CM

## (undated) DEVICE — CATH COUVALAIRE SIMPLASTIC 3WY 22F 30CC

## (undated) DEVICE — PINNACLE INTRODUCER SHEATH: Brand: PINNACLE

## (undated) DEVICE — Device: Brand: OLYMPUS

## (undated) DEVICE — CATH DIAG IMPULSE FL3.5 5F 100CM

## (undated) DEVICE — BANDAGE,GAUZE,BULKEE II,4.5"X4.1YD,STRL: Brand: MEDLINE